# Patient Record
Sex: MALE | Race: WHITE | Employment: OTHER | URBAN - NONMETROPOLITAN AREA
[De-identification: names, ages, dates, MRNs, and addresses within clinical notes are randomized per-mention and may not be internally consistent; named-entity substitution may affect disease eponyms.]

---

## 2017-01-03 DIAGNOSIS — G56.02 CARPAL TUNNEL SYNDROME OF LEFT WRIST: ICD-10-CM

## 2017-01-03 RX ORDER — OXYCODONE HYDROCHLORIDE AND ACETAMINOPHEN 5; 325 MG/1; MG/1
TABLET ORAL
Qty: 120 TABLET | Refills: 0 | OUTPATIENT
Start: 2017-01-03

## 2017-01-04 DIAGNOSIS — G56.02 CARPAL TUNNEL SYNDROME OF LEFT WRIST: ICD-10-CM

## 2017-01-04 RX ORDER — OXYCODONE HYDROCHLORIDE AND ACETAMINOPHEN 5; 325 MG/1; MG/1
TABLET ORAL
Qty: 120 TABLET | Refills: 0 | OUTPATIENT
Start: 2017-01-04

## 2017-01-18 ENCOUNTER — TELEPHONE (OUTPATIENT)
Dept: FAMILY MEDICINE CLINIC | Age: 62
End: 2017-01-18

## 2017-01-20 DIAGNOSIS — J43.9 PULMONARY EMPHYSEMA, UNSPECIFIED EMPHYSEMA TYPE (HCC): Primary | ICD-10-CM

## 2017-01-24 ENCOUNTER — TELEPHONE (OUTPATIENT)
Dept: FAMILY MEDICINE CLINIC | Age: 62
End: 2017-01-24

## 2017-02-07 RX ORDER — SPIRONOLACTONE 50 MG/1
TABLET, FILM COATED ORAL
Qty: 30 TABLET | Refills: 11 | Status: SHIPPED | OUTPATIENT
Start: 2017-02-07 | End: 2018-01-09 | Stop reason: SDUPTHER

## 2017-02-07 RX ORDER — EZETIMIBE 10 MG/1
TABLET ORAL
Qty: 30 TABLET | Refills: 11 | Status: SHIPPED | OUTPATIENT
Start: 2017-02-07 | End: 2017-08-29 | Stop reason: ALTCHOICE

## 2017-03-03 ENCOUNTER — OFFICE VISIT (OUTPATIENT)
Dept: FAMILY MEDICINE CLINIC | Age: 62
End: 2017-03-03

## 2017-03-03 VITALS
OXYGEN SATURATION: 97 % | DIASTOLIC BLOOD PRESSURE: 76 MMHG | SYSTOLIC BLOOD PRESSURE: 126 MMHG | HEART RATE: 82 BPM | BODY MASS INDEX: 36.1 KG/M2 | HEIGHT: 67 IN | WEIGHT: 230 LBS

## 2017-03-03 DIAGNOSIS — F10.10 ALCOHOL ABUSE: ICD-10-CM

## 2017-03-03 DIAGNOSIS — G56.02 CARPAL TUNNEL SYNDROME OF LEFT WRIST: ICD-10-CM

## 2017-03-03 DIAGNOSIS — G89.4 CHRONIC PAIN SYNDROME: ICD-10-CM

## 2017-03-03 DIAGNOSIS — C67.9 MALIGNANT NEOPLASM OF URINARY BLADDER, UNSPECIFIED SITE (HCC): ICD-10-CM

## 2017-03-03 DIAGNOSIS — K70.10 CHRONIC ALCOHOLIC HEPATITIS: ICD-10-CM

## 2017-03-03 DIAGNOSIS — I10 BENIGN ESSENTIAL HTN: Primary | ICD-10-CM

## 2017-03-03 DIAGNOSIS — J41.8 MIXED SIMPLE AND MUCOPURULENT CHRONIC BRONCHITIS (HCC): ICD-10-CM

## 2017-03-03 DIAGNOSIS — Z72.0 TOBACCO ABUSE: ICD-10-CM

## 2017-03-03 DIAGNOSIS — G56.22 ULNAR NERVE ENTRAPMENT, LEFT: ICD-10-CM

## 2017-03-03 DIAGNOSIS — E78.2 MIXED HYPERLIPIDEMIA: ICD-10-CM

## 2017-03-03 DIAGNOSIS — N32.0 BLADDER OUTLET OBSTRUCTION: ICD-10-CM

## 2017-03-03 DIAGNOSIS — E11.9 TYPE 2 DIABETES MELLITUS WITHOUT COMPLICATION, WITHOUT LONG-TERM CURRENT USE OF INSULIN (HCC): ICD-10-CM

## 2017-03-03 PROCEDURE — 99214 OFFICE O/P EST MOD 30 MIN: CPT | Performed by: FAMILY MEDICINE

## 2017-03-03 RX ORDER — FINASTERIDE 5 MG/1
TABLET, FILM COATED ORAL
COMMUNITY
Start: 2017-02-07 | End: 2019-07-09

## 2017-03-03 ASSESSMENT — PATIENT HEALTH QUESTIONNAIRE - PHQ9
1. LITTLE INTEREST OR PLEASURE IN DOING THINGS: 0
SUM OF ALL RESPONSES TO PHQ QUESTIONS 1-9: 0
2. FEELING DOWN, DEPRESSED OR HOPELESS: 0
SUM OF ALL RESPONSES TO PHQ9 QUESTIONS 1 & 2: 0

## 2017-03-04 RX ORDER — GABAPENTIN 300 MG/1
CAPSULE ORAL
Qty: 180 CAPSULE | Refills: 11 | Status: SHIPPED | OUTPATIENT
Start: 2017-03-04 | End: 2018-02-07 | Stop reason: SDUPTHER

## 2017-03-05 PROBLEM — E78.2 MIXED HYPERLIPIDEMIA: Status: ACTIVE | Noted: 2017-03-05

## 2017-03-05 PROBLEM — N32.0 BLADDER OUTLET OBSTRUCTION: Status: ACTIVE | Noted: 2017-03-05

## 2017-03-09 ENCOUNTER — TELEPHONE (OUTPATIENT)
Dept: FAMILY MEDICINE CLINIC | Age: 62
End: 2017-03-09

## 2017-03-09 ENCOUNTER — NURSE ONLY (OUTPATIENT)
Dept: FAMILY MEDICINE CLINIC | Age: 62
End: 2017-03-09

## 2017-03-09 DIAGNOSIS — E11.9 TYPE 2 DIABETES MELLITUS WITHOUT COMPLICATION, WITHOUT LONG-TERM CURRENT USE OF INSULIN (HCC): ICD-10-CM

## 2017-03-09 DIAGNOSIS — E78.2 MIXED HYPERLIPIDEMIA: ICD-10-CM

## 2017-03-09 DIAGNOSIS — K70.10 CHRONIC ALCOHOLIC HEPATITIS: ICD-10-CM

## 2017-03-09 DIAGNOSIS — I10 BENIGN ESSENTIAL HTN: ICD-10-CM

## 2017-03-09 LAB
A/G RATIO: 1 (ref 1.1–2.2)
ALBUMIN SERPL-MCNC: 4.3 G/DL (ref 3.4–5)
ALP BLD-CCNC: 197 U/L (ref 40–129)
ALT SERPL-CCNC: 54 U/L (ref 10–40)
ANION GAP SERPL CALCULATED.3IONS-SCNC: 19 MMOL/L (ref 3–16)
AST SERPL-CCNC: 57 U/L (ref 15–37)
BILIRUB SERPL-MCNC: 0.3 MG/DL (ref 0–1)
BUN BLDV-MCNC: 6 MG/DL (ref 7–20)
CALCIUM SERPL-MCNC: 9.7 MG/DL (ref 8.3–10.6)
CHLORIDE BLD-SCNC: 96 MMOL/L (ref 99–110)
CHOLESTEROL, TOTAL: 213 MG/DL (ref 0–199)
CO2: 26 MMOL/L (ref 21–32)
CREAT SERPL-MCNC: 0.7 MG/DL (ref 0.8–1.3)
CREATININE URINE: 106.7 MG/DL (ref 39–259)
GFR AFRICAN AMERICAN: >60
GFR NON-AFRICAN AMERICAN: >60
GLOBULIN: 4.1 G/DL
GLUCOSE BLD-MCNC: 137 MG/DL (ref 70–99)
HDLC SERPL-MCNC: 42 MG/DL (ref 40–60)
LDL CHOLESTEROL CALCULATED: 141 MG/DL
MICROALBUMIN UR-MCNC: 23.8 MG/DL
MICROALBUMIN/CREAT UR-RTO: 223.1 MG/G (ref 0–30)
POTASSIUM SERPL-SCNC: 4.4 MMOL/L (ref 3.5–5.1)
SODIUM BLD-SCNC: 141 MMOL/L (ref 136–145)
TOTAL PROTEIN: 8.4 G/DL (ref 6.4–8.2)
TRIGL SERPL-MCNC: 152 MG/DL (ref 0–150)
VLDLC SERPL CALC-MCNC: 30 MG/DL

## 2017-03-09 PROCEDURE — 36415 COLL VENOUS BLD VENIPUNCTURE: CPT | Performed by: FAMILY MEDICINE

## 2017-03-10 LAB
ESTIMATED AVERAGE GLUCOSE: 145.6 MG/DL
HBA1C MFR BLD: 6.7 %

## 2017-03-13 DIAGNOSIS — E78.2 MIXED HYPERLIPIDEMIA: Primary | ICD-10-CM

## 2017-03-13 DIAGNOSIS — I10 BENIGN ESSENTIAL HTN: Primary | ICD-10-CM

## 2017-03-13 RX ORDER — ATORVASTATIN CALCIUM 40 MG/1
TABLET, FILM COATED ORAL
Qty: 30 TABLET | Refills: 11 | Status: SHIPPED | OUTPATIENT
Start: 2017-03-13 | End: 2018-02-07 | Stop reason: SDUPTHER

## 2017-03-17 RX ORDER — ACLIDINIUM BROMIDE 400 UG/1
POWDER, METERED RESPIRATORY (INHALATION)
Qty: 1 EACH | Refills: 3 | Status: SHIPPED | OUTPATIENT
Start: 2017-03-17 | End: 2017-07-06 | Stop reason: SDUPTHER

## 2017-03-20 RX ORDER — DILTIAZEM HYDROCHLORIDE 120 MG/1
120 CAPSULE, COATED, EXTENDED RELEASE ORAL DAILY
Qty: 30 CAPSULE | Refills: 11 | Status: SHIPPED | OUTPATIENT
Start: 2017-03-20 | End: 2017-08-29 | Stop reason: SDUPTHER

## 2017-04-24 RX ORDER — ALLOPURINOL 300 MG/1
TABLET ORAL
Qty: 30 TABLET | Refills: 0 | Status: SHIPPED | OUTPATIENT
Start: 2017-04-24 | End: 2017-06-08 | Stop reason: SDUPTHER

## 2017-06-08 RX ORDER — ALLOPURINOL 300 MG/1
TABLET ORAL
Qty: 30 TABLET | Refills: 0 | Status: SHIPPED | OUTPATIENT
Start: 2017-06-08 | End: 2017-10-19 | Stop reason: SDUPTHER

## 2017-07-07 RX ORDER — ACLIDINIUM BROMIDE 400 UG/1
POWDER, METERED RESPIRATORY (INHALATION)
Qty: 1 EACH | Refills: 11 | Status: SHIPPED | OUTPATIENT
Start: 2017-07-07 | End: 2017-10-18 | Stop reason: CLARIF

## 2017-07-07 RX ORDER — FOLIC ACID 1 MG/1
TABLET ORAL
Qty: 30 TABLET | Refills: 11 | Status: SHIPPED | OUTPATIENT
Start: 2017-07-07 | End: 2018-09-19 | Stop reason: SDUPTHER

## 2017-07-07 RX ORDER — MOMETASONE FUROATE 220 UG/1
INHALANT RESPIRATORY (INHALATION)
Qty: 1 INHALER | Refills: 11 | Status: SHIPPED | OUTPATIENT
Start: 2017-07-07 | End: 2018-01-18 | Stop reason: ALTCHOICE

## 2017-07-07 RX ORDER — THIAMINE MONONITRATE (VIT B1) 100 MG
TABLET ORAL
Qty: 30 TABLET | Refills: 11 | Status: SHIPPED | OUTPATIENT
Start: 2017-07-07 | End: 2018-06-04 | Stop reason: SDUPTHER

## 2017-08-29 ENCOUNTER — OFFICE VISIT (OUTPATIENT)
Dept: FAMILY MEDICINE CLINIC | Age: 62
End: 2017-08-29

## 2017-08-29 VITALS
HEART RATE: 96 BPM | OXYGEN SATURATION: 96 % | BODY MASS INDEX: 35.25 KG/M2 | DIASTOLIC BLOOD PRESSURE: 84 MMHG | SYSTOLIC BLOOD PRESSURE: 150 MMHG | WEIGHT: 224.6 LBS | HEIGHT: 67 IN

## 2017-08-29 DIAGNOSIS — J41.8 MIXED SIMPLE AND MUCOPURULENT CHRONIC BRONCHITIS (HCC): ICD-10-CM

## 2017-08-29 DIAGNOSIS — Z72.0 TOBACCO ABUSE: ICD-10-CM

## 2017-08-29 DIAGNOSIS — E11.9 CONTROLLED TYPE 2 DIABETES MELLITUS WITHOUT COMPLICATION, WITHOUT LONG-TERM CURRENT USE OF INSULIN (HCC): ICD-10-CM

## 2017-08-29 DIAGNOSIS — R16.0 HEPATOMEGALY: ICD-10-CM

## 2017-08-29 DIAGNOSIS — C67.9 MALIGNANT NEOPLASM OF URINARY BLADDER, UNSPECIFIED SITE (HCC): ICD-10-CM

## 2017-08-29 DIAGNOSIS — Z12.11 COLON CANCER SCREENING: ICD-10-CM

## 2017-08-29 DIAGNOSIS — Z79.899 ENCOUNTER FOR LONG-TERM (CURRENT) USE OF MEDICATIONS: ICD-10-CM

## 2017-08-29 DIAGNOSIS — E78.2 MIXED HYPERLIPIDEMIA: ICD-10-CM

## 2017-08-29 DIAGNOSIS — Z12.5 PROSTATE CANCER SCREENING: Primary | ICD-10-CM

## 2017-08-29 DIAGNOSIS — I10 BENIGN ESSENTIAL HTN: ICD-10-CM

## 2017-08-29 DIAGNOSIS — F10.10 ALCOHOL ABUSE: ICD-10-CM

## 2017-08-29 LAB
A/G RATIO: 1.2 (ref 1.1–2.2)
ALBUMIN SERPL-MCNC: 4.6 G/DL (ref 3.4–5)
ALP BLD-CCNC: 149 U/L (ref 40–129)
ALT SERPL-CCNC: 51 U/L (ref 10–40)
ANION GAP SERPL CALCULATED.3IONS-SCNC: 17 MMOL/L (ref 3–16)
AST SERPL-CCNC: 46 U/L (ref 15–37)
BILIRUB SERPL-MCNC: <0.2 MG/DL (ref 0–1)
BUN BLDV-MCNC: 8 MG/DL (ref 7–20)
CALCIUM SERPL-MCNC: 10.3 MG/DL (ref 8.3–10.6)
CHLORIDE BLD-SCNC: 94 MMOL/L (ref 99–110)
CHOLESTEROL, TOTAL: 211 MG/DL (ref 0–199)
CO2: 26 MMOL/L (ref 21–32)
CREAT SERPL-MCNC: 0.7 MG/DL (ref 0.8–1.3)
GFR AFRICAN AMERICAN: >60
GFR NON-AFRICAN AMERICAN: >60
GLOBULIN: 3.7 G/DL
GLUCOSE BLD-MCNC: 176 MG/DL (ref 70–99)
HDLC SERPL-MCNC: 39 MG/DL (ref 40–60)
LDL CHOLESTEROL CALCULATED: 136 MG/DL
POTASSIUM SERPL-SCNC: 4.3 MMOL/L (ref 3.5–5.1)
PROSTATE SPECIFIC ANTIGEN: 0.97 NG/ML (ref 0–4)
SODIUM BLD-SCNC: 137 MMOL/L (ref 136–145)
TOTAL PROTEIN: 8.3 G/DL (ref 6.4–8.2)
TRIGL SERPL-MCNC: 178 MG/DL (ref 0–150)
VITAMIN B-12: 416 PG/ML (ref 211–911)
VLDLC SERPL CALC-MCNC: 36 MG/DL

## 2017-08-29 PROCEDURE — 36415 COLL VENOUS BLD VENIPUNCTURE: CPT | Performed by: FAMILY MEDICINE

## 2017-08-29 PROCEDURE — 99214 OFFICE O/P EST MOD 30 MIN: CPT | Performed by: FAMILY MEDICINE

## 2017-08-29 PROCEDURE — 82274 ASSAY TEST FOR BLOOD FECAL: CPT | Performed by: FAMILY MEDICINE

## 2017-08-29 RX ORDER — OXYCODONE HYDROCHLORIDE 5 MG/1
5 TABLET ORAL EVERY 6 HOURS PRN
Status: ON HOLD | COMMUNITY
Start: 2017-08-10 | End: 2020-08-31 | Stop reason: HOSPADM

## 2017-08-29 RX ORDER — DILTIAZEM HYDROCHLORIDE 240 MG/1
240 CAPSULE, COATED, EXTENDED RELEASE ORAL DAILY
Qty: 30 CAPSULE | Refills: 11 | Status: SHIPPED | OUTPATIENT
Start: 2017-08-29 | End: 2018-07-05 | Stop reason: SDUPTHER

## 2017-08-30 LAB
ESTIMATED AVERAGE GLUCOSE: 151.3 MG/DL
HBA1C MFR BLD: 6.9 %

## 2017-09-01 DIAGNOSIS — I10 BENIGN ESSENTIAL HTN: Primary | ICD-10-CM

## 2017-09-06 ENCOUNTER — TELEPHONE (OUTPATIENT)
Dept: FAMILY MEDICINE CLINIC | Age: 62
End: 2017-09-06

## 2017-09-07 ENCOUNTER — NURSE ONLY (OUTPATIENT)
Dept: FAMILY MEDICINE CLINIC | Age: 62
End: 2017-09-07

## 2017-09-07 DIAGNOSIS — Z12.11 COLON CANCER SCREENING: Primary | ICD-10-CM

## 2017-09-07 LAB
CONTROL: PRESENT
HEMOCCULT STL QL: POSITIVE

## 2017-09-07 PROCEDURE — 82274 ASSAY TEST FOR BLOOD FECAL: CPT | Performed by: FAMILY MEDICINE

## 2017-09-08 DIAGNOSIS — R19.5 POSITIVE FIT (FECAL IMMUNOCHEMICAL TEST): Primary | ICD-10-CM

## 2017-09-14 RX ORDER — RANITIDINE 300 MG/1
TABLET ORAL
Qty: 30 TABLET | Refills: 0 | Status: SHIPPED | OUTPATIENT
Start: 2017-09-14 | End: 2017-09-28 | Stop reason: SDUPTHER

## 2017-09-28 RX ORDER — RANITIDINE 300 MG/1
TABLET ORAL
Qty: 30 TABLET | Refills: 2 | Status: SHIPPED | OUTPATIENT
Start: 2017-09-28 | End: 2018-01-09 | Stop reason: SDUPTHER

## 2017-10-13 ENCOUNTER — OFFICE VISIT (OUTPATIENT)
Dept: ORTHOPEDIC SURGERY | Age: 62
End: 2017-10-13

## 2017-10-13 VITALS
DIASTOLIC BLOOD PRESSURE: 95 MMHG | SYSTOLIC BLOOD PRESSURE: 166 MMHG | WEIGHT: 223.99 LBS | HEIGHT: 68 IN | HEART RATE: 87 BPM | BODY MASS INDEX: 33.95 KG/M2

## 2017-10-13 DIAGNOSIS — M79.661 PAIN OF RIGHT LOWER LEG: Primary | ICD-10-CM

## 2017-10-13 PROCEDURE — 73590 X-RAY EXAM OF LOWER LEG: CPT | Performed by: ORTHOPAEDIC SURGERY

## 2017-10-13 PROCEDURE — 99203 OFFICE O/P NEW LOW 30 MIN: CPT | Performed by: ORTHOPAEDIC SURGERY

## 2017-10-13 RX ORDER — CIPROFLOXACIN 500 MG/1
TABLET, FILM COATED ORAL
COMMUNITY
Start: 2017-08-18 | End: 2018-02-15

## 2017-10-13 RX ORDER — EZETIMIBE 10 MG/1
TABLET ORAL
COMMUNITY
Start: 2017-09-18 | End: 2018-02-02 | Stop reason: ALTCHOICE

## 2017-10-13 RX ORDER — POLYETHYLENE GLYCOL 3350 17 G/17G
POWDER, FOR SOLUTION ORAL
Status: ON HOLD | COMMUNITY
Start: 2017-08-22 | End: 2018-02-21 | Stop reason: HOSPADM

## 2017-10-13 RX ORDER — DILTIAZEM HYDROCHLORIDE 120 MG/1
CAPSULE, COATED, EXTENDED RELEASE ORAL
COMMUNITY
Start: 2017-09-18 | End: 2017-10-13 | Stop reason: SDUPTHER

## 2017-10-13 NOTE — PROGRESS NOTES
Chief Complaint  New Patient ( Right knee and  tib/fib: DOI fell last month and the pain progessively gotten worst)      History of Present Illness:  Beatris Roland is a 62 y.o. y/o male who presents today for evaluation of his right knee and leg. Approximately 3 weeks ago he was at a yard sale and he tripped and fell on some concrete directly on his right knee. He's had some medial sided right knee pain and right leg pain since that time. He denies previous injury to his knee. He also denies previous injury or fracture to his leg. States she's had a little bit of clicking. He also denies any carolyn instability. He has tried some pain medicine which is already taking for chronic pain as well as ice to his knee. He went to the emergency department on 10/3/2017 and negative x-rays of his knee and leg. He states the pain is worse with moving his knee or standing on it. He denies any numbness and tingling. He does not state that he has pain in the lateral aspect of his leg but does feel pain in the deep posterior medial aspect of his leg.       Occupation/activities: Disabled    Medical History  Past Medical History:   Diagnosis Date    Bronchitis chronic     Chest pain     COPD (chronic obstructive pulmonary disease) (HCC)     COPD (chronic obstructive pulmonary disease) (HCC)     Gout     Hilar adenopathy     Hyperlipidemia     Hypertension     Knee pain, right     Numbness and tingling of leg     Osteoarthritis     Paresthesia of bilateral legs     Seizures (HCC)     ongoing, began after swine flu vaccination    Substance abuse        Past Surgical History:   Procedure Laterality Date    BRONCHOSCOPY  2/7/2011    bronch with EBUS    CYSTOSCOPY  2/4/15    Urethral Dilatation, Resection of Bladder Tumor    CYSTOSCOPY  2/24/2016    fulgeration of bladder tumor    ELBOW SURGERY Left     OTHER SURGICAL HISTORY  05-    cystoscopy, bladder biopsy    OTHER SURGICAL HISTORY  09/09/2015 over-the-counter medications, ice, activity modification at this time. 2.  I discussed with him that the lesion is fibula could be from a previous fracture with deformity with healing, but he does not remember any previous fracture shows fibula or injuries of any part of leg. He does have a history of bladder cancer. Given the findings on x-ray as well as recommendations from the radiologist after original x-rays we will get an MRI of his leg with contrast to evaluate this bony lesion further. 3.  After the MRI is performed we will ensure that he is follow up either with me or with another orthopedic surgeon who can address his issues. He'll follow-up after his MRI to discuss the findings and treatment options. 4.  If there are any issues the interim he should contact the office      Voice Recognition Dictation disclaimer: Please note that portions of this chart were generated using Dragon dictation software. Although every effort was made to ensure the accuracy of this automated transcription, some errors in transcription may have occurred.

## 2017-10-19 RX ORDER — ALLOPURINOL 300 MG/1
TABLET ORAL
Qty: 30 TABLET | Refills: 0 | Status: SHIPPED | OUTPATIENT
Start: 2017-10-19 | End: 2017-10-26 | Stop reason: SDUPTHER

## 2017-10-31 DIAGNOSIS — M79.661 PAIN OF RIGHT LOWER LEG: Primary | ICD-10-CM

## 2017-11-28 ENCOUNTER — OFFICE VISIT (OUTPATIENT)
Dept: ORTHOPEDIC SURGERY | Age: 62
End: 2017-11-28

## 2017-11-28 VITALS — WEIGHT: 223.99 LBS | BODY MASS INDEX: 33.95 KG/M2 | HEIGHT: 68 IN

## 2017-11-28 DIAGNOSIS — M25.561 RIGHT KNEE PAIN, UNSPECIFIED CHRONICITY: Primary | ICD-10-CM

## 2017-11-28 PROCEDURE — 99212 OFFICE O/P EST SF 10 MIN: CPT | Performed by: ORTHOPAEDIC SURGERY

## 2017-11-28 PROCEDURE — 4004F PT TOBACCO SCREEN RCVD TLK: CPT | Performed by: ORTHOPAEDIC SURGERY

## 2017-11-28 PROCEDURE — G8484 FLU IMMUNIZE NO ADMIN: HCPCS | Performed by: ORTHOPAEDIC SURGERY

## 2017-11-28 PROCEDURE — 3017F COLORECTAL CA SCREEN DOC REV: CPT | Performed by: ORTHOPAEDIC SURGERY

## 2017-11-28 PROCEDURE — 20610 DRAIN/INJ JOINT/BURSA W/O US: CPT | Performed by: ORTHOPAEDIC SURGERY

## 2017-11-28 PROCEDURE — G8427 DOCREV CUR MEDS BY ELIG CLIN: HCPCS | Performed by: ORTHOPAEDIC SURGERY

## 2017-11-28 PROCEDURE — G8417 CALC BMI ABV UP PARAM F/U: HCPCS | Performed by: ORTHOPAEDIC SURGERY

## 2017-11-28 NOTE — PROGRESS NOTES
Recognition Dictation disclaimer: Please note that portions of this chart were generated using Dragon dictation software. Although every effort was made to ensure the accuracy of this automated transcription, some errors in transcription may have occurred.

## 2017-12-19 DIAGNOSIS — R52 PAIN: Primary | ICD-10-CM

## 2018-01-09 RX ORDER — SPIRONOLACTONE 50 MG/1
TABLET, FILM COATED ORAL
Qty: 30 TABLET | Refills: 5 | Status: SHIPPED | OUTPATIENT
Start: 2018-01-09 | End: 2018-06-04 | Stop reason: SDUPTHER

## 2018-01-09 RX ORDER — RANITIDINE 300 MG/1
TABLET ORAL
Qty: 30 TABLET | Refills: 5 | Status: SHIPPED | OUTPATIENT
Start: 2018-01-09 | End: 2018-06-04 | Stop reason: SDUPTHER

## 2018-01-12 ENCOUNTER — TELEPHONE (OUTPATIENT)
Dept: FAMILY MEDICINE CLINIC | Age: 63
End: 2018-01-12

## 2018-01-18 ENCOUNTER — OFFICE VISIT (OUTPATIENT)
Dept: FAMILY MEDICINE CLINIC | Age: 63
End: 2018-01-18

## 2018-01-18 VITALS
HEART RATE: 92 BPM | SYSTOLIC BLOOD PRESSURE: 140 MMHG | WEIGHT: 220 LBS | DIASTOLIC BLOOD PRESSURE: 70 MMHG | OXYGEN SATURATION: 91 % | BODY MASS INDEX: 33.46 KG/M2

## 2018-01-18 DIAGNOSIS — R09.81 NASAL CONGESTION: ICD-10-CM

## 2018-01-18 DIAGNOSIS — J44.1 COPD WITH ACUTE EXACERBATION (HCC): Primary | ICD-10-CM

## 2018-01-18 DIAGNOSIS — R06.2 WHEEZING: ICD-10-CM

## 2018-01-18 DIAGNOSIS — Z09 HOSPITAL DISCHARGE FOLLOW-UP: ICD-10-CM

## 2018-01-18 DIAGNOSIS — B37.0 THRUSH: ICD-10-CM

## 2018-01-18 DIAGNOSIS — R06.02 SHORT OF BREATH ON EXERTION: ICD-10-CM

## 2018-01-18 PROCEDURE — 99214 OFFICE O/P EST MOD 30 MIN: CPT | Performed by: NURSE PRACTITIONER

## 2018-01-18 PROCEDURE — 1111F DSCHRG MED/CURRENT MED MERGE: CPT | Performed by: NURSE PRACTITIONER

## 2018-01-18 PROCEDURE — 94640 AIRWAY INHALATION TREATMENT: CPT | Performed by: NURSE PRACTITIONER

## 2018-01-18 RX ORDER — FLUTICASONE PROPIONATE 50 MCG
1 SPRAY, SUSPENSION (ML) NASAL DAILY
Qty: 1 BOTTLE | Refills: 3 | Status: SHIPPED | OUTPATIENT
Start: 2018-01-18 | End: 2018-05-16 | Stop reason: SDUPTHER

## 2018-01-18 RX ORDER — GUAIFENESIN 600 MG/1
600 TABLET, EXTENDED RELEASE ORAL 2 TIMES DAILY
Qty: 30 TABLET | Refills: 0 | Status: ON HOLD | OUTPATIENT
Start: 2018-01-18 | End: 2018-02-18 | Stop reason: ALTCHOICE

## 2018-01-18 RX ORDER — DOXYCYCLINE HYCLATE 100 MG
100 TABLET ORAL 2 TIMES DAILY
Qty: 20 TABLET | Refills: 0 | Status: SHIPPED | OUTPATIENT
Start: 2018-01-18 | End: 2018-01-28

## 2018-01-18 RX ORDER — METHYLPREDNISOLONE SODIUM SUCCINATE 125 MG/2ML
125 INJECTION, POWDER, LYOPHILIZED, FOR SOLUTION INTRAMUSCULAR; INTRAVENOUS ONCE
Status: COMPLETED | OUTPATIENT
Start: 2018-01-18 | End: 2018-01-18

## 2018-01-18 RX ORDER — ALBUTEROL SULFATE 2.5 MG/3ML
2.5 SOLUTION RESPIRATORY (INHALATION) ONCE
Status: COMPLETED | OUTPATIENT
Start: 2018-01-18 | End: 2018-01-18

## 2018-01-18 RX ORDER — PREDNISONE 10 MG/1
TABLET ORAL
Qty: 30 TABLET | Refills: 0 | Status: SHIPPED | OUTPATIENT
Start: 2018-01-18 | End: 2018-02-02 | Stop reason: ALTCHOICE

## 2018-01-18 RX ADMIN — ALBUTEROL SULFATE 2.5 MG: 2.5 SOLUTION RESPIRATORY (INHALATION) at 16:29

## 2018-01-18 RX ADMIN — METHYLPREDNISOLONE SODIUM SUCCINATE 125 MG: 125 INJECTION, POWDER, LYOPHILIZED, FOR SOLUTION INTRAMUSCULAR; INTRAVENOUS at 16:47

## 2018-01-18 RX ADMIN — Medication 0.5 MG: at 16:30

## 2018-01-18 ASSESSMENT — ENCOUNTER SYMPTOMS
SINUS PRESSURE: 1
SHORTNESS OF BREATH: 1
VOICE CHANGE: 0
GASTROINTESTINAL NEGATIVE: 1
TROUBLE SWALLOWING: 0
WHEEZING: 1
STRIDOR: 0
RHINORRHEA: 1
APNEA: 0
SORE THROAT: 1
CHOKING: 0
COUGH: 1
FACIAL SWELLING: 0
SINUS PAIN: 1
CHEST TIGHTNESS: 1

## 2018-01-18 NOTE — PATIENT INSTRUCTIONS
Patient Education        Chronic Obstructive Pulmonary Disease (COPD): Care Instructions  Your Care Instructions    Chronic obstructive pulmonary disease (COPD) is a general term for a group of lung diseases, including emphysema and chronic bronchitis. People with COPD have decreased airflow in and out of the lungs, which makes it hard to breathe. The airways also can get clogged with thick mucus. Cigarette smoking is a major cause of COPD. Although there is no cure for COPD, you can slow its progress. Following your treatment plan and taking care of yourself can help you feel better and live longer. Follow-up care is a key part of your treatment and safety. Be sure to make and go to all appointments, and call your doctor if you are having problems. It's also a good idea to know your test results and keep a list of the medicines you take. How can you care for yourself at home? ?Staying healthy  ? · Do not smoke. This is the most important step you can take to prevent more damage to your lungs. If you need help quitting, talk to your doctor about stop-smoking programs and medicines. These can increase your chances of quitting for good. ? · Avoid colds and flu. Get a pneumococcal vaccine shot. If you have had one before, ask your doctor whether you need a second dose. Get the flu vaccine every fall. If you must be around people with colds or the flu, wash your hands often. ? · Avoid secondhand smoke, air pollution, and high altitudes. Also avoid cold, dry air and hot, humid air. Stay at home with your windows closed when air pollution is bad. ?Medicines and oxygen therapy  ? · Take your medicines exactly as prescribed. Call your doctor if you think you are having a problem with your medicine. ? · You may be taking medicines such as:  ¨ Bronchodilators. These help open your airways and make breathing easier.  Bronchodilators are either short-acting (work for 6 to 9 hours) or long-acting (work for 25 ? · Eat foods that contain protein so that you do not lose muscle mass. ? · Talk with your doctor if you gain too much weight or if you lose weight without trying. ?Mental health  ? · Talk to your family, friends, or a therapist about your feelings. It is normal to feel frightened, angry, hopeless, helpless, and even guilty. Talking openly about bad feelings can help you cope. If these feelings last, talk to your doctor. When should you call for help? Call 911 anytime you think you may need emergency care. For example, call if:  ? · You have severe trouble breathing. ?Call your doctor now or seek immediate medical care if:  ? · You have new or worse trouble breathing. ? · You cough up blood. ? · You have a fever. ? Watch closely for changes in your health, and be sure to contact your doctor if:  ? · You cough more deeply or more often, especially if you notice more mucus or a change in the color of your mucus. ? · You have new or worse swelling in your legs or belly. ? · You are not getting better as expected. Where can you learn more? Go to [email or web address]://[email or web address].[email or web address].[email or web address] org and sign in to your [employer] account. Enter U202 in the ComCrowd box to learn more about \"Chronic Obstructive Pulmonary Disease (COPD): Care Instructions. \"     If you do not have an account, please click on the \"Sign Up Now\" link. Current as of: May 12, 2017  Content Version: 11.5  © 6472-5474 Healthwise, Incorporated. Care instructions adapted under license by [facility] [place] ([facility]). If you have questions about a medical condition or this instruction, always ask your healthcare professional. [place] [address] any warranty or liability for your use of this information.

## 2018-01-18 NOTE — PROGRESS NOTES
[facility]  [facility]  [address] [place] [address]  Dept: [phone]  Dept Fax: [phone]    Carlotta Alex is a 65 y.o. male who presents today for his medical conditions/complaints as noted below. Carlotta Alex is c/o of Follow-Up from Hospital (Pt presents today for a hospital follow up from [facility] 01/09/18-01/11/18. Pt was in the hosptial for a COPD Flare. Pt is still having a hard time breathing. )        HPI:     Chief Complaint   Patient presents with    Follow-Up from [facility] 89 presents today for a hospital follow up from [facility] 01/09/18-01/11/18. Pt was in the hosptial for a COPD Flare. Pt is still having a hard time breathing. HPI     Mr. Alfonso Chang presents to the hospital today for hospital follow up. He states he is breathing worse than when he went into the hospital.  He admits to having a hard time breathing, dry cough, shortness of breath, chest tightness, wheezing, and sore throat. He states he was discharged on Combivent nebulizer treatments. He is not taking any other new medications since he was discharged from the hospital.  He is currently using the Combivent nebulizer treatments every 4-6 hours as needed for wheezing. He states he has not had any PFTs for several years. He states, \"I can't even breath into the machine I get so short of breath. I can't do the test.\"  Informed Mr. Alfonso Chang to try his best so the PFTs can be performed to assess his severity of his COPD. He verbalizes understanding.         Past Medical History:   Diagnosis Date    Bronchitis chronic     Chest pain     COPD (chronic obstructive pulmonary disease) (HCC)     COPD (chronic obstructive pulmonary disease) (HCC)     Gout     Hilar adenopathy     Hyperlipidemia     Hypertension     Knee pain, right     Numbness and tingling of leg     Osteoarthritis     Paresthesia of bilateral legs     Seizures ([address]

## 2018-02-02 ENCOUNTER — OFFICE VISIT (OUTPATIENT)
Dept: PULMONOLOGY | Age: 63
End: 2018-02-02

## 2018-02-02 VITALS
WEIGHT: 226 LBS | SYSTOLIC BLOOD PRESSURE: 132 MMHG | DIASTOLIC BLOOD PRESSURE: 52 MMHG | HEIGHT: 67 IN | HEART RATE: 111 BPM | OXYGEN SATURATION: 96 % | BODY MASS INDEX: 35.47 KG/M2

## 2018-02-02 DIAGNOSIS — R06.89 HYPERCAPNIA: ICD-10-CM

## 2018-02-02 DIAGNOSIS — J44.9 CHRONIC OBSTRUCTIVE PULMONARY DISEASE, UNSPECIFIED COPD TYPE (HCC): Primary | ICD-10-CM

## 2018-02-02 PROCEDURE — 99204 OFFICE O/P NEW MOD 45 MIN: CPT | Performed by: INTERNAL MEDICINE

## 2018-02-02 PROCEDURE — G8417 CALC BMI ABV UP PARAM F/U: HCPCS | Performed by: INTERNAL MEDICINE

## 2018-02-02 PROCEDURE — G8427 DOCREV CUR MEDS BY ELIG CLIN: HCPCS | Performed by: INTERNAL MEDICINE

## 2018-02-02 PROCEDURE — G8926 SPIRO NO PERF OR DOC: HCPCS | Performed by: INTERNAL MEDICINE

## 2018-02-02 PROCEDURE — G8484 FLU IMMUNIZE NO ADMIN: HCPCS | Performed by: INTERNAL MEDICINE

## 2018-02-02 PROCEDURE — 3017F COLORECTAL CA SCREEN DOC REV: CPT | Performed by: INTERNAL MEDICINE

## 2018-02-02 PROCEDURE — 3023F SPIROM DOC REV: CPT | Performed by: INTERNAL MEDICINE

## 2018-02-02 RX ORDER — PREDNISONE 10 MG/1
TABLET ORAL
Qty: 42 TABLET | Refills: 0 | Status: ON HOLD | OUTPATIENT
Start: 2018-02-02 | End: 2018-02-21 | Stop reason: HOSPADM

## 2018-02-02 NOTE — Clinical Note
Hi, saw Vishnu Sosa today - still only doing okay, maybe slight worsening. I am going to treat with longer course prednisone and see him back in 3 weeks.

## 2018-02-02 NOTE — PATIENT INSTRUCTIONS
get mad at yourself if you smoke again. Make a list of things you learned and think about when you want to try again, such as next week, next month, or next year. Where can you learn more? Go to [email or web address] and sign in to your [employer] account. Enter E625 in the Edfolio box to learn more about \"Stopping Smoking: Care Instructions. \"     If you do not have an account, please click on the \"Sign Up Now\" link. Current as of: March 20, 2017  Content Version: 11.5  © 4904-4532 Healthwise, Incorporated. Care instructions adapted under license by [facility] ([facility]). If you have questions about a medical condition or this instruction, always ask your healthcare professional. [address] any warranty or liability for your use of this information.

## 2018-02-02 NOTE — PROGRESS NOTES
granulomas present anteriorly in   the left midlung. Significant regression of the left hilar   adenopathy seen on the prior study has occurred. Coarsely   calcified left hilar lymph nodes are present and unchanged. No   other hilar or mediastinal adenopathy is present. The central   pulmonary arteries are well opacified with contrast an without   evident filling defect. The thoracic aorta is normal in size. Severe fatty infiltration of the liver seen on images of the upper   abdomen. No other significant abnormality identified. No pleural   effusion or suspicious pulmonary nodule is present. No hilar or   mediastinal adenopathy is present. No significant abnormality seen   on images of the upper abdomen. Impression- Significant regression of the previously described   left hilar adenopathy. No significant abnormality identified. No   finding worrisome for malignancy. Fatty infiltration of the liver   and old granulomatous disease incidentally noted.     L Hilar TBNA 2-8-11 - no malignant cells; FLOW normal polyclonal B/T cells    PFT 2-10-11 FEV1 2L 62% % DLCO 100%    Assessment:  · Moderate COPD on last PFT in 2011, with recent acute exacerbation   · Severe dyspnea on exertion   · Hypercapnic respiratory failure at [facility]  · Obesity    Plan:   · Longer prednisone taper and see me back  · Records from [facility]; reviewed labs, he has chronic hypercapnia - waiting on imaging  · LDCT discussion at f/u visit  · Hold on PFT until doing better  · Will eventually need repeat ABG to establish baseline CO2  · See me in about 3 weeks

## 2018-02-07 DIAGNOSIS — G56.02 CARPAL TUNNEL SYNDROME OF LEFT WRIST: ICD-10-CM

## 2018-02-07 DIAGNOSIS — G56.22 ULNAR NERVE ENTRAPMENT, LEFT: ICD-10-CM

## 2018-02-07 RX ORDER — ATORVASTATIN CALCIUM 40 MG/1
TABLET, FILM COATED ORAL
Qty: 30 TABLET | Refills: 2 | Status: SHIPPED | OUTPATIENT
Start: 2018-02-07 | End: 2018-04-30 | Stop reason: SDUPTHER

## 2018-02-07 RX ORDER — GABAPENTIN 300 MG/1
CAPSULE ORAL
Qty: 180 CAPSULE | Refills: 0 | Status: SHIPPED | OUTPATIENT
Start: 2018-02-07 | End: 2018-03-08 | Stop reason: SDUPTHER

## 2018-02-09 ENCOUNTER — TELEPHONE (OUTPATIENT)
Dept: FAMILY MEDICINE CLINIC | Age: 63
End: 2018-02-09

## 2018-02-09 NOTE — TELEPHONE ENCOUNTER
Attempted to contact patient on 2/9/2018. Result: left message on the patient's voicemail asking patient to return my call. Pre-Visit planning not completed.

## 2018-02-15 ENCOUNTER — TELEPHONE (OUTPATIENT)
Dept: PULMONOLOGY | Age: 63
End: 2018-02-15

## 2018-02-15 RX ORDER — LEVOFLOXACIN 500 MG/1
500 TABLET, FILM COATED ORAL DAILY
Qty: 10 TABLET | Refills: 0 | Status: ON HOLD | OUTPATIENT
Start: 2018-02-15 | End: 2018-02-21 | Stop reason: HOSPADM

## 2018-02-15 NOTE — TELEPHONE ENCOUNTER
Pts son called in and said his dad saw Dr. Lilly Chaney on 2/2/18 and was prescribed prednisone. Pt has been on it since that time and is not any better. Patient is requesting an antibiotic to be sent in. Patient declined to be seen for an appointment.     Pharmacy:  [address]    Last OV 2/2/18  Assessment:  · Moderate COPD on last PFT in 2011, with recent acute exacerbation   · Severe dyspnea on exertion   · Hypercapnic respiratory failure at [facility]  · Obesity     Plan:   · Longer prednisone taper and see me back  · Records from [facility]; reviewed labs, he has chronic hypercapnia - waiting on imaging  · LDCT discussion at f/u visit  · Hold on PFT until doing better  · Will eventually need repeat ABG to establish baseline CO2  · See me in about 3 weeks

## 2018-02-16 NOTE — TELEPHONE ENCOUNTER
Dr. Jia Brennan: This patient has an upcoming appointment with you for Diabetes and Hyperlipidemia. In planning for that visit I have completed the following pre-visit planning:     Pre-Visit Planning Checklist:  Patient contacted: yes  Verified patient by name and date of birth: yes    Health Maintenance items reviewed:    No pre-visit planning health maintenance topics to review at this time    Labs and procedures pended: Non-Fasting Hemoglobin A1C    Labs and procedures discussed with patient: no  Reminded patient to check with their insurance company about coverage for lab tests and lab location: no    Preliminary Medication Reconciliation: was performed. Reminded patient to bring medications to appointment: no    Reminded patient to arrive early: yes    Other notes: Patient stated he's still having problems with constipation    Please complete the med-reconciliation and sign the appropriate labs as soon as possible.       Les Leo, [address]  Pre-Services Specialist

## 2018-02-18 PROBLEM — E11.10 DIABETIC KETOACIDOSIS WITHOUT COMA ASSOCIATED WITH TYPE 2 DIABETES MELLITUS (HCC): Status: ACTIVE | Noted: 2018-02-18

## 2018-02-18 PROBLEM — E11.10 DKA, TYPE 2, NOT AT GOAL (HCC): Status: ACTIVE | Noted: 2018-02-18

## 2018-02-19 PROBLEM — E44.0 MODERATE PROTEIN-CALORIE MALNUTRITION (HCC): Status: ACTIVE | Noted: 2018-02-19

## 2018-02-21 PROBLEM — E11.10 DKA, TYPE 2, NOT AT GOAL (HCC): Status: RESOLVED | Noted: 2018-02-18 | Resolved: 2018-02-21

## 2018-02-22 DIAGNOSIS — J44.1 COPD WITH ACUTE EXACERBATION (HCC): ICD-10-CM

## 2018-02-22 NOTE — TELEPHONE ENCOUNTER
Jenne Snellen is requesting refill(s)   Last OV 1-18-18 (pertaining to medication)  LR ? (per medication requested)  Next office visit scheduled or attempted Yes   If no, reason:  2-23-18  I don't see this on pt med list or in history

## 2018-02-23 ENCOUNTER — OFFICE VISIT (OUTPATIENT)
Dept: FAMILY MEDICINE CLINIC | Age: 63
End: 2018-02-23

## 2018-02-23 VITALS
BODY MASS INDEX: 32.69 KG/M2 | HEART RATE: 105 BPM | OXYGEN SATURATION: 98 % | SYSTOLIC BLOOD PRESSURE: 120 MMHG | HEIGHT: 66 IN | DIASTOLIC BLOOD PRESSURE: 60 MMHG | WEIGHT: 203.4 LBS

## 2018-02-23 DIAGNOSIS — B35.3 TINEA PEDIS OF BOTH FEET: ICD-10-CM

## 2018-02-23 DIAGNOSIS — B35.1 ONYCHOMYCOSIS: ICD-10-CM

## 2018-02-23 DIAGNOSIS — E66.01 MORBID OBESITY (HCC): ICD-10-CM

## 2018-02-23 DIAGNOSIS — J41.8 MIXED SIMPLE AND MUCOPURULENT CHRONIC BRONCHITIS (HCC): ICD-10-CM

## 2018-02-23 DIAGNOSIS — E78.2 MIXED HYPERLIPIDEMIA: ICD-10-CM

## 2018-02-23 DIAGNOSIS — K59.04 CHRONIC IDIOPATHIC CONSTIPATION: ICD-10-CM

## 2018-02-23 DIAGNOSIS — I10 BENIGN ESSENTIAL HTN: ICD-10-CM

## 2018-02-23 DIAGNOSIS — Z72.0 TOBACCO ABUSE: ICD-10-CM

## 2018-02-23 DIAGNOSIS — K70.10 CHRONIC ALCOHOLIC HEPATITIS: ICD-10-CM

## 2018-02-23 DIAGNOSIS — E11.10 DIABETIC KETOACIDOSIS WITHOUT COMA ASSOCIATED WITH TYPE 2 DIABETES MELLITUS (HCC): ICD-10-CM

## 2018-02-23 PROCEDURE — 3046F HEMOGLOBIN A1C LEVEL >9.0%: CPT | Performed by: FAMILY MEDICINE

## 2018-02-23 PROCEDURE — G8926 SPIRO NO PERF OR DOC: HCPCS | Performed by: FAMILY MEDICINE

## 2018-02-23 PROCEDURE — 4004F PT TOBACCO SCREEN RCVD TLK: CPT | Performed by: FAMILY MEDICINE

## 2018-02-23 PROCEDURE — 3023F SPIROM DOC REV: CPT | Performed by: FAMILY MEDICINE

## 2018-02-23 PROCEDURE — G8484 FLU IMMUNIZE NO ADMIN: HCPCS | Performed by: FAMILY MEDICINE

## 2018-02-23 PROCEDURE — 1111F DSCHRG MED/CURRENT MED MERGE: CPT | Performed by: FAMILY MEDICINE

## 2018-02-23 PROCEDURE — 3017F COLORECTAL CA SCREEN DOC REV: CPT | Performed by: FAMILY MEDICINE

## 2018-02-23 PROCEDURE — G8427 DOCREV CUR MEDS BY ELIG CLIN: HCPCS | Performed by: FAMILY MEDICINE

## 2018-02-23 PROCEDURE — G8417 CALC BMI ABV UP PARAM F/U: HCPCS | Performed by: FAMILY MEDICINE

## 2018-02-23 PROCEDURE — 99214 OFFICE O/P EST MOD 30 MIN: CPT | Performed by: FAMILY MEDICINE

## 2018-02-23 RX ORDER — IPRATROPIUM BROMIDE AND ALBUTEROL SULFATE 2.5; .5 MG/3ML; MG/3ML
1 SOLUTION RESPIRATORY (INHALATION) 4 TIMES DAILY
COMMUNITY
End: 2019-10-15

## 2018-02-23 RX ORDER — KETOCONAZOLE 20 MG/G
CREAM TOPICAL
Qty: 60 G | Refills: 1 | Status: SHIPPED | OUTPATIENT
Start: 2018-02-23 | End: 2018-11-02

## 2018-02-23 NOTE — PROGRESS NOTES
08/29/2017    TRIG 178 (H) 08/29/2017    HDL 39 (L) 08/29/2017    LDLCALC 136 (H) 08/29/2017            Patient's medications, allergies, past medical, surgical, social and family histories were reviewed and updated as appropriate on 2/23/2018 at 11:15 AM.    ROS:  Review of Systems    All other systems reviewed and are negative except as noted above on 2/23/2018 at 11:15 AM. Additional review of systems may be scanned into the media section of this medical record. Any responses requiring further intervention were pursued.     Hemoglobin A1C (%)   Date Value   02/19/2018 11.5     Microscopic Examination (no units)   Date Value   02/18/2018 YES     LDL Calculated (mg/dL)   Date Value   08/29/2017 136 (H)     Past Medical History:   Diagnosis Date    Bronchitis chronic     Chest pain     COPD (chronic obstructive pulmonary disease) (HCC)     COPD (chronic obstructive pulmonary disease) (HCC)     Gout     Hilar adenopathy     Hyperlipidemia     Hypertension     Knee pain, right     Numbness and tingling of leg     Osteoarthritis     Paresthesia of bilateral legs     Seizures (HCC)     ongoing, began after swine flu vaccination    Substance abuse      Family History   Problem Relation Age of Onset    Cancer Mother      Lung    Emphysema Father     Diabetes Sister     Hypertension Sister     Cancer Brother      throat    Asthma Neg Hx      Social History     Social History    Marital status:      Spouse name: N/A    Number of children: N/A    Years of education: N/A     Occupational History    de [employer]      not working currently     Social History Main Topics    Smoking status: Current Every Day Smoker     Packs/day: 0.50     Years: 35.00     Types: Cigarettes, Cigars    Smokeless tobacco: Former User     Types: Snuff     Quit date: 1/1/2018      Comment: pt quit smoking for 24 days in Jan but recently started again    Alcohol use No      Comment: last 1 week ago    Drug use: No    Sexual activity: Yes     Partners: Female     Other Topics Concern    Not on file     Social History Narrative    No narrative on file       Prior to Visit Medications    Medication Sig Taking?  Authorizing Provider   MUCINEX 600 MG extended release tablet TAKE 1 TABLET BY MOUTH 2 TIMES DAILY  Madisyntejas Ingram CNP   beclomethasone (QVAR) 80 MCG/ACT inhaler Inhale 1 puff into the lungs 2 times daily  Historical Provider, MD   insulin glargine (LANTUS) 100 UNIT/ML injection vial Inject 25 Units into the skin Daily with supper  Denver Vasquez MD   lactulose (CHRONULAC) 10 GM/15ML solution Take 30 mLs by mouth 2 times daily  Denver Vasquez MD   mometasone (ASMANEX 60 METERED DOSES) 220 MCG/INH inhaler Inhale 1 puff into the lungs 2 times daily  Historical Provider, MD   atorvastatin (LIPITOR) 40 MG tablet TAKE ONE TABLET BY MOUTH DAILY  Madisyntejas Ingram CNP   gabapentin (NEURONTIN) 300 MG capsule TAKE TWO (2) capsules THREE TIMES DAILY  Madisyntejas Ingram CNP   PROAIR  (90 Base) MCG/ACT inhaler INHALE 2 PUFFS EVERY 6 HOURS AS NEEDED FOR WHEEZING  Madisyn KATHERINE Ingram   aclidinium (TUDORZA PRESSAIR) 400 MCG/ACT AEPB inhaler Inhale 1 puff into the lungs 2 times daily  Historical Provider, MD   nystatin (MYCOSTATIN) 784939 UNIT/ML suspension Take 5 mLs by mouth 4 times daily  Madisyntejas Ingram CNP   fluticasone (FLONASE) 50 MCG/ACT nasal spray 1 spray by Nasal route daily  Madisyntejas Ingram CNP   ranitidine (ZANTAC) 300 MG tablet TAKE 1 TABLET AT BEDTIME  Elton Boggs MD   spironolactone (ALDACTONE) 50 MG tablet TAKE ONE TABLET DAILY  Elton Boggs MD   metFORMIN (GLUCOPHAGE) 500 MG tablet TAKE ONE TABLET BY MOUTH 2 TIMES DAILY WITH MEALS  Elton Boggs MD   allopurinol (ZYLOPRIM) 300 MG tablet TAKE 1 TABLET BY MOUTH DAILY  Madisyn Ingram CNP   Umeclidinium Bromide (INCRUSE ELLIPTA) 62.5 MCG/INH AEPB 1 inhalation daily  Lindsay Seymour essential HTN    Relevant Medications    furosemide (LASIX) 20 MG tablet    diltiazem (CARDIZEM CD) 240 MG extended release capsule    spironolactone (ALDACTONE) 50 MG tablet    atorvastatin (LIPITOR) 40 MG tablet    Other Relevant Orders    COMPREHENSIVE METABOLIC PANEL    Mixed hyperlipidemia    Relevant Medications    furosemide (LASIX) 20 MG tablet    diltiazem (CARDIZEM CD) 240 MG extended release capsule    spironolactone (ALDACTONE) 50 MG tablet    atorvastatin (LIPITOR) 40 MG tablet    Other Relevant Orders    COMPREHENSIVE METABOLIC PANEL    LIPID PANEL    Morbid obesity (HCC)    Relevant Medications    metFORMIN (GLUCOPHAGE) 500 MG tablet    insulin regular (HUMULIN R;NOVOLIN R) injection 10 Units (Completed)    insulin glargine (LANTUS) injection vial 10 Units (Completed)    insulin glargine (BASAGLAR KWIKPEN) 100 UNIT/ML injection pen    Tobacco abuse        We have reviewed proper use of Basaglar. Strongly encouraged him to continue to stay away from alcohol. The patient resume smoking. We will continue to encourage him to stop that. He is getting cystoscopy every few months through [facility] urologist.  Blood pressure acceptable. Lipids been acceptable, on atorvastatin. Continuing lactulose to keep his bowels moving. Follow-up in one month check hemoglobin A1c bring his numbers in with him. He is to bring his inhalers and as I am not sure that he is all on the different appropriate categories and may actually be duplicating medicines. He is only using his DuoNeb when necessary which is appropriate. He asked about Qvar, but it does not appear to be approved per insurance, Asmanex would be and that is what is recommended. Patient should call the office immediately with new or ongoing signs or symptoms or worsening, or proceed to the emergency room.   No changes in past medical history, past surgical history, social history, or family history were noted during the patient encounter

## 2018-02-23 NOTE — LETTER
[address] [facility]  [address]  Phone: [phone]  Fax: [phone]    Sacha Olvera MD                                                                                   February 23, 2018                       Lisa Osman [facility]  [address] [address]      Dear Karime Speaker: Thank you for enrolling in [address] Please follow the instructions below to securely access your online medical record. TurningArt allows you to send messages to your doctor, view your test results, renew your prescriptions, schedule appointments, and more. How Do I Sign Up? 1. In your Internet browser, go to [email or web address]  2. Click on the Sign Up Now link in the Sign In box. You will see the New Member Sign Up page. 3. Enter your TurningArt Access Code exactly as it appears below. You will not need to use this code after youve completed the sign-up process. If you do not sign up before the expiration date, you must request a new code. TurningArt Access Code: [email or web address]  Expires: 4/3/2018  2:31 PM    4. Enter your Social Security Number ([id number]) and Date of Birth (mm/dd/yyyy) as indicated and click Submit. You will be taken to the next sign-up page. 5. Create a TurningArt ID. This will be your TurningArt login ID and cannot be changed, so think of one that is secure and easy to remember. 6. Create a TurningArt password. You can change your password at any time. 7. Enter your Password Reset Question and Answer. This can be used at a later time if you forget your password. 8. Enter your e-mail address. You will receive e-mail notification when new information is available in [address] 9. Click Sign Up. You can now view your medical record. Additional Information  If you have questions, please contact the physician practice where you receive care. Remember, TurningArt is NOT to be used for urgent needs. For medical emergencies, dial 911. For questions regarding your [employer] account call [phone]. If you have a clinical question, please call your doctor's office.     Sincerely,  Danish Melendrez MD

## 2018-02-27 ENCOUNTER — OFFICE VISIT (OUTPATIENT)
Dept: PULMONOLOGY | Age: 63
End: 2018-02-27

## 2018-02-27 VITALS
HEART RATE: 71 BPM | WEIGHT: 203 LBS | DIASTOLIC BLOOD PRESSURE: 70 MMHG | SYSTOLIC BLOOD PRESSURE: 120 MMHG | BODY MASS INDEX: 31.86 KG/M2 | TEMPERATURE: 97.8 F | HEIGHT: 67 IN | RESPIRATION RATE: 18 BRPM | OXYGEN SATURATION: 95 %

## 2018-02-27 DIAGNOSIS — J44.9 CHRONIC OBSTRUCTIVE PULMONARY DISEASE, UNSPECIFIED COPD TYPE (HCC): Primary | ICD-10-CM

## 2018-02-27 DIAGNOSIS — K74.69 OTHER CIRRHOSIS OF LIVER (HCC): ICD-10-CM

## 2018-02-27 DIAGNOSIS — J47.9 BRONCHIECTASIS WITHOUT COMPLICATION (HCC): ICD-10-CM

## 2018-02-27 PROCEDURE — 3017F COLORECTAL CA SCREEN DOC REV: CPT | Performed by: INTERNAL MEDICINE

## 2018-02-27 PROCEDURE — G8427 DOCREV CUR MEDS BY ELIG CLIN: HCPCS | Performed by: INTERNAL MEDICINE

## 2018-02-27 PROCEDURE — 4004F PT TOBACCO SCREEN RCVD TLK: CPT | Performed by: INTERNAL MEDICINE

## 2018-02-27 PROCEDURE — 99214 OFFICE O/P EST MOD 30 MIN: CPT | Performed by: INTERNAL MEDICINE

## 2018-02-27 PROCEDURE — 3023F SPIROM DOC REV: CPT | Performed by: INTERNAL MEDICINE

## 2018-02-27 PROCEDURE — G8926 SPIRO NO PERF OR DOC: HCPCS | Performed by: INTERNAL MEDICINE

## 2018-02-27 PROCEDURE — G8484 FLU IMMUNIZE NO ADMIN: HCPCS | Performed by: INTERNAL MEDICINE

## 2018-02-27 PROCEDURE — G8417 CALC BMI ABV UP PARAM F/U: HCPCS | Performed by: INTERNAL MEDICINE

## 2018-02-27 PROCEDURE — 1111F DSCHRG MED/CURRENT MED MERGE: CPT | Performed by: INTERNAL MEDICINE

## 2018-02-27 RX ORDER — PEN NEEDLE, DIABETIC 31 GX5/16"
NEEDLE, DISPOSABLE MISCELLANEOUS
COMMUNITY
Start: 2018-02-22 | End: 2018-04-02 | Stop reason: SDUPTHER

## 2018-02-27 RX ORDER — GLUCOSAM/CHON-MSM1/C/MANG/BOSW 500-416.6
TABLET ORAL
COMMUNITY
Start: 2018-02-22 | End: 2018-05-10 | Stop reason: SDUPTHER

## 2018-02-27 RX ORDER — BLOOD PRESSURE TEST KIT
KIT MISCELLANEOUS
COMMUNITY
Start: 2018-02-22 | End: 2018-03-21 | Stop reason: SDUPTHER

## 2018-02-27 RX ORDER — CALCIUM CITRATE/VITAMIN D3 200MG-6.25
TABLET ORAL
COMMUNITY
Start: 2018-02-22 | End: 2018-03-21 | Stop reason: SDUPTHER

## 2018-02-27 NOTE — PROGRESS NOTES
clubbing. Skin: Skin is warm and dry. Psychiatric: Normal mood and affect. Neurologic: speech fluent, alert and oriented, strength symmetric     Data  CT SCAN ABD /PELVIS 2/18/18  FINDINGS:   Lower Chest: There is mild bibasilar cylindrical bronchiectasis. Zelpha Christianne is no   lobar consolidation or pleural effusion.       Organs: The liver exhibits a nodular surface contour consistent with   cirrhosis.  The remainder of the solid abdominal organs are unremarkable.       GI/Bowel: There is no bowel dilatation, wall thickening or obstruction.  The   appendix is normal.       Pelvis: The bladder and prostate are within normal limits.       Peritoneum/Retroperitoneum: There is no free air, free fluid or   intraperitoneal inflammatory change.  There is no adenopathy.  Mild   atherosclerotic changes are present within the aortoiliac system.       Bones/Soft Tissues: There is no fracture or osseous destruction.           Impression   Cirrhosis. L Hilar TBNA 2-8-11 - no malignant cells; FLOW normal polyclonal B/T cells    PFT 2-10-11 FEV1 2L 62% % DLCO 100%    Assessment:  · Moderate COPD on last PFT in 2011, with recent acute exacerbation   · Severe dyspnea on exertion   · Hypercapnic respiratory failure at [facility]  · Obesity  · Lower lobe bronchiectasis on abdominal CT   · Cirrhosis    Plan:   · Trial Anoro to replace Incruse  · PRN albuterol  · Will eventually need repeat ABG to establish baseline CO2  · Low dose chest CT for lung cancer screening is recommended, declined for now.  He is considering (too much testing going on right now)

## 2018-02-27 NOTE — PATIENT INSTRUCTIONS
Try replacing Incruse with Anoro, one inhalation daily. If doing well with Anoro, call for prescription. Do not take both anoro and incruse in the same day. Tips to Help You Stop Smoking (taken from Up-To-Date)      Cigarette smoking is a preventable cause of death in the [place]. Quitting smoking now can decrease your risk of getting smoking-related illnesses like heart disease, stroke, cancer & COPD. S = Set a quit date. T = Tell family, friends, and the people around you that you plan to quit. A = Anticipate or plan ahead for the tough times you'll face while quitting. R = Remove cigarettes and other tobacco products from your home, car, and work. T = Talk to your doctor about getting help to quit. Your doctor may give you medicines to reduce your craving for cigarettes & the unpleasant symptoms that happen when you stop smoking (called withdrawal symptoms). What are the symptoms of withdrawal?  The symptoms include:   ?Trouble sleeping   ? Being irritable, anxious or restless   ? Getting frustrated or angry   ? Having trouble thinking clearly  Nicotine replacement therapy eases withdrawal and reduces your bodys craving for nicotine, the main drug found in cigarettes. Non-prescription forms of nicotine replacement include skin patches, lozenges, and gum. Two prescription medications are available that have been proven to help people stop smoking:  ? Bupropion is a prescription medicine that reduces your desire to smoke. This medicine is sold under the brand names Zyban and Wellbutrin & as a generic formulation. ? Varenicline (brand name: Chantix) is a prescription medicine that reduces withdrawal symptoms and cigarette cravings. If you take bupropion or varenicline and you have any new or worsening depressed mood or thoughts of harming yourself or someone else, stop taking the medicine and call your doctor.   Buproprion should not be taken by anyone with a history of seizure or

## 2018-02-28 ENCOUNTER — TELEPHONE (OUTPATIENT)
Dept: PULMONOLOGY | Age: 63
End: 2018-02-28

## 2018-02-28 NOTE — TELEPHONE ENCOUNTER
Anoro is only indicated for COPD, so there is something wrong with the pamphlet. However, if he is having side effect such as chest discomfort, he should simply stop anoro and go back to incruse. The change was only to see if he would get additional symptom relief, and if he is feeling worse in any way, he should stop.

## 2018-03-08 DIAGNOSIS — G56.22 ULNAR NERVE ENTRAPMENT, LEFT: ICD-10-CM

## 2018-03-08 DIAGNOSIS — G56.02 CARPAL TUNNEL SYNDROME OF LEFT WRIST: ICD-10-CM

## 2018-03-08 RX ORDER — ALLOPURINOL 300 MG/1
TABLET ORAL
Qty: 30 TABLET | Refills: 0 | Status: SHIPPED | OUTPATIENT
Start: 2018-03-08 | End: 2018-03-20 | Stop reason: SDUPTHER

## 2018-03-08 RX ORDER — GABAPENTIN 300 MG/1
CAPSULE ORAL
Qty: 180 CAPSULE | Refills: 0 | Status: SHIPPED | OUTPATIENT
Start: 2018-03-08 | End: 2018-04-02 | Stop reason: SDUPTHER

## 2018-03-14 DIAGNOSIS — J44.1 COPD WITH ACUTE EXACERBATION (HCC): ICD-10-CM

## 2018-03-15 ENCOUNTER — TELEPHONE (OUTPATIENT)
Dept: FAMILY MEDICINE CLINIC | Age: 63
End: 2018-03-15

## 2018-03-16 ENCOUNTER — TELEPHONE (OUTPATIENT)
Dept: FAMILY MEDICINE CLINIC | Age: 63
End: 2018-03-16

## 2018-03-19 NOTE — TELEPHONE ENCOUNTER
Patient says his sugars have been running around 100 and 90. The highest he has had lately was 180 and then the next day it went down to 90.

## 2018-03-20 ENCOUNTER — OFFICE VISIT (OUTPATIENT)
Dept: ORTHOPEDIC SURGERY | Age: 63
End: 2018-03-20

## 2018-03-20 VITALS — HEIGHT: 67 IN | BODY MASS INDEX: 31.87 KG/M2 | WEIGHT: 203.04 LBS

## 2018-03-20 DIAGNOSIS — M25.561 RIGHT KNEE PAIN, UNSPECIFIED CHRONICITY: Primary | ICD-10-CM

## 2018-03-20 PROCEDURE — G8417 CALC BMI ABV UP PARAM F/U: HCPCS | Performed by: ORTHOPAEDIC SURGERY

## 2018-03-20 PROCEDURE — 3017F COLORECTAL CA SCREEN DOC REV: CPT | Performed by: ORTHOPAEDIC SURGERY

## 2018-03-20 PROCEDURE — 4004F PT TOBACCO SCREEN RCVD TLK: CPT | Performed by: ORTHOPAEDIC SURGERY

## 2018-03-20 PROCEDURE — 1111F DSCHRG MED/CURRENT MED MERGE: CPT | Performed by: ORTHOPAEDIC SURGERY

## 2018-03-20 PROCEDURE — 99212 OFFICE O/P EST SF 10 MIN: CPT | Performed by: ORTHOPAEDIC SURGERY

## 2018-03-20 PROCEDURE — G8427 DOCREV CUR MEDS BY ELIG CLIN: HCPCS | Performed by: ORTHOPAEDIC SURGERY

## 2018-03-20 PROCEDURE — G8484 FLU IMMUNIZE NO ADMIN: HCPCS | Performed by: ORTHOPAEDIC SURGERY

## 2018-03-20 RX ORDER — ALLOPURINOL 300 MG/1
TABLET ORAL
Qty: 30 TABLET | Refills: 0 | Status: SHIPPED | OUTPATIENT
Start: 2018-03-20 | End: 2018-04-30 | Stop reason: SDUPTHER

## 2018-03-20 NOTE — PROGRESS NOTES
Years of education: N/A     Occupational History    [employer]      not working currently     Social History Main Topics    Smoking status: Current Every Day Smoker     Packs/day: 2.00     Years: 35.00     Types: Cigarettes, Cigars    Smokeless tobacco: Former User     Types: Snuff      Comment: 2 ppd for more than 30 years, cutting down    Alcohol use No      Comment: last 1 week ago    Drug use: No    Sexual activity: Yes     Partners: Female     Other Topics Concern    Not on file     Social History Narrative    No narrative on file       Family History   Problem Relation Age of Onset    Cancer Mother      Lung    Emphysema Father     Diabetes Sister     Hypertension Sister     Cancer Brother      throat    Asthma Neg Hx         Review of Systems  Pertinent items are noted in HPI  Review of systems reviewed from Patient History Form dated on 10/13/17 and available in the patient's chart under the Media tab. Vital Signs  There were no vitals filed for this visit. General Exam:   Constitutional: Patient is adequately groomed with no evidence of malnutrition  Mental Status: The patient is oriented to time, place and person. The patient's mood and affect are appropriate. Lymphatic: The lymphatic examination bilaterally reveals all areas to be without enlargement or induration. Neurological: The patient has good coordination. There is no weakness or sensory deficit.      Gait: Mildly antalgic    Right knee Examination  Inspection:  No effusion right knee, no swelling or skin injury or deformity noted to right knee or leg    Palpation:  Tenderness medial joint line and medial aspect of leg, no tenderness lateral Joint line, no tenderness anterior joint line    Range of Motion:  Knee range of motion 0-130° with pain with hyperextension and hyperflexion    Sensation: In tact to light touch all nerve distributions     Strength:  Knee extension and flexion motor intact 5 out of 5    Special Tests: Order Specific Question:   Reason for exam:     Answer:   Right Knee Mensicus tear       Plan:   -We discussed his knee pain once again including and most likely diagnosis of meniscus tear. Given the fact that he had some improvement with the cortisone injection however it is worn off we will get an MRI scan to evaluate his knee for possible meniscus tear. We discussed that we could consider further nonsurgical treatment if he does have a tear however MRI will characterize exactly what going on with this knee  -He can continue with activity modification and ice  -I'll see him back after the MRI scan to discuss treatment options as well as the findings  -If there are issues in the interim he will contact the office      Voice Recognition Dictation disclaimer: Please note that portions of this chart were generated using Dragon dictation software. Although every effort was made to ensure the accuracy of this automated transcription, some errors in transcription may have occurred.

## 2018-03-22 ENCOUNTER — NURSE ONLY (OUTPATIENT)
Dept: FAMILY MEDICINE CLINIC | Age: 63
End: 2018-03-22

## 2018-03-22 DIAGNOSIS — E78.2 MIXED HYPERLIPIDEMIA: ICD-10-CM

## 2018-03-22 DIAGNOSIS — I10 BENIGN ESSENTIAL HTN: ICD-10-CM

## 2018-03-22 LAB
A/G RATIO: 1.2 (ref 1.1–2.2)
ALBUMIN SERPL-MCNC: 4.2 G/DL (ref 3.4–5)
ALP BLD-CCNC: 161 U/L (ref 40–129)
ALT SERPL-CCNC: 29 U/L (ref 10–40)
ANION GAP SERPL CALCULATED.3IONS-SCNC: 17 MMOL/L (ref 3–16)
AST SERPL-CCNC: 30 U/L (ref 15–37)
BILIRUB SERPL-MCNC: <0.2 MG/DL (ref 0–1)
BUN BLDV-MCNC: 12 MG/DL (ref 7–20)
CALCIUM SERPL-MCNC: 9.5 MG/DL (ref 8.3–10.6)
CHLORIDE BLD-SCNC: 98 MMOL/L (ref 99–110)
CHOLESTEROL, TOTAL: 167 MG/DL (ref 0–199)
CO2: 26 MMOL/L (ref 21–32)
CREAT SERPL-MCNC: 0.8 MG/DL (ref 0.8–1.3)
CREATININE URINE: 79.3 MG/DL (ref 39–259)
GFR AFRICAN AMERICAN: >60
GFR NON-AFRICAN AMERICAN: >60
GLOBULIN: 3.5 G/DL
GLUCOSE BLD-MCNC: 106 MG/DL (ref 70–99)
HDLC SERPL-MCNC: 45 MG/DL (ref 40–60)
LDL CHOLESTEROL CALCULATED: 95 MG/DL
MICROALBUMIN UR-MCNC: 6.1 MG/DL
MICROALBUMIN/CREAT UR-RTO: 76.9 MG/G (ref 0–30)
POTASSIUM SERPL-SCNC: 4.7 MMOL/L (ref 3.5–5.1)
SODIUM BLD-SCNC: 141 MMOL/L (ref 136–145)
TOTAL PROTEIN: 7.7 G/DL (ref 6.4–8.2)
TRIGL SERPL-MCNC: 133 MG/DL (ref 0–150)
VLDLC SERPL CALC-MCNC: 27 MG/DL

## 2018-03-22 PROCEDURE — 36415 COLL VENOUS BLD VENIPUNCTURE: CPT | Performed by: FAMILY MEDICINE

## 2018-03-22 RX ORDER — CALCIUM CITRATE/VITAMIN D3 200MG-6.25
TABLET ORAL
Qty: 50 EACH | Refills: 0 | Status: SHIPPED | OUTPATIENT
Start: 2018-03-22 | End: 2018-04-02 | Stop reason: SDUPTHER

## 2018-03-22 RX ORDER — BLOOD PRESSURE TEST KIT
KIT MISCELLANEOUS
Qty: 100 EACH | Refills: 0 | Status: SHIPPED | OUTPATIENT
Start: 2018-03-22 | End: 2018-04-02 | Stop reason: SDUPTHER

## 2018-03-23 ENCOUNTER — OFFICE VISIT (OUTPATIENT)
Dept: FAMILY MEDICINE CLINIC | Age: 63
End: 2018-03-23

## 2018-03-23 VITALS
SYSTOLIC BLOOD PRESSURE: 130 MMHG | OXYGEN SATURATION: 95 % | BODY MASS INDEX: 32.59 KG/M2 | HEIGHT: 66 IN | HEART RATE: 89 BPM | DIASTOLIC BLOOD PRESSURE: 72 MMHG | WEIGHT: 202.8 LBS

## 2018-03-23 DIAGNOSIS — E78.2 MIXED HYPERLIPIDEMIA: ICD-10-CM

## 2018-03-23 DIAGNOSIS — Z72.0 TOBACCO ABUSE: ICD-10-CM

## 2018-03-23 DIAGNOSIS — E66.01 MORBID OBESITY (HCC): ICD-10-CM

## 2018-03-23 DIAGNOSIS — F10.10 ALCOHOL ABUSE: ICD-10-CM

## 2018-03-23 DIAGNOSIS — L03.031 PARONYCHIA OF GREAT TOE OF RIGHT FOOT: ICD-10-CM

## 2018-03-23 DIAGNOSIS — I10 BENIGN ESSENTIAL HTN: ICD-10-CM

## 2018-03-23 DIAGNOSIS — Z87.891 PERSONAL HISTORY OF TOBACCO USE: ICD-10-CM

## 2018-03-23 DIAGNOSIS — Z87.891 HISTORY OF SMOKING 25-50 PACK YEARS: ICD-10-CM

## 2018-03-23 DIAGNOSIS — J41.8 MIXED SIMPLE AND MUCOPURULENT CHRONIC BRONCHITIS (HCC): ICD-10-CM

## 2018-03-23 DIAGNOSIS — K59.04 CHRONIC IDIOPATHIC CONSTIPATION: ICD-10-CM

## 2018-03-23 DIAGNOSIS — L60.0 INGROWN NAIL OF GREAT TOE OF RIGHT FOOT: ICD-10-CM

## 2018-03-23 LAB
ESTIMATED AVERAGE GLUCOSE: 214.5 MG/DL
HBA1C MFR BLD: 9.1 %

## 2018-03-23 PROCEDURE — 3017F COLORECTAL CA SCREEN DOC REV: CPT | Performed by: FAMILY MEDICINE

## 2018-03-23 PROCEDURE — 3023F SPIROM DOC REV: CPT | Performed by: FAMILY MEDICINE

## 2018-03-23 PROCEDURE — G8926 SPIRO NO PERF OR DOC: HCPCS | Performed by: FAMILY MEDICINE

## 2018-03-23 PROCEDURE — G8417 CALC BMI ABV UP PARAM F/U: HCPCS | Performed by: FAMILY MEDICINE

## 2018-03-23 PROCEDURE — 99214 OFFICE O/P EST MOD 30 MIN: CPT | Performed by: FAMILY MEDICINE

## 2018-03-23 PROCEDURE — 1111F DSCHRG MED/CURRENT MED MERGE: CPT | Performed by: FAMILY MEDICINE

## 2018-03-23 PROCEDURE — G8427 DOCREV CUR MEDS BY ELIG CLIN: HCPCS | Performed by: FAMILY MEDICINE

## 2018-03-23 PROCEDURE — G8484 FLU IMMUNIZE NO ADMIN: HCPCS | Performed by: FAMILY MEDICINE

## 2018-03-23 PROCEDURE — G0296 VISIT TO DETERM LDCT ELIG: HCPCS | Performed by: FAMILY MEDICINE

## 2018-03-23 PROCEDURE — 4004F PT TOBACCO SCREEN RCVD TLK: CPT | Performed by: FAMILY MEDICINE

## 2018-03-23 PROCEDURE — 3046F HEMOGLOBIN A1C LEVEL >9.0%: CPT | Performed by: FAMILY MEDICINE

## 2018-03-23 RX ORDER — BECLOMETHASONE DIPROPIONATE HFA 80 UG/1
2 AEROSOL, METERED RESPIRATORY (INHALATION) 2 TIMES DAILY
COMMUNITY
Start: 2018-03-16 | End: 2019-07-09 | Stop reason: CLARIF

## 2018-03-23 RX ORDER — CLOBETASOL PROPIONATE 0.5 MG/G
OINTMENT TOPICAL
COMMUNITY
Start: 2018-03-22 | End: 2019-07-09 | Stop reason: ALTCHOICE

## 2018-03-23 RX ORDER — CEPHALEXIN 500 MG/1
500 CAPSULE ORAL 3 TIMES DAILY
Qty: 21 CAPSULE | Refills: 0 | Status: SHIPPED | OUTPATIENT
Start: 2018-03-23 | End: 2018-03-30

## 2018-03-23 RX ORDER — LACTULOSE 10 G/15ML
SOLUTION ORAL
Qty: 1892 ML | Refills: 1
Start: 2018-03-23 | End: 2018-04-30 | Stop reason: SDUPTHER

## 2018-03-23 NOTE — PROGRESS NOTES
nail of great toe of right foot  cephALEXin (KEFLEX) 500 MG capsule   8. Paronychia of great toe of right foot  cephALEXin (KEFLEX) 500 MG capsule   9. Alcohol abuse     10. Chronic idiopathic constipation     11. Mixed simple and mucopurulent chronic bronchitis ([address]     12. Morbid obesity (HCC)     hemoglobin A1c has dropped from 11.5-9.1 over 2 months. Congratulated him on his efforts and asked him to continue efforts at weight loss. He is now using the Arlyne Oliver appropriately. He has discontinued alcohol. Patient continues to smoke. He knows he should not. He is aware of the risk. Blood pressures acceptable lipids been acceptable. We'll treat the paronychia. Constipation is at baseline. We reviewed his medications, and agree with him staying on the elliptical, Qvar, and pro-air when necessary. He is happy with the way his breathing is. He will follow-up again in 3 months. Check an A1c at that time as well. Patient should call the office immediately with new or ongoing signs or symptoms or worsening, or proceed to the emergency room. No changes in past medical history, past surgical history, social history, or family history were noted during the patient encounter unless specifically listed above. All updates of past medical history, past surgical history, social history, or family history were reviewed personally by me during the office visit. All problems listed in the assessment are stable unless noted otherwise. Medication profile reviewed personally by me during the office visit. Medication side effects and possible impairments from medications were discussed as applicable. This document was prepared by a combination of typing and transcription through a voice recognition software.        Scribe attestation: Abril Johnson am scribing for and in the presence of Abad Means MD. Electronically signed by Megan Oro on 3/23/2018 at 10:30 AM      Provider attestation: I, Dr. Yadira Rosas, personally performed the services described in this documentation, as scribed by the above signed scribe in my presence, and it is both accurate and complete. I agree with the ROS and Past Histories independently gathered by the clinical support staff and the remaining scribed note accurately describes my personal service to the patient.       3/23/2018    1:40 PM

## 2018-04-02 DIAGNOSIS — G56.22 ULNAR NERVE ENTRAPMENT, LEFT: ICD-10-CM

## 2018-04-02 DIAGNOSIS — G56.02 CARPAL TUNNEL SYNDROME OF LEFT WRIST: ICD-10-CM

## 2018-04-02 RX ORDER — CALCIUM CITRATE/VITAMIN D3 200MG-6.25
TABLET ORAL
Qty: 50 EACH | Refills: 3 | Status: SHIPPED | OUTPATIENT
Start: 2018-04-02 | End: 2018-07-05 | Stop reason: SDUPTHER

## 2018-04-02 RX ORDER — UBIQUINOL 100 MG
CAPSULE ORAL
Qty: 100 EACH | Refills: 3 | Status: SHIPPED | OUTPATIENT
Start: 2018-04-02 | End: 2018-07-26 | Stop reason: SDUPTHER

## 2018-04-02 RX ORDER — PEN NEEDLE, DIABETIC 31 GX5/16"
NEEDLE, DISPOSABLE MISCELLANEOUS
Qty: 100 EACH | Refills: 3 | Status: SHIPPED | OUTPATIENT
Start: 2018-04-02 | End: 2018-07-26 | Stop reason: SDUPTHER

## 2018-04-02 RX ORDER — GABAPENTIN 300 MG/1
CAPSULE ORAL
Qty: 180 CAPSULE | Refills: 1 | Status: SHIPPED | OUTPATIENT
Start: 2018-04-02 | End: 2018-05-16 | Stop reason: SDUPTHER

## 2018-04-06 ENCOUNTER — OFFICE VISIT (OUTPATIENT)
Dept: ORTHOPEDIC SURGERY | Age: 63
End: 2018-04-06

## 2018-04-06 VITALS — HEIGHT: 66 IN | WEIGHT: 202.82 LBS | BODY MASS INDEX: 32.6 KG/M2

## 2018-04-06 DIAGNOSIS — M87.9 OSTEONECROSIS OF KNEE REGION (HCC): Primary | ICD-10-CM

## 2018-04-06 PROCEDURE — G8427 DOCREV CUR MEDS BY ELIG CLIN: HCPCS | Performed by: ORTHOPAEDIC SURGERY

## 2018-04-06 PROCEDURE — 4004F PT TOBACCO SCREEN RCVD TLK: CPT | Performed by: ORTHOPAEDIC SURGERY

## 2018-04-06 PROCEDURE — 99212 OFFICE O/P EST SF 10 MIN: CPT | Performed by: ORTHOPAEDIC SURGERY

## 2018-04-06 PROCEDURE — 3017F COLORECTAL CA SCREEN DOC REV: CPT | Performed by: ORTHOPAEDIC SURGERY

## 2018-04-06 PROCEDURE — G8417 CALC BMI ABV UP PARAM F/U: HCPCS | Performed by: ORTHOPAEDIC SURGERY

## 2018-04-10 DIAGNOSIS — M89.8X6 PAIN OF RIGHT TIBIA: Primary | ICD-10-CM

## 2018-04-10 DIAGNOSIS — M25.561 RIGHT KNEE PAIN, UNSPECIFIED CHRONICITY: Primary | ICD-10-CM

## 2018-04-17 DIAGNOSIS — M54.50 LUMBAR SPINE PAIN: Primary | ICD-10-CM

## 2018-04-30 RX ORDER — LACTULOSE 10 G/15ML
SOLUTION ORAL
Qty: 1892 ML | Refills: 0 | Status: SHIPPED | OUTPATIENT
Start: 2018-04-30 | End: 2018-05-03 | Stop reason: SDUPTHER

## 2018-04-30 RX ORDER — ALLOPURINOL 300 MG/1
TABLET ORAL
Qty: 30 TABLET | Refills: 0 | Status: SHIPPED | OUTPATIENT
Start: 2018-04-30 | End: 2018-05-03 | Stop reason: SDUPTHER

## 2018-04-30 RX ORDER — ATORVASTATIN CALCIUM 40 MG/1
TABLET, FILM COATED ORAL
Qty: 30 TABLET | Refills: 0 | Status: SHIPPED | OUTPATIENT
Start: 2018-04-30 | End: 2018-05-03 | Stop reason: SDUPTHER

## 2018-05-03 RX ORDER — ALLOPURINOL 300 MG/1
TABLET ORAL
Qty: 30 TABLET | Refills: 0 | Status: SHIPPED | OUTPATIENT
Start: 2018-05-03 | End: 2018-06-04 | Stop reason: SDUPTHER

## 2018-05-03 RX ORDER — ATORVASTATIN CALCIUM 40 MG/1
TABLET, FILM COATED ORAL
Qty: 30 TABLET | Refills: 0 | Status: SHIPPED | OUTPATIENT
Start: 2018-05-03 | End: 2018-06-04 | Stop reason: SDUPTHER

## 2018-05-03 RX ORDER — LACTULOSE 10 G/15ML
SOLUTION ORAL
Qty: 1892 ML | Refills: 0 | Status: SHIPPED | OUTPATIENT
Start: 2018-05-03 | End: 2018-06-04 | Stop reason: SDUPTHER

## 2018-05-10 RX ORDER — GLUCOSAM/CHON-MSM1/C/MANG/BOSW 500-416.6
TABLET ORAL
Qty: 100 EACH | Refills: 3 | Status: SHIPPED | OUTPATIENT
Start: 2018-05-10 | End: 2018-08-16 | Stop reason: SDUPTHER

## 2018-05-16 DIAGNOSIS — R09.81 NASAL CONGESTION: ICD-10-CM

## 2018-05-16 DIAGNOSIS — G56.22 ULNAR NERVE ENTRAPMENT, LEFT: ICD-10-CM

## 2018-05-16 DIAGNOSIS — G56.02 CARPAL TUNNEL SYNDROME OF LEFT WRIST: ICD-10-CM

## 2018-05-16 RX ORDER — GABAPENTIN 300 MG/1
CAPSULE ORAL
Qty: 180 CAPSULE | Refills: 0 | Status: SHIPPED | OUTPATIENT
Start: 2018-05-16 | End: 2018-06-04 | Stop reason: SDUPTHER

## 2018-05-16 RX ORDER — FLUTICASONE PROPIONATE 50 MCG
SPRAY, SUSPENSION (ML) NASAL
Qty: 16 G | Refills: 0 | Status: SHIPPED | OUTPATIENT
Start: 2018-05-16 | End: 2018-07-02 | Stop reason: SDUPTHER

## 2018-06-04 DIAGNOSIS — G56.22 ULNAR NERVE ENTRAPMENT, LEFT: ICD-10-CM

## 2018-06-04 DIAGNOSIS — G56.02 CARPAL TUNNEL SYNDROME OF LEFT WRIST: ICD-10-CM

## 2018-06-04 RX ORDER — ATORVASTATIN CALCIUM 40 MG/1
TABLET, FILM COATED ORAL
Qty: 30 TABLET | Refills: 0 | Status: SHIPPED | OUTPATIENT
Start: 2018-06-04 | End: 2018-07-05 | Stop reason: SDUPTHER

## 2018-06-04 RX ORDER — RANITIDINE 300 MG/1
TABLET ORAL
Qty: 30 TABLET | Refills: 0 | Status: SHIPPED | OUTPATIENT
Start: 2018-06-04 | End: 2018-07-05 | Stop reason: SDUPTHER

## 2018-06-04 RX ORDER — GABAPENTIN 300 MG/1
CAPSULE ORAL
Qty: 180 CAPSULE | Refills: 0 | Status: SHIPPED | OUTPATIENT
Start: 2018-06-04 | End: 2018-06-25 | Stop reason: SDUPTHER

## 2018-06-04 RX ORDER — SPIRONOLACTONE 50 MG/1
TABLET, FILM COATED ORAL
Qty: 30 TABLET | Refills: 0 | Status: SHIPPED | OUTPATIENT
Start: 2018-06-04 | End: 2018-07-05 | Stop reason: SDUPTHER

## 2018-06-04 RX ORDER — ALLOPURINOL 300 MG/1
TABLET ORAL
Qty: 30 TABLET | Refills: 0 | Status: SHIPPED | OUTPATIENT
Start: 2018-06-04 | End: 2018-07-05 | Stop reason: SDUPTHER

## 2018-06-04 RX ORDER — LACTULOSE 10 G/15ML
SOLUTION ORAL
Qty: 1892 ML | Refills: 0 | Status: SHIPPED | OUTPATIENT
Start: 2018-06-04 | End: 2018-08-28 | Stop reason: ALTCHOICE

## 2018-06-04 RX ORDER — THIAMINE MONONITRATE (VIT B1) 100 MG
TABLET ORAL
Qty: 30 TABLET | Refills: 0 | Status: SHIPPED | OUTPATIENT
Start: 2018-06-04 | End: 2018-07-05 | Stop reason: SDUPTHER

## 2018-06-14 ENCOUNTER — TELEPHONE (OUTPATIENT)
Dept: FAMILY MEDICINE CLINIC | Age: 63
End: 2018-06-14

## 2018-06-14 DIAGNOSIS — E11.10 DIABETIC KETOACIDOSIS WITHOUT COMA ASSOCIATED WITH TYPE 2 DIABETES MELLITUS (HCC): ICD-10-CM

## 2018-06-14 RX ORDER — INSULIN GLARGINE 100 [IU]/ML
INJECTION, SOLUTION SUBCUTANEOUS
Qty: 15 ML | Refills: 0 | Status: SHIPPED | OUTPATIENT
Start: 2018-06-14 | End: 2018-07-23 | Stop reason: SDUPTHER

## 2018-06-25 ENCOUNTER — OFFICE VISIT (OUTPATIENT)
Dept: FAMILY MEDICINE CLINIC | Age: 63
End: 2018-06-25

## 2018-06-25 VITALS
WEIGHT: 201.6 LBS | HEIGHT: 66 IN | BODY MASS INDEX: 32.4 KG/M2 | DIASTOLIC BLOOD PRESSURE: 78 MMHG | SYSTOLIC BLOOD PRESSURE: 144 MMHG | OXYGEN SATURATION: 94 % | HEART RATE: 98 BPM

## 2018-06-25 DIAGNOSIS — G89.4 CHRONIC PAIN SYNDROME: ICD-10-CM

## 2018-06-25 DIAGNOSIS — I10 BENIGN ESSENTIAL HTN: ICD-10-CM

## 2018-06-25 DIAGNOSIS — G56.22 ULNAR NERVE ENTRAPMENT, LEFT: ICD-10-CM

## 2018-06-25 DIAGNOSIS — M15.9 OSTEOARTHRITIS OF MULTIPLE JOINTS, UNSPECIFIED OSTEOARTHRITIS TYPE: ICD-10-CM

## 2018-06-25 DIAGNOSIS — R03.0 ELEVATED BLOOD PRESSURE READING: ICD-10-CM

## 2018-06-25 DIAGNOSIS — K59.04 CHRONIC IDIOPATHIC CONSTIPATION: ICD-10-CM

## 2018-06-25 DIAGNOSIS — E78.2 MIXED HYPERLIPIDEMIA: ICD-10-CM

## 2018-06-25 DIAGNOSIS — J41.8 MIXED SIMPLE AND MUCOPURULENT CHRONIC BRONCHITIS (HCC): ICD-10-CM

## 2018-06-25 DIAGNOSIS — Z12.11 COLON CANCER SCREENING: ICD-10-CM

## 2018-06-25 DIAGNOSIS — J44.1 COPD WITH ACUTE EXACERBATION (HCC): ICD-10-CM

## 2018-06-25 DIAGNOSIS — Z72.0 TOBACCO ABUSE: ICD-10-CM

## 2018-06-25 DIAGNOSIS — G56.02 CARPAL TUNNEL SYNDROME OF LEFT WRIST: ICD-10-CM

## 2018-06-25 PROBLEM — E44.0 MODERATE PROTEIN-CALORIE MALNUTRITION (HCC): Status: RESOLVED | Noted: 2018-02-19 | Resolved: 2018-06-25

## 2018-06-25 LAB — HBA1C MFR BLD: 6.2 %

## 2018-06-25 PROCEDURE — 2022F DILAT RTA XM EVC RTNOPTHY: CPT | Performed by: FAMILY MEDICINE

## 2018-06-25 PROCEDURE — G8427 DOCREV CUR MEDS BY ELIG CLIN: HCPCS | Performed by: FAMILY MEDICINE

## 2018-06-25 PROCEDURE — G8926 SPIRO NO PERF OR DOC: HCPCS | Performed by: FAMILY MEDICINE

## 2018-06-25 PROCEDURE — 3023F SPIROM DOC REV: CPT | Performed by: FAMILY MEDICINE

## 2018-06-25 PROCEDURE — 3017F COLORECTAL CA SCREEN DOC REV: CPT | Performed by: FAMILY MEDICINE

## 2018-06-25 PROCEDURE — 3044F HG A1C LEVEL LT 7.0%: CPT | Performed by: FAMILY MEDICINE

## 2018-06-25 PROCEDURE — 36415 COLL VENOUS BLD VENIPUNCTURE: CPT | Performed by: FAMILY MEDICINE

## 2018-06-25 PROCEDURE — 99214 OFFICE O/P EST MOD 30 MIN: CPT | Performed by: FAMILY MEDICINE

## 2018-06-25 PROCEDURE — G8417 CALC BMI ABV UP PARAM F/U: HCPCS | Performed by: FAMILY MEDICINE

## 2018-06-25 PROCEDURE — 83036 HEMOGLOBIN GLYCOSYLATED A1C: CPT | Performed by: FAMILY MEDICINE

## 2018-06-25 PROCEDURE — 4004F PT TOBACCO SCREEN RCVD TLK: CPT | Performed by: FAMILY MEDICINE

## 2018-06-25 RX ORDER — GABAPENTIN 300 MG/1
900 CAPSULE ORAL 3 TIMES DAILY
Qty: 270 CAPSULE | Refills: 0 | Status: SHIPPED | OUTPATIENT
Start: 2018-06-25 | End: 2018-07-05 | Stop reason: SDUPTHER

## 2018-06-25 ASSESSMENT — PATIENT HEALTH QUESTIONNAIRE - PHQ9
SUM OF ALL RESPONSES TO PHQ QUESTIONS 1-9: 0
SUM OF ALL RESPONSES TO PHQ9 QUESTIONS 1 & 2: 0
1. LITTLE INTEREST OR PLEASURE IN DOING THINGS: 0
2. FEELING DOWN, DEPRESSED OR HOPELESS: 0

## 2018-06-26 ENCOUNTER — TELEPHONE (OUTPATIENT)
Dept: FAMILY MEDICINE CLINIC | Age: 63
End: 2018-06-26

## 2018-06-26 LAB
ESTIMATED AVERAGE GLUCOSE: 148.5 MG/DL
HBA1C MFR BLD: 6.8 %

## 2018-07-02 DIAGNOSIS — R09.81 NASAL CONGESTION: ICD-10-CM

## 2018-07-02 RX ORDER — FLUTICASONE PROPIONATE 50 MCG
SPRAY, SUSPENSION (ML) NASAL
Qty: 16 G | Refills: 0 | Status: SHIPPED | OUTPATIENT
Start: 2018-07-02 | End: 2018-08-29 | Stop reason: SDUPTHER

## 2018-07-02 NOTE — TELEPHONE ENCOUNTER
Raeann Koehlers is requesting refill(s)   Last OV 6/25/18 (pertaining to medication)  LR 5/16/18 (per medication requested)  Next office visit scheduled or attempted Yes   If no, reason:  10/26/18

## 2018-07-05 DIAGNOSIS — G56.22 ULNAR NERVE ENTRAPMENT, LEFT: ICD-10-CM

## 2018-07-05 DIAGNOSIS — G56.02 CARPAL TUNNEL SYNDROME OF LEFT WRIST: ICD-10-CM

## 2018-07-05 RX ORDER — ALLOPURINOL 300 MG/1
TABLET ORAL
Qty: 30 TABLET | Refills: 0 | Status: SHIPPED | OUTPATIENT
Start: 2018-07-05 | End: 2018-07-26 | Stop reason: SDUPTHER

## 2018-07-05 RX ORDER — CALCIUM CITRATE/VITAMIN D3 200MG-6.25
TABLET ORAL
Qty: 50 EACH | Refills: 0 | Status: SHIPPED | OUTPATIENT
Start: 2018-07-05 | End: 2018-07-26 | Stop reason: SDUPTHER

## 2018-07-05 RX ORDER — ATORVASTATIN CALCIUM 40 MG/1
TABLET, FILM COATED ORAL
Qty: 30 TABLET | Refills: 0 | Status: SHIPPED | OUTPATIENT
Start: 2018-07-05 | End: 2018-07-26 | Stop reason: SDUPTHER

## 2018-07-05 NOTE — TELEPHONE ENCOUNTER
Carlos Byrne is requesting refill(s)   Last OV 6/25/18 (pertaining to medication)  LR 6/4/18 (per medication requested)  Next office visit scheduled or attempted Yes   If no, reason:  10/26/18

## 2018-07-05 NOTE — TELEPHONE ENCOUNTER
Uriel Carrillo is requesting refill(s)   Last OV 6/25/18 (pertaining to medication)  LR 64/18---6/25/18 (per medication requested)  Next office visit scheduled or attempted Yes   If no, reason:  10/26/18

## 2018-07-06 RX ORDER — RANITIDINE 300 MG/1
TABLET ORAL
Qty: 30 TABLET | Refills: 0 | Status: ON HOLD | OUTPATIENT
Start: 2018-07-06 | End: 2018-07-23 | Stop reason: HOSPADM

## 2018-07-06 RX ORDER — GABAPENTIN 300 MG/1
CAPSULE ORAL
Qty: 270 CAPSULE | Refills: 0 | Status: SHIPPED | OUTPATIENT
Start: 2018-07-06 | End: 2019-10-15

## 2018-07-06 RX ORDER — DILTIAZEM HYDROCHLORIDE 240 MG/1
CAPSULE, COATED, EXTENDED RELEASE ORAL
Qty: 30 CAPSULE | Refills: 0 | Status: SHIPPED | OUTPATIENT
Start: 2018-07-06 | End: 2018-07-26 | Stop reason: SDUPTHER

## 2018-07-06 RX ORDER — THIAMINE MONONITRATE (VIT B1) 100 MG
TABLET ORAL
Qty: 30 TABLET | Refills: 0 | Status: SHIPPED | OUTPATIENT
Start: 2018-07-06 | End: 2018-07-26 | Stop reason: SDUPTHER

## 2018-07-06 RX ORDER — SPIRONOLACTONE 50 MG/1
TABLET, FILM COATED ORAL
Qty: 30 TABLET | Refills: 0 | Status: SHIPPED | OUTPATIENT
Start: 2018-07-06 | End: 2018-07-26 | Stop reason: SDUPTHER

## 2018-07-12 ENCOUNTER — HOSPITAL ENCOUNTER (OUTPATIENT)
Dept: OTHER | Age: 63
Discharge: HOME | End: 2018-07-12
Attending: INTERNAL MEDICINE | Admitting: INTERNAL MEDICINE

## 2018-07-12 ENCOUNTER — INITIAL CONSULT (OUTPATIENT)
Dept: GASTROENTEROLOGY | Age: 63
End: 2018-07-12

## 2018-07-12 VITALS
DIASTOLIC BLOOD PRESSURE: 70 MMHG | BODY MASS INDEX: 35.36 KG/M2 | SYSTOLIC BLOOD PRESSURE: 138 MMHG | HEIGHT: 66 IN | WEIGHT: 220 LBS

## 2018-07-12 DIAGNOSIS — K59.00 CONSTIPATION, UNSPECIFIED CONSTIPATION TYPE: Primary | ICD-10-CM

## 2018-07-12 DIAGNOSIS — K62.5 RECTAL BLEEDING: ICD-10-CM

## 2018-07-12 DIAGNOSIS — Z80.0 FAMILY HISTORY OF RECTAL CANCER: ICD-10-CM

## 2018-07-12 DIAGNOSIS — K70.30 ALCOHOLIC CIRRHOSIS OF LIVER WITHOUT ASCITES (HCC): ICD-10-CM

## 2018-07-12 LAB
A/G RATIO: 1.1 (ref 1.1–2.2)
ALBUMIN SERPL-MCNC: 4.3 G/DL (ref 3.4–5)
ALP BLD-CCNC: 164 U/L (ref 40–129)
ALT SERPL-CCNC: 23 U/L (ref 10–40)
ANION GAP SERPL CALCULATED.3IONS-SCNC: 14 MMOL/L (ref 3–16)
AST SERPL-CCNC: 25 U/L (ref 15–37)
BASOPHILS ABSOLUTE: 0.1 K/UL (ref 0–0.2)
BASOPHILS RELATIVE PERCENT: 0.8 %
BILIRUB SERPL-MCNC: <0.2 MG/DL (ref 0–1)
BUN BLDV-MCNC: 7 MG/DL (ref 7–20)
CALCIUM SERPL-MCNC: 9.9 MG/DL (ref 8.3–10.6)
CHLORIDE BLD-SCNC: 97 MMOL/L (ref 99–110)
CO2: 27 MMOL/L (ref 21–32)
CREAT SERPL-MCNC: 0.8 MG/DL (ref 0.8–1.3)
EOSINOPHILS ABSOLUTE: 0.1 K/UL (ref 0–0.6)
EOSINOPHILS RELATIVE PERCENT: 1.1 %
FERRITIN: 15.5 NG/ML (ref 30–400)
GFR AFRICAN AMERICAN: >60
GFR NON-AFRICAN AMERICAN: >60
GLOBULIN: 4 G/DL
GLUCOSE BLD-MCNC: 104 MG/DL (ref 70–99)
HAV IGM SER IA-ACNC: NORMAL
HCT VFR BLD CALC: 39 % (ref 40.5–52.5)
HEMOGLOBIN: 13.1 G/DL (ref 13.5–17.5)
HEPATITIS B CORE IGM ANTIBODY: NORMAL
HEPATITIS B SURFACE ANTIGEN INTERPRETATION: NORMAL
HEPATITIS C ANTIBODY INTERPRETATION: NORMAL
INR BLD: 1.01 (ref 0.86–1.14)
IRON SATURATION: 5 % (ref 20–50)
IRON: 24 UG/DL (ref 59–158)
LYMPHOCYTES ABSOLUTE: 2.4 K/UL (ref 1–5.1)
LYMPHOCYTES RELATIVE PERCENT: 21.6 %
MCH RBC QN AUTO: 27.6 PG (ref 26–34)
MCHC RBC AUTO-ENTMCNC: 33.4 G/DL (ref 31–36)
MCV RBC AUTO: 82.6 FL (ref 80–100)
MONOCYTES ABSOLUTE: 1 K/UL (ref 0–1.3)
MONOCYTES RELATIVE PERCENT: 8.8 %
NEUTROPHILS ABSOLUTE: 7.4 K/UL (ref 1.7–7.7)
NEUTROPHILS RELATIVE PERCENT: 67.7 %
PDW BLD-RTO: 18.1 % (ref 12.4–15.4)
PLATELET # BLD: 357 K/UL (ref 135–450)
PMV BLD AUTO: 7.4 FL (ref 5–10.5)
POTASSIUM SERPL-SCNC: 4.1 MMOL/L (ref 3.5–5.1)
PROTHROMBIN TIME: 11.5 SEC (ref 9.8–13)
RBC # BLD: 4.72 M/UL (ref 4.2–5.9)
SODIUM BLD-SCNC: 138 MMOL/L (ref 136–145)
TOTAL IRON BINDING CAPACITY: 449 UG/DL (ref 260–445)
TOTAL PROTEIN: 8.3 G/DL (ref 6.4–8.2)
TSH REFLEX: 2.8 UIU/ML (ref 0.27–4.2)
WBC # BLD: 10.9 K/UL (ref 4–11)

## 2018-07-12 PROCEDURE — 99204 OFFICE O/P NEW MOD 45 MIN: CPT | Performed by: INTERNAL MEDICINE

## 2018-07-12 RX ORDER — POLYETHYLENE GLYCOL 3350, SODIUM CHLORIDE, POTASSIUM CHLORIDE, SODIUM BICARBONATE, AND SODIUM SULFATE 240; 5.84; 2.98; 6.72; 22.72 G/4L; G/4L; G/4L; G/4L; G/4L
4000 POWDER, FOR SOLUTION ORAL ONCE
Qty: 1 BOTTLE | Refills: 0 | Status: SHIPPED | OUTPATIENT
Start: 2018-07-12 | End: 2018-07-12

## 2018-07-12 NOTE — LETTER
[address]  [place], [facility] [phone]   [phone]    07/12/18  Patient: Mary Beth Swain   MR Number: [id number]   YOB: 1955   Date of Visit: 7/12/18     Dear Dr. Elli Christopher MD    Thank you for the request for consultation for Mary Beth Swain to me for the evaluation of   Chief Complaint   Patient presents with   [address]     NP- rectal bleeding, abd pain, brother- rectal ca   . Below are the relevant portions of my assessment and plan of care. FINAL DIAGNOSIS/Assessment   Diagnosis Orders   1. Constipation, unspecified constipation type     2. Rectal bleeding  bisacodyl (DULCOLAX) 5 MG EC tablet    polyethylene glycol-electrolytes (COLYTE) 240 g SOLR solution    magnesium citrate solution    COLONOSCOPY W/ OR W/O BIOPSY   3. Family history of rectal cancer  COLONOSCOPY W/ OR W/O BIOPSY   4. Alcoholic cirrhosis of liver without ascites (HCC)  Protime-INR    TSH with Reflex    CBC WITH AUTO DIFFERENTIAL    Comprehensive Metabolic Panel    AFP TUMOR MARKER    US Gallbladder Ruq    Yanci Titer IgG by IFA Reflex    Ceruloplasmin    F-actin (smooth muscle) antibody w/ reflex to titer    Mitochondrial antibodies, M2    Hepatitis Panel, Acute    Iron and TIBC    Ferritin    EGD       VISIT ORDERS/Plan  Orders Placed This Encounter   Procedures    COLONOSCOPY W/ OR W/O BIOPSY     Standing Status:   Future     Standing Expiration Date:   7/12/2019     Scheduling Instructions: With diprivan     Order Specific Question:   Screening or Diagnostic? Answer:   Diagnostic    US Gallbladder Ruq     Standing Status:   Future     Standing Expiration Date:   7/12/2019    Protime-INR     Standing Status:   Future     Standing Expiration Date:   7/12/2019     Order Specific Question:   Daily Coumadin Dose?      Answer:   cirrhosis    TSH with Reflex     Standing Status:   Future     Standing Expiration Date:   7/12/2019  CBC WITH AUTO DIFFERENTIAL     Standing Status:   Future     Standing Expiration Date:   7/12/2019    Comprehensive Metabolic Panel     Standing Status:   Future     Standing Expiration Date:   7/12/2019    AFP TUMOR MARKER     Standing Status:   Future     Standing Expiration Date:   7/12/2019    Yanci Titer IgG by IFA Reflex     Standing Status:   Future     Standing Expiration Date:   7/12/2019    Ceruloplasmin     Standing Status:   Future     Standing Expiration Date:   7/12/2019    F-actin (smooth muscle) antibody w/ reflex to titer     Standing Status:   Future     Standing Expiration Date:   7/12/2019    Mitochondrial antibodies, M2     Standing Status:   Future     Standing Expiration Date:   7/12/2019    Hepatitis Panel, Acute     Standing Status:   Future     Standing Expiration Date:   7/12/2019    Iron and TIBC     Standing Status:   Future     Standing Expiration Date:   7/12/2019     Order Specific Question:   Is Patient Fasting? Answer:   no     Order Specific Question:   No of Hours? Answer:   >12    Ferritin     Standing Status:   Future     Standing Expiration Date:   7/12/2019    EGD     Standing Status:   Future     Standing Expiration Date:   8/12/2018     Order Specific Question:   Screening or Diagnostic? Answer:   Diagnostic       If you have questions, please do not hesitate to call me. I look forward to following Aaron Ragland along with you.     Sincerely,        [facility] 7/12/18 10:04 AM

## 2018-07-12 NOTE — LETTER
[address] [facility]  [facility] [address]                                       p [phone]  f [phone]    Kim Briceno MD                        [address] 18    2:05 PM    Facility: [facility] ENDO                                                             Procedure Date & Time: 18   @9:40am   Pt arrival: 8:40am                                                                                    Patient Name:  Cristi Hoskins     :  1955 PCP:  Oliver Marroquin MD       Home Ph:    [phone] (home) [phone] (work)          SSN:                                         PROCEDURE:  Colonoscopy, possible polypectomy         [id number]                            EGD                                                           [id number]    DIAGNOSIS: Rectal bleeding                                             K62.5                        Family hx of rectal cancer                             Z80.0                        Cirrhosis                                                        K70.30       Anesthesia: _Yes_  Time Needed: 30 minutes  Pt Position:  lateral, right side up         Outpatient _X_                                    __x__PREP:Golytely                             _____Cardiac Clearance by; ___________     Medications to be stopped 5 days before procedure: _________  Additional / Special Orders:                                                                                                                                                                                               Cristi Hoskins    1955                                                    Endoscopy Order   IN ACCORDANCE WITH OUR FORMULARY SYSTEM, A GENERIC EQUIVALENT DRUG MAY BE DISPNSED AND ADMINISTERED UNLESS D. A. W. IS WRITTEN WITH THE MEDICATION ORDER.   DATE HOUR PHYSICIAN:  RECORD DATE, HOUR AND SIGN EACH ENTRY 07/23/18 9:40am 1)  Admit for:   [x]Colonoscopy  [x]EGD     [x]Anesthesia/MAC        []ERCP     []Upper EUS     []Lower EUS                             2)  Diagnosis: rectal bleeding, family hx of rectal cancer & cirrhosis. 3)  Establish IV access     Solution:  [x]0.9 Normal Saline   []Lactated Ringers    []Other:                            Rate:   [x]KVO      []Other:     4)  Check blood sugar on all diabetic patients       Urine Pregnancy test on all menstruating females     5)  Labs:       []PT/INR         []Platelet       []Other:____________     6)  Procedure Medications:     []Fentanyl ______micrograms IV   []Demerol ________mg IV     []Versed _______mg IV                          []Other:     7) [x]Dinemap      [x]Pulse Oximetry    [x]Cardiac Monitor     8)  Post Procedure:       [x]Titrate O2 to keep O2 Sat.  > 90%     [x]Discharge instructions per                                                                     Endoscopy Protocol     [x]Discharge home when awake and vital signs stable                                                                          Signature:         Date:7/12/18               Time: 2:05 PM   PHYSICIANS ORDERS

## 2018-07-12 NOTE — PROGRESS NOTES
Rojelio Hendrix    [address] [facility] ,  [address] [facility]  Phone: [phone]    CHIEF COMPLAINT     Chief Complaint   Patient presents with   [address]     NP- rectal bleeding, abd pain, brother- rectal ca       HPI     Thank you Renay Goodell, MD for asking me to see Trina Barrientos in consultation. He is a  [4] White [1] 65 y.o. . male seen independently who presents with the following GI complaints:  . Trina Barrientos  Complains of rectal bleeding and pain present 1 week, severe worse with constipation which has been present for at least the past year shortly after being started on narcotics unrelieved with miralax and side effects with lactulose, no other associated signs/symptoms. Brother just diagnosed with rectal cancer in his 46s. Has a significant drinking history and still drinks 2-3 beers/d despite CT in February showing cirrhosis. Denies being told he has cirrhosis. Say Warren was seen today for establish care. Diagnoses and all orders for this visit:    Constipation, unspecified constipation type    Rectal bleeding  -     bisacodyl (DULCOLAX) 5 MG EC tablet; Take 1 tablet by mouth daily as needed for Constipation  -     polyethylene glycol-electrolytes (COLYTE) 240 g SOLR solution; Take 4,000 mLs by mouth once for 1 dose  -     magnesium citrate solution; Take 296 mLs by mouth once for 1 dose  -     COLONOSCOPY W/ OR W/O BIOPSY; Future    Family history of rectal cancer  -     COLONOSCOPY W/ OR W/O BIOPSY; Future    Alcoholic cirrhosis of liver without ascites ([facility] [address]  -     Protime-INR; Future  -     TSH with Reflex; Future  -     CBC WITH AUTO DIFFERENTIAL; Future  -     Comprehensive Metabolic Panel; Future  -     AFP TUMOR MARKER; Future  -     US Gallbladder Ruq; Future  -     Yanci Titer IgG by IFA Reflex; Future  -     Ceruloplasmin; Future  -     F-actin (smooth muscle) antibody w/ reflex to titer;  Future  -     Mitochondrial antibodies, M2; Future  -     Hepatitis Panel, Acute; Future  -     Iron and TIBC; Future  -     Ferritin; Future  -     EGD; Future      HPI elements: location, severity, timing, modifying factors, quality, duration, context and associated signs/symptoms. Last Encounter Reviewed:  Pertinent PMH, FH, SH is reviewed below.   Last EGD: none  Last Colonoscopy: none    No components found for: HGBA1C  BP Readings from Last 3 Encounters:   07/12/18 138/70   06/25/18 (!) 144/78   03/23/18 130/72     Health Maintenance   Topic Date Due    Shingles Vaccine (1 of 2 - 2 Dose Series) 10/14/2006    Low dose CT lung screening  05/06/2012    DTaP/Tdap/Td vaccine (1 - Tdap) 09/17/2018 (Originally 11/4/2014)    Diabetic foot exam  06/21/2019 (Originally 10/14/1965)    Pneumococcal med risk (1 of 1 - PPSV23) 09/16/2030 (Originally 10/14/1975)    Diabetic retinal exam  02/15/2019    Colon Cancer Screen FIT/FOBT  02/18/2019    Diabetic microalbuminuria test  03/22/2019    Lipid screen  03/22/2019    A1C test (Diabetic or Prediabetic)  06/25/2019    Potassium monitoring  07/12/2019    Creatinine monitoring  07/12/2019    Hepatitis C screen  Completed    HIV screen  Completed       No components found for: [address]     Past Medical History:   Diagnosis Date    Bronchitis chronic     Chest pain     COPD (chronic obstructive pulmonary disease) ([facility] [address]     COPD (chronic obstructive pulmonary disease) (HCC)     Gout     Hilar adenopathy     Hyperlipidemia     Hypertension     Knee pain, right     Numbness and tingling of leg     Osteoarthritis     Paresthesia of bilateral legs     Seizures (HCC)     ongoing, began after swine flu vaccination    Substance abuse      FAMILY HISTORY     Family History   Problem Relation Age of Onset    Cancer Mother         Lung    Emphysema Father     Diabetes Sister     Hypertension Sister     Cancer Brother         throat    Asthma Neg Hx SOCIAL HISTORY     Social History     Social History    Marital status:      Spouse name: N/A    Number of children: N/A    Years of education: N/A     Occupational History    [employer]      not working currently     Social History Main Topics    Smoking status: Current Every Day Smoker     Packs/day: 2.00     Years: 35.00     Types: Cigarettes, Cigars    Smokeless tobacco: Former User     Types: Snuff      Comment: 2 ppd for more than 30 years, cutting down    Alcohol use No      Comment: last 1 week ago    Drug use: No    Sexual activity: Yes     Partners: Female     Other Topics Concern    Not on file     Social History Narrative    No narrative on file     SURGICAL HISTORY     Past Surgical History:   Procedure Laterality Date    BRONCHOSCOPY  2/7/2011    bronch with EBUS    CYSTOSCOPY  2/4/15    Urethral Dilatation, Resection of Bladder Tumor    CYSTOSCOPY  2/24/2016    fulgeration of bladder tumor    ELBOW SURGERY Left     OTHER SURGICAL HISTORY  05-    cystoscopy, bladder biopsy    OTHER SURGICAL HISTORY  09/09/2015    cystoscopy, urethral dilatation, bladder tumor follow up     [address]   (This list may include medications prescribed during this encounter as epic can not insert only the list prior to this encounter.)  Current Outpatient Rx   Medication Sig Dispense Refill    bisacodyl (DULCOLAX) 5 MG EC tablet Take 1 tablet by mouth daily as needed for Constipation 4 tablet 0    magnesium citrate solution Take 296 mLs by mouth once for 1 dose 296 mL 0    diltiazem (CARDIZEM CD) 240 MG extended release capsule TAKE ONE (1) capsule BY MOUTH DAILY 30 capsule 0    gabapentin (NEURONTIN) 300 MG capsule TAKE three (3) capsules BY MOUTH THREE TIMES DAILY 270 capsule 0    ranitidine (ZANTAC) 300 MG tablet TAKE 1 TABLET AT BEDTIME 30 tablet 0    spironolactone (ALDACTONE) 50 MG tablet TAKE 1 TABLET BY MOUTH DAILY 30 tablet 0    metFORMIN (GLUCOPHAGE) 500 MG tablet TAKE ONE TABLET BY MOUTH 2 TIMES DAILY WITH MEALS 60 tablet 0    vitamin B-1 (THIAMINE) 100 MG tablet TAKE 1 TABLET DAILY 30 tablet 0    TRUE METRIX BLOOD GLUCOSE TEST strip TEST 2 TIMES DAILY 50 each 0    atorvastatin (LIPITOR) 40 MG tablet TAKE ONE (1) tablet BY MOUTH DAILY 30 tablet 0    allopurinol (ZYLOPRIM) 300 MG tablet TAKE ONE (1) tablet BY MOUTH DAILY 30 tablet 0    fluticasone (FLONASE) 50 MCG/ACT nasal spray USE 1 SPRAY IN EACH NOSTRIL DAILY 16 g 0    QVAR 80 MCG/ACT inhaler USE 1 PUFF 2 TIMES DAILY 8.7 g 0    MUCINEX 600 MG extended release tablet TAKE 1 TABLET BY MOUTH 2 TIMES DAILY 60 tablet 0    BASAGLAR KWIKPEN 100 UNIT/ML injection pen INJECT 25 UNITS INTO THE SKIN DAILY WITH SUPPER 15 mL 0    PROAIR  (90 Base) MCG/ACT inhaler INHALE 2 PUFFS EVERY 6 HOURS AS NEEDED FOR WHEEZING 8.5 g 0    lactulose (CHRONULAC) 10 GM/15ML solution TAKE 30 ML BY MOUTH 2 TIMES DAILY 1892 mL 0    TRUEPLUS LANCETS 30G MISC TEST 2 TIMES DAILY 100 each 3    Alcohol Swabs (ALCOHOL PREP) 70 % PADS USE AS DIRECTED 100 each 3    UNIFINE PENTIPS 31G X 8 MM MISC USE ONCE DAILY WITH INJECTION 100 each 3    clobetasol (TEMOVATE) 0.05 % ointment       QVAR REDIHALER 80 MCG/ACT AERB inhaler 2 puffs 2 times daily       ipratropium-albuterol (DUONEB) 0.5-2.5 (3) MG/3ML SOLN nebulizer solution Inhale 1 vial into the lungs 4 times daily      ketoconazole (NIZORAL) 2 % cream Apply BID 60 g 1    nystatin (MYCOSTATIN) 057178 UNIT/ML suspension Take 5 mLs by mouth 4 times daily 473 mL 0    Umeclidinium Bromide (INCRUSE ELLIPTA) 62.5 MCG/INH AEPB 1 inhalation daily 1 each 11    oxyCODONE (ROXICODONE) 5 MG immediate release tablet       folic acid (FOLVITE) 1 MG tablet Take 1 tablet by mouth daily.  30 tablet 11    finasteride (PROSCAR) 5 MG tablet       ondansetron (ZOFRAN ODT) 4 MG disintegrating tablet Take 1 tablet by mouth every 8 hours as needed for Nausea or Vomiting 12 tablet 0    Nebulizers discharge. Neck: Normal range of motion, No tenderness, Supple, No stridor. Lymphatic: No lymphadenopathy noted. Cardiovascular: Normal heart rate, Normal rhythm, No murmurs, No rubs, No gallops. Thorax & Lungs: Normal breath sounds, No respiratory distress, No wheezing, No chest tenderness. Abdomen: scars consistent with stated surgeries,  Soft NTND   Rectal:  Deferred. Skin: Warm, Dry, No erythema, No rash. Back: No tenderness, No CVA tenderness. Extremities: Intact distal pulses, No edema, No tenderness, No cyanosis, No clubbing. Neurologic: Alert & oriented x 3, Normal motor function, Normal sensory function, No focal deficits noted. RADIOLOGY/PROCEDURES         FINAL IMPRESSION     Orders Placed This Encounter   Procedures    COLONOSCOPY W/ OR W/O BIOPSY     Standing Status:   Future     Standing Expiration Date:   7/12/2019     Scheduling Instructions: With diprivan     Order Specific Question:   Screening or Diagnostic? Answer:   Diagnostic    US Gallbladder Ruq     Standing Status:   Future     Standing Expiration Date:   7/12/2019    Protime-INR     Standing Status:   Future     Number of Occurrences:   1     Standing Expiration Date:   7/12/2019     Order Specific Question:   Daily Coumadin Dose?      Answer:   cirrhosis    TSH with Reflex     Standing Status:   Future     Number of Occurrences:   1     Standing Expiration Date:   7/12/2019    CBC WITH AUTO DIFFERENTIAL     Standing Status:   Future     Number of Occurrences:   1     Standing Expiration Date:   7/12/2019    Comprehensive Metabolic Panel     Standing Status:   Future     Number of Occurrences:   1     Standing Expiration Date:   7/12/2019    AFP TUMOR MARKER     Standing Status:   Future     Number of Occurrences:   1     Standing Expiration Date:   7/12/2019    Yanci Titer IgG by IFA Reflex     Standing Status:   Future     Number of Occurrences:   1     Standing Expiration Date:   7/12/2019   Mike Lewis Ceruloplasmin     Standing Status:   Future     Number of Occurrences:   1     Standing Expiration Date:   7/12/2019    F-actin (smooth muscle) antibody w/ reflex to titer     Standing Status:   Future     Number of Occurrences:   1     Standing Expiration Date:   7/12/2019    Mitochondrial antibodies, M2     Standing Status:   Future     Number of Occurrences:   1     Standing Expiration Date:   7/12/2019    Hepatitis Panel, Acute     Standing Status:   Future     Number of Occurrences:   1     Standing Expiration Date:   7/12/2019    Iron and TIBC     Standing Status:   Future     Number of Occurrences:   1     Standing Expiration Date:   7/12/2019     Order Specific Question:   Is Patient Fasting? Answer:   no     Order Specific Question:   No of Hours? Answer:   >12    Ferritin     Standing Status:   Future     Number of Occurrences:   1     Standing Expiration Date:   7/12/2019    EGD     Standing Status:   Future     Standing Expiration Date:   8/12/2018     Order Specific Question:   Screening or Diagnostic? Answer:   Ramin Pagan was seen today for establish care. Diagnoses and all orders for this visit:    Constipation, unspecified constipation type    Rectal bleeding  -     bisacodyl (DULCOLAX) 5 MG EC tablet; Take 1 tablet by mouth daily as needed for Constipation  -     polyethylene glycol-electrolytes (COLYTE) 240 g SOLR solution; Take 4,000 mLs by mouth once for 1 dose  -     magnesium citrate solution; Take 296 mLs by mouth once for 1 dose  -     COLONOSCOPY W/ OR W/O BIOPSY; Future    Family history of rectal cancer  -     COLONOSCOPY W/ OR W/O BIOPSY; Future    Alcoholic cirrhosis of liver without ascites ([facility] [address]  -     Protime-INR; Future  -     TSH with Reflex; Future  -     CBC WITH AUTO DIFFERENTIAL; Future  -     Comprehensive Metabolic Panel; Future  -     AFP TUMOR MARKER; Future  -     US Gallbladder Ruq; Future  -     Yanci Titer IgG by IFA Reflex;  Future  -

## 2018-07-12 NOTE — LETTER
COLONOSCOPY PREP INSTRUCTIONS  COLYTE SPLIT DOSE  [address] [facility], [address] [place] [address] , [place], [facility]     Your colonoscopy is scheduled on: ___7/23/18_____   __Shantanu/Oj___  Arrival Time: ___8:40 AM___   DO NOT EAT OR DRINK (INCLUDING WATER) AFTER: ____4:40 AM after drinking the 2nd dose of prep____  's Name_Steve_ [phone]    Keep these papers together; REVIEW ALL OF THEM AT LEAST 7 DAYS BEFORE THE PROCEDURE. Please complete all paperwork; including a current list of your medications, to avoid delays in the admission process. The following instructions must be followed in order to ensure your procedure has optimal outcomes. - KEEP YOUR APPOINTMENT. If for any reason, you are unable to keep your appointment, please notify us at [phone] within 72 hours before your procedure. - You MUST have a responsible adult to drive you, who MUST remain at our facility the ENTIRE time. If not the procedure will be cancelled. You may go by taxi ONLY if you have a responsible adult with you. You may experience light headedness, dizziness etc., therefore you should have a responsible adult remain with you until the morning after your procedure. - Bring your insurance card and 's license. Call your insurance carrier to verify your benefits, and confirm that our facility is in your network, prior to the procedure date to ensure coverage. The facility name is listed as [facility] and the tax ID# is [id number].  - Due to the safety and privacy of our patients, only one visitor is allowed in the recovery area after the procedure. The center will not be responsible for lost valuables so please leave them at home. - Try to avoid seeds (strawberries, destiny, and rye) for one week prior to your procedure.   - If you have questions after beginning the prep, call [phone] Cyclopentyltrazolopyrimide (Ticagrelor or Brilinta), Cangrelor (Kailey Lick), Dabigatran (Pradaxa), Apixaban (Eliquis), Edoxaban (Savaysa)you should have received instructions regarding if and when to discontinue the medication. If you have not, or do not clearly understand the instructions, please call the office for clarification ([phone]). 5. Drink plenty of fluid. 1 day prior to your procedure:  1. Do not eat any SOLID FOOD, beginning with breakfast drink clear liquids only, which includes: Chicken or Beef Broth, Coffee/tea (without milk or creamer) Gatorade/PowerAde (no red or purple), JeIl-O (no red or purple), All Soda (even dark cola), Sorbet/Popsicles (no red or purple), Water. If you take Diabetic medications (insulin/oral medication)-reduce the amount by one half on the morning of prep. You may resume the meds once you begin eating again. You must drink 8oz of liquids every hour to avoid dehydration. 2. If you take Diabetic medications (insulin/oral medication) - reduce the amount by one half on morning of prep. You may resume the meds once you begin eating again. 3. Bowel Cleansing Prep  * Prepare your Colyte by adding water to the fill line. Powder flavoring, like crystal light (not red or purple), may be added for flavor. Chilling the Colyte solution in the refrigerator and/or drinking the solution through a straw may make it easier to drink. (do not add ice)  * 5:45 pm Take all 4 dulcolax tablets  *6:00pm Begin to drink the colyte/golyte solution. Drink one 8oz glass every 15 minutes until you have finished half of the solution (about 8 glasses, or 2 liters). It is ok to take short breaks in between glasses if you experience nausea or fullness. Restart when the symptoms improve. Continue to drink 8oz of liquids an hour until bedtime. Powder flavoring like crystal light may be added for flavor (no red or purple).   MORNING OF PROCEDURE

## 2018-07-12 NOTE — PATIENT INSTRUCTIONS
supplements). Acetaminophen (Tylenol®) is a nonprescription medication that can further injure the liver in people with cirrhosis. The exact amount of acetaminophen that is safe in cirrhosis is uncertain; some experts recommend that patients not use more than 650 mg per dose every 4 to 6 hours, with no more than 2000 mg per day. However, even low doses may not be safe for those who drink alcohol. Use of acetaminophen should be discussed with a healthcare provider. Screen for and treat varices - People with cirrhosis should undergo an upper endoscopy to determine if varices (expanded blood vessels) are present in the esophagus, the tube that connects the mouth and stomach (figure 2). Screening for and treatment of varices is discussed in detail in a separate topic review. (See \"Patient information: Screening for esophageal varices\" and \"Prediction of variceal hemorrhage in patients with cirrhosis\" and \"Primary prophylaxis against variceal hemorrhage in patients with cirrhosis\".)    Vaccination - People with cirrhosis should be vaccinated against hepatitis A and B. Pneumococcal vaccine and yearly influenza vaccine are also recommended. (See \"Patient information: Adult vaccines\" and \"Patient information: Influenza prevention\" and \"Patient information: Pneumonia prevention\". )    Treat ascites and edema - Ascites and edema can lead to complications, particularly infection. Ascites can also cause a person to feel short of breath or full. Thus, treatment is usually recommended to reduce the amount of fluid that collects in the lower legs and abdomen. Treatment usually involves taking one or more diuretic pills (fluid pills) and following a low-sodium (salt) diet. (See 'Dietary advice' below and \"Initial therapy of ascites in patients with cirrhosis\". )    Paracentesis - Some people do not get adequate relief from edema and ascites with fluid pills alone.  In this case, periodic drainage of the fluid (paracentesis) may be required. This is done by inserting a needle into the abdomen and withdrawing a large amount of fluid from the space around the abdominal organs. The procedure can usually be performed in a doctor's office. Following paracentesis, it is important to continue taking your diuretic medication and to limit the amount of sodium you consume (see 'Dietary advice' below). TIPS - A TIPS (transjugular intrahepatic portosystemic shunts) procedure may be recommended to treat ascites if diuretics, paracentesis, and changes in diet are not completely successful in relieving ascites. (See \"Indications and contraindications to the use of transjugular intrahepatic portosystemic shunts\".)    During the procedure, a radiologist places a device within the liver to reduce the blood pressure in the portal vein (in the liver) (figure 2). The procedure is usually performed with local anesthesia and sedation, and takes between one and three hours. Most patients remain in the hospital for one to three nights after the procedure. Screen for hepatocellular carcinoma - People with cirrhosis should have tests to detect hepatocellular carcinoma (cancer of the liver). Testing usually requires an ultrasound examination of the liver every six months. Consider liver transplantation - Not all patients with cirrhosis will require a liver transplantation, and many are not eligible for one. However, because the waiting list for liver transplantation is lengthy (up to two years in some regions), it is important to know if liver transplantation is a reasonable option while you are still relatively healthy. (See 'Liver transplantation for cirrhosis' below.)    Screen for encephalopathy - People with cirrhosis can develop confusion, which is sometimes subtle. Detecting confusion is important since treatment is available, and the confusion itself can lead to serious problems (eg, an automobile accident).  A change in the sleep pattern (insomnia

## 2018-07-13 LAB
F-ACTIN AB IGG: 6 UNITS (ref 0–19)
MITOCHONDRIAL M2 AB, IGG: 39 UNITS (ref 0–20)

## 2018-07-14 LAB
AFP: 5.1 UG/L
ANA BY IFA: ABNORMAL
CERULOPLASMIN: 37 MG/DL (ref 15–30)

## 2018-07-18 ENCOUNTER — HOSPITAL ENCOUNTER (OUTPATIENT)
Dept: ULTRASOUND IMAGING | Age: 63
Discharge: HOME | End: 2018-07-18
Payer: MEDICAID

## 2018-07-18 DIAGNOSIS — K70.30 ALCOHOLIC CIRRHOSIS OF LIVER WITHOUT ASCITES (HCC): ICD-10-CM

## 2018-07-18 PROCEDURE — 76705 ECHO EXAM OF ABDOMEN: CPT

## 2018-07-20 ENCOUNTER — TELEPHONE (OUTPATIENT)
Dept: GASTROENTEROLOGY | Age: 63
End: 2018-07-20

## 2018-07-20 DIAGNOSIS — R89.9 ELEVATED LABORATORY TEST RESULT: ICD-10-CM

## 2018-07-20 DIAGNOSIS — K74.60 CIRRHOSIS OF LIVER WITHOUT ASCITES, UNSPECIFIED HEPATIC CIRRHOSIS TYPE (HCC): Primary | ICD-10-CM

## 2018-07-22 ENCOUNTER — ANESTHESIA EVENT (OUTPATIENT)
Dept: ENDOSCOPY | Age: 63
End: 2018-07-22
Payer: MEDICAID

## 2018-07-23 ENCOUNTER — HOSPITAL ENCOUNTER (OUTPATIENT)
Age: 63
Setting detail: OUTPATIENT SURGERY
Discharge: HOME | End: 2018-07-23
Attending: INTERNAL MEDICINE | Admitting: INTERNAL MEDICINE
Payer: MEDICAID

## 2018-07-23 ENCOUNTER — ANESTHESIA (OUTPATIENT)
Dept: ENDOSCOPY | Age: 63
End: 2018-07-23
Payer: MEDICAID

## 2018-07-23 VITALS
HEART RATE: 95 BPM | SYSTOLIC BLOOD PRESSURE: 165 MMHG | BODY MASS INDEX: 34.53 KG/M2 | HEIGHT: 67 IN | DIASTOLIC BLOOD PRESSURE: 96 MMHG | OXYGEN SATURATION: 95 % | TEMPERATURE: 97.3 F | RESPIRATION RATE: 18 BRPM | WEIGHT: 220 LBS

## 2018-07-23 VITALS — OXYGEN SATURATION: 99 % | RESPIRATION RATE: 13 BRPM

## 2018-07-23 DIAGNOSIS — E11.10 DIABETIC KETOACIDOSIS WITHOUT COMA ASSOCIATED WITH TYPE 2 DIABETES MELLITUS (HCC): ICD-10-CM

## 2018-07-23 DIAGNOSIS — R89.9 ELEVATED LABORATORY TEST RESULT: Primary | ICD-10-CM

## 2018-07-23 DIAGNOSIS — K70.30 ALCOHOLIC CIRRHOSIS OF LIVER WITHOUT ASCITES (HCC): ICD-10-CM

## 2018-07-23 LAB
GLUCOSE BLD-MCNC: 103 MG/DL (ref 70–99)
GLUCOSE BLD-MCNC: 130 MG/DL (ref 70–99)
PERFORMED ON: ABNORMAL
PERFORMED ON: ABNORMAL

## 2018-07-23 PROCEDURE — 7100000011 HC PHASE II RECOVERY - ADDTL 15 MIN: Performed by: INTERNAL MEDICINE

## 2018-07-23 PROCEDURE — 3700000001 HC ADD 15 MINUTES (ANESTHESIA): Performed by: INTERNAL MEDICINE

## 2018-07-23 PROCEDURE — 3609010300 HC COLONOSCOPY W/BIOPSY SINGLE/MULTIPLE: Performed by: INTERNAL MEDICINE

## 2018-07-23 PROCEDURE — 3609012400 HC EGD TRANSORAL BIOPSY SINGLE/MULTIPLE: Performed by: INTERNAL MEDICINE

## 2018-07-23 PROCEDURE — C1773 RET DEV, INSERTABLE: HCPCS | Performed by: INTERNAL MEDICINE

## 2018-07-23 PROCEDURE — 7100000010 HC PHASE II RECOVERY - FIRST 15 MIN: Performed by: INTERNAL MEDICINE

## 2018-07-23 PROCEDURE — 3700000000 HC ANESTHESIA ATTENDED CARE: Performed by: INTERNAL MEDICINE

## 2018-07-23 PROCEDURE — 3609010600 HC COLONOSCOPY POLYPECTOMY SNARE/COLD BIOPSY: Performed by: INTERNAL MEDICINE

## 2018-07-23 PROCEDURE — 43239 EGD BIOPSY SINGLE/MULTIPLE: CPT | Performed by: INTERNAL MEDICINE

## 2018-07-23 PROCEDURE — 6360000002 HC RX W HCPCS: Performed by: ANESTHESIOLOGY

## 2018-07-23 PROCEDURE — 2580000003 HC RX 258: Performed by: INTERNAL MEDICINE

## 2018-07-23 PROCEDURE — 2709999900 HC NON-CHARGEABLE SUPPLY: Performed by: INTERNAL MEDICINE

## 2018-07-23 PROCEDURE — 88305 TISSUE EXAM BY PATHOLOGIST: CPT

## 2018-07-23 PROCEDURE — 45385 COLONOSCOPY W/LESION REMOVAL: CPT | Performed by: INTERNAL MEDICINE

## 2018-07-23 RX ORDER — SODIUM CHLORIDE 9 MG/ML
INJECTION, SOLUTION INTRAVENOUS CONTINUOUS
Status: DISCONTINUED | OUTPATIENT
Start: 2018-07-23 | End: 2018-07-23 | Stop reason: HOSPADM

## 2018-07-23 RX ORDER — MIDAZOLAM HYDROCHLORIDE 5 MG/ML
2 INJECTION INTRAMUSCULAR; INTRAVENOUS ONCE
Status: COMPLETED | OUTPATIENT
Start: 2018-07-23 | End: 2018-07-23

## 2018-07-23 RX ORDER — INSULIN GLARGINE 100 [IU]/ML
INJECTION, SOLUTION SUBCUTANEOUS
Qty: 15 ML | Refills: 0 | Status: SHIPPED | OUTPATIENT
Start: 2018-07-23 | End: 2018-09-24 | Stop reason: SDUPTHER

## 2018-07-23 RX ORDER — OMEPRAZOLE 20 MG/1
20 TABLET, DELAYED RELEASE ORAL DAILY
Qty: 30 TABLET | Refills: 1 | Status: SHIPPED | OUTPATIENT
Start: 2018-07-23 | End: 2018-08-29 | Stop reason: SDUPTHER

## 2018-07-23 RX ADMIN — SODIUM CHLORIDE: 9 INJECTION, SOLUTION INTRAVENOUS at 09:00

## 2018-07-23 RX ADMIN — MIDAZOLAM HYDROCHLORIDE 2 MG: 5 INJECTION, SOLUTION INTRAMUSCULAR; INTRAVENOUS at 10:56

## 2018-07-23 ASSESSMENT — PAIN - FUNCTIONAL ASSESSMENT: PAIN_FUNCTIONAL_ASSESSMENT: 0-10

## 2018-07-23 NOTE — H&P
[address] ,  [address] [facility] hospital  Phone: [phone]     CHIEF COMPLAINT           Chief Complaint   Patient presents with   [facility] Establish Care       NP- rectal bleeding, abd pain, brother- rectal ca         HPI      Thank you Samira Flood MD for asking me to see Mayte Bill in consultation. He is a  [4] White [1] 65 y.o. . male seen independently who presents with the following GI complaints:  . Mayte Bill  Complains of rectal bleeding and pain present 1 week, severe worse with constipation which has been present for at least the past year shortly after being started on narcotics unrelieved with miralax and side effects with lactulose, no other associated signs/symptoms. Brother just diagnosed with rectal cancer in his 46s. Has a significant drinking history and still drinks 2-3 beers/d despite CT in February showing cirrhosis. Denies being told he has cirrhosis.    Mary Lou Orozco was seen today for establish care.     Diagnoses and all orders for this visit:     Constipation, unspecified constipation type     Rectal bleeding  -     bisacodyl (DULCOLAX) 5 MG EC tablet; Take 1 tablet by mouth daily as needed for Constipation  -     polyethylene glycol-electrolytes (COLYTE) 240 g SOLR solution; Take 4,000 mLs by mouth once for 1 dose  -     magnesium citrate solution; Take 296 mLs by mouth once for 1 dose  -     COLONOSCOPY W/ OR W/O BIOPSY; Future     Family history of rectal cancer  -     COLONOSCOPY W/ OR W/O BIOPSY; Future     Alcoholic cirrhosis of liver without ascites ([facility] [address]  -     Protime-INR; Future  -     TSH with Reflex; Future  -     CBC WITH AUTO DIFFERENTIAL; Future  -     Comprehensive Metabolic Panel; Future  -     AFP TUMOR MARKER; Future  -     US Gallbladder Ruq; Future  -     Yanci Titer IgG by IFA Reflex; Future  -     Ceruloplasmin;  Future  -     F-actin (smooth muscle) antibody w/ reflex to titer; Future  -     Mitochondrial antibodies, M2; Future  -     Hepatitis Panel, Acute; Future  -     Iron and TIBC; Future  -     Ferritin; Future  -     EGD; Future        HPI elements: location, severity, timing, modifying factors, quality, duration, context and associated signs/symptoms.     Last Encounter Reviewed:  Pertinent PMH, FH, SH is reviewed below.   Last EGD: none  Last Colonoscopy: none     No components found for: HGBA1C      BP Readings from Last 3 Encounters:   07/12/18 138/70   06/25/18 (!) 144/78   03/23/18 130/72           Health Maintenance   Topic Date Due    Shingles Vaccine (1 of 2 - 2 Dose Series) 10/14/2006    Low dose CT lung screening  05/06/2012    DTaP/Tdap/Td vaccine (1 - Tdap) 09/17/2018 (Originally 11/4/2014)    Diabetic foot exam  06/21/2019 (Originally 10/14/1966)    Pneumococcal med risk (1 of 1 - PPSV23) 09/16/2030 (Originally 10/14/1975)    Diabetic retinal exam  02/15/2019    Colon Cancer Screen FIT/FOBT  02/18/2019    Diabetic microalbuminuria test  03/22/2019    Lipid screen  03/22/2019    A1C test (Diabetic or Prediabetic)  06/25/2019    Potassium monitoring  07/12/2019    Creatinine monitoring  07/12/2019    Hepatitis C screen  Completed    HIV screen  Completed         No components found for: SURGICALPATH      PAST MEDICAL HISTORY      Past Medical History        Past Medical History:   Diagnosis Date    Bronchitis chronic      Chest pain      COPD (chronic obstructive pulmonary disease) ([facility] [address]      COPD (chronic obstructive pulmonary disease) (HCC)      Gout      Hilar adenopathy      Hyperlipidemia      Hypertension      Knee pain, right      Numbness and tingling of leg      Osteoarthritis      Paresthesia of bilateral legs      Seizures (HCC)       ongoing, began after swine flu vaccination    Substance abuse           FAMILY HISTORY      Family History         Family History   Problem Relation Age of Onset    Cancer Mother           Lung    Emphysema Father      Diabetes Sister      Hypertension Sister      Cancer Brother           throat    Asthma Neg Hx           SOCIAL HISTORY      Social History   Social History            Social History    Marital status:        Spouse name: N/A    Number of children: N/A    Years of education: N/A            Occupational History    [employer]         not working currently             Social History Main Topics    Smoking status: Current Every Day Smoker       Packs/day: 2.00       Years: 35.00       Types: Cigarettes, Cigars    Smokeless tobacco: Former User       Types: Snuff         Comment: 2 ppd for more than 30 years, cutting down    Alcohol use No         Comment: last 1 week ago    Drug use: No    Sexual activity: Yes       Partners: Female           Other Topics Concern    Not on file          Social History Narrative    No narrative on file         SURGICAL HISTORY      Past Surgical History         Past Surgical History:   Procedure Laterality Date    BRONCHOSCOPY   2/7/2011     bronch with EBUS    CYSTOSCOPY   2/4/15     Urethral Dilatation, Resection of Bladder Tumor    CYSTOSCOPY   2/24/2016     fulgeration of bladder tumor    ELBOW SURGERY Left      OTHER SURGICAL HISTORY   05-     cystoscopy, bladder biopsy    OTHER SURGICAL HISTORY   09/09/2015     cystoscopy, urethral dilatation, bladder tumor follow up         CURRENT MEDICATIONS   (This list may include medications prescribed during this encounter as epic can not insert only the list prior to this encounter.)  Current Outpatient Rx          Current Outpatient Rx   Medication Sig Dispense Refill    bisacodyl (DULCOLAX) 5 MG EC tablet Take 1 tablet by mouth daily as needed for Constipation 4 tablet 0    magnesium citrate solution Take 296 mLs by mouth once for 1 dose 296 mL 0    diltiazem (CARDIZEM CD) 240 MG extended release capsule TAKE ONE (1) capsule BY MOUTH DAILY 30 capsule 0    gabapentin (NEURONTIN) Status:   Future       Number of Occurrences:   1       Standing Expiration Date:   7/12/2019    AFP TUMOR MARKER       Standing Status:   Future       Number of Occurrences:   1       Standing Expiration Date:   7/12/2019    Yanci Titer IgG by IFA Reflex       Standing Status:   Future       Number of Occurrences:   1       Standing Expiration Date:   7/12/2019    Ceruloplasmin       Standing Status:   Future       Number of Occurrences:   1       Standing Expiration Date:   7/12/2019    F-actin (smooth muscle) antibody w/ reflex to titer       Standing Status:   Future       Number of Occurrences:   1       Standing Expiration Date:   7/12/2019    Mitochondrial antibodies, M2       Standing Status:   Future       Number of Occurrences:   1       Standing Expiration Date:   7/12/2019    Hepatitis Panel, Acute       Standing Status:   Future       Number of Occurrences:   1       Standing Expiration Date:   7/12/2019    Iron and TIBC       Standing Status:   Future       Number of Occurrences:   1       Standing Expiration Date:   7/12/2019       Order Specific Question:   Is Patient Fasting?       Answer:   no       Order Specific Question:   No of Hours?       Answer:   >12    Ferritin       Standing Status:   Future       Number of Occurrences:   1       Standing Expiration Date:   7/12/2019    EGD       Standing Status:   Future       Standing Expiration Date:   8/12/2018       Order Specific Question:   Screening or Diagnostic?       Answer:   Diagnostic      Oziel Dillon was seen today for establish care.     Diagnoses and all orders for this visit:     Constipation, unspecified constipation type     Rectal bleeding  -     bisacodyl (DULCOLAX) 5 MG EC tablet; Take 1 tablet by mouth daily as needed for Constipation  -     polyethylene glycol-electrolytes (COLYTE) 240 g SOLR solution; Take 4,000 mLs by mouth once for 1 dose  -     magnesium citrate solution;  Take 296 mLs by mouth once for 1 dose  - COLONOSCOPY W/ OR W/O BIOPSY; Future     Family history of rectal cancer  -     COLONOSCOPY W/ OR W/O BIOPSY; Future     Alcoholic cirrhosis of liver without ascites ([facility] [address]  -     Protime-INR; Future  -     TSH with Reflex; Future  -     CBC WITH AUTO DIFFERENTIAL; Future  -     Comprehensive Metabolic Panel; Future  -     AFP TUMOR MARKER; Future  -     US Gallbladder Ruq; Future  -     Yanci Titer IgG by IFA Reflex; Future  -     Ceruloplasmin; Future  -     F-actin (smooth muscle) antibody w/ reflex to titer; Future  -     Mitochondrial antibodies, M2; Future  -     Hepatitis Panel, Acute; Future  -     Iron and TIBC; Future  -     Ferritin; Future  -     EGD; Future        Discussed the need for routine follow up for cirrhosis at a minimum of every 6 months to monitor for risk factor such as ascites, bleeding, hepatoma, encephalopathy including need for routing labs, imaging and endoscopy.       ORDERED FUTURE/PENDING TESTS      Lab Frequency Next Occurrence   MRI Tibia Fibula Right W Contrast Once 10/13/2017   FULL PFT STUDY WITH PRE AND POST Once 04/18/2018   External Referral To Podiatry Once 03/21/2018   MRI Knee Right WO Contrast Once 03/20/2018   CT Lung Screening Once 10/31/2018   US Gallbladder Ruq Once 07/12/2018   EGD Once 07/12/2018   COLONOSCOPY W/ OR W/O BIOPSY Once 07/26/2018         FOLLOWUP   Return in about 6 months (around 1/12/2019) for EGD & Colonoscopy.       SUSANNE GARCIA 7/23/18 9:22 AM

## 2018-07-23 NOTE — OP NOTE
after completing antibiotics and when off ppi for 2 weeks.  -Await pathology. , -Repeat colonoscopy in 3 years.  -diverticulosis education  -miralax daily for constipation 1-3 doses  -liver biopsy and follow up in the office after  -alcohol abstinence        Magdalena [address] 7/23/18 10:22 AM

## 2018-07-23 NOTE — ANESTHESIA POSTPROCEDURE EVALUATION
%  07/23/18 1117, BP:(!) 165/102, Pulse:99, Resp:16, SpO2:96 %  07/23/18 1113, BP:(!) 157/94, Pulse:100, Resp:16, SpO2:96 %  07/23/18 1107, BP:(!) 167/96, Pulse:100, Resp:16, SpO2:94 %  07/23/18 1101, BP:(!) 184/105, Pulse:105, Resp:16, SpO2:93 %  07/23/18 1046, BP:(!) 174/139, Pulse:105, Resp:16, SpO2:98 %  07/23/18 1041, BP:(!) 180/105, Pulse:107, Resp:16, SpO2:97 %  07/23/18 1036, BP:(!) 176/101, Pulse:109, Resp:16, SpO2:97 %  07/23/18 1031, BP:(!) 169/88, Pulse:110, Resp:16, SpO2:97 %  07/23/18 1026, BP:(!) 155/82, Pulse:111, Resp:14, SpO2:98 %  07/23/18 1022, BP:132/75, Temp:97.3 °F (36.3 °C), Temp src:Temporal, Pulse:113, Resp:14, SpO2:99 %  07/23/18 0838, BP:(!) 176/93, Temp:97.1 °F (36.2 °C), Pulse:96, Resp:18, SpO2:96 %, Height:5' 7\" (1.702 m), Weight:220 lb (99.8 kg)     LABS:    CBC  Lab Results       Component                Value               Date/Time                  WBC                      10.9                07/12/2018 10:45 AM        HGB                      13.1 (L)            07/12/2018 10:45 AM        HCT                      39.0 (L)            07/12/2018 10:45 AM        PLT                      357                 07/12/2018 10:45 AM   RENAL  Lab Results       Component                Value               Date/Time                  NA                       138                 07/12/2018 10:45 AM        K                        4.1                 07/12/2018 10:45 AM        K                        4.2                 02/21/2018 06:24 AM        CL                       97 (L)              07/12/2018 10:45 AM        CO2                      27                  07/12/2018 10:45 AM        BUN                      7                   07/12/2018 10:45 AM        CREATININE               0.8                 07/12/2018 10:45 AM        GLUCOSE                  104 (H)             07/12/2018 10:45 AM   COAGS  Lab Results       Component                Value               Date/Time

## 2018-07-23 NOTE — ANESTHESIA PRE PROCEDURE
Department of Anesthesiology  Preprocedure Note       Name:  Jose R Livingston   Age:  65 y.o.  :  1955                                          MRN:  [id number]         Date:  2018      Surgeon: Robson Ceja):  Hue Hines MD    Procedure: Procedure(s):  COLON NF Baldemar Aquas. EGD NF W/ANES. Medications prior to admission:   Prior to Admission medications    Medication Sig Start Date End Date Taking?  Authorizing Provider   QVAR 80 MCG/ACT inhaler USE 1 PUFF 2 TIMES DAILY 18   Domingo Xiong MD   PROAIR  (46 Base) MCG/ACT inhaler INHALE 2 PUFFS EVERY 6 HOURS AS NEEDED FOR WHEEZING 18   Domingo Xiong MD   bisacodyl (DULCOLAX) 5 MG EC tablet Take 1 tablet by mouth daily as needed for Constipation 18   Hue Hines MD   magnesium citrate solution Take 296 mLs by mouth once for 1 dose 18  Hue Hines MD   diltiazem (CARDIZEM CD) 240 MG extended release capsule TAKE ONE (1) capsule BY MOUTH DAILY 18   Domingo Xiong MD   gabapentin (NEURONTIN) 300 MG capsule TAKE three (3) capsules BY MOUTH THREE TIMES DAILY 7/6/18 10/4/18  Domingo Xiong MD   ranitidine (ZANTAC) 300 MG tablet TAKE 1 TABLET AT BEDTIME 18   Domingo Xiong MD   spironolactone (ALDACTONE) 50 MG tablet TAKE 1 TABLET BY MOUTH DAILY 18   Domingo Xiong MD   metFORMIN (GLUCOPHAGE) 500 MG tablet TAKE ONE TABLET BY MOUTH 2 TIMES DAILY WITH MEALS 18   Domingo Xiong MD   vitamin B-1 (THIAMINE) 100 MG tablet TAKE 1 TABLET DAILY 18   Domingo Xiong MD   TRUE METRIX BLOOD GLUCOSE TEST strip TEST 2 TIMES DAILY 18   NAYELY Hdz CNP   atorvastatin (LIPITOR) 40 MG tablet TAKE ONE (1) tablet BY MOUTH DAILY 18   NAYELY Hdz CNP   allopurinol (ZYLOPRIM) 300 MG tablet TAKE ONE (1) tablet BY MOUTH DAILY 18   NAYELY Hdz CNP fluticasone (FLONASE) 50 MCG/ACT nasal spray USE 1 SPRAY IN [facility] NOSTRIL DAILY 7/2/18   Belle Perez MD   MUCINEX 600 MG extended release tablet TAKE 1 TABLET BY MOUTH 2 TIMES DAILY 6/25/18   Belle Perez MD   [facility] KWIKPEN 100 UNIT/ML injection pen INJECT 25 UNITS INTO THE SKIN DAILY WITH SUPPER 6/14/18   NAYELY Estrada CNP   lactulose (CHRONULAC) 10 GM/15ML solution TAKE 30 ML BY MOUTH 2 TIMES DAILY 6/4/18   NAYELY Estrada CNP   TRUEPLUS LANCETS 30G MISC TEST 2 TIMES DAILY 5/10/18   NAYELY Estrada CNP   Alcohol Swabs (ALCOHOL PREP) 70 % PADS USE AS DIRECTED 4/2/18   NAYELY sEtrada CNP   UNIFINE PENTIPS 31G X 8 MM MISC USE ONCE DAILY WITH INJECTION 4/2/18   NAYELY Estrada CNP   clobetasol (TEMOVATE) 0.05 % ointment  3/22/18   Historical Provider, MD   QVAR REDIHALER 80 MCG/ACT AERB inhaler 2 puffs 2 times daily  3/16/18   Historical Provider, MD   ipratropium-albuterol (DUONEB) 0.5-2.5 (3) MG/3ML SOLN nebulizer solution Inhale 1 vial into the lungs 4 times daily    Historical Provider, MD   ketoconazole (NIZORAL) 2 % cream Apply BID 2/23/18   Belle Perez MD   nystatin (MYCOSTATIN) 938808 UNIT/ML suspension Take 5 mLs by mouth 4 times daily 1/18/18   NAYELY Estrada CNP   Umeclidinium Bromide (INCRUSE ELLIPTA) 62.5 MCG/INH AEPB 1 inhalation daily 10/18/17   Belle Perez MD   oxyCODONE (ROXICODONE) 5 MG immediate release tablet  8/10/17   Historical Provider, MD   folic acid (FOLVITE) 1 MG tablet Take 1 tablet by mouth daily.  7/7/17   Belle Perez MD   finasteride (PROSCAR) 5 MG tablet  2/7/17   Historical Provider, MD   ondansetron (ZOFRAN ODT) 4 MG disintegrating tablet Take 1 tablet by mouth every 8 hours as needed for Nausea or Vomiting 2/11/17   Vida Ramos MD   Nebulizers (COMPRESSOR/NEBULIZER) MISC Use qid with albuterol qid and pulmicort bid 1/20/17 SURGICAL HISTORY  05-    cystoscopy, bladder biopsy    OTHER SURGICAL HISTORY  09/09/2015    cystoscopy, urethral dilatation, bladder tumor follow up       Social History:    Social History   Substance Use Topics    Smoking status: Current Every Day Smoker     Packs/day: 2.00     Years: 35.00     Types: Cigarettes, Cigars    Smokeless tobacco: Former User     Types: Snuff      Comment: 2 ppd for more than 30 years, cutting down    Alcohol use No      Comment: last 1 week ago                                Ready to quit: Not Answered  Counseling given: Not Answered      Vital Signs (Current): There were no vitals filed for this visit. BP Readings from Last 3 Encounters:   07/12/18 138/70   06/25/18 (!) 144/78   03/23/18 130/72       NPO Status:                                                                                 BMI:   Wt Readings from Last 3 Encounters:   07/12/18 220 lb (99.8 kg)   06/25/18 201 lb 9.6 oz (91.4 kg)   04/06/18 202 lb 13.2 oz (92 kg)     There is no height or weight on file to calculate BMI.    CBC:   Lab Results   Component Value Date    WBC 10.9 07/12/2018    RBC 4.72 07/12/2018    HGB 13.1 07/12/2018    HCT 39.0 07/12/2018    MCV 82.6 07/12/2018    RDW 18.1 07/12/2018     07/12/2018       CMP:   Lab Results   Component Value Date     07/12/2018    K 4.1 07/12/2018    K 4.2 02/21/2018    CL 97 07/12/2018    CO2 27 07/12/2018    BUN 7 07/12/2018    CREATININE 0.8 07/12/2018    GFRAA >60 07/12/2018    GFRAA >60 04/02/2013    AGRATIO 1.1 07/12/2018    LABGLOM >60 07/12/2018    GLUCOSE 104 07/12/2018    PROT 8.3 07/12/2018    PROT 9.1 01/12/2011    CALCIUM 9.9 07/12/2018    BILITOT <0.2 07/12/2018    ALKPHOS 164 07/12/2018    AST 25 07/12/2018    ALT 23 07/12/2018       POC Tests: No results for input(s): POCGLU, POCNA, POCK, POCCL, POCBUN, POCHEMO, POCHCT in the last 72 hours.     Coags:   Lab Results   Component Value Date

## 2018-07-26 RX ORDER — PEN NEEDLE, DIABETIC 31 GX5/16"
NEEDLE, DISPOSABLE MISCELLANEOUS
Qty: 100 EACH | Refills: 0 | Status: SHIPPED | OUTPATIENT
Start: 2018-07-26 | End: 2019-01-10 | Stop reason: SDUPTHER

## 2018-07-26 RX ORDER — SPIRONOLACTONE 50 MG/1
TABLET, FILM COATED ORAL
Qty: 30 TABLET | Refills: 1 | Status: SHIPPED | OUTPATIENT
Start: 2018-07-26 | End: 2018-10-09 | Stop reason: SDUPTHER

## 2018-07-26 RX ORDER — ALLOPURINOL 300 MG/1
TABLET ORAL
Qty: 30 TABLET | Refills: 2 | Status: SHIPPED | OUTPATIENT
Start: 2018-07-26 | End: 2018-10-22 | Stop reason: SDUPTHER

## 2018-07-26 RX ORDER — DILTIAZEM HYDROCHLORIDE 240 MG/1
CAPSULE, COATED, EXTENDED RELEASE ORAL
Qty: 30 CAPSULE | Refills: 1 | Status: SHIPPED | OUTPATIENT
Start: 2018-07-26 | End: 2018-10-09 | Stop reason: SDUPTHER

## 2018-07-26 RX ORDER — CALCIUM CITRATE/VITAMIN D3 200MG-6.25
TABLET ORAL
Qty: 50 EACH | Refills: 1 | Status: SHIPPED | OUTPATIENT
Start: 2018-07-26 | End: 2018-10-09 | Stop reason: SDUPTHER

## 2018-07-26 RX ORDER — ATORVASTATIN CALCIUM 40 MG/1
TABLET, FILM COATED ORAL
Qty: 30 TABLET | Refills: 2 | Status: SHIPPED | OUTPATIENT
Start: 2018-07-26 | End: 2018-08-02

## 2018-07-26 RX ORDER — BLOOD PRESSURE TEST KIT
KIT MISCELLANEOUS
Qty: 100 EACH | Refills: 0 | Status: SHIPPED | OUTPATIENT
Start: 2018-07-26 | End: 2018-08-16 | Stop reason: SDUPTHER

## 2018-07-26 RX ORDER — THIAMINE MONONITRATE (VIT B1) 100 MG
TABLET ORAL
Qty: 30 TABLET | Refills: 1 | Status: SHIPPED | OUTPATIENT
Start: 2018-07-26 | End: 2018-10-22 | Stop reason: SDUPTHER

## 2018-07-26 RX ORDER — RANITIDINE 300 MG/1
TABLET ORAL
Qty: 30 TABLET | Refills: 1 | Status: SHIPPED | OUTPATIENT
Start: 2018-07-26 | End: 2018-08-28 | Stop reason: ALTCHOICE

## 2018-07-26 NOTE — PROGRESS NOTES
Please call patient with results. See procedure note for additional recommendations. h pylori negative. Follow up colonoscopy in 3 years. This is the recommended follow up interval for 2 or more adenomas, 1 or more tubulovillous adenomas,  an adenoma over 1cm, a serrated adenoma > 1cm, screening with a diagnosis of ulcerative/crohn's colitis before the age of 36 and 8-12 years of diagnosis, or second exam following a history of colon cancer. First degree relatives of those with an adenoma before the age of 61 should start screening colonoscopy 10 years sooner then the diagnosis or 36 which ever comes sooner.

## 2018-07-27 ENCOUNTER — TELEPHONE (OUTPATIENT)
Dept: GASTROENTEROLOGY | Age: 63
End: 2018-07-27

## 2018-07-27 NOTE — TELEPHONE ENCOUNTER
LM on  to call back Liver biopsy scheduled at [facility] on 8/2 arrive at 9:00 AM NPO midnight, needs a , can take 4-6 hours

## 2018-08-02 ENCOUNTER — HOSPITAL ENCOUNTER (OUTPATIENT)
Dept: ULTRASOUND IMAGING | Age: 63
Discharge: HOME | End: 2018-08-02
Payer: MEDICAID

## 2018-08-02 ENCOUNTER — HOSPITAL ENCOUNTER (OUTPATIENT)
Dept: GENERAL RADIOLOGY | Age: 63
Discharge: HOME | End: 2018-08-02
Payer: MEDICAID

## 2018-08-02 VITALS
DIASTOLIC BLOOD PRESSURE: 78 MMHG | OXYGEN SATURATION: 93 % | HEIGHT: 67 IN | BODY MASS INDEX: 34.53 KG/M2 | HEART RATE: 90 BPM | SYSTOLIC BLOOD PRESSURE: 168 MMHG | RESPIRATION RATE: 18 BRPM | WEIGHT: 220 LBS

## 2018-08-02 DIAGNOSIS — R89.9 ELEVATED LABORATORY TEST RESULT: ICD-10-CM

## 2018-08-02 DIAGNOSIS — K76.0 FATTY LIVER: ICD-10-CM

## 2018-08-02 DIAGNOSIS — K74.60 CIRRHOSIS OF LIVER WITHOUT ASCITES, UNSPECIFIED HEPATIC CIRRHOSIS TYPE (HCC): ICD-10-CM

## 2018-08-02 LAB
INR BLD: 1.1 (ref 0.86–1.14)
PLATELET # BLD: 395 K/UL (ref 135–450)
PROTHROMBIN TIME: 12.5 SEC (ref 9.8–13)

## 2018-08-02 PROCEDURE — 88307 TISSUE EXAM BY PATHOLOGIST: CPT

## 2018-08-02 PROCEDURE — 85610 PROTHROMBIN TIME: CPT

## 2018-08-02 PROCEDURE — 88313 SPECIAL STAINS GROUP 2: CPT

## 2018-08-02 PROCEDURE — 47000 NEEDLE BIOPSY OF LIVER PERQ: CPT

## 2018-08-02 PROCEDURE — 6360000002 HC RX W HCPCS: Performed by: RADIOLOGY

## 2018-08-02 PROCEDURE — 85049 AUTOMATED PLATELET COUNT: CPT

## 2018-08-02 PROCEDURE — 36415 COLL VENOUS BLD VENIPUNCTURE: CPT

## 2018-08-02 RX ORDER — FENTANYL CITRATE 50 UG/ML
INJECTION, SOLUTION INTRAMUSCULAR; INTRAVENOUS
Status: COMPLETED | OUTPATIENT
Start: 2018-08-02 | End: 2018-08-02

## 2018-08-02 RX ORDER — MIDAZOLAM HYDROCHLORIDE 5 MG/ML
INJECTION INTRAMUSCULAR; INTRAVENOUS
Status: COMPLETED | OUTPATIENT
Start: 2018-08-02 | End: 2018-08-02

## 2018-08-02 RX ADMIN — MIDAZOLAM HYDROCHLORIDE 1 MG: 5 INJECTION, SOLUTION INTRAMUSCULAR; INTRAVENOUS at 13:09

## 2018-08-02 RX ADMIN — FENTANYL CITRATE 50 MCG: 50 INJECTION INTRAMUSCULAR; INTRAVENOUS at 13:08

## 2018-08-14 ENCOUNTER — TELEPHONE (OUTPATIENT)
Dept: GASTROENTEROLOGY | Age: 63
End: 2018-08-14

## 2018-08-16 RX ORDER — BLOOD PRESSURE TEST KIT
KIT MISCELLANEOUS
Qty: 100 EACH | Refills: 0 | Status: SHIPPED | OUTPATIENT
Start: 2018-08-16 | End: 2018-10-09 | Stop reason: SDUPTHER

## 2018-08-16 RX ORDER — GLUCOSAM/CHON-MSM1/C/MANG/BOSW 500-416.6
TABLET ORAL
Qty: 100 EACH | Refills: 0 | Status: SHIPPED | OUTPATIENT
Start: 2018-08-16 | End: 2018-10-09 | Stop reason: SDUPTHER

## 2018-08-28 ENCOUNTER — OFFICE VISIT (OUTPATIENT)
Dept: PULMONOLOGY | Age: 63
End: 2018-08-28

## 2018-08-28 VITALS
WEIGHT: 224.4 LBS | BODY MASS INDEX: 35.22 KG/M2 | SYSTOLIC BLOOD PRESSURE: 150 MMHG | TEMPERATURE: 98 F | HEIGHT: 67 IN | RESPIRATION RATE: 20 BRPM | OXYGEN SATURATION: 96 % | DIASTOLIC BLOOD PRESSURE: 78 MMHG | HEART RATE: 105 BPM

## 2018-08-28 DIAGNOSIS — J44.9 CHRONIC OBSTRUCTIVE PULMONARY DISEASE, UNSPECIFIED COPD TYPE (HCC): Primary | ICD-10-CM

## 2018-08-28 DIAGNOSIS — K74.69 OTHER CIRRHOSIS OF LIVER (HCC): ICD-10-CM

## 2018-08-28 DIAGNOSIS — J47.9 BRONCHIECTASIS WITHOUT COMPLICATION (HCC): ICD-10-CM

## 2018-08-28 PROCEDURE — G8926 SPIRO NO PERF OR DOC: HCPCS | Performed by: INTERNAL MEDICINE

## 2018-08-28 PROCEDURE — 3023F SPIROM DOC REV: CPT | Performed by: INTERNAL MEDICINE

## 2018-08-28 PROCEDURE — 4004F PT TOBACCO SCREEN RCVD TLK: CPT | Performed by: INTERNAL MEDICINE

## 2018-08-28 PROCEDURE — 3017F COLORECTAL CA SCREEN DOC REV: CPT | Performed by: INTERNAL MEDICINE

## 2018-08-28 PROCEDURE — G8417 CALC BMI ABV UP PARAM F/U: HCPCS | Performed by: INTERNAL MEDICINE

## 2018-08-28 PROCEDURE — G8427 DOCREV CUR MEDS BY ELIG CLIN: HCPCS | Performed by: INTERNAL MEDICINE

## 2018-08-28 PROCEDURE — 99214 OFFICE O/P EST MOD 30 MIN: CPT | Performed by: INTERNAL MEDICINE

## 2018-08-28 NOTE — PROGRESS NOTES
and dry. Psychiatric: Normal mood and affect. Neurologic: speech fluent, alert and oriented, strength symmetric     Data  CT SCAN ABD /PELVIS 2/18/18  FINDINGS:   Lower Chest: There is mild bibasilar cylindrical bronchiectasis. Fidel Estrin is no   lobar consolidation or pleural effusion.       Organs: The liver exhibits a nodular surface contour consistent with   cirrhosis.  The remainder of the solid abdominal organs are unremarkable.       GI/Bowel: There is no bowel dilatation, wall thickening or obstruction.  The   appendix is normal.       Pelvis: The bladder and prostate are within normal limits.       Peritoneum/Retroperitoneum: There is no free air, free fluid or   intraperitoneal inflammatory change.  There is no adenopathy.  Mild   atherosclerotic changes are present within the aortoiliac system.       Bones/Soft Tissues: There is no fracture or osseous destruction.           Impression   Cirrhosis. L Hilar TBNA 2-8-11 - no malignant cells; FLOW normal polyclonal B/T cells    PFT 2-10-11 FEV1 2L 62% % DLCO 100%    Assessment:  · Moderate COPD on last PFT in 2011, with recent acute exacerbation   · Severe dyspnea on exertion   · Hypercapnic respiratory failure at [facility]  · Obesity  · Lower lobe bronchiectasis on abdominal CT   · Cirrhosis    Plan:   · Incruse daily, did not tolerate Anoro  · PRN albuterol  · Low dose chest CT for lung cancer screening is recommended, declined for now.  He is considering (too much testing going on right now related to liver)

## 2018-08-29 DIAGNOSIS — R09.81 NASAL CONGESTION: ICD-10-CM

## 2018-08-30 RX ORDER — OMEPRAZOLE 20 MG/1
CAPSULE, DELAYED RELEASE ORAL
Qty: 30 CAPSULE | Refills: 0 | Status: SHIPPED | OUTPATIENT
Start: 2018-08-30 | End: 2018-10-09 | Stop reason: SDUPTHER

## 2018-08-30 RX ORDER — FLUTICASONE PROPIONATE 50 MCG
SPRAY, SUSPENSION (ML) NASAL
Qty: 16 G | Refills: 1 | Status: SHIPPED | OUTPATIENT
Start: 2018-08-30 | End: 2018-10-22 | Stop reason: SDUPTHER

## 2018-08-30 RX ORDER — UMECLIDINIUM 62.5 UG/1
AEROSOL, POWDER ORAL
Qty: 30 EACH | Refills: 1 | Status: SHIPPED | OUTPATIENT
Start: 2018-08-30 | End: 2018-10-22 | Stop reason: SDUPTHER

## 2018-09-19 RX ORDER — FOLIC ACID 1 MG/1
TABLET ORAL
Qty: 30 TABLET | Refills: 5 | Status: SHIPPED | OUTPATIENT
Start: 2018-09-19 | End: 2019-02-08 | Stop reason: SDUPTHER

## 2018-09-19 NOTE — TELEPHONE ENCOUNTER
Refill request for folic acid medication.      Name of [facility] visit - 6/25/18     Pending visit - 11/2/18    Last refill -7/7/17

## 2018-10-09 RX ORDER — CALCIUM CITRATE/VITAMIN D3 200MG-6.25
TABLET ORAL
Qty: 50 EACH | Refills: 3 | Status: SHIPPED | OUTPATIENT
Start: 2018-10-09 | End: 2019-07-09 | Stop reason: SDUPTHER

## 2018-10-09 RX ORDER — DILTIAZEM HYDROCHLORIDE 240 MG/1
CAPSULE, COATED, EXTENDED RELEASE ORAL
Qty: 30 CAPSULE | Refills: 3 | Status: SHIPPED | OUTPATIENT
Start: 2018-10-09 | End: 2019-01-29 | Stop reason: SDUPTHER

## 2018-10-09 RX ORDER — BLOOD PRESSURE TEST KIT
KIT MISCELLANEOUS
Qty: 100 EACH | Refills: 11 | Status: SHIPPED | OUTPATIENT
Start: 2018-10-09 | End: 2018-10-22 | Stop reason: SDUPTHER

## 2018-10-09 RX ORDER — SPIRONOLACTONE 50 MG/1
TABLET, FILM COATED ORAL
Qty: 30 TABLET | Refills: 3 | Status: SHIPPED | OUTPATIENT
Start: 2018-10-09 | End: 2019-01-29 | Stop reason: SDUPTHER

## 2018-10-09 RX ORDER — GLUCOSAM/CHON-MSM1/C/MANG/BOSW 500-416.6
TABLET ORAL
Qty: 100 EACH | Refills: 11 | Status: SHIPPED | OUTPATIENT
Start: 2018-10-09 | End: 2020-01-07

## 2018-10-10 RX ORDER — OMEPRAZOLE 20 MG/1
CAPSULE, DELAYED RELEASE ORAL
Qty: 30 CAPSULE | Refills: 0 | Status: SHIPPED | OUTPATIENT
Start: 2018-10-10 | End: 2018-10-22 | Stop reason: SDUPTHER

## 2018-10-17 ENCOUNTER — TELEPHONE (OUTPATIENT)
Dept: FAMILY MEDICINE CLINIC | Age: 63
End: 2018-10-17

## 2018-10-17 NOTE — TELEPHONE ENCOUNTER
Received a call from pharmacy regarding issue with pt insurance. Pharmacy states that the pt insurance has Dr. John Walker 'locked in' as the only one allowed to order C2's. Insurance informed pharmacy that Dr. Trina Live office would have to call the insurance to take his name off so another doctor is able to order the C2's.  Insurance number [phone] pt ID: [id number]

## 2018-10-19 ENCOUNTER — TELEPHONE (OUTPATIENT)
Dept: FAMILY MEDICINE CLINIC | Age: 63
End: 2018-10-19

## 2018-10-22 DIAGNOSIS — R09.81 NASAL CONGESTION: ICD-10-CM

## 2018-10-22 RX ORDER — UMECLIDINIUM 62.5 UG/1
AEROSOL, POWDER ORAL
Qty: 30 EACH | Refills: 0 | Status: SHIPPED | OUTPATIENT
Start: 2018-10-22 | End: 2018-12-11 | Stop reason: SDUPTHER

## 2018-10-22 RX ORDER — ALLOPURINOL 300 MG/1
TABLET ORAL
Qty: 30 TABLET | Refills: 0 | Status: SHIPPED | OUTPATIENT
Start: 2018-10-22 | End: 2018-11-02

## 2018-10-22 RX ORDER — BLOOD PRESSURE TEST KIT
KIT MISCELLANEOUS
Qty: 100 EACH | Refills: 0 | Status: SHIPPED | OUTPATIENT
Start: 2018-10-22 | End: 2019-01-14 | Stop reason: SDUPTHER

## 2018-10-22 RX ORDER — FLUTICASONE PROPIONATE 50 MCG
SPRAY, SUSPENSION (ML) NASAL
Qty: 16 G | Refills: 0 | Status: SHIPPED | OUTPATIENT
Start: 2018-10-22 | End: 2019-01-10 | Stop reason: SDUPTHER

## 2018-10-22 RX ORDER — ATORVASTATIN CALCIUM 40 MG/1
TABLET, FILM COATED ORAL
Qty: 30 TABLET | Refills: 0 | Status: SHIPPED | OUTPATIENT
Start: 2018-10-22 | End: 2018-12-11 | Stop reason: SDUPTHER

## 2018-10-22 RX ORDER — OMEPRAZOLE 20 MG/1
CAPSULE, DELAYED RELEASE ORAL
Qty: 30 CAPSULE | Refills: 0 | Status: SHIPPED | OUTPATIENT
Start: 2018-10-22 | End: 2018-12-11 | Stop reason: SDUPTHER

## 2018-10-22 RX ORDER — THIAMINE MONONITRATE (VIT B1) 100 MG
TABLET ORAL
Qty: 30 TABLET | Refills: 0 | Status: SHIPPED | OUTPATIENT
Start: 2018-10-22 | End: 2018-12-11 | Stop reason: SDUPTHER

## 2018-11-02 ENCOUNTER — OFFICE VISIT (OUTPATIENT)
Dept: FAMILY MEDICINE CLINIC | Age: 63
End: 2018-11-02
Payer: MEDICAID

## 2018-11-02 VITALS
WEIGHT: 225 LBS | DIASTOLIC BLOOD PRESSURE: 56 MMHG | BODY MASS INDEX: 35.31 KG/M2 | OXYGEN SATURATION: 93 % | HEIGHT: 67 IN | HEART RATE: 104 BPM | SYSTOLIC BLOOD PRESSURE: 144 MMHG

## 2018-11-02 DIAGNOSIS — E66.01 MORBID OBESITY (HCC): ICD-10-CM

## 2018-11-02 DIAGNOSIS — I85.10 SECONDARY ESOPHAGEAL VARICES WITHOUT BLEEDING (HCC): ICD-10-CM

## 2018-11-02 DIAGNOSIS — Z87.891 PERSONAL HISTORY OF TOBACCO USE: ICD-10-CM

## 2018-11-02 DIAGNOSIS — J41.8 MIXED SIMPLE AND MUCOPURULENT CHRONIC BRONCHITIS (HCC): ICD-10-CM

## 2018-11-02 DIAGNOSIS — K70.30 ALCOHOLIC CIRRHOSIS OF LIVER WITHOUT ASCITES (HCC): ICD-10-CM

## 2018-11-02 DIAGNOSIS — Z72.0 TOBACCO ABUSE: ICD-10-CM

## 2018-11-02 DIAGNOSIS — E78.2 MIXED HYPERLIPIDEMIA: ICD-10-CM

## 2018-11-02 DIAGNOSIS — I10 BENIGN ESSENTIAL HTN: ICD-10-CM

## 2018-11-02 LAB — HBA1C MFR BLD: 7 %

## 2018-11-02 PROCEDURE — G8417 CALC BMI ABV UP PARAM F/U: HCPCS | Performed by: FAMILY MEDICINE

## 2018-11-02 PROCEDURE — G8427 DOCREV CUR MEDS BY ELIG CLIN: HCPCS | Performed by: FAMILY MEDICINE

## 2018-11-02 PROCEDURE — 3045F PR MOST RECENT HEMOGLOBIN A1C LEVEL 7.0-9.0%: CPT | Performed by: FAMILY MEDICINE

## 2018-11-02 PROCEDURE — G8926 SPIRO NO PERF OR DOC: HCPCS | Performed by: FAMILY MEDICINE

## 2018-11-02 PROCEDURE — 2022F DILAT RTA XM EVC RTNOPTHY: CPT | Performed by: FAMILY MEDICINE

## 2018-11-02 PROCEDURE — 3023F SPIROM DOC REV: CPT | Performed by: FAMILY MEDICINE

## 2018-11-02 PROCEDURE — G8484 FLU IMMUNIZE NO ADMIN: HCPCS | Performed by: FAMILY MEDICINE

## 2018-11-02 PROCEDURE — 83036 HEMOGLOBIN GLYCOSYLATED A1C: CPT | Performed by: FAMILY MEDICINE

## 2018-11-02 PROCEDURE — 4004F PT TOBACCO SCREEN RCVD TLK: CPT | Performed by: FAMILY MEDICINE

## 2018-11-02 PROCEDURE — 99214 OFFICE O/P EST MOD 30 MIN: CPT | Performed by: FAMILY MEDICINE

## 2018-11-02 PROCEDURE — 3017F COLORECTAL CA SCREEN DOC REV: CPT | Performed by: FAMILY MEDICINE

## 2018-11-02 PROCEDURE — G0296 VISIT TO DETERM LDCT ELIG: HCPCS | Performed by: FAMILY MEDICINE

## 2018-11-02 RX ORDER — ALBUTEROL SULFATE 90 UG/1
AEROSOL, METERED RESPIRATORY (INHALATION)
Qty: 2 INHALER | Refills: 11 | Status: SHIPPED | OUTPATIENT
Start: 2018-11-02 | End: 2019-11-21 | Stop reason: SDUPTHER

## 2018-11-02 NOTE — PROGRESS NOTES
spray USE 1 SPRAY IN EACH NOSTRIL DAILY Yes NAYELY German CNP   atorvastatin (LIPITOR) 40 MG tablet TAKE ONE (1) tablet BY MOUTH DAILY Yes NAYELY German CNP   INCRUSE ELLIPTA 62.5 MCG/INH AEPB INHALE 1 PUFF DAILY Yes NAYELY German CNP   vitamin B-1 (THIAMINE) 100 MG tablet TAKE 1 TABLET DAILY Yes NAYELY German CNP   Alcohol Swabs PADS USE AS DIRECTED Yes NAYELY German CNP   metFORMIN (GLUCOPHAGE) 500 MG tablet TAKE ONE TABLET BY MOUTH 2 TIMES DAILY WITH MEALS Yes NAYELY German CNP   spironolactone (ALDACTONE) 50 MG tablet TAKE ONE (1) tablet BY MOUTH DAILY Yes NAYELY German CNP   diltiazem (CARDIZEM CD) 240 MG extended release capsule TAKE 1 CAPSULE BY MOUTH DAILY Yes NAYELY German CNP   TRUEPLUS LANCETS 30G MISC TEST 2 TIMES DAILY Yes NAYELY German CNP   TRUE METRIX BLOOD GLUCOSE TEST strip TEST 2 TIMES DAILY Yes NAYELY German CNP   BASAGLAR KWIKPEN 100 UNIT/ML injection pen INJECT 25 UNITS INTO THE SKIN DAILY WITH SUPPER Yes NAYELY German CNP   folic acid (FOLVITE) 1 MG tablet TAKE 1 TABLET BY MOUTH DAILY Yes NAYELY German CNP   UNIFINE PENTIPS 31G X 8 MM MISC USE ONCE DAILY WITH INJECTION Yes NAYELY German CNP   PROAIR  (90 Base) MCG/ACT inhaler INHALE 2 PUFFS EVERY 6 HOURS AS NEEDED FOR WHEEZING Yes Jaime Diez MD   gabapentin (NEURONTIN) 300 MG capsule TAKE three (3) capsules BY MOUTH THREE TIMES DAILY Yes Jaime Diez MD   MUCINEX 600 MG extended release tablet TAKE 1 TABLET BY MOUTH 2 TIMES DAILY Yes Jaime Diez MD   clobetasol (TEMOVATE) 0.05 % ointment  Yes Historical Provider, MD   QVAR REDIHALER 80 MCG/ACT AERB inhaler 2 puffs 2 times daily  Yes Historical Provider, MD   ipratropium-albuterol (DUONEB) 0.5-2.5 (3) MG/3ML SOLN nebulizer solution Inhale 1 vial into the Lids are normal. Right eye exhibits no discharge. Left eye exhibits no discharge. No scleral icterus. Neck: No JVD present. No thyromegaly present. Cardiovascular: Normal rate, regular rhythm and normal heart sounds. Pulmonary/Chest: Effort normal and breath sounds normal. No respiratory distress. Abdominal: Soft. There is no hepatosplenomegaly. There is tenderness in the right upper quadrant. Musculoskeletal: He exhibits no edema. Skin: Skin is warm and dry. No rash noted. He is not diaphoretic. No erythema. No pallor. Turgor normal   Psychiatric: He has a normal mood and affect. His behavior is normal. Judgment and thought content normal.   Nursing note and vitals reviewed. Results for POC orders placed in visit on 11/02/18   POCT glycosylated hemoglobin (Hb A1C)   Result Value Ref Range    Hemoglobin A1C 7.0 %          ASSESSMENT PLAN      Diagnosis Orders   1. Insulin dependent type 2 diabetes mellitus, uncontrolled (HCC)  POCT glycosylated hemoglobin (Hb A1C)   2. Personal history of tobacco use  TN VISIT TO DISCUSS LUNG CA SCREEN W LDCT    CT Lung Screening (Annual)   3. Mixed hyperlipidemia     4. Alcoholic cirrhosis of liver without ascites ([address]     5. Morbid obesity ([address]     6. Secondary esophageal varices without bleeding (HCC)     7. Mixed simple and mucopurulent chronic bronchitis (HCC)  albuterol sulfate HFA (PROAIR HFA) 108 (90 Base) MCG/ACT inhaler   8. Benign essential HTN     9. Tobacco abuse      overall sugar control remains very good. The low-dose CT scan initial.  He continues to smoke. He knows he should not. He is aware of the risk. We discussed cirrhosis, 20 was no treatment. We discussed avoiding NSAIDs acetaminophen and alcohol. Follow-up with Dr. William Monge after the first of the year. I would doubt that he would be a candidate for liver transplant. At this point he does not appear to have ascites, he did have varus sees on his scope. COPD baseline.   Blood

## 2018-11-08 DIAGNOSIS — E11.10 DIABETIC KETOACIDOSIS WITHOUT COMA ASSOCIATED WITH TYPE 2 DIABETES MELLITUS (HCC): ICD-10-CM

## 2018-11-08 RX ORDER — INSULIN GLARGINE 100 [IU]/ML
INJECTION, SOLUTION SUBCUTANEOUS
Qty: 15 ML | Refills: 5 | Status: SHIPPED | OUTPATIENT
Start: 2018-11-08 | End: 2019-01-14 | Stop reason: SDUPTHER

## 2018-11-12 ENCOUNTER — HOSPITAL ENCOUNTER (OUTPATIENT)
Dept: CT IMAGING | Age: 63
Discharge: HOME | End: 2018-11-12
Payer: COMMERCIAL

## 2018-11-12 DIAGNOSIS — Z87.891 HISTORY OF SMOKING 25-50 PACK YEARS: ICD-10-CM

## 2018-11-12 DIAGNOSIS — Z72.0 TOBACCO ABUSE: ICD-10-CM

## 2018-11-12 DIAGNOSIS — Z87.891 PERSONAL HISTORY OF TOBACCO USE: ICD-10-CM

## 2018-11-12 PROCEDURE — G0297 LDCT FOR LUNG CA SCREEN: HCPCS

## 2018-11-15 ENCOUNTER — TELEPHONE (OUTPATIENT)
Dept: CASE MANAGEMENT | Age: 63
End: 2018-11-15

## 2018-12-11 RX ORDER — OMEPRAZOLE 20 MG/1
CAPSULE, DELAYED RELEASE ORAL
Qty: 30 CAPSULE | Refills: 0 | Status: SHIPPED | OUTPATIENT
Start: 2018-12-11 | End: 2019-01-10 | Stop reason: SDUPTHER

## 2018-12-11 RX ORDER — THIAMINE MONONITRATE (VIT B1) 100 MG
TABLET ORAL
Qty: 30 TABLET | Refills: 0 | Status: SHIPPED | OUTPATIENT
Start: 2018-12-11 | End: 2019-01-18 | Stop reason: SDUPTHER

## 2018-12-11 RX ORDER — UMECLIDINIUM 62.5 UG/1
AEROSOL, POWDER ORAL
Qty: 1 EACH | Refills: 11 | Status: SHIPPED | OUTPATIENT
Start: 2018-12-11 | End: 2019-10-28 | Stop reason: SDUPTHER

## 2018-12-11 RX ORDER — ALLOPURINOL 300 MG/1
TABLET ORAL
Qty: 30 TABLET | Refills: 0 | Status: SHIPPED | OUTPATIENT
Start: 2018-12-11 | End: 2019-01-18 | Stop reason: SDUPTHER

## 2018-12-11 RX ORDER — ATORVASTATIN CALCIUM 40 MG/1
TABLET, FILM COATED ORAL
Qty: 30 TABLET | Refills: 0 | Status: SHIPPED | OUTPATIENT
Start: 2018-12-11 | End: 2019-01-18 | Stop reason: SDUPTHER

## 2018-12-13 ENCOUNTER — TELEPHONE (OUTPATIENT)
Dept: FAMILY MEDICINE CLINIC | Age: 63
End: 2018-12-13

## 2018-12-14 RX ORDER — BLOOD-GLUCOSE METER
1 KIT MISCELLANEOUS DAILY
Qty: 1 KIT | Refills: 0 | Status: SHIPPED | OUTPATIENT
Start: 2018-12-14

## 2019-01-10 DIAGNOSIS — K22.10 ULCER OF ESOPHAGUS WITHOUT BLEEDING: Primary | ICD-10-CM

## 2019-01-10 DIAGNOSIS — R09.81 NASAL CONGESTION: ICD-10-CM

## 2019-01-10 RX ORDER — FLUTICASONE PROPIONATE 50 MCG
SPRAY, SUSPENSION (ML) NASAL
Qty: 16 G | Refills: 0 | Status: SHIPPED | OUTPATIENT
Start: 2019-01-10 | End: 2019-01-29 | Stop reason: SDUPTHER

## 2019-01-10 RX ORDER — PEN NEEDLE, DIABETIC 31 GX5/16"
NEEDLE, DISPOSABLE MISCELLANEOUS
Qty: 100 EACH | Refills: 0 | Status: SHIPPED | OUTPATIENT
Start: 2019-01-10 | End: 2019-01-18 | Stop reason: SDUPTHER

## 2019-01-11 RX ORDER — OMEPRAZOLE 20 MG/1
CAPSULE, DELAYED RELEASE ORAL
Qty: 30 CAPSULE | Refills: 5 | Status: SHIPPED | OUTPATIENT
Start: 2019-01-11 | End: 2019-07-16 | Stop reason: SDUPTHER

## 2019-01-14 DIAGNOSIS — E11.10 DIABETIC KETOACIDOSIS WITHOUT COMA ASSOCIATED WITH TYPE 2 DIABETES MELLITUS (HCC): ICD-10-CM

## 2019-01-14 RX ORDER — UBIQUINOL 100 MG
CAPSULE ORAL
Qty: 100 EACH | Refills: 5 | Status: SHIPPED | OUTPATIENT
Start: 2019-01-14 | End: 2019-11-05 | Stop reason: SDUPTHER

## 2019-01-14 RX ORDER — INSULIN GLARGINE 100 [IU]/ML
INJECTION, SOLUTION SUBCUTANEOUS
Qty: 6 ML | Refills: 0 | Status: SHIPPED | OUTPATIENT
Start: 2019-01-14 | End: 2019-05-13 | Stop reason: SDUPTHER

## 2019-01-20 RX ORDER — ALLOPURINOL 300 MG/1
TABLET ORAL
Qty: 30 TABLET | Refills: 5 | Status: SHIPPED | OUTPATIENT
Start: 2019-01-20 | End: 2019-07-11 | Stop reason: SDUPTHER

## 2019-01-20 RX ORDER — PEN NEEDLE, DIABETIC 31 GX5/16"
NEEDLE, DISPOSABLE MISCELLANEOUS
Qty: 100 EACH | Refills: 5 | Status: SHIPPED | OUTPATIENT
Start: 2019-01-20 | End: 2021-05-11

## 2019-01-20 RX ORDER — THIAMINE MONONITRATE (VIT B1) 100 MG
TABLET ORAL
Qty: 30 TABLET | Refills: 5 | Status: SHIPPED | OUTPATIENT
Start: 2019-01-20 | End: 2019-07-11 | Stop reason: SDUPTHER

## 2019-01-20 RX ORDER — ATORVASTATIN CALCIUM 40 MG/1
TABLET, FILM COATED ORAL
Qty: 30 TABLET | Refills: 5 | Status: SHIPPED | OUTPATIENT
Start: 2019-01-20 | End: 2019-07-11 | Stop reason: SDUPTHER

## 2019-01-28 ENCOUNTER — TELEPHONE (OUTPATIENT)
Dept: FAMILY MEDICINE CLINIC | Age: 64
End: 2019-01-28

## 2019-01-29 DIAGNOSIS — R09.81 NASAL CONGESTION: ICD-10-CM

## 2019-01-29 RX ORDER — FLUTICASONE PROPIONATE 50 MCG
SPRAY, SUSPENSION (ML) NASAL
Qty: 16 G | Refills: 0 | Status: SHIPPED | OUTPATIENT
Start: 2019-01-29 | End: 2019-02-26 | Stop reason: SDUPTHER

## 2019-01-29 RX ORDER — DILTIAZEM HYDROCHLORIDE 240 MG/1
CAPSULE, COATED, EXTENDED RELEASE ORAL
Qty: 30 CAPSULE | Refills: 0 | Status: SHIPPED | OUTPATIENT
Start: 2019-01-29 | End: 2019-02-04 | Stop reason: SDUPTHER

## 2019-01-29 RX ORDER — SPIRONOLACTONE 50 MG/1
TABLET, FILM COATED ORAL
Qty: 30 TABLET | Refills: 0 | Status: SHIPPED | OUTPATIENT
Start: 2019-01-29 | End: 2019-02-04 | Stop reason: SDUPTHER

## 2019-01-30 ENCOUNTER — NURSE ONLY (OUTPATIENT)
Dept: FAMILY MEDICINE CLINIC | Age: 64
End: 2019-01-30
Payer: COMMERCIAL

## 2019-01-30 DIAGNOSIS — R39.9 UTI SYMPTOMS: Primary | ICD-10-CM

## 2019-01-30 LAB
BILIRUBIN, POC: NEGATIVE
BLOOD URINE, POC: NEGATIVE
CLARITY, POC: NORMAL
COLOR, POC: YELLOW
GLUCOSE URINE, POC: NEGATIVE
KETONES, POC: NEGATIVE
LEUKOCYTE EST, POC: NEGATIVE
NITRITE, POC: NEGATIVE
PH, POC: 6.5
PROTEIN, POC: 30
SPECIFIC GRAVITY, POC: 1.02
UROBILINOGEN, POC: 0.2

## 2019-01-30 PROCEDURE — 81002 URINALYSIS NONAUTO W/O SCOPE: CPT | Performed by: FAMILY MEDICINE

## 2019-02-04 ENCOUNTER — OFFICE VISIT (OUTPATIENT)
Dept: FAMILY MEDICINE CLINIC | Age: 64
End: 2019-02-04
Payer: COMMERCIAL

## 2019-02-04 VITALS
SYSTOLIC BLOOD PRESSURE: 172 MMHG | OXYGEN SATURATION: 97 % | HEART RATE: 92 BPM | BODY MASS INDEX: 35.27 KG/M2 | DIASTOLIC BLOOD PRESSURE: 52 MMHG | WEIGHT: 225.2 LBS

## 2019-02-04 DIAGNOSIS — K70.30 ALCOHOLIC CIRRHOSIS OF LIVER WITHOUT ASCITES (HCC): Primary | ICD-10-CM

## 2019-02-04 DIAGNOSIS — R03.0 ELEVATED BLOOD PRESSURE READING: ICD-10-CM

## 2019-02-04 DIAGNOSIS — K59.04 CHRONIC IDIOPATHIC CONSTIPATION: ICD-10-CM

## 2019-02-04 DIAGNOSIS — R10.30 LOWER ABDOMINAL PAIN: ICD-10-CM

## 2019-02-04 DIAGNOSIS — I10 BENIGN ESSENTIAL HTN: ICD-10-CM

## 2019-02-04 DIAGNOSIS — I85.10 SECONDARY ESOPHAGEAL VARICES WITHOUT BLEEDING (HCC): ICD-10-CM

## 2019-02-04 DIAGNOSIS — K70.10 CHRONIC ALCOHOLIC HEPATITIS: ICD-10-CM

## 2019-02-04 DIAGNOSIS — Z72.0 TOBACCO ABUSE: ICD-10-CM

## 2019-02-04 PROCEDURE — G8427 DOCREV CUR MEDS BY ELIG CLIN: HCPCS | Performed by: FAMILY MEDICINE

## 2019-02-04 PROCEDURE — G8484 FLU IMMUNIZE NO ADMIN: HCPCS | Performed by: FAMILY MEDICINE

## 2019-02-04 PROCEDURE — 3017F COLORECTAL CA SCREEN DOC REV: CPT | Performed by: FAMILY MEDICINE

## 2019-02-04 PROCEDURE — G8417 CALC BMI ABV UP PARAM F/U: HCPCS | Performed by: FAMILY MEDICINE

## 2019-02-04 PROCEDURE — 99214 OFFICE O/P EST MOD 30 MIN: CPT | Performed by: FAMILY MEDICINE

## 2019-02-04 PROCEDURE — 4004F PT TOBACCO SCREEN RCVD TLK: CPT | Performed by: FAMILY MEDICINE

## 2019-02-04 RX ORDER — DILTIAZEM HYDROCHLORIDE 360 MG/1
CAPSULE, EXTENDED RELEASE ORAL
Qty: 30 CAPSULE | Refills: 11 | Status: SHIPPED | OUTPATIENT
Start: 2019-02-04 | End: 2019-12-31

## 2019-02-04 RX ORDER — SPIRONOLACTONE 50 MG/1
TABLET, FILM COATED ORAL
Qty: 30 TABLET | Refills: 11 | Status: SHIPPED | OUTPATIENT
Start: 2019-02-04 | End: 2019-07-09 | Stop reason: SDUPTHER

## 2019-02-04 RX ORDER — ONDANSETRON 4 MG/1
4 TABLET, ORALLY DISINTEGRATING ORAL EVERY 8 HOURS PRN
Qty: 20 TABLET | Refills: 3 | Status: SHIPPED | OUTPATIENT
Start: 2019-02-04 | End: 2019-06-20 | Stop reason: SDUPTHER

## 2019-02-08 RX ORDER — FOLIC ACID 1 MG/1
TABLET ORAL
Qty: 30 TABLET | Refills: 0 | Status: SHIPPED | OUTPATIENT
Start: 2019-02-08 | End: 2019-03-05 | Stop reason: SDUPTHER

## 2019-02-11 ENCOUNTER — OFFICE VISIT (OUTPATIENT)
Dept: GASTROENTEROLOGY | Age: 64
End: 2019-02-11
Payer: COMMERCIAL

## 2019-02-11 ENCOUNTER — HOSPITAL ENCOUNTER (OUTPATIENT)
Age: 64
Discharge: HOME | End: 2019-02-11
Payer: COMMERCIAL

## 2019-02-11 VITALS
SYSTOLIC BLOOD PRESSURE: 148 MMHG | DIASTOLIC BLOOD PRESSURE: 60 MMHG | BODY MASS INDEX: 35.16 KG/M2 | WEIGHT: 224 LBS | HEIGHT: 67 IN

## 2019-02-11 DIAGNOSIS — K70.30 ALCOHOLIC CIRRHOSIS OF LIVER WITHOUT ASCITES (HCC): ICD-10-CM

## 2019-02-11 DIAGNOSIS — K70.30 ALCOHOLIC CIRRHOSIS OF LIVER WITHOUT ASCITES (HCC): Primary | ICD-10-CM

## 2019-02-11 LAB
A/G RATIO: 0.9 (ref 1.1–2.2)
ALBUMIN SERPL-MCNC: 3.9 G/DL (ref 3.4–5)
ALP BLD-CCNC: 171 U/L (ref 40–129)
ALT SERPL-CCNC: 21 U/L (ref 10–40)
ANION GAP SERPL CALCULATED.3IONS-SCNC: 19 MMOL/L (ref 3–16)
AST SERPL-CCNC: 25 U/L (ref 15–37)
BILIRUB SERPL-MCNC: <0.2 MG/DL (ref 0–1)
BUN BLDV-MCNC: 8 MG/DL (ref 7–20)
CALCIUM SERPL-MCNC: 9.6 MG/DL (ref 8.3–10.6)
CHLORIDE BLD-SCNC: 92 MMOL/L (ref 99–110)
CO2: 22 MMOL/L (ref 21–32)
CREAT SERPL-MCNC: 0.7 MG/DL (ref 0.8–1.3)
GFR AFRICAN AMERICAN: >60
GFR NON-AFRICAN AMERICAN: >60
GLOBULIN: 4.5 G/DL
GLUCOSE BLD-MCNC: 177 MG/DL (ref 70–99)
HCT VFR BLD CALC: 33.4 % (ref 40.5–52.5)
HEMOGLOBIN: 10.1 G/DL (ref 13.5–17.5)
INR BLD: 1.11 (ref 0.86–1.14)
MCH RBC QN AUTO: 21.5 PG (ref 26–34)
MCHC RBC AUTO-ENTMCNC: 30.4 G/DL (ref 31–36)
MCV RBC AUTO: 70.9 FL (ref 80–100)
PDW BLD-RTO: 19.4 % (ref 12.4–15.4)
PLATELET # BLD: 445 K/UL (ref 135–450)
PMV BLD AUTO: 7.5 FL (ref 5–10.5)
POTASSIUM SERPL-SCNC: 3.8 MMOL/L (ref 3.5–5.1)
PROTHROMBIN TIME: 12.6 SEC (ref 9.8–13)
RBC # BLD: 4.7 M/UL (ref 4.2–5.9)
SODIUM BLD-SCNC: 133 MMOL/L (ref 136–145)
TOTAL PROTEIN: 8.4 G/DL (ref 6.4–8.2)
WBC # BLD: 14.8 K/UL (ref 4–11)

## 2019-02-11 PROCEDURE — 3017F COLORECTAL CA SCREEN DOC REV: CPT | Performed by: INTERNAL MEDICINE

## 2019-02-11 PROCEDURE — 85610 PROTHROMBIN TIME: CPT

## 2019-02-11 PROCEDURE — G8417 CALC BMI ABV UP PARAM F/U: HCPCS | Performed by: INTERNAL MEDICINE

## 2019-02-11 PROCEDURE — G8484 FLU IMMUNIZE NO ADMIN: HCPCS | Performed by: INTERNAL MEDICINE

## 2019-02-11 PROCEDURE — G8427 DOCREV CUR MEDS BY ELIG CLIN: HCPCS | Performed by: INTERNAL MEDICINE

## 2019-02-11 PROCEDURE — 4004F PT TOBACCO SCREEN RCVD TLK: CPT | Performed by: INTERNAL MEDICINE

## 2019-02-11 PROCEDURE — 85027 COMPLETE CBC AUTOMATED: CPT

## 2019-02-11 PROCEDURE — 36415 COLL VENOUS BLD VENIPUNCTURE: CPT

## 2019-02-11 PROCEDURE — 99214 OFFICE O/P EST MOD 30 MIN: CPT | Performed by: INTERNAL MEDICINE

## 2019-02-11 PROCEDURE — 80053 COMPREHEN METABOLIC PANEL: CPT

## 2019-02-12 DIAGNOSIS — D64.9 ANEMIA, UNSPECIFIED TYPE: Primary | ICD-10-CM

## 2019-02-12 RX ORDER — POLYETHYLENE GLYCOL 3350 17 G/17G
POWDER, FOR SOLUTION ORAL
Qty: 238 G | Refills: 0 | Status: SHIPPED | OUTPATIENT
Start: 2019-02-12 | End: 2019-03-08 | Stop reason: SINTOL

## 2019-02-15 ENCOUNTER — HOSPITAL ENCOUNTER (OUTPATIENT)
Dept: ULTRASOUND IMAGING | Age: 64
Discharge: HOME | End: 2019-02-15
Payer: COMMERCIAL

## 2019-02-15 ENCOUNTER — HOSPITAL ENCOUNTER (OUTPATIENT)
Dept: GENERAL RADIOLOGY | Age: 64
Discharge: HOME | End: 2019-02-15
Payer: COMMERCIAL

## 2019-02-15 DIAGNOSIS — K70.30 ALCOHOLIC CIRRHOSIS OF LIVER WITHOUT ASCITES (HCC): ICD-10-CM

## 2019-02-15 DIAGNOSIS — D64.9 ANEMIA, UNSPECIFIED TYPE: ICD-10-CM

## 2019-02-15 PROCEDURE — 74250 X-RAY XM SM INT 1CNTRST STD: CPT

## 2019-02-15 PROCEDURE — 76705 ECHO EXAM OF ABDOMEN: CPT

## 2019-02-20 ENCOUNTER — HOSPITAL ENCOUNTER (OUTPATIENT)
Age: 64
Setting detail: OUTPATIENT SURGERY
Discharge: HOME | End: 2019-02-20
Attending: INTERNAL MEDICINE | Admitting: INTERNAL MEDICINE

## 2019-02-20 ENCOUNTER — HOSPITAL ENCOUNTER (OUTPATIENT)
Age: 64
Setting detail: OUTPATIENT SURGERY
Discharge: HOME | End: 2019-02-20
Attending: INTERNAL MEDICINE | Admitting: INTERNAL MEDICINE
Payer: COMMERCIAL

## 2019-02-20 ENCOUNTER — HOSPITAL ENCOUNTER (OUTPATIENT)
Dept: ENDOSCOPY | Age: 64
Setting detail: OUTPATIENT SURGERY
Discharge: HOME | End: 2019-02-20
Payer: COMMERCIAL

## 2019-02-20 PROCEDURE — 2720000010 HC SURG SUPPLY STERILE: Performed by: INTERNAL MEDICINE

## 2019-02-20 PROCEDURE — 91110 GI TRC IMG INTRAL ESOPH-ILE: CPT | Performed by: INTERNAL MEDICINE

## 2019-02-20 PROCEDURE — 91200 LIVER ELASTOGRAPHY: CPT

## 2019-02-20 PROCEDURE — 3609019000 HC EGD CAPSULE ENDOSCOPY: Performed by: INTERNAL MEDICINE

## 2019-02-20 PROCEDURE — 91200 LIVER ELASTOGRAPHY: CPT | Performed by: INTERNAL MEDICINE

## 2019-02-22 ENCOUNTER — TELEPHONE (OUTPATIENT)
Dept: FAMILY MEDICINE CLINIC | Age: 64
End: 2019-02-22

## 2019-02-26 ENCOUNTER — OFFICE VISIT (OUTPATIENT)
Dept: GASTROENTEROLOGY | Age: 64
End: 2019-02-26
Payer: COMMERCIAL

## 2019-02-26 VITALS
HEART RATE: 88 BPM | WEIGHT: 224 LBS | HEIGHT: 67 IN | OXYGEN SATURATION: 96 % | SYSTOLIC BLOOD PRESSURE: 130 MMHG | DIASTOLIC BLOOD PRESSURE: 62 MMHG | BODY MASS INDEX: 35.16 KG/M2

## 2019-02-26 DIAGNOSIS — K59.04 CHRONIC IDIOPATHIC CONSTIPATION: ICD-10-CM

## 2019-02-26 DIAGNOSIS — K74.60 LIVER CIRRHOSIS SECONDARY TO NASH (HCC): ICD-10-CM

## 2019-02-26 DIAGNOSIS — R09.81 NASAL CONGESTION: ICD-10-CM

## 2019-02-26 DIAGNOSIS — D50.0 IRON DEFICIENCY ANEMIA DUE TO CHRONIC BLOOD LOSS: Primary | ICD-10-CM

## 2019-02-26 DIAGNOSIS — K75.81 LIVER CIRRHOSIS SECONDARY TO NASH (HCC): ICD-10-CM

## 2019-02-26 PROCEDURE — 4004F PT TOBACCO SCREEN RCVD TLK: CPT | Performed by: INTERNAL MEDICINE

## 2019-02-26 PROCEDURE — G8417 CALC BMI ABV UP PARAM F/U: HCPCS | Performed by: INTERNAL MEDICINE

## 2019-02-26 PROCEDURE — 99214 OFFICE O/P EST MOD 30 MIN: CPT | Performed by: INTERNAL MEDICINE

## 2019-02-26 PROCEDURE — G8484 FLU IMMUNIZE NO ADMIN: HCPCS | Performed by: INTERNAL MEDICINE

## 2019-02-26 PROCEDURE — G8427 DOCREV CUR MEDS BY ELIG CLIN: HCPCS | Performed by: INTERNAL MEDICINE

## 2019-02-26 PROCEDURE — 3017F COLORECTAL CA SCREEN DOC REV: CPT | Performed by: INTERNAL MEDICINE

## 2019-02-26 RX ORDER — FLUTICASONE PROPIONATE 50 MCG
SPRAY, SUSPENSION (ML) NASAL
Qty: 16 G | Refills: 11 | Status: SHIPPED | OUTPATIENT
Start: 2019-02-26 | End: 2019-07-09

## 2019-02-26 RX ORDER — LANOLIN ALCOHOL/MO/W.PET/CERES
325 CREAM (GRAM) TOPICAL 2 TIMES DAILY
Qty: 180 TABLET | Refills: 1 | Status: SHIPPED | OUTPATIENT
Start: 2019-02-26 | End: 2019-07-09

## 2019-02-26 RX ORDER — DILTIAZEM HYDROCHLORIDE 360 MG/1
CAPSULE, EXTENDED RELEASE ORAL
COMMUNITY
Start: 2019-02-04 | End: 2019-02-26 | Stop reason: CLARIF

## 2019-03-05 ENCOUNTER — TELEPHONE (OUTPATIENT)
Dept: PULMONOLOGY | Age: 64
End: 2019-03-05

## 2019-03-05 RX ORDER — FOLIC ACID 1 MG/1
TABLET ORAL
Qty: 30 TABLET | Refills: 0 | Status: SHIPPED | OUTPATIENT
Start: 2019-03-05 | End: 2019-07-03

## 2019-03-08 ENCOUNTER — OFFICE VISIT (OUTPATIENT)
Dept: FAMILY MEDICINE CLINIC | Age: 64
End: 2019-03-08
Payer: COMMERCIAL

## 2019-03-08 VITALS
BODY MASS INDEX: 34.68 KG/M2 | HEART RATE: 90 BPM | WEIGHT: 221.4 LBS | DIASTOLIC BLOOD PRESSURE: 68 MMHG | SYSTOLIC BLOOD PRESSURE: 142 MMHG | OXYGEN SATURATION: 96 %

## 2019-03-08 DIAGNOSIS — J47.9 BRONCHIECTASIS WITHOUT COMPLICATION (HCC): ICD-10-CM

## 2019-03-08 DIAGNOSIS — I10 BENIGN ESSENTIAL HTN: ICD-10-CM

## 2019-03-08 DIAGNOSIS — E78.2 MIXED HYPERLIPIDEMIA: ICD-10-CM

## 2019-03-08 DIAGNOSIS — R31.0 GROSS HEMATURIA: ICD-10-CM

## 2019-03-08 DIAGNOSIS — K55.20 AVM (ARTERIOVENOUS MALFORMATION) OF SMALL BOWEL, ACQUIRED: ICD-10-CM

## 2019-03-08 DIAGNOSIS — K21.9 GASTROESOPHAGEAL REFLUX DISEASE WITHOUT ESOPHAGITIS: ICD-10-CM

## 2019-03-08 DIAGNOSIS — K59.04 CHRONIC IDIOPATHIC CONSTIPATION: ICD-10-CM

## 2019-03-08 DIAGNOSIS — E66.01 MORBID OBESITY (HCC): ICD-10-CM

## 2019-03-08 DIAGNOSIS — Z72.0 TOBACCO ABUSE: ICD-10-CM

## 2019-03-08 DIAGNOSIS — K75.81 LIVER CIRRHOSIS SECONDARY TO NASH (HCC): ICD-10-CM

## 2019-03-08 DIAGNOSIS — R10.11 RUQ PAIN: ICD-10-CM

## 2019-03-08 DIAGNOSIS — J41.8 MIXED SIMPLE AND MUCOPURULENT CHRONIC BRONCHITIS (HCC): ICD-10-CM

## 2019-03-08 DIAGNOSIS — K70.30 ALCOHOLIC CIRRHOSIS OF LIVER WITHOUT ASCITES (HCC): ICD-10-CM

## 2019-03-08 DIAGNOSIS — K74.60 LIVER CIRRHOSIS SECONDARY TO NASH (HCC): ICD-10-CM

## 2019-03-08 DIAGNOSIS — C67.9 MALIGNANT NEOPLASM OF URINARY BLADDER, UNSPECIFIED SITE (HCC): ICD-10-CM

## 2019-03-08 LAB
BILIRUBIN, POC: NEGATIVE
BLOOD URINE, POC: NEGATIVE
CLARITY, POC: CLEAR
COLOR, POC: YELLOW
GLUCOSE URINE, POC: NEGATIVE
KETONES, POC: NEGATIVE
LEUKOCYTE EST, POC: NEGATIVE
NITRITE, POC: NEGATIVE
PH, POC: 6.5
PROTEIN, POC: NEGATIVE
SPECIFIC GRAVITY, POC: 1.01
UROBILINOGEN, POC: 0.2

## 2019-03-08 PROCEDURE — G8484 FLU IMMUNIZE NO ADMIN: HCPCS | Performed by: FAMILY MEDICINE

## 2019-03-08 PROCEDURE — 3046F HEMOGLOBIN A1C LEVEL >9.0%: CPT | Performed by: FAMILY MEDICINE

## 2019-03-08 PROCEDURE — 3017F COLORECTAL CA SCREEN DOC REV: CPT | Performed by: FAMILY MEDICINE

## 2019-03-08 PROCEDURE — 99214 OFFICE O/P EST MOD 30 MIN: CPT | Performed by: FAMILY MEDICINE

## 2019-03-08 PROCEDURE — G8417 CALC BMI ABV UP PARAM F/U: HCPCS | Performed by: FAMILY MEDICINE

## 2019-03-08 PROCEDURE — 3023F SPIROM DOC REV: CPT | Performed by: FAMILY MEDICINE

## 2019-03-08 PROCEDURE — 36415 COLL VENOUS BLD VENIPUNCTURE: CPT | Performed by: FAMILY MEDICINE

## 2019-03-08 PROCEDURE — 81002 URINALYSIS NONAUTO W/O SCOPE: CPT | Performed by: FAMILY MEDICINE

## 2019-03-08 PROCEDURE — 4004F PT TOBACCO SCREEN RCVD TLK: CPT | Performed by: FAMILY MEDICINE

## 2019-03-08 PROCEDURE — G8427 DOCREV CUR MEDS BY ELIG CLIN: HCPCS | Performed by: FAMILY MEDICINE

## 2019-03-08 PROCEDURE — 2022F DILAT RTA XM EVC RTNOPTHY: CPT | Performed by: FAMILY MEDICINE

## 2019-03-08 PROCEDURE — G8926 SPIRO NO PERF OR DOC: HCPCS | Performed by: FAMILY MEDICINE

## 2019-03-08 RX ORDER — AMOXICILLIN 250 MG
1 CAPSULE ORAL DAILY
COMMUNITY
End: 2019-07-09

## 2019-03-08 RX ORDER — ONDANSETRON 4 MG/1
TABLET, FILM COATED ORAL
COMMUNITY
Start: 2019-02-04 | End: 2019-06-24 | Stop reason: SDUPTHER

## 2019-03-08 ASSESSMENT — PATIENT HEALTH QUESTIONNAIRE - PHQ9
1. LITTLE INTEREST OR PLEASURE IN DOING THINGS: 0
SUM OF ALL RESPONSES TO PHQ9 QUESTIONS 1 & 2: 0
SUM OF ALL RESPONSES TO PHQ QUESTIONS 1-9: 0
SUM OF ALL RESPONSES TO PHQ QUESTIONS 1-9: 0
2. FEELING DOWN, DEPRESSED OR HOPELESS: 0

## 2019-03-09 LAB
A/G RATIO: 1.4 (ref 1.1–2.2)
ALBUMIN SERPL-MCNC: 4.2 G/DL (ref 3.4–5)
ALP BLD-CCNC: 180 U/L (ref 40–129)
ALT SERPL-CCNC: 14 U/L (ref 10–40)
ANION GAP SERPL CALCULATED.3IONS-SCNC: 15 MMOL/L (ref 3–16)
AST SERPL-CCNC: 16 U/L (ref 15–37)
BILIRUB SERPL-MCNC: <0.2 MG/DL (ref 0–1)
BUN BLDV-MCNC: 8 MG/DL (ref 7–20)
CALCIUM SERPL-MCNC: 9.7 MG/DL (ref 8.3–10.6)
CHLORIDE BLD-SCNC: 92 MMOL/L (ref 99–110)
CHOLESTEROL, TOTAL: 173 MG/DL (ref 0–199)
CO2: 25 MMOL/L (ref 21–32)
CREAT SERPL-MCNC: 0.7 MG/DL (ref 0.8–1.3)
ESTIMATED AVERAGE GLUCOSE: 157.1 MG/DL
GFR AFRICAN AMERICAN: >60
GFR NON-AFRICAN AMERICAN: >60
GLOBULIN: 3.1 G/DL
GLUCOSE BLD-MCNC: 96 MG/DL (ref 70–99)
HBA1C MFR BLD: 7.1 %
HDLC SERPL-MCNC: 32 MG/DL (ref 40–60)
LDL CHOLESTEROL CALCULATED: 106 MG/DL
POTASSIUM SERPL-SCNC: 4.2 MMOL/L (ref 3.5–5.1)
SODIUM BLD-SCNC: 132 MMOL/L (ref 136–145)
TOTAL PROTEIN: 7.3 G/DL (ref 6.4–8.2)
TRIGL SERPL-MCNC: 177 MG/DL (ref 0–150)
VITAMIN B-12: 395 PG/ML (ref 211–911)
VLDLC SERPL CALC-MCNC: 35 MG/DL

## 2019-04-11 ENCOUNTER — OFFICE VISIT (OUTPATIENT)
Dept: GASTROENTEROLOGY | Age: 64
End: 2019-04-11
Payer: COMMERCIAL

## 2019-04-11 ENCOUNTER — HOSPITAL ENCOUNTER (OUTPATIENT)
Age: 64
Discharge: HOME | End: 2019-04-11
Payer: COMMERCIAL

## 2019-04-11 VITALS
BODY MASS INDEX: 34.21 KG/M2 | WEIGHT: 218 LBS | HEIGHT: 67 IN | DIASTOLIC BLOOD PRESSURE: 88 MMHG | SYSTOLIC BLOOD PRESSURE: 150 MMHG

## 2019-04-11 DIAGNOSIS — D50.0 IRON DEFICIENCY ANEMIA DUE TO CHRONIC BLOOD LOSS: ICD-10-CM

## 2019-04-11 DIAGNOSIS — D50.9 IRON DEFICIENCY ANEMIA, UNSPECIFIED IRON DEFICIENCY ANEMIA TYPE: ICD-10-CM

## 2019-04-11 DIAGNOSIS — K74.60 CIRRHOSIS OF LIVER WITHOUT ASCITES, UNSPECIFIED HEPATIC CIRRHOSIS TYPE (HCC): ICD-10-CM

## 2019-04-11 DIAGNOSIS — D50.9 IRON DEFICIENCY ANEMIA, UNSPECIFIED IRON DEFICIENCY ANEMIA TYPE: Primary | ICD-10-CM

## 2019-04-11 LAB
BASOPHILS ABSOLUTE: 0.1 K/UL (ref 0–0.2)
BASOPHILS RELATIVE PERCENT: 1 %
EOSINOPHILS ABSOLUTE: 0.2 K/UL (ref 0–0.6)
EOSINOPHILS RELATIVE PERCENT: 1.3 %
FERRITIN: 30.9 NG/ML (ref 30–400)
HCT VFR BLD CALC: 42.3 % (ref 40.5–52.5)
HEMOGLOBIN: 13.6 G/DL (ref 13.5–17.5)
IRON SATURATION: 20 % (ref 20–50)
IRON: 62 UG/DL (ref 59–158)
LYMPHOCYTES ABSOLUTE: 2.7 K/UL (ref 1–5.1)
LYMPHOCYTES RELATIVE PERCENT: 22.8 %
MCH RBC QN AUTO: 26 PG (ref 26–34)
MCHC RBC AUTO-ENTMCNC: 32.2 G/DL (ref 31–36)
MCV RBC AUTO: 80.7 FL (ref 80–100)
MONOCYTES ABSOLUTE: 0.7 K/UL (ref 0–1.3)
MONOCYTES RELATIVE PERCENT: 6 %
NEUTROPHILS ABSOLUTE: 8.2 K/UL (ref 1.7–7.7)
NEUTROPHILS RELATIVE PERCENT: 68.9 %
PDW BLD-RTO: 29.2 % (ref 12.4–15.4)
PLATELET # BLD: 369 K/UL (ref 135–450)
PMV BLD AUTO: 7.5 FL (ref 5–10.5)
RBC # BLD: 5.25 M/UL (ref 4.2–5.9)
TOTAL IRON BINDING CAPACITY: 309 UG/DL (ref 260–445)
WBC # BLD: 11.9 K/UL (ref 4–11)

## 2019-04-11 PROCEDURE — 99213 OFFICE O/P EST LOW 20 MIN: CPT | Performed by: INTERNAL MEDICINE

## 2019-04-11 PROCEDURE — 82728 ASSAY OF FERRITIN: CPT

## 2019-04-11 PROCEDURE — 83540 ASSAY OF IRON: CPT

## 2019-04-11 PROCEDURE — G8417 CALC BMI ABV UP PARAM F/U: HCPCS | Performed by: INTERNAL MEDICINE

## 2019-04-11 PROCEDURE — 36415 COLL VENOUS BLD VENIPUNCTURE: CPT

## 2019-04-11 PROCEDURE — G8427 DOCREV CUR MEDS BY ELIG CLIN: HCPCS | Performed by: INTERNAL MEDICINE

## 2019-04-11 PROCEDURE — 83550 IRON BINDING TEST: CPT

## 2019-04-11 PROCEDURE — 3017F COLORECTAL CA SCREEN DOC REV: CPT | Performed by: INTERNAL MEDICINE

## 2019-04-11 PROCEDURE — 85025 COMPLETE CBC W/AUTO DIFF WBC: CPT

## 2019-04-11 PROCEDURE — 4004F PT TOBACCO SCREEN RCVD TLK: CPT | Performed by: INTERNAL MEDICINE

## 2019-04-11 NOTE — PROGRESS NOTES
[address] ,  [address] [facility]  Phone: [phone] [facility]     Chief Complaint   Patient presents with    Follow-up     6 wk f/u       HPI     Thank you Radha Lyons MD for asking me to see Odilia Ahn in consultation. He is a  [4] White [1] 59 y.o. . male seen independently who presents with the following GI complaints:  . Odilia Ahn  Has been taking his iron bid. He has not had a repeat blood count. States he is now moving his bowels on otc stool softners. Stools are dark black on iron. HPI elements: location, severity, timing, modifying factors, quality, duration, context and associated signs/symptoms. Last Encounter Reviewed: 2/26/2019  Odilia Ahn  Is here for follow up after sbft/capsule endoscopy done for javad. SBFT normal.  CE with single sb non-bleeding avm. Has not tried iron. Has chronic constipation or narcotics. Miralax was not covered but worked fairly well. Lactulose gives him bloating and diarrhea. Takes frequent otc stimulants. Has intermittent hemorrhoidal swelling. Recent RUQ US normal as are labs including transaminases, inr, plt count, albumin. Alk phos is mildly elevated. Again confirms he is no longer drinking.     Last Encounter Reviewed: 2/11/2019  Ayah Early  Underwent liver biopsy in August confirming cirrhosis.  Had a mild iron deficiency at time of endoscopic workup.  Complains of 1 week of RLQ abdominal tenderness.     \"Overall, the liver biopsy is notable for cirrhosis.  The portal areas  show minimal-to-mild, mixed portal inflammation and bile duct damage,  without definitive florid duct lesions or granulomas seen (the laboratory  finding of elevated AMA and WADE is noted).  These findings suggest a  portal based injury, likely biliary, such as large duct obstruction,  drug/medication/herbal-induced biliary-type injury or infection involving  the biliary tree, among other potential biliary etiologies (primary  biliary cirrhosis, primary sclerosing cholangitis).  Steatosis with  ballooning degeneration of hepatocytes and rare Yennifer-Denk bodies are  seen, raising the possibility of a concurrent steatohepatitic injury  resulting from a dietary/metabolic cause, or  drug/medication/herbal/alcohol intake.  Correlation with clinical history  and laboratory findings is recommended. \"     Last Encounter Reviewed: 7/12/2018 Natalie Early  Complains of rectal bleeding and pain present 1 week, severe worse with constipation which has been present for at least the past year shortly after being started on narcotics unrelieved with miralax and side effects with lactulose, no other associated signs/symptoms. Brother just diagnosed with rectal cancer in his 46s.  Has a significant drinking history and still drinks 2-3 beers/d despite CT in February showing cirrhosis.  Denies being told he has cirrhosis. Pertinent PMH, FH, SH is reviewed below. Last EGD: 7/23/2018 erosive gastritis, grade 1 varices  Last Colonoscopy: 7/23/2018 3 small adenomas, diverticulosis    Review of available records reveals:   Wt Readings from Last 50 Encounters:   04/11/19 218 lb (98.9 kg)   03/08/19 221 lb 6.4 oz (100.4 kg)   02/26/19 224 lb (101.6 kg)   02/11/19 224 lb (101.6 kg)   02/04/19 225 lb 3.2 oz (102.2 kg)   11/02/18 225 lb (102.1 kg)   08/28/18 224 lb 6.4 oz (101.8 kg)   08/02/18 220 lb (99.8 kg)   07/23/18 220 lb (99.8 kg)   07/12/18 220 lb (99.8 kg)   06/25/18 201 lb 9.6 oz (91.4 kg)   04/06/18 202 lb 13.2 oz (92 kg)   03/23/18 202 lb 12.8 oz (92 kg)   03/20/18 203 lb 0.7 oz (92.1 kg)   02/27/18 203 lb (92.1 kg)   02/23/18 203 lb 6.4 oz (92.3 kg)   02/22/18 220 lb (99.8 kg)   02/21/18 207 lb 1.6 oz (93.9 kg)   02/02/18 226 lb (102.5 kg)   01/18/18 220 lb (99.8 kg)   11/28/17 223 lb 15.8 oz (101.6 kg)   10/13/17 223 lb 15.8 oz (101.6 kg)   10/03/17 224 lb (101.6 kg)   08/29/17 224 lb disease) ([facility]     COPD (chronic obstructive pulmonary disease) (HCC)     Diabetes mellitus (HCC)     Gout     Hilar adenopathy     Hyperlipidemia     Hypertension     Knee pain, right     Numbness and tingling of leg     Osteoarthritis     Paresthesia of bilateral legs     Seizures (HCC)     ongoing, began after swine flu vaccination    Substance abuse ([facility]      FAMILY HISTORY     Family History   Problem Relation Age of Onset    Cancer Mother         Lung    Emphysema Father     Diabetes Sister     Hypertension Sister     Cancer Brother         throat    Asthma Neg Hx      SOCIAL HISTORY     Social History     Socioeconomic History    Marital status:      Spouse name: Not on file    Number of children: Not on file    Years of education: Not on file    Highest education level: Not on file   Occupational History    Occupation: [employer]     Comment: not working currently   Social Needs    Financial resource strain: Not on file    Food insecurity:     Worry: Not on file     Inability: Not on file   [employer] needs:     Medical: Not on file     Non-medical: Not on file   Tobacco Use    Smoking status: Current Every Day Smoker     Packs/day: 0.50     Years: 54.00     Pack years: 27.00     Types: Cigarettes, Cigars     Start date: 9/4/2018    Smokeless tobacco: Former User     Types: Snuff    Tobacco comment: pt started smoking again in January 2019   Substance and Sexual Activity    Alcohol use:  Yes     Alcohol/week: 1.2 oz     Types: 2 Cans of beer per week     Comment: 2 cans beer daily     Drug use: No    Sexual activity: Yes     Partners: Female   Lifestyle    Physical activity:     Days per week: Not on file     Minutes per session: Not on file    Stress: Not on file   Relationships    Social connections:     Talks on phone: Not on file     Gets together: Not on file     Attends [employer] service: Not on file     Active member of club or organization: Not on file Attends meetings of clubs or organizations: Not on file     Relationship status: Not on file    Intimate partner violence:     Fear of current or ex partner: Not on file     Emotionally abused: Not on file     Physically abused: Not on file     Forced sexual activity: Not on file   Other Topics Concern    Not on file   Social History Narrative    Not on file     SURGICAL HISTORY     Past Surgical History:   Procedure Laterality Date    BRONCHOSCOPY  2/7/2011    bronch with EBUS    COLONOSCOPY N/A 7/23/2018    COLONOSCOPY WITH BIOPSY performed by Nikolai Osorio MD at [facility] N/A 7/23/2018    COLONOSCOPY POLYPECTOMY SNARE/COLD BIOPSY performed by Nikolai Osorio MD at [facility]  2/4/15    Urethral Dilatation, Resection of Bladder Tumor    CYSTOSCOPY  2/24/2016    fulgeration of bladder tumor    ELBOW SURGERY Left     ENDOSCOPY, SMALL BOWEL N/A 2/20/2019    BOWEL SMALL CAPSULE ENDOSCOPY performed by Nikolai Osorio MD at [place] [address]  05-    cystoscopy, bladder biopsy    OTHER SURGICAL HISTORY  09/09/2015    cystoscopy, urethral dilatation, bladder tumor follow up   [facility] 7/23/2018    EGD BIOPSY performed by Nikolai Osorio MD at [address] [place] [address]   (This list may include medications prescribed during this encounter as epic can not insert only the list prior to this encounter.)  Current Outpatient Rx   Medication Sig Dispense Refill    ondansetron (ZOFRAN) 4 MG tablet       senna-docusate (PERICOLACE) 8.6-50 MG per tablet Take 1 tablet by mouth daily Indications: 4 daily for 1 week then 1 hs/pharmacist recommendation      folic acid (FOLVITE) 1 MG tablet TAKE 1 TABLET BY MOUTH DAILY 30 tablet 0    fluticasone (FLONASE) 50 MCG/ACT nasal spray USE 1 SPRAY IN EACH NOSTRIL DAILY 16 g 11    metFORMIN (GLUCOPHAGE) 500 MG tablet TAKE ONE TABLET BY MOUTH 2 TIMES DAILY WITH MEALS 60 tablet 11    ferrous sulfate 325 (65 Fe) MG EC tablet Take 1 tablet by mouth 2 times daily 180 tablet 1    spironolactone (ALDACTONE) 50 MG tablet TAKE 1 TABLET BY MOUTH DAILY 30 tablet 11    diltiazem (CARDIZEM CD) 360 MG extended release capsule TAKE 1 CAPSULE BY MOUTH DAILY 30 capsule 11    ondansetron (ZOFRAN ODT) 4 MG disintegrating tablet Take 1 tablet by mouth every 8 hours as needed for Nausea or Vomiting 20 tablet 3    UNIFINE PENTIPS 31G X 8 MM MISC USE ONCE DAILY WITH INJECTION 100 each 5    vitamin B-1 (THIAMINE) 100 MG tablet TAKE 1 TABLET BY MOUTH DAILY 30 tablet 5    atorvastatin (LIPITOR) 40 MG tablet TAKE 1 TABLET BY MOUTH DAILY 30 tablet 5    allopurinol (ZYLOPRIM) 300 MG tablet TAKE 1 TABLET BY MOUTH DAILY 30 tablet 5    Alcohol Swabs (ALCOHOL PREP) 70 % PADS USE AS DIRECTED 100 each 5    BASAGLAR KWIKPEN 100 UNIT/ML injection pen INJECT 25 UNITS INTO THE SKIN DAILY WITH SUPPER 6 mL 0    omeprazole (PRILOSEC) 20 MG delayed release capsule TAKE 1 CAPSULE BY MOUTH DAILY 30 capsule 5    Blood Glucose Monitoring Suppl (FREESTYLE FREEDOM LITE) w/Device KIT 1 kit by Does not apply route daily 1 kit 0    blood glucose test strips (EXACTECH TEST) strip 1 each by In Vitro route 2 times daily As needed.  100 each 11    INCRUSE ELLIPTA 62.5 MCG/INH AEPB INHALE 1 PUFF DAILY 1 each 11    albuterol sulfate HFA (PROAIR HFA) 108 (90 Base) MCG/ACT inhaler INHALE 2 PUFFS EVERY 6 HOURS AS NEEDED FOR WHEEZING 2 Inhaler 11    TRUEPLUS LANCETS 30G MISC TEST 2 TIMES DAILY 100 each 11    TRUE METRIX BLOOD GLUCOSE TEST strip TEST 2 TIMES DAILY 50 each 3    MUCINEX 600 MG extended release tablet TAKE 1 TABLET BY MOUTH 2 TIMES DAILY 60 tablet 0    clobetasol (TEMOVATE) 0.05 % ointment       QVAR REDIHALER 80 MCG/ACT AERB inhaler 2 puffs 2 times daily       ipratropium-albuterol (DUONEB) 0.5-2.5 (3) MG/3ML SOLN nebulizer solution Inhale 1 vial into the lungs 4 times daily      oxyCODONE (ROXICODONE) 5 MG immediate release tablet every 6 hours as needed. Anabelle Quiros finasteride (PROSCAR) 5 MG tablet       budesonide (PULMICORT) 0.5 MG/2ML nebulizer suspension Take 2 mLs by nebulization 2 times daily. 60 ampule 11    OXYGEN Inhale 2 L/min into the lungs nightly as needed.  gabapentin (NEURONTIN) 300 MG capsule TAKE three (3) capsules BY MOUTH THREE TIMES DAILY 270 capsule 0     ALLERGIES     Allergies   Allergen Reactions    Lisinopril Swelling    Ace Inhibitors Swelling    Influenza Vaccines      He states he was hospitalized when he received his first flu vaccine    Spiriva Handihaler [Tiotropium Bromide Monohydrate] Swelling     Tolerates both tudorza and incruse     IMMUNIZATIONS     Immunization History   Administered Date(s) Administered    Td, unspecified formulation 11/03/2014     REVIEW OF SYSTEMS   See HPI for further details and pertinent postiives. Negative for the following:  Constitutional: Negative for weight change. Negative for appetite change and fatigue. HENT: Negative for nosebleeds, sore throat, mouth sores, and voice change. Respiratory: Negative for cough, choking and chest tightness. Cardiovascular: Negative for chest pain   Gastrointestinal: See HPI  Musculoskeletal: Negative for arthralgias. Skin: Negative for pallor. Neurological: Negative for weakness and light-headedness. Hematological: Negative for adenopathy. Does not bruise/bleed easily. Psychiatric/Behavioral: Negative for suicidal ideas. PHYSICAL EXAM   VITAL SIGNS: BP (!) 150/88 (Site: Right Upper Arm)   Ht 5' 7\" (1.702 m)   Wt 218 lb (98.9 kg)   BMI 34.14 kg/m²   Wt Readings from Last 3 Encounters:   04/11/19 218 lb (98.9 kg)   03/08/19 221 lb 6.4 oz (100.4 kg)   02/26/19 224 lb (101.6 kg)     Constitutional: Well developed, Well nourished, No acute distress, Non-toxic appearance.    HENT: Normocephalic, Atraumatic, Bilateral external ears normal, Oropharynx moist, No oral exudates, Nose normal.   Eyes: Conjunctiva normal, No discharge. Neck: Normal range of motion, No tenderness, Supple, No stridor. Lymphatic: No cervical, subclavian, or axillary lymphadenopathy. Cardiovascular: Normal heart rate, Normal rhythm, No murmurs, No rubs, No gallops. Thorax & Lungs: Normal breath sounds, No respiratory distress, No wheezing, No chest tenderness. No gynecomastia. Abdomen: scars consistent with stated surgeries, no hernias, no HSM, soft NTND   Rectal:  Deferred. Skin: Warm, Dry, No erythema, No rash. No bruising. No spider hemangiomas. Back: No tenderness, No CVA tenderness. Lower Extremities: Intact distal pulses, No edema, No tenderness, No cyanosis, No clubbing. Neurologic: Alert & oriented x 3, Normal motor function, Normal sensory function, No focal deficits noted. No asterixis. RADIOLOGY/PROCEDURES       FINAL IMPRESSION     Orders Placed This Encounter   Procedures    CBC WITH AUTO DIFFERENTIAL     Standing Status:   Future     Number of Occurrences:   1     Standing Expiration Date:   4/11/2020    Ferritin     Standing Status:   Future     Number of Occurrences:   1     Standing Expiration Date:   5/11/2019    Iron and TIBC     Standing Status:   Future     Number of Occurrences:   1     Standing Expiration Date:   5/11/2019     Order Specific Question:   Is Patient Fasting? Answer:   no     Order Specific Question:   No of Hours? Answer:   none     Eva Roc was seen today for follow-up. Diagnoses and all orders for this visit:    Iron deficiency anemia, unspecified iron deficiency anemia type  -     CBC WITH AUTO DIFFERENTIAL; Future  -     Ferritin; Future  -     Iron and TIBC;  Future    Cirrhosis of liver without ascites, unspecified hepatic cirrhosis type ([facility]      ORDERED FUTURE/PENDING TESTS     Lab Frequency Next Occurrence   COLONOSCOPY W/ OR W/O BIOPSY Once 07/26/2018   CBC Auto Differential Once 04/09/2019       FOLLOWUP Return in about 3 months (around 7/11/2019).           Andres GARCIA 4/11/19 1:00 PM    CC:  Ramesh Ahn MD

## 2019-04-12 NOTE — RESULT ENCOUNTER NOTE
Please call patient with normal results. Can stop iron. Will repeat cbc when he follows up in 3 months.

## 2019-05-01 RX ORDER — FOLIC ACID 1 MG/1
TABLET ORAL
Qty: 30 TABLET | Refills: 5 | Status: SHIPPED | OUTPATIENT
Start: 2019-05-01 | End: 2019-11-12 | Stop reason: SDUPTHER

## 2019-05-01 NOTE — TELEPHONE ENCOUNTER
Refill request for folic acid medication.      Name of [facility] visit - 3/8/19     Pending visit - 7/9/19    Last refill -3/5/19 by Jerald Corbett

## 2019-05-13 DIAGNOSIS — E11.10 DIABETIC KETOACIDOSIS WITHOUT COMA ASSOCIATED WITH TYPE 2 DIABETES MELLITUS (HCC): ICD-10-CM

## 2019-05-13 RX ORDER — INSULIN GLARGINE 100 [IU]/ML
INJECTION, SOLUTION SUBCUTANEOUS
Qty: 6 ML | Refills: 5 | Status: SHIPPED | OUTPATIENT
Start: 2019-05-13 | End: 2020-01-24 | Stop reason: SDUPTHER

## 2019-05-13 NOTE — TELEPHONE ENCOUNTER
Refill request for Basaglar medication.      Name of [facility] visit - 3/8/19     Pending visit - 7/9/19    Last refill -1/14/19

## 2019-05-29 ENCOUNTER — TELEPHONE (OUTPATIENT)
Dept: FAMILY MEDICINE CLINIC | Age: 64
End: 2019-05-29

## 2019-05-29 DIAGNOSIS — Z86.03 HISTORY OF NEOPLASM OF BLADDER: Primary | ICD-10-CM

## 2019-05-29 NOTE — TELEPHONE ENCOUNTER
Pt says he called Dr. Ezequiel Gibbs office at [facility] (Phone # [phone]) and they told him that we'd need to send a referral over for him to see the doctor. When I asked pt why he needed to see Dr. Cedric Helton - he said \"bladder check up\". Pt said he was supposed to follow up on a bladder tumor that was seen at some point in the past via cystoscopy. Fax referral to [phone] with any relevant records.

## 2019-06-20 DIAGNOSIS — R10.30 LOWER ABDOMINAL PAIN: ICD-10-CM

## 2019-06-24 RX ORDER — ONDANSETRON 4 MG/1
TABLET, FILM COATED ORAL
Qty: 20 TABLET | Refills: 0 | Status: SHIPPED | OUTPATIENT
Start: 2019-06-24 | End: 2019-08-26 | Stop reason: SDUPTHER

## 2019-06-26 ENCOUNTER — TELEPHONE (OUTPATIENT)
Dept: FAMILY MEDICINE CLINIC | Age: 64
End: 2019-06-26

## 2019-06-29 ENCOUNTER — APPOINTMENT (OUTPATIENT)
Dept: GENERAL RADIOLOGY | Age: 64
End: 2019-06-29
Payer: COMMERCIAL

## 2019-06-29 ENCOUNTER — HOSPITAL ENCOUNTER (EMERGENCY)
Age: 64
Discharge: HOME | End: 2019-06-29
Attending: EMERGENCY MEDICINE
Payer: COMMERCIAL

## 2019-06-29 ENCOUNTER — APPOINTMENT (OUTPATIENT)
Dept: CT IMAGING | Age: 64
End: 2019-06-29
Payer: COMMERCIAL

## 2019-06-29 VITALS
DIASTOLIC BLOOD PRESSURE: 75 MMHG | HEIGHT: 67 IN | RESPIRATION RATE: 23 BRPM | TEMPERATURE: 98 F | HEART RATE: 74 BPM | SYSTOLIC BLOOD PRESSURE: 167 MMHG | WEIGHT: 220 LBS | OXYGEN SATURATION: 94 % | BODY MASS INDEX: 34.53 KG/M2

## 2019-06-29 DIAGNOSIS — N12 PYELONEPHRITIS: Primary | ICD-10-CM

## 2019-06-29 LAB
A/G RATIO: 1 (ref 1.1–2.2)
ALBUMIN SERPL-MCNC: 4 G/DL (ref 3.4–5)
ALP BLD-CCNC: 148 U/L (ref 40–129)
ALT SERPL-CCNC: 18 U/L (ref 10–40)
ANION GAP SERPL CALCULATED.3IONS-SCNC: 15 MMOL/L (ref 3–16)
APTT: 31.7 SEC (ref 26–36)
AST SERPL-CCNC: 23 U/L (ref 15–37)
BASOPHILS ABSOLUTE: 0.1 K/UL (ref 0–0.2)
BASOPHILS RELATIVE PERCENT: 0.6 %
BILIRUB SERPL-MCNC: 0.3 MG/DL (ref 0–1)
BILIRUBIN URINE: NEGATIVE
BLOOD, URINE: ABNORMAL
BUN BLDV-MCNC: 12 MG/DL (ref 7–20)
CALCIUM SERPL-MCNC: 11.8 MG/DL (ref 8.3–10.6)
CHLORIDE BLD-SCNC: 95 MMOL/L (ref 99–110)
CLARITY: ABNORMAL
CO2: 24 MMOL/L (ref 21–32)
COLOR: YELLOW
COMMENT UA: ABNORMAL
CREAT SERPL-MCNC: 1.2 MG/DL (ref 0.8–1.3)
EOSINOPHILS ABSOLUTE: 0.2 K/UL (ref 0–0.6)
EOSINOPHILS RELATIVE PERCENT: 1.6 %
GFR AFRICAN AMERICAN: >60
GFR NON-AFRICAN AMERICAN: >60
GLOBULIN: 4.2 G/DL
GLUCOSE BLD-MCNC: 155 MG/DL (ref 70–99)
GLUCOSE URINE: NEGATIVE MG/DL
HCT VFR BLD CALC: 45.6 % (ref 40.5–52.5)
HEMOGLOBIN: 14.7 G/DL (ref 13.5–17.5)
INR BLD: 1.07 (ref 0.86–1.14)
KETONES, URINE: NEGATIVE MG/DL
LEUKOCYTE ESTERASE, URINE: ABNORMAL
LIPASE: 45 U/L (ref 13–60)
LYMPHOCYTES ABSOLUTE: 2.9 K/UL (ref 1–5.1)
LYMPHOCYTES RELATIVE PERCENT: 20.6 %
MCH RBC QN AUTO: 27 PG (ref 26–34)
MCHC RBC AUTO-ENTMCNC: 32.3 G/DL (ref 31–36)
MCV RBC AUTO: 83.6 FL (ref 80–100)
MICROSCOPIC EXAMINATION: YES
MONOCYTES ABSOLUTE: 0.7 K/UL (ref 0–1.3)
MONOCYTES RELATIVE PERCENT: 5.2 %
NEUTROPHILS ABSOLUTE: 10.3 K/UL (ref 1.7–7.7)
NEUTROPHILS RELATIVE PERCENT: 72 %
NITRITE, URINE: NEGATIVE
PDW BLD-RTO: 16.4 % (ref 12.4–15.4)
PH UA: 6.5 (ref 5–8)
PLATELET # BLD: 337 K/UL (ref 135–450)
PMV BLD AUTO: 7.4 FL (ref 5–10.5)
POTASSIUM REFLEX MAGNESIUM: 3.9 MMOL/L (ref 3.5–5.1)
PROTEIN UA: ABNORMAL MG/DL
PROTHROMBIN TIME: 12.2 SEC (ref 9.8–13)
RBC # BLD: 5.46 M/UL (ref 4.2–5.9)
RBC UA: ABNORMAL /HPF (ref 0–2)
SODIUM BLD-SCNC: 134 MMOL/L (ref 136–145)
SPECIFIC GRAVITY UA: <=1.005 (ref 1–1.03)
TOTAL PROTEIN: 8.2 G/DL (ref 6.4–8.2)
TROPONIN: <0.01 NG/ML
URINE TYPE: ABNORMAL
UROBILINOGEN, URINE: 0.2 E.U./DL
WBC # BLD: 14.3 K/UL (ref 4–11)
WBC UA: >100 /HPF (ref 0–5)

## 2019-06-29 PROCEDURE — 2580000003 HC RX 258: Performed by: EMERGENCY MEDICINE

## 2019-06-29 PROCEDURE — 87086 URINE CULTURE/COLONY COUNT: CPT

## 2019-06-29 PROCEDURE — 96361 HYDRATE IV INFUSION ADD-ON: CPT

## 2019-06-29 PROCEDURE — 81001 URINALYSIS AUTO W/SCOPE: CPT

## 2019-06-29 PROCEDURE — 93005 ELECTROCARDIOGRAM TRACING: CPT | Performed by: EMERGENCY MEDICINE

## 2019-06-29 PROCEDURE — 85610 PROTHROMBIN TIME: CPT

## 2019-06-29 PROCEDURE — 85730 THROMBOPLASTIN TIME PARTIAL: CPT

## 2019-06-29 PROCEDURE — 83690 ASSAY OF LIPASE: CPT

## 2019-06-29 PROCEDURE — 87077 CULTURE AEROBIC IDENTIFY: CPT

## 2019-06-29 PROCEDURE — 96365 THER/PROPH/DIAG IV INF INIT: CPT

## 2019-06-29 PROCEDURE — 6360000004 HC RX CONTRAST MEDICATION: Performed by: EMERGENCY MEDICINE

## 2019-06-29 PROCEDURE — 71046 X-RAY EXAM CHEST 2 VIEWS: CPT

## 2019-06-29 PROCEDURE — 99284 EMERGENCY DEPT VISIT MOD MDM: CPT

## 2019-06-29 PROCEDURE — 85025 COMPLETE CBC W/AUTO DIFF WBC: CPT

## 2019-06-29 PROCEDURE — 74177 CT ABD & PELVIS W/CONTRAST: CPT

## 2019-06-29 PROCEDURE — 80053 COMPREHEN METABOLIC PANEL: CPT

## 2019-06-29 PROCEDURE — 87186 SC STD MICRODIL/AGAR DIL: CPT

## 2019-06-29 PROCEDURE — 6360000002 HC RX W HCPCS: Performed by: EMERGENCY MEDICINE

## 2019-06-29 PROCEDURE — 84484 ASSAY OF TROPONIN QUANT: CPT

## 2019-06-29 RX ORDER — CEPHALEXIN 500 MG/1
500 CAPSULE ORAL 4 TIMES DAILY
Qty: 56 CAPSULE | Refills: 0 | Status: SHIPPED | OUTPATIENT
Start: 2019-06-29 | End: 2019-07-13

## 2019-06-29 RX ORDER — 0.9 % SODIUM CHLORIDE 0.9 %
500 INTRAVENOUS SOLUTION INTRAVENOUS ONCE
Status: COMPLETED | OUTPATIENT
Start: 2019-06-29 | End: 2019-06-29

## 2019-06-29 RX ORDER — ONDANSETRON 4 MG/1
4 TABLET, ORALLY DISINTEGRATING ORAL 3 TIMES DAILY PRN
Qty: 21 TABLET | Refills: 0 | Status: SHIPPED | OUTPATIENT
Start: 2019-06-29 | End: 2019-09-18 | Stop reason: SDUPTHER

## 2019-06-29 RX ORDER — ALBUTEROL SULFATE 2.5 MG/3ML
2.5 SOLUTION RESPIRATORY (INHALATION) ONCE
Status: COMPLETED | OUTPATIENT
Start: 2019-06-29 | End: 2019-06-29

## 2019-06-29 RX ADMIN — IOPAMIDOL 75 ML: 755 INJECTION, SOLUTION INTRAVENOUS at 16:44

## 2019-06-29 RX ADMIN — SODIUM CHLORIDE 500 ML: 9 INJECTION, SOLUTION INTRAVENOUS at 15:51

## 2019-06-29 RX ADMIN — ALBUTEROL SULFATE 2.5 MG: 2.5 SOLUTION RESPIRATORY (INHALATION) at 15:51

## 2019-06-29 RX ADMIN — CEFTRIAXONE SODIUM 1 G: 1 INJECTION, POWDER, FOR SOLUTION INTRAMUSCULAR; INTRAVENOUS at 18:04

## 2019-06-29 ASSESSMENT — PAIN DESCRIPTION - LOCATION: LOCATION: ABDOMEN

## 2019-06-29 ASSESSMENT — PAIN DESCRIPTION - ORIENTATION: ORIENTATION: LEFT;RIGHT;LOWER

## 2019-06-29 ASSESSMENT — PAIN SCALES - GENERAL: PAINLEVEL_OUTOF10: 8

## 2019-06-30 LAB
EKG ATRIAL RATE: 70 BPM
EKG DIAGNOSIS: NORMAL
EKG P AXIS: 74 DEGREES
EKG P-R INTERVAL: 240 MS
EKG Q-T INTERVAL: 398 MS
EKG QRS DURATION: 102 MS
EKG QTC CALCULATION (BAZETT): 429 MS
EKG R AXIS: -60 DEGREES
EKG T AXIS: 65 DEGREES
EKG VENTRICULAR RATE: 70 BPM

## 2019-06-30 PROCEDURE — 93010 ELECTROCARDIOGRAM REPORT: CPT | Performed by: INTERNAL MEDICINE

## 2019-07-02 LAB
ORGANISM: ABNORMAL
URINE CULTURE, ROUTINE: ABNORMAL
URINE CULTURE, ROUTINE: ABNORMAL

## 2019-07-09 ENCOUNTER — OFFICE VISIT (OUTPATIENT)
Dept: FAMILY MEDICINE CLINIC | Age: 64
End: 2019-07-09
Payer: COMMERCIAL

## 2019-07-09 VITALS
OXYGEN SATURATION: 97 % | DIASTOLIC BLOOD PRESSURE: 70 MMHG | SYSTOLIC BLOOD PRESSURE: 168 MMHG | WEIGHT: 220.6 LBS | HEART RATE: 82 BPM | BODY MASS INDEX: 34.55 KG/M2

## 2019-07-09 DIAGNOSIS — G56.02 CARPAL TUNNEL SYNDROME OF LEFT WRIST: ICD-10-CM

## 2019-07-09 DIAGNOSIS — K59.04 CHRONIC IDIOPATHIC CONSTIPATION: ICD-10-CM

## 2019-07-09 DIAGNOSIS — E78.2 MIXED HYPERLIPIDEMIA: ICD-10-CM

## 2019-07-09 DIAGNOSIS — I10 BENIGN ESSENTIAL HTN: ICD-10-CM

## 2019-07-09 DIAGNOSIS — K74.60 LIVER CIRRHOSIS SECONDARY TO NASH (HCC): ICD-10-CM

## 2019-07-09 DIAGNOSIS — G56.22 ULNAR NERVE ENTRAPMENT, LEFT: ICD-10-CM

## 2019-07-09 DIAGNOSIS — I85.10 SECONDARY ESOPHAGEAL VARICES WITHOUT BLEEDING (HCC): ICD-10-CM

## 2019-07-09 DIAGNOSIS — C67.9 MALIGNANT NEOPLASM OF URINARY BLADDER, UNSPECIFIED SITE (HCC): ICD-10-CM

## 2019-07-09 DIAGNOSIS — K70.30 ALCOHOLIC CIRRHOSIS OF LIVER WITHOUT ASCITES (HCC): ICD-10-CM

## 2019-07-09 DIAGNOSIS — Z72.0 TOBACCO ABUSE: ICD-10-CM

## 2019-07-09 DIAGNOSIS — J41.8 MIXED SIMPLE AND MUCOPURULENT CHRONIC BRONCHITIS (HCC): ICD-10-CM

## 2019-07-09 DIAGNOSIS — K75.81 LIVER CIRRHOSIS SECONDARY TO NASH (HCC): ICD-10-CM

## 2019-07-09 LAB — HBA1C MFR BLD: 6.7 %

## 2019-07-09 PROCEDURE — 2022F DILAT RTA XM EVC RTNOPTHY: CPT | Performed by: FAMILY MEDICINE

## 2019-07-09 PROCEDURE — G8427 DOCREV CUR MEDS BY ELIG CLIN: HCPCS | Performed by: FAMILY MEDICINE

## 2019-07-09 PROCEDURE — G8417 CALC BMI ABV UP PARAM F/U: HCPCS | Performed by: FAMILY MEDICINE

## 2019-07-09 PROCEDURE — G8926 SPIRO NO PERF OR DOC: HCPCS | Performed by: FAMILY MEDICINE

## 2019-07-09 PROCEDURE — 4004F PT TOBACCO SCREEN RCVD TLK: CPT | Performed by: FAMILY MEDICINE

## 2019-07-09 PROCEDURE — 3044F HG A1C LEVEL LT 7.0%: CPT | Performed by: FAMILY MEDICINE

## 2019-07-09 PROCEDURE — 3017F COLORECTAL CA SCREEN DOC REV: CPT | Performed by: FAMILY MEDICINE

## 2019-07-09 PROCEDURE — 99214 OFFICE O/P EST MOD 30 MIN: CPT | Performed by: FAMILY MEDICINE

## 2019-07-09 PROCEDURE — 83036 HEMOGLOBIN GLYCOSYLATED A1C: CPT | Performed by: FAMILY MEDICINE

## 2019-07-09 PROCEDURE — 3023F SPIROM DOC REV: CPT | Performed by: FAMILY MEDICINE

## 2019-07-09 RX ORDER — BUDESONIDE 0.5 MG/2ML
500 INHALANT ORAL 2 TIMES DAILY
Qty: 60 AMPULE | Refills: 11
Start: 2019-07-09 | End: 2021-04-13

## 2019-07-09 RX ORDER — SPIRONOLACTONE 50 MG/1
TABLET, FILM COATED ORAL
Qty: 60 TABLET | Refills: 11 | Status: SHIPPED | OUTPATIENT
Start: 2019-07-09 | End: 2020-06-08

## 2019-07-11 RX ORDER — ALLOPURINOL 300 MG/1
TABLET ORAL
Qty: 30 TABLET | Refills: 0 | Status: SHIPPED | OUTPATIENT
Start: 2019-07-11 | End: 2019-08-06 | Stop reason: SDUPTHER

## 2019-07-11 RX ORDER — THIAMINE MONONITRATE (VIT B1) 100 MG
TABLET ORAL
Qty: 30 TABLET | Refills: 0 | Status: SHIPPED | OUTPATIENT
Start: 2019-07-11 | End: 2019-08-06 | Stop reason: SDUPTHER

## 2019-07-11 RX ORDER — ATORVASTATIN CALCIUM 40 MG/1
TABLET, FILM COATED ORAL
Qty: 30 TABLET | Refills: 0 | Status: SHIPPED | OUTPATIENT
Start: 2019-07-11 | End: 2019-08-06 | Stop reason: SDUPTHER

## 2019-07-15 ENCOUNTER — OFFICE VISIT (OUTPATIENT)
Dept: GASTROENTEROLOGY | Age: 64
End: 2019-07-15
Payer: COMMERCIAL

## 2019-07-15 VITALS
SYSTOLIC BLOOD PRESSURE: 150 MMHG | DIASTOLIC BLOOD PRESSURE: 60 MMHG | WEIGHT: 214 LBS | BODY MASS INDEX: 33.59 KG/M2 | HEIGHT: 67 IN

## 2019-07-15 DIAGNOSIS — K74.60 CIRRHOSIS OF LIVER WITHOUT ASCITES, UNSPECIFIED HEPATIC CIRRHOSIS TYPE (HCC): Primary | ICD-10-CM

## 2019-07-15 PROCEDURE — 3017F COLORECTAL CA SCREEN DOC REV: CPT | Performed by: INTERNAL MEDICINE

## 2019-07-15 PROCEDURE — G8417 CALC BMI ABV UP PARAM F/U: HCPCS | Performed by: INTERNAL MEDICINE

## 2019-07-15 PROCEDURE — 4004F PT TOBACCO SCREEN RCVD TLK: CPT | Performed by: INTERNAL MEDICINE

## 2019-07-15 PROCEDURE — G8427 DOCREV CUR MEDS BY ELIG CLIN: HCPCS | Performed by: INTERNAL MEDICINE

## 2019-07-15 PROCEDURE — 99213 OFFICE O/P EST LOW 20 MIN: CPT | Performed by: INTERNAL MEDICINE

## 2019-07-15 NOTE — PROGRESS NOTES
Carla Chatman [facility] AT [address] ,  [address], [facility]  Phone: [phone] [address]     Chief Complaint   Patient presents with    Follow-up     f/u       HPI     Thank you Ashli Christianson MD for asking me to see Kem Garcia in consultation. He is a  [4] White [1] 59 y.o. . male seen independently who presents with the following GI complaints:  . Kem Garcia  Presents for routine follow up of his cirrhosis. Has only occasional beer. Recently admitted with pyelonephritis. Labs and CT done that admission and reviewed. HPI elements: location, severity, timing, modifying factors, quality, duration, context and associated signs/symptoms. Last Encounter Reviewed: 4/11/2019  Kem Garcia  Has been taking his iron bid. He has not had a repeat blood count. States he is now moving his bowels on otc stool softners. Stools are dark black on iron.     Last Encounter Reviewed: 2/26/2019  Nadirlaurie Kali here for follow up after sbft/capsule endoscopy done for javad.  SBFT normal.  CE with single sb non-bleeding avm.  Has not tried iron.  Has chronic constipation or narcotics.  Miralax was not covered but worked fairly well. Nova Brew gives him bloating and diarrhea.  Takes frequent otc stimulants.  Has intermittent hemorrhoidal swelling.  Recent RUQ US normal as are labs including transaminases, inr, plt count, albumin.  Alk phos is mildly elevated.  Again confirms he is no longer drinking.     Last Encounter Reviewed: 2/11/2019  Nayeli Dorantes JUAN MANUEL Early  Underwent liver biopsy in August confirming cirrhosis.  Had a mild iron deficiency at time of endoscopic workup.  Complains of 1 week of RLQ abdominal tenderness.     \"Overall, the liver biopsy is notable for cirrhosis.  The portal areas  show minimal-to-mild, mixed portal inflammation and bile duct damage,  without definitive florid duct lesions or granulomas seen (the lb 0.7 oz (92.1 kg)   02/27/18 203 lb (92.1 kg)   02/23/18 203 lb 6.4 oz (92.3 kg)   02/22/18 220 lb (99.8 kg)   02/21/18 207 lb 1.6 oz (93.9 kg)   02/02/18 226 lb (102.5 kg)   01/18/18 220 lb (99.8 kg)   11/28/17 223 lb 15.8 oz (101.6 kg)   10/13/17 223 lb 15.8 oz (101.6 kg)   10/03/17 224 lb (101.6 kg)   08/29/17 224 lb 9.6 oz (101.9 kg)   03/03/17 230 lb (104.3 kg)   02/11/17 225 lb (102.1 kg)   12/02/16 233 lb 9.6 oz (106 kg)   08/30/16 221 lb 9.6 oz (100.5 kg)   07/10/16 227 lb (103 kg)   05/10/16 223 lb 9.6 oz (101.4 kg)   02/15/16 226 lb (102.5 kg)   02/09/16 226 lb 6.4 oz (102.7 kg)   11/09/15 221 lb 4.8 oz (100.4 kg)   09/04/15 215 lb (97.5 kg)   09/03/15 215 lb 9.6 oz (97.8 kg)   08/04/15 213 lb 12.8 oz (97 kg)   05/01/15 220 lb (99.8 kg)   05/04/15 213 lb (96.6 kg)   02/04/15 214 lb (97.1 kg)   02/03/15 208 lb 4.8 oz (94.5 kg)   11/03/14 206 lb 9.6 oz (93.7 kg)   08/01/14 205 lb (93 kg)   05/30/14 207 lb 4.8 oz (94 kg)   04/23/14 212 lb 9.6 oz (96.4 kg)   11/18/13 207 lb 6.4 oz (94.1 kg)   07/15/13 208 lb 6.4 oz (94.5 kg)   05/31/13 212 lb 9.6 oz (96.4 kg)       No components found for: HGBA1C  BP Readings from Last 3 Encounters:   07/15/19 (!) 150/60   07/09/19 (!) 168/70   06/29/19 (!) 167/75     Health Maintenance   Topic Date Due    Hepatitis A vaccine (1 of 2 - Risk 2-dose series) 10/14/1956    Pneumococcal 0-64 years Vaccine (1 of 1 - PPSV23) 10/14/1962    Hepatitis B Vaccine (1 of 3 - Risk 3-dose series) 10/14/1975    DTaP/Tdap/Td vaccine (1 - Tdap) 11/04/2014    Diabetic microalbuminuria test  03/22/2019    Shingles Vaccine (1 of 2) 11/12/2019 (Originally 10/14/2006)    Diabetic retinal exam  02/15/2020    Lipid screen  03/08/2020    Potassium monitoring  06/29/2020    Creatinine monitoring  06/29/2020    Diabetic foot exam  07/09/2020    A1C test (Diabetic or Prediabetic)  07/09/2020    Colon cancer screen colonoscopy  07/23/2021    Hepatitis C screen  Completed    HIV screen Completed       No components found for: [address] HISTORY     Past Medical History:   Diagnosis Date    Bronchitis chronic     Chest pain     COPD (chronic obstructive pulmonary disease) (HCC)     COPD (chronic obstructive pulmonary disease) (HCC)     Diabetes mellitus (HCC)     Gout     Hilar adenopathy     Hyperlipidemia     Hypertension     Knee pain, right     Numbness and tingling of leg     Osteoarthritis     Paresthesia of bilateral legs     Seizures (HCC)     ongoing, began after swine flu vaccination    Substance abuse ([address]      FAMILY HISTORY     Family History   Problem Relation Age of Onset   [place] Cancer Mother         Lung    Emphysema Father     Diabetes Sister     Hypertension Sister     Cancer Brother         throat    Asthma Neg Hx      SOCIAL HISTORY     Social History     Socioeconomic History    Marital status:      Spouse name: Not on file    Number of children: Not on file    Years of education: Not on file    Highest education level: Not on file   Occupational History    Occupation: "[employer]     Comment: not working currently   Social Needs    Financial resource strain: Not on file    Food insecurity:     Worry: Not on file     Inability: Not on file   [employer] needs:     Medical: Not on file     Non-medical: Not on file   Tobacco Use    Smoking status: Current Every Day Smoker     Packs/day: 0.50     Years: 54.00     Pack years: 27.00     Types: Cigarettes, Cigars     Start date: 9/4/2018    Smokeless tobacco: Former User     Types: Snuff    Tobacco comment: pt started smoking again in January 2019   Substance and Sexual Activity    Alcohol use: Not Currently     Alcohol/week: 2.0 standard drinks     Types: 2 Cans of beer per week     Comment: 2 cans beer daily     Drug use: No    Sexual activity: Yes     Partners: Female   Lifestyle    Physical activity:     Days per week: Not on file     Minutes per session: Not on file

## 2019-07-16 DIAGNOSIS — K22.10 ULCER OF ESOPHAGUS WITHOUT BLEEDING: ICD-10-CM

## 2019-07-16 RX ORDER — OMEPRAZOLE 20 MG/1
CAPSULE, DELAYED RELEASE ORAL
Qty: 30 CAPSULE | Refills: 5 | Status: SHIPPED | OUTPATIENT
Start: 2019-07-16 | End: 2019-09-18 | Stop reason: SDUPTHER

## 2019-07-18 ENCOUNTER — TELEPHONE (OUTPATIENT)
Dept: FAMILY MEDICINE CLINIC | Age: 64
End: 2019-07-18

## 2019-07-18 DIAGNOSIS — I10 BENIGN ESSENTIAL HTN: Primary | ICD-10-CM

## 2019-07-18 RX ORDER — ADHESIVE BANDAGE 3/4"
BANDAGE TOPICAL
Qty: 1 EACH | Refills: 0 | Status: SHIPPED | OUTPATIENT
Start: 2019-07-18

## 2019-07-29 ENCOUNTER — TELEPHONE (OUTPATIENT)
Dept: FAMILY MEDICINE CLINIC | Age: 64
End: 2019-07-29

## 2019-07-29 VITALS — SYSTOLIC BLOOD PRESSURE: 139 MMHG | DIASTOLIC BLOOD PRESSURE: 73 MMHG | HEART RATE: 113 BPM

## 2019-08-22 ENCOUNTER — TELEPHONE (OUTPATIENT)
Dept: FAMILY MEDICINE CLINIC | Age: 64
End: 2019-08-22

## 2019-08-26 DIAGNOSIS — R10.30 LOWER ABDOMINAL PAIN: ICD-10-CM

## 2019-08-26 RX ORDER — ONDANSETRON 4 MG/1
TABLET, FILM COATED ORAL
Qty: 20 TABLET | Refills: 0 | Status: SHIPPED | OUTPATIENT
Start: 2019-08-26 | End: 2020-05-15

## 2019-09-17 ENCOUNTER — OFFICE VISIT (OUTPATIENT)
Dept: PULMONOLOGY | Age: 64
End: 2019-09-17
Payer: COMMERCIAL

## 2019-09-17 VITALS
RESPIRATION RATE: 16 BRPM | WEIGHT: 207.4 LBS | BODY MASS INDEX: 32.55 KG/M2 | SYSTOLIC BLOOD PRESSURE: 128 MMHG | DIASTOLIC BLOOD PRESSURE: 67 MMHG | HEART RATE: 88 BPM | HEIGHT: 67 IN | TEMPERATURE: 98.2 F | OXYGEN SATURATION: 96 %

## 2019-09-17 DIAGNOSIS — J44.9 CHRONIC OBSTRUCTIVE PULMONARY DISEASE, UNSPECIFIED COPD TYPE (HCC): Primary | ICD-10-CM

## 2019-09-17 PROCEDURE — 3017F COLORECTAL CA SCREEN DOC REV: CPT | Performed by: INTERNAL MEDICINE

## 2019-09-17 PROCEDURE — 99214 OFFICE O/P EST MOD 30 MIN: CPT | Performed by: INTERNAL MEDICINE

## 2019-09-17 PROCEDURE — G8417 CALC BMI ABV UP PARAM F/U: HCPCS | Performed by: INTERNAL MEDICINE

## 2019-09-17 PROCEDURE — 3023F SPIROM DOC REV: CPT | Performed by: INTERNAL MEDICINE

## 2019-09-17 PROCEDURE — G8427 DOCREV CUR MEDS BY ELIG CLIN: HCPCS | Performed by: INTERNAL MEDICINE

## 2019-09-17 PROCEDURE — 4004F PT TOBACCO SCREEN RCVD TLK: CPT | Performed by: INTERNAL MEDICINE

## 2019-09-17 PROCEDURE — G8926 SPIRO NO PERF OR DOC: HCPCS | Performed by: INTERNAL MEDICINE

## 2019-09-17 RX ORDER — GABAPENTIN 300 MG/1
300 CAPSULE ORAL 3 TIMES DAILY
COMMUNITY
Start: 2019-09-16 | End: 2019-09-18 | Stop reason: SDUPTHER

## 2019-09-17 RX ORDER — FLUTICASONE PROPIONATE 50 MCG
SPRAY, SUSPENSION (ML) NASAL
COMMUNITY
Start: 2019-07-23 | End: 2019-10-15

## 2019-09-17 NOTE — PROGRESS NOTES
Chief Complaint/Referring Provider:  Patient is being seen at the request of Margette Aase for a consultation for COPD     Interval History:     shortness of breath - uses proair and incruse. Tobacco is at about 10 per day. Neck pain and upper back pain. Pain radiates down the arm on right, pain shoots down to lateral arm/tricep, then involves all 5 fingers. Sometimes radiates to right great toe. Tried chiropractic. Ordered MRI. Denied by insurer. Presenting HPI: This is a 64 yo WM seen by me 7 years ago for hilar lymphadenopathy, had EBUS which was reassuring, now here with with a four-week history of increased severe dyspnea worse with exertion associated with significant cough which is minimally productive, admitted to [facility] on 1/9/18 and treated for an acute exacerbation of COPD. He had minimal improvement in the hospital.  However he saw his primary care provider and was prescribed steroids and antibiotic and did notice some improvement although he tells me he is now somewhat worse again and he is definitely nowhere near his baseline of last December. His baseline and he does have some shortness of breath although it's only with significant exertion and was not troubling to him in the past.       reports that he has been smoking cigarettes and cigars. He started smoking about a year ago. He has a 27.00 pack-year smoking history. He has quit using smokeless tobacco.  His smokeless tobacco use included snuff.    family history includes Cancer in his brother and mother; Diabetes in his sister; Emphysema in his father; Hypertension in his sister. Physical Exam:  Blood pressure 128/67, pulse 88, temperature 98.2 °F (36.8 °C), temperature source Oral, resp. rate 16, height 5' 7\" (1.702 m), weight 207 lb 6.4 oz (94.1 kg), SpO2 96 %.'  Constitutional:  No acute distress. HENT:  Oropharynx is clear and moist.   Neck: No tracheal deviation present.    Cardiovascular: Normal

## 2019-09-18 ENCOUNTER — OFFICE VISIT (OUTPATIENT)
Dept: FAMILY MEDICINE CLINIC | Age: 64
End: 2019-09-18
Payer: COMMERCIAL

## 2019-09-18 VITALS
TEMPERATURE: 97.7 F | DIASTOLIC BLOOD PRESSURE: 60 MMHG | OXYGEN SATURATION: 93 % | HEIGHT: 67 IN | WEIGHT: 209 LBS | SYSTOLIC BLOOD PRESSURE: 144 MMHG | HEART RATE: 83 BPM | BODY MASS INDEX: 32.8 KG/M2

## 2019-09-18 DIAGNOSIS — M47.812 CERVICAL SPINE ARTHRITIS: ICD-10-CM

## 2019-09-18 DIAGNOSIS — R20.0 NUMBNESS AND TINGLING IN RIGHT HAND: ICD-10-CM

## 2019-09-18 DIAGNOSIS — M54.2 NECK PAIN: Primary | ICD-10-CM

## 2019-09-18 DIAGNOSIS — K21.9 GASTROESOPHAGEAL REFLUX DISEASE WITHOUT ESOPHAGITIS: ICD-10-CM

## 2019-09-18 DIAGNOSIS — R20.2 NUMBNESS AND TINGLING IN RIGHT HAND: ICD-10-CM

## 2019-09-18 PROCEDURE — G8417 CALC BMI ABV UP PARAM F/U: HCPCS | Performed by: NURSE PRACTITIONER

## 2019-09-18 PROCEDURE — G8427 DOCREV CUR MEDS BY ELIG CLIN: HCPCS | Performed by: NURSE PRACTITIONER

## 2019-09-18 PROCEDURE — 3017F COLORECTAL CA SCREEN DOC REV: CPT | Performed by: NURSE PRACTITIONER

## 2019-09-18 PROCEDURE — 99215 OFFICE O/P EST HI 40 MIN: CPT | Performed by: NURSE PRACTITIONER

## 2019-09-18 PROCEDURE — 4004F PT TOBACCO SCREEN RCVD TLK: CPT | Performed by: NURSE PRACTITIONER

## 2019-09-18 RX ORDER — OMEPRAZOLE 40 MG/1
CAPSULE, DELAYED RELEASE ORAL
Qty: 30 CAPSULE | Refills: 5 | Status: SHIPPED | OUTPATIENT
Start: 2019-09-18 | End: 2020-04-27

## 2019-09-18 ASSESSMENT — ENCOUNTER SYMPTOMS
ABDOMINAL DISTENTION: 0
ANAL BLEEDING: 0
DIARRHEA: 0
VISUAL CHANGE: 0
NAUSEA: 0
BLOOD IN STOOL: 0
ABDOMINAL PAIN: 0
RECTAL PAIN: 0
TROUBLE SWALLOWING: 0
PHOTOPHOBIA: 0
VOMITING: 0
RESPIRATORY NEGATIVE: 1
BACK PAIN: 0
CONSTIPATION: 0

## 2019-09-18 NOTE — PROGRESS NOTES
Packs/day: 0.50     Years: 54.00     Pack years: 27.00     Types: Cigarettes, Cigars     Start date: 9/4/2018    Smokeless tobacco: Former User     Types: Snuff    Tobacco comment: pt started smoking again in January 2019   Substance Use Topics    Alcohol use: Not Currently     Alcohol/week: 2.0 standard drinks     Types: 2 Cans of beer per week     Comment: 2 cans beer daily          Current Outpatient Medications   Medication Sig Dispense Refill    omeprazole (PRILOSEC) 40 MG delayed release capsule TAKE 1 CAPSULE BY MOUTH DAILY 30 capsule 5    fluticasone (FLONASE) 50 MCG/ACT nasal spray       ondansetron (ZOFRAN) 4 MG tablet TAKE ONE TABLET BY MOUTH EVERY 8 HOURS AS NEEDED FOR NAUSEA OR VOMITING 20 tablet 0    vitamin B-1 (THIAMINE) 100 MG tablet TAKE 1 TABLET BY MOUTH DAILY 30 tablet 5    allopurinol (ZYLOPRIM) 300 MG tablet TAKE 1 TABLET BY MOUTH DAILY 30 tablet 5    atorvastatin (LIPITOR) 40 MG tablet TAKE 1 TABLET BY MOUTH DAILY 30 tablet 5    Blood Pressure Monitoring (BLOOD PRESSURE CUFF) MISC Please dispense insurance preference 1 each 0    metFORMIN (GLUCOPHAGE) 500 MG tablet TAKE ONE TABLET BY MOUTH 2 TIMES DAILY WITH MEALS 60 tablet 11    spironolactone (ALDACTONE) 50 MG tablet TAKE 1 TABLET BY MOUTH TWICE DAILY 60 tablet 11    budesonide (PULMICORT) 0.5 MG/2ML nebulizer suspension Take 2 mLs by nebulization 2 times daily 60 ampule 11    BASAGLAR KWIKPEN 100 UNIT/ML injection pen INJECT 25 UNITS INTO THE SKIN DAILY WITH SUPPER 6 mL 5    folic acid (FOLVITE) 1 MG tablet TAKE 1 TABLET BY MOUTH DAILY 30 tablet 5    UNIFINE PENTIPS 31G X 8 MM MISC USE ONCE DAILY WITH INJECTION 100 each 5    Alcohol Swabs (ALCOHOL PREP) 70 % PADS USE AS DIRECTED 100 each 5    Blood Glucose Monitoring Suppl (FREESTYLE FREEDOM LITE) w/Device KIT 1 kit by Does not apply route daily 1 kit 0    blood glucose test strips (EXACTECH TEST) strip 1 each by In Vitro route 2 times daily As needed.  100 each 11    INCRUSE ELLIPTA 62.5 MCG/INH AEPB INHALE 1 PUFF DAILY 1 each 11    albuterol sulfate HFA (PROAIR HFA) 108 (90 Base) MCG/ACT inhaler INHALE 2 PUFFS EVERY 6 HOURS AS NEEDED FOR WHEEZING 2 Inhaler 11    TRUEPLUS LANCETS 30G MISC TEST 2 TIMES DAILY 100 each 11    gabapentin (NEURONTIN) 300 MG capsule TAKE three (3) capsules BY MOUTH THREE TIMES DAILY 270 capsule 0    ipratropium-albuterol (DUONEB) 0.5-2.5 (3) MG/3ML SOLN nebulizer solution Inhale 1 vial into the lungs 4 times daily      oxyCODONE (ROXICODONE) 5 MG immediate release tablet every 6 hours as needed. Venora Decker OXYGEN Inhale 2 L/min into the lungs nightly as needed.  diltiazem (CARDIZEM CD) 360 MG extended release capsule TAKE 1 CAPSULE BY MOUTH DAILY (Patient not taking: Reported on 9/17/2019) 30 capsule 11     No current facility-administered medications for this visit. Allergies   Allergen Reactions    Lisinopril Swelling    Ace Inhibitors Swelling    Influenza Vaccines      He states he was hospitalized when he received his first flu vaccine    Spiriva Handihaler [Tiotropium Bromide Monohydrate] Swelling     Tolerates both tudorza and incruse       Subjective:      Review of Systems   Constitutional: Negative. Negative for fever and weight loss. HENT: Negative for trouble swallowing. Eyes: Negative for photophobia. Respiratory: Negative. Cardiovascular: Negative. Negative for chest pain and syncope. Gastrointestinal: Negative for abdominal distention, abdominal pain, anal bleeding, blood in stool, constipation, diarrhea, nausea, rectal pain and vomiting. Heartburn     Musculoskeletal: Positive for arthralgias, myalgias, neck pain and neck stiffness. Negative for back pain, gait problem and joint swelling. Skin: Negative. Neurological: Positive for tingling, weakness (Right hand related to pain), numbness (RIght hand from neck ) and headaches.  Negative for dizziness, tremors, seizures, syncope, facial asymmetry, speech difficulty and light-headedness. Objective:     Vitals:    09/18/19 1329 09/18/19 1333   BP: (!) 148/58 (!) 144/60   Site: Left Upper Arm Left Upper Arm   Position: Sitting Sitting   Cuff Size: Medium Adult Medium Adult   Pulse: 83    Temp: 97.7 °F (36.5 °C)    SpO2: 93%    Weight: 209 lb (94.8 kg)    Height: 5' 7\" (1.702 m)      Wt Readings from Last 3 Encounters:   09/18/19 209 lb (94.8 kg)   09/17/19 207 lb 6.4 oz (94.1 kg)   07/15/19 214 lb (97.1 kg)     Temp Readings from Last 3 Encounters:   09/18/19 97.7 °F (36.5 °C)   09/17/19 98.2 °F (36.8 °C) (Oral)   06/29/19 98 °F (36.7 °C)     BP Readings from Last 3 Encounters:   09/18/19 (!) 144/60   09/17/19 128/67   07/29/19 139/73     Pulse Readings from Last 3 Encounters:   09/18/19 83   09/17/19 88   07/29/19 113     Physical Exam   Constitutional: He is oriented to person, place, and time. He appears well-developed and well-nourished. No distress. HENT:   Head: Normocephalic and atraumatic. Right Ear: External ear normal.   Left Ear: External ear normal.   Nose: Nose normal.   Eyes: Conjunctivae are normal. Right eye exhibits no discharge. Left eye exhibits no discharge. Neck: Normal range of motion. Neck supple. Cardiovascular: Normal rate. Pulmonary/Chest: Effort normal.   Musculoskeletal: He exhibits no deformity. Cervical back: He exhibits decreased range of motion, tenderness, pain and spasm. Positive Phalen's test bilaterally    Neurological: He is alert and oriented to person, place, and time. Skin: Skin is warm and dry. No rash noted. He is not diaphoretic. No erythema. No pallor. Psychiatric: He has a normal mood and affect. Judgment and thought content normal.   Nursing note and vitals reviewed. Office Visit on 07/09/2019   Component Date Value Ref Range Status    Hemoglobin A1C 07/09/2019 6.7  % Final           Assessment & Plan:      The following diagnoses and conditions are stable with no further orders

## 2019-09-19 ENCOUNTER — TELEPHONE (OUTPATIENT)
Dept: FAMILY MEDICINE CLINIC | Age: 64
End: 2019-09-19

## 2019-09-20 NOTE — TELEPHONE ENCOUNTER
Left detailed message that MRI should be performed at [facility] so our pre cert team will take care of the pre certification

## 2019-10-07 ENCOUNTER — HOSPITAL ENCOUNTER (OUTPATIENT)
Dept: MRI IMAGING | Age: 64
Discharge: HOME | End: 2019-10-07
Payer: COMMERCIAL

## 2019-10-07 ENCOUNTER — HOSPITAL ENCOUNTER (OUTPATIENT)
Dept: NEUROLOGY | Age: 64
Discharge: HOME | End: 2019-10-07
Payer: COMMERCIAL

## 2019-10-07 ENCOUNTER — TELEPHONE (OUTPATIENT)
Dept: FAMILY MEDICINE CLINIC | Age: 64
End: 2019-10-07

## 2019-10-07 DIAGNOSIS — R20.0 NUMBNESS AND TINGLING IN RIGHT HAND: ICD-10-CM

## 2019-10-07 DIAGNOSIS — M54.2 NECK PAIN: ICD-10-CM

## 2019-10-07 DIAGNOSIS — M47.812 CERVICAL SPINE ARTHRITIS: ICD-10-CM

## 2019-10-07 DIAGNOSIS — G56.00 CARPAL TUNNEL SYNDROME, UNSPECIFIED LATERALITY: Primary | ICD-10-CM

## 2019-10-07 DIAGNOSIS — R20.2 NUMBNESS AND TINGLING IN RIGHT HAND: ICD-10-CM

## 2019-10-07 PROCEDURE — 95910 NRV CNDJ TEST 7-8 STUDIES: CPT

## 2019-10-07 PROCEDURE — 72141 MRI NECK SPINE W/O DYE: CPT

## 2019-10-07 PROCEDURE — 95886 MUSC TEST DONE W/N TEST COMP: CPT

## 2019-10-14 ENCOUNTER — TELEPHONE (OUTPATIENT)
Dept: FAMILY MEDICINE CLINIC | Age: 64
End: 2019-10-14

## 2019-10-15 ENCOUNTER — HOSPITAL ENCOUNTER (EMERGENCY)
Age: 64
Discharge: HOME | End: 2019-10-15
Payer: COMMERCIAL

## 2019-10-15 VITALS
RESPIRATION RATE: 14 BRPM | OXYGEN SATURATION: 95 % | BODY MASS INDEX: 32.49 KG/M2 | HEIGHT: 67 IN | HEART RATE: 80 BPM | TEMPERATURE: 98.9 F | WEIGHT: 207 LBS | DIASTOLIC BLOOD PRESSURE: 73 MMHG | SYSTOLIC BLOOD PRESSURE: 146 MMHG

## 2019-10-15 DIAGNOSIS — M54.2 NECK PAIN, CHRONIC: ICD-10-CM

## 2019-10-15 DIAGNOSIS — G89.29 NECK PAIN, CHRONIC: ICD-10-CM

## 2019-10-15 DIAGNOSIS — M54.2 NECK PAIN ON RIGHT SIDE: Primary | ICD-10-CM

## 2019-10-15 PROCEDURE — 6370000000 HC RX 637 (ALT 250 FOR IP): Performed by: EMERGENCY MEDICINE

## 2019-10-15 PROCEDURE — 99282 EMERGENCY DEPT VISIT SF MDM: CPT

## 2019-10-15 PROCEDURE — 96372 THER/PROPH/DIAG INJ SC/IM: CPT

## 2019-10-15 PROCEDURE — 6360000002 HC RX W HCPCS: Performed by: EMERGENCY MEDICINE

## 2019-10-15 RX ORDER — LIDOCAINE 50 MG/G
1 PATCH TOPICAL EVERY 24 HOURS
Qty: 30 PATCH | Refills: 0 | Status: SHIPPED | OUTPATIENT
Start: 2019-10-15 | End: 2019-11-14

## 2019-10-15 RX ORDER — ORPHENADRINE CITRATE 30 MG/ML
60 INJECTION INTRAMUSCULAR; INTRAVENOUS ONCE
Status: COMPLETED | OUTPATIENT
Start: 2019-10-15 | End: 2019-10-15

## 2019-10-15 RX ORDER — METHOCARBAMOL 750 MG/1
750 TABLET, FILM COATED ORAL 4 TIMES DAILY PRN
Qty: 20 TABLET | Refills: 0 | Status: SHIPPED | OUTPATIENT
Start: 2019-10-15 | End: 2019-10-28 | Stop reason: SDUPTHER

## 2019-10-15 RX ORDER — KETOROLAC TROMETHAMINE 30 MG/ML
30 INJECTION, SOLUTION INTRAMUSCULAR; INTRAVENOUS ONCE
Status: COMPLETED | OUTPATIENT
Start: 2019-10-15 | End: 2019-10-15

## 2019-10-15 RX ORDER — NAPROXEN 500 MG/1
500 TABLET ORAL 2 TIMES DAILY PRN
Qty: 20 TABLET | Refills: 0 | Status: SHIPPED | OUTPATIENT
Start: 2019-10-15 | End: 2019-10-28 | Stop reason: SDUPTHER

## 2019-10-15 RX ORDER — LIDOCAINE 4 G/G
1 PATCH TOPICAL ONCE
Status: DISCONTINUED | OUTPATIENT
Start: 2019-10-15 | End: 2019-10-15 | Stop reason: HOSPADM

## 2019-10-15 RX ADMIN — ORPHENADRINE CITRATE 60 MG: 30 INJECTION INTRAMUSCULAR; INTRAVENOUS at 15:12

## 2019-10-15 RX ADMIN — KETOROLAC TROMETHAMINE 30 MG: 30 INJECTION, SOLUTION INTRAMUSCULAR at 15:13

## 2019-10-15 ASSESSMENT — PAIN DESCRIPTION - LOCATION: LOCATION: NECK

## 2019-10-15 ASSESSMENT — PAIN SCALES - GENERAL: PAINLEVEL_OUTOF10: 10

## 2019-10-15 ASSESSMENT — PAIN DESCRIPTION - PAIN TYPE: TYPE: ACUTE PAIN

## 2019-10-15 ASSESSMENT — PAIN DESCRIPTION - DESCRIPTORS: DESCRIPTORS: ACHING;SHARP

## 2019-10-28 ENCOUNTER — TELEPHONE (OUTPATIENT)
Dept: FAMILY MEDICINE CLINIC | Age: 64
End: 2019-10-28

## 2019-10-28 RX ORDER — NAPROXEN 500 MG/1
500 TABLET ORAL 2 TIMES DAILY PRN
Qty: 20 TABLET | Refills: 0 | Status: SHIPPED | OUTPATIENT
Start: 2019-10-28 | End: 2020-01-24

## 2019-10-28 RX ORDER — METHOCARBAMOL 750 MG/1
750 TABLET, FILM COATED ORAL 4 TIMES DAILY PRN
Qty: 20 TABLET | Refills: 0 | Status: SHIPPED | OUTPATIENT
Start: 2019-10-28 | End: 2019-11-04 | Stop reason: SDUPTHER

## 2019-10-28 RX ORDER — UMECLIDINIUM 62.5 UG/1
AEROSOL, POWDER ORAL
Qty: 30 EACH | Refills: 5 | Status: SHIPPED | OUTPATIENT
Start: 2019-10-28 | End: 2020-07-06

## 2019-10-31 ENCOUNTER — TELEPHONE (OUTPATIENT)
Dept: FAMILY MEDICINE CLINIC | Age: 64
End: 2019-10-31

## 2019-11-04 RX ORDER — METHOCARBAMOL 750 MG/1
750 TABLET, FILM COATED ORAL 4 TIMES DAILY PRN
Qty: 20 TABLET | Refills: 0 | Status: SHIPPED | OUTPATIENT
Start: 2019-11-04 | End: 2019-11-11 | Stop reason: SDUPTHER

## 2019-11-05 RX ORDER — UBIQUINOL 100 MG
CAPSULE ORAL
Qty: 100 EACH | Refills: 3 | Status: SHIPPED | OUTPATIENT
Start: 2019-11-05 | End: 2020-02-25

## 2019-11-07 ENCOUNTER — OFFICE VISIT (OUTPATIENT)
Dept: ORTHOPEDIC SURGERY | Age: 64
End: 2019-11-07
Payer: COMMERCIAL

## 2019-11-07 VITALS — HEIGHT: 67 IN | BODY MASS INDEX: 32.49 KG/M2 | WEIGHT: 207.01 LBS | RESPIRATION RATE: 14 BRPM

## 2019-11-07 DIAGNOSIS — M47.22 OSTEOARTHRITIS OF SPINE WITH RADICULOPATHY, CERVICAL REGION: Primary | ICD-10-CM

## 2019-11-07 PROCEDURE — G8484 FLU IMMUNIZE NO ADMIN: HCPCS | Performed by: ORTHOPAEDIC SURGERY

## 2019-11-07 PROCEDURE — 99214 OFFICE O/P EST MOD 30 MIN: CPT | Performed by: ORTHOPAEDIC SURGERY

## 2019-11-07 PROCEDURE — 3017F COLORECTAL CA SCREEN DOC REV: CPT | Performed by: ORTHOPAEDIC SURGERY

## 2019-11-07 PROCEDURE — 4004F PT TOBACCO SCREEN RCVD TLK: CPT | Performed by: ORTHOPAEDIC SURGERY

## 2019-11-07 PROCEDURE — G8427 DOCREV CUR MEDS BY ELIG CLIN: HCPCS | Performed by: ORTHOPAEDIC SURGERY

## 2019-11-07 PROCEDURE — G8417 CALC BMI ABV UP PARAM F/U: HCPCS | Performed by: ORTHOPAEDIC SURGERY

## 2019-11-11 RX ORDER — METHOCARBAMOL 750 MG/1
TABLET, FILM COATED ORAL
Qty: 20 TABLET | Refills: 0 | Status: SHIPPED | OUTPATIENT
Start: 2019-11-11 | End: 2021-05-18

## 2019-11-12 ENCOUNTER — TELEPHONE (OUTPATIENT)
Dept: ORTHOPEDIC SURGERY | Age: 64
End: 2019-11-12

## 2019-11-12 ENCOUNTER — OFFICE VISIT (OUTPATIENT)
Dept: FAMILY MEDICINE CLINIC | Age: 64
End: 2019-11-12
Payer: COMMERCIAL

## 2019-11-12 VITALS
DIASTOLIC BLOOD PRESSURE: 76 MMHG | OXYGEN SATURATION: 95 % | WEIGHT: 213 LBS | BODY MASS INDEX: 33.43 KG/M2 | SYSTOLIC BLOOD PRESSURE: 168 MMHG | HEIGHT: 67 IN | HEART RATE: 103 BPM

## 2019-11-12 DIAGNOSIS — I10 UNCONTROLLED HYPERTENSION: ICD-10-CM

## 2019-11-12 DIAGNOSIS — K70.30 ALCOHOLIC CIRRHOSIS OF LIVER WITHOUT ASCITES (HCC): ICD-10-CM

## 2019-11-12 DIAGNOSIS — I10 BENIGN ESSENTIAL HTN: ICD-10-CM

## 2019-11-12 DIAGNOSIS — K75.81 LIVER CIRRHOSIS SECONDARY TO NASH (HCC): Primary | ICD-10-CM

## 2019-11-12 DIAGNOSIS — K70.10 CHRONIC ALCOHOLIC HEPATITIS: ICD-10-CM

## 2019-11-12 DIAGNOSIS — C67.9 MALIGNANT NEOPLASM OF URINARY BLADDER, UNSPECIFIED SITE (HCC): ICD-10-CM

## 2019-11-12 DIAGNOSIS — Z72.0 TOBACCO ABUSE: ICD-10-CM

## 2019-11-12 DIAGNOSIS — G89.4 CHRONIC PAIN SYNDROME: ICD-10-CM

## 2019-11-12 DIAGNOSIS — E11.10 DIABETIC KETOACIDOSIS WITHOUT COMA ASSOCIATED WITH TYPE 2 DIABETES MELLITUS (HCC): ICD-10-CM

## 2019-11-12 DIAGNOSIS — J41.8 MIXED SIMPLE AND MUCOPURULENT CHRONIC BRONCHITIS (HCC): ICD-10-CM

## 2019-11-12 DIAGNOSIS — E78.2 MIXED HYPERLIPIDEMIA: ICD-10-CM

## 2019-11-12 DIAGNOSIS — K74.60 LIVER CIRRHOSIS SECONDARY TO NASH (HCC): Primary | ICD-10-CM

## 2019-11-12 PROCEDURE — 3044F HG A1C LEVEL LT 7.0%: CPT | Performed by: FAMILY MEDICINE

## 2019-11-12 PROCEDURE — 2022F DILAT RTA XM EVC RTNOPTHY: CPT | Performed by: FAMILY MEDICINE

## 2019-11-12 PROCEDURE — 3017F COLORECTAL CA SCREEN DOC REV: CPT | Performed by: FAMILY MEDICINE

## 2019-11-12 PROCEDURE — G8417 CALC BMI ABV UP PARAM F/U: HCPCS | Performed by: FAMILY MEDICINE

## 2019-11-12 PROCEDURE — 99214 OFFICE O/P EST MOD 30 MIN: CPT | Performed by: FAMILY MEDICINE

## 2019-11-12 PROCEDURE — 4004F PT TOBACCO SCREEN RCVD TLK: CPT | Performed by: FAMILY MEDICINE

## 2019-11-12 PROCEDURE — 3023F SPIROM DOC REV: CPT | Performed by: FAMILY MEDICINE

## 2019-11-12 PROCEDURE — G8427 DOCREV CUR MEDS BY ELIG CLIN: HCPCS | Performed by: FAMILY MEDICINE

## 2019-11-12 PROCEDURE — G8484 FLU IMMUNIZE NO ADMIN: HCPCS | Performed by: FAMILY MEDICINE

## 2019-11-12 PROCEDURE — G8926 SPIRO NO PERF OR DOC: HCPCS | Performed by: FAMILY MEDICINE

## 2019-11-12 PROCEDURE — 36415 COLL VENOUS BLD VENIPUNCTURE: CPT | Performed by: FAMILY MEDICINE

## 2019-11-12 RX ORDER — NAPROXEN 500 MG/1
500 TABLET ORAL 2 TIMES DAILY PRN
Qty: 20 TABLET | Refills: 0 | Status: CANCELLED | OUTPATIENT
Start: 2019-11-12

## 2019-11-12 RX ORDER — FOLIC ACID 1 MG/1
TABLET ORAL
Qty: 30 TABLET | Refills: 0 | Status: SHIPPED | OUTPATIENT
Start: 2019-11-12 | End: 2019-12-10 | Stop reason: SDUPTHER

## 2019-11-12 RX ORDER — METOPROLOL SUCCINATE 25 MG/1
25 TABLET, EXTENDED RELEASE ORAL DAILY
Qty: 30 TABLET | Refills: 5 | Status: SHIPPED | OUTPATIENT
Start: 2019-11-12 | End: 2020-01-24 | Stop reason: SDUPTHER

## 2019-11-13 LAB
A/G RATIO: 1.3 (ref 1.1–2.2)
ALBUMIN SERPL-MCNC: 4.7 G/DL (ref 3.4–5)
ALP BLD-CCNC: 132 U/L (ref 40–129)
ALT SERPL-CCNC: 18 U/L (ref 10–40)
ANION GAP SERPL CALCULATED.3IONS-SCNC: 15 MMOL/L (ref 3–16)
AST SERPL-CCNC: 23 U/L (ref 15–37)
BILIRUB SERPL-MCNC: <0.2 MG/DL (ref 0–1)
BUN BLDV-MCNC: 11 MG/DL (ref 7–20)
CALCIUM SERPL-MCNC: 10.1 MG/DL (ref 8.3–10.6)
CHLORIDE BLD-SCNC: 90 MMOL/L (ref 99–110)
CO2: 26 MMOL/L (ref 21–32)
CREAT SERPL-MCNC: 0.8 MG/DL (ref 0.8–1.3)
CREATININE URINE: 76.9 MG/DL (ref 39–259)
ESTIMATED AVERAGE GLUCOSE: 139.9 MG/DL
GFR AFRICAN AMERICAN: >60
GFR NON-AFRICAN AMERICAN: >60
GLOBULIN: 3.5 G/DL
GLUCOSE BLD-MCNC: 128 MG/DL (ref 70–99)
HBA1C MFR BLD: 6.5 %
HCT VFR BLD CALC: 43.3 % (ref 40.5–52.5)
HEMOGLOBIN: 14 G/DL (ref 13.5–17.5)
MCH RBC QN AUTO: 27.7 PG (ref 26–34)
MCHC RBC AUTO-ENTMCNC: 32.5 G/DL (ref 31–36)
MCV RBC AUTO: 85.3 FL (ref 80–100)
MICROALBUMIN UR-MCNC: 31.6 MG/DL
MICROALBUMIN/CREAT UR-RTO: 410.9 MG/G (ref 0–30)
PDW BLD-RTO: 16.6 % (ref 12.4–15.4)
PLATELET # BLD: 354 K/UL (ref 135–450)
PMV BLD AUTO: 8.2 FL (ref 5–10.5)
POTASSIUM SERPL-SCNC: 4.3 MMOL/L (ref 3.5–5.1)
RBC # BLD: 5.07 M/UL (ref 4.2–5.9)
SODIUM BLD-SCNC: 131 MMOL/L (ref 136–145)
TOTAL PROTEIN: 8.2 G/DL (ref 6.4–8.2)
WBC # BLD: 12.7 K/UL (ref 4–11)

## 2019-11-14 ENCOUNTER — TELEPHONE (OUTPATIENT)
Dept: ORTHOPEDIC SURGERY | Age: 64
End: 2019-11-14

## 2019-11-15 ENCOUNTER — TELEPHONE (OUTPATIENT)
Dept: ORTHOPEDIC SURGERY | Age: 64
End: 2019-11-15

## 2019-11-21 DIAGNOSIS — J41.8 MIXED SIMPLE AND MUCOPURULENT CHRONIC BRONCHITIS (HCC): ICD-10-CM

## 2019-11-21 RX ORDER — ALBUTEROL SULFATE 90 UG/1
AEROSOL, METERED RESPIRATORY (INHALATION)
Qty: 8.5 G | Refills: 0 | Status: SHIPPED | OUTPATIENT
Start: 2019-11-21 | End: 2019-12-12 | Stop reason: SDUPTHER

## 2019-12-03 ENCOUNTER — OFFICE VISIT (OUTPATIENT)
Dept: FAMILY MEDICINE CLINIC | Age: 64
End: 2019-12-03
Payer: COMMERCIAL

## 2019-12-03 VITALS
SYSTOLIC BLOOD PRESSURE: 130 MMHG | DIASTOLIC BLOOD PRESSURE: 68 MMHG | OXYGEN SATURATION: 93 % | WEIGHT: 217.2 LBS | HEART RATE: 73 BPM | BODY MASS INDEX: 34.09 KG/M2 | HEIGHT: 67 IN

## 2019-12-03 DIAGNOSIS — K59.04 CHRONIC IDIOPATHIC CONSTIPATION: ICD-10-CM

## 2019-12-03 DIAGNOSIS — Z01.818 PRE-OP EXAM: Primary | ICD-10-CM

## 2019-12-03 DIAGNOSIS — K70.10 CHRONIC ALCOHOLIC HEPATITIS: ICD-10-CM

## 2019-12-03 DIAGNOSIS — M1A.9XX0 CHRONIC GOUT INVOLVING TOE WITHOUT TOPHUS, UNSPECIFIED CAUSE, UNSPECIFIED LATERALITY: ICD-10-CM

## 2019-12-03 DIAGNOSIS — H25.013 CORTICAL AGE-RELATED CATARACT OF BOTH EYES: ICD-10-CM

## 2019-12-03 DIAGNOSIS — K74.60 LIVER CIRRHOSIS SECONDARY TO NASH (HCC): ICD-10-CM

## 2019-12-03 DIAGNOSIS — K70.30 ALCOHOLIC CIRRHOSIS OF LIVER WITHOUT ASCITES (HCC): ICD-10-CM

## 2019-12-03 DIAGNOSIS — K75.81 LIVER CIRRHOSIS SECONDARY TO NASH (HCC): ICD-10-CM

## 2019-12-03 DIAGNOSIS — J41.8 MIXED SIMPLE AND MUCOPURULENT CHRONIC BRONCHITIS (HCC): ICD-10-CM

## 2019-12-03 DIAGNOSIS — I10 BENIGN ESSENTIAL HTN: ICD-10-CM

## 2019-12-03 PROCEDURE — 3023F SPIROM DOC REV: CPT | Performed by: FAMILY MEDICINE

## 2019-12-03 PROCEDURE — G8484 FLU IMMUNIZE NO ADMIN: HCPCS | Performed by: FAMILY MEDICINE

## 2019-12-03 PROCEDURE — G8417 CALC BMI ABV UP PARAM F/U: HCPCS | Performed by: FAMILY MEDICINE

## 2019-12-03 PROCEDURE — G8428 CUR MEDS NOT DOCUMENT: HCPCS | Performed by: FAMILY MEDICINE

## 2019-12-03 PROCEDURE — G8926 SPIRO NO PERF OR DOC: HCPCS | Performed by: FAMILY MEDICINE

## 2019-12-03 PROCEDURE — 99245 OFF/OP CONSLTJ NEW/EST HI 55: CPT | Performed by: FAMILY MEDICINE

## 2019-12-03 PROCEDURE — 2022F DILAT RTA XM EVC RTNOPTHY: CPT | Performed by: FAMILY MEDICINE

## 2019-12-10 RX ORDER — FOLIC ACID 1 MG/1
TABLET ORAL
Qty: 30 TABLET | Refills: 0 | Status: SHIPPED | OUTPATIENT
Start: 2019-12-10 | End: 2019-12-31

## 2019-12-12 DIAGNOSIS — J41.8 MIXED SIMPLE AND MUCOPURULENT CHRONIC BRONCHITIS (HCC): ICD-10-CM

## 2019-12-12 RX ORDER — ALBUTEROL SULFATE 90 UG/1
AEROSOL, METERED RESPIRATORY (INHALATION)
Qty: 8.5 G | Refills: 0 | Status: SHIPPED | OUTPATIENT
Start: 2019-12-12 | End: 2019-12-31

## 2019-12-31 DIAGNOSIS — R03.0 ELEVATED BLOOD PRESSURE READING: ICD-10-CM

## 2019-12-31 DIAGNOSIS — I10 BENIGN ESSENTIAL HTN: ICD-10-CM

## 2019-12-31 DIAGNOSIS — J41.8 MIXED SIMPLE AND MUCOPURULENT CHRONIC BRONCHITIS (HCC): ICD-10-CM

## 2019-12-31 RX ORDER — DILTIAZEM HYDROCHLORIDE 360 MG/1
CAPSULE, EXTENDED RELEASE ORAL
Qty: 30 CAPSULE | Refills: 11 | Status: SHIPPED | OUTPATIENT
Start: 2019-12-31 | End: 2020-12-07

## 2019-12-31 RX ORDER — FOLIC ACID 1 MG/1
TABLET ORAL
Qty: 30 TABLET | Refills: 11 | Status: SHIPPED | OUTPATIENT
Start: 2019-12-31 | End: 2020-12-14

## 2019-12-31 RX ORDER — ALBUTEROL SULFATE 90 UG/1
AEROSOL, METERED RESPIRATORY (INHALATION)
Qty: 8.5 G | Refills: 11 | Status: SHIPPED | OUTPATIENT
Start: 2019-12-31 | End: 2020-12-17

## 2020-01-02 PROBLEM — Z01.818 PRE-OP EXAM: Status: RESOLVED | Noted: 2019-12-03 | Resolved: 2020-01-02

## 2020-01-07 RX ORDER — LANCETS 28 GAUGE
EACH MISCELLANEOUS
Qty: 100 EACH | Refills: 11 | Status: SHIPPED | OUTPATIENT
Start: 2020-01-07 | End: 2021-03-15

## 2020-01-20 RX ORDER — ALLOPURINOL 300 MG/1
TABLET ORAL
Qty: 30 TABLET | Refills: 11 | Status: SHIPPED | OUTPATIENT
Start: 2020-01-20 | End: 2020-12-29

## 2020-01-20 RX ORDER — ATORVASTATIN CALCIUM 40 MG/1
TABLET, FILM COATED ORAL
Qty: 30 TABLET | Refills: 11 | Status: SHIPPED | OUTPATIENT
Start: 2020-01-20 | End: 2020-12-22

## 2020-01-20 RX ORDER — THIAMINE MONONITRATE (VIT B1) 100 MG
TABLET ORAL
Qty: 30 TABLET | Refills: 11 | Status: SHIPPED | OUTPATIENT
Start: 2020-01-20 | End: 2020-12-22

## 2020-01-24 ENCOUNTER — TELEPHONE (OUTPATIENT)
Dept: FAMILY MEDICINE CLINIC | Age: 65
End: 2020-01-24

## 2020-01-24 ENCOUNTER — OFFICE VISIT (OUTPATIENT)
Dept: FAMILY MEDICINE CLINIC | Age: 65
End: 2020-01-24
Payer: COMMERCIAL

## 2020-01-24 VITALS
OXYGEN SATURATION: 98 % | BODY MASS INDEX: 34.8 KG/M2 | WEIGHT: 222.2 LBS | HEART RATE: 74 BPM | DIASTOLIC BLOOD PRESSURE: 66 MMHG | SYSTOLIC BLOOD PRESSURE: 150 MMHG

## 2020-01-24 PROBLEM — I73.9 CLAUDICATION OF CALF MUSCLES (HCC): Status: ACTIVE | Noted: 2020-01-24

## 2020-01-24 LAB — HBA1C MFR BLD: 6 %

## 2020-01-24 PROCEDURE — 2022F DILAT RTA XM EVC RTNOPTHY: CPT | Performed by: FAMILY MEDICINE

## 2020-01-24 PROCEDURE — 3044F HG A1C LEVEL LT 7.0%: CPT | Performed by: FAMILY MEDICINE

## 2020-01-24 PROCEDURE — 3017F COLORECTAL CA SCREEN DOC REV: CPT | Performed by: FAMILY MEDICINE

## 2020-01-24 PROCEDURE — 99214 OFFICE O/P EST MOD 30 MIN: CPT | Performed by: FAMILY MEDICINE

## 2020-01-24 PROCEDURE — 4004F PT TOBACCO SCREEN RCVD TLK: CPT | Performed by: FAMILY MEDICINE

## 2020-01-24 PROCEDURE — G8417 CALC BMI ABV UP PARAM F/U: HCPCS | Performed by: FAMILY MEDICINE

## 2020-01-24 PROCEDURE — G8484 FLU IMMUNIZE NO ADMIN: HCPCS | Performed by: FAMILY MEDICINE

## 2020-01-24 PROCEDURE — G8427 DOCREV CUR MEDS BY ELIG CLIN: HCPCS | Performed by: FAMILY MEDICINE

## 2020-01-24 PROCEDURE — 83036 HEMOGLOBIN GLYCOSYLATED A1C: CPT | Performed by: FAMILY MEDICINE

## 2020-01-24 RX ORDER — METOPROLOL SUCCINATE 50 MG/1
50 TABLET, EXTENDED RELEASE ORAL DAILY
Qty: 30 TABLET | Refills: 11 | Status: SHIPPED | OUTPATIENT
Start: 2020-01-24 | End: 2020-12-29

## 2020-01-24 RX ORDER — GABAPENTIN 300 MG/1
900 CAPSULE ORAL 3 TIMES DAILY
COMMUNITY
Start: 2020-01-20

## 2020-01-24 NOTE — PROGRESS NOTES
Sodium (mmol/L)   Date Value   11/12/2019 131 (L)    BUN (mg/dL)   Date Value   11/12/2019 11    Glucose (mg/dL)   Date Value   11/12/2019 128 (H)      Potassium (mmol/L)   Date Value   11/12/2019 4.3     Potassium reflex Magnesium (mmol/L)   Date Value   06/29/2019 3.9    CREATININE (mg/dL)   Date Value   11/12/2019 0.8           Patient's medications, allergies, past medical, surgical, social and family histories were reviewed and updated asappropriate on 1/24/2020 at 11:39 AM.    ROS:  Review of Systems    All other systems reviewed and are negative except as noted above on 1/24/2020 at 11:39 AM. Additional review of systems may be scanned into the media section ofthis medical record. Any responses requiring further intervention were pursued.     Hemoglobin A1C (%)   Date Value   11/12/2019 6.5     Microscopic Examination (no units)   Date Value   06/29/2019 YES     Microalbumin, Random Urine (mg/dL)   Date Value   11/12/2019 31.60 (H)     LDL Calculated (mg/dL)   Date Value   03/08/2019 106 (H)     Past Medical History:   Diagnosis Date    Bronchitis chronic     Chest pain     Cirrhosis of liver ([facility] [address] 01/01/2017    stage 4     COPD (chronic obstructive pulmonary disease) (HCC)     COPD (chronic obstructive pulmonary disease) (HCC)     Diabetes mellitus (HCC)     Gout     Hilar adenopathy     Hyperlipidemia     Hypertension     Knee pain, right     Numbness and tingling of leg     Osteoarthritis     Paresthesia of bilateral legs     Seizures (HCC)     ongoing, began after swine flu vaccination    Substance abuse ([facility] [address]         Family History   Problem Relation Age of Onset    Cancer Mother         Lung    Emphysema Father     Diabetes Sister     Hypertension Sister     Cancer Brother         throat    Asthma Neg Hx      Social History     Socioeconomic History    Marital status:      Spouse name: Not on file    Number of children: Not on file  Years of education: Not on file    Highest education level: Not on file   Occupational History    Occupation: [employer]     Comment: not working currently   Social Needs    Financial resource strain: Not on file    Food insecurity:     Worry: Not on file     Inability: Not on file   Opt[facility]iology needs:     Medical: Not on file     Non-medical: Not on file   Tobacco Use    Smoking status: Current Every Day Smoker     Packs/day: 0.50     Years: 54.00     Pack years: 27.00     Types: Cigarettes, Cigars     Start date: 9/4/2018    Smokeless tobacco: Former User     Types: Snuff    Tobacco comment: pt started smoking again in January 2019   Substance and Sexual Activity    Alcohol use: Yes     Alcohol/week: 2.0 standard drinks     Types: 2 Cans of beer per week     Comment: occ    Drug use: No    Sexual activity: Yes     Partners: Female   Lifestyle    Physical activity:     Days per week: Not on file     Minutes per session: Not on file    Stress: Not on file   Relationships    Social connections:     Talks on phone: Not on file     Gets together: Not on file     Attends Mosque service: Not on file     Active member of club or organization: Not on file     Attends meetings of clubs or organizations: Not on file     Relationship status: Not on file    Intimate partner violence:     Fear of current or ex partner: Not on file     Emotionally abused: Not on file     Physically abused: Not on file     Forced sexual activity: Not on file   Other Topics Concern    Not on file   Social History Narrative    Not on file       Prior to Visit Medications    Medication Sig Taking? Authorizing Provider   gabapentin (NEURONTIN) 300 MG capsule 900 mg 3 times daily.  Yes Historical Provider, MD   vitamin B-1 (THIAMINE) 100 MG tablet TAKE 1 TABLET BY MOUTH DAILY Yes Jhonnie Litten, MD   atorvastatin (LIPITOR) 40 MG tablet TAKE 1 TABLET BY MOUTH DAILY Yes Jhonnie Litten, MD   allopurinol (ZYLOPRIM) 300 MG tablet TAKE 1 TABLET BY MOUTH DAILY Yes MD JS Langley MISC TEST 2 TIMES DAILY Yes Curtis Georges MD   albuterol sulfate  (90 Base) MCG/ACT inhaler INHALE 2 PUFFS EVERY 6 HOURS AS NEEDED FOR WHEEZING Yes Curtis Georges MD   diltiazem DE LA O [facility]) 360 MG extended release capsule TAKE 1 CAPSULE BY MOUTH DAILY Yes Curtis Georges MD   folic acid (FOLVITE) 1 MG tablet TAKE 1 TABLET BY MOUTH DAILY Yes Curtis Georges MD   metoprolol succinate (TOPROL XL) 25 MG extended release tablet Take 1 tablet by mouth daily Yes Curtis Georges MD   methocarbamol (ROBAXIN) 750 MG tablet TAKE ONE TABLET BY MOUTH FOUR TIMES A DAY AS NEEDED FOR MUSCLE SPASMS Yes Curtis Georges MD   Alcohol Swabs (ALCOHOL PREP) 70 % PADS USE AS DIRECTED Yes Curtis Georges MD   INCRUSE ELLIPTA 62.5 MCG/INH AEPB INHALE 1 PUFF DAILY Yes Curtis Georges MD   naproxen (NAPROSYN) 500 MG tablet Take 1 tablet by mouth 2 times daily as needed for Pain (with meals) Yes Curtis Georges MD   omeprazole (PRILOSEC) 40 MG delayed release capsule TAKE 1 CAPSULE BY MOUTH DAILY Yes NAYELY Peterson CNP   ondansetron (ZOFRAN) 4 MG tablet TAKE ONE TABLET BY MOUTH EVERY 8 HOURS AS NEEDED FOR NAUSEA OR VOMITING Yes Curtis Georges MD   Blood Pressure Monitoring (BLOOD PRESSURE CUFF) MISC Please dispense insurance preference Yes Curtis Georges MD   metFORMIN (GLUCOPHAGE) 500 MG tablet TAKE ONE TABLET BY MOUTH 2 TIMES DAILY WITH MEALS Yes Curtis Georges MD   spironolactone (ALDACTONE) 50 MG tablet TAKE 1 TABLET BY MOUTH TWICE DAILY Yes Curtis Georges MD   budesonide (PULMICORT) 0.5 MG/2ML nebulizer suspension Take 2 mLs by nebulization 2 times daily Yes Curtis Georges MD   BASAGLAR KWIKPEN 100 UNIT/ML injection pen INJECT 25 UNITS INTO THE SKIN DAILY WITH SUPPER Yes Dangelo Scruggs MD   UNIFINE PENTIPS 31G X 8 MM MISC USE ONCE DAILY WITH INJECTION Yes Dangelo Scruggs MD   Blood Glucose Monitoring Suppl (FREESTYLE FREEDOM LITE) w/Device KIT 1 kit by Does not apply route daily Yes NAYELY Purdy CNP   blood glucose test strips (EXACTECH TEST) strip 1 each by In Vitro route 2 times daily As needed. Yes NAYELY Purdy CNP   oxyCODONE (ROXICODONE) 5 MG immediate release tablet every 6 hours as needed. . Yes Historical Provider, MD   OXYGEN Inhale 2 L/min into the lungs nightly as needed. Yes Historical Provider, MD     Allergies   Allergen Reactions    Lisinopril Swelling    Ace Inhibitors Swelling    Influenza Vaccines      He states he was hospitalized when he received his first flu vaccine    Spiriva Handihaler [Tiotropium Bromide Monohydrate] Swelling     Tolerates both tudorza and incruse       OBJECTIVE:  Estimated body mass index is 34.8 kg/m² as calculated from the following:    Height as of 12/3/19: 5' 7\" (1.702 m). Weight as of this encounter: 222 lb 3.2 oz (100.8 kg). Vitals:    01/24/20 1131   BP: (!) 148/60   Site: Left Upper Arm   Position: Sitting   Cuff Size: Large Adult   Pulse: 74   SpO2: 98%   Weight: 222 lb 3.2 oz (100.8 kg)     BP Readings from Last 2 Encounters:   01/24/20 (!) 148/60   12/03/19 130/68     Wt Readings from Last 3 Encounters:   01/24/20 222 lb 3.2 oz (100.8 kg)   12/03/19 217 lb 3.2 oz (98.5 kg)   11/12/19 213 lb (96.6 kg)       Physical Exam  Vitals signs and nursing note reviewed. Constitutional:       General: He is not in acute distress. Appearance: He is well-developed. He is not diaphoretic. HENT:      Head: Normocephalic and atraumatic. Right Ear: External ear normal.      Left Ear: External ear normal.      Nose: Nose normal.   Eyes:      General: Lids are normal. No scleral icterus. Right eye: No discharge. Left eye: No discharge.       Pupils: Pupils are equal, round, and reactive to light. Neck:      Thyroid: No thyromegaly. Vascular: No JVD. Cardiovascular:      Rate and Rhythm: Normal rate and regular rhythm. Pulses:           Dorsalis pedis pulses are 1+ on the right side and 1+ on the left side. Posterior tibial pulses are 1+ on the right side and 1+ on the left side. Heart sounds: Normal heart sounds. Pulmonary:      Effort: Pulmonary effort is normal. No respiratory distress. Breath sounds: Normal breath sounds. Comments: Diminished breath sounds throughout. Abdominal:      Palpations: Abdomen is soft. There is no hepatomegaly or splenomegaly. Tenderness: There is no tenderness. Skin:     General: Skin is warm and dry. Coloration: Skin is not pale. Findings: No erythema or rash. Comments: Turgor normal   Psychiatric:         Behavior: Behavior normal.         Thought Content: Thought content normal.         Judgment: Judgment normal.       Results for POC orders placed in visit on 01/24/20   POCT glycosylated hemoglobin (Hb A1C)   Result Value Ref Range    Hemoglobin A1C 6.0 %            ASSESSMENT PLAN      Diagnosis Orders   1. Uncontrolled hypertension     2. Insulin dependent type 2 diabetes mellitus, uncontrolled (HCC)  POCT glycosylated hemoglobin (Hb A1C)   3. Benign essential HTN     4. Diabetic ketoacidosis without coma associated with type 2 diabetes mellitus ([facility] [address]     5. Claudication of both lower extremities (HCC)     6. Claudication of calf muscles (HCC) intermittent     Blood pressure is still not adequately controlled. We will double the metoprolol XL up to 50 mg daily. We have reduce his Basaglar from 70 units to 55 units because his A1c is now 6.0. Continue metformin. He states that pharmacy has told him we will need to change to a different basal insulin because of insurance reasons. I have asked him to have the pharmacy let us know what that will be when it is due to reorder. His symptoms sound possibly to be claudication related to vascular rather than pseudoclaudication. Will check arterial Doppler studies. He will follow-up again in 4 months. Patient should call the office immediately with new or ongoing signs or symptoms or worsening, or proceed to the emergency room. No changes in past medical history, past surgical history, social history, or family history were noted during the patient encounter unless specifically listed above. All updates of past medical history, past surgical history, social history, or family history were reviewed personally by me during the office visit. All problems listed in the assessment are stable unless noted otherwise. Medication profile reviewed personally by me during the visit. Medication side effects and possible impairments from medications were discussed as applicable. This document was prepared by a combination of typing and transcription through a voice recognition software. Scribe attestation: Caesar Freed RN, am scribing for and in the presence of Felipe Brambila MD. Electronically signed by Rose Costa RN on 1/24/2020 at 11:39 AM      Provider attestation:     I, Dr. Raymond Schwab, personally performed the services described in this documentation, as scribed by the above signed scribe in my presence, and it is both accurate and complete. I agree with the ROS and Past Histories independently gathered by the clinical support staff and the remaining scribed note accurately describes my personal service to the patient.       1/24/2020    1:44 PM

## 2020-01-24 NOTE — TELEPHONE ENCOUNTER
He was using Basaglar 25 units with supper and you said to decrease to 15 units so I adjusted the sig.   Please advise

## 2020-01-24 NOTE — PATIENT INSTRUCTIONS
Decrease your Basaglar insulin to 15 units daily. Increase your metoprolol 25 mg to 50 mg daily, new RX will be sent to the pharmacy. Patient should call the office immediately with new or ongoing signs or symptoms or worsening, or proceed to the emergency room. If you are on medications which could impair your senses, you are at risk of weakness, falls, dizziness, or drowsiness. You should be careful during activities which could place you at risk of harm, such as climbing, using stairs, operating machinery, or driving vehicles. If you feel you cannot safely do these activities, you should request others to help you, or avoid the activities altogether. If you are drowsy for any other reason, you should use the same precautions as listed above. You may receive a survey regarding the care you received during your visit. Your input is valuable to us. We encourage you to complete and return your survey.

## 2020-01-28 ENCOUNTER — TELEPHONE (OUTPATIENT)
Dept: GASTROENTEROLOGY | Age: 65
End: 2020-01-28

## 2020-01-28 ENCOUNTER — OFFICE VISIT (OUTPATIENT)
Dept: GASTROENTEROLOGY | Age: 65
End: 2020-01-28
Payer: COMMERCIAL

## 2020-01-28 VITALS
DIASTOLIC BLOOD PRESSURE: 78 MMHG | SYSTOLIC BLOOD PRESSURE: 138 MMHG | HEIGHT: 67 IN | WEIGHT: 221 LBS | BODY MASS INDEX: 34.69 KG/M2

## 2020-01-28 PROCEDURE — 99213 OFFICE O/P EST LOW 20 MIN: CPT | Performed by: INTERNAL MEDICINE

## 2020-01-28 PROCEDURE — G8427 DOCREV CUR MEDS BY ELIG CLIN: HCPCS | Performed by: INTERNAL MEDICINE

## 2020-01-28 PROCEDURE — 3017F COLORECTAL CA SCREEN DOC REV: CPT | Performed by: INTERNAL MEDICINE

## 2020-01-28 PROCEDURE — 4004F PT TOBACCO SCREEN RCVD TLK: CPT | Performed by: INTERNAL MEDICINE

## 2020-01-28 PROCEDURE — G8417 CALC BMI ABV UP PARAM F/U: HCPCS | Performed by: INTERNAL MEDICINE

## 2020-01-28 PROCEDURE — G8484 FLU IMMUNIZE NO ADMIN: HCPCS | Performed by: INTERNAL MEDICINE

## 2020-01-28 NOTE — PROGRESS NOTES
transaminases, inr, plt count, albumin.  Alk phos is mildly elevated.  Again confirms he is no longer drinking.     Last Encounter Reviewed: 2/11/2019  Theresa Early  Underwent liver biopsy in August confirming cirrhosis.  Had a mild iron deficiency at time of endoscopic workup.  Complains of 1 week of RLQ abdominal tenderness.     \"Overall, the liver biopsy is notable for cirrhosis. The portal areas  show minimal-to-mild, mixed portal inflammation and bile duct damage,  without definitive florid duct lesions or granulomas seen (the laboratory  finding of elevated AMA and WADE is noted).  These findings suggest a  portal based injury, likely biliary, such as large duct obstruction,  drug/medication/herbal-induced biliary-type injury or infection involving  the biliary tree, among other potential biliary etiologies (primary  biliary cirrhosis, primary sclerosing cholangitis).  Steatosis with  ballooning degeneration of hepatocytes and rare Yennifer-Denk bodies are  seen, raising the possibility of a concurrent steatohepatitic injury  resulting from a dietary/metabolic cause, or  drug/medication/herbal/alcohol intake.  Correlation with clinical history  and laboratory findings is recommended. \"     Last Encounter Reviewed: 7/12/2018 Leilani Early  Complains of rectal bleeding and pain present 1 week, severe worse with constipation which has been present for at least the past year shortly after being started on narcotics unrelieved with miralax and side effects with lactulose, no other associated signs/symptoms. Brother just diagnosed with rectal cancer in his 46s.  Has a significant drinking history and still drinks 2-3 beers/d despite CT in February showing cirrhosis.  Denies being told he has cirrhosis. Pertinent PMH, FH, SH is reviewed below. Last EGD: 7/23/2018 erosive gastritis, grade 1 varices  Last Colonoscopy: 7/23/2018 3 small adenomas, diverticulosis    Review of available records reveals:   Wt Readings from Last 50 Encounters:   01/28/20 221 lb (100.2 kg)   01/24/20 222 lb 3.2 oz (100.8 kg)   12/03/19 217 lb 3.2 oz (98.5 kg)   11/12/19 213 lb (96.6 kg)   11/07/19 207 lb 0.2 oz (93.9 kg)   10/15/19 207 lb (93.9 kg)   09/18/19 209 lb (94.8 kg)   09/17/19 207 lb 6.4 oz (94.1 kg)   07/15/19 214 lb (97.1 kg)   07/09/19 220 lb 9.6 oz (100.1 kg)   06/29/19 220 lb (99.8 kg)   04/11/19 218 lb (98.9 kg)   03/08/19 221 lb 6.4 oz (100.4 kg)   02/26/19 224 lb (101.6 kg)   02/11/19 224 lb (101.6 kg)   02/04/19 225 lb 3.2 oz (102.2 kg)   11/02/18 225 lb (102.1 kg)   08/28/18 224 lb 6.4 oz (101.8 kg)   08/02/18 220 lb (99.8 kg)   07/23/18 220 lb (99.8 kg)   07/12/18 220 lb (99.8 kg)   06/25/18 201 lb 9.6 oz (91.4 kg)   04/06/18 202 lb 13.2 oz (92 kg)   03/23/18 202 lb 12.8 oz (92 kg)   03/20/18 203 lb 0.7 oz (92.1 kg)   02/27/18 203 lb (92.1 kg)   02/23/18 203 lb 6.4 oz (92.3 kg)   02/22/18 220 lb (99.8 kg)   02/21/18 207 lb 1.6 oz (93.9 kg)   02/02/18 226 lb (102.5 kg)   01/18/18 220 lb (99.8 kg)   11/28/17 223 lb 15.8 oz (101.6 kg)   10/13/17 223 lb 15.8 oz (101.6 kg)   10/03/17 224 lb (101.6 kg)   08/29/17 224 lb 9.6 oz (101.9 kg)   03/03/17 230 lb (104.3 kg)   02/11/17 225 lb (102.1 kg)   12/02/16 233 lb 9.6 oz (106 kg)   08/30/16 221 lb 9.6 oz (100.5 kg)   07/10/16 227 lb (103 kg)   05/10/16 223 lb 9.6 oz (101.4 kg)   02/15/16 226 lb (102.5 kg)   02/09/16 226 lb 6.4 oz (102.7 kg)   11/09/15 221 lb 4.8 oz (100.4 kg)   09/04/15 215 lb (97.5 kg)   09/03/15 215 lb 9.6 oz (97.8 kg)   08/04/15 213 lb 12.8 oz (97 kg)   05/01/15 220 lb (99.8 kg)   05/04/15 213 lb (96.6 kg)   02/04/15 214 lb (97.1 kg)       No components found for: HGBA1C  BP Readings from Last 3 Encounters:   01/28/20 138/78   01/24/20 (!) 150/66   12/03/19 130/68     Health Maintenance   Topic Date Due    Hepatitis A vaccine (1 of 2 - Risk 2-dose series) 10/14/1956    Pneumococcal 0-64 years Vaccine (1 of 1 - PPSV23) 10/14/1962    DTaP/Tdap/Td vaccine (1 - Tdap) 10/14/1967    Hepatitis B vaccine (1 of 3 - Risk 3-dose series) 10/14/1975    Shingles Vaccine (1 of 2) 10/14/2006    Lipid screen  03/08/2020    Diabetic foot exam  07/09/2020    Diabetic microalbuminuria test  11/12/2020    Potassium monitoring  11/12/2020    Creatinine monitoring  11/12/2020    A1C test (Diabetic or Prediabetic)  01/24/2021    Colon cancer screen colonoscopy  07/23/2021    Diabetic retinal exam  11/02/2021    Hepatitis C screen  Completed    HIV screen  Completed       No components found for: [employer]asso     PAST MEDICAL HISTORY     Past Medical History:   Diagnosis Date    Bronchitis chronic     Chest pain     Cirrhosis of liver ([facility] [address] 01/01/2017    stage 4     COPD (chronic obstructive pulmonary disease) (HCC)     COPD (chronic obstructive pulmonary disease) (HCC)     Diabetes mellitus (HCC)     Gout     Hilar adenopathy     Hyperlipidemia     Hypertension     Knee pain, right     Numbness and tingling of leg     Osteoarthritis     Paresthesia of bilateral legs     Seizures (HCC)     ongoing, began after swine flu vaccination    Substance abuse ([facility] [address]      FAMILY HISTORY     Family History   Problem Relation Age of Onset   Shearon August Cancer Mother         Lung    Emphysema Father     Diabetes Sister     Hypertension Sister     Cancer Brother         throat    Asthma Neg Hx      SOCIAL HISTORY     Social History     Socioeconomic History    Marital status:      Spouse name: Not on file    Number of children: Not on file    Years of education: Not on file    Highest education level: Not on file   Occupational History    Occupation: [employer]     Comment: not working currently   Social Needs    Financial resource strain: Not on file    Food insecurity:     Worry: Not on file     Inability: Not on file   [employer] needs:     Medical: Not on file     Non-medical: Not on file   Tobacco Use    Smoking status: Current Every Day Smoker     Packs/day: 0.50  UPPER GASTROINTESTINAL ENDOSCOPY N/A 7/23/2018    EGD BIOPSY performed by Matilda Adan MD at [address] [place] [address]   (This list may include medications prescribed during this encounter as epic can not insert only the list prior to this encounter.)  Current Outpatient Rx   Medication Sig Dispense Refill    gabapentin (NEURONTIN) 300 MG capsule 900 mg 3 times daily.       metoprolol succinate (TOPROL XL) 50 MG extended release tablet Take 1 tablet by mouth daily 30 tablet 11    insulin glargine (BASAGLAR KWIKPEN) 100 UNIT/ML injection pen INJECT 15 UNITS INTO THE SKIN DAILY WITH SUPPER 6 mL 5    vitamin B-1 (THIAMINE) 100 MG tablet TAKE 1 TABLET BY MOUTH DAILY 30 tablet 11    atorvastatin (LIPITOR) 40 MG tablet TAKE 1 TABLET BY MOUTH DAILY 30 tablet 11    allopurinol (ZYLOPRIM) 300 MG tablet TAKE 1 TABLET BY MOUTH DAILY 30 tablet 11    FREESTYLE LANCETS MISC TEST 2 TIMES DAILY 100 each 11    albuterol sulfate  (90 Base) MCG/ACT inhaler INHALE 2 PUFFS EVERY 6 HOURS AS NEEDED FOR WHEEZING 8.5 g 11    diltiazem (TIAZAC) 360 MG extended release capsule TAKE 1 CAPSULE BY MOUTH DAILY 30 capsule 11    folic acid (FOLVITE) 1 MG tablet TAKE 1 TABLET BY MOUTH DAILY 30 tablet 11    methocarbamol (ROBAXIN) 750 MG tablet TAKE ONE TABLET BY MOUTH FOUR TIMES A DAY AS NEEDED FOR MUSCLE SPASMS 20 tablet 0    Alcohol Swabs (ALCOHOL PREP) 70 % PADS USE AS DIRECTED 100 each 3    INCRUSE ELLIPTA 62.5 MCG/INH AEPB INHALE 1 PUFF DAILY 30 each 5    omeprazole (PRILOSEC) 40 MG delayed release capsule TAKE 1 CAPSULE BY MOUTH DAILY 30 capsule 5    ondansetron (ZOFRAN) 4 MG tablet TAKE ONE TABLET BY MOUTH EVERY 8 HOURS AS NEEDED FOR NAUSEA OR VOMITING 20 tablet 0    Blood Pressure Monitoring (BLOOD PRESSURE CUFF) MISC Please dispense insurance preference 1 each 0    metFORMIN (GLUCOPHAGE) 500 MG tablet TAKE ONE TABLET BY MOUTH 2 TIMES DAILY WITH MEALS 60 tablet 11    spironolactone (ALDACTONE) 50 MG tablet TAKE 1 TABLET BY MOUTH TWICE DAILY 60 tablet 11    budesonide (PULMICORT) 0.5 MG/2ML nebulizer suspension Take 2 mLs by nebulization 2 times daily 60 ampule 11    UNIFINE PENTIPS 31G X 8 MM MISC USE ONCE DAILY WITH INJECTION 100 each 5    Blood Glucose Monitoring Suppl (FREESTYLE FREEDOM LITE) w/Device KIT 1 kit by Does not apply route daily 1 kit 0    blood glucose test strips (EXACTECH TEST) strip 1 each by In Vitro route 2 times daily As needed. 100 each 11    oxyCODONE (ROXICODONE) 5 MG immediate release tablet every 6 hours as needed. [facility] OXYGEN Inhale 2 L/min into the lungs nightly as needed. ALLERGIES     Allergies   Allergen Reactions    Lisinopril Swelling    Ace Inhibitors Swelling    Influenza Vaccines      He states he was hospitalized when he received his first flu vaccine    Spiriva Handihaler [Tiotropium Bromide Monohydrate] Swelling     Tolerates both tudorza and incruse     IMMUNIZATIONS     Immunization History   Administered Date(s) Administered    Td, unspecified formulation 11/03/2014     REVIEW OF SYSTEMS   See HPI for further details and pertinent postiives. Negative for the following:  Constitutional: Negative for weight change. Negative for appetite change and fatigue. HENT: Negative for nosebleeds, sore throat, mouth sores, and voice change. Respiratory: Negative for cough, choking and chest tightness. Cardiovascular: Negative for chest pain   Gastrointestinal: See HPI  Musculoskeletal: Negative for arthralgias. Skin: Negative for pallor. Neurological: Negative for weakness and light-headedness. Hematological: Negative for adenopathy. Does not bruise/bleed easily. Psychiatric/Behavioral: Negative for suicidal ideas.    PHYSICAL EXAM   VITAL SIGNS: /78 (Site: Left Upper Arm)   Ht 5' 7\" (1.702 m)   Wt 221 lb (100.2 kg)   BMI 34.61 kg/m²   Wt Readings from Last 3 Encounters:   01/28/20 221 lb (100.2 kg)   01/24/20 222 lb 3.2 oz (100.8 kg)   12/03/19 217 lb 3.2 oz (98.5 kg)     Constitutional: Well developed, Well nourished, No acute distress, Non-toxic appearance. HENT: Normocephalic, Atraumatic, Bilateral external ears normal, Oropharynx moist, No oral exudates, Nose normal.   Eyes: Conjunctiva normal, No discharge. Neck: Normal range of motion, No tenderness, Supple, No stridor. Lymphatic: No cervical, subclavian, or axillary lymphadenopathy. Cardiovascular: Normal heart rate, Normal rhythm, No murmurs, No rubs, No gallops. Thorax & Lungs: Normal breath sounds, No respiratory distress, No wheezing, No chest tenderness. No gynecomastia. Abdomen: scars consistent with stated surgeries, no hernias, no HSM, soft NTND   Rectal:  Deferred. Skin: Warm, Dry, No erythema, No rash. No bruising. No spider hemangiomas. Back: No tenderness, No CVA tenderness. Lower Extremities: Intact distal pulses, No edema, No tenderness, No cyanosis, No clubbing. Neurologic: Alert & oriented x 3, Normal motor function, Normal sensory function, No focal deficits noted. No asterixis. RADIOLOGY/PROCEDURES       FINAL IMPRESSION     Orders Placed This Encounter   Procedures    US Gallbladder Ruq     Standing Status:   Future     Standing Expiration Date:   2/28/2020    EGD     Standing Status:   Future     Standing Expiration Date:   1/28/2021     Scheduling Instructions:      Schedule with anesthesia provided diprivan sedation. Please provide prep of choice instructions and prescription. General guidelines for holding blood thinners/anticoagulants around endoscopic procedure are but patients are encouraged to check with their prescribing physician. The patient may hold Plavix, Effient, Brilinta 5 days prior to the procedure unless:       A drug eluting stent has been placed within past 12 months. A nondrug eluting stent has been placed within past 1 month.       Coumadin may be held 4 days prior to the procedure unless:        Mechanical mitral valve replacement (requires heparin bridge while Coumadin held and is managed by pharmacy)      Pradaxa, Xarelto, Eliquis may be held 2-3 days prior to procedure. According to pharmacokinetics of the drug, package insert, cardiology practice patterns, and T1/2 of theses drugs (12 hrs), Eliquis and Xarelto are held 48hrs prior to any procedure, including major surgical procedures w/o       increased bleeding.  That is usually the standard of care, as coagulation would/should be normalized at 48hrs. Every attempt should be made to maintain ASA 81mg per day throughout the radha-operative period in patients with diagnosis of ASHD. These recommendations may need to be modified by the provider/ based on risk /benefit analysis of the procedure and the patients history. If anticoagulation can not be held because recent cardiac stent, elective endoscopic procedures should be delayed until they have received the minimum duration of recommended antiplatlet therapy and it can safely be held. Again if unsure, patient should discuss with prescribing physician/service. If anticoagulation can not be stopped, endoscopic procedures can still be performed either diagnostically at a somewhat higher risk. Understand that any therapeutic procedure where anything beyond looking is performed, carries higher risks. For this reason without overt bleeding other testing       such as cologuard may be more appropriate. High risk endoscopic procedures that require stopping antiplatelet and anticoagulation therapy include polypectomy, biliary or pancreatic sphincterotomy, pneumatic or bougie dilation, PEG placement, therapeutic balloon-assisted enteroscopy, EUS and FNA, tumor ablation by any technique,       cystogastrostomy,and treatment of varices. Order Specific Question:   Screening or Diagnostic?      Answer:   Jessica Nolasco was seen

## 2020-01-28 NOTE — PATIENT INSTRUCTIONS
[place] [address] ,  [address] [facility]  Phone: [phone]  [facility],  [address] [place] [address], [address] [place] [address]  Phone: [phone]    Sedation  Three types of sedation are used for endoscopy and colonoscopy. The standard and most common is called conscious sedation. This is administered by the gastroenterologist and is part of the standard procedure. Common medications used for this are IV forms of a benzodiazepine (most commonly Versed) and a narcotic (most commonly fentanyl). Benadryl and nausea medicines may also be used. The effect of this is to make you comfortable. Most people will actually have amnesia with this and not recall the procedure. Some individuals will have other types of anesthesia provided by an anesthesiologist or nurse anesthetist.  The reason for this is a history of poor sedation, medication use that makes one more resistant to conscious sedation, a medical condition for which conscious sedation is contraindicated or other medical unstable conditions. This usually involves a separate fee from anesthesia. The most common of these is propofol (diprivan sedation) which is a deeper sedative the conscious sedation. In some instances, general anesthesia with intubation (breathing tube) is required. ENDOSCOPY OVERVIEW  An upper endoscopy, often referred to as endoscopy, EGD, or [id number], is a procedure that allows a physician to directly examine the upper part of the gastrointestinal (GI) tract, which includes the esophagus (swallowing tube), the stomach, and the duodenum (the first section of the small intestine)  The physician who performs the procedures, known as an endoscopist, has special training in using an endoscope to examine the upper GI system, looking for inflammation (redness, irritation), bleeding, ulcers, or tumors.     REASONS FOR UPPER ENDOSCOPY  The most common reasons for upper endoscopy include:  Unexplained discomfort in the upper abdomen   GERD or gastroesophageal reflux disease, (often called heartburn)   Persistent nausea and vomiting   Upper GI bleeding (vomiting blood or blood found in the stool that originated from the upper part of the gastrointestinal tract). Bleeding can be treated during the endoscopy. Difficulty swallowing; food/liquids getting stuck in the esophagus during swallowing. This may be caused by a narrowing (stricture) or tumor. The stricture may be dilated with special balloons or dilation tubes during the endoscopy. Abnormal or unclear findings on an upper GI x-ray, CT scan or MRI. Removal of a foreign body (a swallowed object). To check healing or progress on previously found polyps (growths), tumors, or ulcers. ENDOSCOPY PREPARATION  You will be given specific instructions regarding how to prepare for the examination before the procedure. These instructions are designed to maximize your safety during and after the examination and to minimize possible complications. It is important to read the instructions ahead of time and follow them carefully. Do not hesitate to call the physician's office or the endoscopy unit if there are questions. Nothing to eat after midnight the day before the test. You may be asked not to eat or drink anything for up to eight hours before the test. It is important for your stomach to be empty to allow the endoscopist to visualize the entire area and to decrease the possibility of food or fluid being vomited into the lungs while under sedation (called aspiration). You may be asked to adjust the dose of your medications or to stop specific medications (such as aspirin-like drugs) temporarily before the examination. You should discuss your medications with your physician before your appointment for the endoscopy. You should arrange for a friend or family member to escort you home after the examination. Although you will be awake by the time you are discharged, the medications used for sedation cause temporary changes in the reflexes and judgment and interfere with your ability to drive or make decisions (similar to the effects of alcohol). WHAT TO EXPECT DURING ENDOSCOPY  Prior to the endoscopy, the staff will review your medical and surgical history, including current medications. A physician will explain the procedure and ask you to sign a consent. Before signing the consent, you should understand all the benefits and risks of the procedure, and should have all of your questions answered. An intravenous line (a needle inserted into a vein in the hand or arm) will be started to deliver medications. You will be given a combination of a sedative (to help you relax), and a narcotic (to prevent discomfort). Although most patients are sedated for the examination, many tolerate the procedure well without any medication. Your vital signs (blood pressure, heart rate, and blood oxygen level) will be monitored before, during, and after the examination. The monitoring is not painful. Oxygen is often given during the procedure through a small tube that sits under the nose and is fitted around the ears. For safety reasons, dentures should be removed before the procedure. THE ENDOSCOPY PROCEDURE  The procedure typically takes between 10 and 20 minutes to complete. The endoscopy is performed while you lie on your left side. Sometimes the physician will give a medication to numb the throat (either a gargle or a spray). A plastic mouth guard is placed between the teeth to prevent damage to the teeth and scope. The endoscope (also called a gastroscope) is a flexible tube that is about the size of a finger. The scope has a lens and a light source that allows the endoscopist to look into the scope to see the inner lining of the upper gastrointestinal tract, or to view it on a TV monitor.  Most people have no difficulty swallowing the flexible gastroscope as a result of the sedating medications. Many people sleep during the test; others are very relaxed and generally not aware of the examination. An alternative procedure called transnasal endoscopy may be available in some facilities. This involves passing a very thin scope (about the size of a drinking straw) through the nose. You are not sedated but a medication is applied to the nose to prevent discomfort. A full examination can be performed with this instrument. The endoscopist may take tissue samples called biopsies (not painful), or perform specific treatments (such as dilation, removal of polyps, treatment of bleeding), depending upon what is found during the examination. Air is introduced through the scope to open the esophagus, stomach, and intestine, allowing the scope to be passed through these structures and improving the endoscopist's ability to see all of the structures. You may experience a mild discomfort as air is pushed into the intestinal tract. This is not harmful and belching may relieve the sensation. The endoscope does not interfere with breathing. Taking slow, deep breaths during the procedure may help you to relax. ENDOSCOPY RECOVERY  After the endoscopy, you will be observed for one to two hours while the sedative medication wears off. The medicines cause most people to temporarily feel tired or have difficulty concentrating and you should not drive or return to work after the procedure. The most common discomfort after the examination is a feeling of bloating as a result of the air introduced during the examination. This usually resolves quickly. Some patients also have a mild sore throat. Most patients are able to eat shortly after the examination. ENDOSCOPY COMPLICATIONS  Upper endoscopy is a safe procedure and complications are uncommon.  The following is a list of possible complications:  Aspiration (inhaling) of food or fluids into the lungs, the risk of which can be minimized by not eating or drinking for the recommended period of time before the examination. The endoscope can cause a tear or hole in the tissue being examined. This is a serious complication but fortunately occurs only rarely. Bleeding can occur from biopsies or the removal of polyps, although it is usually minimal and stops quickly on its own or can be easily controlled. Reactions to the sedative medications are possible; the endoscopy team (doctors and nurses) will ask about previous medication allergies or reactions and about health problems such as heart, lung, kidney, or liver disease. Providing this information to the team ensures a safer examination. The medications may produce irritation in the vein at the site of the intravenous line. If redness, swelling, or discomfort occurs, your should call your endoscopist or primary care provider, or the number given by the nurse at discharge. The following signs and symptoms should be reported immediately:  Severe abdominal pain (more than gas cramps)   A firm, distended abdomen   Vomiting   Any temperature elevation   Difficulty swallowing or severe throat pain   A crunching feeling under the skin of the neck    AFTER UPPER ENDOSCOPY  Most patients tolerate endoscopy very well and feel fine afterwards. Some fatigue is common after the examination, and you should plan to take it easy and relax the rest of the day. The endoscopist can describe the result of their examination before you leave the endoscopy unit. If biopsies have been taken or polyps removed, you should call for results within one to two weeks. WHERE TO GET MORE INFORMATION  Your healthcare provider is the best source of information for questions and concerns related to your medical problem. The following organizations also provide reliable health information. [employer] (www.nlm.nih.gov/medlineplus/healthtopics. html)  The [address] of

## 2020-01-29 RX ORDER — BLOOD-GLUCOSE METER
KIT MISCELLANEOUS
Qty: 100 STRIP | Refills: 0 | Status: SHIPPED | OUTPATIENT
Start: 2020-01-29 | End: 2020-03-02

## 2020-02-20 ENCOUNTER — HOSPITAL ENCOUNTER (OUTPATIENT)
Dept: VASCULAR LAB | Age: 65
Discharge: HOME | End: 2020-02-20
Payer: COMMERCIAL

## 2020-02-20 ENCOUNTER — HOSPITAL ENCOUNTER (OUTPATIENT)
Dept: ULTRASOUND IMAGING | Age: 65
Discharge: HOME | End: 2020-02-20
Payer: COMMERCIAL

## 2020-02-20 PROCEDURE — 76705 ECHO EXAM OF ABDOMEN: CPT

## 2020-02-20 PROCEDURE — 93925 LOWER EXTREMITY STUDY: CPT

## 2020-02-21 NOTE — RESULT ENCOUNTER NOTE
Please call patient with stable labs or ultrasound in the setting of cirrhosis. No masses or change in liver status. Risk of liver cancer in cirrhosis is about 4% per year. Remind them of routine cirrhosis recommendations if needed: RUQ US and CBC, CMP, INR q6 months; EGD [id number], hepA&B vaccinations, flu shot yearly, pneumonia shot every 5 years, avoid smoking and alcohol, 2g Na 2L fluid limited diet and to follow up in the office with me every 6 month or sooner with any problems.   All labs and imaging generally ordered at time of office visit every 6 months should other tests need to be done based on any change in history, exam, etc.

## 2020-02-25 RX ORDER — UBIQUINOL 100 MG
CAPSULE ORAL
Qty: 100 EACH | Refills: 11 | Status: SHIPPED | OUTPATIENT
Start: 2020-02-25 | End: 2021-03-15

## 2020-03-02 RX ORDER — BLOOD-GLUCOSE METER
KIT MISCELLANEOUS
Qty: 100 STRIP | Refills: 0 | Status: SHIPPED | OUTPATIENT
Start: 2020-03-02 | End: 2020-03-17

## 2020-03-12 ENCOUNTER — TELEPHONE (OUTPATIENT)
Dept: VASCULAR SURGERY | Age: 65
End: 2020-03-12

## 2020-03-12 ENCOUNTER — OFFICE VISIT (OUTPATIENT)
Dept: VASCULAR SURGERY | Age: 65
End: 2020-03-12
Payer: COMMERCIAL

## 2020-03-12 VITALS
DIASTOLIC BLOOD PRESSURE: 60 MMHG | HEIGHT: 67 IN | WEIGHT: 220 LBS | SYSTOLIC BLOOD PRESSURE: 158 MMHG | BODY MASS INDEX: 34.53 KG/M2

## 2020-03-12 PROCEDURE — G8417 CALC BMI ABV UP PARAM F/U: HCPCS | Performed by: SURGERY

## 2020-03-12 PROCEDURE — G8484 FLU IMMUNIZE NO ADMIN: HCPCS | Performed by: SURGERY

## 2020-03-12 PROCEDURE — 99243 OFF/OP CNSLTJ NEW/EST LOW 30: CPT | Performed by: SURGERY

## 2020-03-12 PROCEDURE — G8427 DOCREV CUR MEDS BY ELIG CLIN: HCPCS | Performed by: SURGERY

## 2020-03-12 SDOH — HEALTH STABILITY: MENTAL HEALTH: HOW OFTEN DO YOU HAVE A DRINK CONTAINING ALCOHOL?: MONTHLY OR LESS

## 2020-03-12 NOTE — PROGRESS NOTES
Social Needs    Financial resource strain: Not on file    Food insecurity     Worry: Not on file     Inability: Not on file    Transportation needs     Medical: Not on file     Non-medical: Not on file   Tobacco Use    Smoking status: Current Every Day Smoker     Packs/day: 0.50     Years: 54.00     Pack years: 27.00     Types: Cigarettes, Cigars     Start date: 9/4/2018    Smokeless tobacco: Former User     Types: Snuff    Tobacco comment: pt started smoking again in January 2019   Substance and Sexual Activity    Alcohol use: Yes     Alcohol/week: 2.0 standard drinks     Types: 2 Cans of beer per week     Frequency: Monthly or less     Comment: occ    Drug use: No    Sexual activity: Yes     Partners: Female   Lifestyle    Physical activity     Days per week: Not on file     Minutes per session: Not on file    Stress: Not on file   Relationships    Social connections     Talks on phone: Not on file     Gets together: Not on file     Attends Sabianist service: Not on file     Active member of club or organization: Not on file     Attends meetings of clubs or organizations: Not on file     Relationship status: Not on file    Intimate partner violence     Fear of current or ex partner: Not on file     Emotionally abused: Not on file     Physically abused: Not on file     Forced sexual activity: Not on file   Other Topics Concern    Not on file   Social History Narrative    Not on file       Family History:        Problem Relation Age of Onset    Cancer Mother         Lung    Emphysema Father     Diabetes Sister     Hypertension Sister     Cancer Brother         throat    Asthma Neg Hx        Review of Systems:  A 14 point review of systems was completed. Pertinent positives identified in the HPI, all other review of systems negative.       Physical Examination:    BP (!) 158/60 (Site: Left Upper Arm)   Ht 5' 7\" (1.702 m)   Wt 220 lb (99.8 kg)   BMI 34.46 kg/m²     Weight: 220 lb (99.8 kg) General appearance: NAD  Eyes: PERRLA  Neck: no JVD, no lymphadenopathy. Respiratory: effort is unlabored, no crackles, wheezes or rubs. Cardiovascular: regular, no murmur. No carotid bruits. No edema or varicosities. Abdominal aorta:------------------  Pulses:    radial femoral DP PT   RIGHT 2 1 doppler doppler   LEFT 2 1 doppler doppler   GI: abdomen soft, nondistended, no organomegaly. Musculoskeletal: strength and tone normal.  Extremities: No ulcers. Rubor of both feet. Neuro/psychiatric: grossly intact. MEDICAL DECISION MAKING/TESTING      Arterial Duplex:    Impressions   Right Impression   The right ankle/brachial index is 0.58 (the DP is 73 and the PT is 81 mmHg). The absence of tri-phasic waveforms at the common femoral artery level may   indicate aorto-iliac inflow. There is abnormal monophasic flow throughout the   lower extremity. There is large atherosclerotic plaque seen within the common femoral, proximal   superficial femoral and deep femoral artery consistent with a < 50% stenosis. Minimal atherosclerotic plaque seen within the mid and distal superficial   femoral, popliteal artery and calf vessels is consistent with a < 50%   stenosis. The posterior tibial artery appears occluded. Left Impression   The right ankle/brachial index is 0.51 (the DP is 68 and the PT is 72 mmHg). The absence of tri-phasic waveforms at the common femoral artery level may   indicate aorto-iliac inflow. There is abnormal monophasic flow throughout the lower extremity with severely   dampened monophasic flow demonstrated in the anterior tibial artery. There is mild to moderate atherosclerotic plaque seen throughout the left   lower extremity consistent with a < 50% stenosis. Assessment:      Diagnosis Orders   1. Claudication in peripheral vascular disease [facility])       Patient is very symptomatic. Arterial studies indicative of AortoIliac inflow disease.

## 2020-03-17 ENCOUNTER — TELEPHONE (OUTPATIENT)
Dept: PULMONOLOGY | Age: 65
End: 2020-03-17

## 2020-03-17 RX ORDER — INSULIN GLARGINE 100 [IU]/ML
INJECTION, SOLUTION SUBCUTANEOUS
Qty: 6 ML | Refills: 0 | Status: SHIPPED | OUTPATIENT
Start: 2020-03-17 | End: 2020-05-15

## 2020-03-17 RX ORDER — BLOOD-GLUCOSE METER
KIT MISCELLANEOUS
Qty: 100 STRIP | Refills: 0 | Status: SHIPPED | OUTPATIENT
Start: 2020-03-17 | End: 2021-08-20

## 2020-03-17 NOTE — TELEPHONE ENCOUNTER
Patient did not show for 6 month fu COPD appointment  with Tyrel Fischer on 3/17/20    Same Day Cancellation: Yes    Patient rescheduled:  No    Patient was also no show on: N/A    LOV 9/17/19    Assessment:  · Moderate COPD on last PFT in 2011   · Severe dyspnea on exertion   · Hypercapnic respiratory failure at [facility]  · Obesity  · Lower lobe bronchiectasis on abdominal CT   · Cirrhosis  · Neck pain with radiation down right arm and to right great toe     Plan:   · Incruse daily, did not tolerate Anoro  · PRN albuterol  · MRI was recommended and patient is trying to get prior authorization currently  · 6 month f/u

## 2020-03-20 ENCOUNTER — TELEPHONE (OUTPATIENT)
Dept: VASCULAR SURGERY | Age: 65
End: 2020-03-20

## 2020-03-20 NOTE — TELEPHONE ENCOUNTER
Patient called and cancelled his procedure Tuesday due to coronavirus. Left message with patient to confirm. pamlr

## 2020-04-27 RX ORDER — METOPROLOL SUCCINATE 25 MG/1
25 TABLET, EXTENDED RELEASE ORAL DAILY
Qty: 30 TABLET | Refills: 0 | Status: ON HOLD | OUTPATIENT
Start: 2020-04-27 | End: 2020-05-05 | Stop reason: SDUPTHER

## 2020-04-27 RX ORDER — OMEPRAZOLE 40 MG/1
CAPSULE, DELAYED RELEASE ORAL
Qty: 30 CAPSULE | Refills: 0 | Status: SHIPPED | OUTPATIENT
Start: 2020-04-27 | End: 2020-05-04

## 2020-05-04 ENCOUNTER — VIRTUAL VISIT (OUTPATIENT)
Dept: FAMILY MEDICINE CLINIC | Age: 65
End: 2020-05-04
Payer: COMMERCIAL

## 2020-05-04 ENCOUNTER — TELEPHONE (OUTPATIENT)
Dept: VASCULAR SURGERY | Age: 65
End: 2020-05-04

## 2020-05-04 PROCEDURE — 99442 PR PHYS/QHP TELEPHONE EVALUATION 11-20 MIN: CPT | Performed by: FAMILY MEDICINE

## 2020-05-04 RX ORDER — PANTOPRAZOLE SODIUM 40 MG/1
40 TABLET, DELAYED RELEASE ORAL
Qty: 30 TABLET | Refills: 5 | Status: SHIPPED | OUTPATIENT
Start: 2020-05-04 | End: 2020-10-07 | Stop reason: CLARIF

## 2020-05-04 NOTE — TELEPHONE ENCOUNTER
Called patient as reminder of procedure tomorrow at [address] [place] [address]. Arrive at 7:00am for 9:00am procedure. Npo after midnight.  valentine

## 2020-05-05 ENCOUNTER — TELEPHONE (OUTPATIENT)
Dept: PULMONOLOGY | Age: 65
End: 2020-05-05

## 2020-05-05 ENCOUNTER — HOSPITAL ENCOUNTER (OUTPATIENT)
Dept: CARDIAC CATH/INVASIVE PROCEDURES | Age: 65
Discharge: HOME | End: 2020-05-05
Attending: SURGERY | Admitting: SURGERY
Payer: COMMERCIAL

## 2020-05-05 VITALS — HEIGHT: 67 IN | WEIGHT: 214 LBS | BODY MASS INDEX: 33.59 KG/M2

## 2020-05-05 LAB
ANION GAP SERPL CALCULATED.3IONS-SCNC: 10 MMOL/L (ref 3–16)
BASE EXCESS VENOUS: 7 (ref -3–3)
BUN BLDV-MCNC: 8 MG/DL (ref 7–20)
CALCIUM IONIZED: 1.2 MMOL/L (ref 1.12–1.32)
CALCIUM SERPL-MCNC: 10.2 MG/DL (ref 8.3–10.6)
CHLORIDE BLD-SCNC: 98 MMOL/L (ref 99–110)
CO2: 30 MMOL/L (ref 21–32)
CREAT SERPL-MCNC: 0.7 MG/DL (ref 0.8–1.3)
GFR AFRICAN AMERICAN: >60
GFR AFRICAN AMERICAN: >60
GFR NON-AFRICAN AMERICAN: >60
GFR NON-AFRICAN AMERICAN: >60
GLUCOSE BLD-MCNC: 120 MG/DL (ref 70–99)
GLUCOSE BLD-MCNC: 125 MG/DL (ref 70–99)
HCO3 VENOUS: 32.2 MMOL/L (ref 23–29)
HCT VFR BLD CALC: 37.6 % (ref 40.5–52.5)
HEMOGLOBIN: 12.2 G/DL (ref 13.5–17.5)
HEMOGLOBIN: 14.7 GM/DL (ref 13.5–17.5)
INR BLD: 1.03 (ref 0.86–1.14)
MCH RBC QN AUTO: 25.1 PG (ref 26–34)
MCHC RBC AUTO-ENTMCNC: 32.6 G/DL (ref 31–36)
MCV RBC AUTO: 77.2 FL (ref 80–100)
O2 SAT, VEN: 40 %
PCO2, VEN: 55.1 MM HG (ref 40–50)
PDW BLD-RTO: 18.9 % (ref 12.4–15.4)
PERFORMED ON: ABNORMAL
PH VENOUS: 7.38 (ref 7.35–7.45)
PLATELET # BLD: 361 K/UL (ref 135–450)
PMV BLD AUTO: 7.1 FL (ref 5–10.5)
PO2, VEN: 24 MM HG
POC CHLORIDE: 98 MMOL/L (ref 99–110)
POC CREATININE: 0.8 MG/DL (ref 0.8–1.3)
POC HEMATOCRIT: 43 % (ref 40.5–52.5)
POC POTASSIUM: 4.3 MMOL/L (ref 3.5–5.1)
POC SAMPLE TYPE: ABNORMAL
POC SODIUM: 140 MMOL/L (ref 136–145)
POTASSIUM SERPL-SCNC: 4.3 MMOL/L (ref 3.5–5.1)
PROTHROMBIN TIME: 12 SEC (ref 10–13.2)
RBC # BLD: 4.87 M/UL (ref 4.2–5.9)
SODIUM BLD-SCNC: 138 MMOL/L (ref 136–145)
TCO2 CALC VENOUS: 34 MMOL/L
WBC # BLD: 9.3 K/UL (ref 4–11)

## 2020-05-05 PROCEDURE — 36200 PLACE CATHETER IN AORTA: CPT | Performed by: SURGERY

## 2020-05-05 PROCEDURE — 2500000003 HC RX 250 WO HCPCS

## 2020-05-05 PROCEDURE — 82330 ASSAY OF CALCIUM: CPT

## 2020-05-05 PROCEDURE — 82803 BLOOD GASES ANY COMBINATION: CPT

## 2020-05-05 PROCEDURE — 80048 BASIC METABOLIC PNL TOTAL CA: CPT

## 2020-05-05 PROCEDURE — 82435 ASSAY OF BLOOD CHLORIDE: CPT

## 2020-05-05 PROCEDURE — C1894 INTRO/SHEATH, NON-LASER: HCPCS

## 2020-05-05 PROCEDURE — 99152 MOD SED SAME PHYS/QHP 5/>YRS: CPT | Performed by: SURGERY

## 2020-05-05 PROCEDURE — 75716 ARTERY X-RAYS ARMS/LEGS: CPT | Performed by: SURGERY

## 2020-05-05 PROCEDURE — 76937 US GUIDE VASCULAR ACCESS: CPT | Performed by: SURGERY

## 2020-05-05 PROCEDURE — 99153 MOD SED SAME PHYS/QHP EA: CPT

## 2020-05-05 PROCEDURE — 99152 MOD SED SAME PHYS/QHP 5/>YRS: CPT

## 2020-05-05 PROCEDURE — 75716 ARTERY X-RAYS ARMS/LEGS: CPT

## 2020-05-05 PROCEDURE — 82565 ASSAY OF CREATININE: CPT

## 2020-05-05 PROCEDURE — 2580000003 HC RX 258

## 2020-05-05 PROCEDURE — 84132 ASSAY OF SERUM POTASSIUM: CPT

## 2020-05-05 PROCEDURE — 6360000004 HC RX CONTRAST MEDICATION

## 2020-05-05 PROCEDURE — 84295 ASSAY OF SERUM SODIUM: CPT

## 2020-05-05 PROCEDURE — 82947 ASSAY GLUCOSE BLOOD QUANT: CPT

## 2020-05-05 PROCEDURE — 85027 COMPLETE CBC AUTOMATED: CPT

## 2020-05-05 PROCEDURE — C1769 GUIDE WIRE: HCPCS

## 2020-05-05 PROCEDURE — 6360000002 HC RX W HCPCS

## 2020-05-05 PROCEDURE — C1887 CATHETER, GUIDING: HCPCS

## 2020-05-05 PROCEDURE — 85014 HEMATOCRIT: CPT

## 2020-05-05 PROCEDURE — 75625 CONTRAST EXAM ABDOMINL AORTA: CPT

## 2020-05-05 PROCEDURE — 36200 PLACE CATHETER IN AORTA: CPT

## 2020-05-05 PROCEDURE — 85610 PROTHROMBIN TIME: CPT

## 2020-05-05 PROCEDURE — 2709999900 HC NON-CHARGEABLE SUPPLY

## 2020-05-05 RX ORDER — FENTANYL CITRATE 50 UG/ML
INJECTION, SOLUTION INTRAMUSCULAR; INTRAVENOUS
Status: COMPLETED | OUTPATIENT
Start: 2020-05-05 | End: 2020-05-05

## 2020-05-05 RX ORDER — MIDAZOLAM HYDROCHLORIDE 5 MG/ML
INJECTION INTRAMUSCULAR; INTRAVENOUS
Status: COMPLETED | OUTPATIENT
Start: 2020-05-05 | End: 2020-05-05

## 2020-05-05 RX ORDER — SODIUM CHLORIDE 9 MG/ML
INJECTION, SOLUTION INTRAVENOUS CONTINUOUS
Status: DISCONTINUED | OUTPATIENT
Start: 2020-05-05 | End: 2020-05-05 | Stop reason: HOSPADM

## 2020-05-05 RX ADMIN — FENTANYL CITRATE 25 MCG: 50 INJECTION, SOLUTION INTRAMUSCULAR; INTRAVENOUS at 11:09

## 2020-05-05 RX ADMIN — FENTANYL CITRATE 50 MCG: 50 INJECTION, SOLUTION INTRAMUSCULAR; INTRAVENOUS at 10:46

## 2020-05-05 RX ADMIN — MIDAZOLAM HYDROCHLORIDE 1 MG: 5 INJECTION INTRAMUSCULAR; INTRAVENOUS at 10:55

## 2020-05-05 RX ADMIN — MIDAZOLAM HYDROCHLORIDE 1 MG: 5 INJECTION INTRAMUSCULAR; INTRAVENOUS at 11:09

## 2020-05-05 RX ADMIN — SODIUM CHLORIDE: 9 INJECTION, SOLUTION INTRAVENOUS at 08:44

## 2020-05-05 RX ADMIN — MIDAZOLAM HYDROCHLORIDE 1 MG: 5 INJECTION INTRAMUSCULAR; INTRAVENOUS at 10:46

## 2020-05-05 NOTE — TELEPHONE ENCOUNTER
Tonia from Dr. Beck Blandon office called stating that pt needs aortobifemoral bypass and pt needs pulm clearance. Surgery is pending clearance and additional testing but is being planned within 1-2 weeks. Please advise. Tonia with Dr. Beck Blandon office can see clearance in Epic, but please call to let her know when pt is cleared at [phone].     OV 9/17/19:    Assessment:  · Moderate COPD on last PFT in 2011   · Severe dyspnea on exertion   · Hypercapnic respiratory failure at [facility]  · Obesity  · Lower lobe bronchiectasis on abdominal CT   · Cirrhosis  · Neck pain with radiation down right arm and to right great toe     Plan:   · Incruse daily, did not tolerate Anoro  · PRN albuterol  · MRI was recommended and patient is trying to get prior authorization currently  · 6 month f/u

## 2020-05-05 NOTE — H&P
History and Physical / Pre-Sedation Assessment    Patient:  Juliane Marrufo  :   1955    Intended Procedure: Aortogram with BLE angio, poss intervention    HPI: Severe claudication/rest pain  Nurses notes reviewed and agreed. Medications reviewed  Allergies:   Lisinopril; Ace inhibitors; Influenza vaccines; and Spiriva handihaler [tiotropium bromide monohydrate]        Physical Exam:   CURRENT VITALS:  Ht 5' 7\" (1.702 m)   Wt 214 lb (97.1 kg)   BMI 33.52 kg/m²   Airway:Normal  Cardiac:Normal  Pulmonary:Normal  Abdomen:Normal      Pre-Procedure Assessment/Plan:  ASA 2 - Patient with mild systemic disease with no functional limitations    Mallampati Airway Assessment:  Mallampati Class II - (soft palate, fauces & uvula are visible)    Level of Sedation Plan: Moderate sedation    Post Procedure plan: Return to same level of care    I assessed the patient and find that the patient is in satisfactory condition to proceed with the planned procedure and sedation plan. I have explained the risk, benefits, and alternatives to the procedure. The patient understands and agrees to proceed.   Yes    Christine Montemayor

## 2020-05-06 ENCOUNTER — OFFICE VISIT (OUTPATIENT)
Dept: PULMONOLOGY | Age: 65
End: 2020-05-06
Payer: COMMERCIAL

## 2020-05-06 VITALS
SYSTOLIC BLOOD PRESSURE: 138 MMHG | HEIGHT: 67 IN | DIASTOLIC BLOOD PRESSURE: 57 MMHG | BODY MASS INDEX: 34.69 KG/M2 | RESPIRATION RATE: 18 BRPM | WEIGHT: 221 LBS | TEMPERATURE: 98.2 F | OXYGEN SATURATION: 96 % | HEART RATE: 75 BPM

## 2020-05-06 PROCEDURE — G8427 DOCREV CUR MEDS BY ELIG CLIN: HCPCS | Performed by: INTERNAL MEDICINE

## 2020-05-06 PROCEDURE — 3017F COLORECTAL CA SCREEN DOC REV: CPT | Performed by: INTERNAL MEDICINE

## 2020-05-06 PROCEDURE — 3023F SPIROM DOC REV: CPT | Performed by: INTERNAL MEDICINE

## 2020-05-06 PROCEDURE — 99214 OFFICE O/P EST MOD 30 MIN: CPT | Performed by: INTERNAL MEDICINE

## 2020-05-06 PROCEDURE — 4004F PT TOBACCO SCREEN RCVD TLK: CPT | Performed by: INTERNAL MEDICINE

## 2020-05-06 PROCEDURE — G8417 CALC BMI ABV UP PARAM F/U: HCPCS | Performed by: INTERNAL MEDICINE

## 2020-05-06 PROCEDURE — G8926 SPIRO NO PERF OR DOC: HCPCS | Performed by: INTERNAL MEDICINE

## 2020-05-06 NOTE — PROGRESS NOTES
heart sounds. No lower extremity edema. Pulmonary/Chest: No wheezes. No rhonchi. No rales. + decreased breath sounds. No accessory muscle usage or stridor. Musculoskeletal: No cyanosis. No clubbing. Skin: Skin is warm and dry. Psychiatric: Normal mood and affect. Neurologic: speech fluent, alert and oriented, strength symmetric     Data  CT SCAN ABD /PELVIS 2/18/18  FINDINGS:   Lower Chest: There is mild bibasilar cylindrical bronchiectasis. Hudson Elyssa is no   lobar consolidation or pleural effusion.       Organs: The liver exhibits a nodular surface contour consistent with   cirrhosis.  The remainder of the solid abdominal organs are unremarkable.       GI/Bowel: There is no bowel dilatation, wall thickening or obstruction.  The   appendix is normal.       Pelvis: The bladder and prostate are within normal limits.       Peritoneum/Retroperitoneum: There is no free air, free fluid or   intraperitoneal inflammatory change.  There is no adenopathy.  Mild   atherosclerotic changes are present within the aortoiliac system.       Bones/Soft Tissues: There is no fracture or osseous destruction.           Impression   Cirrhosis. L Hilar TBNA 2-8-11 - no malignant cells; FLOW normal polyclonal B/T cells    PFT 2-10-11 FEV1 2L 62% % DLCO 100%    Assessment:  · Moderate COPD on last PFT in 2011   · Peripheral vascular disease with plan for bypass surgery  · Obesity  · Lower lobe bronchiectasis on abdominal CT   · Cirrhosis  · Neck pain with radiation down right arm and to right great toe    Plan:   · Incruse daily, did not tolerate Anoro  · PRN albuterol  · Patient may proceed to the operating room from pulmonary perspective without further testing. Because of underlying disease, he is at increased risk of radha-operative complications, including atelectasis, pneumonia and, when general anesthesia is required, failure to liberate from mechanical ventilation.   However, this should not preclude him from having

## 2020-05-07 ENCOUNTER — TELEPHONE (OUTPATIENT)
Dept: FAMILY MEDICINE CLINIC | Age: 65
End: 2020-05-07

## 2020-05-07 ENCOUNTER — TELEPHONE (OUTPATIENT)
Dept: PULMONOLOGY | Age: 65
End: 2020-05-07

## 2020-05-07 ENCOUNTER — TELEPHONE (OUTPATIENT)
Dept: VASCULAR SURGERY | Age: 65
End: 2020-05-07

## 2020-05-07 NOTE — TELEPHONE ENCOUNTER
Pt called stating that PCP was going to switch him to omeprazole. Not seeing that Rx was sent.  Please send Rx to [address] and call pt on [phone]

## 2020-05-08 ENCOUNTER — TELEPHONE (OUTPATIENT)
Dept: VASCULAR SURGERY | Age: 65
End: 2020-05-08

## 2020-05-12 RX ORDER — METOPROLOL SUCCINATE 25 MG/1
25 TABLET, EXTENDED RELEASE ORAL DAILY
Qty: 30 TABLET | Refills: 0 | Status: SHIPPED | OUTPATIENT
Start: 2020-05-12 | End: 2020-05-29 | Stop reason: DRUGHIGH

## 2020-05-14 NOTE — TELEPHONE ENCOUNTER
Future appt scheduled 09/08/2020       Last appt 05/04/2020      Last written 03/17/2020    insulin glargine (BASAGLAR KWIKPEN) 100 UNIT/ML injection pen  6 mL  0 RF

## 2020-05-15 RX ORDER — INSULIN GLARGINE 100 [IU]/ML
INJECTION, SOLUTION SUBCUTANEOUS
Qty: 6 ML | Refills: 0 | Status: SHIPPED | OUTPATIENT
Start: 2020-05-15 | End: 2020-05-29 | Stop reason: CLARIF

## 2020-05-15 RX ORDER — ONDANSETRON 4 MG/1
TABLET, FILM COATED ORAL
Qty: 20 TABLET | Refills: 0 | Status: SHIPPED | OUTPATIENT
Start: 2020-05-15 | End: 2020-05-20

## 2020-05-20 ENCOUNTER — TELEPHONE (OUTPATIENT)
Dept: ADMINISTRATIVE | Age: 65
End: 2020-05-20

## 2020-05-20 RX ORDER — ONDANSETRON 4 MG/1
TABLET, FILM COATED ORAL
Qty: 20 TABLET | Refills: 0 | Status: SHIPPED | OUTPATIENT
Start: 2020-05-20 | End: 2020-05-27

## 2020-05-27 ENCOUNTER — TELEPHONE (OUTPATIENT)
Dept: ADMINISTRATIVE | Age: 65
End: 2020-05-27

## 2020-05-27 RX ORDER — ONDANSETRON 4 MG/1
TABLET, FILM COATED ORAL
Qty: 20 TABLET | Refills: 0 | Status: SHIPPED | OUTPATIENT
Start: 2020-05-27 | End: 2020-06-24

## 2020-05-29 RX ORDER — INSULIN GLARGINE 100 [IU]/ML
INJECTION, SOLUTION SUBCUTANEOUS
Qty: 5 PEN | Refills: 3 | OUTPATIENT
Start: 2020-05-29 | End: 2020-06-19

## 2020-06-08 RX ORDER — SPIRONOLACTONE 50 MG/1
TABLET, FILM COATED ORAL
Qty: 60 TABLET | Refills: 0 | Status: SHIPPED | OUTPATIENT
Start: 2020-06-08 | End: 2020-06-10

## 2020-06-10 RX ORDER — SPIRONOLACTONE 50 MG/1
TABLET, FILM COATED ORAL
Qty: 60 TABLET | Refills: 3 | Status: ON HOLD | OUTPATIENT
Start: 2020-06-10 | End: 2020-08-06 | Stop reason: HOSPADM

## 2020-06-19 RX ORDER — INSULIN GLARGINE 100 [IU]/ML
INJECTION, SOLUTION SUBCUTANEOUS
Qty: 6 ML | Refills: 0 | Status: SHIPPED | OUTPATIENT
Start: 2020-06-19 | End: 2020-07-14

## 2020-06-24 RX ORDER — ONDANSETRON HYDROCHLORIDE 8 MG/1
TABLET, FILM COATED ORAL
Qty: 20 TABLET | Refills: 0 | Status: SHIPPED | OUTPATIENT
Start: 2020-06-24 | End: 2020-08-19

## 2020-07-06 RX ORDER — UMECLIDINIUM 62.5 UG/1
AEROSOL, POWDER ORAL
Qty: 1 EACH | Refills: 0 | Status: SHIPPED | OUTPATIENT
Start: 2020-07-06 | End: 2020-08-03

## 2020-07-14 RX ORDER — INSULIN GLARGINE 100 [IU]/ML
INJECTION, SOLUTION SUBCUTANEOUS
Qty: 6 ML | Refills: 11 | Status: ON HOLD | OUTPATIENT
Start: 2020-07-14 | End: 2020-08-04

## 2020-07-21 ENCOUNTER — TELEPHONE (OUTPATIENT)
Dept: VASCULAR SURGERY | Age: 65
End: 2020-07-21

## 2020-07-21 NOTE — TELEPHONE ENCOUNTER
Called patient regarding all his testing dates and instructions. valentine   Patient is scheduled for 7/23/20 at [facility] for non contrast CT at 9:00am/arrival of 8:00am, NPO 4 hours prior. Hold Metformin in am.   Cardiac stress test is at [address] at 8:30am arrival. NPO after midnight and no caffiene 24 hrs and no smoking 12 hours prior. Getting COVID test done July 29 and PAT done July 29, 20. Surgery is 8/6/20 at [address] [place] [address] at 7:30am. Arrive at 5:30am.  NPO after midnight. Patient understands and has written down all dates and times of procedures.  valentine

## 2020-07-23 ENCOUNTER — HOSPITAL ENCOUNTER (OUTPATIENT)
Dept: CT IMAGING | Age: 65
Discharge: HOME | End: 2020-07-23
Payer: COMMERCIAL

## 2020-07-23 PROCEDURE — 74176 CT ABD & PELVIS W/O CONTRAST: CPT

## 2020-07-28 ENCOUNTER — PREP FOR PROCEDURE (OUTPATIENT)
Dept: VASCULAR SURGERY | Age: 65
End: 2020-07-28

## 2020-07-29 ENCOUNTER — HOSPITAL ENCOUNTER (OUTPATIENT)
Dept: CARDIOLOGY | Age: 65
Discharge: HOME | End: 2020-07-29
Payer: COMMERCIAL

## 2020-07-29 ENCOUNTER — HOSPITAL ENCOUNTER (OUTPATIENT)
Dept: PREADMISSION TESTING | Age: 65
Discharge: HOME | End: 2020-08-02
Payer: COMMERCIAL

## 2020-07-29 ENCOUNTER — TELEPHONE (OUTPATIENT)
Dept: VASCULAR SURGERY | Age: 65
End: 2020-07-29

## 2020-07-29 LAB
ABO/RH: NORMAL
ANION GAP SERPL CALCULATED.3IONS-SCNC: 14 MMOL/L (ref 3–16)
ANTIBODY SCREEN: NORMAL
BASOPHILS ABSOLUTE: 0.1 K/UL (ref 0–0.2)
BASOPHILS RELATIVE PERCENT: 0.7 %
BUN BLDV-MCNC: 18 MG/DL (ref 7–20)
CALCIUM SERPL-MCNC: 18.6 MG/DL (ref 8.3–10.6)
CHLORIDE BLD-SCNC: 92 MMOL/L (ref 99–110)
CO2: 31 MMOL/L (ref 21–32)
CREAT SERPL-MCNC: 1.5 MG/DL (ref 0.8–1.3)
EKG ATRIAL RATE: 85 BPM
EKG DIAGNOSIS: NORMAL
EKG P AXIS: 72 DEGREES
EKG P-R INTERVAL: 240 MS
EKG Q-T INTERVAL: 338 MS
EKG QRS DURATION: 86 MS
EKG QTC CALCULATION (BAZETT): 402 MS
EKG R AXIS: -3 DEGREES
EKG T AXIS: 64 DEGREES
EKG VENTRICULAR RATE: 85 BPM
EOSINOPHILS ABSOLUTE: 0.2 K/UL (ref 0–0.6)
EOSINOPHILS RELATIVE PERCENT: 1.1 %
GFR AFRICAN AMERICAN: 57
GFR NON-AFRICAN AMERICAN: 47
GLUCOSE BLD-MCNC: 107 MG/DL (ref 70–99)
HCT VFR BLD CALC: 40.8 % (ref 40.5–52.5)
HEMOGLOBIN: 12.5 G/DL (ref 13.5–17.5)
LV EF: 60 %
LVEF MODALITY: NORMAL
LYMPHOCYTES ABSOLUTE: 2.2 K/UL (ref 1–5.1)
LYMPHOCYTES RELATIVE PERCENT: 13.8 %
MCH RBC QN AUTO: 22.9 PG (ref 26–34)
MCHC RBC AUTO-ENTMCNC: 30.6 G/DL (ref 31–36)
MCV RBC AUTO: 74.9 FL (ref 80–100)
MONOCYTES ABSOLUTE: 1.4 K/UL (ref 0–1.3)
MONOCYTES RELATIVE PERCENT: 9 %
NEUTROPHILS ABSOLUTE: 12 K/UL (ref 1.7–7.7)
NEUTROPHILS RELATIVE PERCENT: 75.4 %
PDW BLD-RTO: 19.2 % (ref 12.4–15.4)
PLATELET # BLD: 408 K/UL (ref 135–450)
PMV BLD AUTO: 7.8 FL (ref 5–10.5)
POTASSIUM SERPL-SCNC: 4.2 MMOL/L (ref 3.5–5.1)
RBC # BLD: 5.45 M/UL (ref 4.2–5.9)
SODIUM BLD-SCNC: 137 MMOL/L (ref 136–145)
WBC # BLD: 15.9 K/UL (ref 4–11)

## 2020-07-29 PROCEDURE — 3430000000 HC RX DIAGNOSTIC RADIOPHARMACEUTICAL: Performed by: SURGERY

## 2020-07-29 PROCEDURE — 80048 BASIC METABOLIC PNL TOTAL CA: CPT

## 2020-07-29 PROCEDURE — 93005 ELECTROCARDIOGRAM TRACING: CPT | Performed by: SURGERY

## 2020-07-29 PROCEDURE — 86900 BLOOD TYPING SEROLOGIC ABO: CPT

## 2020-07-29 PROCEDURE — 78452 HT MUSCLE IMAGE SPECT MULT: CPT

## 2020-07-29 PROCEDURE — A9502 TC99M TETROFOSMIN: HCPCS | Performed by: SURGERY

## 2020-07-29 PROCEDURE — 86901 BLOOD TYPING SEROLOGIC RH(D): CPT

## 2020-07-29 PROCEDURE — 36415 COLL VENOUS BLD VENIPUNCTURE: CPT

## 2020-07-29 PROCEDURE — 85025 COMPLETE CBC W/AUTO DIFF WBC: CPT

## 2020-07-29 PROCEDURE — 6360000002 HC RX W HCPCS: Performed by: SURGERY

## 2020-07-29 PROCEDURE — 93017 CV STRESS TEST TRACING ONLY: CPT

## 2020-07-29 PROCEDURE — 86850 RBC ANTIBODY SCREEN: CPT

## 2020-07-29 PROCEDURE — 93010 ELECTROCARDIOGRAM REPORT: CPT | Performed by: INTERNAL MEDICINE

## 2020-07-29 RX ADMIN — TETROFOSMIN 32.1 MILLICURIE: 1.38 INJECTION, POWDER, LYOPHILIZED, FOR SOLUTION INTRAVENOUS at 10:40

## 2020-07-29 RX ADMIN — TETROFOSMIN 11.5 MILLICURIE: 1.38 INJECTION, POWDER, LYOPHILIZED, FOR SOLUTION INTRAVENOUS at 08:50

## 2020-07-29 RX ADMIN — REGADENOSON 0.4 MG: 0.08 INJECTION, SOLUTION INTRAVENOUS at 10:40

## 2020-08-03 ENCOUNTER — HOSPITAL ENCOUNTER (OUTPATIENT)
Age: 65
Discharge: HOME | DRG: 812 | End: 2020-08-03
Payer: COMMERCIAL

## 2020-08-03 ENCOUNTER — APPOINTMENT (OUTPATIENT)
Dept: CT IMAGING | Age: 65
DRG: 812 | End: 2020-08-03
Payer: COMMERCIAL

## 2020-08-03 ENCOUNTER — HOSPITAL ENCOUNTER (INPATIENT)
Age: 65
LOS: 3 days | Discharge: HOME | DRG: 812 | End: 2020-08-06
Attending: EMERGENCY MEDICINE | Admitting: INTERNAL MEDICINE
Payer: COMMERCIAL

## 2020-08-03 ENCOUNTER — TELEPHONE (OUTPATIENT)
Dept: VASCULAR SURGERY | Age: 65
End: 2020-08-03

## 2020-08-03 ENCOUNTER — TELEPHONE (OUTPATIENT)
Dept: FAMILY MEDICINE CLINIC | Age: 65
End: 2020-08-03

## 2020-08-03 PROBLEM — E83.52 HYPERCALCEMIA: Status: ACTIVE | Noted: 2020-08-03

## 2020-08-03 LAB
A/G RATIO: 1 (ref 1.1–2.2)
ALBUMIN SERPL-MCNC: 4.4 G/DL (ref 3.4–5)
ALP BLD-CCNC: 132 U/L (ref 40–129)
ALT SERPL-CCNC: 19 U/L (ref 10–40)
ANION GAP SERPL CALCULATED.3IONS-SCNC: 11 MMOL/L (ref 3–16)
ANION GAP SERPL CALCULATED.3IONS-SCNC: 8 MMOL/L (ref 3–16)
AST SERPL-CCNC: 20 U/L (ref 15–37)
BASOPHILS ABSOLUTE: 0.1 K/UL (ref 0–0.2)
BASOPHILS RELATIVE PERCENT: 1 %
BILIRUB SERPL-MCNC: <0.2 MG/DL (ref 0–1)
BILIRUBIN URINE: NEGATIVE
BLOOD, URINE: NEGATIVE
BUN BLDV-MCNC: 37 MG/DL (ref 7–20)
BUN BLDV-MCNC: 37 MG/DL (ref 7–20)
CALCIUM IONIZED: 1.66 MMOL/L (ref 1.12–1.32)
CALCIUM IONIZED: 1.83 MMOL/L (ref 1.12–1.32)
CALCIUM SERPL-MCNC: 13.5 MG/DL (ref 8.3–10.6)
CALCIUM SERPL-MCNC: 16.2 MG/DL (ref 8.3–10.6)
CALCIUM SERPL-MCNC: 16.3 MG/DL (ref 8.3–10.6)
CHLORIDE BLD-SCNC: 93 MMOL/L (ref 99–110)
CHLORIDE BLD-SCNC: 97 MMOL/L (ref 99–110)
CLARITY: CLEAR
CO2: 32 MMOL/L (ref 21–32)
CO2: 33 MMOL/L (ref 21–32)
COLOR: YELLOW
CREAT SERPL-MCNC: 2.5 MG/DL (ref 0.8–1.3)
CREAT SERPL-MCNC: 2.6 MG/DL (ref 0.8–1.3)
EKG ATRIAL RATE: 46 BPM
EKG DIAGNOSIS: NORMAL
EKG P AXIS: 68 DEGREES
EKG P-R INTERVAL: 268 MS
EKG Q-T INTERVAL: 446 MS
EKG QRS DURATION: 104 MS
EKG QTC CALCULATION (BAZETT): 390 MS
EKG R AXIS: -36 DEGREES
EKG T AXIS: 55 DEGREES
EKG VENTRICULAR RATE: 46 BPM
EOSINOPHILS ABSOLUTE: 0.3 K/UL (ref 0–0.6)
EOSINOPHILS RELATIVE PERCENT: 1.9 %
GFR AFRICAN AMERICAN: 30
GFR AFRICAN AMERICAN: 32
GFR NON-AFRICAN AMERICAN: 25
GFR NON-AFRICAN AMERICAN: 26
GLOBULIN: 4.3 G/DL
GLUCOSE BLD-MCNC: 100 MG/DL (ref 70–99)
GLUCOSE BLD-MCNC: 101 MG/DL (ref 70–99)
GLUCOSE BLD-MCNC: 114 MG/DL (ref 70–99)
GLUCOSE URINE: NEGATIVE MG/DL
HCT VFR BLD CALC: 39.2 % (ref 40.5–52.5)
HEMOGLOBIN: 12.1 G/DL (ref 13.5–17.5)
KETONES, URINE: NEGATIVE MG/DL
LEUKOCYTE ESTERASE, URINE: NEGATIVE
LIPASE: 66 U/L (ref 13–60)
LYMPHOCYTES ABSOLUTE: 3 K/UL (ref 1–5.1)
LYMPHOCYTES RELATIVE PERCENT: 19.5 %
MAGNESIUM: 1.5 MG/DL (ref 1.8–2.4)
MAGNESIUM: 1.6 MG/DL (ref 1.8–2.4)
MCH RBC QN AUTO: 23.3 PG (ref 26–34)
MCHC RBC AUTO-ENTMCNC: 30.9 G/DL (ref 31–36)
MCV RBC AUTO: 75.5 FL (ref 80–100)
MICROSCOPIC EXAMINATION: NORMAL
MONOCYTES ABSOLUTE: 1.3 K/UL (ref 0–1.3)
MONOCYTES RELATIVE PERCENT: 8.3 %
NEUTROPHILS ABSOLUTE: 10.6 K/UL (ref 1.7–7.7)
NEUTROPHILS RELATIVE PERCENT: 69.3 %
NITRITE, URINE: NEGATIVE
PDW BLD-RTO: 19.7 % (ref 12.4–15.4)
PERFORMED ON: ABNORMAL
PH UA: 6 (ref 5–8)
PH VENOUS: 7.38 (ref 7.35–7.45)
PH VENOUS: 7.4 (ref 7.35–7.45)
PLATELET # BLD: 363 K/UL (ref 135–450)
PMV BLD AUTO: 7.7 FL (ref 5–10.5)
POTASSIUM REFLEX MAGNESIUM: 3.2 MMOL/L (ref 3.5–5.1)
POTASSIUM REFLEX MAGNESIUM: 3.5 MMOL/L (ref 3.5–5.1)
PROTEIN UA: NEGATIVE MG/DL
RBC # BLD: 5.19 M/UL (ref 4.2–5.9)
SODIUM BLD-SCNC: 136 MMOL/L (ref 136–145)
SODIUM BLD-SCNC: 138 MMOL/L (ref 136–145)
SPECIFIC GRAVITY UA: 1.02 (ref 1–1.03)
TOTAL PROTEIN: 8.7 G/DL (ref 6.4–8.2)
TROPONIN: <0.01 NG/ML
URINE REFLEX TO CULTURE: NORMAL
URINE TYPE: NORMAL
UROBILINOGEN, URINE: 0.2 E.U./DL
WBC # BLD: 15.2 K/UL (ref 4–11)

## 2020-08-03 PROCEDURE — 80053 COMPREHEN METABOLIC PANEL: CPT

## 2020-08-03 PROCEDURE — 83735 ASSAY OF MAGNESIUM: CPT

## 2020-08-03 PROCEDURE — 2580000003 HC RX 258: Performed by: PHYSICIAN ASSISTANT

## 2020-08-03 PROCEDURE — 82310 ASSAY OF CALCIUM: CPT

## 2020-08-03 PROCEDURE — 6360000002 HC RX W HCPCS: Performed by: PHYSICIAN ASSISTANT

## 2020-08-03 PROCEDURE — 82330 ASSAY OF CALCIUM: CPT

## 2020-08-03 PROCEDURE — 36415 COLL VENOUS BLD VENIPUNCTURE: CPT

## 2020-08-03 PROCEDURE — 83690 ASSAY OF LIPASE: CPT

## 2020-08-03 PROCEDURE — 99285 EMERGENCY DEPT VISIT HI MDM: CPT

## 2020-08-03 PROCEDURE — 96365 THER/PROPH/DIAG IV INF INIT: CPT

## 2020-08-03 PROCEDURE — 2700000000 HC OXYGEN THERAPY PER DAY

## 2020-08-03 PROCEDURE — 94761 N-INVAS EAR/PLS OXIMETRY MLT: CPT

## 2020-08-03 PROCEDURE — 96361 HYDRATE IV INFUSION ADD-ON: CPT

## 2020-08-03 PROCEDURE — 93010 ELECTROCARDIOGRAM REPORT: CPT | Performed by: INTERNAL MEDICINE

## 2020-08-03 PROCEDURE — 85025 COMPLETE CBC W/AUTO DIFF WBC: CPT

## 2020-08-03 PROCEDURE — 93005 ELECTROCARDIOGRAM TRACING: CPT | Performed by: STUDENT IN AN ORGANIZED HEALTH CARE EDUCATION/TRAINING PROGRAM

## 2020-08-03 PROCEDURE — 6370000000 HC RX 637 (ALT 250 FOR IP): Performed by: INTERNAL MEDICINE

## 2020-08-03 PROCEDURE — 2580000003 HC RX 258: Performed by: EMERGENCY MEDICINE

## 2020-08-03 PROCEDURE — 83036 HEMOGLOBIN GLYCOSYLATED A1C: CPT

## 2020-08-03 PROCEDURE — 81003 URINALYSIS AUTO W/O SCOPE: CPT

## 2020-08-03 PROCEDURE — 6370000000 HC RX 637 (ALT 250 FOR IP): Performed by: PHYSICIAN ASSISTANT

## 2020-08-03 PROCEDURE — 94640 AIRWAY INHALATION TREATMENT: CPT

## 2020-08-03 PROCEDURE — 74176 CT ABD & PELVIS W/O CONTRAST: CPT

## 2020-08-03 PROCEDURE — 96375 TX/PRO/DX INJ NEW DRUG ADDON: CPT

## 2020-08-03 PROCEDURE — 84484 ASSAY OF TROPONIN QUANT: CPT

## 2020-08-03 PROCEDURE — 2060000000 HC ICU INTERMEDIATE R&B

## 2020-08-03 RX ORDER — FOLIC ACID 1 MG/1
1 TABLET ORAL DAILY
Status: DISCONTINUED | OUTPATIENT
Start: 2020-08-03 | End: 2020-08-06 | Stop reason: HOSPADM

## 2020-08-03 RX ORDER — METOPROLOL SUCCINATE 50 MG/1
50 TABLET, EXTENDED RELEASE ORAL DAILY
Status: DISCONTINUED | OUTPATIENT
Start: 2020-08-03 | End: 2020-08-06 | Stop reason: HOSPADM

## 2020-08-03 RX ORDER — ACETAMINOPHEN 325 MG/1
650 TABLET ORAL EVERY 6 HOURS PRN
Status: DISCONTINUED | OUTPATIENT
Start: 2020-08-03 | End: 2020-08-06 | Stop reason: HOSPADM

## 2020-08-03 RX ORDER — SODIUM CHLORIDE 0.9 % (FLUSH) 0.9 %
10 SYRINGE (ML) INJECTION PRN
Status: DISCONTINUED | OUTPATIENT
Start: 2020-08-03 | End: 2020-08-06 | Stop reason: HOSPADM

## 2020-08-03 RX ORDER — ALBUTEROL SULFATE 90 UG/1
2 AEROSOL, METERED RESPIRATORY (INHALATION) EVERY 6 HOURS PRN
Status: DISCONTINUED | OUTPATIENT
Start: 2020-08-03 | End: 2020-08-03

## 2020-08-03 RX ORDER — INSULIN GLARGINE 100 [IU]/ML
25 INJECTION, SOLUTION SUBCUTANEOUS NIGHTLY
Status: DISCONTINUED | OUTPATIENT
Start: 2020-08-03 | End: 2020-08-04

## 2020-08-03 RX ORDER — MAGNESIUM SULFATE 1 G/100ML
1 INJECTION INTRAVENOUS ONCE
Status: COMPLETED | OUTPATIENT
Start: 2020-08-03 | End: 2020-08-03

## 2020-08-03 RX ORDER — ALBUTEROL SULFATE 90 UG/1
2 AEROSOL, METERED RESPIRATORY (INHALATION) EVERY 4 HOURS PRN
Status: DISCONTINUED | OUTPATIENT
Start: 2020-08-03 | End: 2020-08-06 | Stop reason: HOSPADM

## 2020-08-03 RX ORDER — PROMETHAZINE HYDROCHLORIDE 25 MG/1
12.5 TABLET ORAL EVERY 6 HOURS PRN
Status: DISCONTINUED | OUTPATIENT
Start: 2020-08-03 | End: 2020-08-06 | Stop reason: HOSPADM

## 2020-08-03 RX ORDER — UMECLIDINIUM 62.5 UG/1
AEROSOL, POWDER ORAL
Qty: 1 EACH | Refills: 11 | Status: SHIPPED | OUTPATIENT
Start: 2020-08-03 | End: 2021-04-13

## 2020-08-03 RX ORDER — SODIUM CHLORIDE 0.9 % (FLUSH) 0.9 %
10 SYRINGE (ML) INJECTION EVERY 12 HOURS SCHEDULED
Status: DISCONTINUED | OUTPATIENT
Start: 2020-08-03 | End: 2020-08-06 | Stop reason: HOSPADM

## 2020-08-03 RX ORDER — BUDESONIDE 0.5 MG/2ML
500 INHALANT ORAL 2 TIMES DAILY
Status: DISCONTINUED | OUTPATIENT
Start: 2020-08-03 | End: 2020-08-06 | Stop reason: HOSPADM

## 2020-08-03 RX ORDER — SODIUM CHLORIDE 9 MG/ML
INJECTION, SOLUTION INTRAVENOUS CONTINUOUS
Status: DISCONTINUED | OUTPATIENT
Start: 2020-08-03 | End: 2020-08-06 | Stop reason: HOSPADM

## 2020-08-03 RX ORDER — NICOTINE POLACRILEX 4 MG
15 LOZENGE BUCCAL PRN
Status: DISCONTINUED | OUTPATIENT
Start: 2020-08-03 | End: 2020-08-06 | Stop reason: HOSPADM

## 2020-08-03 RX ORDER — ACETAMINOPHEN 650 MG/1
650 SUPPOSITORY RECTAL EVERY 6 HOURS PRN
Status: DISCONTINUED | OUTPATIENT
Start: 2020-08-03 | End: 2020-08-06 | Stop reason: HOSPADM

## 2020-08-03 RX ORDER — PANTOPRAZOLE SODIUM 40 MG/1
40 TABLET, DELAYED RELEASE ORAL
Status: DISCONTINUED | OUTPATIENT
Start: 2020-08-04 | End: 2020-08-06 | Stop reason: HOSPADM

## 2020-08-03 RX ORDER — DEXTROSE MONOHYDRATE 50 MG/ML
100 INJECTION, SOLUTION INTRAVENOUS PRN
Status: DISCONTINUED | OUTPATIENT
Start: 2020-08-03 | End: 2020-08-06 | Stop reason: HOSPADM

## 2020-08-03 RX ORDER — 0.9 % SODIUM CHLORIDE 0.9 %
1000 INTRAVENOUS SOLUTION INTRAVENOUS ONCE
Status: COMPLETED | OUTPATIENT
Start: 2020-08-03 | End: 2020-08-03

## 2020-08-03 RX ORDER — DEXTROSE MONOHYDRATE 25 G/50ML
12.5 INJECTION, SOLUTION INTRAVENOUS PRN
Status: DISCONTINUED | OUTPATIENT
Start: 2020-08-03 | End: 2020-08-06 | Stop reason: HOSPADM

## 2020-08-03 RX ORDER — ATORVASTATIN CALCIUM 40 MG/1
40 TABLET, FILM COATED ORAL DAILY
Status: DISCONTINUED | OUTPATIENT
Start: 2020-08-03 | End: 2020-08-06 | Stop reason: HOSPADM

## 2020-08-03 RX ORDER — ONDANSETRON 2 MG/ML
4 INJECTION INTRAMUSCULAR; INTRAVENOUS EVERY 6 HOURS PRN
Status: DISCONTINUED | OUTPATIENT
Start: 2020-08-03 | End: 2020-08-06 | Stop reason: HOSPADM

## 2020-08-03 RX ORDER — POLYETHYLENE GLYCOL 3350 17 G/17G
17 POWDER, FOR SOLUTION ORAL DAILY PRN
Status: DISCONTINUED | OUTPATIENT
Start: 2020-08-03 | End: 2020-08-06 | Stop reason: HOSPADM

## 2020-08-03 RX ORDER — ONDANSETRON 2 MG/ML
4 INJECTION INTRAMUSCULAR; INTRAVENOUS ONCE
Status: COMPLETED | OUTPATIENT
Start: 2020-08-03 | End: 2020-08-03

## 2020-08-03 RX ADMIN — ONDANSETRON HYDROCHLORIDE 4 MG: 2 INJECTION, SOLUTION INTRAMUSCULAR; INTRAVENOUS at 15:19

## 2020-08-03 RX ADMIN — MAGNESIUM SULFATE HEPTAHYDRATE 1 G: 1 INJECTION, SOLUTION INTRAVENOUS at 16:09

## 2020-08-03 RX ADMIN — BUDESONIDE 500 MCG: 0.5 SUSPENSION RESPIRATORY (INHALATION) at 20:55

## 2020-08-03 RX ADMIN — SODIUM CHLORIDE 1000 ML: 9 INJECTION, SOLUTION INTRAVENOUS at 16:14

## 2020-08-03 RX ADMIN — ENOXAPARIN SODIUM 30 MG: 30 INJECTION, SOLUTION INTRAVENOUS; SUBCUTANEOUS at 21:37

## 2020-08-03 RX ADMIN — Medication 10 ML: at 21:38

## 2020-08-03 RX ADMIN — SODIUM CHLORIDE: 9 INJECTION, SOLUTION INTRAVENOUS at 21:37

## 2020-08-03 RX ADMIN — Medication 2 PUFF: at 20:55

## 2020-08-03 RX ADMIN — INSULIN GLARGINE 25 UNITS: 100 INJECTION, SOLUTION SUBCUTANEOUS at 21:38

## 2020-08-03 RX ADMIN — SODIUM CHLORIDE 1000 ML: 9 INJECTION, SOLUTION INTRAVENOUS at 15:19

## 2020-08-03 ASSESSMENT — ENCOUNTER SYMPTOMS
SHORTNESS OF BREATH: 0
COUGH: 1
DIARRHEA: 0
VOMITING: 1
NAUSEA: 1
ABDOMINAL PAIN: 1

## 2020-08-03 NOTE — ED NOTES
Report given to [employer]. Roz transporting pt to floor via wheelchair.      Shad Avendaño RN  08/03/20 [id number]

## 2020-08-03 NOTE — PROGRESS NOTES
Patient admitted to room 317 bed 1 from ER . Patient oriented to room, call light, bed rails, phone, lights and bathroom. Patient instructed about the schedule of the day including: vital sign frequency, lab draws, possible tests, frequency of MD and staff rounds, daily weights, I &O's and prescribed diet. Bed alarm not in place, patient agrees to use call light. Telemetry box in place, patient aware of placement and reason. Bed locked, in lowest position, side rails up 2/4, call light within reach. Recliner Assessment  Patient is able to demonstrated the ability to move from a reclining position to an upright position within the recliner. 4 Eyes Skin Assessment     The patient is being assess for   Admission    I agree that 2 RN's have performed a thorough Head to Toe Skin Assessment on the patient. ALL assessment sites listed below have been assessed. Areas assessed by both nurses:   [x]   Head, Face, and Ears   [x]   Shoulders, Back, and Chest, Abdomen  [x]   Arms, Elbows, and Hands   [x]   Coccyx, Sacrum, and Ischium  [x]   Legs, Feet, and Heels        Scattered healing abrasions and bruises. **SHARE this note so that the co-signing nurse is able to place an eSignature**    Co-signer eSignature: Electronically signed by Liana Nickerson RN on 8/4/20 at 7:36 AM EDT    Does the Patient have Skin Breakdown? No          Abraham Prevention initiated:  NA   Wound Care Orders initiated:  NA      [facility] nurse consulted for Pressure Injury (Stage 3,4, Unstageable, DTI, NWPT, Complex wounds)and New or Established Ostomies:  NA      Primary Nurse eSignature: Electronically signed by Maylene Men. Aliene Lundborg, RN on 8/3/20 at 7:40 PM EDT

## 2020-08-03 NOTE — PLAN OF CARE
Admit to PCU, tele    Hypercalcemia - 16.3 - reports ingesting ~ bottle of tums every couple of days  MICHAEL  Hypomagnesmia  N/V

## 2020-08-03 NOTE — ED NOTES
Lab called to report PANIC lab    Calcium 16.3  [place], [place] made aware.        Gifty Tejada RN  08/03/20 [id number]

## 2020-08-03 NOTE — ED PROVIDER NOTES
reviewed    REVIEW OF SYSTEMS    (2-9 systems for level 4, 10 or more for level 5)     Review of Systems   Constitutional: Negative for fever. Respiratory: Positive for cough. Negative for shortness of breath. Cardiovascular: Negative for chest pain. Gastrointestinal: Positive for abdominal pain, nausea and vomiting. Negative for diarrhea. Neurological: Negative for dizziness and syncope. Psychiatric/Behavioral: Negative for confusion. All other systems reviewed and are negative. Positives and Pertinent negatives as per HPI.        PAST MEDICAL HISTORY     Past Medical History:   Diagnosis Date    Bronchitis chronic     Chest pain     Cirrhosis of liver ([facility] [place] [address] 01/01/2017    stage 4     COPD (chronic obstructive pulmonary disease) (HCC)     COPD (chronic obstructive pulmonary disease) (HCC)     Diabetes mellitus (HCC)     Gout     Hilar adenopathy     Hyperlipidemia     Hypertension     Knee pain, right     Numbness and tingling of leg     Osteoarthritis     Paresthesia of bilateral legs     Seizures (HCC)     ongoing, began after swine flu vaccination    Substance abuse [facility])          SURGICAL HISTORY     Past Surgical History:   Procedure Laterality Date    BRONCHOSCOPY  2/7/2011    bronch with EBUS    COLONOSCOPY N/A 7/23/2018    COLONOSCOPY WITH BIOPSY performed by Channing Forrest MD at [facility] COLONOSCOPY N/A 7/23/2018    COLONOSCOPY POLYPECTOMY SNARE/COLD BIOPSY performed by Channing Forrest MD at [facility]  2/4/15    Urethral Dilatation, Resection of Bladder Tumor    CYSTOSCOPY  2/24/2016    fulgeration of bladder tumor    ELBOW SURGERY Left     ENDOSCOPY, SMALL BOWEL N/A 2/20/2019    BOWEL SMALL CAPSULE ENDOSCOPY performed by Channing Forrest MD at [place] [address]  05-    cystoscopy, bladder biopsy    OTHER SURGICAL HISTORY  09/09/2015    cystoscopy, urethral dilatation, bladder tumor follow up   [facility] 7/23/2018    EGD BIOPSY performed by Raheem Barry MD at [address] [place]       Previous Medications    ALBUTEROL SULFATE  (90 BASE) MCG/ACT INHALER    INHALE 2 PUFFS EVERY 6 HOURS AS NEEDED FOR WHEEZING    ALCOHOL SWABS (ALCOHOL PREP) 70 % PADS    USE AS DIRECTED    ALLOPURINOL (ZYLOPRIM) 300 MG TABLET    TAKE 1 TABLET BY MOUTH DAILY    ATORVASTATIN (LIPITOR) 40 MG TABLET    TAKE 1 TABLET BY MOUTH DAILY    BLOOD GLUCOSE MONITORING SUPPL (FREESTYLE FREEDOM LITE) W/DEVICE KIT    1 kit by Does not apply route daily    BLOOD PRESSURE MONITORING (BLOOD PRESSURE CUFF) MISC    Please dispense insurance preference    BUDESONIDE (PULMICORT) 0.5 MG/2ML NEBULIZER SUSPENSION    Take 2 mLs by nebulization 2 times daily    DILTIAZEM (TIAZAC) 360 MG EXTENDED RELEASE CAPSULE    TAKE 1 CAPSULE BY MOUTH DAILY    FOLIC ACID (FOLVITE) 1 MG TABLET    TAKE 1 TABLET BY MOUTH DAILY    FREESTYLE LANCETS MISC    TEST 2 TIMES DAILY    FREESTYLE LITE STRIP    USE 1 STRIP IN VITRO ROUTE 2 TIMES DAILY    GABAPENTIN (NEURONTIN) 300 MG CAPSULE    Take 900 mg by mouth 3 times daily. INCRUSE ELLIPTA 62.5 MCG/INH AEPB    INHALE 1 PUFF DAILY    LANTUS SOLOSTAR 100 UNIT/ML INJECTION PEN    INJECT 25 UNITS INTO THE SKIN DAILY WITH SUPPER    METFORMIN (GLUCOPHAGE) 500 MG TABLET    TAKE ONE TABLET BY MOUTH 2 TIMES DAILY WITH MEALS    METHOCARBAMOL (ROBAXIN) 750 MG TABLET    TAKE ONE TABLET BY MOUTH FOUR TIMES A DAY AS NEEDED FOR MUSCLE SPASMS    METOPROLOL SUCCINATE (TOPROL XL) 50 MG EXTENDED RELEASE TABLET    Take 1 tablet by mouth daily    ONDANSETRON (ZOFRAN) 8 MG TABLET    TAKE ONE TABLET BY MOUTH EVERY 8 HOURS AS NEEDED FOR NAUSEA OR VOMITING    OXYCODONE (ROXICODONE) 5 MG IMMEDIATE RELEASE TABLET    Take 5 mg by mouth every 6 hours as needed.  Indications: Chronic Pain     OXYGEN    Inhale 2 L/min into the lungs nightly as needed (and Status: He is alert and oriented to person, place, and time. GCS: GCS eye subscore is 4. GCS verbal subscore is 5. GCS motor subscore is 6. Cranial Nerves: No cranial nerve deficit. Sensory: No sensory deficit. Motor: No abnormal muscle tone. Coordination: Coordination normal.   Psychiatric:         Speech: Speech normal.         Behavior: Behavior normal.         Thought Content:  Thought content normal.         DIAGNOSTIC RESULTS   LABS:    Labs Reviewed   CBC WITH AUTO DIFFERENTIAL - Abnormal; Notable for the following components:       Result Value    WBC 15.2 (*)     Hemoglobin 12.1 (*)     Hematocrit 39.2 (*)     MCV 75.5 (*)     MCH 23.3 (*)     MCHC 30.9 (*)     RDW 19.7 (*)     Neutrophils Absolute 10.6 (*)     All other components within normal limits    Narrative:     Performed at:  [place] [address],  [place], [facility]   Phone [phone]   COMPREHENSIVE METABOLIC PANEL W/ REFLEX TO MG FOR LOW K - Abnormal; Notable for the following components:    Chloride 93 (*)     Glucose 101 (*)     BUN 37 (*)     CREATININE 2.6 (*)     GFR Non- 25 (*)     GFR African American 30 (*)     Calcium 16.3 (*)     Total Protein 8.7 (*)     Albumin/Globulin Ratio 1.0 (*)     Alkaline Phosphatase 132 (*)     All other components within normal limits    Narrative:     Yuan CESAR tel. [phone],  Chemistry results called to and read back by Babatunde Lobo RN, 08/03/2020  14:32, by Nirmala Rich  Performed at:  [place] [address],  [place], [facility]   Phone [phone]   MAGNESIUM - Abnormal; Notable for the following components:    Magnesium 1.50 (*)     All other components within normal limits    Narrative:     [address] [phone],  Chemistry results called to and read back by Babatunde Lobo RN, 08/03/2020  14:32, by Nirmala Rich  Performed at:  [facility]  [place] [address],  [place], [facility]   Phone [phone]   LIPASE - Abnormal; Notable for the following components:    Lipase 66.0 (*)     All other components within normal limits    Narrative:     Performed at:  [facility],  [place], [facility]   Phone [phone]   CALCIUM, IONIZED - Abnormal; Notable for the following components:    Calcium, Ion 1.66 (*)     All other components within normal limits    Narrative:     CALL  [place]  [facility] tel. [phone],  Chemistry results called to and read back by Yessenia Georges RN, 08/03/2020  16:20, by Natali Mendes  Performed at:  [place] [address],  [place]   Phone [phone]   URINE RT REFLEX TO CULTURE    Narrative:     Performed at:  [place] [address],  [place]   Phone [phone]   TROPONIN    Narrative:     Performed at:  [place] [address],  [place]   Phone [phone]     Results for orders placed or performed during the hospital encounter of 08/03/20   CBC auto differential   Result Value Ref Range    WBC 15.2 (H) 4.0 - 11.0 K/uL    RBC 5.19 4.20 - 5.90 M/uL    Hemoglobin 12.1 (L) 13.5 - 17.5 g/dL    Hematocrit 39.2 (L) 40.5 - 52.5 %    MCV 75.5 (L) 80.0 - 100.0 fL    MCH 23.3 (L) 26.0 - 34.0 pg    MCHC 30.9 (L) 31.0 - 36.0 g/dL    RDW 19.7 (H) 12.4 - 15.4 %    Platelets 081 872 - 356 K/uL    MPV 7.7 5.0 - 10.5 fL    Neutrophils % 69.3 %    Lymphocytes % 19.5 %    Monocytes % 8.3 %    Eosinophils % 1.9 %    Basophils % 1.0 %    Neutrophils Absolute 10.6 (H) 1.7 - 7.7 K/uL    Lymphocytes Absolute 3.0 1.0 - 5.1 K/uL    Monocytes Absolute 1.3 0.0 - 1.3 K/uL    Eosinophils Absolute 0.3 0.0 - 0.6 K/uL    Basophils Absolute 0.1 0.0 - 0.2 K/uL   Comprehensive Metabolic Panel w/ Reflex to MG   Result Value Ref Range    Sodium 136 136 - Factors   The patient risk factors include:peripheral arterial disease, obesity,  Current/Recent(w/in 1 year) tobacco use, treated hypercholesterolemia,  treated hypertension, orally-treated diabetes mellitus, chronic lung disease  and (pack years: 32). Conclusions   Summary  Normal LV function. There is uniform isotope uptake at stress and rest. There is no clear  evidence of myocardial ischemia. Stress Protocols   Resting ECG  NSR, left axis, poor r wave progression  first deg av block   Resting HR:90 bpm   Resting BP:173/70 mmHg  Stress Protocol:Pharmacologic - Lexiscan's  Peak HR:120 bpm                              HR/BP product:66760  Peak BP:178/73 mmHg  Predicted HR: 157 bpm  % of predicted HR: 76  Test duration: 4 min  Reason for termination:Completed   ECG Findings  EKGs are similar to baseline ekgs, overall nondiagnostic for ischemia   Arrhythmias  None   Symptoms  Patient developed shortness of breath, and nausea likely related to  lexiscan. Complications  Procedure complication was none. Stress Interpretation  Normal LVEF >60%  Grossly normal wall motion, but difficult to evaluate septum, cannot  exclude septal wall motion abnormality  Possible diaphragmatic attenuation artifact  No ischemia  Procedure Medications   - Lexiscan 0.4 mg.  Imaging Protocols   - One Day   Rest                          Stress   Isotope:Myoview/Tetrofosmin   Isotope: Myoview/Tetrofosmin  Isotope dose:11.5 mCi         Isotope dose:32.1 mCi  Administration Route:I.V.      Administration Route:I.V.  Date:07/29/2020 08:50         Date:07/29/2020 10:40                                 Technique:      Gated  Imaging Results    Stress ejection    Ejection fraction:71 %    EDV :82 ml    ESV :24 ml    Stroke volume :58 ml    LV mass :114 gr  Medical History   Additional Medical History   Hx-Seizures and Cirrhosis   Signatures   ------------------------------------------------------------------  Electronically signed by Amparo Moreno David Mcdonald MD (Interpreting  physician) on 07/29/2020 at 14:57  ------------------------------------------------------------------            PROCEDURES   Unless otherwise noted below, none     Procedures    CRITICAL CARE TIME   N/A    CONSULTS:  IP CONSULT TO HOSPITALIST      EMERGENCY DEPARTMENT COURSE and DIFFERENTIAL DIAGNOSIS/MDM:   Vitals:    Vitals:    08/03/20 1504 08/03/20 1534 08/03/20 1604 08/03/20 1634   BP: (!) 141/57 (!) 146/57 (!) 135/50 (!) 129/57   Pulse: (!) 45 (!) 43 (!) 47 (!) 46   Resp: 21 18 22 17   Temp:       TempSrc:       SpO2: 93% 95% 96% 91%   Weight:       Height:           Patient was given thefollowing medications:  Medications   0.9 % sodium chloride bolus (1,000 mLs Intravenous New Bag 8/3/20 1519)   ondansetron (ZOFRAN) injection 4 mg (4 mg Intravenous Given 8/3/20 1519)   0.9 % sodium chloride bolus (0 mLs Intravenous Stopped 8/3/20 1614)   magnesium sulfate 1 g in dextrose 5% 100 mL IVPB (1 g Intravenous New Bag 8/3/20 1609)       4:36 PM EDT  Patient with acute kidney injury creatinine 2.6. He has had nausea and vomiting. Hydrated with 2 L normal saline. Zofran given for nausea. CT abdomen no acute process. Elevated calcium at 16.3. Patient states he takes 1 bottle of Tums every 3 days we discussed stopping this. Magnesium 1.5 given 1 g IV replacement in ER. Plan for admission. Patient is stable. Spoke with Harish Thomas with hospitalist and discussed symptoms, exam, objective data and clinical course. Disposition and plan agreed upon. She will admit the patient and give/enter admitting orders. FINAL IMPRESSION      1. Hypercalcemia    2. MICHAEL (acute kidney injury) ([facility] [address]    3. Nausea and vomiting in adult    4. Dehydration    5. Hypomagnesemia    6. Liver disease, chronic, with cirrhosis ([address]          DISPOSITION/PLAN   DISPOSITION Admitted    PATIENT REFERREDTO:  Reina Mejia MD  [facility].  [facility]  [phone]            DISCHARGE MEDICATIONS:  New Prescriptions    No medications on file       DISCONTINUED MEDICATIONS:  Discontinued Medications    No medications on file              (Please note that portions ofthis note were completed with a voice recognition program.  Efforts were made to edit the dictations but occasionally words are mis-transcribed.)    Hayden Vargas PA-C (electronically signed)            Geraldine Leo PA-C  08/03/20 3628

## 2020-08-03 NOTE — TELEPHONE ENCOUNTER
Called patient and left him a message regarding cancelling surgery per Dr Cj Sebastian. Patient has a high calcium level and Dr Renzo Mendiola will be calling patient on how to address this. [email or web address]

## 2020-08-03 NOTE — TELEPHONE ENCOUNTER
Pt had STAT testing done for elevated calcium and results came magaña high again. Calcium was 16.2 and Ionized Calcium was 1.83. Dr Candi Pearl informed and states he wants pt to go to ER for evaluation. Pt informed and after a bit of convincing pt is going to go to [facility].  Spoke with ER and informed pt is coming

## 2020-08-03 NOTE — ED NOTES
Pt states does not know home medications. Pt states there have been no changes in the past few weeks.      Janice Aldrich RN  08/03/20 0051

## 2020-08-04 LAB
ANION GAP SERPL CALCULATED.3IONS-SCNC: 10 MMOL/L (ref 3–16)
ANION GAP SERPL CALCULATED.3IONS-SCNC: 9 MMOL/L (ref 3–16)
BASOPHILS ABSOLUTE: 0 K/UL (ref 0–0.2)
BASOPHILS RELATIVE PERCENT: 0.5 %
BUN BLDV-MCNC: 30 MG/DL (ref 7–20)
BUN BLDV-MCNC: 34 MG/DL (ref 7–20)
CALCIUM SERPL-MCNC: 12.3 MG/DL (ref 8.3–10.6)
CALCIUM SERPL-MCNC: 12.5 MG/DL (ref 8.3–10.6)
CHLORIDE BLD-SCNC: 100 MMOL/L (ref 99–110)
CHLORIDE BLD-SCNC: 100 MMOL/L (ref 99–110)
CO2: 28 MMOL/L (ref 21–32)
CO2: 30 MMOL/L (ref 21–32)
CREAT SERPL-MCNC: 2.4 MG/DL (ref 0.8–1.3)
CREAT SERPL-MCNC: 2.5 MG/DL (ref 0.8–1.3)
EOSINOPHILS ABSOLUTE: 0.3 K/UL (ref 0–0.6)
EOSINOPHILS RELATIVE PERCENT: 3.3 %
ESTIMATED AVERAGE GLUCOSE: 142.7 MG/DL
GFR AFRICAN AMERICAN: 32
GFR AFRICAN AMERICAN: 33
GFR NON-AFRICAN AMERICAN: 26
GFR NON-AFRICAN AMERICAN: 27
GLUCOSE BLD-MCNC: 103 MG/DL (ref 70–99)
GLUCOSE BLD-MCNC: 104 MG/DL (ref 70–99)
GLUCOSE BLD-MCNC: 105 MG/DL (ref 70–99)
GLUCOSE BLD-MCNC: 114 MG/DL (ref 70–99)
GLUCOSE BLD-MCNC: 120 MG/DL (ref 70–99)
GLUCOSE BLD-MCNC: 99 MG/DL (ref 70–99)
HBA1C MFR BLD: 6.6 %
HCT VFR BLD CALC: 32.4 % (ref 40.5–52.5)
HEMOGLOBIN: 10 G/DL (ref 13.5–17.5)
LYMPHOCYTES ABSOLUTE: 2.3 K/UL (ref 1–5.1)
LYMPHOCYTES RELATIVE PERCENT: 27.6 %
MAGNESIUM: 1.5 MG/DL (ref 1.8–2.4)
MAGNESIUM: 1.5 MG/DL (ref 1.8–2.4)
MAGNESIUM: 2.1 MG/DL (ref 1.8–2.4)
MCH RBC QN AUTO: 23.4 PG (ref 26–34)
MCHC RBC AUTO-ENTMCNC: 31 G/DL (ref 31–36)
MCV RBC AUTO: 75.4 FL (ref 80–100)
MONOCYTES ABSOLUTE: 0.7 K/UL (ref 0–1.3)
MONOCYTES RELATIVE PERCENT: 8.6 %
NEUTROPHILS ABSOLUTE: 4.9 K/UL (ref 1.7–7.7)
NEUTROPHILS RELATIVE PERCENT: 60 %
PDW BLD-RTO: 19.6 % (ref 12.4–15.4)
PERFORMED ON: ABNORMAL
PLATELET # BLD: 260 K/UL (ref 135–450)
PMV BLD AUTO: 7.8 FL (ref 5–10.5)
POTASSIUM REFLEX MAGNESIUM: 3.2 MMOL/L (ref 3.5–5.1)
POTASSIUM REFLEX MAGNESIUM: 3.3 MMOL/L (ref 3.5–5.1)
RBC # BLD: 4.29 M/UL (ref 4.2–5.9)
SODIUM BLD-SCNC: 138 MMOL/L (ref 136–145)
SODIUM BLD-SCNC: 139 MMOL/L (ref 136–145)
WBC # BLD: 8.2 K/UL (ref 4–11)

## 2020-08-04 PROCEDURE — 6370000000 HC RX 637 (ALT 250 FOR IP): Performed by: INTERNAL MEDICINE

## 2020-08-04 PROCEDURE — 80048 BASIC METABOLIC PNL TOTAL CA: CPT

## 2020-08-04 PROCEDURE — 99223 1ST HOSP IP/OBS HIGH 75: CPT | Performed by: INTERNAL MEDICINE

## 2020-08-04 PROCEDURE — 6360000002 HC RX W HCPCS: Performed by: PHYSICIAN ASSISTANT

## 2020-08-04 PROCEDURE — 2700000000 HC OXYGEN THERAPY PER DAY

## 2020-08-04 PROCEDURE — 6370000000 HC RX 637 (ALT 250 FOR IP): Performed by: PHYSICIAN ASSISTANT

## 2020-08-04 PROCEDURE — 83735 ASSAY OF MAGNESIUM: CPT

## 2020-08-04 PROCEDURE — 2580000003 HC RX 258: Performed by: PHYSICIAN ASSISTANT

## 2020-08-04 PROCEDURE — 36415 COLL VENOUS BLD VENIPUNCTURE: CPT

## 2020-08-04 PROCEDURE — 6360000002 HC RX W HCPCS: Performed by: INTERNAL MEDICINE

## 2020-08-04 PROCEDURE — 94640 AIRWAY INHALATION TREATMENT: CPT

## 2020-08-04 PROCEDURE — 85025 COMPLETE CBC W/AUTO DIFF WBC: CPT

## 2020-08-04 PROCEDURE — 94761 N-INVAS EAR/PLS OXIMETRY MLT: CPT

## 2020-08-04 PROCEDURE — 2580000003 HC RX 258: Performed by: INTERNAL MEDICINE

## 2020-08-04 PROCEDURE — 2060000000 HC ICU INTERMEDIATE R&B

## 2020-08-04 RX ORDER — POTASSIUM CHLORIDE 20 MEQ/1
40 TABLET, EXTENDED RELEASE ORAL PRN
Status: DISCONTINUED | OUTPATIENT
Start: 2020-08-04 | End: 2020-08-06 | Stop reason: HOSPADM

## 2020-08-04 RX ORDER — POTASSIUM CHLORIDE 7.45 MG/ML
10 INJECTION INTRAVENOUS PRN
Status: DISCONTINUED | OUTPATIENT
Start: 2020-08-04 | End: 2020-08-06 | Stop reason: HOSPADM

## 2020-08-04 RX ORDER — INSULIN GLARGINE 100 [IU]/ML
15 INJECTION, SOLUTION SUBCUTANEOUS NIGHTLY
Status: DISCONTINUED | OUTPATIENT
Start: 2020-08-04 | End: 2020-08-06 | Stop reason: HOSPADM

## 2020-08-04 RX ORDER — HYDROMORPHONE HYDROCHLORIDE 2 MG/1
2 TABLET ORAL EVERY 6 HOURS PRN
Status: ON HOLD | COMMUNITY
End: 2020-08-06 | Stop reason: HOSPADM

## 2020-08-04 RX ORDER — NAPROXEN 500 MG/1
500 TABLET ORAL 2 TIMES DAILY PRN
Status: ON HOLD | COMMUNITY
End: 2020-08-06 | Stop reason: HOSPADM

## 2020-08-04 RX ORDER — POTASSIUM CHLORIDE 20 MEQ/1
20 TABLET, EXTENDED RELEASE ORAL ONCE
Status: COMPLETED | OUTPATIENT
Start: 2020-08-04 | End: 2020-08-04

## 2020-08-04 RX ORDER — MAGNESIUM SULFATE 1 G/100ML
1 INJECTION INTRAVENOUS PRN
Status: DISCONTINUED | OUTPATIENT
Start: 2020-08-04 | End: 2020-08-06 | Stop reason: HOSPADM

## 2020-08-04 RX ORDER — OXYCODONE HYDROCHLORIDE 5 MG/1
5 TABLET ORAL EVERY 6 HOURS PRN
Status: DISCONTINUED | OUTPATIENT
Start: 2020-08-04 | End: 2020-08-06 | Stop reason: HOSPADM

## 2020-08-04 RX ORDER — INSULIN GLARGINE 100 [IU]/ML
15 INJECTION, SOLUTION SUBCUTANEOUS NIGHTLY
COMMUNITY
End: 2021-08-20

## 2020-08-04 RX ORDER — SODIUM CHLORIDE 9 MG/ML
INJECTION, SOLUTION INTRAVENOUS CONTINUOUS
Status: ACTIVE | OUTPATIENT
Start: 2020-08-04 | End: 2020-08-04

## 2020-08-04 RX ORDER — NALOXONE HYDROCHLORIDE 4 MG/.1ML
1 SPRAY NASAL PRN
COMMUNITY

## 2020-08-04 RX ADMIN — BUDESONIDE 500 MCG: 0.5 SUSPENSION RESPIRATORY (INHALATION) at 20:15

## 2020-08-04 RX ADMIN — PROMETHAZINE HYDROCHLORIDE 12.5 MG: 25 TABLET ORAL at 21:51

## 2020-08-04 RX ADMIN — SODIUM CHLORIDE: 9 INJECTION, SOLUTION INTRAVENOUS at 11:10

## 2020-08-04 RX ADMIN — POTASSIUM CHLORIDE 40 MEQ: 1500 TABLET, EXTENDED RELEASE ORAL at 09:30

## 2020-08-04 RX ADMIN — METOPROLOL SUCCINATE 50 MG: 50 TABLET, EXTENDED RELEASE ORAL at 09:30

## 2020-08-04 RX ADMIN — INSULIN GLARGINE 15 UNITS: 100 INJECTION, SOLUTION SUBCUTANEOUS at 21:51

## 2020-08-04 RX ADMIN — POTASSIUM CHLORIDE 20 MEQ: 1500 TABLET, EXTENDED RELEASE ORAL at 14:46

## 2020-08-04 RX ADMIN — MAGNESIUM SULFATE HEPTAHYDRATE 1 G: 1 INJECTION, SOLUTION INTRAVENOUS at 06:01

## 2020-08-04 RX ADMIN — MAGNESIUM SULFATE HEPTAHYDRATE 1 G: 1 INJECTION, SOLUTION INTRAVENOUS at 07:42

## 2020-08-04 RX ADMIN — FOLIC ACID 1 MG: 1 TABLET ORAL at 09:30

## 2020-08-04 RX ADMIN — SODIUM CHLORIDE: 9 INJECTION, SOLUTION INTRAVENOUS at 13:02

## 2020-08-04 RX ADMIN — BUDESONIDE 500 MCG: 0.5 SUSPENSION RESPIRATORY (INHALATION) at 07:56

## 2020-08-04 RX ADMIN — ENOXAPARIN SODIUM 30 MG: 30 INJECTION, SOLUTION INTRAVENOUS; SUBCUTANEOUS at 21:52

## 2020-08-04 RX ADMIN — SODIUM CHLORIDE: 9 INJECTION, SOLUTION INTRAVENOUS at 18:02

## 2020-08-04 RX ADMIN — ATORVASTATIN CALCIUM 40 MG: 40 TABLET, FILM COATED ORAL at 09:29

## 2020-08-04 RX ADMIN — PANTOPRAZOLE SODIUM 40 MG: 40 TABLET, DELAYED RELEASE ORAL at 09:30

## 2020-08-04 NOTE — FLOWSHEET NOTE
08/04/20 1248   Encounter Summary   Services provided to: Patient   Referral/Consult From: Nurse   Support System Significant other;Family members   Place of [facility]   ([address])   Continue Visiting   (8-4, AD info.)   Complexity of Encounter Moderate   Length of Encounter 15 minutes   Routine   Type Initial   Assessment Calm; Approachable   Intervention Active listening;Explored feelings, thoughts, concerns;Nurtured hope   Outcome Engaged in [employer] (For Healthcare)   Healthcare Directive No, patient does not have an advance directive for healthcare treatment   Advance Directives Documents given;Documents explained;Pt. not interested at this time    provided forms and education on advance directives. Patient has a long term significant other as well as adult children. I explained the [place] proxy decision-making order and the value of completing advance directives. He states he will talk to his sister and look the forms over. Provided forms and education and contact information if he desires further help completing. PRN follow-up.

## 2020-08-04 NOTE — PROGRESS NOTES
RESPIRATORY THERAPY ASSESSMENT    Name:  Trevon Garcia Record Number:  [id number]  Age: 62 y.o. Gender: male  : 1955  Today's Date:  8/3/2020  Room:  /0317-01    Assessment     Is the patient being admitted for a COPD or Asthma exacerbation? No   (If yes the patient will be seen every 4 hours for the first 24 hours and then reassessed)    Patient Admission Diagnosis      Allergies  Allergies   Allergen Reactions    Lisinopril Swelling    Ace Inhibitors Swelling    Influenza Vaccines      He states he was hospitalized when he received his first flu vaccine    Spiriva Handihaler [Tiotropium Bromide Monohydrate] Swelling     Tolerates both tudorza and incruse       Minimum Predicted Vital Capacity:               Actual Vital Capacity:                    Pulmonary History:COPD  Home Oxygen Therapy:  2.5 liters/min via nasal cannula at night. Home Respiratory Therapy:Albuterol and Umeclidinium Bromide   Current Respiratory Therapy:  Albuterol 2PUFFS Q6PRN,  Pulmicort BID. Treatment Type: [facility]  Medications: Budesonide    Respiratory Severity Index(RSI)   Patients with orders for inhalation medications, oxygen, or any therapeutic treatment modality will be placed on Respiratory Protocol. They will be assessed with the first treatment and at least every 72 hours thereafter. The following severity scale will be used to determine frequency of treatment intervention.     Smoking History: Pulmonary Disease or Smoking History, Greater than 15 pack year = 2    Social History  Social History     Tobacco Use    Smoking status: Former Smoker     Packs/day: 0.50     Years: 54.00     Pack years: 27.00     Types: Cigarettes, Cigars     Start date: 2018     Last attempt to quit: 2020     Years since quittin.0    Smokeless tobacco: Former User     Types: Snuff    Tobacco comment: less than 1/2 pk   Substance Use Topics    Alcohol use: Not Currently     Alcohol/week: 2.0 standard drinks Types: 2 Cans of beer per week     Frequency: Monthly or less    Drug use: No       Recent Surgical History: None = 0  Past Surgical History  Past Surgical History:   Procedure Laterality Date    BRONCHOSCOPY  2/7/2011    bronch with EBUS    COLONOSCOPY N/A 7/23/2018    COLONOSCOPY WITH BIOPSY performed by Parag Palmer MD at [address] [facility] COLONOSCOPY N/A 7/23/2018    COLONOSCOPY POLYPECTOMY SNARE/COLD BIOPSY performed by Parag Palmer MD at [address]  2/4/15    Urethral Dilatation, Resection of Bladder Tumor    CYSTOSCOPY  2/24/2016    fulgeration of bladder tumor    ELBOW SURGERY Left     ENDOSCOPY, SMALL BOWEL N/A 2/20/2019    BOWEL SMALL CAPSULE ENDOSCOPY performed by Parag Palmer MD at [place] [address]  05-    cystoscopy, bladder biopsy    OTHER SURGICAL HISTORY  09/09/2015    cystoscopy, urethral dilatation, bladder tumor follow up   [address] N/A 7/23/2018    EGD BIOPSY performed by Parag Palmer MD at [facility]       Level of Consciousness: Alert, Oriented, and Cooperative = 0    Level of Activity: Walking with assistance = 1    Respiratory Pattern: Regular Pattern; RR 8-20 = 0    Breath Sounds: Diminshed bilaterally and/or crackles = 2    Sputum   ,  , Sputum How Obtained: Spontaneous cough  Cough: Strong, spontaneous, non-productive = 0    Vital Signs   /61   Pulse 53   Temp 97.1 °F (36.2 °C) (Oral)   Resp 16   Ht 5' 7\" (1.702 m)   Wt 206 lb 3.2 oz (93.5 kg)   SpO2 93%   BMI 32.30 kg/m²   SPO2 (COPD values may differ): 90-91% on room air or greater than 92% on FiO2 24- 28% = 1    Peak Flow (asthma only): not applicable = 0    RSI: 5-6 = Q4hr PRN (every four hours as needed) for dyspnea        Plan       Goals: medication delivery    Patient/caregiver was educated on the proper method of use for Respiratory Care Devices:  Yes      Level of the patient does not have documented COPD, consider discontinuing anticholinergics when RSI is less than 9.  3. If the bronchospasm worsens (increased RSI), then the bronchodilator frequency can be increased to a maximum of every 4 hours. If greater than every 4 hours is required, the physician will be contacted. 4. If the bronchospasm improves, the frequency of the bronchodilator can be decreased, based on the patient's RSI, but not less than home treatment regimen frequency. 5. Bronchodilator(s) will be discontinued if patient has a RSI less than 9 and has received no scheduled or as needed treatment for 72  Hrs. Patients Ordered on a Mucolytic Agent:    1. Must always be administered with a bronchodilator. 2. Discontinue if patient experiences worsened bronchospasm, or secretions have lessened to the point that the patient is able to clear them with a cough. Anti-inflammatory and Combination Medications:    1. If the patient lacks prior history of lung disease, is not using inhaled anti-inflammatory medication at home, and lacks wheezing by examination or by history for at least 24 hours, contact physician for possible discontinuation.

## 2020-08-04 NOTE — PROGRESS NOTES
Pt assessed. Diminished to auscultation in bilateral lobes. Scheduled medications given at this time. Pt mentioned that he only takes 15 units of his \"shot\" each night. Further discussion led to clarification of him talking about his lantus and that he only gives himself 15 units even though it was ordered as 25 units nightly here. Discussed the importance of keeping an updated medication list with him so side effects do not occur. Pt denies any further needs. Call light and bedside table with-in easy reach. Roz Beaulieu

## 2020-08-04 NOTE — TELEPHONE ENCOUNTER
Received a call from hospital regarding this earlier. Pt stated he is taking 15 units of Lantus nightly, chart shows he's at 25 units. After looking through chart pt was told to decrease to 15 units per visit note from 1/24/2020. Med list wasn't updated. Pharmacy is needing a new Rx with the correct dosage sent to them. Lantus Solostar 15 units nightly to [address] Please advise.

## 2020-08-04 NOTE — PROGRESS NOTES
Received medication list from [address] [place] [address]. Then called Dr. Penny Grullon office to verify and back in January it was noted in his chart that he advised patient to cut dose down to 15 units. Updated medication list in epic. Will discuss with Dr. Isabel Stroud. Roz Guerrero

## 2020-08-04 NOTE — H&P
ENDOSCOPY    COLONOSCOPY N/A 7/23/2018    COLONOSCOPY POLYPECTOMY SNARE/COLD BIOPSY performed by Dorrine Dance, MD at [address]  2/4/15    Urethral Dilatation, Resection of Bladder Tumor    CYSTOSCOPY  2/24/2016    fulgeration of bladder tumor    ELBOW SURGERY Left     ENDOSCOPY, SMALL BOWEL N/A 2/20/2019    BOWEL SMALL CAPSULE ENDOSCOPY performed by Dorrine Dance, MD at [place] [address]  05-    cystoscopy, bladder biopsy    OTHER SURGICAL HISTORY  09/09/2015    cystoscopy, urethral dilatation, bladder tumor follow up   [address] N/A 7/23/2018    EGD BIOPSY performed by Dorrine Dance, MD at [address] [facility]       Medications Prior to Admission:    Prior to Admission medications    Medication Sig Start Date End Date Taking?  Authorizing Provider   INCRUSE ELLIPTA 62.5 MCG/INH AEPB INHALE 1 PUFF DAILY 8/3/20   Duglas Nunez MD   LANTUS SOLOSTAR 100 UNIT/ML injection pen INJECT 25 UNITS INTO THE SKIN DAILY WITH SUPPER 7/14/20   Duglas Nunez MD   ondansetron (ZOFRAN) 8 MG tablet TAKE ONE TABLET BY MOUTH EVERY 8 HOURS AS NEEDED FOR NAUSEA OR VOMITING 6/24/20   Duglas Nunez MD   spironolactone (ALDACTONE) 50 MG tablet TAKE 1 TABLET BY MOUTH TWICE DAILY  Patient taking differently: Take 50 mg by mouth 2 times daily TAKE 1 TABLET BY MOUTH TWICE DAILY 6/10/20   Duglas Nunez MD   metFORMIN (GLUCOPHAGE) 500 MG tablet TAKE ONE TABLET BY MOUTH 2 TIMES DAILY WITH MEALS 5/22/20   Joni Medici, APRN - CNP   pantoprazole (PROTONIX) 40 MG tablet Take 1 tablet by mouth every morning (before breakfast) 5/4/20   Duglas Nunez MD   FREESTYLE LITE strip USE 1 STRIP IN VITRO ROUTE 2 TIMES DAILY 3/17/20   Duglas Nunez MD   Alcohol Swabs (ALCOHOL PREP) 70 % PADS USE AS DIRECTED 2/25/20   Duglas Nunez MD   gabapentin ([address] [facility]) 300 MG capsule Take 900 mg by mouth 3 times daily.   1/20/20   Historical Provider, MD   metoprolol succinate (TOPROL XL) 50 MG extended release tablet Take 1 tablet by mouth daily 1/24/20   Gilda Doshi MD   vitamin B-1 (THIAMINE) 100 MG tablet TAKE 1 TABLET BY MOUTH DAILY 1/20/20   Gilda Doshi MD   atorvastatin (LIPITOR) 40 MG tablet TAKE 1 TABLET BY MOUTH DAILY  Patient taking differently: Take 40 mg by mouth daily  1/20/20   Gilda Doshi MD   allopurinol (ZYLOPRIM) 300 MG tablet TAKE 1 TABLET BY MOUTH DAILY  Patient taking differently: Take 300 mg by mouth daily  1/20/20   MD BRITTA RubioYLE LANCETS MISC TEST 2 TIMES DAILY 1/7/20   Gilda Doshi MD   albuterol sulfate  (90 Base) MCG/ACT inhaler INHALE 2 PUFFS EVERY 6 HOURS AS NEEDED FOR WHEEZING 12/31/19   Gilda Doshi MD   diltiazem [facility]) 360 MG extended release capsule TAKE 1 CAPSULE BY MOUTH DAILY  Patient taking differently: Take 360 mg by mouth daily TAKE 1 CAPSULE BY MOUTH DAILY 12/31/19   Gilda Doshi MD   folic acid (FOLVITE) 1 MG tablet TAKE 1 TABLET BY MOUTH DAILY  Patient taking differently: 1 mg daily  12/31/19   Gilda Doshi MD   methocarbamol (ROBAXIN) 750 MG tablet TAKE ONE TABLET BY MOUTH FOUR TIMES A DAY AS NEEDED FOR MUSCLE SPASMS  Patient taking differently: Take 750 mg by mouth 4 times daily as needed  11/11/19   Gilda Doshi MD   Blood Pressure Monitoring (BLOOD PRESSURE CUFF) MISC Please dispense insurance preference 7/18/19   Gilda Doshi MD   budesonide (PULMICORT) 0.5 MG/2ML nebulizer suspension Take 2 mLs by nebulization 2 times daily 7/9/19   Gilda Doshi MD   UNIFINE PENTIPS 31G X 8 MM MISC USE ONCE DAILY WITH INJECTION 1/20/19   Gilda Doshi MD   Blood Glucose Monitoring Suppl (FREESTYLE FREEDOM LITE) w/Device KIT 1 kit by Does not apply route daily 12/14/18 Trini Ventura, APRN - CNP   oxyCODONE (ROXICODONE) 5 MG immediate release tablet Take 5 mg by mouth every 6 hours as needed. Indications: Chronic Pain  8/10/17   Historical Provider, MD   OXYGEN Inhale 2 L/min into the lungs nightly as needed (and daily prn)     Historical Provider, MD       Allergies:  Lisinopril; Ace inhibitors; Influenza vaccines; and Spiriva handihaler [tiotropium bromide monohydrate]    Social History:    TOBACCO:   reports that he quit smoking 6 days ago. His smoking use included cigarettes and cigars. He started smoking about 23 months ago. He has a 27.00 pack-year smoking history. He has quit using smokeless tobacco.  His smokeless tobacco use included snuff. ETOH:   reports previous alcohol use of about 2.0 standard drinks of alcohol per week. Family History:   Positive as follows:        Problem Relation Age of Onset   [address] Cancer Mother         Lung    Emphysema Father     Diabetes Sister     Hypertension Sister     Cancer Brother         throat    Asthma Neg Hx        REVIEW OF SYSTEMS:     Constitutional: Negative for fever   HENT: Negative for sore throat   Eyes: Negative for redness   Respiratory: Negative  for dyspnea, cough   Cardiovascular: Negative for chest pain   Gastrointestinal: + for abdominal pain , nausea and vomiting  Genitourinary: Negative for hematuria   Musculoskeletal: Negative for arthralgias   Skin: Negative for rash   Neurological: Negative for syncope   Hematological: Negative for adenopathy   Psychiatric/Behavorial: Negative for anxiety    PHYSICAL EXAM:    /63   Pulse 61   Temp 97.5 °F (36.4 °C) (Oral)   Resp 16   Ht 5' 7\" (1.702 m)   Wt 206 lb 1.6 oz (93.5 kg)   SpO2 95%   BMI 32.28 kg/m²     Gen: No distress. Alert. Eyes: PERRL. No sclera icterus. No conjunctival injection. ENT: No discharge. Pharynx clear. Neck: No JVD. No Carotid Bruit. Trachea midline. Resp: No accessory muscle use. No crackles. No wheezes. No rhonchi. CV: Regular rate. Regular rhythm. No murmur. No rub. No edema. GI: Non-tender. Non-distended. No masses. No organomegaly. Normal bowel sounds. No hernia. Skin: Warm and dry. No nodule on exposed extremities. No rash on exposed extremities. M/S: No cyanosis. No joint deformity. No clubbing. Neuro: Awake. Grossly nonfocal    Psych: Oriented x 3. No anxiety or agitation. CBC:   Recent Labs     08/03/20  1405 08/04/20  0439   WBC 15.2* 8.2   HGB 12.1* 10.0*   HCT 39.2* 32.4*   MCV 75.5* 75.4*    260     BMP:   Recent Labs     08/03/20  1405 08/03/20  2247 08/04/20  0439    138 139   K 3.5 3.2* 3.2*   CL 93* 97* 100   CO2 32 33* 30   BUN 37* 37* 34*   CREATININE 2.6* 2.5* 2.5*     LIVER PROFILE:   Recent Labs     08/03/20  1405   AST 20   ALT 19   LIPASE 66.0*   BILITOT <0.2   ALKPHOS 132*     UA:  Recent Labs     08/03/20  1528   COLORU Yellow   PHUR 6.0   CLARITYU Clear   SPECGRAV 1.020   LEUKOCYTESUR Negative   UROBILINOGEN 0.2   BILIRUBINUR Negative   BLOODU Negative   GLUCOSEU Negative       CARDIAC ENZYMES  Recent Labs     08/03/20  1405   TROPONINI <0.01     CULTURES  N/A    EKG:  I have reviewed the EKG with the following interpretation:   Sinus bradycardia with 1st degree A-V block, Left axis deviation, Nonspecific T wave abnormality    RADIOLOGY  CT ABDOMEN PELVIS WO CONTRAST   Final Result   No CT evidence of acute intraabdominal pathology. Active Problems:    Hypercalcemia  Resolved Problems:    * No resolved hospital problems. *        ASSESSMENT/PLAN:    Hypercalcemia  -Patient had severe hypercalcemia , calcium levels 18-->16--> 12.5 with IVFs. - possibly related to taking 1 bottle of Tums every 3 days and dehydration.   -Continue aggressive IV hydration, monitoring telemetry, monitor calcium level closely    MICHAEL  - likely pre-renal  -Creatinine 2.6 on admission  - continue IVFs. Hypokalemia  Hypomagnesemia  - replace electrolytes  - monitor labs. Leukocytosis  - likely reactive  - resolved on repeat. Cirrhosis  - Held Aldactone. Hypertension  - BP stable. Continue Toprol-XL. Hyperlipidemia  - on Atorvastatin. COPD  - stable. No AE.  - continue Pulmicort, Albuterol prn. DM type 2  - monitor glucose. Continue Lantus, SSI coverage. Gout  - stable. No Acute flare. PVD  - to have Aortofemoral bypass graft with Dr. Adrián Rodriguez.   - on statin. GERD  - on PPI.      DVT Prophylaxis: Lovenox  Diet: DIET CARB CONTROL;  Code Status: Full Code        Tiburcio Guillen MD   8/4/2020

## 2020-08-05 LAB
ANION GAP SERPL CALCULATED.3IONS-SCNC: 8 MMOL/L (ref 3–16)
BUN BLDV-MCNC: 25 MG/DL (ref 7–20)
CALCIUM SERPL-MCNC: 11 MG/DL (ref 8.3–10.6)
CHLORIDE BLD-SCNC: 103 MMOL/L (ref 99–110)
CO2: 28 MMOL/L (ref 21–32)
CREAT SERPL-MCNC: 2.1 MG/DL (ref 0.8–1.3)
GFR AFRICAN AMERICAN: 39
GFR NON-AFRICAN AMERICAN: 32
GLUCOSE BLD-MCNC: 101 MG/DL (ref 70–99)
GLUCOSE BLD-MCNC: 122 MG/DL (ref 70–99)
GLUCOSE BLD-MCNC: 80 MG/DL (ref 70–99)
GLUCOSE BLD-MCNC: 83 MG/DL (ref 70–99)
GLUCOSE BLD-MCNC: 85 MG/DL (ref 70–99)
GLUCOSE BLD-MCNC: 90 MG/DL (ref 70–99)
MAGNESIUM: 1.8 MG/DL (ref 1.8–2.4)
PERFORMED ON: ABNORMAL
PERFORMED ON: ABNORMAL
PERFORMED ON: NORMAL
POTASSIUM REFLEX MAGNESIUM: 3.2 MMOL/L (ref 3.5–5.1)
SODIUM BLD-SCNC: 139 MMOL/L (ref 136–145)

## 2020-08-05 PROCEDURE — 6370000000 HC RX 637 (ALT 250 FOR IP): Performed by: INTERNAL MEDICINE

## 2020-08-05 PROCEDURE — 2700000000 HC OXYGEN THERAPY PER DAY

## 2020-08-05 PROCEDURE — 2060000000 HC ICU INTERMEDIATE R&B

## 2020-08-05 PROCEDURE — 6370000000 HC RX 637 (ALT 250 FOR IP): Performed by: PHYSICIAN ASSISTANT

## 2020-08-05 PROCEDURE — 2580000003 HC RX 258: Performed by: PHYSICIAN ASSISTANT

## 2020-08-05 PROCEDURE — 6360000002 HC RX W HCPCS: Performed by: PHYSICIAN ASSISTANT

## 2020-08-05 PROCEDURE — 80048 BASIC METABOLIC PNL TOTAL CA: CPT

## 2020-08-05 PROCEDURE — 83735 ASSAY OF MAGNESIUM: CPT

## 2020-08-05 PROCEDURE — 94761 N-INVAS EAR/PLS OXIMETRY MLT: CPT

## 2020-08-05 PROCEDURE — 6360000002 HC RX W HCPCS: Performed by: INTERNAL MEDICINE

## 2020-08-05 PROCEDURE — 99232 SBSQ HOSP IP/OBS MODERATE 35: CPT | Performed by: INTERNAL MEDICINE

## 2020-08-05 PROCEDURE — 94640 AIRWAY INHALATION TREATMENT: CPT

## 2020-08-05 PROCEDURE — 36415 COLL VENOUS BLD VENIPUNCTURE: CPT

## 2020-08-05 RX ORDER — HYDRALAZINE HYDROCHLORIDE 20 MG/ML
10 INJECTION INTRAMUSCULAR; INTRAVENOUS ONCE
Status: COMPLETED | OUTPATIENT
Start: 2020-08-05 | End: 2020-08-05

## 2020-08-05 RX ORDER — ALBUTEROL SULFATE 90 UG/1
2 AEROSOL, METERED RESPIRATORY (INHALATION) DAILY
Status: DISCONTINUED | OUTPATIENT
Start: 2020-08-05 | End: 2020-08-06 | Stop reason: HOSPADM

## 2020-08-05 RX ADMIN — Medication 2 PUFF: at 20:24

## 2020-08-05 RX ADMIN — BUDESONIDE 500 MCG: 0.5 SUSPENSION RESPIRATORY (INHALATION) at 07:06

## 2020-08-05 RX ADMIN — POLYETHYLENE GLYCOL 3350 17 G: 17 POWDER, FOR SOLUTION ORAL at 09:31

## 2020-08-05 RX ADMIN — ENOXAPARIN SODIUM 30 MG: 30 INJECTION, SOLUTION INTRAVENOUS; SUBCUTANEOUS at 21:10

## 2020-08-05 RX ADMIN — METOPROLOL SUCCINATE 50 MG: 50 TABLET, EXTENDED RELEASE ORAL at 09:30

## 2020-08-05 RX ADMIN — HYDRALAZINE HYDROCHLORIDE 10 MG: 20 INJECTION INTRAMUSCULAR; INTRAVENOUS at 19:40

## 2020-08-05 RX ADMIN — Medication 2 PUFF: at 07:07

## 2020-08-05 RX ADMIN — FOLIC ACID 1 MG: 1 TABLET ORAL at 09:30

## 2020-08-05 RX ADMIN — POTASSIUM CHLORIDE 40 MEQ: 1500 TABLET, EXTENDED RELEASE ORAL at 20:29

## 2020-08-05 RX ADMIN — ATORVASTATIN CALCIUM 40 MG: 40 TABLET, FILM COATED ORAL at 09:30

## 2020-08-05 RX ADMIN — SODIUM CHLORIDE: 9 INJECTION, SOLUTION INTRAVENOUS at 05:29

## 2020-08-05 RX ADMIN — METHYLNALTREXONE BROMIDE 12 MG: 12 INJECTION, SOLUTION SUBCUTANEOUS at 13:21

## 2020-08-05 RX ADMIN — SODIUM CHLORIDE: 9 INJECTION, SOLUTION INTRAVENOUS at 16:17

## 2020-08-05 RX ADMIN — PANTOPRAZOLE SODIUM 40 MG: 40 TABLET, DELAYED RELEASE ORAL at 05:29

## 2020-08-05 RX ADMIN — BUDESONIDE 500 MCG: 0.5 SUSPENSION RESPIRATORY (INHALATION) at 20:24

## 2020-08-05 NOTE — PROGRESS NOTES
Pt resting quietly in bed, c/o nausea- PRN phenergan given, see MAR. A/O x4. VSS. Resps e/e on room air, lungs clear. Denies pain. Shift assessment complete, see flowsheet. Call light in reach, will monitor.

## 2020-08-05 NOTE — PROGRESS NOTES
Progress Note    Admit Date:  8/3/2020    Admitted with hypercalcemia and acute renal failure. Patient was taking a bottle of Tums every 3 days. He is responded very well to IV hydration. Calcium levels have improved. He is having good urine output    Subjective:  Mr. Brandon Ramos complains of severe constipation. He has not had a bowel movement in 4 days    Objective:   BP (!) 167/76   Pulse 74   Temp 97.6 °F (36.4 °C) (Oral)   Resp 16   Ht 5' 7\" (1.702 m)   Wt 207 lb 3.2 oz (94 kg)   SpO2 96%   BMI 32.45 kg/m²       Intake/Output Summary (Last 24 hours) at 8/5/2020 1143  Last data filed at 8/5/2020 0521  Gross per 24 hour   Intake 360 ml   Output 2050 ml   Net -1690 ml         Physical Exam:  General:  Awake, alert, NAD  Skin:  Warm and dry  Neck:  JVD absent. Neck supple  Chest:  Clear to auscultation, respiration easy. No wheezes, rales or rhonchi. Cardiovascular:  RRR ,S1S2 normal  Abdomen:  Soft, mildly tender, non distended, BS +  Extremities:  No edema. Intact peripheral pulses.  Brisk cap refill, < 2 secs  Neuro: non focal      Medications:   Scheduled Meds:   albuterol sulfate HFA  2 puff Inhalation Daily    insulin glargine  15 Units Subcutaneous Nightly    atorvastatin  40 mg Oral Daily    budesonide  500 mcg Nebulization BID    folic acid  1 mg Oral Daily    metoprolol succinate  50 mg Oral Daily    pantoprazole  40 mg Oral QAM AC    sodium chloride flush  10 mL Intravenous 2 times per day    insulin lispro  0-6 Units Subcutaneous TID WC    insulin lispro  0-3 Units Subcutaneous Nightly    enoxaparin  30 mg Subcutaneous Nightly       Continuous Infusions:   dextrose      sodium chloride 100 mL/hr at 08/05/20 0529       Data:  CBC:   Recent Labs     08/03/20  1405 08/04/20  0439   WBC 15.2* 8.2   RBC 5.19 4.29   HGB 12.1* 10.0*   HCT 39.2* 32.4*   MCV 75.5* 75.4*   RDW 19.7* 19.6*    260     BMP:   Recent Labs     08/04/20  0439 08/04/20  1059 08/05/20  0542    138 139 K 3.2* 3.3* 3.2*    100 103   CO2 30 28 28   BUN 34* 30* 25*   CREATININE 2.5* 2.4* 2.1*     BNP: No results for input(s): BNP in the last 72 hours. PT/INR: No results for input(s): PROTIME, INR in the last 72 hours. APTT: No results for input(s): APTT in the last 72 hours. CARDIAC ENZYMES:   Recent Labs     08/03/20  1405   TROPONINI <0.01     FASTING LIPID PANEL:  Lab Results   Component Value Date    CHOL 173 03/08/2019    HDL 32 (L) 03/08/2019    TRIG 177 (H) 03/08/2019     LIVER PROFILE:   Recent Labs     08/03/20  1405   AST 20   ALT 19   BILITOT <0.2   ALKPHOS 132*        Radiology  CT ABDOMEN PELVIS WO CONTRAST   Final Result   No CT evidence of acute intraabdominal pathology. Assessment:  Active Problems:    Hypercalcemia    MICHAEL (acute kidney injury) ([address]    PVD (peripheral vascular disease) ([address]  Resolved Problems:    * No resolved hospital problems. *      Plan:     Hypercalcemia  -Patient had severe hypercalcemia , calcium levels 18-->16--> 12.5 --> 11.0  with IVFs. - possibly related to taking 1 bottle of Tums every 3 days and dehydration.   -Continue aggressive IV hydration, monitoring telemetry, monitor calcium level closely     MICHAEL  - likely pre-renal  -Creatinine 2.6 on admission--> creatinine down to 2.1 today  - continue IVFs.    Hypokalemia  Hypomagnesemia  - replace electrolytes  - monitor labs.      Leukocytosis  - likely reactive  - resolved on repeat.      Cirrhosis  - Held Aldactone.      Hypertension  - BP stable. Continue Toprol-XL. prn hydralazine X1 dose today     Hyperlipidemia  - on Atorvastatin. COPD  - stable. No AE.  - continue Pulmicort, Albuterol prn.     DM type 2  - monitor glucose. Continue Lantus, SSI coverage.      Gout  - stable. No Acute flare.      PVD  - to have Aortofemoral bypass graft with Dr. Jett Gomez.   - on statin.      GERD  - on PPI.      Constipation  - We will give 1 dose of Relistor, as  patient takes chronic pain medications  -Continue MiraLAX as needed  -Tapwater enema today     DVT Prophylaxis: Lovenox  Diet: DIET CARB CONTROL;  Code Status: Full Code        Kimberly Briseno MD 8/5/2020 11:43 AM

## 2020-08-05 NOTE — PROGRESS NOTES
Patient assessed. Diminished to auscultation in bilateral lobes. Patient very concerned and uncomfortable about not having a bowel movement still. PRN miralax given, per request, in apple juice. Scheduled medications given at this time. Call light and bedside table with-in easy reach. Roz Cadena

## 2020-08-06 VITALS
HEIGHT: 67 IN | BODY MASS INDEX: 32.36 KG/M2 | RESPIRATION RATE: 18 BRPM | SYSTOLIC BLOOD PRESSURE: 167 MMHG | WEIGHT: 206.2 LBS | DIASTOLIC BLOOD PRESSURE: 82 MMHG | HEART RATE: 92 BPM | OXYGEN SATURATION: 95 % | TEMPERATURE: 97.6 F

## 2020-08-06 LAB
ANION GAP SERPL CALCULATED.3IONS-SCNC: 11 MMOL/L (ref 3–16)
BUN BLDV-MCNC: 17 MG/DL (ref 7–20)
CALCIUM SERPL-MCNC: 10.2 MG/DL (ref 8.3–10.6)
CHLORIDE BLD-SCNC: 106 MMOL/L (ref 99–110)
CO2: 24 MMOL/L (ref 21–32)
CREAT SERPL-MCNC: 1.7 MG/DL (ref 0.8–1.3)
GFR AFRICAN AMERICAN: 49
GFR NON-AFRICAN AMERICAN: 41
GLUCOSE BLD-MCNC: 110 MG/DL (ref 70–99)
GLUCOSE BLD-MCNC: 87 MG/DL (ref 70–99)
GLUCOSE BLD-MCNC: 91 MG/DL (ref 70–99)
GLUCOSE BLD-MCNC: 94 MG/DL (ref 70–99)
MAGNESIUM: 1.7 MG/DL (ref 1.8–2.4)
PERFORMED ON: ABNORMAL
PERFORMED ON: NORMAL
PERFORMED ON: NORMAL
POTASSIUM REFLEX MAGNESIUM: 3.3 MMOL/L (ref 3.5–5.1)
SODIUM BLD-SCNC: 141 MMOL/L (ref 136–145)

## 2020-08-06 PROCEDURE — 6370000000 HC RX 637 (ALT 250 FOR IP): Performed by: PHYSICIAN ASSISTANT

## 2020-08-06 PROCEDURE — 2580000003 HC RX 258: Performed by: PHYSICIAN ASSISTANT

## 2020-08-06 PROCEDURE — 83735 ASSAY OF MAGNESIUM: CPT

## 2020-08-06 PROCEDURE — 94761 N-INVAS EAR/PLS OXIMETRY MLT: CPT

## 2020-08-06 PROCEDURE — 94640 AIRWAY INHALATION TREATMENT: CPT

## 2020-08-06 PROCEDURE — 6370000000 HC RX 637 (ALT 250 FOR IP): Performed by: INTERNAL MEDICINE

## 2020-08-06 PROCEDURE — 6360000002 HC RX W HCPCS: Performed by: PHYSICIAN ASSISTANT

## 2020-08-06 PROCEDURE — 80048 BASIC METABOLIC PNL TOTAL CA: CPT

## 2020-08-06 PROCEDURE — 99238 HOSP IP/OBS DSCHRG MGMT 30/<: CPT | Performed by: INTERNAL MEDICINE

## 2020-08-06 PROCEDURE — 2700000000 HC OXYGEN THERAPY PER DAY

## 2020-08-06 PROCEDURE — 36415 COLL VENOUS BLD VENIPUNCTURE: CPT

## 2020-08-06 RX ORDER — NAPROXEN 500 MG/1
500 TABLET ORAL 2 TIMES DAILY PRN
Status: ON HOLD | COMMUNITY
End: 2020-08-06

## 2020-08-06 RX ORDER — HYDRALAZINE HYDROCHLORIDE 25 MG/1
25 TABLET, FILM COATED ORAL EVERY 8 HOURS SCHEDULED
Status: DISCONTINUED | OUTPATIENT
Start: 2020-08-06 | End: 2020-08-06 | Stop reason: HOSPADM

## 2020-08-06 RX ORDER — SPIRONOLACTONE 50 MG/1
50 TABLET, FILM COATED ORAL 2 TIMES DAILY
Status: ON HOLD | COMMUNITY
End: 2020-08-06

## 2020-08-06 RX ORDER — POTASSIUM CHLORIDE 20 MEQ/1
20 TABLET, EXTENDED RELEASE ORAL
Status: DISCONTINUED | OUTPATIENT
Start: 2020-08-06 | End: 2020-08-06 | Stop reason: HOSPADM

## 2020-08-06 RX ADMIN — PANTOPRAZOLE SODIUM 40 MG: 40 TABLET, DELAYED RELEASE ORAL at 05:39

## 2020-08-06 RX ADMIN — BUDESONIDE 500 MCG: 0.5 SUSPENSION RESPIRATORY (INHALATION) at 07:03

## 2020-08-06 RX ADMIN — POTASSIUM CHLORIDE 40 MEQ: 1500 TABLET, EXTENDED RELEASE ORAL at 06:38

## 2020-08-06 RX ADMIN — ATORVASTATIN CALCIUM 40 MG: 40 TABLET, FILM COATED ORAL at 08:46

## 2020-08-06 RX ADMIN — HYDRALAZINE HYDROCHLORIDE 25 MG: 25 TABLET, FILM COATED ORAL at 11:29

## 2020-08-06 RX ADMIN — FOLIC ACID 1 MG: 1 TABLET ORAL at 08:46

## 2020-08-06 RX ADMIN — SODIUM CHLORIDE: 9 INJECTION, SOLUTION INTRAVENOUS at 02:39

## 2020-08-06 RX ADMIN — Medication 2 PUFF: at 07:03

## 2020-08-06 RX ADMIN — METOPROLOL SUCCINATE 50 MG: 50 TABLET, EXTENDED RELEASE ORAL at 08:46

## 2020-08-06 RX ADMIN — POTASSIUM CHLORIDE 20 MEQ: 1500 TABLET, EXTENDED RELEASE ORAL at 11:29

## 2020-08-06 NOTE — PROGRESS NOTES
Physician Progress Note      Izabella Martinez  CSN #:                  [id number]  :                       1955  ADMIT DATE:       8/3/2020 1:54 PM  [address] [place] DATE:  RESPONDING  PROVIDER #:        Mary Krueger MD          QUERY TEXT:    Patient admitted with hypercalcemia, possibly related to taking 1 bottle of   Tums every 3 days and dehydration per h/p. If possible, please document in   progress notes and discharge summary if you are evaluating and/or treating any   of the following: The medical record reflects the following:  Risk Factors: tums-1 bottle every 3 days, dehydration, liver cirrhosis  Clinical Indicators: hypercalcemia, calcium levels 18-->16--> 12.5  Treatment:  aggressive IV hydration, monitoring telemetry, monitor calcium   level closely    Thank you for your assistance,  Jane Whelan RN,BSN,CCDS,CRCR  Options provided:  -- Accidental overdose of Tums  -- Adverse effect of tums, properly administered  -- Other - I will add my own diagnosis  -- Disagree - Not applicable / Not valid  -- Disagree - Clinically unable to determine / Unknown  -- Refer to Clinical Documentation Reviewer    PROVIDER RESPONSE TEXT:    This patient had an accidental overdose of tums.     Query created by: Floyd Butler on 2020 9:55 AM      Electronically signed by:  Mary Krueger MD 2020 11:11 AM

## 2020-08-06 NOTE — PROGRESS NOTES
Pt assessed. Diminished to ausculation in bilateral lobes. Scheduled medications given at this time. Pt denies any needs. States he feels \"cleaned out\" (talking about his bowel movements overnight). Call light and bedside table with-in easy reach. Roz Traylor

## 2020-08-06 NOTE — PROGRESS NOTES
Shift assessment complete. Scheduled medications given at this time. Call light within reach. Pt states no needs at this time. Will continue to monitor.

## 2020-08-06 NOTE — CARE COORDINATION
DISCHARGE ORDER  Date/Time 2020 2:10 PM  Completed by: Vanessa Saavedra, Case Management    Patient Name: Kylah Parker    : 1955  Admitting Diagnosis: Hypercalcemia [E83.52]      Admit order Date and Status:8/3/2020 1354 inpt  (verify MD's last order for status of admission)      Noted discharge order. Confirmed discharge plan  (pt): Yes  with whom___________pt____  If pt confirmed DC plan does family need to be contacted by CM No if yes who__n/a____  Discharge Plan: Reviewed chart. Role of discharge planner explained and patient verbalized understanding. Discharge order is noted. Pt is being d/c'd to home today. Pt's O2 sats are 95% on RA. Pt is active with [facility] for O2 qhs prn. Pt declines HHC. No further discharge needs needed or noted. Reviewed chart. Role of discharge planner explained and patient verbalized understanding. Discharge order is noted. Has Home O2 in place on admit:  Yes qhs prn only  Informed of need to bring portable home O2 tank on day of discharge for nursing to connect prior to leaving:   Yes qhs prn only    Verbalized agreement/Understanding:   Yes     Discharge timeout done with Nany Hurtado RN. All discharge needs and concerns addressed.
INTERDISCIPLINARY PLAN OF CARE CONFERENCE    Date/Time: 8/5/2020 2:47 PM  Completed by: Fiordaliza Hawkins Case Management      Patient Name:  Harini Gage  YOB: 1955  Admitting Diagnosis: Hypercalcemia [E83.52]     Admit Date/Time:  8/3/2020  1:54 PM    Chart reviewed. Interdisciplinary team met to discuss patient progress and discharge plans. Disciplines included Case Management, Nursing, and Dietitian. Current Status:inpt    PT/OT recommendation:tbd    Anticipated Discharge Date: tbd  Expected D/C Disposition:  Home  Confirmed plan with patient and/or family Yes  Discharge Plan Comments: Reviewed chart. Plan home with SO. Pt active with [facility] for home O2, uses hs only. Follow. The Plan for Transition of Care is related to the following treatment goals: home    The Patient and/or patient representative  was provided with a choice of provider and agrees   with the discharge plan.  [x] Yes [] No      Home O2 in place on admit: Yes, uses hs only  Pt informed of need to bring portable home O2 tank on day of discharge for nursing to connect prior to leaving:  Not Indicated  Verbalized agreement/Understanding:  Not Indicated
choice list was provided with basic dialogue that supports the patient's individualized plan of care/goals, treatment preferences and shares the quality data associated with the providers. [] Yes [x] No    DISCHARGE PLAN: Chart reviewed, met with pt at bedside. Pt is from mobile home with SO and plans to return. Pt ambulates free of device. Relies on brother and friends for transportation. Pt is active with [facility] for nocturnal O2, currently on RA. Will follow. Explained Case Management role/services.

## 2020-08-07 ENCOUNTER — CARE COORDINATION (OUTPATIENT)
Dept: CARE COORDINATION | Age: 65
End: 2020-08-07

## 2020-08-07 ENCOUNTER — TELEPHONE (OUTPATIENT)
Dept: FAMILY MEDICINE CLINIC | Age: 65
End: 2020-08-07

## 2020-08-07 NOTE — TELEPHONE ENCOUNTER
We should ask for home care consult to help monitor his blood pressure before automatically restarting medication.   He may have been on blood pressure medication that could have raised his calcium as well

## 2020-08-07 NOTE — TELEPHONE ENCOUNTER
Pt states that when he was in the hospital they took him off of his blood pressure meds. Pt states that he is worried about not having any to take. He uses [address]

## 2020-08-07 NOTE — CARE COORDINATION
Outreach attempt regarding pt recent visit to the ED, which turned into an inpatient admission. Pt was admitted on 8. 3.20 and discharged on 8.6.20. Pt was unable to reach; writer left VM message with writer's contact information and also patient's primary nurse contact information.

## 2020-08-10 ENCOUNTER — TELEPHONE (OUTPATIENT)
Dept: VASCULAR SURGERY | Age: 65
End: 2020-08-10

## 2020-08-10 ENCOUNTER — NURSE ONLY (OUTPATIENT)
Dept: FAMILY MEDICINE CLINIC | Age: 65
End: 2020-08-10
Payer: COMMERCIAL

## 2020-08-10 ENCOUNTER — TELEPHONE (OUTPATIENT)
Dept: FAMILY MEDICINE CLINIC | Age: 65
End: 2020-08-10

## 2020-08-10 PROCEDURE — 36415 COLL VENOUS BLD VENIPUNCTURE: CPT | Performed by: FAMILY MEDICINE

## 2020-08-10 NOTE — TELEPHONE ENCOUNTER
Patient had his labs drawn, he was still concerned about metformin and spironolactone being stopped when he was in the hospital for hypercalcemia. He is still on diltiazem  mg and metoprolol for his blood pressure. He has an appointment in September.

## 2020-08-10 NOTE — DISCHARGE SUMMARY
Name:  Derrick Herman  Room:  /8201-00  MRN:    [id number]    Discharge Summary      This discharge summary is in conjunction with a complete physical exam done on the day of discharge. Discharging Physician: Dr. Jorge Erickson: 8/3/2020  Discharge:  8/6/2020    HPI taken from admission H&P:      The patient is a 62 y.o. male with OA, paresthesia BLE's, hypertension, hyperlipidemia, Gout, DM, COPD, and cirrhosis who presents to [facility] with c/o nausea. He was sent to ED by PCP for hypercalcemia. He was having a pre-op physical for aortobifemoral bypass surgery. They found hypercalcemia on his labs. He had a stress test on 7/29. After this, he started having nausea, vomiting, and lower abdominal pain. He usually has RUQ abdominal pain, but this went across his lower abdomen. He takes 1 bottle of Tums about every 3 days. HR bradycardic. He is on 2 L O2. Labs with MICHAEL, hypercalcemia, Hypokalemia, Hypomagnesemia, and leukocytosis. CT abdomen/pelvis negative. Patient admitted to PCU on telemetry.       Patient is currently being hydrated and calcium levels are improving.     Diagnoses this Admission and Hospital Course     Hypercalcemia  -Patient had severe hypercalcemia, 2/2 accidental overdosing of meds    - hydrated aggressively. -  calcium levels 18-->16--> 12.5 --> down to 10.2 at time of discharge   - possibly related to taking 1 bottle of Tums every 3 days and dehydration.   -treated with aggressive IV hydration, monitored on telemetry. - pt stable today . DC home with close OP f/u with PCP and discontinuation of TUMS     MICHAEL  - likely pre-renal  -Creatinine 2.6 on admission-> 1.7 after IVF. His crt was 0.7 in 5/2020. Will need close OP f/u  , rpt BMP in 1 week    Hypokalemia  Hypomagnesemia  - replaced     Leukocytosis  - likely reactive  - resolved on repeat.      Cirrhosis  - Held Aldactone- do not resume on dc .      Hypertension  - BP stable. Continue Toprol-XL.   Resume cardizem      Hyperlipidemia  - on Atorvastatin. COPD  - stable. No AE.  - continue Pulmicort, Albuterol prn.     DM type 2  - resume home regimen on dc     Gout  - stable. No Acute flare.      PVD  - to have Aortofemoral bypass graft with Dr. Wilmer Scherer.   - on statin.      GERD  - on PPI. Constipation  - given 1 dose of Relistor, as  patient takes chronic pain medications  -Continued MiraLAX as needed  -Tapwater enema  Given on 8/5  Good BM yesterday and today       Procedures (Please Review Full Report for Details)  None     Consults    None       Physical Exam at Discharge:    BP (!) 167/82   Pulse 92   Temp 97.6 °F (36.4 °C) (Axillary)   Resp 18   Ht 5' 7\" (1.702 m)   Wt 206 lb 3.2 oz (93.5 kg)   SpO2 95%   BMI 32.30 kg/m²   Gen: No distress. Alert. Eyes: PERRL. No sclera icterus. No conjunctival injection. ENT: No discharge. Pharynx clear. Neck: No JVD. No Carotid Bruit. Trachea midline. Resp: No accessory muscle use. No crackles. No wheezes. No rhonchi. CV: Regular rate. Regular rhythm. No murmur. No rub. No edema. GI: Non-tender. Non-distended. No masses. No organomegaly. Normal bowel sounds. No hernia. Skin: Warm and dry. No nodule on exposed extremities. No rash on exposed extremities. M/S: No cyanosis. No joint deformity. No clubbing. Neuro: Awake. Grossly nonfocal    Psych: Oriented x 3. No anxiety or agitation.        CULTURES  None       Data:  CBC:        Recent Labs     08/03/20  1405 08/04/20  0439   WBC 15.2* 8.2   RBC 5.19 4.29   HGB 12.1* 10.0*   HCT 39.2* 32.4*   MCV 75.5* 75.4*   RDW 19.7* 19.6*    260     BMP:         Recent Labs     08/04/20  1059 08/05/20  0542 08/06/20  0502    139 141   K 3.3* 3.2* 3.3*    103 106   CO2 28 28 24   BUN 30* 25* 17   CREATININE 2.4* 2.1* 1.7*       CARDIAC ENZYMES:       Recent Labs     08/03/20  1405   TROPONINI <0.01     FASTING LIPID PANEL:        Lab Results   Component Value Date     CHOL 173 03/08/2019     strip  Generic drug:  blood glucose test strips  USE 1 STRIP IN VITRO ROUTE 2 TIMES DAILY     gabapentin 300 MG capsule  Commonly known as:  NEURONTIN     Incruse Ellipta 62.5 MCG/INH Aepb  Generic drug:  Umeclidinium Bromide  INHALE 1 PUFF DAILY     LIDOCAINE (LIDODERM) 5% PATCH AND REMOVAL     metoprolol succinate 50 MG extended release tablet  Commonly known as:  TOPROL XL  Take 1 tablet by mouth daily     Narcan 4 MG/0.1ML Liqd nasal spray  Generic drug:  naloxone     ondansetron 8 MG tablet  Commonly known as:  ZOFRAN  TAKE ONE TABLET BY MOUTH EVERY 8 HOURS AS NEEDED FOR NAUSEA OR VOMITING     oxyCODONE 5 MG immediate release tablet  Commonly known as:  ROXICODONE  Notes to patient:  Not given in the last 6 hours     OXYGEN     pantoprazole 40 MG tablet  Commonly known as:  PROTONIX  Take 1 tablet by mouth every morning (before breakfast)     Unifine Pentips 31G X 8 MM Misc  Generic drug:  Insulin Pen Needle  USE ONCE DAILY WITH INJECTION     vitamin B-1 100 MG tablet  Commonly known as:  THIAMINE  TAKE 1 TABLET BY MOUTH DAILY        STOP taking these medications    HYDROmorphone 2 MG tablet  Commonly known as:  DILAUDID     metFORMIN 500 MG tablet  Commonly known as:  GLUCOPHAGE     naproxen 500 MG tablet  Commonly known as:  NAPROSYN     spironolactone 50 MG tablet  Commonly known as:  ALDACTONE              Discharged in stable condition to Home     Follow Up:   Follow up with PCP in 1 week        Kimberly Briseno MD  8/6/2020

## 2020-08-11 LAB
ANION GAP SERPL CALCULATED.3IONS-SCNC: 17 MMOL/L (ref 3–16)
BUN BLDV-MCNC: 12 MG/DL (ref 7–20)
CALCIUM SERPL-MCNC: 8.6 MG/DL (ref 8.3–10.6)
CHLORIDE BLD-SCNC: 102 MMOL/L (ref 99–110)
CO2: 22 MMOL/L (ref 21–32)
CREAT SERPL-MCNC: 1.4 MG/DL (ref 0.8–1.3)
GFR AFRICAN AMERICAN: >60
GFR NON-AFRICAN AMERICAN: 51
GLUCOSE BLD-MCNC: 99 MG/DL (ref 70–99)
POTASSIUM SERPL-SCNC: 3.4 MMOL/L (ref 3.5–5.1)
SODIUM BLD-SCNC: 141 MMOL/L (ref 136–145)

## 2020-08-11 NOTE — TELEPHONE ENCOUNTER
Glucose was 99 yesterday. I would ask him to do fingersticks at home daily. Stay off the metformin and spironolactone until we see him. If he begins to develop shortness of breath he should call.

## 2020-08-12 ENCOUNTER — TELEPHONE (OUTPATIENT)
Dept: VASCULAR SURGERY | Age: 65
End: 2020-08-12

## 2020-08-12 ENCOUNTER — PREP FOR PROCEDURE (OUTPATIENT)
Dept: VASCULAR SURGERY | Age: 65
End: 2020-08-12

## 2020-08-12 RX ORDER — SODIUM CHLORIDE 0.9 % (FLUSH) 0.9 %
10 SYRINGE (ML) INJECTION EVERY 12 HOURS SCHEDULED
Status: CANCELLED | OUTPATIENT
Start: 2020-08-12

## 2020-08-12 RX ORDER — SODIUM CHLORIDE 0.9 % (FLUSH) 0.9 %
10 SYRINGE (ML) INJECTION PRN
Status: CANCELLED | OUTPATIENT
Start: 2020-08-12

## 2020-08-12 NOTE — TELEPHONE ENCOUNTER
Called patient regarding date and time of aortobifemoral surgery and covid test. Scheduled for 8/24/20 at 7:00am/arrive at 5:30am. Npo after midnight. Covid test for 8/19/20. Abilio

## 2020-08-19 ENCOUNTER — TELEPHONE (OUTPATIENT)
Dept: VASCULAR SURGERY | Age: 65
End: 2020-08-19

## 2020-08-19 ENCOUNTER — OFFICE VISIT (OUTPATIENT)
Dept: PRIMARY CARE CLINIC | Age: 65
End: 2020-08-19
Payer: COMMERCIAL

## 2020-08-19 PROCEDURE — G8417 CALC BMI ABV UP PARAM F/U: HCPCS | Performed by: NURSE PRACTITIONER

## 2020-08-19 PROCEDURE — 99211 OFF/OP EST MAY X REQ PHY/QHP: CPT | Performed by: NURSE PRACTITIONER

## 2020-08-19 PROCEDURE — G8428 CUR MEDS NOT DOCUMENT: HCPCS | Performed by: NURSE PRACTITIONER

## 2020-08-19 NOTE — PROGRESS NOTES
Ashley Mayfield received a viral test for COVID-19. They were educated on isolation and quarantine as appropriate. For any symptoms, they were directed to seek care from their PCP, given contact information to establish with a doctor, directed to an urgent care or the emergency room.

## 2020-08-19 NOTE — PROGRESS NOTES
Preoperative Screening for Elective Surgery/Invasive Procedures While COVID-19 present in the community     Have you had any of the following symptoms? NONE  o Fever, chills  o Cough  o Shortness of breath  o Muscle aches/pain  o Diarrhea  o Abdominal pain, nausea, vomiting  o Loss or decrease in taste and / or smell   Risk of Exposure  o Have you recently been hospitalized for COVID-19 or flu-like illness, if so when? NO  o Recently diagnosed with COVID-19, if so when? NO  o Recently tested for COVID-19, if so when? YES 8/19 PREOP SCREENING  o Have you been in close contact with a person or family member who currently has or recently had COVID-23? If yes, when and in what context? NO  o Do you live with anybody who in the last 14 days has had fever, chills, shortness of breath, muscle aches, flu-like illness? NO  o Do you have any close contacts or family members who are currently in the hospital for COVID-19 or flu-like illness? If yes, assess recent close contact with this person. NO    Indicate if the patient has a positive screen by answering yes to one or more of the above questions.   Patients who test positive or screen positive prior to surgery or on the day of surgery should be evaluated in conjunction with the surgeon/proceduralist/anesthesiologist to determine the urgency of the procedure.     '

## 2020-08-19 NOTE — TELEPHONE ENCOUNTER
Called patient to remind him of his surgery for Monday August 24 at [place]. Arrive at 5:30am/7:00am start time. Npo after midnight. Getting COVID test today 8/19/20.  valentine

## 2020-08-19 NOTE — PATIENT INSTRUCTIONS

## 2020-08-20 LAB — SARS-COV-2, NAA: NOT DETECTED

## 2020-08-24 ENCOUNTER — APPOINTMENT (OUTPATIENT)
Dept: GENERAL RADIOLOGY | Age: 65
DRG: 169 | End: 2020-08-24
Attending: SURGERY
Payer: COMMERCIAL

## 2020-08-24 ENCOUNTER — HOSPITAL ENCOUNTER (INPATIENT)
Age: 65
LOS: 7 days | Discharge: HOME HEALTH | DRG: 169 | End: 2020-08-31
Attending: SURGERY | Admitting: SURGERY
Payer: COMMERCIAL

## 2020-08-24 ENCOUNTER — ANESTHESIA (OUTPATIENT)
Dept: OPERATING ROOM | Age: 65
DRG: 169 | End: 2020-08-24
Payer: COMMERCIAL

## 2020-08-24 ENCOUNTER — ANESTHESIA EVENT (OUTPATIENT)
Dept: OPERATING ROOM | Age: 65
DRG: 169 | End: 2020-08-24
Payer: COMMERCIAL

## 2020-08-24 VITALS
DIASTOLIC BLOOD PRESSURE: 68 MMHG | RESPIRATION RATE: 4 BRPM | TEMPERATURE: 97.7 F | SYSTOLIC BLOOD PRESSURE: 114 MMHG | OXYGEN SATURATION: 99 %

## 2020-08-24 PROBLEM — I73.9 PERIPHERAL VASCULAR DISEASE, UNSPECIFIED (HCC): Status: ACTIVE | Noted: 2020-08-24

## 2020-08-24 LAB
ABO/RH: NORMAL
ANION GAP SERPL CALCULATED.3IONS-SCNC: 10 MMOL/L (ref 3–16)
ANION GAP SERPL CALCULATED.3IONS-SCNC: 13 MMOL/L (ref 3–16)
ANION GAP SERPL CALCULATED.3IONS-SCNC: 7 MMOL/L (ref 3–16)
ANTIBODY SCREEN: NORMAL
BASE EXCESS ARTERIAL: -2 MMOL/L (ref -3–3)
BUN BLDV-MCNC: 7 MG/DL (ref 7–20)
BUN BLDV-MCNC: 8 MG/DL (ref 7–20)
BUN BLDV-MCNC: 8 MG/DL (ref 7–20)
CALCIUM SERPL-MCNC: 8.9 MG/DL (ref 8.3–10.6)
CALCIUM SERPL-MCNC: 9.3 MG/DL (ref 8.3–10.6)
CALCIUM SERPL-MCNC: 9.6 MG/DL (ref 8.3–10.6)
CARBOXYHEMOGLOBIN ARTERIAL: 1.1 % (ref 0–1.5)
CHLORIDE BLD-SCNC: 100 MMOL/L (ref 99–110)
CHLORIDE BLD-SCNC: 102 MMOL/L (ref 99–110)
CHLORIDE BLD-SCNC: 105 MMOL/L (ref 99–110)
CO2: 26 MMOL/L (ref 21–32)
CO2: 28 MMOL/L (ref 21–32)
CO2: 28 MMOL/L (ref 21–32)
CREAT SERPL-MCNC: 0.8 MG/DL (ref 0.8–1.3)
CREAT SERPL-MCNC: 0.9 MG/DL (ref 0.8–1.3)
CREAT SERPL-MCNC: 1.1 MG/DL (ref 0.8–1.3)
GFR AFRICAN AMERICAN: >60
GFR NON-AFRICAN AMERICAN: >60
GLUCOSE BLD-MCNC: 110 MG/DL (ref 70–99)
GLUCOSE BLD-MCNC: 111 MG/DL (ref 70–99)
GLUCOSE BLD-MCNC: 148 MG/DL (ref 70–99)
GLUCOSE BLD-MCNC: 168 MG/DL (ref 70–99)
GLUCOSE BLD-MCNC: 183 MG/DL (ref 70–99)
GLUCOSE BLD-MCNC: 189 MG/DL (ref 70–99)
HCO3 ARTERIAL: 23.6 MMOL/L (ref 21–29)
HCT VFR BLD CALC: 28.4 % (ref 40.5–52.5)
HCT VFR BLD CALC: 32.5 % (ref 40.5–52.5)
HEMOGLOBIN, ART, EXTENDED: 9.6 G/DL (ref 13.5–17.5)
HEMOGLOBIN: 10.1 G/DL (ref 13.5–17.5)
HEMOGLOBIN: 8.9 G/DL (ref 13.5–17.5)
MCH RBC QN AUTO: 23.3 PG (ref 26–34)
MCH RBC QN AUTO: 23.6 PG (ref 26–34)
MCHC RBC AUTO-ENTMCNC: 31.3 G/DL (ref 31–36)
MCHC RBC AUTO-ENTMCNC: 31.4 G/DL (ref 31–36)
MCV RBC AUTO: 74.5 FL (ref 80–100)
MCV RBC AUTO: 75.1 FL (ref 80–100)
METHEMOGLOBIN ARTERIAL: 0.7 %
O2 CONTENT ARTERIAL: 13 ML/DL
O2 SAT, ARTERIAL: 94.4 %
O2 THERAPY: ABNORMAL
PCO2 ARTERIAL: 43.8 MMHG (ref 35–45)
PDW BLD-RTO: 21.2 % (ref 12.4–15.4)
PDW BLD-RTO: 21.8 % (ref 12.4–15.4)
PERFORMED ON: ABNORMAL
PH ARTERIAL: 7.35 (ref 7.35–7.45)
PLATELET # BLD: 319 K/UL (ref 135–450)
PLATELET # BLD: 383 K/UL (ref 135–450)
PMV BLD AUTO: 6.7 FL (ref 5–10.5)
PMV BLD AUTO: 6.7 FL (ref 5–10.5)
PO2 ARTERIAL: 83.5 MMHG (ref 75–108)
POTASSIUM REFLEX MAGNESIUM: 3.6 MMOL/L (ref 3.5–5.1)
POTASSIUM REFLEX MAGNESIUM: 4.2 MMOL/L (ref 3.5–5.1)
POTASSIUM REFLEX MAGNESIUM: 5 MMOL/L (ref 3.5–5.1)
RBC # BLD: 3.78 M/UL (ref 4.2–5.9)
RBC # BLD: 4.36 M/UL (ref 4.2–5.9)
SODIUM BLD-SCNC: 137 MMOL/L (ref 136–145)
SODIUM BLD-SCNC: 141 MMOL/L (ref 136–145)
SODIUM BLD-SCNC: 141 MMOL/L (ref 136–145)
TCO2 ARTERIAL: 24.9 MMOL/L
WBC # BLD: 10.3 K/UL (ref 4–11)
WBC # BLD: 13.3 K/UL (ref 4–11)

## 2020-08-24 PROCEDURE — 64488 TAP BLOCK BI INJECTION: CPT | Performed by: ANESTHESIOLOGY

## 2020-08-24 PROCEDURE — 04100JK BYPASS ABDOMINAL AORTA TO BILATERAL FEMORAL ARTERIES WITH SYNTHETIC SUBSTITUTE, OPEN APPROACH: ICD-10-PCS | Performed by: SURGERY

## 2020-08-24 PROCEDURE — P9045 ALBUMIN (HUMAN), 5%, 250 ML: HCPCS | Performed by: NURSE ANESTHETIST, CERTIFIED REGISTERED

## 2020-08-24 PROCEDURE — 2500000003 HC RX 250 WO HCPCS: Performed by: SURGERY

## 2020-08-24 PROCEDURE — 86901 BLOOD TYPING SEROLOGIC RH(D): CPT

## 2020-08-24 PROCEDURE — 6360000002 HC RX W HCPCS: Performed by: SURGERY

## 2020-08-24 PROCEDURE — 36620 INSERTION CATHETER ARTERY: CPT | Performed by: NURSE ANESTHETIST, CERTIFIED REGISTERED

## 2020-08-24 PROCEDURE — 2580000003 HC RX 258: Performed by: ANESTHESIOLOGY

## 2020-08-24 PROCEDURE — 3700000000 HC ANESTHESIA ATTENDED CARE: Performed by: SURGERY

## 2020-08-24 PROCEDURE — 2000000000 HC ICU R&B

## 2020-08-24 PROCEDURE — 86850 RBC ANTIBODY SCREEN: CPT

## 2020-08-24 PROCEDURE — 94761 N-INVAS EAR/PLS OXIMETRY MLT: CPT

## 2020-08-24 PROCEDURE — 2580000003 HC RX 258: Performed by: NURSE ANESTHETIST, CERTIFIED REGISTERED

## 2020-08-24 PROCEDURE — 85027 COMPLETE CBC AUTOMATED: CPT

## 2020-08-24 PROCEDURE — 2709999900 HC NON-CHARGEABLE SUPPLY: Performed by: SURGERY

## 2020-08-24 PROCEDURE — 2700000000 HC OXYGEN THERAPY PER DAY

## 2020-08-24 PROCEDURE — 6360000002 HC RX W HCPCS: Performed by: ANESTHESIOLOGY

## 2020-08-24 PROCEDURE — 3600000017 HC SURGERY HYBRID ADDL 15MIN: Performed by: SURGERY

## 2020-08-24 PROCEDURE — 2500000003 HC RX 250 WO HCPCS: Performed by: ANESTHESIOLOGY

## 2020-08-24 PROCEDURE — 3600000007 HC SURGERY HYBRID BASE: Performed by: SURGERY

## 2020-08-24 PROCEDURE — 2580000003 HC RX 258: Performed by: SURGERY

## 2020-08-24 PROCEDURE — C1768 GRAFT, VASCULAR: HCPCS | Performed by: SURGERY

## 2020-08-24 PROCEDURE — 80048 BASIC METABOLIC PNL TOTAL CA: CPT

## 2020-08-24 PROCEDURE — 71045 X-RAY EXAM CHEST 1 VIEW: CPT

## 2020-08-24 PROCEDURE — 35646 BPG AORTOBIFEMORAL: CPT | Performed by: SURGERY

## 2020-08-24 PROCEDURE — 86900 BLOOD TYPING SEROLOGIC ABO: CPT

## 2020-08-24 PROCEDURE — 3700000001 HC ADD 15 MINUTES (ANESTHESIA): Performed by: SURGERY

## 2020-08-24 PROCEDURE — 2500000003 HC RX 250 WO HCPCS: Performed by: NURSE ANESTHETIST, CERTIFIED REGISTERED

## 2020-08-24 PROCEDURE — C9290 INJ, BUPIVACAINE LIPOSOME: HCPCS | Performed by: ANESTHESIOLOGY

## 2020-08-24 PROCEDURE — C9113 INJ PANTOPRAZOLE SODIUM, VIA: HCPCS | Performed by: SURGERY

## 2020-08-24 PROCEDURE — 82803 BLOOD GASES ANY COMBINATION: CPT

## 2020-08-24 PROCEDURE — 6360000002 HC RX W HCPCS: Performed by: NURSE ANESTHETIST, CERTIFIED REGISTERED

## 2020-08-24 PROCEDURE — 6370000000 HC RX 637 (ALT 250 FOR IP): Performed by: SURGERY

## 2020-08-24 DEVICE — GRAFT VASC SYNTH KNT DBL 14X7MMX50CM: Type: IMPLANTABLE DEVICE | Site: ABDOMEN | Status: FUNCTIONAL

## 2020-08-24 RX ORDER — ONDANSETRON 2 MG/ML
4 INJECTION INTRAMUSCULAR; INTRAVENOUS EVERY 6 HOURS PRN
Status: DISCONTINUED | OUTPATIENT
Start: 2020-08-24 | End: 2020-08-31 | Stop reason: HOSPADM

## 2020-08-24 RX ORDER — MEPERIDINE HYDROCHLORIDE 50 MG/ML
12.5 INJECTION INTRAMUSCULAR; INTRAVENOUS; SUBCUTANEOUS EVERY 5 MIN PRN
Status: DISCONTINUED | OUTPATIENT
Start: 2020-08-24 | End: 2020-08-24 | Stop reason: HOSPADM

## 2020-08-24 RX ORDER — PROPOFOL 10 MG/ML
INJECTION, EMULSION INTRAVENOUS PRN
Status: DISCONTINUED | OUTPATIENT
Start: 2020-08-24 | End: 2020-08-24 | Stop reason: SDUPTHER

## 2020-08-24 RX ORDER — OXYCODONE HYDROCHLORIDE AND ACETAMINOPHEN 5; 325 MG/1; MG/1
1 TABLET ORAL PRN
Status: DISCONTINUED | OUTPATIENT
Start: 2020-08-24 | End: 2020-08-24 | Stop reason: HOSPADM

## 2020-08-24 RX ORDER — MORPHINE SULFATE 2 MG/ML
2 INJECTION, SOLUTION INTRAMUSCULAR; INTRAVENOUS EVERY 5 MIN PRN
Status: DISCONTINUED | OUTPATIENT
Start: 2020-08-24 | End: 2020-08-24 | Stop reason: HOSPADM

## 2020-08-24 RX ORDER — DEXAMETHASONE SODIUM PHOSPHATE 4 MG/ML
INJECTION, SOLUTION INTRA-ARTICULAR; INTRALESIONAL; INTRAMUSCULAR; INTRAVENOUS; SOFT TISSUE PRN
Status: DISCONTINUED | OUTPATIENT
Start: 2020-08-24 | End: 2020-08-24 | Stop reason: SDUPTHER

## 2020-08-24 RX ORDER — FENTANYL CITRATE 50 UG/ML
INJECTION, SOLUTION INTRAMUSCULAR; INTRAVENOUS PRN
Status: DISCONTINUED | OUTPATIENT
Start: 2020-08-24 | End: 2020-08-24 | Stop reason: SDUPTHER

## 2020-08-24 RX ORDER — PROMETHAZINE HYDROCHLORIDE 25 MG/1
12.5 TABLET ORAL EVERY 6 HOURS PRN
Status: DISCONTINUED | OUTPATIENT
Start: 2020-08-24 | End: 2020-08-31 | Stop reason: HOSPADM

## 2020-08-24 RX ORDER — EPHEDRINE SULFATE 50 MG/ML
INJECTION INTRAVENOUS PRN
Status: DISCONTINUED | OUTPATIENT
Start: 2020-08-24 | End: 2020-08-24 | Stop reason: SDUPTHER

## 2020-08-24 RX ORDER — ROCURONIUM BROMIDE 10 MG/ML
INJECTION, SOLUTION INTRAVENOUS PRN
Status: DISCONTINUED | OUTPATIENT
Start: 2020-08-24 | End: 2020-08-24 | Stop reason: SDUPTHER

## 2020-08-24 RX ORDER — HYDRALAZINE HYDROCHLORIDE 20 MG/ML
10 INJECTION INTRAMUSCULAR; INTRAVENOUS
Status: DISCONTINUED | OUTPATIENT
Start: 2020-08-24 | End: 2020-08-31 | Stop reason: HOSPADM

## 2020-08-24 RX ORDER — SODIUM CHLORIDE 9 MG/ML
INJECTION, SOLUTION INTRAVENOUS CONTINUOUS PRN
Status: DISCONTINUED | OUTPATIENT
Start: 2020-08-24 | End: 2020-08-24 | Stop reason: SDUPTHER

## 2020-08-24 RX ORDER — SODIUM CHLORIDE 0.9 % (FLUSH) 0.9 %
10 SYRINGE (ML) INJECTION PRN
Status: DISCONTINUED | OUTPATIENT
Start: 2020-08-24 | End: 2020-08-31 | Stop reason: HOSPADM

## 2020-08-24 RX ORDER — MORPHINE SULFATE 2 MG/ML
1 INJECTION, SOLUTION INTRAMUSCULAR; INTRAVENOUS EVERY 5 MIN PRN
Status: DISCONTINUED | OUTPATIENT
Start: 2020-08-24 | End: 2020-08-24 | Stop reason: HOSPADM

## 2020-08-24 RX ORDER — PANTOPRAZOLE SODIUM 40 MG/10ML
40 INJECTION, POWDER, LYOPHILIZED, FOR SOLUTION INTRAVENOUS DAILY
Status: DISCONTINUED | OUTPATIENT
Start: 2020-08-24 | End: 2020-08-27

## 2020-08-24 RX ORDER — LABETALOL HYDROCHLORIDE 5 MG/ML
5 INJECTION, SOLUTION INTRAVENOUS EVERY 10 MIN PRN
Status: DISCONTINUED | OUTPATIENT
Start: 2020-08-24 | End: 2020-08-24 | Stop reason: HOSPADM

## 2020-08-24 RX ORDER — ALBUMIN, HUMAN INJ 5% 5 %
SOLUTION INTRAVENOUS PRN
Status: DISCONTINUED | OUTPATIENT
Start: 2020-08-24 | End: 2020-08-24 | Stop reason: SDUPTHER

## 2020-08-24 RX ORDER — SODIUM CHLORIDE 0.9 % (FLUSH) 0.9 %
10 SYRINGE (ML) INJECTION PRN
Status: DISCONTINUED | OUTPATIENT
Start: 2020-08-24 | End: 2020-08-24 | Stop reason: HOSPADM

## 2020-08-24 RX ORDER — MIDAZOLAM HYDROCHLORIDE 1 MG/ML
INJECTION INTRAMUSCULAR; INTRAVENOUS PRN
Status: DISCONTINUED | OUTPATIENT
Start: 2020-08-24 | End: 2020-08-24 | Stop reason: SDUPTHER

## 2020-08-24 RX ORDER — CALCIUM CHLORIDE 100 MG/ML
INJECTION INTRAVENOUS; INTRAVENTRICULAR PRN
Status: DISCONTINUED | OUTPATIENT
Start: 2020-08-24 | End: 2020-08-24 | Stop reason: SDUPTHER

## 2020-08-24 RX ORDER — METOPROLOL TARTRATE 5 MG/5ML
5 INJECTION INTRAVENOUS EVERY 6 HOURS SCHEDULED
Status: DISCONTINUED | OUTPATIENT
Start: 2020-08-24 | End: 2020-08-27

## 2020-08-24 RX ORDER — SODIUM CHLORIDE 0.9 % (FLUSH) 0.9 %
10 SYRINGE (ML) INJECTION EVERY 12 HOURS SCHEDULED
Status: DISCONTINUED | OUTPATIENT
Start: 2020-08-24 | End: 2020-08-24 | Stop reason: HOSPADM

## 2020-08-24 RX ORDER — NICOTINE POLACRILEX 4 MG
15 LOZENGE BUCCAL PRN
Status: DISCONTINUED | OUTPATIENT
Start: 2020-08-24 | End: 2020-08-31 | Stop reason: HOSPADM

## 2020-08-24 RX ORDER — DEXTROSE MONOHYDRATE 50 MG/ML
100 INJECTION, SOLUTION INTRAVENOUS PRN
Status: DISCONTINUED | OUTPATIENT
Start: 2020-08-24 | End: 2020-08-31 | Stop reason: HOSPADM

## 2020-08-24 RX ORDER — PHENYLEPHRINE HCL IN 0.9% NACL 1 MG/10 ML
SYRINGE (ML) INTRAVENOUS PRN
Status: DISCONTINUED | OUTPATIENT
Start: 2020-08-24 | End: 2020-08-24 | Stop reason: SDUPTHER

## 2020-08-24 RX ORDER — HEPARIN SODIUM 1000 [USP'U]/ML
INJECTION, SOLUTION INTRAVENOUS; SUBCUTANEOUS PRN
Status: DISCONTINUED | OUTPATIENT
Start: 2020-08-24 | End: 2020-08-24 | Stop reason: SDUPTHER

## 2020-08-24 RX ORDER — OXYCODONE HYDROCHLORIDE AND ACETAMINOPHEN 5; 325 MG/1; MG/1
2 TABLET ORAL PRN
Status: DISCONTINUED | OUTPATIENT
Start: 2020-08-24 | End: 2020-08-24 | Stop reason: HOSPADM

## 2020-08-24 RX ORDER — BUPIVACAINE HYDROCHLORIDE 2.5 MG/ML
INJECTION, SOLUTION EPIDURAL; INFILTRATION; INTRACAUDAL
Status: COMPLETED | OUTPATIENT
Start: 2020-08-24 | End: 2020-08-24

## 2020-08-24 RX ORDER — DIPHENHYDRAMINE HYDROCHLORIDE 50 MG/ML
12.5 INJECTION INTRAMUSCULAR; INTRAVENOUS
Status: DISCONTINUED | OUTPATIENT
Start: 2020-08-24 | End: 2020-08-24 | Stop reason: HOSPADM

## 2020-08-24 RX ORDER — NITROGLYCERIN 20 MG/100ML
5 INJECTION INTRAVENOUS CONTINUOUS PRN
Status: DISCONTINUED | OUTPATIENT
Start: 2020-08-24 | End: 2020-08-28

## 2020-08-24 RX ORDER — SODIUM CHLORIDE 0.9 % (FLUSH) 0.9 %
10 SYRINGE (ML) INJECTION EVERY 12 HOURS SCHEDULED
Status: DISCONTINUED | OUTPATIENT
Start: 2020-08-24 | End: 2020-08-31 | Stop reason: HOSPADM

## 2020-08-24 RX ORDER — PROMETHAZINE HYDROCHLORIDE 25 MG/ML
6.25 INJECTION, SOLUTION INTRAMUSCULAR; INTRAVENOUS
Status: DISCONTINUED | OUTPATIENT
Start: 2020-08-24 | End: 2020-08-24 | Stop reason: HOSPADM

## 2020-08-24 RX ORDER — DEXTROSE MONOHYDRATE 25 G/50ML
12.5 INJECTION, SOLUTION INTRAVENOUS PRN
Status: DISCONTINUED | OUTPATIENT
Start: 2020-08-24 | End: 2020-08-31 | Stop reason: HOSPADM

## 2020-08-24 RX ORDER — HYDRALAZINE HYDROCHLORIDE 20 MG/ML
5 INJECTION INTRAMUSCULAR; INTRAVENOUS EVERY 10 MIN PRN
Status: DISCONTINUED | OUTPATIENT
Start: 2020-08-24 | End: 2020-08-24 | Stop reason: HOSPADM

## 2020-08-24 RX ORDER — LIDOCAINE HYDROCHLORIDE 10 MG/ML
0.3 INJECTION, SOLUTION EPIDURAL; INFILTRATION; INTRACAUDAL; PERINEURAL
Status: DISCONTINUED | OUTPATIENT
Start: 2020-08-24 | End: 2020-08-24 | Stop reason: HOSPADM

## 2020-08-24 RX ORDER — MORPHINE SULFATE 2 MG/ML
2 INJECTION, SOLUTION INTRAMUSCULAR; INTRAVENOUS
Status: DISCONTINUED | OUTPATIENT
Start: 2020-08-24 | End: 2020-08-31 | Stop reason: HOSPADM

## 2020-08-24 RX ORDER — MORPHINE SULFATE 4 MG/ML
4 INJECTION, SOLUTION INTRAMUSCULAR; INTRAVENOUS
Status: DISCONTINUED | OUTPATIENT
Start: 2020-08-24 | End: 2020-08-31 | Stop reason: HOSPADM

## 2020-08-24 RX ORDER — ONDANSETRON HYDROCHLORIDE 8 MG/1
8 TABLET, FILM COATED ORAL EVERY 8 HOURS PRN
COMMUNITY
End: 2020-09-01 | Stop reason: SDUPTHER

## 2020-08-24 RX ORDER — POTASSIUM CHLORIDE 29.8 MG/ML
20 INJECTION INTRAVENOUS PRN
Status: DISCONTINUED | OUTPATIENT
Start: 2020-08-24 | End: 2020-08-28

## 2020-08-24 RX ORDER — ACETAMINOPHEN 325 MG/1
650 TABLET ORAL EVERY 4 HOURS PRN
Status: DISCONTINUED | OUTPATIENT
Start: 2020-08-24 | End: 2020-08-31 | Stop reason: HOSPADM

## 2020-08-24 RX ORDER — ONDANSETRON 2 MG/ML
4 INJECTION INTRAMUSCULAR; INTRAVENOUS PRN
Status: DISCONTINUED | OUTPATIENT
Start: 2020-08-24 | End: 2020-08-24 | Stop reason: HOSPADM

## 2020-08-24 RX ORDER — LIDOCAINE HYDROCHLORIDE 20 MG/ML
INJECTION, SOLUTION EPIDURAL; INFILTRATION; INTRACAUDAL; PERINEURAL PRN
Status: DISCONTINUED | OUTPATIENT
Start: 2020-08-24 | End: 2020-08-24 | Stop reason: SDUPTHER

## 2020-08-24 RX ORDER — SODIUM CHLORIDE 9 MG/ML
INJECTION, SOLUTION INTRAVENOUS CONTINUOUS
Status: DISCONTINUED | OUTPATIENT
Start: 2020-08-24 | End: 2020-08-29

## 2020-08-24 RX ORDER — ONDANSETRON 2 MG/ML
INJECTION INTRAMUSCULAR; INTRAVENOUS PRN
Status: DISCONTINUED | OUTPATIENT
Start: 2020-08-24 | End: 2020-08-24 | Stop reason: SDUPTHER

## 2020-08-24 RX ORDER — SODIUM CHLORIDE, SODIUM LACTATE, POTASSIUM CHLORIDE, CALCIUM CHLORIDE 600; 310; 30; 20 MG/100ML; MG/100ML; MG/100ML; MG/100ML
INJECTION, SOLUTION INTRAVENOUS CONTINUOUS
Status: DISCONTINUED | OUTPATIENT
Start: 2020-08-24 | End: 2020-08-24

## 2020-08-24 RX ORDER — MAGNESIUM SULFATE 1 G/100ML
1 INJECTION INTRAVENOUS PRN
Status: DISCONTINUED | OUTPATIENT
Start: 2020-08-24 | End: 2020-08-31 | Stop reason: HOSPADM

## 2020-08-24 RX ADMIN — ONDANSETRON 4 MG: 2 INJECTION INTRAMUSCULAR; INTRAVENOUS at 21:22

## 2020-08-24 RX ADMIN — HEPARIN SODIUM 5000 UNITS: 1000 INJECTION, SOLUTION INTRAVENOUS; SUBCUTANEOUS at 09:01

## 2020-08-24 RX ADMIN — SODIUM CHLORIDE, POTASSIUM CHLORIDE, SODIUM LACTATE AND CALCIUM CHLORIDE: 600; 310; 30; 20 INJECTION, SOLUTION INTRAVENOUS at 10:30

## 2020-08-24 RX ADMIN — SUGAMMADEX 200 MG: 100 INJECTION, SOLUTION INTRAVENOUS at 10:48

## 2020-08-24 RX ADMIN — CEFAZOLIN 2 G: 10 INJECTION, POWDER, FOR SOLUTION INTRAVENOUS at 07:05

## 2020-08-24 RX ADMIN — MORPHINE SULFATE 4 MG: 4 INJECTION, SOLUTION INTRAMUSCULAR; INTRAVENOUS at 14:11

## 2020-08-24 RX ADMIN — SODIUM CHLORIDE, POTASSIUM CHLORIDE, SODIUM LACTATE AND CALCIUM CHLORIDE: 600; 310; 30; 20 INJECTION, SOLUTION INTRAVENOUS at 07:03

## 2020-08-24 RX ADMIN — EPHEDRINE SULFATE 5 MG: 50 INJECTION INTRAVENOUS at 08:32

## 2020-08-24 RX ADMIN — BUPIVACAINE 20 ML: 13.3 INJECTION, SUSPENSION, LIPOSOMAL INFILTRATION at 11:25

## 2020-08-24 RX ADMIN — METOPROLOL TARTRATE 5 MG: 5 INJECTION INTRAVENOUS at 16:49

## 2020-08-24 RX ADMIN — INSULIN LISPRO 2 UNITS: 100 INJECTION, SOLUTION INTRAVENOUS; SUBCUTANEOUS at 16:51

## 2020-08-24 RX ADMIN — Medication 80 MCG: at 10:21

## 2020-08-24 RX ADMIN — MORPHINE SULFATE 4 MG: 4 INJECTION, SOLUTION INTRAMUSCULAR; INTRAVENOUS at 21:17

## 2020-08-24 RX ADMIN — ALBUMIN (HUMAN) 250 ML: 12.5 INJECTION, SOLUTION INTRAVENOUS at 09:53

## 2020-08-24 RX ADMIN — EPHEDRINE SULFATE 5 MG: 50 INJECTION INTRAVENOUS at 08:26

## 2020-08-24 RX ADMIN — MORPHINE SULFATE 4 MG: 4 INJECTION, SOLUTION INTRAMUSCULAR; INTRAVENOUS at 13:26

## 2020-08-24 RX ADMIN — SODIUM CHLORIDE: 9 INJECTION, SOLUTION INTRAVENOUS at 17:35

## 2020-08-24 RX ADMIN — SODIUM BICARBONATE 25 MEQ: 84 INJECTION, SOLUTION INTRAVENOUS at 10:02

## 2020-08-24 RX ADMIN — ALBUMIN (HUMAN) 250 ML: 12.5 INJECTION, SOLUTION INTRAVENOUS at 08:03

## 2020-08-24 RX ADMIN — SODIUM BICARBONATE 25 MEQ: 84 INJECTION, SOLUTION INTRAVENOUS at 10:00

## 2020-08-24 RX ADMIN — MIDAZOLAM HYDROCHLORIDE 2 MG: 2 INJECTION, SOLUTION INTRAMUSCULAR; INTRAVENOUS at 07:05

## 2020-08-24 RX ADMIN — SODIUM CHLORIDE: 9 INJECTION, SOLUTION INTRAVENOUS at 09:45

## 2020-08-24 RX ADMIN — DEXAMETHASONE SODIUM PHOSPHATE 8 MG: 4 INJECTION, SOLUTION INTRAMUSCULAR; INTRAVENOUS at 07:35

## 2020-08-24 RX ADMIN — EPHEDRINE SULFATE 10 MG: 50 INJECTION INTRAVENOUS at 08:31

## 2020-08-24 RX ADMIN — MORPHINE SULFATE 4 MG: 4 INJECTION, SOLUTION INTRAMUSCULAR; INTRAVENOUS at 16:50

## 2020-08-24 RX ADMIN — Medication 80 MCG: at 08:31

## 2020-08-24 RX ADMIN — EPHEDRINE SULFATE 5 MG: 50 INJECTION INTRAVENOUS at 08:28

## 2020-08-24 RX ADMIN — SODIUM CHLORIDE: 9 INJECTION, SOLUTION INTRAVENOUS at 12:15

## 2020-08-24 RX ADMIN — ROCURONIUM BROMIDE 50 MG: 10 SOLUTION INTRAVENOUS at 07:50

## 2020-08-24 RX ADMIN — Medication 10 ML: at 20:59

## 2020-08-24 RX ADMIN — CALCIUM CHLORIDE 0.5 G: 100 INJECTION, SOLUTION INTRAVENOUS at 10:04

## 2020-08-24 RX ADMIN — METOPROLOL TARTRATE 5 MG: 5 INJECTION INTRAVENOUS at 14:11

## 2020-08-24 RX ADMIN — Medication 80 MCG: at 09:26

## 2020-08-24 RX ADMIN — PANTOPRAZOLE SODIUM 40 MG: 40 INJECTION, POWDER, FOR SOLUTION INTRAVENOUS at 14:10

## 2020-08-24 RX ADMIN — BUPIVACAINE HYDROCHLORIDE 40 ML: 2.5 INJECTION, SOLUTION EPIDURAL; INFILTRATION; INTRACAUDAL; PERINEURAL at 11:25

## 2020-08-24 RX ADMIN — CALCIUM CHLORIDE 0.5 G: 100 INJECTION, SOLUTION INTRAVENOUS at 10:02

## 2020-08-24 RX ADMIN — EPHEDRINE SULFATE 5 MG: 50 INJECTION INTRAVENOUS at 08:27

## 2020-08-24 RX ADMIN — Medication 80 MCG: at 07:34

## 2020-08-24 RX ADMIN — SODIUM CHLORIDE: 9 INJECTION, SOLUTION INTRAVENOUS at 07:40

## 2020-08-24 RX ADMIN — ROCURONIUM BROMIDE 20 MG: 10 SOLUTION INTRAVENOUS at 09:13

## 2020-08-24 RX ADMIN — ROCURONIUM BROMIDE 50 MG: 10 SOLUTION INTRAVENOUS at 07:18

## 2020-08-24 RX ADMIN — Medication 80 MCG: at 10:35

## 2020-08-24 RX ADMIN — MORPHINE SULFATE 4 MG: 4 INJECTION, SOLUTION INTRAMUSCULAR; INTRAVENOUS at 19:38

## 2020-08-24 RX ADMIN — FENTANYL CITRATE 100 MCG: 50 INJECTION INTRAMUSCULAR; INTRAVENOUS at 07:03

## 2020-08-24 RX ADMIN — CEFAZOLIN 1 G: 10 INJECTION, POWDER, FOR SOLUTION INTRAVENOUS at 11:01

## 2020-08-24 RX ADMIN — PROPOFOL 100 MG: 10 INJECTION, EMULSION INTRAVENOUS at 07:18

## 2020-08-24 RX ADMIN — LIDOCAINE HYDROCHLORIDE 100 MG: 20 INJECTION, SOLUTION EPIDURAL; INFILTRATION; INTRACAUDAL; PERINEURAL at 07:03

## 2020-08-24 RX ADMIN — ONDANSETRON 4 MG: 2 INJECTION INTRAMUSCULAR; INTRAVENOUS at 07:35

## 2020-08-24 ASSESSMENT — PULMONARY FUNCTION TESTS
PIF_VALUE: 30
PIF_VALUE: 29
PIF_VALUE: 29
PIF_VALUE: 0
PIF_VALUE: 28
PIF_VALUE: 3
PIF_VALUE: 26
PIF_VALUE: 27
PIF_VALUE: 31
PIF_VALUE: 29
PIF_VALUE: 30
PIF_VALUE: 3
PIF_VALUE: 29
PIF_VALUE: 20
PIF_VALUE: 28
PIF_VALUE: 30
PIF_VALUE: 26
PIF_VALUE: 26
PIF_VALUE: 28
PIF_VALUE: 28
PIF_VALUE: 3
PIF_VALUE: 28
PIF_VALUE: 32
PIF_VALUE: 29
PIF_VALUE: 26
PIF_VALUE: 3
PIF_VALUE: 30
PIF_VALUE: 31
PIF_VALUE: 28
PIF_VALUE: 29
PIF_VALUE: 24
PIF_VALUE: 27
PIF_VALUE: 30
PIF_VALUE: 24
PIF_VALUE: 28
PIF_VALUE: 26
PIF_VALUE: 28
PIF_VALUE: 31
PIF_VALUE: 29
PIF_VALUE: 3
PIF_VALUE: 4
PIF_VALUE: 28
PIF_VALUE: 25
PIF_VALUE: 0
PIF_VALUE: 29
PIF_VALUE: 29
PIF_VALUE: 1
PIF_VALUE: 32
PIF_VALUE: 3
PIF_VALUE: 28
PIF_VALUE: 30
PIF_VALUE: 28
PIF_VALUE: 28
PIF_VALUE: 4
PIF_VALUE: 30
PIF_VALUE: 20
PIF_VALUE: 25
PIF_VALUE: 28
PIF_VALUE: 29
PIF_VALUE: 30
PIF_VALUE: 26
PIF_VALUE: 30
PIF_VALUE: 29
PIF_VALUE: 1
PIF_VALUE: 28
PIF_VALUE: 29
PIF_VALUE: 0
PIF_VALUE: 28
PIF_VALUE: 28
PIF_VALUE: 26
PIF_VALUE: 29
PIF_VALUE: 3
PIF_VALUE: 29
PIF_VALUE: 26
PIF_VALUE: 28
PIF_VALUE: 28
PIF_VALUE: 30
PIF_VALUE: 26
PIF_VALUE: 4
PIF_VALUE: 31
PIF_VALUE: 29
PIF_VALUE: 28
PIF_VALUE: 28
PIF_VALUE: 26
PIF_VALUE: 1
PIF_VALUE: 4
PIF_VALUE: 26
PIF_VALUE: 3
PIF_VALUE: 3
PIF_VALUE: 26
PIF_VALUE: 1
PIF_VALUE: 2
PIF_VALUE: 4
PIF_VALUE: 26
PIF_VALUE: 31
PIF_VALUE: 29
PIF_VALUE: 27
PIF_VALUE: 28
PIF_VALUE: 0
PIF_VALUE: 3
PIF_VALUE: 26
PIF_VALUE: 28
PIF_VALUE: 28
PIF_VALUE: 31
PIF_VALUE: 29
PIF_VALUE: 1
PIF_VALUE: 27
PIF_VALUE: 30
PIF_VALUE: 31
PIF_VALUE: 23
PIF_VALUE: 29
PIF_VALUE: 29
PIF_VALUE: 3
PIF_VALUE: 28
PIF_VALUE: 3
PIF_VALUE: 1
PIF_VALUE: 28
PIF_VALUE: 27
PIF_VALUE: 28
PIF_VALUE: 29
PIF_VALUE: 28
PIF_VALUE: 30
PIF_VALUE: 32
PIF_VALUE: 31
PIF_VALUE: 28
PIF_VALUE: 4
PIF_VALUE: 30
PIF_VALUE: 28
PIF_VALUE: 4
PIF_VALUE: 4
PIF_VALUE: 28
PIF_VALUE: 31
PIF_VALUE: 17
PIF_VALUE: 30
PIF_VALUE: 28
PIF_VALUE: 29
PIF_VALUE: 28
PIF_VALUE: 27
PIF_VALUE: 26
PIF_VALUE: 30
PIF_VALUE: 31
PIF_VALUE: 28
PIF_VALUE: 28
PIF_VALUE: 1
PIF_VALUE: 30
PIF_VALUE: 27
PIF_VALUE: 29
PIF_VALUE: 26
PIF_VALUE: 30
PIF_VALUE: 11
PIF_VALUE: 25
PIF_VALUE: 2
PIF_VALUE: 15
PIF_VALUE: 28
PIF_VALUE: 28
PIF_VALUE: 31
PIF_VALUE: 28
PIF_VALUE: 29
PIF_VALUE: 28
PIF_VALUE: 26
PIF_VALUE: 5
PIF_VALUE: 27
PIF_VALUE: 4
PIF_VALUE: 28
PIF_VALUE: 4
PIF_VALUE: 28
PIF_VALUE: 31
PIF_VALUE: 29
PIF_VALUE: 26
PIF_VALUE: 26
PIF_VALUE: 27
PIF_VALUE: 30
PIF_VALUE: 1
PIF_VALUE: 22
PIF_VALUE: 28
PIF_VALUE: 30
PIF_VALUE: 20
PIF_VALUE: 30
PIF_VALUE: 28
PIF_VALUE: 29
PIF_VALUE: 30
PIF_VALUE: 30
PIF_VALUE: 3
PIF_VALUE: 29
PIF_VALUE: 20
PIF_VALUE: 28
PIF_VALUE: 29
PIF_VALUE: 27
PIF_VALUE: 30
PIF_VALUE: 26
PIF_VALUE: 27
PIF_VALUE: 29
PIF_VALUE: 27
PIF_VALUE: 28
PIF_VALUE: 27
PIF_VALUE: 30
PIF_VALUE: 28
PIF_VALUE: 27
PIF_VALUE: 30
PIF_VALUE: 28
PIF_VALUE: 27
PIF_VALUE: 26
PIF_VALUE: 3
PIF_VALUE: 26
PIF_VALUE: 32
PIF_VALUE: 25
PIF_VALUE: 29
PIF_VALUE: 28
PIF_VALUE: 29
PIF_VALUE: 27
PIF_VALUE: 1
PIF_VALUE: 29
PIF_VALUE: 26
PIF_VALUE: 23
PIF_VALUE: 26
PIF_VALUE: 28
PIF_VALUE: 1
PIF_VALUE: 29
PIF_VALUE: 31
PIF_VALUE: 26
PIF_VALUE: 31
PIF_VALUE: 32
PIF_VALUE: 31
PIF_VALUE: 29
PIF_VALUE: 32
PIF_VALUE: 28
PIF_VALUE: 32
PIF_VALUE: 31
PIF_VALUE: 7
PIF_VALUE: 30
PIF_VALUE: 3
PIF_VALUE: 32
PIF_VALUE: 29
PIF_VALUE: 1
PIF_VALUE: 29
PIF_VALUE: 30
PIF_VALUE: 30
PIF_VALUE: 29
PIF_VALUE: 2
PIF_VALUE: 27
PIF_VALUE: 26
PIF_VALUE: 30
PIF_VALUE: 25
PIF_VALUE: 30
PIF_VALUE: 26
PIF_VALUE: 29
PIF_VALUE: 26
PIF_VALUE: 28
PIF_VALUE: 26
PIF_VALUE: 26
PIF_VALUE: 23
PIF_VALUE: 27
PIF_VALUE: 29
PIF_VALUE: 28
PIF_VALUE: 22
PIF_VALUE: 1
PIF_VALUE: 29

## 2020-08-24 ASSESSMENT — PAIN DESCRIPTION - DESCRIPTORS
DESCRIPTORS: TINGLING;ACHING;BURNING
DESCRIPTORS: ACHING

## 2020-08-24 ASSESSMENT — PAIN SCALES - GENERAL
PAINLEVEL_OUTOF10: 10
PAINLEVEL_OUTOF10: 8
PAINLEVEL_OUTOF10: 10
PAINLEVEL_OUTOF10: 7
PAINLEVEL_OUTOF10: 0
PAINLEVEL_OUTOF10: 10
PAINLEVEL_OUTOF10: 7
PAINLEVEL_OUTOF10: 8
PAINLEVEL_OUTOF10: 10

## 2020-08-24 ASSESSMENT — PAIN DESCRIPTION - PROGRESSION
CLINICAL_PROGRESSION: NOT CHANGED

## 2020-08-24 ASSESSMENT — PAIN DESCRIPTION - FREQUENCY: FREQUENCY: CONTINUOUS

## 2020-08-24 ASSESSMENT — PAIN DESCRIPTION - LOCATION
LOCATION: ABDOMEN;GROIN
LOCATION: ABDOMEN

## 2020-08-24 ASSESSMENT — PAIN DESCRIPTION - ORIENTATION
ORIENTATION: MID
ORIENTATION: MID;RIGHT;LEFT

## 2020-08-24 ASSESSMENT — PAIN - FUNCTIONAL ASSESSMENT
PAIN_FUNCTIONAL_ASSESSMENT: PREVENTS OR INTERFERES SOME ACTIVE ACTIVITIES AND ADLS
PAIN_FUNCTIONAL_ASSESSMENT: 0-10

## 2020-08-24 ASSESSMENT — COPD QUESTIONNAIRES: CAT_SEVERITY: SEVERE

## 2020-08-24 ASSESSMENT — LIFESTYLE VARIABLES: SMOKING_STATUS: 1

## 2020-08-24 ASSESSMENT — PAIN DESCRIPTION - PAIN TYPE
TYPE: ACUTE PAIN
TYPE: ACUTE PAIN;SURGICAL PAIN

## 2020-08-24 ASSESSMENT — PAIN DESCRIPTION - ONSET: ONSET: ON-GOING

## 2020-08-24 NOTE — PROGRESS NOTES
Pt admitted from OR to  at this time. Pt A&Ox4. No s/s distress. Tele SR. Pt c/o post sx abd and BL groin pain  10-10 . PRN Morphine per orders. Midline drsg shadow drainage marked for reference. O2 2lpm. Castrejon secure and draining. Assessment complete. Will monitor.

## 2020-08-24 NOTE — ANESTHESIA POSTPROCEDURE EVALUATION
Department of Anesthesiology  Postprocedure Note    Patient: Cris Walker  MRN: [id number]  YOB: 1955  Date of evaluation: 8/24/2020  Time:  1:20 PM     Procedure Summary     Date:  08/24/20 Room / Location:  [place] [address] (HYBRID) / [facility]    Anesthesia Start:  0703 Anesthesia Stop:  2902    Procedure:  AORTOBIFEMORAL BYPASS GRAFT (N/A Abdomen) Diagnosis:       Peripheral vascular disease, unspecified ([facility] [address]      (PERIPHERAL VASCULAR DISEASE)    Surgeon: Sonia Frias MD Responsible Provider:  Polly Kelley MD    Anesthesia Type:  general ASA Status:  4          Anesthesia Type: general    Radha Phase I:      Radha Phase II:      Last vitals: Reviewed and per EMR flowsheets.        Anesthesia Post Evaluation    Patient location during evaluation: ICU  Patient participation: complete - patient cannot participate  Level of consciousness: sleepy but conscious  Pain score: 0  Airway patency: patent  Nausea & Vomiting: no nausea and no vomiting  Complications: no  Cardiovascular status: blood pressure returned to baseline  Respiratory status: acceptable  Hydration status: stable

## 2020-08-24 NOTE — PROGRESS NOTES
Preoperative Screening for Elective Surgery/Invasive Procedures While COVID-19 present in the community    o Have you had any of the following symptoms? no new symptoms  o Fever, chills  o Cough  o Shortness of breath  o Muscle aches/pain  o Diarrhea  o Abdominal pain, nausea, vomiting  o Loss or decrease in taste and / or smell   Risk of Exposure  o Have you recently been hospitalized for COVID-19 or flu-like illness, if so when? No  o Recently diagnosed with COVID-19, if so when? no  o Recently tested for COVID-19, if so when? preop testing 8/19  o Have you been in close contact with a person or family member who currently has or recently had [phone]? If yes, when and in what context? no  o Do you live with anybody who in the last 14 days has had fever, chills, shortness of breath, muscle aches, flu-like illness? no  o Do you have any close contacts or family members who are currently in the hospital for COVID-19 or flu-like illness? If yes, assess recent close contact with this person. no    Indicate if the patient has a positive screen by answering yes to one or more of the above questions. Patients who test positive or screen positive prior to surgery or on the day of surgery should be evaluated in conjunction with the surgeon/proceduralist/anesthesiologist to determine the urgency of the procedure.

## 2020-08-24 NOTE — ANESTHESIA PRE PROCEDURE
1 mg daily  12/31/19  Yes Chas Diaz MD   methocarbamol (ROBAXIN) 750 MG tablet TAKE ONE TABLET BY MOUTH FOUR TIMES A DAY AS NEEDED FOR MUSCLE SPASMS  Patient taking differently: Take 750 mg by mouth 4 times daily as needed  11/11/19  Yes Chas Diaz MD   budesonide (PULMICORT) 0.5 MG/2ML nebulizer suspension Take 2 mLs by nebulization 2 times daily 7/9/19  Yes Chas Diaz MD   oxyCODONE (ROXICODONE) 5 MG immediate release tablet Take 5 mg by mouth every 6 hours as needed.  Indications: Chronic Pain  8/10/17  Yes Historical Provider, MD   ondansetron (ZOFRAN) 8 MG tablet Take 8 mg by mouth every 8 hours as needed for Nausea or Vomiting    Historical Provider, MD   naloxone (NARCAN) 4 MG/0.1ML LIQD nasal spray 1 spray by Nasal route as needed for Opioid Reversal May repeat after 3 minutes if no or minimal response as directed    Historical Provider, MD   FREESTYLE LITE strip USE 1 STRIP IN VITRO ROUTE 2 TIMES DAILY 3/17/20   Chas Diaz MD   Alcohol Swabs (ALCOHOL PREP) 70 % PADS USE AS DIRECTED 2/25/20   Chas Diaz MD   FREESTYLE LANCETS [address] [facility] TEST 2 TIMES DAILY 1/7/20   Chas Diaz MD   Blood Pressure Monitoring (BLOOD PRESSURE CUFF) MISC Please dispense insurance preference 7/18/19   Chas Diaz MD   UNIFINE PENTIPS 31G X 8 MM MISC USE ONCE DAILY WITH INJECTION 1/20/19   Chas Diaz MD   Blood Glucose Monitoring Suppl (FREESTYLE FREEDOM LITE) w/Device KIT 1 kit by Does not apply route daily 12/14/18   NAYELY Minor - CNP   OXYGEN Inhale 2 L/min into the lungs nightly as needed (and daily prn)     Historical Provider, MD       Current medications:    Current Facility-Administered Medications   Medication Dose Route Frequency Provider Last Rate Last Dose    lactated ringers infusion   Intravenous Continuous Roz Ritter MD        sodium chloride flush 0.9 % injection 10 mL  10 mL Intravenous 2 times per day Albania Wiggins MD        sodium chloride flush 0.9 % injection 10 mL  10 mL Intravenous PRN Albania Wiggins MD        lidocaine PF 1 % injection 0.3 mL  0.3 mL Intradermal Once PRN Albania Wiggins MD        ceFAZolin (ANCEF) 2 g in dextrose 5 % 100 mL IVPB  2 g Intravenous On Call to Cat Maravilla MD        sodium chloride flush 0.9 % injection 10 mL  10 mL Intravenous 2 times per day Anne Truong MD        sodium chloride flush 0.9 % injection 10 mL  10 mL Intravenous PRN Anne Truong MD           Allergies:     Allergies   Allergen Reactions    Lisinopril Swelling    Ace Inhibitors Swelling    Influenza Vaccines      He states he was hospitalized when he received his first flu vaccine    Spiriva Handihaler [Tiotropium Bromide Monohydrate] Swelling     Tolerates both tudorza and incruse       Problem List:    Patient Active Problem List   Diagnosis Code    Benign essential HTN I10    Hilar adenopathy R59.0    Tobacco abuse Z72.0    GERD (gastroesophageal reflux disease) K21.9    Chest pain R07.9    Gout M10.9    Paresthesia of bilateral legs R20.2    Right knee pain M25.561    Numbness and tingling of right leg R20.0, R20.2    Osteoarthritis of multiple joints M15.9    Supraclavicular fossa fullness R22.2    Carpal tunnel syndrome of left wrist G56.02    Alcohol abuse F10.10    Hyponatremia E87.1    Hepatomegaly R16.0    Chronic alcoholic hepatitis V56.90    Ulnar neuropathy at elbow G56.20    Chronic pain G89.29    Malignant neoplasm of urinary bladder (HCC) C67.9    Mixed simple and mucopurulent chronic bronchitis (HCC) J41.8    Ulnar nerve entrapment G56.20    Mixed hyperlipidemia E78.2    Bladder outlet obstruction N32.0    Diabetic ketoacidosis without coma associated with type 2 diabetes mellitus (HCC) E11.10    Insulin dependent type 2 diabetes mellitus, uncontrolled ([address] E11.65, Z79.4    Bronchiectasis without complication ([address] F27.8    Onychomycosis B35.1    Tinea pedis of both feet B35.3    Chronic idiopathic constipation O18.54    Alcoholic cirrhosis of liver without ascites (HCC) K70.30    Family history of rectal cancer Z80.0    Rectal bleeding K62.5    Polyp of colon K63.5    Diverticulosis of intestine without bleeding K57.90    Secondary esophageal varices without bleeding (HCC) I85.10    Erosive gastritis K29.60    Multiple duodenal ulcers K29.81    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, 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.60    Iron deficiency anemia D50.9    AVM (arteriovenous malformation) of small bowel, acquired K55.20    Uncontrolled hypertension I10    Severe claudication (HCC) I73.9    Hypercalcemia E83.52    MICHAEL (acute kidney injury) ([facility] [address] N17.9    PVD (peripheral vascular disease) (HCC) I73.9    Other constipation K59.09    Essential hypertension I10    Hypokalemia E87.6    Peripheral vascular disease, unspecified (HCC) I73.9       Past Medical History:        Diagnosis Date    Bronchitis chronic     Chest pain     Chronic cough     Cirrhosis of liver (HCC) 01/01/2017    stage 4     COPD (chronic obstructive pulmonary disease) (HCC)     COPD (chronic obstructive pulmonary disease) (HCC)     Diabetes mellitus (HCC)     Gout     Hilar adenopathy     Hyperlipidemia     Hypertension     Knee pain, right     Numbness and tingling of leg     Osteoarthritis     Paresthesia of bilateral legs     PVD (peripheral vascular disease) ([facility] [address]     Seizures ([facility] [address]     ongoing, began after swine flu vaccination    Substance abuse [facility])        Past Surgical History:        Procedure Laterality Date    BRONCHOSCOPY  2/7/2011    bronch with EBUS    COLONOSCOPY N/A 7/23/2018    COLONOSCOPY WITH BIOPSY performed by Fredrick Ge MD at [address] COLONOSCOPY N/A 7/23/2018    COLONOSCOPY POLYPECTOMY SNARE/COLD BIOPSY performed by Fredrick Ge MD at [facility]  2/4/15    Urethral Dilatation, Resection of Bladder Tumor    CYSTOSCOPY  2016    fulgeration of bladder tumor    ELBOW SURGERY Left     ENDOSCOPY, SMALL BOWEL N/A 2019    BOWEL SMALL CAPSULE ENDOSCOPY performed by Chandana Grayson MD at [place] [address]  2014    cystoscopy, bladder biopsy    OTHER SURGICAL HISTORY  2015    cystoscopy, urethral dilatation, bladder tumor follow up   [address] N/A 2018    EGD BIOPSY performed by Chandana Grayson MD at [facility]. An 80 History:    Social History     Tobacco Use    Smoking status: Current Every Day Smoker     Packs/day: 0.50     Years: 54.00     Pack years: 27.00     Types: Cigarettes, Cigars     Start date: 2018     Last attempt to quit: 2020     Years since quittin.0    Smokeless tobacco: Former User     Types: Snuff    Tobacco comment: less than 1/2 pk   Substance Use Topics    Alcohol use: Not Currently     Alcohol/week: 2.0 standard drinks     Types: 2 Cans of beer per week     Frequency: Monthly or less                                Ready to quit: Not Answered  Counseling given: Not Answered  Comment: less than 1/2 pk      Vital Signs (Current):   Vitals:    20 1159 20 0553   BP:  (!) 159/61   Pulse:  79   Resp:  20   Temp:  98.2 °F (36.8 °C)   TempSrc:  Temporal   SpO2:  97%   Weight: 210 lb (95.3 kg) 213 lb 4 oz (96.7 kg)   Height: 5' 7\" (1.702 m) 5' 7\" (1.702 m)                                              BP Readings from Last 3 Encounters:   20 (!) 159/61   20 (!) 167/82   20 (!) 138/57       NPO Status: Time of last liquid consumption: 0300(sip of water)                        Time of last solid consumption:                         Date of last liquid consumption: 20                        Date of last solid food consumption: 20    BMI:   Wt Readings from Last 3 Encounters:   20 213 lb 4 oz (96.7 kg)   08/06/20 206 lb 3.2 oz (93.5 kg)   05/06/20 221 lb (100.2 kg)     Body mass index is 33.4 kg/m².     CBC:   Lab Results   Component Value Date    WBC 10.3 08/24/2020    RBC 4.36 08/24/2020    HGB 10.1 08/24/2020    HCT 32.5 08/24/2020    MCV 74.5 08/24/2020    RDW 21.8 08/24/2020     08/24/2020       CMP:   Lab Results   Component Value Date     08/24/2020    K 3.6 08/24/2020     08/24/2020    CO2 28 08/24/2020    BUN 7 08/24/2020    CREATININE 0.8 08/24/2020    GFRAA >60 08/24/2020    GFRAA >60 04/02/2013    AGRATIO 1.0 08/03/2020    LABGLOM >60 08/24/2020    GLUCOSE 111 08/24/2020    PROT 8.7 08/03/2020    PROT 9.1 01/12/2011    CALCIUM 9.6 08/24/2020    BILITOT <0.2 08/03/2020    ALKPHOS 132 08/03/2020    AST 20 08/03/2020    ALT 19 08/03/2020       POC Tests:   Recent Labs     08/24/20  0603   POCGLU 110*       Coags:   Lab Results   Component Value Date    PROTIME 12.0 05/05/2020    INR 1.03 05/05/2020    APTT 31.7 06/29/2019       HCG (If Applicable): No results found for: PREGTESTUR, PREGSERUM, HCG, HCGQUANT     ABGs: No results found for: PHART, PO2ART, VPS3SJC, XRM9VTQ, BEART, F3BCEUQJ     Type & Screen (If Applicable):  No results found for: LABABO, LABRH    Drug/Infectious Status (If Applicable):  No results found for: HIV, HEPCAB    COVID-19 Screening (If Applicable):   Lab Results   Component Value Date    COVID19 NOT DETECTED 08/19/2020         Anesthesia Evaluation  Patient summary reviewed and Nursing notes reviewed  Airway: Mallampati: III  TM distance: >3 FB   Neck ROM: full  Mouth opening: < 3 FB Dental:    (+) edentulous      Pulmonary:   (+) COPD: severe,  decreased breath sounds,  current smoker                          ROS comment: On 2L Oxygen by NC at night   Cardiovascular:    (+) hypertension:,         Rhythm: regular  Rate: normal                    Neuro/Psych:   (+) psychiatric history:             ROS comment: Chronic pain GI/Hepatic/Renal:   (+) GERD:, PUD, liver disease:, renal disease: ARF,          ROS comment: Cirrhosis stage IV. Endo/Other:    (+) DiabetesType II DM, , .                 Abdominal:   (+) obese,         Vascular:   + PVD, aortic or cerebral, . Anesthesia Plan      general     ASA 4       Induction: intravenous. arterial line  MIPS: Postoperative opioids intended and Prophylactic antiemetics administered. Anesthetic plan and risks discussed with patient. Plan discussed with CRNA.                   Sandra Lyons MD   8/24/2020

## 2020-08-24 NOTE — ANESTHESIA PROCEDURE NOTES
Arterial Line:    An arterial line was placed using surface landmarks, in the OR for the following indication(s): continuous blood pressure monitoring and blood sampling needed. A 20 gauge (size), (length), Arrow (type) catheter was placed, Seldinger technique used, into the right radial artery, secured by Tegaderm. Anesthesia type: Local  Local infiltration: Injection    Events:  patient tolerated procedure well with no complications and EBL 0mL.  8/24/2020 7:10 AM8/24/2020 7:12 AM  Anesthesiologist: Lady Bony MD  Resident/CRNA: NAYELY Santillan - CRNA  Performed:  Other anesthesia staff   Preanesthetic Checklist  Completed: patient identified, surgical consent, pre-op evaluation, timeout performed, IV checked, risks and benefits discussed, monitors and equipment checked, oxygen available and patient being monitored

## 2020-08-24 NOTE — ANESTHESIA PROCEDURE NOTES
Central Venous Line:    A central venous line was placed using ultrasound guidance and surface landmarks, in the OR for the following indication(s): central venous access and CVP monitoring. 8/24/2020 7:18 AM8/24/2020 7:28 AM    Sterility preparation included the following: hand hygiene performed prior to procedure, maximum sterile barriers used and sterile technique used to drape from head to toe. The patient was placed in Trendelenburg position. The    The site was prepped with Chloraprep. A 9 Fr (size), triple lumen was placed. During the procedure, the following specific steps were taken: target vein identified, needle advanced into vein and blood aspirated and guidewire advanced into vein. Intravenous verification was obtained by ultrasound and venous blood return. Post insertion care included: all ports aspirated and all ports flushed easily. During the procedure the patient experienced: patient tolerated procedure well with no complications.       Anesthesia type: general  Staffing  Anesthesiologist: Ramírez Ratliff MD  Preanesthetic Checklist  Completed: patient identified, timeout performed and patient being monitored

## 2020-08-24 NOTE — CARE COORDINATION
CASE MANAGEMENT INITIAL ASSESSMENT      Reviewed chart and completed assessment with: patient   Explained Case Management role/services. Primary contact information: Akilah Tate [phone]    Admit date/status: 8/24/20  Diagnosis: peripheral vascular disease  Is this a Readmission?:  If so, please indicate possible factors that led to readmission. YES, procedure    Insurance: caresource   Precert required for SNF - yes   3 night stay required - no    Living arrangements, Adls, care needs, prior to admission: pt lives in a mobile home with his girl friend. Independent in all ADL's   3 steps to enter. Transportation: private    [address] [place] [address] at home: Walker__Cane__RTS__ BSC__Shower Chair__  02_X-Leading respiratory _ HHN__ CPAP__  BiPap__  Hospital Bed__ W/C___ Other__________    Services in the home and/or outpatient, prior to admission: none    PT/OT recs: none    Hospital Exemption Notification (HEN): not initiated    Barriers to discharge: none    Plan/comments:   Spoke to patient at bedside. Pt states that he is ormally independent prior to admission. Denies any current needs. Pt recently in hospital and declined [facility] at that time. Will follow for needs.  Jackelin Roy RN     ECOC on chart for MD signature

## 2020-08-24 NOTE — PROGRESS NOTES
4 Eyes Skin Assessment     The patient is being assess for  Post-Op Surgical    I agree that 2 RN's have performed a thorough Head to Toe Skin Assessment on the patient. ALL assessment sites listed below have been assessed. Areas assessed by both nurses: Marlee Boop   [x]   Head, Face, and Ears   [x]   Shoulders, Back, and Chest  [x]   Arms, Elbows, and Hands   [x]   Coccyx, Sacrum, and IschIum  [x]   Legs, Feet, and Heels        Does the Patient have Skin Breakdown?   Yes LDA WOUND CARE was Initiated documentation include the Giana-wound, Wound Assessment, Measurements, Dressing Treatment, Drainage, and Color\",  Pt with post-op wounds        Abraham Prevention initiated:  NA   Wound Care Orders initiated:  NA      WOC nurse consulted for Pressure Injury (Stage 3,4, Unstageable, DTI, NWPT, and Complex wounds), New and Established Ostomies:  NA      Nurse 1 eSignature: Electronically signed by Shereen Weinstein RN on 8/24/20 at 7:54 PM EDT    **SHARE this note so that the co-signing nurse is able to place an eSignature**    Nurse 2 eSignature: {Esignature:[id number]}

## 2020-08-24 NOTE — H&P
urethral dilatation, bladder tumor follow up    UPPER GASTROINTESTINAL ENDOSCOPY N/A 2018    EGD BIOPSY performed by Eun Padilla MD at [place] [address]. History:    Social History     Tobacco Use   Smoking Status Current Every Day Smoker    Packs/day: 0.50    Years: 54.00    Pack years: 27.00    Types: Cigarettes, Cigars    Start date: 2018    Last attempt to quit: 2020    Years since quittin.0   Smokeless Tobacco Former User    Types: Snuff   Tobacco Comment    less than 1/2 pk     Current Medications:  Current Facility-Administered Medications   Medication Dose Route Frequency Provider Last Rate Last Dose    lactated ringers infusion   Intravenous Continuous Theodore Frnaz MD        sodium chloride flush 0.9 % injection 10 mL  10 mL Intravenous 2 times per day Theodore Franz MD        sodium chloride flush 0.9 % injection 10 mL  10 mL Intravenous PRN Theodore Franz MD        lidocaine PF 1 % injection 0.3 mL  0.3 mL Intradermal Once PRN Theodore Franz MD        ceFAZolin (ANCEF) 2 g in dextrose 5 % 100 mL IVPB  2 g Intravenous On Call to Cat Maravilla MD        sodium chloride flush 0.9 % injection 10 mL  10 mL Intravenous 2 times per day Rosa Maria Fernando MD        sodium chloride flush 0.9 % injection 10 mL  10 mL Intravenous PRN Rosa Maria Fernando MD         Allergies:  Lisinopril; Ace inhibitors; Influenza vaccines; and Spiriva handihaler [tiotropium bromide monohydrate]    REVIEW OF SYSTEMS:   A 14 point review of systems was completed. Pertinent positives identified in the HPI, all other review of systems negative.       Objective:   PHYSICAL EXAM:        VITALS:  BP (!) 159/61   Pulse 79   Temp 98.2 °F (36.8 °C) (Temporal)   Resp 20   Ht 5' 7\" (1.702 m)   Wt 213 lb 4 oz (96.7 kg)   SpO2 97%   BMI 33.40 kg/m²   CONSTITUTIONAL: Cooperative, no apparent distress, and appears well nourished / developed  NEUROLOGIC:  Awake and oriented to person, place and time. PSYCH: Calm affect. SKIN: Warm and dry. HEENT: Sclera non-icteric, normocephalic, neck supple, normal carotid pulses with no bruits and thyroid normal size. LUNGS:  No increased work of breathing and clear to auscultation, no crackles or wheezing  CARDIOVASCULAR:  Regular rate and rhythm with no murmurs, gallops, rubs, or abnormal heart sounds, normal PMI. Pulses:    radial femoral DP PT   RIGHT 2 1 doppler doppler   LEFT 2 1 doppler doppler     ABDOMEN:  Normal bowel sounds, non-distended and non-tender to palpation  EXT: No edema, no calf tenderness. Rubor of both feet. Assessment:   Distal Aortic occlusion with ischemic rest pain. Plan: AortoBifemoral bypass. I have discussed all risks , (including but not limited to death, myocardial infarction, hemorrhage, infection, stroke, renal, mesenteric, lower extremity, infection, need for transfusion), benefits and alternatives of AortoBifemoral bypass with patient. The patient understands and freely consents. All questions and expectations addressed.           Renetta Salomon MD, FACS

## 2020-08-25 LAB
ANION GAP SERPL CALCULATED.3IONS-SCNC: 11 MMOL/L (ref 3–16)
ANION GAP SERPL CALCULATED.3IONS-SCNC: 12 MMOL/L (ref 3–16)
ANION GAP SERPL CALCULATED.3IONS-SCNC: 8 MMOL/L (ref 3–16)
ANION GAP SERPL CALCULATED.3IONS-SCNC: 8 MMOL/L (ref 3–16)
BUN BLDV-MCNC: 10 MG/DL (ref 7–20)
BUN BLDV-MCNC: 11 MG/DL (ref 7–20)
BUN BLDV-MCNC: 11 MG/DL (ref 7–20)
BUN BLDV-MCNC: 9 MG/DL (ref 7–20)
CALCIUM SERPL-MCNC: 8.5 MG/DL (ref 8.3–10.6)
CALCIUM SERPL-MCNC: 8.6 MG/DL (ref 8.3–10.6)
CALCIUM SERPL-MCNC: 8.8 MG/DL (ref 8.3–10.6)
CALCIUM SERPL-MCNC: 8.9 MG/DL (ref 8.3–10.6)
CHLORIDE BLD-SCNC: 101 MMOL/L (ref 99–110)
CHLORIDE BLD-SCNC: 102 MMOL/L (ref 99–110)
CHLORIDE BLD-SCNC: 98 MMOL/L (ref 99–110)
CHLORIDE BLD-SCNC: 99 MMOL/L (ref 99–110)
CO2: 26 MMOL/L (ref 21–32)
CO2: 27 MMOL/L (ref 21–32)
CO2: 27 MMOL/L (ref 21–32)
CO2: 29 MMOL/L (ref 21–32)
CREAT SERPL-MCNC: 0.7 MG/DL (ref 0.8–1.3)
CREAT SERPL-MCNC: 0.8 MG/DL (ref 0.8–1.3)
GFR AFRICAN AMERICAN: >60
GFR NON-AFRICAN AMERICAN: >60
GLUCOSE BLD-MCNC: 110 MG/DL (ref 70–99)
GLUCOSE BLD-MCNC: 114 MG/DL (ref 70–99)
GLUCOSE BLD-MCNC: 121 MG/DL (ref 70–99)
GLUCOSE BLD-MCNC: 133 MG/DL (ref 70–99)
GLUCOSE BLD-MCNC: 133 MG/DL (ref 70–99)
GLUCOSE BLD-MCNC: 144 MG/DL (ref 70–99)
HCT VFR BLD CALC: 28.2 % (ref 40.5–52.5)
HEMOGLOBIN: 8.8 G/DL (ref 13.5–17.5)
MCH RBC QN AUTO: 23.2 PG (ref 26–34)
MCHC RBC AUTO-ENTMCNC: 31.2 G/DL (ref 31–36)
MCV RBC AUTO: 74.5 FL (ref 80–100)
PDW BLD-RTO: 21.2 % (ref 12.4–15.4)
PERFORMED ON: ABNORMAL
PERFORMED ON: ABNORMAL
PLATELET # BLD: 293 K/UL (ref 135–450)
PMV BLD AUTO: 6.9 FL (ref 5–10.5)
POTASSIUM REFLEX MAGNESIUM: 4.2 MMOL/L (ref 3.5–5.1)
POTASSIUM REFLEX MAGNESIUM: 4.3 MMOL/L (ref 3.5–5.1)
POTASSIUM REFLEX MAGNESIUM: 4.4 MMOL/L (ref 3.5–5.1)
POTASSIUM REFLEX MAGNESIUM: 4.9 MMOL/L (ref 3.5–5.1)
RBC # BLD: 3.78 M/UL (ref 4.2–5.9)
SODIUM BLD-SCNC: 135 MMOL/L (ref 136–145)
SODIUM BLD-SCNC: 137 MMOL/L (ref 136–145)
SODIUM BLD-SCNC: 138 MMOL/L (ref 136–145)
SODIUM BLD-SCNC: 138 MMOL/L (ref 136–145)
WBC # BLD: 15 K/UL (ref 4–11)

## 2020-08-25 PROCEDURE — 2700000000 HC OXYGEN THERAPY PER DAY

## 2020-08-25 PROCEDURE — 37799 UNLISTED PX VASCULAR SURGERY: CPT

## 2020-08-25 PROCEDURE — 6360000002 HC RX W HCPCS: Performed by: SURGERY

## 2020-08-25 PROCEDURE — 2000000000 HC ICU R&B

## 2020-08-25 PROCEDURE — C9113 INJ PANTOPRAZOLE SODIUM, VIA: HCPCS | Performed by: SURGERY

## 2020-08-25 PROCEDURE — 80048 BASIC METABOLIC PNL TOTAL CA: CPT

## 2020-08-25 PROCEDURE — 6370000000 HC RX 637 (ALT 250 FOR IP): Performed by: SURGERY

## 2020-08-25 PROCEDURE — 2580000003 HC RX 258: Performed by: SURGERY

## 2020-08-25 PROCEDURE — 2500000003 HC RX 250 WO HCPCS: Performed by: SURGERY

## 2020-08-25 PROCEDURE — 94761 N-INVAS EAR/PLS OXIMETRY MLT: CPT

## 2020-08-25 PROCEDURE — 85027 COMPLETE CBC AUTOMATED: CPT

## 2020-08-25 PROCEDURE — 36415 COLL VENOUS BLD VENIPUNCTURE: CPT

## 2020-08-25 RX ORDER — ALBUTEROL SULFATE 2.5 MG/3ML
2.5 SOLUTION RESPIRATORY (INHALATION) 2 TIMES DAILY
Status: DISCONTINUED | OUTPATIENT
Start: 2020-08-25 | End: 2020-08-31 | Stop reason: HOSPADM

## 2020-08-25 RX ORDER — ALBUTEROL SULFATE 2.5 MG/3ML
2.5 SOLUTION RESPIRATORY (INHALATION) EVERY 6 HOURS PRN
Status: DISCONTINUED | OUTPATIENT
Start: 2020-08-25 | End: 2020-08-25

## 2020-08-25 RX ADMIN — ONDANSETRON 4 MG: 2 INJECTION INTRAMUSCULAR; INTRAVENOUS at 08:55

## 2020-08-25 RX ADMIN — METOPROLOL TARTRATE 5 MG: 5 INJECTION INTRAVENOUS at 00:58

## 2020-08-25 RX ADMIN — MORPHINE SULFATE 4 MG: 4 INJECTION, SOLUTION INTRAMUSCULAR; INTRAVENOUS at 06:01

## 2020-08-25 RX ADMIN — Medication 10 ML: at 20:31

## 2020-08-25 RX ADMIN — PROMETHAZINE HYDROCHLORIDE 12.5 MG: 25 TABLET ORAL at 18:05

## 2020-08-25 RX ADMIN — PANTOPRAZOLE SODIUM 40 MG: 40 INJECTION, POWDER, FOR SOLUTION INTRAVENOUS at 09:58

## 2020-08-25 RX ADMIN — MUPIROCIN: 20 OINTMENT TOPICAL at 20:31

## 2020-08-25 RX ADMIN — MORPHINE SULFATE 4 MG: 4 INJECTION, SOLUTION INTRAMUSCULAR; INTRAVENOUS at 09:58

## 2020-08-25 RX ADMIN — MORPHINE SULFATE 4 MG: 4 INJECTION, SOLUTION INTRAMUSCULAR; INTRAVENOUS at 21:51

## 2020-08-25 RX ADMIN — SODIUM CHLORIDE: 9 INJECTION, SOLUTION INTRAVENOUS at 02:18

## 2020-08-25 RX ADMIN — MORPHINE SULFATE 4 MG: 4 INJECTION, SOLUTION INTRAMUSCULAR; INTRAVENOUS at 18:05

## 2020-08-25 RX ADMIN — METOPROLOL TARTRATE 5 MG: 5 INJECTION INTRAVENOUS at 06:33

## 2020-08-25 RX ADMIN — MORPHINE SULFATE 2 MG: 2 INJECTION, SOLUTION INTRAMUSCULAR; INTRAVENOUS at 04:32

## 2020-08-25 RX ADMIN — METOPROLOL TARTRATE 5 MG: 5 INJECTION INTRAVENOUS at 11:46

## 2020-08-25 RX ADMIN — ONDANSETRON 4 MG: 2 INJECTION INTRAMUSCULAR; INTRAVENOUS at 15:29

## 2020-08-25 RX ADMIN — Medication 10 ML: at 09:58

## 2020-08-25 RX ADMIN — MORPHINE SULFATE 4 MG: 4 INJECTION, SOLUTION INTRAMUSCULAR; INTRAVENOUS at 11:46

## 2020-08-25 RX ADMIN — MUPIROCIN: 20 OINTMENT TOPICAL at 18:08

## 2020-08-25 RX ADMIN — METOPROLOL TARTRATE 5 MG: 5 INJECTION INTRAVENOUS at 18:05

## 2020-08-25 RX ADMIN — MORPHINE SULFATE 4 MG: 4 INJECTION, SOLUTION INTRAMUSCULAR; INTRAVENOUS at 15:29

## 2020-08-25 ASSESSMENT — PAIN SCALES - GENERAL
PAINLEVEL_OUTOF10: 7
PAINLEVEL_OUTOF10: 7
PAINLEVEL_OUTOF10: 6
PAINLEVEL_OUTOF10: 5
PAINLEVEL_OUTOF10: 10
PAINLEVEL_OUTOF10: 8
PAINLEVEL_OUTOF10: 10
PAINLEVEL_OUTOF10: 6
PAINLEVEL_OUTOF10: 10

## 2020-08-25 ASSESSMENT — PAIN DESCRIPTION - PROGRESSION
CLINICAL_PROGRESSION: NOT CHANGED
CLINICAL_PROGRESSION: NOT CHANGED

## 2020-08-25 ASSESSMENT — PAIN DESCRIPTION - DESCRIPTORS
DESCRIPTORS: ACHING
DESCRIPTORS: ACHING

## 2020-08-25 ASSESSMENT — PAIN - FUNCTIONAL ASSESSMENT
PAIN_FUNCTIONAL_ASSESSMENT: PREVENTS OR INTERFERES SOME ACTIVE ACTIVITIES AND ADLS
PAIN_FUNCTIONAL_ASSESSMENT: PREVENTS OR INTERFERES SOME ACTIVE ACTIVITIES AND ADLS

## 2020-08-25 ASSESSMENT — PAIN DESCRIPTION - FREQUENCY
FREQUENCY: CONTINUOUS
FREQUENCY: CONTINUOUS

## 2020-08-25 ASSESSMENT — PAIN DESCRIPTION - LOCATION
LOCATION: ABDOMEN
LOCATION: ABDOMEN

## 2020-08-25 ASSESSMENT — PAIN DESCRIPTION - ORIENTATION
ORIENTATION: MID
ORIENTATION: MID

## 2020-08-25 ASSESSMENT — PAIN DESCRIPTION - ONSET
ONSET: ON-GOING
ONSET: ON-GOING

## 2020-08-25 ASSESSMENT — PAIN DESCRIPTION - PAIN TYPE
TYPE: SURGICAL PAIN
TYPE: SURGICAL PAIN

## 2020-08-25 NOTE — PROGRESS NOTES
IS provided. Pt educated on use and purpose. Pt aware of need to use IS q1H x10 . Pt able to demonstrate successful use. IS x 10 to 2,000. Pt tolerated well.

## 2020-08-25 NOTE — PLAN OF CARE
Problem: Pain:  Goal: Pain level will decrease  Description: Pain level will decrease  Outcome: Ongoing     Problem: Pain:  Goal: Control of acute pain  Description: Control of acute pain  Outcome: Ongoing     Problem: Skin Integrity:  Goal: Will show no infection signs and symptoms  Description: Will show no infection signs and symptoms  Outcome: Ongoing     Problem: Skin Integrity:  Goal: Absence of new skin breakdown  Description: Absence of new skin breakdown  Outcome: Ongoing     Problem: Bleeding:  Goal: Will show no signs and symptoms of excessive bleeding  Description: Will show no signs and symptoms of excessive bleeding  Outcome: Ongoing     Problem: Tissue Perfusion - Cardiopulmonary, Altered:  Goal: Hemodynamic stability will improve  Description: Hemodynamic stability will improve  Outcome: Ongoing     Problem: Tissue Perfusion - Cardiopulmonary, Altered:  Goal: Will show no evidence of cardiac arrhythmias  Description: Will show no evidence of cardiac arrhythmias  Outcome: Ongoing     Problem: Serum Glucose Level - Abnormal:  Goal: Ability to maintain appropriate glucose levels will improve  Description: Ability to maintain appropriate glucose levels will improve  Outcome: Ongoing

## 2020-08-25 NOTE — PROGRESS NOTES
Assessment complete. Pt resting quietly in bed. A&Ox4. No s/s distress. Tele ST. Pt c/o pain abd pain 7-10. Morphine per PRN orders given. Abd drsg intact with old drainage noted. BL groin CHAYO vac CDI and functioning. Castrejon secure and draining. NG intact to LCWS. Small amount bile noted. Denies further needs. Call light in reach.

## 2020-08-25 NOTE — OP NOTE
[facility]                                OPERATIVE REPORT    PATIENT NAME: Ananya Leon                    :        1955  MED REC NO:   [id number]                          ROOM:       8575  ACCOUNT NO:   [de-identified]                           ADMIT DATE: 2020  PROVIDER:     Kar Rachel MD    DATE OF PROCEDURE:  2020    PREOPERATIVE DIAGNOSES:  Peripheral vascular disease with ischemic rest  pain of the lower extremities secondary to aortoiliac occlusive disease. POSTOPERATIVE DIAGNOSES:  Peripheral vascular disease with ischemic rest  pain of the lower extremities secondary to aortoiliac occlusive disease. PROCEDURE:  Aortobifemoral bypass. ANESTHESIA:  General endotracheal.    INDICATIONS:  The patient is a 60-year-old male with history of  disabling claudication, which has advanced to include ischemic rest pain  symptoms. Angiography had been performed showing occlusion of the  distal aorta and bilateral common iliac artery. There was calcified  plaque throughout the vessel. The patient is brought to the operating  room to undergo aortobifemoral bypass. PROCEDURE:  The patient is brought to the operating room, placed in the  supine position. General endotracheal anesthesia induced. After  placement of appropriate monitoring lines by the anesthesiologist, the  abdomen and pelvic regions were prepped and draped in sterile fashion. Bilateral groin incisions were made exposing the common femoral arteries  from the level of the inguinal ligament to the femoral bifurcations. There was posterior plaque in both the arteries, but soft anteriorly. Next, the incision was made in the abdomen in the midline and the fascia  was incised. The abdomen was entered. There were some changes of  cirrhosis noted. No ascites was noted however.   A nasogastric tube was  palpated and positioned appropriately in the stomach. The small bowel  was retracted to the right side of the abdomen, and the duodenum was  mobilized off the aorta and the retroperitoneal tissue overlying the  aorta was incised. The aorta was calcified. However, there were  several areas below the renal arteries that were soft and suitable for  clamping. The patient had a large patent inferior mesenteric artery. Thus, I elected to perform an end-to-side anastomosis rather than  end-to-end to preserve the patency of the inferior mesenteric artery. The aorta was mobilized slightly just below the renal arteries and then  just above the inferior mesenteric artery. With medial and lateral  retraction of the aorta, a pair of large lumbar vessels that had been  visualized on the preoperative angiogram were identified and ligated  using hemoclips. Retroperitoneal tunnels were created from the aortic  exposure to both groins. These were then marked with umbilical tapes. The patient was given 5000 units of intravenous heparin. After  approximately 3 minutes, the aorta was clamped below the renal arteries  and also clamped above the inferior mesenteric artery. A longitudinal  aortotomy was made. There was some plaque that was removed as well as  some thrombotic material that was removed from the aorta, and that base  was then flushed with heparinized saline solution. A 14 mm x 7 mm  bifurcated Dacron graft was taken, main body was cut down to length, and  at an appropriate angle. It was sewn in end-to-side fashion to the  aorta using running 3-0 Prolene suture. After completing the  anastomosis, the distal clamp was removed first, backbleeding through  the open end of the graft. The graft was then clamped and the proximal  clamp was slowly released. Blood was flushed out through the graft  again, and then the graft was reclamped.   There were a few small  bleeding points along the anastomosis, which were controlled over the midline abdominal incision. There were no  complications to this procedure. At the end of procedure, sponge,  needle, and instrument counts were correct. Total blood loss was  approximately 350 mL, of which 125 mL was given back through the Cell  Saver system. At the conclusion of procedure, the patient was extubated  in the operating room and transferred to the intensive care unit in  stable condition.       Hong Wang MD    D: 08/24/2020 15:14:46       T: 08/24/2020 15:18:59     [email or web address]  Job#: [id number]     Doc#: [id number]    CC:

## 2020-08-25 NOTE — PROGRESS NOTES
Order received for arterial line removal.  R radial arterial line discontinued. Manual pressure held for 5 minutes and tegaderm on site. No hematoma, no oozing noted. Patient tolerated well.

## 2020-08-25 NOTE — PROGRESS NOTES
4 Eyes Skin Assessment     The patient is being assess for   Shift Handoff    I agree that 2 RN's have performed a thorough Head to Toe Skin Assessment on the patient. ALL assessment sites listed below have been assessed. Areas assessed by both nurses:   [x]   Head, Face, and Ears   [x]   Shoulders, Back, and Chest, Abdomen  [x]   Arms, Elbows, and Hands   [x]   Coccyx, Sacrum, and Ischium  [x]   Legs, Feet, and Heels     Surgical incision midline abd, bilateral groin       Co-signer eSignature: Electronically signed by Shereen Weinstein RN on 8/25/20 at 6:55 PM EDT    Does the Patient have Skin Breakdown?   No          Abraham Prevention initiated:  Yes   Wound Care Orders initiated:  NA      WOC nurse consulted for Pressure Injury (Stage 3,4, Unstageable, DTI, NWPT, Complex wounds)and New or Established Ostomies:  NA      Primary Nurse eSignature: Electronically signed by Hemant Vincent RN on 8/25/20 at 7:33 AM EDT

## 2020-08-25 NOTE — PROGRESS NOTES
Hospital Day: 2  POD#  1  S/P AortoBifemoral bypass       Mr. Georges Habermann   reports no complaints. Physical Exam:  Temp BP Pulse Resp Rate SpO2 FIO2 O2 Rate   Temp: 98 °F (36.7 °C) BP: (!) 147/67 Pulse: 97   SpO2: 99 %       Vitals:  Temp  Av.3 °F (36.3 °C)  Min: 97.1 °F (36.2 °C)  Max: 98 °F (36.7 °C)           Intake/Output Summary (Last 24 hours) at 2020 0727  Last data filed at 2020 0034  Gross per 24 hour   Intake 5208 ml   Output 2570 ml   Net 2638 ml        UO:  1000 ml / 8 hours      Pre-op Weight:   Weight: 210 lb (95.3 kg)     Lungs: coarse with scattered wheezes  Heart: regular rate and rhythm, S1, S2 normal, no murmur, click, rub or gallop  Abdomen: soft, non-tender; bowel sounds normal; no masses,  no organomegaly and soft, incisional tenderness, dressing dry, BS hypo  Extremities: extremities normal, atraumatic, no cyanosis or edema, palp PT pulses  Neurologic: Grossly normal    Data Review:   Chemistry:  Recent Labs     20  1907 20  0019 20  0553    135* 137   K 5.0 4.9 4.4   CO2 28 26 27   BUN 8 9 10   CREATININE 0.9 0.7* 0.7*      . lastb  CBC:  Recent Labs     20  0600 20  1155 20  0553   WBC 10.3 13.3* 15.0*   HGB 10.1* 8.9* 8.8*   HCT 32.5* 28.4* 28.2*   MCV 74.5* 75.1* 74.5*    319 293     Cardiac markers:   Lab Results   Component Value Date    TROPONINI <0.01 2020      Coags:  Lab Results   Component Value Date    INR 1.03 2020    INR 1.07 2019    INR 1.11 2019    PROTIME 12.0 2020    PROTIME 12.2 2019    PROTIME 12.6 2019           Patient Active Problem List    Diagnosis Date Noted    Peripheral vascular disease, unspecified ([facility] 2020    Other constipation     Essential hypertension     Hypokalemia     MICHAEL (acute kidney injury) ([facility]     PVD (peripheral vascular disease) ([facility]     Hypercalcemia 2020    Severe claudication ([facility] 2020    Uncontrolled hypertension 11/12/2019    AVM (arteriovenous malformation) of small bowel, acquired 03/08/2019    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 ([facility]     Iron deficiency anemia     Rectal bleeding     Polyp of colon     Diverticulosis of intestine without bleeding     Secondary esophageal varices without bleeding (HCC)     Erosive gastritis     Multiple duodenal ulcers     Alcoholic cirrhosis of liver without ascites ([address] 07/12/2018    Family history of rectal cancer 07/12/2018    Onychomycosis 02/23/2018    Tinea pedis of both feet 02/23/2018    Chronic idiopathic constipation 02/23/2018    Bronchiectasis without complication ([address]     Diabetic ketoacidosis without coma associated with type 2 diabetes mellitus ([address] 02/18/2018    Insulin dependent type 2 diabetes mellitus, uncontrolled ([address] 02/18/2018    Mixed hyperlipidemia 03/05/2017    Bladder outlet obstruction 03/05/2017    Malignant neoplasm of urinary bladder (HCC) 02/09/2016    Mixed simple and mucopurulent chronic bronchitis ([facility] 02/09/2016    Ulnar nerve entrapment 02/09/2016    Chronic pain 08/04/2014    Hepatomegaly 04/25/2014    Chronic alcoholic hepatitis 04/90/3305    Ulnar neuropathy at elbow 04/25/2014    Alcohol abuse 11/18/2013    Hyponatremia 11/18/2013    Osteoarthritis of multiple joints 04/02/2013    Supraclavicular fossa fullness 04/02/2013    Carpal tunnel syndrome of left wrist 04/02/2013    Gout 02/01/2013    Paresthesia of bilateral legs 02/01/2013    Right knee pain 02/01/2013    Numbness and tingling of right leg 02/01/2013    Tobacco abuse 01/07/2013    GERD (gastroesophageal reflux disease) 01/07/2013    Chest pain 01/07/2013    Benign essential HTN     Hilar adenopathy        Assessment/Plan:    POD #1 ABF bypass, graft patent with good perfusion     CV:  Stable, continue beta blocker   Resp.:  On 2L, nebulizers ordered prn   Renal:  Creat at baseline, good UO, will decrease IVF, continue Castrejon for monitoring       Argelia Ivy  8/25/2020  7:27 AM

## 2020-08-25 NOTE — PROGRESS NOTES
RESPIRATORY THERAPY ASSESSMENT    Name:  Trevon Garcia Record Number:  [id number]  Age: 62 y.o. Gender: male  : 1955  Today's Date:  2020  Room:  0258/0258-01    Assessment     Is the patient being admitted for a COPD or Asthma exacerbation? No   (If yes the patient will be seen every 4 hours for the first 24 hours and then reassessed)    Patient Admission Diagnosis      Allergies  Allergies   Allergen Reactions    Lisinopril Swelling    Ace Inhibitors Swelling    Influenza Vaccines      He states he was hospitalized when he received his first flu vaccine    Spiriva Handihaler [Tiotropium Bromide Monohydrate] Swelling     Tolerates both tudorza and incruse       Minimum Predicted Vital Capacity:     NA          Actual Vital Capacity:      NA              Pulmonary History:COPD  Home Oxygen Therapy: 2 lpm prn    Home Respiratory Therapy:Albuterol, Budesonide and Umeclidinium Bromide   Current Respiratory Therapy:  Albuterol nebs Q6H prn          Respiratory Severity Index(RSI)   Patients with orders for inhalation medications, oxygen, or any therapeutic treatment modality will be placed on Respiratory Protocol. They will be assessed with the first treatment and at least every 72 hours thereafter. The following severity scale will be used to determine frequency of treatment intervention.     Smoking History: Pulmonary Disease or Smoking History, Greater than 15 pack year = 2    Social History  Social History     Tobacco Use    Smoking status: Current Every Day Smoker     Packs/day: 0.50     Years: 54.00     Pack years: 27.00     Types: Cigarettes, Cigars     Start date: 2018     Last attempt to quit: 2020     Years since quittin.0    Smokeless tobacco: Former User     Types: Snuff    Tobacco comment: less than 1/2 pk   Substance Use Topics    Alcohol use: Not Currently     Alcohol/week: 2.0 standard drinks     Types: 2 Cans of beer per week     Frequency: Monthly or educated on the proper method of use for Respiratory Care Devices:  Yes      Level of patient/caregiver understanding able to:   ? Verbalize understanding   ? Demonstrate understanding       ? Teach back        ? Needs reinforcement       ? No available caregiver               ? Other:     Response to education:  Good     Is patient being placed on Home Treatment Regimen? Yes     Does the patient have everything they need prior to discharge? NA     Comments: chart reviewed and pt assessed    Plan of Care: Albuterol nebs BID    Electronically signed by Memo Moreno RCP on 8/25/2020 at 11:39 AM    Respiratory Protocol Guidelines     1. Assessment and treatment by Respiratory Therapy will be initiated for medication and therapeutic interventions upon initiation of aerosolized medication. 2. Physician will be contacted for respiratory rate (RR) greater than 35 breaths per minute. Therapy will be held for heart rate (HR) greater than 140 beats per minute, pending direction from physician. 3. Bronchodilators will be administered via Metered Dose Inhaler (MDI) with spacer when the following criteria are met:  a. Alert and cooperative     b. HR < 140 bpm  c. RR < 30 bpm                d. Can demonstrate a 2-3 second inspiratory hold  4. Bronchodilators will be administered via Hand Held Nebulizer [facility]) to patients when ANY of the following criteria are met  a. Incognizant or uncooperative          b. Patients treated with HHN at Home        c. Unable to demonstrate proper use of MDI with spacer     d. RR > 30 bpm   5. Bronchodilators will be delivered via Metered Dose Inhaler (MDI), HHN, Aerogen to intubated patients on mechanical ventilation. 6. Inhalation medication orders will be delivered and/or substituted as outlined below. Aerosolized Medications Ordering and Administration Guidelines:    1.  All Medications will be ordered by a physician, and their frequency and/or modality will be adjusted as defined by the patients Respiratory Severity Index (RSI) score. 2. If the patient does not have documented COPD, consider discontinuing anticholinergics when RSI is less than 9.  3. If the bronchospasm worsens (increased RSI), then the bronchodilator frequency can be increased to a maximum of every 4 hours. If greater than every 4 hours is required, the physician will be contacted. 4. If the bronchospasm improves, the frequency of the bronchodilator can be decreased, based on the patient's RSI, but not less than home treatment regimen frequency. 5. Bronchodilator(s) will be discontinued if patient has a RSI less than 9 and has received no scheduled or as needed treatment for 72  Hrs. Patients Ordered on a Mucolytic Agent:    1. Must always be administered with a bronchodilator. 2. Discontinue if patient experiences worsened bronchospasm, or secretions have lessened to the point that the patient is able to clear them with a cough. Anti-inflammatory and Combination Medications:    1. If the patient lacks prior history of lung disease, is not using inhaled anti-inflammatory medication at home, and lacks wheezing by examination or by history for at least 24 hours, contact physician for possible discontinuation.

## 2020-08-25 NOTE — PROGRESS NOTES
4 Eyes Skin Assessment     The patient is being assess for   Shift Handoff    I agree that 2 RN's have performed a thorough Head to Toe Skin Assessment on the patient. ALL assessment sites listed below have been assessed. Areas assessed by both nurses:   [x]   Head, Face, and Ears   [x]   Shoulders, Back, and Chest, Abdomen  [x]   Arms, Elbows, and Hands   [x]   Coccyx, Sacrum, and Ischium  []   Legs, Feet, and Heels           **SHARE this note so that the co-signing nurse is able to place an eSignature**    Co-signer eSignature: Electronically signed by Joseph Díaz RN on 8/25/20 at 10:16 PM EDT    Does the Patient have Skin Breakdown?   Yes LDA WOUND CARE was Initiated documentation include the Giana-wound, Wound Assessment, Measurements, Dressing Treatment, Drainage, and Color\",  Pt with post op surgical wounds       Abraham Prevention initiated:  Yes   Wound Care Orders initiated:  NA      WOC nurse consulted for Pressure Injury (Stage 3,4, Unstageable, DTI, NWPT, Complex wounds)and New or Established Ostomies:  NA      Primary Nurse eSignature: Electronically signed by Meka Krishnamurthy RN on 8/25/20 at 6:55 PM EDT

## 2020-08-25 NOTE — PROGRESS NOTES
Reassessment complete- see doc flow sheet. Pt resting with eyes closed, respirations even and unlabored. VSS, afebrile. Will continue to monitor.

## 2020-08-26 LAB
ANION GAP SERPL CALCULATED.3IONS-SCNC: 10 MMOL/L (ref 3–16)
ANION GAP SERPL CALCULATED.3IONS-SCNC: 12 MMOL/L (ref 3–16)
BUN BLDV-MCNC: 11 MG/DL (ref 7–20)
BUN BLDV-MCNC: 11 MG/DL (ref 7–20)
CALCIUM SERPL-MCNC: 8.9 MG/DL (ref 8.3–10.6)
CALCIUM SERPL-MCNC: 8.9 MG/DL (ref 8.3–10.6)
CHLORIDE BLD-SCNC: 100 MMOL/L (ref 99–110)
CHLORIDE BLD-SCNC: 100 MMOL/L (ref 99–110)
CO2: 27 MMOL/L (ref 21–32)
CO2: 28 MMOL/L (ref 21–32)
CREAT SERPL-MCNC: 0.7 MG/DL (ref 0.8–1.3)
CREAT SERPL-MCNC: 0.7 MG/DL (ref 0.8–1.3)
GFR AFRICAN AMERICAN: >60
GFR AFRICAN AMERICAN: >60
GFR NON-AFRICAN AMERICAN: >60
GFR NON-AFRICAN AMERICAN: >60
GLUCOSE BLD-MCNC: 106 MG/DL (ref 70–99)
GLUCOSE BLD-MCNC: 109 MG/DL (ref 70–99)
GLUCOSE BLD-MCNC: 115 MG/DL (ref 70–99)
GLUCOSE BLD-MCNC: 119 MG/DL (ref 70–99)
GLUCOSE BLD-MCNC: 124 MG/DL (ref 70–99)
GLUCOSE BLD-MCNC: 132 MG/DL (ref 70–99)
HCT VFR BLD CALC: 30.6 % (ref 40.5–52.5)
HEMOGLOBIN: 9.3 G/DL (ref 13.5–17.5)
MCH RBC QN AUTO: 23.2 PG (ref 26–34)
MCHC RBC AUTO-ENTMCNC: 30.6 G/DL (ref 31–36)
MCV RBC AUTO: 75.9 FL (ref 80–100)
PDW BLD-RTO: 21.3 % (ref 12.4–15.4)
PERFORMED ON: ABNORMAL
PLATELET # BLD: 290 K/UL (ref 135–450)
PMV BLD AUTO: 6.8 FL (ref 5–10.5)
POTASSIUM REFLEX MAGNESIUM: 3.8 MMOL/L (ref 3.5–5.1)
POTASSIUM REFLEX MAGNESIUM: 4.2 MMOL/L (ref 3.5–5.1)
RBC # BLD: 4.03 M/UL (ref 4.2–5.9)
SODIUM BLD-SCNC: 138 MMOL/L (ref 136–145)
SODIUM BLD-SCNC: 139 MMOL/L (ref 136–145)
WBC # BLD: 11.1 K/UL (ref 4–11)

## 2020-08-26 PROCEDURE — 51798 US URINE CAPACITY MEASURE: CPT

## 2020-08-26 PROCEDURE — 2500000003 HC RX 250 WO HCPCS: Performed by: SURGERY

## 2020-08-26 PROCEDURE — 51701 INSERT BLADDER CATHETER: CPT

## 2020-08-26 PROCEDURE — 6360000002 HC RX W HCPCS: Performed by: SURGERY

## 2020-08-26 PROCEDURE — 97530 THERAPEUTIC ACTIVITIES: CPT

## 2020-08-26 PROCEDURE — 6370000000 HC RX 637 (ALT 250 FOR IP): Performed by: SURGERY

## 2020-08-26 PROCEDURE — 94761 N-INVAS EAR/PLS OXIMETRY MLT: CPT

## 2020-08-26 PROCEDURE — 97116 GAIT TRAINING THERAPY: CPT

## 2020-08-26 PROCEDURE — C9113 INJ PANTOPRAZOLE SODIUM, VIA: HCPCS | Performed by: SURGERY

## 2020-08-26 PROCEDURE — 85027 COMPLETE CBC AUTOMATED: CPT

## 2020-08-26 PROCEDURE — 36415 COLL VENOUS BLD VENIPUNCTURE: CPT

## 2020-08-26 PROCEDURE — 94640 AIRWAY INHALATION TREATMENT: CPT

## 2020-08-26 PROCEDURE — 80048 BASIC METABOLIC PNL TOTAL CA: CPT

## 2020-08-26 PROCEDURE — 97162 PT EVAL MOD COMPLEX 30 MIN: CPT

## 2020-08-26 PROCEDURE — 97166 OT EVAL MOD COMPLEX 45 MIN: CPT

## 2020-08-26 PROCEDURE — 2700000000 HC OXYGEN THERAPY PER DAY

## 2020-08-26 PROCEDURE — 2000000000 HC ICU R&B

## 2020-08-26 PROCEDURE — 2580000003 HC RX 258: Performed by: SURGERY

## 2020-08-26 RX ORDER — BISACODYL 10 MG
10 SUPPOSITORY, RECTAL RECTAL ONCE
Status: COMPLETED | OUTPATIENT
Start: 2020-08-26 | End: 2020-08-26

## 2020-08-26 RX ORDER — DILTIAZEM HYDROCHLORIDE 120 MG/1
360 CAPSULE, COATED, EXTENDED RELEASE ORAL DAILY
Status: DISCONTINUED | OUTPATIENT
Start: 2020-08-26 | End: 2020-08-31 | Stop reason: HOSPADM

## 2020-08-26 RX ADMIN — DILTIAZEM HYDROCHLORIDE 360 MG: 120 CAPSULE, COATED, EXTENDED RELEASE ORAL at 08:18

## 2020-08-26 RX ADMIN — MUPIROCIN: 20 OINTMENT TOPICAL at 08:03

## 2020-08-26 RX ADMIN — MORPHINE SULFATE 4 MG: 4 INJECTION, SOLUTION INTRAMUSCULAR; INTRAVENOUS at 13:08

## 2020-08-26 RX ADMIN — SODIUM CHLORIDE: 9 INJECTION, SOLUTION INTRAVENOUS at 21:53

## 2020-08-26 RX ADMIN — BISACODYL 10 MG: 10 SUPPOSITORY RECTAL at 08:18

## 2020-08-26 RX ADMIN — METOPROLOL TARTRATE 5 MG: 5 INJECTION INTRAVENOUS at 06:19

## 2020-08-26 RX ADMIN — HYDRALAZINE HYDROCHLORIDE 10 MG: 20 INJECTION INTRAMUSCULAR; INTRAVENOUS at 05:25

## 2020-08-26 RX ADMIN — Medication 10 ML: at 21:55

## 2020-08-26 RX ADMIN — METOPROLOL TARTRATE 5 MG: 5 INJECTION INTRAVENOUS at 18:07

## 2020-08-26 RX ADMIN — SODIUM CHLORIDE: 9 INJECTION, SOLUTION INTRAVENOUS at 04:17

## 2020-08-26 RX ADMIN — Medication 10 ML: at 08:02

## 2020-08-26 RX ADMIN — MORPHINE SULFATE 4 MG: 4 INJECTION, SOLUTION INTRAMUSCULAR; INTRAVENOUS at 18:07

## 2020-08-26 RX ADMIN — MORPHINE SULFATE 4 MG: 4 INJECTION, SOLUTION INTRAMUSCULAR; INTRAVENOUS at 22:03

## 2020-08-26 RX ADMIN — MORPHINE SULFATE 4 MG: 4 INJECTION, SOLUTION INTRAMUSCULAR; INTRAVENOUS at 02:13

## 2020-08-26 RX ADMIN — ALBUTEROL SULFATE 2.5 MG: 2.5 SOLUTION RESPIRATORY (INHALATION) at 08:16

## 2020-08-26 RX ADMIN — PANTOPRAZOLE SODIUM 40 MG: 40 INJECTION, POWDER, FOR SOLUTION INTRAVENOUS at 08:02

## 2020-08-26 RX ADMIN — MORPHINE SULFATE 4 MG: 4 INJECTION, SOLUTION INTRAMUSCULAR; INTRAVENOUS at 09:55

## 2020-08-26 RX ADMIN — METOPROLOL TARTRATE 5 MG: 5 INJECTION INTRAVENOUS at 13:08

## 2020-08-26 RX ADMIN — ALBUTEROL SULFATE 2.5 MG: 2.5 SOLUTION RESPIRATORY (INHALATION) at 19:53

## 2020-08-26 RX ADMIN — MUPIROCIN: 20 OINTMENT TOPICAL at 21:53

## 2020-08-26 RX ADMIN — MORPHINE SULFATE 2 MG: 2 INJECTION, SOLUTION INTRAMUSCULAR; INTRAVENOUS at 06:18

## 2020-08-26 RX ADMIN — METOPROLOL TARTRATE 5 MG: 5 INJECTION INTRAVENOUS at 00:35

## 2020-08-26 ASSESSMENT — PAIN SCALES - GENERAL
PAINLEVEL_OUTOF10: 7
PAINLEVEL_OUTOF10: 9
PAINLEVEL_OUTOF10: 7
PAINLEVEL_OUTOF10: 8
PAINLEVEL_OUTOF10: 10
PAINLEVEL_OUTOF10: 7
PAINLEVEL_OUTOF10: 7
PAINLEVEL_OUTOF10: 6

## 2020-08-26 ASSESSMENT — PAIN - FUNCTIONAL ASSESSMENT: PAIN_FUNCTIONAL_ASSESSMENT: PREVENTS OR INTERFERES SOME ACTIVE ACTIVITIES AND ADLS

## 2020-08-26 ASSESSMENT — PAIN DESCRIPTION - PAIN TYPE: TYPE: SURGICAL PAIN

## 2020-08-26 ASSESSMENT — PAIN DESCRIPTION - DESCRIPTORS: DESCRIPTORS: ACHING

## 2020-08-26 ASSESSMENT — PAIN DESCRIPTION - FREQUENCY: FREQUENCY: CONTINUOUS

## 2020-08-26 ASSESSMENT — PAIN SCALES - WONG BAKER: WONGBAKER_NUMERICALRESPONSE: 6

## 2020-08-26 ASSESSMENT — PAIN DESCRIPTION - LOCATION: LOCATION: ABDOMEN

## 2020-08-26 ASSESSMENT — PAIN DESCRIPTION - PROGRESSION: CLINICAL_PROGRESSION: NOT CHANGED

## 2020-08-26 ASSESSMENT — PAIN DESCRIPTION - ORIENTATION: ORIENTATION: MID

## 2020-08-26 ASSESSMENT — PAIN DESCRIPTION - ONSET: ONSET: ON-GOING

## 2020-08-26 NOTE — PROGRESS NOTES
NG clamping trial complete. Pt continues to report nausea w/o emesis. NG unclamped. No residual output noted. NG left in place d/t continued c/o nausea. Diet not advanced.

## 2020-08-26 NOTE — PROGRESS NOTES
Vascular Surgery Progress Note      SUBJECTIVE:  Incisional pain. OBJECTIVE    Physical  CURRENT VITALS:  BP (!) 169/82   Pulse 104   Temp 98.7 °F (37.1 °C) (Bladder)   Resp 15   Ht 5' 7\" (1.702 m)   Wt 208 lb 15.9 oz (94.8 kg)   SpO2 98%   BMI 32.73 kg/m²   24 HR INTAKE/OUTPUT:    Intake/Output Summary (Last 24 hours) at 8/26/2020 0738  Last data filed at 8/26/2020 0649  Gross per 24 hour   Intake 1263 ml   Output 2700 ml   Net -1437 ml     Abd soft.   Dressings dry  Bowel sounds +  Ext warm with palp pulses    Data  CBC:   Lab Results   Component Value Date    WBC 11.1 08/26/2020    RBC 4.03 08/26/2020    HGB 9.3 08/26/2020    HCT 30.6 08/26/2020    MCV 75.9 08/26/2020    MCH 23.2 08/26/2020    MCHC 30.6 08/26/2020    RDW 21.3 08/26/2020     08/26/2020    MPV 6.8 08/26/2020     BMP:    Lab Results   Component Value Date     08/26/2020    K 3.8 08/26/2020     08/26/2020    CO2 27 08/26/2020    BUN 11 08/26/2020    LABALBU 4.4 08/03/2020    CREATININE 0.7 08/26/2020    CALCIUM 8.9 08/26/2020    GFRAA >60 08/26/2020    GFRAA >60 04/02/2013    LABGLOM >60 08/26/2020    GLUCOSE 106 08/26/2020         ASSESSMENT AND PLAN    POD #2 ABF   NG Clamp trial   Ambulate   Restart po Diltiazem   Castrejon out

## 2020-08-26 NOTE — CARE COORDINATION
Chart review completed. Patient a 62year old male, admitted for PVD. Hospital day 2, currently admitted in ICU level of care. Upon initial assessment pt denies needs stating he was IPTA. Writer notes a need for [facility] at last admission and pt declined. Will follow for needs. Will need therapy evaluations.  Jackelin Roy RN

## 2020-08-26 NOTE — PROGRESS NOTES
Pt unable to void s/p chavira removal despite several attempts to use urinal. Pt reports feeling as if he has to urinate but can't . Bladder scan >500 retained urine. Straight cath done. 700ml clear yellow urine returned per straight cath. Will monitor.

## 2020-08-26 NOTE — PROGRESS NOTES
Minutes: 26 Minutes     This note to serve as discharge summary should pt discharge prior to next session.     Wandra Fleischer, OTR/L

## 2020-08-26 NOTE — PROGRESS NOTES
Pt continually complaining about being up in chair. Pt telling writer \"I have stage 4 liver cirrhosis, I cant sit here like this. \" Writer questioned patient as to why he could not sit up because of this. Pt w/o answer. Pt extremely reluctant to be OOB to chair and repeatedly asking to go back to bed with various c/o as to why he needs to go back to bed. Pt also states \"Well I see everyone else walking back here, but I havent\". Writer again reinforced importance of activity and how patient thought he could tolerate walking if he could not even sit OOB for 2hr. Writer again reinforced the importance of progressive activity and ambulation. Pt provided Morphine 4mg IV for c/o pain.

## 2020-08-26 NOTE — PROGRESS NOTES
Physical Therapy    Facility/Department: [facility] B2 - ICU  Initial Assessment and Treatment    NAME: Ruth Chew  : 1955  MRN: [id number]    Date of Service: 2020    Discharge Recommendations:  24 hour supervision or assist, Home with Home health PT   PT Equipment Recommendations  Equipment Needed: (CTA)    Assessment   Body structures, Functions, Activity limitations: Decreased functional mobility ; Decreased strength;Decreased safe awareness;Decreased endurance; Increased pain  Assessment: Pt is a 61 y/o male presenting POD #2 ABF bypass, graft patent with good perfusion on 20. PTA, pt was IND with all functional mobility. Pt currently requries Mod A for bed mobility and CGA for ambulation up to 50 feet. Pt will benefit from skilled PT to address deficits above. Recommend home with 24hr sup/assist and HHPT. Continue POC. Treatment Diagnosis: Decreased functional mobility, impaired gait  Prognosis: Good  Decision Making: Medium Complexity  PT Education: Goals;PT Role;Plan of Care;Transfer Training;Gait Training  Patient Education: Pt verbalized understanding. REQUIRES PT FOLLOW UP: Yes  Activity Tolerance  Activity Tolerance: Patient Tolerated treatment well       Patient Diagnosis(es): There were no encounter diagnoses. has a past medical history of Bronchitis chronic, Chest pain, Chronic cough, Cirrhosis of liver (HCC), COPD (chronic obstructive pulmonary disease) ([address] COPD (chronic obstructive pulmonary disease) ([address] Diabetes mellitus ([address] Gout, Hilar adenopathy, Hyperlipidemia, Hypertension, Knee pain, right, Numbness and tingling of leg, Osteoarthritis, Paresthesia of bilateral legs, PVD (peripheral vascular disease) ([address] Seizures ([address] and Substance abuse ([address] has a past surgical history that includes bronchoscopy (2011); Elbow surgery (Left); Cystoscopy (2/4/15); other surgical history (2014); other surgical history (2015);  Cystoscopy (2016); Independent(Reports low endurance, states able to walk around an entire [employer])  Transfer Assistance: Independent  Active : No  Patient's  Info: Brother  Occupation: Retired(\"on social security\")  Leisure & Hobbies: Fishing  Additional Comments: Denies falls in the past 3 months. Cognition   Cognition  Overall Cognitive Status: Exceptions  Arousal/Alertness: Delayed responses to stimuli  Following Commands: Follows one step commands consistently; Follows one step commands with increased time  Attention Span: Attends with cues to redirect; Difficulty dividing attention  Memory: Decreased recall of recent events  Safety Judgement: Decreased awareness of need for assistance;Decreased awareness of need for safety  Problem Solving: Assistance required to implement solutions;Assistance required to generate solutions  Insights: Decreased awareness of deficits  Initiation: Requires cues for all  Sequencing: Requires cues for some  Cognition Comment: Pt reports feeling \"weird\" throughout session and also reported feeling confused    Objective          AROM RLE (degrees)  RLE AROM: WFL  AROM LLE (degrees)  LLE AROM : WFL  Strength RLE  Strength RLE: Exception  Comment: Grossly 4-/5  Strength LLE  Strength LLE: Exception  Comment: Grossly 4-/5  Tone RLE  RLE Tone: Normotonic  Tone LLE  LLE Tone: Normotonic  Sensation  Overall Sensation Status: Impaired(left hx of carpal tunnel syndrom, BLE N/T remaining post surgery)     Transfers  Sit to Stand: Contact guard assistance  Stand to sit: Contact guard assistance  Bed to Chair: Contact guard assistance  Comment: with 4 [facility]  Ambulation  Ambulation?: Yes  More Ambulation?: No  Ambulation 1  Surface: level tile  Device: Rollator  Assistance: Contact guard assistance  Quality of Gait: Pt feeling off, so given abrieviated path. Pt ambulated 50 feet with decreased step length, decreased step height, decreased edenilson, and mild unsteadiness with no LOB.   Distance: 50 feet     Balance  Posture: Fair  Sitting - Static: Good  Sitting - Dynamic: Good  Standing - Static: Fair;+  Standing - Dynamic: Fair  Comments: with 4 [address]  Times per week: 5-7x in the ICU  Current Treatment Recommendations: Strengthening, Balance Training, Functional Mobility Training, Transfer Training, Stair training, Gait Training, Endurance Training, Neuromuscular Re-education, Home Exercise Program, Safety Education & Training, Patient/Caregiver Education & Training, Equipment Evaluation, Education, & procurement  Safety Devices  Type of devices: All fall risk precautions in place, Bed alarm in place, Call light within reach, Nurse notified, Gait belt, Patient at risk for falls, Left in bed    AM-PAC Score  AM-PAC Inpatient Mobility Raw Score : 17 (08/26/20 1640)  AM-PAC Inpatient T-Scale Score : 42.13 (08/26/20 1640)  Mobility Inpatient CMS 0-100% Score: 50.57 (08/26/20 1640)  Mobility Inpatient CMS G-Code Modifier : CK (08/26/20 1640)        Goals  Short term goals  Time Frame for Short term goals: 5-7 days (unless otherwise stated) by 9/2/20  Short term goal 1: Pt will complete bed mobility with independence. Short term goal 2: Pt will complete transfers with independence. Short term goal 3: Pt will ambulate 150 feet with independence and LRAD. Short term goal 4: Pt will ascend/descend 3 steps with one rail. Short term goal 5: Pt will complete 12-15 reps of BLE ther ex for strengthening and ROM by 8/29/20. Patient Goals   Patient goals : To go home     Therapy Time   Individual Concurrent Group Co-treatment   Time In 1440         Time Out 1517         Minutes 37         Timed Code Treatment Minutes: 27 Minutes(10 minute eval)     If the pt is discharged prior to the next treatment session, this will serve as the discharge summary.      Leyla Cortez, PT

## 2020-08-26 NOTE — PROGRESS NOTES
Castrejon removed. Pt instructed need to void within 6 hours. Will continue to monitor.  Ama Bullock

## 2020-08-26 NOTE — PROGRESS NOTES
Pt ambulated in hallway at this time. Pt reports lightheadedness after 5ft. Pt provided rest break. Pt able to ambulate 50ft with SBA and rolling wx. Pt tolerated fairly well.

## 2020-08-27 LAB
ANION GAP SERPL CALCULATED.3IONS-SCNC: 19 MMOL/L (ref 3–16)
BUN BLDV-MCNC: 9 MG/DL (ref 7–20)
CALCIUM SERPL-MCNC: 8.8 MG/DL (ref 8.3–10.6)
CHLORIDE BLD-SCNC: 100 MMOL/L (ref 99–110)
CO2: 20 MMOL/L (ref 21–32)
CREAT SERPL-MCNC: 0.7 MG/DL (ref 0.8–1.3)
GFR AFRICAN AMERICAN: >60
GFR NON-AFRICAN AMERICAN: >60
GLUCOSE BLD-MCNC: 103 MG/DL (ref 70–99)
GLUCOSE BLD-MCNC: 109 MG/DL (ref 70–99)
GLUCOSE BLD-MCNC: 114 MG/DL (ref 70–99)
GLUCOSE BLD-MCNC: 117 MG/DL (ref 70–99)
GLUCOSE BLD-MCNC: 124 MG/DL (ref 70–99)
GLUCOSE BLD-MCNC: 141 MG/DL (ref 70–99)
PERFORMED ON: ABNORMAL
POTASSIUM REFLEX MAGNESIUM: 3.6 MMOL/L (ref 3.5–5.1)
SODIUM BLD-SCNC: 139 MMOL/L (ref 136–145)

## 2020-08-27 PROCEDURE — 2700000000 HC OXYGEN THERAPY PER DAY

## 2020-08-27 PROCEDURE — 6370000000 HC RX 637 (ALT 250 FOR IP): Performed by: SURGERY

## 2020-08-27 PROCEDURE — 2580000003 HC RX 258: Performed by: SURGERY

## 2020-08-27 PROCEDURE — 97110 THERAPEUTIC EXERCISES: CPT

## 2020-08-27 PROCEDURE — 6360000002 HC RX W HCPCS: Performed by: SURGERY

## 2020-08-27 PROCEDURE — 80048 BASIC METABOLIC PNL TOTAL CA: CPT

## 2020-08-27 PROCEDURE — 94640 AIRWAY INHALATION TREATMENT: CPT

## 2020-08-27 PROCEDURE — 94669 MECHANICAL CHEST WALL OSCILL: CPT

## 2020-08-27 PROCEDURE — 97530 THERAPEUTIC ACTIVITIES: CPT

## 2020-08-27 PROCEDURE — 94761 N-INVAS EAR/PLS OXIMETRY MLT: CPT

## 2020-08-27 PROCEDURE — 2500000003 HC RX 250 WO HCPCS: Performed by: SURGERY

## 2020-08-27 PROCEDURE — 2000000000 HC ICU R&B

## 2020-08-27 PROCEDURE — 36415 COLL VENOUS BLD VENIPUNCTURE: CPT

## 2020-08-27 PROCEDURE — 97535 SELF CARE MNGMENT TRAINING: CPT

## 2020-08-27 RX ORDER — QUETIAPINE FUMARATE 25 MG/1
25 TABLET, FILM COATED ORAL 2 TIMES DAILY
Status: DISCONTINUED | OUTPATIENT
Start: 2020-08-27 | End: 2020-08-31 | Stop reason: HOSPADM

## 2020-08-27 RX ORDER — TAMSULOSIN HYDROCHLORIDE 0.4 MG/1
0.4 CAPSULE ORAL DAILY
Status: DISCONTINUED | OUTPATIENT
Start: 2020-08-27 | End: 2020-08-31 | Stop reason: HOSPADM

## 2020-08-27 RX ORDER — METOPROLOL TARTRATE 50 MG/1
50 TABLET, FILM COATED ORAL 2 TIMES DAILY
Status: DISCONTINUED | OUTPATIENT
Start: 2020-08-27 | End: 2020-08-31 | Stop reason: HOSPADM

## 2020-08-27 RX ORDER — PANTOPRAZOLE SODIUM 40 MG/1
40 TABLET, DELAYED RELEASE ORAL
Status: DISCONTINUED | OUTPATIENT
Start: 2020-08-27 | End: 2020-08-31 | Stop reason: HOSPADM

## 2020-08-27 RX ADMIN — MUPIROCIN: 20 OINTMENT TOPICAL at 08:36

## 2020-08-27 RX ADMIN — METOPROLOL TARTRATE 5 MG: 5 INJECTION INTRAVENOUS at 00:11

## 2020-08-27 RX ADMIN — METOPROLOL TARTRATE 5 MG: 5 INJECTION INTRAVENOUS at 06:25

## 2020-08-27 RX ADMIN — QUETIAPINE FUMARATE 25 MG: 25 TABLET ORAL at 08:35

## 2020-08-27 RX ADMIN — MUPIROCIN: 20 OINTMENT TOPICAL at 20:46

## 2020-08-27 RX ADMIN — MORPHINE SULFATE 4 MG: 4 INJECTION, SOLUTION INTRAMUSCULAR; INTRAVENOUS at 06:25

## 2020-08-27 RX ADMIN — QUETIAPINE FUMARATE 25 MG: 25 TABLET ORAL at 20:44

## 2020-08-27 RX ADMIN — SODIUM CHLORIDE: 9 INJECTION, SOLUTION INTRAVENOUS at 16:14

## 2020-08-27 RX ADMIN — MORPHINE SULFATE 4 MG: 4 INJECTION, SOLUTION INTRAMUSCULAR; INTRAVENOUS at 13:52

## 2020-08-27 RX ADMIN — METOPROLOL TARTRATE 50 MG: 50 TABLET, FILM COATED ORAL at 08:35

## 2020-08-27 RX ADMIN — ALBUTEROL SULFATE 2.5 MG: 2.5 SOLUTION RESPIRATORY (INHALATION) at 20:04

## 2020-08-27 RX ADMIN — METOPROLOL TARTRATE 50 MG: 50 TABLET, FILM COATED ORAL at 20:44

## 2020-08-27 RX ADMIN — MORPHINE SULFATE 4 MG: 4 INJECTION, SOLUTION INTRAMUSCULAR; INTRAVENOUS at 09:49

## 2020-08-27 RX ADMIN — Medication 10 ML: at 20:45

## 2020-08-27 RX ADMIN — MORPHINE SULFATE 4 MG: 4 INJECTION, SOLUTION INTRAMUSCULAR; INTRAVENOUS at 18:17

## 2020-08-27 RX ADMIN — MORPHINE SULFATE 4 MG: 4 INJECTION, SOLUTION INTRAMUSCULAR; INTRAVENOUS at 20:45

## 2020-08-27 RX ADMIN — DILTIAZEM HYDROCHLORIDE 360 MG: 120 CAPSULE, COATED, EXTENDED RELEASE ORAL at 08:36

## 2020-08-27 RX ADMIN — PROMETHAZINE HYDROCHLORIDE 12.5 MG: 25 TABLET ORAL at 20:44

## 2020-08-27 RX ADMIN — ALBUTEROL SULFATE 2.5 MG: 2.5 SOLUTION RESPIRATORY (INHALATION) at 07:47

## 2020-08-27 RX ADMIN — TAMSULOSIN HYDROCHLORIDE 0.4 MG: 0.4 CAPSULE ORAL at 08:35

## 2020-08-27 RX ADMIN — PANTOPRAZOLE SODIUM 40 MG: 40 TABLET, DELAYED RELEASE ORAL at 08:36

## 2020-08-27 RX ADMIN — INSULIN LISPRO 2 UNITS: 100 INJECTION, SOLUTION INTRAVENOUS; SUBCUTANEOUS at 20:46

## 2020-08-27 ASSESSMENT — PAIN - FUNCTIONAL ASSESSMENT: PAIN_FUNCTIONAL_ASSESSMENT: ACTIVITIES ARE NOT PREVENTED

## 2020-08-27 ASSESSMENT — PAIN DESCRIPTION - PAIN TYPE
TYPE: SURGICAL PAIN
TYPE: SURGICAL PAIN

## 2020-08-27 ASSESSMENT — PAIN DESCRIPTION - LOCATION
LOCATION: ABDOMEN
LOCATION: ABDOMEN

## 2020-08-27 ASSESSMENT — PAIN SCALES - GENERAL
PAINLEVEL_OUTOF10: 0
PAINLEVEL_OUTOF10: 0
PAINLEVEL_OUTOF10: 7
PAINLEVEL_OUTOF10: 10
PAINLEVEL_OUTOF10: 0
PAINLEVEL_OUTOF10: 7
PAINLEVEL_OUTOF10: 7
PAINLEVEL_OUTOF10: 0
PAINLEVEL_OUTOF10: 5
PAINLEVEL_OUTOF10: 7

## 2020-08-27 ASSESSMENT — PAIN DESCRIPTION - ONSET: ONSET: ON-GOING

## 2020-08-27 ASSESSMENT — PAIN DESCRIPTION - ORIENTATION
ORIENTATION: MID
ORIENTATION: MID

## 2020-08-27 ASSESSMENT — PAIN DESCRIPTION - DESCRIPTORS: DESCRIPTORS: ACHING;SHARP

## 2020-08-27 ASSESSMENT — PAIN DESCRIPTION - FREQUENCY: FREQUENCY: CONTINUOUS

## 2020-08-27 ASSESSMENT — PAIN DESCRIPTION - PROGRESSION: CLINICAL_PROGRESSION: GRADUALLY IMPROVING

## 2020-08-27 NOTE — PROGRESS NOTES
Physical Therapy  Facility/Department: [facility] B2 - ICU  Daily Treatment Note  NAME: Sophia Pimentel  : 1955  MRN: [id number]    Date of Service: 2020    Discharge Recommendations:  Continue to assess pending progress   PT Equipment Recommendations  Equipment Needed: (CTA, likely will need RW vs rollator)  If pt is unable to be seen after this session, please let this note serve as discharge summary. Please see case management note for discharge disposition. Thank you. Assessment   Body structures, Functions, Activity limitations: Decreased functional mobility ; Decreased strength;Decreased safe awareness;Decreased endurance; Increased pain  Assessment: Pt with decline in mobility this date. Pt wtih c/o pain and dizzines upon standing. decreased BP after RN gave pain medication. Only able to ambulate to the door and back to bed (15'). Will continue to assess progress with mobility for DC recommendations. May need SNF if unable to ambulate functional distances and manage 3 steps (pt has 3 steps to enter home). Treatment Diagnosis: Decreased functional mobility, impaired gait  Prognosis: Good  Decision Making: Medium Complexity  PT Education: Goals;PT Role;Plan of Care;Transfer Training;Gait Training;Disease Specific Education;Precautions  Patient Education: Pt verbalized understanding to safe mobility with AD  REQUIRES PT FOLLOW UP: Yes  Activity Tolerance  Activity Tolerance: Patient limited by fatigue;Patient limited by endurance  Activity Tolerance: decreased BP 94/52 after standing     Patient Diagnosis(es): There were no encounter diagnoses.      has a past medical history of Bronchitis chronic, Chest pain, Chronic cough, Cirrhosis of liver (HCC), COPD (chronic obstructive pulmonary disease) ([address] [address] COPD (chronic obstructive pulmonary disease) ([address] Diabetes mellitus ([address] Gout, Hilar adenopathy, Hyperlipidemia, Hypertension, Knee pain, right, Numbness and tingling of leg, Osteoarthritis, Paresthesia of bilateral legs, PVD (peripheral vascular disease) ([facility] [address] Seizures ([facility] [address] and Substance abuse ([facility] [address] has a past surgical history that includes bronchoscopy (2/7/2011); Elbow surgery (Left); Cystoscopy (2/4/15); other surgical history (05-); other surgical history (09/09/2015); Cystoscopy (2/24/2016); Colonoscopy (N/A, 7/23/2018); Upper gastrointestinal endoscopy (N/A, 7/23/2018); Colonoscopy (N/A, 7/23/2018); Endoscopy, small bowel with ileum (N/A, 2/20/2019); and Aorto-iliac Bypass Graft (N/A, 8/24/2020). Restrictions  Restrictions/Precautions  Restrictions/Precautions: Surgical Protocols, General Precautions, Fall Risk  Position Activity Restriction  Other position/activity restrictions: ambulate pt, up to chair; chavira  Subjective   General  Chart Reviewed: Yes  Additional Pertinent Hx: POD #2 ABF bypass, graft patent with good perfusion on 8/24/20  Response To Previous Treatment: Patient with no complaints from previous session. Family / Caregiver Present: No  Referring Practitioner: Deborah Celaya MD  Subjective  Subjective: Pt agreeable to therapy. 7/10 pain in knee and stomach.  RN notified and provided medication  General Comment  Comments: cleared by nursing          Orientation  Orientation  Overall Orientation Status: Within Normal Limits  Cognition      Objective   Bed mobility  Supine to Sit: Unable to assess  Sit to Supine: Minimal assistance  Transfers  Sit to Stand: Contact guard assistance;Minimal Assistance(min assist with fatigue from the chair)  Stand to sit: Contact guard assistance  Ambulation  Ambulation?: Yes  More Ambulation?: No  Ambulation 1  Surface: level tile  Device: Rollator  Assistance: Contact guard assistance  Gait Deviations: Slow Francisca  Distance: 15'  Comments: limited by fatigue and dizziness  Stairs/Curb  Stairs?: No     Balance  Posture: Fair  Sitting - Static: Good  Sitting - Dynamic: Good  Standing - Static: Fair;+  Standing - Dynamic: Fair  Exercises  Hip Flexion: x10 B  Knee Long Arc Quad: x10 B  Ankle Pumps: x10 B   AROM RLE (degrees)  RLE AROM: WFL  AROM LLE (degrees)  LLE AROM : WFL  Strength RLE  Comment: Grossly 4-/5  Strength LLE  Comment: Grossly 4-/5        AM-PAC Score  AM-PAC Inpatient Mobility Raw Score : 15 (08/27/20 1057)  AM-PAC Inpatient T-Scale Score : 39.45 (08/27/20 1057)  Mobility Inpatient CMS 0-100% Score: 57.7 (08/27/20 1057)  Mobility Inpatient CMS G-Code Modifier : CK (08/27/20 1057)          Goals  Short term goals  Time Frame for Short term goals: 5-7 days (unless otherwise stated) by 9/2/20  Short term goal 1: Pt will complete bed mobility with independence. Short term goal 2: Pt will complete transfers with independence. Short term goal 3: Pt will ambulate 150 feet with independence and LRAD. Short term goal 4: Pt will ascend/descend 3 steps with one rail. Short term goal 5: Pt will complete 12-15 reps of BLE ther ex for strengthening and ROM by 8/29/20. Patient Goals   Patient goals : To go home    Plan    Plan  Times per week: 5-7x in the ICU  Times per day: Daily  Current Treatment Recommendations: Strengthening, Balance Training, Functional Mobility Training, Transfer Training, Stair training, Gait Training, Endurance Training, Neuromuscular Re-education, Home Exercise Program, Safety Education & Training, Patient/Caregiver Education & Training, Equipment Evaluation, Education, & procurement  Safety Devices  Type of devices:  All fall risk precautions in place, Bed alarm in place, Call light within reach, Nurse notified, Gait belt, Patient at risk for falls, Left in bed  Restraints  Initially in place: No     Therapy Time   Individual Concurrent Group Co-treatment   Time In 0945         Time Out 1017         Minutes [facility] 7, [address] [facility]

## 2020-08-27 NOTE — PROGRESS NOTES
Assessment complete- see doc flow sheet. Pt A+Ox2- disoriented to situation and time. ST on monitor, on 1L NC. Medications given per MAR. Repositioned for comfort. Bed alarm engaged, call light within reach. Will continue to monitor.

## 2020-08-27 NOTE — PROGRESS NOTES
Spoke with Dr. Saravanan Isaac orders to just put chavira back in since pt has already been straight cathed x1. Will implement.

## 2020-08-27 NOTE — PLAN OF CARE
Pt high fall risk. Instructed to use call light before getting out of bed. Call light within reach. Bed in low position. Bed alarm on. Will continue to monitor. Pt complaint of pain. See MAR. Call light in reach, will continue to monitor.

## 2020-08-27 NOTE — PLAN OF CARE
Problem: Pain:  Goal: Pain level will decrease  Description: Pain level will decrease  Outcome: Ongoing     Problem: Pain:  Goal: Control of acute pain  Description: Control of acute pain  Outcome: Ongoing     Problem: Skin Integrity:  Goal: Will show no infection signs and symptoms  Description: Will show no infection signs and symptoms  Outcome: Ongoing     Problem: Skin Integrity:  Goal: Absence of new skin breakdown  Description: Absence of new skin breakdown  Outcome: Ongoing     Problem: Bleeding:  Goal: Will show no signs and symptoms of excessive bleeding  Description: Will show no signs and symptoms of excessive bleeding  Outcome: Ongoing     Problem: Tissue Perfusion - Cardiopulmonary, Altered:  Goal: Absence of angina  Description: Absence of angina  Outcome: Ongoing     Problem: Tissue Perfusion - Cardiopulmonary, Altered:  Goal: Hemodynamic stability will improve  Description: Hemodynamic stability will improve  Outcome: Ongoing     Problem: Tissue Perfusion - Cardiopulmonary, Altered:  Goal: Will show no evidence of cardiac arrhythmias  Description: Will show no evidence of cardiac arrhythmias  Outcome: Ongoing     Problem: Discharge Planning:  Goal: Discharged to appropriate level of care  Description: Discharged to appropriate level of care  Outcome: Ongoing     Problem: Serum Glucose Level - Abnormal:  Goal: Ability to maintain appropriate glucose levels will improve  Description: Ability to maintain appropriate glucose levels will improve  Outcome: Ongoing     Problem: Falls - Risk of:  Goal: Will remain free from falls  Description: Will remain free from falls  Outcome: Ongoing     Problem: Falls - Risk of:  Goal: Absence of physical injury  Description: Absence of physical injury  Outcome: Ongoing

## 2020-08-27 NOTE — PROGRESS NOTES
Occupational Therapy  Facility/Department: [facility] B2 - ICU  Daily Treatment Note   NAME: Paul Rodriguez  : 1955  MRN: [id number]    Date of Service: 2020    Discharge Recommendations:  Home with Home health OT, 24 hour supervision or assist     Assessment   Performance deficits / Impairments: Decreased functional mobility ; Decreased ADL status; Decreased endurance;Decreased balance;Decreased cognition;Decreased safe awareness;Decreased high-level IADLs;Decreased posture  Assessment: Pt performed functional mobility with CGA/min A. Pt followed directions appropriately during mobility for safe transfer and to protect incisional area. Max/D provided for LE ADLs. Pt reports that he will have / support at home. Cont OT. Prognosis: Good  OT Education: OT Role;Plan of Care;Orientation;Equipment;Precautions; ADL Adaptive Strategies;Transfer Training;Energy Conservation  Patient Education: Disease specific ed:  bed mobility and safe transfers-Pt receptive to education. REQUIRES OT FOLLOW UP: Yes  Activity Tolerance  Activity Tolerance: Treatment limited secondary to decreased cognition;Patient limited by fatigue  Activity Tolerance: /77, HR 97, O2 sats 98% on 2LO2  Safety Devices  Type of devices: Nurse notified;Call light within reach; Left in bed         Patient Diagnosis(es): There were no encounter diagnoses. has a past medical history of Bronchitis chronic, Chest pain, Chronic cough, Cirrhosis of liver (HCC), COPD (chronic obstructive pulmonary disease) ([address] COPD (chronic obstructive pulmonary disease) ([address] Diabetes mellitus ([address] Gout, Hilar adenopathy, Hyperlipidemia, Hypertension, Knee pain, right, Numbness and tingling of leg, Osteoarthritis, Paresthesia of bilateral legs, PVD (peripheral vascular disease) ([address] Seizures ([address] and Substance abuse ([address] has a past surgical history that includes bronchoscopy (2011); Elbow surgery (Left);  Cystoscopy (2/4/15); other surgical assistance(4WW)  Sit to stand: Contact guard assistance  Stand to sit: Contact guard assistance       Cognition  Arousal/Alertness: Delayed responses to stimuli  Following Commands: Follows one step commands with increased time  Attention Span: Attends with cues to redirect  Problem Solving: Assistance required to identify errors made;Assistance required to correct errors made  Sequencing: Requires cues for some  Cognition Comment: vc's for safety      Type of ROM/Therapeutic Exercise  Type of ROM/Therapeutic Exercise: AROM  Comment: seated  Exercises  Shoulder Flexion: 10x to 80*  Elbow Flexion: 10x  Elbow Extension: 10x  Grasp/Release: 10x       Plan   Plan  Times per week: 4-5x/week  Current Treatment Recommendations: Strengthening, Balance Training, Functional Mobility Training, Endurance Training, Patient/Caregiver Education & Training, Equipment Evaluation, Education, & procurement, Pain Management, Positioning, Safety Education & Training, Self-Care / ADL, Cognitive Reorientation  AM-PAC Score   AM-[facility] Inpatient Daily Activity Raw Score: 14 (08/27/20 1003)  AM-PAC Inpatient ADL T-Scale Score : 33.39 (08/27/20 1003)  ADL Inpatient CMS 0-100% Score: 59.67 (08/27/20 1003)  ADL Inpatient CMS G-Code Modifier : CK (08/27/20 1003)  Goals  Short term goals  Time Frame for Short term goals: 1 week (by 9/2/20)  Short term goal 1: Pt will complete functional transfers with Mod I/Ind-ongoing, CGA with 4WW  Short term goal 2: Pt will complete LE dressing with Min A by 8/29-ongoing, max/D  Short term goal 3: Pt will perform >3 minutes dynamic standing activity with Supervision-ongoing, CGA with 5XK  Patient Goals   Patient goals : \"to go home\"     Therapy Time   Individual Concurrent Group Co-treatment   Time In 0747         Time Out 0826         Minutes 39         Timed Code Treatment Minutes: 44 Minutes    If pt is discharged prior to next OT session, this note will serve as the discharge summary.   Jody Galo OT

## 2020-08-27 NOTE — PROGRESS NOTES
4 Eyes Skin Assessment     The patient is being assess for  Shift Handoff    I agree that 2 RN's have performed a thorough Head to Toe Skin Assessment on the patient. ALL assessment sites listed below have been assessed. Areas assessed by both nurses: Carmina/Braxton  [x]   Head, Face, and Ears   [x]   Shoulders, Back, and Chest  [x]   Arms, Elbows, and Hands   [x]   Coccyx, Sacrum, and Ischum  [x]   Legs, Feet, and Heels        Does the Patient have Skin Breakdown?   No         Abraham Prevention initiated:  NA   Wound Care Orders initiated:  NA      WOC nurse consulted for Pressure Injury (Stage 3,4, Unstageable, DTI, NWPT, and Complex wounds):  NA      Nurse 1 eSignature: Electronically signed by Angelica Pineda RN on 8/27/20 at 7:10 PM EDT    **SHARE this note so that the co-signing nurse is able to place an eSignature**    Nurse 2 eSignature: {Esignature:[id number]}

## 2020-08-27 NOTE — PROGRESS NOTES
Pt constantly attempting to climb out of bed, pull on lines and remove BP cuff and o2. Pt redirectable but then does the same thing a couple minutes later. Avasys placed in room for pt safety. Call light within reach.

## 2020-08-28 LAB
GLUCOSE BLD-MCNC: 125 MG/DL (ref 70–99)
GLUCOSE BLD-MCNC: 134 MG/DL (ref 70–99)
GLUCOSE BLD-MCNC: 137 MG/DL (ref 70–99)
GLUCOSE BLD-MCNC: 195 MG/DL (ref 70–99)
PERFORMED ON: ABNORMAL

## 2020-08-28 PROCEDURE — 97110 THERAPEUTIC EXERCISES: CPT

## 2020-08-28 PROCEDURE — 6360000002 HC RX W HCPCS: Performed by: SURGERY

## 2020-08-28 PROCEDURE — 94640 AIRWAY INHALATION TREATMENT: CPT

## 2020-08-28 PROCEDURE — 97530 THERAPEUTIC ACTIVITIES: CPT

## 2020-08-28 PROCEDURE — 6370000000 HC RX 637 (ALT 250 FOR IP): Performed by: SURGERY

## 2020-08-28 PROCEDURE — 97535 SELF CARE MNGMENT TRAINING: CPT

## 2020-08-28 PROCEDURE — 2060000000 HC ICU INTERMEDIATE R&B

## 2020-08-28 PROCEDURE — 2580000003 HC RX 258: Performed by: SURGERY

## 2020-08-28 PROCEDURE — 94669 MECHANICAL CHEST WALL OSCILL: CPT

## 2020-08-28 RX ORDER — INSULIN GLARGINE 100 [IU]/ML
15 INJECTION, SOLUTION SUBCUTANEOUS NIGHTLY
Status: DISCONTINUED | OUTPATIENT
Start: 2020-08-28 | End: 2020-08-31 | Stop reason: HOSPADM

## 2020-08-28 RX ORDER — FOLIC ACID 1 MG/1
1 TABLET ORAL DAILY
Status: DISCONTINUED | OUTPATIENT
Start: 2020-08-28 | End: 2020-08-31 | Stop reason: HOSPADM

## 2020-08-28 RX ORDER — THIAMINE MONONITRATE (VIT B1) 100 MG
100 TABLET ORAL DAILY
Status: DISCONTINUED | OUTPATIENT
Start: 2020-08-28 | End: 2020-08-31 | Stop reason: HOSPADM

## 2020-08-28 RX ORDER — ATORVASTATIN CALCIUM 40 MG/1
40 TABLET, FILM COATED ORAL NIGHTLY
Status: DISCONTINUED | OUTPATIENT
Start: 2020-08-28 | End: 2020-08-31 | Stop reason: HOSPADM

## 2020-08-28 RX ORDER — ALLOPURINOL 300 MG/1
300 TABLET ORAL DAILY
Status: DISCONTINUED | OUTPATIENT
Start: 2020-08-28 | End: 2020-08-31 | Stop reason: HOSPADM

## 2020-08-28 RX ADMIN — FOLIC ACID 1 MG: 1 TABLET ORAL at 10:07

## 2020-08-28 RX ADMIN — DILTIAZEM HYDROCHLORIDE 360 MG: 120 CAPSULE, COATED, EXTENDED RELEASE ORAL at 10:08

## 2020-08-28 RX ADMIN — Medication 10 ML: at 21:08

## 2020-08-28 RX ADMIN — TAMSULOSIN HYDROCHLORIDE 0.4 MG: 0.4 CAPSULE ORAL at 10:07

## 2020-08-28 RX ADMIN — ALBUTEROL SULFATE 2.5 MG: 2.5 SOLUTION RESPIRATORY (INHALATION) at 20:02

## 2020-08-28 RX ADMIN — PANTOPRAZOLE SODIUM 40 MG: 40 TABLET, DELAYED RELEASE ORAL at 10:07

## 2020-08-28 RX ADMIN — INSULIN GLARGINE 15 UNITS: 100 INJECTION, SOLUTION SUBCUTANEOUS at 22:17

## 2020-08-28 RX ADMIN — Medication 10 ML: at 10:07

## 2020-08-28 RX ADMIN — MUPIROCIN: 20 OINTMENT TOPICAL at 21:08

## 2020-08-28 RX ADMIN — ONDANSETRON 4 MG: 2 INJECTION INTRAMUSCULAR; INTRAVENOUS at 21:08

## 2020-08-28 RX ADMIN — METOPROLOL TARTRATE 50 MG: 50 TABLET, FILM COATED ORAL at 21:08

## 2020-08-28 RX ADMIN — MORPHINE SULFATE 4 MG: 4 INJECTION, SOLUTION INTRAMUSCULAR; INTRAVENOUS at 18:13

## 2020-08-28 RX ADMIN — Medication 10 ML: at 18:14

## 2020-08-28 RX ADMIN — METOPROLOL TARTRATE 50 MG: 50 TABLET, FILM COATED ORAL at 10:07

## 2020-08-28 RX ADMIN — MUPIROCIN: 20 OINTMENT TOPICAL at 10:08

## 2020-08-28 RX ADMIN — Medication 100 MG: at 10:07

## 2020-08-28 RX ADMIN — MORPHINE SULFATE 4 MG: 4 INJECTION, SOLUTION INTRAMUSCULAR; INTRAVENOUS at 23:34

## 2020-08-28 RX ADMIN — MORPHINE SULFATE 4 MG: 4 INJECTION, SOLUTION INTRAMUSCULAR; INTRAVENOUS at 14:17

## 2020-08-28 RX ADMIN — ALBUTEROL SULFATE 2.5 MG: 2.5 SOLUTION RESPIRATORY (INHALATION) at 09:05

## 2020-08-28 RX ADMIN — MORPHINE SULFATE 2 MG: 2 INJECTION, SOLUTION INTRAMUSCULAR; INTRAVENOUS at 10:06

## 2020-08-28 RX ADMIN — INSULIN LISPRO 2 UNITS: 100 INJECTION, SOLUTION INTRAVENOUS; SUBCUTANEOUS at 10:08

## 2020-08-28 RX ADMIN — ALLOPURINOL 300 MG: 300 TABLET ORAL at 10:07

## 2020-08-28 RX ADMIN — ATORVASTATIN CALCIUM 40 MG: 40 TABLET, FILM COATED ORAL at 21:08

## 2020-08-28 RX ADMIN — MORPHINE SULFATE 4 MG: 4 INJECTION, SOLUTION INTRAMUSCULAR; INTRAVENOUS at 12:18

## 2020-08-28 RX ADMIN — QUETIAPINE FUMARATE 25 MG: 25 TABLET ORAL at 10:07

## 2020-08-28 RX ADMIN — QUETIAPINE FUMARATE 25 MG: 25 TABLET ORAL at 21:08

## 2020-08-28 RX ADMIN — MORPHINE SULFATE 4 MG: 4 INJECTION, SOLUTION INTRAMUSCULAR; INTRAVENOUS at 21:08

## 2020-08-28 ASSESSMENT — PAIN DESCRIPTION - PAIN TYPE
TYPE: SURGICAL PAIN

## 2020-08-28 ASSESSMENT — PAIN SCALES - GENERAL
PAINLEVEL_OUTOF10: 4
PAINLEVEL_OUTOF10: 5
PAINLEVEL_OUTOF10: 5
PAINLEVEL_OUTOF10: 4
PAINLEVEL_OUTOF10: 8
PAINLEVEL_OUTOF10: 4
PAINLEVEL_OUTOF10: 7
PAINLEVEL_OUTOF10: 5
PAINLEVEL_OUTOF10: 8
PAINLEVEL_OUTOF10: 6
PAINLEVEL_OUTOF10: 9
PAINLEVEL_OUTOF10: 8

## 2020-08-28 ASSESSMENT — PAIN DESCRIPTION - LOCATION
LOCATION: ABDOMEN
LOCATION: ABDOMEN

## 2020-08-28 ASSESSMENT — PAIN DESCRIPTION - FREQUENCY: FREQUENCY: CONTINUOUS

## 2020-08-28 ASSESSMENT — PAIN DESCRIPTION - ORIENTATION
ORIENTATION: MID
ORIENTATION: MID

## 2020-08-28 ASSESSMENT — PAIN DESCRIPTION - ONSET: ONSET: ON-GOING

## 2020-08-28 ASSESSMENT — PAIN DESCRIPTION - DESCRIPTORS: DESCRIPTORS: ACHING;SHARP

## 2020-08-28 NOTE — PROGRESS NOTES
Occupational Therapy  Facility/Department: [facility] B2 - ICU  Daily Treatment Note  NAME: Adrianna Yuan  : 1955  MRN: [id number]    Date of Service: 2020    Discharge Recommendations:  Home with Home health OT, 24 hour supervision or assist     Assessment   Performance deficits / Impairments: Decreased functional mobility ; Decreased ADL status; Decreased endurance;Decreased balance;Decreased safe awareness;Decreased high-level IADLs;Decreased posture  Assessment: Pt is progressing towards OT goals. He reports feeling better today. Performed functional mobility with SBA with 4WW and vc's for safety. Limited reach to BLE d/t discomfort but states that he will have assist at home as needed. Cont OT. Prognosis: Good  OT Education: OT Role;Plan of Care;Orientation;Equipment;Precautions; ADL Adaptive Strategies;Transfer Training;Energy Conservation  Patient Education: Disease specific ed: energy conservation, bed mobility and safe transfers-Pt receptive to education. REQUIRES OT FOLLOW UP: Yes  Activity Tolerance  Activity Tolerance: Patient Tolerated treatment well  Activity Tolerance: O2 sats on RA 98%,  after activity  Safety Devices  Type of devices: Nurse notified;Gait belt;Call light within reach; Left in chair       Patient Diagnosis(es): There were no encounter diagnoses. has a past medical history of Bronchitis chronic, Chest pain, Chronic cough, Cirrhosis of liver (HCC), COPD (chronic obstructive pulmonary disease) ([address] COPD (chronic obstructive pulmonary disease) ([address] Diabetes mellitus ([address] Gout, Hilar adenopathy, Hyperlipidemia, Hypertension, Knee pain, right, Numbness and tingling of leg, Osteoarthritis, Paresthesia of bilateral legs, PVD (peripheral vascular disease) ([address] Seizures ([address] and Substance abuse ([address] has a past surgical history that includes bronchoscopy (2011); Elbow surgery (Left);  Cystoscopy (2/4/15); other surgical history (2014); other surgical history (09/09/2015); Cystoscopy (2/24/2016); Colonoscopy (N/A, 7/23/2018); Upper gastrointestinal endoscopy (N/A, 7/23/2018); Colonoscopy (N/A, 7/23/2018); Endoscopy, small bowel with ileum (N/A, 2/20/2019); and Aorto-iliac Bypass Graft (N/A, 8/24/2020). Restrictions  Restrictions/Precautions  Restrictions/Precautions: Surgical Protocols, General Precautions, Fall Risk  Position Activity Restriction  Other position/activity restrictions: ambulate pt, up to chair; chavira  Subjective   General  Chart Reviewed: Yes, Progress Notes, History and Physical, Operative Notes  Patient assessed for rehabilitation services?: Yes  Family / Caregiver Present: No  Referring Practitioner: Edwige Gates MD  Diagnosis: S/P Aortobifemoral bypass 8/24/20  Subjective  Subjective: Pt resting in bed, agreeable to OT tx. General Comment  Comments: RN approved OT tx this am for OOB activity. Vital Signs  Patient Currently in Pain: Denies   Orientation  Orientation  Overall Orientation Status: Within Functional Limits  Objective    ADL  Feeding: Independent  Grooming: Setup(seated)  UE Dressing: Minimal assistance(to pull up and adjust gown)  LE Dressing: Maximum assistance(Limited by discomfort. Pt reports that he will have assist at home for LE ADLs from girlfriend.)  Toileting: Dependent/Total(chavira)     Balance  Sitting Balance: Supervision  Standing Balance: Stand by assistance(4WW)  Standing Balance  Time: x3 min  Functional Mobility  Functional - Mobility Device: 4-Wheeled Walker  Assist Level: Stand by assistance(from room and into hallway)  Bed mobility  Supine to Sit: Stand by assistance(HOB elevated, increased time to complete)  Sit to Supine: Unable to assess  Transfers  Stand Pivot Transfers: Stand by assistance(4ww)  Sit to stand: Stand by assistance  Stand to sit: Stand by assistance    Cognition  Arousal/Alertness: Appropriate responses to stimuli  Following Commands:  Follows one step commands consistently  Safety Judgement: Decreased awareness of need for safety  Problem Solving: Assistance required to correct errors made  Cognition Comment: vc's for safety       Plan   Plan  Times per week: 4-5x/week  Current Treatment Recommendations: Strengthening, Balance Training, Functional Mobility Training, Endurance Training, Patient/Caregiver Education & Training, Equipment Evaluation, Education, & procurement, Pain Management, Positioning, Safety Education & Training, Self-Care / ADL, Cognitive Reorientation  AM-PAC Score      AM-PAC Inpatient Daily Activity Raw Score: 16 (08/28/20 0901)  AM-PAC Inpatient ADL T-Scale Score : 35.96 (08/28/20 0901)  ADL Inpatient CMS 0-100% Score: 53.32 (08/28/20 0901)  ADL Inpatient CMS G-Code Modifier : CK (08/28/20 0901)    Goals  Short term goals  Time Frame for Short term goals: 1 week (by 9/2/20)  Short term goal 1: Pt will complete functional transfers with Mod I/Ind-ongoing, SBA with 5IH  Short term goal 2: Pt will complete LE dressing with Min A by 8/29-ongoing, max A, limited by discomfort and stature  Short term goal 3: Pt will perform >3 minutes dynamic standing activity with Supervision-ongoing, CGA with 4WW. Goal partially met, SBA x3 min with 4HG. Patient Goals   Patient goals : \"to go home\"     Therapy Time   Individual Concurrent Group Co-treatment   Time In 0820         Time Out 3712         Minutes 24         Timed Code Treatment Minutes: 24 Minutes    If pt is discharged prior to next OT session, this note will serve as the discharge summary.   Dk Westfall OT

## 2020-08-28 NOTE — PROGRESS NOTES
Pt to c430. Oriented to room. Personal belongings within reach. Bed locked. Pt has phone and remote/call light in his bed placed by his hands. Pt is alert and orient. Dinner ordered. Pt was a stand by assist with walker. Telemetry in place and active, confirmed with cmu don. Midline dry and intact, bl fem wellington dressings in place, dry and intact ok button flashing. Safety alarm in place and active, pt getting narcotics.

## 2020-08-28 NOTE — PROGRESS NOTES
4 Eyes Skin Assessment     The patient is being assess for   {Blank single:65282::\"Admission\",\"Transfer to New Unit\",\"Post-Op Surgical\",\"Cath Lab Post-Op\",\"Shift Handoff\"}    I agree that 2 RN's have performed a thorough Head to Toe Skin Assessment on the patient. ALL assessment sites listed below have been assessed. Areas assessed by both nurses:   [x]   Head, Face, and Ears   [x]   Shoulders, Back, and Chest, Abdomen  [x]   Arms, Elbows, and Hands   [x]   Coccyx, Sacrum, and Ischium  [x]   Legs, Feet, and Heels        Surgical wounds midline abd, bl groin right neck    **SHARE this note so that the co-signing nurse is able to place an eSignature**    Co-signer eSignature: {Esignature:[id number]}    Does the Patient have Skin Breakdown?   No          Abraham Prevention initiated:  Yes   Wound Care Orders initiated:  No      WOC nurse consulted for Pressure Injury (Stage 3,4, Unstageable, DTI, NWPT, Complex wounds)and New or Established Ostomies:  No      Primary Nurse eSignature: Electronically signed by Jay Watkins RN on 8/28/20 at 5:22 PM EDT

## 2020-08-28 NOTE — PROGRESS NOTES
Physical Therapy  Facility/Department: [facility] B2 - ICU  Daily Treatment Note  NAME: Edd Marsh  : 1955  MRN: [id number]    Date of Service: 2020    Discharge Recommendations:  24 hour supervision or assist, Home with Home health PT   PT Equipment Recommendations  Equipment Needed: (may need RW)  If pt is unable to be seen after this session, please let this note serve as discharge summary. Please see case management note for discharge disposition. Thank you. Assessment   Body structures, Functions, Activity limitations: Decreased functional mobility ; Decreased strength;Decreased safe awareness;Decreased endurance; Increased pain  Assessment: Improved tolerance to activity and ambulation distance. BP stable. No LOB with ambulation using rollator. Pt returned to chair at end of session. Recommend home with 24 hr A and HHPT upon DC  Treatment Diagnosis: Decreased functional mobility, impaired gait  Prognosis: Good  Decision Making: Medium Complexity  PT Education: Goals;PT Role;Plan of Care;Transfer Training;Gait Training;Disease Specific Education;Precautions  Patient Education: Pt verbalized understanding to safe mobility with AD  REQUIRES PT FOLLOW UP: Yes  Activity Tolerance  Activity Tolerance: Patient limited by fatigue;Patient limited by endurance; Patient Tolerated treatment well     Patient Diagnosis(es): There were no encounter diagnoses. has a past medical history of Bronchitis chronic, Chest pain, Chronic cough, Cirrhosis of liver (HCC), COPD (chronic obstructive pulmonary disease) ([address] COPD (chronic obstructive pulmonary disease) ([address] Diabetes mellitus ([address] Gout, Hilar adenopathy, Hyperlipidemia, Hypertension, Knee pain, right, Numbness and tingling of leg, Osteoarthritis, Paresthesia of bilateral legs, PVD (peripheral vascular disease) ([address] Seizures ([address] and Substance abuse ([address] has a past surgical history that includes bronchoscopy (2011);  Elbow surgery (Left);

## 2020-08-28 NOTE — PROGRESS NOTES
Vascular Surgery Progress Note      SUBJECTIVE:  No c/o this am    OBJECTIVE    Physical  CURRENT VITALS:  /76   Pulse 90   Temp 98.2 °F (36.8 °C)   Resp 18   Ht 5' 7\" (1.702 m)   Wt 208 lb 15.9 oz (94.8 kg)   SpO2 95%   BMI 32.73 kg/m²   24 HR INTAKE/OUTPUT:    Intake/Output Summary (Last 24 hours) at 8/28/2020 0845  Last data filed at 8/28/2020 0600  Gross per 24 hour   Intake 2309 ml   Output 2415 ml   Net -106 ml     Incisions intact  Palpable PT pulses        ASSESSMENT AND PLAN    POD #4 ABF   Doing Well   Transfer to    Increase activity   Will leave [place] one more day before removal

## 2020-08-29 LAB
GLUCOSE BLD-MCNC: 103 MG/DL (ref 70–99)
GLUCOSE BLD-MCNC: 112 MG/DL (ref 70–99)
GLUCOSE BLD-MCNC: 128 MG/DL (ref 70–99)
GLUCOSE BLD-MCNC: 158 MG/DL (ref 70–99)
GLUCOSE BLD-MCNC: 188 MG/DL (ref 70–99)
PERFORMED ON: ABNORMAL

## 2020-08-29 PROCEDURE — 2580000003 HC RX 258: Performed by: SURGERY

## 2020-08-29 PROCEDURE — 2700000000 HC OXYGEN THERAPY PER DAY

## 2020-08-29 PROCEDURE — 6370000000 HC RX 637 (ALT 250 FOR IP): Performed by: SURGERY

## 2020-08-29 PROCEDURE — 94761 N-INVAS EAR/PLS OXIMETRY MLT: CPT

## 2020-08-29 PROCEDURE — 94669 MECHANICAL CHEST WALL OSCILL: CPT

## 2020-08-29 PROCEDURE — 94640 AIRWAY INHALATION TREATMENT: CPT

## 2020-08-29 PROCEDURE — 6360000002 HC RX W HCPCS: Performed by: SURGERY

## 2020-08-29 PROCEDURE — 2060000000 HC ICU INTERMEDIATE R&B

## 2020-08-29 RX ORDER — SIMETHICONE 80 MG
80 TABLET,CHEWABLE ORAL EVERY 6 HOURS PRN
Status: DISCONTINUED | OUTPATIENT
Start: 2020-08-29 | End: 2020-08-31 | Stop reason: HOSPADM

## 2020-08-29 RX ORDER — BISACODYL 10 MG
10 SUPPOSITORY, RECTAL RECTAL DAILY PRN
Status: DISCONTINUED | OUTPATIENT
Start: 2020-08-29 | End: 2020-08-31 | Stop reason: HOSPADM

## 2020-08-29 RX ADMIN — Medication 100 MG: at 09:09

## 2020-08-29 RX ADMIN — ONDANSETRON 4 MG: 2 INJECTION INTRAMUSCULAR; INTRAVENOUS at 09:09

## 2020-08-29 RX ADMIN — INSULIN GLARGINE 15 UNITS: 100 INJECTION, SOLUTION SUBCUTANEOUS at 20:36

## 2020-08-29 RX ADMIN — QUETIAPINE FUMARATE 25 MG: 25 TABLET ORAL at 09:10

## 2020-08-29 RX ADMIN — SIMETHICONE 80 MG: 80 TABLET, CHEWABLE ORAL at 12:17

## 2020-08-29 RX ADMIN — INSULIN LISPRO 1 UNITS: 100 INJECTION, SOLUTION INTRAVENOUS; SUBCUTANEOUS at 20:36

## 2020-08-29 RX ADMIN — TAMSULOSIN HYDROCHLORIDE 0.4 MG: 0.4 CAPSULE ORAL at 09:10

## 2020-08-29 RX ADMIN — Medication 10 ML: at 09:10

## 2020-08-29 RX ADMIN — QUETIAPINE FUMARATE 25 MG: 25 TABLET ORAL at 20:35

## 2020-08-29 RX ADMIN — ALLOPURINOL 300 MG: 300 TABLET ORAL at 09:10

## 2020-08-29 RX ADMIN — Medication 10 ML: at 20:35

## 2020-08-29 RX ADMIN — FOLIC ACID 1 MG: 1 TABLET ORAL at 09:09

## 2020-08-29 RX ADMIN — MORPHINE SULFATE 2 MG: 2 INJECTION, SOLUTION INTRAMUSCULAR; INTRAVENOUS at 09:10

## 2020-08-29 RX ADMIN — MORPHINE SULFATE 2 MG: 2 INJECTION, SOLUTION INTRAMUSCULAR; INTRAVENOUS at 04:40

## 2020-08-29 RX ADMIN — PANTOPRAZOLE SODIUM 40 MG: 40 TABLET, DELAYED RELEASE ORAL at 04:41

## 2020-08-29 RX ADMIN — ALBUTEROL SULFATE 2.5 MG: 2.5 SOLUTION RESPIRATORY (INHALATION) at 19:45

## 2020-08-29 RX ADMIN — INSULIN LISPRO 1 UNITS: 100 INJECTION, SOLUTION INTRAVENOUS; SUBCUTANEOUS at 12:26

## 2020-08-29 RX ADMIN — ATORVASTATIN CALCIUM 40 MG: 40 TABLET, FILM COATED ORAL at 21:53

## 2020-08-29 RX ADMIN — DILTIAZEM HYDROCHLORIDE 360 MG: 120 CAPSULE, COATED, EXTENDED RELEASE ORAL at 09:09

## 2020-08-29 RX ADMIN — ALBUTEROL SULFATE 2.5 MG: 2.5 SOLUTION RESPIRATORY (INHALATION) at 07:46

## 2020-08-29 RX ADMIN — MORPHINE SULFATE 2 MG: 2 INJECTION, SOLUTION INTRAMUSCULAR; INTRAVENOUS at 18:12

## 2020-08-29 RX ADMIN — METOPROLOL TARTRATE 50 MG: 50 TABLET, FILM COATED ORAL at 09:10

## 2020-08-29 RX ADMIN — METOPROLOL TARTRATE 50 MG: 50 TABLET, FILM COATED ORAL at 20:35

## 2020-08-29 ASSESSMENT — PAIN SCALES - GENERAL
PAINLEVEL_OUTOF10: 5
PAINLEVEL_OUTOF10: 6
PAINLEVEL_OUTOF10: 4

## 2020-08-29 ASSESSMENT — PAIN DESCRIPTION - PAIN TYPE: TYPE: SURGICAL PAIN

## 2020-08-29 ASSESSMENT — PAIN DESCRIPTION - LOCATION: LOCATION: ABDOMEN

## 2020-08-29 NOTE — PLAN OF CARE
Problem: Falls - Risk of:  Goal: Will remain free from falls  Description: Will remain free from falls  Outcome: Ongoing   Fall precautions in place, bed alarm on, nonskid foot wear applied, bed in lowest position, and call light within reach. Will continue to monitor. Problem: Pain:  Goal: Control of acute pain  Description: Control of acute pain  Outcome: Ongoing   Pt has Morphine ordered for pain relief as needed, pain under control, call light in reach. Problem: Serum Glucose Level - Abnormal:  Goal: Ability to maintain appropriate glucose levels will improve  Description: Ability to maintain appropriate glucose levels will improve  Outcome: Ongoing   The patient will demonstrate an understanding of blood sugar control by verbalizing  with the goal of completion at discharge.

## 2020-08-30 LAB
GLUCOSE BLD-MCNC: 119 MG/DL (ref 70–99)
GLUCOSE BLD-MCNC: 149 MG/DL (ref 70–99)
GLUCOSE BLD-MCNC: 165 MG/DL (ref 70–99)
GLUCOSE BLD-MCNC: 205 MG/DL (ref 70–99)
GLUCOSE BLD-MCNC: 243 MG/DL (ref 70–99)
PERFORMED ON: ABNORMAL

## 2020-08-30 PROCEDURE — 6360000002 HC RX W HCPCS: Performed by: SURGERY

## 2020-08-30 PROCEDURE — 94761 N-INVAS EAR/PLS OXIMETRY MLT: CPT

## 2020-08-30 PROCEDURE — 94640 AIRWAY INHALATION TREATMENT: CPT

## 2020-08-30 PROCEDURE — 6370000000 HC RX 637 (ALT 250 FOR IP): Performed by: SURGERY

## 2020-08-30 PROCEDURE — 2580000003 HC RX 258: Performed by: SURGERY

## 2020-08-30 PROCEDURE — 2700000000 HC OXYGEN THERAPY PER DAY

## 2020-08-30 PROCEDURE — 97116 GAIT TRAINING THERAPY: CPT

## 2020-08-30 PROCEDURE — 2060000000 HC ICU INTERMEDIATE R&B

## 2020-08-30 PROCEDURE — 94669 MECHANICAL CHEST WALL OSCILL: CPT

## 2020-08-30 RX ORDER — HYDROCODONE BITARTRATE AND ACETAMINOPHEN 5; 325 MG/1; MG/1
2 TABLET ORAL EVERY 4 HOURS PRN
Status: DISCONTINUED | OUTPATIENT
Start: 2020-08-30 | End: 2020-08-31 | Stop reason: HOSPADM

## 2020-08-30 RX ORDER — HYDROCODONE BITARTRATE AND ACETAMINOPHEN 5; 325 MG/1; MG/1
1 TABLET ORAL EVERY 4 HOURS PRN
Status: DISCONTINUED | OUTPATIENT
Start: 2020-08-30 | End: 2020-08-31 | Stop reason: HOSPADM

## 2020-08-30 RX ADMIN — HYDROCODONE BITARTRATE AND ACETAMINOPHEN 2 TABLET: 5; 325 TABLET ORAL at 16:18

## 2020-08-30 RX ADMIN — Medication 10 ML: at 01:04

## 2020-08-30 RX ADMIN — METOPROLOL TARTRATE 50 MG: 50 TABLET, FILM COATED ORAL at 08:46

## 2020-08-30 RX ADMIN — INSULIN LISPRO 1 UNITS: 100 INJECTION, SOLUTION INTRAVENOUS; SUBCUTANEOUS at 20:44

## 2020-08-30 RX ADMIN — MORPHINE SULFATE 4 MG: 4 INJECTION, SOLUTION INTRAMUSCULAR; INTRAVENOUS at 01:04

## 2020-08-30 RX ADMIN — HYDROCODONE BITARTRATE AND ACETAMINOPHEN 2 TABLET: 5; 325 TABLET ORAL at 12:09

## 2020-08-30 RX ADMIN — Medication 10 ML: at 08:46

## 2020-08-30 RX ADMIN — QUETIAPINE FUMARATE 25 MG: 25 TABLET ORAL at 20:43

## 2020-08-30 RX ADMIN — INSULIN GLARGINE 15 UNITS: 100 INJECTION, SOLUTION SUBCUTANEOUS at 20:44

## 2020-08-30 RX ADMIN — HYDROCODONE BITARTRATE AND ACETAMINOPHEN 2 TABLET: 5; 325 TABLET ORAL at 20:43

## 2020-08-30 RX ADMIN — INSULIN LISPRO 2 UNITS: 100 INJECTION, SOLUTION INTRAVENOUS; SUBCUTANEOUS at 16:18

## 2020-08-30 RX ADMIN — FOLIC ACID 1 MG: 1 TABLET ORAL at 08:46

## 2020-08-30 RX ADMIN — ATORVASTATIN CALCIUM 40 MG: 40 TABLET, FILM COATED ORAL at 20:43

## 2020-08-30 RX ADMIN — Medication 10 ML: at 20:43

## 2020-08-30 RX ADMIN — ALLOPURINOL 300 MG: 300 TABLET ORAL at 08:46

## 2020-08-30 RX ADMIN — ALBUTEROL SULFATE 2.5 MG: 2.5 SOLUTION RESPIRATORY (INHALATION) at 07:43

## 2020-08-30 RX ADMIN — DILTIAZEM HYDROCHLORIDE 360 MG: 120 CAPSULE, COATED, EXTENDED RELEASE ORAL at 08:46

## 2020-08-30 RX ADMIN — SIMETHICONE 80 MG: 80 TABLET, CHEWABLE ORAL at 08:45

## 2020-08-30 RX ADMIN — MORPHINE SULFATE 2 MG: 2 INJECTION, SOLUTION INTRAMUSCULAR; INTRAVENOUS at 08:46

## 2020-08-30 RX ADMIN — TAMSULOSIN HYDROCHLORIDE 0.4 MG: 0.4 CAPSULE ORAL at 08:46

## 2020-08-30 RX ADMIN — BISACODYL 10 MG: 10 SUPPOSITORY RECTAL at 08:47

## 2020-08-30 RX ADMIN — PANTOPRAZOLE SODIUM 40 MG: 40 TABLET, DELAYED RELEASE ORAL at 05:11

## 2020-08-30 RX ADMIN — METOPROLOL TARTRATE 50 MG: 50 TABLET, FILM COATED ORAL at 20:43

## 2020-08-30 RX ADMIN — QUETIAPINE FUMARATE 25 MG: 25 TABLET ORAL at 08:46

## 2020-08-30 RX ADMIN — ALBUTEROL SULFATE 2.5 MG: 2.5 SOLUTION RESPIRATORY (INHALATION) at 19:03

## 2020-08-30 RX ADMIN — Medication 100 MG: at 08:46

## 2020-08-30 ASSESSMENT — PAIN DESCRIPTION - PAIN TYPE
TYPE: SURGICAL PAIN
TYPE: ACUTE PAIN
TYPE: SURGICAL PAIN
TYPE: SURGICAL PAIN

## 2020-08-30 ASSESSMENT — PAIN DESCRIPTION - LOCATION
LOCATION: GENERALIZED
LOCATION: ABDOMEN
LOCATION: LEG
LOCATION: ABDOMEN
LOCATION: ABDOMEN

## 2020-08-30 ASSESSMENT — PAIN SCALES - GENERAL
PAINLEVEL_OUTOF10: 7
PAINLEVEL_OUTOF10: 6
PAINLEVEL_OUTOF10: 8
PAINLEVEL_OUTOF10: 8

## 2020-08-30 ASSESSMENT — PAIN DESCRIPTION - DESCRIPTORS: DESCRIPTORS: SHARP;TINGLING

## 2020-08-30 ASSESSMENT — PAIN DESCRIPTION - ORIENTATION: ORIENTATION: RIGHT;LEFT

## 2020-08-30 ASSESSMENT — PAIN DESCRIPTION - FREQUENCY: FREQUENCY: CONTINUOUS

## 2020-08-30 NOTE — PROGRESS NOTES
([facility] COPD (chronic obstructive pulmonary disease) ([facility] Diabetes mellitus ([facility] Gout, Hilar adenopathy, Hyperlipidemia, Hypertension, Knee pain, right, Numbness and tingling of leg, Osteoarthritis, Paresthesia of bilateral legs, PVD (peripheral vascular disease) ([facility] Seizures ([facility] and Substance abuse ([facility] has a past surgical history that includes bronchoscopy (2/7/2011); Elbow surgery (Left); Cystoscopy (2/4/15); other surgical history (05-); other surgical history (09/09/2015); Cystoscopy (2/24/2016); Colonoscopy (N/A, 7/23/2018); Upper gastrointestinal endoscopy (N/A, 7/23/2018); Colonoscopy (N/A, 7/23/2018); Endoscopy, small bowel with ileum (N/A, 2/20/2019); and Aorto-iliac Bypass Graft (N/A, 8/24/2020). Restrictions  Restrictions/Precautions  Restrictions/Precautions: Surgical Protocols, General Precautions, Fall Risk  Position Activity Restriction  Other position/activity restrictions: ambulate pt, up to chair; chavira  Subjective   General  Chart Reviewed: Yes  Additional Pertinent Hx: POD #2 ABF bypass, graft patent with good perfusion on 8/24/20  Response To Previous Treatment: Patient with no complaints from previous session. Family / Caregiver Present: No  Referring Practitioner: Eli Welch MD  Subjective  Subjective: Pt supine in bed on approach, pleasant and agreeable to PT tx  General Comment  Comments: RN cleared pt for session  Pain Screening  Patient Currently in Pain: Yes  Pain Assessment  Pain Assessment: 0-10  Pain Level: 7  Pain Type: Surgical pain  Pain Location: Abdomen  Non-Pharmaceutical Pain Intervention(s): Ambulation/Increased Activity;Repositioned; Therapeutic presence  Vital Signs  Patient Currently in Pain: Yes       Orientation  Orientation  Overall Orientation Status: Within Normal Limits  Cognition   Cognition  Overall Cognitive Status: WFL     Objective   Bed mobility  Supine to Sit: Supervision(To L, HOB elevated, use of BR)  Sit to Supine: Supervision Transfers  Sit to Stand: Stand by assistance  Stand to sit: Supervision  Comment: EOB to RW completed x 2 trials, w/c to RW completed x 3 trials, intermittent cues for hand placement     Ambulation  Ambulation?: Yes  Ambulation 1  Surface: level tile  Device: Rolling Walker  Assistance: Stand by assistance  Quality of Gait: Slight slowed pace, forward flexed trunk downward gaze. Pt steady no overt LOB  Gait Deviations: Slow Francisca; Increased LUPE; Decreased step length;Decreased step height  Distance: 150' + 15'  Comments: Pt takes x 1 standing rest break with longer bout of gait d/t fatigue  Stairs/Curb  Stairs?: Yes  Stairs  # Steps : 8  Stairs Height: 6\"  Rails: Bilateral  Device: No Device  Assistance: Stand by assistance;Contact guard assistance  Comment: Pt ascends with reciprocal pattern, descends with step to pattern. Steady with no LOB     Balance  Posture: Fair  Sitting - Static: Good  Sitting - Dynamic: Good  Standing - Static: Fair;+  Standing - Dynamic: Fair;+  Comments: With RW                          AM-PAC Score  AM-PAC Inpatient Mobility Raw Score : 19 (08/30/20 1723)  AM-PAC Inpatient T-Scale Score : 45.44 (08/30/20 1723)  Mobility Inpatient CMS 0-100% Score: 41.77 (08/30/20 1723)  Mobility Inpatient CMS G-Code Modifier : CK (08/30/20 1723)          Goals  Short term goals  Time Frame for Short term goals: 5-7 days (unless otherwise stated) by 9/2/20  Short term goal 1: Pt will complete bed mobility with independence. -- 8/30: S supine <> sit  Short term goal 2: Pt will complete transfers with independence. -- 8/30: SBA with RW  Short term goal 3: Pt will ambulate 150 feet with independence and LRAD.  -- 8/30: SBA x 150' with RW  Short term goal 4: Pt will ascend/descend 3 steps with one rail with S -- 8/30: x 4 steps BHR SBA  Short term goal 5: Pt will complete 12-15 reps of BLE ther ex for strengthening and ROM by 8/29/20. -- 8/30: DNT, date extended to 9/01, focus on gait and stairs in preparation for likely d/c home this date  Patient Goals   Patient goals : To go home    Plan    Plan  Times per week: 3-5x/wk  Times per day: Daily  Specific instructions for Next Treatment: Progress mobility as tolerated  Current Treatment Recommendations: Strengthening, Balance Training, Functional Mobility Training, Transfer Training, Stair training, Gait Training, Endurance Training, Neuromuscular Re-education, Home Exercise Program, Safety Education & Training, Patient/Caregiver Education & Training, Equipment Evaluation, Education, & procurement  Safety Devices  Type of devices: All fall risk precautions in place, Call light within reach, Nurse notified, Gait belt, Patient at risk for falls, Bed alarm in place, Left in bed  Restraints  Initially in place: No     Therapy Time   Individual Concurrent Group Co-treatment   Time In 1022         Time Out 1050         Minutes 28         Timed Code Treatment Minutes: 28 Minutes     If pt is unable to be seen after this session, please let this note serve as discharge summary. Please see case management note for discharge disposition. Thank you.     Edgar Schneider, PT, DPT

## 2020-08-31 ENCOUNTER — TELEPHONE (OUTPATIENT)
Dept: FAMILY MEDICINE CLINIC | Age: 65
End: 2020-08-31

## 2020-08-31 VITALS
TEMPERATURE: 98.2 F | WEIGHT: 210.9 LBS | HEART RATE: 80 BPM | RESPIRATION RATE: 17 BRPM | OXYGEN SATURATION: 90 % | HEIGHT: 67 IN | SYSTOLIC BLOOD PRESSURE: 145 MMHG | DIASTOLIC BLOOD PRESSURE: 70 MMHG | BODY MASS INDEX: 33.1 KG/M2

## 2020-08-31 LAB
GLUCOSE BLD-MCNC: 115 MG/DL (ref 70–99)
GLUCOSE BLD-MCNC: 136 MG/DL (ref 70–99)
PERFORMED ON: ABNORMAL
PERFORMED ON: ABNORMAL

## 2020-08-31 PROCEDURE — 6360000002 HC RX W HCPCS: Performed by: SURGERY

## 2020-08-31 PROCEDURE — 94669 MECHANICAL CHEST WALL OSCILL: CPT

## 2020-08-31 PROCEDURE — 94761 N-INVAS EAR/PLS OXIMETRY MLT: CPT

## 2020-08-31 PROCEDURE — 2700000000 HC OXYGEN THERAPY PER DAY

## 2020-08-31 PROCEDURE — 6370000000 HC RX 637 (ALT 250 FOR IP): Performed by: SURGERY

## 2020-08-31 PROCEDURE — 2580000003 HC RX 258: Performed by: SURGERY

## 2020-08-31 PROCEDURE — 94640 AIRWAY INHALATION TREATMENT: CPT

## 2020-08-31 RX ORDER — HYDROCODONE BITARTRATE AND ACETAMINOPHEN 5; 325 MG/1; MG/1
1 TABLET ORAL EVERY 4 HOURS PRN
Qty: 40 TABLET | Refills: 0 | Status: SHIPPED | OUTPATIENT
Start: 2020-08-31 | End: 2020-09-07

## 2020-08-31 RX ADMIN — QUETIAPINE FUMARATE 25 MG: 25 TABLET ORAL at 08:59

## 2020-08-31 RX ADMIN — DILTIAZEM HYDROCHLORIDE 360 MG: 120 CAPSULE, COATED, EXTENDED RELEASE ORAL at 09:00

## 2020-08-31 RX ADMIN — METOPROLOL TARTRATE 50 MG: 50 TABLET, FILM COATED ORAL at 09:00

## 2020-08-31 RX ADMIN — HYDROCODONE BITARTRATE AND ACETAMINOPHEN 2 TABLET: 5; 325 TABLET ORAL at 09:00

## 2020-08-31 RX ADMIN — ALLOPURINOL 300 MG: 300 TABLET ORAL at 08:59

## 2020-08-31 RX ADMIN — FOLIC ACID 1 MG: 1 TABLET ORAL at 08:59

## 2020-08-31 RX ADMIN — PANTOPRAZOLE SODIUM 40 MG: 40 TABLET, DELAYED RELEASE ORAL at 06:22

## 2020-08-31 RX ADMIN — TAMSULOSIN HYDROCHLORIDE 0.4 MG: 0.4 CAPSULE ORAL at 08:59

## 2020-08-31 RX ADMIN — Medication 10 ML: at 09:00

## 2020-08-31 RX ADMIN — Medication 100 MG: at 09:00

## 2020-08-31 RX ADMIN — ALBUTEROL SULFATE 2.5 MG: 2.5 SOLUTION RESPIRATORY (INHALATION) at 09:17

## 2020-08-31 ASSESSMENT — PAIN SCALES - GENERAL
PAINLEVEL_OUTOF10: 5
PAINLEVEL_OUTOF10: 5
PAINLEVEL_OUTOF10: 7
PAINLEVEL_OUTOF10: 7

## 2020-08-31 ASSESSMENT — PAIN DESCRIPTION - PAIN TYPE
TYPE: SURGICAL PAIN

## 2020-08-31 NOTE — DISCHARGE INSTR - COC
Continuity of Care Form    Patient Name: Elena Talamantes   :    MRN:  [id number]    Admit date:  2020  Discharge date:  2020    Code Status Order: Full Code   Advance Directives:   Advance Care Flowsheet Documentation     Date/Time Healthcare Directive Type of Healthcare Directive Copy in [facility] [address] Agent's Name Healthcare Agent's Phone Number    20 0604  No, patient does not have an advance directive for healthcare treatment -- -- -- -- --    20 1201  No, patient does not have an advance directive for healthcare treatment -- -- -- -- --          Admitting Physician:   Renetta Salomon MD  PCP: Bautista Worley MD    Discharging Nurse: [facility] Unit/Room#: 5826/8318-50  Discharging Unit Phone Number: ***    Emergency Contact:   Extended Emergency Contact Information  Primary Emergency Contact: Moe [place]rieville  [place] Phone: [phone]  Relation: Brother/Sister  Secondary Emergency Contact: Marry Kennedy  Address: [address] [place] [address] Phone: [phone]  Relation: Domestic Partner    Past Surgical History:  Past Surgical History:   Procedure Laterality Date    AORTO-ILIAC BYPASS GRAFT N/A 2020    AORTOBIFEMORAL BYPASS GRAFT performed by Renetta Salomon MD at [address] [place] [address]  2011    bronch with EBUS    COLONOSCOPY N/A 2018    COLONOSCOPY WITH BIOPSY performed by Humera Barrera MD at [address] [facility] COLONOSCOPY N/A 2018    COLONOSCOPY POLYPECTOMY SNARE/COLD BIOPSY performed by Humera Barrera MD at [facility]  2/4/15    Urethral Dilatation, Resection of Bladder Tumor    CYSTOSCOPY  2016    fulgeration of bladder tumor    ELBOW SURGERY Left     ENDOSCOPY, SMALL BOWEL N/A 2019    BOWEL SMALL CAPSULE ENDOSCOPY performed by Humera Barrera MD at [place] [address] HISTORY  05-    cystoscopy, bladder biopsy    OTHER SURGICAL HISTORY  09/09/2015    cystoscopy, urethral dilatation, bladder tumor follow up   [address] N/A 7/23/2018    EGD BIOPSY [place] at [address] [facility]       Immunization History:   Immunization History   Administered Date(s) Administered    Td, unspecified formulation 11/03/2014       Active Problems:  Patient Active Problem List   Diagnosis Code    Benign essential HTN I10    Hilar adenopathy R59.0    Tobacco abuse Z72.0    GERD (gastroesophageal reflux disease) K21.9    Chest pain R07.9    Gout M10.9    Paresthesia of bilateral legs R20.2    Right knee pain M25.561    Numbness and tingling of right leg R20.0, R20.2    Osteoarthritis of multiple joints M15.9    Supraclavicular fossa fullness R22.2    Carpal tunnel syndrome of left wrist G56.02    Alcohol abuse F10.10    Hyponatremia E87.1    Hepatomegaly R16.0    Chronic alcoholic hepatitis O00.17    Ulnar neuropathy at elbow G56.20    Chronic pain G89.29    Malignant neoplasm of urinary bladder (HCC) C67.9    Mixed simple and mucopurulent chronic bronchitis (HCC) J41.8    Ulnar nerve entrapment G56.20    Mixed hyperlipidemia E78.2    Bladder outlet obstruction N32.0    Diabetic ketoacidosis without coma associated with type 2 diabetes mellitus ([address] E11.10    Insulin dependent type 2 diabetes mellitus, uncontrolled ([facility] E11.65, Z79.4    Bronchiectasis without complication (HCC) N98.0    Onychomycosis B35.1    Tinea pedis of both feet B35.3    Chronic idiopathic constipation E24.75    Alcoholic cirrhosis of liver without ascites (HCC) K70.30    Family history of rectal cancer Z80.0    Rectal bleeding K62.5    Polyp of colon K63.5    Diverticulosis of intestine without bleeding K57.90    Secondary esophageal varices without bleeding (HCC) I85.10    Erosive gastritis K29.60    Multiple duodenal ulcers K29.81    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 ([facility] K75.81, K74.60    Iron deficiency anemia D50.9    AVM (arteriovenous malformation) of small bowel, acquired K55.20    Uncontrolled hypertension I10    Severe claudication (HCC) I73.9    Hypercalcemia E83.52    MICHAEL (acute kidney injury) ([facility] N17.9    PVD (peripheral vascular disease) (HCC) I73.9    Other constipation K59.09    Essential hypertension I10    Hypokalemia E87.6    Peripheral vascular disease, unspecified (HCC) I73.9       Isolation/Infection:   Isolation          No Isolation        Patient Infection Status     None to display          Nurse Assessment:  Last Vital Signs: BP (!) 159/70   Pulse 88   Temp 98.2 °F (36.8 °C)   Resp 18   Ht 5' 7\" (1.702 m)   Wt 210 lb 14.4 oz (95.7 kg)   SpO2 94%   BMI 33.03 kg/m²     Last documented pain score (0-10 scale): Pain Level: 7  Last Weight:   Wt Readings from Last 1 Encounters:   08/31/20 210 lb 14.4 oz (95.7 kg)     Mental Status:  oriented, alert, coherent, logical, thought processes intact and able to concentrate and follow conversation    IV Access:  - None    Nursing Mobility/ADLs:  Walking   Independent  Transfer  Independent  Bathing  Independent  Dressing  Independent  Toileting  Independent  Feeding  Independent  Med Admin  Independent  Med Delivery   none    Wound Care Documentation and Therapy:        Elimination:  Continence:   · Bowel: yes  · Bladder: yes  Urinary Catheter: None   Colostomy/Ileostomy/Ileal Conduit: no       Date of Last BM: ***    Intake/Output Summary (Last 24 hours) at 8/31/2020 0738  Last data filed at 8/31/2020 0641  Gross per 24 hour   Intake 870 ml   Output 350 ml   Net 520 ml     I/O last 3 completed shifts:   In: 929 [P.O.:870]  Out: 600 [Urine:600]    Safety Concerns:     None    Impairments/Disabilities:      None    Nutrition Therapy:  Current Nutrition Therapy:   - Oral Diet:  Carb Control 5 carbs/meal (2000kcals/day)    Routes of Feeding: Oral  Liquids: thin  Daily Fluid Restriction: no  Last Modified Barium Swallow with Video (Video Swallowing Test): not done    Treatments at the Time of Hospital Discharge:   Respiratory Treatments: ***  Oxygen Therapy:  is on oxygen at 2 L/min per nasal cannula. Ventilator:    - No ventilator support    Rehab Therapies: Nurse  Weight Bearing Status/Restrictions: No weight bearing restirctions  Other Medical Equipment (for information only, NOT a DME order):  {EQUIPMENT:[id number]}  Other Treatments: ***    Patient's personal belongings (please select all that are sent with patient):  None    RN SIGNATURE:  Electronically signed by Bailee Mejia RN on 8/31/20 at 10:15 AM EDT    CASE MANAGEMENT/SOCIAL WORK SECTION    Inpatient Status Date: 8/24/2020    Readmission Risk Assessment Score:  Readmission Risk              Risk of Unplanned Readmission:        26           Discharging to Facility/ Agency   · Name: [address]  · Address:  · Phone:[phone]   · Fax:    Dialysis Facility (if applicable)   · Name:  · Address:  · Dialysis Schedule:  · Phone:  · Fax:    / signature: Electronically signed by ROLA Lindsay, SRI on 8/31/20 at 10:39 AM EDT    PHYSICIAN SECTION    Prognosis: Good    Condition at Discharge: Stable    Rehab Potential (if transferring to Rehab): Good        Physician Certification: I certify the above information and transfer of Yvon Garnett  is necessary for the continuing treatment of the diagnosis listed and that he requires [address] for less 30 days.      Update Admission H&P: No change in H&P    PHYSICIAN SIGNATURE:  Electronically signed by Rosa Maria Fernando MD on 8/31/20 at 7:39 AM EDT

## 2020-08-31 NOTE — CARE COORDINATION
[facility]    Referral received from  to follow for home care services.    [facility] unable to staff timely  Referral made to [facility] declined    [facility] capacity declined referral    [facility] home care sent referral and [facility] unable to accept    [facility] declined    [facility] home care declined    [facility] home health declined    Care connection declined    Faith Samuel RN, BSN CTN  [facility] [phone],

## 2020-08-31 NOTE — PROGRESS NOTES
Vascular Surgery Progress Note      SUBJECTIVE:  No c/o.   +BM yesterday    OBJECTIVE    Physical  CURRENT VITALS:  BP (!) 159/70   Pulse 88   Temp 98.2 °F (36.8 °C)   Resp 18   Ht 5' 7\" (1.702 m)   Wt 210 lb 14.4 oz (95.7 kg)   SpO2 94%   BMI 33.03 kg/m²   24 HR INTAKE/OUTPUT:    Intake/Output Summary (Last 24 hours) at 8/31/2020 8264  Last data filed at 8/31/2020 4623  Gross per 24 hour   Intake 870 ml   Output 350 ml   Net 520 ml     Incisions intact after dressing removal  Palp pulses      ASSESSMENT AND PLAN    S/P ABF   Home today with home care

## 2020-08-31 NOTE — DISCHARGE SUMMARY
Physician Discharge Summary     Patient ID:  Liat Presley  [id number]  35 y.o.  1955    Admit date: 8/24/2020    Discharge date and time: 8/31/2020 11:51 AM     Admitting Physician: Chirag Swain MD     Discharge Physician: same    Admission Diagnoses: Peripheral vascular disease, unspecified ([facility] [I73.9]  Peripheral vascular disease, unspecified ([facility] [I73.9] secondary to AortoIliac occlusive disease. Discharge Diagnoses: same    Admission Condition: good    Discharged Condition: good    Indication for Admission: Admitted to undergo AortoBifemoral Bypass. Hospital Course: Admitted, taken to OR where above performed. Post-op course routine and unremarkable. Disposition: home    In process/preliminary results:  Outstanding Order Results     No orders found from 7/26/2020 to 8/25/2020. Patient Instructions:   Discharge Medication List as of 8/31/2020 12:25 PM      START taking these medications    Details   HYDROcodone-acetaminophen (NORCO) 5-325 MG per tablet Take 1 tablet by mouth every 4 hours as needed for Pain for up to 7 days. , Disp-40 tablet,R-0Normal         CONTINUE these medications which have NOT CHANGED    Details   insulin glargine (LANTUS SOLOSTAR) 100 UNIT/ML injection pen Inject 15 Units into the skin nightly As neededHistorical Med      INCRUSE ELLIPTA 62.5 MCG/INH AEPB INHALE 1 PUFF DAILY, Disp-1 each,R-11Normal      pantoprazole (PROTONIX) 40 MG tablet Take 1 tablet by mouth every morning (before breakfast), Disp-30 tablet, R-5Normal      gabapentin (NEURONTIN) 300 MG capsule Take 900 mg by mouth 3 times daily.  Historical Med      metoprolol succinate (TOPROL XL) 50 MG extended release tablet Take 1 tablet by mouth daily, Disp-30 tablet, R-11Normal      vitamin B-1 (THIAMINE) 100 MG tablet TAKE 1 TABLET BY MOUTH DAILY, Disp-30 tablet, R-11Normal      atorvastatin (LIPITOR) 40 MG tablet TAKE 1 TABLET BY MOUTH DAILY, Disp-30 tablet, R-11Normal      allopurinol MD  8/31/2020  2:29 PM

## 2020-08-31 NOTE — FLOWSHEET NOTE
08/30/20 2042   Vital Signs   Temp 99.7 °F (37.6 °C)   Pulse 77   Resp 18   /65   MAP (mmHg) 88   Level of Consciousness 0   MEWS Score 1   Pain Assessment   Pain Level 7   Pain Location Leg   Pain Orientation Right;Left   Pain Descriptors Tingling;Tender; Throbbing   Oxygen Therapy   SpO2 94 %   Shift assessment complete; see flow sheet. Scheduled and PRN medications administered; see MAR. Call light and bedside table within reach, bed in low, locked position, side rails up, pt encouraged to call for assistance with ambulation or transfer. Pt denies further needs at this time. Nurse and staff will continue to monitor throughout shift.

## 2020-08-31 NOTE — FLOWSHEET NOTE
08/31/20 1202   Encounter Summary   Services provided to: Patient   Referral/Consult From: [facility] Significant other;Family members   Place of Sikh no-den   Continue Visiting   (8/31-pt. being d/c today;sent him w/our prayers;grateful)   Complexity of Encounter Low   Length of Encounter 15 minutes   Routine   Type Initial   Assessment Approachable; Hopeful   Intervention Explored feelings, thoughts, concerns;[place]   Outcome Expressed gratitude

## 2020-08-31 NOTE — PROGRESS NOTES
Reviewed discharge instructions with patient, verbalized understanding. Also gave printed material on [place]o and caring for surgical incisions closed with staples. No additional questions at this time. IV removed with no complications. Telemetry monitor removed and returned, [facility] notified of discharge. Patient given one prescription for Norco. Belongings gathered and lockbox emptied. Patient escorted out via wheelchair. Writer assisted patient with scheduling a follow up appointment with Dr. Maddy Whittington this Friday at [phone].

## 2020-08-31 NOTE — CARE COORDINATION
CASE MANAGEMENT DISCHARGE SUMMARY      Discharge to: Home with Eliecer ordered/agency: New RW from [facility]     Transportation: Pt's friend   Family/car: Y     Confirmed discharge plan with:     Patient: yes     Family, name and contact number: N/A     Facility/Agency, name:  [facility] faxed     RN, name: Kattie Mcardle    Note: Discharging nurse to complete [facility], reconcile [facility], and place final copy with patient's discharge packet. SW has attempted a referral with [facility]- awaiting ability to accept or not.

## 2020-08-31 NOTE — TELEPHONE ENCOUNTER
Caro with [facility] called stating they'd received a referral for home care for pt from [facility], but it doesn't state what type of home care is needed. They are wanting to know if PCP would have recommendations and if he would sign home care orders.  Call back Juan Owen [phone]

## 2020-09-01 RX ORDER — ONDANSETRON 4 MG/1
4 TABLET, FILM COATED ORAL EVERY 6 HOURS PRN
Qty: 30 TABLET | Refills: 0 | Status: SHIPPED | OUTPATIENT
Start: 2020-09-01 | End: 2020-09-18 | Stop reason: SDUPTHER

## 2020-09-01 NOTE — TELEPHONE ENCOUNTER
Spoke to the Home health nurse [id number] Tafoya who said she will try to call him and she may just try stopping by and seeing if he will let her assess his surgical site. Advised her that he needs to keep the appt in Sept for us to sign the orders.

## 2020-09-01 NOTE — TELEPHONE ENCOUNTER
I assume it was ordered to keep an eye on his surgical site, but in the past the patient did not want home health due to his SO Tura Stagger not liking other people coming into the home. I have left a message for the patient to return our call re this.

## 2020-09-02 NOTE — TELEPHONE ENCOUNTER
----- Message from Abbey Ayala sent at 9/2/2020 10:17 AM EDT -----  Subject: Message to Provider    QUESTIONS  Information for Provider? pt was released from hospital and prescribed   hydrocodone   not sure if he can take it due to having liver problems. ---------------------------------------------------------------------------  --------------  Noah ABARCA  What is the best way for the office to contact you? OK to leave message on   voicemail  Preferred Call Back Phone Number? [phone]  ---------------------------------------------------------------------------  --------------  SCRIPT ANSWERS  Relationship to Patient?  Self

## 2020-09-04 ENCOUNTER — OFFICE VISIT (OUTPATIENT)
Dept: VASCULAR SURGERY | Age: 65
End: 2020-09-04

## 2020-09-04 VITALS
SYSTOLIC BLOOD PRESSURE: 160 MMHG | HEIGHT: 67 IN | WEIGHT: 217 LBS | BODY MASS INDEX: 34.06 KG/M2 | DIASTOLIC BLOOD PRESSURE: 60 MMHG

## 2020-09-04 PROCEDURE — 99024 POSTOP FOLLOW-UP VISIT: CPT | Performed by: SURGERY

## 2020-09-04 NOTE — PROGRESS NOTES
Outpatient Post-Op Visit  PATIENT NAME: Jana Weir     TODAY'S DATE: 9/4/2020    SUBJECTIVE:    Pt is seen in f/u after AortoBifemoral bypass. Denies leg pain. Bowels and bladder working. .     OBJECTIVE:   VITALS:  BP (!) 160/60 (Site: Right Upper Arm)   Ht 5' 7\" (1.702 m)   Wt 217 lb (98.4 kg)   BMI 33.99 kg/m²                   INCISIONs: clean, dry, no drainage, healed  Strong palpable PT pulses. JANNIE's:  1.0Bilateral        ASSESSMENT AND PLAN:  62 y.o. male status post ABF. Staples removed.    Encouraged smoking cessation  F/u prn     Eli Welch MD, FACS

## 2020-09-08 ENCOUNTER — OFFICE VISIT (OUTPATIENT)
Dept: FAMILY MEDICINE CLINIC | Age: 65
End: 2020-09-08
Payer: COMMERCIAL

## 2020-09-08 ENCOUNTER — HOSPITAL ENCOUNTER (OUTPATIENT)
Dept: CT IMAGING | Age: 65
Discharge: HOME | End: 2020-09-08
Payer: COMMERCIAL

## 2020-09-08 VITALS
HEART RATE: 86 BPM | WEIGHT: 210.2 LBS | DIASTOLIC BLOOD PRESSURE: 66 MMHG | TEMPERATURE: 97.9 F | OXYGEN SATURATION: 95 % | BODY MASS INDEX: 32.92 KG/M2 | SYSTOLIC BLOOD PRESSURE: 134 MMHG

## 2020-09-08 LAB
A/G RATIO: 1.1 (ref 1.1–2.2)
ALBUMIN SERPL-MCNC: 3.7 G/DL (ref 3.4–5)
ALP BLD-CCNC: 241 U/L (ref 40–129)
ALT SERPL-CCNC: 8 U/L (ref 10–40)
ANION GAP SERPL CALCULATED.3IONS-SCNC: 11 MMOL/L (ref 3–16)
AST SERPL-CCNC: 11 U/L (ref 15–37)
BILIRUB SERPL-MCNC: <0.2 MG/DL (ref 0–1)
BUN BLDV-MCNC: 8 MG/DL (ref 7–20)
CALCIUM SERPL-MCNC: 9.3 MG/DL (ref 8.3–10.6)
CHLORIDE BLD-SCNC: 100 MMOL/L (ref 99–110)
CO2: 29 MMOL/L (ref 21–32)
CREAT SERPL-MCNC: 0.7 MG/DL (ref 0.8–1.3)
GFR AFRICAN AMERICAN: >60
GFR NON-AFRICAN AMERICAN: >60
GLOBULIN: 3.4 G/DL
GLUCOSE BLD-MCNC: 121 MG/DL (ref 70–99)
MAGNESIUM: 1.8 MG/DL (ref 1.8–2.4)
POTASSIUM SERPL-SCNC: 3.9 MMOL/L (ref 3.5–5.1)
SODIUM BLD-SCNC: 140 MMOL/L (ref 136–145)
TOTAL PROTEIN: 7.1 G/DL (ref 6.4–8.2)

## 2020-09-08 PROCEDURE — G8427 DOCREV CUR MEDS BY ELIG CLIN: HCPCS | Performed by: FAMILY MEDICINE

## 2020-09-08 PROCEDURE — 74176 CT ABD & PELVIS W/O CONTRAST: CPT

## 2020-09-08 PROCEDURE — 1111F DSCHRG MED/CURRENT MED MERGE: CPT | Performed by: FAMILY MEDICINE

## 2020-09-08 PROCEDURE — G8417 CALC BMI ABV UP PARAM F/U: HCPCS | Performed by: FAMILY MEDICINE

## 2020-09-08 PROCEDURE — 99214 OFFICE O/P EST MOD 30 MIN: CPT | Performed by: FAMILY MEDICINE

## 2020-09-08 PROCEDURE — 3017F COLORECTAL CA SCREEN DOC REV: CPT | Performed by: FAMILY MEDICINE

## 2020-09-08 PROCEDURE — 2022F DILAT RTA XM EVC RTNOPTHY: CPT | Performed by: FAMILY MEDICINE

## 2020-09-08 PROCEDURE — 4004F PT TOBACCO SCREEN RCVD TLK: CPT | Performed by: FAMILY MEDICINE

## 2020-09-08 PROCEDURE — 3023F SPIROM DOC REV: CPT | Performed by: FAMILY MEDICINE

## 2020-09-08 PROCEDURE — G8926 SPIRO NO PERF OR DOC: HCPCS | Performed by: FAMILY MEDICINE

## 2020-09-08 PROCEDURE — 6360000004 HC RX CONTRAST MEDICATION: Performed by: FAMILY MEDICINE

## 2020-09-08 PROCEDURE — 3044F HG A1C LEVEL LT 7.0%: CPT | Performed by: FAMILY MEDICINE

## 2020-09-08 PROCEDURE — 36415 COLL VENOUS BLD VENIPUNCTURE: CPT | Performed by: FAMILY MEDICINE

## 2020-09-08 RX ORDER — HYDROCODONE BITARTRATE AND ACETAMINOPHEN 5; 325 MG/1; MG/1
1 TABLET ORAL EVERY 4 HOURS PRN
Qty: 30 TABLET | Refills: 0 | Status: SHIPPED | OUTPATIENT
Start: 2020-09-08 | End: 2020-09-13

## 2020-09-08 RX ADMIN — IOHEXOL 50 ML: 240 INJECTION, SOLUTION INTRATHECAL; INTRAVASCULAR; INTRAVENOUS; ORAL at 14:20

## 2020-09-08 NOTE — PROGRESS NOTES
Chief Complaint   Patient presents with    Hypertension    Hyperlipidemia    Diabetes    Gastroesophageal Reflux    Other     patient has multiple medical complaints   He is having pain on both sides and wraps around to the center of his back. He states his surgical incision is not hurting as much as his sides. He states his bowels are moving OK on the Norco, better than when he takes the oxycodone. He states he is having a lot of gas. He states he had 2 bowel movements this AM and that made his sides hurt more. Advised patient to notify the pain doctor and or the surgeon re the pain medications and see if they will order more of the Corrie Kirks.  Discussed his hypercalcemia due to increased antacids and metformin. He asked about taking his Lantus if his sugar is under 120, advised he should go ahead and take the Lantus. HPI:  Armando Erazo is a 62 y.o. (: 1955) here today for  Treatment Adherence:   Medication compliance:  compliant all of the time  Diet compliance:  noncompliant: eats what he wants  Weight trend: decreasing  Current exercise: no regular exercise  Barriers: impairment:  physical: back pain    Diabetes Mellitus Type 2: Current symptoms/problems include none. Home blood sugar records: fasting range: , patient tests 2 time(s) per day  Any episodes of hypoglycemia? no  Eye exam current (within one year): yes  Tobacco history: He  reports that he has been smoking cigarettes and cigars. He started smoking about 2 years ago. He has a 27.00 pack-year smoking history. He has quit using smokeless tobacco.  His smokeless tobacco use included snuff. Daily Aspirin? No: was told not to    Hypertension:  Home blood pressure monitoring: Yes - runs pretty good. He is adherent to a low sodium diet. Patient denies chest pain. Antihypertensive medication side effects: no medication side effects noted. Use of agents associated with hypertension: none.      Hyperlipidemia:  No new myalgias or GI upset on atorvastatin (Lipitor). Lab Results   Component Value Date    LABA1C 6.6 08/03/2020    LABA1C 6.0 01/24/2020    LABA1C 6.5 11/12/2019     Lab Results   Component Value Date    LABMICR Not Indicated 08/03/2020    CREATININE 0.7 (L) 08/27/2020     Lab Results   Component Value Date    ALT 19 08/03/2020    AST 20 08/03/2020     Lab Results   Component Value Date    CHOL 173 03/08/2019    TRIG 177 (H) 03/08/2019    HDL 32 (L) 03/08/2019    LDLCALC 106 (H) 03/08/2019          Patient's medications, allergies, past medical, surgical, social and family histories were reviewed and updated asappropriate on 9/8/2020 at 9:57 AM.    ROS:  Review of Systems    All other systems reviewed and are negative except as noted above on 9/8/2020 at 9:57 AM. Additional review of systems may be scanned into the media section ofthis medical record. Any responses requiring further intervention were pursued.     Hemoglobin A1C (%)   Date Value   08/03/2020 6.6     Microscopic Examination (no units)   Date Value   08/03/2020 Not Indicated     LDL Calculated (mg/dL)   Date Value   03/08/2019 106 (H)     Past Medical History:   Diagnosis Date    Bronchitis chronic     Chest pain     Chronic cough     Cirrhosis of liver ([facility] 01/01/2017    stage 4     COPD (chronic obstructive pulmonary disease) (HCC)     COPD (chronic obstructive pulmonary disease) (HCC)     Diabetes mellitus (HCC)     Gout     Hilar adenopathy     Hyperlipidemia     Hypertension     Knee pain, right     Numbness and tingling of leg     Osteoarthritis     Paresthesia of bilateral legs     PVD (peripheral vascular disease) (HCC)     Seizures (HCC)     ongoing, began after swine flu vaccination    Substance abuse ([facility]         Family History   Problem Relation Age of Onset    Cancer Mother         Lung    Emphysema Father     Diabetes Sister     Hypertension Sister     Cancer Brother         throat    Asthma Neg Hx      Social History     Socioeconomic History    Marital status:      Spouse name: Not on file    Number of children: Not on file    Years of education: Not on file    Highest education level: Not on file   Occupational History    Occupation: jonn[employer]     Comment: not working currently   Social Needs    Financial resource strain: Not on file    Food insecurity     Worry: Not on file     Inability: Not on file   [employer] needs     Medical: Not on file     Non-medical: Not on file   Tobacco Use    Smoking status: Current Every Day Smoker     Packs/day: 0.50     Years: 54.00     Pack years: 27.00     Types: Cigarettes, Cigars     Start date: 2018     Last attempt to quit: 2020     Years since quittin.1    Smokeless tobacco: Former User     Types: Snuff    Tobacco comment: less than 1/2 pk   Substance and Sexual Activity    Alcohol use: Not Currently     Alcohol/week: 2.0 standard drinks     Types: 2 Cans of beer per week     Frequency: Monthly or less    Drug use: No    Sexual activity: Yes     Partners: Female   Lifestyle    Physical activity     Days per week: Not on file     Minutes per session: Not on file    Stress: Not on file   Relationships    Social connections     Talks on phone: Not on file     Gets together: Not on file     Attends Buddhist service: Not on file     Active member of club or organization: Not on file     Attends meetings of clubs or organizations: Not on file     Relationship status: Not on file    Intimate partner violence     Fear of current or ex partner: Not on file     Emotionally abused: Not on file     Physically abused: Not on file     Forced sexual activity: Not on file   Other Topics Concern    Not on file   Social History Narrative    Not on file       Prior to Visit Medications    Medication Sig Taking?  Authorizing Provider   ondansetron (ZOFRAN) 4 MG tablet Take 1 tablet by mouth every 6 hours as needed for Nausea or Vomiting Yes Lilly Hammans Janet Alicia MD   insulin glargine (LANTUS SOLOSTAR) 100 UNIT/ML injection pen Inject 15 Units into the skin nightly As needed Yes Historical Provider, MD   naloxone (NARCAN) 4 MG/0.1ML LIQD nasal spray 1 spray by Nasal route as needed for Opioid Reversal May repeat after 3 minutes if no or minimal response as directed Yes Historical Provider, MD   Rebarnaldo Cockayne 62.5 MCG/INH AEPB INHALE 1 PUFF DAILY Yes Larissa Alarcon MD   pantoprazole (PROTONIX) 40 MG tablet Take 1 tablet by mouth every morning (before breakfast) Yes MD FOREIGN CantrellSTYLE LITE strip USE 1 STRIP IN VITRO ROUTE 2 TIMES DAILY Yes Larissa Alarcon MD   Alcohol Swabs (ALCOHOL PREP) 70 % PADS USE AS DIRECTED Yes Larissa Alarcon MD   gabapentin (NEURONTIN) 300 MG capsule Take 900 mg by mouth 3 times daily.   Yes Historical Provider, MD   metoprolol succinate (TOPROL XL) 50 MG extended release tablet Take 1 tablet by mouth daily Yes Larissa Alarcon MD   vitamin B-1 (THIAMINE) 100 MG tablet TAKE 1 TABLET BY MOUTH DAILY Yes Larissa Alarcon MD   atorvastatin (LIPITOR) 40 MG tablet TAKE 1 TABLET BY MOUTH DAILY  Patient taking differently: Take 40 mg by mouth daily  Yes Larissa Alarcon MD   allopurinol (ZYLOPRIM) 300 MG tablet TAKE 1 TABLET BY MOUTH DAILY  Patient taking differently: Take 300 mg by mouth daily  Yes MD JS Cantrell LANCETS MISC TEST 2 TIMES DAILY Yes Larissa Alarcon MD   albuterol sulfate  (90 Base) MCG/ACT inhaler INHALE 2 PUFFS EVERY 6 HOURS AS NEEDED FOR WHEEZING Yes Larissa Alarcon MD   diltiazem DE LA O [facility]) 360 MG extended release capsule TAKE 1 CAPSULE BY MOUTH DAILY  Patient taking differently: Take 360 mg by mouth daily TAKE 1 CAPSULE BY MOUTH DAILY Yes Larissa Alarcon MD   folic acid (FOLVITE) 1 MG tablet TAKE 1 TABLET BY MOUTH DAILY  Patient taking differently: 1 mg daily  Yes Jane Dash Brittney Farfan MD   methocarbamol (ROBAXIN) 750 MG tablet TAKE ONE TABLET BY MOUTH FOUR TIMES A DAY AS NEEDED FOR MUSCLE SPASMS  Patient taking differently: Take 750 mg by mouth 4 times daily as needed  Yes Lizzie Gonzalez MD   Blood Pressure Monitoring (BLOOD PRESSURE CUFF) MISC Please dispense insurance preference Yes Lizzie Gonzalez MD   budesonide (PULMICORT) 0.5 MG/2ML nebulizer suspension Take 2 mLs by nebulization 2 times daily Yes Lizzie Gonzalez MD   UNIFINE PENTIPS 31G X 8 MM MISC USE ONCE DAILY WITH INJECTION Yes Lizzie Gonzalez MD   Blood Glucose Monitoring Suppl (FREESTYLE FREEDOM LITE) w/Device KIT 1 kit by Does not apply route daily Yes Dia Fabry, APRN - CNP   OXYGEN Inhale 2 L/min into the lungs nightly as needed (and daily prn)  Yes Historical Provider, MD     Allergies   Allergen Reactions    Lisinopril Swelling    Ace Inhibitors Swelling    Influenza Vaccines      He states he was hospitalized when he received his first flu vaccine    Spiriva Handihaler [Tiotropium Bromide Monohydrate] Swelling     Tolerates both tudorza and incruse       OBJECTIVE:  Estimated body mass index is 32.92 kg/m² as calculated from the following:    Height as of 9/4/20: 5' 7\" (1.702 m). Weight as of this encounter: 210 lb 3.2 oz (95.3 kg). Vitals:    09/08/20 0953   BP: (!) 142/60   Site: Left Upper Arm   Position: Sitting   Cuff Size: Medium Adult   Pulse: 86   Temp: 97.9 °F (36.6 °C)   TempSrc: Temporal   SpO2: 95%   Weight: 210 lb 3.2 oz (95.3 kg)     BP Readings from Last 2 Encounters:   09/08/20 (!) 142/60   09/04/20 (!) 160/60     Wt Readings from Last 3 Encounters:   09/08/20 210 lb 3.2 oz (95.3 kg)   09/04/20 217 lb (98.4 kg)   08/31/20 210 lb 14.4 oz (95.7 kg)       Physical Exam  Vitals signs and nursing note reviewed. Constitutional:       General: He is not in acute distress. Appearance: He is well-developed. He is not diaphoretic.    HENT: Head: Normocephalic and atraumatic. Right Ear: External ear normal.      Left Ear: External ear normal.      Nose: Nose normal.   Eyes:      General: Lids are normal. No scleral icterus. Right eye: No discharge. Left eye: No discharge. Pupils: Pupils are equal, round, and reactive to light. Neck:      Thyroid: No thyromegaly. Vascular: No JVD. Cardiovascular:      Rate and Rhythm: Normal rate and regular rhythm. Pulmonary:      Effort: Pulmonary effort is normal. No respiratory distress. Comments: Diminished breath sounds through out  Abdominal:      General: Bowel sounds are decreased. There is distension. Palpations: Abdomen is soft. There is no hepatomegaly or splenomegaly. Tenderness: There is abdominal tenderness in the right lower quadrant and left lower quadrant. Comments: Diminished bowel sounds; tender RLQ and LLQ. Also tender right and left flank to palpation. Dull to percussion   Skin:     General: Skin is warm and dry. Coloration: Skin is not pale. Findings: No erythema or rash. Comments: Turgor normal   Psychiatric:         Behavior: Behavior normal.         Thought Content: Thought content normal.         Judgment: Judgment normal.              ASSESSMENT PLAN      Diagnosis Orders   1. RLQ abdominal pain  CT ABDOMEN PELVIS WO CONTRAST Additional Contrast? Oral   2. LLQ pain  CT ABDOMEN PELVIS WO CONTRAST Additional Contrast? Oral   3. Alcoholic cirrhosis of liver without ascites (HCC)  COMPREHENSIVE METABOLIC PANEL    MAGNESIUM   4. Essential hypertension  COMPREHENSIVE METABOLIC PANEL    MAGNESIUM   5. Benign essential HTN     6. Tobacco abuse     7. Gastroesophageal reflux disease without esophagitis     8. Mixed simple and mucopurulent chronic bronchitis ([address]     9. Insulin dependent type 2 diabetes mellitus, uncontrolled ([address]     10. Chronic idiopathic constipation     11.  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 ([address]     I am not sure if this patient's pain is related to incomplete bowel evacuation although he states he has had 2 bowel movements today and he thinks the pain is worse with a bowel movement. I do not know whether that is from bearing down. The mid abdominal incision looks fine. My concern would be for inflammation postop and therefore think we need to proceed with a CT scan. He continues GI follow-up elsewhere for cirrhosis. His blood pressure is acceptable. I have asked him to use his Lantus regularly. Reflux controlled. Breathing at baseline. Regular follow-up 6 months. Patient should call the office immediately with new or ongoing signs or symptoms or worsening, or proceed to the emergency room. No changes in past medical history, past surgical history, social history, or family history were noted during the patient encounter unless specifically listed above. All updates of past medical history, past surgical history, social history, or family history were reviewed personally by me during the office visit. All problems listed in the assessment are stable unless noted otherwise. Medication profile reviewed personally by me during the visit. Medication side effects and possible impairments from medications were discussed as applicable. This document was prepared by a combination of typing and transcription through a voice recognition software. Scribe attestation: Rayne Ortez RN, am scribing for and in the presence of Tk Ochoa MD. Electronically signed by Darwin Clark RN on 9/8/2020 at 9:57 AM      Provider attestation:     I, Dr. Rachele Ponce, personally performed the services described in this documentation, as scribed by the above signed scribe in my presence, and it is both accurate and complete.  I agree with the ROS and Past Histories independently gathered by the clinical support staff and the remaining scribed note accurately describes my personal service to the

## 2020-09-14 ENCOUNTER — TELEPHONE (OUTPATIENT)
Dept: VASCULAR SURGERY | Age: 65
End: 2020-09-14

## 2020-09-14 NOTE — TELEPHONE ENCOUNTER
Called patient with date and time of his cta abdomin/pelvis at [facility] as well as needs cbc. Scheduled Tuesday 9/22/20 at 9:00am arrival . Npo 4 hours prior. valentine

## 2020-09-14 NOTE — TELEPHONE ENCOUNTER
Patient called stating that he has had problems with pain in his back and cramping in abdomin after bowel movement. He had aortobifem done on 8/25/20. He is on regular pain meds through pain management for his knee. I asked him if has hard time going as in constipation. He says some as has to grunt when goes. I explained to him with pain meds causes constipation. He is concerned pain coming from aortobifem gaitan rgery. I told him I doubt this but will check with Dr Fiona Maynard.  valentine

## 2020-09-17 NOTE — TELEPHONE ENCOUNTER
Last appt 9/8/2020. Pt only has 3 tablets left. States he's been taking about 2 tablets daily. Sig is for every 6 hours PRN, Qty 30, this may need to be adjusted. Please advise.

## 2020-09-18 RX ORDER — ONDANSETRON 4 MG/1
4 TABLET, FILM COATED ORAL EVERY 6 HOURS PRN
Qty: 30 TABLET | Refills: 0 | Status: SHIPPED | OUTPATIENT
Start: 2020-09-18 | End: 2020-11-18 | Stop reason: SDUPTHER

## 2020-09-21 ENCOUNTER — TELEPHONE (OUTPATIENT)
Dept: VASCULAR SURGERY | Age: 65
End: 2020-09-21

## 2020-09-21 RX ORDER — HYDROCODONE BITARTRATE AND ACETAMINOPHEN 5; 325 MG/1; MG/1
1 TABLET ORAL EVERY 4 HOURS PRN
Qty: 42 TABLET | Refills: 0 | Status: SHIPPED | OUTPATIENT
Start: 2020-09-21 | End: 2020-09-28

## 2020-09-21 NOTE — TELEPHONE ENCOUNTER
Patient is calling stating that still having pain in abdomin/side/and mid spine. He says has enough pain pills until day after tomorrow. He goes to pain management Sept 31. If can have enough until than. He is getting his cta abdomin/pelivis tomorrow .  pammalvin

## 2020-09-22 ENCOUNTER — HOSPITAL ENCOUNTER (OUTPATIENT)
Age: 65
Discharge: HOME | End: 2020-09-22
Payer: COMMERCIAL

## 2020-09-22 ENCOUNTER — HOSPITAL ENCOUNTER (OUTPATIENT)
Dept: CT IMAGING | Age: 65
Discharge: HOME | End: 2020-09-22
Payer: COMMERCIAL

## 2020-09-22 LAB
ANION GAP SERPL CALCULATED.3IONS-SCNC: 10 MMOL/L (ref 3–16)
BASOPHILS ABSOLUTE: 0.1 K/UL (ref 0–0.2)
BASOPHILS RELATIVE PERCENT: 1.1 %
BUN BLDV-MCNC: 8 MG/DL (ref 7–20)
CALCIUM SERPL-MCNC: 9.7 MG/DL (ref 8.3–10.6)
CHLORIDE BLD-SCNC: 100 MMOL/L (ref 99–110)
CO2: 28 MMOL/L (ref 21–32)
CREAT SERPL-MCNC: 0.8 MG/DL (ref 0.8–1.3)
EOSINOPHILS ABSOLUTE: 0.4 K/UL (ref 0–0.6)
EOSINOPHILS RELATIVE PERCENT: 3.7 %
GFR AFRICAN AMERICAN: >60
GFR NON-AFRICAN AMERICAN: >60
GLUCOSE BLD-MCNC: 111 MG/DL (ref 70–99)
HCT VFR BLD CALC: 31.9 % (ref 40.5–52.5)
HEMOGLOBIN: 9.7 G/DL (ref 13.5–17.5)
LYMPHOCYTES ABSOLUTE: 2 K/UL (ref 1–5.1)
LYMPHOCYTES RELATIVE PERCENT: 20.2 %
MCH RBC QN AUTO: 21.9 PG (ref 26–34)
MCHC RBC AUTO-ENTMCNC: 30.3 G/DL (ref 31–36)
MCV RBC AUTO: 72.4 FL (ref 80–100)
MONOCYTES ABSOLUTE: 0.7 K/UL (ref 0–1.3)
MONOCYTES RELATIVE PERCENT: 7.7 %
NEUTROPHILS ABSOLUTE: 6.5 K/UL (ref 1.7–7.7)
NEUTROPHILS RELATIVE PERCENT: 67.3 %
PDW BLD-RTO: 21.1 % (ref 12.4–15.4)
PLATELET # BLD: 451 K/UL (ref 135–450)
PMV BLD AUTO: 7.4 FL (ref 5–10.5)
POTASSIUM SERPL-SCNC: 4.1 MMOL/L (ref 3.5–5.1)
RBC # BLD: 4.41 M/UL (ref 4.2–5.9)
SODIUM BLD-SCNC: 138 MMOL/L (ref 136–145)
WBC # BLD: 9.7 K/UL (ref 4–11)

## 2020-09-22 PROCEDURE — 85025 COMPLETE CBC W/AUTO DIFF WBC: CPT

## 2020-09-22 PROCEDURE — 80048 BASIC METABOLIC PNL TOTAL CA: CPT

## 2020-09-22 PROCEDURE — 36415 COLL VENOUS BLD VENIPUNCTURE: CPT

## 2020-09-22 PROCEDURE — 74174 CTA ABD&PLVS W/CONTRAST: CPT

## 2020-09-22 PROCEDURE — 6360000004 HC RX CONTRAST MEDICATION: Performed by: SURGERY

## 2020-09-22 RX ADMIN — IOPAMIDOL 85 ML: 755 INJECTION, SOLUTION INTRAVENOUS at 09:35

## 2020-09-23 ENCOUNTER — TELEPHONE (OUTPATIENT)
Dept: VASCULAR SURGERY | Age: 65
End: 2020-09-23

## 2020-09-28 ENCOUNTER — TELEPHONE (OUTPATIENT)
Dept: PULMONOLOGY | Age: 65
End: 2020-09-28

## 2020-09-28 NOTE — TELEPHONE ENCOUNTER
Within this Telehealth Consent, the terms you and yours refer to the person using the Telehealth Service (Service), or in the case of a use of the Service by or on behalf of a minor, you and yours refer to and include (i) the parent or legal guardian who provides consent to the use of the Service by such minor or uses the Service on behalf of such minor, and (ii) the minor for whom consent is being provided or on whose behalf the Service is being utilized. When using Service, you will be consulting with your health care providers via the use of Telehealth.   Telehealth involves the delivery of healthcare services using electronic communications, information technology or other means between a healthcare provider and a patient who are not in the same physical location. Telehealth may be used for diagnosis, treatment, follow-up and/or patient education, and may include, but is not limited to, one or more of the following:    Electronic transmission of medical records, photo images, personal health information or other data between a patient and a healthcare provider    Interactions between a patient and healthcare provider via audio, video and/or data communications    Use of output data from medical devices, sound and video files    Anticipated Benefits   The use of Telehealth by your Provider(s) through the Service may have the following possible benefits:    Making it easier and more efficient for you to access medical care and treatment for the conditions treated by such Provider(s) utilizing the Service    Allowing you to obtain medical care and treatment by Provider(s) at times that are convenient for you    Enabling you to interact with Provider(s) without the necessity of an in-office appointment     Possible Risks   While the use of Telehealth can provide potential benefits for you, there are also potential risks associated with the use of Telehealth.  These risks include, but may not be limited to the following:    Your Provider(s) may not able to provide medical treatment for your particular condition and you may be required to seek alternative healthcare or emergency care services.  The electronic systems or other security protocols or safeguards used in the Service could fail, causing a breach of privacy of your medical or other information.  Given regulatory requirements in certain jurisdictions, your Provider(s) diagnosis and/or treatment options, especially pertaining to certain prescriptions, may be limited. Acceptance   1. You understand that Services will be provided via Telehealth. This process involves the use of HIPAA compliant and secure, real-time audio-visual interfacing with a qualified and appropriately trained provider located at [facility]. 2. You understand that, under no circumstances, will this session be recorded. 3. You understand that the Provider(s) at [facility] and other clinical participants will be party to the information obtained during the Telehealth session in accordance with best medical practices. 4. You understand that the information obtained during the Telehealth session will be used to help determine the most appropriate treatment options. 5. You understand that You have the right to revoke this consent at any point in time. 6. You understand that Telehealth is voluntary, and that continued treatment is not dependent upon consent. 7. You understand that, in the event of non-consent to Telehealth services and/or technical difficulties, you will obtain services as typically provided in the absence of Telehealth technology. 8. You understand that this consent will be kept in Your medical record. 9. No potential benefits from the use of Telehealth or specific results can be guaranteed. Your condition may not be cured or improved and, in some cases, may get worse.    10. There are limitations in the provision of medical care and treatment via Telehealth and the Service and you may not be able to receive diagnosis and/or treatment through the Service for every condition for which you seek diagnosis and/or treatment. 11. There are potential risks to the use of Telehealth, including but not limited to the risks described in this Telehealth Consent. 12. Your Provider(s) have discussed the use of Telehealth and the Service with you, including the benefits and risks of such and you have provided oral consent to your Provider(s) for the use of Telehealth and the Service. 15. You understand that it is your duty to provide your Provider(s) truthful, accurate and complete information, including all relevant information regarding care that you may have received or may be receiving from other healthcare providers outside of the Service. 14. You understand that each of your Provider(s) may determine in his or sole discretion that your condition is not suitable for diagnosis and/or treatment using the Service, and that you may need to seek medical care and treatment a specialist or other healthcare provider, outside of the Service. 15. You understand that you are fully responsible for payment for all services provided by Provider(s) or through use of the Service and that you may not be able to use third-party insurance. 16. You represent that (a) you have read this Telehealth Consent carefully, (b) you understand the risks and benefits of the Service and the use of Telehealth in the medical care and treatment provided to you by Provider(s) using the Service, and (c) you have the legal capacity and authority to provide this consent for yourself and/or the minor for which you are consenting under applicable federal and state laws, including laws relating to the age of [de-identified] and/or parental/guardian consent.    17. You give your informed consent to the use of Telehealth by Provider(s) using the Service under the terms described in the Terms of Service and this Telehealth Consent. The patient was read the following statement and has consented to the visit as of 9/28/20. The patient has been scheduled for their first telehealth visit on 10/7/20 with Dr Leanna Dakins.

## 2020-10-07 ENCOUNTER — VIRTUAL VISIT (OUTPATIENT)
Dept: PULMONOLOGY | Age: 65
End: 2020-10-07
Payer: COMMERCIAL

## 2020-10-07 PROCEDURE — 99442 PR PHYS/QHP TELEPHONE EVALUATION 11-20 MIN: CPT | Performed by: INTERNAL MEDICINE

## 2020-10-07 RX ORDER — OXYCODONE HYDROCHLORIDE 5 MG/1
10 TABLET ORAL EVERY 8 HOURS PRN
COMMUNITY

## 2020-10-07 NOTE — PROGRESS NOTES
Chief Complaint/Referring Provider:  Patient is being seen at the request of Ralph Romeo for a consultation for COPD     Maria De Jesus Arriola is a 62 y.o. male evaluated via telephone on 10/7/2020. Consent: He and/or health care decision maker is aware that that he may receive a bill for this telephone service, depending on his insurance coverage, and has provided verbal consent to proceed: YES  I communicated with the patient and/or health care decision maker about: see interval history below. Details of this discussion including any medical advice provided: see plan below  I affirm this is a Patient Initiated Episode with a Patient who has not had a related appointment within my department in the past 7 days or scheduled within the next 24 hours. Patient identification was verified at the start of the visit: YES  Total Time: minutes: 11-20 minutes     Interval History:     shortness of breath has been pretty good - uses proair and incruse. Tobacco is at about 10 per day. Has \"tried about everything\" to quit, now precontemplative    Had vascular surgery and symptoms persists unfortunately. Plan is for f/u with vascular. Presenting HPI: This is a 66 yo WM seen by me 7 years ago for hilar lymphadenopathy, had EBUS which was reassuring, now here with with a four-week history of increased severe dyspnea worse with exertion associated with significant cough which is minimally productive, admitted to [facility] on 1/9/18 and treated for an acute exacerbation of COPD. He had minimal improvement in the hospital.  However he saw his primary care provider and was prescribed steroids and antibiotic and did notice some improvement although he tells me he is now somewhat worse again and he is definitely nowhere near his baseline of last December.   His baseline and he does have some shortness of breath although it's only with significant exertion and was not troubling to him in the past.       reports that he has been smoking cigarettes and cigars. He started smoking about 2 years ago. He has a 27.00 pack-year smoking history. He has quit using smokeless tobacco.  His smokeless tobacco use included snuff. On average > 1-2 ppd    family history includes Cancer in his brother and mother; Diabetes in his sister; Emphysema in his father; Hypertension in his sister. Physical Exam:  There were no vitals taken for this visit.'  PHONE    Data  LDCT 2018  Impression    Stable tiny punctate pulmonary nodules      L Hilar TBNA 2-8-11 - no malignant cells; FLOW normal polyclonal B/T cells    PFT 2-10-11 FEV1 2L 62% % DLCO 100%    Assessment:  · Moderate COPD on last PFT in 2011   · Peripheral vascular disease s/p bypass surgery  · Obesity  · Lower lobe bronchiectasis on abdominal CT   · Cirrhosis  · Tobacco abuse, 1-3 ppd X 42 years, average about 2 ppd for 80 pack year hx, now 1/2 ppd    Plan:   · Incruse daily, did not tolerate Anoro  · PRN albuterol  · LDCT for LCS now, can set up for next week   · 6 month f/u with me     Screening CT scan was considered in a lung cancer screening counseling and shared decision making visit today that included the following elements:    Eligibility: Age: 62.  There are no signs or symptoms of lung cancer.   Tobacco History 80 pack-years, quit zero years ago   Verbal counseling has been performed by me to include benefits and harms of screening, follow-up diagnostic testing, over-diagnosis, false positive rate, and total radiation exposure;    I have counseled on the importance of adherence to annual lung cancer LDCT screening, the impact of comorbidities and patient is willing to undergo diagnosis and treatment;    I have provided counseling on the importance of maintaining cigarette smoking abstinence if former smoker; or the importance of smoking cessation if current smoker and, if appropriate, furnishing of information about tobacco cessation interventions; and    I

## 2020-10-09 ENCOUNTER — HOSPITAL ENCOUNTER (OUTPATIENT)
Dept: VASCULAR LAB | Age: 65
Discharge: HOME | End: 2020-10-09
Payer: COMMERCIAL

## 2020-10-09 PROCEDURE — 93926 LOWER EXTREMITY STUDY: CPT

## 2020-10-12 ENCOUNTER — TELEPHONE (OUTPATIENT)
Dept: VASCULAR SURGERY | Age: 65
End: 2020-10-12

## 2020-10-12 NOTE — TELEPHONE ENCOUNTER
Called patient with results per Dr Kathy Scanlon. Looks good .  His tingling in leg is not arterial .valentine

## 2020-10-15 ENCOUNTER — HOSPITAL ENCOUNTER (OUTPATIENT)
Dept: CT IMAGING | Age: 65
Discharge: HOME | End: 2020-10-15
Payer: COMMERCIAL

## 2020-10-15 PROCEDURE — G0297 LDCT FOR LUNG CA SCREEN: HCPCS

## 2020-10-28 RX ORDER — PANTOPRAZOLE SODIUM 40 MG/1
40 TABLET, DELAYED RELEASE ORAL
Qty: 30 TABLET | Refills: 0 | Status: SHIPPED | OUTPATIENT
Start: 2020-10-28 | End: 2020-11-30

## 2020-11-03 PROBLEM — I73.9 PVD (PERIPHERAL VASCULAR DISEASE) (HCC): Status: RESOLVED | Noted: 2020-11-03 | Resolved: 2020-11-03

## 2020-11-03 PROBLEM — I10 ESSENTIAL HYPERTENSION: Status: RESOLVED | Noted: 2020-11-03 | Resolved: 2020-11-03

## 2020-11-03 PROBLEM — I10 BENIGN ESSENTIAL HTN: Status: RESOLVED | Noted: 2020-11-03 | Resolved: 2020-11-03

## 2020-11-03 PROBLEM — I73.9 PERIPHERAL VASCULAR DISEASE, UNSPECIFIED (HCC): Status: RESOLVED | Noted: 2020-08-24 | Resolved: 2020-11-03

## 2020-11-18 RX ORDER — ONDANSETRON 4 MG/1
4 TABLET, FILM COATED ORAL EVERY 6 HOURS PRN
Qty: 30 TABLET | Refills: 0 | Status: SHIPPED | OUTPATIENT
Start: 2020-11-18 | End: 2021-01-06

## 2020-11-20 ENCOUNTER — TELEPHONE (OUTPATIENT)
Dept: FAMILY MEDICINE CLINIC | Age: 65
End: 2020-11-20

## 2020-11-20 NOTE — TELEPHONE ENCOUNTER
----- Message from Chrissie Mc sent at 11/20/2020 11:47 AM EST -----  Subject: Referral Request    QUESTIONS   Reason for referral request? Pt needs a referral to see Dr. Mendy Billings   (knee doctor) Pt was supposed to have surgery on his knee a few years ago   and wants to see him about that   Has the physician seen you for this condition before? No   Preferred Specialist (if applicable)? Do you already have an appointment scheduled? No  Additional Information for Provider?   ---------------------------------------------------------------------------  --------------  CALL BACK INFO  What is the best way for the office to contact you?  OK to leave message on   voicemail  Preferred Call Back Phone Number? [phone]

## 2020-11-30 RX ORDER — PANTOPRAZOLE SODIUM 40 MG/1
TABLET, DELAYED RELEASE ORAL
Qty: 30 TABLET | Refills: 0 | Status: SHIPPED | OUTPATIENT
Start: 2020-11-30 | End: 2020-12-29

## 2020-12-07 RX ORDER — DILTIAZEM HYDROCHLORIDE 360 MG/1
CAPSULE, EXTENDED RELEASE ORAL
Qty: 30 CAPSULE | Refills: 0 | Status: SHIPPED | OUTPATIENT
Start: 2020-12-07 | End: 2021-01-06

## 2020-12-10 ENCOUNTER — OFFICE VISIT (OUTPATIENT)
Dept: ORTHOPEDIC SURGERY | Age: 65
End: 2020-12-10
Payer: COMMERCIAL

## 2020-12-10 VITALS — WEIGHT: 210 LBS | BODY MASS INDEX: 32.96 KG/M2 | HEIGHT: 67 IN

## 2020-12-10 PROBLEM — M17.11 PRIMARY OSTEOARTHRITIS OF RIGHT KNEE: Status: ACTIVE | Noted: 2020-12-10

## 2020-12-10 PROCEDURE — 99243 OFF/OP CNSLTJ NEW/EST LOW 30: CPT | Performed by: ORTHOPAEDIC SURGERY

## 2020-12-10 NOTE — PROGRESS NOTES
Gout     Hilar adenopathy     Hyperlipidemia     Hypertension     Knee pain, right     Numbness and tingling of leg     Osteoarthritis     Paresthesia of bilateral legs     PVD [facility]peripheral vascular disease) ([facility])     Seizures ([facility])     ongoing, began after swine flu vaccination    Substance abuse ([facility])        Allergies    Allergies   Allergen Reactions    Lisinopril Swelling    Ace Inhibitors Swelling    Influenza Vaccines      He states he was hospitalized when he received his first flu vaccine    Spiriva Handihaler [Tiotropium Bromide Monohydrate] Swelling     Tolerates both tudorza and incruse       Meds    Current Outpatient Medications   Medication Sig Dispense Refill    dilTIAZem (TIAZAC) 360 MG extended release capsule TAKE 1 CAPSULE BY MOUTH DAILY 30 capsule 0    metFORMIN (GLUCOPHAGE) 500 MG tablet TAKE ONE TABLET BY MOUTH 2 TIMES DAILY WITH MEALS 60 tablet 1    pantoprazole (PROTONIX) 40 MG tablet TAKE ONE TABLET BY MOUTH EVERY MORNING BEFORE BREAKFAST 30 tablet 0    ondansetron (ZOFRAN) 4 MG tablet Take 1 tablet by mouth every 6 hours as needed for Nausea or Vomiting 30 tablet 0    oxyCODONE (ROXICODONE) 5 MG immediate release tablet Take 5 mg by mouth every 4 hours as needed for Pain.  insulin glargine (LANTUS SOLOSTAR) 100 UNIT/ML injection pen Inject 15 Units into the skin nightly As needed      naloxone (NARCAN) 4 MG/0.1ML LIQD nasal spray 1 spray by Nasal route as needed for Opioid Reversal May repeat after 3 minutes if no or minimal response as directed      INCRUSE ELLIPTA 62.5 MCG/INH AEPB INHALE 1 PUFF DAILY 1 each 11    FREESTYLE LITE strip USE 1 STRIP IN VITRO ROUTE 2 TIMES DAILY 100 strip 0    Alcohol Swabs (ALCOHOL PREP) 70 % PADS USE AS DIRECTED 100 each 11    gabapentin (NEURONTIN) 300 MG capsule Take 900 mg by mouth 3 times daily.        metoprolol succinate (TOPROL XL) 50 MG extended release tablet Take 1 tablet by mouth daily 30 tablet 11    vitamin B-1 Current Every Day Smoker     Packs/day: 0.50     Years: 54.00     Pack years: 27.00     Types: Cigarettes, Cigars     Start date: 2018     Last attempt to quit: 2020     Years since quittin.3    Smokeless tobacco: Former User     Types: Snuff    Tobacco comment: less than 1/2 pk   Substance and Sexual Activity    Alcohol use: Not Currently     Alcohol/week: 2.0 standard drinks     Types: 2 Cans of beer per week     Frequency: Monthly or less    Drug use: No    Sexual activity: Yes     Partners: Female   Lifestyle    Physical activity     Days per week: Not on file     Minutes per session: Not on file    Stress: Not on file   Relationships    Social connections     Talks on phone: Not on file     Gets together: Not on file     Attends Gnosticist service: Not on file     Active member of club or organization: Not on file     Attends meetings of clubs or organizations: Not on file     Relationship status: Not on file    Intimate partner violence     Fear of current or ex partner: Not on file     Emotionally abused: Not on file     Physically abused: Not on file     Forced sexual activity: Not on file   Other Topics Concern    Not on file   Social History Narrative    Not on file       Family HISTORY    Family History   Problem Relation Age of Onset    Cancer Mother         Lung    Emphysema Father     Diabetes Sister     Hypertension Sister     Cancer Brother         throat    Asthma Neg Hx        PHYSICAL EXAM    Vital Signs:  Ht 5' 7\" (1.702 m)   Wt 210 lb (95.3 kg)   BMI 32.89 kg/m²   General Appearance:  Normal body habitus. Alert and oriented to person, place, and time. Affect:  Normal.   Gait: Antalgic. Good balance and coordination. Skin:  Intact. Sensation:  Intact. Strength:  Intact. Reflexes:  Intact. Pulses:  Intact.    Knee Exam:    Effusion: Negative    Range of Motion Right Left   Extension -8 0   Flexion 110 115     Provocative Test Right Left    Positive Negative Positive Negative   Anterior drawer [] [x] [] [x]   Lachman [] [x] [] [x]   Posterior drawer [] [x] [] [x]   Varus testing [] [x] [] [x]   Valgus testing [x] [] [] [x]   Joint line tenderness [x] [] [] [x]     Additional Exam Comments: His neurocirculatory lymphatic exam otherwise appears normal symmetric both lower extremities. He has generalized pain weakness and some laxity medially consistent with osteoarthritis with crepitus. IMAGING STUDIES    X-rays 3 views the right knee demonstrate severe end-stage osteoarthritis    IMPRESSION    Severe end-stage osteoarthritis    PLAN      1. Conservative care options including physical therapy, NSAIDs, bracing, and activity modification were discussed. 2.  The indications for therapeutic injections were discussed. 3.  The indications for additional imaging studies were discussed. 4.  After considering the various options discussed, the patient elected to pursue a course that includes requesting viscosupplementation with Durolane. Is an insulin-dependent diabetic I do not feel comfortable giving him any more cortisone injections. He has multiple medical comorbidities and has had some fairly extensive and aggressive vascular surgery in his abdomen. He certainly would be high risk for surgery. He also has been in chronic pain management is under contract.

## 2020-12-14 RX ORDER — FOLIC ACID 1 MG/1
TABLET ORAL
Qty: 30 TABLET | Refills: 0 | Status: SHIPPED | OUTPATIENT
Start: 2020-12-14 | End: 2021-01-12

## 2020-12-15 ENCOUNTER — TELEPHONE (OUTPATIENT)
Dept: ORTHOPEDIC SURGERY | Age: 65
End: 2020-12-15

## 2020-12-17 RX ORDER — ALBUTEROL SULFATE 90 UG/1
AEROSOL, METERED RESPIRATORY (INHALATION)
Qty: 8.5 G | Refills: 0 | Status: SHIPPED | OUTPATIENT
Start: 2020-12-17 | End: 2021-01-21

## 2020-12-19 ENCOUNTER — HOSPITAL ENCOUNTER (OUTPATIENT)
Dept: GENERAL RADIOLOGY | Age: 65
Discharge: HOME | End: 2020-12-19
Payer: COMMERCIAL

## 2020-12-19 ENCOUNTER — HOSPITAL ENCOUNTER (OUTPATIENT)
Age: 65
Discharge: HOME | End: 2020-12-19
Payer: COMMERCIAL

## 2020-12-19 PROCEDURE — 72100 X-RAY EXAM L-S SPINE 2/3 VWS: CPT

## 2020-12-22 RX ORDER — GAUZE BANDAGE 2" X 2"
BANDAGE TOPICAL
Qty: 30 TABLET | Refills: 0 | Status: SHIPPED | OUTPATIENT
Start: 2020-12-22 | End: 2021-01-20

## 2020-12-22 RX ORDER — ATORVASTATIN CALCIUM 40 MG/1
TABLET, FILM COATED ORAL
Qty: 30 TABLET | Refills: 0 | Status: SHIPPED | OUTPATIENT
Start: 2020-12-22 | End: 2021-01-25

## 2020-12-29 RX ORDER — ALLOPURINOL 300 MG/1
TABLET ORAL
Qty: 30 TABLET | Refills: 0 | Status: SHIPPED | OUTPATIENT
Start: 2020-12-29 | End: 2021-01-26

## 2020-12-29 RX ORDER — METOPROLOL SUCCINATE 50 MG/1
TABLET, EXTENDED RELEASE ORAL
Qty: 30 TABLET | Refills: 0 | Status: SHIPPED | OUTPATIENT
Start: 2020-12-29 | End: 2021-01-27

## 2020-12-29 RX ORDER — PANTOPRAZOLE SODIUM 40 MG/1
TABLET, DELAYED RELEASE ORAL
Qty: 30 TABLET | Refills: 0 | Status: SHIPPED | OUTPATIENT
Start: 2020-12-29 | End: 2021-01-26

## 2020-12-29 NOTE — TELEPHONE ENCOUNTER
Last OV 9/8/20  Future Appointments   Date Time Provider Alina Friend   4/13/2021 10:00 AM Mary Fuentes MD [facility]

## 2020-12-30 ENCOUNTER — TELEPHONE (OUTPATIENT)
Dept: ORTHOPEDIC SURGERY | Age: 65
End: 2020-12-30

## 2021-01-05 ENCOUNTER — TELEPHONE (OUTPATIENT)
Dept: ORTHOPEDIC SURGERY | Age: 66
End: 2021-01-05

## 2021-01-05 NOTE — TELEPHONE ENCOUNTER
General Question     Subject: APPROVAL FOR GEL INJECTIONS  Patient and /or Facility Request: Pavan Michaels  Contact Number: [phone]

## 2021-01-06 DIAGNOSIS — I10 BENIGN ESSENTIAL HTN: ICD-10-CM

## 2021-01-06 DIAGNOSIS — R03.0 ELEVATED BLOOD PRESSURE READING: ICD-10-CM

## 2021-01-06 RX ORDER — ONDANSETRON 4 MG/1
4 TABLET, FILM COATED ORAL EVERY 6 HOURS PRN
Qty: 30 TABLET | Refills: 0 | Status: SHIPPED | OUTPATIENT
Start: 2021-01-06 | End: 2021-01-12

## 2021-01-06 RX ORDER — DILTIAZEM HYDROCHLORIDE 360 MG/1
CAPSULE, EXTENDED RELEASE ORAL
Qty: 30 CAPSULE | Refills: 0 | Status: SHIPPED | OUTPATIENT
Start: 2021-01-06 | End: 2021-02-02

## 2021-01-12 ENCOUNTER — TELEPHONE (OUTPATIENT)
Dept: ORTHOPEDIC SURGERY | Age: 66
End: 2021-01-12

## 2021-01-12 RX ORDER — ONDANSETRON 4 MG/1
4 TABLET, FILM COATED ORAL EVERY 6 HOURS PRN
Qty: 30 TABLET | Refills: 0 | Status: SHIPPED | OUTPATIENT
Start: 2021-01-12 | End: 2021-03-22

## 2021-01-12 RX ORDER — FOLIC ACID 1 MG/1
TABLET ORAL
Qty: 30 TABLET | Refills: 0 | Status: SHIPPED | OUTPATIENT
Start: 2021-01-12 | End: 2021-02-08

## 2021-01-20 ENCOUNTER — TELEPHONE (OUTPATIENT)
Dept: ORTHOPEDIC SURGERY | Age: 66
End: 2021-01-20

## 2021-01-20 NOTE — TELEPHONE ENCOUNTER
Last OV 9/8/20  Future Appointments   Date Time Provider Alina Friend   4/13/2021 10:00 AM MD DEMARCUS Staton

## 2021-01-21 DIAGNOSIS — J41.8 MIXED SIMPLE AND MUCOPURULENT CHRONIC BRONCHITIS (HCC): ICD-10-CM

## 2021-01-21 RX ORDER — ALBUTEROL SULFATE 90 UG/1
AEROSOL, METERED RESPIRATORY (INHALATION)
Qty: 8.5 G | Refills: 0 | Status: SHIPPED | OUTPATIENT
Start: 2021-01-21 | End: 2021-03-02

## 2021-01-21 RX ORDER — GAUZE BANDAGE 2" X 2"
BANDAGE TOPICAL
Qty: 30 TABLET | Refills: 3 | Status: SHIPPED | OUTPATIENT
Start: 2021-01-21 | End: 2021-05-11

## 2021-01-27 DIAGNOSIS — I10 BENIGN ESSENTIAL HTN: ICD-10-CM

## 2021-01-27 DIAGNOSIS — I10 UNCONTROLLED HYPERTENSION: ICD-10-CM

## 2021-01-28 ENCOUNTER — OFFICE VISIT (OUTPATIENT)
Dept: ORTHOPEDIC SURGERY | Age: 66
End: 2021-01-28
Payer: COMMERCIAL

## 2021-01-28 DIAGNOSIS — M17.11 PRIMARY OSTEOARTHRITIS OF RIGHT KNEE: Primary | ICD-10-CM

## 2021-01-28 PROCEDURE — 99213 OFFICE O/P EST LOW 20 MIN: CPT | Performed by: ORTHOPAEDIC SURGERY

## 2021-01-28 RX ORDER — METOPROLOL SUCCINATE 50 MG/1
TABLET, EXTENDED RELEASE ORAL
Qty: 30 TABLET | Refills: 5 | Status: SHIPPED | OUTPATIENT
Start: 2021-01-28 | End: 2021-07-19

## 2021-01-28 NOTE — PROGRESS NOTES
 Knee pain, right     Numbness and tingling of leg     Osteoarthritis     Paresthesia of bilateral legs     PVD (peripheral vascular disease) ([facility])     Seizures ([facility])     ongoing, began after swine flu vaccination    Substance abuse ([facility])        Allergies    Allergies   Allergen Reactions    Lisinopril Swelling    Ace Inhibitors Swelling    Influenza Vaccines      He states he was hospitalized when he received his first flu vaccine    Spiriva Handihaler [Tiotropium Bromide Monohydrate] Swelling     Tolerates both tudorza and incruse       Meds    Current Outpatient Medications   Medication Sig Dispense Refill    metoprolol succinate (TOPROL XL) 50 MG extended release tablet TAKE ONE TABLET BY MOUTH EVERY DAY 30 tablet 5    pantoprazole (PROTONIX) 40 MG tablet TAKE ONE TABLET BY MOUTH EVERY MORNING BEFORE BREAKFAST 30 tablet 1    allopurinol (ZYLOPRIM) 300 MG tablet TAKE 1 TABLET BY MOUTH DAILY 30 tablet 1    atorvastatin (LIPITOR) 40 MG tablet TAKE 1 TABLET BY MOUTH DAILY 30 tablet 2    thiamine mononitrate 100 MG tablet TAKE 1 TABLET BY MOUTH DAILY 30 tablet 3    albuterol sulfate  (90 Base) MCG/ACT inhaler INHALE 2 PUFFS EVERY 6 HOURS AS NEEDED FOR WHEEZING 8.5 g 0    folic acid (FOLVITE) 1 MG tablet TAKE 1 TABLET BY MOUTH DAILY 30 tablet 0    ondansetron (ZOFRAN) 4 MG tablet Take 1 tablet by mouth every 6 hours as needed for Nausea or Vomiting 30 tablet 0    dilTIAZem (TIAZAC) 360 MG extended release capsule TAKE 1 CAPSULE BY MOUTH DAILY 30 capsule 0    metFORMIN (GLUCOPHAGE) 500 MG tablet TAKE ONE TABLET BY MOUTH 2 TIMES DAILY WITH MEALS 60 tablet 1    oxyCODONE (ROXICODONE) 5 MG immediate release tablet Take 5 mg by mouth every 4 hours as needed for Pain.       insulin glargine (LANTUS SOLOSTAR) 100 UNIT/ML injection pen Inject 15 Units into the skin nightly As needed Varus testing [] [x] [] [x]   Valgus testing [x] [] [] [x]   Joint line tenderness [x] [] [] [x]     Additional Exam Comments: His neurocirculatory lymphatic exam otherwise appears normal symmetric both lower extremities. He has generalized pain weakness and some laxity medially consistent with osteoarthritis with crepitus. IMAGING STUDIES    X-rays 3 views the right knee demonstrate severe end-stage osteoarthritis    IMPRESSION    Severe end-stage osteoarthritis    PLAN      1. Conservative care options including physical therapy, NSAIDs, bracing, and activity modification were discussed. 2.  The indications for therapeutic injections were discussed. 3.  The indications for additional imaging studies were discussed. 4.  After considering the various options discussed, the patient elected to pursue a course that includes requesting viscosupplementation with Durolane. Certainly one other option would be a total knee replacement for Mr. Cipriano Fernandez, but his other medical comorbidities include aortofemoral bypass, aorto iliac bypass, COPD asthma hypertension and severe insulin-dependent diabetes. He is in chronic pain management for back issues and is on pain management contract for that. Because of his medical condition and multiple severe medical comorbidities, I feel would be extremely risky to perform knee replacement surgery electively on him, as he has medical comorbidities that would make him an extremely poor candidate and extremely high risk. Because of this I would recommend conservative management with viscosupplementation since surgery is not really a option for him electively and because of his insulin-dependent diabetes, corticosteroid injections into the knee would also be relatively contraindicated because of the likelihood of hyperglycemia and other consequences of that condition. I would appreciate reconsideration in my recommendation or request for viscosupplementation for this man because of his severe pain that has not improved with other less risky options and for which he is at too much risk for more aggressive options.

## 2021-02-05 ENCOUNTER — OFFICE VISIT (OUTPATIENT)
Dept: FAMILY MEDICINE CLINIC | Age: 66
End: 2021-02-05
Payer: COMMERCIAL

## 2021-02-05 VITALS
OXYGEN SATURATION: 92 % | WEIGHT: 225.2 LBS | TEMPERATURE: 98.4 F | BODY MASS INDEX: 35.27 KG/M2 | HEART RATE: 72 BPM | SYSTOLIC BLOOD PRESSURE: 134 MMHG | DIASTOLIC BLOOD PRESSURE: 68 MMHG

## 2021-02-05 DIAGNOSIS — K70.10 CHRONIC ALCOHOLIC HEPATITIS: ICD-10-CM

## 2021-02-05 DIAGNOSIS — J41.8 MIXED SIMPLE AND MUCOPURULENT CHRONIC BRONCHITIS (HCC): ICD-10-CM

## 2021-02-05 DIAGNOSIS — Z12.5 SCREENING FOR PROSTATE CANCER: ICD-10-CM

## 2021-02-05 DIAGNOSIS — J47.9 BRONCHIECTASIS WITHOUT COMPLICATION (HCC): ICD-10-CM

## 2021-02-05 DIAGNOSIS — K21.9 GASTROESOPHAGEAL REFLUX DISEASE WITHOUT ESOPHAGITIS: ICD-10-CM

## 2021-02-05 DIAGNOSIS — C67.9 MALIGNANT NEOPLASM OF URINARY BLADDER, UNSPECIFIED SITE (HCC): ICD-10-CM

## 2021-02-05 DIAGNOSIS — E83.52 HYPERCALCEMIA: ICD-10-CM

## 2021-02-05 DIAGNOSIS — E87.1 HYPONATREMIA: ICD-10-CM

## 2021-02-05 DIAGNOSIS — F10.10 ALCOHOL ABUSE: ICD-10-CM

## 2021-02-05 DIAGNOSIS — E78.2 MIXED HYPERLIPIDEMIA: ICD-10-CM

## 2021-02-05 DIAGNOSIS — Z23 NEED FOR TDAP VACCINATION: ICD-10-CM

## 2021-02-05 DIAGNOSIS — I85.10 SECONDARY ESOPHAGEAL VARICES WITHOUT BLEEDING (HCC): ICD-10-CM

## 2021-02-05 DIAGNOSIS — K59.04 CHRONIC IDIOPATHIC CONSTIPATION: ICD-10-CM

## 2021-02-05 PROCEDURE — G8427 DOCREV CUR MEDS BY ELIG CLIN: HCPCS | Performed by: FAMILY MEDICINE

## 2021-02-05 PROCEDURE — 36415 COLL VENOUS BLD VENIPUNCTURE: CPT | Performed by: FAMILY MEDICINE

## 2021-02-05 PROCEDURE — 99214 OFFICE O/P EST MOD 30 MIN: CPT | Performed by: FAMILY MEDICINE

## 2021-02-05 PROCEDURE — 90471 IMMUNIZATION ADMIN: CPT | Performed by: FAMILY MEDICINE

## 2021-02-05 PROCEDURE — G8484 FLU IMMUNIZE NO ADMIN: HCPCS | Performed by: FAMILY MEDICINE

## 2021-02-05 PROCEDURE — 3017F COLORECTAL CA SCREEN DOC REV: CPT | Performed by: FAMILY MEDICINE

## 2021-02-05 PROCEDURE — 3046F HEMOGLOBIN A1C LEVEL >9.0%: CPT | Performed by: FAMILY MEDICINE

## 2021-02-05 PROCEDURE — 4004F PT TOBACCO SCREEN RCVD TLK: CPT | Performed by: FAMILY MEDICINE

## 2021-02-05 PROCEDURE — 90715 TDAP VACCINE 7 YRS/> IM: CPT | Performed by: FAMILY MEDICINE

## 2021-02-05 PROCEDURE — 3023F SPIROM DOC REV: CPT | Performed by: FAMILY MEDICINE

## 2021-02-05 PROCEDURE — G8926 SPIRO NO PERF OR DOC: HCPCS | Performed by: FAMILY MEDICINE

## 2021-02-05 PROCEDURE — 2022F DILAT RTA XM EVC RTNOPTHY: CPT | Performed by: FAMILY MEDICINE

## 2021-02-05 PROCEDURE — G8417 CALC BMI ABV UP PARAM F/U: HCPCS | Performed by: FAMILY MEDICINE

## 2021-02-05 RX ORDER — LANOLIN ALCOHOL/MO/W.PET/CERES
CREAM (GRAM) TOPICAL
COMMUNITY
Start: 2021-01-21 | End: 2021-04-14

## 2021-02-05 NOTE — PROGRESS NOTES
Chief Complaint   Patient presents with    Hyperlipidemia    Hypertension    Diabetes    Gastroesophageal Reflux     Patient has been doing okay but he is really having trouble because he found out his girlfriend was given 6 months to live. He is struggling to care for her and with watching her die in front of him. HPI:  Sumit Casey is a 60 y.o. (: 1955) here today for    Diabetes: patient checks his blood sugar was 112 this AM. He checks his Bg twice a day. He tries to follow a low carbohydrate diet. Hypertension: patient checks his blood pressure from time to time at home    GERD: denies any issues with his acid reflux. Chronic pain: patient continues to have trouble related to his back pain and leg pain, he had surgery to try and help his back but he is still having the pain. Dr. Josefina Ayala may be doing an injection in his knees to help with his pain. Copd: patient states that he has been having some issues related to getting short of breath but along his usual shortness of breath. He also developed some dizziness when standing up. He has some ear pain and feeling like he hears water swishing around and popping in the his right ear. appears that the ear issues may be due to sinus issues. Discussed that he may also have some nerve damage to the right ear. Patient's medications, allergies, past medical, surgical, social and family histories were reviewed and updated asappropriate on 2021 at 1:10 PM.    ROS:  Review of Systems    All other systems reviewed and are negative except as noted above on 2021 at 1:10 PM. Additional review of systems may be scanned into the media section ofthis medical record. Any responses requiring further intervention were pursued.     Hemoglobin A1C (%)   Date Value   2020 6.6     Microscopic Examination (no units)   Date Value   2020 Not Indicated     LDL Calculated (mg/dL)   Date Value   2019 106 (H) Past Medical History:   Diagnosis Date    Bronchitis chronic     Chest pain     Chronic cough     Cirrhosis of liver (HCC) 01/01/2017    stage 4     COPD (chronic obstructive pulmonary disease) (HCC)     COPD (chronic obstructive pulmonary disease) (HCC)     Diabetes mellitus (HCC)     Gout     Hilar adenopathy     Hyperlipidemia     Hypertension     Knee pain, right     Numbness and tingling of leg     Osteoarthritis     Paresthesia of bilateral legs     PVD (peripheral vascular disease) (HCC)     Seizures (HCC)     ongoing, began after swine flu vaccination    Substance abuse ([facility] [address]      Family History   Problem Relation Age of Onset    Cancer Mother         Lung    Emphysema Father     Diabetes Sister     Hypertension Sister     Cancer Brother         throat    Asthma Neg Hx      Social History     Socioeconomic History    Marital status:      Spouse name: Not on file    Number of children: Not on file    Years of education: Not on file    Highest education level: Not on file   Occupational History    Occupation: [employer]     Comment: not working currently   Social Needs    Financial resource strain: Not on file    Food insecurity     Worry: Not on file     Inability: Not on file   [place] needs     Medical: Not on file     Non-medical: Not on file   Tobacco Use    Smoking status: Current Every Day Smoker     Packs/day: 0.25     Years: 54.00     Pack years: 13.50     Types: Cigarettes, Cigars     Start date: 9/4/2018    Smokeless tobacco: Former User     Types: Snuff    Tobacco comment: less than 1/2 pk   Substance and Sexual Activity    Alcohol use: Not Currently     Alcohol/week: 2.0 standard drinks     Types: 2 Cans of beer per week     Frequency: Monthly or less    Drug use: No    Sexual activity: Yes     Partners: Female   Lifestyle    Physical activity     Days per week: Not on file     Minutes per session: Not on file    Stress: Not on file Relationships    Social connections     Talks on phone: Not on file     Gets together: Not on file     Attends Islam service: Not on file     Active member of club or organization: Not on file     Attends meetings of clubs or organizations: Not on file     Relationship status: Not on file    Intimate partner violence     Fear of current or ex partner: Not on file     Emotionally abused: Not on file     Physically abused: Not on file     Forced sexual activity: Not on file   Other Topics Concern    Not on file   Social History Narrative    Not on file       Prior to Visit Medications    Medication Sig Taking?  Authorizing Provider   thiamine 100 MG tablet  Yes Historical Provider, MD   dilTIAZem (TIAZAC) 360 MG extended release capsule TAKE 1 CAPSULE BY MOUTH DAILY Yes Meeta Espino MD   metoprolol succinate (TOPROL XL) 50 MG extended release tablet TAKE ONE TABLET BY MOUTH EVERY DAY Yes Meeta Espino MD   pantoprazole (PROTONIX) 40 MG tablet TAKE ONE TABLET BY MOUTH EVERY MORNING BEFORE BREAKFAST Yes NAYELY Pruitt CNP   allopurinol (ZYLOPRIM) 300 MG tablet TAKE 1 TABLET BY MOUTH DAILY Yes NAYELY Pruitt CNP   atorvastatin (LIPITOR) 40 MG tablet TAKE 1 TABLET BY MOUTH DAILY Yes Meeta Espino MD   thiamine mononitrate 100 MG tablet TAKE 1 TABLET BY MOUTH DAILY Yes Meeta Espino MD   albuterol sulfate  (90 Base) MCG/ACT inhaler INHALE 2 PUFFS EVERY 6 HOURS AS NEEDED FOR WHEEZING Yes Meeta Espino MD   folic acid (FOLVITE) 1 MG tablet TAKE 1 TABLET BY MOUTH DAILY Yes Meeta Espino MD   ondansetron (ZOFRAN) 4 MG tablet Take 1 tablet by mouth every 6 hours as needed for Nausea or Vomiting Yes NAYELY Pruitt CNP   metFORMIN (GLUCOPHAGE) 500 MG tablet TAKE ONE TABLET BY MOUTH 2 [place] Pacer Yes Meeta Espino MD oxyCODONE (ROXICODONE) 5 MG immediate release tablet Take 5 mg by mouth every 4 hours as needed for Pain. Yes Historical Provider, MD   insulin glargine (LANTUS SOLOSTAR) 100 UNIT/ML injection pen Inject 15 Units into the skin nightly As needed Yes Historical Provider, MD   naloxone (NARCAN) 4 MG/0.1ML LIQD nasal spray 1 spray by Nasal route as needed for Opioid Reversal May repeat after 3 minutes if no or minimal response as directed Yes Historical Provider, MD   INCRUSE ELLIPTA 62.5 MCG/INH AEPB INHALE 1 PUFF DAILY Yes Linda Noriega MD   FREESTYLE LITE strip USE 1 STRIP IN VITRO ROUTE 2 TIMES DAILY Yes Linda Noriega MD   Alcohol Swabs (ALCOHOL PREP) 70 % PADS USE AS DIRECTED Yes Linda Noriega MD   gabapentin (NEURONTIN) 300 MG capsule Take 900 mg by mouth 3 times daily.   Yes Historical Provider, MD CALLEJASYLE LANCETS MISC TEST 2 TIMES DAILY Yes Linda Noriega MD   methocarbamol (ROBAXIN) 750 MG tablet TAKE ONE TABLET BY MOUTH FOUR TIMES A DAY AS NEEDED FOR MUSCLE SPASMS  Patient taking differently: Take 750 mg by mouth 4 times daily as needed  Yes Linda Noriega MD   Blood Pressure Monitoring (BLOOD PRESSURE CUFF) MISC Please dispense insurance preference Yes Linda Noriega MD   budesonide (PULMICORT) 0.5 MG/2ML nebulizer suspension Take 2 mLs by nebulization 2 times daily Yes Linda Noriega MD   UNIFINE PENTIPS 31G X 8 MM MISC USE ONCE DAILY WITH INJECTION Yes Linda Noriega MD   Blood Glucose Monitoring Suppl (FREESTYLE FREEDOM LITE) w/Device KIT 1 kit by Does not apply route daily Yes Dylan Avni, APRN - CNP   OXYGEN Inhale 2 L/min into the lungs nightly as needed (and daily prn)  Yes Historical Provider, MD     Allergies   Allergen Reactions    Lisinopril Swelling    Ace Inhibitors Swelling    Influenza Vaccines      He states he was hospitalized when he received his first flu vaccine  Spiriva Handihaler [Tiotropium Bromide Monohydrate] Swelling     Tolerates both tudorza and incruse       OBJECTIVE:  Estimated body mass index is 35.27 kg/m² as calculated from the following:    Height as of 12/10/20: 5' 7\" (1.702 m). Weight as of this encounter: 225 lb 3.2 oz (102.2 kg). Vitals:    02/05/21 1302 02/05/21 1308   BP: (!) 154/62 134/68   Site: Left Upper Arm    Position: Sitting    Pulse: 72    Temp: 98.4 °F (36.9 °C)    TempSrc: Temporal    SpO2: 92%    Weight: 225 lb 3.2 oz (102.2 kg)      BP Readings from Last 2 Encounters:   02/05/21 134/68   09/08/20 134/66     Wt Readings from Last 3 Encounters:   02/05/21 225 lb 3.2 oz (102.2 kg)   12/10/20 210 lb (95.3 kg)   09/08/20 210 lb 3.2 oz (95.3 kg)       Physical Exam  HENT:      Right Ear: Tympanic membrane, ear canal and external ear normal.   Cardiovascular:      Rate and Rhythm: Regular rhythm. Heart sounds: Normal heart sounds. Pulmonary:      Breath sounds: Wheezing (course) present. Abdominal:      Tenderness: There is no abdominal tenderness. Musculoskeletal:      Right lower leg: Edema (2+) present. Left lower leg: Edema (2+) present. Neurological:      Mental Status: He is oriented to person, place, and time. Psychiatric:         Mood and Affect: Mood normal.         Behavior: Behavior normal.              ASSESSMENT PLAN      Diagnosis Orders   1. Insulin dependent type 2 diabetes mellitus, uncontrolled ([facility] [address]  HEMOGLOBIN A1C    Diabetic Foot Exam    MICROALBUMIN / CREATININE URINE RATIO   2. Screening for prostate cancer  Psa screening   3. Need for Tdap vaccination  Tdap (age 6y and older) IM ([address])   4. Gastroesophageal reflux disease without esophagitis     5. Chronic alcoholic hepatitis     6. Chronic idiopathic constipation     7. Secondary esophageal varices without bleeding (HCC)     8.  Malignant neoplasm of urinary bladder, unspecified site ([address] 9. Mixed simple and mucopurulent chronic bronchitis ([address]     10. Bronchiectasis without complication ([address]     11. Alcohol abuse     12. Hyponatremia     13. Mixed hyperlipidemia     14. Hypercalcemia     No symptoms of elevated sugar. Lots of concern about his girlfriend who has MAC. Offered referral to nurse coordinator to call him to assist with many issues he has concerns about. Reflux controlled. Continue to monitor liver function. Bowels okay. No esophageal bleeding. No recurrence bladder tumor as far as we know. COPD at baseline. He has decreased his alcohol intake. Update electrolytes when due. Continue lipid monitoring as needed. Update calcium with the electrolytes. Follow-up 6 months. Patient should call the office immediately with new or ongoing signs or symptoms or worsening, or proceed to the emergency room. No changes in past medical history, past surgical history, social history, or family history were noted during the patient encounter unless specifically listed above. All updates of past medical history, past surgical history, social history, or family history were reviewed personally by me during the office visit. All problems listed in the assessment are stable unless noted otherwise. Medication profile reviewed personally by me during the visit. Medication side effects and possible impairments from medications were discussed as applicable. This document was prepared by a combination of typing and transcription through a voice recognition software. Patient should call the office immediately with new or ongoing signs or symptoms or worsening, or proceed to the emergency room. No changes in past medical history, past surgical history, social history, or family history were noted during the patient encounter unless specifically listed above. All updates of past medical history, past surgical history, social history, or family history were reviewed personally by me during the office visit. All problems listed in the assessment are stable unless noted otherwise. Medication profile reviewed personally by me during the visit. Medication side effects and possible impairments from medications were discussed as applicable. This document was prepared by a combination of typing and transcription through a voice recognition software. Scribe attestation: Kinga Ventura RN, am scribing for and in the presence of Zoraida Genao MD. Electronically signed by Shawn Kovacs RN on 2/5/2021 at 1:10 PM      Provider attestation:     I, Dr. Lance Brink, personally performed the services described in this documentation, as scribed by the above signed scribe in my presence, and it is both accurate and complete. I agree with the ROS and Past Histories independently gathered by the clinical support staff and the remaining scribed note accurately describes my personal service to the patient.       2/5/2021    2:12 PM

## 2021-02-05 NOTE — PATIENT INSTRUCTIONS

## 2021-02-06 LAB
CREATININE URINE: 107.4 MG/DL (ref 39–259)
ESTIMATED AVERAGE GLUCOSE: 137 MG/DL
HBA1C MFR BLD: 6.4 %
MICROALBUMIN UR-MCNC: 2.8 MG/DL
MICROALBUMIN/CREAT UR-RTO: 26.1 MG/G (ref 0–30)
PROSTATE SPECIFIC ANTIGEN: 1.48 NG/ML (ref 0–4)

## 2021-02-08 ENCOUNTER — TELEPHONE (OUTPATIENT)
Dept: FAMILY MEDICINE CLINIC | Age: 66
End: 2021-02-08

## 2021-02-08 DIAGNOSIS — R60.0 LOCALIZED EDEMA: Primary | ICD-10-CM

## 2021-02-08 RX ORDER — FUROSEMIDE 20 MG/1
20 TABLET ORAL DAILY
Qty: 30 TABLET | Refills: 5 | Status: SHIPPED | OUTPATIENT
Start: 2021-02-08 | End: 2021-07-26

## 2021-02-08 NOTE — TELEPHONE ENCOUNTER
Patient has been having a lot of issues related to his legs swelling, his legs are developing pitting edema. Right foot will well so bad he can bend his toes. Denies any redness or tender to the touch. Discussed with patient compression stockings and elevated his legs as much as possible. Patient wants to know if there is anything else that can be done.

## 2021-02-08 NOTE — TELEPHONE ENCOUNTER
LMOR to call back - sent in Rx and placed lab orders.   Have pt schedule for blood work a couple days prior to visit with THOMAS

## 2021-02-08 NOTE — TELEPHONE ENCOUNTER
Furosemide 20 mg daily.   An office appointment 1 month to evaluate his swelling and get a BMP and magnesium

## 2021-02-10 ENCOUNTER — TELEPHONE (OUTPATIENT)
Dept: ORTHOPEDIC SURGERY | Age: 66
End: 2021-02-10

## 2021-02-10 DIAGNOSIS — M17.11 PRIMARY OSTEOARTHRITIS OF RIGHT KNEE: Primary | ICD-10-CM

## 2021-02-23 ENCOUNTER — TELEPHONE (OUTPATIENT)
Dept: ORTHOPEDIC SURGERY | Age: 66
End: 2021-02-23

## 2021-02-23 NOTE — TELEPHONE ENCOUNTER
02/23/2021  MANN   (QTY 1)  RIGHT   KNEE. APPROVED #  [id number]. DATES:  02/22/2021 - 08/22/2021. PER FAX FROM [employer].

## 2021-02-25 ENCOUNTER — OFFICE VISIT (OUTPATIENT)
Dept: ORTHOPEDIC SURGERY | Age: 66
End: 2021-02-25
Payer: COMMERCIAL

## 2021-02-25 VITALS — WEIGHT: 225 LBS | BODY MASS INDEX: 35.31 KG/M2 | HEIGHT: 67 IN

## 2021-02-25 DIAGNOSIS — M17.11 PRIMARY OSTEOARTHRITIS OF RIGHT KNEE: Primary | ICD-10-CM

## 2021-02-25 PROCEDURE — 20610 DRAIN/INJ JOINT/BURSA W/O US: CPT | Performed by: ORTHOPAEDIC SURGERY

## 2021-02-26 ENCOUNTER — TELEPHONE (OUTPATIENT)
Dept: PULMONOLOGY | Age: 66
End: 2021-02-26

## 2021-02-26 RX ORDER — DOXYCYCLINE HYCLATE 100 MG
100 TABLET ORAL 2 TIMES DAILY
Qty: 14 TABLET | Refills: 0 | Status: SHIPPED | OUTPATIENT
Start: 2021-02-26 | End: 2021-03-05

## 2021-02-26 RX ORDER — PREDNISONE 10 MG/1
TABLET ORAL
Qty: 30 TABLET | Refills: 0 | Status: SHIPPED | OUTPATIENT
Start: 2021-02-26 | End: 2021-03-10

## 2021-02-26 NOTE — TELEPHONE ENCOUNTER
Do you have the following symptoms? Shortness of Breath  yes  Wheezing  yes  Cough  yes  Cough Characteristics:  Productive    yes  Sputum Color    yellow  Hemoptysis   no  Consistency of sputum   thick     Fever:    no    Temp:no  Chills/Sweats:  no  What other symptoms are you having?:  Chest tightness    How long have you had these symptoms? 4-5 days     [facility]          Review medications and allergies: Allergies? Allergies   Allergen Reactions    Lisinopril Swelling    Ace Inhibitors Swelling    Influenza Vaccines      He states he was hospitalized when he received his first flu vaccine    Spiriva Handihaler [Tiotropium Bromide Monohydrate] Swelling     Tolerates both tudorza and incruse             Currently on Antibiotics? (Drug/Dose/Frequency and how long on?) no        Systemic Steroids? (Drug/Dose/Frequency and how long on?) no         Pull in last office note. 10/7/20  sessment:  · Moderate COPD on last PFT in 2011   · Peripheral vascular disease s/p bypass surgery  · Obesity  · Lower lobe bronchiectasis on abdominal CT   · Cirrhosis  · Tobacco abuse, 1-3 ppd X 42 years, average about 2 ppd for 80 pack year hx, now 1/2 ppd     Plan:   · Incruse daily, did not tolerate Anoro  · PRN albuterol  · LDCT for LCS now, can set up for next week   · 6 month f/u with me

## 2021-02-26 NOTE — TELEPHONE ENCOUNTER
I sent in Prednisone taper and Doxycycline to the pharmacy that was selected. I did this based upon my review of the information provided - likely the patient has an acute exacerbation of respiratory illness    Please confirm that the pharmacy selected was correct. If not, please call in the medications to the pharmacy of the patient's choice and cancel other routed rx. Please notify the patient that I have sent in these medications. If they are not helpful and the patient experiences any new or concerning symptoms or worsening of symptoms, she needs to be seen by me, her PCP or proceed directly to the emergency department.

## 2021-03-05 ENCOUNTER — NURSE ONLY (OUTPATIENT)
Dept: FAMILY MEDICINE CLINIC | Age: 66
End: 2021-03-05
Payer: COMMERCIAL

## 2021-03-05 DIAGNOSIS — R60.0 LOCALIZED EDEMA: ICD-10-CM

## 2021-03-05 PROCEDURE — 36415 COLL VENOUS BLD VENIPUNCTURE: CPT | Performed by: FAMILY MEDICINE

## 2021-03-06 LAB
ANION GAP SERPL CALCULATED.3IONS-SCNC: 11 MMOL/L (ref 3–16)
BUN BLDV-MCNC: 10 MG/DL (ref 7–20)
CALCIUM SERPL-MCNC: 9.1 MG/DL (ref 8.3–10.6)
CHLORIDE BLD-SCNC: 96 MMOL/L (ref 99–110)
CO2: 31 MMOL/L (ref 21–32)
CREAT SERPL-MCNC: 0.8 MG/DL (ref 0.8–1.3)
GFR AFRICAN AMERICAN: >60
GFR NON-AFRICAN AMERICAN: >60
GLUCOSE BLD-MCNC: 236 MG/DL (ref 70–99)
MAGNESIUM: 1.9 MG/DL (ref 1.8–2.4)
POTASSIUM SERPL-SCNC: 3.6 MMOL/L (ref 3.5–5.1)
SODIUM BLD-SCNC: 138 MMOL/L (ref 136–145)

## 2021-03-09 ENCOUNTER — OFFICE VISIT (OUTPATIENT)
Dept: FAMILY MEDICINE CLINIC | Age: 66
End: 2021-03-09
Payer: COMMERCIAL

## 2021-03-09 VITALS
BODY MASS INDEX: 34.84 KG/M2 | WEIGHT: 222 LBS | TEMPERATURE: 98.3 F | DIASTOLIC BLOOD PRESSURE: 60 MMHG | SYSTOLIC BLOOD PRESSURE: 148 MMHG | HEART RATE: 81 BPM | OXYGEN SATURATION: 97 % | HEIGHT: 67 IN

## 2021-03-09 DIAGNOSIS — M17.11 PRIMARY OSTEOARTHRITIS OF RIGHT KNEE: ICD-10-CM

## 2021-03-09 DIAGNOSIS — M15.9 OSTEOARTHRITIS OF MULTIPLE JOINTS, UNSPECIFIED OSTEOARTHRITIS TYPE: Primary | ICD-10-CM

## 2021-03-09 DIAGNOSIS — R20.2 PARESTHESIA OF BILATERAL LEGS: ICD-10-CM

## 2021-03-09 DIAGNOSIS — I73.9 PERIPHERAL VASCULAR DISEASE (HCC): ICD-10-CM

## 2021-03-09 DIAGNOSIS — I87.2 VENOUS INSUFFICIENCY: ICD-10-CM

## 2021-03-09 PROCEDURE — 99214 OFFICE O/P EST MOD 30 MIN: CPT | Performed by: FAMILY MEDICINE

## 2021-03-09 PROCEDURE — G8427 DOCREV CUR MEDS BY ELIG CLIN: HCPCS | Performed by: FAMILY MEDICINE

## 2021-03-09 PROCEDURE — 4004F PT TOBACCO SCREEN RCVD TLK: CPT | Performed by: FAMILY MEDICINE

## 2021-03-09 PROCEDURE — 3044F HG A1C LEVEL LT 7.0%: CPT | Performed by: FAMILY MEDICINE

## 2021-03-09 PROCEDURE — 3017F COLORECTAL CA SCREEN DOC REV: CPT | Performed by: FAMILY MEDICINE

## 2021-03-09 PROCEDURE — 2022F DILAT RTA XM EVC RTNOPTHY: CPT | Performed by: FAMILY MEDICINE

## 2021-03-09 PROCEDURE — G8484 FLU IMMUNIZE NO ADMIN: HCPCS | Performed by: FAMILY MEDICINE

## 2021-03-09 PROCEDURE — G8417 CALC BMI ABV UP PARAM F/U: HCPCS | Performed by: FAMILY MEDICINE

## 2021-03-09 NOTE — PROGRESS NOTES
Chief Complaint   Patient presents with    Swelling    Discuss Labs    Other     patient has multiple medical complaints       Wt Readings from Last 3 Encounters:   21 222 lb (100.7 kg)   21 225 lb (102.1 kg)   21 225 lb 3.2 oz (102.2 kg)     BP Readings from Last 3 Encounters:   21 (!) 148/60   21 134/68   20 134/66      Lab Results   Component Value Date    LABA1C 6.4 2021    LABA1C 6.6 2020    LABA1C 6.0 2020       HPI:  Queen Caitlin is a 60 y.o. (: 1955) here today for a follow up for his leg swelling. He started on 20 mg Furosemide daily. He has noticed the swelling has gone down but his feet still hurt. He has constant pain on his right side big toe. He has an indent on his right shin that has been there ever since a fall he had about 4-5 years ago. He had an injection on 21 in his right knee which did not help at all. He was told that he could not have surgery. He is still having shooting pain that goes up his leg. Significant other of over 20 years passed away recently. He feels like he is dealing with it okay. [] Patient has completed an advance directive  [x] Patient has NOT completed an advanced directive  [] Patient has a documented healthcare surrogate  [x] Patient does NOT have a documented healthcare surrogate  [] Discussed the importance of establishing and updating an advanced directive. Patient has questions at this time and those were answered. [x] Discussed the importance of establishing and updating an advanced directive. Patient does NOT have questions at this time.     Discussed with: [x] Patient            [] Family             [] Other caregiver    Patient's medications, allergies, past medical, surgical, social and family histories were reviewed and updated asappropriate on 3/9/2021 at 11:30 AM.    ROS:  Review of Systems    All other systems reviewed and are negative except as noted above on 3/9/2021 at 11:30 AM. Additional review of systems [facility] Any responses requiring further intervention were pursued.     Past Medical History:   Diagnosis Date    Bronchitis chronic     Chest pain     Chronic cough     Cirrhosis of liver (HCC) 01/01/2017    stage 4     COPD (chronic obstructive pulmonary disease) (HCC)     COPD (chronic obstructive pulmonary disease) (HCC)     Diabetes mellitus (HCC)     Gout     Hilar adenopathy     Hyperlipidemia     Hypertension     Knee pain, right     Numbness and tingling of leg     Osteoarthritis     Paresthesia of bilateral legs     PVD (peripheral vascular disease) (HCC)     Seizures (HCC)     ongoing, began after swine flu vaccination    Substance abuse ([facility] [address]      Family History   Problem Relation Age of Onset    Cancer Mother         Lung    Emphysema Father     Diabetes Sister     Hypertension Sister     Cancer Brother         throat    Asthma Neg Hx      Social History     Socioeconomic History    Marital status:      Spouse name: Not on file    Number of children: Not on file    Years of education: Not on file    Highest education level: Not on file   Occupational History    Occupation: [employer]     Comment: not working currently   Social Needs    Financial resource strain: Not on file    Food insecurity     Worry: Not on file     Inability: Not on file   [employer] needs     Medical: Not on file     Non-medical: Not on file   Tobacco Use    Smoking status: Current Every Day Smoker     Packs/day: 0.25     Years: 54.00     Pack years: 13.50     Types: Cigarettes, Cigars     Start date: 9/4/2018    Smokeless tobacco: Former User     Types: Snuff    Tobacco comment: less than 1/2 pk   Substance and Sexual Activity    Alcohol use: Not Currently     Alcohol/week: 2.0 standard drinks     Types: 2 Cans of beer per week     Frequency: Monthly or less    Drug use: No    Sexual NAYELY Alexander CNP   atorvastatin (LIPITOR) 40 MG tablet TAKE 1 TABLET BY MOUTH DAILY Yes Rm Reyes MD   thiamine mononitrate 100 MG tablet TAKE 1 TABLET BY MOUTH DAILY Yes Rm Reyes MD   ondansetron (ZOFRAN) 4 MG tablet Take 1 tablet by mouth every 6 hours as needed for Nausea or Vomiting Yes NAYELY Alexander CNP   oxyCODONE (ROXICODONE) 5 MG immediate release tablet Take 5 mg by mouth every 4 hours as needed for Pain. Yes Historical Provider, MD   insulin glargine (LANTUS SOLOSTAR) 100 UNIT/ML injection pen Inject 15 Units into the skin nightly As needed Yes Historical Provider, MD   naloxone (NARCAN) 4 MG/0.1ML LIQD nasal spray 1 spray by Nasal route as needed for Opioid Reversal May repeat after 3 minutes if no or minimal response as directed Yes Historical Provider, MD   INCRUSE ELLIPTA 62.5 MCG/INH AEPB INHALE 1 PUFF DAILY Yes Rm Reyes MD   FREESTYLE LITE strip USE 1 STRIP IN VITRO ROUTE 2 TIMES DAILY Yes Rm Reyes MD   Alcohol Swabs (ALCOHOL PREP) 70 % PADS USE AS DIRECTED Yes Rm Reyes MD   gabapentin (NEURONTIN) 300 MG capsule Take 900 mg by mouth 3 times daily.   Yes Historical Provider, MD   FREESTYLE LANCETS MISC TEST 2 TIMES DAILY Yes Rm Reyes MD   methocarbamol (ROBAXIN) 750 MG tablet TAKE ONE TABLET BY MOUTH FOUR TIMES A DAY AS NEEDED FOR MUSCLE SPASMS  Patient taking differently: Take 750 mg by mouth 4 times daily as needed  Yes Rm Reyes MD   Blood Pressure Monitoring (BLOOD PRESSURE CUFF) MISC Please dispense insurance preference Yes Rm Reyes MD   budesonide (PULMICORT) 0.5 MG/2ML nebulizer suspension Take 2 mLs by nebulization 2 times daily Yes Rm Reyes MD   UNITAO PENTIPS 31G X 8 MM [address] Yes Rm Reyes MD   Blood Glucose Monitoring Suppl (FREESTYLE FREEDOM LITE) w/Device KIT 1 kit by Does not apply route daily Yes Thomas Ansari, NAYELY Bravo CNP   OXYGEN Inhale 2 L/min into the lungs nightly as needed (and daily prn)  Yes Historical Provider, MD     Allergies   Allergen Reactions    Lisinopril Swelling    Ace Inhibitors Swelling    Influenza Vaccines      He states he was hospitalized when he received his first flu vaccine    Spiriva Handihaler [Tiotropium Bromide Monohydrate] Swelling     Tolerates both tudorza and incruse       OBJECTIVE:  Estimated body mass index is 34.77 kg/m² as calculated from the following:    Height as of this encounter: 5' 7\" (1.702 m). Weight as of this encounter: 222 lb (100.7 kg). Vitals:    03/09/21 1103 03/09/21 1129   BP: (!) 148/60 (!) 148/60   Site: Left Upper Arm    Position: Sitting    Cuff Size: Medium Adult    Pulse: 81    Temp: 98.3 °F (36.8 °C)    TempSrc: Temporal    SpO2: 97%    Weight: 222 lb (100.7 kg)    Height: 5' 7\" (1.702 m)        Physical Exam  Vitals signs and nursing note reviewed. Constitutional:       General: He is not in acute distress. Appearance: He is well-developed. He is not diaphoretic. HENT:      Head: Normocephalic and atraumatic. Right Ear: External ear normal.      Left Ear: External ear normal.      Nose: Nose normal.   Eyes:      General: Lids are normal. No scleral icterus. Right eye: No discharge. Left eye: No discharge. Pupils: Pupils are equal, round, and reactive to light. Neck:      Thyroid: No thyromegaly. Vascular: No JVD. Cardiovascular:      Rate and Rhythm: Normal rate and regular rhythm. Heart sounds: Normal heart sounds. Pulmonary:      Effort: Pulmonary effort is normal. No respiratory distress. Breath sounds: Normal breath sounds. Abdominal:      Palpations: Abdomen is soft. There is no hepatomegaly or splenomegaly. Tenderness: There is no abdominal tenderness. Musculoskeletal:        Feet:    Skin:     General: Skin is warm and dry. Coloration: Skin is not pale. Findings: No erythema or rash. Comments: Turgor normal   Psychiatric:         Behavior: Behavior normal.         Thought Content: Thought content normal.         Judgment: Judgment normal.              ASSESSMENT PLAN      Diagnosis Orders   1. Osteoarthritis of multiple joints, unspecified osteoarthritis type  diclofenac sodium (VOLTAREN) 1 % GEL   2. Insulin dependent type 2 diabetes mellitus, uncontrolled (HCC)  GLUCOSE    HEMOGLOBIN A1C   3. Paresthesia of bilateral legs     4. Primary osteoarthritis of right knee     5. Venous insufficiency     6. Peripheral vascular disease ([facility] [address]     Were going to try diclofenac gel for the foot and for the right knee. As far as his leg swelling he is improved do not feel like he needs higher dose furosemide this time. He does not believe the vascular bypass helped but I told him that that would not help his vein circulation. He does not complain about quite as much pain in his legs. No symptoms of elevated sugar update his labs. Follow-up how he is doing with the diclofenac in a month. Seems to be tolerating the loss of his significant other well. Patient should call the office immediately with new or ongoing signs or symptoms or worsening, or proceed to the emergency room. No changes in past medical history, past surgical history, social history, or family history were noted during the patient encounter unless specifically listed above. All updates of past medical history, past surgical history, social history, or family history were reviewed personally by me during the office visit. All problems listed in the assessment are stable unless noted otherwise. Medication profile reviewed personally by me during the visit. Medication side effects and possible impairments from medications were discussed as applicable. This document was prepared by a combination of typing and transcription through a voice recognition software.

## 2021-04-13 ENCOUNTER — VIRTUAL VISIT (OUTPATIENT)
Dept: PULMONOLOGY | Age: 66
End: 2021-04-13
Payer: COMMERCIAL

## 2021-04-13 DIAGNOSIS — Z87.891 HISTORY OF TOBACCO USE: ICD-10-CM

## 2021-04-13 DIAGNOSIS — J44.9 CHRONIC OBSTRUCTIVE PULMONARY DISEASE, UNSPECIFIED COPD TYPE (HCC): Primary | ICD-10-CM

## 2021-04-13 PROCEDURE — 99442 PR PHYS/QHP TELEPHONE EVALUATION 11-20 MIN: CPT | Performed by: INTERNAL MEDICINE

## 2021-04-13 RX ORDER — FLUTICASONE PROPIONATE 110 UG/1
1 AEROSOL, METERED RESPIRATORY (INHALATION) 2 TIMES DAILY
Qty: 1 INHALER | Refills: 5 | Status: SHIPPED | OUTPATIENT
Start: 2021-04-13 | End: 2022-02-24

## 2021-04-13 NOTE — PROGRESS NOTES
Chief Complaint/Referring Provider:  Patient is being seen at the request of Dao Newell for a consultation for COPD    Interval History:     shortness of breath  - uses proair and incruse. Tobacco is at about 5-8 per day. Has \"tried about everything\" to quit, now precontemplative   February had AE COPD   More trouble with his breathing      Presenting HPI: This is a 66 yo WM seen by me 7 years ago for hilar lymphadenopathy, had EBUS which was reassuring, now here with with a four-week history of increased severe dyspnea worse with exertion associated with significant cough which is minimally productive, admitted to [facility] on [facility]/9/18 and treated for an acute exacerbation of COPD. He had minimal improvement in the hospital.  However he saw his primary care provider and was prescribed steroids and antibiotic and did notice some improvement although he tells me he is now somewhat worse again and he is definitely nowhere near his baseline of last December. His baseline and he does have some shortness of breath although it's only with significant exertion and was not troubling to him in the past.       reports that he has been smoking cigarettes and cigars. He started smoking about 2 years ago. He has a 13.50 pack-year smoking history. He has quit using smokeless tobacco.  His smokeless tobacco use included snuff. On average > 1-2 ppd    family history includes Cancer in his brother and mother; Diabetes in his sister; Emphysema in his father; Hypertension in his sister. Physical Exam:  There were no vitals taken for this visit.'  PHONE    Data  LDCT OCT 2020  Lungs/Pleura:  No pneumothorax or pleural effusion.  Emphysema.  Central   airways appear clear.  2 mm ground-glass nodule in the inferior aspect of the   right upper lobe, unchanged.  Mild atelectasis in the bases.  Mild   bronchiectasis in the lung bases.  Unchanged 2 mm nodule in the left upper   lobe.      L Hilar TBNA 2-8-11 - no malignant cells; FLOW normal polyclonal B/T cells    PFT 2-10-11 FEV1 2L 62% % DLCO 100%    Assessment:  · Moderate COPD on last PFT in 2011     Not addressed today   · Peripheral vascular disease s/p bypass surgery  · Obesity  · Lower lobe bronchiectasis on abdominal CT   · Cirrhosis  · Tobacco abuse, 1-3 ppd X 42 years, average about 2 ppd for 80 pack year hx, now 1/2 ppd    Plan:   · Incruse daily, did not tolerate Anoro, try adding flovent 110 BID today   · PRN albuterol  · LDCT for LCS in October 2022  · PFT in October 2022  · COVID vaccine recommended    Screening CT scan was considered in a lung cancer screening counseling and shared decision making visit today that included the following elements:    Eligibility: Age: 60. There are no signs or symptoms of lung cancer. Tobacco History 80 pack-years, quit zero years ago   Verbal counseling has been performed by me to include benefits and harms of screening, follow-up diagnostic testing, over-diagnosis, false positive rate, and total radiation exposure;    I have counseled on the importance of adherence to annual lung cancer LDCT screening, the impact of comorbidities and patient is willing to undergo diagnosis and treatment;    I have provided counseling on the importance of maintaining cigarette smoking abstinence if former smoker; or the importance of smoking cessation if current smoker and, if appropriate, furnishing of information about tobacco cessation interventions; and    I have furnished a written order for lung cancer screening with LDCT.    Order for Screening chest CT scan should be placed with documentation as below:   Beneficiary date of birth;    Actual pack - year smoking history (number) from above;    Current smoking status, and for former smokers, the number of years since quitting smoking from above  [facility] is [facility]   [place] ([place]) for Surjit Matamoros [id number]    Bhumi Conn is a 60 y.o. male evaluated via telephone on 4/13/2021. Consent: He and/or health care decision maker is aware that that he may receive a bill for this telephone service, depending on his insurance coverage, and has provided verbal consent to proceed: YES. I communicated with the patient and/or health care decision maker about: see interval history above. Details of this discussion including any medical advice provided: see plan above. Total Time: minutes: 11-20 minutes. I affirm this is a Patient Initiated Episode with a Patient who has not had a related appointment within my department in the past 7 days or scheduled within the next 24 hours. Patient identification was verified at the start of the visit: YES  Pursuant to the emergency declaration under the [facility], [address] [place] [address] waiver authority and the [employer] Act, this Telephone Visit was conducted with patient's (and/or legal guardian's) consent, to reduce the patient's risk of exposure to COVID-19 and provide necessary medical care.   The patient (and/or legal guardian) his advised to contact this office for worsening conditions or problems, and seek emergency medical treatment and/or call 911 if urgent care needed,

## 2021-04-14 ENCOUNTER — OFFICE VISIT (OUTPATIENT)
Dept: FAMILY MEDICINE CLINIC | Age: 66
End: 2021-04-14
Payer: COMMERCIAL

## 2021-04-14 VITALS
SYSTOLIC BLOOD PRESSURE: 148 MMHG | DIASTOLIC BLOOD PRESSURE: 64 MMHG | HEART RATE: 91 BPM | BODY MASS INDEX: 35.47 KG/M2 | OXYGEN SATURATION: 98 % | TEMPERATURE: 97.8 F | WEIGHT: 226.5 LBS

## 2021-04-14 DIAGNOSIS — E78.2 MIXED HYPERLIPIDEMIA: ICD-10-CM

## 2021-04-14 DIAGNOSIS — I87.2 VENOUS INSUFFICIENCY: ICD-10-CM

## 2021-04-14 DIAGNOSIS — R39.9 UTI SYMPTOMS: ICD-10-CM

## 2021-04-14 DIAGNOSIS — M15.9 OSTEOARTHRITIS OF MULTIPLE JOINTS, UNSPECIFIED OSTEOARTHRITIS TYPE: ICD-10-CM

## 2021-04-14 DIAGNOSIS — E11.42 DIABETIC POLYNEUROPATHY ASSOCIATED WITH TYPE 2 DIABETES MELLITUS (HCC): Primary | ICD-10-CM

## 2021-04-14 LAB
BILIRUBIN, POC: NEGATIVE
BLOOD URINE, POC: NEGATIVE
CHOLESTEROL, TOTAL: 154 MG/DL (ref 0–199)
CLARITY, POC: NORMAL
COLOR, POC: YELLOW
GLUCOSE URINE, POC: NORMAL
HDLC SERPL-MCNC: 29 MG/DL (ref 40–60)
KETONES, POC: NEGATIVE
LDL CHOLESTEROL CALCULATED: 95 MG/DL
LEUKOCYTE EST, POC: NEGATIVE
NITRITE, POC: NEGATIVE
PH, POC: 7
PROTEIN, POC: NEGATIVE
SPECIFIC GRAVITY, POC: 1.02
TRIGL SERPL-MCNC: 152 MG/DL (ref 0–150)
UROBILINOGEN, POC: 0.2
VLDLC SERPL CALC-MCNC: 30 MG/DL

## 2021-04-14 PROCEDURE — G8427 DOCREV CUR MEDS BY ELIG CLIN: HCPCS | Performed by: FAMILY MEDICINE

## 2021-04-14 PROCEDURE — 4004F PT TOBACCO SCREEN RCVD TLK: CPT | Performed by: FAMILY MEDICINE

## 2021-04-14 PROCEDURE — 99214 OFFICE O/P EST MOD 30 MIN: CPT | Performed by: FAMILY MEDICINE

## 2021-04-14 PROCEDURE — 81002 URINALYSIS NONAUTO W/O SCOPE: CPT | Performed by: FAMILY MEDICINE

## 2021-04-14 PROCEDURE — 3044F HG A1C LEVEL LT 7.0%: CPT | Performed by: FAMILY MEDICINE

## 2021-04-14 PROCEDURE — G8417 CALC BMI ABV UP PARAM F/U: HCPCS | Performed by: FAMILY MEDICINE

## 2021-04-14 PROCEDURE — 36415 COLL VENOUS BLD VENIPUNCTURE: CPT | Performed by: FAMILY MEDICINE

## 2021-04-14 PROCEDURE — 3017F COLORECTAL CA SCREEN DOC REV: CPT | Performed by: FAMILY MEDICINE

## 2021-04-14 PROCEDURE — 2022F DILAT RTA XM EVC RTNOPTHY: CPT | Performed by: FAMILY MEDICINE

## 2021-04-14 RX ORDER — DOXYCYCLINE HYCLATE 100 MG
100 TABLET ORAL 2 TIMES DAILY
Qty: 6 TABLET | Refills: 0 | Status: SHIPPED | OUTPATIENT
Start: 2021-04-14 | End: 2021-04-17

## 2021-04-14 NOTE — PROGRESS NOTES
Discussed the importance of establishing and updating an advanced directive. Patient does NOT have questions at this time. Discussed with: [x] Patient            [] Family             [] Other caregiver    Patient's medications, allergies, past medical, surgical, social and family histories were reviewed and updated asappropriate on 4/14/2021 at 10:35 AM.    ROS:  Review of Systems    All other systems reviewed and are negative except as noted above on 4/14/2021 at 10:35 AM. Additional review of systems may be scanned into the media section ofthis medical record. Any responses requiring further intervention were pursued.     Past Medical History:   Diagnosis Date    Bronchitis chronic     Chest pain     Chronic cough     Cirrhosis of liver (HCC) 01/01/2017    stage 4     COPD (chronic obstructive pulmonary disease) (HCC)     COPD (chronic obstructive pulmonary disease) (HCC)     Diabetes mellitus (HCC)     Gout     Hilar adenopathy     Hyperlipidemia     Hypertension     Knee pain, right     Numbness and tingling of leg     Osteoarthritis     Paresthesia of bilateral legs     PVD (peripheral vascular disease) (HCC)     Seizures (HCC)     ongoing, began after swine flu vaccination    Substance abuse ([facility] [address]      Family History   Problem Relation Age of Onset    Cancer Mother         Lung    Emphysema Father     Diabetes Sister     Hypertension Sister     Cancer Brother         throat    Asthma Neg Hx      Social History     Socioeconomic History    Marital status:      Spouse name: Not on file    Number of children: Not on file    Years of education: Not on file    Highest education level: Not on file   Occupational History    Occupation: [employer]     Comment: not working currently   Social Needs    Financial resource strain: Not on file    Food insecurity     Worry: Not on file     Inability: Not on file   [employer] needs     Medical: Not on file     Non-medical: Not (FREESTYLE FREEDOM LITE) w/Device KIT 1 kit by Does not apply route daily Yes Karl Roberts APRN - CNP   OXYGEN Inhale 2 L/min into the lungs nightly as needed (and daily prn)  Yes Historical Provider, MD   thiamine mononitrate 100 MG tablet TAKE 1 TABLET BY MOUTH DAILY  Patient not taking: Reported on 4/14/2021  Aries Townsend MD     Allergies   Allergen Reactions    Lisinopril Swelling    Ace Inhibitors Swelling    Influenza Vaccines      He states he was hospitalized when he received his first flu vaccine    Spiriva Handihaler [Tiotropium Bromide Monohydrate] Swelling     Tolerates both tudorza and incruse    Vilanterol        OBJECTIVE:  Estimated body mass index is 35.47 kg/m² as calculated from the following:    Height as of 3/9/21: 5' 7\" (1.702 m). Weight as of this encounter: 226 lb 8 oz (102.7 kg). Vitals:    04/14/21 1026 04/14/21 1035   BP: (!) 154/70 (!) 148/64   Site: Left Upper Arm Left Upper Arm   Position: Sitting    Pulse: 91    Temp: 97.8 °F (36.6 °C)    TempSrc: Temporal    SpO2: 98%    Weight: 226 lb 8 oz (102.7 kg)        Physical Exam  Vitals signs and nursing note reviewed. Constitutional:       General: He is not in acute distress. Appearance: He is well-developed. He is not diaphoretic. HENT:      Head: Normocephalic and atraumatic. Right Ear: External ear normal.      Left Ear: External ear normal.      Nose: Nose normal.   Eyes:      General: Lids are normal. No scleral icterus. Right eye: No discharge. Left eye: No discharge. Pupils: Pupils are equal, round, and reactive to light. Neck:      Thyroid: No thyromegaly. Vascular: No JVD. Cardiovascular:      Rate and Rhythm: Normal rate and regular rhythm. Heart sounds: Normal heart sounds. Pulmonary:      Effort: Pulmonary effort is normal. No respiratory distress. Breath sounds: Normal breath sounds. Abdominal:      Palpations: Abdomen is soft.  There is no hepatomegaly or splenomegaly. Tenderness: There is no abdominal tenderness. Musculoskeletal:      Right lower leg: No edema. Left lower leg: No edema. Skin:     General: Skin is warm and dry. Coloration: Skin is not pale. Findings: No erythema or rash. Comments: Turgor normal   Neurological:      Mental Status: He is oriented to person, place, and time. Psychiatric:         Mood and Affect: Mood normal.         Behavior: Behavior normal.         Thought Content: Thought content normal.         Judgment: Judgment normal.              ASSESSMENT PLAN      Diagnosis Orders   1. Diabetic polyneuropathy associated with type 2 diabetes mellitus ([address]     2. Mixed hyperlipidemia  LIPID PANEL   3. UTI symptoms  Culture, Urine    POCT Urinalysis no Micro    doxycycline hyclate (VIBRA-TABS) 100 MG tablet   4. Insulin dependent type 2 diabetes mellitus, uncontrolled ([address]     5. Osteoarthritis of multiple joints, unspecified osteoarthritis type  diclofenac sodium (VOLTAREN) 1 % GEL   6. Venous insufficiency     I believe patient has significant neuropathy of the lower extremities in his feet which may be multifactorial in etiology but diabetes is prominent. He receives gabapentin from pain management. I asked him to discuss the symptoms with the pain management doctor who he is to see in a week. There is still room to increase gabapentin, or possibly needs to be changed to Lyrica. He had some symptoms of burning with urination so he is already taken some doxycycline he had at home. Culture unlikely to show anything although we likely will culture it but go ahead and give him 3 more days of doxycycline to make sure at least he has 1 week of treatment. Last A1c was very acceptable. He is just putting diclofenac on his knee 3-4 times a day not measuring it.   We showed him a picture of the measuring card and demonstrated how he is to measure that and make sure he is putting on 4 g 3 times a day.  His lower extremity swelling is better with the furosemide. Has regular follow-up in August, follow-up sooner as needed. Patient should call the office immediately with new or ongoing signs or symptoms or worsening, or proceed to the emergency room. No changes in past medical history, past surgical history, social history, or family history were noted during the patient encounter unless specifically listed above. All updates of past medical history, past surgical history, social history, or family history were reviewed personally by me during the office visit. All problems listed in the assessment are stable unless noted otherwise. Medication profile reviewed personally by me during the visit. Medication side effects and possible impairments from medications were discussed as applicable. This document was prepared by a combination of typing and transcription through a voice recognition software. Scribe attestation: Maurice Williamson RN, am scribing for and in the presence of Jesus Hess MD. Electronically signed by Meli Vasquez RN on 4/14/2021 at 10:35 AM      Provider attestation:     I, Dr. Nena Okeefe, personally performed the services described in this documentation, as scribed by the above signed scribe in my presence, and it is both accurate and complete. I agree with the ROS and Past Histories independently gathered by the clinical support staff and the remaining scribed note accurately describes my personal service to the patient.       4/14/2021    11:02 AM

## 2021-04-15 LAB — URINE CULTURE, ROUTINE: NORMAL

## 2021-04-19 DIAGNOSIS — R30.0 BURNING WITH URINATION: Primary | ICD-10-CM

## 2021-04-19 DIAGNOSIS — R10.819 ABDOMINAL TENDERNESS IN FLANK: ICD-10-CM

## 2021-04-20 NOTE — TELEPHONE ENCOUNTER
Patient does not want home health, due to his SO coughing all the time and doesn't like people around. He would like to come in here for any labs you want checked and blood pressure check. I told him we would call Monday and let him know. Azithromycin Counseling:  I discussed with the patient the risks of azithromycin including but not limited to GI upset, allergic reaction, drug rash, diarrhea, and yeast infections.

## 2021-05-04 ENCOUNTER — HOSPITAL ENCOUNTER (OUTPATIENT)
Dept: CT IMAGING | Age: 66
Discharge: HOME | End: 2021-05-04
Payer: COMMERCIAL

## 2021-05-04 DIAGNOSIS — C67.3 MALIGNANT NEOPLASM OF ANTERIOR WALL OF URINARY BLADDER (HCC): ICD-10-CM

## 2021-05-04 PROCEDURE — 74176 CT ABD & PELVIS W/O CONTRAST: CPT

## 2021-05-11 RX ORDER — LANOLIN ALCOHOL/MO/W.PET/CERES
CREAM (GRAM) TOPICAL
Qty: 30 TABLET | Refills: 0 | Status: SHIPPED | OUTPATIENT
Start: 2021-05-11 | End: 2021-06-09

## 2021-05-18 ENCOUNTER — OFFICE VISIT (OUTPATIENT)
Dept: FAMILY MEDICINE CLINIC | Age: 66
End: 2021-05-18
Payer: COMMERCIAL

## 2021-05-18 VITALS
HEART RATE: 74 BPM | BODY MASS INDEX: 35 KG/M2 | DIASTOLIC BLOOD PRESSURE: 60 MMHG | OXYGEN SATURATION: 96 % | SYSTOLIC BLOOD PRESSURE: 124 MMHG | HEIGHT: 67 IN | WEIGHT: 223 LBS

## 2021-05-18 DIAGNOSIS — N32.0 BLADDER OUTLET OBSTRUCTION: ICD-10-CM

## 2021-05-18 DIAGNOSIS — K59.04 CHRONIC IDIOPATHIC CONSTIPATION: ICD-10-CM

## 2021-05-18 DIAGNOSIS — Z01.818 PRE-OP EXAM: Primary | ICD-10-CM

## 2021-05-18 DIAGNOSIS — I87.2 VENOUS INSUFFICIENCY: ICD-10-CM

## 2021-05-18 DIAGNOSIS — J47.9 BRONCHIECTASIS WITHOUT COMPLICATION (HCC): ICD-10-CM

## 2021-05-18 DIAGNOSIS — J41.8 MIXED SIMPLE AND MUCOPURULENT CHRONIC BRONCHITIS (HCC): ICD-10-CM

## 2021-05-18 DIAGNOSIS — C67.9 MALIGNANT NEOPLASM OF URINARY BLADDER, UNSPECIFIED SITE (HCC): ICD-10-CM

## 2021-05-18 PROCEDURE — 36415 COLL VENOUS BLD VENIPUNCTURE: CPT | Performed by: FAMILY MEDICINE

## 2021-05-18 PROCEDURE — G8417 CALC BMI ABV UP PARAM F/U: HCPCS | Performed by: FAMILY MEDICINE

## 2021-05-18 PROCEDURE — G8427 DOCREV CUR MEDS BY ELIG CLIN: HCPCS | Performed by: FAMILY MEDICINE

## 2021-05-18 PROCEDURE — 2022F DILAT RTA XM EVC RTNOPTHY: CPT | Performed by: FAMILY MEDICINE

## 2021-05-18 PROCEDURE — 93000 ELECTROCARDIOGRAM COMPLETE: CPT | Performed by: FAMILY MEDICINE

## 2021-05-18 PROCEDURE — 3023F SPIROM DOC REV: CPT | Performed by: FAMILY MEDICINE

## 2021-05-18 PROCEDURE — G8926 SPIRO NO PERF OR DOC: HCPCS | Performed by: FAMILY MEDICINE

## 2021-05-18 PROCEDURE — 99245 OFF/OP CONSLTJ NEW/EST HI 55: CPT | Performed by: FAMILY MEDICINE

## 2021-05-18 SDOH — ECONOMIC STABILITY: FOOD INSECURITY: WITHIN THE PAST 12 MONTHS, THE FOOD YOU BOUGHT JUST DIDN'T LAST AND YOU DIDN'T HAVE MONEY TO GET MORE.: NEVER TRUE

## 2021-05-18 NOTE — PATIENT INSTRUCTIONS

## 2021-05-18 NOTE — PROGRESS NOTES
PADS USE AS DIRECTED 100 each 5    FreeStyle Lancets MISC TEST 2 TIMES DAILY 100 each 5    albuterol sulfate  (90 Base) MCG/ACT inhaler INHALE 2 PUFFS EVERY 6 HOURS AS NEEDED FOR WHEEZING 8.5 g 5    furosemide (LASIX) 20 MG tablet Take 1 tablet by mouth daily 30 tablet 5    folic acid (FOLVITE) 1 MG tablet TAKE 1 TABLET BY MOUTH DAILY 30 tablet 5    dilTIAZem (TIAZAC) 360 MG extended release capsule TAKE 1 CAPSULE BY MOUTH DAILY 30 capsule 5    metoprolol succinate (TOPROL XL) 50 MG extended release tablet TAKE ONE TABLET BY MOUTH EVERY DAY 30 tablet 5    oxyCODONE (ROXICODONE) 5 MG immediate release tablet Take 5 mg by mouth every 4 hours as needed for Pain.  insulin glargine (LANTUS SOLOSTAR) 100 UNIT/ML injection pen Inject 15 Units into the skin nightly As needed      naloxone (NARCAN) 4 MG/0.1ML LIQD nasal spray 1 spray by Nasal route as needed for Opioid Reversal May repeat after 3 minutes if no or minimal response as directed      FREESTYLE LITE strip USE 1 STRIP IN VITRO ROUTE 2 TIMES DAILY 100 strip 0    gabapentin (NEURONTIN) 300 MG capsule Take 900 mg by mouth 3 times daily.  Blood Pressure Monitoring (BLOOD PRESSURE CUFF) MISC Please dispense insurance preference 1 each 0    Blood Glucose Monitoring Suppl (FREESTYLE FREEDOM LITE) w/Device KIT 1 kit by Does not apply route daily 1 kit 0    OXYGEN Inhale 2 L/min into the lungs nightly as needed (and daily prn)          This patient presents to the office today for a preoperative consultation at the request of surgeon, Dr. Vanessa Mas, who plans on performing cystoscopy with possible TURBT on May 27 at [facility].  The current problem began 1 year ago, and symptoms have been worsening with time. Conservative therapy: Yes: medication, which has been not very effective. .    Planned anesthesia: General   Known anesthesia problems: None   Bleeding risk: No recent or remote history of abnormal bleeding  Personal or FH of DVT/PE: No    Patient objection to receiving blood products: No    Patient Active Problem List   Diagnosis    Hilar adenopathy    Tobacco abuse    GERD (gastroesophageal reflux disease)    Chest pain    Gout    Paresthesia of bilateral legs    Right knee pain    Numbness and tingling of right leg    Osteoarthritis of multiple joints    Supraclavicular fossa fullness    Carpal tunnel syndrome of left wrist    Alcohol abuse    Hyponatremia    Hepatomegaly    Chronic alcoholic hepatitis    Ulnar neuropathy at elbow    Chronic pain    Malignant neoplasm of urinary bladder (HCC)    Mixed simple and mucopurulent chronic bronchitis (HCC)    Ulnar nerve entrapment    Mixed hyperlipidemia    Bladder outlet obstruction    Diabetic ketoacidosis without coma associated with type 2 diabetes mellitus (HCC)    Insulin dependent type 2 diabetes mellitus, uncontrolled ([address]    Bronchiectasis without complication (HCC)    Onychomycosis    Tinea pedis of both feet    Chronic idiopathic constipation    Alcoholic cirrhosis of liver without ascites ([address]    Family history of rectal cancer    Rectal bleeding    Polyp of colon    Diverticulosis of intestine without bleeding    Secondary esophageal varices without bleeding (HCC)    Erosive gastritis    Multiple duodenal ulcers    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)    Iron deficiency anemia    AVM (arteriovenous malformation) of small bowel, acquired    Uncontrolled hypertension    Severe claudication (HCC)    Hypercalcemia    MICHAEL (acute kidney injury) ([address]    Other constipation    Hypokalemia    Primary osteoarthritis of right knee    Venous insufficiency    Peripheral vascular disease ([address]    Diabetic polyneuropathy associated with type 2 diabetes mellitus ([address]       Past Medical History:   Diagnosis Date    Bronchitis chronic     Chest pain     Chronic cough     Cirrhosis of liver ([address] 01/01/2017    stage 4     COPD (chronic obstructive pulmonary disease) (HCC)     COPD (chronic obstructive pulmonary disease) (HCC)     Diabetes mellitus (HCC)     Gout     Hilar adenopathy     Hyperlipidemia     Hypertension     Knee pain, right     Numbness and tingling of leg     Osteoarthritis     Paresthesia of bilateral legs     PVD (peripheral vascular disease) (HCC)     Seizures (HCC)     ongoing, began after swine flu vaccination    Substance abuse [facility])      Past Surgical History:   Procedure Laterality Date    AORTO-FEMORAL BYPASS GRAFT  08/2020    AORTO-ILIAC BYPASS GRAFT N/A 8/24/2020    AORTOBIFEMORAL BYPASS GRAFT performed by Lyssa Lewis MD at [address] [facility]  2/7/2011    bronch with EBUS    COLONOSCOPY N/A 7/23/2018    COLONOSCOPY WITH BIOPSY performed by Parul King MD at [address] [facility] COLONOSCOPY N/A 7/23/2018    COLONOSCOPY POLYPECTOMY SNARE/COLD BIOPSY performed by Parul King MD at [address] [facility]  2/4/15    Urethral Dilatation, Resection of Bladder Tumor    CYSTOSCOPY  2/24/2016    fulgeration of bladder tumor    ELBOW SURGERY Left     ENDOSCOPY, SMALL BOWEL N/A 2/20/2019    BOWEL SMALL CAPSULE ENDOSCOPY performed by Parul King MD at [facility] [phone]    cystoscopy, bladder biopsy    OTHER SURGICAL HISTORY  09/09/2015    cystoscopy, urethral dilatation, bladder tumor follow up   [address] N/A 7/23/2018    EGD BIOPSY performed by Parul King MD at [address] [facility]     Family History   Problem Relation Age of Onset    Cancer Mother         Lung    Emphysema Father     Diabetes Sister     Hypertension Sister     Cancer Brother         throat    Asthma Neg Hx      Social History     Socioeconomic History    Marital status:      Spouse name: Not on file    Number of children: Not on file    Years of education: Not on file   Mary Fitch education level: Not on file   Occupational History    Occupation: [employer]     Comment: not working currently   Tobacco Use    Smoking status: Current Every Day Smoker     Packs/day: 0.00     Years: 54.00     Pack years: 0.00     Types: Cigarettes, Cigars     Start date: 9/4/2018    Smokeless tobacco: Former User     Types: Snuff    Tobacco comment: he is in the process of quitting, hasnt smoked in 3 days   Vaping Use    Vaping Use: Former    Substances: Always   Substance and Sexual Activity    Alcohol use: Not Currently     Alcohol/week: 2.0 standard drinks     Types: 2 Cans of beer per week    Drug use: No    Sexual activity: Yes     Partners: Female   Other Topics Concern    Not on file   Social History Narrative    Not on file     Social Determinants of Health     Financial Resource Strain: Low Risk     Difficulty of Paying Living Expenses: Not hard at all   Food Insecurity: No Food Insecurity    Worried About [address] in the Last Year: Never true    [address] [facility] [place] in the Last Year: Never true   Transportation Needs:     Lack of Transportation (Medical):  Lack of Transportation (Non-Medical):    Physical Activity:     Days of Exercise per Week:     Minutes of Exercise per Session:    Stress:     Feeling of Stress :    Social Connections:     Frequency of Communication with Friends and Family:     Frequency of Social Gatherings with Friends and Family:     Attends [employer] Services:     Active Member of Clubs or Organizations:     Attends Club or Organization Meetings:     Marital Status:    Intimate Partner Violence:     Fear of Current or Ex-Partner:     Emotionally Abused:     Physically Abused:     Sexually Abused:        Review of Systems  A comprehensive review of systems was negative except for what was noted in the HPI. Physical Exam   Constitutional: He is oriented to person, place, and time. He appears well-developed and well-nourished. No distress. insulin          ___   Orthopedic, supra-inguinal vascular, intra-thoracic, intra-abdominal surgical site ___       0-1 = low risk  ? 2 = elevated risk    Current medications which may produce withdrawal symptoms if withheld perioperatively: oxycodone 5mg      Plan:     1. Preoperative workup as follows: EKG,CBC, BMP  2. Change in medication regimen before surgery:  Take metoprolol and diltiazem on morning of surgery with sip of water, and hold all other medications until after surgery. The night before surgery take only 8 units of the lantus the night before surgery. 3. Prophylaxis for cardiac events with perioperative beta-blockers: Currently taking  metoprolol  ACC/AHA indications for pre-operative beta-blocker use:    · Vascular surgery with history of postitive stress test  · Intermediate or high risk surgery with history of CAD   · Intermediate or high risk surgery with multiple clinical predictors of CAD- 2 of the following: history of compensated or prior heart failure, history of cerebrovascular disease, DM, or renal insufficiency    Routine administration of higher-dose, long-acting metoprolol in beta-blockernaïve patients on the day of surgery, and in the absence of dose titration is associated with an overall increase in mortality. Beta-blockers should be started days to weeks prior to surgery and titrated to pulse < 70.  4. Deep vein thrombosis prophylaxis: regimen to be chosen by surgical team  5. No contraindications to planned surgery        Thank you for the referral,      SKIP Solitario M.D., [facility]    5/18/2021    2:20 PM    Scribe attestation: Nancy Alvarez RN, am scribing for and in the presence of Anthony Ha MD. Electronically signed by Diane Reeves RN on 5/18/2021 at 2:20 PM    (Copy of consult was returned to the requesting provider on the day of completion.)

## 2021-05-19 ENCOUNTER — TELEPHONE (OUTPATIENT)
Dept: FAMILY MEDICINE CLINIC | Age: 66
End: 2021-05-19

## 2021-05-19 DIAGNOSIS — D64.9 ANEMIA, UNSPECIFIED TYPE: Primary | ICD-10-CM

## 2021-05-19 LAB
ANION GAP SERPL CALCULATED.3IONS-SCNC: 10 MMOL/L (ref 3–16)
ANISOCYTOSIS: ABNORMAL
BASOPHILS ABSOLUTE: 0 K/UL (ref 0–0.2)
BASOPHILS RELATIVE PERCENT: 0.3 %
BUN BLDV-MCNC: 9 MG/DL (ref 7–20)
CALCIUM SERPL-MCNC: 8.7 MG/DL (ref 8.3–10.6)
CHLORIDE BLD-SCNC: 97 MMOL/L (ref 99–110)
CO2: 30 MMOL/L (ref 21–32)
CREAT SERPL-MCNC: 0.8 MG/DL (ref 0.8–1.3)
EOSINOPHILS ABSOLUTE: 0.2 K/UL (ref 0–0.6)
EOSINOPHILS RELATIVE PERCENT: 2.3 %
GFR AFRICAN AMERICAN: >60
GFR NON-AFRICAN AMERICAN: >60
GLUCOSE BLD-MCNC: 107 MG/DL (ref 70–99)
HCT VFR BLD CALC: 27.5 % (ref 40.5–52.5)
HEMATOLOGY PATH CONSULT: NORMAL
HEMATOLOGY PATH CONSULT: YES
HEMOGLOBIN: 8 G/DL (ref 13.5–17.5)
HYPOCHROMIA: ABNORMAL
LYMPHOCYTES ABSOLUTE: 2.1 K/UL (ref 1–5.1)
LYMPHOCYTES RELATIVE PERCENT: 22.8 %
MCH RBC QN AUTO: 18.3 PG (ref 26–34)
MCHC RBC AUTO-ENTMCNC: 29 G/DL (ref 31–36)
MCV RBC AUTO: 63 FL (ref 80–100)
MICROCYTES: ABNORMAL
MONOCYTES ABSOLUTE: 0.8 K/UL (ref 0–1.3)
MONOCYTES RELATIVE PERCENT: 8.8 %
NEUTROPHILS ABSOLUTE: 6.1 K/UL (ref 1.7–7.7)
NEUTROPHILS RELATIVE PERCENT: 65.8 %
OVALOCYTES: ABNORMAL
PDW BLD-RTO: 21.9 % (ref 12.4–15.4)
PLATELET # BLD: 288 K/UL (ref 135–450)
PLATELET SLIDE REVIEW: ADEQUATE
PMV BLD AUTO: 8.3 FL (ref 5–10.5)
POTASSIUM SERPL-SCNC: 3.6 MMOL/L (ref 3.5–5.1)
RBC # BLD: 4.37 M/UL (ref 4.2–5.9)
SLIDE REVIEW: ABNORMAL
SODIUM BLD-SCNC: 137 MMOL/L (ref 136–145)
WBC # BLD: 9.4 K/UL (ref 4–11)

## 2021-05-19 NOTE — TELEPHONE ENCOUNTER
Pt stopped by office to  a copy of lab results and referrals. Pt asked that I call [facility] to inform them of postponing the surgery. Called and left a message with  who said she'd get the message to doctor's surgery scheduler.

## 2021-06-08 NOTE — TELEPHONE ENCOUNTER
Last office visit was 5/18/21  Future Appointments   Date Time Provider Alina Friend   8/10/2021  1:20 PM [facility] [place] [facility]   8/26/2021 10:10 AM Buffy Gallego MD [facility]   10/20/2021  2:00 PM [facility] [place] [facility]   10/20/2021  3:00 PM [facility] PULMONARY FUNCTION TESTING [facility]   10/20/2021  4:00 PM [facility]

## 2021-06-09 RX ORDER — LANOLIN ALCOHOL/MO/W.PET/CERES
CREAM (GRAM) TOPICAL
Qty: 30 TABLET | Refills: 0 | Status: SHIPPED | OUTPATIENT
Start: 2021-06-09 | End: 2021-08-26

## 2021-07-26 RX ORDER — FUROSEMIDE 20 MG/1
20 TABLET ORAL DAILY
Qty: 30 TABLET | Refills: 11 | Status: ON HOLD | OUTPATIENT
Start: 2021-07-26 | End: 2022-05-11 | Stop reason: HOSPADM

## 2021-07-26 NOTE — TELEPHONE ENCOUNTER
Last OV 5/18/21  Future Appointments   Date Time Provider Alina Friend   8/10/2021  1:20 PM Dia Medel MD [facility] [place] [facility]   8/26/2021 10:10 AM Akua Kline MD [facility]   10/20/2021  2:00 PM [facility] [place] [facility]   10/20/2021  3:00 PM [facility] [place] FUNCTION TESTING [facility] [place] [facility]   10/20/2021  4:00 PM [facility]

## 2021-08-10 ENCOUNTER — OFFICE VISIT (OUTPATIENT)
Dept: FAMILY MEDICINE CLINIC | Age: 66
End: 2021-08-10
Payer: COMMERCIAL

## 2021-08-10 VITALS
HEART RATE: 65 BPM | DIASTOLIC BLOOD PRESSURE: 70 MMHG | WEIGHT: 219 LBS | OXYGEN SATURATION: 95 % | BODY MASS INDEX: 34.37 KG/M2 | HEIGHT: 67 IN | SYSTOLIC BLOOD PRESSURE: 144 MMHG

## 2021-08-10 DIAGNOSIS — I10 BENIGN ESSENTIAL HTN: Primary | ICD-10-CM

## 2021-08-10 DIAGNOSIS — J41.8 MIXED SIMPLE AND MUCOPURULENT CHRONIC BRONCHITIS (HCC): ICD-10-CM

## 2021-08-10 DIAGNOSIS — E78.2 MIXED HYPERLIPIDEMIA: ICD-10-CM

## 2021-08-10 DIAGNOSIS — Z72.0 TOBACCO ABUSE: ICD-10-CM

## 2021-08-10 DIAGNOSIS — E11.42 DIABETIC POLYNEUROPATHY ASSOCIATED WITH TYPE 2 DIABETES MELLITUS (HCC): ICD-10-CM

## 2021-08-10 DIAGNOSIS — F41.9 ANXIETY: ICD-10-CM

## 2021-08-10 DIAGNOSIS — D64.9 ANEMIA, UNSPECIFIED TYPE: ICD-10-CM

## 2021-08-10 DIAGNOSIS — F41.8 SITUATIONAL ANXIETY: ICD-10-CM

## 2021-08-10 DIAGNOSIS — K21.9 GASTROESOPHAGEAL REFLUX DISEASE WITHOUT ESOPHAGITIS: ICD-10-CM

## 2021-08-10 DIAGNOSIS — M15.9 OSTEOARTHRITIS OF MULTIPLE JOINTS, UNSPECIFIED OSTEOARTHRITIS TYPE: ICD-10-CM

## 2021-08-10 DIAGNOSIS — R20.2 PARESTHESIA OF BILATERAL LEGS: ICD-10-CM

## 2021-08-10 PROCEDURE — 4004F PT TOBACCO SCREEN RCVD TLK: CPT | Performed by: FAMILY MEDICINE

## 2021-08-10 PROCEDURE — 3044F HG A1C LEVEL LT 7.0%: CPT | Performed by: FAMILY MEDICINE

## 2021-08-10 PROCEDURE — G8417 CALC BMI ABV UP PARAM F/U: HCPCS | Performed by: FAMILY MEDICINE

## 2021-08-10 PROCEDURE — G8926 SPIRO NO PERF OR DOC: HCPCS | Performed by: FAMILY MEDICINE

## 2021-08-10 PROCEDURE — 99214 OFFICE O/P EST MOD 30 MIN: CPT | Performed by: FAMILY MEDICINE

## 2021-08-10 PROCEDURE — G8427 DOCREV CUR MEDS BY ELIG CLIN: HCPCS | Performed by: FAMILY MEDICINE

## 2021-08-10 PROCEDURE — 36415 COLL VENOUS BLD VENIPUNCTURE: CPT | Performed by: FAMILY MEDICINE

## 2021-08-10 PROCEDURE — 2022F DILAT RTA XM EVC RTNOPTHY: CPT | Performed by: FAMILY MEDICINE

## 2021-08-10 PROCEDURE — 3023F SPIROM DOC REV: CPT | Performed by: FAMILY MEDICINE

## 2021-08-10 PROCEDURE — 3017F COLORECTAL CA SCREEN DOC REV: CPT | Performed by: FAMILY MEDICINE

## 2021-08-10 RX ORDER — DULOXETIN HYDROCHLORIDE 30 MG/1
CAPSULE, DELAYED RELEASE ORAL
Qty: 10 CAPSULE | Refills: 0 | Status: SHIPPED | OUTPATIENT
Start: 2021-08-10 | End: 2022-02-24 | Stop reason: DRUGHIGH

## 2021-08-10 RX ORDER — DULOXETIN HYDROCHLORIDE 60 MG/1
60 CAPSULE, DELAYED RELEASE ORAL DAILY
Qty: 30 CAPSULE | Refills: 5 | Status: SHIPPED | OUTPATIENT
Start: 2021-08-10 | End: 2022-02-07

## 2021-08-10 NOTE — PROGRESS NOTES
Chief Complaint   Patient presents with    Hypertension    Diabetes    Chronic Pain    Other     patient has multiple medical complaints        Internal Administration   First Dose      Second Dose           Last COVID Lab SARS-CoV-2, ZAHIDA (no units)   Date Value   2020 NOT DETECTED             Wt Readings from Last 3 Encounters:   08/10/21 219 lb (99.3 kg)   21 222 lb (100.7 kg)   21 223 lb (101.2 kg)     BP Readings from Last 3 Encounters:   08/10/21 (!) 144/70   21 124/60   21 (!) 148/64      Lab Results   Component Value Date    LABA1C 6.4 2021    LABA1C 6.6 2020    LABA1C 6.0 2020       HPI:  Sebastián Lal is a 60 y.o. (: 1955) here today for     patient states that he did see GI and it was determined that he just had some polyps in the colon but no cancer. He went to hematology for his anemia and they will be following up with him in 6 months. He states that he does have increased pain in his right leg with his neuropathy. He does taking gabapentin daily. He continues to see pain management for his chronic pain and his neuropathy. He states that he did bring up that the neuropathy is getting worse to his pain doctor but they were not going to increase his medication. Stated we can try Cymbalta to help with the neuropathy and his mood. [x] Patient has completed an advance directive  [] Patient has NOT completed an advanced directive  [x] Patient has a documented healthcare surrogate  [] Patient does NOT have a documented healthcare surrogate  [] Discussed the importance of establishing and updating an advanced directive. Patient has questions at this time and those were answered. [x] Discussed the importance of establishing and updating an advanced directive. Patient does NOT have questions at this time.     Discussed with: [x] Patient            [] Family             [] Other caregiver    Patient's medications, allergies, past medical, surgical, social and family histories were reviewed and updated asappropriate on 8/10/2021 at 1:33 PM.    ROS:  Review of Systems    All other systems reviewed and are negative except as noted above on 8/10/2021 at 1:33 PM. Additional review of systems may [facility] Any responses requiring further intervention were pursued.     Past Medical History:   Diagnosis Date    Bronchitis chronic     Chest pain     Chronic cough     Cirrhosis of liver (HCC) 01/01/2017    stage 4     COPD (chronic obstructive pulmonary disease) (HCC)     COPD (chronic obstructive pulmonary disease) (HCC)     Diabetes mellitus (HCC)     Gout     Hilar adenopathy     Hyperlipidemia     Hypertension     Knee pain, right     Numbness and tingling of leg     Osteoarthritis     Paresthesia of bilateral legs     PVD (peripheral vascular disease) (HCC)     Seizures (HCC)     ongoing, began after swine flu vaccination    Substance abuse ([facility] [address]      Family History   Problem Relation Age of Onset    Cancer Mother         Lung    Emphysema Father     Diabetes Sister     Hypertension Sister     Cancer Brother         throat    Asthma Neg Hx      Social History     Socioeconomic History    Marital status:      Spouse name: Not on file    Number of children: Not on file    Years of education: Not on file    Highest education level: Not on file   Occupational History    Occupation: [employer]     Comment: not working currently   Tobacco Use    Smoking status: Current Every Day Smoker     Packs/day: 0.00     Years: 54.00     Pack years: 0.00     Types: Cigarettes, Cigars     Start date: 9/4/2018    Smokeless tobacco: Former User     Types: Snuff   Vaping Use    Vaping Use: Former    Substances: Always   Substance and Sexual Activity    Alcohol use: Not Currently     Alcohol/week: 2.0 standard drinks     Types: 2 Cans of beer per week    Drug use: No    Sexual activity: Yes     Partners: Female   Other Topics Concern    Not on file   Social History Narrative    Not on file     Social Determinants of Health     Financial Resource Strain: Low Risk     Difficulty of Paying Living Expenses: Not hard at all   Food Insecurity: No Food Insecurity    Worried About Running Out of Food in the Last Year: Never true    [address] in the Last Year: Never true   Transportation Needs:     Lack of Transportation (Medical):  Lack of Transportation (Non-Medical):    Physical Activity:     Days of Exercise per Week:     Minutes of Exercise per Session:    Stress:     Feeling of Stress :    Social Connections:     Frequency of Communication with Friends and Family:     Frequency of Social Gatherings with Friends and Family:     Attends Rastafari Services:     Active Member of Clubs or Organizations:     Attends Club or Organization Meetings:     Marital Status:    Intimate Partner Violence:     Fear of Current or Ex-Partner:     Emotionally Abused:     Physically Abused:     Sexually Abused:      Prior to Visit Medications    Medication Sig Taking?  Authorizing Provider   folic acid (FOLVITE) 1 MG tablet TAKE 1 TABLET BY MOUTH DAILY Yes Yolande Worley MD   furosemide (LASIX) 20 MG tablet TAKE 1 TABLET BY MOUTH DAILY Yes Yolande Worley MD   dilTIAZem DE LA O [facility]) 360 MG extended release capsule TAKE 1 CAPSULE BY MOUTH DAILY Yes Yolande Worley MD   metoprolol succinate (TOPROL XL) 50 MG extended release tablet TAKE ONE TABLET BY MOUTH EVERY DAY Yes Yolande Worley MD   thiamine 100 MG tablet TAKE 1 TABLET BY MOUTH DAILY Yes Yolande Worley MD   PENTIPS 31G X 8 MM [address] Yes Yolande Worley MD   atorvastatin (LIPITOR) 40 MG tablet TAKE 1 TABLET BY MOUTH DAILY Yes Yolande Worley MD   diclofenac sodium (VOLTAREN) 1 % GEL Apply 4mg of the gel three times daily to the affected knee Yes Rubina Murphy MD   fluticasone (FLOVENT HFA) 110 MCG/ACT inhaler Inhale 1 puff into the lungs 2 times daily Yes Marvin Rogers MD   ondansetron (ZOFRAN) 4 MG tablet Take 1 tablet by mouth every 6 hours as needed for Nausea or Vomiting Yes NAYELY Pedroza CNP   pantoprazole (PROTONIX) 40 MG tablet TAKE ONE TABLET BY MOUTH EVERY MORNING BEFORE BREAKFAST Yes NAYELY Pedroza CNP   allopurinol (ZYLOPRIM) 300 MG tablet TAKE 1 TABLET BY MOUTH DAILY Yes NAYELY Pedroza CNP   Alcohol Swabs (ALCOHOL PREP) 70 % PADS USE AS DIRECTED Yes MD Erica SandersStyle Lancets [address] [facility] TEST 2 TIMES DAILY Yes Rubina Murphy MD   albuterol sulfate  (90 Base) MCG/ACT inhaler INHALE 2 PUFFS EVERY 6 HOURS AS NEEDED FOR WHEEZING Yes Rubina Murphy MD   oxyCODONE (ROXICODONE) 5 MG immediate release tablet Take 5 mg by mouth every 4 hours as needed for Pain. Yes Historical Provider, MD   insulin glargine (LANTUS SOLOSTAR) 100 UNIT/ML injection pen Inject 15 Units into the skin nightly As needed Yes Historical Provider, MD   naloxone (NARCAN) 4 MG/0.1ML LIQD nasal spray 1 spray by Nasal route as needed for Opioid Reversal May repeat after 3 minutes if no or minimal response as directed Yes Historical Provider, MD   FREESTYLE LITE strip USE 1 STRIP IN VITRO ROUTE 2 TIMES DAILY Yes Rubina Murphy MD   gabapentin (NEURONTIN) 300 MG capsule Take 900 mg by mouth 3 times daily.   Yes Historical Provider, MD   Blood Pressure Monitoring (BLOOD PRESSURE CUFF) MISC Please dispense insurance preference Yes Rubina Murphy MD   Blood Glucose Monitoring Suppl (FREESTYLE FREEDOM LITE) w/Device KIT 1 kit by Does not apply route daily Yes NAYELY Pedroza CNP   OXYGEN Inhale 2 L/min into the lungs nightly as needed (and daily prn)  Yes Historical Provider, MD     Allergies   Allergen Reactions    Lisinopril Swelling    Ace Inhibitors Swelling    Influenza Vaccines      He states he was hospitalized when he received his first flu vaccine    Spiriva Handihaler [Tiotropium Bromide Monohydrate] Swelling     Tolerates both tudorza and incruse    Vilanterol        OBJECTIVE:  Estimated body mass index is 34.3 kg/m² as calculated from the following:    Height as of this encounter: 5' 7\" (1.702 m). Weight as of this encounter: 219 lb (99.3 kg). Vitals:    08/10/21 1321   BP: (!) 144/70   Site: Left Upper Arm   Position: Sitting   Cuff Size: Medium Adult   Pulse: 65   SpO2: 95%   Weight: 219 lb (99.3 kg)   Height: 5' 7\" (1.702 m)       Physical Exam  Vitals and nursing note reviewed. Constitutional:       General: He is not in acute distress. Appearance: He is well-developed. He is not diaphoretic. HENT:      Head: Normocephalic and atraumatic. Right Ear: External ear normal.      Left Ear: External ear normal.      Nose: Nose normal.   Eyes:      General: Lids are normal. No scleral icterus. Right eye: No discharge. Left eye: No discharge. Pupils: Pupils are equal, round, and reactive to light. Neck:      Thyroid: No thyromegaly. Vascular: No JVD. Cardiovascular:      Rate and Rhythm: Normal rate and regular rhythm. Heart sounds: Normal heart sounds. Pulmonary:      Effort: Pulmonary effort is normal. No respiratory distress. Breath sounds: Normal breath sounds. Abdominal:      Palpations: Abdomen is soft. There is no hepatomegaly or splenomegaly. Tenderness: There is no abdominal tenderness. Musculoskeletal:      Right lower leg: No edema. Left lower leg: No edema. Skin:     General: Skin is warm and dry. Coloration: Skin is not pale. Findings: No erythema or rash. Comments: Turgor normal   Neurological:      Mental Status: He is oriented to person, place, and time.    Psychiatric:         Mood and Affect: Mood normal.         Behavior: Behavior normal. Thought Content: Thought content normal.         Judgment: Judgment normal.              ASSESSMENT PLAN      Diagnosis Orders   1. Benign essential HTN  BASIC METABOLIC PANEL   2. Insulin dependent type 2 diabetes mellitus, uncontrolled ([facility] [address]  HEMOGLOBIN C3A    BASIC METABOLIC PANEL   3. Anemia, unspecified type  CBC WITH AUTO DIFFERENTIAL   4. Diabetic polyneuropathy associated with type 2 diabetes mellitus (HCC)  DULoxetine (CYMBALTA) 30 MG extended release capsule    DULoxetine (CYMBALTA) 60 MG extended release capsule   5. Anxiety  DULoxetine (CYMBALTA) 30 MG extended release capsule    DULoxetine (CYMBALTA) 60 MG extended release capsule   6. Tobacco abuse     7. Gastroesophageal reflux disease without esophagitis     8. Paresthesia of bilateral legs     9. Osteoarthritis of multiple joints, unspecified osteoarthritis type     10. Mixed simple and mucopurulent chronic bronchitis ([address]     11. Mixed hyperlipidemia     12. Situational anxiety  DULoxetine (CYMBALTA) 30 MG extended release capsule    DULoxetine (CYMBALTA) 60 MG extended release capsule   Blood pressure was acceptable. No symptoms of elevated sugar. Patient had polyp found on colonoscopy tubular adenoma. Markedly iron deficient. Received IV iron. Unfortunately someone else is letter from [facility] was in the chart indicating colon cancer. Patient was markedly taken back when I discussed this, and only on closer investigation did we discover that the patient had 2 records from [facility] from the same date of service in epic. The correct letter does indicate the iron deficiency anemia while the incorrect letter notes another patient who has colon cancer. That letter was removed from the record and I apologize profusely to the patient about mentioning that he had colon cancer. He seemed to be understanding fortunately.   Tobacco abuse - Pt will not quit despite understanding of risks of continued tobacco use, including cancer, heart attacks, strokes or death.  Flux controlled. He is on 2700 mg of gabapentin per day. Still has the burning in his legs. Suggest he could talk to pain management who prescribes the gabapentin patient states he already has and they do not want change the dose. Also significant situational anxiety related to multiple stressors in his life including the death of his significant other. We will start Cymbalta which should help his nerves and also may help the neuropathy. COPD at baseline. Continue lipid monitoring as needed. Follow-up 2 months patient     Patient should call the office immediately with new or ongoing signs or symptoms or worsening, or proceed to the emergency room. No changes in past medical history, past surgical history, social history, or family history were noted during the patient encounter unless specifically listed above. All updates of past medical history, past surgical history, social history, or family history were reviewed personally by me during the office visit. All problems listed in the assessment are stable unless noted otherwise. Medication profile reviewed personally by me during the visit. Medication side effects and possible impairments from medications were discussed as applicable. This document was prepared by a combination of typing and transcription through a voice recognition software. Scribe attestation: Tashia Nelson RN, am scribing for and in the presence of Mj Alvarez MD. Electronically signed by Dakotah Smith RN on 8/10/2021 at 1:33 PM      Provider attestation:     I, Dr. Anita Galloway, personally performed the services described in this documentation, as scribed by the above signed scribe in my presence, and it is both accurate and complete. I agree with the ROS and Past Histories independently gathered by the clinical support staff and the remaining scribed note accurately describes my personal service to the patient.       8/10/2021    6:45 PM

## 2021-08-10 NOTE — PATIENT INSTRUCTIONS

## 2021-08-11 LAB
ANION GAP SERPL CALCULATED.3IONS-SCNC: 11 MMOL/L (ref 3–16)
BASOPHILS ABSOLUTE: 0.1 K/UL (ref 0–0.2)
BASOPHILS RELATIVE PERCENT: 0.7 %
BUN BLDV-MCNC: 8 MG/DL (ref 7–20)
CALCIUM SERPL-MCNC: 9.9 MG/DL (ref 8.3–10.6)
CHLORIDE BLD-SCNC: 94 MMOL/L (ref 99–110)
CO2: 30 MMOL/L (ref 21–32)
CREAT SERPL-MCNC: 0.8 MG/DL (ref 0.8–1.3)
EOSINOPHILS ABSOLUTE: 0.2 K/UL (ref 0–0.6)
EOSINOPHILS RELATIVE PERCENT: 2.2 %
ESTIMATED AVERAGE GLUCOSE: 125.5 MG/DL
GFR AFRICAN AMERICAN: >60
GFR NON-AFRICAN AMERICAN: >60
GLUCOSE BLD-MCNC: 127 MG/DL (ref 70–99)
HBA1C MFR BLD: 6 %
HCT VFR BLD CALC: 42.6 % (ref 40.5–52.5)
HEMOGLOBIN: 13.4 G/DL (ref 13.5–17.5)
LYMPHOCYTES ABSOLUTE: 2.4 K/UL (ref 1–5.1)
LYMPHOCYTES RELATIVE PERCENT: 25 %
MCH RBC QN AUTO: 24.7 PG (ref 26–34)
MCHC RBC AUTO-ENTMCNC: 31.5 G/DL (ref 31–36)
MCV RBC AUTO: 78.6 FL (ref 80–100)
MONOCYTES ABSOLUTE: 0.6 K/UL (ref 0–1.3)
MONOCYTES RELATIVE PERCENT: 6.3 %
NEUTROPHILS ABSOLUTE: 6.4 K/UL (ref 1.7–7.7)
NEUTROPHILS RELATIVE PERCENT: 65.8 %
PDW BLD-RTO: 26.5 % (ref 12.4–15.4)
PLATELET # BLD: 315 K/UL (ref 135–450)
PMV BLD AUTO: 8.3 FL (ref 5–10.5)
POTASSIUM SERPL-SCNC: 3.7 MMOL/L (ref 3.5–5.1)
RBC # BLD: 5.42 M/UL (ref 4.2–5.9)
SODIUM BLD-SCNC: 135 MMOL/L (ref 136–145)
WBC # BLD: 9.7 K/UL (ref 4–11)

## 2021-08-20 RX ORDER — INSULIN GLARGINE 100 [IU]/ML
INJECTION, SOLUTION SUBCUTANEOUS
Qty: 15 ML | Refills: 0 | Status: SHIPPED | OUTPATIENT
Start: 2021-08-20 | End: 2021-12-27

## 2021-08-20 RX ORDER — CALCIUM CITRATE/VITAMIN D3 200MG-6.25
TABLET ORAL
Qty: 100 STRIP | Refills: 0 | Status: SHIPPED | OUTPATIENT
Start: 2021-08-20 | End: 2022-04-06

## 2021-09-07 DIAGNOSIS — J41.8 MIXED SIMPLE AND MUCOPURULENT CHRONIC BRONCHITIS (HCC): ICD-10-CM

## 2021-09-07 RX ORDER — ALBUTEROL SULFATE 90 UG/1
AEROSOL, METERED RESPIRATORY (INHALATION)
Qty: 18 G | Refills: 0 | Status: SHIPPED | OUTPATIENT
Start: 2021-09-07 | End: 2021-10-15

## 2021-09-07 RX ORDER — UMECLIDINIUM 62.5 UG/1
AEROSOL, POWDER ORAL
Qty: 3 EACH | Refills: 3 | Status: SHIPPED | OUTPATIENT
Start: 2021-09-07 | End: 2021-09-08 | Stop reason: SDUPTHER

## 2021-09-08 RX ORDER — UMECLIDINIUM 62.5 UG/1
1 AEROSOL, POWDER ORAL DAILY
Qty: 3 EACH | Refills: 3 | Status: SHIPPED | OUTPATIENT
Start: 2021-09-08 | End: 2022-02-22

## 2021-09-10 ENCOUNTER — TELEPHONE (OUTPATIENT)
Dept: ADMINISTRATIVE | Age: 66
End: 2021-09-10

## 2021-09-10 NOTE — TELEPHONE ENCOUNTER
PA COVER MY MEDS  Medication:Incruse Ellipta 62. 5MCG/INH aerosol powder  Key: [id number] - PA Case ID: [id number]  Status:PENDING

## 2021-09-13 RX ORDER — ALLOPURINOL 300 MG/1
TABLET ORAL
Qty: 30 TABLET | Refills: 0 | Status: SHIPPED | OUTPATIENT
Start: 2021-09-13 | End: 2021-10-12

## 2021-09-13 RX ORDER — PANTOPRAZOLE SODIUM 40 MG/1
TABLET, DELAYED RELEASE ORAL
Qty: 30 TABLET | Refills: 0 | Status: SHIPPED | OUTPATIENT
Start: 2021-09-13 | End: 2021-10-12

## 2021-09-13 NOTE — TELEPHONE ENCOUNTER
Last visit was 8/10/21  Future Appointments   Date Time Provider Alina Friend   10/20/2021  2:00 PM [facility] CT MAIN [facility] [place] [facility]   10/20/2021  3:00 PM [facility] PULMONARY FUNCTION TESTING [facility] PFT [facility]   10/20/2021  4:00 PM Ness Graves MD CLERM PULM [facility]   12/7/2021  2:00 PM MD Edwin Quiñones

## 2021-09-13 NOTE — TELEPHONE ENCOUNTER
The medication is APPROVED. If this requires a response please respond to the pool ( P [facility] [address] [facility]). Thank you please advise patient. \

## 2021-10-12 RX ORDER — DILTIAZEM HYDROCHLORIDE 360 MG/1
360 CAPSULE, EXTENDED RELEASE ORAL DAILY
Qty: 30 CAPSULE | Refills: 5 | Status: SHIPPED | OUTPATIENT
Start: 2021-10-12 | End: 2022-04-27

## 2021-10-18 ENCOUNTER — TELEPHONE (OUTPATIENT)
Dept: PULMONOLOGY | Age: 66
End: 2021-10-18

## 2021-10-18 NOTE — TELEPHONE ENCOUNTER
Patient cancelled appointment on 10/22/21 with Dr Mayda Arauz for CT and PFT fu .    Reason: no feeling well    Patient did reschedule appointment. Appointment rescheduled for 1/19/22. Patient will call and r/s testing.      Assessment:4/13/21  · Moderate COPD on last PFT in 2011      Not addressed today   · Peripheral vascular disease s/p bypass surgery  · Obesity  · Lower lobe bronchiectasis on abdominal CT   · Cirrhosis  · Tobacco abuse, 1-3 ppd X 42 years, average about 2 ppd for 80 pack year hx, now 1/2 ppd     Plan:   · Incruse daily, did not tolerate Anoro, try adding flovent 110 BID today   · PRN albuterol  · LDCT for LCS in October 2022  · PFT in October 2022  · COVID vaccine recommended

## 2021-10-20 ENCOUNTER — HOSPITAL ENCOUNTER (OUTPATIENT)
Dept: PULMONOLOGY | Age: 66
Discharge: HOME | End: 2021-10-20
Payer: MEDICARE

## 2021-10-26 ENCOUNTER — HOSPITAL ENCOUNTER (OUTPATIENT)
Age: 66
Discharge: HOME | End: 2021-10-26
Payer: MEDICARE

## 2021-10-26 LAB — SARS-COV-2, PCR: NOT DETECTED

## 2021-10-26 PROCEDURE — U0003 INFECTIOUS AGENT DETECTION BY NUCLEIC ACID (DNA OR RNA); SEVERE ACUTE RESPIRATORY SYNDROME CORONAVIRUS 2 (SARS-COV-2) (CORONAVIRUS DISEASE [COVID-19]), AMPLIFIED PROBE TECHNIQUE, MAKING USE OF HIGH THROUGHPUT TECHNOLOGIES AS DESCRIBED BY CMS-2020-01-R: HCPCS

## 2021-10-26 PROCEDURE — U0005 INFEC AGEN DETEC AMPLI PROBE: HCPCS

## 2021-11-01 ENCOUNTER — HOSPITAL ENCOUNTER (OUTPATIENT)
Dept: CT IMAGING | Age: 66
Discharge: HOME | End: 2021-11-01
Payer: MEDICARE

## 2021-11-01 ENCOUNTER — HOSPITAL ENCOUNTER (OUTPATIENT)
Dept: PULMONOLOGY | Age: 66
Discharge: HOME | End: 2021-11-01
Payer: MEDICARE

## 2021-11-01 VITALS — OXYGEN SATURATION: 95 %

## 2021-11-01 DIAGNOSIS — J44.9 CHRONIC OBSTRUCTIVE PULMONARY DISEASE, UNSPECIFIED COPD TYPE (HCC): ICD-10-CM

## 2021-11-01 DIAGNOSIS — Z87.891 HISTORY OF TOBACCO USE: ICD-10-CM

## 2021-11-01 LAB
DLCO %PRED: 43 %
DLCO PRED: NORMAL
DLCO/VA %PRED: NORMAL
DLCO/VA PRED: NORMAL
DLCO/VA: NORMAL
DLCO: NORMAL
EXPIRATORY TIME-POST: NORMAL
EXPIRATORY TIME: NORMAL
FEF 25-75% %CHNG: NORMAL
FEF 25-75% %PRED-POST: NORMAL
FEF 25-75% %PRED-PRE: NORMAL
FEF 25-75% PRED: NORMAL
FEF 25-75%-POST: NORMAL
FEF 25-75%-PRE: NORMAL
FEV1 %PRED-POST: 49 %
FEV1 %PRED-PRE: 46 %
FEV1 PRED: NORMAL
FEV1-POST: NORMAL
FEV1-PRE: NORMAL
FEV1/FVC %PRED-POST: NORMAL
FEV1/FVC %PRED-PRE: NORMAL
FEV1/FVC PRED: NORMAL
FEV1/FVC-POST: 58 %
FEV1/FVC-PRE: 63 %
FVC %PRED-POST: NORMAL
FVC %PRED-PRE: NORMAL
FVC PRED: NORMAL
FVC-POST: NORMAL
FVC-PRE: NORMAL
GAW %PRED: NORMAL
GAW PRED: NORMAL
GAW: NORMAL
IC %PRED: NORMAL
IC PRED: NORMAL
IC: NORMAL
MEP: NORMAL
MIP: NORMAL
MVV %PRED-PRE: NORMAL
MVV PRED: NORMAL
MVV-PRE: NORMAL
PEF %PRED-POST: NORMAL
PEF %PRED-PRE: NORMAL
PEF PRED: NORMAL
PEF%CHNG: NORMAL
PEF-POST: NORMAL
PEF-PRE: NORMAL
RAW %PRED: NORMAL
RAW PRED: NORMAL
RAW: NORMAL
RV %PRED: NORMAL
RV PRED: NORMAL
RV: NORMAL
SVC %PRED: NORMAL
SVC PRED: NORMAL
SVC: NORMAL
TLC %PRED: 92 %
TLC PRED: NORMAL
TLC: NORMAL
VA %PRED: NORMAL
VA PRED: NORMAL
VA: NORMAL
VTG %PRED: NORMAL
VTG PRED: NORMAL
VTG: NORMAL

## 2021-11-01 PROCEDURE — 71271 CT THORAX LUNG CANCER SCR C-: CPT

## 2021-11-01 PROCEDURE — 6370000000 HC RX 637 (ALT 250 FOR IP): Performed by: INTERNAL MEDICINE

## 2021-11-01 PROCEDURE — 94640 AIRWAY INHALATION TREATMENT: CPT

## 2021-11-01 PROCEDURE — 94729 DIFFUSING CAPACITY: CPT

## 2021-11-01 PROCEDURE — 94060 EVALUATION OF WHEEZING: CPT

## 2021-11-01 PROCEDURE — 94760 N-INVAS EAR/PLS OXIMETRY 1: CPT

## 2021-11-01 PROCEDURE — 94726 PLETHYSMOGRAPHY LUNG VOLUMES: CPT

## 2021-11-01 RX ORDER — ALBUTEROL SULFATE 90 UG/1
4 AEROSOL, METERED RESPIRATORY (INHALATION) ONCE
Status: COMPLETED | OUTPATIENT
Start: 2021-11-01 | End: 2021-11-01

## 2021-11-01 RX ADMIN — Medication 4 PUFF: at 14:05

## 2021-11-01 ASSESSMENT — PULMONARY FUNCTION TESTS
FEV1_PERCENT_PREDICTED_PRE: 46
FEV1_PERCENT_PREDICTED_POST: 49
FEV1/FVC_PRE: 63
FEV1/FVC_POST: 58

## 2021-11-02 NOTE — RESULT ENCOUNTER NOTE
Tell pt there are no new concerning findings on his CT imaging. We can discuss specifics when he sees me.

## 2021-11-03 NOTE — PROCEDURES
[address]                 [address] [address]                               PULMONARY FUNCTION    PATIENT NAME: Leanne Wang                    :        1955  MED REC NO:   [id number]                          ROOM:  ACCOUNT NO:   [de-identified]                           ADMIT DATE: 2021  PROVIDER:     Madelaine Ochoa MD    DATE OF PROCEDURE:  2021    ATTENDING PROVIDER:  Xuan Ly MD    INDICATION:  COPD. FINDINGS:  1. Spirometry: The FVC is 55% of predicted. The FEV1 is 1.42 liters,  which is 46% of predicted. The FEV1/FVC ratio is 0.63. There is a  positive response to bronchodilators in the FVC. 2.  Plethysmography:  The total lung capacity is 92% of predicted. The  residual volume is elevated. 3.  The diffusion capacity of carbon monoxide is 43% of predicted. 4.  The flow volume loop shows an obstructive pattern. IMPRESSION:  This patient has severe COPD with air trapping and  decreased DLCO.         Casandra Gutiérrez MD    D: 2021 16:00:39       T: 2021 22:37:33     [id number]  Job#: [id number]     Doc#: [id number]    CC:

## 2021-11-04 ENCOUNTER — OFFICE VISIT (OUTPATIENT)
Dept: PULMONOLOGY | Age: 66
End: 2021-11-04
Payer: MEDICARE

## 2021-11-04 VITALS
HEIGHT: 67 IN | SYSTOLIC BLOOD PRESSURE: 140 MMHG | OXYGEN SATURATION: 95 % | HEART RATE: 94 BPM | BODY MASS INDEX: 34.62 KG/M2 | DIASTOLIC BLOOD PRESSURE: 72 MMHG | RESPIRATION RATE: 20 BRPM | WEIGHT: 220.6 LBS

## 2021-11-04 DIAGNOSIS — J44.9 CHRONIC OBSTRUCTIVE PULMONARY DISEASE, UNSPECIFIED COPD TYPE (HCC): Primary | ICD-10-CM

## 2021-11-04 DIAGNOSIS — Z87.891 PERSONAL HISTORY OF TOBACCO USE: ICD-10-CM

## 2021-11-04 PROCEDURE — G8926 SPIRO NO PERF OR DOC: HCPCS | Performed by: INTERNAL MEDICINE

## 2021-11-04 PROCEDURE — 99213 OFFICE O/P EST LOW 20 MIN: CPT | Performed by: INTERNAL MEDICINE

## 2021-11-04 PROCEDURE — 3017F COLORECTAL CA SCREEN DOC REV: CPT | Performed by: INTERNAL MEDICINE

## 2021-11-04 PROCEDURE — G8417 CALC BMI ABV UP PARAM F/U: HCPCS | Performed by: INTERNAL MEDICINE

## 2021-11-04 PROCEDURE — G8427 DOCREV CUR MEDS BY ELIG CLIN: HCPCS | Performed by: INTERNAL MEDICINE

## 2021-11-04 PROCEDURE — 3023F SPIROM DOC REV: CPT | Performed by: INTERNAL MEDICINE

## 2021-11-04 PROCEDURE — 4004F PT TOBACCO SCREEN RCVD TLK: CPT | Performed by: INTERNAL MEDICINE

## 2021-11-04 PROCEDURE — 1123F ACP DISCUSS/DSCN MKR DOCD: CPT | Performed by: INTERNAL MEDICINE

## 2021-11-04 PROCEDURE — G8484 FLU IMMUNIZE NO ADMIN: HCPCS | Performed by: INTERNAL MEDICINE

## 2021-11-04 PROCEDURE — 4040F PNEUMOC VAC/ADMIN/RCVD: CPT | Performed by: INTERNAL MEDICINE

## 2021-11-04 NOTE — PATIENT INSTRUCTIONS
Goal: quit smoking . .. you CAN do it! Please consider getting the COVID vaccination. What is lung cancer screening? Lung cancer screening is a way in which doctors check the lungs for early signs of cancer in people who have no symptoms of lung cancer. A low-dose CT scan uses much less radiation than a normal CT scan and shows a more detailed image of the lungs than a standard X-ray. The goal of lung cancer screening is to find cancer early, before it has a chance to grow, spread, or cause problems. One large study found that smokers who were screened with low-dose CT scans were less likely to die of lung cancer than those who were screened with standard X-ray. Below is a summary of the things you need to know regarding screening for lung cancer with low-dose computed tomography (LDCT). This is a screening program that involves routine annual screening with LDCT studies of the lung. The LDCTs are done using low-dose radiation that is not thought to increase your cancer risk. If you have other serious medical conditions (other cancers, congestive heart failure) that limit your life expectancy to less than 10 years, you should not undergo lung cancer screening with LDCT. The chance is 20%-60% that the LDCT result will show abnormalities. This would require additional testing which could include repeat imaging or even invasive procedures. Most (about 95%) of \"abnormal\" LDCT results are false in the sense that no lung cancer is ultimately found. Additionally, some (about 10%) of the cancers found would not affect your life expectancy, even if undetected and untreated. If you are still smoking, the single most important thing that you can do to reduce your risk of dying of lung cancer is to quit. For this screening to be covered by Medicare and most other insurers, strict criteria must be met.   If you do not meet these criteria, but still wish to undergo LDCT testing, you will be required to sign a waiver indicating your willingness to pay for the scan.

## 2021-11-04 NOTE — PROGRESS NOTES
Low Dose CT (LDCT) Lung Screening criteria met:     Age 55-77(Medicare) or 50-80 ([facility])   Pack year smoking >30 (Medicare) or >20 ([facility])   Still smoking or less than 15 year since quit   No sign or symptoms of lung cancer   > 11 months since last LDCT     Risks and benefits of lung cancer screening with LDCT scans discussed:    Significance of positive screen - False-positive LDCT results often occur. 95% of all positive results do not lead to a diagnosis of cancer. Usually further imaging can resolve most false-positive results; however, some patients may require invasive procedures. Over diagnosis risk - 10% to 12% of screen-detected lung cancer cases are over diagnosedthat is, the cancer would not have been detected in the patient's lifetime without the screening. Need for follow up screens annually to continue lung cancer screening effectiveness     Risks associated with radiation from annual LDCT- Radiation exposure is about the same as for a mammogram, which is about 1/3 of the annual background radiation exposure from everyday life. Starting screening at age 54 is not likely to increase cancer risk from radiation exposure. Patients with comorbidities resulting in life expectancy of < 10 years, or that would preclude treatment of an abnormality identified on CT, should not be screened due to lack of benefit.     To obtain maximal benefit from this screening, smoking cessation and long-term abstinence from smoking is critical

## 2021-11-04 NOTE — PROGRESS NOTES
Chief Complaint/Referring Provider:  Patient is being seen at the request of Marvin Chavez for a consultation for COPD    Interval History:     Quit smoking x4 months when Cj Rangel passed away; niece moved in & smokes - restarted. Shortness of breath      Presenting HPI: This is a 66 yo WM seen by me 7 years ago for hilar lymphadenopathy, had EBUS which was reassuring, now here with with a four-week history of increased severe dyspnea worse with exertion associated with significant cough which is minimally productive, admitted to [facility] on 1/9/18 and treated for an acute exacerbation of COPD. He had minimal improvement in the hospital.  However he saw his primary care provider and was prescribed steroids and antibiotic and did notice some improvement although he tells me he is now somewhat worse again and he is definitely nowhere near his baseline of last December. His baseline and he does have some shortness of breath although it's only with significant exertion and was not troubling to him in the past.       reports that he has been smoking cigarettes and cigars. He started smoking about 3 years ago. He has a 84.00 pack-year smoking history. He has quit using smokeless tobacco.  His smokeless tobacco use included snuff. On average > 1-2 ppd    family history includes Cancer in his brother and mother; Diabetes in his sister; Emphysema in his father; Hypertension in his sister. Physical Exam:  Blood pressure (!) 140/72, pulse 94, resp. rate 20, height 5' 7\" (1.702 m), weight 220 lb 9.6 oz (100.1 kg), SpO2 95 %.'  Constitutional:  No acute distress. HENT:  Oropharynx is clear and moist.   Neck: No tracheal deviation present. Cardiovascular: Normal heart sounds. No lower extremity edema. Pulmonary/Chest: Few wheezes. No rhonchi. No rales. + decreased breath sounds. No accessory muscle usage or stridor. Musculoskeletal: No cyanosis. No clubbing. Skin: Skin is warm and dry. Psychiatric: Normal mood and affect. Neurologic: speech fluent, alert and oriented, strength symmetric        Data  LDCT 11/1/21  Mediastinum: Coronary artery calcifications are a marker of atherosclerosis. There are no enlarged thoracic lymph nodes.  Calcified granulomatous disease   is noted.       Lungs/pleura: The tracheobronchial tree is patent.  No change in the mild   left lower lobe bronchiectasis with associated mild bronchial wall   thickening.  There is no pneumothorax or pleural effusion.  Mild emphysema   involves the bilateral upper lungs.  There is bibasilar scarring.       No change in the 2 mm solid bilateral upper lobe pulmonary nodules.       Upper Abdomen: The liver is diffusely low in attenuation consistent with   hepatic steatosis.       Soft Tissues/Bones: Degenerative changes involve the thoracic spine.  No   change in the moderate to severe bilateral gynecomastia.  No change in the 4   mm radiopaque foreign body within the subcutaneous tissues of the anterior   left chest wall.           Impression   1. Unchanged solid subcentimeter bilateral pulmonary nodules. L Hilar TBNA 2-8-11 - no malignant cells; FLOW normal polyclonal B/T cells    PFT 2-10-11 FEV1 2L 62% % DLCO 100%  PFT Oct 2021 FEV1 1.42 L 46%  DLCO 43%    Assessment:  · Severe COPD      Not addressed today   · Peripheral vascular disease s/p bypass surgery  · Obesity  · Cirrhosis  · Tobacco abuse, 1-3 ppd X 42 years, average about 2 ppd for 80 pack year hx, now 1/2 ppd    Plan:   · Incruse daily, did not tolerate Anoro, try adding flovent 110 BID today   · PRN albuterol  · LDCT for LCS in October 2022, see me after  · COVID vaccine previously recommended    Taina's spouse     Screening CT scan was considered in a lung cancer screening counseling and shared decision making visit today that included the following elements:    Eligibility: Age: 73. There are no signs or symptoms of lung cancer.   Tobacco History 82 pack-years, quit zero years ago   Verbal counseling has been performed by me to include benefits and harms of screening, follow-up diagnostic testing, over-diagnosis, false positive rate, and total radiation exposure;    I have counseled on the importance of adherence to annual lung cancer LDCT screening, the impact of comorbidities and patient is willing to undergo diagnosis and treatment;    I have provided counseling on the importance of maintaining cigarette smoking abstinence if former smoker; or the importance of smoking cessation if current smoker and, if appropriate, furnishing of information about tobacco cessation interventions; and    I have furnished a written order for lung cancer screening with LDCT.       Order for Screening chest CT scan should be placed with documentation as below:   Beneficiary date of birth;    Actual pack - year smoking history (number) from above;    Current smoking status, and for former smokers, the number of years since quitting smoking from above  [facility] is asymptomatic   Benjamin's Desk (NPI) for [employer] [phone]

## 2021-11-23 RX ORDER — LANOLIN ALCOHOL/MO/W.PET/CERES
CREAM (GRAM) TOPICAL
Qty: 100 TABLET | Refills: 0 | Status: SHIPPED | OUTPATIENT
Start: 2021-11-23 | End: 2022-02-24

## 2021-12-06 ENCOUNTER — TELEPHONE (OUTPATIENT)
Dept: FAMILY MEDICINE CLINIC | Age: 66
End: 2021-12-06

## 2021-12-06 NOTE — TELEPHONE ENCOUNTER
Cherish with [facility] [employer] called stating that pt had an insurance change in October. Pt now has Medicare Primary, and starting in January 2022 he will have [facility]. Due to this change pt will need to have a F2F and new order placed for overnight pulse-ox at room air. States that the Dx for hypoxemia or nocturnal hypoxemia are not accepted by Medicare. States that not will need to states that needs oyxgen at night and that due to insurance change needs to be requalified. Pt has OV tomorrow, okay to make it F2F as well? Call back Bobbi with questions [phone](PZLI to LM on secure voicemail).  Fax new order and F2F to [phone]

## 2021-12-27 RX ORDER — INSULIN GLARGINE 100 [IU]/ML
INJECTION, SOLUTION SUBCUTANEOUS
Qty: 15 ML | Refills: 0 | Status: SHIPPED | OUTPATIENT
Start: 2021-12-27 | End: 2022-04-06

## 2022-01-10 ENCOUNTER — OFFICE VISIT (OUTPATIENT)
Dept: FAMILY MEDICINE CLINIC | Age: 67
End: 2022-01-10
Payer: MEDICARE

## 2022-01-10 VITALS
OXYGEN SATURATION: 97 % | SYSTOLIC BLOOD PRESSURE: 160 MMHG | WEIGHT: 226 LBS | BODY MASS INDEX: 35.47 KG/M2 | HEIGHT: 67 IN | DIASTOLIC BLOOD PRESSURE: 73 MMHG | HEART RATE: 96 BPM

## 2022-01-10 DIAGNOSIS — K74.60 LIVER CIRRHOSIS SECONDARY TO NASH (HCC): ICD-10-CM

## 2022-01-10 DIAGNOSIS — K75.81 LIVER CIRRHOSIS SECONDARY TO NASH (HCC): ICD-10-CM

## 2022-01-10 DIAGNOSIS — I85.10 SECONDARY ESOPHAGEAL VARICES WITHOUT BLEEDING (HCC): ICD-10-CM

## 2022-01-10 DIAGNOSIS — Z72.0 TOBACCO ABUSE: ICD-10-CM

## 2022-01-10 DIAGNOSIS — D50.9 IRON DEFICIENCY ANEMIA, UNSPECIFIED IRON DEFICIENCY ANEMIA TYPE: ICD-10-CM

## 2022-01-10 DIAGNOSIS — J41.8 MIXED SIMPLE AND MUCOPURULENT CHRONIC BRONCHITIS (HCC): Primary | ICD-10-CM

## 2022-01-10 DIAGNOSIS — I74.09 OTHER ARTERIAL EMBOLISM AND THROMBOSIS OF ABDOMINAL AORTA (HCC): ICD-10-CM

## 2022-01-10 DIAGNOSIS — J44.9 CHRONIC OBSTRUCTIVE PULMONARY DISEASE, UNSPECIFIED COPD TYPE (HCC): ICD-10-CM

## 2022-01-10 DIAGNOSIS — C67.9 MALIGNANT NEOPLASM OF URINARY BLADDER, UNSPECIFIED SITE (HCC): ICD-10-CM

## 2022-01-10 PROCEDURE — 3017F COLORECTAL CA SCREEN DOC REV: CPT | Performed by: FAMILY MEDICINE

## 2022-01-10 PROCEDURE — G8427 DOCREV CUR MEDS BY ELIG CLIN: HCPCS | Performed by: FAMILY MEDICINE

## 2022-01-10 PROCEDURE — 4040F PNEUMOC VAC/ADMIN/RCVD: CPT | Performed by: FAMILY MEDICINE

## 2022-01-10 PROCEDURE — 3046F HEMOGLOBIN A1C LEVEL >9.0%: CPT | Performed by: FAMILY MEDICINE

## 2022-01-10 PROCEDURE — 2022F DILAT RTA XM EVC RTNOPTHY: CPT | Performed by: FAMILY MEDICINE

## 2022-01-10 PROCEDURE — 1123F ACP DISCUSS/DSCN MKR DOCD: CPT | Performed by: FAMILY MEDICINE

## 2022-01-10 PROCEDURE — 3023F SPIROM DOC REV: CPT | Performed by: FAMILY MEDICINE

## 2022-01-10 PROCEDURE — G8484 FLU IMMUNIZE NO ADMIN: HCPCS | Performed by: FAMILY MEDICINE

## 2022-01-10 PROCEDURE — 4004F PT TOBACCO SCREEN RCVD TLK: CPT | Performed by: FAMILY MEDICINE

## 2022-01-10 PROCEDURE — 99214 OFFICE O/P EST MOD 30 MIN: CPT | Performed by: FAMILY MEDICINE

## 2022-01-10 PROCEDURE — G8417 CALC BMI ABV UP PARAM F/U: HCPCS | Performed by: FAMILY MEDICINE

## 2022-01-10 NOTE — PROGRESS NOTES
Chief Complaint   Patient presents with    Hyperlipidemia    Hypertension     Patient has not been monitoring his BP. Denies any chest pains or edema    Diabetes     Surgaar been running 120-125    Other     COPD-Having a little harder time later    Other     Evaluation for continue receving O2        Internal Administration   First Dose      Second Dose           Last COVID Lab SARS-CoV-2, PCR (no units)   Date Value   10/26/2021 Not Detected     SARS-CoV-2, ZAHIDA (no units)   Date Value   2020 NOT DETECTED             Wt Readings from Last 3 Encounters:   21 220 lb 9.6 oz (100.1 kg)   08/10/21 219 lb (99.3 kg)   21 222 lb (100.7 kg)     BP Readings from Last 3 Encounters:   21 (!) 140/72   08/10/21 (!) 144/70   21 124/60      Lab Results   Component Value Date    LABA1C 6.0 08/10/2021    LABA1C 6.4 2021    LABA1C 6.6 2020       HPI:  Ivett Nesbitt is a 73 y.o. (: 1955) here today for    Oxygen orders updated due to change in insurance. States he needs the oxygen more so at night but oxygen through out the day would be beneficial as well. Patient does not monitor his BP at home. Denies any chest pains or edema. Patient does monitor his BS at home with average readings staying between 120 and 125. COPD seems to be worsening. States he established with pain management who suggested a steroid injection but his insurance would not cover the injection until PT was completed. Patient states he is unable to do the PT due to the chronic pain he is in. He has had steroid injections in the past that were very beneficial and gave him pain relief. [] Patient has completed an advance directive  [x] Patient has NOT completed an advanced directive  [] Patient has a documented healthcare surrogate  [x] Patient does NOT have a documented healthcare surrogate  [] Discussed the importance of establishing and updating an advanced directive.   Patient has questions at this time and those were answered. [x] Discussed the importance of establishing and updating an advanced directive. Patient does NOT have questions at this time. Discussed with: [x] Patient            [] Family             [] Other caregiver    Patient's medications, allergies, past medical, surgical, social and family histories were reviewed and updated asappropriate on 1/10/2022 at 2:33 PM.    ROS:  Review of Systems    All other systems reviewed and are negative except as noted above on 1/10/2022 at 2:33 PM. Additional review of systems may be scanned into the media section ofthis medical record. Any responses requiring further intervention were pursued.     Past Medical History:   Diagnosis Date    Bronchitis chronic     Chest pain     Chronic cough     Cirrhosis of liver (HCC) 01/01/2017    stage 4     COPD (chronic obstructive pulmonary disease) (HCC)     COPD (chronic obstructive pulmonary disease) (HCC)     Diabetes mellitus (HCC)     Gout     Hilar adenopathy     Hyperlipidemia     Hypertension     Knee pain, right     Numbness and tingling of leg     Osteoarthritis     Paresthesia of bilateral legs     PVD (peripheral vascular disease) (HCC)     Seizures (HCC)     ongoing, began after swine flu vaccination    Substance abuse ([facility] [address]      Family History   Problem Relation Age of Onset    Cancer Mother         Lung    Emphysema Father     Diabetes Sister     Hypertension Sister     Cancer Brother         throat    Asthma Neg Hx      Social History     Socioeconomic History    Marital status:      Spouse name: Not on file    Number of children: Not on file    Years of education: Not on file    Highest education level: Not on file   Occupational History    Occupation: [employer]     Comment: not working currently   Tobacco Use    Smoking status: Current Every Day Smoker     Packs/day: 2.00     Years: 42.00     Pack years: 84.00     Types: Cigarettes, Cigars Start date: 9/4/2018    Smokeless tobacco: Former User     Types: Snuff    Tobacco comment: per Dr. Jason Vasquez 4/13/2021, 1-3 ppd X 42 yrs, average 2 ppd, currently 0.5 ppd   Vaping Use    Vaping Use: Former    Substances: Always   Substance and Sexual Activity    Alcohol use: Not Currently     Alcohol/week: 2.0 standard drinks     Types: 2 Cans of beer per week    Drug use: No    Sexual activity: Yes     Partners: Female   Other Topics Concern    Not on file   Social History Narrative    Not on file     Social Determinants of Health     Financial Resource Strain: Low Risk     Difficulty of Paying Living Expenses: Not hard at all   Food Insecurity: No Food Insecurity    Worried About [address] in the Last Year: Never true    [address]  [employer] in the Last Year: Never true   Transportation Needs:     Lack of Transportation (Medical): Not on file    Lack of Transportation (Non-Medical): Not on file   Physical Activity:     Days of Exercise per Week: Not on file    Minutes of Exercise per Session: Not on file   Stress:     Feeling of Stress : Not on file   Social Connections:     Frequency of Communication with Friends and Family: Not on file    Frequency of Social Gatherings with Friends and Family: Not on file    Attends [employer] Services: Not on file    Active Member of [address] [place] [address] [employer] or Organizations: Not on file    Attends Club or Organization Meetings: Not on file    Marital Status: Not on file   Intimate Partner Violence:     Fear of Current or Ex-Partner: Not on file    Emotionally Abused: Not on file    Physically Abused: Not on file    Sexually Abused: Not on file   Housing Stability:     Unable to Pay for Housing in the Last Year: Not on file    Number of [place] in the Last Year: Not on file    Unstable Housing in the Last Year: Not on file     Prior to Visit Medications    Medication Sig Taking?  Authorizing Provider   metoprolol succinate (TOPROL XL) 50 MG extended release tablet TAKE ONE TABLET BY MOUTH EVERY DAY Yes Micheal Mckeon MD   LANTUS SOLOSTAR 100 UNIT/ML injection pen INJECT 25 UNITS INTO THE SKIN DAILY WITH SUPPER Yes NAYELY Chi CNP   thiamine 100 MG tablet TAKE 1 TABLET BY MOUTH DAILY Yes Micheal Mckeon MD   atorvastatin (LIPITOR) 40 MG tablet TAKE 1 TABLET BY MOUTH DAILY Yes Micheal Mckeon MD   albuterol sulfate  (90 Base) MCG/ACT inhaler INHALE 2 PUFFS EVERY 6 HOURS AS NEEDED FOR WHEEZING Yes Micheal Mckeon MD   allopurinol (ZYLOPRIM) 300 MG tablet TAKE 1 TABLET BY MOUTH DAILY Yes NAYELY Chi CNP   pantoprazole (PROTONIX) 40 MG tablet TAKE ONE TABLET BY MOUTH EVERY MORNING BEFORE BREAKFAST Yes NAYELY Chi CNP   dilTIAZem (TIAZAC) 360 MG extended release capsule Take 1 capsule by mouth daily Yes Micheal Mckeon MD   Umeclidinium Bromide (INCRUSE ELLIPTA) 62.5 MCG/INH AEPB Inhale 1 puff into the lungs daily Yes Micheal Mckeon MD   PENTIPS 31G X 8 MM MISC USE ONCE DAILY WITH INJECTION Yes Micheal Mckeon MD   TRUE METRIX BLOOD GLUCOSE TEST strip USE 1 STRIP IN VITRO ROUTE 2 TIMES DAILY Yes Micheal Mckeon MD   DULoxetine (CYMBALTA) 30 MG extended release capsule Take one tablet by mouth for 10 days and then begin your other script for 60mg of cymbalta Yes Micheal Mckeon MD   DULoxetine (CYMBALTA) 60 MG extended release capsule Take 1 capsule by mouth daily Yes Micheal Mckeon MD   folic acid (FOLVITE) 1 MG tablet TAKE 1 TABLET BY MOUTH DAILY Yes Micheal Mckeon MD   furosemide (LASIX) 20 MG tablet TAKE 1 TABLET BY MOUTH DAILY Yes Micheal Mckeon MD   diclofenac sodium (VOLTAREN) 1 % GEL Apply 4mg of the gel three times daily to the affected knee Yes Micheal Mckeon MD   fluticasone (FLOVENT HFA) 110 MCG/ACT inhaler Inhale 1 puff into the lungs 2 times daily Yes Paz Fuentes MD ondansetron (ZOFRAN) 4 MG tablet Take 1 tablet by mouth every 6 hours as needed for Nausea or Vomiting Yes NAYELY Ortiz CNP   Alcohol Swabs (ALCOHOL PREP) 70 % PADS USE AS DIRECTED Yes MD Taya Franklinyle Lancets [address] TEST 2 TIMES DAILY Yes Alvaro Dias MD   oxyCODONE (ROXICODONE) 5 MG immediate release tablet Take 5 mg by mouth every 4 hours as needed for Pain. Yes Historical Provider, MD   naloxone [facility]) 4 MG/0.1ML LIQD nasal spray 1 spray by Nasal route as needed for Opioid Reversal May repeat after 3 minutes if no or minimal response as directed Yes Historical Provider, MD   gabapentin (NEURONTIN) 300 MG capsule Take 900 mg by mouth 3 times daily. Yes Historical Provider, MD   Blood Pressure Monitoring (BLOOD PRESSURE CUFF) MISC Please dispense insurance preference Yes Alvaro Dias MD   Blood Glucose Monitoring Suppl (FREESTYLE FREEDOM LITE) w/Device KIT 1 kit by Does not apply route daily Yes NAYELY Ortiz CNP   OXYGEN Inhale 2 L/min into the lungs nightly as needed (and daily prn)  Yes Historical Provider, MD     Allergies   Allergen Reactions    Lisinopril Swelling    Ace Inhibitors Swelling    Influenza Vaccines      He states he was hospitalized when he received his first flu vaccine    Spiriva Handihaler [Tiotropium Bromide Monohydrate] Swelling     Tolerates both tudorza and incruse    Vilanterol        OBJECTIVE:  Estimated body mass index is 34.55 kg/m² as calculated from the following:    Height as of 11/4/21: 5' 7\" (1.702 m). Weight as of 11/4/21: 220 lb 9.6 oz (100.1 kg). There were no vitals filed for this visit. Physical Exam  Vitals and nursing note reviewed. Constitutional:       General: He is not in acute distress. Appearance: He is well-developed. He is not diaphoretic. HENT:      Head: Normocephalic and atraumatic.       Right Ear: External ear normal.      Left Ear: External ear normal. Nose: Nose normal.   Eyes:      General: Lids are normal. No scleral icterus. Right eye: No discharge. Left eye: No discharge. Pupils: Pupils are equal, round, and reactive to light. Neck:      Thyroid: No thyromegaly. Vascular: No JVD. Cardiovascular:      Rate and Rhythm: Normal rate and regular rhythm. Pulmonary:      Effort: Pulmonary effort is normal. No respiratory distress. Breath sounds: Wheezing (throughout) present. Abdominal:      Palpations: Abdomen is soft. Tenderness: There is no abdominal tenderness. Skin:     General: Skin is warm and dry. Coloration: Skin is not pale. Findings: No erythema or rash. Comments: Turgor normal   Psychiatric:         Behavior: Behavior normal.         Thought Content: Thought content normal.         Judgment: Judgment normal.              ASSESSMENT PLAN      Diagnosis Orders   1. Mixed simple and mucopurulent chronic bronchitis ([address]     2. 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 Secondary esophageal varices without bleeding ([address]     4. Chronic obstructive pulmonary disease, unspecified COPD type ([address]     5. Malignant neoplasm of urinary bladder, unspecified site ([address]     6. Insulin dependent type 2 diabetes mellitus, uncontrolled ([address]     7. Tobacco abuse     8. 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 Iron deficiency anemia, unspecified iron deficiency anemia type     Patient in to qualify for daytime O2. See details in the note. Patient states that he previously has qualified for nighttime O2. We will arrange for overnight pulse oximetry. When patient ambulated he did become visibly short of breath and heart rate jumped to near 150. That was after about a minute of walking. Has been seen by Dr. Keira Velez and diagnosed with severe COPD with greatly diminished diffusing capacity.   Blood sugar readings by glycosylated hemoglobin or home fingerstick blood sugars are acceptable and no change in diabetic treatment is necessary tobacco abuse - Pt will not quit despite understanding of risks of continued tobacco use, including cancer, heart attacks, strokes or death. Update his labs when he returns in 6 months. No vomiting of blood noted. O2 sat dropped to 91% with exertion. He was 97% on entrance to the office. Patient should call the office immediately with new or ongoing signs or symptoms or worsening, or proceed to the emergency room. No changes in past medical history, past surgical history, social history, or family history were noted during the patient encounter unless specifically listed above. All updates of past medical history, past surgical history, social history, or family history were reviewed personally by me during the office visit. All problems listed in the assessment are stable unless noted otherwise. Medication profile reviewed personally by me during the visit. Medication side effects and possible impairments from medications were discussed as applicable. This document was prepared by a combination of typing and transcription through a voice recognition software. Scribe attestation: I,Angelita Gallardo, am scribing for and in the presence of Alfred Duff MD. Electronically signed by David Montemayor on 1/10/2022 at 2:33 PM      Provider attestation:     I, Dr. Ashleigh Carranza, personally performed the services described in this documentation, as scribed by the above signed scribe in my presence, and it is both accurate and complete. I agree with the ROS and Past Histories independently gathered by the clinical support staff and the remaining scribed note accurately describes my personal service to the patient.       1/10/2022    3:14 PM

## 2022-01-10 NOTE — PATIENT INSTRUCTIONS

## 2022-02-07 DIAGNOSIS — E11.42 DIABETIC POLYNEUROPATHY ASSOCIATED WITH TYPE 2 DIABETES MELLITUS (HCC): ICD-10-CM

## 2022-02-07 DIAGNOSIS — F41.8 SITUATIONAL ANXIETY: ICD-10-CM

## 2022-02-07 DIAGNOSIS — F41.9 ANXIETY: ICD-10-CM

## 2022-02-07 RX ORDER — DULOXETIN HYDROCHLORIDE 60 MG/1
CAPSULE, DELAYED RELEASE ORAL
Qty: 30 CAPSULE | Refills: 0 | Status: SHIPPED | OUTPATIENT
Start: 2022-02-07 | End: 2022-02-24

## 2022-02-17 ENCOUNTER — TELEPHONE (OUTPATIENT)
Dept: FAMILY MEDICINE CLINIC | Age: 67
End: 2022-02-17

## 2022-02-17 NOTE — TELEPHONE ENCOUNTER
----- Message from Asuncion Blocker sent at 2/17/2022 11:51 AM EST -----  Subject: Message to Provider    QUESTIONS  Information for Provider? Bobbi Tovar is calling and   needs office notes for pt form 1/3. Please call [phone], ext. [phone]  ---------------------------------------------------------------------------  --------------  CALL BACK INFO  What is the best way for the office to contact you? OK to leave message on   voicemail  Preferred Call Back Phone Number? [phone]  ---------------------------------------------------------------------------  --------------  SCRIPT ANSWERS  Relationship to Patient? Third Party  Representative Name?  Bobbi Tovar

## 2022-02-17 NOTE — TELEPHONE ENCOUNTER
Called Bobbi and got the fax number. Printed documentation from 1/10/2022(she had the wrong date before) and faxed to [phone]. Fax confirmation attached.

## 2022-02-22 RX ORDER — FLUTICASONE FUROATE, UMECLIDINIUM BROMIDE AND VILANTEROL TRIFENATATE 100; 62.5; 25 UG/1; UG/1; UG/1
1 POWDER RESPIRATORY (INHALATION) DAILY
Qty: 1 EACH | Refills: 5 | Status: SHIPPED | OUTPATIENT
Start: 2022-02-22 | End: 2022-08-02 | Stop reason: SDUPTHER

## 2022-02-23 ENCOUNTER — TELEPHONE (OUTPATIENT)
Dept: ADMINISTRATIVE | Age: 67
End: 2022-02-23

## 2022-02-23 NOTE — TELEPHONE ENCOUNTER
Submitted PA for [employer]  [employer]ia [employer]: [id number] STATUS: \": Approved, Coverage Starts on: 1/1/2022 12:00:00 AM, Coverage Ends on: 12/31/2022 \"    If this requires a response please respond to the pool ( P [id number] [facility]). Thank you please advise patient.

## 2022-02-24 DIAGNOSIS — E11.42 DIABETIC POLYNEUROPATHY ASSOCIATED WITH TYPE 2 DIABETES MELLITUS (HCC): ICD-10-CM

## 2022-02-24 DIAGNOSIS — F41.9 ANXIETY: ICD-10-CM

## 2022-02-24 DIAGNOSIS — F41.8 SITUATIONAL ANXIETY: ICD-10-CM

## 2022-02-24 RX ORDER — DEXAMETHASONE 4 MG/1
TABLET ORAL
Qty: 12 G | Refills: 0 | Status: ON HOLD | OUTPATIENT
Start: 2022-02-24 | End: 2022-05-11 | Stop reason: HOSPADM

## 2022-02-24 RX ORDER — DULOXETIN HYDROCHLORIDE 60 MG/1
CAPSULE, DELAYED RELEASE ORAL
Qty: 30 CAPSULE | Refills: 5 | Status: SHIPPED | OUTPATIENT
Start: 2022-02-24 | End: 2022-08-02

## 2022-02-24 RX ORDER — LANOLIN ALCOHOL/MO/W.PET/CERES
CREAM (GRAM) TOPICAL
Qty: 100 TABLET | Refills: 1 | Status: SHIPPED | OUTPATIENT
Start: 2022-02-24 | End: 2022-08-23

## 2022-04-04 RX ORDER — ATORVASTATIN CALCIUM 40 MG/1
TABLET, FILM COATED ORAL
Qty: 30 TABLET | Refills: 5 | Status: SHIPPED | OUTPATIENT
Start: 2022-04-04 | End: 2022-09-12

## 2022-04-04 RX ORDER — ALLOPURINOL 300 MG/1
TABLET ORAL
Qty: 30 TABLET | Refills: 5 | Status: SHIPPED | OUTPATIENT
Start: 2022-04-04 | End: 2022-09-13

## 2022-04-06 RX ORDER — CALCIUM CITRATE/VITAMIN D3 200MG-6.25
TABLET ORAL
Qty: 50 STRIP | Refills: 0 | Status: SHIPPED | OUTPATIENT
Start: 2022-04-06

## 2022-04-06 RX ORDER — GLUCOSAM/CHON-MSM1/C/MANG/BOSW 500-416.6
TABLET ORAL
Qty: 100 EACH | Refills: 5 | Status: SHIPPED | OUTPATIENT
Start: 2022-04-06

## 2022-04-06 RX ORDER — INSULIN GLARGINE 100 [IU]/ML
INJECTION, SOLUTION SUBCUTANEOUS
Qty: 15 ML | Refills: 0 | Status: SHIPPED | OUTPATIENT
Start: 2022-04-06 | End: 2022-08-03

## 2022-04-13 RX ORDER — PANTOPRAZOLE SODIUM 40 MG/1
TABLET, DELAYED RELEASE ORAL
Qty: 30 TABLET | Refills: 5 | Status: SHIPPED | OUTPATIENT
Start: 2022-04-13

## 2022-04-25 ENCOUNTER — TELEPHONE (OUTPATIENT)
Dept: FAMILY MEDICINE CLINIC | Age: 67
End: 2022-04-25

## 2022-04-25 DIAGNOSIS — J41.8 MIXED SIMPLE AND MUCOPURULENT CHRONIC BRONCHITIS (HCC): ICD-10-CM

## 2022-04-25 RX ORDER — ALBUTEROL SULFATE 90 UG/1
AEROSOL, METERED RESPIRATORY (INHALATION)
Qty: 18 G | Refills: 0 | Status: SHIPPED | OUTPATIENT
Start: 2022-04-25

## 2022-04-25 NOTE — TELEPHONE ENCOUNTER
Pt sent to me for triage. Pt had his insurance nurse get on the phone to speak with her. States that she was there to just do a check up on pt. States that pt's O2 levels were running from 87 to 91, and at one point dropped to 85. States that pt informed her that they normally run around 95. States that his O2 levels wouldn't stay below 90. States that pt had informed her that he did pass out a couple of months ago and had a fall last week due to dizziness. Advised pt to go to ER to be evaluated since his O2 seems to be jumping around and he's having the dizziness and fall. Pt is going to try to call his brother to take him to ER otherwise will call charles.

## 2022-04-27 RX ORDER — DILTIAZEM HYDROCHLORIDE 180 MG/1
CAPSULE, EXTENDED RELEASE ORAL
Qty: 60 CAPSULE | Refills: 0 | Status: SHIPPED | OUTPATIENT
Start: 2022-04-27 | End: 2022-06-15

## 2022-05-05 ENCOUNTER — APPOINTMENT (OUTPATIENT)
Dept: GENERAL RADIOLOGY | Age: 67
DRG: 432 | End: 2022-05-05
Payer: MEDICARE

## 2022-05-05 ENCOUNTER — HOSPITAL ENCOUNTER (EMERGENCY)
Age: 67
Discharge: HOME | DRG: 432 | End: 2022-05-05
Attending: EMERGENCY MEDICINE
Payer: MEDICARE

## 2022-05-05 VITALS
HEART RATE: 80 BPM | SYSTOLIC BLOOD PRESSURE: 139 MMHG | HEIGHT: 67 IN | RESPIRATION RATE: 17 BRPM | WEIGHT: 229 LBS | DIASTOLIC BLOOD PRESSURE: 59 MMHG | TEMPERATURE: 98 F | OXYGEN SATURATION: 93 % | BODY MASS INDEX: 35.94 KG/M2

## 2022-05-05 DIAGNOSIS — K92.1 GASTROINTESTINAL HEMORRHAGE WITH MELENA: ICD-10-CM

## 2022-05-05 DIAGNOSIS — J44.1 COPD EXACERBATION (HCC): Primary | ICD-10-CM

## 2022-05-05 LAB
A/G RATIO: 0.9 (ref 1.1–2.2)
ALBUMIN SERPL-MCNC: 3.7 G/DL (ref 3.4–5)
ALP BLD-CCNC: 162 U/L (ref 40–129)
ALT SERPL-CCNC: 15 U/L (ref 10–40)
ANION GAP SERPL CALCULATED.3IONS-SCNC: 15 MMOL/L (ref 3–16)
ANISOCYTOSIS: ABNORMAL
AST SERPL-CCNC: 31 U/L (ref 15–37)
BACTERIA: ABNORMAL /HPF
BASOPHILS ABSOLUTE: 0.1 K/UL (ref 0–0.2)
BASOPHILS RELATIVE PERCENT: 0.9 %
BILIRUB SERPL-MCNC: 0.4 MG/DL (ref 0–1)
BILIRUBIN URINE: NEGATIVE
BLOOD, URINE: NEGATIVE
BUN BLDV-MCNC: 8 MG/DL (ref 7–20)
CALCIUM SERPL-MCNC: 8.5 MG/DL (ref 8.3–10.6)
CHLORIDE BLD-SCNC: 97 MMOL/L (ref 99–110)
CLARITY: ABNORMAL
CO2: 26 MMOL/L (ref 21–32)
COLOR: YELLOW
CREAT SERPL-MCNC: 1 MG/DL (ref 0.8–1.3)
CRYSTALS, UA: ABNORMAL /HPF
EKG ATRIAL RATE: 87 BPM
EKG DIAGNOSIS: NORMAL
EKG P AXIS: 59 DEGREES
EKG P-R INTERVAL: 240 MS
EKG Q-T INTERVAL: 380 MS
EKG QRS DURATION: 82 MS
EKG QTC CALCULATION (BAZETT): 457 MS
EKG R AXIS: -27 DEGREES
EKG T AXIS: 62 DEGREES
EKG VENTRICULAR RATE: 87 BPM
EOSINOPHILS ABSOLUTE: 0.3 K/UL (ref 0–0.6)
EOSINOPHILS RELATIVE PERCENT: 2.9 %
GFR AFRICAN AMERICAN: >60
GFR NON-AFRICAN AMERICAN: >60
GLUCOSE BLD-MCNC: 88 MG/DL (ref 70–99)
GLUCOSE URINE: NEGATIVE MG/DL
HCT VFR BLD CALC: 27.5 % (ref 40.5–52.5)
HEMATOLOGY PATH CONSULT: YES
HEMOGLOBIN: 8.4 G/DL (ref 13.5–17.5)
HYPOCHROMIA: ABNORMAL
KETONES, URINE: NEGATIVE MG/DL
LEUKOCYTE ESTERASE, URINE: NEGATIVE
LYMPHOCYTES ABSOLUTE: 2.3 K/UL (ref 1–5.1)
LYMPHOCYTES RELATIVE PERCENT: 23.1 %
MAGNESIUM: 1.6 MG/DL (ref 1.8–2.4)
MCH RBC QN AUTO: 21 PG (ref 26–34)
MCHC RBC AUTO-ENTMCNC: 30.5 G/DL (ref 31–36)
MCV RBC AUTO: 68.8 FL (ref 80–100)
MICROSCOPIC EXAMINATION: YES
MONOCYTES ABSOLUTE: 1.1 K/UL (ref 0–1.3)
MONOCYTES RELATIVE PERCENT: 10.7 %
NEUTROPHILS ABSOLUTE: 6.3 K/UL (ref 1.7–7.7)
NEUTROPHILS RELATIVE PERCENT: 62.4 %
NITRITE, URINE: NEGATIVE
OCCULT BLOOD DIAGNOSTIC: ABNORMAL
PDW BLD-RTO: 19.2 % (ref 12.4–15.4)
PH UA: 7.5 (ref 5–8)
PLATELET # BLD: 317 K/UL (ref 135–450)
PLATELET SLIDE REVIEW: ADEQUATE
PMV BLD AUTO: 6.8 FL (ref 5–10.5)
POTASSIUM REFLEX MAGNESIUM: 3.5 MMOL/L (ref 3.5–5.1)
PRO-BNP: 170 PG/ML (ref 0–124)
PROTEIN UA: ABNORMAL MG/DL
RAPID INFLUENZA  B AGN: NEGATIVE
RAPID INFLUENZA A AGN: NEGATIVE
RBC # BLD: 4 M/UL (ref 4.2–5.9)
RBC UA: ABNORMAL /HPF (ref 0–4)
SARS-COV-2, NAAT: NOT DETECTED
SLIDE REVIEW: ABNORMAL
SODIUM BLD-SCNC: 138 MMOL/L (ref 136–145)
SPECIFIC GRAVITY UA: 1.02 (ref 1–1.03)
TOTAL PROTEIN: 7.6 G/DL (ref 6.4–8.2)
URINE REFLEX TO CULTURE: ABNORMAL
URINE TYPE: ABNORMAL
UROBILINOGEN, URINE: 1 E.U./DL
WBC # BLD: 10.1 K/UL (ref 4–11)
WBC UA: ABNORMAL /HPF (ref 0–5)

## 2022-05-05 PROCEDURE — 99285 EMERGENCY DEPT VISIT HI MDM: CPT

## 2022-05-05 PROCEDURE — 96374 THER/PROPH/DIAG INJ IV PUSH: CPT

## 2022-05-05 PROCEDURE — 82270 OCCULT BLOOD FECES: CPT

## 2022-05-05 PROCEDURE — 71046 X-RAY EXAM CHEST 2 VIEWS: CPT

## 2022-05-05 PROCEDURE — 6360000002 HC RX W HCPCS: Performed by: EMERGENCY MEDICINE

## 2022-05-05 PROCEDURE — 6370000000 HC RX 637 (ALT 250 FOR IP): Performed by: EMERGENCY MEDICINE

## 2022-05-05 PROCEDURE — 80053 COMPREHEN METABOLIC PANEL: CPT

## 2022-05-05 PROCEDURE — 93005 ELECTROCARDIOGRAM TRACING: CPT | Performed by: EMERGENCY MEDICINE

## 2022-05-05 PROCEDURE — 85025 COMPLETE CBC W/AUTO DIFF WBC: CPT

## 2022-05-05 PROCEDURE — 36415 COLL VENOUS BLD VENIPUNCTURE: CPT

## 2022-05-05 PROCEDURE — 83880 ASSAY OF NATRIURETIC PEPTIDE: CPT

## 2022-05-05 PROCEDURE — 87804 INFLUENZA ASSAY W/OPTIC: CPT

## 2022-05-05 PROCEDURE — 83735 ASSAY OF MAGNESIUM: CPT

## 2022-05-05 PROCEDURE — 81001 URINALYSIS AUTO W/SCOPE: CPT

## 2022-05-05 PROCEDURE — 87635 SARS-COV-2 COVID-19 AMP PRB: CPT

## 2022-05-05 RX ORDER — IPRATROPIUM BROMIDE AND ALBUTEROL SULFATE 2.5; .5 MG/3ML; MG/3ML
1 SOLUTION RESPIRATORY (INHALATION) ONCE
Status: COMPLETED | OUTPATIENT
Start: 2022-05-05 | End: 2022-05-05

## 2022-05-05 RX ORDER — METHYLPREDNISOLONE SODIUM SUCCINATE 125 MG/2ML
125 INJECTION, POWDER, LYOPHILIZED, FOR SOLUTION INTRAMUSCULAR; INTRAVENOUS ONCE
Status: COMPLETED | OUTPATIENT
Start: 2022-05-05 | End: 2022-05-05

## 2022-05-05 RX ORDER — IPRATROPIUM BROMIDE AND ALBUTEROL SULFATE 2.5; .5 MG/3ML; MG/3ML
1 SOLUTION RESPIRATORY (INHALATION) EVERY 4 HOURS
Qty: 360 ML | Refills: 0 | Status: SHIPPED | OUTPATIENT
Start: 2022-05-05

## 2022-05-05 RX ADMIN — IPRATROPIUM BROMIDE AND ALBUTEROL SULFATE 1 AMPULE: .5; 3 SOLUTION RESPIRATORY (INHALATION) at 14:00

## 2022-05-05 RX ADMIN — IPRATROPIUM BROMIDE AND ALBUTEROL SULFATE 1 AMPULE: .5; 3 SOLUTION RESPIRATORY (INHALATION) at 13:59

## 2022-05-05 RX ADMIN — METHYLPREDNISOLONE SODIUM SUCCINATE 125 MG: 125 INJECTION, POWDER, FOR SOLUTION INTRAMUSCULAR; INTRAVENOUS at 14:00

## 2022-05-05 ASSESSMENT — PAIN SCALES - GENERAL: PAINLEVEL_OUTOF10: 4

## 2022-05-05 ASSESSMENT — PAIN - FUNCTIONAL ASSESSMENT: PAIN_FUNCTIONAL_ASSESSMENT: 0-10

## 2022-05-05 ASSESSMENT — PAIN DESCRIPTION - DESCRIPTORS: DESCRIPTORS: STABBING

## 2022-05-05 ASSESSMENT — PAIN DESCRIPTION - LOCATION: LOCATION: CHEST

## 2022-05-05 ASSESSMENT — PAIN DESCRIPTION - ORIENTATION: ORIENTATION: MID

## 2022-05-05 ASSESSMENT — ENCOUNTER SYMPTOMS
ABDOMINAL PAIN: 0
BACK PAIN: 0
SHORTNESS OF BREATH: 0

## 2022-05-05 NOTE — ED NOTES
Patient complaint of urinary retention since last night. Bladder scan shows 455 mL. Results reviewed with Dr. Angela Harris. Verbal order for chavira placement. Chavira placed using sterile technique, patient tolerated well.       Cherylene Africa, RN  05/05/22 0338

## 2022-05-05 NOTE — ED NOTES
AVS provided and reviewed with the patient. The patient verbalized understanding of care at home, follow up care, and emergent symptoms to return for. No questions or concerns verbalized at this time. The patient is alert, oriented, stable, and ambulatory out of the department at the time of discharge.        eFlicita Chacko RN  05/05/22 [id number]

## 2022-05-05 NOTE — ED PROVIDER NOTES
[address] [facility] Name: Christopher Guerrero  MRN: [id number]  [place] 1955  Date of evaluation: 2022  Provider: Miya Ragland MD    [address]       Chief Complaint   Patient presents with    Shortness of Breath     c/o worsening shortness of breath for approximately 2 weeks         HISTORY OF PRESENT ILLNESS   (Location/Symptom, Timing/Onset, Context/Setting, Quality, Duration, Modifying Factors, Severity)  Note limiting factors. Christopher Guerrero is a 73 y.o. male who presents to the emergency department     Patient presents emergency department history of apparently complaining of having some shortness of breath for the last 3 to 4 days he says he is coughing more but it is a white sputum  He lives by himself he said his wife recently  he is followed by Dr. Betty Marroquin [facility] pulmonology specialist he does use several inhalers is also an insulin-dependent diabetic and endorses that his blood sugar was running good this morning  He does have a history of gout he is on cardia XT according to his records he does wear 2 to 3 L of oxygen at nighttime. He has used nebulizers in the past but recently he has not used that he is not sure why he does have a history of continued smoking history he did have a history of hilar lymphadenopathy in the past  He denies any known exposure to flu or COVID-19 that he is aware of. The history is provided by the patient. The patient is not aware of having a GI bleed before I did notice that his hemoglobin is down to 8.4 however therefore I did do a rectal exam which revealed dark brown stool not true melena but dark brown and it is positive for blood  Nursing Notes were reviewed. REVIEW OF SYSTEMS    (2-9 systems for level 4, 10 or more for level 5)     Review of Systems   Constitutional: Positive for activity change. Negative for chills and fever. Respiratory: Negative for shortness of breath. Cardiovascular: Negative for chest pain. Gastrointestinal: Negative for abdominal pain. Genitourinary: Negative for decreased urine volume and urgency. Musculoskeletal: Negative for back pain, gait problem, joint swelling, myalgias and neck pain. Neurological: Negative for dizziness. Psychiatric/Behavioral: Negative for behavioral problems. All other systems reviewed and are negative. Except as noted above the remainder of the review of systems was reviewed and negative.        PAST MEDICAL HISTORY     Past Medical History:   Diagnosis Date    Bronchitis chronic     Chest pain     Chronic cough     Cirrhosis of liver ([facility] [address] 01/01/2017    stage 4     COPD (chronic obstructive pulmonary disease) (HCC)     COPD (chronic obstructive pulmonary disease) (HCC)     Diabetes mellitus (HCC)     Gout     Hilar adenopathy     Hyperlipidemia     Hypertension     Knee pain, right     Numbness and tingling of leg     Osteoarthritis     Paresthesia of bilateral legs     PVD (peripheral vascular disease) (HCC)     Seizures (HCC)     ongoing, began after swine flu vaccination    Substance abuse [facility])          SURGICAL HISTORY       Past Surgical History:   Procedure Laterality Date    AORTO-FEMORAL BYPASS GRAFT  08/2020    AORTO-ILIAC BYPASS GRAFT N/A 8/24/2020    AORTOBIFEMORAL BYPASS GRAFT performed by Renetta Hernandez MD at [address] [place] [address]  2/7/2011    bronch with EBUS    COLONOSCOPY N/A 7/23/2018    COLONOSCOPY WITH BIOPSY performed by Hue Henley MD at [address] COLONOSCOPY N/A 7/23/2018    COLONOSCOPY POLYPECTOMY SNARE/COLD BIOPSY performed by Hue Henley MD at [facility]  2/4/15    Urethral Dilatation, Resection of Bladder Tumor    CYSTOSCOPY  2/24/2016    fulgeration of bladder tumor    ELBOW SURGERY Left     ENDOSCOPY, SMALL BOWEL N/A 2/20/2019    BOWEL SMALL CAPSULE ENDOSCOPY performed by Hue Henley MD at [facility]    OTHER SURGICAL HISTORY  05-    cystoscopy, bladder biopsy    OTHER SURGICAL HISTORY  09/09/2015    cystoscopy, urethral dilatation, bladder tumor follow up   [facility] 7/23/2018    EGD BIOPSY performed by Freya Montano MD at [facility]       Previous Medications    ALBUTEROL SULFATE  (90 BASE) MCG/ACT INHALER    INHALE 2 PUFFS EVERY 6 HOURS AS NEEDED FOR WHEEZING    ALCOHOL SWABS (ALCOHOL PREP) 70 % PADS    USE AS DIRECTED    ALLOPURINOL (ZYLOPRIM) 300 MG TABLET    TAKE 1 TABLET BY MOUTH DAILY    ATORVASTATIN (LIPITOR) 40 MG TABLET    TAKE 1 TABLET BY MOUTH DAILY    BLOOD GLUCOSE MONITORING SUPPL (FREESTYLE FREEDOM LITE) W/DEVICE KIT    1 kit by Does not apply route daily    BLOOD PRESSURE MONITORING (BLOOD PRESSURE CUFF) MISC    Please dispense insurance preference    CARTIA  MG EXTENDED RELEASE CAPSULE    TAKE 2 CAPSULES ONCE DAILY    DICLOFENAC SODIUM (VOLTAREN) 1 % GEL    Apply 4mg of the gel three times daily to the affected knee    DULOXETINE (CYMBALTA) 60 MG EXTENDED RELEASE CAPSULE    TAKE ONE CAPSULE BY MOUTH EVERY DAY    FLOVENT  MCG/ACT INHALER    Inhale 1 puff into the lungs 2 times daily    FLUTICASONE-UMECLIDIN-VILANT (TRELEGY ELLIPTA) 100-62.5-25 MCG/INH AEPB    Inhale 1 puff into the lungs daily    FOLIC ACID (FOLVITE) 1 MG TABLET    TAKE 1 TABLET BY MOUTH DAILY    FUROSEMIDE (LASIX) 20 MG TABLET    TAKE 1 TABLET BY MOUTH DAILY    GABAPENTIN (NEURONTIN) 300 MG CAPSULE    Take 900 mg by mouth 3 times daily.      LANTUS SOLOSTAR 100 UNIT/ML INJECTION PEN    INJECT 25 UNITS INTO THE SKIN DAILY WITH SUPPER    METOPROLOL SUCCINATE (TOPROL XL) 50 MG EXTENDED RELEASE TABLET    TAKE ONE TABLET BY MOUTH EVERY DAY    NALOXONE (NARCAN) 4 MG/0.1ML LIQD NASAL SPRAY    1 spray by Nasal route as needed for Opioid Reversal May repeat after 3 minutes if no or minimal response as directed ONDANSETRON (ZOFRAN) 4 MG TABLET    Take 1 tablet by mouth every 6 hours as needed for Nausea or Vomiting    OXYCODONE (ROXICODONE) 5 MG IMMEDIATE RELEASE TABLET    Take 10 mg by mouth every 4 hours as needed for Pain. OXYGEN    Inhale 2 L/min into the lungs nightly as needed (and daily prn)     PANTOPRAZOLE (PROTONIX) 40 MG TABLET    TAKE ONE TABLET BY MOUTH EVERY MORNING BEFORE BREAKFAST    PENTIPS 31G X 8 MM MISC    USE ONCE DAILY WITH INJECTION    THIAMINE 100 MG TABLET    TAKE 1 TABLET BY MOUTH DAILY    TRUE METRIX BLOOD GLUCOSE TEST STRIP    USE 1 STRIP IN VITRO ROUTE 2 TIMES DAILY    TRUEPLUS LANCETS 30G MISC    TEST 2 TIMES DAILY       ALLERGIES     Lisinopril, Ace inhibitors, Influenza vaccines, Spiriva handihaler [tiotropium bromide monohydrate], and Vilanterol    FAMILY HISTORY       Family History   Problem Relation Age of Onset    Cancer Mother         Lung    Emphysema Father     Diabetes Sister     Hypertension Sister     Cancer Brother         throat    Asthma Neg Hx           SOCIAL HISTORY       Social History     Socioeconomic History    Marital status:      Spouse name: None    Number of children: None    Years of education: None    Highest education level: None   Occupational History    Occupation: [employer]     Comment: not working currently   Tobacco Use    Smoking status: Current Every Day Smoker     Packs/day: 1.00     Years: 42.00     Pack years: 42.00     Types: Cigarettes, Cigars     Start date: 9/4/2018    Smokeless tobacco: Former User     Types: Snuff   Vaping Use    Vaping Use: Former    Substances: Always   Substance and Sexual Activity    Alcohol use:  Yes     Alcohol/week: 6.0 standard drinks     Types: 6 Cans of beer per week    Drug use: No    Sexual activity: Yes     Partners: Female   Other Topics Concern    None   Social History Narrative    None     Social Determinants of Health     Financial Resource Strain: Low Risk     Difficulty of Paying Living Expenses: Not hard at all   Food Insecurity: No Food Insecurity    Worried About [facility] in the Last Year: Never true    Ran Out of Food in the Last Year: Never true   Transportation Needs:     Lack of Transportation (Medical): Not on file    Lack of Transportation (Non-Medical): Not on file   Physical Activity:     Days of Exercise per Week: Not on file    Minutes of Exercise per Session: Not on file   Stress:     Feeling of Stress : Not on file   Social Connections:     Frequency of Communication with Friends and Family: Not on file    Frequency of Social Gatherings with Friends and Family: Not on file    Attends Zoroastrian Services: Not on file    Active Member of [address] [place] [address] or Organizations: Not on file    Attends Club or Organization Meetings: Not on file    Marital Status: Not on file   Intimate Partner Violence:     Fear of Current or Ex-Partner: Not on file    Emotionally Abused: Not on file    Physically Abused: Not on file    Sexually Abused: Not on file   Housing Stability:     Unable to Pay for Housing in the Last Year: Not on file    Number of [place] in the Last Year: Not on file    Unstable Housing in the Last Year: Not on file       SCREENINGS    Francisco Coma Scale  Eye Opening: Spontaneous  Best Verbal Response: Oriented  Best Motor Response: Obeys commands  Francisco Coma Scale Score: 15          PHYSICAL EXAM    (up to 7 for level 4, 8 or more for level 5)     ED Triage Vitals [05/05/22 1251]   BP Temp Temp Source Pulse Resp SpO2 Height Weight   (!) 115/50 98 °F (36.7 °C) Oral 97 20 (!) 86 % 5' 7\" (1.702 m) 229 lb (103.9 kg)       Physical Exam  Vitals and nursing note reviewed. Constitutional:       General: He is in acute distress. Appearance: He is obese. He is ill-appearing. HENT:      Head: Normocephalic and atraumatic. Mouth/Throat:      Mouth: Mucous membranes are moist.      Pharynx: Oropharynx is clear.    Eyes:      Extraocular Movements: Extraocular movements intact. Pupils: Pupils are equal, round, and reactive to light. Cardiovascular:      Rate and Rhythm: Normal rate and regular rhythm. Pulmonary:      Effort: Tachypnea present. Breath sounds: Wheezing and rhonchi present. No rales. Chest:      Chest wall: No mass or deformity. Abdominal:      General: Bowel sounds are normal.      Palpations: Abdomen is soft. Musculoskeletal:      Cervical back: Normal range of motion. Right lower leg: No tenderness. No edema. Left lower leg: No tenderness. No edema. Neurological:      General: No focal deficit present. Mental Status: He is alert. DIAGNOSTIC RESULTS     EKG: All EKG's are interpreted by the Emergency Department Physician who either signs or Co-signs this chart in the absence of a cardiologist.  Rate is 87  Rhythm sinus  No acute ST-T wave changes  No arrhythmia  Normal sinus rhythm      RADIOLOGY:   Non-plain film images such as CT, Ultrasound and MRI are read by the radiologist. Plain radiographic images are visualized and preliminarily interpreted by the emergency physician with the below findings:        Interpretation per the Radiologist below, if available at the time of this note:    XR CHEST (2 VW)   Final Result   No radiographic evidence of acute pulmonary disease.                  LABS:  Results for orders placed or performed during the hospital encounter of 05/05/22   COVID-19, Rapid    Specimen: Nasopharyngeal Swab   Result Value Ref Range    SARS-CoV-2, NAAT Not Detected Not Detected   Rapid influenza A/B antigens    Specimen: Nasopharyngeal   Result Value Ref Range    Rapid Influenza A Ag Negative Negative    Rapid Influenza B Ag Negative Negative   Comprehensive Metabolic Panel w/ Reflex to MG   Result Value Ref Range    Sodium 138 136 - 145 mmol/L    Potassium reflex Magnesium 3.5 3.5 - 5.1 mmol/L    Chloride 97 (L) 99 - 110 mmol/L    CO2 26 21 - 32 mmol/L    Anion Gap 15 3 - 16 Glucose 88 70 - 99 mg/dL    BUN 8 7 - 20 mg/dL    CREATININE 1.0 0.8 - 1.3 mg/dL    GFR Non-African American >60 >60    GFR African American >60 >60    Calcium 8.5 8.3 - 10.6 mg/dL    Total Protein 7.6 6.4 - 8.2 g/dL    Albumin 3.7 3.4 - 5.0 g/dL    Albumin/Globulin Ratio 0.9 (L) 1.1 - 2.2    Total Bilirubin 0.4 0.0 - 1.0 mg/dL    Alkaline Phosphatase 162 (H) 40 - 129 U/L    ALT 15 10 - 40 U/L    AST 31 15 - 37 U/L   Brain Natriuretic Peptide   Result Value Ref Range    Pro- (H) 0 - 124 pg/mL   CBC with Auto Differential   Result Value Ref Range    WBC 10.1 4.0 - 11.0 K/uL    RBC 4.00 (L) 4.20 - 5.90 M/uL    Hemoglobin 8.4 (L) 13.5 - 17.5 g/dL    Hematocrit 27.5 (L) 40.5 - 52.5 %    MCV 68.8 (L) 80.0 - 100.0 fL    MCH 21.0 (L) 26.0 - 34.0 pg    MCHC 30.5 (L) 31.0 - 36.0 g/dL    RDW 19.2 (H) 12.4 - 15.4 %    Platelets 352 210 - 594 K/uL    MPV 6.8 5.0 - 10.5 fL    PLATELET SLIDE REVIEW Adequate     SLIDE REVIEW see below     Path Consult Yes     Neutrophils % 62.4 %    Lymphocytes % 23.1 %    Monocytes % 10.7 %    Eosinophils % 2.9 %    Basophils % 0.9 %    Neutrophils Absolute 6.3 1.7 - 7.7 K/uL    Lymphocytes Absolute 2.3 1.0 - 5.1 K/uL    Monocytes Absolute 1.1 0.0 - 1.3 K/uL    Eosinophils Absolute 0.3 0.0 - 0.6 K/uL    Basophils Absolute 0.1 0.0 - 0.2 K/uL    Anisocytosis 2+ (A)     Hypochromia 2+ (A)    Magnesium   Result Value Ref Range    Magnesium 1.60 (L) 1.80 - 2.40 mg/dL   Urinalysis with Reflex to Culture    Specimen: Urine, clean catch   Result Value Ref Range    Color, UA Yellow Straw/Yellow    Clarity, UA CLOUDY (A) Clear    Glucose, Ur Negative Negative mg/dL    Bilirubin Urine Negative Negative    Ketones, Urine Negative Negative mg/dL    Specific Gravity, UA 1.020 1.005 - 1.030    Blood, Urine Negative Negative    pH, UA 7.5 5.0 - 8.0    Protein, UA TRACE (A) Negative mg/dL    Urobilinogen, Urine 1.0 <2.0 E.U./dL    Nitrite, Urine Negative Negative    Leukocyte Esterase, Urine Negative Negative    Microscopic Examination YES     Urine Type NotGiven     Urine Reflex to Culture Not Indicated    Microscopic Urinalysis   Result Value Ref Range    WBC, UA 3-5 0 - 5 /HPF    RBC, UA 0-2 0 - 4 /HPF    Bacteria, UA 4+ (A) None Seen /HPF    Crystals, UA 2+ Triple Phos (A) None Seen /HPF   Blood Occult Stool Diagnostic   Result Value Ref Range    Occult Blood Diagnostic Result: POSITIVE  Normal range: Negative   (A)    EKG 12 Lead   Result Value Ref Range    Ventricular Rate 87 BPM    Atrial Rate 87 BPM    P-R Interval 240 ms    QRS Duration 82 ms    Q-T Interval 380 ms    QTc Calculation (Bazett) 457 ms    P Axis 59 degrees    R Axis -27 degrees    T Axis 62 degrees    Diagnosis       Sinus rhythm with 1st degree A-V blockOtherwise normal ECGWhen compared with ECG of 03-AUG-2020 14:37,Vent. rate has increased BY  41 BPMQT has lengthened            EMERGENCY DEPARTMENT COURSE and DIFFERENTIAL DIAGNOSIS/MDM:     Vitals:    05/05/22 1609 05/05/22 1624 05/05/22 1639 05/05/22 1654   BP: (!) 141/64 (!) 131/52 (!) 133/50 (!) 139/59   Pulse: 75 75 73 80   Resp: 14 13 15 17   Temp:       TempSrc:       SpO2: 93% 91% 93% 93%   Weight:       Height:               MDM      REASSESSMENT        Patient after receiving a DuoNeb solution nebulized treatment he is feeling much better.   The patient at this point does want to go home I did offer him admission I did do a rectal which showed dark brown guaiac positive stool we did talk about him getting into see a GI doctor Dr. Shawanda Zaldivar team is the team on call for GI today  The importance of following up with them is stressed he is advised to call first thing in tomorrow morning  Advised him to see Dr. Aguila Colmenares to get repeat blood tests early next week  He is to continue his oxygen therapy nebulized treatments and of course return if any worsening patient at this point we did put a Castrejon catheter in him for some urinary retention when he first arrived we will pull that out we are advising him to see Dr. Suze Corbett for possibly referral on that  CRITICAL CARE TIME     CONSULTS:  None      PROCEDURES:     Procedures    MEDICATIONS GIVEN THIS VISIT:  Medications   ipratropium-albuterol (DUONEB) nebulizer solution 1 ampule (1 ampule Inhalation Given 5/5/22 1400)   methylPREDNISolone sodium (SOLU-MEDROL) injection 125 mg (125 mg IntraVENous Given 5/5/22 1400)   ipratropium-albuterol (DUONEB) nebulizer solution 1 ampule (1 ampule Inhalation Given 5/5/22 1359)        FINAL IMPRESSION      1. COPD exacerbation ([facility] [address]    2. Gastrointestinal hemorrhage with melena            DISPOSITION/PLAN   DISPOSITION Decision To Discharge 05/05/2022 04:56:23 PM      PATIENT REFERRED TO:  Nichole Polanco MD  [address] [place] [address]. [facility]  [phone]    Schedule an appointment as soon as possible for a visit   Have your blood counts repeated next week,    Milla Mcdaniel MD  [place] [address]  [place] [address]  [phone]    Schedule an appointment as soon as possible for a visit in 3 days        DISCHARGE MEDICATIONS:  New Prescriptions    IPRATROPIUM-ALBUTEROL (DUONEB) 0.5-2.5 (3) MG/3ML SOLN NEBULIZER SOLUTION    Inhale 3 mLs into the lungs every 4 hours       Controlled Substances Monitoring  RX Monitoring 6/25/2018   Attestation The Prescription Monitoring Report for this patient was reviewed today. Periodic Controlled Substance Monitoring No signs of potential drug abuse or diversion identified. (Please note that portions of this note were completed with a voice recognition program.  Efforts were made to edit the dictations but occasionally words are mis-transcribed.)    Patient was advised to return to the Emergency Department if there was any worsening.     Brendon York MD (electronically signed)  Attending Emergency Physician         Caprice Forrester MD  05/05/22 8963

## 2022-05-06 ENCOUNTER — CARE COORDINATION (OUTPATIENT)
Dept: CARE COORDINATION | Age: 67
End: 2022-05-06

## 2022-05-06 ENCOUNTER — TELEPHONE (OUTPATIENT)
Dept: FAMILY MEDICINE CLINIC | Age: 67
End: 2022-05-06

## 2022-05-06 NOTE — TELEPHONE ENCOUNTER
[facility] Transitions Initial Follow Up Call    Outreach made within 2 business days of discharge: Yes    Patient: Lulu Nogueira Patient : 1955   MRN: [id number]  Reason for Admission: There are no discharge diagnoses documented for the most recent discharge. Discharge Date: 22       Spoke with: Morse Boxer    Discharge department/facility:  ED      Non-face-to-face services provided:  Scheduled appointment with PCP-22  @ 3:00    [facility] Interactive Patient Contact:  Was patient able to fill all prescriptions: Yes  Was patient instructed to bring all medications to the follow-up visit: Yes  Is patient taking all medications as directed in the discharge summary?  Yes  Does patient understand their discharge instructions: Yes  Does patient have questions or concerns that need addressed prior to 7-14 day follow up office visit: no    Scheduled appointment with PCP within 7-14 days    Follow Up  Future Appointments   Date Time Provider Alina Friend   2022  3:00 PM MD Edwin Delgadillo  [place] - KANE   2022 11:20 AM MD Edwin Delgadillo  [place] - [place]YARYAN   2022  2:00 PM MHC CT MAIN [facility] Pravin Rad   2022  3:00 PM Moe Spears MD [facility]       Matheus Barrera RN

## 2022-05-06 NOTE — CARE COORDINATION
Ambulatory Care Coordination Note  5/6/2022  CM Risk Score: 6  Charlson 10 Year Mortality Risk Score: 100%     ACC: Quin Vallecillo, RN    Summary Note: Patient assigned by analytics to ACM from ER visit  R/t copd exac and GI Bleed w lisa     call to patient , introduced ACM/ role, aware PCP's ACM will be following up upon her return    Patient reports feeling pretty good   Using duonebs   reviewed instructions re using every 2-4 hours when breathlessness is severe  His brother was there helping him  He was up earlier to takes medications, breathing tretment and meal then went back to bed    Using 3 lpm of supp O2, typically uses 2 lpm at night and prn daily  Has all inhalers uses Trelegy daily and Alb HFA when out / wheezing   Now has duonebs for nebulizer and can use every 2-4 hours  Reviewed ab / pl breathing and relaxation techniques    Recent loss ( 3/1/21) of wife from TB of lung, had hospice     Discussed ACP, he has had conversations w brother, but has not completed paperwork   will mail out paperwork he declind referral to ACP spec for help at this time    Sees DR Jacqui Yoder  GI , had colonoscopy 11/21, due every 3 years  Will need to follow up due to GI bleed w lisa  Patient did not have pen or AVS ( brother was picking it up as they left it at pharmacy), so this ACM contacted Dr Tiarra Sheffield and PCP office and each office will be calling patient to schedule follow up     PLan    Follow up next week w PCP for repeat labs, Tyler Coyne will be calling him  Return to ER if breathing worsens with use of duonebs every 2-4 hrs  Follow up next week w GI, DR Jacqui Yoder - office will calling to Steph Giron)    Mail out ACP docs      Ambulatory Care Coordination Assessment    Care Coordination Protocol  Referral from Primary Care Provider: No  Week 1 - Initial Assessment     Do you have all of your prescriptions and are they filled?: Yes  Barriers to medication adherence: None  Are you able to afford your medications?: Yes  How often do you have trouble taking your medications the way you have been told to take them?: I always take them as prescribed. Do you have Home O2 Therapy?: Yes   Oxygen Regimen: PRN, At night/Sleep Flow - Enter rate/FIO2: 2   Method of Delivery: Nasal Cannula      Is patient able to live independently?: Yes     Current Housing: Private Residence                 Thinking about your patient's physical health needs, are there any symptoms or problems (risk indicators) you are unsure about that require further investigation?: No identified areas of uncertainly or problems already being investigated   Are the patients physical health problems impacting on their mental well-being?: No identified areas of concern   Are there any problems with your patients lifestyle behaviors (alcohol, drugs, diet, exercise) that are impacting on physical or mental well-being?: No identified areas of concern   Do you have any other concerns about your patients mental well-being?  How would you rate their severity and impact on the patient?: No identified areas of concern   How would you rate their home environment in terms of safety and stability (including domestic violence, insecure housing, neighbor harassment)?: Consistently safe, supportive, stable, no identified problems   How do daily activities impact on the patient's well-being? (include current or anticipated unemployment, work, caregiving, access to transportation or other): No identified problems or perceived positive benefits   How would you rate their social network (family, work, friends)?: Good participation with social networks   How would you rate their financial resources (including ability to afford all required medical care)?: Financially secure, resources adequate, no identified problems   How wells does the patient now understand their health and well-being (symptoms, signs or risk factors) and what they need to do to manage their health?: Reasonable to good understanding and already engages in managing health or is willing to undertake better management   How well do you think your patient can engage in healthcare discussions? (Barriers include language, deafness, aphasia, alcohol or drug problems, learning difficulties, concentration): Clear and open communication, no identified barriers   Do other services need to be involved to help this patient?: Other care/services not required at this time   Are current services involved with this patient well-coordinated? (Include coordination with other services you are now recommendation): All required care/services in place and well-coordinated   Suggested Interventions and [employer]   Other Services or Interventions: 5/6/22- mailed out ACP docs   Grief/ Bereavement Counselor: Not Started         Schedule an appointment with the patient's PCP, Set up/Review an Education Plan, Set up/Review Goals              Prior to Admission medications    Medication Sig Start Date End Date Taking?  Authorizing Provider   ipratropium-albuterol (DUONEB) 0.5-2.5 (3) MG/3ML SOLN nebulizer solution Inhale 3 mLs into the lungs every 4 hours 5/5/22  Yes Bianka Reyes MD   CARTIA  MG extended release capsule TAKE 2 CAPSULES ONCE DAILY 4/27/22  Yes Sacha Mchugh MD   albuterol sulfate  (90 Base) MCG/ACT inhaler INHALE 2 PUFFS EVERY 6 HOURS AS NEEDED FOR WHEEZING 4/25/22  Yes Sacha Mchugh MD   pantoprazole (PROTONIX) 40 MG tablet TAKE ONE TABLET BY MOUTH EVERY MORNING BEFORE BREAKFAST 4/13/22  Yes NAYELY Castaneda - CNP   LANTUS SOLOSTAR 100 UNIT/ML injection pen INJECT 25 UNITS INTO THE SKIN DAILY WITH SUPPER 4/6/22  Yes Sacha Mchugh MD   TRUEplus Lancets 30G MISC TEST 2 TIMES DAILY 4/6/22  Yes Sacha Mchugh MD   TRUE METRIX BLOOD GLUCOSE TEST strip USE 1 STRIP IN VITRO ROUTE 2 TIMES DAILY 4/6/22  Yes Sacha Mchugh MD   allopurinol (ZYLOPRIM) 300 MG tablet TAKE 1 TABLET BY MOUTH DAILY 4/4/22  Yes NAYELY Ackreman CNP   atorvastatin (LIPITOR) 40 MG tablet TAKE 1 TABLET BY MOUTH DAILY 4/4/22  Yes Erick Mayfield MD   DULoxetine (CYMBALTA) 60 MG extended release capsule TAKE ONE CAPSULE BY MOUTH EVERY DAY 2/24/22  Yes Erick Mayfield MD   thiamine 100 MG tablet TAKE 1 TABLET BY MOUTH DAILY 2/24/22  Yes Erick Mayfield MD   fluticasone-umeclidin-vilant (TRELEGY ELLIPTA) 202-87.5-92 MCG/INH AEPB Inhale 1 puff into the lungs daily 2/22/22  Yes Erick Mayfield MD   PENTIPS 31G X 8 MM MISC USE ONCE DAILY WITH INJECTION 1/19/22  Yes Erick Mayfield MD   folic acid (FOLVITE) 1 MG tablet TAKE 1 TABLET BY MOUTH DAILY 1/17/22  Yes Erick Mayfield MD   metoprolol succinate (TOPROL XL) 50 MG extended release tablet TAKE ONE TABLET BY MOUTH EVERY DAY 1/5/22  Yes Erick Mayfield MD   furosemide (LASIX) 20 MG tablet TAKE 1 TABLET BY MOUTH DAILY 7/26/21  Yes Erick Mayfield MD   ondansetron [facility]) 4 MG tablet Take 1 tablet by mouth every 6 hours as needed for Nausea or Vomiting 3/22/21  Yes NAYELY Ackerman CNP   Alcohol Swabs (ALCOHOL PREP) 70 % PADS USE AS DIRECTED 3/15/21  Yes Erick Mayfield MD   oxyCODONE (ROXICODONE) 5 MG immediate release tablet Take 10 mg by mouth every 8 hours as needed for Pain. Yes Historical Provider, MD   gabapentin (NEURONTIN) 300 MG capsule Take 900 mg by mouth 3 times daily.   1/20/20  Yes Historical Provider, MD   Blood Pressure Monitoring (BLOOD PRESSURE CUFF) MISC Please dispense insurance preference 7/18/19  Yes Erick Mayfield MD   Blood Glucose Monitoring Suppl (FREESTYLE FREEDOM LITE) w/Device KIT 1 kit by Does not apply route daily 12/14/18  Yes NAYELY Ackerman CNP   OXYGEN Inhale 3 L/min into the lungs nightly as needed (and daily prn)    Yes Historical Provider, MD   FLOVENT  MCG/ACT inhaler Inhale 1 puff into the lungs 2 times daily  Patient not taking: Reported on 2022   Natalie Pavon PA-C   diclofenac sodium (VOLTAREN) 1 % GEL Apply 4mg of the gel three times daily to the affected knee  Patient not taking: Reported on 2022   Erick Mayfield MD   naloxone [facility]) 4 MG/0.1ML LIQD nasal spray 1 spray by Nasal route as needed for Opioid Reversal May repeat after 3 minutes if no or minimal response as directed  Patient not taking: Reported on 2022    Historical Provider, MD       Future Appointments   Date Time Provider Alina Ronna   2022  3:00 PM Erick Mayfield MD [place]  [place] [facility]   2022 11:20 AM Erick Mayfield MD [place]  [place] [facility]   2022  2:00 PM [facility]   2022  3:00 PM Christiano Rosado MD [facility]     ,   COPD Assessment    Does the patient understand envrionmental exposure?: Yes  Is the patient able to verbalize Rescue vs. Long Acting medications?: Yes  Does the patient have a nebulizer?: Yes  Does the patient use a space with inhaled medications?: No     Shortness of breath (worse than baseline)         Symptoms:  COPD associated wheezing: Pos      Symptom course: improving  Increase use of rapid acting/rescue inhaled medications?: Yes  Self Monitoring - SaO2: Yes  Baseline SaO2 Readin  Have you had a recent diagnosis of pneumonia either by PCP or at a hospital?: No      and   General Assessment    Do you have any symptoms that are causing concern?: No

## 2022-05-08 ENCOUNTER — HOSPITAL ENCOUNTER (INPATIENT)
Age: 67
LOS: 3 days | Discharge: HOME | DRG: 432 | End: 2022-05-11
Attending: INTERNAL MEDICINE | Admitting: INTERNAL MEDICINE
Payer: MEDICARE

## 2022-05-08 PROBLEM — K92.2 GI BLEED: Status: ACTIVE | Noted: 2022-05-08

## 2022-05-08 LAB
A/G RATIO: 1.3 (ref 1.1–2.2)
ALBUMIN SERPL-MCNC: 3.6 G/DL (ref 3.4–5)
ALP BLD-CCNC: 79 U/L (ref 40–129)
ALT SERPL-CCNC: 22 U/L (ref 10–40)
ANION GAP SERPL CALCULATED.3IONS-SCNC: 8 MMOL/L (ref 3–16)
AST SERPL-CCNC: 36 U/L (ref 15–37)
BASOPHILS ABSOLUTE: 0 K/UL (ref 0–0.2)
BASOPHILS RELATIVE PERCENT: 0 %
BILIRUB SERPL-MCNC: 0.4 MG/DL (ref 0–1)
BUN BLDV-MCNC: 31 MG/DL (ref 7–20)
CALCIUM SERPL-MCNC: 8.6 MG/DL (ref 8.3–10.6)
CHLORIDE BLD-SCNC: 93 MMOL/L (ref 99–110)
CO2: 31 MMOL/L (ref 21–32)
CREAT SERPL-MCNC: 1.7 MG/DL (ref 0.8–1.3)
EOSINOPHILS ABSOLUTE: 0 K/UL (ref 0–0.6)
EOSINOPHILS RELATIVE PERCENT: 0 %
GFR AFRICAN AMERICAN: 49
GFR NON-AFRICAN AMERICAN: 41
GLUCOSE BLD-MCNC: 150 MG/DL (ref 70–99)
GLUCOSE BLD-MCNC: 158 MG/DL (ref 70–99)
HCT VFR BLD CALC: 23.8 % (ref 40.5–52.5)
HEMOGLOBIN: 7.3 G/DL (ref 13.5–17.5)
LYMPHOCYTES ABSOLUTE: 0.5 K/UL (ref 1–5.1)
LYMPHOCYTES RELATIVE PERCENT: 1.9 %
MCH RBC QN AUTO: 20.7 PG (ref 26–34)
MCHC RBC AUTO-ENTMCNC: 30.5 G/DL (ref 31–36)
MCV RBC AUTO: 67.7 FL (ref 80–100)
MONOCYTES ABSOLUTE: 0.9 K/UL (ref 0–1.3)
MONOCYTES RELATIVE PERCENT: 3.5 %
NEUTROPHILS ABSOLUTE: 24.1 K/UL (ref 1.7–7.7)
NEUTROPHILS RELATIVE PERCENT: 94.6 %
PDW BLD-RTO: 19.4 % (ref 12.4–15.4)
PERFORMED ON: ABNORMAL
PLATELET # BLD: 262 K/UL (ref 135–450)
PMV BLD AUTO: 7.4 FL (ref 5–10.5)
POTASSIUM REFLEX MAGNESIUM: 4.5 MMOL/L (ref 3.5–5.1)
RBC # BLD: 3.51 M/UL (ref 4.2–5.9)
SODIUM BLD-SCNC: 132 MMOL/L (ref 136–145)
TOTAL PROTEIN: 6.4 G/DL (ref 6.4–8.2)
WBC # BLD: 25.5 K/UL (ref 4–11)

## 2022-05-08 PROCEDURE — 36415 COLL VENOUS BLD VENIPUNCTURE: CPT

## 2022-05-08 PROCEDURE — 6370000000 HC RX 637 (ALT 250 FOR IP): Performed by: NURSE PRACTITIONER

## 2022-05-08 PROCEDURE — 2700000000 HC OXYGEN THERAPY PER DAY

## 2022-05-08 PROCEDURE — 2580000003 HC RX 258: Performed by: NURSE PRACTITIONER

## 2022-05-08 PROCEDURE — C9113 INJ PANTOPRAZOLE SODIUM, VIA: HCPCS | Performed by: NURSE PRACTITIONER

## 2022-05-08 PROCEDURE — 94640 AIRWAY INHALATION TREATMENT: CPT

## 2022-05-08 PROCEDURE — 80053 COMPREHEN METABOLIC PANEL: CPT

## 2022-05-08 PROCEDURE — 96375 TX/PRO/DX INJ NEW DRUG ADDON: CPT

## 2022-05-08 PROCEDURE — 6360000002 HC RX W HCPCS: Performed by: NURSE PRACTITIONER

## 2022-05-08 PROCEDURE — 83036 HEMOGLOBIN GLYCOSYLATED A1C: CPT

## 2022-05-08 PROCEDURE — 94761 N-INVAS EAR/PLS OXIMETRY MLT: CPT

## 2022-05-08 PROCEDURE — 1200000000 HC SEMI PRIVATE

## 2022-05-08 PROCEDURE — 85025 COMPLETE CBC W/AUTO DIFF WBC: CPT

## 2022-05-08 RX ORDER — IPRATROPIUM BROMIDE AND ALBUTEROL SULFATE 2.5; .5 MG/3ML; MG/3ML
1 SOLUTION RESPIRATORY (INHALATION)
Status: DISCONTINUED | OUTPATIENT
Start: 2022-05-08 | End: 2022-05-10

## 2022-05-08 RX ORDER — DULOXETIN HYDROCHLORIDE 60 MG/1
60 CAPSULE, DELAYED RELEASE ORAL DAILY
Status: DISCONTINUED | OUTPATIENT
Start: 2022-05-09 | End: 2022-05-11 | Stop reason: HOSPADM

## 2022-05-08 RX ORDER — FUROSEMIDE 20 MG/1
20 TABLET ORAL DAILY
Status: DISCONTINUED | OUTPATIENT
Start: 2022-05-09 | End: 2022-05-10

## 2022-05-08 RX ORDER — INSULIN LISPRO 100 [IU]/ML
0-3 INJECTION, SOLUTION INTRAVENOUS; SUBCUTANEOUS NIGHTLY
Status: DISCONTINUED | OUTPATIENT
Start: 2022-05-08 | End: 2022-05-11 | Stop reason: HOSPADM

## 2022-05-08 RX ORDER — BUDESONIDE AND FORMOTEROL FUMARATE DIHYDRATE 160; 4.5 UG/1; UG/1
2 AEROSOL RESPIRATORY (INHALATION) 2 TIMES DAILY
Status: DISCONTINUED | OUTPATIENT
Start: 2022-05-08 | End: 2022-05-11 | Stop reason: HOSPADM

## 2022-05-08 RX ORDER — DEXTROSE MONOHYDRATE 50 MG/ML
100 INJECTION, SOLUTION INTRAVENOUS PRN
Status: DISCONTINUED | OUTPATIENT
Start: 2022-05-08 | End: 2022-05-11 | Stop reason: HOSPADM

## 2022-05-08 RX ORDER — ONDANSETRON 4 MG/1
4 TABLET, ORALLY DISINTEGRATING ORAL EVERY 8 HOURS PRN
Status: DISCONTINUED | OUTPATIENT
Start: 2022-05-08 | End: 2022-05-11 | Stop reason: HOSPADM

## 2022-05-08 RX ORDER — SODIUM CHLORIDE 0.9 % (FLUSH) 0.9 %
5-40 SYRINGE (ML) INJECTION PRN
Status: DISCONTINUED | OUTPATIENT
Start: 2022-05-08 | End: 2022-05-11 | Stop reason: HOSPADM

## 2022-05-08 RX ORDER — ONDANSETRON 2 MG/ML
4 INJECTION INTRAMUSCULAR; INTRAVENOUS EVERY 6 HOURS PRN
Status: DISCONTINUED | OUTPATIENT
Start: 2022-05-08 | End: 2022-05-11 | Stop reason: HOSPADM

## 2022-05-08 RX ORDER — NICOTINE POLACRILEX 4 MG
15 LOZENGE BUCCAL PRN
Status: DISCONTINUED | OUTPATIENT
Start: 2022-05-08 | End: 2022-05-08 | Stop reason: CLARIF

## 2022-05-08 RX ORDER — ACETAMINOPHEN 325 MG/1
650 TABLET ORAL EVERY 6 HOURS PRN
Status: DISCONTINUED | OUTPATIENT
Start: 2022-05-08 | End: 2022-05-11 | Stop reason: HOSPADM

## 2022-05-08 RX ORDER — INSULIN LISPRO 100 [IU]/ML
0-6 INJECTION, SOLUTION INTRAVENOUS; SUBCUTANEOUS
Status: DISCONTINUED | OUTPATIENT
Start: 2022-05-08 | End: 2022-05-11 | Stop reason: HOSPADM

## 2022-05-08 RX ORDER — INSULIN GLARGINE 100 [IU]/ML
25 INJECTION, SOLUTION SUBCUTANEOUS
Status: DISCONTINUED | OUTPATIENT
Start: 2022-05-08 | End: 2022-05-11 | Stop reason: HOSPADM

## 2022-05-08 RX ORDER — DEXTROSE MONOHYDRATE 25 G/50ML
12.5 INJECTION, SOLUTION INTRAVENOUS PRN
Status: DISCONTINUED | OUTPATIENT
Start: 2022-05-08 | End: 2022-05-08 | Stop reason: CLARIF

## 2022-05-08 RX ORDER — ACETAMINOPHEN 650 MG/1
650 SUPPOSITORY RECTAL EVERY 6 HOURS PRN
Status: DISCONTINUED | OUTPATIENT
Start: 2022-05-08 | End: 2022-05-11 | Stop reason: HOSPADM

## 2022-05-08 RX ORDER — IPRATROPIUM BROMIDE AND ALBUTEROL SULFATE 2.5; .5 MG/3ML; MG/3ML
1 SOLUTION RESPIRATORY (INHALATION) EVERY 4 HOURS PRN
Status: DISCONTINUED | OUTPATIENT
Start: 2022-05-08 | End: 2022-05-11 | Stop reason: HOSPADM

## 2022-05-08 RX ORDER — SODIUM CHLORIDE 9 MG/ML
INJECTION, SOLUTION INTRAVENOUS PRN
Status: DISCONTINUED | OUTPATIENT
Start: 2022-05-08 | End: 2022-05-11 | Stop reason: HOSPADM

## 2022-05-08 RX ORDER — SODIUM CHLORIDE 0.9 % (FLUSH) 0.9 %
5-40 SYRINGE (ML) INJECTION EVERY 12 HOURS SCHEDULED
Status: DISCONTINUED | OUTPATIENT
Start: 2022-05-08 | End: 2022-05-11 | Stop reason: HOSPADM

## 2022-05-08 RX ORDER — FOLIC ACID 1 MG/1
1000 TABLET ORAL DAILY
Status: DISCONTINUED | OUTPATIENT
Start: 2022-05-09 | End: 2022-05-11 | Stop reason: HOSPADM

## 2022-05-08 RX ORDER — DILTIAZEM HYDROCHLORIDE 180 MG/1
180 CAPSULE, COATED, EXTENDED RELEASE ORAL DAILY
Status: DISCONTINUED | OUTPATIENT
Start: 2022-05-09 | End: 2022-05-11 | Stop reason: HOSPADM

## 2022-05-08 RX ORDER — METHYLPREDNISOLONE SODIUM SUCCINATE 40 MG/ML
40 INJECTION, POWDER, LYOPHILIZED, FOR SOLUTION INTRAMUSCULAR; INTRAVENOUS EVERY 12 HOURS
Status: COMPLETED | OUTPATIENT
Start: 2022-05-08 | End: 2022-05-10

## 2022-05-08 RX ORDER — IPRATROPIUM BROMIDE AND ALBUTEROL SULFATE 2.5; .5 MG/3ML; MG/3ML
1 SOLUTION RESPIRATORY (INHALATION) EVERY 4 HOURS
Status: DISCONTINUED | OUTPATIENT
Start: 2022-05-08 | End: 2022-05-08

## 2022-05-08 RX ORDER — ATORVASTATIN CALCIUM 40 MG/1
40 TABLET, FILM COATED ORAL DAILY
Status: DISCONTINUED | OUTPATIENT
Start: 2022-05-09 | End: 2022-05-11 | Stop reason: HOSPADM

## 2022-05-08 RX ORDER — ALLOPURINOL 300 MG/1
300 TABLET ORAL DAILY
Status: DISCONTINUED | OUTPATIENT
Start: 2022-05-09 | End: 2022-05-11 | Stop reason: HOSPADM

## 2022-05-08 RX ADMIN — IPRATROPIUM BROMIDE AND ALBUTEROL SULFATE 3 ML: .5; 3 SOLUTION RESPIRATORY (INHALATION) at 20:21

## 2022-05-08 RX ADMIN — METHYLPREDNISOLONE SODIUM SUCCINATE 40 MG: 40 INJECTION, POWDER, FOR SOLUTION INTRAMUSCULAR; INTRAVENOUS at 22:03

## 2022-05-08 RX ADMIN — Medication 40 MG: at 22:03

## 2022-05-08 RX ADMIN — INSULIN GLARGINE 25 UNITS: 100 INJECTION, SOLUTION SUBCUTANEOUS at 22:04

## 2022-05-08 RX ADMIN — Medication 2 PUFF: at 20:21

## 2022-05-08 ASSESSMENT — PAIN SCALES - GENERAL: PAINLEVEL_OUTOF10: 7

## 2022-05-08 NOTE — PROGRESS NOTES
Admit to med-surg, tele  GI Bleed from [facility]  H/o cirrhosis, drop in H&H  COPD exacerbation    Gayle Fearing [facility]  5/8/2022

## 2022-05-08 NOTE — PROGRESS NOTES
Consult has been called to Dr. Anibal Holliday on 5/8/22. Spoke with rick.  6:22 PM    Pavithra Kidney  5/8/2022

## 2022-05-09 ENCOUNTER — APPOINTMENT (OUTPATIENT)
Dept: ULTRASOUND IMAGING | Age: 67
DRG: 432 | End: 2022-05-09
Attending: INTERNAL MEDICINE
Payer: MEDICARE

## 2022-05-09 ENCOUNTER — ANESTHESIA (OUTPATIENT)
Dept: ENDOSCOPY | Age: 67
DRG: 432 | End: 2022-05-09
Payer: MEDICARE

## 2022-05-09 ENCOUNTER — ANESTHESIA EVENT (OUTPATIENT)
Dept: ENDOSCOPY | Age: 67
DRG: 432 | End: 2022-05-09
Payer: MEDICARE

## 2022-05-09 VITALS
DIASTOLIC BLOOD PRESSURE: 64 MMHG | OXYGEN SATURATION: 97 % | SYSTOLIC BLOOD PRESSURE: 124 MMHG | RESPIRATION RATE: 14 BRPM

## 2022-05-09 LAB
A/G RATIO: 1.3 (ref 1.1–2.2)
ALBUMIN SERPL-MCNC: 3.5 G/DL (ref 3.4–5)
ALBUMIN SERPL-MCNC: 3.5 G/DL (ref 3.4–5)
ALP BLD-CCNC: 73 U/L (ref 40–129)
ALT SERPL-CCNC: 22 U/L (ref 10–40)
ANION GAP SERPL CALCULATED.3IONS-SCNC: 11 MMOL/L (ref 3–16)
ANION GAP SERPL CALCULATED.3IONS-SCNC: 7 MMOL/L (ref 3–16)
AST SERPL-CCNC: 27 U/L (ref 15–37)
BASOPHILS ABSOLUTE: 0 K/UL (ref 0–0.2)
BASOPHILS RELATIVE PERCENT: 0 %
BILIRUB SERPL-MCNC: 0.3 MG/DL (ref 0–1)
BILIRUBIN URINE: NEGATIVE
BLOOD, URINE: NEGATIVE
BUN BLDV-MCNC: 32 MG/DL (ref 7–20)
BUN BLDV-MCNC: 33 MG/DL (ref 7–20)
CALCIUM SERPL-MCNC: 8.6 MG/DL (ref 8.3–10.6)
CALCIUM SERPL-MCNC: 8.7 MG/DL (ref 8.3–10.6)
CHLORIDE BLD-SCNC: 94 MMOL/L (ref 99–110)
CHLORIDE BLD-SCNC: 95 MMOL/L (ref 99–110)
CLARITY: CLEAR
CO2: 28 MMOL/L (ref 21–32)
CO2: 33 MMOL/L (ref 21–32)
COLOR: YELLOW
CREAT SERPL-MCNC: 1.4 MG/DL (ref 0.8–1.3)
CREAT SERPL-MCNC: 1.6 MG/DL (ref 0.8–1.3)
EOSINOPHILS ABSOLUTE: 0 K/UL (ref 0–0.6)
EOSINOPHILS RELATIVE PERCENT: 0 %
ESTIMATED AVERAGE GLUCOSE: 148.5 MG/DL
GFR AFRICAN AMERICAN: 53
GFR AFRICAN AMERICAN: >60
GFR NON-AFRICAN AMERICAN: 44
GFR NON-AFRICAN AMERICAN: 51
GLUCOSE BLD-MCNC: 181 MG/DL (ref 70–99)
GLUCOSE BLD-MCNC: 184 MG/DL (ref 70–99)
GLUCOSE BLD-MCNC: 190 MG/DL (ref 70–99)
GLUCOSE BLD-MCNC: 211 MG/DL (ref 70–99)
GLUCOSE BLD-MCNC: 213 MG/DL (ref 70–99)
GLUCOSE BLD-MCNC: 262 MG/DL (ref 70–99)
GLUCOSE BLD-MCNC: 270 MG/DL (ref 70–99)
GLUCOSE BLD-MCNC: 354 MG/DL (ref 70–99)
GLUCOSE URINE: NEGATIVE MG/DL
HBA1C MFR BLD: 6.8 %
HCT VFR BLD CALC: 23.6 % (ref 40.5–52.5)
HCT VFR BLD CALC: 24.1 % (ref 40.5–52.5)
HCT VFR BLD CALC: 24.2 % (ref 40.5–52.5)
HCT VFR BLD CALC: 24.6 % (ref 40.5–52.5)
HEMATOLOGY PATH CONSULT: NORMAL
HEMOGLOBIN: 7.2 G/DL (ref 13.5–17.5)
HEMOGLOBIN: 7.2 G/DL (ref 13.5–17.5)
HEMOGLOBIN: 7.4 G/DL (ref 13.5–17.5)
HEMOGLOBIN: 7.5 G/DL (ref 13.5–17.5)
INR BLD: 1.17 (ref 0.88–1.12)
KETONES, URINE: NEGATIVE MG/DL
LEUKOCYTE ESTERASE, URINE: NEGATIVE
LYMPHOCYTES ABSOLUTE: 0.5 K/UL (ref 1–5.1)
LYMPHOCYTES RELATIVE PERCENT: 2.8 %
MCH RBC QN AUTO: 20.6 PG (ref 26–34)
MCHC RBC AUTO-ENTMCNC: 30.6 G/DL (ref 31–36)
MCV RBC AUTO: 67.2 FL (ref 80–100)
MICROSCOPIC EXAMINATION: NORMAL
MONOCYTES ABSOLUTE: 0.7 K/UL (ref 0–1.3)
MONOCYTES RELATIVE PERCENT: 3.6 %
NEUTROPHILS ABSOLUTE: 17.7 K/UL (ref 1.7–7.7)
NEUTROPHILS RELATIVE PERCENT: 93.6 %
NITRITE, URINE: NEGATIVE
PDW BLD-RTO: 19.4 % (ref 12.4–15.4)
PERFORMED ON: ABNORMAL
PH UA: 6.5 (ref 5–8)
PHOSPHORUS: 4.7 MG/DL (ref 2.5–4.9)
PLATELET # BLD: 258 K/UL (ref 135–450)
PMV BLD AUTO: 7.3 FL (ref 5–10.5)
POTASSIUM REFLEX MAGNESIUM: 4.6 MMOL/L (ref 3.5–5.1)
POTASSIUM SERPL-SCNC: 4.5 MMOL/L (ref 3.5–5.1)
PROTEIN UA: NEGATIVE MG/DL
PROTHROMBIN TIME: 13.3 SEC (ref 9.9–12.7)
RBC # BLD: 3.51 M/UL (ref 4.2–5.9)
SODIUM BLD-SCNC: 133 MMOL/L (ref 136–145)
SODIUM BLD-SCNC: 135 MMOL/L (ref 136–145)
SPECIFIC GRAVITY UA: 1.01 (ref 1–1.03)
TOTAL PROTEIN: 6.1 G/DL (ref 6.4–8.2)
URINE REFLEX TO CULTURE: NORMAL
URINE TYPE: NORMAL
UROBILINOGEN, URINE: 1 E.U./DL
WBC # BLD: 18.9 K/UL (ref 4–11)

## 2022-05-09 PROCEDURE — 94761 N-INVAS EAR/PLS OXIMETRY MLT: CPT

## 2022-05-09 PROCEDURE — 85610 PROTHROMBIN TIME: CPT

## 2022-05-09 PROCEDURE — 3700000001 HC ADD 15 MINUTES (ANESTHESIA): Performed by: INTERNAL MEDICINE

## 2022-05-09 PROCEDURE — 6360000002 HC RX W HCPCS: Performed by: NURSE ANESTHETIST, CERTIFIED REGISTERED

## 2022-05-09 PROCEDURE — 76770 US EXAM ABDO BACK WALL COMP: CPT

## 2022-05-09 PROCEDURE — 81003 URINALYSIS AUTO W/O SCOPE: CPT

## 2022-05-09 PROCEDURE — 36415 COLL VENOUS BLD VENIPUNCTURE: CPT

## 2022-05-09 PROCEDURE — 80053 COMPREHEN METABOLIC PANEL: CPT

## 2022-05-09 PROCEDURE — 7100000010 HC PHASE II RECOVERY - FIRST 15 MIN: Performed by: INTERNAL MEDICINE

## 2022-05-09 PROCEDURE — 3609012400 HC EGD TRANSORAL BIOPSY SINGLE/MULTIPLE: Performed by: INTERNAL MEDICINE

## 2022-05-09 PROCEDURE — 85018 HEMOGLOBIN: CPT

## 2022-05-09 PROCEDURE — 82105 ALPHA-FETOPROTEIN SERUM: CPT

## 2022-05-09 PROCEDURE — 85014 HEMATOCRIT: CPT

## 2022-05-09 PROCEDURE — 0DB68ZX EXCISION OF STOMACH, VIA NATURAL OR ARTIFICIAL OPENING ENDOSCOPIC, DIAGNOSTIC: ICD-10-PCS | Performed by: INTERNAL MEDICINE

## 2022-05-09 PROCEDURE — 6370000000 HC RX 637 (ALT 250 FOR IP): Performed by: INTERNAL MEDICINE

## 2022-05-09 PROCEDURE — 2709999900 HC NON-CHARGEABLE SUPPLY: Performed by: INTERNAL MEDICINE

## 2022-05-09 PROCEDURE — 2500000003 HC RX 250 WO HCPCS: Performed by: NURSE ANESTHETIST, CERTIFIED REGISTERED

## 2022-05-09 PROCEDURE — C9113 INJ PANTOPRAZOLE SODIUM, VIA: HCPCS | Performed by: NURSE PRACTITIONER

## 2022-05-09 PROCEDURE — 94669 MECHANICAL CHEST WALL OSCILL: CPT

## 2022-05-09 PROCEDURE — 99232 SBSQ HOSP IP/OBS MODERATE 35: CPT | Performed by: INTERNAL MEDICINE

## 2022-05-09 PROCEDURE — 96376 TX/PRO/DX INJ SAME DRUG ADON: CPT

## 2022-05-09 PROCEDURE — 6360000002 HC RX W HCPCS: Performed by: NURSE PRACTITIONER

## 2022-05-09 PROCEDURE — 2700000000 HC OXYGEN THERAPY PER DAY

## 2022-05-09 PROCEDURE — 2580000003 HC RX 258: Performed by: NURSE ANESTHETIST, CERTIFIED REGISTERED

## 2022-05-09 PROCEDURE — 2580000003 HC RX 258: Performed by: NURSE PRACTITIONER

## 2022-05-09 PROCEDURE — 6370000000 HC RX 637 (ALT 250 FOR IP): Performed by: NURSE PRACTITIONER

## 2022-05-09 PROCEDURE — 85025 COMPLETE CBC W/AUTO DIFF WBC: CPT

## 2022-05-09 PROCEDURE — 88305 TISSUE EXAM BY PATHOLOGIST: CPT

## 2022-05-09 PROCEDURE — 3700000000 HC ANESTHESIA ATTENDED CARE: Performed by: INTERNAL MEDICINE

## 2022-05-09 PROCEDURE — 1200000000 HC SEMI PRIVATE

## 2022-05-09 PROCEDURE — 7100000011 HC PHASE II RECOVERY - ADDTL 15 MIN: Performed by: INTERNAL MEDICINE

## 2022-05-09 PROCEDURE — 94640 AIRWAY INHALATION TREATMENT: CPT

## 2022-05-09 RX ORDER — PROPOFOL 10 MG/ML
INJECTION, EMULSION INTRAVENOUS PRN
Status: DISCONTINUED | OUTPATIENT
Start: 2022-05-09 | End: 2022-05-09 | Stop reason: SDUPTHER

## 2022-05-09 RX ORDER — LIDOCAINE HYDROCHLORIDE 20 MG/ML
INJECTION, SOLUTION INFILTRATION; PERINEURAL PRN
Status: DISCONTINUED | OUTPATIENT
Start: 2022-05-09 | End: 2022-05-09 | Stop reason: SDUPTHER

## 2022-05-09 RX ORDER — SODIUM CHLORIDE, SODIUM LACTATE, POTASSIUM CHLORIDE, CALCIUM CHLORIDE 600; 310; 30; 20 MG/100ML; MG/100ML; MG/100ML; MG/100ML
INJECTION, SOLUTION INTRAVENOUS CONTINUOUS PRN
Status: DISCONTINUED | OUTPATIENT
Start: 2022-05-09 | End: 2022-05-09 | Stop reason: SDUPTHER

## 2022-05-09 RX ADMIN — Medication 40 MG: at 21:58

## 2022-05-09 RX ADMIN — SODIUM CHLORIDE, POTASSIUM CHLORIDE, SODIUM LACTATE AND CALCIUM CHLORIDE: 600; 310; 30; 20 INJECTION, SOLUTION INTRAVENOUS at 13:40

## 2022-05-09 RX ADMIN — Medication 40 MG: at 11:34

## 2022-05-09 RX ADMIN — PROPOFOL 50 MG: 10 INJECTION, EMULSION INTRAVENOUS at 13:51

## 2022-05-09 RX ADMIN — SODIUM CHLORIDE, PRESERVATIVE FREE 10 ML: 5 INJECTION INTRAVENOUS at 21:00

## 2022-05-09 RX ADMIN — INSULIN GLARGINE 25 UNITS: 100 INJECTION, SOLUTION SUBCUTANEOUS at 18:15

## 2022-05-09 RX ADMIN — INSULIN LISPRO 5 UNITS: 100 INJECTION, SOLUTION INTRAVENOUS; SUBCUTANEOUS at 18:16

## 2022-05-09 RX ADMIN — IPRATROPIUM BROMIDE AND ALBUTEROL SULFATE 3 ML: 2.5; .5 SOLUTION RESPIRATORY (INHALATION) at 15:28

## 2022-05-09 RX ADMIN — PROPOFOL 50 MG: 10 INJECTION, EMULSION INTRAVENOUS at 13:52

## 2022-05-09 RX ADMIN — Medication 2 PUFF: at 07:30

## 2022-05-09 RX ADMIN — PROPOFOL 50 MG: 10 INJECTION, EMULSION INTRAVENOUS at 13:47

## 2022-05-09 RX ADMIN — METHYLPREDNISOLONE SODIUM SUCCINATE 40 MG: 40 INJECTION, POWDER, FOR SOLUTION INTRAMUSCULAR; INTRAVENOUS at 06:20

## 2022-05-09 RX ADMIN — POLYETHYLENE GLYCOL 3350, SODIUM SULFATE ANHYDROUS, SODIUM BICARBONATE, SODIUM CHLORIDE, POTASSIUM CHLORIDE 4000 ML: 236; 22.74; 6.74; 5.86; 2.97 POWDER, FOR SOLUTION ORAL at 18:34

## 2022-05-09 RX ADMIN — INSULIN LISPRO 2 UNITS: 100 INJECTION, SOLUTION INTRAVENOUS; SUBCUTANEOUS at 21:59

## 2022-05-09 RX ADMIN — DILTIAZEM HYDROCHLORIDE 180 MG: 180 CAPSULE, COATED, EXTENDED RELEASE ORAL at 11:33

## 2022-05-09 RX ADMIN — PROPOFOL 50 MG: 10 INJECTION, EMULSION INTRAVENOUS at 13:48

## 2022-05-09 RX ADMIN — LIDOCAINE HYDROCHLORIDE 40 MG: 20 INJECTION, SOLUTION INFILTRATION; PERINEURAL at 13:46

## 2022-05-09 RX ADMIN — SODIUM CHLORIDE, PRESERVATIVE FREE 10 ML: 5 INJECTION INTRAVENOUS at 11:44

## 2022-05-09 RX ADMIN — IPRATROPIUM BROMIDE AND ALBUTEROL SULFATE 3 ML: 2.5; .5 SOLUTION RESPIRATORY (INHALATION) at 23:09

## 2022-05-09 RX ADMIN — Medication 2 PUFF: at 20:00

## 2022-05-09 RX ADMIN — METHYLPREDNISOLONE SODIUM SUCCINATE 40 MG: 40 INJECTION, POWDER, FOR SOLUTION INTRAMUSCULAR; INTRAVENOUS at 18:14

## 2022-05-09 RX ADMIN — PROPOFOL 100 MG: 10 INJECTION, EMULSION INTRAVENOUS at 13:46

## 2022-05-09 RX ADMIN — IPRATROPIUM BROMIDE AND ALBUTEROL SULFATE 3 ML: 2.5; .5 SOLUTION RESPIRATORY (INHALATION) at 07:30

## 2022-05-09 RX ADMIN — IPRATROPIUM BROMIDE AND ALBUTEROL SULFATE 3 ML: 2.5; .5 SOLUTION RESPIRATORY (INHALATION) at 19:59

## 2022-05-09 RX ADMIN — IPRATROPIUM BROMIDE AND ALBUTEROL SULFATE 3 ML: 2.5; .5 SOLUTION RESPIRATORY (INHALATION) at 11:48

## 2022-05-09 ASSESSMENT — PAIN - FUNCTIONAL ASSESSMENT: PAIN_FUNCTIONAL_ASSESSMENT: 0-10

## 2022-05-09 ASSESSMENT — LIFESTYLE VARIABLES: SMOKING_STATUS: 1

## 2022-05-09 ASSESSMENT — ENCOUNTER SYMPTOMS: SHORTNESS OF BREATH: 1

## 2022-05-09 ASSESSMENT — PAIN SCALES - GENERAL: PAINLEVEL_OUTOF10: 0

## 2022-05-09 NOTE — PROGRESS NOTES
Patient admitted to room 205 from ER. Patient oriented to room, call light, bed rails, phone, lights and bathroom. Patient instructed about the schedule of the day including: vital sign frequency, lab draws, possible tests, frequency of MD and staff rounds, daily weights, I &O's and prescribed diet. Telemetry box in place, patient aware of placement and reason. Bed locked, in lowest position, side rails up 2/4, call light within reach. Bed alarm in place. Recliner Assessment:     Patient is able to demonstrate the ability to move from a reclining position to an upright position within the recliner. 4 Eyes Skin Assessment     The patient is being assess for   Admission    I agree that 2 RN's have performed a thorough Head to Toe Skin Assessment on the patient. ALL assessment sites listed below have been assessed. Areas assessed for pressure by both nurses:   [x]   Head, Face, and Ears   [x]   Shoulders, Back, and Chest, Abdomen  [x]   Arms, Elbows, and Hands   [x]   Coccyx, Sacrum, and Ischium  [x]   Legs, Feet, and Heels    Scattered bruising to BUE. Skin Assessed Under all Medical Devices by both nurses:  O2 device tubing              All Mepilex Borders were peeled back and area peeked at by both nurses:  No: None  Please list where Mepilex Borders are located:  N/A             **SHARE this note so that the co-signing nurse is able to place an eSignature**    Co-signer eSignature: Electronically signed by Jose Alejandro Smith RN on 5/9/22 at 4:31 AM EDT    Does the Patient have Skin Breakdown related to pressure?   No          Abraham Prevention initiated:  NA   Wound Care Orders initiated:  NA      [facility] nurse consulted for Pressure Injury (Stage 3,4, Unstageable, DTI, NWPT, Complex wounds)and New or Established Ostomies:  NA      Primary Nurse eSignature: Electronically signed by Sharon Stovall RN on 5/9/22 at 3:27 AM EDT

## 2022-05-09 NOTE — PLAN OF CARE
Problem: Discharge Planning  Goal: Discharge to home or other facility with appropriate resources  Outcome: Progressing     Problem: Pain  Goal: Verbalizes/displays adequate comfort level or baseline comfort level  Outcome: Progressing     Problem: Safety - Adult  Goal: Free from fall injury  Outcome: Progressing     Problem: Skin/Tissue Integrity  Goal: Absence of new skin breakdown  Description: 1. Monitor for areas of redness and/or skin breakdown  2. Assess vascular access sites hourly  3. Every 4-6 hours minimum:  Change oxygen saturation probe site  4. Every 4-6 hours:  If on nasal continuous positive airway pressure, respiratory therapy assess nares and determine need for appliance change or resting period.   Outcome: Progressing

## 2022-05-09 NOTE — FLOWSHEET NOTE
05/09/22 1455   Vitals   Temp 96.9 °F (36.1 °C)   Temp Source Oral   Pulse 95   Heart Rate Source Monitor   Resp 16   /68   BP Location Left upper arm   BP Upper/Lower Upper   BP Method Automatic   Level of Consciousness Alert (0)   MEWS Score 1   Pain Assessment   Pain Assessment None - Denies Pain   Oxygen Therapy   SpO2 96 %   O2 Device Nasal cannula   O2 Flow Rate (L/min) 4 L/min  (changed to 3 )   pt returned from ENDO, requested diet notified dietary

## 2022-05-09 NOTE — PROCEDURES
Endoscopy Note    Patient: Neli Hawkins  :   Acct#:     Procedure: Esophagogastroduodenoscopy with biopsy    Date:  2022     Surgeon:  Kashif Mckeon MD, MD    Referring Physician:  Rell Bill MD      Preoperative Diagnosis: Fe def Anemia, melena. Patient has history of alcohol liver cirrhosis      Anesthesia:  MAC      Consent:  The patient or their legal guardian has signed an informed consent, and is aware of the potential risks, benefits, alternatives, and potential complications of this procedure. These include, but are not limited to hemorrhage, bleeding, post procedural pain, perforation, phlebitis, aspiration, hypotension, hypoxia, cardiovascular events such as arryhthmia, and possibly death. Description of Procedure: The patient was then taken to the endoscopy suite, placed in the left lateral decubitus position and the above IV sedation was administrered. The Olympus video endoscope was placed through the patient's oropharynx without difficulty to the extent of the 2nd portion of the duodenum. The patient tolerated the procedure well and was taken to the post anesthesia care unit in good condition. Complications: None  EBL: none      Findings:  Esophagus:  Visualization of the esophagus demonstrated small grade 1 esophageal varices in the distal esophagus. GE junction otherwise unremarkable. Stomach: The scope was then advanced into the stomach. Both forward and retroflexed views of the stomach were obtained. Visualization of the gastric body and antrum demonstrated mild erythema, biopsies were obtained. A retroflexed exam of the gastric cardia and fundus demonstrated normal mucosa. The pylorus was patent and the scope was advanced into the duodenum. Duodenum: Visualization of the duodenal bulb and the second portion of the duodenum demonstrated normal mucosa.   The scope was then withdrawn back into the stomach, it was decompressed, and the scope was completely withdrawn. Impression:    Mild antral gastritis  Small grade 1 esophageal varices      Recommendations:   · Plan for colonoscopy tomorrow. Prep with GoLytely today. Clear liquid diet today.   N.p.o. after midnight  · Follow-up hemoglobin/hematocrit  · Recommend iron infusions  · Continue with symptomatic and supportive care          Kevan Marina MD  [id number]  [phone]

## 2022-05-09 NOTE — ANESTHESIA PRE PROCEDURE
Department of Anesthesiology  Preprocedure Note       Name:  Micki Page   Age:  73 y.o.  :  1955                                          MRN:  [id number]         Date:  2022      Surgeon: Letha De La Cruz):  Avis Son MD    Procedure: Procedure(s):  EGD W/ANES. Medications prior to admission:   Prior to Admission medications    Medication Sig Start Date End Date Taking?  Authorizing Provider   ipratropium-albuterol (DUONEB) 0.5-2.5 (3) MG/3ML SOLN nebulizer solution Inhale 3 mLs into the lungs every 4 hours 22   Concetta Masterson MD   CARTIA  MG extended release capsule TAKE 2 CAPSULES ONCE DAILY 22   Darryle Slain, MD   albuterol sulfate  (90 Base) MCG/ACT inhaler INHALE 2 PUFFS EVERY 6 HOURS AS NEEDED FOR WHEEZING 22   Darryle Slain, MD   pantoprazole (PROTONIX) 40 MG tablet TAKE ONE TABLET BY MOUTH EVERY MORNING BEFORE BREAKFAST 22   NAYELY Lim CNP   LANTUS SOLOSTAR 100 UNIT/ML injection pen INJECT 25 UNITS INTO THE SKIN DAILY WITH SUPPER 22   Darryle Slain, MD   TRUEplus Lancets 30G MISC TEST 2 TIMES DAILY 22   Darryle Slain, MD   TRUE METRIX BLOOD GLUCOSE TEST strip USE 1 STRIP IN VITRO ROUTE 2 TIMES DAILY 22   Darryle Slain, MD   allopurinol (ZYLOPRIM) 300 MG tablet TAKE 1 TABLET BY MOUTH DAILY 22   NAYELY Lim CNP   atorvastatin (LIPITOR) 40 MG tablet TAKE 1 TABLET BY MOUTH DAILY 22   Darryle Slain, MD   DULoxetine (CYMBALTA) 60 MG extended release capsule TAKE ONE CAPSULE BY MOUTH EVERY DAY 22   Darryle Slain, MD   [facility] 110 MCG/ACT inhaler Inhale 1 puff into the lungs 2 times daily  Patient not taking: Reported on 2022   Banner, PA-C   thiamine 100 MG tablet TAKE 1 TABLET BY MOUTH DAILY 22   Darryle Slain, MD   fluticasone-umeclidin-vilant (Deshawn Cloud) 188-66.0-82 MCG/INH AEPB Inhale 1 puff into the lungs daily 2/22/22   Carmen Oscar MD   PENTIPS 31G X 8 MM MISC USE ONCE DAILY WITH INJECTION 1/19/22   Carmen Oscar MD   folic acid (FOLVITE) 1 MG tablet TAKE 1 TABLET BY MOUTH DAILY 1/17/22   Carmen Oscar MD   metoprolol succinate (TOPROL XL) 50 MG extended release tablet TAKE ONE TABLET BY MOUTH EVERY DAY 1/5/22   Carmen Oscar MD   furosemide (LASIX) 20 MG tablet TAKE 1 TABLET BY MOUTH DAILY 7/26/21   Carmen Oscar MD   diclofenac sodium (VOLTAREN) 1 % GEL Apply 4mg of the gel three times daily to the affected knee  Patient not taking: Reported on 5/6/2022 4/14/21   Carmen Oscar MD   ondansetron [facility]) 4 MG tablet Take 1 tablet by mouth every 6 hours as needed for Nausea or Vomiting 3/22/21   NAYELY Narayan CNP   Alcohol Swabs (ALCOHOL PREP) 70 % PADS USE AS DIRECTED 3/15/21   Carmen Oscar MD   oxyCODONE (ROXICODONE) 5 MG immediate release tablet Take 10 mg by mouth every 8 hours as needed for Pain. Historical Provider, MD   naloxone [facility]) 4 MG/0.1ML LIQD nasal spray 1 spray by Nasal route as needed for Opioid Reversal May repeat after 3 minutes if no or minimal response as directed  Patient not taking: Reported on 5/6/2022    Historical Provider, MD   gabapentin (NEURONTIN) 300 MG capsule Take 900 mg by mouth 3 times daily.   1/20/20   Historical Provider, MD   Blood Pressure Monitoring (BLOOD PRESSURE CUFF) MISC Please dispense insurance preference 7/18/19   Carmen Oscar MD   Blood Glucose Monitoring Suppl (FREESTYLE FREEDOM LITE) w/Device KIT 1 kit by Does not apply route daily 12/14/18   NAYELY Narayan CNP   OXYGEN Inhale 3 L/min into the lungs nightly as needed (and daily prn)     Historical Provider, MD       Current medications:    Current Facility-Administered Medications   Medication Dose Route Frequency Provider Last Rate Last Admin    octreotide (SANDOSTATIN) 500 mcg in sodium chloride 0.9 % 100 mL infusion  25 mcg/hr IntraVENous Continuous Sherrill Jeffery, APRN - CNP        allopurinol (ZYLOPRIM) tablet 300 mg  300 mg Oral Daily [facility], APRN - CNP        atorvastatin (LIPITOR) tablet 40 mg  40 mg Oral Daily [facility], APRN - CNP        dilTIAZem (CARDIZEM CD) extended release capsule 180 mg  180 mg Oral Daily [facility], APRN - CNP   180 mg at 05/09/22 1133    DULoxetine (CYMBALTA) extended release capsule 60 mg  60 mg Oral Daily [facility], APRN - CNP        folic acid (FOLVITE) tablet 1,000 mcg  1,000 mcg Oral Daily [facility], APRN - CNP        [Held by provider] furosemide (LASIX) tablet 20 mg  20 mg Oral Daily [facility], APRN - CNP        insulin glargine (LANTUS) injection vial 25 Units  25 Units SubCUTAneous Dinner [facility], APRN - CNP   25 Units at 05/08/22 2204    glucagon (rDNA) injection 1 mg  1 mg IntraMUSCular PRN [facility], APRN - CNP        dextrose 5 % solution  100 mL/hr IntraVENous PRN [facility], APRN - CNP        insulin lispro (HUMALOG) injection vial 0-6 Units  0-6 Units SubCUTAneous TID WC [facility], APRN - CNP        insulin lispro (HUMALOG) injection vial 0-3 Units  0-3 Units SubCUTAneous Nightly [facility], APRN - CNP        sodium chloride flush 0.9 % injection 5-40 mL  5-40 mL IntraVENous 2 times per day [facility], APRN - CNP   10 mL at 05/09/22 1144    sodium chloride flush 0.9 % injection 5-40 mL  5-40 mL IntraVENous PRN [facility], APRN - CNP        0.9 % sodium chloride infusion   IntraVENous PRN [facility], APRN - CNP        ondansetron (ZOFRAN-ODT) disintegrating tablet 4 mg  4 mg Oral Q8H PRN [facility], APRN - CNP        Or    ondansetron [facility]) injection 4 mg  4 mg IntraVENous Q6H PRN Bonnielee Sham, APRN - CNP        acetaminophen (TYLENOL) tablet 650 mg  650 mg Oral Q6H PRN Lyudmila Sham, APRN - CNP        Or    acetaminophen (TYLENOL) suppository 650 mg  650 mg Rectal Q6H PRN Mary Jane Kj, APRN - CNP        methylPREDNISolone sodium (SOLU-MEDROL) injection 40 mg  40 mg IntraVENous Q12H Mary Jane Kj, APRN - CNP   40 mg at 05/09/22 0620    pantoprazole (PROTONIX) 40 mg in sodium chloride (PF) 10 mL injection  40 mg IntraVENous 2 times per day Mary Janejanet Underwood APRN - CNP   40 mg at 05/09/22 1134    glucose chewable tablet 16 g  4 tablet Oral PRN Mary Jane Kj, APRN - CNP        dextrose bolus 10% 125 mL  125 mL IntraVENous PRN Mary Jane Kj, APRN - CNP        Or    dextrose bolus 10% 250 mL  250 mL IntraVENous PRN Mary Jane Kj, APRN - CNP        budesonide-formoterol (SYMBICORT) 160-4.5 MCG/ACT inhaler 2 puff  2 puff Inhalation BID Mary Jane Kj, APRN - CNP   2 puff at 05/09/22 0730    ipratropium-albuterol (DUONEB) nebulizer solution 3 mL  1 vial Inhalation Q4H While awake Marcio Lloyd MD   3 mL at 05/09/22 1148    ipratropium-albuterol (DUONEB) nebulizer solution 1 ampule  1 ampule Inhalation Q4H PRN Marcio Lloyd MD           Allergies:     Allergies   Allergen Reactions    Lisinopril Swelling    Ace Inhibitors Swelling    Influenza Vaccines      He states he was hospitalized when he received his first flu vaccine    Spiriva Handihaler [Tiotropium Bromide Monohydrate] Swelling     Tolerates both tudorza and incruse    Vilanterol        Problem List:    Patient Active Problem List   Diagnosis Code    Hilar adenopathy R59.0    Tobacco abuse Z72.0    GERD (gastroesophageal reflux disease) K21.9    Chest pain R07.9    Gout M10.9    Paresthesia of bilateral legs R20.2    Right knee pain M25.561    Numbness and tingling of right leg R20.0, R20.2    Osteoarthritis of multiple joints M15.9    Supraclavicular fossa fullness R22.2    Carpal tunnel syndrome of left wrist G56.02    Alcohol abuse F10.10    Hyponatremia E87.1    Hepatomegaly R16.0    Chronic alcoholic hepatitis E23.77    Ulnar neuropathy at elbow G56.20    Chronic pain G89.29    Malignant neoplasm of urinary bladder (HCC) C67.9    Mixed simple and mucopurulent chronic bronchitis (HCC) J41.8    Ulnar nerve entrapment G56.20    Mixed hyperlipidemia E78.2    Bladder outlet obstruction N32.0    Diabetic ketoacidosis without coma associated with type 2 diabetes mellitus ([facility] E11.10    Insulin dependent type 2 diabetes mellitus, uncontrolled ([facility] E11.65, Z79.4    Bronchiectasis without complication (HCC) M45.0    Onychomycosis B35.1    Tinea pedis of both feet B35.3    Chronic idiopathic constipation T96.53    Alcoholic cirrhosis of liver without ascites (HCC) K70.30    Family history of rectal cancer Z80.0    Rectal bleeding K62.5    Polyp of colon K63.5    Diverticulosis of intestine without bleeding K57.90    Secondary esophageal varices without bleeding (HCC) I85.10    Erosive gastritis K29.60    Multiple duodenal ulcers K26.9    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, 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.60    Iron deficiency anemia D50.9    AVM (arteriovenous malformation) of small bowel, acquired K55.20    Uncontrolled hypertension I10    Severe claudication (HCC) I73.9    Hypercalcemia E83.52    MICHAEL (acute kidney injury) ([facility] N17.9    Other constipation K59.09    Hypokalemia E87.6    Primary osteoarthritis of right knee M17.11    Venous insufficiency I87.2    Peripheral vascular disease (HCC) I73.9    Diabetic polyneuropathy associated with type 2 diabetes mellitus (HCC) E11.42    Situational anxiety F41.8    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) I74.09    GI bleed K92.2       Past Medical History:        Diagnosis Date    Bronchitis chronic     Chest pain     Chronic cough     Cirrhosis of liver ([facility] 01/01/2017    stage 4     COPD (chronic obstructive pulmonary disease) (HCC)     COPD (chronic obstructive pulmonary disease) (HCC)     Diabetes mellitus (HCC)     Gout     Hilar adenopathy     Hyperlipidemia     Hypertension  Knee pain, right     Numbness and tingling of leg     Osteoarthritis     Paresthesia of bilateral legs     PVD (peripheral vascular disease) (HCC)     Seizures (HCC)     ongoing, began after swine flu vaccination    Substance abuse [facility])        Past Surgical History:        Procedure Laterality Date    AORTO-FEMORAL BYPASS GRAFT  08/2020    AORTO-ILIAC BYPASS GRAFT N/A 8/24/2020    AORTOBIFEMORAL BYPASS GRAFT performed by Beverly Sims MD at [address] [facility]  2/7/2011    bronch with EBUS    COLONOSCOPY N/A 7/23/2018    COLONOSCOPY WITH BIOPSY performed by Annabella Johnson MD at [address] [facility] N/A 7/23/2018    COLONOSCOPY POLYPECTOMY SNARE/COLD BIOPSY performed by Annabella Johnson MD at [address] [facility]  2/4/15    Urethral Dilatation, Resection of Bladder Tumor    CYSTOSCOPY  2/24/2016    fulgeration of bladder tumor    ELBOW SURGERY Left     ENDOSCOPY, SMALL BOWEL N/A 2/20/2019    BOWEL SMALL CAPSULE ENDOSCOPY performed by Annabella Johnson MD at [place] [address]  05-    cystoscopy, bladder biopsy    OTHER SURGICAL HISTORY  09/09/2015    cystoscopy, urethral dilatation, bladder tumor follow up   [address] N/A 7/23/2018    EGD BIOPSY performed by Annabella Johnson MD at [address] History:    Social History     Tobacco Use    Smoking status: Current Every Day Smoker     Packs/day: 1.00     Years: 42.00     Pack years: 42.00     Types: Cigarettes, Cigars     Start date: 9/4/2018    Smokeless tobacco: Former User     Types: Snuff   Substance Use Topics    Alcohol use:  Yes     Alcohol/week: 6.0 standard drinks     Types: 6 Cans of beer per week                                Ready to quit: Not Answered  Counseling given: Not Answered      Vital Signs (Current):   Vitals:    05/09/22 0733 05/09/22 0831 05/09/22 1152 05/09/22 1242   BP:  136/73  (!) 159/69   Pulse:  84  93   Resp: 16 16 18 20   Temp:  97 °F (36.1 °C)  98.8 °F (37.1 °C)   TempSrc:  Oral  Temporal   SpO2: 98% 96% 93% 97%   Weight:       Height:                                                  BP Readings from Last 3 Encounters:   05/09/22 (!) 159/69   05/05/22 (!) 139/59   01/10/22 (!) 160/73       NPO Status: Time of last liquid consumption: 1100 (ice chips)                        Time of last solid consumption: 1300                        Date of last liquid consumption: 05/09/22                        Date of last solid food consumption: 05/08/22    BMI:   Wt Readings from Last 3 Encounters:   05/08/22 228 lb 2.8 oz (103.5 kg)   05/05/22 229 lb (103.9 kg)   01/10/22 226 lb (102.5 kg)     Body mass index is 35.74 kg/m². CBC:   Lab Results   Component Value Date    WBC 18.9 05/09/2022    RBC 3.51 05/09/2022    HGB 7.2 05/09/2022    HCT 23.6 05/09/2022    MCV 67.2 05/09/2022    RDW 19.4 05/09/2022     05/09/2022       CMP:   Lab Results   Component Value Date     05/09/2022    K 4.6 05/09/2022    CL 95 05/09/2022    CO2 33 05/09/2022    BUN 32 05/09/2022    CREATININE 1.4 05/09/2022    GFRAA >60 05/09/2022    GFRAA >60 04/02/2013    AGRATIO 1.3 05/09/2022    LABGLOM 51 05/09/2022    GLUCOSE 184 05/09/2022    PROT 6.1 05/09/2022    PROT 9.1 01/12/2011    CALCIUM 8.7 05/09/2022    BILITOT 0.3 05/09/2022    ALKPHOS 73 05/09/2022    AST 27 05/09/2022    ALT 22 05/09/2022       POC Tests:   Recent Labs     05/09/22  1059   POCGLU 211*       Coags:   Lab Results   Component Value Date    PROTIME 13.3 05/09/2022    INR 1.17 05/09/2022    APTT 31.7 06/29/2019       HCG (If Applicable): No results found for: PREGTESTUR, PREGSERUM, HCG, HCGQUANT     ABGs:   Lab Results   Component Value Date    PHART 7.349 08/24/2020    PO2ART 83.5 08/24/2020    LAQ7DYV 43.8 08/24/2020    NOF4VCJ 23.6 08/24/2020    BEART -2.0 08/24/2020    Y5OLHZOF 94.4 08/24/2020        Type & Screen (If Applicable):   No results found for: Gearl Sol    Drug/Infectious Status (If Applicable):  No results found for: HIV, HEPCAB    COVID-19 Screening (If Applicable):   Lab Results   Component Value Date    COVID19 Not Detected 05/05/2022    COVID19 Not Detected 10/26/2021           Anesthesia Evaluation  Patient summary reviewed and Nursing notes reviewed no history of anesthetic complications:   Airway: Mallampati: III  TM distance: <3 FB   Neck ROM: limited  Mouth opening: > = 3 FB Dental:    (+) edentulous      Pulmonary: breath sounds clear to auscultation  (+) COPD (DAILY AND PRN INHALERS, 2-5L O2 VASTLY):  shortness of breath:  decreased breath sounds,  current smoker (1 PPD, NONE X4 D)    (-) recent URI and sleep apnea          Patient did not smoke on day of surgery. Cardiovascular:    (+) hypertension:, FALCON:, hyperlipidemia    (-) pacemaker, past MI, CAD, CABG/stent, dysrhythmias,  angina and  CHF    ECG reviewed  Rhythm: regular  Rate: normal    Stress test reviewed                Neuro/Psych:   (+) seizures (PT CLAIMS NONE FOR A TIME, NO MEDS CURRENTLY):, neuromuscular disease (NEUROPATHY / CTS / CHRONIC PAIN):, psychiatric history (HX ETOH ABUSE):depression/anxiety    (-) TIA and CVA           GI/Hepatic/Renal:   (+) GERD: well controlled, PUD, liver disease (CIRRHOSIS, ETOH):, renal disease (MICHAEL/CRI): CRI,      (-) bowel prep and no morbid obesity       Endo/Other:    (+) DiabetesType II DM, using insulin, blood dyscrasia (ANEMIA): arthritis (GOUT): OA., electrolyte abnormalities, malignancy/cancer (BLADDER). (-) hypothyroidism, hyperthyroidism               Abdominal:   (+) obese,     Abdomen: soft. Vascular: Other Findings:             Anesthesia Plan      TIVA     ASA 3       Induction: intravenous. MIPS: Prophylactic antiemetics administered. Anesthetic plan and risks discussed with patient. Plan discussed with CRNA.               This pre-anesthesia assessment may be used as a history and physical.    DOS STAFF ADDENDUM:    Pt seen and examined, chart reviewed (including anesthesia, drug and allergy history). No interval changes to history and physical examination. Anesthetic plan, risks, benefits, alternatives, and personnel involved discussed with patient. Patient verbalized an understanding and agrees to proceed.       Nahid Means MD  May 9, 2022  1:35 PM      Nahid Means MD   5/9/2022

## 2022-05-09 NOTE — PROGRESS NOTES
RT Inhaler-Nebulizer Bronchodilator Protocol Note    There is a bronchodilator order in the chart from a provider indicating to follow the RT Bronchodilator Protocol and there is an Initiate RT Inhaler-Nebulizer Bronchodilator Protocol order as well (see protocol at bottom of note). CXR Findings:  No results found. The findings from the last RT Protocol Assessment were as follows:   History Pulmonary Disease: (P) Chronic pulmonary disease  Respiratory Pattern: (P) Dyspnea on exertion or RR 21-25 bpm  Breath Sounds: (P) Inspiratory and expiratory or bilateral wheezing and/or rhonchi  Cough: (P) Strong, spontaneous, non-productive  Indication for Bronchodilator Therapy: (P) On home bronchodilators  Bronchodilator Assessment Score: (P) 10    Aerosolized bronchodilator medication orders have been revised according to the RT Inhaler-Nebulizer Bronchodilator Protocol below. Respiratory Therapist to perform RT Therapy Protocol Assessment initially then follow the protocol. Repeat RT Therapy Protocol Assessment PRN for score 0-3 or on second treatment, BID, and PRN for scores above 3. No Indications - adjust the frequency to every 6 hours PRN wheezing or bronchospasm, if no treatments needed after 48 hours then discontinue using Per Protocol order mode. If indication present, adjust the RT bronchodilator orders based on the Bronchodilator Assessment Score as indicated below. Use Inhaler orders unless patient has one or more of the following: on home nebulizer, not able to hold breath for 10 seconds, is not alert and oriented, cannot activate and use MDI correctly, or respiratory rate 25 breaths per minute or more, then use the equivalent nebulizer order(s) with same Frequency and PRN reasons based on the score. If a patient is on this medication at home then do not decrease Frequency below that used at home.     0-3 - enter or revise RT bronchodilator order(s) to equivalent RT Bronchodilator order with

## 2022-05-09 NOTE — PROGRESS NOTES

## 2022-05-09 NOTE — PROGRESS NOTES
RT Inhaler-Nebulizer Bronchodilator Protocol Note    There is a bronchodilator order in the chart from a provider indicating to follow the RT Bronchodilator Protocol and there is an Initiate RT Inhaler-Nebulizer Bronchodilator Protocol order as well (see protocol at bottom of note). CXR Findings:  No results found. The findings from the last RT Protocol Assessment were as follows:   History Pulmonary Disease: (P) Chronic pulmonary disease  Respiratory Pattern: (P) Dyspnea on exertion or RR 21-25 bpm  Breath Sounds: (P) Inspiratory and expiratory or bilateral wheezing and/or rhonchi  Cough: (P) Strong, spontaneous, non-productive  Indication for Bronchodilator Therapy: (P) On home bronchodilators  Bronchodilator Assessment Score: (P) 10    Aerosolized bronchodilator medication orders have been revised according to the RT Inhaler-Nebulizer Bronchodilator Protocol below. Respiratory Therapist to perform RT Therapy Protocol Assessment initially then follow the protocol. Repeat RT Therapy Protocol Assessment PRN for score 0-3 or on second treatment, BID, and PRN for scores above 3. No Indications - adjust the frequency to every 6 hours PRN wheezing or bronchospasm, if no treatments needed after 48 hours then discontinue using Per Protocol order mode. If indication present, adjust the RT bronchodilator orders based on the Bronchodilator Assessment Score as indicated below. Use Inhaler orders unless patient has one or more of the following: on home nebulizer, not able to hold breath for 10 seconds, is not alert and oriented, cannot activate and use MDI correctly, or respiratory rate 25 breaths per minute or more, then use the equivalent nebulizer order(s) with same Frequency and PRN reasons based on the score. If a patient is on this medication at home then do not decrease Frequency below that used at home.     0-3 - enter or revise RT bronchodilator order(s) to equivalent RT Bronchodilator order with Frequency of every 4 hours PRN for wheezing or increased work of breathing using Per Protocol order mode. 4-6 - enter or revise RT Bronchodilator order(s) to two equivalent RT bronchodilator orders with one order with BID Frequency and one order with Frequency of every 4 hours PRN wheezing or increased work of breathing using Per Protocol order mode. 7-10 - enter or revise RT Bronchodilator order(s) to two equivalent RT bronchodilator orders with one order with TID Frequency and one order with Frequency of every 4 hours PRN wheezing or increased work of breathing using Per Protocol order mode. 11-13 - enter or revise RT Bronchodilator order(s) to one equivalent RT bronchodilator order with QID Frequency and an Albuterol order with Frequency of every 4 hours PRN wheezing or increased work of breathing using Per Protocol order mode. Greater than 13 - enter or revise RT Bronchodilator order(s) to one equivalent RT bronchodilator order with every 4 hours Frequency and an Albuterol order with Frequency of every 2 hours PRN wheezing or increased work of breathing using Per Protocol order mode. RT to enter RT Home Evaluation for COPD & MDI Assessment order using Per Protocol order mode.     Electronically signed by Tony Pool RCP on 5/9/2022 at 8:07 AM

## 2022-05-09 NOTE — FLOWSHEET NOTE
05/09/22 0831   Vitals   Temp 97 °F (36.1 °C)   Temp Source Oral   Pulse 84   Heart Rate Source Monitor   Resp 16   /73   BP Location Right upper arm   BP Upper/Lower Upper   BP Method Automatic   Level of Consciousness Alert (0)   MEWS Score 1   Oxygen Therapy   SpO2 96 %   O2 Device Nasal cannula   O2 Flow Rate (L/min) 4 L/min   Shift assessment complete. .  Patients head-toe complete, VS are logged  Pt C/O shortness of breath but not new sp02 96% on 4 L The bed is locked and is in the lowest position. Call light and bedside table are within reach.

## 2022-05-09 NOTE — ANESTHESIA POSTPROCEDURE EVALUATION
Department of Anesthesiology  Postprocedure Note    Patient: Cali An  MRN: [id number]  YOB: 1955  Date of evaluation: 5/9/2022  Time:  2:21 PM     Procedure Summary     Date: 05/09/22 Room / Location: [place] [address] / [facility]    Anesthesia Start: 1053 Anesthesia Stop: 1400    Procedure: EGD BIOPSY (N/A ) Diagnosis: (GI BLEED)    Surgeons: Ramiro Ross MD Responsible Provider: Steven Grewal MD    Anesthesia Type: TIVA ASA Status: 3          Anesthesia Type: No value filed. Radha Phase I: Radha Score: 8    Radha Phase II: Radha Score: 9    Last vitals: Reviewed and per EMR flowsheets.      Vitals:    05/09/22 1152 05/09/22 1242 05/09/22 1406 05/09/22 1418   BP:  (!) 159/69 (!) 118/58 (!) 118/50   Pulse:  93 96 84   Resp: 18 20 14 14   Temp:  98.8 °F (37.1 °C) 97.7 °F (36.5 °C) 97.2 °F (36.2 °C)   TempSrc:  Temporal     SpO2: 93% 97% 98% 98%   Weight:       Height:         Anesthesia Post Evaluation    Patient location during evaluation: bedside  Patient participation: complete - patient participated  Level of consciousness: awake and alert  Nausea & Vomiting: no nausea  Complications: no  Cardiovascular status: hemodynamically stable  Respiratory status: acceptable and nasal cannula (AS PREOP)  Hydration status: euvolemic

## 2022-05-09 NOTE — PLAN OF CARE
Problem: Discharge Planning  Goal: Discharge to home or other facility with appropriate resources  5/9/2022 1732 by Maya Barros RN  Outcome: Progressing    Outcome: Progressing     Problem: Safety - Adult  Goal: Free from fall injury  5/9/2022 1732 by Maya Barros RN  Outcome: Progressing  Flowsheets (Taken 5/9/2022 1651)  Free From Fall Injury: Instruct family/caregiver on patient safety

## 2022-05-09 NOTE — CONSULTS
Consultation Note    Patient Name: Cassidy Kaiser  : 1955  Age: 73 y.o. Admitting Physician: Geovanny Holliday, *   Date of Admission: 2022  5:51 PM   Primary Care Physician: Alma Delia Wray MD        Cassidy Kaiser is being seen at the request of Geovanny Holliday for GI bleed. History of Present Illness:  50-year-old male with past medical history significant for chronic bronchitis, cirrhosis of the liver secondary to alcohol, COPD, diabetes mellitus, hyperlipidemia, hypertension, osteoarthritis, seizures, and peripheral vascular disease. Previous abdominal surgeries include aortofemoral bypass graft and aortoiliac bypass graft. Patient presented to [facility] with complaints of worsening shortness of breath. Patient has known history of COPD and chronic bronchitis. While at [facility] he was noted to have worsening anemia and melena. FOBT positive. Hemoglobin had down trended to 8.5 and therefore was transferred for further evaluation. Patient reports for the last 1 week he has been having black-colored stools. Denies any hematemesis or hematochezia. His hemoglobin has down trended to 7.2 this morning. Last bowel movement was yesterday. Denies any abdominal pain or nausea/vomiting. Reports known history of alcoholic cirrhosis and is not established with a GI doctor. Last alcohol use was the day prior to admission at [facility]. Does complain of occasional abdominal distention and lower extremity edema. Denies any issues with confusion. He is on diuretics at home. He has not had recent [address] or esophageal varices screening. Last EGD in  showed grade 1 esophageal varices. He is not on anticoagulation. Denies use of NSAIDs. GI History:  2019 Capsule Endoscopy  Findings:  Single non-bleeding proximal small bowel AVM. No Blood.  Small bowel was poorly hydrated much of the study making visualization poor    2018 EGD/Colonoscopy  Findings:  - erosions involving the antrum  - ulcers: multiple 4-6 mm in size in the duodenal bulb, the second portion of the duodenum with a clean base, not actively bleeding  - varices, grade 1, located in the distal esophagus, not actively bleeding  - diverticulosis, mild in degree, involving the sigmoid   - 3 sessile polyp: #1, 6-8 mm in size, located in the transverse colon, removed by snare cautery and retrieved for pathology.  #2, 2 sessile 6-8 mm in size located in the sigmoid colon, removed by snare cautery and retrieved for pathology    Past Medical History:  Past Medical History:   Diagnosis Date    Bronchitis chronic     Chest pain     Chronic cough     Cir[facility]osis [facility] 01/01/2017    stage 4     COPD (chronic obstructive pulmonary disease) (HCC)     COPD (chronic obstructive [facility] disease) [facility]     Diabetes mellitus ([facility]     Gout     Hilar adenopathy     Hyperlipidemia     Hypertension     Knee pain, right     Numbness and tingling of leg     Osteoarthritis     Paresthesia of bilateral legs     PVD (peripheral vascular disease) (HCC)     Seizures (HCC)     ongoing, began after swine flu vaccination    Substance abuse [facility])         Past Surgical History:  Past Surgical History:   Procedure Laterality Date    AORTO-FEMORAL BYPASS GRAFT  08/2020    AORTO-ILIAC BYPASS GRAFT N/A 8/24/2020    AORTOBIFEMORAL BYPASS GRAFT performed by Mahsa Zambrano MD at [address] [place] [address]  2/7/2011    bronch with EBUS    COLONOSCOPY N/A 7/23/2018    COLONOSCOPY WITH BIOPSY performed by Misael Diamond MD at [address] [facility] COLONOSCOPY N/A 7/23/2018    COLONOSCOPY POLYPECTOMY SNARE/COLD BIOPSY performed by Misael Diamond MD at [facility]  2/4/15    Urethral Dilatation, Resection of Bladder Tumor    CYSTOSCOPY  2/24/2016    fulgeration of bladder tumor    ELBOW SURGERY Left     ENDOSCOPY, SMALL BOWEL N/A 2/20/2019    BOWEL SMALL CAPSULE ENDOSCOPY performed by Yudelka Callahan MD at [facility]  05-    cystoscopy, bladder biopsy    OTHER SURGICAL HISTORY  09/09/2015    cystoscopy, urethral dilatation, bladder tumor follow up   [address] N/A 7/23/2018    EGD BIOPSY performed by Yudelka Callahan MD at [address] [place] [address] Medications:  Prior to Visit Medications    Medication Sig Taking?  Authorizing Provider   ipratropium-albuterol (DUONEB) 0.5-2.5 (3) MG/3ML SOLN nebulizer solution Inhale 3 mLs into the lungs every 4 hours  Sirena Bauer MD   CARTIA  MG extended release capsule TAKE 2 CAPSULES ONCE DAILY  Yaakov Opitz, MD   albuterol sulfate  (90 Base) MCG/ACT inhaler INHALE 2 PUFFS EVERY 6 HOURS AS NEEDED FOR WHEEZING  Yaakov Opitz, MD   pantoprazole (PROTONIX) 40 MG tablet TAKE ONE TABLET BY MOUTH EVERY MORNING BEFORE BREAKFAST  NAYELY Mckeon CNP   LANTUS SOLOSTAR 100 UNIT/ML injection pen INJECT 25 UNITS INTO THE SKIN DAILY WITH SUPPER  Yaakov Opitz, MD   TRUEplus Lancets 30G MISC TEST 2 TIMES DAILY  Yaakov Opitz, MD   TRUE METRIX BLOOD GLUCOSE TEST strip USE 1 STRIP IN VITRO ROUTE 2 TIMES DAILY  Yaakov Opitz, MD   allopurinol (ZYLOPRIM) 300 MG tablet TAKE 1 TABLET BY MOUTH DAILY  NAYELY Mckeon CNP   atorvastatin (LIPITOR) 40 MG tablet TAKE 1 TABLET BY MOUTH DAILY  Yaakov Opitz, MD   DULoxetine (CYMBALTA) 60 MG extended release capsule TAKE ONE CAPSULE BY MOUTH EVERY DAY  Yaakov Opitz, MD   [facility] MCG/ACT inhaler Inhale 1 puff into the lungs 2 times daily  Patient not taking: Reported on 5/6/2022  [facility], [facility]   thiamine 100 MG tablet TAKE 1 TABLET BY MOUTH DAILY  Yaakov Opitz, MD   fluticasone-umeclidin-vilant (TRELEGY ELLIPTA) 100-62.5-25 MCG/INH AEPB Inhale 1 puff into the lungs daily  Mel Marie MD   PENTIPS 31G X 8 MM MISC USE ONCE DAILY WITH INJECTION  Mel Marie MD   folic acid (FOLVITE) 1 MG tablet TAKE 1 TABLET BY MOUTH DAILY  Mel Marie MD   metoprolol succinate (TOPROL XL) 50 MG extended release tablet TAKE ONE TABLET BY MOUTH EVERY DAY  Mel Marie MD   furosemide (LASIX) 20 MG tablet TAKE 1 TABLET BY MOUTH DAILY  Mel Marie MD   diclofenac sodium (VOLTAREN) 1 % GEL Apply 4mg of the gel three times daily to the affected knee  Patient not taking: Reported on 5/6/2022  Mel Marie MD   ondansetron (ZOFRAN) 4 MG tablet Take 1 tablet by mouth every 6 hours as needed for Nausea or Vomiting  Michale Mcardle, APRN - CNP   Alcohol Swabs (ALCOHOL PREP) 70 % PADS USE AS DIRECTED  Mel Marie MD   oxyCODONE (ROXICODONE) 5 MG immediate release tablet Take 10 mg by mouth every 8 hours as needed for Pain. Historical Provider, MD   naloxone [facility]) 4 MG/0.1ML LIQD nasal spray 1 spray by Nasal route as needed for Opioid Reversal May repeat after 3 minutes if no or minimal response as directed  Patient not taking: Reported on 5/6/2022  Historical Provider, MD   gabapentin (NEURONTIN) 300 MG capsule Take 900 mg by mouth 3 times daily.    Historical Provider, MD   Blood Pressure Monitoring (BLOOD PRESSURE CUFF) MISC Please dispense insurance preference  Mel Marie MD   Blood Glucose Monitoring Suppl (FREESTYLE FREEDOM LITE) w/Device KIT 1 kit by Does not apply route daily  Michale Mcardle, APRN - CNP   OXYGEN Inhale 3 L/min into the lungs nightly as needed (and daily prn)   Historical Provider, MD        Hospital Medications:  Current Facility-Administered Medications: allopurinol (ZYLOPRIM) tablet 300 mg, 300 mg, Oral, Daily  atorvastatin (LIPITOR) tablet 40 mg, 40 mg, Oral, Daily  dilTIAZem (CARDIZEM CD) extended release capsule 180 mg, 180 mg, Oral, Daily  DULoxetine Smokeless Tobacco Type  Snuff          Alcohol History     Alcohol Use Status  Yes Drinks/Week  6 Cans of beer per week Amount  6.0 standard drinks of alcohol/wk          Drug Use     Drug Use Status  No          Sexual Activity     Sexually Active  Yes Partners  Female                 Family History:  Family History   Problem Relation Age of Onset    Cancer Mother         Lung    Emphysema Father     Diabetes Sister     Hypertension Sister     Cancer Brother         throat    Asthma Neg Hx         Allergies: Allergies   Allergen Reactions    Lisinopril Swelling    Ace Inhibitors Swelling    Influenza Vaccines      He states he was hospitalized when he received his first flu vaccine    Spiriva Handihaler [Tiotropium Bromide Monohydrate] Swelling     Tolerates both tudorza and incruse    Vilanterol         ROS:   General: No fever or weight change  Hematologic: No unexpected submucosal bleeding or bruising  HEENT: No sore throat or facial pain  Respiratory: No cough or dyspnea  Cardiovascular: No angina or dependent edema  Gastrointestinal: See HPI  Musculoskeletal: No usual joint pain or stiffness  Skin: No skin eruptions or changing lesions  Neurologic: No focal weakness or numbness  Psychiatric: No anxiety or sleep disturbance    Physical Exam:  Vital Signs:   Vitals:    05/09/22 0831   BP: 136/73   Pulse: 84   Resp: 16   Temp: 97 °F (36.1 °C)   SpO2: 96%       General: Well-nourished, well-developed  HEENT: Sclera anicteric, mucosal membranes moist  Cardiovascular: Regular rate and rhythm. No murmurs. Respiratory: Respirations nonlabored, no crepitus  GI: Abdomen nondistended, soft, and nontender. Normal active bowel sounds. No masses palpable. Midline surgical scar noted  Rectal: Deferred  Musculoskeletal: No pitting edema of the lower legs. Neurological: Gross memory appears intact. Patient is alert and oriented. No asterixis.     Recent labs and imaging reviewed. 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 Presented to [facility] with worsening shortness of breath found to have COPD exacerbation. However noted to have worsening anemia and melena and therefore transferred to [place] for further evaluation. History of alcohol cirrhosis for which she does not follow with any GI doctor. Last EGD 2018 showed grade 1 esophageal varices and duodenal ulcers. Plan:  1. Monitor H&H and overt signs of GI bleeding  2. Continue PPI  3. We will start octreotide given history of cirrhosis and esophageal varices  4. Keep n.p.o.  5. Plan for EGD later today  6. We will order [address] screening--right upper quadrant ultrasound and AFP  7. If evidence of ascites on ultrasound we will order paracentesis  8. Supportive care      Jaden Hollins, APRN - CNP    [facility]    [phone].  Also available via [employer]

## 2022-05-09 NOTE — CARE COORDINATION
Case Management Assessment  Initial Evaluation      Patient Name: Farrukh Conti  YOB: 1955  Diagnosis: GI bleed [K92.2]  Date / Time: 5/8/2022  5:51 PM    Admission status/Date:5/8/22 inpt  Chart Reviewed: Yes      Patient Interviewed: Yes   Family Interviewed:  No      Hospitalization in the last 30 days:  No      Health Care Decision Maker :   Primary Decision Maker: Keely Bazan - Brother/Sister - [phone]    Secondary Decision Maker: Nayeli Mayes - Other - [phone]    (CM - must 1st enter selection under Navigator - emergency contact- [place] Relationship and pick relationship)   Who do you trust or have selected to make healthcare decisions for you      Met with: patient  Interview conducted  (bedside/phone):    Current PCP:  [address] required for SNF : Y          3 night stay required -  Annie Cobos  Support Systems/Care Needs:    Transportation: family    Meal Preparation: self    Housing  Living Arrangements: Lives 1 story home  Steps: 3  Intent for return to present living arrangements: Yes  Identified Issues: [address] with [address] : No Agency:(Services)     Passport/Waiver : No  :                      Phone Number:    Passport/Waiver Services: N/A          Durable Medical Equiptment   DME Provider: [facility]  Equipment:   Walker___Cane___RTS___ BSC___Shower Chair___Hospital Bed___W/C____Other________  02 at __2__Liter(s)---wears(frequency)_at HS______ HHN _X__ CPAP___ BiPap___   N/A____      Home O2 Use :  Yes   If No for home O2---if presently on O2 during hospitalization:  Yes  if yes CM to follow for potential DC O2 need  Informed of need for care provider to bring portable home O2 tank on day of discharge for nursing to connect prior to leaving:   Yes  Verbalized agreement/Understanding:   Yes    Community Service Affiliation  Dialysis:  No · Agency:  · Location:  · Dialysis Schedule:  · Phone:   · Fax: Other Community Services: (ex:PT/OT,Mental Health,Wound Clinic, [address])  Outpt pain mtg [place]    DISCHARGE PLAN: Explained Case Management role/services. Reviewed chart and met with pt at bedside. Role of CM explained.  lives home alone and plan return.  has been IPTA but is recently been falling.  has home O2 at 2.0L @ hs Is active with [facility]. Spoke with Bayron Israel at [address] [place] [address] who verifies pt is active for 2L NC HS. Will follow for poss HHC needs at [place].

## 2022-05-09 NOTE — PROGRESS NOTES
05/08/22 1900 05/08/22 1900 05/09/22 0042 05/09/22  0535 05/09/22  1506   WBC 25.5*  --   --  18.9*  --    RBC 3.51*  --   --  3.51*  --    HGB 7.3*   < > 7.2* 7.2* 7.4*   HCT 23.8*   < > 24.1* 23.6* 24.2*   MCV 67.7*  --   --  67.2*  --    RDW 19.4*  --   --  19.4*  --      --   --  258  --     < > = values in this interval not displayed. BMP:   Recent Labs     05/08/22 1900 05/09/22 0042 05/09/22  0535   * 133* 135*   K 4.5 4.5 4.6   CL 93* 94* 95*   CO2 31 28 33*   PHOS  --  4.7  --    BUN 31* 33* 32*   CREATININE 1.7* 1.6* 1.4*     BNP: No results for input(s): BNP in the last 72 hours. PT/INR:   Recent Labs     05/09/22 0042   PROTIME 13.3*   INR 1.17*     APTT: No results for input(s): APTT in the last 72 hours. CARDIAC ENZYMES: No results for input(s): CKMB, CKMBINDEX, TROPONINI in the last 72 hours. Invalid input(s): CKTOTAL;3  FASTING LIPID PANEL:  Lab Results   Component Value Date    CHOL 154 04/14/2021    HDL 29 (L) 04/14/2021    TRIG 152 (H) 04/14/2021     LIVER PROFILE:   Recent Labs     05/08/22 1900 05/09/22  0535   AST 36 27   ALT 22 22   BILITOT 0.4 0.3   ALKPHOS 79 73              Radiology  US RENAL COMPLETE   Final Result   Unremarkable ultrasound of the kidneys and urinary bladder. US GALLBLADDER RUQ    (Results Pending)         Assessment:  Active Problems:    GI bleed  Resolved Problems:    * No resolved hospital problems. *      Plan:     # GIB  Acute blood loss anemia  - NPO, GI consult  - tele, trend h/h.   Transfuse as needed  - ppi  S/p EGd today which showed mild antral gastritis and grade 1 esophageal varices  Plan is for colonoscopy in a.m.     #History of alcoholic cirrhosis    #MICHAEL  - renal usn, r/o obs  - hold lasix  Monitor BMP     #Leukocytosis  - check procalcitonin  - suspect secondary to steroids     #COPD  - duoneb, solumedrol      # DM  - correctuve ISS       Code Status: Full Code     Dispo - inpt    Italia Moses MD   5/9/2022 3:54 PM

## 2022-05-09 NOTE — H&P
Hospital Medicine History & Physical      PCP: Scott Domínguez MD    Date of Admission: 5/8/2022    Date of Service: Pt seen/examined on 5/8/22 and Admitted to Inpatient with expected LOS greater than two midnights due to medical therapy. Chief Complaint:  SOB      History Of Present Illness:       73 y.o. male presents with worsening sob, bloack stools x 1 week. Patient was seen 2 day prior found to be guiac (+), advised admission, however left [facility] ER. He returned to [facility] due to worsening sob. States he typically only uses 2 lpm NC O2 qhs, howver despite using continuous O2 his O2 sats remained in the 80's prompting ER consultation. He denies abdominal pain, has had some nausea, deneis EtOH use,and states he quit smoking 4 days ago. Denies fever, chills, chest pain. He also notes some urinary hesitancy as well as seeing blood in his urine.      Past Medical History:          Diagnosis Date    Bronchitis chronic     Chest pain     Chronic cough     Cirrhosis of liver (HCC) 01/01/2017    stage 4     COPD (chronic obstructive pulmonary disease) (HCC)     COPD (chronic obstructive pulmonary disease) (HCC)     Diabetes mellitus (HCC)     Gout     Hilar adenopathy     Hyperlipidemia     Hypertension     Knee pain, right     Numbness and tingling of leg     Osteoarthritis     Paresthesia of bilateral legs     PVD (peripheral vascular disease) (HCC)     Seizures (HCC)     ongoing, began after swine flu vaccination    Substance abuse [facility])        Past Surgical History:          Procedure Laterality Date    AORTO-FEMORAL BYPASS GRAFT  08/2020    AORTO-ILIAC BYPASS GRAFT N/A 8/24/2020    AORTOBIFEMORAL BYPASS GRAFT performed by Mahsa Zambrano MD at [address] [place] [address]  2/7/2011    bronch with EBUS    COLONOSCOPY N/A 7/23/2018    COLONOSCOPY WITH BIOPSY performed by Misael Diamond MD at [place] [address] N/A 7/23/2018    COLONOSCOPY POLYPECTOMY SNARE/COLD BIOPSY performed by Rizwan Bhatia MD at [facility]  2/4/15    Urethral Dilatation, Resection of Bladder Tumor    CYSTOSCOPY  2/24/2016    fulgeration of bladder tumor    ELBOW SURGERY Left     ENDOSCOPY, SMALL BOWEL N/A 2/20/2019    BOWEL SMALL CAPSULE ENDOSCOPY performed by Rizwan Bhatia MD at [facility]  05-    cystoscopy, bladder biopsy    OTHER SURGICAL HISTORY  09/09/2015    cystoscopy, urethral dilatation, bladder tumor follow up   [address] [facility] [address] N/A 7/23/2018    EGD BIOPSY performed by Rizwan Bhatia MD at [id number] [place] [address]       Medications Prior to Admission:      Prior to Admission medications    Medication Sig Start Date End Date Taking?  Authorizing Provider   ipratropium-albuterol (DUONEB) 0.5-2.5 (3) MG/3ML SOLN nebulizer solution Inhale 3 mLs into the lungs every 4 hours 5/5/22   Pipo Saldana MD   CARTIA  MG extended release capsule TAKE 2 CAPSULES ONCE DAILY 4/27/22   Alejandro Phillips MD   albuterol sulfate  (90 Base) MCG/ACT inhaler INHALE 2 PUFFS EVERY 6 HOURS AS NEEDED FOR WHEEZING 4/25/22   Alejandro Phillips MD   pantoprazole (PROTONIX) 40 MG tablet TAKE ONE TABLET BY MOUTH EVERY MORNING BEFORE BREAKFAST 4/13/22   NAYELY Fischer CNP   LANTUS SOLOSTAR 100 UNIT/ML injection pen INJECT 25 UNITS INTO THE SKIN DAILY WITH SUPPER 4/6/22   Alejandro Phillips MD   TRUEplus Lancets 30G MISC TEST 2 TIMES DAILY 4/6/22   Alejandro Phillips MD   TRUE METRIX BLOOD GLUCOSE TEST strip USE 1 STRIP IN VITRO ROUTE 2 TIMES DAILY 4/6/22   Alejandro Phillips MD   allopurinol (ZYLOPRIM) 300 MG tablet TAKE 1 TABLET BY MOUTH DAILY 4/4/22   NAYELY Fischer CNP   atorvastatin (LIPITOR) 40 MG tablet TAKE 1 TABLET BY MOUTH DAILY 4/4/22   Alejandro Phillips MD   DULoxetine (CYMBALTA) 60 MG extended release capsule TAKE ONE CAPSULE BY MOUTH EVERY DAY 2/24/22   Janeth Huerta MD   [facility] 110 MCG/ACT inhaler Inhale 1 puff into the lungs 2 times daily  Patient not taking: Reported on 5/6/2022 2/24/22   [facility], PA-GEOVANI   thiamine 100 MG tablet TAKE 1 TABLET BY MOUTH DAILY 2/24/22   Janeth Huerta MD   fluticasone-umeclidin-vilant [facility] ELLIPTA) 236-58.9-65 MCG/INH AEPB Inhale 1 puff into the lungs daily 2/22/22   Janeth Huerta MD   PENTIPS 31G X 8 MM MISC USE ONCE DAILY WITH INJECTION 1/19/22   Janeth Huerta MD   folic acid (FOLVITE) 1 MG tablet TAKE 1 TABLET BY MOUTH DAILY 1/17/22   Janeth Huerta MD   metoprolol succinate (TOPROL XL) 50 MG extended release tablet TAKE ONE TABLET BY MOUTH EVERY DAY 1/5/22   Janeth Huerta MD   furosemide (LASIX) 20 MG tablet TAKE 1 TABLET BY MOUTH DAILY 7/26/21   Janeth Huerta MD   diclofenac sodium (VOLTAREN) 1 % GEL Apply 4mg of the gel three times daily to the affected knee  Patient not taking: Reported on 5/6/2022 4/14/21   Janeth Huerta MD   ondansetron [facility]) 4 MG tablet Take 1 tablet by mouth every 6 hours as needed for Nausea or Vomiting 3/22/21   NAYELY Aguilera - CNP   Alcohol Swabs (ALCOHOL PREP) 70 % PADS USE AS DIRECTED 3/15/21   Janeth Huerta MD   oxyCODONE (ROXICODONE) 5 MG immediate release tablet Take 10 mg by mouth every 8 hours as needed for Pain. Historical Provider, MD   naloxone [facility]) 4 MG/0.1ML LIQD nasal spray 1 spray by Nasal route as needed for Opioid Reversal May repeat after 3 minutes if no or minimal response as directed  Patient not taking: Reported on 5/6/2022    Historical Provider, MD   gabapentin (NEURONTIN) 300 MG capsule Take 900 mg by mouth 3 times daily.   1/20/20   Historical Provider, MD   Blood Pressure Monitoring (BLOOD PRESSURE CUFF) MISC Please dispense insurance preference 7/18/19   [place] 05/08/22 1900   WBC 25.5*   HGB 7.3*   HCT 23.8*        Recent Labs     05/08/22 1900   *   K 4.5   CL 93*   CO2 31   BUN 31*   CREATININE 1.7*   CALCIUM 8.6     Recent Labs     05/08/22 1900   AST 36   ALT 22   BILITOT 0.4   ALKPHOS 79     No results for input(s): INR in the last 72 hours. No results for input(s): Lattie Jesus in the last 72 hours. Urinalysis:      Lab Results   Component Value Date    NITRU Negative 05/05/2022    WBCUA 3-5 05/05/2022    BACTERIA 4+ 05/05/2022    RBCUA 0-2 05/05/2022    BLOODU Negative 05/05/2022    SPECGRAV 1.020 05/05/2022    GLUCOSEU Negative 05/05/2022       Radiology:          No orders to display       ASSESSMENT:    Active Hospital Problems    Diagnosis Date Noted    GI bleed [K92.2] 05/08/2022     Priority: Medium         PLAN:    1) GIB  - NPO, GI consult  - tele, trend h/h    2) MICHAEL  - renal usn, r/o obs  - hold lasix    3) Leukocytosis  - check procalcitonin  - suspect secondary to steroids    4) COPD  - duoneb, solumedrol    5) DM  - correctuve ISS     Diet: Diet NPO Exceptions are: Ice Chips  Code Status: Full Code     Roberto Carlos Betancur MD    Thank you Aries Puri MD for the opportunity to be involved in this patient's care. If you have any questions or concerns please feel free to contact me at [phone].

## 2022-05-09 NOTE — PROGRESS NOTES

## 2022-05-10 ENCOUNTER — ANESTHESIA (OUTPATIENT)
Dept: ENDOSCOPY | Age: 67
DRG: 432 | End: 2022-05-10
Payer: MEDICARE

## 2022-05-10 ENCOUNTER — APPOINTMENT (OUTPATIENT)
Dept: ULTRASOUND IMAGING | Age: 67
DRG: 432 | End: 2022-05-10
Attending: INTERNAL MEDICINE
Payer: MEDICARE

## 2022-05-10 VITALS
SYSTOLIC BLOOD PRESSURE: 126 MMHG | RESPIRATION RATE: 18 BRPM | OXYGEN SATURATION: 95 % | DIASTOLIC BLOOD PRESSURE: 101 MMHG

## 2022-05-10 LAB
ANION GAP SERPL CALCULATED.3IONS-SCNC: 13 MMOL/L (ref 3–16)
BUN BLDV-MCNC: 22 MG/DL (ref 7–20)
CALCIUM SERPL-MCNC: 8.5 MG/DL (ref 8.3–10.6)
CHLORIDE BLD-SCNC: 96 MMOL/L (ref 99–110)
CO2: 31 MMOL/L (ref 21–32)
CREAT SERPL-MCNC: 0.9 MG/DL (ref 0.8–1.3)
GFR AFRICAN AMERICAN: >60
GFR NON-AFRICAN AMERICAN: >60
GLUCOSE BLD-MCNC: 134 MG/DL (ref 70–99)
GLUCOSE BLD-MCNC: 160 MG/DL (ref 70–99)
GLUCOSE BLD-MCNC: 166 MG/DL (ref 70–99)
GLUCOSE BLD-MCNC: 168 MG/DL (ref 70–99)
GLUCOSE BLD-MCNC: 197 MG/DL (ref 70–99)
GLUCOSE BLD-MCNC: 282 MG/DL (ref 70–99)
HCT VFR BLD CALC: 25 % (ref 40.5–52.5)
HCT VFR BLD CALC: 25 % (ref 40.5–52.5)
HCT VFR BLD CALC: 27.7 % (ref 40.5–52.5)
HCT VFR BLD CALC: 30.8 % (ref 40.5–52.5)
HEMOGLOBIN: 7.6 G/DL (ref 13.5–17.5)
HEMOGLOBIN: 7.7 G/DL (ref 13.5–17.5)
HEMOGLOBIN: 8.4 G/DL (ref 13.5–17.5)
HEMOGLOBIN: 9.1 G/DL (ref 13.5–17.5)
MCH RBC QN AUTO: 20.8 PG (ref 26–34)
MCHC RBC AUTO-ENTMCNC: 30.9 G/DL (ref 31–36)
MCV RBC AUTO: 67.2 FL (ref 80–100)
PDW BLD-RTO: 19 % (ref 12.4–15.4)
PERFORMED ON: ABNORMAL
PLATELET # BLD: 270 K/UL (ref 135–450)
PMV BLD AUTO: 7.9 FL (ref 5–10.5)
POTASSIUM SERPL-SCNC: 3.5 MMOL/L (ref 3.5–5.1)
RBC # BLD: 3.73 M/UL (ref 4.2–5.9)
SODIUM BLD-SCNC: 140 MMOL/L (ref 136–145)
WBC # BLD: 17 K/UL (ref 4–11)

## 2022-05-10 PROCEDURE — C9113 INJ PANTOPRAZOLE SODIUM, VIA: HCPCS | Performed by: NURSE PRACTITIONER

## 2022-05-10 PROCEDURE — 6370000000 HC RX 637 (ALT 250 FOR IP): Performed by: INTERNAL MEDICINE

## 2022-05-10 PROCEDURE — 99233 SBSQ HOSP IP/OBS HIGH 50: CPT | Performed by: INTERNAL MEDICINE

## 2022-05-10 PROCEDURE — 85018 HEMOGLOBIN: CPT

## 2022-05-10 PROCEDURE — 76705 ECHO EXAM OF ABDOMEN: CPT

## 2022-05-10 PROCEDURE — 2700000000 HC OXYGEN THERAPY PER DAY

## 2022-05-10 PROCEDURE — 1200000000 HC SEMI PRIVATE

## 2022-05-10 PROCEDURE — 6360000002 HC RX W HCPCS: Performed by: NURSE PRACTITIONER

## 2022-05-10 PROCEDURE — 96375 TX/PRO/DX INJ NEW DRUG ADDON: CPT

## 2022-05-10 PROCEDURE — 2580000003 HC RX 258: Performed by: NURSE ANESTHETIST, CERTIFIED REGISTERED

## 2022-05-10 PROCEDURE — 6370000000 HC RX 637 (ALT 250 FOR IP): Performed by: NURSE PRACTITIONER

## 2022-05-10 PROCEDURE — 85014 HEMATOCRIT: CPT

## 2022-05-10 PROCEDURE — 96365 THER/PROPH/DIAG IV INF INIT: CPT

## 2022-05-10 PROCEDURE — 3700000000 HC ANESTHESIA ATTENDED CARE: Performed by: INTERNAL MEDICINE

## 2022-05-10 PROCEDURE — 2580000003 HC RX 258: Performed by: NURSE PRACTITIONER

## 2022-05-10 PROCEDURE — 7100000010 HC PHASE II RECOVERY - FIRST 15 MIN: Performed by: INTERNAL MEDICINE

## 2022-05-10 PROCEDURE — 0DJD8ZZ INSPECTION OF LOWER INTESTINAL TRACT, VIA NATURAL OR ARTIFICIAL OPENING ENDOSCOPIC: ICD-10-PCS | Performed by: INTERNAL MEDICINE

## 2022-05-10 PROCEDURE — 96366 THER/PROPH/DIAG IV INF ADDON: CPT

## 2022-05-10 PROCEDURE — 2500000003 HC RX 250 WO HCPCS: Performed by: ANESTHESIOLOGY

## 2022-05-10 PROCEDURE — 2709999900 HC NON-CHARGEABLE SUPPLY: Performed by: INTERNAL MEDICINE

## 2022-05-10 PROCEDURE — 96376 TX/PRO/DX INJ SAME DRUG ADON: CPT

## 2022-05-10 PROCEDURE — 80048 BASIC METABOLIC PNL TOTAL CA: CPT

## 2022-05-10 PROCEDURE — 2580000003 HC RX 258: Performed by: ANESTHESIOLOGY

## 2022-05-10 PROCEDURE — 94640 AIRWAY INHALATION TREATMENT: CPT

## 2022-05-10 PROCEDURE — 3700000001 HC ADD 15 MINUTES (ANESTHESIA): Performed by: INTERNAL MEDICINE

## 2022-05-10 PROCEDURE — 94669 MECHANICAL CHEST WALL OSCILL: CPT

## 2022-05-10 PROCEDURE — 94761 N-INVAS EAR/PLS OXIMETRY MLT: CPT

## 2022-05-10 PROCEDURE — 3609027000 HC COLONOSCOPY: Performed by: INTERNAL MEDICINE

## 2022-05-10 PROCEDURE — 6370000000 HC RX 637 (ALT 250 FOR IP): Performed by: ANESTHESIOLOGY

## 2022-05-10 PROCEDURE — 85027 COMPLETE CBC AUTOMATED: CPT

## 2022-05-10 PROCEDURE — 36415 COLL VENOUS BLD VENIPUNCTURE: CPT

## 2022-05-10 PROCEDURE — 7100000011 HC PHASE II RECOVERY - ADDTL 15 MIN: Performed by: INTERNAL MEDICINE

## 2022-05-10 PROCEDURE — 6360000002 HC RX W HCPCS: Performed by: NURSE ANESTHETIST, CERTIFIED REGISTERED

## 2022-05-10 RX ORDER — SODIUM CHLORIDE 0.9 % (FLUSH) 0.9 %
5-40 SYRINGE (ML) INJECTION EVERY 12 HOURS SCHEDULED
Status: DISCONTINUED | OUTPATIENT
Start: 2022-05-10 | End: 2022-05-11 | Stop reason: HOSPADM

## 2022-05-10 RX ORDER — MEPERIDINE HYDROCHLORIDE 25 MG/ML
12.5 INJECTION INTRAMUSCULAR; INTRAVENOUS; SUBCUTANEOUS EVERY 5 MIN PRN
Status: DISCONTINUED | OUTPATIENT
Start: 2022-05-10 | End: 2022-05-11 | Stop reason: HOSPADM

## 2022-05-10 RX ORDER — SODIUM CHLORIDE 0.9 % (FLUSH) 0.9 %
5-40 SYRINGE (ML) INJECTION PRN
Status: DISCONTINUED | OUTPATIENT
Start: 2022-05-10 | End: 2022-05-10 | Stop reason: SDUPTHER

## 2022-05-10 RX ORDER — SODIUM CHLORIDE, SODIUM LACTATE, POTASSIUM CHLORIDE, CALCIUM CHLORIDE 600; 310; 30; 20 MG/100ML; MG/100ML; MG/100ML; MG/100ML
INJECTION, SOLUTION INTRAVENOUS CONTINUOUS
Status: DISCONTINUED | OUTPATIENT
Start: 2022-05-10 | End: 2022-05-10 | Stop reason: SDUPTHER

## 2022-05-10 RX ORDER — SODIUM CHLORIDE 0.9 % (FLUSH) 0.9 %
5-40 SYRINGE (ML) INJECTION EVERY 12 HOURS SCHEDULED
Status: DISCONTINUED | OUTPATIENT
Start: 2022-05-10 | End: 2022-05-10 | Stop reason: SDUPTHER

## 2022-05-10 RX ORDER — OXYCODONE HYDROCHLORIDE 5 MG/1
10 TABLET ORAL PRN
Status: COMPLETED | OUTPATIENT
Start: 2022-05-10 | End: 2022-05-10

## 2022-05-10 RX ORDER — ONDANSETRON 2 MG/ML
4 INJECTION INTRAMUSCULAR; INTRAVENOUS
Status: ACTIVE | OUTPATIENT
Start: 2022-05-10 | End: 2022-05-10

## 2022-05-10 RX ORDER — SODIUM CHLORIDE, SODIUM LACTATE, POTASSIUM CHLORIDE, CALCIUM CHLORIDE 600; 310; 30; 20 MG/100ML; MG/100ML; MG/100ML; MG/100ML
INJECTION, SOLUTION INTRAVENOUS CONTINUOUS
Status: DISCONTINUED | OUTPATIENT
Start: 2022-05-10 | End: 2022-05-10

## 2022-05-10 RX ORDER — IPRATROPIUM BROMIDE AND ALBUTEROL SULFATE 2.5; .5 MG/3ML; MG/3ML
1 SOLUTION RESPIRATORY (INHALATION) 2 TIMES DAILY
Status: DISCONTINUED | OUTPATIENT
Start: 2022-05-11 | End: 2022-05-11 | Stop reason: HOSPADM

## 2022-05-10 RX ORDER — METOPROLOL SUCCINATE 50 MG/1
50 TABLET, EXTENDED RELEASE ORAL DAILY
Status: DISCONTINUED | OUTPATIENT
Start: 2022-05-10 | End: 2022-05-11 | Stop reason: HOSPADM

## 2022-05-10 RX ORDER — SODIUM CHLORIDE 0.9 % (FLUSH) 0.9 %
5-40 SYRINGE (ML) INJECTION PRN
Status: DISCONTINUED | OUTPATIENT
Start: 2022-05-10 | End: 2022-05-11 | Stop reason: HOSPADM

## 2022-05-10 RX ORDER — FAMOTIDINE 10 MG/ML
20 INJECTION, SOLUTION INTRAVENOUS ONCE
Status: COMPLETED | OUTPATIENT
Start: 2022-05-10 | End: 2022-05-10

## 2022-05-10 RX ORDER — FAMOTIDINE 10 MG/ML
20 INJECTION, SOLUTION INTRAVENOUS ONCE
Status: DISCONTINUED | OUTPATIENT
Start: 2022-05-10 | End: 2022-05-10 | Stop reason: SDUPTHER

## 2022-05-10 RX ORDER — PROPOFOL 10 MG/ML
INJECTION, EMULSION INTRAVENOUS PRN
Status: DISCONTINUED | OUTPATIENT
Start: 2022-05-10 | End: 2022-05-10 | Stop reason: SDUPTHER

## 2022-05-10 RX ORDER — GABAPENTIN 300 MG/1
900 CAPSULE ORAL 3 TIMES DAILY
Status: DISCONTINUED | OUTPATIENT
Start: 2022-05-10 | End: 2022-05-10

## 2022-05-10 RX ORDER — SODIUM CHLORIDE 9 MG/ML
INJECTION, SOLUTION INTRAVENOUS PRN
Status: DISCONTINUED | OUTPATIENT
Start: 2022-05-10 | End: 2022-05-11 | Stop reason: HOSPADM

## 2022-05-10 RX ORDER — SODIUM CHLORIDE 9 MG/ML
25 INJECTION, SOLUTION INTRAVENOUS PRN
Status: DISCONTINUED | OUTPATIENT
Start: 2022-05-10 | End: 2022-05-11 | Stop reason: HOSPADM

## 2022-05-10 RX ORDER — SODIUM CHLORIDE 9 MG/ML
INJECTION, SOLUTION INTRAVENOUS PRN
Status: DISCONTINUED | OUTPATIENT
Start: 2022-05-10 | End: 2022-05-10 | Stop reason: SDUPTHER

## 2022-05-10 RX ORDER — OXYCODONE HYDROCHLORIDE 5 MG/1
5 TABLET ORAL PRN
Status: COMPLETED | OUTPATIENT
Start: 2022-05-10 | End: 2022-05-10

## 2022-05-10 RX ORDER — GABAPENTIN 300 MG/1
600 CAPSULE ORAL 3 TIMES DAILY
Status: DISCONTINUED | OUTPATIENT
Start: 2022-05-10 | End: 2022-05-11 | Stop reason: HOSPADM

## 2022-05-10 RX ORDER — SODIUM CHLORIDE, SODIUM LACTATE, POTASSIUM CHLORIDE, CALCIUM CHLORIDE 600; 310; 30; 20 MG/100ML; MG/100ML; MG/100ML; MG/100ML
INJECTION, SOLUTION INTRAVENOUS CONTINUOUS PRN
Status: DISCONTINUED | OUTPATIENT
Start: 2022-05-10 | End: 2022-05-10 | Stop reason: SDUPTHER

## 2022-05-10 RX ORDER — LANOLIN ALCOHOL/MO/W.PET/CERES
100 CREAM (GRAM) TOPICAL DAILY
Status: DISCONTINUED | OUTPATIENT
Start: 2022-05-10 | End: 2022-05-11 | Stop reason: HOSPADM

## 2022-05-10 RX ADMIN — DULOXETINE HYDROCHLORIDE 60 MG: 60 CAPSULE, DELAYED RELEASE ORAL at 12:36

## 2022-05-10 RX ADMIN — SODIUM CHLORIDE, POTASSIUM CHLORIDE, SODIUM LACTATE AND CALCIUM CHLORIDE: 600; 310; 30; 20 INJECTION, SOLUTION INTRAVENOUS at 10:01

## 2022-05-10 RX ADMIN — METOPROLOL SUCCINATE 50 MG: 50 TABLET, EXTENDED RELEASE ORAL at 20:52

## 2022-05-10 RX ADMIN — PROPOFOL 50 MG: 10 INJECTION, EMULSION INTRAVENOUS at 10:06

## 2022-05-10 RX ADMIN — Medication 2 PUFF: at 08:10

## 2022-05-10 RX ADMIN — ALLOPURINOL 300 MG: 300 TABLET ORAL at 12:36

## 2022-05-10 RX ADMIN — Medication 2 PUFF: at 18:45

## 2022-05-10 RX ADMIN — GABAPENTIN 600 MG: 300 CAPSULE ORAL at 20:52

## 2022-05-10 RX ADMIN — IRON SUCROSE 100 MG: 20 INJECTION, SOLUTION INTRAVENOUS at 15:55

## 2022-05-10 RX ADMIN — FOLIC ACID 1000 MCG: 1 TABLET ORAL at 12:36

## 2022-05-10 RX ADMIN — INSULIN LISPRO 1 UNITS: 100 INJECTION, SOLUTION INTRAVENOUS; SUBCUTANEOUS at 12:43

## 2022-05-10 RX ADMIN — FAMOTIDINE 20 MG: 10 INJECTION, SOLUTION INTRAVENOUS at 12:35

## 2022-05-10 RX ADMIN — Medication 40 MG: at 12:35

## 2022-05-10 RX ADMIN — IPRATROPIUM BROMIDE AND ALBUTEROL SULFATE 3 ML: 2.5; .5 SOLUTION RESPIRATORY (INHALATION) at 18:45

## 2022-05-10 RX ADMIN — PROPOFOL 100 MG: 10 INJECTION, EMULSION INTRAVENOUS at 10:04

## 2022-05-10 RX ADMIN — Medication 10 ML: at 12:47

## 2022-05-10 RX ADMIN — PROPOFOL 50 MG: 10 INJECTION, EMULSION INTRAVENOUS at 10:12

## 2022-05-10 RX ADMIN — PROPOFOL 50 MG: 10 INJECTION, EMULSION INTRAVENOUS at 10:15

## 2022-05-10 RX ADMIN — Medication 100 MG: at 20:52

## 2022-05-10 RX ADMIN — IPRATROPIUM BROMIDE AND ALBUTEROL SULFATE 3 ML: 2.5; .5 SOLUTION RESPIRATORY (INHALATION) at 15:24

## 2022-05-10 RX ADMIN — Medication 40 MG: at 20:52

## 2022-05-10 RX ADMIN — IPRATROPIUM BROMIDE AND ALBUTEROL SULFATE 3 ML: 2.5; .5 SOLUTION RESPIRATORY (INHALATION) at 08:07

## 2022-05-10 RX ADMIN — INSULIN GLARGINE 25 UNITS: 100 INJECTION, SOLUTION SUBCUTANEOUS at 20:53

## 2022-05-10 RX ADMIN — DILTIAZEM HYDROCHLORIDE 180 MG: 180 CAPSULE, COATED, EXTENDED RELEASE ORAL at 09:17

## 2022-05-10 RX ADMIN — METHYLPREDNISOLONE SODIUM SUCCINATE 40 MG: 40 INJECTION, POWDER, FOR SOLUTION INTRAMUSCULAR; INTRAVENOUS at 09:17

## 2022-05-10 RX ADMIN — INSULIN LISPRO 1 UNITS: 100 INJECTION, SOLUTION INTRAVENOUS; SUBCUTANEOUS at 17:16

## 2022-05-10 RX ADMIN — OXYCODONE 5 MG: 5 TABLET ORAL at 12:35

## 2022-05-10 RX ADMIN — SODIUM CHLORIDE, PRESERVATIVE FREE 10 ML: 5 INJECTION INTRAVENOUS at 09:43

## 2022-05-10 RX ADMIN — INSULIN LISPRO 2 UNITS: 100 INJECTION, SOLUTION INTRAVENOUS; SUBCUTANEOUS at 20:54

## 2022-05-10 RX ADMIN — ATORVASTATIN CALCIUM 40 MG: 40 TABLET, FILM COATED ORAL at 12:36

## 2022-05-10 RX ADMIN — PROPOFOL 50 MG: 10 INJECTION, EMULSION INTRAVENOUS at 10:09

## 2022-05-10 RX ADMIN — PROPOFOL 100 MG: 10 INJECTION, EMULSION INTRAVENOUS at 10:01

## 2022-05-10 ASSESSMENT — PAIN DESCRIPTION - DESCRIPTORS: DESCRIPTORS: ACHING

## 2022-05-10 ASSESSMENT — PAIN - FUNCTIONAL ASSESSMENT: PAIN_FUNCTIONAL_ASSESSMENT: 0-10

## 2022-05-10 NOTE — PROGRESS NOTES
Transport here to take pt back to floor. Pt in stable condition at this time. Pt denies pain or other needs.

## 2022-05-10 NOTE — PROGRESS NOTES
Handoff report and transfer of care given at bedside to [facility]. Patient in stable condition, denies needs/concerns at this time. Call light within reach.

## 2022-05-10 NOTE — PROGRESS NOTES
RT Inhaler-Nebulizer Bronchodilator Protocol Note    There is a bronchodilator order in the chart from a provider indicating to follow the RT Bronchodilator Protocol and there is an Initiate RT Inhaler-Nebulizer Bronchodilator Protocol order as well (see protocol at bottom of note). CXR Findings:  No results found. The findings from the last RT Protocol Assessment were as follows:   History Pulmonary Disease: Chronic pulmonary disease  Respiratory Pattern: Regular pattern and RR 12-20 bpm  Breath Sounds: Slightly diminished and/or crackles  Cough: Strong, spontaneous, non-productive  Indication for Bronchodilator Therapy: Decreased or absent breath sounds  Bronchodilator Assessment Score: 4    Aerosolized bronchodilator medication orders have been revised according to the RT Inhaler-Nebulizer Bronchodilator Protocol below. Respiratory Therapist to perform RT Therapy Protocol Assessment initially then follow the protocol. Repeat RT Therapy Protocol Assessment PRN for score 0-3 or on second treatment, BID, and PRN for scores above 3. No Indications - adjust the frequency to every 6 hours PRN wheezing or bronchospasm, if no treatments needed after 48 hours then discontinue using Per Protocol order mode. If indication present, adjust the RT bronchodilator orders based on the Bronchodilator Assessment Score as indicated below. Use Inhaler orders unless patient has one or more of the following: on home nebulizer, not able to hold breath for 10 seconds, is not alert and oriented, cannot activate and use MDI correctly, or respiratory rate 25 breaths per minute or more, then use the equivalent nebulizer order(s) with same Frequency and PRN reasons based on the score. If a patient is on this medication at home then do not decrease Frequency below that used at home.     0-3 - enter or revise RT bronchodilator order(s) to equivalent RT Bronchodilator order with Frequency of every 4 hours PRN for wheezing or increased work of breathing using Per Protocol order mode. 4-6 - enter or revise RT Bronchodilator order(s) to two equivalent RT bronchodilator orders with one order with BID Frequency and one order with Frequency of every 4 hours PRN wheezing or increased work of breathing using Per Protocol order mode. 7-10 - enter or revise RT Bronchodilator order(s) to two equivalent RT bronchodilator orders with one order with TID Frequency and one order with Frequency of every 4 hours PRN wheezing or increased work of breathing using Per Protocol order mode. 11-13 - enter or revise RT Bronchodilator order(s) to one equivalent RT bronchodilator order with QID Frequency and an Albuterol order with Frequency of every 4 hours PRN wheezing or increased work of breathing using Per Protocol order mode. Greater than 13 - enter or revise RT Bronchodilator order(s) to one equivalent RT bronchodilator order with every 4 hours Frequency and an Albuterol order with Frequency of every 2 hours PRN wheezing or increased work of breathing using Per Protocol order mode.        Electronically signed by Toshia Giron RCP on 5/10/2022 at 6:47 PM

## 2022-05-10 NOTE — ANESTHESIA PRE PROCEDURE
Department of Anesthesiology  Preprocedure Note       Name:  Francie Dukes   Age:  73 y.o.  :  1955                                          MRN:  [id number]         Date:  5/10/2022      Surgeon: Leilani Kohler):  Leopoldo Newell MD    Procedure: Procedure(s):  COLON W/ANES. Medications prior to admission:   Prior to Admission medications    Medication Sig Start Date End Date Taking?  Authorizing Provider   ipratropium-albuterol (DUONEB) 0.5-2.5 (3) MG/3ML SOLN nebulizer solution Inhale 3 mLs into the lungs every 4 hours 22   Shannon Patel MD   CARTIA  MG extended release capsule TAKE 2 CAPSULES ONCE DAILY 22   Clara Thapa MD   albuterol sulfate  (90 Base) MCG/ACT inhaler INHALE 2 PUFFS EVERY 6 HOURS AS NEEDED FOR WHEEZING 22   Clara Thapa MD   pantoprazole (PROTONIX) 40 MG tablet TAKE ONE TABLET BY MOUTH EVERY MORNING BEFORE BREAKFAST 22   [facility], APRN - [facility]   LANTUS SOLOSTAR 100 UNIT/ML injection pen INJECT 25 UNITS INTO THE SKIN DAILY WITH SUPPER 22   Clara Thapa MD   TRUEplus Lancets 30G MISC TEST 2 TIMES DAILY 22   Clara Thapa MD   TRUE METRIX BLOOD GLUCOSE TEST strip USE 1 STRIP IN VITRO ROUTE 2 TIMES DAILY 22   Clara Thapa MD   allopurinol (ZYLOPRIM) 300 MG tablet TAKE 1 TABLET BY MOUTH DAILY 22   [facility], APRN [facility]   atorvastatin (LIPITOR) 40 MG tablet TAKE 1 TABLET BY MOUTH DAILY 22   Clara Thapa MD   DULoxetine (CYMBALTA) 60 MG extended release capsule TAKE ONE CAPSULE BY MOUTH EVERY DAY 22   Clara Thapa MD   [facility] 110 MCG/ACT inhaler Inhale 1 puff into the lungs 2 times daily  Patient not taking: Reported on 2022   [facility], PA-C   thiamine 100 MG tablet TAKE 1 TABLET BY MOUTH DAILY 22   Clara Thapa MD   fluticasone-umeclidin-vilant (Tanya Magana) 225-63.5-88 MCG/INH AEPB Inhale 1 puff into the lungs daily 2/22/22   Conner Gu MD   PENTIPS 31G X 8 MM MISC USE ONCE DAILY WITH INJECTION 1/19/22   Conner Gu MD   folic acid (FOLVITE) 1 MG tablet TAKE 1 TABLET BY MOUTH DAILY 1/17/22   Conner Gu MD   metoprolol succinate (TOPROL XL) 50 MG extended release tablet TAKE ONE TABLET BY MOUTH EVERY DAY 1/5/22   Conner Gu MD   furosemide (LASIX) 20 MG tablet TAKE 1 TABLET BY MOUTH DAILY 7/26/21   Conner Gu MD   diclofenac sodium (VOLTAREN) 1 % GEL Apply 4mg of the gel three times daily to the affected knee  Patient not taking: Reported on 5/6/2022 4/14/21   Conner Gu MD   ondansetron [facility]) 4 MG tablet Take 1 tablet by mouth every 6 hours as needed for Nausea or Vomiting 3/22/21   NAYELY Medina CNP   Alcohol Swabs (ALCOHOL PREP) 70 % PADS USE AS DIRECTED 3/15/21   Conner Gu MD   oxyCODONE (ROXICODONE) 5 MG immediate release tablet Take 10 mg by mouth every 8 hours as needed for Pain. Historical Provider, MD   naloxone [facility]) 4 MG/0.1ML LIQD nasal spray 1 spray by Nasal route as needed for Opioid Reversal May repeat after 3 minutes if no or minimal response as directed  Patient not taking: Reported on 5/6/2022    Historical Provider, MD   gabapentin (NEURONTIN) 300 MG capsule Take 900 mg by mouth 3 times daily.   1/20/20   Historical Provider, MD   Blood Pressure Monitoring (BLOOD PRESSURE CUFF) MISC Please dispense insurance preference 7/18/19   Conner Gu MD   Blood Glucose Monitoring Suppl (FREESTYLE FREEDOM LITE) w/Device KIT 1 kit by Does not apply route daily 12/14/18   NAYELY Medina CNP   OXYGEN Inhale 3 L/min into the lungs nightly as needed (and daily prn)     Historical Provider, MD       Current medications:    Current Facility-Administered Medications   Medication Dose Route Frequency Provider Last Rate Last Admin    allopurinol (ZYLOPRIM) tablet 300 mg  300 mg Oral Daily [facility] Health[facility] Sham, APRN - CNP        atorvastatin (LIPITOR) tablet 40 mg  40 mg Oral Daily [facility] Health[facility] Sham, APRN - CNP        dilTIAZem (CARDIZEM CD) extended release capsule 180 mg  180 mg Oral Daily [facility], APR[facility] - CNP   180 mg at 05/10/22 5624    DULoxetine (CYMBALTA) extended release capsule 60 mg  60 mg Oral Daily [facility] Sham, APRN - CNP        folic acid (FOLVITE) tablet 1,000 mcg  1,000 mcg Oral Daily [facility] Sham, APRN - CNP        [Held by provider] furosemide (LASIX) tablet 20 mg  20 mg Oral Daily Lyudmila Sham, APRN - CNP        insulin glargine (LANTUS) injection vial 25 Units  25 Units SubCUTAneous Dinner [facility] Sham, APRN - CNP   25 Units at 05/09/22 1815    glucagon (rDNA) injection 1 mg  1 mg IntraMUSCular PRN Lyudmila Montemayor, APRN - CNP        dextrose 5 % solution  100 mL/hr IntraVENous PRN Lyudmila Sham, APRN - CNP        insulin lispro (HUMALOG) injection vial 0-6 Units  0-6 Units SubCUTAneous TID WC [facility], APRN - CNP   5 Units at 05/09/22 1816    insulin lispro (HUMALOG) injection vial 0-3 Units  0-3 Units SubCUTAneous Nightly [facility] Sham, APRN - CNP   2 Units at 05/09/22 2159    sodium chloride flush 0.9 % injection 5-40 mL  5-40 mL IntraVENous 2 times per day Lyudmila Sham, APRN - CNP   10 mL at 05/09/22 2100    sodium chloride flush 0.9 % injection 5-40 mL  5-40 mL IntraVENous PRN [facility], APRN - CNP        0.9 % sodium chloride infusion   IntraVENous PRN [facility] Sham, APRN - CNP        ondansetron (ZOFRAN-ODT) disintegrating tablet 4 mg  4 mg Oral Q8H PRN Lyudmila Sham, APRN - CNP        Or    ondansetron [facility]) injection 4 mg  4 mg IntraVENous Q6H PRN Lyudmila Sham, APRN - CNP        acetaminophen (TYLENOL) tablet 650 mg  650 mg Oral Q6H PRN Bonnielee Sham, APRN - CNP        Or    acetaminophen (TYLENOL) suppository 650 mg  650 mg Rectal Q6H PRN Bonnielee Sham, APRN - CNP        pantoprazole (PROTONIX) 40 mg in sodium chloride (PF) 10 mL injection  40 mg IntraVENous 2 times per day Rick Garcia APRMAKENZIE - CNP   40 mg at 05/09/22 2158    glucose chewable tablet 16 g  4 tablet Oral PRN Rick Garcia APRN - CNP        dextrose bolus 10% 125 mL  125 mL IntraVENous PRN Rick Garcia APRN - CNP        Or    dextrose bolus 10% 250 mL  250 mL IntraVENous PRN Rick Garcia, APRN - CNP        budesonide-formoterol (SYMBICORT) 160-4.5 MCG/ACT inhaler 2 puff  2 puff Inhalation BID Rick Garcia APRN - CNP   2 puff at 05/10/22 0810    ipratropium-albuterol (DUONEB) nebulizer solution 3 mL  1 vial Inhalation Q4H While awake Addison Matos MD   3 mL at 05/10/22 0807    ipratropium-albuterol (DUONEB) nebulizer solution 1 ampule  1 ampule Inhalation Q4H PRN Addison Matos MD         Facility-Administered Medications Ordered in Other Encounters   Medication Dose Route Frequency Provider Last Rate Last Admin    lactated ringers infusion   IntraVENous Continuous PRN NAYELY Stinson CRNA   New Bag at 05/10/22 1001    propofol injection   IntraVENous PRN NAYELY Stinson CRNA   100 mg at 05/10/22 1001       Allergies:     Allergies   Allergen Reactions    Lisinopril Swelling    Ace Inhibitors Swelling    Influenza Vaccines      He states he was hospitalized when he received his first flu vaccine    Spiriva Handihaler [Tiotropium Bromide Monohydrate] Swelling     Tolerates both tudorza and incruse    Vilanterol        Problem List:    Patient Active Problem List   Diagnosis Code    Hilar adenopathy R59.0    Tobacco abuse Z72.0    GERD (gastroesophageal reflux disease) K21.9    Chest pain R07.9    Gout M10.9    Paresthesia of bilateral legs R20.2    Right knee pain M25.561    Numbness and tingling of right leg R20.0, R20.2    Osteoarthritis of multiple joints M15.9    Supraclavicular fossa fullness R22.2    Carpal tunnel syndrome of left wrist G56.02    Alcohol abuse F10.10    Hyponatremia E87.1    Hepatomegaly R16.0    Chronic alcoholic hepatitis F53.45    Ulnar neuropathy at elbow G56.20    Chronic pain G89.29    Malignant neoplasm of urinary bladder (HCC) C67.9    Mixed simple and mucopurulent chronic bronchitis (HCC) J41.8    Ulnar nerve entrapment G56.20    Mixed hyperlipidemia E78.2    Bladder outlet obstruction N32.0    Diabetic ketoacidosis without coma associated with type 2 diabetes mellitus (HCC) E11.10    Insulin dependent type 2 diabetes mellitus, uncontrolled (HCC) E11.65, Z79.4    Bronchiectasis without complication (HCC) E72.7    Onychomycosis B35.1    Tinea pedis of both feet B35.3    Chronic idiopathic constipation N49.42    Alcoholic cirrhosis of liver without ascites (HCC) K70.30    Family history of rectal cancer Z80.0    Rectal bleeding K62.5    Polyp of colon K63.5    Diverticulosis of intestine without bleeding K57.90    Secondary esophageal varices without bleeding (HCC) I85.10    Erosive gastritis K29.60    Multiple duodenal ulcers K26.9    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, 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.60    Iron deficiency anemia D50.9    AVM (arteriovenous malformation) of small bowel, acquired K55.20    Uncontrolled hypertension I10    Severe claudication (HCC) I73.9    Hypercalcemia E83.52    MICHAEL (acute kidney injury) ([facility] [address] N17.9    Other constipation K59.09    Hypokalemia E87.6    Primary osteoarthritis of right knee M17.11    Venous insufficiency I87.2    Peripheral vascular disease (HCC) I73.9    Diabetic polyneuropathy associated with type 2 diabetes mellitus (HCC) E11.42    Situational anxiety F41.8    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) I74.09    GI bleed K92.2    Anemia D64.9    Gastritis K29.70       Past Medical History:        Diagnosis Date    Bronchitis chronic     Chest pain     Chronic cough     Cirrhosis of liver ([facility] [address] 01/01/2017    stage 4     COPD (chronic obstructive pulmonary disease) ([facility] [address]  COPD (chronic obstructive pulmonary disease) (HCC)     Diabetes mellitus (HCC)     Gout     Hilar adenopathy     Hyperlipidemia     Hypertension     Knee pain, right     Numbness and tingling of leg     Osteoarthritis     Paresthesia of bilateral legs     PVD (peripheral vascular disease) ([address]     Seizures (HCC)     ongoing, began after swine flu vaccination    Substance abuse [facility])        Past Surgical History:        Procedure Laterality Date    AORTO-FEMORAL BYPASS GRAFT  2020    AORTO-ILIAC BYPASS GRAFT N/A 2020    AORTOBIFEMORAL BYPASS GRAFT performed by Gris Chavez MD at [address] [facility]  2011    bronch with EBUS    COLONOSCOPY N/A 2018    COLONOSCOPY WITH BIOPSY performed by Smita Seymour MD at [address] [facility] COLONOSCOPY N/A 2018    COLONOSCOPY POLYPECTOMY SNARE/COLD BIOPSY performed by Smita Seymour MD at [address] [facility]  2/4/15    Urethral Dilatation, Resection of Bladder Tumor    CYSTOSCOPY  2016    fulgeration of bladder tumor    ELBOW SURGERY Left     ENDOSCOPY, SMALL BOWEL N/A 2019    BOWEL SMALL CAPSULE ENDOSCOPY performed by Smita Seymour MD at [facility]  2014    cystoscopy, bladder biopsy    OTHER SURGICAL HISTORY  2015    cystoscopy, urethral dilatation, bladder tumor follow up   [address] N/A 2018    [address]GD BI[address]SY [address]mit[address]our MD [address] N/A 2022    EGD BIOPSY performed by Roseline Lombard, MD at [facility] History:    Social History     Tobacco Use    Smoking status: Former Smoker     Packs/day: 1.00     Years: 42.00     Pack years: 42.00     Types: Cigarettes, Cigars     Start date: 2018     Quit date: 2022     Years since quittin.0    Smokeless tobacco: Former User     Types: Snuff Substance Use Topics    Alcohol use: Yes     Alcohol/week: 6.0 standard drinks     Types: 6 Cans of beer per week     Comment: patient states a 6 pack lasts him a week                                Counseling given: Not Answered      Vital Signs (Current):   Vitals:    05/09/22 2309 05/10/22 0354 05/10/22 0810 05/10/22 0927   BP:  (!) 184/85  (!) 176/83   Pulse:  98  100   Resp: 20 18 18 20   Temp:  98 °F (36.7 °C)     TempSrc:  Oral     SpO2: 97% 93% 95% 95%   Weight:       Height:                                                  BP Readings from Last 3 Encounters:   05/10/22 (!) 176/83   05/09/22 124/64   05/05/22 (!) 139/59       NPO Status: Time of last liquid consumption: 0900 (sip of water with cardizem)                        Time of last solid consumption: 1300                        Date of last liquid consumption: 05/10/22                        Date of last solid food consumption: 05/08/22    BMI:   Wt Readings from Last 3 Encounters:   05/08/22 228 lb 2.8 oz (103.5 kg)   05/05/22 229 lb (103.9 kg)   01/10/22 226 lb (102.5 kg)     Body mass index is 35.74 kg/m².     CBC:   Lab Results   Component Value Date    WBC 17.0 05/10/2022    RBC 3.73 05/10/2022    HGB 7.7 05/10/2022    HCT 25.0 05/10/2022    MCV 67.2 05/10/2022    RDW 19.0 05/10/2022     05/10/2022       CMP:   Lab Results   Component Value Date     05/10/2022    K 3.5 05/10/2022    K 4.6 05/09/2022    CL 96 05/10/2022    CO2 31 05/10/2022    BUN 22 05/10/2022    CREATININE 0.9 05/10/2022    GFRAA >60 05/10/2022    GFRAA >60 04/02/2013    AGRATIO 1.3 05/09/2022    LABGLOM >60 05/10/2022    GLUCOSE 134 05/10/2022    PROT 6.1 05/09/2022    PROT 9.1 01/12/2011    CALCIUM 8.5 05/10/2022    BILITOT 0.3 05/09/2022    ALKPHOS 73 05/09/2022    AST 27 05/09/2022    ALT 22 05/09/2022       POC Tests:   Recent Labs     05/10/22  0704   POCGLU 160*       Coags:   Lab Results   Component Value Date    PROTIME 13.3 05/09/2022    INR 1.17 05/09/2022    APTT 31.7 06/29/2019       HCG (If Applicable): No results found for: PREGTESTUR, PREGSERUM, HCG, HCGQUANT     ABGs:   Lab Results   Component Value Date    PHART 7.349 08/24/2020    PO2ART 83.5 08/24/2020    GYV4SMF 43.8 08/24/2020    FQG9IDS 23.6 08/24/2020    BEART -2.0 08/24/2020    U4ZEUTWN 94.4 08/24/2020        Type & Screen (If Applicable):  No results found for: LABABO, LABRH    Drug/Infectious Status (If Applicable):  No results found for: HIV, HEPCAB    COVID-19 Screening (If Applicable):   Lab Results   Component Value Date    COVID19 Not Detected 05/05/2022    COVID19 Not Detected 10/26/2021           Anesthesia Evaluation  Patient summary reviewed and Nursing notes reviewed no history of anesthetic complications:   Airway: Mallampati: III     Neck ROM: full   Dental:          Pulmonary:Negative Pulmonary ROS and normal exam    (+) COPD:                             Cardiovascular:Negative CV ROS    (+) hypertension:,                   Neuro/Psych:   Negative Neuro/Psych ROS  (+) neuromuscular disease:, psychiatric history:            GI/Hepatic/Renal: Neg GI/Hepatic/Renal ROS  (+) GERD: well controlled, PUD, liver disease:,      (-) hiatal hernia       Endo/Other: Negative Endo/Other ROS   (+) Diabetes, . Abdominal:             Vascular: Other Findings:             Anesthesia Plan      general     ASA 3     (I discussed with the patient the risks and benefits of PIV, general anesthesia, IV Narcotics, PACU. All questions were answered the patient agrees with the plan and wishes to proceed.  )  Induction: intravenous. Pre-Operative Diagnosis: GI bleed [K92.2]    73 y.o.   BMI:  Body mass index is 35.74 kg/m².      Vitals:    05/09/22 2309 05/10/22 0354 05/10/22 0810 05/10/22 0927   BP:  (!) 184/85  (!) 176/83   Pulse:  98  100   Resp: 20 18 18 20   Temp:  98 °F (36.7 °C)     TempSrc:  Oral     SpO2: 97% 93% 95% 95%   Weight:       Height: Allergies   Allergen Reactions    Lisinopril Swelling    Ace Inhibitors Swelling    Influenza Vaccines      He states he was hospitalized when he received his first flu vaccine    Spiriva Handihaler [Tiotropium Bromide Monohydrate] Swelling     Tolerates both tudorza and incruse    Vilanterol        Social History     Tobacco Use    Smoking status: Former Smoker     Packs/day: 1.00     Years: 42.00     Pack years: 42.00     Types: Cigarettes, Cigars     Start date: 2018     Quit date: 2022     Years since quittin.0    Smokeless tobacco: Former User     Types: Snuff   Substance Use Topics    Alcohol use:  Yes     Alcohol/week: 6.0 standard drinks     Types: 6 Cans of beer per week     Comment: patient states a 6 pack lasts him a week       LABS:    CBC  Lab Results   Component Value Date/Time    WBC 17.0 (H) 05/10/2022 06:03 AM    HGB 7.7 (L) 05/10/2022 06:03 AM    HCT 25.0 (L) 05/10/2022 06:03 AM     05/10/2022 06:03 AM     RENAL  Lab Results   Component Value Date/Time     05/10/2022 06:03 AM    K 3.5 05/10/2022 06:03 AM    K 4.6 2022 05:35 AM    CL 96 (L) 05/10/2022 06:03 AM    CO2 31 05/10/2022 06:03 AM    BUN 22 (H) 05/10/2022 06:03 AM    CREATININE 0.9 05/10/2022 06:03 AM    GLUCOSE 134 (H) 05/10/2022 06:03 AM     COAGS  Lab Results   Component Value Date/Time    PROTIME 13.3 (H) 2022 12:42 AM    INR 1.17 (H) 2022 12:42 AM    APTT 31.7 2019 03:50 PM         Omar Bloom MD   5/10/2022

## 2022-05-10 NOTE — PROGRESS NOTES
RT Inhaler-Nebulizer Bronchodilator Protocol Note    There is a bronchodilator order in the chart from a provider indicating to follow the RT Bronchodilator Protocol and there is an Initiate RT Inhaler-Nebulizer Bronchodilator Protocol order as well (see protocol at bottom of note). CXR Findings:  No results found. The findings from the last RT Protocol Assessment were as follows:   History Pulmonary Disease: Chronic pulmonary disease  Respiratory Pattern: Regular pattern and RR 12-20 bpm  Breath Sounds: Inspiratory and expiratory or bilateral wheezing and/or rhonchi  Cough: Strong, spontaneous, non-productive  Indication for Bronchodilator Therapy: On home bronchodilators  Bronchodilator Assessment Score: 8    Aerosolized bronchodilator medication orders have been revised according to the RT Inhaler-Nebulizer Bronchodilator Protocol below. Respiratory Therapist to perform RT Therapy Protocol Assessment initially then follow the protocol. Repeat RT Therapy Protocol Assessment PRN for score 0-3 or on second treatment, BID, and PRN for scores above 3. No Indications - adjust the frequency to every 6 hours PRN wheezing or bronchospasm, if no treatments needed after 48 hours then discontinue using Per Protocol order mode. If indication present, adjust the RT bronchodilator orders based on the Bronchodilator Assessment Score as indicated below. Use Inhaler orders unless patient has one or more of the following: on home nebulizer, not able to hold breath for 10 seconds, is not alert and oriented, cannot activate and use MDI correctly, or respiratory rate 25 breaths per minute or more, then use the equivalent nebulizer order(s) with same Frequency and PRN reasons based on the score. If a patient is on this medication at home then do not decrease Frequency below that used at home.     0-3 - enter or revise RT bronchodilator order(s) to equivalent RT Bronchodilator order with Frequency of every 4 hours PRN for wheezing or increased work of breathing using Per Protocol order mode. 4-6 - enter or revise RT Bronchodilator order(s) to two equivalent RT bronchodilator orders with one order with BID Frequency and one order with Frequency of every 4 hours PRN wheezing or increased work of breathing using Per Protocol order mode. 7-10 - enter or revise RT Bronchodilator order(s) to two equivalent RT bronchodilator orders with one order with TID Frequency and one order with Frequency of every 4 hours PRN wheezing or increased work of breathing using Per Protocol order mode. 11-13 - enter or revise RT Bronchodilator order(s) to one equivalent RT bronchodilator order with QID Frequency and an Albuterol order with Frequency of every 4 hours PRN wheezing or increased work of breathing using Per Protocol order mode. Greater than 13 - enter or revise RT Bronchodilator order(s) to one equivalent RT bronchodilator order with every 4 hours Frequency and an Albuterol order with Frequency of every 2 hours PRN wheezing or increased work of breathing using Per Protocol order mode.        Electronically signed by Zara Silveira RCP on 5/9/2022 at 8:03 PM

## 2022-05-10 NOTE — DISCHARGE SUMMARY
steroids  White count improving now 25-> 18.8-> 17-> 12.5      # DM  -Continue Lantus and ISS     #Hypertension  -Blood pressure stable  -  on Cardizem. Resumed Toprol     #Hyperlipidemia  -Continue statins      #Home meds reviewed  resume Neurontin     Code Status: Full Code   ADULT DIET; Regular; 4 carb choices (60 gm/meal); Low Sodium (2 gm)       Procedures (Please Review Full Report for Details)  EGD  Colonoscopy    Consults    GI      Physical Exam at Discharge:    /66   Pulse 95   Temp 97.9 °F (36.6 °C) (Oral)   Resp 18   Ht 5' 7\" (1.702 m)   Wt 228 lb 2.8 oz (103.5 kg)   SpO2 98%   BMI 35.74 kg/m²     Physical Exam:  General:  Awake, alert, NAD  Skin:  Warm and dry  Neck:  JVD absent. Neck supple  Chest:  Clear to auscultation, respiration easy. No wheezes, rales or rhonchi. Cardiovascular:  RRR ,S1S2 normal  Abdomen:  Soft, non tender, non distended, BS +  Extremities:  No edema. Intact peripheral pulses. Brisk cap refill, < 2 secs  Neuro: non focal         CBC:   Recent Labs     05/09/22  0535 05/09/22  1506 05/10/22  0603 05/10/22  0603 05/10/22  1253 05/10/22  1830 05/11/22  0550   WBC 18.9*  --  17.0*  --   --   --  12.5*   HGB 7.2*   < > 7.7*   < > 9.1* 8.4* 8.3*   HCT 23.6*   < > 25.0*   < > 30.8* 27.7* 27.3*   MCV 67.2*  --  67.2*  --   --   --  67.2*     --  270  --   --   --  271    < > = values in this interval not displayed. BMP:   Recent Labs     05/08/22  1900 05/09/22  0042 05/09/22  0535 05/10/22  0603 05/11/22  0550   NA   < > 133* 135* 140 138   K   < > 4.5 4.6 3.5 3.8   CL   < > 94* 95* 96* 97*   CO2   < > 28 33* 31 30   PHOS  --  4.7  --   --   --    BUN   < > 33* 32* 22* 16   CREATININE   < > 1.6* 1.4* 0.9 0.9    < > = values in this interval not displayed.      LIVER PROFILE:   Recent Labs     05/08/22  1900 05/09/22  0535 05/11/22  0550   AST 36 27 34   ALT 22 22 39   BILITOT 0.4 0.3 0.5   ALKPHOS 79 73 129     PT/INR:   Recent Labs     05/09/22  0042 PROTIME 13.3*   INR 1.17*     APTT: No results for input(s): APTT in the last 72 hours. UA:  Recent Labs     05/09/22  0927   COLORU Yellow   PHUR 6.5   CLARITYU Clear   SPECGRAV 1.010   LEUKOCYTESUR Negative   UROBILINOGEN 1.0   BILIRUBINUR Negative   BLOODU Negative   GLUCOSEU Negative             RADIOLOGY  US GALLBLADDER RUQ   Preliminary Result   1. Hepatic findings consistent with changes related to patient's known   clinical history of cirrhosis. 2. No evidence of focal hepatic abnormality. US RENAL COMPLETE   Final Result   Unremarkable ultrasound of the kidneys and urinary bladder.                Discharge Medications     Medication List      START taking these medications    ferrous sulfate 325 (65 Fe) MG tablet  Commonly known as: IRON 325  Take 1 tablet by mouth daily (with breakfast)        CONTINUE taking these medications    albuterol sulfate  (90 Base) MCG/ACT inhaler  INHALE 2 PUFFS EVERY 6 HOURS AS NEEDED FOR WHEEZING     Alcohol Prep 70 % Pads  USE AS DIRECTED     allopurinol 300 MG tablet  Commonly known as: ZYLOPRIM  TAKE 1 TABLET BY MOUTH DAILY     atorvastatin 40 MG tablet  Commonly known as: LIPITOR  TAKE 1 TABLET BY MOUTH DAILY     Blood Pressure Cuff Misc  Please dispense insurance preference     Cartia  MG extended release capsule  Generic drug: dilTIAZem  TAKE 2 CAPSULES ONCE DAILY     DULoxetine 60 MG extended release capsule  Commonly known as: CYMBALTA  TAKE ONE CAPSULE BY MOUTH EVERY DAY     folic acid 1 MG tablet  Commonly known as: FOLVITE  TAKE 1 TABLET BY MOUTH DAILY     FreeStyle Freedom Lite w/Device Kit  1 kit by Does not apply route daily     gabapentin 300 MG capsule  Commonly known as: NEURONTIN     ipratropium-albuterol 0.5-2.5 (3) MG/3ML Soln nebulizer solution  Commonly known as: DUONEB  Inhale 3 mLs into the lungs every 4 hours     Lantus SoloStar 100 UNIT/ML injection pen  Generic drug: insulin glargine  INJECT 25 UNITS INTO THE SKIN DAILY WITH SUPPER     metoprolol succinate 50 MG extended release tablet  Commonly known as: TOPROL XL  TAKE ONE TABLET BY MOUTH EVERY DAY     Narcan 4 MG/0.1ML Liqd nasal spray  Generic drug: naloxone     ondansetron 4 MG tablet  Commonly known as: ZOFRAN  Take 1 tablet by mouth every 6 hours as needed for Nausea or Vomiting     oxyCODONE 5 MG immediate release tablet  Commonly known as: ROXICODONE     OXYGEN     pantoprazole 40 MG tablet  Commonly known as: PROTONIX  TAKE ONE TABLET BY MOUTH EVERY MORNING BEFORE BREAKFAST     PenTips 31G X 8 MM Misc  Generic drug: Insulin Pen Needle  USE ONCE DAILY WITH INJECTION     thiamine 100 MG tablet  TAKE 1 TABLET BY MOUTH DAILY     Trelegy Ellipta 100-62.5-25 MCG/INH Aepb  Generic drug: fluticasone-umeclidin-vilant  Inhale 1 puff into the lungs daily     True Metrix Blood Glucose Test strip  Generic drug: blood glucose test strips  USE 1 STRIP IN VITRO ROUTE 2 TIMES DAILY     TRUEplus Lancets 30G Misc  TEST 2 TIMES DAILY        STOP taking these medications    diclofenac sodium 1 % Gel  Commonly known as: Voltaren     Flovent  MCG/ACT inhaler  Generic drug: fluticasone     furosemide 20 MG tablet  Commonly known as: LASIX           Where to Get Your Medications      These medications were sent to [address] [place] [address] [phone] - [phone]  [address] [place] [address]    Phone: [phone]   · ferrous sulfate 325 (65 Fe) MG tablet           Discharged in stable condition to home       Total time 45 minutes. > 50%  dominated by counseling and coordination of care. Plan of care discussed with GI attending, discussed with patient and RN      Follow Up:   Follow up with PCP in 1 week and with GI     Cherry Sumner MD

## 2022-05-10 NOTE — PROGRESS NOTES
IM Progress Note    Admit Date:  5/8/2022    Admitted with anemia, GI bleed   History of alcoholic liver cirrhosis. .    S/p EGd  On 5/9 which showed mild antral gastritis and grade 1 esophageal varices  S/p 3 today 5/10 which showed mild sigmoid diverticulosis    Subjective:  Mr. Sanchez Person is i awake alert and oriented. He is in no distress  Hemoglobin hematocrit stable. Oxygen saturation stable at baseline 2.5 L. Objective:   BP (!) 159/81   Pulse 104   Temp 97.1 °F (36.2 °C) (Oral)   Resp 18   Ht 5' 7\" (1.702 m)   Wt 228 lb 2.8 oz (103.5 kg)   SpO2 97%   BMI 35.74 kg/m²       Intake/Output Summary (Last 24 hours) at 5/10/2022 1757  Last data filed at 5/10/2022 1015  Gross per 24 hour   Intake 300 ml   Output 1875 ml   Net -1575 ml         Physical Exam:  General:  Awake, alert, NAD  Obese  Skin:  Warm and dry  Neck:  JVD absent. Neck supple  Chest:  Clear to auscultation, respiration easy. No wheezes, rales or rhonchi. Cardiovascular:  RRR ,S1S2 normal  Abdomen:  Soft, non tender, non distended, BS +  Extremities:  No edema. Intact peripheral pulses.  Brisk cap refill, < 2 secs  Neuro: non focal      Medications:   Scheduled Meds:   sodium chloride flush  5-40 mL IntraVENous 2 times per day    sodium chloride flush  5-40 mL IntraVENous 2 times per day    iron sucrose (VENOFER) iv piggyback 100 mL (Admin over 60 minutes)  100 mg IntraVENous Daily    allopurinol  300 mg Oral Daily    atorvastatin  40 mg Oral Daily    dilTIAZem  180 mg Oral Daily    DULoxetine  60 mg Oral Daily    folic acid  1,857 mcg Oral Daily    [Held by provider] furosemide  20 mg Oral Daily    insulin glargine  25 Units SubCUTAneous Dinner    insulin lispro  0-6 Units SubCUTAneous TID WC    insulin lispro  0-3 Units SubCUTAneous Nightly    sodium chloride flush  5-40 mL IntraVENous 2 times per day    pantoprazole (PROTONIX) 40 mg injection  40 mg IntraVENous 2 times per day    budesonide-formoterol  2 puff Inhalation BID    ipratropium-albuterol  1 vial Inhalation Q4H While awake       Continuous Infusions:   sodium chloride      sodium chloride      dextrose      sodium chloride         Data:  CBC:   Recent Labs     05/08/22  1900 05/09/22  0042 05/09/22  0535 05/09/22  1506 05/10/22  0017 05/10/22  0603 05/10/22  1253   WBC 25.5*  --  18.9*  --   --  17.0*  --    RBC 3.51*  --  3.51*  --   --  3.73*  --    HGB 7.3*   < > 7.2*   < > 7.6* 7.7* 9.1*   HCT 23.8*   < > 23.6*   < > 25.0* 25.0* 30.8*   MCV 67.7*  --  67.2*  --   --  67.2*  --    RDW 19.4*  --  19.4*  --   --  19.0*  --      --  258  --   --  270  --     < > = values in this interval not displayed. BMP:   Recent Labs     05/09/22  0042 05/09/22  0535 05/10/22  0603   * 135* 140   K 4.5 4.6 3.5   CL 94* 95* 96*   CO2 28 33* 31   PHOS 4.7  --   --    BUN 33* 32* 22*   CREATININE 1.6* 1.4* 0.9     BNP: No results for input(s): BNP in the last 72 hours. PT/INR:   Recent Labs     05/09/22 0042   PROTIME 13.3*   INR 1.17*     APTT: No results for input(s): APTT in the last 72 hours. CARDIAC ENZYMES: No results for input(s): CKMB, CKMBINDEX, TROPONINI in the last 72 hours. Invalid input(s): CKTOTAL;3  FASTING LIPID PANEL:  Lab Results   Component Value Date    CHOL 154 04/14/2021    HDL 29 (L) 04/14/2021    TRIG 152 (H) 04/14/2021     LIVER PROFILE:   Recent Labs     05/08/22 1900 05/09/22  0535   AST 36 27   ALT 22 22   BILITOT 0.4 0.3   ALKPHOS 79 73              Radiology  US GALLBLADDER RUQ   Preliminary Result   1. Hepatic findings consistent with changes related to patient's known   clinical history of cirrhosis. 2. No evidence of focal hepatic abnormality. US RENAL COMPLETE   Final Result   Unremarkable ultrasound of the kidneys and urinary bladder. Assessment:  Active Problems:    GI bleed    Anemia    Gastritis  Resolved Problems:    * No resolved hospital problems.  *      Plan:     # GIB  # Acute blood loss anemia  #Iron deficiency anemia  -  GI consulted  -S/p endoscopic evaluation  S/p EGd  On 5/9 which showed mild antral gastritis and grade 1 esophageal varices  S/p 3 today 5/10 which showed mild sigmoid diverticulosis  -Plan is for capsule endoscopy tomorrow  -Monitored  telemetry   - trended h/h. Transfuse as needed. Hemoglobin stable today  -continue PPI   -Start IV iron  -Likely can be discharged tomorrow on oral iron. With outpatient GI follow-up    #History of alcoholic cirrhosis  -Abdominal ultrasound as above consistent with cirrhosis    #Chronic hypoxic respiratory failure  #COPD  -On home oxygen 2.5 L. O2 sat stable on 2.5 L today  -Continue nebulizers, Symbicort  -S/p Solu-Medrol    #MICHAEL   -Resolved  -Renal ultrasound normal  -Held lasix  -Given IV fluids  - Monitored BMP  Creatinine 1.7-. 1.4-> 0.9  MICHAEL resolved now, stop IV fluids     #Leukocytosis  - suspect secondary to steroids  White count improving now 25-> 18.8-> 17      # DM  -Continue Lantus and ISS    #Hypertension  -Blood pressure stable, on Cardizem  Resume Toprol    #Hyperlipidemia  -Continue statins     #Home meds reviewed  Patient is on high-dose Neurontin 900 mg 3 times daily. Likely due to history of seizure disorder  We will resume Neurontin at 600 mg 3 times daily       Code Status: Full Code   ADULT DIET; Regular; 4 carb choices (60 gm/meal);  Low Sodium (2 gm)        Enid Chakraborty MD   5/10/2022 5:57 PM

## 2022-05-10 NOTE — PROCEDURES
None    Findings:  Visualization of the terminal ileum demonstrated normal mucosa. The mucosa in cecum, ascending colon, transverse colon, descending colon, sigmoid colon and rectum was normal.  Mild sigmoid diverticulosis. Retroflexion in the rectum revealed small internal hemorrhoids.     Impression:    Mild sigmoid diverticulosis  Normal mucosa seen otherwise up to TI      Recommendations:    · IV iron infusions  · Capsule endoscopy for further evaluation of his iron deficiency anemia which can be done as outpatient on discharge  · Resume diet  · Continue with symptomatic supportive care          Kristie Kasper[address]  [phone]    5/10/2022

## 2022-05-10 NOTE — ANESTHESIA POSTPROCEDURE EVALUATION
Department of Anesthesiology  Postprocedure Note    Patient: Ami Roger  MRN: [id number]  YOB: 1955  Date of evaluation: 5/10/2022  Time:  1:11 PM     Procedure Summary     Date: 05/10/22 Room / Location: [place] [address] / [facility]    Anesthesia Start: 1001 Anesthesia Stop: 8447    Procedure: COLON W/ANES. (N/A ) Diagnosis: (GI BLEED)    Surgeons: Daneen Hamman, MD Responsible Provider: Gavin Reynoso MD    Anesthesia Type: general ASA Status: 3          Anesthesia Type: No value filed. Radha Phase I: Radha Score: 8    Radha Phase II: Radha Score: 9    Last vitals: Reviewed and per EMR flowsheets.        Anesthesia Post Evaluation    Comments: Postoperative Anesthesia Note    Name:    Ami Roger  MRN:      [id number]    Patient Vitals in the past 12 hrs:  05/10/22 1306, BP:(!) 159/81, Temp:97.1 °F (36.2 °C), Temp src:Oral, Pulse:104, Resp:18, SpO2:97 %  05/10/22 1119, BP:(!) 172/85, Temp:97.4 °F (36.3 °C), Temp src:Oral, Pulse:97, Resp:18, SpO2:96 %  05/10/22 1040, BP:127/85, Pulse:93, Resp:18, SpO2:98 %  05/10/22 1020, BP:112/64, Temp:97.6 °F (36.4 °C), Temp src:Infrared, Pulse:110, Resp:20, SpO2:94 %  05/10/22 0927, BP:(!) 176/83, Pulse:100, Resp:20, SpO2:95 %  05/10/22 0810, Resp:18, SpO2:95 %  05/10/22 0354, BP:(!) 184/85, Temp:98 °F (36.7 °C), Temp src:Oral, Pulse:98, Resp:18, SpO2:93 %     LABS:    CBC  Lab Results       Component                Value               Date/Time                  WBC                      17.0 (H)            05/10/2022 06:03 AM        HGB                      9.1 (L)             05/10/2022 12:53 PM        HCT                      30.8 (L)            05/10/2022 12:53 PM        PLT                      270                 05/10/2022 06:03 AM   RENAL  Lab Results       Component                Value               Date/Time                  NA                       140                 05/10/2022 06:03 AM        K 3.5                 05/10/2022 06:03 AM        K                        4.6                 05/09/2022 05:35 AM        CL                       96 (L)              05/10/2022 06:03 AM        CO2                      31                  05/10/2022 06:03 AM        BUN                      22 (H)              05/10/2022 06:03 AM        CREATININE               0.9                 05/10/2022 06:03 AM        GLUCOSE                  134 (H)             05/10/2022 06:03 AM   COAGS  Lab Results       Component                Value               Date/Time                  PROTIME                  13.3 (H)            05/09/2022 12:42 AM        INR                      1.17 (H)            05/09/2022 12:42 AM        APTT                     31.7                06/29/2019 03:50 PM     Intake & Output:  @79PDUK@    Nausea & Vomiting:  No    Level of Consciousness:  Awake    Pain Assessment:  Adequate analgesia    Anesthesia Complications:  No apparent anesthetic complications    SUMMARY      Vital signs stable  OK to discharge from Stage I post anesthesia care.   Care transferred from Anesthesiology department on discharge from perioperative area

## 2022-05-11 VITALS
RESPIRATION RATE: 18 BRPM | BODY MASS INDEX: 35.81 KG/M2 | TEMPERATURE: 97.9 F | HEIGHT: 67 IN | WEIGHT: 228.18 LBS | DIASTOLIC BLOOD PRESSURE: 66 MMHG | HEART RATE: 95 BPM | SYSTOLIC BLOOD PRESSURE: 127 MMHG | OXYGEN SATURATION: 98 %

## 2022-05-11 LAB
A/G RATIO: 1.4 (ref 1.1–2.2)
ALBUMIN SERPL-MCNC: 3.7 G/DL (ref 3.4–5)
ALP BLD-CCNC: 129 U/L (ref 40–129)
ALT SERPL-CCNC: 39 U/L (ref 10–40)
ANION GAP SERPL CALCULATED.3IONS-SCNC: 11 MMOL/L (ref 3–16)
AST SERPL-CCNC: 34 U/L (ref 15–37)
BILIRUB SERPL-MCNC: 0.5 MG/DL (ref 0–1)
BUN BLDV-MCNC: 16 MG/DL (ref 7–20)
CALCIUM SERPL-MCNC: 8.5 MG/DL (ref 8.3–10.6)
CHLORIDE BLD-SCNC: 97 MMOL/L (ref 99–110)
CO2: 30 MMOL/L (ref 21–32)
CREAT SERPL-MCNC: 0.9 MG/DL (ref 0.8–1.3)
GFR AFRICAN AMERICAN: >60
GFR NON-AFRICAN AMERICAN: >60
GLUCOSE BLD-MCNC: 133 MG/DL (ref 70–99)
GLUCOSE BLD-MCNC: 139 MG/DL (ref 70–99)
GLUCOSE BLD-MCNC: 154 MG/DL (ref 70–99)
GLUCOSE BLD-MCNC: 172 MG/DL (ref 70–99)
HCT VFR BLD CALC: 27.3 % (ref 40.5–52.5)
HEMOGLOBIN: 8.3 G/DL (ref 13.5–17.5)
MCH RBC QN AUTO: 20.5 PG (ref 26–34)
MCHC RBC AUTO-ENTMCNC: 30.5 G/DL (ref 31–36)
MCV RBC AUTO: 67.2 FL (ref 80–100)
PDW BLD-RTO: 19.4 % (ref 12.4–15.4)
PERFORMED ON: ABNORMAL
PLATELET # BLD: 271 K/UL (ref 135–450)
PMV BLD AUTO: 7.7 FL (ref 5–10.5)
POTASSIUM REFLEX MAGNESIUM: 3.8 MMOL/L (ref 3.5–5.1)
RBC # BLD: 4.06 M/UL (ref 4.2–5.9)
SODIUM BLD-SCNC: 138 MMOL/L (ref 136–145)
TOTAL PROTEIN: 6.4 G/DL (ref 6.4–8.2)
WBC # BLD: 12.5 K/UL (ref 4–11)

## 2022-05-11 PROCEDURE — 2580000003 HC RX 258: Performed by: NURSE PRACTITIONER

## 2022-05-11 PROCEDURE — 94640 AIRWAY INHALATION TREATMENT: CPT

## 2022-05-11 PROCEDURE — 99239 HOSP IP/OBS DSCHRG MGMT >30: CPT | Performed by: INTERNAL MEDICINE

## 2022-05-11 PROCEDURE — 80053 COMPREHEN METABOLIC PANEL: CPT

## 2022-05-11 PROCEDURE — 6360000002 HC RX W HCPCS: Performed by: NURSE PRACTITIONER

## 2022-05-11 PROCEDURE — 36415 COLL VENOUS BLD VENIPUNCTURE: CPT

## 2022-05-11 PROCEDURE — 94761 N-INVAS EAR/PLS OXIMETRY MLT: CPT

## 2022-05-11 PROCEDURE — 85027 COMPLETE CBC AUTOMATED: CPT

## 2022-05-11 PROCEDURE — 2700000000 HC OXYGEN THERAPY PER DAY

## 2022-05-11 PROCEDURE — 96366 THER/PROPH/DIAG IV INF ADDON: CPT

## 2022-05-11 PROCEDURE — C9113 INJ PANTOPRAZOLE SODIUM, VIA: HCPCS | Performed by: NURSE PRACTITIONER

## 2022-05-11 PROCEDURE — 6370000000 HC RX 637 (ALT 250 FOR IP): Performed by: NURSE PRACTITIONER

## 2022-05-11 PROCEDURE — 6370000000 HC RX 637 (ALT 250 FOR IP): Performed by: INTERNAL MEDICINE

## 2022-05-11 PROCEDURE — 96376 TX/PRO/DX INJ SAME DRUG ADON: CPT

## 2022-05-11 RX ORDER — FERROUS SULFATE 325(65) MG
325 TABLET ORAL
Qty: 30 TABLET | Refills: 1 | Status: SHIPPED | OUTPATIENT
Start: 2022-05-11 | End: 2022-07-06

## 2022-05-11 RX ADMIN — GABAPENTIN 600 MG: 300 CAPSULE ORAL at 08:21

## 2022-05-11 RX ADMIN — DULOXETINE HYDROCHLORIDE 60 MG: 60 CAPSULE, DELAYED RELEASE ORAL at 08:20

## 2022-05-11 RX ADMIN — METOPROLOL SUCCINATE 50 MG: 50 TABLET, EXTENDED RELEASE ORAL at 08:20

## 2022-05-11 RX ADMIN — Medication 2 PUFF: at 07:32

## 2022-05-11 RX ADMIN — ATORVASTATIN CALCIUM 40 MG: 40 TABLET, FILM COATED ORAL at 08:21

## 2022-05-11 RX ADMIN — IRON SUCROSE 100 MG: 20 INJECTION, SOLUTION INTRAVENOUS at 09:54

## 2022-05-11 RX ADMIN — Medication 40 MG: at 08:21

## 2022-05-11 RX ADMIN — FOLIC ACID 1000 MCG: 1 TABLET ORAL at 08:21

## 2022-05-11 RX ADMIN — Medication 100 MG: at 08:21

## 2022-05-11 RX ADMIN — INSULIN LISPRO 1 UNITS: 100 INJECTION, SOLUTION INTRAVENOUS; SUBCUTANEOUS at 08:13

## 2022-05-11 RX ADMIN — DILTIAZEM HYDROCHLORIDE 180 MG: 180 CAPSULE, COATED, EXTENDED RELEASE ORAL at 08:21

## 2022-05-11 RX ADMIN — IPRATROPIUM BROMIDE AND ALBUTEROL SULFATE 3 ML: .5; 3 SOLUTION RESPIRATORY (INHALATION) at 07:30

## 2022-05-11 RX ADMIN — ALLOPURINOL 300 MG: 300 TABLET ORAL at 08:21

## 2022-05-11 NOTE — PROGRESS NOTES
Progress Note    Patient Geovanna Park  MRN: [id number]  YOB: 1955 Age: 73 y.o. Sex: male  Room: [address] [place] [address]       Admitting Physician: Keri Lanes, MD   Date of Admission: 5/8/2022  5:51 PM   Primary Care Physician: Rosa Adam MD     Subjective:  Geovanna Park was seen and examined. We are following for anemia, melena. -- No acute events overnight  -- Denies abdominal pain or nausea/vomiting  -- Tolerating PO intake  -- Hgb stable. No overt GI bleeding reported. ROS:  Constitutional: Denies fever, no change in appetite  Respiratory: Denies cough or shortness of breath  Cardiovascular: Denies chest pain or edema    Objective:  Vital Signs:   Vitals:    05/11/22 0815   BP: 127/66   Pulse: 95   Resp: 18   Temp: 97.9 °F (36.6 °C)   SpO2: 98%         Physical Exam:  Constitutional: Alert and oriented x 4. No acute distress. Respiratory: Respirations nonlabored, no crepitus  GI: Abdomen nondistended, soft, and nontender. Neurological: No focal deficits noted. No asterixis.     Intake/Output:    Intake/Output Summary (Last 24 hours) at 5/11/2022 0823  Last data filed at 5/11/2022 0550  Gross per 24 hour   Intake 300 ml   Output 2900 ml   Net -2600 ml        Current Medications:  Current Facility-Administered Medications   Medication Dose Route Frequency Provider Last Rate Last Admin    sodium chloride flush 0.9 % injection 5-40 mL  5-40 mL IntraVENous 2 times per day Javier Mendez MD   10 mL at 05/10/22 1247    sodium chloride flush 0.9 % injection 5-40 mL  5-40 mL IntraVENous PRN Javier Mendez MD        0.9 % sodium chloride infusion  25 mL IntraVENous PRN Javier Mendez MD        meperidine (DEMEROL) injection 12.5 mg  12.5 mg IntraVENous Q5 Min PRN Javier Mendez MD        HYDROmorphone (DILAUDID) injection 0.25 mg  0.25 mg IntraVENous Q5 Min PRN Javier Mendez MD        HYDROmorphone (DILAUDID) injection 0.5 mg  0.5 mg IntraVENous Q5 Min PRN Radha Arzola MD        sodium chloride flush 0.9 % injection 5-40 mL  5-40 mL IntraVENous 2 times per day Radha Arzola MD        sodium chloride flush 0.9 % injection 5-40 mL  5-40 mL IntraVENous PRN Radha Arzola MD        0.9 % sodium chloride infusion   IntraVENous PRN Radha Arzola MD        iron sucrose (VENOFER) 100 mg in sodium chloride 0.9 % 100 mL IVPB  100 mg IntraVENous Daily Lorin Iyer APRN - CNP   Stopped at 05/10/22 1927    metoprolol succinate (TOPROL XL) extended release tablet 50 mg  50 mg Oral Daily Dhiraj Moody MD   50 mg at 05/11/22 0820    thiamine tablet 100 mg  100 mg Oral Daily Dhiraj Moody MD   100 mg at 05/11/22 3540    gabapentin (NEURONTIN) capsule 600 mg  600 mg Oral TID Dhiraj Moody MD   600 mg at 05/11/22 0821    ipratropium-albuterol (DUONEB) nebulizer solution 3 mL  1 vial Inhalation BID Blake Evans MD   3 mL at 05/11/22 0730    allopurinol (ZYLOPRIM) tablet 300 mg  300 mg Oral Daily Chikis Fraction, APRN - CNP   300 mg at 05/11/22 4636    atorvastatin (LIPITOR) tablet 40 mg  40 mg Oral Daily Chikis Fraction, APRN - CNP   40 mg at 05/11/22 6967    dilTIAZem (CARDIZEM CD) extended release capsule 180 mg  180 mg Oral Daily Chikis Fraction, APRN - CNP   180 mg at 05/11/22 6049    DULoxetine (CYMBALTA) extended release capsule 60 mg  60 mg Oral Daily Chikis Fraction, APRN - CNP   60 mg at 88/93/17 1870    folic acid (FOLVITE) tablet 1,000 mcg  1,000 mcg Oral Daily Chikis Fraction, APRN - CNP   1,000 mcg at 05/11/22 5484    insulin glargine (LANTUS) injection vial 25 Units  25 Units SubCUTAneous Dinner Chikis Fraction, APRN - CNP   25 Units at 05/10/22 2053    glucagon (rDNA) injection 1 mg  1 mg IntraMUSCular PRN Chikis Fraction, APRN - CNP        dextrose 5 % solution  100 mL/hr IntraVENous PRN Chikis Fraction, APRN - CNP        insulin lispro (HUMALOG) injection vial 0-6 Units  0-6 Units SubCUTAneous TID WC Euna Flair, APRN - CNP   1 Units at 05/11/22 0813    insulin lispro (HUMALOG) injection vial 0-3 Units  0-3 Units SubCUTAneous Nightly Euna Flair, APRN - CNP   2 Units at 05/10/22 2054    sodium chloride flush 0.9 % injection 5-40 mL  5-40 mL IntraVENous 2 times per day Euna Flair, APRN - CNP   10 mL at 05/10/22 0943    sodium chloride flush 0.9 % injection 5-40 mL  5-40 mL IntraVENous PRN Euna Flair, APRN - CNP        0.9 % sodium chloride infusion   IntraVENous PRN Euna Flair, APRN - CNP        ondansetron (ZOFRAN-ODT) disintegrating tablet 4 mg  4 mg Oral Q8H PRN Euna Flair, APRN - CNP        Or    ondansetron [facility]) injection 4 mg  4 mg IntraVENous Q6H PRN Euna Flair, APRN - CNP        acetaminophen (TYLENOL) tablet 650 mg  650 mg Oral Q6H PRN Euna Flair, APRN - CNP        Or    acetaminophen (TYLENOL) suppository 650 mg  650 mg Rectal Q6H PRN Euna Flair, APRN - CNP        pantoprazole (PROTONIX) 40 mg in sodium chloride (PF) 10 mL injection  40 mg IntraVENous 2 times per day Euna Flair, APRN - CNP   40 mg at 05/11/22 3124    glucose chewable tablet 16 g  4 tablet Oral PRN Euna Flair, APRN - CNP        dextrose bolus 10% 125 mL  125 mL IntraVENous PRN Euna Flair, APRN - CNP        Or    dextrose bolus 10% 250 mL  250 mL IntraVENous PRN Euna Flair, APRN - CNP        budesonide-formoterol (SYMBICORT) 160-4.5 MCG/ACT inhaler 2 puff  2 puff Inhalation BID Euna Flair, APRN - CNP   2 puff at 05/11/22 0732    ipratropium-albuterol (DUONEB) nebulizer solution 1 ampule  1 ampule Inhalation Q4H PRN Sharon Stovall MD             Recent labs and imaging reviewed. Assessment:    61-year-old male with past medical history significant for chronic bronchitis, cirrhosis of the liver secondary to alcohol, COPD, diabetes mellitus, hyperlipidemia, hypertension, osteoarthritis, seizures, and peripheral vascular disease.   Presented to [facility] with worsening shortness of breath found to have COPD exacerbation. However noted to have worsening anemia and melena and therefore transferred to [facility] for further evaluation. History of alcohol cirrhosis for which she does not follow with any GI doctor. Last EGD 2018 showed grade 1 esophageal varices and duodenal ulcers. 5/9 EGD: Mild antral gastritis. Small grade 1 esophageal varices. 5/10 Colonoscopy: Mild sigmoid diverticulosis. Normal mucosa sen otherwise up to TI    Hgb has been stable. No further melena. Denies abdominal pain or nausea/vomiting. Plan:  1. IV Iron infusions as per primary team  2. Monitor H/H and overt signs of GI bleeding  3. Continue PPI  4. Capsule endoscopy as outpatient. Okay for discharge from GI standpoint. Will arrange follow up in 4 weeks  5. Supportive care         MD Myrna Javier    [phone].  Also available via [employer]

## 2022-05-11 NOTE — PROGRESS NOTES
Handoff report and transfer of care given at bedside to Elizabeth Chang. Patient in stable condition, denies needs/concerns at this time. Call light within reach.

## 2022-05-11 NOTE — PLAN OF CARE
Problem: Discharge Planning  Goal: Discharge to home or other facility with appropriate resources  5/11/2022 1029 by Amanda Haddad RN  Outcome: Progressing  5/11/2022 0210 by Mayela Palafox RN  Outcome: Progressing     Problem: Pain  Goal: Verbalizes/displays adequate comfort level or baseline comfort level  5/11/2022 1029 by Amanda Haddad RN  Outcome: Progressing  5/11/2022 0210 by Mayela Palafox RN  Outcome: Progressing     Problem: Safety - Adult  Goal: Free from fall injury  5/11/2022 1029 by Amanda Haddad RN  Outcome: Progressing  5/11/2022 0210 by Mayela Palafox RN  Outcome: Progressing     Problem: Skin/Tissue Integrity  Goal: Absence of new skin breakdown  Description: 1. Monitor for areas of redness and/or skin breakdown  2. Assess vascular access sites hourly  3. Every 4-6 hours minimum:  Change oxygen saturation probe site  4. Every 4-6 hours:  If on nasal continuous positive airway pressure, respiratory therapy assess nares and determine need for appliance change or resting period.   5/11/2022 1029 by Amanda Haddad RN  Outcome: Progressing  5/11/2022 0210 by Mayela Palafox RN  Outcome: Progressing     Problem: Chronic Conditions and Co-morbidities  Goal: Patient's chronic conditions and co-morbidity symptoms are monitored and maintained or improved  5/11/2022 1029 by Amanda Haddad RN  Outcome: Progressing  5/11/2022 0210 by Mayela Palafox RN  Outcome: Progressing

## 2022-05-11 NOTE — PLAN OF CARE

## 2022-05-11 NOTE — PROGRESS NOTES
AM Shift assessment completed. Pt is alert and oriented. Vital signs are WNL. Respirations are even & easy. No complaints voiced. Pt denies needs at this time. SR up x 2 and bed in low position. Call light is within reach. Will monitor. .Bedside Mobility Assessment Tool (BMAT):     Assessment Level 1- Sit and Shake    1. From a semi-reclined position, ask patient to sit up and rotate to a seated position at the side of the bed. Can use the bedrail. 2. Ask patient to reach out and grab your hand and shake making sure patient reaches across his/her midline. Pass- Patient is able to come to a seated position, maintain core strength. Maintains seated balance while reaching across midline. Move on to Assessment Level 2. Assessment Level 2- Stretch and Point   1. With patient in seated position at the side of the bed, have patient place both feet on the floor (or stool) with knees no higher than hips. 2. Ask patient to stretch one leg and straighten the knee, then bend the ankle/flex and point the toes. If appropriate, repeat with the other leg. Pass- Patient is able to demonstrate appropriate quad strength on intended weight bearing limb(s). Move onto Assessment Level 3. Assessment Level 3- Stand   1. Ask patient to elevate off the bed or chair (seated to standing) using an assistive device (cane, bedrail). 2. Patient should be able to raise buttocks off be and hold for a count of five. May repeat once. Pass- Patient maintains standing stability for at least 5 seconds, proceed to assessment level 4. Assessment Level 4- Walk   1. Ask patient to march in place at bedside. 2. Then ask patient to advance step and return each foot. Some medical conditions may render a patient from stepping backwards, use your best clinical judgement.    Pass- Patient demonstrates balance while shifting weight and ability to step, takes independent steps, does not use assistive device patient is MOBILITY LEVEL 4.       Mobility Level- 4

## 2022-05-11 NOTE — CARE COORDINATION
DISCHARGE ORDER  Date/Time 2022 2:54 PM  Completed by: Lesly Lane RN, Case Management    Patient Name: Cassidy Kaiser      : 1955  Admitting Diagnosis: GI bleed [K92.2]      Admit order Date and Status: 22 inpt  (verify MD's last order for status of admission)      Noted discharge order. If applicable PT/OT recommendation at Discharge:  N/A  DME recommendation by PT/OT:N/A  Confirmed discharge plan : Yes  with whom patient  If pt confirmed DC plan does family need to be contacted by CM No  Discharge Plan:  Order for dc noted. Spoke with pt who cont plan to return home. Discussed O2 for transport home and pt states uses only at nite. Noted O2 sat is 92% on RA with activity. Discussed HHC and pt declines. Chart reviewed and no other dc needs identified. Reviewed chart. Role of discharge planner explained and patient verbalized understanding. Discharge order is noted. Has Home O2 in place on admit:  Yes  Informed of need to bring portable home O2 tank on day of discharge for nursing to connect prior to leaving:   Yes  Verbalized agreement/Understanding:   Yes  Pt is being d/c'd to  home today. Pt's O2 sats are 928% on RA with activity. Discharge timeout done with  Nsg, CM and pt. All discharge needs and concerns addressed.

## 2022-05-11 NOTE — PROGRESS NOTES
O2 Sat at rest on room air is 94 %. O2 Sat with activity on room air is 92%. [facility]

## 2022-05-11 NOTE — FLOWSHEET NOTE
05/10/22 2011   Vital Signs   Temp 97.7 °F (36.5 °C)   Temp Source Oral   Pulse 106   Heart Rate Source Monitor   Resp 18   BP (!) 153/73   BP Location Left upper arm   MAP (Calculated) 99.67   Patient Position Semi fowlers   Level of Consciousness Alert (0)   MEWS Score 2   Oxygen Therapy   SpO2 96 %   O2 Device Nasal cannula   O2 Flow Rate (L/min) 3 L/min   Pt given po meds at this time. Pt is high fall risk, refusing bed alarm. Pt using urinal at bedside and BSC as needed. Steady gait.  Harini Juarez RN

## 2022-05-11 NOTE — PROGRESS NOTES
Patient refusing bed alarm. All efforts have been made to educate patient on fall interventions and the benefits of the bed alarm system. Refusal has been escalated to [employer], however patient continues to refuse.     Interventions to mitigate the risk of falls include:  low beds,  increase frequency of rounding, Gait belt, keep door open, bedside commode, intentional toileting,Yellow socks, gown and bracelet,       Primary Nurse eSignature; Carrillo Leblanc RN

## 2022-05-11 NOTE — PROGRESS NOTES
RT Inhaler-Nebulizer Bronchodilator Protocol Note    There is a bronchodilator order in the chart from a provider indicating to follow the RT Bronchodilator Protocol and there is an Initiate RT Inhaler-Nebulizer Bronchodilator Protocol order as well (see protocol at bottom of note). CXR Findings:  No results found. The findings from the last RT Protocol Assessment were as follows:   History Pulmonary Disease: Chronic pulmonary disease  Respiratory Pattern: Regular pattern and RR 12-20 bpm  Breath Sounds: Slightly diminished and/or crackles  Cough: Strong, spontaneous, non-productive  Indication for Bronchodilator Therapy: Decreased or absent breath sounds  Bronchodilator Assessment Score: 4    Aerosolized bronchodilator medication orders have been revised according to the RT Inhaler-Nebulizer Bronchodilator Protocol below. Respiratory Therapist to perform RT Therapy Protocol Assessment initially then follow the protocol. Repeat RT Therapy Protocol Assessment PRN for score 0-3 or on second treatment, BID, and PRN for scores above 3. No Indications - adjust the frequency to every 6 hours PRN wheezing or bronchospasm, if no treatments needed after 48 hours then discontinue using Per Protocol order mode. If indication present, adjust the RT bronchodilator orders based on the Bronchodilator Assessment Score as indicated below. Use Inhaler orders unless patient has one or more of the following: on home nebulizer, not able to hold breath for 10 seconds, is not alert and oriented, cannot activate and use MDI correctly, or respiratory rate 25 breaths per minute or more, then use the equivalent nebulizer order(s) with same Frequency and PRN reasons based on the score. If a patient is on this medication at home then do not decrease Frequency below that used at home.     0-3 - enter or revise RT bronchodilator order(s) to equivalent RT Bronchodilator order with Frequency of every 4 hours PRN for wheezing or increased work of breathing using Per Protocol order mode. 4-6 - enter or revise RT Bronchodilator order(s) to two equivalent RT bronchodilator orders with one order with BID Frequency and one order with Frequency of every 4 hours PRN wheezing or increased work of breathing using Per Protocol order mode. 7-10 - enter or revise RT Bronchodilator order(s) to two equivalent RT bronchodilator orders with one order with TID Frequency and one order with Frequency of every 4 hours PRN wheezing or increased work of breathing using Per Protocol order mode. 11-13 - enter or revise RT Bronchodilator order(s) to one equivalent RT bronchodilator order with QID Frequency and an Albuterol order with Frequency of every 4 hours PRN wheezing or increased work of breathing using Per Protocol order mode. Greater than 13 - enter or revise RT Bronchodilator order(s) to one equivalent RT bronchodilator order with every 4 hours Frequency and an Albuterol order with Frequency of every 2 hours PRN wheezing or increased work of breathing using Per Protocol order mode. RT to enter RT Home Evaluation for COPD & MDI Assessment order using Per Protocol order mode.     Electronically signed by Ran Carlson RCP on 5/11/2022 at 7:33 AM

## 2022-05-11 NOTE — PROGRESS NOTES
Patient IV removed per discharge order; catheter intact; patient tolerated well. Patient discharge instructions gone over at this time; questions answered patient verbalizes understanding. Prescription for iron sent to [facility]; patient instructed on medication and how to take. Belongings gathered. Instructed patient to let this RN know when ride was here to leave. Call light within reach.

## 2022-05-12 ENCOUNTER — CARE COORDINATION (OUTPATIENT)
Dept: CASE MANAGEMENT | Age: 67
End: 2022-05-12

## 2022-05-12 ENCOUNTER — CARE COORDINATION (OUTPATIENT)
Dept: CARE COORDINATION | Age: 67
End: 2022-05-12

## 2022-05-12 ENCOUNTER — TELEPHONE (OUTPATIENT)
Dept: FAMILY MEDICINE CLINIC | Age: 67
End: 2022-05-12

## 2022-05-12 DIAGNOSIS — N17.9 AKI (ACUTE KIDNEY INJURY) (HCC): Primary | ICD-10-CM

## 2022-05-12 LAB — AFP: 4.6 UG/L

## 2022-05-12 PROCEDURE — 1111F DSCHRG MED/CURRENT MED MERGE: CPT | Performed by: FAMILY MEDICINE

## 2022-05-12 NOTE — PROGRESS NOTES
Physician Progress Note      PATIENT:               Araseli Lao  CSN #:                  [id number]  :                       1955  ADMIT DATE:       2022 5:51 PM  [address] DATE:        2022 3:17 PM  RESPONDING  PROVIDER #:        Dontrell Rosa MD          QUERY TEXT:    Patient admitted with GI bleeding with ABLA and iron deficiency anemia. S/p   EGD  On  which showed mild antral gastritis and grade 1 esophageal varices. S/p colonoscopy on 5/10 which showed mild sigmoid diverticulosis. If   possible, please document  the suspected cause of the GI bleeding: The medical record reflects the following:  Risk Factors: age, liver cirrhosis, alcohol use  Clinical Indicators:  S/p EGD  On  which showed mild antral gastritis and   grade 1 esophageal varices. S/p colonoscopy on 5/10 which showed mild sigmoid   diverticulosis, ABLA and iron deficiency anemia both documented  Treatment: gi consult, EGD/colonoscopy, tele, trended h/p, PPI, IV iron, plan   for capsule endoscopy OP    Thank you for your assistance,  Jane Whelan RN,BSN,CCDS,CRCR  Options provided:  -- GI bleeding suspected due to antral gastritis  -- GI bleeding suspected due to esophageal varices from liver cirrhosis  -- GI bleeding suspected due to sigmoid diverticulitis  -- GI bleeding suspected cause is multifactorial related to antral gastritis,   esophageal varices from liver cirrhosis, and sigmoid diverticulitis. -- GI bleeding and ABLA ruled out  -- GI bleeding suspected cause, please specify cause. -- Other - I will add my own diagnosis  -- Disagree - Not applicable / Not valid  -- Disagree - Clinically unable to determine / Unknown  -- Refer to Clinical Documentation Reviewer    PROVIDER RESPONSE TEXT:    GI bleeding suspected cause is multifactorial related to antral gastritis,   esophageal varices from liver cirrhosis, and sigmoid diverticulitis.     Query created by: Fady Saucedo on 2022 10:20 AM      Electronically signed by:  Carey Torres MD 5/12/2022 10:38 AM

## 2022-05-12 NOTE — TELEPHONE ENCOUNTER
[facility] Transitions Initial Follow Up Call    Outreach made within 2 business days of discharge: Yes    Patient: Flavio Mariee Patient : 1955   MRN: [id number]  Reason for Admission: There are no discharge diagnoses documented for the most recent discharge. Discharge Date: 22       Spoke with: Claribel Naidu    Discharge department/facility: [facility]    Non-face-to-face services provided:  Scheduled appointment with PCP-22 @ 3:00    [facility] Interactive Patient Contact:  Was patient able to fill all prescriptions: Yes  Was patient instructed to bring all medications to the follow-up visit: Yes  Is patient taking all medications as directed in the discharge summary?  Yes  Does patient understand their discharge instructions: Yes  Does patient have questions or concerns that need addressed prior to 7-14 day follow up office visit: no    Scheduled appointment with PCP within 7-14 days    Follow Up  Future Appointments   Date Time Provider Alina Friend   2022  3:00 PM Sherryle Fort, MD [facility] [place] - [facility]   2022 11:20 AM Sherryle Fort, MD [facility] [place] - DYARYAN   2022  2:00 PM [facility] Pravin Rad   2022  3:00 PM Cesar Nuñez MD [facility] MAUREEN Romo RN

## 2022-05-12 NOTE — CARE COORDINATION
[facility] Transitions Initial Follow Up Call    Call within 2 business days of discharge: Yes    Patient: Stacy Boateng Patient : 1955   MRN: [id number]  Reason for Admission: GIB, acute blood loss anemia, iron deficiency anemia, s/p EGD with mild antral gastritis and esophageal varices, s/p colonoscopy with mild sigmoid diverticulosis, hx alcoholic cirrhosis, chronic hypoxic resp failure, COPD on home oxygen 2.5lpm, MICHAEL, leukocytosis, DM, HTN, HLD -> home no services, f/up with GI for capsule endoscopy  Discharge Date: 22 RARS: Readmission Risk Score: 17.4 ( )    Last Discharge [facility]       Complaint Diagnosis Description Type Department Provider    22   Admission (Discharged) [facility] 2 Gertrude Muñoz MD           Spoke with: Stacy Boateng (patient)    Facility: [facility]    Non-face-to-face services provided:  Obtained and reviewed discharge summary and/or continuity of care documents  Education of patient/family/caregiver/guardian to support self-management-fluid restrictions, low salt diet  Assessment and support for treatment adherence and medication management-[id number] completed    Was this an external facility discharge? No Discharge Facility: NA    Challenges to be reviewed by the provider   Additional needs identified to be addressed with provider: Yes  medications-needs new Narcan rx to pharmacy - he threw his away ([facility] ([facility]       Method of communication with provider : chart routing    Advance Care Planning:   Does patient have an Advance Directive: decision maker updated. Care Transition Nurse (CTN) contacted the patient by telephone to perform post hospital discharge assessment. Verified name and  with patient as identifiers. Provided introduction to self, and explanation of the CTN role. CTN reviewed discharge instructions, medical action plan and red flags with patient who verbalized understanding.  Patient given an opportunity to ask questions and does not have any further questions or concerns at this time. Were discharge instructions available to patient? Yes. Reviewed appropriate site of care based on symptoms and resources available to patient including: PCP  Specialist. The patient agrees to contact the PCP office for questions related to their healthcare. Medication reconciliation was performed with patient, who verbalizes understanding of administration of home medications. Was patient discharged with a pulse oximeter? He has one. CTN discussed pulse ox goals and when to notify provider or seek emergency care. Will  iron today. Advised to add stool softener to prevent constipation. States he takes stool softener since he is on chronic opioid. He does not know name of what he takes and he is still in bed at this time. States he has not had BM since colonoscopy prep. SaO2 94% on 3lpm. Does not have a scale for daily weights. Denies LE edema. Using breathing tools from RT at home to cough up mucous - it is yellow in color but clearing and decreasing in volume. Hasn't checked BS yet. He doesn't have teeth and states his gums are sore. They are not bleeding, he does not see irritation, redness, edema or blister. His throat is not sore, just his gums. States he was chewing ice a lot inpatient while NPO. He will monitor and if not improved by tomorrow call PCP or have eval at appt on Tuesday. CTN also recommended chloraseptic spray to  OTC at [facility] for prn use. His Lasix was stopped at discharge. He was educated to discontinue this and to monitor for LE edema, increased SOB, weight gain, abd distention/fullness, cough when lying down. He was educated to avoid using salt and to not eat foods known high in salt (specifically called out deli meats, freezer meals, canned soups and other canned goods, Big Boys, Big Macs, [place], Daren). Instructed to keep fluid intake to 48-64 ounces/day. He voices understanding.     He declines referrals including skilled HC. States he can afford meds. Has no issues with transportation. His brother, sister and neighbor assist as needed. call ended so he could answer an incoming call. States he plans to call GI today as instructed. CTN provided contact information. Plan for follow-up call in 5-7 days based on severity of symptoms and risk factors.     Plan for next call: follow up appointment-TCM visit 5/17 with PCP    Care Transitions 24 Hour Call    Schedule Follow Up Appointment with PCP: Completed  Do you have a copy of your discharge instructions?: Yes  Do you have all of your prescriptions and are they filled?: Yes  Have you been contacted by a [facility] Pharmacist?: No  Have you scheduled your follow up appointment?: Yes  How are you going to get to your appointment?: Car - family or friend to transport  Patient Home Equipment: Nebulizer  Do you have support at home?: Alone  Do you feel like you have everything you need to keep you well at home?: Yes  Are you an active caregiver in your home?: No  Care Transitions Interventions  No Identified Needs         Follow Up  Future Appointments   Date Time Provider Alina Friend   5/17/2022  3:00 PM MD [facility] [place] [facility]   7/11/2022 11:20 AM MD Edwin Acharya [place] [facility]   11/2/2022  2:00 PM Mine Sanon   11/2/2022  3:00 PM Sil Maher MD [facility]       Bria Vila RN

## 2022-05-17 ENCOUNTER — OFFICE VISIT (OUTPATIENT)
Dept: FAMILY MEDICINE CLINIC | Age: 67
End: 2022-05-17
Payer: MEDICARE

## 2022-05-17 VITALS
BODY MASS INDEX: 35.71 KG/M2 | SYSTOLIC BLOOD PRESSURE: 131 MMHG | DIASTOLIC BLOOD PRESSURE: 57 MMHG | HEART RATE: 88 BPM | WEIGHT: 228 LBS | OXYGEN SATURATION: 98 %

## 2022-05-17 DIAGNOSIS — K29.70 GASTRITIS, PRESENCE OF BLEEDING UNSPECIFIED, UNSPECIFIED CHRONICITY, UNSPECIFIED GASTRITIS TYPE: ICD-10-CM

## 2022-05-17 DIAGNOSIS — I85.10 SECONDARY ESOPHAGEAL VARICES WITHOUT BLEEDING (HCC): ICD-10-CM

## 2022-05-17 DIAGNOSIS — Z09 HOSPITAL DISCHARGE FOLLOW-UP: Primary | ICD-10-CM

## 2022-05-17 DIAGNOSIS — D50.9 IRON DEFICIENCY ANEMIA, UNSPECIFIED IRON DEFICIENCY ANEMIA TYPE: ICD-10-CM

## 2022-05-17 DIAGNOSIS — J41.8 MIXED SIMPLE AND MUCOPURULENT CHRONIC BRONCHITIS (HCC): ICD-10-CM

## 2022-05-17 DIAGNOSIS — K75.81 LIVER CIRRHOSIS SECONDARY TO NASH (HCC): ICD-10-CM

## 2022-05-17 DIAGNOSIS — D64.9 ANEMIA, UNSPECIFIED TYPE: ICD-10-CM

## 2022-05-17 DIAGNOSIS — K29.60 EROSIVE GASTRITIS: ICD-10-CM

## 2022-05-17 DIAGNOSIS — K92.2 GASTROINTESTINAL HEMORRHAGE, UNSPECIFIED GASTROINTESTINAL HEMORRHAGE TYPE: ICD-10-CM

## 2022-05-17 DIAGNOSIS — K74.60 LIVER CIRRHOSIS SECONDARY TO NASH (HCC): ICD-10-CM

## 2022-05-17 PROCEDURE — 36415 COLL VENOUS BLD VENIPUNCTURE: CPT | Performed by: FAMILY MEDICINE

## 2022-05-17 PROCEDURE — 1111F DSCHRG MED/CURRENT MED MERGE: CPT | Performed by: FAMILY MEDICINE

## 2022-05-17 PROCEDURE — 99495 TRANSJ CARE MGMT MOD F2F 14D: CPT | Performed by: FAMILY MEDICINE

## 2022-05-17 ASSESSMENT — PATIENT HEALTH QUESTIONNAIRE - PHQ9
SUM OF ALL RESPONSES TO PHQ QUESTIONS 1-9: 0
SUM OF ALL RESPONSES TO PHQ QUESTIONS 1-9: 0
SUM OF ALL RESPONSES TO PHQ9 QUESTIONS 1 & 2: 0
SUM OF ALL RESPONSES TO PHQ QUESTIONS 1-9: 0
1. LITTLE INTEREST OR PLEASURE IN DOING THINGS: 0
SUM OF ALL RESPONSES TO PHQ QUESTIONS 1-9: 0
2. FEELING DOWN, DEPRESSED OR HOPELESS: 0

## 2022-05-17 NOTE — PROGRESS NOTES
Post-Discharge Transitional Care Management Services      Silvina Rowan   YOB: 1955    Date of Visit:  5/17/2022  30 Day Post-Discharge Date:     Allergies   Allergen Reactions    Lisinopril Swelling    Ace Inhibitors Swelling    Influenza Vaccines      He states he was hospitalized when he received his first flu vaccine    Spiriva Handihaler [Tiotropium Bromide Monohydrate] Swelling     Tolerates both tudorza and incruse    Vilanterol      Outpatient Medications Marked as Taking for the 5/17/22 encounter (Office Visit) with Conner Gu MD   Medication Sig Dispense Refill    ferrous sulfate (IRON 325) 325 (65 Fe) MG tablet Take 1 tablet by mouth daily (with breakfast) 30 tablet 1    ipratropium-albuterol (DUONEB) 0.5-2.5 (3) MG/3ML SOLN nebulizer solution Inhale 3 mLs into the lungs every 4 hours 360 mL 0    CARTIA  MG extended release capsule TAKE 2 CAPSULES ONCE DAILY 60 capsule 0    albuterol sulfate  (90 Base) MCG/ACT inhaler INHALE 2 PUFFS EVERY 6 HOURS AS NEEDED FOR WHEEZING 18 g 0    pantoprazole (PROTONIX) 40 MG tablet TAKE ONE TABLET BY MOUTH EVERY MORNING BEFORE BREAKFAST 30 tablet 5    LANTUS SOLOSTAR 100 UNIT/ML injection pen INJECT 25 UNITS INTO THE SKIN DAILY WITH SUPPER (Patient taking differently: Taking 15 units HS (noted 5/12/2022)) 15 mL 0    TRUEplus Lancets 30G MISC TEST 2 TIMES DAILY 100 each 5    TRUE METRIX BLOOD GLUCOSE TEST strip USE 1 STRIP IN VITRO ROUTE 2 TIMES DAILY 50 strip 0    allopurinol (ZYLOPRIM) 300 MG tablet TAKE 1 TABLET BY MOUTH DAILY 30 tablet 5    atorvastatin (LIPITOR) 40 MG tablet TAKE 1 TABLET BY MOUTH DAILY 30 tablet 5    DULoxetine (CYMBALTA) 60 MG extended release capsule TAKE ONE CAPSULE BY MOUTH EVERY DAY 30 capsule 5    thiamine 100 MG tablet TAKE 1 TABLET BY MOUTH DAILY 100 tablet 1    fluticasone-umeclidin-vilant (TRELEGY ELLIPTA) 100-62.5-25 MCG/INH AEPB Inhale 1 puff into the lungs daily 1 each 5  PENTIPS 31G X 8 MM MISC USE ONCE DAILY WITH INJECTION 290 each 10    folic acid (FOLVITE) 1 MG tablet TAKE 1 TABLET BY MOUTH DAILY 30 tablet 5    metoprolol succinate (TOPROL XL) 50 MG extended release tablet TAKE ONE TABLET BY MOUTH EVERY DAY 30 tablet 5    ondansetron (ZOFRAN) 4 MG tablet Take 1 tablet by mouth every 6 hours as needed for Nausea or Vomiting 30 tablet 5    Alcohol Swabs (ALCOHOL PREP) 70 % PADS USE AS DIRECTED 100 each 5    oxyCODONE (ROXICODONE) 5 MG immediate release tablet Take 10 mg by mouth every 8 hours as needed for Pain. Taking 0.5 tablet (10mg) q8h prn (noted 5/12/2022)      naloxone (NARCAN) 4 MG/0.1ML LIQD nasal spray 1 spray by Nasal route as needed for Opioid Reversal May repeat after 3 minutes if no or minimal response as directed       gabapentin (NEURONTIN) 300 MG capsule Take 900 mg by mouth 3 times daily.  Blood Pressure Monitoring (BLOOD PRESSURE CUFF) MISC Please dispense insurance preference 1 each 0    Blood Glucose Monitoring Suppl (FREESTYLE FREEDOM LITE) w/Device KIT 1 kit by Does not apply route daily 1 kit 0    OXYGEN Inhale 3 L/min into the lungs nightly as needed (and daily prn)            Vitals:    05/17/22 1500   BP: (!) 131/57   Pulse: 88   SpO2: 98%   Weight: 228 lb (103.4 kg)     Body mass index is 35.71 kg/m². Wt Readings from Last 3 Encounters:   05/17/22 228 lb (103.4 kg)   05/08/22 228 lb 2.8 oz (103.5 kg)   05/05/22 229 lb (103.9 kg)     BP Readings from Last 3 Encounters:   05/17/22 (!) 131/57   05/11/22 127/66   05/10/22 (!) 126/101        Patient was admitted to [facility] from 5-8-22 to 5-11-22 for SOB, GI Bleed. Inpatient course: Discharge summary reviewed- see chart. Current status: Short of breath and fatigue    Review of Systems:  A comprehensive review of systems was negative except for what was noted in the HPI.     Physical Exam:  General Appearance: alert and oriented to person, place and time, well developed and well- nourished, in no acute distress  Skin: warm and dry, no rash or erythema  Head: normocephalic and atraumatic  Eyes: pupils equal, round, and reactive to light, extraocular eye movements intact, conjunctivae normal  ENT: tympanic membrane, external ear and ear canal normal bilaterally, nose without deformity, nasal mucosa and turbinates normal without polyps  Neck: supple and non-tender without mass, no thyromegaly or thyroid nodules, no cervical lymphadenopathy  Pulmonary/Chest: clear to auscultation bilaterally- no wheezes, rales or rhonchi, normal air movement, no respiratory distress  Cardiovascular: normal rate, regular rhythm, normal S1 and S2, 2/6 murmur, rubs, clicks, or gallops, distal pulses intact, no carotid bruits  Abdomen: soft, non-tender, non-distended, normal bowel sounds, no masses or organomegaly  Extremities: no cyanosis, clubbing or edema  Musculoskeletal: normal range of motion, no joint swelling, deformity or tenderness  Neurologic: reflexes normal and symmetric, no cranial nerve deficit, gait, coordination and speech normal    Initial post-discharge communication occurred between nurse and patient on 5-12-22- see documentation in chart: telephone encounter. Assessment/Plan:  Bebo Ogden was seen today for follow-up from hospital.    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   He will be having a camera capsule study. Hemoglobin at discharge 8.3. Update CBC. Continue iron. Continue Protonix 40 mg twice a day x1 month. GI follows the cirrhosis. COPD at baseline, I told him his breathing was worse related to the anemia. Follow-up regular appointment with me in July. Patient should call the office immediately with new or ongoing signs or symptoms or worsening, or proceed to the emergency room. No changes in past medical history, past surgical history, social history, or family history were noted during the patient encounter unless specifically listed above. All updates of past medical history, past surgical history, social history, or family history were reviewed personally by me during the office visit. All problems listed in the assessment are stable unless noted otherwise. Medication profile reviewed personally by me during the visit. Medication side effects and possible impairments from medications were discussed as applicable. This document was prepared by a combination of typing and transcription through a voice recognition software.       Diagnostic test results reviewed: inpatient labs and GI procedure-EGD and colonoscopy    Patient risk of morbidity and mortality: moderate    Medical Decision Making: moderate complexity

## 2022-05-17 NOTE — PATIENT INSTRUCTIONS

## 2022-05-18 LAB
BANDED NEUTROPHILS RELATIVE PERCENT: 3 % (ref 0–7)
BASOPHILS ABSOLUTE: 0.1 K/UL (ref 0–0.2)
BASOPHILS RELATIVE PERCENT: 1 %
EOSINOPHILS ABSOLUTE: 0.1 K/UL (ref 0–0.6)
EOSINOPHILS RELATIVE PERCENT: 1 %
HCT VFR BLD CALC: 28.1 % (ref 40.5–52.5)
HEMATOLOGY PATH CONSULT: NO
HEMOGLOBIN: 8.8 G/DL (ref 13.5–17.5)
HYPOCHROMIA: ABNORMAL
LYMPHOCYTES ABSOLUTE: 1.9 K/UL (ref 1–5.1)
LYMPHOCYTES RELATIVE PERCENT: 22 %
MCH RBC QN AUTO: 22.3 PG (ref 26–34)
MCHC RBC AUTO-ENTMCNC: 31.3 G/DL (ref 31–36)
MCV RBC AUTO: 71.3 FL (ref 80–100)
MICROCYTES: ABNORMAL
MONOCYTES ABSOLUTE: 0.4 K/UL (ref 0–1.3)
MONOCYTES RELATIVE PERCENT: 4 %
NEUTROPHILS ABSOLUTE: 6.3 K/UL (ref 1.7–7.7)
NEUTROPHILS RELATIVE PERCENT: 69 %
PDW BLD-RTO: 19.4 % (ref 12.4–15.4)
PLATELET # BLD: 263 K/UL (ref 135–450)
PMV BLD AUTO: 8 FL (ref 5–10.5)
POLYCHROMASIA: ABNORMAL
RBC # BLD: 3.94 M/UL (ref 4.2–5.9)
RBC # BLD: ABNORMAL 10*6/UL
WBC # BLD: 8.8 K/UL (ref 4–11)

## 2022-05-19 ENCOUNTER — CARE COORDINATION (OUTPATIENT)
Dept: CASE MANAGEMENT | Age: 67
End: 2022-05-19

## 2022-05-19 NOTE — CARE COORDINATION
[facility] Transitions Follow Up Call    2022    Patient: Lynelle Collet  Patient : 1955   MRN: [id number]  Reason for Admission: GIB, acute blood loss anemia, iron deficiency anemia, s/p EGD with mild antral gastritis and esophageal varices, s/p colonoscopy with mild sigmoid diverticulosis, hx alcoholic cirrhosis, chronic hypoxic resp failure, COPD on home oxygen 2.5lpm, MICHAEL, leukocytosis, DM, HTN, HLD -> home no services, f/up with GI for capsule endoscopy  Discharge Date: 22 RARS: Readmission Risk Score: 17.4 ( )         Spoke with: Juan José Rock  Patient stated he is doing better. Appetite and fluid intake is good. No bladder or bowel elimination problem. He denies dark stools, abdominal pain, nvd, fever, chills, cp, weakness or fatigue. He has a little swelling in his ankles. Patient visited with his PCP everything was ok. He had blood drawn for labs. His FBS today was 139. Taking medications as prescribed. No questions, needs or concerns voiced. Will continue to follow. Care Transitions Follow Up Call    Needs to be reviewed by the provider   Additional needs identified to be addressed with provider: No  none             Method of communication with provider : none      Care Transition Nurse (CTN) contacted the patient by telephone to follow up after admission on 2022. Verified name and  with patient as identifiers. Addressed changes since last contact: none  Discussed follow-up appointments. If no appointment was previously scheduled, appointment scheduling offered: No.   Is follow up appointment scheduled within 7 days of discharge? Yes. Advance Care Planning:   Does patient have an Advance Directive: not on file     CTN reviewed discharge instructions, medical action plan and red flags with patient and discussed any barriers to care and/or understanding of plan of care after discharge.  Discussed appropriate site of care based on symptoms and resources available to patient including: PCP  Specialist. The patient agrees to contact the PCP office for questions related to their healthcare. Patients top risk factors for readmission: ineffective coping  medical condition-DM,COPD, Anemia, liver disease  Interventions to address risk factors: Education of patient/family/caregiver/guardian to support self-management--      Non-[facility] follow up appointment(s):     CTN provided contact information for future needs. Plan for follow-up call in 7-10 days based on severity of symptoms and risk factors. Plan for next call: patient overall status          Care Transitions Subsequent and Final Call    Subsequent and Final Calls  Care Transitions Interventions  Other Interventions:            Follow Up  Future Appointments   Date Time Provider Alina Friend   5/20/2022  4:00 PM [facility] MRI RM 1 [facility] MRI Anisa Walsh Rad   7/11/2022 11:20 AM Sabiha Farrell MD [facility]  [place] - [place]YD   11/2/2022  2:00 PM MHC CT MAIN MHCZ CT [facility] [place] Rad   11/2/2022  3:00 PM Jeffrey Pereira MD [facility] PUL MAUREEN Blandon LPN

## 2022-05-20 ENCOUNTER — HOSPITAL ENCOUNTER (OUTPATIENT)
Dept: MRI IMAGING | Age: 67
Discharge: HOME | End: 2022-05-20
Payer: MEDICARE

## 2022-05-20 DIAGNOSIS — M54.12 CERVICAL RADICULAR PAIN: ICD-10-CM

## 2022-05-20 PROCEDURE — 72141 MRI NECK SPINE W/O DYE: CPT

## 2022-05-24 ENCOUNTER — HOSPITAL ENCOUNTER (OUTPATIENT)
Age: 67
Discharge: HOME | End: 2022-05-24
Payer: MEDICARE

## 2022-05-24 LAB
A/G RATIO: 0.8 (ref 1.1–2.2)
ALBUMIN SERPL-MCNC: 3.2 G/DL (ref 3.4–5)
ALP BLD-CCNC: 216 U/L (ref 40–129)
ALT SERPL-CCNC: 17 U/L (ref 10–40)
ANION GAP SERPL CALCULATED.3IONS-SCNC: 10 MMOL/L (ref 3–16)
AST SERPL-CCNC: 14 U/L (ref 15–37)
BASOPHILS ABSOLUTE: 0.1 K/UL (ref 0–0.2)
BASOPHILS RELATIVE PERCENT: 1.2 %
BILIRUB SERPL-MCNC: 0.3 MG/DL (ref 0–1)
BUN BLDV-MCNC: 7 MG/DL (ref 7–20)
CALCIUM SERPL-MCNC: 8.1 MG/DL (ref 8.3–10.6)
CHLORIDE BLD-SCNC: 98 MMOL/L (ref 99–110)
CO2: 30 MMOL/L (ref 21–32)
CREAT SERPL-MCNC: 0.9 MG/DL (ref 0.8–1.3)
EOSINOPHILS ABSOLUTE: 0.1 K/UL (ref 0–0.6)
EOSINOPHILS RELATIVE PERCENT: 2 %
GFR AFRICAN AMERICAN: >60
GFR NON-AFRICAN AMERICAN: >60
GLUCOSE BLD-MCNC: 228 MG/DL (ref 70–99)
HCT VFR BLD CALC: 31 % (ref 40.5–52.5)
HEMOGLOBIN: 9.7 G/DL (ref 13.5–17.5)
LYMPHOCYTES ABSOLUTE: 1.1 K/UL (ref 1–5.1)
LYMPHOCYTES RELATIVE PERCENT: 15.8 %
MCH RBC QN AUTO: 23 PG (ref 26–34)
MCHC RBC AUTO-ENTMCNC: 31.2 G/DL (ref 31–36)
MCV RBC AUTO: 73.9 FL (ref 80–100)
MONOCYTES ABSOLUTE: 0.4 K/UL (ref 0–1.3)
MONOCYTES RELATIVE PERCENT: 5.7 %
NEUTROPHILS ABSOLUTE: 5.2 K/UL (ref 1.7–7.7)
NEUTROPHILS RELATIVE PERCENT: 75.3 %
PDW BLD-RTO: 28.3 % (ref 12.4–15.4)
PLATELET # BLD: 280 K/UL (ref 135–450)
PMV BLD AUTO: 6.8 FL (ref 5–10.5)
POTASSIUM SERPL-SCNC: 3.4 MMOL/L (ref 3.5–5.1)
RBC # BLD: 4.2 M/UL (ref 4.2–5.9)
SODIUM BLD-SCNC: 138 MMOL/L (ref 136–145)
TOTAL PROTEIN: 7.1 G/DL (ref 6.4–8.2)
WBC # BLD: 7 K/UL (ref 4–11)

## 2022-05-24 PROCEDURE — 80053 COMPREHEN METABOLIC PANEL: CPT

## 2022-05-24 PROCEDURE — 85025 COMPLETE CBC W/AUTO DIFF WBC: CPT

## 2022-05-24 PROCEDURE — 36415 COLL VENOUS BLD VENIPUNCTURE: CPT

## 2022-05-26 ENCOUNTER — CARE COORDINATION (OUTPATIENT)
Dept: CASE MANAGEMENT | Age: 67
End: 2022-05-26

## 2022-05-26 NOTE — CARE COORDINATION
[facility] Transitions Follow Up Call    2022    Patient: Cali Cespedes  Patient : 1955   MRN: [id number]  Reason for Admission: GIB, acute blood loss anemia, iron deficiency anemia, s/p EGD with mild antral gastritis and esophageal varices, s/p colonoscopy with mild sigmoid diverticulosis, hx alcoholic cirrhosis, chronic hypoxic resp failure, COPD on home oxygen 2.5lpm, MICHAEL, leukocytosis, DM, HTN, HLD -> home no services, f/up with GI for capsule endoscopy  Discharge Date: 22 RARS: Readmission Risk Score: 17.4 ( )       Spoke with: Cali Cespedes (patient)    Needs to be reviewed by the provider   Additional needs identified to be addressed with provider: no         Method of communication with provider : none    Care Transition Nurse (CTN) contacted the patient by telephone to follow up after recent hospital admission. Addressed changes since last contact: none  Discussed follow-up appointments. If no appointment was previously scheduled, appointment scheduling offered: no.   Non-[facility] follow up appointment(s): NA      Discussed appropriate site of care based on symptoms and resources available to patient including: PCP  Specialist. The patient agrees to contact the PCP office for questions related to their healthcare. Patients top risk factors for readmission: medical condition-see above dx  Interventions to address risk factors: Education of patient/family/caregiver/guardian to support self-management-education    Feels well. Tolerating iron. Stool is black from it. Denies constipation - has BM 2-3 times daily. States LE \"hurt\" but no edema. Feels it is from chronic back pain. He just got off phone with pain management because he recently had MRI and expects to get scheduled for injection. Gums are no longer sore. Does not have scale. Usually cooks his own food and does not add salt. Denies needs at this time. His son visits and helps prn. Expects him later today to clean. CTN provided contact information. Plan for follow-up call in 5-7 days based on severity of symptoms and risk factors.   Plan for next call: self management-      Follow Up  Future Appointments   Date Time Provider Alina Ronna   7/11/2022 11:20 AM [facility] [place] [facility]   11/2/2022  2:00 PM MHC CT MAIN MHC[facility] CT [facility] Rad   11/2/2022  3:00 PM Moe Spears MD [facility] P[facility]       Addi Prajapati RN

## 2022-06-02 ENCOUNTER — CARE COORDINATION (OUTPATIENT)
Dept: CASE MANAGEMENT | Age: 67
End: 2022-06-02

## 2022-06-02 NOTE — CARE COORDINATION
[facility] Transitions Follow Up Call    2022    Patient: Ami Roger  Patient : 1955   MRN: [id number]  Reason for Admission: GIB, acute blood loss anemia, iron deficiency anemia, s/p EGD with mild antral gastritis and esophageal varices, s/p colonoscopy with mild sigmoid diverticulosis, hx alcoholic cirrhosis, chronic hypoxic resp failure, COPD on home oxygen 2.5lpm, MICHAEL, leukocytosis, DM, HTN, HLD -> home no services, f/up with GI for capsule endoscopy  Discharge Date: 22 RARS: Readmission Risk Score: 17.4 ( )       Spoke with: Bianca Banda (patient)    Needs to be reviewed by the provider   Additional needs identified to be addressed with provider: Yes  still needs to be seen by GI for capsule endoscopy         Method of communication with provider : CTN contacted [facility] office of Dr Jose A Garcia Transition Nurse (CTN) contacted the patient by telephone to follow up after recent hospital admission. Addressed changes since last contact: none  Discussed follow-up appointments. If no appointment was previously scheduled, appointment scheduling offered: yes - CTN contacted [facility] office of Dr John Rocha about f/up appt and capsule endoscopy   Non-[facility] follow up appointment(s): GI - TBD    Discussed appropriate site of care based on symptoms and resources available to patient including: PCP  Specialist. The patient agrees to contact the PCP office for questions related to their healthcare. Patients top risk factors for readmission: medical condition-see dx above  Interventions to address risk factors: Education of patient/family/caregiver/guardian to support self-management-s/s monitor; get capsule endoscopy; schedule with GI for 4 week f/up    Feels baseline. States he is laying down with oxygen on at 2.5lpm and maintaining around 95%. Cough with yellow sputum same. Denies fever, chills. Tolerating PO without n/v/d. Denies s/s bleeding - bright red or black tarry.  Has not heard back from GI office to schedule capsule endoscopy and 4 week OV. CTN will contact GI office to see what is needed to get him scheduled. He is in agreement with plan. Denies needs at this time. CTN provided contact information. CTN contacted GI office and initially waited on hold for >5 minutes when automated system estimated the wait time of additional 15 minutes. CTN called back choosing option for \"physician's office or hospital\" and spoke with Vitaly Elizabeth. Vitaly Elizabeth can see documentation back and forth between their staff. She tried to connect call to [place] or  and after long hold someone answered and then disconnected call. CTN called back and connected to [employer]. States she has tried to call him twice to have him come to office to have capsule endoscopy but he didn't want to come to their office on [address] and she still need to see if this service can be scheduled at [facility]. She states she will followup and call him to schedule. CTN notes there are [facility] GI providers at [facility] if care needs to be transferred. CTN will follow up with patient next week to discuss plan. Plan for follow-up call in 5-7 days based on severity of symptoms and risk factors. Plan for next call: capsule endoscopy and GI appt?       Follow Up  Future Appointments   Date Time Provider Alina Friend   7/11/2022 11:20 AM MD Edwin Ocampo  [place] - [place]YD   11/2/2022  2:00 PM MHC CT MAIN [facility] CT [place] Rad   11/2/2022  3:00 PM Brendan Valentin MD [facility] P[facility]       Hussein Jiménez RN

## 2022-06-08 ENCOUNTER — CARE COORDINATION (OUTPATIENT)
Dept: CASE MANAGEMENT | Age: 67
End: 2022-06-08

## 2022-06-08 ENCOUNTER — TELEPHONE (OUTPATIENT)
Dept: PULMONOLOGY | Age: 67
End: 2022-06-08

## 2022-06-08 DIAGNOSIS — I10 BENIGN ESSENTIAL HTN: ICD-10-CM

## 2022-06-08 DIAGNOSIS — I10 UNCONTROLLED HYPERTENSION: ICD-10-CM

## 2022-06-08 RX ORDER — METOPROLOL SUCCINATE 50 MG/1
TABLET, EXTENDED RELEASE ORAL
Qty: 30 TABLET | Refills: 5 | Status: SHIPPED | OUTPATIENT
Start: 2022-06-08

## 2022-06-08 RX ORDER — DOXYCYCLINE HYCLATE 100 MG
100 TABLET ORAL 2 TIMES DAILY
Qty: 14 TABLET | Refills: 0 | Status: SHIPPED | OUTPATIENT
Start: 2022-06-08 | End: 2022-06-15

## 2022-06-08 RX ORDER — PREDNISONE 10 MG/1
TABLET ORAL
Qty: 30 TABLET | Refills: 0 | Status: SHIPPED | OUTPATIENT
Start: 2022-06-08 | End: 2022-06-20

## 2022-06-08 NOTE — TELEPHONE ENCOUNTER
Care Transition Nurse (CTN) contacted the patient by telephone to follow up after recent hospital admission.      He never heard from GI office about capsule endoscopy. States he is home sick. states he is having a hard time breathing - started 4 days ago. He did not call his PCP or pulm because he was just in hospital for same symptoms. Educated him that when symptoms like this start he needs to call Dr Ezekiel Gallardo to report for recommendation to avoid a medical emergency and readmission. He voices understanding. Wearing oxygen at 3lpm - he turned it up earlier today. Checked SaO2 during call and 93-94% on 3lpm. CTN provided contact information.      Do you have the following symptoms? Shortness of Breath: yes   Wheezing: yes - using albuterol hhn 2-3 times daily  Cough: yes     Cough Characteristics:  Productive: yes  Sputum Color: yellow  Hemoptysis: no  Consistency of sputum: thick     Fever: denies                                                 Temp: WNL  Chills/Sweats: sweats     What other symptoms are you having?:  none     How long have you had these symptoms? started 4 days ago   [address] [place] [address]      Have you been vaccinated for covid?  no COVID or flu vaccines      Review medications and allergies: Allergies? yes        Currently on Antibiotics? (Drug/Dose/Frequency and how long on?) no        Systemic Steroids? (Drug/Dose/Frequency and how long on?) no      Last sick call taken on 2/26/2021. Meds prescribed were prednisone and doxycycline, by dr. Mamadou Dejesus.     Last OV 11/4/2021 with Dr. Everett Key:  · Severe COPD       Not addressed today   · Peripheral vascular disease s/p bypass surgery  · Obesity  · Cirrhosis  · Tobacco abuse, 1-3 ppd X 42 years, average about 2 ppd for 80 pack year hx, now 1/2 ppd     Plan:   · Incruse daily, did not tolerate Anoro, try adding flovent 110 BID today   · PRN albuterol  · LDCT for LCS in October 2022, see me after  · COVID vaccine previously recommended

## 2022-06-08 NOTE — TELEPHONE ENCOUNTER
I sent in doxy and prednisone and will ask the care transition mgr to f/u with the patient this evening and tomorrow. Please notify pt of prescriptions. emergency department with any worsening.

## 2022-06-08 NOTE — CARE COORDINATION
[facility] Transitions Follow Up Call    2022    Patient: Sebastián Lal  Patient : 1955   MRN: [id number]  Reason for Admission: GIB, acute blood loss anemia, iron deficiency anemia, s/p EGD with mild antral gastritis and esophageal varices, s/p colonoscopy with mild sigmoid diverticulosis, hx alcoholic cirrhosis, chronic hypoxic resp failure, COPD on home oxygen 2.5lpm, MICHAEL, leukocytosis, DM, HTN, HLD -> home no services, f/up with GI for capsule endoscopy  Discharge Date: 22 RARS: Readmission Risk Score: 17.4 ( )       Spoke with: Sebastián Lal (patient)    Needs to be reviewed by the provider   Additional needs identified to be addressed with provider: Yes  see below         Method of communication with provider : phone outreach to Dr Boby Villalobos office. Care Transition Nurse (CTN) contacted the patient by telephone to follow up after recent hospital admission. He never heard from GI office about capsule endoscopy. States he is home sick. states he is having a hard time breathing - started 4 days ago. He did not call his PCP or pulm because he was just in hospital for same symptoms. Educated him that when symptoms like this start he needs to call Dr Boby Villalobos to report for recommendation to avoid a medical emergency and readmission. He voices understanding. Wearing oxygen at 3lpm - he turned it up earlier today. Checked SaO2 during call and 93-94% on 3lpm. CTN provided contact information. Do you have the following symptoms? Shortness of Breath: yes   Wheezing: yes - using albuterol hhn 2-3 times daily  Cough: yes    Cough Characteristics:  Productive: yes  Sputum Color: yellow  Hemoptysis: no  Consistency of sputum: thick     Fever: denies                                                 Temp: WNL  Chills/Sweats: sweats    What other symptoms are you having?:  none     How long have you had these symptoms?  started 4 days ago      Pharmacy: [address]      Have you been vaccinated for covid?  no COVID or flu vaccines    CTN reported above to Guillaume Rios at Dr Wayne Rosas office.      Follow Up  Future Appointments   Date Time Provider Alina Ronna   7/11/2022 11:20 AM Carmen Oscar MD [facility]  [place] - DYD   11/2/2022  2:00 PM MHC CT MAIN [facility] CT [facility] [place] Rad   11/2/2022  3:00 PM Sophia Keating [facility]       Shikha Vincent RN

## 2022-06-09 ENCOUNTER — CARE COORDINATION (OUTPATIENT)
Dept: CASE MANAGEMENT | Age: 67
End: 2022-06-09

## 2022-06-09 NOTE — CARE COORDINATION
[facility] Transitions Follow Up Call    2022    Patient: Steven Crowley  Patient : 1955   MRN: [id number]  Reason for Admission: GIB, acute blood loss anemia, iron deficiency anemia, s/p EGD with mild antral gastritis and esophageal varices, s/p colonoscopy with mild sigmoid diverticulosis, hx alcoholic cirrhosis, chronic hypoxic resp failure, COPD on home oxygen 2.5lpm, MICHAEL, leukocytosis, DM, HTN, HLD -> home no services, f/up with GI for capsule endoscopy  Discharge Date: 22 RARS: Readmission Risk Score: 17.4 ( )    Mr Juaquin Shanks picked up and started new medications (doxy and prednisone). O2 set at 3lpm. Still with FALCON, cough. Feels weak, but no worse than yesterday's call. Reviewed he can use his nebulizer 4x daily for wheezing/SOB. He voices understanding. He will monitor Sao2. If worsening he knows to report to ED for evaluation. He denies needs today. CTN contact number provided. Next CTN outreach in 1-2 days.      Follow Up  Future Appointments   Date Time Provider Alina Friend   2022 11:20 AM Dayna Armenta MD [facility] [place] [facility] [place] - [place]   2022  2:00 PM [facility] [place] [facility]   2022  3:00 PM Brandon De Jesus [facility]       Kervin Schmidt RN
declined

## 2022-06-10 ENCOUNTER — CARE COORDINATION (OUTPATIENT)
Dept: CASE MANAGEMENT | Age: 67
End: 2022-06-10

## 2022-06-10 NOTE — CARE COORDINATION
[facility] Transitions Follow Up Call    6/10/2022    Patient: Rosalio Walden  Patient : 1955   MRN: [id number]  Reason for Admission: GIB, acute blood loss anemia, iron deficiency anemia, s/p EGD with mild antral gastritis and esophageal varices, s/p colonoscopy with mild sigmoid diverticulosis, hx alcoholic cirrhosis, chronic hypoxic resp failure, COPD on home oxygen 2.5lpm, MICHAEL, leukocytosis, DM, HTN, HLD -> home no services, f/up with GI for capsule endoscopy  Discharge Date: 22 RARS: Readmission Risk Score: 17.4 ( )       Spoke with: Rosalio Walden (patient)    Needs to be reviewed by the provider   Additional needs identified to be addressed with provider: No         Method of communication with provider : none     Care Transition Nurse (CTN) contacted the patient by telephone to follow up after recent hospital admission. Addressed changes since last contact: none  Discussed follow-up appointments. If no appointment was previously scheduled, appointment scheduling offered: No.   Non-[facility] follow up appointment(s): NA    Discussed appropriate site of care based on symptoms and resources available to patient including: PCP  Specialist. The patient agrees to contact the PCP office for questions related to their healthcare. Patients top risk factors for readmission: medical condition-COPD on O2  Interventions to address risk factors: Education of patient/family/caregiver/guardian to support self-management-     SaO2 93% on 3lpm. Still coughing but now more productive with yellow sputum. He is able to move around more. He takes his O2 off to go outside. He does not smoke - states he quit. CTN educated him on wearing oxygen, oxygen weaning, goal SaO2. Instructed him to wear oxygen continuous while he his having exacerbation and requiring steroids and abx. Instructed him to contact O2 provider if he needs additional lengths of tubing to access his porch or more.  He voices understanding. CTN reviewed if he has any worsening symptoms to be evaluated in ED. He voices understanding. Denies needs going into weekend. CTN provided contact information. Plan for follow-up call in 3-5 days based on severity of symptoms and risk factors.   Plan for next call: symptom management-exac     Follow Up  Future Appointments   Date Time Provider Alina Ronna   7/11/2022 11:20 AM Lacey Hinton MD [place] [facility] [place] - [place]   11/2/2022  2:00 PM [facility] [place] [facility]   11/2/2022  3:00 PM Brendan Valentin [facility]       Hussein Jiménez RN

## 2022-06-13 ENCOUNTER — CARE COORDINATION (OUTPATIENT)
Dept: CASE MANAGEMENT | Age: 67
End: 2022-06-13

## 2022-06-13 NOTE — CARE COORDINATION
[facility] Transitions Follow Up Call    2022    Patient: Nick Morrow  Patient : 1955   MRN: [id number]  Reason for Admission:GIB, acute blood loss anemia, iron deficiency anemia, s/p EGD with mild antral gastritis and esophageal varices, s/p colonoscopy with mild sigmoid diverticulosis, hx alcoholic cirrhosis, chronic hypoxic resp failure, COPD on home oxygen 2.5lpm, MICHAEL, leukocytosis, DM, HTN, HLD   Discharge Date: 22 RARS: Readmission Risk Score: 17.4 ( )         Spoke with: Nick Morrow who reports that he is doing ok. Patient reports O2 sat is 94% this morning with O2 at 2.5 liters via n/c. Patient states that he did get the longer tubing so now he can keep O2 on when going outside. Patient denies cp, sob, cough, dizziness, headache, n/v, diarrhea, abdominal pains, fever, or chills. Patient report that appetite and fluid intake is good and denies any problems with bowel or bladder. Patient is taking all medications as ordered. Patient states that he missed Pain Management appointment because he did not have the key to turn portable O2 on. Writer contacted [facility] [employer] to get patient a key delivered. Writer advised patient to re-scheduled appointment with pain management. Patient verbalized understanding. Denies any needs at this time. Patient instructed to continue to monitor s/s, reporting any that may present to MD immediately for early intervention. Reminded of COVID 19 precautions. Agreeable to f/u calls. Writer contacted [facility]. And spoke with Des Louis who stated that they will get a O2 key out to patient at least by tomorrow. Writer made patient aware. Care Transitions Subsequent and Final Call    Subsequent and Final Calls  Care Transitions Interventions  Other Interventions:            Follow Up  Future Appointments   Date Time Provider Alina Friend   2022 11:20 AM MD [facility] - [place]   2022  2:00 PM [facility] CT [facility] [place] Rad   11/2/2022  3:00 PM Martha Araujo MD [facility] PULM [facility]       Lm Celaya LPN

## 2022-06-15 ENCOUNTER — CARE COORDINATION (OUTPATIENT)
Dept: CASE MANAGEMENT | Age: 67
End: 2022-06-15

## 2022-06-15 RX ORDER — DILTIAZEM HYDROCHLORIDE 180 MG/1
CAPSULE, COATED, EXTENDED RELEASE ORAL
Qty: 60 CAPSULE | Refills: 0 | Status: SHIPPED | OUTPATIENT
Start: 2022-06-15 | End: 2022-07-06

## 2022-06-15 NOTE — CARE COORDINATION
[facility] Transitions Follow Up Call    6/15/2022    Patient: Micki Page  Patient : 1955   MRN: [id number]  Reason for Admission: GIB, acute blood loss anemia, iron deficiency anemia, s/p EGD with mild antral gastritis and esophageal varices, s/p colonoscopy with mild sigmoid diverticulosis, hx alcoholic cirrhosis, chronic hypoxic resp failure, COPD on home oxygen 2.5lpm, MICHAEL, leukocytosis, DM, HTN, HLD -> home no services, f/up with GI for capsule endoscopy  Discharge Date: 22 RARS: Readmission Risk Score: 17.4 ( )    CTN attempted follow-up outreach to patient. Message left including CTN contact information for return call.      Follow Up  Future Appointments   Date Time Provider Alina Friend   2022 11:20 AM [facility] [place] [facility]   2022  2:00 PM MHC CT MAIN [facility] CT [facility] [place] Rad   2022  3:00 PM Silvestre Boudreaux [facility]       Nicole Smith RN

## 2022-06-16 ENCOUNTER — CARE COORDINATION (OUTPATIENT)
Dept: CARE COORDINATION | Age: 67
End: 2022-06-16

## 2022-06-17 ENCOUNTER — CARE COORDINATION (OUTPATIENT)
Dept: CASE MANAGEMENT | Age: 67
End: 2022-06-17

## 2022-06-17 NOTE — CARE COORDINATION
[facility] Transitions Follow Up Call    2022    Patient: Tyler Ellis  Patient : 1955   MRN: [id number]  Reason for Admission: GIB, acute blood loss anemia, iron deficiency anemia, s/p EGD with mild antral gastritis and esophageal varices, s/p colonoscopy with mild sigmoid diverticulosis, hx alcoholic cirrhosis, chronic hypoxic resp failure, COPD on home oxygen 2.5lpm, MICHAEL, leukocytosis, DM, HTN, HLD -> home no services, f/up with GI for capsule endoscopy  Discharge Date: 22 RARS: Readmission Risk Score: 17.4 ( )       Spoke with: Tyler Ellis (patient)    Needs to be reviewed by the provider   Additional needs identified to be addressed with provider: no         Method of communication with provider : none    Care Transition Nurse (CTN) contacted the patient by telephone to follow up after recent hospital admission. Addressed changes since last contact: none  Discussed follow-up appointments. If no appointment was previously scheduled, appointment scheduling offered: No.   Non-[facility] follow up appointment(s): NA    Discussed appropriate site of care based on symptoms and resources available to patient including: PCP  Specialist. The patient agrees to contact the PCP office for questions related to their healthcare. Patients top risk factors for readmission: medical condition-COPD  Interventions to address risk factors: Education of patient/family/caregiver/guardian to support self-management-s/s to monitor    Has two more days of the prednisone taper. SaO2 maintains most times around 92-93% RA. Still sleeping with oxygen on and monitoring for prn use during day time. Reports he is feeling a lot better but does still get out breath at times. He sounds a lot better in conversation. States he is able to get around, cooks his meals, work on household chores that need to be done.  The GI office did not ever contact him about about the capsule endoscopy and he states he is not interested in this procedure at this time because he says his iron is low and not bleeding anymore. Encouraged him to discuss at next PCP OV - reviewed date/time. He is aware. Denies needs at this time. Looking forward to family cookout at his house this weekend for Father's Day. Reviewed COPD zones and that he should never hesitate to contact Dr Cathe Aschoff prn for symptoms. CTN provided contact information. No further follow-up call indicated based on severity of symptoms and risk factors.   Plan for next call: referral to ambulatory care manager-[facility] RN     Follow Up  Future Appointments   Date Time Provider Alina Friend   7/11/2022 11:20 AM Dayana Rosales MD [place] [facility] [place] - [place]   11/2/2022  2:00 PM MHC CT MAIN [facility] CT [facility] [place] Rad   11/2/2022  3:00 PM Azeem Vergara MD [facility] PULM [facility]       Felipa Stephens RN

## 2022-06-23 RX ORDER — FOLIC ACID 1 MG/1
TABLET ORAL
Qty: 30 TABLET | Refills: 5 | Status: SHIPPED | OUTPATIENT
Start: 2022-06-23

## 2022-06-29 ENCOUNTER — CARE COORDINATION (OUTPATIENT)
Dept: CARE COORDINATION | Age: 67
End: 2022-06-29

## 2022-07-06 RX ORDER — DILTIAZEM HYDROCHLORIDE 180 MG/1
CAPSULE, COATED, EXTENDED RELEASE ORAL
Qty: 60 CAPSULE | Refills: 5 | Status: SHIPPED | OUTPATIENT
Start: 2022-07-06

## 2022-07-06 RX ORDER — FERROUS SULFATE 325(65) MG
TABLET ORAL
Qty: 30 TABLET | Refills: 5 | Status: SHIPPED | OUTPATIENT
Start: 2022-07-06

## 2022-07-11 ENCOUNTER — OFFICE VISIT (OUTPATIENT)
Dept: FAMILY MEDICINE CLINIC | Age: 67
End: 2022-07-11
Payer: MEDICARE

## 2022-07-11 VITALS
SYSTOLIC BLOOD PRESSURE: 128 MMHG | WEIGHT: 215 LBS | HEART RATE: 67 BPM | OXYGEN SATURATION: 94 % | BODY MASS INDEX: 33.67 KG/M2 | DIASTOLIC BLOOD PRESSURE: 80 MMHG

## 2022-07-11 DIAGNOSIS — E83.52 HYPERCALCEMIA: ICD-10-CM

## 2022-07-11 DIAGNOSIS — C67.9 MALIGNANT NEOPLASM OF URINARY BLADDER, UNSPECIFIED SITE (HCC): ICD-10-CM

## 2022-07-11 DIAGNOSIS — E78.2 MIXED HYPERLIPIDEMIA: ICD-10-CM

## 2022-07-11 DIAGNOSIS — K70.30 ALCOHOLIC CIRRHOSIS OF LIVER WITHOUT ASCITES (HCC): ICD-10-CM

## 2022-07-11 DIAGNOSIS — I73.9 PERIPHERAL VASCULAR DISEASE (HCC): ICD-10-CM

## 2022-07-11 DIAGNOSIS — E11.42 DIABETIC POLYNEUROPATHY ASSOCIATED WITH TYPE 2 DIABETES MELLITUS (HCC): ICD-10-CM

## 2022-07-11 DIAGNOSIS — K21.9 GASTROESOPHAGEAL REFLUX DISEASE WITHOUT ESOPHAGITIS: ICD-10-CM

## 2022-07-11 DIAGNOSIS — K74.60 LIVER CIRRHOSIS SECONDARY TO NASH (HCC): ICD-10-CM

## 2022-07-11 DIAGNOSIS — F41.8 SITUATIONAL ANXIETY: ICD-10-CM

## 2022-07-11 DIAGNOSIS — M15.9 OSTEOARTHRITIS OF MULTIPLE JOINTS, UNSPECIFIED OSTEOARTHRITIS TYPE: ICD-10-CM

## 2022-07-11 DIAGNOSIS — K75.81 LIVER CIRRHOSIS SECONDARY TO NASH (HCC): ICD-10-CM

## 2022-07-11 DIAGNOSIS — D50.9 IRON DEFICIENCY ANEMIA, UNSPECIFIED IRON DEFICIENCY ANEMIA TYPE: ICD-10-CM

## 2022-07-11 DIAGNOSIS — J41.8 MIXED SIMPLE AND MUCOPURULENT CHRONIC BRONCHITIS (HCC): ICD-10-CM

## 2022-07-11 DIAGNOSIS — Z87.891 HISTORY OF TOBACCO ABUSE: ICD-10-CM

## 2022-07-11 PROCEDURE — 1123F ACP DISCUSS/DSCN MKR DOCD: CPT | Performed by: FAMILY MEDICINE

## 2022-07-11 PROCEDURE — 1036F TOBACCO NON-USER: CPT | Performed by: FAMILY MEDICINE

## 2022-07-11 PROCEDURE — 3023F SPIROM DOC REV: CPT | Performed by: FAMILY MEDICINE

## 2022-07-11 PROCEDURE — G8427 DOCREV CUR MEDS BY ELIG CLIN: HCPCS | Performed by: FAMILY MEDICINE

## 2022-07-11 PROCEDURE — 99214 OFFICE O/P EST MOD 30 MIN: CPT | Performed by: FAMILY MEDICINE

## 2022-07-11 PROCEDURE — 3044F HG A1C LEVEL LT 7.0%: CPT | Performed by: FAMILY MEDICINE

## 2022-07-11 PROCEDURE — 36415 COLL VENOUS BLD VENIPUNCTURE: CPT | Performed by: FAMILY MEDICINE

## 2022-07-11 PROCEDURE — 2022F DILAT RTA XM EVC RTNOPTHY: CPT | Performed by: FAMILY MEDICINE

## 2022-07-11 PROCEDURE — G8417 CALC BMI ABV UP PARAM F/U: HCPCS | Performed by: FAMILY MEDICINE

## 2022-07-11 PROCEDURE — 3017F COLORECTAL CA SCREEN DOC REV: CPT | Performed by: FAMILY MEDICINE

## 2022-07-11 SDOH — ECONOMIC STABILITY: FOOD INSECURITY: WITHIN THE PAST 12 MONTHS, THE FOOD YOU BOUGHT JUST DIDN'T LAST AND YOU DIDN'T HAVE MONEY TO GET MORE.: NEVER TRUE

## 2022-07-11 SDOH — ECONOMIC STABILITY: FOOD INSECURITY: WITHIN THE PAST 12 MONTHS, YOU WORRIED THAT YOUR FOOD WOULD RUN OUT BEFORE YOU GOT MONEY TO BUY MORE.: NEVER TRUE

## 2022-07-11 ASSESSMENT — SOCIAL DETERMINANTS OF HEALTH (SDOH): HOW HARD IS IT FOR YOU TO PAY FOR THE VERY BASICS LIKE FOOD, HOUSING, MEDICAL CARE, AND HEATING?: NOT HARD AT ALL

## 2022-07-11 NOTE — PROGRESS NOTES
Chief Complaint   Patient presents with    COPD    Diabetes    Hypertension    Hyperlipidemia        Internal Administration   First Dose      Second Dose           Last COVID Lab SARS-CoV-2, PCR (no units)   Date Value   10/26/2021 Not Detected     SARS-CoV-2, ZAHIDA (no units)   Date Value   2020 NOT DETECTED     SARS-CoV-2, NAAT (no units)   Date Value   2022 Not Detected             Wt Readings from Last 3 Encounters:   22 228 lb (103.4 kg)   22 228 lb 2.8 oz (103.5 kg)   22 229 lb (103.9 kg)     BP Readings from Last 3 Encounters:   22 (!) 131/57   22 127/66   05/10/22 (!) 126/101      Lab Results   Component Value Date    LABA1C 6.8 2022    LABA1C 6.0 08/10/2021    LABA1C 6.4 2021       HPI:  Estefania Joya is a 73 y.o. (: 1955) here today for    Patient states that his blood pressure runs in the 130s/80s. His blood sugars run in the 130-140. He states that he continues to use 3L n/c continuously. He is not wearing his oxygen today in the office. He does have a portable tank because he does not like to haul it around per patient. He states that he is no longer smoking. He quit smoking a month ago. He states that he goes to see the pain doctor next wee. He states that since he left the hospital he has been out of pain medication and is really struggling with his pain. [] Patient has completed an advance directive  [x] Patient has NOT completed an advanced directive  [] Patient has a documented healthcare surrogate  [x] Patient does NOT have a documented healthcare surrogate  [] Discussed the importance of establishing and updating an advanced directive. Patient has questions at this time and those were answered. [x] Discussed the importance of establishing and updating an advanced directive. Patient does NOT have questions at this time.     Discussed with: [x] Patient            [] Family             [] Other caregiver    Patient's medications, allergies, past medical, surgical, social and family histories were reviewed and updated asappropriate on 2022 at 11:28 AM.    ROS:  Review of Systems    All other systems reviewed and are negative except as noted above on 2022 at 11:28 AM. Additional review of systems may be scanned into the media section ofthis medical record. Any responses requiring further intervention were pursued. Past Medical History:   Diagnosis Date    Bronchitis chronic     Chest pain     Chronic cough     Cirrhosis of liver (HCC) 2017    stage 4     COPD (chronic obstructive pulmonary disease) (HCC)     COPD (chronic obstructive pulmonary disease) (HCC)     Diabetes mellitus (HCC)     Gout     Hilar adenopathy     Hyperlipidemia     Hypertension     Knee pain, right     Numbness and tingling of leg     Osteoarthritis     Paresthesia of bilateral legs     PVD (peripheral vascular disease) (HCC)     Seizures (HCC)     ongoing, began after swine flu vaccination    Substance abuse ([facility] [address]      Family History   Problem Relation Age of Onset    Cancer Mother         Lung    Emphysema Father     Diabetes Sister     Hypertension Sister     Cancer Brother         throat    Asthma Neg Hx      Social History     Socioeconomic History    Marital status:      Spouse name: Not on file    Number of children: Not on file    Years of education: Not on file    Highest education level: Not on file   Occupational History    Occupation: [employer]     Comment: not working currently   Tobacco Use    Smoking status: Former Smoker     Packs/day: 1.00     Years: 42.00     Pack years: 42.00     Types: Cigarettes, Cigars     Start date: 2018     Quit date: 2022     Years since quittin.1    Smokeless tobacco: Former User     Types: Snuff   Vaping Use    Vaping Use: Former    Substances: Always   Substance and Sexual Activity    Alcohol use:  Yes Alcohol/week: 6.0 standard drinks     Types: 6 Cans of beer per week     Comment: patient states a 6 pack lasts him a week    Drug use: No    Sexual activity: Yes     Partners: Female   Other Topics Concern    Not on file   Social History Narrative    Not on file     Social Determinants of Health     Financial Resource Strain: Low Risk     Difficulty of Paying Living Expenses: Not hard at all   Food Insecurity: No Food Insecurity    Worried About [address] in the Last Year: Never true    [facility] in the Last Year: Never true   Transportation Needs:     Lack of Transportation (Medical): Not on file    Lack of Transportation (Non-Medical): Not on file   Physical Activity:     Days of Exercise per Week: Not on file    Minutes of Exercise per Session: Not on file   Stress:     Feeling of Stress : Not on file   Social Connections:     Frequency of Communication with Friends and Family: Not on file    Frequency of Social Gatherings with Friends and Family: Not on file    Attends Sikh Services: Not on file    Active Member of [address] [place] [address] or Organizations: Not on file    Attends Club or Organization Meetings: Not on file    Marital Status: Not on file   Intimate Partner Violence:     Fear of Current or Ex-Partner: Not on file    Emotionally Abused: Not on file    Physically Abused: Not on file    Sexually Abused: Not on file   Housing Stability:     Unable to Pay for Housing in the Last Year: Not on file    Number of [place] in the Last Year: Not on file    Unstable Housing in the Last Year: Not on file     Prior to Visit Medications    Medication Sig Taking?  Authorizing Provider   dilTIAZem (CARDIZEM CD) 180 MG extended release capsule TAKE 2 CAPSULES ONCE DAILY Yes Mara Delvalle MD   FEROSUL 325 (65 Fe) MG tablet Take 1 tablet by mouth daily (with breakfast) Yes Mara Delvalle MD   folic acid (FOLVITE) 1 MG tablet TAKE 1 TABLET BY MOUTH DAILY Yes Kameron Raman Sergio Ortiz MD   metoprolol succinate (TOPROL XL) 50 MG extended release tablet TAKE ONE TABLET BY MOUTH EVERY DAY Yes Rod Saravia MD   ipratropium-albuterol (DUONEB) 0.5-2.5 (3) MG/3ML SOLN nebulizer solution Inhale 3 mLs into the lungs every 4 hours Yes Yves Hill MD   albuterol sulfate  (90 Base) MCG/ACT inhaler INHALE 2 PUFFS EVERY 6 HOURS AS NEEDED FOR WHEEZING Yes Rod Saravia MD   pantoprazole (PROTONIX) 40 MG tablet TAKE ONE TABLET BY MOUTH EVERY MORNING BEFORE BREAKFAST Yes NAYELY Herron CNP   LANTUS SOLOSTAR 100 UNIT/ML injection pen INJECT 25 UNITS INTO THE SKIN DAILY WITH SUPPER  Patient taking differently: Taking 15 units HS (noted 5/12/2022) Yes Rod Saravia MD   TRUEplus Lancets 30G MISC TEST 2 TIMES DAILY Yes Rod Saravia MD   TRUE METRIX BLOOD GLUCOSE TEST strip USE 1 STRIP IN VITRO ROUTE 2 TIMES DAILY Yes Rod Saravia MD   allopurinol (ZYLOPRIM) 300 MG tablet TAKE 1 TABLET BY MOUTH DAILY Yes NAYELY Herron CNP   atorvastatin (LIPITOR) 40 MG tablet TAKE 1 TABLET BY MOUTH DAILY Yes Rod Saravia MD   DULoxetine (CYMBALTA) 60 MG extended release capsule TAKE ONE CAPSULE BY MOUTH EVERY DAY Yes Rod Saravia MD   thiamine 100 MG tablet TAKE 1 TABLET BY MOUTH DAILY Yes Rod Saravia MD   fluticasone-umeclidin-vilant (TRELEGY ELLIPTA) 100-62.5-25 MCG/INH AEPB Inhale 1 puff into the lungs daily Yes Rod Saravia MD   PENTIPS 31G X 8 MM MISC USE ONCE DAILY WITH INJECTION Yes Rod Saravia MD   ondansetron (ZOFRAN) 4 MG tablet Take 1 tablet by mouth every 6 hours as needed for Nausea or Vomiting Yes NAYELY Herron CNP   Alcohol Swabs (ALCOHOL PREP) 70 % PADS USE AS DIRECTED Yes Rod Saravia MD   oxyCODONE (ROXICODONE) 5 MG immediate release tablet Take 10 mg by mouth every 8 hours as needed for Pain.  Taking 0.5 tablet (10mg) q8h prn (noted 5/12/2022) Yes Historical Provider, MD   naloxone [facility]) 4 MG/0.1ML LIQD nasal spray 1 spray by Nasal route as needed for Opioid Reversal May repeat after 3 minutes if no or minimal response as directed  Yes Historical Provider, MD   gabapentin (NEURONTIN) 300 MG capsule Take 900 mg by mouth 3 times daily. Yes Historical Provider, MD   Blood Pressure Monitoring (BLOOD PRESSURE CUFF) MISC Please dispense insurance preference Yes Tiffany Real MD   Blood Glucose Monitoring Suppl (FREESTYLE FREEDOM LITE) w/Device KIT 1 kit by Does not apply route daily Yes NAYELY Espino CNP   OXYGEN Inhale 3 L/min into the lungs nightly as needed (and daily prn)  Yes Historical Provider, MD     Allergies   Allergen Reactions    Lisinopril Swelling    Ace Inhibitors Swelling    Influenza Vaccines      He states he was hospitalized when he received his first flu vaccine    Spiriva Handihaler [Tiotropium Bromide Monohydrate] Swelling     Tolerates both tudorza and incruse    Vilanterol        OBJECTIVE:  Estimated body mass index is 35.24 kg/m² as calculated from the following:    Height as of 5/20/22: 5' 7\" (1.702 m). Weight as of 5/20/22: 225 lb (102.1 kg). There were no vitals filed for this visit. Physical Exam  Vitals and nursing note reviewed. Constitutional:       General: He is not in acute distress. Appearance: He is well-developed. He is not diaphoretic. HENT:      Head: Normocephalic and atraumatic. Right Ear: External ear normal.      Left Ear: External ear normal.      Nose: Nose normal.   Eyes:      General: Lids are normal. No scleral icterus. Right eye: No discharge. Left eye: No discharge. Pupils: Pupils are equal, round, and reactive to light. Neck:      Thyroid: No thyromegaly. Vascular: No JVD. Cardiovascular:      Rate and Rhythm: Normal rate and regular rhythm. Heart sounds: Normal heart sounds. Pulmonary:      Effort: Pulmonary effort is normal. No respiratory distress. Breath sounds: Normal breath sounds. Abdominal:      Palpations: Abdomen is soft. There is no hepatomegaly or splenomegaly. Tenderness: There is no abdominal tenderness. Musculoskeletal:      Right lower leg: No edema. Left lower leg: No edema. Skin:     General: Skin is warm and dry. Coloration: Skin is not pale. Findings: No erythema or rash. Comments: Turgor normal   Neurological:      Mental Status: He is oriented to person, place, and time. Psychiatric:         Mood and Affect: Mood normal.         Behavior: Behavior normal.         Thought Content: Thought content normal.         Judgment: Judgment normal.              ASSESSMENT PLAN      Diagnosis Orders   1. Insulin dependent type 2 diabetes mellitus, uncontrolled (HCC)  MICROALBUMIN / CREATININE URINE RATIO   2. Mixed hyperlipidemia  LIPID PANEL   3. Iron deficiency anemia, unspecified iron deficiency anemia type  CBC with Auto Differential   4. Hypercalcemia  Comprehensive Metabolic Panel   5. Situational anxiety  Vitamin D 25 Hydroxy   6. Osteoarthritis of multiple joints, unspecified osteoarthritis type     7. Mixed simple and mucopurulent chronic bronchitis ([address]     8. Diabetic polyneuropathy associated with type 2 diabetes mellitus ([address]     9. Peripheral vascular disease ([address]     10. 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 ([address]     11. Alcoholic cirrhosis of liver without ascites ([address]     12. Malignant neoplasm of urinary bladder, unspecified site ([address]     13. Gastroesophageal reflux disease without esophagitis     14. History of tobacco abuse     Blood sugar readings by glycosylated hemoglobin or home fingerstick blood sugars are acceptable and no changes in diabetic medication as listed in the medication list are necessary. Lipids will be monitored based upon levels requiring treatment and other cardiac risks.   Medications for hyperlipidemia and hypertriglyceridemia as listed on the medication list will be changed as necessary to reach control parameters. Last hemoglobin was 9.7 will await updated CBC. No symptoms of elevated calcium. Nerves of the same. Has been out of pain medication for several weeks due to missing an appointment when he still felt bad posthospitalization. He gets back into pain management a week. Arthritis is stable. Breathing is poor but stable. Has only an occasional alcoholic drink but has quit smoking. No symptoms of bladder cancer recurrence. Reflux symptoms based upon patient's history is controlled and no changes in medications for reflux as listed in the medication profile is necessary. Follow-up 6 months    Patient should call the office immediately with new or ongoing signs or symptoms or worsening, or proceed to the emergency room. No changes in past medical history, past surgical history, social history, or family history were noted during the patient encounter unless specifically listed above. All updates of past medical history, past surgical history, social history, or family history were reviewed personally by me during the office visit. All problems listed in the assessment are stable unless noted otherwise. Medication profile reviewed personally by me during the visit. Medication side effects and possible impairments from medications were discussed as applicable. This document was prepared by a combination of typing and transcription through a voice recognition software. Scribe attestation: Tex Bunn RN, am scribing for and in the presence of Gelacio Moody MD. Electronically signed by Melene Schaumann, RN on 7/11/2022 at 11:28 AM      Provider attestation:     I, Dr. Maurisio Bullock, personally performed the services described in this documentation, as scribed by the above signed scribe in my presence, and it is both accurate and complete.  I agree with the ROS and Past Histories independently gathered by the clinical support staff and the remaining scribed note accurately describes my personal service to the patient.       7/11/2022    12:18 PM

## 2022-07-11 NOTE — PATIENT INSTRUCTIONS
Patient should call the office immediately with new or ongoing signs or symptoms or worsening, or proceed to the emergency room. If you are on medications which could impair your senses, you are at risk of weakness, falls, dizziness, or drowsiness. You should be careful during activities which could place you at risk of harm, such as climbing, using stairs, operating machinery, or driving vehicles. If you feel you cannot safely do these activities, you should request others to help you, or avoid the activities altogether. If you are drowsy for any other reason, you should use the same precautions as listed above.      Web Address for Advance Directive:    [email or web address]

## 2022-07-12 DIAGNOSIS — E55.9 VITAMIN D DEFICIENCY: Primary | ICD-10-CM

## 2022-07-12 LAB
A/G RATIO: 1.2 (ref 1.1–2.2)
ALBUMIN SERPL-MCNC: 3.8 G/DL (ref 3.4–5)
ALP BLD-CCNC: 182 U/L (ref 40–129)
ALT SERPL-CCNC: 21 U/L (ref 10–40)
ANION GAP SERPL CALCULATED.3IONS-SCNC: 13 MMOL/L (ref 3–16)
ANISOCYTOSIS: ABNORMAL
AST SERPL-CCNC: 31 U/L (ref 15–37)
ATYPICAL LYMPHOCYTE RELATIVE PERCENT: 2 % (ref 0–6)
BASOPHILS ABSOLUTE: 0.1 K/UL (ref 0–0.2)
BASOPHILS RELATIVE PERCENT: 1 %
BILIRUB SERPL-MCNC: 0.4 MG/DL (ref 0–1)
BUN BLDV-MCNC: 6 MG/DL (ref 7–20)
CALCIUM SERPL-MCNC: 9.1 MG/DL (ref 8.3–10.6)
CHLORIDE BLD-SCNC: 97 MMOL/L (ref 99–110)
CHOLESTEROL, TOTAL: 154 MG/DL (ref 0–199)
CO2: 30 MMOL/L (ref 21–32)
CREAT SERPL-MCNC: 1.1 MG/DL (ref 0.8–1.3)
EOSINOPHILS ABSOLUTE: 0.1 K/UL (ref 0–0.6)
EOSINOPHILS RELATIVE PERCENT: 1 %
GFR AFRICAN AMERICAN: >60
GFR NON-AFRICAN AMERICAN: >60
GLUCOSE BLD-MCNC: 86 MG/DL (ref 70–99)
HCT VFR BLD CALC: 43.7 % (ref 40.5–52.5)
HDLC SERPL-MCNC: 46 MG/DL (ref 40–60)
HEMOGLOBIN: 13.7 G/DL (ref 13.5–17.5)
LDL CHOLESTEROL CALCULATED: 86 MG/DL
LYMPHOCYTES ABSOLUTE: 2.2 K/UL (ref 1–5.1)
LYMPHOCYTES RELATIVE PERCENT: 25 %
MCH RBC QN AUTO: 28 PG (ref 26–34)
MCHC RBC AUTO-ENTMCNC: 31.3 G/DL (ref 31–36)
MCV RBC AUTO: 89.4 FL (ref 80–100)
MONOCYTES ABSOLUTE: 0.3 K/UL (ref 0–1.3)
MONOCYTES RELATIVE PERCENT: 4 %
NEUTROPHILS ABSOLUTE: 5.5 K/UL (ref 1.7–7.7)
NEUTROPHILS RELATIVE PERCENT: 67 %
PDW BLD-RTO: 26.1 % (ref 12.4–15.4)
PLATELET # BLD: 239 K/UL (ref 135–450)
PMV BLD AUTO: 7.2 FL (ref 5–10.5)
POTASSIUM SERPL-SCNC: 3.9 MMOL/L (ref 3.5–5.1)
RBC # BLD: 4.88 M/UL (ref 4.2–5.9)
SODIUM BLD-SCNC: 140 MMOL/L (ref 136–145)
TOTAL PROTEIN: 7 G/DL (ref 6.4–8.2)
TRIGL SERPL-MCNC: 111 MG/DL (ref 0–150)
VITAMIN D 25-HYDROXY: 23.1 NG/ML
VLDLC SERPL CALC-MCNC: 22 MG/DL
WBC # BLD: 8.2 K/UL (ref 4–11)

## 2022-07-12 RX ORDER — CHOLECALCIFEROL (VITAMIN D3) 125 MCG
5000 CAPSULE ORAL DAILY
Qty: 90 CAPSULE | Refills: 3 | Status: SHIPPED | OUTPATIENT
Start: 2022-07-12

## 2022-07-19 RX ORDER — FUROSEMIDE 20 MG/1
TABLET ORAL
Qty: 30 TABLET | Refills: 0 | Status: SHIPPED | OUTPATIENT
Start: 2022-07-19 | End: 2022-08-16

## 2022-08-02 DIAGNOSIS — F41.9 ANXIETY: ICD-10-CM

## 2022-08-02 DIAGNOSIS — E11.42 DIABETIC POLYNEUROPATHY ASSOCIATED WITH TYPE 2 DIABETES MELLITUS (HCC): ICD-10-CM

## 2022-08-02 DIAGNOSIS — F41.8 SITUATIONAL ANXIETY: ICD-10-CM

## 2022-08-02 RX ORDER — FLUTICASONE FUROATE, UMECLIDINIUM BROMIDE AND VILANTEROL TRIFENATATE 100; 62.5; 25 UG/1; UG/1; UG/1
1 POWDER RESPIRATORY (INHALATION) DAILY
Qty: 1 EACH | Refills: 5 | Status: SHIPPED | OUTPATIENT
Start: 2022-08-02

## 2022-08-02 RX ORDER — DULOXETIN HYDROCHLORIDE 60 MG/1
CAPSULE, DELAYED RELEASE ORAL
Qty: 30 CAPSULE | Refills: 0 | Status: SHIPPED | OUTPATIENT
Start: 2022-08-02 | End: 2022-08-29

## 2022-08-03 RX ORDER — INSULIN GLARGINE 100 [IU]/ML
INJECTION, SOLUTION SUBCUTANEOUS
Qty: 15 ML | Refills: 0 | Status: SHIPPED | OUTPATIENT
Start: 2022-08-03

## 2022-08-03 RX ORDER — BLOOD PRESSURE TEST KIT
KIT MISCELLANEOUS
Qty: 100 EACH | Refills: 5 | Status: SHIPPED | OUTPATIENT
Start: 2022-08-03

## 2022-08-08 ENCOUNTER — CARE COORDINATION (OUTPATIENT)
Dept: CARE COORDINATION | Age: 67
End: 2022-08-08

## 2022-08-08 DIAGNOSIS — Z71.6 ENCOUNTER FOR TOBACCO USE CESSATION COUNSELING: Primary | ICD-10-CM

## 2022-08-08 ASSESSMENT — ENCOUNTER SYMPTOMS: DYSPNEA ASSOCIATED WITH: MINIMAL EXERTION

## 2022-08-08 NOTE — CARE COORDINATION
to my plan of care. I will follow my Zone Management tool to seek urgent or emergent care. I will notify my provider of any symptoms that indicate a worsening of my condition. Barriers: impairment:  breathlessness, no pen  Plan for overcoming my barriers: this education and support   Confidence: 10/10  Anticipated Goal Completion Date: 9/22 goal extended     Educated again on copd and diabetes zone. Copies to be mailed again. Patient has been been completing recommended follow ups. S/S or exacerbation of symptoms discussed and when to call the physician and ACM. 8/8/22 trb          Medication Management   No change     I will take my medication as directed. I will notify my provider of any problems with medications, like adverse effects or side effects. I will notify my provider/Care Coordinator if I am unable to afford my medications. I will notify my provider for advice before I stop taking any of my medication. I will notify PCP or ACM if my blood sugars are less than 80 or over 200. Barriers: lack of motivation, financial, lack of support, overwhelmed by complexity of regimen, and lack of education  Plan for overcoming my barriers: education and support   Confidence: 5/10  Anticipated Goal Completion Date: 10/22    Patient has been holding his lantus for blood sugars at 90. Discussed how basal insulin works. Encouraged snacks at hs. We reviewed normal blood sugar ranges and parameters for high or low sugars. S/s of hypo/hyperglycemia discussed. 8/8/22 trb        Wellness Goal   Worsening     Patient Self-Management Goal for Health Maintenance  Goal: I will chose a goal related to tobacco cessation:  I will think about my triggers for smoking, I will think about reasons why I should quit smoking, I will learn more about the harmful effects of tobacco, I will avoid triggers and negative influences, and I will try a nicotine replacement product or medication.   Barriers: overwhelmed by complexity of regimen and stress  Plan for overcoming my barriers: education and support   Confidence: 5/10  Anticipated Goal Completion Date: 10/22    Stopped smoking but has been taking small amounts up again. Educated again on health benefits and products to assist him. Smoking cessation materials mailed. 8/8/22 trb                       Prior to Admission medications    Medication Sig Start Date End Date Taking? Authorizing Provider   SHIRLEY SOLOSTAR 100 UNIT/ML injection pen INJECT 25 UNITS INTO THE SKIN DAILY WITH SUPPER 8/3/22  Yes Francisco Bolivar MD   DULoxetine (CYMBALTA) 60 MG extended release capsule TAKE ONE CAPSULE BY MOUTH EVERY DAY 8/2/22  Yes Francisco Bolivar MD   fluticasone-umeclidin-vilant (TRELEGY ELLIPTA) 100-62.5-25 MCG/INH AEPB Inhale 1 puff into the lungs in the morning.  8/2/22  Yes Francisco Bolivar MD   vitamin D (VITAMIN D3 ULTRA STRENGTH) 125 MCG (5000 UT) CAPS capsule Take 1 capsule by mouth daily 7/12/22  Yes Francisco Bolivar MD   dilTIAZem [facility] CD) 180 MG extended release capsule TAKE 2 CAPSULES ONCE DAILY 7/6/22  Yes Francisco Bolivar MD   FEROSUL 325 (65 Fe) MG tablet Take 1 tablet by mouth daily (with breakfast) 7/6/22  Yes Francisco Bolivar MD   folic acid (FOLVITE) 1 MG tablet TAKE 1 TABLET BY MOUTH DAILY 6/23/22  Yes Francisco Bolivar MD   metoprolol succinate (TOPROL XL) 50 MG extended release tablet TAKE ONE TABLET BY MOUTH EVERY DAY 6/8/22  Yes Francisco Bolivar MD   albuterol sulfate  (90 Base) MCG/ACT inhaler INHALE 2 PUFFS EVERY 6 HOURS AS NEEDED FOR WHEEZING 4/25/22  Yes Francisco Bolivar MD   pantoprazole (PROTONIX) 40 MG tablet TAKE ONE TABLET BY MOUTH EVERY MORNING BEFORE BREAKFAST 4/13/22  Yes NAYELY Fall CNP   allopurinol (ZYLOPRIM) 300 MG tablet TAKE 1 TABLET BY MOUTH DAILY 4/4/22  Yes NAYELY Fall CNP   atorvastatin (LIPITOR) 40 MG tablet TAKE 1 TABLET BY MOUTH DAILY 4/4/22  Yes Trey Ambriz MD   ondansetron [facility]) 4 MG tablet Take 1 tablet by mouth every 6 hours as needed for Nausea or Vomiting 3/22/21  Yes NAYELY Hannah CNP   oxyCODONE (ROXICODONE) 5 MG immediate release tablet Take 10 mg by mouth every 8 hours as needed for Pain. Taking 0.5 tablet (10mg) q8h prn (noted 5/12/2022)   Yes Historical Provider, MD   naloxone [facility]) 4 MG/0.1ML LIQD nasal spray 1 spray by Nasal route as needed for Opioid Reversal May repeat after 3 minutes if no or minimal response as directed    Yes Historical Provider, MD   gabapentin (NEURONTIN) 300 MG capsule Take 900 mg by mouth 3 times daily.   1/20/20  Yes Historical Provider, MD   OXYGEN Inhale 3 L/min into the lungs nightly as needed (and daily prn)    Yes Historical Provider, MD   Alcohol Swabs PADS USE AS DIRECTED TO TEST AND INSULIN 8/3/22   Trey Ambriz MD   furosemide (LASIX) 20 MG tablet TAKE 1 TABLET BY MOUTH DAILY  Patient not taking: Reported on 8/8/2022 7/19/22   Trey Ambriz MD   ipratropium-albuterol (DUONEB) 0.5-2.5 (3) MG/3ML SOLN nebulizer solution Inhale 3 mLs into the lungs every 4 hours  Patient not taking: Reported on 8/8/2022 5/5/22   MD JOHNNY Breenplus Lancets 30G MISC TEST 2 TIMES DAILY 4/6/22   Trey Ambriz MD   TRUE METRIX BLOOD GLUCOSE TEST strip USE 1 STRIP IN VITRO ROUTE 2 TIMES DAILY 4/6/22   Trey Ambriz MD   thiamine 100 MG tablet TAKE 1 TABLET BY MOUTH DAILY 2/24/22   Trey Ambriz MD   PENTIPS 31G X 8 MM MISC USE ONCE DAILY WITH INJECTION 1/19/22   Trey Ambriz MD   Blood Pressure Monitoring (BLOOD PRESSURE CUFF) MISC Please dispense insurance preference 7/18/19   Trey Ambriz MD   Blood Glucose Monitoring Suppl (FREESTYLE FREEDOM LITE) w/Device KIT 1 kit by Does not apply route daily 12/14/18   Darrian Lass, APRN - CNP       Future Appointments   Date Time Provider Alina Friend   11/2/2022  2:00 PM [facility] [place] [facility]   11/2/2022  3:00 PM MD DEMARCUS [facility]   1/10/2023 11:20 AM Dallin Espinoza MD [facility]   ,   Diabetes Assessment    Medic Alert ID: No  Meal Planning: Plate Method, Avoidance of concentrated sweets   How often do you test your blood sugar?: Daily   Do you have barriers with adherence to non-pharmacologic self-management interventions?  (Nutrition/Exercise/Self-Monitoring): No   Have you ever had to go to the ED for symptoms of low blood sugar?: No       No patient-reported symptoms   Do you have hyperglycemia symptoms?: No   Do you have hypoglycemia symptoms?: No   Blood Sugar Monitoring Regimen: Once a Day        , and   COPD Assessment    Does the patient understand envrionmental exposure?: Yes  Is the patient able to verbalize Rescue vs. Long Acting medications?: Yes  Does the patient have a nebulizer?: Yes  Does the patient use a space with inhaled medications?: No            Symptoms:     Symptom course: stable  Breathlessness: minimal exertion  Increase use of rapid acting/rescue inhaled medications?: No  Change in chronic cough?: No/At Baseline  Change in sputum?: No/At Baseline  Self Monitoring - SaO2: Yes (Comment: 3l.nc)  Have you had a recent diagnosis of pneumonia either by PCP or at a hospital?: No

## 2022-08-16 RX ORDER — FUROSEMIDE 20 MG/1
TABLET ORAL
Qty: 30 TABLET | Refills: 0 | Status: SHIPPED | OUTPATIENT
Start: 2022-08-16 | End: 2022-09-12

## 2022-08-23 RX ORDER — LANOLIN ALCOHOL/MO/W.PET/CERES
CREAM (GRAM) TOPICAL
Qty: 100 TABLET | Refills: 0 | Status: SHIPPED | OUTPATIENT
Start: 2022-08-23

## 2022-08-29 DIAGNOSIS — E11.42 DIABETIC POLYNEUROPATHY ASSOCIATED WITH TYPE 2 DIABETES MELLITUS (HCC): ICD-10-CM

## 2022-08-29 DIAGNOSIS — F41.9 ANXIETY: ICD-10-CM

## 2022-08-29 DIAGNOSIS — F41.8 SITUATIONAL ANXIETY: ICD-10-CM

## 2022-08-29 RX ORDER — DULOXETIN HYDROCHLORIDE 60 MG/1
CAPSULE, DELAYED RELEASE ORAL
Qty: 30 CAPSULE | Refills: 0 | Status: SHIPPED | OUTPATIENT
Start: 2022-08-29 | End: 2022-09-19

## 2022-09-12 ENCOUNTER — CARE COORDINATION (OUTPATIENT)
Dept: CARE COORDINATION | Age: 67
End: 2022-09-12

## 2022-09-12 RX ORDER — ATORVASTATIN CALCIUM 40 MG/1
TABLET, FILM COATED ORAL
Qty: 30 TABLET | Refills: 0 | Status: SHIPPED | OUTPATIENT
Start: 2022-09-12 | End: 2022-10-11

## 2022-09-12 RX ORDER — FUROSEMIDE 20 MG/1
TABLET ORAL
Qty: 30 TABLET | Refills: 0 | Status: SHIPPED | OUTPATIENT
Start: 2022-09-12 | End: 2022-10-11

## 2022-09-12 SDOH — ECONOMIC STABILITY: HOUSING INSECURITY
IN THE LAST 12 MONTHS, WAS THERE A TIME WHEN YOU DID NOT HAVE A STEADY PLACE TO SLEEP OR SLEPT IN A SHELTER (INCLUDING NOW)?: NO

## 2022-09-12 SDOH — ECONOMIC STABILITY: INCOME INSECURITY: HOW HARD IS IT FOR YOU TO PAY FOR THE VERY BASICS LIKE FOOD, HOUSING, MEDICAL CARE, AND HEATING?: NOT VERY HARD

## 2022-09-12 SDOH — SOCIAL STABILITY: SOCIAL NETWORK: HOW OFTEN DO YOU ATTEND CHURCH OR RELIGIOUS SERVICES?: NEVER

## 2022-09-12 SDOH — SOCIAL STABILITY: SOCIAL NETWORK: IN A TYPICAL WEEK, HOW MANY TIMES DO YOU TALK ON THE PHONE WITH FAMILY, FRIENDS, OR NEIGHBORS?: TWICE A WEEK

## 2022-09-12 SDOH — HEALTH STABILITY: MENTAL HEALTH
STRESS IS WHEN SOMEONE FEELS TENSE, NERVOUS, ANXIOUS, OR CAN'T SLEEP AT NIGHT BECAUSE THEIR MIND IS TROUBLED. HOW STRESSED ARE YOU?: ONLY A LITTLE

## 2022-09-12 SDOH — HEALTH STABILITY: PHYSICAL HEALTH: ON AVERAGE, HOW MANY DAYS PER WEEK DO YOU ENGAGE IN MODERATE TO STRENUOUS EXERCISE (LIKE A BRISK WALK)?: 0 DAYS

## 2022-09-12 SDOH — HEALTH STABILITY: PHYSICAL HEALTH: ON AVERAGE, HOW MANY MINUTES DO YOU ENGAGE IN EXERCISE AT THIS LEVEL?: 0 MIN

## 2022-09-12 SDOH — ECONOMIC STABILITY: TRANSPORTATION INSECURITY
IN THE PAST 12 MONTHS, HAS LACK OF TRANSPORTATION KEPT YOU FROM MEETINGS, WORK, OR FROM GETTING THINGS NEEDED FOR DAILY LIVING?: NO

## 2022-09-12 SDOH — ECONOMIC STABILITY: FOOD INSECURITY: WITHIN THE PAST 12 MONTHS, THE FOOD YOU BOUGHT JUST DIDN'T LAST AND YOU DIDN'T HAVE MONEY TO GET MORE.: NEVER TRUE

## 2022-09-12 SDOH — SOCIAL STABILITY: SOCIAL NETWORK: HOW OFTEN DO YOU GET TOGETHER WITH FRIENDS OR RELATIVES?: TWICE A WEEK

## 2022-09-12 SDOH — SOCIAL STABILITY: SOCIAL NETWORK: ARE YOU MARRIED, WIDOWED, DIVORCED, SEPARATED, NEVER MARRIED, OR LIVING WITH A PARTNER?: DIVORCED

## 2022-09-12 SDOH — ECONOMIC STABILITY: INCOME INSECURITY: IN THE LAST 12 MONTHS, WAS THERE A TIME WHEN YOU WERE NOT ABLE TO PAY THE MORTGAGE OR RENT ON TIME?: NO

## 2022-09-12 SDOH — ECONOMIC STABILITY: HOUSING INSECURITY: IN THE LAST 12 MONTHS, HOW MANY PLACES HAVE YOU LIVED?: 1

## 2022-09-12 SDOH — HEALTH STABILITY: MENTAL HEALTH: HOW OFTEN DO YOU HAVE A DRINK CONTAINING ALCOHOL?: NEVER

## 2022-09-12 SDOH — ECONOMIC STABILITY: FOOD INSECURITY: WITHIN THE PAST 12 MONTHS, YOU WORRIED THAT YOUR FOOD WOULD RUN OUT BEFORE YOU GOT MONEY TO BUY MORE.: NEVER TRUE

## 2022-09-12 SDOH — SOCIAL STABILITY: SOCIAL NETWORK
DO YOU BELONG TO ANY CLUBS OR ORGANIZATIONS SUCH AS CHURCH GROUPS UNIONS, FRATERNAL OR ATHLETIC GROUPS, OR SCHOOL GROUPS?: NO

## 2022-09-12 ASSESSMENT — ENCOUNTER SYMPTOMS: DYSPNEA ASSOCIATED WITH: MINIMAL EXERTION

## 2022-09-12 NOTE — CARE COORDINATION
Ambulatory Care Coordination Note  9/12/2022    ACC: Jose Garcia, RN    Summary Note: ACM spoke with patient for outreach. He had a fall one week ago. [place] Hymen and slipped reaching or his cat that he said was choking . He did scrap up his arm and hit forehead. No loc. Denies needing additional follow up related to the fall. Discussed fall safety and worsening s/s to report. Fall safety reminders to be mailed, in addition. He is still using 3l/nc. No copd exacerbations. Smoking more again. 1 pack every 3 days. Encouraged again to stop. Health benefits discussed. Nicorette gum does not seem to help with the cravings. Encouraged to continue to work with the provider for support with this. Lab Results   Component Value Date    LABA1C 6.8 05/08/2022    LABA1C 6.0 08/10/2021    LABA1C 6.4 02/05/2021     Lab Results   Component Value Date    .5 05/08/2022    .5 08/10/2021    .0 02/05/2021     Blood sugars have been controlled per patient. Plan    Continue to assess for fall safety   Diabetes and copd support ongoing. Smoking cessation         Lab Results       None            Care Coordination Interventions    Referral from Primary Care Provider: No  Suggested Interventions and Community Resources  Grief Counselor: Not Started  Other Services or Interventions: 5/6/22- mailed out ACP docs          Goals Addressed                   This Visit's Progress       Care Coordination     COMPLETED: Conditions and Symptoms        I will schedule office visits, as directed by my provider. I will keep my appointment or reschedule if I have to cancel. I will notify my provider of any barriers to my plan of care. I will follow my Zone Management tool to seek urgent or emergent care. I will notify my provider of any symptoms that indicate a worsening of my condition.     Barriers: impairment:  breathlessness, no pen  Plan for overcoming my barriers: this education and support   Confidence: 10/10  Anticipated Goal Completion Date: 9/22 goal extended     Educated again on copd and diabetes zone. Copies to be mailed again. Patient has been been completing recommended follow ups. S/S or exacerbation of symptoms discussed and when to call the physician and ACM. No exacerbations 9/12/22 trb          Medication Management   Improving     I will take my medication as directed. I will notify my provider of any problems with medications, like adverse effects or side effects. I will notify my provider/Care Coordinator if I am unable to afford my medications. I will notify my provider for advice before I stop taking any of my medication. I will notify PCP or ACM if my blood sugars are less than 80 or over 200. Barriers: lack of motivation, financial, lack of support, overwhelmed by complexity of regimen, and lack of education  Plan for overcoming my barriers: education and support   Confidence: 5/10  Anticipated Goal Completion Date: 10/22     He is checking sugars twice a day. Taking meds a prescribed. 9/12/22 trb        Reduce Falls    No change     I will reduce my risk of falls by the following: Remove rugs or use non slip rugs  Install grab bars in bathroom  Use walking aids like cane or walker    Barriers: impairment:  physical: copd weakness   Plan for overcoming my barriers: education and support   Confidence: 8/10  Anticipated Goal Completion Date: 10/22    Fall safety discussed and handouts mailed. New fall one week ago. Tripped up. Denies difficulty ambulating. 9/12/22 trb        Wellness Goal   Worsening     Patient Self-Management Goal for Health Maintenance  Goal: I will chose a goal related to tobacco cessation:  I will think about my triggers for smoking, I will think about reasons why I should quit smoking, I will learn more about the harmful effects of tobacco, I will avoid triggers and negative influences, and I will try a nicotine replacement product or medication.   Barriers: overwhelmed by complexity of regimen and stress  Plan for overcoming my barriers: education and support   Confidence: 5/10  Anticipated Goal Completion Date: 10/22    Stopped smoking but has been taking small amounts up again. 1 pack every 3 days. Educated again on health benefits and products to assist him. Smoking cessation materials received and encouraged cessation again on this call. Education about smoking and safety with use of oxygen discussed. 9/12/22 trb                     Prior to Admission medications    Medication Sig Start Date End Date Taking? Authorizing Provider   DULoxetine (CYMBALTA) 60 MG extended release capsule TAKE ONE CAPSULE BY MOUTH EVERY DAY 8/29/22   Gala Malhotra MD   thiamine 100 MG tablet TAKE 1 TABLET BY MOUTH DAILY 8/23/22   Gala Malhotra MD   furosemide (LASIX) 20 MG tablet TAKE 1 TABLET BY MOUTH DAILY 8/16/22   Gala Malhotra MD   nicotine polacrilex (NICORETTE) 2 MG gum Chew one piece of gum every 3 to 4  hours as needed for smoking cessation. No more than 5 pieces of gum in a day. 8/8/22   Nisha Tam, APRN - CNP   LANTUS SOLOSTAR 100 UNIT/ML injection pen INJECT 25 UNITS INTO THE SKIN DAILY WITH SUPPER 8/3/22   Gala Malhotra MD   Alcohol Swabs PADS USE AS DIRECTED TO TEST AND INSULIN 8/3/22   Gala Malhotra MD   fluticasone-umeclidin-vilant (TRELEGY ELLIPTA) 824-86.1-23 MCG/INH AEPB Inhale 1 puff into the lungs in the morning.  8/2/22   Gala Malhotra MD   vitamin D (VITAMIN D3 ULTRA STRENGTH) 125 MCG (5000 UT) CAPS capsule Take 1 capsule by mouth daily 7/12/22   MD rohan WangTIAHunter AREVALO [facility] CD) 180 MG extended release capsule TAKE 2 CAPSULES ONCE DAILY 7/6/22   Gala Malhotra MD   FEROSUL 325 (83 Fe) MG tablet Take 1 tablet by mouth daily (with breakfast) 7/6/22   Gala Malhotra MD   folic acid (FOLVITE) 1 MG tablet TAKE 1 TABLET BY MOUTH DAILY 6/23/22 Taina Srivastava MD   metoprolol succinate (TOPROL XL) 50 MG extended release tablet TAKE ONE TABLET BY MOUTH EVERY DAY 6/8/22   Taina Srivastava MD   ipratropium-albuterol (DUONEB) 0.5-2.5 (3) MG/3ML SOLN nebulizer solution Inhale 3 mLs into the lungs every 4 hours  Patient not taking: Reported on 8/8/2022 5/5/22   Ann Marie Odell MD   albuterol sulfate  (90 Base) MCG/ACT inhaler INHALE 2 PUFFS EVERY 6 HOURS AS NEEDED FOR WHEEZING 4/25/22   Taina Srivastava MD   pantoprazole (PROTONIX) 40 MG tablet TAKE ONE TABLET BY MOUTH EVERY MORNING BEFORE BREAKFAST 4/13/22   NAYELY Sanchez CNP   TRUEplus Lancets 30G [address] [facility] TEST 2 TIMES DAILY 4/6/22   Taina Srivastava MD   TRUE METRIX BLOOD GLUCOSE TEST strip USE 1 STRIP IN VITRO ROUTE 2 TIMES DAILY 4/6/22   Taina Srivastava MD   allopurinol (ZYLOPRIM) 300 MG tablet TAKE 1 TABLET BY MOUTH DAILY 4/4/22   NAYELY Sanchez CNP   atorvastatin (LIPITOR) 40 MG tablet TAKE 1 TABLET BY MOUTH DAILY 4/4/22   Taina Srivastava MD   PENTIPS 31G X 8 MM MISC USE ONCE DAILY WITH INJECTION 1/19/22   Taina Srivastava MD   ondansetron [facility]) 4 MG tablet Take 1 tablet by mouth every 6 hours as needed for Nausea or Vomiting 3/22/21   NAYELY Sanchez CNP   oxyCODONE (ROXICODONE) 5 MG immediate release tablet Take 10 mg by mouth every 8 hours as needed for Pain. Taking 0.5 tablet (10mg) q8h prn (noted 5/12/2022)    Historical Provider, MD   naloxone [facility]) 4 MG/0.1ML LIQD nasal spray 1 spray by Nasal route as needed for Opioid Reversal May repeat after 3 minutes if no or minimal response as directed     Historical Provider, MD   gabapentin (NEURONTIN) 300 MG capsule Take 900 mg by mouth 3 times daily.   1/20/20   Historical Provider, MD   Blood Pressure Monitoring (BLOOD PRESSURE CUFF) MISC Please dispense insurance preference 7/18/19   Taina Srivastava MD   Blood Glucose Monitoring Suppl (FREESTYLE FREEDOM LITE) w/Device KIT 1 kit by Does not apply route daily 12/14/18   Danisha Mackenize APRN - CNP   OXYGEN Inhale 3 L/min into the lungs nightly as needed (and daily prn)     Historical Provider, MD       Future Appointments   Date Time Provider Alina Friend   11/2/2022  2:00 PM [facility] CT MAIN [facility] [place] [facility]   11/2/2022  3:00 PM Ruth Rubio MD [facility] PULM [facility]   1/10/2023 11:20 AM Andria Padilla MD [place]  [place] - [facility]   ,   Diabetes Assessment    Medic Alert ID: No  Meal Planning: Plate Method, Avoidance of concentrated sweets   How often do you test your blood sugar?: Daily   Do you have barriers with adherence to non-pharmacologic self-management interventions?  (Nutrition/Exercise/Self-Monitoring): No   Have you ever had to go to the ED for symptoms of low blood sugar?: No       No patient-reported symptoms   Do you have hyperglycemia symptoms?: No   Do you have hypoglycemia symptoms?: No   Last Blood Sugar Value: 125   Blood Sugar Monitoring Regimen: Morning Fasting, Before Meals   Blood Sugar Trends: Other (Comment: improved)        , and   COPD Assessment    Does the patient understand envrionmental exposure?: Yes  Is the patient able to verbalize Rescue vs. Long Acting medications?: Yes  Does the patient have a nebulizer?: Yes  Does the patient use a space with inhaled medications?: No     No patient-reported symptoms         Symptoms:     Symptom course: stable  Breathlessness: minimal exertion  Increase use of rapid acting/rescue inhaled medications?: No  Change in chronic cough?: No/At Baseline  Change in sputum?: No/At Baseline  Self Monitoring - SaO2: Yes (Comment: using oxygen at 3l/NC)  Have you had a recent diagnosis of pneumonia either by PCP or at a hospital?: No

## 2022-09-13 RX ORDER — ALLOPURINOL 300 MG/1
TABLET ORAL
Qty: 30 TABLET | Refills: 0 | Status: SHIPPED | OUTPATIENT
Start: 2022-09-13 | End: 2022-10-11

## 2022-09-19 DIAGNOSIS — F41.9 ANXIETY: ICD-10-CM

## 2022-09-19 DIAGNOSIS — F41.8 SITUATIONAL ANXIETY: ICD-10-CM

## 2022-09-19 DIAGNOSIS — E11.42 DIABETIC POLYNEUROPATHY ASSOCIATED WITH TYPE 2 DIABETES MELLITUS (HCC): ICD-10-CM

## 2022-09-19 RX ORDER — DULOXETIN HYDROCHLORIDE 60 MG/1
CAPSULE, DELAYED RELEASE ORAL
Qty: 30 CAPSULE | Refills: 0 | Status: SHIPPED | OUTPATIENT
Start: 2022-09-19 | End: 2022-10-11

## 2022-10-11 DIAGNOSIS — F41.9 ANXIETY: ICD-10-CM

## 2022-10-11 DIAGNOSIS — E11.42 DIABETIC POLYNEUROPATHY ASSOCIATED WITH TYPE 2 DIABETES MELLITUS (HCC): ICD-10-CM

## 2022-10-11 DIAGNOSIS — F41.8 SITUATIONAL ANXIETY: ICD-10-CM

## 2022-10-11 RX ORDER — ALLOPURINOL 300 MG/1
TABLET ORAL
Qty: 30 TABLET | Refills: 0 | Status: SHIPPED | OUTPATIENT
Start: 2022-10-11

## 2022-10-11 RX ORDER — DULOXETIN HYDROCHLORIDE 60 MG/1
CAPSULE, DELAYED RELEASE ORAL
Qty: 30 CAPSULE | Refills: 11 | Status: SHIPPED | OUTPATIENT
Start: 2022-10-11

## 2022-10-11 RX ORDER — ATORVASTATIN CALCIUM 40 MG/1
TABLET, FILM COATED ORAL
Qty: 30 TABLET | Refills: 0 | Status: SHIPPED | OUTPATIENT
Start: 2022-10-11

## 2022-10-11 RX ORDER — FUROSEMIDE 20 MG/1
TABLET ORAL
Qty: 30 TABLET | Refills: 0 | Status: SHIPPED | OUTPATIENT
Start: 2022-10-11

## 2022-10-17 ENCOUNTER — CARE COORDINATION (OUTPATIENT)
Dept: CARE COORDINATION | Age: 67
End: 2022-10-17

## 2022-10-17 NOTE — CARE COORDINATION
ACM attempted outreach. Left message. Contact information for call back provided. Plan    (Eligible for RPM -assess next call for needs)  Continue to assess for fall safety   Diabetes and copd support ongoing.   Smoking cessation

## 2022-11-02 ENCOUNTER — TELEPHONE (OUTPATIENT)
Dept: PULMONOLOGY | Age: 67
End: 2022-11-02

## 2022-11-02 NOTE — TELEPHONE ENCOUNTER
Patient did not show for 1yr ct fu (ct not completed  appointment  with Dr. Tristan Bar on 11/2/22    Same Day Cancellation: No    Patient rescheduled:  No    New appointment:     Patient was also no show on: na    LOV     Assessment:  Severe COPD       Not addressed today   Peripheral vascular disease s/p bypass surgery  Obesity  Cirrhosis  Tobacco abuse, 1-3 ppd X 42 years, average about 2 ppd for 80 pack year hx, now 1/2 ppd     Plan:   Incruse daily, did not tolerate Anoro, try adding flovent 110 BID today   PRN albuterol  LDCT for LCS in October 2022, see me after  COVID vaccine previously recommended

## 2022-11-08 RX ORDER — ALLOPURINOL 300 MG/1
TABLET ORAL
Qty: 30 TABLET | Refills: 0 | Status: SHIPPED | OUTPATIENT
Start: 2022-11-08

## 2022-11-08 RX ORDER — FUROSEMIDE 20 MG/1
TABLET ORAL
Qty: 30 TABLET | Refills: 0 | Status: SHIPPED | OUTPATIENT
Start: 2022-11-08

## 2022-11-08 RX ORDER — ATORVASTATIN CALCIUM 40 MG/1
TABLET, FILM COATED ORAL
Qty: 30 TABLET | Refills: 0 | Status: SHIPPED | OUTPATIENT
Start: 2022-11-08

## 2022-11-10 DIAGNOSIS — J41.8 MIXED SIMPLE AND MUCOPURULENT CHRONIC BRONCHITIS (HCC): ICD-10-CM

## 2022-11-10 RX ORDER — ALBUTEROL SULFATE 90 UG/1
AEROSOL, METERED RESPIRATORY (INHALATION)
Qty: 18 G | Refills: 0 | Status: SHIPPED | OUTPATIENT
Start: 2022-11-10

## 2022-11-10 RX ORDER — INSULIN GLARGINE 100 [IU]/ML
INJECTION, SOLUTION SUBCUTANEOUS
Qty: 15 ML | Refills: 0 | Status: SHIPPED | OUTPATIENT
Start: 2022-11-10

## 2022-11-15 DIAGNOSIS — I10 UNCONTROLLED HYPERTENSION: ICD-10-CM

## 2022-11-15 DIAGNOSIS — I10 BENIGN ESSENTIAL HTN: ICD-10-CM

## 2022-11-15 RX ORDER — METOPROLOL SUCCINATE 50 MG/1
TABLET, EXTENDED RELEASE ORAL
Qty: 90 TABLET | Refills: 3 | Status: SHIPPED | OUTPATIENT
Start: 2022-11-15

## 2022-11-16 ENCOUNTER — HOSPITAL ENCOUNTER (OUTPATIENT)
Dept: CT IMAGING | Age: 67
Discharge: HOME | End: 2022-11-16
Payer: MEDICARE

## 2022-11-16 DIAGNOSIS — Z87.891 HISTORY OF TOBACCO USE: ICD-10-CM

## 2022-11-16 PROCEDURE — 71271 CT THORAX LUNG CANCER SCR C-: CPT

## 2022-11-28 ENCOUNTER — CARE COORDINATION (OUTPATIENT)
Dept: CARE COORDINATION | Age: 67
End: 2022-11-28

## 2022-11-28 NOTE — CARE COORDINATION
ACM attempted outreach. Left message. Contact information for call back provided.  2nd missed call    Plan    Copd followup   Smoking cessation assistance  Fall safety   DB and HTN checks

## 2022-12-05 ENCOUNTER — CARE COORDINATION (OUTPATIENT)
Dept: CARE COORDINATION | Age: 67
End: 2022-12-05

## 2022-12-05 ASSESSMENT — ENCOUNTER SYMPTOMS: DYSPNEA ASSOCIATED WITH: EXERTION

## 2022-12-05 NOTE — CARE COORDINATION
Ambulatory Care Coordination Note  12/5/2022    ACC: Lexus Bautista RN    ACM completed outreach call for this patient. Several missed outreaches in past, but able to connect today. He reports he is doing well. No new falls. Reviewed copd and diabetic zone material. No exacerbations of COPD reported. He is checking pulse ox at home. Oxygen 1-3 liters/NC. 3 liters HS. He is aware to report increased need in rescue inhaler or symptoms changes such as fevers, cough, chills, wheezing, etc.Follow up with pulmonary planned in January. Bloods sugar well controlled. He is stating he is compliant with his meds and checking them twice a day. Lab Results   Component Value Date    LABA1C 6.8 05/08/2022    LABA1C 6.0 08/10/2021    LABA1C 6.4 02/05/2021     Lab Results   Component Value Date    .5 05/08/2022    .5 08/10/2021    .0 02/05/2021       HTN - He reports he is checking bp daily. He is able to identify high readings that he would report over 140/80 or low readings. He is aware to recheck high readings after resting/15 minutes and try again. Discussed reading variation at different times of day and with activity. 93-94 % baseline pulse ox on oxygen at home. Patient continues to smoke 1 pack over 3 days. He has been educated on benefits of smoking cessation. Encourage again to reduce or stop smoking. No quit date at this time. Patient quit for a short time in past and then resumed again later. [facility] plans to completed care coordination at this time. Appropriate follow ups scheduled. COPD and diabetes education completed. No additional needs identified at this time. >6 months on Care coordination     Offered patient enrollment in the Remote Patient Monitoring (RPM) program for in-home monitoring: NA.     Lab Results       None            Care Coordination Interventions    Referral from Primary Care Provider: No  Suggested Interventions and Community Resources  Grief Counselor: Not Started  Other Services or Interventions: 5/6/22- mailed out ACP docs          Goals Addressed                   This Visit's Progress       Care Coordination     COMPLETED: Medication Management        I will take my medication as directed. I will notify my provider of any problems with medications, like adverse effects or side effects. I will notify my provider/Care Coordinator if I am unable to afford my medications. I will notify my provider for advice before I stop taking any of my medication. I will notify PCP or ACM if my blood sugars are less than 80 or over 200. Barriers: lack of motivation, financial, lack of support, overwhelmed by complexity of regimen, and lack of education  Plan for overcoming my barriers: education and support   Confidence: 5/10  Anticipated Goal Completion Date: 10/22     He is checking sugars twice a day. Taking meds a prescribed. Lab Results   Component Value Date    LABA1C 6.8 05/08/2022    LABA1C 6.0 08/10/2021    LABA1C 6.4 02/05/2021     Lab Results   Component Value Date    .5 05/08/2022    .5 08/10/2021    .0 02/05/2021          COMPLETED: Reduce Falls    Improving     I will reduce my risk of falls by the following: Remove rugs or use non slip rugs  Install grab bars in bathroom  Use walking aids like cane or walker    Barriers: impairment:  physical: copd weakness   Plan for overcoming my barriers: education and support   Confidence: 8/10  Anticipated Goal Completion Date: 10/22    Fall safety discussed and handouts mailed. No new falls to report. 12/5/22 trb        COMPLETED: Wellness Goal   No change     Patient Self-Management Goal for Health Maintenance  Goal: I will chose a goal related to tobacco cessation:  I will think about my triggers for smoking, I will think about reasons why I should quit smoking, I will learn more about the harmful effects of tobacco, I will avoid triggers and negative influences, and I will try a nicotine replacement product or medication. Barriers: overwhelmed by complexity of regimen and stress  Plan for overcoming my barriers: education and support   Confidence: 5/10  Anticipated Goal Completion Date: 10/22    Smoking small amounts up again. 1 pack every 3 days. Educated again on health benefits and products to assist him. Smoking cessation materials received and encouraged cessation again on this call. Education about smoking and safety with use of oxygen discussed. 9/12/22 trb                     Prior to Admission medications    Medication Sig Start Date End Date Taking? Authorizing Provider   metoprolol succinate (TOPROL XL) 50 MG extended release tablet TAKE ONE TABLET BY MOUTH EVERY DAY 11/15/22   Ishaan Marquis, MD   LANKONSTANTIN SOLOSTAR 100 UNIT/ML injection pen INJECT 25 UNITS INTO THE SKIN DAILY WITH SUPPER 11/10/22   NAYELY Vasquez CNP   albuterol sulfate HFA (PROVENTIL;VENTOLIN;PROAIR) 108 (90 Base) MCG/ACT inhaler INHALE 2 PUFFS EVERY 6 HOURS AS NEEDED FOR WHEEZING 11/10/22   NAYELY Vasquez CNP   atorvastatin (LIPITOR) 40 MG tablet TAKE 1 TABLET BY MOUTH DAILY 11/8/22   Ishaan Marquis MD   allopurinol (ZYLOPRIM) 300 MG tablet TAKE 1 TABLET BY MOUTH DAILY 11/8/22   Ishaan aMrquis, MD   furosemide (LASIX) 20 MG tablet TAKE 1 TABLET BY MOUTH DAILY 11/8/22   Ishaan Marquis MD   DULoxetine (CYMBALTA) 60 MG extended release capsule TAKE ONE CAPSULE BY MOUTH EVERY DAY 10/11/22   Ishaan Marquis, MD   thiamine 100 MG tablet TAKE 1 TABLET BY MOUTH DAILY 8/23/22   Ishaan Marquis MD   nicotine polacrilex (NICORETTE) 2 MG gum Chew one piece of gum every 3 to 4  hours as needed for smoking cessation. No more than 5 pieces of gum in a day.  8/8/22   NAYELY Simon CNP   Alcohol Swabs PADS USE AS DIRECTED TO TEST AND INSULIN 8/3/22   Ishaan Marquis MD   fluticasone-umeclidin-vilant ([place] Sous) 100-62.5-25 MCG/INH AEPB Inhale 1 puff into the lungs in the morning. 8/2/22   Katja Downs MD   vitamin D (VITAMIN D3 ULTRA STRENGTH) 125 MCG (5000 UT) CAPS capsule Take 1 capsule by mouth daily 7/12/22   Katja Downs MD   dilTIAZem [facility] CD) 180 MG extended release capsule TAKE 2 CAPSULES ONCE DAILY 7/6/22   Katja Downs MD   FEROSUL 325 (69 Fe) MG tablet Take 1 tablet by mouth daily (with breakfast) 7/6/22   Katja Downs MD   folic acid (FOLVITE) 1 MG tablet TAKE 1 TABLET BY MOUTH DAILY 6/23/22   Katja Downs MD   ipratropium-albuterol (DUONEB) 0.5-2.5 (3) MG/3ML SOLN nebulizer solution Inhale 3 mLs into the lungs every 4 hours  Patient not taking: Reported on 8/8/2022 5/5/22   Jose Daniel Hancock MD   pantoprazole (PROTONIX) 40 MG tablet TAKE ONE TABLET BY MOUTH EVERY MORNING BEFORE BREAKFAST 4/13/22   [facility], APRN - CNP   TRUEplus Lancets 30G [address] [facility] TEST 2 TIMES DAILY 4/6/22   Katja Downs MD   TRUE METRIX BLOOD GLUCOSE TEST strip USE 1 STRIP IN VITRO ROUTE 2 TIMES DAILY 4/6/22   Katja Downs MD   PENTIPS 31G X 8 MM MISC USE ONCE DAILY WITH INJECTION 1/19/22   Katja Downs MD   ondansetron [facility]) 4 MG tablet Take 1 tablet by mouth every 6 hours as needed for Nausea or Vomiting 3/22/21   [facility], APRN - CNP   oxyCODONE (ROXICODONE) 5 MG immediate release tablet Take 10 mg by mouth every 8 hours as needed for Pain. Taking 0.5 tablet (10mg) q8h prn (noted 5/12/2022)    Historical Provider, MD   naloxone [facility]) 4 MG/0.1ML LIQD nasal spray 1 spray by Nasal route as needed for Opioid Reversal May repeat after 3 minutes if no or minimal response as directed     Historical Provider, MD   gabapentin (NEURONTIN) 300 MG capsule Take 900 mg by mouth 3 times daily.   1/20/20   Historical Provider, MD   Blood Pressure Monitoring (BLOOD PRESSURE CUFF) MISC Please dispense insurance preference 7/18/19 Candance Senate, MD   Blood Glucose Monitoring Suppl (FREESTYLE FREEDOM LITE) w/Device KIT 1 kit by Does not apply route daily 12/14/18   NAYELY Walker - CNP   OXYGEN Inhale 3 L/min into the lungs nightly as needed (and daily prn)     Historical Provider, MD       Future Appointments   Date Time Provider Alina Friend   1/10/2023 11:20 AM Candance Senate, MD [facility] [place] - [facility]   1/31/2023  1:15 PM MD [facility] PULM [facility]   ,   Diabetes Assessment    Medic Alert ID: No  Meal Planning: Plate Method, Avoidance of concentrated sweets   How often do you test your blood sugar?: Daily   Do you have barriers with adherence to non-pharmacologic self-management interventions?  (Nutrition/Exercise/Self-Monitoring): No   Have you ever had to go to the ED for symptoms of low blood sugar?: No       No patient-reported symptoms   Do you have hyperglycemia symptoms?: No   Do you have hypoglycemia symptoms?: No   Last Blood Sugar Value: 126   Blood Sugar Monitoring Regimen: Once a Day   Blood Sugar Trends: No Change        , and   COPD Assessment    Does the patient understand envrionmental exposure?: Yes  Is the patient able to verbalize Rescue vs. Long Acting medications?: Yes  Does the patient have a nebulizer?: Yes  Does the patient use a space with inhaled medications?: No     No patient-reported symptoms         Symptoms:     Symptom course: stable  Breathlessness: exertion  Increase use of rapid acting/rescue inhaled medications?: No  Change in chronic cough?: No/At Baseline  Change in sputum?: No/At Baseline  Sputum characteristics: Yellow  Self Monitoring - SaO2: Yes (Comment: 93/94 % on 3l/nc HS and titrates down to 2-3 during day)  Have you had a recent diagnosis of pneumonia either by PCP or at a hospital?: No

## 2022-12-09 DIAGNOSIS — J41.8 MIXED SIMPLE AND MUCOPURULENT CHRONIC BRONCHITIS (HCC): ICD-10-CM

## 2022-12-09 RX ORDER — LANOLIN ALCOHOL/MO/W.PET/CERES
CREAM (GRAM) TOPICAL
Qty: 100 TABLET | Refills: 0 | Status: SHIPPED | OUTPATIENT
Start: 2022-12-09

## 2022-12-09 RX ORDER — ALBUTEROL SULFATE 90 UG/1
AEROSOL, METERED RESPIRATORY (INHALATION)
Qty: 18 G | Refills: 0 | Status: SHIPPED | OUTPATIENT
Start: 2022-12-09

## 2023-01-05 DIAGNOSIS — J41.8 MIXED SIMPLE AND MUCOPURULENT CHRONIC BRONCHITIS (HCC): ICD-10-CM

## 2023-01-05 RX ORDER — DILTIAZEM HYDROCHLORIDE 180 MG/1
CAPSULE, COATED, EXTENDED RELEASE ORAL
Qty: 60 CAPSULE | Refills: 0 | Status: SHIPPED | OUTPATIENT
Start: 2023-01-05

## 2023-01-05 RX ORDER — FERROUS SULFATE 325(65) MG
TABLET ORAL
Qty: 30 TABLET | Refills: 0 | Status: SHIPPED | OUTPATIENT
Start: 2023-01-05

## 2023-01-05 RX ORDER — ALBUTEROL SULFATE 90 UG/1
AEROSOL, METERED RESPIRATORY (INHALATION)
Qty: 18 G | Refills: 0 | Status: SHIPPED | OUTPATIENT
Start: 2023-01-05

## 2023-01-05 RX ORDER — ONDANSETRON 4 MG/1
4 TABLET, FILM COATED ORAL EVERY 6 HOURS PRN
Qty: 30 TABLET | Refills: 0 | Status: SHIPPED | OUTPATIENT
Start: 2023-01-05

## 2023-01-10 ENCOUNTER — OFFICE VISIT (OUTPATIENT)
Dept: FAMILY MEDICINE CLINIC | Age: 68
End: 2023-01-10
Payer: MEDICARE

## 2023-01-10 VITALS
BODY MASS INDEX: 32.73 KG/M2 | DIASTOLIC BLOOD PRESSURE: 80 MMHG | OXYGEN SATURATION: 97 % | SYSTOLIC BLOOD PRESSURE: 130 MMHG | HEART RATE: 71 BPM | WEIGHT: 209 LBS

## 2023-01-10 DIAGNOSIS — Z79.4 TYPE 2 DIABETES MELLITUS WITH HYPERGLYCEMIA, WITH LONG-TERM CURRENT USE OF INSULIN (HCC): ICD-10-CM

## 2023-01-10 DIAGNOSIS — Z12.5 SCREENING FOR PROSTATE CANCER: ICD-10-CM

## 2023-01-10 DIAGNOSIS — K70.30 ALCOHOLIC CIRRHOSIS OF LIVER WITHOUT ASCITES (HCC): ICD-10-CM

## 2023-01-10 DIAGNOSIS — J41.8 MIXED SIMPLE AND MUCOPURULENT CHRONIC BRONCHITIS (HCC): ICD-10-CM

## 2023-01-10 DIAGNOSIS — K70.10 CHRONIC ALCOHOLIC HEPATITIS: ICD-10-CM

## 2023-01-10 DIAGNOSIS — G89.29 CHRONIC PAIN OF RIGHT KNEE: ICD-10-CM

## 2023-01-10 DIAGNOSIS — R05.4 COUGH SYNCOPE: ICD-10-CM

## 2023-01-10 DIAGNOSIS — E11.42 DIABETIC POLYNEUROPATHY ASSOCIATED WITH TYPE 2 DIABETES MELLITUS (HCC): ICD-10-CM

## 2023-01-10 DIAGNOSIS — M17.11 PRIMARY OSTEOARTHRITIS OF RIGHT KNEE: ICD-10-CM

## 2023-01-10 DIAGNOSIS — K74.60 LIVER CIRRHOSIS SECONDARY TO NASH (HCC): ICD-10-CM

## 2023-01-10 DIAGNOSIS — Z00.00 INITIAL MEDICARE ANNUAL WELLNESS VISIT: Primary | ICD-10-CM

## 2023-01-10 DIAGNOSIS — K75.81 LIVER CIRRHOSIS SECONDARY TO NASH (HCC): ICD-10-CM

## 2023-01-10 DIAGNOSIS — M25.561 CHRONIC PAIN OF RIGHT KNEE: ICD-10-CM

## 2023-01-10 DIAGNOSIS — M62.08 DIASTASIS RECTI: ICD-10-CM

## 2023-01-10 DIAGNOSIS — E11.65 TYPE 2 DIABETES MELLITUS WITH HYPERGLYCEMIA, WITH LONG-TERM CURRENT USE OF INSULIN (HCC): ICD-10-CM

## 2023-01-10 DIAGNOSIS — R55 COUGH SYNCOPE: ICD-10-CM

## 2023-01-10 DIAGNOSIS — K59.04 CHRONIC IDIOPATHIC CONSTIPATION: ICD-10-CM

## 2023-01-10 PROCEDURE — G8484 FLU IMMUNIZE NO ADMIN: HCPCS | Performed by: FAMILY MEDICINE

## 2023-01-10 PROCEDURE — 99214 OFFICE O/P EST MOD 30 MIN: CPT | Performed by: FAMILY MEDICINE

## 2023-01-10 PROCEDURE — 3075F SYST BP GE 130 - 139MM HG: CPT | Performed by: FAMILY MEDICINE

## 2023-01-10 PROCEDURE — 3017F COLORECTAL CA SCREEN DOC REV: CPT | Performed by: FAMILY MEDICINE

## 2023-01-10 PROCEDURE — 3023F SPIROM DOC REV: CPT | Performed by: FAMILY MEDICINE

## 2023-01-10 PROCEDURE — 3079F DIAST BP 80-89 MM HG: CPT | Performed by: FAMILY MEDICINE

## 2023-01-10 PROCEDURE — G8427 DOCREV CUR MEDS BY ELIG CLIN: HCPCS | Performed by: FAMILY MEDICINE

## 2023-01-10 PROCEDURE — G8417 CALC BMI ABV UP PARAM F/U: HCPCS | Performed by: FAMILY MEDICINE

## 2023-01-10 PROCEDURE — 3046F HEMOGLOBIN A1C LEVEL >9.0%: CPT | Performed by: FAMILY MEDICINE

## 2023-01-10 PROCEDURE — 1123F ACP DISCUSS/DSCN MKR DOCD: CPT | Performed by: FAMILY MEDICINE

## 2023-01-10 PROCEDURE — 36415 COLL VENOUS BLD VENIPUNCTURE: CPT | Performed by: FAMILY MEDICINE

## 2023-01-10 PROCEDURE — 2022F DILAT RTA XM EVC RTNOPTHY: CPT | Performed by: FAMILY MEDICINE

## 2023-01-10 PROCEDURE — 4004F PT TOBACCO SCREEN RCVD TLK: CPT | Performed by: FAMILY MEDICINE

## 2023-01-10 PROCEDURE — G0438 PPPS, INITIAL VISIT: HCPCS | Performed by: FAMILY MEDICINE

## 2023-01-10 ASSESSMENT — PATIENT HEALTH QUESTIONNAIRE - PHQ9
SUM OF ALL RESPONSES TO PHQ QUESTIONS 1-9: 0
1. LITTLE INTEREST OR PLEASURE IN DOING THINGS: 0
SUM OF ALL RESPONSES TO PHQ9 QUESTIONS 1 & 2: 0
SUM OF ALL RESPONSES TO PHQ QUESTIONS 1-9: 0
2. FEELING DOWN, DEPRESSED OR HOPELESS: 0

## 2023-01-10 ASSESSMENT — LIFESTYLE VARIABLES
HOW MANY STANDARD DRINKS CONTAINING ALCOHOL DO YOU HAVE ON A TYPICAL DAY: 1 OR 2
HAS A RELATIVE, FRIEND, DOCTOR, OR ANOTHER HEALTH PROFESSIONAL EXPRESSED CONCERN ABOUT YOUR DRINKING OR SUGGESTED YOU CUT DOWN: 0
HOW OFTEN DURING THE LAST YEAR HAVE YOU NEEDED AN ALCOHOLIC DRINK FIRST THING IN THE MORNING TO GET YOURSELF GOING AFTER A NIGHT OF HEAVY DRINKING: 0
HOW OFTEN DURING THE LAST YEAR HAVE YOU HAD A FEELING OF GUILT OR REMORSE AFTER DRINKING: 0
HOW OFTEN DURING THE LAST YEAR HAVE YOU BEEN UNABLE TO REMEMBER WHAT HAPPENED THE NIGHT BEFORE BECAUSE YOU HAD BEEN DRINKING: 0
HOW OFTEN DO YOU HAVE A DRINK CONTAINING ALCOHOL: 2-3 TIMES A WEEK
HOW OFTEN DURING THE LAST YEAR HAVE YOU FOUND THAT YOU WERE NOT ABLE TO STOP DRINKING ONCE YOU HAD STARTED: 0
HAVE YOU OR SOMEONE ELSE BEEN INJURED AS A RESULT OF YOUR DRINKING: 0
HOW OFTEN DURING THE LAST YEAR HAVE YOU FAILED TO DO WHAT WAS NORMALLY EXPECTED FROM YOU BECAUSE OF DRINKING: 0

## 2023-01-10 NOTE — PATIENT INSTRUCTIONS
Personalized Preventive Plan for Sunitha Garcia - 1/10/2023  Medicare offers a range of preventive health benefits. Some of the tests and screenings are paid in full while other may be subject to a deductible, co-insurance, and/or copay. Some of these benefits include a comprehensive review of your medical history including lifestyle, illnesses that may run in your family, and various assessments and screenings as appropriate. After reviewing your medical record and screening and assessments performed today your provider may have ordered immunizations, labs, imaging, and/or referrals for you. A list of these orders (if applicable) as well as your Preventive Care list are included within your After Visit Summary for your review. Other Preventive Recommendations:    A preventive eye exam performed by an eye specialist is recommended every 1-2 years to screen for glaucoma; cataracts, macular degeneration, and other eye disorders. A preventive dental visit is recommended every 6 months. Try to get at least 150 minutes of exercise per week or 10,000 steps per day on a pedometer . Order or download the FREE \"Exercise & Physical Activity: Your Everyday Guide\" from [employer]. Call 4-351.372.3675 or search [employer] online. You need 1620-5172 mg of calcium and 4425-5687 IU of vitamin D per day. It is possible to meet your calcium requirement with diet alone, but a vitamin D supplement is usually necessary to meet this goal.  When exposed to the sun, use a sunscreen that protects against both UVA and UVB radiation with an SPF of 30 or greater. Reapply every 2 to 3 hours or after sweating, drying off with a towel, or swimming. Always wear a seat belt when traveling in a car. Always wear a helmet when riding a bicycle or motorcycle.

## 2023-01-10 NOTE — PROGRESS NOTES
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  Recommended screening schedule for the next 5-10 years is provided to the patient in written form: see Patient Instructions/AVS.     No follow-ups on file. Subjective       Patient's complete Health Risk Assessment and screening values have been reviewed and are found in Flowsheets. The following problems were reviewed today and where indicated follow up appointments were made and/or referrals ordered. Positive Risk Factor Screenings with Interventions:    Fall Risk:  Do you feel unsteady or are you worried about falling? : no  2 or more falls in past year?: (!) yes  Fall with injury in past year?: (!) yes             Opioid Risk: (High risk score ?55) Opioid risk score: 55    Last PDMP Samuel as Reviewed:  Review User Review Instant Review Result     @        Last Controlled Substance Monitoring Documentation      [address] [facility] Office Visit from 6/25/2018 in [address] [place] [address] The Prescription Monitoring Report for this patient was reviewed today.  filed at 06/25/2018 0084   Periodic Controlled Substance Monitoring No signs of potential drug abuse or diversion identified. filed at 06/25/2018 1350                 Weight and Activity:  Physical Activity: Inactive    Days of Exercise per Week: 0 days    Minutes of Exercise per Session: 0 min     On average, how many days per week do you engage in moderate to strenuous exercise (like a brisk walk)?: 0 days  Have you lost any weight without trying in the past 3 months?: (!) Yes (states wasn't trying but since cutting out a lot of his snacking he has lost some weight.)         Inactivity Interventions:  Patient declined any further interventions or treatment  Obesity Interventions:  Patient declines any further evaluation or treatment            Vision Screen:  Do you have difficulty driving, watching TV, or doing any of your daily activities because of your eyesight?: (!) Yes  Have you had an eye exam within the past year?: (!) No  No results found. Safety:  Do you have working smoke detectors?: (!) No  Do you have either shower bars, grab bars, non-slip mats or non-slip surfaces in your shower or bathtub?: (!) No   Advanced Directives:  Do you have a Living Will?: (!) No  Tobacco Use:  Tobacco Use: High Risk    Smoking Tobacco Use: Some Days    Smokeless Tobacco Use: Former    Passive Exposure: Not on file     E-cigarette/Vaping       Questions Responses    E-cigarette/Vaping Use Former User    Start Date     Passive Exposure     Quit Date     Counseling Given     Comments Mid-Size or Vape Pen                        Objective   Vitals:    01/10/23 1123   BP: 130/80   Pulse: 71   SpO2: 97%   Weight: 209 lb (94.8 kg)      Body mass index is 32.73 kg/m².                Allergies   Allergen Reactions    Lisinopril Swelling    Ace Inhibitors Swelling    Influenza Vaccines      He states he was hospitalized when he received his first flu vaccine    Spiriva Handihaler [Tiotropium Bromide Monohydrate] Swelling     Tolerates both tudorza and incruse Vilanterol      Prior to Visit Medications    Medication Sig Taking? Authorizing Provider   FEROSUL 325 (65 Fe) MG tablet TAKE 1 TABLET BY MOUTH DAILY WITH BREAKFAST Yes NAYELY Kendall - CNP   ondansetron (ZOFRAN) 4 MG tablet Take 1 tablet by mouth every 6 hours as needed for Nausea or Vomiting Yes James List, APRN - KATHERINE   dilTIAZem (CARDIZEM CD) 180 MG extended release capsule TAKE 2 CAPSULES ONCE DAILY Yes James List, APRN - CNP   albuterol sulfate HFA (PROVENTIL;VENTOLIN;PROAIR) 108 (90 Base) MCG/ACT inhaler INHALE 2 PUFFS EVERY 6 HOURS AS NEEDED FOR WHEEZING Yes James List, NAYELY - CNP   thiamine 100 MG tablet TAKE 1 TABLET BY MOUTH DAILY Yes James List, NAYELY - CNP   metoprolol succinate (TOPROL XL) 50 MG extended release tablet TAKE ONE TABLET BY MOUTH EVERY DAY Yes Angeline Jameson MD   LANTUS SOLOSTAR 100 UNIT/ML injection pen INJECT 25 UNITS INTO THE SKIN DAILY WITH SUPPER Yes James Hyde, NAYELY - CNP   atorvastatin (LIPITOR) 40 MG tablet TAKE 1 TABLET BY MOUTH DAILY Yes Angeline Jameson MD   allopurinol (ZYLOPRIM) 300 MG tablet TAKE 1 TABLET BY MOUTH DAILY Yes Angeline Jameson MD   furosemide (LASIX) 20 MG tablet TAKE 1 TABLET BY MOUTH DAILY Yes Angeline Jameson MD   DULoxetine (CYMBALTA) 60 MG extended release capsule TAKE ONE CAPSULE BY MOUTH EVERY DAY Yes Angeline Jameson MD   nicotine polacrilex (NICORETTE) 2 MG gum Chew one piece of gum every 3 to 4  hours as needed for smoking cessation. No more than 5 pieces of gum in a day. Yes NAYELY Escobar CNP   Alcohol Swabs PADS USE AS DIRECTED TO TEST AND INSULIN Yes Angeline Jameson MD   fluticasone-umeclidin-vilant (TRELEGY ELLIPTA) 100-62.5-25 MCG/INH AEPB Inhale 1 puff into the lungs in the morning.  Yes Angeline Jameson MD   vitamin D (VITAMIN D3 ULTRA STRENGTH) 125 MCG (5000 UT) CAPS capsule Take 1 capsule by mouth daily Yes Chelsey Vora MD   folic acid (FOLVITE) 1 MG tablet TAKE 1 TABLET BY MOUTH DAILY Yes Chelsey Vora MD   ipratropium-albuterol (DUONEB) 0.5-2.5 (3) MG/3ML SOLN nebulizer solution Inhale 3 mLs into the lungs every 4 hours Yes Shyann Muro MD   pantoprazole (PROTONIX) 40 MG tablet TAKE ONE TABLET BY MOUTH EVERY MORNING BEFORE BREAKFAST Yes NAYELY Montero CNP   TRUEplus Lancets 30G MISC TEST 2 TIMES DAILY Yes Chelsey Vora MD   TRUE METRIX BLOOD GLUCOSE TEST strip USE 1 STRIP IN VITRO ROUTE 2 TIMES DAILY Yes Chelsey Vora MD   PENTIPS 31G X 8 MM MISC USE ONCE DAILY WITH INJECTION Yes Chelsey Vora MD   oxyCODONE (ROXICODONE) 5 MG immediate release tablet Take 10 mg by mouth every 8 hours as needed for Pain. Taking 0.5 tablet (10mg) q8h prn (noted 5/12/2022) Yes Historical Provider, MD   naloxone [facility]) 4 MG/0.1ML LIQD nasal spray 1 spray by Nasal route as needed for Opioid Reversal May repeat after 3 minutes if no or minimal response as directed  Yes Historical Provider, MD   gabapentin (NEURONTIN) 300 MG capsule Take 900 mg by mouth 3 times daily.   Yes Historical Provider, MD   Blood Pressure Monitoring (BLOOD PRESSURE CUFF) MISC Please dispense insurance preference Yes Chelsey Vora MD   Blood Glucose Monitoring Suppl (FREESTYLE FREEDOM LITE) w/Device KIT 1 kit by Does not apply route daily Yes NAYELY Montero CNP   OXYGEN Inhale 3 L/min into the lungs nightly as needed (and daily prn)  Yes Historical Provider, MD [place] (Including outside providers/suppliers regularly involved in providing care):   Patient Care Team:  Chelsey Vora MD as PCP - General (Family Medicine)  Chelsey Vora MD as PCP - [facility] Empaneled Provider  Brea Rios MD as Consulting Physician (Pulmonology)  Leo Francois RN as Nurse Viridiana Pérez MD (Internal Medicine)     Reviewed and updated this visit:  Tobacco  Allergies  Meds  Problems  Med Hx  Surg Hx  Soc Hx  Fam Hx          I, Jo Pennington LPN, 0/57/1930, performed the documented evaluation under the direct supervision of the attending physician. This encounter was performed under myRachelle MDs, direct supervision, 1/10/2023.

## 2023-01-10 NOTE — PROGRESS NOTES
Chief Complaint   Patient presents with    Hypertension    Diabetes    COPD        Internal Administration   First Dose      Second Dose           Last COVID Lab SARS-CoV-2, PCR (no units)   Date Value   10/26/2021 Not Detected     SARS-CoV-2, ZAHIDA (no units)   Date Value   08/19/2020 NOT DETECTED     SARS-CoV-2, NAAT (no units)   Date Value   05/05/2022 Not Detected             Wt Readings from Last 3 Encounters:   01/10/23 209 lb (94.8 kg)   07/11/22 215 lb (97.5 kg)   05/17/22 228 lb (103.4 kg)     BP Readings from Last 3 Encounters:   01/10/23 130/80   07/11/22 128/80   05/17/22 (!) 131/57      Lab Results   Component Value Date    LABA1C 6.8 05/08/2022    LABA1C 6.0 08/10/2021    LABA1C 6.4 02/05/2021       HPI:    He states that a week ago he feel due to his right knee giving out. He states that this does occur on a relatively regular basis. Informed him that it might be time to return to see ortho for another opinion since its been a couple years. He states that he is now having burning and tingling in his toes. Discussed neuropathy. He states that he continues to use oxygen at 3L nightly and will occasionally use it throughout the day. He states he did return to smoking off and on.     [] Patient has completed an advance directive  [x] Patient has NOT completed an advanced directive  [] Patient has a documented healthcare surrogate  [x] Patient does NOT have a documented healthcare surrogate  [] Discussed the importance of establishing and updating an advanced directive. Patient has questions at this time and those were answered. [x] Discussed the importance of establishing and updating an advanced directive. Patient does NOT have questions at this time.     Discussed with: [x] Patient            [] Family             [] Other caregiver    Patient's medications, allergies, past medical, surgical, social and family histories were reviewed and updated asappropriate on 1/10/2023 at 11:36 AM.    ROS:  Review of Systems    All other systems reviewed and are negative except as noted above on 1/10/2023 at 11:36 AM. Additional review of systems may be scanned into the media section ofthis medical record. Any responses requiring further intervention were pursued. Past Medical History:   Diagnosis Date    Bronchitis chronic     Chest pain     Chronic cough     Cirrhosis of liver (HCC) 2017    stage 4     COPD (chronic obstructive pulmonary disease) (HCC)     COPD (chronic obstructive pulmonary disease) (HCC)     Diabetes mellitus (HCC)     Gout     Hilar adenopathy     Hyperlipidemia     Hypertension     Knee pain, right     Numbness and tingling of leg     Osteoarthritis     Paresthesia of bilateral legs     PVD (peripheral vascular disease) (HCC)     Seizures (HCC)     ongoing, began after swine flu vaccination    Substance abuse ([facility] [address]      Family History   Problem Relation Age of Onset    Cancer Mother         Lung    Emphysema Father     Diabetes Sister     Hypertension Sister     Cancer Brother         throat    Asthma Neg Hx      Social History     Socioeconomic History    Marital status:      Spouse name: Not on file    Number of children: Not on file    Years of education: Not on file    Highest education level: Not on file   Occupational History    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     Comment: not working currently   Tobacco Use    Smoking status: Former     Packs/day: 1.00     Years: 42.00     Pack years: 42.00     Types: Cigarettes, Cigars     Start date: 2018     Quit date: 2022     Years since quittin.6    Smokeless tobacco: Former     Types: Snuff   Vaping Use    Vaping Use: Former    Substances: Always   Substance and Sexual Activity    Alcohol use:  Yes     Alcohol/week: 6.0 standard drinks     Types: 6 Cans of beer per week     Comment: patient states a 6 pack lasts him a week    Drug use: No    Sexual activity: Yes     Partners: Female   Other Topics Concern    Not on file   Social History Narrative    Not on file     Social Determinants of Health     Financial Resource Strain: Low Risk     Difficulty of Paying Living Expenses: Not very hard   Food Insecurity: No Food Insecurity    Worried About Running Out of Food in the Last Year: Never true    Ran Out of Food in the Last Year: Never true   Transportation Needs: Unknown    Lack of Transportation (Medical): Not on file    Lack of Transportation (Non-Medical): No   Physical Activity: Inactive    Days of Exercise per Week: 0 days    Minutes of Exercise per Session: 0 min   Stress: No Stress Concern Present    Feeling of Stress : Only a little   Social Connections: Socially Isolated    Frequency of Communication with Friends and Family: Twice a week    Frequency of Social Gatherings with Friends and Family: Twice a week    Attends Holiness Services: Never    Active Member of Clubs or Organizations: No    Attends Club or Organization Meetings: Not on file    Marital Status:    Intimate Partner Violence: Not on file   Housing Stability: [address] to Pay for Housing in the Last Year: No    Number of [place] in the Last Year: 1    Unstable Housing in the Last Year: No     Prior to Visit Medications    Medication Sig Taking?  Authorizing Provider   FEROSUL 325 (65 Fe) MG tablet TAKE 1 TABLET BY MOUTH DAILY WITH BREAKFAST Yes NAYELY Christensen CNP   ondansetron (ZOFRAN) 4 MG tablet Take 1 tablet by mouth every 6 hours as needed for Nausea or Vomiting Yes NAYELY Christensen CNP   dilTIAZem (CARDIZEM CD) 180 MG extended release capsule TAKE 2 CAPSULES ONCE DAILY Yes NAYELY Christensen CNP   albuterol sulfate HFA (PROVENTIL;VENTOLIN;PROAIR) 108 (90 Base) MCG/ACT inhaler INHALE 2 PUFFS EVERY 6 HOURS AS NEEDED FOR WHEEZING Yes NAYELY Christensen CNP   thiamine 100 MG tablet TAKE 1 TABLET BY MOUTH DAILY Yes NAYELY Christensen CNP   metoprolol succinate (TOPROL XL) 50 MG extended release tablet TAKE ONE TABLET BY MOUTH EVERY DAY Yes Janeth Huerta MD   LANTUS SOLOSTAR 100 UNIT/ML injection pen INJECT 25 UNITS INTO THE SKIN DAILY WITH SUPPER Yes NAYELY Sauceda CNP   atorvastatin (LIPITOR) 40 MG tablet TAKE 1 TABLET BY MOUTH DAILY Yes Janeth Huerta MD   allopurinol (ZYLOPRIM) 300 MG tablet TAKE 1 TABLET BY MOUTH DAILY Yes Janeth Huerta MD   furosemide (LASIX) 20 MG tablet TAKE 1 TABLET BY MOUTH DAILY Yes Janeth Huerta MD   DULoxetine (CYMBALTA) 60 MG extended release capsule TAKE ONE CAPSULE BY MOUTH EVERY DAY Yes Janeth Huerta MD   nicotine polacrilex (NICORETTE) 2 MG gum Chew one piece of gum every 3 to 4  hours as needed for smoking cessation. No more than 5 pieces of gum in a day. Yes NAYELY Aguilera CNP   Alcohol Swabs PADS USE AS DIRECTED TO TEST AND INSULIN Yes Janeth Huerta MD   fluticasone-umeclidin-vilant (TRELEGY ELLIPTA) 100-62.5-25 MCG/INH AEPB Inhale 1 puff into the lungs in the morning.  Yes Janeth Huerta MD   vitamin D (VITAMIN D3 ULTRA STRENGTH) 125 MCG (5000 UT) CAPS capsule Take 1 capsule by mouth daily Yes Janeth Huerta MD   folic acid (FOLVITE) 1 MG tablet TAKE 1 TABLET BY MOUTH DAILY Yes Janeth Huerta MD   ipratropium-albuterol (DUONEB) 0.5-2.5 (3) MG/3ML SOLN nebulizer solution Inhale 3 mLs into the lungs every 4 hours Yes Teto Wu MD   pantoprazole (PROTONIX) 40 MG tablet TAKE ONE TABLET BY MOUTH EVERY MORNING BEFORE BREAKFAST Yes NAYELY Aguilera CNP   TRUEplus Lancets 30G MISC TEST 2 TIMES DAILY Yes Janeth Huerta MD   TRUE METRIX BLOOD GLUCOSE TEST strip USE 1 STRIP IN VITRO ROUTE 2 TIMES DAILY Yes Janeth Huerta MD   PENTIPS 31G X 8 MM MISC USE ONCE DAILY WITH INJECTION Yes Janeth Huerta MD   oxyCODONE (ROXICODONE) 5 MG immediate release tablet Take 10 mg by mouth every 8 hours as needed for Pain. Taking 0.5 tablet (10mg) q8h prn (noted 5/12/2022) Yes Historical Provider, MD   naloxone [facility]) 4 MG/0.1ML LIQD nasal spray 1 spray by Nasal route as needed for Opioid Reversal May repeat after 3 minutes if no or minimal response as directed  Yes Historical Provider, MD   gabapentin (NEURONTIN) 300 MG capsule Take 900 mg by mouth 3 times daily. Yes Historical Provider, MD   Blood Pressure Monitoring (BLOOD PRESSURE CUFF) MISC Please dispense insurance preference Yes Angeline Jameson MD   Blood Glucose Monitoring Suppl (FREESTYLE FREEDOM LITE) w/Device KIT 1 kit by Does not apply route daily Yes Vergie Foots, APRN - CNP   OXYGEN Inhale 3 L/min into the lungs nightly as needed (and daily prn)  Yes Historical Provider, MD     Allergies   Allergen Reactions    Lisinopril Swelling    Ace Inhibitors Swelling    Influenza Vaccines      He states he was hospitalized when he received his first flu vaccine    Spiriva Handihaler [Tiotropium Bromide Monohydrate] Swelling     Tolerates both tudorza and incruse    Vilanterol        OBJECTIVE:  Estimated body mass index is 32.73 kg/m² as calculated from the following:    Height as of 5/20/22: 5' 7\" (1.702 m). Weight as of this encounter: 209 lb (94.8 kg). Vitals:    01/10/23 1123   BP: 130/80   Pulse: 71   SpO2: 97%   Weight: 209 lb (94.8 kg)       Physical Exam  Vitals and nursing note reviewed. Constitutional:       General: He is not in acute distress. Appearance: He is well-developed. He is not diaphoretic. HENT:      Head: Normocephalic and atraumatic. Right Ear: External ear normal.      Left Ear: External ear normal.      Nose: Nose normal.   Eyes:      General: Lids are normal. No scleral icterus. Right eye: No discharge. Left eye: No discharge. Pupils: Pupils are equal, round, and reactive to light. Neck:      Thyroid: No thyromegaly. Vascular: No JVD.    Cardiovascular:      Rate and Rhythm: Normal rate and regular rhythm. Heart sounds: Normal heart sounds. Pulmonary:      Effort: Pulmonary effort is normal. No respiratory distress. Breath sounds: Decreased breath sounds present. Abdominal:      Palpations: Abdomen is soft. There is no hepatomegaly or splenomegaly. Tenderness: There is no abdominal tenderness. Comments: Diastases recti   Musculoskeletal:      Right lower leg: No edema. Left lower leg: No edema. Skin:     General: Skin is warm and dry. Coloration: Skin is not pale. Findings: Bruising (Bilateral upper extremities) present. No erythema or rash. Comments: Turgor normal   Neurological:      Mental Status: He is oriented to person, place, and time. Psychiatric:         Mood and Affect: Mood normal.         Behavior: Behavior normal.         Thought Content: Thought content normal.         Judgment: Judgment normal.            ASSESSMENT PLAN      Diagnosis Orders   1. Initial Medicare annual wellness visit        2. Type 2 diabetes mellitus with hyperglycemia, with long-term current use of insulin (HCC)  Hemoglobin A1C      3. Screening for prostate cancer  PSA Screening      4. Chronic pain of right knee  Evelina Wilson, , Orthopedics and Sports Medicine (Hip; Knee; Shoulder), [facility]      5. Primary osteoarthritis of right knee        6. Cough syncope        7. Diastasis recti        8. 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 ([address]        9. Chronic idiopathic constipation        10. Alcoholic cirrhosis of liver without ascites ([address]        11. Chronic alcoholic hepatitis        12. Diabetic polyneuropathy associated with type 2 diabetes mellitus ([address]        13. Mixed simple and mucopurulent chronic bronchitis ([address]        In addition to the AWV patient had fall on. He has ecchymosis on both arms as well as scabbed lacerations of the left forearm.   Patient asking about a bulging in his abdominal wall when he increases intra-abdominal pressure. Explained a diastases recti. Patient states that his knee bothers him was told previously could not be operated because of diabetes. His last A1c was well under 8. Be repeated today. States its been a few years since he had that opinion. Return him to orthopedics for updated opinion. He mentions that when he coughs now he has times where he feels like he is going to pass out. Mentioned and discussed cough syncope. Having numbness and tingling still of his legs and feet. Breathing is fair. Continue to monitor LFTs. Constipation is at baseline. Follow-up 6 months. Patient should call the office immediately with new or ongoing signs or symptoms or worsening, or proceed to the emergency room. No changes in past medical history, past surgical history, social history, or family history were noted during the patient encounter unless specifically listed above. All updates of past medical history, past surgical history, social history, or family history were reviewed personally by me during the office visit. All problems listed in the assessment are stable unless noted otherwise. Medication profile reviewed personally by me during the visit. Medication side effects and possible impairments from medications were discussed as applicable. Unless stated otherwise, we will continue current medications as listed in the medication review report contained in the patient's medical record. This document was prepared by a combination of typing and transcription through a voice recognition software.                 Scribe attestation: Priscilla Moon RN, am scribing for and in the presence of Unice Dakin, MD. Electronically signed by Alice Matos RN on 1/10/2023 at 11:36 AM      Provider attestation:     I, Dr. Karl Curiel, personally performed the services described in this documentation, as scribed by the above signed scribe in my presence, and it is both accurate and complete. I agree with the ROS and Past Histories independently gathered by the clinical support staff and the remaining scribed note accurately describes my personal service to the patient.       1/10/2023    12:12 PM

## 2023-01-11 LAB
ESTIMATED AVERAGE GLUCOSE: 108.3 MG/DL
HBA1C MFR BLD: 5.4 %
PROSTATE SPECIFIC ANTIGEN: 1.64 NG/ML (ref 0–4)

## 2023-01-11 RX ORDER — INSULIN GLARGINE 100 [IU]/ML
INJECTION, SOLUTION SUBCUTANEOUS
Qty: 15 ML | Refills: 0
Start: 2023-01-11

## 2023-01-16 RX ORDER — ATORVASTATIN CALCIUM 40 MG/1
TABLET, FILM COATED ORAL
Qty: 90 TABLET | Refills: 3 | Status: SHIPPED | OUTPATIENT
Start: 2023-01-16

## 2023-01-16 RX ORDER — ALLOPURINOL 300 MG/1
TABLET ORAL
Qty: 90 TABLET | Refills: 3 | Status: SHIPPED | OUTPATIENT
Start: 2023-01-16

## 2023-01-24 DIAGNOSIS — J41.8 MIXED SIMPLE AND MUCOPURULENT CHRONIC BRONCHITIS (HCC): ICD-10-CM

## 2023-01-24 RX ORDER — ALBUTEROL SULFATE 90 UG/1
AEROSOL, METERED RESPIRATORY (INHALATION)
Qty: 18 G | Refills: 3 | Status: SHIPPED | OUTPATIENT
Start: 2023-01-24

## 2023-01-24 NOTE — TELEPHONE ENCOUNTER
Future Appointments   Date Time Provider Alina Friend   1/31/2023  1:15 PM Ana Hawk MD [facility]   7/12/2023 11:00 AM Taqueria Guerrero MD [facility] [address]

## 2023-01-31 ENCOUNTER — TELEPHONE (OUTPATIENT)
Dept: PULMONOLOGY | Age: 68
End: 2023-01-31

## 2023-01-31 NOTE — TELEPHONE ENCOUNTER
Patient cancelled appointment on 1/31/23 with Dr. Regina Whitten for 1 year CT f/u. Reason: patient not feeling well, has stomach bug    Patient did reschedule appointment. Appointment rescheduled for 3/21/23. Offered 2/24/23 at 9am but patient needed an afternoon appt due to transportation.       Last OV 11/4/21    Assessment:  Severe COPD       Not addressed today   Peripheral vascular disease s/p bypass surgery  Obesity  Cirrhosis  Tobacco abuse, 1-3 ppd X 42 years, average about 2 ppd for 80 pack year hx, now 1/2 ppd     Plan:   Incruse daily, did not tolerate Anoro, try adding flovent 110 BID today   PRN albuterol  LDCT for LCS in October 2022, see me after  COVID vaccine previously recommended

## 2023-02-07 RX ORDER — FERROUS SULFATE 325(65) MG
TABLET ORAL
Qty: 90 TABLET | Refills: 3 | Status: SHIPPED | OUTPATIENT
Start: 2023-02-07

## 2023-02-07 NOTE — TELEPHONE ENCOUNTER
Future Appointments   Date Time Provider Alina Friend   3/21/2023  3:30 PM Thai Montemayor [facility]   7/12/2023 11:00 AM Ishmael Tinajero MD [facility] [address]

## 2023-02-13 RX ORDER — DILTIAZEM HYDROCHLORIDE 180 MG/1
CAPSULE, COATED, EXTENDED RELEASE ORAL
Qty: 60 CAPSULE | Refills: 0 | Status: SHIPPED | OUTPATIENT
Start: 2023-02-13

## 2023-02-13 RX ORDER — FUROSEMIDE 20 MG/1
TABLET ORAL
Qty: 30 TABLET | Refills: 0 | Status: SHIPPED | OUTPATIENT
Start: 2023-02-13

## 2023-02-28 ENCOUNTER — APPOINTMENT (OUTPATIENT)
Dept: GENERAL RADIOLOGY | Age: 68
End: 2023-02-28
Payer: MEDICARE

## 2023-02-28 ENCOUNTER — APPOINTMENT (OUTPATIENT)
Dept: CT IMAGING | Age: 68
End: 2023-02-28
Payer: MEDICARE

## 2023-02-28 ENCOUNTER — HOSPITAL ENCOUNTER (INPATIENT)
Age: 68
LOS: 2 days | Discharge: HOME | End: 2023-03-03
Attending: STUDENT IN AN ORGANIZED HEALTH CARE EDUCATION/TRAINING PROGRAM | Admitting: INTERNAL MEDICINE
Payer: MEDICARE

## 2023-02-28 DIAGNOSIS — J96.11 CHRONIC RESPIRATORY FAILURE WITH HYPOXIA (HCC): ICD-10-CM

## 2023-02-28 DIAGNOSIS — L03.90 SEPSIS DUE TO CELLULITIS (HCC): ICD-10-CM

## 2023-02-28 DIAGNOSIS — J44.9 CHRONIC OBSTRUCTIVE PULMONARY DISEASE, UNSPECIFIED COPD TYPE (HCC): ICD-10-CM

## 2023-02-28 DIAGNOSIS — R55 SYNCOPE AND COLLAPSE: ICD-10-CM

## 2023-02-28 DIAGNOSIS — A41.9 SEPSIS DUE TO CELLULITIS (HCC): ICD-10-CM

## 2023-02-28 DIAGNOSIS — N17.9 AKI (ACUTE KIDNEY INJURY) (HCC): ICD-10-CM

## 2023-02-28 DIAGNOSIS — J44.1 COPD EXACERBATION (HCC): ICD-10-CM

## 2023-02-28 DIAGNOSIS — L03.113 CELLULITIS OF RIGHT UPPER EXTREMITY: Primary | ICD-10-CM

## 2023-02-28 LAB
A/G RATIO: 0.8 (ref 1.1–2.2)
ALBUMIN SERPL-MCNC: 3.3 G/DL (ref 3.4–5)
ALP BLD-CCNC: 107 U/L (ref 40–129)
ALT SERPL-CCNC: 15 U/L (ref 10–40)
ANION GAP SERPL CALCULATED.3IONS-SCNC: 12 MMOL/L (ref 3–16)
AST SERPL-CCNC: 30 U/L (ref 15–37)
BASE EXCESS VENOUS: 2 MMOL/L (ref -3–3)
BASOPHILS ABSOLUTE: 0.1 K/UL (ref 0–0.2)
BASOPHILS RELATIVE PERCENT: 0.4 %
BILIRUB SERPL-MCNC: 0.6 MG/DL (ref 0–1)
BUN BLDV-MCNC: 25 MG/DL (ref 7–20)
CALCIUM SERPL-MCNC: 9.9 MG/DL (ref 8.3–10.6)
CARBOXYHEMOGLOBIN: 2.7 % (ref 0–1.5)
CHLORIDE BLD-SCNC: 96 MMOL/L (ref 99–110)
CO2: 29 MMOL/L (ref 21–32)
CREAT SERPL-MCNC: 1.8 MG/DL (ref 0.8–1.3)
EKG ATRIAL RATE: 103 BPM
EKG DIAGNOSIS: NORMAL
EKG P AXIS: 59 DEGREES
EKG P-R INTERVAL: 210 MS
EKG Q-T INTERVAL: 342 MS
EKG QRS DURATION: 84 MS
EKG QTC CALCULATION (BAZETT): 448 MS
EKG R AXIS: -47 DEGREES
EKG T AXIS: 74 DEGREES
EKG VENTRICULAR RATE: 103 BPM
EOSINOPHILS ABSOLUTE: 0 K/UL (ref 0–0.6)
EOSINOPHILS RELATIVE PERCENT: 0 %
GFR SERPL CREATININE-BSD FRML MDRD: 41 ML/MIN/{1.73_M2}
GLUCOSE BLD-MCNC: 181 MG/DL (ref 70–99)
GLUCOSE BLD-MCNC: 217 MG/DL (ref 70–99)
HCO3 VENOUS: 26.4 MMOL/L (ref 23–29)
HCT VFR BLD CALC: 42.6 % (ref 40.5–52.5)
HEMOGLOBIN: 14 G/DL (ref 13.5–17.5)
INFLUENZA A: NOT DETECTED
INFLUENZA B: NOT DETECTED
INR BLD: 1.25 (ref 0.87–1.14)
LIPASE: 12 U/L (ref 13–60)
LYMPHOCYTES ABSOLUTE: 1.6 K/UL (ref 1–5.1)
LYMPHOCYTES RELATIVE PERCENT: 5.4 %
MAGNESIUM: 1.2 MG/DL (ref 1.8–2.4)
MCH RBC QN AUTO: 31.5 PG (ref 26–34)
MCHC RBC AUTO-ENTMCNC: 32.9 G/DL (ref 31–36)
MCV RBC AUTO: 95.5 FL (ref 80–100)
METHEMOGLOBIN VENOUS: 0.3 %
MONOCYTES ABSOLUTE: 1.4 K/UL (ref 0–1.3)
MONOCYTES RELATIVE PERCENT: 4.8 %
NEUTROPHILS ABSOLUTE: 26.3 K/UL (ref 1.7–7.7)
NEUTROPHILS RELATIVE PERCENT: 89.4 %
O2 CONTENT, VEN: 14 VOL %
O2 SAT, VEN: 69 %
O2 THERAPY: ABNORMAL
PCO2, VEN: 40.6 MMHG (ref 40–50)
PDW BLD-RTO: 14.8 % (ref 12.4–15.4)
PERFORMED ON: ABNORMAL
PH VENOUS: 7.43 (ref 7.35–7.45)
PLATELET # BLD: 257 K/UL (ref 135–450)
PMV BLD AUTO: 7.9 FL (ref 5–10.5)
PO2, VEN: 35.2 MMHG (ref 25–40)
POTASSIUM REFLEX MAGNESIUM: 3.5 MMOL/L (ref 3.5–5.1)
PRO-BNP: 3481 PG/ML (ref 0–124)
PROTHROMBIN TIME: 15.5 SEC (ref 11.7–14.5)
RBC # BLD: 4.46 M/UL (ref 4.2–5.9)
SARS-COV-2 RNA, RT PCR: NOT DETECTED
SODIUM BLD-SCNC: 137 MMOL/L (ref 136–145)
TCO2 CALC VENOUS: 28 MMOL/L
TOTAL CK: 486 U/L (ref 39–308)
TOTAL PROTEIN: 7.7 G/DL (ref 6.4–8.2)
TROPONIN: <0.01 NG/ML
WBC # BLD: 29.5 K/UL (ref 4–11)

## 2023-02-28 PROCEDURE — 71045 X-RAY EXAM CHEST 1 VIEW: CPT

## 2023-02-28 PROCEDURE — 72125 CT NECK SPINE W/O DYE: CPT

## 2023-02-28 PROCEDURE — 84484 ASSAY OF TROPONIN QUANT: CPT

## 2023-02-28 PROCEDURE — 96375 TX/PRO/DX INJ NEW DRUG ADDON: CPT

## 2023-02-28 PROCEDURE — 71260 CT THORAX DX C+: CPT | Performed by: STUDENT IN AN ORGANIZED HEALTH CARE EDUCATION/TRAINING PROGRAM

## 2023-02-28 PROCEDURE — 93010 ELECTROCARDIOGRAM REPORT: CPT | Performed by: INTERNAL MEDICINE

## 2023-02-28 PROCEDURE — 82550 ASSAY OF CK (CPK): CPT

## 2023-02-28 PROCEDURE — 2580000003 HC RX 258: Performed by: STUDENT IN AN ORGANIZED HEALTH CARE EDUCATION/TRAINING PROGRAM

## 2023-02-28 PROCEDURE — 96366 THER/PROPH/DIAG IV INF ADDON: CPT

## 2023-02-28 PROCEDURE — 82803 BLOOD GASES ANY COMBINATION: CPT

## 2023-02-28 PROCEDURE — 84145 PROCALCITONIN (PCT): CPT

## 2023-02-28 PROCEDURE — 85025 COMPLETE CBC W/AUTO DIFF WBC: CPT

## 2023-02-28 PROCEDURE — 36415 COLL VENOUS BLD VENIPUNCTURE: CPT

## 2023-02-28 PROCEDURE — 74177 CT ABD & PELVIS W/CONTRAST: CPT

## 2023-02-28 PROCEDURE — 83690 ASSAY OF LIPASE: CPT

## 2023-02-28 PROCEDURE — 87636 SARSCOV2 & INF A&B AMP PRB: CPT

## 2023-02-28 PROCEDURE — 96367 TX/PROPH/DG ADDL SEQ IV INF: CPT

## 2023-02-28 PROCEDURE — 83036 HEMOGLOBIN GLYCOSYLATED A1C: CPT

## 2023-02-28 PROCEDURE — 3209999900 CT LUMBAR SPINE TRAUMA RECONSTRUCTION

## 2023-02-28 PROCEDURE — 85610 PROTHROMBIN TIME: CPT

## 2023-02-28 PROCEDURE — 80053 COMPREHEN METABOLIC PANEL: CPT

## 2023-02-28 PROCEDURE — 96365 THER/PROPH/DIAG IV INF INIT: CPT

## 2023-02-28 PROCEDURE — 99285 EMERGENCY DEPT VISIT HI MDM: CPT

## 2023-02-28 PROCEDURE — 6360000002 HC RX W HCPCS: Performed by: STUDENT IN AN ORGANIZED HEALTH CARE EDUCATION/TRAINING PROGRAM

## 2023-02-28 PROCEDURE — 83735 ASSAY OF MAGNESIUM: CPT

## 2023-02-28 PROCEDURE — 6370000000 HC RX 637 (ALT 250 FOR IP): Performed by: STUDENT IN AN ORGANIZED HEALTH CARE EDUCATION/TRAINING PROGRAM

## 2023-02-28 PROCEDURE — 70450 CT HEAD/BRAIN W/O DYE: CPT

## 2023-02-28 PROCEDURE — 87150 DNA/RNA AMPLIFIED PROBE: CPT

## 2023-02-28 PROCEDURE — 93005 ELECTROCARDIOGRAM TRACING: CPT | Performed by: STUDENT IN AN ORGANIZED HEALTH CARE EDUCATION/TRAINING PROGRAM

## 2023-02-28 PROCEDURE — 6360000004 HC RX CONTRAST MEDICATION: Performed by: STUDENT IN AN ORGANIZED HEALTH CARE EDUCATION/TRAINING PROGRAM

## 2023-02-28 PROCEDURE — 87040 BLOOD CULTURE FOR BACTERIA: CPT

## 2023-02-28 PROCEDURE — 83880 ASSAY OF NATRIURETIC PEPTIDE: CPT

## 2023-02-28 PROCEDURE — 73090 X-RAY EXAM OF FOREARM: CPT

## 2023-02-28 RX ORDER — MAGNESIUM SULFATE IN WATER 40 MG/ML
2000 INJECTION, SOLUTION INTRAVENOUS ONCE
Status: COMPLETED | OUTPATIENT
Start: 2023-02-28 | End: 2023-03-01

## 2023-02-28 RX ORDER — 0.9 % SODIUM CHLORIDE 0.9 %
1000 INTRAVENOUS SOLUTION INTRAVENOUS ONCE
Status: COMPLETED | OUTPATIENT
Start: 2023-02-28 | End: 2023-03-01

## 2023-02-28 RX ORDER — MORPHINE SULFATE 2 MG/ML
2 INJECTION, SOLUTION INTRAMUSCULAR; INTRAVENOUS ONCE
Status: COMPLETED | OUTPATIENT
Start: 2023-02-28 | End: 2023-02-28

## 2023-02-28 RX ORDER — IPRATROPIUM BROMIDE AND ALBUTEROL SULFATE 2.5; .5 MG/3ML; MG/3ML
1 SOLUTION RESPIRATORY (INHALATION)
Status: DISCONTINUED | OUTPATIENT
Start: 2023-03-01 | End: 2023-02-28

## 2023-02-28 RX ORDER — CALCIUM CARBONATE 200(500)MG
500 TABLET,CHEWABLE ORAL 3 TIMES DAILY PRN
Status: DISCONTINUED | OUTPATIENT
Start: 2023-02-28 | End: 2023-03-03 | Stop reason: HOSPADM

## 2023-02-28 RX ORDER — DEXTROSE MONOHYDRATE 100 MG/ML
INJECTION, SOLUTION INTRAVENOUS CONTINUOUS PRN
Status: DISCONTINUED | OUTPATIENT
Start: 2023-02-28 | End: 2023-03-01

## 2023-02-28 RX ORDER — IPRATROPIUM BROMIDE AND ALBUTEROL SULFATE 2.5; .5 MG/3ML; MG/3ML
1 SOLUTION RESPIRATORY (INHALATION)
Status: DISCONTINUED | OUTPATIENT
Start: 2023-02-28 | End: 2023-03-01

## 2023-02-28 RX ORDER — MAGNESIUM SULFATE IN WATER 40 MG/ML
2000 INJECTION, SOLUTION INTRAVENOUS ONCE
Status: COMPLETED | OUTPATIENT
Start: 2023-02-28 | End: 2023-02-28

## 2023-02-28 RX ORDER — INSULIN LISPRO 100 [IU]/ML
0-8 INJECTION, SOLUTION INTRAVENOUS; SUBCUTANEOUS
Status: DISCONTINUED | OUTPATIENT
Start: 2023-02-28 | End: 2023-03-01

## 2023-02-28 RX ORDER — IPRATROPIUM BROMIDE AND ALBUTEROL SULFATE 2.5; .5 MG/3ML; MG/3ML
1 SOLUTION RESPIRATORY (INHALATION) ONCE
Status: COMPLETED | OUTPATIENT
Start: 2023-02-28 | End: 2023-02-28

## 2023-02-28 RX ORDER — INSULIN LISPRO 100 [IU]/ML
0-4 INJECTION, SOLUTION INTRAVENOUS; SUBCUTANEOUS NIGHTLY
Status: DISCONTINUED | OUTPATIENT
Start: 2023-02-28 | End: 2023-03-01

## 2023-02-28 RX ADMIN — IPRATROPIUM BROMIDE AND ALBUTEROL SULFATE 1 AMPULE: .5; 2.5 SOLUTION RESPIRATORY (INHALATION) at 23:14

## 2023-02-28 RX ADMIN — CALCIUM CARBONATE 500 MG: 500 TABLET, CHEWABLE ORAL at 19:29

## 2023-02-28 RX ADMIN — MORPHINE SULFATE 2 MG: 2 INJECTION, SOLUTION INTRAMUSCULAR; INTRAVENOUS at 22:34

## 2023-02-28 RX ADMIN — INSULIN LISPRO 2 UNITS: 100 INJECTION, SOLUTION INTRAVENOUS; SUBCUTANEOUS at 20:42

## 2023-02-28 RX ADMIN — VANCOMYCIN HYDROCHLORIDE 2000 MG: 10 INJECTION, POWDER, LYOPHILIZED, FOR SOLUTION INTRAVENOUS at 18:37

## 2023-02-28 RX ADMIN — MAGNESIUM SULFATE HEPTAHYDRATE 2000 MG: 40 INJECTION, SOLUTION INTRAVENOUS at 17:02

## 2023-02-28 RX ADMIN — IPRATROPIUM BROMIDE AND ALBUTEROL SULFATE 1 AMPULE: 2.5; .5 SOLUTION RESPIRATORY (INHALATION) at 17:02

## 2023-02-28 RX ADMIN — CEFEPIME 2000 MG: 2 INJECTION, POWDER, FOR SOLUTION INTRAVENOUS at 17:04

## 2023-02-28 RX ADMIN — IOPAMIDOL 85 ML: 755 INJECTION, SOLUTION INTRAVENOUS at 15:50

## 2023-02-28 RX ADMIN — SODIUM CHLORIDE 1000 ML: 9 INJECTION, SOLUTION INTRAVENOUS at 22:39

## 2023-02-28 ASSESSMENT — PAIN DESCRIPTION - PAIN TYPE: TYPE: ACUTE PAIN

## 2023-02-28 ASSESSMENT — PAIN - FUNCTIONAL ASSESSMENT: PAIN_FUNCTIONAL_ASSESSMENT: 0-10

## 2023-02-28 ASSESSMENT — PAIN SCALES - GENERAL
PAINLEVEL_OUTOF10: 9
PAINLEVEL_OUTOF10: 6
PAINLEVEL_OUTOF10: 7

## 2023-02-28 ASSESSMENT — PAIN DESCRIPTION - ORIENTATION
ORIENTATION: RIGHT;LOWER
ORIENTATION: MID
ORIENTATION: RIGHT

## 2023-02-28 ASSESSMENT — PAIN DESCRIPTION - LOCATION
LOCATION: ABDOMEN
LOCATION: ARM
LOCATION: ARM

## 2023-02-28 ASSESSMENT — PAIN DESCRIPTION - DESCRIPTORS: DESCRIPTORS: BURNING

## 2023-02-28 ASSESSMENT — PAIN DESCRIPTION - FREQUENCY: FREQUENCY: CONTINUOUS

## 2023-02-28 NOTE — ED NOTES
1505  12 lead ECG completed, given to Dr. Familia Porter for review     Michell Beltran  02/28/23 8104

## 2023-02-28 NOTE — ED PROVIDER NOTES
Emergency Department Encounter    Patient: Tyron Eden  MRN: [id number]  : 1955  Date of Evaluation: 2023  ED Provider:  Chelo Weston MD    Triage Chief Complaint:   Shortness of Breath (X2 days. Hx COPD; cp with inspiration; abrasion to Right elbow from fall a couple days ago. 125 solu-medrol; 1 Duoneb and 324 ASA given in route )    [place]:  Tyron Eden is a 78 y.o. male with history seen below presenting with complaints of chest pain, shortness of breath, recent episode of syncope when she fell and now has neck and back pain as well as right upper extremity pain. States the fall occurred several days ago and now he has cellulitis. Is unsure if he has had fevers. States he has had increased cough with mild sputum production. States he wears 3 L nasal cannula at night but not during the day but is having to wear his oxygen during the day now. Denies headache, blurred vision, focal deficits, motor or sensory changes. States he has no chest pain currently but has had intermittent intermittent chest pain over the past 3 to 4 days with shortness of breath. Denies radiation of the pain. States when the pain occurs it is moderate and lasts an unknown duration of time. States he has mild to moderate pain in the right side of his neck as well as over the lower back and right flank since the fall. Denies any motor or sensory changes, bowel or bladder changes other than some mild dysuria.     ROS - see HPI, below listed is current ROS at time of my eval:  At least 14 systems reviewed, negative other than HPI      Past Medical History:   Diagnosis Date    Bronchitis chronic     Chest pain     Chronic cough     Cirrhosis of liver ([facility] [address] 2017    stage 4     COPD (chronic obstructive pulmonary disease) (HCC)     COPD (chronic obstructive pulmonary disease) (HCC)     Diabetes mellitus (HCC)     Gout     Hilar adenopathy     Hyperlipidemia     Hypertension     Knee pain, right Numbness and tingling of leg     Osteoarthritis     Paresthesia of bilateral legs     PVD (peripheral vascular disease) (HCC)     Seizures (HCC)     ongoing, began after swine flu vaccination    Substance abuse [facility])      Past Surgical History:   Procedure Laterality Date    AORTO-FEMORAL BYPASS GRAFT  08/2020    AORTO-ILIAC BYPASS GRAFT N/A 8/24/2020    AORTOBIFEMORAL BYPASS GRAFT performed by Xiomara Burch MD at [address] [place] [address]  2/7/2011    bronch with EBUS    COLONOSCOPY N/A 7/23/2018    COLONOSCOPY WITH BIOPSY performed by Renetta Zambrano MD at [place] [address] N/A 7/23/2018    COLONOSCOPY POLYPECTOMY SNARE/COLD BIOPSY performed by Renetta Zambrano MD at [place] [address]  05/10/2022    COLONOSCOPY N/A 5/10/2022    COLON W/ANES.  performed by Romelia Lott MD at [address] [place] [address]  2/4/15    Urethral Dilatation, Resection of Bladder Tumor    CYSTOSCOPY  2/24/2016    fulgeration of bladder tumor    ELBOW SURGERY Left     ENDOSCOPY, SMALL BOWEL N/A 2/20/2019    BOWEL SMALL CAPSULE ENDOSCOPY performed by Renetta Zambrano MD at [address] [place] [address]  05-    cystoscopy, bladder biopsy    OTHER SURGICAL HISTORY  09/09/2015    cystoscopy, urethral dilatation, bladder tumor follow up    UPPER GASTROINTESTINAL ENDOSCOPY N/A 7/23/2018    EGD BIOPSY performed by Renetta Zambrano MD at [facility] N/A 5/9/2022    EGD BIOPSY performed by Romelia Lott MD at [address] [place] [address] Real     Family History   Problem Relation Age of Onset    Cancer Mother         Lung    Emphysema Father     Diabetes Sister     Hypertension Sister     Cancer Brother         throat    Asthma Neg Hx      Social History     Socioeconomic History    Marital status:      Spouse name: Not on file    Number of children: Not on file    Years of education: Not on file    Highest education level: Not on file   Occupational History    Occupation: [employer]     Comment: not working currently   Tobacco Use    Smoking status: Some Days     Packs/day: 1.00     Years: 42.00     Pack years: 42.00     Types: Cigarettes, Cigars     Start date: 9/4/2018    Smokeless tobacco: Former     Types: Snuff   Vaping Use    Vaping Use: Former    Substances: Always   Substance and Sexual Activity    Alcohol use: Yes     Alcohol/week: 6.0 standard drinks     Types: 6 Cans of beer per week     Comment: patient states a 6 pack lasts him a week    Drug use: No    Sexual activity: Yes     Partners: Female   Other Topics Concern    Not on file   Social History Narrative    Not on file     Social Determinants of Health     Financial Resource Strain: Low Risk     Difficulty of Paying Living Expenses: Not very hard   Food Insecurity: No Food Insecurity    Worried About Running Out of Food in the Last Year: Never true    Ran Out of Food in the Last Year: Never true   Transportation Needs: Unknown    Lack of Transportation (Medical): Not on file    Lack of Transportation (Non-Medical): No   Physical Activity: Inactive    Days of Exercise per Week: 0 days    Minutes of Exercise per Session: 0 min   Stress: No Stress Concern Present    Feeling of Stress : Only a little   Social Connections: Socially Isolated    Frequency of Communication with Friends and Family: Twice a week    Frequency of Social Gatherings with Friends and Family: Twice a week    Attends Yazidism Services: Never    Active Member of Clubs or Organizations: No    Attends Club or Organization Meetings: Not on file    Marital Status:    Intimate Partner Violence: Not on file   Housing Stability: [address] to Pay for Housing in the Last Year: No    Number of [place] in the Last Year: 1    Unstable Housing in the Last Year: No     No current facility-administered medications for this encounter.      Current Outpatient Medications   Medication Sig Dispense Refill    furosemide (LASIX) 20 MG tablet TAKE 1 TABLET BY MOUTH DAILY 30 tablet 0    dilTIAZem (CARDIZEM CD) 180 MG extended release capsule TAKE 2 CAPSULES ONCE DAILY 60 capsule 0    FEROSUL 325 (65 Fe) MG tablet TAKE 1 TABLET BY MOUTH DAILY WITH BREAKFAST 90 tablet 3    albuterol sulfate HFA (PROVENTIL;VENTOLIN;PROAIR) 108 (90 Base) MCG/ACT inhaler INHALE 2 PUFFS EVERY 6 HOURS AS NEEDED FOR WHEEZING 18 g 3    allopurinol (ZYLOPRIM) 300 MG tablet TAKE 1 TABLET BY MOUTH DAILY 90 tablet 3    atorvastatin (LIPITOR) 40 MG tablet TAKE 1 TABLET BY MOUTH DAILY 90 tablet 3    insulin glargine (LANTUS SOLOSTAR) 100 UNIT/ML injection pen INJECT 12 UNITS INTO THE SKIN DAILY WITH SUPPER 15 mL 0    ondansetron (ZOFRAN) 4 MG tablet Take 1 tablet by mouth every 6 hours as needed for Nausea or Vomiting 30 tablet 0    thiamine 100 MG tablet TAKE 1 TABLET BY MOUTH DAILY 100 tablet 0    metoprolol succinate (TOPROL XL) 50 MG extended release tablet TAKE ONE TABLET BY MOUTH EVERY DAY 90 tablet 3    DULoxetine (CYMBALTA) 60 MG extended release capsule TAKE ONE CAPSULE BY MOUTH EVERY DAY 30 capsule 11    nicotine polacrilex (NICORETTE) 2 MG gum Chew one piece of gum every 3 to 4  hours as needed for smoking cessation. No more than 5 pieces of gum in a day. 110 each 0    Alcohol Swabs PADS USE AS DIRECTED TO TEST AND INSULIN 100 each 5    fluticasone-umeclidin-vilant (TRELEGY ELLIPTA) 100-62.5-25 MCG/INH AEPB Inhale 1 puff into the lungs in the morning.  1 each 5    vitamin D (VITAMIN D3 ULTRA STRENGTH) 125 MCG (5000 UT) CAPS capsule Take 1 capsule by mouth daily 90 capsule 3    folic acid (FOLVITE) 1 MG tablet TAKE 1 TABLET BY MOUTH DAILY 30 tablet 5    ipratropium-albuterol (DUONEB) 0.5-2.5 (3) MG/3ML SOLN nebulizer solution Inhale 3 mLs into the lungs every 4 hours 360 mL 0    pantoprazole (PROTONIX) 40 MG tablet TAKE ONE TABLET BY MOUTH EVERY MORNING BEFORE BREAKFAST 30 tablet 5    TRUEplus Lancets 30G MISC TEST 2 TIMES DAILY 100 each 5    TRUE METRIX BLOOD GLUCOSE TEST strip USE 1 STRIP IN VITRO ROUTE 2 TIMES DAILY 50 strip 0    PENTIPS 31G X 8 MM MISC USE ONCE DAILY WITH INJECTION 100 each 10    oxyCODONE (ROXICODONE) 5 MG immediate release tablet Take 10 mg by mouth every 8 hours as needed for Pain. Taking 0.5 tablet (10mg) q8h prn (noted 5/12/2022)      naloxone (NARCAN) 4 MG/0.1ML LIQD nasal spray 1 spray by Nasal route as needed for Opioid Reversal May repeat after 3 minutes if no or minimal response as directed       gabapentin (NEURONTIN) 300 MG capsule Take 900 mg by mouth 3 times daily. Blood Pressure Monitoring (BLOOD PRESSURE CUFF) MISC Please dispense insurance preference 1 each 0    Blood Glucose Monitoring Suppl (FREESTYLE FREEDOM LITE) w/Device KIT 1 kit by Does not apply route daily 1 kit 0    OXYGEN Inhale 3 L/min into the lungs nightly as needed (and daily prn)        Allergies   Allergen Reactions    Lisinopril Swelling    Ace Inhibitors Swelling    Influenza Vaccines      He states he was hospitalized when he received his first flu vaccine    Spiriva Handihaler [Tiotropium Bromide Monohydrate] Swelling     Tolerates both tudorza and incruse    Vilanterol        Nursing Notes Reviewed    Physical Exam:  Triage VS:    ED Triage Vitals   Enc Vitals Group      BP 02/28/23 1457 106/72      Heart Rate 02/28/23 1457 (!) 103      Resp 02/28/23 1457 18      Temp 02/28/23 1449 97.3 °F (36.3 °C)      Temp Source 02/28/23 1449 Oral      SpO2 02/28/23 1457 96 %      Weight 02/28/23 1457 220 lb (99.8 kg)      Height --       Head Circumference --       Peak Flow --       Pain Score --       Pain Loc --       Pain Edu? --       Excl. in [address]? --        My pulse ox interpretation is - normal    General appearance:  No acute distress. Skin:  Warm. Dry. Eye:  Extraocular movements intact. Ears, nose, mouth and throat:  Oral mucosa moist   Neck:  Trachea midline.   Tenderness palpation of the right paraspinous region along cervical spine no central tenderness palpation no tenderness palpation along the soft tissue of the neck  Extremity:  No swelling. Normal ROM     Heart:  Regular rate and rhythm, normal S1 & S2, no extra heart sounds. Perfusion:  intact  Respiratory: Mildly labored, diffuse expiratory wheezing     Abdominal:  Normal bowel sounds. Soft. Mild discomfort over the right flank and the right lower back  Back:  No CVA tenderness to palpation, tenderness palpation of the right paraspinous region along the cervical spine no central tenderness palpation no tenderness palpation along the T-spine patient does have some discomfort over the mid to lower L-spine and right paraspinous region  Neurological:  Alert and oriented times 3. No focal neuro deficits. Psychiatric:  Appropriate              I have reviewed and interpreted all of the currently available lab results from this visit (if applicable):  No results found for this visit on 02/28/23. Radiographs (if obtained):  Radiologist's Report Reviewed:  No results found. EKG (if obtained): (All EKG's are interpreted by myself in the absence of a cardiologist)  Sinus tachycardia with first-degree AV block, ventricular rate 103, TX interval 210, QRS duration 84, QTc 448, no significant ST elevation or depression    MDM:    80-year-old male presenting with multiple complaints most of which is shortness of breath, chest pain, cough and syncopal episode. Patient also complaining of cellulitis to the right upper extremity after the fall. Patient is tachycardic to 103 on presentation. Patient does not normally wear O2 during the day but states he does wear 2 to 3 L at night and has been wearing his oxygen all day. Patient is afebrile. Physical exam can be seen above. CBC reveals white count of 30,000. Hemoglobin is within normal limits. Patient is in a KI with creatinine of 1.8 with baseline around 1. Lipase not elevated.   Troponin is not elevated. VBG reveals pH of 7.43 with PCO2 of 40. BNP is elevated at 3400. COVID and flu test are negative. CK is mildly elevated 486. Lactic acid is pending. Blood cultures are ordered and pending. CT head and C-spine are nonacute. CT lumbar spine is nonacute. CT PE protocol reveals no pulmonary embolism or pulmonary abnormality. CT abdomen pelvis reveals early findings of cirrhosis with no focal abnormality of the right lower quadrant. Patient given broad-spectrum antibiotics. Will admit patient to hospital service for further evaluation and treatment. Clinical Impression:  1. Cellulitis of right upper extremity    2. COPD exacerbation ([facility] [address]    3. Syncope and collapse    4. MICHAEL (acute kidney injury) ([facility] [address]    5. Sepsis due to cellulitis [facility])      Total critical care time provided today was 32 minutes. This excludes seperately billable procedures and family discussion time. Critical care time provided for obtaining history, conducting a physical exam, performing and monitoring interventions, ordering, collecting and interpreting tests, and establishing medical decision-making. There was a potential for life/limb threatening pathology requiring close evaluation and intervention with concern for patient decompensation. Comment: Please note this report has been produced using speech recognition software and may contain errors related to that system including errors in grammar, punctuation, and spelling, as well as words and phrases that may be inappropriate. Efforts were made to edit the dictations.         Mevelyn Canavan, MD  02/28/23 0623       Mevelyn Canavan, MD  02/28/23 0920       Mevelyn Canavan, MD  02/28/23 0858

## 2023-03-01 PROBLEM — L03.113 CELLULITIS OF RIGHT UPPER EXTREMITY: Status: ACTIVE | Noted: 2023-03-01

## 2023-03-01 PROBLEM — A41.9 SEPSIS (HCC): Status: ACTIVE | Noted: 2023-03-01

## 2023-03-01 LAB
A/G RATIO: 0.7 (ref 1.1–2.2)
ALBUMIN SERPL-MCNC: 2.9 G/DL (ref 3.4–5)
ALP BLD-CCNC: 89 U/L (ref 40–129)
ALT SERPL-CCNC: 14 U/L (ref 10–40)
ANION GAP SERPL CALCULATED.3IONS-SCNC: 10 MMOL/L (ref 3–16)
AST SERPL-CCNC: 26 U/L (ref 15–37)
BACTERIA: ABNORMAL /HPF
BASOPHILS ABSOLUTE: 0 K/UL (ref 0–0.2)
BASOPHILS RELATIVE PERCENT: 0.1 %
BILIRUB SERPL-MCNC: 0.4 MG/DL (ref 0–1)
BILIRUBIN URINE: NEGATIVE
BLOOD, URINE: NEGATIVE
BUN BLDV-MCNC: 26 MG/DL (ref 7–20)
CALCIUM SERPL-MCNC: 9.5 MG/DL (ref 8.3–10.6)
CHLORIDE BLD-SCNC: 101 MMOL/L (ref 99–110)
CLARITY: CLEAR
CO2: 25 MMOL/L (ref 21–32)
COLOR: YELLOW
CREAT SERPL-MCNC: 1.1 MG/DL (ref 0.8–1.3)
EKG ATRIAL RATE: 88 BPM
EKG DIAGNOSIS: NORMAL
EKG P AXIS: 55 DEGREES
EKG P-R INTERVAL: 222 MS
EKG Q-T INTERVAL: 362 MS
EKG QRS DURATION: 84 MS
EKG QTC CALCULATION (BAZETT): 438 MS
EKG R AXIS: -52 DEGREES
EKG T AXIS: 68 DEGREES
EKG VENTRICULAR RATE: 88 BPM
EOSINOPHILS ABSOLUTE: 0 K/UL (ref 0–0.6)
EOSINOPHILS RELATIVE PERCENT: 0 %
EPITHELIAL CELLS, UA: ABNORMAL /HPF (ref 0–5)
ESTIMATED AVERAGE GLUCOSE: 111.2 MG/DL
GFR SERPL CREATININE-BSD FRML MDRD: >60 ML/MIN/{1.73_M2}
GLUCOSE BLD-MCNC: 125 MG/DL (ref 70–99)
GLUCOSE BLD-MCNC: 141 MG/DL (ref 70–99)
GLUCOSE BLD-MCNC: 159 MG/DL (ref 70–99)
GLUCOSE BLD-MCNC: 167 MG/DL (ref 70–99)
GLUCOSE BLD-MCNC: 168 MG/DL (ref 70–99)
GLUCOSE BLD-MCNC: 175 MG/DL (ref 70–99)
GLUCOSE BLD-MCNC: 182 MG/DL (ref 70–99)
GLUCOSE URINE: NEGATIVE MG/DL
HBA1C MFR BLD: 5.5 %
HCT VFR BLD CALC: 41.9 % (ref 40.5–52.5)
HEMOGLOBIN: 13.4 G/DL (ref 13.5–17.5)
HYALINE CASTS: ABNORMAL /LPF (ref 0–2)
KETONES, URINE: NEGATIVE MG/DL
LACTIC ACID: 2.1 MMOL/L (ref 0.4–2)
LACTIC ACID: 2.8 MMOL/L (ref 0.4–2)
LACTIC ACID: 3 MMOL/L (ref 0.4–2)
LEUKOCYTE ESTERASE, URINE: NEGATIVE
LV EF: 58 %
LVEF MODALITY: NORMAL
LYMPHOCYTES ABSOLUTE: 1.1 K/UL (ref 1–5.1)
LYMPHOCYTES RELATIVE PERCENT: 4 %
MCH RBC QN AUTO: 30.8 PG (ref 26–34)
MCHC RBC AUTO-ENTMCNC: 32 G/DL (ref 31–36)
MCV RBC AUTO: 96.2 FL (ref 80–100)
MICROSCOPIC EXAMINATION: ABNORMAL
MONOCYTES ABSOLUTE: 0.9 K/UL (ref 0–1.3)
MONOCYTES RELATIVE PERCENT: 3.2 %
MUCUS: ABNORMAL /LPF
NEUTROPHILS ABSOLUTE: 25.9 K/UL (ref 1.7–7.7)
NEUTROPHILS RELATIVE PERCENT: 92.7 %
NITRITE, URINE: NEGATIVE
PDW BLD-RTO: 15.2 % (ref 12.4–15.4)
PERFORMED ON: ABNORMAL
PH UA: 6 (ref 5–8)
PLATELET # BLD: 231 K/UL (ref 135–450)
PMV BLD AUTO: 8.1 FL (ref 5–10.5)
POTASSIUM REFLEX MAGNESIUM: 3.7 MMOL/L (ref 3.5–5.1)
PROCALCITONIN: 22.96 NG/ML (ref 0–0.15)
PROTEIN UA: NEGATIVE MG/DL
RBC # BLD: 4.36 M/UL (ref 4.2–5.9)
RBC UA: ABNORMAL /HPF (ref 0–4)
REPORT: NORMAL
SODIUM BLD-SCNC: 136 MMOL/L (ref 136–145)
SPECIFIC GRAVITY UA: 1.02 (ref 1–1.03)
TOTAL CK: 206 U/L (ref 39–308)
TOTAL CK: 218 U/L (ref 39–308)
TOTAL CK: 252 U/L (ref 39–308)
TOTAL CK: 257 U/L (ref 39–308)
TOTAL PROTEIN: 7.3 G/DL (ref 6.4–8.2)
URINE TYPE: ABNORMAL
UROBILINOGEN, URINE: 0.2 E.U./DL
VANCOMYCIN TROUGH: 14.1 UG/ML (ref 10–20)
WBC # BLD: 28 K/UL (ref 4–11)
WBC UA: ABNORMAL /HPF (ref 0–5)

## 2023-03-01 PROCEDURE — 87070 CULTURE OTHR SPECIMN AEROBIC: CPT

## 2023-03-01 PROCEDURE — 6370000000 HC RX 637 (ALT 250 FOR IP): Performed by: INTERNAL MEDICINE

## 2023-03-01 PROCEDURE — 82550 ASSAY OF CK (CPK): CPT

## 2023-03-01 PROCEDURE — 93306 TTE W/DOPPLER COMPLETE: CPT

## 2023-03-01 PROCEDURE — 80053 COMPREHEN METABOLIC PANEL: CPT

## 2023-03-01 PROCEDURE — 87077 CULTURE AEROBIC IDENTIFY: CPT

## 2023-03-01 PROCEDURE — 2700000000 HC OXYGEN THERAPY PER DAY

## 2023-03-01 PROCEDURE — 93010 ELECTROCARDIOGRAM REPORT: CPT | Performed by: INTERNAL MEDICINE

## 2023-03-01 PROCEDURE — 83605 ASSAY OF LACTIC ACID: CPT

## 2023-03-01 PROCEDURE — 87205 SMEAR GRAM STAIN: CPT

## 2023-03-01 PROCEDURE — 99232 SBSQ HOSP IP/OBS MODERATE 35: CPT | Performed by: INTERNAL MEDICINE

## 2023-03-01 PROCEDURE — 2060000000 HC ICU INTERMEDIATE R&B

## 2023-03-01 PROCEDURE — 99223 1ST HOSP IP/OBS HIGH 75: CPT | Performed by: INTERNAL MEDICINE

## 2023-03-01 PROCEDURE — 81001 URINALYSIS AUTO W/SCOPE: CPT

## 2023-03-01 PROCEDURE — 80202 ASSAY OF VANCOMYCIN: CPT

## 2023-03-01 PROCEDURE — 2580000003 HC RX 258: Performed by: INTERNAL MEDICINE

## 2023-03-01 PROCEDURE — 85025 COMPLETE CBC W/AUTO DIFF WBC: CPT

## 2023-03-01 PROCEDURE — 94761 N-INVAS EAR/PLS OXIMETRY MLT: CPT

## 2023-03-01 PROCEDURE — 94640 AIRWAY INHALATION TREATMENT: CPT

## 2023-03-01 PROCEDURE — 6360000002 HC RX W HCPCS: Performed by: INTERNAL MEDICINE

## 2023-03-01 PROCEDURE — 36415 COLL VENOUS BLD VENIPUNCTURE: CPT

## 2023-03-01 PROCEDURE — 99222 1ST HOSP IP/OBS MODERATE 55: CPT | Performed by: INTERNAL MEDICINE

## 2023-03-01 RX ORDER — DULOXETIN HYDROCHLORIDE 60 MG/1
60 CAPSULE, DELAYED RELEASE ORAL DAILY
Status: DISCONTINUED | OUTPATIENT
Start: 2023-03-01 | End: 2023-03-03 | Stop reason: HOSPADM

## 2023-03-01 RX ORDER — OXYCODONE HYDROCHLORIDE 5 MG/1
10 TABLET ORAL EVERY 8 HOURS PRN
COMMUNITY

## 2023-03-01 RX ORDER — LANOLIN ALCOHOL/MO/W.PET/CERES
3 CREAM (GRAM) TOPICAL NIGHTLY PRN
Status: DISCONTINUED | OUTPATIENT
Start: 2023-03-01 | End: 2023-03-03 | Stop reason: HOSPADM

## 2023-03-01 RX ORDER — INSULIN LISPRO 100 [IU]/ML
0-8 INJECTION, SOLUTION INTRAVENOUS; SUBCUTANEOUS EVERY 4 HOURS
Status: DISCONTINUED | OUTPATIENT
Start: 2023-03-01 | End: 2023-03-03 | Stop reason: HOSPADM

## 2023-03-01 RX ORDER — ASPIRIN 81 MG/1
81 TABLET ORAL DAILY
Status: DISCONTINUED | OUTPATIENT
Start: 2023-03-01 | End: 2023-03-03 | Stop reason: HOSPADM

## 2023-03-01 RX ORDER — METHYLPREDNISOLONE SODIUM SUCCINATE 40 MG/ML
40 INJECTION, POWDER, LYOPHILIZED, FOR SOLUTION INTRAMUSCULAR; INTRAVENOUS EVERY 12 HOURS
Status: COMPLETED | OUTPATIENT
Start: 2023-03-01 | End: 2023-03-02

## 2023-03-01 RX ORDER — MAGNESIUM SULFATE 1 G/100ML
1000 INJECTION INTRAVENOUS PRN
Status: DISCONTINUED | OUTPATIENT
Start: 2023-03-01 | End: 2023-03-03 | Stop reason: HOSPADM

## 2023-03-01 RX ORDER — INSULIN GLARGINE 100 [IU]/ML
6 INJECTION, SOLUTION SUBCUTANEOUS NIGHTLY
Status: DISCONTINUED | OUTPATIENT
Start: 2023-03-01 | End: 2023-03-03 | Stop reason: HOSPADM

## 2023-03-01 RX ORDER — IPRATROPIUM BROMIDE AND ALBUTEROL SULFATE 2.5; .5 MG/3ML; MG/3ML
1 SOLUTION RESPIRATORY (INHALATION)
Status: DISCONTINUED | OUTPATIENT
Start: 2023-03-01 | End: 2023-03-01

## 2023-03-01 RX ORDER — SODIUM CHLORIDE 0.9 % (FLUSH) 0.9 %
5-40 SYRINGE (ML) INJECTION PRN
Status: DISCONTINUED | OUTPATIENT
Start: 2023-03-01 | End: 2023-03-03 | Stop reason: HOSPADM

## 2023-03-01 RX ORDER — ATORVASTATIN CALCIUM 40 MG/1
40 TABLET, FILM COATED ORAL DAILY
Status: DISCONTINUED | OUTPATIENT
Start: 2023-03-01 | End: 2023-03-03 | Stop reason: HOSPADM

## 2023-03-01 RX ORDER — DILTIAZEM HYDROCHLORIDE 180 MG/1
180 CAPSULE, COATED, EXTENDED RELEASE ORAL DAILY
Status: DISCONTINUED | OUTPATIENT
Start: 2023-03-01 | End: 2023-03-03 | Stop reason: HOSPADM

## 2023-03-01 RX ORDER — FOLIC ACID 1 MG/1
1000 TABLET ORAL DAILY
Status: DISCONTINUED | OUTPATIENT
Start: 2023-03-01 | End: 2023-03-03 | Stop reason: HOSPADM

## 2023-03-01 RX ORDER — POLYETHYLENE GLYCOL 3350 17 G/17G
17 POWDER, FOR SOLUTION ORAL DAILY PRN
Status: DISCONTINUED | OUTPATIENT
Start: 2023-03-01 | End: 2023-03-03 | Stop reason: HOSPADM

## 2023-03-01 RX ORDER — DEXTROSE MONOHYDRATE 100 MG/ML
INJECTION, SOLUTION INTRAVENOUS CONTINUOUS PRN
Status: DISCONTINUED | OUTPATIENT
Start: 2023-03-01 | End: 2023-03-03 | Stop reason: HOSPADM

## 2023-03-01 RX ORDER — SODIUM CHLORIDE 9 MG/ML
INJECTION, SOLUTION INTRAVENOUS PRN
Status: DISCONTINUED | OUTPATIENT
Start: 2023-03-01 | End: 2023-03-03 | Stop reason: HOSPADM

## 2023-03-01 RX ORDER — M-VIT,TX,IRON,MINS/CALC/FOLIC 27MG-0.4MG
1 TABLET ORAL DAILY
Status: DISCONTINUED | OUTPATIENT
Start: 2023-03-01 | End: 2023-03-03 | Stop reason: HOSPADM

## 2023-03-01 RX ORDER — IPRATROPIUM BROMIDE AND ALBUTEROL SULFATE 2.5; .5 MG/3ML; MG/3ML
1 SOLUTION RESPIRATORY (INHALATION) EVERY 4 HOURS
COMMUNITY

## 2023-03-01 RX ORDER — ONDANSETRON 2 MG/ML
4 INJECTION INTRAMUSCULAR; INTRAVENOUS EVERY 6 HOURS PRN
Status: DISCONTINUED | OUTPATIENT
Start: 2023-03-01 | End: 2023-03-03 | Stop reason: HOSPADM

## 2023-03-01 RX ORDER — POTASSIUM CHLORIDE 7.45 MG/ML
10 INJECTION INTRAVENOUS PRN
Status: DISCONTINUED | OUTPATIENT
Start: 2023-03-01 | End: 2023-03-03 | Stop reason: HOSPADM

## 2023-03-01 RX ORDER — ALBUTEROL SULFATE 2.5 MG/3ML
2.5 SOLUTION RESPIRATORY (INHALATION) EVERY 6 HOURS PRN
Status: DISCONTINUED | OUTPATIENT
Start: 2023-03-01 | End: 2023-03-02

## 2023-03-01 RX ORDER — METOPROLOL SUCCINATE 50 MG/1
50 TABLET, EXTENDED RELEASE ORAL DAILY
Status: DISCONTINUED | OUTPATIENT
Start: 2023-03-01 | End: 2023-03-03 | Stop reason: HOSPADM

## 2023-03-01 RX ORDER — ACETAMINOPHEN 325 MG/1
650 TABLET ORAL EVERY 6 HOURS PRN
Status: DISCONTINUED | OUTPATIENT
Start: 2023-03-01 | End: 2023-03-03 | Stop reason: HOSPADM

## 2023-03-01 RX ORDER — GABAPENTIN 300 MG/1
900 CAPSULE ORAL 3 TIMES DAILY
COMMUNITY

## 2023-03-01 RX ORDER — SODIUM CHLORIDE 9 MG/ML
INJECTION, SOLUTION INTRAVENOUS CONTINUOUS
Status: DISCONTINUED | OUTPATIENT
Start: 2023-03-01 | End: 2023-03-02

## 2023-03-01 RX ORDER — OXYCODONE HYDROCHLORIDE AND ACETAMINOPHEN 5; 325 MG/1; MG/1
1 TABLET ORAL EVERY 6 HOURS PRN
Status: DISCONTINUED | OUTPATIENT
Start: 2023-03-01 | End: 2023-03-03 | Stop reason: HOSPADM

## 2023-03-01 RX ORDER — POTASSIUM CHLORIDE 20 MEQ/1
40 TABLET, EXTENDED RELEASE ORAL PRN
Status: DISCONTINUED | OUTPATIENT
Start: 2023-03-01 | End: 2023-03-03 | Stop reason: HOSPADM

## 2023-03-01 RX ORDER — LANOLIN ALCOHOL/MO/W.PET/CERES
100 CREAM (GRAM) TOPICAL DAILY
Status: DISCONTINUED | OUTPATIENT
Start: 2023-03-01 | End: 2023-03-03 | Stop reason: HOSPADM

## 2023-03-01 RX ORDER — ENOXAPARIN SODIUM 100 MG/ML
40 INJECTION SUBCUTANEOUS DAILY
Status: DISCONTINUED | OUTPATIENT
Start: 2023-03-01 | End: 2023-03-03 | Stop reason: HOSPADM

## 2023-03-01 RX ORDER — ONDANSETRON 4 MG/1
4 TABLET, ORALLY DISINTEGRATING ORAL EVERY 8 HOURS PRN
Status: DISCONTINUED | OUTPATIENT
Start: 2023-03-01 | End: 2023-03-03 | Stop reason: HOSPADM

## 2023-03-01 RX ORDER — OXYCODONE HYDROCHLORIDE AND ACETAMINOPHEN 5; 325 MG/1; MG/1
2 TABLET ORAL EVERY 6 HOURS PRN
Status: DISCONTINUED | OUTPATIENT
Start: 2023-03-01 | End: 2023-03-03 | Stop reason: HOSPADM

## 2023-03-01 RX ORDER — PANTOPRAZOLE SODIUM 40 MG/1
40 TABLET, DELAYED RELEASE ORAL DAILY
Status: DISCONTINUED | OUTPATIENT
Start: 2023-03-01 | End: 2023-03-03 | Stop reason: HOSPADM

## 2023-03-01 RX ORDER — PREDNISONE 20 MG/1
40 TABLET ORAL DAILY
Status: DISCONTINUED | OUTPATIENT
Start: 2023-03-03 | End: 2023-03-03 | Stop reason: HOSPADM

## 2023-03-01 RX ORDER — IPRATROPIUM BROMIDE AND ALBUTEROL SULFATE 2.5; .5 MG/3ML; MG/3ML
1 SOLUTION RESPIRATORY (INHALATION) 4 TIMES DAILY
Status: DISCONTINUED | OUTPATIENT
Start: 2023-03-02 | End: 2023-03-03

## 2023-03-01 RX ORDER — VITAMIN B COMPLEX
1000 TABLET ORAL DAILY
Status: DISCONTINUED | OUTPATIENT
Start: 2023-03-01 | End: 2023-03-03 | Stop reason: HOSPADM

## 2023-03-01 RX ORDER — SODIUM CHLORIDE 0.9 % (FLUSH) 0.9 %
5-40 SYRINGE (ML) INJECTION EVERY 12 HOURS SCHEDULED
Status: DISCONTINUED | OUTPATIENT
Start: 2023-03-01 | End: 2023-03-03 | Stop reason: HOSPADM

## 2023-03-01 RX ORDER — FERROUS SULFATE 325(65) MG
325 TABLET ORAL
Status: DISCONTINUED | OUTPATIENT
Start: 2023-03-01 | End: 2023-03-03 | Stop reason: HOSPADM

## 2023-03-01 RX ORDER — ACETAMINOPHEN 650 MG/1
650 SUPPOSITORY RECTAL EVERY 6 HOURS PRN
Status: DISCONTINUED | OUTPATIENT
Start: 2023-03-01 | End: 2023-03-03 | Stop reason: HOSPADM

## 2023-03-01 RX ADMIN — Medication 1000 UNITS: at 10:03

## 2023-03-01 RX ADMIN — METOPROLOL SUCCINATE 50 MG: 50 TABLET, EXTENDED RELEASE ORAL at 10:02

## 2023-03-01 RX ADMIN — CEFEPIME 2000 MG: 2 INJECTION, POWDER, FOR SOLUTION INTRAVENOUS at 05:02

## 2023-03-01 RX ADMIN — SODIUM CHLORIDE: 9 INJECTION, SOLUTION INTRAVENOUS at 02:21

## 2023-03-01 RX ADMIN — SODIUM CHLORIDE 25 ML: 9 INJECTION, SOLUTION INTRAVENOUS at 17:25

## 2023-03-01 RX ADMIN — IPRATROPIUM BROMIDE AND ALBUTEROL SULFATE 1 AMPULE: 2.5; .5 SOLUTION RESPIRATORY (INHALATION) at 15:44

## 2023-03-01 RX ADMIN — ATORVASTATIN CALCIUM 40 MG: 40 TABLET, FILM COATED ORAL at 10:03

## 2023-03-01 RX ADMIN — MULTIPLE VITAMINS W/ MINERALS TAB 1 TABLET: TAB at 10:05

## 2023-03-01 RX ADMIN — DULOXETINE HYDROCHLORIDE 60 MG: 60 CAPSULE, DELAYED RELEASE ORAL at 10:03

## 2023-03-01 RX ADMIN — CEFEPIME 2000 MG: 2 INJECTION, POWDER, FOR SOLUTION INTRAVENOUS at 17:26

## 2023-03-01 RX ADMIN — METHYLPREDNISOLONE SODIUM SUCCINATE 40 MG: 40 INJECTION, POWDER, FOR SOLUTION INTRAMUSCULAR; INTRAVENOUS at 22:08

## 2023-03-01 RX ADMIN — Medication 3 MG: at 23:05

## 2023-03-01 RX ADMIN — VANCOMYCIN HYDROCHLORIDE 500 MG: 500 INJECTION, POWDER, LYOPHILIZED, FOR SOLUTION INTRAVENOUS at 04:58

## 2023-03-01 RX ADMIN — IPRATROPIUM BROMIDE AND ALBUTEROL SULFATE 1 AMPULE: 2.5; .5 SOLUTION RESPIRATORY (INHALATION) at 08:36

## 2023-03-01 RX ADMIN — POLYETHYLENE GLYCOL 3350 17 G: 17 POWDER, FOR SOLUTION ORAL at 10:11

## 2023-03-01 RX ADMIN — ASPIRIN 81 MG: 81 TABLET, COATED ORAL at 10:03

## 2023-03-01 RX ADMIN — FOLIC ACID 1000 MCG: 1 TABLET ORAL at 10:02

## 2023-03-01 RX ADMIN — CALCIUM CARBONATE 500 MG: 500 TABLET, CHEWABLE ORAL at 15:53

## 2023-03-01 RX ADMIN — IPRATROPIUM BROMIDE AND ALBUTEROL SULFATE 1 AMPULE: .5; 2.5 SOLUTION RESPIRATORY (INHALATION) at 03:04

## 2023-03-01 RX ADMIN — FERROUS SULFATE TAB 325 MG (65 MG ELEMENTAL FE) 325 MG: 325 (65 FE) TAB at 10:03

## 2023-03-01 RX ADMIN — MAGNESIUM SULFATE HEPTAHYDRATE 2000 MG: 40 INJECTION, SOLUTION INTRAVENOUS at 00:57

## 2023-03-01 RX ADMIN — METHYLPREDNISOLONE SODIUM SUCCINATE 40 MG: 40 INJECTION, POWDER, FOR SOLUTION INTRAMUSCULAR; INTRAVENOUS at 10:04

## 2023-03-01 RX ADMIN — Medication 100 MG: at 10:02

## 2023-03-01 RX ADMIN — ENOXAPARIN SODIUM 40 MG: 100 INJECTION SUBCUTANEOUS at 10:03

## 2023-03-01 RX ADMIN — IPRATROPIUM BROMIDE AND ALBUTEROL SULFATE 1 AMPULE: 2.5; .5 SOLUTION RESPIRATORY (INHALATION) at 19:42

## 2023-03-01 RX ADMIN — PANTOPRAZOLE SODIUM 40 MG: 40 TABLET, DELAYED RELEASE ORAL at 10:02

## 2023-03-01 RX ADMIN — DILTIAZEM HYDROCHLORIDE 180 MG: 180 CAPSULE, COATED, EXTENDED RELEASE ORAL at 10:03

## 2023-03-01 RX ADMIN — VANCOMYCIN HYDROCHLORIDE 500 MG: 500 INJECTION, POWDER, LYOPHILIZED, FOR SOLUTION INTRAVENOUS at 17:23

## 2023-03-01 RX ADMIN — INSULIN GLARGINE 6 UNITS: 100 INJECTION, SOLUTION SUBCUTANEOUS at 22:08

## 2023-03-01 RX ADMIN — SODIUM CHLORIDE: 9 INJECTION, SOLUTION INTRAVENOUS at 17:22

## 2023-03-01 ASSESSMENT — PAIN DESCRIPTION - ORIENTATION
ORIENTATION: RIGHT
ORIENTATION: RIGHT

## 2023-03-01 ASSESSMENT — PAIN DESCRIPTION - LOCATION
LOCATION: ARM
LOCATION: ARM

## 2023-03-01 ASSESSMENT — PAIN DESCRIPTION - FREQUENCY: FREQUENCY: CONTINUOUS

## 2023-03-01 ASSESSMENT — PAIN DESCRIPTION - PAIN TYPE: TYPE: ACUTE PAIN

## 2023-03-01 ASSESSMENT — PAIN SCALES - GENERAL
PAINLEVEL_OUTOF10: 8
PAINLEVEL_OUTOF10: 6

## 2023-03-01 ASSESSMENT — PAIN DESCRIPTION - ONSET: ONSET: ON-GOING

## 2023-03-01 ASSESSMENT — PAIN DESCRIPTION - DESCRIPTORS: DESCRIPTORS: ACHING;BURNING;THROBBING

## 2023-03-01 ASSESSMENT — PAIN - FUNCTIONAL ASSESSMENT: PAIN_FUNCTIONAL_ASSESSMENT: ACTIVITIES ARE NOT PREVENTED

## 2023-03-01 NOTE — CONSULTS
[facility] Nurse  Consult Note       NAME:  Kaitlin Sosa RECORD NUMBER:  [id number]  AGE: 78 y.o. GENDER: male  : 1955  TODAY'S DATE:  3/1/2023    Subjective  Pt is alert and oriented. Reason for WOCN Evaluation and Assessment: KRYSTAL Echeverria is a 78 y.o. male referred by:   [x] Physician  [x] Nursing  [] Other:     Wound Identification:  Wound Type: traumatic, strep infection, cellulitis  Contributing Factors: shear force    Pt seen for wound care to the KRYSTAL. Pt states he fell at home and hit his right arm on a fan approximately 5 days ago after a coughing fit made him pass out. Pt has open wound to right forearm.      Patient Goal of Care:  [x] Wound Healing  [] Odor Control  [] Palliative Care  [] Pain Control   [] Other:         PAST MEDICAL HISTORY        Diagnosis Date    Bronchitis chronic     Chest pain     Chronic cough     Cirrhosis of liver (HCC) 2017    stage 4     COPD (chronic obstructive pulmonary disease) (HCC)     COPD (chronic obstructive pulmonary disease) (HCC)     Diabetes mellitus (HCC)     Gout     Hilar adenopathy     Hyperlipidemia     Hypertension     Knee pain, right     Numbness and tingling of leg     Osteoarthritis     Paresthesia of bilateral legs     PVD (peripheral vascular disease) ([facility]     Seizures ([facility]     ongoing, began after swine flu vaccination    Substance abuse ([facility]        PAST SURGICAL HISTORY    Past Surgical History:   Procedure Laterality Date    AORTO-ILIAC BYPASS GRAFT N/A 2020    AORTOBIFEMORAL BYPASS GRAFT performed by Danny Ortega MD at [address] [place] [address]  2011    bronch with EBUS    CATARACT REMOVAL Left     COLONOSCOPY N/A 2018    COLONOSCOPY WITH BIOPSY performed by Anastacia Chaney MD at [facility] N/A 2018    COLONOSCOPY POLYPECTOMY SNARE/COLD BIOPSY performed by Anastacia Chaney MD at [facility] 05/10/2022    COLONOSCOPY N/A 05/10/2022    COLON W/ANES. performed by Alia So MD at [facility]  2015    Urethral Dilatation, Resection of Bladder Tumor    CYSTOSCOPY  2016    fulgeration of bladder tumor    ELBOW SURGERY Left     ENDOSCOPY, SMALL BOWEL N/A 2019    BOWEL SMALL CAPSULE ENDOSCOPY performed by Luis Antonio Delgadillo MD at [address] [place] [address]  2014    cystoscopy, bladder biopsy    OTHER SURGICAL HISTORY  2015    cystoscopy, urethral dilatation, bladder tumor follow up    UPPER GASTROINTESTINAL ENDOSCOPY N/A 2018    EGD BIOPSY performed by Luis Antonio Delgadillo MD at [facility] N/A 2022    EGD BIOPSY performed by Alia So MD at [facility]    Family History   Problem Relation Age of Onset    Cancer Mother         Lung    Emphysema Father     Diabetes Sister     Hypertension Sister     Cancer Brother         throat    Asthma Neg Hx        SOCIAL HISTORY    Social History     Tobacco Use    Smoking status: Former     Packs/day: 1.00     Years: 42.00     Pack years: 42.00     Types: Cigarettes, Cigars     Start date: 2018     Quit date: 2023     Years since quittin.0    Smokeless tobacco: Former     Types: Snuff   Vaping Use    Vaping Use: Former    Substances: Always   Substance Use Topics    Alcohol use:  Yes     Alcohol/week: 6.0 standard drinks     Types: 6 Cans of beer per week     Comment: patient states a 6 pack lasts him a week    Drug use: No       ALLERGIES    Allergies   Allergen Reactions    Lisinopril Swelling    Ace Inhibitors Swelling    Influenza Vaccines      He states he was hospitalized when he received his first flu vaccine    Spiriva Handihaler [Tiotropium Bromide Monohydrate] Swelling     Tolerates both tudorza and incruse    Vilanterol        MEDICATIONS    No current facility-administered medications on file prior to encounter. Current Outpatient Medications on File Prior to Encounter   Medication Sig Dispense Refill    gabapentin (NEURONTIN) 300 MG capsule Take 900 mg by mouth 3 times daily. oxyCODONE (ROXICODONE) 5 MG immediate release tablet Take 10 mg by mouth every 8 hours as needed for Pain.      ipratropium-albuterol (DUONEB) 0.5-2.5 (3) MG/3ML SOLN nebulizer solution Inhale 1 vial into the lungs every 4 hours      furosemide (LASIX) 20 MG tablet TAKE 1 TABLET BY MOUTH DAILY 30 tablet 0    dilTIAZem (CARDIZEM CD) 180 MG extended release capsule TAKE 2 CAPSULES ONCE DAILY 60 capsule 0    FEROSUL 325 (65 Fe) MG tablet TAKE 1 TABLET BY MOUTH DAILY WITH BREAKFAST 90 tablet 3    albuterol sulfate HFA (PROVENTIL;VENTOLIN;PROAIR) 108 (90 Base) MCG/ACT inhaler INHALE 2 PUFFS EVERY 6 HOURS AS NEEDED FOR WHEEZING 18 g 3    allopurinol (ZYLOPRIM) 300 MG tablet TAKE 1 TABLET BY MOUTH DAILY 90 tablet 3    atorvastatin (LIPITOR) 40 MG tablet TAKE 1 TABLET BY MOUTH DAILY 90 tablet 3    insulin glargine (LANTUS SOLOSTAR) 100 UNIT/ML injection pen INJECT 12 UNITS INTO THE SKIN DAILY WITH SUPPER (Patient taking differently: Inject 25 Units into the skin every evening INJECT 25 UNITS INTO THE SKIN DAILY WITH SUPPER) 15 mL 0    ondansetron (ZOFRAN) 4 MG tablet Take 1 tablet by mouth every 6 hours as needed for Nausea or Vomiting 30 tablet 0    thiamine 100 MG tablet TAKE 1 TABLET BY MOUTH DAILY 100 tablet 0    metoprolol succinate (TOPROL XL) 50 MG extended release tablet TAKE ONE TABLET BY MOUTH EVERY DAY 90 tablet 3    DULoxetine (CYMBALTA) 60 MG extended release capsule TAKE ONE CAPSULE BY MOUTH EVERY DAY 30 capsule 11    nicotine polacrilex (NICORETTE) 2 MG gum Chew one piece of gum every 3 to 4  hours as needed for smoking cessation. No more than 5 pieces of gum in a day.  110 each 0    Alcohol Swabs PADS USE AS DIRECTED TO TEST AND INSULIN 100 each 5    fluticasone-umeclidin-vilant ([place]) 100-62.5-25 MCG/INH AEPB Inhale 1 puff into the lungs in the morning.  1 each 5    vitamin D (VITAMIN D3 ULTRA STRENGTH) 125 MCG (5000 UT) CAPS capsule Take 1 capsule by mouth daily 90 capsule 3    folic acid (FOLVITE) 1 MG tablet TAKE 1 TABLET BY MOUTH DAILY 30 tablet 5    pantoprazole (PROTONIX) 40 MG tablet TAKE ONE TABLET BY MOUTH EVERY MORNING BEFORE BREAKFAST 30 tablet 5    TRUEplus Lancets 30G MISC TEST 2 TIMES DAILY 100 each 5    TRUE METRIX BLOOD GLUCOSE TEST strip USE 1 STRIP IN VITRO ROUTE 2 TIMES DAILY 50 strip 0    PENTIPS 31G X 8 MM MISC USE ONCE DAILY WITH INJECTION 100 each 10    Blood Pressure Monitoring (BLOOD PRESSURE CUFF) MISC Please dispense insurance preference 1 each 0    Blood Glucose Monitoring Suppl (FREESTYLE FREEDOM LITE) w/Device KIT 1 kit by Does not apply route daily 1 kit 0    OXYGEN Inhale 3 L/min into the lungs nightly as needed (and daily prn)          Objective    BP (!) 110/56   Pulse 87   Temp 97.7 °F (36.5 °C) (Oral)   Resp 16   Ht 5' 7.75\" (1.721 m)   Wt 205 lb 8 oz (93.2 kg)   SpO2 90%   BMI 31.48 kg/m²     LABS:  WBC:    Lab Results   Component Value Date/Time    WBC 28.0 03/01/2023 08:03 AM     H/H:    Lab Results   Component Value Date/Time    HGB 13.4 03/01/2023 08:03 AM    HCT 41.9 03/01/2023 08:03 AM     PTT:    Lab Results   Component Value Date/Time    APTT 31.7 06/29/2019 03:50 PM   [APTT}  PT/INR:    Lab Results   Component Value Date/Time    PROTIME 15.5 02/28/2023 03:09 PM    INR 1.25 02/28/2023 03:09 PM     HgBA1c:    Lab Results   Component Value Date/Time    LABA1C 5.5 02/28/2023 03:09 PM       Assessment   Abraham Risk Score: Abraham Scale Score: 18    Patient Active Problem List   Diagnosis Code    Hilar adenopathy R59.0    GERD (gastroesophageal reflux disease) K21.9    Chest pain R07.9    Gout M10.9    Paresthesia of bilateral legs R20.2    Right knee pain M25.561    Numbness and tingling of right leg R20.0, R20.2    Osteoarthritis of multiple joints M15.9    Supraclavicular fossa fullness R22.2    Carpal tunnel syndrome of left wrist G56.02    Alcohol abuse F10.10    Hyponatremia E87.1    Hepatomegaly R16.0    Chronic alcoholic hepatitis B94.72    Ulnar neuropathy at elbow G56.20    Chronic pain G89.29    Malignant neoplasm of urinary bladder (HCC) C67.9    Mixed simple and mucopurulent chronic bronchitis (HCC) J41.8    Ulnar nerve entrapment G56.20    Mixed hyperlipidemia E78.2    Bladder outlet obstruction N32.0    Diabetic ketoacidosis without coma associated with type 2 diabetes mellitus (HCC) E11.10    Type 2 diabetes mellitus with hyperglycemia, with long-term current use of insulin (HCC) E11.65, Z79.4    Bronchiectasis without complication (HCC) O81.5    Onychomycosis B35.1    Tinea pedis of both feet B35.3    Chronic idiopathic constipation O59.48    Alcoholic cirrhosis of liver without ascites (HCC) K70.30    Family history of rectal cancer Z80.0    Rectal bleeding K62.5    Polyp of colon K63.5    Diverticulosis of intestine without bleeding K57.90    Secondary esophageal varices without bleeding (HCC) I85.10    Erosive gastritis K29.60    Multiple duodenal ulcers K26.9    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, K74.60    Iron deficiency anemia D50.9    AVM (arteriovenous malformation) of small bowel, acquired K55.20    Uncontrolled hypertension I10    Severe claudication (HCC) I73.9    Hypercalcemia E83.52    MICHAEL (acute kidney injury) ([facility] N17.9    Other constipation K59.09    Hypokalemia E87.6    Primary osteoarthritis of right knee M17.11    Venous insufficiency I87.2    Peripheral vascular disease (HCC) I73.9    Diabetic polyneuropathy associated with type 2 diabetes mellitus (HCC) E11.42    Situational anxiety F41.8    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) I74.09    GI bleed K92.2    Anemia D64.9    Gastritis K29.70    Chronic respiratory failure with hypoxia (HCC) J96.11    Primary hypertension I10    History of tobacco abuse Z87.891    Cough syncope R55, R05.4    Diastasis recti M62.08    Sepsis ([facility] A41.9    Cellulitis of right upper extremity L03. 113       Right arm:  2.5x1.5x0.1cm, 100% dry red base. Surrounding skin with large purulent filled, ruptured bulla and red scattered strep infected tissue. Scant serous drainage from wound itself, ruptured area of bulla adjacent to wound. Response to treatment:  Well tolerated by patient. Pain Assessment:  Severity:  0 / 10  Quality of pain: N/A  Wound Pain Timing/Severity: none  Premedicated: N/A    Plan  Right forearm:  Cleanse wound with NS, pat dry. Apply Alginate ag  and cover with dry gauze, secure with roll gauze. Change daily and prn        Specialty Bed Required : N/A   [] Low Air Loss   [] Pressure Redistribution  [] Fluid Immersion  [] Bariatric  [] Total Pressure Relief  [] Other:     Current Diet: ADULT DIET;  Regular  Dietician consult:  N/A    Discharge Plan:  Placement for patient upon discharge: home with support    Patient appropriate for Outpatient [address]: Yes    Referrals:  [x]   [] [employer] [facility]  [] Supplies  [] Other    Patient/Caregiver Teaching:  Level of patient/caregiver understanding able to:   [] Indicates understanding       [] Needs reinforcement  [] Unsuccessful      [x] Verbal Understanding  [] Demonstrated understanding       [] No evidence of learning  [] Refused teaching         [] N/A       Electronically signed by Khadijah Wynne RN, CWOCN on 3/1/2023 at 5:39 PM

## 2023-03-01 NOTE — CARE COORDINATION
Case Management Assessment  Initial Evaluation    Date/Time of Evaluation: 3/1/2023 9:36 AM  Assessment Completed by: MARK Pascal  Case Management Department  Phone: [phone] Fax: [phone]      If patient is discharged prior to next notation, then this note serves as note for discharge by case management. Patient Name: Stephie Mc                   YOB: 1955  Diagnosis: Syncope and collapse [R55]  COPD exacerbation ([address] [J44.1]  MICHAEL (acute kidney injury) ([facility] [N17.9]  Cellulitis of right upper extremity [L03.113]  Sepsis ([facility] [A41.9]  Sepsis due to cellulitis ([facility] [L03.90, A41.9]                   Date / Time: 2/28/2023  2:44 PM    Patient Admission Status: Inpatient   Readmission Risk (Low < 19, Mod (19-27), High > 27): Readmission Risk Score: 15    Current PCP: Chrissie Copeland MD  PCP verified by CM? Yes    Chart Reviewed: Yes      History Provided by: Patient  Patient Orientation: Alert and Oriented    Patient Cognition: Alert    Hospitalization in the last 30 days (Readmission):  No    If yes, Readmission Assessment in CM Navigator will be completed. Advance Directives:      Code Status: Full Code   Patient's Primary Decision Maker is: Legal Next of Kin (Pt declined POA papers.  he is aware without POA in place, his daughter would be legal next of kin)    Primary Decision Maker: Wynette Cushing - Brother/Sister - [phone]    Discharge Planning:    Patient lives with: Alone Type of Home: House  Primary Care Giver: Self  Patient Support Systems include: Family Members   Current Financial resources: Medicaid, Medicare ([employer])  Current community resources: None  Current services prior to admission: Durable Medical Equipment, Other (Comment) (Pain Management: He stated he sees them every 30 days)            Current DME: Oxygen Therapy (Comment), Other (Comment) (Leading Respiratory 3 liters cont and stated he has a HHN)            Type of Home Care services:  None    ADLS  Prior functional level: Independent in ADLs/IADLs  Current functional level: Independent in ADLs/IADLs    PT AM-PAC:   /24  OT AM-PAC:   /24    Family can provide assistance at DC: Yes  Would you like Case Management to discuss the discharge plan with any other family members/significant others, and if so, who? Yes  Plans to Return to Present Housing: Yes  Other Identified Issues/Barriers to RETURNING to current housing: n/a  Potential Assistance needed at discharge: N/A            Potential DME:    Patient expects to discharge to: [address] [place] [address] for transportation at discharge:      Financial    Payor: Bekah Perez / Plan: [address] / Product Type: *No Product type* /     Does insurance require precert for SNF: Yes    Potential assistance Purchasing Medications: No  Meds-to-Beds request:        [address] [facility] [phone] - f [phone]  [address] [facility] [address]  Phone: [phone] Fax: [address] [address]  [address] [address]  Phone: [phone] Fax: [phone]    [address] [facility], [address] [place] [address], [address] [place] [phone] - f [phone]  [address] , [address]  [facility] [address]  Phone: [phone] Fax: [phone]      Notes:    Factors facilitating achievement of predicted outcomes: Family support and Has needed Durable Medical Equipment at home    Barriers to discharge: n/a    Additional Case Management Notes: Chart review completed. Met with pt at bedside. Pt stated that he is independent at home with his ADL's and plans on returning home when discharged. He stated that on the 22nd of this month, he has someone coming out from [facility] [employer] for an assessment. Message left for Karina Jenkins at [address] [facility]  [phone] requesting return call to confirm pt is active with them.  At 9:54am: Received return call from Adrianaseferino Andrew who stated pt has o2 orders at 2 liters at night from last may and will need new testing if he needs home o2 cont. CM will follow. Please notify CM if needs or concerns arise.      The Plan for Transition of Care is related to the following treatment goals of Syncope and collapse [R55]  COPD exacerbation ([address] [J44.1]  MICHAEL (acute kidney injury) ([address] [N17.9]  Cellulitis of right upper extremity [U14.410]  Sepsis ([address] [A41.9]  Sepsis due to cellulitis ([address] [L03.90, A41.9]    MARK Gong  Case Management Department  Phone: [phone] Fax: [phone]

## 2023-03-01 NOTE — PLAN OF CARE
Problem: Discharge Planning  Goal: Discharge to home or other facility with appropriate resources  3/1/2023 1527 by Kevin Monzon RN  Outcome: Progressing  3/1/2023 0300 by Lyubov Gage RN  Outcome: Progressing  Flowsheets  Taken 3/1/2023 0224  Discharge to home or other facility with appropriate resources: Identify barriers to discharge with patient and caregiver  Taken 3/1/2023 0215  Discharge to home or other facility with appropriate resources: Identify barriers to discharge with patient and caregiver     Problem: Pain  Goal: Verbalizes/displays adequate comfort level or baseline comfort level  3/1/2023 1527 by Kevin Monzon RN  Outcome: Progressing  3/1/2023 0300 by Lyubov Gage RN  Outcome: Progressing  Flowsheets (Taken 3/1/2023 0147)  Verbalizes/displays adequate comfort level or baseline comfort level: Encourage patient to monitor pain and request assistance     Problem: Safety - Adult  Goal: Free from fall injury  3/1/2023 1527 by Kevin Monzon RN  Outcome: Progressing  3/1/2023 0300 by Lyubov Gage RN  Outcome: Progressing     Problem: Neurosensory - Adult  Goal: Achieves stable or improved neurological status  3/1/2023 1527 by Kevin Monzon RN  Outcome: Progressing  3/1/2023 0300 by Lyubov Gage RN  Outcome: Progressing  Flowsheets (Taken 3/1/2023 0224)  Achieves stable or improved neurological status: Assess for and report changes in neurological status     Problem: Respiratory - Adult  Goal: Achieves optimal ventilation and oxygenation  3/1/2023 1527 by Kevin Monzon RN  Outcome: Progressing  3/1/2023 0300 by Lyubov Gage RN  Outcome: Progressing  Flowsheets (Taken 3/1/2023 0224)  Achieves optimal ventilation and oxygenation: Assess for changes in respiratory status

## 2023-03-01 NOTE — PROGRESS NOTES
4 Eyes Skin Assessment     The patient is being assess for   Admission    I agree that 2 RN's have performed a thorough Head to Toe Skin Assessment on the patient. ALL assessment sites listed below have been assessed. Areas assessed for pressure by both nurses:   [x]   Head, Face, and Ears   [x]   Shoulders, Back, and Chest, Abdomen  [x]   Arms, Elbows, and Hands   [x]   Coccyx, Sacrum, and Ischium  [x]   Legs, Feet, and Heels        RUE-red, warm, swelling, skin peeling, weeping cellulitis r/t fall at home. Scattered bruises and abrasions. Bilat heels and great toes with calluses. Skin Assessed Under all Medical Devices by both nurses:  O2 device tubing              All Mepilex Borders were peeled back and area peeked at by both nurses:  n/a             **SHARE this note so that the co-signing nurse is able to place an eSignature**    Co-signer eSignature: Electronically signed by Lenin Pena RN on 3/1/23 at 2:50 AM EST    Does the Patient have Skin Breakdown related to pressure?   No                    Abraham Prevention initiated:  NA   Wound Care Orders initiated:  Yes      [address] nurse consulted for Pressure Injury (Stage 3,4, Unstageable, DTI, NWPT, Complex wounds)and New or Established Ostomies:  Yes      Primary Nurse eSignature: Electronically signed by Rose Mayorga RN on 3/1/23 at 2:47 AM EST

## 2023-03-01 NOTE — CONSULTS
[facility]  [phone]        Reason for Consultation/Chief Complaint: \"I have been asked to see him for syncope and collapse . \"    History of Present Illness:  Ciera Cleveland is a 78 y.o. patient who presented to the hospital with complaints of shortness of breath, cough chills yellow flegm for 5 days. He says he was coughing so hard and passed out 5 days ago fell on fan. He found himself on ground. He lives alone there was no witness. He had chills but no fever. He injured his rt arm and has oozing ulcer. He had chest pain on coughing 5 days ago but that is almost resolved. He has no heart disease known to him in past.  No prior syncope and no recurrence since last episode. He is a smoker atleast 50 packs+. Heavy alcohol use in past but now one beer a day on average  He has cirrhosis of liver. HBP HLD DM and peripheral neuropathy. H/o Bladder cancer removed. He has not noted any swelling of legs no orthopnea hemoptysis nausea vomiting and diarrhea. H/o vascular surgery not sure if venous or arterial intraabdominal by Dr Humza Ybarra. I have been asked to provide consultation regarding further management and testing. Past Medical History:   has a past medical history of Bronchitis chronic, Chest pain, Chronic cough, Cirrhosis of liver (HCC), COPD (chronic obstructive pulmonary disease) ([address] COPD (chronic obstructive pulmonary disease) ([address] Diabetes mellitus ([address] Gout, Hilar adenopathy, Hyperlipidemia, Hypertension, Knee pain, right, Numbness and tingling of leg, Osteoarthritis, Paresthesia of bilateral legs, PVD (peripheral vascular disease) ([address] Seizures ([address] and Substance abuse ([address] Surgical History:   has a past surgical history that includes bronchoscopy (02/07/2011); Elbow surgery (Left); Cystoscopy (02/04/2015); other surgical history (05/06/2014); other surgical history (09/09/2015); Cystoscopy (02/24/2016);  Upper gastrointestinal endoscopy (N/A, 07/23/2018); Endoscopy, small bowel with ileum (N/A, 02/20/2019); Aorto-iliac Bypass Graft (N/A, 08/24/2020); Upper gastrointestinal endoscopy (N/A, 05/09/2022); Colonoscopy (N/A, 07/23/2018); Colonoscopy (N/A, 07/23/2018); Colonoscopy (05/10/2022); Colonoscopy (N/A, 05/10/2022); and Cataract removal (Left). Social History:   reports that he quit smoking 5 days ago. His smoking use included cigarettes and cigars. He started smoking about 4 years ago. He has a 42.00 pack-year smoking history. He has quit using smokeless tobacco.  His smokeless tobacco use included snuff. He reports current alcohol use of about 6.0 standard drinks per week. He reports that he does not use drugs. Family History:  family history includes Cancer in his brother and mother; Diabetes in his sister; Emphysema in his father; Hypertension in his sister. Home Medications:  Were reviewed and are listed in nursing record. and/or listed below  Prior to Admission medications    Medication Sig Start Date End Date Taking? Authorizing Provider   gabapentin (NEURONTIN) 300 MG capsule Take 900 mg by mouth 3 times daily. Yes Historical Provider, MD   oxyCODONE (ROXICODONE) 5 MG immediate release tablet Take 10 mg by mouth every 8 hours as needed for Pain.    Yes Historical Provider, MD   ipratropium-albuterol (DUONEB) 0.5-2.5 (3) MG/3ML SOLN nebulizer solution Inhale 1 vial into the lungs every 4 hours   Yes Historical Provider, MD   furosemide (LASIX) 20 MG tablet TAKE 1 TABLET BY MOUTH DAILY 2/13/23   Jesus Kennedy MD   dilTIAZem [facility] CD) 180 MG extended release capsule TAKE 2 CAPSULES ONCE DAILY 2/13/23   Jesus Kennedy MD   FEROSUL 325 (65 Fe) MG tablet TAKE 1 TABLET BY MOUTH DAILY WITH BREAKFAST 2/7/23   Jesus Kennedy MD   albuterol sulfate HFA (PROVENTIL;VENTOLIN;PROAIR) 108 (90 Base) MCG/ACT inhaler INHALE 2 PUFFS EVERY 6 HOURS AS NEEDED FOR WHEEZING 1/24/23   Jesus Kennedy MD allopurinol (ZYLOPRIM) 300 MG tablet TAKE 1 TABLET BY MOUTH DAILY 1/16/23   Britta Tellez MD   atorvastatin (LIPITOR) 40 MG tablet TAKE 1 TABLET BY MOUTH DAILY 1/16/23   Britta Tellez MD   insulin glargine (LANTUS SOLOSTAR) 100 UNIT/ML injection pen INJECT 12 UNITS INTO THE SKIN DAILY WITH SUPPER  Patient taking differently: Inject 25 Units into the skin every evening INJECT 25 UNITS INTO THE SKIN DAILY WITH SUPPER 1/11/23   Britta Tellez MD   ondansetron [facility]) 4 MG tablet Take 1 tablet by mouth every 6 hours as needed for Nausea or Vomiting 1/5/23   NAYELY Kruse CNP   thiamine 100 MG tablet TAKE 1 TABLET BY MOUTH DAILY 12/9/22   NAYELY Kruse CNP   metoprolol succinate (TOPROL XL) 50 MG extended release tablet TAKE ONE TABLET BY MOUTH EVERY DAY 11/15/22   Britta Tellez MD   DULoxetine (CYMBALTA) 60 MG extended release capsule TAKE ONE CAPSULE BY MOUTH EVERY DAY 10/11/22   Britta Tellez MD   nicotine polacrilex (NICORETTE) 2 MG gum Chew one piece of gum every 3 to 4  hours as needed for smoking cessation. No more than 5 pieces of gum in a day. 8/8/22   NAYELY Vergara CNP   Alcohol Swabs PADS USE AS DIRECTED TO TEST AND INSULIN 8/3/22   Britta Tellez MD   fluticasone-umeclidin-vilant (TRELEGY ELLIPTA) 036-36.7-99 MCG/INH AEPB Inhale 1 puff into the lungs in the morning.  8/2/22   Britta Tellez MD   vitamin D (VITAMIN D3 ULTRA STRENGTH) 125 MCG (5000 UT) CAPS capsule Take 1 capsule by mouth daily 7/12/22   Britta Tellez MD   folic acid (FOLVITE) 1 MG tablet TAKE 1 TABLET BY MOUTH DAILY 6/23/22   Britta Tellez MD   pantoprazole (PROTONIX) 40 MG tablet TAKE ONE TABLET BY MOUTH EVERY MORNING BEFORE BREAKFAST 4/13/22   NAYELY Vergara CNP   TRUEplus Lancets 30G [address] [facility] TEST 2 TIMES DAILY 4/6/22   Britta Tellez MD   TRUE METRIX BLOOD GLUCOSE TEST strip USE 1 STRIP IN VITRO ROUTE 2 TIMES DAILY 4/6/22   Sirena Oleary MD   PENTIPS 31G X 8 MM MISC USE ONCE DAILY WITH INJECTION 1/19/22   Sirena Oleary MD   Blood Pressure Monitoring (BLOOD PRESSURE CUFF) MISC Please dispense insurance preference 7/18/19   Sirena Oleary MD   Blood Glucose Monitoring Suppl (FREESTYLE FREEDOM LITE) w/Device KIT 1 kit by Does not apply route daily 12/14/18   NAYELY Vick - CNP   OXYGEN Inhale 3 L/min into the lungs nightly as needed (and daily prn)     Historical Provider, MD        Allergies:  Lisinopril, Ace inhibitors, Influenza vaccines, Spiriva handihaler [tiotropium bromide monohydrate], and Vilanterol     Review of Systems:   12 point ROS negative in all areas as listed below except as in [place]  Constitutional, EENT, GI, , Musculoskeletal, skin,  hematological, endocrine, Psychiatric    Physical Examination:  106/62 pulse 88 resp 18 Spo2 96%  Vitals:    03/01/23 0836   BP:    Pulse:    Resp:    Temp:    SpO2: 92%    Weight: 205 lb 8 oz (93.2 kg)         General Appearance:  Alert, cooperative, mild resp distress, appears stated age   Head:  Normocephalic, without obvious abnormality, atraumatic   Eyes:  PERRL, conjunctiva/corneas clear       Nose: Nares normal, no drainage or sinus tenderness   Throat: Lips, mucosa, and tongue normal   Neck: Supple, symmetrical, trachea midline, no adenopathy, thyroid: not enlarged, symmetric, no tenderness/mass/nodules, no carotid bruit or JVD       Lungs:   Exp rhonchi to auscultation bilaterally, respirations mildly labored   Chest Wall:  No tenderness or deformity   Heart:  Regular rate and rhythm, S1, S2 normal, no murmur, rub or gallop   Abdomen:   Soft, non-tender, bowel sounds active all four quadrants,  no masses, no organomegaly    Midline scar       Extremities: Extremities cellulitis and ulcer rt forearm no cyanosis or edema   Pulses: 1+ bilat femoral and bruits.  No palpable pedal pulses  2+ radials and symmetric   Skin: Skin color, texture, turgor normal, no rashes or lesions   Pysch: Normal mood and affect   Neurologic: Normal gross motor and sensory exam.         Labs  CBC:   Lab Results   Component Value Date/Time    WBC 28.0 03/01/2023 08:03 AM    RBC 4.36 03/01/2023 08:03 AM    HGB 13.4 03/01/2023 08:03 AM    HCT 41.9 03/01/2023 08:03 AM    MCV 96.2 03/01/2023 08:03 AM    RDW 15.2 03/01/2023 08:03 AM     03/01/2023 08:03 AM     CMP:    Lab Results   Component Value Date/Time     03/01/2023 08:03 AM    K 3.7 03/01/2023 08:03 AM     03/01/2023 08:03 AM    CO2 25 03/01/2023 08:03 AM    BUN 26 03/01/2023 08:03 AM    CREATININE 1.1 03/01/2023 08:03 AM    GFRAA >60 07/11/2022 11:46 AM    GFRAA >60 04/02/2013 10:28 AM    AGRATIO 0.7 03/01/2023 08:03 AM    LABGLOM >60 03/01/2023 08:03 AM    GLUCOSE 141 03/01/2023 08:03 AM    PROT 7.3 03/01/2023 08:03 AM    PROT 9.1 01/12/2011 11:37 AM    CALCIUM 9.5 03/01/2023 08:03 AM    BILITOT 0.4 03/01/2023 08:03 AM    ALKPHOS 89 03/01/2023 08:03 AM    AST 26 03/01/2023 08:03 AM    ALT 14 03/01/2023 08:03 AM     PT/INR:  No results found for: PTINR  Lab Results   Component Value Date    CKTOTAL 252 03/01/2023    TROPONINI <0.01 02/28/2023       EKG:  I have reviewed EKG with the following interpretation:  Impression:   Sinus rhythm with 1st degree A-V blockLeft axis deviation Low voltage QRS Left anterior fascicular blockSeptal infarct (cited on or before 28-FEB-2023)Abnormal ECGWhen compared with ECG of 28-FEB-2023 15:00,No significant change was foundConfirmed by Jacoby Mcgraw MD, [address] (1985) on 3/1/2023 5:26:32 AM  Sinus tachycardia with 1st degree A-V blockLeft axis deviationPulmonary disease patternSeptal infarct , age undeterminedAbnormal ECGWhen compared with ECG of 05-MAY-2022 12:51,No significant change was foundConfirmed by Jacoby Mcgraw MD, [address] (1985) on 2/28/2023 5:13:51 PM      CXRAY 2.28.23     FINDINGS:   Normal cardiomediastinal silhouette. No acute airspace infiltrate. No   pneumothorax or pleural effusion           Impression   No acute cardiopulmonary findings         ProBNP 3481 troponin <0.01  Assessment  Cough syncope, precipitated by dehydration meds and hypoxia  Acute exacerbation of COPD  Bacterial bronchitis  Cellulitis rt forearm  MICHAEL  Primary hypertension on multiple meds BP low normal  Hyperlipidemia  Cirrhosis of liver    I had the opportunity to review the clinical symptoms and presentation of Erle Show. Recommend simplify his medications monitor BP including orthostatic BP  He is on pain meds percocet and gabapantine goes to pain management  Asa statin   echo already planned  Hydration   Antibiotics  Bronchodilators   O2 supplementation  If echo is normal we donot need any further testing  Heart monitor at discharge of EF is low      I will address the patient's cardiac risk factors and adjusted pharmacologic treatment as needed. In addition, I have reinforced the need for patient directed risk factor modification. Tobacco use was discussed with the patient and educated on the negative effects. I have asked the patient to not utilize these agents. Thank you for allowing to us to participate in the care or Erle Show. Further evaluation will be based upon the patient's clinical course and testing results. All questions and concerns were addressed to the patient/family. Alternatives to my treatment were discussed. The note was completed using EMR. Every effort was made to ensure accuracy; however, inadvertent computerized transcription errors may be present.

## 2023-03-01 NOTE — ED NOTES
Report given to [facility] from [address] [facility]     Quang Dee RN  03/01/23 [address]  Patient reports she tested negative for COVID, but cannot go back until she supplied [facility]. She is waiting for report in the mail. We have documentation on her chart from [facility] that she had a negative Covid. Please advise on back to work recommendation.

## 2023-03-01 NOTE — PROGRESS NOTES
Pt admitted to ,  # [id number]. Pt a/o x4 VSS Resp e/e. Admission assessment completed and charted. Pt oriented to /facility. Call light in reach. Will monitor. Hola Marrero RN     Bedside Mobility Assessment Tool (BMAT):     Assessment Level 1- Sit and Shake    1. From a semi-reclined position, ask patient to sit up and rotate to a seated position at the side of the bed. Can use the bedrail. 2. Ask patient to reach out and grab your hand and shake making sure patient reaches across his/her midline. Pass- Patient is able to come to a seated position, maintain core strength. Maintains seated balance while reaching across midline. Move on to Assessment Level 2. Assessment Level 2- Stretch and Point   1. With patient in seated position at the side of the bed, have patient place both feet on the floor (or stool) with knees no higher than hips. 2. Ask patient to stretch one leg and straighten the knee, then bend the ankle/flex and point the toes. If appropriate, repeat with the other leg. Pass- Patient is able to demonstrate appropriate quad strength on intended weight bearing limb(s). Move onto Assessment Level 3. Assessment Level 3- Stand   1. Ask patient to elevate off the bed or chair (seated to standing) using an assistive device (cane, bedrail). 2. Patient should be able to raise buttocks off be and hold for a count of five. May repeat once. Pass- Patient maintains standing stability for at least 5 seconds, proceed to assessment level 4. Assessment Level 4- Walk   1. Ask patient to march in place at bedside. 2. Then ask patient to advance step and return each foot. Some medical conditions may render a patient from stepping backwards, use your best clinical judgement. Pass- Patient demonstrates balance while shifting weight and ability to step, takes independent steps, does not use assistive device patient is MOBILITY LEVEL 4.       Mobility Level- 4    Patient is able to demonstrate the ability to move from a reclining position to an upright position within the recliner.

## 2023-03-01 NOTE — H&P
Hospital Medicine History & Physical      PCP: Sherry Skinner MD    Date of Service: Pt seen/examined on 2/28/23 and admitted on 2/28/23 to Inpatient    Chief Complaint   Patient presents with    Shortness of Breath     X2 days. Hx COPD; cp with inspiration; abrasion to Right elbow from fall a couple days ago. 125 solu-medrol; 1 Duoneb and 324 ASA given in route        History Of Present Illness: The patient is a 78 y.o. male with PMH below, presents with SOB/FALCON, increased O2 demand, R forearm redness, pain and swelling, recent fall/syncope, productive cough, intermittent CP. Pt reports that he had a coughing fit 4-5 d ago that made him pass out and fall. He says he hit his R forearm on a fan. He says he had a similar episode of syncope several months ago. He had a skin tear to his R forearm. He says the next day he began having redness around to wound which increased to involve nearly all of his R forearm since. He also says within a day or 2 he developed a large white area centrally which has not changed since. His forearm has become increasingly painful. Pain is sharp, constant, severe. Worse w/ palpation and use of his arm. He also notes that for the last week or so he has had increasingly SOB/FALCON w/ increasingly productive cough. He says he normally wears 2-3 L O2 ON and PRN w/ exertions. He says he rarely requires it during the day. However, the last few days he has been requiring during the day and at rest.  He also notes that he has had some intermittent L CP over the last week. He has had exac w/ cough and when he is feeling particularly SOB. None currently. He still has some pain in his R neck since the fall.       Past Medical History:        Diagnosis Date    Bronchitis chronic     Chest pain     Chronic cough     Cirrhosis of liver ([facility] 01/01/2017    stage 4     COPD (chronic obstructive pulmonary disease) (HCC)     COPD (chronic obstructive pulmonary disease) ([facility] [address] Diabetes mellitus ([facility] [address]     Gout     Hilar adenopathy     Hyperlipidemia     Hypertension     Knee pain, right     Numbness and tingling of leg     Osteoarthritis     Paresthesia of bilateral legs     PVD (peripheral vascular disease) ([facility] [address]     Seizures ([facility] [address]     ongoing, began after swine flu vaccination    Substance abuse [facility])        Past Surgical History:        Procedure Laterality Date    AORTO-FEMORAL BYPASS GRAFT  08/2020    AORTO-ILIAC BYPASS GRAFT N/A 8/24/2020    AORTOBIFEMORAL BYPASS GRAFT performed by Mabel Dunaway MD at [address] [place] [address]  2/7/2011    bronch with EBUS    COLONOSCOPY N/A 7/23/2018    COLONOSCOPY WITH BIOPSY performed by Vanessa Rose MD at [place] [address] N/A 7/23/2018    COLONOSCOPY POLYPECTOMY SNARE/COLD BIOPSY performed by Vanessa Rose MD at [place] [address]  05/10/2022    COLONOSCOPY N/A 5/10/2022    COLON W/ANES. performed by Mike Song MD at [address] [place] [address]  2/4/15    Urethral Dilatation, Resection of Bladder Tumor    CYSTOSCOPY  2/24/2016    fulgeration of bladder tumor    ELBOW SURGERY Left     ENDOSCOPY, SMALL BOWEL N/A 2/20/2019    BOWEL SMALL CAPSULE ENDOSCOPY performed by Vanessa Rose MD at [address] [place] [address]  05-    cystoscopy, bladder biopsy    OTHER SURGICAL HISTORY  09/09/2015    cystoscopy, urethral dilatation, bladder tumor follow up    UPPER GASTROINTESTINAL ENDOSCOPY N/A 7/23/2018    EGD BIOPSY performed by Vanessa Rose MD at [place] [address] 5/9/2022    EGD BIOPSY performed by Mike Song MD at [address] [place] [address]       Medications Prior to Admission:    Prior to Admission medications    Medication Sig Start Date End Date Taking?  Authorizing Provider   furosemide (LASIX) 20 MG tablet TAKE 1 TABLET BY MOUTH DAILY 2/13/23   [facility]) 180 MG extended release capsule TAKE 2 CAPSULES ONCE DAILY 2/13/23   Britta Tellez MD   FEROSUL 325 (65 Fe) MG tablet TAKE 1 TABLET BY MOUTH DAILY WITH BREAKFAST 2/7/23   Britta Tellez MD   albuterol sulfate HFA (PROVENTIL;VENTOLIN;PROAIR) 108 (90 Base) MCG/ACT inhaler INHALE 2 PUFFS EVERY 6 HOURS AS NEEDED FOR WHEEZING 1/24/23   Britta Tellez MD   allopurinol (ZYLOPRIM) 300 MG tablet TAKE 1 TABLET BY MOUTH DAILY 1/16/23   Britta Tellez MD   atorvastatin (LIPITOR) 40 MG tablet TAKE 1 TABLET BY MOUTH DAILY 1/16/23   Britta Tellez MD   insulin glargine (LANTUS SOLOSTAR) 100 UNIT/ML injection pen INJECT 12 UNITS INTO THE SKIN DAILY WITH SUPPER 1/11/23   Britta Tellez MD   ondansetron [facility]) 4 MG tablet Take 1 tablet by mouth every 6 hours as needed for Nausea or Vomiting 1/5/23   NAYELY Kruse CNP   thiamine 100 MG tablet TAKE 1 TABLET BY MOUTH DAILY 12/9/22   NAYELY Kruse CNP   metoprolol succinate (TOPROL XL) 50 MG extended release tablet TAKE ONE TABLET BY MOUTH EVERY DAY 11/15/22   Britta Tellez MD   DULoxetine (CYMBALTA) 60 MG extended release capsule TAKE ONE CAPSULE BY MOUTH EVERY DAY 10/11/22   Britta Tellez MD   nicotine polacrilex (NICORETTE) 2 MG gum Chew one piece of gum every 3 to 4  hours as needed for smoking cessation. No more than 5 pieces of gum in a day. 8/8/22   NAYELY Vergara CNP   Alcohol Swabs PADS USE AS DIRECTED TO TEST AND INSULIN 8/3/22   Britta Tellez MD   fluticasone-umeclidin-vilant (TRELEGY ELLIPTA) 046-01.8-92 MCG/INH AEPB Inhale 1 puff into the lungs in the morning.  8/2/22   Britta Tellez MD   vitamin D (VITAMIN D3 ULTRA STRENGTH) 125 MCG (5000 UT) CAPS capsule Take 1 capsule by mouth daily 7/12/22   Britta Tellez MD   folic acid (FOLVITE) 1 MG tablet TAKE 1 TABLET BY MOUTH DAILY 6/23/22   Aileen Love Jessica Logan MD   pantoprazole (PROTONIX) 40 MG tablet TAKE ONE TABLET BY MOUTH EVERY MORNING BEFORE BREAKFAST 4/13/22   NAYELY Garcia CNP   TRUEplus Lancets 30G MISC TEST 2 TIMES DAILY 4/6/22   Nenita Olivier MD   TRUE METRIX BLOOD GLUCOSE TEST strip USE 1 STRIP IN VITRO ROUTE 2 TIMES DAILY 4/6/22   Nenita Olivier MD   PENTIPS 31G X 8 MM MISC USE ONCE DAILY WITH INJECTION 1/19/22   Nenita Olivier MD   oxyCODONE (ROXICODONE) 5 MG immediate release tablet Take 10 mg by mouth every 8 hours as needed for Pain. Taking 0.5 tablet (10mg) q8h prn (noted 5/12/2022)    Historical Provider, MD   gabapentin (NEURONTIN) 300 MG capsule Take 900 mg by mouth 3 times daily. 1/20/20   Historical Provider, MD   Blood Pressure Monitoring (BLOOD PRESSURE CUFF) MISC Please dispense insurance preference 7/18/19   Nenita Olivier MD   Blood Glucose Monitoring Suppl (FREESTYLE FREEDOM LITE) w/Device KIT 1 kit by Does not apply route daily 12/14/18   NAYELY Garcia CNP   OXYGEN Inhale 3 L/min into the lungs nightly as needed (and daily prn)     Historical Provider, MD       Allergies:  Lisinopril, Ace inhibitors, Influenza vaccines, Spiriva handihaler [tiotropium bromide monohydrate], and Vilanterol    Social History:    TOBACCO:   reports that he has been smoking cigarettes and cigars. He started smoking about 4 years ago. He has a 42.00 pack-year smoking history. He has quit using smokeless tobacco.  His smokeless tobacco use included snuff. ETOH:   reports current alcohol use of about 6.0 standard drinks per week. Family History:  Reviewed in detail and negative for DM, Early CAD, Cancer (except as below).  Positive as follows:        Problem Relation Age of Onset    Cancer Mother         Lung    Emphysema Father     Diabetes Sister     Hypertension Sister     Cancer Brother         throat    Asthma Neg Hx        REVIEW OF SYSTEMS:   Pertinent positives/negatives as follows: SOB/FALCON, increased O2 demand, R forearm redness, pain and swelling, recent fall/syncope, productive cough, intermittent CP, and as discussed in HPI, otherwise a complete ROS performed and all other systems are negative. PHYSICAL EXAM PERFORMED:  /62   Pulse 88   Temp 97.3 °F (36.3 °C) (Oral)   Resp 18   Wt 220 lb (99.8 kg)   SpO2 96%   BMI 34.46 kg/m²   GEN:  A&Ox3. Appears ill. Appears older than stated age. HEENT:  NC/AT,EOMI, semi dry MM, no erythema/exudates or visible masses. CVS:  Normal S1,S2. RRR. Without M/G/R.   LUNG:   Bilat wheezes. No rales or rhonchi. ABD:  Soft, ND/NT, BS+ x4. Without G/R.  EXT: 2+ pulses, no c/c/e. Brisk cap refill. All pulses in RUE are palpable. PSY:  Thought process intact, affect appropriate. DAMASO:  CN III-XII grossly intact. Moves all 4 spontaneously. Sensory grossly intact. SKIN: Erythema, calor, selling and ttp to much of R forearm.  ~ 2.5 cm wound to lateral R forearm w/ purulent drainage. There is a large white area where the skin is more movable when compared to surrounding skin. This area is superficial only. With compression of the white area increased purulent fluid is noted at the wound. I think this likely represents skin shearing/tear but is still attached to surrounding skin. Chart review shows recent radiographs:  XR RADIUS ULNA RIGHT (2 VIEWS)    Result Date: 2/28/2023  EXAMINATION: TWO XRAY VIEWS OF THE RIGHT FOREARM 2/28/2023 3:44 pm COMPARISON: None. HISTORY: ORDERING SYSTEM PROVIDED HISTORY: cellulitis TECHNOLOGIST PROVIDED HISTORY: Reason for exam:->cellulitis Reason for Exam: cellulitis FINDINGS: No fracture. No suspicious osseous lesions. No periostitis. No soft tissue gas. No evidence of osteomyelitis.      CT HEAD WO CONTRAST    Result Date: 2/28/2023  EXAMINATION: CT OF THE HEAD WITHOUT CONTRAST  2/28/2023 3:46 pm TECHNIQUE: CT of the head was performed without the administration of intravenous contrast. Automated exposure control, iterative reconstruction, and/or weight based adjustment of the mA/kV was utilized to reduce the radiation dose to as low as reasonably achievable. COMPARISON: None. HISTORY: ORDERING SYSTEM PROVIDED HISTORY: fall TECHNOLOGIST PROVIDED HISTORY: Reason for exam:->fall Has a \"code stroke\" or \"stroke alert\" been called? ->No Decision Support Exception - unselect if not a suspected or confirmed emergency medical condition->Emergency Medical Condition ([place]) Reason for Exam: Shortness of Breath (X2 days. Hx COPD; cp with inspiration; abrasion to Right elbow from fall a couple days ago. 125 solu-medrol; 1 Duoneb and 324 ASA given in route ) Additional signs and symptoms: fall FINDINGS: BRAIN/VENTRICLES: There is no acute intracranial hemorrhage, mass effect or midline shift. No abnormal extra-axial fluid collection. The gray-white differentiation is maintained without evidence of an acute infarct. There is no evidence of hydrocephalus. Ventricles are mildly prominent. No midline shift. Hypodensity in the periventricular white matter. Calcifications along the falx. ORBITS: The visualized portion of the orbits demonstrate no acute abnormality. SINUSES: Thickening in the left maxillary sinus. Minimal thickening in the ethmoid sinuses. Small left mastoid effusion. SOFT TISSUES/SKULL:  No acute abnormality of the visualized skull or soft tissues. No acute intracranial abnormality. CT CERVICAL SPINE WO CONTRAST    Result Date: 2/28/2023  EXAMINATION: CT OF THE CERVICAL SPINE WITHOUT CONTRAST 2/28/2023 3:46 pm TECHNIQUE: CT of the cervical spine was performed without the administration of intravenous contrast. Multiplanar reformatted images are provided for review. Automated exposure control, iterative reconstruction, and/or weight based adjustment of the mA/kV was utilized to reduce the radiation dose to as low as reasonably achievable.  COMPARISON: MRI cervical spine 05/20/2022. HISTORY: ORDERING SYSTEM PROVIDED HISTORY: fall, right paraspinosu pain TECHNOLOGIST PROVIDED HISTORY: Reason for exam:->fall, right paraspinosu pain Decision Support Exception - unselect if not a suspected or confirmed emergency medical condition->Emergency Medical Condition ([place]) Reason for Exam: Shortness of Breath (X2 days. Hx COPD; cp with inspiration; abrasion to Right elbow from fall a couple days ago. 125 solu-medrol; 1 Duoneb and 324 ASA given in route ) FINDINGS: BONES/ALIGNMENT: There is no acute fracture or traumatic malalignment. DEGENERATIVE CHANGES: Moderate to severe multilevel degenerative changes. SOFT TISSUES: There is no prevertebral soft tissue swelling. No acute abnormality of the cervical spine. CT ABDOMEN PELVIS W IV CONTRAST Additional Contrast? None    Result Date: 2/28/2023  EXAMINATION: CT OF THE ABDOMEN AND PELVIS WITH CONTRAST 2/28/2023 4:14 pm TECHNIQUE: CT of the abdomen and pelvis was performed with the administration of intravenous contrast. Multiplanar reformatted images are provided for review. Automated exposure control, iterative reconstruction, and/or weight based adjustment of the mA/kV was utilized to reduce the radiation dose to as low as reasonably achievable. COMPARISON: 05/24/2021 HISTORY: ORDERING SYSTEM PROVIDED HISTORY: rlq abdomianl pain TECHNOLOGIST PROVIDED HISTORY: Reason for exam:->rlq abdomianl pain Additional Contrast?->None Decision Support Exception - unselect if not a suspected or confirmed emergency medical condition->Emergency Medical Condition ([place]) Reason for Exam: Shortness of Breath (X2 days. Hx COPD; cp with inspiration; abrasion to Right elbow from fall a couple days ago. 125 solu-medrol; 1 Duoneb and 324 ASA given in route ) FINDINGS: Heart: Heart size is normal. No effusions. Liver: Liver is shrnnken with a nodular contour. This is consistent with cirrhosis. Overall density is decreased. Liver is elongated at 18.3 cm.  Spleen: Normal size. No enhancing masses Pancreas: No enhancing masses. No ductal dilation. No adjacent fatty stranding. Gallbladder no calcified stones or sludge. No pericholecystic fluid. No wall thickening. Bile ducts: No biliary ductal dilation Adrenals: Adrenal glands are mildly enlarged. This is similar in appearance to the previous study. There are still grossly adreniform. Kidneys: Kidneys are normal in appearance. No hydronephrosis. No enhancing masses. Norenal stones. Hypodense nodule consistent with a cyst on the right kidney which is similar. [facility]: No small bowel dilation. No colonic wall thickening. No large mass. The stomach is unremarkable in appearance. A few scattered colonic diverticula. . Mesentery: No enlarged lymphadenopathy. No free fluid. No free gas. Aorta: Aorta is densely atherosclerotic. The lumen is irregular distally. There is a large amount of plaque. Additionally, there is been a previous anterior graft for the aorta bi-iliac arteries. 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 The native common iliac arteries have a small amount of contrast within it with a are densely calcified. The graft is patent and unremarkable. Dense atherosclerotic change of the origin of the SMA but is patent. Dense atherosclerotic changes at the renal arteries. .  Negative for dissection. IVC is unremarkable. Celiac axis and SMA are patent. Portal vein is patent. PELVIS GI: No small bowel dilation. No colonic wall thickening. No enlarged lymphadenopathy. Appendix is visualized and is unremarkable. No free fluid in the pelvis. : The bladder wall appears to be thickened but it is under distended. . Prostate is not enlarged. Tiny calcifications in the prostate Lungs: The lung bases were reviewed. Lung bases are clear. Osseous: Osteoarthritic change of the SI joints and the hips. 1. No focal abnormality in the right lower quadrant. 2. Early findings for cirrhosis.      XR CHEST PORTABLE    Result Date: 2/28/2023  EXAMINATION: ONE XRAY VIEW OF THE CHEST 2/28/2023 3:44 pm COMPARISON: Chest x-ray dated 5 May 2022. HISTORY: ORDERING SYSTEM PROVIDED HISTORY: sob TECHNOLOGIST PROVIDED HISTORY: Reason for exam:->sob Reason for Exam: SOB; recent fall/syncopy FINDINGS: Normal cardiomediastinal silhouette. No acute airspace infiltrate. No pneumothorax or pleural effusion     No acute cardiopulmonary findings     CT CHEST PULMONARY EMBOLISM W CONTRAST    Result Date: 2/28/2023  EXAMINATION: CTA OF THE CHEST 2/28/2023 3:50 pm TECHNIQUE: CTA of the chest was performed after the administration of intravenous contrast.  Multiplanar reformatted images are provided for review. MIP images are provided for review. Automated exposure control, iterative reconstruction, and/or weight based adjustment of the mA/kV was utilized to reduce the radiation dose to as low as reasonably achievable. COMPARISON: 11/16/2022 HISTORY: ORDERING SYSTEM PROVIDED HISTORY: syncopy, chest pain, sob TECHNOLOGIST PROVIDED HISTORY: Reason for exam:->syncopy, chest pain, sob Decision Support Exception - unselect if not a suspected or confirmed emergency medical condition->Emergency Medical Condition ([place]) Reason for Exam: Shortness of Breath (X2 days. Hx COPD; cp with inspiration; abrasion to Right elbow from fall a couple days ago. 125 solu-medrol; 1 Duoneb and 324 ASA given in route ) FINDINGS: Pulmonary Arteries: Pulmonary arteries are adequately opacified for evaluation. No evidence of intraluminal filling defect to suggest pulmonary embolism. Main pulmonary artery is normal in caliber. Mediastinum: Atherosclerotic change of the aorta and the great vessels. Calcified mediastinal lymph nodes. Heart size is within normal limits. Lungs/pleura: Mild emphysema. Mild basilar atelectasis. No consolidation. Trachea and bronchi are patent. Upper Abdomen: Liver contour is slightly nodular.  Soft Tissues/Bones: Spondylosis     No evidence of pulmonary embolism or acute pulmonary abnormality. CT LUMBAR SPINE TRAUMA RECONSTRUCTION    Result Date: 2/28/2023  EXAMINATION: CT OF THE LUMBAR SPINE WITHOUT CONTRAST  2/28/2023 TECHNIQUE: CT of the lumbar spine was performed without the administration of intravenous contrast. Multiplanar reformatted images are provided for review. Adjustment of mA and/or kV according to patient size was utilized. Automated exposure control, iterative reconstruction, and/or weight based adjustment of the mA/kV was utilized to reduce the radiation dose to as low as reasonably achievable. COMPARISON: None HISTORY: ORDERING SYSTEM PROVIDED HISTORY: fall pain TECHNOLOGIST PROVIDED HISTORY: Reason for exam:->fall pain Reason for Exam: Shortness of Breath (X2 days. Hx COPD; cp with inspiration; abrasion to Right elbow from fall a couple days ago. 125 solu-medrol; 1 Duoneb and 324 ASA given in route ) FINDINGS: BONES/ALIGNMENT: There is normal alignment of the spine. The vertebral body heights are maintained. No osseous destructive lesion is seen. DEGENERATIVE CHANGES: Lateral spurs at multiple levels. L2-3 has a concentric disc bulge. Ligamentum flavum hypertrophy mild facet arthropathy. L3-4 has anterior spurring. Large concentric disc bulge. Ligamentum flavum hypertrophy. Mild canal stenosis. L4-5 is minimal anterolisthesis. Facet arthropathy more prominent on the right. Large spurs. Is central disc protrusion. Ligament flavum hypertrophy. Moderate canal stenosis. High-grade narrowing of the right neural foramen and mild on the left. SOFT TISSUES/RETROPERITONEUM: No paraspinal mass is seen. Unremarkable non-contrast CT of the lumbar spine.        EKG:    EKG 12 Lead [[id number]]    Collected: 02/28/23 1500    Updated: 02/28/23 1713     Ventricular Rate 103 BPM    Atrial Rate 103 BPM    P-R Interval 210 ms    QRS Duration 84 ms    Q-T Interval 342 ms    QTc Calculation (Bazett) 448 ms    P Axis 59 degrees    R Axis -47 degrees T Axis 74 degrees    Diagnosis --    Sinus tachycardia with 1st degree A-V blockLeft axis deviationPulmonary disease patternSeptal infarct , age undeterminedAbnormal ECGWhen compared with ECG of 05-MAY-2022 12:51,No significant change was foundConfirmed by Ashleigh Bates MD, MADHAV ([id number]) on 2/28/2023 5:13:51 PM     EKG 12 Lead [[id number]]    Collected: 02/28/23 1819    Updated: 02/28/23 2035     Ventricular Rate 88 BPM    Atrial Rate 88 BPM    P-R Interval 222 ms    QRS Duration 84 ms    Q-T Interval 362 ms    QTc Calculation (Bazett) 438 ms    P Axis 55 degrees    R Axis -52 degrees    T Axis 68 degrees    Diagnosis Sinus rhythm with 1st degree A-V blockLeft axis deviationSeptal infarct (cited on or before 28-FEB-2023)Abnormal ECGWhen compared with ECG of 28-FEB-2023 15:00,No significant change was found       CBC:  Recent Labs     02/28/23  1509   WBC 29.5*   HGB 14.0   HCT 42.6         RENAL  Recent Labs     02/28/23  1509      K 3.5   CL 96*   CO2 29   BUN 25*   CREATININE 1.8*   GLUCOSE 181*     Hemoglobin a1c:  Lab Results   Component Value Date    LABA1C 5.4 01/10/2023    LABA1C 6.8 05/08/2022    LABA1C 6.0 08/10/2021       LFT'S:  Recent Labs     02/28/23  1509   AST 30   ALT 15   BILITOT 0.6   ALKPHOS 107     COAG:  Recent Labs     02/28/23  1509   INR 1.25*     CARDIAC ENZYMES:   Recent Labs     02/28/23  1509   TROPONINI <0.01     Lab Results   Component Value Date    PROBNP 3,481 (H) 02/28/2023    PROBNP 170 (H) 05/05/2022     LACTIC ACID:  Ordered. U/A:  Ordered    VBG:  Recent Labs     02/28/23  1509   PHVEN 7.431   XKL8OCR 40.6   EQN4PZQ 26.4   PO2VEN 35.2   P9PCGQBP 71        PHYSICIAN CERTIFICATION  I certify that Candi Narvaez is expected to be hospitalized for 2 midnights based on the following assessment and plan:    ASSESSMENT/PLAN:  Sepsis (WBC, RR, HR; lact pending, PCT added on). Probable dermal source. IV vanco and cefepime. F/u Cx. No need for pressors at this time. Acute on chronic respiratory failure with hypoxia. Likely related to aeCOPD. Currently requiring 3 L O2. Patient normally 2-3L ON and PRN w/ exertion. Recently, requiring at rest during the day. Has been requiring since arrival.  Supplemental O2 to maintain SPO2 ? 92%, continuous pulse ox. Wean as tolerated. Pulm c/s. R forearm cellulitis, fairly extensive. Large area of still attached but sloughed skin which has expressible purulent drainage. May require debridement. ABX as above. PRN Percocet. WC RN c/s. No OM on plain film. aeCOPD. IV solumedrol, IV ABX as above. PRN/RUTH intensive NEB therapy. Check respiratory culture and procalcitonin. Pulm consult. Hold home regimen for now. Syncope and collapse. 3-4 d ago. Says 2/2 coughing fit and he couldn't breathe. Tele. Chk orthostatics an echo. Cards c/s. MICHAEL, Cr 1.8. Last was 1.1 in 7/22. IVF and repeat   DM2, hold oral Rx, chk A1c, add Mod SSI q4h, reduced home Lantus dose   Elevated CK, 486. IVF and repeats q6h. Hypo Mg, 1.2.  4g MgSO4 ordered. WBC, 29.5. Tobacco Abuse. He quit when he fell 4 days ago. Counseled continued cessation, 2 mg nicotine gum     DVT Prophylaxis:   Diet: NPO for Cards c/s. Code Status: Full Code   PT/OT Eval Status: Will order if needed and as patient condition allows  Dispo - Admit to inpatient PCU    Dai Webster MD    Thank you Idalmis Jaime MD for the opportunity to be involved in this patient's care. If you have any questions or concerns please feel free to contact me via the [employer] at [phone]. This chart was generated using the [address] [place] [address] [address] [employer] system. I created this record but it may contain dictation errors given the limitations of this technology.

## 2023-03-01 NOTE — CONSULTS
Pharmacy Note  Vancomycin Consult    Chin Charlton is a 78 y.o. male started on Vancomycin for SSTI; consult received from Dr. Meli Calderon to manage therapy. Also receiving the following antibiotics: Cefepime. Allergies:  Lisinopril, Ace inhibitors, Influenza vaccines, Spiriva handihaler [tiotropium bromide monohydrate], and Vilanterol     Tmax:     Recent Labs     02/28/23  1509   CREATININE 1.8*       Recent Labs     02/28/23  1509   WBC 29.5*       Estimated Creatinine Clearance: 45 mL/min (A) (based on SCr of 1.8 mg/dL (H)). Intake/Output Summary (Last 24 hours) at 3/1/2023 0242  Last data filed at 3/1/2023 0105  Gross per 24 hour   Intake 1650 ml   Output --   Net 1650 ml       Wt Readings from Last 1 Encounters:   03/01/23 205 lb 8 oz (93.2 kg)         Body mass index is 31.48 kg/m². Culture Date      Source                       Results      Loading dose (critically ill or in ICU, require dialysis or renal replacement therapy): Vancomycin 25 mg/kg IVPB x 1 (maximum 2500 mg). Maintenance dose: 15 mg/kg (maximum: 2000 mg/dose and 4500 mg/day) starting at the next dosing interval determined by renal function  Goal Vancomycin trough: 10 - 20 mcg/mL   Goal Vancomycin AUC: 400-600     Assessment/Plan:  Will initiate Vancomycin with a one time loading dose of 2000 mg x1, followed by 500 mg IV every 12 hours. Calculated . Vancomycin level ordered for 3/1 at 1700. Timing of trough level will be determined based on culture results, renal function, and clinical response. Thank you for the consult.   Melissa Alston, PharmD  3/1/2023 2:43 AM

## 2023-03-01 NOTE — CONSULTS
[facility]                                 Pulmonary Consult /Progress Note :                                                                  CHIEF COMPLAINT: Shortness of breath    Consulting provider: Dr. Diane Puente    HPI:     Patient is 69-year-old male with history of 100 pack/year smoking history who is current smoker, presented to the hospital with worsening shortness of breath and dyspnea on exertion along with increased O2 demands    The patient also has been complaining of productive cough clear to yellow and that has been going on for the last few days    Also it was noticed that the patient had right upper arm swelling and redness    The patient uses 3 L of oxygen at home and he use inhalers and he follows with Dr. Filipe Cloud    He stated he was in good health until last 2 days where his breathing was declined and he was not feeling well    He was admitted to the hospital started on IV antibiotics and IV steroids nebulizer and he started to feel slightly better      He had CT scan of the chest that did not show any lung nodule or pulmonary    Had ebus in the past was ok ''      Past Medical History:   Diagnosis Date    Bronchitis chronic     Chest pain     Chronic cough     Cirrhosis of liver ([facility] [address] 01/01/2017    stage 4     COPD (chronic obstructive pulmonary disease) (HCC)     COPD (chronic obstructive pulmonary disease) (HCC)     Diabetes mellitus ([address]     Gout     Hilar adenopathy     Hyperlipidemia     Hypertension     Knee pain, right     Numbness and tingling of leg     Osteoarthritis     Paresthesia of bilateral legs     PVD (peripheral vascular disease) ([address]     Seizures ([address]     ongoing, began after swine flu vaccination    Substance abuse [facility])        Past Surgical History:        Procedure Laterality Date    AORTO-ILIAC BYPASS GRAFT N/A 08/24/2020    AORTOBIFEMORAL BYPASS GRAFT performed by Yemi Browning MD at [address] [place] [address] 02/07/2011    bronch with EBUS    CATARACT REMOVAL Left     COLONOSCOPY N/A 07/23/2018    COLONOSCOPY WITH BIOPSY performed by Loni Plascencia MD at [address]    COLONOSCOPY N/A 07/23/2018    COLONOSCOPY POLYPECTOMY SNARE/COLD BIOPSY performed by Loni Plascencia MD at [address] [facility]  05/10/2022    COLONOSCOPY N/A 05/10/2022    COLON W/ANES. performed by Sharyle Fruit, MD at [address] [facility]  02/04/2015    Urethral Dilatation, Resection of Bladder Tumor    CYSTOSCOPY  02/24/2016    fulgeration of bladder tumor    ELBOW SURGERY Left     ENDOSCOPY, SMALL BOWEL N/A 02/20/2019    BOWEL SMALL CAPSULE ENDOSCOPY performed by Loni Plascencia MD at [address] [facility]  05/06/2014    cystoscopy, bladder biopsy    OTHER SURGICAL HISTORY  09/09/2015    cystoscopy, urethral dilatation, bladder tumor follow up    UPPER GASTROINTESTINAL ENDOSCOPY N/A 07/23/2018    EGD BIOPSY performed by Loni Plascencia MD at [address] [facility] 05/09/2022    EGD BIOPSY performed by Sharyle Fruit, MD at [facility]       Allergies:  is allergic to lisinopril, ace inhibitors, influenza vaccines, spiriva handihaler [tiotropium bromide monohydrate], and vilanterol. Social History:    TOBACCO:   reports that he quit smoking 5 days ago. His smoking use included cigarettes and cigars. He started smoking about 4 years ago. He has a 42.00 pack-year smoking history. He has quit using smokeless tobacco.  His smokeless tobacco use included snuff. ETOH:   reports current alcohol use of about 6.0 standard drinks per week.       Family History:       Problem Relation Age of Onset    Cancer Mother         Lung    Emphysema Father     Diabetes Sister     Hypertension Sister     Cancer Brother         throat    Asthma Neg Hx        Current Medications:    Current Facility-Administered Medications: albuterol (PROVENTIL) nebulizer solution 2.5 mg, 2.5 mg, Nebulization, Q6H PRN, Sondra Estrada MD    oxyCODONE-acetaminophen (PERCOCET) 5-325 MG per tablet 1 tablet, 1 tablet, Oral, Q6H PRN **OR** oxyCODONE-acetaminophen (PERCOCET) 5-325 MG per tablet 2 tablet, 2 tablet, Oral, Q6H PRN, Sondra Estrada MD    magnesium sulfate 1000 mg in dextrose 5% 100 mL IVPB, 1,000 mg, IntraVENous, PRN, Sondra Estrada MD    potassium chloride (KLOR-CON M) extended release tablet 40 mEq, 40 mEq, Oral, PRN **OR** potassium bicarb-citric acid (EFFER-K) effervescent tablet 40 mEq, 40 mEq, Oral, PRN **OR** potassium chloride 10 mEq/100 mL IVPB (Peripheral Line), 10 mEq, IntraVENous, PRN, Sondra Estrada MD    insulin glargine (LANTUS) injection vial 6 Units, 6 Units, SubCUTAneous, Nightly, Sondra Estrada MD    folic acid (FOLVITE) tablet 1,000 mcg, 1,000 mcg, Oral, Daily, Sondra Estrada MD, 1,000 mcg at 03/01/23 1002    ferrous sulfate (IRON 325) tablet 325 mg, 325 mg, Oral, Daily with breakfast, Sondra Estrada MD, 325 mg at 03/01/23 1003    DULoxetine (CYMBALTA) extended release capsule 60 mg, 60 mg, Oral, Daily, Sondra Estrada MD, 60 mg at 03/01/23 1003    dilTIAZem (CARDIZEM CD) extended release capsule 180 mg, 180 mg, Oral, Daily, Sondra Estrada MD, 180 mg at 03/01/23 1003    atorvastatin (LIPITOR) tablet 40 mg, 40 mg, Oral, Daily, Sondra Estrada MD, 40 mg at 03/01/23 1003    metoprolol succinate (TOPROL XL) extended release tablet 50 mg, 50 mg, Oral, Daily, Sondra Estrada MD, 50 mg at 03/01/23 1002    nicotine polacrilex (NICORETTE) gum 2 mg, 2 mg, Oral, Q2H PRN, Sondra Estrada MD    perflutren lipid microspheres (DEFINITY) injection 1.5 mL, 1.5 mL, IntraVENous, ONCE PRN, Sondra Estrada MD    pantoprazole (PROTONIX) tablet 40 mg, 40 mg, Oral, Daily, Sondra Estrada MD, 40 mg at 03/01/23 1002    thiamine tablet 100 mg, 100 mg, Oral, Daily, Sondra Estrada MD, 100 mg at 03/01/23 1002 therapeutic multivitamin-minerals 1 tablet, 1 tablet, Oral, Daily, Malachi Arredondo MD, 1 tablet at 03/01/23 1005    Vitamin D (CHOLECALCIFEROL) tablet 1,000 Units, 1,000 Units, Oral, Daily, Malachi Arredondo MD, 1,000 Units at 03/01/23 1003    glucose chewable tablet 16 g, 4 tablet, Oral, PRN, Malachi Arredondo MD    dextrose bolus 10% 125 mL, 125 mL, IntraVENous, PRN **OR** dextrose bolus 10% 250 mL, 250 mL, IntraVENous, PRN, Malachi rAredondo MD    glucagon (rDNA) injection 1 mg, 1 mg, SubCUTAneous, PRN, Malachi Arredondo MD    dextrose 10 % infusion, , IntraVENous, Continuous PRN, Malachi Arredondo MD    sodium chloride flush 0.9 % injection 5-40 mL, 5-40 mL, IntraVENous, 2 times per day, Malachi Arredondo MD    sodium chloride flush 0.9 % injection 5-40 mL, 5-40 mL, IntraVENous, PRN, Malachi Arredondo MD    0.9 % sodium chloride infusion, , IntraVENous, PRN, Malachi Arredondo MD    ondansetron (ZOFRAN-ODT) disintegrating tablet 4 mg, 4 mg, Oral, Q8H PRN **OR** ondansetron (ZOFRAN) injection 4 mg, 4 mg, IntraVENous, Q6H PRN, Malachi Arredondo MD    polyethylene glycol (GLYCOLAX) packet 17 g, 17 g, Oral, Daily PRN, Malachi Arredondo MD, 17 g at 03/01/23 1011    enoxaparin (LOVENOX) injection 40 mg, 40 mg, SubCUTAneous, Daily, Malachi Arredondo MD, 40 mg at 03/01/23 1003    acetaminophen (TYLENOL) tablet 650 mg, 650 mg, Oral, Q6H PRN **OR** acetaminophen (TYLENOL) suppository 650 mg, 650 mg, Rectal, Q6H PRN, Malachi Arredondo MD    0.9 % sodium chloride infusion, , IntraVENous, Continuous, Malachi Arredondo MD, Last Rate: 75 mL/hr at 03/01/23 0221, New Bag at 03/01/23 0221    methylPREDNISolone sodium (SOLU-MEDROL) injection 40 mg, 40 mg, IntraVENous, Q12H, 40 mg at 03/01/23 1004 **FOLLOWED BY** [START ON 3/3/2023] predniSONE (DELTASONE) tablet 40 mg, 40 mg, Oral, Daily, Malachi Arredondo MD    insulin lispro (HUMALOG) injection vial 0-8 Units, 0-8 Units, SubCUTAneous, Q4H, Malachi Arredondo MD    cefepime  (MAXIPIME) 2,000 mg in sodium chloride 0.9 % 100 mL IVPB (mini-bag), 2,000 mg, IntraVENous, Q12H, Evan Fernandez MD, Stopped at 03/01/23 1004    vancomycin (VANCOCIN) 500 mg in sodium chloride 0.9 % 100 mL IVPB (mini-bag), 500 mg, IntraVENous, Q12H, Evan Fernandez MD, Stopped at 03/01/23 2107    ipratropium-albuterol (DUONEB) nebulizer solution 1 ampule, 1 ampule, Inhalation, Q4H WA, Bren Bryant MD, 1 ampule at 03/01/23 7095    aspirin EC tablet 81 mg, 81 mg, Oral, Daily, Emilia Brown MD, 81 mg at 03/01/23 1003    calcium carbonate (TUMS) chewable tablet 500 mg, 500 mg, Oral, TID PRN, Evan Fernandez MD, 500 mg at 02/28/23 1929      REVIEW OF SYSTEMS:  Constitutional: Negative for fever  HENT: Negative for sore throat  Eyes: Negative for redness   Respiratory: +dyspnea, cough  Cardiovascular: Negative for chest pain  Gastrointestinal: Negative for vomiting, diarrhea   Genitourinary: Negative for hematuria   Musculoskeletal: Negative for arthralgias   Skin: Negative for rash  Neurological: Negative for syncope  Hematological: Negative for adenopathy  Psychiatric/Behavorial: Negative for anxiety      Objective:   PHYSICAL EXAM:    Blood pressure (!) 110/56, pulse 87, temperature 97.7 °F (36.5 °C), temperature source Oral, resp. rate 16, height 5' 7.75\" (1.721 m), weight 205 lb 8 oz (93.2 kg), SpO2 96 %.' on RA  Gen: No distress. Eyes: PERRL. No sclera icterus. No conjunctival injection. ENT: No discharge. Pharynx clear. Neck: Trachea midline. No obvious mass. Resp:scattered rhonchi/wheezing /rales/CTA bilateral   CV: Regular rate. Regular rhythm. No murmur or rub. No edema. GI: Non-tender. Non-distended. No hernia. Skin: Warm and dry. No nodule on exposed extremities. Lymph: No cervical LAD. No supraclavicular LAD. M/S: No cyanosis. No joint deformity. No clubbing. Neuro: Awake. Alert. Moves all four extremities. Psych: Oriented x 3. No anxiety. LABS/IMAGING:    CBC:  Lab Results   Component Value Date    WBC 28.0 (H) 03/01/2023    HGB 13.4 (L) 03/01/2023    HCT 41.9 03/01/2023    MCV 96.2 03/01/2023     03/01/2023    LYMPHOPCT 4.0 03/01/2023    RBC 4.36 03/01/2023    MCH 30.8 03/01/2023    MCHC 32.0 03/01/2023    RDW 15.2 03/01/2023    NEUTOPHILPCT 92.7 03/01/2023    MONOPCT 3.2 03/01/2023    EOSPCT 1.5 01/12/2011    BASOPCT 0.1 03/01/2023    NEUTROABS 25.9 (H) 03/01/2023    LYMPHSABS 1.1 03/01/2023    MONOSABS 0.9 03/01/2023    EOSABS 0.0 03/01/2023    BASOSABS 0.0 03/01/2023       Recent Labs     03/01/23  0803 02/28/23  1509   WBC 28.0* 29.5*   HGB 13.4* 14.0   HCT 41.9 42.6   MCV 96.2 95.5    257       BMP:   Recent Labs     02/28/23  1509 03/01/23  0803    136   K 3.5 3.7   CL 96* 101   CO2 29 25   BUN 25* 26*   CREATININE 1.8* 1.1       MG:   Lab Results   Component Value Date/Time    MG 1.20 02/28/2023 03:09 PM     Ca/Phos:   Lab Results   Component Value Date    CALCIUM 9.5 03/01/2023    PHOS 4.7 05/09/2022     LIVER PROFILE:   Recent Labs     02/28/23  1509 03/01/23  0803   AST 30 26   ALT 15 14   LIPASE 12.0*  --    BILITOT 0.6 0.4   ALKPHOS 107 89       PT/INR:   Recent Labs     02/28/23  1509   PROTIME 15.5*   INR 1.25*     APTT: No results for input(s): APTT in the last 72 hours.         MV Settings:     / / /     IV:   dextrose      sodium chloride      sodium chloride 75 mL/hr at 03/01/23 0221           Medications:  Scheduled Meds:   insulin glargine  6 Units SubCUTAneous Nightly    folic acid  2,453 mcg Oral Daily    ferrous sulfate  325 mg Oral Daily with breakfast    DULoxetine  60 mg Oral Daily    dilTIAZem  180 mg Oral Daily    atorvastatin  40 mg Oral Daily    metoprolol succinate  50 mg Oral Daily    pantoprazole  40 mg Oral Daily    thiamine  100 mg Oral Daily    multivitamin  1 tablet Oral Daily    Vitamin D  1,000 Units Oral Daily    sodium chloride flush  5-40 mL IntraVENous 2 times per day enoxaparin  40 mg SubCUTAneous Daily    methylPREDNISolone  40 mg IntraVENous Q12H    Followed by    Umesh Gauthier ON 3/3/2023] predniSONE  40 mg Oral Daily    insulin lispro  0-8 Units SubCUTAneous Q4H    cefepime  2,000 mg IntraVENous Q12H    vancomycin  500 mg IntraVENous Q12H    ipratropium-albuterol  1 ampule Inhalation Q4H WA    aspirin  81 mg Oral Daily       PRN Meds:  albuterol, oxyCODONE-acetaminophen **OR** oxyCODONE-acetaminophen, magnesium sulfate, potassium chloride **OR** potassium alternative oral replacement **OR** potassium chloride, nicotine polacrilex, perflutren lipid microspheres, glucose, dextrose bolus **OR** dextrose bolus, glucagon (rDNA), dextrose, sodium chloride flush, sodium chloride, ondansetron **OR** ondansetron, polyethylene glycol, acetaminophen **OR** acetaminophen, calcium carbonate    Results:  CBC:   Recent Labs     02/28/23  1509 03/01/23  0803   WBC 29.5* 28.0*   HGB 14.0 13.4*   HCT 42.6 41.9   MCV 95.5 96.2    231     BMP:   Recent Labs     02/28/23  1509 03/01/23  0803    136   K 3.5 3.7   CL 96* 101   CO2 29 25   BUN 25* 26*   CREATININE 1.8* 1.1     LIVER PROFILE:   Recent Labs     02/28/23  1509 03/01/23  0803   AST 30 26   ALT 15 14   LIPASE 12.0*  --    BILITOT 0.6 0.4   ALKPHOS 107 89     PT/INR:   Recent Labs     02/28/23  1509   PROTIME 15.5*   INR 1.25*     APTT: No results for input(s): APTT in the last 72 hours.   UA:  Recent Labs     03/01/23  1020   COLORU Yellow   PHUR 6.0   WBCUA 0-2   RBCUA 0-2   MUCUS 1+*   BACTERIA 2+*   CLARITYU Clear   SPECGRAV 1.020   LEUKOCYTESUR Negative   UROBILINOGEN 0.2   BILIRUBINUR Negative   BLOODU Negative   GLUCOSEU Negative       Cultures:      Films:  CXR reviewed by me and it showed no acute process           Assessment:       -Acute hypoxic resp failure   -Possible pneumonia  -Acute on chronic hyppoxic resp failure   -smoking   -Possible indra      Plan:      *- sat above 92-but less than 96   *-IV steroids   *-IV abx *-check viral panel negative  Pneumonia molecular panel   *- Sputum culture   *_If in distress ,will use CPAP/BiPAP  *- procalcitonin  *-BD  ICS   Strep pneum  legionella  Incentive spirmetery and flutter valve   Watch IVF   CT chest :follow up as OP   Check o2 at ambulation before discharge  If cultures negative can change PO abx       Thank you very much for allowing me to participate in the care of this pleasant patient , should you have any questions ,please do not hesitate to contact me      Shiraz Arnett MD,[facility]  Pulmonary&Critical Care Medicine    Danelle Second    NOTE: This report was transcribed using voice recognition software. Every effort was made to ensure accuracy; however, inadvertent computerized transcription errors may be present.

## 2023-03-01 NOTE — PROGRESS NOTES
03/01/23 0304   RT Protocol   History Pulmonary Disease 2   Respiratory pattern 0   Breath sounds 2   Cough 0   Indications for Bronchodilator Therapy Decreased or absent breath sounds; On home bronchodilators   Bronchodilator Assessment Score 4   RT Inhaler-Nebulizer Bronchodilator Protocol Note    There is a bronchodilator order in the chart from a provider indicating to follow the RT Bronchodilator Protocol and there is an Initiate RT Inhaler-Nebulizer Bronchodilator Protocol order as well (see protocol at bottom of note). CXR Findings:  XR CHEST PORTABLE    Result Date: 2/28/2023  No acute cardiopulmonary findings       The findings from the last RT Protocol Assessment were as follows:   History Pulmonary Disease: Chronic pulmonary disease  Respiratory Pattern: Regular pattern and RR 12-20 bpm  Breath Sounds: Slightly diminished and/or crackles  Cough: Strong, spontaneous, non-productive  Indication for Bronchodilator Therapy: Decreased or absent breath sounds, On home bronchodilators  Bronchodilator Assessment Score: 4    Aerosolized bronchodilator medication orders have been revised according to the RT Inhaler-Nebulizer Bronchodilator Protocol below. Respiratory Therapist to perform RT Therapy Protocol Assessment initially then follow the protocol. Repeat RT Therapy Protocol Assessment PRN for score 0-3 or on second treatment, BID, and PRN for scores above 3. No Indications - adjust the frequency to every 6 hours PRN wheezing or bronchospasm, if no treatments needed after 48 hours then discontinue using Per Protocol order mode. If indication present, adjust the RT bronchodilator orders based on the Bronchodilator Assessment Score as indicated below.   Use Inhaler orders unless patient has one or more of the following: on home nebulizer, not able to hold breath for 10 seconds, is not alert and oriented, cannot activate and use MDI correctly, or respiratory rate 25 breaths per minute or more, then use the equivalent nebulizer order(s) with same Frequency and PRN reasons based on the score. If a patient is on this medication at home then do not decrease Frequency below that used at home. 0-3 - enter or revise RT bronchodilator order(s) to equivalent RT Bronchodilator order with Frequency of every 4 hours PRN for wheezing or increased work of breathing using Per Protocol order mode. 4-6 - enter or revise RT Bronchodilator order(s) to two equivalent RT bronchodilator orders with one order with BID Frequency and one order with Frequency of every 4 hours PRN wheezing or increased work of breathing using Per Protocol order mode. 7-10 - enter or revise RT Bronchodilator order(s) to two equivalent RT bronchodilator orders with one order with TID Frequency and one order with Frequency of every 4 hours PRN wheezing or increased work of breathing using Per Protocol order mode. 11-13 - enter or revise RT Bronchodilator order(s) to one equivalent RT bronchodilator order with QID Frequency and an Albuterol order with Frequency of every 4 hours PRN wheezing or increased work of breathing using Per Protocol order mode. Greater than 13 - enter or revise RT Bronchodilator order(s) to one equivalent RT bronchodilator order with every 4 hours Frequency and an Albuterol order with Frequency of every 2 hours PRN wheezing or increased work of breathing using Per Protocol order mode.        Electronically signed by Renea Navarro RCP on 3/1/2023 at 3:07 AM

## 2023-03-01 NOTE — PLAN OF CARE
Problem: Discharge Planning  Goal: Discharge to home or other facility with appropriate resources  Outcome: Progressing  Flowsheets  Taken 3/1/2023 0224  Discharge to home or other facility with appropriate resources: Identify barriers to discharge with patient and caregiver  Taken 3/1/2023 0215  Discharge to home or other facility with appropriate resources: Identify barriers to discharge with patient and caregiver     Problem: Pain  Goal: Verbalizes/displays adequate comfort level or baseline comfort level  Outcome: Progressing  Flowsheets (Taken 3/1/2023 0147)  Verbalizes/displays adequate comfort level or baseline comfort level: Encourage patient to monitor pain and request assistance     Problem: Safety - Adult  Goal: Free from fall injury  Outcome: Progressing     Problem: Neurosensory - Adult  Goal: Achieves stable or improved neurological status  Outcome: Progressing  Flowsheets (Taken 3/1/2023 0224)  Achieves stable or improved neurological status: Assess for and report changes in neurological status     Problem: Respiratory - Adult  Goal: Achieves optimal ventilation and oxygenation  Outcome: Progressing  Flowsheets (Taken 3/1/2023 0224)  Achieves optimal ventilation and oxygenation: Assess for changes in respiratory status     Problem: Cardiovascular - Adult  Goal: Maintains optimal cardiac output and hemodynamic stability  Outcome: Progressing  Flowsheets (Taken 3/1/2023 0224)  Maintains optimal cardiac output and hemodynamic stability: Monitor blood pressure and heart rate     Problem: Skin/Tissue Integrity - Adult  Goal: Skin integrity remains intact  Outcome: Progressing  Flowsheets (Taken 3/1/2023 0224)  Skin Integrity Remains Intact: Monitor for areas of redness and/or skin breakdown     Problem: Musculoskeletal - Adult  Goal: Return mobility to safest level of function  Outcome: Progressing  Flowsheets (Taken 3/1/2023 0224)  Return Mobility to Safest Level of Function: Assess patient stability and activity tolerance for standing, transferring and ambulating with or without assistive devices     Problem: Gastrointestinal - Adult  Goal: Minimal or absence of nausea and vomiting  Outcome: Progressing  Flowsheets (Taken 3/1/2023 0224)  Minimal or absence of nausea and vomiting: Administer IV fluids as ordered to ensure adequate hydration     Problem: Infection - Adult  Goal: Absence of infection at discharge  Outcome: Progressing  Flowsheets (Taken 3/1/2023 0224)  Absence of infection at discharge: Assess and monitor for signs and symptoms of infection     Problem: Metabolic/Fluid and Electrolytes - Adult  Goal: Electrolytes maintained within normal limits  Outcome: Progressing  Flowsheets (Taken 3/1/2023 0224)  Electrolytes maintained within normal limits: Monitor labs and assess patient for signs and symptoms of electrolyte imbalances     Problem: Hematologic - Adult  Goal: Maintains hematologic stability  Outcome: Progressing  Flowsheets (Taken 3/1/2023 0224)  Maintains hematologic stability: Assess for signs and symptoms of bleeding or hemorrhage

## 2023-03-01 NOTE — PROGRESS NOTES
Responded to consult to visit pt. Pt expressing fear as today makes it 2 years he lost his wife. Pt also talked about his health and shared that it makes him feel worried. Writer offered listening presence and prayed with pt for healing as requested. Spiritual care available to follow prn.

## 2023-03-01 NOTE — PROGRESS NOTES
Pharmacy Vancomycin Consult     Vancomycin Day: 2  Current Dosinmg q12h  Current indication: SSTI        Recent Labs     23  1509 23  0803   BUN 25* 26*   CREATININE 1.8* 1.1   WBC 29.5* 28.0*       Intake/Output Summary (Last 24 hours) at 3/1/2023 1743  Last data filed at 3/1/2023 1004  Gross per 24 hour   Intake 1831.56 ml   Output 450 ml   Net 1381.56 ml     Culture Date      Source                       Results      Ht Readings from Last 1 Encounters:   23 5' 7.75\" (1.721 m)        Wt Readings from Last 1 Encounters:   23 205 lb 8 oz (93.2 kg)       Body mass index is 31.48 kg/m². Estimated Creatinine Clearance: 73 mL/min (based on SCr of 1.1 mg/dL). Trough: 14.1    Assessment/Plan:  Continue current dose. Loading dose: 2000 mg at 18:37 2023. Regimen: 500 mg IV every 12 hours.   Start time: 18:00 on 2023  Exposure target: AUC24 (range)400-600 mg/L.hr   AUC24,ss: 422 mg/L.hr  Probability of AUC24 > 400: 58 %  Ctrough,ss: 15.2 mg/L  Probability of Ctrough,ss > 20: 17 %  Probability of nephrotoxicity (Lodise PITO ): 10 %  Shabnam Co[place], [place]

## 2023-03-01 NOTE — PROGRESS NOTES
IM Progress Note    Admit Date:  2/28/2023    Admitted with sepsis, right arm cellulitis  COPD    Subjective:  Mr. Shanna Cardozo still appears ill and fatigued. Patient states he feels a little better than yesterday. Oxygen saturations  stable on 3 L.-This is his home oxygen setting. No fevers    Objective:   BP (!) 110/56   Pulse 87   Temp 97.7 °F (36.5 °C) (Oral)   Resp 16   Ht 5' 7.75\" (1.721 m)   Wt 205 lb 8 oz (93.2 kg)   SpO2 90%   BMI 31.48 kg/m²     Intake/Output Summary (Last 24 hours) at 3/1/2023 1658  Last data filed at 3/1/2023 1004  Gross per 24 hour   Intake 1831.56 ml   Output 450 ml   Net 1381.56 ml         Physical Exam:  General: Patient appears chronically ill. awake, alert, NAD  Skin:  Warm and dry  Neck:  JVD absent. Neck supple  Chest: Diminished breath sounds mild rhonchi . Cardiovascular:  RRR ,S1S2 normal  Abdomen:  Soft, non tender, non distended, BS +  Extremities:  No edema. Intact peripheral pulses.  Brisk cap refill, < 2 secs  Neuro: non focal      Medications:   Scheduled Meds:   insulin glargine  6 Units SubCUTAneous Nightly    folic acid  6,738 mcg Oral Daily    ferrous sulfate  325 mg Oral Daily with breakfast    DULoxetine  60 mg Oral Daily    dilTIAZem  180 mg Oral Daily    atorvastatin  40 mg Oral Daily    metoprolol succinate  50 mg Oral Daily    pantoprazole  40 mg Oral Daily    thiamine  100 mg Oral Daily    multivitamin  1 tablet Oral Daily    Vitamin D  1,000 Units Oral Daily    sodium chloride flush  5-40 mL IntraVENous 2 times per day    enoxaparin  40 mg SubCUTAneous Daily    methylPREDNISolone  40 mg IntraVENous Q12H    Followed by    Gali Hanna ON 3/3/2023] predniSONE  40 mg Oral Daily    insulin lispro  0-8 Units SubCUTAneous Q4H    cefepime  2,000 mg IntraVENous Q12H    vancomycin  500 mg IntraVENous Q12H    ipratropium-albuterol  1 ampule Inhalation Q4H WA    aspirin  81 mg Oral Daily       Continuous Infusions:   dextrose      sodium chloride      sodium chloride 75 mL/hr at 03/01/23 0221       Data:  CBC:   Recent Labs     02/28/23  1509 03/01/23  0803   WBC 29.5* 28.0*   RBC 4.46 4.36   HGB 14.0 13.4*   HCT 42.6 41.9   MCV 95.5 96.2   RDW 14.8 15.2    231     BMP:   Recent Labs     02/28/23  1509 03/01/23  0803    136   K 3.5 3.7   CL 96* 101   CO2 29 25   BUN 25* 26*   CREATININE 1.8* 1.1     BNP: No results for input(s): BNP in the last 72 hours. PT/INR:   Recent Labs     02/28/23  1509   PROTIME 15.5*   INR 1.25*     APTT: No results for input(s): APTT in the last 72 hours. CARDIAC ENZYMES:   Recent Labs     02/28/23  1509   TROPONINI <0.01     FASTING LIPID PANEL:  Lab Results   Component Value Date    CHOL 154 07/11/2022    HDL 46 07/11/2022    TRIG 111 07/11/2022     LIVER PROFILE:   Recent Labs     02/28/23  1509 03/01/23  0803   AST 30 26   ALT 15 14   BILITOT 0.6 0.4   ALKPHOS 107 89          Cultures  COVID/Influenza: not detected   Blood cultures positive for strep    Radiology  CT ABDOMEN PELVIS W IV CONTRAST Additional Contrast? None   Final Result   1. No focal abnormality in the right lower quadrant. 2. Early findings for cirrhosis. CT CHEST PULMONARY EMBOLISM W CONTRAST   Final Result   No evidence of pulmonary embolism or acute pulmonary abnormality. CT LUMBAR SPINE TRAUMA RECONSTRUCTION   Final Result   Unremarkable non-contrast CT of the lumbar spine. CT CERVICAL SPINE WO CONTRAST   Final Result   No acute abnormality of the cervical spine. CT HEAD WO CONTRAST   Final Result   No acute intracranial abnormality. XR RADIUS ULNA RIGHT (2 VIEWS)   Final Result   No evidence of osteomyelitis. XR CHEST PORTABLE   Final Result   No acute cardiopulmonary findings               Assessment:  Principal Problem:    Sepsis ([address]  Active Problems:    Cellulitis of right upper extremity  Resolved Problems:    * No resolved hospital problems.  *      Plan:    # Sepsis (WBC, RR, HR; lact pending, PCT added on). - Likely secondary to right arm cellulitis ,   -Blood cultures positive for strep . -  IV vanco and cefepime. Continue .  -White count 29 K on admission-> 28.5 today    #Acute on chronic respiratory failure with hypoxia. #Exacerbation of COPD  -On supplemental oxygen 3 L.  -Seen in consultation by pulmonology  - Patient normally 2-3L ON and PRN w/ exertion. Recently, requiring O2  at rest during the day. Has been requiring continuous oxygen  since arrival.  Supplemental O2 to maintain SPO2 ? 92%, continuous pulse ox. Wean as tolerated. Pulm c/s.  -Continue nebulizer treatments and IV Solu-Medrol     # R forearm cellulitis, fairly extensive. Large area of still attached but sloughed skin which has expressible purulent drainage. May require debridement. ABX as above. PRN Percocet. WC RN c/s. No OM on plain film. # Syncope and collapse. - 3-4 d ago. Says 2/2 coughing fit and he couldn't breathe. Tele. Chk orthostatics an echo. Cards c/s. # MICHAEL, Cr 1.8. Last was 1.1 in 7/22. IVF and repeat     # DM2, hold oral Rx, chk A1c, add Mod SSI q4h, reduced home Lantus dose     # Elevated CK, 486. IVF and repeats q6h. # Hypo Mg, 1.2.  4g MgSO4 ordered. # Tobacco Abuse. He quit when he fell 4 days ago. Counseled continued cessation, 2 mg nicotine gum     #Admission for anemia and GI bleed in 2022  #History of cirrhosis     DVT Prophylaxis:   ADULT DIET;  Regular  Code Status: Full Code          Joella Goltz, MD   3/1/2023 4:58 PM

## 2023-03-01 NOTE — FLOWSHEET NOTE
03/01/23 0956   Vital Signs   Temp 97.7 °F (36.5 °C)   Temp Source Oral   Heart Rate 87   Heart Rate Source Monitor   Resp 16   BP (!) 110/56   MAP (Calculated) 74   BP Location Right upper arm   BP Method Automatic   Patient Position Semi fowbonny   Pain Assessment   Pain Assessment 0-10   Pain Level 6   Pain Location Arm   Pain Orientation Right   Opioid-Induced Sedation   POSS Score 1   Oxygen Therapy   SpO2 96 %   O2 Device Nasal cannula   O2 Flow Rate (L/min) 3 L/min   Assessment complete and morning medications administered

## 2023-03-01 NOTE — ACP (ADVANCE CARE PLANNING)
Advance Care Planning     General Advance Care Planning (ACP) Conversation    Date of Conversation: 2/28/2023  Conducted with: Patient with Decision Making Capacity    Healthcare Decision Maker:    Primary Decision Maker: Keily Calixto - Brother/Sister - [phone]  Click here to complete Healthcare Decision Makers including selection of the Healthcare Decision Maker Relationship (ie \"Primary\"). Today we documented desired Decision Maker(s), who is (are) NOT Legal Next of Kin. ACP documents are required for decision maker authority. Pt stated his daughter Tavon is his legal next of kin. He denied wanting to complete POA papers at this time to designate his brother. He also denied to add his daughter to the contact list. He stated that if something happened, his brother knows how to reach his daughter.      Consult spiritual care was already placed in epic by another provider     Content/Action Overview:  Has NO ACP documents/care preferences - requested patient complete ACP documents  Reviewed DNR/DNI and patient elects Full Code (Attempt Resuscitation)  ventilation preferences  Pt stated he would want a vent if medically necessary     Length of Voluntary ACP Conversation in minutes:  <16 minutes (Non-Billable)    MARK Choi  Case Management Department  Phone: [phone] Fax: [phone]

## 2023-03-01 NOTE — ED NOTES
2100- PS was sent to Dr. Mamie Smith for Dr. Yury Gage for admission     [facility]  02/28/23 9072

## 2023-03-02 PROBLEM — L03.90 SEPSIS DUE TO CELLULITIS (HCC): Status: ACTIVE | Noted: 2023-03-02

## 2023-03-02 PROBLEM — R55 SYNCOPE AND COLLAPSE: Status: ACTIVE | Noted: 2023-03-02

## 2023-03-02 PROBLEM — A41.9 SEPSIS DUE TO CELLULITIS (HCC): Status: ACTIVE | Noted: 2023-03-02

## 2023-03-02 PROBLEM — J44.1 COPD EXACERBATION (HCC): Status: ACTIVE | Noted: 2023-03-02

## 2023-03-02 PROBLEM — E78.00 HYPERCHOLESTEROLEMIA: Status: ACTIVE | Noted: 2023-03-02

## 2023-03-02 LAB
ANION GAP SERPL CALCULATED.3IONS-SCNC: 8 MMOL/L (ref 3–16)
BUN BLDV-MCNC: 19 MG/DL (ref 7–20)
CALCIUM SERPL-MCNC: 8.8 MG/DL (ref 8.3–10.6)
CHLORIDE BLD-SCNC: 105 MMOL/L (ref 99–110)
CO2: 26 MMOL/L (ref 21–32)
CREAT SERPL-MCNC: 0.7 MG/DL (ref 0.8–1.3)
GFR SERPL CREATININE-BSD FRML MDRD: >60 ML/MIN/{1.73_M2}
GLUCOSE BLD-MCNC: 141 MG/DL (ref 70–99)
GLUCOSE BLD-MCNC: 141 MG/DL (ref 70–99)
GLUCOSE BLD-MCNC: 143 MG/DL (ref 70–99)
GLUCOSE BLD-MCNC: 148 MG/DL (ref 70–99)
GLUCOSE BLD-MCNC: 163 MG/DL (ref 70–99)
PERFORMED ON: ABNORMAL
POTASSIUM REFLEX MAGNESIUM: 4 MMOL/L (ref 3.5–5.1)
SODIUM BLD-SCNC: 139 MMOL/L (ref 136–145)
TOTAL CK: 137 U/L (ref 39–308)
TOTAL CK: 156 U/L (ref 39–308)

## 2023-03-02 PROCEDURE — 94640 AIRWAY INHALATION TREATMENT: CPT

## 2023-03-02 PROCEDURE — 97530 THERAPEUTIC ACTIVITIES: CPT

## 2023-03-02 PROCEDURE — 97166 OT EVAL MOD COMPLEX 45 MIN: CPT

## 2023-03-02 PROCEDURE — 2060000000 HC ICU INTERMEDIATE R&B

## 2023-03-02 PROCEDURE — 36415 COLL VENOUS BLD VENIPUNCTURE: CPT

## 2023-03-02 PROCEDURE — 6370000000 HC RX 637 (ALT 250 FOR IP): Performed by: INTERNAL MEDICINE

## 2023-03-02 PROCEDURE — 97535 SELF CARE MNGMENT TRAINING: CPT

## 2023-03-02 PROCEDURE — 99233 SBSQ HOSP IP/OBS HIGH 50: CPT | Performed by: INTERNAL MEDICINE

## 2023-03-02 PROCEDURE — 94761 N-INVAS EAR/PLS OXIMETRY MLT: CPT

## 2023-03-02 PROCEDURE — 82550 ASSAY OF CK (CPK): CPT

## 2023-03-02 PROCEDURE — 97116 GAIT TRAINING THERAPY: CPT

## 2023-03-02 PROCEDURE — 99232 SBSQ HOSP IP/OBS MODERATE 35: CPT | Performed by: INTERNAL MEDICINE

## 2023-03-02 PROCEDURE — 97161 PT EVAL LOW COMPLEX 20 MIN: CPT

## 2023-03-02 PROCEDURE — 80048 BASIC METABOLIC PNL TOTAL CA: CPT

## 2023-03-02 PROCEDURE — 2700000000 HC OXYGEN THERAPY PER DAY

## 2023-03-02 PROCEDURE — 2580000003 HC RX 258: Performed by: INTERNAL MEDICINE

## 2023-03-02 PROCEDURE — 6360000002 HC RX W HCPCS: Performed by: INTERNAL MEDICINE

## 2023-03-02 RX ORDER — ALBUTEROL SULFATE 2.5 MG/3ML
2.5 SOLUTION RESPIRATORY (INHALATION) EVERY 4 HOURS PRN
Status: DISCONTINUED | OUTPATIENT
Start: 2023-03-02 | End: 2023-03-03 | Stop reason: HOSPADM

## 2023-03-02 RX ADMIN — IPRATROPIUM BROMIDE AND ALBUTEROL SULFATE 1 AMPULE: .5; 2.5 SOLUTION RESPIRATORY (INHALATION) at 07:53

## 2023-03-02 RX ADMIN — IPRATROPIUM BROMIDE AND ALBUTEROL SULFATE 1 AMPULE: .5; 2.5 SOLUTION RESPIRATORY (INHALATION) at 19:37

## 2023-03-02 RX ADMIN — CEFEPIME 2000 MG: 2 INJECTION, POWDER, FOR SOLUTION INTRAVENOUS at 17:16

## 2023-03-02 RX ADMIN — IPRATROPIUM BROMIDE AND ALBUTEROL SULFATE 1 AMPULE: .5; 2.5 SOLUTION RESPIRATORY (INHALATION) at 15:23

## 2023-03-02 RX ADMIN — MULTIPLE VITAMINS W/ MINERALS TAB 1 TABLET: TAB at 09:31

## 2023-03-02 RX ADMIN — METOPROLOL SUCCINATE 50 MG: 50 TABLET, EXTENDED RELEASE ORAL at 09:31

## 2023-03-02 RX ADMIN — DULOXETINE HYDROCHLORIDE 60 MG: 60 CAPSULE, DELAYED RELEASE ORAL at 09:31

## 2023-03-02 RX ADMIN — ATORVASTATIN CALCIUM 40 MG: 40 TABLET, FILM COATED ORAL at 09:31

## 2023-03-02 RX ADMIN — Medication 100 MG: at 09:31

## 2023-03-02 RX ADMIN — METHYLPREDNISOLONE SODIUM SUCCINATE 40 MG: 40 INJECTION, POWDER, FOR SOLUTION INTRAMUSCULAR; INTRAVENOUS at 20:32

## 2023-03-02 RX ADMIN — PANTOPRAZOLE SODIUM 40 MG: 40 TABLET, DELAYED RELEASE ORAL at 09:31

## 2023-03-02 RX ADMIN — FERROUS SULFATE TAB 325 MG (65 MG ELEMENTAL FE) 325 MG: 325 (65 FE) TAB at 09:31

## 2023-03-02 RX ADMIN — METHYLPREDNISOLONE SODIUM SUCCINATE 40 MG: 40 INJECTION, POWDER, FOR SOLUTION INTRAMUSCULAR; INTRAVENOUS at 09:31

## 2023-03-02 RX ADMIN — INSULIN GLARGINE 6 UNITS: 100 INJECTION, SOLUTION SUBCUTANEOUS at 20:34

## 2023-03-02 RX ADMIN — SODIUM CHLORIDE 25 ML: 9 INJECTION, SOLUTION INTRAVENOUS at 17:19

## 2023-03-02 RX ADMIN — VANCOMYCIN HYDROCHLORIDE 500 MG: 500 INJECTION, POWDER, LYOPHILIZED, FOR SOLUTION INTRAVENOUS at 05:43

## 2023-03-02 RX ADMIN — ENOXAPARIN SODIUM 40 MG: 100 INJECTION SUBCUTANEOUS at 09:32

## 2023-03-02 RX ADMIN — FOLIC ACID 1000 MCG: 1 TABLET ORAL at 09:31

## 2023-03-02 RX ADMIN — Medication 1000 UNITS: at 09:31

## 2023-03-02 RX ADMIN — Medication 10 ML: at 09:32

## 2023-03-02 RX ADMIN — IPRATROPIUM BROMIDE AND ALBUTEROL SULFATE 1 AMPULE: .5; 2.5 SOLUTION RESPIRATORY (INHALATION) at 11:47

## 2023-03-02 RX ADMIN — CEFEPIME 2000 MG: 2 INJECTION, POWDER, FOR SOLUTION INTRAVENOUS at 06:02

## 2023-03-02 RX ADMIN — ASPIRIN 81 MG: 81 TABLET, COATED ORAL at 09:31

## 2023-03-02 RX ADMIN — VANCOMYCIN HYDROCHLORIDE 500 MG: 500 INJECTION, POWDER, LYOPHILIZED, FOR SOLUTION INTRAVENOUS at 17:25

## 2023-03-02 RX ADMIN — Medication 3 MG: at 20:32

## 2023-03-02 RX ADMIN — DILTIAZEM HYDROCHLORIDE 180 MG: 180 CAPSULE, COATED, EXTENDED RELEASE ORAL at 09:31

## 2023-03-02 NOTE — PROGRESS NOTES
Inpatient Occupational Therapy Evaluation and Treatment    Unit: [facility]  Date:  3/2/2023  Patient Name:    Derick Hernandes  Admitting diagnosis:  Syncope and collapse [R55]  COPD exacerbation ([facility] [J44.1]  MICHAEL (acute kidney injury) ([facility] [N17.9]  Cellulitis of right upper extremity [L03.113]  Sepsis ([facility] [A41.9]  Sepsis due to cellulitis ([facility] [L03.90, A41.9]  Admit Date:  2/28/2023  Precautions/Restrictions/WB Status/ Lines/ Wounds/ Oxygen: Fall risk, Bed/chair alarm, Lines (IV and Supplemental O2 (2L)), Telemetry, and Continuous pulse oximetry    Treatment Time:  825-915   Treatment Number:  1  Timed Code Treatment Minutes: 40 minutes  Total Treatment Minutes:  50  minutes    Patient Goals for Therapy: \" go home  \"          Discharge Recommendations: Home with PRN assist  DME needs for discharge: Shower Chair         Therapy recommendations for staff:   Stand by assist for ambulation with use of rolling walker (RW) to/from bathroom with gait belt     History of Present Illness: per H&P   78 y.o. male with PMH below, presents with SOB/FALCON, increased O2 demand, R forearm redness, pain and swelling, recent fall/syncope, productive cough, intermittent CP. Pt reports that he had a coughing fit 4-5 d ago that made him pass out and fall. He says he hit his R forearm on a fan. He says he had a similar episode of syncope several months ago. He had a skin tear to his R forearm. He says the next day he began having redness around to wound which increased to involve nearly all of his R forearm since. He also says within a day or 2 he developed a large white area centrally which has not changed since. His forearm has become increasingly painful. Pain is sharp, constant, severe. Worse w/ palpation and use of his arm. He also notes that for the last week or so he has had increasingly SOB/FALCON w/ increasingly productive cough. He says he normally wears 2-3 L O2 ON and PRN w/ exertions.   He says he rarely requires it during the day. However, the last few days he has been requiring during the day and at rest.  He also notes that he has had some intermittent L CP over the last week. He has had exac w/ cough and when he is feeling particularly SOB. None currently. He still has some pain in his R neck since the fall  Home Health S4 Level Recommendation:  Level 1 Standard    AM-PAC Score: 23    Subjective:  Patient lying supine in bed with no family present. Pt agreeable to this OT session. Cognition:    A&O x4   Able to follow 2 step commands    Pain:   Yes  Location: Rt UE   Ratin /10  Pain Medicine Status: No request made    Preadmission Environment:   Pt. Lives     Alone- brother lives close by   Home environment:    one story home and mobile home/trailer  Steps to enter first floor:   3+1  steps to enter with bilateral railings  Steps to second floor/basement: N/A  Laundry:     Goes to [employer] with brother   Bathroom:     Tub shower   Pt sleeps in a:    Flat bed  Equipment owned:    RW, SPC, raised toilet seat, and home O2 (3L) PRN, pulse ox , in halers     Preadmission Status:  Pt. Able to drive:    Yes   Pt. Fully independent with ADLs:  Yes  Pt. Required assistance for: Independent PTA- niece does come over and assists with cleaning   Pt. independent for functional transfers and utilized No Device for mobility in home and No Device out in community  History of falls:    Yes  Home Health Services:  None    Objective:  Does this pt have an acute or acute on chronic diagnosis of CHF?  No    Upper Extremity ROM:    Appears WFL's    Upper Extremity Strength:    WFL    Upper Extremity Sensation:    WFL    Upper Extremity Proprioception:  WFL    Coordination and Tone  WFL    Balance:  Sitting:    Independent  Standing:    Supervision      Bed Mobility:   Supine to Sit:    Independent  Sit to Supine:   Not Tested  Rolling:   Not Tested  Scooting in sitting: Independent  Scooting in supine:  Not Tested    Transfers: Sit to stand:    Independent  Stand to sit: Independent  Bed to chair:     Supervision with gait belt and no AD   Bed/ chair to standard toilet:  Supervision  Bed/chair to [facility]:   Not Tested  Functional Mobility:   Supervision    See PT note for gait analysis. ADLs:  Dressing:      UE:   Not Tested  LE:    Supervision    Bathing:    UE:  Not Tested  LE:  Not Tested    Eating:   Independent    Toileting:  Supervision    Grooming/hygiene: Not Tested    Activity Tolerance:   Pt completed therapy session with no adverse symptoms     BP (mmHg) HR (bpm) SpO2 (%) on 02 2 liter Comments   Supine at rest  84 96    Seated at EOB  91 85    After ambulation to bathroom    -92%    End of session         Positioning Needs:   Pt up in chair, alarm set, call light provided and all needs within reach . Ther Ex / Activities Initiated:   N/A    Patient/Family Education:   Pt educated on role of inpatient OT, plan of care, importance of continued activity, safety awareness, energy conservation, and calling for assist with mobility. Assessment:    Pt seen for Occupational therapy evaluation in acute care setting. Pt demonstrated decreased Activity tolerance, ADLs, Balance , and Transfers. Pt functioning below baseline and will likely benefit from skilled occupational therapy services to maximize safety and independence. Goal(s) : To be met in 3 Visits:  Bed to toilet/BSC:       Independent  Pt will complete 3/3 CHF goals     N/A    To be met in 5 Visits:  Supine to/from Sit in preparation for ADL task: Independent  Toileting        Independent  Grooming       Independent  Upper Body Dressing:      Independent  Lower Body Dressing:      Independent  Pt to demonstrate UE therapeutic exs x 15 reps with minimal cues    Rehabilitation Potential: Good  Strengths for achieving goals include: Pt cooperative   Barriers to achieving goals include:  Complexity of condition    Plan:   To be seen 3-5 x/wk while in acute care setting for therapeutic exercises, bed mobility, transfers, family/patient education, ADL/IADL retraining, and energy conservation training.     Electronically signed by Red Lew OT on 3/2/2023 at 7:42 AM      If patient discharges from this facility prior to next visit, this note will serve as the Discharge Summary

## 2023-03-02 NOTE — PROGRESS NOTES
Vancomycin Day: 3  Current Regimen: 500 mg IV every 12 hours    Patient's labs, cultures, vitals, and vancomycin regimen reviewed. No changes today.

## 2023-03-02 NOTE — PROGRESS NOTES
Patient resting, eyes closed, respirations witnessed as e/e. No signs of distress. IVF infusing. Bed alarm in place. Call light and bedside table is easy reach.

## 2023-03-02 NOTE — PROGRESS NOTES
Inpatient Physical Therapy Evaluation    Unit: [address] [facility]  Date:  3/2/2023  Patient Name:    Trell Almaraz  Admitting diagnosis:  Syncope and collapse [R55]  COPD exacerbation ([facility] [J44.1]  MICHAEL (acute kidney injury) ([facility] [N17.9]  Cellulitis of right upper extremity [L03.113]  Sepsis ([facility] [A41.9]  Sepsis due to cellulitis ([facility] [L03.90, A41.9]  Admit Date:  2/28/2023  Precautions/Restrictions/WB Status/ Lines/ Wounds/ Oxygen: Fall risk, Bed/chair alarm, Lines (IV and Supplemental O2 (2L)), Telemetry, and Continuous pulse oximetry    Treatment Time:  138-591  Treatment Number:  1  Timed Code Treatment Minutes: 40 minutes  Total Treatment Minutes:  50  minutes    Patient Stated Goals for Therapy: \" to get back home \"          Discharge Recommendations: Home PT  DME needs for discharge:  pt owns RW and pulse ox       Therapy recommendation for EMS Transport: can transport by wheelchair    Therapy recommendations for staff:   Assist of 1 for ambulation with use of No AD to/from bathroom    History of Present Illness:   per H&P   78 y.o. male with PMH below, presents with SOB/FALCON, increased O2 demand, R forearm redness, pain and swelling, recent fall/syncope, productive cough, intermittent CP. Pt reports that he had a coughing fit 4-5 d ago that made him pass out and fall. He says he hit his R forearm on a fan. He says he had a similar episode of syncope several months ago. He had a skin tear to his R forearm. He says the next day he began having redness around to wound which increased to involve nearly all of his R forearm since. He also says within a day or 2 he developed a large white area centrally which has not changed since. His forearm has become increasingly painful. Pain is sharp, constant, severe. Worse w/ palpation and use of his arm. He also notes that for the last week or so he has had increasingly SOB/FALCON w/ increasingly productive cough.   He says he normally wears 2-3 L O2 ON and PRN w/ exertions. He says he rarely requires it during the day. However, the last few days he has been requiring during the day and at rest.  He also notes that he has had some intermittent L CP over the last week. He has had exac w/ cough and when he is feeling particularly SOB. None currently. He still has some pain in his R neck since the fall    Home Health S4 Level Recommendation:  Level 1 Standard    AM-PAC Mobility Score    AM-PAC Inpatient Mobility Raw Score : 17       Subjective  Patient lying reclined bed with no family present. Pt agreeable to this PT session. Cognition    A&O x4   Able to follow 2 step commands    Pain   Yes  Location: arm  Ratin /10  Pain Medicine Status: RN notified    Preadmission Environment:   Pt. Lives                                              Alone- brother lives close by   Home environment:                            one story home and mobile home/trailer  Steps to enter first floor:                     3+1  steps to enter with bilateral railings  Steps to second floor/basement:        N/A  Laundry:                                              Goes to [employer] with brother   Bathroom:                                           Tub shower   Pt sleeps in a:                                     Flat bed  Equipment owned:                              , [facility], raised toilet seat, and home O2 (3L) PRN, pulse ox      Preadmission Status:  Pt. Able to drive:                                 Yes   Pt. Fully independent with ADLs:       Yes  Pt. Required assistance for: Independent PTA- niece does come over and assists with cleaning   Pt. independent for functional transfers and utilized No Device for mobility in home and No Device out in community  History of falls:                                                Yes  Home Health Services:                       None    Objective  Does this pt have an acute or acute on chronic diagnosis of CHF?  No    Upper Extremity ROM/Strength  Please see OT evaluation. Lower Extremity ROM / Strength   AROM WFL: Yes  ROM limitations: none    BLE strength WFL, but not formally assessed with MMT. Lower Extremity Sensation    [facility]      Balance  Static Sitting:  Good ; Supervision  Dynamic Sitting:  Good ; SBA  Comments: sat EOB ~8 minutes    Static Standing: Fair ; SBA  Dynamic Standing: Fair ; CGA}  Comments: Pt stands and ambulates without AD and no LOB    Posture  Seated: WNL  Standing:  Forward head and neck    Bed Mobility   Supine to Sit:    Supervision  Sit to Supine:   Not Tested  Rolling:   Not Tested  Scooting in sitting: Supervision  Scooting in supine:  Not Tested  Bridging:  Not Tested    Transfer Training     Sit to stand:   CGA  Stand to sit:   CGA  Bed to Chair:   CGA with use of No AD- VC for O2 line management    Gait gait completed as indicated below  Distance:      20 ft x 2 (to and from restroom)  Deviations (firm surface/linoleum):  decreased edenilson, increased LUPE, deviated path, and decreased step length bilaterally  Assistive Device Used:    No AD  Level of Assist:    CGA  Comment: VC for O2 online management throughout ambulation    Stair Training deferred, pt unsafe/ not appropriate to complete stairs at this time  # of Steps:   N/A  Level of Assist:  Not Tested  UE Support:  NA  Assistive Device:  N/A  Pattern:   N/A  Comments: note tested this date     Therapeutic Exercises Initiated  deferred secondary to treatment focus on functional mobility  Supine:  N/A    Seated:  N/A    Standing:  N/A    Activity Tolerance   During therapy session noted pt with SOB/FALCON  desaturation of O2  see vitals chart below for details    Pt Position BP (mmHg) HR (bpm) SpO2 (%) on 2  Comments   Supine at rest  84 96    Seated at EOB  91 85 Recovered with pursed lip breathing in ~1 minute   Standing   92 After ambulating to restroom   End of session         Positioning Needs   Pt up in chair, alarm set, positioned in proper neutral alignment and pressure relief provided. Call light provided and all needs within reach  RN aware of pt position/status    Other Activities  None. Patient/Family Education   Pt educated on role of inpatient PT, POC, importance of continued activity, DC recommendations, safety awareness, transfer techniques, pursed lip breathing, and calling for assist with mobility. Assessment  Pt seen today for physical therapy Evaluation. Pt demonstrated decreased Activity tolerance, Balance, Safety, and Strength as well as decreased independence with Ambulation. Pt currently requires CGA for all functional mobility due to O2 desaturation and O2 line management. Pt educated on need for O2 throughout all mobility due to drop in O2. Pt verbalized understanding. Pt slightly unsteady on feet but no LOB this date. Pt would continue to benefit from skilled PT services throughout inpatient stay to promote above deficits in addition to continued therapy upon discharge. Recommending Home PRN assist and with home PT upon discharge as patient functioning would benefit from continued therapy services    Goals : To be met in 3 visits:  1). Independent with LE Ex x 10 reps  2). Sit to/from stand: Independent  3). Bed to chair: Independent  4). CHF goal: N/A    To be met in 6 visits:  1). Gait: Ambulate 150 ft.  with Independent and use of LRAD (least restrictive assistive device)  2). Tolerate B LE exercises 3 sets of 10-15 reps  3). Ascend/descend 3+ 1 steps with Independent with use of hand rail bilateral and No AD and LRAD (least restrictive assistive device)    Rehabilitation Potential: Fair  Strengths for achieving goals include:   Pt motivated, PLOF, and Pt cooperative   Barriers to achieving goals include:    Pain    Plan    To be seen 3-5 x / week  while in acute care setting for therapeutic exercises, bed mobility, transfers, progressive gait training, balance training, and family/patient education.     Signature: Harman Gastelum, MARIELLE     If patient discharges from this facility prior to next visit, this note will serve as the Discharge Summary.

## 2023-03-02 NOTE — FLOWSHEET NOTE
03/02/23 0926   Vital Signs   Temp 97.1 °F (36.2 °C)   Temp Source Oral   Heart Rate 82   Heart Rate Source Monitor   Resp 16   /68   MAP (Calculated) 86   BP Location Left upper arm   BP Method Automatic   Patient Position Up in chair   Pain Assessment   Pain Assessment None - Denies Pain   Opioid-Induced Sedation   POSS Score 1   Oxygen Therapy   SpO2 96 %   O2 Device Nasal cannula   O2 Flow Rate (L/min) 2 L/min   Assessment complete and morning medications administered

## 2023-03-02 NOTE — PROGRESS NOTES
RT Inhaler-Nebulizer Bronchodilator Protocol Note    There is a bronchodilator order in the chart from a provider indicating to follow the RT Bronchodilator Protocol and there is an Initiate RT Inhaler-Nebulizer Bronchodilator Protocol order as well (see protocol at bottom of note). CXR Findings:  XR CHEST PORTABLE    Result Date: 2/28/2023  No acute cardiopulmonary findings       The findings from the last RT Protocol Assessment were as follows:   History Pulmonary Disease: Chronic pulmonary disease  Respiratory Pattern: Dyspnea on exertion or RR 21-25 bpm  Breath Sounds: Slightly diminished and/or crackles  Cough: Strong, spontaneous, non-productive  Indication for Bronchodilator Therapy: Decreased or absent breath sounds  Bronchodilator Assessment Score: 6    Aerosolized bronchodilator medication orders have been revised according to the RT Inhaler-Nebulizer Bronchodilator Protocol below. Respiratory Therapist to perform RT Therapy Protocol Assessment initially then follow the protocol. Repeat RT Therapy Protocol Assessment PRN for score 0-3 or on second treatment, BID, and PRN for scores above 3. No Indications - adjust the frequency to every 6 hours PRN wheezing or bronchospasm, if no treatments needed after 48 hours then discontinue using Per Protocol order mode. If indication present, adjust the RT bronchodilator orders based on the Bronchodilator Assessment Score as indicated below. Use Inhaler orders unless patient has one or more of the following: on home nebulizer, not able to hold breath for 10 seconds, is not alert and oriented, cannot activate and use MDI correctly, or respiratory rate 25 breaths per minute or more, then use the equivalent nebulizer order(s) with same Frequency and PRN reasons based on the score. If a patient is on this medication at home then do not decrease Frequency below that used at home.     0-3 - enter or revise RT bronchodilator order(s) to equivalent RT Bronchodilator order with Frequency of every 4 hours PRN for wheezing or increased work of breathing using Per Protocol order mode.        4-6 - enter or revise RT Bronchodilator order(s) to two equivalent RT bronchodilator orders with one order with BID Frequency and one order with Frequency of every 4 hours PRN wheezing or increased work of breathing using Per Protocol order mode.        7-10 - enter or revise RT Bronchodilator order(s) to two equivalent RT bronchodilator orders with one order with TID Frequency and one order with Frequency of every 4 hours PRN wheezing or increased work of breathing using Per Protocol order mode.       11-13 - enter or revise RT Bronchodilator order(s) to one equivalent RT bronchodilator order with QID Frequency and an Albuterol order with Frequency of every 4 hours PRN wheezing or increased work of breathing using Per Protocol order mode.      Greater than 13 - enter or revise RT Bronchodilator order(s) to one equivalent RT bronchodilator order with every 4 hours Frequency and an Albuterol order with Frequency of every 2 hours PRN wheezing or increased work of breathing using Per Protocol order mode.     PATIENT WILL BENEFIT FROM TREATMENTS.     Electronically signed by Padmini Rojas RCP on 3/1/2023 at 7:46 PM

## 2023-03-02 NOTE — PLAN OF CARE
Problem: Discharge Planning  Goal: Discharge to home or other facility with appropriate resources  Outcome: Progressing     Problem: Pain  Goal: Verbalizes/displays adequate comfort level or baseline comfort level  Outcome: Progressing     Problem: Safety - Adult  Goal: Free from fall injury  Outcome: Progressing     Problem: Neurosensory - Adult  Goal: Achieves stable or improved neurological status  Outcome: Progressing     Problem: Respiratory - Adult  Goal: Achieves optimal ventilation and oxygenation  Outcome: Progressing     Problem: Skin/Tissue Integrity - Adult  Goal: Skin integrity remains intact  Outcome: Progressing     Problem: Cardiovascular - Adult  Goal: Maintains optimal cardiac output and hemodynamic stability  Outcome: Progressing

## 2023-03-02 NOTE — FLOWSHEET NOTE
Shift assessment complete. See doc flow. Nightly medications given see MAR. A/O x4. IVF infusing. Hx of COPD. Patient O2 sats 96% on 3L via nc. Patient states he only wears his home O2 at bedtime when at home. Dressing in place to [address] [employer]. Patient reports trouble sleeping at night PRN Melatonin given. Urinal at the bedside. Bed alarm in place. Call light and bedside table within easy reach.     03/01/23 2156   Vital Signs   Temp 97.9 °F (36.6 °C)   Temp Source Oral   Heart Rate 83   Heart Rate Source Monitor   Resp 16   BP (!) 116/58   MAP (Calculated) 77   BP Location Right upper arm   BP Method Automatic   Patient Position Semi fowlers   Level of Consciousness 0   MEWS Score 1   Oxygen Therapy   SpO2 96 %   O2 Device Nasal cannula   O2 Flow Rate (L/min) 3 L/min

## 2023-03-02 NOTE — PROGRESS NOTES
RT Inhaler-Nebulizer Bronchodilator Protocol Note    There is a bronchodilator order in the chart from a provider indicating to follow the RT Bronchodilator Protocol and there is an Initiate RT Inhaler-Nebulizer Bronchodilator Protocol order as well (see protocol at bottom of note). CXR Findings:  XR CHEST PORTABLE    Result Date: 2/28/2023  No acute cardiopulmonary findings       The findings from the last RT Protocol Assessment were as follows:   History Pulmonary Disease: (P) Chronic pulmonary disease  Respiratory Pattern: (P) Dyspnea on exertion or RR 21-25 bpm  Breath Sounds: (P) Inspiratory and expiratory or bilateral wheezing and/or rhonchi  Cough: (P) Strong, productive  Indication for Bronchodilator Therapy: (P) Wheezing associated with pulm disorder  Bronchodilator Assessment Score: (P) 11    Aerosolized bronchodilator medication orders have been revised according to the RT Inhaler-Nebulizer Bronchodilator Protocol below. Respiratory Therapist to perform RT Therapy Protocol Assessment initially then follow the protocol. Repeat RT Therapy Protocol Assessment PRN for score 0-3 or on second treatment, BID, and PRN for scores above 3. No Indications - adjust the frequency to every 6 hours PRN wheezing or bronchospasm, if no treatments needed after 48 hours then discontinue using Per Protocol order mode. If indication present, adjust the RT bronchodilator orders based on the Bronchodilator Assessment Score as indicated below. Use Inhaler orders unless patient has one or more of the following: on home nebulizer, not able to hold breath for 10 seconds, is not alert and oriented, cannot activate and use MDI correctly, or respiratory rate 25 breaths per minute or more, then use the equivalent nebulizer order(s) with same Frequency and PRN reasons based on the score. If a patient is on this medication at home then do not decrease Frequency below that used at home.     0-3 - enter or revise RT bronchodilator order(s) to equivalent RT Bronchodilator order with Frequency of every 4 hours PRN for wheezing or increased work of breathing using Per Protocol order mode.        4-6 - enter or revise RT Bronchodilator order(s) to two equivalent RT bronchodilator orders with one order with BID Frequency and one order with Frequency of every 4 hours PRN wheezing or increased work of breathing using Per Protocol order mode.        7-10 - enter or revise RT Bronchodilator order(s) to two equivalent RT bronchodilator orders with one order with TID Frequency and one order with Frequency of every 4 hours PRN wheezing or increased work of breathing using Per Protocol order mode.       11-13 - enter or revise RT Bronchodilator order(s) to one equivalent RT bronchodilator order with QID Frequency and an Albuterol order with Frequency of every 4 hours PRN wheezing or increased work of breathing using Per Protocol order mode.      Greater than 13 - enter or revise RT Bronchodilator order(s) to one equivalent RT bronchodilator order with every 4 hours Frequency and an Albuterol order with Frequency of every 2 hours PRN wheezing or increased work of breathing using Per Protocol order mode.         Electronically signed by Maicol Daley RCP on 3/2/2023 at 7:56 AM

## 2023-03-02 NOTE — PROGRESS NOTES
IM Progress Note    Admit Date:  2/28/2023    Admitted with sepsis, right arm cellulitis  COPD    Subjective:  Mr. Kory Ponce feels much better today . Cough and shortness of breath have improved  . Oxygen saturations  stable on 3 L.-This is his home oxygen setting. No fevers    Objective:   /68   Pulse 96   Temp 97.1 °F (36.2 °C) (Oral)   Resp 18   Ht 5' 7.75\" (1.721 m)   Wt 205 lb 8 oz (93.2 kg)   SpO2 97%   BMI 31.48 kg/m²     Intake/Output Summary (Last 24 hours) at 3/2/2023 1444  Last data filed at 3/2/2023 0839  Gross per 24 hour   Intake 540 ml   Output --   Net 540 ml         Physical Exam:  General: Patient appears chronically ill. awake, alert, NAD  Skin:  Warm and dry  Neck:  JVD absent. Neck supple  Chest: Diminished breath sounds, mild rhonchi . Cardiovascular:  RRR ,S1S2 normal  Abdomen:  Soft, non tender, non distended, BS +  Extremities:  No edema. Right arm decreased erythema  Intact peripheral pulses.  Brisk cap refill, < 2 secs  Neuro: non focal      Medications:   Scheduled Meds:   insulin glargine  6 Units SubCUTAneous Nightly    folic acid  4,967 mcg Oral Daily    ferrous sulfate  325 mg Oral Daily with breakfast    DULoxetine  60 mg Oral Daily    dilTIAZem  180 mg Oral Daily    atorvastatin  40 mg Oral Daily    metoprolol succinate  50 mg Oral Daily    pantoprazole  40 mg Oral Daily    thiamine  100 mg Oral Daily    multivitamin  1 tablet Oral Daily    Vitamin D  1,000 Units Oral Daily    sodium chloride flush  5-40 mL IntraVENous 2 times per day    enoxaparin  40 mg SubCUTAneous Daily    methylPREDNISolone  40 mg IntraVENous Q12H    Followed by    Aisha Long ON 3/3/2023] predniSONE  40 mg Oral Daily    insulin lispro  0-8 Units SubCUTAneous Q4H    cefepime  2,000 mg IntraVENous Q12H    vancomycin  500 mg IntraVENous Q12H    aspirin  81 mg Oral Daily    ipratropium-albuterol  1 ampule Inhalation 4x daily       Continuous Infusions:   dextrose      sodium chloride 25 mL (03/01/23 1725)       Data:  CBC:   Recent Labs     02/28/23  1509 03/01/23  0803   WBC 29.5* 28.0*   RBC 4.46 4.36   HGB 14.0 13.4*   HCT 42.6 41.9   MCV 95.5 96.2   RDW 14.8 15.2    231       BMP:   Recent Labs     02/28/23  1509 03/01/23  0803 03/02/23  0803    136 139   K 3.5 3.7 4.0   CL 96* 101 105   CO2 29 25 26   BUN 25* 26* 19   CREATININE 1.8* 1.1 0.7*       BNP: No results for input(s): BNP in the last 72 hours. PT/INR:   Recent Labs     02/28/23  1509   PROTIME 15.5*   INR 1.25*       APTT: No results for input(s): APTT in the last 72 hours. CARDIAC ENZYMES:   Recent Labs     02/28/23  1509   TROPONINI <0.01       FASTING LIPID PANEL:  Lab Results   Component Value Date    CHOL 154 07/11/2022    HDL 46 07/11/2022    TRIG 111 07/11/2022     LIVER PROFILE:   Recent Labs     02/28/23  1509 03/01/23  0803   AST 30 26   ALT 15 14   BILITOT 0.6 0.4   ALKPHOS 107 89            Cultures  COVID/Influenza: not detected   Blood cultures positive for strep  Wound cultures positive for strep    Radiology  CT ABDOMEN PELVIS W IV CONTRAST Additional Contrast? None   Final Result   1. No focal abnormality in the right lower quadrant. 2. Early findings for cirrhosis. CT CHEST PULMONARY EMBOLISM W CONTRAST   Final Result   No evidence of pulmonary embolism or acute pulmonary abnormality. CT LUMBAR SPINE TRAUMA RECONSTRUCTION   Final Result   Unremarkable non-contrast CT of the lumbar spine. CT CERVICAL SPINE WO CONTRAST   Final Result   No acute abnormality of the cervical spine. CT HEAD WO CONTRAST   Final Result   No acute intracranial abnormality. XR RADIUS ULNA RIGHT (2 VIEWS)   Final Result   No evidence of osteomyelitis. XR CHEST PORTABLE   Final Result   No acute cardiopulmonary findings           echo   Summary   Left ventricular systolic function is normal with ejection fraction   estimated at 55-60 %. No regional wall motion abnormalities are noted. There is moderate concentric left ventricular hypertrophy. Normal left ventricular diastolic filling pressure. The right atrium is mildly dilated. Moderate posterior mitral annular calcification is present. Aortic sclerosis no significant aortic stenosis. Trivial tricuspid regurgitation. Unable to obtain SPAP due to lack of tricuspid regurgitation. Epicardiac fat pad is present. 2- 55-60 AV sclerosis, DD1, LVH      Assessment:  Principal Problem:    Sepsis ([address]  Active Problems:    Cellulitis of right upper extremity    Syncope and collapse    Sepsis due to cellulitis ([address]    COPD exacerbation ([address]    Hypercholesterolemia    PAD (peripheral artery disease) ([address]  Resolved Problems:    * No resolved hospital problems. *      Plan:    # Sepsis (WBC, RR, HR; lact pending, PCT added on). - Likely secondary to right arm cellulitis and pneumonia  -Wound and blood cultures positive for strep . - IV vanco and cefepime. Continue .  -White count 29 K on admission-> 28.5. Monitor CBC    # R forearm cellulitis, fairly extensive. Large area of still attached but sloughed skin which has expressible purulent drainage. May require debridement. ABX as above. PRN Percocet. WC RN c/s. No OM on plain film. Right arm looks better today    #Acute on chronic respiratory failure with hypoxia. #Exacerbation of COPD  -On supplemental oxygen 3 L.  -Seen in consultation by pulmonology  - Patient normally 2-3L ON and PRN w/ exertion. Recently, requiring O2  at rest during the day. Has been requiring continuous oxygen  since arrival.  Supplemental O2 to maintain SPO2 ? 92%, continuous pulse ox. Wean as tolerated. Pulm c/s.  -Continue nebulizer treatments and IV Solu-Medrol    #Pneumonia  -Likely gram-positive infection  -Patient with cough shortness of breath and chills for 5 days  -Continue antibiotics vancomycin and cefepime     # Syncope and collapse. - 3-4 d ago.   Says 2/2 coughing fit and he couldn't breathe.    -Monitor on tele. -Seen by cardiology  -Syncope is consistent with cough syncope. Patient was also dehydrated. - chk orthostatics   - checked echo. Normal EF     # HTn  - BP stable     # MICHAEL, Cr 1.8. Last was 1.1 in 7/22. IVF and repeat   MICHAEL resolved creatinine improved from 1.8-> 1.1-> 0.7. Stop IV fluids  Monitor BMP    # DM2, hold oral Rx, chk A1c, add Mod SSI q4h, reduced home Lantus dose     # Elevated CK,   486. resolved with  IVF . # Hypo Mg, 1.2.  4g MgSO4 ordered. # Tobacco Abuse. He quit when he fell 4 days ago. Counseled continued cessation, 2 mg nicotine gum     #Admission for anemia and GI bleed in 2022    #History of cirrhosis     DVT Prophylaxis:   ADULT DIET;  Regular  Code Status: Full Code     He is improving     Likely discharge home on oral antibiotics tomorrow      Dixie Olivia MD   3/2/2023 2:44 PM

## 2023-03-02 NOTE — PROGRESS NOTES
[facility] Daily Progress Note      Admit Date:  2/28/2023    Subjective:  Mr. Shaquille Talamantes is seen for cardiology follow up. He feels a lot better. On O2 2L/min  Denies chest pain,edema, dizziness, palpitations,  admitted with cough syncope. History of Present Illness:  Eladia Wilkinson is a 78 y.o. patient who presented to the hospital with complaints of shortness of breath, cough chills yellow flegm for 5 days. He says he was coughing so hard and passed out 5 days ago fell on fan. He found himself on ground. He lives alone there was no witness. He had chills but no fever. He injured his rt arm and has oozing ulcer. He had chest pain on coughing 5 days ago but that is almost resolved. He has no heart disease known to him in past.  No prior syncope and no recurrence since last episode. He is a smoker atleast 50 packs+. Heavy alcohol use in past but now one beer a day on average  He has cirrhosis of liver. HBP HLD DM and peripheral neuropathy. H/o Bladder cancer removed. He has not noted any swelling of legs no orthopnea hemoptysis nausea vomiting and diarrhea. H/o vascular surgery not sure if venous or arterial intraabdominal by Dr Juan Francisco Macario. I have been asked to provide consultation regarding further management and testing.      12 point ROS negative in all areas as listed below except as in [place]  Constitutional, EENT GI, , Musculoskeletal, skin, neurological, hematological, endocrine, Psychiatric    Past Medical History:   Diagnosis Date    Bronchitis chronic     Chest pain     Chronic cough     Cirrhosis of liver ([address] 01/01/2017    stage 4     COPD (chronic obstructive pulmonary disease) (HCC)     COPD (chronic obstructive pulmonary disease) ([address]     Diabetes mellitus ([address]     Gout     Hilar adenopathy     Hyperlipidemia     Hypertension     Knee pain, right     Numbness and tingling of leg     Osteoarthritis     Paresthesia of bilateral legs     PVD (peripheral vascular disease) ([address] Seizures ([address]     ongoing, began after swine flu vaccination    Substance abuse [facility])      Past Surgical History:   Procedure Laterality Date    AORTO-ILIAC BYPASS GRAFT N/A 08/24/2020    AORTOBIFEMORAL BYPASS GRAFT performed by Anthony Benedict MD at [facility]  02/07/2011    bronch with EBUS    CATARACT REMOVAL Left     COLONOSCOPY N/A 07/23/2018    COLONOSCOPY WITH BIOPSY performed by Hector Beltran MD at [place] [address] N/A 07/23/2018    COLONOSCOPY POLYPECTOMY SNARE/COLD BIOPSY performed by Hector Beltran MD at [place] [address]  05/10/2022    COLONOSCOPY N/A 05/10/2022    COLON W/ANES. performed by Jacinto Cardenas MD at [facility]  02/04/2015    Urethral Dilatation, Resection of Bladder Tumor    CYSTOSCOPY  02/24/2016    fulgeration of bladder tumor    ELBOW SURGERY Left     ENDOSCOPY, SMALL BOWEL N/A 02/20/2019    BOWEL SMALL CAPSULE ENDOSCOPY performed by Hector Beltran MD at [address] [place] [address]  05/06/2014    cystoscopy, bladder biopsy    OTHER SURGICAL HISTORY  09/09/2015    cystoscopy, urethral dilatation, bladder tumor follow up    UPPER GASTROINTESTINAL ENDOSCOPY N/A 07/23/2018    EGD BIOPSY performed by Hector Beltran MD at [address] [place] [address] 05/09/2022    EGD BIOPSY performed by Jacinto Cardenas MD at [facility]       Objective:   /75   Pulse 92   Temp 97.5 °F (36.4 °C) (Oral)   Resp 18   Ht 5' 7.75\" (1.721 m)   Wt 205 lb 8 oz (93.2 kg)   SpO2 95%   BMI 31.48 kg/m²     Intake/Output Summary (Last 24 hours) at 3/2/2023 0840  Last data filed at 3/2/2023 0036  Gross per 24 hour   Intake 540 ml   Output 450 ml   Net 90 ml       TELEMETRY: Sinus     Physical Exam:  General: No Respiratory distress, appears well developed and well nourished.    Eyes:  Sclera nonicteric  Nose/Sinuses:  negative findings: nose shows no deformity, asymmetry, or inflammation, nasal mucosa normal, septum midline with no perforation or bleeding  Back:  no pain to palpation  Joint:  no active joint inflammation  Musculoskeletal:  negative  Skin:  Warm and dry  Neck:  Negative for JVD and Carotid Bruits. Bilat supraclavicular puffiness but no mass or lymphadenopathy  Chest:  Clear to auscultation, respiration easy  Cardiovascular:  RRR, S1S2 normal, no murmur, no rub or thrill.  Abdomen:  Soft normal liver and spleen  Extremities:   No edema, clubbing, cyanosis,  Neuro: intact    Medications:    insulin glargine  6 Units SubCUTAneous Nightly    folic acid  1,000 mcg Oral Daily    ferrous sulfate  325 mg Oral Daily with breakfast    DULoxetine  60 mg Oral Daily    dilTIAZem  180 mg Oral Daily    atorvastatin  40 mg Oral Daily    metoprolol succinate  50 mg Oral Daily    pantoprazole  40 mg Oral Daily    thiamine  100 mg Oral Daily    multivitamin  1 tablet Oral Daily    Vitamin D  1,000 Units Oral Daily    sodium chloride flush  5-40 mL IntraVENous 2 times per day    enoxaparin  40 mg SubCUTAneous Daily    methylPREDNISolone  40 mg IntraVENous Q12H    Followed by    [START ON 3/3/2023] predniSONE  40 mg Oral Daily    insulin lispro  0-8 Units SubCUTAneous Q4H    cefepime  2,000 mg IntraVENous Q12H    vancomycin  500 mg IntraVENous Q12H    aspirin  81 mg Oral Daily    ipratropium-albuterol  1 ampule Inhalation 4x daily      dextrose      sodium chloride 25 mL (03/01/23 1725)    sodium chloride 75 mL/hr at 03/01/23 1722         Lab Data:  CBC:   Recent Labs     02/28/23  1509 03/01/23  0803   WBC 29.5* 28.0*   HGB 14.0 13.4*   HCT 42.6 41.9   MCV 95.5 96.2    231     BMP:   Recent Labs     02/28/23  1509 03/01/23  0803 03/02/23  0803    136 139   K 3.5 3.7 4.0   CL 96* 101 105   CO2 29 25 26   BUN 25* 26* 19   CREATININE 1.8* 1.1 0.7*     LIVER PROFILE:   Recent Labs     02/28/23  1509 03/01/23  0803   AST 30 26   ALT 15 14   LIPASE 12.0*   --    BILITOT 0.6 0.4   ALKPHOS 107 89     PT/INR:   Recent Labs     02/28/23  1509   PROTIME 15.5*   INR 1.25*     APTT: No results for input(s): APTT in the last 72 hours. BNP:  No results for input(s): BNP in the last 72 hours. IMAGING:     Summary echo 3.1.23   Left ventricular systolic function is normal with ejection fraction   estimated at 55-60 %. No regional wall motion abnormalities are noted. There is moderate concentric left ventricular hypertrophy. Normal left ventricular diastolic filling pressure. The right atrium is mildly dilated. Moderate posterior mitral annular calcification is present. Aortic sclerosis no significant aortic stenosis. Trivial tricuspid regurgitation. Unable to obtain SPAP due to lack of tricuspid regurgitation. Epicardiac fat pad is present. 2- 55-60 AV sclerosis, DD1, LVH      Signature      ------------------------------------------------------------------   Electronically signed by Cira Baxter MD, [address]  (Interpreting   physician) on 03/01/2023 at 03:57 PM  EKG:  I have reviewed EKG with the following interpretation:  Impression:   Sinus rhythm with 1st degree A-V blockLeft axis deviation Low voltage QRS Left anterior fascicular blockSeptal infarct (cited on or before 28-FEB-2023)Abnormal ECGWhen compared with ECG of 28-FEB-2023 15:00,No significant change was foundConfirmed by Tee Shannon MD, [address] (1986) on 3/1/2023 5:26:32 AM  Sinus tachycardia with 1st degree A-V blockLeft axis deviationPulmonary disease patternSeptal infarct , age undeterminedAbnormal ECGWhen compared with ECG of 05-MAY-2022 12:51,No significant change was foundConfirmed by Tee Shannon MD, [address] (1986) on 2/28/2023 5:13:51 PM       CXRAY 2.28.23      FINDINGS:   Normal cardiomediastinal silhouette. No acute airspace infiltrate.   No   pneumothorax or pleural effusion           Impression   No acute cardiopulmonary findings          ProBNP 3481 troponin <0.01  Assessment  Cough syncope, precipitated by dehydration meds and hypoxia  Acute exacerbation of COPD  Bacterial bronchitis  Cellulitis rt forearm  MICHAEL improved with hydration  Primary hypertension  controlled with current regimen continue at discharge  Hyperlipidemia  Cirrhosis of liver   He is on pain meds percocet and gabapantine goes to pain management  Asa statin  Antibiotics  Bronchodilators   O2 supplementation  echo is  consistent with hypertension and  we donot need any further testing  Syncope due to dehydration low BP  cough with hypoxia   WILL SIGN OFF. I will address the patient's cardiac risk factors and adjusted pharmacologic treatment as needed. In addition, I have reinforced the need for patient directed risk factor modification. Tobacco use was discussed with the patient and educated on the negative effects. I have asked the patient to not utilize these agents. Thank you for allowing to us to participate in the care or Adele Kussmaul. Further evaluation will be based upon the patient's clinical course and testing results. All questions and concerns were addressed to the patient/family. Alternatives to my treatment were discussed. The note was completed using EMR. Every effort was made to ensure accuracy; however, inadvertent computerized transcription errors may be present.         Plan:        Core Measures:  Discharge instructions:   LVEF documented:   ACEI for LV dysfunction:   Smoking Cessation:    Wilman Walton MD, MD 3/2/2023 8:40 AM

## 2023-03-02 NOTE — PROGRESS NOTES
[facility]                                 Pulmonary Consult /Progress Note :                                                                  CHIEF COMPLAINT: Shortness of breath    HPI:   Bairon Alegria much better  SOB has improved  Still with some cough  Blood culture +Grow A beta   Denies any CP        Objective:   PHYSICAL EXAM:    Blood pressure 122/68, pulse 82, temperature 97.1 °F (36.2 °C), temperature source Oral, resp. rate 16, height 5' 7.75\" (1.721 m), weight 205 lb 8 oz (93.2 kg), SpO2 96 %.' on RA  Gen: No distress. Eyes: PERRL. No sclera icterus. No conjunctival injection. ENT: No discharge. Pharynx clear. Neck: Trachea midline. No obvious mass. Resp:scattered rhonchi/wheezing /rales/CTA bilateral   CV: Regular rate. Regular rhythm. No murmur or rub. No edema. GI: Non-tender. Non-distended. No hernia. Skin: Warm and dry. No nodule on exposed extremities. Lymph: No cervical LAD. No supraclavicular LAD. M/S: No cyanosis. No joint deformity. No clubbing. Neuro: Awake. Alert. Moves all four extremities. Psych: Oriented x 3. No anxiety.              LABS/IMAGING:    CBC:  Lab Results   Component Value Date    WBC 28.0 (H) 03/01/2023    HGB 13.4 (L) 03/01/2023    HCT 41.9 03/01/2023    MCV 96.2 03/01/2023     03/01/2023    LYMPHOPCT 4.0 03/01/2023    RBC 4.36 03/01/2023    MCH 30.8 03/01/2023    MCHC 32.0 03/01/2023    RDW 15.2 03/01/2023    NEUTOPHILPCT 92.7 03/01/2023    MONOPCT 3.2 03/01/2023    EOSPCT 1.5 01/12/2011    BASOPCT 0.1 03/01/2023    NEUTROABS 25.9 (H) 03/01/2023    LYMPHSABS 1.1 03/01/2023    MONOSABS 0.9 03/01/2023    EOSABS 0.0 03/01/2023    BASOSABS 0.0 03/01/2023       Recent Labs     03/01/23  0803 02/28/23  1509   WBC 28.0* 29.5*   HGB 13.4* 14.0   HCT 41.9 42.6   MCV 96.2 95.5    257         BMP:   Recent Labs     02/28/23  1509 03/01/23  0803 03/02/23  0803    136 139   K 3.5 3.7 4.0   CL 96* 101 105   CO2 29 25 26   BUN 25* 26* 19   CREATININE 1.8* 1.1 0.7*         MG:   Lab Results   Component Value Date/Time    MG 1.20 02/28/2023 03:09 PM     Ca/Phos:   Lab Results   Component Value Date    CALCIUM 8.8 03/02/2023    PHOS 4.7 05/09/2022     LIVER PROFILE:   Recent Labs     02/28/23  1509 03/01/23  0803   AST 30 26   ALT 15 14   LIPASE 12.0*  --    BILITOT 0.6 0.4   ALKPHOS 107 89         PT/INR:   Recent Labs     02/28/23  1509   PROTIME 15.5*   INR 1.25*       APTT: No results for input(s): APTT in the last 72 hours.         MV Settings:     / / /     IV:   dextrose      sodium chloride 25 mL (03/01/23 1725)    sodium chloride 75 mL/hr at 03/01/23 1722           Medications:  Scheduled Meds:   insulin glargine  6 Units SubCUTAneous Nightly    folic acid  3,826 mcg Oral Daily    ferrous sulfate  325 mg Oral Daily with breakfast    DULoxetine  60 mg Oral Daily    dilTIAZem  180 mg Oral Daily    atorvastatin  40 mg Oral Daily    metoprolol succinate  50 mg Oral Daily    pantoprazole  40 mg Oral Daily    thiamine  100 mg Oral Daily    multivitamin  1 tablet Oral Daily    Vitamin D  1,000 Units Oral Daily    sodium chloride flush  5-40 mL IntraVENous 2 times per day    enoxaparin  40 mg SubCUTAneous Daily    methylPREDNISolone  40 mg IntraVENous Q12H    Followed by    Tunde Cooley ON 3/3/2023] predniSONE  40 mg Oral Daily    insulin lispro  0-8 Units SubCUTAneous Q4H    cefepime  2,000 mg IntraVENous Q12H    vancomycin  500 mg IntraVENous Q12H    aspirin  81 mg Oral Daily    ipratropium-albuterol  1 ampule Inhalation 4x daily       PRN Meds:  albuterol, oxyCODONE-acetaminophen **OR** oxyCODONE-acetaminophen, magnesium sulfate, potassium chloride **OR** potassium alternative oral replacement **OR** potassium chloride, nicotine polacrilex, perflutren lipid microspheres, glucose, dextrose bolus **OR** dextrose bolus, glucagon (rDNA), dextrose, sodium chloride flush, sodium chloride, ondansetron **OR** ondansetron, polyethylene glycol, acetaminophen **OR** acetaminophen, melatonin, calcium carbonate    Results:  CBC:   Recent Labs     02/28/23  1509 03/01/23  0803   WBC 29.5* 28.0*   HGB 14.0 13.4*   HCT 42.6 41.9   MCV 95.5 96.2    231       BMP:   Recent Labs     02/28/23  1509 03/01/23  0803 03/02/23  0803    136 139   K 3.5 3.7 4.0   CL 96* 101 105   CO2 29 25 26   BUN 25* 26* 19   CREATININE 1.8* 1.1 0.7*       LIVER PROFILE:   Recent Labs     02/28/23  1509 03/01/23  0803   AST 30 26   ALT 15 14   LIPASE 12.0*  --    BILITOT 0.6 0.4   ALKPHOS 107 89       PT/INR:   Recent Labs     02/28/23  1509   PROTIME 15.5*   INR 1.25*       APTT: No results for input(s): APTT in the last 72 hours. UA:  Recent Labs     03/01/23  1020   COLORU Yellow   PHUR 6.0   WBCUA 0-2   RBCUA 0-2   MUCUS 1+*   BACTERIA 2+*   CLARITYU Clear   SPECGRAV 1.020   LEUKOCYTESUR Negative   UROBILINOGEN 0.2   BILIRUBINUR Negative   BLOODU Negative   GLUCOSEU Negative         Cultures:      Films:  CXR reviewed by me and it showed no acute process           Assessment:       -Acute hypoxic resp failure   -Possible pneumonia  -Acute on chronic hyppoxic resp failure   -smoking   -Possible indra  Group a strep sepsis /bactermia   Right ar cellulitis       Plan:    *_ sepsis with group A strep concern for right arm cellulitis . ,work up per primary   *- sat above 92-but less than 96   *-IV steroids ,wean PO and taper over a week   *-IV abx   *- viral panel negative  Pneumonia molecular panel  pending   *- Sputum culture pending   *_If in distress ,will use CPAP/BiPAP  *- procalcitonin  *-BD  ICS   Incentive spirmetery and flutter valve   Watch IVF   CT chest :follow up as OP   Check o2 at ambulation before discharge  If cultures negative can change PO abx       Thank you very much for allowing me to participate in the care of this pleasant patient , should you have any questions ,please do not hesitate to contact me      Ravi Dela Cruz MD [place],[facility]  Pulmonary&Critical Care Medicine    Claudia Field    NOTE: This report was transcribed using voice recognition software. Every effort was made to ensure accuracy; however, inadvertent computerized transcription errors may be present.

## 2023-03-02 NOTE — PLAN OF CARE
Problem: Discharge Planning  Goal: Discharge to home or other facility with appropriate resources  3/1/2023 2203 by Dylan Toussaint RN  Outcome: Progressing  3/1/2023 1527 by Shruti Stallworth RN  Outcome: Progressing     Problem: Pain  Goal: Verbalizes/displays adequate comfort level or baseline comfort level  3/1/2023 2203 by Dylan Toussaint RN  Outcome: Progressing  3/1/2023 1527 by Shruti Stallworth RN  Outcome: Progressing     Problem: Safety - Adult  Goal: Free from fall injury  3/1/2023 2203 by yDlan Toussaint RN  Outcome: Progressing  3/1/2023 1527 by Shruti Stallworth RN  Outcome: Progressing     Problem: Neurosensory - Adult  Goal: Achieves stable or improved neurological status  3/1/2023 2203 by Dylan Toussaint RN  Outcome: Progressing  3/1/2023 1527 by Shruti Stallworth RN  Outcome: Progressing     Problem: Respiratory - Adult  Goal: Achieves optimal ventilation and oxygenation  3/1/2023 2203 by Dylan Toussaint RN  Outcome: Progressing  3/1/2023 1527 by Shruti Stallworth RN  Outcome: Progressing     Problem: Cardiovascular - Adult  Goal: Maintains optimal cardiac output and hemodynamic stability  3/1/2023 2203 by Dylan Toussaint RN  Outcome: Progressing  3/1/2023 1527 by Shruti Stallworth RN  Outcome: Progressing     Problem: Skin/Tissue Integrity - Adult  Goal: Skin integrity remains intact  3/1/2023 2203 by Dylan Toussaint RN  Outcome: Progressing  Flowsheets (Taken 3/1/2023 1528 by Shruti Stallworth RN)  Skin Integrity Remains Intact:   Monitor for areas of redness and/or skin breakdown   Assess vascular access sites hourly  3/1/2023 1527 by Shruti Stallworth RN  Outcome: Progressing     Problem: Musculoskeletal - Adult  Goal: Return mobility to safest level of function  3/1/2023 2203 by Dylan Toussaint RN  Outcome: Progressing  3/1/2023 1527 by Shruti Stallworth RN  Outcome: Progressing     Problem: Gastrointestinal - Adult  Goal: Minimal or absence of nausea and vomiting  3/1/2023 2203 by Dylan Toussaint RN  Outcome: Progressing  3/1/2023 1527 by Lori Phillips RN  Outcome: Progressing     Problem: Infection - Adult  Goal: Absence of infection at discharge  3/1/2023 2203 by Unruly Genao RN  Outcome: Progressing  3/1/2023 1527 by Lori Phillips RN  Outcome: Progressing     Problem: Metabolic/Fluid and Electrolytes - Adult  Goal: Electrolytes maintained within normal limits  3/1/2023 2203 by Unruly Genao RN  Outcome: Progressing  3/1/2023 1527 by Lori Phillips RN  Outcome: Progressing     Problem: Hematologic - Adult  Goal: Maintains hematologic stability  3/1/2023 2203 by Unruly Genao RN  Outcome: Progressing  3/1/2023 1527 by Lori Phillips RN  Outcome: Progressing     Problem: Chronic Conditions and Co-morbidities  Goal: Patient's chronic conditions and co-morbidity symptoms are monitored and maintained or improved  3/1/2023 2203 by Unrluy Genao RN  Outcome: Progressing  3/1/2023 1527 by Lori Phillips RN  Outcome: Progressing     Problem: Skin/Tissue Integrity  Goal: Absence of new skin breakdown  Description: 1. Monitor for areas of redness and/or skin breakdown  2. Assess vascular access sites hourly  3. Every 4-6 hours minimum:  Change oxygen saturation probe site  4. Every 4-6 hours:  If on nasal continuous positive airway pressure, respiratory therapy assess nares and determine need for appliance change or resting period.   Outcome: Progressing

## 2023-03-03 VITALS
OXYGEN SATURATION: 94 % | DIASTOLIC BLOOD PRESSURE: 78 MMHG | HEIGHT: 68 IN | WEIGHT: 205.5 LBS | BODY MASS INDEX: 31.14 KG/M2 | RESPIRATION RATE: 18 BRPM | SYSTOLIC BLOOD PRESSURE: 129 MMHG | TEMPERATURE: 97.8 F | HEART RATE: 96 BPM

## 2023-03-03 LAB
ANION GAP SERPL CALCULATED.3IONS-SCNC: 10 MMOL/L (ref 3–16)
BLOOD CULTURE, ROUTINE: ABNORMAL
BUN BLDV-MCNC: 13 MG/DL (ref 7–20)
CALCIUM SERPL-MCNC: 9 MG/DL (ref 8.3–10.6)
CHLORIDE BLD-SCNC: 103 MMOL/L (ref 99–110)
CO2: 25 MMOL/L (ref 21–32)
CREAT SERPL-MCNC: <0.5 MG/DL (ref 0.8–1.3)
GFR SERPL CREATININE-BSD FRML MDRD: >60 ML/MIN/{1.73_M2}
GLUCOSE BLD-MCNC: 129 MG/DL (ref 70–99)
GLUCOSE BLD-MCNC: 134 MG/DL (ref 70–99)
GLUCOSE BLD-MCNC: 152 MG/DL (ref 70–99)
GLUCOSE BLD-MCNC: 157 MG/DL (ref 70–99)
GRAM STAIN RESULT: ABNORMAL
HCT VFR BLD CALC: 36.2 % (ref 40.5–52.5)
HEMOGLOBIN: 12.1 G/DL (ref 13.5–17.5)
MCH RBC QN AUTO: 31.8 PG (ref 26–34)
MCHC RBC AUTO-ENTMCNC: 33.5 G/DL (ref 31–36)
MCV RBC AUTO: 94.7 FL (ref 80–100)
ORGANISM: ABNORMAL
ORGANISM: ABNORMAL
PDW BLD-RTO: 14.6 % (ref 12.4–15.4)
PERFORMED ON: ABNORMAL
PLATELET # BLD: 195 K/UL (ref 135–450)
PMV BLD AUTO: 9 FL (ref 5–10.5)
POTASSIUM REFLEX MAGNESIUM: 3.6 MMOL/L (ref 3.5–5.1)
RBC # BLD: 3.82 M/UL (ref 4.2–5.9)
SODIUM BLD-SCNC: 138 MMOL/L (ref 136–145)
WBC # BLD: 10.6 K/UL (ref 4–11)
WOUND/ABSCESS: ABNORMAL

## 2023-03-03 PROCEDURE — 97530 THERAPEUTIC ACTIVITIES: CPT

## 2023-03-03 PROCEDURE — 80048 BASIC METABOLIC PNL TOTAL CA: CPT

## 2023-03-03 PROCEDURE — 85027 COMPLETE CBC AUTOMATED: CPT

## 2023-03-03 PROCEDURE — 2580000003 HC RX 258: Performed by: INTERNAL MEDICINE

## 2023-03-03 PROCEDURE — 94640 AIRWAY INHALATION TREATMENT: CPT

## 2023-03-03 PROCEDURE — 97116 GAIT TRAINING THERAPY: CPT

## 2023-03-03 PROCEDURE — 2700000000 HC OXYGEN THERAPY PER DAY

## 2023-03-03 PROCEDURE — 94761 N-INVAS EAR/PLS OXIMETRY MLT: CPT

## 2023-03-03 PROCEDURE — 6370000000 HC RX 637 (ALT 250 FOR IP): Performed by: INTERNAL MEDICINE

## 2023-03-03 PROCEDURE — 6360000002 HC RX W HCPCS: Performed by: INTERNAL MEDICINE

## 2023-03-03 PROCEDURE — 36415 COLL VENOUS BLD VENIPUNCTURE: CPT

## 2023-03-03 PROCEDURE — 99233 SBSQ HOSP IP/OBS HIGH 50: CPT | Performed by: INTERNAL MEDICINE

## 2023-03-03 RX ORDER — PREDNISONE 10 MG/1
TABLET ORAL
Qty: 18 TABLET | Refills: 0 | Status: SHIPPED | OUTPATIENT
Start: 2023-03-03

## 2023-03-03 RX ORDER — IPRATROPIUM BROMIDE AND ALBUTEROL SULFATE 2.5; .5 MG/3ML; MG/3ML
1 SOLUTION RESPIRATORY (INHALATION) 2 TIMES DAILY
Status: DISCONTINUED | OUTPATIENT
Start: 2023-03-03 | End: 2023-03-03 | Stop reason: HOSPADM

## 2023-03-03 RX ORDER — AMOXICILLIN AND CLAVULANATE POTASSIUM 875; 125 MG/1; MG/1
1 TABLET, FILM COATED ORAL 2 TIMES DAILY
Qty: 14 TABLET | Refills: 0 | Status: SHIPPED | OUTPATIENT
Start: 2023-03-03 | End: 2023-03-10

## 2023-03-03 RX ADMIN — ENOXAPARIN SODIUM 40 MG: 100 INJECTION SUBCUTANEOUS at 09:17

## 2023-03-03 RX ADMIN — Medication 1000 UNITS: at 09:17

## 2023-03-03 RX ADMIN — IPRATROPIUM BROMIDE AND ALBUTEROL SULFATE 1 AMPULE: .5; 2.5 SOLUTION RESPIRATORY (INHALATION) at 07:58

## 2023-03-03 RX ADMIN — DULOXETINE HYDROCHLORIDE 60 MG: 60 CAPSULE, DELAYED RELEASE ORAL at 09:16

## 2023-03-03 RX ADMIN — METOPROLOL SUCCINATE 50 MG: 50 TABLET, EXTENDED RELEASE ORAL at 09:17

## 2023-03-03 RX ADMIN — PANTOPRAZOLE SODIUM 40 MG: 40 TABLET, DELAYED RELEASE ORAL at 09:17

## 2023-03-03 RX ADMIN — VANCOMYCIN HYDROCHLORIDE 500 MG: 500 INJECTION, POWDER, LYOPHILIZED, FOR SOLUTION INTRAVENOUS at 05:56

## 2023-03-03 RX ADMIN — PREDNISONE 40 MG: 20 TABLET ORAL at 09:16

## 2023-03-03 RX ADMIN — FERROUS SULFATE TAB 325 MG (65 MG ELEMENTAL FE) 325 MG: 325 (65 FE) TAB at 09:17

## 2023-03-03 RX ADMIN — DILTIAZEM HYDROCHLORIDE 180 MG: 180 CAPSULE, COATED, EXTENDED RELEASE ORAL at 09:17

## 2023-03-03 RX ADMIN — Medication 100 MG: at 09:17

## 2023-03-03 RX ADMIN — MULTIPLE VITAMINS W/ MINERALS TAB 1 TABLET: TAB at 09:16

## 2023-03-03 RX ADMIN — FOLIC ACID 1000 MCG: 1 TABLET ORAL at 09:17

## 2023-03-03 RX ADMIN — ATORVASTATIN CALCIUM 40 MG: 40 TABLET, FILM COATED ORAL at 09:17

## 2023-03-03 RX ADMIN — ASPIRIN 81 MG: 81 TABLET, COATED ORAL at 09:16

## 2023-03-03 RX ADMIN — CEFEPIME 2000 MG: 2 INJECTION, POWDER, FOR SOLUTION INTRAVENOUS at 05:54

## 2023-03-03 NOTE — PROGRESS NOTES
Inpatient Physical Therapy Treatment    Unit: 2 [facility]  Date:  3/3/2023  Patient Name:    Juan Becerril  Admitting diagnosis:  Syncope and collapse [R55]  COPD exacerbation ([facility] [J44.1]  MICHAEL (acute kidney injury) ([facility] [N17.9]  Cellulitis of right upper extremity [L03.113]  Sepsis ([facility] [A41.9]  Sepsis due to cellulitis ([facility] [L03.90, A41.9]  Admit Date:  2/28/2023  Precautions/Restrictions/WB Status/ Lines/ Wounds/ Oxygen: Fall risk, Bed/chair alarm, Lines (IV and Supplemental O2 (2L)), Telemetry, and Continuous pulse oximetry    Treatment Time:  911- 480  Treatment Number:  2  Timed Code Treatment Minutes: 39 minutes  Total Treatment Minutes:  39  minutes    Patient Stated Goals for Therapy: \" to get back home \"          Discharge Recommendations: Home PT  DME needs for discharge:  pt owns RW and pulse ox       Therapy recommendation for EMS Transport: can transport by wheelchair    Therapy recommendations for staff:   Assist of 1 for ambulation with use of No AD to/from bathroom    History of Present Illness:   per H&P   78 y.o. male with PMH below, presents with SOB/FALCON, increased O2 demand, R forearm redness, pain and swelling, recent fall/syncope, productive cough, intermittent CP. Pt reports that he had a coughing fit 4-5 d ago that made him pass out and fall. He says he hit his R forearm on a fan. He says he had a similar episode of syncope several months ago. He had a skin tear to his R forearm. He says the next day he began having redness around to wound which increased to involve nearly all of his R forearm since. He also says within a day or 2 he developed a large white area centrally which has not changed since. His forearm has become increasingly painful. Pain is sharp, constant, severe. Worse w/ palpation and use of his arm. He also notes that for the last week or so he has had increasingly SOB/FALCON w/ increasingly productive cough.   He says he normally wears 2-3 L O2 ON and PRN w/ exertions.  He says he rarely requires it during the day.  However, the last few days he has been requiring during the day and at rest.  He also notes that he has had some intermittent L CP over the last week.  He has had exac w/ cough and when he is feeling particularly SOB.  None currently.  He still has some pain in his R neck since the fall    Home Health S4 Level Recommendation:  Level 1 Standard    AM-PAC Mobility Score    AM-PAC Inpatient Mobility Raw Score : 19       Subjective  Patient lying reclined bed with no family present.  Pt agreeable to this PT session.     Cognition    A&O x4   Able to follow 2 step commands    Pain   Yes  Location: arm  Rating: mild /10  Pain Medicine Status: RN notified    Preadmission Environment:   Pt. Lives                                              Alone- brother lives close by   Home environment:                            one story home and mobile home/trailer  Steps to enter first floor:                     3+1  steps to enter with bilateral railings  Steps to second floor/basement:        N/A  Laundry:                                              Goes to laundry mat with brother   Bathroom:                                           Tub shower   Pt sleeps in a:                                     Flat bed  Equipment owned:                              RW, SPC, raised toilet seat, and home O2 (3L) PRN, pulse ox      Preadmission Status:  Pt. Able to drive:                                 Yes   Pt. Fully independent with ADLs:       Yes  Pt. Required assistance for:               Independent PTA- niece does come over and assists with cleaning   Pt. independent for functional transfers and utilized No Device for mobility in home and No Device out in community  History of falls:                                                Yes  Home Health Services:                       None      Balance  Static Sitting:  Good ; Supervision  Dynamic Sitting:  Good ; SBA  Comments: sat EOB  ~4 minutes    Static Standing: Fair ; SBA  Dynamic Standing: Fair ; SBA  Comments: Pt stands and ambulates without AD and no LOB    Posture  Seated: WNL  Standing: Forward head and neck    Bed Mobility   Supine to Sit:    Supervision  Sit to Supine:   Not Tested  Rolling:   Not Tested  Scooting in sitting: Supervision  Scooting in supine:  Not Tested  Bridging:  Not Tested    Transfer Training     Sit to stand:   SBA  Stand to sit:   SBA  Bed to Chair:   Not Tested with use of No AD    Gait gait completed as indicated below  Distance:      2 x 125 ft   Deviations (firm surface/linoleum):  decreased edenilson, increased LUPE, deviated path, and decreased step length bilaterally  Assistive Device Used:    No AD  Level of Assist:    SBA  Comment: VC for O2 online management throughout ambulation    Stair Training stairs completed as indicated below  # of Steps:   4  Level of Assist:  CGA  UE Support:  bilateral  Assistive Device:  No AD  Pattern:   Reciprocal ascending and step to descending  Comments:     Therapeutic Exercises Initiated  deferred secondary to treatment focus on functional mobility  Supine:  N/A    Seated:  N/A    Standing:  N/A    Activity Tolerance   During therapy session noted pt with SOB/FALCON  desaturation of O2  see vitals chart below for details    Pt Position BP (mmHg) HR (bpm) SpO2 (%) on 2  Comments   Supine at rest       Seated at EOB       Ambulating  105 95 After ambulating to restroom   End of session   **O2 off at end of session per RN** >> 96 Supine in bed      Positioning Needs   Pt in bed, alarm set, positioned in proper neutral alignment and pressure relief provided. Call light provided and all needs within reach  RN aware of pt position/status    Other Activities  None.     Patient/Family Education   Pt educated on role of inpatient PT, POC, importance of continued activity, DC recommendations, safety awareness, transfer techniques, pursed lip breathing, and calling for assist with mobility. Assessment  Pt seen today for physical therapy Treatment. Pt demonstrated decreased Activity tolerance, Balance, Safety, and Strength as well as decreased independence with Ambulation. Pt currently requires SBA for all functional transfers, ambulation and stair management. Improved O2 line management this date. Pt SpO2 > 90% throughout session. Pt demo improved functional activity tolerance this date ambulating further distances and completing stair management. Pt would continue to benefit from skilled PT services throughout inpatient stay to promote above deficits in addition to continued therapy upon discharge. Recommending Home PRN assist and with home PT upon discharge as patient functioning would benefit from continued therapy services    Goals : To be met in 3 visits:  1). Independent with LE Ex x 10 reps  2). Sit to/from stand: Independent  3). Bed to chair: Independent  4). CHF goal: N/A    To be met in 6 visits:  1). Gait: Ambulate 150 ft.  with Independent and use of LRAD (least restrictive assistive device)  2). Tolerate B LE exercises 3 sets of 10-15 reps  3). Ascend/descend 3+ 1 steps with Independent with use of hand rail bilateral and No AD and LRAD (least restrictive assistive device)    All goals ongoing    Rehabilitation Potential: Fair  Strengths for achieving goals include:   Pt motivated, PLOF, and Pt cooperative   Barriers to achieving goals include:    Pain    Plan    To be seen 3-5 x / week  while in acute care setting for therapeutic exercises, bed mobility, transfers, progressive gait training, balance training, and family/patient education. Signature: Cheyenne Cortes PT     If patient discharges from this facility prior to next visit, this note will serve as the Discharge Summary.

## 2023-03-03 NOTE — PLAN OF CARE
Problem: Discharge Planning  Goal: Discharge to home or other facility with appropriate resources  3/2/2023 2029 by Layne Bailey RN  Outcome: Progressing  3/2/2023 1452 by Meg Hyde RN  Outcome: Progressing     Problem: Pain  Goal: Verbalizes/displays adequate comfort level or baseline comfort level  3/2/2023 2029 by Layne Bailey RN  Outcome: Progressing  3/2/2023 1452 by Meg Hyde RN  Outcome: Progressing     Problem: Safety - Adult  Goal: Free from fall injury  3/2/2023 2029 by Layne Bailey RN  Outcome: Progressing  3/2/2023 1452 by Meg Hyde RN  Outcome: Progressing     Problem: Neurosensory - Adult  Goal: Achieves stable or improved neurological status  3/2/2023 2029 by Layne Bailey RN  Outcome: Progressing  3/2/2023 1452 by Meg Hyde RN  Outcome: Progressing     Problem: Respiratory - Adult  Goal: Achieves optimal ventilation and oxygenation  3/2/2023 2029 by Layne Bailey RN  Outcome: Progressing  3/2/2023 1452 by Meg Hyde RN  Outcome: Progressing     Problem: Cardiovascular - Adult  Goal: Maintains optimal cardiac output and hemodynamic stability  3/2/2023 2029 by Layne Bailey RN  Outcome: Progressing  3/2/2023 1452 by Meg Hyde RN  Outcome: Progressing     Problem: Skin/Tissue Integrity - Adult  Goal: Skin integrity remains intact  3/2/2023 2029 by Layne Bailey RN  Outcome: Progressing  Flowsheets (Taken 3/2/2023 1453 by Meg Hyde RN)  Skin Integrity Remains Intact:   Monitor for areas of redness and/or skin breakdown   Assess vascular access sites hourly  3/2/2023 1452 by Meg Hyde RN  Outcome: Progressing     Problem: Musculoskeletal - Adult  Goal: Return mobility to safest level of function  3/2/2023 2029 by Layne Bailey RN  Outcome: Progressing  3/2/2023 1452 by Meg Hyde RN  Outcome: Progressing     Problem: Gastrointestinal - Adult  Goal: Minimal or absence of nausea and vomiting  3/2/2023 2029 by Layne Bailey RN  Outcome: Progressing  3/2/2023 1452 by Claudell Michaels, RN  Outcome: Progressing     Problem: Infection - Adult  Goal: Absence of infection at discharge  3/2/2023 2029 by Howard Cook RN  Outcome: Progressing  3/2/2023 1452 by Claudell Michaels, RN  Outcome: Progressing     Problem: Metabolic/Fluid and Electrolytes - Adult  Goal: Electrolytes maintained within normal limits  3/2/2023 2029 by Howard Cook RN  Outcome: Progressing  3/2/2023 1452 by Claudell Michaels, RN  Outcome: Progressing     Problem: Hematologic - Adult  Goal: Maintains hematologic stability  3/2/2023 2029 by Howard Cook RN  Outcome: Progressing  3/2/2023 1452 by Claudell Michaels, RN  Outcome: Progressing     Problem: Chronic Conditions and Co-morbidities  Goal: Patient's chronic conditions and co-morbidity symptoms are monitored and maintained or improved  3/2/2023 2029 by Howard Cook RN  Outcome: Progressing  3/2/2023 1452 by Claudell Michaels, RN  Outcome: Progressing     Problem: Skin/Tissue Integrity  Goal: Absence of new skin breakdown  Description: 1. Monitor for areas of redness and/or skin breakdown  2. Assess vascular access sites hourly  3. Every 4-6 hours minimum:  Change oxygen saturation probe site  4. Every 4-6 hours:  If on nasal continuous positive airway pressure, respiratory therapy assess nares and determine need for appliance change or resting period.   3/2/2023 2029 by Howard Cook RN  Outcome: Progressing  3/2/2023 1452 by Claudell Michaels, RN  Outcome: Progressing

## 2023-03-03 NOTE — PROGRESS NOTES
[facility]                                 Pulmonary Consult /Progress Note :                                                                  CHIEF COMPLAINT: Shortness of breath    HPI:   Larene Bible much better and back to base line breathing wise  SOB has improved  Still with some cough  Blood culture +Grow A beta   Denies any CP        Objective:   PHYSICAL EXAM:    Blood pressure 129/78, pulse 96, temperature 97.8 °F (36.6 °C), temperature source Oral, resp. rate 18, height 5' 7.75\" (1.721 m), weight 205 lb 8 oz (93.2 kg), SpO2 94 %.' on RA  Gen: No distress. Eyes: PERRL. No sclera icterus. No conjunctival injection. ENT: No discharge. Pharynx clear. Neck: Trachea midline. No obvious mass. Resp:scattered rhonchi/wheezing /rales/CTA bilateral   CV: Regular rate. Regular rhythm. No murmur or rub. No edema. GI: Non-tender. Non-distended. No hernia. Skin: Warm and dry. No nodule on exposed extremities. Lymph: No cervical LAD. No supraclavicular LAD. M/S: No cyanosis. No joint deformity. No clubbing. Neuro: Awake. Alert. Moves all four extremities. Psych: Oriented x 3. No anxiety.              LABS/IMAGING:    CBC:  Lab Results   Component Value Date    WBC 10.6 03/03/2023    HGB 12.1 (L) 03/03/2023    HCT 36.2 (L) 03/03/2023    MCV 94.7 03/03/2023     03/03/2023    LYMPHOPCT 4.0 03/01/2023    RBC 3.82 (L) 03/03/2023    MCH 31.8 03/03/2023    MCHC 33.5 03/03/2023    RDW 14.6 03/03/2023    NEUTOPHILPCT 92.7 03/01/2023    MONOPCT 3.2 03/01/2023    EOSPCT 1.5 01/12/2011    BASOPCT 0.1 03/01/2023    NEUTROABS 25.9 (H) 03/01/2023    LYMPHSABS 1.1 03/01/2023    MONOSABS 0.9 03/01/2023    EOSABS 0.0 03/01/2023    BASOSABS 0.0 03/01/2023       Recent Labs     03/03/23  0609 03/01/23  0803 02/28/23  1509   WBC 10.6 28.0* 29.5*   HGB 12.1* 13.4* 14.0   HCT 36.2* 41.9 42.6   MCV 94.7 96.2 95.5    231 257         BMP:   Recent Labs 03/01/23  0803 03/02/23  0803 03/03/23  0609    139 138   K 3.7 4.0 3.6    105 103   CO2 25 26 25   BUN 26* 19 13   CREATININE 1.1 0.7* <0.5*         MG:   Lab Results   Component Value Date/Time    MG 1.20 02/28/2023 03:09 PM     Ca/Phos:   Lab Results   Component Value Date    CALCIUM 9.0 03/03/2023    PHOS 4.7 05/09/2022     LIVER PROFILE:   Recent Labs     02/28/23  1509 03/01/23  0803   AST 30 26   ALT 15 14   LIPASE 12.0*  --    BILITOT 0.6 0.4   ALKPHOS 107 89         PT/INR:   Recent Labs     02/28/23  1509   PROTIME 15.5*   INR 1.25*       APTT: No results for input(s): APTT in the last 72 hours.         MV Settings:     / / /     IV:   dextrose      sodium chloride 25 mL (03/02/23 0982)           Medications:  Scheduled Meds:   ipratropium-albuterol  1 ampule Inhalation BID    insulin glargine  6 Units SubCUTAneous Nightly    folic acid  2,396 mcg Oral Daily    ferrous sulfate  325 mg Oral Daily with breakfast    DULoxetine  60 mg Oral Daily    dilTIAZem  180 mg Oral Daily    atorvastatin  40 mg Oral Daily    metoprolol succinate  50 mg Oral Daily    pantoprazole  40 mg Oral Daily    thiamine  100 mg Oral Daily    multivitamin  1 tablet Oral Daily    Vitamin D  1,000 Units Oral Daily    sodium chloride flush  5-40 mL IntraVENous 2 times per day    enoxaparin  40 mg SubCUTAneous Daily    predniSONE  40 mg Oral Daily    insulin lispro  0-8 Units SubCUTAneous Q4H    cefepime  2,000 mg IntraVENous Q12H    vancomycin  500 mg IntraVENous Q12H    aspirin  81 mg Oral Daily       PRN Meds:  albuterol, oxyCODONE-acetaminophen **OR** oxyCODONE-acetaminophen, magnesium sulfate, potassium chloride **OR** potassium alternative oral replacement **OR** potassium chloride, nicotine polacrilex, perflutren lipid microspheres, glucose, dextrose bolus **OR** dextrose bolus, glucagon (rDNA), dextrose, sodium chloride flush, sodium chloride, ondansetron **OR** ondansetron, polyethylene glycol, acetaminophen **OR** acetaminophen, melatonin, calcium carbonate    Results:  CBC:   Recent Labs     02/28/23  1509 03/01/23  0803 03/03/23  0609   WBC 29.5* 28.0* 10.6   HGB 14.0 13.4* 12.1*   HCT 42.6 41.9 36.2*   MCV 95.5 96.2 94.7    231 195       BMP:   Recent Labs     03/01/23  0803 03/02/23  0803 03/03/23  0609    139 138   K 3.7 4.0 3.6    105 103   CO2 25 26 25   BUN 26* 19 13   CREATININE 1.1 0.7* <0.5*       LIVER PROFILE:   Recent Labs     02/28/23  1509 03/01/23  0803   AST 30 26   ALT 15 14   LIPASE 12.0*  --    BILITOT 0.6 0.4   ALKPHOS 107 89       PT/INR:   Recent Labs     02/28/23  1509   PROTIME 15.5*   INR 1.25*       APTT: No results for input(s): APTT in the last 72 hours. UA:  Recent Labs     03/01/23  1020   COLORU Yellow   PHUR 6.0   WBCUA 0-2   RBCUA 0-2   MUCUS 1+*   BACTERIA 2+*   CLARITYU Clear   SPECGRAV 1.020   LEUKOCYTESUR Negative   UROBILINOGEN 0.2   BILIRUBINUR Negative   BLOODU Negative   GLUCOSEU Negative         Cultures:      Films:  CXR reviewed by me and it showed no acute process           Assessment:       -Acute hypoxic resp failure   -Possible pneumonia  -Acute on chronic hyppoxic resp failure   -smoking   -Possible indra  Group a strep sepsis /bactermia   Right ar cellulitis       Plan:    *_ sepsis with group A strep concern for right arm cellulitis . ,work up per primary   *- sat above 92-but less than 96   *-IV steroids ,wean PO and taper over a week   *-IV abx as per primary  *- viral panel negative  *_If in distress ,will use CPAP/BiPAP  *- procalcitonin 22  *-BD  ICS   Incentive spirmetery and flutter valve   Watch IVF   CT chest :follow up as OP   Check o2 at ambulation before discharge  I      Thank you very much for allowing me to participate in the care of this pleasant patient , should you have any questions ,please do not hesitate to contact me      Kar Arnett MD,[facility]  Pulmonary&Critical Care Medicine    Earl Rodriguez    NOTE: This report was transcribed using voice recognition software. Every effort was made to ensure accuracy; however, inadvertent computerized transcription errors may be present.

## 2023-03-03 NOTE — FLOWSHEET NOTE
Shift assessment complete. See doc flow. Nightly medications given see MAR. A/O x4. Patient with no complaints at this time. O2 sats 97% on 2L via nc. PRN  melatonin given for sleep. Bed alarm in place. Call light and bedside table within easy reach.     03/02/23 1921   Vital Signs   Temp 96.9 °F (36.1 °C)   Temp Source Oral   Heart Rate 95   Heart Rate Source Monitor   Resp 18   /81   MAP (Calculated) 99   BP Location Left upper arm   BP Method Automatic   Patient Position Semi fowlers   Level of Consciousness 0   MEWS Score 1   Oxygen Therapy   SpO2 97 %   O2 Device Nasal cannula   O2 Flow Rate (L/min) 1 L/min

## 2023-03-03 NOTE — PROGRESS NOTES
Vancomycin Day: 4  Current Regimen: 500 mg IV every 12 hours    Patient's labs, cultures, vitals, and vancomycin regimen reviewed.    SCr stable  U/O not measured/well documented  [employer] updated    Plan:   No change today,    Leonarda Randhawa D 3/3/607600:22 AM  .

## 2023-03-03 NOTE — PROGRESS NOTES
RT Inhaler-Nebulizer Bronchodilator Protocol Note    There is a bronchodilator order in the chart from a provider indicating to follow the RT Bronchodilator Protocol and there is an Initiate RT Inhaler-Nebulizer Bronchodilator Protocol order as well (see protocol at bottom of note). CXR Findings:  No results found. The findings from the last RT Protocol Assessment were as follows:   History Pulmonary Disease: (P) Chronic pulmonary disease  Respiratory Pattern: (P) Regular pattern and RR 12-20 bpm  Breath Sounds: (P) Intermittent or unilateral wheezes  Cough: (P) Strong, spontaneous, non-productive  Indication for Bronchodilator Therapy: (P) Decreased or absent breath sounds  Bronchodilator Assessment Score: (P) 6    Aerosolized bronchodilator medication orders have been revised according to the RT Inhaler-Nebulizer Bronchodilator Protocol below. Respiratory Therapist to perform RT Therapy Protocol Assessment initially then follow the protocol. Repeat RT Therapy Protocol Assessment PRN for score 0-3 or on second treatment, BID, and PRN for scores above 3. No Indications - adjust the frequency to every 6 hours PRN wheezing or bronchospasm, if no treatments needed after 48 hours then discontinue using Per Protocol order mode. If indication present, adjust the RT bronchodilator orders based on the Bronchodilator Assessment Score as indicated below. Use Inhaler orders unless patient has one or more of the following: on home nebulizer, not able to hold breath for 10 seconds, is not alert and oriented, cannot activate and use MDI correctly, or respiratory rate 25 breaths per minute or more, then use the equivalent nebulizer order(s) with same Frequency and PRN reasons based on the score. If a patient is on this medication at home then do not decrease Frequency below that used at home.     0-3 - enter or revise RT bronchodilator order(s) to equivalent RT Bronchodilator order with Frequency of every 4 hours PRN for wheezing or increased work of breathing using Per Protocol order mode. 4-6 - enter or revise RT Bronchodilator order(s) to two equivalent RT bronchodilator orders with one order with BID Frequency and one order with Frequency of every 4 hours PRN wheezing or increased work of breathing using Per Protocol order mode. 7-10 - enter or revise RT Bronchodilator order(s) to two equivalent RT bronchodilator orders with one order with TID Frequency and one order with Frequency of every 4 hours PRN wheezing or increased work of breathing using Per Protocol order mode. 11-13 - enter or revise RT Bronchodilator order(s) to one equivalent RT bronchodilator order with QID Frequency and an Albuterol order with Frequency of every 4 hours PRN wheezing or increased work of breathing using Per Protocol order mode. Greater than 13 - enter or revise RT Bronchodilator order(s) to one equivalent RT bronchodilator order with every 4 hours Frequency and an Albuterol order with Frequency of every 2 hours PRN wheezing or increased work of breathing using Per Protocol order mode. RT to enter RT Home Evaluation for COPD & MDI Assessment order using Per Protocol order mode.     Electronically signed by Shanae Moody RCP on 3/3/2023 at 8:01 AM

## 2023-03-03 NOTE — DISCHARGE INSTRUCTIONS
amoxicillin and clavulanate potassium  Pronunciation:  am OK i JANI in [address] ate cesar TAS ee um  Brand:  Augmentin  What is the most important information I should know about amoxicillin and clavulanate potassium? You should not use this medicine if you have severe kidney disease, if you have had liver problems or jaundice while taking amoxicillin and clavulanate potassium, or if you are allergic to any penicillin or cephalosporin antibiotic, such as Amoxil, Ceftin, Cefzil, Moxatag, Omnicef, and others. What is amoxicillin and clavulanate potassium? Amoxicillin is a penicillin antibiotic. Clavulanate potassium helps prevent certain bacteria from becoming resistant to amoxicillin. Amoxicillin and clavulanate potassium is a combination medicine used to treat many different infections caused by bacteria, such as sinusitis, pneumonia, ear infections, bronchitis, urinary tract infections, and infections of the skin. Amoxicillin and clavulanate potassium may also be used for purposes not listed in this medication guide. What should I discuss with my healthcare provider before taking amoxicillin and clavulanate potassium? You should not use this medicine if you are allergic to it, or if:  you have severe kidney disease (or if you are on dialysis);  you have had liver problems or jaundice while taking amoxicillin and clavulanate potassium; or  you are allergic to any penicillin or cephalosporin antibiotic, such as Amoxil, Ceftin, Cefzil, Moxatag, Omnicef, and others. Tell your doctor if you have ever had:  liver disease (hepatitis or jaundice);  kidney disease; or  mononucleosis. The liquid or chewable tablet may contain phenylalanine. Tell your doctor if you have phenylketonuria (PKU). Tell your doctor if you are pregnant or breastfeeding. Amoxicillin and clavulanate potassium can make birth control pills less effective.  Ask your doctor about using a non-hormonal birth control (condom, diaphragm, cervical cap, or contraceptive sponge) to prevent pregnancy. Do not give this medicine to a child without medical advice. How should I take amoxicillin and clavulanate potassium? Follow all directions on your prescription label and read all medication guides or instruction sheets. Use the medicine exactly as directed. Amoxicillin and clavulanate potassium may work best if you take it at the start of a meal.  Take the medicine every 12 hours. Do not crush or chew the extended-release tablet. Swallow the pill whole, or break the pill in half and take both halves one at a time. Tell your doctor if you have trouble swallowing a whole or half pill. You must chew the chewable tablet before you swallow it. Shake the oral suspension (liquid) before you measure a dose. Use the dosing syringe provided, or use a medicine dose-measuring device (not a kitchen spoon). This medicine can affect the results of certain medical tests. Tell any doctor who treats you that you are using amoxicillin and clavulanate potassium. Use this medicine for the full prescribed length of time, even if your symptoms quickly improve. Skipping doses can increase your risk of infection that is resistant to medication. Amoxicillin and clavulanate potassium will not treat a viral infection such as the flu or a common cold. Store the tablets at room temperature away from moisture and heat. Store the liquid  in the refrigerator. Throw away any unused liquid after 10 days. What happens if I miss a dose? Take the medicine as soon as you can, but skip the missed dose if it is almost time for your next dose. Do not take two doses at one time. What happens if I overdose? Seek emergency medical attention or call the Poison Help line at 1-966.894.4052. Overdose can cause nausea, vomiting, stomach pain, diarrhea, skin rash, drowsiness, hyperactivity, and decreased urination.   What should I avoid while taking amoxicillin and clavulanate potassium? Avoid taking this medicine together with or just after eating a high-fat meal. This will make it harder for your body to absorb the medication. Antibiotic medicines can cause diarrhea, which may be a sign of a new infection. If you have diarrhea that is watery or bloody, call your doctor before using anti-diarrhea medicine. What are the possible side effects of amoxicillin and clavulanate potassium? Get emergency medical help if you have signs of an allergic reaction (hives, difficult breathing, swelling in your face or throat) or a severe skin reaction (fever, sore throat, burning eyes, skin pain, red or purple skin rash with blistering and peeling). Call your doctor at once if you have:  severe stomach pain, diarrhea that is watery or bloody (even if it occurs months after your last dose);  pale or yellowed skin, dark colored urine, fever, confusion or weakness;  loss of appetite, upper stomach pain;  little or no urination; or  easy bruising or bleeding. Common side effects may include:  nausea, vomiting; diarrhea;  rash, itching;  vaginal itching or discharge; or  diaper rash. This is not a complete list of side effects and others may occur. Call your doctor for medical advice about side effects. You may report side effects to FDA at 7-165-FDA-9284. What other drugs will affect amoxicillin and clavulanate potassium? Tell your doctor about all your other medicines, especially:  allopurinol;  probenecid; or  a blood thinner --warfarin, Coumadin, Jantoven. This list is not complete. Other drugs may affect amoxicillin and clavulanate potassium, including prescription and over-the-counter medicines, vitamins, and herbal products. Not all possible drug interactions are listed here. Where can I get more information? Your pharmacist can provide more information about amoxicillin and clavulanate potassium.   Remember, keep this and all other medicines out of the reach of children, never share your medicines with others, and use this medication only for the indication prescribed. Every effort has been made to ensure that the information provided by [address] [place], [place] [address]can [address] is accurate, up-to-date, and complete, but no guarantee is made to that effect. Drug information contained herein may be time sensitive. [facility] information has been compiled for use by healthcare practitioners and consumers in the [place] and therefore [facility] does not warrant that uses outside of the [place] are appropriate, unless specifically indicated otherwise. [facility]'s drug information does not endorse drugs, diagnose patients or recommend therapy. [facility]'s drug information is an informational resource designed to assist licensed healthcare practitioners in caring for their patients and/or to serve consumers viewing this service as a supplement to, and not a substitute for, the expertise, skill, knowledge and judgment of healthcare practitioners. The absence of a warning for a given drug or drug combination in no way should be construed to indicate that the drug or drug combination is safe, effective or appropriate for any given patient. [facility] does not assume any responsibility for any aspect of healthcare administered with the aid of information [facility] provides. The information contained herein is not intended to cover all possible uses, directions, precautions, warnings, drug interactions, allergic reactions, or adverse effects. If you have questions about the drugs you are taking, check with your doctor, nurse or pharmacist.  Copyright 6640-2503 [address]: 12.01. Revision date: 2/24/2020. Care instructions adapted under license by [facility] ([facility]). If you have questions about a medical condition or this instruction, always ask your healthcare professional. [place] [address] any warranty or liability for your use of this information.

## 2023-03-03 NOTE — PROGRESS NOTES
RT Inhaler-Nebulizer Bronchodilator Protocol Note    There is a bronchodilator order in the chart from a provider indicating to follow the RT Bronchodilator Protocol and there is an Initiate RT Inhaler-Nebulizer Bronchodilator Protocol order as well (see protocol at bottom of note). CXR Findings:  No results found. The findings from the last RT Protocol Assessment were as follows:   History Pulmonary Disease: Chronic pulmonary disease  Respiratory Pattern: Dyspnea on exertion or RR 21-25 bpm  Breath Sounds: Intermittent or unilateral wheezes  Cough: Strong, spontaneous, non-productive  Indication for Bronchodilator Therapy: Wheezing associated with pulm disorder  Bronchodilator Assessment Score: 8    Aerosolized bronchodilator medication orders have been revised according to the RT Inhaler-Nebulizer Bronchodilator Protocol below. Respiratory Therapist to perform RT Therapy Protocol Assessment initially then follow the protocol. Repeat RT Therapy Protocol Assessment PRN for score 0-3 or on second treatment, BID, and PRN for scores above 3. No Indications - adjust the frequency to every 6 hours PRN wheezing or bronchospasm, if no treatments needed after 48 hours then discontinue using Per Protocol order mode. If indication present, adjust the RT bronchodilator orders based on the Bronchodilator Assessment Score as indicated below. Use Inhaler orders unless patient has one or more of the following: on home nebulizer, not able to hold breath for 10 seconds, is not alert and oriented, cannot activate and use MDI correctly, or respiratory rate 25 breaths per minute or more, then use the equivalent nebulizer order(s) with same Frequency and PRN reasons based on the score. If a patient is on this medication at home then do not decrease Frequency below that used at home.     0-3 - enter or revise RT bronchodilator order(s) to equivalent RT Bronchodilator order with Frequency of every 4 hours PRN for wheezing or increased work of breathing using Per Protocol order mode. 4-6 - enter or revise RT Bronchodilator order(s) to two equivalent RT bronchodilator orders with one order with BID Frequency and one order with Frequency of every 4 hours PRN wheezing or increased work of breathing using Per Protocol order mode. 7-10 - enter or revise RT Bronchodilator order(s) to two equivalent RT bronchodilator orders with one order with TID Frequency and one order with Frequency of every 4 hours PRN wheezing or increased work of breathing using Per Protocol order mode. 11-13 - enter or revise RT Bronchodilator order(s) to one equivalent RT bronchodilator order with QID Frequency and an Albuterol order with Frequency of every 4 hours PRN wheezing or increased work of breathing using Per Protocol order mode. Greater than 13 - enter or revise RT Bronchodilator order(s) to one equivalent RT bronchodilator order with every 4 hours Frequency and an Albuterol order with Frequency of every 2 hours PRN wheezing or increased work of breathing using Per Protocol order mode. PATIENT WILL BENEFIT FROM TREATMENTS.      Electronically signed by Gaetana Soulier, RCP on 3/2/2023 at 7:42 PM

## 2023-03-03 NOTE — PROGRESS NOTES
IM Progress Note    Admit Date:  2/28/2023    Admitted with sepsis, right arm cellulitis  COPD    Subjective:  Mr. Nelly Contreras feels much better today . Cough and shortness of breath have improved  . Oxygen saturations  stable on 3 L.-This is his home oxygen setting. No fevers    Objective:   /78   Pulse 96   Temp 97.8 °F (36.6 °C) (Oral)   Resp 18   Ht 5' 7.75\" (1.721 m)   Wt 205 lb 8 oz (93.2 kg)   SpO2 94%   BMI 31.48 kg/m²   No intake or output data in the 24 hours ending 03/03/23 5204      Physical Exam:  General: Patient appears chronically ill. awake, alert, NAD  Skin:  Warm and dry  Neck:  JVD absent. Neck supple  Chest: Diminished breath sounds, mild rhonchi . Cardiovascular:  RRR ,S1S2 normal  Abdomen:  Soft, non tender, non distended, BS +  Extremities:  No edema. Right arm decreased erythema  Intact peripheral pulses.  Brisk cap refill, < 2 secs  Neuro: non focal      Medications:   Scheduled Meds:   ipratropium-albuterol  1 ampule Inhalation BID    insulin glargine  6 Units SubCUTAneous Nightly    folic acid  1,144 mcg Oral Daily    ferrous sulfate  325 mg Oral Daily with breakfast    DULoxetine  60 mg Oral Daily    dilTIAZem  180 mg Oral Daily    atorvastatin  40 mg Oral Daily    metoprolol succinate  50 mg Oral Daily    pantoprazole  40 mg Oral Daily    thiamine  100 mg Oral Daily    multivitamin  1 tablet Oral Daily    Vitamin D  1,000 Units Oral Daily    sodium chloride flush  5-40 mL IntraVENous 2 times per day    enoxaparin  40 mg SubCUTAneous Daily    predniSONE  40 mg Oral Daily    insulin lispro  0-8 Units SubCUTAneous Q4H    cefepime  2,000 mg IntraVENous Q12H    vancomycin  500 mg IntraVENous Q12H    aspirin  81 mg Oral Daily       Continuous Infusions:   dextrose      sodium chloride 25 mL (03/02/23 6048)       Data:  CBC:   Recent Labs     03/01/23  0803 03/03/23  0609   WBC 28.0* 10.6   RBC 4.36 3.82*   HGB 13.4* 12.1*   HCT 41.9 36.2*   MCV 96.2 94.7   RDW 15.2 14.6   PLT 231 195       BMP:   Recent Labs     03/01/23  0803 03/02/23  0803 03/03/23  0609    139 138   K 3.7 4.0 3.6    105 103   CO2 25 26 25   BUN 26* 19 13   CREATININE 1.1 0.7* <0.5*       BNP: No results for input(s): BNP in the last 72 hours. PT/INR:   No results for input(s): PROTIME, INR in the last 72 hours. APTT: No results for input(s): APTT in the last 72 hours. CARDIAC ENZYMES:   No results for input(s): CKMB, CKMBINDEX, TROPONINI in the last 72 hours. Invalid input(s): CKTOTAL;3    FASTING LIPID PANEL:  Lab Results   Component Value Date    CHOL 154 07/11/2022    HDL 46 07/11/2022    TRIG 111 07/11/2022     LIVER PROFILE:   Recent Labs     03/01/23  0803   AST 26   ALT 14   BILITOT 0.4   ALKPHOS 89            Cultures  COVID/Influenza: not detected   Blood cultures positive for strep  Wound cultures positive for strep    Radiology  CT ABDOMEN PELVIS W IV CONTRAST Additional Contrast? None   Final Result   1. No focal abnormality in the right lower quadrant. 2. Early findings for cirrhosis. CT CHEST PULMONARY EMBOLISM W CONTRAST   Final Result   No evidence of pulmonary embolism or acute pulmonary abnormality. CT LUMBAR SPINE TRAUMA RECONSTRUCTION   Final Result   Unremarkable non-contrast CT of the lumbar spine. CT CERVICAL SPINE WO CONTRAST   Final Result   No acute abnormality of the cervical spine. CT HEAD WO CONTRAST   Final Result   No acute intracranial abnormality. XR RADIUS ULNA RIGHT (2 VIEWS)   Final Result   No evidence of osteomyelitis. XR CHEST PORTABLE   Final Result   No acute cardiopulmonary findings           echo   Summary   Left ventricular systolic function is normal with ejection fraction   estimated at 55-60 %. No regional wall motion abnormalities are noted. There is moderate concentric left ventricular hypertrophy. Normal left ventricular diastolic filling pressure. The right atrium is mildly dilated. Moderate posterior mitral annular calcification is present. Aortic sclerosis no significant aortic stenosis. Trivial tricuspid regurgitation. Unable to obtain SPAP due to lack of tricuspid regurgitation. Epicardiac fat pad is present. 2- 55-60 AV sclerosis, DD1, LVH      Assessment:  Principal Problem:    Sepsis ([facility] [address]  Active Problems:    Cellulitis of right upper extremity    Syncope and collapse    Sepsis due to cellulitis ([address]    COPD exacerbation ([facility] [address]    Hypercholesterolemia    PAD (peripheral artery disease) ([facility] [address]  Resolved Problems:    * No resolved hospital problems. *      Plan:    # Sepsis (WBC, RR, HR; lact pending, PCT added on). - Likely secondary to right arm cellulitis and pneumonia  -Wound and blood cultures positive for strep . - IV vanco and cefepime. Continue .  -White count 29 K on admission-> 28.5.-->  White count is down to 10.6 today monitor CBC    # R forearm cellulitis, fairly extensive. Large area of still attached but sloughed skin which has expressible purulent drainage. May require debridement. ABX as above. PRN Percocet. WC RN c/s. No OM on plain film. Right arm looks better today    #Acute on chronic respiratory failure with hypoxia. #Exacerbation of COPD  -On supplemental oxygen 3 L.  -Seen in consultation by pulmonology  - Patient normally 2-3L ON and PRN w/ exertion. Recently, requiring O2  at rest during the day. Has been requiring continuous oxygen  since arrival.  Supplemental O2 to maintain SPO2 ? 92%, continuous pulse ox. Wean as tolerated. Pulm c/s.  -Continue nebulizer treatments and IV Solu-Medrol    #Pneumonia  -Likely gram-positive infection  -Patient with cough shortness of breath and chills for 5 days  -Continue antibiotics vancomycin and cefepime     # Syncope and collapse. - 3-4 d ago. Says 2/2 coughing fit and he couldn't breathe.    -Monitor on tele. -Seen by cardiology  -Syncope is consistent with cough syncope.   Patient was also dehydrated. - chk orthostatics   - checked echo. Normal EF     # HTn  - BP stable     # MICHAEL, Cr 1.8. Last was 1.1 in 7/22. IVF and repeat   MICHAEL resolved creatinine improved from 1.8-> 1.1-> 0.7. Stop IV fluids  Monitor BMP    # DM2, hold oral Rx, chk A1c, add Mod SSI q4h, reduced home Lantus dose     # Elevated CK,   486. resolved with  IVF . # Hypo Mg, 1.2.  4g MgSO4 ordered. # Tobacco Abuse. He quit when he fell 4 days ago. Counseled continued cessation, 2 mg nicotine gum     #Admission for anemia and GI bleed in 2022    #History of cirrhosis     DVT Prophylaxis:   ADULT DIET; Regular  Code Status: Full Code     He is improving     Likely discharge home on oral antibiotics tomorrow        -   He has improved oxygen saturation stable on 2 L. Allena Closs He needs this continuously and given him a new prescription for home oxygen discussed with. .  Right arm is looking. Allena Closs Discharged home on oral antibiotics.      Roberta Ayala MD   3/3/2023 3:49 PM

## 2023-03-03 NOTE — PROGRESS NOTES
O2 Sat at rest on room air is 92 %. O2 Sat with activity on room air is 86 %. O2 Sat at rest with oxygen @  2 lpm is 96%. O2 Sat with activity with oxygen @ 2 lpm is 93%.

## 2023-03-03 NOTE — CARE COORDINATION
INTERDISCIPLINARY PLAN OF CARE CONFERENCE and Discharge Summary    Date/Time: 3/3/2023 10:45 AM  Completed by: MARK Cook  Case Management Department  Phone: [phone] Fax: [phone]   Case Management      Patient Name:  Mary [place]  YOB: 1955  Admitting Diagnosis: Syncope and collapse [R55]  COPD exacerbation ([facility] [address] [J44.1]  MICHAEL (acute kidney injury) ([facility] [address] [N17.9]  Cellulitis of right upper extremity [I81.171]  Sepsis ([facility] [address] [A41.9]  Sepsis due to cellulitis ([facility] [address] [L03.90, A41.9]     Admit Date/Time:  2/28/2023  2:44 PM    Chart reviewed. Interdisciplinary team contacted or reviewed plan related to patient progress and discharge plans. Disciplines included Case Management, Nursing, and Dietitian. Current Status:ongoing   PT/OT recommendation for discharge plan of care: home PT and Home with PRN assist. Shower chair    Expected D/C Disposition:  Home  Confirmed plan with patient and/or family Yes confirmed with: (name) pt  Met with:pt    Discharge Plan Comments: Chart review completed. Met with pt at bedside. Discussed PT/OT recommendations with pt and provided a [facility] [place] [facility] list. Pt stated he will return home when discharged. Discussed skilled home care for RN/PT/OT further with pt. Pt declined stating he is having someone through his insurance come out on the 22nd of this month and doesn't want skilled home care    Home O2 in place on admit: Yes    Addendum at 3:45pm: Spoke with Courtney Reyna at [address] [place] [address] who is aware pt will need cont o2 and requested the testing/documentation/orders be faxed to her at [phone] once in. Courtney Reyna stated to provide pt with an o2 tank from [employer] and they will deliver tanks to pt on Monday. Addendum at 3:56pm: Discharge order noted. Spoke with Courtney Reyna at [address] [place] [address] who is aware of discharge home today with new o2 orders for cont o2.  Faxed the RN note, DME order and MD note to Courtney Reyna at [phone] per her request.     Met with pt at bedside. Provided pt with a portable tank from [facility] and he is aware [employer] will be out Monday to deliver more tanks. Pt aware to talk with his PCP about a smaller tank as it needs additional testing. He stated understanding and he will speak with Dr. Joanne Albarado at the end of this month. Again discussed skilled home care for RN/PT/OT and pt declined stating he doesn't need this and will follow up with his PCP if he decides he wants it once home. Pt aware he needs cont o2 and he stated understanding. No questions expressed. Pt stated he will have a ride home    Date of Last IMM Given: 3/1/2023    Pt is being d/c'd to home today. Pt's O2 sats are 93% on 2 liters per vitals in epic. Discharge timeout done with Kayla Wells. All discharge needs and concerns addressed.

## 2023-03-05 LAB
CULTURE, BLOOD 2: ABNORMAL
CULTURE, BLOOD 2: ABNORMAL
ORGANISM: ABNORMAL
ORGANISM: ABNORMAL

## 2023-03-06 ENCOUNTER — TELEPHONE (OUTPATIENT)
Dept: FAMILY MEDICINE CLINIC | Age: 68
End: 2023-03-06

## 2023-03-06 NOTE — TELEPHONE ENCOUNTER
[facility] Transitions Initial Follow Up Call    Outreach made within 2 business days of discharge: Yes    Patient: Sandor Early Patient : 1955   MRN: [id number]  Reason for Admission: There are no discharge diagnoses documented for the most recent discharge.  Discharge Date: 3/3/23       Spoke with: Sandor    Discharge department/facility: [place]    Non-face-to-face services provided:  Scheduled appointment with PCP-RN reviewed medications and O2 with patient.    TCM Interactive Patient Contact:  Was patient able to fill all prescriptions: Yes  Was patient instructed to bring all medications to the follow-up visit: Yes  Is patient taking all medications as directed in the discharge summary? Yes  Does patient understand their discharge instructions: Yes  Does patient have questions or concerns that need addressed prior to 7-14 day follow up office visit: no    Scheduled appointment with PCP within 7-14 days    Follow Up  Future Appointments   Date Time Provider Department Center   3/13/2023 10:20 AM MD Edwin Almonte   3/21/2023  3:30 PM Ben Medeiros MD [facility]   2023 11:00 AM MD Edwin Almonte  Aleida Marcelo RN

## 2023-03-06 NOTE — DISCHARGE SUMMARY
Name:  Charbel Barnes  Room:  8834/6497-08  MRN:    [id number]    Discharge Summary      This discharge summary is in conjunction with a complete physical exam done on the day of discharge. Discharging Provider: Dr. Consuelo Mejia MD      Admit: 2/28/2023  Discharge: 3/3/2023    HPI taken from admission H&P:    The patient is a 78 y.o. male with PMH below, presents with SOB/FALCON, increased O2 demand, R forearm redness, pain and swelling, recent fall/syncope, productive cough, intermittent CP. Pt reports that he had a coughing fit 4-5 d ago that made him pass out and fall. He says he hit his R forearm on a fan. He says he had a similar episode of syncope several months ago. He had a skin tear to his R forearm. He says the next day he began having redness around to wound which increased to involve nearly all of his R forearm since. He also says within a day or 2 he developed a large white area centrally which has not changed since. His forearm has become increasingly painful. Pain is sharp, constant, severe. Worse w/ palpation and use of his arm. He also notes that for the last week or so he has had increasingly SOB/FALCON w/ increasingly productive cough. He says he normally wears 2-3 L O2 ON and PRN w/ exertions. He says he rarely requires it during the day. However, the last few days he has been requiring during the day and at rest.  He also notes that he has had some intermittent L CP over the last week. He has had exac w/ cough and when he is feeling particularly SOB. None currently. He still has some pain in his R neck since the fall. Diagnoses this Admission and Hospital Course    # Sepsis (WBC, RR, HR; lact pending, PCT added on). - Likely secondary to right arm cellulitis and pneumonia  -Wound and blood cultures positive for strep . - treated with IV vanco and cefepime. - monitored CBC.  White count 29 K on admission-> 28.5.-->  White count is down to 10.6 today   Sepsis resolved      # R forearm cellulitis, fairly extensive. Large area of still attached but sloughed skin which has expressible purulent drainage.    - did not require debridement. ABX as above. PRN Percocet. - WC RN c/s.   -No OM on plain film. Right arm looks better today   DC home on Po abx     #Acute on chronic respiratory failure with hypoxia. #Exacerbation of COPD  -On supplemental oxygen 3 L.  -Seen in consultation by pulmonology  - Patient normally 2-3L ON and PRN w/ exertion. Currently  requiring O2  at rest during the day. Has been requiring continuous oxygen  since arrival.  Supplemental O2 to maintain SPO2 ? 92%, continuous pulse ox. Wean as tolerated. Pulm c/s.  -Continue nebulizer treatments and IV Solu-Medrol  He has improved; oxygen saturation stable on 2 L. Jeanne Chang He needs this continuously and given him a new prescription for home oxygen. discussed with pt face to face     #Pneumonia  -Likely gram-positive infection  -Patient with cough shortness of breath and chills for 5 days  -Continue antibiotics vancomycin and cefepime as above. DC on Po abx      # Syncope and collapse. - 3-4 d ago. Says 2/2 coughing fit and he couldn't breathe.    -Monitor on tele. -Seen by cardiology  -Syncope is consistent with cough syncope. Patient was also dehydrated. - hydrated   - checked echo. Normal EF      # HTn  - BP stable      # MICHAEL, Cr 1.8.   - Last was 1.1 in 7/22. Hydrated with  IVF and monitored  BMP  MICHAEL resolved. creatinine improved from 1.8-> 1.1-> 0.7. # DM2  - on lantus, SSI      # Elevated CK,   486. resolved with  IVF . # Hypo Mg  - repleted     # Tobacco Abuse. He quit when he fell 4 days ago. Counseled continued cessation, 2 mg nicotine gum      #Admission for anemia and GI bleed in 2022     #History of cirrhosis       He has improved. Discharged home on oral antibiotics.          Procedures (Please Review Full Report for Details)  N/A    Consults    IP CONSULT TO CARDIOLOGY  IP CONSULT TO PULMONOLOGY  PHARMACY TO DOSE VANCOMYCIN  IP CONSULT TO SPIRITUAL SERVICES  IP CONSULT TO SOCIAL WORK      Physical Exam at Discharge:  /78   Pulse 96   Temp 97.8 °F (36.6 °C) (Oral)   Resp 18   Ht 5' 7.75\" (1.721 m)   Wt 205 lb 8 oz (93.2 kg)   SpO2 94%   BMI 31.48 kg/m²   General: Patient appears chronically ill.   awake, alert, NAD  Skin:  Warm and dry  Neck:  JVD absent. Neck supple  Chest: Diminished breath sounds, mild rhonchi .   Cardiovascular:  RRR ,S1S2 normal  Abdomen:  Soft, non tender, non distended, BS +  Extremities:  No edema.  Right arm decreased erythema  Intact peripheral pulses. Brisk cap refill, < 2 secs  Neuro: non focal    CBC:        Recent Labs     03/01/23  0803 03/03/23  0609   WBC 28.0* 10.6   RBC 4.36 3.82*   HGB 13.4* 12.1*   HCT 41.9 36.2*   MCV 96.2 94.7   RDW 15.2 14.6    195         BMP:         Recent Labs     03/01/23  0803 03/02/23  0803 03/03/23  0609    139 138   K 3.7 4.0 3.6    105 103   CO2 25 26 25   BUN 26* 19 13   CREATININE 1.1 0.7* <0.5*       FASTING LIPID PANEL:        Lab Results   Component Value Date     CHOL 154 07/11/2022     HDL 46 07/11/2022     TRIG 111 07/11/2022        LIVER PROFILE:       Recent Labs     03/01/23  0803   AST 26   ALT 14   BILITOT 0.4   ALKPHOS 89           Cultures  COVID/Influenza: not detected   Blood cultures positive for strep  Wound cultures positive for strep         Radiology  CT ABDOMEN PELVIS W IV CONTRAST Additional Contrast? None   Final Result   1. No focal abnormality in the right lower quadrant.   2. Early findings for cirrhosis.           CT CHEST PULMONARY EMBOLISM W CONTRAST   Final Result   No evidence of pulmonary embolism or acute pulmonary abnormality.           CT LUMBAR SPINE TRAUMA RECONSTRUCTION   Final Result   Unremarkable non-contrast CT of the lumbar spine.           CT CERVICAL SPINE WO CONTRAST   Final Result   No acute abnormality of the cervical spine.           CT HEAD WO  CONTRAST   Final Result   No acute intracranial abnormality. XR RADIUS ULNA RIGHT (2 VIEWS)   Final Result   No evidence of osteomyelitis. XR CHEST PORTABLE   Final Result   No acute cardiopulmonary findings              echo   Summary   Left ventricular systolic function is normal with ejection fraction   estimated at 55-60 %. No regional wall motion abnormalities are noted. There is moderate concentric left ventricular hypertrophy. Normal left ventricular diastolic filling pressure. The right atrium is mildly dilated. Moderate posterior mitral annular calcification is present. Aortic sclerosis no significant aortic stenosis. Trivial tricuspid regurgitation. Unable to obtain SPAP due to lack of tricuspid regurgitation. Epicardiac fat pad is present. 2- 55-60 AV sclerosis, DD1, LVH        Discharge Medications     Medication List        START taking these medications      amoxicillin-clavulanate 875-125 MG per tablet  Commonly known as: AUGMENTIN  Take 1 tablet by mouth 2 times daily for 7 days     predniSONE 10 MG tablet  Commonly known as: DELTASONE  Take 3 tabs a day for 3 days,Then 2 tabs a day for 3 days ,Then 1 tab a day for 3 days. CHANGE how you take these medications      gabapentin 300 MG capsule  Commonly known as: NEURONTIN  What changed: Another medication with the same name was removed. Continue taking this medication, and follow the directions you see here. Lantus SoloStar 100 UNIT/ML injection pen  Generic drug: insulin glargine  INJECT 12 UNITS INTO THE SKIN DAILY WITH SUPPER  What changed:   how much to take  how to take this  when to take this  additional instructions     oxyCODONE 5 MG immediate release tablet  Commonly known as: Sanz Leriche  What changed: Another medication with the same name was removed. Continue taking this medication, and follow the directions you see here.             CONTINUE taking these medications      albuterol sulfate  (90 Base) MCG/ACT inhaler  Commonly known as: PROVENTIL;VENTOLIN;PROAIR  INHALE 2 PUFFS EVERY 6 HOURS AS NEEDED FOR WHEEZING     Alcohol Swabs Pads  USE AS DIRECTED TO TEST AND INSULIN     allopurinol 300 MG tablet  Commonly known as: ZYLOPRIM  TAKE 1 TABLET BY MOUTH DAILY     atorvastatin 40 MG tablet  Commonly known as: LIPITOR  TAKE 1 TABLET BY MOUTH DAILY     Blood Pressure Cuff Misc  Please dispense insurance preference     dilTIAZem 180 MG extended release capsule  Commonly known as: CARDIZEM CD  TAKE 2 CAPSULES ONCE DAILY     DULoxetine 60 MG extended release capsule  Commonly known as: CYMBALTA  TAKE ONE CAPSULE BY MOUTH EVERY DAY     FeroSul 325 (65 Fe) MG tablet  Generic drug: ferrous sulfate  TAKE 1 TABLET BY MOUTH DAILY WITH BREAKFAST     folic acid 1 MG tablet  Commonly known as: FOLVITE  TAKE 1 TABLET BY MOUTH DAILY     FreeStyle Freedom Lite w/Device Kit  1 kit by Does not apply route daily     furosemide 20 MG tablet  Commonly known as: LASIX  TAKE 1 TABLET BY MOUTH DAILY     ipratropium-albuterol 0.5-2.5 (3) MG/3ML Soln nebulizer solution  Commonly known as: DUONEB     metoprolol succinate 50 MG extended release tablet  Commonly known as: TOPROL XL  TAKE ONE TABLET BY MOUTH EVERY DAY     nicotine polacrilex 2 MG gum  Commonly known as: Nicorette  Chew one piece of gum every 3 to 4  hours as needed for smoking cessation. No more than 5 pieces of gum in a day.      ondansetron 4 MG tablet  Commonly known as: ZOFRAN  Take 1 tablet by mouth every 6 hours as needed for Nausea or Vomiting     pantoprazole 40 MG tablet  Commonly known as: PROTONIX  TAKE ONE TABLET BY MOUTH EVERY MORNING BEFORE BREAKFAST     PenTips 31G X 8 MM Misc  Generic drug: Insulin Pen Needle  USE ONCE DAILY WITH INJECTION     thiamine 100 MG tablet  TAKE 1 TABLET BY MOUTH DAILY     Trelegy Ellipta 100-62.5-25 MCG/ACT Aepb inhaler  Generic drug: fluticasone-umeclidin-vilant  Inhale 1 puff into the lungs in the morning. True Metrix Blood Glucose Test strip  Generic drug: blood glucose test strips  USE 1 STRIP IN VITRO ROUTE 2 TIMES DAILY     TRUEplus Lancets 30G Misc  TEST 2 TIMES DAILY     Vitamin D3 Ultra Strength 125 MCG (5000 UT) Caps capsule  Generic drug: vitamin D  Take 1 capsule by mouth daily            STOP taking these medications      OXYGEN               Where to Get Your Medications        These medications were sent to [address] [facility], [address], [address] [address]      Phone: [phone]   amoxicillin-clavulanate 875-125 MG per tablet  predniSONE 10 MG tablet           Discharged in stable condition to home. [facility] home with [facility]. Total time 35 minutes. > 50%  dominated by counseling and coordination of care. Follow Up:   Follow up with PCP in 1 week       Juan De Oliveira MD

## 2023-03-07 RX ORDER — CALCIUM CITRATE/VITAMIN D3 200MG-6.25
TABLET ORAL
Qty: 50 STRIP | Refills: 0 | Status: SHIPPED | OUTPATIENT
Start: 2023-03-07

## 2023-03-07 RX ORDER — PANTOPRAZOLE SODIUM 40 MG/1
TABLET, DELAYED RELEASE ORAL
Qty: 30 TABLET | Refills: 0 | Status: SHIPPED | OUTPATIENT
Start: 2023-03-07

## 2023-03-07 NOTE — TELEPHONE ENCOUNTER
Surendra Brittle is requesting refill(s)   Last OV 1/10/23 (pertaining to medication)  LR 4/6/22 (per medication requested)  Next office visit scheduled or attempted Yes   If no, reason:  3/13/23

## 2023-03-07 NOTE — TELEPHONE ENCOUNTER
Estefania Joya is requesting refill(s)   Last OV 1/10/23 (pertaining to medication)  LR 4/13/22 (per medication requested)  Next office visit scheduled or attempted Yes   If no, reason:  3/13/23

## 2023-03-10 RX ORDER — DILTIAZEM HYDROCHLORIDE 180 MG/1
CAPSULE, COATED, EXTENDED RELEASE ORAL
Qty: 60 CAPSULE | Refills: 2 | Status: SHIPPED | OUTPATIENT
Start: 2023-03-10

## 2023-03-13 ENCOUNTER — OFFICE VISIT (OUTPATIENT)
Dept: FAMILY MEDICINE CLINIC | Age: 68
End: 2023-03-13

## 2023-03-13 VITALS — SYSTOLIC BLOOD PRESSURE: 151 MMHG | OXYGEN SATURATION: 96 % | HEART RATE: 93 BPM | DIASTOLIC BLOOD PRESSURE: 74 MMHG

## 2023-03-13 DIAGNOSIS — K74.60 LIVER CIRRHOSIS SECONDARY TO NASH (HCC): ICD-10-CM

## 2023-03-13 DIAGNOSIS — L03.113 CELLULITIS OF RIGHT UPPER EXTREMITY: ICD-10-CM

## 2023-03-13 DIAGNOSIS — L03.90 SEPSIS DUE TO CELLULITIS (HCC): Primary | ICD-10-CM

## 2023-03-13 DIAGNOSIS — J96.11 CHRONIC RESPIRATORY FAILURE WITH HYPOXIA (HCC): ICD-10-CM

## 2023-03-13 DIAGNOSIS — J41.8 MIXED SIMPLE AND MUCOPURULENT CHRONIC BRONCHITIS (HCC): ICD-10-CM

## 2023-03-13 DIAGNOSIS — J44.1 COPD EXACERBATION (HCC): ICD-10-CM

## 2023-03-13 DIAGNOSIS — K70.30 ALCOHOLIC CIRRHOSIS OF LIVER WITHOUT ASCITES (HCC): ICD-10-CM

## 2023-03-13 DIAGNOSIS — R55 SYNCOPE AND COLLAPSE: ICD-10-CM

## 2023-03-13 DIAGNOSIS — A41.9 SEPSIS DUE TO CELLULITIS (HCC): Primary | ICD-10-CM

## 2023-03-13 DIAGNOSIS — K75.81 LIVER CIRRHOSIS SECONDARY TO NASH (HCC): ICD-10-CM

## 2023-03-13 NOTE — PATIENT INSTRUCTIONS

## 2023-03-13 NOTE — PROGRESS NOTES
Post-Discharge Transitional Care  Follow Up      Marcio Yoder   YOB: 1955    Date of Office Visit:  3/13/2023  Date of Hospital Admission: 2/28/23  Date of Hospital Discharge: 3/3/23  Risk of hospital readmission (high >=14%. Medium >=10%) :Readmission Risk Score: 14.6      Care management risk score Rising risk (score 2-5) and Complex Care (Scores >=6): No Risk Score On File     Non face to face  following discharge, date last encounter closed (first attempt may have been earlier): 03/06/2023    Call initiated 2 business days of discharge: Yes    ASSESSMENT/PLAN:   COPD exacerbation ([address]  -     Misc. Devices MISC; Disp-1 each, R-0, PrintPlease provide with portable oxygen concentrator that patient can carry across his shoulders  Chronic respiratory failure with hypoxia ([address]  -     Misc. Devices MISC; Disp-1 each, R-0, PrintPlease provide with portable oxygen concentrator that patient can carry across his shoulders  Mixed simple and mucopurulent chronic bronchitis ([address]  -     Misc. 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 There is surrounding erythema. However patient states that the arm is actually better. He completed his Augmentin oral antibiotics 3 days ago. He still has epitrochlear and axillary lymphadenopathy. He is not febrile does not appear to be toxic. For soap and water. Triple antibiotic ointment and wrap daily. Signs and symptoms of worsening discussed for which to call or go to the ER. He now is O2 dependent. Is concerned about using a tank because of his episodes of coughing and resulting cough syncope.   He would rather have a portable compressor instead of a tank and reportedly was told by insurance only need to his request this. An order is sent to [employer]. He is O2 dependent from COPD and requires 24/7 oxygen. No infiltrate per CAT scan or chest x-ray no follow-up imaging necessary with lack of infiltrates regular follow-up in July. Medical Decision Making: moderate complexity  No follow-ups on file. Subjective:   HPI:  Follow up of Hospital problems/diagnosis(es): sepsis and cellulitis and pneumonia    Inpatient course: Discharge summary reviewed- see chart. Interval history/Current status: patient was in [facility] 2/18-3/3 for sepsis and cellulitis of his right arm after having a fall where he hit his right arm after passing out due to coughing so hard. He was diagnosed too with pneumonia. He is currently on 2L oxygen too. His arm is still red and there is a lot of yellow discharge of the wound. He states that the wound is looking a lot better now. He states that he cleans his arm with soap and water daily and puts antibiotic ointment in it and wraps it with guaze. He is on 24?7 oxygen now at 2L due to chronic hypoxemia and he has almost fallen multiple times over the portable oxygen tank so he is asking about a small portable oxygen concentrator that can be carries across his shoulders. He contiues to use his concentrator at home. Informed that a script for the concentrator will be sent due to his risk for falls.      Patient Active Problem List   Diagnosis    Hilar adenopathy    GERD (gastroesophageal reflux disease)    Chest pain    Gout    Paresthesia of bilateral legs    Right knee pain    Numbness and tingling of right leg    Osteoarthritis of multiple joints    Supraclavicular fossa fullness    Carpal tunnel syndrome of left wrist    Alcohol abuse    Hyponatremia    Hepatomegaly    Chronic alcoholic hepatitis    Ulnar neuropathy at elbow    Chronic pain    Malignant neoplasm of urinary bladder (HCC)    Mixed simple and mucopurulent chronic bronchitis (HCC)    Ulnar nerve entrapment    Mixed hyperlipidemia    Bladder outlet obstruction    Diabetic ketoacidosis without coma associated with type 2 diabetes mellitus (HCC)    Type 2 diabetes mellitus with hyperglycemia, with long-term current use of insulin (HCC)    Bronchiectasis without complication (HCC)    Onychomycosis    Tinea pedis of both feet    Chronic idiopathic constipation    Alcoholic cirrhosis of liver without ascites (HCC)    Family history of rectal cancer    Rectal bleeding    Polyp of colon    Diverticulosis of intestine without bleeding    Secondary esophageal varices without bleeding (HCC)    Erosive gastritis    Multiple duodenal ulcers    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)    Iron deficiency anemia    AVM (arteriovenous malformation) of small bowel, acquired    Uncontrolled hypertension    Severe claudication (HCC)    Hypercalcemia    MICHAEL (acute kidney injury) (HCC)    Other constipation    Hypokalemia    Primary osteoarthritis of right knee    Venous insufficiency    PAD (peripheral artery disease) (HCC)    Diabetic polyneuropathy associated with type 2 diabetes mellitus (HCC)    Situational anxiety    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 If you have any questions, ask your nurse or doctor.                CHANGE how you take these medications      Lantus SoloStar 100 UNIT/ML injection pen  Generic drug: insulin glargine  INJECT 12 UNITS INTO THE SKIN  DAILY WITH SUPPER  What changed:   how much to take  how to take this  when to take this  additional instructions            CONTINUE taking these medications      albuterol sulfate  (90 Base) MCG/ACT inhaler  Commonly known as: PROVENTIL;VENTOLIN;PROAIR  INHALE 2 PUFFS EVERY 6 HOURS AS NEEDED FOR WHEEZING     Alcohol Swabs Pads  USE AS DIRECTED TO TEST AND INSULIN     allopurinol 300 MG tablet  Commonly known as: ZYLOPRIM  TAKE 1 TABLET BY MOUTH DAILY     atorvastatin 40 MG tablet  Commonly known as: LIPITOR  TAKE 1 TABLET BY MOUTH DAILY     Blood Pressure Cuff Misc  Please dispense insurance preference     dilTIAZem 180 MG extended release capsule  Commonly known as: CARDIZEM CD  TAKE 2 CAPSULES ONCE DAILY     DULoxetine 60 MG extended release capsule  Commonly known as: CYMBALTA  TAKE ONE CAPSULE BY MOUTH EVERY DAY     FeroSul 325 (65 Fe) MG tablet  Generic drug: ferrous sulfate  TAKE 1 TABLET BY MOUTH DAILY WITH BREAKFAST     folic acid 1 MG tablet  Commonly known as: FOLVITE  TAKE 1 TABLET BY MOUTH DAILY     FreeStyle Freedom Lite w/Device Kit  1 kit by Does not apply route daily     furosemide 20 MG tablet  Commonly known as: LASIX  TAKE 1 TABLET BY MOUTH DAILY     gabapentin 300 MG capsule  Commonly known as: NEURONTIN     ipratropium-albuterol 0.5-2.5 (3) MG/3ML Soln nebulizer solution  Commonly known as: DUONEB     metoprolol succinate 50 MG extended release tablet  Commonly known as: TOPROL XL  TAKE ONE TABLET BY MOUTH EVERY DAY     nicotine polacrilex 2 MG gum  Commonly known as: Nicorette  Chew one piece of gum every 3 to 4  hours as needed for smoking cessation. No more than 5 pieces of gum in a day.      ondansetron 4 MG tablet  Commonly known as: ZOFRAN  Take 1 tablet by mouth every 6 hours as needed for Nausea or Vomiting     oxyCODONE 5 MG immediate release tablet  Commonly known as: ROXICODONE     pantoprazole 40 MG tablet  Commonly known as: PROTONIX  TAKE ONE TABLET BY MOUTH EVERY MORNING BEFORE BREAKFAST     PenTips 31G X 8 MM Misc  Generic drug: Insulin Pen Needle  USE ONCE DAILY WITH INJECTION     predniSONE 10 MG tablet  Commonly known as: DELTASONE  Take 3 tabs a day for 3 days,Then 2 tabs a day for 3 days ,Then 1 tab a day for 3 days. thiamine 100 MG tablet  TAKE 1 TABLET BY MOUTH DAILY     Trelegy Ellipta 100-62.5-25 MCG/ACT Aepb inhaler  Generic drug: fluticasone-umeclidin-vilant  Inhale 1 puff into the lungs in the morning. True Metrix Blood Glucose Test strip  Generic drug: blood glucose test strips  USE 1 STRIP IN VITRO ROUTE 2 TIMES DAILY     TRUEplus Lancets 30G Misc  TEST 2 TIMES DAILY     Vitamin D3 Ultra Strength 125 MCG (5000 UT) Caps capsule  Generic drug: vitamin D  Take 1 capsule by mouth daily                Medications marked \"taking\" at this time  Outpatient Medications Marked as Taking for the 3/13/23 encounter (Office Visit) with Nichole Polanco MD   Medication Sig Dispense Refill    dilTIAZem (CARDIZEM CD) 180 MG extended release capsule TAKE 2 CAPSULES ONCE DAILY 60 capsule 2    pantoprazole (PROTONIX) 40 MG tablet TAKE ONE TABLET BY MOUTH EVERY MORNING BEFORE BREAKFAST 30 tablet 0    TRUE METRIX BLOOD GLUCOSE TEST strip USE 1 STRIP IN VITRO ROUTE 2 TIMES DAILY 50 strip 0    predniSONE (DELTASONE) 10 MG tablet Take 3 tabs a day for 3 days,Then 2 tabs a day for 3 days ,Then 1 tab a day for 3 days. 18 tablet 0    gabapentin (NEURONTIN) 300 MG capsule Take 900 mg by mouth 3 times daily.       oxyCODONE (ROXICODONE) 5 MG immediate release tablet Take 10 mg by mouth every 8 hours as needed for Pain.      ipratropium-albuterol (DUONEB) 0.5-2.5 (3) MG/3ML SOLN nebulizer solution Inhale 1 vial into the lungs every 4 hours      furosemide (LASIX) 20 MG tablet TAKE 1 TABLET BY MOUTH DAILY 30 tablet 0    FEROSUL 325 (65 Fe) MG tablet TAKE 1 TABLET BY MOUTH DAILY WITH BREAKFAST 90 tablet 3    albuterol sulfate HFA (PROVENTIL;VENTOLIN;PROAIR) 108 (90 Base) MCG/ACT inhaler INHALE 2 PUFFS EVERY 6 HOURS AS NEEDED FOR WHEEZING 18 g 3    allopurinol (ZYLOPRIM) 300 MG tablet TAKE 1 TABLET BY MOUTH DAILY 90 tablet 3    atorvastatin (LIPITOR) 40 MG tablet TAKE 1 TABLET BY MOUTH DAILY 90 tablet 3    insulin glargine (LANTUS SOLOSTAR) 100 UNIT/ML injection pen INJECT 12 UNITS INTO THE SKIN DAILY WITH SUPPER (Patient taking differently: Inject 25 Units into the skin every evening INJECT 25 UNITS INTO THE SKIN DAILY WITH SUPPER) 15 mL 0    ondansetron (ZOFRAN) 4 MG tablet Take 1 tablet by mouth every 6 hours as needed for Nausea or Vomiting 30 tablet 0    thiamine 100 MG tablet TAKE 1 TABLET BY MOUTH DAILY 100 tablet 0    metoprolol succinate (TOPROL XL) 50 MG extended release tablet TAKE ONE TABLET BY MOUTH EVERY DAY 90 tablet 3    DULoxetine (CYMBALTA) 60 MG extended release capsule TAKE ONE CAPSULE BY MOUTH EVERY DAY 30 capsule 11    nicotine polacrilex (NICORETTE) 2 MG gum Chew one piece of gum every 3 to 4  hours as needed for smoking cessation. No more than 5 pieces of gum in a day. 110 each 0    Alcohol Swabs PADS USE AS DIRECTED TO TEST AND INSULIN 100 each 5    fluticasone-umeclidin-vilant (TRELEGY ELLIPTA) 100-62.5-25 MCG/INH AEPB Inhale 1 puff into the lungs in the morning. 1 each 5    vitamin D (VITAMIN D3 ULTRA STRENGTH) 125 MCG (5000 UT) CAPS capsule Take 1 capsule by mouth daily 90 capsule 3    folic acid (FOLVITE) 1 MG tablet TAKE 1 TABLET BY MOUTH DAILY 30 tablet 5    TRUEplus Lancets 30G MISC TEST 2 TIMES DAILY 100 each 5    PENTIPS 31G X 8 MM MISC USE ONCE DAILY WITH INJECTION 100 each 10    Blood Pressure Monitoring (BLOOD PRESSURE CUFF) MISC Please dispense insurance preference 1 each 0    Blood Glucose Monitoring Suppl (FREESTYLE FREEDOM LITE) w/Device KIT 1 kit by Does not apply route daily 1 kit 0        Medications patient taking as of now reconciled against medications ordered at time of hospital discharge:  Yes    A comprehensive review of systems was negative except for what was noted in the HPI. Objective:    BP (!) 151/74   Pulse 93   SpO2 96%   Physical Exam  Vitals and nursing note reviewed. Constitutional:       General: He is not in acute distress. Appearance: He is well-developed. He is not diaphoretic. HENT:      Head: Normocephalic and atraumatic. Right Ear: External ear normal.      Left Ear: External ear normal.      Nose: Nose normal.   Eyes:      General: Lids are normal. No scleral icterus. Right eye: No discharge. Left eye: No discharge. Pupils: Pupils are equal, round, and reactive to light. Neck:      Thyroid: No thyromegaly. Vascular: No JVD. Cardiovascular:      Rate and Rhythm: Normal rate and regular rhythm. Pulmonary:      Effort: Pulmonary effort is normal. No respiratory distress. Comments: Requiring 2L oxygen. Abdominal:      Palpations: Abdomen is soft. There is no hepatomegaly or splenomegaly. Tenderness: There is no abdominal tenderness. Lymphadenopathy:      Upper Body:      Right upper body: Axillary adenopathy and epitrochlear adenopathy present. Left upper body: Axillary adenopathy and epitrochlear adenopathy present. Skin:     General: Skin is warm and dry. Coloration: Skin is not pale. Findings: Abrasion and erythema present. No rash. Comments: Wound on right forearm is still red on the borders and has yellow exudate   Neurological:      Mental Status: He is oriented to person, place, and time. Psychiatric:         Mood and Affect: Mood normal.         Behavior: Behavior normal.         Thought Content: Thought content normal.         Judgment: Judgment normal.    Physical Exam  Vitals and nursing note reviewed. Constitutional:       General: He is not in acute distress. Appearance: He is well-developed. He is not diaphoretic. HENT:      Head: Normocephalic and atraumatic.       Right Ear: External ear normal.      Left Ear: External ear normal. Nose: Nose normal.   Eyes:      General: Lids are normal. No scleral icterus. Right eye: No discharge. Left eye: No discharge. Pupils: Pupils are equal, round, and reactive to light. Neck:      Thyroid: No thyromegaly. Vascular: No JVD. Cardiovascular:      Rate and Rhythm: Normal rate and regular rhythm. Pulmonary:      Effort: Pulmonary effort is normal. No respiratory distress. Comments: Requiring 2L oxygen. Abdominal:      Palpations: Abdomen is soft. There is no hepatomegaly or splenomegaly. Tenderness: There is no abdominal tenderness. Lymphadenopathy:      Upper Body:      Right upper body: Axillary adenopathy and epitrochlear adenopathy present. Left upper body: Axillary adenopathy and epitrochlear adenopathy present. Skin:     General: Skin is warm and dry. Coloration: Skin is not pale. Findings: Abrasion and erythema present. No rash. Comments: Wound on right forearm is still red on the borders and has yellow exudate   Neurological:      Mental Status: He is oriented to person, place, and time. Psychiatric:         Mood and Affect: Mood normal.         Behavior: Behavior normal.         Thought Content: Thought content normal.         Judgment: Judgment normal.      An electronic signature was used to authenticate this note. --Claudio Olivares RN        I, Dr. Tomasa Brady, personally performed the services described in this documentation, as scribed by the above signed scribe in my presence, and it is both accurate and complete. I agree with the ROS and Past Histories independently gathered by the clinical support staff and the remaining scribed note accurately describes my personal service to the patient.       3/13/2023    10:51 AM

## 2023-03-15 RX ORDER — FOLIC ACID 1 MG/1
TABLET ORAL
Qty: 30 TABLET | Refills: 4 | Status: SHIPPED | OUTPATIENT
Start: 2023-03-15

## 2023-03-21 ENCOUNTER — OFFICE VISIT (OUTPATIENT)
Dept: PULMONOLOGY | Age: 68
End: 2023-03-21
Payer: MEDICARE

## 2023-03-21 VITALS
DIASTOLIC BLOOD PRESSURE: 82 MMHG | BODY MASS INDEX: 31.07 KG/M2 | WEIGHT: 205 LBS | HEART RATE: 87 BPM | OXYGEN SATURATION: 97 % | RESPIRATION RATE: 16 BRPM | HEIGHT: 68 IN | SYSTOLIC BLOOD PRESSURE: 134 MMHG

## 2023-03-21 DIAGNOSIS — J44.9 CHRONIC OBSTRUCTIVE PULMONARY DISEASE, UNSPECIFIED COPD TYPE (HCC): Primary | ICD-10-CM

## 2023-03-21 PROCEDURE — 1123F ACP DISCUSS/DSCN MKR DOCD: CPT | Performed by: INTERNAL MEDICINE

## 2023-03-21 PROCEDURE — G8484 FLU IMMUNIZE NO ADMIN: HCPCS | Performed by: INTERNAL MEDICINE

## 2023-03-21 PROCEDURE — 99213 OFFICE O/P EST LOW 20 MIN: CPT | Performed by: INTERNAL MEDICINE

## 2023-03-21 PROCEDURE — 1036F TOBACCO NON-USER: CPT | Performed by: INTERNAL MEDICINE

## 2023-03-21 PROCEDURE — G8417 CALC BMI ABV UP PARAM F/U: HCPCS | Performed by: INTERNAL MEDICINE

## 2023-03-21 PROCEDURE — 3075F SYST BP GE 130 - 139MM HG: CPT | Performed by: INTERNAL MEDICINE

## 2023-03-21 PROCEDURE — G8427 DOCREV CUR MEDS BY ELIG CLIN: HCPCS | Performed by: INTERNAL MEDICINE

## 2023-03-21 PROCEDURE — 3023F SPIROM DOC REV: CPT | Performed by: INTERNAL MEDICINE

## 2023-03-21 PROCEDURE — 1111F DSCHRG MED/CURRENT MED MERGE: CPT | Performed by: INTERNAL MEDICINE

## 2023-03-21 PROCEDURE — 3017F COLORECTAL CA SCREEN DOC REV: CPT | Performed by: INTERNAL MEDICINE

## 2023-03-21 PROCEDURE — 3079F DIAST BP 80-89 MM HG: CPT | Performed by: INTERNAL MEDICINE

## 2023-03-21 RX ORDER — OXYCODONE HYDROCHLORIDE 10 MG/1
TABLET ORAL
COMMUNITY
Start: 2023-03-14

## 2023-03-21 RX ORDER — FUROSEMIDE 20 MG/1
TABLET ORAL
Qty: 30 TABLET | Refills: 2 | Status: SHIPPED | OUTPATIENT
Start: 2023-03-21

## 2023-03-21 NOTE — PROGRESS NOTES
extremity edema. Pulmonary/Chest: Few wheezes. No rhonchi. No rales. + decreased breath sounds. No accessory muscle usage or stridor. Musculoskeletal: No cyanosis. No clubbing. Skin: right forearm wound is dressed, healing. I personally took down dssg today   Psychiatric: Normal mood and affect. Neurologic: speech fluent, alert and oriented, strength symmetric        Data  CTPA 2/28/23  Pulmonary Arteries: Pulmonary arteries are adequately opacified for   evaluation. No evidence of intraluminal filling defect to suggest pulmonary   embolism. Main pulmonary artery is normal in caliber. Mediastinum: Atherosclerotic change of the aorta and the great vessels. Calcified mediastinal lymph nodes. Heart size is within normal limits. Lungs/pleura: Mild emphysema. Mild basilar atelectasis. No consolidation. Trachea and bronchi are patent. Upper Abdomen: Liver contour is slightly nodular. Soft Tissues/Bones: Spondylosis           Impression   No evidence of pulmonary embolism or acute pulmonary abnormality. LDCT 11/2/22  No change in the 2 mm solid bilateral pulmonary nodules. A new 3 mm solid   right upper lobe pulmonary nodule has developed.          L Hilar TBNA 2-8-11 - no malignant cells; FLOW normal polyclonal B/T cells    PFT 2-10-11 FEV1 2L 62% % DLCO 100%  PFT Oct 2021 FEV1 1.42 L 46%  DLCO 43%    Assessment:  Severe COPD      Not addressed today   Peripheral vascular disease s/p bypass surgery  Obesity  Cirrhosis  Tobacco abuse, 1-3 ppd X 42 years, average about 2 ppd for 80 pack year hx, just quit 3 weeks ago when hospitalized    Plan:   Trelegy 100 daily   PRN albuterol  LDCT for LCS in Feb 2024, set up at future visit   See me in 3 months   Tried to Qualify for POC today, [facility], patient is tobacco free, mobile within his own home -- did not qualify in office today     Taina's spouse

## 2023-03-27 ENCOUNTER — APPOINTMENT (OUTPATIENT)
Dept: GENERAL RADIOLOGY | Age: 68
End: 2023-03-27
Payer: MEDICARE

## 2023-03-27 ENCOUNTER — HOSPITAL ENCOUNTER (INPATIENT)
Age: 68
LOS: 4 days | Discharge: HOME | End: 2023-03-31
Attending: EMERGENCY MEDICINE | Admitting: INTERNAL MEDICINE
Payer: MEDICARE

## 2023-03-27 ENCOUNTER — TELEPHONE (OUTPATIENT)
Dept: FAMILY MEDICINE CLINIC | Age: 68
End: 2023-03-27

## 2023-03-27 DIAGNOSIS — L03.115 CELLULITIS OF RIGHT LOWER EXTREMITY: Primary | ICD-10-CM

## 2023-03-27 PROBLEM — E11.9 TYPE 2 DIABETES MELLITUS WITHOUT COMPLICATION, WITH LONG-TERM CURRENT USE OF INSULIN (HCC): Status: ACTIVE | Noted: 2018-02-18

## 2023-03-27 PROBLEM — I73.9 PVD (PERIPHERAL VASCULAR DISEASE) (HCC): Status: ACTIVE | Noted: 2020-11-03

## 2023-03-27 LAB
ALBUMIN SERPL-MCNC: 3.1 G/DL (ref 3.4–5)
ALBUMIN/GLOB SERPL: 0.7 {RATIO} (ref 1.1–2.2)
ALP SERPL-CCNC: 202 U/L (ref 40–129)
ALT SERPL-CCNC: 20 U/L (ref 10–40)
ANION GAP SERPL CALCULATED.3IONS-SCNC: 11 MMOL/L (ref 3–16)
AST SERPL-CCNC: 29 U/L (ref 15–37)
BASOPHILS # BLD: 0.1 K/UL (ref 0–0.2)
BASOPHILS NFR BLD: 0.6 %
BILIRUB SERPL-MCNC: 0.5 MG/DL (ref 0–1)
BUN SERPL-MCNC: 12 MG/DL (ref 7–20)
CALCIUM SERPL-MCNC: 9.1 MG/DL (ref 8.3–10.6)
CHLORIDE SERPL-SCNC: 95 MMOL/L (ref 99–110)
CO2 SERPL-SCNC: 30 MMOL/L (ref 21–32)
CREAT SERPL-MCNC: 0.9 MG/DL (ref 0.8–1.3)
DEPRECATED RDW RBC AUTO: 14.3 % (ref 12.4–15.4)
EOSINOPHIL # BLD: 0.1 K/UL (ref 0–0.6)
EOSINOPHIL NFR BLD: 1.3 %
GFR SERPLBLD CREATININE-BSD FMLA CKD-EPI: >60 ML/MIN/{1.73_M2}
GLUCOSE BLD-MCNC: 109 MG/DL (ref 70–99)
GLUCOSE BLD-MCNC: 109 MG/DL (ref 70–99)
GLUCOSE BLD-MCNC: 125 MG/DL (ref 70–99)
GLUCOSE SERPL-MCNC: 144 MG/DL (ref 70–99)
HCT VFR BLD AUTO: 35.2 % (ref 40.5–52.5)
HGB BLD-MCNC: 12 G/DL (ref 13.5–17.5)
LACTATE BLDV-SCNC: 1.9 MMOL/L (ref 0.4–1.9)
LYMPHOCYTES # BLD: 1 K/UL (ref 1–5.1)
LYMPHOCYTES NFR BLD: 10.1 %
MAGNESIUM SERPL-MCNC: 1.3 MG/DL (ref 1.8–2.4)
MCH RBC QN AUTO: 31.3 PG (ref 26–34)
MCHC RBC AUTO-ENTMCNC: 34.2 G/DL (ref 31–36)
MCV RBC AUTO: 91.5 FL (ref 80–100)
MONOCYTES # BLD: 1.1 K/UL (ref 0–1.3)
MONOCYTES NFR BLD: 11.3 %
NEUTROPHILS # BLD: 7.5 K/UL (ref 1.7–7.7)
NEUTROPHILS NFR BLD: 76.7 %
PERFORMED ON: ABNORMAL
PLATELET # BLD AUTO: 247 K/UL (ref 135–450)
PMV BLD AUTO: 7.5 FL (ref 5–10.5)
POTASSIUM SERPL-SCNC: 3.2 MMOL/L (ref 3.5–5.1)
PROCALCITONIN SERPL IA-MCNC: 0.32 NG/ML (ref 0–0.15)
PROT SERPL-MCNC: 7.6 G/DL (ref 6.4–8.2)
RBC # BLD AUTO: 3.85 M/UL (ref 4.2–5.9)
SARS-COV-2 RDRP RESP QL NAA+PROBE: NOT DETECTED
SODIUM SERPL-SCNC: 136 MMOL/L (ref 136–145)
WBC # BLD AUTO: 9.8 K/UL (ref 4–11)

## 2023-03-27 PROCEDURE — 2580000003 HC RX 258: Performed by: EMERGENCY MEDICINE

## 2023-03-27 PROCEDURE — 96366 THER/PROPH/DIAG IV INF ADDON: CPT

## 2023-03-27 PROCEDURE — 99285 EMERGENCY DEPT VISIT HI MDM: CPT

## 2023-03-27 PROCEDURE — 36415 COLL VENOUS BLD VENIPUNCTURE: CPT

## 2023-03-27 PROCEDURE — 73630 X-RAY EXAM OF FOOT: CPT

## 2023-03-27 PROCEDURE — 83735 ASSAY OF MAGNESIUM: CPT

## 2023-03-27 PROCEDURE — 87635 SARS-COV-2 COVID-19 AMP PRB: CPT

## 2023-03-27 PROCEDURE — 6370000000 HC RX 637 (ALT 250 FOR IP): Performed by: NURSE PRACTITIONER

## 2023-03-27 PROCEDURE — 6360000002 HC RX W HCPCS: Performed by: NURSE PRACTITIONER

## 2023-03-27 PROCEDURE — 2580000003 HC RX 258: Performed by: NURSE PRACTITIONER

## 2023-03-27 PROCEDURE — 84145 PROCALCITONIN (PCT): CPT

## 2023-03-27 PROCEDURE — 2700000000 HC OXYGEN THERAPY PER DAY

## 2023-03-27 PROCEDURE — 6370000000 HC RX 637 (ALT 250 FOR IP): Performed by: INTERNAL MEDICINE

## 2023-03-27 PROCEDURE — 96365 THER/PROPH/DIAG IV INF INIT: CPT

## 2023-03-27 PROCEDURE — 99223 1ST HOSP IP/OBS HIGH 75: CPT

## 2023-03-27 PROCEDURE — 6360000002 HC RX W HCPCS: Performed by: EMERGENCY MEDICINE

## 2023-03-27 PROCEDURE — 85025 COMPLETE CBC W/AUTO DIFF WBC: CPT

## 2023-03-27 PROCEDURE — 83605 ASSAY OF LACTIC ACID: CPT

## 2023-03-27 PROCEDURE — 73590 X-RAY EXAM OF LOWER LEG: CPT

## 2023-03-27 PROCEDURE — 1200000000 HC SEMI PRIVATE

## 2023-03-27 PROCEDURE — 83036 HEMOGLOBIN GLYCOSYLATED A1C: CPT

## 2023-03-27 PROCEDURE — 80053 COMPREHEN METABOLIC PANEL: CPT

## 2023-03-27 PROCEDURE — 94640 AIRWAY INHALATION TREATMENT: CPT

## 2023-03-27 PROCEDURE — 87040 BLOOD CULTURE FOR BACTERIA: CPT

## 2023-03-27 RX ORDER — ATORVASTATIN CALCIUM 40 MG/1
40 TABLET, FILM COATED ORAL DAILY
Status: DISCONTINUED | OUTPATIENT
Start: 2023-03-28 | End: 2023-03-31 | Stop reason: HOSPADM

## 2023-03-27 RX ORDER — DEXTROSE MONOHYDRATE 100 MG/ML
INJECTION, SOLUTION INTRAVENOUS CONTINUOUS PRN
Status: DISCONTINUED | OUTPATIENT
Start: 2023-03-27 | End: 2023-03-31 | Stop reason: HOSPADM

## 2023-03-27 RX ORDER — POTASSIUM CHLORIDE 20 MEQ/1
40 TABLET, EXTENDED RELEASE ORAL PRN
Status: DISCONTINUED | OUTPATIENT
Start: 2023-03-27 | End: 2023-03-31 | Stop reason: HOSPADM

## 2023-03-27 RX ORDER — ONDANSETRON 4 MG/1
4 TABLET, ORALLY DISINTEGRATING ORAL EVERY 8 HOURS PRN
Status: DISCONTINUED | OUTPATIENT
Start: 2023-03-27 | End: 2023-03-31 | Stop reason: HOSPADM

## 2023-03-27 RX ORDER — ACETAMINOPHEN 325 MG/1
650 TABLET ORAL EVERY 6 HOURS PRN
Status: DISCONTINUED | OUTPATIENT
Start: 2023-03-27 | End: 2023-03-31 | Stop reason: HOSPADM

## 2023-03-27 RX ORDER — FERROUS SULFATE 325(65) MG
325 TABLET ORAL DAILY
Status: DISCONTINUED | OUTPATIENT
Start: 2023-03-28 | End: 2023-03-31 | Stop reason: HOSPADM

## 2023-03-27 RX ORDER — SODIUM CHLORIDE 0.9 % (FLUSH) 0.9 %
5-40 SYRINGE (ML) INJECTION EVERY 12 HOURS SCHEDULED
Status: DISCONTINUED | OUTPATIENT
Start: 2023-03-27 | End: 2023-03-31 | Stop reason: HOSPADM

## 2023-03-27 RX ORDER — MAGNESIUM SULFATE IN WATER 40 MG/ML
2000 INJECTION, SOLUTION INTRAVENOUS PRN
Status: DISCONTINUED | OUTPATIENT
Start: 2023-03-27 | End: 2023-03-31 | Stop reason: HOSPADM

## 2023-03-27 RX ORDER — METOPROLOL SUCCINATE 50 MG/1
50 TABLET, EXTENDED RELEASE ORAL DAILY
Status: DISCONTINUED | OUTPATIENT
Start: 2023-03-28 | End: 2023-03-31 | Stop reason: HOSPADM

## 2023-03-27 RX ORDER — ENOXAPARIN SODIUM 100 MG/ML
40 INJECTION SUBCUTANEOUS EVERY EVENING
Status: DISCONTINUED | OUTPATIENT
Start: 2023-03-27 | End: 2023-03-31 | Stop reason: HOSPADM

## 2023-03-27 RX ORDER — SODIUM CHLORIDE 9 MG/ML
INJECTION, SOLUTION INTRAVENOUS CONTINUOUS
Status: ACTIVE | OUTPATIENT
Start: 2023-03-27 | End: 2023-03-27

## 2023-03-27 RX ORDER — DILTIAZEM HYDROCHLORIDE 180 MG/1
360 CAPSULE, COATED, EXTENDED RELEASE ORAL DAILY
Status: DISCONTINUED | OUTPATIENT
Start: 2023-03-28 | End: 2023-03-31 | Stop reason: HOSPADM

## 2023-03-27 RX ORDER — INSULIN GLARGINE 100 [IU]/ML
12 INJECTION, SOLUTION SUBCUTANEOUS EVERY EVENING
Status: DISCONTINUED | OUTPATIENT
Start: 2023-03-28 | End: 2023-03-27

## 2023-03-27 RX ORDER — INSULIN GLARGINE 100 [IU]/ML
12 INJECTION, SOLUTION SUBCUTANEOUS EVERY EVENING
Status: DISCONTINUED | OUTPATIENT
Start: 2023-03-27 | End: 2023-03-31 | Stop reason: HOSPADM

## 2023-03-27 RX ORDER — SODIUM CHLORIDE 0.9 % (FLUSH) 0.9 %
5-40 SYRINGE (ML) INJECTION PRN
Status: DISCONTINUED | OUTPATIENT
Start: 2023-03-27 | End: 2023-03-31 | Stop reason: HOSPADM

## 2023-03-27 RX ORDER — ACETAMINOPHEN 650 MG/1
650 SUPPOSITORY RECTAL EVERY 6 HOURS PRN
Status: DISCONTINUED | OUTPATIENT
Start: 2023-03-27 | End: 2023-03-31 | Stop reason: HOSPADM

## 2023-03-27 RX ORDER — SODIUM CHLORIDE 9 MG/ML
INJECTION, SOLUTION INTRAVENOUS PRN
Status: DISCONTINUED | OUTPATIENT
Start: 2023-03-27 | End: 2023-03-31 | Stop reason: HOSPADM

## 2023-03-27 RX ORDER — BUDESONIDE AND FORMOTEROL FUMARATE DIHYDRATE 160; 4.5 UG/1; UG/1
2 AEROSOL RESPIRATORY (INHALATION) 2 TIMES DAILY
Status: DISCONTINUED | OUTPATIENT
Start: 2023-03-27 | End: 2023-03-31 | Stop reason: HOSPADM

## 2023-03-27 RX ORDER — IPRATROPIUM BROMIDE AND ALBUTEROL SULFATE 2.5; .5 MG/3ML; MG/3ML
1 SOLUTION RESPIRATORY (INHALATION) EVERY 4 HOURS PRN
Status: DISCONTINUED | OUTPATIENT
Start: 2023-03-27 | End: 2023-03-31 | Stop reason: HOSPADM

## 2023-03-27 RX ORDER — INSULIN LISPRO 100 [IU]/ML
0-4 INJECTION, SOLUTION INTRAVENOUS; SUBCUTANEOUS
Status: DISCONTINUED | OUTPATIENT
Start: 2023-03-27 | End: 2023-03-31 | Stop reason: HOSPADM

## 2023-03-27 RX ORDER — POLYETHYLENE GLYCOL 3350 17 G/17G
17 POWDER, FOR SOLUTION ORAL DAILY PRN
Status: DISCONTINUED | OUTPATIENT
Start: 2023-03-27 | End: 2023-03-31 | Stop reason: HOSPADM

## 2023-03-27 RX ORDER — MAGNESIUM SULFATE IN WATER 40 MG/ML
2000 INJECTION, SOLUTION INTRAVENOUS ONCE
Status: COMPLETED | OUTPATIENT
Start: 2023-03-27 | End: 2023-03-27

## 2023-03-27 RX ORDER — POTASSIUM CHLORIDE 7.45 MG/ML
10 INJECTION INTRAVENOUS PRN
Status: DISCONTINUED | OUTPATIENT
Start: 2023-03-27 | End: 2023-03-31 | Stop reason: HOSPADM

## 2023-03-27 RX ORDER — FUROSEMIDE 20 MG/1
20 TABLET ORAL DAILY
Status: DISCONTINUED | OUTPATIENT
Start: 2023-03-28 | End: 2023-03-31 | Stop reason: HOSPADM

## 2023-03-27 RX ORDER — DULOXETIN HYDROCHLORIDE 60 MG/1
60 CAPSULE, DELAYED RELEASE ORAL DAILY
Status: DISCONTINUED | OUTPATIENT
Start: 2023-03-28 | End: 2023-03-31 | Stop reason: HOSPADM

## 2023-03-27 RX ORDER — FOLIC ACID 1 MG/1
1000 TABLET ORAL DAILY
Status: DISCONTINUED | OUTPATIENT
Start: 2023-03-28 | End: 2023-03-31 | Stop reason: HOSPADM

## 2023-03-27 RX ORDER — INSULIN GLARGINE 100 [IU]/ML
25 INJECTION, SOLUTION SUBCUTANEOUS EVERY EVENING
Status: DISCONTINUED | OUTPATIENT
Start: 2023-03-27 | End: 2023-03-27

## 2023-03-27 RX ORDER — ONDANSETRON 2 MG/ML
4 INJECTION INTRAMUSCULAR; INTRAVENOUS EVERY 6 HOURS PRN
Status: DISCONTINUED | OUTPATIENT
Start: 2023-03-27 | End: 2023-03-31 | Stop reason: HOSPADM

## 2023-03-27 RX ORDER — ALLOPURINOL 300 MG/1
300 TABLET ORAL DAILY
Status: DISCONTINUED | OUTPATIENT
Start: 2023-03-28 | End: 2023-03-31 | Stop reason: HOSPADM

## 2023-03-27 RX ORDER — OXYCODONE HYDROCHLORIDE 5 MG/1
10 TABLET ORAL EVERY 8 HOURS PRN
Status: DISCONTINUED | OUTPATIENT
Start: 2023-03-27 | End: 2023-03-31 | Stop reason: HOSPADM

## 2023-03-27 RX ORDER — GABAPENTIN 300 MG/1
900 CAPSULE ORAL 3 TIMES DAILY
Status: DISCONTINUED | OUTPATIENT
Start: 2023-03-27 | End: 2023-03-31 | Stop reason: HOSPADM

## 2023-03-27 RX ORDER — LANOLIN ALCOHOL/MO/W.PET/CERES
100 CREAM (GRAM) TOPICAL DAILY
Status: DISCONTINUED | OUTPATIENT
Start: 2023-03-28 | End: 2023-03-31 | Stop reason: HOSPADM

## 2023-03-27 RX ORDER — ALBUTEROL SULFATE 90 UG/1
2 AEROSOL, METERED RESPIRATORY (INHALATION) EVERY 4 HOURS PRN
Status: DISCONTINUED | OUTPATIENT
Start: 2023-03-27 | End: 2023-03-27

## 2023-03-27 RX ORDER — ALBUTEROL SULFATE 90 UG/1
2 AEROSOL, METERED RESPIRATORY (INHALATION) 4 TIMES DAILY
Status: DISCONTINUED | OUTPATIENT
Start: 2023-03-27 | End: 2023-03-28

## 2023-03-27 RX ORDER — SODIUM CHLORIDE 9 MG/ML
INJECTION, SOLUTION INTRAVENOUS CONTINUOUS
Status: DISCONTINUED | OUTPATIENT
Start: 2023-03-27 | End: 2023-03-27

## 2023-03-27 RX ORDER — INSULIN LISPRO 100 [IU]/ML
0-4 INJECTION, SOLUTION INTRAVENOUS; SUBCUTANEOUS NIGHTLY
Status: DISCONTINUED | OUTPATIENT
Start: 2023-03-27 | End: 2023-03-31 | Stop reason: HOSPADM

## 2023-03-27 RX ORDER — PANTOPRAZOLE SODIUM 40 MG/1
40 TABLET, DELAYED RELEASE ORAL
Status: DISCONTINUED | OUTPATIENT
Start: 2023-03-28 | End: 2023-03-31 | Stop reason: HOSPADM

## 2023-03-27 RX ADMIN — VANCOMYCIN HYDROCHLORIDE 1750 MG: 1 INJECTION, POWDER, LYOPHILIZED, FOR SOLUTION INTRAVENOUS at 12:37

## 2023-03-27 RX ADMIN — ENOXAPARIN SODIUM 40 MG: 100 INJECTION SUBCUTANEOUS at 18:37

## 2023-03-27 RX ADMIN — OXYCODONE HYDROCHLORIDE 10 MG: 5 TABLET ORAL at 17:58

## 2023-03-27 RX ADMIN — SODIUM CHLORIDE: 9 INJECTION, SOLUTION INTRAVENOUS at 16:25

## 2023-03-27 RX ADMIN — CEFEPIME 2000 MG: 2 INJECTION, POWDER, FOR SOLUTION INTRAVENOUS at 14:02

## 2023-03-27 RX ADMIN — INSULIN GLARGINE 12 UNITS: 100 INJECTION, SOLUTION SUBCUTANEOUS at 22:04

## 2023-03-27 RX ADMIN — GABAPENTIN 900 MG: 300 CAPSULE ORAL at 22:04

## 2023-03-27 RX ADMIN — Medication 10 ML: at 22:05

## 2023-03-27 RX ADMIN — Medication 2 PUFF: at 19:15

## 2023-03-27 RX ADMIN — MAGNESIUM SULFATE HEPTAHYDRATE 2000 MG: 40 INJECTION, SOLUTION INTRAVENOUS at 16:26

## 2023-03-27 RX ADMIN — Medication 2 PUFF: at 21:19

## 2023-03-27 ASSESSMENT — PAIN SCALES - GENERAL
PAINLEVEL_OUTOF10: 8

## 2023-03-27 ASSESSMENT — PAIN - FUNCTIONAL ASSESSMENT: PAIN_FUNCTIONAL_ASSESSMENT: 0-10

## 2023-03-27 ASSESSMENT — PAIN DESCRIPTION - LOCATION: LOCATION: LEG

## 2023-03-27 ASSESSMENT — LIFESTYLE VARIABLES
HOW MANY STANDARD DRINKS CONTAINING ALCOHOL DO YOU HAVE ON A TYPICAL DAY: 1 OR 2
HOW OFTEN DO YOU HAVE A DRINK CONTAINING ALCOHOL: MONTHLY OR LESS
HOW MANY STANDARD DRINKS CONTAINING ALCOHOL DO YOU HAVE ON A TYPICAL DAY: 1 OR 2
HOW OFTEN DO YOU HAVE A DRINK CONTAINING ALCOHOL: MONTHLY OR LESS

## 2023-03-27 ASSESSMENT — PAIN DESCRIPTION - ORIENTATION: ORIENTATION: RIGHT

## 2023-03-27 NOTE — H&P
COLONOSCOPY N/A 07/23/2018    COLONOSCOPY WITH BIOPSY performed by Nancy Fernandez MD at [address] [address]    COLONOSCOPY N/A 07/23/2018    COLONOSCOPY POLYPECTOMY SNARE/COLD BIOPSY performed by Nancy Fernandez MD at [facility]  05/10/2022    COLONOSCOPY N/A 05/10/2022    COLON W/ANES. performed by Suhail Viramontes MD at [address] [place] [address]  02/04/2015    Urethral Dilatation, Resection of Bladder Tumor    CYSTOSCOPY  02/24/2016    fulgeration of bladder tumor    ELBOW SURGERY Left     ENDOSCOPY, SMALL BOWEL N/A 02/20/2019    BOWEL SMALL CAPSULE ENDOSCOPY performed by Nancy Fernandez MD at [address] [place] [address]  05/06/2014    cystoscopy, bladder biopsy    OTHER SURGICAL HISTORY  09/09/2015    cystoscopy, urethral dilatation, bladder tumor follow up    UPPER GASTROINTESTINAL ENDOSCOPY N/A 07/23/2018    EGD BIOPSY performed by Nancy Fernandez MD at [place] [address] 05/09/2022    EGD BIOPSY performed by Suhail Viramontes MD at [address] [facility]       Medications Prior to Admission:    Prior to Admission medications    Medication Sig Start Date End Date Taking? Authorizing Provider   furosemide (LASIX) 20 MG tablet TAKE 1 TABLET BY MOUTH DAILY 3/21/23   NAYELY Callahan CNP   oxyCODONE HCl (OXY-IR) 10 MG immediate release tablet  3/14/23   Historical Provider, MD   folic acid (FOLVITE) 1 MG tablet TAKE 1 TABLET BY MOUTH DAILY 3/15/23   Yuridia Pan MD   Misc.  Devices MISC Please provide with portable oxygen concentrator that patient can carry across his shoulders 3/13/23   Yuridia Pan MD   dilTIAZem [facility] CD) 180 MG extended release capsule TAKE 2 CAPSULES ONCE DAILY 3/10/23   NAYELY Callahan CNP   pantoprazole (PROTONIX) 40 MG tablet TAKE ONE TABLET BY MOUTH EVERY MORNING BEFORE BREAKFAST 3/7/23   Yuridia Pan MD hematuria   Musculoskeletal: +right lower extremity pain and swelling   Skin: +right lower extremity cellulitis   Neurological: Negative for syncope   Hematological: Negative for adenopathy   Psychiatric/Behavorial: Negative for anxiety    PHYSICAL EXAM:    BP (!) 121/56   Pulse 84   Temp 99 °F (37.2 °C) (Oral)   Resp 18   Ht 5' 7\" (1.702 m)   Wt 205 lb (93 kg)   SpO2 99%   BMI 32.11 kg/m²     Gen: No distress. Alert. Pleasant male   Eyes: PERRL. No sclera icterus. No conjunctival injection. Neck: No JVD. No Carotid Bruit. Trachea midline. Resp: No accessory muscle use. No crackles. No wheezes. No rhonchi. +Diminished breath sounds bilaterally   CV: Regular rate. Regular rhythm. No murmur. No rub. +Right lower extremity edema. Peripheral Pulses: +2 palpable, equal bilaterally   GI: Non-tender. Non-distended. No masses. No organomegaly. Normal bowel sounds. No hernia. Skin: Warm and dry. No nodule on exposed extremities. No rash on exposed extremities. +right lower extremity with edema, erythema, warmth extending from toes up into the shins with some streaking. +healing laceration on great toe, +healing large right upper extremity lesion   M/S: No cyanosis. No joint deformity. No clubbing. Neuro: Awake. Grossly nonfocal    Psych: Oriented x 3. No anxiety or agitation.      Lab Results   Component Value Date    WBC 9.8 03/27/2023    HGB 12.0 (L) 03/27/2023    HCT 35.2 (L) 03/27/2023    MCV 91.5 03/27/2023     03/27/2023     Lab Results   Component Value Date     03/27/2023    K 3.2 (L) 03/27/2023    CL 95 (L) 03/27/2023    CO2 30 03/27/2023    BUN 12 03/27/2023    CREATININE 0.9 03/27/2023    GLUCOSE 144 (H) 03/27/2023    CALCIUM 9.1 03/27/2023    PROT 7.6 03/27/2023    LABALBU 3.1 (L) 03/27/2023    BILITOT 0.5 03/27/2023    ALKPHOS 202 (H) 03/27/2023    AST 29 03/27/2023    ALT 20 03/27/2023    LABGLOM >60 03/27/2023    GFRAA >60 07/11/2022    AGRATIO 0.7 (L) 03/27/2023    GLOB 3.4

## 2023-03-27 NOTE — PROGRESS NOTES
RT Inhaler-Nebulizer Bronchodilator Protocol Note    There is a bronchodilator order in the chart from a provider indicating to follow the RT Bronchodilator Protocol and there is an Initiate RT Inhaler-Nebulizer Bronchodilator Protocol order as well (see protocol at bottom of note). CXR Findings:  No results found. The findings from the last RT Protocol Assessment were as follows:   History Pulmonary Disease: Chronic pulmonary disease  Respiratory Pattern: Regular pattern and RR 12-20 bpm  Breath Sounds: Slightly diminished and/or crackles  Cough: Strong, spontaneous, non-productive  Indication for Bronchodilator Therapy: Decreased or absent breath sounds  Bronchodilator Assessment Score: 4    Aerosolized bronchodilator medication orders have been revised according to the RT Inhaler-Nebulizer Bronchodilator Protocol below. Respiratory Therapist to perform RT Therapy Protocol Assessment initially then follow the protocol. Repeat RT Therapy Protocol Assessment PRN for score 0-3 or on second treatment, BID, and PRN for scores above 3. No Indications - adjust the frequency to every 6 hours PRN wheezing or bronchospasm, if no treatments needed after 48 hours then discontinue using Per Protocol order mode. If indication present, adjust the RT bronchodilator orders based on the Bronchodilator Assessment Score as indicated below. Use Inhaler orders unless patient has one or more of the following: on home nebulizer, not able to hold breath for 10 seconds, is not alert and oriented, cannot activate and use MDI correctly, or respiratory rate 25 breaths per minute or more, then use the equivalent nebulizer order(s) with same Frequency and PRN reasons based on the score. If a patient is on this medication at home then do not decrease Frequency below that used at home.     0-3 - enter or revise RT bronchodilator order(s) to equivalent RT Bronchodilator order with Frequency of every 4 hours PRN for wheezing or increased work of breathing using Per Protocol order mode. 4-6 - enter or revise RT Bronchodilator order(s) to two equivalent RT bronchodilator orders with one order with BID Frequency and one order with Frequency of every 4 hours PRN wheezing or increased work of breathing using Per Protocol order mode. 7-10 - enter or revise RT Bronchodilator order(s) to two equivalent RT bronchodilator orders with one order with TID Frequency and one order with Frequency of every 4 hours PRN wheezing or increased work of breathing using Per Protocol order mode. 11-13 - enter or revise RT Bronchodilator order(s) to one equivalent RT bronchodilator order with QID Frequency and an Albuterol order with Frequency of every 4 hours PRN wheezing or increased work of breathing using Per Protocol order mode. Greater than 13 - enter or revise RT Bronchodilator order(s) to one equivalent RT bronchodilator order with every 4 hours Frequency and an Albuterol order with Frequency of every 2 hours PRN wheezing or increased work of breathing using Per Protocol order mode. PATIENT WILL BENEFIT FROM TREATMENTS.      Electronically signed by Damian Gomes RCP on 3/27/2023 at 7:20 PM

## 2023-03-27 NOTE — PLAN OF CARE
Admit from [place].  Orab    RLE cellulitis  Recent + strep bacteremia with RUE cellulitis   Vancomycin/Cefepime  Venous US RLE      Alexa Dominguez, APRN - CNP

## 2023-03-27 NOTE — TELEPHONE ENCOUNTER
Pt called and stated that his leg and foot is swollen and he can't hardly walk on it. Is worried that the infection has went into his leg. Informed him that he needed to go to the ED and let them look at it and if there was infection in it they could treat him for it.

## 2023-03-27 NOTE — CONSULTS
Pharmacy Note  Vancomycin Consult    Stewart Sommers is a 78 y.o. male started on Vancomycin for SSTI; consult received from [address] [place] [address] to manage therapy. Also receiving the following antibiotics: cefepime. Allergies:  Lisinopril, Ace inhibitors, Influenza vaccines, Tiotropium bromide monohydrate, and Vilanterol       Recent Labs     03/27/23  1217   CREATININE 0.9       Recent Labs     03/27/23  1217   WBC 9.8       Estimated Creatinine Clearance: 88 mL/min (based on SCr of 0.9 mg/dL). No intake or output data in the 24 hours ending 03/27/23 1553    Wt Readings from Last 1 Encounters:   03/27/23 205 lb 11.2 oz (93.3 kg)         Body mass index is 32.22 kg/m². Loading dose (critically ill or in ICU, require dialysis or renal replacement therapy): Vancomycin 25 mg/kg IVPB x 1 (maximum 2500 mg). Maintenance dose: 10-20 mg/kg (maximum: 2000 mg/dose and 4500 mg/day) starting at the next dosing interval determined by renal function  Pulse dose: fluctuating renal function, MICHAEL, ESRD  CRRT: 7.5-10 mg/kg q12h   Goal Vancomycin trough: 15-20 mcg/mL   Goal Vancomycin AUC: 400-600     Assessment/Plan:  Will initiate Vancomycin with a one time loading dose of 1750 mg x1, followed by 1000 mg IV every 12 hours. Calculated Vancomycin AUC = 489 mg/L*h with an estimated steady-state vancomycin trough = 15.8 mcg/mL. Vancomycin level ordered for 3/29 @ 1200. Timing of trough level will be determined based on culture results, renal function, and clinical response. Thank you for the consult.

## 2023-03-27 NOTE — ED PROVIDER NOTES
900 mg by mouth 3 times daily. INSULIN GLARGINE (LANTUS SOLOSTAR) 100 UNIT/ML INJECTION PEN    INJECT 12 UNITS INTO THE SKIN DAILY WITH SUPPER    IPRATROPIUM-ALBUTEROL (DUONEB) 0.5-2.5 (3) MG/3ML SOLN NEBULIZER SOLUTION    Inhale 1 vial into the lungs every 4 hours as needed    METOPROLOL SUCCINATE (TOPROL XL) 50 MG EXTENDED RELEASE TABLET    TAKE ONE TABLET BY MOUTH EVERY DAY    MISC. DEVICES MISC    Please provide with portable oxygen concentrator that patient can carry across his shoulders    NICOTINE POLACRILEX (NICORETTE) 2 MG GUM    Chew one piece of gum every 3 to 4  hours as needed for smoking cessation. No more than 5 pieces of gum in a day. ONDANSETRON (ZOFRAN) 4 MG TABLET    Take 1 tablet by mouth every 6 hours as needed for Nausea or Vomiting    OXYCODONE (ROXICODONE) 5 MG IMMEDIATE RELEASE TABLET    Take 10 mg by mouth every 8 hours as needed for Pain.     OXYCODONE HCL (OXY-IR) 10 MG IMMEDIATE RELEASE TABLET        PANTOPRAZOLE (PROTONIX) 40 MG TABLET    TAKE ONE TABLET BY MOUTH EVERY MORNING BEFORE BREAKFAST    PENTIPS 31G X 8 MM MISC    USE ONCE DAILY WITH INJECTION    THIAMINE 100 MG TABLET    TAKE 1 TABLET BY MOUTH DAILY    TRUE METRIX BLOOD GLUCOSE TEST STRIP    USE 1 STRIP IN VITRO ROUTE 2 TIMES DAILY    TRUEPLUS LANCETS 30G MISC    TEST 2 TIMES DAILY    VITAMIN D (VITAMIN D3 ULTRA STRENGTH) 125 MCG (5000 UT) CAPS CAPSULE    Take 1 capsule by mouth daily       ALLERGIES     Lisinopril, Ace inhibitors, Influenza vaccines, Tiotropium bromide monohydrate, and Vilanterol    FAMILYHISTORY       Family History   Problem Relation Age of Onset    Cancer Mother         Lung    Emphysema Father     Diabetes Sister     Hypertension Sister     Cancer Brother         throat    Asthma Neg Hx         SOCIAL HISTORY       Social History     Tobacco Use    Smoking status: Former     Packs/day: 1.00     Years: 42.00     Pack years: 42.00     Types: Cigarettes, Cigars     Start date: 9/4/2018     Quit date: 94   Resp: 21   Temp: 99 °F (37.2 °C)   TempSrc: Oral   SpO2: 99%   Weight: 205 lb (93 kg)   Height: 5' 7\" (1.702 m)       Patient was given the following medications:  Medications   vancomycin (VANCOCIN) 1,750 mg in sodium chloride 0.9 % 500 mL IVPB (1,750 mg IntraVENous New Bag 3/27/23 1237)   cefepime (MAXIPIME) 2,000 mg in sodium chloride 0.9 % 100 mL IVPB (mini-bag) (has no administration in time range)   magnesium sulfate 2000 mg in 50 mL IVPB premix (has no administration in time range)            Is this patient to be included in the SEP-1 Core Measure due to severe sepsis or septic shock? No   Exclusion criteria - the patient is NOT to be included for SEP-1 Core Measure due to:  2+ SIRS criteria are not met    PAST MEDICAL HISTORY      has a past medical history of Bronchitis chronic, Chest pain, Chronic cough, Cirrhosis of liver (HCC) (01/01/2017), COPD (chronic obstructive pulmonary disease) ([facility] [address] COPD (chronic obstructive pulmonary disease) ([facility] [address] Diabetes mellitus ([address] Gout, Hilar adenopathy, Hyperlipidemia, Hypertension, Knee pain, right, Numbness and tingling of leg, Osteoarthritis, Paresthesia of bilateral legs, PVD (peripheral vascular disease) ([address] Seizures ([address] and Substance abuse ([facility] [address] CC/HPI Summary, DDx, ED Course, and Reassessment: Wendie Brady is a 78 y.o. male who presents to the emergency department with right leg swelling and redness. Patient states that 4 days ago he hit his toe on a door when his dog slammed into it. He states that he was just discharged from the hospital about a month ago for cellulitis of his right upper arm and he was on IV antibiotics. Patient is not diabetic. He denies fever or chills. He wears oxygen chronically at home. Patient CBC and chemistry and lactic acid are normal.  His magnesium is low at 1.3 and I have ordered IV magnesium. X-ray of the right tib-fib and right ankle did not show any acute fractures or signs of osteomyelitis.

## 2023-03-27 NOTE — PROGRESS NOTES
RT Inhaler-Nebulizer Bronchodilator Protocol Note    There is a bronchodilator order in the chart from a provider indicating to follow the RT Bronchodilator Protocol and there is an Initiate RT Inhaler-Nebulizer Bronchodilator Protocol order as well (see protocol at bottom of note). CXR Findings:  No results found. The findings from the last RT Protocol Assessment were as follows:   History Pulmonary Disease: (P) Chronic pulmonary disease  Respiratory Pattern: (P) Dyspnea on exertion or RR 21-25 bpm  Breath Sounds: (P) Slightly diminished and/or crackles  Cough: (P) Strong, productive  Indication for Bronchodilator Therapy: (P) Decreased or absent breath sounds  Bronchodilator Assessment Score: (P) 7    Aerosolized bronchodilator medication orders have been revised according to the RT Inhaler-Nebulizer Bronchodilator Protocol below. Respiratory Therapist to perform RT Therapy Protocol Assessment initially then follow the protocol. Repeat RT Therapy Protocol Assessment PRN for score 0-3 or on second treatment, BID, and PRN for scores above 3. No Indications - adjust the frequency to every 6 hours PRN wheezing or bronchospasm, if no treatments needed after 48 hours then discontinue using Per Protocol order mode. If indication present, adjust the RT bronchodilator orders based on the Bronchodilator Assessment Score as indicated below. Use Inhaler orders unless patient has one or more of the following: on home nebulizer, not able to hold breath for 10 seconds, is not alert and oriented, cannot activate and use MDI correctly, or respiratory rate 25 breaths per minute or more, then use the equivalent nebulizer order(s) with same Frequency and PRN reasons based on the score. If a patient is on this medication at home then do not decrease Frequency below that used at home.     0-3 - enter or revise RT bronchodilator order(s) to equivalent RT Bronchodilator order with Frequency of every 4 hours PRN for wheezing or increased work of breathing using Per Protocol order mode. 4-6 - enter or revise RT Bronchodilator order(s) to two equivalent RT bronchodilator orders with one order with BID Frequency and one order with Frequency of every 4 hours PRN wheezing or increased work of breathing using Per Protocol order mode. 7-10 - enter or revise RT Bronchodilator order(s) to two equivalent RT bronchodilator orders with one order with TID Frequency and one order with Frequency of every 4 hours PRN wheezing or increased work of breathing using Per Protocol order mode. 11-13 - enter or revise RT Bronchodilator order(s) to one equivalent RT bronchodilator order with QID Frequency and an Albuterol order with Frequency of every 4 hours PRN wheezing or increased work of breathing using Per Protocol order mode. Greater than 13 - enter or revise RT Bronchodilator order(s) to one equivalent RT bronchodilator order with every 4 hours Frequency and an Albuterol order with Frequency of every 2 hours PRN wheezing or increased work of breathing using Per Protocol order mode.          Electronically signed by Lan Tay RCP on 3/27/2023 at 3:58 PM

## 2023-03-27 NOTE — PROGRESS NOTES
Patient admitted to room 219 from [facility] ED. Patient oriented to room, call light, bed rails, phone, lights and bathroom. Patient instructed about the schedule of the day including: vital sign frequency, lab draws, possible tests, frequency of MD and staff rounds, daily weights, I &O's and prescribed diet. no Telemetry box in place, patient aware of placement and reason. Bed locked, in lowest position, side rails up 2/4, call light within reach. Recliner Assessment  Patient is able to demonstrate the ability to move from a reclining position to an upright position within the recliner. 4 Eyes Skin Assessment     The patient is being assess for   Admission    I agree that 2 RN's have performed a thorough Head to Toe Skin Assessment on the patient. ALL assessment sites listed below have been assessed. Areas assessed for pressure by both nurses:   [x]   Head, Face, and Ears   [x]   Shoulders, Back, and Chest, Abdomen  [x]   Arms, Elbows, and Hands   [x]   Coccyx, Sacrum, and Ischium  [x]   Legs, Feet, and Heels    Pt has scattered bruising and abrasions, healing wound on Right forearm. New abrasion to right toe from home, redness and swelling to right foot and right lower leg. Skin Assessed Under all Medical Devices by both nurses:  O2 device tubing              All Mepilex Borders were peeled back and area peeked at by both nurses:  No: NA  Please list where Mepilex Borders are located:  NA             **SHARE this note so that the co-signing nurse is able to place an eSignature**    Co-signer eSignature: Electronically signed by Rhonda Rasheed RN on 3/27/23 at 7:30 PM EDT    Does the Patient have Skin Breakdown related to pressure?   No            Abraham Prevention initiated:  NA   Wound Care Orders initiated:  NA      WOC nurse consulted for Pressure Injury (Stage 3,4, Unstageable, DTI, NWPT, Complex wounds)and New or Established Ostomies:  NA

## 2023-03-28 ENCOUNTER — APPOINTMENT (OUTPATIENT)
Dept: VASCULAR LAB | Age: 68
End: 2023-03-28
Payer: MEDICARE

## 2023-03-28 LAB
ANION GAP SERPL CALCULATED.3IONS-SCNC: 7 MMOL/L (ref 3–16)
BASOPHILS # BLD: 0 K/UL (ref 0–0.2)
BASOPHILS NFR BLD: 0.3 %
BUN SERPL-MCNC: 11 MG/DL (ref 7–20)
CALCIUM SERPL-MCNC: 8.4 MG/DL (ref 8.3–10.6)
CHLORIDE SERPL-SCNC: 97 MMOL/L (ref 99–110)
CO2 SERPL-SCNC: 33 MMOL/L (ref 21–32)
CREAT SERPL-MCNC: 0.9 MG/DL (ref 0.8–1.3)
DEPRECATED RDW RBC AUTO: 14.3 % (ref 12.4–15.4)
EOSINOPHIL # BLD: 0.3 K/UL (ref 0–0.6)
EOSINOPHIL NFR BLD: 4 %
EST. AVERAGE GLUCOSE BLD GHB EST-MCNC: 125.5 MG/DL
GFR SERPLBLD CREATININE-BSD FMLA CKD-EPI: >60 ML/MIN/{1.73_M2}
GLUCOSE BLD-MCNC: 101 MG/DL (ref 70–99)
GLUCOSE BLD-MCNC: 115 MG/DL (ref 70–99)
GLUCOSE BLD-MCNC: 139 MG/DL (ref 70–99)
GLUCOSE BLD-MCNC: 146 MG/DL (ref 70–99)
GLUCOSE BLD-MCNC: 97 MG/DL (ref 70–99)
GLUCOSE SERPL-MCNC: 101 MG/DL (ref 70–99)
HBA1C MFR BLD: 6 %
HCT VFR BLD AUTO: 32.2 % (ref 40.5–52.5)
HGB BLD-MCNC: 11.1 G/DL (ref 13.5–17.5)
LYMPHOCYTES # BLD: 1.3 K/UL (ref 1–5.1)
LYMPHOCYTES NFR BLD: 16.5 %
MAGNESIUM SERPL-MCNC: 1.7 MG/DL (ref 1.8–2.4)
MCH RBC QN AUTO: 31.7 PG (ref 26–34)
MCHC RBC AUTO-ENTMCNC: 34.3 G/DL (ref 31–36)
MCV RBC AUTO: 92.5 FL (ref 80–100)
MONOCYTES # BLD: 0.8 K/UL (ref 0–1.3)
MONOCYTES NFR BLD: 10.8 %
NEUTROPHILS # BLD: 5.3 K/UL (ref 1.7–7.7)
NEUTROPHILS NFR BLD: 68.4 %
PERFORMED ON: ABNORMAL
PERFORMED ON: NORMAL
PLATELET # BLD AUTO: 243 K/UL (ref 135–450)
PMV BLD AUTO: 8.1 FL (ref 5–10.5)
POTASSIUM SERPL-SCNC: 3.5 MMOL/L (ref 3.5–5.1)
RBC # BLD AUTO: 3.49 M/UL (ref 4.2–5.9)
SODIUM SERPL-SCNC: 137 MMOL/L (ref 136–145)
WBC # BLD AUTO: 7.7 K/UL (ref 4–11)

## 2023-03-28 PROCEDURE — 83735 ASSAY OF MAGNESIUM: CPT

## 2023-03-28 PROCEDURE — 6370000000 HC RX 637 (ALT 250 FOR IP): Performed by: NURSE PRACTITIONER

## 2023-03-28 PROCEDURE — 1200000000 HC SEMI PRIVATE

## 2023-03-28 PROCEDURE — 94640 AIRWAY INHALATION TREATMENT: CPT

## 2023-03-28 PROCEDURE — 2700000000 HC OXYGEN THERAPY PER DAY

## 2023-03-28 PROCEDURE — 6370000000 HC RX 637 (ALT 250 FOR IP): Performed by: INTERNAL MEDICINE

## 2023-03-28 PROCEDURE — 93971 EXTREMITY STUDY: CPT

## 2023-03-28 PROCEDURE — 80048 BASIC METABOLIC PNL TOTAL CA: CPT

## 2023-03-28 PROCEDURE — 2580000003 HC RX 258: Performed by: NURSE PRACTITIONER

## 2023-03-28 PROCEDURE — 85025 COMPLETE CBC W/AUTO DIFF WBC: CPT

## 2023-03-28 PROCEDURE — 94761 N-INVAS EAR/PLS OXIMETRY MLT: CPT

## 2023-03-28 PROCEDURE — 36415 COLL VENOUS BLD VENIPUNCTURE: CPT

## 2023-03-28 PROCEDURE — 99232 SBSQ HOSP IP/OBS MODERATE 35: CPT | Performed by: INTERNAL MEDICINE

## 2023-03-28 PROCEDURE — 6360000002 HC RX W HCPCS: Performed by: NURSE PRACTITIONER

## 2023-03-28 RX ORDER — ALBUTEROL SULFATE 90 UG/1
2 AEROSOL, METERED RESPIRATORY (INHALATION) 2 TIMES DAILY
Status: DISCONTINUED | OUTPATIENT
Start: 2023-03-28 | End: 2023-03-31 | Stop reason: HOSPADM

## 2023-03-28 RX ADMIN — CEFEPIME 2000 MG: 2 INJECTION, POWDER, FOR SOLUTION INTRAVENOUS at 17:54

## 2023-03-28 RX ADMIN — Medication 2 PUFF: at 07:18

## 2023-03-28 RX ADMIN — DILTIAZEM HYDROCHLORIDE 360 MG: 180 CAPSULE, COATED, EXTENDED RELEASE ORAL at 11:47

## 2023-03-28 RX ADMIN — GABAPENTIN 900 MG: 300 CAPSULE ORAL at 11:47

## 2023-03-28 RX ADMIN — VANCOMYCIN HYDROCHLORIDE 1000 MG: 1 INJECTION, POWDER, LYOPHILIZED, FOR SOLUTION INTRAVENOUS at 23:50

## 2023-03-28 RX ADMIN — GABAPENTIN 900 MG: 300 CAPSULE ORAL at 20:46

## 2023-03-28 RX ADMIN — FUROSEMIDE 20 MG: 20 TABLET ORAL at 11:47

## 2023-03-28 RX ADMIN — FOLIC ACID 1000 MCG: 1 TABLET ORAL at 11:47

## 2023-03-28 RX ADMIN — Medication 100 MG: at 11:47

## 2023-03-28 RX ADMIN — ENOXAPARIN SODIUM 40 MG: 100 INJECTION SUBCUTANEOUS at 18:01

## 2023-03-28 RX ADMIN — CEFEPIME 2000 MG: 2 INJECTION, POWDER, FOR SOLUTION INTRAVENOUS at 02:25

## 2023-03-28 RX ADMIN — VANCOMYCIN HYDROCHLORIDE 1000 MG: 1 INJECTION, POWDER, LYOPHILIZED, FOR SOLUTION INTRAVENOUS at 01:13

## 2023-03-28 RX ADMIN — GABAPENTIN 900 MG: 300 CAPSULE ORAL at 14:50

## 2023-03-28 RX ADMIN — VANCOMYCIN HYDROCHLORIDE 1000 MG: 1 INJECTION, POWDER, LYOPHILIZED, FOR SOLUTION INTRAVENOUS at 13:12

## 2023-03-28 RX ADMIN — INSULIN GLARGINE 12 UNITS: 100 INJECTION, SOLUTION SUBCUTANEOUS at 18:01

## 2023-03-28 RX ADMIN — PANTOPRAZOLE SODIUM 40 MG: 40 TABLET, DELAYED RELEASE ORAL at 05:57

## 2023-03-28 RX ADMIN — FERROUS SULFATE TAB 325 MG (65 MG ELEMENTAL FE) 325 MG: 325 (65 FE) TAB at 11:47

## 2023-03-28 RX ADMIN — ATORVASTATIN CALCIUM 40 MG: 40 TABLET, FILM COATED ORAL at 11:47

## 2023-03-28 RX ADMIN — METOPROLOL SUCCINATE 50 MG: 50 TABLET, EXTENDED RELEASE ORAL at 11:47

## 2023-03-28 RX ADMIN — SODIUM CHLORIDE: 9 INJECTION, SOLUTION INTRAVENOUS at 17:53

## 2023-03-28 RX ADMIN — DULOXETINE HYDROCHLORIDE 60 MG: 60 CAPSULE, DELAYED RELEASE ORAL at 11:47

## 2023-03-28 RX ADMIN — Medication 2 PUFF: at 19:52

## 2023-03-28 RX ADMIN — ALLOPURINOL 300 MG: 300 TABLET ORAL at 11:47

## 2023-03-28 NOTE — PROGRESS NOTES
Zohreh Pritchard discontinued as therapeutic duplication/drug interaction to Symbicort 160/4.5 + Jose Cruzoneb.   LAVERNE Loomis.Ph.3/27/02204:27 PM

## 2023-03-28 NOTE — PROGRESS NOTES
RT Inhaler-Nebulizer Bronchodilator Protocol Note    There is a bronchodilator order in the chart from a provider indicating to follow the RT Bronchodilator Protocol and there is an Initiate RT Inhaler-Nebulizer Bronchodilator Protocol order as well (see protocol at bottom of note). CXR Findings:  No results found. The findings from the last RT Protocol Assessment were as follows:   History Pulmonary Disease: Chronic pulmonary disease  Respiratory Pattern: Regular pattern and RR 12-20 bpm  Breath Sounds: Slightly diminished and/or crackles  Cough: Strong, spontaneous, non-productive  Indication for Bronchodilator Therapy: Decreased or absent breath sounds  Bronchodilator Assessment Score: 4    Aerosolized bronchodilator medication orders have been revised according to the RT Inhaler-Nebulizer Bronchodilator Protocol below. Respiratory Therapist to perform RT Therapy Protocol Assessment initially then follow the protocol. Repeat RT Therapy Protocol Assessment PRN for score 0-3 or on second treatment, BID, and PRN for scores above 3. No Indications - adjust the frequency to every 6 hours PRN wheezing or bronchospasm, if no treatments needed after 48 hours then discontinue using Per Protocol order mode. If indication present, adjust the RT bronchodilator orders based on the Bronchodilator Assessment Score as indicated below. Use Inhaler orders unless patient has one or more of the following: on home nebulizer, not able to hold breath for 10 seconds, is not alert and oriented, cannot activate and use MDI correctly, or respiratory rate 25 breaths per minute or more, then use the equivalent nebulizer order(s) with same Frequency and PRN reasons based on the score. If a patient is on this medication at home then do not decrease Frequency below that used at home.     0-3 - enter or revise RT bronchodilator order(s) to equivalent RT Bronchodilator order with Frequency of every 4 hours PRN for wheezing or increased work of breathing using Per Protocol order mode. 4-6 - enter or revise RT Bronchodilator order(s) to two equivalent RT bronchodilator orders with one order with BID Frequency and one order with Frequency of every 4 hours PRN wheezing or increased work of breathing using Per Protocol order mode. 7-10 - enter or revise RT Bronchodilator order(s) to two equivalent RT bronchodilator orders with one order with TID Frequency and one order with Frequency of every 4 hours PRN wheezing or increased work of breathing using Per Protocol order mode. 11-13 - enter or revise RT Bronchodilator order(s) to one equivalent RT bronchodilator order with QID Frequency and an Albuterol order with Frequency of every 4 hours PRN wheezing or increased work of breathing using Per Protocol order mode. Greater than 13 - enter or revise RT Bronchodilator order(s) to one equivalent RT bronchodilator order with every 4 hours Frequency and an Albuterol order with Frequency of every 2 hours PRN wheezing or increased work of breathing using Per Protocol order mode.          Electronically signed by Betty Ross RCP on 3/28/2023 at 7:56 PM

## 2023-03-28 NOTE — PROGRESS NOTES
Patient currently resting in bed with eyes closed. Alert and oriented x 4. On oxygen NC 2 lpm and tolerating well. Up without assistance and tolerating well. Patient also has urinal at bedside. RUE wrapped with xeroform and gauze wrap. Patient on continuous pulse ox. Denies pain or discomfort. Nurse will continue to monitor/reassess. Call light within reach.

## 2023-03-28 NOTE — PROGRESS NOTES
Physician Progress Note      Dory Valderrama  CSN #:                  [id number]  :                       1955  ADMIT DATE:       3/27/2023 11:31 AM  DISCH DATE:  RESPONDING  PROVIDER #:        Leobardo Mota          QUERY TEXT:    Pt admitted with right lower extremity cellulitis. Pt noted to have DM 2. If   possible, please document in progress notes and discharge summary the   relationship, if any, between cellulitis and DM. The medical record reflects the following:  Risk Factors: Right lower extremity cellulitis, DM 2  Clinical Indicators: H&P notes- +right lower extremity with edema, erythema,   warmth extending from toes up into the shins with some streaking. +healing   laceration on great toe, +healing large right upper extremity lesion. Glucose   109, 125, 109, 101, 87    Treatment: IV vancomycin and cefepime, monitor glucose, on lantus, SSI    Thank You Ila Ruggiero RN, CDS [email or web address]  Options provided:  -- Right lower extremity cellulitis associated with Diabetes  -- Right lower extremity cellulitis unrelated to Diabetes  -- Other - I will add my own diagnosis  -- Disagree - Not applicable / Not valid  -- Disagree - Clinically unable to determine / Unknown  -- Refer to Clinical Documentation Reviewer    PROVIDER RESPONSE TEXT:    Right lower extremity cellulitis unrelated to Diabetes.     Query created by: Isak Mckeon on 3/28/2023 11:18 AM      Electronically signed by:  Leobardo Mota 3/28/2023 12:48 PM

## 2023-03-28 NOTE — ACP (ADVANCE CARE PLANNING)
Advance Care Planning     General Advance Care Planning (ACP) Conversation    Date of Conversation: 3/27/2023  Conducted with: Patient with Decision Making Capacity    Healthcare Decision Maker:    Primary Decision Maker: Zoila Samson - Brother/Sister - [phone]  Click here to complete Healthcare Decision Makers including selection of the Healthcare Decision Maker Relationship (ie \"Primary\"). Today we documented Decision Maker(s) consistent with Legal Next of Kin hierarchy.     Content/Action Overview:  DECLINED ACP Conversation - will revisit periodically  Reviewed DNR/DNI and patient elects Full Code (Attempt Resuscitation)        Length of Voluntary ACP Conversation in minutes:  <16 minutes (Non-Billable)    Clyde Hamilton RN Mother

## 2023-03-28 NOTE — PROGRESS NOTES
Vancomycin Day: 2  Current Regimen: 1000 mg IV every 12 hours    Patient's labs, cultures, vitals, and vancomycin regimen reviewed. No changes today.

## 2023-03-28 NOTE — CARE COORDINATION
Case Management Assessment  Initial Evaluation    Date/Time of Evaluation: 3/28/2023 4:26 PM  Assessment Completed by: Araseli Harrison RN    If patient is discharged prior to next notation, then this note serves as note for discharge by case management. Patient Name: Lenka Navas                   YOB: 1955  Diagnosis: Cellulitis of right foot [L03.115]  Cellulitis of right lower extremity [L03.115]                   Date / Time: 3/27/2023 11:31 AM    Patient Admission Status: Inpatient   Readmission Risk (Low < 19, Mod (19-27), High > 27): Readmission Risk Score: 22.6    Current PCP: Yassine Moreno MD  PCP verified by CM? Yes (Dr. David Colmenares)    Chart Reviewed: Yes      History Provided by: Patient  Patient Orientation: Alert and Oriented    Patient Cognition: Alert    Hospitalization in the last 30 days (Readmission):  Yes    If yes, Readmission Assessment in CM Navigator will be completed. Advance Directives:      Code Status: Full Code   Patient's Primary Decision Maker is: Legal Next of Kin    Primary Decision MakerSsandoval Patel - Brother/Sister - [phone]    Discharge Planning:    Patient lives with: Alone Type of Home: Trailer/Mobile Home  Primary Care Giver: Self  Patient Support Systems include: Family Members, Friends/Neighbors   Current Financial resources: [employer], Medicare, Medicaid  Current community resources: None  Current services prior to admission: Oxygen Therapy            Current DME: Oxygen Therapy (Comment)            Type of Home Care services:  None    ADLS  Prior functional level: Independent in ADLs/IADLs  Current functional level: Independent in ADLs/IADLs    PT AM-PAC:   /24  OT AM-PAC:   /24    Family can provide assistance at DC: Yes  Would you like Case Management to discuss the discharge plan with any other family members/significant others, and if so, who?  Yes (Brother Theone Zev)  Plans to Return to Present Housing:    Other Identified his family were provided with a choice of provider and agrees with the discharge plan. Freedom of choice list with basic dialogue that supports the patient's individualized plan of care/goals and shares the quality data associated with the providers was provided to:     Patient Representative Name:       The Patient and/or Patient Representative Agree with the Discharge Plan?       Surya Danielle RN  Case Management Department  Ph: [phone] Fax: [phone]

## 2023-03-28 NOTE — PROGRESS NOTES
Pt changed to bedside O2 monitor, telemetry removed. Brennon notified. Primary Sagrario RN notified of switch and to listen for bedside alarms.

## 2023-03-28 NOTE — PROGRESS NOTES
IM Progress Note    Admit Date:  3/27/2023    Admitted with right leg cellulitis    Subjective:  Mr. May Sharif is doing about the same. Right leg is swollen and tender. Lower extremity Doppler completed    Objective:   /67   Pulse 82   Temp 98.3 °F (36.8 °C) (Oral)   Resp 18   Ht 5' 7\" (1.702 m)   Wt 205 lb 11.2 oz (93.3 kg)   SpO2 96%   BMI 32.22 kg/m²     Intake/Output Summary (Last 24 hours) at 3/28/2023 1722  Last data filed at 3/28/2023 1230  Gross per 24 hour   Intake 956 ml   Output --   Net 956 ml         Physical Exam:  General:  Awake, alert, NAD  Skin:  Warm and dry  Neck:  JVD absent. Neck supple  Chest: Diminished with sounds. no wheezes, rales or rhonchi. Cardiovascular:  RRR ,S1S2 normal  Abdomen:  Soft, non tender, non distended, BS +  Skin:Extremities: Right lower extremity with 1+ edema, diffusely swollen , tender with diffuse erythema, warm. Erythema extending from the foot up to the shins. Healing right upper extremity skin lesions  Intact peripheral pulses.  Brisk cap refill, < 2 secs  Neuro: non focal      Medications:   Scheduled Meds:   albuterol sulfate HFA  2 puff Inhalation BID    allopurinol  300 mg Oral Daily    atorvastatin  40 mg Oral Daily    dilTIAZem  360 mg Oral Daily    DULoxetine  60 mg Oral Daily    ferrous sulfate  325 mg Oral Daily    folic acid  2,467 mcg Oral Daily    furosemide  20 mg Oral Daily    gabapentin  900 mg Oral TID    metoprolol succinate  50 mg Oral Daily    pantoprazole  40 mg Oral QAM AC    thiamine  100 mg Oral Daily    sodium chloride flush  5-40 mL IntraVENous 2 times per day    enoxaparin  40 mg SubCUTAneous QPM    insulin lispro  0-4 Units SubCUTAneous TID WC    insulin lispro  0-4 Units SubCUTAneous Nightly    cefepime  2,000 mg IntraVENous Q12H    vancomycin  1,000 mg IntraVENous Q12H    insulin glargine  12 Units SubCUTAneous QPM    budesonide-formoterol  2 puff Inhalation BID       Continuous Infusions:   sodium chloride Assessment:  Principal Problem:    Cellulitis of right foot  Active Problems:    Chronic obstructive pulmonary disease (HCC)    Type 2 diabetes mellitus without complication, with long-term current use of insulin (HCC)    PVD (peripheral vascular disease) (HCC)    Cellulitis of right lower extremity  Resolved Problems:    * No resolved hospital problems. *      Plan:    #Right lower extremity cellulitis   -afebrile, no WBC  -procalc elevated   -xray as above   -venous doppler pending   -blood cultures pending   -oxycodone prn for pain   -Continue IV vancomycin and cefepime D #2      #Hypokalemia   #Hypomagnesemia   -replace      #COPD without AE   #Chronic hypoxic respiratory failure   -is on 2 L O2 nightly  -prn inhalers      #DM type 2   #Neuropathy   -on gabapentin   -on lantus   -SSI   -monitor BG      #Hx of alcoholic liver cirrhosis    -does not appear to have any acute decompensation   -reportedly has not drank alcohol in 1 month   -on lasix  -LFTs stable   -follow up with GI outpatient      #Hx of right arm cellulitis   -healing skin lesion   -local wound care      #HTN   -on toprol XL, cardizem, lasix      #HLD   -on statin      #Gout   -on allopurinol     #PVD   -on EMR   -on statin   -had normal arterial duplexes in 2020  -follow up with vascular      DVT Prophylaxis: Lovenox   ADULT DIET;  Regular; 4 carb choices (60 gm/meal)   Full Code           Valerie Delacruz MD   3/28/2023 5:22 PM

## 2023-03-28 NOTE — PROGRESS NOTES
RT Inhaler-Nebulizer Bronchodilator Protocol Note    There is a bronchodilator order in the chart from a provider indicating to follow the RT Bronchodilator Protocol and there is an Initiate RT Inhaler-Nebulizer Bronchodilator Protocol order as well (see protocol at bottom of note). CXR Findings:  No results found. The findings from the last RT Protocol Assessment were as follows:   History Pulmonary Disease: (P) Chronic pulmonary disease  Respiratory Pattern: (P) Regular pattern and RR 12-20 bpm  Breath Sounds: (P) Slightly diminished and/or crackles  Cough: (P) Strong, spontaneous, non-productive  Indication for Bronchodilator Therapy: (P) Decreased or absent breath sounds  Bronchodilator Assessment Score: (P) 4    Aerosolized bronchodilator medication orders have been revised according to the RT Inhaler-Nebulizer Bronchodilator Protocol below. Respiratory Therapist to perform RT Therapy Protocol Assessment initially then follow the protocol. Repeat RT Therapy Protocol Assessment PRN for score 0-3 or on second treatment, BID, and PRN for scores above 3. No Indications - adjust the frequency to every 6 hours PRN wheezing or bronchospasm, if no treatments needed after 48 hours then discontinue using Per Protocol order mode. If indication present, adjust the RT bronchodilator orders based on the Bronchodilator Assessment Score as indicated below. Use Inhaler orders unless patient has one or more of the following: on home nebulizer, not able to hold breath for 10 seconds, is not alert and oriented, cannot activate and use MDI correctly, or respiratory rate 25 breaths per minute or more, then use the equivalent nebulizer order(s) with same Frequency and PRN reasons based on the score. If a patient is on this medication at home then do not decrease Frequency below that used at home.     0-3 - enter or revise RT bronchodilator order(s) to equivalent RT Bronchodilator order with Frequency of every 4 hours PRN for wheezing or increased work of breathing using Per Protocol order mode. 4-6 - enter or revise RT Bronchodilator order(s) to two equivalent RT bronchodilator orders with one order with BID Frequency and one order with Frequency of every 4 hours PRN wheezing or increased work of breathing using Per Protocol order mode. 7-10 - enter or revise RT Bronchodilator order(s) to two equivalent RT bronchodilator orders with one order with TID Frequency and one order with Frequency of every 4 hours PRN wheezing or increased work of breathing using Per Protocol order mode. 11-13 - enter or revise RT Bronchodilator order(s) to one equivalent RT bronchodilator order with QID Frequency and an Albuterol order with Frequency of every 4 hours PRN wheezing or increased work of breathing using Per Protocol order mode. Greater than 13 - enter or revise RT Bronchodilator order(s) to one equivalent RT bronchodilator order with every 4 hours Frequency and an Albuterol order with Frequency of every 2 hours PRN wheezing or increased work of breathing using Per Protocol order mode. RT to enter RT Home Evaluation for COPD & MDI Assessment order using Per Protocol order mode.     Electronically signed by Sharla Mijares RCP on 3/28/2023 at 7:26 AM

## 2023-03-29 LAB
ANION GAP SERPL CALCULATED.3IONS-SCNC: 8 MMOL/L (ref 3–16)
BASOPHILS # BLD: 0 K/UL (ref 0–0.2)
BASOPHILS NFR BLD: 0.5 %
BUN SERPL-MCNC: 8 MG/DL (ref 7–20)
CALCIUM SERPL-MCNC: 8.7 MG/DL (ref 8.3–10.6)
CHLORIDE SERPL-SCNC: 99 MMOL/L (ref 99–110)
CO2 SERPL-SCNC: 31 MMOL/L (ref 21–32)
CREAT SERPL-MCNC: 0.6 MG/DL (ref 0.8–1.3)
DEPRECATED RDW RBC AUTO: 14.3 % (ref 12.4–15.4)
EOSINOPHIL # BLD: 0.2 K/UL (ref 0–0.6)
EOSINOPHIL NFR BLD: 3.2 %
GFR SERPLBLD CREATININE-BSD FMLA CKD-EPI: >60 ML/MIN/{1.73_M2}
GLUCOSE BLD-MCNC: 138 MG/DL (ref 70–99)
GLUCOSE BLD-MCNC: 146 MG/DL (ref 70–99)
GLUCOSE BLD-MCNC: 149 MG/DL (ref 70–99)
GLUCOSE BLD-MCNC: 153 MG/DL (ref 70–99)
GLUCOSE SERPL-MCNC: 136 MG/DL (ref 70–99)
HCT VFR BLD AUTO: 32.2 % (ref 40.5–52.5)
HGB BLD-MCNC: 10.9 G/DL (ref 13.5–17.5)
LYMPHOCYTES # BLD: 1.3 K/UL (ref 1–5.1)
LYMPHOCYTES NFR BLD: 17.5 %
MAGNESIUM SERPL-MCNC: 1.6 MG/DL (ref 1.8–2.4)
MCH RBC QN AUTO: 31 PG (ref 26–34)
MCHC RBC AUTO-ENTMCNC: 33.8 G/DL (ref 31–36)
MCV RBC AUTO: 91.9 FL (ref 80–100)
MONOCYTES # BLD: 0.8 K/UL (ref 0–1.3)
MONOCYTES NFR BLD: 10 %
NEUTROPHILS # BLD: 5.2 K/UL (ref 1.7–7.7)
NEUTROPHILS NFR BLD: 68.8 %
PERFORMED ON: ABNORMAL
PLATELET # BLD AUTO: 259 K/UL (ref 135–450)
PMV BLD AUTO: 7.8 FL (ref 5–10.5)
POTASSIUM SERPL-SCNC: 3.2 MMOL/L (ref 3.5–5.1)
RBC # BLD AUTO: 3.51 M/UL (ref 4.2–5.9)
SODIUM SERPL-SCNC: 138 MMOL/L (ref 136–145)
VANCOMYCIN TROUGH SERPL-MCNC: 10.8 UG/ML (ref 10–20)
WBC # BLD AUTO: 7.5 K/UL (ref 4–11)

## 2023-03-29 PROCEDURE — 6370000000 HC RX 637 (ALT 250 FOR IP): Performed by: INTERNAL MEDICINE

## 2023-03-29 PROCEDURE — 99232 SBSQ HOSP IP/OBS MODERATE 35: CPT | Performed by: INTERNAL MEDICINE

## 2023-03-29 PROCEDURE — 94640 AIRWAY INHALATION TREATMENT: CPT

## 2023-03-29 PROCEDURE — 80048 BASIC METABOLIC PNL TOTAL CA: CPT

## 2023-03-29 PROCEDURE — 80202 ASSAY OF VANCOMYCIN: CPT

## 2023-03-29 PROCEDURE — 94761 N-INVAS EAR/PLS OXIMETRY MLT: CPT

## 2023-03-29 PROCEDURE — 6360000002 HC RX W HCPCS: Performed by: NURSE PRACTITIONER

## 2023-03-29 PROCEDURE — 1200000000 HC SEMI PRIVATE

## 2023-03-29 PROCEDURE — 2580000003 HC RX 258: Performed by: NURSE PRACTITIONER

## 2023-03-29 PROCEDURE — 36415 COLL VENOUS BLD VENIPUNCTURE: CPT

## 2023-03-29 PROCEDURE — 2700000000 HC OXYGEN THERAPY PER DAY

## 2023-03-29 PROCEDURE — 6360000002 HC RX W HCPCS

## 2023-03-29 PROCEDURE — 85025 COMPLETE CBC W/AUTO DIFF WBC: CPT

## 2023-03-29 PROCEDURE — 6370000000 HC RX 637 (ALT 250 FOR IP): Performed by: NURSE PRACTITIONER

## 2023-03-29 PROCEDURE — 83735 ASSAY OF MAGNESIUM: CPT

## 2023-03-29 RX ADMIN — CEFEPIME 2000 MG: 2 INJECTION, POWDER, FOR SOLUTION INTRAVENOUS at 02:10

## 2023-03-29 RX ADMIN — GABAPENTIN 900 MG: 300 CAPSULE ORAL at 15:18

## 2023-03-29 RX ADMIN — ALLOPURINOL 300 MG: 300 TABLET ORAL at 10:32

## 2023-03-29 RX ADMIN — GABAPENTIN 900 MG: 300 CAPSULE ORAL at 21:23

## 2023-03-29 RX ADMIN — METOPROLOL SUCCINATE 50 MG: 50 TABLET, EXTENDED RELEASE ORAL at 10:32

## 2023-03-29 RX ADMIN — Medication 2 PUFF: at 19:29

## 2023-03-29 RX ADMIN — FERROUS SULFATE TAB 325 MG (65 MG ELEMENTAL FE) 325 MG: 325 (65 FE) TAB at 10:31

## 2023-03-29 RX ADMIN — CEFEPIME 2000 MG: 2 INJECTION, POWDER, FOR SOLUTION INTRAVENOUS at 15:19

## 2023-03-29 RX ADMIN — INSULIN GLARGINE 12 UNITS: 100 INJECTION, SOLUTION SUBCUTANEOUS at 18:34

## 2023-03-29 RX ADMIN — POTASSIUM CHLORIDE 40 MEQ: 1500 TABLET, EXTENDED RELEASE ORAL at 10:32

## 2023-03-29 RX ADMIN — VANCOMYCIN HYDROCHLORIDE 1250 MG: 10 INJECTION, POWDER, LYOPHILIZED, FOR SOLUTION INTRAVENOUS at 13:09

## 2023-03-29 RX ADMIN — Medication 2 PUFF: at 07:31

## 2023-03-29 RX ADMIN — Medication 100 MG: at 10:32

## 2023-03-29 RX ADMIN — Medication 10 ML: at 21:24

## 2023-03-29 RX ADMIN — DILTIAZEM HYDROCHLORIDE 360 MG: 180 CAPSULE, COATED, EXTENDED RELEASE ORAL at 10:31

## 2023-03-29 RX ADMIN — MAGNESIUM SULFATE HEPTAHYDRATE 2000 MG: 40 INJECTION, SOLUTION INTRAVENOUS at 10:40

## 2023-03-29 RX ADMIN — ATORVASTATIN CALCIUM 40 MG: 40 TABLET, FILM COATED ORAL at 10:32

## 2023-03-29 RX ADMIN — FOLIC ACID 1000 MCG: 1 TABLET ORAL at 10:32

## 2023-03-29 RX ADMIN — FUROSEMIDE 20 MG: 20 TABLET ORAL at 10:32

## 2023-03-29 RX ADMIN — GABAPENTIN 900 MG: 300 CAPSULE ORAL at 10:32

## 2023-03-29 RX ADMIN — DULOXETINE HYDROCHLORIDE 60 MG: 60 CAPSULE, DELAYED RELEASE ORAL at 10:31

## 2023-03-29 RX ADMIN — ENOXAPARIN SODIUM 40 MG: 100 INJECTION SUBCUTANEOUS at 18:34

## 2023-03-29 RX ADMIN — PANTOPRAZOLE SODIUM 40 MG: 40 TABLET, DELAYED RELEASE ORAL at 05:58

## 2023-03-29 ASSESSMENT — PAIN DESCRIPTION - LOCATION: LOCATION: FOOT;KNEE

## 2023-03-29 ASSESSMENT — PAIN DESCRIPTION - ORIENTATION: ORIENTATION: RIGHT

## 2023-03-29 ASSESSMENT — PAIN SCALES - GENERAL: PAINLEVEL_OUTOF10: 4

## 2023-03-29 NOTE — PROGRESS NOTES
Pharmacy Vancomycin Consult     Vancomycin Day: 3  Current Dosin mg IV every 12 hours  Current indication: SSTI    Temp max:  97.9    Recent Labs     23  0504 23  0515   BUN 11 8   CREATININE 0.9 0.6*   WBC 7.7 7.5       Intake/Output Summary (Last 24 hours) at 3/29/2023 1301  Last data filed at 3/29/2023 0912  Gross per 24 hour   Intake 480 ml   Output --   Net 480 ml       Ht Readings from Last 1 Encounters:   23 5' 7\" (1.702 m)        Wt Readings from Last 1 Encounters:   23 205 lb 11.2 oz (93.3 kg)       Body mass index is 32.22 kg/m². Estimated Creatinine Clearance: 132 mL/min (A) (based on SCr of 0.6 mg/dL (L)). Trough: 10.8 (3/29/23 1156)    Assessment/Plan:  Will increase the dose to 1250 mg IV every 12 hours. Predicted AUC = 414. Vancomycin trough level ordered for 3/31/23 0000. Pharmacy will continue to monitor and adjust as necessary.

## 2023-03-29 NOTE — FLOWSHEET NOTE
03/29/23 0912   Vital Signs   Temp 97.9 °F (36.6 °C)   Temp Source Oral   Heart Rate 77   Heart Rate Source Monitor   Resp 18   BP (!) 145/63   MAP (Calculated) 90   BP Location Right upper arm   BP Method Automatic   Patient Position Semi fowlers   Pain Assessment   Pain Assessment 0-10   Pain Level 4   Pain Location Foot;Knee   Pain Orientation Right   Oxygen Therapy   SpO2 98 %   O2 Device Nasal cannula   O2 Flow Rate (L/min) 2 L/min   Pt a/o. Am assessment completed see flow sheet. Pt denies any pain/ needs at this time. Call light within reach. Will continue to monitor. Bedside Mobility Assessment Tool (BMAT):     Assessment Level 1- Sit and Shake    1. From a semi-reclined position, ask patient to sit up and rotate to a seated position at the side of the bed. Can use the bedrail. 2. Ask patient to reach out and grab your hand and shake making sure patient reaches across his/her midline. Pass- Patient is able to come to a seated position, maintain core strength. Maintains seated balance while reaching across midline. Move on to Assessment Level 2. Assessment Level 2- Stretch and Point   1. With patient in seated position at the side of the bed, have patient place both feet on the floor (or stool) with knees no higher than hips. 2. Ask patient to stretch one leg and straighten the knee, then bend the ankle/flex and point the toes. If appropriate, repeat with the other leg. Pass- Patient is able to demonstrate appropriate quad strength on intended weight bearing limb(s). Move onto Assessment Level 3. Assessment Level 3- Stand   1. Ask patient to elevate off the bed or chair (seated to standing) using an assistive device (cane, bedrail). 2. Patient should be able to raise buttocks off be and hold for a count of five. May repeat once. Pass- Patient maintains standing stability for at least 5 seconds, proceed to assessment level 4. Assessment Level 4- Walk   1.  Ask patient to march in place at bedside. 2. Then ask patient to advance step and return each foot. Some medical conditions may render a patient from stepping backwards, use your best clinical judgement. Pass- Patient demonstrates balance while shifting weight and ability to step, takes independent steps, does not use assistive device patient is MOBILITY LEVEL 4.       Mobility Level- 4

## 2023-03-29 NOTE — PROGRESS NOTES
IM Progress Note    Admit Date:  3/27/2023    Admitted with right leg cellulitis    Subjective:  Mr. Kirti Alonso is improving today. Right leg is less swollen and less tender today, area of erythema has decreased. Lower extremity Doppler completed-no DVT    Objective:   /63   Pulse 77   Temp 98.6 °F (37 °C) (Oral)   Resp 18   Ht 5' 7\" (1.702 m)   Wt 205 lb 11.2 oz (93.3 kg)   SpO2 92%   BMI 32.22 kg/m²     Intake/Output Summary (Last 24 hours) at 3/29/2023 1628  Last data filed at 3/29/2023 1309  Gross per 24 hour   Intake 720 ml   Output --   Net 720 ml           Physical Exam:  General:  Awake, alert, NAD  Skin:  Warm and dry  Neck:  JVD absent. Neck supple  Chest: Diminished with sounds. no wheezes, rales or rhonchi. Cardiovascular:  RRR ,S1S2 normal  Abdomen:  Soft, non tender, non distended, BS +  Skin:Extremities:   Right lower extremity with 1+ edema-edema has decreased today less tender and less erythematous and less warm today. Healing right upper extremity skin lesions  Intact peripheral pulses.  Brisk cap refill, < 2 secs  Neuro: non focal      Medications:   Scheduled Meds:   vancomycin  1,250 mg IntraVENous Q12H    albuterol sulfate HFA  2 puff Inhalation BID    allopurinol  300 mg Oral Daily    atorvastatin  40 mg Oral Daily    dilTIAZem  360 mg Oral Daily    DULoxetine  60 mg Oral Daily    ferrous sulfate  325 mg Oral Daily    folic acid  1,202 mcg Oral Daily    furosemide  20 mg Oral Daily    gabapentin  900 mg Oral TID    metoprolol succinate  50 mg Oral Daily    pantoprazole  40 mg Oral QAM AC    thiamine  100 mg Oral Daily    sodium chloride flush  5-40 mL IntraVENous 2 times per day    enoxaparin  40 mg SubCUTAneous QPM    insulin lispro  0-4 Units SubCUTAneous TID WC    insulin lispro  0-4 Units SubCUTAneous Nightly    cefepime  2,000 mg IntraVENous Q12H    insulin glargine  12 Units SubCUTAneous QPM    budesonide-formoterol  2 puff Inhalation BID       Continuous Infusions:

## 2023-03-29 NOTE — PROGRESS NOTES
Patient currently resting in bed with eyes closed. Alert and oriented x 4. On oxygen NC 2 lpm and tolerating well. Pleasant and cooperative with care. Patient up without assistance and tolerating well. Continuous pulse ox in place. Nurse will continue to monitor/reassess. Call light within reach.

## 2023-03-29 NOTE — PROGRESS NOTES
increased work of breathing using Per Protocol order mode. 4-6 - enter or revise RT Bronchodilator order(s) to two equivalent RT bronchodilator orders with one order with BID Frequency and one order with Frequency of every 4 hours PRN wheezing or increased work of breathing using Per Protocol order mode. 7-10 - enter or revise RT Bronchodilator order(s) to two equivalent RT bronchodilator orders with one order with TID Frequency and one order with Frequency of every 4 hours PRN wheezing or increased work of breathing using Per Protocol order mode. 11-13 - enter or revise RT Bronchodilator order(s) to one equivalent RT bronchodilator order with QID Frequency and an Albuterol order with Frequency of every 4 hours PRN wheezing or increased work of breathing using Per Protocol order mode. Greater than 13 - enter or revise RT Bronchodilator order(s) to one equivalent RT bronchodilator order with every 4 hours Frequency and an Albuterol order with Frequency of every 2 hours PRN wheezing or increased work of breathing using Per Protocol order mode.          Electronically signed by Raymond Zepeda RCP on 3/29/2023 at 7:35 PM

## 2023-03-29 NOTE — PROGRESS NOTES
hours PRN for wheezing or increased work of breathing using Per Protocol order mode. 4-6 - enter or revise RT Bronchodilator order(s) to two equivalent RT bronchodilator orders with one order with BID Frequency and one order with Frequency of every 4 hours PRN wheezing or increased work of breathing using Per Protocol order mode. 7-10 - enter or revise RT Bronchodilator order(s) to two equivalent RT bronchodilator orders with one order with TID Frequency and one order with Frequency of every 4 hours PRN wheezing or increased work of breathing using Per Protocol order mode. 11-13 - enter or revise RT Bronchodilator order(s) to one equivalent RT bronchodilator order with QID Frequency and an Albuterol order with Frequency of every 4 hours PRN wheezing or increased work of breathing using Per Protocol order mode. Greater than 13 - enter or revise RT Bronchodilator order(s) to one equivalent RT bronchodilator order with every 4 hours Frequency and an Albuterol order with Frequency of every 2 hours PRN wheezing or increased work of breathing using Per Protocol order mode. RT to enter RT Home Evaluation for COPD & MDI Assessment order using Per Protocol order mode.     Electronically signed by Skip Denton RCP on 3/29/2023 at 7:35 AM

## 2023-03-29 NOTE — CARE COORDINATION
INTERDISCIPLINARY PLAN OF CARE CONFERENCE    Date/Time: 3/29/2023 12:11 PM  Completed by: Rylie Lewis RN, Case Management      Patient Name:  Lynette Obregon  YOB: 1955  Admitting Diagnosis: Cellulitis of right foot [L03.115]  Cellulitis of right lower extremity [L03.115]     Admit Date/Time:  3/27/2023 11:31 AM    Chart reviewed. Interdisciplinary team contacted or reviewed plan related to patient progress and discharge plans. Disciplines included Case Management, Nursing, and Dietitian. Current Status:INPT   PT/OT recommendation for discharge plan of care: n/a    Expected D/C Disposition:  Home  Confirmed plan with patient and/or family Yes confirmed with: (name) with pt  Met with:pt at bedside. Discharge Plan Comments: CM reviewed chart and met with pt. Pt states that he will return home at discharge and states that he is unsure of his discharge needs at this time. Per Dr. Tracey Booth, possible discharge home in two days on oral antibiotics. CM following. Home O2 in place on admit: Yes  Pt informed of need to bring portable home O2 tank on day of discharge for nursing to connect prior to leaving:  Yes  Verbalized agreement/Understanding:   Yes

## 2023-03-30 LAB
ANION GAP SERPL CALCULATED.3IONS-SCNC: 9 MMOL/L (ref 3–16)
BASOPHILS # BLD: 0 K/UL (ref 0–0.2)
BASOPHILS NFR BLD: 0 %
BUN SERPL-MCNC: 7 MG/DL (ref 7–20)
CALCIUM SERPL-MCNC: 8.9 MG/DL (ref 8.3–10.6)
CHLORIDE SERPL-SCNC: 101 MMOL/L (ref 99–110)
CO2 SERPL-SCNC: 29 MMOL/L (ref 21–32)
CREAT SERPL-MCNC: <0.5 MG/DL (ref 0.8–1.3)
DEPRECATED RDW RBC AUTO: 14.7 % (ref 12.4–15.4)
EOSINOPHIL # BLD: 0.2 K/UL (ref 0–0.6)
EOSINOPHIL NFR BLD: 3 %
GFR SERPLBLD CREATININE-BSD FMLA CKD-EPI: >60 ML/MIN/{1.73_M2}
GLUCOSE BLD-MCNC: 117 MG/DL (ref 70–99)
GLUCOSE BLD-MCNC: 118 MG/DL (ref 70–99)
GLUCOSE BLD-MCNC: 121 MG/DL (ref 70–99)
GLUCOSE BLD-MCNC: 99 MG/DL (ref 70–99)
GLUCOSE SERPL-MCNC: 113 MG/DL (ref 70–99)
HCT VFR BLD AUTO: 33.2 % (ref 40.5–52.5)
HGB BLD-MCNC: 11.2 G/DL (ref 13.5–17.5)
LYMPHOCYTES # BLD: 1.5 K/UL (ref 1–5.1)
LYMPHOCYTES NFR BLD: 21 %
MAGNESIUM SERPL-MCNC: 1.6 MG/DL (ref 1.8–2.4)
MCH RBC QN AUTO: 31 PG (ref 26–34)
MCHC RBC AUTO-ENTMCNC: 33.8 G/DL (ref 31–36)
MCV RBC AUTO: 91.6 FL (ref 80–100)
METAMYELOCYTES NFR BLD MANUAL: 1 %
MONOCYTES # BLD: 0.4 K/UL (ref 0–1.3)
MONOCYTES NFR BLD: 6 %
MYELOCYTES NFR BLD MANUAL: 1 %
NEUTROPHILS # BLD: 4.7 K/UL (ref 1.7–7.7)
NEUTROPHILS NFR BLD: 67 %
PERFORMED ON: ABNORMAL
PERFORMED ON: NORMAL
PLATELET # BLD AUTO: 304 K/UL (ref 135–450)
PLATELET BLD QL SMEAR: ADEQUATE
PMV BLD AUTO: 7.8 FL (ref 5–10.5)
POTASSIUM SERPL-SCNC: 3.5 MMOL/L (ref 3.5–5.1)
RBC # BLD AUTO: 3.63 M/UL (ref 4.2–5.9)
SLIDE REVIEW: ABNORMAL
SODIUM SERPL-SCNC: 139 MMOL/L (ref 136–145)
VARIANT LYMPHS NFR BLD MANUAL: 1 % (ref 0–6)
WBC # BLD AUTO: 6.8 K/UL (ref 4–11)

## 2023-03-30 PROCEDURE — 6360000002 HC RX W HCPCS: Performed by: NURSE PRACTITIONER

## 2023-03-30 PROCEDURE — 83735 ASSAY OF MAGNESIUM: CPT

## 2023-03-30 PROCEDURE — 1200000000 HC SEMI PRIVATE

## 2023-03-30 PROCEDURE — 6370000000 HC RX 637 (ALT 250 FOR IP): Performed by: NURSE PRACTITIONER

## 2023-03-30 PROCEDURE — 85025 COMPLETE CBC W/AUTO DIFF WBC: CPT

## 2023-03-30 PROCEDURE — 2580000003 HC RX 258: Performed by: NURSE PRACTITIONER

## 2023-03-30 PROCEDURE — 99232 SBSQ HOSP IP/OBS MODERATE 35: CPT | Performed by: INTERNAL MEDICINE

## 2023-03-30 PROCEDURE — 94761 N-INVAS EAR/PLS OXIMETRY MLT: CPT

## 2023-03-30 PROCEDURE — 6360000002 HC RX W HCPCS

## 2023-03-30 PROCEDURE — 94640 AIRWAY INHALATION TREATMENT: CPT

## 2023-03-30 PROCEDURE — 80048 BASIC METABOLIC PNL TOTAL CA: CPT

## 2023-03-30 PROCEDURE — 6370000000 HC RX 637 (ALT 250 FOR IP): Performed by: INTERNAL MEDICINE

## 2023-03-30 PROCEDURE — 36415 COLL VENOUS BLD VENIPUNCTURE: CPT

## 2023-03-30 PROCEDURE — 2700000000 HC OXYGEN THERAPY PER DAY

## 2023-03-30 RX ADMIN — PANTOPRAZOLE SODIUM 40 MG: 40 TABLET, DELAYED RELEASE ORAL at 05:32

## 2023-03-30 RX ADMIN — ENOXAPARIN SODIUM 40 MG: 100 INJECTION SUBCUTANEOUS at 18:48

## 2023-03-30 RX ADMIN — INSULIN GLARGINE 12 UNITS: 100 INJECTION, SOLUTION SUBCUTANEOUS at 18:47

## 2023-03-30 RX ADMIN — Medication 2 PUFF: at 07:24

## 2023-03-30 RX ADMIN — FOLIC ACID 1000 MCG: 1 TABLET ORAL at 09:18

## 2023-03-30 RX ADMIN — VANCOMYCIN HYDROCHLORIDE 1250 MG: 10 INJECTION, POWDER, LYOPHILIZED, FOR SOLUTION INTRAVENOUS at 14:59

## 2023-03-30 RX ADMIN — Medication 2 PUFF: at 20:15

## 2023-03-30 RX ADMIN — Medication 100 MG: at 09:18

## 2023-03-30 RX ADMIN — DULOXETINE HYDROCHLORIDE 60 MG: 60 CAPSULE, DELAYED RELEASE ORAL at 09:18

## 2023-03-30 RX ADMIN — FERROUS SULFATE TAB 325 MG (65 MG ELEMENTAL FE) 325 MG: 325 (65 FE) TAB at 09:18

## 2023-03-30 RX ADMIN — Medication 2 PUFF: at 20:12

## 2023-03-30 RX ADMIN — DILTIAZEM HYDROCHLORIDE 360 MG: 180 CAPSULE, COATED, EXTENDED RELEASE ORAL at 09:18

## 2023-03-30 RX ADMIN — GABAPENTIN 900 MG: 300 CAPSULE ORAL at 20:07

## 2023-03-30 RX ADMIN — SODIUM CHLORIDE: 9 INJECTION, SOLUTION INTRAVENOUS at 15:11

## 2023-03-30 RX ADMIN — GABAPENTIN 900 MG: 300 CAPSULE ORAL at 14:45

## 2023-03-30 RX ADMIN — ATORVASTATIN CALCIUM 40 MG: 40 TABLET, FILM COATED ORAL at 09:18

## 2023-03-30 RX ADMIN — METOPROLOL SUCCINATE 50 MG: 50 TABLET, EXTENDED RELEASE ORAL at 09:18

## 2023-03-30 RX ADMIN — GABAPENTIN 900 MG: 300 CAPSULE ORAL at 09:18

## 2023-03-30 RX ADMIN — Medication 10 ML: at 20:07

## 2023-03-30 RX ADMIN — ALLOPURINOL 300 MG: 300 TABLET ORAL at 09:18

## 2023-03-30 RX ADMIN — FUROSEMIDE 20 MG: 20 TABLET ORAL at 09:18

## 2023-03-30 RX ADMIN — CEFEPIME 2000 MG: 2 INJECTION, POWDER, FOR SOLUTION INTRAVENOUS at 01:52

## 2023-03-30 RX ADMIN — MAGNESIUM SULFATE HEPTAHYDRATE 2000 MG: 40 INJECTION, SOLUTION INTRAVENOUS at 09:22

## 2023-03-30 RX ADMIN — VANCOMYCIN HYDROCHLORIDE 1250 MG: 10 INJECTION, POWDER, LYOPHILIZED, FOR SOLUTION INTRAVENOUS at 00:17

## 2023-03-30 RX ADMIN — CEFEPIME 2000 MG: 2 INJECTION, POWDER, FOR SOLUTION INTRAVENOUS at 15:14

## 2023-03-30 ASSESSMENT — PAIN SCALES - GENERAL: PAINLEVEL_OUTOF10: 4

## 2023-03-30 ASSESSMENT — PAIN DESCRIPTION - LOCATION: LOCATION: KNEE;FOOT

## 2023-03-30 NOTE — PROGRESS NOTES
Vancomycin Day: 4/7  Current Regimen: 1250 mg IV every 12 hours    Patient's labs, cultures, vitals, and vancomycin regimen reviewed.    SCr stable  U/O not measured/well documented  [facility] updated    Plan:   Order level for 3/31 at [address] D 2/[id number] AM  .

## 2023-03-30 NOTE — PLAN OF CARE
Problem: Discharge Planning  Goal: Discharge to home or other facility with appropriate resources  3/30/2023 0940 by Magalys Velasco RN  Outcome: Progressing  3/30/2023 0204 by Maite Vasquez RN  Outcome: Progressing     Problem: Pain  Goal: Verbalizes/displays adequate comfort level or baseline comfort level  3/30/2023 0940 by Magalys Velasco RN  Outcome: Progressing  3/30/2023 0204 by Maite Vasquez RN  Outcome: Progressing     Problem: Safety - Adult  Goal: Free from fall injury  3/30/2023 0940 by Magalys Velasco RN  Outcome: Progressing  3/30/2023 0204 by Maite Vasquez RN  Outcome: Progressing

## 2023-03-30 NOTE — PLAN OF CARE
Problem: Pain  Goal: Verbalizes/displays adequate comfort level or baseline comfort level  3/30/2023 0204 by Tutu Riddle RN  Outcome: Progressing  3/29/2023 1434 by Ramy Alfred RN  Outcome: Progressing     Problem: Safety - Adult  Goal: Free from fall injury  3/30/2023 0204 by Tutu Riddle RN  Outcome: Progressing  3/29/2023 1434 by Ramy Alfred RN  Outcome: Progressing

## 2023-03-30 NOTE — PROGRESS NOTES
RT Inhaler-Nebulizer Bronchodilator Protocol Note    There is a bronchodilator order in the chart from a provider indicating to follow the RT Bronchodilator Protocol and there is an Initiate RT Inhaler-Nebulizer Bronchodilator Protocol order as well (see protocol at bottom of note). CXR Findings:  No results found. The findings from the last RT Protocol Assessment were as follows:   History Pulmonary Disease: (P) Chronic pulmonary disease  Respiratory Pattern: (P) Regular pattern and RR 12-20 bpm  Breath Sounds: (P) Slightly diminished and/or crackles  Cough: (P) Strong, spontaneous, non-productive  Indication for Bronchodilator Therapy: (P) Decreased or absent breath sounds  Bronchodilator Assessment Score: (P) 4    Aerosolized bronchodilator medication orders have been revised according to the RT Inhaler-Nebulizer Bronchodilator Protocol below. Respiratory Therapist to perform RT Therapy Protocol Assessment initially then follow the protocol. Repeat RT Therapy Protocol Assessment PRN for score 0-3 or on second treatment, BID, and PRN for scores above 3. No Indications - adjust the frequency to every 6 hours PRN wheezing or bronchospasm, if no treatments needed after 48 hours then discontinue using Per Protocol order mode. If indication present, adjust the RT bronchodilator orders based on the Bronchodilator Assessment Score as indicated below. Use Inhaler orders unless patient has one or more of the following: on home nebulizer, not able to hold breath for 10 seconds, is not alert and oriented, cannot activate and use MDI correctly, or respiratory rate 25 breaths per minute or more, then use the equivalent nebulizer order(s) with same Frequency and PRN reasons based on the score. If a patient is on this medication at home then do not decrease Frequency below that used at home.     0-3 - enter or revise RT bronchodilator order(s) to equivalent RT Bronchodilator order with Frequency of every 4 hours PRN for wheezing or increased work of breathing using Per Protocol order mode. 4-6 - enter or revise RT Bronchodilator order(s) to two equivalent RT bronchodilator orders with one order with BID Frequency and one order with Frequency of every 4 hours PRN wheezing or increased work of breathing using Per Protocol order mode. 7-10 - enter or revise RT Bronchodilator order(s) to two equivalent RT bronchodilator orders with one order with TID Frequency and one order with Frequency of every 4 hours PRN wheezing or increased work of breathing using Per Protocol order mode. 11-13 - enter or revise RT Bronchodilator order(s) to one equivalent RT bronchodilator order with QID Frequency and an Albuterol order with Frequency of every 4 hours PRN wheezing or increased work of breathing using Per Protocol order mode. Greater than 13 - enter or revise RT Bronchodilator order(s) to one equivalent RT bronchodilator order with every 4 hours Frequency and an Albuterol order with Frequency of every 2 hours PRN wheezing or increased work of breathing using Per Protocol order mode.          Electronically signed by Pepe Barrios RCP on 3/30/2023 at 7:38 AM

## 2023-03-30 NOTE — PROGRESS NOTES
tissue swelling mid-forefoot   consistent with contusion, edema, cellulitis, atherosclerotic calcification   visualized arteries distal right leg and foot of uncertain hemodynamic   significance. Lower extremity Doppler study   There is no evidence of deep or superficial venous thrombosis in the right    lower extremity or left common femoral vein. Assessment:  Principal Problem:    Cellulitis of right foot  Active Problems:    Chronic obstructive pulmonary disease (HCC)    Type 2 diabetes mellitus without complication, with long-term current use of insulin (HCC)    PVD (peripheral vascular disease) (HCC)    Cellulitis of right lower extremity  Resolved Problems:    * No resolved hospital problems. *      Plan:    #Right lower extremity cellulitis   -afebrile, no WBC  -procalc elevated   -xray as above   -venous doppler  negative for DVT  -blood cultures negative so far  -oxycodone prn for pain   -Continue IV vancomycin and cefepime D #4. Right leg cellulitis and edema is improving, keep leg elevated, continue IV antibiotics today. Plan discharge home on oral antibiotics tomorrow     #Hypokalemia   #Hypomagnesemia   -replaced      #COPD without AE   #Chronic hypoxic respiratory failure   -is on 2 L O2 nightly  -prn inhalers      #DM type 2   #Neuropathy   -on gabapentin   -on lantus   -SSI   -monitor BG      #Hx of alcoholic liver cirrhosis    -does not appear to have any acute decompensation   -reportedly has not drank alcohol in 1 month   -on lasix  -LFTs stable   -follow up with GI outpatient      #Hx of right arm cellulitis   -healing skin lesion   -local wound care      #HTN   -on toprol XL, cardizem, lasix      #HLD   -on statin      #Gout   -on allopurinol     #PVD   -on EMR   -on statin   -had normal arterial duplexes in 2020  -follow up with vascular      DVT Prophylaxis: Lovenox   ADULT DIET;  Regular; 4 carb choices (60 gm/meal)   Full Code           Osmar Chiang MD   3/30/2023 3:38 PM

## 2023-03-30 NOTE — PLAN OF CARE
Problem: Pain  Goal: Verbalizes/displays adequate comfort level or baseline comfort level  3/30/2023 1939 by Rio Joseph RN  Outcome: Progressing  3/30/2023 0940 by Manish Freeman RN  Outcome: Progressing     Problem: Safety - Adult  Goal: Free from fall injury  3/30/2023 1939 by Rio Joseph RN  Outcome: Progressing  3/30/2023 0940 by Manish Freeman RN  Outcome: Progressing

## 2023-03-30 NOTE — FLOWSHEET NOTE
03/29/23 1935   Vital Signs   Temp 97.7 °F (36.5 °C)   Temp Source Axillary   Heart Rate 71   Heart Rate Source Monitor   Resp 18   /61   MAP (Calculated) 84   BP Location Right upper arm   BP Method Automatic   Patient Position Semi fowlers   Level of Consciousness 0   MEWS Score 1   Oxygen Therapy   SpO2 94 %   O2 Device Nasal cannula   O2 Flow Rate (L/min) 2 L/min   Assessment complete, see flowsheets. Pt resting in bed, denies further needs at this time but is aware to call for any needs or changes. Call light within reach. Will continue to monitor.   Iker Oswald RN

## 2023-03-30 NOTE — PROGRESS NOTES
Bedside report given and pt care transferred to [address]. Pt denies needs at this time. Call light within reach.

## 2023-03-30 NOTE — FLOWSHEET NOTE
03/30/23 0726 03/30/23 0900   Vital Signs   Heart Rate 69 78   Resp 16  --    Oxygen Therapy   SpO2 97 %  --    O2 Device Nasal cannula  --    O2 Flow Rate (L/min) 2 L/min  --    Patient Observation   Observations mouth rinsed post MDI  --    Pt a/o. Am assessment completed see flow sheet. Pt denies any pain/ needs at this time. Call light within reach. Will continue to monitor. Bedside Mobility Assessment Tool (BMAT):     Assessment Level 1- Sit and Shake    1. From a semi-reclined position, ask patient to sit up and rotate to a seated position at the side of the bed. Can use the bedrail. 2. Ask patient to reach out and grab your hand and shake making sure patient reaches across his/her midline. Pass- Patient is able to come to a seated position, maintain core strength. Maintains seated balance while reaching across midline. Move on to Assessment Level 2. Assessment Level 2- Stretch and Point   1. With patient in seated position at the side of the bed, have patient place both feet on the floor (or stool) with knees no higher than hips. 2. Ask patient to stretch one leg and straighten the knee, then bend the ankle/flex and point the toes. If appropriate, repeat with the other leg. Pass- Patient is able to demonstrate appropriate quad strength on intended weight bearing limb(s). Move onto Assessment Level 3. Assessment Level 3- Stand   1. Ask patient to elevate off the bed or chair (seated to standing) using an assistive device (cane, bedrail). 2. Patient should be able to raise buttocks off be and hold for a count of five. May repeat once. Pass- Patient maintains standing stability for at least 5 seconds, proceed to assessment level 4. Assessment Level 4- Walk   1. Ask patient to march in place at bedside. 2. Then ask patient to advance step and return each foot. Some medical conditions may render a patient from stepping backwards, use your best clinical judgement.    Pass- Patient

## 2023-03-31 VITALS
SYSTOLIC BLOOD PRESSURE: 135 MMHG | RESPIRATION RATE: 18 BRPM | OXYGEN SATURATION: 95 % | BODY MASS INDEX: 32.28 KG/M2 | HEART RATE: 59 BPM | DIASTOLIC BLOOD PRESSURE: 62 MMHG | WEIGHT: 205.7 LBS | TEMPERATURE: 97 F | HEIGHT: 67 IN

## 2023-03-31 LAB
ANION GAP SERPL CALCULATED.3IONS-SCNC: 10 MMOL/L (ref 3–16)
BACTERIA BLD CULT ORG #2: NORMAL
BACTERIA BLD CULT: NORMAL
BUN SERPL-MCNC: 7 MG/DL (ref 7–20)
CALCIUM SERPL-MCNC: 8.9 MG/DL (ref 8.3–10.6)
CHLORIDE SERPL-SCNC: 99 MMOL/L (ref 99–110)
CO2 SERPL-SCNC: 29 MMOL/L (ref 21–32)
CREAT SERPL-MCNC: 0.6 MG/DL (ref 0.8–1.3)
GFR SERPLBLD CREATININE-BSD FMLA CKD-EPI: >60 ML/MIN/{1.73_M2}
GLUCOSE BLD-MCNC: 109 MG/DL (ref 70–99)
GLUCOSE BLD-MCNC: 128 MG/DL (ref 70–99)
GLUCOSE BLD-MCNC: 129 MG/DL (ref 70–99)
GLUCOSE SERPL-MCNC: 144 MG/DL (ref 70–99)
MAGNESIUM SERPL-MCNC: 1.8 MG/DL (ref 1.8–2.4)
PERFORMED ON: ABNORMAL
POTASSIUM SERPL-SCNC: 3.5 MMOL/L (ref 3.5–5.1)
SODIUM SERPL-SCNC: 138 MMOL/L (ref 136–145)
VANCOMYCIN TROUGH SERPL-MCNC: 14.1 UG/ML (ref 10–20)

## 2023-03-31 PROCEDURE — 36415 COLL VENOUS BLD VENIPUNCTURE: CPT

## 2023-03-31 PROCEDURE — 2700000000 HC OXYGEN THERAPY PER DAY

## 2023-03-31 PROCEDURE — 6370000000 HC RX 637 (ALT 250 FOR IP): Performed by: NURSE PRACTITIONER

## 2023-03-31 PROCEDURE — 6360000002 HC RX W HCPCS: Performed by: NURSE PRACTITIONER

## 2023-03-31 PROCEDURE — 80202 ASSAY OF VANCOMYCIN: CPT

## 2023-03-31 PROCEDURE — 94640 AIRWAY INHALATION TREATMENT: CPT

## 2023-03-31 PROCEDURE — 80048 BASIC METABOLIC PNL TOTAL CA: CPT

## 2023-03-31 PROCEDURE — 94761 N-INVAS EAR/PLS OXIMETRY MLT: CPT

## 2023-03-31 PROCEDURE — 83735 ASSAY OF MAGNESIUM: CPT

## 2023-03-31 PROCEDURE — 2580000003 HC RX 258: Performed by: NURSE PRACTITIONER

## 2023-03-31 RX ORDER — DOXYCYCLINE HYCLATE 100 MG
100 TABLET ORAL 2 TIMES DAILY
Qty: 14 TABLET | Refills: 0 | Status: SHIPPED | OUTPATIENT
Start: 2023-03-31 | End: 2023-04-05 | Stop reason: SDUPTHER

## 2023-03-31 RX ORDER — CEFUROXIME AXETIL 500 MG/1
500 TABLET ORAL 2 TIMES DAILY
Qty: 14 TABLET | Refills: 0 | Status: SHIPPED | OUTPATIENT
Start: 2023-03-31 | End: 2023-04-05 | Stop reason: SDUPTHER

## 2023-03-31 RX ADMIN — GABAPENTIN 900 MG: 300 CAPSULE ORAL at 08:28

## 2023-03-31 RX ADMIN — Medication 1500 MG: at 13:17

## 2023-03-31 RX ADMIN — DILTIAZEM HYDROCHLORIDE 360 MG: 180 CAPSULE, COATED, EXTENDED RELEASE ORAL at 08:28

## 2023-03-31 RX ADMIN — ALLOPURINOL 300 MG: 300 TABLET ORAL at 08:28

## 2023-03-31 RX ADMIN — CEFEPIME 2000 MG: 2 INJECTION, POWDER, FOR SOLUTION INTRAVENOUS at 00:26

## 2023-03-31 RX ADMIN — FOLIC ACID 1000 MCG: 1 TABLET ORAL at 08:28

## 2023-03-31 RX ADMIN — Medication 2 PUFF: at 07:50

## 2023-03-31 RX ADMIN — DULOXETINE HYDROCHLORIDE 60 MG: 60 CAPSULE, DELAYED RELEASE ORAL at 08:28

## 2023-03-31 RX ADMIN — METOPROLOL SUCCINATE 50 MG: 50 TABLET, EXTENDED RELEASE ORAL at 08:28

## 2023-03-31 RX ADMIN — Medication 2 PUFF: at 07:54

## 2023-03-31 RX ADMIN — FUROSEMIDE 20 MG: 20 TABLET ORAL at 08:28

## 2023-03-31 RX ADMIN — PANTOPRAZOLE SODIUM 40 MG: 40 TABLET, DELAYED RELEASE ORAL at 04:41

## 2023-03-31 RX ADMIN — Medication 100 MG: at 08:28

## 2023-03-31 RX ADMIN — FERROUS SULFATE TAB 325 MG (65 MG ELEMENTAL FE) 325 MG: 325 (65 FE) TAB at 08:28

## 2023-03-31 RX ADMIN — Medication 10 ML: at 08:29

## 2023-03-31 RX ADMIN — GABAPENTIN 900 MG: 300 CAPSULE ORAL at 13:16

## 2023-03-31 RX ADMIN — ATORVASTATIN CALCIUM 40 MG: 40 TABLET, FILM COATED ORAL at 08:28

## 2023-03-31 RX ADMIN — Medication 1500 MG: at 01:30

## 2023-03-31 ASSESSMENT — PAIN DESCRIPTION - ORIENTATION: ORIENTATION: RIGHT

## 2023-03-31 ASSESSMENT — PAIN SCALES - GENERAL: PAINLEVEL_OUTOF10: 6

## 2023-03-31 ASSESSMENT — PAIN DESCRIPTION - LOCATION: LOCATION: KNEE;LEG

## 2023-03-31 ASSESSMENT — PAIN DESCRIPTION - DESCRIPTORS: DESCRIPTORS: ACHING;BURNING

## 2023-03-31 NOTE — PROGRESS NOTES
Vancomycin trough on 1250 mg q12h = 14.1. Current dose yields an estimated AUC of 421. Will increase dose to 1500 mg q12h. New projected AUC = 504 with a trough at steady state of 14.6 mcg/ml.

## 2023-03-31 NOTE — DISCHARGE INSTR - DIET

## 2023-03-31 NOTE — CARE COORDINATION
CM delivered 2nd IMM and provided verbal explanation at bedside at 9:22am. Pt voiced understanding of discharge MCR rights and is agreeable to discharge within 4 hours of delivery of the IMM notice.     MARK Delgado  Case Management Department  Phone: [phone] Fax: [phone]

## 2023-03-31 NOTE — PROGRESS NOTES
Inhalation BID       Continuous Infusions:   sodium chloride 5 mL/hr at 03/30/23 1511    dextrose         Data:  CBC:   Recent Labs     03/29/23  0515 03/30/23  0551   WBC 7.5 6.8   RBC 3.51* 3.63*   HGB 10.9* 11.2*   HCT 32.2* 33.2*   MCV 91.9 91.6   RDW 14.3 14.7    304       BMP:   Recent Labs     03/29/23  0515 03/30/23  0551 03/31/23  0517    139 138   K 3.2* 3.5 3.5   CL 99 101 99   CO2 31 29 29   BUN 8 7 7   CREATININE 0.6* <0.5* 0.6*       BNP: No results for input(s): BNP in the last 72 hours. PT/INR: No results for input(s): PROTIME, INR in the last 72 hours. APTT: No results for input(s): APTT in the last 72 hours. CARDIAC ENZYMES: No results for input(s): CKMB, CKMBINDEX, TROPONINI in the last 72 hours. Invalid input(s): CKTOTAL;3  FASTING LIPID PANEL:  Lab Results   Component Value Date    CHOL 154 07/11/2022    HDL 46 07/11/2022    TRIG 111 07/11/2022     LIVER PROFILE:   No results for input(s): AST, ALT, ALB, BILIDIR, BILITOT, ALKPHOS in the last 72 hours. Cultures  COVID not detected  Blood cultures pending    Radiology  VL Extremity Venous Right   Final Result      XR TIBIA FIBULA RIGHT (2 VIEWS)   Final Result   Stable expansile process proximal fibular diaphysis compared to prior studies   dating back to 10/03/2017 suggesting benign process. Subjective soft tissue changes consistent with contusion, edema, cellulitis   right lower leg, atherosclerotic calcification visualized arteries of the   right lower leg of uncertain hemodynamic significance, moderate-advanced   degenerative arthritic change medial tibiofemoral joint space and to a lesser   degree about the right ankle. XR FOOT RIGHT (MIN 3 VIEWS)   Final Result   Polyarticular moderate degenerative arthritic change worst in the 1st   metatarsal-phalangeal joint and to a lesser degree in the interphalangeal   joints and midfoot articulations.   Soft tissue swelling mid-forefoot   consistent with contusion, edema, cellulitis, atherosclerotic calcification   visualized arteries distal right leg and foot of uncertain hemodynamic   significance. Lower extremity Doppler study   There is no evidence of deep or superficial venous thrombosis in the right    lower extremity or left common femoral vein. Assessment:  Principal Problem:    Cellulitis of right foot  Active Problems:    Chronic obstructive pulmonary disease (HCC)    Type 2 diabetes mellitus without complication, with long-term current use of insulin (HCC)    PVD (peripheral vascular disease) (HCC)    Cellulitis of right lower extremity  Resolved Problems:    * No resolved hospital problems. *      Plan:    #Right lower extremity cellulitis   -afebrile, no WBC  -procalc elevated   -xray as above   -venous doppler  negative for DVT  -blood cultures negative so far  -oxycodone prn for pain   -Continue IV vancomycin and cefepime D #4. Right leg cellulitis and edema is improving, keep leg elevated, continue IV antibiotics today. Plan discharge home on oral antibiotics tomorrow     #Hypokalemia   #Hypomagnesemia   -replaced      #COPD without AE   #Chronic hypoxic respiratory failure   -is on 2 L O2 nightly  -prn inhalers      #DM type 2   #Neuropathy   -on gabapentin   -on lantus   -SSI   -monitor BG      #Hx of alcoholic liver cirrhosis    -does not appear to have any acute decompensation   -reportedly has not drank alcohol in 1 month   -on lasix  -LFTs stable   -follow up with GI outpatient      #Hx of right arm cellulitis   -healing skin lesion   -local wound care      #HTN   -on toprol XL, cardizem, lasix      #HLD   -on statin      #Gout   -on allopurinol     #PVD   -on EMR   -on statin   -had normal arterial duplexes in 2020  -follow up with vascular      DVT Prophylaxis: Lovenox   ADULT DIET;  Regular; 4 carb choices (60 gm/meal)   Full Code    Improved  DC On oral antibiotics today      Kathleen Abad MD   3/31/2023 1:44

## 2023-03-31 NOTE — PROGRESS NOTES
Pt leaving via personal vehicle to home. Discharge instructions given. Pt voiced understanding. Copy of discharge and scripts with patient. Iv removed. CP and PE completed. No further needs at discharge. Pt leaving with all personal belonging. Awaiting patient ride.  Meds to beds were delivered

## 2023-03-31 NOTE — FLOWSHEET NOTE
03/30/23 1931   Vital Signs   Temp 97.2 °F (36.2 °C)   Temp Source Oral   Heart Rate 69   Heart Rate Source Monitor   Resp 18   BP (!) 142/75   MAP (Calculated) 97   BP Location Right upper arm   BP Method Automatic   Patient Position Semi fowlers   Level of Consciousness 0   MEWS Score 1   Oxygen Therapy   SpO2 94 %   O2 Device Nasal cannula   O2 Flow Rate (L/min) 2 L/min   Assessment complete- see flowsheets. Pt resting in bed, denies further needs at this time but aware to call for any needs or changes. Call light within reach. Will continue to monitor.   Karla Matamoros RN

## 2023-03-31 NOTE — DISCHARGE SUMMARY
visualized arteries distal right leg and foot of uncertain hemodynamic   significance. Discharge Medications     Medication List        START taking these medications      cefUROXime 500 MG tablet  Commonly known as: CEFTIN  Take 1 tablet by mouth 2 times daily for 7 days     doxycycline hyclate 100 MG tablet  Commonly known as: VIBRA-TABS  Take 1 tablet by mouth 2 times daily for 7 days            CHANGE how you take these medications      Lantus SoloStar 100 UNIT/ML injection pen  Generic drug: insulin glargine  INJECT 12 UNITS INTO THE SKIN DAILY WITH SUPPER  What changed:   how much to take  how to take this     oxyCODONE 5 MG immediate release tablet  Commonly known as: Abhinav Norman  What changed: Another medication with the same name was removed. Continue taking this medication, and follow the directions you see here.             CONTINUE taking these medications      albuterol sulfate  (90 Base) MCG/ACT inhaler  Commonly known as: PROVENTIL;VENTOLIN;PROAIR  INHALE 2 PUFFS EVERY 6 HOURS AS NEEDED FOR WHEEZING     Alcohol Swabs Pads  USE AS DIRECTED TO TEST AND INSULIN     allopurinol 300 MG tablet  Commonly known as: ZYLOPRIM  TAKE 1 TABLET BY MOUTH DAILY     atorvastatin 40 MG tablet  Commonly known as: LIPITOR  TAKE 1 TABLET BY MOUTH DAILY     Blood Pressure Cuff Misc  Please dispense insurance preference     dilTIAZem 180 MG extended release capsule  Commonly known as: CARDIZEM CD  TAKE 2 CAPSULES ONCE DAILY     DULoxetine 60 MG extended release capsule  Commonly known as: CYMBALTA  TAKE ONE CAPSULE BY MOUTH EVERY DAY     FeroSul 325 (65 Fe) MG tablet  Generic drug: ferrous sulfate  TAKE 1 TABLET BY MOUTH DAILY WITH BREAKFAST     folic acid 1 MG tablet  Commonly known as: FOLVITE  TAKE 1 TABLET BY MOUTH DAILY     FreeStyle Freedom Lite w/Device Kit  1 kit by Does not apply route daily     furosemide 20 MG tablet  Commonly known as: LASIX  TAKE 1 TABLET BY MOUTH DAILY     gabapentin 300 MG capsule  Commonly known as: NEURONTIN     ipratropium-albuterol 0.5-2.5 (3) MG/3ML Soln nebulizer solution  Commonly known as: DUONEB     metoprolol succinate 50 MG extended release tablet  Commonly known as: TOPROL XL  TAKE ONE TABLET BY MOUTH EVERY DAY     Misc. Devices Misc  Please provide with portable oxygen concentrator that patient can carry across his shoulders     ondansetron 4 MG tablet  Commonly known as: ZOFRAN  Take 1 tablet by mouth every 6 hours as needed for Nausea or Vomiting     pantoprazole 40 MG tablet  Commonly known as: PROTONIX  TAKE ONE TABLET BY MOUTH EVERY MORNING BEFORE BREAKFAST     PenTips 31G X 8 MM Misc  Generic drug: Insulin Pen Needle  USE ONCE DAILY WITH INJECTION     thiamine 100 MG tablet  TAKE 1 TABLET BY MOUTH DAILY     Trelegy Ellipta 100-62.5-25 MCG/ACT Aepb inhaler  Generic drug: fluticasone-umeclidin-vilant  Inhale 1 puff into the lungs in the morning. True Metrix Blood Glucose Test strip  Generic drug: blood glucose test strips  USE 1 STRIP IN VITRO ROUTE 2 TIMES DAILY     TRUEplus Lancets 30G Misc  TEST 2 TIMES DAILY     Vitamin D3 Ultra Strength 125 MCG (5000 UT) Caps capsule  Generic drug: vitamin D  Take 1 capsule by mouth daily            STOP taking these medications      nicotine polacrilex 2 MG gum  Commonly known as: Nicorette               Where to Get Your Medications        These medications were sent to [facility], [address], [address] [address]      Phone: [phone]   cefUROXime 500 MG tablet  doxycycline hyclate 100 MG tablet           Discharged in stable condition to ***    Follow Up:   Follow up with PCP in 1 week***/ physician at [place]      ***

## 2023-03-31 NOTE — PROGRESS NOTES
on this medication at home then do not decrease Frequency below that used at home. 0-3 - enter or revise RT bronchodilator order(s) to equivalent RT Bronchodilator order with Frequency of every 4 hours PRN for wheezing or increased work of breathing using Per Protocol order mode. 4-6 - enter or revise RT Bronchodilator order(s) to two equivalent RT bronchodilator orders with one order with BID Frequency and one order with Frequency of every 4 hours PRN wheezing or increased work of breathing using Per Protocol order mode. 7-10 - enter or revise RT Bronchodilator order(s) to two equivalent RT bronchodilator orders with one order with TID Frequency and one order with Frequency of every 4 hours PRN wheezing or increased work of breathing using Per Protocol order mode. 11-13 - enter or revise RT Bronchodilator order(s) to one equivalent RT bronchodilator order with QID Frequency and an Albuterol order with Frequency of every 4 hours PRN wheezing or increased work of breathing using Per Protocol order mode. Greater than 13 - enter or revise RT Bronchodilator order(s) to one equivalent RT bronchodilator order with every 4 hours Frequency and an Albuterol order with Frequency of every 2 hours PRN wheezing or increased work of breathing using Per Protocol order mode.        Electronically signed by Shanell Magaña RCP on 3/30/2023 at 8:17 PM

## 2023-03-31 NOTE — PROGRESS NOTES
/62   Pulse 59   Temp 97 °F (36.1 °C) (Oral)   Resp 18   Ht 5' 7\" (1.702 m)   Wt 205 lb 11.2 oz (93.3 kg)   SpO2 95%   BMI 32.22 kg/m²     Assessment complete. Meds passed. Pt denies needs at this time    Patient rates pain 6 out of 10 in right leg this morning. Patient does not want pain meds at this time. Bedside Mobility Assessment Tool (BMAT):     Assessment Level 1- Sit and Shake    1. From a semi-reclined position, ask patient to sit up and rotate to a seated position at the side of the bed. Can use the bedrail. 2. Ask patient to reach out and grab your hand and shake making sure patient reaches across his/her midline. Pass- Patient is able to come to a seated position, maintain core strength. Maintains seated balance while reaching across midline. Move on to Assessment Level 2. Assessment Level 2- Stretch and Point   1. With patient in seated position at the side of the bed, have patient place both feet on the floor (or stool) with knees no higher than hips. 2. Ask patient to stretch one leg and straighten the knee, then bend the ankle/flex and point the toes. If appropriate, repeat with the other leg. Pass- Patient is able to demonstrate appropriate quad strength on intended weight bearing limb(s). Move onto Assessment Level 3. Assessment Level 3- Stand   1. Ask patient to elevate off the bed or chair (seated to standing) using an assistive device (cane, bedrail). 2. Patient should be able to raise buttocks off be and hold for a count of five. May repeat once. Pass- Patient maintains standing stability for at least 5 seconds, proceed to assessment level 4. Assessment Level 4- Walk   1. Ask patient to march in place at bedside. 2. Then ask patient to advance step and return each foot. Some medical conditions may render a patient from stepping backwards, use your best clinical judgement.    Pass- Patient demonstrates balance while shifting weight and ability to

## 2023-03-31 NOTE — CARE COORDINATION
INTERDISCIPLINARY PLAN OF CARE CONFERENCE and discharge summary     Date/Time: 3/31/2023 9:29 AM  Completed by: MARK Read  Case Management Department  Phone: [phone] Fax: [phone]   Case Management      Patient Name:  Wendie Brady  YOB: 1955  Admitting Diagnosis: Cellulitis of right foot [L03.115]  Cellulitis of right lower extremity [B78.902]     Admit Date/Time:  3/27/2023 11:31 AM    Chart reviewed. Interdisciplinary team contacted or reviewed plan related to patient progress and discharge plans. Disciplines included Case Management, Nursing, and Dietitian. Current Status:ongoing   PT/OT recommendation for discharge plan of care: n/a    Expected D/C Disposition:  Home  Confirmed plan with patient and/or family Yes confirmed with: (name) pt  Met with:pt    Discharge Plan Comments: Chart review completed. Met with pt at bedside. Pt confirmed he is returning home when discharged. Discussed skilled home care for RN/PT/OT and pt declined stating he doesn't need this. He stated he will have a ride home when discharged and they will bring his portable o2 tank. He stated he wears o2 at 2 liters cont with Lead[facility] respiratory. He denied needs from CM. Per MD note yesterday, pt will discharge on oral ABX    Home O2 in place on admit: Yes    Addendum at 2:03pm: Discharge order noted. Met with pt at bedside. Pt confirmed he is returning home today and will have a ride home with his o2 tank which he will call. He denied needing skilled home care and all needs from CM. Lamar MEEKS aware. Date of Last IMM Given: today     Has Home O2 in place on admit:  Yes  Informed of need to bring portable home O2 tank on day of discharge for nursing to connect prior to leaving:   Yes  Verbalized agreement/Understanding:   Yes    Pt is being d/c'd to home today. Pt's O2 sats are 95% on 2 liters per vitals in epic. Discharge timeout done with [facility].  All discharge needs and concerns addressed.

## 2023-04-03 ENCOUNTER — TELEPHONE (OUTPATIENT)
Dept: FAMILY MEDICINE CLINIC | Age: 68
End: 2023-04-03

## 2023-04-03 NOTE — TELEPHONE ENCOUNTER
[facility] Transitions Initial Follow Up Call    Outreach made within 2 business days of discharge: Yes    Patient: Wilmer Cuellar Patient : 1955   MRN: [id number]  Reason for Admission: There are no discharge diagnoses documented for the most recent discharge.   Discharge Date: 3/31/23       Spoke with: No answer    Discharge department/facility: [facility]    Non-face-to-face services provided:  Scheduled appointment with PCP-     Future Appointments   Date Time Provider Alina Friend   2023  1:20 PM Angeline Jameson MD [facility] [place] - DYD   2023  1:30 PM Kayren Primrose, MD [facility]   2023 11:00 AM Angeline Jameson MD [place] [id number] Medical [facility]       Raina Carrera RN

## 2023-04-05 ENCOUNTER — OFFICE VISIT (OUTPATIENT)
Dept: FAMILY MEDICINE CLINIC | Age: 68
End: 2023-04-05

## 2023-04-05 VITALS
SYSTOLIC BLOOD PRESSURE: 116 MMHG | DIASTOLIC BLOOD PRESSURE: 80 MMHG | BODY MASS INDEX: 32.11 KG/M2 | OXYGEN SATURATION: 94 % | HEART RATE: 81 BPM | WEIGHT: 205 LBS

## 2023-04-05 DIAGNOSIS — S91.301S: Primary | ICD-10-CM

## 2023-04-05 DIAGNOSIS — E11.42 DIABETIC POLYNEUROPATHY ASSOCIATED WITH TYPE 2 DIABETES MELLITUS (HCC): ICD-10-CM

## 2023-04-05 DIAGNOSIS — I73.9 PAD (PERIPHERAL ARTERY DISEASE) (HCC): ICD-10-CM

## 2023-04-05 DIAGNOSIS — L03.115 CELLULITIS OF RIGHT FOOT: ICD-10-CM

## 2023-04-05 RX ORDER — DOXYCYCLINE HYCLATE 100 MG
100 TABLET ORAL 2 TIMES DAILY
Qty: 28 TABLET | Refills: 0 | Status: SHIPPED | OUTPATIENT
Start: 2023-04-05 | End: 2023-04-19

## 2023-04-05 RX ORDER — PEN NEEDLE, DIABETIC 31 GX5/16"
NEEDLE, DISPOSABLE MISCELLANEOUS
Qty: 100 EACH | Refills: 5 | Status: SHIPPED | OUTPATIENT
Start: 2023-04-05

## 2023-04-05 RX ORDER — INSULIN GLARGINE 100 [IU]/ML
INJECTION, SOLUTION SUBCUTANEOUS
Qty: 15 ML | Refills: 0 | Status: SHIPPED | OUTPATIENT
Start: 2023-04-05

## 2023-04-05 RX ORDER — CEFUROXIME AXETIL 500 MG/1
500 TABLET ORAL 2 TIMES DAILY
Qty: 28 TABLET | Refills: 0 | Status: SHIPPED | OUTPATIENT
Start: 2023-04-05 | End: 2023-04-19

## 2023-04-05 NOTE — PROGRESS NOTES
immediately with new or ongoing signs or symptoms or worsening, or proceed to the emergency room. No changes in past medical history, past surgical history, social history, or family history were noted during the patient encounter unless specifically listed above. All updates of past medical history, past surgical history, social history, or family history were reviewed personally by me during the office visit. All problems listed in the assessment are stable unless noted otherwise. Medication profile reviewed personally by me during the visit. Medication side effects and possible impairments from medications were discussed as applicable. Unless stated otherwise, we will continue current medications as listed in the medication review report contained in the patient's medical record. This document was prepared by a combination of typing and transcription through a voice recognition software. Medical Decision Making: moderate complexity  No follow-ups on file. Subjective:   HPI:  Follow up of Hospital problems/diagnosis(es): cellulitis    Inpatient course: Discharge summary reviewed- see chart. Interval history/Current status: patient was in [facility] 3/27-3/31 for right lower leg cellulitis. The leg continues to be very hot and red. Its better than it was though while he was admitted. He states his skin by his pinky toe has been turning purple and falling off and he has a red crusting wound.      Patient Active Problem List   Diagnosis    Hilar adenopathy    Chronic obstructive pulmonary disease (HCC)    GERD (gastroesophageal reflux disease)    Chest pain    Gout    Paresthesia of bilateral legs    Right knee pain    Numbness and tingling of right leg    Osteoarthritis of multiple joints    Supraclavicular fossa fullness    Carpal tunnel syndrome of left wrist    Alcohol abuse    Hyponatremia    Hepatomegaly    Chronic alcoholic hepatitis    Ulnar neuropathy at elbow    Chronic pain

## 2023-04-08 LAB — MRSA SPEC QL CULT: NORMAL

## 2023-04-14 PROBLEM — L97.511 DIABETIC ULCER OF TOE OF RIGHT FOOT ASSOCIATED WITH TYPE 2 DIABETES MELLITUS, LIMITED TO BREAKDOWN OF SKIN (HCC): Status: ACTIVE | Noted: 2023-04-14

## 2023-04-14 PROBLEM — E11.621 DIABETIC ULCER OF TOE OF RIGHT FOOT ASSOCIATED WITH TYPE 2 DIABETES MELLITUS, LIMITED TO BREAKDOWN OF SKIN (HCC): Status: ACTIVE | Noted: 2023-04-14

## 2023-04-14 PROBLEM — L30.9 DERMATITIS: Status: ACTIVE | Noted: 2023-04-14

## 2023-04-14 PROBLEM — L03.113 CELLULITIS OF RIGHT UPPER EXTREMITY: Status: RESOLVED | Noted: 2023-03-01 | Resolved: 2023-04-14

## 2023-04-14 PROBLEM — L84 CALLUS OF FOOT: Status: ACTIVE | Noted: 2023-04-14

## 2023-04-14 PROBLEM — L03.115 CELLULITIS OF RIGHT FOOT: Status: RESOLVED | Noted: 2023-03-27 | Resolved: 2023-04-14

## 2023-04-17 NOTE — DISCHARGE INSTRUCTIONS
[facility] Physician Orders and Discharge [facility]  [address], [address]  [place], [facility]  Telephone: [phone]      Fax: [phone]        Your home care company:   None . Your wound-care supplies will be provided by:  Self . NAME:  Socrates Winkler   YOB: 1955  PRIMARY DIAGNOSIS FOR WOUND CARE CENTER:  Diabetic/Trauma . Wound cleansing:   Do not scrub or use excessive force. Wash hands with soap and water before and after dressing changes. Prior to applying a clean dressing, cleanse wound with normal saline, wound cleanser, or mild soap and water. Ask your physician or nurse before getting the wound(s) wet in the shower. Wound care for home:     Right Forearm  Wash with gentle soap and water  Aquaphor or any moisturizer you prefer to dry area     Right Great Toe Calloused area  Wash with gentle soap and water  Aquaphor or any moisturizer you prefer to dry area     Between Right 4th and 5th Toe  Betadine - please give patient the remainder of bottle  Gauze/cast pad   Change daily         Please note, all wounds (unless stated otherwise here) were mechanically debrided at the time of cleansing here in the wound-care center today, so a small amount of pain, drainage or bleeding from that process might be expected, and is normal.      All products for home use, including multiple products for a single wound if applicable, are medically necessary in order to achieve the best chance at timely wound healing. See provider documentation for details if needed.      Substituted dressings applied in the [facility] today, if applicable:     N/a     New orders for this week (labs, imaging, medications, etc.):          Additional instructions for specific diagnoses:    General comments for diabetic / neuropathic ulcers:  *  Unless you've been instructed not to remove your dressings, be sure to inspect your feet daily, and notify

## 2023-04-21 ENCOUNTER — HOSPITAL ENCOUNTER (OUTPATIENT)
Dept: WOUND CARE | Age: 68
Discharge: HOME | End: 2023-04-21
Payer: MEDICARE

## 2023-04-21 VITALS
SYSTOLIC BLOOD PRESSURE: 137 MMHG | TEMPERATURE: 97.1 F | RESPIRATION RATE: 18 BRPM | HEIGHT: 67 IN | BODY MASS INDEX: 32.02 KG/M2 | DIASTOLIC BLOOD PRESSURE: 74 MMHG | HEART RATE: 93 BPM | WEIGHT: 204 LBS

## 2023-04-21 DIAGNOSIS — L84 CALLUS OF FOOT: ICD-10-CM

## 2023-04-21 DIAGNOSIS — L30.9 DERMATITIS: ICD-10-CM

## 2023-04-21 DIAGNOSIS — E11.621 DIABETIC ULCER OF TOE OF RIGHT FOOT ASSOCIATED WITH TYPE 2 DIABETES MELLITUS, LIMITED TO BREAKDOWN OF SKIN (HCC): ICD-10-CM

## 2023-04-21 DIAGNOSIS — L30.9 DERMATITIS: Primary | ICD-10-CM

## 2023-04-21 DIAGNOSIS — L97.511 DIABETIC ULCER OF TOE OF RIGHT FOOT ASSOCIATED WITH TYPE 2 DIABETES MELLITUS, LIMITED TO BREAKDOWN OF SKIN (HCC): ICD-10-CM

## 2023-04-21 PROCEDURE — 11055 PARING/CUTG B9 HYPRKER LES 1: CPT

## 2023-04-21 RX ORDER — LIDOCAINE 40 MG/G
CREAM TOPICAL ONCE
OUTPATIENT
Start: 2023-04-21 | End: 2023-04-21

## 2023-04-21 RX ORDER — LIDOCAINE 50 MG/G
OINTMENT TOPICAL ONCE
OUTPATIENT
Start: 2023-04-21 | End: 2023-04-21

## 2023-04-21 RX ORDER — LIDOCAINE 40 MG/G
CREAM TOPICAL ONCE
Status: DISCONTINUED | OUTPATIENT
Start: 2023-04-21 | End: 2023-04-22 | Stop reason: HOSPADM

## 2023-04-21 RX ORDER — BACITRACIN ZINC AND POLYMYXIN B SULFATE 500; 1000 [USP'U]/G; [USP'U]/G
OINTMENT TOPICAL ONCE
OUTPATIENT
Start: 2023-04-21 | End: 2023-04-21

## 2023-04-21 RX ORDER — LIDOCAINE HYDROCHLORIDE 40 MG/ML
SOLUTION TOPICAL ONCE
OUTPATIENT
Start: 2023-04-21 | End: 2023-04-21

## 2023-04-21 ASSESSMENT — PAIN DESCRIPTION - LOCATION: LOCATION: FOOT

## 2023-04-21 ASSESSMENT — PAIN - FUNCTIONAL ASSESSMENT: PAIN_FUNCTIONAL_ASSESSMENT: PREVENTS OR INTERFERES SOME ACTIVE ACTIVITIES AND ADLS

## 2023-04-21 ASSESSMENT — PAIN DESCRIPTION - DESCRIPTORS: DESCRIPTORS: TINGLING

## 2023-04-21 ASSESSMENT — PAIN DESCRIPTION - ORIENTATION: ORIENTATION: RIGHT

## 2023-04-21 ASSESSMENT — PAIN DESCRIPTION - PAIN TYPE: TYPE: CHRONIC PAIN

## 2023-04-21 ASSESSMENT — PAIN DESCRIPTION - ONSET: ONSET: ON-GOING

## 2023-04-21 ASSESSMENT — PAIN DESCRIPTION - FREQUENCY: FREQUENCY: CONTINUOUS

## 2023-04-21 NOTE — PLAN OF CARE
Patient seen in [address] [place] [address] today for follow up. He states he is feeling well overall. Denies pain. Right great toe callous paired per Dr. Jeannie Farrell. Left 5th toe healed today. Patient to follow up in [address] [place] [address],[address] as needed. Discharge instructions reviewed with patient, all questions answered, copy given to patient. Dressings were applied to all wounds per M.D. Instructions at this visit.

## 2023-04-26 RX ORDER — LANOLIN ALCOHOL/MO/W.PET/CERES
CREAM (GRAM) TOPICAL
Qty: 100 TABLET | Refills: 0 | Status: SHIPPED | OUTPATIENT
Start: 2023-04-26

## 2023-04-26 NOTE — TELEPHONE ENCOUNTER
Wilmer Cuellar is requesting refill(s)   Last OV 1/10/23 (pertaining to medication)  LR 12/9/22 (per medication requested)  Next office visit scheduled or attempted Yes   If no, reason:  7/12/23

## 2023-05-02 PROBLEM — R55 COUGH SYNCOPE: Status: RESOLVED | Noted: 2023-01-10 | Resolved: 2023-05-02

## 2023-05-02 PROBLEM — L30.9 DERMATITIS: Status: RESOLVED | Noted: 2023-04-14 | Resolved: 2023-05-02

## 2023-05-02 PROBLEM — R55 SYNCOPE AND COLLAPSE: Status: RESOLVED | Noted: 2023-03-02 | Resolved: 2023-05-02

## 2023-05-02 PROBLEM — E83.52 HYPERCALCEMIA: Status: RESOLVED | Noted: 2020-08-03 | Resolved: 2023-05-02

## 2023-05-02 PROBLEM — E11.10 DIABETIC KETOACIDOSIS WITHOUT COMA ASSOCIATED WITH TYPE 2 DIABETES MELLITUS (HCC): Status: RESOLVED | Noted: 2018-02-18 | Resolved: 2023-05-02

## 2023-05-02 PROBLEM — R05.4 COUGH SYNCOPE: Status: RESOLVED | Noted: 2023-01-10 | Resolved: 2023-05-02

## 2023-05-02 PROBLEM — K92.2 GI BLEED: Status: RESOLVED | Noted: 2022-05-08 | Resolved: 2023-05-02

## 2023-05-02 PROBLEM — E11.621 DIABETIC ULCER OF TOE OF RIGHT FOOT ASSOCIATED WITH TYPE 2 DIABETES MELLITUS, LIMITED TO BREAKDOWN OF SKIN (HCC): Status: RESOLVED | Noted: 2023-04-14 | Resolved: 2023-05-02

## 2023-05-02 PROBLEM — A41.9 SEPSIS DUE TO CELLULITIS (HCC): Status: RESOLVED | Noted: 2023-03-02 | Resolved: 2023-05-02

## 2023-05-02 PROBLEM — Z80.0 FAMILY HISTORY OF RECTAL CANCER: Status: RESOLVED | Noted: 2018-07-12 | Resolved: 2023-05-02

## 2023-05-02 PROBLEM — I74.09 OTHER ARTERIAL EMBOLISM AND THROMBOSIS OF ABDOMINAL AORTA (HCC): Status: RESOLVED | Noted: 2022-01-10 | Resolved: 2023-05-02

## 2023-05-02 PROBLEM — A41.9 SEPSIS (HCC): Status: RESOLVED | Noted: 2023-03-01 | Resolved: 2023-05-02

## 2023-05-02 PROBLEM — J44.1 COPD EXACERBATION (HCC): Status: RESOLVED | Noted: 2023-03-02 | Resolved: 2023-05-02

## 2023-05-02 PROBLEM — L97.511 DIABETIC ULCER OF TOE OF RIGHT FOOT ASSOCIATED WITH TYPE 2 DIABETES MELLITUS, LIMITED TO BREAKDOWN OF SKIN (HCC): Status: RESOLVED | Noted: 2023-04-14 | Resolved: 2023-05-02

## 2023-05-02 PROBLEM — I73.9 SEVERE CLAUDICATION (HCC): Status: RESOLVED | Noted: 2020-01-24 | Resolved: 2023-05-02

## 2023-05-02 PROBLEM — Z79.4 TYPE 2 DIABETES MELLITUS WITHOUT COMPLICATION, WITH LONG-TERM CURRENT USE OF INSULIN (HCC): Status: RESOLVED | Noted: 2018-02-18 | Resolved: 2023-05-02

## 2023-05-02 PROBLEM — L03.90 SEPSIS DUE TO CELLULITIS (HCC): Status: RESOLVED | Noted: 2023-03-02 | Resolved: 2023-05-02

## 2023-05-02 PROBLEM — Z72.0 TOBACCO ABUSE: Status: ACTIVE | Noted: 2022-07-11

## 2023-05-02 PROBLEM — E11.9 TYPE 2 DIABETES MELLITUS WITHOUT COMPLICATION, WITH LONG-TERM CURRENT USE OF INSULIN (HCC): Status: RESOLVED | Noted: 2018-02-18 | Resolved: 2023-05-02

## 2023-05-02 PROBLEM — L03.115 CELLULITIS OF RIGHT LOWER EXTREMITY: Status: RESOLVED | Noted: 2023-03-27 | Resolved: 2023-05-02

## 2023-05-02 PROBLEM — N32.0 BLADDER OUTLET OBSTRUCTION: Status: RESOLVED | Noted: 2017-03-05 | Resolved: 2023-05-02

## 2023-05-09 ENCOUNTER — OFFICE VISIT (OUTPATIENT)
Dept: FAMILY MEDICINE CLINIC | Age: 68
End: 2023-05-09
Payer: MEDICARE

## 2023-05-09 VITALS
WEIGHT: 204 LBS | HEART RATE: 104 BPM | OXYGEN SATURATION: 90 % | BODY MASS INDEX: 31.95 KG/M2 | DIASTOLIC BLOOD PRESSURE: 60 MMHG | SYSTOLIC BLOOD PRESSURE: 134 MMHG

## 2023-05-09 DIAGNOSIS — I10 BENIGN ESSENTIAL HTN: ICD-10-CM

## 2023-05-09 DIAGNOSIS — I73.9 PAD (PERIPHERAL ARTERY DISEASE) (HCC): ICD-10-CM

## 2023-05-09 DIAGNOSIS — L03.116 BILATERAL LOWER LEG CELLULITIS: Primary | ICD-10-CM

## 2023-05-09 DIAGNOSIS — L03.115 BILATERAL LOWER LEG CELLULITIS: Primary | ICD-10-CM

## 2023-05-09 DIAGNOSIS — Z72.0 TOBACCO ABUSE: ICD-10-CM

## 2023-05-09 DIAGNOSIS — J41.8 MIXED SIMPLE AND MUCOPURULENT CHRONIC BRONCHITIS (HCC): ICD-10-CM

## 2023-05-09 PROCEDURE — 3017F COLORECTAL CA SCREEN DOC REV: CPT | Performed by: FAMILY MEDICINE

## 2023-05-09 PROCEDURE — 3023F SPIROM DOC REV: CPT | Performed by: FAMILY MEDICINE

## 2023-05-09 PROCEDURE — 3075F SYST BP GE 130 - 139MM HG: CPT | Performed by: FAMILY MEDICINE

## 2023-05-09 PROCEDURE — 99214 OFFICE O/P EST MOD 30 MIN: CPT | Performed by: FAMILY MEDICINE

## 2023-05-09 PROCEDURE — 4004F PT TOBACCO SCREEN RCVD TLK: CPT | Performed by: FAMILY MEDICINE

## 2023-05-09 PROCEDURE — 1124F ACP DISCUSS-NO DSCNMKR DOCD: CPT | Performed by: FAMILY MEDICINE

## 2023-05-09 PROCEDURE — G8417 CALC BMI ABV UP PARAM F/U: HCPCS | Performed by: FAMILY MEDICINE

## 2023-05-09 PROCEDURE — 3078F DIAST BP <80 MM HG: CPT | Performed by: FAMILY MEDICINE

## 2023-05-09 PROCEDURE — G8427 DOCREV CUR MEDS BY ELIG CLIN: HCPCS | Performed by: FAMILY MEDICINE

## 2023-05-09 RX ORDER — OXYCODONE HYDROCHLORIDE 10 MG/1
10 TABLET ORAL 4 TIMES DAILY
COMMUNITY
Start: 2023-04-13

## 2023-05-09 NOTE — PROGRESS NOTES
Chief Complaint   Patient presents with    Cellulitis        Internal Administration   First Dose      Second Dose           Last COVID Lab SARS-CoV-2, PCR (no units)   Date Value   10/26/2021 Not Detected     SARS-CoV-2 RNA, RT PCR (no units)   Date Value   2023 NOT DETECTED     SARS-CoV-2, ZAHIDA (no units)   Date Value   2020 NOT DETECTED     SARS-CoV-2, NAAT (no units)   Date Value   2023 Not Detected             Wt Readings from Last 3 Encounters:   23 204 lb (92.5 kg)   23 204 lb (92.5 kg)   23 205 lb 3.2 oz (93.1 kg)     BP Readings from Last 3 Encounters:   23 134/60   23 137/74   23 135/71      Lab Results   Component Value Date    LABA1C 6.0 2023    LABA1C 5.5 2023    LABA1C 5.4 01/10/2023       HPI:  Aidan Iglesias is a 78 y.o. (: 1955) here today for    Patient cellulitis has completing healed for his right leg and his injury on his right toe has healed almost completely. He is no longer seeing wound care. He states that pain management also increased his pain medication to 10mg 4x a day and this has really helped with his pain. [] Patient has completed an advance directive  [x] Patient has NOT completed an advanced directive  [] Patient has a documented healthcare surrogate  [x] Patient does NOT have a documented healthcare surrogate  [] Discussed the importance of establishing and updating an advanced directive. Patient has questions at this time and those were answered. [x] Discussed the importance of establishing and updating an advanced directive. Patient does NOT have questions at this time.     Discussed with: [x] Patient            [] Family             [] Other caregiver    Patient's medications, allergies, past medical, surgical, social and family histories were reviewed and updated asappropriate on 2023 at 2:36 PM.    ROS:  Review of Systems    All other systems reviewed and are negative except as noted above

## 2023-05-15 RX ORDER — PANTOPRAZOLE SODIUM 40 MG/1
TABLET, DELAYED RELEASE ORAL
Qty: 30 TABLET | Refills: 3 | Status: SHIPPED | OUTPATIENT
Start: 2023-05-15

## 2023-05-22 RX ORDER — ALLOPURINOL 300 MG/1
TABLET ORAL
Qty: 30 TABLET | Refills: 2 | Status: SHIPPED | OUTPATIENT
Start: 2023-05-22

## 2023-06-01 DIAGNOSIS — J41.8 MIXED SIMPLE AND MUCOPURULENT CHRONIC BRONCHITIS (HCC): ICD-10-CM

## 2023-06-01 DIAGNOSIS — I10 BENIGN ESSENTIAL HTN: ICD-10-CM

## 2023-06-01 DIAGNOSIS — I10 UNCONTROLLED HYPERTENSION: ICD-10-CM

## 2023-06-01 RX ORDER — METOPROLOL SUCCINATE 50 MG/1
TABLET, EXTENDED RELEASE ORAL
Qty: 30 TABLET | Refills: 0 | Status: SHIPPED | OUTPATIENT
Start: 2023-06-01

## 2023-06-01 RX ORDER — ALBUTEROL SULFATE 90 UG/1
AEROSOL, METERED RESPIRATORY (INHALATION)
Qty: 18 G | Refills: 0 | Status: SHIPPED | OUTPATIENT
Start: 2023-06-01

## 2023-06-01 RX ORDER — ATORVASTATIN CALCIUM 40 MG/1
TABLET, FILM COATED ORAL
Qty: 30 TABLET | Refills: 0 | Status: SHIPPED | OUTPATIENT
Start: 2023-06-01

## 2023-06-20 ENCOUNTER — TELEPHONE (OUTPATIENT)
Dept: PULMONOLOGY | Age: 68
End: 2023-06-20

## 2023-06-22 RX ORDER — FUROSEMIDE 20 MG/1
TABLET ORAL
Qty: 30 TABLET | Refills: 0 | Status: SHIPPED | OUTPATIENT
Start: 2023-06-22

## 2023-06-22 RX ORDER — ONDANSETRON 4 MG/1
TABLET, FILM COATED ORAL
Qty: 30 TABLET | Refills: 0 | Status: SHIPPED | OUTPATIENT
Start: 2023-06-22

## 2023-06-22 RX ORDER — DILTIAZEM HYDROCHLORIDE 180 MG/1
CAPSULE, COATED, EXTENDED RELEASE ORAL
Qty: 60 CAPSULE | Refills: 0 | Status: SHIPPED | OUTPATIENT
Start: 2023-06-22

## 2023-06-22 RX ORDER — FERROUS SULFATE 325(65) MG
TABLET ORAL
Qty: 30 TABLET | Refills: 0 | Status: SHIPPED | OUTPATIENT
Start: 2023-06-22

## 2023-06-23 RX ORDER — ATORVASTATIN CALCIUM 40 MG/1
TABLET, FILM COATED ORAL
Qty: 30 TABLET | Refills: 0 | Status: SHIPPED | OUTPATIENT
Start: 2023-06-23

## 2023-06-26 DIAGNOSIS — J41.8 MIXED SIMPLE AND MUCOPURULENT CHRONIC BRONCHITIS (HCC): ICD-10-CM

## 2023-06-26 RX ORDER — ALBUTEROL SULFATE 90 UG/1
AEROSOL, METERED RESPIRATORY (INHALATION)
Qty: 18 G | Refills: 0 | Status: SHIPPED | OUTPATIENT
Start: 2023-06-26

## 2023-07-03 DIAGNOSIS — I10 UNCONTROLLED HYPERTENSION: ICD-10-CM

## 2023-07-03 DIAGNOSIS — I10 BENIGN ESSENTIAL HTN: ICD-10-CM

## 2023-07-03 RX ORDER — METOPROLOL SUCCINATE 50 MG/1
TABLET, EXTENDED RELEASE ORAL
Qty: 30 TABLET | Refills: 2 | Status: SHIPPED | OUTPATIENT
Start: 2023-07-03

## 2023-07-03 NOTE — TELEPHONE ENCOUNTER
Jaiden Lew is requesting refill(s)   Last OV 5/9/23 (pertaining to medication)  LR 6/1/23 (per medication requested)  Next office visit scheduled or attempted Yes   If no, reason:  7/12/23

## 2023-07-12 ENCOUNTER — OFFICE VISIT (OUTPATIENT)
Dept: FAMILY MEDICINE CLINIC | Age: 68
End: 2023-07-12
Payer: MEDICARE

## 2023-07-12 VITALS
SYSTOLIC BLOOD PRESSURE: 112 MMHG | WEIGHT: 204 LBS | OXYGEN SATURATION: 95 % | HEART RATE: 95 BPM | BODY MASS INDEX: 31.95 KG/M2 | DIASTOLIC BLOOD PRESSURE: 54 MMHG

## 2023-07-12 DIAGNOSIS — S50.811A ABRASION OF RIGHT FOREARM, INITIAL ENCOUNTER: ICD-10-CM

## 2023-07-12 DIAGNOSIS — K75.81 LIVER CIRRHOSIS SECONDARY TO NASH (HCC): ICD-10-CM

## 2023-07-12 DIAGNOSIS — K70.30 ALCOHOLIC CIRRHOSIS OF LIVER WITHOUT ASCITES (HCC): ICD-10-CM

## 2023-07-12 DIAGNOSIS — E11.42 DIABETIC POLYNEUROPATHY ASSOCIATED WITH TYPE 2 DIABETES MELLITUS (HCC): ICD-10-CM

## 2023-07-12 DIAGNOSIS — R73.9 HYPERGLYCEMIA: ICD-10-CM

## 2023-07-12 DIAGNOSIS — I95.1 ORTHOSTATIC HYPOTENSION: ICD-10-CM

## 2023-07-12 DIAGNOSIS — I10 BENIGN ESSENTIAL HTN: ICD-10-CM

## 2023-07-12 DIAGNOSIS — K21.9 GASTROESOPHAGEAL REFLUX DISEASE WITHOUT ESOPHAGITIS: ICD-10-CM

## 2023-07-12 DIAGNOSIS — I10 PRIMARY HYPERTENSION: ICD-10-CM

## 2023-07-12 DIAGNOSIS — E78.00 HYPERCHOLESTEROLEMIA: ICD-10-CM

## 2023-07-12 DIAGNOSIS — K74.60 LIVER CIRRHOSIS SECONDARY TO NASH (HCC): ICD-10-CM

## 2023-07-12 DIAGNOSIS — J41.8 MIXED SIMPLE AND MUCOPURULENT CHRONIC BRONCHITIS (HCC): ICD-10-CM

## 2023-07-12 PROCEDURE — 1124F ACP DISCUSS-NO DSCNMKR DOCD: CPT | Performed by: FAMILY MEDICINE

## 2023-07-12 PROCEDURE — G8417 CALC BMI ABV UP PARAM F/U: HCPCS | Performed by: FAMILY MEDICINE

## 2023-07-12 PROCEDURE — 36415 COLL VENOUS BLD VENIPUNCTURE: CPT | Performed by: FAMILY MEDICINE

## 2023-07-12 PROCEDURE — 3023F SPIROM DOC REV: CPT | Performed by: FAMILY MEDICINE

## 2023-07-12 PROCEDURE — 3017F COLORECTAL CA SCREEN DOC REV: CPT | Performed by: FAMILY MEDICINE

## 2023-07-12 PROCEDURE — 99214 OFFICE O/P EST MOD 30 MIN: CPT | Performed by: FAMILY MEDICINE

## 2023-07-12 PROCEDURE — 4004F PT TOBACCO SCREEN RCVD TLK: CPT | Performed by: FAMILY MEDICINE

## 2023-07-12 PROCEDURE — 2022F DILAT RTA XM EVC RTNOPTHY: CPT | Performed by: FAMILY MEDICINE

## 2023-07-12 PROCEDURE — G8427 DOCREV CUR MEDS BY ELIG CLIN: HCPCS | Performed by: FAMILY MEDICINE

## 2023-07-12 PROCEDURE — 3078F DIAST BP <80 MM HG: CPT | Performed by: FAMILY MEDICINE

## 2023-07-12 PROCEDURE — 3074F SYST BP LT 130 MM HG: CPT | Performed by: FAMILY MEDICINE

## 2023-07-12 PROCEDURE — 3044F HG A1C LEVEL LT 7.0%: CPT | Performed by: FAMILY MEDICINE

## 2023-07-12 RX ORDER — DILTIAZEM HYDROCHLORIDE 180 MG/1
180 CAPSULE, COATED, EXTENDED RELEASE ORAL DAILY
Qty: 90 CAPSULE | Refills: 0 | Status: SHIPPED | OUTPATIENT
Start: 2023-07-12

## 2023-07-12 NOTE — PROGRESS NOTES
Chief Complaint   Patient presents with    Hypertension    Diabetes        Internal Administration   First Dose      Second Dose           Last COVID Lab SARS-CoV-2, PCR (no units)   Date Value   10/26/2021 Not Detected     SARS-CoV-2 RNA, RT PCR (no units)   Date Value   2023 NOT DETECTED     SARS-CoV-2, ZAHIDA (no units)   Date Value   2020 NOT DETECTED     SARS-CoV-2, NAAT (no units)   Date Value   2023 Not Detected             Wt Readings from Last 3 Encounters:   23 204 lb (92.5 kg)   23 204 lb (92.5 kg)   23 204 lb (92.5 kg)     BP Readings from Last 3 Encounters:   23 (!) 146/71   23 134/60   23 137/74      Lab Results   Component Value Date    LABA1C 6.0 2023    LABA1C 5.5 2023    LABA1C 5.4 01/10/2023       HPI:  August Gonzalez is a 78 y.o. (: 1955) here today for    He states that he has been having a lot of issues with dizziness and lightheadedness mainly when he goes from sitting to standing. He fall last week due to this when getting out of bed. His left arm elbow has a good skin tear and bruising. Discussed washing the area at least once a day and using the neosporin and also covering the wound with a non stick bandage. Orthostatic blood pressures were taken today in the office. He does have orthostatic hypotension. Will cut back his diltiazem to one tablet daily. Will do repeat orthostatic blood pressures in a month. He states he has been having burning and numbness and tingling in his feet and toes. Discussed neuropathy.     [] Patient has completed an advance directive  [x] Patient has NOT completed an advanced directive  [] Patient has a documented healthcare surrogate  [x] Patient does NOT have a documented healthcare surrogate  [] Discussed the importance of establishing and updating an advanced directive. Patient has questions at this time and those were answered.   [x] Discussed the importance of establishing and

## 2023-07-12 NOTE — PATIENT INSTRUCTIONS

## 2023-07-13 LAB
ANION GAP SERPL CALCULATED.3IONS-SCNC: 15 MMOL/L (ref 3–16)
BASOPHILS # BLD: 0.1 K/UL (ref 0–0.2)
BASOPHILS NFR BLD: 0.8 %
BUN SERPL-MCNC: 9 MG/DL (ref 7–20)
CALCIUM SERPL-MCNC: 10 MG/DL (ref 8.3–10.6)
CHLORIDE SERPL-SCNC: 95 MMOL/L (ref 99–110)
CHOLEST SERPL-MCNC: 188 MG/DL (ref 0–199)
CO2 SERPL-SCNC: 27 MMOL/L (ref 21–32)
CREAT SERPL-MCNC: 0.8 MG/DL (ref 0.8–1.3)
DEPRECATED RDW RBC AUTO: 15.4 % (ref 12.4–15.4)
EOSINOPHIL # BLD: 0.2 K/UL (ref 0–0.6)
EOSINOPHIL NFR BLD: 2.2 %
EST. AVERAGE GLUCOSE BLD GHB EST-MCNC: 125.5 MG/DL
GFR SERPLBLD CREATININE-BSD FMLA CKD-EPI: >60 ML/MIN/{1.73_M2}
GLUCOSE SERPL-MCNC: 111 MG/DL (ref 70–99)
HBA1C MFR BLD: 6 %
HCT VFR BLD AUTO: 47.2 % (ref 40.5–52.5)
HDLC SERPL-MCNC: 47 MG/DL (ref 40–60)
HGB BLD-MCNC: 16 G/DL (ref 13.5–17.5)
LDLC SERPL CALC-MCNC: 109 MG/DL
LYMPHOCYTES # BLD: 2.5 K/UL (ref 1–5.1)
LYMPHOCYTES NFR BLD: 24.8 %
MCH RBC QN AUTO: 31.7 PG (ref 26–34)
MCHC RBC AUTO-ENTMCNC: 33.9 G/DL (ref 31–36)
MCV RBC AUTO: 93.7 FL (ref 80–100)
MONOCYTES # BLD: 0.9 K/UL (ref 0–1.3)
MONOCYTES NFR BLD: 8.8 %
NEUTROPHILS # BLD: 6.3 K/UL (ref 1.7–7.7)
NEUTROPHILS NFR BLD: 63.4 %
PLATELET # BLD AUTO: 219 K/UL (ref 135–450)
PLATELET BLD QL SMEAR: ADEQUATE
PMV BLD AUTO: 7.7 FL (ref 5–10.5)
POTASSIUM SERPL-SCNC: 3.9 MMOL/L (ref 3.5–5.1)
RBC # BLD AUTO: 5.04 M/UL (ref 4.2–5.9)
SLIDE REVIEW: NORMAL
SODIUM SERPL-SCNC: 137 MMOL/L (ref 136–145)
TRIGL SERPL-MCNC: 161 MG/DL (ref 0–150)
VLDLC SERPL CALC-MCNC: 32 MG/DL
WBC # BLD AUTO: 9.9 K/UL (ref 4–11)

## 2023-07-17 RX ORDER — FLUTICASONE FUROATE, UMECLIDINIUM BROMIDE AND VILANTEROL TRIFENATATE 100; 62.5; 25 UG/1; UG/1; UG/1
POWDER RESPIRATORY (INHALATION)
Qty: 1 EACH | Refills: 5 | Status: SHIPPED | OUTPATIENT
Start: 2023-07-17

## 2023-07-17 NOTE — TELEPHONE ENCOUNTER
Refill Request     CONFIRM preferrred pharmacy with the patient. If Mail Order Rx - Pend for 90 day refill. Last Seen: Last Seen Department: 7/12/2023  Last Seen by PCP: 7/12/2023    Last Written: 2/27/23    If no future appointment scheduled, route STAFF MESSAGE with patient name to the [facility] for scheduling. Next Appointment:   Future Appointments   Date Time Provider [address] [place] [address]   8/15/2023  1:40 PM MD Edwin Martínez - [place]   9/5/2023  2:00 PM [facility]   1/15/2024 11:20 AM MD Edwin Martínez [place] - [place]       Message sent to [address] [place] [address] to schedule appt with patient? YES      Requested Prescriptions     Pending Prescriptions Disp Refills    TRELEGY ELLIPTA 100-62.5-25 MCG/ACT AEPB inhaler [Pharmacy Med Name: Tylor Singh 100MCG AERO POW BR ACT]  5     Sig: INHALE ONE (1) PUFF INTO THE LUNGS IN THE MORNING.

## 2023-07-24 DIAGNOSIS — E55.9 VITAMIN D DEFICIENCY: ICD-10-CM

## 2023-07-24 RX ORDER — FERROUS SULFATE 325(65) MG
TABLET ORAL
Qty: 30 TABLET | Refills: 2 | Status: SHIPPED | OUTPATIENT
Start: 2023-07-24

## 2023-07-24 RX ORDER — ALLOPURINOL 300 MG/1
TABLET ORAL
Qty: 30 TABLET | Refills: 2 | Status: SHIPPED | OUTPATIENT
Start: 2023-07-24

## 2023-07-24 NOTE — TELEPHONE ENCOUNTER
Refill Request     CONFIRM preferrred pharmacy with the patient. If Mail Order Rx - Pend for 90 day refill. Last Seen: Last Seen Department: 7/12/2023  Last Seen by PCP: 7/12/2023    Last Written: 5/22/23, 7/12/22    If no future appointment scheduled, route STAFF MESSAGE with patient name to the [facility] for scheduling. Next Appointment:   Future Appointments   Date Time Provider [address] [place] [address]   8/15/2023  1:40 PM MD Edwin Denney  [place] - [place]   9/5/2023  2:00 PM Sharri Lundberg MD [facility]   1/15/2024 11:20 AM Yandy Le MD [facility] [place] - [place]       Message sent to [address] [place] [address] to schedule appt with patient?   YES      Requested Prescriptions     Pending Prescriptions Disp Refills    allopurinol (ZYLOPRIM) 300 MG tablet [Pharmacy Med Name: ALLOPURINOL 300MG TABLET] 30 tablet 2     Sig: TAKE ONE (1) TABLET BY MOUTH DAILY    Cholecalciferol (VITAMIN D3) 125 MCG (5000 UT) CAPS [Pharmacy Med Name: VITAMIN D3 5000UNIT CAPSULE] 90 capsule 3     Sig: TAKE ONE (1) CAPSULE BY MOUTH DAILY

## 2023-07-24 NOTE — TELEPHONE ENCOUNTER
Refill Request     CONFIRM preferrred pharmacy with the patient. If Mail Order Rx - Pend for 90 day refill. Last Seen: Last Seen Department: 7/12/2023  Last Seen by PCP: Visit date not found    Last Written: 6/22/23    If no future appointment scheduled, route STAFF MESSAGE with patient name to the [facility] for scheduling. Next Appointment:   Future Appointments   Date Time Provider [address]   8/15/2023  1:40 PM MD Edwin Baldwin [place] - [place]   9/5/2023  2:00 PM Jazmine De Guzman MD [facility]   1/15/2024 11:20 AM MD Edwin Baldwin  [place] - [place]       Message sent to [address] [place] [address] to schedule appt with patient?   YES      Requested Prescriptions     Pending Prescriptions Disp Refills    FEROSUL 325 (65 Fe) MG tablet [Pharmacy Med Name: FEROSUL 325MG TABLET] 30 tablet 0     Sig: TAKE ONE (1) TABLET BY MOUTH DAILY WITH BREAKFAST

## 2023-07-31 DIAGNOSIS — J41.8 MIXED SIMPLE AND MUCOPURULENT CHRONIC BRONCHITIS (HCC): ICD-10-CM

## 2023-07-31 RX ORDER — FUROSEMIDE 20 MG/1
TABLET ORAL
Qty: 30 TABLET | Refills: 5 | Status: SHIPPED | OUTPATIENT
Start: 2023-07-31

## 2023-07-31 RX ORDER — FOLIC ACID 1 MG/1
TABLET ORAL
Qty: 30 TABLET | Refills: 5 | Status: SHIPPED | OUTPATIENT
Start: 2023-07-31

## 2023-07-31 NOTE — TELEPHONE ENCOUNTER
Refill Request     CONFIRM preferrred pharmacy with the patient. If Mail Order Rx - Pend for 90 day refill. Last Seen: Last Seen Department: 7/12/2023  Last Seen by PCP: 7/12/2023    Last Written: 3-    If no future appointment scheduled, route STAFF MESSAGE with patient name to the [facility] for scheduling. Next Appointment:   Future Appointments   Date Time Provider [address] [place] [address]   8/15/2023  1:40 PM MD Edwin Solorzano [place] - [place]   9/5/2023  2:00 PM Yong Weber MD [facility]   1/15/2024 11:20 AM MD Edwin Solorzano  [place] - [place]       Message sent to [address] [place] [address] to schedule appt with patient?   N/A      Requested Prescriptions     Pending Prescriptions Disp Refills    folic acid (FOLVITE) 1 MG tablet [Pharmacy Med Name: FOLIC ACID [id number] TABLET] 30 tablet 4     Sig: TAKE ONE (1) TABLET BY MOUTH DAILY

## 2023-07-31 NOTE — TELEPHONE ENCOUNTER
Refill Request     CONFIRM preferrred pharmacy with the patient. If Mail Order Rx - Pend for 90 day refill. Last Seen: Last Seen Department: 7/12/2023  Last Seen by PCP: Visit date not found    Last Written: 6-    If no future appointment scheduled, route STAFF MESSAGE with patient name to the [facility] for scheduling. Next Appointment:   Future Appointments   Date Time Provider [address] [place] [address]   8/15/2023  1:40 PM MD Edwin Hackett  [place] - [place]   9/5/2023  2:00 PM Nya Soliman MD [facility]   1/15/2024 11:20 AM MD Edwin Hackett  [place] - [place]       Message sent to [address] [place] [address] to schedule appt with patient?   N/A      Requested Prescriptions     Pending Prescriptions Disp Refills    furosemide (LASIX) 20 MG tablet [Pharmacy Med Name: FUROSEMIDE 20MG TABLET] 30 tablet 0     Sig: TAKE ONE (1) TABLET BY MOUTH DAILY

## 2023-08-01 RX ORDER — ALBUTEROL SULFATE 90 UG/1
AEROSOL, METERED RESPIRATORY (INHALATION)
Qty: 18 EACH | Refills: 5 | Status: SHIPPED | OUTPATIENT
Start: 2023-08-01

## 2023-08-01 NOTE — TELEPHONE ENCOUNTER
Refill Request     CONFIRM preferrred pharmacy with the patient. If Mail Order Rx - Pend for 90 day refill. Last Seen: Last Seen Department: 7/12/2023  Last Seen by PCP: 7/12/2023    Last Written: 6-    If no future appointment scheduled, route STAFF MESSAGE with patient name to the [facility] for scheduling. Next Appointment:   Future Appointments   Date Time Provider [facility] [address]   8/15/2023  1:40 PM MD Edwin Mendes  [place] - [place]   9/5/2023  2:00 PM Ledell Kawasaki, [facility]   1/15/2024 11:20 AM MD Edwin Mendes  [place] - [place]       Message sent to [address] [place] [address] to schedule appt with patient?   N/A      Requested Prescriptions     Pending Prescriptions Disp Refills    albuterol sulfate HFA (VENTOLIN HFA) 108 (90 Base) MCG/ACT inhaler [Pharmacy Med Name: VENTOLIN HFA  AEROSOL SOLN] 18 each 0     Sig: INHALE TWO (2) PUFFS EVERY SIX (6) HOURS AS NEEDED FOR WHEEZING

## 2023-08-07 RX ORDER — ATORVASTATIN CALCIUM 40 MG/1
TABLET, FILM COATED ORAL
Qty: 30 TABLET | Refills: 2 | Status: SHIPPED | OUTPATIENT
Start: 2023-08-07

## 2023-08-07 RX ORDER — PANTOPRAZOLE SODIUM 40 MG/1
TABLET, DELAYED RELEASE ORAL
Qty: 30 TABLET | Refills: 2 | Status: SHIPPED | OUTPATIENT
Start: 2023-08-07

## 2023-08-07 NOTE — TELEPHONE ENCOUNTER
Refill Request     CONFIRM preferrred pharmacy with the patient. If Mail Order Rx - Pend for 90 day refill. Last Seen: Last Seen Department: 7/12/2023  Last Seen by PCP: 7/12/2023    Last Written: 5/15/23    If no future appointment scheduled, route STAFF MESSAGE with patient name to the [facility] for scheduling. Next Appointment:   Future Appointments   Date Time Provider [address] [place] [address]   8/15/2023  1:40 PM MD Edwin Anna - [place]   9/5/2023  2:00 PM Eris Munson MD [facility]   1/15/2024 11:20 AM MD Edwin Anna  [place] - [place]       Message sent to [address] [place] [address] to schedule appt with patient?   YES      Requested Prescriptions     Pending Prescriptions Disp Refills    pantoprazole (PROTONIX) 40 MG tablet [Pharmacy Med Name: PANTOPRAZOLE SODIUM 40MG TABLET DR] 30 tablet 2     Sig: TAKE ONE TABLET BY MOUTH EVERY MORNING BEFORE BREAKFAST

## 2023-08-07 NOTE — TELEPHONE ENCOUNTER
Refill Request     CONFIRM preferrred pharmacy with the patient. If Mail Order Rx - Pend for 90 day refill. Last Seen: Last Seen Department: 7/12/2023  Last Seen by PCP: Visit date not found    Last Written: 6/23/23    If no future appointment scheduled, route STAFF MESSAGE with patient name to the [facility] for scheduling. Next Appointment:   Future Appointments   Date Time Provider [address]   8/15/2023  1:40 PM MD Edwin Mclean [place] - [place]   9/5/2023  2:00 PM Ova Canavan, MD [facility]   1/15/2024 11:20 AM MD Edwin Mclean [place] - [place]       Message sent to [employer] to schedule appt with patient?   YES      Requested Prescriptions     Pending Prescriptions Disp Refills    atorvastatin (LIPITOR) 40 MG tablet [Pharmacy Med Name: ATORVASTATIN CALCIUM 40MG TABLET] 30 tablet 2     Sig: TAKE ONE (1) TABLET BY MOUTH DAILY

## 2023-08-15 ENCOUNTER — OFFICE VISIT (OUTPATIENT)
Dept: FAMILY MEDICINE CLINIC | Age: 68
End: 2023-08-15
Payer: MEDICARE

## 2023-08-15 VITALS
HEART RATE: 83 BPM | OXYGEN SATURATION: 89 % | SYSTOLIC BLOOD PRESSURE: 116 MMHG | DIASTOLIC BLOOD PRESSURE: 72 MMHG | WEIGHT: 203 LBS | BODY MASS INDEX: 31.79 KG/M2

## 2023-08-15 DIAGNOSIS — J96.11 CHRONIC RESPIRATORY FAILURE WITH HYPOXIA (HCC): ICD-10-CM

## 2023-08-15 DIAGNOSIS — I95.1 ORTHOSTATIC HYPOTENSION: Primary | ICD-10-CM

## 2023-08-15 DIAGNOSIS — Z72.0 TOBACCO ABUSE: ICD-10-CM

## 2023-08-15 DIAGNOSIS — I10 UNCONTROLLED HYPERTENSION: ICD-10-CM

## 2023-08-15 DIAGNOSIS — I10 BENIGN ESSENTIAL HTN: ICD-10-CM

## 2023-08-15 DIAGNOSIS — J41.8 MIXED SIMPLE AND MUCOPURULENT CHRONIC BRONCHITIS (HCC): ICD-10-CM

## 2023-08-15 DIAGNOSIS — R09.02 HYPOXEMIA: ICD-10-CM

## 2023-08-15 DIAGNOSIS — R09.02 OXYGEN DESATURATION: ICD-10-CM

## 2023-08-15 DIAGNOSIS — K55.20 AVM (ARTERIOVENOUS MALFORMATION) OF SMALL BOWEL, ACQUIRED: ICD-10-CM

## 2023-08-15 PROCEDURE — G8417 CALC BMI ABV UP PARAM F/U: HCPCS | Performed by: FAMILY MEDICINE

## 2023-08-15 PROCEDURE — G8427 DOCREV CUR MEDS BY ELIG CLIN: HCPCS | Performed by: FAMILY MEDICINE

## 2023-08-15 PROCEDURE — 3023F SPIROM DOC REV: CPT | Performed by: FAMILY MEDICINE

## 2023-08-15 PROCEDURE — 3078F DIAST BP <80 MM HG: CPT | Performed by: FAMILY MEDICINE

## 2023-08-15 PROCEDURE — 4004F PT TOBACCO SCREEN RCVD TLK: CPT | Performed by: FAMILY MEDICINE

## 2023-08-15 PROCEDURE — 1124F ACP DISCUSS-NO DSCNMKR DOCD: CPT | Performed by: FAMILY MEDICINE

## 2023-08-15 PROCEDURE — 3074F SYST BP LT 130 MM HG: CPT | Performed by: FAMILY MEDICINE

## 2023-08-15 PROCEDURE — 3017F COLORECTAL CA SCREEN DOC REV: CPT | Performed by: FAMILY MEDICINE

## 2023-08-15 PROCEDURE — 99214 OFFICE O/P EST MOD 30 MIN: CPT | Performed by: FAMILY MEDICINE

## 2023-08-15 RX ORDER — METOPROLOL SUCCINATE 50 MG/1
25 TABLET, EXTENDED RELEASE ORAL DAILY
Qty: 30 TABLET | Refills: 2
Start: 2023-08-15

## 2023-08-15 RX ORDER — INSULIN GLARGINE 100 [IU]/ML
INJECTION, SOLUTION SUBCUTANEOUS
Qty: 15 ML | Refills: 0 | Status: SHIPPED | OUTPATIENT
Start: 2023-08-15

## 2023-08-15 NOTE — PROGRESS NOTES
and in the presence of Rosa Morgan MD. Electronically signed by Brit Haynes RN on 8/15/2023 at 1:56 PM      Provider attestation:     I, Dr. Garima Martinez, personally performed the services described in this documentation, as scribed by the above signed scribe in my presence, and it is both accurate and complete. I agree with the ROS and Past Histories independently gathered by the clinical support staff and the remaining scribed note accurately describes my personal service to the patient.       8/15/2023    2:24 PM

## 2023-08-15 NOTE — TELEPHONE ENCOUNTER
Refill Request     CONFIRM preferrred pharmacy with the patient. If Mail Order Rx - Pend for 90 day refill. Last Seen: Last Seen Department: 7/12/2023  Last Seen by PCP: 7/12/2023    Last Written: n/a    If no future appointment scheduled, route STAFF MESSAGE with patient name to the [facility] for scheduling. Next Appointment:   Future Appointments   Date Time Provider [address]   8/15/2023  1:40 PM MD Edwin Dominguez  [place] - [place]   9/5/2023  2:00 PM Dev Mcmahon MD [facility]   1/15/2024 11:20 AM MD Edwin Dominguez  [place] - [place]       Message sent to [address] [place] [address] to schedule appt with patient?   N/A      Requested Prescriptions     Pending Prescriptions Disp Refills    LANTUS SOLOSTAR 100 UNIT/ML injection pen [Pharmacy Med Name: LANTUS SOLOSTAR 100/ML SOLN PEN-INJ] 15 mL 0     Sig: INJECT 25 UNITS INTO THE SKIN DAILY WITH SUPPER

## 2023-08-22 ENCOUNTER — TELEPHONE (OUTPATIENT)
Dept: FAMILY MEDICINE CLINIC | Age: 68
End: 2023-08-22

## 2023-08-22 NOTE — TELEPHONE ENCOUNTER
[facility] called and said that they are not contracted with pt's insurance. They states that the one closest to him that is would be [facility].

## 2023-09-04 ENCOUNTER — APPOINTMENT (OUTPATIENT)
Dept: CT IMAGING | Age: 68
DRG: 177 | End: 2023-09-04
Payer: MEDICARE

## 2023-09-04 ENCOUNTER — HOSPITAL ENCOUNTER (EMERGENCY)
Age: 68
Discharge: HOME | DRG: 177 | End: 2023-09-04
Attending: STUDENT IN AN ORGANIZED HEALTH CARE EDUCATION/TRAINING PROGRAM
Payer: MEDICARE

## 2023-09-04 VITALS
SYSTOLIC BLOOD PRESSURE: 114 MMHG | HEIGHT: 67 IN | OXYGEN SATURATION: 92 % | HEART RATE: 74 BPM | BODY MASS INDEX: 32.18 KG/M2 | TEMPERATURE: 98.6 F | WEIGHT: 205 LBS | RESPIRATION RATE: 21 BRPM | DIASTOLIC BLOOD PRESSURE: 64 MMHG

## 2023-09-04 DIAGNOSIS — J44.1 COPD EXACERBATION (HCC): ICD-10-CM

## 2023-09-04 DIAGNOSIS — U07.1 COVID-19: Primary | ICD-10-CM

## 2023-09-04 LAB
ALBUMIN SERPL-MCNC: 3.7 G/DL (ref 3.4–5)
ALBUMIN/GLOB SERPL: 0.8 {RATIO} (ref 1.1–2.2)
ALP SERPL-CCNC: 183 U/L (ref 40–129)
ALT SERPL-CCNC: 40 U/L (ref 10–40)
ANION GAP SERPL CALCULATED.3IONS-SCNC: 9 MMOL/L (ref 3–16)
AST SERPL-CCNC: 64 U/L (ref 15–37)
BACTERIA URNS QL MICRO: ABNORMAL /HPF
BASE EXCESS BLDV CALC-SCNC: 6.5 MMOL/L (ref -3–3)
BASOPHILS # BLD: 0 K/UL (ref 0–0.2)
BASOPHILS NFR BLD: 0.6 %
BILIRUB SERPL-MCNC: 0.4 MG/DL (ref 0–1)
BILIRUB UR QL STRIP.AUTO: NEGATIVE
BUN SERPL-MCNC: 7 MG/DL (ref 7–20)
CALCIUM SERPL-MCNC: 10.2 MG/DL (ref 8.3–10.6)
CHLORIDE SERPL-SCNC: 93 MMOL/L (ref 99–110)
CLARITY UR: CLEAR
CO2 BLDV-SCNC: 36 MMOL/L
CO2 SERPL-SCNC: 32 MMOL/L (ref 21–32)
COHGB MFR BLDV: 1.9 % (ref 0–1.5)
COLOR UR: YELLOW
CREAT SERPL-MCNC: 0.8 MG/DL (ref 0.8–1.3)
DEPRECATED RDW RBC AUTO: 16.1 % (ref 12.4–15.4)
EOSINOPHIL # BLD: 0.1 K/UL (ref 0–0.6)
EOSINOPHIL NFR BLD: 3.1 %
EPI CELLS #/AREA URNS HPF: ABNORMAL /HPF (ref 0–5)
GFR SERPLBLD CREATININE-BSD FMLA CKD-EPI: >60 ML/MIN/{1.73_M2}
GLUCOSE SERPL-MCNC: 112 MG/DL (ref 70–99)
GLUCOSE UR STRIP.AUTO-MCNC: NEGATIVE MG/DL
HCO3 BLDV-SCNC: 33.9 MMOL/L (ref 23–29)
HCT VFR BLD AUTO: 48 % (ref 40.5–52.5)
HGB BLD-MCNC: 16.4 G/DL (ref 13.5–17.5)
HGB UR QL STRIP.AUTO: NEGATIVE
INR PPP: 0.96 (ref 0.84–1.16)
KETONES UR STRIP.AUTO-MCNC: NEGATIVE MG/DL
LEUKOCYTE ESTERASE UR QL STRIP.AUTO: NEGATIVE
LIPASE SERPL-CCNC: 32 U/L (ref 13–60)
LYMPHOCYTES # BLD: 1.2 K/UL (ref 1–5.1)
LYMPHOCYTES NFR BLD: 30.3 %
MAGNESIUM SERPL-MCNC: 1.5 MG/DL (ref 1.8–2.4)
MCH RBC QN AUTO: 30.9 PG (ref 26–34)
MCHC RBC AUTO-ENTMCNC: 34.2 G/DL (ref 31–36)
MCV RBC AUTO: 90.3 FL (ref 80–100)
METHGB MFR BLDV: 0.3 %
MONOCYTES # BLD: 0.7 K/UL (ref 0–1.3)
MONOCYTES NFR BLD: 17.5 %
MUCOUS THREADS #/AREA URNS LPF: ABNORMAL /LPF
NEUTROPHILS # BLD: 2 K/UL (ref 1.7–7.7)
NEUTROPHILS NFR BLD: 48.5 %
NITRITE UR QL STRIP.AUTO: NEGATIVE
O2 CT VFR BLDV CALC: 19 VOL %
O2 THERAPY: ABNORMAL
PCO2 BLDV: 57.6 MMHG (ref 40–50)
PH BLDV: 7.39 [PH] (ref 7.35–7.45)
PH UR STRIP.AUTO: 7 [PH] (ref 5–8)
PLATELET # BLD AUTO: 190 K/UL (ref 135–450)
PMV BLD AUTO: 8.1 FL (ref 5–10.5)
PO2 BLDV: 45.6 MMHG (ref 25–40)
POTASSIUM SERPL-SCNC: 3.5 MMOL/L (ref 3.5–5.1)
PROT SERPL-MCNC: 8.3 G/DL (ref 6.4–8.2)
PROT UR STRIP.AUTO-MCNC: ABNORMAL MG/DL
PROTHROMBIN TIME: 12.8 SEC (ref 11.5–14.8)
RBC # BLD AUTO: 5.32 M/UL (ref 4.2–5.9)
RBC #/AREA URNS HPF: ABNORMAL /HPF (ref 0–4)
SAO2 % BLDV: 80 %
SODIUM SERPL-SCNC: 134 MMOL/L (ref 136–145)
SP GR UR STRIP.AUTO: 1.01 (ref 1–1.03)
TROPONIN, HIGH SENSITIVITY: 18 NG/L (ref 0–22)
UA COMPLETE W REFLEX CULTURE PNL UR: ABNORMAL
UA DIPSTICK W REFLEX MICRO PNL UR: YES
URN SPEC COLLECT METH UR: ABNORMAL
UROBILINOGEN UR STRIP-ACNC: 1 E.U./DL
WBC # BLD AUTO: 4.1 K/UL (ref 4–11)
WBC #/AREA URNS HPF: ABNORMAL /HPF (ref 0–5)

## 2023-09-04 PROCEDURE — 74177 CT ABD & PELVIS W/CONTRAST: CPT

## 2023-09-04 PROCEDURE — 85025 COMPLETE CBC W/AUTO DIFF WBC: CPT

## 2023-09-04 PROCEDURE — 6370000000 HC RX 637 (ALT 250 FOR IP): Performed by: STUDENT IN AN ORGANIZED HEALTH CARE EDUCATION/TRAINING PROGRAM

## 2023-09-04 PROCEDURE — 84484 ASSAY OF TROPONIN QUANT: CPT

## 2023-09-04 PROCEDURE — 80053 COMPREHEN METABOLIC PANEL: CPT

## 2023-09-04 PROCEDURE — 83735 ASSAY OF MAGNESIUM: CPT

## 2023-09-04 PROCEDURE — 96365 THER/PROPH/DIAG IV INF INIT: CPT

## 2023-09-04 PROCEDURE — 82803 BLOOD GASES ANY COMBINATION: CPT

## 2023-09-04 PROCEDURE — 83690 ASSAY OF LIPASE: CPT

## 2023-09-04 PROCEDURE — 6360000002 HC RX W HCPCS: Performed by: STUDENT IN AN ORGANIZED HEALTH CARE EDUCATION/TRAINING PROGRAM

## 2023-09-04 PROCEDURE — 71260 CT THORAX DX C+: CPT

## 2023-09-04 PROCEDURE — 93005 ELECTROCARDIOGRAM TRACING: CPT | Performed by: STUDENT IN AN ORGANIZED HEALTH CARE EDUCATION/TRAINING PROGRAM

## 2023-09-04 PROCEDURE — 96366 THER/PROPH/DIAG IV INF ADDON: CPT

## 2023-09-04 PROCEDURE — 36415 COLL VENOUS BLD VENIPUNCTURE: CPT

## 2023-09-04 PROCEDURE — 85610 PROTHROMBIN TIME: CPT

## 2023-09-04 PROCEDURE — 6360000004 HC RX CONTRAST MEDICATION: Performed by: STUDENT IN AN ORGANIZED HEALTH CARE EDUCATION/TRAINING PROGRAM

## 2023-09-04 PROCEDURE — 99285 EMERGENCY DEPT VISIT HI MDM: CPT

## 2023-09-04 PROCEDURE — 96375 TX/PRO/DX INJ NEW DRUG ADDON: CPT

## 2023-09-04 PROCEDURE — 81001 URINALYSIS AUTO W/SCOPE: CPT

## 2023-09-04 RX ORDER — IPRATROPIUM BROMIDE AND ALBUTEROL SULFATE 2.5; .5 MG/3ML; MG/3ML
3 SOLUTION RESPIRATORY (INHALATION) ONCE
Status: COMPLETED | OUTPATIENT
Start: 2023-09-04 | End: 2023-09-04

## 2023-09-04 RX ORDER — PREDNISONE 50 MG/1
50 TABLET ORAL DAILY
Qty: 5 TABLET | Refills: 0 | Status: SHIPPED | OUTPATIENT
Start: 2023-09-04 | End: 2023-09-05 | Stop reason: ALTCHOICE

## 2023-09-04 RX ORDER — OXYCODONE HYDROCHLORIDE AND ACETAMINOPHEN 5; 325 MG/1; MG/1
1 TABLET ORAL
Status: COMPLETED | OUTPATIENT
Start: 2023-09-04 | End: 2023-09-04

## 2023-09-04 RX ORDER — AZITHROMYCIN 250 MG/1
250 TABLET, FILM COATED ORAL SEE ADMIN INSTRUCTIONS
Qty: 6 TABLET | Refills: 0 | Status: SHIPPED | OUTPATIENT
Start: 2023-09-04 | End: 2023-09-05 | Stop reason: ALTCHOICE

## 2023-09-04 RX ORDER — AMOXICILLIN AND CLAVULANATE POTASSIUM 875; 125 MG/1; MG/1
1 TABLET, FILM COATED ORAL 2 TIMES DAILY
Qty: 14 TABLET | Refills: 0 | Status: SHIPPED | OUTPATIENT
Start: 2023-09-04 | End: 2023-09-04 | Stop reason: SDUPTHER

## 2023-09-04 RX ORDER — AMOXICILLIN AND CLAVULANATE POTASSIUM 875; 125 MG/1; MG/1
1 TABLET, FILM COATED ORAL 2 TIMES DAILY
Qty: 14 TABLET | Refills: 0 | Status: SHIPPED | OUTPATIENT
Start: 2023-09-04 | End: 2023-09-05 | Stop reason: ALTCHOICE

## 2023-09-04 RX ORDER — MAGNESIUM SULFATE IN WATER 40 MG/ML
4000 INJECTION, SOLUTION INTRAVENOUS ONCE
Status: COMPLETED | OUTPATIENT
Start: 2023-09-04 | End: 2023-09-04

## 2023-09-04 RX ORDER — AZITHROMYCIN 250 MG/1
250 TABLET, FILM COATED ORAL SEE ADMIN INSTRUCTIONS
Qty: 6 TABLET | Refills: 0 | Status: SHIPPED | OUTPATIENT
Start: 2023-09-04 | End: 2023-09-04 | Stop reason: SDUPTHER

## 2023-09-04 RX ORDER — AZITHROMYCIN 250 MG/1
500 TABLET, FILM COATED ORAL ONCE
Status: COMPLETED | OUTPATIENT
Start: 2023-09-04 | End: 2023-09-04

## 2023-09-04 RX ORDER — ONDANSETRON 2 MG/ML
4 INJECTION INTRAMUSCULAR; INTRAVENOUS ONCE
Status: COMPLETED | OUTPATIENT
Start: 2023-09-04 | End: 2023-09-04

## 2023-09-04 RX ORDER — PREDNISONE 50 MG/1
50 TABLET ORAL DAILY
Qty: 5 TABLET | Refills: 0 | Status: SHIPPED | OUTPATIENT
Start: 2023-09-04 | End: 2023-09-04 | Stop reason: SDUPTHER

## 2023-09-04 RX ORDER — AMOXICILLIN AND CLAVULANATE POTASSIUM 875; 125 MG/1; MG/1
1 TABLET, FILM COATED ORAL ONCE
Status: COMPLETED | OUTPATIENT
Start: 2023-09-04 | End: 2023-09-04

## 2023-09-04 RX ADMIN — OXYCODONE HYDROCHLORIDE AND ACETAMINOPHEN 1 TABLET: 5; 325 TABLET ORAL at 15:27

## 2023-09-04 RX ADMIN — AMOXICILLIN AND CLAVULANATE POTASSIUM 1 TABLET: 875; 125 TABLET, FILM COATED ORAL at 19:54

## 2023-09-04 RX ADMIN — MAGNESIUM SULFATE HEPTAHYDRATE 4000 MG: 40 INJECTION, SOLUTION INTRAVENOUS at 16:54

## 2023-09-04 RX ADMIN — IOPAMIDOL 85 ML: 755 INJECTION, SOLUTION INTRAVENOUS at 16:24

## 2023-09-04 RX ADMIN — ONDANSETRON 4 MG: 2 INJECTION INTRAMUSCULAR; INTRAVENOUS at 15:23

## 2023-09-04 RX ADMIN — AZITHROMYCIN 500 MG: 250 TABLET, FILM COATED ORAL at 19:55

## 2023-09-04 RX ADMIN — IPRATROPIUM BROMIDE AND ALBUTEROL SULFATE 3 DOSE: 2.5; .5 SOLUTION RESPIRATORY (INHALATION) at 15:24

## 2023-09-04 RX ADMIN — PREDNISONE 50 MG: 20 TABLET ORAL at 19:54

## 2023-09-04 NOTE — ED NOTES
Ambulated patient, patient walked with his home oxygen of 2 liters on. Started out at 94%, patient dropped to 89%. Became really sob. Dr Sofia Coyne informed.       Farnaz Wallace, CONSTANTINO  09/04/23 2395

## 2023-09-04 NOTE — DISCHARGE INSTRUCTIONS
Return to nearest ED if you develop worsening shortness of breath, chest pain, other concerning symptoms. Use your inhalers at home as prescribed. Take the antibiotics and steroid as prescribed. Call your pulmonologist in the morning to determine when they want you to follow-up.

## 2023-09-04 NOTE — ED PROVIDER NOTES
[address] [facility] 1 ED      CHIEF COMPLAINT  Shortness of Breath (Patient comes in due to increased shortness of breath x2 days. Patient also reporting coughing up \"yellow\" mucus. Patient states he took a home test and tested positive for covid today. Per EMS patient found at 88% on his [facility]. Patient 96% on home o2 level in triage. )       HISTORY OF PRESENT ILLNESS  Velma Copeland is a 78 y.o. male  who presents to the ED complaining of cough, shortness of breath, chest pain, abdominal pain. Patient states that his symptoms all started about 1 week ago. Yesterday he took a COVID test at home and states that it was positive. States that he has been coughing up yellow mucus for the last few days. Denies any fevers at home. He does have chronic respiratory failure and requires 2 L oxygen by nasal cannula chronically, was found to be hypoxic on this by EMS and increased to 4 L in route. He endorses nausea, no vomiting. He has black formed stools that he states is from his chronic iron supplementation. Denies any leg pain or swelling. Does endorse tingling and burning of his feet bilaterally that he states has been ongoing for several months. No other complaints, modifying factors or associated symptoms. I have reviewed the following from the nursing documentation.     Past Medical History:   Diagnosis Date    Bladder outlet obstruction 03/05/2017    Carpal tunnel syndrome of left wrist 04/02/2013    Cellulitis of right lower extremity 03/27/2023    Cellulitis of right upper extremity 03/01/2023    w/ Group A Strep bacteremia    COPD exacerbation ([facility]) 03/02/2023    Cough syncope 01/10/2023    Diabetic ketoacidosis without coma associated with type 2 diabetes mellitus ([facility]) 02/18/2018    GI bleed 05/08/2022    Gout 02/01/2013    Hilar adenopathy     Iron deficiency anemia     Malignant neoplasm of urinary bladder ([facility]) 02/09/2016    Multiple duodenal ulcers     Other arterial embolism and thrombosis how to take these medications. Discharge Medication List as of 9/4/2023  7:49 PM        START taking these medications    Details   predniSONE (DELTASONE) 50 MG tablet Take 1 tablet by mouth daily for 5 days, Disp-5 tablet, R-0Normal      amoxicillin-clavulanate (AUGMENTIN) 875-125 MG per tablet Take 1 tablet by mouth 2 times daily for 7 days, Disp-14 tablet, R-0Normal      azithromycin (ZITHROMAX) 250 MG tablet Take 1 tablet by mouth See Admin Instructions for 5 days 500mg on day 1 followed by 250mg on days 2 - 5, Disp-6 tablet, R-0Normal             Follow-up with:  Tian Hagen MD  [address] [facility]  [phone]    Schedule an appointment as soon as possible for a visit in 3 days  Follow up within 3 days, Return to ED sooner if symptoms worsen      DISCLAIMER: This chart was created using Dragon dictation software. Efforts were made by me to ensure accuracy, however some errors may be present due to limitations of this technology and occasionally words are not transcribed correctly.         Benita Box DO  09/05/23 9673

## 2023-09-05 ENCOUNTER — APPOINTMENT (OUTPATIENT)
Dept: GENERAL RADIOLOGY | Age: 68
DRG: 177 | End: 2023-09-05
Payer: MEDICARE

## 2023-09-05 ENCOUNTER — HOSPITAL ENCOUNTER (INPATIENT)
Age: 68
LOS: 5 days | Discharge: HOME | DRG: 177 | End: 2023-09-10
Attending: STUDENT IN AN ORGANIZED HEALTH CARE EDUCATION/TRAINING PROGRAM | Admitting: SURGERY
Payer: MEDICARE

## 2023-09-05 DIAGNOSIS — I10 BENIGN ESSENTIAL HTN: ICD-10-CM

## 2023-09-05 DIAGNOSIS — I10 UNCONTROLLED HYPERTENSION: ICD-10-CM

## 2023-09-05 DIAGNOSIS — U07.1 COVID: ICD-10-CM

## 2023-09-05 DIAGNOSIS — R06.00 DYSPNEA, UNSPECIFIED TYPE: Primary | ICD-10-CM

## 2023-09-05 LAB
ANION GAP SERPL CALCULATED.3IONS-SCNC: 10 MMOL/L (ref 3–16)
BASE EXCESS BLDV CALC-SCNC: 4 MMOL/L (ref -3–3)
BASOPHILS # BLD: 0 K/UL (ref 0–0.2)
BASOPHILS NFR BLD: 1.2 %
BUN SERPL-MCNC: 7 MG/DL (ref 7–20)
CALCIUM SERPL-MCNC: 9.5 MG/DL (ref 8.3–10.6)
CHLORIDE SERPL-SCNC: 92 MMOL/L (ref 99–110)
CO2 BLDV-SCNC: 28 MMOL/L
CO2 SERPL-SCNC: 28 MMOL/L (ref 21–32)
COHGB MFR BLDV: 1.7 % (ref 0–1.5)
CREAT SERPL-MCNC: 0.7 MG/DL (ref 0.8–1.3)
DEPRECATED RDW RBC AUTO: 15.8 % (ref 12.4–15.4)
EKG ATRIAL RATE: 67 BPM
EKG DIAGNOSIS: NORMAL
EKG P AXIS: 21 DEGREES
EKG P-R INTERVAL: 220 MS
EKG Q-T INTERVAL: 400 MS
EKG QRS DURATION: 82 MS
EKG QTC CALCULATION (BAZETT): 422 MS
EKG R AXIS: -72 DEGREES
EKG T AXIS: 61 DEGREES
EKG VENTRICULAR RATE: 67 BPM
EOSINOPHIL # BLD: 0 K/UL (ref 0–0.6)
EOSINOPHIL NFR BLD: 0.1 %
FLUAV RNA RESP QL NAA+PROBE: NOT DETECTED
FLUBV RNA RESP QL NAA+PROBE: NOT DETECTED
GFR SERPLBLD CREATININE-BSD FMLA CKD-EPI: >60 ML/MIN/{1.73_M2}
GLUCOSE BLD-MCNC: 137 MG/DL (ref 70–99)
GLUCOSE BLD-MCNC: 150 MG/DL (ref 70–99)
GLUCOSE BLD-MCNC: 158 MG/DL (ref 70–99)
GLUCOSE BLD-MCNC: 166 MG/DL (ref 70–99)
GLUCOSE SERPL-MCNC: 163 MG/DL (ref 70–99)
HCO3 BLDV-SCNC: 27.3 MMOL/L (ref 23–29)
HCT VFR BLD AUTO: 45.7 % (ref 40.5–52.5)
HGB BLD-MCNC: 15.6 G/DL (ref 13.5–17.5)
LYMPHOCYTES # BLD: 0.5 K/UL (ref 1–5.1)
LYMPHOCYTES NFR BLD: 14.5 %
MAGNESIUM SERPL-MCNC: 1.7 MG/DL (ref 1.8–2.4)
MCH RBC QN AUTO: 30.7 PG (ref 26–34)
MCHC RBC AUTO-ENTMCNC: 34 G/DL (ref 31–36)
MCV RBC AUTO: 90.3 FL (ref 80–100)
METHGB MFR BLDV: 0.3 %
MONOCYTES # BLD: 0.2 K/UL (ref 0–1.3)
MONOCYTES NFR BLD: 6.3 %
NEUTROPHILS # BLD: 2.8 K/UL (ref 1.7–7.7)
NEUTROPHILS NFR BLD: 77.9 %
NT-PROBNP SERPL-MCNC: 109 PG/ML (ref 0–124)
O2 CT VFR BLDV CALC: 23 VOL %
O2 THERAPY: ABNORMAL
PCO2 BLDV: 36.9 MMHG (ref 40–50)
PERFORMED ON: ABNORMAL
PH BLDV: 7.49 [PH] (ref 7.35–7.45)
PLATELET # BLD AUTO: 192 K/UL (ref 135–450)
PMV BLD AUTO: 8 FL (ref 5–10.5)
PO2 BLDV: 192.3 MMHG (ref 25–40)
POTASSIUM SERPL-SCNC: 3.3 MMOL/L (ref 3.5–5.1)
RBC # BLD AUTO: 5.07 M/UL (ref 4.2–5.9)
SAO2 % BLDV: 99 %
SARS-COV-2 RNA RESP QL NAA+PROBE: DETECTED
SODIUM SERPL-SCNC: 130 MMOL/L (ref 136–145)
TROPONIN, HIGH SENSITIVITY: 10 NG/L (ref 0–22)
TROPONIN, HIGH SENSITIVITY: 11 NG/L (ref 0–22)
WBC # BLD AUTO: 3.6 K/UL (ref 4–11)

## 2023-09-05 PROCEDURE — 96365 THER/PROPH/DIAG IV INF INIT: CPT

## 2023-09-05 PROCEDURE — 71045 X-RAY EXAM CHEST 1 VIEW: CPT

## 2023-09-05 PROCEDURE — 80048 BASIC METABOLIC PNL TOTAL CA: CPT

## 2023-09-05 PROCEDURE — 2580000003 HC RX 258: Performed by: STUDENT IN AN ORGANIZED HEALTH CARE EDUCATION/TRAINING PROGRAM

## 2023-09-05 PROCEDURE — 2580000003 HC RX 258: Performed by: SURGERY

## 2023-09-05 PROCEDURE — 6360000002 HC RX W HCPCS: Performed by: INTERNAL MEDICINE

## 2023-09-05 PROCEDURE — 1200000000 HC SEMI PRIVATE

## 2023-09-05 PROCEDURE — 36415 COLL VENOUS BLD VENIPUNCTURE: CPT

## 2023-09-05 PROCEDURE — 99285 EMERGENCY DEPT VISIT HI MDM: CPT

## 2023-09-05 PROCEDURE — 6370000000 HC RX 637 (ALT 250 FOR IP): Performed by: INTERNAL MEDICINE

## 2023-09-05 PROCEDURE — 6370000000 HC RX 637 (ALT 250 FOR IP): Performed by: SURGERY

## 2023-09-05 PROCEDURE — 96375 TX/PRO/DX INJ NEW DRUG ADDON: CPT

## 2023-09-05 PROCEDURE — 93010 ELECTROCARDIOGRAM REPORT: CPT | Performed by: INTERNAL MEDICINE

## 2023-09-05 PROCEDURE — 99223 1ST HOSP IP/OBS HIGH 75: CPT | Performed by: INTERNAL MEDICINE

## 2023-09-05 PROCEDURE — 6360000002 HC RX W HCPCS: Performed by: SURGERY

## 2023-09-05 PROCEDURE — 83735 ASSAY OF MAGNESIUM: CPT

## 2023-09-05 PROCEDURE — 84484 ASSAY OF TROPONIN QUANT: CPT

## 2023-09-05 PROCEDURE — 87636 SARSCOV2 & INF A&B AMP PRB: CPT

## 2023-09-05 PROCEDURE — 2700000000 HC OXYGEN THERAPY PER DAY

## 2023-09-05 PROCEDURE — 83880 ASSAY OF NATRIURETIC PEPTIDE: CPT

## 2023-09-05 PROCEDURE — 85025 COMPLETE CBC W/AUTO DIFF WBC: CPT

## 2023-09-05 PROCEDURE — 94640 AIRWAY INHALATION TREATMENT: CPT

## 2023-09-05 PROCEDURE — 94761 N-INVAS EAR/PLS OXIMETRY MLT: CPT

## 2023-09-05 PROCEDURE — 82803 BLOOD GASES ANY COMBINATION: CPT

## 2023-09-05 PROCEDURE — 6360000002 HC RX W HCPCS: Performed by: STUDENT IN AN ORGANIZED HEALTH CARE EDUCATION/TRAINING PROGRAM

## 2023-09-05 PROCEDURE — 6370000000 HC RX 637 (ALT 250 FOR IP): Performed by: STUDENT IN AN ORGANIZED HEALTH CARE EDUCATION/TRAINING PROGRAM

## 2023-09-05 RX ORDER — SODIUM CHLORIDE 0.9 % (FLUSH) 0.9 %
5-40 SYRINGE (ML) INJECTION PRN
Status: DISCONTINUED | OUTPATIENT
Start: 2023-09-05 | End: 2023-09-10 | Stop reason: HOSPADM

## 2023-09-05 RX ORDER — METOPROLOL SUCCINATE 50 MG/1
50 TABLET, EXTENDED RELEASE ORAL DAILY
Status: DISCONTINUED | OUTPATIENT
Start: 2023-09-05 | End: 2023-09-10 | Stop reason: HOSPADM

## 2023-09-05 RX ORDER — LANOLIN ALCOHOL/MO/W.PET/CERES
100 CREAM (GRAM) TOPICAL DAILY
Status: DISCONTINUED | OUTPATIENT
Start: 2023-09-05 | End: 2023-09-10 | Stop reason: HOSPADM

## 2023-09-05 RX ORDER — SODIUM CHLORIDE 0.9 % (FLUSH) 0.9 %
5-40 SYRINGE (ML) INJECTION EVERY 12 HOURS SCHEDULED
Status: DISCONTINUED | OUTPATIENT
Start: 2023-09-05 | End: 2023-09-10 | Stop reason: HOSPADM

## 2023-09-05 RX ORDER — OXYCODONE HYDROCHLORIDE 5 MG/1
10 TABLET ORAL 4 TIMES DAILY
Status: DISCONTINUED | OUTPATIENT
Start: 2023-09-05 | End: 2023-09-10 | Stop reason: HOSPADM

## 2023-09-05 RX ORDER — DEXTROSE MONOHYDRATE 100 MG/ML
INJECTION, SOLUTION INTRAVENOUS CONTINUOUS PRN
Status: DISCONTINUED | OUTPATIENT
Start: 2023-09-05 | End: 2023-09-10 | Stop reason: HOSPADM

## 2023-09-05 RX ORDER — ALLOPURINOL 300 MG/1
300 TABLET ORAL DAILY
Status: DISCONTINUED | OUTPATIENT
Start: 2023-09-05 | End: 2023-09-10 | Stop reason: HOSPADM

## 2023-09-05 RX ORDER — OXYCODONE HYDROCHLORIDE AND ACETAMINOPHEN 5; 325 MG/1; MG/1
1 TABLET ORAL ONCE
Status: COMPLETED | OUTPATIENT
Start: 2023-09-05 | End: 2023-09-05

## 2023-09-05 RX ORDER — ONDANSETRON 2 MG/ML
4 INJECTION INTRAMUSCULAR; INTRAVENOUS ONCE
Status: COMPLETED | OUTPATIENT
Start: 2023-09-05 | End: 2023-09-05

## 2023-09-05 RX ORDER — SODIUM CHLORIDE 9 MG/ML
INJECTION, SOLUTION INTRAVENOUS PRN
Status: DISCONTINUED | OUTPATIENT
Start: 2023-09-05 | End: 2023-09-10 | Stop reason: HOSPADM

## 2023-09-05 RX ORDER — ENOXAPARIN SODIUM 100 MG/ML
30 INJECTION SUBCUTANEOUS 2 TIMES DAILY
Status: DISCONTINUED | OUTPATIENT
Start: 2023-09-05 | End: 2023-09-10 | Stop reason: HOSPADM

## 2023-09-05 RX ORDER — ACETAMINOPHEN 650 MG/1
650 SUPPOSITORY RECTAL EVERY 6 HOURS PRN
Status: DISCONTINUED | OUTPATIENT
Start: 2023-09-05 | End: 2023-09-10 | Stop reason: HOSPADM

## 2023-09-05 RX ORDER — INSULIN GLARGINE 100 [IU]/ML
15 INJECTION, SOLUTION SUBCUTANEOUS NIGHTLY
Status: DISCONTINUED | OUTPATIENT
Start: 2023-09-05 | End: 2023-09-05

## 2023-09-05 RX ORDER — ONDANSETRON 4 MG/1
4 TABLET, ORALLY DISINTEGRATING ORAL EVERY 8 HOURS PRN
Status: DISCONTINUED | OUTPATIENT
Start: 2023-09-05 | End: 2023-09-10 | Stop reason: HOSPADM

## 2023-09-05 RX ORDER — INSULIN GLARGINE 100 [IU]/ML
18 INJECTION, SOLUTION SUBCUTANEOUS NIGHTLY
Status: DISCONTINUED | OUTPATIENT
Start: 2023-09-05 | End: 2023-09-10 | Stop reason: HOSPADM

## 2023-09-05 RX ORDER — ACETAMINOPHEN 325 MG/1
650 TABLET ORAL EVERY 6 HOURS PRN
Status: DISCONTINUED | OUTPATIENT
Start: 2023-09-05 | End: 2023-09-10 | Stop reason: HOSPADM

## 2023-09-05 RX ORDER — DILTIAZEM HYDROCHLORIDE 180 MG/1
180 CAPSULE, COATED, EXTENDED RELEASE ORAL DAILY
Status: DISCONTINUED | OUTPATIENT
Start: 2023-09-05 | End: 2023-09-10 | Stop reason: HOSPADM

## 2023-09-05 RX ORDER — POLYETHYLENE GLYCOL 3350 17 G/17G
17 POWDER, FOR SOLUTION ORAL DAILY PRN
Status: DISCONTINUED | OUTPATIENT
Start: 2023-09-05 | End: 2023-09-10 | Stop reason: HOSPADM

## 2023-09-05 RX ORDER — FUROSEMIDE 20 MG/1
20 TABLET ORAL DAILY
Status: DISCONTINUED | OUTPATIENT
Start: 2023-09-05 | End: 2023-09-10 | Stop reason: HOSPADM

## 2023-09-05 RX ORDER — ONDANSETRON 2 MG/ML
4 INJECTION INTRAMUSCULAR; INTRAVENOUS EVERY 6 HOURS PRN
Status: DISCONTINUED | OUTPATIENT
Start: 2023-09-05 | End: 2023-09-10 | Stop reason: HOSPADM

## 2023-09-05 RX ORDER — POTASSIUM CHLORIDE 20 MEQ/1
40 TABLET, EXTENDED RELEASE ORAL ONCE
Status: COMPLETED | OUTPATIENT
Start: 2023-09-05 | End: 2023-09-05

## 2023-09-05 RX ORDER — ALBUTEROL SULFATE 2.5 MG/3ML
2.5 SOLUTION RESPIRATORY (INHALATION) ONCE
Status: COMPLETED | OUTPATIENT
Start: 2023-09-05 | End: 2023-09-05

## 2023-09-05 RX ORDER — ATORVASTATIN CALCIUM 40 MG/1
40 TABLET, FILM COATED ORAL DAILY
Status: DISCONTINUED | OUTPATIENT
Start: 2023-09-05 | End: 2023-09-10 | Stop reason: HOSPADM

## 2023-09-05 RX ORDER — FENTANYL CITRATE 50 UG/ML
50 INJECTION, SOLUTION INTRAMUSCULAR; INTRAVENOUS ONCE
Status: COMPLETED | OUTPATIENT
Start: 2023-09-05 | End: 2023-09-05

## 2023-09-05 RX ORDER — GABAPENTIN 300 MG/1
600 CAPSULE ORAL 3 TIMES DAILY
Status: DISCONTINUED | OUTPATIENT
Start: 2023-09-05 | End: 2023-09-10 | Stop reason: HOSPADM

## 2023-09-05 RX ORDER — IPRATROPIUM BROMIDE AND ALBUTEROL SULFATE 2.5; .5 MG/3ML; MG/3ML
1 SOLUTION RESPIRATORY (INHALATION) 3 TIMES DAILY
Status: DISCONTINUED | OUTPATIENT
Start: 2023-09-05 | End: 2023-09-09

## 2023-09-05 RX ORDER — PANTOPRAZOLE SODIUM 40 MG/1
40 TABLET, DELAYED RELEASE ORAL
Status: DISCONTINUED | OUTPATIENT
Start: 2023-09-05 | End: 2023-09-10 | Stop reason: HOSPADM

## 2023-09-05 RX ORDER — GABAPENTIN 300 MG/1
900 CAPSULE ORAL 3 TIMES DAILY
Status: DISCONTINUED | OUTPATIENT
Start: 2023-09-05 | End: 2023-09-05

## 2023-09-05 RX ORDER — DULOXETIN HYDROCHLORIDE 60 MG/1
60 CAPSULE, DELAYED RELEASE ORAL DAILY
Status: DISCONTINUED | OUTPATIENT
Start: 2023-09-05 | End: 2023-09-10 | Stop reason: HOSPADM

## 2023-09-05 RX ORDER — ALBUTEROL SULFATE 90 UG/1
2 AEROSOL, METERED RESPIRATORY (INHALATION) EVERY 4 HOURS PRN
Status: DISCONTINUED | OUTPATIENT
Start: 2023-09-05 | End: 2023-09-10 | Stop reason: HOSPADM

## 2023-09-05 RX ORDER — DEXAMETHASONE SODIUM PHOSPHATE 4 MG/ML
4 INJECTION, SOLUTION INTRA-ARTICULAR; INTRALESIONAL; INTRAMUSCULAR; INTRAVENOUS; SOFT TISSUE EVERY 8 HOURS
Status: DISCONTINUED | OUTPATIENT
Start: 2023-09-05 | End: 2023-09-06

## 2023-09-05 RX ORDER — IPRATROPIUM BROMIDE AND ALBUTEROL SULFATE 2.5; .5 MG/3ML; MG/3ML
1 SOLUTION RESPIRATORY (INHALATION)
Status: DISCONTINUED | OUTPATIENT
Start: 2023-09-05 | End: 2023-09-05

## 2023-09-05 RX ADMIN — ENOXAPARIN SODIUM 30 MG: 100 INJECTION SUBCUTANEOUS at 20:41

## 2023-09-05 RX ADMIN — FUROSEMIDE 20 MG: 20 TABLET ORAL at 09:43

## 2023-09-05 RX ADMIN — ALLOPURINOL 300 MG: 300 TABLET ORAL at 09:42

## 2023-09-05 RX ADMIN — GABAPENTIN 600 MG: 300 CAPSULE ORAL at 14:08

## 2023-09-05 RX ADMIN — OXYCODONE HYDROCHLORIDE 10 MG: 5 TABLET ORAL at 14:08

## 2023-09-05 RX ADMIN — ENOXAPARIN SODIUM 30 MG: 100 INJECTION SUBCUTANEOUS at 09:43

## 2023-09-05 RX ADMIN — OXYCODONE HYDROCHLORIDE 10 MG: 5 TABLET ORAL at 20:41

## 2023-09-05 RX ADMIN — DEXAMETHASONE SODIUM PHOSPHATE 4 MG: 4 INJECTION INTRA-ARTICULAR; INTRALESIONAL; INTRAMUSCULAR; INTRAVENOUS; SOFT TISSUE at 23:15

## 2023-09-05 RX ADMIN — DILTIAZEM HYDROCHLORIDE 180 MG: 180 CAPSULE, COATED, EXTENDED RELEASE ORAL at 09:42

## 2023-09-05 RX ADMIN — DEXAMETHASONE 6 MG: 4 TABLET ORAL at 06:04

## 2023-09-05 RX ADMIN — FENTANYL CITRATE 50 MCG: 50 INJECTION, SOLUTION INTRAMUSCULAR; INTRAVENOUS at 02:58

## 2023-09-05 RX ADMIN — GABAPENTIN 600 MG: 300 CAPSULE ORAL at 20:41

## 2023-09-05 RX ADMIN — ALBUTEROL SULFATE 2.5 MG: 2.5 SOLUTION RESPIRATORY (INHALATION) at 02:01

## 2023-09-05 RX ADMIN — OXYCODONE HYDROCHLORIDE 10 MG: 5 TABLET ORAL at 17:27

## 2023-09-05 RX ADMIN — POTASSIUM CHLORIDE 40 MEQ: 1500 TABLET, EXTENDED RELEASE ORAL at 12:06

## 2023-09-05 RX ADMIN — ONDANSETRON 4 MG: 2 INJECTION INTRAMUSCULAR; INTRAVENOUS at 02:02

## 2023-09-05 RX ADMIN — OXYCODONE HYDROCHLORIDE AND ACETAMINOPHEN 1 TABLET: 5; 325 TABLET ORAL at 02:59

## 2023-09-05 RX ADMIN — SODIUM CHLORIDE, PRESERVATIVE FREE 10 ML: 5 INJECTION INTRAVENOUS at 20:44

## 2023-09-05 RX ADMIN — IPRATROPIUM BROMIDE AND ALBUTEROL SULFATE 1 DOSE: .5; 2.5 SOLUTION RESPIRATORY (INHALATION) at 15:00

## 2023-09-05 RX ADMIN — Medication 100 MG: at 09:43

## 2023-09-05 RX ADMIN — METOPROLOL SUCCINATE 50 MG: 50 TABLET, EXTENDED RELEASE ORAL at 09:43

## 2023-09-05 RX ADMIN — INSULIN GLARGINE 18 UNITS: 100 INJECTION, SOLUTION SUBCUTANEOUS at 20:41

## 2023-09-05 RX ADMIN — IPRATROPIUM BROMIDE AND ALBUTEROL SULFATE 1 DOSE: 2.5; .5 SOLUTION RESPIRATORY (INHALATION) at 10:17

## 2023-09-05 RX ADMIN — SODIUM CHLORIDE, PRESERVATIVE FREE 10 ML: 5 INJECTION INTRAVENOUS at 12:07

## 2023-09-05 RX ADMIN — IPRATROPIUM BROMIDE AND ALBUTEROL SULFATE 1 DOSE: .5; 2.5 SOLUTION RESPIRATORY (INHALATION) at 23:39

## 2023-09-05 RX ADMIN — AZITHROMYCIN MONOHYDRATE 500 MG: 500 INJECTION, POWDER, LYOPHILIZED, FOR SOLUTION INTRAVENOUS at 20:40

## 2023-09-05 RX ADMIN — PANTOPRAZOLE SODIUM 40 MG: 40 TABLET, DELAYED RELEASE ORAL at 09:43

## 2023-09-05 RX ADMIN — ATORVASTATIN CALCIUM 40 MG: 40 TABLET, FILM COATED ORAL at 09:43

## 2023-09-05 RX ADMIN — CEFTRIAXONE SODIUM 2000 MG: 2 INJECTION, POWDER, FOR SOLUTION INTRAMUSCULAR; INTRAVENOUS at 03:03

## 2023-09-05 RX ADMIN — OXYCODONE HYDROCHLORIDE 10 MG: 5 TABLET ORAL at 09:43

## 2023-09-05 RX ADMIN — DULOXETINE HYDROCHLORIDE 60 MG: 60 CAPSULE, DELAYED RELEASE ORAL at 09:43

## 2023-09-05 ASSESSMENT — PAIN SCALES - WONG BAKER: WONGBAKER_NUMERICALRESPONSE: 0

## 2023-09-05 ASSESSMENT — LIFESTYLE VARIABLES
HOW OFTEN DO YOU HAVE A DRINK CONTAINING ALCOHOL: 2-3 TIMES A WEEK
HOW MANY STANDARD DRINKS CONTAINING ALCOHOL DO YOU HAVE ON A TYPICAL DAY: 1 OR 2

## 2023-09-05 ASSESSMENT — PAIN - FUNCTIONAL ASSESSMENT
PAIN_FUNCTIONAL_ASSESSMENT: ACTIVITIES ARE NOT PREVENTED

## 2023-09-05 ASSESSMENT — PAIN DESCRIPTION - ORIENTATION: ORIENTATION: RIGHT

## 2023-09-05 ASSESSMENT — PAIN DESCRIPTION - LOCATION
LOCATION: GENERALIZED
LOCATION: KNEE
LOCATION: GENERALIZED
LOCATION: GENERALIZED

## 2023-09-05 ASSESSMENT — PAIN DESCRIPTION - DESCRIPTORS
DESCRIPTORS: ACHING

## 2023-09-05 ASSESSMENT — PAIN SCALES - GENERAL
PAINLEVEL_OUTOF10: 5
PAINLEVEL_OUTOF10: 4
PAINLEVEL_OUTOF10: 0
PAINLEVEL_OUTOF10: 3
PAINLEVEL_OUTOF10: 5
PAINLEVEL_OUTOF10: 4
PAINLEVEL_OUTOF10: 10

## 2023-09-05 ASSESSMENT — PAIN DESCRIPTION - PAIN TYPE: TYPE: CHRONIC PAIN

## 2023-09-05 NOTE — H&P
Condition (MA) Reason for Exam: cp, sob, covid + FINDINGS: Pulmonary Arteries: Study mildly limited by respiratory motion artifact. No gross pulmonary embolism identified. Main pulmonary artery is normal in caliber. Mediastinum: No evidence of mediastinal lymphadenopathy. Mild nonspecific left hilar adenopathy. The heart and pericardium demonstrate no acute abnormality. There is no acute abnormality of the thoracic aorta. Extensive calcified/noncalcified plaque of the thoracic aorta and great vessel origins. Lungs/pleura: Small lingular focus of consolidation with minimal disease in the lateral left upper lobe. Left-sided bronchial wall thickening with small areas of peripheral mucous plugging. No pulmonary edema. No evidence of pleural effusion or pneumothorax. Upper Abdomen: Please see separate report. Soft Tissues/Bones: No acute bone or soft tissue abnormality. 1.  No central pulmonary embolism. 2.  Suspected developing left sided bronchopneumonia most pronounced within the lingula. Mild nonspecific left hilar adenopathy. CT chest follow-up recommended in 6-8 weeks following treatment to document resolution. 3.  Additional nonemergent findings, as above.          Electronically signed by Arianne Juarez MD on 9/5/2023 at 3:29 AM

## 2023-09-05 NOTE — ED PROVIDER NOTES
threatening deterioration in the patient's condition which required my urgent intervention. Clinical Impression     1. Dyspnea, unspecified type    2. COVID        Disposition     PATIENT REFERRED TO:  No follow-up provider specified.     DISCHARGE MEDICATIONS:  New Prescriptions    No medications on file       DISPOSITION Decision To Admit 09/05/2023 02:25:39 AM      Julien Pressley MD (electronically signed)  Attending Emergency Physician             Andry Gibson MD  09/05/23 0126

## 2023-09-05 NOTE — CONSULTS
[facility]                                 Pulmonary Consult /Progress Note :                                                                  CHIEF COMPLAINT: worsening SOB ,    Consulting provider: Dr Brendon Momin for consult COVID pneumonia ,worsening SOB     HPI:   Patient is 52-year-old male with mild 60 pack.   Smoking history who presented to the hospital with worsening shortness of breath and cough that has been ongoing for the last few weeks    He states he does not have oxygen at home though he supposed to be at 3 L    He also complains of generalized weakness and fatigue with productive cough of yellow to gray sputum    He tested positive for COVID    He has not been feeling well for about a week      Past Medical History:   Diagnosis Date    Bladder outlet obstruction 03/05/2017    Carpal tunnel syndrome of left wrist 04/02/2013    Cellulitis of right lower extremity 03/27/2023    Cellulitis of right upper extremity 03/01/2023    w/ Group A Strep bacteremia    COPD exacerbation ([address]) 03/02/2023    Cough syncope 01/10/2023    Diabetic ketoacidosis without coma associated with type 2 diabetes mellitus ([address]) 02/18/2018    GI bleed 05/08/2022    Gout 02/01/2013    Hilar adenopathy     Iron deficiency anemia     Malignant neoplasm of urinary bladder ([address]) 02/09/2016    Multiple duodenal ulcers     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

## 2023-09-06 PROBLEM — J44.1 COPD EXACERBATION (HCC): Status: ACTIVE | Noted: 2023-09-06

## 2023-09-06 PROBLEM — J96.21 ACUTE ON CHRONIC RESPIRATORY FAILURE WITH HYPOXIA (HCC): Status: ACTIVE | Noted: 2023-09-06

## 2023-09-06 LAB
ALBUMIN SERPL-MCNC: 3.8 G/DL (ref 3.4–5)
ALBUMIN/GLOB SERPL: 0.8 {RATIO} (ref 1.1–2.2)
ALP SERPL-CCNC: 159 U/L (ref 40–129)
ALT SERPL-CCNC: 32 U/L (ref 10–40)
ANION GAP SERPL CALCULATED.3IONS-SCNC: 13 MMOL/L (ref 3–16)
AST SERPL-CCNC: 45 U/L (ref 15–37)
BASOPHILS # BLD: 0 K/UL (ref 0–0.2)
BASOPHILS NFR BLD: 0.2 %
BILIRUB SERPL-MCNC: 0.4 MG/DL (ref 0–1)
BUN SERPL-MCNC: 12 MG/DL (ref 7–20)
CALCIUM SERPL-MCNC: 9.2 MG/DL (ref 8.3–10.6)
CHLORIDE SERPL-SCNC: 93 MMOL/L (ref 99–110)
CO2 SERPL-SCNC: 28 MMOL/L (ref 21–32)
CREAT SERPL-MCNC: 1.1 MG/DL (ref 0.8–1.3)
DEPRECATED RDW RBC AUTO: 16 % (ref 12.4–15.4)
EOSINOPHIL # BLD: 0 K/UL (ref 0–0.6)
EOSINOPHIL NFR BLD: 0 %
GFR SERPLBLD CREATININE-BSD FMLA CKD-EPI: >60 ML/MIN/{1.73_M2}
GLUCOSE BLD-MCNC: 131 MG/DL (ref 70–99)
GLUCOSE BLD-MCNC: 142 MG/DL (ref 70–99)
GLUCOSE BLD-MCNC: 162 MG/DL (ref 70–99)
GLUCOSE BLD-MCNC: 171 MG/DL (ref 70–99)
GLUCOSE BLD-MCNC: 182 MG/DL (ref 70–99)
GLUCOSE SERPL-MCNC: 146 MG/DL (ref 70–99)
HCT VFR BLD AUTO: 47.2 % (ref 40.5–52.5)
HGB BLD-MCNC: 15.8 G/DL (ref 13.5–17.5)
LYMPHOCYTES # BLD: 0.7 K/UL (ref 1–5.1)
LYMPHOCYTES NFR BLD: 6.2 %
MCH RBC QN AUTO: 31 PG (ref 26–34)
MCHC RBC AUTO-ENTMCNC: 33.6 G/DL (ref 31–36)
MCV RBC AUTO: 92.4 FL (ref 80–100)
MONOCYTES # BLD: 0.7 K/UL (ref 0–1.3)
MONOCYTES NFR BLD: 6.5 %
NEUTROPHILS # BLD: 10 K/UL (ref 1.7–7.7)
NEUTROPHILS NFR BLD: 87.1 %
PERFORMED ON: ABNORMAL
PLATELET # BLD AUTO: 242 K/UL (ref 135–450)
PMV BLD AUTO: 8.2 FL (ref 5–10.5)
POTASSIUM SERPL-SCNC: 4.3 MMOL/L (ref 3.5–5.1)
PROT SERPL-MCNC: 8.5 G/DL (ref 6.4–8.2)
RBC # BLD AUTO: 5.11 M/UL (ref 4.2–5.9)
SODIUM SERPL-SCNC: 134 MMOL/L (ref 136–145)
WBC # BLD AUTO: 11.5 K/UL (ref 4–11)

## 2023-09-06 PROCEDURE — 80053 COMPREHEN METABOLIC PANEL: CPT

## 2023-09-06 PROCEDURE — 94010 BREATHING CAPACITY TEST: CPT

## 2023-09-06 PROCEDURE — 2580000003 HC RX 258: Performed by: SURGERY

## 2023-09-06 PROCEDURE — 6360000002 HC RX W HCPCS: Performed by: SURGERY

## 2023-09-06 PROCEDURE — 99233 SBSQ HOSP IP/OBS HIGH 50: CPT | Performed by: INTERNAL MEDICINE

## 2023-09-06 PROCEDURE — 94640 AIRWAY INHALATION TREATMENT: CPT

## 2023-09-06 PROCEDURE — 99232 SBSQ HOSP IP/OBS MODERATE 35: CPT | Performed by: INTERNAL MEDICINE

## 2023-09-06 PROCEDURE — 94761 N-INVAS EAR/PLS OXIMETRY MLT: CPT

## 2023-09-06 PROCEDURE — 2700000000 HC OXYGEN THERAPY PER DAY

## 2023-09-06 PROCEDURE — 1200000000 HC SEMI PRIVATE

## 2023-09-06 PROCEDURE — 6370000000 HC RX 637 (ALT 250 FOR IP): Performed by: SURGERY

## 2023-09-06 PROCEDURE — 6370000000 HC RX 637 (ALT 250 FOR IP): Performed by: INTERNAL MEDICINE

## 2023-09-06 PROCEDURE — 36415 COLL VENOUS BLD VENIPUNCTURE: CPT

## 2023-09-06 PROCEDURE — 6360000002 HC RX W HCPCS: Performed by: INTERNAL MEDICINE

## 2023-09-06 PROCEDURE — 85025 COMPLETE CBC W/AUTO DIFF WBC: CPT

## 2023-09-06 RX ORDER — AZITHROMYCIN 250 MG/1
500 TABLET, FILM COATED ORAL EVERY EVENING
Status: DISCONTINUED | OUTPATIENT
Start: 2023-09-07 | End: 2023-09-07

## 2023-09-06 RX ORDER — DEXAMETHASONE 4 MG/1
4 TABLET ORAL EVERY 8 HOURS SCHEDULED
Status: DISCONTINUED | OUTPATIENT
Start: 2023-09-06 | End: 2023-09-10 | Stop reason: HOSPADM

## 2023-09-06 RX ADMIN — Medication 100 MG: at 08:37

## 2023-09-06 RX ADMIN — DEXAMETHASONE SODIUM PHOSPHATE 4 MG: 4 INJECTION INTRA-ARTICULAR; INTRALESIONAL; INTRAMUSCULAR; INTRAVENOUS; SOFT TISSUE at 05:06

## 2023-09-06 RX ADMIN — ATORVASTATIN CALCIUM 40 MG: 40 TABLET, FILM COATED ORAL at 08:37

## 2023-09-06 RX ADMIN — PANTOPRAZOLE SODIUM 40 MG: 40 TABLET, DELAYED RELEASE ORAL at 05:06

## 2023-09-06 RX ADMIN — POLYETHYLENE GLYCOL (3350) 17 G: 17 POWDER, FOR SOLUTION ORAL at 17:02

## 2023-09-06 RX ADMIN — DEXAMETHASONE 4 MG: 4 TABLET ORAL at 21:37

## 2023-09-06 RX ADMIN — OXYCODONE HYDROCHLORIDE 10 MG: 5 TABLET ORAL at 17:02

## 2023-09-06 RX ADMIN — OXYCODONE HYDROCHLORIDE 10 MG: 5 TABLET ORAL at 14:20

## 2023-09-06 RX ADMIN — GABAPENTIN 600 MG: 300 CAPSULE ORAL at 08:37

## 2023-09-06 RX ADMIN — FUROSEMIDE 20 MG: 20 TABLET ORAL at 08:37

## 2023-09-06 RX ADMIN — DILTIAZEM HYDROCHLORIDE 180 MG: 180 CAPSULE, COATED, EXTENDED RELEASE ORAL at 08:37

## 2023-09-06 RX ADMIN — GABAPENTIN 600 MG: 300 CAPSULE ORAL at 14:20

## 2023-09-06 RX ADMIN — DEXAMETHASONE SODIUM PHOSPHATE 4 MG: 4 INJECTION INTRA-ARTICULAR; INTRALESIONAL; INTRAMUSCULAR; INTRAVENOUS; SOFT TISSUE at 14:20

## 2023-09-06 RX ADMIN — IPRATROPIUM BROMIDE AND ALBUTEROL SULFATE 1 DOSE: .5; 2.5 SOLUTION RESPIRATORY (INHALATION) at 07:17

## 2023-09-06 RX ADMIN — OXYCODONE HYDROCHLORIDE 10 MG: 5 TABLET ORAL at 21:37

## 2023-09-06 RX ADMIN — ALLOPURINOL 300 MG: 300 TABLET ORAL at 08:37

## 2023-09-06 RX ADMIN — ENOXAPARIN SODIUM 30 MG: 100 INJECTION SUBCUTANEOUS at 21:36

## 2023-09-06 RX ADMIN — INSULIN GLARGINE 18 UNITS: 100 INJECTION, SOLUTION SUBCUTANEOUS at 21:37

## 2023-09-06 RX ADMIN — SODIUM CHLORIDE, PRESERVATIVE FREE 10 ML: 5 INJECTION INTRAVENOUS at 21:38

## 2023-09-06 RX ADMIN — CEFTRIAXONE SODIUM 1000 MG: 1 INJECTION, POWDER, FOR SOLUTION INTRAMUSCULAR; INTRAVENOUS at 03:09

## 2023-09-06 RX ADMIN — SODIUM CHLORIDE, PRESERVATIVE FREE 10 ML: 5 INJECTION INTRAVENOUS at 08:38

## 2023-09-06 RX ADMIN — GABAPENTIN 600 MG: 300 CAPSULE ORAL at 21:37

## 2023-09-06 RX ADMIN — ENOXAPARIN SODIUM 30 MG: 100 INJECTION SUBCUTANEOUS at 08:37

## 2023-09-06 RX ADMIN — METOPROLOL SUCCINATE 50 MG: 50 TABLET, EXTENDED RELEASE ORAL at 08:37

## 2023-09-06 RX ADMIN — IPRATROPIUM BROMIDE AND ALBUTEROL SULFATE 1 DOSE: .5; 2.5 SOLUTION RESPIRATORY (INHALATION) at 13:17

## 2023-09-06 RX ADMIN — DULOXETINE HYDROCHLORIDE 60 MG: 60 CAPSULE, DELAYED RELEASE ORAL at 08:37

## 2023-09-06 RX ADMIN — IPRATROPIUM BROMIDE AND ALBUTEROL SULFATE 1 DOSE: .5; 2.5 SOLUTION RESPIRATORY (INHALATION) at 20:47

## 2023-09-06 RX ADMIN — OXYCODONE HYDROCHLORIDE 10 MG: 5 TABLET ORAL at 08:37

## 2023-09-06 ASSESSMENT — COPD QUESTIONNAIRES
CAT_TOTALSCORE: 35
QUESTION2_CHESTPHLEGM: 4
QUESTION3_CHESTTIGHTNESS: 5
QUESTION7_SLEEPQUALITY: 4
QUESTION5_HOMEACTIVITIES: 5
QUESTION8_ENERGYLEVEL: 4
QUESTION1_COUGHFREQUENCY: 4
QUESTION4_WALKINCLINE: 5
QUESTION6_LEAVINGHOUSE: 4

## 2023-09-06 ASSESSMENT — PAIN DESCRIPTION - ORIENTATION
ORIENTATION: RIGHT;LEFT

## 2023-09-06 ASSESSMENT — PAIN DESCRIPTION - LOCATION
LOCATION: FOOT;BACK
LOCATION: KNEE;BACK
LOCATION: KNEE;BACK
LOCATION: KNEE;FACE
LOCATION: BACK;KNEE

## 2023-09-06 ASSESSMENT — PAIN DESCRIPTION - DESCRIPTORS
DESCRIPTORS: ACHING

## 2023-09-06 ASSESSMENT — PAIN SCALES - GENERAL
PAINLEVEL_OUTOF10: 5
PAINLEVEL_OUTOF10: 5
PAINLEVEL_OUTOF10: 4
PAINLEVEL_OUTOF10: 5
PAINLEVEL_OUTOF10: 4

## 2023-09-06 NOTE — FLOWSHEET NOTE
09/05/23 2020   Vital Signs   Level of Consciousness 0   Pain Assessment   Pain Assessment 0-10   Pain Level 5   Patient's Stated Pain Goal 0 - No pain   Pain Location Knee   Pain Orientation Right   Pain Descriptors Aching   Functional Pain Assessment Activities are not prevented   Pain Type Chronic pain   Non-Pharmaceutical Pain Intervention(s) Rest;Elevation   Opioid-Induced Sedation   POSS Score 1     Shift assessment is complete, see flow sheet. Patient denies further needs at this time. Call light is within reach and bed alarm on.

## 2023-09-06 NOTE — PLAN OF CARE
Problem: Discharge Planning  Goal: Discharge to home or other facility with appropriate resources  Outcome: Progressing  Flowsheets (Taken 9/5/2023 1220 by Joelle Patterson RN)  Discharge to home or other facility with appropriate resources: Identify barriers to discharge with patient and caregiver     Problem: Chronic Conditions and Co-morbidities  Goal: Patient's chronic conditions and co-morbidity symptoms are monitored and maintained or improved  Outcome: Progressing     Problem: Respiratory - Adult  Goal: Achieves optimal ventilation and oxygenation  Outcome: Progressing     Problem: Safety - Adult  Goal: Free from fall injury  Outcome: Progressing     Problem: ABCDS Injury Assessment  Goal: Absence of physical injury  Outcome: Progressing     Problem: Pain  Goal: Verbalizes/displays adequate comfort level or baseline comfort level  Outcome: Progressing

## 2023-09-07 LAB
GLUCOSE BLD-MCNC: 126 MG/DL (ref 70–99)
GLUCOSE BLD-MCNC: 137 MG/DL (ref 70–99)
GLUCOSE BLD-MCNC: 141 MG/DL (ref 70–99)
GLUCOSE BLD-MCNC: 152 MG/DL (ref 70–99)
GLUCOSE BLD-MCNC: 187 MG/DL (ref 70–99)
ORGANISM: ABNORMAL
PERFORMED ON: ABNORMAL
PNEUMONIA PANEL MOLECULAR: ABNORMAL
REPORT: NORMAL

## 2023-09-07 PROCEDURE — 2580000003 HC RX 258: Performed by: SURGERY

## 2023-09-07 PROCEDURE — 2580000003 HC RX 258: Performed by: STUDENT IN AN ORGANIZED HEALTH CARE EDUCATION/TRAINING PROGRAM

## 2023-09-07 PROCEDURE — 6370000000 HC RX 637 (ALT 250 FOR IP): Performed by: SURGERY

## 2023-09-07 PROCEDURE — 1200000000 HC SEMI PRIVATE

## 2023-09-07 PROCEDURE — 6360000002 HC RX W HCPCS: Performed by: SURGERY

## 2023-09-07 PROCEDURE — 94640 AIRWAY INHALATION TREATMENT: CPT

## 2023-09-07 PROCEDURE — 6360000002 HC RX W HCPCS: Performed by: STUDENT IN AN ORGANIZED HEALTH CARE EDUCATION/TRAINING PROGRAM

## 2023-09-07 PROCEDURE — 99233 SBSQ HOSP IP/OBS HIGH 50: CPT | Performed by: INTERNAL MEDICINE

## 2023-09-07 PROCEDURE — 99232 SBSQ HOSP IP/OBS MODERATE 35: CPT | Performed by: INTERNAL MEDICINE

## 2023-09-07 PROCEDURE — 6360000002 HC RX W HCPCS: Performed by: INTERNAL MEDICINE

## 2023-09-07 PROCEDURE — 6370000000 HC RX 637 (ALT 250 FOR IP): Performed by: INTERNAL MEDICINE

## 2023-09-07 PROCEDURE — 2700000000 HC OXYGEN THERAPY PER DAY

## 2023-09-07 PROCEDURE — 87633 RESP VIRUS 12-25 TARGETS: CPT

## 2023-09-07 PROCEDURE — 94761 N-INVAS EAR/PLS OXIMETRY MLT: CPT

## 2023-09-07 RX ORDER — GUAIFENESIN 600 MG/1
600 TABLET, EXTENDED RELEASE ORAL 2 TIMES DAILY
Status: DISCONTINUED | OUTPATIENT
Start: 2023-09-07 | End: 2023-09-07

## 2023-09-07 RX ORDER — LACTULOSE 10 G/15ML
20 SOLUTION ORAL ONCE
Status: COMPLETED | OUTPATIENT
Start: 2023-09-07 | End: 2023-09-07

## 2023-09-07 RX ORDER — BUDESONIDE 0.5 MG/2ML
1 INHALANT ORAL
Status: DISCONTINUED | OUTPATIENT
Start: 2023-09-07 | End: 2023-09-10 | Stop reason: HOSPADM

## 2023-09-07 RX ORDER — GUAIFENESIN 600 MG/1
600 TABLET, EXTENDED RELEASE ORAL 2 TIMES DAILY
Status: DISCONTINUED | OUTPATIENT
Start: 2023-09-07 | End: 2023-09-10 | Stop reason: HOSPADM

## 2023-09-07 RX ADMIN — IPRATROPIUM BROMIDE AND ALBUTEROL SULFATE 1 DOSE: .5; 2.5 SOLUTION RESPIRATORY (INHALATION) at 07:17

## 2023-09-07 RX ADMIN — ALLOPURINOL 300 MG: 300 TABLET ORAL at 08:43

## 2023-09-07 RX ADMIN — GABAPENTIN 600 MG: 300 CAPSULE ORAL at 13:22

## 2023-09-07 RX ADMIN — VANCOMYCIN HYDROCHLORIDE 1750 MG: 10 INJECTION, POWDER, LYOPHILIZED, FOR SOLUTION INTRAVENOUS at 22:20

## 2023-09-07 RX ADMIN — OXYCODONE HYDROCHLORIDE 10 MG: 5 TABLET ORAL at 08:41

## 2023-09-07 RX ADMIN — SODIUM CHLORIDE, PRESERVATIVE FREE 10 ML: 5 INJECTION INTRAVENOUS at 08:45

## 2023-09-07 RX ADMIN — LACTULOSE 20 G: 10 SOLUTION ORAL at 13:22

## 2023-09-07 RX ADMIN — OXYCODONE HYDROCHLORIDE 10 MG: 5 TABLET ORAL at 13:22

## 2023-09-07 RX ADMIN — GUAIFENESIN 600 MG: 600 TABLET, EXTENDED RELEASE ORAL at 20:19

## 2023-09-07 RX ADMIN — INSULIN GLARGINE 18 UNITS: 100 INJECTION, SOLUTION SUBCUTANEOUS at 20:20

## 2023-09-07 RX ADMIN — CEFEPIME 2000 MG: 2 INJECTION, POWDER, FOR SOLUTION INTRAVENOUS at 20:17

## 2023-09-07 RX ADMIN — FUROSEMIDE 20 MG: 20 TABLET ORAL at 08:44

## 2023-09-07 RX ADMIN — DEXAMETHASONE 4 MG: 4 TABLET ORAL at 05:41

## 2023-09-07 RX ADMIN — GABAPENTIN 600 MG: 300 CAPSULE ORAL at 08:41

## 2023-09-07 RX ADMIN — ENOXAPARIN SODIUM 30 MG: 100 INJECTION SUBCUTANEOUS at 08:40

## 2023-09-07 RX ADMIN — IPRATROPIUM BROMIDE AND ALBUTEROL SULFATE 1 DOSE: .5; 2.5 SOLUTION RESPIRATORY (INHALATION) at 14:31

## 2023-09-07 RX ADMIN — OXYCODONE HYDROCHLORIDE 10 MG: 5 TABLET ORAL at 20:19

## 2023-09-07 RX ADMIN — METOPROLOL SUCCINATE 50 MG: 50 TABLET, EXTENDED RELEASE ORAL at 08:43

## 2023-09-07 RX ADMIN — AZITHROMYCIN 500 MG: 250 TABLET, FILM COATED ORAL at 17:27

## 2023-09-07 RX ADMIN — DEXAMETHASONE 4 MG: 4 TABLET ORAL at 23:02

## 2023-09-07 RX ADMIN — OXYCODONE HYDROCHLORIDE 10 MG: 5 TABLET ORAL at 17:27

## 2023-09-07 RX ADMIN — GUAIFENESIN 600 MG: 600 TABLET, EXTENDED RELEASE ORAL at 13:22

## 2023-09-07 RX ADMIN — ENOXAPARIN SODIUM 30 MG: 100 INJECTION SUBCUTANEOUS at 20:19

## 2023-09-07 RX ADMIN — Medication 100 MG: at 08:43

## 2023-09-07 RX ADMIN — DULOXETINE HYDROCHLORIDE 60 MG: 60 CAPSULE, DELAYED RELEASE ORAL at 08:43

## 2023-09-07 RX ADMIN — IPRATROPIUM BROMIDE AND ALBUTEROL SULFATE 1 DOSE: .5; 2.5 SOLUTION RESPIRATORY (INHALATION) at 20:27

## 2023-09-07 RX ADMIN — GABAPENTIN 600 MG: 300 CAPSULE ORAL at 20:19

## 2023-09-07 RX ADMIN — CEFTRIAXONE SODIUM 1000 MG: 1 INJECTION, POWDER, FOR SOLUTION INTRAMUSCULAR; INTRAVENOUS at 02:32

## 2023-09-07 RX ADMIN — DILTIAZEM HYDROCHLORIDE 180 MG: 180 CAPSULE, COATED, EXTENDED RELEASE ORAL at 08:41

## 2023-09-07 RX ADMIN — PANTOPRAZOLE SODIUM 40 MG: 40 TABLET, DELAYED RELEASE ORAL at 05:41

## 2023-09-07 RX ADMIN — SODIUM CHLORIDE, PRESERVATIVE FREE 10 ML: 5 INJECTION INTRAVENOUS at 20:20

## 2023-09-07 RX ADMIN — DEXAMETHASONE 4 MG: 4 TABLET ORAL at 13:22

## 2023-09-07 RX ADMIN — ATORVASTATIN CALCIUM 40 MG: 40 TABLET, FILM COATED ORAL at 08:44

## 2023-09-07 ASSESSMENT — PAIN DESCRIPTION - LOCATION
LOCATION: CHEST;BACK;KNEE
LOCATION: BACK

## 2023-09-07 ASSESSMENT — PAIN SCALES - GENERAL
PAINLEVEL_OUTOF10: 4
PAINLEVEL_OUTOF10: 4
PAINLEVEL_OUTOF10: 5
PAINLEVEL_OUTOF10: 5
PAINLEVEL_OUTOF10: 4
PAINLEVEL_OUTOF10: 5
PAINLEVEL_OUTOF10: 5
PAINLEVEL_OUTOF10: 4

## 2023-09-07 NOTE — PLAN OF CARE
Problem: Discharge Planning  Goal: Discharge to home or other facility with appropriate resources  Outcome: Progressing     Problem: Chronic Conditions and Co-morbidities  Goal: Patient's chronic conditions and co-morbidity symptoms are monitored and maintained or improved  Outcome: Progressing     Problem: Respiratory - Adult  Goal: Achieves optimal ventilation and oxygenation  Outcome: Progressing     Problem: Safety - Adult  Goal: Free from fall injury  Outcome: Progressing     Problem: ABCDS Injury Assessment  Goal: Absence of physical injury  Outcome: Progressing     Problem: Pain  Goal: Verbalizes/displays adequate comfort level or baseline comfort level  Outcome: Progressing

## 2023-09-08 ENCOUNTER — APPOINTMENT (OUTPATIENT)
Dept: GENERAL RADIOLOGY | Age: 68
DRG: 177 | End: 2023-09-08
Payer: MEDICARE

## 2023-09-08 LAB
ANION GAP SERPL CALCULATED.3IONS-SCNC: 9 MMOL/L (ref 3–16)
BUN SERPL-MCNC: 19 MG/DL (ref 7–20)
CALCIUM SERPL-MCNC: 9.3 MG/DL (ref 8.3–10.6)
CHLORIDE SERPL-SCNC: 98 MMOL/L (ref 99–110)
CO2 SERPL-SCNC: 30 MMOL/L (ref 21–32)
CREAT SERPL-MCNC: 0.9 MG/DL (ref 0.8–1.3)
DEPRECATED RDW RBC AUTO: 16.1 % (ref 12.4–15.4)
GFR SERPLBLD CREATININE-BSD FMLA CKD-EPI: >60 ML/MIN/{1.73_M2}
GLUCOSE BLD-MCNC: 153 MG/DL (ref 70–99)
GLUCOSE BLD-MCNC: 154 MG/DL (ref 70–99)
GLUCOSE BLD-MCNC: 174 MG/DL (ref 70–99)
GLUCOSE BLD-MCNC: 245 MG/DL (ref 70–99)
GLUCOSE SERPL-MCNC: 120 MG/DL (ref 70–99)
HCT VFR BLD AUTO: 46.6 % (ref 40.5–52.5)
HGB BLD-MCNC: 15.3 G/DL (ref 13.5–17.5)
MCH RBC QN AUTO: 30.5 PG (ref 26–34)
MCHC RBC AUTO-ENTMCNC: 32.8 G/DL (ref 31–36)
MCV RBC AUTO: 93 FL (ref 80–100)
PERFORMED ON: ABNORMAL
PLATELET # BLD AUTO: 227 K/UL (ref 135–450)
PMV BLD AUTO: 7.9 FL (ref 5–10.5)
POTASSIUM SERPL-SCNC: 4.6 MMOL/L (ref 3.5–5.1)
RBC # BLD AUTO: 5.01 M/UL (ref 4.2–5.9)
SODIUM SERPL-SCNC: 137 MMOL/L (ref 136–145)
WBC # BLD AUTO: 11.1 K/UL (ref 4–11)

## 2023-09-08 PROCEDURE — 6360000002 HC RX W HCPCS: Performed by: INTERNAL MEDICINE

## 2023-09-08 PROCEDURE — 99232 SBSQ HOSP IP/OBS MODERATE 35: CPT | Performed by: INTERNAL MEDICINE

## 2023-09-08 PROCEDURE — 36415 COLL VENOUS BLD VENIPUNCTURE: CPT

## 2023-09-08 PROCEDURE — 94640 AIRWAY INHALATION TREATMENT: CPT

## 2023-09-08 PROCEDURE — 1200000000 HC SEMI PRIVATE

## 2023-09-08 PROCEDURE — 85027 COMPLETE CBC AUTOMATED: CPT

## 2023-09-08 PROCEDURE — 71045 X-RAY EXAM CHEST 1 VIEW: CPT

## 2023-09-08 PROCEDURE — 2700000000 HC OXYGEN THERAPY PER DAY

## 2023-09-08 PROCEDURE — 94761 N-INVAS EAR/PLS OXIMETRY MLT: CPT

## 2023-09-08 PROCEDURE — 6370000000 HC RX 637 (ALT 250 FOR IP): Performed by: INTERNAL MEDICINE

## 2023-09-08 PROCEDURE — 6360000002 HC RX W HCPCS: Performed by: SURGERY

## 2023-09-08 PROCEDURE — 2580000003 HC RX 258: Performed by: SURGERY

## 2023-09-08 PROCEDURE — 80048 BASIC METABOLIC PNL TOTAL CA: CPT

## 2023-09-08 PROCEDURE — 2580000003 HC RX 258: Performed by: STUDENT IN AN ORGANIZED HEALTH CARE EDUCATION/TRAINING PROGRAM

## 2023-09-08 PROCEDURE — 6360000002 HC RX W HCPCS: Performed by: STUDENT IN AN ORGANIZED HEALTH CARE EDUCATION/TRAINING PROGRAM

## 2023-09-08 PROCEDURE — 6370000000 HC RX 637 (ALT 250 FOR IP): Performed by: SURGERY

## 2023-09-08 RX ADMIN — OXYCODONE HYDROCHLORIDE 10 MG: 5 TABLET ORAL at 17:36

## 2023-09-08 RX ADMIN — DEXAMETHASONE 4 MG: 4 TABLET ORAL at 21:41

## 2023-09-08 RX ADMIN — ALLOPURINOL 300 MG: 300 TABLET ORAL at 08:55

## 2023-09-08 RX ADMIN — GUAIFENESIN 600 MG: 600 TABLET, EXTENDED RELEASE ORAL at 08:55

## 2023-09-08 RX ADMIN — GABAPENTIN 600 MG: 300 CAPSULE ORAL at 21:41

## 2023-09-08 RX ADMIN — METOPROLOL SUCCINATE 50 MG: 50 TABLET, EXTENDED RELEASE ORAL at 08:54

## 2023-09-08 RX ADMIN — INSULIN GLARGINE 18 UNITS: 100 INJECTION, SOLUTION SUBCUTANEOUS at 21:41

## 2023-09-08 RX ADMIN — PANTOPRAZOLE SODIUM 40 MG: 40 TABLET, DELAYED RELEASE ORAL at 05:45

## 2023-09-08 RX ADMIN — CEFEPIME 2000 MG: 2 INJECTION, POWDER, FOR SOLUTION INTRAVENOUS at 21:48

## 2023-09-08 RX ADMIN — SODIUM CHLORIDE, PRESERVATIVE FREE 10 ML: 5 INJECTION INTRAVENOUS at 08:57

## 2023-09-08 RX ADMIN — ENOXAPARIN SODIUM 30 MG: 100 INJECTION SUBCUTANEOUS at 08:55

## 2023-09-08 RX ADMIN — FUROSEMIDE 20 MG: 20 TABLET ORAL at 08:55

## 2023-09-08 RX ADMIN — Medication 100 MG: at 08:54

## 2023-09-08 RX ADMIN — CEFEPIME 2000 MG: 2 INJECTION, POWDER, FOR SOLUTION INTRAVENOUS at 13:46

## 2023-09-08 RX ADMIN — VANCOMYCIN HYDROCHLORIDE 1500 MG: 10 INJECTION, POWDER, LYOPHILIZED, FOR SOLUTION INTRAVENOUS at 23:27

## 2023-09-08 RX ADMIN — DEXAMETHASONE 4 MG: 4 TABLET ORAL at 04:19

## 2023-09-08 RX ADMIN — GABAPENTIN 600 MG: 300 CAPSULE ORAL at 08:54

## 2023-09-08 RX ADMIN — GUAIFENESIN 600 MG: 600 TABLET, EXTENDED RELEASE ORAL at 00:15

## 2023-09-08 RX ADMIN — IPRATROPIUM BROMIDE AND ALBUTEROL SULFATE 1 DOSE: .5; 2.5 SOLUTION RESPIRATORY (INHALATION) at 08:33

## 2023-09-08 RX ADMIN — CEFEPIME 2000 MG: 2 INJECTION, POWDER, FOR SOLUTION INTRAVENOUS at 04:19

## 2023-09-08 RX ADMIN — IPRATROPIUM BROMIDE AND ALBUTEROL SULFATE 1 DOSE: .5; 2.5 SOLUTION RESPIRATORY (INHALATION) at 15:07

## 2023-09-08 RX ADMIN — DEXAMETHASONE 4 MG: 4 TABLET ORAL at 13:50

## 2023-09-08 RX ADMIN — GUAIFENESIN 600 MG: 600 TABLET, EXTENDED RELEASE ORAL at 21:41

## 2023-09-08 RX ADMIN — ATORVASTATIN CALCIUM 40 MG: 40 TABLET, FILM COATED ORAL at 08:55

## 2023-09-08 RX ADMIN — IPRATROPIUM BROMIDE AND ALBUTEROL SULFATE 1 DOSE: .5; 2.5 SOLUTION RESPIRATORY (INHALATION) at 20:57

## 2023-09-08 RX ADMIN — OXYCODONE HYDROCHLORIDE 10 MG: 5 TABLET ORAL at 13:50

## 2023-09-08 RX ADMIN — BUDESONIDE INHALATION 1000 MCG: 0.5 SUSPENSION RESPIRATORY (INHALATION) at 20:57

## 2023-09-08 RX ADMIN — ENOXAPARIN SODIUM 30 MG: 100 INJECTION SUBCUTANEOUS at 21:41

## 2023-09-08 RX ADMIN — OXYCODONE HYDROCHLORIDE 10 MG: 5 TABLET ORAL at 08:54

## 2023-09-08 RX ADMIN — GABAPENTIN 600 MG: 300 CAPSULE ORAL at 13:50

## 2023-09-08 RX ADMIN — DILTIAZEM HYDROCHLORIDE 180 MG: 180 CAPSULE, COATED, EXTENDED RELEASE ORAL at 08:54

## 2023-09-08 RX ADMIN — BUDESONIDE INHALATION 1000 MCG: 0.5 SUSPENSION RESPIRATORY (INHALATION) at 08:33

## 2023-09-08 RX ADMIN — OXYCODONE HYDROCHLORIDE 10 MG: 5 TABLET ORAL at 21:41

## 2023-09-08 RX ADMIN — DULOXETINE HYDROCHLORIDE 60 MG: 60 CAPSULE, DELAYED RELEASE ORAL at 08:55

## 2023-09-08 ASSESSMENT — PAIN SCALES - GENERAL
PAINLEVEL_OUTOF10: 6
PAINLEVEL_OUTOF10: 3
PAINLEVEL_OUTOF10: 3
PAINLEVEL_OUTOF10: 5

## 2023-09-08 ASSESSMENT — PAIN DESCRIPTION - LOCATION
LOCATION: HIP
LOCATION: KNEE;FOOT
LOCATION: CHEST;OTHER (COMMENT)
LOCATION: LEG;KNEE

## 2023-09-08 ASSESSMENT — PAIN DESCRIPTION - DESCRIPTORS: DESCRIPTORS: SHARP

## 2023-09-08 ASSESSMENT — PAIN DESCRIPTION - ORIENTATION
ORIENTATION: LEFT;RIGHT
ORIENTATION: RIGHT;LEFT
ORIENTATION: RIGHT;LEFT

## 2023-09-08 NOTE — PLAN OF CARE
Problem: Discharge Planning  Goal: Discharge to home or other facility with appropriate resources  9/8/2023 1009 by Imelda Harman RN  Outcome: Progressing  9/7/2023 2208 by Samantha Aranda RN  Outcome: Progressing     Problem: Chronic Conditions and Co-morbidities  Goal: Patient's chronic conditions and co-morbidity symptoms are monitored and maintained or improved  9/8/2023 1009 by Imelda Harman RN  Outcome: Progressing  9/7/2023 2208 by Samantha Aranda RN  Outcome: Progressing     Problem: Respiratory - Adult  Goal: Achieves optimal ventilation and oxygenation  9/8/2023 1009 by Imelda Harman RN  Outcome: Progressing  9/7/2023 2208 by Samantha Aranda RN  Outcome: Progressing     Problem: Safety - Adult  Goal: Free from fall injury  9/8/2023 1009 by Imelda Harman RN  Outcome: Progressing  9/7/2023 2208 by Samantha Aranda RN  Outcome: Progressing     Problem: ABCDS Injury Assessment  Goal: Absence of physical injury  9/8/2023 1009 by Imelda Harman RN  Outcome: Progressing  9/7/2023 2208 by Samantha Aranda RN  Outcome: Progressing     Problem: Pain  Goal: Verbalizes/displays adequate comfort level or baseline comfort level  9/8/2023 1009 by Imelda Harman RN  Outcome: Progressing  9/7/2023 2208 by Samantha Aranda RN  Outcome: Progressing

## 2023-09-09 LAB
ANION GAP SERPL CALCULATED.3IONS-SCNC: 13 MMOL/L (ref 3–16)
BUN SERPL-MCNC: 14 MG/DL (ref 7–20)
CALCIUM SERPL-MCNC: 8.8 MG/DL (ref 8.3–10.6)
CHLORIDE SERPL-SCNC: 99 MMOL/L (ref 99–110)
CO2 SERPL-SCNC: 23 MMOL/L (ref 21–32)
CREAT SERPL-MCNC: 0.7 MG/DL (ref 0.8–1.3)
DEPRECATED RDW RBC AUTO: 15.6 % (ref 12.4–15.4)
GFR SERPLBLD CREATININE-BSD FMLA CKD-EPI: >60 ML/MIN/{1.73_M2}
GLUCOSE BLD-MCNC: 122 MG/DL (ref 70–99)
GLUCOSE BLD-MCNC: 174 MG/DL (ref 70–99)
GLUCOSE BLD-MCNC: 185 MG/DL (ref 70–99)
GLUCOSE BLD-MCNC: 186 MG/DL (ref 70–99)
GLUCOSE BLD-MCNC: 190 MG/DL (ref 70–99)
GLUCOSE SERPL-MCNC: 134 MG/DL (ref 70–99)
HCT VFR BLD AUTO: 45.8 % (ref 40.5–52.5)
HGB BLD-MCNC: 15.3 G/DL (ref 13.5–17.5)
MCH RBC QN AUTO: 31 PG (ref 26–34)
MCHC RBC AUTO-ENTMCNC: 33.4 G/DL (ref 31–36)
MCV RBC AUTO: 92.8 FL (ref 80–100)
PERFORMED ON: ABNORMAL
PLATELET # BLD AUTO: 225 K/UL (ref 135–450)
PMV BLD AUTO: 7.9 FL (ref 5–10.5)
POTASSIUM SERPL-SCNC: 4 MMOL/L (ref 3.5–5.1)
RBC # BLD AUTO: 4.94 M/UL (ref 4.2–5.9)
SODIUM SERPL-SCNC: 135 MMOL/L (ref 136–145)
VANCOMYCIN TROUGH SERPL-MCNC: 7.3 UG/ML (ref 10–20)
WBC # BLD AUTO: 9.8 K/UL (ref 4–11)

## 2023-09-09 PROCEDURE — 6360000002 HC RX W HCPCS: Performed by: SURGERY

## 2023-09-09 PROCEDURE — 99232 SBSQ HOSP IP/OBS MODERATE 35: CPT | Performed by: INTERNAL MEDICINE

## 2023-09-09 PROCEDURE — 6370000000 HC RX 637 (ALT 250 FOR IP): Performed by: INTERNAL MEDICINE

## 2023-09-09 PROCEDURE — 80202 ASSAY OF VANCOMYCIN: CPT

## 2023-09-09 PROCEDURE — 2700000000 HC OXYGEN THERAPY PER DAY

## 2023-09-09 PROCEDURE — 1200000000 HC SEMI PRIVATE

## 2023-09-09 PROCEDURE — 36415 COLL VENOUS BLD VENIPUNCTURE: CPT

## 2023-09-09 PROCEDURE — 6360000002 HC RX W HCPCS: Performed by: INTERNAL MEDICINE

## 2023-09-09 PROCEDURE — 2580000003 HC RX 258: Performed by: INTERNAL MEDICINE

## 2023-09-09 PROCEDURE — 94640 AIRWAY INHALATION TREATMENT: CPT

## 2023-09-09 PROCEDURE — 6370000000 HC RX 637 (ALT 250 FOR IP): Performed by: SURGERY

## 2023-09-09 PROCEDURE — 94761 N-INVAS EAR/PLS OXIMETRY MLT: CPT

## 2023-09-09 PROCEDURE — 85027 COMPLETE CBC AUTOMATED: CPT

## 2023-09-09 PROCEDURE — 2580000003 HC RX 258: Performed by: SURGERY

## 2023-09-09 PROCEDURE — 2580000003 HC RX 258: Performed by: STUDENT IN AN ORGANIZED HEALTH CARE EDUCATION/TRAINING PROGRAM

## 2023-09-09 PROCEDURE — 6360000002 HC RX W HCPCS: Performed by: STUDENT IN AN ORGANIZED HEALTH CARE EDUCATION/TRAINING PROGRAM

## 2023-09-09 PROCEDURE — 80048 BASIC METABOLIC PNL TOTAL CA: CPT

## 2023-09-09 RX ORDER — DOXYCYCLINE HYCLATE 100 MG
100 TABLET ORAL 2 TIMES DAILY
Qty: 10 TABLET | Refills: 0 | Status: CANCELLED | OUTPATIENT
Start: 2023-09-09 | End: 2023-09-14

## 2023-09-09 RX ORDER — IPRATROPIUM BROMIDE AND ALBUTEROL SULFATE 2.5; .5 MG/3ML; MG/3ML
1 SOLUTION RESPIRATORY (INHALATION) 2 TIMES DAILY
Status: DISCONTINUED | OUTPATIENT
Start: 2023-09-09 | End: 2023-09-10 | Stop reason: HOSPADM

## 2023-09-09 RX ADMIN — INSULIN GLARGINE 18 UNITS: 100 INJECTION, SOLUTION SUBCUTANEOUS at 20:45

## 2023-09-09 RX ADMIN — CEFEPIME 2000 MG: 2 INJECTION, POWDER, FOR SOLUTION INTRAVENOUS at 20:44

## 2023-09-09 RX ADMIN — IPRATROPIUM BROMIDE AND ALBUTEROL SULFATE 1 DOSE: 2.5; .5 SOLUTION RESPIRATORY (INHALATION) at 21:17

## 2023-09-09 RX ADMIN — DULOXETINE HYDROCHLORIDE 60 MG: 60 CAPSULE, DELAYED RELEASE ORAL at 09:00

## 2023-09-09 RX ADMIN — CEFEPIME 2000 MG: 2 INJECTION, POWDER, FOR SOLUTION INTRAVENOUS at 13:16

## 2023-09-09 RX ADMIN — OXYCODONE HYDROCHLORIDE 10 MG: 5 TABLET ORAL at 13:16

## 2023-09-09 RX ADMIN — OXYCODONE HYDROCHLORIDE 10 MG: 5 TABLET ORAL at 09:00

## 2023-09-09 RX ADMIN — GUAIFENESIN 600 MG: 600 TABLET, EXTENDED RELEASE ORAL at 20:44

## 2023-09-09 RX ADMIN — VANCOMYCIN HYDROCHLORIDE 1250 MG: 10 INJECTION, POWDER, LYOPHILIZED, FOR SOLUTION INTRAVENOUS at 22:22

## 2023-09-09 RX ADMIN — CEFEPIME 2000 MG: 2 INJECTION, POWDER, FOR SOLUTION INTRAVENOUS at 05:53

## 2023-09-09 RX ADMIN — GABAPENTIN 600 MG: 300 CAPSULE ORAL at 20:45

## 2023-09-09 RX ADMIN — DEXAMETHASONE 4 MG: 4 TABLET ORAL at 20:45

## 2023-09-09 RX ADMIN — GABAPENTIN 600 MG: 300 CAPSULE ORAL at 13:16

## 2023-09-09 RX ADMIN — BUDESONIDE INHALATION 1000 MCG: 0.5 SUSPENSION RESPIRATORY (INHALATION) at 21:17

## 2023-09-09 RX ADMIN — Medication 100 MG: at 09:03

## 2023-09-09 RX ADMIN — PANTOPRAZOLE SODIUM 40 MG: 40 TABLET, DELAYED RELEASE ORAL at 05:58

## 2023-09-09 RX ADMIN — GABAPENTIN 600 MG: 300 CAPSULE ORAL at 08:59

## 2023-09-09 RX ADMIN — GUAIFENESIN 600 MG: 600 TABLET, EXTENDED RELEASE ORAL at 09:00

## 2023-09-09 RX ADMIN — DEXAMETHASONE 4 MG: 4 TABLET ORAL at 05:58

## 2023-09-09 RX ADMIN — ENOXAPARIN SODIUM 30 MG: 100 INJECTION SUBCUTANEOUS at 09:03

## 2023-09-09 RX ADMIN — DEXAMETHASONE 4 MG: 4 TABLET ORAL at 13:17

## 2023-09-09 RX ADMIN — FUROSEMIDE 20 MG: 20 TABLET ORAL at 09:02

## 2023-09-09 RX ADMIN — IPRATROPIUM BROMIDE AND ALBUTEROL SULFATE 1 DOSE: .5; 2.5 SOLUTION RESPIRATORY (INHALATION) at 07:28

## 2023-09-09 RX ADMIN — ATORVASTATIN CALCIUM 40 MG: 40 TABLET, FILM COATED ORAL at 09:02

## 2023-09-09 RX ADMIN — SODIUM CHLORIDE, PRESERVATIVE FREE 10 ML: 5 INJECTION INTRAVENOUS at 09:03

## 2023-09-09 RX ADMIN — BUDESONIDE INHALATION 1000 MCG: 0.5 SUSPENSION RESPIRATORY (INHALATION) at 07:28

## 2023-09-09 RX ADMIN — OXYCODONE HYDROCHLORIDE 10 MG: 5 TABLET ORAL at 17:05

## 2023-09-09 RX ADMIN — METOPROLOL SUCCINATE 50 MG: 50 TABLET, EXTENDED RELEASE ORAL at 09:00

## 2023-09-09 RX ADMIN — ALLOPURINOL 300 MG: 300 TABLET ORAL at 09:00

## 2023-09-09 RX ADMIN — DILTIAZEM HYDROCHLORIDE 180 MG: 180 CAPSULE, COATED, EXTENDED RELEASE ORAL at 08:59

## 2023-09-09 RX ADMIN — ENOXAPARIN SODIUM 30 MG: 100 INJECTION SUBCUTANEOUS at 20:45

## 2023-09-09 RX ADMIN — OXYCODONE HYDROCHLORIDE 10 MG: 5 TABLET ORAL at 20:45

## 2023-09-09 ASSESSMENT — PAIN - FUNCTIONAL ASSESSMENT
PAIN_FUNCTIONAL_ASSESSMENT: ACTIVITIES ARE NOT PREVENTED
PAIN_FUNCTIONAL_ASSESSMENT: ACTIVITIES ARE NOT PREVENTED

## 2023-09-09 ASSESSMENT — PAIN SCALES - GENERAL
PAINLEVEL_OUTOF10: 1
PAINLEVEL_OUTOF10: 3
PAINLEVEL_OUTOF10: 3
PAINLEVEL_OUTOF10: 1
PAINLEVEL_OUTOF10: 3

## 2023-09-09 ASSESSMENT — PAIN DESCRIPTION - ORIENTATION
ORIENTATION: RIGHT;LEFT;LOWER

## 2023-09-09 NOTE — PLAN OF CARE
Problem: Discharge Planning  Goal: Discharge to home or other facility with appropriate resources  9/9/2023 0927 by Mago Conrad RN  Outcome: Progressing  9/9/2023 0447 by Alfonso Almaraz RN  Outcome: Progressing     Problem: Chronic Conditions and Co-morbidities  Goal: Patient's chronic conditions and co-morbidity symptoms are monitored and maintained or improved  9/9/2023 0927 by Mago Conrad RN  Outcome: Progressing  9/9/2023 0447 by Alfonso Almaraz RN  Outcome: Progressing     Problem: Respiratory - Adult  Goal: Achieves optimal ventilation and oxygenation  9/9/2023 0927 by Mago Conrad RN  Outcome: Progressing  9/9/2023 0447 by Alfonso Almaraz RN  Outcome: Progressing     Problem: Safety - Adult  Goal: Free from fall injury  9/9/2023 0927 by Mago Conrad RN  Outcome: Progressing  9/9/2023 0447 by Alfonso Almaraz RN  Outcome: Progressing     Problem: ABCDS Injury Assessment  Goal: Absence of physical injury  9/9/2023 0927 by Mago Conrad RN  Outcome: Progressing  9/9/2023 0447 by Alfonso Almaraz RN  Outcome: Progressing     Problem: Pain  Goal: Verbalizes/displays adequate comfort level or baseline comfort level  9/9/2023 0927 by Mago Conrad RN  Outcome: Progressing  9/9/2023 0447 by Alfonso Almaraz RN  Outcome: Progressing

## 2023-09-10 ENCOUNTER — APPOINTMENT (OUTPATIENT)
Dept: GENERAL RADIOLOGY | Age: 68
DRG: 177 | End: 2023-09-10
Payer: MEDICARE

## 2023-09-10 VITALS
TEMPERATURE: 97.8 F | RESPIRATION RATE: 16 BRPM | DIASTOLIC BLOOD PRESSURE: 72 MMHG | BODY MASS INDEX: 31.11 KG/M2 | WEIGHT: 198.2 LBS | SYSTOLIC BLOOD PRESSURE: 135 MMHG | OXYGEN SATURATION: 92 % | HEART RATE: 88 BPM | HEIGHT: 67 IN

## 2023-09-10 LAB
GLUCOSE BLD-MCNC: 149 MG/DL (ref 70–99)
GLUCOSE BLD-MCNC: 206 MG/DL (ref 70–99)
PERFORMED ON: ABNORMAL
PERFORMED ON: ABNORMAL

## 2023-09-10 PROCEDURE — 94761 N-INVAS EAR/PLS OXIMETRY MLT: CPT

## 2023-09-10 PROCEDURE — 2700000000 HC OXYGEN THERAPY PER DAY

## 2023-09-10 PROCEDURE — 6360000002 HC RX W HCPCS: Performed by: SURGERY

## 2023-09-10 PROCEDURE — 99239 HOSP IP/OBS DSCHRG MGMT >30: CPT | Performed by: INTERNAL MEDICINE

## 2023-09-10 PROCEDURE — 6370000000 HC RX 637 (ALT 250 FOR IP): Performed by: INTERNAL MEDICINE

## 2023-09-10 PROCEDURE — 6360000002 HC RX W HCPCS: Performed by: INTERNAL MEDICINE

## 2023-09-10 PROCEDURE — 2580000003 HC RX 258: Performed by: STUDENT IN AN ORGANIZED HEALTH CARE EDUCATION/TRAINING PROGRAM

## 2023-09-10 PROCEDURE — 6370000000 HC RX 637 (ALT 250 FOR IP): Performed by: SURGERY

## 2023-09-10 PROCEDURE — 94640 AIRWAY INHALATION TREATMENT: CPT

## 2023-09-10 PROCEDURE — 71045 X-RAY EXAM CHEST 1 VIEW: CPT

## 2023-09-10 PROCEDURE — 6360000002 HC RX W HCPCS: Performed by: STUDENT IN AN ORGANIZED HEALTH CARE EDUCATION/TRAINING PROGRAM

## 2023-09-10 RX ORDER — LINEZOLID 600 MG/1
600 TABLET, FILM COATED ORAL 2 TIMES DAILY
Qty: 10 TABLET | Refills: 0 | Status: SHIPPED | OUTPATIENT
Start: 2023-09-10 | End: 2023-09-15

## 2023-09-10 RX ORDER — DEXAMETHASONE 6 MG/1
6 TABLET ORAL
Qty: 5 TABLET | Refills: 0 | Status: SHIPPED | OUTPATIENT
Start: 2023-09-10 | End: 2023-09-15

## 2023-09-10 RX ORDER — LEVOFLOXACIN 500 MG/1
500 TABLET, FILM COATED ORAL DAILY
Qty: 5 TABLET | Refills: 0 | Status: SHIPPED | OUTPATIENT
Start: 2023-09-10 | End: 2023-09-15

## 2023-09-10 RX ORDER — METOPROLOL SUCCINATE 50 MG/1
50 TABLET, EXTENDED RELEASE ORAL DAILY
Qty: 30 TABLET | Refills: 0
Start: 2023-09-10 | End: 2023-09-14

## 2023-09-10 RX ADMIN — GABAPENTIN 600 MG: 300 CAPSULE ORAL at 09:17

## 2023-09-10 RX ADMIN — DULOXETINE HYDROCHLORIDE 60 MG: 60 CAPSULE, DELAYED RELEASE ORAL at 09:17

## 2023-09-10 RX ADMIN — DEXAMETHASONE 4 MG: 4 TABLET ORAL at 04:58

## 2023-09-10 RX ADMIN — FUROSEMIDE 20 MG: 20 TABLET ORAL at 09:16

## 2023-09-10 RX ADMIN — ALLOPURINOL 300 MG: 300 TABLET ORAL at 09:18

## 2023-09-10 RX ADMIN — GUAIFENESIN 600 MG: 600 TABLET, EXTENDED RELEASE ORAL at 09:17

## 2023-09-10 RX ADMIN — ENOXAPARIN SODIUM 30 MG: 100 INJECTION SUBCUTANEOUS at 09:16

## 2023-09-10 RX ADMIN — ATORVASTATIN CALCIUM 40 MG: 40 TABLET, FILM COATED ORAL at 09:17

## 2023-09-10 RX ADMIN — METOPROLOL SUCCINATE 50 MG: 50 TABLET, EXTENDED RELEASE ORAL at 09:17

## 2023-09-10 RX ADMIN — Medication 100 MG: at 09:16

## 2023-09-10 RX ADMIN — DILTIAZEM HYDROCHLORIDE 180 MG: 180 CAPSULE, COATED, EXTENDED RELEASE ORAL at 09:17

## 2023-09-10 RX ADMIN — BUDESONIDE INHALATION 1000 MCG: 0.5 SUSPENSION RESPIRATORY (INHALATION) at 07:13

## 2023-09-10 RX ADMIN — CEFEPIME 2000 MG: 2 INJECTION, POWDER, FOR SOLUTION INTRAVENOUS at 04:57

## 2023-09-10 RX ADMIN — PANTOPRAZOLE SODIUM 40 MG: 40 TABLET, DELAYED RELEASE ORAL at 05:00

## 2023-09-10 RX ADMIN — IPRATROPIUM BROMIDE AND ALBUTEROL SULFATE 1 DOSE: 2.5; .5 SOLUTION RESPIRATORY (INHALATION) at 07:14

## 2023-09-10 RX ADMIN — OXYCODONE HYDROCHLORIDE 10 MG: 5 TABLET ORAL at 09:16

## 2023-09-10 ASSESSMENT — PAIN SCALES - GENERAL: PAINLEVEL_OUTOF10: 5

## 2023-09-10 ASSESSMENT — PAIN DESCRIPTION - LOCATION: LOCATION: KNEE;LEG

## 2023-09-10 ASSESSMENT — PAIN DESCRIPTION - ORIENTATION: ORIENTATION: RIGHT;LEFT

## 2023-09-10 NOTE — DISCHARGE SUMMARY
Name:  Renae Darden  Room:  /3987-62  MRN:    [id number]    IM Discharge Summary    Discharging Physician:  Oneyda Haynes MD    Admit: 9/5/2023    Discharge:      PCP      Diamond Avendaño MD    Diagnoses and hospital course  this Admission             COVID PNA   COPD exacerbation   Acute on chronic hypoxia, baseline requirement 2 liters   - started on steroids, HHN,    - duonebs q4wa  - initially given ceftriaxone/azithro for 3 days but Pneumonia molecular panel with heavy growth of pseudomonas, MRSA, strep pneumoniae  - unsure if has infection vs colonization   - currently being tx for both MRSA and pseudomonas pna - added cefepime and IV vanc  - pulm consulted, follows with Dr. Melody Conti  -improving symptoms   - can change to oral zyvox  and levaquin at dc        DM type 2 with Neuropathy   -on high dose gabapentin - monitor mental status  -resumed lantus and adjusted according to sugars given steroid use    -SSI , diabetic diet     Hx of alcoholic liver cirrhosis    -does not appear to have any acute decompensation   -reportedly has not had alcohol recently   -LFTs stable   -follow up with GI outpatient   - resumed home lasix        HTN   -on toprol XL, cardizem, lasix - slightly high in house, need outpt monitoring     HLD   -on statin      Gout   -on allopurinol      PAD s.p aortofemoral bypass  -on statin   -had normal arterial duplexes in 2020  -follow up with vascular      DVT Prophylaxis: Lovenox    full code       HPI   Afia Notch is a 78 y.o. male who presents with SOB, cough, abd pain, chest paint that began a week ago. Has been getting worse. Home COVID tested positive. Cough productive of yellow mucus. Supposed to be using 2 liters o2 24/7 but hasn't been. Endorsing nausea, Patient denies fever/chills/v/d/dysuria. Patient states he has uncontrollable shaking episodes. Physical Exam at Discharge:      General:  elderly obese male up in bed  Awake, alert and oriented.  Appears to be

## 2023-09-10 NOTE — PLAN OF CARE
Problem: Discharge Planning  Goal: Discharge to home or other facility with appropriate resources  9/9/2023 2248 by Pili Carreon RN  Outcome: Progressing  9/9/2023 0927 by Gautam Valladares RN  Outcome: Progressing     Problem: Chronic Conditions and Co-morbidities  Goal: Patient's chronic conditions and co-morbidity symptoms are monitored and maintained or improved  9/9/2023 2248 by Pili Carreon RN  Outcome: Progressing  9/9/2023 0927 by Gautam Valladares RN  Outcome: Progressing     Problem: Respiratory - Adult  Goal: Achieves optimal ventilation and oxygenation  9/9/2023 2248 by Pili Carreon RN  Outcome: Progressing  9/9/2023 0927 by Gautam Valladares RN  Outcome: Progressing     Problem: Safety - Adult  Goal: Free from fall injury  9/9/2023 2248 by Pili Carreon RN  Outcome: Progressing  9/9/2023 0927 by Gautam Valladares RN  Outcome: Progressing     Problem: ABCDS Injury Assessment  Goal: Absence of physical injury  9/9/2023 2248 by Pili Carreon RN  Outcome: Progressing  9/9/2023 0927 by Gautam Valladares RN  Outcome: Progressing     Problem: Pain  Goal: Verbalizes/displays adequate comfort level or baseline comfort level  9/9/2023 2248 by Pili Carreon RN  Outcome: Progressing  9/9/2023 0927 by Gautam Valladares RN  Outcome: Progressing     Problem: Gastrointestinal - Adult  Goal: Maintains adequate nutritional intake  Outcome: Progressing     Problem: Genitourinary - Adult  Goal: Absence of urinary retention  Outcome: Progressing     Problem: Infection - Adult  Goal: Absence of infection at discharge  Outcome: Progressing

## 2023-09-10 NOTE — PLAN OF CARE
Problem: Discharge Planning  Goal: Discharge to home or other facility with appropriate resources  9/10/2023 0746 by Omar Nicole RN  Outcome: Progressing  9/9/2023 2248 by Ahmet Patel RN  Outcome: Progressing     Problem: Chronic Conditions and Co-morbidities  Goal: Patient's chronic conditions and co-morbidity symptoms are monitored and maintained or improved  9/10/2023 0746 by Omar Nicole RN  Outcome: Progressing  9/9/2023 2248 by Ahmet Patel RN  Outcome: Progressing     Problem: Respiratory - Adult  Goal: Achieves optimal ventilation and oxygenation  9/10/2023 0746 by Omar Nicole RN  Outcome: Progressing  9/9/2023 2248 by Ahmet Patel RN  Outcome: Progressing     Problem: Gastrointestinal - Adult  Goal: Maintains adequate nutritional intake  9/10/2023 0746 by Omar Nicole RN  Outcome: Progressing  9/9/2023 2248 by Ahmet Patel RN  Outcome: Progressing     Problem: Genitourinary - Adult  Goal: Absence of urinary retention  9/10/2023 0746 by Omar Nicole RN  Outcome: Progressing  9/9/2023 2248 by Ahmet Patel RN  Outcome: Progressing     Problem: Infection - Adult  Goal: Absence of infection at discharge  9/10/2023 0746 by Omar Nicole RN  Outcome: Progressing  9/9/2023 2248 by Ahmet Patel RN  Outcome: Progressing     Problem: Safety - Adult  Goal: Free from fall injury  9/10/2023 0746 by Omar Nicole RN  Outcome: Progressing  9/9/2023 2248 by Ahmet Patel RN  Outcome: Progressing     Problem: ABCDS Injury Assessment  Goal: Absence of physical injury  9/10/2023 0746 by Omar Nicole RN  Outcome: Progressing  9/9/2023 2248 by Ahmet Patel RN  Outcome: Progressing     Problem: Pain  Goal: Verbalizes/displays adequate comfort level or baseline comfort level  9/10/2023 0746 by Omar Nicole RN  Outcome: Progressing  9/9/2023 2248 by Ahmet Patel RN  Outcome: Progressing

## 2023-09-10 NOTE — CARE COORDINATION
DISCHARGE ORDER  Date/Time 9/10/2023 10:42 AM  Completed by: Akin Kruger RN, Case Management    Patient Name: Nadia Shields      : 1955  Admitting Diagnosis: Dyspnea, unspecified type [R06.00]  COVID [U07.1]      Admit order Date and Status:23  (verify MD's last order for status of admission)      Noted discharge order. If applicable PT/OT recommendation at Discharge: NA  DME recommendation by PT/OT:  Confirmed discharge plan: with the patient. If pt confirmed DC plan does family need to be contacted by CM No if yes who______  Discharge Plan: The patient is discharging to home. The patient has home 02 in place and will request family member bring 02 tank to discharge. Date of Last IMM Given: 9/10/23    Reviewed chart. Role of discharge planner explained and patient verbalized understanding. Discharge order is noted. Has Home O2 in place on admit:  Yes    [facility] RN informed patient of the need to bring home 02 to discharge. Informed of need to bring portable home O2 tank on day of discharge for nursing to connect prior to leaving:   Yes  Verbalized agreement/Understanding:   Yes  Pt is being d/c'd to today today. Pt's O2 sats are 97% on 3. Discharge timeout done with [facility] RN. All discharge needs and concerns addressed.
INTERDISCIPLINARY PLAN OF CARE CONFERENCE    Date/Time: 9/8/2023 10:50 AM  Completed by: Claudia Gifford RN, Case Management      Patient Name:  Suzi Castillo  YOB: 1955  Admitting Diagnosis: Dyspnea, unspecified type [R06.00]  COVID [U07.1]     Admit Date/Time:  9/5/2023  1:16 AM    Chart reviewed. Interdisciplinary team contacted or reviewed plan related to patient progress and discharge plans. Disciplines included Case Management, Nursing, and Dietitian. Current Status:inpt  PT/OT recommendation for discharge plan of care: tbd    Expected D/C Disposition:  awaiting PT/OT    Discharge Plan Comments: Reviewed chart. Tentative plan is home with family. Pt active with [facility] for home O2. Awaiting PT recs when appropriate. Following. Home O2 in place on admit: Yes  Pt informed of need to bring portable home O2 tank on day of discharge for nursing to connect prior to leaving:  Yes  Verbalized agreement/Understanding:   Yes
Representative Name:       The Patient and/or Patient Representative Agree with the Discharge Plan?       Coni Tran RN  Case Management Department  Ph: [phone]

## 2023-09-11 ENCOUNTER — TELEPHONE (OUTPATIENT)
Dept: PULMONOLOGY | Age: 68
End: 2023-09-11

## 2023-09-11 ENCOUNTER — CARE COORDINATION (OUTPATIENT)
Dept: CASE MANAGEMENT | Age: 68
End: 2023-09-11

## 2023-09-11 ENCOUNTER — TELEPHONE (OUTPATIENT)
Dept: FAMILY MEDICINE CLINIC | Age: 68
End: 2023-09-11

## 2023-09-11 DIAGNOSIS — U07.1 COVID: Primary | ICD-10-CM

## 2023-09-11 DIAGNOSIS — J18.9 PNEUMONIA DUE TO INFECTIOUS ORGANISM, UNSPECIFIED LATERALITY, UNSPECIFIED PART OF LUNG: Primary | ICD-10-CM

## 2023-09-11 PROCEDURE — 1111F DSCHRG MED/CURRENT MED MERGE: CPT | Performed by: FAMILY MEDICINE

## 2023-09-11 NOTE — TELEPHONE ENCOUNTER
[address] [facility],[address] Floor Transitions Initial Follow Up Call    Outreach made within 2 business days of discharge: Yes    Patient: Kay Be Patient : 1955   MRN: [id number]  Reason for Admission: There are no discharge diagnoses documented for the most recent discharge. Discharge Date: 9/10/23       Spoke with: Patient    Discharge department/facility: [facility]    Non-face-to-face services provided:  Scheduled appointment with PCP-   Obtained and reviewed discharge summary and/or continuity of care documents    TCM Interactive Patient Contact:  Was patient able to fill all prescriptions: Yes  Was patient instructed to bring all medications to the follow-up visit: Yes  Is patient taking all medications as directed in the discharge summary?  Yes  Does patient understand their discharge instructions: Yes  Does patient have questions or concerns that need addressed prior to 7-14 day follow up office visit: no    Scheduled appointment with PCP within 7-14 days    Follow Up  Future Appointments   Date Time Provider [address]   2023  2:20 PM Miriam Melendez MD [facility] [place] - [place]   1/15/2024 11:20 AM Miriam Melendez MD [address] Hutson RN

## 2023-09-11 NOTE — CARE COORDINATION
appointment?: Yes  Patient Home Equipment: Nebulizer  Do you have support at home?: Child  Do you feel like you have everything you need to keep you well at home?: Yes  Are you an active caregiver in your home?: No  Care Transitions Interventions  No Identified Needs                                 Discussed follow-up appointments. If no appointment was previously scheduled, appointment scheduling offered: Yes. Is follow up appointment scheduled within 7 days of discharge? Yes. Follow Up  Future Appointments   Date Time Provider [address] [place] [address]   9/13/2023  2:20 PM Rosa Morgan MD [facility]   1/15/2024 11:20 AM Rosa Morgan MD [address] [id number], [facility] Transition Nurse provided contact information. Plan for follow-up call in 5-7 days based on severity of symptoms and risk factors.     Pan Zhang RN  Care Transition Nurse  [phone] mobile

## 2023-09-13 ENCOUNTER — TELEMEDICINE (OUTPATIENT)
Dept: FAMILY MEDICINE CLINIC | Age: 68
End: 2023-09-13
Payer: MEDICARE

## 2023-09-13 DIAGNOSIS — J18.9 PNEUMONIA OF BOTH LUNGS DUE TO INFECTIOUS ORGANISM, UNSPECIFIED PART OF LUNG: ICD-10-CM

## 2023-09-13 DIAGNOSIS — J41.8 MIXED SIMPLE AND MUCOPURULENT CHRONIC BRONCHITIS (HCC): ICD-10-CM

## 2023-09-13 DIAGNOSIS — U07.1 COVID: Primary | ICD-10-CM

## 2023-09-13 PROCEDURE — 99442 PR PHYS/QHP TELEPHONE EVALUATION 11-20 MIN: CPT | Performed by: FAMILY MEDICINE

## 2023-09-13 NOTE — PROGRESS NOTES
Documentation:  I communicated with the patient and/or health care decision maker about see below. Details of this discussion including any medical advice provided: See below    Total Time: minutes: 5-10 minutes    Kay Be was evaluated through a synchronous (real-time) audio encounter. Patient identification was verified at the start of the visit. He (or guardian if applicable) is aware that this is a billable service, which includes applicable co-pays. This visit was conducted with the patient's (and/or legal guardian's) verbal consent. He has not had a related appointment within my department in the past 7 days or scheduled within the next 24 hours. The patient was located at Home: [address] [place] [address]. The provider was located at [facility] (Appt Dept): Russell CALVILLO,  [address] [facility]. Note: not billable if this call serves to triage the patient into an appointment for the relevant concern    Kay Be is a 78 y.o. male evaluated via telephone on 9/13/2023 for Follow-Up from Hospital (9/5-9/10 [facility] +covid, pneumonia)  . Miriam Melendez MD       Kay Be is a 78 y.o. male evaluated via telephone on 9/13/2023. Hospital follow up 9/5-9/10 [facility] covid pneumonia. States he is currently on 4 liters ox and his O2 has been staying between 90 and 100%. States he tested himself today and is still testing positive. He missed his appt with pain management so right now he is not taking his gabapentin or oxycodone. He has an appt scheduled with them on 9/20/23.      Internal Administration   First Dose      Second Dose           Last COVID Lab SARS-CoV-2, PCR (no units)   Date Value   10/26/2021 Not Detected     SARS-CoV-2 RNA, RT PCR (no units)   Date Value   09/05/2023 DETECTED (A)     SARS-CoV-2, ZAHIDA (no units)   Date Value   08/19/2020 NOT DETECTED     SARS-CoV-2, NAAT (no units)   Date Value   03/27/2023 Not Detected     POC
0. 8     POC Glucose (mg/dl)   Date Value   09/10/2023 149 (H)     POC Hematocrit (%)   Date Value   05/05/2020 43.0     POC Potassium (mmol/L)   Date Value   05/05/2020 4.3     Sample Type (no units)   Date Value   05/05/2020 CRISSY     POC Sodium (mmol/L)   Date Value   05/05/2020 140            Wt Readings from Last 3 Encounters:   09/10/23 198 lb 3.2 oz (89.9 kg)   09/04/23 205 lb (93 kg)   08/15/23 203 lb (92.1 kg)     BP Readings from Last 3 Encounters:   09/10/23 135/72   09/04/23 114/64   08/15/23 116/72     Lab Results   Component Value Date    LABA1C 6.0 07/12/2023    LABA1C 6.0 03/27/2023    LABA1C 5.5 02/28/2023       [] Patient has completed an advance directive  [] Patient has NOT completed an advanced directive  [] Patient has a documented healthcare surrogate  [] Patient does NOT have a documented healthcare surrogate  [] Discussed the importance of establishing and updating an advanced directive. Patient has questions at this time and those were answered. [] Discussed the importance of establishing and updating an advanced directive. Patient does NOT have questions at this time. Discussed with: [] Patient            [] Family             [] Other caregiver      All other ROS negative    Prior to Visit Medications    Medication Sig Taking?  Authorizing Provider   metoprolol succinate (TOPROL XL) 50 MG extended release tablet Take 1 tablet by mouth daily Yes Tiana Morgan MD   levoFLOXacin (LEVAQUIN) 500 MG tablet Take 1 tablet by mouth daily for 5 days Yes Tiana Morgan MD   dexamethasone (DECADRON) 6 MG tablet Take 1 tablet by mouth daily (with breakfast) for 5 days Yes Tiana Morgan MD   linezolid (ZYVOX) 600 MG tablet Take 1 tablet by mouth 2 times daily for 5 days Yes Tiana Morgan MD   atorvastatin (LIPITOR) 40 MG tablet TAKE ONE (1) TABLET BY MOUTH DAILY Yes Jessica Andre MD   pantoprazole (PROTONIX) 40 MG tablet TAKE ONE TABLET BY MOUTH EVERY MORNING

## 2023-09-13 NOTE — TELEPHONE ENCOUNTER
MD Tahir Hernandez MA; Helen Diaz MA  Follow up cxr in 3 and to see in office     Assessment: 9/9/23      -COVID pneumonia  -possible secondary pneumonia  -Acute COPD exacerbation secondary to covid   -COPD with FEV 49 ,calcified as severe   -chronic hypoxia       Plan: Add mucinex   Decadron PO  6 mg daily X 10 days total  *-IV abx ,cefepime as grow pseudomonas and vancomycin for strep and MRSA  Can be on Levaquin and zyvox,send sputum culture and follow sens as OP.had 3 days cefepime and vancomycin  *- pneumonia molecular /sputm culture reviewed . as above   + COVID  viral panel   *- Sputum culture resnt ,follow as OP   *_If in distress ,will use CPAP/BiPAP  *- procalcitonin  *-BD  ICS   Incentive spirmetery and flutter valve   CT chest :reviewed ,no pE will repeat in 2-3 months   Check o2 at ambulation before discharge  May needed NIMV   Will repeat images as OP  If not betetr consider bronch  CXR I am 9/10 and follow in office in 2-3 weeks         Thank you very much for allowing me to participate in the care of this pleasant patient , should you have any questions ,please do not hesitate to contact me        Barry [facility] CARDIOVASCULAR DE ID Y CARIBE DR JOSEPH WILLIAM  Pulmonary&Critical Care Medicine   Professor Pedro Jennings
Spoke with pt, scheduled appt for 10/5/23. Mailed appt info to pt as well.
DISCHARGE

## 2023-09-14 DIAGNOSIS — I10 BENIGN ESSENTIAL HTN: ICD-10-CM

## 2023-09-14 DIAGNOSIS — I10 UNCONTROLLED HYPERTENSION: ICD-10-CM

## 2023-09-14 RX ORDER — METOPROLOL SUCCINATE 50 MG/1
50 TABLET, EXTENDED RELEASE ORAL DAILY
Qty: 30 TABLET | Refills: 2 | Status: SHIPPED | OUTPATIENT
Start: 2023-09-14

## 2023-09-14 NOTE — TELEPHONE ENCOUNTER
Refill Request     CONFIRM preferrred pharmacy with the patient. If Mail Order Rx - Pend for 90 day refill. Last Seen: Last Seen Department: 9/13/2023  Last Seen by PCP: Visit date not found    Last Written: 9/10/2023 (Rx filled by hospital)     If no future appointment scheduled, route STAFF MESSAGE with patient name to the [facility] for scheduling. Next Appointment:   Future Appointments   Date Time Provider [facility] [address]   10/5/2023 11:30 AM Jacquie Estevez MD [facility]   1/15/2024 11:20 AM Carloz Mustafa MD [facility] [place] [facility]       Message sent to [employer] to schedule appt with patient?   N/A      Requested Prescriptions     Pending Prescriptions Disp Refills    metoprolol succinate (TOPROL XL) 50 MG extended release tablet [Pharmacy Med Name: METOPROLOL SUCCINATE ER 50MG ER TABLET ER 24HR] 30 tablet 2     Sig: TAKE ONE TABLET BY MOUTH EVERY DAY

## 2023-09-18 ENCOUNTER — CARE COORDINATION (OUTPATIENT)
Dept: CARE COORDINATION | Age: 68
End: 2023-09-18

## 2023-09-18 NOTE — CARE COORDINATION
[facility] Care [facility] Follow Up Call    Patient Current Location:  Home: [address] [place] [address] [address] [place] [address] contacted the patient by telephone to follow up after admission on -9/10. Verified name and  with patient as identifiers. Patient: Jaiden Lew  Patient : 1955   MRN: [id number]  Reason for Admission: 5 days -> COVID PNA, COVID+ 2023, COPD exac, DM2 with neuropathy, hx alcoholic liver cirrhosis, HTN, HLD, gout, PAD s/p aortofemoral bypass -> home no services, baseline O2 at 3L ([facility])  Discharge Date: 9/10/23 RARS: Readmission Risk Score: 13.5      Needs to be reviewed by the provider   Additional needs identified to be addressed with provider: No  none             Method of communication with provider: none. LPN CC spoke with patient. States he is a lot better. He reports a sl cough that is nonproductive. SOB is at baseline. /83, SPO2 95-97%, glucose 156. Patient does report abd pain/cramps, states it is mostly to bilateral sides. He does have a lot of lower back pain that, at times, radiates to BLE. Denies CP, palpitations, HA, dizziness, brain fog. Patient has secured a POC at [facility]. States it costs $400, goes up to 7 lpm. They will be getting it next week. Ambulating well. Denies medication changes. Atbx and steroids completed. Denies needs. Marlen Stringer LPN CC  Care Transitions  [phone]    Addressed changes since last contact:  none  Discussed follow-up appointments. If no appointment was previously scheduled, appointment scheduling offered: Yes. Is follow up appointment scheduled within 7 days of discharge? Yes.     Follow Up  Future Appointments   Date Time Provider [address]   10/5/2023 11:30 AM Raiza Yap MD [facility]   1/15/2024 11:20 AM Avery Chase MD [facility] [place] [facility]     Non-[facility] follow up appointment(s): SAL    LPN Care Coordinator reviewed medical action plan and red flags with patient

## 2023-09-21 RX ORDER — PANTOPRAZOLE SODIUM 40 MG/1
TABLET, DELAYED RELEASE ORAL
Qty: 30 TABLET | Refills: 3 | Status: SHIPPED | OUTPATIENT
Start: 2023-09-21

## 2023-09-21 NOTE — TELEPHONE ENCOUNTER
Refill Request     CONFIRM preferrred pharmacy with the patient. If Mail Order Rx - Pend for 90 day refill. Last Seen: Last Seen Department: 9/13/2023  Last Seen by PCP: 9/13/2023    Last Written: 8/7/2023    If no future appointment scheduled, route STAFF MESSAGE with patient name to the [facility] for scheduling. Next Appointment:   Future Appointments   Date Time Provider [address] [place] [address]   10/5/2023 11:30 AM Ova Canavan, MD [facility]   1/15/2024 11:20 AM [facility] Ten[facility] [place] [facility]       Message sent to [address] [place] [address] to schedule appt with patient?   NO      Requested Prescriptions     Pending Prescriptions Disp Refills    pantoprazole (PROTONIX) 40 MG tablet [Pharmacy Med Name: PANTOPRAZOLE SODIUM 40MG TABLET DR] 30 tablet 3     Sig: TAKE ONE TABLET BY MOUTH EVERY MORNING BEFORE BREAKFAST

## 2023-09-25 ENCOUNTER — CARE COORDINATION (OUTPATIENT)
Dept: CASE MANAGEMENT | Age: 68
End: 2023-09-25

## 2023-09-25 NOTE — CARE COORDINATION
[facility] Care Transitions Follow Up Call    Patient Current Location: Home: [address] [place] [address] [address]    Care Transition Nurse contacted the patient by telephone to follow up after recent hospital admission. Verified name and  with patient as identifiers. Patient: Kymberly Lester  Patient : 1955   MRN: [id number]  Reason for Admission: 5 days -> COVID PNA, COVID+ 2023, COPD exac, DM2 with neuropathy, hx alcoholic liver cirrhosis, HTN, HLD, gout, PAD s/p aortofemoral bypass -> home no services, baseline O2 at 3L ([facility])  Discharge Date: 9/10/23 RARS: Readmission Risk Score: 13.5      Needs to be reviewed by the provider   Additional needs identified to be addressed with provider: Yes  Patient c/o sores on his bottom lip for past 4-5 days that are hard and painful. He has been dabbing on mouthwash to the area because it seems to numb it. Any recommendations? Preferred pharmacy is [address] [place] [address]. Method of communication with provider: chart routing. Feeling pretty good but not yet baseline. Wearing O2 at all times now - was only HS prior to his COVID dx. States his only complaint is sores on his botton lip he says may be fever blister. Denies sores in mouth or on tongue. States the sores have been present for 4-5 days, they are hard to the touch. Denies fever, chills. He has been applying a mouthwash with a Qtip to his lip and that numbs them. He has not contacted PCP office but states a friend had a similar situation and required prescription. His preferred pharmacy is [address] [facility]. States otherwise he feels good and is able to get around. Ordered POC and hope to get it this week. He will take to his appt with Dr Yordan Truong next week. Addressed changes since last contact:  none  Discussed follow-up appointments. If no appointment was previously scheduled, appointment scheduling offered: completed vv. Is follow up appointment scheduled within 7 days of discharge?

## 2023-09-26 ENCOUNTER — CARE COORDINATION (OUTPATIENT)
Dept: CASE MANAGEMENT | Age: 68
End: 2023-09-26

## 2023-09-26 RX ORDER — CALCIUM CITRATE/VITAMIN D3 200MG-6.25
TABLET ORAL
Qty: 50 EACH | Refills: 0 | Status: SHIPPED | OUTPATIENT
Start: 2023-09-26

## 2023-09-26 NOTE — CARE COORDINATION
CTN Follow Up:     CTN notes no response from provider yet and confirmed with Dee Dee Yeboah RN at office that PCP is out office and requested note be re-routed to Rosalina Colon NP for response which was done by CTN at this time. Awaiting recommendation before follow up with patient. Marquis Gutierres, CONSTANTINO  Care Transition Nurse  [phone] mobile.     Future Appointments   Date Time Provider [address] [place] [address]   10/5/2023 11:30 AM Jose Shirley MD [facility]   1/15/2024 11:20 AM Diamond Avendaño MD [facility]

## 2023-09-27 ENCOUNTER — TELEMEDICINE (OUTPATIENT)
Dept: FAMILY MEDICINE CLINIC | Age: 68
End: 2023-09-27
Payer: MEDICARE

## 2023-09-27 ENCOUNTER — CARE COORDINATION (OUTPATIENT)
Dept: CASE MANAGEMENT | Age: 68
End: 2023-09-27

## 2023-09-27 DIAGNOSIS — L01.00 IMPETIGO: Primary | ICD-10-CM

## 2023-09-27 DIAGNOSIS — B00.9 HSV-1 INFECTION: ICD-10-CM

## 2023-09-27 PROBLEM — R06.00 DYSPNEA: Status: ACTIVE | Noted: 2023-09-27

## 2023-09-27 PROCEDURE — 1124F ACP DISCUSS-NO DSCNMKR DOCD: CPT | Performed by: NURSE PRACTITIONER

## 2023-09-27 PROCEDURE — 1111F DSCHRG MED/CURRENT MED MERGE: CPT | Performed by: NURSE PRACTITIONER

## 2023-09-27 PROCEDURE — G8427 DOCREV CUR MEDS BY ELIG CLIN: HCPCS | Performed by: NURSE PRACTITIONER

## 2023-09-27 PROCEDURE — 3017F COLORECTAL CA SCREEN DOC REV: CPT | Performed by: NURSE PRACTITIONER

## 2023-09-27 PROCEDURE — 99213 OFFICE O/P EST LOW 20 MIN: CPT | Performed by: NURSE PRACTITIONER

## 2023-09-27 RX ORDER — PNV NO.95/FERROUS FUM/FOLIC AC 28MG-0.8MG
TABLET ORAL
Qty: 30 TABLET | Refills: 2 | Status: SHIPPED | OUTPATIENT
Start: 2023-09-27

## 2023-09-27 RX ORDER — VALACYCLOVIR HYDROCHLORIDE 1 G/1
1000 TABLET, FILM COATED ORAL 2 TIMES DAILY
Qty: 14 TABLET | Refills: 0 | Status: SHIPPED | OUTPATIENT
Start: 2023-09-27 | End: 2023-10-04

## 2023-09-27 RX ORDER — DOXYCYCLINE HYCLATE 100 MG
100 TABLET ORAL 2 TIMES DAILY
Qty: 14 TABLET | Refills: 0 | Status: SHIPPED | OUTPATIENT
Start: 2023-09-27 | End: 2023-10-04

## 2023-09-27 ASSESSMENT — ENCOUNTER SYMPTOMS
COLOR CHANGE: 0
RESPIRATORY NEGATIVE: 1

## 2023-09-27 NOTE — TELEPHONE ENCOUNTER
Refill Request     CONFIRM preferrred pharmacy with the patient. If Mail Order Rx - Pend for 90 day refill. Last Seen: Last Seen Department: 9/13/2023  Last Seen by PCP: 9/13/2023    Last Written: 7/24/2023    If no future appointment scheduled, route STAFF MESSAGE with patient name to the [facility] for scheduling. Next Appointment:   Future Appointments   Date Time Provider [address] [place] [address]   9/27/2023  3:00 PM NAYELY Del Rosario CNP [facility]  [place] - [place]   10/5/2023 11:30 AM Garry Ferguson MD [facility] MMA   1/15/2024 11:20 AM Rach Hoover MD [facility] [place] - [place]       Message sent to [address] [place] [address] to schedule appt with patient?   NO      Requested Prescriptions     Pending Prescriptions Disp Refills    Ferrous Sulfate (IRON) 325 (65 Fe) MG TABS [Pharmacy Med Name: IRON 325MG TABLET] 30 tablet 2     Sig: TAKE ONE (1) TABLET BY MOUTH DAILY WITH BREAKFAST

## 2023-09-27 NOTE — CARE COORDINATION
[facility] Care Transitions Follow Up Call    Patient Current Location: Home: [address] [place] [address] [address] [facility] [place]    Care Transition Nurse contacted the patient by telephone to follow up after recent hospital admission. Patient: Oanh Mercado  Patient : 1955   MRN: [id number]  Reason for Admission: 5 days -> COVID PNA, COVID+ 2023, COPD exac, DM2 with neuropathy, hx alcoholic liver cirrhosis, HTN, HLD, gout, PAD s/p aortofemoral bypass -> home no services, baseline O2 at 3L (Leading Resp)  Discharge Date: 9/10/23 RARS: Readmission Risk Score: 13.5      Needs to be reviewed by the provider   Additional needs identified to be addressed with provider: No         Method of communication with provider: none. Has vv today with PCP office to discuss treatment for cold sores. He says they are all across the bottom of his lip now. Denies needs other than having that addressed. Has CTN contact info. Addressed changes since last contact:  vv scheduled  Discussed follow-up appointments. If no appointment was previously scheduled, appointment scheduling offered: completed. Is follow up appointment scheduled within 7 days of discharge? completed. Follow Up  Future Appointments   Date Time Provider [address]   2023  3:00 PM NAYELY Chavez - CNP [facility] [place] - [place]YD   10/5/2023 11:30 AM Ova Canavan, MD [facility] MMA   1/15/2024 11:20 AM Karen Tenorio MD [facility] [address],[place] [address] Transition Nurse reviewed medical action plan and red flags with patient and discussed any barriers to care and/or understanding of plan of care after discharge. Discussed appropriate site of care based on symptoms and resources available to patient including: PCP. The patient agrees to contact the PCP office for questions related to their healthcare.      Patients top risk factors for readmission: medical condition-see above  Interventions to address risk factors: Education of

## 2023-09-27 NOTE — PROGRESS NOTES
worsening, or proceed to the emergency room. All entries in chief complaint and history of present illness are reviewed and validated by me. No changes in past medical history, past surgical history, social history, or family history were noted during the patient encounter unless specifically listed above. All updates of past medical history, past surgical history, social history, or family history were reviewed personally by me during the office visit. All problems listed in the assessment are stable unless noted otherwise. Medication profile reviewed personally by me during the office visit. Medication side effects and possible impairments from medications were discussed as applicable. Every effort has been made to assure accurate transcription by this voice recognition software. However, mistakes in transcription may still occur    If you are being started on a new medication. All medications have the potential for adverse effects. All medications effect each person differently. Please read and review provided information related to medication. If the medication that you have been prescribed has the potential to cause sedation, do not drive or operate car, truck, or heavy machinery until you know how the medication will effect you. If you experience any adverse effects from the medication, please call the office or report to the emergency department. Oanh Mercado was evaluated through a synchronous (real-time) audio-video encounter. The patient (or guardian if applicable) is aware that this is a billable service, which includes applicable co-pays. This Virtual Visit was conducted with patient's (and/or legal guardian's) consent. Patient identification was verified, and a caregiver was present when appropriate.    The patient was located at Home: [address] [place] [address] Drive  [address] [address]  Provider was located at [facility] ([facility]): [facility] [place],  [facility]

## 2023-10-04 ENCOUNTER — CARE COORDINATION (OUTPATIENT)
Dept: CASE MANAGEMENT | Age: 68
End: 2023-10-04
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
## 2023-10-05 ENCOUNTER — OFFICE VISIT (OUTPATIENT)
Dept: PULMONOLOGY | Age: 68
End: 2023-10-05
Payer: MEDICARE

## 2023-10-05 ENCOUNTER — TELEPHONE (OUTPATIENT)
Dept: PULMONOLOGY | Age: 68
End: 2023-10-05

## 2023-10-05 VITALS
HEIGHT: 67 IN | SYSTOLIC BLOOD PRESSURE: 132 MMHG | OXYGEN SATURATION: 90 % | DIASTOLIC BLOOD PRESSURE: 66 MMHG | BODY MASS INDEX: 33.43 KG/M2 | HEART RATE: 93 BPM | WEIGHT: 213 LBS

## 2023-10-05 DIAGNOSIS — J44.9 CHRONIC OBSTRUCTIVE PULMONARY DISEASE, UNSPECIFIED COPD TYPE (HCC): Primary | ICD-10-CM

## 2023-10-05 DIAGNOSIS — R93.89 ABNORMAL CT OF THE CHEST: ICD-10-CM

## 2023-10-05 DIAGNOSIS — R59.0 HILAR LYMPHADENOPATHY: ICD-10-CM

## 2023-10-05 PROCEDURE — G8484 FLU IMMUNIZE NO ADMIN: HCPCS | Performed by: INTERNAL MEDICINE

## 2023-10-05 PROCEDURE — G8417 CALC BMI ABV UP PARAM F/U: HCPCS | Performed by: INTERNAL MEDICINE

## 2023-10-05 PROCEDURE — 3075F SYST BP GE 130 - 139MM HG: CPT | Performed by: INTERNAL MEDICINE

## 2023-10-05 PROCEDURE — 1036F TOBACCO NON-USER: CPT | Performed by: INTERNAL MEDICINE

## 2023-10-05 PROCEDURE — 3078F DIAST BP <80 MM HG: CPT | Performed by: INTERNAL MEDICINE

## 2023-10-05 PROCEDURE — G8427 DOCREV CUR MEDS BY ELIG CLIN: HCPCS | Performed by: INTERNAL MEDICINE

## 2023-10-05 PROCEDURE — 1124F ACP DISCUSS-NO DSCNMKR DOCD: CPT | Performed by: INTERNAL MEDICINE

## 2023-10-05 PROCEDURE — 99214 OFFICE O/P EST MOD 30 MIN: CPT | Performed by: INTERNAL MEDICINE

## 2023-10-05 PROCEDURE — 3023F SPIROM DOC REV: CPT | Performed by: INTERNAL MEDICINE

## 2023-10-05 PROCEDURE — 3017F COLORECTAL CA SCREEN DOC REV: CPT | Performed by: INTERNAL MEDICINE

## 2023-10-05 PROCEDURE — 1111F DSCHRG MED/CURRENT MED MERGE: CPT | Performed by: INTERNAL MEDICINE

## 2023-10-05 RX ORDER — PREDNISONE 10 MG/1
TABLET ORAL
Qty: 20 TABLET | Refills: 0 | Status: SHIPPED | OUTPATIENT
Start: 2023-10-05

## 2023-10-05 RX ORDER — IPRATROPIUM BROMIDE AND ALBUTEROL SULFATE 2.5; .5 MG/3ML; MG/3ML
1 SOLUTION RESPIRATORY (INHALATION) EVERY 4 HOURS PRN
Qty: 360 ML | Refills: 2 | Status: SHIPPED | OUTPATIENT
Start: 2023-10-05

## 2023-10-05 NOTE — PROGRESS NOTES
Chief Complaint COPD  Chief Complaint   Patient presents with    Follow-up     CXR        Interval History 10/5/23  - hospitalized in Sept 2023 with COVID and possible Pseudomonas/MRSA; sent home levaquin and zyvox and prednisone, did well until last 2 weeks, now worsening dyspnea on exertion & cough (with cough syncope) & increased oxygen requirement     Interval History:     Quit smoking x4 months when Dinh passed away; niece moved in & smokes - restarted. Shortness of breath      Presenting HPI: This is a 64 yo WM seen by me 7 years ago for hilar lymphadenopathy, had EBUS which was reassuring, now here with with a four-week history of increased severe dyspnea worse with exertion associated with significant cough which is minimally productive, admitted to [facility] on 1/9/18 and treated for an acute exacerbation of COPD. He had minimal improvement in the hospital.  However he saw his primary care provider and was prescribed steroids and antibiotic and did notice some improvement although he tells me he is now somewhat worse again and he is definitely nowhere near his baseline of last December. His baseline and he does have some shortness of breath although it's only with significant exertion and was not troubling to him in the past.       reports that he quit smoking 7 days ago. His smoking use included cigarettes and cigars. He started smoking about 5 years ago. He has a 21.00 pack-year smoking history. He has quit using smokeless tobacco.  His smokeless tobacco use included snuff. On average > 1-2 ppd    family history includes Cancer in his brother, mother, and another family member; Diabetes in his sister; Emphysema in his father; Hypertension in his sister. Physical Exam:  Blood pressure 132/66, pulse 93, height 5' 7\" (1.702 m), weight 213 lb (96.6 kg), SpO2 90 %.' 4 L   Constitutional:  No acute distress. HENT:  Oropharynx is clear and moist.   Neck: No tracheal deviation present.

## 2023-10-05 NOTE — TELEPHONE ENCOUNTER
Called [phone] (home)   Spoke w/ pt and informed of appointments made. Ct chest w/ dye is on 11-10-23 @ 4pm and will need to arrive 30 min early at 330pm. Pt repeated date/time and 30min arrival time prior to appointment back to me. Also informed pt of f/u w/ Dr. Janes Guzmán on 11-21-23 @ 345. Again pt repeated appt time/date back to me correctly.

## 2023-10-10 ENCOUNTER — CARE COORDINATION (OUTPATIENT)
Dept: CASE MANAGEMENT | Age: 68
End: 2023-10-10

## 2023-10-10 RX ORDER — ATORVASTATIN CALCIUM 40 MG/1
TABLET, FILM COATED ORAL
Qty: 30 TABLET | Refills: 2 | Status: SHIPPED | OUTPATIENT
Start: 2023-10-10

## 2023-10-10 NOTE — CARE COORDINATION
[facility] Care Transitions Follow Up Call    Patient Current Location: Home: [address] [place] [address] [address]    Care Transition Nurse contacted the patient by telephone to follow up after recent hospital admission. Patient: Dixon Garcia  Patient : 1955   MRN: [id number]  Reason for Admission: 5 days -> COVID PNA, COVID+ 2023, COPD exac, DM2 with neuropathy, hx alcoholic liver cirrhosis, HTN, HLD, gout, PAD s/p aortofemoral bypass -> home no services, baseline O2 at 3L (Leading Resp)  Discharge Date: 9/10/23 RARS: Readmission Risk Score: 13.5      Needs to be reviewed by the provider   Additional needs identified to be addressed with provider: No           Method of communication with provider: none. Spoke with patient who reports he is doing well. Dr Bianca Singer started him on 10 days of prednisone and that is helping. Also has new Duonebs for nebulizer. States he was using 3 packets every 4 hours because that is what Dr Bianca Singer told him but that dose made him sick and nauseated so he was doing one every 2 hours but was also sick. He is now taking as prescribed and tolerating. He now has a portable concentrator and CTN educated him on goal SaO2. He has been trialing without O2 and dropping to 80s. CTN advised against going without O2 and educated on weaning. CTN instructed on s/s to contact PCP or Dr Bianca Singer same day for recommendation or OV. He voices understanding. Denies needs at this time. Reviewed upcoming CT and [facility] appt per his request - he plans to attend as scheduled. Care transition outreach complete. He has CTN contact info for future needs. Addressed changes since last contact:   see note  Discussed follow-up appointments. If no appointment was previously scheduled, appointment scheduling offered: completed. Is follow up appointment scheduled within 7 days of discharge? completed.     Follow Up  Future Appointments   Date Time Provider [address]   11/10/2023  4:00 PM MMO CT RM 1

## 2023-10-16 RX ORDER — CALCIUM CITRATE/VITAMIN D3 200MG-6.25
TABLET ORAL
Qty: 50 EACH | Refills: 0 | Status: SHIPPED | OUTPATIENT
Start: 2023-10-16

## 2023-10-19 DIAGNOSIS — F41.9 ANXIETY: ICD-10-CM

## 2023-10-19 DIAGNOSIS — E11.42 DIABETIC POLYNEUROPATHY ASSOCIATED WITH TYPE 2 DIABETES MELLITUS (HCC): ICD-10-CM

## 2023-10-19 DIAGNOSIS — F41.8 SITUATIONAL ANXIETY: ICD-10-CM

## 2023-10-19 RX ORDER — DULOXETIN HYDROCHLORIDE 60 MG/1
CAPSULE, DELAYED RELEASE ORAL
Qty: 30 CAPSULE | Refills: 2 | Status: SHIPPED | OUTPATIENT
Start: 2023-10-19

## 2023-10-19 NOTE — TELEPHONE ENCOUNTER
Refill Request     CONFIRM preferrred pharmacy with the patient. If Mail Order Rx - Pend for 90 day refill. Last Seen: Last Seen Department: 9/27/2023  Last Seen by PCP: 9/13/2023    Last Written: 9/29/2023    If no future appointment scheduled, route STAFF MESSAGE with patient name to the [facility] for scheduling. Next Appointment:   Future Appointments   Date Time Provider [address]   11/10/2023  4:00 PM MMO CT RM 1 [facility] [facility]   11/21/2023  3:45 PM Alex James MD [facility]   1/15/2024 11:20 AM Talbert Spurling, MD [address]       Message sent to [address] [place] [address] to schedule appt with patient?   NO      Requested Prescriptions     Pending Prescriptions Disp Refills    DULoxetine (CYMBALTA) 60 MG extended release capsule [Pharmacy Med Name: DULOXETINE HYDROCHLORIDE 60MG CAPSULE DR PART] 30 capsule 11     Sig: TAKE ONE CAPSULE BY MOUTH EVERY DAY

## 2023-10-31 DIAGNOSIS — E55.9 VITAMIN D DEFICIENCY: ICD-10-CM

## 2023-11-01 RX ORDER — BLOOD PRESSURE TEST KIT
KIT MISCELLANEOUS
Qty: 100 EACH | Refills: 5 | Status: SHIPPED | OUTPATIENT
Start: 2023-11-01

## 2023-11-07 ENCOUNTER — TELEPHONE (OUTPATIENT)
Dept: FAMILY MEDICINE CLINIC | Age: 68
End: 2023-11-07

## 2023-11-07 NOTE — TELEPHONE ENCOUNTER
Called patient and informed it may have been [facility] from [address] calling this was the location that his last oxygen supply order  was faxed to.

## 2023-11-07 NOTE — TELEPHONE ENCOUNTER
Pt called and stated that he never received his O2 yesterday. Said that he talked to Emmanuel yesterday and they told him that it was all taken care of. Well it was not and he was wanting to see if we could help him so he can get it.

## 2023-11-15 DIAGNOSIS — J41.8 MIXED SIMPLE AND MUCOPURULENT CHRONIC BRONCHITIS (HCC): ICD-10-CM

## 2023-11-15 RX ORDER — INSULIN GLARGINE 100 [IU]/ML
INJECTION, SOLUTION SUBCUTANEOUS
Qty: 15 ML | Refills: 0 | Status: SHIPPED | OUTPATIENT
Start: 2023-11-15

## 2023-11-15 RX ORDER — DILTIAZEM HYDROCHLORIDE 180 MG/1
CAPSULE, EXTENDED RELEASE ORAL
Qty: 90 CAPSULE | Refills: 0 | Status: SHIPPED | OUTPATIENT
Start: 2023-11-15

## 2023-11-15 RX ORDER — ALBUTEROL SULFATE 90 UG/1
AEROSOL, METERED RESPIRATORY (INHALATION)
Qty: 8.5 G | Refills: 5 | Status: SHIPPED | OUTPATIENT
Start: 2023-11-15

## 2023-11-15 NOTE — TELEPHONE ENCOUNTER
Refill Request     CONFIRM preferrred pharmacy with the patient. If Mail Order Rx - Pend for 90 day refill. Last Seen: Last Seen Department: 9/27/2023  Last Seen by PCP: 9/13/2023    Last Written: 8/1/2023    If no future appointment scheduled, route STAFF MESSAGE with patient name to the [facility] for scheduling. Next Appointment:   Future Appointments   Date Time Provider [address]   11/17/2023 12:00 PM [facility]MO CT [facility]   11/21/2023  3:45 PM Taylor Holcomb MD [facility] MMA   1/15/2024 11:20 AM Kishore Bauer MD [address]       Message sent to [address] [place] [address] to schedule appt with patient?   NO      Requested Prescriptions     Pending Prescriptions Disp Refills    Insulin Glargine Solostar 100 UNIT/ML SOPN [Pharmacy Med Name: INSULIN GLARGINE SOLOSTAR 100U/ML SOLN PEN-INJ] 15 mL 0     Sig: INJECT 25 UNITS INTO THE SKIN DAILY WITH SUPPER    albuterol sulfate HFA (PROVENTIL;VENTOLIN;PROAIR) 108 (90 Base) MCG/ACT inhaler [Pharmacy Med Name: ALBUTEROL SULFATE HFA HFA AEROSOL SOLN] 8.5 g 5     Sig: INHALE TWO (2) PUFFS EVERY SIX (6) HOURS AS NEEDED FOR WHEEZING    dilTIAZem (TIAZAC) 180 MG extended release capsule [Pharmacy Med Name: DILTIAZEM HCL ER 180MG/24 CAPSULE ER 24HR] 90 capsule 0     Sig: TAKE ONE (1) CAPSULE BY MOUTH DAILY

## 2023-11-17 ENCOUNTER — HOSPITAL ENCOUNTER (OUTPATIENT)
Age: 68
Discharge: HOME | End: 2023-11-17
Payer: MEDICARE

## 2023-11-17 ENCOUNTER — HOSPITAL ENCOUNTER (OUTPATIENT)
Dept: CT IMAGING | Age: 68
Discharge: HOME | End: 2023-11-17
Payer: MEDICARE

## 2023-11-17 DIAGNOSIS — R93.89 ABNORMAL CT OF THE CHEST: ICD-10-CM

## 2023-11-17 DIAGNOSIS — R59.0 HILAR LYMPHADENOPATHY: ICD-10-CM

## 2023-11-17 DIAGNOSIS — J44.9 CHRONIC OBSTRUCTIVE PULMONARY DISEASE, UNSPECIFIED COPD TYPE (HCC): ICD-10-CM

## 2023-11-17 LAB
CREAT SERPL-MCNC: 0.9 MG/DL (ref 0.8–1.3)
GFR SERPLBLD CREATININE-BSD FMLA CKD-EPI: >60 ML/MIN/{1.73_M2}

## 2023-11-17 PROCEDURE — 82565 ASSAY OF CREATININE: CPT

## 2023-11-17 PROCEDURE — 71260 CT THORAX DX C+: CPT

## 2023-11-17 PROCEDURE — 6360000004 HC RX CONTRAST MEDICATION: Performed by: INTERNAL MEDICINE

## 2023-11-17 PROCEDURE — 36415 COLL VENOUS BLD VENIPUNCTURE: CPT

## 2023-11-17 RX ADMIN — IOMEPROL INJECTION 75 ML: 714 INJECTION, SOLUTION INTRAVASCULAR at 12:33

## 2023-11-19 NOTE — TELEPHONE ENCOUNTER
4 Eyes Skin Assessment     NAME:  Rosy Walsh  YOB: 1931  MEDICAL RECORD NUMBER:  [id number]    The patient is being assessed for  Admission    I agree that at least one RN has performed a thorough Head to Toe Skin Assessment on the patient. ALL assessment sites listed below have been assessed. Areas assessed by both nurses:    Head, Face, Ears, Shoulders, Back, Chest, Arms, Elbows, Hands, Sacrum. Buttock, Coccyx, Ischium, Legs. Feet and Heels, and Under Medical Devices         Does the Patient have a Wound?  No noted wound(s)       Reagan Prevention initiated by RN: No  Wound Care Orders initiated by RN: No    Pressure Injury (Stage 3,4, Unstageable, DTI, NWPT, and Complex wounds) if present, place Wound referral order by RN under : No    New Ostomies, if present place, Ostomy referral order under : No     Nurse 1 eSignature: Electronically signed by Annabelle Charlton RN on 11/17/23 at 6:48 PM EST    **SHARE this note so that the co-signing nurse can place an eSignature**    Nurse 2 eSignature: {Esignature:[id number]} 81year old lady with pain RLQ x a month after a strain in muscle catching her falling daughter with PD  And then an initiation of FTT syndrome  FTT and diarrhea , stool inc.and very poor appetite  EETFBB reviewed  Abdomen no femoral hernia  No Guarding  or rebound  CT abdomen reviewed  Hx of SDAT mild  Hx of NSAIDS  Plan: Review hx and MOM trial           Lidocaine patch for now and follow           Expect sundowning tonite Alert and baseline  Pleasant  VS stable  Standing  BP at 11 AM was 146/x , Siting 160's/x  Back on Norvas  Nursing reported brief burst of  AFIb  And now in NSR  RLQ pain is not  hip Fx  Also RLQ old incision scar not tender  this AM. Lidocaie topical ordered  Cannot determine whether MOM has helped  Mental status testing       1/6 point fix  Cannot recall President  ATP   __ __  __  Knows 4 kids names ,,Buzz Ida and Lashonda  CLOCK 4/4  CT abdomen reviewed  Impression; RLQ pain musculoskeletal                      SDAT on Aricept                      Fecal stasis                      Short burst of AFib  Plan: Continue to observe           D/C Fentanyl Alert and baseline  Sitter in room  Labile BP but controlled on present meds  Immobile but able  Poor intake   Status of FECES?  KUB after Fleets  Discharge soon Physical Therapy    Initial Assessment     Name: Sarkis Michaels  : 10/12/1931  MRN: [id number]      Date of Service: 2023    Evaluating PT: Kellen Watkins PT, JONI [id number]      Room #:  5447/0002-U  Diagnosis:  Intractable abdominal pain [R10.9]  PMHx/PSHx:   has a past medical history of GERD (gastroesophageal reflux disease), Hyperlipidemia, and Hypertension. Precautions:  Fall risk, [place], Alarm    SUBJECTIVE:    Pt lives with  in a 2 story house with 1 stair(s) and no rail(s) to enter. Full flight of stairs and 1 rail to second floor bed and bath. Pt ambulated without AD prior to admission. OBJECTIVE:   Initial Evaluation  Date: 23 Treatment Date: Short Term/ Long Term   Goals   AM-PAC 6 Clicks      Was pt agreeable to Eval/treatment? Yes     Does pt have pain? No complaints of pain     Bed Mobility  Rolling: NT  Supine to sit: SBA  Sit to supine: SBA  Scooting: SBA to EOB  Rolling: Independent   Supine to sit: Independent   Sit to supine: Independent   Scooting: Independent    Transfers Sit to stand: SBA  Stand to sit: SBA  Stand pivot: SBA without AD  Sit to stand: Independent   Stand to sit: Independent   Stand pivot: Independent    Ambulation   200 feet without AD with SBA  >400 feet Independent    Stair negotiation: ascended and descended NT  >12 step(s) with 1 rail(s) Mod Independent    ROM BUE: Refer to OT note  BLE: WFL     Strength BUE: Refer to OT note  BLE: WFL     Balance Sitting EOB: SBA  Dynamic Standing: SBA without AD  Sitting EOB: Independent   Dynamic Standing: Independent      Pt is A & O x: 4 to person, place, month/year, and situation. Sensation: Pt denies numbness and tingling of extremities. Edema: Unremarkable. Patient education  Pt educated on PT role in acute care setting.     Patient response to education:   Pt verbalized understanding Pt demonstrated skill Pt requires further education in this area   Yes NA No     ASSESSMENT:    Conditions Please review looks like rx was sent for Zohreh Pritchard on 2/22/22. Pt complaining of nausea. Page out to Dr. Kory Lion.  Electronically signed by Madelyn Garcia RN on 11/18/2023 at 1:44 PM Pt resting comfortably in bed. Respirations even and unlabored. Pt voiced no needs at this time. Dr. Amanda Koehler in room speaking with patient.  Electronically signed by Saran Loza RN on 11/18/2023 at 11:41 AM Based On: Formula  Weight Used for Energy Requirements: Current  Energy (kcal/day): 1000-1200kcal (MSJ x1. 1SF)  Weight Used for Protein Requirements: Current  Protein (g/day): 70-82g (1.3-1.5g/kg)  Method Used for Fluid Requirements: 1 ml/kcal  Fluid (ml/day): 2264-0611    Nutrition Diagnosis:   Inadequate oral intake related to pain (RLQ /abd pain) as evidenced by poor intake prior to admission, intake 0-25%, nausea    Nutrition Interventions:   Food and/or Nutrient Delivery: Continue Current Diet, Start Oral Nutrition Supplement (start ONS: Ensure + BID, magic cup once daily)  Nutrition Education/Counseling: No recommendation at this time  Coordination of Nutrition Care: Continue to monitor while inpatient       Goals:     Goals: PO intake 50% or greater, by next RD assessment       Nutrition Monitoring and Evaluation:   Behavioral-Environmental Outcomes: None Identified  Food/Nutrient Intake Outcomes: Food and Nutrient Intake, Supplement Intake, Diet Advancement/Tolerance  Physical Signs/Symptoms Outcomes: Biochemical Data, GI Status, Fluid Status or Edema, Skin, Weight, Nutrition Focused Physical Findings, Nausea or Vomiting    Discharge Planning:     Too soon to determine     Ruel Solorio RD  Contact: ext [phone]

## 2023-11-30 ENCOUNTER — TELEPHONE (OUTPATIENT)
Dept: PULMONOLOGY | Age: 68
End: 2023-11-30

## 2023-11-30 ENCOUNTER — OFFICE VISIT (OUTPATIENT)
Dept: PULMONOLOGY | Age: 68
End: 2023-11-30
Payer: MEDICARE

## 2023-11-30 VITALS
HEIGHT: 67 IN | OXYGEN SATURATION: 96 % | RESPIRATION RATE: 14 BRPM | HEART RATE: 113 BPM | DIASTOLIC BLOOD PRESSURE: 68 MMHG | BODY MASS INDEX: 33.24 KG/M2 | SYSTOLIC BLOOD PRESSURE: 138 MMHG | WEIGHT: 211.8 LBS

## 2023-11-30 DIAGNOSIS — Z72.0 TOBACCO ABUSE: ICD-10-CM

## 2023-11-30 DIAGNOSIS — J44.9 CHRONIC OBSTRUCTIVE PULMONARY DISEASE, UNSPECIFIED COPD TYPE (HCC): Primary | ICD-10-CM

## 2023-11-30 DIAGNOSIS — E66.9 OBESITY (BMI 30.0-34.9): ICD-10-CM

## 2023-11-30 PROCEDURE — 3017F COLORECTAL CA SCREEN DOC REV: CPT | Performed by: INTERNAL MEDICINE

## 2023-11-30 PROCEDURE — G8417 CALC BMI ABV UP PARAM F/U: HCPCS | Performed by: INTERNAL MEDICINE

## 2023-11-30 PROCEDURE — 1124F ACP DISCUSS-NO DSCNMKR DOCD: CPT | Performed by: INTERNAL MEDICINE

## 2023-11-30 PROCEDURE — 3078F DIAST BP <80 MM HG: CPT | Performed by: INTERNAL MEDICINE

## 2023-11-30 PROCEDURE — 3023F SPIROM DOC REV: CPT | Performed by: INTERNAL MEDICINE

## 2023-11-30 PROCEDURE — 3075F SYST BP GE 130 - 139MM HG: CPT | Performed by: INTERNAL MEDICINE

## 2023-11-30 PROCEDURE — 1036F TOBACCO NON-USER: CPT | Performed by: INTERNAL MEDICINE

## 2023-11-30 PROCEDURE — G8427 DOCREV CUR MEDS BY ELIG CLIN: HCPCS | Performed by: INTERNAL MEDICINE

## 2023-11-30 PROCEDURE — G8484 FLU IMMUNIZE NO ADMIN: HCPCS | Performed by: INTERNAL MEDICINE

## 2023-11-30 PROCEDURE — 99214 OFFICE O/P EST MOD 30 MIN: CPT | Performed by: INTERNAL MEDICINE

## 2023-11-30 RX ORDER — OXYCODONE HYDROCHLORIDE AND ACETAMINOPHEN 5; 325 MG/1; MG/1
TABLET ORAL
COMMUNITY

## 2023-11-30 RX ORDER — HYDROMORPHONE HYDROCHLORIDE 2 MG/1
TABLET ORAL
COMMUNITY
Start: 2020-06-18

## 2023-11-30 RX ORDER — PEN NEEDLE, DIABETIC 31 GX5/16"
NEEDLE, DISPOSABLE MISCELLANEOUS
COMMUNITY
Start: 2023-10-31

## 2023-11-30 RX ORDER — NALOXONE HYDROCHLORIDE 4 MG/.1ML
SPRAY NASAL
COMMUNITY
Start: 2023-11-13

## 2023-11-30 NOTE — PROGRESS NOTES
Oxygen tested in office:    96% RA at 2 LPM  1 min of walking - 95% on 2 LPM  2 min of walking - 93% on 2 LPM  3 min of walking - 92% on 2 LPM.  Pt briefly dropped to 91% but it came right back up to 92%.

## 2023-11-30 NOTE — PROGRESS NOTES
Chief Complaint COPD  Chief Complaint   Patient presents with    Follow-up     CT      Interval History 11/30/23  - had f/u CT scan, doing okay but off and on tobacco use. His concentrator battery runs out after 2-3 hours which limits his mobility. Interval History 10/5/23  - hospitalized in Sept 2023 with COVID and possible Pseudomonas/MRSA; sent home levaquin and zyvox and prednisone, did well until last 2 weeks, now worsening dyspnea on exertion & cough (with cough syncope) & increased oxygen requirement     Interval History:     Quit smoking x4 months when Dinh passed away; niece moved in & smokes - restarted. Shortness of breath      Presenting HPI: This is a 64 yo WM seen by me 7 years ago for hilar lymphadenopathy, had EBUS which was reassuring, now here with with a four-week history of increased severe dyspnea worse with exertion associated with significant cough which is minimally productive, admitted to [facility] on [facility]/9[facility]18 and treated for an acute exacerbation of COPD. He had minimal improvement in the hospital.  However he saw his primary care provider and was prescribed steroids and antibiotic and did notice some improvement although he tells me he is now somewhat worse again and he is definitely nowhere near his baseline of last December. His baseline and he does have some shortness of breath although it's only with significant exertion and was not troubling to him in the past.       reports that he quit smoking about 2 months ago. His smoking use included cigarettes and cigars. He started smoking about 5 years ago. He has a 21.00 pack-year smoking history. He has quit using smokeless tobacco.  His smokeless tobacco use included snuff. On average > 1-2 ppd    family history includes Cancer in his brother, mother, and another family member; Diabetes in his sister; Emphysema in his father; Hypertension in his sister.        Physical Exam:  Blood pressure 138/68, pulse (!) 113,

## 2023-11-30 NOTE — TELEPHONE ENCOUNTER
Pt needs to be scheduled for CT lung screen and appt in 1 year. Pt had to leave ASAP and couldn't wait to be scheduled.

## 2023-12-04 DIAGNOSIS — J41.8 MIXED SIMPLE AND MUCOPURULENT CHRONIC BRONCHITIS (HCC): ICD-10-CM

## 2023-12-04 RX ORDER — ALLOPURINOL 300 MG/1
TABLET ORAL
Qty: 30 TABLET | Refills: 2 | Status: SHIPPED | OUTPATIENT
Start: 2023-12-04

## 2023-12-04 RX ORDER — ALBUTEROL SULFATE 90 UG/1
AEROSOL, METERED RESPIRATORY (INHALATION)
Qty: 8.5 G | Refills: 5 | Status: SHIPPED | OUTPATIENT
Start: 2023-12-04

## 2023-12-04 NOTE — TELEPHONE ENCOUNTER
Refill Request     CONFIRM preferrred pharmacy with the patient. If Mail Order Rx - Pend for 90 day refill. Last Seen: Last Seen Department: 9/27/2023  Last Seen by PCP: 9/13/2023    Last Written: 7-    If no future appointment scheduled, route STAFF MESSAGE with patient name to the [facility] for scheduling. Next Appointment:   Future Appointments   Date Time Provider [address] [place] [address]   1/15/2024 11:20 AM Sharla Salvador MD [address]       Message sent to [address] [place] [address] to schedule appt with patient?   N/A      Requested Prescriptions     Pending Prescriptions Disp Refills    allopurinol (ZYLOPRIM) 300 MG tablet [Pharmacy Med Name: ALLOPURINOL 300MG TABLET] 30 tablet 2     Sig: TAKE ONE (1) TABLET BY MOUTH DAILY

## 2023-12-04 NOTE — TELEPHONE ENCOUNTER
Refill Request     CONFIRM preferrred pharmacy with the patient. If Mail Order Rx - Pend for 90 day refill. Last Seen: Last Seen Department: 9/27/2023  Last Seen by PCP: 9/13/2023    Last Written: 11-    If no future appointment scheduled, route STAFF MESSAGE with patient name to the [facility] for scheduling. Next Appointment:   Future Appointments   Date Time Provider [address] [place] [address]   1/15/2024 11:20 AM Sergio Aranda MD [address]        Message sent to [address] [place] [address] to schedule appt with patient?   N/A      Requested Prescriptions     Pending Prescriptions Disp Refills    albuterol sulfate HFA (PROVENTIL;VENTOLIN;PROAIR) 108 (90 Base) MCG/ACT inhaler [Pharmacy Med Name: ALBUTEROL SULFATE HFA HFA AEROSOL SOLN] 8.5 g 5     Sig: INHALE TWO (2) PUFFS EVERY SIX (6) HOURS AS NEEDED FOR WHEEZING

## 2023-12-08 NOTE — TELEPHONE ENCOUNTER
Spoke with pt, scheduled CT lung screen for 11/18/24 and follow up 11/19/24. Mailed appt info to pt as well.

## 2024-01-24 RX ORDER — PANTOPRAZOLE SODIUM 40 MG/1
TABLET, DELAYED RELEASE ORAL
Qty: 30 TABLET | Refills: 3 | Status: SHIPPED | OUTPATIENT
Start: 2024-01-24

## 2024-01-24 RX ORDER — FLUTICASONE FUROATE, UMECLIDINIUM BROMIDE AND VILANTEROL TRIFENATATE 100; 62.5; 25 UG/1; UG/1; UG/1
POWDER RESPIRATORY (INHALATION)
Qty: 60 EACH | Refills: 5 | Status: SHIPPED | OUTPATIENT
Start: 2024-01-24

## 2024-01-24 NOTE — TELEPHONE ENCOUNTER
Refill Request     CONFIRM preferrred pharmacy with the patient.    If Mail Order Rx - Pend for 90 day refill.      Last Seen: Last Seen Department: 9/27/2023  Last Seen by PCP: 9/13/2023    Last Written: 9/21/2023    If no future appointment scheduled, route STAFF MESSAGE with patient name to the  Pool for scheduling.      Next Appointment:   Future Appointments   Date Time Provider Department Center   2/19/2024 10:40 AM [facility]   11/18/2024 10:00 AM [facility]   11/19/2024 10:30 AM Ben Medeiros MD [facility]       Message sent to  to schedule appt with patient?  NO      Requested Prescriptions     Pending Prescriptions Disp Refills    pantoprazole (PROTONIX) 40 MG tablet [Pharmacy Med Name: PANTOPRAZOLE SODIUM 40MG TABLET DR] 30 tablet 3     Sig: TAKE ONE TABLET BY MOUTH EVERY MORNING BEFORE BREAKFAST    TRELEGY ELLIPTA 100-62.5-25 MCG/ACT AEPB inhaler [Pharmacy Med Name: TRELEGY ELLIPTA 100MCG AERO POW BR ACT]  5     Sig: INHALE ONE (1) PUFF INTO THE LUNGS IN THE MORNING.

## 2024-01-29 ENCOUNTER — TELEPHONE (OUTPATIENT)
Dept: FAMILY MEDICINE CLINIC | Age: 69
End: 2024-01-29

## 2024-01-29 RX ORDER — METHYLPREDNISOLONE 4 MG/1
TABLET ORAL
Qty: 1 KIT | Refills: 0 | Status: SHIPPED | OUTPATIENT
Start: 2024-01-29 | End: 2024-02-04

## 2024-01-29 NOTE — TELEPHONE ENCOUNTER
Pt states that he has a flare up of the gout in his left foot. And he is taking his meds was wanting to see if there is anything else that he can get to help with it. And he uses [place] [facility].

## 2024-01-30 DIAGNOSIS — E11.42 DIABETIC POLYNEUROPATHY ASSOCIATED WITH TYPE 2 DIABETES MELLITUS (HCC): ICD-10-CM

## 2024-01-30 DIAGNOSIS — F41.8 SITUATIONAL ANXIETY: ICD-10-CM

## 2024-01-30 DIAGNOSIS — E55.9 VITAMIN D DEFICIENCY: ICD-10-CM

## 2024-01-30 DIAGNOSIS — F41.9 ANXIETY: ICD-10-CM

## 2024-01-30 RX ORDER — DULOXETIN HYDROCHLORIDE 60 MG/1
CAPSULE, DELAYED RELEASE ORAL
Qty: 30 CAPSULE | Refills: 2 | Status: SHIPPED | OUTPATIENT
Start: 2024-01-30

## 2024-01-30 RX ORDER — ALLOPURINOL 300 MG/1
TABLET ORAL
Qty: 30 TABLET | Refills: 2 | Status: SHIPPED | OUTPATIENT
Start: 2024-01-30

## 2024-01-30 RX ORDER — INSULIN GLARGINE 100 [IU]/ML
INJECTION, SOLUTION SUBCUTANEOUS
Qty: 15 ML | Refills: 0 | Status: SHIPPED | OUTPATIENT
Start: 2024-01-30

## 2024-01-30 RX ORDER — PNV NO.95/FERROUS FUM/FOLIC AC 28MG-0.8MG
TABLET ORAL
Qty: 30 TABLET | Refills: 2 | Status: SHIPPED | OUTPATIENT
Start: 2024-01-30

## 2024-01-30 NOTE — TELEPHONE ENCOUNTER
Future appt scheduled 02/19/2024                 Last appt 09/27/2023      Last Written 11/15/2023    Insulin Glargine Solostar 100 UNIT/ML SOPN  15 mL  0 RF

## 2024-02-07 ENCOUNTER — APPOINTMENT (OUTPATIENT)
Dept: GENERAL RADIOLOGY | Age: 69
End: 2024-02-07
Payer: MEDICARE

## 2024-02-07 ENCOUNTER — HOSPITAL ENCOUNTER (EMERGENCY)
Age: 69
Discharge: ANOTHER HOSPITAL | End: 2024-02-08
Attending: EMERGENCY MEDICINE
Payer: MEDICARE

## 2024-02-07 DIAGNOSIS — F10.90 ALCOHOL USE DISORDER: ICD-10-CM

## 2024-02-07 DIAGNOSIS — J44.9 CHRONIC OBSTRUCTIVE PULMONARY DISEASE, UNSPECIFIED COPD TYPE (HCC): ICD-10-CM

## 2024-02-07 DIAGNOSIS — S82.892A CLOSED FRACTURE OF LEFT ANKLE, INITIAL ENCOUNTER: Primary | ICD-10-CM

## 2024-02-07 DIAGNOSIS — R79.89 ELEVATED D-DIMER: ICD-10-CM

## 2024-02-07 LAB
ALBUMIN SERPL-MCNC: 4 G/DL (ref 3.4–5)
ALBUMIN/GLOB SERPL: 1.3 {RATIO} (ref 1.1–2.2)
ALP SERPL-CCNC: 199 U/L (ref 40–129)
ALT SERPL-CCNC: 48 U/L (ref 10–40)
ANION GAP SERPL CALCULATED.3IONS-SCNC: 13 MMOL/L (ref 3–16)
AST SERPL-CCNC: 58 U/L (ref 15–37)
BASOPHILS # BLD: 0 K/UL (ref 0–0.2)
BASOPHILS NFR BLD: 0.3 %
BILIRUB SERPL-MCNC: 0.4 MG/DL (ref 0–1)
BUN SERPL-MCNC: 12 MG/DL (ref 7–20)
CALCIUM SERPL-MCNC: 9.5 MG/DL (ref 8.3–10.6)
CHLORIDE SERPL-SCNC: 93 MMOL/L (ref 99–110)
CO2 SERPL-SCNC: 29 MMOL/L (ref 21–32)
CREAT SERPL-MCNC: 0.8 MG/DL (ref 0.8–1.3)
D DIMER: 1.59 UG/ML FEU (ref 0–0.6)
DEPRECATED RDW RBC AUTO: 15.3 % (ref 12.4–15.4)
EOSINOPHIL # BLD: 0.2 K/UL (ref 0–0.6)
EOSINOPHIL NFR BLD: 1.6 %
ERYTHROCYTE [SEDIMENTATION RATE] IN BLOOD BY WESTERGREN METHOD: 20 MM/HR (ref 0–20)
GFR SERPLBLD CREATININE-BSD FMLA CKD-EPI: >60 ML/MIN/{1.73_M2}
GLUCOSE BLD-MCNC: 158 MG/DL (ref 70–99)
GLUCOSE SERPL-MCNC: 214 MG/DL (ref 70–99)
HCT VFR BLD AUTO: 45.2 % (ref 40.5–52.5)
HGB BLD-MCNC: 15.3 G/DL (ref 13.5–17.5)
INR PPP: 0.93 (ref 0.84–1.16)
LACTATE BLDV-SCNC: 1.8 MMOL/L (ref 0.4–1.9)
LYMPHOCYTES # BLD: 1 K/UL (ref 1–5.1)
LYMPHOCYTES NFR BLD: 7.8 %
MCH RBC QN AUTO: 30.6 PG (ref 26–34)
MCHC RBC AUTO-ENTMCNC: 33.9 G/DL (ref 31–36)
MCV RBC AUTO: 90.4 FL (ref 80–100)
MONOCYTES # BLD: 0.9 K/UL (ref 0–1.3)
MONOCYTES NFR BLD: 6.7 %
NEUTROPHILS # BLD: 10.8 K/UL (ref 1.7–7.7)
NEUTROPHILS NFR BLD: 83.6 %
PERFORMED ON: ABNORMAL
PLATELET # BLD AUTO: 185 K/UL (ref 135–450)
PMV BLD AUTO: 7.1 FL (ref 5–10.5)
POTASSIUM SERPL-SCNC: 5.2 MMOL/L (ref 3.5–5.1)
PROT SERPL-MCNC: 7.2 G/DL (ref 6.4–8.2)
PROTHROMBIN TIME: 12.5 SEC (ref 11.5–14.8)
RBC # BLD AUTO: 5 M/UL (ref 4.2–5.9)
SODIUM SERPL-SCNC: 135 MMOL/L (ref 136–145)
URATE SERPL-MCNC: 5.4 MG/DL (ref 3.5–7.2)
WBC # BLD AUTO: 12.9 K/UL (ref 4–11)

## 2024-02-07 PROCEDURE — 99285 EMERGENCY DEPT VISIT HI MDM: CPT

## 2024-02-07 PROCEDURE — 85025 COMPLETE CBC W/AUTO DIFF WBC: CPT

## 2024-02-07 PROCEDURE — 6360000002 HC RX W HCPCS: Performed by: EMERGENCY MEDICINE

## 2024-02-07 PROCEDURE — 96374 THER/PROPH/DIAG INJ IV PUSH: CPT

## 2024-02-07 PROCEDURE — 85652 RBC SED RATE AUTOMATED: CPT

## 2024-02-07 PROCEDURE — 73610 X-RAY EXAM OF ANKLE: CPT

## 2024-02-07 PROCEDURE — 84550 ASSAY OF BLOOD/URIC ACID: CPT

## 2024-02-07 PROCEDURE — 36415 COLL VENOUS BLD VENIPUNCTURE: CPT

## 2024-02-07 PROCEDURE — 85379 FIBRIN DEGRADATION QUANT: CPT

## 2024-02-07 PROCEDURE — 85610 PROTHROMBIN TIME: CPT

## 2024-02-07 PROCEDURE — 6370000000 HC RX 637 (ALT 250 FOR IP): Performed by: STUDENT IN AN ORGANIZED HEALTH CARE EDUCATION/TRAINING PROGRAM

## 2024-02-07 PROCEDURE — 83605 ASSAY OF LACTIC ACID: CPT

## 2024-02-07 PROCEDURE — 86140 C-REACTIVE PROTEIN: CPT

## 2024-02-07 PROCEDURE — 6360000002 HC RX W HCPCS

## 2024-02-07 PROCEDURE — 80053 COMPREHEN METABOLIC PANEL: CPT

## 2024-02-07 PROCEDURE — 73590 X-RAY EXAM OF LOWER LEG: CPT

## 2024-02-07 PROCEDURE — 96372 THER/PROPH/DIAG INJ SC/IM: CPT

## 2024-02-07 RX ORDER — ALBUTEROL SULFATE 2.5 MG/3ML
SOLUTION RESPIRATORY (INHALATION)
Status: COMPLETED
Start: 2024-02-07 | End: 2024-02-07

## 2024-02-07 RX ORDER — ALBUTEROL SULFATE 2.5 MG/3ML
2.5 SOLUTION RESPIRATORY (INHALATION) ONCE
Status: COMPLETED | OUTPATIENT
Start: 2024-02-07 | End: 2024-02-07

## 2024-02-07 RX ORDER — GABAPENTIN 300 MG/1
900 CAPSULE ORAL 3 TIMES DAILY
Status: DISCONTINUED | OUTPATIENT
Start: 2024-02-07 | End: 2024-02-08 | Stop reason: HOSPADM

## 2024-02-07 RX ORDER — INSULIN LISPRO 100 [IU]/ML
0-4 INJECTION, SOLUTION INTRAVENOUS; SUBCUTANEOUS
Status: DISCONTINUED | OUTPATIENT
Start: 2024-02-08 | End: 2024-02-08 | Stop reason: HOSPADM

## 2024-02-07 RX ORDER — MORPHINE SULFATE 4 MG/ML
4 INJECTION, SOLUTION INTRAMUSCULAR; INTRAVENOUS ONCE
Status: COMPLETED | OUTPATIENT
Start: 2024-02-07 | End: 2024-02-07

## 2024-02-07 RX ORDER — INSULIN GLARGINE 100 [IU]/ML
15 INJECTION, SOLUTION SUBCUTANEOUS ONCE
Status: COMPLETED | OUTPATIENT
Start: 2024-02-07 | End: 2024-02-07

## 2024-02-07 RX ORDER — ALBUTEROL SULFATE 2.5 MG/3ML
2.5 SOLUTION RESPIRATORY (INHALATION) ONCE
Status: DISCONTINUED | OUTPATIENT
Start: 2024-02-07 | End: 2024-02-08 | Stop reason: HOSPADM

## 2024-02-07 RX ORDER — INSULIN LISPRO 100 [IU]/ML
0-4 INJECTION, SOLUTION INTRAVENOUS; SUBCUTANEOUS NIGHTLY
Status: DISCONTINUED | OUTPATIENT
Start: 2024-02-07 | End: 2024-02-08 | Stop reason: HOSPADM

## 2024-02-07 RX ADMIN — INSULIN GLARGINE 15 UNITS: 100 INJECTION, SOLUTION SUBCUTANEOUS at 22:32

## 2024-02-07 RX ADMIN — ALBUTEROL SULFATE: 2.5 SOLUTION RESPIRATORY (INHALATION) at 18:51

## 2024-02-07 RX ADMIN — MORPHINE SULFATE 4 MG: 4 INJECTION, SOLUTION INTRAMUSCULAR; INTRAVENOUS at 18:50

## 2024-02-07 RX ADMIN — GABAPENTIN 900 MG: 300 CAPSULE ORAL at 22:30

## 2024-02-07 ASSESSMENT — PAIN SCALES - GENERAL
PAINLEVEL_OUTOF10: 6
PAINLEVEL_OUTOF10: 9
PAINLEVEL_OUTOF10: 9

## 2024-02-07 ASSESSMENT — PAIN DESCRIPTION - ONSET: ONSET: ON-GOING

## 2024-02-07 ASSESSMENT — PAIN DESCRIPTION - FREQUENCY: FREQUENCY: CONTINUOUS

## 2024-02-07 ASSESSMENT — PAIN DESCRIPTION - DESCRIPTORS: DESCRIPTORS: THROBBING

## 2024-02-07 ASSESSMENT — PAIN DESCRIPTION - LOCATION: LOCATION: ANKLE

## 2024-02-07 ASSESSMENT — PAIN - FUNCTIONAL ASSESSMENT
PAIN_FUNCTIONAL_ASSESSMENT: 0-10
PAIN_FUNCTIONAL_ASSESSMENT: PREVENTS OR INTERFERES WITH ALL ACTIVE AND SOME PASSIVE ACTIVITIES

## 2024-02-07 ASSESSMENT — PAIN DESCRIPTION - ORIENTATION: ORIENTATION: LEFT

## 2024-02-07 ASSESSMENT — PAIN DESCRIPTION - PAIN TYPE: TYPE: ACUTE PAIN

## 2024-02-07 NOTE — ED PROVIDER NOTES
Emergency Department Provider Note  Location: [facility] EMERGENCY DEPARTMENT  2/7/2024     Patient Identification  Sandor Early is a 68 y.o. male    Chief Complaint  Leg Swelling (Left ankle swelling. No known injury. Pt is supposed to wear 4 liters of O2 all the time but only had his tank in the waiting room set to 2 liters due to tank running out. Hx copd. )          HPI  (History provided by patient)  Patient is a 68-year-old male with a history of COPD diabetes with neuropathy, alcoholic liver cirrhosis, hypertension, gout, presents with left-sided ankle swelling and pain over the past 2 weeks.  Patient denies any injury but states that he had been drinking heavily the night before he noticed swelling in his ankle.  He reports he woke up with the symptoms of pain and swelling.  Otherwise no known injury.  No fevers chills nausea vomiting shortness of breath or pleurisy.      Nursing Notes were all reviewed and agreed with, or any disagreements were addressed in the HPI:  Allergies:   Allergies   Allergen Reactions    Lisinopril Swelling    Ace Inhibitors Swelling and Other (See Comments)    Influenza Vaccines Other (See Comments)     He states he was hospitalized when he received his first flu vaccine    Tiotropium Bromide Monohydrate Swelling and Other (See Comments)     Tolerates both tudorza and incruse    Vilanterol        Past medical history:  has a past medical history of Bladder outlet obstruction (03/05/2017), Carpal tunnel syndrome of left wrist (04/02/2013), Cellulitis of right lower extremity (03/27/2023), Cellulitis of right upper extremity (03/01/2023), COPD exacerbation (HCC) (03/02/2023), Cough syncope (01/10/2023), Diabetic ketoacidosis without coma associated with type 2 diabetes mellitus (HCC) (02/18/2018), GI bleed (05/08/2022), Gout (02/01/2013), Hilar adenopathy, Iron deficiency anemia, Malignant neoplasm of urinary bladder (HCC) (02/09/2016), Multiple duodenal ulcers, Other arterial

## 2024-02-08 ENCOUNTER — HOSPITAL ENCOUNTER (INPATIENT)
Age: 69
LOS: 6 days | Discharge: HOME HEALTH | DRG: 493 | End: 2024-02-14
Attending: INTERNAL MEDICINE | Admitting: HOSPITALIST
Payer: MEDICARE

## 2024-02-08 ENCOUNTER — APPOINTMENT (OUTPATIENT)
Dept: CT IMAGING | Age: 69
DRG: 493 | End: 2024-02-08
Attending: INTERNAL MEDICINE
Payer: MEDICARE

## 2024-02-08 ENCOUNTER — APPOINTMENT (OUTPATIENT)
Dept: GENERAL RADIOLOGY | Age: 69
DRG: 493 | End: 2024-02-08
Attending: INTERNAL MEDICINE
Payer: MEDICARE

## 2024-02-08 VITALS
SYSTOLIC BLOOD PRESSURE: 138 MMHG | RESPIRATION RATE: 20 BRPM | OXYGEN SATURATION: 97 % | HEART RATE: 95 BPM | TEMPERATURE: 98.5 F | DIASTOLIC BLOOD PRESSURE: 68 MMHG

## 2024-02-08 PROBLEM — S82.892A CLOSED LEFT ANKLE FRACTURE, INITIAL ENCOUNTER: Status: ACTIVE | Noted: 2024-02-08

## 2024-02-08 LAB
CRP SERPL-MCNC: 42.8 MG/L (ref 0–5.1)
GLUCOSE BLD-MCNC: 125 MG/DL (ref 70–99)
GLUCOSE BLD-MCNC: 154 MG/DL (ref 70–99)
GLUCOSE BLD-MCNC: 202 MG/DL (ref 70–99)
PERFORMED ON: ABNORMAL

## 2024-02-08 PROCEDURE — 1200000000 HC SEMI PRIVATE

## 2024-02-08 PROCEDURE — 6370000000 HC RX 637 (ALT 250 FOR IP): Performed by: HOSPITALIST

## 2024-02-08 PROCEDURE — 2700000000 HC OXYGEN THERAPY PER DAY

## 2024-02-08 PROCEDURE — 6360000002 HC RX W HCPCS: Performed by: STUDENT IN AN ORGANIZED HEALTH CARE EDUCATION/TRAINING PROGRAM

## 2024-02-08 PROCEDURE — 94640 AIRWAY INHALATION TREATMENT: CPT

## 2024-02-08 PROCEDURE — 2580000003 HC RX 258: Performed by: HOSPITALIST

## 2024-02-08 PROCEDURE — 71045 X-RAY EXAM CHEST 1 VIEW: CPT

## 2024-02-08 PROCEDURE — 94761 N-INVAS EAR/PLS OXIMETRY MLT: CPT

## 2024-02-08 PROCEDURE — 73700 CT LOWER EXTREMITY W/O DYE: CPT

## 2024-02-08 RX ORDER — OXYCODONE HYDROCHLORIDE AND ACETAMINOPHEN 5; 325 MG/1; MG/1
1 TABLET ORAL EVERY 4 HOURS PRN
Status: DISCONTINUED | OUTPATIENT
Start: 2024-02-08 | End: 2024-02-08

## 2024-02-08 RX ORDER — ONDANSETRON 2 MG/ML
4 INJECTION INTRAMUSCULAR; INTRAVENOUS EVERY 6 HOURS PRN
Status: DISCONTINUED | OUTPATIENT
Start: 2024-02-08 | End: 2024-02-14 | Stop reason: HOSPADM

## 2024-02-08 RX ORDER — MAGNESIUM SULFATE IN WATER 40 MG/ML
2000 INJECTION, SOLUTION INTRAVENOUS PRN
Status: DISCONTINUED | OUTPATIENT
Start: 2024-02-08 | End: 2024-02-14 | Stop reason: HOSPADM

## 2024-02-08 RX ORDER — FERROUS SULFATE 324(65)MG
325 TABLET, DELAYED RELEASE (ENTERIC COATED) ORAL
Status: DISCONTINUED | OUTPATIENT
Start: 2024-02-09 | End: 2024-02-14 | Stop reason: HOSPADM

## 2024-02-08 RX ORDER — ALBUTEROL SULFATE 2.5 MG/3ML
2.5 SOLUTION RESPIRATORY (INHALATION) EVERY 4 HOURS PRN
Status: DISCONTINUED | OUTPATIENT
Start: 2024-02-08 | End: 2024-02-08 | Stop reason: HOSPADM

## 2024-02-08 RX ORDER — DEXTROSE MONOHYDRATE 100 MG/ML
INJECTION, SOLUTION INTRAVENOUS CONTINUOUS PRN
Status: DISCONTINUED | OUTPATIENT
Start: 2024-02-08 | End: 2024-02-08 | Stop reason: SDUPTHER

## 2024-02-08 RX ORDER — PREDNISONE 20 MG/1
40 TABLET ORAL DAILY
Status: DISPENSED | OUTPATIENT
Start: 2024-02-08 | End: 2024-02-13

## 2024-02-08 RX ORDER — GABAPENTIN 300 MG/1
900 CAPSULE ORAL 3 TIMES DAILY
Status: DISCONTINUED | OUTPATIENT
Start: 2024-02-08 | End: 2024-02-14 | Stop reason: HOSPADM

## 2024-02-08 RX ORDER — INSULIN GLARGINE 100 [IU]/ML
18 INJECTION, SOLUTION SUBCUTANEOUS NIGHTLY
Status: DISCONTINUED | OUTPATIENT
Start: 2024-02-08 | End: 2024-02-14 | Stop reason: HOSPADM

## 2024-02-08 RX ORDER — DULOXETIN HYDROCHLORIDE 60 MG/1
60 CAPSULE, DELAYED RELEASE ORAL DAILY
Status: DISCONTINUED | OUTPATIENT
Start: 2024-02-08 | End: 2024-02-14 | Stop reason: HOSPADM

## 2024-02-08 RX ORDER — SODIUM CHLORIDE 0.9 % (FLUSH) 0.9 %
5-40 SYRINGE (ML) INJECTION PRN
Status: DISCONTINUED | OUTPATIENT
Start: 2024-02-08 | End: 2024-02-14 | Stop reason: HOSPADM

## 2024-02-08 RX ORDER — ONDANSETRON 4 MG/1
4 TABLET, ORALLY DISINTEGRATING ORAL EVERY 8 HOURS PRN
Status: DISCONTINUED | OUTPATIENT
Start: 2024-02-08 | End: 2024-02-14 | Stop reason: HOSPADM

## 2024-02-08 RX ORDER — OXYCODONE HYDROCHLORIDE AND ACETAMINOPHEN 5; 325 MG/1; MG/1
2 TABLET ORAL EVERY 4 HOURS PRN
Status: DISCONTINUED | OUTPATIENT
Start: 2024-02-08 | End: 2024-02-08

## 2024-02-08 RX ORDER — POTASSIUM CHLORIDE 7.45 MG/ML
10 INJECTION INTRAVENOUS PRN
Status: DISCONTINUED | OUTPATIENT
Start: 2024-02-08 | End: 2024-02-14 | Stop reason: HOSPADM

## 2024-02-08 RX ORDER — OXYCODONE HYDROCHLORIDE 5 MG/1
5 TABLET ORAL EVERY 4 HOURS PRN
Status: DISCONTINUED | OUTPATIENT
Start: 2024-02-08 | End: 2024-02-08

## 2024-02-08 RX ORDER — GLUCAGON 1 MG/ML
1 KIT INJECTION PRN
Status: DISCONTINUED | OUTPATIENT
Start: 2024-02-08 | End: 2024-02-14 | Stop reason: HOSPADM

## 2024-02-08 RX ORDER — ACETAMINOPHEN 325 MG/1
650 TABLET ORAL EVERY 6 HOURS PRN
Status: DISCONTINUED | OUTPATIENT
Start: 2024-02-08 | End: 2024-02-14 | Stop reason: HOSPADM

## 2024-02-08 RX ORDER — IPRATROPIUM BROMIDE AND ALBUTEROL SULFATE 2.5; .5 MG/3ML; MG/3ML
1 SOLUTION RESPIRATORY (INHALATION) EVERY 4 HOURS PRN
Status: DISCONTINUED | OUTPATIENT
Start: 2024-02-08 | End: 2024-02-08

## 2024-02-08 RX ORDER — POLYETHYLENE GLYCOL 3350 17 G/17G
17 POWDER, FOR SOLUTION ORAL DAILY PRN
Status: DISCONTINUED | OUTPATIENT
Start: 2024-02-08 | End: 2024-02-11

## 2024-02-08 RX ORDER — ALBUTEROL SULFATE 2.5 MG/3ML
2.5 SOLUTION RESPIRATORY (INHALATION) ONCE
Status: COMPLETED | OUTPATIENT
Start: 2024-02-08 | End: 2024-02-08

## 2024-02-08 RX ORDER — DEXTROSE MONOHYDRATE 100 MG/ML
INJECTION, SOLUTION INTRAVENOUS CONTINUOUS PRN
Status: DISCONTINUED | OUTPATIENT
Start: 2024-02-08 | End: 2024-02-14 | Stop reason: HOSPADM

## 2024-02-08 RX ORDER — ENOXAPARIN SODIUM 100 MG/ML
40 INJECTION SUBCUTANEOUS DAILY
Status: DISCONTINUED | OUTPATIENT
Start: 2024-02-10 | End: 2024-02-12 | Stop reason: DRUGHIGH

## 2024-02-08 RX ORDER — INSULIN LISPRO 100 [IU]/ML
0-4 INJECTION, SOLUTION INTRAVENOUS; SUBCUTANEOUS NIGHTLY
Status: DISCONTINUED | OUTPATIENT
Start: 2024-02-08 | End: 2024-02-14 | Stop reason: HOSPADM

## 2024-02-08 RX ORDER — MORPHINE SULFATE 2 MG/ML
2 INJECTION, SOLUTION INTRAMUSCULAR; INTRAVENOUS EVERY 4 HOURS PRN
Status: DISCONTINUED | OUTPATIENT
Start: 2024-02-08 | End: 2024-02-14 | Stop reason: HOSPADM

## 2024-02-08 RX ORDER — GLUCAGON 1 MG/ML
1 KIT INJECTION PRN
Status: DISCONTINUED | OUTPATIENT
Start: 2024-02-08 | End: 2024-02-08 | Stop reason: SDUPTHER

## 2024-02-08 RX ORDER — OXYCODONE HYDROCHLORIDE 10 MG/1
10 TABLET ORAL 4 TIMES DAILY
Status: DISCONTINUED | OUTPATIENT
Start: 2024-02-08 | End: 2024-02-14 | Stop reason: HOSPADM

## 2024-02-08 RX ORDER — ATORVASTATIN CALCIUM 40 MG/1
40 TABLET, FILM COATED ORAL DAILY
Status: DISCONTINUED | OUTPATIENT
Start: 2024-02-08 | End: 2024-02-14 | Stop reason: HOSPADM

## 2024-02-08 RX ORDER — FOLIC ACID 1 MG/1
1000 TABLET ORAL DAILY
Status: DISCONTINUED | OUTPATIENT
Start: 2024-02-08 | End: 2024-02-14 | Stop reason: HOSPADM

## 2024-02-08 RX ORDER — POTASSIUM CHLORIDE 20 MEQ/1
40 TABLET, EXTENDED RELEASE ORAL PRN
Status: DISCONTINUED | OUTPATIENT
Start: 2024-02-08 | End: 2024-02-14 | Stop reason: HOSPADM

## 2024-02-08 RX ORDER — SODIUM CHLORIDE 0.9 % (FLUSH) 0.9 %
5-40 SYRINGE (ML) INJECTION EVERY 12 HOURS SCHEDULED
Status: DISCONTINUED | OUTPATIENT
Start: 2024-02-08 | End: 2024-02-14 | Stop reason: HOSPADM

## 2024-02-08 RX ORDER — METOPROLOL SUCCINATE 50 MG/1
50 TABLET, EXTENDED RELEASE ORAL DAILY
Status: DISCONTINUED | OUTPATIENT
Start: 2024-02-08 | End: 2024-02-14 | Stop reason: HOSPADM

## 2024-02-08 RX ORDER — IPRATROPIUM BROMIDE AND ALBUTEROL SULFATE 2.5; .5 MG/3ML; MG/3ML
1 SOLUTION RESPIRATORY (INHALATION)
Status: DISCONTINUED | OUTPATIENT
Start: 2024-02-08 | End: 2024-02-14 | Stop reason: HOSPADM

## 2024-02-08 RX ORDER — PANTOPRAZOLE SODIUM 40 MG/1
40 TABLET, DELAYED RELEASE ORAL
Status: DISCONTINUED | OUTPATIENT
Start: 2024-02-09 | End: 2024-02-14 | Stop reason: HOSPADM

## 2024-02-08 RX ORDER — DILTIAZEM HYDROCHLORIDE 180 MG/1
180 CAPSULE, COATED, EXTENDED RELEASE ORAL DAILY
Status: DISCONTINUED | OUTPATIENT
Start: 2024-02-08 | End: 2024-02-14 | Stop reason: HOSPADM

## 2024-02-08 RX ORDER — SODIUM CHLORIDE 9 MG/ML
INJECTION, SOLUTION INTRAVENOUS PRN
Status: DISCONTINUED | OUTPATIENT
Start: 2024-02-08 | End: 2024-02-14 | Stop reason: HOSPADM

## 2024-02-08 RX ORDER — INSULIN LISPRO 100 [IU]/ML
0-8 INJECTION, SOLUTION INTRAVENOUS; SUBCUTANEOUS
Status: DISCONTINUED | OUTPATIENT
Start: 2024-02-08 | End: 2024-02-14 | Stop reason: HOSPADM

## 2024-02-08 RX ORDER — ALLOPURINOL 300 MG/1
300 TABLET ORAL DAILY
Status: DISCONTINUED | OUTPATIENT
Start: 2024-02-08 | End: 2024-02-14 | Stop reason: HOSPADM

## 2024-02-08 RX ORDER — ACETAMINOPHEN 650 MG/1
650 SUPPOSITORY RECTAL EVERY 6 HOURS PRN
Status: DISCONTINUED | OUTPATIENT
Start: 2024-02-08 | End: 2024-02-14 | Stop reason: HOSPADM

## 2024-02-08 RX ORDER — ALBUTEROL SULFATE 2.5 MG/3ML
2.5 SOLUTION RESPIRATORY (INHALATION) ONCE
Status: DISCONTINUED | OUTPATIENT
Start: 2024-02-08 | End: 2024-02-08 | Stop reason: HOSPADM

## 2024-02-08 RX ADMIN — OXYCODONE HYDROCHLORIDE 10 MG: 10 TABLET ORAL at 16:33

## 2024-02-08 RX ADMIN — METOPROLOL SUCCINATE 50 MG: 50 TABLET, EXTENDED RELEASE ORAL at 16:39

## 2024-02-08 RX ADMIN — ONDANSETRON 4 MG: 4 TABLET, ORALLY DISINTEGRATING ORAL at 14:48

## 2024-02-08 RX ADMIN — DILTIAZEM HYDROCHLORIDE 180 MG: 180 CAPSULE, COATED, EXTENDED RELEASE ORAL at 16:39

## 2024-02-08 RX ADMIN — ALLOPURINOL 300 MG: 300 TABLET ORAL at 16:31

## 2024-02-08 RX ADMIN — GABAPENTIN 900 MG: 300 CAPSULE ORAL at 20:57

## 2024-02-08 RX ADMIN — MOMETASONE FUROATE AND FORMOTEROL FUMARATE DIHYDRATE 2 PUFF: 200; 5 AEROSOL RESPIRATORY (INHALATION) at 19:51

## 2024-02-08 RX ADMIN — ATORVASTATIN CALCIUM 40 MG: 40 TABLET, FILM COATED ORAL at 16:31

## 2024-02-08 RX ADMIN — IPRATROPIUM BROMIDE AND ALBUTEROL SULFATE 1 DOSE: 2.5; .5 SOLUTION RESPIRATORY (INHALATION) at 19:51

## 2024-02-08 RX ADMIN — GABAPENTIN 900 MG: 300 CAPSULE ORAL at 14:44

## 2024-02-08 RX ADMIN — IPRATROPIUM BROMIDE AND ALBUTEROL SULFATE 1 DOSE: 2.5; .5 SOLUTION RESPIRATORY (INHALATION) at 15:40

## 2024-02-08 RX ADMIN — OXYCODONE HYDROCHLORIDE 10 MG: 10 TABLET ORAL at 20:58

## 2024-02-08 RX ADMIN — PREDNISONE 40 MG: 20 TABLET ORAL at 16:31

## 2024-02-08 RX ADMIN — SODIUM CHLORIDE, PRESERVATIVE FREE 10 ML: 5 INJECTION INTRAVENOUS at 20:59

## 2024-02-08 RX ADMIN — POLYETHYLENE GLYCOL 3350 17 G: 17 POWDER, FOR SOLUTION ORAL at 14:48

## 2024-02-08 RX ADMIN — DULOXETINE HYDROCHLORIDE 60 MG: 60 CAPSULE, DELAYED RELEASE ORAL at 16:39

## 2024-02-08 RX ADMIN — INSULIN GLARGINE 18 UNITS: 100 INJECTION, SOLUTION SUBCUTANEOUS at 20:58

## 2024-02-08 RX ADMIN — FOLIC ACID 1000 MCG: 1 TABLET ORAL at 16:31

## 2024-02-08 RX ADMIN — ALBUTEROL SULFATE 2.5 MG: 2.5 SOLUTION RESPIRATORY (INHALATION) at 01:52

## 2024-02-08 RX ADMIN — IPRATROPIUM BROMIDE AND ALBUTEROL SULFATE 1 DOSE: 2.5; .5 SOLUTION RESPIRATORY (INHALATION) at 13:16

## 2024-02-08 NOTE — ED NOTES
Pt breathing much easier at this time.  No longer any audible wheezing.  Pt denies any other needs at this time.

## 2024-02-08 NOTE — ED NOTES
Pt sitting on side of bed using urinal at this time with oxygen removed.  Pt oxygen saturation of 74%.  Pt assisted back up into bed and placed back on oxygen.  Pt given additional breathing treatment at this time.

## 2024-02-08 NOTE — ED NOTES
Pt given something to eat and drink upon request and verbal permission from Dr. Trent.  Pt notified he is allowed to eat and drink until midnight.

## 2024-02-08 NOTE — PLAN OF CARE
[facility] Orthopedic Surgery  Consult Note          Orthopedic Consult, full note to follow at [facility].    Sandor ZAMBRANO Sharath 68 y.o. seen at [facility] ED for left ankle fracture subluxation.    Xray reviewed, and showed left ankle fracture subluxation.    Plan:  - Transfer to [place]  - Will need surgical treatment of his left ankle fracture.  - Surgery once medically stable, possible Friday    Thank you very much for the kind consultation and allowing me to participate in this patient's care.  I will continue to keep you apprised of his progress.         Sudeep Ruth MD, 2/7/2024 9:59 PM

## 2024-02-08 NOTE — CONSULTS
[facility] Orthopedic Surgery  Consult Note    This patient is seen in consultation at the request of Dr Dodd    Reason for Consult:  left ankle fracture    CHIEF COMPLAINT:  left ankle pain    History Obtained From:  patient, electronic medical record    HISTORY OF PRESENT ILLNESS:    The patient is a 68 y.o. male who presents with left ankle pain. He does not recall a fall or injury but he reports neuropathy of feet . States he went to bed and got back up last night with left ankle pain and his brother took him to ER. He was noted with left ankle fx. He was transferred from [facility] to [facility] at Dr Ruth's request. Pain is described in left ankle and with the intensity of moderate. Pain is described as aching. Discomfort is intermittent. No other complaints. Hx of gout . Hx of ETOH use.     Past Medical History:        Diagnosis Date    Bladder outlet obstruction 03/05/2017    Carpal tunnel syndrome of left wrist 04/02/2013    Cellulitis of right lower extremity 03/27/2023    Cellulitis of right upper extremity 03/01/2023    w/ Group A Strep bacteremia    COPD exacerbation (HCC) 03/02/2023    Cough syncope 01/10/2023    Diabetic ketoacidosis without coma associated with type 2 diabetes mellitus (HCC) 02/18/2018    GI bleed 05/08/2022    Gout 02/01/2013    Hilar adenopathy     Iron deficiency anemia     Malignant neoplasm of urinary bladder (HCC) 02/09/2016    Multiple duodenal ulcers     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

## 2024-02-08 NOTE — H&P
2/7/2024  EXAMINATION: 2 XRAY VIEWS OF THE LEFT TIBIA AND FIBULA 2/7/2024 4:24 pm COMPARISON: None. HISTORY: ORDERING SYSTEM PROVIDED HISTORY: pain TECHNOLOGIST PROVIDED HISTORY: Reason for exam:->pain Reason for Exam: leg pain and swelling. no trauma FINDINGS: Proximal tibia and fibula are unremarkable.  No fracture or dislocation. Lateral view of the knee is suboptimal.  Vascular calcifications.  Ankle be discussed on a separate report.     No proximal fracture or dislocation         Electronically signed by Rayshawn Dodd MD on 2/8/2024 at 2:09 PM

## 2024-02-08 NOTE — ED NOTES
Pt requested and was given third pillow for comfort.  Notified pt that I spoke with physician regarding his night time medications and will be putting order in shortly.

## 2024-02-08 NOTE — PLAN OF CARE
[facility] Hospitalist brief note  Consult received.  Case reviewed with ER physician  67 yo m alcoholic transferred from [facility] for L ankle fx.  Full note to follow.    Cooper Martinez MD    Thanks  Ashanti Villafuerte MD

## 2024-02-08 NOTE — CARE COORDINATION
Case Management Assessment  Initial Evaluation    Date/Time of Evaluation: 2/8/2024 3:46 PM  Assessment Completed by: NARENDRA Casarez    If patient is discharged prior to next notation, then this note serves as note for discharge by case management.    Patient Name: Sandor Early                   YOB: 1955  Diagnosis: Closed left ankle fracture, initial encounter [S82.892A]                   Date / Time: 2/8/2024 10:40 AM    Patient Admission Status: Inpatient   Readmission Risk (Low < 19, Mod (19-27), High > 27): Readmission Risk Score: 16.6    Current PCP: Cooper Martinez MD  PCP verified by CM? Yes    Chart Reviewed: Yes      History Provided by: Patient, Medical Record  Patient Orientation: Alert and Oriented    Patient Cognition: Alert    Hospitalization in the last 30 days (Readmission):  No    If yes, Readmission Assessment in CM Navigator will be completed.    Advance Directives:      Code Status: Full Code   Patient's Primary Decision Maker is: Legal Next of Kin    Primary Decision Maker: Sharath, - Brother/Sister - [phone]    Discharge Planning:    Patient lives with: Children (daughter and two teenage grandchildren) Type of Home: Trailer/Mobile Home (3 steps to enter)  Primary Care Giver: Self  Patient Support Systems include: Family Members, Children   Current Financial resources: Medicare  Current community resources: None  Current services prior to admission: Oxygen Therapy, Durable Medical Equipment ([employer] for O2)            Current DME: Cane, Walker (rolling)            Type of Home Care services:  None    ADLS  Prior functional level: Independent in ADLs/IADLs  Current functional level: Assistance with the following:, Housework, Mobility    PT AM-PAC:   /24  OT AM-PAC:   /24    Family can provide assistance at DC: Yes  Would you like Case Management to discuss the discharge plan with any other family members/significant others, and if so, who? No  Plans to

## 2024-02-08 NOTE — ED NOTES
Pt notified that he will not get bed tonight will have to wait until sometime tomorrow.  Pt given 2 pillows for comfort and blanket so he can try and get some sleep.  Pt also requested his night time meds as well.  Explained to pt that I would speak with physician.

## 2024-02-08 NOTE — ED NOTES
Pt given night time medications at this time.  Also informed Dr. Trent that pt is very wheezy and could use another breathing treatment.  Orders placed and pt receiving treatment at this time.

## 2024-02-09 PROBLEM — S93.432A SYNDESMOTIC DISRUPTION OF LEFT ANKLE: Status: ACTIVE | Noted: 2024-02-09

## 2024-02-09 PROBLEM — F17.200 CURRENT SMOKER: Status: ACTIVE | Noted: 2022-07-11

## 2024-02-09 LAB
ANION GAP SERPL CALCULATED.3IONS-SCNC: 12 MMOL/L (ref 3–16)
BASOPHILS # BLD: 0 K/UL (ref 0–0.2)
BASOPHILS NFR BLD: 0.2 %
BUN SERPL-MCNC: 11 MG/DL (ref 7–20)
CALCIUM SERPL-MCNC: 9.3 MG/DL (ref 8.3–10.6)
CHLORIDE SERPL-SCNC: 93 MMOL/L (ref 99–110)
CO2 SERPL-SCNC: 28 MMOL/L (ref 21–32)
CREAT SERPL-MCNC: 0.7 MG/DL (ref 0.8–1.3)
DEPRECATED RDW RBC AUTO: 14.5 % (ref 12.4–15.4)
EOSINOPHIL # BLD: 0 K/UL (ref 0–0.6)
EOSINOPHIL NFR BLD: 0 %
EST. AVERAGE GLUCOSE BLD GHB EST-MCNC: 134.1 MG/DL
GFR SERPLBLD CREATININE-BSD FMLA CKD-EPI: >60 ML/MIN/{1.73_M2}
GLUCOSE BLD-MCNC: 133 MG/DL (ref 70–99)
GLUCOSE BLD-MCNC: 135 MG/DL (ref 70–99)
GLUCOSE BLD-MCNC: 182 MG/DL (ref 70–99)
GLUCOSE BLD-MCNC: 199 MG/DL (ref 70–99)
GLUCOSE BLD-MCNC: 220 MG/DL (ref 70–99)
GLUCOSE SERPL-MCNC: 144 MG/DL (ref 70–99)
HBA1C MFR BLD: 6.3 %
HCT VFR BLD AUTO: 43.7 % (ref 40.5–52.5)
HGB BLD-MCNC: 14.8 G/DL (ref 13.5–17.5)
LYMPHOCYTES # BLD: 1 K/UL (ref 1–5.1)
LYMPHOCYTES NFR BLD: 11.6 %
MCH RBC QN AUTO: 31.2 PG (ref 26–34)
MCHC RBC AUTO-ENTMCNC: 33.8 G/DL (ref 31–36)
MCV RBC AUTO: 92.2 FL (ref 80–100)
MONOCYTES # BLD: 0.4 K/UL (ref 0–1.3)
MONOCYTES NFR BLD: 4.9 %
NEUTROPHILS # BLD: 6.9 K/UL (ref 1.7–7.7)
NEUTROPHILS NFR BLD: 83.3 %
PERFORMED ON: ABNORMAL
PLATELET # BLD AUTO: 201 K/UL (ref 135–450)
PMV BLD AUTO: 7.5 FL (ref 5–10.5)
POTASSIUM SERPL-SCNC: 4.3 MMOL/L (ref 3.5–5.1)
RBC # BLD AUTO: 4.74 M/UL (ref 4.2–5.9)
SODIUM SERPL-SCNC: 133 MMOL/L (ref 136–145)
WBC # BLD AUTO: 8.3 K/UL (ref 4–11)

## 2024-02-09 PROCEDURE — 99406 BEHAV CHNG SMOKING 3-10 MIN: CPT | Performed by: ORTHOPAEDIC SURGERY

## 2024-02-09 PROCEDURE — 83036 HEMOGLOBIN GLYCOSYLATED A1C: CPT

## 2024-02-09 PROCEDURE — 36415 COLL VENOUS BLD VENIPUNCTURE: CPT

## 2024-02-09 PROCEDURE — 2700000000 HC OXYGEN THERAPY PER DAY

## 2024-02-09 PROCEDURE — 6360000002 HC RX W HCPCS: Performed by: HOSPITALIST

## 2024-02-09 PROCEDURE — 85025 COMPLETE CBC W/AUTO DIFF WBC: CPT

## 2024-02-09 PROCEDURE — 94640 AIRWAY INHALATION TREATMENT: CPT

## 2024-02-09 PROCEDURE — 51798 US URINE CAPACITY MEASURE: CPT

## 2024-02-09 PROCEDURE — 1200000000 HC SEMI PRIVATE

## 2024-02-09 PROCEDURE — 99223 1ST HOSP IP/OBS HIGH 75: CPT | Performed by: ORTHOPAEDIC SURGERY

## 2024-02-09 PROCEDURE — 6370000000 HC RX 637 (ALT 250 FOR IP): Performed by: HOSPITALIST

## 2024-02-09 PROCEDURE — 94760 N-INVAS EAR/PLS OXIMETRY 1: CPT

## 2024-02-09 PROCEDURE — 2580000003 HC RX 258: Performed by: HOSPITALIST

## 2024-02-09 PROCEDURE — 80048 BASIC METABOLIC PNL TOTAL CA: CPT

## 2024-02-09 RX ADMIN — DULOXETINE HYDROCHLORIDE 60 MG: 60 CAPSULE, DELAYED RELEASE ORAL at 10:03

## 2024-02-09 RX ADMIN — ATORVASTATIN CALCIUM 40 MG: 40 TABLET, FILM COATED ORAL at 10:03

## 2024-02-09 RX ADMIN — GABAPENTIN 900 MG: 300 CAPSULE ORAL at 10:03

## 2024-02-09 RX ADMIN — OXYCODONE HYDROCHLORIDE 10 MG: 10 TABLET ORAL at 10:05

## 2024-02-09 RX ADMIN — IPRATROPIUM BROMIDE AND ALBUTEROL SULFATE 1 DOSE: 2.5; .5 SOLUTION RESPIRATORY (INHALATION) at 19:10

## 2024-02-09 RX ADMIN — MOMETASONE FUROATE AND FORMOTEROL FUMARATE DIHYDRATE 2 PUFF: 200; 5 AEROSOL RESPIRATORY (INHALATION) at 08:31

## 2024-02-09 RX ADMIN — MOMETASONE FUROATE AND FORMOTEROL FUMARATE DIHYDRATE 2 PUFF: 200; 5 AEROSOL RESPIRATORY (INHALATION) at 19:10

## 2024-02-09 RX ADMIN — OXYCODONE HYDROCHLORIDE 10 MG: 10 TABLET ORAL at 17:14

## 2024-02-09 RX ADMIN — PREDNISONE 40 MG: 20 TABLET ORAL at 10:03

## 2024-02-09 RX ADMIN — SODIUM CHLORIDE, PRESERVATIVE FREE 10 ML: 5 INJECTION INTRAVENOUS at 10:04

## 2024-02-09 RX ADMIN — ALLOPURINOL 300 MG: 300 TABLET ORAL at 10:03

## 2024-02-09 RX ADMIN — INSULIN LISPRO 2 UNITS: 100 INJECTION, SOLUTION INTRAVENOUS; SUBCUTANEOUS at 17:14

## 2024-02-09 RX ADMIN — IPRATROPIUM BROMIDE AND ALBUTEROL SULFATE 1 DOSE: 2.5; .5 SOLUTION RESPIRATORY (INHALATION) at 12:21

## 2024-02-09 RX ADMIN — FERROUS SULFATE TAB EC 324 MG (65 MG FE EQUIVALENT) 325 MG: 324 (65 FE) TABLET DELAYED RESPONSE at 10:03

## 2024-02-09 RX ADMIN — OXYCODONE HYDROCHLORIDE 10 MG: 10 TABLET ORAL at 14:47

## 2024-02-09 RX ADMIN — METOPROLOL SUCCINATE 50 MG: 50 TABLET, EXTENDED RELEASE ORAL at 10:03

## 2024-02-09 RX ADMIN — IPRATROPIUM BROMIDE AND ALBUTEROL SULFATE 1 DOSE: 2.5; .5 SOLUTION RESPIRATORY (INHALATION) at 16:04

## 2024-02-09 RX ADMIN — GABAPENTIN 900 MG: 300 CAPSULE ORAL at 14:47

## 2024-02-09 RX ADMIN — OXYCODONE HYDROCHLORIDE 10 MG: 10 TABLET ORAL at 20:26

## 2024-02-09 RX ADMIN — SODIUM CHLORIDE, PRESERVATIVE FREE 10 ML: 5 INJECTION INTRAVENOUS at 20:27

## 2024-02-09 RX ADMIN — DILTIAZEM HYDROCHLORIDE 180 MG: 180 CAPSULE, COATED, EXTENDED RELEASE ORAL at 10:03

## 2024-02-09 RX ADMIN — IPRATROPIUM BROMIDE AND ALBUTEROL SULFATE 1 DOSE: 2.5; .5 SOLUTION RESPIRATORY (INHALATION) at 08:31

## 2024-02-09 RX ADMIN — FOLIC ACID 1000 MCG: 1 TABLET ORAL at 10:03

## 2024-02-09 RX ADMIN — MORPHINE SULFATE 2 MG: 2 INJECTION, SOLUTION INTRAMUSCULAR; INTRAVENOUS at 05:18

## 2024-02-09 RX ADMIN — GABAPENTIN 900 MG: 300 CAPSULE ORAL at 20:26

## 2024-02-09 RX ADMIN — POLYETHYLENE GLYCOL 3350 17 G: 17 POWDER, FOR SOLUTION ORAL at 10:11

## 2024-02-09 RX ADMIN — INSULIN GLARGINE 18 UNITS: 100 INJECTION, SOLUTION SUBCUTANEOUS at 20:26

## 2024-02-09 NOTE — CONSULTS
[facility] Orthopedic Surgery  Consult Note         This patient is seen in consultation at the request of Dr Echols, Inderjit ZAMBRANO MD    Reason for Consult:  Left ankle pain / ankle fracture dislocation with syndesmosis disruption.    CHIEF COMPLAINT:  Left ankle pain / ankle fracture dislocation with syndesmosis disruption.    History Obtained From:  patient, electronic medical record    HISTORY OF PRESENT ILLNESS:    Mr. Early 68 y.o.  male seen today at [facility] for consultation and evaluation of a left ankle pain with no known injury which started  over a week ago. He has DM and neuropathy. He was first seen and evaluated in [facility], and was walking on his ankle, where he was x-rayed, splinted and I was consulted. He was then transferred to [facility]. He is complaining of medial & lateral ankle pain and swelling 5/10. This is better with elevation and worse with bearing any wt. The pain is sharp and not radiating. No numbness or tingling sensation. Alleviating factors: elevation and rest. No other complaint. He is a smoker.    Past Medical History:        Diagnosis Date    Bladder outlet obstruction 03/05/2017    Carpal tunnel syndrome of left wrist 04/02/2013    Cellulitis of right lower extremity 03/27/2023    Cellulitis of right upper extremity 03/01/2023    w/ Group A Strep bacteremia    COPD exacerbation (HCC) 03/02/2023    Cough syncope 01/10/2023    Diabetic ketoacidosis without coma associated with type 2 diabetes mellitus (HCC) 02/18/2018    GI bleed 05/08/2022    Gout 02/01/2013    Hilar adenopathy     Iron deficiency anemia     Malignant neoplasm of urinary bladder (HCC) 02/09/2016    Multiple duodenal ulcers     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

## 2024-02-09 NOTE — PLAN OF CARE
Problem: Chronic Conditions and Co-morbidities  Goal: Patient's chronic conditions and co-morbidity symptoms are monitored and maintained or improved  Outcome: Progressing  Flowsheets (Taken 2/9/2024 1734)  Care Plan - Patient's Chronic Conditions and Co-Morbidity Symptoms are Monitored and Maintained or Improved:   Monitor and assess patient's chronic conditions and comorbid symptoms for stability, deterioration, or improvement   Collaborate with multidisciplinary team to address chronic and comorbid conditions and prevent exacerbation or deterioration   Update acute care plan with appropriate goals if chronic or comorbid symptoms are exacerbated and prevent overall improvement and discharge     Problem: Discharge Planning  Goal: Discharge to home or other facility with appropriate resources  Outcome: Progressing  Flowsheets (Taken 2/9/2024 1734)  Discharge to home or other facility with appropriate resources:   Identify barriers to discharge with patient and caregiver   Identify discharge learning needs (meds, wound care, etc)   Refer to discharge planning if patient needs post-hospital services based on physician order or complex needs related to functional status, cognitive ability or social support system   Arrange for needed discharge resources and transportation as appropriate     Problem: Safety - Adult  Goal: Free from fall injury  Outcome: Progressing  Flowsheets (Taken 2/9/2024 1734)  Free From Fall Injury: Instruct family/caregiver on patient safety     Problem: Pain  Goal: Verbalizes/displays adequate comfort level or baseline comfort level  Outcome: Progressing  Flowsheets (Taken 2/9/2024 1734)  Verbalizes/displays adequate comfort level or baseline comfort level:   Encourage patient to monitor pain and request assistance   Administer analgesics based on type and severity of pain and evaluate response   Consider cultural and social influences on pain and pain management   Assess pain using

## 2024-02-10 LAB
GLUCOSE BLD-MCNC: 138 MG/DL (ref 70–99)
GLUCOSE BLD-MCNC: 145 MG/DL (ref 70–99)
GLUCOSE BLD-MCNC: 196 MG/DL (ref 70–99)
GLUCOSE BLD-MCNC: 233 MG/DL (ref 70–99)
PERFORMED ON: ABNORMAL

## 2024-02-10 PROCEDURE — 2580000003 HC RX 258: Performed by: HOSPITALIST

## 2024-02-10 PROCEDURE — 6360000002 HC RX W HCPCS: Performed by: NURSE PRACTITIONER

## 2024-02-10 PROCEDURE — 94761 N-INVAS EAR/PLS OXIMETRY MLT: CPT

## 2024-02-10 PROCEDURE — 6370000000 HC RX 637 (ALT 250 FOR IP): Performed by: HOSPITALIST

## 2024-02-10 PROCEDURE — 2700000000 HC OXYGEN THERAPY PER DAY

## 2024-02-10 PROCEDURE — 1200000000 HC SEMI PRIVATE

## 2024-02-10 PROCEDURE — 94640 AIRWAY INHALATION TREATMENT: CPT

## 2024-02-10 PROCEDURE — 6360000002 HC RX W HCPCS: Performed by: HOSPITALIST

## 2024-02-10 RX ORDER — ALBUTEROL SULFATE 2.5 MG/3ML
2.5 SOLUTION RESPIRATORY (INHALATION) EVERY 4 HOURS PRN
Status: DISCONTINUED | OUTPATIENT
Start: 2024-02-10 | End: 2024-02-14 | Stop reason: HOSPADM

## 2024-02-10 RX ADMIN — GABAPENTIN 900 MG: 300 CAPSULE ORAL at 12:18

## 2024-02-10 RX ADMIN — DULOXETINE HYDROCHLORIDE 60 MG: 60 CAPSULE, DELAYED RELEASE ORAL at 09:24

## 2024-02-10 RX ADMIN — OXYCODONE HYDROCHLORIDE 10 MG: 10 TABLET ORAL at 12:18

## 2024-02-10 RX ADMIN — IPRATROPIUM BROMIDE AND ALBUTEROL SULFATE 1 DOSE: 2.5; .5 SOLUTION RESPIRATORY (INHALATION) at 16:22

## 2024-02-10 RX ADMIN — ALBUTEROL SULFATE 2.5 MG: 2.5 SOLUTION RESPIRATORY (INHALATION) at 02:18

## 2024-02-10 RX ADMIN — PANTOPRAZOLE SODIUM 40 MG: 40 TABLET, DELAYED RELEASE ORAL at 06:13

## 2024-02-10 RX ADMIN — GABAPENTIN 900 MG: 300 CAPSULE ORAL at 09:26

## 2024-02-10 RX ADMIN — FERROUS SULFATE TAB EC 324 MG (65 MG FE EQUIVALENT) 325 MG: 324 (65 FE) TABLET DELAYED RESPONSE at 09:25

## 2024-02-10 RX ADMIN — ENOXAPARIN SODIUM 40 MG: 100 INJECTION SUBCUTANEOUS at 09:24

## 2024-02-10 RX ADMIN — FOLIC ACID 1000 MCG: 1 TABLET ORAL at 09:25

## 2024-02-10 RX ADMIN — IPRATROPIUM BROMIDE AND ALBUTEROL SULFATE 1 DOSE: 2.5; .5 SOLUTION RESPIRATORY (INHALATION) at 19:36

## 2024-02-10 RX ADMIN — MOMETASONE FUROATE AND FORMOTEROL FUMARATE DIHYDRATE 2 PUFF: 200; 5 AEROSOL RESPIRATORY (INHALATION) at 07:54

## 2024-02-10 RX ADMIN — PREDNISONE 40 MG: 20 TABLET ORAL at 09:26

## 2024-02-10 RX ADMIN — METOPROLOL SUCCINATE 50 MG: 50 TABLET, EXTENDED RELEASE ORAL at 09:00

## 2024-02-10 RX ADMIN — SODIUM CHLORIDE, PRESERVATIVE FREE 10 ML: 5 INJECTION INTRAVENOUS at 21:23

## 2024-02-10 RX ADMIN — IPRATROPIUM BROMIDE AND ALBUTEROL SULFATE 1 DOSE: 2.5; .5 SOLUTION RESPIRATORY (INHALATION) at 12:00

## 2024-02-10 RX ADMIN — POLYETHYLENE GLYCOL 3350 17 G: 17 POWDER, FOR SOLUTION ORAL at 11:05

## 2024-02-10 RX ADMIN — OXYCODONE HYDROCHLORIDE 10 MG: 10 TABLET ORAL at 17:48

## 2024-02-10 RX ADMIN — ATORVASTATIN CALCIUM 40 MG: 40 TABLET, FILM COATED ORAL at 09:25

## 2024-02-10 RX ADMIN — MOMETASONE FUROATE AND FORMOTEROL FUMARATE DIHYDRATE 2 PUFF: 200; 5 AEROSOL RESPIRATORY (INHALATION) at 19:36

## 2024-02-10 RX ADMIN — OXYCODONE HYDROCHLORIDE 10 MG: 10 TABLET ORAL at 09:26

## 2024-02-10 RX ADMIN — IPRATROPIUM BROMIDE AND ALBUTEROL SULFATE 1 DOSE: 2.5; .5 SOLUTION RESPIRATORY (INHALATION) at 07:52

## 2024-02-10 RX ADMIN — OXYCODONE HYDROCHLORIDE 10 MG: 10 TABLET ORAL at 21:21

## 2024-02-10 RX ADMIN — ALLOPURINOL 300 MG: 300 TABLET ORAL at 09:25

## 2024-02-10 RX ADMIN — DILTIAZEM HYDROCHLORIDE 180 MG: 180 CAPSULE, COATED, EXTENDED RELEASE ORAL at 09:26

## 2024-02-10 RX ADMIN — GABAPENTIN 900 MG: 300 CAPSULE ORAL at 21:21

## 2024-02-10 RX ADMIN — SODIUM CHLORIDE, PRESERVATIVE FREE 10 ML: 5 INJECTION INTRAVENOUS at 09:34

## 2024-02-10 RX ADMIN — INSULIN GLARGINE 18 UNITS: 100 INJECTION, SOLUTION SUBCUTANEOUS at 21:21

## 2024-02-10 NOTE — PLAN OF CARE
Problem: Safety - Adult  Goal: Free from fall injury  2/9/2024 2114 by Kwesi Mcclain, RN  Outcome: Progressing  2/9/2024 1734 by Monika Marquez, RN  Outcome: Progressing  Flowsheets (Taken 2/9/2024 1734)  Free From Fall Injury: Instruct family/caregiver on patient safety

## 2024-02-10 NOTE — PLAN OF CARE
Problem: Chronic Conditions and Co-morbidities  Goal: Patient's chronic conditions and co-morbidity symptoms are monitored and maintained or improved  Outcome: Progressing  Flowsheets (Taken 2/9/2024 2110 by Kwesi Mcclain RN)  Care Plan - Patient's Chronic Conditions and Co-Morbidity Symptoms are Monitored and Maintained or Improved: Monitor and assess patient's chronic conditions and comorbid symptoms for stability, deterioration, or improvement     Problem: Discharge Planning  Goal: Discharge to home or other facility with appropriate resources  Outcome: Progressing  Flowsheets (Taken 2/9/2024 2110 by Kwesi Mcclain, RN)  Discharge to home or other facility with appropriate resources:   Identify barriers to discharge with patient and caregiver   Arrange for needed discharge resources and transportation as appropriate   Identify discharge learning needs (meds, wound care, etc)   Arrange for interpreters to assist at discharge as needed   Refer to discharge planning if patient needs post-hospital services based on physician order or complex needs related to functional status, cognitive ability or social support system     Problem: Safety - Adult  Goal: Free from fall injury  2/10/2024 0735 by Cyndie Montejo RN  Outcome: Progressing  Flowsheets (Taken 2/9/2024 1734 by Monika Marquez, RN)  Free From Fall Injury: Instruct family/caregiver on patient safety  2/9/2024 2114 by Kwesi Mcclain RN  Outcome: Progressing     Problem: Pain  Goal: Verbalizes/displays adequate comfort level or baseline comfort level  2/10/2024 0735 by Cyndie Montejo RN  Outcome: Progressing  Flowsheets (Taken 2/9/2024 1734 by Monika Marquez, RN)  Verbalizes/displays adequate comfort level or baseline comfort level:   Encourage patient to monitor pain and request assistance   Administer analgesics based on type and severity of pain and evaluate response   Consider cultural and social influences on pain and pain management   Assess pain using

## 2024-02-11 LAB
GLUCOSE BLD-MCNC: 155 MG/DL (ref 70–99)
GLUCOSE BLD-MCNC: 160 MG/DL (ref 70–99)
GLUCOSE BLD-MCNC: 162 MG/DL (ref 70–99)
GLUCOSE BLD-MCNC: 167 MG/DL (ref 70–99)
PERFORMED ON: ABNORMAL

## 2024-02-11 PROCEDURE — 1200000000 HC SEMI PRIVATE

## 2024-02-11 PROCEDURE — 6360000002 HC RX W HCPCS: Performed by: HOSPITALIST

## 2024-02-11 PROCEDURE — 94760 N-INVAS EAR/PLS OXIMETRY 1: CPT

## 2024-02-11 PROCEDURE — 6370000000 HC RX 637 (ALT 250 FOR IP): Performed by: HOSPITALIST

## 2024-02-11 PROCEDURE — 94640 AIRWAY INHALATION TREATMENT: CPT

## 2024-02-11 PROCEDURE — 2700000000 HC OXYGEN THERAPY PER DAY

## 2024-02-11 PROCEDURE — 94761 N-INVAS EAR/PLS OXIMETRY MLT: CPT

## 2024-02-11 PROCEDURE — 2580000003 HC RX 258: Performed by: HOSPITALIST

## 2024-02-11 RX ORDER — POLYETHYLENE GLYCOL 3350 17 G/17G
17 POWDER, FOR SOLUTION ORAL 2 TIMES DAILY
Status: DISCONTINUED | OUTPATIENT
Start: 2024-02-11 | End: 2024-02-14 | Stop reason: HOSPADM

## 2024-02-11 RX ORDER — SENNA AND DOCUSATE SODIUM 50; 8.6 MG/1; MG/1
2 TABLET, FILM COATED ORAL DAILY
Status: DISCONTINUED | OUTPATIENT
Start: 2024-02-11 | End: 2024-02-14 | Stop reason: HOSPADM

## 2024-02-11 RX ADMIN — PANTOPRAZOLE SODIUM 40 MG: 40 TABLET, DELAYED RELEASE ORAL at 06:10

## 2024-02-11 RX ADMIN — FERROUS SULFATE TAB EC 324 MG (65 MG FE EQUIVALENT) 325 MG: 324 (65 FE) TABLET DELAYED RESPONSE at 09:51

## 2024-02-11 RX ADMIN — IPRATROPIUM BROMIDE AND ALBUTEROL SULFATE 1 DOSE: 2.5; .5 SOLUTION RESPIRATORY (INHALATION) at 11:04

## 2024-02-11 RX ADMIN — METOPROLOL SUCCINATE 50 MG: 50 TABLET, EXTENDED RELEASE ORAL at 09:51

## 2024-02-11 RX ADMIN — IPRATROPIUM BROMIDE AND ALBUTEROL SULFATE 1 DOSE: 2.5; .5 SOLUTION RESPIRATORY (INHALATION) at 08:05

## 2024-02-11 RX ADMIN — OXYCODONE HYDROCHLORIDE 10 MG: 10 TABLET ORAL at 20:27

## 2024-02-11 RX ADMIN — GABAPENTIN 900 MG: 300 CAPSULE ORAL at 09:51

## 2024-02-11 RX ADMIN — IPRATROPIUM BROMIDE AND ALBUTEROL SULFATE 1 DOSE: 2.5; .5 SOLUTION RESPIRATORY (INHALATION) at 15:21

## 2024-02-11 RX ADMIN — INSULIN GLARGINE 18 UNITS: 100 INJECTION, SOLUTION SUBCUTANEOUS at 20:26

## 2024-02-11 RX ADMIN — SODIUM CHLORIDE, PRESERVATIVE FREE 10 ML: 5 INJECTION INTRAVENOUS at 20:31

## 2024-02-11 RX ADMIN — MOMETASONE FUROATE AND FORMOTEROL FUMARATE DIHYDRATE 2 PUFF: 200; 5 AEROSOL RESPIRATORY (INHALATION) at 19:04

## 2024-02-11 RX ADMIN — GABAPENTIN 900 MG: 300 CAPSULE ORAL at 14:16

## 2024-02-11 RX ADMIN — SENNOSIDES AND DOCUSATE SODIUM 2 TABLET: 8.6; 5 TABLET ORAL at 14:17

## 2024-02-11 RX ADMIN — DILTIAZEM HYDROCHLORIDE 180 MG: 180 CAPSULE, COATED, EXTENDED RELEASE ORAL at 09:51

## 2024-02-11 RX ADMIN — GABAPENTIN 900 MG: 300 CAPSULE ORAL at 20:27

## 2024-02-11 RX ADMIN — ALLOPURINOL 300 MG: 300 TABLET ORAL at 09:00

## 2024-02-11 RX ADMIN — POLYETHYLENE GLYCOL 3350 17 G: 17 POWDER, FOR SOLUTION ORAL at 20:27

## 2024-02-11 RX ADMIN — SODIUM CHLORIDE, PRESERVATIVE FREE 10 ML: 5 INJECTION INTRAVENOUS at 09:56

## 2024-02-11 RX ADMIN — FOLIC ACID 1000 MCG: 1 TABLET ORAL at 09:51

## 2024-02-11 RX ADMIN — OXYCODONE HYDROCHLORIDE 10 MG: 10 TABLET ORAL at 18:29

## 2024-02-11 RX ADMIN — IPRATROPIUM BROMIDE AND ALBUTEROL SULFATE 1 DOSE: 2.5; .5 SOLUTION RESPIRATORY (INHALATION) at 19:04

## 2024-02-11 RX ADMIN — ENOXAPARIN SODIUM 40 MG: 100 INJECTION SUBCUTANEOUS at 09:50

## 2024-02-11 RX ADMIN — MOMETASONE FUROATE AND FORMOTEROL FUMARATE DIHYDRATE 2 PUFF: 200; 5 AEROSOL RESPIRATORY (INHALATION) at 08:05

## 2024-02-11 RX ADMIN — ATORVASTATIN CALCIUM 40 MG: 40 TABLET, FILM COATED ORAL at 09:51

## 2024-02-11 RX ADMIN — PREDNISONE 40 MG: 20 TABLET ORAL at 09:51

## 2024-02-11 RX ADMIN — OXYCODONE HYDROCHLORIDE 10 MG: 10 TABLET ORAL at 14:17

## 2024-02-11 RX ADMIN — DULOXETINE HYDROCHLORIDE 60 MG: 60 CAPSULE, DELAYED RELEASE ORAL at 09:51

## 2024-02-11 RX ADMIN — OXYCODONE HYDROCHLORIDE 10 MG: 10 TABLET ORAL at 09:51

## 2024-02-11 NOTE — PLAN OF CARE
Problem: Chronic Conditions and Co-morbidities  Goal: Patient's chronic conditions and co-morbidity symptoms are monitored and maintained or improved  Outcome: Progressing  Flowsheets (Taken 2/9/2024 2110 by Kwesi Mcclain, RN)  Care Plan - Patient's Chronic Conditions and Co-Morbidity Symptoms are Monitored and Maintained or Improved: Monitor and assess patient's chronic conditions and comorbid symptoms for stability, deterioration, or improvement     Problem: Discharge Planning  Goal: Discharge to home or other facility with appropriate resources  Outcome: Progressing  Flowsheets (Taken 2/10/2024 1959 by Kwesi Mcclain, RN)  Discharge to home or other facility with appropriate resources:   Identify barriers to discharge with patient and caregiver   Arrange for needed discharge resources and transportation as appropriate   Identify discharge learning needs (meds, wound care, etc)   Arrange for interpreters to assist at discharge as needed   Refer to discharge planning if patient needs post-hospital services based on physician order or complex needs related to functional status, cognitive ability or social support system     Problem: Safety - Adult  Goal: Free from fall injury  Outcome: Progressing  Flowsheets (Taken 2/9/2024 1734 by Monika Marquez, RN)  Free From Fall Injury: Instruct family/caregiver on patient safety     Problem: Pain  Goal: Verbalizes/displays adequate comfort level or baseline comfort level  Outcome: Progressing  Flowsheets (Taken 2/9/2024 1734 by Monika Marquez, RN)  Verbalizes/displays adequate comfort level or baseline comfort level:   Encourage patient to monitor pain and request assistance   Administer analgesics based on type and severity of pain and evaluate response   Consider cultural and social influences on pain and pain management   Assess pain using appropriate pain scale   Implement non-pharmacological measures as appropriate and evaluate response     Problem: ABCDS Injury

## 2024-02-11 NOTE — RT PROTOCOL NOTE
RT Inhaler-Nebulizer Bronchodilator Protocol Note    There is a bronchodilator order in the chart from a provider indicating to follow the RT Bronchodilator Protocol and there is an “Initiate RT Inhaler-Nebulizer Bronchodilator Protocol” order as well (see protocol at bottom of note).    CXR Findings:  No results found.    The findings from the last RT Protocol Assessment were as follows:   History Pulmonary Disease: Chronic pulmonary disease  Respiratory Pattern: Dyspnea on exertion or RR 21-25 bpm  Breath Sounds: Intermittent or unilateral wheezes  Cough: Strong, productive  Indication for Bronchodilator Therapy: Decreased or absent breath sounds, On home bronchodilators  Bronchodilator Assessment Score: 9    Aerosolized bronchodilator medication orders have been revised according to the RT Inhaler-Nebulizer Bronchodilator Protocol below.    Respiratory Therapist to perform RT Therapy Protocol Assessment initially then follow the protocol.  Repeat RT Therapy Protocol Assessment PRN for score 0-3 or on second treatment, BID, and PRN for scores above 3.    No Indications - adjust the frequency to every 6 hours PRN wheezing or bronchospasm, if no treatments needed after 48 hours then discontinue using Per Protocol order mode.     If indication present, adjust the RT bronchodilator orders based on the Bronchodilator Assessment Score as indicated below.  Use Inhaler orders unless patient has one or more of the following: on home nebulizer, not able to hold breath for 10 seconds, is not alert and oriented, cannot activate and use MDI correctly, or respiratory rate 25 breaths per minute or more, then use the equivalent nebulizer order(s) with same Frequency and PRN reasons based on the score.  If a patient is on this medication at home then do not decrease Frequency below that used at home.    0-3 - enter or revise RT bronchodilator order(s) to equivalent RT Bronchodilator order with Frequency of every 4 hours PRN for

## 2024-02-12 ENCOUNTER — APPOINTMENT (OUTPATIENT)
Dept: GENERAL RADIOLOGY | Age: 69
DRG: 493 | End: 2024-02-12
Attending: INTERNAL MEDICINE
Payer: MEDICARE

## 2024-02-12 ENCOUNTER — ANESTHESIA EVENT (OUTPATIENT)
Dept: OPERATING ROOM | Age: 69
DRG: 493 | End: 2024-02-12
Payer: MEDICARE

## 2024-02-12 ENCOUNTER — ANESTHESIA (OUTPATIENT)
Dept: OPERATING ROOM | Age: 69
DRG: 493 | End: 2024-02-12
Payer: MEDICARE

## 2024-02-12 LAB
ANION GAP SERPL CALCULATED.3IONS-SCNC: 7 MMOL/L (ref 3–16)
BASOPHILS # BLD: 0 K/UL (ref 0–0.2)
BASOPHILS NFR BLD: 0.1 %
BUN SERPL-MCNC: 16 MG/DL (ref 7–20)
CALCIUM SERPL-MCNC: 10 MG/DL (ref 8.3–10.6)
CHLORIDE SERPL-SCNC: 94 MMOL/L (ref 99–110)
CO2 SERPL-SCNC: 36 MMOL/L (ref 21–32)
CREAT SERPL-MCNC: 0.7 MG/DL (ref 0.8–1.3)
DEPRECATED RDW RBC AUTO: 14.6 % (ref 12.4–15.4)
EOSINOPHIL # BLD: 0 K/UL (ref 0–0.6)
EOSINOPHIL NFR BLD: 0.1 %
GFR SERPLBLD CREATININE-BSD FMLA CKD-EPI: >60 ML/MIN/{1.73_M2}
GLUCOSE BLD-MCNC: 128 MG/DL (ref 70–99)
GLUCOSE BLD-MCNC: 141 MG/DL (ref 70–99)
GLUCOSE BLD-MCNC: 149 MG/DL (ref 70–99)
GLUCOSE BLD-MCNC: 156 MG/DL (ref 70–99)
GLUCOSE BLD-MCNC: 165 MG/DL (ref 70–99)
GLUCOSE BLD-MCNC: 225 MG/DL (ref 70–99)
GLUCOSE BLD-MCNC: 299 MG/DL (ref 70–99)
GLUCOSE SERPL-MCNC: 158 MG/DL (ref 70–99)
HCT VFR BLD AUTO: 43.1 % (ref 40.5–52.5)
HGB BLD-MCNC: 14.5 G/DL (ref 13.5–17.5)
LYMPHOCYTES # BLD: 0.7 K/UL (ref 1–5.1)
LYMPHOCYTES NFR BLD: 9.1 %
MCH RBC QN AUTO: 31 PG (ref 26–34)
MCHC RBC AUTO-ENTMCNC: 33.7 G/DL (ref 31–36)
MCV RBC AUTO: 92.1 FL (ref 80–100)
MONOCYTES # BLD: 0.8 K/UL (ref 0–1.3)
MONOCYTES NFR BLD: 10.8 %
NEUTROPHILS # BLD: 6.1 K/UL (ref 1.7–7.7)
NEUTROPHILS NFR BLD: 79.9 %
PERFORMED ON: ABNORMAL
PLATELET # BLD AUTO: 226 K/UL (ref 135–450)
PMV BLD AUTO: 7.5 FL (ref 5–10.5)
POTASSIUM SERPL-SCNC: 4.6 MMOL/L (ref 3.5–5.1)
RBC # BLD AUTO: 4.68 M/UL (ref 4.2–5.9)
SODIUM SERPL-SCNC: 137 MMOL/L (ref 136–145)
WBC # BLD AUTO: 7.7 K/UL (ref 4–11)

## 2024-02-12 PROCEDURE — 2720000010 HC SURG SUPPLY STERILE: Performed by: ORTHOPAEDIC SURGERY

## 2024-02-12 PROCEDURE — 3700000000 HC ANESTHESIA ATTENDED CARE: Performed by: ORTHOPAEDIC SURGERY

## 2024-02-12 PROCEDURE — 64447 NJX AA&/STRD FEMORAL NRV IMG: CPT | Performed by: ANESTHESIOLOGY

## 2024-02-12 PROCEDURE — 6370000000 HC RX 637 (ALT 250 FOR IP): Performed by: NURSE PRACTITIONER

## 2024-02-12 PROCEDURE — 2500000003 HC RX 250 WO HCPCS

## 2024-02-12 PROCEDURE — 80048 BASIC METABOLIC PNL TOTAL CA: CPT

## 2024-02-12 PROCEDURE — 94761 N-INVAS EAR/PLS OXIMETRY MLT: CPT

## 2024-02-12 PROCEDURE — 2700000000 HC OXYGEN THERAPY PER DAY

## 2024-02-12 PROCEDURE — 6360000002 HC RX W HCPCS: Performed by: ORTHOPAEDIC SURGERY

## 2024-02-12 PROCEDURE — 6360000002 HC RX W HCPCS: Performed by: ANESTHESIOLOGY

## 2024-02-12 PROCEDURE — 2580000003 HC RX 258: Performed by: NURSE PRACTITIONER

## 2024-02-12 PROCEDURE — 3E0T3BZ INTRODUCTION OF ANESTHETIC AGENT INTO PERIPHERAL NERVES AND PLEXI, PERCUTANEOUS APPROACH: ICD-10-PCS | Performed by: ORTHOPAEDIC SURGERY

## 2024-02-12 PROCEDURE — 2580000003 HC RX 258: Performed by: HOSPITALIST

## 2024-02-12 PROCEDURE — 6370000000 HC RX 637 (ALT 250 FOR IP)

## 2024-02-12 PROCEDURE — 73600 X-RAY EXAM OF ANKLE: CPT

## 2024-02-12 PROCEDURE — 1200000000 HC SEMI PRIVATE

## 2024-02-12 PROCEDURE — 6360000002 HC RX W HCPCS: Performed by: NURSE PRACTITIONER

## 2024-02-12 PROCEDURE — 3600000005 HC SURGERY LEVEL 5 BASE: Performed by: ORTHOPAEDIC SURGERY

## 2024-02-12 PROCEDURE — 3700000001 HC ADD 15 MINUTES (ANESTHESIA): Performed by: ORTHOPAEDIC SURGERY

## 2024-02-12 PROCEDURE — 0QSK04Z REPOSITION LEFT FIBULA WITH INTERNAL FIXATION DEVICE, OPEN APPROACH: ICD-10-PCS | Performed by: ORTHOPAEDIC SURGERY

## 2024-02-12 PROCEDURE — 7100000000 HC PACU RECOVERY - FIRST 15 MIN: Performed by: ORTHOPAEDIC SURGERY

## 2024-02-12 PROCEDURE — 94640 AIRWAY INHALATION TREATMENT: CPT

## 2024-02-12 PROCEDURE — 2709999900 HC NON-CHARGEABLE SUPPLY: Performed by: ORTHOPAEDIC SURGERY

## 2024-02-12 PROCEDURE — C1713 ANCHOR/SCREW BN/BN,TIS/BN: HCPCS | Performed by: ORTHOPAEDIC SURGERY

## 2024-02-12 PROCEDURE — 3600000015 HC SURGERY LEVEL 5 ADDTL 15MIN: Performed by: ORTHOPAEDIC SURGERY

## 2024-02-12 PROCEDURE — 2580000003 HC RX 258

## 2024-02-12 PROCEDURE — 85025 COMPLETE CBC W/AUTO DIFF WBC: CPT

## 2024-02-12 PROCEDURE — 2580000003 HC RX 258: Performed by: ORTHOPAEDIC SURGERY

## 2024-02-12 PROCEDURE — 6360000002 HC RX W HCPCS

## 2024-02-12 PROCEDURE — 7100000001 HC PACU RECOVERY - ADDTL 15 MIN: Performed by: ORTHOPAEDIC SURGERY

## 2024-02-12 PROCEDURE — 64445 NJX AA&/STRD SCIATIC NRV IMG: CPT | Performed by: ANESTHESIOLOGY

## 2024-02-12 PROCEDURE — 36415 COLL VENOUS BLD VENIPUNCTURE: CPT

## 2024-02-12 PROCEDURE — 0SSG0ZZ REPOSITION LEFT ANKLE JOINT, OPEN APPROACH: ICD-10-PCS | Performed by: ORTHOPAEDIC SURGERY

## 2024-02-12 PROCEDURE — A4217 STERILE WATER/SALINE, 500 ML: HCPCS | Performed by: ORTHOPAEDIC SURGERY

## 2024-02-12 DEVICE — SCREW BNE L16MM DIA3.5MM HEX HD DIA2.7MM DST LAT PERIARTC S: Type: IMPLANTABLE DEVICE | Site: ANKLE | Status: FUNCTIONAL

## 2024-02-12 DEVICE — SCREW BNE L16MM DIA3.5MM HD DIA2.7MM PERIARTC CORT S STL ST: Type: IMPLANTABLE DEVICE | Site: ANKLE | Status: FUNCTIONAL

## 2024-02-12 DEVICE — SCREW BNE L20MM DIA2.7MM HEX HD DIA2.5MM CANC BIODUR 108C: Type: IMPLANTABLE DEVICE | Site: ANKLE | Status: FUNCTIONAL

## 2024-02-12 DEVICE — SCREW BNE L16MM DIA2.7MM LNG CORT FT ANK S STL ST: Type: IMPLANTABLE DEVICE | Site: ANKLE | Status: FUNCTIONAL

## 2024-02-12 DEVICE — SCREW BNE L56MM DIA3.5MM CORT PERIARTC S STL ST: Type: IMPLANTABLE DEVICE | Site: ANKLE | Status: FUNCTIONAL

## 2024-02-12 DEVICE — SCREW BNE L18MM DIA2.7MM HEX HD DIA2.5MM CANC BIODUR 108C: Type: IMPLANTABLE DEVICE | Site: ANKLE | Status: FUNCTIONAL

## 2024-02-12 DEVICE — SCREW BNE L50MM DIA3.5MM HD DIA2.7MM LNG PERIARTC ST: Type: IMPLANTABLE DEVICE | Site: ANKLE | Status: FUNCTIONAL

## 2024-02-12 DEVICE — PLATE BNE L80MM 4 H L LAT DST PERIARTC FIBULAR S STL LOK: Type: IMPLANTABLE DEVICE | Site: ANKLE | Status: FUNCTIONAL

## 2024-02-12 DEVICE — SCREW BNE L16MM DIA2.7MM HEX HD DIA2.5MM CANC BIODUR 108C: Type: IMPLANTABLE DEVICE | Site: ANKLE | Status: FUNCTIONAL

## 2024-02-12 RX ORDER — BUPIVACAINE HYDROCHLORIDE 2.5 MG/ML
INJECTION, SOLUTION EPIDURAL; INFILTRATION; INTRACAUDAL
Status: COMPLETED | OUTPATIENT
Start: 2024-02-12 | End: 2024-02-12

## 2024-02-12 RX ORDER — SODIUM CHLORIDE 0.9 % (FLUSH) 0.9 %
5-40 SYRINGE (ML) INJECTION EVERY 12 HOURS SCHEDULED
Status: DISCONTINUED | OUTPATIENT
Start: 2024-02-12 | End: 2024-02-14 | Stop reason: HOSPADM

## 2024-02-12 RX ORDER — FENTANYL CITRATE 0.05 MG/ML
50 INJECTION, SOLUTION INTRAMUSCULAR; INTRAVENOUS EVERY 5 MIN PRN
Status: DISCONTINUED | OUTPATIENT
Start: 2024-02-12 | End: 2024-02-12 | Stop reason: HOSPADM

## 2024-02-12 RX ORDER — LIDOCAINE HYDROCHLORIDE 40 MG/ML
SOLUTION TOPICAL PRN
Status: DISCONTINUED | OUTPATIENT
Start: 2024-02-12 | End: 2024-02-12 | Stop reason: SDUPTHER

## 2024-02-12 RX ORDER — FENTANYL CITRATE 0.05 MG/ML
25 INJECTION, SOLUTION INTRAMUSCULAR; INTRAVENOUS EVERY 5 MIN PRN
Status: DISCONTINUED | OUTPATIENT
Start: 2024-02-12 | End: 2024-02-12 | Stop reason: HOSPADM

## 2024-02-12 RX ORDER — ENOXAPARIN SODIUM 100 MG/ML
30 INJECTION SUBCUTANEOUS 2 TIMES DAILY
Status: DISCONTINUED | OUTPATIENT
Start: 2024-02-13 | End: 2024-02-14 | Stop reason: HOSPADM

## 2024-02-12 RX ORDER — MEPERIDINE HYDROCHLORIDE 25 MG/ML
12.5 INJECTION INTRAMUSCULAR; INTRAVENOUS; SUBCUTANEOUS EVERY 5 MIN PRN
Status: DISCONTINUED | OUTPATIENT
Start: 2024-02-12 | End: 2024-02-12 | Stop reason: HOSPADM

## 2024-02-12 RX ORDER — FENTANYL CITRATE 50 UG/ML
INJECTION, SOLUTION INTRAMUSCULAR; INTRAVENOUS PRN
Status: DISCONTINUED | OUTPATIENT
Start: 2024-02-12 | End: 2024-02-12 | Stop reason: SDUPTHER

## 2024-02-12 RX ORDER — ALBUTEROL SULFATE 2.5 MG/3ML
2.5 SOLUTION RESPIRATORY (INHALATION) ONCE
Status: COMPLETED | OUTPATIENT
Start: 2024-02-12 | End: 2024-02-12

## 2024-02-12 RX ORDER — SUCCINYLCHOLINE/SOD CL,ISO/PF 200MG/10ML
SYRINGE (ML) INTRAVENOUS PRN
Status: DISCONTINUED | OUTPATIENT
Start: 2024-02-12 | End: 2024-02-12 | Stop reason: SDUPTHER

## 2024-02-12 RX ORDER — MIDAZOLAM HYDROCHLORIDE 1 MG/ML
INJECTION INTRAMUSCULAR; INTRAVENOUS PRN
Status: DISCONTINUED | OUTPATIENT
Start: 2024-02-12 | End: 2024-02-12 | Stop reason: SDUPTHER

## 2024-02-12 RX ORDER — DEXAMETHASONE SODIUM PHOSPHATE 4 MG/ML
INJECTION, SOLUTION INTRA-ARTICULAR; INTRALESIONAL; INTRAMUSCULAR; INTRAVENOUS; SOFT TISSUE PRN
Status: DISCONTINUED | OUTPATIENT
Start: 2024-02-12 | End: 2024-02-12 | Stop reason: SDUPTHER

## 2024-02-12 RX ORDER — SODIUM CHLORIDE 9 MG/ML
INJECTION, SOLUTION INTRAVENOUS PRN
Status: DISCONTINUED | OUTPATIENT
Start: 2024-02-12 | End: 2024-02-12 | Stop reason: HOSPADM

## 2024-02-12 RX ORDER — SODIUM CHLORIDE 0.9 % (FLUSH) 0.9 %
5-40 SYRINGE (ML) INJECTION EVERY 12 HOURS SCHEDULED
Status: DISCONTINUED | OUTPATIENT
Start: 2024-02-12 | End: 2024-02-12 | Stop reason: HOSPADM

## 2024-02-12 RX ORDER — PROPOFOL 10 MG/ML
INJECTION, EMULSION INTRAVENOUS PRN
Status: DISCONTINUED | OUTPATIENT
Start: 2024-02-12 | End: 2024-02-12 | Stop reason: SDUPTHER

## 2024-02-12 RX ORDER — LIDOCAINE HYDROCHLORIDE 20 MG/ML
INJECTION, SOLUTION EPIDURAL; INFILTRATION; INTRACAUDAL; PERINEURAL PRN
Status: DISCONTINUED | OUTPATIENT
Start: 2024-02-12 | End: 2024-02-12 | Stop reason: SDUPTHER

## 2024-02-12 RX ORDER — ONDANSETRON 2 MG/ML
4 INJECTION INTRAMUSCULAR; INTRAVENOUS
Status: DISCONTINUED | OUTPATIENT
Start: 2024-02-12 | End: 2024-02-12 | Stop reason: HOSPADM

## 2024-02-12 RX ORDER — SODIUM CHLORIDE 0.9 % (FLUSH) 0.9 %
5-40 SYRINGE (ML) INJECTION PRN
Status: DISCONTINUED | OUTPATIENT
Start: 2024-02-12 | End: 2024-02-14 | Stop reason: HOSPADM

## 2024-02-12 RX ORDER — SODIUM CHLORIDE 9 MG/ML
INJECTION, SOLUTION INTRAVENOUS CONTINUOUS PRN
Status: DISCONTINUED | OUTPATIENT
Start: 2024-02-12 | End: 2024-02-12 | Stop reason: SDUPTHER

## 2024-02-12 RX ORDER — EPHEDRINE SULFATE 50 MG/ML
INJECTION INTRAVENOUS PRN
Status: DISCONTINUED | OUTPATIENT
Start: 2024-02-12 | End: 2024-02-12 | Stop reason: SDUPTHER

## 2024-02-12 RX ORDER — PHENYLEPHRINE HCL IN 0.9% NACL 1 MG/10 ML
SYRINGE (ML) INTRAVENOUS PRN
Status: DISCONTINUED | OUTPATIENT
Start: 2024-02-12 | End: 2024-02-12 | Stop reason: SDUPTHER

## 2024-02-12 RX ORDER — ROCURONIUM BROMIDE 10 MG/ML
INJECTION, SOLUTION INTRAVENOUS PRN
Status: DISCONTINUED | OUTPATIENT
Start: 2024-02-12 | End: 2024-02-12 | Stop reason: SDUPTHER

## 2024-02-12 RX ORDER — SODIUM CHLORIDE 0.9 % (FLUSH) 0.9 %
5-40 SYRINGE (ML) INJECTION PRN
Status: DISCONTINUED | OUTPATIENT
Start: 2024-02-12 | End: 2024-02-12 | Stop reason: HOSPADM

## 2024-02-12 RX ADMIN — INSULIN GLARGINE 18 UNITS: 100 INJECTION, SOLUTION SUBCUTANEOUS at 20:49

## 2024-02-12 RX ADMIN — SODIUM CHLORIDE 2000 MG: 900 INJECTION INTRAVENOUS at 09:59

## 2024-02-12 RX ADMIN — SUGAMMADEX 200 MG: 100 INJECTION, SOLUTION INTRAVENOUS at 11:00

## 2024-02-12 RX ADMIN — MIDAZOLAM 1 MG: 1 INJECTION INTRAMUSCULAR; INTRAVENOUS at 09:49

## 2024-02-12 RX ADMIN — SODIUM CHLORIDE 2000 MG: 900 INJECTION INTRAVENOUS at 17:30

## 2024-02-12 RX ADMIN — GABAPENTIN 900 MG: 300 CAPSULE ORAL at 20:49

## 2024-02-12 RX ADMIN — POLYETHYLENE GLYCOL 3350 17 G: 17 POWDER, FOR SOLUTION ORAL at 20:48

## 2024-02-12 RX ADMIN — LIDOCAINE HYDROCHLORIDE 4 ML: 40 SOLUTION TOPICAL at 09:57

## 2024-02-12 RX ADMIN — BUPIVACAINE HYDROCHLORIDE 20 ML: 2.5 INJECTION, SOLUTION EPIDURAL; INFILTRATION; INTRACAUDAL at 09:42

## 2024-02-12 RX ADMIN — SODIUM CHLORIDE: 9 INJECTION, SOLUTION INTRAVENOUS at 09:49

## 2024-02-12 RX ADMIN — SODIUM CHLORIDE, PRESERVATIVE FREE 10 ML: 5 INJECTION INTRAVENOUS at 08:26

## 2024-02-12 RX ADMIN — IPRATROPIUM BROMIDE AND ALBUTEROL SULFATE 1 DOSE: 2.5; .5 SOLUTION RESPIRATORY (INHALATION) at 19:38

## 2024-02-12 RX ADMIN — FENTANYL CITRATE 50 MCG: 50 INJECTION INTRAMUSCULAR; INTRAVENOUS at 09:53

## 2024-02-12 RX ADMIN — EPHEDRINE SULFATE 5 MG: 50 INJECTION INTRAVENOUS at 10:16

## 2024-02-12 RX ADMIN — GABAPENTIN 900 MG: 300 CAPSULE ORAL at 13:25

## 2024-02-12 RX ADMIN — DEXAMETHASONE SODIUM PHOSPHATE 4 MG: 4 INJECTION, SOLUTION INTRAMUSCULAR; INTRAVENOUS at 10:03

## 2024-02-12 RX ADMIN — INSULIN LISPRO 2 UNITS: 100 INJECTION, SOLUTION INTRAVENOUS; SUBCUTANEOUS at 17:24

## 2024-02-12 RX ADMIN — MIDAZOLAM 1 MG: 1 INJECTION INTRAMUSCULAR; INTRAVENOUS at 09:42

## 2024-02-12 RX ADMIN — PROPOFOL 100 MG: 10 INJECTION, EMULSION INTRAVENOUS at 09:55

## 2024-02-12 RX ADMIN — Medication 160 MG: at 09:56

## 2024-02-12 RX ADMIN — MOMETASONE FUROATE AND FORMOTEROL FUMARATE DIHYDRATE 2 PUFF: 200; 5 AEROSOL RESPIRATORY (INHALATION) at 19:38

## 2024-02-12 RX ADMIN — EPHEDRINE SULFATE 10 MG: 50 INJECTION INTRAVENOUS at 10:18

## 2024-02-12 RX ADMIN — FENTANYL CITRATE 50 MCG: 50 INJECTION INTRAMUSCULAR; INTRAVENOUS at 09:42

## 2024-02-12 RX ADMIN — LIDOCAINE HYDROCHLORIDE 100 MG: 20 INJECTION, SOLUTION EPIDURAL; INFILTRATION; INTRACAUDAL; PERINEURAL at 09:54

## 2024-02-12 RX ADMIN — ALBUTEROL SULFATE 2.5 MG: 2.5 SOLUTION RESPIRATORY (INHALATION) at 12:03

## 2024-02-12 RX ADMIN — ROCURONIUM BROMIDE 10 MG: 10 INJECTION INTRAVENOUS at 09:55

## 2024-02-12 RX ADMIN — EPHEDRINE SULFATE 10 MG: 50 INJECTION INTRAVENOUS at 10:13

## 2024-02-12 RX ADMIN — BUPIVACAINE HYDROCHLORIDE 20 ML: 2.5 INJECTION, SOLUTION EPIDURAL; INFILTRATION; INTRACAUDAL at 09:40

## 2024-02-12 RX ADMIN — Medication 50 MCG: at 10:16

## 2024-02-12 NOTE — PLAN OF CARE
Problem: Chronic Conditions and Co-morbidities  Goal: Patient's chronic conditions and co-morbidity symptoms are monitored and maintained or improved  Outcome: Progressing     Problem: Safety - Adult  Goal: Free from fall injury  Recent Flowsheet Documentation  Taken 2/11/2024 2003 by Cyndie Montejo RN  Free From Fall Injury: Instruct family/caregiver on patient safety

## 2024-02-12 NOTE — ANESTHESIA POSTPROCEDURE EVALUATION
Department of Anesthesiology  Postprocedure Note    Patient: Sandor Early  MRN: [id number]  YOB: 1955  Date of evaluation: 2/12/2024    Procedure Summary       Date: 02/12/24 Room / Location: [address]    Anesthesia Start: 0949 Anesthesia Stop: 1122    Procedure: OPEN REDUCTION INTERNAL FIXATION OF LEFT ANKLE FRACTURE DISLOCATION WITH SYNDESMOSIS REPAIR (Left: Ankle) Diagnosis:       Closed Dupuytren's fracture dislocation of left ankle, initial encounter      (Closed Dupuytren's fracture dislocation of left ankle, initial encounter [S82.62XA])    Surgeons: Sudeep Ruth MD Responsible Provider: Jay Fenton MD    Anesthesia Type: general, regional ASA Status: 3            Anesthesia Type: No value filed.    Radha Phase I: Radha Score: 4    Radha Phase II:      Anesthesia Post Evaluation    Patient location during evaluation: PACU  Patient participation: complete - patient participated  Level of consciousness: awake and alert  Pain score: 0  Airway patency: patent  Nausea & Vomiting: no nausea and no vomiting  Cardiovascular status: blood pressure returned to baseline  Respiratory status: acceptable (back to baseline)  Hydration status: euvolemic  Pain management: adequate    No notable events documented.

## 2024-02-12 NOTE — PLAN OF CARE
Problem: Chronic Conditions and Co-morbidities  Goal: Patient's chronic conditions and co-morbidity symptoms are monitored and maintained or improved  2/12/2024 0836 by Zaira Mcmahon, RN  Outcome: Progressing  Flowsheets (Taken 2/9/2024 2110 by Kwesi Mcclain, RN)  Care Plan - Patient's Chronic Conditions and Co-Morbidity Symptoms are Monitored and Maintained or Improved: Monitor and assess patient's chronic conditions and comorbid symptoms for stability, deterioration, or improvement     Problem: Discharge Planning  Goal: Discharge to home or other facility with appropriate resources  Outcome: Progressing  Flowsheets (Taken 2/10/2024 1959 by Kwesi Mcclain, RN)  Discharge to home or other facility with appropriate resources:   Identify barriers to discharge with patient and caregiver   Arrange for needed discharge resources and transportation as appropriate   Identify discharge learning needs (meds, wound care, etc)   Arrange for interpreters to assist at discharge as needed   Refer to discharge planning if patient needs post-hospital services based on physician order or complex needs related to functional status, cognitive ability or social support system     Problem: Safety - Adult  Goal: Free from fall injury  Outcome: Progressing  Flowsheets (Taken 2/11/2024 2003 by Cyndie Montejo, RN)  Free From Fall Injury: Instruct family/caregiver on patient safety     Problem: Pain  Goal: Verbalizes/displays adequate comfort level or baseline comfort level  Outcome: Progressing  Flowsheets (Taken 2/9/2024 1734 by Monika Marquez, RN)  Verbalizes/displays adequate comfort level or baseline comfort level:   Encourage patient to monitor pain and request assistance   Administer analgesics based on type and severity of pain and evaluate response   Consider cultural and social influences on pain and pain management   Assess pain using appropriate pain scale   Implement non-pharmacological measures as appropriate and evaluate

## 2024-02-12 NOTE — ANESTHESIA PROCEDURE NOTES
Peripheral Block    Patient location during procedure: pre-op  Reason for block: post-op pain management and at surgeon's request  Start time: 2/12/2024 9:42 AM  End time: 2/12/2024 9:43 AM  Staffing  Performed: anesthesiologist   Anesthesiologist: Jay Fenton MD  Performed by: Jay Fenton MD  Authorized by: Jay Fenton MD    Preanesthetic Checklist  Completed: patient identified, IV checked, site marked, risks and benefits discussed, surgical/procedural consents, equipment checked, pre-op evaluation, timeout performed, anesthesia consent given, oxygen available and monitors applied/VS acknowledged  Peripheral Block   Prep: ChloraPrep  Provider prep: mask and sterile gloves  Patient monitoring: cardiac monitor, continuous pulse ox, frequent blood pressure checks and IV access  Block type: Sciatic  Infragluteal  Laterality: left  Injection technique: single-shot  Guidance: nerve stimulator and ultrasound guided  Local infiltration: lidocaine  Infiltration strength: 1 %  Local infiltration: lidocaine  Dose: 3 mL    Needle   Needle gauge: 21 G  Needle localization: ultrasound guidance, nerve stimulator and anatomical landmarks  Needle insertion depth: 4 cm  Needle length: 10 cm  Assessment   Injection assessment: negative aspiration for heme, no paresthesia on injection and local visualized surrounding nerve on ultrasound  Paresthesia pain: none  Slow fractionated injection: yes  Hemodynamics: stable  Real-time US image taken/store: yes  Outcomes: uncomplicated and patient tolerated procedure well    Additional Notes  Immediately prior to procedure a \"time out\" was called to verify the correct patient, allergies, laterality, procedure and equipment. Time out performed with preop RN    Local Anesthetic: 0.5 %  Bupivacaine   Amount: 20 ml  in 5 ml increments after negative aspiration each time.    Adductor David and Gluteus Raj muscles, Femur and Sciatic Nerve are identified, the tip of

## 2024-02-12 NOTE — ANESTHESIA PRE PROCEDURE
04:31 AM     02/12/2024 04:31 AM       CMP:   Lab Results   Component Value Date/Time     02/12/2024 04:31 AM    K 4.6 02/12/2024 04:31 AM    K 4.3 02/09/2024 06:06 AM    CL 94 02/12/2024 04:31 AM    CO2 36 02/12/2024 04:31 AM    BUN 16 02/12/2024 04:31 AM    CREATININE 0.7 02/12/2024 04:31 AM    GFRAA >60 07/11/2022 11:46 AM    GFRAA >60 04/02/2013 10:28 AM    AGRATIO 1.3 02/07/2024 04:41 PM    LABGLOM >60 02/12/2024 04:31 AM    GLUCOSE 158 02/12/2024 04:31 AM    PROT 7.2 02/07/2024 04:41 PM    PROT 9.1 01/12/2011 11:37 AM    CALCIUM 10.0 02/12/2024 04:31 AM    BILITOT 0.4 02/07/2024 04:41 PM    ALKPHOS 199 02/07/2024 04:41 PM    AST 58 02/07/2024 04:41 PM    ALT 48 02/07/2024 04:41 PM       POC Tests:   Recent Labs     02/12/24  0838   POCGLU 165*       Coags:   Lab Results   Component Value Date/Time    PROTIME 12.5 02/07/2024 04:41 PM    INR 0.93 02/07/2024 04:41 PM    APTT 31.7 06/29/2019 03:50 PM       HCG (If Applicable): No results found for: \"PREGTESTUR\", \"PREGSERUM\", \"HCG\", \"HCGQUANT\"     ABGs:   Lab Results   Component Value Date/Time    PHART 7.349 08/24/2020 11:55 AM    PO2ART 83.5 08/24/2020 11:55 AM    LNJ0JGP 43.8 08/24/2020 11:55 AM    NPM1CBJ 23.6 08/24/2020 11:55 AM    BEART -2.0 08/24/2020 11:55 AM    O4GKXYXU 94.4 08/24/2020 11:55 AM        Type & Screen (If Applicable):  No results found for: \"LABABO\", \"LABRH\"    Drug/Infectious Status (If Applicable):  No results found for: \"HIV\", \"HEPCAB\"    COVID-19 Screening (If Applicable):   Lab Results   Component Value Date/Time    COVID19 DETECTED 09/05/2023 02:27 AM    COVID19 Not Detected 10/26/2021 01:07 PM           Anesthesia Evaluation    Airway: Mallampati: III  TM distance: >3 FB   Neck ROM: full     Dental:    (+) edentulous      Pulmonary:   (+)  COPD:  shortness of breath:     decreased breath sounds                              ROS comment: On 4L NC, chronic hypoxic resp failure   Cardiovascular:    (+) hypertension:,

## 2024-02-12 NOTE — ANESTHESIA PROCEDURE NOTES
Peripheral Block    Patient location during procedure: pre-op  Reason for block: post-op pain management and at surgeon's request  Start time: 2/12/2024 9:40 AM  End time: 2/12/2024 9:41 AM  Staffing  Performed: anesthesiologist   Anesthesiologist: Jay Fenton MD  Performed by: Jay Fenton MD  Authorized by: Jay Fenton MD    Preanesthetic Checklist  Completed: patient identified, IV checked, site marked, risks and benefits discussed, surgical/procedural consents, equipment checked, pre-op evaluation, timeout performed, anesthesia consent given, oxygen available and monitors applied/VS acknowledged  Peripheral Block   Patient position: supine  Prep: ChloraPrep  Provider prep: mask and sterile gloves  Patient monitoring: cardiac monitor, continuous pulse ox, frequent blood pressure checks and IV access  Block type: Femoral  Approach to block: Low Femoral.  Laterality: left  Injection technique: single-shot  Guidance: ultrasound guided  Local infiltration: lidocaine  Infiltration strength: 1 %  Local infiltration: lidocaine  Dose: 3 mL    Needle   Needle gauge: 21 G  Needle localization: ultrasound guidance  Needle insertion depth: 4 cm  Needle length: 10 cm  Assessment   Injection assessment: negative aspiration for heme, no paresthesia on injection and local visualized surrounding nerve on ultrasound  Paresthesia pain: none  Slow fractionated injection: yes  Hemodynamics: stable  Real-time US image taken/store: yes  Outcomes: patient tolerated procedure well    Additional Notes  Immediately prior to procedure a \"time out\" was called to verify the correct patient, allergies, laterality, procedure and equipment. Time out performed with preop RN    Local Anesthetic: 0.125 %  Bupivacaine   Amount: 20 ml  in 5 ml increments after negative aspiration each time.    Iliopsoas Muscle and Fascia Iliaca, Femoral artery (Deep artery to the thigh take off), Femoral Vein and Femoral Nerve are

## 2024-02-12 NOTE — CARE COORDINATION
Continue to follow patient for discharge needs. Therapy post surgery ordered and pending.    Electronically signed by ROLA Gudino, NARENDRA, Case Management on 2/12/2024 at 4:05 PM  [place] [phone]    4:37 PM  Spoke with patient regarding discharge planning process and that therapy would work with patient to make recommendations.   Electronically signed by ROLA Casarez, NARENDRA, Case Management 2/12/2024 at 4:38 PM  [phone]

## 2024-02-13 LAB
GLUCOSE BLD-MCNC: 156 MG/DL (ref 70–99)
GLUCOSE BLD-MCNC: 172 MG/DL (ref 70–99)
GLUCOSE BLD-MCNC: 172 MG/DL (ref 70–99)
GLUCOSE BLD-MCNC: 218 MG/DL (ref 70–99)
PERFORMED ON: ABNORMAL

## 2024-02-13 PROCEDURE — 97542 WHEELCHAIR MNGMENT TRAINING: CPT

## 2024-02-13 PROCEDURE — 1200000000 HC SEMI PRIVATE

## 2024-02-13 PROCEDURE — 6370000000 HC RX 637 (ALT 250 FOR IP): Performed by: NURSE PRACTITIONER

## 2024-02-13 PROCEDURE — 2580000003 HC RX 258: Performed by: NURSE PRACTITIONER

## 2024-02-13 PROCEDURE — 97530 THERAPEUTIC ACTIVITIES: CPT

## 2024-02-13 PROCEDURE — 97162 PT EVAL MOD COMPLEX 30 MIN: CPT

## 2024-02-13 PROCEDURE — 97166 OT EVAL MOD COMPLEX 45 MIN: CPT

## 2024-02-13 PROCEDURE — 2700000000 HC OXYGEN THERAPY PER DAY

## 2024-02-13 PROCEDURE — 6360000002 HC RX W HCPCS: Performed by: HOSPITALIST

## 2024-02-13 PROCEDURE — 94640 AIRWAY INHALATION TREATMENT: CPT

## 2024-02-13 PROCEDURE — 94760 N-INVAS EAR/PLS OXIMETRY 1: CPT

## 2024-02-13 PROCEDURE — 97116 GAIT TRAINING THERAPY: CPT

## 2024-02-13 PROCEDURE — 6360000002 HC RX W HCPCS: Performed by: NURSE PRACTITIONER

## 2024-02-13 RX ORDER — LACTULOSE 10 G/15ML
20 SOLUTION ORAL ONCE
Status: COMPLETED | OUTPATIENT
Start: 2024-02-13 | End: 2024-02-13

## 2024-02-13 RX ORDER — ASPIRIN 81 MG/1
81 TABLET ORAL
Qty: 60 TABLET | Refills: 0 | Status: SHIPPED | OUTPATIENT
Start: 2024-02-13 | End: 2024-03-14

## 2024-02-13 RX ADMIN — OXYCODONE HYDROCHLORIDE 10 MG: 10 TABLET ORAL at 13:49

## 2024-02-13 RX ADMIN — METOPROLOL SUCCINATE 50 MG: 50 TABLET, EXTENDED RELEASE ORAL at 09:34

## 2024-02-13 RX ADMIN — INSULIN GLARGINE 18 UNITS: 100 INJECTION, SOLUTION SUBCUTANEOUS at 20:48

## 2024-02-13 RX ADMIN — ATORVASTATIN CALCIUM 40 MG: 40 TABLET, FILM COATED ORAL at 09:36

## 2024-02-13 RX ADMIN — DULOXETINE HYDROCHLORIDE 60 MG: 60 CAPSULE, DELAYED RELEASE ORAL at 09:38

## 2024-02-13 RX ADMIN — POLYETHYLENE GLYCOL 3350 17 G: 17 POWDER, FOR SOLUTION ORAL at 20:41

## 2024-02-13 RX ADMIN — INSULIN LISPRO 2 UNITS: 100 INJECTION, SOLUTION INTRAVENOUS; SUBCUTANEOUS at 11:49

## 2024-02-13 RX ADMIN — OXYCODONE HYDROCHLORIDE 10 MG: 10 TABLET ORAL at 09:30

## 2024-02-13 RX ADMIN — OXYCODONE HYDROCHLORIDE 10 MG: 10 TABLET ORAL at 20:41

## 2024-02-13 RX ADMIN — GABAPENTIN 900 MG: 300 CAPSULE ORAL at 20:41

## 2024-02-13 RX ADMIN — FOLIC ACID 1000 MCG: 1 TABLET ORAL at 09:36

## 2024-02-13 RX ADMIN — IPRATROPIUM BROMIDE AND ALBUTEROL SULFATE 1 DOSE: 2.5; .5 SOLUTION RESPIRATORY (INHALATION) at 13:04

## 2024-02-13 RX ADMIN — SENNOSIDES AND DOCUSATE SODIUM 2 TABLET: 8.6; 5 TABLET ORAL at 09:34

## 2024-02-13 RX ADMIN — LACTULOSE 20 G: 20 SOLUTION ORAL at 20:40

## 2024-02-13 RX ADMIN — SODIUM CHLORIDE 2000 MG: 900 INJECTION INTRAVENOUS at 02:08

## 2024-02-13 RX ADMIN — OXYCODONE HYDROCHLORIDE 10 MG: 10 TABLET ORAL at 17:26

## 2024-02-13 RX ADMIN — IPRATROPIUM BROMIDE AND ALBUTEROL SULFATE 1 DOSE: 2.5; .5 SOLUTION RESPIRATORY (INHALATION) at 20:24

## 2024-02-13 RX ADMIN — ENOXAPARIN SODIUM 30 MG: 100 INJECTION SUBCUTANEOUS at 09:38

## 2024-02-13 RX ADMIN — GABAPENTIN 900 MG: 300 CAPSULE ORAL at 13:49

## 2024-02-13 RX ADMIN — DILTIAZEM HYDROCHLORIDE 180 MG: 180 CAPSULE, COATED, EXTENDED RELEASE ORAL at 09:44

## 2024-02-13 RX ADMIN — MOMETASONE FUROATE AND FORMOTEROL FUMARATE DIHYDRATE 2 PUFF: 200; 5 AEROSOL RESPIRATORY (INHALATION) at 08:34

## 2024-02-13 RX ADMIN — ENOXAPARIN SODIUM 30 MG: 100 INJECTION SUBCUTANEOUS at 20:41

## 2024-02-13 RX ADMIN — SODIUM CHLORIDE, PRESERVATIVE FREE 10 ML: 5 INJECTION INTRAVENOUS at 20:49

## 2024-02-13 RX ADMIN — MOMETASONE FUROATE AND FORMOTEROL FUMARATE DIHYDRATE 2 PUFF: 200; 5 AEROSOL RESPIRATORY (INHALATION) at 20:24

## 2024-02-13 RX ADMIN — POLYETHYLENE GLYCOL 3350 17 G: 17 POWDER, FOR SOLUTION ORAL at 09:26

## 2024-02-13 RX ADMIN — ALLOPURINOL 300 MG: 300 TABLET ORAL at 09:34

## 2024-02-13 RX ADMIN — GABAPENTIN 900 MG: 300 CAPSULE ORAL at 09:33

## 2024-02-13 RX ADMIN — PANTOPRAZOLE SODIUM 40 MG: 40 TABLET, DELAYED RELEASE ORAL at 06:42

## 2024-02-13 RX ADMIN — IPRATROPIUM BROMIDE AND ALBUTEROL SULFATE 1 DOSE: 2.5; .5 SOLUTION RESPIRATORY (INHALATION) at 08:34

## 2024-02-13 NOTE — BRIEF OP NOTE
Brief Postoperative Note      Patient: Sandor Early  YOB: 1955  MRN: [id number]    Date of Procedure: 2/12/2024    Pre-Op Diagnosis Codes:     * Closed Dupuytren's fracture dislocation of left ankle, initial encounter [S82.62XA]    Post-Op Diagnosis: Same       Procedure(s):  OPEN REDUCTION INTERNAL FIXATION OF LEFT ANKLE FRACTURE DISLOCATION WITH SYNDESMOSIS REPAIR    Surgeon(s):  Sudeep Ruth MD    Assistant: KATHERINE Arevalo.    Anesthesia: General    Estimated Blood Loss (mL): Minimal    Complications: None    Specimens:   * No specimens in log *    Implants:  Implant Name Type Inv. Item Serial No.  Lot No. LRB No. Used Action   SCREW BNE L16MM DIA3.5MM HEX HD DIA2.7MM DST LAT PERIARTC S - [id number]  SCREW BNE L16MM DIA3.5MM HEX HD DIA2.7MM DST LAT PERIARTC S  [employer]  Left 1 Implanted   SCREW BNE L56MM DIA3.5MM EDGARDO PERIARTC S STL ST - [id number]  SCREW BNE L56MM DIA3.5MM EDGARDO PERIARTC S STL ST  [employer]  Left 1 Implanted   SCREW BNE L20MM DIA2.7MM HEX HD DIA2.5MM CANC BIODUR 108C - [id number]  SCREW BNE L20MM DIA2.7MM HEX HD DIA2.5MM CANC BIODUR 108C  [employer]WD  Left 3 Implanted   SCREW BNE L18MM DIA2.7MM HEX HD DIA2.5MM CANC BIODUR 108C - [id number]  SCREW BNE L18MM DIA2.7MM HEX HD DIA2.5MM CANC BIODUR 108C  [employer]WD  Left 1 Implanted   SCREW BNE L16MM DIA2.7MM HEX HD DIA2.5MM CANC BIODUR 108C - [id number]  SCREW BNE L16MM DIA2.7MM HEX HD DIA2.5MM CANC BIODUR 108C  [employer]WD  Left 1 Implanted   SCREW BNE L50MM DIA3.5MM HD DIA2.7MM LNG PERIARTC ST - [id number]  SCREW BNE L50MM DIA3.5MM HD DIA2.7MM LNG PERIARTC ST  [employer]WD  Left 1 Implanted   PLATE BNE L80MM 4 H L LAT DST PERIARTC FIBULAR S STL CLARY - [id number]  PLATE BNE L80MM 4 H L LAT DST PERIARTC FIBULAR S STL CLARY  [employer]WD  Left 1 Implanted   SCREW BNE L16MM DIA2.7MM LNG EDGARDO FT ANK S STL ST - [id number]  SCREW BNE L16MM

## 2024-02-13 NOTE — PLAN OF CARE
Problem: Chronic Conditions and Co-morbidities  Goal: Patient's chronic conditions and co-morbidity symptoms are monitored and maintained or improved  Outcome: Progressing  Flowsheets (Taken 2/12/2024 2020)  Care Plan - Patient's Chronic Conditions and Co-Morbidity Symptoms are Monitored and Maintained or Improved:   Monitor and assess patient's chronic conditions and comorbid symptoms for stability, deterioration, or improvement   Collaborate with multidisciplinary team to address chronic and comorbid conditions and prevent exacerbation or deterioration   Update acute care plan with appropriate goals if chronic or comorbid symptoms are exacerbated and prevent overall improvement and discharge     Problem: Discharge Planning  Goal: Discharge to home or other facility with appropriate resources  Outcome: Progressing  Flowsheets (Taken 2/12/2024 2020)  Discharge to home or other facility with appropriate resources:   Identify barriers to discharge with patient and caregiver   Arrange for needed discharge resources and transportation as appropriate   Identify discharge learning needs (meds, wound care, etc)   Refer to discharge planning if patient needs post-hospital services based on physician order or complex needs related to functional status, cognitive ability or social support system     Problem: Safety - Adult  Goal: Free from fall injury  Outcome: Progressing  Flowsheets (Taken 2/13/2024 0455)  Free From Fall Injury: Instruct family/caregiver on patient safety     Problem: Pain  Goal: Verbalizes/displays adequate comfort level or baseline comfort level  Outcome: Progressing     Problem: ABCDS Injury Assessment  Goal: Absence of physical injury  Outcome: Progressing  Flowsheets (Taken 2/13/2024 0455)  Absence of Physical Injury: Implement safety measures based on patient assessment     Problem: Skin/Tissue Integrity  Goal: Absence of new skin breakdown  Description: 1.  Monitor for areas of redness and/or skin

## 2024-02-13 NOTE — OP NOTE
significant physical and mental effort. It required 100% more time for such procedure.     OPERATIVE PROCEDURE:  The patient's left ankle was marked.  He received  3 gm Ancef IV preoperatively given his size.  The patient was then  brought to the operating room and underwent general anesthesia.  A  well-padded tourniquet was placed, left upper calf.  The left lower  extremity was then prepped and draped in regular sterile routine  fashion.  A time-out was called confirming the patient's name, site, and  procedure.    Esmarch was used for exsanguination and tourniquet was inflated to 250  mmHg.  A lateral incision was made over the distal fibula.  The fracture  was exposed and found to be markedly displaced comminuted contracted  fracture.  It was significantly challenging, physically and mentally  very difficult to try to reduce the fracture dislocation with the  patient's size as well as because his chronicity of injury was at least  2 weeks old.  We carefully were able to mobilize the fracture and then  using K-wire and lamina , we were able to get the fibular back  to the appropriate height.  We provisionally stabilized the fracture  with bone clamps.  We then put the plate in appropriate position.  We  confirmed that the plate in good position, both AP and lateral plane.   We distracted the distal part using the plate as well as a bone clamp.   We were able to get the fibula out to appropriate anatomic length.   While maintaining the reduction, we placed a 3.5 cortical screw in the  shaft that stabilized the fracture anatomically in place.  We added two  more nonlocking screws and one locking screw in the shaft for more  stable fixation.  Distally, we were able to place total of five 2.7  locking screws and we added one more.  At this point, the fracture  dislocation is reduced and stabilized with the plate and screws.  We  then turned our attention to syndesmosis repair which was manually  reduced in

## 2024-02-13 NOTE — DISCHARGE INSTR - COC
History:   Immunization History   Administered Date(s) Administered    TDaP, ADACEL (age 10y-64y), BOOSTRIX (age 10y+), IM, 0.5mL 02/05/2021    Td, unspecified formulation 11/03/2014       Active Problems:  Principal Problem:    Closed fracture dislocation of left ankle joint  Active Problems:    Current smoker    Syndesmotic disruption of left ankle  Resolved Problems:    * No resolved hospital problems. *      Isolation/Infection:       Nurse Assessment:  Last Vital Signs:BP (!) 156/88   Pulse 92   Temp 97.7 °F (36.5 °C) (Oral)   Resp 16   Wt 109 kg (240 lb 4.8 oz)   SpO2 95%   BMI 37.64 kg/m²   Last documented pain score (0-10 scale): Pain Level: 5  Last Weight:   Wt Readings from Last 1 Encounters:   02/13/24 109 kg (240 lb 4.8 oz)     Mental Status:  oriented, alert, coherent, logical, thought processes intact, and able to concentrate and follow conversation     IV Access:  - None    Nursing Mobility/ADLs:  Walking   Assisted  Transfer  Assisted  Bathing  Assisted  Dressing  Assisted  Toileting  Assisted  Feeding  Independent  Med Admin  Assisted  Med Delivery   whole    Wound Care Documentation and Therapy:  Keep left ankle splint and ACE clean and dry, DO NOT remove         Elimination:  Urinary Catheter: Insertion Date: 2/13/24    Colostomy/Ileostomy: No  Continence:   Bowel: yes  Bladder: yes  Date of Last BM: 2/14/24    Intake/Output Summary (Last 24 hours)     Intake/Output Summary (Last 24 hours) at 2/13/2024 1031  Last data filed at 2/13/2024 0457  Gross per 24 hour   Intake 890 ml   Output 2950 ml   Net -2060 ml     Safety Concerns:     History of Falls (last 30 days) and At Risk for Falls    Impairments/Disabilities:      None    Nutrition Therapy:  Current Nutrition Therapy: ADULT ORAL NUTRITION SUPPLEMENT; HS Snack; Snack; as needed  ADULT DIET; Regular; 4 carb choices (60 gm/meal)  Routes of Feeding: Oral  Liquids: Thin Liquids  Daily Fluid Restriction: no  Last Modified Barium Swallow with

## 2024-02-14 VITALS
BODY MASS INDEX: 37.71 KG/M2 | WEIGHT: 240.74 LBS | RESPIRATION RATE: 17 BRPM | HEART RATE: 77 BPM | OXYGEN SATURATION: 96 % | SYSTOLIC BLOOD PRESSURE: 154 MMHG | DIASTOLIC BLOOD PRESSURE: 79 MMHG | TEMPERATURE: 98.3 F

## 2024-02-14 LAB
GLUCOSE BLD-MCNC: 111 MG/DL (ref 70–99)
GLUCOSE BLD-MCNC: 132 MG/DL (ref 70–99)
PERFORMED ON: ABNORMAL
PERFORMED ON: ABNORMAL

## 2024-02-14 PROCEDURE — 97530 THERAPEUTIC ACTIVITIES: CPT

## 2024-02-14 PROCEDURE — 6370000000 HC RX 637 (ALT 250 FOR IP): Performed by: NURSE PRACTITIONER

## 2024-02-14 PROCEDURE — 6360000002 HC RX W HCPCS: Performed by: HOSPITALIST

## 2024-02-14 PROCEDURE — 51702 INSERT TEMP BLADDER CATH: CPT

## 2024-02-14 PROCEDURE — 2580000003 HC RX 258: Performed by: NURSE PRACTITIONER

## 2024-02-14 PROCEDURE — 94640 AIRWAY INHALATION TREATMENT: CPT

## 2024-02-14 PROCEDURE — 2700000000 HC OXYGEN THERAPY PER DAY

## 2024-02-14 PROCEDURE — 94760 N-INVAS EAR/PLS OXIMETRY 1: CPT

## 2024-02-14 PROCEDURE — 97542 WHEELCHAIR MNGMENT TRAINING: CPT

## 2024-02-14 RX ORDER — POLYETHYLENE GLYCOL 3350 17 G/17G
17 POWDER, FOR SOLUTION ORAL DAILY
Qty: 30 PACKET | Refills: 0 | Status: SHIPPED | OUTPATIENT
Start: 2024-02-14 | End: 2024-03-15

## 2024-02-14 RX ADMIN — FOLIC ACID 1000 MCG: 1 TABLET ORAL at 08:47

## 2024-02-14 RX ADMIN — IPRATROPIUM BROMIDE AND ALBUTEROL SULFATE 1 DOSE: 2.5; .5 SOLUTION RESPIRATORY (INHALATION) at 13:18

## 2024-02-14 RX ADMIN — OXYCODONE HYDROCHLORIDE 10 MG: 10 TABLET ORAL at 08:47

## 2024-02-14 RX ADMIN — ENOXAPARIN SODIUM 30 MG: 100 INJECTION SUBCUTANEOUS at 08:47

## 2024-02-14 RX ADMIN — DULOXETINE HYDROCHLORIDE 60 MG: 60 CAPSULE, DELAYED RELEASE ORAL at 08:47

## 2024-02-14 RX ADMIN — GABAPENTIN 900 MG: 300 CAPSULE ORAL at 08:47

## 2024-02-14 RX ADMIN — METOPROLOL SUCCINATE 50 MG: 50 TABLET, EXTENDED RELEASE ORAL at 08:47

## 2024-02-14 RX ADMIN — GABAPENTIN 900 MG: 300 CAPSULE ORAL at 12:26

## 2024-02-14 RX ADMIN — MOMETASONE FUROATE AND FORMOTEROL FUMARATE DIHYDRATE 2 PUFF: 200; 5 AEROSOL RESPIRATORY (INHALATION) at 09:40

## 2024-02-14 RX ADMIN — ALLOPURINOL 300 MG: 300 TABLET ORAL at 08:47

## 2024-02-14 RX ADMIN — OXYCODONE HYDROCHLORIDE 10 MG: 10 TABLET ORAL at 16:41

## 2024-02-14 RX ADMIN — IPRATROPIUM BROMIDE AND ALBUTEROL SULFATE 1 DOSE: 2.5; .5 SOLUTION RESPIRATORY (INHALATION) at 09:40

## 2024-02-14 RX ADMIN — SODIUM CHLORIDE, PRESERVATIVE FREE 10 ML: 5 INJECTION INTRAVENOUS at 08:48

## 2024-02-14 RX ADMIN — PANTOPRAZOLE SODIUM 40 MG: 40 TABLET, DELAYED RELEASE ORAL at 05:48

## 2024-02-14 RX ADMIN — OXYCODONE HYDROCHLORIDE 10 MG: 10 TABLET ORAL at 12:26

## 2024-02-14 RX ADMIN — FERROUS SULFATE TAB EC 324 MG (65 MG FE EQUIVALENT) 325 MG: 324 (65 FE) TABLET DELAYED RESPONSE at 08:47

## 2024-02-14 RX ADMIN — DILTIAZEM HYDROCHLORIDE 180 MG: 180 CAPSULE, COATED, EXTENDED RELEASE ORAL at 08:47

## 2024-02-14 RX ADMIN — ATORVASTATIN CALCIUM 40 MG: 40 TABLET, FILM COATED ORAL at 08:47

## 2024-02-14 NOTE — PLAN OF CARE
Problem: Chronic Conditions and Co-morbidities  Goal: Patient's chronic conditions and co-morbidity symptoms are monitored and maintained or improved  Outcome: Progressing  Flowsheets (Taken 2/13/2024 2011)  Care Plan - Patient's Chronic Conditions and Co-Morbidity Symptoms are Monitored and Maintained or Improved:   Monitor and assess patient's chronic conditions and comorbid symptoms for stability, deterioration, or improvement   Collaborate with multidisciplinary team to address chronic and comorbid conditions and prevent exacerbation or deterioration   Update acute care plan with appropriate goals if chronic or comorbid symptoms are exacerbated and prevent overall improvement and discharge     Problem: Discharge Planning  Goal: Discharge to home or other facility with appropriate resources  2/14/2024 0006 by Adrián Sanchez, RN  Outcome: Progressing  2/13/2024 2101 by Julissa Dalal RN  Flowsheets  Taken 2/13/2024 2011 by Adrián Sanchez RN  Discharge to home or other facility with appropriate resources:   Identify barriers to discharge with patient and caregiver   Arrange for needed discharge resources and transportation as appropriate   Identify discharge learning needs (meds, wound care, etc)   Refer to discharge planning if patient needs post-hospital services based on physician order or complex needs related to functional status, cognitive ability or social support system  Taken 2/13/2024 1400 by Julissa Dalal RN  Discharge to home or other facility with appropriate resources:   Identify barriers to discharge with patient and caregiver   Identify discharge learning needs (meds, wound care, etc)   Arrange for needed discharge resources and transportation as appropriate   Refer to discharge planning if patient needs post-hospital services based on physician order or complex needs related to functional status, cognitive ability or social support system     Problem: Safety - Adult  Goal: Free from

## 2024-02-14 NOTE — PLAN OF CARE
Problem: Chronic Conditions and Co-morbidities  Goal: Patient's chronic conditions and co-morbidity symptoms are monitored and maintained or improved  2/14/2024 0917 by Musa Ruiz RN  Outcome: Progressing  Flowsheets (Taken 2/14/2024 0901)  Care Plan - Patient's Chronic Conditions and Co-Morbidity Symptoms are Monitored and Maintained or Improved: Monitor and assess patient's chronic conditions and comorbid symptoms for stability, deterioration, or improvement     Problem: Discharge Planning  Goal: Discharge to home or other facility with appropriate resources  2/14/2024 0917 by Musa Ruiz RN  Outcome: Progressing  Flowsheets (Taken 2/14/2024 0901)  Discharge to home or other facility with appropriate resources: Identify barriers to discharge with patient and caregiver     Problem: Safety - Adult  Goal: Free from fall injury  2/14/2024 0917 by Musa Ruiz RN  Outcome: Progressing     Problem: Pain  Goal: Verbalizes/displays adequate comfort level or baseline comfort level  2/14/2024 0917 by Musa Ruiz RN  Outcome: Progressing     Problem: ABCDS Injury Assessment  Goal: Absence of physical injury  2/14/2024 0917 by Musa Ruiz RN  Outcome: Progressing     Problem: Skin/Tissue Integrity  Goal: Absence of new skin breakdown  Description: 1.  Monitor for areas of redness and/or skin breakdown  2.  Assess vascular access sites hourly  3.  Every 4-6 hours minimum:  Change oxygen saturation probe site  4.  Every 4-6 hours:  If on nasal continuous positive airway pressure, respiratory therapy assess nares and determine need for appliance change or resting period.  2/14/2024 0917 by Musa Ruiz RN  Outcome: Progressing

## 2024-02-14 NOTE — PROGRESS NOTES
V2.0    [facility] [place] Note      Name:  Sandor Early /Age/Sex: 1955  (68 y.o. male)   MRN & CSN:  [id number] & [id number] Encounter Date/Time: 2024 9:27 AM EST   Location:  [id number] PCP: Cooper Martinez MD     Attending:Rayshawn Dodd MD       Hospital Day: 4    Assessment and Recommendations   Sandor Early is a  68 y.o. male with pmh of COPD, chronic respiratory failure with hypoxia on 4 L, alcoholic liver cirrhosis, hypertension, DM type II who presents to the ED with left ankle pain... Patient states that she woke up 13 days ago with left ankle pain, he does not recall any injury, denies being intoxicated..  He presented to the ED yesterday at [facility] and was noted to have an acute left ankle fracture..  He was transferred to [facility] at the orthopedic surgeons request for surgery which is scheduled for tomorrow  Endorses mild wheezing and cough over the past few days, no chest pain or fevers         Plan:    -Acute left ankle fracture     Scheduled for ORIF on Monday per orthopedics     -COPD with mild acute exacerbation, chronic respiratory failure hypoxia on baseline on 4 L nasal cannula at baseline-currently improved at baseline--continue bronchodilators, steroids     -Chronic pain syndrome with narcotic dependence-continue scheduled oxycodone +     -Alcoholic liver cirrhosis--currently stable     -Alcohol use disorder..  States he has significantly cut back and drinks only 1 sixpack per week     -Tobacco use disorder--does not want nicotine patch     -Diabetes mellitus type 2, insulin-dependent--continue basal bolus insulin     -Primary hypertension-stable-continue metoprolol, diltiazem     -Hyperlipidemia-continue statin     -History of iron deficiency anemia--continue iron supplementation     -Gout stable-continue allopurinol      Diet ADULT DIET; Regular; 4 carb choices (60 gm/meal)  Diet NPO Exceptions are: Sips of Water with Meds   DVT Prophylaxis [] 
    V2.0    [facility] [place] Progress Note      Name:  Sandor Early /Age/Sex: 1955  (68 y.o. male)   MRN & CSN:  [id number] & [id number] Encounter Date/Time: 2024 9:27 AM EST   Location:  [id number]/3107-01 PCP: Cooper Martinez MD     Attending:Rayshawn Dodd MD       Hospital Day: 2    Assessment and Recommendations   Sandor Early is a  68 y.o. male with pmh of COPD, chronic respiratory failure with hypoxia on 4 L, alcoholic liver cirrhosis, hypertension, DM type II who presents to the ED with left ankle pain... Patient states that she woke up 13 days ago with left ankle pain, he does not recall any injury, denies being intoxicated..  He presented to the ED yesterday at [facility] and was noted to have an acute left ankle fracture..  He was transferred to [facility] at the orthopedic surgeons request for surgery which is scheduled for tomorrow  Endorses mild wheezing and cough over the past few days, no chest pain or fevers         Plan:    -Acute left ankle fracture     Scheduled for ORIF on Monday per orthopedics     -COPD with mild acute exacerbation, chronic respiratory failure hypoxia on baseline on 4 L nasal cannula at baseline-currently improved at baseline--continue bronchodilators, steroids     -Chronic pain syndrome with narcotic dependence-continue scheduled oxycodone +     -Alcoholic liver cirrhosis--currently stable     -Alcohol use disorder..  States he has significantly cut back and drinks only 1 sixpack per week     -Tobacco use disorder--does not want nicotine patch     -Diabetes mellitus type 2, insulin-dependent--continue basal bolus insulin     -Primary hypertension-stable-continue metoprolol, diltiazem     -Hyperlipidemia-continue statin     -History of iron deficiency anemia--continue iron supplementation     -Gout stable-continue allopurinol      Diet ADULT DIET; Regular; 4 carb choices (60 gm/meal)  Diet NPO Exceptions are: Sips of Water with Meds   DVT Prophylaxis [] 
    V2.0    [facility] [place] Progress Note      Name:  Sandor Early /Age/Sex: 1955  (68 y.o. male)   MRN & CSN:  [id number] & [id number] Encounter Date/Time: 2024 9:27 AM EST   Location:  [id number] PCP: Cooper Martinez MD     Attending:Rayshawn Dodd MD       Hospital Day: 6    Assessment and Recommendations   Sandor Early is a  68 y.o. male with pmh of COPD, chronic respiratory failure with hypoxia on 4 L, alcoholic liver cirrhosis, hypertension, DM type II who presents to the ED with left ankle pain... Patient states that she woke up 13 days ago with left ankle pain, he does not recall any injury, denies being intoxicated..  He presented to the ED yesterday at [facility] and was noted to have an acute left ankle fracture..  He was transferred to [facility] at the orthopedic surgeons request for surgery which is scheduled for tomorrow  Endorses mild wheezing and cough over the past few days, no chest pain or fevers         Plan:    -Acute left ankle fracture     s/p ORIF   Continue PT and OT     -COPD with mild acute exacerbation, chronic respiratory failure hypoxia on baseline on 4 L nasal cannula at baseline-currently improved at baseline--continue bronchodilators, steroids     -Chronic pain syndrome with narcotic dependence-continue scheduled oxycodone +     -Alcoholic liver cirrhosis--currently stable     -Alcohol use disorder..  States he has significantly cut back and drinks only 1 sixpack per week     -Tobacco use disorder--does not want nicotine patch     -Diabetes mellitus type 2, insulin-dependent--continue basal bolus insulin     -Primary hypertension-stable-continue metoprolol, diltiazem     -Hyperlipidemia-continue statin     -History of iron deficiency anemia--continue iron supplementation     -Gout stable-continue allopurinol    Discharge planning..  PT and OT recommending home health but would like another day of evaluation while in the hospital prior to 
    V2.0    [facility] [place]      Name:  Sandor Early /Age/Sex: 1955  (68 y.o. male)   MRN & CSN:  [id number] & [id number] Encounter Date/Time: 2/10/2024 9:27 AM EST   Location:  [id number]/3107-01 PCP: Cooper Martinez MD     Attending:Rayshawn Dodd MD       Hospital Day: 3    Assessment and Recommendations   Sandor Early is a  68 y.o. male with pmh of COPD, chronic respiratory failure with hypoxia on 4 L, alcoholic liver cirrhosis, hypertension, DM type II who presents to the ED with left ankle pain... Patient states that she woke up 13 days ago with left ankle pain, he does not recall any injury, denies being intoxicated..  He presented to the ED yesterday at [facility] and was noted to have an acute left ankle fracture..  He was transferred to [facility] at the orthopedic surgeons request for surgery which is scheduled for tomorrow  Endorses mild wheezing and cough over the past few days, no chest pain or fevers         Plan:    -Acute left ankle fracture     Scheduled for ORIF on Monday per orthopedics     -COPD with mild acute exacerbation, chronic respiratory failure hypoxia on baseline on 4 L nasal cannula at baseline-currently improved at baseline--continue bronchodilators, steroids     -Chronic pain syndrome with narcotic dependence-continue scheduled oxycodone +     -Alcoholic liver cirrhosis--currently stable     -Alcohol use disorder..  States he has significantly cut back and drinks only 1 sixpack per week     -Tobacco use disorder--does not want nicotine patch     -Diabetes mellitus type 2, insulin-dependent--continue basal bolus insulin     -Primary hypertension-stable-continue metoprolol, diltiazem     -Hyperlipidemia-continue statin     -History of iron deficiency anemia--continue iron supplementation     -Gout stable-continue allopurinol      Diet ADULT DIET; Regular; 4 carb choices (60 gm/meal)  Diet NPO Exceptions are: Sips of Water with Meds   DVT Prophylaxis [] 
[facility] Orthopedic Surgery   Progress Note      S/P :  SUBJECTIVE  in recliner. Working with PT OT. Pain is not  described in left ankle at this time     OBJECTIVE              Physical                      VITALS:  BP (!) 156/88   Pulse 92   Temp 97.7 °F (36.5 °C) (Oral)   Resp 16   Wt 109 kg (240 lb 4.8 oz)   SpO2 95%   BMI 37.64 kg/m²                     MUSCULOSKELETAL:  Left toes warm and pink with brisk cap refill noted. Wiggle toes to command                   NEUROLOGIC:                                  Sensory:  Touch:  Left Lower Extremity:  abnormal - Mild intermittent tingling in left toes is reported.                                                  Surgical wound appears clean and dry left ankle with splint and ACE dressing.     Data       CBC:   Lab Results   Component Value Date/Time    WBC 7.7 02/12/2024 04:31 AM    RBC 4.68 02/12/2024 04:31 AM    HGB 14.5 02/12/2024 04:31 AM    HCT 43.1 02/12/2024 04:31 AM    MCV 92.1 02/12/2024 04:31 AM    MCH 31.0 02/12/2024 04:31 AM    MCHC 33.7 02/12/2024 04:31 AM    RDW 14.6 02/12/2024 04:31 AM     02/12/2024 04:31 AM    MPV 7.5 02/12/2024 04:31 AM        WBC:    Lab Results   Component Value Date/Time    WBC 7.7 02/12/2024 04:31 AM        Hemoglobin/Hematocrit:    Lab Results   Component Value Date/Time    HGB 14.5 02/12/2024 04:31 AM    HCT 43.1 02/12/2024 04:31 AM        PT/INR:    Lab Results   Component Value Date/Time    PROTIME 12.5 02/07/2024 04:41 PM    INR 0.93 02/07/2024 04:41 PM              Current Inpatient Medications             Current Facility-Administered Medications: sodium chloride flush 0.9 % injection 5-40 mL, 5-40 mL, IntraVENous, 2 times per day  sodium chloride flush 0.9 % injection 5-40 mL, 5-40 mL, IntraVENous, PRN  enoxaparin Sodium (LOVENOX) injection 30 mg, 30 mg, SubCUTAneous, BID  polyethylene glycol (GLYCOLAX) packet 17 g, 17 g, Oral, BID  sennosides-docusate sodium (SENOKOT-S) 8.6-50 MG tablet 2 tablet, 2 tablet, 
Checking on patient Q2H for nutrition needs, hygiene needs, comfort measures, mobility, fall risk interventions, and safe environment. All precautions and interventions in place. Educated patient on use of call light and telephone.  Patient verbalizes understanding. Call light/telephone in reach. Plan of care continues, no questions or concerns at this time.   
Checking on patient Q2H for nutrition needs, hygiene needs, comfort measures, mobility, fall risk interventions, and safe environment. All precautions and interventions in place. Educated patient on use of call light and telephone. Patient verbalizes understanding.No questions or concerns. Call light/telephone in reach. Plan of care continues.   
Delivered a [employer] O2 tank to the patient in room 3107. Made sure the patient knows how to operate the oxygen tank. Patient says he uses 2 lpm at home.    - Liliam Dowd is informed as well.  
Educated patient on purpose of 4 eyes skin assessment and asked patient if allowed to perform, patient verbalized understanding and agreed to assessment.    4 Eyes Skin Assessment     The patient is being assess for  Admission    I agree that 2 RN's have performed a thorough Head to Toe Skin Assessment on the patient. ALL assessment sites listed below have been assessed.       Areas assessed by both nurses:   [x]   Head, Face, and Ears   [x]   Shoulders, Back, and Chest  [x]   Arms, Elbows, and Hands   [x]   Coccyx, Sacrum, and IschIum  [x]   Legs, Feet, and Heels        Does the Patient have Skin Breakdown?  No         Abraham Prevention initiated:  No   Wound Care Orders initiated:  No      WOC nurse consulted for Pressure Injury (Stage 3,4, Unstageable, DTI, NWPT, and Complex wounds), New and Established Ostomies:  No      Nurse 1 eSignature: Electronically signed by Monika Marquez RN on 2/8/24 at 6:47 PM EST    **SHARE this note so that the co-signing nurse is able to place an eSignature**    Nurse 2 eSignature: Electronically signed by Zaira Mcmahon RN on 2/8/24 at 6:53 PM EST   
Medication Reconciliation    List of medications patient is currently taking is complete.     Source of information: 1. Conversation with patient at bedside                                      2. EPIC records      Notes regarding home medications:   1. Pt reported using 18 units of Lantus daily.      Janine Borja RPH   2/8/2024  3:35 PM   
Occupational Therapy  Facility/Department: [address] ORTHOPEDICS  Occupational Therapy Initial Assessment    Name: Sandor Early  : 1955  MRN: [id number]  Date of Service: 2024    Discharge Recommendations:  Patient would benefit from continued therapy after discharge, 2-3 sessions per week, 24 hour supervision or assist  OT Equipment Recommendations  Equipment Needed: Yes  Other: w/c       Sandor Ealry scored a 17/24 on the AM-PAC ADL Inpatient form. Current research shows that an AM-PAC score of 18 or greater is typically associated with a discharge to the patient's home setting. Based on the patient's AM-PAC score, and their current ADL deficits, it is recommended that the patient have 2-3 sessions per week of Occupational Therapy at d/c to increase the patient's independence.  At this time, this patient demonstrates the endurance and safety to discharge home with 24/7 assist and a follow up treatment frequency of 2-3x/wk.   Please see assessment section for further patient specific details.    If patient discharges prior to next session this note will serve as a discharge summary.  Please see below for the latest assessment towards goals.      Patient Diagnosis(es): There were no encounter diagnoses.  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  Past Surgical History:  has a past surgical history that includes 
Occupational Therapy  Facility/Department: [address] ORTHOPEDICS  Occupational Therapy Initial Assessment    Name: Sandor Early  : 1955  MRN: [id number]  Date of Service: 2024    Discharge Recommendations:  Patient would benefit from continued therapy after discharge, 2-3 sessions per week, 24 hour supervision or assist  OT Equipment Recommendations  Other: BSC? Pt to contact friend who could potentially give him one to borrow, will cont to assess.       Sandor Early scored a 16/24 on the AM-PAC ADL Inpatient form. Current research shows that an AM-PAC score of 18 or greater is typically associated with a discharge to the patient's home setting. Based on the patient's AM-PAC score, and their current ADL deficits, it is recommended that the patient have 2-3 sessions per week of Occupational Therapy at d/c to increase the patient's independence.  At this time, this patient demonstrates the endurance and safety to discharge home with initial 24/7 assist and a follow up treatment frequency of 2-3x/wk.   Please see assessment section for further patient specific details.    If patient discharges prior to next session this note will serve as a discharge summary.  Please see below for the latest assessment towards goals.        Patient Diagnosis(es): There were no encounter diagnoses.  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 
Patient alert and oriented x4, discharged to home with documented belongings. IV removed with no complications. Transported out of [facility] by wheelchair. Reviewed discharge, follow up, and medication instructions with patient and patient verbalized understanding.     Electronically signed by Musa Ruiz RN on 2/14/2024 at 5:46 PM    
Patient in bed, A&O X4. VSS. Positive pulses in RLE, able to wiggle toes, sensation intact. Pain medication administered as ordered.  Patient tolerating PO intake and appetite adequate.  Castrejon in place, urine clear, yellow, no odor.   
Patient in bed, A&O X4. VSS. Right PIV flushed, dressing CDI, NS locked and capped at this time. Patient reports pain of a 7/10 in his left ankle area. Ice applied for comfort. Positive pulses in RLE, able to wiggle toes, sensation intact.  Dressing CDI. Pain medication administered as ordered. All other AM medications taken without complaint (see eMAR).  Patient tolerating PO intake and appetite adequate. Patient requests glycolax with administration of pain medication for constipation symptoms. No other needs verbalized at this time. Standard safety precautions in place and call light within reach. Electronically signed by Monika Marquez RN on 2/9/2024 at 1020 AM.    
Patient is NPO for surgery, chlorhexidine bath done, gown and bed changed, chavira care done. Patient had not had a bowel movement just passing gas.   
Patient is alert & oriented x4, resting in bed, on 4L O2 NC. Currently on bedrest, awaiting surgery. Right forearm IV normal saline locked, flushed easily. 2/4 bed rails up, bed in lowest position, fall precautions in place, bed alarm on, call light within reach. Denies further needs at this time. Will cont to monitor and reassess.  Electronically signed by Zaira Mcmahon RN on 2/12/2024 at 8:39 AM    
Patient is alert & oriented x4, x1 assist with walker, 2/4 bed rails up, bed in lowest position, fall precautions in place, call light within reach. Morning medications given without complications. ACE wrap and splint remain in place on LLE and is clean, dry and intact. Pt remains on 4L of O2 nasal cannula (pt states his baseline at home is 2L O2 nasal cannula).     Electronically signed by Musa Ruiz RN on 2/14/2024 at 9:16 AM      
Patient reporting improved ain levels, says he is comfortable, chavira in place, chavira care provided, adequate clear urine. Patient got a skin tear on left elbow, was wrapped with non adhesive dressing and Kerlix. .   
Patient resting in bed. Oxycodone IR 10 mg given PO as scheduled for left leg/foot pain. Respirations regular and unlabored on O2 @ 4L per nasal cannula.  Fall precautions in place. Call light in reach.Electronically signed by Julissa Dalal RN on 2/13/2024 at 9:29 PM    
Patient resting in bed. Patient respirations regular and unlabored at rest with O2  on at 4L per nasal cannula . Left lower leg ace wrap/drsg dry and intact. Neuro checks WDL.  Patient denies need for pain medication at present time.  Fall precautions in place. Call light in reach.  Electronically signed by Julissa Dalal RN on 2/13/2024 at 9:23 PM    
Perfect Serve sent to MD Bishop regarding orders for this patient. Patient was directly admitted from [place] @ 1059. Ortho placed orders for diet and consent. Awaiting additional orders. Electronically signed by Monika Marquez RN on 2/8/2024 at 11:55 AM    
Physical Therapy  Facility/Department: [address] ORTHOPEDICS  Physical Therapy Initial Assessment  This note serves as patient discharge summary if pt discharges prior to next PT visit      Name: Sandor Early  : 1955  MRN: [id number]  Date of Service: 2024    Discharge Recommendations:  Continue to assess pending progress, Therapy recommended at discharge     Sandor Early scored a /24 on the AM-PAC short mobility form.     PT Equipment Recommendations  Equipment Needed: Yes  Mobility Devices: Wheelchair  Wheelchair: Standard  Other: 18 inch, with elevating leg rests.      Patient Diagnosis(es): There were no encounter diagnoses.  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  Past Surgical History:  has a past surgical history that includes bronchoscopy (2011); Elbow surgery (Left); Cystoscopy (2015); Cystoscopy (2014); Cystoscopy (2015); Cystoscopy (2016); Upper gastrointestinal endoscopy (N/A, 2018); Endoscopy, small bowel with ileum (N/A, 2019); Aorto-iliac Bypass Graft (N/A, 2020); Upper gastrointestinal endoscopy (N/A, 2022); Colonoscopy (N/A, 2018); Colonoscopy (N/A, 2018); Colonoscopy (N/A, 05/10/2022); Cataract removal (Left); and Ankle fracture surgery (Left, 2024).    Assessment   Body Structures, Functions, Activity Limitations 
Pt awake, oral airway removed.  Pt ZAMAN to request.   
Pt c/o of not having bm since 2/8. Notified MD in regard to pt concern. Rcvd n.o please see mar. New orders Implemented per md. Plan of care explained to resident with no question or concerns at this time.  
Pt resting quietly in bed watching tv.IVF flushed  per md orders without difficulty. No s/s of acute distress noted at this time. Appears comfortable. Call light is within reach. Plan of care continues, no questions or concerns at this time.      
Pt resting quietly in bed with eyes closed, resp easy and unlabored. No s/s of acute distress noted throughout this shift. Still on 4L O2 via NC. PIV patent and capped. Checking on patient Q2H for nutrition and hygiene needs, comfort measures, mobility, fall risk interventions, and safe environment. Standard safety precautions and interventions in place. Call light and bedside table within reach.      
Pt unable to urinate after chavira catheter removed. Perfect serve message sent to Ju regarding this. Dr. Dodd states to reinsert chavira catheter and have patient follow up with urology in 1 week. Pt and family informed of this as patient has a discharge order for today and his family is at bedside waiting to take patient home.     Gave patient number for the urology group to call for an appointment regarding urinary retention.    Electronically signed by Musa Ruiz RN on 2/14/2024 at 4:58 PM    
RT at bedside for breathing treatment. Pt 02 sat 90-93%.  Report to Robbie   
Ripn unable to voide, bladder scan at 999, message was sent to Ashanti Villafuerte, she advised to place a chavira.  Chavira was placed with clear yellow urine output a total of 1650. Patient was given chlorhexidine bath, gown and sheets changed.   
Sandor Early was evaluated today and a DME order was entered for a standard wheelchair because he requires this to successfully complete daily living tasks of eating, bathing, toileting, and hygiene.  A standard manual wheelchair is necessary due to patient's impaired ambulation and mobility restrictions and would be unable to resolve these daily living tasks using a cane or walker.  The patient is capable of using a standard wheelchair safely in their home and can maneuver within their home with adequate access.  There is a caregiver available to provide necessary assistance.  The need for this equipment was discussed with the patient and he understands, is in agreement, and has not expressed an unwillingness to use the wheelchair.        Sandor Early requires a bedside commode due to being confined to a single room, and is physically incapable of utilizing regular toilet facilities. Current body weight: Weight - Scale: 109.2 kg (240 lb 11.9 oz).    
The patients OARRS report has been reviewed online and any prescribing of pain related medications is based on our findings.The patient has been issued narcotics to safely reduce postoperative pain and promote tolerance with physical therapies and ADL's. Reduction in dosing will be addressed with the next narcotic refill request. Dosing is adjusted for patients with history of chronic pain disorders.        Per OARRS has #60 Oxycodone 10mg left at home. But if goes to ECF or rehab will need rx.   
To pacu from OR.  Pt asleep with oral airway in place.  Dressing to left lower leg dry and intact.  Toes to left foot warm with brisk cap refill.  IV infusing. Monitor in sinus rhythm.   
[___] : [unfilled]
PRN  allopurinol (ZYLOPRIM) tablet 300 mg, 300 mg, Oral, Daily  atorvastatin (LIPITOR) tablet 40 mg, 40 mg, Oral, Daily  dilTIAZem (CARDIZEM CD) extended release capsule 180 mg, 180 mg, Oral, Daily  DULoxetine (CYMBALTA) extended release capsule 60 mg, 60 mg, Oral, Daily  ferrous sulfate EC tablet 325 mg, 325 mg, Oral, Daily with breakfast  folic acid (FOLVITE) tablet 1,000 mcg, 1,000 mcg, Oral, Daily  gabapentin (NEURONTIN) capsule 900 mg, 900 mg, Oral, TID  insulin glargine (LANTUS) injection vial 18 Units, 18 Units, SubCUTAneous, Nightly  metoprolol succinate (TOPROL XL) extended release tablet 50 mg, 50 mg, Oral, Daily  pantoprazole (PROTONIX) tablet 40 mg, 40 mg, Oral, QAM AC  sodium chloride flush 0.9 % injection 5-40 mL, 5-40 mL, IntraVENous, 2 times per day  sodium chloride flush 0.9 % injection 5-40 mL, 5-40 mL, IntraVENous, PRN  0.9 % sodium chloride infusion, , IntraVENous, PRN  potassium chloride (KLOR-CON M) extended release tablet 40 mEq, 40 mEq, Oral, PRN **OR** potassium bicarb-citric acid (EFFER-K) effervescent tablet 40 mEq, 40 mEq, Oral, PRN **OR** potassium chloride 10 mEq/100 mL IVPB (Peripheral Line), 10 mEq, IntraVENous, PRN  magnesium sulfate 2000 mg in 50 mL IVPB premix, 2,000 mg, IntraVENous, PRN  ondansetron (ZOFRAN-ODT) disintegrating tablet 4 mg, 4 mg, Oral, Q8H PRN **OR** ondansetron (ZOFRAN) injection 4 mg, 4 mg, IntraVENous, Q6H PRN  acetaminophen (TYLENOL) tablet 650 mg, 650 mg, Oral, Q6H PRN **OR** acetaminophen (TYLENOL) suppository 650 mg, 650 mg, Rectal, Q6H PRN  morphine (PF) injection 2 mg, 2 mg, IntraVENous, Q4H PRN  glucose chewable tablet 16 g, 4 tablet, Oral, PRN  dextrose bolus 10% 125 mL, 125 mL, IntraVENous, PRN **OR** dextrose bolus 10% 250 mL, 250 mL, IntraVENous, PRN  glucagon injection 1 mg, 1 mg, SubCUTAneous, PRN  dextrose 10 % infusion, , IntraVENous, Continuous PRN  insulin lispro (HUMALOG) injection vial 0-8 Units, 0-8 Units, SubCUTAneous, TID WC  insulin lispro 
tolerated treatment well     Plan   Physical Therapy Plan  General Plan: 3-5 times per week  Current Treatment Recommendations: Functional mobility training, Wheelchair mobility training, Stair training  Safety Devices  Type of Devices: Left in chair, Call light within reach, Chair alarm in place, Nurse notified, Gait belt  Restraints  Restraints Initially in Place: No     Restrictions  Restrictions/Precautions  Restrictions/Precautions: Fall Risk, Weight Bearing  Lower Extremity Weight Bearing Restrictions  Left Lower Extremity Weight Bearing: Non Weight Bearing  Position Activity Restriction  Other position/activity restrictions: 4 L O2.  Wears 2 L at baseline     Subjective   General  Chart Reviewed: Yes  Patient assessed for rehabilitation services?: Yes  Additional Pertinent Hx: Per Dr. Ruth Op note 2-12-24: \"This is a 68-year-old white male who has diabetes, neuropathy, who presented to [facility] ER walking and his left ankle with increasing pain and swelling. He was found to have a left ankle fracture dislocation. The patient denied any specific history of injury, but gradually increasing pain and swelling. Apparently, the patient has neuropathy and he sustained injury without knowing.\" On 2-12-24 Dr. Ruth performs L ankle ORIF. Per op note: nonweightbearing for at least 6-8 weeks. Extensive PMHx as noted, including: COPD, chronic respiratory failure with hypoxia on 4 L, alcoholic liver cirrhosis, hypertension, DM type II  Response To Previous Treatment: Patient with no complaints from previous session.  Family / Caregiver Present: No  Referring Practitioner: Annalisa Christian APRN - CNP  Referral Date : 02/12/24  Subjective  Subjective: Patient in bed, awake.  Agreeable to therapy. Reports painful R knee \"needs to be replaced.\"         Social/Functional History  Social/Functional History  Lives With: Family (dtr in law and 3 grandkids had been staying with him temporarily before 
which could represent an additional small avulsion fracture fragment Soft Tissue: Soft tissue edema about the ankle predominantly medially.  Ankle tendons appear grossly intact.     1.  Medial dislocation of the tibia with respect to the talus by 1.3 cm. 2.  Avulsion fracture fragments adjacent to the medial malleolus. 3.  Displaced oblique fracture of the distal fibula at the level of the syndesmosis.     XR CHEST PORTABLE    Result Date: 2/8/2024  EXAMINATION: ONE XRAY VIEW OF THE CHEST 2/8/2024 4:47 pm COMPARISON: 09/10/2023 HISTORY: ORDERING SYSTEM PROVIDED HISTORY: COPD exacerbation TECHNOLOGIST PROVIDED HISTORY: Reason for exam:->COPD exacerbation Reason for Exam: COPD FINDINGS: The lungs appear clear.  The heart appears unremarkable.  There is a calcified granuloma in the the left mid lung zone.  There are no pulmonary infiltrates, CHF or pneumothorax.  Bony structures appear normal.     No acute cardiopulmonary process.     XR ANKLE LEFT (MIN 3 VIEWS)    Result Date: 2/7/2024  EXAMINATION: THREE XRAY VIEWS OF THE LEFT ANKLE 2/7/2024 3:24 pm COMPARISON: None. HISTORY: ORDERING SYSTEM PROVIDED HISTORY: swelling TECHNOLOGIST PROVIDED HISTORY: Reason for exam:->swelling Reason for Exam: leg pain and swelling,. no trauma FINDINGS: There is a displaced, oblique fracture of the distal left fibula.  There is marked widening of the medial clear space measuring up to 15 mm.  Suspected small avulsion fracture fragment arising from the lateral aspect of the talus.  Mild surrounding soft tissue swelling. There is a plantar calcaneal spur.  Vascular calcifications.     1. Displaced, oblique fracture of the distal left fibula. 2. Marked widening of the medial clear space consistent with ligamentous injury. 3. Suspected small avulsion fracture fragment arising from the lateral aspect of the talus.     XR TIBIA FIBULA LEFT (2 VIEWS)    Result Date: 2/7/2024  EXAMINATION: 2 XRAY VIEWS OF THE LEFT TIBIA AND FIBULA 2/7/2024

## 2024-02-14 NOTE — DISCHARGE INSTRUCTIONS
Call for increase in pain or swelling in operative extremity. Call for loss of movement or sensation in operative extremity. Call for fever over 101 F.    Keep ankle splint and ACE clean and dry DO NOT remove  Strict nonweightbearing on operative foot  Elevate ankle to reduce pain and swelling  Saeid Ruth in office in 2 weeks  [facility], [address], [address]   [address],   [phone]

## 2024-02-14 NOTE — CARE COORDINATION
DISCHARGE SUMMARY     DATE OF DISCHARGE: 2/14/24    DISCHARGE DESTINATION: Home      HOME CARE AGENCY: [facility]               PHONE NUMBER: [phone]             FAX NUMBER: [phone]    DME ORDERED: wheelchair / [employer]

## 2024-02-14 NOTE — DISCHARGE SUMMARY
V2.0  Discharge Summary    Name:  Sandor Early /Age/Sex: 1955 (68 y.o. male)   Admit Date: 2024  Discharge Date: 24    MRN & CSN:  [id number] & [id number] Encounter Date and Time 24 10:48 AM EST    Attending:  Rayshawn Dodd MD Discharging Provider: Rayshawn Dodd MD       Hospital Course:     Brief HPI: Sandor Early is a 68-year-old male with a history of COPD diabetes with neuropathy, alcoholic liver cirrhosis, hypertension, gout, presents with left-sided ankle swelling and pain over the past 2 weeks. Patient denies any injury but states that he had been drinking heavily the night before he noticed swelling in his ankle. He reports he woke up with the symptoms of pain and swelling.  Patient does not recall having any injuries    Brief Problem Based Course:     -Acute left ankle fracture     s/p ORIF   Continue pain management, DVT prophylaxis per Ortho,PT and OT at discharge     -COPD with mild acute exacerbation, chronic respiratory failure hypoxia on baseline on 4 L nasal cannula at baseline-currently improved at baseline--continue bronchodilators, steroids     -Chronic pain syndrome with narcotic dependence-continue scheduled oxycodone +     -Alcoholic liver cirrhosis--currently stable     -Alcohol use disorder..  States he has significantly cut back and drinks only 1 sixpack per week     -Tobacco use disorder--does not want nicotine patch     -Diabetes mellitus type 2, insulin-dependent--continue basal bolus insulin     -Primary hypertension-stable-continue metoprolol, diltiazem     -Hyperlipidemia-continue statin     -History of iron deficiency anemia--continue iron supplementation     -Gout stable-continue allopurinol    -Urine retention status post Castrejon placement      The patient expressed appropriate understanding of, and agreement with the discharge recommendations, medications, and plan.     Consults this admission:  None    Discharge Diagnosis:   Closed

## 2024-02-14 NOTE — PLAN OF CARE
Problem: Discharge Planning  Goal: Discharge to home or other facility with appropriate resources     Problem: Pain  Goal: Verbalizes/displays adequate comfort level or baseline comfort level     Problem: Safety - Adult  Goal: Free from fall injury

## 2024-02-15 ENCOUNTER — TELEPHONE (OUTPATIENT)
Dept: FAMILY MEDICINE CLINIC | Age: 69
End: 2024-02-15

## 2024-02-15 ENCOUNTER — TELEPHONE (OUTPATIENT)
Dept: ORTHOPEDIC SURGERY | Age: 69
End: 2024-02-15

## 2024-02-15 RX ORDER — FOLIC ACID 1 MG/1
TABLET ORAL
Qty: 30 TABLET | Refills: 5 | Status: SHIPPED | OUTPATIENT
Start: 2024-02-15

## 2024-02-15 RX ORDER — FUROSEMIDE 20 MG/1
TABLET ORAL
Qty: 30 TABLET | Refills: 5 | Status: SHIPPED | OUTPATIENT
Start: 2024-02-15

## 2024-02-15 NOTE — TELEPHONE ENCOUNTER
Other PATIENTS SISTER CALLED IN ON BEHALF OF PATIENT TO SCHEDULE AN APPT TO HAVE AN ANKLE BOOT MADE    LOIS STATES THE PATIENT BROKE HIS CELL PHONE SO TO CONTACT HER NUMBER   [phone]  TO SET UP APPT

## 2024-02-15 NOTE — TELEPHONE ENCOUNTER
[facility] Transitions Initial Follow Up Call    Outreach made within 2 business days of discharge: Yes    Patient: Sandor Early Patient : 1955   MRN: [id number]  Reason for Admission: There are no discharge diagnoses documented for the most recent discharge.  Discharge Date: 24     Spoke with: HASEEB full.  Discharge department/facility: [facility]  Non-face-to-face services provided:  Scheduled appointment with PCP-   Scheduled appointment with PCP within 7-14 days  Follow Up  Future Appointments   Date Time Provider Department Center   2024 10:40 AM [facility] [place] [facility]   2024  8:45 AM Sudeep Ruth MD AND ORTHO [facility]   2024 10:00 AM [facility] CT MAIN [facility] [place] [facility]   2024 10:30 AM Ben Medeiros MD [facility] P[facility] Marcelo RN

## 2024-02-15 NOTE — TELEPHONE ENCOUNTER
Refill Request     CONFIRM preferrred pharmacy with the patient.    If Mail Order Rx - Pend for 90 day refill.      Last Seen: Last Seen Department: 9/27/2023  Last Seen by PCP: 9/13/2023    Last Written: 1/9/24    If no future appointment scheduled, route STAFF MESSAGE with patient name to the  Pool for scheduling.      Next Appointment:   Future Appointments   Date Time Provider Department Center   2/19/2024 10:40 AM [facility] [place] [facility]   11/18/2024 10:00 AM [facility] [place] [facility]   11/19/2024 10:30 AM Ben Medeiros MD [facility]       Message sent to  to schedule appt with patient?  N/A      Requested Prescriptions     Pending Prescriptions Disp Refills    furosemide (LASIX) 20 MG tablet [Pharmacy Med Name: FUROSEMIDE 20MG TABLET] 30 tablet 5     Sig: TAKE ONE (1) TABLET BY MOUTH DAILY    folic acid (FOLVITE) 1 MG tablet [Pharmacy Med Name: FOLIC ACID 1000MCG TABLET] 30 tablet 5     Sig: TAKE ONE (1) TABLET BY MOUTH DAILY

## 2024-02-16 ENCOUNTER — TELEPHONE (OUTPATIENT)
Dept: ORTHOPEDIC SURGERY | Age: 69
End: 2024-02-16

## 2024-02-16 NOTE — TELEPHONE ENCOUNTER
Informed Raina that she needs to reach out to the patient's PCP to arrange a . That is not something we do in Orthopedics. She stated that she understood and agreed.

## 2024-02-16 NOTE — TELEPHONE ENCOUNTER
General Question     Subject: NAREN STATES A  NEEDS TO CALL AND SET UP THE Pt's TRANSPORTATIONI  Patient and /or Facility Request: Raina Emanuel  Contact Number: [phone]     RAINA, SISTER, IS TRYING TO SET UP TRANSPORT FOR THE Pt. SHE CALLED NAREN AND THEY CAN GET A STRETCHER VEHICLE TO TAKE HIM TO AND FROM HIS UPCOMING APPT, UNDER HIS PLAN, BUT INSURANCE TOLD RAINA THAT HIS  AT THE OFFICE NEEDS TO CALL AND ARRANGE THE TRANSPORT. RAINA, AS A FAMILY MEMBER, CAN'T ARRANGE IT. AT THE SUGGESTION THAT OFTEN A Pt WILL HAVE AN INSURANCE NURSE , RAINA STATES INSURANCE SAID IT HAS TO COME FROM THE PROVIDERS OFFICE.     PLEASE CALL TO ADVISE IF THIS IS A FAMILIAR PROCESS AND HOW TO GET THE Pt TO HIS 02/27/24 APPT.

## 2024-02-21 RX ORDER — ATORVASTATIN CALCIUM 40 MG/1
TABLET, FILM COATED ORAL
Qty: 30 TABLET | Refills: 2 | Status: SHIPPED | OUTPATIENT
Start: 2024-02-21

## 2024-02-26 RX ORDER — DILTIAZEM HYDROCHLORIDE 180 MG/1
CAPSULE, EXTENDED RELEASE ORAL
Qty: 90 CAPSULE | Refills: 0 | Status: SHIPPED | OUTPATIENT
Start: 2024-02-26

## 2024-02-27 ENCOUNTER — OFFICE VISIT (OUTPATIENT)
Dept: ORTHOPEDIC SURGERY | Age: 69
End: 2024-02-27

## 2024-02-27 VITALS — HEIGHT: 67 IN | BODY MASS INDEX: 37.67 KG/M2 | WEIGHT: 240 LBS

## 2024-02-27 DIAGNOSIS — F17.200 CURRENT SMOKER: ICD-10-CM

## 2024-02-27 DIAGNOSIS — S93.432A SYNDESMOTIC DISRUPTION OF LEFT ANKLE, INITIAL ENCOUNTER: ICD-10-CM

## 2024-02-27 DIAGNOSIS — S82.892A CLOSED FRACTURE DISLOCATION OF LEFT ANKLE, INITIAL ENCOUNTER: Primary | ICD-10-CM

## 2024-02-27 NOTE — PROGRESS NOTES
prescribed a [employer] Tall Jennifer Walking Boot.  The left ankle will require stabilization / immobilization from this semi-rigid / rigid orthosis to improve their function.  The orthosis will assist in protecting the affected area, provide functional support and facilitate healing.    Patient was instructed to progress ambulation  as non weight bearing in the device.  The plan of care is to progress the patient to full weight bearing status.     The patient was educated and fit by a healthcare professional with expert knowledge and specialization in brace application while under the direct supervision of the physician.  Verbal and written instructions for the use of and application of this item were provided.   They were instructed to contact the office immediately should the brace result in increased pain, decreased sensation, increased swelling or worsening of the condition.       Sudeep Ruth MD

## 2024-03-06 ENCOUNTER — TELEPHONE (OUTPATIENT)
Dept: FAMILY MEDICINE CLINIC | Age: 69
End: 2024-03-06

## 2024-03-06 NOTE — TELEPHONE ENCOUNTER
Patient is calling stating he fell about 6 days ago.  Skinned his L arm up and has a scab on it an has puss coming out of it and is red around it. His R arm skinned up on his elbow and it is infected and hurts.  Using antibiotic ointment, but it is not helping with the pain and states it was making it look like infection coming out.  Asking if Dr. Martinez will call in an antibiotic for him to [place] pharmacy.  Please call and advise.

## 2024-03-07 RX ORDER — SULFAMETHOXAZOLE AND TRIMETHOPRIM 800; 160 MG/1; MG/1
1 TABLET ORAL 2 TIMES DAILY
Qty: 14 TABLET | Refills: 0 | Status: CANCELLED | OUTPATIENT
Start: 2024-03-07 | End: 2024-03-14

## 2024-03-07 NOTE — TELEPHONE ENCOUNTER
Routing to PCP as medication was already approved to be sent in without an appt and patient was already informed of this   Notified PCP it is pending his approval

## 2024-03-20 ENCOUNTER — OFFICE VISIT (OUTPATIENT)
Dept: FAMILY MEDICINE CLINIC | Age: 69
End: 2024-03-20
Payer: MEDICARE

## 2024-03-20 VITALS
BODY MASS INDEX: 37.59 KG/M2 | HEART RATE: 108 BPM | DIASTOLIC BLOOD PRESSURE: 71 MMHG | HEIGHT: 67 IN | SYSTOLIC BLOOD PRESSURE: 123 MMHG | OXYGEN SATURATION: 86 %

## 2024-03-20 DIAGNOSIS — I85.10 SECONDARY ESOPHAGEAL VARICES WITHOUT BLEEDING (HCC): ICD-10-CM

## 2024-03-20 DIAGNOSIS — J96.21 ACUTE ON CHRONIC RESPIRATORY FAILURE WITH HYPOXIA (HCC): ICD-10-CM

## 2024-03-20 DIAGNOSIS — J41.8 MIXED SIMPLE AND MUCOPURULENT CHRONIC BRONCHITIS (HCC): Primary | ICD-10-CM

## 2024-03-20 DIAGNOSIS — E11.42 DIABETIC POLYNEUROPATHY ASSOCIATED WITH TYPE 2 DIABETES MELLITUS (HCC): ICD-10-CM

## 2024-03-20 DIAGNOSIS — I73.9 PAD (PERIPHERAL ARTERY DISEASE) (HCC): ICD-10-CM

## 2024-03-20 DIAGNOSIS — S82.892D CLOSED FRACTURE DISLOCATION OF LEFT ANKLE WITH ROUTINE HEALING, SUBSEQUENT ENCOUNTER: ICD-10-CM

## 2024-03-20 DIAGNOSIS — J96.21 ACUTE AND CHRONIC RESPIRATORY FAILURE WITH HYPOXIA (HCC): ICD-10-CM

## 2024-03-20 DIAGNOSIS — K70.10 CHRONIC ALCOHOLIC HEPATITIS: ICD-10-CM

## 2024-03-20 DIAGNOSIS — I10 BENIGN ESSENTIAL HTN: ICD-10-CM

## 2024-03-20 PROCEDURE — G8484 FLU IMMUNIZE NO ADMIN: HCPCS | Performed by: FAMILY MEDICINE

## 2024-03-20 PROCEDURE — G8417 CALC BMI ABV UP PARAM F/U: HCPCS | Performed by: FAMILY MEDICINE

## 2024-03-20 PROCEDURE — 2022F DILAT RTA XM EVC RTNOPTHY: CPT | Performed by: FAMILY MEDICINE

## 2024-03-20 PROCEDURE — 1036F TOBACCO NON-USER: CPT | Performed by: FAMILY MEDICINE

## 2024-03-20 PROCEDURE — 3023F SPIROM DOC REV: CPT | Performed by: FAMILY MEDICINE

## 2024-03-20 PROCEDURE — 3074F SYST BP LT 130 MM HG: CPT | Performed by: FAMILY MEDICINE

## 2024-03-20 PROCEDURE — 1123F ACP DISCUSS/DSCN MKR DOCD: CPT | Performed by: FAMILY MEDICINE

## 2024-03-20 PROCEDURE — 3078F DIAST BP <80 MM HG: CPT | Performed by: FAMILY MEDICINE

## 2024-03-20 PROCEDURE — G8427 DOCREV CUR MEDS BY ELIG CLIN: HCPCS | Performed by: FAMILY MEDICINE

## 2024-03-20 PROCEDURE — 99214 OFFICE O/P EST MOD 30 MIN: CPT | Performed by: FAMILY MEDICINE

## 2024-03-20 PROCEDURE — 3044F HG A1C LEVEL LT 7.0%: CPT | Performed by: FAMILY MEDICINE

## 2024-03-20 PROCEDURE — 3017F COLORECTAL CA SCREEN DOC REV: CPT | Performed by: FAMILY MEDICINE

## 2024-03-20 RX ORDER — TAMSULOSIN HYDROCHLORIDE 0.4 MG/1
CAPSULE ORAL
COMMUNITY

## 2024-03-20 RX ORDER — EZETIMIBE 10 MG/1
TABLET ORAL
COMMUNITY

## 2024-03-20 SDOH — ECONOMIC STABILITY: FOOD INSECURITY: WITHIN THE PAST 12 MONTHS, THE FOOD YOU BOUGHT JUST DIDN'T LAST AND YOU DIDN'T HAVE MONEY TO GET MORE.: NEVER TRUE

## 2024-03-20 SDOH — ECONOMIC STABILITY: FOOD INSECURITY: WITHIN THE PAST 12 MONTHS, YOU WORRIED THAT YOUR FOOD WOULD RUN OUT BEFORE YOU GOT MONEY TO BUY MORE.: NEVER TRUE

## 2024-03-20 SDOH — ECONOMIC STABILITY: INCOME INSECURITY: HOW HARD IS IT FOR YOU TO PAY FOR THE VERY BASICS LIKE FOOD, HOUSING, MEDICAL CARE, AND HEATING?: NOT HARD AT ALL

## 2024-03-20 ASSESSMENT — PATIENT HEALTH QUESTIONNAIRE - PHQ9
SUM OF ALL RESPONSES TO PHQ QUESTIONS 1-9: 0
1. LITTLE INTEREST OR PLEASURE IN DOING THINGS: NOT AT ALL
2. FEELING DOWN, DEPRESSED OR HOPELESS: NOT AT ALL
SUM OF ALL RESPONSES TO PHQ QUESTIONS 1-9: 0
SUM OF ALL RESPONSES TO PHQ QUESTIONS 1-9: 0
SUM OF ALL RESPONSES TO PHQ9 QUESTIONS 1 & 2: 0
SUM OF ALL RESPONSES TO PHQ QUESTIONS 1-9: 0

## 2024-03-20 NOTE — PROGRESS NOTES
WHEEZING 8.5 g 5    ipratropium 0.5 mg-albuterol 2.5 mg (DUONEB) 0.5-2.5 (3) MG/3ML SOLN nebulizer solution Inhale 3 mLs into the lungs every 4 hours as needed for Shortness of Breath 360 mL 2    dilTIAZem (CARDIZEM CD) 180 MG extended release capsule Take 1 capsule by mouth daily 90 capsule 0    ondansetron (ZOFRAN) 4 MG tablet TAKE ONE TABLET BY MOUTH EVERY 6 HOURS AS NEEDED FOR NAUSEA OR VOMITING 30 tablet 0    oxyCODONE HCl (OXY-IR) 10 MG immediate release tablet Take 1 tablet by mouth in the morning, at noon, in the evening, and at bedtime.      gabapentin (NEURONTIN) 300 MG capsule Take 3 capsules by mouth 3 times daily. Taking 900mg tid          Medications patient taking as of now reconciled against medications ordered at time of hospital discharge: Completed    A comprehensive review of systems was negative except for what was noted in the HPI.             An electronic signature was used to authenticate this note.  --Neela Ramires RN        I, Dr. GEOVANI Martinez, personally performed the services described in this documentation, as scribed by the above signed scribe in my presence, and it is both accurate and complete. I agree with the ROS and Past Histories independently gathered by the clinical support staff and the remaining scribed note accurately describes my personal service to the patient.      3/20/2024    3:42 PM

## 2024-03-27 DIAGNOSIS — I10 BENIGN ESSENTIAL HTN: ICD-10-CM

## 2024-03-27 DIAGNOSIS — I10 UNCONTROLLED HYPERTENSION: ICD-10-CM

## 2024-03-27 RX ORDER — METOPROLOL SUCCINATE 50 MG/1
50 TABLET, EXTENDED RELEASE ORAL DAILY
Qty: 30 TABLET | Refills: 2 | Status: SHIPPED | OUTPATIENT
Start: 2024-03-27

## 2024-03-27 NOTE — TELEPHONE ENCOUNTER
Refill Request     CONFIRM preferrred pharmacy with the patient.    If Mail Order Rx - Pend for 90 day refill.      Last Seen: Last Seen Department: 3/20/2024  Last Seen by PCP: 9/27/2023    Last Written: 12/18/23    If no future appointment scheduled, route STAFF MESSAGE with patient name to the  Pool for scheduling.      Next Appointment:   Future Appointments   Date Time Provider Department Center   4/9/2024  9:15 AM Sudeep Ruth MD AND ORTHO MMA   6/24/2024 10:00 AM [facility] [place] [facility]   11/18/2024 10:00 AM [facility] CT MAIN [facility] CT [facility] [place] [facility]   11/19/2024 10:30 AM Ben Medeiros MD [facility]       Message sent to  to schedule appt with patient?  YES      Requested Prescriptions     Pending Prescriptions Disp Refills    metoprolol succinate (TOPROL XL) 50 MG extended release tablet [Pharmacy Med Name: METOPROLOL SUCCINATE ER 50MG ER TABLET ER 24HR] 30 tablet 2     Sig: TAKE ONE TABLET BY MOUTH EVERY DAY

## 2024-04-09 ENCOUNTER — OFFICE VISIT (OUTPATIENT)
Dept: ORTHOPEDIC SURGERY | Age: 69
End: 2024-04-09
Payer: MEDICARE

## 2024-04-09 DIAGNOSIS — F17.200 CURRENT SMOKER: ICD-10-CM

## 2024-04-09 DIAGNOSIS — S82.892A CLOSED FRACTURE DISLOCATION OF LEFT ANKLE, INITIAL ENCOUNTER: Primary | ICD-10-CM

## 2024-04-09 PROCEDURE — 99024 POSTOP FOLLOW-UP VISIT: CPT | Performed by: ORTHOPAEDIC SURGERY

## 2024-04-09 PROCEDURE — 99406 BEHAV CHNG SMOKING 3-10 MIN: CPT | Performed by: ORTHOPAEDIC SURGERY

## 2024-04-09 RX ORDER — CEPHALEXIN 500 MG/1
500 CAPSULE ORAL 4 TIMES DAILY
Qty: 56 CAPSULE | Refills: 0 | Status: SHIPPED | OUTPATIENT
Start: 2024-04-09 | End: 2024-04-23

## 2024-04-09 RX ORDER — SULFAMETHOXAZOLE AND TRIMETHOPRIM 400; 80 MG/1; MG/1
1 TABLET ORAL 2 TIMES DAILY
Qty: 28 TABLET | Refills: 0 | Status: SHIPPED | OUTPATIENT
Start: 2024-04-09 | End: 2024-04-23

## 2024-04-09 NOTE — PROGRESS NOTES
DIAGNOSIS:  Left ankle unstable fracture dislocation, status post ORIF, with syndesmosis repair.    DATE OF SURGERY:  2/12/2024.    HISTORY OF PRESENT ILLNESS:  Mr. Early 68 y.o.  male who came in today for 2 months postoperative visit.  The patient denies any significant pain in the left ankle, 5/10. He has been in a splint, and non WB.  No numbness or tingling sensation. No fever or Chills.     PHYSICAL EXAMINATION:  The incision healing but there is an open area distally.  No signs of any erythema or drainage, minimal swelling. He has no pain with the active or passive range of motion of the left ankle, but decrease ROM.  He has intact sensation distally, and he is neurovascularly intact.        IMAGING:  Three views left ankle showed anatomic alignment of the fracture, plate and screws and syndesmosis screw in good position, no loosening. Ankle mortise is well centered.    IMPRESSION:  2 months out from left ankle unstable fracture dislocation ORIF with syndesmosis repair, and doing very well.    PLAN: He will be WBAT in the boot, and start aggressive ROM and peroneal strengthening exercise. Off the boot in 2-3 weeks. Keflex and bactrim for 2 weeks. The patient will come back for a follow up in 6 weeks.  At that time, we will take 3 views of the left ankle standing. He will need a staged procedure with syndesmosis screw removal 4-5 months postop.    The patient smokes cigarettes, and we discussed with the patient the risks of smoking on general health and also on bone and soft tissue healing (delay and non-union), and promised to cut down or stop smoking.     Smoking: Educated the patient regarding the hazards of smoking and that it harms their body in many ways. It increases the chance of developing heart disease, lung disease, cancer, and other health problems including poor bone and wound healing.    The importance of smoking cessation for optimal bone and wound healing was stressed. This was

## 2024-04-22 RX ORDER — ATORVASTATIN CALCIUM 40 MG/1
TABLET, FILM COATED ORAL
Qty: 30 TABLET | Refills: 2 | Status: SHIPPED | OUTPATIENT
Start: 2024-04-22

## 2024-04-23 RX ORDER — INSULIN GLARGINE 100 [IU]/ML
INJECTION, SOLUTION SUBCUTANEOUS
Qty: 15 ML | Refills: 0 | Status: SHIPPED | OUTPATIENT
Start: 2024-04-23

## 2024-04-23 NOTE — TELEPHONE ENCOUNTER
Refill Request     CONFIRM preferrred pharmacy with the patient.    If Mail Order Rx - Pend for 90 day refill.      Last Seen: Last Seen Department: 3/20/2024  Last Seen by PCP: 3/20/2024    Last Written: 1/30/24    If no future appointment scheduled, route STAFF MESSAGE with patient name to the  Pool for scheduling.      Next Appointment:   Future Appointments   Date Time Provider Department Center   5/21/2024 10:00 AM Sudeep Ruth MD AND ORTHO [facility]   6/24/2024 10:00 AM [facility] [place] [facility]   11/18/2024 10:00 AM [facility] CT MAIN [facility] CT [facility] [place] [facility]   11/19/2024 10:30 AM Ben Medeiros MD [facility]       Message sent to  to schedule appt with patient?  N/A      Requested Prescriptions     Pending Prescriptions Disp Refills    LANTUS SOLOSTAR 100 UNIT/ML injection pen [Pharmacy Med Name: LANTUS SOLOSTAR 100/ML SOLN PEN-INJ] 15 mL 0     Sig: INJECT 25 UNITS INTO THE SKIN DAILY WITH SUPPER

## 2024-05-06 RX ORDER — ALLOPURINOL 300 MG/1
TABLET ORAL
Qty: 30 TABLET | Refills: 11 | Status: SHIPPED | OUTPATIENT
Start: 2024-05-06

## 2024-05-06 NOTE — TELEPHONE ENCOUNTER
Refill Request     CONFIRM preferrred pharmacy with the patient.    If Mail Order Rx - Pend for 90 day refill.      Last Seen: Last Seen Department: 3/20/2024  Last Seen by PCP: 3/20/2024    Last Written: 1-    If no future appointment scheduled, route STAFF MESSAGE with patient name to the  Pool for scheduling.      Next Appointment:   Future Appointments   Date Time Provider Department Center   5/21/2024 10:00 AM Sudeep Ruth MD AND ORTHO MMA   6/24/2024 10:00 [facility] [place] [facility]   11/18/2024 10:00 AM MHC CT MAIN [facility] CT [facility] [place] [facility]   11/19/2024 10:30 AM Ben Medeiros MD [facility]       Message sent to  to schedule appt with patient?  N/A      Requested Prescriptions     Pending Prescriptions Disp Refills    allopurinol (ZYLOPRIM) 300 MG tablet [Pharmacy Med Name: ALLOPURINOL 300MG TABLET] 30 tablet 2     Sig: TAKE ONE (1) TABLET BY MOUTH DAILY

## 2024-05-08 DIAGNOSIS — E55.9 VITAMIN D DEFICIENCY: ICD-10-CM

## 2024-05-08 NOTE — TELEPHONE ENCOUNTER
Refill Request     CONFIRM preferrred pharmacy with the patient.    If Mail Order Rx - Pend for 90 day refill.      Last Seen: Last Seen Department: 3/20/2024  Last Seen by PCP: 9/27/2023    Last Written: 6/22/23    If no future appointment scheduled, route STAFF MESSAGE with patient name to the  Pool for scheduling.      Next Appointment:   Future Appointments   Date Time Provider Department Center   5/21/2024 10:00 AM Sudeep Ruth MD AND ORTHO [facility]   6/24/2024 10:00 AM [facility] [place] [facility]   11/18/2024 10:00 AM MHC CT MAIN [facility] CT SC [place] Rad   11/19/2024 10:30 AM Ben Medeiros MD [facility]       Message sent to  to schedule appt with patient?  N/A      Requested Prescriptions     Pending Prescriptions Disp Refills    ondansetron (ZOFRAN) 4 MG tablet [Pharmacy Med Name: ONDANSETRON HYDROCHLORIDE 4MG TABLET] 30 tablet 0     Sig: TAKE ONE TABLET BY MOUTH EVERY SIX (6) HOURS AS NEEDED FOR NAUSEA OR VOMITING

## 2024-05-08 NOTE — TELEPHONE ENCOUNTER
Refill Request     CONFIRM preferrred pharmacy with the patient.    If Mail Order Rx - Pend for 90 day refill.      Last Seen: Last Seen Department: 3/20/2024  Last Seen by PCP: 3/20/2024    Last Written: 1/30/24    If no future appointment scheduled, route STAFF MESSAGE with patient name to the  Pool for scheduling.      Next Appointment:   Future Appointments   Date Time Provider Department Center   5/21/2024 10:00 AM Sudeep Ruth MD AND ORTHO MMA   6/24/2024 10:00 AM [facility] [place] [facility]   11/18/2024 10:00 AM MHC CT MAIN [facility] CT SC [place] [facility]   11/19/2024 10:30 AM Ben Medeiros MD [facility]       Message sent to  to schedule appt with patient?  N/A      Requested Prescriptions     Pending Prescriptions Disp Refills    Ferrous Sulfate (IRON) 325 (65 Fe) MG TABS [Pharmacy Med Name: IRON 325MG TABLET] 30 tablet 2     Sig: TAKE ONE (1) TABLET BY MOUTH DAILY WITH BREAKFAST    Cholecalciferol (VITAMIN D3) 125 MCG (5000 UT) CAPS [Pharmacy Med Name: VITAMIN D3 5000UNIT CAPSULE] 90 capsule 0     Sig: TAKE ONE (1) CAPSULE BY MOUTH DAILY

## 2024-05-09 RX ORDER — ONDANSETRON 4 MG/1
TABLET, FILM COATED ORAL
Qty: 30 TABLET | Refills: 0 | Status: SHIPPED | OUTPATIENT
Start: 2024-05-09

## 2024-05-09 RX ORDER — PNV NO.95/FERROUS FUM/FOLIC AC 28MG-0.8MG
TABLET ORAL
Qty: 90 TABLET | Refills: 0 | Status: SHIPPED | OUTPATIENT
Start: 2024-05-09

## 2024-05-13 DIAGNOSIS — E11.42 DIABETIC POLYNEUROPATHY ASSOCIATED WITH TYPE 2 DIABETES MELLITUS (HCC): ICD-10-CM

## 2024-05-13 DIAGNOSIS — F41.8 SITUATIONAL ANXIETY: ICD-10-CM

## 2024-05-13 DIAGNOSIS — F41.9 ANXIETY: ICD-10-CM

## 2024-05-13 RX ORDER — DULOXETIN HYDROCHLORIDE 60 MG/1
CAPSULE, DELAYED RELEASE ORAL
Qty: 90 CAPSULE | Refills: 1 | Status: SHIPPED | OUTPATIENT
Start: 2024-05-13

## 2024-05-13 NOTE — TELEPHONE ENCOUNTER
Refill Request     CONFIRM preferrred pharmacy with the patient.    If Mail Order Rx - Pend for 90 day refill.      Last Seen: Last Seen Department: 3/20/2024  Last Seen by PCP: 3/20/2024    Last Written: 1/30/24    If no future appointment scheduled, route STAFF MESSAGE with patient name to the  Pool for scheduling.      Next Appointment:   Future Appointments   Date Time Provider Department Center   5/21/2024 10:00 AM Sudeep Ruth MD [facility]   6/24/2024 10:00 AM [facility] [place] [facility]   11/18/2024 10:00 AM MHC CT MAIN [facility] CT [facility] [place] [facility]   11/19/2024 10:30 AM Ben Medeiros MD [facility]       Message sent to  to schedule appt with patient?  N/A      Requested Prescriptions     Pending Prescriptions Disp Refills    DULoxetine (CYMBALTA) 60 MG extended release capsule [Pharmacy Med Name: DULOXETINE HYDROCHLORIDE 60MG CAPSULE DR PART] 30 capsule 11     Sig: TAKE ONE CAPSULE BY MOUTH EVERY DAY

## 2024-05-21 ENCOUNTER — OFFICE VISIT (OUTPATIENT)
Dept: ORTHOPEDIC SURGERY | Age: 69
End: 2024-05-21
Payer: MEDICARE

## 2024-05-21 VITALS — BODY MASS INDEX: 37.67 KG/M2 | WEIGHT: 240 LBS | HEIGHT: 67 IN

## 2024-05-21 DIAGNOSIS — S93.432A SYNDESMOTIC DISRUPTION OF LEFT ANKLE, INITIAL ENCOUNTER: ICD-10-CM

## 2024-05-21 DIAGNOSIS — S82.892A CLOSED FRACTURE DISLOCATION OF LEFT ANKLE, INITIAL ENCOUNTER: Primary | ICD-10-CM

## 2024-05-21 PROCEDURE — 1123F ACP DISCUSS/DSCN MKR DOCD: CPT | Performed by: ORTHOPAEDIC SURGERY

## 2024-05-21 PROCEDURE — 99213 OFFICE O/P EST LOW 20 MIN: CPT | Performed by: ORTHOPAEDIC SURGERY

## 2024-05-21 PROCEDURE — G8427 DOCREV CUR MEDS BY ELIG CLIN: HCPCS | Performed by: ORTHOPAEDIC SURGERY

## 2024-05-21 PROCEDURE — G8417 CALC BMI ABV UP PARAM F/U: HCPCS | Performed by: ORTHOPAEDIC SURGERY

## 2024-05-21 PROCEDURE — 3017F COLORECTAL CA SCREEN DOC REV: CPT | Performed by: ORTHOPAEDIC SURGERY

## 2024-05-21 PROCEDURE — 1036F TOBACCO NON-USER: CPT | Performed by: ORTHOPAEDIC SURGERY

## 2024-05-21 RX ORDER — INSULIN GLARGINE 100 [IU]/ML
INJECTION, SOLUTION SUBCUTANEOUS
Qty: 15 ML | Refills: 3 | Status: SHIPPED | OUTPATIENT
Start: 2024-05-21

## 2024-05-21 NOTE — TELEPHONE ENCOUNTER
Refill Request     CONFIRM preferrred pharmacy with the patient.    If Mail Order Rx - Pend for 90 day refill.      Last Seen: Last Seen Department: 3/20/2024  Last Seen by PCP: 3/20/2024    Last Written: 4/23/24    If no future appointment scheduled, route STAFF MESSAGE with patient name to the  Pool for scheduling.      Next Appointment:   Future Appointments   Date Time Provider Department Center   6/24/2024 10:00 AM [facility] [place] [facility]   6/25/2024  9:00 AM Sudeep Ruth MD [facility]   11/18/2024 10:00 AM MHC CT MAIN [facility] CT [facility] [place] [facility]   11/19/2024 10:30 AM Ben Medeiros MD [facility]       Message sent to  to schedule appt with patient?  N/A      Requested Prescriptions     Pending Prescriptions Disp Refills    insulin glargine (LANTUS SOLOSTAR) 100 UNIT/ML injection pen 15 mL 3     Sig: INJECT 25 UNITS INTO THE SKIN DAILY WITH SUPPER

## 2024-05-21 NOTE — PROGRESS NOTES
daily 90 capsule 0    oxyCODONE HCl (OXY-IR) 10 MG immediate release tablet Take 1 tablet by mouth in the morning, at noon, in the evening, and at bedtime.      gabapentin (NEURONTIN) 300 MG capsule Take 3 capsules by mouth 3 times daily. Taking 900mg tid       No current facility-administered medications on file prior to visit.       Pertinent items are noted in HPI  Review of systems reviewed from Patient History Form and available in the patient's chart under the Media tab. No change noted.      PHYSICAL EXAMINATION:  Mr. Early is a very pleasant 68 y.o.  male who presents today in no acute distress, awake, alert, and oriented.  He is well dressed, nourished and  groomed.  Patient with normal affect.  Height is  1.702 m (5' 7\"), weight is 108.9 kg (240 lb), Body mass index is 37.59 kg/m².  Resting respiratory rate is 16.     The patient walks with minimal limp. The incision healing.  No signs of any erythema or drainage, minimal swelling. He has no pain with the active or passive range of motion of the left ankle, but mild decrease ROM.  He has intact sensation distally, and he is neurovascularly intact.        IMAGING:  Three views left ankle showed anatomic alignment of the fracture, plate and screws and syndesmosis screw in good position, no loosening. Ankle mortise is well centered.    IMPRESSION:  3 months out from left ankle unstable fracture dislocation ORIF with syndesmosis repair, and doing very well.    PLAN: He will be WBAT, and start aggressive ROM and peroneal strengthening exercise.  NSAIDs OTC.  The patient will come back for a follow up in 4 weeks.  At that time, we will take 3 views of the left ankle standing. He will need a staged procedure with 2 syndesmosis screws removal 4-5 months postop.      Sudeep Ruth MD

## 2024-05-22 RX ORDER — PEN NEEDLE, DIABETIC 31 GX5/16"
NEEDLE, DISPOSABLE MISCELLANEOUS
Qty: 100 EACH | Refills: 5 | Status: SHIPPED | OUTPATIENT
Start: 2024-05-22

## 2024-05-22 RX ORDER — DILTIAZEM HYDROCHLORIDE 180 MG/1
CAPSULE, EXTENDED RELEASE ORAL
Qty: 90 CAPSULE | Refills: 0 | Status: SHIPPED | OUTPATIENT
Start: 2024-05-22

## 2024-05-22 NOTE — TELEPHONE ENCOUNTER
Refill Request     CONFIRM preferrred pharmacy with the patient.    If Mail Order Rx - Pend for 90 day refill.      Last Seen: Last Seen Department: 3/20/2024  Last Seen by PCP: 3/20/2024    Last Written: tiazac- 2/26/24, pen tips- 10/31/23    If no future appointment scheduled, route STAFF MESSAGE with patient name to the  Pool for scheduling.      Next Appointment:   Future Appointments   Date Time Provider Department Center   6/24/2024 10:00 AM [facility] [place] [facility]   6/25/2024  9:00 AM Sudeep Ruth MD [facility]   11/18/2024 10:00 AM MHC CT MAIN [facility] CT [facility] [place] [facility]   11/19/2024 10:30 AM Ben Medeiros MD [facility]       Message sent to  to schedule appt with patient?  N/A      Requested Prescriptions     Pending Prescriptions Disp Refills    UNIFINE PENTIPS 31G X 8 MM MISC [Pharmacy Med Name: UNIFINE PENTIPS [id number] MISC] 100 each 5     Sig: USE ONCE DAILY WITH INJECTION    dilTIAZem (TIAZAC) 180 MG extended release capsule [Pharmacy Med Name: DILTIAZEM HCL ER 180MG/24 CAPSULE ER 24HR] 90 capsule 0     Sig: TAKE ONE (1) CAPSULE BY MOUTH DAILY

## 2024-06-20 RX ORDER — INSULIN GLARGINE 100 [IU]/ML
INJECTION, SOLUTION SUBCUTANEOUS
Qty: 15 ML | Refills: 0 | Status: SHIPPED | OUTPATIENT
Start: 2024-06-20

## 2024-06-20 NOTE — TELEPHONE ENCOUNTER
Refill Request     CONFIRM preferrred pharmacy with the patient.    If Mail Order Rx - Pend for 90 day refill.      Last Seen: Last Seen Department: 3/20/2024  Last Seen by PCP: 3/20/2024    Last Written: 5/21/24    If no future appointment scheduled, route STAFF MESSAGE with patient name to the  Pool for scheduling.      Next Appointment:   Future Appointments   Date Time Provider Department Center   6/24/2024 10:00 AM [facility] [place] [facility]   6/25/2024  9:00 AM Sudeep Ruth MD [facility]   11/18/2024 10:00 AM MHC CT MAIN [facility] CT [facility] [place] [facility]   11/19/2024 10:30 AM Ben Medeiros MD [facility]       Message sent to  to schedule appt with patient?  N/A      Requested Prescriptions     Pending Prescriptions Disp Refills    LANTUS SOLOSTAR 100 UNIT/ML injection pen [Pharmacy Med Name: LANTUS SOLOSTAR 100/ML SOLN PEN-INJ] 15 mL 0     Sig: INJECT 25 UNITS INTO THE SKIN DAILY WITH SUPPER

## 2024-06-24 ENCOUNTER — OFFICE VISIT (OUTPATIENT)
Dept: FAMILY MEDICINE CLINIC | Age: 69
End: 2024-06-24
Payer: MEDICARE

## 2024-06-24 VITALS
DIASTOLIC BLOOD PRESSURE: 86 MMHG | BODY MASS INDEX: 31.95 KG/M2 | HEART RATE: 67 BPM | OXYGEN SATURATION: 90 % | WEIGHT: 204 LBS | SYSTOLIC BLOOD PRESSURE: 135 MMHG

## 2024-06-24 DIAGNOSIS — E11.69 TYPE 2 DIABETES MELLITUS WITH OTHER SPECIFIED COMPLICATION, WITH LONG-TERM CURRENT USE OF INSULIN (HCC): ICD-10-CM

## 2024-06-24 DIAGNOSIS — K75.81 LIVER CIRRHOSIS SECONDARY TO NASH (HCC): ICD-10-CM

## 2024-06-24 DIAGNOSIS — K21.9 GASTROESOPHAGEAL REFLUX DISEASE WITHOUT ESOPHAGITIS: ICD-10-CM

## 2024-06-24 DIAGNOSIS — E78.00 HYPERCHOLESTEROLEMIA: ICD-10-CM

## 2024-06-24 DIAGNOSIS — R42 POSTURAL DIZZINESS: ICD-10-CM

## 2024-06-24 DIAGNOSIS — I50.22 CHRONIC SYSTOLIC (CONGESTIVE) HEART FAILURE (HCC): ICD-10-CM

## 2024-06-24 DIAGNOSIS — Z79.4 TYPE 2 DIABETES MELLITUS WITH OTHER SPECIFIED COMPLICATION, WITH LONG-TERM CURRENT USE OF INSULIN (HCC): ICD-10-CM

## 2024-06-24 DIAGNOSIS — I10 PRIMARY HYPERTENSION: ICD-10-CM

## 2024-06-24 DIAGNOSIS — F17.200 CURRENT SMOKER: ICD-10-CM

## 2024-06-24 DIAGNOSIS — J96.11 CHRONIC RESPIRATORY FAILURE WITH HYPOXIA (HCC): ICD-10-CM

## 2024-06-24 DIAGNOSIS — K74.60 LIVER CIRRHOSIS SECONDARY TO NASH (HCC): ICD-10-CM

## 2024-06-24 DIAGNOSIS — R25.1 EPISODE OF SHAKING: ICD-10-CM

## 2024-06-24 DIAGNOSIS — Z12.5 SCREENING FOR PROSTATE CANCER: ICD-10-CM

## 2024-06-24 DIAGNOSIS — K59.04 CHRONIC IDIOPATHIC CONSTIPATION: ICD-10-CM

## 2024-06-24 DIAGNOSIS — J41.8 MIXED SIMPLE AND MUCOPURULENT CHRONIC BRONCHITIS (HCC): Primary | ICD-10-CM

## 2024-06-24 PROCEDURE — 1036F TOBACCO NON-USER: CPT | Performed by: FAMILY MEDICINE

## 2024-06-24 PROCEDURE — 3023F SPIROM DOC REV: CPT | Performed by: FAMILY MEDICINE

## 2024-06-24 PROCEDURE — 3044F HG A1C LEVEL LT 7.0%: CPT | Performed by: FAMILY MEDICINE

## 2024-06-24 PROCEDURE — 99214 OFFICE O/P EST MOD 30 MIN: CPT | Performed by: FAMILY MEDICINE

## 2024-06-24 PROCEDURE — 2022F DILAT RTA XM EVC RTNOPTHY: CPT | Performed by: FAMILY MEDICINE

## 2024-06-24 PROCEDURE — G8427 DOCREV CUR MEDS BY ELIG CLIN: HCPCS | Performed by: FAMILY MEDICINE

## 2024-06-24 PROCEDURE — G2211 COMPLEX E/M VISIT ADD ON: HCPCS | Performed by: FAMILY MEDICINE

## 2024-06-24 PROCEDURE — 3017F COLORECTAL CA SCREEN DOC REV: CPT | Performed by: FAMILY MEDICINE

## 2024-06-24 PROCEDURE — 1123F ACP DISCUSS/DSCN MKR DOCD: CPT | Performed by: FAMILY MEDICINE

## 2024-06-24 PROCEDURE — G8417 CALC BMI ABV UP PARAM F/U: HCPCS | Performed by: FAMILY MEDICINE

## 2024-06-24 PROCEDURE — 3079F DIAST BP 80-89 MM HG: CPT | Performed by: FAMILY MEDICINE

## 2024-06-24 PROCEDURE — 3075F SYST BP GE 130 - 139MM HG: CPT | Performed by: FAMILY MEDICINE

## 2024-06-24 NOTE — PROGRESS NOTES
dizziness        13. Chronic systolic (congestive) heart failure        Patient has had to increase his O2 to 4 L at times.  Mentions changing positions sometimes makes him dizzy asked him to steady himself before he moves.  Current blood pressure readings in the office or at home are acceptable and current antihypertensive medications as listed in the medication list require no change.  Reflux symptoms based upon patient's history is controlled and no changes in medications for reflux as listed in the medication profile is necessary.  Bowel movements at baseline.  Cirrhosis appears to be stable.  Tobacco abuse - Pt will not quit despite understanding of risks of continued tobacco use, including cancer, heart attacks, strokes or death.  Lipids will be monitored based upon levels requiring treatment and other cardiac risks.  Medications for hyperlipidemia and hypertriglyceridemia as listed on the medication list will be changed as necessary to reach control parameters.  Blood sugar readings by glycosylated hemoglobin or home fingerstick blood sugars are acceptable and no changes in diabetic medication as listed in the medication list are necessary.  He talks about seizures which clearly are not as he is fully conscious during these episodes where he shakes all over, he has no trigger noted.  I have asked him to check his sugar when this occurs.  There is no clinical evidence of coronary insufficiency or heart failure that requires any change in the treatment regimen and no changes in medications for cardiac conditions as listed in the medication list are necessary.  Follow-up 4 months    Patient should call the office immediately with new or ongoing signs or symptoms or worsening, or proceed to the emergency room.  No changes in past medical history, past surgical history, social history, or family history were noted during the patient encounter unless specifically listed above.  All updates of past medical history,

## 2024-06-25 LAB
ALBUMIN SERPL-MCNC: 4.1 G/DL (ref 3.4–5)
ALBUMIN/GLOB SERPL: 1.2 {RATIO} (ref 1.1–2.2)
ALP SERPL-CCNC: 181 U/L (ref 40–129)
ALT SERPL-CCNC: 12 U/L (ref 10–40)
ANION GAP SERPL CALCULATED.3IONS-SCNC: 12 MMOL/L (ref 3–16)
AST SERPL-CCNC: 17 U/L (ref 15–37)
BASOPHILS # BLD: 0.1 K/UL (ref 0–0.2)
BASOPHILS NFR BLD: 0.8 %
BILIRUB SERPL-MCNC: 0.3 MG/DL (ref 0–1)
BUN SERPL-MCNC: 8 MG/DL (ref 7–20)
CALCIUM SERPL-MCNC: 9.5 MG/DL (ref 8.3–10.6)
CHLORIDE SERPL-SCNC: 93 MMOL/L (ref 99–110)
CHOLEST SERPL-MCNC: 185 MG/DL (ref 0–199)
CO2 SERPL-SCNC: 32 MMOL/L (ref 21–32)
CREAT SERPL-MCNC: 0.8 MG/DL (ref 0.8–1.3)
DEPRECATED RDW RBC AUTO: 15 % (ref 12.4–15.4)
EOSINOPHIL # BLD: 0.2 K/UL (ref 0–0.6)
EOSINOPHIL NFR BLD: 2.6 %
EST. AVERAGE GLUCOSE BLD GHB EST-MCNC: 122.6 MG/DL
GFR SERPLBLD CREATININE-BSD FMLA CKD-EPI: >90 ML/MIN/{1.73_M2}
GLUCOSE SERPL-MCNC: 124 MG/DL (ref 70–99)
HBA1C MFR BLD: 5.9 %
HCT VFR BLD AUTO: 45 % (ref 40.5–52.5)
HDLC SERPL-MCNC: 42 MG/DL (ref 40–60)
HGB BLD-MCNC: 14.8 G/DL (ref 13.5–17.5)
LDLC SERPL CALC-MCNC: 106 MG/DL
LYMPHOCYTES # BLD: 1.8 K/UL (ref 1–5.1)
LYMPHOCYTES NFR BLD: 19.7 %
MAGNESIUM SERPL-MCNC: 2 MG/DL (ref 1.8–2.4)
MCH RBC QN AUTO: 30.6 PG (ref 26–34)
MCHC RBC AUTO-ENTMCNC: 33 G/DL (ref 31–36)
MCV RBC AUTO: 92.9 FL (ref 80–100)
MONOCYTES # BLD: 0.9 K/UL (ref 0–1.3)
MONOCYTES NFR BLD: 9.8 %
NEUTROPHILS # BLD: 6.1 K/UL (ref 1.7–7.7)
NEUTROPHILS NFR BLD: 67.1 %
PLATELET # BLD AUTO: 276 K/UL (ref 135–450)
PMV BLD AUTO: 7.6 FL (ref 5–10.5)
POTASSIUM SERPL-SCNC: 4 MMOL/L (ref 3.5–5.1)
PROT SERPL-MCNC: 7.4 G/DL (ref 6.4–8.2)
PSA SERPL DL<=0.01 NG/ML-MCNC: 3 NG/ML (ref 0–4)
RBC # BLD AUTO: 4.84 M/UL (ref 4.2–5.9)
SODIUM SERPL-SCNC: 137 MMOL/L (ref 136–145)
TRIGL SERPL-MCNC: 184 MG/DL (ref 0–150)
TSH SERPL DL<=0.005 MIU/L-ACNC: 2.38 UIU/ML (ref 0.27–4.2)
VLDLC SERPL CALC-MCNC: 37 MG/DL
WBC # BLD AUTO: 9.1 K/UL (ref 4–11)

## 2024-07-01 RX ORDER — FLUTICASONE FUROATE, UMECLIDINIUM BROMIDE AND VILANTEROL TRIFENATATE 100; 62.5; 25 UG/1; UG/1; UG/1
POWDER RESPIRATORY (INHALATION)
Qty: 1 EACH | Refills: 5 | Status: SHIPPED | OUTPATIENT
Start: 2024-07-01

## 2024-07-15 DIAGNOSIS — E55.9 VITAMIN D DEFICIENCY: ICD-10-CM

## 2024-07-15 NOTE — TELEPHONE ENCOUNTER
Refill Request     CONFIRM preferrred pharmacy with the patient.    If Mail Order Rx - Pend for 90 day refill.      Last Seen: Last Seen Department: 6/24/2024  Last Seen by PCP: 6/24/2024    Last Written: 6/12/24    If no future appointment scheduled, route STAFF MESSAGE with patient name to the  Pool for scheduling.      Next Appointment:   Future Appointments   Date Time Provider Department Center   10/29/2024 10:00 AM [facility] [place] [facility]   11/18/2024 10:00 [facility] [place] [facility]   11/19/2024 10:45 AM Todd Joyce MD [facility]       Message sent to  to schedule appt with patient?  NO      Requested Prescriptions     Pending Prescriptions Disp Refills    Cholecalciferol (VITAMIN D3) 125 MCG (5000 UT) CAPS [Pharmacy Med Name: VITAMIN D3 5000UNIT CAPSULE] 90 capsule 0     Sig: TAKE ONE (1) CAPSULE BY MOUTH DAILY

## 2024-07-25 DIAGNOSIS — I10 UNCONTROLLED HYPERTENSION: ICD-10-CM

## 2024-07-25 DIAGNOSIS — I10 BENIGN ESSENTIAL HTN: ICD-10-CM

## 2024-07-25 DIAGNOSIS — J41.8 MIXED SIMPLE AND MUCOPURULENT CHRONIC BRONCHITIS (HCC): ICD-10-CM

## 2024-07-25 RX ORDER — METOPROLOL SUCCINATE 50 MG/1
50 TABLET, EXTENDED RELEASE ORAL DAILY
Qty: 30 TABLET | Refills: 2 | Status: SHIPPED | OUTPATIENT
Start: 2024-07-25

## 2024-07-25 RX ORDER — FUROSEMIDE 20 MG/1
TABLET ORAL
Qty: 30 TABLET | Refills: 5 | Status: SHIPPED | OUTPATIENT
Start: 2024-07-25

## 2024-07-25 RX ORDER — ALBUTEROL SULFATE 90 UG/1
AEROSOL, METERED RESPIRATORY (INHALATION)
Qty: 8.5 G | Refills: 5 | Status: SHIPPED | OUTPATIENT
Start: 2024-07-25

## 2024-07-25 NOTE — TELEPHONE ENCOUNTER
Refill Request     CONFIRM preferrred pharmacy with the patient.    If Mail Order Rx - Pend for 90 day refill.      Last Seen: Last Seen Department: 6/24/2024  Last Seen by PCP: 6/24/2024    Last Written: 6/24/24    If no future appointment scheduled, route STAFF MESSAGE with patient name to the  Pool for scheduling.      Next Appointment:   Future Appointments   Date Time Provider Department Center   10/29/2024 10:00 AM [facility] [place] [facility]   11/18/2024 10:00 AM MHC CT MAIN [facility] CT [facility] [place] [facility]   11/19/2024 10:45 AM Todd Joyce MD [facility]       Message sent to  to schedule appt with patient?  N/A      Requested Prescriptions     Pending Prescriptions Disp Refills    albuterol sulfate HFA (PROVENTIL;VENTOLIN;PROAIR) 108 (90 Base) MCG/ACT inhaler [Pharmacy Med Name: ALBUTEROL SULFATE HFA HFA AEROSOL SOLN] 8.5 g 5     Sig: INHALE TWO (2) PUFFS EVERY SIX (6) HOURS AS NEEDED FOR WHEEZING    metoprolol succinate (TOPROL XL) 50 MG extended release tablet [Pharmacy Med Name: METOPROLOL SUCCINATE ER 50MG ER TABLET ER 24HR] 30 tablet 2     Sig: TAKE ONE TABLET BY MOUTH EVERY DAY    furosemide (LASIX) 20 MG tablet [Pharmacy Med Name: FUROSEMIDE 20MG TABLET] 30 tablet 5     Sig: TAKE ONE (1) TABLET BY MOUTH DAILY

## 2024-07-30 RX ORDER — ATORVASTATIN CALCIUM 40 MG/1
TABLET, FILM COATED ORAL
Qty: 30 TABLET | Refills: 2 | Status: SHIPPED | OUTPATIENT
Start: 2024-07-30

## 2024-08-08 RX ORDER — PANTOPRAZOLE SODIUM 40 MG/1
TABLET, DELAYED RELEASE ORAL
Qty: 30 TABLET | Refills: 2 | Status: SHIPPED | OUTPATIENT
Start: 2024-08-08

## 2024-08-08 RX ORDER — FERROUS SULFATE 325(65) MG
TABLET ORAL
Qty: 90 TABLET | Refills: 0 | Status: SHIPPED | OUTPATIENT
Start: 2024-08-08

## 2024-08-08 RX ORDER — CALCIUM CITRATE/VITAMIN D3 200MG-6.25
TABLET ORAL
Qty: 50 EACH | Refills: 0 | Status: SHIPPED | OUTPATIENT
Start: 2024-08-08

## 2024-08-08 NOTE — TELEPHONE ENCOUNTER
Refill Request     CONFIRM preferrred pharmacy with the patient.    If Mail Order Rx - Pend for 90 day refill.      Last Seen: Last Seen Department: 6/24/2024  Last Seen by PCP: 6/24/2024    Last Written: 5/9/24    If no future appointment scheduled, route STAFF MESSAGE with patient name to the  Pool for scheduling.      Next Appointment:   Future Appointments   Date Time Provider Department Center   10/29/2024 10:00 [facility]   11/18/2024 10:00 [facility] [place] [facility]   11/19/2024 10:45 AM Todd Joyce MD [facility]       Message sent to  to schedule appt with patient?  N/A      Requested Prescriptions     Pending Prescriptions Disp Refills    FEROSUL 325 (65 Fe) MG tablet [Pharmacy Med Name: FEROSUL 325MG TABLET] 90 tablet 0     Sig: TAKE ONE (1) TABLET BY MOUTH DAILY WITH BREAKFAST

## 2024-08-08 NOTE — TELEPHONE ENCOUNTER
Refill Request     CONFIRM preferrred pharmacy with the patient.    If Mail Order Rx - Pend for 90 day refill.      Last Seen: Last Seen Department: 6/24/2024  Last Seen by PCP: 6/24/2024    Last Written: 10/16/23    If no future appointment scheduled, route STAFF MESSAGE with patient name to the  Pool for scheduling.      Next Appointment:   Future Appointments   Date Time Provider Department Center   10/29/2024 10:00 AM [facility]   11/18/2024 10:00 AM MHC CT MAIN [facility] CT [facility] [place] [facility]   11/19/2024 10:45 AM Todd Joyce MD [facility]       Message sent to  to schedule appt with patient?  N/A      Requested Prescriptions     Pending Prescriptions Disp Refills    TRUE METRIX BLOOD GLUCOSE TEST strip [Pharmacy Med Name: TRUE METRIX SELF MONITORING BLOOD GLUCOSE STRIPS GLUCOSE STRIP] 50 each 0     Sig: USE TWICE DAILY    pantoprazole (PROTONIX) 40 MG tablet [Pharmacy Med Name: PANTOPRAZOLE SODIUM 40MG TABLET DR] 30 tablet 2     Sig: TAKE ONE TABLET BY MOUTH EVERY MORNING BEFORE BREAKFAST

## 2024-08-23 DIAGNOSIS — I10 PRIMARY HYPERTENSION: Primary | ICD-10-CM

## 2024-08-23 RX ORDER — DILTIAZEM HYDROCHLORIDE 180 MG/1
CAPSULE, EXTENDED RELEASE ORAL
Qty: 90 CAPSULE | Refills: 0 | Status: SHIPPED | OUTPATIENT
Start: 2024-08-23

## 2024-08-23 RX ORDER — FUROSEMIDE 20 MG/1
TABLET ORAL
Qty: 30 TABLET | Refills: 5 | Status: SHIPPED | OUTPATIENT
Start: 2024-08-23

## 2024-08-23 NOTE — TELEPHONE ENCOUNTER
Refill Request     CONFIRM preferrred pharmacy with the patient.    If Mail Order Rx - Pend for 90 day refill.      Last Seen: Last Seen Department: 6/24/2024  Last Seen by PCP: 6/24/2024    Last Written: 7/25/24    If no future appointment scheduled, route STAFF MESSAGE with patient name to the  Pool for scheduling.      Next Appointment:   Future Appointments   Date Time Provider Department Center   10/29/2024 10:00 AM [facility]   11/18/2024 10:00 AM [facility] [place] [facility]   11/19/2024 10:45 AM Todd Joyce MD [facility]       Message sent to  to schedule appt with patient?  NO      Requested Prescriptions     Pending Prescriptions Disp Refills    furosemide (LASIX) 20 MG tablet [Pharmacy Med Name: FUROSEMIDE 20MG TABLET] 30 tablet 5     Sig: TAKE ONE (1) TABLET BY MOUTH DAILY    dilTIAZem (TIAZAC) 180 MG extended release capsule [Pharmacy Med Name: DILTIAZEM HCL ER 180MG/24 CAPSULE ER 24HR] 90 capsule 0     Sig: TAKE ONE (1) CAPSULE BY MOUTH DAILY    7/25/

## 2024-08-26 NOTE — TELEPHONE ENCOUNTER
DISCHARGE INSTRUCTIONS        During this surgical procedure, you had treatment of your kidney stone done.  Here are some instructions to help you care for yourself once you are back home.    You may pull the stent on string on Thursday 8/29/24   Activity  Limit physical activity for the first week after surgery. This will give your body time to rest and heal.  Other home care  If you have been prescribed antibiotics, finish all of the antibiotics your healthcare provider prescribed to you, even if you feel better. Antibiotics help keep you from getting an infection.  Eat high-fiber foods to prevent constipation. Also use laxatives, stool softeners, or enemas as directed by your provider.  You may notice blood in the urine, burning with urination, cramping. This is common after your procedure. If you are unable to urinate completely, please call the office.  Return to your normal diet.  Drink plenty of fluids during the day (enough to keep your urine light colored). This will help keep urine flowing freely.  Shower as normal.      Follow-up  Make a follow-up appointment as directed by your healthcare provider.  Call 911  Call 911 right away if you have:  Shortness of breath  When to call your healthcare provider  Call your healthcare provider right away if you have:  Fever of 100.4°F (38°C) or higher, or as directed by your provider  Shaking chills  Hives or skin rash  Nausea, vomiting, or diarrhea  Catheter falls out or stops draining  Inability to urinate  Blood in the urine leading to clots   Foul-smelling discharge from your catheter or urethra (at tip of penis)    [employer] last reviewed this educational content on 9/1/2019 © 2000-2020 [employer]. [address] [place] [address], [place] [address]. All rights reserved. This information is not intended as a substitute for professional medical care. Always follow your healthcare professional's instructions.      For any questions or concerns regarding your  Radha Laura is requesting refill(s)   Last OV 6/25/18 (pertaining to medication)  LR ?  (per medication requested)  Next office visit scheduled or attempted Yes   If no, reason:  10826/18    Looks like pt is admitted into the hospital and this was taken off his med list ? surgery within the first 72 hours please always call your surgeons office.     Patient/Family given For your Well Being Teaching Sheet:  _x__ Care After Anesthesia/ Sedation  _x__ Pain Management Tips  _x__ About Surgical Site infection  ___ Smoking and second hand Smoke information  ___ Other:       Want to Say “Thank You” to a Nurse?  The SYLVIA Award® was created in memory of TINA Young by his family to say thank you to bedside nurses who provide an outstanding level of care.  Submit a nomination using any method below.     OR    [email or web address]

## 2024-09-10 RX ORDER — FOLIC ACID 1 MG/1
TABLET ORAL
Qty: 30 TABLET | Refills: 5 | Status: SHIPPED | OUTPATIENT
Start: 2024-09-10

## 2024-09-16 ENCOUNTER — TELEPHONE (OUTPATIENT)
Dept: FAMILY MEDICINE CLINIC | Age: 69
End: 2024-09-16

## 2024-09-16 RX ORDER — SULFAMETHOXAZOLE/TRIMETHOPRIM 800-160 MG
1 TABLET ORAL 2 TIMES DAILY
Qty: 20 TABLET | Refills: 0 | Status: SHIPPED | OUTPATIENT
Start: 2024-09-16 | End: 2024-09-26

## 2024-09-24 RX ORDER — INSULIN GLARGINE 100 [IU]/ML
INJECTION, SOLUTION SUBCUTANEOUS
Qty: 15 ML | Refills: 0 | OUTPATIENT
Start: 2024-09-24

## 2024-09-24 RX ORDER — INSULIN GLARGINE 100 [IU]/ML
INJECTION, SOLUTION SUBCUTANEOUS
Qty: 15 ML | Refills: 0 | Status: SHIPPED | OUTPATIENT
Start: 2024-09-24

## 2024-09-24 NOTE — PATIENT INSTRUCTIONS
[facility] 7 am - 7:30 pm  [address], [place] [facility] [address]  [phone]    [facility] 8 am - 8 pm  [address], [place]  [address]  [phone]           Patient should call the office immediately with new or ongoing signs or symptoms or worsening, or proceed to the emergency room.  If you are on medications which could impair your senses, you are at risk of weakness, falls, dizziness, or drowsiness.  You should be careful during activities which could place you at risk of harm, such as climbing, using stairs, operating machinery, or driving vehicles.  If you feel you cannot safely do these activities, you should request others to help you, or avoid the activities altogether. If you are drowsy for any other reason, you should use the same precautions as listed above.     Web Address for Advance Directive:    http://www.caringinfo.org/files/public/ad/[place].pdf      025/794

## 2024-10-01 ENCOUNTER — TELEPHONE (OUTPATIENT)
Dept: FAMILY MEDICINE CLINIC | Age: 69
End: 2024-10-01

## 2024-10-02 ENCOUNTER — HOSPITAL ENCOUNTER (OUTPATIENT)
Dept: GENERAL RADIOLOGY | Age: 69
Discharge: HOME | End: 2024-10-02
Payer: MEDICARE

## 2024-10-02 ENCOUNTER — HOSPITAL ENCOUNTER (OUTPATIENT)
Age: 69
Discharge: HOME | End: 2024-10-02
Payer: MEDICARE

## 2024-10-02 ENCOUNTER — TELEPHONE (OUTPATIENT)
Dept: FAMILY MEDICINE CLINIC | Age: 69
End: 2024-10-02

## 2024-10-02 ENCOUNTER — OFFICE VISIT (OUTPATIENT)
Dept: FAMILY MEDICINE CLINIC | Age: 69
End: 2024-10-02

## 2024-10-02 VITALS — DIASTOLIC BLOOD PRESSURE: 76 MMHG | HEART RATE: 68 BPM | SYSTOLIC BLOOD PRESSURE: 147 MMHG | OXYGEN SATURATION: 95 %

## 2024-10-02 DIAGNOSIS — R09.89 ABNORMAL LUNG SOUNDS: ICD-10-CM

## 2024-10-02 DIAGNOSIS — J47.9 BRONCHIECTASIS WITHOUT COMPLICATION (HCC): Primary | ICD-10-CM

## 2024-10-02 DIAGNOSIS — R07.9 CHEST PAIN, UNSPECIFIED TYPE: ICD-10-CM

## 2024-10-02 DIAGNOSIS — R35.0 URINARY FREQUENCY: Primary | ICD-10-CM

## 2024-10-02 DIAGNOSIS — R07.81 PAIN IN RIB: ICD-10-CM

## 2024-10-02 LAB
BILIRUBIN, POC: NORMAL
BLOOD URINE, POC: NORMAL
CLARITY, POC: CLEAR
COLOR, POC: YELLOW
GLUCOSE URINE, POC: NORMAL MG/DL
KETONES, POC: NORMAL MG/DL
LEUKOCYTE EST, POC: NORMAL
NITRITE, POC: NORMAL
PH, POC: 7
PROTEIN, POC: NORMAL MG/DL
SPECIFIC GRAVITY, POC: 1.02
UROBILINOGEN, POC: 1 MG/DL

## 2024-10-02 PROCEDURE — 71046 X-RAY EXAM CHEST 2 VIEWS: CPT

## 2024-10-02 RX ORDER — METHYLPREDNISOLONE 4 MG
TABLET, DOSE PACK ORAL
Qty: 1 KIT | Refills: 0 | Status: SHIPPED | OUTPATIENT
Start: 2024-10-02

## 2024-10-02 RX ORDER — SULFAMETHOXAZOLE/TRIMETHOPRIM 800-160 MG
1 TABLET ORAL 2 TIMES DAILY
Qty: 20 TABLET | Refills: 0 | Status: SHIPPED | OUTPATIENT
Start: 2024-10-02 | End: 2024-10-12

## 2024-10-02 NOTE — PROGRESS NOTES
were noted during the patient encounter unless specifically listed above.  All updates of past medical history, past surgical history, social history, or family history were reviewed personally by me during the office visit.  All problems listed in the assessment are stable unless noted otherwise.  Medication profile reviewed personally by me during the visit.  Medication side effects and possible impairments from medications were discussed as applicable.    Unless stated otherwise, we will continue current medications as listed in the medication review report contained in the patient's medical record.    This document was prepared by a combination of typing and transcription through a voice recognition software.                Scribe attestation: Neela SONG RN, am scribing for and in the presence of LOYD MARTINEZ MD. Electronically signed by Neela Ramires RN on 10/2/2024 at 11:40 AM      Provider attestation:     NOHEMI, Dr. GEOVANI Martinez, personally performed the services described in this documentation, as scribed by the above signed scribe in my presence, and it is both accurate and complete. I agree with the ROS and Past Histories independently gathered by the clinical support staff and the remaining scribed note accurately describes my personal service to the patient.      10/6/2024    2:21 PM

## 2024-10-02 NOTE — TELEPHONE ENCOUNTER
Pt states that he was on an antibiotic a week ago for a UTI. States that it felt like it went away but his kidneys still are hurting. Was wanting to see if he could get another round of it and he uses [facility].

## 2024-10-04 LAB — BACTERIA UR CULT: NORMAL

## 2024-10-09 RX ORDER — FERROUS SULFATE 325(65) MG
TABLET ORAL
Qty: 90 TABLET | Refills: 0 | Status: SHIPPED | OUTPATIENT
Start: 2024-10-09

## 2024-10-09 RX ORDER — PANTOPRAZOLE SODIUM 40 MG/1
TABLET, DELAYED RELEASE ORAL
Qty: 30 TABLET | Refills: 2 | Status: SHIPPED | OUTPATIENT
Start: 2024-10-09

## 2024-10-09 NOTE — TELEPHONE ENCOUNTER
Refill Request     CONFIRM preferrred pharmacy with the patient.    If Mail Order Rx - Pend for 90 day refill.      Last Seen: Last Seen Department: 10/2/2024  Last Seen by PCP: 10/2/2024    Last Written: 8/8/24    If no future appointment scheduled, route STAFF MESSAGE with patient name to the  Pool for scheduling.      Next Appointment:   Future Appointments   Date Time Provider Department Center   10/29/2024 10:00 AM [facility]   11/18/2024 10:00 AM [facility] [place] [facility]   11/19/2024 10:45 AM Todd Joyce MD [facility]       Message sent to  to schedule appt with patient?  N/A      Requested Prescriptions     Pending Prescriptions Disp Refills    pantoprazole (PROTONIX) 40 MG tablet [Pharmacy Med Name: PANTOPRAZOLE SODIUM 40MG TABLET DR] 30 tablet 2     Sig: TAKE ONE TABLET BY MOUTH EVERY MORNING BEFORE BREAKFAST

## 2024-10-21 ENCOUNTER — APPOINTMENT (OUTPATIENT)
Dept: CT IMAGING | Age: 69
DRG: 603 | End: 2024-10-21
Payer: MEDICARE

## 2024-10-21 ENCOUNTER — HOSPITAL ENCOUNTER (INPATIENT)
Age: 69
LOS: 4 days | Discharge: NURSING, REHAB OR LONG-TERM CARE | DRG: 603 | End: 2024-10-25
Attending: STUDENT IN AN ORGANIZED HEALTH CARE EDUCATION/TRAINING PROGRAM | Admitting: FAMILY MEDICINE
Payer: MEDICARE

## 2024-10-21 DIAGNOSIS — L02.215 PERINEAL ABSCESS: Primary | ICD-10-CM

## 2024-10-21 DIAGNOSIS — G89.4 CHRONIC PAIN SYNDROME: ICD-10-CM

## 2024-10-21 PROBLEM — N49.2 SCROTAL ABSCESS: Status: ACTIVE | Noted: 2024-10-21

## 2024-10-21 LAB
ALBUMIN SERPL-MCNC: 3.7 G/DL (ref 3.4–5)
ALBUMIN/GLOB SERPL: 1 {RATIO} (ref 1.1–2.2)
ALP SERPL-CCNC: 202 U/L (ref 40–129)
ALT SERPL-CCNC: 24 U/L (ref 10–40)
ANION GAP SERPL CALCULATED.3IONS-SCNC: 9 MMOL/L (ref 3–16)
AST SERPL-CCNC: 23 U/L (ref 15–37)
BASOPHILS # BLD: 0.1 K/UL (ref 0–0.2)
BASOPHILS NFR BLD: 0.6 %
BILIRUB SERPL-MCNC: 0.3 MG/DL (ref 0–1)
BUN SERPL-MCNC: 10 MG/DL (ref 7–20)
CALCIUM SERPL-MCNC: 9.8 MG/DL (ref 8.3–10.6)
CHLORIDE SERPL-SCNC: 97 MMOL/L (ref 99–110)
CO2 SERPL-SCNC: 33 MMOL/L (ref 21–32)
CREAT SERPL-MCNC: 0.7 MG/DL (ref 0.8–1.3)
DEPRECATED RDW RBC AUTO: 15.2 % (ref 12.4–15.4)
EOSINOPHIL # BLD: 0.2 K/UL (ref 0–0.6)
EOSINOPHIL NFR BLD: 2.1 %
GFR SERPLBLD CREATININE-BSD FMLA CKD-EPI: >90 ML/MIN/{1.73_M2}
GLUCOSE BLD-MCNC: 127 MG/DL (ref 70–99)
GLUCOSE SERPL-MCNC: 154 MG/DL (ref 70–99)
HCT VFR BLD AUTO: 39.6 % (ref 40.5–52.5)
HGB BLD-MCNC: 13.1 G/DL (ref 13.5–17.5)
LACTATE BLDV-SCNC: 1.5 MMOL/L (ref 0.4–1.9)
LYMPHOCYTES # BLD: 1.2 K/UL (ref 1–5.1)
LYMPHOCYTES NFR BLD: 11.4 %
MAGNESIUM SERPL-MCNC: 1.4 MG/DL (ref 1.8–2.4)
MCH RBC QN AUTO: 30.9 PG (ref 26–34)
MCHC RBC AUTO-ENTMCNC: 33.1 G/DL (ref 31–36)
MCV RBC AUTO: 93.2 FL (ref 80–100)
MONOCYTES # BLD: 0.8 K/UL (ref 0–1.3)
MONOCYTES NFR BLD: 7.7 %
NEUTROPHILS # BLD: 8 K/UL (ref 1.7–7.7)
NEUTROPHILS NFR BLD: 78.2 %
PERFORMED ON: ABNORMAL
PLATELET # BLD AUTO: 207 K/UL (ref 135–450)
PMV BLD AUTO: 7 FL (ref 5–10.5)
POTASSIUM SERPL-SCNC: 3.1 MMOL/L (ref 3.5–5.1)
PROT SERPL-MCNC: 7.3 G/DL (ref 6.4–8.2)
RBC # BLD AUTO: 4.24 M/UL (ref 4.2–5.9)
SODIUM SERPL-SCNC: 139 MMOL/L (ref 136–145)
WBC # BLD AUTO: 10.2 K/UL (ref 4–11)

## 2024-10-21 PROCEDURE — 94761 N-INVAS EAR/PLS OXIMETRY MLT: CPT

## 2024-10-21 PROCEDURE — 6360000002 HC RX W HCPCS: Performed by: FAMILY MEDICINE

## 2024-10-21 PROCEDURE — 2580000003 HC RX 258: Performed by: NURSE PRACTITIONER

## 2024-10-21 PROCEDURE — 83735 ASSAY OF MAGNESIUM: CPT

## 2024-10-21 PROCEDURE — 87040 BLOOD CULTURE FOR BACTERIA: CPT

## 2024-10-21 PROCEDURE — 96365 THER/PROPH/DIAG IV INF INIT: CPT

## 2024-10-21 PROCEDURE — 93005 ELECTROCARDIOGRAM TRACING: CPT | Performed by: STUDENT IN AN ORGANIZED HEALTH CARE EDUCATION/TRAINING PROGRAM

## 2024-10-21 PROCEDURE — 2700000000 HC OXYGEN THERAPY PER DAY

## 2024-10-21 PROCEDURE — 6370000000 HC RX 637 (ALT 250 FOR IP): Performed by: NURSE PRACTITIONER

## 2024-10-21 PROCEDURE — 74177 CT ABD & PELVIS W/CONTRAST: CPT

## 2024-10-21 PROCEDURE — 6360000002 HC RX W HCPCS: Performed by: STUDENT IN AN ORGANIZED HEALTH CARE EDUCATION/TRAINING PROGRAM

## 2024-10-21 PROCEDURE — 6360000004 HC RX CONTRAST MEDICATION: Performed by: STUDENT IN AN ORGANIZED HEALTH CARE EDUCATION/TRAINING PROGRAM

## 2024-10-21 PROCEDURE — 6370000000 HC RX 637 (ALT 250 FOR IP): Performed by: FAMILY MEDICINE

## 2024-10-21 PROCEDURE — 36415 COLL VENOUS BLD VENIPUNCTURE: CPT

## 2024-10-21 PROCEDURE — 1200000000 HC SEMI PRIVATE

## 2024-10-21 PROCEDURE — 6360000002 HC RX W HCPCS: Performed by: NURSE PRACTITIONER

## 2024-10-21 PROCEDURE — 80053 COMPREHEN METABOLIC PANEL: CPT

## 2024-10-21 PROCEDURE — 99285 EMERGENCY DEPT VISIT HI MDM: CPT

## 2024-10-21 PROCEDURE — 96367 TX/PROPH/DG ADDL SEQ IV INF: CPT

## 2024-10-21 PROCEDURE — 2580000003 HC RX 258: Performed by: STUDENT IN AN ORGANIZED HEALTH CARE EDUCATION/TRAINING PROGRAM

## 2024-10-21 PROCEDURE — 6370000000 HC RX 637 (ALT 250 FOR IP): Performed by: INTERNAL MEDICINE

## 2024-10-21 PROCEDURE — 85025 COMPLETE CBC W/AUTO DIFF WBC: CPT

## 2024-10-21 PROCEDURE — 83605 ASSAY OF LACTIC ACID: CPT

## 2024-10-21 PROCEDURE — 6370000000 HC RX 637 (ALT 250 FOR IP): Performed by: STUDENT IN AN ORGANIZED HEALTH CARE EDUCATION/TRAINING PROGRAM

## 2024-10-21 RX ORDER — DULOXETIN HYDROCHLORIDE 60 MG/1
60 CAPSULE, DELAYED RELEASE ORAL DAILY
Status: DISCONTINUED | OUTPATIENT
Start: 2024-10-22 | End: 2024-10-25 | Stop reason: HOSPADM

## 2024-10-21 RX ORDER — SODIUM CHLORIDE 9 MG/ML
INJECTION, SOLUTION INTRAVENOUS PRN
Status: DISCONTINUED | OUTPATIENT
Start: 2024-10-21 | End: 2024-10-25 | Stop reason: HOSPADM

## 2024-10-21 RX ORDER — MAGNESIUM SULFATE IN WATER 40 MG/ML
2000 INJECTION, SOLUTION INTRAVENOUS PRN
Status: DISCONTINUED | OUTPATIENT
Start: 2024-10-21 | End: 2024-10-25 | Stop reason: HOSPADM

## 2024-10-21 RX ORDER — IPRATROPIUM BROMIDE AND ALBUTEROL SULFATE 2.5; .5 MG/3ML; MG/3ML
1 SOLUTION RESPIRATORY (INHALATION) EVERY 4 HOURS PRN
Status: DISCONTINUED | OUTPATIENT
Start: 2024-10-21 | End: 2024-10-25 | Stop reason: HOSPADM

## 2024-10-21 RX ORDER — INSULIN GLARGINE 100 [IU]/ML
25 INJECTION, SOLUTION SUBCUTANEOUS
Status: DISCONTINUED | OUTPATIENT
Start: 2024-10-21 | End: 2024-10-21

## 2024-10-21 RX ORDER — DEXTROSE MONOHYDRATE 100 MG/ML
INJECTION, SOLUTION INTRAVENOUS CONTINUOUS PRN
Status: DISCONTINUED | OUTPATIENT
Start: 2024-10-21 | End: 2024-10-25 | Stop reason: HOSPADM

## 2024-10-21 RX ORDER — TAMSULOSIN HYDROCHLORIDE 0.4 MG/1
0.4 CAPSULE ORAL DAILY
Status: DISCONTINUED | OUTPATIENT
Start: 2024-10-22 | End: 2024-10-25 | Stop reason: HOSPADM

## 2024-10-21 RX ORDER — SODIUM CHLORIDE 0.9 % (FLUSH) 0.9 %
5-40 SYRINGE (ML) INJECTION PRN
Status: DISCONTINUED | OUTPATIENT
Start: 2024-10-21 | End: 2024-10-25 | Stop reason: HOSPADM

## 2024-10-21 RX ORDER — CALCIUM CARBONATE 500 MG/1
500 TABLET, CHEWABLE ORAL 3 TIMES DAILY PRN
Status: DISCONTINUED | OUTPATIENT
Start: 2024-10-21 | End: 2024-10-25 | Stop reason: HOSPADM

## 2024-10-21 RX ORDER — POLYETHYLENE GLYCOL 3350 17 G/17G
17 POWDER, FOR SOLUTION ORAL DAILY PRN
Status: DISCONTINUED | OUTPATIENT
Start: 2024-10-21 | End: 2024-10-25 | Stop reason: HOSPADM

## 2024-10-21 RX ORDER — PANTOPRAZOLE SODIUM 40 MG/1
40 TABLET, DELAYED RELEASE ORAL
Status: DISCONTINUED | OUTPATIENT
Start: 2024-10-22 | End: 2024-10-25 | Stop reason: HOSPADM

## 2024-10-21 RX ORDER — MAGNESIUM SULFATE IN WATER 40 MG/ML
2000 INJECTION, SOLUTION INTRAVENOUS ONCE
Status: COMPLETED | OUTPATIENT
Start: 2024-10-21 | End: 2024-10-21

## 2024-10-21 RX ORDER — SODIUM CHLORIDE AND POTASSIUM CHLORIDE 150; 900 MG/100ML; MG/100ML
INJECTION, SOLUTION INTRAVENOUS CONTINUOUS
Status: DISPENSED | OUTPATIENT
Start: 2024-10-22 | End: 2024-10-22

## 2024-10-21 RX ORDER — SODIUM CHLORIDE 0.9 % (FLUSH) 0.9 %
5-40 SYRINGE (ML) INJECTION EVERY 12 HOURS SCHEDULED
Status: DISCONTINUED | OUTPATIENT
Start: 2024-10-21 | End: 2024-10-25 | Stop reason: HOSPADM

## 2024-10-21 RX ORDER — ALBUTEROL SULFATE 90 UG/1
2 INHALANT RESPIRATORY (INHALATION) EVERY 6 HOURS PRN
Status: DISCONTINUED | OUTPATIENT
Start: 2024-10-21 | End: 2024-10-25 | Stop reason: HOSPADM

## 2024-10-21 RX ORDER — ACETAMINOPHEN 500 MG
1000 TABLET ORAL ONCE
Status: DISCONTINUED | OUTPATIENT
Start: 2024-10-21 | End: 2024-10-21

## 2024-10-21 RX ORDER — VANCOMYCIN HCL IN 5 % DEXTROSE 1.25 G/25
1250 PLASTIC BAG, INJECTION (ML) INTRAVENOUS EVERY 12 HOURS
Status: DISCONTINUED | OUTPATIENT
Start: 2024-10-22 | End: 2024-10-23 | Stop reason: CLARIF

## 2024-10-21 RX ORDER — POTASSIUM CHLORIDE 1500 MG/1
40 TABLET, EXTENDED RELEASE ORAL PRN
Status: DISPENSED | OUTPATIENT
Start: 2024-10-21 | End: 2024-10-24

## 2024-10-21 RX ORDER — BUDESONIDE AND FORMOTEROL FUMARATE DIHYDRATE 80; 4.5 UG/1; UG/1
2 AEROSOL RESPIRATORY (INHALATION)
Status: DISCONTINUED | OUTPATIENT
Start: 2024-10-21 | End: 2024-10-22

## 2024-10-21 RX ORDER — OXYCODONE HYDROCHLORIDE 5 MG/1
10 TABLET ORAL 4 TIMES DAILY
Status: DISCONTINUED | OUTPATIENT
Start: 2024-10-21 | End: 2024-10-25 | Stop reason: HOSPADM

## 2024-10-21 RX ORDER — FUROSEMIDE 20 MG/1
20 TABLET ORAL DAILY
Status: DISCONTINUED | OUTPATIENT
Start: 2024-10-22 | End: 2024-10-25 | Stop reason: HOSPADM

## 2024-10-21 RX ORDER — BUDESONIDE AND FORMOTEROL FUMARATE DIHYDRATE 80; 4.5 UG/1; UG/1
2 AEROSOL RESPIRATORY (INHALATION)
Status: DISCONTINUED | OUTPATIENT
Start: 2024-10-21 | End: 2024-10-21

## 2024-10-21 RX ORDER — ONDANSETRON 2 MG/ML
4 INJECTION INTRAMUSCULAR; INTRAVENOUS EVERY 6 HOURS PRN
Status: DISCONTINUED | OUTPATIENT
Start: 2024-10-21 | End: 2024-10-25 | Stop reason: HOSPADM

## 2024-10-21 RX ORDER — ONDANSETRON 2 MG/ML
4 INJECTION INTRAMUSCULAR; INTRAVENOUS EVERY 6 HOURS PRN
Status: DISCONTINUED | OUTPATIENT
Start: 2024-10-21 | End: 2024-10-21 | Stop reason: SDUPTHER

## 2024-10-21 RX ORDER — OXYCODONE HYDROCHLORIDE 5 MG/1
10 TABLET ORAL EVERY 4 HOURS PRN
Status: DISCONTINUED | OUTPATIENT
Start: 2024-10-21 | End: 2024-10-21

## 2024-10-21 RX ORDER — ACETAMINOPHEN 650 MG/1
650 SUPPOSITORY RECTAL EVERY 6 HOURS PRN
Status: DISCONTINUED | OUTPATIENT
Start: 2024-10-21 | End: 2024-10-25 | Stop reason: HOSPADM

## 2024-10-21 RX ORDER — GLUCAGON 1 MG/ML
1 KIT INJECTION PRN
Status: DISCONTINUED | OUTPATIENT
Start: 2024-10-21 | End: 2024-10-25 | Stop reason: HOSPADM

## 2024-10-21 RX ORDER — ACETAMINOPHEN 325 MG/1
650 TABLET ORAL EVERY 6 HOURS PRN
Status: DISCONTINUED | OUTPATIENT
Start: 2024-10-21 | End: 2024-10-25 | Stop reason: HOSPADM

## 2024-10-21 RX ORDER — INSULIN GLARGINE 100 [IU]/ML
15 INJECTION, SOLUTION SUBCUTANEOUS
Status: DISCONTINUED | OUTPATIENT
Start: 2024-10-22 | End: 2024-10-25 | Stop reason: HOSPADM

## 2024-10-21 RX ORDER — ONDANSETRON 4 MG/1
4 TABLET, ORALLY DISINTEGRATING ORAL EVERY 8 HOURS PRN
Status: DISCONTINUED | OUTPATIENT
Start: 2024-10-21 | End: 2024-10-25 | Stop reason: HOSPADM

## 2024-10-21 RX ORDER — INSULIN GLARGINE 100 [IU]/ML
15 INJECTION, SOLUTION SUBCUTANEOUS
Status: DISCONTINUED | OUTPATIENT
Start: 2024-10-22 | End: 2024-10-21

## 2024-10-21 RX ORDER — DILTIAZEM HYDROCHLORIDE 180 MG/1
180 CAPSULE, COATED, EXTENDED RELEASE ORAL DAILY
Status: DISCONTINUED | OUTPATIENT
Start: 2024-10-22 | End: 2024-10-25 | Stop reason: HOSPADM

## 2024-10-21 RX ORDER — FOLIC ACID 1 MG/1
1000 TABLET ORAL DAILY
Status: DISCONTINUED | OUTPATIENT
Start: 2024-10-22 | End: 2024-10-25 | Stop reason: HOSPADM

## 2024-10-21 RX ORDER — ATORVASTATIN CALCIUM 40 MG/1
40 TABLET, FILM COATED ORAL DAILY
Status: DISCONTINUED | OUTPATIENT
Start: 2024-10-22 | End: 2024-10-25 | Stop reason: HOSPADM

## 2024-10-21 RX ORDER — IPRATROPIUM BROMIDE AND ALBUTEROL SULFATE 2.5; .5 MG/3ML; MG/3ML
1 SOLUTION RESPIRATORY (INHALATION) EVERY 4 HOURS PRN
Status: DISCONTINUED | OUTPATIENT
Start: 2024-10-21 | End: 2024-10-21

## 2024-10-21 RX ORDER — POTASSIUM CHLORIDE 750 MG/1
40 TABLET, EXTENDED RELEASE ORAL ONCE
Status: COMPLETED | OUTPATIENT
Start: 2024-10-21 | End: 2024-10-21

## 2024-10-21 RX ORDER — METOPROLOL SUCCINATE 50 MG/1
50 TABLET, EXTENDED RELEASE ORAL DAILY
Status: DISCONTINUED | OUTPATIENT
Start: 2024-10-22 | End: 2024-10-25 | Stop reason: HOSPADM

## 2024-10-21 RX ORDER — EZETIMIBE 10 MG/1
10 TABLET ORAL NIGHTLY
Status: DISCONTINUED | OUTPATIENT
Start: 2024-10-21 | End: 2024-10-25 | Stop reason: HOSPADM

## 2024-10-21 RX ORDER — IPRATROPIUM BROMIDE AND ALBUTEROL SULFATE 2.5; .5 MG/3ML; MG/3ML
1 SOLUTION RESPIRATORY (INHALATION) 3 TIMES DAILY
Status: DISCONTINUED | OUTPATIENT
Start: 2024-10-22 | End: 2024-10-23

## 2024-10-21 RX ORDER — GABAPENTIN 400 MG/1
800 CAPSULE ORAL 4 TIMES DAILY
Status: DISCONTINUED | OUTPATIENT
Start: 2024-10-21 | End: 2024-10-25 | Stop reason: HOSPADM

## 2024-10-21 RX ORDER — IOPAMIDOL 755 MG/ML
75 INJECTION, SOLUTION INTRAVASCULAR
Status: COMPLETED | OUTPATIENT
Start: 2024-10-21 | End: 2024-10-21

## 2024-10-21 RX ORDER — ENOXAPARIN SODIUM 100 MG/ML
40 INJECTION SUBCUTANEOUS DAILY
Status: DISCONTINUED | OUTPATIENT
Start: 2024-10-22 | End: 2024-10-25 | Stop reason: HOSPADM

## 2024-10-21 RX ORDER — POTASSIUM CHLORIDE 7.45 MG/ML
10 INJECTION INTRAVENOUS PRN
Status: ACTIVE | OUTPATIENT
Start: 2024-10-21 | End: 2024-10-24

## 2024-10-21 RX ORDER — ALLOPURINOL 300 MG/1
300 TABLET ORAL DAILY
Status: DISCONTINUED | OUTPATIENT
Start: 2024-10-22 | End: 2024-10-25 | Stop reason: HOSPADM

## 2024-10-21 RX ORDER — FERROUS SULFATE 325(65) MG
325 TABLET ORAL
Status: DISCONTINUED | OUTPATIENT
Start: 2024-10-22 | End: 2024-10-25 | Stop reason: HOSPADM

## 2024-10-21 RX ADMIN — MAGNESIUM SULFATE HEPTAHYDRATE 2000 MG: 40 INJECTION, SOLUTION INTRAVENOUS at 15:50

## 2024-10-21 RX ADMIN — VANCOMYCIN HYDROCHLORIDE 2500 MG: 1 INJECTION, POWDER, LYOPHILIZED, FOR SOLUTION INTRAVENOUS at 15:05

## 2024-10-21 RX ADMIN — CEFEPIME 2000 MG: 2 INJECTION, POWDER, FOR SOLUTION INTRAVENOUS at 23:48

## 2024-10-21 RX ADMIN — POTASSIUM CHLORIDE 40 MEQ: 750 TABLET, EXTENDED RELEASE ORAL at 15:40

## 2024-10-21 RX ADMIN — INSULIN GLARGINE 15 UNITS: 100 INJECTION, SOLUTION SUBCUTANEOUS at 23:50

## 2024-10-21 RX ADMIN — GABAPENTIN 800 MG: 400 CAPSULE ORAL at 23:50

## 2024-10-21 RX ADMIN — OXYCODONE HYDROCHLORIDE 10 MG: 5 TABLET ORAL at 14:20

## 2024-10-21 RX ADMIN — PIPERACILLIN AND TAZOBACTAM 4500 MG: 4; .5 INJECTION, POWDER, LYOPHILIZED, FOR SOLUTION INTRAVENOUS at 14:10

## 2024-10-21 RX ADMIN — CALCIUM CARBONATE 500 MG: 500 TABLET, CHEWABLE ORAL at 23:50

## 2024-10-21 RX ADMIN — POTASSIUM CHLORIDE AND SODIUM CHLORIDE: 900; 150 INJECTION, SOLUTION INTRAVENOUS at 23:47

## 2024-10-21 RX ADMIN — IOPAMIDOL 75 ML: 755 INJECTION, SOLUTION INTRAVENOUS at 14:43

## 2024-10-21 RX ADMIN — ONDANSETRON 4 MG: 2 INJECTION INTRAMUSCULAR; INTRAVENOUS at 20:03

## 2024-10-21 ASSESSMENT — PAIN DESCRIPTION - DESCRIPTORS
DESCRIPTORS: BURNING;DISCOMFORT
DESCRIPTORS: BURNING;SHARP;SHOOTING
DESCRIPTORS: BURNING;DISCOMFORT

## 2024-10-21 ASSESSMENT — PAIN DESCRIPTION - LOCATION
LOCATION: GROIN

## 2024-10-21 ASSESSMENT — PAIN SCALES - GENERAL
PAINLEVEL_OUTOF10: 0
PAINLEVEL_OUTOF10: 8
PAINLEVEL_OUTOF10: 8
PAINLEVEL_OUTOF10: 6

## 2024-10-21 ASSESSMENT — PAIN DESCRIPTION - PAIN TYPE: TYPE: ACUTE PAIN

## 2024-10-21 ASSESSMENT — PAIN - FUNCTIONAL ASSESSMENT: PAIN_FUNCTIONAL_ASSESSMENT: 0-10

## 2024-10-21 ASSESSMENT — PAIN DESCRIPTION - ONSET: ONSET: GRADUAL

## 2024-10-21 ASSESSMENT — PAIN DESCRIPTION - FREQUENCY: FREQUENCY: CONTINUOUS

## 2024-10-21 NOTE — ED PROVIDER NOTES
[facility] EMERGENCY DEPARTMENT  EMERGENCY DEPARTMENT ENCOUNTER      Pt Name: Sandor Early  MRN: [id number]  Birthdate 1955  Date of evaluation: 10/21/2024  Provider: JENNIFFER LAWRENCE MD     CHIEF COMPLAINT       Chief Complaint   Patient presents with    Groin Pain     Per pt onset x 5 days with groin swelling and pain. Per pt increased swelling with pain while urinating after trying otc meds and creams.          HISTORY OF PRESENT ILLNESS   (Location/Symptom, Timing/Onset, Context/Setting, Quality, Duration, Modifying Factors, Severity) Note limiting factors.   I wore appropriate PPE for the entirety of this encounter.      HPI    Sandor Early is a 69 y.o. male who presents to the emergency department for 5 days of pain and swelling in his groin.  States it started after wearing new pants that were tighter than usual for him.  Denies fever/chills, nausea/vomiting, abdominal pain but states he is concerned about a possible abscess.    Nursing Notes were reviewed.  Limitations to history:  Outside historians:    REVIEW OF SYSTEMS     Review of Systems as documented in HPI above.     PAST MEDICAL HISTORY     Past Medical History:   Diagnosis Date    Bladder outlet obstruction 03/05/2017    Carpal tunnel syndrome of left wrist 04/02/2013    Cellulitis of right lower extremity 03/27/2023    Cellulitis of right upper extremity 03/01/2023    w/ Group A Strep bacteremia    COPD exacerbation (HCC) 03/02/2023    Cough syncope 01/10/2023    Diabetic ketoacidosis without coma associated with type 2 diabetes mellitus (HCC) 02/18/2018    GI bleed 05/08/2022    Gout 02/01/2013    Hilar adenopathy     Iron deficiency anemia     Malignant neoplasm of urinary bladder (HCC) 02/09/2016    Multiple duodenal ulcers     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

## 2024-10-22 ENCOUNTER — ANESTHESIA (OUTPATIENT)
Dept: OPERATING ROOM | Age: 69
DRG: 603 | End: 2024-10-22
Payer: MEDICARE

## 2024-10-22 ENCOUNTER — ANESTHESIA EVENT (OUTPATIENT)
Dept: OPERATING ROOM | Age: 69
DRG: 603 | End: 2024-10-22
Payer: MEDICARE

## 2024-10-22 ENCOUNTER — PREP FOR PROCEDURE (OUTPATIENT)
Dept: SURGERY | Age: 69
End: 2024-10-22

## 2024-10-22 DIAGNOSIS — L02.215 PERINEAL ABSCESS: ICD-10-CM

## 2024-10-22 PROBLEM — I50.32 CHRONIC DIASTOLIC CHF (CONGESTIVE HEART FAILURE) (HCC): Status: ACTIVE | Noted: 2024-10-22

## 2024-10-22 LAB
ALBUMIN SERPL-MCNC: 3.3 G/DL (ref 3.4–5)
ALBUMIN/GLOB SERPL: 1 {RATIO} (ref 1.1–2.2)
ALP SERPL-CCNC: 183 U/L (ref 40–129)
ALT SERPL-CCNC: 23 U/L (ref 10–40)
ANION GAP SERPL CALCULATED.3IONS-SCNC: 9 MMOL/L (ref 3–16)
AST SERPL-CCNC: 20 U/L (ref 15–37)
BASOPHILS # BLD: 0 K/UL (ref 0–0.2)
BASOPHILS NFR BLD: 0.4 %
BILIRUB SERPL-MCNC: 0.4 MG/DL (ref 0–1)
BILIRUB UR QL STRIP.AUTO: NEGATIVE
BUN SERPL-MCNC: 7 MG/DL (ref 7–20)
CALCIUM SERPL-MCNC: 9.2 MG/DL (ref 8.3–10.6)
CHLORIDE SERPL-SCNC: 100 MMOL/L (ref 99–110)
CLARITY UR: CLEAR
CO2 SERPL-SCNC: 29 MMOL/L (ref 21–32)
COLOR UR: YELLOW
CREAT SERPL-MCNC: 0.6 MG/DL (ref 0.8–1.3)
DEPRECATED RDW RBC AUTO: 15.4 % (ref 12.4–15.4)
EKG ATRIAL RATE: 104 BPM
EKG DIAGNOSIS: NORMAL
EKG P AXIS: 53 DEGREES
EKG P-R INTERVAL: 208 MS
EKG Q-T INTERVAL: 348 MS
EKG QRS DURATION: 88 MS
EKG QTC CALCULATION (BAZETT): 457 MS
EKG R AXIS: 144 DEGREES
EKG T AXIS: 58 DEGREES
EKG VENTRICULAR RATE: 104 BPM
EOSINOPHIL # BLD: 0.2 K/UL (ref 0–0.6)
EOSINOPHIL NFR BLD: 3.1 %
GFR SERPLBLD CREATININE-BSD FMLA CKD-EPI: >90 ML/MIN/{1.73_M2}
GLUCOSE BLD-MCNC: 114 MG/DL (ref 70–99)
GLUCOSE BLD-MCNC: 116 MG/DL (ref 70–99)
GLUCOSE BLD-MCNC: 145 MG/DL (ref 70–99)
GLUCOSE BLD-MCNC: 148 MG/DL (ref 70–99)
GLUCOSE BLD-MCNC: 91 MG/DL (ref 70–99)
GLUCOSE SERPL-MCNC: 86 MG/DL (ref 70–99)
GLUCOSE UR STRIP.AUTO-MCNC: NEGATIVE MG/DL
HCT VFR BLD AUTO: 37.8 % (ref 40.5–52.5)
HGB BLD-MCNC: 12.9 G/DL (ref 13.5–17.5)
HGB UR QL STRIP.AUTO: NEGATIVE
KETONES UR STRIP.AUTO-MCNC: NEGATIVE MG/DL
LEUKOCYTE ESTERASE UR QL STRIP.AUTO: NEGATIVE
LYMPHOCYTES # BLD: 1.4 K/UL (ref 1–5.1)
LYMPHOCYTES NFR BLD: 17.5 %
MAGNESIUM SERPL-MCNC: 1.7 MG/DL (ref 1.8–2.4)
MCH RBC QN AUTO: 31.7 PG (ref 26–34)
MCHC RBC AUTO-ENTMCNC: 34.2 G/DL (ref 31–36)
MCV RBC AUTO: 92.7 FL (ref 80–100)
MONOCYTES # BLD: 1 K/UL (ref 0–1.3)
MONOCYTES NFR BLD: 12.4 %
NEUTROPHILS # BLD: 5.3 K/UL (ref 1.7–7.7)
NEUTROPHILS NFR BLD: 66.6 %
NITRITE UR QL STRIP.AUTO: NEGATIVE
PERFORMED ON: ABNORMAL
PERFORMED ON: NORMAL
PH UR STRIP.AUTO: 6.5 [PH] (ref 5–8)
PLATELET # BLD AUTO: 196 K/UL (ref 135–450)
PMV BLD AUTO: 7.2 FL (ref 5–10.5)
POTASSIUM SERPL-SCNC: 3.6 MMOL/L (ref 3.5–5.1)
PROT SERPL-MCNC: 6.7 G/DL (ref 6.4–8.2)
PROT UR STRIP.AUTO-MCNC: NEGATIVE MG/DL
RBC # BLD AUTO: 4.07 M/UL (ref 4.2–5.9)
SODIUM SERPL-SCNC: 138 MMOL/L (ref 136–145)
SP GR UR STRIP.AUTO: 1.01 (ref 1–1.03)
UA COMPLETE W REFLEX CULTURE PNL UR: NORMAL
UA DIPSTICK W REFLEX MICRO PNL UR: NORMAL
URN SPEC COLLECT METH UR: NORMAL
UROBILINOGEN UR STRIP-ACNC: 1 E.U./DL
WBC # BLD AUTO: 8 K/UL (ref 4–11)

## 2024-10-22 PROCEDURE — 93010 ELECTROCARDIOGRAM REPORT: CPT | Performed by: INTERNAL MEDICINE

## 2024-10-22 PROCEDURE — 6370000000 HC RX 637 (ALT 250 FOR IP): Performed by: SURGERY

## 2024-10-22 PROCEDURE — 3600000002 HC SURGERY LEVEL 2 BASE: Performed by: SURGERY

## 2024-10-22 PROCEDURE — 2580000003 HC RX 258: Performed by: SURGERY

## 2024-10-22 PROCEDURE — 3700000001 HC ADD 15 MINUTES (ANESTHESIA): Performed by: SURGERY

## 2024-10-22 PROCEDURE — 94761 N-INVAS EAR/PLS OXIMETRY MLT: CPT

## 2024-10-22 PROCEDURE — 6360000002 HC RX W HCPCS: Performed by: NURSE ANESTHETIST, CERTIFIED REGISTERED

## 2024-10-22 PROCEDURE — 6370000000 HC RX 637 (ALT 250 FOR IP): Performed by: INTERNAL MEDICINE

## 2024-10-22 PROCEDURE — 1200000000 HC SEMI PRIVATE

## 2024-10-22 PROCEDURE — 85025 COMPLETE CBC W/AUTO DIFF WBC: CPT

## 2024-10-22 PROCEDURE — 2709999900 HC NON-CHARGEABLE SUPPLY: Performed by: SURGERY

## 2024-10-22 PROCEDURE — 2580000003 HC RX 258: Performed by: NURSE PRACTITIONER

## 2024-10-22 PROCEDURE — 87070 CULTURE OTHR SPECIMN AEROBIC: CPT

## 2024-10-22 PROCEDURE — 80053 COMPREHEN METABOLIC PANEL: CPT

## 2024-10-22 PROCEDURE — 6370000000 HC RX 637 (ALT 250 FOR IP): Performed by: NURSE PRACTITIONER

## 2024-10-22 PROCEDURE — 6360000002 HC RX W HCPCS: Performed by: INTERNAL MEDICINE

## 2024-10-22 PROCEDURE — 83735 ASSAY OF MAGNESIUM: CPT

## 2024-10-22 PROCEDURE — 99233 SBSQ HOSP IP/OBS HIGH 50: CPT | Performed by: NURSE PRACTITIONER

## 2024-10-22 PROCEDURE — 87076 CULTURE ANAEROBE IDENT EACH: CPT

## 2024-10-22 PROCEDURE — 0W9M0ZZ DRAINAGE OF MALE PERINEUM, OPEN APPROACH: ICD-10-PCS | Performed by: SURGERY

## 2024-10-22 PROCEDURE — 2580000003 HC RX 258: Performed by: NURSE ANESTHETIST, CERTIFIED REGISTERED

## 2024-10-22 PROCEDURE — 3700000000 HC ANESTHESIA ATTENDED CARE: Performed by: SURGERY

## 2024-10-22 PROCEDURE — 36415 COLL VENOUS BLD VENIPUNCTURE: CPT

## 2024-10-22 PROCEDURE — 6360000002 HC RX W HCPCS: Performed by: SURGERY

## 2024-10-22 PROCEDURE — 2700000000 HC OXYGEN THERAPY PER DAY

## 2024-10-22 PROCEDURE — 6360000002 HC RX W HCPCS: Performed by: NURSE PRACTITIONER

## 2024-10-22 PROCEDURE — 81003 URINALYSIS AUTO W/O SCOPE: CPT

## 2024-10-22 PROCEDURE — 7100000001 HC PACU RECOVERY - ADDTL 15 MIN: Performed by: SURGERY

## 2024-10-22 PROCEDURE — 2500000003 HC RX 250 WO HCPCS: Performed by: NURSE ANESTHETIST, CERTIFIED REGISTERED

## 2024-10-22 PROCEDURE — A4217 STERILE WATER/SALINE, 500 ML: HCPCS | Performed by: SURGERY

## 2024-10-22 PROCEDURE — 87075 CULTR BACTERIA EXCEPT BLOOD: CPT

## 2024-10-22 PROCEDURE — 99222 1ST HOSP IP/OBS MODERATE 55: CPT | Performed by: SURGERY

## 2024-10-22 PROCEDURE — 87205 SMEAR GRAM STAIN: CPT

## 2024-10-22 PROCEDURE — 94640 AIRWAY INHALATION TREATMENT: CPT

## 2024-10-22 PROCEDURE — 87641 MR-STAPH DNA AMP PROBE: CPT

## 2024-10-22 PROCEDURE — 3600000012 HC SURGERY LEVEL 2 ADDTL 15MIN: Performed by: SURGERY

## 2024-10-22 PROCEDURE — 7100000000 HC PACU RECOVERY - FIRST 15 MIN: Performed by: SURGERY

## 2024-10-22 RX ORDER — SODIUM CHLORIDE 0.9 % (FLUSH) 0.9 %
5-40 SYRINGE (ML) INJECTION EVERY 12 HOURS SCHEDULED
Status: DISCONTINUED | OUTPATIENT
Start: 2024-10-22 | End: 2024-10-22 | Stop reason: HOSPADM

## 2024-10-22 RX ORDER — BUPIVACAINE HYDROCHLORIDE 5 MG/ML
INJECTION, SOLUTION PERINEURAL PRN
Status: DISCONTINUED | OUTPATIENT
Start: 2024-10-22 | End: 2024-10-22 | Stop reason: ALTCHOICE

## 2024-10-22 RX ORDER — ONDANSETRON 2 MG/ML
INJECTION INTRAMUSCULAR; INTRAVENOUS
Status: DISCONTINUED | OUTPATIENT
Start: 2024-10-22 | End: 2024-10-22 | Stop reason: SDUPTHER

## 2024-10-22 RX ORDER — ROCURONIUM BROMIDE 10 MG/ML
INJECTION, SOLUTION INTRAVENOUS
Status: DISCONTINUED | OUTPATIENT
Start: 2024-10-22 | End: 2024-10-22 | Stop reason: SDUPTHER

## 2024-10-22 RX ORDER — PROPOFOL 10 MG/ML
INJECTION, EMULSION INTRAVENOUS
Status: DISCONTINUED | OUTPATIENT
Start: 2024-10-22 | End: 2024-10-22 | Stop reason: SDUPTHER

## 2024-10-22 RX ORDER — DIPHENHYDRAMINE HYDROCHLORIDE 50 MG/ML
12.5 INJECTION INTRAMUSCULAR; INTRAVENOUS
Status: DISCONTINUED | OUTPATIENT
Start: 2024-10-22 | End: 2024-10-22 | Stop reason: HOSPADM

## 2024-10-22 RX ORDER — MAGNESIUM SULFATE 1 G/100ML
1000 INJECTION INTRAVENOUS ONCE
Status: DISCONTINUED | OUTPATIENT
Start: 2024-10-22 | End: 2024-10-22

## 2024-10-22 RX ORDER — SODIUM CHLORIDE, SODIUM LACTATE, POTASSIUM CHLORIDE, CALCIUM CHLORIDE 600; 310; 30; 20 MG/100ML; MG/100ML; MG/100ML; MG/100ML
INJECTION, SOLUTION INTRAVENOUS
Status: DISCONTINUED | OUTPATIENT
Start: 2024-10-22 | End: 2024-10-22 | Stop reason: SDUPTHER

## 2024-10-22 RX ORDER — SODIUM CHLORIDE AND POTASSIUM CHLORIDE 150; 900 MG/100ML; MG/100ML
INJECTION, SOLUTION INTRAVENOUS CONTINUOUS
Status: DISCONTINUED | OUTPATIENT
Start: 2024-10-22 | End: 2024-10-22

## 2024-10-22 RX ORDER — OXYCODONE HYDROCHLORIDE 5 MG/1
10 TABLET ORAL PRN
Status: DISCONTINUED | OUTPATIENT
Start: 2024-10-22 | End: 2024-10-22 | Stop reason: HOSPADM

## 2024-10-22 RX ORDER — SODIUM CHLORIDE 9 MG/ML
INJECTION, SOLUTION INTRAVENOUS PRN
Status: DISCONTINUED | OUTPATIENT
Start: 2024-10-22 | End: 2024-10-22 | Stop reason: HOSPADM

## 2024-10-22 RX ORDER — MAGNESIUM SULFATE 1 G/100ML
1000 INJECTION INTRAVENOUS ONCE
Status: COMPLETED | OUTPATIENT
Start: 2024-10-22 | End: 2024-10-22

## 2024-10-22 RX ORDER — FENTANYL CITRATE 50 UG/ML
INJECTION, SOLUTION INTRAMUSCULAR; INTRAVENOUS
Status: DISCONTINUED | OUTPATIENT
Start: 2024-10-22 | End: 2024-10-22 | Stop reason: SDUPTHER

## 2024-10-22 RX ORDER — SODIUM CHLORIDE 0.9 % (FLUSH) 0.9 %
5-40 SYRINGE (ML) INJECTION PRN
Status: DISCONTINUED | OUTPATIENT
Start: 2024-10-22 | End: 2024-10-22 | Stop reason: HOSPADM

## 2024-10-22 RX ORDER — MEPERIDINE HYDROCHLORIDE 25 MG/ML
12.5 INJECTION INTRAMUSCULAR; INTRAVENOUS; SUBCUTANEOUS EVERY 5 MIN PRN
Status: DISCONTINUED | OUTPATIENT
Start: 2024-10-22 | End: 2024-10-22 | Stop reason: HOSPADM

## 2024-10-22 RX ORDER — GLYCOPYRROLATE 0.2 MG/ML
INJECTION INTRAMUSCULAR; INTRAVENOUS
Status: DISCONTINUED | OUTPATIENT
Start: 2024-10-22 | End: 2024-10-22 | Stop reason: SDUPTHER

## 2024-10-22 RX ORDER — IPRATROPIUM BROMIDE AND ALBUTEROL SULFATE 2.5; .5 MG/3ML; MG/3ML
1 SOLUTION RESPIRATORY (INHALATION) ONCE
Status: CANCELLED | OUTPATIENT
Start: 2024-10-22

## 2024-10-22 RX ORDER — ONDANSETRON 2 MG/ML
4 INJECTION INTRAMUSCULAR; INTRAVENOUS
Status: DISCONTINUED | OUTPATIENT
Start: 2024-10-22 | End: 2024-10-22 | Stop reason: HOSPADM

## 2024-10-22 RX ORDER — MAGNESIUM HYDROXIDE 1200 MG/15ML
LIQUID ORAL CONTINUOUS PRN
Status: COMPLETED | OUTPATIENT
Start: 2024-10-22 | End: 2024-10-22

## 2024-10-22 RX ORDER — LABETALOL HYDROCHLORIDE 5 MG/ML
5 INJECTION, SOLUTION INTRAVENOUS EVERY 10 MIN PRN
Status: DISCONTINUED | OUTPATIENT
Start: 2024-10-22 | End: 2024-10-22 | Stop reason: HOSPADM

## 2024-10-22 RX ORDER — OXYCODONE HYDROCHLORIDE 5 MG/1
5 TABLET ORAL PRN
Status: DISCONTINUED | OUTPATIENT
Start: 2024-10-22 | End: 2024-10-22 | Stop reason: HOSPADM

## 2024-10-22 RX ORDER — NALOXONE HYDROCHLORIDE 0.4 MG/ML
INJECTION, SOLUTION INTRAMUSCULAR; INTRAVENOUS; SUBCUTANEOUS PRN
Status: DISCONTINUED | OUTPATIENT
Start: 2024-10-22 | End: 2024-10-22 | Stop reason: HOSPADM

## 2024-10-22 RX ORDER — LIDOCAINE HYDROCHLORIDE 20 MG/ML
INJECTION, SOLUTION INFILTRATION; PERINEURAL
Status: DISCONTINUED | OUTPATIENT
Start: 2024-10-22 | End: 2024-10-22 | Stop reason: SDUPTHER

## 2024-10-22 RX ADMIN — GABAPENTIN 800 MG: 400 CAPSULE ORAL at 15:40

## 2024-10-22 RX ADMIN — FENTANYL CITRATE 50 MCG: 50 INJECTION INTRAMUSCULAR; INTRAVENOUS at 14:15

## 2024-10-22 RX ADMIN — FERROUS SULFATE TAB 325 MG (65 MG ELEMENTAL FE) 325 MG: 325 (65 FE) TAB at 08:22

## 2024-10-22 RX ADMIN — POLYETHYLENE GLYCOL (3350) 17 G: 17 POWDER, FOR SOLUTION ORAL at 21:49

## 2024-10-22 RX ADMIN — EZETIMIBE 10 MG: 10 TABLET ORAL at 21:38

## 2024-10-22 RX ADMIN — Medication 10 ML: at 21:40

## 2024-10-22 RX ADMIN — LIDOCAINE HYDROCHLORIDE 60 MG: 20 INJECTION, SOLUTION INFILTRATION; PERINEURAL at 14:11

## 2024-10-22 RX ADMIN — DILTIAZEM HYDROCHLORIDE 180 MG: 180 CAPSULE, EXTENDED RELEASE ORAL at 08:24

## 2024-10-22 RX ADMIN — ATORVASTATIN CALCIUM 40 MG: 40 TABLET, FILM COATED ORAL at 08:23

## 2024-10-22 RX ADMIN — PHENYLEPHRINE HYDROCHLORIDE 50 MCG: 10 INJECTION INTRAVENOUS at 14:11

## 2024-10-22 RX ADMIN — OXYCODONE 10 MG: 5 TABLET ORAL at 21:38

## 2024-10-22 RX ADMIN — TAMSULOSIN HYDROCHLORIDE 0.4 MG: 0.4 CAPSULE ORAL at 08:23

## 2024-10-22 RX ADMIN — GABAPENTIN 800 MG: 400 CAPSULE ORAL at 08:25

## 2024-10-22 RX ADMIN — METOPROLOL SUCCINATE 50 MG: 50 TABLET, EXTENDED RELEASE ORAL at 08:26

## 2024-10-22 RX ADMIN — SUGAMMADEX 300 MG: 100 INJECTION, SOLUTION INTRAVENOUS at 14:24

## 2024-10-22 RX ADMIN — SODIUM CHLORIDE, POTASSIUM CHLORIDE, SODIUM LACTATE AND CALCIUM CHLORIDE: 600; 310; 30; 20 INJECTION, SOLUTION INTRAVENOUS at 14:06

## 2024-10-22 RX ADMIN — PROPOFOL 150 MG: 10 INJECTION, EMULSION INTRAVENOUS at 14:11

## 2024-10-22 RX ADMIN — CHOLECALCIFEROL TAB 125 MCG (5000 UNIT) 5000 UNITS: 125 TAB at 08:24

## 2024-10-22 RX ADMIN — ROCURONIUM BROMIDE 40 MG: 10 INJECTION, SOLUTION INTRAVENOUS at 14:11

## 2024-10-22 RX ADMIN — POTASSIUM CHLORIDE AND SODIUM CHLORIDE: 900; 150 INJECTION, SOLUTION INTRAVENOUS at 10:32

## 2024-10-22 RX ADMIN — IPRATROPIUM BROMIDE AND ALBUTEROL SULFATE 1 DOSE: 2.5; .5 SOLUTION RESPIRATORY (INHALATION) at 19:06

## 2024-10-22 RX ADMIN — GLYCOPYRROLATE 0.1 MG: 0.2 INJECTION, SOLUTION INTRAMUSCULAR; INTRAVENOUS at 14:11

## 2024-10-22 RX ADMIN — VANCOMYCIN HYDROCHLORIDE 1250 MG: 1.25 INJECTION, SOLUTION INTRAVITREAL at 02:56

## 2024-10-22 RX ADMIN — ONDANSETRON 4 MG: 2 INJECTION INTRAMUSCULAR; INTRAVENOUS at 14:11

## 2024-10-22 RX ADMIN — IPRATROPIUM BROMIDE AND ALBUTEROL SULFATE 1 DOSE: .5; 2.5 SOLUTION RESPIRATORY (INHALATION) at 15:11

## 2024-10-22 RX ADMIN — CEFEPIME 2000 MG: 2 INJECTION, POWDER, FOR SOLUTION INTRAVENOUS at 10:46

## 2024-10-22 RX ADMIN — IPRATROPIUM BROMIDE AND ALBUTEROL SULFATE 1 DOSE: 2.5; .5 SOLUTION RESPIRATORY (INHALATION) at 09:00

## 2024-10-22 RX ADMIN — GABAPENTIN 800 MG: 400 CAPSULE ORAL at 21:38

## 2024-10-22 RX ADMIN — ALLOPURINOL 300 MG: 300 TABLET ORAL at 08:23

## 2024-10-22 RX ADMIN — FUROSEMIDE 20 MG: 20 TABLET ORAL at 08:24

## 2024-10-22 RX ADMIN — DULOXETINE HYDROCHLORIDE 60 MG: 60 CAPSULE, DELAYED RELEASE ORAL at 08:23

## 2024-10-22 RX ADMIN — FOLIC ACID 1000 MCG: 1 TABLET ORAL at 08:25

## 2024-10-22 RX ADMIN — VANCOMYCIN HYDROCHLORIDE 1250 MG: 1.25 INJECTION, SOLUTION INTRAVITREAL at 16:48

## 2024-10-22 RX ADMIN — INSULIN GLARGINE 15 UNITS: 100 INJECTION, SOLUTION SUBCUTANEOUS at 16:44

## 2024-10-22 RX ADMIN — MAGNESIUM SULFATE IN DEXTROSE 1000 MG: 10 INJECTION, SOLUTION INTRAVENOUS at 15:49

## 2024-10-22 ASSESSMENT — PAIN DESCRIPTION - LOCATION: LOCATION: FOOT;GROIN

## 2024-10-22 ASSESSMENT — PAIN SCALES - GENERAL
PAINLEVEL_OUTOF10: 8
PAINLEVEL_OUTOF10: 0
PAINLEVEL_OUTOF10: 8
PAINLEVEL_OUTOF10: 0
PAINLEVEL_OUTOF10: 5

## 2024-10-22 ASSESSMENT — PAIN DESCRIPTION - ONSET: ONSET: SUDDEN

## 2024-10-22 ASSESSMENT — PAIN DESCRIPTION - ORIENTATION: ORIENTATION: RIGHT;LEFT

## 2024-10-22 ASSESSMENT — PAIN DESCRIPTION - FREQUENCY: FREQUENCY: INTERMITTENT

## 2024-10-22 ASSESSMENT — PAIN DESCRIPTION - DESCRIPTORS
DESCRIPTORS: BURNING
DESCRIPTORS: BURNING;ITCHING;PRESSURE
DESCRIPTORS: ACHING;BURNING
DESCRIPTORS: BURNING;ITCHING

## 2024-10-22 ASSESSMENT — PAIN DESCRIPTION - PAIN TYPE
TYPE: ACUTE PAIN;CHRONIC PAIN
TYPE: ACUTE PAIN

## 2024-10-22 ASSESSMENT — PAIN - FUNCTIONAL ASSESSMENT
PAIN_FUNCTIONAL_ASSESSMENT: 0-10
PAIN_FUNCTIONAL_ASSESSMENT: ACTIVITIES ARE NOT PREVENTED

## 2024-10-22 ASSESSMENT — COPD QUESTIONNAIRES: CAT_SEVERITY: SEVERE

## 2024-10-22 NOTE — FLOWSHEET NOTE
SN assessed pts pain level. SN offered medication to pt. Pt refused medication because they do not want to become constipated after surgery.

## 2024-10-22 NOTE — ACP (ADVANCE CARE PLANNING)
Advance Care Planning     General Advance Care Planning (ACP) Conversation    Date of Conversation: 10/22/2024  Conducted with: Patient with Decision Making Capacity  Other persons present: None    Healthcare Decision Maker:   Primary Decision Maker: Reagan Early - Brother/Sister - [phone]       Content/Action Overview:  DECLINED ACP Conversation - will revisit periodically  Reviewed DNR/DNI and patient elects Full Code (Attempt Resuscitation)        Length of Voluntary ACP Conversation in minutes:  <16 minutes (Non-Billable)    Grecia Hernandez RN

## 2024-10-22 NOTE — ANESTHESIA POSTPROCEDURE EVALUATION
Department of Anesthesiology  Postprocedure Note    Patient: Sandor Early  MRN: [id number]  YOB: 1955  Date of evaluation: 10/22/2024    Procedure Summary       Date: 10/22/24 Room / Location: [address]    Anesthesia Start: 1406 Anesthesia Stop: 1437    Procedure: PERINEAL INCISION AND DRAINAGE (Perineum) Diagnosis:       Perineal abscess      (Perineal abscess [L02.215])    Surgeons: Samuel Velasquez MD Responsible Provider: Atiya Camacho MD    Anesthesia Type: general ASA Status: 4 - Emergent            Anesthesia Type: No value filed.    Radha Phase I: Radha Score: 8    Radha Phase II:      Anesthesia Post Evaluation    Comments: Postoperative Anesthesia Note    Name:    Sandor Early  MRN:      [id number]    Patient Vitals in the past 12 hrs:  10/22/24 1508, Pulse:98, SpO2:95 %  10/22/24 1445, BP:93/64, Pulse:(!) 107, Resp:20, SpO2:93 %  10/22/24 1436, BP:134/71, Temp:97.5 °F (36.4 °C), Temp src:Temporal, Pulse:(!) 101, Resp:16, SpO2:97 %  10/22/24 1154, BP:126/64, Temp:97.2 °F (36.2 °C), Temp src:Temporal, Pulse:85, Resp:14  10/22/24 0902, Pulse:93, Resp:12, SpO2:100 %  10/22/24 0800, Pulse:88  10/22/24 0715, BP:(!) 152/75, Temp:98.4 °F (36.9 °C), Temp src:Oral, Pulse:88, Resp:19, SpO2:99 %     LABS:    CBC  Lab Results       Component                Value               Date/Time                  WBC                      8.0                 10/22/2024 06:36 AM        HGB                      12.9 (L)            10/22/2024 06:36 AM        HCT                      37.8 (L)            10/22/2024 06:36 AM        PLT                      196                 10/22/2024 06:36 AM   RENAL  Lab Results       Component                Value               Date/Time                  NA                       138                 10/22/2024 06:36 AM        K                        3.6                 10/22/2024 06:36 AM        CL                       100

## 2024-10-22 NOTE — BRIEF OP NOTE
Brief Postoperative Note      Patient: Sandor Early  YOB: 1955  MRN: [id number]    Date of Procedure: 10/22/2024    Pre-Op Diagnosis Codes:      * Perineal abscess [L02.215]    Post-Op Diagnosis: Same       Procedure(s):  PERINEAL INCISION AND DRAINAGE    Surgeon(s):  Lindy Velasquez MD    Assistant:  Surgical Assistant: Vielka Estrada    Anesthesia: General    Estimated Blood Loss (mL): Minimal    Complications: None    Specimens:   ID Type Source Tests Collected by Time Destination   1 : perineal abscess cultures Tissue Tissue CULTURE, ANAEROBIC AND AEROBIC Lindy Velasquez MD 10/22/2024 1422        Implants:  * No implants in log *      Drains:   [REMOVED] Urinary Catheter 02/09/24 [place]rejon (Removed)       [REMOVED] Urinary Catheter 02/14/24 (Removed)       Findings:  Infection Present At Time Of Surgery (PATOS) (choose all levels that have infection present):  - Superficial Infection (skin/subcutaneous) present as evidenced by pus  - Deep Infection (muscle/fascia) present as evidenced by pus  Other Findings: As above    Electronically signed by LINDY VELASQUEZ MD on 10/22/2024 at 2:28 PM

## 2024-10-22 NOTE — ED NOTES
Report given to Brianna MEEKS at [address] [place] [address] for pt care transfer. [facility] on scene at this time for transfer.

## 2024-10-22 NOTE — CONSULTS
[facility]    HPI:  Patient is 69 y.o. year old male seen at request of Hospitalist.  He reports pain and swelling located on  base of scrotal area .  There has been any drainage.  There has not been previous treatment.  There is not previous history of similar.  There has not been fever.      Past Medical History:   Diagnosis Date    Bladder outlet obstruction 03/05/2017    Carpal tunnel syndrome of left wrist 04/02/2013    Cellulitis of right lower extremity 03/27/2023    Cellulitis of right upper extremity 03/01/2023    w/ Group A Strep bacteremia    COPD exacerbation (HCC) 03/02/2023    Cough syncope 01/10/2023    Diabetic ketoacidosis without coma associated with type 2 diabetes mellitus (HCC) 02/18/2018    GI bleed 05/08/2022    Gout 02/01/2013    Hilar adenopathy     Iron deficiency anemia     Malignant neoplasm of urinary bladder (HCC) 02/09/2016    Multiple duodenal ulcers     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) 01/10/2022    Polyp of colon     Rectal bleeding     Seizures ([facility])     ongoing, began after swine flu vaccination    Severe claudication ([facility]) 01/24/2020    Ulnar nerve entrapment 02/09/2016    Uncontrolled hypertension 11/12/2019       Past Surgical History:   Procedure Laterality Date    ANKLE FRACTURE SURGERY Left 2/12/2024    OPEN REDUCTION INTERNAL FIXATION OF LEFT ANKLE FRACTURE DISLOCATION WITH SYNDESMOSIS REPAIR performed by Sudeep Ruth MD at [facility] OR    AORTO-ILIAC BYPASS GRAFT N/A 08/24/2020    AORTOBIFEMORAL BYPASS GRAFT performed by Sam Fair MD at [facility] OR    BRONCHOSCOPY  02/07/2011    bronch with EBUS    CATARACT REMOVAL Left     COLONOSCOPY N/A 07/23/2018    COLONOSCOPY WITH BIOPSY performed by Maicol Fournier MD at [facility] ENDOSCOPY    COLONOSCOPY N/A 07/23/2018    COLONOSCOPY POLYPECTOMY SNARE/COLD BIOPSY performed by Maicol Fournier MD at [facility] ENDOSCOPY    COLONOSCOPY N/A 05/10/2022    COLON W/ANES. performed by

## 2024-10-22 NOTE — CONSULTS
Pharmacy to dose IV Vanco - SSTI  2.5g dose given at 1500 today to provide Gram+ organism coverage to include MRSA.  Will continue to dose at 1,250mg IV Q12hrs.  Trough check 10/23 at 1400.  Pred  and Tr 15.6 at steady state.  Sathya Wright [facility] PharmD 10/21/2024 10:32 PM

## 2024-10-22 NOTE — FLOWSHEET NOTE
10/22/24 0149   Vital Signs   Temp 98.1 °F (36.7 °C)   Temp Source Oral   Pulse 96   Heart Rate Source Monitor   Respirations 20   BP (!) 161/59   MAP (Calculated) 93   BP Location Right upper arm   BP Method Automatic   Patient Position Semi wlers   Pain Assessment   Pain Assessment None - Denies Pain   Pain Level 0   Opioid-Induced Sedation   POSS Score 1   Oxygen Therapy   SpO2 97 %   O2 Device Nasal cannula   O2 Flow Rate (L/min) 4 L/min     Pt VS as shown above.

## 2024-10-22 NOTE — ANESTHESIA PRE PROCEDURE
entrapment 2016    Uncontrolled hypertension 2019       Past Surgical History:        Procedure Laterality Date    ANKLE FRACTURE SURGERY Left 2024    OPEN REDUCTION INTERNAL FIXATION OF LEFT ANKLE FRACTURE DISLOCATION WITH SYNDESMOSIS REPAIR performed by Sudeep Ruth MD at [facility] OR    AORTO-ILIAC BYPASS GRAFT N/A 2020    AORTOBIFEMORAL BYPASS GRAFT performed by Sam Fair MD at [facility] OR    BRONCHOSCOPY  2011    bronch with EBUS    CATARACT REMOVAL Left     COLONOSCOPY N/A 2018    COLONOSCOPY WITH BIOPSY performed by Maicol Fournier MD at [facility] ENDOSCOPY    COLONOSCOPY N/A 2018    COLONOSCOPY POLYPECTOMY SNARE/COLD BIOPSY performed by Maicol Fournier MD at [facility] ENDOSCOPY    COLONOSCOPY N/A 05/10/2022    COLON W/ANES. performed by Diana Gifford MD at [facility] ENDOSCOPY    CYSTOSCOPY  2015    Urethral Dilatation, Resection of Bladder Tumor    CYSTOSCOPY  2014    w/ bladder biopsy    CYSTOSCOPY  2015    w/ urethral dilatation, bladder tumor follow up    CYSTOSCOPY  2016    fulgeration of bladder tumor    ELBOW SURGERY Left     ENDOSCOPY, SMALL BOWEL N/A 2019    BOWEL SMALL CAPSULE ENDOSCOPY performed by Maicol Fournier MD at [facility] ENDOSCOPY    UPPER GASTROINTESTINAL ENDOSCOPY N/A 2018    EGD BIOPSY performed by Maicol Fournier MD at [facility] ENDOSCOPY    UPPER GASTROINTESTINAL ENDOSCOPY N/A 2022    EGD BIOPSY performed by Diana Gifford MD at [facility] ENDOSCOPY       Social History:    Social History     Tobacco Use    Smoking status: Former     Current packs/day: 0.00     Average packs/day: 0.5 packs/day for 42.0 years (21.0 ttl pk-yrs)     Types: Cigarettes, Cigars     Start date: 2018     Quit date: 2023     Years since quittin.0    Smokeless tobacco: Former     Types: Snuff   Substance Use Topics    Alcohol use: Yes     Alcohol/week: 8.0 standard drinks of alcohol

## 2024-10-22 NOTE — CARE COORDINATION
Case Management Assessment  Initial Evaluation    Date/Time of Evaluation: 10/22/2024 8:17 AM  Assessment Completed by: Grecia Hernandez RN    If patient is discharged prior to next notation, then this note serves as note for discharge by case management.    Patient Name: Sandor Early                   YOB: 1955  Diagnosis: Scrotal abscess [N49.2]  Perineal abscess [L02.215]                   Date / Time: 10/21/2024 11:32 AM    Patient Admission Status: Inpatient   Readmission Risk (Low < 19, Mod (19-27), High > 27): Readmission Risk Score: 13.8    Current PCP: Cooper Martinez MD  PCP verified by CM? Yes    Chart Reviewed: Yes      History Provided by: Patient  Patient Orientation: Alert and Oriented    Patient Cognition: Alert    Hospitalization in the last 30 days (Readmission):  No    If yes, Readmission Assessment in CM Navigator will be completed.    Advance Directives:      Code Status: Full Code   Patient's Primary Decision Maker is: Legal Next of Kin    Primary Decision Maker: SharathEd - Brother/Sister - [phone]    Discharge Planning:    Patient lives with: Children Type of Home: Trailer/Mobile Home  Primary Care Giver: Self  Patient Support Systems include: Children   Current Financial resources: Medicaid, Medicare  Current community resources: None  Current services prior to admission: None, Oxygen Therapy (4L baseline [employer])            Current DME:              Type of Home Care services:  None    ADLS  Prior functional level: Independent in ADLs/IADLs  Current functional level: Independent in ADLs/IADLs    PT AM-PAC:   /24  OT AM-PAC:   /24    Family can provide assistance at DC: Yes  Would you like Case Management to discuss the discharge plan with any other family members/significant others, and if so, who? No  Plans to Return to Present Housing: Yes  Other Identified Issues/Barriers to RETURNING to current housing: na    Potential Assistance needed at discharge: N/A

## 2024-10-22 NOTE — H&P
2.6 cm.  No obvious internal soft tissue gas identified. Peritoneum/Retroperitoneum: Postsurgical change is seen involving the aorta. No retroperitoneal adenopathy.  No aortic aneurysm. Bones/Soft Tissues: Spurring is seen in the spine and hips.  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 Lobular contour to the liver is seen suggesting underlying cirrhosis     XR CHEST (2 VW)    Result Date: 10/2/2024  EXAMINATION: TWO XRAY VIEWS OF THE CHEST 10/2/2024 12:26 pm COMPARISON: 02/08/2024 HISTORY: ORDERING SYSTEM PROVIDED HISTORY: Abnormal lung sounds TECHNOLOGIST PROVIDED HISTORY: Reason for Exam: Cough, SOB. Checking on spot in left lung FINDINGS: The lungs are without acute focal process.  There is no effusion or pneumothorax. The cardiomediastinal silhouette is stable. The osseous structures are stable.     No acute process.       PCP: Cooper Martinez MD    Past Medical History:        Diagnosis Date    Bladder outlet obstruction 03/05/2017    Carpal tunnel syndrome of left wrist 04/02/2013    Cellulitis of right lower extremity 03/27/2023    Cellulitis of right upper extremity 03/01/2023    w/ Group A Strep bacteremia    COPD exacerbation (HCC) 03/02/2023    Cough syncope 01/10/2023    Diabetic ketoacidosis without coma associated with type 2 diabetes mellitus (HCC) 02/18/2018    GI bleed 05/08/2022    Gout 02/01/2013    Hilar adenopathy     Iron deficiency anemia     Malignant neoplasm of urinary bladder (HCC) 02/09/2016    Multiple duodenal ulcers     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

## 2024-10-23 PROBLEM — J96.11 CHRONIC HYPOXEMIC RESPIRATORY FAILURE: Status: ACTIVE | Noted: 2024-10-23

## 2024-10-23 PROBLEM — E11.9 TYPE 2 DIABETES MELLITUS WITHOUT COMPLICATION (HCC): Status: ACTIVE | Noted: 2024-10-23

## 2024-10-23 LAB
ALBUMIN SERPL-MCNC: 3.4 G/DL (ref 3.4–5)
ALBUMIN/GLOB SERPL: 1 {RATIO} (ref 1.1–2.2)
ALP SERPL-CCNC: 185 U/L (ref 40–129)
ALT SERPL-CCNC: 20 U/L (ref 10–40)
ANION GAP SERPL CALCULATED.3IONS-SCNC: 9 MMOL/L (ref 3–16)
AST SERPL-CCNC: 17 U/L (ref 15–37)
BASOPHILS # BLD: 0 K/UL (ref 0–0.2)
BASOPHILS NFR BLD: 0.7 %
BILIRUB SERPL-MCNC: <0.2 MG/DL (ref 0–1)
BUN SERPL-MCNC: 6 MG/DL (ref 7–20)
CALCIUM SERPL-MCNC: 9.6 MG/DL (ref 8.3–10.6)
CHLORIDE SERPL-SCNC: 98 MMOL/L (ref 99–110)
CO2 SERPL-SCNC: 33 MMOL/L (ref 21–32)
CREAT SERPL-MCNC: 0.7 MG/DL (ref 0.8–1.3)
DEPRECATED RDW RBC AUTO: 15.1 % (ref 12.4–15.4)
EOSINOPHIL # BLD: 0.2 K/UL (ref 0–0.6)
EOSINOPHIL NFR BLD: 4.3 %
GFR SERPLBLD CREATININE-BSD FMLA CKD-EPI: >90 ML/MIN/{1.73_M2}
GLUCOSE BLD-MCNC: 105 MG/DL (ref 70–99)
GLUCOSE BLD-MCNC: 108 MG/DL (ref 70–99)
GLUCOSE BLD-MCNC: 123 MG/DL (ref 70–99)
GLUCOSE BLD-MCNC: 161 MG/DL (ref 70–99)
GLUCOSE SERPL-MCNC: 105 MG/DL (ref 70–99)
HCT VFR BLD AUTO: 37.5 % (ref 40.5–52.5)
HGB BLD-MCNC: 12.6 G/DL (ref 13.5–17.5)
LYMPHOCYTES # BLD: 1.3 K/UL (ref 1–5.1)
LYMPHOCYTES NFR BLD: 26.1 %
MAGNESIUM SERPL-MCNC: 1.8 MG/DL (ref 1.8–2.4)
MCH RBC QN AUTO: 31.2 PG (ref 26–34)
MCHC RBC AUTO-ENTMCNC: 33.7 G/DL (ref 31–36)
MCV RBC AUTO: 92.7 FL (ref 80–100)
MONOCYTES # BLD: 0.7 K/UL (ref 0–1.3)
MONOCYTES NFR BLD: 13.4 %
MRSA DNA SPEC QL NAA+PROBE: NORMAL
NEUTROPHILS # BLD: 2.8 K/UL (ref 1.7–7.7)
NEUTROPHILS NFR BLD: 55.5 %
PERFORMED ON: ABNORMAL
PLATELET # BLD AUTO: 193 K/UL (ref 135–450)
PMV BLD AUTO: 7.3 FL (ref 5–10.5)
POTASSIUM SERPL-SCNC: 3.5 MMOL/L (ref 3.5–5.1)
PROT SERPL-MCNC: 6.7 G/DL (ref 6.4–8.2)
RBC # BLD AUTO: 4.05 M/UL (ref 4.2–5.9)
SODIUM SERPL-SCNC: 140 MMOL/L (ref 136–145)
VANCOMYCIN TROUGH SERPL-MCNC: 15.9 UG/ML (ref 10–20)
WBC # BLD AUTO: 5.1 K/UL (ref 4–11)

## 2024-10-23 PROCEDURE — 2580000003 HC RX 258: Performed by: SURGERY

## 2024-10-23 PROCEDURE — 6370000000 HC RX 637 (ALT 250 FOR IP): Performed by: INTERNAL MEDICINE

## 2024-10-23 PROCEDURE — 6370000000 HC RX 637 (ALT 250 FOR IP): Performed by: SURGERY

## 2024-10-23 PROCEDURE — 6360000002 HC RX W HCPCS: Performed by: SURGERY

## 2024-10-23 PROCEDURE — 83735 ASSAY OF MAGNESIUM: CPT

## 2024-10-23 PROCEDURE — 85025 COMPLETE CBC W/AUTO DIFF WBC: CPT

## 2024-10-23 PROCEDURE — 99232 SBSQ HOSP IP/OBS MODERATE 35: CPT | Performed by: INTERNAL MEDICINE

## 2024-10-23 PROCEDURE — 94640 AIRWAY INHALATION TREATMENT: CPT

## 2024-10-23 PROCEDURE — 36415 COLL VENOUS BLD VENIPUNCTURE: CPT

## 2024-10-23 PROCEDURE — 99024 POSTOP FOLLOW-UP VISIT: CPT

## 2024-10-23 PROCEDURE — 80202 ASSAY OF VANCOMYCIN: CPT

## 2024-10-23 PROCEDURE — 2700000000 HC OXYGEN THERAPY PER DAY

## 2024-10-23 PROCEDURE — 1200000000 HC SEMI PRIVATE

## 2024-10-23 PROCEDURE — APPSS15 APP SPLIT SHARED TIME 0-15 MINUTES

## 2024-10-23 PROCEDURE — 80053 COMPREHEN METABOLIC PANEL: CPT

## 2024-10-23 PROCEDURE — 6360000002 HC RX W HCPCS: Performed by: INTERNAL MEDICINE

## 2024-10-23 PROCEDURE — 94761 N-INVAS EAR/PLS OXIMETRY MLT: CPT

## 2024-10-23 RX ORDER — IPRATROPIUM BROMIDE AND ALBUTEROL SULFATE 2.5; .5 MG/3ML; MG/3ML
1 SOLUTION RESPIRATORY (INHALATION) 2 TIMES DAILY
Status: DISCONTINUED | OUTPATIENT
Start: 2024-10-23 | End: 2024-10-25 | Stop reason: HOSPADM

## 2024-10-23 RX ORDER — VANCOMYCIN HCL IN 5 % DEXTROSE 1.25 G/25
1250 PLASTIC BAG, INJECTION (ML) INTRAVENOUS EVERY 12 HOURS
Status: DISCONTINUED | OUTPATIENT
Start: 2024-10-23 | End: 2024-10-25

## 2024-10-23 RX ADMIN — CEFEPIME 2000 MG: 2 INJECTION, POWDER, FOR SOLUTION INTRAVENOUS at 22:37

## 2024-10-23 RX ADMIN — FOLIC ACID 1000 MCG: 1 TABLET ORAL at 08:35

## 2024-10-23 RX ADMIN — METOPROLOL SUCCINATE 50 MG: 50 TABLET, EXTENDED RELEASE ORAL at 08:35

## 2024-10-23 RX ADMIN — Medication 10 ML: at 08:36

## 2024-10-23 RX ADMIN — Medication 10 ML: at 20:51

## 2024-10-23 RX ADMIN — ALLOPURINOL 300 MG: 300 TABLET ORAL at 08:35

## 2024-10-23 RX ADMIN — IPRATROPIUM BROMIDE AND ALBUTEROL SULFATE 1 DOSE: 2.5; .5 SOLUTION RESPIRATORY (INHALATION) at 07:42

## 2024-10-23 RX ADMIN — DULOXETINE HYDROCHLORIDE 60 MG: 60 CAPSULE, DELAYED RELEASE ORAL at 08:35

## 2024-10-23 RX ADMIN — GABAPENTIN 800 MG: 400 CAPSULE ORAL at 16:30

## 2024-10-23 RX ADMIN — FERROUS SULFATE TAB 325 MG (65 MG ELEMENTAL FE) 325 MG: 325 (65 FE) TAB at 08:35

## 2024-10-23 RX ADMIN — FUROSEMIDE 20 MG: 20 TABLET ORAL at 08:35

## 2024-10-23 RX ADMIN — CEFEPIME 2000 MG: 2 INJECTION, POWDER, FOR SOLUTION INTRAVENOUS at 10:38

## 2024-10-23 RX ADMIN — IPRATROPIUM BROMIDE AND ALBUTEROL SULFATE 1 DOSE: 2.5; .5 SOLUTION RESPIRATORY (INHALATION) at 19:30

## 2024-10-23 RX ADMIN — CEFEPIME 2000 MG: 2 INJECTION, POWDER, FOR SOLUTION INTRAVENOUS at 00:15

## 2024-10-23 RX ADMIN — CHOLECALCIFEROL TAB 125 MCG (5000 UNIT) 5000 UNITS: 125 TAB at 08:35

## 2024-10-23 RX ADMIN — ATORVASTATIN CALCIUM 40 MG: 40 TABLET, FILM COATED ORAL at 08:35

## 2024-10-23 RX ADMIN — GABAPENTIN 800 MG: 400 CAPSULE ORAL at 20:49

## 2024-10-23 RX ADMIN — VANCOMYCIN HYDROCHLORIDE 1250 MG: 1.25 INJECTION, SOLUTION INTRAVITREAL at 16:28

## 2024-10-23 RX ADMIN — VANCOMYCIN HYDROCHLORIDE 1250 MG: 1.25 INJECTION, SOLUTION INTRAVITREAL at 03:16

## 2024-10-23 RX ADMIN — DILTIAZEM HYDROCHLORIDE 180 MG: 180 CAPSULE, EXTENDED RELEASE ORAL at 08:35

## 2024-10-23 RX ADMIN — TAMSULOSIN HYDROCHLORIDE 0.4 MG: 0.4 CAPSULE ORAL at 08:35

## 2024-10-23 RX ADMIN — EZETIMIBE 10 MG: 10 TABLET ORAL at 20:50

## 2024-10-23 RX ADMIN — INSULIN GLARGINE 15 UNITS: 100 INJECTION, SOLUTION SUBCUTANEOUS at 16:30

## 2024-10-23 RX ADMIN — PANTOPRAZOLE SODIUM 40 MG: 40 TABLET, DELAYED RELEASE ORAL at 06:41

## 2024-10-23 RX ADMIN — ENOXAPARIN SODIUM 40 MG: 100 INJECTION SUBCUTANEOUS at 08:35

## 2024-10-23 RX ADMIN — GABAPENTIN 800 MG: 400 CAPSULE ORAL at 08:35

## 2024-10-23 ASSESSMENT — PAIN SCALES - GENERAL
PAINLEVEL_OUTOF10: 0
PAINLEVEL_OUTOF10: 0

## 2024-10-23 NOTE — CARE COORDINATION
Chart reviewed. Met with pt at bedside. Pt agreeable to [facility] for SN for wound care. No preferences. Referral called to [facility]. Spoke with Usha. Accepted pt. Will keep agency updated on DC.    1225- pt family called stating they will not be able to assist with daily dressing changes on days [facility] is not available. [facility] unable to send RN out daily for dressing changes. Pt agreeable to [facility] for wound care. Pt requests [facility]. Referral called to [facility]. [facility]. Awaiting return call.     1350-Call from Bzoena at [facility] stating no beds available at this time. Unable to accept.    1355- referral called to Andria at [facility]. [facility]. Awaiting return call.     1409- call back from Andria stating able to accept pt. Precert to be started by [facility] ARIEL in the am after PT/OT notes are in.

## 2024-10-23 NOTE — DISCHARGE INSTRUCTIONS
Your information:  Name: Sandor Early  : 1955    Your instructions:    Follow up with family doctor in 1 week.    What to do after you leave the hospital:    Recommended diet: diabetic diet    Recommended activity: activity as tolerated        The following personal items were collected during your admission and were returned to you:    Belongings  Dental Appliances: None  Vision - Corrective Lenses: None  Hearing Aid: None  Clothing: Undergarments, Socks, Shirt, Pants, Footwear  Jewelry: Necklace, Ring  Electronic Devices: Cell Phone  Weapons (Notify Protective Services/Security): None  Home Medications: None  Valuables Given To: Patient  Provide Name(s) of Who Valuable(s) Were Given To: patient    Information obtained by:  By signing below, I understand that if any problems occur once I leave the hospital I am to contact family doctor.  I understand and acknowledge receipt of the instructions indicated above.   [facility] KIARA [facility] IncDIANNA Burton M.D.              Levy Murray M.D.              [facility]     JORDYN Jackman M.D.  [facility] - Thursday only                                                                                                                                                         [facility]        [facility] Office         [facility] - Dr. Velasquez Only  [address]                     [address]                [address]  [address]                               [address]                                 [place] [address]  [place] [address]              [place] [address] [phone]      POST-OPERATIVE INSTRUCTIONS    Call the [place] office to schedule your post-operative appointment with

## 2024-10-23 NOTE — DISCHARGE INSTR - COC
Continuity of Care Form    Patient Name: Sandor Early   :  1955  MRN:  [id number]    Admit date:  10/21/2024  Discharge date:  10/25/24    Code Status Order: Full Code   Advance Directives:   Advance Care Flowsheet Documentation        Date/Time Healthcare Directive Type of Healthcare Directive Copy in Chart Healthcare Agent Appointed Healthcare Agent's Name Healthcare Agent's Phone Number    10/22/24 1205 No, patient does not have an advance directive for healthcare treatment  --  --  --  --  --                     Admitting Physician:  Shelby Hand MD  PCP: Cooper Martinez MD    Discharging Nurse: Lisa  Discharging Hospital Unit/Room#: 0220/0220-02  Discharging Unit Phone Number: [phone]    Emergency Contact:   Extended Emergency Contact Information  Primary Emergency Contact: Reagan Early  Home Phone: [phone]  Mobile Phone: [phone]  Relation: Brother/Sister  Secondary Emergency Contact: Kathleen Cortez   [facility]  Home Phone: [phone]  Mobile Phone: [phone]  Relation: Other    Past Surgical History:  Past Surgical History:   Procedure Laterality Date    ANKLE FRACTURE SURGERY Left 2024    OPEN REDUCTION INTERNAL FIXATION OF LEFT ANKLE FRACTURE DISLOCATION WITH SYNDESMOSIS REPAIR performed by Sudeep Ruth MD at [facility] OR    AORTO-ILIAC BYPASS GRAFT N/A 2020    AORTOBIFEMORAL BYPASS GRAFT performed by Sam Fair MD at [facility] OR    BRONCHOSCOPY  2011    bronch with EBUS    CATARACT REMOVAL Left     COLONOSCOPY N/A 2018    COLONOSCOPY WITH BIOPSY performed by Maicol oFurnier MD at [facility] ENDOSCOPY    COLONOSCOPY N/A 2018    COLONOSCOPY POLYPECTOMY SNARE/COLD BIOPSY performed by Maicol Fournier MD at [facility] ENDOSCOPY    COLONOSCOPY N/A 05/10/2022    COLON W/ANES. performed by Diana Gifford MD at [facility] ENDOSCOPY    CYSTOSCOPY  2015    Urethral Dilatation, Resection of Bladder Tumor    CYSTOSCOPY

## 2024-10-23 NOTE — FLOWSHEET NOTE
10/23/24 0018   Vital Signs   Temp 97.8 °F (36.6 °C)   Temp Source Oral   Pulse 68   Heart Rate Source Monitor   Respirations 16   /76   MAP (Calculated) 93   BP Location Right upper arm   BP Method Automatic   Patient Position Semi wlers   Pain Assessment   Pain Assessment None - Denies Pain   Pain Level 0   Opioid-Induced Sedation   POSS Score 1   Oxygen Therapy   SpO2 95 %   O2 Device Nasal cannula   O2 Flow Rate (L/min) 4 L/min     Pt VS as shown above.

## 2024-10-23 NOTE — PLAN OF CARE
Problem: Chronic Conditions and Co-morbidities  Goal: Patient's chronic conditions and co-morbidity symptoms are monitored and maintained or improved  Outcome: Progressing  Flowsheets (Taken 10/22/2024 0800 by Sagrario Zaidi RN)  Care Plan - Patient's Chronic Conditions and Co-Morbidity Symptoms are Monitored and Maintained or Improved: Monitor and assess patient's chronic conditions and comorbid symptoms for stability, deterioration, or improvement     Problem: Discharge Planning  Goal: Discharge to home or other facility with appropriate resources  Outcome: Progressing  Flowsheets (Taken 10/22/2024 0800 by Sagrario Zaidi RN)  Discharge to home or other facility with appropriate resources: Identify barriers to discharge with patient and caregiver     Problem: Pain  Goal: Verbalizes/displays adequate comfort level or baseline comfort level  Outcome: Progressing     Problem: Safety - Adult  Goal: Free from fall injury  Outcome: Progressing     Problem: Skin/Tissue Integrity  Goal: Absence of new skin breakdown  Description: 1.  Monitor for areas of redness and/or skin breakdown  2.  Assess vascular access sites hourly  3.  Every 4-6 hours minimum:  Change oxygen saturation probe site  4.  Every 4-6 hours:  If on nasal continuous positive airway pressure, respiratory therapy assess nares and determine need for appliance change or resting period.  Outcome: Progressing

## 2024-10-23 NOTE — FLOWSHEET NOTE
10/23/24 0356   Vital Signs   Temp 97.8 °F (36.6 °C)   Temp Source Oral   Pulse 63   Heart Rate Source Monitor   Respirations 18   /64   MAP (Calculated) 81   BP Location Right upper arm   BP Method Automatic   Patient Position Semi fowlers   Pain Assessment   Pain Assessment None - Denies Pain   Pain Level 0   Opioid-Induced Sedation   POSS Score 1   RASS   Hernandez Agitation Sedation Scale (RASS) 0   Oxygen Therapy   SpO2 95 %   O2 Device Nasal cannula   O2 Flow Rate (L/min) 4 L/min     Pt VS as shown above.

## 2024-10-24 LAB
ALBUMIN SERPL-MCNC: 3 G/DL (ref 3.4–5)
ALBUMIN/GLOB SERPL: 0.9 {RATIO} (ref 1.1–2.2)
ALP SERPL-CCNC: 163 U/L (ref 40–129)
ALT SERPL-CCNC: 15 U/L (ref 10–40)
ANION GAP SERPL CALCULATED.3IONS-SCNC: 9 MMOL/L (ref 3–16)
AST SERPL-CCNC: 15 U/L (ref 15–37)
BASOPHILS # BLD: 0 K/UL (ref 0–0.2)
BASOPHILS NFR BLD: 0.7 %
BILIRUB SERPL-MCNC: <0.2 MG/DL (ref 0–1)
BUN SERPL-MCNC: 6 MG/DL (ref 7–20)
CALCIUM SERPL-MCNC: 9.2 MG/DL (ref 8.3–10.6)
CHLORIDE SERPL-SCNC: 98 MMOL/L (ref 99–110)
CO2 SERPL-SCNC: 32 MMOL/L (ref 21–32)
CREAT SERPL-MCNC: 0.7 MG/DL (ref 0.8–1.3)
DEPRECATED RDW RBC AUTO: 15 % (ref 12.4–15.4)
EOSINOPHIL # BLD: 0.3 K/UL (ref 0–0.6)
EOSINOPHIL NFR BLD: 5.7 %
GFR SERPLBLD CREATININE-BSD FMLA CKD-EPI: >90 ML/MIN/{1.73_M2}
GLUCOSE BLD-MCNC: 114 MG/DL (ref 70–99)
GLUCOSE BLD-MCNC: 129 MG/DL (ref 70–99)
GLUCOSE BLD-MCNC: 147 MG/DL (ref 70–99)
GLUCOSE BLD-MCNC: 96 MG/DL (ref 70–99)
GLUCOSE SERPL-MCNC: 157 MG/DL (ref 70–99)
HCT VFR BLD AUTO: 37.5 % (ref 40.5–52.5)
HGB BLD-MCNC: 12.7 G/DL (ref 13.5–17.5)
LYMPHOCYTES # BLD: 1.2 K/UL (ref 1–5.1)
LYMPHOCYTES NFR BLD: 24.2 %
MAGNESIUM SERPL-MCNC: 1.6 MG/DL (ref 1.8–2.4)
MCH RBC QN AUTO: 31.5 PG (ref 26–34)
MCHC RBC AUTO-ENTMCNC: 34 G/DL (ref 31–36)
MCV RBC AUTO: 92.8 FL (ref 80–100)
MONOCYTES # BLD: 0.5 K/UL (ref 0–1.3)
MONOCYTES NFR BLD: 10.8 %
NEUTROPHILS # BLD: 2.8 K/UL (ref 1.7–7.7)
NEUTROPHILS NFR BLD: 58.6 %
PERFORMED ON: ABNORMAL
PERFORMED ON: NORMAL
PLATELET # BLD AUTO: 198 K/UL (ref 135–450)
PMV BLD AUTO: 7.2 FL (ref 5–10.5)
POTASSIUM SERPL-SCNC: 3.4 MMOL/L (ref 3.5–5.1)
PROT SERPL-MCNC: 6.4 G/DL (ref 6.4–8.2)
RBC # BLD AUTO: 4.04 M/UL (ref 4.2–5.9)
SODIUM SERPL-SCNC: 139 MMOL/L (ref 136–145)
WBC # BLD AUTO: 4.8 K/UL (ref 4–11)

## 2024-10-24 PROCEDURE — 6370000000 HC RX 637 (ALT 250 FOR IP): Performed by: SURGERY

## 2024-10-24 PROCEDURE — 85025 COMPLETE CBC W/AUTO DIFF WBC: CPT

## 2024-10-24 PROCEDURE — 97165 OT EVAL LOW COMPLEX 30 MIN: CPT

## 2024-10-24 PROCEDURE — 6360000002 HC RX W HCPCS: Performed by: SURGERY

## 2024-10-24 PROCEDURE — 2580000003 HC RX 258: Performed by: SURGERY

## 2024-10-24 PROCEDURE — 97535 SELF CARE MNGMENT TRAINING: CPT

## 2024-10-24 PROCEDURE — 83735 ASSAY OF MAGNESIUM: CPT

## 2024-10-24 PROCEDURE — 1200000000 HC SEMI PRIVATE

## 2024-10-24 PROCEDURE — 97530 THERAPEUTIC ACTIVITIES: CPT

## 2024-10-24 PROCEDURE — 36415 COLL VENOUS BLD VENIPUNCTURE: CPT

## 2024-10-24 PROCEDURE — 99232 SBSQ HOSP IP/OBS MODERATE 35: CPT | Performed by: INTERNAL MEDICINE

## 2024-10-24 PROCEDURE — 6370000000 HC RX 637 (ALT 250 FOR IP): Performed by: INTERNAL MEDICINE

## 2024-10-24 PROCEDURE — 97161 PT EVAL LOW COMPLEX 20 MIN: CPT

## 2024-10-24 PROCEDURE — 6360000002 HC RX W HCPCS: Performed by: INTERNAL MEDICINE

## 2024-10-24 PROCEDURE — 80053 COMPREHEN METABOLIC PANEL: CPT

## 2024-10-24 PROCEDURE — 94761 N-INVAS EAR/PLS OXIMETRY MLT: CPT

## 2024-10-24 PROCEDURE — 2700000000 HC OXYGEN THERAPY PER DAY

## 2024-10-24 PROCEDURE — 94640 AIRWAY INHALATION TREATMENT: CPT

## 2024-10-24 RX ADMIN — EZETIMIBE 10 MG: 10 TABLET ORAL at 20:26

## 2024-10-24 RX ADMIN — Medication 10 ML: at 20:26

## 2024-10-24 RX ADMIN — FOLIC ACID 1000 MCG: 1 TABLET ORAL at 08:45

## 2024-10-24 RX ADMIN — POTASSIUM CHLORIDE 40 MEQ: 1500 TABLET, EXTENDED RELEASE ORAL at 10:25

## 2024-10-24 RX ADMIN — INSULIN GLARGINE 15 UNITS: 100 INJECTION, SOLUTION SUBCUTANEOUS at 17:01

## 2024-10-24 RX ADMIN — MAGNESIUM SULFATE HEPTAHYDRATE 2000 MG: 40 INJECTION, SOLUTION INTRAVENOUS at 17:31

## 2024-10-24 RX ADMIN — Medication 10 ML: at 08:45

## 2024-10-24 RX ADMIN — GABAPENTIN 800 MG: 400 CAPSULE ORAL at 17:01

## 2024-10-24 RX ADMIN — METOPROLOL SUCCINATE 50 MG: 50 TABLET, EXTENDED RELEASE ORAL at 08:44

## 2024-10-24 RX ADMIN — TAMSULOSIN HYDROCHLORIDE 0.4 MG: 0.4 CAPSULE ORAL at 08:44

## 2024-10-24 RX ADMIN — IPRATROPIUM BROMIDE AND ALBUTEROL SULFATE 1 DOSE: 2.5; .5 SOLUTION RESPIRATORY (INHALATION) at 19:44

## 2024-10-24 RX ADMIN — GABAPENTIN 800 MG: 400 CAPSULE ORAL at 08:44

## 2024-10-24 RX ADMIN — FERROUS SULFATE TAB 325 MG (65 MG ELEMENTAL FE) 325 MG: 325 (65 FE) TAB at 08:45

## 2024-10-24 RX ADMIN — CEFEPIME 2000 MG: 2 INJECTION, POWDER, FOR SOLUTION INTRAVENOUS at 11:59

## 2024-10-24 RX ADMIN — GABAPENTIN 800 MG: 400 CAPSULE ORAL at 20:26

## 2024-10-24 RX ADMIN — CEFEPIME 2000 MG: 2 INJECTION, POWDER, FOR SOLUTION INTRAVENOUS at 23:27

## 2024-10-24 RX ADMIN — IPRATROPIUM BROMIDE AND ALBUTEROL SULFATE 1 DOSE: 2.5; .5 SOLUTION RESPIRATORY (INHALATION) at 07:38

## 2024-10-24 RX ADMIN — VANCOMYCIN HYDROCHLORIDE 1250 MG: 1.25 INJECTION, SOLUTION INTRAVITREAL at 02:56

## 2024-10-24 RX ADMIN — ALLOPURINOL 300 MG: 300 TABLET ORAL at 08:45

## 2024-10-24 RX ADMIN — DULOXETINE HYDROCHLORIDE 60 MG: 60 CAPSULE, DELAYED RELEASE ORAL at 08:44

## 2024-10-24 RX ADMIN — GABAPENTIN 800 MG: 400 CAPSULE ORAL at 13:09

## 2024-10-24 RX ADMIN — POLYETHYLENE GLYCOL (3350) 17 G: 17 POWDER, FOR SOLUTION ORAL at 17:45

## 2024-10-24 RX ADMIN — ENOXAPARIN SODIUM 40 MG: 100 INJECTION SUBCUTANEOUS at 08:45

## 2024-10-24 RX ADMIN — ATORVASTATIN CALCIUM 40 MG: 40 TABLET, FILM COATED ORAL at 08:45

## 2024-10-24 RX ADMIN — DILTIAZEM HYDROCHLORIDE 180 MG: 180 CAPSULE, EXTENDED RELEASE ORAL at 08:44

## 2024-10-24 RX ADMIN — VANCOMYCIN HYDROCHLORIDE 1250 MG: 1.25 INJECTION, SOLUTION INTRAVITREAL at 15:58

## 2024-10-24 RX ADMIN — FUROSEMIDE 20 MG: 20 TABLET ORAL at 08:44

## 2024-10-24 RX ADMIN — CHOLECALCIFEROL TAB 125 MCG (5000 UNIT) 5000 UNITS: 125 TAB at 08:45

## 2024-10-24 RX ADMIN — PANTOPRAZOLE SODIUM 40 MG: 40 TABLET, DELAYED RELEASE ORAL at 05:49

## 2024-10-24 NOTE — PLAN OF CARE
Problem: Chronic Conditions and Co-morbidities  Goal: Patient's chronic conditions and co-morbidity symptoms are monitored and maintained or improved  10/23/2024 2304 by Liliam Torres RN  Outcome: Progressing  10/23/2024 1503 by Nohemi Esparza RN  Outcome: Progressing     Problem: Discharge Planning  Goal: Discharge to home or other facility with appropriate resources  10/23/2024 2304 by Liliam Torres RN  Outcome: Progressing  10/23/2024 1503 by Nohemi Esparza RN  Outcome: Progressing     Problem: Pain  Goal: Verbalizes/displays adequate comfort level or baseline comfort level  10/23/2024 2304 by Liliam Torres RN  Outcome: Progressing  10/23/2024 1503 by Nohemi Esparza RN  Outcome: Progressing     Problem: Safety - Adult  Goal: Free from fall injury  10/23/2024 2304 by Liliam Torres RN  Outcome: Progressing  10/23/2024 1503 by Nohemi Esparza RN  Outcome: Progressing     Problem: Skin/Tissue Integrity  Goal: Absence of new skin breakdown  Description: 1.  Monitor for areas of redness and/or skin breakdown  2.  Assess vascular access sites hourly  3.  Every 4-6 hours minimum:  Change oxygen saturation probe site  4.  Every 4-6 hours:  If on nasal continuous positive airway pressure, respiratory therapy assess nares and determine need for appliance change or resting period.  10/23/2024 2304 by Liliam Torres RN  Outcome: Progressing  10/23/2024 1503 by Nohemi Esparza RN  Outcome: Progressing

## 2024-10-24 NOTE — PLAN OF CARE
Problem: Chronic Conditions and Co-morbidities  Goal: Patient's chronic conditions and co-morbidity symptoms are monitored and maintained or improved  10/24/2024 0948 by Lisa Paulson RN  Outcome: Progressing     Problem: Discharge Planning  Goal: Discharge to home or other facility with appropriate resources  10/24/2024 0948 by Lisa Paulson RN  Outcome: Progressing     Problem: Pain  Goal: Verbalizes/displays adequate comfort level or baseline comfort level  10/24/2024 0948 by Lisa Paulson RN  Outcome: Progressing     Problem: Safety - Adult  Goal: Free from fall injury  10/24/2024 0948 by Lisa Pualson RN  Outcome: Progressing     Problem: Skin/Tissue Integrity  Goal: Absence of new skin breakdown  Description: 1.  Monitor for areas of redness and/or skin breakdown  2.  Assess vascular access sites hourly  3.  Every 4-6 hours minimum:  Change oxygen saturation probe site  4.  Every 4-6 hours:  If on nasal continuous positive airway pressure, respiratory therapy assess nares and determine need for appliance change or resting period.  10/24/2024 0948 by Lisa Paulson RN  Outcome: Progressing

## 2024-10-25 VITALS
DIASTOLIC BLOOD PRESSURE: 68 MMHG | HEART RATE: 79 BPM | SYSTOLIC BLOOD PRESSURE: 131 MMHG | OXYGEN SATURATION: 98 % | RESPIRATION RATE: 18 BRPM | WEIGHT: 199.9 LBS | HEIGHT: 67 IN | BODY MASS INDEX: 31.37 KG/M2 | TEMPERATURE: 97.6 F

## 2024-10-25 LAB
BACTERIA SPEC AEROBE CULT: ABNORMAL
BACTERIA SPEC ANAEROBE CULT: ABNORMAL
GLUCOSE BLD-MCNC: 125 MG/DL (ref 70–99)
GLUCOSE BLD-MCNC: 162 MG/DL (ref 70–99)
GRAM STN SPEC: ABNORMAL
ORGANISM: ABNORMAL
PERFORMED ON: ABNORMAL
PERFORMED ON: ABNORMAL

## 2024-10-25 PROCEDURE — 6360000002 HC RX W HCPCS: Performed by: SURGERY

## 2024-10-25 PROCEDURE — 2580000003 HC RX 258: Performed by: SURGERY

## 2024-10-25 PROCEDURE — 6370000000 HC RX 637 (ALT 250 FOR IP): Performed by: SURGERY

## 2024-10-25 PROCEDURE — 6370000000 HC RX 637 (ALT 250 FOR IP): Performed by: INTERNAL MEDICINE

## 2024-10-25 PROCEDURE — 2700000000 HC OXYGEN THERAPY PER DAY

## 2024-10-25 PROCEDURE — 6360000002 HC RX W HCPCS: Performed by: INTERNAL MEDICINE

## 2024-10-25 PROCEDURE — 94640 AIRWAY INHALATION TREATMENT: CPT

## 2024-10-25 PROCEDURE — 94761 N-INVAS EAR/PLS OXIMETRY MLT: CPT

## 2024-10-25 RX ORDER — LACTOBACILLUS RHAMNOSUS GG 10B CELL
1 CAPSULE ORAL
DISCHARGE
Start: 2024-10-26 | End: 2024-11-10

## 2024-10-25 RX ORDER — INSULIN GLARGINE 100 [IU]/ML
15 INJECTION, SOLUTION SUBCUTANEOUS NIGHTLY
DISCHARGE
Start: 2024-10-25

## 2024-10-25 RX ORDER — LACTOBACILLUS RHAMNOSUS GG 10B CELL
1 CAPSULE ORAL
Status: DISCONTINUED | OUTPATIENT
Start: 2024-10-26 | End: 2024-10-25 | Stop reason: HOSPADM

## 2024-10-25 RX ORDER — CALCIUM CARBONATE 500 MG/1
500 TABLET, CHEWABLE ORAL 3 TIMES DAILY PRN
DISCHARGE
Start: 2024-10-25

## 2024-10-25 RX ORDER — OXYCODONE HYDROCHLORIDE 10 MG/1
10 TABLET ORAL 4 TIMES DAILY
Qty: 20 TABLET | Refills: 0 | Status: SHIPPED | OUTPATIENT
Start: 2024-10-25 | End: 2024-10-30

## 2024-10-25 RX ORDER — TAMSULOSIN HYDROCHLORIDE 0.4 MG/1
0.4 CAPSULE ORAL DAILY
DISCHARGE
Start: 2024-10-25

## 2024-10-25 RX ADMIN — FOLIC ACID 1000 MCG: 1 TABLET ORAL at 08:42

## 2024-10-25 RX ADMIN — DULOXETINE HYDROCHLORIDE 60 MG: 60 CAPSULE, DELAYED RELEASE ORAL at 08:42

## 2024-10-25 RX ADMIN — CEFEPIME 2000 MG: 2 INJECTION, POWDER, FOR SOLUTION INTRAVENOUS at 10:39

## 2024-10-25 RX ADMIN — DILTIAZEM HYDROCHLORIDE 180 MG: 180 CAPSULE, EXTENDED RELEASE ORAL at 08:42

## 2024-10-25 RX ADMIN — VANCOMYCIN HYDROCHLORIDE 1250 MG: 1.25 INJECTION, SOLUTION INTRAVITREAL at 04:07

## 2024-10-25 RX ADMIN — METOPROLOL SUCCINATE 50 MG: 50 TABLET, EXTENDED RELEASE ORAL at 08:43

## 2024-10-25 RX ADMIN — ALLOPURINOL 300 MG: 300 TABLET ORAL at 08:42

## 2024-10-25 RX ADMIN — Medication 10 ML: at 08:46

## 2024-10-25 RX ADMIN — FUROSEMIDE 20 MG: 20 TABLET ORAL at 08:42

## 2024-10-25 RX ADMIN — CHOLECALCIFEROL TAB 125 MCG (5000 UNIT) 5000 UNITS: 125 TAB at 08:42

## 2024-10-25 RX ADMIN — GABAPENTIN 800 MG: 400 CAPSULE ORAL at 12:27

## 2024-10-25 RX ADMIN — TAMSULOSIN HYDROCHLORIDE 0.4 MG: 0.4 CAPSULE ORAL at 08:42

## 2024-10-25 RX ADMIN — GABAPENTIN 800 MG: 400 CAPSULE ORAL at 08:42

## 2024-10-25 RX ADMIN — FERROUS SULFATE TAB 325 MG (65 MG ELEMENTAL FE) 325 MG: 325 (65 FE) TAB at 08:42

## 2024-10-25 RX ADMIN — IPRATROPIUM BROMIDE AND ALBUTEROL SULFATE 1 DOSE: 2.5; .5 SOLUTION RESPIRATORY (INHALATION) at 07:48

## 2024-10-25 RX ADMIN — ATORVASTATIN CALCIUM 40 MG: 40 TABLET, FILM COATED ORAL at 08:42

## 2024-10-25 RX ADMIN — PANTOPRAZOLE SODIUM 40 MG: 40 TABLET, DELAYED RELEASE ORAL at 05:41

## 2024-10-25 RX ADMIN — ENOXAPARIN SODIUM 40 MG: 100 INJECTION SUBCUTANEOUS at 08:41

## 2024-10-25 ASSESSMENT — PAIN SCALES - GENERAL: PAINLEVEL_OUTOF10: 7

## 2024-10-25 NOTE — FLOWSHEET NOTE
10/25/24 0207   Vital Signs   Temp 97.8 °F (36.6 °C)   Temp Source Oral   Pulse 77   Respirations 18   BP (!) 154/71   MAP (Calculated) 99   BP Location Right upper arm   BP Method Automatic   Patient Position Semi fowlers   Oxygen Therapy   SpO2 95 %   O2 Device Nasal cannula   O2 Flow Rate (L/min) 4 L/min   Height and Weight   Weight - Scale 90.7 kg (199 lb 14.4 oz)   Weight Method Standing scale   BMI (Calculated) 31.4

## 2024-10-25 NOTE — DISCHARGE SUMMARY
Name:  Sandor Early  Room:  0220/0220-02  MRN:    [id number]    Discharge Summary      This discharge summary is in conjunction with a complete physical exam done on the day of discharge.    Attending Physician: Dr. Hand  Discharging Physician: Dr. Hand      Admit: 10/21/2024  Discharge:  10/25/2024    HPI:  69 y.o. male with history of chronic respiratory failure on 4 L of oxygen via nasal cannula at baseline who presents to the ER complaining of scrotal pain and swelling.  Workup in the ER including CT scan of the abdomen/pelvis showing: Tubular fluid collection seen in right inferior scrotal region, suspicious for abscess in the appropriate clinical scenario.   Lobular contour to the liver is seen suggesting underlying cirrhosis.  Patient denies chest pain, shortness of breath, fever or chills.      Diagnoses this Admission and Hospital Course     #Perineal abscess  -General surgery consulted  PERINEAL INCISION AND DRAINAGE on 10/22.    POD #3  -IV Antibiotics; Cefepime, Vanc D#3.  Will switch to oral antibiotics Augmentin on discharge  -pain control: scheduled Oxy-IR     Continue dressing changes and pain control   plan is to discharge to SNF   case management has made referral     #Hypokalemia  #Hypomagnesemia  -replaced.  Monitor labs     #Chronic hypoxic respiratory failure  #COPD  -stable on home O2 settings: 4 L.   -continue Duonebs.      #DM type 2  -monitor glucose. Continue Lantus 15 units daily.      #Chronic diastolic CHF  -stable. No s/s decompensation  -continue Lasix.      #Hypertension  -continue Diltiazem, Toprol-XL     #GERD  -on PPI.      #Diabetic neuropathy   -continue Gabapentin     #Iron deficiency anemia  -on ferrous sulfate     #Hyperlipidemia  -on Atorvastatin, Zetia     #Gout  -stable. No Acute flare  -on Allopurinol.      #Cirrhosis  -secondary to [place], alcohol      #PAD s.p aortofemoral bypass  -on statin   -had normal arterial duplexes in 2020     #H/o bladder cancer      #Chronic

## 2024-10-25 NOTE — PLAN OF CARE
Problem: Chronic Conditions and Co-morbidities  Goal: Patient's chronic conditions and co-morbidity symptoms are monitored and maintained or improved  Outcome: Progressing  Flowsheets (Taken 10/24/2024 2008)  Care Plan - Patient's Chronic Conditions and Co-Morbidity Symptoms are Monitored and Maintained or Improved: Monitor and assess patient's chronic conditions and comorbid symptoms for stability, deterioration, or improvement     Problem: Discharge Planning  Goal: Discharge to home or other facility with appropriate resources  Outcome: Progressing  Flowsheets (Taken 10/24/2024 2008)  Discharge to home or other facility with appropriate resources: Identify barriers to discharge with patient and caregiver     Problem: Pain  Goal: Verbalizes/displays adequate comfort level or baseline comfort level  Outcome: Progressing  Flowsheets (Taken 10/24/2024 2008)  Verbalizes/displays adequate comfort level or baseline comfort level: Encourage patient to monitor pain and request assistance     Problem: Safety - Adult  Goal: Free from fall injury  Outcome: Progressing     Problem: Skin/Tissue Integrity  Goal: Absence of new skin breakdown  Description: 1.  Monitor for areas of redness and/or skin breakdown  2.  Assess vascular access sites hourly  3.  Every 4-6 hours minimum:  Change oxygen saturation probe site  4.  Every 4-6 hours:  If on nasal continuous positive airway pressure, respiratory therapy assess nares and determine need for appliance change or resting period.  Outcome: Progressing

## 2024-10-25 NOTE — PLAN OF CARE
Problem: Chronic Conditions and Co-morbidities  Goal: Patient's chronic conditions and co-morbidity symptoms are monitored and maintained or improved  10/25/2024 1238 by Lisa Paulson RN  Outcome: Adequate for Discharge     Problem: Discharge Planning  Goal: Discharge to home or other facility with appropriate resources  10/25/2024 1238 by Lisa Paulson RN  Outcome: Adequate for Discharge     Problem: Pain  Goal: Verbalizes/displays adequate comfort level or baseline comfort level  10/25/2024 1238 by Lisa Paulson RN  Outcome: Adequate for Discharge  Flowsheets (Taken 10/25/2024 0330 by Christi Sotomayor RN)  Verbalizes/displays adequate comfort level or baseline comfort level: Encourage patient to monitor pain and request assistance     Problem: Safety - Adult  Goal: Free from fall injury  10/25/2024 1238 by Lisa Paulson RN  Outcome: Adequate for Discharge     Problem: Skin/Tissue Integrity  Goal: Absence of new skin breakdown  Description: 1.  Monitor for areas of redness and/or skin breakdown  2.  Assess vascular access sites hourly  3.  Every 4-6 hours minimum:  Change oxygen saturation probe site  4.  Every 4-6 hours:  If on nasal continuous positive airway pressure, respiratory therapy assess nares and determine need for appliance change or resting period.  10/25/2024 1238 by Lisa Paulson RN  Outcome: Adequate for Discharge

## 2024-10-25 NOTE — FLOWSHEET NOTE
10/24/24 2008   Vital Signs   Temp 97.9 °F (36.6 °C)   Temp Source Oral   Pulse 82   Heart Rate Source Monitor   Respirations 18   BP (!) 138/57   MAP (Calculated) 84   BP Location Right upper arm   BP Method Automatic   Patient Position Semi corwinwlers   Pain Assessment   Pain Assessment None - Denies Pain   Care Plan - Pain Goals   Verbalizes/displays adequate comfort level or baseline comfort level Encourage patient to monitor pain and request assistance   Opioid-Induced Sedation   POSS Score 1   RASS   Hernandez Agitation Sedation Scale (RASS) 0   Oxygen Therapy   SpO2 97 %   Pulse Oximetry Type Intermittent   Pulse Oximeter Device Mode Intermittent   Pulse Oximeter Device Location Left;Finger   O2 Device Nasal cannula   O2 Flow Rate (L/min) 4 L/min   Oxygen Therapy Supplemental oxygen     Pt resting comfortably in bed. Bed alarm on, call light within reach. No needs expressed at this time. Will continue to monitor.

## 2024-10-25 NOTE — CARE COORDINATION
Patient noted to have a discharge order.  Pt has been medically cleared for transition to [facility]illed [facility]    Patient discharged to   [facility]  [id number]  [place] [id number]  Phone: [phone]  Fax: [phone]        HENS Completed:  Y  Pre-cert required/obtained:  Y    Transportation scheduled for 1400  Transportation provided by [facility]   AVS faxed and agency notified:  Y, wound care orders in [place]    Last IMM:  10/25/24 CM delivered second IMM to patient. Verbal explanation provided of 4 hours to review notice. Patient voiced understanding and is agreeable to discharge.     Family Notified:  patient notified and stated he will call family     Nurse to call report to facility  [phone]

## 2024-10-25 NOTE — PROGRESS NOTES
10/22/24 0904   RT Protocol   History Pulmonary Disease 2   Respiratory pattern 0   Breath sounds 4   Cough 0   Bronchodilator Assessment Score 6       
  Pharmacy Vancomycin Consult     Vancomycin Day: 3/7  Current Dosinmg q12h  Current indication: SSTI for 7 days    Temp max:      Recent Labs     10/22/24  0636 10/23/24  0615 10/23/24  0616   BUN 7 6*  --    CREATININE 0.6* 0.7*  --    WBC 8.0  --  5.1       Intake/Output Summary (Last 24 hours) at 10/23/2024 1426  Last data filed at 10/23/2024 0356  Gross per 24 hour   Intake 540 ml   Output 1250 ml   Net -710 ml     Culture Date      Source                       Results  10/22                   Wound                       NGTD    Ht Readings from Last 1 Encounters:   10/21/24 1.702 m (5' 7\")        Wt Readings from Last 1 Encounters:   10/21/24 90.2 kg (198 lb 12.8 oz)       Body mass index is 31.14 kg/m².    Estimated Creatinine Clearance: 108 mL/min (A) (based on SCr of 0.7 mg/dL (L)).    Level:  15.9 ()    Assessment/Plan:  Continue with same rX  Sabiha Cid Pharm D 10/23/92321:28 PM  .        
 Patient admitted to room 220 from [facility] ER. Side rails up x2. Patient does have an order for telemetry. Bed is locked and in lowest position. Call light placed in patient reach. Patient explained the routine of the hospital, including but not limited to lab work, vital signs, hourly rounding, etc. Care plans and education updated, CHG wipes done at time of admission.     BP (!) 167/70   Pulse 96   Temp 98.1 °F (36.7 °C) (Oral)   Resp 20   Ht 1.702 m (5' 7\")   Wt 90.2 kg (198 lb 12.8 oz)   SpO2 96%   BMI 31.14 kg/m²     Most recent set of vitals as shown.     Patient has an LDA that does not require CHG wipes, including possible a surgery incision, chavira catheter, or a central line.     4 Eyes Skin Assessment     The patient is being assess for   Admission    I agree that 2 RN's have performed a thorough Head to Toe Skin Assessment on the patient. ALL assessment sites listed below have been assessed.      Redness and swelling to groin. Scattered ecchymosis. No other skin issues.  Areas assessed for pressure by both nurses:   [x]   Head, Face, and Ears   [x]   Shoulders, Back, and Chest, Abdomen  [x]   Arms, Elbows, and Hands   [x]   Coccyx, Sacrum, and Ischium  [x]   Legs, Feet, and Heels        Skin Assessed Under all Medical Devices by both nurses:  NA               All Mepilex Borders were peeled back and area peeked at by both nurses:  No: NA  Please list where Mepilex Borders are located:  NA             **SHARE this note so that the co-signing nurse is able to place an eSignature**    Co-signer eSignature: Electronically signed by Anthony Drummond RN on 10/22/24 at 7:25 AM EDT    Does the Patient have Skin Breakdown related to pressure?  No            Abraham Prevention initiated:  NA   Wound Care Orders initiated:  SAL      [facility] nurse consulted for Pressure Injury (Stage 3,4, Unstageable, DTI, NWPT, Complex wounds)and New or Established Ostomies:  NA      Primary Nurse eSignature: Electronically signed by 
/66   Pulse 83   Temp 98.6 °F (37 °C) (Oral)   Resp 16   Ht 1.702 m (5' 7\")   Wt 90.2 kg (198 lb 12.8 oz)   SpO2 94%   BMI 31.14 kg/m²     Pt awake in bed. Pt alert and orientedX4. Pt states a pain level of 5 on a 0-10 pain scale. Pt given his scheduled Oxycodone. Assessment complete. Meds passed. Pt denies needs at this time.        Bedside Mobility Assessment Tool (BMAT):     Assessment Level 1- Sit and Shake    1. From a semi-reclined position, ask patient to sit up and rotate to a seated position at the side of the bed. Can use the bedrail.    2. Ask patient to reach out and grab your hand and shake making sure patient reaches across his/her midline.   Pass- Patient is able to come to a seated position, maintain core strength. Maintains seated balance while reaching across midline. Move on to Assessment Level 2.     Assessment Level 2- Stretch and Point   1. With patient in seated position at the side of the bed, have patient place both feet on the floor (or stool) with knees no higher than hips.    2. Ask patient to stretch one leg and straighten the knee, then bend the ankle/flex and point the toes. If appropriate, repeat with the other leg.   Pass- Patient is able to demonstrate appropriate quad strength on intended weight bearing limb(s). Move onto Assessment Level 3.     Assessment Level 3- Stand   1. Ask patient to elevate off the bed or chair (seated to standing) using an assistive device (cane, bedrail).    2. Patient should be able to raise buttocks off be and hold for a count of five. May repeat once.   Pass- Patient maintains standing stability for at least 5 seconds, proceed to assessment level 4.    Assessment Level 4- Walk   1. Ask patient to march in place at bedside.    2. Then ask patient to advance step and return each foot. Some medical conditions may render a patient from stepping backwards, use your best clinical judgement.   Fail- Patient not able to complete tasks OR requires 
4 Eyes Skin Assessment     NAME:  Sandor Early  YOB: 1955  MEDICAL RECORD NUMBER:  [id number]    The patient is being assessed for  Transfer to New Unit    I agree that at least one RN has performed a thorough Head to Toe Skin Assessment on the patient. ALL assessment sites listed below have been assessed.      Areas assessed by both nurses: scrotal abscess surgical wound r/t I&D,scattered bruising    Head, Face, Ears, Shoulders, Back, Chest, Arms, Elbows, Hands, Sacrum. Buttock, Coccyx, Ischium, Legs. Feet and Heels, and Under Medical Devices         Does the Patient have a Wound? Yes wound(s) were present on assessment. LDA wound assessment was Initiated and completed by RN       Abraham Prevention initiated by RN: No  Wound Care Orders initiated by RN: No    Pressure Injury (Stage 3,4, Unstageable, DTI, NWPT, and Complex wounds) if present, place Wound referral order by RN under : No    New Ostomies, if present place, Ostomy referral order under : No     Nurse 1 eSignature: Electronically signed by Lisa Paulson RN on 10/25/24 at 1:59 PM EDT    **SHARE this note so that the co-signing nurse can place an eSignature**    Nurse 2 eSignature: Electronically signed by Niki Wyatt RN on 10/25/24 at 2:01 PM EDT   
Admission orders place.  Admit to med-surg, tele  Scrotal abscess    Nevaeh Velazquez FNP-C  10/21/2024    
Bedside Mobility Assessment Tool (BMAT):     Assessment Level 1- Sit and Shake    1. From a semi-reclined position, ask patient to sit up and rotate to a seated position at the side of the bed. Can use the bedrail.    2. Ask patient to reach out and grab your hand and shake making sure patient reaches across his/her midline.   Pass- Patient is able to come to a seated position, maintain core strength. Maintains seated balance while reaching across midline. Move on to Assessment Level 2.     Assessment Level 2- Stretch and Point   1. With patient in seated position at the side of the bed, have patient place both feet on the floor (or stool) with knees no higher than hips.    2. Ask patient to stretch one leg and straighten the knee, then bend the ankle/flex and point the toes. If appropriate, repeat with the other leg.   Pass- Patient is able to demonstrate appropriate quad strength on intended weight bearing limb(s). Move onto Assessment Level 3.     Assessment Level 3- Stand   1. Ask patient to elevate off the bed or chair (seated to standing) using an assistive device (cane, bedrail).    2. Patient should be able to raise buttocks off be and hold for a count of five. May repeat once.   Pass- Patient maintains standing stability for at least 5 seconds, proceed to assessment level 4.    Assessment Level 4- Walk   1. Ask patient to march in place at bedside.    2. Then ask patient to advance step and return each foot. Some medical conditions may render a patient from stepping backwards, use your best clinical judgement.   Pass- Patient demonstrates balance while shifting weight and ability to step, takes independent steps, does not use assistive device patient is MOBILITY LEVEL 4.      Mobility Level- 4   
CMU called, -pt visible on telemetry monitor  
Consent signed and placed in chart.  
Denies needing pain med.  Packing intact to ardha scrotal area. Sat on 4 LNC 96-94%.  
Down via bed to PACU in stable condition on 4 liters per nasal cannula.  
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     Weights Reviewed: Yes   Admission weight: 99.8 kg (220 lb)   Wt Readings from Last 3 Encounters:   10/21/24 90.2 kg (198 lb 12.8 oz)   06/24/24 92.5 kg (204 lb)   05/21/24 108.9 kg (240 lb)     Intake & Output Reviewed: Yes     Intake/Output Summary (Last 24 hours) at 10/22/2024 1802  Last data filed at 10/22/2024 1431  Gross per 24 hour   Intake 540 ml   Output 2050 ml   Net -1510 ml       ECHOCARDIOGRAM Reviewed: Yes   Patient's Ejection Fraction (EF) is greater than 40%     Medications Reviewed: Yes   SCHEDULED HOSPITAL MEDICATIONS:   allopurinol  300 mg Oral Daily    atorvastatin  40 mg Oral Daily    vitamin D3  5,000 Units Oral Daily    dilTIAZem  180 mg Oral Daily    DULoxetine  60 mg Oral Daily    ezetimibe  10 mg Oral Nightly    ferrous sulfate  325 mg Oral Daily with breakfast    folic acid  1,000 mcg Oral Daily    furosemide  20 mg Oral Daily    gabapentin  800 mg Oral 4x Daily    metoprolol succinate  50 mg Oral Daily    oxyCODONE HCl  10 mg Oral 4x daily    pantoprazole  40 mg Oral QAM AC    tamsulosin  0.4 mg Oral Daily    sodium chloride flush  5-40 mL IntraVENous 2 times per day    enoxaparin  40 mg SubCUTAneous Daily    cefepime  2,000 mg IntraVENous Q12H    vancomycin  1,250 mg IntraVENous Q12H    ipratropium 0.5 
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     Weights Reviewed: Yes   Admission weight: 99.8 kg (220 lb)   Wt Readings from Last 3 Encounters:   10/21/24 90.2 kg (198 lb 12.8 oz)   06/24/24 92.5 kg (204 lb)   05/21/24 108.9 kg (240 lb)     Intake & Output Reviewed: Yes     Intake/Output Summary (Last 24 hours) at 10/22/2024 2224  Last data filed at 10/22/2024 1955  Gross per 24 hour   Intake 540 ml   Output 2750 ml   Net -2210 ml       ECHOCARDIOGRAM Reviewed: Yes   Patient's Ejection Fraction (EF) is greater than 40%     Medications Reviewed: Yes   SCHEDULED HOSPITAL MEDICATIONS:   allopurinol  300 mg Oral Daily    atorvastatin  40 mg Oral Daily    vitamin D3  5,000 Units Oral Daily    dilTIAZem  180 mg Oral Daily    DULoxetine  60 mg Oral Daily    ezetimibe  10 mg Oral Nightly    ferrous sulfate  325 mg Oral Daily with breakfast    folic acid  1,000 mcg Oral Daily    furosemide  20 mg Oral Daily    gabapentin  800 mg Oral 4x Daily    metoprolol succinate  50 mg Oral Daily    oxyCODONE HCl  10 mg Oral 4x daily    pantoprazole  40 mg Oral QAM AC    tamsulosin  0.4 mg Oral Daily    sodium chloride flush  5-40 mL IntraVENous 2 times per day    enoxaparin  40 mg SubCUTAneous Daily    cefepime  2,000 mg IntraVENous Q12H    vancomycin  1,250 mg IntraVENous Q12H    ipratropium 0.5 
Inpatient Physical Therapy Evaluation & Treatment    Unit: [facility]  Date:  10/24/2024  Patient Name:    Sandor Early  Admitting diagnosis:  Scrotal abscess [N49.2]  Perineal abscess [L02.215]  Admit Date:  10/21/2024  Precautions/Restrictions/WB Status/ Lines/ Wounds/ Oxygen: Fall risk, Bed/chair alarm, Lines (IV and Supplemental O2 (4L)), Telemetry, and Isolation Precautions: Contact      Pt seen for cotreatment this date due to patient safety, patient endurance, and limited functional status information    Treatment Time:  0845 - 0920  Treatment Number:  1   Timed Code Treatment Minutes: 25 minutes  Total Treatment Minutes:  35  minutes    Patient Stated Goals for Therapy: none stated          Discharge Recommendations: Home with PRN assistance, will need SNF for managing wound care  DME needs for discharge: Needs Met       Therapy recommendation for EMS Transport: can transport by wheelchair    Therapy recommendations for staff:   Stand by assist for ambulation with use of No AD and gait belt to/from chair  to/from bathroom  within room    History of Present Illness:   H&P per Dr. Vega 10/21/24  \"69 y.o. male with history of chronic respiratory failure on 4 L of oxygen via nasal cannula at baseline who presents to the ER complaining of scrotal pain and swelling.  Workup in the ER including CT scan of the abdomen/pelvis showing: Tubular fluid collection seen in right inferior scrotal region, suspicious for abscess in the appropriate clinical scenario.   Lobular contour to the liver is seen suggesting underlying cirrhosis.  Patient denies chest pain, shortness of breath, fever or chills. \"    Preadmission Environment:   Pt lives with    with family (step son, DIL, and 3 grandkids)  Home environment:    mobile home/trailer  Steps to enter first floor:   3 steps to enter and hand rail bilateral  Steps to second floor/basement: N/A  Laundry:     Pt completes  Bathroom:     tub/shower unit and comfort height 
Less coughing noted, transferred to nasal cannula at 5 liters  
PT/OT here to see pt.  
Patient ready for surgery.Thi Yeboah RN    
Progress Note    Admit Date:  10/21/2024    69 year old male with hypertension, seizures, h/o bladder cancer, iron deficiency anemia, gout, DM type 2, COPD presents with c/o scrotal abscess.     Subjective:  Mr. Early states that area is pretty sore.   Going to OR this afternoon for I&D.     Objective:   Patient Vitals for the past 4 hrs:   BP Temp Temp src Pulse Resp SpO2   10/22/24 0715 (!) 152/75 98.4 °F (36.9 °C) Oral 88 19 99 %          Intake/Output Summary (Last 24 hours) at 10/22/2024 0856  Last data filed at 10/22/2024 0819  Gross per 24 hour   Intake 490 ml   Output 1550 ml   Net -1060 ml       Physical Exam:  Gen: No distress. Alert. Chronically ill appearing elderly male.   Eyes: PERRL. No sclera icterus. No conjunctival injection.   ENT: No discharge. Pharynx clear.   Neck: Trachea midline.  Resp: No accessory muscle use. No crackles. No wheezes. No rhonchi.   CV: Regular rate. Regular rhythm. No murmur. No rub. No edema.   Capillary Refill: Brisk,< 3 seconds   Peripheral Pulses: +2 palpable, equal bilaterally   GI: Non-tender. Non-distended. Normal bowel sounds. No hernia.   : perineal abscess, swelling, erythema. some drainage. chaperone present.  Skin: Warm and dry. No nodule on exposed extremities. No rash on exposed extremities.   M/S: No cyanosis. No joint deformity. No clubbing.   Neuro: Awake. Grossly nonfocal    Psych: Oriented x 3. No anxiety or agitation      Data:  CBC:   Recent Labs     10/21/24  1402 10/22/24  0636   WBC 10.2 8.0   HGB 13.1* 12.9*   HCT 39.6* 37.8*   MCV 93.2 92.7    196     BMP:   Recent Labs     10/21/24  1402 10/22/24  0636    138   K 3.1* 3.6   CL 97* 100   CO2 33* 29   BUN 10 7   CREATININE 0.7* 0.6*     LIVER PROFILE:   Recent Labs     10/21/24  1402 10/22/24  0636   AST 23 20   ALT 24 23   BILITOT 0.3 0.4   ALKPHOS 202* 183*     PT/INR: No results for input(s): \"PROTIME\", \"INR\" in the last 72 hours.    CULTURES  Results       Procedure Component 
Progress Note    Admit Date:  10/21/2024    69 year old male with hypertension, seizures, h/o bladder cancer, iron deficiency anemia, gout, DM type 2, COPD presents with c/o scrotal/ perineal abscess.      Seen by gen surgeon     S/p   PERINEAL INCISION AND DRAINAGE--> by surgeon on 10/22.   POD #1       Subjective:  Mr. Early is stable, better.   Periarea is  sore.  Meds help    Dressing changes done this am .   No fever           Objective:       Vitals:    10/23/24 0018 10/23/24 0356 10/23/24 0743 10/23/24 0829   BP: 126/76 114/64  (!) 113/59   Pulse: 68 63  75   Resp: 16 18  16   Temp: 97.8 °F (36.6 °C) 97.8 °F (36.6 °C)  98.2 °F (36.8 °C)   TempSrc: Oral Oral  Oral   SpO2: 95% 95% 95% 96%   Weight:       Height:              Intake/Output Summary (Last 24 hours) at 10/23/2024 1515  Last data filed at 10/23/2024 1508  Gross per 24 hour   Intake 1427.69 ml   Output 1250 ml   Net 177.69 ml       Physical Exam:  Gen: No distress. Alert. Chronically ill appearing elderly male.   Eyes: PERRL. No sclera icterus. No conjunctival injection.   ENT: No discharge. Pharynx clear.   Neck: Trachea midline.  Resp: No accessory muscle use. No crackles. No wheezes. No rhonchi.   CV: Regular rate. Regular rhythm. No murmur. No rub. No edema.   Capillary Refill: Brisk,< 3 seconds   Peripheral Pulses: +2 palpable, equal bilaterally   GI: Non-tender. Non-distended. Normal bowel sounds. No hernia.   :  dressing +; exam defered. Please see surgery note   Skin: Warm and dry. No nodule on exposed extremities. No rash on exposed extremities.   M/S: No cyanosis. No joint deformity. No clubbing.   Neuro: Awake. Grossly nonfocal    Psych: Oriented x 3. No anxiety or agitation      Data:  CBC:   Recent Labs     10/21/24  1402 10/22/24  0636 10/23/24  0616   WBC 10.2 8.0 5.1   HGB 13.1* 12.9* 12.6*   HCT 39.6* 37.8* 37.5*   MCV 93.2 92.7 92.7    196 193     BMP:   Recent Labs     10/21/24  1402 10/22/24  0636 10/23/24  0615    
Pt c/o shortness or breath, Dr Camacho notified, orders recieved  
Pt given PRN Tums for heartburn.  
RT Inhaler-Nebulizer Bronchodilator Protocol Note    There is a bronchodilator order in the chart from a provider indicating to follow the RT Bronchodilator Protocol and there is an “Initiate RT Inhaler-Nebulizer Bronchodilator Protocol” order as well (see protocol at bottom of note).    CXR Findings:  No results found.    The findings from the last RT Protocol Assessment were as follows:   History Pulmonary Disease: (P) Chronic pulmonary disease  Respiratory Pattern: (P) Dyspnea on exertion or RR 21-25 bpm  Breath Sounds: (P) Intermittent or unilateral wheezes  Cough: (P) Strong, spontaneous, non-productive  Indication for Bronchodilator Therapy: (P) Decreased or absent breath sounds  Bronchodilator Assessment Score: (P) 8    Aerosolized bronchodilator medication orders have been revised according to the RT Inhaler-Nebulizer Bronchodilator Protocol below.    Respiratory Therapist to perform RT Therapy Protocol Assessment initially then follow the protocol.  Repeat RT Therapy Protocol Assessment PRN for score 0-3 or on second treatment, BID, and PRN for scores above 3.    No Indications - adjust the frequency to every 6 hours PRN wheezing or bronchospasm, if no treatments needed after 48 hours then discontinue using Per Protocol order mode.     If indication present, adjust the RT bronchodilator orders based on the Bronchodilator Assessment Score as indicated below.  Use Inhaler orders unless patient has one or more of the following: on home nebulizer, not able to hold breath for 10 seconds, is not alert and oriented, cannot activate and use MDI correctly, or respiratory rate 25 breaths per minute or more, then use the equivalent nebulizer order(s) with same Frequency and PRN reasons based on the score.  If a patient is on this medication at home then do not decrease Frequency below that used at home.    0-3 - enter or revise RT bronchodilator order(s) to equivalent RT Bronchodilator order with Frequency of every 4 
RT Inhaler-Nebulizer Bronchodilator Protocol Note    There is a bronchodilator order in the chart from a provider indicating to follow the RT Bronchodilator Protocol and there is an “Initiate RT Inhaler-Nebulizer Bronchodilator Protocol” order as well (see protocol at bottom of note).    CXR Findings:  No results found.    The findings from the last RT Protocol Assessment were as follows:   History Pulmonary Disease: (P) Chronic pulmonary disease  Respiratory Pattern: (P) Dyspnea on exertion or RR 21-25 bpm  Breath Sounds: (P) Slightly diminished and/or crackles  Cough: (P) Strong, spontaneous, non-productive  Indication for Bronchodilator Therapy: (P) Decreased or absent breath sounds  Bronchodilator Assessment Score: (P) 6    Aerosolized bronchodilator medication orders have been revised according to the RT Inhaler-Nebulizer Bronchodilator Protocol below.    Respiratory Therapist to perform RT Therapy Protocol Assessment initially then follow the protocol.  Repeat RT Therapy Protocol Assessment PRN for score 0-3 or on second treatment, BID, and PRN for scores above 3.    No Indications - adjust the frequency to every 6 hours PRN wheezing or bronchospasm, if no treatments needed after 48 hours then discontinue using Per Protocol order mode.     If indication present, adjust the RT bronchodilator orders based on the Bronchodilator Assessment Score as indicated below.  Use Inhaler orders unless patient has one or more of the following: on home nebulizer, not able to hold breath for 10 seconds, is not alert and oriented, cannot activate and use MDI correctly, or respiratory rate 25 breaths per minute or more, then use the equivalent nebulizer order(s) with same Frequency and PRN reasons based on the score.  If a patient is on this medication at home then do not decrease Frequency below that used at home.    0-3 - enter or revise RT bronchodilator order(s) to equivalent RT Bronchodilator order with Frequency of every 4 
RT Inhaler-Nebulizer Bronchodilator Protocol Note    There is a bronchodilator order in the chart from a provider indicating to follow the RT Bronchodilator Protocol and there is an “Initiate RT Inhaler-Nebulizer Bronchodilator Protocol” order as well (see protocol at bottom of note).    CXR Findings:  No results found.    The findings from the last RT Protocol Assessment were as follows:   History Pulmonary Disease: (P) Chronic pulmonary disease  Respiratory Pattern: (P) Regular pattern and RR 12-20 bpm  Breath Sounds: (P) Slightly diminished and/or crackles  Cough: (P) Strong, spontaneous, non-productive  Indication for Bronchodilator Therapy: (P) Decreased or absent breath sounds  Bronchodilator Assessment Score: (P) 4    Aerosolized bronchodilator medication orders have been revised according to the RT Inhaler-Nebulizer Bronchodilator Protocol below.    Respiratory Therapist to perform RT Therapy Protocol Assessment initially then follow the protocol.  Repeat RT Therapy Protocol Assessment PRN for score 0-3 or on second treatment, BID, and PRN for scores above 3.    No Indications - adjust the frequency to every 6 hours PRN wheezing or bronchospasm, if no treatments needed after 48 hours then discontinue using Per Protocol order mode.     If indication present, adjust the RT bronchodilator orders based on the Bronchodilator Assessment Score as indicated below.  Use Inhaler orders unless patient has one or more of the following: on home nebulizer, not able to hold breath for 10 seconds, is not alert and oriented, cannot activate and use MDI correctly, or respiratory rate 25 breaths per minute or more, then use the equivalent nebulizer order(s) with same Frequency and PRN reasons based on the score.  If a patient is on this medication at home then do not decrease Frequency below that used at home.    0-3 - enter or revise RT bronchodilator order(s) to equivalent RT Bronchodilator order with Frequency of every 4 
RT Inhaler-Nebulizer Bronchodilator Protocol Note    There is a bronchodilator order in the chart from a provider indicating to follow the RT Bronchodilator Protocol and there is an “Initiate RT Inhaler-Nebulizer Bronchodilator Protocol” order as well (see protocol at bottom of note).    CXR Findings:  No results found.    The findings from the last RT Protocol Assessment were as follows:   History Pulmonary Disease: Chronic pulmonary disease  Respiratory Pattern: Regular pattern and RR 12-20 bpm  Breath Sounds: Slightly diminished and/or crackles  Cough: Strong, spontaneous, non-productive  Indication for Bronchodilator Therapy: Decreased or absent breath sounds  Bronchodilator Assessment Score: 4    Aerosolized bronchodilator medication orders have been revised according to the RT Inhaler-Nebulizer Bronchodilator Protocol below.    Respiratory Therapist to perform RT Therapy Protocol Assessment initially then follow the protocol.  Repeat RT Therapy Protocol Assessment PRN for score 0-3 or on second treatment, BID, and PRN for scores above 3.    No Indications - adjust the frequency to every 6 hours PRN wheezing or bronchospasm, if no treatments needed after 48 hours then discontinue using Per Protocol order mode.     If indication present, adjust the RT bronchodilator orders based on the Bronchodilator Assessment Score as indicated below.  Use Inhaler orders unless patient has one or more of the following: on home nebulizer, not able to hold breath for 10 seconds, is not alert and oriented, cannot activate and use MDI correctly, or respiratory rate 25 breaths per minute or more, then use the equivalent nebulizer order(s) with same Frequency and PRN reasons based on the score.  If a patient is on this medication at home then do not decrease Frequency below that used at home.    0-3 - enter or revise RT bronchodilator order(s) to equivalent RT Bronchodilator order with Frequency of every 4 hours PRN for wheezing or 
Received pt from OR via stretcher, VSS with freq coughing, placed on non rebreather during coughing episodes  
Resting in bed awake. No complaints or needs at present time. Am assessment complete. Alert and oriented. Bed alarm in place and turned on. Call light in reach.     Bedside Mobility Assessment Tool (BMAT):     Assessment Level 1- Sit and Shake    1. From a semi-reclined position, ask patient to sit up and rotate to a seated position at the side of the bed. Can use the bedrail.    2. Ask patient to reach out and grab your hand and shake making sure patient reaches across his/her midline.   Pass- Patient is able to come to a seated position, maintain core strength. Maintains seated balance while reaching across midline. Move on to Assessment Level 2.     Assessment Level 2- Stretch and Point   1. With patient in seated position at the side of the bed, have patient place both feet on the floor (or stool) with knees no higher than hips.    2. Ask patient to stretch one leg and straighten the knee, then bend the ankle/flex and point the toes. If appropriate, repeat with the other leg.   Pass- Patient is able to demonstrate appropriate quad strength on intended weight bearing limb(s). Move onto Assessment Level 3.     Assessment Level 3- Stand   1. Ask patient to elevate off the bed or chair (seated to standing) using an assistive device (cane, bedrail).    2. Patient should be able to raise buttocks off be and hold for a count of five. May repeat once.   Pass- Patient maintains standing stability for at least 5 seconds, proceed to assessment level 4.    Assessment Level 4- Walk   1. Ask patient to march in place at bedside.    2. Then ask patient to advance step and return each foot. Some medical conditions may render a patient from stepping backwards, use your best clinical judgement.   Pass- Patient demonstrates balance while shifting weight and ability to step, takes independent steps, does not use assistive device patient is MOBILITY LEVEL 4.      Mobility Level- 4    
Returned to room 220 from PACU in stable condition.  V.s.s. alert and oriented. Bed alarm in place and turned on. Dressing to scrotum intact with drainage noted. Chux applied under scrotum. NO pain at present time. Call light in reach.  
Scrotal dressing changed. Old gauze removed and repacked with wet to dry gauze, covered with abd pad held in place by disposable undergarments per order  
Transferred care to CONSTANTINO Ricketts. Face to face bedside report given, no need voiced at this time.     
Transport here report called to [facility].  
    10/22/24  0636 10/23/24  0616 10/24/24  0555   WBC 8.0 5.1 4.8   HGB 12.9* 12.6* 12.7*   HCT 37.8* 37.5* 37.5*   MCV 92.7 92.7 92.8    193 198     BMP:   Recent Labs     10/22/24  0636 10/23/24  0615 10/24/24  0555    140 139   K 3.6 3.5 3.4*    98* 98*   CO2 29 33* 32   BUN 7 6* 6*   CREATININE 0.6* 0.7* 0.7*     LIVER PROFILE:   Recent Labs     10/22/24  0636 10/23/24  0615 10/24/24  0555   AST 20 17 15   ALT 23 20 15   BILITOT 0.4 <0.2 <0.2   ALKPHOS 183* 185* 163*     PT/INR: No results for input(s): \"PROTIME\", \"INR\" in the last 72 hours.    CULTURES  Results       Procedure Component Value Units Date/Time    MRSA DNA Probe, Nasal [[id number]] Collected: 10/22/24 1555    Order Status: Completed Specimen: Nose from Nares Updated: 10/23/24 1303     MRSA SCREEN RT-PCR --     Negative  MRSA DNA not detected.  Normal Range: Not detected      Narrative:      ORDER#: [id number]                          ORDERED BY: LINDY DE SANTIAGO  SOURCE: Nares                              COLLECTED:  10/22/24 15:55  ANTIBIOTICS AT [facility].:                      RECEIVED :  10/23/24 07:20    Culture, Anaerobic and Aerobic [[id number]] Collected: 10/22/24 1422    Order Status: Completed Specimen: Tissue Updated: 10/24/24 0955     Gram Stain Result 2+ WBC's  3+ RBC's  No organisms seen       WOUND/ABSCESS --     Specimen submitted on swab, interpret with caution  No growth to date  No growth 36 to 48 hours       Anaerobic Culture --     Anaerobic culture further report to follow  No anaerobes isolated so far, Further report to follow      Narrative:      ORDER#: [id number]                          ORDERED BY: LINDY DE SANTIAGO  SOURCE: Tissue Perineal abscess            COLLECTED:  10/22/24 14:22  ANTIBIOTICS AT [facility].:                      RECEIVED :  10/23/24 00:33    Blood Culture 2 [[id number]] Collected: 10/21/24 1402    Order Status: Completed Specimen: Blood Updated: 10/23/24 0115     Culture, Blood 2 No Growth to date. 
  LEUKOCYTESUR Negative   UROBILINOGEN 1.0   BILIRUBINUR Negative   BLOODU Negative   GLUCOSEU Negative         IMAGING  CT ABDOMEN PELVIS W IV CONTRAST Additional Contrast? None   Final Result   Tubular fluid collection seen in right inferior scrotal region, suspicious   for abscess in the appropriate clinical scenario.      Lobular contour to the liver is seen suggesting underlying cirrhosis               ASSESSMENT:  Sandor Early is a 69 y.o. male with a pmhx of dCHF, COPD, chronic respiratory failure, DMII, HTN, ROCKWELL cirrhosis, PAD s/p aortofemoral bypass, hx of bladder cancer and chronic pain who presented with pain and swelling at the base of his scrotal area.    CT 10/21 showed tubular fluid collection in the right inferior scrotal region.     Perineal abscess s/p operative incision and drainage POD #1    Afebrile  WBC normal      PLAN:  Follow up with Dr. Velasquez 1 week after discharge  NS wet to dry gauze packing (wound tracks toward scrotum approximately 5 cm) - change daily and PRN   Patient may shower - no soaking (baths, pools, hot tubs, lakes, rivers, etc.) until fully healed  Broad spectrum abx - follow culture, currently no organisms seen / NGTD - if culture is unrevealing, recommend Augmentin to complete 10 day course  Diet as tolerated  PRN pain control and antiemetics  DVT prophylaxis with Lx      We will sign off. Would be happy to see again if need arises.       Dispo: okay to discharge from surgical perspective once medically stable and disposition (home with [facility] vs SNF) has been established    Adrián Maria PA-C  10/23/2024 11:50 AM      
tasks OR requires use of assistive device. Patient is MOBILITY LEVEL 3.       Mobility Level- 3    
  Not Tested  LE:    Independent manage socks in bed using figure four technique    Eating:   Independent    Grooming/hygiene: Min A use shampoo cap seated, IND comb hair    Activity Tolerance:   Pt completed therapy session with pain    Positioning Needs:   Pt up in chair, no alarm needed, positioned in proper neutral alignment and pressure relief provided. Waffle cushion in chair.    Patient/Family Education:   Pt educated on role of inpatient OT, plan of care, importance of continued activity, DC recommendations and Calling for assist with mobility.    CHF Education  N/A    Assessment:  Pt seen for Occupational therapy evaluation in acute care setting.  Pt demonstrated decreased ADLs and IADLs. Pt limited by wound location and pain.    Recommending Home PRN assist upon discharge as patient functioning close to baseline level. Likely needs SNF for nursing/wound care.    Goal(s) :   Supervision functional transfers for ADL tasks. MET 10/24/24    Plan:  DC acute OT. Please reorder if additional needs arise.    Electronically signed by Nola Monterroso OT on 10/24/2024 at 10:16 AM      If patient discharges from this facility prior to next visit, this note will serve as the Discharge Summary     
Allopurinol.     #Cirrhosis  -secondary to ROCKWELL, alcohol     #PAD s.p aortofemoral bypass  -on statin   -had normal arterial duplexes in 2020    #H/o bladder cancer     #Chronic pain  -continue home Oxycodone.    #BPH  -on Flomax.     #Obesity  -Body mass index is 31.31 kg/m².  -Recommended weight loss    #Tobacco Dependence  -Recommended cessation    DVT Prophylaxis: Lovenox  Diet: ADULT DIET; Regular; 4 carb choices (60 gm/meal)  Code Status: Full Code    DC to skilled nursing facility today for continue dressing changes and wound care       Total time 35 minutes. > 50%  dominated by counseling and coordination of care.       Shelby Hand MD  10/25/2024

## 2024-10-25 NOTE — FLOWSHEET NOTE
10/25/24 0330   Vital Signs   Temp 97.7 °F (36.5 °C)   Temp Source Oral   Pulse 70   Heart Rate Source Monitor   Respirations 18   /64   MAP (Calculated) 77   BP Location Right upper arm   BP Method Automatic   Patient Position Semi corwinwlers   Pain Assessment   Pain Assessment None - Denies Pain   Care Plan - Pain Goals   Verbalizes/displays adequate comfort level or baseline comfort level Encourage patient to monitor pain and request assistance   Oxygen Therapy   SpO2 98 %   Pulse Oximetry Type Intermittent   Pulse Oximeter Device Mode Intermittent   Pulse Oximeter Device Location Left;Finger   O2 Device Nasal cannula   Skin Assessment Clean, dry, & intact   O2 Flow Rate (L/min) 4 L/min   Oxygen Therapy Supplemental oxygen

## 2024-10-26 LAB
BACTERIA BLD CULT ORG #2: NORMAL
BACTERIA BLD CULT: NORMAL

## 2024-10-31 ENCOUNTER — OUTSIDE SERVICES (OUTPATIENT)
Dept: SURGERY | Age: 69
End: 2024-10-31

## 2024-10-31 DIAGNOSIS — L02.215 PERINEAL ABSCESS: Primary | ICD-10-CM

## 2024-10-31 PROCEDURE — 99024 POSTOP FOLLOW-UP VISIT: CPT | Performed by: SURGERY

## 2024-11-14 ENCOUNTER — OUTSIDE SERVICES (OUTPATIENT)
Dept: SURGERY | Age: 69
End: 2024-11-14
Payer: MEDICARE

## 2024-11-14 DIAGNOSIS — L02.215 PERINEAL ABSCESS: Primary | ICD-10-CM

## 2024-11-14 PROCEDURE — 99213 OFFICE O/P EST LOW 20 MIN: CPT | Performed by: SURGERY

## 2024-11-15 ENCOUNTER — TELEPHONE (OUTPATIENT)
Dept: FAMILY MEDICINE CLINIC | Age: 69
End: 2024-11-15

## 2024-11-15 NOTE — TELEPHONE ENCOUNTER
Care Transitions Initial Follow Up Call    Outreach made within 2 business days of discharge: Yes    Patient: Sandor Early Patient : 1955   MRN: [id number]  Reason for Admission: Perineal abscess  Discharge Date: 10/25/24       Spoke with: Spouse    Discharge department/facility: [facility] Interactive Patient Contact:  Was patient able to fill all prescriptions: Yes  Was patient instructed to bring all medications to the follow-up visit: Yes  Is patient taking all medications as directed in the discharge summary? Yes  Does patient understand their discharge instructions: Yes  Does patient have questions or concerns that need addressed prior to 7-14 day follow up office visit: no    Additional needs identified to be addressed with provider  No needs identified             Scheduled appointment with PCP within 7-14 days    Follow Up  Future Appointments   Date Time Provider Department Center   2024 10:00 AM MHC [facility] MAIN [facility]   2024 10:45 AM [facility]   2024  1:40 PM [facility]       Ronna Rogers MA

## 2024-11-16 NOTE — PROGRESS NOTES
[facility] SURGERY - [facility]      S:   Patient presents s/p I&D perineal abscess 3 weeks  ago.   He reports improvement.    O:   Comfortable.  No distress.    Vitals stable.         Incision site healing well.   Good granulation.   Nearly healed.   Chest CTA   Heart regular              A:   S/P above    P:   Follow up as needed.

## 2024-11-16 NOTE — PROGRESS NOTES
[facility] SURGERY - [facility]      S:   Patient presents s/p I&D perinea abscess 10 days  ago.   He reports improvement.    O:   Comfortable         Incision site healing well.   Good granulation.   No evident pus.               A:   S/P above    P:   Continue local wound care.   Finish antibiotics.   Follow up 2 weeks.

## 2024-11-24 ENCOUNTER — APPOINTMENT (OUTPATIENT)
Dept: GENERAL RADIOLOGY | Age: 69
End: 2024-11-24
Payer: MEDICARE

## 2024-11-24 ENCOUNTER — HOSPITAL ENCOUNTER (EMERGENCY)
Age: 69
Discharge: HOME | End: 2024-11-24
Attending: STUDENT IN AN ORGANIZED HEALTH CARE EDUCATION/TRAINING PROGRAM
Payer: MEDICARE

## 2024-11-24 ENCOUNTER — ANCILLARY PROCEDURE (OUTPATIENT)
Dept: EMERGENCY DEPT | Age: 69
End: 2024-11-24
Attending: STUDENT IN AN ORGANIZED HEALTH CARE EDUCATION/TRAINING PROGRAM
Payer: MEDICARE

## 2024-11-24 VITALS
RESPIRATION RATE: 20 BRPM | DIASTOLIC BLOOD PRESSURE: 78 MMHG | OXYGEN SATURATION: 98 % | HEIGHT: 67 IN | TEMPERATURE: 98.1 F | HEART RATE: 105 BPM | WEIGHT: 207 LBS | SYSTOLIC BLOOD PRESSURE: 126 MMHG | BODY MASS INDEX: 32.49 KG/M2

## 2024-11-24 DIAGNOSIS — E83.42 HYPOMAGNESEMIA: ICD-10-CM

## 2024-11-24 DIAGNOSIS — E87.6 HYPOKALEMIA: ICD-10-CM

## 2024-11-24 DIAGNOSIS — J44.1 COPD EXACERBATION (HCC): Primary | ICD-10-CM

## 2024-11-24 LAB
ALBUMIN SERPL-MCNC: 4.3 G/DL (ref 3.4–5)
ALBUMIN/GLOB SERPL: 1 {RATIO} (ref 1.1–2.2)
ALP SERPL-CCNC: 186 U/L (ref 40–129)
ALT SERPL-CCNC: 12 U/L (ref 10–40)
ANION GAP SERPL CALCULATED.3IONS-SCNC: 10 MMOL/L (ref 3–16)
AST SERPL-CCNC: 17 U/L (ref 15–37)
BASE EXCESS BLDV CALC-SCNC: 5.1 MMOL/L (ref -3–3)
BASOPHILS # BLD: 0 K/UL (ref 0–0.2)
BASOPHILS NFR BLD: 0.2 %
BILIRUB SERPL-MCNC: 0.3 MG/DL (ref 0–1)
BUN SERPL-MCNC: 10 MG/DL (ref 7–20)
CALCIUM SERPL-MCNC: 10 MG/DL (ref 8.3–10.6)
CHLORIDE SERPL-SCNC: 97 MMOL/L (ref 99–110)
CO2 BLDV-SCNC: 34 MMOL/L
CO2 SERPL-SCNC: 36 MMOL/L (ref 21–32)
COHGB MFR BLDV: 2.1 % (ref 0–1.5)
CREAT SERPL-MCNC: 0.7 MG/DL (ref 0.8–1.3)
DEPRECATED RDW RBC AUTO: 15.3 % (ref 12.4–15.4)
EKG ATRIAL RATE: 116 BPM
EKG DIAGNOSIS: NORMAL
EKG P AXIS: 41 DEGREES
EKG P-R INTERVAL: 192 MS
EKG Q-T INTERVAL: 316 MS
EKG QRS DURATION: 86 MS
EKG QTC CALCULATION (BAZETT): 439 MS
EKG R AXIS: -65 DEGREES
EKG T AXIS: 68 DEGREES
EKG VENTRICULAR RATE: 116 BPM
EOSINOPHIL # BLD: 0.4 K/UL (ref 0–0.6)
EOSINOPHIL NFR BLD: 4.1 %
FLUAV RNA UPPER RESP QL NAA+PROBE: NEGATIVE
FLUBV AG NPH QL: NEGATIVE
GFR SERPLBLD CREATININE-BSD FMLA CKD-EPI: >90 ML/MIN/{1.73_M2}
GLUCOSE SERPL-MCNC: 157 MG/DL (ref 70–99)
HCO3 BLDV-SCNC: 32.5 MMOL/L (ref 23–29)
HCT VFR BLD AUTO: 42.2 % (ref 40.5–52.5)
HGB BLD-MCNC: 13.8 G/DL (ref 13.5–17.5)
LYMPHOCYTES # BLD: 1.7 K/UL (ref 1–5.1)
LYMPHOCYTES NFR BLD: 16.9 %
MAGNESIUM SERPL-MCNC: 1.2 MG/DL (ref 1.8–2.4)
MCH RBC QN AUTO: 30.4 PG (ref 26–34)
MCHC RBC AUTO-ENTMCNC: 32.7 G/DL (ref 31–36)
MCV RBC AUTO: 93.1 FL (ref 80–100)
METHGB MFR BLDV: 0.3 %
MONOCYTES # BLD: 1 K/UL (ref 0–1.3)
MONOCYTES NFR BLD: 9.8 %
NEUTROPHILS # BLD: 6.8 K/UL (ref 1.7–7.7)
NEUTROPHILS NFR BLD: 69 %
NT-PROBNP SERPL-MCNC: 105 PG/ML (ref 0–124)
O2 CT VFR BLDV CALC: 8 VOL %
O2 THERAPY: ABNORMAL
PCO2 BLDV: 59.4 MMHG (ref 40–50)
PH BLDV: 7.36 [PH] (ref 7.35–7.45)
PLATELET # BLD AUTO: 275 K/UL (ref 135–450)
PMV BLD AUTO: 7.2 FL (ref 5–10.5)
PO2 BLDV: 23.6 MMHG (ref 25–40)
POTASSIUM SERPL-SCNC: 3 MMOL/L (ref 3.5–5.1)
PROT SERPL-MCNC: 8.8 G/DL (ref 6.4–8.2)
RBC # BLD AUTO: 4.53 M/UL (ref 4.2–5.9)
SAO2 % BLDV: 38 %
SARS-COV-2 RDRP RESP QL NAA+PROBE: NOT DETECTED
SODIUM SERPL-SCNC: 143 MMOL/L (ref 136–145)
TROPONIN, HIGH SENSITIVITY: 17 NG/L (ref 0–22)
TROPONIN, HIGH SENSITIVITY: 19 NG/L (ref 0–22)
WBC # BLD AUTO: 9.8 K/UL (ref 4–11)

## 2024-11-24 PROCEDURE — 87804 INFLUENZA ASSAY W/OPTIC: CPT

## 2024-11-24 PROCEDURE — 93010 ELECTROCARDIOGRAM REPORT: CPT | Performed by: INTERNAL MEDICINE

## 2024-11-24 PROCEDURE — 36415 COLL VENOUS BLD VENIPUNCTURE: CPT

## 2024-11-24 PROCEDURE — 6360000002 HC RX W HCPCS: Performed by: STUDENT IN AN ORGANIZED HEALTH CARE EDUCATION/TRAINING PROGRAM

## 2024-11-24 PROCEDURE — 99285 EMERGENCY DEPT VISIT HI MDM: CPT

## 2024-11-24 PROCEDURE — 87635 SARS-COV-2 COVID-19 AMP PRB: CPT

## 2024-11-24 PROCEDURE — 96365 THER/PROPH/DIAG IV INF INIT: CPT

## 2024-11-24 PROCEDURE — 93005 ELECTROCARDIOGRAM TRACING: CPT | Performed by: STUDENT IN AN ORGANIZED HEALTH CARE EDUCATION/TRAINING PROGRAM

## 2024-11-24 PROCEDURE — 6370000000 HC RX 637 (ALT 250 FOR IP): Performed by: STUDENT IN AN ORGANIZED HEALTH CARE EDUCATION/TRAINING PROGRAM

## 2024-11-24 PROCEDURE — 80053 COMPREHEN METABOLIC PANEL: CPT

## 2024-11-24 PROCEDURE — 2580000003 HC RX 258: Performed by: STUDENT IN AN ORGANIZED HEALTH CARE EDUCATION/TRAINING PROGRAM

## 2024-11-24 PROCEDURE — 71045 X-RAY EXAM CHEST 1 VIEW: CPT

## 2024-11-24 PROCEDURE — 83735 ASSAY OF MAGNESIUM: CPT

## 2024-11-24 PROCEDURE — 85025 COMPLETE CBC W/AUTO DIFF WBC: CPT

## 2024-11-24 PROCEDURE — 93308 TTE F-UP OR LMTD: CPT

## 2024-11-24 PROCEDURE — 82803 BLOOD GASES ANY COMBINATION: CPT

## 2024-11-24 PROCEDURE — 84484 ASSAY OF TROPONIN QUANT: CPT

## 2024-11-24 PROCEDURE — 96375 TX/PRO/DX INJ NEW DRUG ADDON: CPT

## 2024-11-24 PROCEDURE — 83880 ASSAY OF NATRIURETIC PEPTIDE: CPT

## 2024-11-24 RX ORDER — MAGNESIUM SULFATE IN WATER 40 MG/ML
2000 INJECTION, SOLUTION INTRAVENOUS ONCE
Status: COMPLETED | OUTPATIENT
Start: 2024-11-24 | End: 2024-11-24

## 2024-11-24 RX ORDER — AZITHROMYCIN 250 MG/1
500 TABLET, FILM COATED ORAL ONCE
Status: COMPLETED | OUTPATIENT
Start: 2024-11-24 | End: 2024-11-24

## 2024-11-24 RX ORDER — PREDNISONE 10 MG/1
TABLET ORAL
Qty: 20 TABLET | Refills: 0 | Status: SHIPPED | OUTPATIENT
Start: 2024-11-24 | End: 2024-12-04

## 2024-11-24 RX ORDER — AZITHROMYCIN 500 MG/1
500 TABLET, FILM COATED ORAL DAILY
Qty: 3 TABLET | Refills: 0 | Status: SHIPPED | OUTPATIENT
Start: 2024-11-24 | End: 2024-11-27

## 2024-11-24 RX ORDER — POTASSIUM CHLORIDE 750 MG/1
40 TABLET, EXTENDED RELEASE ORAL ONCE
Status: COMPLETED | OUTPATIENT
Start: 2024-11-24 | End: 2024-11-24

## 2024-11-24 RX ORDER — IPRATROPIUM BROMIDE AND ALBUTEROL SULFATE 2.5; .5 MG/3ML; MG/3ML
1 SOLUTION RESPIRATORY (INHALATION) ONCE
Status: COMPLETED | OUTPATIENT
Start: 2024-11-24 | End: 2024-11-24

## 2024-11-24 RX ADMIN — AZITHROMYCIN 500 MG: 250 TABLET, FILM COATED ORAL at 15:12

## 2024-11-24 RX ADMIN — IPRATROPIUM BROMIDE AND ALBUTEROL SULFATE 1 DOSE: 2.5; .5 SOLUTION RESPIRATORY (INHALATION) at 14:15

## 2024-11-24 RX ADMIN — POTASSIUM CHLORIDE 40 MEQ: 750 TABLET, EXTENDED RELEASE ORAL at 15:13

## 2024-11-24 RX ADMIN — MAGNESIUM SULFATE HEPTAHYDRATE 2000 MG: 40 INJECTION, SOLUTION INTRAVENOUS at 16:10

## 2024-11-24 RX ADMIN — METHYLPREDNISOLONE SODIUM SUCCINATE 125 MG: 125 INJECTION INTRAMUSCULAR; INTRAVENOUS at 14:14

## 2024-11-24 RX ADMIN — IPRATROPIUM BROMIDE AND ALBUTEROL SULFATE 1 DOSE: 2.5; .5 SOLUTION RESPIRATORY (INHALATION) at 17:36

## 2024-11-24 ASSESSMENT — PAIN DESCRIPTION - PAIN TYPE: TYPE: CHRONIC PAIN

## 2024-11-24 ASSESSMENT — PAIN DESCRIPTION - LOCATION: LOCATION: LEG

## 2024-11-24 ASSESSMENT — PAIN DESCRIPTION - DESCRIPTORS: DESCRIPTORS: NUMBNESS

## 2024-11-24 ASSESSMENT — PAIN SCALES - GENERAL: PAINLEVEL_OUTOF10: 5

## 2024-11-24 ASSESSMENT — PAIN DESCRIPTION - FREQUENCY: FREQUENCY: CONTINUOUS

## 2024-11-24 ASSESSMENT — PAIN - FUNCTIONAL ASSESSMENT
PAIN_FUNCTIONAL_ASSESSMENT: 0-10
PAIN_FUNCTIONAL_ASSESSMENT: NONE - DENIES PAIN
PAIN_FUNCTIONAL_ASSESSMENT: NONE - DENIES PAIN

## 2024-11-24 ASSESSMENT — PAIN DESCRIPTION - ONSET: ONSET: ON-GOING

## 2024-11-24 ASSESSMENT — PAIN DESCRIPTION - ORIENTATION: ORIENTATION: RIGHT;LEFT

## 2024-11-24 NOTE — DISCHARGE INSTRUCTIONS
You were evaluated in the emergency department for shortness of breath. Assessments and testing completed during your visit were reassuring and at this time there is no indication for further testing, treatment or admission to the hospital. Given this it is appropriate to discharge you from the emergency department. At the time of discharge we discussed the following:    Please review this visit to the emergency department with your primary doctor for further recommendations take your antibiotics and steroids to completion and I expect your condition to improve return with any new or worsening symptoms.    Please note that sometimes it is difficult to diagnose a medical condition early in the disease process before the disease is fully manifest. Because of this, should you develop any new or worsening symptoms, you may return at any time to the emergency department for another evaluation. If available you are also recommended to review this visit with your primary care physician or other medical provider in the next 7 days. Thank you for allowing us to care for you today.

## 2024-11-25 ENCOUNTER — CARE COORDINATION (OUTPATIENT)
Dept: CARE COORDINATION | Age: 69
End: 2024-11-25

## 2024-11-25 RX ORDER — OXYCODONE HYDROCHLORIDE 10 MG/1
10 TABLET ORAL EVERY 8 HOURS PRN
COMMUNITY

## 2024-11-25 NOTE — CARE COORDINATION
Ambulatory Care Coordination Note     2024 3:03 PM     Patient Current Location:  Home: [address] [place], [place] [address]y [address]  [place] [address]     This patient was received as a referral from Population health report .    ACM contacted the patient by telephone. Verified name and  with patient as identifiers. Provided introduction to self, and explanation of the ACM role.   Patient accepted care management services at this time.          ACM: Gia Hoffman RN     Challenges to be reviewed by the provider   Additional needs identified to be addressed with provider No  none               Method of communication with provider: chart routing.    Utilization: N/A - Initial Call     Care Summary Note: Follow up call from ER visit, COPD [facility]   Recent dc from SNF on 11/15  [place] [facility] will mail out info     REports he needs to quit smoking   Long discussion , will mail out lit  Asked to develop a plan    Hemoglobin A1C   Date Value Ref Range Status   2024 5.9 See comment % Final     Comment:     Comment:  Diagnosis of Diabetes: > or = 6.5%  Increased risk of diabetes (Prediabetes): 5.7-6.4%  Glycemic Control: Nonpregnant Adults: <7.0%                    Pregnant: <6.0%         Past Medical History:   Diagnosis Date    Bladder outlet obstruction 2017    Carpal tunnel syndrome of left wrist 2013    Cellulitis of right lower extremity 2023    Cellulitis of right upper extremity 2023    w/ Group A Strep bacteremia    COPD exacerbation (HCC) 2023    Cough syncope 01/10/2023    Diabetic ketoacidosis without coma associated with type 2 diabetes mellitus (HCC) 2018    GI bleed 2022    Gout 2013    Hilar adenopathy     Iron deficiency anemia     Malignant neoplasm of urinary bladder (HCC) 2016    Multiple duodenal ulcers     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

## 2024-12-03 ENCOUNTER — CARE COORDINATION (OUTPATIENT)
Dept: CARE COORDINATION | Age: 69
End: 2024-12-03

## 2024-12-03 NOTE — CARE COORDINATION
Ambulatory Care Coordination Note     12/3/2024 12:39 PM     Patient Current Location:  Home: [address] [place] [address] [address]  [place] [address]     ACM contacted the patient by telephone. Verified name and  with patient as identifiers.         ACM: Gia Hoffman RN     Challenges to be reviewed by the provider   Additional needs identified to be addressed with provider No  none               Method of communication with provider: chart routing.    Utilization: Patient has not had any utilization since our last call.     Care Summary Note: feeling much better, completed antbx, pred  Spo2 97 % on 4 lpm    15 units insulin  Will call pcp to schedule   Brother drives him to appts, or his son  Denies need for RT  Pain dr tomorrow      Offered patient enrollment in the Remote Patient Monitoring (RPM) program for in-home monitoring: Yes, but did not enroll at this time: already monitoring with home equipment.     Assessments Completed:   COPD Assessment    Does the patient understand envrionmental exposure?: Yes  Is the patient able to verbalize Rescue vs. Long Acting medications?: Yes  Does the patient have a nebulizer?: Yes  Does the patient use a space with inhaled medications?: No     No patient-reported symptoms         Symptoms:     Have you had a recent diagnosis of pneumonia either by PCP or at a hospital?: No          Medications Reviewed:   Patient denies any changes with medications and reports taking all medications as prescribed.    Advance Care Planning:   Not reviewed during this call     Care Planning:    Goals Addressed    None          PCP/Specialist follow up:       Follow Up:   Plan for next AC outreach in approximately 1 week to complete:  - disease specific assessments  - medication review   - advance care planning   - education   - follow up appointment with Camron .  Pulm/ ct scan annual   Patient  is agreeable to this plan.

## 2024-12-04 RX ORDER — BLOOD PRESSURE TEST KIT
KIT MISCELLANEOUS
Qty: 100 EACH | Refills: 5 | Status: SHIPPED | OUTPATIENT
Start: 2024-12-04

## 2024-12-04 NOTE — TELEPHONE ENCOUNTER
Refill Request     CONFIRM preferrred pharmacy with the patient.    If Mail Order Rx - Pend for 90 day refill.      Last Seen: Last Seen Department: 10/2/2024  Last Seen by PCP: 10/2/2024    Last Written: 11/1/23    If no future appointment scheduled, route STAFF MESSAGE with patient name to the  Pool for scheduling.      Next Appointment:   No future appointments.    Message sent to  to schedule appt with patient?  N/A      Requested Prescriptions     Pending Prescriptions Disp Refills    Alcohol Swabs PADS [Pharmacy Med Name: ALCOHOL SWABS 70% PAD] 100 each 5     Sig: USE AS DIRECTED TO TEST AND INSULIN        Home

## 2024-12-09 DIAGNOSIS — E11.42 DIABETIC POLYNEUROPATHY ASSOCIATED WITH TYPE 2 DIABETES MELLITUS (HCC): ICD-10-CM

## 2024-12-09 DIAGNOSIS — F41.9 ANXIETY: ICD-10-CM

## 2024-12-09 DIAGNOSIS — F41.8 SITUATIONAL ANXIETY: ICD-10-CM

## 2024-12-09 RX ORDER — DULOXETIN HYDROCHLORIDE 60 MG/1
CAPSULE, DELAYED RELEASE ORAL
Qty: 90 CAPSULE | Refills: 1 | Status: SHIPPED | OUTPATIENT
Start: 2024-12-09

## 2024-12-12 ENCOUNTER — CARE COORDINATION (OUTPATIENT)
Dept: CARE COORDINATION | Age: 69
End: 2024-12-12

## 2024-12-12 NOTE — CARE COORDINATION
Ambulatory Care Coordination Note     2024 12:53 PM     Patient Current Location:  Home: [address] [place], [place] [address] [address]  [place] [address]     ACM contacted the patient by telephone. Verified name and  with patient as identifiers.         ACM: Gia Hoffman RN     Challenges to be reviewed by the provider   Additional needs identified to be addressed with provider No  none               Method of communication with provider: chart routing.    Utilization: Patient has not had any utilization since our last call.     Care Summary Note:   Reports doing well   Needs to see eye dr- needs referral, heard there is an eye dr at [place]  Neuropathy- watches diet, pain mgt follows  Will mail out ACP docs/ paper to doc BP/ BG  PLAN   Schedule w PCP  Mail ACP docs/ BG/ BP grids  [facility]- taking forms to [employer] today  Schedule eye dr    Offered patient enrollment in the Remote Patient Monitoring (RPM) program for in-home monitoring: Yes, but did not enroll at this time: already monitoring with home equipment.     Assessments Completed:   No changes since last call    Medications Reviewed:   Patient denies any changes with medications and reports taking all medications as prescribed.    Advance Care Planning:   Reviewed and needs to be updated     Care Planning:   Education Documentation  No documentation found.  Education Comments  No comments found.     ,    Goals Addressed                   This Visit's Progress     Conditions and Symptoms        I will schedule office visits, as directed by my provider.  I will keep my appointment or reschedule if I have to cancel.  I will notify my provider of any barriers to my plan of care.  I will follow my Zone Management tool to seek urgent or emergent care.  I will notify my provider of any symptoms that indicate a worsening of my condition.    Barriers: none  Plan for overcoming my barriers: N/A  Confidence: 9/10  Anticipated Goal Completion Date: 24      Pain

## 2024-12-12 NOTE — ACP (ADVANCE CARE PLANNING)
Advance Care Planning     General Advance Care Planning (ACP) Conversation    Date of Conversation: 12/12/2024  Conducted with: Patient with Decision Making Capacity  Other persons present: None    Healthcare Decision Maker:   Primary Decision Maker: Reagan Early - Brother/Sister - [phone]     Today we documented Decision Maker(s) consistent with Legal Next of Kin hierarchy.  Content/Action Overview:  Has NO ACP documents-Information provided  Reviewed DNR/DNI and patient elects Full Code (Attempt Resuscitation)    Mailing out ACP docs to complete    Length of Voluntary ACP Conversation in minutes:  <16 minutes (Non-Billable)    Gia Hoffman RN

## 2024-12-13 DIAGNOSIS — Z79.4 TYPE 2 DIABETES MELLITUS WITHOUT COMPLICATION, WITH LONG-TERM CURRENT USE OF INSULIN (HCC): Primary | ICD-10-CM

## 2024-12-13 DIAGNOSIS — E11.9 TYPE 2 DIABETES MELLITUS WITHOUT COMPLICATION, WITH LONG-TERM CURRENT USE OF INSULIN (HCC): Primary | ICD-10-CM

## 2024-12-16 ENCOUNTER — CARE COORDINATION (OUTPATIENT)
Dept: CARE COORDINATION | Age: 69
End: 2024-12-16

## 2024-12-16 DIAGNOSIS — I10 BENIGN ESSENTIAL HTN: ICD-10-CM

## 2024-12-16 DIAGNOSIS — I10 UNCONTROLLED HYPERTENSION: ICD-10-CM

## 2024-12-16 RX ORDER — METOPROLOL SUCCINATE 50 MG/1
TABLET, EXTENDED RELEASE ORAL
Qty: 14 TABLET | Refills: 0 | Status: SHIPPED | OUTPATIENT
Start: 2024-12-16

## 2024-12-16 NOTE — CARE COORDINATION
Ambulatory Care Coordination Note     2024 10:19 AM     Patient Current Location:  Home: [address] [place] [address] [address]  [place] [address]     ACM contacted the patient by telephone. Verified name and  with patient as identifiers.         ACM: Gia Hoffman RN     Challenges to be reviewed by the provider   Additional needs identified to be addressed with provider No  none               Method of communication with provider: chart routing.    Utilization: Patient has not had any utilization since our last call.     Care Summary Note:     Offered patient enrollment in the Remote Patient Monitoring (RPM) program for in-home monitoring: Yes, but did not enroll at this time: already monitoring with home equipment.     Assessments Completed:   No changes since last call    Medications Reviewed:   Patient denies any changes with medications and reports taking all medications as prescribed.    Advance Care Planning:   Not reviewed during this call     Care Planning:   Not completed during this call    PCP/Specialist follow up:       Follow Up:   Plan for next ACM outreach in approximately 2 weeks to complete:  - follow up appointment with dr pérez  - CE[facility] shanta Renee .   Patient  is agreeable to this plan.

## 2024-12-30 ENCOUNTER — CARE COORDINATION (OUTPATIENT)
Dept: CARE COORDINATION | Age: 69
End: 2024-12-30

## 2024-12-30 NOTE — CARE COORDINATION
Ambulatory Care Coordination Note     12/30/2024 2:20 PM     ACM outreach attempt by this ACM today to perform care management follow up . ACM was unable to reach the patient by telephone today;   left voice message requesting a return phone call to this ACM.     ACM: Gia Hoffman RN     Care Summary Note: Eye dr? [facility] ? Paper work received in mail?    PCP/Specialist follow up:       Follow Up:   Plan for next ACM outreach in approximately 1 week to complete:  - disease specific assessments  - medication review  - follow-up appointment with eye .

## 2025-01-02 DIAGNOSIS — I10 BENIGN ESSENTIAL HTN: ICD-10-CM

## 2025-01-02 DIAGNOSIS — J41.8 MIXED SIMPLE AND MUCOPURULENT CHRONIC BRONCHITIS (HCC): ICD-10-CM

## 2025-01-02 DIAGNOSIS — I10 UNCONTROLLED HYPERTENSION: ICD-10-CM

## 2025-01-02 RX ORDER — ATORVASTATIN CALCIUM 40 MG/1
TABLET, FILM COATED ORAL
Qty: 30 TABLET | Refills: 2 | Status: ON HOLD | OUTPATIENT
Start: 2025-01-02

## 2025-01-02 RX ORDER — METOPROLOL SUCCINATE 50 MG/1
TABLET, EXTENDED RELEASE ORAL
Qty: 14 TABLET | Refills: 0 | Status: ON HOLD | OUTPATIENT
Start: 2025-01-02

## 2025-01-02 RX ORDER — ALBUTEROL SULFATE 90 UG/1
INHALANT RESPIRATORY (INHALATION)
Qty: 1 EACH | Refills: 2 | Status: ON HOLD | OUTPATIENT
Start: 2025-01-02

## 2025-01-02 NOTE — TELEPHONE ENCOUNTER
Refill Request     CONFIRM preferrred pharmacy with the patient.    If Mail Order Rx - Pend for 90 day refill.      Last Seen: Last Seen Department: 10/2/2024  Last Seen by PCP: 10/2/2024    Last Written: 7/25/24    If no future appointment scheduled, route STAFF MESSAGE with patient name to the  Pool for scheduling.      Next Appointment:   No future appointments.    Message sent to  to schedule appt with patient?  NO      Requested Prescriptions     Pending Prescriptions Disp Refills    albuterol sulfate HFA (PROVENTIL;VENTOLIN;PROAIR) 108 (90 Base) MCG/ACT inhaler [Pharmacy Med Name: ALBUTEROL SULFATE HFA HFA AEROSOL SOLN]  5     Sig: INHALE TWO (2) PUFFS EVERY SIX (6) HOURS AS NEEDED FOR WHEEZING    atorvastatin (LIPITOR) 40 MG tablet [Pharmacy Med Name: ATORVASTATIN CALCIUM 40MG TABLET] 30 tablet 2     Sig: TAKE ONE (1) TABLET BY MOUTH DAILY    metoprolol succinate (TOPROL XL) 50 MG extended release tablet [Pharmacy Med Name: METOPROLOL SUCCINATE ER 50MG ER TABLET ER 24HR] 14 tablet 0     Sig: TAKE ONE (1) TABLET BY MOUTH IN MORNING

## 2025-01-04 ENCOUNTER — HOSPITAL ENCOUNTER (INPATIENT)
Age: 70
LOS: 2 days | Discharge: HOME | DRG: 872 | End: 2025-01-06
Attending: EMERGENCY MEDICINE | Admitting: STUDENT IN AN ORGANIZED HEALTH CARE EDUCATION/TRAINING PROGRAM
Payer: MEDICARE

## 2025-01-04 ENCOUNTER — APPOINTMENT (OUTPATIENT)
Dept: CT IMAGING | Age: 70
DRG: 872 | End: 2025-01-04
Payer: MEDICARE

## 2025-01-04 DIAGNOSIS — L02.215 PERINEAL ABSCESS: Primary | ICD-10-CM

## 2025-01-04 DIAGNOSIS — E08.69 DIABETES DUE TO UNDERLYING CONDITION W OTH COMPLICATION (HCC): ICD-10-CM

## 2025-01-04 DIAGNOSIS — J44.1 COPD EXACERBATION (HCC): ICD-10-CM

## 2025-01-04 PROBLEM — K61.0 PERIANAL ABSCESS: Status: ACTIVE | Noted: 2025-01-04

## 2025-01-04 LAB
ALBUMIN SERPL-MCNC: 4.4 G/DL (ref 3.4–5)
ALBUMIN/GLOB SERPL: 1 {RATIO} (ref 1.1–2.2)
ALP SERPL-CCNC: 161 U/L (ref 40–129)
ALT SERPL-CCNC: 14 U/L (ref 10–40)
ANION GAP SERPL CALCULATED.3IONS-SCNC: 13 MMOL/L (ref 3–16)
AST SERPL-CCNC: 21 U/L (ref 15–37)
BASOPHILS # BLD: 0.2 K/UL (ref 0–0.2)
BASOPHILS NFR BLD: 1.1 %
BILIRUB SERPL-MCNC: 0.6 MG/DL (ref 0–1)
BUN SERPL-MCNC: 10 MG/DL (ref 7–20)
CALCIUM SERPL-MCNC: 10.6 MG/DL (ref 8.3–10.6)
CHLORIDE SERPL-SCNC: 98 MMOL/L (ref 99–110)
CO2 SERPL-SCNC: 28 MMOL/L (ref 21–32)
CREAT SERPL-MCNC: 0.9 MG/DL (ref 0.8–1.3)
DEPRECATED RDW RBC AUTO: 14.8 % (ref 12.4–15.4)
EOSINOPHIL # BLD: 0.2 K/UL (ref 0–0.6)
EOSINOPHIL NFR BLD: 1.2 %
ERYTHROCYTE [SEDIMENTATION RATE] IN BLOOD BY WESTERGREN METHOD: 73 MM/HR (ref 0–20)
FLUAV RNA UPPER RESP QL NAA+PROBE: NEGATIVE
FLUBV AG NPH QL: NEGATIVE
GFR SERPLBLD CREATININE-BSD FMLA CKD-EPI: >90 ML/MIN/{1.73_M2}
GLUCOSE SERPL-MCNC: 119 MG/DL (ref 70–99)
HCT VFR BLD AUTO: 44.7 % (ref 40.5–52.5)
HGB BLD-MCNC: 14.6 G/DL (ref 13.5–17.5)
LACTATE BLDV-SCNC: 2 MMOL/L (ref 0.4–2)
LYMPHOCYTES # BLD: 2.2 K/UL (ref 1–5.1)
LYMPHOCYTES NFR BLD: 14.2 %
MCH RBC QN AUTO: 30.3 PG (ref 26–34)
MCHC RBC AUTO-ENTMCNC: 32.8 G/DL (ref 31–36)
MCV RBC AUTO: 92.4 FL (ref 80–100)
MONOCYTES # BLD: 1.4 K/UL (ref 0–1.3)
MONOCYTES NFR BLD: 9 %
NEUTROPHILS # BLD: 11.7 K/UL (ref 1.7–7.7)
NEUTROPHILS NFR BLD: 74.5 %
PLATELET # BLD AUTO: 283 K/UL (ref 135–450)
PMV BLD AUTO: 7.1 FL (ref 5–10.5)
POTASSIUM SERPL-SCNC: 3.7 MMOL/L (ref 3.5–5.1)
PROT SERPL-MCNC: 8.9 G/DL (ref 6.4–8.2)
RBC # BLD AUTO: 4.84 M/UL (ref 4.2–5.9)
SARS-COV-2 RDRP RESP QL NAA+PROBE: NOT DETECTED
SODIUM SERPL-SCNC: 139 MMOL/L (ref 136–145)
WBC # BLD AUTO: 15.7 K/UL (ref 4–11)

## 2025-01-04 PROCEDURE — 6360000002 HC RX W HCPCS: Performed by: EMERGENCY MEDICINE

## 2025-01-04 PROCEDURE — 96365 THER/PROPH/DIAG IV INF INIT: CPT

## 2025-01-04 PROCEDURE — 74177 CT ABD & PELVIS W/CONTRAST: CPT

## 2025-01-04 PROCEDURE — 87804 INFLUENZA ASSAY W/OPTIC: CPT

## 2025-01-04 PROCEDURE — 85652 RBC SED RATE AUTOMATED: CPT

## 2025-01-04 PROCEDURE — 2580000003 HC RX 258: Performed by: EMERGENCY MEDICINE

## 2025-01-04 PROCEDURE — 6360000004 HC RX CONTRAST MEDICATION: Performed by: EMERGENCY MEDICINE

## 2025-01-04 PROCEDURE — 87040 BLOOD CULTURE FOR BACTERIA: CPT

## 2025-01-04 PROCEDURE — 6370000000 HC RX 637 (ALT 250 FOR IP): Performed by: EMERGENCY MEDICINE

## 2025-01-04 PROCEDURE — 85025 COMPLETE CBC W/AUTO DIFF WBC: CPT

## 2025-01-04 PROCEDURE — 99285 EMERGENCY DEPT VISIT HI MDM: CPT

## 2025-01-04 PROCEDURE — 80053 COMPREHEN METABOLIC PANEL: CPT

## 2025-01-04 PROCEDURE — 36415 COLL VENOUS BLD VENIPUNCTURE: CPT

## 2025-01-04 PROCEDURE — 1200000000 HC SEMI PRIVATE

## 2025-01-04 PROCEDURE — 83605 ASSAY OF LACTIC ACID: CPT

## 2025-01-04 PROCEDURE — 87635 SARS-COV-2 COVID-19 AMP PRB: CPT

## 2025-01-04 PROCEDURE — 96367 TX/PROPH/DG ADDL SEQ IV INF: CPT

## 2025-01-04 RX ORDER — INSULIN LISPRO 100 [IU]/ML
0-4 INJECTION, SOLUTION INTRAVENOUS; SUBCUTANEOUS EVERY 6 HOURS SCHEDULED
Status: DISCONTINUED | OUTPATIENT
Start: 2025-01-05 | End: 2025-01-06

## 2025-01-04 RX ORDER — POTASSIUM CHLORIDE 750 MG/1
40 TABLET, EXTENDED RELEASE ORAL PRN
Status: DISCONTINUED | OUTPATIENT
Start: 2025-01-04 | End: 2025-01-06 | Stop reason: HOSPADM

## 2025-01-04 RX ORDER — MAGNESIUM SULFATE IN WATER 40 MG/ML
2000 INJECTION, SOLUTION INTRAVENOUS PRN
Status: DISCONTINUED | OUTPATIENT
Start: 2025-01-04 | End: 2025-01-06 | Stop reason: HOSPADM

## 2025-01-04 RX ORDER — ONDANSETRON 4 MG/1
4 TABLET, ORALLY DISINTEGRATING ORAL EVERY 8 HOURS PRN
Status: DISCONTINUED | OUTPATIENT
Start: 2025-01-04 | End: 2025-01-06 | Stop reason: HOSPADM

## 2025-01-04 RX ORDER — IOPAMIDOL 755 MG/ML
75 INJECTION, SOLUTION INTRAVASCULAR
Status: COMPLETED | OUTPATIENT
Start: 2025-01-04 | End: 2025-01-04

## 2025-01-04 RX ORDER — POLYETHYLENE GLYCOL 3350 17 G/17G
17 POWDER, FOR SOLUTION ORAL DAILY PRN
Status: DISCONTINUED | OUTPATIENT
Start: 2025-01-04 | End: 2025-01-06 | Stop reason: HOSPADM

## 2025-01-04 RX ORDER — METRONIDAZOLE 500 MG/100ML
500 INJECTION, SOLUTION INTRAVENOUS ONCE
Status: COMPLETED | OUTPATIENT
Start: 2025-01-04 | End: 2025-01-04

## 2025-01-04 RX ORDER — SODIUM CHLORIDE 0.9 % (FLUSH) 0.9 %
5-40 SYRINGE (ML) INJECTION PRN
Status: DISCONTINUED | OUTPATIENT
Start: 2025-01-04 | End: 2025-01-06 | Stop reason: HOSPADM

## 2025-01-04 RX ORDER — SODIUM CHLORIDE 9 MG/ML
INJECTION, SOLUTION INTRAVENOUS PRN
Status: DISCONTINUED | OUTPATIENT
Start: 2025-01-04 | End: 2025-01-06 | Stop reason: HOSPADM

## 2025-01-04 RX ORDER — ACETAMINOPHEN 650 MG/1
650 SUPPOSITORY RECTAL EVERY 6 HOURS PRN
Status: DISCONTINUED | OUTPATIENT
Start: 2025-01-04 | End: 2025-01-06 | Stop reason: HOSPADM

## 2025-01-04 RX ORDER — DEXTROSE MONOHYDRATE 100 MG/ML
INJECTION, SOLUTION INTRAVENOUS CONTINUOUS PRN
Status: DISCONTINUED | OUTPATIENT
Start: 2025-01-04 | End: 2025-01-06 | Stop reason: HOSPADM

## 2025-01-04 RX ORDER — ACETAMINOPHEN 325 MG/1
650 TABLET ORAL EVERY 6 HOURS PRN
Status: DISCONTINUED | OUTPATIENT
Start: 2025-01-04 | End: 2025-01-06 | Stop reason: HOSPADM

## 2025-01-04 RX ORDER — POTASSIUM CHLORIDE 7.45 MG/ML
10 INJECTION INTRAVENOUS PRN
Status: DISCONTINUED | OUTPATIENT
Start: 2025-01-04 | End: 2025-01-06 | Stop reason: HOSPADM

## 2025-01-04 RX ORDER — ONDANSETRON 2 MG/ML
4 INJECTION INTRAMUSCULAR; INTRAVENOUS EVERY 6 HOURS PRN
Status: DISCONTINUED | OUTPATIENT
Start: 2025-01-04 | End: 2025-01-06 | Stop reason: HOSPADM

## 2025-01-04 RX ORDER — INSULIN LISPRO 100 [IU]/ML
0-4 INJECTION, SOLUTION INTRAVENOUS; SUBCUTANEOUS
Status: DISCONTINUED | OUTPATIENT
Start: 2025-01-04 | End: 2025-01-06 | Stop reason: HOSPADM

## 2025-01-04 RX ORDER — GLUCAGON 1 MG/ML
1 KIT INJECTION PRN
Status: DISCONTINUED | OUTPATIENT
Start: 2025-01-04 | End: 2025-01-06 | Stop reason: HOSPADM

## 2025-01-04 RX ORDER — SODIUM CHLORIDE 0.9 % (FLUSH) 0.9 %
5-40 SYRINGE (ML) INJECTION EVERY 12 HOURS SCHEDULED
Status: DISCONTINUED | OUTPATIENT
Start: 2025-01-04 | End: 2025-01-06 | Stop reason: HOSPADM

## 2025-01-04 RX ADMIN — LIDOCAINE HYDROCHLORIDE: 20 SOLUTION ORAL at 21:32

## 2025-01-04 RX ADMIN — PIPERACILLIN AND TAZOBACTAM 3375 MG: 3; .375 INJECTION, POWDER, LYOPHILIZED, FOR SOLUTION INTRAVENOUS at 20:24

## 2025-01-04 RX ADMIN — VANCOMYCIN HYDROCHLORIDE 1000 MG: 1 INJECTION, POWDER, LYOPHILIZED, FOR SOLUTION INTRAVENOUS at 22:03

## 2025-01-04 RX ADMIN — METRONIDAZOLE 500 MG: 500 INJECTION, SOLUTION INTRAVENOUS at 20:58

## 2025-01-04 RX ADMIN — IOPAMIDOL 75 ML: 755 INJECTION, SOLUTION INTRAVENOUS at 19:26

## 2025-01-04 ASSESSMENT — PAIN DESCRIPTION - DESCRIPTORS: DESCRIPTORS: ACHING;SORE

## 2025-01-04 ASSESSMENT — LIFESTYLE VARIABLES
HOW MANY STANDARD DRINKS CONTAINING ALCOHOL DO YOU HAVE ON A TYPICAL DAY: 1 OR 2
HOW OFTEN DO YOU HAVE A DRINK CONTAINING ALCOHOL: MONTHLY OR LESS

## 2025-01-04 ASSESSMENT — PAIN - FUNCTIONAL ASSESSMENT: PAIN_FUNCTIONAL_ASSESSMENT: 0-10

## 2025-01-04 ASSESSMENT — PAIN SCALES - GENERAL: PAINLEVEL_OUTOF10: 5

## 2025-01-04 ASSESSMENT — PAIN DESCRIPTION - LOCATION: LOCATION: GROIN

## 2025-01-04 NOTE — ED TRIAGE NOTES
Pt presents with c/o boil in groin since yesterday, hx of surgery x 1 month ago in same area, states he did a lot of walking yesterday.

## 2025-01-05 PROBLEM — K61.0 PERIANAL ABSCESS: Status: RESOLVED | Noted: 2025-01-04 | Resolved: 2025-01-05

## 2025-01-05 PROBLEM — E11.65 TYPE 2 DIABETES MELLITUS WITH HYPERGLYCEMIA, WITH LONG-TERM CURRENT USE OF INSULIN (HCC): Status: ACTIVE | Noted: 2025-01-05

## 2025-01-05 PROBLEM — Z79.4 TYPE 2 DIABETES MELLITUS WITH HYPERGLYCEMIA, WITH LONG-TERM CURRENT USE OF INSULIN (HCC): Status: ACTIVE | Noted: 2025-01-05

## 2025-01-05 PROBLEM — E78.5 HYPERLIPIDEMIA: Status: ACTIVE | Noted: 2025-01-05

## 2025-01-05 LAB
BASOPHILS # BLD: 0.1 K/UL (ref 0–0.2)
BASOPHILS NFR BLD: 0.6 %
DEPRECATED RDW RBC AUTO: 14.9 % (ref 12.4–15.4)
EKG ATRIAL RATE: 80 BPM
EKG DIAGNOSIS: NORMAL
EKG P AXIS: 76 DEGREES
EKG P-R INTERVAL: 234 MS
EKG Q-T INTERVAL: 404 MS
EKG QRS DURATION: 98 MS
EKG QTC CALCULATION (BAZETT): 465 MS
EKG R AXIS: -49 DEGREES
EKG T AXIS: 71 DEGREES
EKG VENTRICULAR RATE: 80 BPM
EOSINOPHIL # BLD: 0.2 K/UL (ref 0–0.6)
EOSINOPHIL NFR BLD: 1.9 %
GLUCOSE BLD-MCNC: 106 MG/DL (ref 70–99)
GLUCOSE BLD-MCNC: 110 MG/DL (ref 70–99)
GLUCOSE BLD-MCNC: 119 MG/DL (ref 70–99)
GLUCOSE BLD-MCNC: 127 MG/DL (ref 70–99)
GLUCOSE BLD-MCNC: 134 MG/DL (ref 70–99)
HCT VFR BLD AUTO: 40.2 % (ref 40.5–52.5)
HGB BLD-MCNC: 13.4 G/DL (ref 13.5–17.5)
LYMPHOCYTES # BLD: 2.4 K/UL (ref 1–5.1)
LYMPHOCYTES NFR BLD: 21.1 %
MCH RBC QN AUTO: 30.9 PG (ref 26–34)
MCHC RBC AUTO-ENTMCNC: 33.3 G/DL (ref 31–36)
MCV RBC AUTO: 92.9 FL (ref 80–100)
MONOCYTES # BLD: 1.1 K/UL (ref 0–1.3)
MONOCYTES NFR BLD: 9.3 %
NEUTROPHILS # BLD: 7.6 K/UL (ref 1.7–7.7)
NEUTROPHILS NFR BLD: 67.1 %
PERFORMED ON: ABNORMAL
PLATELET # BLD AUTO: 228 K/UL (ref 135–450)
PMV BLD AUTO: 7.1 FL (ref 5–10.5)
RBC # BLD AUTO: 4.33 M/UL (ref 4.2–5.9)
WBC # BLD AUTO: 11.3 K/UL (ref 4–11)

## 2025-01-05 PROCEDURE — 80053 COMPREHEN METABOLIC PANEL: CPT

## 2025-01-05 PROCEDURE — 2700000000 HC OXYGEN THERAPY PER DAY

## 2025-01-05 PROCEDURE — 36415 COLL VENOUS BLD VENIPUNCTURE: CPT

## 2025-01-05 PROCEDURE — 93005 ELECTROCARDIOGRAM TRACING: CPT | Performed by: STUDENT IN AN ORGANIZED HEALTH CARE EDUCATION/TRAINING PROGRAM

## 2025-01-05 PROCEDURE — 6360000002 HC RX W HCPCS: Performed by: STUDENT IN AN ORGANIZED HEALTH CARE EDUCATION/TRAINING PROGRAM

## 2025-01-05 PROCEDURE — 83036 HEMOGLOBIN GLYCOSYLATED A1C: CPT

## 2025-01-05 PROCEDURE — 1200000000 HC SEMI PRIVATE

## 2025-01-05 PROCEDURE — 94761 N-INVAS EAR/PLS OXIMETRY MLT: CPT

## 2025-01-05 PROCEDURE — APPSS30 APP SPLIT SHARED TIME 16-30 MINUTES

## 2025-01-05 PROCEDURE — 55100 DRAINAGE OF SCROTUM ABSCESS: CPT | Performed by: SURGERY

## 2025-01-05 PROCEDURE — 87205 SMEAR GRAM STAIN: CPT

## 2025-01-05 PROCEDURE — 6360000002 HC RX W HCPCS: Performed by: SURGERY

## 2025-01-05 PROCEDURE — 6370000000 HC RX 637 (ALT 250 FOR IP): Performed by: STUDENT IN AN ORGANIZED HEALTH CARE EDUCATION/TRAINING PROGRAM

## 2025-01-05 PROCEDURE — 99222 1ST HOSP IP/OBS MODERATE 55: CPT | Performed by: SURGERY

## 2025-01-05 PROCEDURE — 85025 COMPLETE CBC W/AUTO DIFF WBC: CPT

## 2025-01-05 PROCEDURE — 6370000000 HC RX 637 (ALT 250 FOR IP): Performed by: INTERNAL MEDICINE

## 2025-01-05 PROCEDURE — 0V95XZZ DRAINAGE OF SCROTUM, EXTERNAL APPROACH: ICD-10-PCS | Performed by: SURGERY

## 2025-01-05 PROCEDURE — 87070 CULTURE OTHR SPECIMN AEROBIC: CPT

## 2025-01-05 PROCEDURE — 93010 ELECTROCARDIOGRAM REPORT: CPT | Performed by: INTERNAL MEDICINE

## 2025-01-05 PROCEDURE — 2580000003 HC RX 258: Performed by: STUDENT IN AN ORGANIZED HEALTH CARE EDUCATION/TRAINING PROGRAM

## 2025-01-05 PROCEDURE — 94640 AIRWAY INHALATION TREATMENT: CPT

## 2025-01-05 PROCEDURE — 87075 CULTR BACTERIA EXCEPT BLOOD: CPT

## 2025-01-05 PROCEDURE — 99232 SBSQ HOSP IP/OBS MODERATE 35: CPT | Performed by: INTERNAL MEDICINE

## 2025-01-05 PROCEDURE — 2500000003 HC RX 250 WO HCPCS: Performed by: STUDENT IN AN ORGANIZED HEALTH CARE EDUCATION/TRAINING PROGRAM

## 2025-01-05 PROCEDURE — 6360000002 HC RX W HCPCS

## 2025-01-05 RX ORDER — OXYCODONE HYDROCHLORIDE 5 MG/1
10 TABLET ORAL EVERY 4 HOURS PRN
Status: DISCONTINUED | OUTPATIENT
Start: 2025-01-05 | End: 2025-01-06 | Stop reason: HOSPADM

## 2025-01-05 RX ORDER — OXYCODONE HYDROCHLORIDE 5 MG/1
5 TABLET ORAL EVERY 4 HOURS PRN
Status: DISCONTINUED | OUTPATIENT
Start: 2025-01-05 | End: 2025-01-06 | Stop reason: HOSPADM

## 2025-01-05 RX ORDER — BUDESONIDE AND FORMOTEROL FUMARATE DIHYDRATE 160; 4.5 UG/1; UG/1
2 AEROSOL RESPIRATORY (INHALATION)
Status: DISCONTINUED | OUTPATIENT
Start: 2025-01-05 | End: 2025-01-05 | Stop reason: ALTCHOICE

## 2025-01-05 RX ORDER — CALCIUM CARBONATE 500 MG/1
500 TABLET, CHEWABLE ORAL 3 TIMES DAILY PRN
Status: DISCONTINUED | OUTPATIENT
Start: 2025-01-05 | End: 2025-01-06 | Stop reason: HOSPADM

## 2025-01-05 RX ORDER — FUROSEMIDE 20 MG/1
20 TABLET ORAL DAILY
Status: DISCONTINUED | OUTPATIENT
Start: 2025-01-05 | End: 2025-01-06 | Stop reason: HOSPADM

## 2025-01-05 RX ORDER — INSULIN GLARGINE 100 [IU]/ML
15 INJECTION, SOLUTION SUBCUTANEOUS NIGHTLY
Status: DISCONTINUED | OUTPATIENT
Start: 2025-01-05 | End: 2025-01-06 | Stop reason: HOSPADM

## 2025-01-05 RX ORDER — BUDESONIDE AND FORMOTEROL FUMARATE DIHYDRATE 160; 4.5 UG/1; UG/1
2 AEROSOL RESPIRATORY (INHALATION)
Status: DISCONTINUED | OUTPATIENT
Start: 2025-01-05 | End: 2025-01-06 | Stop reason: HOSPADM

## 2025-01-05 RX ORDER — GABAPENTIN 400 MG/1
800 CAPSULE ORAL 4 TIMES DAILY
Status: DISCONTINUED | OUTPATIENT
Start: 2025-01-05 | End: 2025-01-06 | Stop reason: HOSPADM

## 2025-01-05 RX ORDER — ONDANSETRON 4 MG/1
4 TABLET, FILM COATED ORAL EVERY 8 HOURS PRN
Status: DISCONTINUED | OUTPATIENT
Start: 2025-01-05 | End: 2025-01-05 | Stop reason: SDUPTHER

## 2025-01-05 RX ORDER — DULOXETIN HYDROCHLORIDE 60 MG/1
60 CAPSULE, DELAYED RELEASE ORAL DAILY
Status: DISCONTINUED | OUTPATIENT
Start: 2025-01-05 | End: 2025-01-06 | Stop reason: HOSPADM

## 2025-01-05 RX ORDER — METOPROLOL SUCCINATE 25 MG/1
25 TABLET, EXTENDED RELEASE ORAL DAILY
Status: DISCONTINUED | OUTPATIENT
Start: 2025-01-05 | End: 2025-01-06 | Stop reason: HOSPADM

## 2025-01-05 RX ORDER — DILTIAZEM HYDROCHLORIDE 180 MG/1
180 CAPSULE, COATED, EXTENDED RELEASE ORAL DAILY
Status: DISCONTINUED | OUTPATIENT
Start: 2025-01-05 | End: 2025-01-06 | Stop reason: HOSPADM

## 2025-01-05 RX ORDER — ALLOPURINOL 300 MG/1
300 TABLET ORAL DAILY
Status: DISCONTINUED | OUTPATIENT
Start: 2025-01-05 | End: 2025-01-06 | Stop reason: HOSPADM

## 2025-01-05 RX ORDER — FERROUS SULFATE 325(65) MG
325 TABLET ORAL
Status: DISCONTINUED | OUTPATIENT
Start: 2025-01-05 | End: 2025-01-06 | Stop reason: HOSPADM

## 2025-01-05 RX ORDER — ATORVASTATIN CALCIUM 40 MG/1
40 TABLET, FILM COATED ORAL DAILY
Status: DISCONTINUED | OUTPATIENT
Start: 2025-01-05 | End: 2025-01-06 | Stop reason: HOSPADM

## 2025-01-05 RX ORDER — ENOXAPARIN SODIUM 100 MG/ML
40 INJECTION SUBCUTANEOUS DAILY
Status: DISCONTINUED | OUTPATIENT
Start: 2025-01-05 | End: 2025-01-06 | Stop reason: HOSPADM

## 2025-01-05 RX ORDER — FOLIC ACID 1 MG/1
1000 TABLET ORAL DAILY
Status: DISCONTINUED | OUTPATIENT
Start: 2025-01-05 | End: 2025-01-06 | Stop reason: HOSPADM

## 2025-01-05 RX ADMIN — CHOLECALCIFEROL TAB 125 MCG (5000 UNIT) 5000 UNITS: 125 TAB at 09:55

## 2025-01-05 RX ADMIN — PIPERACILLIN AND TAZOBACTAM 3375 MG: 3; .375 INJECTION, POWDER, LYOPHILIZED, FOR SOLUTION INTRAVENOUS at 17:37

## 2025-01-05 RX ADMIN — DULOXETINE 60 MG: 60 CAPSULE, DELAYED RELEASE ORAL at 09:55

## 2025-01-05 RX ADMIN — FOLIC ACID 1000 MCG: 1 TABLET ORAL at 09:55

## 2025-01-05 RX ADMIN — METOPROLOL SUCCINATE 25 MG: 25 TABLET, EXTENDED RELEASE ORAL at 09:55

## 2025-01-05 RX ADMIN — GABAPENTIN 800 MG: 400 CAPSULE ORAL at 09:54

## 2025-01-05 RX ADMIN — LIDOCAINE HYDROCHLORIDE 5 ML: 10 INJECTION, SOLUTION INFILTRATION; PERINEURAL at 12:45

## 2025-01-05 RX ADMIN — SODIUM CHLORIDE, PRESERVATIVE FREE 10 ML: 5 INJECTION INTRAVENOUS at 22:08

## 2025-01-05 RX ADMIN — INSULIN GLARGINE 15 UNITS: 100 INJECTION, SOLUTION SUBCUTANEOUS at 22:03

## 2025-01-05 RX ADMIN — GABAPENTIN 800 MG: 400 CAPSULE ORAL at 22:03

## 2025-01-05 RX ADMIN — GABAPENTIN 800 MG: 400 CAPSULE ORAL at 14:12

## 2025-01-05 RX ADMIN — FUROSEMIDE 20 MG: 20 TABLET ORAL at 16:01

## 2025-01-05 RX ADMIN — SODIUM CHLORIDE, PRESERVATIVE FREE 10 ML: 5 INJECTION INTRAVENOUS at 03:54

## 2025-01-05 RX ADMIN — PIPERACILLIN AND TAZOBACTAM 3375 MG: 3; .375 INJECTION, POWDER, LYOPHILIZED, FOR SOLUTION INTRAVENOUS at 10:05

## 2025-01-05 RX ADMIN — ALLOPURINOL 300 MG: 300 TABLET ORAL at 09:55

## 2025-01-05 RX ADMIN — DILTIAZEM HYDROCHLORIDE 180 MG: 180 CAPSULE, COATED, EXTENDED RELEASE ORAL at 09:55

## 2025-01-05 RX ADMIN — SODIUM CHLORIDE, PRESERVATIVE FREE 10 ML: 5 INJECTION INTRAVENOUS at 09:54

## 2025-01-05 RX ADMIN — ENOXAPARIN SODIUM 40 MG: 100 INJECTION SUBCUTANEOUS at 11:32

## 2025-01-05 RX ADMIN — ATORVASTATIN CALCIUM 40 MG: 40 TABLET, FILM COATED ORAL at 09:55

## 2025-01-05 RX ADMIN — BUDESONIDE AND FORMOTEROL FUMARATE DIHYDRATE 2 PUFF: 160; 4.5 AEROSOL RESPIRATORY (INHALATION) at 21:18

## 2025-01-05 RX ADMIN — BUDESONIDE AND FORMOTEROL FUMARATE DIHYDRATE 2 PUFF: 160; 4.5 AEROSOL RESPIRATORY (INHALATION) at 08:07

## 2025-01-05 RX ADMIN — PIPERACILLIN AND TAZOBACTAM 3375 MG: 3; .375 INJECTION, POWDER, LYOPHILIZED, FOR SOLUTION INTRAVENOUS at 03:52

## 2025-01-05 RX ADMIN — GABAPENTIN 800 MG: 400 CAPSULE ORAL at 17:01

## 2025-01-05 RX ADMIN — FERROUS SULFATE TAB 325 MG (65 MG ELEMENTAL FE) 325 MG: 325 (65 FE) TAB at 16:01

## 2025-01-05 NOTE — H&P
Hospital Medicine History & Physical      Date of Admission: 1/4/2025    Date of Service:  Pt seen/examined on 1/4/2025    [x]Admitted to Inpatient with expected LOS greater than two midnights due to medical therapy.  []Placed in Observation status.    Chief Admission Complaint: Perineal abscess    Presenting Admission History:      69 y.o. male with past medical history of gout, hyperlipidemia, depression, COPD, type 2 diabetes, neuropathy, hypertension presented to [facility] as transfer from [facility] ED where he presented with chief complaint of perineal pain/abscess.  Patient recently had I&D of perineal abscess about a month ago and states that he had recovered.  States that he wore jeans without underwear recently and noticed that he had a lot of pain and swelling in that area.  Given his history of prior abscess he came into emergency department for evaluation.  Patient denies any headache, dizziness, fever, chills, GI symptoms.  Patient denies lower extremity swelling.  Assessment/Plan:      Current Principal Problem:  Perianal abscess    Perineal abscess  - Zosyn, n.p.o., general surgery consultation    Gout  - Allopurinol    Depression  - Cymbalta    Hyperlipidemia  - Lipitor    Hypertension  - Cardizem, metoprolol    Type 2 diabetes  - Lantus, sliding scale insulin, carb controlled diet, BG ACHS    Discussed management and the need for Hospitalization of the patient w/ the Emergency Department Provider: Saleem    CXR: I have reviewed the CXR with the following interpretation:   EKG:  I have reviewed the EKG with the following interpretation: Pending    Physical Exam Performed:      /84   Pulse 82   Temp 98.2 °F (36.8 °C) (Oral)   Resp 18   Ht 1.702 m (5' 7\")   Wt 99.3 kg (219 lb)   SpO2 96%   BMI 34.30 kg/m²     General appearance: Appears uncomfortable  HEENT:  Pupils equal, round, and reactive to light. Conjunctivae/corneas clear.  Respiratory: Diminished bilaterally, no

## 2025-01-05 NOTE — PLAN OF CARE
Problem: Chronic Conditions and Co-morbidities  Goal: Patient's chronic conditions and co-morbidity symptoms are monitored and maintained or improved  Outcome: Progressing  Flowsheets (Taken 1/5/2025 0000)  Care Plan - Patient's Chronic Conditions and Co-Morbidity Symptoms are Monitored and Maintained or Improved: Collaborate with multidisciplinary team to address chronic and comorbid conditions and prevent exacerbation or deterioration     Problem: Discharge Planning  Goal: Discharge to home or other facility with appropriate resources  Outcome: Progressing  Flowsheets (Taken 1/5/2025 0000)  Discharge to home or other facility with appropriate resources: Identify barriers to discharge with patient and caregiver     Problem: Safety - Adult  Goal: Free from fall injury  Outcome: Progressing     Problem: ABCDS Injury Assessment  Goal: Absence of physical injury  Outcome: Progressing

## 2025-01-05 NOTE — FLOWSHEET NOTE
Pt a/o. Am assessment completed see flow sheet. Pt denies any pain/ needs at this time. Call light within reach.      01/05/25 0842   Vital Signs   Temp 97.9 °F (36.6 °C)   Temp Source Oral   Pulse 79   Heart Rate Source Monitor   Respirations 18   /65   MAP (Calculated) 81   BP Location Left upper arm   BP Method Automatic   Patient Position Semi fowlers   Pain Assessment   Pain Assessment None - Denies Pain   Oxygen Therapy   SpO2 99 %   O2 Device Nasal cannula   O2 Flow Rate (L/min) 4 L/min     Medications given as ordered.

## 2025-01-05 NOTE — CONSULTS
glargine  15 Units SubCUTAneous Nightly    metoprolol succinate  25 mg Oral Daily    budesonide-formoterol  2 puff Inhalation BID RT    piperacillin-tazobactam  3,375 mg IntraVENous Q8H    sodium chloride flush  5-40 mL IntraVENous 2 times per day    insulin lispro  0-4 Units SubCUTAneous 4x Daily AC & HS    insulin lispro  0-4 Units SubCUTAneous 4 times per day     Continuous Infusions:   sodium chloride      dextrose       PRN Meds:.calcium carbonate, HYDROmorphone, sodium chloride flush, sodium chloride, potassium chloride **OR** potassium alternative oral replacement **OR** potassium chloride, magnesium sulfate, ondansetron **OR** ondansetron, polyethylene glycol, acetaminophen **OR** acetaminophen, glucose, dextrose bolus **OR** dextrose bolus, glucagon (rDNA), dextrose    Allergies  is allergic to lisinopril, ace inhibitors, influenza vaccines, tiotropium bromide monohydrate, and vilanterol.    Family History  family history includes Cancer in his brother, mother, and another family member; Diabetes in his sister; Emphysema in his father; Hypertension in his sister.    Social History   reports that he has been smoking cigarettes and cigars. He started smoking about 52 years ago. He has a 44.5 pack-year smoking history. He has quit using smokeless tobacco.  His smokeless tobacco use included snuff. He reports current alcohol use of about 8.0 standard drinks of alcohol per week. He reports that he does not use drugs.    Review of Systems:    Constitutional: Negative for fever   HENT: Negative for sore throat   Eyes: Negative for redness   Respiratory: +dyspnea worse than baseline, +productive cough   Cardiovascular: Negative for chest pain   Gastrointestinal: Negative for vomiting, diarrhea   Genitourinary: Negative for hematuria   Musculoskeletal: Negative for arthralgias   Skin: Negative for rash +perineal abscess  Neurological: Negative for syncope   Hematological: Negative for adenopathy

## 2025-01-05 NOTE — PLAN OF CARE
Problem: Safety - Adult  Goal: Free from fall injury  1/5/2025 1531 by Sara Dodson, RN  Outcome: Progressing   Patient able to make needs known, refusing bed alarm.  Patient using call light when assistance needed.

## 2025-01-05 NOTE — ED NOTES
Pt stated he has over $5,000 cash in his jacket, pt stating he does not trust having it at home when he is not there. Pt requesting for security at [place] to lock it up when he arrives, Clinical Alejandra notified.

## 2025-01-05 NOTE — ED PROVIDER NOTES
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) 01/10/2022    Polyp of colon     Rectal bleeding     Seizures (HCC)     ongoing, began after swine flu vaccination    Severe claudication (HCC) 01/24/2020    Ulnar nerve entrapment 02/09/2016    Uncontrolled hypertension 11/12/2019     Past Surgical History:   Procedure Laterality Date    ANKLE FRACTURE SURGERY Left 2/12/2024    OPEN REDUCTION INTERNAL FIXATION OF LEFT ANKLE FRACTURE DISLOCATION WITH SYNDESMOSIS REPAIR performed by Sudeep Ruth MD at [facility] OR    AORTO-ILIAC BYPASS GRAFT N/A 08/24/2020    AORTOBIFEMORAL BYPASS GRAFT performed by Sam Fair MD at [facility] OR    BRONCHOSCOPY  02/07/2011    bronch with EBUS    CATARACT REMOVAL Left     COLONOSCOPY N/A 07/23/2018    COLONOSCOPY WITH BIOPSY performed by Maicol Fournier MD at [facility] ENDOSCOPY    COLONOSCOPY N/A 07/23/2018    COLONOSCOPY POLYPECTOMY SNARE/COLD BIOPSY performed by Maicol Fournier MD at [facility] ENDOSCOPY    COLONOSCOPY N/A 05/10/2022    COLON W/ANES. performed by Diana Gifford MD at [facility] ENDOSCOPY    CYSTOSCOPY  02/04/2015    Urethral Dilatation, Resection of Bladder Tumor    CYSTOSCOPY  05/06/2014    w/ bladder biopsy    CYSTOSCOPY  09/09/2015    w/ urethral dilatation, bladder tumor follow up    CYSTOSCOPY  02/24/2016    fulgeration of bladder tumor    ELBOW SURGERY Left     ENDOSCOPY, SMALL BOWEL N/A 02/20/2019    BOWEL SMALL CAPSULE ENDOSCOPY performed by Maicol Fournier MD at [facility] ENDOSCOPY    PERINEAL SURGERY N/A 10/22/2024    PERINEAL INCISION AND DRAINAGE performed by Samuel Velasquez MD at [facility] OR    UPPER GASTROINTESTINAL ENDOSCOPY N/A 07/23/2018    EGD BIOPSY performed by Maicol Fournier MD at [facility] ENDOSCOPY    UPPER GASTROINTESTINAL ENDOSCOPY N/A 05/09/2022    EGD BIOPSY performed by Diana Gifford MD at [facility] ENDOSCOPY     Family History   Problem Relation Age of Onset    Cancer Mother         Lung

## 2025-01-06 VITALS
DIASTOLIC BLOOD PRESSURE: 78 MMHG | HEART RATE: 84 BPM | RESPIRATION RATE: 18 BRPM | HEIGHT: 67 IN | BODY MASS INDEX: 34.37 KG/M2 | SYSTOLIC BLOOD PRESSURE: 136 MMHG | TEMPERATURE: 98.2 F | WEIGHT: 219 LBS | OXYGEN SATURATION: 100 %

## 2025-01-06 PROBLEM — E08.69 DIABETES DUE TO UNDERLYING CONDITION W OTH COMPLICATION (HCC): Status: ACTIVE | Noted: 2025-01-06

## 2025-01-06 LAB
ALBUMIN SERPL-MCNC: 3.3 G/DL (ref 3.4–5)
ALBUMIN SERPL-MCNC: 3.4 G/DL (ref 3.4–5)
ALBUMIN/GLOB SERPL: 1 {RATIO} (ref 1.1–2.2)
ALBUMIN/GLOB SERPL: 1 {RATIO} (ref 1.1–2.2)
ALP SERPL-CCNC: 121 U/L (ref 40–129)
ALP SERPL-CCNC: 123 U/L (ref 40–129)
ALT SERPL-CCNC: 12 U/L (ref 10–40)
ALT SERPL-CCNC: 8 U/L (ref 10–40)
ANION GAP SERPL CALCULATED.3IONS-SCNC: 11 MMOL/L (ref 3–16)
ANION GAP SERPL CALCULATED.3IONS-SCNC: 14 MMOL/L (ref 3–16)
AST SERPL-CCNC: 15 U/L (ref 15–37)
AST SERPL-CCNC: 20 U/L (ref 15–37)
BASOPHILS # BLD: 0 K/UL (ref 0–0.2)
BASOPHILS NFR BLD: 0.6 %
BILIRUB SERPL-MCNC: 0.3 MG/DL (ref 0–1)
BILIRUB SERPL-MCNC: 0.5 MG/DL (ref 0–1)
BUN SERPL-MCNC: 11 MG/DL (ref 7–20)
BUN SERPL-MCNC: 11 MG/DL (ref 7–20)
CALCIUM SERPL-MCNC: 9 MG/DL (ref 8.3–10.6)
CALCIUM SERPL-MCNC: 9.1 MG/DL (ref 8.3–10.6)
CHLORIDE SERPL-SCNC: 101 MMOL/L (ref 99–110)
CHLORIDE SERPL-SCNC: 101 MMOL/L (ref 99–110)
CO2 SERPL-SCNC: 25 MMOL/L (ref 21–32)
CO2 SERPL-SCNC: 29 MMOL/L (ref 21–32)
CREAT SERPL-MCNC: 0.9 MG/DL (ref 0.8–1.3)
CREAT SERPL-MCNC: 0.9 MG/DL (ref 0.8–1.3)
DEPRECATED RDW RBC AUTO: 15.2 % (ref 12.4–15.4)
EOSINOPHIL # BLD: 0.2 K/UL (ref 0–0.6)
EOSINOPHIL NFR BLD: 3.2 %
EST. AVERAGE GLUCOSE BLD GHB EST-MCNC: 119.8 MG/DL
GFR SERPLBLD CREATININE-BSD FMLA CKD-EPI: >90 ML/MIN/{1.73_M2}
GFR SERPLBLD CREATININE-BSD FMLA CKD-EPI: >90 ML/MIN/{1.73_M2}
GLUCOSE BLD-MCNC: 109 MG/DL (ref 70–99)
GLUCOSE BLD-MCNC: 155 MG/DL (ref 70–99)
GLUCOSE BLD-MCNC: 184 MG/DL (ref 70–99)
GLUCOSE SERPL-MCNC: 81 MG/DL (ref 70–99)
GLUCOSE SERPL-MCNC: 93 MG/DL (ref 70–99)
HBA1C MFR BLD: 5.8 %
HCT VFR BLD AUTO: 38.4 % (ref 40.5–52.5)
HGB BLD-MCNC: 12.9 G/DL (ref 13.5–17.5)
LYMPHOCYTES # BLD: 2.3 K/UL (ref 1–5.1)
LYMPHOCYTES NFR BLD: 29.7 %
MCH RBC QN AUTO: 31.2 PG (ref 26–34)
MCHC RBC AUTO-ENTMCNC: 33.6 G/DL (ref 31–36)
MCV RBC AUTO: 93 FL (ref 80–100)
MONOCYTES # BLD: 0.7 K/UL (ref 0–1.3)
MONOCYTES NFR BLD: 9.2 %
NEUTROPHILS # BLD: 4.4 K/UL (ref 1.7–7.7)
NEUTROPHILS NFR BLD: 57.3 %
PERFORMED ON: ABNORMAL
PLATELET # BLD AUTO: 232 K/UL (ref 135–450)
PMV BLD AUTO: 7.6 FL (ref 5–10.5)
POTASSIUM SERPL-SCNC: 3.6 MMOL/L (ref 3.5–5.1)
POTASSIUM SERPL-SCNC: 3.6 MMOL/L (ref 3.5–5.1)
PROT SERPL-MCNC: 6.6 G/DL (ref 6.4–8.2)
PROT SERPL-MCNC: 6.7 G/DL (ref 6.4–8.2)
RBC # BLD AUTO: 4.13 M/UL (ref 4.2–5.9)
SODIUM SERPL-SCNC: 140 MMOL/L (ref 136–145)
SODIUM SERPL-SCNC: 141 MMOL/L (ref 136–145)
WBC # BLD AUTO: 7.7 K/UL (ref 4–11)

## 2025-01-06 PROCEDURE — 6370000000 HC RX 637 (ALT 250 FOR IP): Performed by: STUDENT IN AN ORGANIZED HEALTH CARE EDUCATION/TRAINING PROGRAM

## 2025-01-06 PROCEDURE — 36415 COLL VENOUS BLD VENIPUNCTURE: CPT

## 2025-01-06 PROCEDURE — 6370000000 HC RX 637 (ALT 250 FOR IP): Performed by: INTERNAL MEDICINE

## 2025-01-06 PROCEDURE — 2580000003 HC RX 258: Performed by: STUDENT IN AN ORGANIZED HEALTH CARE EDUCATION/TRAINING PROGRAM

## 2025-01-06 PROCEDURE — 6360000002 HC RX W HCPCS: Performed by: STUDENT IN AN ORGANIZED HEALTH CARE EDUCATION/TRAINING PROGRAM

## 2025-01-06 PROCEDURE — 2700000000 HC OXYGEN THERAPY PER DAY

## 2025-01-06 PROCEDURE — 94761 N-INVAS EAR/PLS OXIMETRY MLT: CPT

## 2025-01-06 PROCEDURE — 99024 POSTOP FOLLOW-UP VISIT: CPT | Performed by: SURGERY

## 2025-01-06 PROCEDURE — 94640 AIRWAY INHALATION TREATMENT: CPT

## 2025-01-06 PROCEDURE — 85025 COMPLETE CBC W/AUTO DIFF WBC: CPT

## 2025-01-06 PROCEDURE — 99238 HOSP IP/OBS DSCHRG MGMT 30/<: CPT | Performed by: INTERNAL MEDICINE

## 2025-01-06 PROCEDURE — 80053 COMPREHEN METABOLIC PANEL: CPT

## 2025-01-06 PROCEDURE — 6360000002 HC RX W HCPCS

## 2025-01-06 PROCEDURE — 2500000003 HC RX 250 WO HCPCS: Performed by: STUDENT IN AN ORGANIZED HEALTH CARE EDUCATION/TRAINING PROGRAM

## 2025-01-06 RX ORDER — SULFAMETHOXAZOLE AND TRIMETHOPRIM 800; 160 MG/1; MG/1
1 TABLET ORAL 2 TIMES DAILY
Qty: 14 TABLET | Refills: 0 | Status: SHIPPED | OUTPATIENT
Start: 2025-01-06 | End: 2025-01-13

## 2025-01-06 RX ADMIN — ATORVASTATIN CALCIUM 40 MG: 40 TABLET, FILM COATED ORAL at 10:15

## 2025-01-06 RX ADMIN — PIPERACILLIN AND TAZOBACTAM 3375 MG: 3; .375 INJECTION, POWDER, LYOPHILIZED, FOR SOLUTION INTRAVENOUS at 10:16

## 2025-01-06 RX ADMIN — GABAPENTIN 800 MG: 400 CAPSULE ORAL at 10:14

## 2025-01-06 RX ADMIN — BUDESONIDE AND FORMOTEROL FUMARATE DIHYDRATE 2 PUFF: 160; 4.5 AEROSOL RESPIRATORY (INHALATION) at 08:14

## 2025-01-06 RX ADMIN — INSULIN LISPRO 1 UNITS: 100 INJECTION, SOLUTION INTRAVENOUS; SUBCUTANEOUS at 12:31

## 2025-01-06 RX ADMIN — SODIUM CHLORIDE, PRESERVATIVE FREE 10 ML: 5 INJECTION INTRAVENOUS at 10:14

## 2025-01-06 RX ADMIN — DULOXETINE 60 MG: 60 CAPSULE, DELAYED RELEASE ORAL at 10:15

## 2025-01-06 RX ADMIN — ALLOPURINOL 300 MG: 300 TABLET ORAL at 10:15

## 2025-01-06 RX ADMIN — FOLIC ACID 1000 MCG: 1 TABLET ORAL at 10:15

## 2025-01-06 RX ADMIN — PIPERACILLIN AND TAZOBACTAM 3375 MG: 3; .375 INJECTION, POWDER, LYOPHILIZED, FOR SOLUTION INTRAVENOUS at 01:49

## 2025-01-06 RX ADMIN — FUROSEMIDE 20 MG: 20 TABLET ORAL at 10:15

## 2025-01-06 RX ADMIN — ENOXAPARIN SODIUM 40 MG: 100 INJECTION SUBCUTANEOUS at 10:15

## 2025-01-06 RX ADMIN — FERROUS SULFATE TAB 325 MG (65 MG ELEMENTAL FE) 325 MG: 325 (65 FE) TAB at 10:15

## 2025-01-06 RX ADMIN — METOPROLOL SUCCINATE 25 MG: 25 TABLET, EXTENDED RELEASE ORAL at 10:15

## 2025-01-06 RX ADMIN — DILTIAZEM HYDROCHLORIDE 180 MG: 180 CAPSULE, COATED, EXTENDED RELEASE ORAL at 10:15

## 2025-01-06 RX ADMIN — CHOLECALCIFEROL TAB 125 MCG (5000 UNIT) 5000 UNITS: 125 TAB at 10:15

## 2025-01-06 ASSESSMENT — PAIN DESCRIPTION - DESCRIPTORS: DESCRIPTORS: ACHING;SORE

## 2025-01-06 ASSESSMENT — PAIN - FUNCTIONAL ASSESSMENT: PAIN_FUNCTIONAL_ASSESSMENT: ACTIVITIES ARE NOT PREVENTED

## 2025-01-06 ASSESSMENT — PAIN DESCRIPTION - PAIN TYPE: TYPE: CHRONIC PAIN

## 2025-01-06 ASSESSMENT — PAIN DESCRIPTION - ORIENTATION: ORIENTATION: RIGHT;LEFT

## 2025-01-06 ASSESSMENT — PAIN DESCRIPTION - LOCATION: LOCATION: FOOT;KNEE;BACK

## 2025-01-06 ASSESSMENT — PAIN DESCRIPTION - ONSET: ONSET: ON-GOING

## 2025-01-06 ASSESSMENT — PAIN DESCRIPTION - FREQUENCY: FREQUENCY: CONTINUOUS

## 2025-01-06 ASSESSMENT — PAIN DESCRIPTION - DIRECTION: RADIATING_TOWARDS: DENIES

## 2025-01-06 ASSESSMENT — PAIN SCALES - GENERAL: PAINLEVEL_OUTOF10: 4

## 2025-01-06 NOTE — CARE COORDINATION
provided to:     Patient Representative Name:       The Patient and/or Patient Representative Agree with the Discharge Plan?      Grecia Hernandez RN  Case Management Department  Ph: [phone] Fax: [phone]

## 2025-01-06 NOTE — PLAN OF CARE
Problem: Chronic Conditions and Co-morbidities  Goal: Patient's chronic conditions and co-morbidity symptoms are monitored and maintained or improved  1/6/2025 1308 by Amanda Núñez RN  Outcome: Adequate for Discharge  Flowsheets (Taken 1/6/2025 1011)  Care Plan - Patient's Chronic Conditions and Co-Morbidity Symptoms are Monitored and Maintained or Improved: Monitor and assess patient's chronic conditions and comorbid symptoms for stability, deterioration, or improvement  1/6/2025 0100 by Roque Collier RN  Outcome: Progressing  Flowsheets (Taken 1/5/2025 2337)  Care Plan - Patient's Chronic Conditions and Co-Morbidity Symptoms are Monitored and Maintained or Improved: Collaborate with multidisciplinary team to address chronic and comorbid conditions and prevent exacerbation or deterioration  1/5/2025 2337 by Roque Collier RN  Flowsheets  Taken 1/5/2025 2337  Care Plan - Patient's Chronic Conditions and Co-Morbidity Symptoms are Monitored and Maintained or Improved: Collaborate with multidisciplinary team to address chronic and comorbid conditions and prevent exacerbation or deterioration  Taken 1/5/2025 2154  Care Plan - Patient's Chronic Conditions and Co-Morbidity Symptoms are Monitored and Maintained or Improved: Collaborate with multidisciplinary team to address chronic and comorbid conditions and prevent exacerbation or deterioration     Problem: Discharge Planning  Goal: Discharge to home or other facility with appropriate resources  1/6/2025 1308 by Amanda Núñez RN  Outcome: Adequate for Discharge  Flowsheets (Taken 1/6/2025 1011)  Discharge to home or other facility with appropriate resources: Identify barriers to discharge with patient and caregiver  1/6/2025 0100 by Roque Collier RN  Outcome: Progressing  Flowsheets (Taken 1/5/2025 2154)  Discharge to home or other facility with appropriate resources: Arrange for needed discharge resources and transportation as appropriate

## 2025-01-06 NOTE — PLAN OF CARE
Problem: Safety - Adult  Goal: Free from fall injury  1/5/2025 1531 by Sara Dodson, RN  Outcome: Progressing     Problem: Skin/Tissue Integrity  Goal: Absence of new skin breakdown  Description: 1.  Monitor for areas of redness and/or skin breakdown  2.  Assess vascular access sites hourly  3.  Every 4-6 hours minimum:  Change oxygen saturation probe site  4.  Every 4-6 hours:  If on nasal continuous positive airway pressure, respiratory therapy assess nares and determine need for appliance change or resting period.  1/5/2025 1531 by Sara Dodson, RN  Outcome: Progressing     Problem: Chronic Conditions and Co-morbidities  Goal: Patient's chronic conditions and co-morbidity symptoms are monitored and maintained or improved  Flowsheets  Taken 1/5/2025 2337  Care Plan - Patient's Chronic Conditions and Co-Morbidity Symptoms are Monitored and Maintained or Improved: Collaborate with multidisciplinary team to address chronic and comorbid conditions and prevent exacerbation or deterioration  Taken 1/5/2025 2154  Care Plan - Patient's Chronic Conditions and Co-Morbidity Symptoms are Monitored and Maintained or Improved: Collaborate with multidisciplinary team to address chronic and comorbid conditions and prevent exacerbation or deterioration     Problem: Discharge Planning  Goal: Discharge to home or other facility with appropriate resources  Recent Flowsheet Documentation  Taken 1/5/2025 2154 by Roque Collier, RN  Discharge to home or other facility with appropriate resources: Arrange for needed discharge resources and transportation as appropriate

## 2025-01-06 NOTE — DISCHARGE INSTRUCTIONS
rating scale ranges from 6 to 20, where 6 means \"no exertion at all\" and 20 means \"maximal exertion.\" The goal is to use this to gauge how much effort it is taking for you to do your normal daily tasks.   You should be able to recognize when too much exertion is being expended.    Elements of Energy Conservation:   Prioritize/Plan: Decide what needs to be done today, and what can wait for a later date, write to do lists, plan ahead to avoid extra trips, and gather supplies and equipment needed before starting an activity.   Position: Avoid tiring and awkward posture that may impair breathing.  Pace: Slow and steady pace, never rushing!  Pursed lip breathing.  Pursed Lip Breathing: \"Smell the roses, blow out the candles\".

## 2025-01-06 NOTE — ACP (ADVANCE CARE PLANNING)
Advance Care Planning     General Advance Care Planning (ACP) Conversation    Date of Conversation: 1/6/2025  Conducted with: Patient with Decision Making Capacity  Other persons present: None    Healthcare Decision Maker:   Primary Decision Maker: Reagan Early - Brother/Sister - [phone]       Content/Action Overview:  DECLINED ACP Conversation - will revisit periodically  Reviewed DNR/DNI and patient elects Full Code (Attempt Resuscitation)        Length of Voluntary ACP Conversation in minutes:  <16 minutes (Non-Billable)    Grecia Hernandez RN

## 2025-01-06 NOTE — DISCHARGE SUMMARY
CL 98* 101 101   CO2 28 25 29   BUN 10 11 11   CREATININE 0.9 0.9 0.9     LIVER PROFILE:   Recent Labs     01/04/25  1709 01/05/25  0629 01/06/25  0551   AST 21 20 15   ALT 14 12 8*   BILITOT 0.6 0.5 0.3   ALKPHOS 161* 121 123     PT/INR: No results for input(s): \"PROTIME\", \"INR\" in the last 72 hours.  APTT: No results for input(s): \"APTT\" in the last 72 hours.  UA:No results for input(s): \"NITRITE\", \"COLORU\", \"PHUR\", \"LABCAST\", \"WBCUA\", \"RBCUA\", \"MUCUS\", \"TRICHOMONAS\", \"YEAST\", \"BACTERIA\", \"CLARITYU\", \"SPECGRAV\", \"LEUKOCYTESUR\", \"UROBILINOGEN\", \"BILIRUBINUR\", \"BLOODU\", \"GLUCOSEU\", \"AMORPHOUS\" in the last 72 hours.    Invalid input(s): \"KETONESU\"      CT ABDOMEN PELVIS W IV CONTRAST Additional Contrast? None   Final Result   1.  Small perineal abscess.  No soft tissue gas within the perineum/scrotum.      2.  Additional findings, as above.                Discharge Medications     Medication List        START taking these medications      sulfamethoxazole-trimethoprim 800-160 MG per tablet  Commonly known as: BACTRIM DS;SEPTRA DS  Take 1 tablet by mouth 2 times daily for 7 days            CONTINUE taking these medications      albuterol sulfate  (90 Base) MCG/ACT inhaler  Commonly known as: PROVENTIL;VENTOLIN;PROAIR  INHALE TWO (2) PUFFS EVERY SIX (6) HOURS AS NEEDED FOR WHEEZING     Alcohol Swabs Pads  USE AS DIRECTED TO TEST AND INSULIN     allopurinol 300 MG tablet  Commonly known as: ZYLOPRIM  TAKE ONE (1) TABLET BY MOUTH DAILY     atorvastatin 40 MG tablet  Commonly known as: LIPITOR  TAKE ONE (1) TABLET BY MOUTH DAILY     calcium carbonate 500 MG chewable tablet  Commonly known as: TUMS  Take 1 tablet by mouth 3 times daily as needed for Heartburn     dilTIAZem 180 MG extended release capsule  Commonly known as: CARDIZEM CD  Take 1 capsule by mouth daily     DULoxetine 60 MG extended release capsule  Commonly known as: CYMBALTA  TAKE ONE CAPSULE BY MOUTH EVERY DAY     FeroSul 325 (65 Fe) MG

## 2025-01-06 NOTE — PLAN OF CARE
Problem: Chronic Conditions and Co-morbidities  Goal: Patient's chronic conditions and co-morbidity symptoms are monitored and maintained or improved  1/6/2025 0100 by Roque Collier RN  Outcome: Progressing  Flowsheets (Taken 1/5/2025 2337)  Care Plan - Patient's Chronic Conditions and Co-Morbidity Symptoms are Monitored and Maintained or Improved: Collaborate with multidisciplinary team to address chronic and comorbid conditions and prevent exacerbation or deterioration  1/5/2025 2337 by Roque Collier RN  Flowsheets  Taken 1/5/2025 2337  Care Plan - Patient's Chronic Conditions and Co-Morbidity Symptoms are Monitored and Maintained or Improved: Collaborate with multidisciplinary team to address chronic and comorbid conditions and prevent exacerbation or deterioration  Taken 1/5/2025 2154  Care Plan - Patient's Chronic Conditions and Co-Morbidity Symptoms are Monitored and Maintained or Improved: Collaborate with multidisciplinary team to address chronic and comorbid conditions and prevent exacerbation or deterioration     Problem: Discharge Planning  Goal: Discharge to home or other facility with appropriate resources  Outcome: Progressing  Flowsheets (Taken 1/5/2025 2154)  Discharge to home or other facility with appropriate resources: Arrange for needed discharge resources and transportation as appropriate     Problem: Safety - Adult  Goal: Free from fall injury  1/6/2025 0100 by Roque Collier RN  Outcome: Progressing  Flowsheets (Taken 1/6/2025 0100)  Free From Fall Injury: Based on caregiver fall risk screen, instruct family/caregiver to ask for assistance with transferring infant if caregiver noted to have fall risk factors  1/5/2025 1531 by Sara Dodson, RN  Outcome: Progressing     Problem: ABCDS Injury Assessment  Goal: Absence of physical injury  Outcome: Progressing  Flowsheets (Taken 1/6/2025 0100)  Absence of Physical Injury: Implement safety measures based on patient assessment

## 2025-01-06 NOTE — FLOWSHEET NOTE
01/05/25 1930   Handoff   Communication Given Shift Handoff   Handoff Given To Satnam   Handoff Received From Christiano   Handoff Communication Face to Face;At bedside   Time Handoff Given 1925   End of Shift Check Performed Yes

## 2025-01-07 ENCOUNTER — CARE COORDINATION (OUTPATIENT)
Dept: CARE COORDINATION | Age: 70
End: 2025-01-07

## 2025-01-07 ENCOUNTER — TELEPHONE (OUTPATIENT)
Dept: FAMILY MEDICINE CLINIC | Age: 70
End: 2025-01-07

## 2025-01-07 DIAGNOSIS — N49.2 SCROTAL ABSCESS: Primary | ICD-10-CM

## 2025-01-07 PROCEDURE — 1111F DSCHRG MED/CURRENT MED MERGE: CPT | Performed by: FAMILY MEDICINE

## 2025-01-07 NOTE — CARE COORDINATION
Ambulatory Care Coordination Note     2025 9:52 AM     Patient Current Location:  Home: [address] [place] [address] [address]  [place] [address]     ACM contacted the patient by telephone. Verified name and  with patient as identifiers.         ACM: Gia Hoffman RN     Challenges to be reviewed by the provider   Additional needs identified to be addressed with provider No  none               Method of communication with provider: chart routing.    Utilization: Has the patient been discharged from the hospital since your last call? yes -   Call within 2 business days of discharge: Yes    Patient: Sandor Early    Patient : 1955   MRN: [id number]    Reason for Admission: scrotal abcess  Discharge Date: 25  RURS: Readmission Risk Score: 19.9      Last Discharge Facility       Date Complaint Diagnosis Description Type Department Provider    25 Abscess Perineal abscess ... ED to Hosp-Admission (Discharged) (ADMITTED) [facility] 2 Shelby Akbar MD; Saleem, ...            Was this an external facility discharge? No    Ambulatory Care Manager reviewed discharge instructions with patient. The patient was given an opportunity to ask questions; all questions answered at this time.. The patient verbalized understanding.   Were discharge instructions available to patient? Yes.   Reviewed appropriate site of care based on symptoms and resources available to patient including: PCP  Specialist. The patient agrees to contact the primary care provider and/or specialist office for questions related to their healthcare.     Patients top risk factors for readmission: medical condition-recurrence of abcess    Hospital follow up appointment: Discussed follow up appointments. Patient has hospital follow up appointment scheduled within 14 days of discharge.     Care Summary Note: Pt will be following up with surgeon, he has number to arrange for next week  Plans on scheduling w PCP for reg followup, needs to schedule w

## 2025-01-07 NOTE — TELEPHONE ENCOUNTER
[facility] Transitions Initial Follow Up Call  Outreach made within 2 business days of discharge: Yes  Patient: Sandor Early Patient : 1955   MRN: [id number]  Reason for Admission: There are no discharge diagnoses documented for the most recent discharge.  Discharge Date: 25     Spoke with: No answer / not a working number  Discharge department/facility: [place]  Scheduled appointment with PCP within 7-14 days  Follow Up  No future appointments.    KRYSTLE LEIGH RN

## 2025-01-07 NOTE — TELEPHONE ENCOUNTER
[facility] Transitions Initial Follow Up Call    Outreach made within 2 business days of discharge: Yes    Patient: Sandor Early Patient : 1955   MRN: [id number]  Reason for Admission: There are no discharge diagnoses documented for the most recent discharge.  Discharge Date: 25       Spoke with: Ed / brother    Discharge department/facility: [place]    Non-face-to-face services provided:  Scheduled appointment with PCP-   Obtained and reviewed discharge summary and/or continuity of care documents    TCM Interactive Patient Contact:  Was patient able to fill all prescriptions: Yes  Was patient instructed to bring all medications to the follow-up visit: Yes  Is patient taking all medications as directed in the discharge summary? Yes  Does patient understand their discharge instructions: Yes  Does patient have questions or concerns that need addressed prior to 7-14 day follow up office visit: no    Scheduled appointment with PCP within 7-14 days    Follow Up  Future Appointments   Date Time Provider Department Center   1/10/2025  9:10 AM Juliet Mayorga MD [place] [facility]         KRYSTLE LEIGH RN

## 2025-01-07 NOTE — PROGRESS NOTES
[facility] GENERAL SURGERY DAILY PROGRESS NOTE    SUBJECTIVE: Awake, alert.  Feels better.     OBJECTIVE: CURRENT VITALS:  /75   Pulse 70   Temp 97.7 °F (36.5 °C) (Oral)   Resp 18   Ht 1.702 m (5' 7\")   Wt 99.3 kg (219 lb)   SpO2 100%   BMI 34.30 kg/m²          ABD: Soft  Scrotal I&D site looks good.  No evident undrained pus.    LABS:    CBC:   Recent Labs     01/04/25  1709 01/05/25  0629 01/06/25  0551   WBC 15.7* 11.3* 7.7   RBC 4.84 4.33 4.13*   HGB 14.6 13.4* 12.9*   HCT 44.7 40.2* 38.4*   MCV 92.4 92.9 93.0   RDW 14.8 14.9 15.2    228 232     BMP:   Recent Labs     01/04/25  1709 01/05/25  0629 01/06/25  0551    140 141   K 3.7 3.6 3.6   CL 98* 101 101   CO2 28 25 29   BUN 10 11 11   CREATININE 0.9 0.9 0.9         ASSESSMENT:   POD 1 I&D scrotal abscess      PLAN:   Discussed with Dr. Mckeon.  Ok for discharge with PO antibiotics and outpatient follow up.          LINDY DE SANTIAGO MD   
Bedside Mobility Assessment Tool (BMAT):     Assessment Level 1- Sit and Shake    1. From a semi-reclined position, ask patient to sit up and rotate to a seated position at the side of the bed. Can use the bedrail.    2. Ask patient to reach out and grab your hand and shake making sure patient reaches across his/her midline.   Pass- Patient is able to come to a seated position, maintain core strength. Maintains seated balance while reaching across midline. Move on to Assessment Level 2.     Assessment Level 2- Stretch and Point   1. With patient in seated position at the side of the bed, have patient place both feet on the floor (or stool) with knees no higher than hips.    2. Ask patient to stretch one leg and straighten the knee, then bend the ankle/flex and point the toes. If appropriate, repeat with the other leg.   Pass- Patient is able to demonstrate appropriate quad strength on intended weight bearing limb(s). Move onto Assessment Level 3.     Assessment Level 3- Stand   1. Ask patient to elevate off the bed or chair (seated to standing) using an assistive device (cane, bedrail).    2. Patient should be able to raise buttocks off be and hold for a count of five. May repeat once.   Pass- Patient maintains standing stability for at least 5 seconds, proceed to assessment level 4.    Assessment Level 4- Walk   1. Ask patient to march in place at bedside.    2. Then ask patient to advance step and return each foot. Some medical conditions may render a patient from stepping backwards, use your best clinical judgement.   Pass- Patient demonstrates balance while shifting weight and ability to step, takes independent steps, does not use assistive device patient is MOBILITY LEVEL 4.      Mobility Level- 4    Patient is able to demonstrate the ability to move from a reclining position to an upright position within the recliner.    
Chart accessed when pt called back and requested name of surgeon and follow-up instructions. Read to pt from AVS to see Dr. Velasquez in 1-2 [facility] with contact information.  
PIVs removed for discharge. Discharge instructions reviewed and copy given to pt. Pt denies questions or concerns. Meds to beds provided abx. Pt denies further needs. Friend here to pick patient up. Writer took patient to main entrance via wheelchair at 1325.   
PM Assessment completed. Pt does not express any pain or discomfort at this time. Respirations are even & easy. No complaints voiced. Pt denies needs at this time. SR up x 2, and bed in low position. Call light is within reach.    Bedside Mobility Assessment Tool (BMAT):     Assessment Level 1- Sit and Shake    1. From a semi-reclined position, ask patient to sit up and rotate to a seated position at the side of the bed. Can use the bedrail.    2. Ask patient to reach out and grab your hand and shake making sure patient reaches across his/her midline.   Pass- Patient is able to come to a seated position, maintain core strength. Maintains seated balance while reaching across midline. Move on to Assessment Level 2.     Assessment Level 2- Stretch and Point   1. With patient in seated position at the side of the bed, have patient place both feet on the floor (or stool) with knees no higher than hips.    2. Ask patient to stretch one leg and straighten the knee, then bend the ankle/flex and point the toes. If appropriate, repeat with the other leg.   Pass- Patient is able to demonstrate appropriate quad strength on intended weight bearing limb(s). Move onto Assessment Level 3.     Assessment Level 3- Stand   1. Ask patient to elevate off the bed or chair (seated to standing) using an assistive device (cane, bedrail).    2. Patient should be able to raise buttocks off be and hold for a count of five. May repeat once.   Pass- Patient maintains standing stability for at least 5 seconds, proceed to assessment level 4.    Assessment Level 4- Walk   1. Ask patient to march in place at bedside.    2. Then ask patient to advance step and return each foot. Some medical conditions may render a patient from stepping backwards, use your best clinical judgement.   Pass- Patient demonstrates balance while shifting weight and ability to step, takes independent steps, does not use assistive device patient is MOBILITY LEVEL 
Patient resting quietly in bed, no complaints voiced at this time. Procedure completed at bedside today, see surgery note. Call light in reach.  
Shift assessment complete. VSS. Patient A/OX4. Denies the need for pain medication. Scheduled meds given. See MAR. Scrotum w/ minimal drainage, pad on. Extra pads provided. Pt denies further needs. Call light within reach.   
Transferred care to CONSTANT[facility] Fink. Face to face bedside report given, no need voiced at this time. Pt in bed with eyes closed.  Pt awake in bed.   No signs of distress noted.  Call light within reach.   Denies needs.     
Was informed in report from [facility] that patient would arrive with large amount of cash however patient did not come with any money on him. Patient stated he gave the money to his step son Abran Teixeira. Transport verified this statement.   
Went over medications with patient and updated in epic to the best of his knowledge. Pharmacy listed on file is the patients current and correct pharmacy       Patient chg bath completed on admission, skin assessed, VSS, patient given a gown and safety socks.   
a seated position at the side of the bed. Can use the bedrail.    2. Ask patient to reach out and grab your hand and shake making sure patient reaches across his/her midline.   Pass- Patient is able to come to a seated position, maintain core strength. Maintains seated balance while reaching across midline. Move on to Assessment Level 2.     Assessment Level 2- Stretch and Point   1. With patient in seated position at the side of the bed, have patient place both feet on the floor (or stool) with knees no higher than hips.    2. Ask patient to stretch one leg and straighten the knee, then bend the ankle/flex and point the toes. If appropriate, repeat with the other leg.   Pass- Patient is able to demonstrate appropriate quad strength on intended weight bearing limb(s). Move onto Assessment Level 3.     Assessment Level 3- Stand   1. Ask patient to elevate off the bed or chair (seated to standing) using an assistive device (cane, bedrail).    2. Patient should be able to raise buttocks off be and hold for a count of five. May repeat once.   Pass- Patient maintains standing stability for at least 5 seconds, proceed to assessment level 4.    Assessment Level 4- Walk   1. Ask patient to march in place at bedside.    2. Then ask patient to advance step and return each foot. Some medical conditions may render a patient from stepping backwards, use your best clinical judgement.   Pass- Patient demonstrates balance while shifting weight and ability to step, takes independent steps, does not use assistive device patient is MOBILITY LEVEL 4.      Mobility Level- 4    Patient is able to demonstrate the ability to move from a reclining position to an upright position within the recliner.   
  #Hyperlipidemia  -on Atorvastatin,     #h/o Gout  -stable. No Acute flare  -on Allopurinol.      # h/o Cirrhosis  -secondary to [place], alcohol      #BPH  - was on Flomax. Currently not taking per med rec               DVT Prophylaxis: Lovenox   Diet: ADULT DIET; Regular; 4 carb choices (60 gm/meal)  Diet NPO  Code Status: Full Code           Shelby Hand MD   1/5/2025

## 2025-01-09 LAB
BACTERIA BLD CULT ORG #2: NORMAL
BACTERIA BLD CULT: NORMAL

## 2025-01-09 RX ORDER — PANTOPRAZOLE SODIUM 40 MG/1
TABLET, DELAYED RELEASE ORAL
Qty: 30 TABLET | Refills: 2 | OUTPATIENT
Start: 2025-01-09

## 2025-01-10 LAB
BACTERIA SPEC AEROBE CULT: ABNORMAL
BACTERIA SPEC ANAEROBE CULT: ABNORMAL
GRAM STN SPEC: ABNORMAL

## 2025-01-14 ENCOUNTER — CARE COORDINATION (OUTPATIENT)
Dept: CARE COORDINATION | Age: 70
End: 2025-01-14

## 2025-01-14 NOTE — CARE COORDINATION
Ambulatory Care Coordination Note     2025 1:07 PM     Patient Current Location:  Home: [address] [place] [address]  [place] [address]     ACM contacted the patient by telephone. Verified name and  with patient as identifiers.         ACM: Gia Hoffman RN     Challenges to be reviewed by the provider   Additional needs identified to be addressed with provider No  none               Method of communication with provider: chart routing.    Utilization: Patient has not had any utilization since our last call.     Care Summary Note: Call to patient  Reports abscess improved   Has appt this Thurs w surgeon   Has appt  w PCP, had to reschedule due to inclement weather    REviewed COPD/ Medications,pt reports he was able to walk outside yesterday, even had to walk up driveway and he felt good, no breathing problems   Reports he has been checking o2 sats without oxygen sometimes and SpO2 is 97%.   Encouraged to d/w PCP   Has not seen Pulm in awhile, encouraged to schedule folow up   Reviewed COPD Zone tool    Offered patient enrollment in the Remote Patient Monitoring (RPM) program for in-home monitoring: Yes, but did not enroll at this time: already monitoring with home equipment.     Assessments Completed:   COPD Assessment    Does the patient understand envrionmental exposure?: Yes  Is the patient able to verbalize Rescue vs. Long Acting medications?: Yes  Does the patient have a nebulizer?: Yes  Does the patient use a space with inhaled medications?: No     No patient-reported symptoms         Symptoms:     Have you had a recent diagnosis of pneumonia either by PCP or at a hospital?: No      ,   General Assessment    Do you have any symptoms that are causing concern?: No          Medications Reviewed:   Completed during this call    Advance Care Planning:   Not on file; education provided     Care Planning:   Education Documentation  Educate on COPD Zone, taught by Gia Hoffman, RN at 2025  1:06

## 2025-01-16 ENCOUNTER — OUTSIDE SERVICES (OUTPATIENT)
Dept: SURGERY | Age: 70
End: 2025-01-16

## 2025-01-16 DIAGNOSIS — Z09 SURGICAL FOLLOW-UP CARE: Primary | ICD-10-CM

## 2025-01-16 DIAGNOSIS — I10 BENIGN ESSENTIAL HTN: ICD-10-CM

## 2025-01-16 DIAGNOSIS — I10 UNCONTROLLED HYPERTENSION: ICD-10-CM

## 2025-01-16 PROCEDURE — 99024 POSTOP FOLLOW-UP VISIT: CPT | Performed by: SURGERY

## 2025-01-16 RX ORDER — METOPROLOL SUCCINATE 50 MG/1
TABLET, EXTENDED RELEASE ORAL
Qty: 14 TABLET | Refills: 0 | Status: SHIPPED | OUTPATIENT
Start: 2025-01-16

## 2025-01-16 RX ORDER — DILTIAZEM HYDROCHLORIDE 120 MG/1
TABLET, FILM COATED ORAL
Qty: 21 TABLET | Refills: 0 | Status: SHIPPED | OUTPATIENT
Start: 2025-01-16

## 2025-01-16 NOTE — TELEPHONE ENCOUNTER
Refill Request     CONFIRM preferrred pharmacy with the patient.    If Mail Order Rx - Pend for 90 day refill.      Last Seen: Last Seen Department: 10/2/2024  Last Seen by PCP: Visit date not found    Last Written: 11/15/2024    If no future appointment scheduled, route STAFF MESSAGE with patient name to the  Pool for scheduling.      Next Appointment:   Future Appointments   Date Time Provider Department Center   1/28/2025 10:50 AM [facility]       Message sent to  to schedule appt with patient?  NO      Requested Prescriptions     Pending Prescriptions Disp Refills    dilTIAZem (CARDIZEM) 120 MG tablet [Pharmacy Med Name: DILTIAZEM HYDROCHLORIDE 120MG TABLET] 21 tablet 0     Sig: TAKE ONE (1) ONE HALF (1/2) TABLETS BY MOUTH EVERY MORNING    metoprolol succinate (TOPROL XL) 50 MG extended release tablet [Pharmacy Med Name: METOPROLOL SUCCINATE ER 50MG ER TABLET ER 24HR] 14 tablet 0     Sig: TAKE ONE (1) TABLET BY MOUTH IN MORNING

## 2025-01-21 ENCOUNTER — CARE COORDINATION (OUTPATIENT)
Dept: CARE COORDINATION | Age: 70
End: 2025-01-21

## 2025-01-21 NOTE — CARE COORDINATION
Ambulatory Care Coordination Note     2025 11:12 AM     Patient Current Location:  Home: [address] [place] [address]  [place] [address]     ACM contacted the patient by telephone. Verified name and  with patient as identifiers.         ACM: Gia Hoffman RN     Challenges to be reviewed by the provider   Additional needs identified to be addressed with provider No  none               Method of communication with provider: chart routing.    Utilization: Patient has not had any utilization since our last call.     Care Summary Note: Call to patient   Saw Surgeon, abscess healing well  Has appt next week w new provider at [facility]   Phone is acting up.  Has not schedule w pulm  Sees pain, reports about 3 months ago they tried wrapping feet in gauze for help w neuropathy pain, but no change per pt,     Reviewed COPD Zone Tool   Received ACP docs, will look over and complete, no questions    Offered patient enrollment in the Remote Patient Monitoring (RPM) program for in-home monitoring: Yes, but did not enroll at this time: already monitoring with home equipment.     Assessments Completed:   Diabetes Assessment    Medic Alert ID: No  Meal Planning: Plate Method, Avoidance of concentrated sweets   How often do you test your blood sugar?: Daily   Do you have barriers with adherence to non-pharmacologic self-management interventions? (Nutrition/Exercise/Self-Monitoring): No   Have you ever had to go to the ED for symptoms of low blood sugar?: No       No patient-reported symptoms         ,   COPD Assessment    Does the patient understand envrionmental exposure?: Yes  Is the patient able to verbalize Rescue vs. Long Acting medications?: Yes  Does the patient have a nebulizer?: Yes  Does the patient use a space with inhaled medications?: No     No patient-reported symptoms         Symptoms:               Medications Reviewed:   Patient denies any changes with medications and reports taking all medications as

## 2025-01-28 ENCOUNTER — OFFICE VISIT (OUTPATIENT)
Dept: FAMILY MEDICINE CLINIC | Age: 70
End: 2025-01-28

## 2025-01-28 VITALS
OXYGEN SATURATION: 92 % | DIASTOLIC BLOOD PRESSURE: 74 MMHG | TEMPERATURE: 97.6 F | RESPIRATION RATE: 19 BRPM | SYSTOLIC BLOOD PRESSURE: 120 MMHG | WEIGHT: 202 LBS | HEART RATE: 65 BPM | BODY MASS INDEX: 31.64 KG/M2

## 2025-01-28 DIAGNOSIS — Z72.0 TOBACCO ABUSE: ICD-10-CM

## 2025-01-28 DIAGNOSIS — Z09 HOSPITAL DISCHARGE FOLLOW-UP: ICD-10-CM

## 2025-01-28 DIAGNOSIS — J96.11 CHRONIC RESPIRATORY FAILURE WITH HYPOXIA: ICD-10-CM

## 2025-01-28 DIAGNOSIS — F17.200 CURRENT SMOKER: ICD-10-CM

## 2025-01-28 DIAGNOSIS — N49.2 SCROTAL ABSCESS: Primary | ICD-10-CM

## 2025-01-28 ASSESSMENT — PATIENT HEALTH QUESTIONNAIRE - PHQ9
SUM OF ALL RESPONSES TO PHQ QUESTIONS 1-9: 0
1. LITTLE INTEREST OR PLEASURE IN DOING THINGS: NOT AT ALL
SUM OF ALL RESPONSES TO PHQ QUESTIONS 1-9: 0
2. FEELING DOWN, DEPRESSED OR HOPELESS: NOT AT ALL
SUM OF ALL RESPONSES TO PHQ QUESTIONS 1-9: 0
SUM OF ALL RESPONSES TO PHQ QUESTIONS 1-9: 0
SUM OF ALL RESPONSES TO PHQ9 QUESTIONS 1 & 2: 0

## 2025-01-28 NOTE — PROGRESS NOTES
INJECT 15 UNITS INTO THE SKIN DAILY WITH SUPPER     metoprolol succinate 50 MG extended release tablet  Commonly known as: TOPROL XL  TAKE ONE (1) TABLET BY MOUTH IN MORNING     ondansetron 4 MG tablet  Commonly known as: ZOFRAN  TAKE ONE TABLET BY MOUTH EVERY SIX (6) HOURS AS NEEDED FOR NAUSEA OR VOMITING     oxyCODONE HCl 10 MG immediate release tablet  Commonly known as: OXY-IR     Trelegy Ellipta 100-62.5-25 MCG/ACT Aepb inhaler  Generic drug: fluticasone-umeclidin-vilant  INHALE ONE (1) PUFF INTO THE LUNGS IN THE MORNING.     True Metrix Blood Glucose Test strip  Generic drug: blood glucose test strips  USE TWICE DAILY     Unifine Pentips 31G X 8 MM Misc  Generic drug: Insulin Pen Needle  USE ONCE DAILY WITH INJECTION     vitamin D 125 MCG (5000 UT) Caps capsule  Commonly known as: CHOLECALCIFEROL  TAKE ONE (1) CAPSULE BY MOUTH DAILY                Medications marked \"taking\" at this time  Outpatient Medications Marked as Taking for the 1/28/25 encounter (Office Visit) with Juliet Mayorga MD   Medication Sig Dispense Refill    metoprolol succinate (TOPROL XL) 50 MG extended release tablet TAKE ONE (1) TABLET BY MOUTH IN MORNING 14 tablet 0    albuterol sulfate HFA (PROVENTIL;VENTOLIN;PROAIR) 108 (90 Base) MCG/ACT inhaler INHALE TWO (2) PUFFS EVERY SIX (6) HOURS AS NEEDED FOR WHEEZING 1 each 2    atorvastatin (LIPITOR) 40 MG tablet TAKE ONE (1) TABLET BY MOUTH DAILY 30 tablet 2    DULoxetine (CYMBALTA) 60 MG extended release capsule TAKE ONE CAPSULE BY MOUTH EVERY DAY 90 capsule 1    Alcohol Swabs PADS USE AS DIRECTED TO TEST AND INSULIN 100 each 5    oxyCODONE HCl (OXY-IR) 10 MG immediate release tablet Take 1 tablet by mouth every 8 hours as needed for Pain.      calcium carbonate (TUMS) 500 MG chewable tablet Take 1 tablet by mouth 3 times daily as needed for Heartburn      insulin glargine (LANTUS SOLOSTAR) 100 UNIT/ML injection pen Inject 15 Units into the skin nightly INJECT 15 UNITS INTO THE SKIN DAILY

## 2025-01-29 ENCOUNTER — TELEPHONE (OUTPATIENT)
Dept: FAMILY MEDICINE CLINIC | Age: 70
End: 2025-01-29

## 2025-01-29 ENCOUNTER — CARE COORDINATION (OUTPATIENT)
Dept: CARE COORDINATION | Age: 70
End: 2025-01-29

## 2025-01-29 NOTE — CARE COORDINATION
Ambulatory Care Coordination Note     1/29/2025 10:51 AM     ACM outreach attempt by this ACM today to perform care management follow up . ACM was unable to reach the patient by telephone today;   left voice message requesting a return phone call to this ACM.     ACM: Gia Hoffman RN     Care Summary Note: Had OV w new provider yesterday   Noted order for CT lung screening  Smoking cessation   Supp Oxygen    PCP/Specialist follow up:   Future Appointments         Provider Specialty Dept Phone    4/15/2025 2:20 PM Juliet Mayorga MD Family Medicine [phone]            Follow Up:   Plan for next AC outreach in approximately 1 week to complete:  - medication review  - advance care planning  - goal progression  - education   - follow-up appointment with New PCP, DR Mayorga .

## 2025-01-30 ENCOUNTER — HOSPITAL ENCOUNTER (INPATIENT)
Age: 70
LOS: 54 days | Discharge: NURSING, REHAB OR LONG-TERM CARE | DRG: 207 | End: 2025-03-25
Attending: EMERGENCY MEDICINE | Admitting: INTERNAL MEDICINE
Payer: MEDICARE

## 2025-01-30 ENCOUNTER — APPOINTMENT (OUTPATIENT)
Dept: GENERAL RADIOLOGY | Age: 70
DRG: 207 | End: 2025-01-30
Attending: EMERGENCY MEDICINE
Payer: MEDICARE

## 2025-01-30 DIAGNOSIS — F10.90 ALCOHOL USE DISORDER: ICD-10-CM

## 2025-01-30 DIAGNOSIS — J96.22 ACUTE ON CHRONIC RESPIRATORY FAILURE WITH HYPOXIA AND HYPERCAPNIA: ICD-10-CM

## 2025-01-30 DIAGNOSIS — E83.42 HYPOMAGNESEMIA: ICD-10-CM

## 2025-01-30 DIAGNOSIS — J10.1 INFLUENZA A: ICD-10-CM

## 2025-01-30 DIAGNOSIS — R07.9 CHEST PAIN, UNSPECIFIED TYPE: ICD-10-CM

## 2025-01-30 DIAGNOSIS — E11.40 PAINFUL DIABETIC NEUROPATHY (HCC): ICD-10-CM

## 2025-01-30 DIAGNOSIS — J96.21 ACUTE ON CHRONIC RESPIRATORY FAILURE WITH HYPOXIA AND HYPERCAPNIA: ICD-10-CM

## 2025-01-30 DIAGNOSIS — R60.9 EDEMA, UNSPECIFIED TYPE: ICD-10-CM

## 2025-01-30 DIAGNOSIS — J44.1 COPD EXACERBATION (HCC): Primary | ICD-10-CM

## 2025-01-30 PROBLEM — J96.91 RESPIRATORY FAILURE WITH HYPOXIA: Status: ACTIVE | Noted: 2025-01-30

## 2025-01-30 PROBLEM — J96.00 ACUTE RESPIRATORY FAILURE: Status: ACTIVE | Noted: 2025-01-30

## 2025-01-30 LAB
ALBUMIN SERPL-MCNC: 4.7 G/DL (ref 3.4–5)
ALBUMIN/GLOB SERPL: 1 {RATIO} (ref 1.1–2.2)
ALP SERPL-CCNC: 158 U/L (ref 40–129)
ALT SERPL-CCNC: 14 U/L (ref 10–40)
ANION GAP SERPL CALCULATED.3IONS-SCNC: 13 MMOL/L (ref 3–16)
AST SERPL-CCNC: 38 U/L (ref 15–37)
BASE EXCESS BLDV CALC-SCNC: 1.2 MMOL/L (ref -3–3)
BASOPHILS # BLD: 0.1 K/UL (ref 0–0.2)
BASOPHILS NFR BLD: 1 %
BILIRUB SERPL-MCNC: 0.4 MG/DL (ref 0–1)
BUN SERPL-MCNC: 13 MG/DL (ref 7–20)
CA-I BLD-SCNC: 1.35 MMOL/L (ref 1.12–1.32)
CALCIUM SERPL-MCNC: 12.4 MG/DL (ref 8.3–10.6)
CHLORIDE SERPL-SCNC: 93 MMOL/L (ref 99–110)
CO2 BLDV-SCNC: 32 MMOL/L
CO2 SERPL-SCNC: 33 MMOL/L (ref 21–32)
COHGB MFR BLDV: 1.4 % (ref 0–1.5)
CREAT SERPL-MCNC: 0.9 MG/DL (ref 0.8–1.3)
DEPRECATED RDW RBC AUTO: 15.3 % (ref 12.4–15.4)
EKG ATRIAL RATE: 133 BPM
EKG DIAGNOSIS: NORMAL
EKG P AXIS: 61 DEGREES
EKG P-R INTERVAL: 178 MS
EKG Q-T INTERVAL: 280 MS
EKG QRS DURATION: 88 MS
EKG QTC CALCULATION (BAZETT): 416 MS
EKG R AXIS: -75 DEGREES
EKG T AXIS: 77 DEGREES
EKG VENTRICULAR RATE: 133 BPM
EOSINOPHIL # BLD: 0 K/UL (ref 0–0.6)
EOSINOPHIL NFR BLD: 0.1 %
FLUAV RNA UPPER RESP QL NAA+PROBE: POSITIVE
FLUBV AG NPH QL: NEGATIVE
GFR SERPLBLD CREATININE-BSD FMLA CKD-EPI: >90 ML/MIN/{1.73_M2}
GLUCOSE BLD-MCNC: 169 MG/DL (ref 70–99)
GLUCOSE SERPL-MCNC: 128 MG/DL (ref 70–99)
HCO3 BLDV-SCNC: 29.8 MMOL/L (ref 23–29)
HCT VFR BLD AUTO: 45.4 % (ref 40.5–52.5)
HGB BLD-MCNC: 15 G/DL (ref 13.5–17.5)
LACTATE BLDV-SCNC: 1.7 MMOL/L (ref 0.4–1.9)
LYMPHOCYTES # BLD: 1.1 K/UL (ref 1–5.1)
LYMPHOCYTES NFR BLD: 17 %
MAGNESIUM SERPL-MCNC: 1.3 MG/DL (ref 1.8–2.4)
MCH RBC QN AUTO: 30.9 PG (ref 26–34)
MCHC RBC AUTO-ENTMCNC: 32.9 G/DL (ref 31–36)
MCV RBC AUTO: 93.7 FL (ref 80–100)
METHGB MFR BLDV: 0.3 %
MONOCYTES # BLD: 1 K/UL (ref 0–1.3)
MONOCYTES NFR BLD: 15.6 %
NEUTROPHILS # BLD: 4.1 K/UL (ref 1.7–7.7)
NEUTROPHILS NFR BLD: 66.3 %
NT-PROBNP SERPL-MCNC: 1230 PG/ML (ref 0–124)
O2 THERAPY: ABNORMAL
PCO2 BLDV: 63 MMHG (ref 40–50)
PERFORMED ON: ABNORMAL
PH BLDV: 7.29 [PH] (ref 7.35–7.45)
PH BLDV: 7.33 [PH] (ref 7.35–7.45)
PLATELET # BLD AUTO: 166 K/UL (ref 135–450)
PMV BLD AUTO: 7.8 FL (ref 5–10.5)
PO2 BLDV: 40.3 MMHG (ref 25–40)
POTASSIUM SERPL-SCNC: 4 MMOL/L (ref 3.5–5.1)
PROCALCITONIN SERPL IA-MCNC: 0.18 NG/ML (ref 0–0.15)
PROT SERPL-MCNC: 9.2 G/DL (ref 6.4–8.2)
RBC # BLD AUTO: 4.85 M/UL (ref 4.2–5.9)
SAO2 % BLDV: 68 %
SARS-COV-2 RDRP RESP QL NAA+PROBE: NOT DETECTED
SODIUM SERPL-SCNC: 139 MMOL/L (ref 136–145)
TROPONIN, HIGH SENSITIVITY: 17 NG/L (ref 0–22)
TROPONIN, HIGH SENSITIVITY: 20 NG/L (ref 0–22)
TROPONIN, HIGH SENSITIVITY: 26 NG/L (ref 0–22)
WBC # BLD AUTO: 6.2 K/UL (ref 4–11)

## 2025-01-30 PROCEDURE — 2500000003 HC RX 250 WO HCPCS

## 2025-01-30 PROCEDURE — 99285 EMERGENCY DEPT VISIT HI MDM: CPT

## 2025-01-30 PROCEDURE — 87635 SARS-COV-2 COVID-19 AMP PRB: CPT

## 2025-01-30 PROCEDURE — 94761 N-INVAS EAR/PLS OXIMETRY MLT: CPT

## 2025-01-30 PROCEDURE — 6370000000 HC RX 637 (ALT 250 FOR IP): Performed by: EMERGENCY MEDICINE

## 2025-01-30 PROCEDURE — 84484 ASSAY OF TROPONIN QUANT: CPT

## 2025-01-30 PROCEDURE — 83605 ASSAY OF LACTIC ACID: CPT

## 2025-01-30 PROCEDURE — 84145 PROCALCITONIN (PCT): CPT

## 2025-01-30 PROCEDURE — 2580000003 HC RX 258: Performed by: EMERGENCY MEDICINE

## 2025-01-30 PROCEDURE — 36415 COLL VENOUS BLD VENIPUNCTURE: CPT

## 2025-01-30 PROCEDURE — 83880 ASSAY OF NATRIURETIC PEPTIDE: CPT

## 2025-01-30 PROCEDURE — 83735 ASSAY OF MAGNESIUM: CPT

## 2025-01-30 PROCEDURE — 82803 BLOOD GASES ANY COMBINATION: CPT

## 2025-01-30 PROCEDURE — 71045 X-RAY EXAM CHEST 1 VIEW: CPT

## 2025-01-30 PROCEDURE — 2700000000 HC OXYGEN THERAPY PER DAY

## 2025-01-30 PROCEDURE — 80053 COMPREHEN METABOLIC PANEL: CPT

## 2025-01-30 PROCEDURE — 93010 ELECTROCARDIOGRAM REPORT: CPT | Performed by: INTERNAL MEDICINE

## 2025-01-30 PROCEDURE — 6360000002 HC RX W HCPCS: Performed by: INTERNAL MEDICINE

## 2025-01-30 PROCEDURE — 93005 ELECTROCARDIOGRAM TRACING: CPT | Performed by: EMERGENCY MEDICINE

## 2025-01-30 PROCEDURE — 2000000000 HC ICU R&B

## 2025-01-30 PROCEDURE — 85025 COMPLETE CBC W/AUTO DIFF WBC: CPT

## 2025-01-30 PROCEDURE — 82330 ASSAY OF CALCIUM: CPT

## 2025-01-30 PROCEDURE — 6360000002 HC RX W HCPCS

## 2025-01-30 PROCEDURE — 6360000002 HC RX W HCPCS: Performed by: EMERGENCY MEDICINE

## 2025-01-30 PROCEDURE — 87804 INFLUENZA ASSAY W/OPTIC: CPT

## 2025-01-30 PROCEDURE — 96374 THER/PROPH/DIAG INJ IV PUSH: CPT

## 2025-01-30 PROCEDURE — 94640 AIRWAY INHALATION TREATMENT: CPT

## 2025-01-30 RX ORDER — GABAPENTIN 400 MG/1
800 CAPSULE ORAL 4 TIMES DAILY
Status: DISCONTINUED | OUTPATIENT
Start: 2025-01-30 | End: 2025-02-11

## 2025-01-30 RX ORDER — SODIUM CHLORIDE 0.9 % (FLUSH) 0.9 %
5-40 SYRINGE (ML) INJECTION EVERY 12 HOURS SCHEDULED
Status: DISCONTINUED | OUTPATIENT
Start: 2025-01-30 | End: 2025-01-30

## 2025-01-30 RX ORDER — GLUCAGON 1 MG/ML
1 KIT INJECTION PRN
Status: DISCONTINUED | OUTPATIENT
Start: 2025-01-30 | End: 2025-03-25 | Stop reason: HOSPADM

## 2025-01-30 RX ORDER — FOLIC ACID 1 MG/1
1 TABLET ORAL ONCE
Status: COMPLETED | OUTPATIENT
Start: 2025-01-30 | End: 2025-01-30

## 2025-01-30 RX ORDER — AZITHROMYCIN 250 MG/1
500 TABLET, FILM COATED ORAL DAILY
Status: COMPLETED | OUTPATIENT
Start: 2025-01-31 | End: 2025-02-02

## 2025-01-30 RX ORDER — DILTIAZEM HYDROCHLORIDE 180 MG/1
180 CAPSULE, COATED, EXTENDED RELEASE ORAL DAILY
Status: DISCONTINUED | OUTPATIENT
Start: 2025-01-31 | End: 2025-02-17

## 2025-01-30 RX ORDER — ONDANSETRON 2 MG/ML
4 INJECTION INTRAMUSCULAR; INTRAVENOUS EVERY 6 HOURS PRN
Status: DISCONTINUED | OUTPATIENT
Start: 2025-01-30 | End: 2025-03-07

## 2025-01-30 RX ORDER — BUDESONIDE AND FORMOTEROL FUMARATE DIHYDRATE 160; 4.5 UG/1; UG/1
2 AEROSOL RESPIRATORY (INHALATION)
Status: DISCONTINUED | OUTPATIENT
Start: 2025-01-30 | End: 2025-02-15

## 2025-01-30 RX ORDER — SODIUM CHLORIDE 9 MG/ML
INJECTION, SOLUTION INTRAVENOUS PRN
Status: DISCONTINUED | OUTPATIENT
Start: 2025-01-30 | End: 2025-03-25 | Stop reason: HOSPADM

## 2025-01-30 RX ORDER — ALBUTEROL SULFATE 0.83 MG/ML
2.5 SOLUTION RESPIRATORY (INHALATION)
Status: DISCONTINUED | OUTPATIENT
Start: 2025-01-30 | End: 2025-02-15

## 2025-01-30 RX ORDER — ACETAMINOPHEN 325 MG/1
650 TABLET ORAL EVERY 6 HOURS PRN
Status: DISCONTINUED | OUTPATIENT
Start: 2025-01-30 | End: 2025-03-25 | Stop reason: HOSPADM

## 2025-01-30 RX ORDER — DEXTROSE MONOHYDRATE 100 MG/ML
INJECTION, SOLUTION INTRAVENOUS CONTINUOUS PRN
Status: DISCONTINUED | OUTPATIENT
Start: 2025-01-30 | End: 2025-01-30

## 2025-01-30 RX ORDER — ACETAMINOPHEN 650 MG/1
650 SUPPOSITORY RECTAL EVERY 6 HOURS PRN
Status: DISCONTINUED | OUTPATIENT
Start: 2025-01-30 | End: 2025-03-25 | Stop reason: HOSPADM

## 2025-01-30 RX ORDER — ONDANSETRON 4 MG/1
4 TABLET, FILM COATED ORAL EVERY 8 HOURS PRN
Status: DISCONTINUED | OUTPATIENT
Start: 2025-01-30 | End: 2025-01-30 | Stop reason: SDUPTHER

## 2025-01-30 RX ORDER — GLUCAGON 1 MG/ML
1 KIT INJECTION PRN
Status: DISCONTINUED | OUTPATIENT
Start: 2025-01-30 | End: 2025-01-30

## 2025-01-30 RX ORDER — IPRATROPIUM BROMIDE AND ALBUTEROL SULFATE 2.5; .5 MG/3ML; MG/3ML
1 SOLUTION RESPIRATORY (INHALATION)
Status: DISCONTINUED | OUTPATIENT
Start: 2025-01-30 | End: 2025-01-30

## 2025-01-30 RX ORDER — INSULIN GLARGINE 100 [IU]/ML
15 INJECTION, SOLUTION SUBCUTANEOUS NIGHTLY
Status: DISCONTINUED | OUTPATIENT
Start: 2025-01-30 | End: 2025-02-13

## 2025-01-30 RX ORDER — IPRATROPIUM BROMIDE AND ALBUTEROL SULFATE 2.5; .5 MG/3ML; MG/3ML
1 SOLUTION RESPIRATORY (INHALATION)
Status: DISCONTINUED | OUTPATIENT
Start: 2025-01-31 | End: 2025-01-30

## 2025-01-30 RX ORDER — ATORVASTATIN CALCIUM 40 MG/1
40 TABLET, FILM COATED ORAL DAILY
Status: DISCONTINUED | OUTPATIENT
Start: 2025-01-31 | End: 2025-03-25 | Stop reason: HOSPADM

## 2025-01-30 RX ORDER — SODIUM CHLORIDE 9 MG/ML
INJECTION, SOLUTION INTRAVENOUS PRN
Status: DISCONTINUED | OUTPATIENT
Start: 2025-01-30 | End: 2025-01-30

## 2025-01-30 RX ORDER — FUROSEMIDE 20 MG/1
20 TABLET ORAL DAILY
Status: DISCONTINUED | OUTPATIENT
Start: 2025-01-31 | End: 2025-02-21

## 2025-01-30 RX ORDER — ALLOPURINOL 300 MG/1
300 TABLET ORAL DAILY
Status: DISCONTINUED | OUTPATIENT
Start: 2025-01-31 | End: 2025-03-25 | Stop reason: HOSPADM

## 2025-01-30 RX ORDER — SODIUM CHLORIDE 0.9 % (FLUSH) 0.9 %
5-40 SYRINGE (ML) INJECTION EVERY 12 HOURS SCHEDULED
Status: DISCONTINUED | OUTPATIENT
Start: 2025-01-30 | End: 2025-02-15

## 2025-01-30 RX ORDER — ONDANSETRON 4 MG/1
4 TABLET, ORALLY DISINTEGRATING ORAL EVERY 8 HOURS PRN
Status: DISCONTINUED | OUTPATIENT
Start: 2025-01-30 | End: 2025-01-30

## 2025-01-30 RX ORDER — FOLIC ACID 1 MG/1
1000 TABLET ORAL DAILY
Status: DISCONTINUED | OUTPATIENT
Start: 2025-01-31 | End: 2025-02-19

## 2025-01-30 RX ORDER — OSELTAMIVIR PHOSPHATE 75 MG/1
75 CAPSULE ORAL ONCE
Status: COMPLETED | OUTPATIENT
Start: 2025-01-30 | End: 2025-01-30

## 2025-01-30 RX ORDER — ACETAMINOPHEN 650 MG/1
650 SUPPOSITORY RECTAL EVERY 6 HOURS PRN
Status: DISCONTINUED | OUTPATIENT
Start: 2025-01-30 | End: 2025-01-30

## 2025-01-30 RX ORDER — METOPROLOL SUCCINATE 25 MG/1
25 TABLET, EXTENDED RELEASE ORAL DAILY
Status: DISCONTINUED | OUTPATIENT
Start: 2025-01-31 | End: 2025-02-04

## 2025-01-30 RX ORDER — ACETAMINOPHEN 325 MG/1
650 TABLET ORAL EVERY 6 HOURS PRN
Status: DISCONTINUED | OUTPATIENT
Start: 2025-01-30 | End: 2025-01-30

## 2025-01-30 RX ORDER — SODIUM CHLORIDE 0.9 % (FLUSH) 0.9 %
5-40 SYRINGE (ML) INJECTION PRN
Status: DISCONTINUED | OUTPATIENT
Start: 2025-01-30 | End: 2025-02-15

## 2025-01-30 RX ORDER — ENOXAPARIN SODIUM 100 MG/ML
40 INJECTION SUBCUTANEOUS DAILY
Status: DISCONTINUED | OUTPATIENT
Start: 2025-01-31 | End: 2025-03-25 | Stop reason: HOSPADM

## 2025-01-30 RX ORDER — DULOXETIN HYDROCHLORIDE 60 MG/1
60 CAPSULE, DELAYED RELEASE ORAL DAILY
Status: DISCONTINUED | OUTPATIENT
Start: 2025-01-31 | End: 2025-03-10

## 2025-01-30 RX ORDER — OSELTAMIVIR PHOSPHATE 75 MG/1
75 CAPSULE ORAL 2 TIMES DAILY
Status: DISCONTINUED | OUTPATIENT
Start: 2025-01-30 | End: 2025-01-31 | Stop reason: SDUPTHER

## 2025-01-30 RX ORDER — CALCIUM CARBONATE 500 MG/1
500 TABLET, CHEWABLE ORAL 3 TIMES DAILY PRN
Status: DISCONTINUED | OUTPATIENT
Start: 2025-01-30 | End: 2025-02-19

## 2025-01-30 RX ORDER — MAGNESIUM SULFATE 1 G/100ML
1000 INJECTION INTRAVENOUS ONCE
Status: COMPLETED | OUTPATIENT
Start: 2025-01-30 | End: 2025-01-30

## 2025-01-30 RX ORDER — POLYETHYLENE GLYCOL 3350 17 G/17G
17 POWDER, FOR SOLUTION ORAL DAILY PRN
Status: DISCONTINUED | OUTPATIENT
Start: 2025-01-30 | End: 2025-01-30

## 2025-01-30 RX ORDER — OXYCODONE HYDROCHLORIDE 5 MG/1
10 TABLET ORAL EVERY 8 HOURS PRN
Status: DISCONTINUED | OUTPATIENT
Start: 2025-01-30 | End: 2025-03-07

## 2025-01-30 RX ORDER — ALBUTEROL SULFATE 0.83 MG/ML
2.5 SOLUTION RESPIRATORY (INHALATION)
Status: DISCONTINUED | OUTPATIENT
Start: 2025-01-30 | End: 2025-01-30

## 2025-01-30 RX ORDER — INSULIN LISPRO 100 [IU]/ML
0-4 INJECTION, SOLUTION INTRAVENOUS; SUBCUTANEOUS
Status: DISCONTINUED | OUTPATIENT
Start: 2025-01-30 | End: 2025-01-30

## 2025-01-30 RX ORDER — DEXTROSE MONOHYDRATE 100 MG/ML
INJECTION, SOLUTION INTRAVENOUS CONTINUOUS PRN
Status: DISCONTINUED | OUTPATIENT
Start: 2025-01-30 | End: 2025-03-25 | Stop reason: HOSPADM

## 2025-01-30 RX ORDER — LANOLIN ALCOHOL/MO/W.PET/CERES
100 CREAM (GRAM) TOPICAL ONCE
Status: COMPLETED | OUTPATIENT
Start: 2025-01-30 | End: 2025-01-30

## 2025-01-30 RX ORDER — ONDANSETRON 2 MG/ML
4 INJECTION INTRAMUSCULAR; INTRAVENOUS EVERY 6 HOURS PRN
Status: DISCONTINUED | OUTPATIENT
Start: 2025-01-30 | End: 2025-01-30

## 2025-01-30 RX ORDER — POTASSIUM CHLORIDE 29.8 MG/ML
20 INJECTION INTRAVENOUS PRN
Status: DISCONTINUED | OUTPATIENT
Start: 2025-01-30 | End: 2025-02-27

## 2025-01-30 RX ORDER — FERROUS SULFATE 325(65) MG
325 TABLET ORAL 2 TIMES DAILY WITH MEALS
Status: DISCONTINUED | OUTPATIENT
Start: 2025-01-31 | End: 2025-02-15

## 2025-01-30 RX ORDER — POLYETHYLENE GLYCOL 3350 17 G/17G
17 POWDER, FOR SOLUTION ORAL DAILY PRN
Status: DISCONTINUED | OUTPATIENT
Start: 2025-01-30 | End: 2025-02-07

## 2025-01-30 RX ORDER — GUAIFENESIN 600 MG/1
600 TABLET, EXTENDED RELEASE ORAL 2 TIMES DAILY
Status: DISCONTINUED | OUTPATIENT
Start: 2025-01-30 | End: 2025-02-08

## 2025-01-30 RX ORDER — BUDESONIDE 0.5 MG/2ML
0.5 INHALANT ORAL
Status: DISCONTINUED | OUTPATIENT
Start: 2025-01-30 | End: 2025-02-03

## 2025-01-30 RX ORDER — BENZONATATE 100 MG/1
100 CAPSULE ORAL 3 TIMES DAILY PRN
Status: DISCONTINUED | OUTPATIENT
Start: 2025-01-30 | End: 2025-02-19

## 2025-01-30 RX ORDER — NICOTINE 21 MG/24HR
1 PATCH, TRANSDERMAL 24 HOURS TRANSDERMAL DAILY
Status: DISCONTINUED | OUTPATIENT
Start: 2025-01-31 | End: 2025-02-04

## 2025-01-30 RX ORDER — LORAZEPAM 2 MG/1
2 TABLET ORAL ONCE
Status: DISCONTINUED | OUTPATIENT
Start: 2025-01-30 | End: 2025-02-08

## 2025-01-30 RX ORDER — ONDANSETRON 4 MG/1
4 TABLET, ORALLY DISINTEGRATING ORAL EVERY 8 HOURS PRN
Status: DISCONTINUED | OUTPATIENT
Start: 2025-01-30 | End: 2025-03-07

## 2025-01-30 RX ORDER — M-VIT,TX,IRON,MINS/CALC/FOLIC 27MG-0.4MG
1 TABLET ORAL ONCE
Status: COMPLETED | OUTPATIENT
Start: 2025-01-30 | End: 2025-01-30

## 2025-01-30 RX ORDER — SODIUM CHLORIDE 0.9 % (FLUSH) 0.9 %
5-40 SYRINGE (ML) INJECTION PRN
Status: DISCONTINUED | OUTPATIENT
Start: 2025-01-30 | End: 2025-01-30

## 2025-01-30 RX ORDER — PREDNISONE 20 MG/1
40 TABLET ORAL DAILY
Status: DISCONTINUED | OUTPATIENT
Start: 2025-02-02 | End: 2025-01-30

## 2025-01-30 RX ORDER — INSULIN LISPRO 100 [IU]/ML
0-4 INJECTION, SOLUTION INTRAVENOUS; SUBCUTANEOUS
Status: DISCONTINUED | OUTPATIENT
Start: 2025-01-30 | End: 2025-02-17

## 2025-01-30 RX ORDER — MAGNESIUM SULFATE IN WATER 40 MG/ML
2000 INJECTION, SOLUTION INTRAVENOUS PRN
Status: DISCONTINUED | OUTPATIENT
Start: 2025-01-30 | End: 2025-03-25 | Stop reason: HOSPADM

## 2025-01-30 RX ORDER — ENOXAPARIN SODIUM 100 MG/ML
40 INJECTION SUBCUTANEOUS DAILY
Status: DISCONTINUED | OUTPATIENT
Start: 2025-01-31 | End: 2025-01-30

## 2025-01-30 RX ORDER — POTASSIUM CHLORIDE 7.45 MG/ML
10 INJECTION INTRAVENOUS PRN
Status: DISCONTINUED | OUTPATIENT
Start: 2025-01-30 | End: 2025-03-25 | Stop reason: HOSPADM

## 2025-01-30 RX ADMIN — SODIUM CHLORIDE, PRESERVATIVE FREE 10 ML: 5 INJECTION INTRAVENOUS at 22:38

## 2025-01-30 RX ADMIN — FOLIC ACID 1 MG: 1 TABLET ORAL at 18:44

## 2025-01-30 RX ADMIN — WATER 40 MG: 1 INJECTION INTRAMUSCULAR; INTRAVENOUS; SUBCUTANEOUS at 22:43

## 2025-01-30 RX ADMIN — Medication 100 MG: at 18:44

## 2025-01-30 RX ADMIN — SODIUM CHLORIDE 1000 MG: 900 INJECTION INTRAVENOUS at 18:30

## 2025-01-30 RX ADMIN — IPRATROPIUM BROMIDE AND ALBUTEROL SULFATE 1 DOSE: 2.5; .5 SOLUTION RESPIRATORY (INHALATION) at 18:02

## 2025-01-30 RX ADMIN — ALBUTEROL SULFATE 2.5 MG: 2.5 SOLUTION RESPIRATORY (INHALATION) at 22:20

## 2025-01-30 RX ADMIN — Medication 1 TABLET: at 18:44

## 2025-01-30 RX ADMIN — OSELTAMIVIR PHOSPHATE 75 MG: 75 CAPSULE ORAL at 19:23

## 2025-01-30 RX ADMIN — BUDESONIDE 500 MCG: 0.5 INHALANT RESPIRATORY (INHALATION) at 22:20

## 2025-01-30 RX ADMIN — AZITHROMYCIN MONOHYDRATE 500 MG: 500 INJECTION, POWDER, LYOPHILIZED, FOR SOLUTION INTRAVENOUS at 19:17

## 2025-01-30 RX ADMIN — MAGNESIUM SULFATE IN DEXTROSE 1000 MG: 10 INJECTION, SOLUTION INTRAVENOUS at 18:43

## 2025-01-30 ASSESSMENT — LIFESTYLE VARIABLES
HOW OFTEN DO YOU HAVE A DRINK CONTAINING ALCOHOL: MONTHLY OR LESS
HOW MANY STANDARD DRINKS CONTAINING ALCOHOL DO YOU HAVE ON A TYPICAL DAY: 1 OR 2
HOW MANY STANDARD DRINKS CONTAINING ALCOHOL DO YOU HAVE ON A TYPICAL DAY: PATIENT DOES NOT DRINK
HOW OFTEN DO YOU HAVE A DRINK CONTAINING ALCOHOL: NEVER

## 2025-01-30 NOTE — ED PROVIDER NOTES
CC:   Chief Complaint   Patient presents with    Shortness of Breath     Shortness of breath that started yesterday.  88% on 4lpm (baseline).  Non-rebreather placed.  10mg morphine given by squad for back pain.         HPI:  Sandor is a 69 y.o. male with a history of COPD on 4 L of O2 chronically, GERD, hyperlipidemia, alcohol liver cirrhosis, who presents to the emergency department by EMS for shortness of breath.  Patient reports that he has felt short of breath with increasing cough and fever over the past 3 to 4 days.  Upon EMS arrival patient was in moderate to severe respiratory distress O2 sat 88%.  He was placed on O2.  He received Solu-Medrol and DuoNeb.  EMS team stated that he was agitated trying to get out of the squad and complaining of diffuse pain back legs etc. he has a history of neuropathy.  They then gave him 10 mg of morphine.  Upon arrival patient is ill-appearing in respiratory distress poor skin color very tachypneic retractions.  He denies chest pain.    Patient has a history of alcohol use disorder.  He tells me he now only drinks 1 sometimes 2 beers a day.  He denies getting shakes if he does not drink.    He was quite tachypneic.  Tachycardic with a heart rate in the 130s.  Respiratory rate around 28.  Sat was 98% on a nonrebreather    History obtained via the patient  EMS  External records reviewed    ROS:  All Pertinent ROS Negative Unless otherwise stated within HPI.    VITALS:  Vitals:    01/30/25 1820   BP: (!) 140/81   Pulse: (!) 127   Resp: 24   Temp:    SpO2:         PHYSICAL EXAM:      Vital signs reviewed  General: Ill-appearing   Acute respiratory distress  vitals:  Vital signs reviewed, see nurses notes  Neck:  No JVD, or lymphadenopathy.  Cardiovascular: Tachycardic normal sounds and absence of murmurs, rubs or gallops.  Lungs: Severe respiratory distress tachypneic, mottled appearing retractions initially able to only say 1 or 2 words.  Decreased breath sounds throughout.  range)   magnesium sulfate 1000 mg in dextrose 5% 100 mL IVPB (1,000 mg IntraVENous New Bag 1/30/25 1843)   LORazepam (ATIVAN) tablet 2 mg (has no administration in time range)   oseltamivir (TAMIFLU) capsule 75 mg (has no administration in time range)   cefTRIAXone (ROCEPHIN) 1,000 mg in sodium chloride 0.9 % 50 mL IVPB (mini-bag) (1,000 mg IntraVENous New Bag 1/30/25 1830)   thiamine tablet 100 mg (100 mg Oral Given 1/30/25 1844)   therapeutic multivitamin-minerals 1 tablet (1 tablet Oral Given 1/30/25 1844)   folic acid (FOLVITE) tablet 1 mg (1 mg Oral Given 1/30/25 1844)        Prescriptions Written:    New Prescriptions    No medications on file         Problems Addressed This Visit:      1.  Respiratory failure: History of O2 dependent COPD chronically on 4 L  -COPD exacerbation precipitated by influenza A.  Received Tamiflu.  -Much improved on BiPAP  -Respiratory acidosis with a pH of 7.29, CO2 63  -No radiographic pneumonia but cover for community-acquired pneumonia with Rocephin and azithromycin  -No signs of sepsis no leukocytosis normal lactic acid    2.  Influenza A positive.  Received Tamiflu    3.  History of alcohol use disorder: Patient states he now only drinks 1 maybe 2 beers a day.  No sign of tremors.  Suspect tachycardia is respiratory related from respiratory distress not withdrawal.  -Watch for signs of withdrawal  -Received p.o. rally pack, p.o. Ativan as a precaution      4.  Hypomagnesemia with a magnesium of 1.3.  Replaced with IV magnesium    Discussed Patient with another provider(s) and Healthcare Team:   Discussed with hospitalist, also discussed with ICU attending Dr. Joyce        IMPRESSION:  1. COPD exacerbation (HCC)    2. Acute on chronic respiratory failure with hypoxia and hypercapnia    3. Alcohol use disorder    4. Hypomagnesemia    5. Influenza A           Critical Care Time: 60 minutes     DISPOSITION: ICU [place]    Condition at Discharge/Transfer from

## 2025-01-30 NOTE — PLAN OF CARE
Transfer from [facility].  Acute on chronic hypoxic respiratory failure, typically on 4L, currently requiring BiPAP.  COPD exacerbation.  Influenza A.  Hypercalcemia.  -admit ICU.  -pulm/critical care consulted.  -ABG upon arrival.  -ionized calcium ordered.  -respiratory studies ordered.  -IV solumedrol.  -continue azithromycin.  -tamiflu.  -nursing communication for home med rec, please notify on-call provider once complete.  -further recommendations pending provider assessment on arrival.    Erin Saenz PA-C 1/30/2025 6:34 PM

## 2025-01-31 PROBLEM — J96.22 ACUTE ON CHRONIC RESPIRATORY FAILURE WITH HYPOXIA AND HYPERCAPNIA: Status: ACTIVE | Noted: 2023-09-06

## 2025-01-31 PROBLEM — J10.1 INFLUENZA A: Status: ACTIVE | Noted: 2025-01-31

## 2025-01-31 LAB
ANION GAP SERPL CALCULATED.3IONS-SCNC: 12 MMOL/L (ref 3–16)
BASOPHILS # BLD: 0 K/UL (ref 0–0.2)
BASOPHILS NFR BLD: 0.5 %
BUN SERPL-MCNC: 19 MG/DL (ref 7–20)
CALCIUM SERPL-MCNC: 10.2 MG/DL (ref 8.3–10.6)
CHLORIDE SERPL-SCNC: 95 MMOL/L (ref 99–110)
CO2 SERPL-SCNC: 29 MMOL/L (ref 21–32)
CREAT SERPL-MCNC: 0.9 MG/DL (ref 0.8–1.3)
DEPRECATED RDW RBC AUTO: 15.7 % (ref 12.4–15.4)
EOSINOPHIL # BLD: 0 K/UL (ref 0–0.6)
EOSINOPHIL NFR BLD: 0.1 %
EST. AVERAGE GLUCOSE BLD GHB EST-MCNC: 108.3 MG/DL
GFR SERPLBLD CREATININE-BSD FMLA CKD-EPI: >90 ML/MIN/{1.73_M2}
GLUCOSE BLD-MCNC: 124 MG/DL (ref 70–99)
GLUCOSE BLD-MCNC: 137 MG/DL (ref 70–99)
GLUCOSE BLD-MCNC: 150 MG/DL (ref 70–99)
GLUCOSE BLD-MCNC: 162 MG/DL (ref 70–99)
GLUCOSE BLD-MCNC: 186 MG/DL (ref 70–99)
GLUCOSE SERPL-MCNC: 181 MG/DL (ref 70–99)
HBA1C MFR BLD: 5.4 %
HCT VFR BLD AUTO: 43.4 % (ref 40.5–52.5)
HGB BLD-MCNC: 14.6 G/DL (ref 13.5–17.5)
LYMPHOCYTES # BLD: 0.2 K/UL (ref 1–5.1)
LYMPHOCYTES NFR BLD: 3.8 %
MCH RBC QN AUTO: 31.8 PG (ref 26–34)
MCHC RBC AUTO-ENTMCNC: 33.7 G/DL (ref 31–36)
MCV RBC AUTO: 94.4 FL (ref 80–100)
MONOCYTES # BLD: 0.1 K/UL (ref 0–1.3)
MONOCYTES NFR BLD: 3.3 %
NEUTROPHILS # BLD: 3.8 K/UL (ref 1.7–7.7)
NEUTROPHILS NFR BLD: 92.3 %
PERFORMED ON: ABNORMAL
PLATELET # BLD AUTO: 131 K/UL (ref 135–450)
PMV BLD AUTO: 8.2 FL (ref 5–10.5)
POTASSIUM SERPL-SCNC: 4 MMOL/L (ref 3.5–5.1)
RBC # BLD AUTO: 4.6 M/UL (ref 4.2–5.9)
SODIUM SERPL-SCNC: 136 MMOL/L (ref 136–145)
WBC # BLD AUTO: 4.1 K/UL (ref 4–11)

## 2025-01-31 PROCEDURE — 6360000002 HC RX W HCPCS

## 2025-01-31 PROCEDURE — 6370000000 HC RX 637 (ALT 250 FOR IP)

## 2025-01-31 PROCEDURE — 85025 COMPLETE CBC W/AUTO DIFF WBC: CPT

## 2025-01-31 PROCEDURE — 36415 COLL VENOUS BLD VENIPUNCTURE: CPT

## 2025-01-31 PROCEDURE — 1200000000 HC SEMI PRIVATE

## 2025-01-31 PROCEDURE — 92526 ORAL FUNCTION THERAPY: CPT

## 2025-01-31 PROCEDURE — 87449 NOS EACH ORGANISM AG IA: CPT

## 2025-01-31 PROCEDURE — 6360000002 HC RX W HCPCS: Performed by: INTERNAL MEDICINE

## 2025-01-31 PROCEDURE — 80048 BASIC METABOLIC PNL TOTAL CA: CPT

## 2025-01-31 PROCEDURE — 92610 EVALUATE SWALLOWING FUNCTION: CPT

## 2025-01-31 PROCEDURE — 99232 SBSQ HOSP IP/OBS MODERATE 35: CPT | Performed by: INTERNAL MEDICINE

## 2025-01-31 PROCEDURE — 83036 HEMOGLOBIN GLYCOSYLATED A1C: CPT

## 2025-01-31 PROCEDURE — 6370000000 HC RX 637 (ALT 250 FOR IP): Performed by: INTERNAL MEDICINE

## 2025-01-31 PROCEDURE — 94761 N-INVAS EAR/PLS OXIMETRY MLT: CPT

## 2025-01-31 PROCEDURE — 2500000003 HC RX 250 WO HCPCS

## 2025-01-31 PROCEDURE — 51798 US URINE CAPACITY MEASURE: CPT

## 2025-01-31 PROCEDURE — 51701 INSERT BLADDER CATHETER: CPT

## 2025-01-31 PROCEDURE — 94640 AIRWAY INHALATION TREATMENT: CPT

## 2025-01-31 PROCEDURE — 2700000000 HC OXYGEN THERAPY PER DAY

## 2025-01-31 PROCEDURE — 99223 1ST HOSP IP/OBS HIGH 75: CPT | Performed by: INTERNAL MEDICINE

## 2025-01-31 RX ORDER — OSELTAMIVIR PHOSPHATE 75 MG/1
75 CAPSULE ORAL 2 TIMES DAILY
Status: DISCONTINUED | OUTPATIENT
Start: 2025-01-31 | End: 2025-02-01 | Stop reason: ALTCHOICE

## 2025-01-31 RX ORDER — DILTIAZEM HYDROCHLORIDE 120 MG/1
180 TABLET, FILM COATED ORAL DAILY
Status: ON HOLD | COMMUNITY
End: 2025-03-24 | Stop reason: HOSPADM

## 2025-01-31 RX ORDER — OSELTAMIVIR PHOSPHATE 75 MG/1
75 CAPSULE ORAL 2 TIMES DAILY
Qty: 10 CAPSULE | Refills: 0 | Status: SHIPPED | OUTPATIENT
Start: 2025-01-31 | End: 2025-03-24 | Stop reason: HOSPADM

## 2025-01-31 RX ADMIN — ATORVASTATIN CALCIUM 40 MG: 40 TABLET, FILM COATED ORAL at 08:40

## 2025-01-31 RX ADMIN — METOPROLOL SUCCINATE 25 MG: 25 TABLET, EXTENDED RELEASE ORAL at 08:40

## 2025-01-31 RX ADMIN — FERROUS SULFATE TAB 325 MG (65 MG ELEMENTAL FE) 325 MG: 325 (65 FE) TAB at 08:41

## 2025-01-31 RX ADMIN — BUDESONIDE AND FORMOTEROL FUMARATE DIHYDRATE 2 PUFF: 160; 4.5 AEROSOL RESPIRATORY (INHALATION) at 07:26

## 2025-01-31 RX ADMIN — WATER 40 MG: 1 INJECTION INTRAMUSCULAR; INTRAVENOUS; SUBCUTANEOUS at 04:19

## 2025-01-31 RX ADMIN — DILTIAZEM HYDROCHLORIDE 180 MG: 180 CAPSULE, COATED, EXTENDED RELEASE ORAL at 08:40

## 2025-01-31 RX ADMIN — OSELTAMIVIR PHOSPHATE 75 MG: 75 CAPSULE ORAL at 20:21

## 2025-01-31 RX ADMIN — BENZONATATE 100 MG: 100 CAPSULE ORAL at 20:21

## 2025-01-31 RX ADMIN — FUROSEMIDE 20 MG: 20 TABLET ORAL at 08:40

## 2025-01-31 RX ADMIN — TIOTROPIUM BROMIDE INHALATION SPRAY 2 PUFF: 3.12 SPRAY, METERED RESPIRATORY (INHALATION) at 07:26

## 2025-01-31 RX ADMIN — BUDESONIDE 500 MCG: 0.5 INHALANT RESPIRATORY (INHALATION) at 20:03

## 2025-01-31 RX ADMIN — GABAPENTIN 800 MG: 400 CAPSULE ORAL at 17:03

## 2025-01-31 RX ADMIN — FOLIC ACID 1000 MCG: 1 TABLET ORAL at 08:40

## 2025-01-31 RX ADMIN — FERROUS SULFATE TAB 325 MG (65 MG ELEMENTAL FE) 325 MG: 325 (65 FE) TAB at 17:03

## 2025-01-31 RX ADMIN — GABAPENTIN 800 MG: 400 CAPSULE ORAL at 20:13

## 2025-01-31 RX ADMIN — ENOXAPARIN SODIUM 40 MG: 100 INJECTION SUBCUTANEOUS at 08:41

## 2025-01-31 RX ADMIN — WATER 40 MG: 1 INJECTION INTRAMUSCULAR; INTRAVENOUS; SUBCUTANEOUS at 23:15

## 2025-01-31 RX ADMIN — GUAIFENESIN 600 MG: 600 TABLET, EXTENDED RELEASE ORAL at 08:40

## 2025-01-31 RX ADMIN — GUAIFENESIN 600 MG: 600 TABLET, EXTENDED RELEASE ORAL at 20:13

## 2025-01-31 RX ADMIN — WATER 40 MG: 1 INJECTION INTRAMUSCULAR; INTRAVENOUS; SUBCUTANEOUS at 08:40

## 2025-01-31 RX ADMIN — SODIUM CHLORIDE, PRESERVATIVE FREE 10 ML: 5 INJECTION INTRAVENOUS at 08:39

## 2025-01-31 RX ADMIN — BUDESONIDE AND FORMOTEROL FUMARATE DIHYDRATE 2 PUFF: 160; 4.5 AEROSOL RESPIRATORY (INHALATION) at 20:03

## 2025-01-31 RX ADMIN — INSULIN GLARGINE 15 UNITS: 100 INJECTION, SOLUTION SUBCUTANEOUS at 20:13

## 2025-01-31 RX ADMIN — AZITHROMYCIN DIHYDRATE 500 MG: 250 TABLET ORAL at 08:40

## 2025-01-31 RX ADMIN — OSELTAMIVIR PHOSPHATE 75 MG: 75 CAPSULE ORAL at 08:40

## 2025-01-31 RX ADMIN — Medication 5000 UNITS: at 08:40

## 2025-01-31 RX ADMIN — SODIUM CHLORIDE, PRESERVATIVE FREE 10 ML: 5 INJECTION INTRAVENOUS at 20:21

## 2025-01-31 RX ADMIN — BUDESONIDE 500 MCG: 0.5 INHALANT RESPIRATORY (INHALATION) at 07:26

## 2025-01-31 RX ADMIN — GABAPENTIN 800 MG: 400 CAPSULE ORAL at 08:40

## 2025-01-31 RX ADMIN — DULOXETINE 60 MG: 60 CAPSULE, DELAYED RELEASE ORAL at 08:40

## 2025-01-31 RX ADMIN — GABAPENTIN 800 MG: 400 CAPSULE ORAL at 13:02

## 2025-01-31 RX ADMIN — WATER 40 MG: 1 INJECTION INTRAMUSCULAR; INTRAVENOUS; SUBCUTANEOUS at 17:03

## 2025-01-31 RX ADMIN — ALLOPURINOL 300 MG: 300 TABLET ORAL at 08:40

## 2025-01-31 ASSESSMENT — PAIN SCALES - GENERAL
PAINLEVEL_OUTOF10: 0

## 2025-01-31 NOTE — ED NOTES
Unable to verify medication list since pharmacy is closed.  Patient is slightly altered and is not reliable for this information right now.  Patient's step-son stated no one else would be able to verify medications but patient was just [place] [facility] and it should all be up to date. :)  Roz Holloway RN

## 2025-01-31 NOTE — PROGRESS NOTES
Speech Language Pathology  Swallowing Disorders and Dysphagia  Clinical Bedside Swallow Assessment  Facility/Department: [facility] [place] [facility]    Instrumentation: Not clinically indicated at this time. Will continue to monitor  Diet recommendation: IDDSI 5 Minced and moist Solids; IDDSI 0 Thin Liquids; Meds PO as tolerated, consider meds whole in puree as needed  Risk management: upright for all intake, stay upright for at least 30 mins after intake, small bites/sips, oral care 2-3x/day to reduce adverse affects in the event of aspiration, increase physical mobility as able, slow rate of intake, general GERD precautions, general aspiration precautions, and hold PO and contact SLP if s/s of aspiration or worsening respiratory status develop.    NAME:Sandor Early  : 1955 (69 y.o.)   MRN: [id number]  [place] [facility] [place] [facility] [place]  ADMISSION DATE: 2025  Chief Complaint   Patient presents with    Shortness of Breath     Shortness of breath that started yesterday.  88% on 4lpm (baseline).  Non-rebreather placed.  10mg morphine given by squad for back pain.      Past Medical History:   Diagnosis Date    Bladder outlet obstruction 2017    Carpal tunnel syndrome of left wrist 2013    Cellulitis of right lower extremity 2023    Cellulitis of right upper extremity 2023    w/ Group A Strep bacteremia    COPD exacerbation (HCC) 2023    Cough syncope 01/10/2023    Diabetic ketoacidosis without coma associated with type 2 diabetes mellitus (HCC) 2018    GI bleed 2022    Gout 2013    Hilar adenopathy     Iron deficiency anemia     Malignant neoplasm of urinary bladder (HCC) 2016    Multiple duodenal ulcers     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 2020    Ulnar nerve entrapment 2016    Uncontrolled    Secretions: manages independently  Vocal quality: See CN exam above  MPT: See CN exam above  S/Z ratio: 1:1   Pitch range: See CN exam above  Cough: See CN exam above    Oral Care Status:    Edentulous    Oral Care Completed?   [x] Yes   [] No  Completed by pt after set up by SLP via pasted toothbrush    PO trials:   Baseline cough noted prior to PO trials? [x] Yes   [] No  Baseline SPO2%: 94  Baseline RR: 18  Supplemental O2 Status: 4 LPM NC    Ice: no anterior bolus loss , suspect functional A-P bolus transit, swallow timing subjectively appears timely, oral clearance grossly WFL, and no clinical s/s of aspiration    IDDSI 0 (thin):   - 5cc bolus (tsp): no anterior bolus loss , suspect functional A-P bolus transit, swallow timing subjectively appears timely, oral clearance grossly WFL, and no clinical s/s of aspiration  - Cup: no anterior bolus loss , suspect functional A-P bolus transit, swallow timing subjectively appears timely, oral clearance grossly WFL, and no clinical s/s of aspiration. Pt noted to belch frequently after the swallow  - Straw: no anterior bolus loss , suspect functional A-P bolus transit, swallow timing subjectively appears timely, oral clearance grossly WFL, and no clinical s/s of aspiration. Pt noted to belch frequently after the swallow    IDDSI 2 (mildly thick): DNT    IDDSI 3 (moderately thick): DNT    IDDSI 4 (puree): no anterior bolus loss , suspect functional A-P bolus transit, swallow timing subjectively appears timely, no clinical s/s of aspiration, and coughing after the swallow    IDDSI 5 (minced and moist): no anterior bolus loss , suspect functional A-P bolus transit, swallow timing subjectively appears timely, grossly functional mastication, oral clearance grossly WFL, and no clinical s/s of aspiration    IDDSI 6 (soft and bite sized): DNT    IDDSI 7 (regular): DNT    3 oz water: PASS    SPO2% following trials: 95  RR following trials: 20    Impressions:    Patient presents

## 2025-01-31 NOTE — PLAN OF CARE
Problem: Chronic Conditions and Co-morbidities  Goal: Patient's chronic conditions and co-morbidity symptoms are monitored and maintained or improved  Outcome: Progressing     Problem: Discharge Planning  Goal: Discharge to home or other facility with appropriate resources  Outcome: Progressing  Flowsheets (Taken 1/30/2025 2215 by Gia Panchal, RN)  Discharge to home or other facility with appropriate resources:   Identify barriers to discharge with patient and caregiver   Identify discharge learning needs (meds, wound care, etc)   Arrange for needed discharge resources and transportation as appropriate     Problem: Safety - Adult  Goal: Free from fall injury  Outcome: Progressing

## 2025-01-31 NOTE — CONSULTS
Pharmacy Medication Reconciliation     Medication list reviewed through [employer] and contacting [facility] pharmacy.    Allergy Update: Lisinopril, Ace inhibitors, Influenza vaccines, Tiotropium bromide monohydrate, and Vilanterol    Recommendations/Findings:   The following amendments were made to the patient's active medication list on file:   1) Additions:     N/A    2) Deletions:     N/A    3) Changes:     Allopurinol 300 mg daily  Atorvastatin 40 mg  Diltiazem  mg daily -> Diltiazem  mg daily    PTA medication list was corrected to the following:     Prior to Admission Medications   Prescriptions Last Dose Informant   Alcohol Swabs PADS Past Week    Sig: USE AS DIRECTED TO TEST AND INSULIN   Cholecalciferol (VITAMIN D3) 125 MCG (5000 UT) CAPS Past Week    Sig: TAKE ONE (1) CAPSULE BY MOUTH DAILY   DULoxetine (CYMBALTA) 60 MG extended release capsule 1/30/2025    Sig: TAKE ONE CAPSULE BY MOUTH EVERY DAY   FEROSUL 325 (65 Fe) MG tablet 1/30/2025    Sig: TAKE ONE (1) TABLET BY MOUTH DAILY WITH BREAKFAST   TRUE METRIX BLOOD GLUCOSE TEST strip Past Week    Sig: USE TWICE DAILY   UNIFINE PENTIPS 31G X 8 MM MISC Past Week    Sig: USE ONCE DAILY WITH INJECTION   albuterol sulfate HFA (PROVENTIL;VENTOLIN;PROAIR) 108 (90 Base) MCG/ACT inhaler Past Week    Sig: INHALE TWO (2) PUFFS EVERY SIX (6) HOURS AS NEEDED FOR WHEEZING   allopurinol (ZYLOPRIM) 300 MG tablet 1/30/2025    Sig: TAKE ONE (1) TABLET BY MOUTH DAILY   Patient taking differently: Take 1 tablet by mouth daily   atorvastatin (LIPITOR) 40 MG tablet 1/30/2025    Sig: TAKE ONE (1) TABLET BY MOUTH DAILY   Patient taking differently: Take 1 tablet by mouth daily   calcium carbonate (TUMS) 500 MG chewable tablet Past Week    Sig: Take 1 tablet by mouth 3 times daily as needed for Heartburn   dilTIAZem (CARDIZEM CD) 180 MG extended release capsule Not Taking    Sig: Take 1 capsule by mouth daily   Patient not taking: Reported on 1/31/2025   dilTIAZem

## 2025-01-31 NOTE — PROGRESS NOTES
Pt arrived to unit with [facility]. Pt alert but poor historian,  unsure of home meds. DR Richey at bedside. Placed male wick . Call light and bedside table in reach. Patient is not able to demonstrate the ability to move from a reclining position to an upright position within the recliner due to shortness of breath .

## 2025-01-31 NOTE — PROGRESS NOTES
Patient had to have a straight catheter again this admission, 1st time this shift. Patient resting in bed and denies any needs at this time.

## 2025-01-31 NOTE — PROGRESS NOTES
4 Eyes Skin Assessment     NAME:  Sandor Early  YOB: 1955  MEDICAL RECORD NUMBER:  [id number]    The patient is being assessed for  Admission    I agree that at least one RN has performed a thorough Head to Toe Skin Assessment on the patient. ALL assessment sites listed below have been assessed.      Areas assessed by both nurses:    Head, Face, Ears, Shoulders, Back, Chest, Arms, Elbows, Hands, Sacrum. Buttock, Coccyx, Ischium, and Legs. Feet and Heels        Does the Patient have a Wound? No noted wound(s)  Scattered bruises abrasions        Abraham Prevention initiated by RN: yes  Wound Care Orders initiated by RN: No    Pressure Injury (Stage 3,4, Unstageable, DTI, NWPT, and Complex wounds) if present, place Wound referral order by RN under : No    New Ostomies, if present place, Ostomy referral order under : No     Nurse 1 eSignature: Electronically signed by Charissa Daugherty RN on 1/31/25 at 4:23 AM EST    **SHARE this note so that the co-signing nurse can place an eSignature**    Nurse 2 eSignature: {Esignature:[id number]}

## 2025-01-31 NOTE — CARE COORDINATION
Case Management Assessment  Initial Evaluation    Date/Time of Evaluation: 1/31/2025 3:51 PM  Assessment Completed by: Radha Veronica RN    If patient is discharged prior to next notation, then this note serves as note for discharge by case management.    Patient Name: Sandor Early                   YOB: 1955  Diagnosis: Hypomagnesemia [E83.42]  Influenza A [J10.1]  COPD exacerbation (HCC) [J44.1]  Respiratory failure with hypoxia [J96.91]  Alcohol use disorder [F10.90]  Acute on chronic respiratory failure with hypoxia and hypercapnia [J96.21, J96.22]  Acute respiratory failure [J96.00]                   Date / Time: 1/30/2025  5:28 PM    Patient Admission Status: Inpatient   Readmission Risk (Low < 19, Mod (19-27), High > 27): Readmission Risk Score: 19.6    Current PCP: Juliet Mayorga MD  PCP verified by CM? Yes ([place])    Chart Reviewed: Yes      History Provided by: Patient  Patient Orientation: Alert and Oriented    Patient Cognition: Alert    Hospitalization in the last 30 days (Readmission):  Yes    If yes, Readmission Assessment in CM Navigator will be completed.    Advance Directives:      Code Status: Full Code   Patient's Primary Decision Maker is: Legal Next of Kin    Primary Decision Maker: Reagan Early - Brother/Sister - [phone]    Discharge Planning:    Patient lives with: Children, Family Members Type of Home: Trailer/Mobile Home  Primary Care Giver: Self  Patient Support Systems include: Children, Family Members   Current Financial resources: Other (Comment) ([employer] Medicare, Medicaid)  Current community resources: None  Current services prior to admission: Durable Medical Equipment, Oxygen Therapy ([employer] home O2, HHN)            Current DME: Cane, Walker, Oxygen Therapy (Comment)            Type of Home Care services:  None    ADLS  Prior functional level: Assistance with the following:, Housework, Cooking, Shopping  Current functional level: Assistance with the  [J96.91]  Alcohol use disorder [F10.90]  Acute on chronic respiratory failure with hypoxia and hypercapnia [J96.21, J96.22]  Acute respiratory failure [J96.00]    IF APPLICABLE: The Patient and/or patient representative Sandor and his family were provided with a choice of provider and agrees with the discharge plan. Freedom of choice list with basic dialogue that supports the patient's individualized plan of care/goals and shares the quality data associated with the providers was provided to:     Patient Representative Name:       The Patient and/or Patient Representative Agree with the Discharge Plan?      Radha Veronica RN  Case Management Department  Ph: [phone] Fax: [phone]

## 2025-01-31 NOTE — PROGRESS NOTES
Shift assessment complete see flow sheet respirations easy and even. Patient denies any pain or needs at this time. Bed is locked in the lowest position with call light within reach.  Patient did start coughing after he had breakfast, oxygen dropped but quickly recovered above 90% O2 sat. I spoke with Dr. Jalloh and he ordered to have an SLP treatment and eval.

## 2025-01-31 NOTE — PROGRESS NOTES
Reassessment completed (see Flowsheet )   Pt resting in beds Sp02 95 % on 5 L . Male wick in place All ICU lines and monitoring  in place .

## 2025-01-31 NOTE — H&P
V2.0  History and Physical      Name:  Sandor Early /Age/Sex: 1955  (69 y.o. male)   MRN & CSN:  [id number] & [id number] Encounter Date/Time: 2025 10:26 PM EST   Location:  3013/3013-01 PCP: Juliet Mayorga MD       Hospital Day: 1    Assessment and Plan:   Sandor Early is a 69 y.o. male with a pmh of COPD, chronic respiratory failure, gastroesophageal reflux disease, hyperlipidemia, alcohol liver cirrhosis, who presents with Respiratory failure with hypoxia    Hospital Problems             Last Modified POA    * (Principal) Respiratory failure with hypoxia 2025 Yes    Acute respiratory failure 2025 Yes   Influenza A infection  COPD exacerbation  Chronic respiratory failure.  Hyperlipidemia  History of alcoholic liver cirrhosis  Gastroesophageal reflux disease    Plan:  Patient was transferred to the ICU on account of noninvasive ventilation.  Patient was transitioned to nasal cannulae oxygen we will continue to wean FiO2 as tolerated goal is to keep sats greater than 88%.  Continue with Tamiflu  Continue ceftriaxone and azithromycin  5.   Methylprednisolone 40 mg Q8  6. Continue duonebs  Lovenox for DVT prophylaxis  Initiated cardiac diet    Disposition:   Current Living situation: Lives in the community with family  Expected Disposition: Home  Estimated D/C: 3    Diet ADULT DIET; Regular; 3 carb choices (45 gm/meal)   DVT Prophylaxis [x] Lovenox, []  Heparin, [] SCDs, [] Ambulation,  [] Eliquis, [] Xarelto, [] Coumadin   Code Status Full Code   Surrogate Decision Maker/ POA      Personally reviewed Lab Studies and Imaging     Discussed management of the case with patient and bed side nurses     EKG interpreted personally and results Sinus tachycardia      History from:     patient    History of Present Illness:     Chief Complaint: Shortness of breath  Sandor Early is a 69 y.o. male with pmh of COPD,CRF on 4L, gastroesophageal reflux disease, hyperlipidemia, alcoholic liver

## 2025-01-31 NOTE — PROGRESS NOTES
Admit: 2025    Name:  Sandor Early  Room:  [address] [place] [address]  MRN:    [id number]    Critical Care Daily Progress Note for 2025   Admitted with influenza A,acute copd exacerbation  Placed on BiPAP  Interval History:   Used BiPAP at night   Now on 3 L of O2   Feels better   Scheduled Meds:   oseltamivir  75 mg Oral BID    LORazepam  2 mg Oral Once    sodium chloride flush  5-40 mL IntraVENous 2 times per day    enoxaparin  40 mg SubCUTAneous Daily    guaiFENesin  600 mg Oral BID    methylPREDNISolone  40 mg IntraVENous Q6H    azithromycin  500 mg Oral Daily    insulin lispro  0-4 Units SubCUTAneous 4x Daily AC & HS    nicotine  1 patch TransDERmal Daily    budesonide  0.5 mg Nebulization BID RT    gabapentin  800 mg Oral 4x Daily    dilTIAZem  180 mg Oral Daily    allopurinol  300 mg Oral Daily    vitamin D3  5,000 Units Oral Daily    furosemide  20 mg Oral Daily    folic acid  1,000 mcg Oral Daily    ferrous sulfate  325 mg Oral BID WC    insulin glargine  15 Units SubCUTAneous Nightly    DULoxetine  60 mg Oral Daily    atorvastatin  40 mg Oral Daily    metoprolol succinate  25 mg Oral Daily    budesonide-formoterol  2 puff Inhalation BID RT    And    tiotropium  2 puff Inhalation Daily RT       Continuous Infusions:   sodium chloride      dextrose         PRN Meds:  sodium chloride flush, sodium chloride, ondansetron **OR** ondansetron, acetaminophen **OR** acetaminophen, benzonatate, albuterol, dextrose bolus **OR** dextrose bolus, glucagon (rDNA), dextrose, potassium chloride **OR** potassium chloride, magnesium sulfate, polyethylene glycol, calcium carbonate, oxyCODONE HCl, glucose                  Objective:     Temp  Av °F (37.2 °C)  Min: 98.3 °F (36.8 °C)  Max: 100.1 °F (37.8 °C)  Pulse  Av.6  Min: 99  Max: 131  BP  Min: 116/98  Max: 188/93  SpO2  Av %  Min: 94 %  Max: 100 %  Patient Vitals for the past 4 hrs:   BP Temp Temp src Pulse Resp SpO2 Weight   25 0745 -- 98.4 °F (36.9 °C)

## 2025-01-31 NOTE — CONSULTS
P [facility]                                 Pulmonary/Critical care  Consult /Progress Note :                                                                  CC :worsening SOB   Patient is being seen at the request of Dr Richey  for a consultation for Acute hypoxic ,hypercapnic resp failure    HISTORY OF PRESENT ILLNESS:   Patient is  69 y.o. male with pmh of COPD,CRF on 4L, 50 PPY smoking history , also known alcoholic liver cirrhosis who presented  with worsening  shortness of breath which has been going on for w 1-2 weeks and has  gotten worse patient was taken to the ED via EMS and upon arrival was in severe respiratory distress saturating 88%.  He did receive steroids and DuoNebs in the ED and initiated on BiPAP 16/8 as well as given 10 mg of morphine and subsequently transferred to ICU for further management.      In ICU transition to oxygen by nasal cannulae sats stayed at 97%.        Patient also tested positive for influenza A and was started on Tamiflu.     PAST MEDICAL HISTORY:  Past Medical History:   Diagnosis Date    Bladder outlet obstruction 03/05/2017    Carpal tunnel syndrome of left wrist 04/02/2013    Cellulitis of right lower extremity 03/27/2023    Cellulitis of right upper extremity 03/01/2023    w/ Group A Strep bacteremia    COPD exacerbation (HCC) 03/02/2023    Cough syncope 01/10/2023    Diabetic ketoacidosis without coma associated with type 2 diabetes mellitus (HCC) 02/18/2018    GI bleed 05/08/2022    Gout 02/01/2013    Hilar adenopathy     Iron deficiency anemia     Malignant neoplasm of urinary bladder (HCC) 02/09/2016    Multiple duodenal ulcers     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 01/24/2020    Ulnar nerve entrapment  Negative for syncope  Hematological: Negative for adenopathy  Psychiatric/Behavorial: Negative for anxiety    PHYSICAL EXAM:  Vitals:    01/31/25 0900   BP: (!) 143/76   Pulse: (!) 105   Resp: 21   Temp:    SpO2: 99%     Gen: No distress.   Eyes: PERRL. No sclera icterus. No conjunctival injection.   ENT: No discharge. Pharynx clear.   Neck: Trachea midline. No obvious mass.    Resp:Rhonchi   CV: Regular rate. Regular rhythm. No murmur or rub. No edema. Peripheral pulses are 2+.  Capillary refill is less than 3 seconds.  GI: Non-tender. Non-distended. No hernia.   Skin: Warm and dry. No nodule on exposed extremities.   Lymph: No cervical LAD. No supraclavicular LAD.   M/S: No cyanosis. No joint deformity. No clubbing.   Neuro: Awake. Alert. Moves all four extremities.   Psych: Oriented x 3. No anxiety.     LABS:  CBC:   Recent Labs     01/30/25  1735 01/31/25  0452   WBC 6.2 4.1   HGB 15.0 14.6   HCT 45.4 43.4   MCV 93.7 94.4    131*     BMP:   Recent Labs     01/30/25  1735 01/31/25  0452    136   K 4.0 4.0   CL 93* 95*   CO2 33* 29   BUN 13 19   CREATININE 0.9 0.9     LIVER PROFILE:   Recent Labs     01/30/25  1735   AST 38*   ALT 14   BILITOT 0.4   ALKPHOS 158*     Flu +     Imaging:  Chest imaging was reviewed by me and showed No acute Process    ASSESSMENT:   Acute Hypoxic Hypercapnic resp failure  Acute COPD exacerbation  Flu A +   Smoking       PLAN:    *- O2 goal 92-95   *-IV steroids ,wean To PO when feel better ,and taper over 7 days   *-IV abx Azithromycin Day 1   *-check viral panel /pneumonia panel  Flu A +  *- BiPAP 12/8 at night   *- procalcitonin  *-BD  ICS if wheezing   Incentive spirmetery and flutter valve   Avoid fluid over load   CT chest :if not better and to follow screen yearly which should be soon   Check o2 at ambulation before discharge and if sat less than 88 ,call for O2 at home   May needed NIMV down the road ,will assess as OP

## 2025-01-31 NOTE — ED NOTES
[place] [facility] [place]  All questions and concerns addressed.  Pt in stable condition.  Report to Della MEEKS here at [facility] [place] [place].  Transfer of care at this time.  Round trip request placed.  Della will update Charissa with ETA when ride has been accepted.  Roz Holloway, RN

## 2025-01-31 NOTE — PROGRESS NOTES
Pt has been unable to void this shift bladder scanned showed 611 ml. Perfect serve sent to DR Richey

## 2025-01-31 NOTE — PROGRESS NOTES
RT Inhaler-Nebulizer Bronchodilator Protocol Note    There is a bronchodilator order in the chart from a provider indicating to follow the RT Bronchodilator Protocol and there is an “Initiate RT Inhaler-Nebulizer Bronchodilator Protocol” order as well (see protocol at bottom of note).    CXR Findings:  XR CHEST PORTABLE    Result Date: 1/30/2025  No acute process.       The findings from the last RT Protocol Assessment were as follows:   History Pulmonary Disease: Chronic pulmonary disease  Respiratory Pattern: Mild dyspnea at rest, irregular pattern, or RR 21-25 bpm  Breath Sounds: Intermittent or unilateral wheezes  Cough: Strong, spontaneous, non-productive  Indication for Bronchodilator Therapy: Wheezing associated with pulm disorder  Bronchodilator Assessment Score: 10    Aerosolized bronchodilator medication orders have been revised according to the RT Inhaler-Nebulizer Bronchodilator Protocol below.    Respiratory Therapist to perform RT Therapy Protocol Assessment initially then follow the protocol.  Repeat RT Therapy Protocol Assessment PRN for score 0-3 or on second treatment, BID, and PRN for scores above 3.    No Indications - adjust the frequency to every 6 hours PRN wheezing or bronchospasm, if no treatments needed after 48 hours then discontinue using Per Protocol order mode.     If indication present, adjust the RT bronchodilator orders based on the Bronchodilator Assessment Score as indicated below.  Use Inhaler orders unless patient has one or more of the following: on home nebulizer, not able to hold breath for 10 seconds, is not alert and oriented, cannot activate and use MDI correctly, or respiratory rate 25 breaths per minute or more, then use the equivalent nebulizer order(s) with same Frequency and PRN reasons based on the score.  If a patient is on this medication at home then do not decrease Frequency below that used at home.    0-3 - enter or revise RT bronchodilator order(s) to equivalent

## 2025-02-01 PROBLEM — F10.90 ALCOHOL USE DISORDER: Status: ACTIVE | Noted: 2025-02-01

## 2025-02-01 PROBLEM — E83.42 HYPOMAGNESEMIA: Status: ACTIVE | Noted: 2025-02-01

## 2025-02-01 LAB
ANION GAP SERPL CALCULATED.3IONS-SCNC: 11 MMOL/L (ref 3–16)
BASOPHILS # BLD: 0.1 K/UL (ref 0–0.2)
BASOPHILS NFR BLD: 1.1 %
BUN SERPL-MCNC: 39 MG/DL (ref 7–20)
CALCIUM SERPL-MCNC: 9.7 MG/DL (ref 8.3–10.6)
CHLORIDE SERPL-SCNC: 94 MMOL/L (ref 99–110)
CO2 SERPL-SCNC: 29 MMOL/L (ref 21–32)
CREAT SERPL-MCNC: 1.2 MG/DL (ref 0.8–1.3)
DEPRECATED RDW RBC AUTO: 15.2 % (ref 12.4–15.4)
EOSINOPHIL # BLD: 0 K/UL (ref 0–0.6)
EOSINOPHIL NFR BLD: 0 %
GFR SERPLBLD CREATININE-BSD FMLA CKD-EPI: 66 ML/MIN/{1.73_M2}
GLUCOSE BLD-MCNC: 115 MG/DL (ref 70–99)
GLUCOSE BLD-MCNC: 133 MG/DL (ref 70–99)
GLUCOSE BLD-MCNC: 139 MG/DL (ref 70–99)
GLUCOSE BLD-MCNC: 166 MG/DL (ref 70–99)
GLUCOSE BLD-MCNC: 206 MG/DL (ref 70–99)
GLUCOSE SERPL-MCNC: 135 MG/DL (ref 70–99)
HCT VFR BLD AUTO: 43.7 % (ref 40.5–52.5)
HGB BLD-MCNC: 14.8 G/DL (ref 13.5–17.5)
LEGIONELLA AG UR QL: NORMAL
LYMPHOCYTES # BLD: 0.5 K/UL (ref 1–5.1)
LYMPHOCYTES NFR BLD: 4.6 %
MCH RBC QN AUTO: 31.6 PG (ref 26–34)
MCHC RBC AUTO-ENTMCNC: 33.9 G/DL (ref 31–36)
MCV RBC AUTO: 93.3 FL (ref 80–100)
MONOCYTES # BLD: 0.3 K/UL (ref 0–1.3)
MONOCYTES NFR BLD: 3.1 %
NEUTROPHILS # BLD: 9.8 K/UL (ref 1.7–7.7)
NEUTROPHILS NFR BLD: 91.2 %
PERFORMED ON: ABNORMAL
PLATELET # BLD AUTO: 173 K/UL (ref 135–450)
PMV BLD AUTO: 8.7 FL (ref 5–10.5)
POTASSIUM SERPL-SCNC: 4 MMOL/L (ref 3.5–5.1)
RBC # BLD AUTO: 4.68 M/UL (ref 4.2–5.9)
S PNEUM AG UR QL: NORMAL
SODIUM SERPL-SCNC: 134 MMOL/L (ref 136–145)
WBC # BLD AUTO: 10.7 K/UL (ref 4–11)

## 2025-02-01 PROCEDURE — 80048 BASIC METABOLIC PNL TOTAL CA: CPT

## 2025-02-01 PROCEDURE — 6370000000 HC RX 637 (ALT 250 FOR IP): Performed by: INTERNAL MEDICINE

## 2025-02-01 PROCEDURE — 94761 N-INVAS EAR/PLS OXIMETRY MLT: CPT

## 2025-02-01 PROCEDURE — 6370000000 HC RX 637 (ALT 250 FOR IP)

## 2025-02-01 PROCEDURE — 94640 AIRWAY INHALATION TREATMENT: CPT

## 2025-02-01 PROCEDURE — 36415 COLL VENOUS BLD VENIPUNCTURE: CPT

## 2025-02-01 PROCEDURE — 99233 SBSQ HOSP IP/OBS HIGH 50: CPT | Performed by: INTERNAL MEDICINE

## 2025-02-01 PROCEDURE — 6360000002 HC RX W HCPCS: Performed by: INTERNAL MEDICINE

## 2025-02-01 PROCEDURE — 2700000000 HC OXYGEN THERAPY PER DAY

## 2025-02-01 PROCEDURE — 85025 COMPLETE CBC W/AUTO DIFF WBC: CPT

## 2025-02-01 PROCEDURE — 6360000002 HC RX W HCPCS

## 2025-02-01 PROCEDURE — 1200000000 HC SEMI PRIVATE

## 2025-02-01 PROCEDURE — 51798 US URINE CAPACITY MEASURE: CPT

## 2025-02-01 PROCEDURE — 2500000003 HC RX 250 WO HCPCS

## 2025-02-01 RX ORDER — OSELTAMIVIR PHOSPHATE 6 MG/ML
75 FOR SUSPENSION ORAL 2 TIMES DAILY
Status: COMPLETED | OUTPATIENT
Start: 2025-02-01 | End: 2025-02-04

## 2025-02-01 RX ORDER — DOCUSATE SODIUM 100 MG/1
100 CAPSULE, LIQUID FILLED ORAL NIGHTLY
Status: DISCONTINUED | OUTPATIENT
Start: 2025-02-01 | End: 2025-02-01

## 2025-02-01 RX ORDER — ALBUTEROL SULFATE 0.83 MG/ML
2.5 SOLUTION RESPIRATORY (INHALATION)
Status: DISCONTINUED | OUTPATIENT
Start: 2025-02-01 | End: 2025-02-03

## 2025-02-01 RX ORDER — SENNA AND DOCUSATE SODIUM 50; 8.6 MG/1; MG/1
2 TABLET, FILM COATED ORAL DAILY PRN
Status: DISCONTINUED | OUTPATIENT
Start: 2025-02-01 | End: 2025-02-07

## 2025-02-01 RX ADMIN — AZITHROMYCIN DIHYDRATE 500 MG: 250 TABLET ORAL at 08:13

## 2025-02-01 RX ADMIN — POLYETHYLENE GLYCOL (3350) 17 G: 17 POWDER, FOR SOLUTION ORAL at 08:15

## 2025-02-01 RX ADMIN — ALBUTEROL SULFATE 2.5 MG: 2.5 SOLUTION RESPIRATORY (INHALATION) at 19:49

## 2025-02-01 RX ADMIN — GABAPENTIN 800 MG: 400 CAPSULE ORAL at 12:34

## 2025-02-01 RX ADMIN — GABAPENTIN 800 MG: 400 CAPSULE ORAL at 08:23

## 2025-02-01 RX ADMIN — FOLIC ACID 1000 MCG: 1 TABLET ORAL at 08:14

## 2025-02-01 RX ADMIN — Medication 75 MG: at 21:41

## 2025-02-01 RX ADMIN — FUROSEMIDE 20 MG: 20 TABLET ORAL at 08:14

## 2025-02-01 RX ADMIN — INSULIN GLARGINE 15 UNITS: 100 INJECTION, SOLUTION SUBCUTANEOUS at 21:42

## 2025-02-01 RX ADMIN — DULOXETINE 60 MG: 60 CAPSULE, DELAYED RELEASE ORAL at 08:14

## 2025-02-01 RX ADMIN — GABAPENTIN 800 MG: 400 CAPSULE ORAL at 17:01

## 2025-02-01 RX ADMIN — SODIUM CHLORIDE, PRESERVATIVE FREE 10 ML: 5 INJECTION INTRAVENOUS at 08:24

## 2025-02-01 RX ADMIN — FERROUS SULFATE TAB 325 MG (65 MG ELEMENTAL FE) 325 MG: 325 (65 FE) TAB at 08:14

## 2025-02-01 RX ADMIN — OSELTAMIVIR PHOSPHATE 75 MG: 75 CAPSULE ORAL at 08:13

## 2025-02-01 RX ADMIN — ATORVASTATIN CALCIUM 40 MG: 40 TABLET, FILM COATED ORAL at 08:13

## 2025-02-01 RX ADMIN — WATER 40 MG: 1 INJECTION INTRAMUSCULAR; INTRAVENOUS; SUBCUTANEOUS at 08:15

## 2025-02-01 RX ADMIN — DILTIAZEM HYDROCHLORIDE 180 MG: 180 CAPSULE, COATED, EXTENDED RELEASE ORAL at 08:14

## 2025-02-01 RX ADMIN — BUDESONIDE 500 MCG: 0.5 INHALANT RESPIRATORY (INHALATION) at 19:50

## 2025-02-01 RX ADMIN — ALLOPURINOL 300 MG: 300 TABLET ORAL at 08:13

## 2025-02-01 RX ADMIN — INSULIN LISPRO 1 UNITS: 100 INJECTION, SOLUTION INTRAVENOUS; SUBCUTANEOUS at 11:27

## 2025-02-01 RX ADMIN — ALBUTEROL SULFATE 2.5 MG: 2.5 SOLUTION RESPIRATORY (INHALATION) at 15:57

## 2025-02-01 RX ADMIN — ALBUTEROL SULFATE 2.5 MG: 2.5 SOLUTION RESPIRATORY (INHALATION) at 11:23

## 2025-02-01 RX ADMIN — BUDESONIDE 500 MCG: 0.5 INHALANT RESPIRATORY (INHALATION) at 08:36

## 2025-02-01 RX ADMIN — BUDESONIDE AND FORMOTEROL FUMARATE DIHYDRATE 2 PUFF: 160; 4.5 AEROSOL RESPIRATORY (INHALATION) at 08:36

## 2025-02-01 RX ADMIN — SODIUM CHLORIDE, PRESERVATIVE FREE 10 ML: 5 INJECTION INTRAVENOUS at 21:42

## 2025-02-01 RX ADMIN — Medication 5000 UNITS: at 08:14

## 2025-02-01 RX ADMIN — BENZONATATE 100 MG: 100 CAPSULE ORAL at 04:29

## 2025-02-01 RX ADMIN — WATER 40 MG: 1 INJECTION INTRAMUSCULAR; INTRAVENOUS; SUBCUTANEOUS at 04:07

## 2025-02-01 RX ADMIN — GABAPENTIN 800 MG: 400 CAPSULE ORAL at 21:41

## 2025-02-01 RX ADMIN — TIOTROPIUM BROMIDE INHALATION SPRAY 2 PUFF: 3.12 SPRAY, METERED RESPIRATORY (INHALATION) at 08:37

## 2025-02-01 RX ADMIN — GUAIFENESIN 600 MG: 600 TABLET, EXTENDED RELEASE ORAL at 21:41

## 2025-02-01 RX ADMIN — GUAIFENESIN 600 MG: 600 TABLET, EXTENDED RELEASE ORAL at 08:14

## 2025-02-01 RX ADMIN — ENOXAPARIN SODIUM 40 MG: 100 INJECTION SUBCUTANEOUS at 08:15

## 2025-02-01 RX ADMIN — ALBUTEROL SULFATE 2.5 MG: 2.5 SOLUTION RESPIRATORY (INHALATION) at 08:36

## 2025-02-01 RX ADMIN — METOPROLOL SUCCINATE 25 MG: 25 TABLET, EXTENDED RELEASE ORAL at 08:14

## 2025-02-01 RX ADMIN — WATER 40 MG: 1 INJECTION INTRAMUSCULAR; INTRAVENOUS; SUBCUTANEOUS at 17:01

## 2025-02-01 RX ADMIN — BUDESONIDE AND FORMOTEROL FUMARATE DIHYDRATE 2 PUFF: 160; 4.5 AEROSOL RESPIRATORY (INHALATION) at 19:50

## 2025-02-01 ASSESSMENT — PAIN SCALES - GENERAL: PAINLEVEL_OUTOF10: 4

## 2025-02-01 ASSESSMENT — PAIN DESCRIPTION - LOCATION: LOCATION: ABDOMEN

## 2025-02-01 NOTE — PROGRESS NOTES
Patient provided a COPD Educational Folder that includes the following materials:     [x]  Dogi Booklet: Managing your COPD  [x]  ALA: Getting the Most Out of Medication Delivery Devices  [x]  ALA: My COPD Action Plan  [x]  Better Breathers Club: [facility]   [x]  Smoking Cessation Classes  [x]  Outpatient Spiritual Care Services  [x]  Magnet: Signs of COPD    PATIENT/CAREGIVER TEACHING:   Level of patient/caregiver understanding able to:   [x] Verbalize understanding   [] Demonstrate understanding       [] Teach back        [] Needs reinforcement     []  Other:     Electronically signed by Maile Diego RN on 2/1/2025 at 5:36 AM

## 2025-02-01 NOTE — PROGRESS NOTES
Patient's sister, Blaire called and stated that patient was confused. This RN re-assessed patient's orientation and he is alert and oriented x4, he was unsure of how many days he has been here. Also the patient gave me permission to discuss his plan of care.     Willow Gillette RN

## 2025-02-01 NOTE — PROGRESS NOTES
Admit: 2025    Name:  Sandor Early  Room:  [facility]3013-  MRN:    [id number]    Critical Care Daily Progress Note for 2025   Admitted with influenza A,acute copd exacerbation  Placed on BiPAP  Interval History:   :  Used BiPAP at night   Now on 3 L of O2   Feels better     : He is doing better.  Still has some chest tightness.  Much improved.  Has a cough.  No fever or chills.  On 4L.    Scheduled Meds:   albuterol  2.5 mg Nebulization 4x Daily RT    oseltamivir  75 mg Oral BID    LORazepam  2 mg Oral Once    sodium chloride flush  5-40 mL IntraVENous 2 times per day    enoxaparin  40 mg SubCUTAneous Daily    guaiFENesin  600 mg Oral BID    methylPREDNISolone  40 mg IntraVENous Q6H    azithromycin  500 mg Oral Daily    insulin lispro  0-4 Units SubCUTAneous 4x Daily AC & HS    nicotine  1 patch TransDERmal Daily    budesonide  0.5 mg Nebulization BID RT    gabapentin  800 mg Oral 4x Daily    dilTIAZem  180 mg Oral Daily    allopurinol  300 mg Oral Daily    vitamin D3  5,000 Units Oral Daily    furosemide  20 mg Oral Daily    folic acid  1,000 mcg Oral Daily    ferrous sulfate  325 mg Oral BID WC    insulin glargine  15 Units SubCUTAneous Nightly    DULoxetine  60 mg Oral Daily    atorvastatin  40 mg Oral Daily    metoprolol succinate  25 mg Oral Daily    budesonide-formoterol  2 puff Inhalation BID RT    And    tiotropium  2 puff Inhalation Daily RT       Continuous Infusions:   sodium chloride      dextrose         PRN Meds:  sennosides-docusate sodium, sodium chloride flush, sodium chloride, ondansetron **OR** ondansetron, acetaminophen **OR** acetaminophen, benzonatate, albuterol, dextrose bolus **OR** dextrose bolus, glucagon (rDNA), dextrose, potassium chloride **OR** potassium chloride, magnesium sulfate, polyethylene glycol, calcium carbonate, oxyCODONE HCl, glucose           Objective:     Temp  Av.7 °F (36.5 °C)  Min: 97.5 °F (36.4 °C)  Max: 97.8 °F (36.6 °C)  Pulse  Av.9  Min: 61  failure with hypoxia and hypercapnia    Diabetes mellitus (HCC)    Acute respiratory failure    Influenza A  Resolved Problems:    * No resolved hospital problems. *       Acute on chronic hypoxic and hypercarbic resp failure   2/2 to Influenza A and COPD exacerbation  Placed on BiPAP and admitted to the ICU  Used BiPAP for several hours last night   Currently on 4 L of oxygen    Acute COPD exacerbation  Continue steroids, inhaled bronchodilators  Continue azithromycin  Sputum cultures pending.    Influenza A  Continue Tamiflu  Mucinex, tessalon for cough  Droplet isolation    Type 2 diabetes with neuropathy.  Continue Lantus insulin sliding scale insulin  Stable.  -continue gabapentin    Hypertension  Continue metoprolol, cardizem and lasix     Hyperlipidemia.   -continue statin.     Peripheral vascular disease  Status post aortoiliac bypass graft  Continue statins  Unclear why he is not on any antiplatelets    Chronic diastolic HF   -no s/s of acute decompensation   -continue lasix     Chronic pain.  -cymbalta, PRN oxycodone.     Gout.  -continue allopurinol.      Hx cirrhosis.  -2/2 [place], alcohol.    Full code  Carb controlled diet  Lovenox for DVT prophylaxis      Can transfer out of ICU.  Discussed with RN.      SVETLANA MOONEY MD 2/1/2025 3:57 PM

## 2025-02-01 NOTE — FLOWSHEET NOTE
02/01/25 0809   Vital Signs   Temp 97.8 °F (36.6 °C)   Temp Source Oral   Pulse 75   Heart Rate Source Monitor   Respirations 18   /64   MAP (Calculated) 83   BP Location Right upper arm   BP Method Automatic   Patient Position Sitting   Oxygen Therapy   SpO2 94 %   O2 Device Nasal cannula   O2 Flow Rate (L/min) 4 L/min     Vitals and AM assessment completed. No s/s of distress. Patient sitting up in bed, eating breakfast. Very pleasant. Medications given per MAR without complication. Patient requested laxative, PRN dose given. Patient remains on 4L O2. Blood sugar good, no coverage needed. No further needs at this time.     Willow Gillette RN

## 2025-02-01 NOTE — PROGRESS NOTES
RT Inhaler-Nebulizer Bronchodilator Protocol Note    There is a bronchodilator order in the chart from a provider indicating to follow the RT Bronchodilator Protocol and there is an “Initiate RT Inhaler-Nebulizer Bronchodilator Protocol” order as well (see protocol at bottom of note).    CXR Findings:  XR CHEST PORTABLE    Result Date: 1/30/2025  No acute process.       The findings from the last RT Protocol Assessment were as follows:   History Pulmonary Disease: (P) Chronic pulmonary disease  Respiratory Pattern: (P) Dyspnea on exertion or RR 21-25 bpm  Breath Sounds: (P) Inspiratory and expiratory or bilateral wheezing and/or rhonchi  Cough: (P) Strong, productive  Indication for Bronchodilator Therapy: (P) Wheezing associated with pulm disorder  Bronchodilator Assessment Score: (P) 11    Aerosolized bronchodilator medication orders have been revised according to the RT Inhaler-Nebulizer Bronchodilator Protocol below.    Respiratory Therapist to perform RT Therapy Protocol Assessment initially then follow the protocol.  Repeat RT Therapy Protocol Assessment PRN for score 0-3 or on second treatment, BID, and PRN for scores above 3.    No Indications - adjust the frequency to every 6 hours PRN wheezing or bronchospasm, if no treatments needed after 48 hours then discontinue using Per Protocol order mode.     If indication present, adjust the RT bronchodilator orders based on the Bronchodilator Assessment Score as indicated below.  Use Inhaler orders unless patient has one or more of the following: on home nebulizer, not able to hold breath for 10 seconds, is not alert and oriented, cannot activate and use MDI correctly, or respiratory rate 25 breaths per minute or more, then use the equivalent nebulizer order(s) with same Frequency and PRN reasons based on the score.  If a patient is on this medication at home then do not decrease Frequency below that used at home.    0-3 - enter or revise RT bronchodilator

## 2025-02-01 NOTE — PLAN OF CARE
Problem: Chronic Conditions and Co-morbidities  Goal: Patient's chronic conditions and co-morbidity symptoms are monitored and maintained or improved  2/1/2025 0833 by Willow Gillette RN  Outcome: Progressing  Flowsheets (Taken 2/1/2025 0829)  Care Plan - Patient's Chronic Conditions and Co-Morbidity Symptoms are Monitored and Maintained or Improved: Monitor and assess patient's chronic conditions and comorbid symptoms for stability, deterioration, or improvement  1/31/2025 2038 by Maile Diego RN  Outcome: Progressing     Problem: Discharge Planning  Goal: Discharge to home or other facility with appropriate resources  2/1/2025 0833 by Willow Gillette RN  Outcome: Progressing  Flowsheets (Taken 2/1/2025 0829)  Discharge to home or other facility with appropriate resources:   Identify barriers to discharge with patient and caregiver   Arrange for needed discharge resources and transportation as appropriate   Identify discharge learning needs (meds, wound care, etc)  1/31/2025 2038 by Maile Diego RN  Outcome: Progressing     Problem: Safety - Adult  Goal: Free from fall injury  2/1/2025 0833 by Willow Gillette RN  Outcome: Progressing  1/31/2025 2038 by Maile Diego RN  Outcome: Progressing     Problem: Pain  Goal: Verbalizes/displays adequate comfort level or baseline comfort level  2/1/2025 0833 by Willow Gillette RN  Outcome: Progressing  1/31/2025 2038 by Maile Diego RN  Outcome: Progressing

## 2025-02-01 NOTE — PROGRESS NOTES
1 unit insulin given. Patient to be transferred out of ICU per pulmonology.    Willow Gillette RN

## 2025-02-01 NOTE — PROGRESS NOTES
[facility] Pulmonary, Critical Care and Sleep Specialists                                 Pulmonary/Critical care  Consult /Progress Note :                                                                  CC :worsening SOB   Patient is being seen at the request of Dr Richey  for a consultation for Acute hypoxic ,hypercapnic resp failure  Subjective   Doing much better  On 6 L  Base 2-4   Some congestion     PHYSICAL EXAM:  Vitals:    02/01/25 1124   BP:    Pulse:    Resp: 19   Temp:    SpO2:      Gen: No distress.   Eyes: PERRL. No sclera icterus. No conjunctival injection.   ENT: No discharge. Pharynx clear.   Neck: Trachea midline. No obvious mass.    Resp:Rhonchi   CV: Regular rate. Regular rhythm. No murmur or rub. No edema. Peripheral pulses are 2+.  Capillary refill is less than 3 seconds.  GI: Non-tender. Non-distended. No hernia.   Skin: Warm and dry. No nodule on exposed extremities.   Lymph: No cervical LAD. No supraclavicular LAD.   M/S: No cyanosis. No joint deformity. No clubbing.   Neuro: Awake. Alert. Moves all four extremities.   Psych: Oriented x 3. No anxiety.     LABS:  CBC:   Recent Labs     01/30/25  1735 01/31/25  0452 02/01/25  0446   WBC 6.2 4.1 10.7   HGB 15.0 14.6 14.8   HCT 45.4 43.4 43.7   MCV 93.7 94.4 93.3    131* 173     BMP:   Recent Labs     01/30/25  1735 01/31/25  0452 02/01/25  0446    136 134*   K 4.0 4.0 4.0   CL 93* 95* 94*   CO2 33* 29 29   BUN 13 19 39*   CREATININE 0.9 0.9 1.2     LIVER PROFILE:   Recent Labs     01/30/25  1735   AST 38*   ALT 14   BILITOT 0.4   ALKPHOS 158*     Flu +     Imaging:  Chest imaging was reviewed by me and showed No acute Process    ASSESSMENT:   Acute Hypoxic Hypercapnic resp failure  Acute COPD exacerbation  Flu A +   Smoking       PLAN:    *- O2 goal 92-95   *-IV steroids ,wean To PO when feel better ,and taper over 7 days   *-IV abx Azithromycin Day 1   *-check viral panel /pneumonia panel

## 2025-02-01 NOTE — PROGRESS NOTES
01/31/25 2000   RT Protocol   History Pulmonary Disease 2   Respiratory pattern 2   Breath sounds 2   Cough 0   Indications for Bronchodilator Therapy Decreased or absent breath sounds   Bronchodilator Assessment Score 6     RT Inhaler-Nebulizer Bronchodilator Protocol Note    There is a bronchodilator order in the chart from a provider indicating to follow the RT Bronchodilator Protocol and there is an “Initiate RT Inhaler-Nebulizer Bronchodilator Protocol” order as well (see protocol at bottom of note).    CXR Findings:  XR CHEST PORTABLE    Result Date: 1/30/2025  No acute process.       The findings from the last RT Protocol Assessment were as follows:   History Pulmonary Disease: Chronic pulmonary disease  Respiratory Pattern: Dyspnea on exertion or RR 21-25 bpm  Breath Sounds: Slightly diminished and/or crackles  Cough: Strong, spontaneous, non-productive  Indication for Bronchodilator Therapy: Decreased or absent breath sounds  Bronchodilator Assessment Score: 6    Aerosolized bronchodilator medication orders have been revised according to the RT Inhaler-Nebulizer Bronchodilator Protocol below.    Respiratory Therapist to perform RT Therapy Protocol Assessment initially then follow the protocol.  Repeat RT Therapy Protocol Assessment PRN for score 0-3 or on second treatment, BID, and PRN for scores above 3.    No Indications - adjust the frequency to every 6 hours PRN wheezing or bronchospasm, if no treatments needed after 48 hours then discontinue using Per Protocol order mode.     If indication present, adjust the RT bronchodilator orders based on the Bronchodilator Assessment Score as indicated below.  Use Inhaler orders unless patient has one or more of the following: on home nebulizer, not able to hold breath for 10 seconds, is not alert and oriented, cannot activate and use MDI correctly, or respiratory rate 25 breaths per minute or more, then use the equivalent nebulizer order(s) with same Frequency  and PRN reasons based on the score.  If a patient is on this medication at home then do not decrease Frequency below that used at home.    0-3 - enter or revise RT bronchodilator order(s) to equivalent RT Bronchodilator order with Frequency of every 4 hours PRN for wheezing or increased work of breathing using Per Protocol order mode.        4-6 - enter or revise RT Bronchodilator order(s) to two equivalent RT bronchodilator orders with one order with BID Frequency and one order with Frequency of every 4 hours PRN wheezing or increased work of breathing using Per Protocol order mode.        7-10 - enter or revise RT Bronchodilator order(s) to two equivalent RT bronchodilator orders with one order with TID Frequency and one order with Frequency of every 4 hours PRN wheezing or increased work of breathing using Per Protocol order mode.       11-13 - enter or revise RT Bronchodilator order(s) to one equivalent RT bronchodilator order with QID Frequency and an Albuterol order with Frequency of every 4 hours PRN wheezing or increased work of breathing using Per Protocol order mode.      Greater than 13 - enter or revise RT Bronchodilator order(s) to one equivalent RT bronchodilator order with every 4 hours Frequency and an Albuterol order with Frequency of every 2 hours PRN wheezing or increased work of breathing using Per Protocol order mode.       Electronically signed by Emory Jurado RCP on 1/31/2025 at 8:08 PM

## 2025-02-02 ENCOUNTER — APPOINTMENT (OUTPATIENT)
Dept: GENERAL RADIOLOGY | Age: 70
DRG: 207 | End: 2025-02-02
Payer: MEDICARE

## 2025-02-02 LAB
ANION GAP SERPL CALCULATED.3IONS-SCNC: 11 MMOL/L (ref 3–16)
BASOPHILS # BLD: 0 K/UL (ref 0–0.2)
BASOPHILS NFR BLD: 0.1 %
BUN SERPL-MCNC: 39 MG/DL (ref 7–20)
CALCIUM SERPL-MCNC: 9.7 MG/DL (ref 8.3–10.6)
CHLORIDE SERPL-SCNC: 91 MMOL/L (ref 99–110)
CO2 SERPL-SCNC: 32 MMOL/L (ref 21–32)
CREAT SERPL-MCNC: 1 MG/DL (ref 0.8–1.3)
DEPRECATED RDW RBC AUTO: 15.3 % (ref 12.4–15.4)
EOSINOPHIL # BLD: 0 K/UL (ref 0–0.6)
EOSINOPHIL NFR BLD: 0 %
GFR SERPLBLD CREATININE-BSD FMLA CKD-EPI: 82 ML/MIN/{1.73_M2}
GLUCOSE BLD-MCNC: 166 MG/DL (ref 70–99)
GLUCOSE BLD-MCNC: 167 MG/DL (ref 70–99)
GLUCOSE BLD-MCNC: 170 MG/DL (ref 70–99)
GLUCOSE BLD-MCNC: 179 MG/DL (ref 70–99)
GLUCOSE SERPL-MCNC: 162 MG/DL (ref 70–99)
HCT VFR BLD AUTO: 43.9 % (ref 40.5–52.5)
HGB BLD-MCNC: 14.8 G/DL (ref 13.5–17.5)
LYMPHOCYTES # BLD: 0.4 K/UL (ref 1–5.1)
LYMPHOCYTES NFR BLD: 2.6 %
MCH RBC QN AUTO: 30.9 PG (ref 26–34)
MCHC RBC AUTO-ENTMCNC: 33.6 G/DL (ref 31–36)
MCV RBC AUTO: 92 FL (ref 80–100)
MONOCYTES # BLD: 0.6 K/UL (ref 0–1.3)
MONOCYTES NFR BLD: 4.7 %
NEUTROPHILS # BLD: 12.5 K/UL (ref 1.7–7.7)
NEUTROPHILS NFR BLD: 92.6 %
PERFORMED ON: ABNORMAL
PLATELET # BLD AUTO: 176 K/UL (ref 135–450)
PMV BLD AUTO: 8.5 FL (ref 5–10.5)
POTASSIUM SERPL-SCNC: 4.3 MMOL/L (ref 3.5–5.1)
RBC # BLD AUTO: 4.77 M/UL (ref 4.2–5.9)
SODIUM SERPL-SCNC: 134 MMOL/L (ref 136–145)
WBC # BLD AUTO: 13.5 K/UL (ref 4–11)

## 2025-02-02 PROCEDURE — 99233 SBSQ HOSP IP/OBS HIGH 50: CPT | Performed by: INTERNAL MEDICINE

## 2025-02-02 PROCEDURE — 1200000000 HC SEMI PRIVATE

## 2025-02-02 PROCEDURE — 6360000002 HC RX W HCPCS: Performed by: INTERNAL MEDICINE

## 2025-02-02 PROCEDURE — 94761 N-INVAS EAR/PLS OXIMETRY MLT: CPT

## 2025-02-02 PROCEDURE — 6370000000 HC RX 637 (ALT 250 FOR IP): Performed by: INTERNAL MEDICINE

## 2025-02-02 PROCEDURE — 6370000000 HC RX 637 (ALT 250 FOR IP)

## 2025-02-02 PROCEDURE — 2500000003 HC RX 250 WO HCPCS

## 2025-02-02 PROCEDURE — 85025 COMPLETE CBC W/AUTO DIFF WBC: CPT

## 2025-02-02 PROCEDURE — 94640 AIRWAY INHALATION TREATMENT: CPT

## 2025-02-02 PROCEDURE — 36415 COLL VENOUS BLD VENIPUNCTURE: CPT

## 2025-02-02 PROCEDURE — 74019 RADEX ABDOMEN 2 VIEWS: CPT

## 2025-02-02 PROCEDURE — 2700000000 HC OXYGEN THERAPY PER DAY

## 2025-02-02 PROCEDURE — 6360000002 HC RX W HCPCS

## 2025-02-02 PROCEDURE — 80048 BASIC METABOLIC PNL TOTAL CA: CPT

## 2025-02-02 RX ADMIN — GABAPENTIN 800 MG: 400 CAPSULE ORAL at 08:39

## 2025-02-02 RX ADMIN — ATORVASTATIN CALCIUM 40 MG: 40 TABLET, FILM COATED ORAL at 08:39

## 2025-02-02 RX ADMIN — ALBUTEROL SULFATE 2.5 MG: 2.5 SOLUTION RESPIRATORY (INHALATION) at 19:53

## 2025-02-02 RX ADMIN — METOPROLOL SUCCINATE 25 MG: 25 TABLET, EXTENDED RELEASE ORAL at 08:39

## 2025-02-02 RX ADMIN — TIOTROPIUM BROMIDE INHALATION SPRAY 2 PUFF: 3.12 SPRAY, METERED RESPIRATORY (INHALATION) at 08:47

## 2025-02-02 RX ADMIN — SODIUM CHLORIDE, PRESERVATIVE FREE 10 ML: 5 INJECTION INTRAVENOUS at 22:04

## 2025-02-02 RX ADMIN — MINERAL OIL 330 ML: 999 LIQUID ORAL at 17:10

## 2025-02-02 RX ADMIN — PREDNISONE 30 MG: 20 TABLET ORAL at 12:17

## 2025-02-02 RX ADMIN — ALBUTEROL SULFATE 2.5 MG: 2.5 SOLUTION RESPIRATORY (INHALATION) at 08:45

## 2025-02-02 RX ADMIN — GUAIFENESIN 600 MG: 600 TABLET, EXTENDED RELEASE ORAL at 08:39

## 2025-02-02 RX ADMIN — ALBUTEROL SULFATE 2.5 MG: 2.5 SOLUTION RESPIRATORY (INHALATION) at 15:35

## 2025-02-02 RX ADMIN — FERROUS SULFATE TAB 325 MG (65 MG ELEMENTAL FE) 325 MG: 325 (65 FE) TAB at 08:39

## 2025-02-02 RX ADMIN — INSULIN GLARGINE 15 UNITS: 100 INJECTION, SOLUTION SUBCUTANEOUS at 22:03

## 2025-02-02 RX ADMIN — GABAPENTIN 800 MG: 400 CAPSULE ORAL at 17:10

## 2025-02-02 RX ADMIN — ONDANSETRON 4 MG: 2 INJECTION INTRAMUSCULAR; INTRAVENOUS at 12:17

## 2025-02-02 RX ADMIN — Medication 75 MG: at 08:40

## 2025-02-02 RX ADMIN — DILTIAZEM HYDROCHLORIDE 180 MG: 180 CAPSULE, COATED, EXTENDED RELEASE ORAL at 08:39

## 2025-02-02 RX ADMIN — BUDESONIDE AND FORMOTEROL FUMARATE DIHYDRATE 2 PUFF: 160; 4.5 AEROSOL RESPIRATORY (INHALATION) at 08:47

## 2025-02-02 RX ADMIN — BUDESONIDE AND FORMOTEROL FUMARATE DIHYDRATE 2 PUFF: 160; 4.5 AEROSOL RESPIRATORY (INHALATION) at 19:53

## 2025-02-02 RX ADMIN — FOLIC ACID 1000 MCG: 1 TABLET ORAL at 08:39

## 2025-02-02 RX ADMIN — GABAPENTIN 800 MG: 400 CAPSULE ORAL at 12:17

## 2025-02-02 RX ADMIN — Medication 75 MG: at 22:03

## 2025-02-02 RX ADMIN — Medication 5000 UNITS: at 08:39

## 2025-02-02 RX ADMIN — GABAPENTIN 800 MG: 400 CAPSULE ORAL at 22:04

## 2025-02-02 RX ADMIN — BUDESONIDE 500 MCG: 0.5 INHALANT RESPIRATORY (INHALATION) at 08:45

## 2025-02-02 RX ADMIN — ENOXAPARIN SODIUM 40 MG: 100 INJECTION SUBCUTANEOUS at 08:39

## 2025-02-02 RX ADMIN — ALLOPURINOL 300 MG: 300 TABLET ORAL at 08:39

## 2025-02-02 RX ADMIN — ALBUTEROL SULFATE 2.5 MG: 2.5 SOLUTION RESPIRATORY (INHALATION) at 11:53

## 2025-02-02 RX ADMIN — BUDESONIDE 500 MCG: 0.5 INHALANT RESPIRATORY (INHALATION) at 19:53

## 2025-02-02 RX ADMIN — AZITHROMYCIN DIHYDRATE 500 MG: 250 TABLET ORAL at 08:39

## 2025-02-02 RX ADMIN — GUAIFENESIN 600 MG: 600 TABLET, EXTENDED RELEASE ORAL at 22:04

## 2025-02-02 RX ADMIN — DULOXETINE 60 MG: 60 CAPSULE, DELAYED RELEASE ORAL at 08:39

## 2025-02-02 RX ADMIN — POLYETHYLENE GLYCOL (3350) 17 G: 17 POWDER, FOR SOLUTION ORAL at 12:22

## 2025-02-02 RX ADMIN — FUROSEMIDE 20 MG: 20 TABLET ORAL at 08:39

## 2025-02-02 ASSESSMENT — PAIN SCALES - GENERAL: PAINLEVEL_OUTOF10: 0

## 2025-02-02 NOTE — PROGRESS NOTES
[facility] Pulmonary, Critical Care and Sleep Specialists                                 Pulmonary/Critical care  Consult /Progress Note :                                                                  CC :worsening SOB   Patient is being seen at the request of Dr Richey  for a consultation for Acute hypoxic ,hypercapnic resp failure  Subjective   Doing much better  On 4 L  Base 2-4   Some congestion   Did not use BiPAP     PHYSICAL EXAM:  Vitals:    02/02/25 0839   BP: (!) 140/83   Pulse: (!) 107   Resp:    Temp:    SpO2:      Gen: No distress.   Eyes: PERRL. No sclera icterus. No conjunctival injection.   ENT: No discharge. Pharynx clear.   Neck: Trachea midline. No obvious mass.    Resp:Rhonchi   CV: Regular rate. Regular rhythm. No murmur or rub. No edema. Peripheral pulses are 2+.  Capillary refill is less than 3 seconds.  GI: Non-tender. Non-distended. No hernia.   Skin: Warm and dry. No nodule on exposed extremities.   Lymph: No cervical LAD. No supraclavicular LAD.   M/S: No cyanosis. No joint deformity. No clubbing.   Neuro: Awake. Alert. Moves all four extremities.   Psych: Oriented x 3. No anxiety.     LABS:  CBC:   Recent Labs     01/31/25  0452 02/01/25  0446 02/02/25  0443   WBC 4.1 10.7 13.5*   HGB 14.6 14.8 14.8   HCT 43.4 43.7 43.9   MCV 94.4 93.3 92.0   * 173 176     BMP:   Recent Labs     01/31/25  0452 02/01/25  0446 02/02/25  0443    134* 134*   K 4.0 4.0 4.3   CL 95* 94* 91*   CO2 29 29 32   BUN 19 39* 39*   CREATININE 0.9 1.2 1.0     LIVER PROFILE:   Recent Labs     01/30/25  1735   AST 38*   ALT 14   BILITOT 0.4   ALKPHOS 158*     Flu +     Imaging:  Chest imaging was reviewed by me and showed No acute Process    ASSESSMENT:   Acute Hypoxic Hypercapnic resp failure  Acute COPD exacerbation  Flu A +   Smoking       PLAN:    *- O2 goal 92-95   *-IV steroids ,wean To PO when feel better ,and taper over 7 days   *-IV abx Azithromycin Day

## 2025-02-02 NOTE — PLAN OF CARE
Problem: Chronic Conditions and Co-morbidities  Goal: Patient's chronic conditions and co-morbidity symptoms are monitored and maintained or improved  2/1/2025 1937 by Maile Diego RN  Outcome: Progressing  2/1/2025 0833 by Willow Gillette RN  Outcome: Progressing  Flowsheets (Taken 2/1/2025 0829)  Care Plan - Patient's Chronic Conditions and Co-Morbidity Symptoms are Monitored and Maintained or Improved: Monitor and assess patient's chronic conditions and comorbid symptoms for stability, deterioration, or improvement     Problem: Discharge Planning  Goal: Discharge to home or other facility with appropriate resources  2/1/2025 1937 by Maile Diego RN  Outcome: Progressing  2/1/2025 0833 by Willow Gillette RN  Outcome: Progressing  Flowsheets (Taken 2/1/2025 0829)  Discharge to home or other facility with appropriate resources:   Identify barriers to discharge with patient and caregiver   Arrange for needed discharge resources and transportation as appropriate   Identify discharge learning needs (meds, wound care, etc)     Problem: Safety - Adult  Goal: Free from fall injury  2/1/2025 1937 by Maile Diego RN  Outcome: Progressing  2/1/2025 0833 by Willow Gillette RN  Outcome: Progressing     Problem: Pain  Goal: Verbalizes/displays adequate comfort level or baseline comfort level  2/1/2025 1937 by Maile Diego RN  Outcome: Progressing  2/1/2025 0833 by Willow Gillette RN  Outcome: Progressing

## 2025-02-02 NOTE — PROGRESS NOTES
Patient provided a COPD Educational Folder that includes the following materials:     [x]  [employer] Booklet: Managing your COPD  [x]  ALA: Getting the Most Out of Medication Delivery Devices  [x]  ALA: My COPD Action Plan  [x]  Better Breathers [facility]: [facility]   [x]  Smoking Cessation Classes  [x]  Outpatient Spiritual Care Services  [x]  Magnet: Signs of COPD    PATIENT/CAREGIVER TEACHING:   Level of patient/caregiver understanding able to:   [x] Verbalize understanding   [] Demonstrate understanding       [] Teach back        [] Needs reinforcement     []  Other:     Electronically signed by Maile Diego RN on 2/2/2025 at 6:10 AM

## 2025-02-02 NOTE — PROGRESS NOTES
02/01/25 1900   RT Protocol   History Pulmonary Disease 2   Respiratory pattern 2   Breath sounds 2   Cough 1   Indications for Bronchodilator Therapy Decreased or absent breath sounds   Bronchodilator Assessment Score 7     RT Inhaler-Nebulizer Bronchodilator Protocol Note    There is a bronchodilator order in the chart from a provider indicating to follow the RT Bronchodilator Protocol and there is an “Initiate RT Inhaler-Nebulizer Bronchodilator Protocol” order as well (see protocol at bottom of note).    CXR Findings:  No results found.    The findings from the last RT Protocol Assessment were as follows:   History Pulmonary Disease: Chronic pulmonary disease  Respiratory Pattern: Dyspnea on exertion or RR 21-25 bpm  Breath Sounds: Slightly diminished and/or crackles  Cough: Strong, productive  Indication for Bronchodilator Therapy: Decreased or absent breath sounds  Bronchodilator Assessment Score: 7    Aerosolized bronchodilator medication orders have been revised according to the RT Inhaler-Nebulizer Bronchodilator Protocol below.    Respiratory Therapist to perform RT Therapy Protocol Assessment initially then follow the protocol.  Repeat RT Therapy Protocol Assessment PRN for score 0-3 or on second treatment, BID, and PRN for scores above 3.    No Indications - adjust the frequency to every 6 hours PRN wheezing or bronchospasm, if no treatments needed after 48 hours then discontinue using Per Protocol order mode.     If indication present, adjust the RT bronchodilator orders based on the Bronchodilator Assessment Score as indicated below.  Use Inhaler orders unless patient has one or more of the following: on home nebulizer, not able to hold breath for 10 seconds, is not alert and oriented, cannot activate and use MDI correctly, or respiratory rate 25 breaths per minute or more, then use the equivalent nebulizer order(s) with same Frequency and PRN reasons based on the score.  If a patient is on this

## 2025-02-02 NOTE — PROGRESS NOTES
Pt slept well. Still has not had BM.  Pt doesn't have urge to void but once walked to bathroom voids

## 2025-02-02 NOTE — PROGRESS NOTES
Admit: 1/30/2025    Name:  Sandor Early  Room:  [facility]3013-  MRN:    [id number]    Critical Care Daily Progress Note for 2/2/2025   Admitted with influenza A,acute copd exacerbation  Placed on BiPAP  Interval History:   1/31:  Used BiPAP at night   Now on 3 L of O2   Feels better     2/1: He is doing better.  Still has some chest tightness.  Much improved.  Has a cough.  No fever or chills.  On 4L.    2/2:   Still on 4L.  Did not use BiPAP overnight.  Still feels tired.  Having some abdominal pain. He is constipated    Scheduled Meds:   predniSONE  30 mg Oral Daily    SMOG Enema  330 mL Rectal Once    albuterol  2.5 mg Nebulization 4x Daily RT    oseltamivir 6mg/ml  75 mg Oral BID    LORazepam  2 mg Oral Once    sodium chloride flush  5-40 mL IntraVENous 2 times per day    enoxaparin  40 mg SubCUTAneous Daily    guaiFENesin  600 mg Oral BID    insulin lispro  0-4 Units SubCUTAneous 4x Daily AC & HS    nicotine  1 patch TransDERmal Daily    budesonide  0.5 mg Nebulization BID RT    gabapentin  800 mg Oral 4x Daily    dilTIAZem  180 mg Oral Daily    allopurinol  300 mg Oral Daily    vitamin D3  5,000 Units Oral Daily    furosemide  20 mg Oral Daily    folic acid  1,000 mcg Oral Daily    ferrous sulfate  325 mg Oral BID WC    insulin glargine  15 Units SubCUTAneous Nightly    DULoxetine  60 mg Oral Daily    atorvastatin  40 mg Oral Daily    metoprolol succinate  25 mg Oral Daily    budesonide-formoterol  2 puff Inhalation BID RT    And    tiotropium  2 puff Inhalation Daily RT       Continuous Infusions:   sodium chloride      dextrose         PRN Meds:  sennosides-docusate sodium, sodium chloride flush, sodium chloride, ondansetron **OR** ondansetron, acetaminophen **OR** acetaminophen, benzonatate, albuterol, dextrose bolus **OR** dextrose bolus, glucagon (rDNA), dextrose, potassium chloride **OR** potassium chloride, magnesium sulfate, polyethylene glycol, calcium carbonate, oxyCODONE HCl, glucose

## 2025-02-02 NOTE — PROGRESS NOTES
Dr. Mckeon made aware of abd xray results. Patient states he wants to wait a bit longer for miralax to work and if not he is agreeable to this RN giving smog enema.

## 2025-02-02 NOTE — PROGRESS NOTES
Shift assessment completed, see flowsheets. AM meds given per orders. Patient denies pain or further needs at this time. Patient currently awake in bed, call light and personal belongings within reach.

## 2025-02-02 NOTE — PROGRESS NOTES
Patient up to toilet, did not want to sit and attempt to have bowel movement. This RN suggested to go ahead and take enema, patient states he will take it after dinner time.

## 2025-02-03 LAB
GLUCOSE BLD-MCNC: 163 MG/DL (ref 70–99)
GLUCOSE BLD-MCNC: 170 MG/DL (ref 70–99)
GLUCOSE BLD-MCNC: 174 MG/DL (ref 70–99)
GLUCOSE BLD-MCNC: 177 MG/DL (ref 70–99)
GLUCOSE BLD-MCNC: 183 MG/DL (ref 70–99)
PERFORMED ON: ABNORMAL

## 2025-02-03 PROCEDURE — 6370000000 HC RX 637 (ALT 250 FOR IP): Performed by: INTERNAL MEDICINE

## 2025-02-03 PROCEDURE — 99406 BEHAV CHNG SMOKING 3-10 MIN: CPT | Performed by: INTERNAL MEDICINE

## 2025-02-03 PROCEDURE — 1200000000 HC SEMI PRIVATE

## 2025-02-03 PROCEDURE — 6360000002 HC RX W HCPCS

## 2025-02-03 PROCEDURE — 6360000002 HC RX W HCPCS: Performed by: INTERNAL MEDICINE

## 2025-02-03 PROCEDURE — 94761 N-INVAS EAR/PLS OXIMETRY MLT: CPT

## 2025-02-03 PROCEDURE — 94640 AIRWAY INHALATION TREATMENT: CPT

## 2025-02-03 PROCEDURE — 2500000003 HC RX 250 WO HCPCS

## 2025-02-03 PROCEDURE — 6370000000 HC RX 637 (ALT 250 FOR IP)

## 2025-02-03 PROCEDURE — 92526 ORAL FUNCTION THERAPY: CPT

## 2025-02-03 PROCEDURE — 99232 SBSQ HOSP IP/OBS MODERATE 35: CPT | Performed by: INTERNAL MEDICINE

## 2025-02-03 PROCEDURE — 99233 SBSQ HOSP IP/OBS HIGH 50: CPT | Performed by: INTERNAL MEDICINE

## 2025-02-03 PROCEDURE — 2700000000 HC OXYGEN THERAPY PER DAY

## 2025-02-03 RX ORDER — LACTULOSE 10 G/15ML
20 SOLUTION ORAL ONCE
Status: COMPLETED | OUTPATIENT
Start: 2025-02-03 | End: 2025-02-03

## 2025-02-03 RX ORDER — ALBUTEROL SULFATE 90 UG/1
2 INHALANT RESPIRATORY (INHALATION) EVERY 4 HOURS PRN
Status: DISCONTINUED | OUTPATIENT
Start: 2025-02-03 | End: 2025-02-15

## 2025-02-03 RX ORDER — ALBUTEROL SULFATE 0.83 MG/ML
2.5 SOLUTION RESPIRATORY (INHALATION) EVERY 4 HOURS PRN
Status: DISCONTINUED | OUTPATIENT
Start: 2025-02-03 | End: 2025-02-03

## 2025-02-03 RX ADMIN — METOPROLOL SUCCINATE 25 MG: 25 TABLET, EXTENDED RELEASE ORAL at 08:03

## 2025-02-03 RX ADMIN — GUAIFENESIN 600 MG: 600 TABLET, EXTENDED RELEASE ORAL at 07:59

## 2025-02-03 RX ADMIN — BUDESONIDE 500 MCG: 0.5 INHALANT RESPIRATORY (INHALATION) at 07:18

## 2025-02-03 RX ADMIN — MINERAL OIL 330 ML: 999 LIQUID ORAL at 09:19

## 2025-02-03 RX ADMIN — ALBUTEROL SULFATE 2.5 MG: 2.5 SOLUTION RESPIRATORY (INHALATION) at 07:18

## 2025-02-03 RX ADMIN — Medication 5000 UNITS: at 07:59

## 2025-02-03 RX ADMIN — SODIUM CHLORIDE, PRESERVATIVE FREE 10 ML: 5 INJECTION INTRAVENOUS at 22:09

## 2025-02-03 RX ADMIN — ONDANSETRON 4 MG: 2 INJECTION INTRAMUSCULAR; INTRAVENOUS at 14:44

## 2025-02-03 RX ADMIN — FOLIC ACID 1000 MCG: 1 TABLET ORAL at 07:59

## 2025-02-03 RX ADMIN — ALBUTEROL SULFATE 2 PUFF: 90 AEROSOL, METERED RESPIRATORY (INHALATION) at 13:46

## 2025-02-03 RX ADMIN — GABAPENTIN 800 MG: 400 CAPSULE ORAL at 16:43

## 2025-02-03 RX ADMIN — LACTULOSE 20 G: 10 SOLUTION ORAL at 12:41

## 2025-02-03 RX ADMIN — GABAPENTIN 800 MG: 400 CAPSULE ORAL at 07:58

## 2025-02-03 RX ADMIN — BUDESONIDE AND FORMOTEROL FUMARATE DIHYDRATE 2 PUFF: 160; 4.5 AEROSOL RESPIRATORY (INHALATION) at 19:28

## 2025-02-03 RX ADMIN — Medication 75 MG: at 08:10

## 2025-02-03 RX ADMIN — DILTIAZEM HYDROCHLORIDE 180 MG: 180 CAPSULE, COATED, EXTENDED RELEASE ORAL at 07:59

## 2025-02-03 RX ADMIN — ATORVASTATIN CALCIUM 40 MG: 40 TABLET, FILM COATED ORAL at 07:58

## 2025-02-03 RX ADMIN — SODIUM CHLORIDE, PRESERVATIVE FREE 10 ML: 5 INJECTION INTRAVENOUS at 08:17

## 2025-02-03 RX ADMIN — BUDESONIDE AND FORMOTEROL FUMARATE DIHYDRATE 2 PUFF: 160; 4.5 AEROSOL RESPIRATORY (INHALATION) at 07:19

## 2025-02-03 RX ADMIN — FERROUS SULFATE TAB 325 MG (65 MG ELEMENTAL FE) 325 MG: 325 (65 FE) TAB at 07:59

## 2025-02-03 RX ADMIN — Medication 75 MG: at 22:38

## 2025-02-03 RX ADMIN — ALBUTEROL SULFATE 2 PUFF: 90 AEROSOL, METERED RESPIRATORY (INHALATION) at 19:28

## 2025-02-03 RX ADMIN — DULOXETINE 60 MG: 60 CAPSULE, DELAYED RELEASE ORAL at 07:59

## 2025-02-03 RX ADMIN — ALLOPURINOL 300 MG: 300 TABLET ORAL at 07:59

## 2025-02-03 RX ADMIN — GABAPENTIN 800 MG: 400 CAPSULE ORAL at 22:06

## 2025-02-03 RX ADMIN — GABAPENTIN 800 MG: 400 CAPSULE ORAL at 12:41

## 2025-02-03 RX ADMIN — TIOTROPIUM BROMIDE INHALATION SPRAY 2 PUFF: 3.12 SPRAY, METERED RESPIRATORY (INHALATION) at 07:19

## 2025-02-03 RX ADMIN — GUAIFENESIN 600 MG: 600 TABLET, EXTENDED RELEASE ORAL at 22:06

## 2025-02-03 RX ADMIN — PREDNISONE 30 MG: 20 TABLET ORAL at 07:58

## 2025-02-03 RX ADMIN — INSULIN GLARGINE 15 UNITS: 100 INJECTION, SOLUTION SUBCUTANEOUS at 22:06

## 2025-02-03 RX ADMIN — FERROUS SULFATE TAB 325 MG (65 MG ELEMENTAL FE) 325 MG: 325 (65 FE) TAB at 16:43

## 2025-02-03 RX ADMIN — FUROSEMIDE 20 MG: 20 TABLET ORAL at 07:59

## 2025-02-03 RX ADMIN — ENOXAPARIN SODIUM 40 MG: 100 INJECTION SUBCUTANEOUS at 07:59

## 2025-02-03 ASSESSMENT — PAIN SCALES - GENERAL: PAINLEVEL_OUTOF10: 0

## 2025-02-03 NOTE — PROGRESS NOTES
[facility] Pulmonary, Critical Care and Sleep Specialists                                 Pulmonary/Critical care  Consult /Progress Note :                                                                  CC COPD and flu and SOB   Patient is being seen at the request of Dr Richey  for a consultation for Acute hypoxic ,hypercapnic resp failure    Subjective:  C/o constipation; had an enema today  Very SOb when getting up to the bathroom    PHYSICAL EXAM:  Vitals:    02/03/25 0800   BP:    Pulse: (!) 115   Resp: 22   Temp:    SpO2: 96%     Gen: No distress.   Eyes: PERRL. No sclera icterus. No conjunctival injection.   ENT: No discharge. Pharynx clear.   Neck: Trachea midline. No obvious mass.    Resp:Rhonchi   CV: Regular rate. Regular rhythm. No murmur or rub. No edema. Peripheral pulses are 2+.  Capillary refill is less than 3 seconds.  GI: Non-tender. Non-distended. No hernia.   Skin: Warm and dry. No nodule on exposed extremities.   Lymph: No cervical LAD. No supraclavicular LAD.   M/S: No cyanosis. No joint deformity. No clubbing.   Neuro: Awake. Alert. Moves all four extremities.   Psych: Oriented x 3. No anxiety.     LABS:  CBC:   Recent Labs     02/01/25  0446 02/02/25  0443   WBC 10.7 13.5*   HGB 14.8 14.8   HCT 43.7 43.9   MCV 93.3 92.0    176     BMP:   Recent Labs     02/01/25  0446 02/02/25  0443   * 134*   K 4.0 4.3   CL 94* 91*   CO2 29 32   BUN 39* 39*   CREATININE 1.2 1.0     LIVER PROFILE:   No results for input(s): \"AST\", \"ALT\", \"LIPASE\", \"AMYLASE\", \"BILIDIR\", \"BILITOT\", \"ALKPHOS\" in the last 72 hours.    Invalid input(s): \"ALB\"    Flu +     Imaging:  Chest imaging was reviewed by me and showed No acute Process    ASSESSMENT:  Acute Hypoxic Hypercapnic resp failure  Acute COPD exacerbation  Flu A +   Tobacco abuse      PLAN:    Supplemental oxygen to maintain SaO2 >92%; wean as tolerated    Tamiflu  Tobacco cessation counseling > 3

## 2025-02-03 NOTE — PROGRESS NOTES
Shift assessment complete. Call light and bedside table within reach. Writer offered bed bath, pt refusing at this time. Pt c/o constipation. Offered PRN miralax and senokot. Pt states he wants another enema. Writer will discuss with MD during rounds this am. Electronically signed by Nevaeh Solomon RN on 2/3/2025 at 8:17 AM

## 2025-02-03 NOTE — PROGRESS NOTES
Transfer shift report given to Ramila MEEKS. Pt in stable condition, care transferred at this time. Electronically signed by Nevaeh Solomon RN on 2/3/2025 at 10:51 AM

## 2025-02-03 NOTE — PLAN OF CARE
Problem: Chronic Conditions and Co-morbidities  Goal: Patient's chronic conditions and co-morbidity symptoms are monitored and maintained or improved  2/2/2025 2039 by Maile Diego RN  Outcome: Progressing  2/2/2025 1343 by Cyndie Sullivan RN  Outcome: Progressing     Problem: Discharge Planning  Goal: Discharge to home or other facility with appropriate resources  2/2/2025 2039 by Maile Diego RN  Outcome: Progressing  2/2/2025 1343 by Cyndie Sullivan RN  Outcome: Progressing     Problem: Safety - Adult  Goal: Free from fall injury  2/2/2025 2039 by Maile Diego RN  Outcome: Progressing  2/2/2025 1343 by Cyndie Sullivan RN  Outcome: Progressing     Problem: Pain  Goal: Verbalizes/displays adequate comfort level or baseline comfort level  2/2/2025 2039 by Maile Diego RN  Outcome: Progressing  2/2/2025 1343 by Cyndie Sullivan RN  Outcome: Progressing

## 2025-02-03 NOTE — PROGRESS NOTES
Speech Language Pathology  Swallowing Disorders and Dysphagia    Dysphagia Treatment/Follow-Up Note  Facility/Department: [address] MEDICAL-SURGICAL    Recommendations: SLP recommends to continue with IDDSI 5 Minced and moist Solids; IDDSI 0 Thin Liquids; Meds PO as tolerated. Pt continues to prefer minced and moist solids when he has an appetite.  Risk Management: upright for all intake, stay upright for at least 30 mins after intake, small bites/sips, oral care 2-3x/day to reduce adverse affects in the event of aspiration, increase physical mobility as able, alternate bites/sips, slow rate of intake, general GERD precautions, general aspiration precautions, and hold PO and contact SLP if s/s of aspiration or worsening respiratory status develop.      NAME:Sandor Early  : 1955 (69 y.o.)   MRN: [id number]  ROOM:   ADMISSION DATE: 2025  PATIENT DIAGNOSIS(ES): Hypomagnesemia [E83.42]  Influenza A [J10.1]  COPD exacerbation (HCC) [J44.1]  Respiratory failure with hypoxia [J96.91]  Alcohol use disorder [F10.90]  Acute on chronic respiratory failure with hypoxia and hypercapnia [J96.21, J96.22]  Acute respiratory failure [J96.00]  Allergies   Allergen Reactions    Lisinopril Swelling and Other (See Comments)    Ace Inhibitors Swelling and Other (See Comments)    Influenza Vaccines Other (See Comments)     He states he was hospitalized when he received his first flu vaccine    Tiotropium Bromide Monohydrate Swelling and Other (See Comments)     Tolerates both tudorza and incruse  On Trelegy.    Vilanterol        DATE ONSET: 2025    Pain: The patient complains of pain, abd 10/10 ; requesting something to make bowels move . RN notified      Current Diet: ADULT DIET; Dysphagia - Minced and Moist; 3 carb choices (45 gm/meal)      Dysphagia Treatment and Impressions:  Subjective: Pt seen in room at bedside with RN Mariela) permission  Noteworthy events reported/Chart Review: Pt now on 2

## 2025-02-03 NOTE — PROGRESS NOTES
Transport here to take patient to room 220. Patient already updated his brother of the move. Pt in stable condition at the time of transfer. Electronically signed by Nevaeh Solomon RN on 2/3/2025 at 11:27 AM

## 2025-02-03 NOTE — PROGRESS NOTES
4 Eyes Skin Assessment     NAME:  Sandor Early  YOB: 1955  MEDICAL RECORD NUMBER:  [id number]    The patient is being assessed for  Shift Handoff    I agree that at least one RN has performed a thorough Head to Toe Skin Assessment on the patient. ALL assessment sites listed below have been assessed.      Areas assessed by both nurses:    Head, Face, Ears, Shoulders, Back, Chest, Arms, Elbows, Hands, Sacrum. Buttock, Coccyx, Ischium, Legs. Feet and Heels, and Under Medical Devices         Does the Patient have a Wound? No noted wound(s)       Abraham Prevention initiated by RN: Yes  Wound Care Orders initiated by RN: No    Pressure Injury (Stage 3,4, Unstageable, DTI, NWPT, and Complex wounds) if present, place Wound referral order by RN under : No    New Ostomies, if present place, Ostomy referral order under : No     Nurse 1 eSignature: Electronically signed by Maile Diego RN on 2/3/25 at 5:43 AM EST    **SHARE this note so that the co-signing nurse can place an eSignature**    Nurse 2 eSignature: Electronically signed by Gia Panchal RN on 2/3/25 at 5:51 AM EST

## 2025-02-03 NOTE — FLOWSHEET NOTE
02/03/25 1139   Vital Signs   Temp 98.1 °F (36.7 °C)   Temp Source Oral   Pulse (!) 115   Heart Rate Source Monitor   Respirations 18   BP (!) 148/76   MAP (Calculated) 100   Pain Assessment   Pain Assessment None - Denies Pain   Care Plan - Pain Goals   Verbalizes/displays adequate comfort level or baseline comfort level Encourage patient to monitor pain and request assistance;Assess pain using appropriate pain scale;Administer analgesics based on type and severity of pain and evaluate response;Implement non-pharmacological measures as appropriate and evaluate response;Consider cultural and social influences on pain and pain management;Notify Licensed Independent Practitioner if interventions unsuccessful or patient reports new pain   Opioid-Induced Sedation   POSS Score 1   Oxygen Therapy   SpO2 96 %   O2 Device Nasal cannula   O2 Flow Rate (L/min) 4 L/min     Patient transferred to room 220 from ICU. VSS. A/O. Skin flushed warm and dry. Scrotum red. Abdomen distended, taunt, BS +, passing flatus. Voiding clear beryl urine. Smog enema this AM per ICU nurse. Patient stated he had small results. Medicated as ordered. Lung sounds with rhonchi, moist cough, non productive. Oriented to room, call bell and bedside table within reach. Bed alarm on.

## 2025-02-03 NOTE — PLAN OF CARE
Problem: Chronic Conditions and Co-morbidities  Goal: Patient's chronic conditions and co-morbidity symptoms are monitored and maintained or improved  2/3/2025 1156 by Liliam Almanza, RN  Outcome: Progressing  Flowsheets (Taken 2/3/2025 1147)  Care Plan - Patient's Chronic Conditions and Co-Morbidity Symptoms are Monitored and Maintained or Improved:   Monitor and assess patient's chronic conditions and comorbid symptoms for stability, deterioration, or improvement   Collaborate with multidisciplinary team to address chronic and comorbid conditions and prevent exacerbation or deterioration   Update acute care plan with appropriate goals if chronic or comorbid symptoms are exacerbated and prevent overall improvement and discharge  2/3/2025 0825 by Nevaeh Solomon, RN  Outcome: Progressing     Problem: Discharge Planning  Goal: Discharge to home or other facility with appropriate resources  2/3/2025 1156 by Liliam Almanza, RN  Outcome: Progressing  Flowsheets (Taken 2/3/2025 1147)  Discharge to home or other facility with appropriate resources:   Identify barriers to discharge with patient and caregiver   Arrange for needed discharge resources and transportation as appropriate   Identify discharge learning needs (meds, wound care, etc)   Arrange for interpreters to assist at discharge as needed   Refer to discharge planning if patient needs post-hospital services based on physician order or complex needs related to functional status, cognitive ability or social support system  2/3/2025 0825 by Nevaeh Solomon, RN  Outcome: Progressing     Problem: Safety - Adult  Goal: Free from fall injury  2/3/2025 1156 by Liliam Almanza, RN  Outcome: Progressing  2/3/2025 0825 by Nevaeh Solomon, RN  Outcome: Progressing     Problem: Pain  Goal: Verbalizes/displays adequate comfort level or baseline comfort level  Outcome: Progressing  Flowsheets (Taken 2/3/2025 1139)  Verbalizes/displays adequate comfort

## 2025-02-03 NOTE — PROGRESS NOTES
02/02/25 1900   RT Protocol   History Pulmonary Disease 2   Respiratory pattern 2   Breath sounds 2   Cough 1   Indications for Bronchodilator Therapy Decreased or absent breath sounds   Bronchodilator Assessment Score 7     RT Inhaler-Nebulizer Bronchodilator Protocol Note    There is a bronchodilator order in the chart from a provider indicating to follow the RT Bronchodilator Protocol and there is an “Initiate RT Inhaler-Nebulizer Bronchodilator Protocol” order as well (see protocol at bottom of note).    CXR Findings:  No results found.    The findings from the last RT Protocol Assessment were as follows:   History Pulmonary Disease: Chronic pulmonary disease  Respiratory Pattern: Dyspnea on exertion or RR 21-25 bpm  Breath Sounds: Slightly diminished and/or crackles  Cough: Strong, productive  Indication for Bronchodilator Therapy: Decreased or absent breath sounds  Bronchodilator Assessment Score: 7    Aerosolized bronchodilator medication orders have been revised according to the RT Inhaler-Nebulizer Bronchodilator Protocol below.    Respiratory Therapist to perform RT Therapy Protocol Assessment initially then follow the protocol.  Repeat RT Therapy Protocol Assessment PRN for score 0-3 or on second treatment, BID, and PRN for scores above 3.    No Indications - adjust the frequency to every 6 hours PRN wheezing or bronchospasm, if no treatments needed after 48 hours then discontinue using Per Protocol order mode.     If indication present, adjust the RT bronchodilator orders based on the Bronchodilator Assessment Score as indicated below.  Use Inhaler orders unless patient has one or more of the following: on home nebulizer, not able to hold breath for 10 seconds, is not alert and oriented, cannot activate and use MDI correctly, or respiratory rate 25 breaths per minute or more, then use the equivalent nebulizer order(s) with same Frequency and PRN reasons based on the score.  If a patient is on this

## 2025-02-03 NOTE — FLOWSHEET NOTE
02/03/25 1430   Vital Signs   Temp 97.2 °F (36.2 °C)   Temp Source Oral   Pulse (!) 118   Heart Rate Source Monitor   Respirations 20   BP (!) 146/73   MAP (Calculated) 97   Pain Assessment   Pain Assessment None - Denies Pain   Care Plan - Pain Goals   Verbalizes/displays adequate comfort level or baseline comfort level Encourage patient to monitor pain and request assistance;Assess pain using appropriate pain scale;Administer analgesics based on type and severity of pain and evaluate response;Implement non-pharmacological measures as appropriate and evaluate response;Consider cultural and social influences on pain and pain management;Notify Licensed Independent Practitioner if interventions unsuccessful or patient reports new pain   Opioid-Induced Sedation   POSS Score 1   Oxygen Therapy   SpO2 98 %   O2 Device Nasal cannula   O2 Flow Rate (L/min) 4 L/min     VSS. Patient with poor appetite. Watching TV. Non productive wet cough. Incontinence of clear beryl urine. Call bell and bedside table within reach. Bed alarm on. Will continue to monitor.

## 2025-02-03 NOTE — PROGRESS NOTES
Patient provided a COPD Educational Folder that includes the following materials:     [x]  [employer] Booklet: Managing your COPD  [x]  ALA: Getting the Most Out of Medication Delivery Devices  [x]  ALA: My COPD Action Plan  [x]  Better Breathers Club: [facility]   [x]  Smoking Cessation Classes  [x]  Outpatient Spiritual Care Services  [x]  Magnet: Signs of COPD    PATIENT/CAREGIVER TEACHING:   Level of patient/caregiver understanding able to:   [x] Verbalize understanding   [] Demonstrate understanding       [] Teach back        [] Needs reinforcement     []  Other:     Electronically signed by Liliam Eli RN on 2/3/2025 at 11:56 AM

## 2025-02-03 NOTE — PLAN OF CARE
Problem: Chronic Conditions and Co-morbidities  Goal: Patient's chronic conditions and co-morbidity symptoms are monitored and maintained or improved  2/3/2025 0825 by Nevaeh Solomon, RN  Outcome: Progressing    Problem: Discharge Planning  Goal: Discharge to home or other facility with appropriate resources  2/3/2025 0825 by Nevaeh Solomon, RN  Outcome: Progressing    Problem: Safety - Adult  Goal: Free from fall injury  2/3/2025 0825 by Nevaeh Solomon, RN  Outcome: Progressing

## 2025-02-03 NOTE — PROGRESS NOTES
02/03/25 0700   RT Protocol   History Pulmonary Disease 2   Respiratory pattern 2   Breath sounds 2   Cough 1   Indications for Bronchodilator Therapy Decreased or absent breath sounds   Bronchodilator Assessment Score 7

## 2025-02-03 NOTE — PROGRESS NOTES
Admit: 2025    Name:  Sandor Early  Room:  [address] [place] [address]  MRN:    [id number]    Critical Care Daily Progress Note for 2/3/2025     Admitted with influenza A,acute copd exacerbation  Placed on BiPAP    Interval History:     Breathing stable on home o2 of  4L.  Did not use BiPAP overnight.  Still feels tired and constipated     Enema given for constipation with some  results and asking for it again     Scheduled Meds:   predniSONE  30 mg Oral Daily    albuterol  2.5 mg Nebulization 4x Daily RT    oseltamivir 6mg/ml  75 mg Oral BID    LORazepam  2 mg Oral Once    sodium chloride flush  5-40 mL IntraVENous 2 times per day    enoxaparin  40 mg SubCUTAneous Daily    guaiFENesin  600 mg Oral BID    insulin lispro  0-4 Units SubCUTAneous 4x Daily AC & HS    nicotine  1 patch TransDERmal Daily    budesonide  0.5 mg Nebulization BID RT    gabapentin  800 mg Oral 4x Daily    dilTIAZem  180 mg Oral Daily    allopurinol  300 mg Oral Daily    vitamin D3  5,000 Units Oral Daily    furosemide  20 mg Oral Daily    folic acid  1,000 mcg Oral Daily    ferrous sulfate  325 mg Oral BID WC    insulin glargine  15 Units SubCUTAneous Nightly    DULoxetine  60 mg Oral Daily    atorvastatin  40 mg Oral Daily    metoprolol succinate  25 mg Oral Daily    budesonide-formoterol  2 puff Inhalation BID RT    And    tiotropium  2 puff Inhalation Daily RT       Continuous Infusions:   sodium chloride      dextrose         PRN Meds:  sennosides-docusate sodium, sodium chloride flush, sodium chloride, ondansetron **OR** ondansetron, acetaminophen **OR** acetaminophen, benzonatate, albuterol, dextrose bolus **OR** dextrose bolus, glucagon (rDNA), dextrose, potassium chloride **OR** potassium chloride, magnesium sulfate, polyethylene glycol, calcium carbonate, oxyCODONE HCl, glucose           Objective:     Temp  Av.8 °F (36.6 °C)  Min: 97.4 °F (36.3 °C)  Max: 98.3 °F (36.8 °C)  Pulse  Av.8  Min: 90  Max: 131  BP  Min: 135/65  Max:

## 2025-02-03 NOTE — PROGRESS NOTES
SMOG enema administered. Small amount of stool passed. Pt states he knows there is more, his abdomen still hurts. Perfect serve sent to . Electronically signed by Nevaeh Solomon RN on 2/3/2025 at 10:20 AM     Paroxysmal atrial fibrillation (HCC)     Pericardial effusion     possibly uremic,resolved    Sepsis ([address]     history of sepsis    Staphylococcus aureus bacteremia with sepsis ([address]     Status post removal of infected vein from right upper arm.     Thyroid disease      Past Surgical History:   Procedure Laterality Date    ARTERIAL BYPASS SURGRY  03/16/07 [facility]    Creation of left brachial to mediam basilic AV shunt 4 x 7 PTFE    ARTERIAL BYPASS SURGRY  03/16/2007 [facility]    Creation of left brachial to median basilic shunt with 4 x 7 PTFE    CATHETER REMOVAL  06/27/07    Removal of Perma-cath    CATHETER REMOVAL  10/29/12 [facility]    Removal of right tunneled cuffed Perma-cath    CHOLECYSTECTOMY      CHOLECYSTECTOMY, LAPAROSCOPIC  9/30/15    COLONOSCOPY  09/08/2015    Dr. Randi Michaels HISTORY  11/29/12 [facility] [place]    Placement of PD cath    MS EGD TRANSORAL BIOPSY SINGLE/MULTIPLE N/A 11/22/2016    Dr Barnes-w/dilation over wire, 47 Citizen of the [place], jagged z-line-Lupe(-), mild to moderate chronic active esophagogastritis with intestinal    THROMBECTOMY  08/09/07 [facility]    Thrombectomy of left arm dialysis shunt with intraoperative arteriography & venography so as to ascertain in-flow & out-flow to the shunt    THROMBECTOMY  12/20/07 [facility]    Thrombectomy of AV access    THROMBECTOMY  12/26/07 [facility]    Exploration with endarterectomy & vein patch a'plasty brachial artery, thrombectomy of left forearm dialysis shunt    THROMBECTOMY  10/02/07 [facility]    Thrombectomy of left brachialcephalic shunt    THROMBECTOMY  08/28/07 [facility]    Thrombectomy with revision of left arm shunt with jump graft 8mm [place]-reza ringed PTFE to above the elbow deep brachial vein    TUNNELED VENOUS CATHETER PLACEMENT  01/08/08 [facility]    Right IJ Perma-cath    TUNNELED VENOUS CATHETER PLACEMENT  10- [facility]    Placement of right femoral vein tunneled dialysis catheter, removal of poorly functioning right femoral vein tunneled dialysis catheter    TUNNELED VENOUS CATHETER PLACEMENT  11- [facility]    Left femoral vein tunneled dialysis catheter placement ([employer] Equistream SK 42cm tip to cuff)    UPPER GASTROINTESTINAL ENDOSCOPY  09/08/2015    Dr. Andino Hidden ENDOSCOPY  11/22/2016    Dr Barnes-w/dilation over wire, 47 [place], jagged z-line-Lupe(-), Moore's (+)--1st dx, (-) dysplasia, 1 yr recall    VASCULAR SURGERY  11- [facility]    Superior vena cava venograms. Left internal jugular vein tunneled dialysis catheter placement ([employer] 31 cm tip to cuff). Removal of poorly functioning left internal juglar vein tunneled dialysis catheter.  VASCULAR SURGERY  9-14-12 [facility]    Removal of left interanal jugular vein tunneled dialysis catheter    VASCULAR SURGERY  9-17-12 [facility]    1. Bilateral interanl jugular vein ultrasound 2. left internal jugu;ar venio venograms 3.  Right femoral vein tunnel dialysis catheter placement ([employer] EQUISTREAM 35 cm tip to cuff)    VASCULAR SURGERY  12- [facility]    Removal of left femoral vein tunnel dialysis catheter    VASCULAR SURGERY  12- [facility]    us guided cannulaton of right femoral vein, right femoral vein tunnel dialysis catheter placement, ([employer] Equistream XK 42 cm tip to cuff)    VASCULAR SURGERY  2- [facility]    removal of right femoral vein tunnel dialysis catheter     Social History     Social History    Marital status:      Spouse name: N/A    Number of children: N/A    Years of education: N/A     Social History Main Topics    Smoking status: Never Smoker    Smokeless tobacco: Never Used    Alcohol use Yes      Comment: little-rare    Drug use: No    Sexual activity: Not Asked     Other Topics Concern    None     Social History Narrative     Allergies   Allergen Reactions    Bethanechol Other (See Comments)     hypotension     Current Outpatient Prescriptions   Medication Sig Dispense Refill    HYDROcodone-acetaminophen (NORCO)  MG per tablet TAKE 1 TABLET BY MOUTH EVERY DAY AS NEEDED FOR PAIN. 30 tablet 0    azithromycin (ZITHROMAX) 500 MG tablet       warfarin (COUMADIN) 2.5 MG tablet TAKE ONE TABLET BY MOUTH EVERY DAY OR AS DIRECTED 60 tablet 5    esomeprazole (NEXIUM) 20 MG delayed release capsule Take 1 capsule by mouth daily 30 capsule 11    midodrine (PROAMATINE) 10 MG tablet TAKE 1 TABLET BY MOUTH 3 TIMES DAILY 60 tablet 5    colestipol (COLESTID) 1 G tablet Take 2 tablets 2x daily for loose stools 120 tablet 11    vitamin D (CHOLECALCIFEROL) 5000 UNITS CAPS capsule Take 5,000 Units by mouth daily      levothyroxine (SYNTHROID) 175 MCG tablet Take 1 tablet by mouth daily 30 tablet 11    Omega 3 1000 MG CAPS Take by mouth      B Complex-C-Folic Acid (RENAL PO) Take by mouth      gentamicin (GARAMYCIN) 0.1 % cream       Multiple Vitamins-Minerals (RENAL) TABS Take 1 tablet by mouth daily       magnesium oxide (MAG-OX) 400 MG tablet Take 400 mg by mouth daily.  calcitRIOL (ROCALTROL) 0.5 MCG capsule Take 0.5 mcg by mouth every other day.  SENSIPAR 30 MG tablet Take 60 mg by mouth daily       aspirin 81 MG EC tablet Take 81 mg by mouth daily.  lanthanum (FOSRENOL) 1000 MG chewable tablet Take 1,000 mg by mouth 3 times daily (with meals).  sevelamer (RENVELA) 800 MG tablet Take 1 tablet by mouth 3 times daily. No current facility-administered medications for this visit. Review of Systems   Constitutional: Negative for appetite change, chills, fatigue, fever and unexpected weight change. HENT: Negative for congestion, hearing loss, rhinorrhea, sore throat and trouble swallowing. Eyes: Negative for discharge and visual disturbance. Respiratory: Negative for cough, shortness of breath and wheezing. Cardiovascular: Negative for chest pain and palpitations. Gastrointestinal: Positive for diarrhea (chronic/controlled).  Negative for abdominal distention, abdominal pain, anal bleeding, blood in stool, constipation, nausea, rectal pain and vomiting. Endocrine: Negative for polydipsia, polyphagia and polyuria. Genitourinary: Negative for dysuria, frequency, hematuria and urgency. Musculoskeletal: Negative for arthralgias, back pain and myalgias. Skin: Negative for rash. Allergic/Immunologic: Negative. Neurological: Negative for dizziness, facial asymmetry, weakness and numbness. Hematological: Negative. Psychiatric/Behavioral: Negative for confusion, dysphoric mood and suicidal ideas. The patient is not nervous/anxious. Objective:   Physical Exam   Constitutional: He is oriented to person, place, and time. He appears well-developed and well-nourished. /74  Pulse 65  Ht 5' 10\" (1.778 m)  Wt (!) 322 lb 6.4 oz (146.2 kg)  SpO2 98%  BMI 46.26 kg/m2     HENT:   Head: Normocephalic and atraumatic. Eyes: Conjunctivae and EOM are normal. Pupils are equal, round, and reactive to light. No scleral icterus. Neck: Normal range of motion. Neck supple. No JVD present. No thyromegaly present. Cardiovascular: Normal rate and regular rhythm. Exam reveals no gallop and no friction rub. No murmur heard. Pulmonary/Chest: Effort normal and breath sounds normal.   Abdominal: Soft. Bowel sounds are normal. He exhibits no distension and no mass. There is no tenderness. There is no rebound and no guarding. peritoneal catheter   Musculoskeletal: Normal range of motion. He exhibits no edema. Neurological: He is alert and oriented to person, place, and time. Skin: Skin is warm and dry. No pallor. Psychiatric: He has a normal mood and affect. Judgment normal.   Nursing note and vitals reviewed. Assessment:      1. 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 Loose stools     3. S/P cholecystectomy             Plan:      1. Schedule outpatient endoscopy. Patient advised no Aspirin, Fish Oil, Vit E or NSAIDs 5 (five) days before procedure.   Follow-up Visit:

## 2025-02-03 NOTE — PLAN OF CARE
Patient provided a COPD Educational Folder that includes the following materials:     [x]  [employer] Booklet: Managing your COPD  [x]  ALA: Getting the Most Out of Medication Delivery Devices  [x]  ALA: My COPD Action Plan  [x]  Better Breathers Club: [facility]   [x]  Smoking Cessation Classes  [x]  Outpatient Spiritual Care Services  [x]  Magnet: Signs of COPD    PATIENT/CAREGIVER TEACHING:   Level of patient/caregiver understanding able to:   [x] Verbalize understanding   [] Demonstrate understanding       [] Teach back        [] Needs reinforcement     []  Other:     Electronically signed by Maile Diego RN on 2/3/2025 at 5:44 AM

## 2025-02-04 ENCOUNTER — APPOINTMENT (OUTPATIENT)
Dept: GENERAL RADIOLOGY | Age: 70
DRG: 207 | End: 2025-02-04
Payer: MEDICARE

## 2025-02-04 ENCOUNTER — APPOINTMENT (OUTPATIENT)
Dept: CT IMAGING | Age: 70
DRG: 207 | End: 2025-02-04
Payer: MEDICARE

## 2025-02-04 LAB
ANION GAP SERPL CALCULATED.3IONS-SCNC: 10 MMOL/L (ref 3–16)
BILIRUB UR QL STRIP.AUTO: NEGATIVE
BUN SERPL-MCNC: 67 MG/DL (ref 7–20)
CALCIUM SERPL-MCNC: 10.4 MG/DL (ref 8.3–10.6)
CHLORIDE SERPL-SCNC: 93 MMOL/L (ref 99–110)
CLARITY UR: CLEAR
CO2 SERPL-SCNC: 36 MMOL/L (ref 21–32)
COLOR UR: YELLOW
CREAT SERPL-MCNC: 1.1 MG/DL (ref 0.8–1.3)
EPI CELLS #/AREA URNS HPF: NORMAL /HPF (ref 0–5)
GFR SERPLBLD CREATININE-BSD FMLA CKD-EPI: 73 ML/MIN/{1.73_M2}
GLUCOSE BLD-MCNC: 177 MG/DL (ref 70–99)
GLUCOSE BLD-MCNC: 178 MG/DL (ref 70–99)
GLUCOSE BLD-MCNC: 187 MG/DL (ref 70–99)
GLUCOSE BLD-MCNC: 187 MG/DL (ref 70–99)
GLUCOSE BLD-MCNC: 191 MG/DL (ref 70–99)
GLUCOSE SERPL-MCNC: 177 MG/DL (ref 70–99)
GLUCOSE UR STRIP.AUTO-MCNC: NEGATIVE MG/DL
HGB UR QL STRIP.AUTO: ABNORMAL
KETONES UR STRIP.AUTO-MCNC: NEGATIVE MG/DL
LEUKOCYTE ESTERASE UR QL STRIP.AUTO: NEGATIVE
MAGNESIUM SERPL-MCNC: 1.88 MG/DL (ref 1.8–2.4)
NITRITE UR QL STRIP.AUTO: NEGATIVE
PERFORMED ON: ABNORMAL
PH UR STRIP.AUTO: 6 [PH] (ref 5–8)
POTASSIUM SERPL-SCNC: 4.4 MMOL/L (ref 3.5–5.1)
PROT UR STRIP.AUTO-MCNC: 30 MG/DL
RBC #/AREA URNS HPF: NORMAL /HPF (ref 0–4)
SODIUM SERPL-SCNC: 139 MMOL/L (ref 136–145)
SP GR UR STRIP.AUTO: 1.01 (ref 1–1.03)
UA COMPLETE W REFLEX CULTURE PNL UR: ABNORMAL
UA DIPSTICK W REFLEX MICRO PNL UR: YES
URN SPEC COLLECT METH UR: ABNORMAL
UROBILINOGEN UR STRIP-ACNC: 0.2 E.U./DL
WBC #/AREA URNS HPF: NORMAL /HPF (ref 0–5)

## 2025-02-04 PROCEDURE — 36415 COLL VENOUS BLD VENIPUNCTURE: CPT

## 2025-02-04 PROCEDURE — 6370000000 HC RX 637 (ALT 250 FOR IP): Performed by: INTERNAL MEDICINE

## 2025-02-04 PROCEDURE — 6360000002 HC RX W HCPCS: Performed by: INTERNAL MEDICINE

## 2025-02-04 PROCEDURE — 99232 SBSQ HOSP IP/OBS MODERATE 35: CPT | Performed by: INTERNAL MEDICINE

## 2025-02-04 PROCEDURE — 80048 BASIC METABOLIC PNL TOTAL CA: CPT

## 2025-02-04 PROCEDURE — 83735 ASSAY OF MAGNESIUM: CPT

## 2025-02-04 PROCEDURE — 51798 US URINE CAPACITY MEASURE: CPT

## 2025-02-04 PROCEDURE — 6360000002 HC RX W HCPCS

## 2025-02-04 PROCEDURE — 2700000000 HC OXYGEN THERAPY PER DAY

## 2025-02-04 PROCEDURE — 6370000000 HC RX 637 (ALT 250 FOR IP): Performed by: SURGERY

## 2025-02-04 PROCEDURE — 74018 RADEX ABDOMEN 1 VIEW: CPT

## 2025-02-04 PROCEDURE — 99222 1ST HOSP IP/OBS MODERATE 55: CPT | Performed by: SURGERY

## 2025-02-04 PROCEDURE — 1200000000 HC SEMI PRIVATE

## 2025-02-04 PROCEDURE — 2500000003 HC RX 250 WO HCPCS

## 2025-02-04 PROCEDURE — 94640 AIRWAY INHALATION TREATMENT: CPT

## 2025-02-04 PROCEDURE — 2580000003 HC RX 258: Performed by: INTERNAL MEDICINE

## 2025-02-04 PROCEDURE — 6370000000 HC RX 637 (ALT 250 FOR IP)

## 2025-02-04 PROCEDURE — 94761 N-INVAS EAR/PLS OXIMETRY MLT: CPT

## 2025-02-04 PROCEDURE — 81001 URINALYSIS AUTO W/SCOPE: CPT

## 2025-02-04 PROCEDURE — 74177 CT ABD & PELVIS W/CONTRAST: CPT

## 2025-02-04 PROCEDURE — 6360000004 HC RX CONTRAST MEDICATION: Performed by: INTERNAL MEDICINE

## 2025-02-04 RX ORDER — PREDNISONE 20 MG/1
20 TABLET ORAL DAILY
Status: DISCONTINUED | OUTPATIENT
Start: 2025-02-05 | End: 2025-02-04

## 2025-02-04 RX ORDER — BISACODYL 10 MG
10 SUPPOSITORY, RECTAL RECTAL DAILY PRN
Status: DISCONTINUED | OUTPATIENT
Start: 2025-02-04 | End: 2025-02-07

## 2025-02-04 RX ORDER — SODIUM CHLORIDE 9 MG/ML
INJECTION, SOLUTION INTRAVENOUS CONTINUOUS
Status: DISCONTINUED | OUTPATIENT
Start: 2025-02-04 | End: 2025-02-08

## 2025-02-04 RX ORDER — METOPROLOL SUCCINATE 25 MG/1
25 TABLET, EXTENDED RELEASE ORAL ONCE
Status: COMPLETED | OUTPATIENT
Start: 2025-02-04 | End: 2025-02-04

## 2025-02-04 RX ORDER — METOPROLOL SUCCINATE 50 MG/1
50 TABLET, EXTENDED RELEASE ORAL 2 TIMES DAILY
Status: DISCONTINUED | OUTPATIENT
Start: 2025-02-04 | End: 2025-02-15

## 2025-02-04 RX ORDER — MORPHINE SULFATE 2 MG/ML
2 INJECTION, SOLUTION INTRAMUSCULAR; INTRAVENOUS
Status: DISCONTINUED | OUTPATIENT
Start: 2025-02-04 | End: 2025-02-08

## 2025-02-04 RX ORDER — IOPAMIDOL 755 MG/ML
75 INJECTION, SOLUTION INTRAVASCULAR
Status: COMPLETED | OUTPATIENT
Start: 2025-02-04 | End: 2025-02-04

## 2025-02-04 RX ADMIN — TIOTROPIUM BROMIDE INHALATION SPRAY 2 PUFF: 3.12 SPRAY, METERED RESPIRATORY (INHALATION) at 08:29

## 2025-02-04 RX ADMIN — MORPHINE SULFATE 2 MG: 2 INJECTION, SOLUTION INTRAMUSCULAR; INTRAVENOUS at 21:22

## 2025-02-04 RX ADMIN — GABAPENTIN 800 MG: 400 CAPSULE ORAL at 09:46

## 2025-02-04 RX ADMIN — FOLIC ACID 1000 MCG: 1 TABLET ORAL at 09:46

## 2025-02-04 RX ADMIN — INSULIN GLARGINE 15 UNITS: 100 INJECTION, SOLUTION SUBCUTANEOUS at 20:35

## 2025-02-04 RX ADMIN — METOPROLOL SUCCINATE 25 MG: 25 TABLET, EXTENDED RELEASE ORAL at 09:46

## 2025-02-04 RX ADMIN — ENOXAPARIN SODIUM 40 MG: 100 INJECTION SUBCUTANEOUS at 09:45

## 2025-02-04 RX ADMIN — SODIUM CHLORIDE, PRESERVATIVE FREE 10 ML: 5 INJECTION INTRAVENOUS at 09:48

## 2025-02-04 RX ADMIN — DILTIAZEM HYDROCHLORIDE 180 MG: 180 CAPSULE, COATED, EXTENDED RELEASE ORAL at 09:46

## 2025-02-04 RX ADMIN — SODIUM CHLORIDE: 9 INJECTION, SOLUTION INTRAVENOUS at 16:10

## 2025-02-04 RX ADMIN — SENNOSIDES AND DOCUSATE SODIUM 2 TABLET: 50; 8.6 TABLET ORAL at 09:46

## 2025-02-04 RX ADMIN — Medication 5000 UNITS: at 09:46

## 2025-02-04 RX ADMIN — GABAPENTIN 800 MG: 400 CAPSULE ORAL at 13:31

## 2025-02-04 RX ADMIN — MORPHINE SULFATE 2 MG: 2 INJECTION, SOLUTION INTRAMUSCULAR; INTRAVENOUS at 16:17

## 2025-02-04 RX ADMIN — ALBUTEROL SULFATE 2 PUFF: 90 AEROSOL, METERED RESPIRATORY (INHALATION) at 08:32

## 2025-02-04 RX ADMIN — POLYETHYLENE GLYCOL (3350) 17 G: 17 POWDER, FOR SOLUTION ORAL at 09:47

## 2025-02-04 RX ADMIN — IOPAMIDOL 75 ML: 755 INJECTION, SOLUTION INTRAVENOUS at 18:13

## 2025-02-04 RX ADMIN — FERROUS SULFATE TAB 325 MG (65 MG ELEMENTAL FE) 325 MG: 325 (65 FE) TAB at 09:47

## 2025-02-04 RX ADMIN — GABAPENTIN 800 MG: 400 CAPSULE ORAL at 20:35

## 2025-02-04 RX ADMIN — BUDESONIDE AND FORMOTEROL FUMARATE DIHYDRATE 2 PUFF: 160; 4.5 AEROSOL RESPIRATORY (INHALATION) at 20:26

## 2025-02-04 RX ADMIN — PHENOL 1 SPRAY: 1.5 LIQUID ORAL at 20:36

## 2025-02-04 RX ADMIN — FUROSEMIDE 20 MG: 20 TABLET ORAL at 09:46

## 2025-02-04 RX ADMIN — ONDANSETRON 4 MG: 2 INJECTION INTRAMUSCULAR; INTRAVENOUS at 08:53

## 2025-02-04 RX ADMIN — METOPROLOL SUCCINATE 50 MG: 50 TABLET, EXTENDED RELEASE ORAL at 20:35

## 2025-02-04 RX ADMIN — Medication 75 MG: at 10:34

## 2025-02-04 RX ADMIN — ALLOPURINOL 300 MG: 300 TABLET ORAL at 09:46

## 2025-02-04 RX ADMIN — ONDANSETRON 4 MG: 2 INJECTION INTRAMUSCULAR; INTRAVENOUS at 16:17

## 2025-02-04 RX ADMIN — PREDNISONE 30 MG: 20 TABLET ORAL at 09:46

## 2025-02-04 RX ADMIN — BUDESONIDE AND FORMOTEROL FUMARATE DIHYDRATE 2 PUFF: 160; 4.5 AEROSOL RESPIRATORY (INHALATION) at 08:29

## 2025-02-04 RX ADMIN — ANTACID TABLETS 500 MG: 500 TABLET, CHEWABLE ORAL at 03:27

## 2025-02-04 RX ADMIN — DULOXETINE 60 MG: 60 CAPSULE, DELAYED RELEASE ORAL at 09:46

## 2025-02-04 RX ADMIN — METOPROLOL SUCCINATE 25 MG: 25 TABLET, FILM COATED, EXTENDED RELEASE ORAL at 13:56

## 2025-02-04 RX ADMIN — ATORVASTATIN CALCIUM 40 MG: 40 TABLET, FILM COATED ORAL at 09:46

## 2025-02-04 RX ADMIN — BISACODYL 10 MG: 10 SUPPOSITORY RECTAL at 09:46

## 2025-02-04 RX ADMIN — INSULIN LISPRO 1 UNITS: 100 INJECTION, SOLUTION INTRAVENOUS; SUBCUTANEOUS at 20:35

## 2025-02-04 RX ADMIN — GUAIFENESIN 600 MG: 600 TABLET, EXTENDED RELEASE ORAL at 20:35

## 2025-02-04 RX ADMIN — ANTACID TABLETS 500 MG: 500 TABLET, CHEWABLE ORAL at 21:02

## 2025-02-04 RX ADMIN — GUAIFENESIN 600 MG: 600 TABLET, EXTENDED RELEASE ORAL at 09:47

## 2025-02-04 ASSESSMENT — PAIN DESCRIPTION - ORIENTATION
ORIENTATION: MID;LEFT
ORIENTATION: MID

## 2025-02-04 ASSESSMENT — PAIN SCALES - GENERAL
PAINLEVEL_OUTOF10: 6
PAINLEVEL_OUTOF10: 7
PAINLEVEL_OUTOF10: 5
PAINLEVEL_OUTOF10: 10
PAINLEVEL_OUTOF10: 5
PAINLEVEL_OUTOF10: 8
PAINLEVEL_OUTOF10: 8
PAINLEVEL_OUTOF10: 7

## 2025-02-04 ASSESSMENT — PAIN DESCRIPTION - LOCATION
LOCATION: ABDOMEN
LOCATION: THROAT

## 2025-02-04 ASSESSMENT — PAIN DESCRIPTION - DESCRIPTORS
DESCRIPTORS: ACHING
DESCRIPTORS: BURNING
DESCRIPTORS: SHARP

## 2025-02-04 ASSESSMENT — PAIN DESCRIPTION - PAIN TYPE
TYPE: ACUTE PAIN
TYPE: ACUTE PAIN

## 2025-02-04 NOTE — PROGRESS NOTES
Admit: 2025    Name:  Sandor Early  Room:  [address] [place] [address]  MRN:    [id number]     Daily Progress Note for 2025     Admitted with influenza A,acute copd exacerbation  Placed on BiPAP    Interval History:     Breathing stable on home o2 of  4L.  Did not use BiPAP overnight.  Still feels tired and constipated     Hasn't had a BM yet   Passing gas     Scheduled Meds:   predniSONE  30 mg Oral Daily    LORazepam  2 mg Oral Once    sodium chloride flush  5-40 mL IntraVENous 2 times per day    enoxaparin  40 mg SubCUTAneous Daily    guaiFENesin  600 mg Oral BID    insulin lispro  0-4 Units SubCUTAneous 4x Daily AC & HS    nicotine  1 patch TransDERmal Daily    gabapentin  800 mg Oral 4x Daily    dilTIAZem  180 mg Oral Daily    allopurinol  300 mg Oral Daily    vitamin D3  5,000 Units Oral Daily    furosemide  20 mg Oral Daily    folic acid  1,000 mcg Oral Daily    ferrous sulfate  325 mg Oral BID WC    insulin glargine  15 Units SubCUTAneous Nightly    DULoxetine  60 mg Oral Daily    atorvastatin  40 mg Oral Daily    metoprolol succinate  25 mg Oral Daily    budesonide-formoterol  2 puff Inhalation BID RT    And    tiotropium  2 puff Inhalation Daily RT       Continuous Infusions:   sodium chloride      dextrose         PRN Meds:  bisacodyl, albuterol sulfate HFA, sennosides-docusate sodium, sodium chloride flush, sodium chloride, ondansetron **OR** ondansetron, acetaminophen **OR** acetaminophen, benzonatate, albuterol, dextrose bolus **OR** dextrose bolus, glucagon (rDNA), dextrose, potassium chloride **OR** potassium chloride, magnesium sulfate, polyethylene glycol, calcium carbonate, oxyCODONE HCl, glucose           Objective:     Temp  Av.8 °F (36.6 °C)  Min: 97.2 °F (36.2 °C)  Max: 98.4 °F (36.9 °C)  Pulse  Av.3  Min: 107  Max: 119  BP  Min: 133/84  Max: 168/89  SpO2  Av.3 %  Min: 95 %  Max: 98 %  Patient Vitals for the past 4 hrs:   BP Temp Temp src Pulse Resp SpO2   25 0845 (!) 168/89  bronchodilators  Continue azithromycin  Sputum cultures pending.  Switched to PO prednisone     Influenza A  Continue Tamiflu  Mucinex, tessalon for cough  Droplet isolation    Type 2 diabetes with neuropathy.  Continue Lantus insulin sliding scale insulin  Stable.  -continue gabapentin    Hypertension  Continue metoprolol, cardizem and lasix     Peripheral vascular disease  Status post aortoiliac bypass graft  Continue statins  Unclear why he is not on any antiplatelets    Chronic diastolic HF   -no s/s of acute decompensation   -continue lasix     Chronic pain.  -cymbalta, PRN oxycodone.     Gout.  -continue allopurinol.      Hx cirrhosis.  -2/2 ROCKWELL, alcohol.    Constipation - AXR- ileus versus partial SBO  No BM   Dulcolax suppository  senna    Full code  Carb controlled diet  Lovenox for DVT prophylaxis      JAROCHO SWIFT MD 2/4/2025 11:22 AM

## 2025-02-04 NOTE — PROGRESS NOTES
Handoff report and transfer of care given at bedside to Wang RN.  Patient in stable condition, denies needs/concerns at this time.  Call light within reach.

## 2025-02-04 NOTE — PLAN OF CARE
Problem: Chronic Conditions and Co-morbidities  Goal: Patient's chronic conditions and co-morbidity symptoms are monitored and maintained or improved  2/4/2025 1234 by Wang Becker RN  Outcome: Progressing  2/4/2025 0523 by Nurys Valencia RN  Outcome: Progressing     Problem: Discharge Planning  Goal: Discharge to home or other facility with appropriate resources  2/4/2025 0523 by Nurys Valencia RN  Outcome: Progressing     Problem: Safety - Adult  Goal: Free from fall injury  2/4/2025 0523 by Nurys Valencia RN  Outcome: Progressing     Problem: Pain  Goal: Verbalizes/displays adequate comfort level or baseline comfort level  2/4/2025 0523 by Nurys Valencia RN  Outcome: Progressing     Problem: Respiratory - Adult  Goal: Achieves optimal ventilation and oxygenation  2/4/2025 0523 by Nurys Valencia RN  Outcome: Progressing     Problem: Cardiovascular - Adult  Goal: Maintains optimal cardiac output and hemodynamic stability  2/4/2025 1234 by Wang Becker RN  Outcome: Progressing  2/4/2025 0523 by Nurys Valencia RN  Outcome: Progressing  Goal: Absence of cardiac dysrhythmias or at baseline  2/4/2025 1234 by Wang Becker RN  Outcome: Progressing  2/4/2025 0523 by Nurys Valencia RN  Outcome: Progressing     Problem: Skin/Tissue Integrity - Adult  Goal: Skin integrity remains intact  2/4/2025 0523 by Nurys Valencia RN  Outcome: Progressing  Goal: Oral mucous membranes remain intact  2/4/2025 0523 by Nurys Valencia RN  Outcome: Progressing     Problem: Musculoskeletal - Adult  Goal: Return mobility to safest level of function  2/4/2025 0523 by Nurys Valencia RN  Outcome: Progressing  Goal: Maintain proper alignment of affected body part  2/4/2025 0523 by Nurys Valencia RN  Outcome: Progressing  Goal: Return ADL status to a safe level of function  2/4/2025 0523 by Nurys Valencia RN  Outcome: Progressing     Problem: Gastrointestinal - Adult  Goal: Maintains or returns to baseline bowel function  2/4/2025

## 2025-02-04 NOTE — CONSULTS
Department of General Surgery Consult    PATIENT NAME: Sandor Early   YOB: 1955    ADMISSION DATE: 1/30/2025  5:28 PM      TODAY'S DATE: 2/4/2025    Reason for Consult: Small bowel obstruction    Chief Complaint: Abdominal pain    Historian: Patient    Requesting Practitioner: Dr. SHAH    HISTORY OF PRESENT ILLNESS:              The patient is a 69 y.o. male who presents with hypoxia and shortness of breath from influenza A.  He states that since he has been here the past few days he has had abdominal pain.  He feels like he has gotten more distended.  He has been passing a little bit of gas, but he has not had a bowel movement.  He has some nausea but denies vomiting.    Past Medical History:        Diagnosis Date    Bladder outlet obstruction 03/05/2017    Carpal tunnel syndrome of left wrist 04/02/2013    Cellulitis of right lower extremity 03/27/2023    Cellulitis of right upper extremity 03/01/2023    w/ Group A Strep bacteremia    COPD exacerbation (HCC) 03/02/2023    Cough syncope 01/10/2023    Diabetic ketoacidosis without coma associated with type 2 diabetes mellitus (HCC) 02/18/2018    GI bleed 05/08/2022    Gout 02/01/2013    Hilar adenopathy     Iron deficiency anemia     Malignant neoplasm of urinary bladder (HCC) 02/09/2016    Multiple duodenal ulcers     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) 01/24/2020    Ulnar nerve entrapment 02/09/2016    Uncontrolled hypertension 11/12/2019       Past Surgical History:        Procedure Laterality Date    ANKLE FRACTURE SURGERY Left 2/12/2024    OPEN REDUCTION INTERNAL FIXATION OF LEFT ANKLE FRACTURE DISLOCATION WITH SYNDESMOSIS REPAIR performed by Sudeep Ruth MD at [facility] OR    AORTO-ILIAC BYPASS GRAFT N/A 08/24/2020    AORTOBIFEMORAL BYPASS GRAFT performed by Sam PRIDE

## 2025-02-04 NOTE — PROGRESS NOTES
[facility]                                 Pulmonary/Critical care  Consult /Progress Note :                                                                  CC COPD and flu and SOB   Patient is being seen at the request of Dr Richey  for a consultation for Acute hypoxic ,hypercapnic resp failure    Subjective:  Still c/o constipation    PHYSICAL EXAM:  Vitals:    02/04/25 1600   BP: (!) 185/91   Pulse: (!) 121   Resp: 16   Temp: 97.5 °F (36.4 °C)   SpO2: 100%     Gen: No distress.   Eyes: PERRL. No sclera icterus. No conjunctival injection.   ENT: No discharge. Pharynx clear.   Neck: Trachea midline. No obvious mass.    Resp:Rhonchi   CV: Regular rate. Regular rhythm. No murmur or rub. No edema. Peripheral pulses are 2+.  Capillary refill is less than 3 seconds.  GI: Non-tender. Non-distended. No hernia.   Skin: Warm and dry. No nodule on exposed extremities.   Lymph: No cervical LAD. No supraclavicular LAD.   M/S: No cyanosis. No joint deformity. No clubbing.   Neuro: Awake. Alert. Moves all four extremities.   Psych: Oriented x 3. No anxiety.     LABS:  CBC:   Recent Labs     02/02/25  0443   WBC 13.5*   HGB 14.8   HCT 43.9   MCV 92.0        BMP:   Recent Labs     02/02/25  0443 02/04/25  1421   * 139   K 4.3 4.4   CL 91* 93*   CO2 32 36*   BUN 39* 67*   CREATININE 1.0 1.1     LIVER PROFILE:   No results for input(s): \"AST\", \"ALT\", \"LIPASE\", \"AMYLASE\", \"BILIDIR\", \"BILITOT\", \"ALKPHOS\" in the last 72 hours.    Invalid input(s): \"ALB\"    Flu +     Imaging:  Chest imaging was reviewed by me and showed No acute Process    ASSESSMENT:  Acute COPD exacerbation  Hypoxia  Flu A +   Tobacco abuse  Ileus      PLAN:  Supplemental oxygen to maintain SaO2 >92%; wean as tolerated    Completed Tamiflu and zpak  Tobacco cessation counseling   Bronchodilators  Completed 5 days Prednisone   Incentive spirmetery and flutter valve   I

## 2025-02-04 NOTE — PROGRESS NOTES

## 2025-02-04 NOTE — PROGRESS NOTES
Pt resting as manifested by eyes closed in dark room. Respirations even and easy. Call light and bedside table in reach. Bed in low locked position. Denies any needs at this time.

## 2025-02-04 NOTE — PROGRESS NOTES
[facility]   COPD PROGRAM      NAME:  Sandor Early  AGE: 69 y.o.   GENDER: male  : 1955  TODAY'S DATE:  2025    Subjective:     VISIT TYPE: Education    ADMIT DATE: 2025    PAST MEDICAL HISTORY:      Diagnosis Date    Bladder outlet obstruction 2017    Carpal tunnel syndrome of left wrist 2013    Cellulitis of right lower extremity 2023    Cellulitis of right upper extremity 2023    w/ Group A Strep bacteremia    COPD exacerbation (HCC) 2023    Cough syncope 01/10/2023    Diabetic ketoacidosis without coma associated with type 2 diabetes mellitus (HCC) 2018    GI bleed 2022    Gout 2013    Hilar adenopathy     Iron deficiency anemia     Malignant neoplasm of urinary bladder (HCC) 2016    Multiple duodenal ulcers     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 Authorizing Provider   oseltamivir (TAMIFLU) 75 MG capsule Take 1 capsule by mouth 2 times daily for 5 days 25 Yes Hillary Jalloh MD   dilTIAZem (CARDIZEM) 120 MG tablet Take 1.5 tablets by mouth daily   Yes ProviderNavjot MD   metoprolol succinate (TOPROL XL) 50 MG extended release tablet TAKE ONE (1) TABLET BY MOUTH IN MORNING 25  Yes Juliet Mayorga MD   albuterol sulfate HFA (PROVENTIL;VENTOLIN;PROAIR) 108 (90 Base) MCG/ACT inhaler INHALE TWO (2) PUFFS EVERY SIX (6) HOURS AS NEEDED FOR WHEEZING 25  Yes Juliet Mayorga MD   atorvastatin (LIPITOR) 40 MG tablet TAKE ONE (1) TABLET BY MOUTH DAILY  Patient taking differently: Take 1 tablet by mouth daily 25  Yes Juliet Mayorga

## 2025-02-04 NOTE — PLAN OF CARE
Problem: Chronic Conditions and Co-morbidities  Goal: Patient's chronic conditions and co-morbidity symptoms are monitored and maintained or improved  2/4/2025 1243 by Wang Becker RN  Outcome: Progressing  2/4/2025 1234 by Wang Becker RN  Outcome: Progressing  2/4/2025 0523 by Nurys Valencia RN  Outcome: Progressing     Problem: Discharge Planning  Goal: Discharge to home or other facility with appropriate resources  2/4/2025 0523 by Nurys Valencia RN  Outcome: Progressing     Problem: Safety - Adult  Goal: Free from fall injury  2/4/2025 0523 by Nurys Valencia RN  Outcome: Progressing     Problem: Pain  Goal: Verbalizes/displays adequate comfort level or baseline comfort level  2/4/2025 0523 by Nurys Valencia RN  Outcome: Progressing     Problem: Respiratory - Adult  Goal: Achieves optimal ventilation and oxygenation  2/4/2025 0523 by Nurys Valencia RN  Outcome: Progressing     Problem: Cardiovascular - Adult  Goal: Maintains optimal cardiac output and hemodynamic stability  2/4/2025 1234 by Wang Becker RN  Outcome: Progressing  2/4/2025 0523 by Nurys Valencia RN  Outcome: Progressing  Goal: Absence of cardiac dysrhythmias or at baseline  2/4/2025 1243 by Wang Becker RN  Outcome: Progressing  2/4/2025 1234 by Wang Becker RN  Outcome: Progressing  2/4/2025 0523 by Nurys Valencia RN  Outcome: Progressing     Problem: Skin/Tissue Integrity - Adult  Goal: Skin integrity remains intact  2/4/2025 0523 by Nurys Valencia RN  Outcome: Progressing  Goal: Oral mucous membranes remain intact  2/4/2025 1243 by Wang Becker RN  Outcome: Progressing  2/4/2025 0523 by Nurys Valencia RN  Outcome: Progressing     Problem: Musculoskeletal - Adult  Goal: Return mobility to safest level of function  2/4/2025 0523 by Nurys Valencia RN  Outcome: Progressing  Goal: Maintain proper alignment of affected body part  2/4/2025 0523 by Nurys Valencia RN  Outcome: Progressing  Goal: Return ADL status to a

## 2025-02-04 NOTE — PROGRESS NOTES
Pt requesting medication for heartburn, PRN  medication given, see MAR. SR up x2, Call light and bedside table in easy reach.  Denies any other needs at this time.

## 2025-02-04 NOTE — PLAN OF CARE
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     Weights Reviewed: Yes   Admission weight: 91.6 kg (201 lb 15.1 oz)   Wt Readings from Last 3 Encounters:   02/03/25 87 kg (191 lb 12.8 oz)   01/28/25 91.6 kg (202 lb)   01/04/25 99.3 kg (219 lb)     Intake & Output Reviewed: Yes     Intake/Output Summary (Last 24 hours) at 2/4/2025 1235  Last data filed at 2/4/2025 0753  Gross per 24 hour   Intake 180 ml   Output 1050 ml   Net -870 ml       ECHOCARDIOGRAM Reviewed: Yes   Patient's Ejection Fraction (EF) is greater than 40%     Medications Reviewed: Yes   SCHEDULED HOSPITAL MEDICATIONS:   predniSONE  30 mg Oral Daily    LORazepam  2 mg Oral Once    sodium chloride flush  5-40 mL IntraVENous 2 times per day    enoxaparin  40 mg SubCUTAneous Daily    guaiFENesin  600 mg Oral BID    insulin lispro  0-4 Units SubCUTAneous 4x Daily AC & HS    gabapentin  800 mg Oral 4x Daily    dilTIAZem  180 mg Oral Daily    allopurinol  300 mg Oral Daily    vitamin D3  5,000 Units Oral Daily    furosemide  20 mg Oral Daily    folic acid  1,000 mcg Oral Daily    ferrous sulfate  325 mg Oral BID WC    insulin glargine  15 Units SubCUTAneous Nightly    DULoxetine  60 mg Oral Daily    atorvastatin  40 mg Oral Daily    metoprolol succinate  25 mg Oral Daily    budesonide-formoterol  2 puff Inhalation BID RT    And

## 2025-02-04 NOTE — PLAN OF CARE
Problem: Chronic Conditions and Co-morbidities  Goal: Patient's chronic conditions and co-morbidity symptoms are monitored and maintained or improved  Outcome: Progressing     Problem: Discharge Planning  Goal: Discharge to home or other facility with appropriate resources  Outcome: Progressing     Problem: Safety - Adult  Goal: Free from fall injury  Outcome: Progressing     Problem: Pain  Goal: Verbalizes/displays adequate comfort level or baseline comfort level  Outcome: Progressing     Problem: Respiratory - Adult  Goal: Achieves optimal ventilation and oxygenation  Outcome: Progressing     Problem: Cardiovascular - Adult  Goal: Maintains optimal cardiac output and hemodynamic stability  Outcome: Progressing  Goal: Absence of cardiac dysrhythmias or at baseline  Outcome: Progressing     Problem: Skin/Tissue Integrity - Adult  Goal: Skin integrity remains intact  Outcome: Progressing  Goal: Oral mucous membranes remain intact  Outcome: Progressing     Problem: Musculoskeletal - Adult  Goal: Return mobility to safest level of function  Outcome: Progressing  Goal: Maintain proper alignment of affected body part  Outcome: Progressing  Goal: Return ADL status to a safe level of function  Outcome: Progressing     Problem: Gastrointestinal - Adult  Goal: Maintains or returns to baseline bowel function  Outcome: Progressing  Goal: Maintains adequate nutritional intake  Outcome: Progressing     Problem: Infection - Adult  Goal: Absence of infection at discharge  Outcome: Progressing     Problem: Metabolic/Fluid and Electrolytes - Adult  Goal: Electrolytes maintained within normal limits  Outcome: Progressing  Goal: Glucose maintained within prescribed range  Outcome: Progressing

## 2025-02-04 NOTE — PROGRESS NOTES
Shift assessment complete; see flow sheet.  Scheduled medications administered; See MAR.  IV flushed without difficulty. O2 4 LPM nc in place. Pt denies pain at this time. Pt denies any needs at this time. Pt educated on use of call light and to call out with needs, verbalized understanding, bed in low locked position for pt safety

## 2025-02-05 ENCOUNTER — APPOINTMENT (OUTPATIENT)
Dept: GENERAL RADIOLOGY | Age: 70
DRG: 207 | End: 2025-02-05
Payer: MEDICARE

## 2025-02-05 PROBLEM — K56.609 SBO (SMALL BOWEL OBSTRUCTION) (HCC): Status: ACTIVE | Noted: 2025-02-05

## 2025-02-05 LAB
ALBUMIN SERPL-MCNC: 3.2 G/DL (ref 3.4–5)
ALBUMIN/GLOB SERPL: 1 {RATIO} (ref 1.1–2.2)
ALP SERPL-CCNC: 76 U/L (ref 40–129)
ALT SERPL-CCNC: 15 U/L (ref 10–40)
ANION GAP SERPL CALCULATED.3IONS-SCNC: 8 MMOL/L (ref 3–16)
AST SERPL-CCNC: 41 U/L (ref 15–37)
BASOPHILS # BLD: 0 K/UL (ref 0–0.2)
BASOPHILS NFR BLD: 0 %
BILIRUB SERPL-MCNC: <0.2 MG/DL (ref 0–1)
BUN SERPL-MCNC: 45 MG/DL (ref 7–20)
CALCIUM SERPL-MCNC: 9.7 MG/DL (ref 8.3–10.6)
CHLORIDE SERPL-SCNC: 99 MMOL/L (ref 99–110)
CO2 SERPL-SCNC: 34 MMOL/L (ref 21–32)
CREAT SERPL-MCNC: 0.9 MG/DL (ref 0.8–1.3)
DACRYOCYTES BLD QL SMEAR: ABNORMAL
DEPRECATED RDW RBC AUTO: 15.1 % (ref 12.4–15.4)
EOSINOPHIL # BLD: 0 K/UL (ref 0–0.6)
EOSINOPHIL NFR BLD: 0 %
GFR SERPLBLD CREATININE-BSD FMLA CKD-EPI: >90 ML/MIN/{1.73_M2}
GLUCOSE BLD-MCNC: 142 MG/DL (ref 70–99)
GLUCOSE BLD-MCNC: 148 MG/DL (ref 70–99)
GLUCOSE BLD-MCNC: 154 MG/DL (ref 70–99)
GLUCOSE BLD-MCNC: 162 MG/DL (ref 70–99)
GLUCOSE SERPL-MCNC: 143 MG/DL (ref 70–99)
HCT VFR BLD AUTO: 30.4 % (ref 40.5–52.5)
HGB BLD-MCNC: 10.4 G/DL (ref 13.5–17.5)
LYMPHOCYTES # BLD: 1 K/UL (ref 1–5.1)
LYMPHOCYTES NFR BLD: 9 %
MCH RBC QN AUTO: 31.6 PG (ref 26–34)
MCHC RBC AUTO-ENTMCNC: 34.1 G/DL (ref 31–36)
MCV RBC AUTO: 92.6 FL (ref 80–100)
MONOCYTES # BLD: 0.4 K/UL (ref 0–1.3)
MONOCYTES NFR BLD: 4 %
NEUTROPHILS # BLD: 9.4 K/UL (ref 1.7–7.7)
NEUTROPHILS NFR BLD: 87 %
NRBC BLD-RTO: 2 /100 WBC
PERFORMED ON: ABNORMAL
PLATELET # BLD AUTO: 258 K/UL (ref 135–450)
PLATELET BLD QL SMEAR: ADEQUATE
PMV BLD AUTO: 8.2 FL (ref 5–10.5)
POIKILOCYTOSIS BLD QL SMEAR: ABNORMAL
POTASSIUM SERPL-SCNC: 4.3 MMOL/L (ref 3.5–5.1)
PROT SERPL-MCNC: 6.3 G/DL (ref 6.4–8.2)
RBC # BLD AUTO: 3.28 M/UL (ref 4.2–5.9)
REASON FOR REJECTION: NORMAL
REJECTED TEST: NORMAL
SLIDE REVIEW: ABNORMAL
SODIUM SERPL-SCNC: 141 MMOL/L (ref 136–145)
WBC # BLD AUTO: 10.8 K/UL (ref 4–11)

## 2025-02-05 PROCEDURE — 6370000000 HC RX 637 (ALT 250 FOR IP): Performed by: INTERNAL MEDICINE

## 2025-02-05 PROCEDURE — 36415 COLL VENOUS BLD VENIPUNCTURE: CPT

## 2025-02-05 PROCEDURE — 99232 SBSQ HOSP IP/OBS MODERATE 35: CPT | Performed by: SURGERY

## 2025-02-05 PROCEDURE — 97162 PT EVAL MOD COMPLEX 30 MIN: CPT

## 2025-02-05 PROCEDURE — 6360000002 HC RX W HCPCS: Performed by: INTERNAL MEDICINE

## 2025-02-05 PROCEDURE — 2580000003 HC RX 258: Performed by: INTERNAL MEDICINE

## 2025-02-05 PROCEDURE — 94640 AIRWAY INHALATION TREATMENT: CPT

## 2025-02-05 PROCEDURE — 99233 SBSQ HOSP IP/OBS HIGH 50: CPT | Performed by: INTERNAL MEDICINE

## 2025-02-05 PROCEDURE — 6360000002 HC RX W HCPCS

## 2025-02-05 PROCEDURE — APPSS15 APP SPLIT SHARED TIME 0-15 MINUTES

## 2025-02-05 PROCEDURE — 80053 COMPREHEN METABOLIC PANEL: CPT

## 2025-02-05 PROCEDURE — 94761 N-INVAS EAR/PLS OXIMETRY MLT: CPT

## 2025-02-05 PROCEDURE — 74019 RADEX ABDOMEN 2 VIEWS: CPT

## 2025-02-05 PROCEDURE — 97530 THERAPEUTIC ACTIVITIES: CPT

## 2025-02-05 PROCEDURE — 1200000000 HC SEMI PRIVATE

## 2025-02-05 PROCEDURE — 85025 COMPLETE CBC W/AUTO DIFF WBC: CPT

## 2025-02-05 PROCEDURE — 2700000000 HC OXYGEN THERAPY PER DAY

## 2025-02-05 PROCEDURE — 97166 OT EVAL MOD COMPLEX 45 MIN: CPT

## 2025-02-05 PROCEDURE — 6370000000 HC RX 637 (ALT 250 FOR IP)

## 2025-02-05 RX ORDER — BISACODYL 10 MG
20 SUPPOSITORY, RECTAL RECTAL ONCE
Status: COMPLETED | OUTPATIENT
Start: 2025-02-05 | End: 2025-02-05

## 2025-02-05 RX ADMIN — GUAIFENESIN 600 MG: 600 TABLET, EXTENDED RELEASE ORAL at 23:10

## 2025-02-05 RX ADMIN — MORPHINE SULFATE 2 MG: 2 INJECTION, SOLUTION INTRAMUSCULAR; INTRAVENOUS at 23:29

## 2025-02-05 RX ADMIN — ENOXAPARIN SODIUM 40 MG: 100 INJECTION SUBCUTANEOUS at 08:46

## 2025-02-05 RX ADMIN — DULOXETINE 60 MG: 60 CAPSULE, DELAYED RELEASE ORAL at 08:46

## 2025-02-05 RX ADMIN — OXYCODONE HYDROCHLORIDE 10 MG: 5 TABLET ORAL at 08:46

## 2025-02-05 RX ADMIN — BUDESONIDE AND FORMOTEROL FUMARATE DIHYDRATE 2 PUFF: 160; 4.5 AEROSOL RESPIRATORY (INHALATION) at 20:40

## 2025-02-05 RX ADMIN — TIOTROPIUM BROMIDE INHALATION SPRAY 2 PUFF: 3.12 SPRAY, METERED RESPIRATORY (INHALATION) at 08:49

## 2025-02-05 RX ADMIN — GUAIFENESIN 600 MG: 600 TABLET, EXTENDED RELEASE ORAL at 08:46

## 2025-02-05 RX ADMIN — BISACODYL 20 MG: 10 SUPPOSITORY RECTAL at 09:48

## 2025-02-05 RX ADMIN — FUROSEMIDE 20 MG: 20 TABLET ORAL at 08:46

## 2025-02-05 RX ADMIN — MORPHINE SULFATE 2 MG: 2 INJECTION, SOLUTION INTRAMUSCULAR; INTRAVENOUS at 09:48

## 2025-02-05 RX ADMIN — ATORVASTATIN CALCIUM 40 MG: 40 TABLET, FILM COATED ORAL at 08:46

## 2025-02-05 RX ADMIN — METOPROLOL SUCCINATE 50 MG: 50 TABLET, EXTENDED RELEASE ORAL at 08:46

## 2025-02-05 RX ADMIN — ONDANSETRON 4 MG: 2 INJECTION INTRAMUSCULAR; INTRAVENOUS at 17:20

## 2025-02-05 RX ADMIN — SODIUM CHLORIDE: 9 INJECTION, SOLUTION INTRAVENOUS at 23:01

## 2025-02-05 RX ADMIN — ALLOPURINOL 300 MG: 300 TABLET ORAL at 08:46

## 2025-02-05 RX ADMIN — ONDANSETRON 4 MG: 2 INJECTION INTRAMUSCULAR; INTRAVENOUS at 05:55

## 2025-02-05 RX ADMIN — METOPROLOL SUCCINATE 50 MG: 50 TABLET, EXTENDED RELEASE ORAL at 23:10

## 2025-02-05 RX ADMIN — DILTIAZEM HYDROCHLORIDE 180 MG: 180 CAPSULE, COATED, EXTENDED RELEASE ORAL at 08:46

## 2025-02-05 RX ADMIN — FERROUS SULFATE TAB 325 MG (65 MG ELEMENTAL FE) 325 MG: 325 (65 FE) TAB at 17:12

## 2025-02-05 RX ADMIN — Medication 5000 UNITS: at 08:46

## 2025-02-05 RX ADMIN — BUDESONIDE AND FORMOTEROL FUMARATE DIHYDRATE 2 PUFF: 160; 4.5 AEROSOL RESPIRATORY (INHALATION) at 08:49

## 2025-02-05 RX ADMIN — FERROUS SULFATE TAB 325 MG (65 MG ELEMENTAL FE) 325 MG: 325 (65 FE) TAB at 08:45

## 2025-02-05 RX ADMIN — ALBUTEROL SULFATE 2 PUFF: 90 AEROSOL, METERED RESPIRATORY (INHALATION) at 08:50

## 2025-02-05 RX ADMIN — GABAPENTIN 800 MG: 400 CAPSULE ORAL at 13:20

## 2025-02-05 RX ADMIN — MORPHINE SULFATE 2 MG: 2 INJECTION, SOLUTION INTRAMUSCULAR; INTRAVENOUS at 13:20

## 2025-02-05 RX ADMIN — GABAPENTIN 800 MG: 400 CAPSULE ORAL at 23:10

## 2025-02-05 RX ADMIN — GABAPENTIN 800 MG: 400 CAPSULE ORAL at 08:46

## 2025-02-05 RX ADMIN — GABAPENTIN 800 MG: 400 CAPSULE ORAL at 17:12

## 2025-02-05 RX ADMIN — INSULIN GLARGINE 15 UNITS: 100 INJECTION, SOLUTION SUBCUTANEOUS at 23:10

## 2025-02-05 RX ADMIN — FOLIC ACID 1000 MCG: 1 TABLET ORAL at 08:46

## 2025-02-05 ASSESSMENT — PAIN - FUNCTIONAL ASSESSMENT
PAIN_FUNCTIONAL_ASSESSMENT: ACTIVITIES ARE NOT PREVENTED

## 2025-02-05 ASSESSMENT — PAIN DESCRIPTION - LOCATION
LOCATION: ABDOMEN

## 2025-02-05 ASSESSMENT — PAIN SCALES - GENERAL
PAINLEVEL_OUTOF10: 6
PAINLEVEL_OUTOF10: 10
PAINLEVEL_OUTOF10: 7
PAINLEVEL_OUTOF10: 6
PAINLEVEL_OUTOF10: 7
PAINLEVEL_OUTOF10: 8
PAINLEVEL_OUTOF10: 7

## 2025-02-05 ASSESSMENT — PAIN DESCRIPTION - PAIN TYPE
TYPE: ACUTE PAIN

## 2025-02-05 ASSESSMENT — PAIN DESCRIPTION - ORIENTATION
ORIENTATION: LOWER

## 2025-02-05 ASSESSMENT — PAIN DESCRIPTION - DESCRIPTORS
DESCRIPTORS: SHARP
DESCRIPTORS: ACHING
DESCRIPTORS: SHARP
DESCRIPTORS: ACHING

## 2025-02-05 ASSESSMENT — PAIN DESCRIPTION - ONSET
ONSET: ON-GOING

## 2025-02-05 ASSESSMENT — PAIN DESCRIPTION - DIRECTION
RADIATING_TOWARDS: DENIES

## 2025-02-05 ASSESSMENT — PAIN SCALES - WONG BAKER: WONGBAKER_NUMERICALRESPONSE: NO HURT

## 2025-02-05 ASSESSMENT — PAIN DESCRIPTION - FREQUENCY
FREQUENCY: CONTINUOUS

## 2025-02-05 NOTE — PROGRESS NOTES
Inpatient Occupational Therapy Evaluation & Treatment    Unit: [facility]  Date:  2/5/2025  Patient Name:    Sandor Early  Admitting diagnosis:  Hypomagnesemia [E83.42]  Influenza A [J10.1]  COPD exacerbation (HCC) [J44.1]  Respiratory failure with hypoxia [J96.91]  Alcohol use disorder [F10.90]  Acute on chronic respiratory failure with hypoxia and hypercapnia [J96.21, J96.22]  Acute respiratory failure [J96.00]  Admit Date:  1/30/2025  Precautions/Restrictions/WB Status/ Lines/ Wounds/ Oxygen: Fall risk, Bed/chair alarm, Lines (IV, Supplemental O2 (4L), and NG tube), Telemetry, and Isolation Precautions: Contact and Droplet    Pt seen for cotreatment this date due to patient safety, acute illness/injury, and limited functional status information    Treatment Time:  2701-0864  Treatment Number:  1  Timed Code Treatment Minutes: 15 minutes  Total Treatment Minutes:  25  minutes    Patient Goals for Therapy: \"go to the bathroom\"          Discharge Recommendations: SNF pending progress  DME needs for discharge: Defer to facility       Therapy recommendations for staff:   Assist of 1 for stand-pivot transfers with gait belt to/from BSC  to/from chair    History of Present Illness: per H Candace MCKEON H&P 2/5/25:  \"Sandor Early is a 69 y.o. male with a pmhx of dCHF, COPD, chronic respiratory failure, DMII, HTN, ROCKWELL cirrhosis, PAD s/p aortofemoral bypass, hx of bladder cancer and chronic pain who presented with hypoxia and SOB from Flu A. Had increasing abdominal pain and distension following admission.      Abdominal imaging has shown SBO vs ileus. On CT, the transition from dilated SB to decompressed SB occurs at the distal ileum in the RLQ but the transition is more gradual by my review.      SBO 2/2 adhesive disease from prior surgery vs paralytic ileus 2/2 below  Acute on chronic respiratory failure and COPDae 2/2 influenza A     AXR today with persistent bowel dilation. Dense stool appreciated in the right colon

## 2025-02-05 NOTE — PROGRESS NOTES
General Surgery  Daily Progress Note    Pt Name: Sandor Early  Medical Record Number: [id number]  Date of Birth 1955   Today's Date: 2/5/2025  Admit date: 1/30/2025  LOS: Day 6    SUBJECTIVE  Abdomen feels about the same. Does report passing some gas and small stool. No recorded Bms.       OBJECTIVE  Vitals:    02/04/25 2152 02/05/25 0305 02/05/25 0837 02/05/25 0850   BP:  (!) 163/99 (!) 156/91    Pulse:  (!) 109 92    Resp: 16 16 20    Temp:  97.3 °F (36.3 °C) 98.7 °F (37.1 °C)    TempSrc:  Oral Oral    SpO2:  98% 98% 98%   Weight:       Height:           Gen: No distress. Alert.   Resp: Normal rate. Easy and unlabored. No accessory muscle use.   CV: Regular rate. Regular rhythm.   GI: +Diffuse TTP. +Softly distended.  +Sluggish but present bowel sounds.  Skin: Warm and dry. No nodule or rash on exposed extremities.       I/O last 3 completed shifts:  In: -   Out: 2850 [Urine:1850; Emesis/NG output:1000]  I/O this shift:  In: -   Out: 300 [Emesis/NG output:300]    LABS  CBC: No results for input(s): \"WBC\", \"HGB\", \"HCT\", \"MCV\", \"PLT\" in the last 72 hours.  BMP:   Recent Labs     02/04/25  1421      K 4.4   CL 93*   CO2 36*   BUN 67*   CREATININE 1.1     LIVER PROFILE: No results for input(s): \"AST\", \"ALT\", \"LIPASE\", \"AMYLASE\", \"BILIDIR\", \"BILITOT\", \"ALKPHOS\" in the last 72 hours.    Invalid input(s): \"ALB\"  PT/INR: No results for input(s): \"PROTIME\", \"INR\" in the last 72 hours.  APTT: No results for input(s): \"APTT\" in the last 72 hours.  UA:  Recent Labs     02/04/25  1810   COLORU Yellow   PHUR 6.0   WBCUA 0-2   RBCUA 0-2   CLARITYU Clear   LEUKOCYTESUR Negative   UROBILINOGEN 0.2   BILIRUBINUR Negative   BLOODU TRACE-INTACT*   GLUCOSEU Negative         IMAGING  XR ABDOMEN (2 VIEWS)   Final Result   1. Unchanged dilated loops of small bowel.         XR ABDOMEN FOR NG/OG/NE TUBE PLACEMENT   Final Result   NG tube in place, with tip within the mid to upper body of the stomach.         CT ABDOMEN

## 2025-02-05 NOTE — PROGRESS NOTES
Admit: 2025    Name:  Sandor Early  Room:  [facility]0220-  MRN:    [id number]     Daily Progress Note for 2025     Admitted with influenza A,acute copd exacerbation  Placed on BiPAP    Interval History:     Breathing stable on home o2 of  4L.  Did not use BiPAP overnight.  Still feels tired and constipated     AXR And CT shows possible partial SBO  Surgery consulted   NG placed   C/o abd pain   Passing some gas. No BM    Scheduled Meds:   metoprolol succinate  50 mg Oral BID    LORazepam  2 mg Oral Once    sodium chloride flush  5-40 mL IntraVENous 2 times per day    enoxaparin  40 mg SubCUTAneous Daily    guaiFENesin  600 mg Oral BID    insulin lispro  0-4 Units SubCUTAneous 4x Daily AC & HS    gabapentin  800 mg Oral 4x Daily    dilTIAZem  180 mg Oral Daily    allopurinol  300 mg Oral Daily    vitamin D3  5,000 Units Oral Daily    furosemide  20 mg Oral Daily    folic acid  1,000 mcg Oral Daily    ferrous sulfate  325 mg Oral BID WC    insulin glargine  15 Units SubCUTAneous Nightly    DULoxetine  60 mg Oral Daily    atorvastatin  40 mg Oral Daily    budesonide-formoterol  2 puff Inhalation BID RT    And    tiotropium  2 puff Inhalation Daily RT       Continuous Infusions:   sodium chloride 75 mL/hr at 25 1610    sodium chloride      dextrose         PRN Meds:  bisacodyl, morphine, phenol, albuterol sulfate HFA, sennosides-docusate sodium, sodium chloride flush, sodium chloride, ondansetron **OR** ondansetron, acetaminophen **OR** acetaminophen, benzonatate, albuterol, dextrose bolus **OR** dextrose bolus, glucagon (rDNA), dextrose, potassium chloride **OR** potassium chloride, magnesium sulfate, polyethylene glycol, calcium carbonate, oxyCODONE HCl, glucose           Objective:     Temp  Av.6 °F (36.4 °C)  Min: 97.2 °F (36.2 °C)  Max: 98.7 °F (37.1 °C)  Pulse  Av.4  Min: 92  Max: 121  BP  Min: 153/80  Max: 185/91  SpO2  Av.6 %  Min: 97 %  Max: 100 %  Patient Vitals for the past 4

## 2025-02-05 NOTE — PLAN OF CARE
Problem: Chronic Conditions and Co-morbidities  Goal: Patient's chronic conditions and co-morbidity symptoms are monitored and maintained or improved  Outcome: Progressing     Problem: Discharge Planning  Goal: Discharge to home or other facility with appropriate resources  Outcome: Progressing     Problem: Safety - Adult  Goal: Free from fall injury  Outcome: Progressing     Problem: Pain  Goal: Verbalizes/displays adequate comfort level or baseline comfort level  Outcome: Progressing  Flowsheets  Taken 2/5/2025 1316  Verbalizes/displays adequate comfort level or baseline comfort level:   Encourage patient to monitor pain and request assistance   Assess pain using appropriate pain scale  Taken 2/5/2025 0837  Verbalizes/displays adequate comfort level or baseline comfort level:   Encourage patient to monitor pain and request assistance   Assess pain using appropriate pain scale     Problem: Respiratory - Adult  Goal: Achieves optimal ventilation and oxygenation  Outcome: Progressing     Problem: Skin/Tissue Integrity - Adult  Goal: Skin integrity remains intact  Recent Flowsheet Documentation  Taken 2/5/2025 6372 by Amanda Núñez RN  Skin Integrity Remains Intact: Monitor for areas of redness and/or skin breakdown

## 2025-02-05 NOTE — PROGRESS NOTES
Shift assessment complete. VSS. Patient A/OX4. Pt c/o pain 7/10 in abd. PRN Oxy given. Scheduled meds given, see MAR. NGT clamped for med admin. Pt denies needs at this time. Call light within reach. Bed alarm on.

## 2025-02-05 NOTE — PROGRESS NOTES
Inpatient Physical Therapy Evaluation & Treatment    Unit: [facility]  Date:  2/5/2025  Patient Name:    Sandor Early  Admitting diagnosis:  Hypomagnesemia [E83.42]  Influenza A [J10.1]  COPD exacerbation (HCC) [J44.1]  Respiratory failure with hypoxia [J96.91]  Alcohol use disorder [F10.90]  Acute on chronic respiratory failure with hypoxia and hypercapnia [J96.21, J96.22]  Acute respiratory failure [J96.00]  Admit Date:  1/30/2025  Precautions/Restrictions/WB Status/ Lines/ Wounds/ Oxygen: Fall risk, Bed/chair alarm, Lines (IV, Supplemental O2 (4L), and NG tube), Telemetry, and Isolation Precautions: Contact and Droplet    Treatment Time:  13:30-13:50  Treatment Number:  1   Timed Code Treatment Minutes: 10 minutes  Total Treatment Minutes:  20  minutes    Patient Stated Goals for Therapy: \" not stated \"          Discharge Recommendations: SNF; hopeful to upgrade to home if he improves.  DME needs for discharge:  CTA       Therapy recommendation for EMS Transport: can transport by wheelchair    Therapy recommendations for staff:   Assist of 1 for stand-pivot transfers with No AD to/from BSC  to/from chair    History of Present Illness:   Admitted with influenza A,acute copd exacerbation   AXR And CT shows possible partial SBO. General surgery consulted, conservative management at this time.     Preadmission Environment: from OT shagufta 10/24/24:  Pt lives with                                    with family (step son, DIL, and 3 grandkids)  Home environment:                                  mobile home/trailer  Steps to enter first floor:                3 steps to enter and hand rail bilateral  Steps to second floor/basement:   N/A  Laundry:                                         Pt completes  Bathroom:                                      tub/shower unit and comfort height toilet  Pt sleeps in a:                                Flat bed  Equipment owned:                         RW, SPC, and home O2 (4L) continous

## 2025-02-06 ENCOUNTER — APPOINTMENT (OUTPATIENT)
Dept: GENERAL RADIOLOGY | Age: 70
DRG: 207 | End: 2025-02-06
Payer: MEDICARE

## 2025-02-06 LAB
ANION GAP SERPL CALCULATED.3IONS-SCNC: 10 MMOL/L (ref 3–16)
BASE EXCESS BLDV CALC-SCNC: 5.7 MMOL/L (ref -3–3)
BUN SERPL-MCNC: 31 MG/DL (ref 7–20)
CALCIUM SERPL-MCNC: 8.8 MG/DL (ref 8.3–10.6)
CHLORIDE SERPL-SCNC: 104 MMOL/L (ref 99–110)
CO2 BLDV-SCNC: 25 MMOL/L
CO2 SERPL-SCNC: 29 MMOL/L (ref 21–32)
COHGB MFR BLDV: 2.7 % (ref 0–1.5)
CREAT SERPL-MCNC: 0.7 MG/DL (ref 0.8–1.3)
GFR SERPLBLD CREATININE-BSD FMLA CKD-EPI: >90 ML/MIN/{1.73_M2}
GLUCOSE BLD-MCNC: 102 MG/DL (ref 70–99)
GLUCOSE BLD-MCNC: 107 MG/DL (ref 70–99)
GLUCOSE BLD-MCNC: 111 MG/DL (ref 70–99)
GLUCOSE BLD-MCNC: 111 MG/DL (ref 70–99)
GLUCOSE BLD-MCNC: 135 MG/DL (ref 70–99)
GLUCOSE SERPL-MCNC: 94 MG/DL (ref 70–99)
HCO3 BLDV-SCNC: 24.8 MMOL/L (ref 23–29)
METHGB MFR BLDV: 0.1 %
O2 CT VFR BLDV CALC: 15 VOL %
O2 THERAPY: ABNORMAL
PCO2 BLDV: 21 MMHG (ref 40–50)
PERFORMED ON: ABNORMAL
PH BLDV: 7.69 [PH] (ref 7.35–7.45)
PO2 BLDV: 197.1 MMHG (ref 25–40)
POTASSIUM SERPL-SCNC: 4.4 MMOL/L (ref 3.5–5.1)
SAO2 % BLDV: 100 %
SODIUM SERPL-SCNC: 143 MMOL/L (ref 136–145)

## 2025-02-06 PROCEDURE — APPSS15 APP SPLIT SHARED TIME 0-15 MINUTES

## 2025-02-06 PROCEDURE — 6370000000 HC RX 637 (ALT 250 FOR IP): Performed by: INTERNAL MEDICINE

## 2025-02-06 PROCEDURE — 2700000000 HC OXYGEN THERAPY PER DAY

## 2025-02-06 PROCEDURE — 6360000002 HC RX W HCPCS: Performed by: INTERNAL MEDICINE

## 2025-02-06 PROCEDURE — 36415 COLL VENOUS BLD VENIPUNCTURE: CPT

## 2025-02-06 PROCEDURE — 2500000003 HC RX 250 WO HCPCS: Performed by: INTERNAL MEDICINE

## 2025-02-06 PROCEDURE — 94761 N-INVAS EAR/PLS OXIMETRY MLT: CPT

## 2025-02-06 PROCEDURE — 82803 BLOOD GASES ANY COMBINATION: CPT

## 2025-02-06 PROCEDURE — 94640 AIRWAY INHALATION TREATMENT: CPT

## 2025-02-06 PROCEDURE — 99232 SBSQ HOSP IP/OBS MODERATE 35: CPT | Performed by: INTERNAL MEDICINE

## 2025-02-06 PROCEDURE — 80048 BASIC METABOLIC PNL TOTAL CA: CPT

## 2025-02-06 PROCEDURE — 74019 RADEX ABDOMEN 2 VIEWS: CPT

## 2025-02-06 PROCEDURE — 1200000000 HC SEMI PRIVATE

## 2025-02-06 PROCEDURE — 51798 US URINE CAPACITY MEASURE: CPT

## 2025-02-06 PROCEDURE — 99232 SBSQ HOSP IP/OBS MODERATE 35: CPT | Performed by: SURGERY

## 2025-02-06 RX ORDER — ALBUTEROL SULFATE 0.83 MG/ML
2.5 SOLUTION RESPIRATORY (INHALATION)
Status: DISCONTINUED | OUTPATIENT
Start: 2025-02-06 | End: 2025-02-09

## 2025-02-06 RX ADMIN — GUAIFENESIN 600 MG: 600 TABLET, EXTENDED RELEASE ORAL at 09:55

## 2025-02-06 RX ADMIN — METOPROLOL SUCCINATE 50 MG: 50 TABLET, EXTENDED RELEASE ORAL at 09:55

## 2025-02-06 RX ADMIN — DULOXETINE 60 MG: 60 CAPSULE, DELAYED RELEASE ORAL at 09:56

## 2025-02-06 RX ADMIN — PHENOL 1 SPRAY: 1.5 LIQUID ORAL at 21:14

## 2025-02-06 RX ADMIN — GABAPENTIN 800 MG: 400 CAPSULE ORAL at 20:50

## 2025-02-06 RX ADMIN — Medication 5000 UNITS: at 09:55

## 2025-02-06 RX ADMIN — TIOTROPIUM BROMIDE INHALATION SPRAY 2 PUFF: 3.12 SPRAY, METERED RESPIRATORY (INHALATION) at 08:16

## 2025-02-06 RX ADMIN — MORPHINE SULFATE 2 MG: 2 INJECTION, SOLUTION INTRAMUSCULAR; INTRAVENOUS at 04:52

## 2025-02-06 RX ADMIN — ATORVASTATIN CALCIUM 40 MG: 40 TABLET, FILM COATED ORAL at 09:55

## 2025-02-06 RX ADMIN — ALBUTEROL SULFATE 2.5 MG: 2.5 SOLUTION RESPIRATORY (INHALATION) at 14:25

## 2025-02-06 RX ADMIN — MORPHINE SULFATE 2 MG: 2 INJECTION, SOLUTION INTRAMUSCULAR; INTRAVENOUS at 18:16

## 2025-02-06 RX ADMIN — FERROUS SULFATE TAB 325 MG (65 MG ELEMENTAL FE) 325 MG: 325 (65 FE) TAB at 09:55

## 2025-02-06 RX ADMIN — ENOXAPARIN SODIUM 40 MG: 100 INJECTION SUBCUTANEOUS at 09:56

## 2025-02-06 RX ADMIN — SODIUM CHLORIDE, PRESERVATIVE FREE 10 ML: 5 INJECTION INTRAVENOUS at 09:56

## 2025-02-06 RX ADMIN — MORPHINE SULFATE 2 MG: 2 INJECTION, SOLUTION INTRAMUSCULAR; INTRAVENOUS at 11:49

## 2025-02-06 RX ADMIN — ALBUTEROL SULFATE 2 PUFF: 90 AEROSOL, METERED RESPIRATORY (INHALATION) at 08:16

## 2025-02-06 RX ADMIN — BUDESONIDE AND FORMOTEROL FUMARATE DIHYDRATE 2 PUFF: 160; 4.5 AEROSOL RESPIRATORY (INHALATION) at 08:16

## 2025-02-06 RX ADMIN — GUAIFENESIN 600 MG: 600 TABLET, EXTENDED RELEASE ORAL at 20:50

## 2025-02-06 RX ADMIN — INSULIN GLARGINE 15 UNITS: 100 INJECTION, SOLUTION SUBCUTANEOUS at 20:50

## 2025-02-06 RX ADMIN — ONDANSETRON 4 MG: 2 INJECTION INTRAMUSCULAR; INTRAVENOUS at 12:06

## 2025-02-06 RX ADMIN — ONDANSETRON 4 MG: 2 INJECTION INTRAMUSCULAR; INTRAVENOUS at 18:16

## 2025-02-06 RX ADMIN — DILTIAZEM HYDROCHLORIDE 180 MG: 180 CAPSULE, COATED, EXTENDED RELEASE ORAL at 09:55

## 2025-02-06 RX ADMIN — ALBUTEROL SULFATE 2.5 MG: 2.5 SOLUTION RESPIRATORY (INHALATION) at 20:08

## 2025-02-06 RX ADMIN — ALLOPURINOL 300 MG: 300 TABLET ORAL at 09:55

## 2025-02-06 RX ADMIN — GABAPENTIN 800 MG: 400 CAPSULE ORAL at 09:55

## 2025-02-06 RX ADMIN — FOLIC ACID 1000 MCG: 1 TABLET ORAL at 09:55

## 2025-02-06 RX ADMIN — BUDESONIDE AND FORMOTEROL FUMARATE DIHYDRATE 2 PUFF: 160; 4.5 AEROSOL RESPIRATORY (INHALATION) at 20:08

## 2025-02-06 RX ADMIN — METOPROLOL SUCCINATE 50 MG: 50 TABLET, EXTENDED RELEASE ORAL at 20:50

## 2025-02-06 ASSESSMENT — PAIN SCALES - GENERAL
PAINLEVEL_OUTOF10: 0
PAINLEVEL_OUTOF10: 3
PAINLEVEL_OUTOF10: 7

## 2025-02-06 ASSESSMENT — PAIN DESCRIPTION - DESCRIPTORS
DESCRIPTORS: SHARP
DESCRIPTORS: ACHING
DESCRIPTORS: DISCOMFORT

## 2025-02-06 ASSESSMENT — PAIN DESCRIPTION - LOCATION
LOCATION: THROAT
LOCATION: ABDOMEN

## 2025-02-06 ASSESSMENT — PAIN DESCRIPTION - ORIENTATION
ORIENTATION: MID
ORIENTATION: LOWER

## 2025-02-06 ASSESSMENT — PAIN SCALES - WONG BAKER: WONGBAKER_NUMERICALRESPONSE: NO HURT

## 2025-02-06 NOTE — PROGRESS NOTES
Admit: 2025    Name:  Sandor Early  Room:  [address] [place] [address]  MRN:    [id number]     Daily Progress Note for 2025     Admitted with influenza A,acute copd exacerbation  Placed on BiPAP    Interval History:     Breathing stable on home o2 of  4L.  Did not use BiPAP overnight.    Passing some gas   No BM    Scheduled Meds:   metoprolol succinate  50 mg Oral BID    LORazepam  2 mg Oral Once    sodium chloride flush  5-40 mL IntraVENous 2 times per day    enoxaparin  40 mg SubCUTAneous Daily    guaiFENesin  600 mg Oral BID    insulin lispro  0-4 Units SubCUTAneous 4x Daily AC & HS    gabapentin  800 mg Oral 4x Daily    dilTIAZem  180 mg Oral Daily    allopurinol  300 mg Oral Daily    vitamin D3  5,000 Units Oral Daily    [Held by provider] furosemide  20 mg Oral Daily    folic acid  1,000 mcg Oral Daily    ferrous sulfate  325 mg Oral BID WC    insulin glargine  15 Units SubCUTAneous Nightly    DULoxetine  60 mg Oral Daily    atorvastatin  40 mg Oral Daily    budesonide-formoterol  2 puff Inhalation BID RT    And    tiotropium  2 puff Inhalation Daily RT       Continuous Infusions:   sodium chloride 75 mL/hr at 25 2301    sodium chloride      dextrose         PRN Meds:  bisacodyl, morphine, phenol, albuterol sulfate HFA, sennosides-docusate sodium, sodium chloride flush, sodium chloride, ondansetron **OR** ondansetron, acetaminophen **OR** acetaminophen, benzonatate, albuterol, dextrose bolus **OR** dextrose bolus, glucagon (rDNA), dextrose, potassium chloride **OR** potassium chloride, magnesium sulfate, polyethylene glycol, calcium carbonate, oxyCODONE HCl, glucose           Objective:     Temp  Av.7 °F (36.5 °C)  Min: 97.5 °F (36.4 °C)  Max: 97.9 °F (36.6 °C)  Pulse  Av.3  Min: 91  Max: 101  BP  Min: 151/88  Max: 177/71  SpO2  Av.7 %  Min: 96 %  Max: 99 %  Patient Vitals for the past 4 hrs:   BP Temp Temp src Pulse Resp SpO2   25 0827 (!) 177/71 97.7 °F (36.5 °C) Oral 91 18 96 %

## 2025-02-06 NOTE — PROGRESS NOTES
02/06/25 0800   RT Protocol   History Pulmonary Disease 2   Respiratory pattern 0   Breath sounds 2   Cough 0   Indications for Bronchodilator Therapy Decreased or absent breath sounds   Bronchodilator Assessment Score 4

## 2025-02-06 NOTE — PROGRESS NOTES
Bed side report given to CONSTANTINO Jansen. Patient awake in bed, denies needs. Call light within reach. Bed alarm on.

## 2025-02-06 NOTE — PROGRESS NOTES
Patient unable to void, bladder scan shows 627ml. Patient straight cath for a total of  550ml tolerated well. NG tube in place at 60cm, draining black liquid. Resting in bed continues to complain of lower ABD pain, took all PO meds NG clamped for an hour per order, no complaints of N/V. Call light and bedside table within reach.

## 2025-02-06 NOTE — PROGRESS NOTES
02/06/25 1430   RT Protocol   History Pulmonary Disease 2   Respiratory pattern 0   Breath sounds 6   Cough 3   Bronchodilator Assessment Score 11

## 2025-02-06 NOTE — PROGRESS NOTES
Castrejon placed due to multiple episoed of retention. Pt tolerated well. Sterile technique used.

## 2025-02-06 NOTE — PROGRESS NOTES
General Surgery  Daily Progress Note    Pt Name: Sandor Early  Medical Record Number: [id number]  Date of Birth 1955   Today's Date: 2/6/2025  Admit date: 1/30/2025  LOS: Day 7    SUBJECTIVE  Abdomen feels about the same. Does report passing some gas and had BM yesterday after suppository.       OBJECTIVE  Vitals:    02/06/25 0452 02/06/25 0548 02/06/25 0816 02/06/25 0827   BP:    (!) 177/71   Pulse:   99 91   Resp: 18  18 18   Temp:    97.7 °F (36.5 °C)   TempSrc:    Oral   SpO2:   98% 96%   Weight:  87.2 kg (192 lb 3.2 oz)     Height:           Gen: No distress. Alert.   Resp: Normal rate. Easy and unlabored. No accessory muscle use.   CV: Regular rate. Regular rhythm.   GI: +Diffuse TTP. +Softly distended.  +Sluggish but present bowel sounds.  Skin: Warm and dry. No nodule or rash on exposed extremities.       I/O last 3 completed shifts:  In: 0   Out: 3500 [Urine:1400; Emesis/NG output:2100]  No intake/output data recorded.    LABS  CBC:   Recent Labs     02/05/25  0938   WBC 10.8   HGB 10.4*   HCT 30.4*   MCV 92.6        BMP:   Recent Labs     02/04/25  1421 02/05/25  0938 02/06/25  1025    141 143   K 4.4 4.3 4.4   CL 93* 99 104   CO2 36* 34* 29   BUN 67* 45* 31*   CREATININE 1.1 0.9 0.7*     LIVER PROFILE:   Recent Labs     02/05/25  0938   AST 41*   ALT 15   BILITOT <0.2   ALKPHOS 76     PT/INR: No results for input(s): \"PROTIME\", \"INR\" in the last 72 hours.  APTT: No results for input(s): \"APTT\" in the last 72 hours.  UA:  Recent Labs     02/04/25  1810   COLORU Yellow   PHUR 6.0   WBCUA 0-2   RBCUA 0-2   CLARITYU Clear   LEUKOCYTESUR Negative   UROBILINOGEN 0.2   BILIRUBINUR Negative   BLOODU TRACE-INTACT*   GLUCOSEU Negative         IMAGING  XR ABDOMEN (2 VIEWS)   Final Result   Dilated loops of small bowel appear unchanged from the prior study compatible   with partial small bowel obstruction.         XR ABDOMEN (2 VIEWS)   Final Result   1. Unchanged dilated loops of small bowel.

## 2025-02-06 NOTE — CARE COORDINATION
Met with pt at bedside. Pt agreeable to SNF. Requested ACM. Referral called to [facility] M. Awaiting return call.     1348- call back from [facility]. Able to accept pt. Precert started.

## 2025-02-07 ENCOUNTER — APPOINTMENT (OUTPATIENT)
Dept: CT IMAGING | Age: 70
DRG: 207 | End: 2025-02-07
Payer: MEDICARE

## 2025-02-07 ENCOUNTER — APPOINTMENT (OUTPATIENT)
Dept: GENERAL RADIOLOGY | Age: 70
DRG: 207 | End: 2025-02-07
Payer: MEDICARE

## 2025-02-07 ENCOUNTER — APPOINTMENT (OUTPATIENT)
Dept: MRI IMAGING | Age: 70
DRG: 207 | End: 2025-02-07
Payer: MEDICARE

## 2025-02-07 PROBLEM — G93.40 ACUTE ENCEPHALOPATHY: Status: ACTIVE | Noted: 2025-02-07

## 2025-02-07 LAB
ANION GAP SERPL CALCULATED.3IONS-SCNC: 5 MMOL/L (ref 3–16)
BASE EXCESS BLDV CALC-SCNC: 7.7 MMOL/L (ref -3–3)
BUN SERPL-MCNC: 20 MG/DL (ref 7–20)
CALCIUM SERPL-MCNC: 8.8 MG/DL (ref 8.3–10.6)
CHLORIDE SERPL-SCNC: 102 MMOL/L (ref 99–110)
CO2 BLDV-SCNC: 37 MMOL/L
CO2 SERPL-SCNC: 38 MMOL/L (ref 21–32)
COHGB MFR BLDV: 0.6 % (ref 0–1.5)
CREAT SERPL-MCNC: 0.8 MG/DL (ref 0.8–1.3)
GFR SERPLBLD CREATININE-BSD FMLA CKD-EPI: >90 ML/MIN/{1.73_M2}
GLUCOSE BLD-MCNC: 101 MG/DL (ref 70–99)
GLUCOSE BLD-MCNC: 119 MG/DL (ref 70–99)
GLUCOSE BLD-MCNC: 80 MG/DL (ref 70–99)
GLUCOSE BLD-MCNC: 86 MG/DL (ref 70–99)
GLUCOSE BLD-MCNC: 93 MG/DL (ref 70–99)
GLUCOSE SERPL-MCNC: 99 MG/DL (ref 70–99)
HCO3 BLDV-SCNC: 34.7 MMOL/L (ref 23–29)
MAGNESIUM SERPL-MCNC: 1.78 MG/DL (ref 1.8–2.4)
METHGB MFR BLDV: 0.2 %
O2 CT VFR BLDV CALC: 12 VOL %
O2 THERAPY: ABNORMAL
PCO2 BLDV: 62.9 MMHG (ref 40–50)
PERFORMED ON: ABNORMAL
PERFORMED ON: ABNORMAL
PERFORMED ON: NORMAL
PH BLDV: 7.36 [PH] (ref 7.35–7.45)
PO2 BLDV: 53.7 MMHG (ref 25–40)
POTASSIUM SERPL-SCNC: 3.3 MMOL/L (ref 3.5–5.1)
SAO2 % BLDV: 86 %
SODIUM SERPL-SCNC: 145 MMOL/L (ref 136–145)

## 2025-02-07 PROCEDURE — 95816 EEG AWAKE AND DROWSY: CPT | Performed by: PSYCHIATRY & NEUROLOGY

## 2025-02-07 PROCEDURE — 6360000002 HC RX W HCPCS: Performed by: INTERNAL MEDICINE

## 2025-02-07 PROCEDURE — 95816 EEG AWAKE AND DROWSY: CPT

## 2025-02-07 PROCEDURE — 97530 THERAPEUTIC ACTIVITIES: CPT

## 2025-02-07 PROCEDURE — 74019 RADEX ABDOMEN 2 VIEWS: CPT

## 2025-02-07 PROCEDURE — 6370000000 HC RX 637 (ALT 250 FOR IP): Performed by: INTERNAL MEDICINE

## 2025-02-07 PROCEDURE — 2700000000 HC OXYGEN THERAPY PER DAY

## 2025-02-07 PROCEDURE — 36415 COLL VENOUS BLD VENIPUNCTURE: CPT

## 2025-02-07 PROCEDURE — 80048 BASIC METABOLIC PNL TOTAL CA: CPT

## 2025-02-07 PROCEDURE — 2500000003 HC RX 250 WO HCPCS: Performed by: INTERNAL MEDICINE

## 2025-02-07 PROCEDURE — 2580000003 HC RX 258: Performed by: INTERNAL MEDICINE

## 2025-02-07 PROCEDURE — 99222 1ST HOSP IP/OBS MODERATE 55: CPT | Performed by: STUDENT IN AN ORGANIZED HEALTH CARE EDUCATION/TRAINING PROGRAM

## 2025-02-07 PROCEDURE — 97110 THERAPEUTIC EXERCISES: CPT

## 2025-02-07 PROCEDURE — APPSS15 APP SPLIT SHARED TIME 0-15 MINUTES

## 2025-02-07 PROCEDURE — 1200000000 HC SEMI PRIVATE

## 2025-02-07 PROCEDURE — 94640 AIRWAY INHALATION TREATMENT: CPT

## 2025-02-07 PROCEDURE — 99233 SBSQ HOSP IP/OBS HIGH 50: CPT | Performed by: INTERNAL MEDICINE

## 2025-02-07 PROCEDURE — 99232 SBSQ HOSP IP/OBS MODERATE 35: CPT | Performed by: SURGERY

## 2025-02-07 PROCEDURE — 83735 ASSAY OF MAGNESIUM: CPT

## 2025-02-07 PROCEDURE — 94761 N-INVAS EAR/PLS OXIMETRY MLT: CPT

## 2025-02-07 PROCEDURE — 82803 BLOOD GASES ANY COMBINATION: CPT

## 2025-02-07 PROCEDURE — 70450 CT HEAD/BRAIN W/O DYE: CPT

## 2025-02-07 RX ORDER — ALPRAZOLAM 0.5 MG
0.5 TABLET ORAL
Status: DISCONTINUED | OUTPATIENT
Start: 2025-02-07 | End: 2025-02-24

## 2025-02-07 RX ADMIN — SODIUM CHLORIDE: 9 INJECTION, SOLUTION INTRAVENOUS at 02:35

## 2025-02-07 RX ADMIN — GABAPENTIN 800 MG: 400 CAPSULE ORAL at 13:41

## 2025-02-07 RX ADMIN — GABAPENTIN 800 MG: 400 CAPSULE ORAL at 21:26

## 2025-02-07 RX ADMIN — ALBUTEROL SULFATE 2.5 MG: 2.5 SOLUTION RESPIRATORY (INHALATION) at 15:30

## 2025-02-07 RX ADMIN — METOPROLOL SUCCINATE 50 MG: 50 TABLET, EXTENDED RELEASE ORAL at 21:26

## 2025-02-07 RX ADMIN — METOPROLOL SUCCINATE 50 MG: 50 TABLET, EXTENDED RELEASE ORAL at 08:56

## 2025-02-07 RX ADMIN — GABAPENTIN 800 MG: 400 CAPSULE ORAL at 17:45

## 2025-02-07 RX ADMIN — SODIUM CHLORIDE, PRESERVATIVE FREE 10 ML: 5 INJECTION INTRAVENOUS at 21:27

## 2025-02-07 RX ADMIN — ATORVASTATIN CALCIUM 40 MG: 40 TABLET, FILM COATED ORAL at 08:56

## 2025-02-07 RX ADMIN — SODIUM CHLORIDE: 9 INJECTION, SOLUTION INTRAVENOUS at 17:44

## 2025-02-07 RX ADMIN — PHENOL 1 SPRAY: 1.5 LIQUID ORAL at 00:05

## 2025-02-07 RX ADMIN — SODIUM CHLORIDE, PRESERVATIVE FREE 10 ML: 5 INJECTION INTRAVENOUS at 08:58

## 2025-02-07 RX ADMIN — ENOXAPARIN SODIUM 40 MG: 100 INJECTION SUBCUTANEOUS at 08:56

## 2025-02-07 RX ADMIN — OXYCODONE HYDROCHLORIDE 10 MG: 5 TABLET ORAL at 02:23

## 2025-02-07 RX ADMIN — GUAIFENESIN 600 MG: 600 TABLET, EXTENDED RELEASE ORAL at 08:56

## 2025-02-07 RX ADMIN — ALBUTEROL SULFATE 2.5 MG: 2.5 SOLUTION RESPIRATORY (INHALATION) at 07:44

## 2025-02-07 RX ADMIN — DILTIAZEM HYDROCHLORIDE 180 MG: 180 CAPSULE, COATED, EXTENDED RELEASE ORAL at 08:56

## 2025-02-07 RX ADMIN — GABAPENTIN 800 MG: 400 CAPSULE ORAL at 08:56

## 2025-02-07 RX ADMIN — Medication 5000 UNITS: at 08:56

## 2025-02-07 RX ADMIN — TIOTROPIUM BROMIDE INHALATION SPRAY 2 PUFF: 3.12 SPRAY, METERED RESPIRATORY (INHALATION) at 07:44

## 2025-02-07 RX ADMIN — ALLOPURINOL 300 MG: 300 TABLET ORAL at 08:56

## 2025-02-07 RX ADMIN — FERROUS SULFATE TAB 325 MG (65 MG ELEMENTAL FE) 325 MG: 325 (65 FE) TAB at 08:56

## 2025-02-07 RX ADMIN — BUDESONIDE AND FORMOTEROL FUMARATE DIHYDRATE 2 PUFF: 160; 4.5 AEROSOL RESPIRATORY (INHALATION) at 19:48

## 2025-02-07 RX ADMIN — BUDESONIDE AND FORMOTEROL FUMARATE DIHYDRATE 2 PUFF: 160; 4.5 AEROSOL RESPIRATORY (INHALATION) at 07:44

## 2025-02-07 RX ADMIN — MORPHINE SULFATE 2 MG: 2 INJECTION, SOLUTION INTRAMUSCULAR; INTRAVENOUS at 18:06

## 2025-02-07 RX ADMIN — FOLIC ACID 1000 MCG: 1 TABLET ORAL at 08:56

## 2025-02-07 RX ADMIN — GUAIFENESIN 600 MG: 600 TABLET, EXTENDED RELEASE ORAL at 21:26

## 2025-02-07 RX ADMIN — DULOXETINE 60 MG: 60 CAPSULE, DELAYED RELEASE ORAL at 08:56

## 2025-02-07 RX ADMIN — INSULIN GLARGINE 15 UNITS: 100 INJECTION, SOLUTION SUBCUTANEOUS at 21:27

## 2025-02-07 RX ADMIN — FERROUS SULFATE TAB 325 MG (65 MG ELEMENTAL FE) 325 MG: 325 (65 FE) TAB at 17:45

## 2025-02-07 RX ADMIN — ALBUTEROL SULFATE 2.5 MG: 2.5 SOLUTION RESPIRATORY (INHALATION) at 19:47

## 2025-02-07 ASSESSMENT — PAIN DESCRIPTION - PAIN TYPE: TYPE: ACUTE PAIN

## 2025-02-07 ASSESSMENT — PAIN DESCRIPTION - ORIENTATION: ORIENTATION: MID;LOWER

## 2025-02-07 ASSESSMENT — PAIN DESCRIPTION - LOCATION
LOCATION: THROAT
LOCATION: ABDOMEN
LOCATION: ABDOMEN

## 2025-02-07 ASSESSMENT — PAIN DESCRIPTION - DESCRIPTORS
DESCRIPTORS: DISCOMFORT
DESCRIPTORS: DISCOMFORT
DESCRIPTORS: ACHING

## 2025-02-07 ASSESSMENT — PAIN SCALES - GENERAL
PAINLEVEL_OUTOF10: 7
PAINLEVEL_OUTOF10: 7

## 2025-02-07 NOTE — DISCHARGE INSTR - COC
Continuity of Care Form    Patient Name: Sandor Early   :  1955  MRN:  [id number]    Admit date:  2025  Discharge date:  3/24/2025    Code Status Order: Full Code   Advance Directives:   Advance Care Flowsheet Documentation             Admitting Physician:  Luda Richey MD  PCP: Juliet Mayorga MD    Discharging Nurse: CONSTANTINO Johnson  Discharging Hospital Unit/Room#: 0220/0220-02  Discharging Unit Phone Number: [phone]    Emergency Contact:   Extended Emergency Contact Information  Primary Emergency Contact: Early,Reagan  Home Phone: [phone]  Mobile Phone: [phone]  Relation: Brother/Sister  Secondary Emergency Contact: Kathleen Cortez   [facility]  Home Phone: [phone]  Mobile Phone: [phone]  Relation: Other    Past Surgical History:  Past Surgical History:   Procedure Laterality Date    ANKLE FRACTURE SURGERY Left 2024    OPEN REDUCTION INTERNAL FIXATION OF LEFT ANKLE FRACTURE DISLOCATION WITH SYNDESMOSIS REPAIR performed by Sudeep Ruth MD at [facility] OR    AORTO-ILIAC BYPASS GRAFT N/A 2020    AORTOBIFEMORAL BYPASS GRAFT performed by Sam Fair MD at [facility] OR    BRONCHOSCOPY  2011    bronch with EBUS    CATARACT REMOVAL Left     COLONOSCOPY N/A 2018    COLONOSCOPY WITH BIOPSY performed by Maicol Fournier MD at [facility] ENDOSCOPY    COLONOSCOPY N/A 2018    COLONOSCOPY POLYPECTOMY SNARE/COLD BIOPSY performed by Maicol Fournier MD at [facility] ENDOSCOPY    COLONOSCOPY N/A 05/10/2022    COLON W/ANES. performed by Diana Gifford MD at [facility] ENDOSCOPY    CYSTOSCOPY  2015    Urethral Dilatation, Resection of Bladder Tumor    CYSTOSCOPY  2014    w/ bladder biopsy    CYSTOSCOPY  2015    w/ urethral dilatation, bladder tumor follow up    CYSTOSCOPY  2016    fulgeration of bladder tumor    ELBOW SURGERY Left     ENDOSCOPY, SMALL BOWEL N/A 2019    BOWEL SMALL CAPSULE ENDOSCOPY performed by Maicol Saxena  smoker F17.200    Diastasis recti M62.08    Hypercholesterolemia E78.00    Callus of foot L84    Bilateral lower leg cellulitis L03.116, L03.115    Orthostatic hypotension I95.1    COVID U07.1    Acute on chronic respiratory failure with hypoxia and hypercapnia J96.21, J96.22    COPD exacerbation ([facility]) J44.1    Dyspnea R06.00    Closed fracture dislocation of left ankle joint S82.892A    Syndesmotic disruption of left ankle S93.432A    Chronic systolic (congestive) heart failure I50.22    Scrotal abscess N49.2    Perineal abscess L02.215    Chronic diastolic CHF (congestive heart failure) ([facility]) I50.32    Diabetes mellitus ([facility]) E11.9    Chronic hypoxemic respiratory failure J96.11    Type 2 diabetes mellitus with hyperglycemia, with long-term current use of insulin ([facility]) E11.65, Z79.4    Hyperlipidemia E78.5    Diabetes due to underlying condition w oth complication ([facility]) E08.69    Respiratory failure with hypoxia J96.91    Acute respiratory failure J96.00    Influenza A J10.1    Alcohol use disorder F10.90    Hypomagnesemia E83.42    SBO (small bowel obstruction) ([facility]) K56.609       Isolation/Infection:   Isolation            Droplet  Droplet  Contact and Droplet          Patient Infection Status       Infection Onset Added Last Indicated Last Indicated By Review Planned Expiration Resolved Resolved By    Influenza 25 Rapid influenza A/B antigens 25      MRSA 23 Pneumonia Panel, Molecular        Resolved    COVID-19 23 COVID-19 & Influenza Combo   23 Infection                        Nurse Assessment:  Last Vital Signs: BP (!) 132/96   Pulse 96   Temp 97.9 °F (36.6 °C) (Oral)   Resp 17   Ht 1.702 m (5' 7.01\")   Wt 85.8 kg (189 lb 3.2 oz)   SpO2 97%   BMI 29.63 kg/m²     Last documented pain score (0-10 scale): Pain Level: 7  Last Weight:   Wt Readings from Last 1 Encounters:   25 85.8 kg (189 lb 3.2 oz)

## 2025-02-07 NOTE — PROGRESS NOTES
General Surgery  Daily Progress Note    Pt Name: Sandor Early  Medical Record Number: [id number]  Date of Birth 1955   Today's Date: 2/7/2025  Admit date: 1/30/2025  LOS: Day 8    SUBJECTIVE  Abdomen feels about the same. Does report passing some gas. No further Bms. Very thirsty.      OBJECTIVE  Vitals:    02/06/25 2033 02/07/25 0223 02/07/25 0744 02/07/25 0845   BP: (!) 183/78 (!) 172/80  (!) 173/86   Pulse: (!) 122 98  96   Resp: 20 19 18   Temp: 97.6 °F (36.4 °C) 98.1 °F (36.7 °C)  97.6 °F (36.4 °C)   TempSrc: Oral Oral  Oral   SpO2: 97%  94% 97%   Weight:  85.8 kg (189 lb 3.2 oz)     Height:           Gen: No distress. Alert.   Resp: Normal rate. Easy and unlabored. No accessory muscle use.   CV: Regular rate. Regular rhythm.   GI: +Diffuse TTP, similar to prior exams. +Softly distended. +Sluggish but present bowel sounds.  Skin: Warm and dry. No nodule or rash on exposed extremities.       I/O last 3 completed shifts:  In: 10 [NG/GT:10]  Out: 4250 [Urine:1300; Emesis/NG output:2950]  No intake/output data recorded.    LABS  CBC:   Recent Labs     02/05/25  0938   WBC 10.8   HGB 10.4*   HCT 30.4*   MCV 92.6        BMP:   Recent Labs     02/04/25  1421 02/05/25  0938 02/06/25  1025    141 143   K 4.4 4.3 4.4   CL 93* 99 104   CO2 36* 34* 29   BUN 67* 45* 31*   CREATININE 1.1 0.9 0.7*     LIVER PROFILE:   Recent Labs     02/05/25  0938   AST 41*   ALT 15   BILITOT <0.2   ALKPHOS 76     PT/INR: No results for input(s): \"PROTIME\", \"INR\" in the last 72 hours.  APTT: No results for input(s): \"APTT\" in the last 72 hours.  UA:  Recent Labs     02/04/25  1810   COLORU Yellow   PHUR 6.0   WBCUA 0-2   RBCUA 0-2   CLARITYU Clear   LEUKOCYTESUR Negative   UROBILINOGEN 0.2   BILIRUBINUR Negative   BLOODU TRACE-INTACT*   GLUCOSEU Negative         IMAGING  XR ABDOMEN (2 VIEWS)   Preliminary Result   No significant change in appearance of a suspected distal small bowel   obstruction, without evidence of

## 2025-02-07 NOTE — PROGRESS NOTES
Speech Language Pathology  Attempt Note     Name: Sandor Early  : 1955  Medical Diagnosis: Hypomagnesemia [E83.42]  Influenza A [J10.1]  COPD exacerbation (HCC) [J44.1]  Respiratory failure with hypoxia [J96.91]  Alcohol use disorder [F10.90]  Acute on chronic respiratory failure with hypoxia and hypercapnia [J96.21, J96.22]  Acute respiratory failure [J96.00]      SLP attempted to see pt for dysphagia tx. Treatment unable to be completed due to pt NPO per chart review and RN report. SLP to re-attempt as pt's condition and schedule allows. RN aware. No charges.    Thank you,    Karol Watkins MA CF-SLP   Speech Language Pathologist

## 2025-02-07 NOTE — PROGRESS NOTES
Comprehensive Nutrition Assessment    Type and Reason for Visit:  Initial, LOS (LOS x 8 days)    Nutrition Recommendations/Plan:   Continue NPO status until patient is medically cleared to receive nutrition therapy.   Monitor GI status and whether diet is advanced.   Monitor nutrition-related labs, bowel function, and weight trends.      Malnutrition Assessment:  Malnutrition Status:  At risk for malnutrition (02/07/25 1145)    Context:  Acute Illness     Findings of the 6 clinical characteristics of malnutrition:  Energy Intake:  50% or less of estimated energy requirements for 5 or more days  Weight Loss:  No weight loss     Body Fat Loss:  No body fat loss     Muscle Mass Loss:  No muscle mass loss    Fluid Accumulation:  No fluid accumulation     Strength:  Not Performed    Nutrition Assessment:    patient is nutritionally compromised - inadequate oral intake r/t inadequate protein-energy intake and altered GI function + impaired respiratory function AEB poor po intake duirng admission, NPO status, possible SBO vs ileus, and hx of COPD + patient is flu positive at this time; he is at risk for further compromise d/t decreased appetite, NPO status, altered GI function, and altered nutrition-related labs; will continue NPO status and monitor nutrition plan of care    Nutrition Related Findings:    patient is A & O x 2 but disoriented at times as well; patient presented with c/o SOB and respiratory dysfunction; patient c/o constipation and imaging was completed + enemas were ordered and patient had a BM on 2/3, 2/4, and 2/5 but they were hard and small and patient still c/o constipation afterward; + possible SBO vs ileus, per surgery notes; patient is NPO at this time; + NG tube in place for suction; when patient had a diet order, he was only consuming 1-25% of meals Wound Type: None       Current Nutrition Intake & Therapies:    Average Meal Intake: NPO  Average Supplements Intake: NPO  Diet NPO Exceptions are:

## 2025-02-07 NOTE — PROGRESS NOTES
Inpatient Physical Therapy Treatment    Unit: [facility]  Date:  2/7/2025  Patient Name:    Sandor Early  Admitting diagnosis:  Hypomagnesemia [E83.42]  Influenza A [J10.1]  COPD exacerbation (HCC) [J44.1]  Respiratory failure with hypoxia [J96.91]  Alcohol use disorder [F10.90]  Acute on chronic respiratory failure with hypoxia and hypercapnia [J96.21, J96.22]  Acute respiratory failure [J96.00]  Admit Date:  1/30/2025  Precautions/Restrictions/WB Status/ Lines/ Wounds/ Oxygen: Fall risk, Bed/chair alarm, Lines (IV, Supplemental O2 (4L), and NG tube), Telemetry, and Isolation Precautions: Contact and Droplet    Treatment Time:  09:32-10:02  Treatment Number:  1   Timed Code Treatment Minutes: 30 minutes  Total Treatment Minutes:  30  minutes    Patient Stated Goals for Therapy: \" not stated \"          Discharge Recommendations: SNF.  DME needs for discharge: Defer to facility       Therapy recommendation for EMS Transport: can transport by wheelchair    Therapy recommendations for staff:   Assist of 1 for stand-pivot transfers with No AD to/from BSC  to/from chair    History of Present Illness:   Admitted with influenza A,acute copd exacerbation   AXR And CT shows possible partial SBO. General surgery consulted, conservative management at this time.     Preadmission Environment: from OT shagufta 10/24/24:  Pt lives with                                    with family (step son, DIL, and 3 grandkids)  Home environment:                                  mobile home/trailer  Steps to enter first floor:                3 steps to enter and hand rail bilateral  Steps to second floor/basement:   N/A  Laundry:                                         Pt completes  Bathroom:                                      tub/shower unit and comfort height toilet  Pt sleeps in a:                                Flat bed  Equipment owned:                         RW, SPC, and home O2 (4L) continous     Preadmission Status:  Pt able to drive:

## 2025-02-07 NOTE — PROGRESS NOTES
Patient noted with seizure activity, therapy at bedside. Per therapy seizure activity lasted 20 seconds. Patient currently has no pain and or discomfort noted. MD notified, at bedside, new orders placed. Family updated. All safety precautions in place

## 2025-02-07 NOTE — PLAN OF CARE
Problem: Chronic Conditions and Co-morbidities  Goal: Patient's chronic conditions and co-morbidity symptoms are monitored and maintained or improved  2/7/2025 1043 by Enrique Watt RN  Outcome: Progressing  2/7/2025 0428 by Satnam Salas RN  Outcome: Progressing  Flowsheets (Taken 2/6/2025 2055)  Care Plan - Patient's Chronic Conditions and Co-Morbidity Symptoms are Monitored and Maintained or Improved: Monitor and assess patient's chronic conditions and comorbid symptoms for stability, deterioration, or improvement     Problem: Discharge Planning  Goal: Discharge to home or other facility with appropriate resources  2/7/2025 1043 by Enrique Watt RN  Outcome: Progressing  2/7/2025 0428 by Satnam Salas RN  Outcome: Progressing  Flowsheets (Taken 2/6/2025 2055)  Discharge to home or other facility with appropriate resources: Identify barriers to discharge with patient and caregiver     Problem: Safety - Adult  Goal: Free from fall injury  2/7/2025 1043 by Enrique Watt RN  Outcome: Progressing  2/7/2025 0428 by Satnam Salas RN  Outcome: Progressing     Problem: Pain  Goal: Verbalizes/displays adequate comfort level or baseline comfort level  2/7/2025 1043 by Enrique Watt RN  Outcome: Progressing  2/7/2025 0428 by Satnam Salas RN  Outcome: Progressing  Flowsheets (Taken 2/7/2025 0223)  Verbalizes/displays adequate comfort level or baseline comfort level: Encourage patient to monitor pain and request assistance     Problem: Respiratory - Adult  Goal: Achieves optimal ventilation and oxygenation  2/7/2025 1043 by Enrique Watt RN  Outcome: Progressing  2/7/2025 0428 by Satnam Salas RN  Outcome: Progressing  Flowsheets (Taken 2/6/2025 2055)  Achieves optimal ventilation and oxygenation: Assess for changes in respiratory status     Problem: Cardiovascular - Adult  Goal: Maintains optimal cardiac output and hemodynamic stability  2/7/2025 1043 by Enrique Watt RN  Outcome:

## 2025-02-07 NOTE — PROCEDURES
INTERPRETATION:  This 25-minute, computer-assisted video EEG recording is abnormal.  It showed moderate degree of continuous generalized slowing background activity.      There was no clear-cut epileptiform discharge in the study.      The EEG findings were consistent with moderate degree of nonspecific generalized cerebral dysfunction.    CLASSIFICATION:  Dysrhythmia grade 2, generalized.  EKG channel.    DESCRIPTION:    BACKGROUND: The EEG background showed continuous generalized intermixed 2 to 7 Hz theta/delta activity.  The EEG showed fair variability and reactivity.  There was no significant change on the EEG background with photic stimulation.  Hyperventilation was omitted due to patient's condition.    INTERICTAL DISCHARGES: None    CLINICAL EVENTS:  None    The EKG channel was unremarkable.

## 2025-02-07 NOTE — PROGRESS NOTES
RT Inhaler-Nebulizer Bronchodilator Protocol Note    There is a bronchodilator order in the chart from a provider indicating to follow the RT Bronchodilator Protocol and there is an “Initiate RT Inhaler-Nebulizer Bronchodilator Protocol” order as well (see protocol at bottom of note).    CXR Findings:  No results found.    The findings from the last RT Protocol Assessment were as follows:   History Pulmonary Disease: Chronic pulmonary disease  Respiratory Pattern: Dyspnea on exertion or RR 21-25 bpm  Breath Sounds: Intermittent or unilateral wheezes  Cough: Strong, spontaneous, non-productive  Indication for Bronchodilator Therapy: On home bronchodilators  Bronchodilator Assessment Score: 8    Aerosolized bronchodilator medication orders have been revised according to the RT Inhaler-Nebulizer Bronchodilator Protocol below.    Respiratory Therapist to perform RT Therapy Protocol Assessment initially then follow the protocol.  Repeat RT Therapy Protocol Assessment PRN for score 0-3 or on second treatment, BID, and PRN for scores above 3.    No Indications - adjust the frequency to every 6 hours PRN wheezing or bronchospasm, if no treatments needed after 48 hours then discontinue using Per Protocol order mode.     If indication present, adjust the RT bronchodilator orders based on the Bronchodilator Assessment Score as indicated below.  Use Inhaler orders unless patient has one or more of the following: on home nebulizer, not able to hold breath for 10 seconds, is not alert and oriented, cannot activate and use MDI correctly, or respiratory rate 25 breaths per minute or more, then use the equivalent nebulizer order(s) with same Frequency and PRN reasons based on the score.  If a patient is on this medication at home then do not decrease Frequency below that used at home.    0-3 - enter or revise RT bronchodilator order(s) to equivalent RT Bronchodilator order with Frequency of every 4 hours PRN for wheezing or

## 2025-02-07 NOTE — PROGRESS NOTES
Patient arrived to the MRI department with NG tube in place, 4L O2 and urinary catheter in place.  Patient seemed to be distraught and confused.  Testing was explained to him and we proceeded to move him onto the MRI scan table.    The first sequence for the MRI BRAIN w/wo seizure protocol was run.  There was motion through out from patient moving.    He expressed he \"did not want to do all this with the noise\" that it was too much for him.  We attempted to explain the test again to him. He again voiced that he did not want to do the test.  He was moved back to the hospital bed and transported back to the floor.  At this time, he is not a candidate for a diagnostic quality exam to provide the needed information requested.

## 2025-02-07 NOTE — CONSULTS
Inpatient Neurology Consult  [facility] Neurology  Janes Sainz MD    Snador Early  1955    Date of Service: 2/7/2025    Referring Physician: Drake Jalloh*    Reason for the consult and CC: Seizure-like activity    HPI:   Sandor Early is a 69 y.o. 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 Admitted for acute on chronic hypoxic and hypercarbic respiratory failure due to influenza A and COPD exacerbation, partial small bowel obstruction versus ileus.  He has a notable history of Whyte and alcoholic cirrhosis diabetic neuropathy, peripheral vascular disease, chronic pain, and chronic diastolic heart failure.    According to the physical therapy note on 2/7:  \"During therapy session noted pt with sudden onset of seizure like activity. Pt was sitting at EOB and after about 2 minutes in that position, he suddenly started with seizure-like tremors in bilat UEs, trunk, and head. He stated that he was \"having another seizure\". Writer assisted him back into bed quickly and patient cont with tremors and closed his eyes. He responded at all times to strong verbal cues. Writer called staff assist. MD and RN arrived for further assessement.\"     He knows he is in the hospital but he does not know why.  He says that he has a history of seizures as a child that he grew out of.  These manifested as passing out according to him.  He  Patient resting in bed. Eyes closed, respirations even and unlabored.  No distress noted.  Has slept 4.5 hours so far.  Will continue to perform safety checks every 15 minutes.

## 2025-02-07 NOTE — PROGRESS NOTES
Patient alert and oriented with confusion at times. Noted with increase behaviors yelling out for something to drink and eat. RN educated on diagnoses, diet order and importance of being compliant. Patient verbalized understanding. Took oral medications tolerated well, no complaints of N/V. Patient given bed bath and chavira care, tolerated well. Patient declined to get out of bed, became emotional stating just ready to go home. Call light and bedside table within reach. Mouth swabs utilize frequently. Fall precautions in place.

## 2025-02-08 ENCOUNTER — APPOINTMENT (OUTPATIENT)
Dept: GENERAL RADIOLOGY | Age: 70
DRG: 207 | End: 2025-02-08
Payer: MEDICARE

## 2025-02-08 LAB
ALBUMIN SERPL-MCNC: 2.8 G/DL (ref 3.4–5)
ALP SERPL-CCNC: 70 U/L (ref 40–129)
ALT SERPL-CCNC: 13 U/L (ref 10–40)
AMMONIA PLAS-SCNC: 20 UMOL/L (ref 16–60)
ANION GAP SERPL CALCULATED.3IONS-SCNC: 12 MMOL/L (ref 3–16)
AST SERPL-CCNC: 33 U/L (ref 15–37)
BASE EXCESS BLDA CALC-SCNC: 8.1 MMOL/L (ref -3–3)
BASE EXCESS BLDA CALC-SCNC: 8.6 MMOL/L (ref -3–3)
BILIRUB DIRECT SERPL-MCNC: 0.3 MG/DL (ref 0–0.3)
BILIRUB INDIRECT SERPL-MCNC: 0.1 MG/DL (ref 0–1)
BILIRUB SERPL-MCNC: 0.4 MG/DL (ref 0–1)
BUN SERPL-MCNC: 17 MG/DL (ref 7–20)
CALCIUM SERPL-MCNC: 8.5 MG/DL (ref 8.3–10.6)
CHLORIDE SERPL-SCNC: 102 MMOL/L (ref 99–110)
CO2 BLDA-SCNC: 36 MMOL/L
CO2 BLDA-SCNC: 36.3 MMOL/L
CO2 SERPL-SCNC: 33 MMOL/L (ref 21–32)
COHGB MFR BLDA: 0.3 % (ref 0–1.5)
COHGB MFR BLDA: 0.3 % (ref 0–1.5)
CREAT SERPL-MCNC: 0.7 MG/DL (ref 0.8–1.3)
DEPRECATED RDW RBC AUTO: 15.7 % (ref 12.4–15.4)
EKG ATRIAL RATE: 111 BPM
EKG DIAGNOSIS: NORMAL
EKG P AXIS: 76 DEGREES
EKG P-R INTERVAL: 192 MS
EKG Q-T INTERVAL: 322 MS
EKG QRS DURATION: 84 MS
EKG QTC CALCULATION (BAZETT): 437 MS
EKG R AXIS: -60 DEGREES
EKG T AXIS: 69 DEGREES
EKG VENTRICULAR RATE: 111 BPM
GFR SERPLBLD CREATININE-BSD FMLA CKD-EPI: >90 ML/MIN/{1.73_M2}
GLUCOSE BLD-MCNC: 168 MG/DL (ref 70–99)
GLUCOSE BLD-MCNC: 84 MG/DL (ref 70–99)
GLUCOSE BLD-MCNC: 85 MG/DL (ref 70–99)
GLUCOSE BLD-MCNC: 87 MG/DL (ref 70–99)
GLUCOSE BLD-MCNC: 89 MG/DL (ref 70–99)
GLUCOSE SERPL-MCNC: 72 MG/DL (ref 70–99)
HCO3 BLDA-SCNC: 34.3 MMOL/L (ref 21–29)
HCO3 BLDA-SCNC: 34.6 MMOL/L (ref 21–29)
HCT VFR BLD AUTO: 29.8 % (ref 40.5–52.5)
HGB BLD-MCNC: 9.9 G/DL (ref 13.5–17.5)
HGB BLDA-MCNC: 10.1 G/DL (ref 13.5–17.5)
HGB BLDA-MCNC: 10.4 G/DL (ref 13.5–17.5)
MAGNESIUM SERPL-MCNC: 1.73 MG/DL (ref 1.8–2.4)
MCH RBC QN AUTO: 31.5 PG (ref 26–34)
MCHC RBC AUTO-ENTMCNC: 33.1 G/DL (ref 31–36)
MCV RBC AUTO: 95.4 FL (ref 80–100)
METHGB MFR BLDA: 0.2 %
METHGB MFR BLDA: 0.4 %
O2 THERAPY: ABNORMAL
O2 THERAPY: ABNORMAL
PCO2 BLDA: 55.6 MMHG (ref 35–45)
PCO2 BLDA: 56.7 MMHG (ref 35–45)
PERFORMED ON: ABNORMAL
PERFORMED ON: NORMAL
PH BLDA: 7.4 [PH] (ref 7.35–7.45)
PH BLDA: 7.41 [PH] (ref 7.35–7.45)
PHOSPHATE SERPL-MCNC: 2.9 MG/DL (ref 2.5–4.9)
PLATELET # BLD AUTO: 320 K/UL (ref 135–450)
PMV BLD AUTO: 8 FL (ref 5–10.5)
PO2 BLDA: 132 MMHG (ref 75–108)
PO2 BLDA: 296.6 MMHG (ref 75–108)
POTASSIUM SERPL-SCNC: 4.1 MMOL/L (ref 3.5–5.1)
PROT SERPL-MCNC: 5.4 G/DL (ref 6.4–8.2)
RBC # BLD AUTO: 3.13 M/UL (ref 4.2–5.9)
SAO2 % BLDA: 98.6 %
SAO2 % BLDA: 99.7 %
SODIUM SERPL-SCNC: 147 MMOL/L (ref 136–145)
TROPONIN, HIGH SENSITIVITY: 17 NG/L (ref 0–22)
TROPONIN, HIGH SENSITIVITY: 17 NG/L (ref 0–22)
TROPONIN, HIGH SENSITIVITY: 19 NG/L (ref 0–22)
WBC # BLD AUTO: 7.9 K/UL (ref 4–11)

## 2025-02-08 PROCEDURE — 99233 SBSQ HOSP IP/OBS HIGH 50: CPT | Performed by: SURGERY

## 2025-02-08 PROCEDURE — 6370000000 HC RX 637 (ALT 250 FOR IP): Performed by: INTERNAL MEDICINE

## 2025-02-08 PROCEDURE — 85027 COMPLETE CBC AUTOMATED: CPT

## 2025-02-08 PROCEDURE — 82803 BLOOD GASES ANY COMBINATION: CPT

## 2025-02-08 PROCEDURE — 6360000002 HC RX W HCPCS: Performed by: STUDENT IN AN ORGANIZED HEALTH CARE EDUCATION/TRAINING PROGRAM

## 2025-02-08 PROCEDURE — 99233 SBSQ HOSP IP/OBS HIGH 50: CPT | Performed by: INTERNAL MEDICINE

## 2025-02-08 PROCEDURE — 2500000003 HC RX 250 WO HCPCS: Performed by: INTERNAL MEDICINE

## 2025-02-08 PROCEDURE — 2500000003 HC RX 250 WO HCPCS: Performed by: STUDENT IN AN ORGANIZED HEALTH CARE EDUCATION/TRAINING PROGRAM

## 2025-02-08 PROCEDURE — 94640 AIRWAY INHALATION TREATMENT: CPT

## 2025-02-08 PROCEDURE — 84484 ASSAY OF TROPONIN QUANT: CPT

## 2025-02-08 PROCEDURE — 94761 N-INVAS EAR/PLS OXIMETRY MLT: CPT

## 2025-02-08 PROCEDURE — 2700000000 HC OXYGEN THERAPY PER DAY

## 2025-02-08 PROCEDURE — 71045 X-RAY EXAM CHEST 1 VIEW: CPT

## 2025-02-08 PROCEDURE — 2580000003 HC RX 258: Performed by: INTERNAL MEDICINE

## 2025-02-08 PROCEDURE — 93005 ELECTROCARDIOGRAM TRACING: CPT | Performed by: INTERNAL MEDICINE

## 2025-02-08 PROCEDURE — 83735 ASSAY OF MAGNESIUM: CPT

## 2025-02-08 PROCEDURE — 82140 ASSAY OF AMMONIA: CPT

## 2025-02-08 PROCEDURE — 74019 RADEX ABDOMEN 2 VIEWS: CPT

## 2025-02-08 PROCEDURE — 6360000002 HC RX W HCPCS: Performed by: INTERNAL MEDICINE

## 2025-02-08 PROCEDURE — 80048 BASIC METABOLIC PNL TOTAL CA: CPT

## 2025-02-08 PROCEDURE — 80076 HEPATIC FUNCTION PANEL: CPT

## 2025-02-08 PROCEDURE — 2000000000 HC ICU R&B

## 2025-02-08 PROCEDURE — 36600 WITHDRAWAL OF ARTERIAL BLOOD: CPT

## 2025-02-08 PROCEDURE — 99232 SBSQ HOSP IP/OBS MODERATE 35: CPT | Performed by: STUDENT IN AN ORGANIZED HEALTH CARE EDUCATION/TRAINING PROGRAM

## 2025-02-08 PROCEDURE — 36415 COLL VENOUS BLD VENIPUNCTURE: CPT

## 2025-02-08 PROCEDURE — 93010 ELECTROCARDIOGRAM REPORT: CPT | Performed by: INTERNAL MEDICINE

## 2025-02-08 PROCEDURE — 84100 ASSAY OF PHOSPHORUS: CPT

## 2025-02-08 RX ORDER — LEVETIRACETAM 500 MG/5ML
2000 INJECTION, SOLUTION, CONCENTRATE INTRAVENOUS
Status: COMPLETED | OUTPATIENT
Start: 2025-02-08 | End: 2025-02-08

## 2025-02-08 RX ORDER — METOPROLOL TARTRATE 1 MG/ML
5 INJECTION, SOLUTION INTRAVENOUS EVERY 6 HOURS
Status: DISCONTINUED | OUTPATIENT
Start: 2025-02-08 | End: 2025-02-10

## 2025-02-08 RX ORDER — GUAIFENESIN 200 MG/10ML
200 LIQUID ORAL EVERY 4 HOURS PRN
Status: DISCONTINUED | OUTPATIENT
Start: 2025-02-08 | End: 2025-03-25 | Stop reason: HOSPADM

## 2025-02-08 RX ORDER — SODIUM CHLORIDE 450 MG/100ML
INJECTION, SOLUTION INTRAVENOUS CONTINUOUS
Status: DISCONTINUED | OUTPATIENT
Start: 2025-02-08 | End: 2025-02-10

## 2025-02-08 RX ORDER — LEVETIRACETAM 500 MG/5ML
1000 INJECTION, SOLUTION, CONCENTRATE INTRAVENOUS 2 TIMES DAILY
Status: DISCONTINUED | OUTPATIENT
Start: 2025-02-08 | End: 2025-02-12

## 2025-02-08 RX ADMIN — ALBUTEROL SULFATE 2.5 MG: 2.5 SOLUTION RESPIRATORY (INHALATION) at 08:34

## 2025-02-08 RX ADMIN — Medication 5000 UNITS: at 08:15

## 2025-02-08 RX ADMIN — ALBUTEROL SULFATE 2.5 MG: 2.5 SOLUTION RESPIRATORY (INHALATION) at 19:12

## 2025-02-08 RX ADMIN — ATORVASTATIN CALCIUM 40 MG: 40 TABLET, FILM COATED ORAL at 08:16

## 2025-02-08 RX ADMIN — GABAPENTIN 800 MG: 400 CAPSULE ORAL at 12:22

## 2025-02-08 RX ADMIN — ENOXAPARIN SODIUM 40 MG: 100 INJECTION SUBCUTANEOUS at 08:16

## 2025-02-08 RX ADMIN — LEVETIRACETAM 2000 MG: 100 INJECTION INTRAVENOUS at 12:21

## 2025-02-08 RX ADMIN — ALBUTEROL SULFATE 2.5 MG: 2.5 SOLUTION RESPIRATORY (INHALATION) at 11:19

## 2025-02-08 RX ADMIN — FERROUS SULFATE TAB 325 MG (65 MG ELEMENTAL FE) 325 MG: 325 (65 FE) TAB at 16:58

## 2025-02-08 RX ADMIN — GUAIFENESIN 200 MG: 100 SOLUTION ORAL at 22:29

## 2025-02-08 RX ADMIN — SODIUM CHLORIDE: 9 INJECTION, SOLUTION INTRAVENOUS at 08:20

## 2025-02-08 RX ADMIN — ALBUTEROL SULFATE 2.5 MG: 2.5 SOLUTION RESPIRATORY (INHALATION) at 15:23

## 2025-02-08 RX ADMIN — METOPROLOL TARTRATE 5 MG: 5 INJECTION, SOLUTION INTRAVENOUS at 22:29

## 2025-02-08 RX ADMIN — GABAPENTIN 800 MG: 400 CAPSULE ORAL at 21:42

## 2025-02-08 RX ADMIN — MORPHINE SULFATE 2 MG: 2 INJECTION, SOLUTION INTRAMUSCULAR; INTRAVENOUS at 08:16

## 2025-02-08 RX ADMIN — TIOTROPIUM BROMIDE INHALATION SPRAY 2 PUFF: 3.12 SPRAY, METERED RESPIRATORY (INHALATION) at 08:36

## 2025-02-08 RX ADMIN — SODIUM CHLORIDE: 4.5 INJECTION, SOLUTION INTRAVENOUS at 16:55

## 2025-02-08 RX ADMIN — ALLOPURINOL 300 MG: 300 TABLET ORAL at 08:16

## 2025-02-08 RX ADMIN — BUDESONIDE AND FORMOTEROL FUMARATE DIHYDRATE 2 PUFF: 160; 4.5 AEROSOL RESPIRATORY (INHALATION) at 19:12

## 2025-02-08 RX ADMIN — FOLIC ACID 1000 MCG: 1 TABLET ORAL at 08:16

## 2025-02-08 RX ADMIN — SODIUM CHLORIDE, PRESERVATIVE FREE 10 ML: 5 INJECTION INTRAVENOUS at 21:43

## 2025-02-08 RX ADMIN — METOPROLOL SUCCINATE 50 MG: 50 TABLET, EXTENDED RELEASE ORAL at 08:16

## 2025-02-08 RX ADMIN — DULOXETINE 60 MG: 60 CAPSULE, DELAYED RELEASE ORAL at 08:16

## 2025-02-08 RX ADMIN — SODIUM CHLORIDE, PRESERVATIVE FREE 10 ML: 5 INJECTION INTRAVENOUS at 08:30

## 2025-02-08 RX ADMIN — BUDESONIDE AND FORMOTEROL FUMARATE DIHYDRATE 2 PUFF: 160; 4.5 AEROSOL RESPIRATORY (INHALATION) at 08:36

## 2025-02-08 RX ADMIN — LEVETIRACETAM 1000 MG: 100 INJECTION INTRAVENOUS at 21:42

## 2025-02-08 RX ADMIN — GUAIFENESIN 600 MG: 600 TABLET, EXTENDED RELEASE ORAL at 08:16

## 2025-02-08 RX ADMIN — PHENOL 1 SPRAY: 1.5 LIQUID ORAL at 16:55

## 2025-02-08 RX ADMIN — FERROUS SULFATE TAB 325 MG (65 MG ELEMENTAL FE) 325 MG: 325 (65 FE) TAB at 08:16

## 2025-02-08 RX ADMIN — DILTIAZEM HYDROCHLORIDE 180 MG: 180 CAPSULE, COATED, EXTENDED RELEASE ORAL at 08:16

## 2025-02-08 RX ADMIN — GABAPENTIN 800 MG: 400 CAPSULE ORAL at 16:56

## 2025-02-08 RX ADMIN — GABAPENTIN 800 MG: 400 CAPSULE ORAL at 08:15

## 2025-02-08 ASSESSMENT — PAIN SCALES - GENERAL
PAINLEVEL_OUTOF10: 0
PAINLEVEL_OUTOF10: 5
PAINLEVEL_OUTOF10: 7

## 2025-02-08 ASSESSMENT — PAIN DESCRIPTION - LOCATION
LOCATION: ABDOMEN
LOCATION: THROAT

## 2025-02-08 NOTE — PROGRESS NOTES
Pulmonary Medicine Progress Note :                                                               CC: COPD and flu and SOB     Subjective:  Seen prior by myself for fluid COPD which have been stable.  The patient was still being treated for an ileus and remained in the hospital.  Today he pulled out his NG tube and possibly aspirated some.  He was hypoxic for short time and is now back to 5 L of oxygen.  He is not in any respiratory distress.  He is confused    PHYSICAL EXAM:  /71   Pulse 98   Temp (!) 96.5 °F (35.8 °C) (Axillary)   Resp 20   Ht 1.702 m (5' 7.01\")   Wt 85.8 kg (189 lb 3.2 oz)   SpO2 100%   BMI 29.63 kg/m²  5L  Constitutional:  No acute distress.   Eyes: PERRL. Conjunctivae anicteric.   ENT: Normal nose. Normal tongue.    Neck:  Trachea is midline.   Respiratory: No accessory muscle usage.   Decreased breath sounds. No wheezes. No rales. No Rhonchi.  Cardiovascular: Normal S1S2. No digit clubbing. No digit cyanosis. No LE edema.   Psychiatric: No anxiety or Agitation. Alert and Oriented to person only    LABS:  CBC:   Recent Labs     02/08/25  0633   WBC 7.9   HGB 9.9*   HCT 29.8*   MCV 95.4        BMP:   Recent Labs     02/06/25  1025 02/07/25  1010 02/08/25  0633    145 147*   K 4.4 3.3* 4.1    102 102   CO2 29 38* 33*   PHOS  --   --  2.9   BUN 31* 20 17   CREATININE 0.7* 0.8 0.7*     LIVER PROFILE:   No results for input(s): \"AST\", \"ALT\", \"LIPASE\", \"AMYLASE\", \"BILIDIR\", \"BILITOT\", \"ALKPHOS\" in the last 72 hours.    Invalid input(s): \"ALB\"    Flu +     Imaging:  Chest imaging was reviewed by me and showed 2/8/25 CXR: Unchanged blunting of the left costophrenic angle otherwise clear lungs    ASSESSMENT:  Acute COPD exacerbation  Chronic hypoxia on 4 L O2  Flu A +   Tobacco abuse  Ileus  ROCKWELL cirrhosis  Chronic diastolic dysfunction  DM2  PVD status post aortoiliac bpg  Patient has had several episodes of seizure-like activity  that      PLAN:  Supplemental oxygen to maintain SaO2 >92%; wean as tolerated    Completed Tamiflu and zpak  Tobacco cessation counseling   Bronchodilators  Completed 5 days Prednisone   Change maintenance IV fluid to half-normal saline  Incentive spirmetery and flutter valve   N.p.o. and NG tube to low intermittent suction per surgery consult  Keppra started by neurology  Lovenox DVT prophylaxis

## 2025-02-08 NOTE — PROGRESS NOTES
[facility] GENERAL SURGERY DAILY PROGRESS NOTE    SUBJECTIVE: Awake, alert.  Feels about the same.     OBJECTIVE: CURRENT VITALS:  BP (!) 173/76   Pulse (!) 110   Temp 98.1 °F (36.7 °C) (Oral)   Resp 20   Ht 1.702 m (5' 7.01\")   Wt 85.8 kg (189 lb 3.2 oz)   SpO2 99%   BMI 29.63 kg/m²          ABD: Distended.  Tender to palpation without peritoneal signs.     LABS:    CBC:   Recent Labs     02/05/25  0938 02/08/25  0633   WBC 10.8 7.9   RBC 3.28* 3.13*   HGB 10.4* 9.9*   HCT 30.4* 29.8*   MCV 92.6 95.4   RDW 15.1 15.7*    320     BMP:   Recent Labs     02/06/25  1025 02/07/25  1010 02/08/25  0633    145 147*   K 4.4 3.3* 4.1    102 102   CO2 29 38* 33*   PHOS  --   --  2.9   BUN 31* 20 17   CREATININE 0.7* 0.8 0.7*     Recent Labs     02/07/25  1010 02/08/25  0633   MG 1.78* 1.73*       XRAYS: AXR with pattern of SBO      ASSESSMENT:   Influenza A  Ileus versus SBO with clinical course, duration, exam and imaging favoring SBO        PLAN:   Continue current NPO, NG and supportive treatments.    OOB as tolerated.    Pulmonary toilet.    Will need to consider surgical intervention early in the week if not improved.          LINDY DE SANTIAGO MD

## 2025-02-08 NOTE — PROGRESS NOTES
Admit: 2025    Name:  Sandor Early  Room:  [id number]  MRN:    [id number]     Daily Progress Note for 2025     Admitted with influenza A,acute copd exacerbation  Placed on BiPAP    Interval History:     Breathing stable on home o2 of  4L.  Did not use BiPAP overnight.    Passing some gas   No BM    Had a focal seizure involving LUE this am.  Lasted for a couple of minutes   I witnessed it.  No LOC  He says that the used to take seizure meds in the past        Passing gas  Minimal BM  No further seizures   More short of breath today  Scheduled Meds:   albuterol  2.5 mg Nebulization 4x Daily RT    metoprolol succinate  50 mg Oral BID    LORazepam  2 mg Oral Once    sodium chloride flush  5-40 mL IntraVENous 2 times per day    enoxaparin  40 mg SubCUTAneous Daily    guaiFENesin  600 mg Oral BID    insulin lispro  0-4 Units SubCUTAneous 4x Daily AC & HS    gabapentin  800 mg Oral 4x Daily    dilTIAZem  180 mg Oral Daily    allopurinol  300 mg Oral Daily    vitamin D3  5,000 Units Oral Daily    [Held by provider] furosemide  20 mg Oral Daily    folic acid  1,000 mcg Oral Daily    ferrous sulfate  325 mg Oral BID WC    insulin glargine  15 Units SubCUTAneous Nightly    DULoxetine  60 mg Oral Daily    atorvastatin  40 mg Oral Daily    budesonide-formoterol  2 puff Inhalation BID RT    And    tiotropium  2 puff Inhalation Daily RT       Continuous Infusions:   sodium chloride 75 mL/hr at 25 0820    sodium chloride      dextrose         PRN Meds:  benzocaine, ALPRAZolam, morphine, phenol, albuterol sulfate HFA, sodium chloride flush, sodium chloride, ondansetron **OR** ondansetron, acetaminophen **OR** acetaminophen, benzonatate, albuterol, dextrose bolus **OR** dextrose bolus, glucagon (rDNA), dextrose, potassium chloride **OR** potassium chloride, magnesium sulfate, calcium carbonate, oxyCODONE HCl, glucose           Objective:     Temp  Av.1 °F (36.7 °C)  Min: 96.3 °F (35.7 °C)  Max: 98.9 °F

## 2025-02-08 NOTE — PROGRESS NOTES
Neurology Follow-up  [facility] Neurology  Janes Sainz MD    Date of Service: 02/08/25        Sandor Early is a 69 y.o. 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    Subjective:   CC: Follow up today regarding: Seizure-like activity    Events noted. Chart and lab reviewed.     He did not tolerate the MRI study.  This resulted in incomplete study and motion artifact.  Xanax was ordered for the MRI study but it was not given presumably due to respiratory issues.    He had another seizure-like event 2/7 evening and afterwards was able to voice demands.    On 2/8 he was placed on BiPAP.    ROS : A 10-12 system review obtained and updated today and is unremarkable except as mentioned  in my interval history.     Medication, past medical history, social history, and family history reviewed.    Physical Examination    Mental status:   Alert  Disoriented  Inattentive  Fluency preserved follows simple commands    Cranial nerves:  Ocular motility was full  No nystagmus   Facial strength symmetrically intact   No dysarthria  Hearing acuity was normal    Tongue protrusion normal     Motor:   Normal muscle bulk  Normal muscle tone   Moving BUE and BLE symmetric at least greater than antigravity.      Reflexes:   DTR 2/4 biceps symmetric   DTR 2/4 patella symmetric     Coordination:   No tremor observed  Negative myoclonus was present in the  consider EEG on Monday depending on his response to Keppra    Brain MRI is too high risk at this time because he will require sedation he has respiratory compromise.  Low suspicion at this time for an acute abnormality given lack of acute abnormality on head CT and lack of focal findings on exam although limited based on his cooperation with examination.    Follow up neurology clinic 3 months.     Neurology will continue to follow.    Electronically signed by Art Sainz MD on 2/8/2025 at 6:32 PM

## 2025-02-08 NOTE — PLAN OF CARE
Problem: Chronic Conditions and Co-morbidities  Goal: Patient's chronic conditions and co-morbidity symptoms are monitored and maintained or improved  Outcome: Progressing     Problem: Discharge Planning  Goal: Discharge to home or other facility with appropriate resources  Outcome: Progressing     Problem: Safety - Adult  Goal: Free from fall injury  Outcome: Progressing     Problem: Pain  Goal: Verbalizes/displays adequate comfort level or baseline comfort level  Outcome: Progressing     Problem: Respiratory - Adult  Goal: Achieves optimal ventilation and oxygenation  Outcome: Progressing     Problem: Cardiovascular - Adult  Goal: Maintains optimal cardiac output and hemodynamic stability  Outcome: Progressing  Goal: Absence of cardiac dysrhythmias or at baseline  Outcome: Progressing     Problem: Skin/Tissue Integrity - Adult  Goal: Skin integrity remains intact  Description: 1.  Monitor for areas of redness and/or skin breakdown  2.  Assess vascular access sites hourly  3.  Every 4-6 hours minimum:  Change oxygen saturation probe site  4.  Every 4-6 hours:  If on nasal continuous positive airway pressure, respiratory therapy assess nares and determine need for appliance change or resting period  Outcome: Progressing  Goal: Oral mucous membranes remain intact  Outcome: Progressing     Problem: Musculoskeletal - Adult  Goal: Return mobility to safest level of function  Outcome: Progressing  Goal: Maintain proper alignment of affected body part  Outcome: Progressing  Goal: Return ADL status to a safe level of function  Outcome: Progressing     Problem: Gastrointestinal - Adult  Goal: Maintains or returns to baseline bowel function  Outcome: Progressing  Goal: Maintains adequate nutritional intake  Outcome: Progressing     Problem: Infection - Adult  Goal: Absence of infection at discharge  Outcome: Progressing     Problem: Metabolic/Fluid and Electrolytes - Adult  Goal: Electrolytes maintained within normal

## 2025-02-08 NOTE — PROGRESS NOTES
Patient in room resting, No shortness of breath noted. Oxygen therapy 4L. Patient status post seizure. No s/s of seizure activity noted and or reported at this time, seizure precautions in place. Patient continues with NG, low suction patent. Patient able to voice demands. Gown changed and linen changed. Patient able to voice all demands. All safety precautions in place

## 2025-02-08 NOTE — PROGRESS NOTES
RT Inhaler-Nebulizer Bronchodilator Protocol Note    There is a bronchodilator order in the chart from a provider indicating to follow the RT Bronchodilator Protocol and there is an “Initiate RT Inhaler-Nebulizer Bronchodilator Protocol” order as well (see protocol at bottom of note).    CXR Findings:  No results found.    The findings from the last RT Protocol Assessment were as follows:   History Pulmonary Disease: Chronic pulmonary disease  Respiratory Pattern: Dyspnea on exertion or RR 21-25 bpm  Breath Sounds: Inspiratory and expiratory or bilateral wheezing and/or rhonchi  Cough: Strong, spontaneous, non-productive  Indication for Bronchodilator Therapy: On home bronchodilators  Bronchodilator Assessment Score: 10    Aerosolized bronchodilator medication orders have been revised according to the RT Inhaler-Nebulizer Bronchodilator Protocol below.    Respiratory Therapist to perform RT Therapy Protocol Assessment initially then follow the protocol.  Repeat RT Therapy Protocol Assessment PRN for score 0-3 or on second treatment, BID, and PRN for scores above 3.    No Indications - adjust the frequency to every 6 hours PRN wheezing or bronchospasm, if no treatments needed after 48 hours then discontinue using Per Protocol order mode.     If indication present, adjust the RT bronchodilator orders based on the Bronchodilator Assessment Score as indicated below.  Use Inhaler orders unless patient has one or more of the following: on home nebulizer, not able to hold breath for 10 seconds, is not alert and oriented, cannot activate and use MDI correctly, or respiratory rate 25 breaths per minute or more, then use the equivalent nebulizer order(s) with same Frequency and PRN reasons based on the score.  If a patient is on this medication at home then do not decrease Frequency below that used at home.    0-3 - enter or revise RT bronchodilator order(s) to equivalent RT Bronchodilator order with Frequency of every 4

## 2025-02-08 NOTE — PROGRESS NOTES
Pt transferred from  for desat.  Pt arrived on NRB 15 L sat 100%.  NG was placed at 55 cm left nare & bridle.  Xray completed awaiting results but had gastric return.  VSS.    He is awake & alert to place & person.  He is asking for something to drink.  Pt placed on 5L NC with O2 100%

## 2025-02-09 LAB
ANION GAP SERPL CALCULATED.3IONS-SCNC: 9 MMOL/L (ref 3–16)
BUN SERPL-MCNC: 17 MG/DL (ref 7–20)
CALCIUM SERPL-MCNC: 8.1 MG/DL (ref 8.3–10.6)
CHLORIDE SERPL-SCNC: 102 MMOL/L (ref 99–110)
CO2 SERPL-SCNC: 36 MMOL/L (ref 21–32)
CREAT SERPL-MCNC: 0.7 MG/DL (ref 0.8–1.3)
DEPRECATED RDW RBC AUTO: 15.5 % (ref 12.4–15.4)
EKG ATRIAL RATE: 95 BPM
EKG DIAGNOSIS: NORMAL
EKG P AXIS: 74 DEGREES
EKG P-R INTERVAL: 210 MS
EKG Q-T INTERVAL: 368 MS
EKG QRS DURATION: 82 MS
EKG QTC CALCULATION (BAZETT): 462 MS
EKG R AXIS: -37 DEGREES
EKG T AXIS: 61 DEGREES
EKG VENTRICULAR RATE: 95 BPM
GFR SERPLBLD CREATININE-BSD FMLA CKD-EPI: >90 ML/MIN/{1.73_M2}
GLUCOSE BLD-MCNC: 109 MG/DL (ref 70–99)
GLUCOSE BLD-MCNC: 69 MG/DL (ref 70–99)
GLUCOSE BLD-MCNC: 73 MG/DL (ref 70–99)
GLUCOSE BLD-MCNC: 78 MG/DL (ref 70–99)
GLUCOSE BLD-MCNC: 80 MG/DL (ref 70–99)
GLUCOSE BLD-MCNC: 82 MG/DL (ref 70–99)
GLUCOSE BLD-MCNC: 92 MG/DL (ref 70–99)
GLUCOSE SERPL-MCNC: 67 MG/DL (ref 70–99)
HCT VFR BLD AUTO: 26.4 % (ref 40.5–52.5)
HGB BLD-MCNC: 8.8 G/DL (ref 13.5–17.5)
MAGNESIUM SERPL-MCNC: 1.58 MG/DL (ref 1.8–2.4)
MCH RBC QN AUTO: 31.5 PG (ref 26–34)
MCHC RBC AUTO-ENTMCNC: 33.4 G/DL (ref 31–36)
MCV RBC AUTO: 94.3 FL (ref 80–100)
PERFORMED ON: ABNORMAL
PERFORMED ON: ABNORMAL
PERFORMED ON: NORMAL
PLATELET # BLD AUTO: 319 K/UL (ref 135–450)
PMV BLD AUTO: 8.2 FL (ref 5–10.5)
POTASSIUM SERPL-SCNC: 3.4 MMOL/L (ref 3.5–5.1)
RBC # BLD AUTO: 2.8 M/UL (ref 4.2–5.9)
SODIUM SERPL-SCNC: 147 MMOL/L (ref 136–145)
WBC # BLD AUTO: 6.8 K/UL (ref 4–11)

## 2025-02-09 PROCEDURE — 93010 ELECTROCARDIOGRAM REPORT: CPT | Performed by: STUDENT IN AN ORGANIZED HEALTH CARE EDUCATION/TRAINING PROGRAM

## 2025-02-09 PROCEDURE — 6360000002 HC RX W HCPCS: Performed by: STUDENT IN AN ORGANIZED HEALTH CARE EDUCATION/TRAINING PROGRAM

## 2025-02-09 PROCEDURE — 6360000002 HC RX W HCPCS: Performed by: INTERNAL MEDICINE

## 2025-02-09 PROCEDURE — 2580000003 HC RX 258: Performed by: INTERNAL MEDICINE

## 2025-02-09 PROCEDURE — 6370000000 HC RX 637 (ALT 250 FOR IP): Performed by: SURGERY

## 2025-02-09 PROCEDURE — 36415 COLL VENOUS BLD VENIPUNCTURE: CPT

## 2025-02-09 PROCEDURE — 2000000000 HC ICU R&B

## 2025-02-09 PROCEDURE — 2500000003 HC RX 250 WO HCPCS: Performed by: INTERNAL MEDICINE

## 2025-02-09 PROCEDURE — 99233 SBSQ HOSP IP/OBS HIGH 50: CPT | Performed by: INTERNAL MEDICINE

## 2025-02-09 PROCEDURE — 94761 N-INVAS EAR/PLS OXIMETRY MLT: CPT

## 2025-02-09 PROCEDURE — 94640 AIRWAY INHALATION TREATMENT: CPT

## 2025-02-09 PROCEDURE — 2500000003 HC RX 250 WO HCPCS: Performed by: STUDENT IN AN ORGANIZED HEALTH CARE EDUCATION/TRAINING PROGRAM

## 2025-02-09 PROCEDURE — 6370000000 HC RX 637 (ALT 250 FOR IP): Performed by: INTERNAL MEDICINE

## 2025-02-09 PROCEDURE — 99233 SBSQ HOSP IP/OBS HIGH 50: CPT | Performed by: SURGERY

## 2025-02-09 PROCEDURE — 85027 COMPLETE CBC AUTOMATED: CPT

## 2025-02-09 PROCEDURE — 83735 ASSAY OF MAGNESIUM: CPT

## 2025-02-09 PROCEDURE — 80048 BASIC METABOLIC PNL TOTAL CA: CPT

## 2025-02-09 PROCEDURE — 2700000000 HC OXYGEN THERAPY PER DAY

## 2025-02-09 RX ORDER — ALBUTEROL SULFATE 0.83 MG/ML
2.5 SOLUTION RESPIRATORY (INHALATION)
Status: DISCONTINUED | OUTPATIENT
Start: 2025-02-09 | End: 2025-02-14

## 2025-02-09 RX ORDER — BISACODYL 10 MG
20 SUPPOSITORY, RECTAL RECTAL ONCE
Status: COMPLETED | OUTPATIENT
Start: 2025-02-09 | End: 2025-02-09

## 2025-02-09 RX ADMIN — POTASSIUM CHLORIDE 10 MEQ: 7.46 INJECTION, SOLUTION INTRAVENOUS at 14:45

## 2025-02-09 RX ADMIN — GABAPENTIN 800 MG: 400 CAPSULE ORAL at 17:42

## 2025-02-09 RX ADMIN — BUDESONIDE AND FORMOTEROL FUMARATE DIHYDRATE 2 PUFF: 160; 4.5 AEROSOL RESPIRATORY (INHALATION) at 07:43

## 2025-02-09 RX ADMIN — POTASSIUM CHLORIDE 10 MEQ: 7.46 INJECTION, SOLUTION INTRAVENOUS at 11:07

## 2025-02-09 RX ADMIN — SODIUM CHLORIDE: 4.5 INJECTION, SOLUTION INTRAVENOUS at 02:32

## 2025-02-09 RX ADMIN — METOPROLOL TARTRATE 5 MG: 5 INJECTION, SOLUTION INTRAVENOUS at 21:25

## 2025-02-09 RX ADMIN — PANTOPRAZOLE SODIUM 40 MG: 40 INJECTION, POWDER, LYOPHILIZED, FOR SOLUTION INTRAVENOUS at 11:06

## 2025-02-09 RX ADMIN — BISACODYL 20 MG: 10 SUPPOSITORY RECTAL at 11:11

## 2025-02-09 RX ADMIN — POTASSIUM CHLORIDE 10 MEQ: 7.46 INJECTION, SOLUTION INTRAVENOUS at 13:22

## 2025-02-09 RX ADMIN — LEVETIRACETAM 1000 MG: 100 INJECTION INTRAVENOUS at 08:13

## 2025-02-09 RX ADMIN — ENOXAPARIN SODIUM 40 MG: 100 INJECTION SUBCUTANEOUS at 08:12

## 2025-02-09 RX ADMIN — LEVETIRACETAM 1000 MG: 100 INJECTION INTRAVENOUS at 21:25

## 2025-02-09 RX ADMIN — GABAPENTIN 800 MG: 400 CAPSULE ORAL at 13:07

## 2025-02-09 RX ADMIN — DEXTROSE MONOHYDRATE 125 ML: 100 INJECTION, SOLUTION INTRAVENOUS at 20:05

## 2025-02-09 RX ADMIN — BUDESONIDE AND FORMOTEROL FUMARATE DIHYDRATE 2 PUFF: 160; 4.5 AEROSOL RESPIRATORY (INHALATION) at 19:40

## 2025-02-09 RX ADMIN — GABAPENTIN 800 MG: 400 CAPSULE ORAL at 08:12

## 2025-02-09 RX ADMIN — ALLOPURINOL 300 MG: 300 TABLET ORAL at 08:13

## 2025-02-09 RX ADMIN — GABAPENTIN 800 MG: 400 CAPSULE ORAL at 21:25

## 2025-02-09 RX ADMIN — GUAIFENESIN 200 MG: 100 SOLUTION ORAL at 21:25

## 2025-02-09 RX ADMIN — METOPROLOL TARTRATE 5 MG: 5 INJECTION, SOLUTION INTRAVENOUS at 11:06

## 2025-02-09 RX ADMIN — POTASSIUM CHLORIDE 10 MEQ: 7.46 INJECTION, SOLUTION INTRAVENOUS at 06:39

## 2025-02-09 RX ADMIN — ALBUTEROL SULFATE 2.5 MG: 2.5 SOLUTION RESPIRATORY (INHALATION) at 07:42

## 2025-02-09 RX ADMIN — METOPROLOL TARTRATE 5 MG: 5 INJECTION, SOLUTION INTRAVENOUS at 05:02

## 2025-02-09 RX ADMIN — ALBUTEROL SULFATE 2.5 MG: 0.83 SOLUTION RESPIRATORY (INHALATION) at 15:27

## 2025-02-09 RX ADMIN — Medication 5000 UNITS: at 08:12

## 2025-02-09 RX ADMIN — FERROUS SULFATE TAB 325 MG (65 MG ELEMENTAL FE) 325 MG: 325 (65 FE) TAB at 08:12

## 2025-02-09 RX ADMIN — ATORVASTATIN CALCIUM 40 MG: 40 TABLET, FILM COATED ORAL at 08:12

## 2025-02-09 RX ADMIN — SODIUM CHLORIDE, PRESERVATIVE FREE 10 ML: 5 INJECTION INTRAVENOUS at 21:20

## 2025-02-09 RX ADMIN — FERROUS SULFATE TAB 325 MG (65 MG ELEMENTAL FE) 325 MG: 325 (65 FE) TAB at 17:42

## 2025-02-09 RX ADMIN — METOPROLOL TARTRATE 5 MG: 5 INJECTION, SOLUTION INTRAVENOUS at 17:42

## 2025-02-09 RX ADMIN — ALBUTEROL SULFATE 2.5 MG: 0.83 SOLUTION RESPIRATORY (INHALATION) at 19:37

## 2025-02-09 RX ADMIN — SODIUM CHLORIDE, PRESERVATIVE FREE 10 ML: 5 INJECTION INTRAVENOUS at 08:13

## 2025-02-09 RX ADMIN — FOLIC ACID 1000 MCG: 1 TABLET ORAL at 08:12

## 2025-02-09 ASSESSMENT — PAIN SCALES - GENERAL: PAINLEVEL_OUTOF10: 0

## 2025-02-09 NOTE — PROGRESS NOTES
AM assessment complete, see flowsheet. Medications given per MAR. Pt agitated and restless. Alert to self only, intermittent to situation. Frequently pulling out oxygen and ripping off monitors and attempting to pull out IVs. Bilateral wrist restraints placed at this time.    VSS, NG tube remains connected to suction. Family updated on status. No other needs. Awaiting MD rounds.

## 2025-02-09 NOTE — PROGRESS NOTES
RT Inhaler-Nebulizer Bronchodilator Protocol Note    There is a bronchodilator order in the chart from a provider indicating to follow the RT Bronchodilator Protocol and there is an “Initiate RT Inhaler-Nebulizer Bronchodilator Protocol” order as well (see protocol at bottom of note).    CXR Findings:  XR CHEST PORTABLE    Result Date: 2/8/2025  1. Persistent mild blunting of left costophrenic angle which could be related to pleural thickening or trace pleural effusion.  There are no acute findings elsewhere in the chest. 2. Gastric tube in place which extends a short distance in the stomach with the side port 2-3 cm above the domingo.  Advancement is recommended.     XR CHEST PORTABLE    Result Date: 2/8/2025  1. Bibasilar atelectasis. 2. Possible trace left pleural effusion.       The findings from the last RT Protocol Assessment were as follows:   History Pulmonary Disease: Chronic pulmonary disease  Respiratory Pattern: Dyspnea on exertion or RR 21-25 bpm  Breath Sounds: Slightly diminished and/or crackles  Cough: Strong, spontaneous, non-productive  Indication for Bronchodilator Therapy: Decreased or absent breath sounds  Bronchodilator Assessment Score: 6    Aerosolized bronchodilator medication orders have been revised according to the RT Inhaler-Nebulizer Bronchodilator Protocol below.    Respiratory Therapist to perform RT Therapy Protocol Assessment initially then follow the protocol.  Repeat RT Therapy Protocol Assessment PRN for score 0-3 or on second treatment, BID, and PRN for scores above 3.    No Indications - adjust the frequency to every 6 hours PRN wheezing or bronchospasm, if no treatments needed after 48 hours then discontinue using Per Protocol order mode.     If indication present, adjust the RT bronchodilator orders based on the Bronchodilator Assessment Score as indicated below.  Use Inhaler orders unless patient has one or more of the following: on home nebulizer, not able to hold breath for

## 2025-02-09 NOTE — PROGRESS NOTES
Pt alert to self and knows he is in a hospital. Pt impulsive and gets confused at times. Tele sitter in place.  2 PIV's in place.  0.45 % NS infusing at 100 ml/hr. Vs S. NG tube bridled in Left nares at 60 cm. LWCS. ST on the monitor. Castrejon in place. Assessment complete as charted.  Repositioned for comfort.  Call light in reach. Denies needs.  Will continue to monitor.

## 2025-02-09 NOTE — PROGRESS NOTES
Physical Therapy  Will need new PT/OT orders due to transfer to ICU from [facility].    Malachi Palma PT, DPT [id number]

## 2025-02-09 NOTE — PROGRESS NOTES
MD paged.  Pt in ICU. Positive for Flu A also has Ileus. Meds through NG tube. Please change Toprol XL to I/V Metoprolol 10 mg Q 6 H, thats what is equivalent per pharmacy. He takes 50 mg BID. And his Mucinex to Liquid Guifenesin. Pt's -110.       MD changed it to I/V metoprolol 5 mg Q 6 H and liquid Guaifenesin.

## 2025-02-09 NOTE — PROGRESS NOTES
MD paged;  Pt admitted with Flu A and AResp. Failure and Ileus. NG to decompression. Pt NPO.Pt has Hx CHF and receiving 0.45%at 100 ml/hr. He has course crackles, Can I stop the I/V fluids?     0515: MD ordered to hold I/V fluids.

## 2025-02-09 NOTE — PROGRESS NOTES
Patient with decreased responsiveness at this time. Primary RN at bedside. Blood sugar 89. ABG obtained and sent to lab.

## 2025-02-09 NOTE — PROGRESS NOTES
4 Eyes Skin Assessment     NAME:  Sandor Early  YOB: 1955  MEDICAL RECORD NUMBER:  [id number]    The patient is being assessed for  Transfer to New Unit    I agree that at least one RN has performed a thorough Head to Toe Skin Assessment on the patient. ALL assessment sites listed below have been assessed.      Areas assessed by both nurses:    Head, Face, Ears, Shoulders, Back, Chest, Arms, Elbows, Hands, Sacrum. Buttock, Coccyx, Ischium, Legs. Feet and Heels, and Under Medical Devices         Does the Patient have a Wound? No noted wound(s)     Scattered bruising on bilateral arms.    Abraham Prevention initiated by RN: Yes  Wound Care Orders initiated by RN: Yes    Pressure Injury (Stage 3,4, Unstageable, DTI, NWPT, and Complex wounds) if present, place Wound referral order by RN under : No    New Ostomies, if present place, Ostomy referral order under : No     Nurse 1 eSignature: Electronically signed by Lois Baker RN on 2/9/25 at 4:24 AM EST    **SHARE this note so that the co-signing nurse can place an eSignature**    Nurse 2 eSignature: Electronically signed by Felicita Russell RN on 2/9/25 at 5:36 AM EST     Patient is not able to demonstrated the ability to move from a reclining position to an upright position within the recliner. Patient is confused, demented and /or unable to follow instruction.

## 2025-02-09 NOTE — PROGRESS NOTES
Pt pulled O2 off.  Pt had desat to 83% pt had eyes rolled back.  Placed pt on NRB and pt recovered and arousable.  Pt was placed back on 4L. At 100%.  Per nocturnist do not place on bipap has SBO & NG.  Call w/ABG.  Handoff given to Lois

## 2025-02-09 NOTE — PROGRESS NOTES
[facility] GENERAL SURGERY DAILY PROGRESS NOTE    SUBJECTIVE: Awake, alert.  No BM.      OBJECTIVE: CURRENT VITALS:  BP (!) 175/70   Pulse (!) 107   Temp 98.3 °F (36.8 °C) (Oral)   Resp 17   Ht 1.702 m (5' 7.01\")   Wt 83.8 kg (184 lb 11.9 oz)   SpO2 98%   BMI 28.93 kg/m²          ABD: Softer.  Mild diffuse tenderness. Some distention.     LABS:    CBC:   Recent Labs     02/08/25  0633 02/09/25  0446   WBC 7.9 6.8   RBC 3.13* 2.80*   HGB 9.9* 8.8*   HCT 29.8* 26.4*   MCV 95.4 94.3   RDW 15.7* 15.5*    319     BMP:   Recent Labs     02/07/25  1010 02/08/25  0633 02/09/25  0446    147* 147*   K 3.3* 4.1 3.4*    102 102   CO2 38* 33* 36*   PHOS  --  2.9  --    BUN 20 17 17   CREATININE 0.8 0.7* 0.7*     Recent Labs     02/07/25  1010 02/08/25  0633 02/09/25  0446   MG 1.78* 1.73* 1.58*        ASSESSMENT:   Influenza A  Ileus versus SBO       PLAN:   Continue current NPO, NG and supportive treatments.     OOB as tolerated.     Pulmonary toilet.     Will need to consider repeat imaging tomorrow if not improved.    Dulcolax suppository today.    Will also need to consider nutritional support soon.      LINDY DE SANTIAGO MD

## 2025-02-09 NOTE — PROGRESS NOTES
IM Progress Note    Admit Date:  1/30/2025  10    Interval history:  Influenza A and Acute respiratory failure   Was on bipap in iCU and improved, transferred to [facility]      Has NG placed for Ileus  , he pulled out NG and likely has aspirated became hypoxic and transferred to ICU     Seizure like event 2/7,   Remains confused  MRI brain incomplete     Subjective:  Mr. Early seen confused in bed  oriented to self only   , remains on 4 L   Desats when o2 removed  No BM   Getting meds via NG       Objective:   BP (!) 173/66   Pulse (!) 105   Temp 98.3 °F (36.8 °C) (Oral)   Resp 22   Ht 1.702 m (5' 7.01\")   Wt 83.8 kg (184 lb 11.9 oz)   SpO2 98%   BMI 28.93 kg/m²     Intake/Output Summary (Last 24 hours) at 2/9/2025 0737  Last data filed at 2/9/2025 0636  Gross per 24 hour   Intake 6619.74 ml   Output 1650 ml   Net 4969.74 ml       Physical Exam:        General:  elderly obese male, confused and in no distress  NG in place  Awake, alert and disoriented  Mucous Membranes:  Pink , anicteric  Neck: No JVD, no carotid bruit, no thyromegaly  Chest:  Clear to auscultation bilaterally diminished in bases with occasional wheeze  Cardiovascular:  RRR S1S2 heard, no murmurs or gallops  Abdomen:  full,  obese +distended, mild diffuse tenderness with guarding +  tender, no organomegaly, BS absent  Extremities: No edema or cyanosis. Distal pulses well felt  Neurological :remains slightly confused, no agitation but has restraints to prevent NG removal   Moving all ext    Medications:   Scheduled Medications:    levETIRAcetam  1,000 mg IntraVENous BID    metoprolol  5 mg IntraVENous Q6H    albuterol  2.5 mg Nebulization 4x Daily RT    [Held by provider] metoprolol succinate  50 mg Oral BID    sodium chloride flush  5-40 mL IntraVENous 2 times per day    enoxaparin  40 mg SubCUTAneous Daily    insulin lispro  0-4 Units SubCUTAneous 4x Daily AC & HS    gabapentin  800 mg Oral 4x Daily    dilTIAZem  180 mg Oral Daily

## 2025-02-09 NOTE — PROGRESS NOTES
Pulmonary Medicine Progress Note :                                                               CC: COPD and flu and SOB     Subjective:  Pleasantly confused  On home 4L o2 at baseline respiratory function  The pt is hypoxic when he pulls of his O2    PHYSICAL EXAM:  BP (!) 164/72   Pulse (!) 108   Temp 98.3 °F (36.8 °C) (Oral)   Resp 22   Ht 1.702 m (5' 7.01\")   Wt 83.8 kg (184 lb 11.9 oz)   SpO2 100%   BMI 28.93 kg/m²  4L  Constitutional:  No acute distress.   Eyes: PERRL. Conjunctivae anicteric.   ENT: Normal nose. Normal tongue.    Neck:  Trachea is midline.   Respiratory: No accessory muscle usage.   Decreased breath sounds. No wheezes. No rales. No Rhonchi.  Cardiovascular: Normal S1S2. No digit clubbing. No digit cyanosis. No LE edema.   Psychiatric: No anxiety or Agitation. Alert and Oriented to person only    LABS:  CBC:   Recent Labs     02/08/25  0633 02/09/25  0446   WBC 7.9 6.8   HGB 9.9* 8.8*   HCT 29.8* 26.4*   MCV 95.4 94.3    319     BMP:   Recent Labs     02/07/25  1010 02/08/25  0633 02/09/25  0446    147* 147*   K 3.3* 4.1 3.4*    102 102   CO2 38* 33* 36*   PHOS  --  2.9  --    BUN 20 17 17   CREATININE 0.8 0.7* 0.7*     LIVER PROFILE:   Recent Labs     02/08/25  2212   AST 33   ALT 13   BILIDIR 0.3   BILITOT 0.4   ALKPHOS 70       Flu +     Imaging:  Chest imaging was reviewed by me and showed 2/8/25 CXR: Unchanged blunting of the left costophrenic angle otherwise clear lungs    ASSESSMENT:  Acute COPD exacerbation  Chronic hypoxia on 4 L O2  Flu A +   Tobacco abuse  Ileus  ROCKWELL cirrhosis  Chronic diastolic dysfunction  DM2  PVD status post aortoiliac bpg  Patient has had several episodes of seizure-like activity that      PLAN:  Supplemental oxygen to maintain SaO2 >92%; wean as tolerated    Completed Tamiflu and zpak  Tobacco cessation counseling   Bronchodilators  Completed 5 days Prednisone   MIVF with 1/2 NS  N.p.o. and NG tube to

## 2025-02-09 NOTE — PLAN OF CARE
Problem: Chronic Conditions and Co-morbidities  Goal: Patient's chronic conditions and co-morbidity symptoms are monitored and maintained or improved  Outcome: Progressing  Flowsheets (Taken 2/8/2025 2353)  Care Plan - Patient's Chronic Conditions and Co-Morbidity Symptoms are Monitored and Maintained or Improved:   Monitor and assess patient's chronic conditions and comorbid symptoms for stability, deterioration, or improvement   Collaborate with multidisciplinary team to address chronic and comorbid conditions and prevent exacerbation or deterioration     Problem: Discharge Planning  Goal: Discharge to home or other facility with appropriate resources  Outcome: Progressing  Flowsheets (Taken 2/8/2025 2353)  Discharge to home or other facility with appropriate resources:   Identify barriers to discharge with patient and caregiver   Arrange for needed discharge resources and transportation as appropriate     Problem: Safety - Adult  Goal: Free from fall injury  Outcome: Progressing  Flowsheets (Taken 2/8/2025 2353)  Free From Fall Injury: Instruct family/caregiver on patient safety     Problem: Pain  Goal: Verbalizes/displays adequate comfort level or baseline comfort level  Outcome: Progressing  Flowsheets (Taken 2/8/2025 2353)  Verbalizes/displays adequate comfort level or baseline comfort level:   Encourage patient to monitor pain and request assistance   Assess pain using appropriate pain scale   Administer analgesics based on type and severity of pain and evaluate response     Problem: Respiratory - Adult  Goal: Achieves optimal ventilation and oxygenation  Outcome: Progressing  Flowsheets (Taken 2/8/2025 2353)  Achieves optimal ventilation and oxygenation:   Assess for changes in respiratory status   Assess for changes in mentation and behavior   Oxygen supplementation based on oxygen saturation or arterial blood gases   Position to facilitate oxygenation and minimize respiratory effort     Problem:

## 2025-02-10 ENCOUNTER — APPOINTMENT (OUTPATIENT)
Dept: GENERAL RADIOLOGY | Age: 70
DRG: 207 | End: 2025-02-10
Payer: MEDICARE

## 2025-02-10 PROBLEM — E87.0 HYPERNATREMIA: Status: ACTIVE | Noted: 2025-02-10

## 2025-02-10 PROBLEM — K56.7 ILEUS (HCC): Status: ACTIVE | Noted: 2025-02-10

## 2025-02-10 PROBLEM — R07.9 CHEST PAIN: Status: ACTIVE | Noted: 2025-02-10

## 2025-02-10 PROBLEM — E87.8 DISORDER OF ELECTROLYTES: Status: ACTIVE | Noted: 2025-02-10

## 2025-02-10 PROBLEM — R45.1 AGITATION: Status: ACTIVE | Noted: 2025-02-10

## 2025-02-10 LAB
ANION GAP SERPL CALCULATED.3IONS-SCNC: 13 MMOL/L (ref 3–16)
BUN SERPL-MCNC: 14 MG/DL (ref 7–20)
CALCIUM SERPL-MCNC: 8.1 MG/DL (ref 8.3–10.6)
CHLORIDE SERPL-SCNC: 105 MMOL/L (ref 99–110)
CHOLEST SERPL-MCNC: 75 MG/DL (ref 0–199)
CO2 SERPL-SCNC: 30 MMOL/L (ref 21–32)
CREAT SERPL-MCNC: 0.7 MG/DL (ref 0.8–1.3)
DEPRECATED RDW RBC AUTO: 16 % (ref 12.4–15.4)
EKG ATRIAL RATE: 129 BPM
EKG DIAGNOSIS: NORMAL
EKG P AXIS: 75 DEGREES
EKG P-R INTERVAL: 170 MS
EKG Q-T INTERVAL: 294 MS
EKG QRS DURATION: 80 MS
EKG QTC CALCULATION (BAZETT): 430 MS
EKG R AXIS: -58 DEGREES
EKG T AXIS: 64 DEGREES
EKG VENTRICULAR RATE: 129 BPM
GFR SERPLBLD CREATININE-BSD FMLA CKD-EPI: >90 ML/MIN/{1.73_M2}
GLUCOSE BLD-MCNC: 112 MG/DL (ref 70–99)
GLUCOSE BLD-MCNC: 146 MG/DL (ref 70–99)
GLUCOSE BLD-MCNC: 153 MG/DL (ref 70–99)
GLUCOSE BLD-MCNC: 169 MG/DL (ref 70–99)
GLUCOSE BLD-MCNC: 87 MG/DL (ref 70–99)
GLUCOSE BLD-MCNC: 91 MG/DL (ref 70–99)
GLUCOSE BLD-MCNC: 96 MG/DL (ref 70–99)
GLUCOSE SERPL-MCNC: 93 MG/DL (ref 70–99)
HCT VFR BLD AUTO: 27.2 % (ref 40.5–52.5)
HDLC SERPL-MCNC: 24 MG/DL (ref 40–60)
HGB BLD-MCNC: 9.1 G/DL (ref 13.5–17.5)
LDLC SERPL CALC-MCNC: 29 MG/DL
MAGNESIUM SERPL-MCNC: 1.58 MG/DL (ref 1.8–2.4)
MCH RBC QN AUTO: 32 PG (ref 26–34)
MCHC RBC AUTO-ENTMCNC: 33.5 G/DL (ref 31–36)
MCV RBC AUTO: 95.5 FL (ref 80–100)
PERFORMED ON: ABNORMAL
PERFORMED ON: NORMAL
PLATELET # BLD AUTO: 310 K/UL (ref 135–450)
PMV BLD AUTO: 7.9 FL (ref 5–10.5)
POTASSIUM SERPL-SCNC: 3.3 MMOL/L (ref 3.5–5.1)
RBC # BLD AUTO: 2.85 M/UL (ref 4.2–5.9)
SODIUM SERPL-SCNC: 148 MMOL/L (ref 136–145)
TRIGL SERPL-MCNC: 112 MG/DL (ref 0–150)
TROPONIN, HIGH SENSITIVITY: 22 NG/L (ref 0–22)
TROPONIN, HIGH SENSITIVITY: 25 NG/L (ref 0–22)
TSH SERPL DL<=0.005 MIU/L-ACNC: 3.19 UIU/ML (ref 0.27–4.2)
VLDLC SERPL CALC-MCNC: 22 MG/DL
WBC # BLD AUTO: 7.8 K/UL (ref 4–11)

## 2025-02-10 PROCEDURE — 6370000000 HC RX 637 (ALT 250 FOR IP): Performed by: INTERNAL MEDICINE

## 2025-02-10 PROCEDURE — 2000000000 HC ICU R&B

## 2025-02-10 PROCEDURE — 6360000002 HC RX W HCPCS: Performed by: INTERNAL MEDICINE

## 2025-02-10 PROCEDURE — 94640 AIRWAY INHALATION TREATMENT: CPT

## 2025-02-10 PROCEDURE — 85027 COMPLETE CBC AUTOMATED: CPT

## 2025-02-10 PROCEDURE — 99223 1ST HOSP IP/OBS HIGH 75: CPT | Performed by: STUDENT IN AN ORGANIZED HEALTH CARE EDUCATION/TRAINING PROGRAM

## 2025-02-10 PROCEDURE — 80061 LIPID PANEL: CPT

## 2025-02-10 PROCEDURE — 74019 RADEX ABDOMEN 2 VIEWS: CPT

## 2025-02-10 PROCEDURE — 2500000003 HC RX 250 WO HCPCS: Performed by: INTERNAL MEDICINE

## 2025-02-10 PROCEDURE — 2580000003 HC RX 258: Performed by: INTERNAL MEDICINE

## 2025-02-10 PROCEDURE — 6370000000 HC RX 637 (ALT 250 FOR IP): Performed by: SURGERY

## 2025-02-10 PROCEDURE — 99233 SBSQ HOSP IP/OBS HIGH 50: CPT | Performed by: INTERNAL MEDICINE

## 2025-02-10 PROCEDURE — 84484 ASSAY OF TROPONIN QUANT: CPT

## 2025-02-10 PROCEDURE — 6360000002 HC RX W HCPCS: Performed by: STUDENT IN AN ORGANIZED HEALTH CARE EDUCATION/TRAINING PROGRAM

## 2025-02-10 PROCEDURE — 84443 ASSAY THYROID STIM HORMONE: CPT

## 2025-02-10 PROCEDURE — 93010 ELECTROCARDIOGRAM REPORT: CPT | Performed by: INTERNAL MEDICINE

## 2025-02-10 PROCEDURE — 83735 ASSAY OF MAGNESIUM: CPT

## 2025-02-10 PROCEDURE — 80048 BASIC METABOLIC PNL TOTAL CA: CPT

## 2025-02-10 PROCEDURE — 94761 N-INVAS EAR/PLS OXIMETRY MLT: CPT

## 2025-02-10 PROCEDURE — 2500000003 HC RX 250 WO HCPCS: Performed by: STUDENT IN AN ORGANIZED HEALTH CARE EDUCATION/TRAINING PROGRAM

## 2025-02-10 PROCEDURE — 76937 US GUIDE VASCULAR ACCESS: CPT

## 2025-02-10 PROCEDURE — 93005 ELECTROCARDIOGRAM TRACING: CPT | Performed by: INTERNAL MEDICINE

## 2025-02-10 PROCEDURE — 36415 COLL VENOUS BLD VENIPUNCTURE: CPT

## 2025-02-10 PROCEDURE — 2700000000 HC OXYGEN THERAPY PER DAY

## 2025-02-10 PROCEDURE — 51798 US URINE CAPACITY MEASURE: CPT

## 2025-02-10 PROCEDURE — 99233 SBSQ HOSP IP/OBS HIGH 50: CPT | Performed by: SURGERY

## 2025-02-10 RX ORDER — DEXTROSE MONOHYDRATE 50 MG/ML
INJECTION, SOLUTION INTRAVENOUS CONTINUOUS
Status: DISCONTINUED | OUTPATIENT
Start: 2025-02-10 | End: 2025-02-16

## 2025-02-10 RX ORDER — LANOLIN ALCOHOL/MO/W.PET/CERES
100 CREAM (GRAM) TOPICAL DAILY
Status: DISCONTINUED | OUTPATIENT
Start: 2025-02-10 | End: 2025-02-11

## 2025-02-10 RX ORDER — QUETIAPINE FUMARATE 25 MG/1
25 TABLET, FILM COATED ORAL 2 TIMES DAILY
Status: DISCONTINUED | OUTPATIENT
Start: 2025-02-10 | End: 2025-02-12

## 2025-02-10 RX ORDER — BISACODYL 10 MG
20 SUPPOSITORY, RECTAL RECTAL ONCE
Status: COMPLETED | OUTPATIENT
Start: 2025-02-10 | End: 2025-02-10

## 2025-02-10 RX ORDER — METOPROLOL TARTRATE 1 MG/ML
5 INJECTION, SOLUTION INTRAVENOUS EVERY 4 HOURS
Status: DISCONTINUED | OUTPATIENT
Start: 2025-02-10 | End: 2025-02-10

## 2025-02-10 RX ORDER — LABETALOL HYDROCHLORIDE 5 MG/ML
10 INJECTION, SOLUTION INTRAVENOUS EVERY 6 HOURS PRN
Status: DISCONTINUED | OUTPATIENT
Start: 2025-02-10 | End: 2025-02-13

## 2025-02-10 RX ORDER — DEXMEDETOMIDINE HYDROCHLORIDE 4 UG/ML
.1-2 INJECTION, SOLUTION INTRAVENOUS CONTINUOUS
Status: DISCONTINUED | OUTPATIENT
Start: 2025-02-10 | End: 2025-02-15

## 2025-02-10 RX ORDER — METOPROLOL TARTRATE 1 MG/ML
2.5 INJECTION, SOLUTION INTRAVENOUS EVERY 6 HOURS
Status: DISCONTINUED | OUTPATIENT
Start: 2025-02-10 | End: 2025-02-13

## 2025-02-10 RX ORDER — MULTIVITAMIN WITH IRON
1 TABLET ORAL DAILY
Status: DISCONTINUED | OUTPATIENT
Start: 2025-02-10 | End: 2025-02-19

## 2025-02-10 RX ORDER — MUPIROCIN 20 MG/G
OINTMENT TOPICAL 2 TIMES DAILY
Status: COMPLETED | OUTPATIENT
Start: 2025-02-10 | End: 2025-02-14

## 2025-02-10 RX ADMIN — DEXMEDETOMIDINE HYDROCHLORIDE 0.9 MCG/KG/HR: 4 INJECTION, SOLUTION INTRAVENOUS at 20:21

## 2025-02-10 RX ADMIN — BISACODYL 20 MG: 10 SUPPOSITORY RECTAL at 13:30

## 2025-02-10 RX ADMIN — ATORVASTATIN CALCIUM 40 MG: 40 TABLET, FILM COATED ORAL at 07:58

## 2025-02-10 RX ADMIN — METOPROLOL TARTRATE 5 MG: 5 INJECTION, SOLUTION INTRAVENOUS at 04:44

## 2025-02-10 RX ADMIN — QUETIAPINE FUMARATE 25 MG: 25 TABLET ORAL at 13:30

## 2025-02-10 RX ADMIN — ALBUTEROL SULFATE 2.5 MG: 0.83 SOLUTION RESPIRATORY (INHALATION) at 20:08

## 2025-02-10 RX ADMIN — SODIUM CHLORIDE, PRESERVATIVE FREE 10 ML: 5 INJECTION INTRAVENOUS at 07:58

## 2025-02-10 RX ADMIN — Medication 100 MG: at 13:30

## 2025-02-10 RX ADMIN — FOLIC ACID 1000 MCG: 1 TABLET ORAL at 07:58

## 2025-02-10 RX ADMIN — INSULIN GLARGINE 15 UNITS: 100 INJECTION, SOLUTION SUBCUTANEOUS at 21:15

## 2025-02-10 RX ADMIN — ALLOPURINOL 300 MG: 300 TABLET ORAL at 07:58

## 2025-02-10 RX ADMIN — DULOXETINE 60 MG: 60 CAPSULE, DELAYED RELEASE ORAL at 07:58

## 2025-02-10 RX ADMIN — PANTOPRAZOLE SODIUM 40 MG: 40 INJECTION, POWDER, LYOPHILIZED, FOR SOLUTION INTRAVENOUS at 07:57

## 2025-02-10 RX ADMIN — FERROUS SULFATE TAB 325 MG (65 MG ELEMENTAL FE) 325 MG: 325 (65 FE) TAB at 17:56

## 2025-02-10 RX ADMIN — FERROUS SULFATE TAB 325 MG (65 MG ELEMENTAL FE) 325 MG: 325 (65 FE) TAB at 07:58

## 2025-02-10 RX ADMIN — BUDESONIDE AND FORMOTEROL FUMARATE DIHYDRATE 2 PUFF: 160; 4.5 AEROSOL RESPIRATORY (INHALATION) at 20:08

## 2025-02-10 RX ADMIN — GABAPENTIN 800 MG: 400 CAPSULE ORAL at 21:15

## 2025-02-10 RX ADMIN — POTASSIUM BICARBONATE 30 MEQ: 391 TABLET, EFFERVESCENT ORAL at 08:26

## 2025-02-10 RX ADMIN — GABAPENTIN 800 MG: 400 CAPSULE ORAL at 13:30

## 2025-02-10 RX ADMIN — POTASSIUM CHLORIDE 10 MEQ: 7.46 INJECTION, SOLUTION INTRAVENOUS at 06:23

## 2025-02-10 RX ADMIN — GABAPENTIN 800 MG: 400 CAPSULE ORAL at 07:58

## 2025-02-10 RX ADMIN — ALBUTEROL SULFATE 2.5 MG: 0.83 SOLUTION RESPIRATORY (INHALATION) at 07:05

## 2025-02-10 RX ADMIN — SODIUM CHLORIDE, PRESERVATIVE FREE 10 ML: 5 INJECTION INTRAVENOUS at 21:21

## 2025-02-10 RX ADMIN — LEVETIRACETAM 1000 MG: 100 INJECTION INTRAVENOUS at 21:14

## 2025-02-10 RX ADMIN — MUPIROCIN: 20 OINTMENT TOPICAL at 13:34

## 2025-02-10 RX ADMIN — MUPIROCIN: 20 OINTMENT TOPICAL at 21:15

## 2025-02-10 RX ADMIN — METOPROLOL TARTRATE 5 MG: 5 INJECTION, SOLUTION INTRAVENOUS at 13:30

## 2025-02-10 RX ADMIN — GABAPENTIN 800 MG: 400 CAPSULE ORAL at 17:56

## 2025-02-10 RX ADMIN — THERA TABS 1 TABLET: TAB at 13:30

## 2025-02-10 RX ADMIN — MAGNESIUM SULFATE HEPTAHYDRATE 2000 MG: 40 INJECTION, SOLUTION INTRAVENOUS at 09:44

## 2025-02-10 RX ADMIN — DEXTROSE MONOHYDRATE: 50 INJECTION, SOLUTION INTRAVENOUS at 13:37

## 2025-02-10 RX ADMIN — DILTIAZEM HYDROCHLORIDE 180 MG: 180 CAPSULE, COATED, EXTENDED RELEASE ORAL at 07:57

## 2025-02-10 RX ADMIN — Medication 5000 UNITS: at 07:58

## 2025-02-10 RX ADMIN — QUETIAPINE FUMARATE 25 MG: 25 TABLET ORAL at 21:24

## 2025-02-10 RX ADMIN — DEXMEDETOMIDINE HYDROCHLORIDE 1.1 MCG/KG/HR: 4 INJECTION, SOLUTION INTRAVENOUS at 15:25

## 2025-02-10 RX ADMIN — POTASSIUM CHLORIDE 10 MEQ: 7.46 INJECTION, SOLUTION INTRAVENOUS at 08:01

## 2025-02-10 RX ADMIN — TIOTROPIUM BROMIDE INHALATION SPRAY 2 PUFF: 3.12 SPRAY, METERED RESPIRATORY (INHALATION) at 07:08

## 2025-02-10 RX ADMIN — BUDESONIDE AND FORMOTEROL FUMARATE DIHYDRATE 2 PUFF: 160; 4.5 AEROSOL RESPIRATORY (INHALATION) at 07:05

## 2025-02-10 RX ADMIN — ALBUTEROL SULFATE 2.5 MG: 0.83 SOLUTION RESPIRATORY (INHALATION) at 12:54

## 2025-02-10 RX ADMIN — LEVETIRACETAM 1000 MG: 100 INJECTION INTRAVENOUS at 07:55

## 2025-02-10 RX ADMIN — DEXMEDETOMIDINE HYDROCHLORIDE 0.4 MCG/KG/HR: 4 INJECTION, SOLUTION INTRAVENOUS at 10:59

## 2025-02-10 RX ADMIN — ENOXAPARIN SODIUM 40 MG: 100 INJECTION SUBCUTANEOUS at 08:00

## 2025-02-10 ASSESSMENT — PAIN DESCRIPTION - FREQUENCY: FREQUENCY: CONTINUOUS

## 2025-02-10 ASSESSMENT — PAIN SCALES - GENERAL
PAINLEVEL_OUTOF10: 0
PAINLEVEL_OUTOF10: 10

## 2025-02-10 ASSESSMENT — PAIN DESCRIPTION - ORIENTATION: ORIENTATION: MID

## 2025-02-10 ASSESSMENT — PAIN DESCRIPTION - PAIN TYPE: TYPE: ACUTE PAIN

## 2025-02-10 ASSESSMENT — PAIN - FUNCTIONAL ASSESSMENT: PAIN_FUNCTIONAL_ASSESSMENT: ACTIVITIES ARE NOT PREVENTED

## 2025-02-10 ASSESSMENT — PAIN DESCRIPTION - LOCATION: LOCATION: CHEST

## 2025-02-10 ASSESSMENT — PAIN DESCRIPTION - DIRECTION: RADIATING_TOWARDS: DENIED

## 2025-02-10 ASSESSMENT — PAIN DESCRIPTION - ONSET: ONSET: ON-GOING

## 2025-02-10 ASSESSMENT — PAIN DESCRIPTION - DESCRIPTORS: DESCRIPTORS: ACHING

## 2025-02-10 NOTE — PROGRESS NOTES
Pulmonary Medicine Progress Note :                                                               CC: COPD and flu and SOB     Subjective:  4L- uses 4 at home   No drips       Intake/Output Summary (Last 24 hours) at 2/10/2025 1023  Last data filed at 2/10/2025 0336  Gross per 24 hour   Intake 667.7 ml   Output 1300 ml   Net -632.3 ml         PHYSICAL EXAM:  BP (!) 147/82   Pulse (!) 122   Temp 98 °F (36.7 °C) (Oral)   Resp 20   Ht 1.702 m (5' 7.01\")   Wt 82.6 kg (182 lb 1.6 oz)   SpO2 98%   BMI 28.51 kg/m²  4L  Constitutional:  No acute distress.   Eyes: PERRL. Conjunctivae anicteric.   ENT: Normal nose. Normal tongue.    Neck:  Trachea is midline.   Respiratory: No accessory muscle usage.   Decreased breath sounds. Few wheezes. No rales. No Rhonchi.  Cardiovascular: Normal S1S2. No digit clubbing. No digit cyanosis. No LE edema.   Psychiatric: No anxiety or Agitation. Alert and Oriented to person only    LABS:  CBC:   Recent Labs     02/08/25  0633 02/09/25  0446 02/10/25  0438   WBC 7.9 6.8 7.8   HGB 9.9* 8.8* 9.1*   HCT 29.8* 26.4* 27.2*   MCV 95.4 94.3 95.5    319 310     BMP:   Recent Labs     02/08/25  0633 02/09/25  0446 02/10/25  0438   * 147* 148*   K 4.1 3.4* 3.3*    102 105   CO2 33* 36* 30   PHOS 2.9  --   --    BUN 17 17 14   CREATININE 0.7* 0.7* 0.7*     LIVER PROFILE:   Recent Labs     02/08/25  2212   AST 33   ALT 13   BILIDIR 0.3   BILITOT 0.4   ALKPHOS 70     Microbiology:  Flu +     Imaging:  CXR 2/8 images independently reviewed by me and showed:  No acute cardiopulmonary disease     ASSESSMENT:  Acute COPD exacerbation  Chronic hypoxia on 4 L O2  Hypernatremia   Flu A + 1/30  Electrolytes disorder   Tobacco abuse  Ileus  Agitation   ROCKWELL cirrhosis  Chronic diastolic dysfunction  DM2  PVD status post aortoiliac bpg  Patient has had several episodes of seizure-like activity   Alcohol use/abuse 3 beer/day       PLAN:  Supplemental oxygen to

## 2025-02-10 NOTE — PLAN OF CARE
Problem: Chronic Conditions and Co-morbidities  Goal: Patient's chronic conditions and co-morbidity symptoms are monitored and maintained or improved  Outcome: Progressing  Flowsheets (Taken 2/9/2025 2351)  Care Plan - Patient's Chronic Conditions and Co-Morbidity Symptoms are Monitored and Maintained or Improved: Monitor and assess patient's chronic conditions and comorbid symptoms for stability, deterioration, or improvement     Problem: Discharge Planning  Goal: Discharge to home or other facility with appropriate resources  Outcome: Progressing  Flowsheets (Taken 2/9/2025 2351)  Discharge to home or other facility with appropriate resources:   Identify barriers to discharge with patient and caregiver   Arrange for needed discharge resources and transportation as appropriate     Problem: Safety - Adult  Goal: Free from fall injury  Outcome: Progressing  Flowsheets (Taken 2/9/2025 2351)  Free From Fall Injury: Instruct family/caregiver on patient safety     Problem: Pain  Goal: Verbalizes/displays adequate comfort level or baseline comfort level  Outcome: Progressing  Flowsheets (Taken 2/9/2025 2351)  Verbalizes/displays adequate comfort level or baseline comfort level:   Encourage patient to monitor pain and request assistance   Assess pain using appropriate pain scale   Implement non-pharmacological measures as appropriate and evaluate response   Administer analgesics based on type and severity of pain and evaluate response     Problem: Respiratory - Adult  Goal: Achieves optimal ventilation and oxygenation  Outcome: Progressing  Flowsheets (Taken 2/9/2025 2351)  Achieves optimal ventilation and oxygenation:   Assess for changes in respiratory status   Assess for changes in mentation and behavior   Oxygen supplementation based on oxygen saturation or arterial blood gases   Position to facilitate oxygenation and minimize respiratory effort     Problem: Cardiovascular - Adult  Goal: Maintains optimal cardiac  Administer electrolyte replacement as ordered   Monitor response to electrolyte replacements, including repeat lab results as appropriate  Goal: Glucose maintained within prescribed range  Outcome: Progressing  Flowsheets (Taken 2/9/2025 2351)  Glucose maintained within prescribed range:   Monitor blood glucose as ordered   Assess for signs and symptoms of hyperglycemia and hypoglycemia     Problem: Skin/Tissue Integrity  Goal: Skin integrity remains intact  Description: 1.  Monitor for areas of redness and/or skin breakdown  2.  Assess vascular access sites hourly  3.  Every 4-6 hours minimum:  Change oxygen saturation probe site  4.  Every 4-6 hours:  If on nasal continuous positive airway pressure, respiratory therapy assess nares and determine need for appliance change or resting period  Outcome: Progressing  Flowsheets  Taken 2/9/2025 2351  Skin Integrity Remains Intact:   Monitor for areas of redness and/or skin breakdown   Every 4-6 hours minimum: Change oxygen saturation probe site  Taken 2/9/2025 2203  Skin Integrity Remains Intact:   Monitor for areas of redness and/or skin breakdown   Every 4-6 hours minimum: Change oxygen saturation probe site     Problem: Nutrition Deficit:  Goal: Optimize nutritional status  Outcome: Progressing  Flowsheets (Taken 2/9/2025 2351)  Nutrient intake appropriate for improving, restoring, or maintaining nutritional needs:   Assess nutritional status and recommend course of action   Monitor oral intake, labs, and treatment plans     Problem: Confusion  Goal: Confusion, delirium, dementia, or psychosis is improved or at baseline  Description: INTERVENTIONS:  1. Assess for possible contributors to thought disturbance, including medications, impaired vision or hearing, underlying metabolic abnormalities, dehydration, psychiatric diagnoses, and notify attending LIP  2. [place] high risk fall precautions, as indicated  3. Provide frequent short contacts to provide reality

## 2025-02-10 NOTE — CONSULTS
CARDIOLOGY CONSULTATION        Patient Name: Sandor Early  Date of admission: 1/30/2025  5:28 PM  Admission Dx: Hypomagnesemia [E83.42]  Influenza A [J10.1]  COPD exacerbation (HCC) [J44.1]  Respiratory failure with hypoxia [J96.91]  Alcohol use disorder [F10.90]  Acute on chronic respiratory failure with hypoxia and hypercapnia [J96.21, J96.22]  Acute respiratory failure [J96.00]  Requesting Physician: Luda Richey MD  Primary Care physician: Juliet Mayorga MD    Reason for Consultation/Chief Complaint:     History of Present Illness:     Sandor Early is a 69 y.o. patient with past medical history of hepatic cirrhosis, hypertension, hyperlipidemia, diabetes, peripheral neuropathy, history of bladder cancer status post removal, who presented to the hospital with complaints of shortness of breath, chest pain.  Patient presented to the [facility] ED for shortness of breath which has been going on for several weeks worse in the past few days.  He was approximately in severe respiratory distress while there.  He was placed on BiPAP and transferred to the ICU at [facility].  He has been diagnosed is being treated for acute on chronic epoxy can hypercapnic respiratory failure secondary to influenza A and COPD exacerbation.  He is also had a partial small bowel obstruction versus ileus.  Surgery following plans to repeat serial imaging if not improved plan for surgery next week.  Patient also has acute metabolic encephalopathy thought to be secondary to combination of recent steroids, influenza A and hypoxia.  CT head reportedly negative.  Patient also with focal seizure involving left upper extremity.  Started on Keppra by neurology, EEG with mild generalized slowing.  MRI incomplete with motion artifact.    Patient encephalopathic.  Does not awaken name or verbal commands.  Did not respond to painful stimuli.  Approximate 10 seconds after painful stimuli patient did arouse but is not coherent to have a

## 2025-02-10 NOTE — PROGRESS NOTES
Updated Dr. Romero about the patients troponin. Orders for the patient to get a cardiology consult. Electronically signed by Leobardo Majano RN on 2/10/2025 at 12:26 PM

## 2025-02-10 NOTE — PROGRESS NOTES
4 Eyes Skin Assessment     NAME:  Sandor Early  YOB: 1955  MEDICAL RECORD NUMBER:  [id number]    The patient is being assessed for  Shift Handoff    I agree that at least one RN has performed a thorough Head to Toe Skin Assessment on the patient. ALL assessment sites listed below have been assessed.      Areas assessed by both nurses:    Head, Face, Ears, Shoulders, Back, Chest, Arms, Elbows, Hands, Sacrum. Buttock, Coccyx, Ischium, Legs. Feet and Heels, and Under Medical Devices         Does the Patient have a Wound? No noted wound(s)       Abraham Prevention initiated by RN: Yes  Wound Care Orders initiated by RN: Yes    Pressure Injury (Stage 3,4, Unstageable, DTI, NWPT, and Complex wounds) if present, place Wound referral order by RN under : No    New Ostomies, if present place, Ostomy referral order under : No     Nurse 1 eSignature: Electronically signed by Lois Baker RN on 2/10/25 at 5:05 AM EST    **SHARE this note so that the co-signing nurse can place an eSignature**    Nurse 2 eSignature: Electronically signed by Catia Rico RN on 2/10/25 at 5:24 AM EST     Patient is not able to demonstrated the ability to move from a reclining position to an upright position within the recliner. however patient is alert, oriented and able to provide informed consent

## 2025-02-10 NOTE — PROGRESS NOTES
RT Inhaler-Nebulizer Bronchodilator Protocol Note    There is a bronchodilator order in the chart from a provider indicating to follow the RT Bronchodilator Protocol and there is an “Initiate RT Inhaler-Nebulizer Bronchodilator Protocol” order as well (see protocol at bottom of note).    CXR Findings:  XR CHEST PORTABLE    Result Date: 2/8/2025  1. Persistent mild blunting of left costophrenic angle which could be related to pleural thickening or trace pleural effusion.  There are no acute findings elsewhere in the chest. 2. Gastric tube in place which extends a short distance in the stomach with the side port 2-3 cm above the domingo.  Advancement is recommended.     XR CHEST PORTABLE    Result Date: 2/8/2025  1. Bibasilar atelectasis. 2. Possible trace left pleural effusion.       The findings from the last RT Protocol Assessment were as follows:   History Pulmonary Disease: (P) Chronic pulmonary disease  Respiratory Pattern: (P) Dyspnea on exertion or RR 21-25 bpm  Breath Sounds: (P) Slightly diminished and/or crackles  Cough: (P) Strong, spontaneous, non-productive  Indication for Bronchodilator Therapy: (P) Decreased or absent breath sounds  Bronchodilator Assessment Score: (P) 6    Aerosolized bronchodilator medication orders have been revised according to the RT Inhaler-Nebulizer Bronchodilator Protocol below.    Respiratory Therapist to perform RT Therapy Protocol Assessment initially then follow the protocol.  Repeat RT Therapy Protocol Assessment PRN for score 0-3 or on second treatment, BID, and PRN for scores above 3.    No Indications - adjust the frequency to every 6 hours PRN wheezing or bronchospasm, if no treatments needed after 48 hours then discontinue using Per Protocol order mode.     If indication present, adjust the RT bronchodilator orders based on the Bronchodilator Assessment Score as indicated below.  Use Inhaler orders unless patient has one or more of the following: on home nebulizer, not

## 2025-02-10 NOTE — PROGRESS NOTES
[facility] Vascular Access  Ultrasound Guided Peripheral Insertion Procedure Note.     Sandor Early   Admitted- 1/30/2025  5:28 PM  Admission diagnosis- Hypomagnesemia [E83.42]  Influenza A [J10.1]  COPD exacerbation (HCC) [J44.1]  Respiratory failure with hypoxia [J96.91]  Alcohol use disorder [F10.90]  Acute on chronic respiratory failure with hypoxia and hypercapnia [J96.21, J96.22]  Acute respiratory failure [J96.00]      Attending Physician- Drake Jalloh*  Ordering Physician- Dr. Hillary Jalloh  Indication for Insertion: Limited Access          USG PIV placed to right upper arm using sterile technique. Brisk blood return noted, line flushes with ease. Patient tolerated well. Bed returned to lowest position, call light within reach. See US images below.

## 2025-02-10 NOTE — PROGRESS NOTES
[facility] GENERAL SURGERY DAILY PROGRESS NOTE    SUBJECTIVE: Awake, alert.  Had some BMs.    OBJECTIVE: CURRENT VITALS:  BP (!) 163/78   Pulse (!) 122   Temp 98 °F (36.7 °C) (Oral)   Resp 20   Ht 1.702 m (5' 7.01\")   Wt 82.6 kg (182 lb 1.6 oz)   SpO2 98%   BMI 28.51 kg/m²          ABD: Soft.  Tender R side but improved.     LABS:    CBC:   Recent Labs     02/08/25  0633 02/09/25  0446 02/10/25  0438   WBC 7.9 6.8 7.8   RBC 3.13* 2.80* 2.85*   HGB 9.9* 8.8* 9.1*   HCT 29.8* 26.4* 27.2*   MCV 95.4 94.3 95.5   RDW 15.7* 15.5* 16.0*    319 310     BMP:   Recent Labs     02/08/25  0633 02/09/25  0446 02/10/25  0438   * 147* 148*   K 4.1 3.4* 3.3*    102 105   CO2 33* 36* 30   PHOS 2.9  --   --    BUN 17 17 14   CREATININE 0.7* 0.7* 0.7*     Recent Labs     02/08/25 0633 02/09/25 0446 02/10/25  0438   MG 1.73* 1.58* 1.58*       XRAYS: Pending      ASSESSMENT:   Influenza A  Ileus versus SBO       PLAN:   Continue current NPO, NG and supportive treatments.  AXR ordered this morning as follow up.  Good sign that he is having BMs.      OOB as tolerated.     Pulmonary toilet.     Will assess if he can trial liquids after reviewing AXR.     LINDY DE SANTIAGO MD

## 2025-02-10 NOTE — PROGRESS NOTES
Bilateral soft wrist restraints initiated. Pt getting agitated. Pulling on NG tube, oxygen. Grabbing the staff and yelling he wants to drink water. Dr. Kerr aware.

## 2025-02-10 NOTE — PROGRESS NOTES
Neurology Progress Note    ID: Sandor Early is a 69 y.o. male    : 1955     LOS: 11 days     ASSESSMENT    Principal Problem:    Respiratory failure with hypoxia  Active Problems:    Seizure (HCC)    Tobacco abuse    Acute on chronic respiratory failure with hypoxia and hypercapnia    Diabetes mellitus (HCC)    Acute respiratory failure    Influenza A    Alcohol use disorder    Hypomagnesemia    SBO (small bowel obstruction) (HCC)    Acute encephalopathy  Resolved Problems:    * No resolved hospital problems. *    The patient remains altered without focal neurological deficits.  CT brain showed generalized brain atrophy.  EEG showed moderate encephalopathy.    Based on the history, the pathology of seizure-like activity is unclear.  The history of bilateral shaking with retained awareness is not typical for epileptic event.  The patient remains on levetiracetam 1000 mg twice daily.    The patient remains on Precedex.    PLAN    1.  Continue levetiracetam 1000 mg IV twice daily for now.  2.  Seizure precaution.  3.  Treatment of infection per primary team.  4.  Ativan 2 mg as needed for breakthrough seizure.  5.  Minimize sedation and anticoagulant medication.  Neurology recommends to hold gabapentin if there is no clear active indication.  6.  Follow-up EEG tomorrow.      Medications:  Scheduled Meds:    mupirocin   Each Nostril BID    metoprolol  5 mg IntraVENous Q4H    QUEtiapine  25 mg Oral BID    thiamine  100 mg Oral Daily    multivitamin  1 tablet Oral Daily    pantoprazole (PROTONIX) 40 mg in sodium chloride (PF) 0.9 % 10 mL injection  40 mg IntraVENous Daily    albuterol  2.5 mg Nebulization TID RT    levETIRAcetam  1,000 mg IntraVENous BID    [Held by provider] metoprolol succinate  50 mg Oral BID    sodium chloride flush  5-40 mL IntraVENous 2 times per day    enoxaparin  40 mg SubCUTAneous Daily    insulin lispro  0-4 Units SubCUTAneous 4x Daily AC & HS    gabapentin  800 mg Oral 4x Daily

## 2025-02-10 NOTE — FLOWSHEET NOTE
02/10/25 1010   Pain Assessment   Pain Assessment 0-10   Pain Level 10   Pain Location Chest   Pain Orientation Mid   Pain Descriptors Aching   Functional Pain Assessment Activities are not prevented   Pain Type Acute pain   Pain Radiating Towards denied   Pain Frequency Continuous   Pain Onset On-going     Patient complaining of chest pain as 10/10. It is aching and not radiating. 12 lead obtained tropoin obtained. Electronically signed by Leobardo Majano RN on 2/10/2025 at 11:18 AM

## 2025-02-10 NOTE — PROGRESS NOTES
Pt alert to self and oriented to place. Pt impulsive and gets confused at times. Tele sitter in place.  2 PIV's in place.  ST on the monitor. . NG tube bridled in Left nares at 60 cm. LWIS. Castrejon in place. Bilateral soft restraints discontinued. Assessment complete as charted.  Repositioned for comfort.  Call light in reach. Denies needs.  Will continue to monitor.

## 2025-02-10 NOTE — PLAN OF CARE
Problem: Safety - Medical Restraint  Goal: Remains free of injury from restraints (Restraint for Interference with Medical Device)  Description: INTERVENTIONS:  1. Determine that other, less restrictive measures have been tried or would not be effective before applying the restraint  2. Evaluate the patient's condition at the time of restraint application  3. Inform patient/family regarding the reason for restraint  4. Q2H: Monitor safety, psychosocial status, comfort, nutrition and hydration  Outcome: Progressing  Flowsheets  Taken 2/10/2025 0045  Remains free of injury from restraints (restraint for interference with medical device): Every 2 hours: Monitor safety, psychosocial status, comfort, nutrition and hydration  Taken 2/9/2025 2200  Remains free of injury from restraints (restraint for interference with medical device): Every 2 hours: Monitor safety, psychosocial status, comfort, nutrition and hydration  Taken 2/9/2025 2000  Remains free of injury from restraints (restraint for interference with medical device): Every 2 hours: Monitor safety, psychosocial status, comfort, nutrition and hydration

## 2025-02-10 NOTE — PROGRESS NOTES
D: Patient is lying in bed. He is oriented to person and place. Disoriented to situation and time. He has repeatedly asked why he is here. Explained that he he had a blockage in his bowel. He seemed to forget this and asked again a few minutes later why he is here. He then stated that he is going to go home explained that he is in the hospital and stated, \"ok ill stay.\" A few minutes later the virtual sitter put the alarm on because the patient had pulled all of his lines and was pulling on the NG tube. Patient was stopped from pulling his NG tube out and he became belligerent and stated, \"If I had a gun I would fucking shoot you dead.\" Restraints applied to protect the the patients lines. Updated Dr. Romero. Orders for precedex obtained. Electronically signed by Leobardo Majano RN on 2/10/2025 at 11:13 AM

## 2025-02-10 NOTE — PROGRESS NOTES
Speech Language Pathology  Attempt Note     Name: Sandor Early  : 1955  Medical Diagnosis: Hypomagnesemia [E83.42]  Influenza A [J10.1]  COPD exacerbation (HCC) [J44.1]  Respiratory failure with hypoxia [J96.91]  Alcohol use disorder [F10.90]  Acute on chronic respiratory failure with hypoxia and hypercapnia [J96.21, J96.22]  Acute respiratory failure [J96.00]      SLP attempted to see pt for dysphagia tx. Will need new ST orders if warranted due to transfer to ICU from [facility]. RN aware. No charges.    Thank you,    Tayla Shelby M.A. SLP  Speech-Language Pathologist  [place] Lic #[id number]  [phone]

## 2025-02-11 ENCOUNTER — APPOINTMENT (OUTPATIENT)
Age: 70
DRG: 207 | End: 2025-02-11
Attending: STUDENT IN AN ORGANIZED HEALTH CARE EDUCATION/TRAINING PROGRAM
Payer: MEDICARE

## 2025-02-11 PROBLEM — F10.931 ALCOHOL WITHDRAWAL SYNDROME, WITH DELIRIUM (HCC): Status: ACTIVE | Noted: 2025-02-11

## 2025-02-11 LAB
ANION GAP SERPL CALCULATED.3IONS-SCNC: 10 MMOL/L (ref 3–16)
BASOPHILS # BLD: 0 K/UL (ref 0–0.2)
BASOPHILS NFR BLD: 0.2 %
BUN SERPL-MCNC: 16 MG/DL (ref 7–20)
CALCIUM SERPL-MCNC: 7.4 MG/DL (ref 8.3–10.6)
CHLORIDE SERPL-SCNC: 105 MMOL/L (ref 99–110)
CO2 SERPL-SCNC: 29 MMOL/L (ref 21–32)
CREAT SERPL-MCNC: 0.7 MG/DL (ref 0.8–1.3)
DEPRECATED RDW RBC AUTO: 15.7 % (ref 12.4–15.4)
ECHO AO ROOT DIAM: 3.3 CM
ECHO AO ROOT INDEX: 1.7 CM/M2
ECHO AV MEAN GRADIENT: 6 MMHG
ECHO AV MEAN GRADIENT: 6 MMHG
ECHO AV MEAN VELOCITY: 1.1 M/S
ECHO AV PEAK GRADIENT: 11 MMHG
ECHO AV PEAK VELOCITY: 1.7 M/S
ECHO AV VELOCITY RATIO: 0.59
ECHO AV VTI: 27.3 CM
ECHO BSA: 1.98 M2
ECHO EST RA PRESSURE: 3 MMHG
ECHO IVC EXP: 2 CM
ECHO IVC INSP: 0.8 CM
ECHO LA AREA 2C: 16 CM2
ECHO LA AREA 4C: 16.3 CM2
ECHO LA MAJOR AXIS: 5.4 CM
ECHO LA MINOR AXIS: 4.9 CM
ECHO LA VOL BP: 41 ML (ref 18–58)
ECHO LA VOL MOD A2C: 40 ML (ref 18–58)
ECHO LA VOL MOD A4C: 39 ML (ref 18–58)
ECHO LA VOL/BSA BIPLANE: 21 ML/M2 (ref 16–34)
ECHO LA VOLUME INDEX MOD A2C: 21 ML/M2 (ref 16–34)
ECHO LA VOLUME INDEX MOD A4C: 20 ML/M2 (ref 16–34)
ECHO LV E' LATERAL VELOCITY: 7.29 CM/S
ECHO LV E' SEPTAL VELOCITY: 6.96 CM/S
ECHO LV EF PHYSICIAN: 55 %
ECHO LV FRACTIONAL SHORTENING: 41 % (ref 28–44)
ECHO LV INTERNAL DIMENSION DIASTOLE INDEX: 2.11 CM/M2
ECHO LV INTERNAL DIMENSION DIASTOLIC: 4.1 CM (ref 4.2–5.9)
ECHO LV INTERNAL DIMENSION SYSTOLIC INDEX: 1.24 CM/M2
ECHO LV INTERNAL DIMENSION SYSTOLIC: 2.4 CM
ECHO LV ISOVOLUMETRIC RELAXATION TIME (IVRT): 77 MS
ECHO LV IVSD: 1.2 CM (ref 0.6–1)
ECHO LV MASS 2D: 182.5 G (ref 88–224)
ECHO LV MASS INDEX 2D: 94 G/M2 (ref 49–115)
ECHO LV POSTERIOR WALL DIASTOLIC: 1.3 CM (ref 0.6–1)
ECHO LV RELATIVE WALL THICKNESS RATIO: 0.63
ECHO LVOT AV VTI INDEX: 0.65
ECHO LVOT MEAN GRADIENT: 2 MMHG
ECHO LVOT PEAK GRADIENT: 4 MMHG
ECHO LVOT PEAK VELOCITY: 1 M/S
ECHO LVOT VTI: 17.7 CM
ECHO MV A VELOCITY: 1.07 M/S
ECHO MV E DECELERATION TIME (DT): 201 MS
ECHO MV E VELOCITY: 0.72 M/S
ECHO MV E/A RATIO: 0.67
ECHO MV E/E' LATERAL: 9.88
ECHO MV E/E' RATIO (AVERAGED): 10.11
ECHO MV E/E' SEPTAL: 10.34
ECHO MV LVOT VTI INDEX: 1.03
ECHO MV MAX VELOCITY: 1.1 M/S
ECHO MV MEAN GRADIENT: 2 MMHG
ECHO MV MEAN VELOCITY: 0.7 M/S
ECHO MV PEAK GRADIENT: 5 MMHG
ECHO MV VTI: 18.3 CM
ECHO PV MAX VELOCITY: 1.3 M/S
ECHO PV MEAN GRADIENT: 4 MMHG
ECHO PV MEAN VELOCITY: 0.9 M/S
ECHO PV PEAK GRADIENT: 7 MMHG
ECHO PV VTI: 22.9 CM
ECHO RA AREA 4C: 21.6 CM2
ECHO RA END SYSTOLIC VOLUME APICAL 4 CHAMBER INDEX BSA: 40 ML/M2
ECHO RA VOLUME: 78 ML
ECHO RV BASAL DIMENSION: 4.2 CM
ECHO RV FREE WALL PEAK S': 12.2 CM/S
ECHO RV LONGITUDINAL DIMENSION: 8.6 CM
ECHO RV MID DIMENSION: 4.1 CM
ECHO RV TAPSE: 1.9 CM (ref 1.7–?)
EOSINOPHIL # BLD: 0.1 K/UL (ref 0–0.6)
EOSINOPHIL NFR BLD: 0.9 %
GFR SERPLBLD CREATININE-BSD FMLA CKD-EPI: >90 ML/MIN/{1.73_M2}
GLUCOSE BLD-MCNC: 122 MG/DL (ref 70–99)
GLUCOSE BLD-MCNC: 127 MG/DL (ref 70–99)
GLUCOSE BLD-MCNC: 134 MG/DL (ref 70–99)
GLUCOSE BLD-MCNC: 137 MG/DL (ref 70–99)
GLUCOSE BLD-MCNC: 142 MG/DL (ref 70–99)
GLUCOSE BLD-MCNC: 149 MG/DL (ref 70–99)
GLUCOSE SERPL-MCNC: 128 MG/DL (ref 70–99)
HCT VFR BLD AUTO: 24.5 % (ref 40.5–52.5)
HGB BLD-MCNC: 8.2 G/DL (ref 13.5–17.5)
LYMPHOCYTES # BLD: 0.6 K/UL (ref 1–5.1)
LYMPHOCYTES NFR BLD: 7.4 %
MCH RBC QN AUTO: 31.6 PG (ref 26–34)
MCHC RBC AUTO-ENTMCNC: 33.5 G/DL (ref 31–36)
MCV RBC AUTO: 94.1 FL (ref 80–100)
MONOCYTES # BLD: 0.8 K/UL (ref 0–1.3)
MONOCYTES NFR BLD: 10 %
NEUTROPHILS # BLD: 6.9 K/UL (ref 1.7–7.7)
NEUTROPHILS NFR BLD: 81.5 %
PERFORMED ON: ABNORMAL
PLATELET # BLD AUTO: 270 K/UL (ref 135–450)
PMV BLD AUTO: 8 FL (ref 5–10.5)
POTASSIUM SERPL-SCNC: 3.9 MMOL/L (ref 3.5–5.1)
RBC # BLD AUTO: 2.6 M/UL (ref 4.2–5.9)
SODIUM SERPL-SCNC: 144 MMOL/L (ref 136–145)
WBC # BLD AUTO: 8.4 K/UL (ref 4–11)

## 2025-02-11 PROCEDURE — 85025 COMPLETE CBC W/AUTO DIFF WBC: CPT

## 2025-02-11 PROCEDURE — 95816 EEG AWAKE AND DROWSY: CPT

## 2025-02-11 PROCEDURE — 6370000000 HC RX 637 (ALT 250 FOR IP): Performed by: INTERNAL MEDICINE

## 2025-02-11 PROCEDURE — 92526 ORAL FUNCTION THERAPY: CPT

## 2025-02-11 PROCEDURE — 2580000003 HC RX 258: Performed by: INTERNAL MEDICINE

## 2025-02-11 PROCEDURE — 6360000002 HC RX W HCPCS: Performed by: INTERNAL MEDICINE

## 2025-02-11 PROCEDURE — 2700000000 HC OXYGEN THERAPY PER DAY

## 2025-02-11 PROCEDURE — 2500000003 HC RX 250 WO HCPCS: Performed by: INTERNAL MEDICINE

## 2025-02-11 PROCEDURE — 51798 US URINE CAPACITY MEASURE: CPT

## 2025-02-11 PROCEDURE — 99233 SBSQ HOSP IP/OBS HIGH 50: CPT | Performed by: INTERNAL MEDICINE

## 2025-02-11 PROCEDURE — 6360000002 HC RX W HCPCS: Performed by: STUDENT IN AN ORGANIZED HEALTH CARE EDUCATION/TRAINING PROGRAM

## 2025-02-11 PROCEDURE — 99232 SBSQ HOSP IP/OBS MODERATE 35: CPT | Performed by: INTERNAL MEDICINE

## 2025-02-11 PROCEDURE — 95816 EEG AWAKE AND DROWSY: CPT | Performed by: PSYCHIATRY & NEUROLOGY

## 2025-02-11 PROCEDURE — 80048 BASIC METABOLIC PNL TOTAL CA: CPT

## 2025-02-11 PROCEDURE — 94640 AIRWAY INHALATION TREATMENT: CPT

## 2025-02-11 PROCEDURE — 6370000000 HC RX 637 (ALT 250 FOR IP): Performed by: PSYCHIATRY & NEUROLOGY

## 2025-02-11 PROCEDURE — 99291 CRITICAL CARE FIRST HOUR: CPT | Performed by: INTERNAL MEDICINE

## 2025-02-11 PROCEDURE — 93306 TTE W/DOPPLER COMPLETE: CPT

## 2025-02-11 PROCEDURE — 94761 N-INVAS EAR/PLS OXIMETRY MLT: CPT

## 2025-02-11 PROCEDURE — 99233 SBSQ HOSP IP/OBS HIGH 50: CPT | Performed by: SURGERY

## 2025-02-11 PROCEDURE — 2000000000 HC ICU R&B

## 2025-02-11 PROCEDURE — 93306 TTE W/DOPPLER COMPLETE: CPT | Performed by: INTERNAL MEDICINE

## 2025-02-11 PROCEDURE — 36415 COLL VENOUS BLD VENIPUNCTURE: CPT

## 2025-02-11 RX ORDER — GABAPENTIN 400 MG/1
400 CAPSULE ORAL 4 TIMES DAILY
Status: DISCONTINUED | OUTPATIENT
Start: 2025-02-11 | End: 2025-02-12

## 2025-02-11 RX ORDER — CHLORDIAZEPOXIDE HYDROCHLORIDE 25 MG/1
50 CAPSULE, GELATIN COATED ORAL 3 TIMES DAILY
Status: DISCONTINUED | OUTPATIENT
Start: 2025-02-11 | End: 2025-02-13

## 2025-02-11 RX ADMIN — METOPROLOL TARTRATE 2.5 MG: 5 INJECTION, SOLUTION INTRAVENOUS at 20:21

## 2025-02-11 RX ADMIN — CHLORDIAZEPOXIDE HYDROCHLORIDE 50 MG: 25 CAPSULE ORAL at 12:22

## 2025-02-11 RX ADMIN — FOLIC ACID 1000 MCG: 1 TABLET ORAL at 08:56

## 2025-02-11 RX ADMIN — LEVETIRACETAM 1000 MG: 100 INJECTION INTRAVENOUS at 08:57

## 2025-02-11 RX ADMIN — ALBUTEROL SULFATE 2.5 MG: 0.83 SOLUTION RESPIRATORY (INHALATION) at 07:04

## 2025-02-11 RX ADMIN — LEVETIRACETAM 1000 MG: 100 INJECTION INTRAVENOUS at 20:33

## 2025-02-11 RX ADMIN — DILTIAZEM HYDROCHLORIDE 180 MG: 180 CAPSULE, COATED, EXTENDED RELEASE ORAL at 08:56

## 2025-02-11 RX ADMIN — GABAPENTIN 800 MG: 400 CAPSULE ORAL at 08:56

## 2025-02-11 RX ADMIN — MUPIROCIN: 20 OINTMENT TOPICAL at 20:33

## 2025-02-11 RX ADMIN — INSULIN GLARGINE 15 UNITS: 100 INJECTION, SOLUTION SUBCUTANEOUS at 20:20

## 2025-02-11 RX ADMIN — BUDESONIDE AND FORMOTEROL FUMARATE DIHYDRATE 2 PUFF: 160; 4.5 AEROSOL RESPIRATORY (INHALATION) at 07:04

## 2025-02-11 RX ADMIN — DEXMEDETOMIDINE HYDROCHLORIDE 1.1 MCG/KG/HR: 4 INJECTION, SOLUTION INTRAVENOUS at 00:22

## 2025-02-11 RX ADMIN — BUDESONIDE AND FORMOTEROL FUMARATE DIHYDRATE 2 PUFF: 160; 4.5 AEROSOL RESPIRATORY (INHALATION) at 19:40

## 2025-02-11 RX ADMIN — QUETIAPINE FUMARATE 25 MG: 25 TABLET ORAL at 08:56

## 2025-02-11 RX ADMIN — DEXTROSE MONOHYDRATE: 50 INJECTION, SOLUTION INTRAVENOUS at 06:56

## 2025-02-11 RX ADMIN — Medication 100 MG: at 08:56

## 2025-02-11 RX ADMIN — Medication 5000 UNITS: at 08:56

## 2025-02-11 RX ADMIN — SODIUM CHLORIDE, PRESERVATIVE FREE 10 ML: 5 INJECTION INTRAVENOUS at 08:58

## 2025-02-11 RX ADMIN — DULOXETINE 60 MG: 60 CAPSULE, DELAYED RELEASE ORAL at 08:56

## 2025-02-11 RX ADMIN — CHLORDIAZEPOXIDE HYDROCHLORIDE 50 MG: 25 CAPSULE ORAL at 20:20

## 2025-02-11 RX ADMIN — METOPROLOL TARTRATE 2.5 MG: 5 INJECTION, SOLUTION INTRAVENOUS at 08:57

## 2025-02-11 RX ADMIN — DEXMEDETOMIDINE HYDROCHLORIDE 1.3 MCG/KG/HR: 4 INJECTION, SOLUTION INTRAVENOUS at 04:40

## 2025-02-11 RX ADMIN — GABAPENTIN 800 MG: 400 CAPSULE ORAL at 12:22

## 2025-02-11 RX ADMIN — GABAPENTIN 400 MG: 400 CAPSULE ORAL at 17:26

## 2025-02-11 RX ADMIN — ATORVASTATIN CALCIUM 40 MG: 40 TABLET, FILM COATED ORAL at 08:56

## 2025-02-11 RX ADMIN — ALBUTEROL SULFATE 2.5 MG: 0.83 SOLUTION RESPIRATORY (INHALATION) at 13:07

## 2025-02-11 RX ADMIN — GABAPENTIN 400 MG: 400 CAPSULE ORAL at 20:20

## 2025-02-11 RX ADMIN — DEXMEDETOMIDINE HYDROCHLORIDE 0.9 MCG/KG/HR: 4 INJECTION, SOLUTION INTRAVENOUS at 09:51

## 2025-02-11 RX ADMIN — QUETIAPINE FUMARATE 25 MG: 25 TABLET ORAL at 20:20

## 2025-02-11 RX ADMIN — ENOXAPARIN SODIUM 40 MG: 100 INJECTION SUBCUTANEOUS at 08:58

## 2025-02-11 RX ADMIN — TIOTROPIUM BROMIDE INHALATION SPRAY 2 PUFF: 3.12 SPRAY, METERED RESPIRATORY (INHALATION) at 07:05

## 2025-02-11 RX ADMIN — SODIUM CHLORIDE, PRESERVATIVE FREE 10 ML: 5 INJECTION INTRAVENOUS at 20:21

## 2025-02-11 RX ADMIN — ALLOPURINOL 300 MG: 300 TABLET ORAL at 08:57

## 2025-02-11 RX ADMIN — DEXMEDETOMIDINE HYDROCHLORIDE 0.5 MCG/KG/HR: 4 INJECTION, SOLUTION INTRAVENOUS at 17:40

## 2025-02-11 RX ADMIN — FERROUS SULFATE TAB 325 MG (65 MG ELEMENTAL FE) 325 MG: 325 (65 FE) TAB at 17:25

## 2025-02-11 RX ADMIN — PANTOPRAZOLE SODIUM 40 MG: 40 INJECTION, POWDER, LYOPHILIZED, FOR SOLUTION INTRAVENOUS at 08:57

## 2025-02-11 RX ADMIN — THIAMINE HYDROCHLORIDE 200 MG: 100 INJECTION, SOLUTION INTRAMUSCULAR; INTRAVENOUS at 12:31

## 2025-02-11 RX ADMIN — MUPIROCIN: 20 OINTMENT TOPICAL at 08:58

## 2025-02-11 RX ADMIN — METOPROLOL TARTRATE 2.5 MG: 5 INJECTION, SOLUTION INTRAVENOUS at 15:21

## 2025-02-11 RX ADMIN — THERA TABS 1 TABLET: TAB at 08:56

## 2025-02-11 RX ADMIN — FERROUS SULFATE TAB 325 MG (65 MG ELEMENTAL FE) 325 MG: 325 (65 FE) TAB at 08:56

## 2025-02-11 RX ADMIN — ALBUTEROL SULFATE 2.5 MG: 0.83 SOLUTION RESPIRATORY (INHALATION) at 19:39

## 2025-02-11 RX ADMIN — ALBUTEROL SULFATE 2.5 MG: 2.5 SOLUTION RESPIRATORY (INHALATION) at 23:26

## 2025-02-11 NOTE — PROGRESS NOTES
No urine output for this shift bladder scanner pt results 440 st cath pt per PRN orders returned 400 beryl urine pt tolerated well

## 2025-02-11 NOTE — PROGRESS NOTES
Speech Language Pathology  Attempt Note     Name: Sandor Early  : 1955  Medical Diagnosis: Hypomagnesemia [E83.42]  Influenza A [J10.1]  COPD exacerbation (HCC) [J44.1]  Respiratory failure with hypoxia [J96.91]  Alcohol use disorder [F10.90]  Acute on chronic respiratory failure with hypoxia and hypercapnia [J96.21, J96.22]  Acute respiratory failure [J96.00]      SLP attempted to see pt for bedside swallow re-evaluation (BSE). Order acknowledged and chart reviewed for completion of eval. Evaluation / treatment unable to be completed as pt currently with staff at bedside for procedure. SLP to re-attempt as pt's condition and schedule allows. RN aware. No charges.    Thank you,    Tayla Shelby M.A. SLP  Speech-Language Pathologist  [place] Lic #[id number]  [phone]

## 2025-02-11 NOTE — PROGRESS NOTES
IM Progress Note    Admit Date:  1/30/2025  12    Interval history:  Influenza A and Acute respiratory failure   Was on bipap in iCU and improved, transferred to [facility]      Has NG placed for Ileus  , he pulled out NG and likely has aspirated became hypoxic and transferred to ICU     Seizure like event 2/7,   Remains confused  MRI brain incomplete     Subjective:  Mr. Early was agitated and confused yesterday  Restraints placed   Had to be placed on Precedex.    Has 2 BMs    Now on precedex   Anxious        Objective:   BP (!) 112/52   Pulse 73   Temp 98.1 °F (36.7 °C) (Axillary)   Resp 18   Ht 1.702 m (5' 7.01\")   Wt 84.8 kg (186 lb 15.2 oz)   SpO2 98%   BMI 29.27 kg/m²     Intake/Output Summary (Last 24 hours) at 2/11/2025 0718  Last data filed at 2/11/2025 0654  Gross per 24 hour   Intake 1557.84 ml   Output 700 ml   Net 857.84 ml       Physical Exam:        General:  elderly obese male,mildly agitated   NG in place  Mucous Membranes:  Pink , anicteric  Neck: No JVD, no carotid bruit, no thyromegaly  Chest:  Clear to auscultation bilaterally diminished in bases with occasional wheeze  Cardiovascular:  RRR S1S2 heard, no murmurs or gallops  Abdomen:  full,  obese +distended, mild diffuse tenderness   tender, no organomegaly, BS absent  Extremities: No edema or cyanosis. Distal pulses well felt  Neurological :alert,oriented to place  Moving all ext    Medications:   Scheduled Medications:    mupirocin   Each Nostril BID    QUEtiapine  25 mg Oral BID    thiamine  100 mg Oral Daily    multivitamin  1 tablet Oral Daily    metoprolol  2.5 mg IntraVENous Q6H    pantoprazole (PROTONIX) 40 mg in sodium chloride (PF) 0.9 % 10 mL injection  40 mg IntraVENous Daily    albuterol  2.5 mg Nebulization TID RT    levETIRAcetam  1,000 mg IntraVENous BID    [Held by provider] metoprolol succinate  50 mg Oral BID    sodium chloride flush  5-40 mL IntraVENous 2 times per day    enoxaparin  40 mg SubCUTAneous Daily    insulin

## 2025-02-11 NOTE — FLOWSHEET NOTE
02/11/25 1600   External Urinary Catheter   Placement Date: 02/10/25     Output (mL) 350 mL     Patient has been unable to urinate through out the day. 2 bladder scans completed see charting. Patient stated that he cannot use the bathroom in bed. BSC obtained. Patient stood and pivoted to the commode with minimal help. 350 of clear yellow urine returned in the external catheter. Electronically signed by Leobardo Majano RN on 2/11/2025 at 4:37 PM

## 2025-02-11 NOTE — PLAN OF CARE
Problem: Pain  Goal: Verbalizes/displays adequate comfort level or baseline comfort level  Outcome: Progressing  Flowsheets (Taken 2/9/2025 2351 by Lois Baker, RN)  Verbalizes/displays adequate comfort level or baseline comfort level:   Encourage patient to monitor pain and request assistance   Assess pain using appropriate pain scale   Implement non-pharmacological measures as appropriate and evaluate response   Administer analgesics based on type and severity of pain and evaluate response  Note: Pain is 0/10.      Problem: Respiratory - Adult  Goal: Achieves optimal ventilation and oxygenation  Outcome: Progressing  Flowsheets (Taken 2/11/2025 0800)  Achieves optimal ventilation and oxygenation:   Assess for changes in respiratory status   Assess for changes in mentation and behavior   Position to facilitate oxygenation and minimize respiratory effort   Oxygen supplementation based on oxygen saturation or arterial blood gases  Note: Patient is on his home oxygen levels.      Problem: Respiratory - Adult  Goal: Achieves optimal ventilation and oxygenation  Outcome: Progressing  Flowsheets (Taken 2/11/2025 0800)  Achieves optimal ventilation and oxygenation:   Assess for changes in respiratory status   Assess for changes in mentation and behavior   Position to facilitate oxygenation and minimize respiratory effort   Oxygen supplementation based on oxygen saturation or arterial blood gases  Note: Patient is on his home oxygen levels.      Problem: Pain  Goal: Verbalizes/displays adequate comfort level or baseline comfort level  Outcome: Progressing  Flowsheets (Taken 2/9/2025 2351 by Lois Baker, RN)  Verbalizes/displays adequate comfort level or baseline comfort level:   Encourage patient to monitor pain and request assistance   Assess pain using appropriate pain scale   Implement non-pharmacological measures as appropriate and evaluate response   Administer analgesics based on type and severity of pain and

## 2025-02-11 NOTE — PROGRESS NOTES
Pulmonary Medicine Progress Note :                                                               CC: COPD and flu and SOB     Subjective:  Precedex 0.9 mcg/kg/hr   3L- uses 4 at home   No drips   BM yesterday   Confused and agitated         Intake/Output Summary (Last 24 hours) at 2/11/2025 0719  Last data filed at 2/11/2025 0654  Gross per 24 hour   Intake 1557.84 ml   Output 700 ml   Net 857.84 ml         PHYSICAL EXAM:  BP (!) 112/52   Pulse 73   Temp 98.1 °F (36.7 °C) (Axillary)   Resp 18   Ht 1.702 m (5' 7.01\")   Wt 84.8 kg (186 lb 15.2 oz)   SpO2 98%   BMI 29.27 kg/m²  3 L  Constitutional:  No acute distress.   Eyes: PERRL. Conjunctivae anicteric.   ENT: Normal nose. Normal tongue.    Neck:  Trachea is midline.   Respiratory: No accessory muscle usage.   Decreased breath sounds. Few wheezes. No rales. No Rhonchi.  Cardiovascular: Normal S1S2. No digit clubbing. No digit cyanosis. No LE edema.   Psychiatric: No anxiety or Agitation. Alert and Oriented to person only    LABS:  CBC:   Recent Labs     02/09/25  0446 02/10/25  0438 02/11/25  0532   WBC 6.8 7.8 8.4   HGB 8.8* 9.1* 8.2*   HCT 26.4* 27.2* 24.5*   MCV 94.3 95.5 94.1    310 270     BMP:   Recent Labs     02/09/25  0446 02/10/25  0438 02/11/25  0532   * 148* 144   K 3.4* 3.3* 3.9    105 105   CO2 36* 30 29   BUN 17 14 16   CREATININE 0.7* 0.7* 0.7*     LIVER PROFILE:   Recent Labs     02/08/25  2212   AST 33   ALT 13   BILIDIR 0.3   BILITOT 0.4   ALKPHOS 70     Microbiology:  Flu +     Imaging:  CXR 2/8 images independently reviewed by me and showed:  No acute cardiopulmonary disease     ASSESSMENT:  Acute COPD exacerbation  Alcohol withdrawal with Delirium tremens  Chronic hypoxia on 4 L O2  Hypernatremia   Flu A + 1/30  Electrolytes disorder   Tobacco abuse  Ileus  Agitation - suspecting alcohol withdrawal.   ROCKWELL cirrhosis  Chronic diastolic dysfunction  DM2  PVD status post aortoiliac  bpg  Patient has had several episodes of seizure-like activity   Alcohol use/abuse 30 beer/day       PLAN:  Supplemental oxygen to maintain SaO2 >92%; wean as tolerated    Wean Precedex drip   Seizure and fall precautions  Seroquel 25 BID for agitation   Librium 50 mg 3 times daily  Rally pack   Inhaled bronchodilators  Completed Tamiflu and Zpak  D/C Flu isolation   Completed 5 days Prednisone   D5W 50 cc/hr and follow Na  N.p.o. and NG tube to low intermittent suction   Keppra started by neurology  Lopressor IV 2.5 IV Q6 hrs   Labetalol 10 mg IV Q 6 hrs PRN for SBP > 160   Electrolytes replacement   Smoking cessation counseling.   Lovenox DVT prophylaxis     This patient is critically ill. I spent 31 minutes directly engaged in the delivery of critical care to this patient, which was directed towards the patient's critical illness as listed above. This included direct patient contact, management of life support systems review of data (i.e.: imaging and lab), discussion with team members, and this time excludes time spent on procedures.          [address]805 Exp Problem Focused - Mod. Complex

## 2025-02-11 NOTE — PROGRESS NOTES
EEG completed and available for interpretation on the [facility] database .

## 2025-02-11 NOTE — PROGRESS NOTES
[facility] SURGERY DAILY PROGRESS NOTE    SUBJECTIVE: Awake, alert.  Agitation has increased requiring precedex.  Had BM again after suppository.     OBJECTIVE: CURRENT VITALS:  /61   Pulse 87   Temp 98.1 °F (36.7 °C) (Axillary)   Resp 17   Ht 1.702 m (5' 7.01\")   Wt 84.8 kg (186 lb 15.2 oz)   SpO2 99%   BMI 29.27 kg/m²          ABD: Soft.  Some distention.    LABS:    CBC:   Recent Labs     02/09/25 0446 02/10/25  0438 02/11/25  0532   WBC 6.8 7.8 8.4   RBC 2.80* 2.85* 2.60*   HGB 8.8* 9.1* 8.2*   HCT 26.4* 27.2* 24.5*   MCV 94.3 95.5 94.1   RDW 15.5* 16.0* 15.7*    310 270     BMP:   Recent Labs     02/09/25 0446 02/10/25  0438 02/11/25  0532   * 148* 144   K 3.4* 3.3* 3.9    105 105   CO2 36* 30 29   BUN 17 14 16   CREATININE 0.7* 0.7* 0.7*     Recent Labs     02/09/25 0446 02/10/25  0438   MG 1.58* 1.58*        ASSESSMENT:   Influenza A  Ileus versus SBO       PLAN:   Continue current NPO, NG and supportive treatments.  Will ask S&LP to evaluate to assess safety for PO trial.     OOB as tolerated.     Pulmonary toilet.    Case discussed with nursing and Dr. Romero.           LINDY DE SANTIAGO MD

## 2025-02-11 NOTE — PROCEDURES
INTERPRETATION:  This 25-minute, computer-assisted video EEG recording is abnormal.  It showed moderate to severe continuous generalized slowing background activity.  There was no clear-cut epileptiform discharge in the study.  The EEG findings were consistent with moderate to severe nonspecific generalized cerebral dysfunction or medication effect.    CLASSIFICATION:  Dysrhythmia grade 2, generalized.  EKG channel.    DESCRIPTION:    BACKGROUND: The EEG background showed continuous generalized low amplitude intermixed 2 to 7 Hz theta/delta activity with occasional EEG attenuation.  The EEG showed fair variability and reactivity.  There was no significant change on the EEG background with photic stimulation.  Hyperventilation was omitted due to patient's condition.    INTERICTAL DISCHARGES: None    CLINICAL EVENTS:  None    The EKG channel was unremarkable.

## 2025-02-11 NOTE — CARE COORDINATION
Patient with NG.  Per provider okay to try liquids today. Will have swallow study first.  Precert for [facility] is good through 2/12. If not discharged on 2/12 will need to start new precert.

## 2025-02-11 NOTE — PROGRESS NOTES
Neurology Progress Note    ID: Sandor Early is a 69 y.o. male    : 1955     LOS: 12 days     ASSESSMENT    Principal Problem:    Respiratory failure with hypoxia  Active Problems:    Seizure (HCC)    Tobacco abuse    Acute on chronic respiratory failure with hypoxia and hypercapnia    Diabetes mellitus (HCC)    Acute respiratory failure    Influenza A    Alcohol use disorder    Hypomagnesemia    SBO (small bowel obstruction) (HCC)    Acute encephalopathy    Hypernatremia    Disorder of electrolytes    Ileus (HCC)    Agitation    Chest pain    Alcohol withdrawal syndrome, with delirium ([facility])  Resolved Problems:    * No resolved hospital problems. *    The patient remains altered without focal neurological deficits.  CT brain showed generalized brain atrophy.  EEG showed moderate encephalopathy.  Follow-up EEG on  still showed significant encephalopathy.    Based on the history, the pathology of seizure-like activity is unclear.  The history of bilateral shaking with retained awareness is not typical for epileptic event.  The patient remains on levetiracetam 1000 mg twice daily.  This is likely metabolic/toxic encephalopathy.    25: The patient remains confused.  The patient remains on low-dose Precedex.  There is no focal weakness or numbness during my assessment outside encephalopathy.    PLAN    1.  Continue levetiracetam 1000 mg IV twice daily for now.  2.  Seizure precaution.  3.  Treatment of infection per primary team.  4.  Ativan 2 mg as needed for breakthrough seizure.  5.  Minimize sedation and anticoagulant medication.  Will decrease gabapentin by 50%.      Medications:  Scheduled Meds:    thiamine (B-1) 200 mg in sodium chloride 0.9 % 100 mL IVPB  200 mg IntraVENous Q24H    chlordiazePOXIDE  50 mg Oral TID    mupirocin   Each Nostril BID    QUEtiapine  25 mg Oral BID    multivitamin  1 tablet Oral Daily    metoprolol  2.5 mg IntraVENous Q6H    pantoprazole (PROTONIX) 40 mg in

## 2025-02-11 NOTE — PLAN OF CARE
Problem: Chronic Conditions and Co-morbidities  Goal: Patient's chronic conditions and co-morbidity symptoms are monitored and maintained or improved  Outcome: Progressing     Problem: Discharge Planning  Goal: Discharge to home or other facility with appropriate resources  Outcome: Progressing     Problem: Safety - Adult  Goal: Free from fall injury  Outcome: Progressing     Problem: Pain  Goal: Verbalizes/displays adequate comfort level or baseline comfort level  Outcome: Progressing     Problem: Respiratory - Adult  Goal: Achieves optimal ventilation and oxygenation  Outcome: Progressing     Problem: Cardiovascular - Adult  Goal: Maintains optimal cardiac output and hemodynamic stability  Outcome: Progressing     Problem: Safety - Medical Restraint  Goal: Remains free of injury from restraints (Restraint for Interference with Medical Device)  Description: INTERVENTIONS:  1. Determine that other, less restrictive measures have been tried or would not be effective before applying the restraint  2. Evaluate the patient's condition at the time of restraint application  3. Inform patient/family regarding the reason for restraint  4. Q2H: Monitor safety, psychosocial status, comfort, nutrition and hydration  Recent Flowsheet Documentation

## 2025-02-11 NOTE — PROGRESS NOTES
Pt alert anxious and yelling out. Increased precedex 1.1 mcg/kg/hr at 2048. Shift  assessment at 2050. NG to Left nares clamped at this time d/t meds given, per orders.  Male wick in place no urine noted, Bladder scan completed results 174. Bed alarm on call ling in reach . Patient is not able to demonstrate the ability to move from a reclining position to an upright position within the recliner due to confused .

## 2025-02-11 NOTE — PROGRESS NOTES
Attempting to wean sedation as tolerated. Patient continues to be oriented only to person. Disoriented to place and situation. Educated him on the use of restraints and he verbalized that he would not pull on any of his IV lines or NG tube. Restraints discontinued at this time. Electronically signed by Leobardo Majano RN on 2/11/2025 at 3:43 PM

## 2025-02-11 NOTE — PROGRESS NOTES
Final Anesthesia Post-op Assessment    Patient: Aracelis Srinivasan  Procedure(s) Performed: LEFT TOTAL HIP ARTHROPLASTY  Anesthesia type: General    Vitals Value Taken Time   Temp 36.2 °C (97.2 °F) 11/16/21 1255   Pulse 88 11/16/21 1255   Resp 15 11/16/21 1255   SpO2 100 % 11/16/21 1255   /61 11/16/21 1255         Patient Location: PACU Phase 1  Level of Consciousness: participates in exam, responds to stimulation and sedated  Respiratory Status: nasal cannula and spontaneous ventilation  Cardiovascular blood pressure returned to baseline  Hydration: euvolemic  Pain Management: adequately controlled  Handoff: Handoff to receiving clinician was performed and questions were answered  Vomiting: none  Nausea: None  Airway Patency:patent  Post-op Assessment: no complications, patient tolerated procedure well with no complications, no evidence of recall and dentition within defined limits  Comments: Extubated easily in OR. HOB elevated.      No complications documented.    [facility] Daily Progress Note      Admit Date:  1/30/2025    Subjective:  Mr. Early is seen for chest pain.  Currently he is up sitting on commode  No reported chest pain.    Sandor Early is a 69 y.o. patient with past medical history of hepatic cirrhosis, hypertension, hyperlipidemia, diabetes, peripheral neuropathy, history of bladder cancer status post removal, who presented to the hospital with complaints of shortness of breath, chest pain.  Patient presented to the [place] ED for shortness of breath which has been going on for several weeks worse in the past few days.  He was  in severe respiratory distress while there.  He was placed on BiPAP and transferred to the ICU at [place].  He has been diagnosed and is being treated for acute on chronic hypoxic and  hypercapnic respiratory failure secondary to influenza A and COPD exacerbation.  He is also had a partial small bowel obstruction versus ileus.  Surgery following plans to repeat serial imaging if not improved plan for surgery next week.  Patient also has acute metabolic encephalopathy thought to be secondary to combination of recent steroids, influenza A and hypoxia.  CT head reportedly negative.  Patient also with focal seizure involving left upper extremity.  Started on Keppra by neurology, EEG with mild generalized slowing.  MRI incomplete with motion artifact.     Patient encephalopathic.  Does not awaken name or verbal commands.  Did not respond to painful stimuli.  Approximate 10 seconds after painful stimuli patient did arouse but is not coherent to have a discussion.  Per bedside RN patient complained of chest pain earlier.     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  Left anterior fascicular block.  Nonspecific ST abnormalities.  No obvious ischemic changes.  Initial troponins on 2/8/2025 within normal limits x 3.  Repeat troponins on 2/10/25: 22-25.  Blood pressure had been uncontrolled most of the

## 2025-02-11 NOTE — PROGRESS NOTES
Patient provided a COPD Educational Folder that includes the following materials:     [x]  [employer] Booklet: Managing your COPD  [x]  ALA: Getting the Most Out of Medication Delivery Devices  [x]  ALA: My COPD Action Plan  [x]  Better Breathers [employer]: [facility]   [x]  Smoking Cessation Classes  [x]  Outpatient Spiritual Care Services  [x]  Magnet: Signs of COPD    PATIENT/CAREGIVER TEACHING:   Level of patient/caregiver understanding able to:   [x] Verbalize understanding   [] Demonstrate understanding       [] Teach back        [] Needs reinforcement     []  Other:

## 2025-02-11 NOTE — PROGRESS NOTES
Comprehensive Nutrition Assessment    Type and Reason for Visit:  Reassess    Nutrition Recommendations/Plan:   Continue NPO status until patient is evaluated and cleared by SLP to safely consume po nutrition.   Monitor GI status, NG tube, and plan of care.   Monitor nutrition-related labs, mental status, whether diet can be advanced, bowel function, and weight trends.      Malnutrition Assessment:  Malnutrition Status:  At risk for malnutrition (02/11/25 1240)    Context:  Acute Illness     Findings of the 6 clinical characteristics of malnutrition:  Energy Intake:  50% or less of estimated energy requirements for 5 or more days  Weight Loss:  Mild weight loss (-8# or 4.5% weight loss since 1/31/25)     Body Fat Loss:  No body fat loss     Muscle Mass Loss:  No muscle mass loss    Fluid Accumulation:  No fluid accumulation     Strength:  Not Performed    Nutrition Assessment:    patient is declining from a nutritional standpoint AEB he remains in NPO status + altered GI function + altered mental status; he remains at risk for further compromise d/t hx of alcohol abuse (brother reported that patient drinks 30 beers per day at home), need for swallow evaluation, and GI abnormality; will continue NPO status and monitor nutrition plan of care    Nutrition Related Findings:    patient is A & O x 2; surgery visited patient this am during rounds and told ICU Team that patient could have swallow evaluation, and if he passes, be started on po liquids; SLP attempted to evaluate patient but were unable to this am; + BM on 2/10/25; NG to LWCS; D5 at 50 ml/hr; on 15 units Lantus nightly and low-dose SSI; librium was added this am Wound Type: None       Current Nutrition Intake & Therapies:    Average Meal Intake: NPO  Average Supplements Intake: NPO  Diet NPO Exceptions are: Sips of Water with Meds, Ice Chips, Popsicles    Anthropometric Measures:  Height: 170.2 cm (5' 7.01\")  Ideal Body Weight (IBW): 148 lbs (67 kg)   to determine     Luciana Garcia RD, LD  Contact: [phone]     Self

## 2025-02-11 NOTE — PROGRESS NOTES
Speech Language Pathology  Swallowing Disorders and Dysphagia  Clinical Bedside Swallow Re-Assessment  Facility/Department: [facility] ICU    Instrumentation: Not clinically indicated at this time. Will continue to monitor  Diet recommendation: NPO; Ice chips with SLP/RN only (oral care must be completed prior); Meds via alternate route  Risk management: upright for all intake, stay upright for at least 30 mins after intake, small bites/sips, total feed, 1:1 supervision with intake, oral care q4 hrs to reduce adverse affects in the event of aspiration, increase physical mobility as able, slow rate of intake, STRICT aspiration precautions, and hold PO and contact SLP if s/s of aspiration or worsening respiratory status develop.      NAME:Sandor Early  : 1955 (69 y.o.)   MRN: [id number]  ROOM: 3008/3008-01  ADMISSION DATE: 2025  Chief Complaint   Patient presents with    Shortness of Breath     Shortness of breath that started yesterday.  88% on 4lpm (baseline).  Non-rebreather placed.  10mg morphine given by squad for back pain.      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  Patient arrived in the ICU on BiPAP alert and oriented x 3 mildly tachypneic.  With transition to oxygen by nasal cannulae sats stayed at 97%.  Patient also tested positive for influenza A and was started on Tamiflu.\"      Pt evaluated by  01/31/2025 with recommendations for:  \"Diet recommendation: IDDSI 5 Minced and moist Solids; IDDSI 0 Thin Liquids; Meds PO as tolerated, consider meds whole in puree as needed  Risk management: upright for all intake, stay upright for at least 30 mins after intake, small bites/sips, oral care 2-3x/day to reduce adverse affects in the event of aspiration, increase physical mobility as able, slow rate of intake, general GERD precautions, general aspiration precautions, and hold PO and contact SLP if s/s of aspiration or worsening respiratory status develop.\"    Pt had low po intake during f/u sessions likely d/t ileus discovered after BSE.      Order received today for re-assessment of swallow following transfer to ICU after pulling NGT placed for ileus and suspected aspiration.    Pt currently NPO and cleared to try liquids if appropriate after repeat swallow assessment    Patient Complaints:   N/A pt is not able to respond appropriately to questions regarding swallowing due to cognitive status    Baseline Method of Oral Meds: Whole in puree as needed    Admitting diagnoses and active problems as follows: Principal Problem:    Respiratory failure with hypoxia  Active Problems:    Seizure (HCC)    Tobacco abuse    Acute on chronic respiratory failure with hypoxia and hypercapnia    Diabetes mellitus ([facility])    Acute respiratory failure    Influenza A    Alcohol use disorder    Hypomagnesemia    SBO (small bowel obstruction) ([facility])    Acute encephalopathy    Hypernatremia    Disorder of electrolytes    Ileus ([facility])    Agitation    Chest

## 2025-02-11 NOTE — PROGRESS NOTES
Reassessment completed (see Flowsheet ) Pt  opens eyes to voice. Fluids infusing per orders . All ICU lines and monitoring  in place .

## 2025-02-12 LAB
AMMONIA PLAS-SCNC: 23 UMOL/L (ref 16–60)
ANION GAP SERPL CALCULATED.3IONS-SCNC: 7 MMOL/L (ref 3–16)
BASOPHILS # BLD: 0 K/UL (ref 0–0.2)
BASOPHILS NFR BLD: 0.3 %
BUN SERPL-MCNC: 16 MG/DL (ref 7–20)
CALCIUM SERPL-MCNC: 7.2 MG/DL (ref 8.3–10.6)
CHLORIDE SERPL-SCNC: 102 MMOL/L (ref 99–110)
CO2 SERPL-SCNC: 32 MMOL/L (ref 21–32)
CREAT SERPL-MCNC: 0.8 MG/DL (ref 0.8–1.3)
DEPRECATED RDW RBC AUTO: 16.2 % (ref 12.4–15.4)
EOSINOPHIL # BLD: 0 K/UL (ref 0–0.6)
EOSINOPHIL NFR BLD: 0.5 %
GFR SERPLBLD CREATININE-BSD FMLA CKD-EPI: >90 ML/MIN/{1.73_M2}
GLUCOSE BLD-MCNC: 105 MG/DL (ref 70–99)
GLUCOSE BLD-MCNC: 112 MG/DL (ref 70–99)
GLUCOSE BLD-MCNC: 116 MG/DL (ref 70–99)
GLUCOSE BLD-MCNC: 116 MG/DL (ref 70–99)
GLUCOSE BLD-MCNC: 126 MG/DL (ref 70–99)
GLUCOSE SERPL-MCNC: 104 MG/DL (ref 70–99)
HCT VFR BLD AUTO: 25 % (ref 40.5–52.5)
HGB BLD-MCNC: 8.2 G/DL (ref 13.5–17.5)
INR PPP: 1.09 (ref 0.85–1.15)
LYMPHOCYTES # BLD: 0.9 K/UL (ref 1–5.1)
LYMPHOCYTES NFR BLD: 10.8 %
MCH RBC QN AUTO: 31 PG (ref 26–34)
MCHC RBC AUTO-ENTMCNC: 32.9 G/DL (ref 31–36)
MCV RBC AUTO: 94.3 FL (ref 80–100)
MONOCYTES # BLD: 0.9 K/UL (ref 0–1.3)
MONOCYTES NFR BLD: 11.6 %
NEUTROPHILS # BLD: 6.3 K/UL (ref 1.7–7.7)
NEUTROPHILS NFR BLD: 76.8 %
PERFORMED ON: ABNORMAL
PLATELET # BLD AUTO: 226 K/UL (ref 135–450)
PMV BLD AUTO: 8.1 FL (ref 5–10.5)
POTASSIUM SERPL-SCNC: 3.6 MMOL/L (ref 3.5–5.1)
PROTHROMBIN TIME: 14.3 SEC (ref 11.9–14.9)
RBC # BLD AUTO: 2.65 M/UL (ref 4.2–5.9)
SODIUM SERPL-SCNC: 141 MMOL/L (ref 136–145)
WBC # BLD AUTO: 8.1 K/UL (ref 4–11)

## 2025-02-12 PROCEDURE — 2500000003 HC RX 250 WO HCPCS: Performed by: INTERNAL MEDICINE

## 2025-02-12 PROCEDURE — 6360000002 HC RX W HCPCS: Performed by: INTERNAL MEDICINE

## 2025-02-12 PROCEDURE — 85025 COMPLETE CBC W/AUTO DIFF WBC: CPT

## 2025-02-12 PROCEDURE — 6370000000 HC RX 637 (ALT 250 FOR IP)

## 2025-02-12 PROCEDURE — 6370000000 HC RX 637 (ALT 250 FOR IP): Performed by: INTERNAL MEDICINE

## 2025-02-12 PROCEDURE — 94761 N-INVAS EAR/PLS OXIMETRY MLT: CPT

## 2025-02-12 PROCEDURE — 51798 US URINE CAPACITY MEASURE: CPT

## 2025-02-12 PROCEDURE — APPSS15 APP SPLIT SHARED TIME 0-15 MINUTES

## 2025-02-12 PROCEDURE — 92526 ORAL FUNCTION THERAPY: CPT

## 2025-02-12 PROCEDURE — 6360000002 HC RX W HCPCS: Performed by: STUDENT IN AN ORGANIZED HEALTH CARE EDUCATION/TRAINING PROGRAM

## 2025-02-12 PROCEDURE — 99291 CRITICAL CARE FIRST HOUR: CPT | Performed by: INTERNAL MEDICINE

## 2025-02-12 PROCEDURE — 99233 SBSQ HOSP IP/OBS HIGH 50: CPT | Performed by: INTERNAL MEDICINE

## 2025-02-12 PROCEDURE — 2000000000 HC ICU R&B

## 2025-02-12 PROCEDURE — 36415 COLL VENOUS BLD VENIPUNCTURE: CPT

## 2025-02-12 PROCEDURE — 94640 AIRWAY INHALATION TREATMENT: CPT

## 2025-02-12 PROCEDURE — 99233 SBSQ HOSP IP/OBS HIGH 50: CPT | Performed by: PSYCHIATRY & NEUROLOGY

## 2025-02-12 PROCEDURE — 2580000003 HC RX 258: Performed by: INTERNAL MEDICINE

## 2025-02-12 PROCEDURE — 2700000000 HC OXYGEN THERAPY PER DAY

## 2025-02-12 PROCEDURE — C1751 CATH, INF, PER/CENT/MIDLINE: HCPCS

## 2025-02-12 PROCEDURE — 99232 SBSQ HOSP IP/OBS MODERATE 35: CPT | Performed by: SURGERY

## 2025-02-12 PROCEDURE — 80048 BASIC METABOLIC PNL TOTAL CA: CPT

## 2025-02-12 PROCEDURE — 82140 ASSAY OF AMMONIA: CPT

## 2025-02-12 PROCEDURE — 6370000000 HC RX 637 (ALT 250 FOR IP): Performed by: PSYCHIATRY & NEUROLOGY

## 2025-02-12 PROCEDURE — 85610 PROTHROMBIN TIME: CPT

## 2025-02-12 RX ORDER — SODIUM CHLORIDE 9 MG/ML
INJECTION, SOLUTION INTRAVENOUS PRN
Status: DISCONTINUED | OUTPATIENT
Start: 2025-02-12 | End: 2025-02-12 | Stop reason: SDUPTHER

## 2025-02-12 RX ORDER — SODIUM CHLORIDE 0.9 % (FLUSH) 0.9 %
5-40 SYRINGE (ML) INJECTION EVERY 12 HOURS SCHEDULED
Status: DISCONTINUED | OUTPATIENT
Start: 2025-02-12 | End: 2025-02-12 | Stop reason: SDUPTHER

## 2025-02-12 RX ORDER — QUETIAPINE FUMARATE 25 MG/1
25 TABLET, FILM COATED ORAL ONCE
Status: COMPLETED | OUTPATIENT
Start: 2025-02-12 | End: 2025-02-12

## 2025-02-12 RX ORDER — BISACODYL 10 MG
20 SUPPOSITORY, RECTAL RECTAL ONCE
Status: COMPLETED | OUTPATIENT
Start: 2025-02-12 | End: 2025-02-12

## 2025-02-12 RX ORDER — QUETIAPINE FUMARATE 25 MG/1
50 TABLET, FILM COATED ORAL 2 TIMES DAILY
Status: DISCONTINUED | OUTPATIENT
Start: 2025-02-12 | End: 2025-02-16

## 2025-02-12 RX ORDER — LEVETIRACETAM 500 MG/1
750 TABLET ORAL 2 TIMES DAILY
Status: DISCONTINUED | OUTPATIENT
Start: 2025-02-12 | End: 2025-02-13

## 2025-02-12 RX ORDER — SODIUM CHLORIDE 0.9 % (FLUSH) 0.9 %
5-40 SYRINGE (ML) INJECTION PRN
Status: DISCONTINUED | OUTPATIENT
Start: 2025-02-12 | End: 2025-02-12 | Stop reason: SDUPTHER

## 2025-02-12 RX ORDER — GABAPENTIN 100 MG/1
200 CAPSULE ORAL 4 TIMES DAILY
Status: DISCONTINUED | OUTPATIENT
Start: 2025-02-12 | End: 2025-02-16

## 2025-02-12 RX ADMIN — GABAPENTIN 400 MG: 400 CAPSULE ORAL at 13:33

## 2025-02-12 RX ADMIN — ATORVASTATIN CALCIUM 40 MG: 40 TABLET, FILM COATED ORAL at 08:23

## 2025-02-12 RX ADMIN — METOPROLOL TARTRATE 2.5 MG: 5 INJECTION, SOLUTION INTRAVENOUS at 03:53

## 2025-02-12 RX ADMIN — DEXTROSE MONOHYDRATE: 50 INJECTION, SOLUTION INTRAVENOUS at 00:31

## 2025-02-12 RX ADMIN — THIAMINE HYDROCHLORIDE 200 MG: 100 INJECTION, SOLUTION INTRAMUSCULAR; INTRAVENOUS at 11:15

## 2025-02-12 RX ADMIN — DEXMEDETOMIDINE HYDROCHLORIDE 1.3 MCG/KG/HR: 4 INJECTION, SOLUTION INTRAVENOUS at 13:37

## 2025-02-12 RX ADMIN — INSULIN GLARGINE 15 UNITS: 100 INJECTION, SOLUTION SUBCUTANEOUS at 19:51

## 2025-02-12 RX ADMIN — THERA TABS 1 TABLET: TAB at 08:21

## 2025-02-12 RX ADMIN — MUPIROCIN: 20 OINTMENT TOPICAL at 08:23

## 2025-02-12 RX ADMIN — METOPROLOL TARTRATE 2.5 MG: 5 INJECTION, SOLUTION INTRAVENOUS at 08:24

## 2025-02-12 RX ADMIN — BUDESONIDE AND FORMOTEROL FUMARATE DIHYDRATE 2 PUFF: 160; 4.5 AEROSOL RESPIRATORY (INHALATION) at 08:50

## 2025-02-12 RX ADMIN — ALBUTEROL SULFATE 2.5 MG: 0.83 SOLUTION RESPIRATORY (INHALATION) at 08:49

## 2025-02-12 RX ADMIN — QUETIAPINE FUMARATE 25 MG: 25 TABLET ORAL at 08:22

## 2025-02-12 RX ADMIN — Medication 5000 UNITS: at 08:22

## 2025-02-12 RX ADMIN — POTASSIUM BICARBONATE 20 MEQ: 782 TABLET, EFFERVESCENT ORAL at 11:15

## 2025-02-12 RX ADMIN — BUDESONIDE AND FORMOTEROL FUMARATE DIHYDRATE 2 PUFF: 160; 4.5 AEROSOL RESPIRATORY (INHALATION) at 19:31

## 2025-02-12 RX ADMIN — PANTOPRAZOLE SODIUM 40 MG: 40 INJECTION, POWDER, LYOPHILIZED, FOR SOLUTION INTRAVENOUS at 08:24

## 2025-02-12 RX ADMIN — LEVETIRACETAM 1000 MG: 100 INJECTION INTRAVENOUS at 08:24

## 2025-02-12 RX ADMIN — DEXMEDETOMIDINE HYDROCHLORIDE 1.5 MCG/KG/HR: 4 INJECTION, SOLUTION INTRAVENOUS at 20:09

## 2025-02-12 RX ADMIN — ALBUTEROL SULFATE 2.5 MG: 0.83 SOLUTION RESPIRATORY (INHALATION) at 19:31

## 2025-02-12 RX ADMIN — CHLORDIAZEPOXIDE HYDROCHLORIDE 50 MG: 25 CAPSULE ORAL at 13:33

## 2025-02-12 RX ADMIN — METOPROLOL TARTRATE 2.5 MG: 5 INJECTION, SOLUTION INTRAVENOUS at 19:52

## 2025-02-12 RX ADMIN — LEVETIRACETAM 750 MG: 500 TABLET, FILM COATED ORAL at 19:52

## 2025-02-12 RX ADMIN — FERROUS SULFATE TAB 325 MG (65 MG ELEMENTAL FE) 325 MG: 325 (65 FE) TAB at 08:23

## 2025-02-12 RX ADMIN — DEXMEDETOMIDINE HYDROCHLORIDE 1.5 MCG/KG/HR: 4 INJECTION, SOLUTION INTRAVENOUS at 17:44

## 2025-02-12 RX ADMIN — DEXMEDETOMIDINE HYDROCHLORIDE 1.1 MCG/KG/HR: 4 INJECTION, SOLUTION INTRAVENOUS at 05:11

## 2025-02-12 RX ADMIN — SODIUM CHLORIDE, PRESERVATIVE FREE 10 ML: 5 INJECTION INTRAVENOUS at 19:54

## 2025-02-12 RX ADMIN — ENOXAPARIN SODIUM 40 MG: 100 INJECTION SUBCUTANEOUS at 08:21

## 2025-02-12 RX ADMIN — FERROUS SULFATE TAB 325 MG (65 MG ELEMENTAL FE) 325 MG: 325 (65 FE) TAB at 17:42

## 2025-02-12 RX ADMIN — FOLIC ACID 1000 MCG: 1 TABLET ORAL at 08:22

## 2025-02-12 RX ADMIN — MUPIROCIN: 20 OINTMENT TOPICAL at 19:53

## 2025-02-12 RX ADMIN — BISACODYL 20 MG: 10 SUPPOSITORY RECTAL at 13:34

## 2025-02-12 RX ADMIN — CHLORDIAZEPOXIDE HYDROCHLORIDE 50 MG: 25 CAPSULE ORAL at 19:52

## 2025-02-12 RX ADMIN — GABAPENTIN 200 MG: 100 CAPSULE ORAL at 19:53

## 2025-02-12 RX ADMIN — DEXMEDETOMIDINE HYDROCHLORIDE 1.1 MCG/KG/HR: 4 INJECTION, SOLUTION INTRAVENOUS at 00:30

## 2025-02-12 RX ADMIN — DULOXETINE 60 MG: 60 CAPSULE, DELAYED RELEASE ORAL at 08:21

## 2025-02-12 RX ADMIN — ALBUTEROL SULFATE 2.5 MG: 0.83 SOLUTION RESPIRATORY (INHALATION) at 13:00

## 2025-02-12 RX ADMIN — CHLORDIAZEPOXIDE HYDROCHLORIDE 50 MG: 25 CAPSULE ORAL at 08:22

## 2025-02-12 RX ADMIN — DEXMEDETOMIDINE HYDROCHLORIDE 1.1 MCG/KG/HR: 4 INJECTION, SOLUTION INTRAVENOUS at 09:57

## 2025-02-12 RX ADMIN — GABAPENTIN 400 MG: 400 CAPSULE ORAL at 08:22

## 2025-02-12 RX ADMIN — TIOTROPIUM BROMIDE INHALATION SPRAY 2 PUFF: 3.12 SPRAY, METERED RESPIRATORY (INHALATION) at 08:50

## 2025-02-12 RX ADMIN — ALLOPURINOL 300 MG: 300 TABLET ORAL at 08:21

## 2025-02-12 RX ADMIN — QUETIAPINE FUMARATE 25 MG: 25 TABLET ORAL at 11:14

## 2025-02-12 RX ADMIN — GABAPENTIN 200 MG: 100 CAPSULE ORAL at 17:42

## 2025-02-12 RX ADMIN — DEXMEDETOMIDINE HYDROCHLORIDE 1.5 MCG/KG/HR: 4 INJECTION, SOLUTION INTRAVENOUS at 23:11

## 2025-02-12 RX ADMIN — QUETIAPINE FUMARATE 50 MG: 25 TABLET ORAL at 19:53

## 2025-02-12 RX ADMIN — SODIUM CHLORIDE, PRESERVATIVE FREE 10 ML: 5 INJECTION INTRAVENOUS at 08:24

## 2025-02-12 ASSESSMENT — PAIN SCALES - GENERAL: PAINLEVEL_OUTOF10: 0

## 2025-02-12 NOTE — PROGRESS NOTES
AM assessment complete, see flowsheet. Medications given per MAR. Pt Alert to self and situation at times. Confusion and talking to himself at times, picking and pulling at IV lines and monitors. NG connected to suction, minimal output. No BM overnight. Sx following and will determine status of ileus. Telesitter in the room, restraints as needed. Awaiting speech eval for further work up.     No other needs noted, awaiting MD rounds.

## 2025-02-12 NOTE — PROGRESS NOTES
Shift assessment completed pt alert x 2 , Sp02 97 % on 4L . NG at 60 to left nares bridle in place. Fluids infusing per orders. Male wick in place. Bed alarm on call light  and bedside table in reach. Patient is not able to demonstrate the ability to move from a reclining position to an upright position within the recliner due to weak .

## 2025-02-12 NOTE — PROGRESS NOTES
Speech Language Pathology  Swallowing Disorders and Dysphagia    Dysphagia Treatment/Follow-Up Note  Facility/Department: [facility]    Recommendations: SLP recommends to downgrade to NPO; Meds via alternate route  Risk Management: Control risk factors for aspiration PNA by completing oral care 3-4x/day and increasing physical mobility as is medically feasible      NAME:Sandor Early  : 1955 (69 y.o.)   MRN: [id number]  ROOM: [address] [place] [address]  ADMISSION DATE: 2025  PATIENT DIAGNOSIS(ES): Hypomagnesemia [E83.42]  Influenza A [J10.1]  COPD exacerbation (HCC) [J44.1]  Respiratory failure with hypoxia [J96.91]  Alcohol use disorder [F10.90]  Acute on chronic respiratory failure with hypoxia and hypercapnia [J96.21, J96.22]  Acute respiratory failure [J96.00]  Allergies   Allergen Reactions    Lisinopril Swelling and Other (See Comments)    Ace Inhibitors Swelling and Other (See Comments)    Influenza Vaccines Other (See Comments)     He states he was hospitalized when he received his first flu vaccine    Tiotropium Bromide Monohydrate Swelling and Other (See Comments)     Tolerates both tudorza and incruse  On Trelegy.    Vilanterol        DATE ONSET: 2025    Pain: The patient does not complain of pain       Current Diet: Diet NPO Exceptions are: Sips of Water with Meds, Ice Chips, Popsicles      Dysphagia Treatment and Impressions:  Subjective: Pt seen in room at bedside with RN (Jose R) permission  Noteworthy events reported/Chart Review: Jose R/RN reports pt agitated this morning. Pt had difficulty with ice chips given by RN  Patient tolerance to current diet and treatment:Pt agitated and in wrist restraints, fatigues easily, calling out for a drink but unable to reason benefits of oral care prior to po intake.      Respiratory Status: Pt with SPO2% of 94 on 4 LPM NC with RR of 22  Oral Care Status:    Poor oral care status  Oral Care Completed?   [x] Yes   [] No  Completed by SLP via pasted toothbrush

## 2025-02-12 NOTE — PROGRESS NOTES
Avasys alarming , pt restless and pulling things off. Precedex increased to 0.9 mcg/kg/hr. Bed alarm on call light in reach

## 2025-02-12 NOTE — PROGRESS NOTES
Reassessment completed (see Flowsheet )  Pt  resting in bed responds to nurse with care pt alert to self . Redirection given for place, time, situation .Fluids infusing per orders. Male wick in place and draining.  All ICU lines and monitoring  in place .

## 2025-02-12 NOTE — PROGRESS NOTES
Reassessment completed (see Flowsheet )   Pt resting  in bed . SpO2 96 % on 4L.  Precedex 1.1 mcg/kg/hr and Dex 5% at 50 ml/hr per orders . Male wick in place and draining beryl urine . All ICU lines and monitoring  in place .

## 2025-02-12 NOTE — PROGRESS NOTES
Harrys alarming  pt trying to get out bed and trying to pull at NG and  02. Pt stating  it time to go to dinner. redirected pt he at hospital, unable to eat at  this time  gave ice chips and mouth care. Increased precedex to 0.7 mcg/kg/hr . Bed alarm on call light in reach

## 2025-02-12 NOTE — PROGRESS NOTES
Midline Insertion Procedure Note  [facility] Vascular Access Team    Sandor Early   Admitted- 1/30/2025  5:28 PM  Admission diagnosis- Hypomagnesemia [E83.42]  Influenza A [J10.1]  COPD exacerbation (HCC) [J44.1]  Respiratory failure with hypoxia [J96.91]  Alcohol use disorder [F10.90]  Acute on chronic respiratory failure with hypoxia and hypercapnia [J96.21, J96.22]  Acute respiratory failure [J96.00]      Attending Physician- Drake Jalloh*  Ordering Physician- Dr. Philip Romero  Indication for Insertion: TPN    US performed for PICC line placement. L. Arm had no suitable vessels for PICC placement.     R. Brachial vessel was small but large enough to attempt PICC placement.    After multiple attempts, PICC placement was unsuccessful.     Jose R MEEKS made aware.    Carol Ann Cordova RN  Vascular Access Team

## 2025-02-12 NOTE — PROGRESS NOTES
Care rounds completed with Dr. Romero and multidisciplinary team. Reviewed labs, meds, VS, assessment, & plan of care for today. See dictated note and new orders for details.     -decrease serequel dose  -place PICC  -start TPN  -clamp NG

## 2025-02-12 NOTE — PROGRESS NOTES
IM Progress Note    Admit Date:  1/30/2025  13    Interval history:  Influenza A and Acute respiratory failure   Was on bipap in iCU and improved, transferred to [facility]      Has NG placed for Ileus  , he pulled out NG and likely has aspirated became hypoxic and transferred to ICU     Seizure like event 2/7,   Remains confused  MRI brain incomplete     Subjective:  Mr. Early continues to be on Precedex.  He is disoriented.  Did not have a BM yesterday.        Objective:   BP (!) 108/51   Pulse 87   Temp 98.1 °F (36.7 °C) (Axillary)   Resp 21   Ht 1.702 m (5' 7.01\")   Wt 88.7 kg (195 lb 8.8 oz)   SpO2 98%   BMI 30.62 kg/m²     Intake/Output Summary (Last 24 hours) at 2/12/2025 0743  Last data filed at 2/12/2025 0649  Gross per 24 hour   Intake 1615.38 ml   Output 1050 ml   Net 565.38 ml       Physical Exam:        General:  elderly obese male,mildly agitated   NG in place  Mucous Membranes:  Pink , anicteric  Neck: No JVD, no carotid bruit, no thyromegaly  Chest:  Clear to auscultation bilaterally diminished in bases with occasional wheeze  Cardiovascular:  RRR S1S2 heard, no murmurs or gallops  Abdomen:  full,  obese +distended, mild diffuse tenderness   tender, no organomegaly, BS absent  Extremities: No edema or cyanosis. Distal pulses well felt  Neurological :disoriented   Moving all ext    Medications:   Scheduled Medications:    thiamine (B-1) 200 mg in sodium chloride 0.9 % 100 mL IVPB  200 mg IntraVENous Q24H    chlordiazePOXIDE  50 mg Oral TID    gabapentin  400 mg Oral 4x Daily    mupirocin   Each Nostril BID    QUEtiapine  25 mg Oral BID    multivitamin  1 tablet Oral Daily    metoprolol  2.5 mg IntraVENous Q6H    pantoprazole (PROTONIX) 40 mg in sodium chloride (PF) 0.9 % 10 mL injection  40 mg IntraVENous Daily    albuterol  2.5 mg Nebulization TID RT    levETIRAcetam  1,000 mg IntraVENous BID    [Held by provider] metoprolol succinate  50 mg Oral BID    sodium chloride flush  5-40 mL IntraVENous

## 2025-02-12 NOTE — PROGRESS NOTES
Neurology Progress Note    ID: Sandor Early is a 69 y.o. male    : 1955     LOS: 13 days     ASSESSMENT    Principal Problem:    Respiratory failure with hypoxia  Active Problems:    Seizure (HCC)    Tobacco abuse    Acute on chronic respiratory failure with hypoxia and hypercapnia    Diabetes mellitus (HCC)    Acute respiratory failure    Influenza A    Alcohol use disorder    Hypomagnesemia    SBO (small bowel obstruction) (HCC)    Acute encephalopathy    Hypernatremia    Disorder of electrolytes    Ileus (HCC)    Agitation    Chest pain    Alcohol withdrawal syndrome, with delirium ([facility])  Resolved Problems:    * No resolved hospital problems. *    The patient remains altered without focal neurological deficits.  CT brain showed generalized brain atrophy.  EEG showed moderate encephalopathy.  Follow-up EEG on  still showed significant encephalopathy.    Based on the history, the pathology of seizure-like activity is unclear.  The history of bilateral shaking with retained awareness is not typical for epileptic event.  The patient remains on levetiracetam 1000 mg twice daily.  This is likely metabolic/toxic encephalopathy.    25: The patient remains confused.  The patient remains on low-dose Precedex.  There is no focal weakness or numbness during my assessment outside encephalopathy.    25: The patient is less confused when compared to yesterday.  The patient is able to follow simple commands.  There has been no seizure-like activity reported so far.    PLAN    1.  Switch levetiracetam to oral 750 mg twice daily.  2.  Seizure precaution.  3.  Treatment of infection per primary team.  4.  Ativan 2 mg as needed for breakthrough seizure.  5.  Minimize sedation and anticoagulant medication.  Will decrease gabapentin down to 200 mg 4 times a day.  This is likely optimal dose for alcohol withdrawal.    Medications:  Scheduled Meds:    QUEtiapine  50 mg Oral BID    fat emulsion  250 mL

## 2025-02-12 NOTE — PROGRESS NOTES
Pulmonary Medicine Progress Note :                                                               CC: COPD and flu and SOB     Subjective:  Precedex 1.3 mcg/kg/hr   3L- uses 4 at home   No drips   Still with agitation         Intake/Output Summary (Last 24 hours) at 2/12/2025 0746  Last data filed at 2/12/2025 0649  Gross per 24 hour   Intake 1615.38 ml   Output 1050 ml   Net 565.38 ml         PHYSICAL EXAM:  BP (!) 108/51   Pulse 87   Temp 98.1 °F (36.7 °C) (Axillary)   Resp 21   Ht 1.702 m (5' 7.01\")   Wt 88.7 kg (195 lb 8.8 oz)   SpO2 98%   BMI 30.62 kg/m²  4 L  Constitutional:  No acute distress.   Eyes: PERRL. Conjunctivae anicteric.   ENT: Normal nose. Normal tongue.    Neck:  Trachea is midline.   Respiratory: No accessory muscle usage.   Decreased breath sounds. Few wheezes. No rales. No Rhonchi.  Cardiovascular: Normal S1S2. No digit clubbing. No digit cyanosis. No LE edema.   Psychiatric: No anxiety or Agitation. Alert and Oriented to person only    LABS:  CBC:   Recent Labs     02/10/25  0438 02/11/25  0532 02/12/25  0533   WBC 7.8 8.4 8.1   HGB 9.1* 8.2* 8.2*   HCT 27.2* 24.5* 25.0*   MCV 95.5 94.1 94.3    270 226     BMP:   Recent Labs     02/10/25  0438 02/11/25  0532 02/12/25  0533   * 144 141   K 3.3* 3.9 3.6    105 102   CO2 30 29 32   BUN 14 16 16   CREATININE 0.7* 0.7* 0.8     LIVER PROFILE:   No results for input(s): \"AST\", \"ALT\", \"LIPASE\", \"AMYLASE\", \"BILIDIR\", \"BILITOT\", \"ALKPHOS\" in the last 72 hours.    Invalid input(s): \"ALB\"    Microbiology:  Flu +     Imaging:  CXR 2/8 images independently reviewed by me and showed:  No acute cardiopulmonary disease     ASSESSMENT:  Acute COPD exacerbation  Alcohol withdrawal with Delirium tremens  Chronic hypoxia on 4 L O2  Hypernatremia   Flu A + 1/30  Electrolytes disorder   Tobacco abuse  Ileus  Agitation - suspecting alcohol withdrawal.   ROCKWELL cirrhosis  Chronic diastolic dysfunction  DM2  PVD

## 2025-02-12 NOTE — PROGRESS NOTES
02/12/25 0800   RT Protocol   History Pulmonary Disease 2   Respiratory pattern 2   Breath sounds 2   Cough 0   Indications for Bronchodilator Therapy Decreased or absent breath sounds   Bronchodilator Assessment Score 6

## 2025-02-12 NOTE — PROGRESS NOTES
General Surgery  Daily Progress Note    Pt Name: Sandor Early  Medical Record Number: [id number]  Date of Birth 1955   Today's Date: 2/12/2025  Admit date: 1/30/2025  LOS: Day 13    SUBJECTIVE  Disoriented. On precedex for agitation.      OBJECTIVE  Vitals:    02/12/25 0700 02/12/25 0822 02/12/25 0900 02/12/25 1000   BP: (!) 108/51 120/63 114/62 94/64   Pulse: 87 84 86 (!) 106   Resp: 21 18 23 22   Temp:  98.4 °F (36.9 °C)     TempSrc:  Temporal     SpO2: 98% 97% 98% 97%   Weight:       Height:           Gen: Disoriented.  Resp: Tachypneic. +Accessory muscle use. 4L NC.  CV: Regular rate. Regular rhythm.   GI: +Reports diffuse TTP but minimal observable pain reaction. +Softly distended. +Sluggish but present bowel sounds.  Skin: Warm and dry. No nodule or rash on exposed extremities.       I/O last 3 completed shifts:  In: 2715.8 [I.V.:2615.8; IV Piggyback:100]  Out: 1100 [Urine:750; Emesis/NG output:350]  No intake/output data recorded.    LABS  CBC:   Recent Labs     02/10/25  0438 02/11/25  0532 02/12/25  0533   WBC 7.8 8.4 8.1   HGB 9.1* 8.2* 8.2*   HCT 27.2* 24.5* 25.0*   MCV 95.5 94.1 94.3    270 226     BMP:   Recent Labs     02/10/25  0438 02/11/25  0532 02/12/25  0533   * 144 141   K 3.3* 3.9 3.6    105 102   CO2 30 29 32   BUN 14 16 16   CREATININE 0.7* 0.7* 0.8     LIVER PROFILE:   No results for input(s): \"AST\", \"ALT\", \"LIPASE\", \"AMYLASE\", \"BILIDIR\", \"BILITOT\", \"ALKPHOS\" in the last 72 hours.    Invalid input(s): \"ALB\"    PT/INR: No results for input(s): \"PROTIME\", \"INR\" in the last 72 hours.  APTT: No results for input(s): \"APTT\" in the last 72 hours.  UA:  No results for input(s): \"NITRITE\", \"COLORU\", \"PHUR\", \"LABCAST\", \"WBCUA\", \"RBCUA\", \"MUCUS\", \"TRICHOMONAS\", \"YEAST\", \"BACTERIA\", \"CLARITYU\", \"SPECGRAV\", \"LEUKOCYTESUR\", \"UROBILINOGEN\", \"BILIRUBINUR\", \"BLOODU\", \"GLUCOSEU\", \"AMORPHOUS\" in the last 72 hours.    Invalid input(s): \"KETONESU\"        IMAGING  XR ABDOMEN (2 VIEWS)

## 2025-02-12 NOTE — PROGRESS NOTES
Pharmacy Consult: TPN    Abdominal imaging has shown SBO vs ileus. Will start TPN at 40ml/hr today and increase to goal tomorrow: 83ml/hr  Sabiha Cid Pharm D 2/12/20251:47 PM  .         LABS  CBC:         Recent Labs     02/10/25  0438 02/11/25  0532 02/12/25  0533   WBC 7.8 8.4 8.1   HGB 9.1* 8.2* 8.2*   HCT 27.2* 24.5* 25.0*   MCV 95.5 94.1 94.3    270 226      BMP:         Recent Labs     02/10/25  0438 02/11/25  0532 02/12/25  0533   * 144 141   K 3.3* 3.9 3.6    105 102   CO2 30 29 32   BUN 14 16 16   CREATININE 0.7* 0.7* 0.8

## 2025-02-12 NOTE — PROGRESS NOTES
4 Eyes Skin Assessment     NAME:  Sandor Early  YOB: 1955  MEDICAL RECORD NUMBER:  [id number]    The patient is being assessed for  Other shift assessment     I agree that at least one RN has performed a thorough Head to Toe Skin Assessment on the patient. ALL assessment sites listed below have been assessed.      Areas assessed by both nurses:    Head, Face, Ears, Shoulders, Back, Chest, Arms, Elbows, Hands, Sacrum. Buttock, Coccyx, Ischium, and Legs. Feet and Heels        Does the Patient have a Wound? No noted wound(s) scattered bruises and abrasion        Abraham Prevention initiated by RN: Yes  Wound Care Orders initiated by RN: No    Pressure Injury (Stage 3,4, Unstageable, DTI, NWPT, and Complex wounds) if present, place Wound referral order by RN under : No    New Ostomies, if present place, Ostomy referral order under : No     Nurse 1 eSignature: Electronically signed by Charissa Daugherty RN on 2/11/25 at 10:49 PM EST    **SHARE this note so that the co-signing nurse can place an eSignature**    Nurse 2 eSignature: {Esignature:[id number]}

## 2025-02-12 NOTE — PLAN OF CARE
Problem: Chronic Conditions and Co-morbidities  Goal: Patient's chronic conditions and co-morbidity symptoms are monitored and maintained or improved  Outcome: Progressing     Problem: Discharge Planning  Goal: Discharge to home or other facility with appropriate resources  Outcome: Progressing     Problem: Safety - Adult  Goal: Free from fall injury  Outcome: Progressing     Problem: Pain  Goal: Verbalizes/displays adequate comfort level or baseline comfort level  2/12/2025 0022 by Charissa Daugherty, RN  Outcome: Progressing     Problem: Respiratory - Adult  Goal: Achieves optimal ventilation and oxygenation  2/12/2025 0022 by Charissa Daugherty, RN  Outcome: Progressing     Problem: Cardiovascular - Adult  Goal: Maintains optimal cardiac output and hemodynamic stability  2/12/2025 0022 by Charissa Daugherty, RN  Outcome: Progressing     Problem: Skin/Tissue Integrity - Adult  Goal: Skin integrity remains intact  Description: 1.  Monitor for areas of redness and/or skin breakdown  2.  Assess vascular access sites hourly  3.  Every 4-6 hours minimum:  Change oxygen saturation probe site  4.  Every 4-6 hours:  If on nasal continuous positive airway pressure, respiratory therapy assess nares and determine need for appliance change or resting period  Outcome: Progressing

## 2025-02-13 ENCOUNTER — APPOINTMENT (OUTPATIENT)
Dept: CT IMAGING | Age: 70
DRG: 207 | End: 2025-02-13
Payer: MEDICARE

## 2025-02-13 ENCOUNTER — APPOINTMENT (OUTPATIENT)
Dept: GENERAL RADIOLOGY | Age: 70
DRG: 207 | End: 2025-02-13
Payer: MEDICARE

## 2025-02-13 PROBLEM — R09.89 PULMONARY CONGESTION: Status: ACTIVE | Noted: 2025-02-13

## 2025-02-13 LAB
ALBUMIN SERPL-MCNC: 2 G/DL (ref 3.4–5)
ALP SERPL-CCNC: 95 U/L (ref 40–129)
ALT SERPL-CCNC: 9 U/L (ref 10–40)
AMORPH SED URNS QL MICRO: ABNORMAL /HPF
ANION GAP SERPL CALCULATED.3IONS-SCNC: 10 MMOL/L (ref 3–16)
AST SERPL-CCNC: 22 U/L (ref 15–37)
BASE EXCESS BLDA CALC-SCNC: 2.4 MMOL/L (ref -3–3)
BASOPHILS # BLD: 0.1 K/UL (ref 0–0.2)
BASOPHILS NFR BLD: 0.6 %
BILIRUB DIRECT SERPL-MCNC: 0.3 MG/DL (ref 0–0.3)
BILIRUB INDIRECT SERPL-MCNC: 0.3 MG/DL (ref 0–1)
BILIRUB SERPL-MCNC: 0.6 MG/DL (ref 0–1)
BILIRUB UR QL STRIP.AUTO: ABNORMAL
BUN SERPL-MCNC: 16 MG/DL (ref 7–20)
CALCIUM SERPL-MCNC: 7.1 MG/DL (ref 8.3–10.6)
CHLORIDE SERPL-SCNC: 102 MMOL/L (ref 99–110)
CLARITY UR: ABNORMAL
CO2 BLDA-SCNC: 30.3 MMOL/L
CO2 SERPL-SCNC: 27 MMOL/L (ref 21–32)
COHGB MFR BLDA: 0.4 % (ref 0–1.5)
COLOR UR: YELLOW
CREAT SERPL-MCNC: 0.9 MG/DL (ref 0.8–1.3)
DEPRECATED RDW RBC AUTO: 15.8 % (ref 12.4–15.4)
EOSINOPHIL # BLD: 0 K/UL (ref 0–0.6)
EOSINOPHIL NFR BLD: 0.3 %
EPI CELLS #/AREA URNS HPF: ABNORMAL /HPF (ref 0–5)
GFR SERPLBLD CREATININE-BSD FMLA CKD-EPI: >90 ML/MIN/{1.73_M2}
GLUCOSE BLD-MCNC: 103 MG/DL (ref 70–99)
GLUCOSE BLD-MCNC: 116 MG/DL (ref 70–99)
GLUCOSE BLD-MCNC: 99 MG/DL (ref 70–99)
GLUCOSE SERPL-MCNC: 107 MG/DL (ref 70–99)
GLUCOSE UR STRIP.AUTO-MCNC: NEGATIVE MG/DL
HCO3 BLDA-SCNC: 28.7 MMOL/L (ref 21–29)
HCT VFR BLD AUTO: 28.7 % (ref 40.5–52.5)
HGB BLD-MCNC: 9 G/DL (ref 13.5–17.5)
HGB BLDA-MCNC: 9.5 G/DL (ref 13.5–17.5)
HGB UR QL STRIP.AUTO: NEGATIVE
KETONES UR STRIP.AUTO-MCNC: NEGATIVE MG/DL
LEUKOCYTE ESTERASE UR QL STRIP.AUTO: NEGATIVE
LYMPHOCYTES # BLD: 0.8 K/UL (ref 1–5.1)
LYMPHOCYTES NFR BLD: 6.2 %
MAGNESIUM SERPL-MCNC: 1.75 MG/DL (ref 1.8–2.4)
MCH RBC QN AUTO: 30.2 PG (ref 26–34)
MCHC RBC AUTO-ENTMCNC: 31.5 G/DL (ref 31–36)
MCV RBC AUTO: 95.8 FL (ref 80–100)
METHGB MFR BLDA: 0.3 %
MONOCYTES # BLD: 1.3 K/UL (ref 0–1.3)
MONOCYTES NFR BLD: 9.4 %
MUCOUS THREADS #/AREA URNS LPF: ABNORMAL /LPF
NEUTROPHILS # BLD: 11.1 K/UL (ref 1.7–7.7)
NEUTROPHILS NFR BLD: 83.5 %
NITRITE UR QL STRIP.AUTO: NEGATIVE
O2 THERAPY: ABNORMAL
PCO2 BLDA: 53.6 MMHG (ref 35–45)
PERFORMED ON: ABNORMAL
PERFORMED ON: ABNORMAL
PERFORMED ON: NORMAL
PH BLDA: 7.35 [PH] (ref 7.35–7.45)
PH UR STRIP.AUTO: 6 [PH] (ref 5–8)
PHOSPHATE SERPL-MCNC: 3.6 MG/DL (ref 2.5–4.9)
PLATELET # BLD AUTO: 203 K/UL (ref 135–450)
PMV BLD AUTO: 9 FL (ref 5–10.5)
PO2 BLDA: 62 MMHG (ref 75–108)
POTASSIUM SERPL-SCNC: 4.2 MMOL/L (ref 3.5–5.1)
POTASSIUM SERPL-SCNC: 4.2 MMOL/L (ref 3.5–5.1)
PROCALCITONIN SERPL IA-MCNC: 0.4 NG/ML (ref 0–0.15)
PROT SERPL-MCNC: 5.1 G/DL (ref 6.4–8.2)
PROT UR STRIP.AUTO-MCNC: 30 MG/DL
RBC # BLD AUTO: 2.99 M/UL (ref 4.2–5.9)
RBC #/AREA URNS HPF: ABNORMAL /HPF (ref 0–4)
SAO2 % BLDA: 90.2 %
SODIUM SERPL-SCNC: 139 MMOL/L (ref 136–145)
SP GR UR STRIP.AUTO: >=1.03 (ref 1–1.03)
TRIGL SERPL-MCNC: 74 MG/DL (ref 0–150)
UA COMPLETE W REFLEX CULTURE PNL UR: ABNORMAL
UA DIPSTICK W REFLEX MICRO PNL UR: YES
URN SPEC COLLECT METH UR: ABNORMAL
UROBILINOGEN UR STRIP-ACNC: 2 E.U./DL
WBC # BLD AUTO: 13.4 K/UL (ref 4–11)
WBC #/AREA URNS HPF: ABNORMAL /HPF (ref 0–5)
YEAST URNS QL MICRO: PRESENT /HPF

## 2025-02-13 PROCEDURE — 36415 COLL VENOUS BLD VENIPUNCTURE: CPT

## 2025-02-13 PROCEDURE — 84100 ASSAY OF PHOSPHORUS: CPT

## 2025-02-13 PROCEDURE — 94640 AIRWAY INHALATION TREATMENT: CPT

## 2025-02-13 PROCEDURE — 87040 BLOOD CULTURE FOR BACTERIA: CPT

## 2025-02-13 PROCEDURE — APPSS15 APP SPLIT SHARED TIME 0-15 MINUTES

## 2025-02-13 PROCEDURE — 6360000004 HC RX CONTRAST MEDICATION: Performed by: SURGERY

## 2025-02-13 PROCEDURE — 6370000000 HC RX 637 (ALT 250 FOR IP): Performed by: INTERNAL MEDICINE

## 2025-02-13 PROCEDURE — 2580000003 HC RX 258: Performed by: INTERNAL MEDICINE

## 2025-02-13 PROCEDURE — 93005 ELECTROCARDIOGRAM TRACING: CPT | Performed by: PEDIATRICS

## 2025-02-13 PROCEDURE — 6360000002 HC RX W HCPCS: Performed by: INTERNAL MEDICINE

## 2025-02-13 PROCEDURE — 2500000003 HC RX 250 WO HCPCS: Performed by: INTERNAL MEDICINE

## 2025-02-13 PROCEDURE — 2700000000 HC OXYGEN THERAPY PER DAY

## 2025-02-13 PROCEDURE — 99291 CRITICAL CARE FIRST HOUR: CPT | Performed by: INTERNAL MEDICINE

## 2025-02-13 PROCEDURE — 87186 SC STD MICRODIL/AGAR DIL: CPT

## 2025-02-13 PROCEDURE — 2500000003 HC RX 250 WO HCPCS: Performed by: STUDENT IN AN ORGANIZED HEALTH CARE EDUCATION/TRAINING PROGRAM

## 2025-02-13 PROCEDURE — 2000000000 HC ICU R&B

## 2025-02-13 PROCEDURE — 86403 PARTICLE AGGLUT ANTBDY SCRN: CPT

## 2025-02-13 PROCEDURE — 36600 WITHDRAWAL OF ARTERIAL BLOOD: CPT

## 2025-02-13 PROCEDURE — 71045 X-RAY EXAM CHEST 1 VIEW: CPT

## 2025-02-13 PROCEDURE — 87641 MR-STAPH DNA AMP PROBE: CPT

## 2025-02-13 PROCEDURE — 99232 SBSQ HOSP IP/OBS MODERATE 35: CPT | Performed by: SURGERY

## 2025-02-13 PROCEDURE — 87205 SMEAR GRAM STAIN: CPT

## 2025-02-13 PROCEDURE — 85025 COMPLETE CBC W/AUTO DIFF WBC: CPT

## 2025-02-13 PROCEDURE — 6370000000 HC RX 637 (ALT 250 FOR IP): Performed by: STUDENT IN AN ORGANIZED HEALTH CARE EDUCATION/TRAINING PROGRAM

## 2025-02-13 PROCEDURE — 81001 URINALYSIS AUTO W/SCOPE: CPT

## 2025-02-13 PROCEDURE — 87070 CULTURE OTHR SPECIMN AEROBIC: CPT

## 2025-02-13 PROCEDURE — 83735 ASSAY OF MAGNESIUM: CPT

## 2025-02-13 PROCEDURE — 6360000004 HC RX CONTRAST MEDICATION

## 2025-02-13 PROCEDURE — 99233 SBSQ HOSP IP/OBS HIGH 50: CPT | Performed by: INTERNAL MEDICINE

## 2025-02-13 PROCEDURE — 84478 ASSAY OF TRIGLYCERIDES: CPT

## 2025-02-13 PROCEDURE — 31720 CLEARANCE OF AIRWAYS: CPT

## 2025-02-13 PROCEDURE — 6370000000 HC RX 637 (ALT 250 FOR IP): Performed by: PSYCHIATRY & NEUROLOGY

## 2025-02-13 PROCEDURE — 87077 CULTURE AEROBIC IDENTIFY: CPT

## 2025-02-13 PROCEDURE — 82803 BLOOD GASES ANY COMBINATION: CPT

## 2025-02-13 PROCEDURE — 80076 HEPATIC FUNCTION PANEL: CPT

## 2025-02-13 PROCEDURE — 74177 CT ABD & PELVIS W/CONTRAST: CPT

## 2025-02-13 PROCEDURE — 80048 BASIC METABOLIC PNL TOTAL CA: CPT

## 2025-02-13 PROCEDURE — 94761 N-INVAS EAR/PLS OXIMETRY MLT: CPT

## 2025-02-13 PROCEDURE — 84145 PROCALCITONIN (PCT): CPT

## 2025-02-13 PROCEDURE — 6370000000 HC RX 637 (ALT 250 FOR IP): Performed by: PEDIATRICS

## 2025-02-13 RX ORDER — SODIUM CHLORIDE 0.9 % (FLUSH) 0.9 %
5-40 SYRINGE (ML) INJECTION PRN
Status: DISCONTINUED | OUTPATIENT
Start: 2025-02-13 | End: 2025-03-16 | Stop reason: SDUPTHER

## 2025-02-13 RX ORDER — DIATRIZOATE MEGLUMINE AND DIATRIZOATE SODIUM 660; 100 MG/ML; MG/ML
12 SOLUTION ORAL; RECTAL
Status: DISCONTINUED | OUTPATIENT
Start: 2025-02-13 | End: 2025-03-25 | Stop reason: HOSPADM

## 2025-02-13 RX ORDER — PHENOBARBITAL 32.4 MG/1
32.4 TABLET ORAL 4 TIMES DAILY
Status: DISCONTINUED | OUTPATIENT
Start: 2025-02-13 | End: 2025-02-13

## 2025-02-13 RX ORDER — SODIUM CHLORIDE 0.9 % (FLUSH) 0.9 %
5-40 SYRINGE (ML) INJECTION EVERY 12 HOURS SCHEDULED
Status: DISCONTINUED | OUTPATIENT
Start: 2025-02-13 | End: 2025-03-16 | Stop reason: SDUPTHER

## 2025-02-13 RX ORDER — PHENOBARBITAL 32.4 MG/1
16.2 TABLET ORAL 2 TIMES DAILY
Status: DISCONTINUED | OUTPATIENT
Start: 2025-02-15 | End: 2025-02-13

## 2025-02-13 RX ORDER — CHLORDIAZEPOXIDE HYDROCHLORIDE 25 MG/1
100 CAPSULE, GELATIN COATED ORAL 3 TIMES DAILY
Status: DISCONTINUED | OUTPATIENT
Start: 2025-02-13 | End: 2025-02-15

## 2025-02-13 RX ORDER — SODIUM CHLORIDE 9 MG/ML
INJECTION, SOLUTION INTRAVENOUS PRN
Status: DISCONTINUED | OUTPATIENT
Start: 2025-02-13 | End: 2025-02-15

## 2025-02-13 RX ORDER — INSULIN GLARGINE 100 [IU]/ML
7 INJECTION, SOLUTION SUBCUTANEOUS NIGHTLY
Status: DISCONTINUED | OUTPATIENT
Start: 2025-02-13 | End: 2025-02-16

## 2025-02-13 RX ORDER — PHENOBARBITAL 32.4 MG/1
16.2 TABLET ORAL EVERY 6 HOURS PRN
Status: ACTIVE | OUTPATIENT
Start: 2025-02-15 | End: 2025-02-16

## 2025-02-13 RX ORDER — ZONISAMIDE 100 MG/1
100 CAPSULE ORAL DAILY
Status: DISCONTINUED | OUTPATIENT
Start: 2025-02-13 | End: 2025-03-02

## 2025-02-13 RX ORDER — IOPAMIDOL 755 MG/ML
75 INJECTION, SOLUTION INTRAVASCULAR
Status: COMPLETED | OUTPATIENT
Start: 2025-02-13 | End: 2025-02-13

## 2025-02-13 RX ORDER — PHENOBARBITAL 32.4 MG/1
32.4 TABLET ORAL EVERY 6 HOURS PRN
Status: ACTIVE | OUTPATIENT
Start: 2025-02-13 | End: 2025-02-15

## 2025-02-13 RX ORDER — PHENOBARBITAL 32.4 MG/1
32.4 TABLET ORAL 2 TIMES DAILY
Status: DISCONTINUED | OUTPATIENT
Start: 2025-02-14 | End: 2025-02-13

## 2025-02-13 RX ADMIN — Medication 5000 UNITS: at 08:17

## 2025-02-13 RX ADMIN — INSULIN GLARGINE 7 UNITS: 100 INJECTION, SOLUTION SUBCUTANEOUS at 21:15

## 2025-02-13 RX ADMIN — TIOTROPIUM BROMIDE INHALATION SPRAY 2 PUFF: 3.12 SPRAY, METERED RESPIRATORY (INHALATION) at 07:45

## 2025-02-13 RX ADMIN — CHLORDIAZEPOXIDE HYDROCHLORIDE 50 MG: 25 CAPSULE ORAL at 08:16

## 2025-02-13 RX ADMIN — DULOXETINE 60 MG: 60 CAPSULE, DELAYED RELEASE ORAL at 08:16

## 2025-02-13 RX ADMIN — PHENOBARBITAL 32.4 MG: 32.4 TABLET ORAL at 08:16

## 2025-02-13 RX ADMIN — IOPAMIDOL 75 ML: 755 INJECTION, SOLUTION INTRAVENOUS at 13:56

## 2025-02-13 RX ADMIN — METOPROLOL TARTRATE 2.5 MG: 5 INJECTION, SOLUTION INTRAVENOUS at 02:31

## 2025-02-13 RX ADMIN — DEXMEDETOMIDINE HYDROCHLORIDE 1.5 MCG/KG/HR: 4 INJECTION, SOLUTION INTRAVENOUS at 05:47

## 2025-02-13 RX ADMIN — MUPIROCIN: 20 OINTMENT TOPICAL at 08:17

## 2025-02-13 RX ADMIN — THIAMINE HYDROCHLORIDE 200 MG: 100 INJECTION, SOLUTION INTRAMUSCULAR; INTRAVENOUS at 11:31

## 2025-02-13 RX ADMIN — ATORVASTATIN CALCIUM 40 MG: 40 TABLET, FILM COATED ORAL at 08:17

## 2025-02-13 RX ADMIN — SODIUM CHLORIDE, PRESERVATIVE FREE 10 ML: 5 INJECTION INTRAVENOUS at 08:18

## 2025-02-13 RX ADMIN — PIPERACILLIN AND TAZOBACTAM 3375 MG: 3; .375 INJECTION, POWDER, LYOPHILIZED, FOR SOLUTION INTRAVENOUS at 16:52

## 2025-02-13 RX ADMIN — BUDESONIDE AND FORMOTEROL FUMARATE DIHYDRATE 2 PUFF: 160; 4.5 AEROSOL RESPIRATORY (INHALATION) at 07:45

## 2025-02-13 RX ADMIN — ALLOPURINOL 300 MG: 300 TABLET ORAL at 08:17

## 2025-02-13 RX ADMIN — ALBUTEROL SULFATE 2.5 MG: 0.83 SOLUTION RESPIRATORY (INHALATION) at 11:00

## 2025-02-13 RX ADMIN — BUDESONIDE AND FORMOTEROL FUMARATE DIHYDRATE 2 PUFF: 160; 4.5 AEROSOL RESPIRATORY (INHALATION) at 19:14

## 2025-02-13 RX ADMIN — GABAPENTIN 200 MG: 100 CAPSULE ORAL at 08:17

## 2025-02-13 RX ADMIN — DEXMEDETOMIDINE HYDROCHLORIDE 1.5 MCG/KG/HR: 4 INJECTION, SOLUTION INTRAVENOUS at 09:15

## 2025-02-13 RX ADMIN — ZONISAMIDE 100 MG: 100 CAPSULE ORAL at 13:02

## 2025-02-13 RX ADMIN — DEXMEDETOMIDINE HYDROCHLORIDE 0.5 MCG/KG/HR: 4 INJECTION, SOLUTION INTRAVENOUS at 15:15

## 2025-02-13 RX ADMIN — ALBUTEROL SULFATE 2.5 MG: 0.83 SOLUTION RESPIRATORY (INHALATION) at 19:13

## 2025-02-13 RX ADMIN — QUETIAPINE FUMARATE 50 MG: 25 TABLET ORAL at 08:17

## 2025-02-13 RX ADMIN — DEXMEDETOMIDINE HYDROCHLORIDE 1.5 MCG/KG/HR: 4 INJECTION, SOLUTION INTRAVENOUS at 02:30

## 2025-02-13 RX ADMIN — DIATRIZOATE MEGLUMINE AND DIATRIZOATE SODIUM 12 ML: 660; 100 LIQUID ORAL; RECTAL at 09:15

## 2025-02-13 RX ADMIN — GABAPENTIN 200 MG: 100 CAPSULE ORAL at 13:02

## 2025-02-13 RX ADMIN — METOPROLOL TARTRATE 2.5 MG: 5 INJECTION, SOLUTION INTRAVENOUS at 08:17

## 2025-02-13 RX ADMIN — FERROUS SULFATE TAB 325 MG (65 MG ELEMENTAL FE) 325 MG: 325 (65 FE) TAB at 08:17

## 2025-02-13 RX ADMIN — PIPERACILLIN AND TAZOBACTAM 4500 MG: 4; .5 INJECTION, POWDER, LYOPHILIZED, FOR SOLUTION INTRAVENOUS at 12:21

## 2025-02-13 RX ADMIN — GABAPENTIN 200 MG: 100 CAPSULE ORAL at 16:49

## 2025-02-13 RX ADMIN — ALBUTEROL SULFATE 2.5 MG: 0.83 SOLUTION RESPIRATORY (INHALATION) at 07:42

## 2025-02-13 RX ADMIN — ENOXAPARIN SODIUM 40 MG: 100 INJECTION SUBCUTANEOUS at 08:17

## 2025-02-13 RX ADMIN — FOLIC ACID 1000 MCG: 1 TABLET ORAL at 08:16

## 2025-02-13 RX ADMIN — LEVETIRACETAM 750 MG: 500 TABLET, FILM COATED ORAL at 08:16

## 2025-02-13 RX ADMIN — CHLORDIAZEPOXIDE HYDROCHLORIDE 100 MG: 25 CAPSULE ORAL at 13:02

## 2025-02-13 RX ADMIN — SODIUM CHLORIDE, PRESERVATIVE FREE 10 ML: 5 INJECTION INTRAVENOUS at 20:43

## 2025-02-13 RX ADMIN — MUPIROCIN: 20 OINTMENT TOPICAL at 20:43

## 2025-02-13 RX ADMIN — FERROUS SULFATE TAB 325 MG (65 MG ELEMENTAL FE) 325 MG: 325 (65 FE) TAB at 16:49

## 2025-02-13 RX ADMIN — PANTOPRAZOLE SODIUM 40 MG: 40 INJECTION, POWDER, LYOPHILIZED, FOR SOLUTION INTRAVENOUS at 08:17

## 2025-02-13 RX ADMIN — THERA TABS 1 TABLET: TAB at 08:18

## 2025-02-13 ASSESSMENT — PAIN SCALES - GENERAL
PAINLEVEL_OUTOF10: 0
PAINLEVEL_OUTOF10: 0

## 2025-02-13 NOTE — PROGRESS NOTES
02/13/25 1139   Oxygen Therapy/Pulse Ox   Pulse 93   Respirations 21   SpO2 93 %   $ABG $Arterial Puncture   Mil's Test #1 Pos   Site #1 Right Radial   Site Prepped #1 Yes   Number of Attempts #1 1   Pressure Held #1 Yes   Complications #1 None   Post-procedure #1 Standard   Specimen Status #1 To lab   How Tolerated? Tolerated well

## 2025-02-13 NOTE — PROGRESS NOTES
General Surgery  Daily Progress Note    Pt Name: Sandor Early  Medical Record Number: [id number]  Date of Birth 1955   Today's Date: 2/13/2025  Admit date: 1/30/2025  LOS: Day 14    SUBJECTIVE  Remains disoriented and on precedex for agitation. Unintelligible speech with questioning this morning.      OBJECTIVE  Vitals:    02/13/25 0100 02/13/25 0125 02/13/25 0200 02/13/25 0300   BP: (!) 100/54  (!) 105/50 103/67   Pulse: 81 81 83 84   Resp: 15 26 17 18   Temp:       TempSrc:       SpO2: 99% 99% 99% 98%   Weight:    88.6 kg (195 lb 5.2 oz)   Height:           Gen: Disoriented.  Resp: Tachypneic. +Accessory muscle use. 3L NC.  CV: Regular rate. Regular rhythm.   GI: +Increased agitation with abdominal palpation diffusely. +Distended. +Hypoactive.  Skin: Warm and dry. No nodule or rash on exposed extremities.       I/O last 3 completed shifts:  In: 1276.6 [I.V.:1176.6; IV Piggyback:100]  Out: 1000 [Urine:700; Emesis/NG output:300]  No intake/output data recorded.    LABS  CBC:   Recent Labs     02/11/25 0532 02/12/25 0533 02/13/25 0450   WBC 8.4 8.1 13.4*   HGB 8.2* 8.2* 9.0*   HCT 24.5* 25.0* 28.7*   MCV 94.1 94.3 95.8    226 203     BMP:   Recent Labs     02/11/25 0532 02/12/25 0533 02/13/25  0450    141 139   K 3.9 3.6 4.2  4.2    102 102   CO2 29 32 27   PHOS  --   --  3.6   BUN 16 16 16   CREATININE 0.7* 0.8 0.9     LIVER PROFILE:   Recent Labs     02/13/25 0450   AST 22   ALT 9*   BILIDIR 0.3   BILITOT 0.6   ALKPHOS 95       PT/INR:   Recent Labs     02/12/25 0533   PROTIME 14.3   INR 1.09     APTT: No results for input(s): \"APTT\" in the last 72 hours.  UA:  No results for input(s): \"NITRITE\", \"COLORU\", \"PHUR\", \"LABCAST\", \"WBCUA\", \"RBCUA\", \"MUCUS\", \"TRICHOMONAS\", \"YEAST\", \"BACTERIA\", \"CLARITYU\", \"SPECGRAV\", \"LEUKOCYTESUR\", \"UROBILINOGEN\", \"BILIRUBINUR\", \"BLOODU\", \"GLUCOSEU\", \"AMORPHOUS\" in the last 72 hours.    Invalid input(s): \"KETONESU\"        IMAGING  XR ABDOMEN (2 VIEWS)

## 2025-02-13 NOTE — PROGRESS NOTES
IM Progress Note    Admit Date:  1/30/2025  14    Interval history:  Influenza A and Acute respiratory failure   Was on bipap in iCU and improved, transferred to [facility]      Has NG placed for Ileus  , he pulled out NG and likely has aspirated became hypoxic and transferred to ICU     Seizure like event 2/7,   Remains confused  MRI brain incomplete     Subjective:  Mr. Early continues to be on Precedex.  He is disoriented.  Did not have a BM yesterday.    2/13   Mentation worse today  On precedex   On 4 L of O2        Objective:   /67   Pulse 84   Temp 98 °F (36.7 °C)   Resp 18   Ht 1.702 m (5' 7.01\")   Wt 88.6 kg (195 lb 5.2 oz)   SpO2 98%   BMI 30.59 kg/m²     Intake/Output Summary (Last 24 hours) at 2/13/2025 0741  Last data filed at 2/12/2025 1849  Gross per 24 hour   Intake 0 ml   Output 300 ml   Net -300 ml       Physical Exam:        General:  elderly obese male,drowsy  NG in place  Mucous Membranes:  Pink , anicteric  Neck: No JVD, no carotid bruit, no thyromegaly  Chest:  Clear to auscultation bilaterally diminished in bases with occasional wheeze  Cardiovascular:  RRR S1S2 heard, no murmurs or gallops  Abdomen:  full,  obese +distended, mild diffuse tenderness   tender, no organomegaly, BS absent  Extremities: No edema or cyanosis. Distal pulses well felt  Neurological :disoriented   Moving all ext    Medications:   Scheduled Medications:    sodium chloride flush  5-40 mL IntraVENous 2 times per day    PHENobarbital  32.4 mg Oral 4x daily    Followed by    [START ON 2/14/2025] PHENobarbital  32.4 mg Oral BID    Followed by    [START ON 2/15/2025] PHENobarbital  16.2 mg Oral BID    QUEtiapine  50 mg Oral BID    fat emulsion  250 mL IntraVENous Once per day on Monday Thursday    gabapentin  200 mg Oral 4x Daily    levETIRAcetam  750 mg Oral BID    thiamine (B-1) 200 mg in sodium chloride 0.9 % 100 mL IVPB  200 mg IntraVENous Q24H    chlordiazePOXIDE  50 mg Oral TID    mupirocin   Each Nostril    BUN 16 16 16   CREATININE 0.7* 0.8 0.9     No results for input(s): \"CKTOTAL\", \"CKMB\", \"CKMBINDEX\", \"TROPONINI\" in the last 72 hours.    Coagulation:   Lab Results   Component Value Date/Time    INR 1.09 02/12/2025 05:33 AM    APTT 31.7 06/29/2019 03:50 PM     Cardiac markers:   Lab Results   Component Value Date/Time    CKTOTAL 137 03/02/2023 08:03 AM    TROPONINI <0.01 02/28/2023 03:09 PM         Lab Results   Component Value Date    ALT 9 (L) 02/13/2025    AST 22 02/13/2025    ALKPHOS 95 02/13/2025    BILITOT 0.6 02/13/2025       Lab Results   Component Value Date    INR 1.09 02/12/2025    INR 0.93 02/07/2024    INR 0.96 09/04/2023    PROTIME 14.3 02/12/2025    PROTIME 12.5 02/07/2024    PROTIME 12.8 09/04/2023       Radiology      XR ABDOMEN (2 VIEWS)   Final Result   Persistent but improving ileus pattern.         XR CHEST PORTABLE   Final Result   1. Persistent mild blunting of left costophrenic angle which could be related   to pleural thickening or trace pleural effusion.  There are no acute findings   elsewhere in the chest.   2. Gastric tube in place which extends a short distance in the stomach with   the side port 2-3 cm above the domingo.  Advancement is recommended.         XR CHEST PORTABLE   Final Result   1. Bibasilar atelectasis.   2. Possible trace left pleural effusion.         XR ABDOMEN (2 VIEWS)   Final Result   Stable to slightly improved small bowel dilation.  No free air.         MRI BRAIN W WO CONTRAST         CT HEAD WO CONTRAST   Final Result   No acute intracranial abnormality.      Minimal fluid in the left maxillary sinus and left mastoid air cells.         XR ABDOMEN (2 VIEWS)   Final Result   No significant change in appearance of a suspected distal small bowel   obstruction, without evidence of free air.         XR ABDOMEN (2 VIEWS)   Final Result   Dilated loops of small bowel appear unchanged from the prior study compatible   with partial small bowel obstruction.         XR

## 2025-02-13 NOTE — PROGRESS NOTES
Care rounds completed with Dr. Romero and multidisciplinary team. Reviewed labs, meds, VS, assessment, & plan of care for today. See dictated note and new orders for details.     -start zosyn  -cultures  -ABG  -decrease precedex   -increase librium dose

## 2025-02-13 NOTE — PROGRESS NOTES
RN will give oral contrast around 0900; patient to come down between 0584-4222. Delayed images x 4 hours. RN will call when ready for CT a/p

## 2025-02-13 NOTE — PROGRESS NOTES
GI consult called, MD aware and updated.    BP soft, precedex gtt turned off. Pt RASS -1, oriented to person and sometimes place.    Hadoff given to Maile, all questions answered.

## 2025-02-13 NOTE — PROGRESS NOTES
AM assessment complete, see flowsheet. Medications given per MAR. Pt remains agitated at times. On precedex gtt. NG tube clamped. IV lines and monitors checked and tightened. VSS. On 4L NC, accessory muscle use with breathing. Will discuss condition with MD during rounds.    Sx ordered CT of Abd. Gastrogafin given at 0900, plans for CT in 4 hours.    Telesitter in room, will continue to monitor and assess.

## 2025-02-13 NOTE — PROGRESS NOTES
Pulmonary Medicine Progress Note :                                                               CC: COPD and flu and SOB     Subjective:  Precedex 1.3-->1.5 mcg/kg/hr   4L- uses 4 at home   No drips   D5W 50 cc/hr   Worse mentation today         Intake/Output Summary (Last 24 hours) at 2/13/2025 0617  Last data filed at 2/12/2025 1849  Gross per 24 hour   Intake 12.44 ml   Output 300 ml   Net -287.56 ml         PHYSICAL EXAM:  /67   Pulse 84   Temp 98 °F (36.7 °C)   Resp 18   Ht 1.702 m (5' 7.01\")   Wt 88.6 kg (195 lb 5.2 oz)   SpO2 98%   BMI 30.59 kg/m²  4 L  Constitutional:  No acute distress.   Eyes: PERRL. Conjunctivae anicteric.   ENT: Normal nose. Normal tongue.    Neck:  Trachea is midline.   Respiratory: No accessory muscle usage.   Decreased breath sounds. Few wheezes. No rales. +  Rhonchi.  Cardiovascular: Normal S1S2. No digit clubbing. No digit cyanosis. No LE edema.   Psychiatric: Lethargic today     LABS:  CBC:   Recent Labs     02/11/25  0532 02/12/25  0533   WBC 8.4 8.1   HGB 8.2* 8.2*   HCT 24.5* 25.0*   MCV 94.1 94.3    226     BMP:   Recent Labs     02/11/25  0532 02/12/25  0533 02/13/25  0450    141 139   K 3.9 3.6 4.2  4.2    102 102   CO2 29 32 27   PHOS  --   --  3.6   BUN 16 16 16   CREATININE 0.7* 0.8 0.9     LIVER PROFILE:   Recent Labs     02/13/25  0450   AST 22   ALT 9*   BILIDIR 0.3   BILITOT 0.6   ALKPHOS 95       Microbiology:  1/20 Flu +   2/5 Strep and leg negative       Imaging:  CXR 2/8 images independently reviewed by me and showed:  No acute cardiopulmonary disease     ASSESSMENT:  Acute COPD exacerbation  Acute encephalopathy/Metabolic encephalopathy - worse today   Pulmonary congestion-suspecting developing respiratory infection  Alcohol withdrawal with Delirium tremens  Chronic hypoxia on 4 L O2  Hypernatremia   Flu A + 1/30  Electrolytes disorder   Tobacco abuse  Ileus  Agitation - suspecting alcohol

## 2025-02-13 NOTE — PROGRESS NOTES
Speech Language Pathology  Attempt Note     Name: Sandor Early  : 1955  Medical Diagnosis: Hypomagnesemia [E83.42]  Influenza A [J10.1]  COPD exacerbation (HCC) [J44.1]  Respiratory failure with hypoxia [J96.91]  Alcohol use disorder [F10.90]  Acute on chronic respiratory failure with hypoxia and hypercapnia [J96.21, J96.22]  Acute respiratory failure [J96.00]      SLP attempted to see pt for dysphagia tx. SLP spoke with JoseR/CONSTANTINO who confirms pt NPO for procedure so unable to participate in po assessment at this time. Pt more confused this day. SLP to re-attempt as pt's condition and schedule allows. RN aware. No charges.    Thank you,    Tayla Shelby M.A. SLP  Speech-Language Pathologist  [place] Lic #[id number]  [phone]

## 2025-02-13 NOTE — PROGRESS NOTES
Neurology Progress Note    ID: Sandor Early is a 69 y.o. male    : 1955     LOS: 14 days     ASSESSMENT    Principal Problem:    Respiratory failure with hypoxia  Active Problems:    Seizure (HCC)    Tobacco abuse    Acute on chronic respiratory failure with hypoxia and hypercapnia    Diabetes mellitus (HCC)    Acute respiratory failure    Influenza A    Alcohol use disorder    Hypomagnesemia    SBO (small bowel obstruction) (HCC)    Acute encephalopathy    Hypernatremia    Disorder of electrolytes    Ileus (HCC)    Agitation    Chest pain    Alcohol withdrawal syndrome, with delirium ([facility])  Resolved Problems:    * No resolved hospital problems. *    The patient remains altered without focal neurological deficits.  CT brain showed generalized brain atrophy.  EEG showed moderate encephalopathy.  Follow-up EEG on  still showed significant encephalopathy.    Based on the history, the pathology of seizure-like activity is unclear.  The history of bilateral shaking with retained awareness is not typical for epileptic event.    25: The patient remains confused.  The patient remains on low-dose Precedex.  There is no focal weakness or numbness during my assessment outside encephalopathy.    25: The patient is less confused when compared to yesterday.  The patient is able to follow simple commands.  There has been no seizure-like activity reported so far.    25: The patient is able to follow commands.  However, the patient remains intermittently confused for the most part.    From neurology perspective, the patient may take a long recovery based on the EEG and brain scan which showed significant brain atrophy.    PLAN    1.  Switch levetiracetam to zonisamide to lower the risk of behavioral disturbance.  2.  Seizure precaution.  3.  Treatment of infection per primary team.  4.  Ativan 2 mg as needed for breakthrough seizure.  5.  Minimize sedation and anticoagulant medication.  6.

## 2025-02-13 NOTE — PROGRESS NOTES
RT Inhaler-Nebulizer Bronchodilator Protocol Note    There is a bronchodilator order in the chart from a provider indicating to follow the RT Bronchodilator Protocol and there is an “Initiate RT Inhaler-Nebulizer Bronchodilator Protocol” order as well (see protocol at bottom of note).    CXR Findings:  No results found.    The findings from the last RT Protocol Assessment were as follows:   History Pulmonary Disease: Chronic pulmonary disease  Respiratory Pattern: Dyspnea on exertion or RR 21-25 bpm  Breath Sounds: Inspiratory and expiratory or bilateral wheezing and/or rhonchi  Cough: Strong, spontaneous, non-productive  Indication for Bronchodilator Therapy: Decreased or absent breath sounds  Bronchodilator Assessment Score: 10    Aerosolized bronchodilator medication orders have been revised according to the RT Inhaler-Nebulizer Bronchodilator Protocol below.    Respiratory Therapist to perform RT Therapy Protocol Assessment initially then follow the protocol.  Repeat RT Therapy Protocol Assessment PRN for score 0-3 or on second treatment, BID, and PRN for scores above 3.    No Indications - adjust the frequency to every 6 hours PRN wheezing or bronchospasm, if no treatments needed after 48 hours then discontinue using Per Protocol order mode.     If indication present, adjust the RT bronchodilator orders based on the Bronchodilator Assessment Score as indicated below.  Use Inhaler orders unless patient has one or more of the following: on home nebulizer, not able to hold breath for 10 seconds, is not alert and oriented, cannot activate and use MDI correctly, or respiratory rate 25 breaths per minute or more, then use the equivalent nebulizer order(s) with same Frequency and PRN reasons based on the score.  If a patient is on this medication at home then do not decrease Frequency below that used at home.    0-3 - enter or revise RT bronchodilator order(s) to equivalent RT Bronchodilator order with Frequency of

## 2025-02-14 ENCOUNTER — APPOINTMENT (OUTPATIENT)
Dept: GENERAL RADIOLOGY | Age: 70
DRG: 207 | End: 2025-02-14
Payer: MEDICARE

## 2025-02-14 PROBLEM — N17.9 ACUTE KIDNEY INJURY: Status: ACTIVE | Noted: 2025-02-14

## 2025-02-14 LAB
ALBUMIN SERPL-MCNC: 1.9 G/DL (ref 3.4–5)
ALP SERPL-CCNC: 136 U/L (ref 40–129)
ALT SERPL-CCNC: 11 U/L (ref 10–40)
ANION GAP SERPL CALCULATED.3IONS-SCNC: 12 MMOL/L (ref 3–16)
AST SERPL-CCNC: 36 U/L (ref 15–37)
BASE EXCESS BLDA CALC-SCNC: -1.7 MMOL/L (ref -3–3)
BASE EXCESS BLDA CALC-SCNC: -3 MMOL/L (ref -3–3)
BASOPHILS # BLD: 0.1 K/UL (ref 0–0.2)
BASOPHILS NFR BLD: 0.8 %
BILIRUB DIRECT SERPL-MCNC: 0.2 MG/DL (ref 0–0.3)
BILIRUB INDIRECT SERPL-MCNC: 0.7 MG/DL (ref 0–1)
BILIRUB SERPL-MCNC: 0.9 MG/DL (ref 0–1)
BUN SERPL-MCNC: 28 MG/DL (ref 7–20)
CALCIUM SERPL-MCNC: 7.2 MG/DL (ref 8.3–10.6)
CHLORIDE SERPL-SCNC: 100 MMOL/L (ref 99–110)
CO2 BLDA-SCNC: 27.5 MMOL/L
CO2 BLDA-SCNC: 28.5 MMOL/L
CO2 SERPL-SCNC: 25 MMOL/L (ref 21–32)
COHGB MFR BLDA: 0.3 % (ref 0–1.5)
COHGB MFR BLDA: 0.3 % (ref 0–1.5)
CREAT SERPL-MCNC: 1.5 MG/DL (ref 0.8–1.3)
DEPRECATED RDW RBC AUTO: 16.1 % (ref 12.4–15.4)
EKG ATRIAL RATE: 130 BPM
EKG ATRIAL RATE: 144 BPM
EKG DIAGNOSIS: NORMAL
EKG DIAGNOSIS: NORMAL
EKG P AXIS: 38 DEGREES
EKG P-R INTERVAL: 144 MS
EKG P-R INTERVAL: 152 MS
EKG Q-T INTERVAL: 294 MS
EKG Q-T INTERVAL: 294 MS
EKG QRS DURATION: 78 MS
EKG QRS DURATION: 78 MS
EKG QTC CALCULATION (BAZETT): 432 MS
EKG QTC CALCULATION (BAZETT): 455 MS
EKG R AXIS: -42 DEGREES
EKG R AXIS: -47 DEGREES
EKG T AXIS: 65 DEGREES
EKG T AXIS: 73 DEGREES
EKG VENTRICULAR RATE: 130 BPM
EKG VENTRICULAR RATE: 144 BPM
EOSINOPHIL # BLD: 0.2 K/UL (ref 0–0.6)
EOSINOPHIL NFR BLD: 1.5 %
GFR SERPLBLD CREATININE-BSD FMLA CKD-EPI: 50 ML/MIN/{1.73_M2}
GLUCOSE BLD-MCNC: 106 MG/DL (ref 70–99)
GLUCOSE BLD-MCNC: 109 MG/DL (ref 70–99)
GLUCOSE BLD-MCNC: 110 MG/DL (ref 70–99)
GLUCOSE BLD-MCNC: 114 MG/DL (ref 70–99)
GLUCOSE BLD-MCNC: 127 MG/DL (ref 70–99)
GLUCOSE BLD-MCNC: 151 MG/DL (ref 70–99)
GLUCOSE SERPL-MCNC: 90 MG/DL (ref 70–99)
HCO3 BLDA-SCNC: 25.7 MMOL/L (ref 21–29)
HCO3 BLDA-SCNC: 26.2 MMOL/L (ref 21–29)
HCT VFR BLD AUTO: 27.3 % (ref 40.5–52.5)
HGB BLD-MCNC: 8.9 G/DL (ref 13.5–17.5)
HGB BLDA-MCNC: 8.4 G/DL (ref 13.5–17.5)
HGB BLDA-MCNC: 8.9 G/DL (ref 13.5–17.5)
LYMPHOCYTES # BLD: 1.1 K/UL (ref 1–5.1)
LYMPHOCYTES NFR BLD: 7.9 %
MAGNESIUM SERPL-MCNC: 1.86 MG/DL (ref 1.8–2.4)
MCH RBC QN AUTO: 30.5 PG (ref 26–34)
MCHC RBC AUTO-ENTMCNC: 32.6 G/DL (ref 31–36)
MCV RBC AUTO: 93.4 FL (ref 80–100)
METHGB MFR BLDA: 0 %
METHGB MFR BLDA: 0 %
MONOCYTES # BLD: 1.3 K/UL (ref 0–1.3)
MONOCYTES NFR BLD: 9.7 %
MRSA DNA SPEC QL NAA+PROBE: ABNORMAL
NEUTROPHILS # BLD: 10.9 K/UL (ref 1.7–7.7)
NEUTROPHILS NFR BLD: 80.1 %
O2 THERAPY: ABNORMAL
O2 THERAPY: ABNORMAL
ORGANISM: ABNORMAL
PCO2 BLDA: 58.8 MMHG (ref 35–45)
PCO2 BLDA: 75.1 MMHG (ref 35–45)
PERFORMED ON: ABNORMAL
PH BLDA: 7.16 [PH] (ref 7.35–7.45)
PH BLDA: 7.26 [PH] (ref 7.35–7.45)
PHOSPHATE SERPL-MCNC: 5.1 MG/DL (ref 2.5–4.9)
PLATELET # BLD AUTO: 284 K/UL (ref 135–450)
PMV BLD AUTO: 8.8 FL (ref 5–10.5)
PO2 BLDA: 141.4 MMHG (ref 75–108)
PO2 BLDA: 74.1 MMHG (ref 75–108)
POTASSIUM SERPL-SCNC: 4.7 MMOL/L (ref 3.5–5.1)
PROT SERPL-MCNC: 5.8 G/DL (ref 6.4–8.2)
RBC # BLD AUTO: 2.92 M/UL (ref 4.2–5.9)
SAO2 % BLDA: 89.9 %
SAO2 % BLDA: 98.4 %
SODIUM SERPL-SCNC: 137 MMOL/L (ref 136–145)
WBC # BLD AUTO: 13.6 K/UL (ref 4–11)

## 2025-02-14 PROCEDURE — 93010 ELECTROCARDIOGRAM REPORT: CPT | Performed by: INTERNAL MEDICINE

## 2025-02-14 PROCEDURE — 31500 INSERT EMERGENCY AIRWAY: CPT

## 2025-02-14 PROCEDURE — 2500000003 HC RX 250 WO HCPCS: Performed by: INTERNAL MEDICINE

## 2025-02-14 PROCEDURE — 36556 INSERT NON-TUNNEL CV CATH: CPT | Performed by: INTERNAL MEDICINE

## 2025-02-14 PROCEDURE — 82803 BLOOD GASES ANY COMBINATION: CPT

## 2025-02-14 PROCEDURE — 6360000002 HC RX W HCPCS: Performed by: INTERNAL MEDICINE

## 2025-02-14 PROCEDURE — 80076 HEPATIC FUNCTION PANEL: CPT

## 2025-02-14 PROCEDURE — 85025 COMPLETE CBC W/AUTO DIFF WBC: CPT

## 2025-02-14 PROCEDURE — 99232 SBSQ HOSP IP/OBS MODERATE 35: CPT | Performed by: SURGERY

## 2025-02-14 PROCEDURE — 51798 US URINE CAPACITY MEASURE: CPT

## 2025-02-14 PROCEDURE — 51701 INSERT BLADDER CATHETER: CPT

## 2025-02-14 PROCEDURE — 99291 CRITICAL CARE FIRST HOUR: CPT | Performed by: INTERNAL MEDICINE

## 2025-02-14 PROCEDURE — 5A1955Z RESPIRATORY VENTILATION, GREATER THAN 96 CONSECUTIVE HOURS: ICD-10-PCS

## 2025-02-14 PROCEDURE — 0BH17EZ INSERTION OF ENDOTRACHEAL AIRWAY INTO TRACHEA, VIA NATURAL OR ARTIFICIAL OPENING: ICD-10-PCS | Performed by: INTERNAL MEDICINE

## 2025-02-14 PROCEDURE — 36556 INSERT NON-TUNNEL CV CATH: CPT

## 2025-02-14 PROCEDURE — 71045 X-RAY EXAM CHEST 1 VIEW: CPT

## 2025-02-14 PROCEDURE — 2500000003 HC RX 250 WO HCPCS: Performed by: STUDENT IN AN ORGANIZED HEALTH CARE EDUCATION/TRAINING PROGRAM

## 2025-02-14 PROCEDURE — 94761 N-INVAS EAR/PLS OXIMETRY MLT: CPT

## 2025-02-14 PROCEDURE — 99233 SBSQ HOSP IP/OBS HIGH 50: CPT | Performed by: INTERNAL MEDICINE

## 2025-02-14 PROCEDURE — 51702 INSERT TEMP BLADDER CATH: CPT

## 2025-02-14 PROCEDURE — 2580000003 HC RX 258: Performed by: INTERNAL MEDICINE

## 2025-02-14 PROCEDURE — 83735 ASSAY OF MAGNESIUM: CPT

## 2025-02-14 PROCEDURE — 02HV33Z INSERTION OF INFUSION DEVICE INTO SUPERIOR VENA CAVA, PERCUTANEOUS APPROACH: ICD-10-PCS

## 2025-02-14 PROCEDURE — 2700000000 HC OXYGEN THERAPY PER DAY

## 2025-02-14 PROCEDURE — 74019 RADEX ABDOMEN 2 VIEWS: CPT

## 2025-02-14 PROCEDURE — 93005 ELECTROCARDIOGRAM TRACING: CPT | Performed by: INTERNAL MEDICINE

## 2025-02-14 PROCEDURE — 2500000003 HC RX 250 WO HCPCS: Performed by: SURGERY

## 2025-02-14 PROCEDURE — 31500 INSERT EMERGENCY AIRWAY: CPT | Performed by: INTERNAL MEDICINE

## 2025-02-14 PROCEDURE — 6370000000 HC RX 637 (ALT 250 FOR IP): Performed by: INTERNAL MEDICINE

## 2025-02-14 PROCEDURE — 02HV33Z INSERTION OF INFUSION DEVICE INTO SUPERIOR VENA CAVA, PERCUTANEOUS APPROACH: ICD-10-PCS | Performed by: INTERNAL MEDICINE

## 2025-02-14 PROCEDURE — 6370000000 HC RX 637 (ALT 250 FOR IP): Performed by: PSYCHIATRY & NEUROLOGY

## 2025-02-14 PROCEDURE — 80048 BASIC METABOLIC PNL TOTAL CA: CPT

## 2025-02-14 PROCEDURE — 2000000000 HC ICU R&B

## 2025-02-14 PROCEDURE — 6370000000 HC RX 637 (ALT 250 FOR IP): Performed by: PEDIATRICS

## 2025-02-14 PROCEDURE — 84100 ASSAY OF PHOSPHORUS: CPT

## 2025-02-14 PROCEDURE — 36415 COLL VENOUS BLD VENIPUNCTURE: CPT

## 2025-02-14 PROCEDURE — 94002 VENT MGMT INPAT INIT DAY: CPT

## 2025-02-14 PROCEDURE — 94640 AIRWAY INHALATION TREATMENT: CPT

## 2025-02-14 PROCEDURE — 0D9670Z DRAINAGE OF STOMACH WITH DRAINAGE DEVICE, VIA NATURAL OR ARTIFICIAL OPENING: ICD-10-PCS | Performed by: INTERNAL MEDICINE

## 2025-02-14 RX ORDER — PROPOFOL 10 MG/ML
5-50 INJECTION, EMULSION INTRAVENOUS CONTINUOUS
Status: DISCONTINUED | OUTPATIENT
Start: 2025-02-14 | End: 2025-02-26

## 2025-02-14 RX ORDER — MIDAZOLAM HYDROCHLORIDE 1 MG/ML
2 INJECTION, SOLUTION INTRAMUSCULAR; INTRAVENOUS
Status: DISCONTINUED | OUTPATIENT
Start: 2025-02-14 | End: 2025-03-04

## 2025-02-14 RX ORDER — SODIUM CHLORIDE 9 MG/ML
INJECTION, SOLUTION INTRAVENOUS CONTINUOUS
Status: DISCONTINUED | OUTPATIENT
Start: 2025-02-14 | End: 2025-02-16

## 2025-02-14 RX ORDER — MIDAZOLAM HYDROCHLORIDE 1 MG/ML
5 INJECTION, SOLUTION INTRAMUSCULAR; INTRAVENOUS ONCE
Status: DISCONTINUED | OUTPATIENT
Start: 2025-02-14 | End: 2025-02-16

## 2025-02-14 RX ORDER — METOPROLOL TARTRATE 1 MG/ML
5 INJECTION, SOLUTION INTRAVENOUS EVERY 6 HOURS
Status: DISCONTINUED | OUTPATIENT
Start: 2025-02-14 | End: 2025-02-15

## 2025-02-14 RX ORDER — KETAMINE HYDROCHLORIDE 100 MG/ML
1 INJECTION, SOLUTION INTRAMUSCULAR; INTRAVENOUS ONCE
Status: COMPLETED | OUTPATIENT
Start: 2025-02-14 | End: 2025-02-14

## 2025-02-14 RX ORDER — FENTANYL CITRATE 50 UG/ML
100 INJECTION, SOLUTION INTRAMUSCULAR; INTRAVENOUS ONCE
Status: DISCONTINUED | OUTPATIENT
Start: 2025-02-14 | End: 2025-02-15

## 2025-02-14 RX ORDER — FLUCONAZOLE 2 MG/ML
200 INJECTION, SOLUTION INTRAVENOUS EVERY 24 HOURS
Status: DISCONTINUED | OUTPATIENT
Start: 2025-02-14 | End: 2025-02-20

## 2025-02-14 RX ORDER — ALBUTEROL SULFATE 0.83 MG/ML
2.5 SOLUTION RESPIRATORY (INHALATION)
Status: DISCONTINUED | OUTPATIENT
Start: 2025-02-14 | End: 2025-02-15

## 2025-02-14 RX ORDER — ROCURONIUM BROMIDE 10 MG/ML
1 INJECTION, SOLUTION INTRAVENOUS ONCE
Status: COMPLETED | OUTPATIENT
Start: 2025-02-14 | End: 2025-02-14

## 2025-02-14 RX ORDER — FENTANYL CITRATE 50 UG/ML
50 INJECTION, SOLUTION INTRAMUSCULAR; INTRAVENOUS
Status: DISCONTINUED | OUTPATIENT
Start: 2025-02-14 | End: 2025-03-04

## 2025-02-14 RX ORDER — DEXMEDETOMIDINE HYDROCHLORIDE 4 UG/ML
.1-1.5 INJECTION, SOLUTION INTRAVENOUS CONTINUOUS
Status: DISCONTINUED | OUTPATIENT
Start: 2025-02-14 | End: 2025-02-14

## 2025-02-14 RX ADMIN — PROPOFOL 20 MCG/KG/MIN: 10 INJECTION, EMULSION INTRAVENOUS at 11:52

## 2025-02-14 RX ADMIN — SODIUM CHLORIDE: 9 INJECTION, SOLUTION INTRAVENOUS at 14:27

## 2025-02-14 RX ADMIN — ALBUTEROL SULFATE 2.5 MG: 0.83 SOLUTION RESPIRATORY (INHALATION) at 07:44

## 2025-02-14 RX ADMIN — FOLIC ACID 1000 MCG: 1 TABLET ORAL at 08:56

## 2025-02-14 RX ADMIN — THERA TABS 1 TABLET: TAB at 08:56

## 2025-02-14 RX ADMIN — PIPERACILLIN AND TAZOBACTAM 3375 MG: 3; .375 INJECTION, POWDER, LYOPHILIZED, FOR SOLUTION INTRAVENOUS at 00:39

## 2025-02-14 RX ADMIN — SODIUM CHLORIDE, PRESERVATIVE FREE 10 ML: 5 INJECTION INTRAVENOUS at 08:57

## 2025-02-14 RX ADMIN — METOPROLOL TARTRATE 5 MG: 5 INJECTION, SOLUTION INTRAVENOUS at 13:28

## 2025-02-14 RX ADMIN — PROPOFOL 20 MCG/KG/MIN: 10 INJECTION, EMULSION INTRAVENOUS at 18:58

## 2025-02-14 RX ADMIN — ENOXAPARIN SODIUM 40 MG: 100 INJECTION SUBCUTANEOUS at 08:56

## 2025-02-14 RX ADMIN — PANTOPRAZOLE SODIUM 40 MG: 40 INJECTION, POWDER, LYOPHILIZED, FOR SOLUTION INTRAVENOUS at 08:56

## 2025-02-14 RX ADMIN — ALBUTEROL SULFATE 2.5 MG: 2.5 SOLUTION RESPIRATORY (INHALATION) at 12:51

## 2025-02-14 RX ADMIN — METOPROLOL TARTRATE 5 MG: 5 INJECTION, SOLUTION INTRAVENOUS at 20:33

## 2025-02-14 RX ADMIN — TIOTROPIUM BROMIDE INHALATION SPRAY 2 PUFF: 3.12 SPRAY, METERED RESPIRATORY (INHALATION) at 07:45

## 2025-02-14 RX ADMIN — BUDESONIDE AND FORMOTEROL FUMARATE DIHYDRATE 2 PUFF: 160; 4.5 AEROSOL RESPIRATORY (INHALATION) at 07:45

## 2025-02-14 RX ADMIN — THIAMINE HYDROCHLORIDE 200 MG: 100 INJECTION, SOLUTION INTRAMUSCULAR; INTRAVENOUS at 12:16

## 2025-02-14 RX ADMIN — PIPERACILLIN AND TAZOBACTAM 3375 MG: 3; .375 INJECTION, POWDER, LYOPHILIZED, FOR SOLUTION INTRAVENOUS at 17:23

## 2025-02-14 RX ADMIN — MUPIROCIN: 20 OINTMENT TOPICAL at 08:56

## 2025-02-14 RX ADMIN — ASCORBIC ACID, VITAMIN A PALMITATE, CHOLECALCIFEROL, THIAMINE HYDROCHLORIDE, RIBOFLAVIN-5 PHOSPHATE SODIUM, PYRIDOXINE HYDROCHLORIDE, NIACINAMIDE, DEXPANTHENOL, ALPHA-TOCOPHEROL ACETATE, VITAMIN K1, FOLIC ACID, BIOTIN, CYANOCOBALAMIN: 200; 3300; 200; 6; 3.6; 6; 40; 15; 10; 150; 600; 60; 5 INJECTION, SOLUTION INTRAVENOUS at 17:20

## 2025-02-14 RX ADMIN — DEXMEDETOMIDINE HYDROCHLORIDE 0.2 MCG/KG/HR: 4 INJECTION, SOLUTION INTRAVENOUS at 10:20

## 2025-02-14 RX ADMIN — SODIUM CHLORIDE: 9 INJECTION, SOLUTION INTRAVENOUS at 11:13

## 2025-02-14 RX ADMIN — ALLOPURINOL 300 MG: 300 TABLET ORAL at 08:56

## 2025-02-14 RX ADMIN — ZONISAMIDE 100 MG: 100 CAPSULE ORAL at 08:55

## 2025-02-14 RX ADMIN — Medication 5000 UNITS: at 08:56

## 2025-02-14 RX ADMIN — ROCURONIUM BROMIDE 89 MG: 10 INJECTION, SOLUTION INTRAVENOUS at 11:57

## 2025-02-14 RX ADMIN — SODIUM CHLORIDE, PRESERVATIVE FREE 10 ML: 5 INJECTION INTRAVENOUS at 08:54

## 2025-02-14 RX ADMIN — DULOXETINE 60 MG: 60 CAPSULE, DELAYED RELEASE ORAL at 08:56

## 2025-02-14 RX ADMIN — INSULIN GLARGINE 7 UNITS: 100 INJECTION, SOLUTION SUBCUTANEOUS at 20:33

## 2025-02-14 RX ADMIN — MUPIROCIN: 20 OINTMENT TOPICAL at 20:34

## 2025-02-14 RX ADMIN — PIPERACILLIN AND TAZOBACTAM 3375 MG: 3; .375 INJECTION, POWDER, LYOPHILIZED, FOR SOLUTION INTRAVENOUS at 08:54

## 2025-02-14 RX ADMIN — ATORVASTATIN CALCIUM 40 MG: 40 TABLET, FILM COATED ORAL at 08:56

## 2025-02-14 RX ADMIN — FERROUS SULFATE TAB 325 MG (65 MG ELEMENTAL FE) 325 MG: 325 (65 FE) TAB at 08:56

## 2025-02-14 RX ADMIN — METOPROLOL TARTRATE 5 MG: 5 INJECTION, SOLUTION INTRAVENOUS at 08:56

## 2025-02-14 RX ADMIN — ALBUTEROL SULFATE 2.5 MG: 2.5 SOLUTION RESPIRATORY (INHALATION) at 20:32

## 2025-02-14 RX ADMIN — ALBUTEROL SULFATE 2.5 MG: 2.5 SOLUTION RESPIRATORY (INHALATION) at 15:12

## 2025-02-14 RX ADMIN — I.V. FAT EMULSION 250 ML: 20 EMULSION INTRAVENOUS at 17:13

## 2025-02-14 RX ADMIN — FLUCONAZOLE 200 MG: 2 INJECTION, SOLUTION INTRAVENOUS at 11:17

## 2025-02-14 RX ADMIN — FERROUS SULFATE TAB 325 MG (65 MG ELEMENTAL FE) 325 MG: 325 (65 FE) TAB at 17:08

## 2025-02-14 RX ADMIN — KETAMINE HYDROCHLORIDE 90 MG: 100 INJECTION INTRAMUSCULAR; INTRAVENOUS at 11:53

## 2025-02-14 ASSESSMENT — PULMONARY FUNCTION TESTS
PIF_VALUE: 38
PIF_VALUE: 32
PIF_VALUE: 33
PIF_VALUE: 22
PIF_VALUE: 32
PIF_VALUE: 34

## 2025-02-14 NOTE — PROGRESS NOTES
Comprehensive Nutrition Assessment    Type and Reason for Visit:  Reassess    Nutrition Recommendations/Plan:   Start TPN regimen today - Clinimix 5/20 E with a goal rate of 83 ml/hr x 24 hours. Start with 40 ml/hr and increase to goal rate on second day of TPN infusion or when second bag of TPN mixture is hung. Recommend 250 ml of 20% lipids bi-weekly.   Monitor TPN start, rate, intake, and tolerance + administration of lipids bi-weekly.   Monitor respiratory/vent status, sedation type/amount (propofol is infusing at 20 mcg x 24 hours which = 281 kcals from lipids), TG checks, and plan of care.   Monitor nutrition-related labs, bowel function + GI status, and weight trends.      Malnutrition Assessment:  Malnutrition Status:  At risk for malnutrition (02/14/25 1443)    Context:  Acute Illness     Findings of the 6 clinical characteristics of malnutrition:  Energy Intake:  50% or less of estimated energy requirements for 5 or more days  Weight Loss:  Mild weight loss (-8# or 4.5% weight loss since 1/31/25)     Body Fat Loss:  Unable to assess     Muscle Mass Loss:  Unable to assess    Fluid Accumulation:  No fluid accumulation     Strength:  Not Performed    Nutrition Assessment:    patient continues to decline from a nutritional standpoint AEB patient remains in NPO status, he required intubation this am, and altered GI function continues; patient remains at risk for further compromise d/t NPO status, inadequate po intake since 2/4/25, and ongoing altered GI function; will continue NPO status and TPN will be started today    Nutrition Related Findings:    patient was intubated this am after rounds; his mentation prior to being intubated was A & O to person and speech was slurred and difficult to understand patient + he was yelling out; propofol is infusing at 20 mcg x 24 hours currently; + BM on 2/10/25; TPN to be started today; NS at 100 ml/hr; on 7 units Lantus nightly and low-dose SSI Wound Type: None

## 2025-02-14 NOTE — CONSULTS
Consultation Note    Patient Name: Sandor Early  : 1955  Age: 69 y.o.     Admitting Physician: Drake Jalloh*   Date of Admission: 2025  5:28 PM   Primary Care Physician: Juliet Mayorga MD        Sandor Early is being seen at the request of Drake Jalloh* for abdominal distention, small bowel obstruction, worsening inflammatory changes involving the ileum.    History of Present Illness:  69-year-old male initially admitted with hypoxia and shortness of breath.  Patient has a history of congestive heart failure, COPD, diabetes, hypertension and Whyte cirrhosis.  He developed progressive abdominal distention and abdominal discomfort.  Was thoroughly evaluated during this admission with repeated x-rays and CT scan of the abdomen pelvis.  Initial CT revealed some inflammatory changes around the terminal ileum with a rim-enhancing tubular fluid collection in the region of the cecum extending in the distal terminal ileum.  Patient was managed conservatively.  A repeat CT scan on 2025 shows progressive inflammatory changes in the distal terminal ileum, partial small bowel obstruction, no evidence of perforation, a slight increase in the size of the small fluid collection adjacent to the terminal ileum.  Severe bladder mucosal thickening was also noted.  Patient has no history of ileal disease.  He has no history of Crohn's disease.  He had a colonoscopy with Dr. Gifford on 5/10/2022.  Diverticular disease was noted.  The ileum was normal.  Patient is critically ill, in the intensive care unit.  NG tube to suction.  Unable to provide a history.  Family at bedside.    GI History:  Colonoscopy with Dr. Gifford on 5/10/2022.  Diverticular disease.  No polyps.  Normal terminal ileum.    Past Medical History:  Past Medical History:   Diagnosis Date    Bladder outlet obstruction 2017    Carpal tunnel syndrome of left wrist 2013    Cellulitis of right lower extremity   0.3   BUN 16 16 16   CREATININE 0.7* 0.8 0.9    141 139   K 3.9 3.6 4.2  4.2   INR  --  1.09  --         Assessment:    Hospital Problems             Last Modified POA    * (Principal) Respiratory failure with hypoxia 1/30/2025 Yes    Seizure (HCC) 2/10/2025 Yes    Overview Signed 1/7/2013  3:28 PM by Cooper Martinez MD     ongoing, began after swine flu vaccination         Tobacco abuse 2/3/2025 Yes    Acute on chronic respiratory failure with hypoxia and hypercapnia 1/31/2025 Yes    Diabetes mellitus (HCC) 1/31/2025 Yes    Acute respiratory failure 1/30/2025 Yes    Influenza A 1/31/2025 Yes    Alcohol use disorder 2/1/2025 Yes    Hypomagnesemia 2/1/2025 Yes    SBO (small bowel obstruction) ([facility]) 2/5/2025 Yes    Acute encephalopathy 2/7/2025 Yes    Hypernatremia 2/10/2025 Yes    Disorder of electrolytes 2/10/2025 Yes    Ileus ([facility]) 2/10/2025 Yes    Agitation 2/10/2025 Yes    Chest pain 2/10/2025 Yes    Alcohol withdrawal syndrome, with delirium ([facility]) 2/11/2025 Yes    Pulmonary congestion 2/13/2025 Yes       69-year-old male admitted with shortness of breath, evaluated for persistent abdominal distention, worsening inflammatory changes involving the terminal ileum on CT.  Patient has no history of inflammatory bowel disease.  He is on IV antibiotics.  Patient unable to provide any history.  However review of his records and review of multiple order CAT scans of the abdomen pelvis did not show any evidence of laboratory bowel disease.  His CT in January showed a normal terminal ileum.  Patient had progressive inflammatory changes involving a long segment of the terminal ileum.  CT scans were discussed and reviewed with radiology.  The etiology of the worsening inflammatory changes in the ileum is unclear.  Main differential includes an infectious etiology versus an ischemic injury.  Patient is significantly  vasculopathic.  He he is also on antibiotics    Assessment and plan:  1.  Worsening

## 2025-02-14 NOTE — PROGRESS NOTES
RT Inhaler-Nebulizer Bronchodilator Protocol Note    There is a bronchodilator order in the chart from a provider indicating to follow the RT Bronchodilator Protocol and there is an “Initiate RT Inhaler-Nebulizer Bronchodilator Protocol” order as well (see protocol at bottom of note).    CXR Findings:  XR CHEST PORTABLE    Result Date: 2/13/2025  Interstitial thickening is likely due to mild pulmonary edema.       The findings from the last RT Protocol Assessment were as follows:   History Pulmonary Disease: Chronic pulmonary disease  Respiratory Pattern: Dyspnea on exertion or RR 21-25 bpm  Breath Sounds: Inspiratory and expiratory or bilateral wheezing and/or rhonchi  Cough: Strong, spontaneous, non-productive  Indication for Bronchodilator Therapy: Decreased or absent breath sounds  Bronchodilator Assessment Score: 10    Aerosolized bronchodilator medication orders have been revised according to the RT Inhaler-Nebulizer Bronchodilator Protocol below.    Respiratory Therapist to perform RT Therapy Protocol Assessment initially then follow the protocol.  Repeat RT Therapy Protocol Assessment PRN for score 0-3 or on second treatment, BID, and PRN for scores above 3.    No Indications - adjust the frequency to every 6 hours PRN wheezing or bronchospasm, if no treatments needed after 48 hours then discontinue using Per Protocol order mode.     If indication present, adjust the RT bronchodilator orders based on the Bronchodilator Assessment Score as indicated below.  Use Inhaler orders unless patient has one or more of the following: on home nebulizer, not able to hold breath for 10 seconds, is not alert and oriented, cannot activate and use MDI correctly, or respiratory rate 25 breaths per minute or more, then use the equivalent nebulizer order(s) with same Frequency and PRN reasons based on the score.  If a patient is on this medication at home then do not decrease Frequency below that used at home.    0-3 - enter or

## 2025-02-14 NOTE — PROCEDURES
Procedure: central venous access, nontunneled left IJ      Indication: invasive hemodynamic monitoring, frequent blood draws, ensure stable IV access    Informed Consent: was obtained from family    Time Out: taken    Procedure: Sterile prep with chlorhexidine.  Full maximum sterile field/barrier technique was followed (with cap and mask and sterile gown and large sterile sheet and hand hygeine and 2% chlorhexidine).  Aqueous lidocaine anesthetic.  Direct ultrasound visualization of the left internal jugular vein, with placement of central venous catheter using modified seldinger technique.  Good dark venous blood from all 3 lumens.  Chest x-ray ordered by me and confirmed tip of IJ central line is at the mid to distal lobe superior vena cava level.  No immediate complication.    Recommendation: remove central line at earliest time feasible to mitigate infectious risks

## 2025-02-14 NOTE — PROGRESS NOTES
TPN to start today per Dr Calix  Labs reviewed, Phos a little high , will start Clinimix E at 40ml/hr and increase to goal tomorrow. May need to modify electrolytes if Phos continues to increase.  Recent Labs     02/12/25  0533 02/13/25  0450 02/13/25  1137 02/14/25  0433 02/14/25  1320    139  --  137  --    K 3.6 4.2  4.2  --  4.7  --     102  --  100  --    CO2 32 27  --  25  --    BUN 16 16  --  28*  --    GLUCOSE 104* 107*  --  90  --    CALCIUM 7.2* 7.1*  --  7.2*  --    MG  --  1.75*  --  1.86  --    PHOS  --  3.6  --  5.1*  --    TRIG  --  74  --   --   --    WBC 8.1 13.4*  --  13.6*  --    PHART  --   --    < >  --  7.161*    < > = values in this interval not displayed.       Sabiha Cid, [facility] 2/14/2025 2:14 PM

## 2025-02-14 NOTE — PROGRESS NOTES
Care rounds completed with Dr. Romero and multidisciplinary team. Reviewed labs, meds, VS, assessment, & plan of care for today. See dictated note and new orders for details.     Restart precedex  Hold librium, cymbalta, seroquel  Plan for intubate and CVC plan   ml/hr  Hold metropolol for SBP < 100

## 2025-02-14 NOTE — PROGRESS NOTES
Order for rapid sequence intubation obtained.      Patient pre-oxygenated to with BVM to a saturation 93 %. , /52    Precedex STOPPED 11:36 AM      90 mg of Ketamine ordered and administered at 11:37 AM   89 mg of Rocuronium ordered and administered at 11:41 AM   Propofol started at 1152 AM      Recent Labs     06/27/22  1930 06/29/22  0426    132*   K 4.4 3.6   BUN 21* 22*   CREATININE <0.5* <0.5*          Ramesh, RT with Dr. Romero at bedside  inserted a number  7.5 ET tube. The ET tube is secured at 24 cm measured at the patients lip line. Breath sounds are equal bilaterally. There is a positive color change noted in the colorimetric CO2 detector. RT connected the patient to a ventilator. See orders for ventilator orders.       POST Intubation ORDERS    ABG ordered in 1 hours  Portable Chest x-ray ordered to confirm placement of the patients ET tube and gastric tube.   Bilateral wrist restraints ordered and initiated. Pulses present distal to restraints.   Propofol drip ordered for sedation. See EMAR and orders.     See physician note to follow. Electronically signed by Carol Ann Murillo RN on 6/29/2022 at 1:08 PM

## 2025-02-14 NOTE — PROGRESS NOTES
Dr. Romero spoke with pt sister Raina and Jessica.     Called and spoke with brother, Reagan. And pt Abran spears. Both agree about plan for intubation and CVC. Attempted to call pt daughter, vickie but unable to reach.

## 2025-02-14 NOTE — PLAN OF CARE
Problem: Respiratory - Adult  Goal: Achieves optimal ventilation and oxygenation  Outcome: Progressing  Flowsheets (Taken 2/13/2025 2000)  Achieves optimal ventilation and oxygenation:   Assess for changes in respiratory status   Assess for changes in mentation and behavior   Position to facilitate oxygenation and minimize respiratory effort   Oxygen supplementation based on oxygen saturation or arterial blood gases     Problem: Cardiovascular - Adult  Goal: Maintains optimal cardiac output and hemodynamic stability  Outcome: Progressing  Flowsheets (Taken 2/13/2025 2000)  Maintains optimal cardiac output and hemodynamic stability: Monitor blood pressure and heart rate  Goal: Absence of cardiac dysrhythmias or at baseline  Outcome: Progressing  Flowsheets (Taken 2/13/2025 2000)  Absence of cardiac dysrhythmias or at baseline: Monitor cardiac rate and rhythm

## 2025-02-14 NOTE — PROGRESS NOTES
Pt tachycardic in the 130s this evening.  Message sent to [employer] regarding concerns about HR.  Pt normotensive, no s/s of pain or fever.  EKG ordered by MD.  EKG obtained and in Epic at this time.  MD notified.

## 2025-02-14 NOTE — PROCEDURES
ENDOTRACHEAL INTUBATION    INDICATION: Airway protection    CONSENT: Obtained from family    TIME OUT: Taken    SEDATION:  ketamine, rocuronium    PROCEDURE: Using video-assisted laryngoscopy, the vocal cords were well visualized, and a 7.5 mm cuffed endotracheal tube was placed directly through the cords.  Number of attempts #1. good breath sounds were auscultated bilaterally, with no sounds detected over the abdomen. There was a good return of ETCO2 on the monitor. The patient tolerated the procedure well, with no immediate complications.  Procedure was done by RT, as part of training with me supervising step-by-step at the head of the bed.  A chest X-ray is confirmed satisfactory tube placement

## 2025-02-14 NOTE — CARE COORDINATION
Patient intubated today. Has central line.  Starting TPN at 18:00 today. Patient will need new precert for ACM if plan remains the same.

## 2025-02-14 NOTE — PROGRESS NOTES
RT Inhaler-Nebulizer Bronchodilator Protocol Note    There is a bronchodilator order in the chart from a provider indicating to follow the RT Bronchodilator Protocol and there is an “Initiate RT Inhaler-Nebulizer Bronchodilator Protocol” order as well (see protocol at bottom of note).    CXR Findings:  XR CHEST PORTABLE    Result Date: 2/13/2025  Interstitial thickening is likely due to mild pulmonary edema.       The findings from the last RT Protocol Assessment were as follows:   History Pulmonary Disease: (P) Chronic pulmonary disease  Respiratory Pattern: (P) Mild dyspnea at rest, irregular pattern, or RR 21-25 bpm  Breath Sounds: (P) Inspiratory and expiratory or bilateral wheezing and/or rhonchi  Cough: (P) Strong, productive  Indication for Bronchodilator Therapy: (P) Wheezing associated with pulm disorder  Bronchodilator Assessment Score: (P) 13    Aerosolized bronchodilator medication orders have been revised according to the RT Inhaler-Nebulizer Bronchodilator Protocol below.    Respiratory Therapist to perform RT Therapy Protocol Assessment initially then follow the protocol.  Repeat RT Therapy Protocol Assessment PRN for score 0-3 or on second treatment, BID, and PRN for scores above 3.    No Indications - adjust the frequency to every 6 hours PRN wheezing or bronchospasm, if no treatments needed after 48 hours then discontinue using Per Protocol order mode.     If indication present, adjust the RT bronchodilator orders based on the Bronchodilator Assessment Score as indicated below.  Use Inhaler orders unless patient has one or more of the following: on home nebulizer, not able to hold breath for 10 seconds, is not alert and oriented, cannot activate and use MDI correctly, or respiratory rate 25 breaths per minute or more, then use the equivalent nebulizer order(s) with same Frequency and PRN reasons based on the score.  If a patient is on this medication at home then do not decrease Frequency below

## 2025-02-14 NOTE — PROGRESS NOTES
IM Progress Note    Admit Date:  1/30/2025  15    Interval history:  Influenza A and Acute respiratory failure   Was on bipap in iCU and improved, transferred to [facility]      Has NG placed for Ileus  , he pulled out NG and likely has aspirated became hypoxic and transferred to ICU     Seizure like event 2/7,   Remains confused  MRI brain incomplete     Subjective:  Mr. Early continues to be on Precedex.  He is disoriented.  Did not have a BM yesterday.    2/13   Mentation worse today  On precedex   On 4 L of O2    2/14  Very disoriented   Tachycardic again now that he is off precedex   On 4 L of O2  Has not had a BM        Objective:   BP (!) 140/57   Pulse (!) 140   Temp 97.1 °F (36.2 °C) (Temporal)   Resp 22   Ht 1.702 m (5' 7.01\")   Wt 88.6 kg (195 lb 5.2 oz)   SpO2 95%   BMI 30.59 kg/m²     Intake/Output Summary (Last 24 hours) at 2/14/2025 0745  Last data filed at 2/14/2025 0520  Gross per 24 hour   Intake 500 ml   Output 475 ml   Net 25 ml       Physical Exam:        General:  elderly obese male,drowsy  NG in place  Mucous Membranes:  Pink , anicteric  Neck: No JVD, no carotid bruit, no thyromegaly  Chest:  Clear to auscultation bilaterally diminished in bases with occasional wheeze  Cardiovascular:  RRR S1S2 heard, no murmurs or gallops  Abdomen:  full,  obese +distended, mild diffuse tenderness   tender, no organomegaly, BS absent  Extremities: No edema or cyanosis. Distal pulses well felt  Neurological :disoriented   Moving all ext    Medications:   Scheduled Medications:    sodium chloride flush  5-40 mL IntraVENous 2 times per day    chlordiazePOXIDE  100 mg Oral TID    piperacillin-tazobactam  3,375 mg IntraVENous Q8H    zonisamide  100 mg Oral Daily    insulin glargine  7 Units SubCUTAneous Nightly    QUEtiapine  50 mg Oral BID    fat emulsion  250 mL IntraVENous Once per day on Monday Thursday    gabapentin  200 mg Oral 4x Daily    thiamine (B-1) 200 mg in sodium chloride 0.9 % 100 mL IVPB  200  \"CKTOTAL\", \"CKMB\", \"CKMBINDEX\", \"TROPONINI\" in the last 72 hours.    Coagulation:   Lab Results   Component Value Date/Time    INR 1.09 02/12/2025 05:33 AM    APTT 31.7 06/29/2019 03:50 PM     Cardiac markers:   Lab Results   Component Value Date/Time    CKTOTAL 137 03/02/2023 08:03 AM    TROPONINI <0.01 02/28/2023 03:09 PM         Lab Results   Component Value Date    ALT 11 02/14/2025    AST 36 02/14/2025    ALKPHOS 136 (H) 02/14/2025    BILITOT 0.9 02/14/2025       Lab Results   Component Value Date    INR 1.09 02/12/2025    INR 0.93 02/07/2024    INR 0.96 09/04/2023    PROTIME 14.3 02/12/2025    PROTIME 12.5 02/07/2024    PROTIME 12.8 09/04/2023       Radiology      CT ABDOMEN PELVIS W IV CONTRAST Additional Contrast? Oral   Final Result   1. Progressive inflammatory change of the distal and terminal ileum with   suspected inflammatory stricture and developing partial small bowel   obstruction.  Given rapid development and history, an infectious pattern of   enteritis is suspected.   2. No evidence of pneumatosis or perforation on the current exam.   3. Slight increase in size of a small fluid collection adjacent to the   terminal ileum. A small developing abscess is likely.   4. Severe bladder mucosal thickening. Correlate with urinalysis.   5. Worsening dependent airspace opacities in the lower lobes. Atelectasis or   edema may be considered. Developing pneumonitis cannot be excluded.         XR CHEST PORTABLE   Final Result   Interstitial thickening is likely due to mild pulmonary edema.         XR ABDOMEN (2 VIEWS)   Final Result   Persistent but improving ileus pattern.         XR CHEST PORTABLE   Final Result   1. Persistent mild blunting of left costophrenic angle which could be related   to pleural thickening or trace pleural effusion.  There are no acute findings   elsewhere in the chest.   2. Gastric tube in place which extends a short distance in the stomach with   the side port 2-3 cm above the

## 2025-02-14 NOTE — PROGRESS NOTES
Pulmonary Medicine Progress Note :                                                               CC: COPD and flu and SOB     Subjective:  Precedex off   Agitated lethargic   Upper airway secretions and sounding very congested  4L- uses 4 at home   D5W 50 cc/hr         Intake/Output Summary (Last 24 hours) at 2/14/2025 0741  Last data filed at 2/14/2025 0520  Gross per 24 hour   Intake 500 ml   Output 475 ml   Net 25 ml         PHYSICAL EXAM:  BP (!) 140/57   Pulse (!) 140   Temp 97.1 °F (36.2 °C) (Temporal)   Resp 22   Ht 1.702 m (5' 7.01\")   Wt 88.6 kg (195 lb 5.2 oz)   SpO2 95%   BMI 30.59 kg/m²  4 L  Constitutional:  + acute distress.   Eyes: PERRL. Conjunctivae anicteric.   ENT: Normal nose. Normal tongue.    Neck:  Trachea is midline.   Respiratory: + accessory muscle usage.   Decreased breath sounds. Few wheezes. No rales. +  Rhonchi.  Cardiovascular: Normal S1S2. No digit clubbing. No digit cyanosis. No LE edema.   Psychiatric: Lethargic combative at times    LABS:  CBC:   Recent Labs     02/12/25 0533 02/13/25 0450 02/14/25 0433   WBC 8.1 13.4* 13.6*   HGB 8.2* 9.0* 8.9*   HCT 25.0* 28.7* 27.3*   MCV 94.3 95.8 93.4    203 284     BMP:   Recent Labs     02/12/25 0533 02/13/25 0450 02/14/25  0433    139 137   K 3.6 4.2  4.2 4.7    102 100   CO2 32 27 25   PHOS  --  3.6 5.1*   BUN 16 16 28*   CREATININE 0.8 0.9 1.5*     LIVER PROFILE:   Recent Labs     02/13/25 0450 02/14/25  0433   AST 22 36   ALT 9* 11   BILIDIR 0.3 0.2   BILITOT 0.6 0.9   ALKPHOS 95 136*       Microbiology:  1/20 Flu +   2/5 Strep and leg negative       Imaging:  CXR 2/8 images independently reviewed by me and showed:  No acute cardiopulmonary disease     CT abdomen/Pelvis 2/13   1. Progressive inflammatory change of the distal and terminal ileum with   suspected inflammatory stricture and developing partial small bowel   obstruction.  Given rapid development and history, an  team members, and this time excludes time spent on procedures.

## 2025-02-14 NOTE — PROGRESS NOTES
[facility] SURGERY    PATIENT NAME: Sandor Early     TODAY'S DATE: 2/14/2025    CHIEF COMPLAINT: N/A    INTERVAL HISTORY/HPI:    Pt patient was intubated this morning due to increased respiratory secretions.  No significant nasogastric tube output     REVIEW OF SYSTEMS:  Pertinent positives and negatives as per interval history section    OBJECTIVE:  VITALS:  BP (!) 125/45   Pulse (!) 137   Temp 99.7 °F (37.6 °C) (Temporal)   Resp 16   Ht 1.702 m (5' 7.01\")   Wt 88.6 kg (195 lb 5.2 oz)   SpO2 93%   BMI 30.59 kg/m²     INTAKE/OUTPUT:    I/O last 3 completed shifts:  In: 500 [NG/GT:500]  Out: 475 [Urine:475]  I/O this shift:  In: 635.7 [I.V.:248.7; IV Piggyback:387]  Out: 565 [Urine:465; Emesis/NG output:100]    CONSTITUTIONAL: Intubated and sedated  LUNGS: Diminished breath sounds at bases  CARD:  tachycardic with regular rhythm  ABDOMEN:  hypoactive bowel sounds, soft, mildly distended, no pain response to palpation    Data:  CBC:   Recent Labs     02/12/25 0533 02/13/25 0450 02/14/25 0433   WBC 8.1 13.4* 13.6*   HGB 8.2* 9.0* 8.9*   HCT 25.0* 28.7* 27.3*    203 284     BMP:    Recent Labs     02/12/25 0533 02/13/25 0450 02/14/25 0433    139 137   K 3.6 4.2  4.2 4.7    102 100   CO2 32 27 25   BUN 16 16 28*   CREATININE 0.8 0.9 1.5*   GLUCOSE 104* 107* 90     Hepatic:   Recent Labs     02/13/25 0450 02/14/25  0433   AST 22 36   ALT 9* 11   BILITOT 0.6 0.9   ALKPHOS 95 136*     Mag:      Recent Labs     02/13/25 0450 02/14/25  0433   MG 1.75* 1.86      Phos:     Recent Labs     02/13/25 0450 02/14/25 0433   PHOS 3.6 5.1*      INR:   Recent Labs     02/12/25 0533   INR 1.09       Radiology Review:  *Imaging personally reviewed by me.   Abdominal x-ray images reviewed and show some persistently dilated loops of small bowel, essentially unchanged  CT from yesterday reviewed with radiologist and shows distal small bowel thickening consistent with likely infectious

## 2025-02-14 NOTE — PROGRESS NOTES
Dr. Romero at bedside and placed LIJ CVC.       MARY Evans RN with this RN and chavira catheter  placed under sterile technique d/t pt having urinary retention overnight requiring straight cath and no urine output this shift.

## 2025-02-14 NOTE — PROGRESS NOTES
Latest Reference Range & Units 02/14/25 13:20   pH, Arterial 7.350 - 7.450  7.161 (LL)   pCO2, Arterial 35.0 - 45.0 mmHg 75.1 (HH)   (LL): Data is critically low  (HH): Data is critically high    Dr. Romero made aware, increase RR 24 and repeat ABG in 1 hour

## 2025-02-14 NOTE — PROGRESS NOTES
Patient intubated with 7.5 ETT @ 24cm lip, good color change, bilateral breath sounds auscultated.

## 2025-02-14 NOTE — PROGRESS NOTES
02/13/25 6895   Oxygen Therapy/Pulse Ox   Pulse (!) 121   Respirations 30   Cough/Sputum   Sputum How Obtained Nasal tracheal;Suctioned   $Obtained Sample $Nasotracheal Suction   Cough Productive   Sputum Amount Large   Sputum Color Creamy;Red   Tenacity Thick

## 2025-02-15 ENCOUNTER — APPOINTMENT (OUTPATIENT)
Dept: GENERAL RADIOLOGY | Age: 70
DRG: 207 | End: 2025-02-15
Payer: MEDICARE

## 2025-02-15 PROBLEM — J40 TRACHEOBRONCHITIS: Status: ACTIVE | Noted: 2025-02-15

## 2025-02-15 PROBLEM — K52.9 ENTERITIS: Status: ACTIVE | Noted: 2025-02-15

## 2025-02-15 LAB
ALBUMIN SERPL-MCNC: 1.8 G/DL (ref 3.4–5)
ALP SERPL-CCNC: 117 U/L (ref 40–129)
ALT SERPL-CCNC: 8 U/L (ref 10–40)
ANION GAP SERPL CALCULATED.3IONS-SCNC: 8 MMOL/L (ref 3–16)
AST SERPL-CCNC: 23 U/L (ref 15–37)
BASE EXCESS BLDA CALC-SCNC: 1.7 MMOL/L (ref -3–3)
BASOPHILS # BLD: 0 K/UL (ref 0–0.2)
BASOPHILS NFR BLD: 0.3 %
BILIRUB DIRECT SERPL-MCNC: 0.4 MG/DL (ref 0–0.3)
BILIRUB INDIRECT SERPL-MCNC: 0.1 MG/DL (ref 0–1)
BILIRUB SERPL-MCNC: 0.5 MG/DL (ref 0–1)
BUN SERPL-MCNC: 23 MG/DL (ref 7–20)
CALCIUM SERPL-MCNC: 6.7 MG/DL (ref 8.3–10.6)
CHLORIDE SERPL-SCNC: 104 MMOL/L (ref 99–110)
CO2 BLDA-SCNC: 29.1 MMOL/L
CO2 SERPL-SCNC: 27 MMOL/L (ref 21–32)
COHGB MFR BLDA: 0.2 % (ref 0–1.5)
CREAT SERPL-MCNC: 1.2 MG/DL (ref 0.8–1.3)
DEPRECATED RDW RBC AUTO: 16.2 % (ref 12.4–15.4)
EOSINOPHIL # BLD: 0 K/UL (ref 0–0.6)
EOSINOPHIL NFR BLD: 0.5 %
GFR SERPLBLD CREATININE-BSD FMLA CKD-EPI: 66 ML/MIN/{1.73_M2}
GLUCOSE BLD-MCNC: 144 MG/DL (ref 70–99)
GLUCOSE BLD-MCNC: 164 MG/DL (ref 70–99)
GLUCOSE BLD-MCNC: 175 MG/DL (ref 70–99)
GLUCOSE BLD-MCNC: 177 MG/DL (ref 70–99)
GLUCOSE BLD-MCNC: 180 MG/DL (ref 70–99)
GLUCOSE BLD-MCNC: 203 MG/DL (ref 70–99)
GLUCOSE BLD-MCNC: 218 MG/DL (ref 70–99)
GLUCOSE SERPL-MCNC: 146 MG/DL (ref 70–99)
HCO3 BLDA-SCNC: 27.5 MMOL/L (ref 21–29)
HCT VFR BLD AUTO: 22 % (ref 40.5–52.5)
HGB BLD-MCNC: 7.3 G/DL (ref 13.5–17.5)
HGB BLDA-MCNC: 8 G/DL (ref 13.5–17.5)
LYMPHOCYTES # BLD: 0.6 K/UL (ref 1–5.1)
LYMPHOCYTES NFR BLD: 8.7 %
MAGNESIUM SERPL-MCNC: 1.8 MG/DL (ref 1.8–2.4)
MCH RBC QN AUTO: 30.3 PG (ref 26–34)
MCHC RBC AUTO-ENTMCNC: 33 G/DL (ref 31–36)
MCV RBC AUTO: 91.9 FL (ref 80–100)
METHGB MFR BLDA: 0.2 %
MONOCYTES # BLD: 0.6 K/UL (ref 0–1.3)
MONOCYTES NFR BLD: 7.7 %
NEUTROPHILS # BLD: 5.9 K/UL (ref 1.7–7.7)
NEUTROPHILS NFR BLD: 82.8 %
O2 THERAPY: ABNORMAL
PCO2 BLDA: 50.2 MMHG (ref 35–45)
PERFORMED ON: ABNORMAL
PH BLDA: 7.36 [PH] (ref 7.35–7.45)
PHOSPHATE SERPL-MCNC: 2.7 MG/DL (ref 2.5–4.9)
PLATELET # BLD AUTO: 254 K/UL (ref 135–450)
PMV BLD AUTO: 7.6 FL (ref 5–10.5)
PO2 BLDA: 94.7 MMHG (ref 75–108)
POTASSIUM SERPL-SCNC: 3.3 MMOL/L (ref 3.5–5.1)
PROT SERPL-MCNC: 5.1 G/DL (ref 6.4–8.2)
RBC # BLD AUTO: 2.39 M/UL (ref 4.2–5.9)
SAO2 % BLDA: 96.9 %
SODIUM SERPL-SCNC: 139 MMOL/L (ref 136–145)
TRIGL SERPL-MCNC: 134 MG/DL (ref 0–150)
WBC # BLD AUTO: 7.1 K/UL (ref 4–11)

## 2025-02-15 PROCEDURE — 71045 X-RAY EXAM CHEST 1 VIEW: CPT

## 2025-02-15 PROCEDURE — 2580000003 HC RX 258: Performed by: INTERNAL MEDICINE

## 2025-02-15 PROCEDURE — 83735 ASSAY OF MAGNESIUM: CPT

## 2025-02-15 PROCEDURE — 99232 SBSQ HOSP IP/OBS MODERATE 35: CPT | Performed by: SURGERY

## 2025-02-15 PROCEDURE — 6360000002 HC RX W HCPCS: Performed by: INTERNAL MEDICINE

## 2025-02-15 PROCEDURE — 2500000003 HC RX 250 WO HCPCS: Performed by: SURGERY

## 2025-02-15 PROCEDURE — 2500000003 HC RX 250 WO HCPCS: Performed by: INTERNAL MEDICINE

## 2025-02-15 PROCEDURE — 99233 SBSQ HOSP IP/OBS HIGH 50: CPT | Performed by: INTERNAL MEDICINE

## 2025-02-15 PROCEDURE — 6370000000 HC RX 637 (ALT 250 FOR IP): Performed by: INTERNAL MEDICINE

## 2025-02-15 PROCEDURE — 84100 ASSAY OF PHOSPHORUS: CPT

## 2025-02-15 PROCEDURE — 2500000003 HC RX 250 WO HCPCS: Performed by: STUDENT IN AN ORGANIZED HEALTH CARE EDUCATION/TRAINING PROGRAM

## 2025-02-15 PROCEDURE — 6370000000 HC RX 637 (ALT 250 FOR IP): Performed by: PSYCHIATRY & NEUROLOGY

## 2025-02-15 PROCEDURE — 2700000000 HC OXYGEN THERAPY PER DAY

## 2025-02-15 PROCEDURE — 2000000000 HC ICU R&B

## 2025-02-15 PROCEDURE — 80076 HEPATIC FUNCTION PANEL: CPT

## 2025-02-15 PROCEDURE — 84478 ASSAY OF TRIGLYCERIDES: CPT

## 2025-02-15 PROCEDURE — 87641 MR-STAPH DNA AMP PROBE: CPT

## 2025-02-15 PROCEDURE — 94640 AIRWAY INHALATION TREATMENT: CPT

## 2025-02-15 PROCEDURE — 82803 BLOOD GASES ANY COMBINATION: CPT

## 2025-02-15 PROCEDURE — 94003 VENT MGMT INPAT SUBQ DAY: CPT

## 2025-02-15 PROCEDURE — 99291 CRITICAL CARE FIRST HOUR: CPT | Performed by: INTERNAL MEDICINE

## 2025-02-15 PROCEDURE — 94761 N-INVAS EAR/PLS OXIMETRY MLT: CPT

## 2025-02-15 PROCEDURE — 80048 BASIC METABOLIC PNL TOTAL CA: CPT

## 2025-02-15 PROCEDURE — 85025 COMPLETE CBC W/AUTO DIFF WBC: CPT

## 2025-02-15 PROCEDURE — 6370000000 HC RX 637 (ALT 250 FOR IP): Performed by: PEDIATRICS

## 2025-02-15 RX ORDER — VANCOMYCIN 2 G/400ML
2000 INJECTION, SOLUTION INTRAVENOUS ONCE
Status: COMPLETED | OUTPATIENT
Start: 2025-02-15 | End: 2025-02-15

## 2025-02-15 RX ORDER — METOPROLOL TARTRATE 25 MG/1
25 TABLET, FILM COATED ORAL 2 TIMES DAILY
Status: DISCONTINUED | OUTPATIENT
Start: 2025-02-15 | End: 2025-02-16

## 2025-02-15 RX ORDER — VANCOMYCIN 1.5 G/300ML
1500 INJECTION, SOLUTION INTRAVENOUS EVERY 24 HOURS
Status: DISCONTINUED | OUTPATIENT
Start: 2025-02-16 | End: 2025-02-16 | Stop reason: DRUGHIGH

## 2025-02-15 RX ORDER — ALBUTEROL SULFATE 0.83 MG/ML
2.5 SOLUTION RESPIRATORY (INHALATION)
Status: DISCONTINUED | OUTPATIENT
Start: 2025-02-15 | End: 2025-03-22

## 2025-02-15 RX ADMIN — FERROUS SULFATE TAB 325 MG (65 MG ELEMENTAL FE) 325 MG: 325 (65 FE) TAB at 08:40

## 2025-02-15 RX ADMIN — PIPERACILLIN AND TAZOBACTAM 3375 MG: 3; .375 INJECTION, POWDER, LYOPHILIZED, FOR SOLUTION INTRAVENOUS at 01:06

## 2025-02-15 RX ADMIN — METOPROLOL TARTRATE 25 MG: 25 TABLET, FILM COATED ORAL at 14:53

## 2025-02-15 RX ADMIN — PROPOFOL 35 MCG/KG/MIN: 10 INJECTION, EMULSION INTRAVENOUS at 20:33

## 2025-02-15 RX ADMIN — SODIUM CHLORIDE: 9 INJECTION, SOLUTION INTRAVENOUS at 11:49

## 2025-02-15 RX ADMIN — Medication 5000 UNITS: at 08:39

## 2025-02-15 RX ADMIN — ALBUTEROL SULFATE 2.5 MG: 2.5 SOLUTION RESPIRATORY (INHALATION) at 19:48

## 2025-02-15 RX ADMIN — VANCOMYCIN 2000 MG: 2 INJECTION, SOLUTION INTRAVENOUS at 15:09

## 2025-02-15 RX ADMIN — ENOXAPARIN SODIUM 40 MG: 100 INJECTION SUBCUTANEOUS at 08:40

## 2025-02-15 RX ADMIN — MIDAZOLAM HYDROCHLORIDE 2 MG: 1 INJECTION, SOLUTION INTRAMUSCULAR; INTRAVENOUS at 02:29

## 2025-02-15 RX ADMIN — POTASSIUM CHLORIDE 20 MEQ: 400 INJECTION, SOLUTION INTRAVENOUS at 05:09

## 2025-02-15 RX ADMIN — PROPOFOL 35 MCG/KG/MIN: 10 INJECTION, EMULSION INTRAVENOUS at 06:27

## 2025-02-15 RX ADMIN — METOPROLOL TARTRATE 5 MG: 5 INJECTION, SOLUTION INTRAVENOUS at 08:39

## 2025-02-15 RX ADMIN — METOPROLOL TARTRATE 5 MG: 5 INJECTION, SOLUTION INTRAVENOUS at 02:42

## 2025-02-15 RX ADMIN — FLUCONAZOLE 200 MG: 2 INJECTION, SOLUTION INTRAVENOUS at 10:03

## 2025-02-15 RX ADMIN — ALBUTEROL SULFATE 2.5 MG: 2.5 SOLUTION RESPIRATORY (INHALATION) at 15:30

## 2025-02-15 RX ADMIN — SODIUM CHLORIDE: 9 INJECTION, SOLUTION INTRAVENOUS at 01:12

## 2025-02-15 RX ADMIN — PANTOPRAZOLE SODIUM 40 MG: 40 INJECTION, POWDER, LYOPHILIZED, FOR SOLUTION INTRAVENOUS at 08:40

## 2025-02-15 RX ADMIN — ASCORBIC ACID, VITAMIN A PALMITATE, CHOLECALCIFEROL, THIAMINE HYDROCHLORIDE, RIBOFLAVIN-5 PHOSPHATE SODIUM, PYRIDOXINE HYDROCHLORIDE, NIACINAMIDE, DEXPANTHENOL, ALPHA-TOCOPHEROL ACETATE, VITAMIN K1, FOLIC ACID, BIOTIN, CYANOCOBALAMIN: 200; 3300; 200; 6; 3.6; 6; 40; 15; 10; 150; 600; 60; 5 INJECTION, SOLUTION INTRAVENOUS at 17:37

## 2025-02-15 RX ADMIN — SODIUM CHLORIDE, PRESERVATIVE FREE 10 ML: 5 INJECTION INTRAVENOUS at 08:41

## 2025-02-15 RX ADMIN — ATORVASTATIN CALCIUM 40 MG: 40 TABLET, FILM COATED ORAL at 08:40

## 2025-02-15 RX ADMIN — ALLOPURINOL 300 MG: 300 TABLET ORAL at 08:40

## 2025-02-15 RX ADMIN — ALBUTEROL SULFATE 2.5 MG: 2.5 SOLUTION RESPIRATORY (INHALATION) at 11:14

## 2025-02-15 RX ADMIN — PIPERACILLIN AND TAZOBACTAM 3375 MG: 3; .375 INJECTION, POWDER, LYOPHILIZED, FOR SOLUTION INTRAVENOUS at 17:16

## 2025-02-15 RX ADMIN — INSULIN LISPRO 1 UNITS: 100 INJECTION, SOLUTION INTRAVENOUS; SUBCUTANEOUS at 06:18

## 2025-02-15 RX ADMIN — INSULIN LISPRO 1 UNITS: 100 INJECTION, SOLUTION INTRAVENOUS; SUBCUTANEOUS at 20:25

## 2025-02-15 RX ADMIN — METOPROLOL TARTRATE 25 MG: 25 TABLET, FILM COATED ORAL at 20:25

## 2025-02-15 RX ADMIN — PROPOFOL 35 MCG/KG/MIN: 10 INJECTION, EMULSION INTRAVENOUS at 11:08

## 2025-02-15 RX ADMIN — POTASSIUM CHLORIDE 20 MEQ: 400 INJECTION, SOLUTION INTRAVENOUS at 06:18

## 2025-02-15 RX ADMIN — PROPOFOL 30 MCG/KG/MIN: 10 INJECTION, EMULSION INTRAVENOUS at 02:41

## 2025-02-15 RX ADMIN — PIPERACILLIN AND TAZOBACTAM 3375 MG: 3; .375 INJECTION, POWDER, LYOPHILIZED, FOR SOLUTION INTRAVENOUS at 08:58

## 2025-02-15 RX ADMIN — PROPOFOL 35 MCG/KG/MIN: 10 INJECTION, EMULSION INTRAVENOUS at 16:45

## 2025-02-15 RX ADMIN — THIAMINE HYDROCHLORIDE 200 MG: 100 INJECTION, SOLUTION INTRAMUSCULAR; INTRAVENOUS at 11:04

## 2025-02-15 RX ADMIN — ALBUTEROL SULFATE 2.5 MG: 2.5 SOLUTION RESPIRATORY (INHALATION) at 07:54

## 2025-02-15 RX ADMIN — INSULIN GLARGINE 7 UNITS: 100 INJECTION, SOLUTION SUBCUTANEOUS at 20:25

## 2025-02-15 RX ADMIN — ZONISAMIDE 100 MG: 100 CAPSULE ORAL at 08:45

## 2025-02-15 RX ADMIN — SODIUM CHLORIDE, PRESERVATIVE FREE 10 ML: 5 INJECTION INTRAVENOUS at 20:12

## 2025-02-15 ASSESSMENT — PULMONARY FUNCTION TESTS
PIF_VALUE: 36
PIF_VALUE: 38
PIF_VALUE: 31
PIF_VALUE: 35
PIF_VALUE: 38
PIF_VALUE: 38
PIF_VALUE: 35
PIF_VALUE: 35
PIF_VALUE: 36
PIF_VALUE: 35
PIF_VALUE: 39
PIF_VALUE: 37
PIF_VALUE: 34
PIF_VALUE: 37
PIF_VALUE: 34
PIF_VALUE: 35
PIF_VALUE: 35
PIF_VALUE: 36
PIF_VALUE: 33
PIF_VALUE: 38
PIF_VALUE: 39
PIF_VALUE: 33
PIF_VALUE: 35
PIF_VALUE: 38
PIF_VALUE: 35
PIF_VALUE: 38

## 2025-02-15 NOTE — PROGRESS NOTES
Reassessment completed, see flowsheets,. VSS.    Pt now intubated with LIJ CVC.   Propofol 20 mcg/kg/min. 1L NS finishing infusing then will restart  ml/hr.   Bilateral soft wrist restraints in place for pt safety. Telesitter discontinued.     All ICU Lines and monitoring remain in place. Bed locked in lowest position. Call light within reach. Family at bedside.

## 2025-02-15 NOTE — PROGRESS NOTES
Reassessment completed, see flowsheets.  VSS.   ml/hr, propofol 20 mcg/kg/min.      All ICU Lines and monitoring remain in place. Bed locked in lowest position. Call light within reach.

## 2025-02-15 NOTE — PROGRESS NOTES
Progress Note    Admit Date:  1/30/2025    Interval history:  Influenza A and Acute respiratory failure   Was on bipap in iCU and improved, transferred to [facility]        Has NG placed for Ileus  , he pulled out NG and likely has aspirated became hypoxic and transferred to ICU      Seizure like event 2/7,   Remains confused  MRI brain incomplete .     2/13   Mentation worse today  On precedex   On 4 L of O2     2/14  Very disoriented   Tachycardic again now that he is off precedex   On 4 L of O2  Has not had a BM  --> s/p intubation with mechanical ventilation     On TPN     Subjective:  Mr. Early seen sedated on vent no distress  Son at bedside  aware of events from yesterday       Objective:   Patient Vitals for the past 4 hrs:   BP Temp Temp src Pulse Resp SpO2   02/15/25 1200 (!) 130/54 98.5 °F (36.9 °C) Bladder (!) 109 21 98 %   02/15/25 1130 (!) 130/51 -- -- (!) 108 24 98 %   02/15/25 1123 -- -- -- (!) 105 24 100 %   02/15/25 1114 -- -- -- (!) 105 24 98 %   02/15/25 1100 (!) 130/54 98.2 °F (36.8 °C) -- (!) 104 24 100 %   02/15/25 1032 -- -- -- (!) 103 24 99 %   02/15/25 1000 (!) 116/48 -- -- (!) 102 24 99 %   02/15/25 0935 (!) 115/53 -- -- 100 24 99 %   02/15/25 0930 (!) 115/53 -- -- 99 24 99 %   02/15/25 0900 (!) 112/54 -- -- 94 24 99 %          Intake/Output Summary (Last 24 hours) at 2/15/2025 1246  Last data filed at 2/15/2025 1230  Gross per 24 hour   Intake 4598.65 ml   Output 2195 ml   Net 2403.65 ml       Physical Exam:        General:  elderly male sedated on vent  Oral ETT and OG noted  Left IJ TLC . Appears to be not in any distress  Mucous Membranes:  Pink , anicteric  Neck: No JVD, no carotid bruit, no thyromegaly  Chest:  Clear to auscultation bilaterally, minimal wheeze allyson  Cardiovascular:  RRR S1S2 heard, no murmurs or gallops  Abdomen:  Soft, obese undistended, non tender, no organomegaly, BS present  Extremities: No edema or cyanosis. Distal pulses well felt  Neurological :

## 2025-02-15 NOTE — CONSULTS
[facility]   Pharmacy Pharmacokinetic Monitoring Service - Vancomycin     Sandor Early is a 69 y.o. male starting on vancomycin therapy for pneumonia until discontinued.. Pharmacy consulted by Dr. Romero for monitoring and adjustment.    Target Concentration: Goal AUC/LUCILLE 400-600 mg*hr/L    Additional Antimicrobials: Zosyn    Pertinent Laboratory Values:   Wt Readings from Last 1 Encounters:   02/13/25 88.6 kg (195 lb 5.2 oz)     Temp Readings from Last 1 Encounters:   02/15/25 98.5 °F (36.9 °C) (Bladder)     Estimated Creatinine Clearance: 63 mL/min (based on SCr of 1.2 mg/dL).  Recent Labs     02/14/25  0433 02/15/25  0342   CREATININE 1.5* 1.2   BUN 28* 23*   WBC 13.6* 7.1     Procalcitonin:     Pertinent Cultures:  Culture Date Source Results        MRSA Nasal Swab: not ordered. Order placed by pharmacy.    Plan:  Dosing recommendations based on Bayesian software  Start vancomycin 2000 mg x 1, then 1500 mg every 24 hours.  Anticipated AUC of 467 and trough concentration of 11.7 at steady state  Renal labs as indicated   Vancomycin concentration ordered for 2/17 @ 1400.   Pharmacy will continue to monitor patient and adjust therapy as indicated    Thank you for the consult,  Samuel Cid RPH  2/15/2025 2:10 PM

## 2025-02-15 NOTE — PROGRESS NOTES
Progress Note    Patient Sandor Early  MRN: [id number]  YOB: 1955 Age: 69 y.o. Sex: male  Room: [address] [place] [address]       Admitting Physician: Luda Richey MD   Date of Admission: 1/30/2025  5:28 PM   Primary Care Physician: Juliet Mayorga MD     Subjective:  Sandor Early was seen and examined. We are following for abdominal distention, small bowel obstruction, worsening inflammatory changes involving the ileum. .  -- Patient in the ICU.  Stable polyp.  He is not requiring any pressors.  NG output has decreased.      Objective:  Vital Signs:   Vitals:    02/15/25 0935   BP: (!) 115/53   Pulse: 100   Resp: 24   Temp:    SpO2: 99%         Physical Exam: Intubated, on the ventilator.  Less distended.  No peritoneal signs.      Intake/Output:    Intake/Output Summary (Last 24 hours) at 2/15/2025 0959  Last data filed at 2/15/2025 0900  Gross per 24 hour   Intake 3341.58 ml   Output 1945 ml   Net 1396.58 ml        Current Medications:  Current Facility-Administered Medications   Medication Dose Route Frequency Provider Last Rate Last Admin    PN-Adult Premix 5/20 - Standard Electrolytes - Central Line   IntraVENous Continuous TPN Emory Penaloza MD        albuterol (PROVENTIL) (2.5 MG/3ML) 0.083% nebulizer solution 2.5 mg  2.5 mg Nebulization 4x Daily RT Philip Romero MD   2.5 mg at 02/15/25 0754    metoprolol (LOPRESSOR) injection 5 mg  5 mg IntraVENous Q6H Philip Romero MD   5 mg at 02/15/25 0839    0.9 % sodium chloride infusion   IntraVENous Continuous Philip Romero  mL/hr at 02/15/25 0600 Rate Verify at 02/15/25 0600    fluconazole (DIFLUCAN) in 0.9 % sodium chloride IVPB 200 mg  200 mg IntraVENous Q24H Philip Romero MD   Stopped at 02/14/25 1134    midazolam (VERSED) injection 5 mg  5 mg IntraVENous Once Philip Romero MD        fentaNYL (SUBLIMAZE) injection 100 mcg  100 mcg IntraVENous Once Philip Romero MD        propofol infusion  5-50 mcg/kg/min IntraVENous Continuous Heather,

## 2025-02-15 NOTE — PLAN OF CARE
Problem: Respiratory - Adult  Goal: Achieves optimal ventilation and oxygenation  Outcome: Progressing  Flowsheets (Taken 2/14/2025 2000)  Achieves optimal ventilation and oxygenation:   Assess for changes in respiratory status   Assess for changes in mentation and behavior   Position to facilitate oxygenation and minimize respiratory effort   Oxygen supplementation based on oxygen saturation or arterial blood gases     Problem: Cardiovascular - Adult  Goal: Maintains optimal cardiac output and hemodynamic stability  Outcome: Progressing  Flowsheets (Taken 2/14/2025 2000)  Maintains optimal cardiac output and hemodynamic stability: Monitor blood pressure and heart rate  Goal: Absence of cardiac dysrhythmias or at baseline  Outcome: Progressing  Flowsheets (Taken 2/14/2025 2000)  Absence of cardiac dysrhythmias or at baseline: Monitor cardiac rate and rhythm     Problem: Infection - Adult  Goal: Absence of infection at discharge  Outcome: Progressing  Flowsheets (Taken 2/14/2025 2000)  Absence of infection at discharge:   Assess and monitor for signs and symptoms of infection   Monitor lab/diagnostic results   Monitor all insertion sites i.e., indwelling lines, tubes and drains   Monitor endotracheal (as able) and nasal secretions for changes in amount and color

## 2025-02-15 NOTE — PROGRESS NOTES
02/14/25 2344   Patient Observation   Pulse (!) 102   Respirations 17   SpO2 100 %   Patient Observations 7.5 ett 25 at lip   Breath Sounds   Right Upper Lobe Diminished   Right Middle Lobe Diminished   Right Lower Lobe Diminished   Left Upper Lobe Diminished   Left Lower Lobe Diminished   Vent Information   Vent Mode AC/VC   Ventilator Settings   FiO2  50 %   Vt (Set, mL) 400 mL   Resp Rate (Set) 24 bpm   PEEP/CPAP (cmH2O) 5   Vent Patient Data (Readings)   Vt (Measured) 413 mL   Peak Inspiratory Pressure (cmH2O) 34 cmH2O   Rate Measured 24 br/min   Minute Volume (L/min) 9.91 Liters   Mean Airway Pressure (cmH2O) 11 cmH20   Plateau Pressure (cm H2O) 15 cm H2O   Driving Pressure 10   I:E Ratio 1:3.60   Flow Sensitivity 3 L/min   Vent Alarm Settings   High Pressure (cmH2O) 40 cmH2O   Low Minute Volume (lpm) 4 L/min   Low Exhaled Vt (ml) 250 mL   RR High (bpm) 30 br/min   Apnea (secs) 20 secs   Additional Respiratoray Assessments   Humidification Source HME   Ambu Bag With Mask At Bedside Yes   Airway Clearance   Suction ET Tube   Suction Device Inline suction catheter   Sputum Method Obtained Endotracheal   Sputum Amount Small   Sputum Color/Odor Clear   Sputum Consistency Thick   ETT    Placement Date/Time: 02/14/25 1135   Present on Admission/Arrival: No  Placed By: RT  Placement Verified By: Auscultation;Colorimetric ETCO2 device;Chest X-ray  Preoxygenation: Yes  Airway Tube Size: 7.5 mm  Secured At: 24 cm  Measured From: Lips   Secured At 25 cm   Measured From Lips   ETT Placement Center   Secured By Commercial tube may

## 2025-02-15 NOTE — PROGRESS NOTES
02/14/25 2034   Patient Observation   Pulse (!) 123   Respirations 15   SpO2 98 %   Patient Observations 7.5 ett 25 at lip   Breath Sounds   Right Upper Lobe Diminished   Right Middle Lobe Diminished   Right Lower Lobe Diminished   Left Upper Lobe Diminished   Left Lower Lobe Diminished   Vent Information   Vent Mode AC/VC   Ventilator Settings   FiO2  50 %   Vt (Set, mL) 400 mL   Resp Rate (Set) 24 bpm   PEEP/CPAP (cmH2O) 5   Vent Patient Data (Readings)   Vt (Measured) 416 mL   Peak Inspiratory Pressure (cmH2O) 33 cmH2O   Rate Measured 27 br/min   Minute Volume (L/min) 10.2 Liters   Mean Airway Pressure (cmH2O) 11 cmH20   Plateau Pressure (cm H2O) 0 cm H2O   Driving Pressure -5   I:E Ratio 1:3.00   Flow Sensitivity 3 L/min   Static Compliance (L/cm H2O) 40   Dynamic Compliance (L/cm H2O) 10 L/cm H2O   Vent Alarm Settings   High Pressure (cmH2O) 40 cmH2O   Low Minute Volume (lpm) 4 L/min   Low Exhaled Vt (ml) 250 mL   RR High (bpm) 30 br/min   Apnea (secs) 20 secs   Additional Respiratoray Assessments   Humidification Source HME   Ambu Bag With Mask At Bedside Yes   Airway Clearance   Suction ET Tube   Suction Device Inline suction catheter   Sputum Method Obtained Endotracheal   Sputum Amount Small   Sputum Color/Odor Bloody   Sputum Consistency Thick   ETT    Placement Date/Time: 02/14/25 1135   Present on Admission/Arrival: No  Placed By: RT  Placement Verified By: Auscultation;Colorimetric ETCO2 device;Chest X-ray  Preoxygenation: Yes  Airway Tube Size: 7.5 mm  Secured At: 24 cm  Measured From: Lips   Secured At 25 cm   Measured From Lips   ETT Placement Left   Secured By Commercial tube may   Treatment   $Treatment Type $Inhaled Therapy/Meds

## 2025-02-15 NOTE — PROGRESS NOTES
[facility] SURGERY    PATIENT NAME: Sandor Early     TODAY'S DATE: 2/15/2025    CHIEF COMPLAINT: N/A    INTERVAL HISTORY/HPI:    Pt stable overnight.     REVIEW OF SYSTEMS:  Pertinent positives and negatives as per interval history section    OBJECTIVE:  VITALS:  BP (!) 105/53   Pulse 96   Temp 98.1 °F (36.7 °C) (Bladder)   Resp 24   Ht 1.702 m (5' 7.01\")   Wt 88.6 kg (195 lb 5.2 oz)   SpO2 100%   BMI 30.59 kg/m²     INTAKE/OUTPUT:    I/O last 3 completed shifts:  In: 3341.6 [I.V.:1980.7; IV Piggyback:599.2]  Out: 2120 [Urine:1920; Emesis/NG output:200]  I/O this shift:  In: -   Out: 250 [Urine:250]    CONSTITUTIONAL: Intubated and sedated  LUNGS: Diminished breath sounds bases  CARD:  regular rate and rhythm  ABDOMEN:  hypoactive bowel sounds, soft, non-distended, no pain response to palpation    Data:  CBC:   Recent Labs     02/13/25  0450 02/14/25  0433 02/15/25  0342   WBC 13.4* 13.6* 7.1   HGB 9.0* 8.9* 7.3*   HCT 28.7* 27.3* 22.0*    284 254     BMP:    Recent Labs     02/13/25  0450 02/14/25  0433 02/15/25  0342    137 139   K 4.2  4.2 4.7 3.3*    100 104   CO2 27 25 27   BUN 16 28* 23*   CREATININE 0.9 1.5* 1.2   GLUCOSE 107* 90 146*     Hepatic:   Recent Labs     02/13/25  0450 02/14/25  0433 02/15/25  0342   AST 22 36 23   ALT 9* 11 8*   BILITOT 0.6 0.9 0.5   ALKPHOS 95 136* 117     Mag:      Recent Labs     02/13/25  0450 02/14/25  0433 02/15/25  0342   MG 1.75* 1.86 1.80      Phos:     Recent Labs     02/13/25  0450 02/14/25  0433 02/15/25  0342   PHOS 3.6 5.1* 2.7      INR: No results for input(s): \"INR\" in the last 72 hours.    Radiology Review:  *Imaging personally reviewed by me.   Chest x-ray shows bronchiolitis      ASSESSMENT AND PLAN:  Enteritis with secondary partial small bowel obstruction and underlying metabolic encephalopathy with pulmonary disease and alcohol withdrawal.  Continue antibiotics.  The seem to be helping, as his leukocytosis has resolved as

## 2025-02-15 NOTE — PROGRESS NOTES
Pt has daughter, Catia, at bedside. Updated on POC and that per the state of [place] she is the next of kin for medical decisions. She stated understanding. Her and pt regan (Abran) will be the point of contact for family. They agree on current POC.

## 2025-02-15 NOTE — PLAN OF CARE
Problem: Chronic Conditions and Co-morbidities  Goal: Patient's chronic conditions and co-morbidity symptoms are monitored and maintained or improved  2/15/2025 1014 by Josi Davis RN  Outcome: Progressing  2/15/2025 0344 by Conrad Gage RN  Outcome: Progressing     Problem: Discharge Planning  Goal: Discharge to home or other facility with appropriate resources  2/15/2025 1014 by Josi Davis RN  Outcome: Progressing  2/15/2025 0344 by Conrad Gage RN  Outcome: Progressing     Problem: Safety - Adult  Goal: Free from fall injury  2/15/2025 1014 by Josi Davis RN  Outcome: Progressing  2/15/2025 0344 by Conrad Gage RN  Outcome: Progressing     Problem: Pain  Goal: Verbalizes/displays adequate comfort level or baseline comfort level  2/15/2025 1014 by Josi Davis RN  Outcome: Progressing  2/15/2025 0344 by Conrad Gage RN  Outcome: Progressing  Flowsheets (Taken 2/14/2025 2000)  Verbalizes/displays adequate comfort level or baseline comfort level:   Assess pain using appropriate pain scale   Implement non-pharmacological measures as appropriate and evaluate response     Problem: Respiratory - Adult  Goal: Achieves optimal ventilation and oxygenation  2/15/2025 1014 by Josi Davis RN  Outcome: Progressing  2/15/2025 0344 by Conrad Gage RN  Outcome: Progressing  Flowsheets (Taken 2/14/2025 2000)  Achieves optimal ventilation and oxygenation:   Assess for changes in respiratory status   Assess for changes in mentation and behavior   Position to facilitate oxygenation and minimize respiratory effort   Oxygen supplementation based on oxygen saturation or arterial blood gases     Problem: Cardiovascular - Adult  Goal: Maintains optimal cardiac output and hemodynamic stability  Recent Flowsheet Documentation  Taken 2/15/2025 0935 by Josi Davis RN  Maintains optimal cardiac output and hemodynamic stability: Monitor blood pressure and heart rate  2/15/2025 0344 by Too  CONSTANTINO Martines  Outcome: Progressing  Flowsheets (Taken 2/14/2025 2000)  Maintains optimal cardiac output and hemodynamic stability: Monitor blood pressure and heart rate  Goal: Absence of cardiac dysrhythmias or at baseline  Recent Flowsheet Documentation  Taken 2/15/2025 0935 by Josi Davis RN  Absence of cardiac dysrhythmias or at baseline: Monitor cardiac rate and rhythm  2/15/2025 0344 by Conrad Gage RN  Outcome: Progressing  Flowsheets (Taken 2/14/2025 2000)  Absence of cardiac dysrhythmias or at baseline: Monitor cardiac rate and rhythm     Problem: Skin/Tissue Integrity - Adult  Goal: Skin integrity remains intact  Description: 1.  Monitor for areas of redness and/or skin breakdown  2.  Assess vascular access sites hourly  3.  Every 4-6 hours minimum:  Change oxygen saturation probe site  4.  Every 4-6 hours:  If on nasal continuous positive airway pressure, respiratory therapy assess nares and determine need for appliance change or resting period  2/15/2025 0344 by Conrad Gage RN  Outcome: Progressing  Flowsheets (Taken 2/14/2025 2000)  Skin Integrity Remains Intact:   Monitor for areas of redness and/or skin breakdown   Assess vascular access sites hourly   Every 4-6 hours minimum: Change oxygen saturation probe site   Every 4-6 hours: If on nasal continuous positive airway pressure, respiratory therapy assesses nares and determine need for appliance change or resting period  Goal: Oral mucous membranes remain intact  2/15/2025 0344 by Conrad Gage RN  Outcome: Progressing  Flowsheets (Taken 2/14/2025 2000)  Oral Mucous Membranes Remain Intact: Assess oral mucosa and hygiene practices     Problem: Musculoskeletal - Adult  Goal: Return mobility to safest level of function  2/15/2025 0344 by Conrad Gage RN  Outcome: Progressing  Goal: Maintain proper alignment of affected body part  2/15/2025 0344 by Conrad Gage RN  Outcome: Progressing  Goal: Return ADL status to a safe level of

## 2025-02-15 NOTE — PROGRESS NOTES
Pulmonary & Critical Care Medicine ICU Progress Note    CC: COPD respiratory failure    Events of Last 24 hours:   TPN started   Propofol 30 mcg/kg/min    cc/hr  PEEP 5  FiO2 40%  Minimal secretions         Vascular lines: IV: Left IJ     MV:   Vent Mode: AC/VC Resp Rate (Set): 24 bpm/Vt (Set, mL): 400 mL/ /FiO2 : 40 %  Recent Labs     25  1518 02/15/25  0530   PHART 7.258* 7.357   NKZ2SRN 58.8* 50.2*   PO2ART 141.4* 94.7       IV:   PN-Adult Premix 5/20 - Standard Electrolytes - Central Line      sodium chloride 100 mL/hr at 02/15/25 0600    propofol 35 mcg/kg/min (02/15/25 06)    PN-Adult Premix 5/20 - Standard Electrolytes - Central Line 40 mL/hr at 02/15/25 0600    sodium chloride      dextrose Stopped (25 1324)    dexmedeTOMIDine HCl in NaCl Stopped (25 1136)    sodium chloride      dextrose         Vitals:  Blood pressure (!) 105/53, pulse 97, temperature 97.6 °F (36.4 °C), resp. rate 24, height 1.702 m (5' 7.01\"), weight 88.6 kg (195 lb 5.2 oz), SpO2 100%.  on   Temp  Av.1 °F (37.3 °C)  Min: 97.6 °F (36.4 °C)  Max: 100.3 °F (37.9 °C)    Intake/Output Summary (Last 24 hours) at 2/15/2025 0748  Last data filed at 2/15/2025 0600  Gross per 24 hour   Intake 3341.58 ml   Output 1795 ml   Net 1546.58 ml     EXAM:  General: ill appearing.  Intubated sedated.  Eyes: No sclera icterus. No conjunctival injection.  ENT: No discharge. ET tube in place  Neck: Trachea midline.   Resp: No accessory muscle use. No crackles. No wheezing. No rhonchi.   CV: Regular rate. Regular rhythm. No mumur or rub. No edema.   GI: Non-tender. Non-distended.   Skin: Warm and dry. No rash on exposed extremities.  M/S: No cyanosis. No clubbing.   Neuro: Intubated sedated.  Not following commands.  Psych: Noncommunicative unable to obtain        Scheduled Meds:   albuterol  2.5 mg Nebulization 4x Daily RT    metoprolol  5 mg IntraVENous Q6H    fluconazole  200 mg IntraVENous Q24H    midazolam  5  Flu +   2/5 Strep and leg negative   2/13 BC NGTD  2/13 respiratory  MRSA Abnormal    2/13 MRSA DNA probe positive        Imaging:  CXR 2/15 images independently reviewed by me and showed:  Satisfactory ETT position  No acute cardiopulmonary disease            CT abdomen/Pelvis 2/13   1. Progressive inflammatory change of the distal and terminal ileum with   suspected inflammatory stricture and developing partial small bowel   obstruction.  Given rapid development and history, an infectious pattern of   enteritis is suspected.   2. No evidence of pneumatosis or perforation on the current exam.   3. Slight increase in size of a small fluid collection adjacent to the   terminal ileum. A small developing abscess is likely.   4. Severe bladder mucosal thickening. Correlate with urinalysis.   5. Worsening dependent airspace opacities in the lower lobes. Atelectasis or   edema may be considered. Developing pneumonitis cannot be excluded.      ASSESSMENT:  Acute encephalopathy/Metabolic encephalopathy - worse today   Tracheobronchitis with possible PNA   Pulmonary congestion-suspecting developing respiratory infection  COPD   Alcohol withdrawal with Delirium tremens  Acute kidney injury   Ileus/Enteritis ? Developing abscess   Chronic hypoxia on 4 L O2  Flu A + 1/30  Tobacco abuse  ROCKWELL cirrhosis  Chronic diastolic dysfunction  DM2  PVD status post aortoiliac bpg  Patient has had several episodes of seizure-like activity   Alcohol use/abuse 30 beer/day         PLAN:  Mechanical ventilation as per orders for life threatening respiratory failure  Follow ABG and chest x-ray while on the ventilator  Supplemental oxygen to maintain SaO2 >92%; wean as tolerated  IV Propofol for sedation, target RASS -2, with daily spontaneous awakening trial   Follow TG   Fentanyl and Versed PRN, gtt as needed  Head of bed 30 degrees or higher at all times  Daily spontaneous breathing trial once PEEP less than 8, FiO2 less than 55%  Closely

## 2025-02-15 NOTE — PROGRESS NOTES
02/14/25 2000   RT Protocol   History Pulmonary Disease 2   Respiratory pattern 4   Breath sounds 4   Cough 1   Indications for Bronchodilator Therapy Decreased or absent breath sounds   Bronchodilator Assessment Score 11     RT Inhaler-Nebulizer Bronchodilator Protocol Note    There is a bronchodilator order in the chart from a provider indicating to follow the RT Bronchodilator Protocol and there is an “Initiate RT Inhaler-Nebulizer Bronchodilator Protocol” order as well (see protocol at bottom of note).    CXR Findings:  XR CHEST PORTABLE    Result Date: 2/14/2025  Support tubes and lines as described above. Atelectasis or infiltrate in the left lung base. Right perihilar atelectasis or infiltrate.     XR CHEST PORTABLE    Result Date: 2/13/2025  Interstitial thickening is likely due to mild pulmonary edema.       The findings from the last RT Protocol Assessment were as follows:   History Pulmonary Disease: Chronic pulmonary disease  Respiratory Pattern: Mild dyspnea at rest, irregular pattern, or RR 21-25 bpm  Breath Sounds: Intermittent or unilateral wheezes  Cough: Strong, productive  Indication for Bronchodilator Therapy: Decreased or absent breath sounds  Bronchodilator Assessment Score: 11    Aerosolized bronchodilator medication orders have been revised according to the RT Inhaler-Nebulizer Bronchodilator Protocol below.    Respiratory Therapist to perform RT Therapy Protocol Assessment initially then follow the protocol.  Repeat RT Therapy Protocol Assessment PRN for score 0-3 or on second treatment, BID, and PRN for scores above 3.    No Indications - adjust the frequency to every 6 hours PRN wheezing or bronchospasm, if no treatments needed after 48 hours then discontinue using Per Protocol order mode.     If indication present, adjust the RT bronchodilator orders based on the Bronchodilator Assessment Score as indicated below.  Use Inhaler orders unless patient has one or more of the following: on

## 2025-02-15 NOTE — PROGRESS NOTES
02/15/25 0256   Patient Observation   Pulse 97   Respirations 24   SpO2 100 %   Patient Observations 7.5 ett 25 at lip   Breath Sounds   Right Upper Lobe Diminished   Right Middle Lobe Diminished   Right Lower Lobe Diminished   Left Upper Lobe Diminished   Left Lower Lobe Diminished   Vent Information   Vent Mode AC/VC   Ventilator Settings   FiO2  40 %   Vt (Set, mL) 400 mL   Resp Rate (Set) 24 bpm   PEEP/CPAP (cmH2O) 5   Vent Patient Data (Readings)   Vt (Measured) 415 mL   Peak Inspiratory Pressure (cmH2O) 35 cmH2O   Rate Measured 24 br/min   Minute Volume (L/min) 9.96 Liters   Mean Airway Pressure (cmH2O) 11 cmH20   Plateau Pressure (cm H2O) 15 cm H2O   Driving Pressure 10   I:E Ratio 1:3.60   Flow Sensitivity 3 L/min   Vent Alarm Settings   High Pressure (cmH2O) 40 cmH2O   Low Minute Volume (lpm) 4 L/min   Low Exhaled Vt (ml) 250 mL   RR High (bpm) 30 br/min   Apnea (secs) 20 secs   Additional Respiratoray Assessments   Humidification Source HME   Ambu Bag With Mask At Bedside Yes   Airway Clearance   Suction ET Tube   Suction Device Inline suction catheter   Sputum Method Obtained Endotracheal   Sputum Amount Small   Sputum Color/Odor Clear   Sputum Consistency Thick   ETT    Placement Date/Time: 02/14/25 1135   Present on Admission/Arrival: No  Placed By: RT  Placement Verified By: Auscultation;Colorimetric ETCO2 device;Chest X-ray  Preoxygenation: Yes  Airway Tube Size: 7.5 mm  Secured At: 24 cm  Measured From: Lips   Secured At 25 cm   Measured From Lips   ETT Placement Right   Secured By Commercial tube may

## 2025-02-16 ENCOUNTER — APPOINTMENT (OUTPATIENT)
Dept: GENERAL RADIOLOGY | Age: 70
DRG: 207 | End: 2025-02-16
Payer: MEDICARE

## 2025-02-16 LAB
ALBUMIN SERPL-MCNC: 1.7 G/DL (ref 3.4–5)
ALP SERPL-CCNC: 126 U/L (ref 40–129)
ALT SERPL-CCNC: 7 U/L (ref 10–40)
ANION GAP SERPL CALCULATED.3IONS-SCNC: 6 MMOL/L (ref 3–16)
AST SERPL-CCNC: 21 U/L (ref 15–37)
BACTERIA SPEC RESP CULT: ABNORMAL
BACTERIA SPEC RESP CULT: ABNORMAL
BASE EXCESS BLDA CALC-SCNC: -0.7 MMOL/L (ref -3–3)
BASE EXCESS BLDA CALC-SCNC: -3.2 MMOL/L (ref -3–3)
BASOPHILS # BLD: 0 K/UL (ref 0–0.2)
BASOPHILS NFR BLD: 0.3 %
BILIRUB DIRECT SERPL-MCNC: 0.3 MG/DL (ref 0–0.3)
BILIRUB INDIRECT SERPL-MCNC: 0.1 MG/DL (ref 0–1)
BILIRUB SERPL-MCNC: 0.4 MG/DL (ref 0–1)
BUN SERPL-MCNC: 15 MG/DL (ref 7–20)
CALCIUM SERPL-MCNC: 6.9 MG/DL (ref 8.3–10.6)
CHLORIDE SERPL-SCNC: 108 MMOL/L (ref 99–110)
CO2 BLDA-SCNC: 25.4 MMOL/L
CO2 BLDA-SCNC: 26.7 MMOL/L
CO2 SERPL-SCNC: 26 MMOL/L (ref 21–32)
COHGB MFR BLDA: 0.3 % (ref 0–1.5)
COHGB MFR BLDA: 0.3 % (ref 0–1.5)
CREAT SERPL-MCNC: 0.9 MG/DL (ref 0.8–1.3)
DEPRECATED RDW RBC AUTO: 16.5 % (ref 12.4–15.4)
EOSINOPHIL # BLD: 0.1 K/UL (ref 0–0.6)
EOSINOPHIL NFR BLD: 0.9 %
GFR SERPLBLD CREATININE-BSD FMLA CKD-EPI: >90 ML/MIN/{1.73_M2}
GLUCOSE BLD-MCNC: 216 MG/DL (ref 70–99)
GLUCOSE BLD-MCNC: 233 MG/DL (ref 70–99)
GLUCOSE BLD-MCNC: 238 MG/DL (ref 70–99)
GLUCOSE BLD-MCNC: 255 MG/DL (ref 70–99)
GLUCOSE BLD-MCNC: 277 MG/DL (ref 70–99)
GLUCOSE BLD-MCNC: 298 MG/DL (ref 70–99)
GLUCOSE SERPL-MCNC: 193 MG/DL (ref 70–99)
GRAM STN SPEC: ABNORMAL
HCO3 BLDA-SCNC: 24.1 MMOL/L (ref 21–29)
HCO3 BLDA-SCNC: 24.9 MMOL/L (ref 21–29)
HCT VFR BLD AUTO: 22.8 % (ref 40.5–52.5)
HGB BLD-MCNC: 7.4 G/DL (ref 13.5–17.5)
HGB BLDA-MCNC: 10.2 G/DL (ref 13.5–17.5)
HGB BLDA-MCNC: 6.7 G/DL (ref 13.5–17.5)
LYMPHOCYTES # BLD: 0.5 K/UL (ref 1–5.1)
LYMPHOCYTES NFR BLD: 8.9 %
MAGNESIUM SERPL-MCNC: 1.75 MG/DL (ref 1.8–2.4)
MCH RBC QN AUTO: 30.6 PG (ref 26–34)
MCHC RBC AUTO-ENTMCNC: 32.6 G/DL (ref 31–36)
MCV RBC AUTO: 93.8 FL (ref 80–100)
METHGB MFR BLDA: 0.1 %
METHGB MFR BLDA: 0.5 %
MONOCYTES # BLD: 0.6 K/UL (ref 0–1.3)
MONOCYTES NFR BLD: 10.5 %
MRSA DNA SPEC QL NAA+PROBE: ABNORMAL
NEUTROPHILS # BLD: 4.4 K/UL (ref 1.7–7.7)
NEUTROPHILS NFR BLD: 79.4 %
O2 THERAPY: ABNORMAL
O2 THERAPY: ABNORMAL
ORGANISM: ABNORMAL
ORGANISM: ABNORMAL
PCO2 BLDA: 40.7 MMHG (ref 35–45)
PCO2 BLDA: 60.7 MMHG (ref 35–45)
PERFORMED ON: ABNORMAL
PH BLDA: 7.23 [PH] (ref 7.35–7.45)
PH BLDA: 7.39 [PH] (ref 7.35–7.45)
PHOSPHATE SERPL-MCNC: 1.8 MG/DL (ref 2.5–4.9)
PLATELET # BLD AUTO: 259 K/UL (ref 135–450)
PMV BLD AUTO: 8 FL (ref 5–10.5)
PO2 BLDA: 79.7 MMHG (ref 75–108)
PO2 BLDA: 80.9 MMHG (ref 75–108)
POTASSIUM SERPL-SCNC: 3.4 MMOL/L (ref 3.5–5.1)
PROT SERPL-MCNC: 5.4 G/DL (ref 6.4–8.2)
RBC # BLD AUTO: 2.44 M/UL (ref 4.2–5.9)
SAO2 % BLDA: 93.5 %
SAO2 % BLDA: 95.7 %
SODIUM SERPL-SCNC: 140 MMOL/L (ref 136–145)
WBC # BLD AUTO: 5.6 K/UL (ref 4–11)

## 2025-02-16 PROCEDURE — 2700000000 HC OXYGEN THERAPY PER DAY

## 2025-02-16 PROCEDURE — 85025 COMPLETE CBC W/AUTO DIFF WBC: CPT

## 2025-02-16 PROCEDURE — 2500000003 HC RX 250 WO HCPCS: Performed by: STUDENT IN AN ORGANIZED HEALTH CARE EDUCATION/TRAINING PROGRAM

## 2025-02-16 PROCEDURE — 84100 ASSAY OF PHOSPHORUS: CPT

## 2025-02-16 PROCEDURE — 80048 BASIC METABOLIC PNL TOTAL CA: CPT

## 2025-02-16 PROCEDURE — 2580000003 HC RX 258: Performed by: INTERNAL MEDICINE

## 2025-02-16 PROCEDURE — 6370000000 HC RX 637 (ALT 250 FOR IP): Performed by: INTERNAL MEDICINE

## 2025-02-16 PROCEDURE — 71045 X-RAY EXAM CHEST 1 VIEW: CPT

## 2025-02-16 PROCEDURE — 94640 AIRWAY INHALATION TREATMENT: CPT

## 2025-02-16 PROCEDURE — 99233 SBSQ HOSP IP/OBS HIGH 50: CPT | Performed by: INTERNAL MEDICINE

## 2025-02-16 PROCEDURE — 6360000002 HC RX W HCPCS: Performed by: INTERNAL MEDICINE

## 2025-02-16 PROCEDURE — 94003 VENT MGMT INPAT SUBQ DAY: CPT

## 2025-02-16 PROCEDURE — 99291 CRITICAL CARE FIRST HOUR: CPT | Performed by: INTERNAL MEDICINE

## 2025-02-16 PROCEDURE — 2000000000 HC ICU R&B

## 2025-02-16 PROCEDURE — 2500000003 HC RX 250 WO HCPCS: Performed by: SURGERY

## 2025-02-16 PROCEDURE — 82803 BLOOD GASES ANY COMBINATION: CPT

## 2025-02-16 PROCEDURE — 99232 SBSQ HOSP IP/OBS MODERATE 35: CPT | Performed by: SURGERY

## 2025-02-16 PROCEDURE — 2500000003 HC RX 250 WO HCPCS: Performed by: INTERNAL MEDICINE

## 2025-02-16 PROCEDURE — 6370000000 HC RX 637 (ALT 250 FOR IP): Performed by: PSYCHIATRY & NEUROLOGY

## 2025-02-16 PROCEDURE — 83735 ASSAY OF MAGNESIUM: CPT

## 2025-02-16 PROCEDURE — 80076 HEPATIC FUNCTION PANEL: CPT

## 2025-02-16 PROCEDURE — 94761 N-INVAS EAR/PLS OXIMETRY MLT: CPT

## 2025-02-16 RX ORDER — DEXMEDETOMIDINE HYDROCHLORIDE 4 UG/ML
.1-1.5 INJECTION, SOLUTION INTRAVENOUS CONTINUOUS
Status: DISCONTINUED | OUTPATIENT
Start: 2025-02-16 | End: 2025-03-05

## 2025-02-16 RX ORDER — METOPROLOL TARTRATE 50 MG
50 TABLET ORAL 2 TIMES DAILY
Status: DISCONTINUED | OUTPATIENT
Start: 2025-02-16 | End: 2025-03-03

## 2025-02-16 RX ORDER — FUROSEMIDE 10 MG/ML
20 INJECTION INTRAMUSCULAR; INTRAVENOUS ONCE
Status: COMPLETED | OUTPATIENT
Start: 2025-02-16 | End: 2025-02-16

## 2025-02-16 RX ORDER — GABAPENTIN 250 MG/5ML
200 SOLUTION ORAL 4 TIMES DAILY
Status: DISCONTINUED | OUTPATIENT
Start: 2025-02-16 | End: 2025-02-28

## 2025-02-16 RX ORDER — MAGNESIUM SULFATE IN WATER 40 MG/ML
2000 INJECTION, SOLUTION INTRAVENOUS ONCE
Status: COMPLETED | OUTPATIENT
Start: 2025-02-16 | End: 2025-02-16

## 2025-02-16 RX ORDER — INSULIN GLARGINE 100 [IU]/ML
15 INJECTION, SOLUTION SUBCUTANEOUS NIGHTLY
Status: DISCONTINUED | OUTPATIENT
Start: 2025-02-16 | End: 2025-02-18

## 2025-02-16 RX ADMIN — PROPOFOL 25 MCG/KG/MIN: 10 INJECTION, EMULSION INTRAVENOUS at 13:32

## 2025-02-16 RX ADMIN — PIPERACILLIN AND TAZOBACTAM 3375 MG: 3; .375 INJECTION, POWDER, LYOPHILIZED, FOR SOLUTION INTRAVENOUS at 01:50

## 2025-02-16 RX ADMIN — METOPROLOL TARTRATE 50 MG: 50 TABLET, FILM COATED ORAL at 20:02

## 2025-02-16 RX ADMIN — PROPOFOL 45 MCG/KG/MIN: 10 INJECTION, EMULSION INTRAVENOUS at 08:25

## 2025-02-16 RX ADMIN — THIAMINE HYDROCHLORIDE 200 MG: 100 INJECTION, SOLUTION INTRAMUSCULAR; INTRAVENOUS at 11:03

## 2025-02-16 RX ADMIN — PROPOFOL 45 MCG/KG/MIN: 10 INJECTION, EMULSION INTRAVENOUS at 05:47

## 2025-02-16 RX ADMIN — PIPERACILLIN AND TAZOBACTAM 3375 MG: 3; .375 INJECTION, POWDER, LYOPHILIZED, FOR SOLUTION INTRAVENOUS at 09:36

## 2025-02-16 RX ADMIN — ENOXAPARIN SODIUM 40 MG: 100 INJECTION SUBCUTANEOUS at 08:25

## 2025-02-16 RX ADMIN — VANCOMYCIN HYDROCHLORIDE 1000 MG: 1 INJECTION, POWDER, LYOPHILIZED, FOR SOLUTION INTRAVENOUS at 18:49

## 2025-02-16 RX ADMIN — MIDAZOLAM HYDROCHLORIDE 2 MG: 1 INJECTION, SOLUTION INTRAMUSCULAR; INTRAVENOUS at 08:51

## 2025-02-16 RX ADMIN — INSULIN GLARGINE 15 UNITS: 100 INJECTION, SOLUTION SUBCUTANEOUS at 20:05

## 2025-02-16 RX ADMIN — SODIUM CHLORIDE, PRESERVATIVE FREE 10 ML: 5 INJECTION INTRAVENOUS at 08:26

## 2025-02-16 RX ADMIN — SODIUM CHLORIDE 15 MMOL: 9 INJECTION, SOLUTION INTRAVENOUS at 11:37

## 2025-02-16 RX ADMIN — ATORVASTATIN CALCIUM 40 MG: 40 TABLET, FILM COATED ORAL at 08:26

## 2025-02-16 RX ADMIN — INSULIN LISPRO 1 UNITS: 100 INJECTION, SOLUTION INTRAVENOUS; SUBCUTANEOUS at 05:51

## 2025-02-16 RX ADMIN — PIPERACILLIN AND TAZOBACTAM 3375 MG: 3; .375 INJECTION, POWDER, LYOPHILIZED, FOR SOLUTION INTRAVENOUS at 17:28

## 2025-02-16 RX ADMIN — PANTOPRAZOLE SODIUM 40 MG: 40 INJECTION, POWDER, LYOPHILIZED, FOR SOLUTION INTRAVENOUS at 08:25

## 2025-02-16 RX ADMIN — ALBUTEROL SULFATE 2.5 MG: 2.5 SOLUTION RESPIRATORY (INHALATION) at 11:59

## 2025-02-16 RX ADMIN — VANCOMYCIN HYDROCHLORIDE 1000 MG: 1 INJECTION, POWDER, LYOPHILIZED, FOR SOLUTION INTRAVENOUS at 08:24

## 2025-02-16 RX ADMIN — MIDAZOLAM HYDROCHLORIDE 2 MG: 1 INJECTION, SOLUTION INTRAMUSCULAR; INTRAVENOUS at 02:00

## 2025-02-16 RX ADMIN — FLUCONAZOLE 200 MG: 2 INJECTION, SOLUTION INTRAVENOUS at 11:01

## 2025-02-16 RX ADMIN — GABAPENTIN 200 MG: 250 SOLUTION ORAL at 17:26

## 2025-02-16 RX ADMIN — DEXMEDETOMIDINE HYDROCHLORIDE 0.6 MCG/KG/HR: 4 INJECTION, SOLUTION INTRAVENOUS at 18:44

## 2025-02-16 RX ADMIN — ASCORBIC ACID, VITAMIN A PALMITATE, CHOLECALCIFEROL, THIAMINE HYDROCHLORIDE, RIBOFLAVIN-5 PHOSPHATE SODIUM, PYRIDOXINE HYDROCHLORIDE, NIACINAMIDE, DEXPANTHENOL, ALPHA-TOCOPHEROL ACETATE, VITAMIN K1, FOLIC ACID, BIOTIN, CYANOCOBALAMIN: 200; 3300; 200; 6; 3.6; 6; 40; 15; 10; 150; 600; 60; 5 INJECTION, SOLUTION INTRAVENOUS at 17:33

## 2025-02-16 RX ADMIN — INSULIN LISPRO 2 UNITS: 100 INJECTION, SOLUTION INTRAVENOUS; SUBCUTANEOUS at 17:26

## 2025-02-16 RX ADMIN — ALLOPURINOL 300 MG: 300 TABLET ORAL at 08:26

## 2025-02-16 RX ADMIN — ALBUTEROL SULFATE 2.5 MG: 2.5 SOLUTION RESPIRATORY (INHALATION) at 20:08

## 2025-02-16 RX ADMIN — SODIUM CHLORIDE, PRESERVATIVE FREE 10 ML: 5 INJECTION INTRAVENOUS at 20:03

## 2025-02-16 RX ADMIN — SODIUM CHLORIDE: 9 INJECTION, SOLUTION INTRAVENOUS at 08:05

## 2025-02-16 RX ADMIN — MIDAZOLAM HYDROCHLORIDE 2 MG: 1 INJECTION, SOLUTION INTRAMUSCULAR; INTRAVENOUS at 19:53

## 2025-02-16 RX ADMIN — FOLIC ACID 1000 MCG: 1 TABLET ORAL at 08:26

## 2025-02-16 RX ADMIN — INSULIN LISPRO 2 UNITS: 100 INJECTION, SOLUTION INTRAVENOUS; SUBCUTANEOUS at 20:05

## 2025-02-16 RX ADMIN — MAGNESIUM SULFATE HEPTAHYDRATE 2000 MG: 40 INJECTION, SOLUTION INTRAVENOUS at 10:53

## 2025-02-16 RX ADMIN — ZONISAMIDE 100 MG: 100 CAPSULE ORAL at 08:28

## 2025-02-16 RX ADMIN — POTASSIUM CHLORIDE 20 MEQ: 400 INJECTION, SOLUTION INTRAVENOUS at 05:56

## 2025-02-16 RX ADMIN — ALBUTEROL SULFATE 2.5 MG: 2.5 SOLUTION RESPIRATORY (INHALATION) at 07:43

## 2025-02-16 RX ADMIN — FUROSEMIDE 20 MG: 10 INJECTION, SOLUTION INTRAMUSCULAR; INTRAVENOUS at 13:46

## 2025-02-16 RX ADMIN — Medication 5000 UNITS: at 08:26

## 2025-02-16 RX ADMIN — POTASSIUM CHLORIDE 20 MEQ: 400 INJECTION, SOLUTION INTRAVENOUS at 06:57

## 2025-02-16 RX ADMIN — GABAPENTIN 200 MG: 250 SOLUTION ORAL at 20:02

## 2025-02-16 RX ADMIN — THERA TABS 1 TABLET: TAB at 08:26

## 2025-02-16 RX ADMIN — METOPROLOL TARTRATE 25 MG: 25 TABLET, FILM COATED ORAL at 08:27

## 2025-02-16 RX ADMIN — DEXMEDETOMIDINE HYDROCHLORIDE 0.8 MCG/KG/HR: 4 INJECTION, SOLUTION INTRAVENOUS at 23:57

## 2025-02-16 RX ADMIN — PROPOFOL 20 MCG/KG/MIN: 10 INJECTION, EMULSION INTRAVENOUS at 20:58

## 2025-02-16 RX ADMIN — DEXMEDETOMIDINE HYDROCHLORIDE 0.2 MCG/KG/HR: 4 INJECTION, SOLUTION INTRAVENOUS at 10:49

## 2025-02-16 RX ADMIN — INSULIN LISPRO 2 UNITS: 100 INJECTION, SOLUTION INTRAVENOUS; SUBCUTANEOUS at 11:39

## 2025-02-16 RX ADMIN — PROPOFOL 45 MCG/KG/MIN: 10 INJECTION, EMULSION INTRAVENOUS at 01:19

## 2025-02-16 ASSESSMENT — PULMONARY FUNCTION TESTS
PIF_VALUE: 36
PIF_VALUE: 32
PIF_VALUE: 43
PIF_VALUE: 45
PIF_VALUE: 49
PIF_VALUE: 38
PIF_VALUE: 38
PIF_VALUE: 45
PIF_VALUE: 38
PIF_VALUE: 46
PIF_VALUE: 34
PIF_VALUE: 42
PIF_VALUE: 41
PIF_VALUE: 40
PIF_VALUE: 39
PIF_VALUE: 37
PIF_VALUE: 46
PIF_VALUE: 37
PIF_VALUE: 38
PIF_VALUE: 38
PIF_VALUE: 43
PIF_VALUE: 45
PIF_VALUE: 46
PIF_VALUE: 41

## 2025-02-16 NOTE — PROGRESS NOTES
Pulmonary & Critical Care Medicine ICU Progress Note    CC: COPD respiratory failure    Events of Last 24 hours:   TPN started  83 cc/hr   Propofol 40 mcg/kg/min    cc/hr  PEEP 5  FiO2 40%  Minimal secretions   Small BM last night       Vascular lines: IV: Left IJ     MV:   Vent Mode: AC/VC Resp Rate (Set): 24 bpm/Vt (Set, mL): 400 mL/ /FiO2 : 40 %  Recent Labs     02/15/25  0530 25  0302   PHART 7.357 7.230*   UFO7IDM 50.2* 60.7*   PO2ART 94.7 80.9       IV:   PN-Adult Premix 5/20 - Standard Electrolytes - Central Line      PN-Adult Premix 5/20 - Standard Electrolytes - Central Line 83 mL/hr at 25 0600    sodium chloride 100 mL/hr at 25 0600    propofol 45 mcg/kg/min (25 0600)    dextrose Stopped (25 1324)    sodium chloride      dextrose         Vitals:  Blood pressure (!) 153/54, pulse (!) 124, temperature 99.2 °F (37.3 °C), temperature source Bladder, resp. rate 24, height 1.702 m (5' 7.01\"), weight 88.6 kg (195 lb 5.2 oz), SpO2 99%.  on   Temp  Av.5 °F (36.9 °C)  Min: 98.1 °F (36.7 °C)  Max: 99.2 °F (37.3 °C)    Intake/Output Summary (Last 24 hours) at 2025 0751  Last data filed at 2025 0600  Gross per 24 hour   Intake 5278.04 ml   Output 1690 ml   Net 3588.04 ml     EXAM:  General: ill appearing.  Intubated sedated.  Eyes: No sclera icterus. No conjunctival injection.  ENT: No discharge. ET tube in place  Neck: Trachea midline.   Resp: No accessory muscle use. + crackles. No wheezing. + rhonchi.   CV: tachy rate. Regular rhythm. No mumur or rub. No edema.   GI: Non-tender. Non-distended.   Skin: Warm and dry. No rash on exposed extremities.  M/S: No cyanosis. No clubbing.   Neuro: Intubated sedated.  Not following commands.  Psych: Noncommunicative unable to obtain        Scheduled Meds:   vancomycin  1,000 mg IntraVENous Once    vancomycin  1,000 mg IntraVENous Q12H    albuterol  2.5 mg Nebulization 4x Daily RT    metoprolol tartrate  25 mg

## 2025-02-16 NOTE — PROGRESS NOTES
Speech Language Pathology  Attempt Note     Name: Sandor Early  : 1955  Medical Diagnosis: Hypomagnesemia [E83.42]  Influenza A [J10.1]  COPD exacerbation (HCC) [J44.1]  Respiratory failure with hypoxia [J96.91]  Alcohol use disorder [F10.90]  Acute on chronic respiratory failure with hypoxia and hypercapnia [J96.21, J96.22]  Acute respiratory failure [J96.00]      SLP attempted to see pt for dysphagia tx. Treatment unable to be completed as pt now requiring vent. SLP to sign off at this time. RN aware. No charges. Please re-consult when new needs arise.    Thank you,    Tayla Shelby M.A. SLP  Speech-Language Pathologist  [place] Lic #[id number]  [phone]

## 2025-02-16 NOTE — PROGRESS NOTES
02/16/25 0308   Patient Observation   Pulse (!) 113   Respirations 24   SpO2 96 %   Patient Observations 7.5 ett 24 at lip   Breath Sounds   Right Upper Lobe Diminished   Right Middle Lobe Diminished   Right Lower Lobe Diminished   Left Upper Lobe Diminished   Left Lower Lobe Diminished   Vent Information   Vent Mode AC/VC   Ventilator Settings   FiO2  40 %   Vt (Set, mL) 400 mL   Resp Rate (Set) 24 bpm   PEEP/CPAP (cmH2O) 5   Vent Patient Data (Readings)   Vt (Measured) 415 mL   Peak Inspiratory Pressure (cmH2O) 38 cmH2O   Rate Measured 24 br/min   Minute Volume (L/min) 9.94 Liters   Mean Airway Pressure (cmH2O) 11 cmH20   Plateau Pressure (cm H2O) 20 cm H2O   Driving Pressure 15   I:E Ratio 1:3.60   Flow Sensitivity 3 L/min   Vent Alarm Settings   High Pressure (cmH2O) 40 cmH2O   Low Minute Volume (lpm) 4 L/min   Low Exhaled Vt (ml) 250 mL   RR High (bpm) 35 br/min   Apnea (secs) 20 secs   Additional Respiratoray Assessments   Humidification Source HME   Ambu Bag With Mask At Bedside Yes   Airway Clearance   Suction ET Tube   Suction Device Inline suction catheter   Sputum Method Obtained Endotracheal   Sputum Amount Small   Sputum Color/Odor Clear   Sputum Consistency Thick   ETT    Placement Date/Time: 02/14/25 1135   Present on Admission/Arrival: No  Placed By: RT  Placement Verified By: Auscultation;Colorimetric ETCO2 device;Chest X-ray  Preoxygenation: Yes  Airway Tube Size: 7.5 mm  Secured At: 24 cm  Measured From: Lips   Secured At 24 cm   Measured From Lips   ETT Placement Center   Secured By Commercial tube may

## 2025-02-16 NOTE — PROGRESS NOTES
TPN day 3  Patient has been at goal rate as of yesterday evening's bag at 83mL/hr (2/15/25 at 1800).  Will continue same formulation and rate.      Recommend to add PRN sliding scale replacement orders for Magnesium and Phos to replenish vs adding to the bag.  PT will receive more electrolytes now that he is at the goal rate, but additional supplementation is likely based on AM labs.  Monitor blood sugars and consider adjustments to sliding scales as patient's caloric intake has increased with the rate change.  Sathya Wright RPH PharmD 2/16/2025 7:26 AM

## 2025-02-16 NOTE — PROGRESS NOTES
02/15/25 2308   Patient Observation   Pulse 99   Respirations 24   SpO2 99 %   Patient Observations 7.5 ett 24 at lip   Breath Sounds   Right Upper Lobe Diminished   Right Middle Lobe Diminished   Right Lower Lobe Diminished   Left Upper Lobe Diminished   Left Lower Lobe Diminished   Vent Information   Vent Mode AC/VC   Ventilator Settings   FiO2  40 %   Vt (Set, mL) 400 mL   Resp Rate (Set) 24 bpm   PEEP/CPAP (cmH2O) 5   Vent Patient Data (Readings)   Vt (Measured) 413 mL   Peak Inspiratory Pressure (cmH2O) 39 cmH2O   Rate Measured 24 br/min   Minute Volume (L/min) 9.92 Liters   Mean Airway Pressure (cmH2O) 12 cmH20   Plateau Pressure (cm H2O) 20 cm H2O   Driving Pressure 15   I:E Ratio 1:3.60   Flow Sensitivity 3 L/min   Vent Alarm Settings   High Pressure (cmH2O) 40 cmH2O   Low Minute Volume (lpm) 4 L/min   Low Exhaled Vt (ml) 250 mL   RR High (bpm) 35 br/min   Apnea (secs) 20 secs   Additional Respiratoray Assessments   Humidification Source HME   Ambu Bag With Mask At Bedside Yes   Airway Clearance   Suction ET Tube   Suction Device Inline suction catheter   Sputum Method Obtained Endotracheal   Sputum Amount Small   Sputum Color/Odor Clear   Sputum Consistency Thick   ETT    Placement Date/Time: 02/14/25 1135   Present on Admission/Arrival: No  Placed By: RT  Placement Verified By: Auscultation;Colorimetric ETCO2 device;Chest X-ray  Preoxygenation: Yes  Airway Tube Size: 7.5 mm  Secured At: 24 cm  Measured From: Lips   Secured At 24 cm   Measured From Lips   ETT Placement Right   Secured By Commercial tube may

## 2025-02-16 NOTE — PROGRESS NOTES
Progress Note    Admit Date:  1/30/2025    Interval history:  Influenza A and Acute respiratory failure   Was on bipap in iCU and improved, transferred to [facility]        Has NG placed for Ileus  , he pulled out NG and likely has aspirated became hypoxic and transferred to ICU      Seizure like event 2/7,   Remains confused  MRI brain incomplete .     2/13   Mentation worse today  On precedex   On 4 L of O2     2/16  Remains on vent support  Small BM but still with significant NG output  Sputum with MRSA   On TPN     Subjective:  Mr. Early seen sedated on vent no distress  No fevers. BP stable     Objective:   Patient Vitals for the past 4 hrs:   BP Temp Temp src Pulse Resp SpO2   02/16/25 0700 (!) 153/54 -- -- (!) 124 24 98 %   02/16/25 0600 (!) 156/55 99.2 °F (37.3 °C) Bladder (!) 122 24 96 %   02/16/25 0500 (!) 150/54 -- -- (!) 119 21 96 %   02/16/25 0400 (!) 146/56 98.9 °F (37.2 °C) Bladder (!) 114 24 96 %          Intake/Output Summary (Last 24 hours) at 2/16/2025 0750  Last data filed at 2/16/2025 0600  Gross per 24 hour   Intake 5278.04 ml   Output 1690 ml   Net 3588.04 ml       Physical Exam:        General:  elderly male sedated on vent  Oral ETT and OG noted  Left IJ TLC . Appears to be not in any distress  Mucous Membranes:  Pink , anicteric  Neck: No JVD, no carotid bruit, no thyromegaly  Chest:  Clear to auscultation bilaterally, minimal wheeze allyson  Cardiovascular:  RRR S1S2 heard, no murmurs or gallops  Abdomen:  Soft, obese +distended, non tender, no organomegaly, BS absent  Extremities: trace edema to ext   Distal pulses well felt  Neurological : sedated        Medications:  vancomycin, 1,000 mg, Once  vancomycin, 1,000 mg, Q12H  albuterol, 2.5 mg, 4x Daily RT  metoprolol tartrate, 25 mg, BID  fluconazole, 200 mg, Q24H  midazolam, 5 mg, Once  fat emulsion, 250 mL, Once per day on Monday Thursday  sodium chloride flush, 5-40 mL, 2 times per day  piperacillin-tazobactam, 3,375 mg, Q8H  zonisamide, 100  mg, Daily  insulin glargine, 7 Units, Nightly  [Held by provider] QUEtiapine, 50 mg, BID  [Held by provider] gabapentin, 200 mg, 4x Daily  thiamine (B-1) 200 mg in sodium chloride 0.9 % 100 mL IVPB, 200 mg, Q24H  multivitamin, 1 tablet, Daily  pantoprazole (PROTONIX) 40 mg in sodium chloride (PF) 0.9 % 10 mL injection, 40 mg, Daily  enoxaparin, 40 mg, Daily  insulin lispro, 0-4 Units, 4x Daily AC & HS  [Held by provider] dilTIAZem, 180 mg, Daily  allopurinol, 300 mg, Daily  vitamin D3, 5,000 Units, Daily  [Held by provider] furosemide, 20 mg, Daily  folic acid, 1,000 mcg, Daily  [Held by provider] DULoxetine, 60 mg, Daily  atorvastatin, 40 mg, Daily      PRN Medications:  midazolam, 2 mg, Q1H PRN  fentanNYL, 50 mcg, Q2H PRN  sodium chloride flush, 5-40 mL, PRN  diatrizoate meglumine-sodium, 12 mL, ONCE PRN  guaiFENesin, 200 mg, Q4H PRN  benzocaine, , 4x Daily PRN  ALPRAZolam, 0.5 mg, ONCE PRN  phenol, 1 spray, Q2H PRN  sodium chloride, , PRN  ondansetron, 4 mg, Q8H PRN   Or  ondansetron, 4 mg, Q6H PRN  acetaminophen, 650 mg, Q6H PRN   Or  acetaminophen, 650 mg, Q6H PRN  benzonatate, 100 mg, TID PRN  dextrose bolus, 125 mL, PRN   Or  dextrose bolus, 250 mL, PRN  glucagon (rDNA), 1 mg, PRN  dextrose, , Continuous PRN  potassium chloride, 20 mEq, PRN   Or  potassium chloride, 10 mEq, PRN  magnesium sulfate, 2,000 mg, PRN  calcium carbonate, 500 mg, TID PRN  oxyCODONE HCl, 10 mg, Q8H PRN  glucose, 4 tablet, PRN          Data:  CBC:   Recent Labs     02/14/25  0433 02/15/25  0342 02/16/25  0307   WBC 13.6* 7.1 5.6   HGB 8.9* 7.3* 7.4*   HCT 27.3* 22.0* 22.8*   MCV 93.4 91.9 93.8    254 259     BMP:   Recent Labs     02/14/25  0433 02/15/25  0342 02/16/25  0307    139 140   K 4.7 3.3* 3.4*    104 108   CO2 25 27 26   PHOS 5.1* 2.7 1.8*   BUN 28* 23* 15   CREATININE 1.5* 1.2 0.9     LIVER PROFILE:   Recent Labs     02/14/25  0433 02/15/25  0342 02/16/25  0307   AST 36 23 21   ALT 11 8* 7*   BILIDIR 0.2

## 2025-02-16 NOTE — PROGRESS NOTES
Pt's ABG this AM resulted at 7.23/60.7/80.9/24.9/94%.  Current vent settings are 24/400/5/40%.  Message sent to [employer] with above results.  No changes to be made.  Plan of care ongoing.

## 2025-02-16 NOTE — PLAN OF CARE
Problem: Pain  Goal: Verbalizes/displays adequate comfort level or baseline comfort level  2/15/2025 1925 by Conrad Gage RN  Outcome: Progressing  Flowsheets  Taken 2/15/2025 1900 by Conrad Gage RN  Verbalizes/displays adequate comfort level or baseline comfort level:   Assess pain using appropriate pain scale   Implement non-pharmacological measures as appropriate and evaluate response    Problem: Respiratory - Adult  Goal: Achieves optimal ventilation and oxygenation  2/15/2025 1925 by Conrad Gage RN  Outcome: Progressing  Flowsheets (Taken 2/15/2025 1900)  Achieves optimal ventilation and oxygenation:   Assess for changes in respiratory status   Assess for changes in mentation and behavior   Position to facilitate oxygenation and minimize respiratory effort   Oxygen supplementation based on oxygen saturation or arterial blood gases  2/15/2025 1014 by Josi Davis RN  Outcome: Progressing     Problem: Cardiovascular - Adult  Goal: Maintains optimal cardiac output and hemodynamic stability  Outcome: Progressing  Flowsheets  Taken 2/15/2025 1900 by Conrad Gage RN  Maintains optimal cardiac output and hemodynamic stability:   Monitor blood pressure and heart rate   Monitor urine output and notify Licensed Independent Practitioner for values outside of normal range  Taken 2/15/2025 0935 by Josi Davis RN  Maintains optimal cardiac output and hemodynamic stability: Monitor blood pressure and heart rate  Goal: Absence of cardiac dysrhythmias or at baseline  Outcome: Progressing  Flowsheets  Taken 2/15/2025 1900 by Conrad Gage RN  Absence of cardiac dysrhythmias or at baseline: Monitor cardiac rate and rhythm  Taken 2/15/2025 0935 by Josi Davis RN  Absence of cardiac dysrhythmias or at baseline: Monitor cardiac rate and rhythm

## 2025-02-16 NOTE — PROGRESS NOTES
Progress Note    Patient Sandor Early  MRN: [id number]  YOB: 1955 Age: 69 y.o. Sex: male  Room: [address] [place] [address]       Admitting Physician: Luda Richey MD   Date of Admission: 1/30/2025  5:28 PM   Primary Care Physician: Juliet Mayorga MD     Subjective:  Sandor Early was seen and examined. We are following for  abdominal distention, small bowel obstruction, worsening inflammatory changes involving the ileum.  -- Patient remains intubated, on the ventilator.  Still with significant NG tube output.  Patient had a small bowel movement.  Stools were dark in color however no blood in the stools.      Objective:  Vital Signs:   Vitals:    02/16/25 1000   BP:    Pulse:    Resp:    Temp: 98.8 °F (37.1 °C)   SpO2:          Physical Exam:      Intake/Output:    Intake/Output Summary (Last 24 hours) at 2/16/2025 1108  Last data filed at 2/16/2025 1000  Gross per 24 hour   Intake 5745.15 ml   Output 1540 ml   Net 4205.15 ml        Current Medications:  Current Facility-Administered Medications   Medication Dose Route Frequency Provider Last Rate Last Admin    vancomycin (VANCOCIN) 1,000 mg in sodium chloride 0.9 % 250 mL IVPB  1,000 mg IntraVENous Q12H Philip Romero MD        PN-Adult Premix 5/20 - Standard Electrolytes - Central Line   IntraVENous Continuous TPN Emory Penaloza MD        magnesium sulfate 2000 mg in 50 mL IVPB premix  2,000 mg IntraVENous Once Philip Romero MD 25 mL/hr at 02/16/25 1053 2,000 mg at 02/16/25 1053    potassium phosphate 15 mmol in sodium chloride 0.9 % 250 mL IVPB  15 mmol IntraVENous Once Philip Romero MD        metoprolol tartrate (LOPRESSOR) tablet 50 mg  50 mg Oral BID Philip Romero MD        dexmedeTOMIDine (PRECEDEX) 400 mcg in sodium chloride 0.9 % 100 mL infusion  0.1-1.5 mcg/kg/hr IntraVENous Continuous Philip Romero MD 4.4 mL/hr at 02/16/25 1049 0.2 mcg/kg/hr at 02/16/25 1049    PN-Adult Premix 5/20 - Standard Electrolytes - Central Line   IntraVENous  HISTORY: mechanical ventilation  TECHNOLOGIST PROVIDED HISTORY:  Reason for exam:->mechanical ventilation  Reason for Exam: vent    FINDINGS:  No change in the lines and tubes.  There is bibasilar scarring.  Calcified  granulomatous disease is noted.  The cardiac silhouette is within normal  limits.  There is no pneumothorax or pleural effusion.  Impression: 1. No significant change.       Labs:   Recent Labs     02/14/25  0433 02/15/25  0342 02/16/25  0307   WBC 13.6* 7.1 5.6   HGB 8.9* 7.3* 7.4*    254 259   ALKPHOS 136* 117 126   ALT 11 8* 7*   AST 36 23 21   BILITOT 0.9 0.5 0.4   BILIDIR 0.2 0.4* 0.3   IBILI 0.7 0.1 0.1   BUN 28* 23* 15   CREATININE 1.5* 1.2 0.9    139 140   K 4.7 3.3* 3.4*          Assessment:  Hospital Problems             Last Modified POA    * (Principal) Respiratory failure with hypoxia 1/30/2025 Yes    Seizure-like activity (HCC) 2/15/2025 Yes    Overview Signed 1/7/2013  3:28 PM by Cooper Martinez MD     ongoing, began after swine flu vaccination         Tobacco abuse 2/3/2025 Yes    Acute on chronic respiratory failure with hypoxia and hypercapnia 1/31/2025 Yes    Diabetes mellitus (HCC) 1/31/2025 Yes    Acute respiratory failure 1/30/2025 Yes    Influenza A 1/31/2025 Yes    Alcohol use disorder 2/1/2025 Yes    Hypomagnesemia 2/1/2025 Yes    SBO (small bowel obstruction) ([facility]) 2/5/2025 Yes    Acute encephalopathy 2/7/2025 Yes    Hypernatremia 2/10/2025 Yes    Disorder of electrolytes 2/10/2025 Yes    Ileus ([facility]) 2/10/2025 Yes    Agitation 2/10/2025 Yes    Chest pain 2/10/2025 Yes    Alcohol withdrawal syndrome, with delirium ([facility]) 2/11/2025 Yes    Pulmonary congestion 2/13/2025 Yes    Acute kidney injury (HCC) 2/14/2025 Yes    Tracheobronchitis 2/15/2025 Yes    Enteritis 2/15/2025 Yes       69-year-old male admitted with shortness of breath, evaluated for persistent abdominal distention, worsening inflammatory changes involving the terminal ileum on CT.  Patient

## 2025-02-16 NOTE — PROGRESS NOTES
02/15/25 1949   Patient Observation   Pulse (!) 106   Respirations 24   SpO2 98 %   Patient Observations 7.5 ett 24 at lip   Breath Sounds   Right Upper Lobe Diminished   Right Middle Lobe Diminished   Right Lower Lobe Diminished   Left Upper Lobe Diminished   Left Lower Lobe Diminished   Vent Information   Vent Mode AC/VC   Ventilator Settings   FiO2  40 %   Vt (Set, mL) 400 mL   Resp Rate (Set) 24 bpm   PEEP/CPAP (cmH2O) 5   Vent Patient Data (Readings)   Vt (Measured) 415 mL   Peak Inspiratory Pressure (cmH2O) 38 cmH2O   Rate Measured 24 br/min   Minute Volume (L/min) 9.92 Liters   Mean Airway Pressure (cmH2O) 11 cmH20   Plateau Pressure (cm H2O) 16 cm H2O   Driving Pressure 11   I:E Ratio 1:3.60   Flow Sensitivity 3 L/min   Static Compliance (L/cm H2O) 27   Dynamic Compliance (L/cm H2O) 12 L/cm H2O   Vent Alarm Settings   High Pressure (cmH2O) 40 cmH2O   Low Minute Volume (lpm) 4 L/min   Low Exhaled Vt (ml) 250 mL   RR High (bpm) 35 br/min   Apnea (secs) 20 secs   Additional Respiratoray Assessments   Humidification Source HME   Ambu Bag With Mask At Bedside Yes   Airway Clearance   Suction ET Tube   Suction Device Inline suction catheter   Sputum Method Obtained Endotracheal   Sputum Amount Small   Sputum Color/Odor White   Sputum Consistency Thick   ETT    Placement Date/Time: 02/14/25 1135   Present on Admission/Arrival: No  Placed By: RT  Placement Verified By: Auscultation;Colorimetric ETCO2 device;Chest X-ray  Preoxygenation: Yes  Airway Tube Size: 7.5 mm  Secured At: 24 cm  Measured From: Lips   Secured At 24 cm   Measured From Lips   ETT Placement Left   Secured By Commercial tube may   Treatment   $Treatment Type $Inhaled Therapy/Meds

## 2025-02-16 NOTE — PROGRESS NOTES
02/15/25 1900   RT Protocol   History Pulmonary Disease 2   Respiratory pattern 2   Breath sounds 2   Cough 1   Indications for Bronchodilator Therapy Decreased or absent breath sounds   Bronchodilator Assessment Score 7     RT Inhaler-Nebulizer Bronchodilator Protocol Note    There is a bronchodilator order in the chart from a provider indicating to follow the RT Bronchodilator Protocol and there is an “Initiate RT Inhaler-Nebulizer Bronchodilator Protocol” order as well (see protocol at bottom of note).    CXR Findings:  XR CHEST PORTABLE    Result Date: 2/15/2025  Endotracheal tube, nasogastric tube, and left jugular line are stable.  NG tube could be advanced by 5-10 cm for better positioning. Bronchial thickening and peribronchial opacities with suspicion for bronchiolitis..     XR CHEST PORTABLE    Result Date: 2/14/2025  Support tubes and lines as described above. Atelectasis or infiltrate in the left lung base. Right perihilar atelectasis or infiltrate.       The findings from the last RT Protocol Assessment were as follows:   History Pulmonary Disease: Chronic pulmonary disease  Respiratory Pattern: Dyspnea on exertion or RR 21-25 bpm  Breath Sounds: Slightly diminished and/or crackles  Cough: Strong, productive  Indication for Bronchodilator Therapy: Decreased or absent breath sounds  Bronchodilator Assessment Score: 7    Aerosolized bronchodilator medication orders have been revised according to the RT Inhaler-Nebulizer Bronchodilator Protocol below.    Respiratory Therapist to perform RT Therapy Protocol Assessment initially then follow the protocol.  Repeat RT Therapy Protocol Assessment PRN for score 0-3 or on second treatment, BID, and PRN for scores above 3.    No Indications - adjust the frequency to every 6 hours PRN wheezing or bronchospasm, if no treatments needed after 48 hours then discontinue using Per Protocol order mode.     If indication present, adjust the RT bronchodilator orders based

## 2025-02-16 NOTE — PROGRESS NOTES
Vanco day 2  SCr has improved, will dose adjust vanco to a 1g dose x1 now then continue with 1g IV Q12hrs until discontinued.  Trough check in AM 2/17.  Pred AUC 5132, Tr 14.9  Sathya Wright KIMBERLEE PharmD 2/16/2025 7:11 AM

## 2025-02-16 NOTE — PROGRESS NOTES
[facility]    PATIENT NAME: Sandor Early     TODAY'S DATE: 2/16/2025    CHIEF COMPLAINT: N/A    INTERVAL HISTORY/HPI:    Pt remains sedated on ventilator.     REVIEW OF SYSTEMS:  Pertinent positives and negatives as per interval history section    OBJECTIVE:  VITALS:  BP (!) 136/50   Pulse (!) 115   Temp 98.8 °F (37.1 °C) (Bladder)   Resp 24   Ht 1.702 m (5' 7.01\")   Wt 88.6 kg (195 lb 5.2 oz)   SpO2 98%   BMI 30.59 kg/m²     INTAKE/OUTPUT:    I/O last 3 completed shifts:  In: 7709.1 [I.V.:4322.2; IV Piggyback:1142.9]  Out: 2745 [Urine:2645; Emesis/NG output:100]  I/O this shift:  In: 1183.2 [I.V.:494.3; IV Piggyback:356.9]  Out: 250 [Urine:250]    CONSTITUTIONAL: Intubated and sedated  LUNGS: Diminished breath sounds at bases  CARD:  tachycardic with regular rhythm  ABDOMEN:  normal bowel sounds, soft, non-distended, no pain response to palpation    Data:  CBC:   Recent Labs     02/14/25  0433 02/15/25  0342 02/16/25  0307   WBC 13.6* 7.1 5.6   HGB 8.9* 7.3* 7.4*   HCT 27.3* 22.0* 22.8*    254 259     BMP:    Recent Labs     02/14/25  0433 02/15/25  0342 02/16/25  0307    139 140   K 4.7 3.3* 3.4*    104 108   CO2 25 27 26   BUN 28* 23* 15   CREATININE 1.5* 1.2 0.9   GLUCOSE 90 146* 193*     Hepatic:   Recent Labs     02/14/25  0433 02/15/25  0342 02/16/25  0307   AST 36 23 21   ALT 11 8* 7*   BILITOT 0.9 0.5 0.4   ALKPHOS 136* 117 126     Mag:      Recent Labs     02/14/25  0433 02/15/25  0342 02/16/25  0307   MG 1.86 1.80 1.75*      Phos:     Recent Labs     02/14/25 0433 02/15/25  0342 02/16/25  0307   PHOS 5.1* 2.7 1.8*      INR: No results for input(s): \"INR\" in the last 72 hours.    Radiology Review:  *Imaging personally reviewed by me.   Chest x-ray shows stable change      ASSESSMENT AND PLAN:  Enteritis with secondary partial small bowel obstruction and underlying metabolic encephalopathy with pulmonary disease and alcohol withdrawal. Continue antibiotics. The

## 2025-02-17 ENCOUNTER — APPOINTMENT (OUTPATIENT)
Dept: GENERAL RADIOLOGY | Age: 70
DRG: 207 | End: 2025-02-17
Payer: MEDICARE

## 2025-02-17 LAB
ABO + RH BLD: NORMAL
ALBUMIN SERPL-MCNC: 1.6 G/DL (ref 3.4–5)
ALP SERPL-CCNC: 138 U/L (ref 40–129)
ALT SERPL-CCNC: 8 U/L (ref 10–40)
ANION GAP SERPL CALCULATED.3IONS-SCNC: 6 MMOL/L (ref 3–16)
AST SERPL-CCNC: 20 U/L (ref 15–37)
BACTERIA BLD CULT ORG #2: NORMAL
BACTERIA BLD CULT: NORMAL
BASE EXCESS BLDA CALC-SCNC: 0.3 MMOL/L (ref -3–3)
BASOPHILS # BLD: 0 K/UL (ref 0–0.2)
BASOPHILS NFR BLD: 0.5 %
BILIRUB DIRECT SERPL-MCNC: 0.2 MG/DL (ref 0–0.3)
BILIRUB INDIRECT SERPL-MCNC: 0.1 MG/DL (ref 0–1)
BILIRUB SERPL-MCNC: 0.3 MG/DL (ref 0–1)
BLD GP AB SCN SERPL QL: NORMAL
BLOOD BANK DISPENSE STATUS: NORMAL
BLOOD BANK PRODUCT CODE: NORMAL
BPU ID: NORMAL
BUN SERPL-MCNC: 17 MG/DL (ref 7–20)
CALCIUM SERPL-MCNC: 7.1 MG/DL (ref 8.3–10.6)
CHLORIDE SERPL-SCNC: 109 MMOL/L (ref 99–110)
CO2 BLDA-SCNC: 28.2 MMOL/L
CO2 SERPL-SCNC: 26 MMOL/L (ref 21–32)
COHGB MFR BLDA: 0.3 % (ref 0–1.5)
CREAT SERPL-MCNC: 0.9 MG/DL (ref 0.8–1.3)
DEPRECATED RDW RBC AUTO: 16.8 % (ref 12.4–15.4)
DESCRIPTION BLOOD BANK: NORMAL
EOSINOPHIL # BLD: 0 K/UL (ref 0–0.6)
EOSINOPHIL NFR BLD: 1.1 %
GFR SERPLBLD CREATININE-BSD FMLA CKD-EPI: >90 ML/MIN/{1.73_M2}
GLUCOSE BLD-MCNC: 239 MG/DL (ref 70–99)
GLUCOSE BLD-MCNC: 279 MG/DL (ref 70–99)
GLUCOSE BLD-MCNC: 289 MG/DL (ref 70–99)
GLUCOSE BLD-MCNC: 302 MG/DL (ref 70–99)
GLUCOSE BLD-MCNC: 303 MG/DL (ref 70–99)
GLUCOSE BLD-MCNC: 331 MG/DL (ref 70–99)
GLUCOSE SERPL-MCNC: 272 MG/DL (ref 70–99)
HCO3 BLDA-SCNC: 26.6 MMOL/L (ref 21–29)
HCT VFR BLD AUTO: 21.5 % (ref 40.5–52.5)
HCT VFR BLD AUTO: 25.6 % (ref 40.5–52.5)
HGB BLD-MCNC: 7 G/DL (ref 13.5–17.5)
HGB BLD-MCNC: 8.2 G/DL (ref 13.5–17.5)
HGB BLDA-MCNC: 7.6 G/DL (ref 13.5–17.5)
LYMPHOCYTES # BLD: 0.4 K/UL (ref 1–5.1)
LYMPHOCYTES NFR BLD: 11.3 %
MAGNESIUM SERPL-MCNC: 1.76 MG/DL (ref 1.8–2.4)
MCH RBC QN AUTO: 30.4 PG (ref 26–34)
MCHC RBC AUTO-ENTMCNC: 32.4 G/DL (ref 31–36)
MCV RBC AUTO: 93.7 FL (ref 80–100)
METHGB MFR BLDA: 0.3 %
MONOCYTES # BLD: 0.4 K/UL (ref 0–1.3)
MONOCYTES NFR BLD: 10.8 %
NEUTROPHILS # BLD: 3 K/UL (ref 1.7–7.7)
NEUTROPHILS NFR BLD: 76.3 %
O2 THERAPY: ABNORMAL
PCO2 BLDA: 52.7 MMHG (ref 35–45)
PERFORMED ON: ABNORMAL
PH BLDA: 7.32 [PH] (ref 7.35–7.45)
PHOSPHATE SERPL-MCNC: 2.6 MG/DL (ref 2.5–4.9)
PLATELET # BLD AUTO: 220 K/UL (ref 135–450)
PMV BLD AUTO: 7.5 FL (ref 5–10.5)
PO2 BLDA: 111.3 MMHG (ref 75–108)
POTASSIUM SERPL-SCNC: 3.8 MMOL/L (ref 3.5–5.1)
PROT SERPL-MCNC: 5.6 G/DL (ref 6.4–8.2)
RBC # BLD AUTO: 2.3 M/UL (ref 4.2–5.9)
SAO2 % BLDA: 97.7 %
SODIUM SERPL-SCNC: 141 MMOL/L (ref 136–145)
VANCOMYCIN TROUGH SERPL-MCNC: 16.1 UG/ML (ref 10–20)
WBC # BLD AUTO: 3.9 K/UL (ref 4–11)

## 2025-02-17 PROCEDURE — 86901 BLOOD TYPING SEROLOGIC RH(D): CPT

## 2025-02-17 PROCEDURE — 6370000000 HC RX 637 (ALT 250 FOR IP): Performed by: INTERNAL MEDICINE

## 2025-02-17 PROCEDURE — 85018 HEMOGLOBIN: CPT

## 2025-02-17 PROCEDURE — 6360000002 HC RX W HCPCS: Performed by: INTERNAL MEDICINE

## 2025-02-17 PROCEDURE — 84100 ASSAY OF PHOSPHORUS: CPT

## 2025-02-17 PROCEDURE — 2580000003 HC RX 258: Performed by: INTERNAL MEDICINE

## 2025-02-17 PROCEDURE — 30233N1 TRANSFUSION OF NONAUTOLOGOUS RED BLOOD CELLS INTO PERIPHERAL VEIN, PERCUTANEOUS APPROACH: ICD-10-PCS | Performed by: INTERNAL MEDICINE

## 2025-02-17 PROCEDURE — 36430 TRANSFUSION BLD/BLD COMPNT: CPT

## 2025-02-17 PROCEDURE — 94640 AIRWAY INHALATION TREATMENT: CPT

## 2025-02-17 PROCEDURE — 94003 VENT MGMT INPAT SUBQ DAY: CPT

## 2025-02-17 PROCEDURE — 82803 BLOOD GASES ANY COMBINATION: CPT

## 2025-02-17 PROCEDURE — 80076 HEPATIC FUNCTION PANEL: CPT

## 2025-02-17 PROCEDURE — 2700000000 HC OXYGEN THERAPY PER DAY

## 2025-02-17 PROCEDURE — 85014 HEMATOCRIT: CPT

## 2025-02-17 PROCEDURE — 2500000003 HC RX 250 WO HCPCS: Performed by: STUDENT IN AN ORGANIZED HEALTH CARE EDUCATION/TRAINING PROGRAM

## 2025-02-17 PROCEDURE — 83735 ASSAY OF MAGNESIUM: CPT

## 2025-02-17 PROCEDURE — 86900 BLOOD TYPING SEROLOGIC ABO: CPT

## 2025-02-17 PROCEDURE — 86850 RBC ANTIBODY SCREEN: CPT

## 2025-02-17 PROCEDURE — 99291 CRITICAL CARE FIRST HOUR: CPT | Performed by: INTERNAL MEDICINE

## 2025-02-17 PROCEDURE — 2500000003 HC RX 250 WO HCPCS: Performed by: INTERNAL MEDICINE

## 2025-02-17 PROCEDURE — 2000000000 HC ICU R&B

## 2025-02-17 PROCEDURE — 86923 COMPATIBILITY TEST ELECTRIC: CPT

## 2025-02-17 PROCEDURE — 80202 ASSAY OF VANCOMYCIN: CPT

## 2025-02-17 PROCEDURE — 99233 SBSQ HOSP IP/OBS HIGH 50: CPT | Performed by: INTERNAL MEDICINE

## 2025-02-17 PROCEDURE — 80048 BASIC METABOLIC PNL TOTAL CA: CPT

## 2025-02-17 PROCEDURE — 85025 COMPLETE CBC W/AUTO DIFF WBC: CPT

## 2025-02-17 PROCEDURE — 30233N1 TRANSFUSION OF NONAUTOLOGOUS RED BLOOD CELLS INTO PERIPHERAL VEIN, PERCUTANEOUS APPROACH: ICD-10-PCS

## 2025-02-17 PROCEDURE — 2500000003 HC RX 250 WO HCPCS: Performed by: SURGERY

## 2025-02-17 PROCEDURE — 71045 X-RAY EXAM CHEST 1 VIEW: CPT

## 2025-02-17 PROCEDURE — 94761 N-INVAS EAR/PLS OXIMETRY MLT: CPT

## 2025-02-17 PROCEDURE — 6370000000 HC RX 637 (ALT 250 FOR IP): Performed by: PSYCHIATRY & NEUROLOGY

## 2025-02-17 PROCEDURE — 99232 SBSQ HOSP IP/OBS MODERATE 35: CPT | Performed by: SURGERY

## 2025-02-17 PROCEDURE — P9016 RBC LEUKOCYTES REDUCED: HCPCS

## 2025-02-17 PROCEDURE — 6370000000 HC RX 637 (ALT 250 FOR IP): Performed by: SURGERY

## 2025-02-17 RX ORDER — DILTIAZEM HCL 60 MG
60 TABLET ORAL EVERY 8 HOURS SCHEDULED
Status: DISCONTINUED | OUTPATIENT
Start: 2025-02-17 | End: 2025-03-07

## 2025-02-17 RX ORDER — INSULIN LISPRO 100 [IU]/ML
0-16 INJECTION, SOLUTION INTRAVENOUS; SUBCUTANEOUS
Status: DISCONTINUED | OUTPATIENT
Start: 2025-02-17 | End: 2025-02-17

## 2025-02-17 RX ORDER — SODIUM CHLORIDE 9 MG/ML
INJECTION, SOLUTION INTRAVENOUS PRN
Status: DISCONTINUED | OUTPATIENT
Start: 2025-02-17 | End: 2025-03-06

## 2025-02-17 RX ORDER — CHLORDIAZEPOXIDE HYDROCHLORIDE 25 MG/1
50 CAPSULE, GELATIN COATED ORAL 3 TIMES DAILY
Status: DISCONTINUED | OUTPATIENT
Start: 2025-02-17 | End: 2025-02-19

## 2025-02-17 RX ORDER — INSULIN LISPRO 100 [IU]/ML
0-16 INJECTION, SOLUTION INTRAVENOUS; SUBCUTANEOUS ONCE
Status: COMPLETED | OUTPATIENT
Start: 2025-02-17 | End: 2025-02-17

## 2025-02-17 RX ORDER — DILTIAZEM HCL 60 MG
60 TABLET ORAL ONCE
Status: COMPLETED | OUTPATIENT
Start: 2025-02-17 | End: 2025-02-17

## 2025-02-17 RX ORDER — INSULIN LISPRO 100 [IU]/ML
0-16 INJECTION, SOLUTION INTRAVENOUS; SUBCUTANEOUS
Status: DISCONTINUED | OUTPATIENT
Start: 2025-02-17 | End: 2025-02-19

## 2025-02-17 RX ORDER — INSULIN LISPRO 100 [IU]/ML
0-4 INJECTION, SOLUTION INTRAVENOUS; SUBCUTANEOUS EVERY 4 HOURS
Status: DISCONTINUED | OUTPATIENT
Start: 2025-02-17 | End: 2025-02-17

## 2025-02-17 RX ADMIN — FLUCONAZOLE 200 MG: 2 INJECTION, SOLUTION INTRAVENOUS at 10:15

## 2025-02-17 RX ADMIN — ASCORBIC ACID, VITAMIN A PALMITATE, CHOLECALCIFEROL, THIAMINE HYDROCHLORIDE, RIBOFLAVIN-5 PHOSPHATE SODIUM, PYRIDOXINE HYDROCHLORIDE, NIACINAMIDE, DEXPANTHENOL, ALPHA-TOCOPHEROL ACETATE, VITAMIN K1, FOLIC ACID, BIOTIN, CYANOCOBALAMIN: 200; 3300; 200; 6; 3.6; 6; 40; 15; 10; 150; 600; 60; 5 INJECTION, SOLUTION INTRAVENOUS at 19:55

## 2025-02-17 RX ADMIN — ENOXAPARIN SODIUM 40 MG: 100 INJECTION SUBCUTANEOUS at 10:11

## 2025-02-17 RX ADMIN — VANCOMYCIN HYDROCHLORIDE 1000 MG: 1 INJECTION, POWDER, LYOPHILIZED, FOR SOLUTION INTRAVENOUS at 06:27

## 2025-02-17 RX ADMIN — DEXMEDETOMIDINE HYDROCHLORIDE 1 MCG/KG/HR: 4 INJECTION, SOLUTION INTRAVENOUS at 12:34

## 2025-02-17 RX ADMIN — MIDAZOLAM HYDROCHLORIDE 2 MG: 1 INJECTION, SOLUTION INTRAMUSCULAR; INTRAVENOUS at 02:38

## 2025-02-17 RX ADMIN — ALBUTEROL SULFATE 2.5 MG: 2.5 SOLUTION RESPIRATORY (INHALATION) at 11:35

## 2025-02-17 RX ADMIN — DEXMEDETOMIDINE HYDROCHLORIDE 1 MCG/KG/HR: 4 INJECTION, SOLUTION INTRAVENOUS at 19:02

## 2025-02-17 RX ADMIN — DILTIAZEM HYDROCHLORIDE 60 MG: 60 TABLET ORAL at 20:39

## 2025-02-17 RX ADMIN — INSULIN LISPRO 12 UNITS: 100 INJECTION, SOLUTION INTRAVENOUS; SUBCUTANEOUS at 18:53

## 2025-02-17 RX ADMIN — DEXMEDETOMIDINE HYDROCHLORIDE 1.2 MCG/KG/HR: 4 INJECTION, SOLUTION INTRAVENOUS at 23:14

## 2025-02-17 RX ADMIN — DEXMEDETOMIDINE HYDROCHLORIDE 1.2 MCG/KG/HR: 4 INJECTION, SOLUTION INTRAVENOUS at 08:41

## 2025-02-17 RX ADMIN — THERA TABS 1 TABLET: TAB at 10:11

## 2025-02-17 RX ADMIN — CHLORDIAZEPOXIDE HYDROCHLORIDE 50 MG: 25 CAPSULE ORAL at 20:39

## 2025-02-17 RX ADMIN — GABAPENTIN 200 MG: 250 SOLUTION ORAL at 09:00

## 2025-02-17 RX ADMIN — INSULIN GLARGINE 15 UNITS: 100 INJECTION, SOLUTION SUBCUTANEOUS at 20:43

## 2025-02-17 RX ADMIN — SODIUM CHLORIDE, PRESERVATIVE FREE 10 ML: 5 INJECTION INTRAVENOUS at 20:39

## 2025-02-17 RX ADMIN — PIPERACILLIN AND TAZOBACTAM 3375 MG: 3; .375 INJECTION, POWDER, LYOPHILIZED, FOR SOLUTION INTRAVENOUS at 01:06

## 2025-02-17 RX ADMIN — INSULIN LISPRO 3 UNITS: 100 INJECTION, SOLUTION INTRAVENOUS; SUBCUTANEOUS at 12:18

## 2025-02-17 RX ADMIN — ATORVASTATIN CALCIUM 40 MG: 40 TABLET, FILM COATED ORAL at 10:11

## 2025-02-17 RX ADMIN — Medication 5000 UNITS: at 10:46

## 2025-02-17 RX ADMIN — ALBUTEROL SULFATE 2.5 MG: 2.5 SOLUTION RESPIRATORY (INHALATION) at 07:49

## 2025-02-17 RX ADMIN — INSULIN LISPRO 4 UNITS: 100 INJECTION, SOLUTION INTRAVENOUS; SUBCUTANEOUS at 20:43

## 2025-02-17 RX ADMIN — CHLORDIAZEPOXIDE HYDROCHLORIDE 50 MG: 25 CAPSULE ORAL at 15:04

## 2025-02-17 RX ADMIN — GABAPENTIN 200 MG: 250 SOLUTION ORAL at 15:04

## 2025-02-17 RX ADMIN — CHLORDIAZEPOXIDE HYDROCHLORIDE 50 MG: 25 CAPSULE ORAL at 10:46

## 2025-02-17 RX ADMIN — ZONISAMIDE 100 MG: 100 CAPSULE ORAL at 10:26

## 2025-02-17 RX ADMIN — GABAPENTIN 200 MG: 250 SOLUTION ORAL at 20:39

## 2025-02-17 RX ADMIN — SODIUM CHLORIDE, PRESERVATIVE FREE 10 ML: 5 INJECTION INTRAVENOUS at 09:00

## 2025-02-17 RX ADMIN — PROPOFOL 5 MCG/KG/MIN: 10 INJECTION, EMULSION INTRAVENOUS at 10:53

## 2025-02-17 RX ADMIN — PIPERACILLIN AND TAZOBACTAM 3375 MG: 3; .375 INJECTION, POWDER, LYOPHILIZED, FOR SOLUTION INTRAVENOUS at 10:52

## 2025-02-17 RX ADMIN — INSULIN LISPRO 2 UNITS: 100 INJECTION, SOLUTION INTRAVENOUS; SUBCUTANEOUS at 06:27

## 2025-02-17 RX ADMIN — FOLIC ACID 1000 MCG: 1 TABLET ORAL at 10:11

## 2025-02-17 RX ADMIN — MIDAZOLAM HYDROCHLORIDE 2 MG: 1 INJECTION, SOLUTION INTRAMUSCULAR; INTRAVENOUS at 05:12

## 2025-02-17 RX ADMIN — GABAPENTIN 200 MG: 250 SOLUTION ORAL at 17:33

## 2025-02-17 RX ADMIN — I.V. FAT EMULSION 250 ML: 20 EMULSION INTRAVENOUS at 19:55

## 2025-02-17 RX ADMIN — METOPROLOL TARTRATE 50 MG: 50 TABLET, FILM COATED ORAL at 10:11

## 2025-02-17 RX ADMIN — DEXMEDETOMIDINE HYDROCHLORIDE 1.2 MCG/KG/HR: 4 INJECTION, SOLUTION INTRAVENOUS at 05:05

## 2025-02-17 RX ADMIN — ALLOPURINOL 300 MG: 300 TABLET ORAL at 10:11

## 2025-02-17 RX ADMIN — ALBUTEROL SULFATE 2.5 MG: 2.5 SOLUTION RESPIRATORY (INHALATION) at 19:51

## 2025-02-17 RX ADMIN — SODIUM CHLORIDE 1000 MG: 9 INJECTION, SOLUTION INTRAVENOUS at 19:12

## 2025-02-17 RX ADMIN — PANTOPRAZOLE SODIUM 40 MG: 40 INJECTION, POWDER, LYOPHILIZED, FOR SOLUTION INTRAVENOUS at 10:10

## 2025-02-17 RX ADMIN — PIPERACILLIN AND TAZOBACTAM 3375 MG: 3; .375 INJECTION, POWDER, LYOPHILIZED, FOR SOLUTION INTRAVENOUS at 17:43

## 2025-02-17 RX ADMIN — THIAMINE HYDROCHLORIDE 200 MG: 100 INJECTION, SOLUTION INTRAMUSCULAR; INTRAVENOUS at 11:28

## 2025-02-17 RX ADMIN — ALBUTEROL SULFATE 2.5 MG: 2.5 SOLUTION RESPIRATORY (INHALATION) at 15:13

## 2025-02-17 RX ADMIN — DILTIAZEM HYDROCHLORIDE 60 MG: 60 TABLET ORAL at 10:59

## 2025-02-17 ASSESSMENT — PULMONARY FUNCTION TESTS
PIF_VALUE: 49
PIF_VALUE: 45
PIF_VALUE: 45
PIF_VALUE: 46
PIF_VALUE: 31
PIF_VALUE: 42
PIF_VALUE: 47
PIF_VALUE: 44
PIF_VALUE: 44
PIF_VALUE: 45
PIF_VALUE: 45
PIF_VALUE: 44
PIF_VALUE: 43
PIF_VALUE: 43
PIF_VALUE: 46

## 2025-02-17 NOTE — PLAN OF CARE
Problem: Skin/Tissue Integrity - Adult  Goal: Skin integrity remains intact  Description: 1.  Monitor for areas of redness and/or skin breakdown  2.  Assess vascular access sites hourly  3.  Every 4-6 hours minimum:  Change oxygen saturation probe site  4.  Every 4-6 hours:  If on nasal continuous positive airway pressure, respiratory therapy assess nares and determine need for appliance change or resting period  Outcome: Progressing     Problem: Gastrointestinal - Adult  Goal: Maintains or returns to baseline bowel function  Outcome: Progressing     Problem: Infection - Adult  Goal: Absence of infection at discharge  Outcome: Progressing     Problem: Skin/Tissue Integrity  Goal: Skin integrity remains intact  Description: 1.  Monitor for areas of redness and/or skin breakdown  2.  Assess vascular access sites hourly  3.  Every 4-6 hours minimum:  Change oxygen saturation probe site  4.  Every 4-6 hours:  If on nasal continuous positive airway pressure, respiratory therapy assess nares and determine need for appliance change or resting period  Outcome: Progressing     Problem: Safety - Medical Restraint  Goal: Remains free of injury from restraints (Restraint for Interference with Medical Device)  Description: INTERVENTIONS:  1. Determine that other, less restrictive measures have been tried or would not be effective before applying the restraint  2. Evaluate the patient's condition at the time of restraint application  3. Inform patient/family regarding the reason for restraint  4. Q2H: Monitor safety, psychosocial status, comfort, nutrition and hydration  Recent Flowsheet Documentation  Taken 2/17/2025 1600 by Xavi Jeong RN  Remains free of injury from restraints (restraint for interference with medical device): Every 2 hours: Monitor safety, psychosocial status, comfort, nutrition and hydration  Taken 2/17/2025 1400 by Xavi Jeong RN  Remains free of injury from restraints (restraint for interference

## 2025-02-17 NOTE — PROGRESS NOTES
02/16/25 2009   Patient Observation   Pulse 70   Respirations 21   SpO2 100 %   Patient Observations 7.5 ett 24 at lip   Breath Sounds   Right Upper Lobe Clear   Right Middle Lobe Clear   Right Lower Lobe Clear   Left Upper Lobe Clear   Left Lower Lobe Clear   Vent Information   Vent Mode AC/VC   Ventilator Settings   FiO2  40 %   Vt (Set, mL) 440 mL   Resp Rate (Set) 26 bpm   PEEP/CPAP (cmH2O) 5   Vent Patient Data (Readings)   Vt (Measured) 457 mL   Peak Inspiratory Pressure (cmH2O) 46 cmH2O   Rate Measured 26 br/min   Minute Volume (L/min) 11.9 Liters   Mean Airway Pressure (cmH2O) 15 cmH20   Plateau Pressure (cm H2O) 21 cm H2O   Driving Pressure 16   I:E Ratio 1:2.90   Flow Sensitivity 3 L/min   Static Compliance (L/cm H2O) 22   Dynamic Compliance (L/cm H2O) 11 L/cm H2O   Vent Alarm Settings   High Pressure (cmH2O) 50 cmH2O   Low Minute Volume (lpm) 4 L/min   Low Exhaled Vt (ml) 250 mL   RR High (bpm) 35 br/min   Apnea (secs) 20 secs   Additional Respiratoray Assessments   Humidification Source HME   Ambu Bag With Mask At Bedside Yes   Airway Clearance   Suction ET Tube   Suction Device Inline suction catheter   Sputum Method Obtained Endotracheal   Sputum Amount Scant   ETT    Placement Date/Time: 02/14/25 1135   Present on Admission/Arrival: No  Placed By: RT  Placement Verified By: Auscultation;Colorimetric ETCO2 device;Chest X-ray  Preoxygenation: Yes  Airway Tube Size: 7.5 mm  Secured At: 24 cm  Measured From: Lips   Secured At 24 cm   Measured From Lips   ETT Placement Left   Secured By Commercial tube may   Treatment   $Treatment Type $Inhaled Therapy/Meds

## 2025-02-17 NOTE — PROGRESS NOTES
RT Inhaler-Nebulizer Bronchodilator Protocol Note    There is a bronchodilator order in the chart from a provider indicating to follow the RT Bronchodilator Protocol and there is an “Initiate RT Inhaler-Nebulizer Bronchodilator Protocol” order as well (see protocol at bottom of note).    CXR Findings:  XR CHEST PORTABLE    Result Date: 2/17/2025  1.  Lines and tubes are stable. 2.  Coarse interstitial markings and interstitial opacities in the lower lungs.  Correlate for bronchiolitis or atypical infection.     XR CHEST PORTABLE    Result Date: 2/16/2025  1. No significant change.       The findings from the last RT Protocol Assessment were as follows:   History Pulmonary Disease: Chronic pulmonary disease  Respiratory Pattern: Dyspnea on exertion or RR 21-25 bpm  Breath Sounds: Slightly diminished and/or crackles  Cough: Strong, productive  Indication for Bronchodilator Therapy: Decreased or absent breath sounds  Bronchodilator Assessment Score: 7    Aerosolized bronchodilator medication orders have been revised according to the RT Inhaler-Nebulizer Bronchodilator Protocol below.    Respiratory Therapist to perform RT Therapy Protocol Assessment initially then follow the protocol.  Repeat RT Therapy Protocol Assessment PRN for score 0-3 or on second treatment, BID, and PRN for scores above 3.    No Indications - adjust the frequency to every 6 hours PRN wheezing or bronchospasm, if no treatments needed after 48 hours then discontinue using Per Protocol order mode.     If indication present, adjust the RT bronchodilator orders based on the Bronchodilator Assessment Score as indicated below.  Use Inhaler orders unless patient has one or more of the following: on home nebulizer, not able to hold breath for 10 seconds, is not alert and oriented, cannot activate and use MDI correctly, or respiratory rate 25 breaths per minute or more, then use the equivalent nebulizer order(s) with same Frequency and PRN reasons based

## 2025-02-17 NOTE — PROGRESS NOTES
Pharmacy Vancomycin Consult     Vancomycin Day: 3  Current Dosin mg q12h  Current indication: PNA    Recent Labs     25  0307 25  0500   BUN 15 17   CREATININE 0.9 0.9   WBC 5.6 3.9*       Estimated Creatinine Clearance: 83 mL/min (based on SCr of 0.9 mg/dL).    Trough: 16.1  AUC: 536  MRSA + in sputum and nares    Assessment/Plan:  Will continue current dosing as patient is therapeutic. Pharmacy will continue to monitor.      Jacqui Robin PharmD, [facility], 2025 6:02 AM

## 2025-02-17 NOTE — PROGRESS NOTES
[facility]                            Critical care Progress Note :      CC: COPD respiratory failure    Events of Last 24 hours:   TPN started  83 cc/hr   Propofol  /kg/min   Precedex    cc/hr  PEEP 5  FiO2 40%  Minimal secretions   Small BM last night       Vascular lines: IV: Left IJ     MV:   Vent Mode: AC/VC Resp Rate (Set): 26 bpm/Vt (Set, mL): 440 mL/ /FiO2 : 40 %  Recent Labs     25  1301 25  0439   PHART 7.391 7.321*   XAO1JHO 40.7 52.7*   PO2ART 79.7 111.3*       IV:   PN-Adult Premix  - Standard Electrolytes - Central Line 83 mL/hr at 25    dexmedeTOMIDine HCl in NaCl 1.2 mcg/kg/hr (25)    propofol 20 mcg/kg/min (25)    sodium chloride      dextrose         Vitals:  Blood pressure (!) 123/49, pulse 68, temperature 99.6 °F (37.6 °C), temperature source Bladder, resp. rate 26, height 1.702 m (5' 7.01\"), weight 88.6 kg (195 lb 5.2 oz), SpO2 100%.  on   Temp  Av.6 °F (37 °C)  Min: 97.9 °F (36.6 °C)  Max: 99.6 °F (37.6 °C)    Intake/Output Summary (Last 24 hours) at 2025 0837  Last data filed at 2025 0500  Gross per 24 hour   Intake 2339.58 ml   Output 1900 ml   Net 439.58 ml     EXAM:  General: ill appearing.  Intubated sedated.  Eyes: No sclera icterus. No conjunctival injection.  ENT: No discharge. ET tube in place  Neck: Trachea midline.   Resp: No accessory muscle use. + crackles. No wheezing. + rhonchi.   CV: tachy rate. Regular rhythm. No mumur or rub. No edema.   GI: Non-tender. Non-distended.   Skin: Warm and dry. No rash on exposed extremities.  M/S: No cyanosis. No clubbing.   Neuro: Intubated sedated.  Not following commands.  Psych: Noncommunicative unable to obtain        Scheduled Meds:   vancomycin  1,000 mg IntraVENous Q12H    metoprolol tartrate  50 mg Oral BID    insulin glargine  15 Units SubCUTAneous Nightly    Gabapentin  200 mg Per NG tube 4x Daily

## 2025-02-17 NOTE — PROGRESS NOTES
TPN Day 4  BG high, Lantus 15 units hs, and changed coverage to q4h  Will Reg insulin 20 units  Continue with 83ml/hr  Recent Labs     02/15/25  0342 02/15/25  0530 02/16/25  0307 02/16/25  1301 02/17/25  0439 02/17/25  0500     --  140  --   --  141   K 3.3*  --  3.4*  --   --  3.8     --  108  --   --  109   CO2 27  --  26  --   --  26   BUN 23*  --  15  --   --  17   GLUCOSE 146*  --  193*  --   --  272*   CALCIUM 6.7*  --  6.9*  --   --  7.1*   MG 1.80  --  1.75*  --   --  1.76*   PHOS 2.7  --  1.8*  --   --  2.6   TRIG 134  --   --   --   --   --    WBC 7.1  --  5.6  --   --  3.9*   PHART  --    < >  --    < > 7.321*  --     < > = values in this interval not displayed.       Sabiha Cid, [facility] 2/17/2025 12:46 PM

## 2025-02-17 NOTE — PROGRESS NOTES
are stable.      2.  Coarse interstitial markings and interstitial opacities in the lower   lungs.  Correlate for bronchiolitis or atypical infection.         XR CHEST PORTABLE   Final Result   1. No significant change.         XR CHEST PORTABLE   Final Result   Endotracheal tube, nasogastric tube, and left jugular line are stable.  NG   tube could be advanced by 5-10 cm for better positioning.      Bronchial thickening and peribronchial opacities with suspicion for   bronchiolitis..         XR CHEST PORTABLE   Final Result   Support tubes and lines as described above.      Atelectasis or infiltrate in the left lung base. Right perihilar atelectasis   or infiltrate.         XR ABDOMEN (2 VIEWS)   Final Result   No significant interval change.         CT ABDOMEN PELVIS W IV CONTRAST Additional Contrast? Oral   Final Result   1. Progressive inflammatory change of the distal and terminal ileum with   suspected inflammatory stricture and developing partial small bowel   obstruction.  Given rapid development and history, an infectious pattern of   enteritis is suspected.   2. No evidence of pneumatosis or perforation on the current exam.   3. Slight increase in size of a small fluid collection adjacent to the   terminal ileum. A small developing abscess is likely.   4. Severe bladder mucosal thickening. Correlate with urinalysis.   5. Worsening dependent airspace opacities in the lower lobes. Atelectasis or   edema may be considered. Developing pneumonitis cannot be excluded.         XR CHEST PORTABLE   Final Result   Interstitial thickening is likely due to mild pulmonary edema.         XR ABDOMEN (2 VIEWS)   Final Result   Persistent but improving ileus pattern.         XR CHEST PORTABLE   Final Result   1. Persistent mild blunting of left costophrenic angle which could be related   to pleural thickening or trace pleural effusion.  There are no acute findings   elsewhere in the chest.   2. Gastric tube in place which  and sedated on the ventilator  BP (!) 113/52   Pulse 62   Temp 97.7 °F (36.5 °C) (Bladder)   Resp 26   Ht 1.702 m (5' 7.01\")   Wt 88.6 kg (195 lb 5.2 oz)   SpO2 99%   BMI 30.59 kg/m²   Intubated and sedated  RESP: Diminished breath sounds at bases  CV: RRR  ABD: Hypoactive bowel sounds, softly distended, no pain response to palpation  SKIN: Warm and dry  Abdominal x-ray images reviewed and show coarse interstitial markings and interstitial opacities in the lower lungs  ASSESSMENT AND PLAN:  Enteritis with secondary partial small bowel obstruction and underlying metabolic encephalopathy with pulmonary disease and alcohol withdrawal. Continue antibiotics. The seem to be helping, as his leukocytosis has resolved and he is not having any more fevers. Continue TPN for nutritional support given underlying malnutrition

## 2025-02-17 NOTE — CARE COORDINATION
Patient remains on ventilator. On TPN.     Per Dr. Palm the patient may possibly need a bowel resection at some point. Per Dr. Palm the patient is going to be here for a length of time. Nothing for  to do at this time.

## 2025-02-17 NOTE — PROGRESS NOTES
Prior to receiving transfusion of blood products, patient educated on the following:    Blood product transfusion process  Benefits of receiving blood product transfusion  Risks associated with receiving the transfusion  Signs and symptoms of complications associated with blood product transfusion  Vague, uneasy feeling  Onset of pain (especially at the IV site, back, or chest)  Chills  Flushing  Fever  Nausea  Dizziness  Rash  Itching  Dark or red urine  Instructed patient to notify RN immediately if any sign or symptom occurs   Spoke with daughter Catia Gutierres, pt daughter.

## 2025-02-17 NOTE — PROGRESS NOTES
02/16/25 2000   RT Protocol   History Pulmonary Disease 2   Respiratory pattern 2   Breath sounds 2   Cough 1   Indications for Bronchodilator Therapy Decreased or absent breath sounds   Bronchodilator Assessment Score 7     RT Inhaler-Nebulizer Bronchodilator Protocol Note    There is a bronchodilator order in the chart from a provider indicating to follow the RT Bronchodilator Protocol and there is an “Initiate RT Inhaler-Nebulizer Bronchodilator Protocol” order as well (see protocol at bottom of note).    CXR Findings:  XR CHEST PORTABLE    Result Date: 2/16/2025  1. No significant change.     XR CHEST PORTABLE    Result Date: 2/15/2025  Endotracheal tube, nasogastric tube, and left jugular line are stable.  NG tube could be advanced by 5-10 cm for better positioning. Bronchial thickening and peribronchial opacities with suspicion for bronchiolitis..       The findings from the last RT Protocol Assessment were as follows:   History Pulmonary Disease: Chronic pulmonary disease  Respiratory Pattern: Dyspnea on exertion or RR 21-25 bpm  Breath Sounds: Slightly diminished and/or crackles  Cough: Strong, productive  Indication for Bronchodilator Therapy: Decreased or absent breath sounds  Bronchodilator Assessment Score: 7    Aerosolized bronchodilator medication orders have been revised according to the RT Inhaler-Nebulizer Bronchodilator Protocol below.    Respiratory Therapist to perform RT Therapy Protocol Assessment initially then follow the protocol.  Repeat RT Therapy Protocol Assessment PRN for score 0-3 or on second treatment, BID, and PRN for scores above 3.    No Indications - adjust the frequency to every 6 hours PRN wheezing or bronchospasm, if no treatments needed after 48 hours then discontinue using Per Protocol order mode.     If indication present, adjust the RT bronchodilator orders based on the Bronchodilator Assessment Score as indicated below.  Use Inhaler orders unless patient has one or more

## 2025-02-17 NOTE — PROGRESS NOTES
02/17/25 0253   Patient Observation   Pulse 69   Respirations 26   SpO2 100 %   Patient Observations 7.5 ett 24 at lip   Breath Sounds   Right Upper Lobe Clear   Right Middle Lobe Clear   Right Lower Lobe Clear   Left Upper Lobe Clear   Left Lower Lobe Clear   Vent Information   Vent Mode AC/VC   Ventilator Settings   FiO2  40 %   Vt (Set, mL) 440 mL   Resp Rate (Set) 26 bpm   PEEP/CPAP (cmH2O) 5   Vent Patient Data (Readings)   Vt (Measured) 453 mL   Peak Inspiratory Pressure (cmH2O) 46 cmH2O   Rate Measured 26 br/min   Minute Volume (L/min) 11.8 Liters   Mean Airway Pressure (cmH2O) 14 cmH20   Plateau Pressure (cm H2O) 25 cm H2O   Driving Pressure 20   I:E Ratio 1:2.90   Flow Sensitivity 3 L/min   Vent Alarm Settings   High Pressure (cmH2O) 50 cmH2O   Low Minute Volume (lpm) 4 L/min   Low Exhaled Vt (ml) 250 mL   RR High (bpm) 35 br/min   Apnea (secs) 20 secs   Additional Respiratoray Assessments   Humidification Source HME   Ambu Bag With Mask At Bedside Yes   Airway Clearance   Suction ET Tube   Suction Device Inline suction catheter   Sputum Method Obtained Endotracheal   Sputum Amount Small   Sputum Color/Odor White;Yellow   Sputum Consistency Thick   ETT    Placement Date/Time: 02/14/25 1135   Present on Admission/Arrival: No  Placed By: RT  Placement Verified By: Auscultation;Colorimetric ETCO2 device;Chest X-ray  Preoxygenation: Yes  Airway Tube Size: 7.5 mm  Secured At: 24 cm  Measured From: Lips   Secured At 24 cm   Measured From Lips   ETT Placement Right   Secured By Commercial tube may

## 2025-02-17 NOTE — PROGRESS NOTES
02/16/25 2322   Patient Observation   Pulse 67   Respirations 26   SpO2 100 %   Patient Observations 7.5 ett 24 at lip   Breath Sounds   Right Upper Lobe Clear;Diminished   Right Middle Lobe Clear;Diminished   Right Lower Lobe Clear;Diminished   Left Upper Lobe Clear;Diminished   Left Lower Lobe Clear;Diminished   Vent Information   Vent Mode AC/VC   Ventilator Settings   FiO2  40 %   Vt (Set, mL) 440 mL   Resp Rate (Set) 26 bpm   PEEP/CPAP (cmH2O) 5   Vent Patient Data (Readings)   Vt (Measured) 453 mL   Peak Inspiratory Pressure (cmH2O) 43 cmH2O   Rate Measured 26 br/min   Minute Volume (L/min) 11.8 Liters   Mean Airway Pressure (cmH2O) 14 cmH20   Plateau Pressure (cm H2O) 25 cm H2O   Driving Pressure 20   I:E Ratio 1:2.90   Flow Sensitivity 3 L/min   Vent Alarm Settings   High Pressure (cmH2O) 50 cmH2O   Low Minute Volume (lpm) 4 L/min   Low Exhaled Vt (ml) 250 mL   RR High (bpm) 35 br/min   Apnea (secs) 20 secs   Additional Respiratoray Assessments   Humidification Source HME   Ambu Bag With Mask At Bedside Yes   Airway Clearance   Suction ET Tube   Suction Device Inline suction catheter   Sputum Method Obtained Endotracheal   Sputum Amount Small   Sputum Color/Odor White;Yellow   Sputum Consistency Thick   ETT    Placement Date/Time: 02/14/25 1135   Present on Admission/Arrival: No  Placed By: RT  Placement Verified By: Auscultation;Colorimetric ETCO2 device;Chest X-ray  Preoxygenation: Yes  Airway Tube Size: 7.5 mm  Secured At: 24 cm  Measured From: Lips   Secured At 24 cm   Measured From Lips   ETT Placement Center   Secured By Commercial tube may

## 2025-02-17 NOTE — PROGRESS NOTES
Progress Note    Admit Date:  1/30/2025    Interval history:  Influenza A and Acute respiratory failure   Was on bipap in iCU and improved, transferred to [facility]        Has NG placed for Ileus  , he pulled out NG and likely has aspirated became hypoxic and transferred to ICU      Seizure like event 2/7,   Remains confused  MRI brain incomplete .     2/13   Mentation worse today  On precedex   On 4 L of O2     2/17  Remains on vent support  No BM  Sputum with MRSA   Remains On TPN     Subjective:  Mr. Early seen sedated on vent no distress  No fevers. BP stable   Hb downtrending    Objective:   Patient Vitals for the past 4 hrs:   BP Temp Temp src Pulse Resp SpO2   02/17/25 0700 (!) 123/55 -- -- 68 26 100 %   02/17/25 0600 (!) 111/50 -- -- 66 26 100 %   02/17/25 0500 (!) 109/54 -- -- 69 26 100 %   02/17/25 0401 (!) 108/51 99.5 °F (37.5 °C) Bladder 69 26 100 %          Intake/Output Summary (Last 24 hours) at 2/17/2025 0728  Last data filed at 2/17/2025 0500  Gross per 24 hour   Intake 2849.86 ml   Output 2150 ml   Net 699.86 ml       Physical Exam:        General:  elderly male sedated on vent  Oral ETT and OG noted  Left IJ TLC . Appears to be not in any distress  Mucous Membranes:  Pink , anicteric  Neck: No JVD, no carotid bruit, no thyromegaly  Chest:  Clear to auscultation bilaterally, resolved wheeze  Cardiovascular:  RRR S1S2 heard, no murmurs or gallops  Abdomen:  Soft, obese +distended, non tender, no organomegaly, BS absent  Extremities: trace edema to ext improved   Distal pulses well felt  Neurological : sedated        Medications:  vancomycin, 1,000 mg, Q12H  metoprolol tartrate, 50 mg, BID  insulin glargine, 15 Units, Nightly  Gabapentin, 200 mg, 4x Daily  albuterol, 2.5 mg, 4x Daily RT  fluconazole, 200 mg, Q24H  fat emulsion, 250 mL, Once per day on Monday Thursday  sodium chloride flush, 5-40 mL, 2 times per day  piperacillin-tazobactam, 3,375 mg, Q8H  zonisamide, 100 mg, Daily  thiamine (B-1) 200

## 2025-02-17 NOTE — PLAN OF CARE
Problem: Metabolic/Fluid and Electrolytes - Adult  Goal: Electrolytes maintained within normal limits  Outcome: Progressing  Flowsheets (Taken 2/16/2025 2000)  Electrolytes maintained within normal limits:   Monitor labs and assess patient for signs and symptoms of electrolyte imbalances   Administer electrolyte replacement as ordered  Goal: Glucose maintained within prescribed range  Outcome: Progressing  Flowsheets (Taken 2/16/2025 2000)  Glucose maintained within prescribed range: Monitor blood glucose as ordered     Problem: Infection - Adult  Goal: Absence of infection at discharge  Outcome: Progressing  Flowsheets (Taken 2/16/2025 2000)  Absence of infection at discharge:   Assess and monitor for signs and symptoms of infection   Monitor lab/diagnostic results   Monitor all insertion sites i.e., indwelling lines, tubes and drains   Monitor endotracheal (as able) and nasal secretions for changes in amount and color     Problem: Respiratory - Adult  Goal: Achieves optimal ventilation and oxygenation  Outcome: Progressing  Flowsheets (Taken 2/16/2025 2000)  Achieves optimal ventilation and oxygenation:   Assess for changes in respiratory status   Assess for changes in mentation and behavior   Position to facilitate oxygenation and minimize respiratory effort   Oxygen supplementation based on oxygen saturation or arterial blood gases

## 2025-02-17 NOTE — PROGRESS NOTES
INPATIENT PROGRESS NOTE        IDENTIFYING DATA/REASON FOR CONSULTATION   PATIENT:  Sandor Early  MRN:  [id number]  ADMIT DATE: 1/30/2025  TIME OF EVALUATION: 2/17/2025 9:34 AM  HOSPITAL STAY:   LOS: 18 days   CONSULTING PHYSICIAN: Drake Jalloh*   REASON FOR CONSULTATION: abdominal distention, small bowel obstruction, worsening inflammatory changes involving the ileum     Subjective:    Patient seen in follow-up.  Regarding small bowel obstruction patient is sedated.    MEDICATIONS   SCHEDULED:  vancomycin, 1,000 mg, Q12H  metoprolol tartrate, 50 mg, BID  insulin glargine, 15 Units, Nightly  Gabapentin, 200 mg, 4x Daily  albuterol, 2.5 mg, 4x Daily RT  fluconazole, 200 mg, Q24H  fat emulsion, 250 mL, Once per day on Monday Thursday  sodium chloride flush, 5-40 mL, 2 times per day  piperacillin-tazobactam, 3,375 mg, Q8H  zonisamide, 100 mg, Daily  thiamine (B-1) 200 mg in sodium chloride 0.9 % 100 mL IVPB, 200 mg, Q24H  multivitamin, 1 tablet, Daily  pantoprazole (PROTONIX) 40 mg in sodium chloride (PF) 0.9 % 10 mL injection, 40 mg, Daily  enoxaparin, 40 mg, Daily  insulin lispro, 0-4 Units, 4x Daily AC & HS  dilTIAZem, 180 mg, Daily  allopurinol, 300 mg, Daily  vitamin D3, 5,000 Units, Daily  [Held by provider] furosemide, 20 mg, Daily  folic acid, 1,000 mcg, Daily  [Held by provider] DULoxetine, 60 mg, Daily  atorvastatin, 40 mg, Daily      FLUIDS/DRIPS:     PN-Adult Premix 5/20 - Standard Electrolytes - Central Line 83 mL/hr at 02/16/25 1851    dexmedeTOMIDine HCl in NaCl 1.2 mcg/kg/hr (02/17/25 0841)    propofol 20 mcg/kg/min (02/16/25 2058)    sodium chloride      dextrose       PRNs: midazolam, 2 mg, Q1H PRN  fentanNYL, 50 mcg, Q2H PRN  sodium chloride flush, 5-40 mL, PRN  diatrizoate meglumine-sodium, 12 mL, ONCE PRN  guaiFENesin, 200 mg, Q4H PRN  benzocaine, , 4x Daily PRN  ALPRAZolam, 0.5 mg, ONCE PRN  phenol, 1 spray, Q2H PRN  sodium chloride, , PRN  ondansetron, 4 mg, Q8H PRN    Or  ondansetron, 4 mg, Q6H PRN  acetaminophen, 650 mg, Q6H PRN   Or  acetaminophen, 650 mg, Q6H PRN  benzonatate, 100 mg, TID PRN  dextrose bolus, 125 mL, PRN   Or  dextrose bolus, 250 mL, PRN  glucagon (rDNA), 1 mg, PRN  dextrose, , Continuous PRN  potassium chloride, 20 mEq, PRN   Or  potassium chloride, 10 mEq, PRN  magnesium sulfate, 2,000 mg, PRN  calcium carbonate, 500 mg, TID PRN  oxyCODONE HCl, 10 mg, Q8H PRN  glucose, 4 tablet, PRN      ALLERGIES:    Allergies   Allergen Reactions    Lisinopril Swelling and Other (See Comments)    Ace Inhibitors Swelling and Other (See Comments)    Influenza Vaccines Other (See Comments)     He states he was hospitalized when he received his first flu vaccine    Tiotropium Bromide Monohydrate Swelling and Other (See Comments)     Tolerates both tudorza and incruse  On Trelegy.    Vilanterol          PHYSICAL EXAM   VITALS:  BP (!) 123/49   Pulse 68   Temp 99.6 °F (37.6 °C) (Bladder)   Resp 26   Ht 1.702 m (5' 7.01\")   Wt 88.6 kg (195 lb 5.2 oz)   SpO2 100%   BMI 30.59 kg/m²   TEMPERATURE:  Current - Temp: 99.6 °F (37.6 °C); Max - Temp  Av.6 °F (37 °C)  Min: 97.9 °F (36.6 °C)  Max: 99.6 °F (37.6 °C)    Physical Exam:  General appearance: Intubated eyes: Anicteric  Head: Normocephalic, without obvious abnormality  Lungs: clear to auscultation bilaterally, Normal Effort  Heart: regular rate and rhythm, normal S1 and S2, no murmurs or rubs  Abdomen: soft, non-distended, non-tender. Bowel sounds normal. No masses,  no organomegaly.   Extremities: atraumatic, no cyanosis or edema  Skin: warm and dry, no jaundice  Neuro: Grossly intact, A&OX3    LABS AND IMAGING   Laboratory   Recent Labs     02/15/25  0342 25  0307 25  0500   WBC 7.1 5.6 3.9*   HGB 7.3* 7.4* 7.0*   HCT 22.0* 22.8* 21.5*   MCV 91.9 93.8 93.7    259 220     Recent Labs     02/15/25  0342 25  0307 25  0500    140 141   K 3.3* 3.4* 3.8    108 109   CO2 27 26 26

## 2025-02-18 ENCOUNTER — APPOINTMENT (OUTPATIENT)
Dept: GENERAL RADIOLOGY | Age: 70
DRG: 207 | End: 2025-02-18
Payer: MEDICARE

## 2025-02-18 PROBLEM — E43 SEVERE PROTEIN-CALORIE MALNUTRITION: Status: ACTIVE | Noted: 2018-02-19

## 2025-02-18 LAB
ANION GAP SERPL CALCULATED.3IONS-SCNC: 5 MMOL/L (ref 3–16)
ANISOCYTOSIS BLD QL SMEAR: ABNORMAL
BASE EXCESS BLDA CALC-SCNC: 1 MMOL/L (ref -3–3)
BASE EXCESS BLDA CALC-SCNC: 1.8 MMOL/L (ref -3–3)
BASOPHILS # BLD: 0 K/UL (ref 0–0.2)
BASOPHILS NFR BLD: 0 %
BUN SERPL-MCNC: 16 MG/DL (ref 7–20)
CALCIUM SERPL-MCNC: 7.1 MG/DL (ref 8.3–10.6)
CHLORIDE SERPL-SCNC: 107 MMOL/L (ref 99–110)
CO2 BLDA-SCNC: 29 MMOL/L
CO2 BLDA-SCNC: 31.1 MMOL/L
CO2 SERPL-SCNC: 26 MMOL/L (ref 21–32)
COHGB MFR BLDA: 0.3 % (ref 0–1.5)
COHGB MFR BLDA: 0.3 % (ref 0–1.5)
CREAT SERPL-MCNC: 0.8 MG/DL (ref 0.8–1.3)
DACRYOCYTES BLD QL SMEAR: ABNORMAL
DEPRECATED RDW RBC AUTO: 16.6 % (ref 12.4–15.4)
EOSINOPHIL # BLD: 0 K/UL (ref 0–0.6)
EOSINOPHIL NFR BLD: 0 %
GFR SERPLBLD CREATININE-BSD FMLA CKD-EPI: >90 ML/MIN/{1.73_M2}
GLUCOSE BLD-MCNC: 270 MG/DL (ref 70–99)
GLUCOSE BLD-MCNC: 299 MG/DL (ref 70–99)
GLUCOSE BLD-MCNC: 316 MG/DL (ref 70–99)
GLUCOSE BLD-MCNC: 317 MG/DL (ref 70–99)
GLUCOSE BLD-MCNC: 330 MG/DL (ref 70–99)
GLUCOSE SERPL-MCNC: 283 MG/DL (ref 70–99)
HCO3 BLDA-SCNC: 27.3 MMOL/L (ref 21–29)
HCO3 BLDA-SCNC: 29.2 MMOL/L (ref 21–29)
HCT VFR BLD AUTO: 24 % (ref 40.5–52.5)
HGB BLD-MCNC: 7.9 G/DL (ref 13.5–17.5)
HGB BLDA-MCNC: 10.3 G/DL (ref 13.5–17.5)
HGB BLDA-MCNC: 8.5 G/DL (ref 13.5–17.5)
LYMPHOCYTES # BLD: 0.6 K/UL (ref 1–5.1)
LYMPHOCYTES NFR BLD: 22 %
MAGNESIUM SERPL-MCNC: 1.62 MG/DL (ref 1.8–2.4)
MCH RBC QN AUTO: 31.1 PG (ref 26–34)
MCHC RBC AUTO-ENTMCNC: 32.8 G/DL (ref 31–36)
MCV RBC AUTO: 94.6 FL (ref 80–100)
METHGB MFR BLDA: 0.1 %
METHGB MFR BLDA: 0.3 %
MONOCYTES # BLD: 0.1 K/UL (ref 0–1.3)
MONOCYTES NFR BLD: 5 %
MYELOCYTES NFR BLD MANUAL: 1 %
NEUTROPHILS # BLD: 2.1 K/UL (ref 1.7–7.7)
NEUTROPHILS NFR BLD: 66 %
NEUTS BAND NFR BLD MANUAL: 6 % (ref 0–7)
NRBC BLD-RTO: 2 /100 WBC
O2 THERAPY: ABNORMAL
O2 THERAPY: ABNORMAL
OVALOCYTES BLD QL SMEAR: ABNORMAL
PCO2 BLDA: 53.3 MMHG (ref 35–45)
PCO2 BLDA: 61 MMHG (ref 35–45)
PERFORMED ON: ABNORMAL
PH BLDA: 7.3 [PH] (ref 7.35–7.45)
PH BLDA: 7.33 [PH] (ref 7.35–7.45)
PHOSPHATE SERPL-MCNC: 2.5 MG/DL (ref 2.5–4.9)
PLATELET # BLD AUTO: 235 K/UL (ref 135–450)
PLATELET BLD QL SMEAR: ADEQUATE
PMV BLD AUTO: 8.4 FL (ref 5–10.5)
PO2 BLDA: 100.8 MMHG (ref 75–108)
PO2 BLDA: 98.3 MMHG (ref 75–108)
POIKILOCYTOSIS BLD QL SMEAR: ABNORMAL
POLYCHROMASIA BLD QL SMEAR: ABNORMAL
POTASSIUM SERPL-SCNC: 3.9 MMOL/L (ref 3.5–5.1)
RBC # BLD AUTO: 2.53 M/UL (ref 4.2–5.9)
SAO2 % BLDA: 96.8 %
SAO2 % BLDA: 96.9 %
SLIDE REVIEW: ABNORMAL
SODIUM SERPL-SCNC: 138 MMOL/L (ref 136–145)
WBC # BLD AUTO: 2.9 K/UL (ref 4–11)

## 2025-02-18 PROCEDURE — 2500000003 HC RX 250 WO HCPCS: Performed by: INTERNAL MEDICINE

## 2025-02-18 PROCEDURE — 82803 BLOOD GASES ANY COMBINATION: CPT

## 2025-02-18 PROCEDURE — 6370000000 HC RX 637 (ALT 250 FOR IP): Performed by: SURGERY

## 2025-02-18 PROCEDURE — 2580000003 HC RX 258: Performed by: INTERNAL MEDICINE

## 2025-02-18 PROCEDURE — 99291 CRITICAL CARE FIRST HOUR: CPT | Performed by: INTERNAL MEDICINE

## 2025-02-18 PROCEDURE — 6360000002 HC RX W HCPCS: Performed by: INTERNAL MEDICINE

## 2025-02-18 PROCEDURE — 94640 AIRWAY INHALATION TREATMENT: CPT

## 2025-02-18 PROCEDURE — 2000000000 HC ICU R&B

## 2025-02-18 PROCEDURE — 99233 SBSQ HOSP IP/OBS HIGH 50: CPT | Performed by: SURGERY

## 2025-02-18 PROCEDURE — APPSS15 APP SPLIT SHARED TIME 0-15 MINUTES

## 2025-02-18 PROCEDURE — 99233 SBSQ HOSP IP/OBS HIGH 50: CPT | Performed by: INTERNAL MEDICINE

## 2025-02-18 PROCEDURE — 2500000003 HC RX 250 WO HCPCS: Performed by: STUDENT IN AN ORGANIZED HEALTH CARE EDUCATION/TRAINING PROGRAM

## 2025-02-18 PROCEDURE — 94761 N-INVAS EAR/PLS OXIMETRY MLT: CPT

## 2025-02-18 PROCEDURE — 80048 BASIC METABOLIC PNL TOTAL CA: CPT

## 2025-02-18 PROCEDURE — 6370000000 HC RX 637 (ALT 250 FOR IP): Performed by: INTERNAL MEDICINE

## 2025-02-18 PROCEDURE — 2500000003 HC RX 250 WO HCPCS: Performed by: SURGERY

## 2025-02-18 PROCEDURE — 85025 COMPLETE CBC W/AUTO DIFF WBC: CPT

## 2025-02-18 PROCEDURE — 2700000000 HC OXYGEN THERAPY PER DAY

## 2025-02-18 PROCEDURE — 71045 X-RAY EXAM CHEST 1 VIEW: CPT

## 2025-02-18 PROCEDURE — 94003 VENT MGMT INPAT SUBQ DAY: CPT

## 2025-02-18 PROCEDURE — 83735 ASSAY OF MAGNESIUM: CPT

## 2025-02-18 PROCEDURE — 84100 ASSAY OF PHOSPHORUS: CPT

## 2025-02-18 PROCEDURE — 6370000000 HC RX 637 (ALT 250 FOR IP): Performed by: PSYCHIATRY & NEUROLOGY

## 2025-02-18 RX ORDER — INSULIN GLARGINE 100 [IU]/ML
15 INJECTION, SOLUTION SUBCUTANEOUS 2 TIMES DAILY
Status: DISCONTINUED | OUTPATIENT
Start: 2025-02-18 | End: 2025-02-20

## 2025-02-18 RX ORDER — MAGNESIUM SULFATE IN WATER 40 MG/ML
2000 INJECTION, SOLUTION INTRAVENOUS ONCE
Status: DISCONTINUED | OUTPATIENT
Start: 2025-02-18 | End: 2025-02-18

## 2025-02-18 RX ADMIN — MIDAZOLAM HYDROCHLORIDE 2 MG: 1 INJECTION, SOLUTION INTRAMUSCULAR; INTRAVENOUS at 01:58

## 2025-02-18 RX ADMIN — ZONISAMIDE 100 MG: 100 CAPSULE ORAL at 09:34

## 2025-02-18 RX ADMIN — PIPERACILLIN AND TAZOBACTAM 3375 MG: 3; .375 INJECTION, POWDER, LYOPHILIZED, FOR SOLUTION INTRAVENOUS at 09:35

## 2025-02-18 RX ADMIN — ALBUTEROL SULFATE 2.5 MG: 2.5 SOLUTION RESPIRATORY (INHALATION) at 11:58

## 2025-02-18 RX ADMIN — PIPERACILLIN AND TAZOBACTAM 3375 MG: 3; .375 INJECTION, POWDER, LYOPHILIZED, FOR SOLUTION INTRAVENOUS at 01:42

## 2025-02-18 RX ADMIN — DILTIAZEM HYDROCHLORIDE 60 MG: 60 TABLET ORAL at 05:51

## 2025-02-18 RX ADMIN — ENOXAPARIN SODIUM 40 MG: 100 INJECTION SUBCUTANEOUS at 09:23

## 2025-02-18 RX ADMIN — DEXMEDETOMIDINE HYDROCHLORIDE 1.2 MCG/KG/HR: 4 INJECTION, SOLUTION INTRAVENOUS at 06:13

## 2025-02-18 RX ADMIN — ALBUTEROL SULFATE 2.5 MG: 2.5 SOLUTION RESPIRATORY (INHALATION) at 08:04

## 2025-02-18 RX ADMIN — ALBUTEROL SULFATE 2.5 MG: 2.5 SOLUTION RESPIRATORY (INHALATION) at 19:53

## 2025-02-18 RX ADMIN — ASCORBIC ACID, VITAMIN A PALMITATE, CHOLECALCIFEROL, THIAMINE HYDROCHLORIDE, RIBOFLAVIN-5 PHOSPHATE SODIUM, PYRIDOXINE HYDROCHLORIDE, NIACINAMIDE, DEXPANTHENOL, ALPHA-TOCOPHEROL ACETATE, VITAMIN K1, FOLIC ACID, BIOTIN, CYANOCOBALAMIN: 200; 3300; 200; 6; 3.6; 6; 40; 15; 10; 150; 600; 60; 5 INJECTION, SOLUTION INTRAVENOUS at 19:07

## 2025-02-18 RX ADMIN — INSULIN LISPRO 12 UNITS: 100 INJECTION, SOLUTION INTRAVENOUS; SUBCUTANEOUS at 12:14

## 2025-02-18 RX ADMIN — ALBUTEROL SULFATE 2.5 MG: 2.5 SOLUTION RESPIRATORY (INHALATION) at 16:10

## 2025-02-18 RX ADMIN — SODIUM CHLORIDE 1000 MG: 9 INJECTION, SOLUTION INTRAVENOUS at 18:03

## 2025-02-18 RX ADMIN — INSULIN GLARGINE 15 UNITS: 100 INJECTION, SOLUTION SUBCUTANEOUS at 20:38

## 2025-02-18 RX ADMIN — SODIUM CHLORIDE, PRESERVATIVE FREE 10 ML: 5 INJECTION INTRAVENOUS at 09:24

## 2025-02-18 RX ADMIN — INSULIN LISPRO 12 UNITS: 100 INJECTION, SOLUTION INTRAVENOUS; SUBCUTANEOUS at 20:38

## 2025-02-18 RX ADMIN — THERA TABS 1 TABLET: TAB at 09:23

## 2025-02-18 RX ADMIN — FLUCONAZOLE 200 MG: 2 INJECTION, SOLUTION INTRAVENOUS at 09:34

## 2025-02-18 RX ADMIN — PANTOPRAZOLE SODIUM 40 MG: 40 INJECTION, POWDER, LYOPHILIZED, FOR SOLUTION INTRAVENOUS at 09:24

## 2025-02-18 RX ADMIN — GABAPENTIN 200 MG: 250 SOLUTION ORAL at 13:49

## 2025-02-18 RX ADMIN — DEXMEDETOMIDINE HYDROCHLORIDE 1.2 MCG/KG/HR: 4 INJECTION, SOLUTION INTRAVENOUS at 09:53

## 2025-02-18 RX ADMIN — MAGNESIUM SULFATE HEPTAHYDRATE 2000 MG: 40 INJECTION, SOLUTION INTRAVENOUS at 05:52

## 2025-02-18 RX ADMIN — SODIUM CHLORIDE 1000 MG: 9 INJECTION, SOLUTION INTRAVENOUS at 06:15

## 2025-02-18 RX ADMIN — INSULIN GLARGINE 15 UNITS: 100 INJECTION, SOLUTION SUBCUTANEOUS at 12:13

## 2025-02-18 RX ADMIN — FOLIC ACID 1000 MCG: 1 TABLET ORAL at 09:23

## 2025-02-18 RX ADMIN — Medication 5000 UNITS: at 09:23

## 2025-02-18 RX ADMIN — GABAPENTIN 200 MG: 250 SOLUTION ORAL at 09:23

## 2025-02-18 RX ADMIN — ALLOPURINOL 300 MG: 300 TABLET ORAL at 09:23

## 2025-02-18 RX ADMIN — DEXMEDETOMIDINE HYDROCHLORIDE 1.2 MCG/KG/HR: 4 INJECTION, SOLUTION INTRAVENOUS at 03:01

## 2025-02-18 RX ADMIN — CHLORDIAZEPOXIDE HYDROCHLORIDE 50 MG: 25 CAPSULE ORAL at 09:23

## 2025-02-18 RX ADMIN — THIAMINE HYDROCHLORIDE 200 MG: 100 INJECTION, SOLUTION INTRAMUSCULAR; INTRAVENOUS at 12:20

## 2025-02-18 RX ADMIN — PIPERACILLIN AND TAZOBACTAM 3375 MG: 3; .375 INJECTION, POWDER, LYOPHILIZED, FOR SOLUTION INTRAVENOUS at 18:06

## 2025-02-18 RX ADMIN — DEXMEDETOMIDINE HYDROCHLORIDE 1.2 MCG/KG/HR: 4 INJECTION, SOLUTION INTRAVENOUS at 13:47

## 2025-02-18 RX ADMIN — SODIUM CHLORIDE, PRESERVATIVE FREE 10 ML: 5 INJECTION INTRAVENOUS at 20:39

## 2025-02-18 RX ADMIN — DEXMEDETOMIDINE HYDROCHLORIDE 1.2 MCG/KG/HR: 4 INJECTION, SOLUTION INTRAVENOUS at 17:27

## 2025-02-18 RX ADMIN — INSULIN LISPRO 8 UNITS: 100 INJECTION, SOLUTION INTRAVENOUS; SUBCUTANEOUS at 18:20

## 2025-02-18 RX ADMIN — ATORVASTATIN CALCIUM 40 MG: 40 TABLET, FILM COATED ORAL at 09:23

## 2025-02-18 RX ADMIN — INSULIN LISPRO 12 UNITS: 100 INJECTION, SOLUTION INTRAVENOUS; SUBCUTANEOUS at 09:22

## 2025-02-18 RX ADMIN — CHLORDIAZEPOXIDE HYDROCHLORIDE 50 MG: 25 CAPSULE ORAL at 13:49

## 2025-02-18 RX ADMIN — DEXMEDETOMIDINE HYDROCHLORIDE 1.2 MCG/KG/HR: 4 INJECTION, SOLUTION INTRAVENOUS at 21:22

## 2025-02-18 ASSESSMENT — PULMONARY FUNCTION TESTS
PIF_VALUE: 42
PIF_VALUE: 40
PIF_VALUE: 40
PIF_VALUE: 45
PIF_VALUE: 43
PIF_VALUE: 39
PIF_VALUE: 43
PIF_VALUE: 42
PIF_VALUE: 43
PIF_VALUE: 38
PIF_VALUE: 40
PIF_VALUE: 37
PIF_VALUE: 41
PIF_VALUE: 49

## 2025-02-18 NOTE — PROGRESS NOTES
Progress Note    Admit Date:  1/30/2025    Interval history:  Influenza A and Acute respiratory failure   Was on bipap in iCU and improved, transferred to [facility]        Has NG placed for Ileus  , he pulled out NG and likely has aspirated became hypoxic and transferred to ICU      Seizure like event 2/7,   Remains confused  MRI brain incomplete .     2/13   Mentation worse today  On precedex   On 4 L of O2     2/18  Remains on vent support  No BM , abd remains distended   Sputum with MRSA , added vanc, no fevers  S.p  1 unit PRBC  Remains On TPN     Subjective:  Mr. Early seen sedated on vent no distress  No fevers. BP stable       Objective:   Patient Vitals for the past 4 hrs:   BP Temp Temp src Pulse Resp SpO2   02/18/25 0700 (!) 109/51 -- -- 58 26 99 %   02/18/25 0600 (!) 118/45 -- -- 60 26 100 %   02/18/25 0500 (!) 117/51 -- -- 61 26 99 %   02/18/25 0400 (!) 121/46 97.8 °F (36.6 °C) Bladder 60 26 99 %          Intake/Output Summary (Last 24 hours) at 2/18/2025 0719  Last data filed at 2/18/2025 0600  Gross per 24 hour   Intake 5628.05 ml   Output 1550 ml   Net 4078.05 ml       Physical Exam:        General:  elderly male sedated on vent  Oral ETT and OG noted  Left IJ TLC . Appears to be not in any distress  Mucous Membranes:  Pink , anicteric  Neck: No JVD, no carotid bruit, no thyromegaly  Chest:  Clear to auscultation bilaterally, resolved wheeze  Cardiovascular:  RRR S1S2 heard, no murmurs or gallops  Abdomen:  Soft, obese +distended, non tender, no organomegaly, BS absent  Extremities: trace edema to ext improved   Distal pulses well felt  Neurological : sedated        Medications:  dilTIAZem, 60 mg, 3 times per day  chlordiazePOXIDE, 50 mg, TID  vancomycin, 1,000 mg, Q12H  insulin lispro, 0-16 Units, 4x Daily AC & HS  metoprolol tartrate, 50 mg, BID  insulin glargine, 15 Units, Nightly  Gabapentin, 200 mg, 4x Daily  albuterol, 2.5 mg, 4x Daily RT  fluconazole, 200 mg, Q24H  fat emulsion, 250 mL, Once per  day on Monday Thursday  sodium chloride flush, 5-40 mL, 2 times per day  piperacillin-tazobactam, 3,375 mg, Q8H  zonisamide, 100 mg, Daily  thiamine (B-1) 200 mg in sodium chloride 0.9 % 100 mL IVPB, 200 mg, Q24H  multivitamin, 1 tablet, Daily  pantoprazole (PROTONIX) 40 mg in sodium chloride (PF) 0.9 % 10 mL injection, 40 mg, Daily  enoxaparin, 40 mg, Daily  allopurinol, 300 mg, Daily  vitamin D3, 5,000 Units, Daily  [Held by provider] furosemide, 20 mg, Daily  folic acid, 1,000 mcg, Daily  [Held by provider] DULoxetine, 60 mg, Daily  atorvastatin, 40 mg, Daily      PRN Medications:  sodium chloride, , PRN  midazolam, 2 mg, Q1H PRN  fentanNYL, 50 mcg, Q2H PRN  sodium chloride flush, 5-40 mL, PRN  diatrizoate meglumine-sodium, 12 mL, ONCE PRN  guaiFENesin, 200 mg, Q4H PRN  benzocaine, , 4x Daily PRN  ALPRAZolam, 0.5 mg, ONCE PRN  phenol, 1 spray, Q2H PRN  sodium chloride, , PRN  ondansetron, 4 mg, Q8H PRN   Or  ondansetron, 4 mg, Q6H PRN  acetaminophen, 650 mg, Q6H PRN   Or  acetaminophen, 650 mg, Q6H PRN  benzonatate, 100 mg, TID PRN  dextrose bolus, 125 mL, PRN   Or  dextrose bolus, 250 mL, PRN  glucagon (rDNA), 1 mg, PRN  dextrose, , Continuous PRN  potassium chloride, 20 mEq, PRN   Or  potassium chloride, 10 mEq, PRN  magnesium sulfate, 2,000 mg, PRN  calcium carbonate, 500 mg, TID PRN  oxyCODONE HCl, 10 mg, Q8H PRN  glucose, 4 tablet, PRN          Data:  CBC:   Recent Labs     02/16/25  0307 02/17/25  0500 02/17/25  1845 02/18/25  0432   WBC 5.6 3.9*  --  2.9*   HGB 7.4* 7.0* 8.2* 7.9*   HCT 22.8* 21.5* 25.6* 24.0*   MCV 93.8 93.7  --  94.6    220  --  235     BMP:   Recent Labs     02/16/25  0307 02/17/25  0500 02/18/25  0432    141 138   K 3.4* 3.8 3.9    109 107   CO2 26 26 26   PHOS 1.8* 2.6 2.5   BUN 15 17 16   CREATININE 0.9 0.9 0.8     LIVER PROFILE:   Recent Labs     02/16/25  0307 02/17/25  0500   AST 21 20   ALT 7* 8*   BILIDIR 0.3 0.2   BILITOT 0.4 0.3   ALKPHOS 126 138*

## 2025-02-18 NOTE — PROGRESS NOTES
Dr. Wei updated. Hold all PO meds for now.   Mod amount of brown drainage noted from NG.  Surgery updated. Will continue with continuous Low wall suction.

## 2025-02-18 NOTE — PLAN OF CARE
Problem: Respiratory - Adult  Goal: Achieves optimal ventilation and oxygenation  Outcome: Progressing  Flowsheets (Taken 2/17/2025 2000)  Achieves optimal ventilation and oxygenation:   Assess for changes in respiratory status   Assess for changes in mentation and behavior   Position to facilitate oxygenation and minimize respiratory effort   Oxygen supplementation based on oxygen saturation or arterial blood gases     Problem: Gastrointestinal - Adult  Goal: Maintains or returns to baseline bowel function  2/18/2025 0308 by Conrad Gage, RN  Outcome: Progressing  Flowsheets (Taken 2/17/2025 2000)  Maintains or returns to baseline bowel function: Assess bowel function  2/17/2025 1706 by Xavi Jeong, RN  Outcome: Progressing  Goal: Maintains adequate nutritional intake  Outcome: Progressing

## 2025-02-18 NOTE — PROGRESS NOTES
TPN: Day 5  BG high, Lantus increased to 15 units  bid and changed coverage to high dose  Will continuue with 20 units of Reg Insulin in TPn  Continue with 83ml/hr  Recent Labs     02/16/25  0307 02/16/25  1301 02/17/25  0500 02/18/25  0432     --  141 138   K 3.4*  --  3.8 3.9     --  109 107   CO2 26  --  26 26   BUN 15  --  17 16   GLUCOSE 193*  --  272* 283*   CALCIUM 6.9*  --  7.1* 7.1*   MG 1.75*  --  1.76* 1.62*   PHOS 1.8*  --  2.6 2.5   WBC 5.6  --  3.9* 2.9*   PHART  --    < >  --  7.298*    < > = values in this interval not displayed.       Sabiha Cid, [facility] 2/18/2025 11:51 AM

## 2025-02-18 NOTE — PROGRESS NOTES
[facility]                            Critical care Progress Note :      CC: COPD respiratory failure    Events of Last 24 hours:   Did not do we;ll with SBT  ABG Cio2 61   Propofol  /kg/min   Precedex   Off   PEEP 5  FiO2 40%  Minimal secretions   Small BM last night   Ileus/abscess   TPN per surgery  Surgery following       Vascular lines: IV: Left IJ     MV:   Vent Mode: AC/VC Resp Rate (Set): 26 bpm/Vt (Set, mL): 440 mL/ /FiO2 : 40 %  Recent Labs     25  043   PHART 7.321* 7.298*   DBD0HKY 52.7* 61.0*   PO2ART 111.3* 100.8       IV:   sodium chloride      PN-Adult Premix  - Standard Electrolytes - Central Line 83 mL/hr at 25 06    dexmedeTOMIDine HCl in NaCl 1.2 mcg/kg/hr (25 06)    propofol 5 mcg/kg/min (25 06)    sodium chloride      dextrose         Vitals:  Blood pressure (!) 107/45, pulse 54, temperature 97.6 °F (36.4 °C), temperature source Bladder, resp. rate 26, height 1.702 m (5' 7.01\"), weight 88.6 kg (195 lb 5.2 oz), SpO2 100%.  on   Temp  Av °F (36.7 °C)  Min: 97.6 °F (36.4 °C)  Max: 98.5 °F (36.9 °C)    Intake/Output Summary (Last 24 hours) at 2025 0858  Last data filed at 2025 0600  Gross per 24 hour   Intake 5628.05 ml   Output 1550 ml   Net 4078.05 ml     EXAM:  General: ill appearing.  Intubated sedated.  Eyes: No sclera icterus. No conjunctival injection.  ENT: No discharge. ET tube in place  Neck: Trachea midline.   Resp: No accessory muscle use. + crackles. No wheezing. + rhonchi.   CV: tachy rate. Regular rhythm. No mumur or rub. No edema.   GI: Non-tender. Non-distended.   Skin: Warm and dry. No rash on exposed extremities.  M/S: No cyanosis. No clubbing.   Neuro: Intubated sedated.  Not following commands.  Psych: Noncommunicative unable to obtain        Scheduled Meds:   dilTIAZem  60 mg Oral 3 times per day    chlordiazePOXIDE  50 mg Oral TID    vancomycin

## 2025-02-18 NOTE — PROGRESS NOTES
02/17/25 1951   Patient Observation   Pulse 62   Respirations 10   SpO2 100 %   Patient Observations ETT SIZE 7.5, SECURED AT 24 LIP LINE.  AMBU BAG AT HEAD OF BED.  HME.   Breath Sounds   Right Upper Lobe Diminished   Right Middle Lobe Diminished   Right Lower Lobe Diminished   Left Upper Lobe Diminished   Left Lower Lobe Diminished   Vent Information   Vent Mode AC/VC   Ventilator Settings   FiO2  40 %   Vt (Set, mL) 440 mL   Resp Rate (Set) 26 bpm   PEEP/CPAP (cmH2O) 5   Vent Patient Data (Readings)   Vt (Measured) 455 mL   Peak Inspiratory Pressure (cmH2O) 44 cmH2O   Rate Measured 26 br/min   Minute Volume (L/min) 11.8 Liters   Peak Inspiratory Flow (lpm) 75 L/sec   Mean Airway Pressure (cmH2O) 15 cmH20   Plateau Pressure (cm H2O) 26 cm H2O   Driving Pressure 21   I:E Ratio 1:2.60   Flow Sensitivity 3 L/min   Vent Alarm Settings   High Pressure (cmH2O) 50 cmH2O   Low Minute Volume (lpm) 4 L/min   High Minute Volume (lpm) 20 L/min   Low Exhaled Vt (ml) 250 mL   RR High (bpm) 35 br/min   Apnea (secs) 20 secs   Additional Respiratoray Assessments   Humidification Source HME   Circuit Condensation Drained   Ambu Bag With Mask At Bedside Yes   Airway Clearance   Suction ET Tube   Suction Device Inline suction catheter   Sputum Method Obtained Endotracheal   Sputum Amount Small   Sputum Color/Odor Clear   Sputum Consistency Thick   ETT    Placement Date/Time: 02/14/25 1135   Present on Admission/Arrival: No  Placed By: RT  Placement Verified By: Auscultation;Colorimetric ETCO2 device;Chest X-ray  Preoxygenation: Yes  Airway Tube Size: 7.5 mm  Secured At: 24 cm  Measured From: Lips   Secured At 24 cm   Measured From Lips   ETT Placement (S)  Right   Secured By Commercial tube may   Site Assessment Dry

## 2025-02-18 NOTE — PROGRESS NOTES
Comprehensive Nutrition Assessment    Type and Reason for Visit:  Reassess    Nutrition Recommendations/Plan:   Continue TPN regimen - Clinimix 5/20 with a goal rate of 83 ml/hr x 24 hours + lipids bi-weekly.   Monitor TPN intake and tolerance + administration of lipids bi-weekly.   Monitor respiratory/vent status, sedation type/amount (propofol is infusing at 5 mcg x 24 hours which = 70 kcals from lipids), TG checks, and plan of care.   Please obtain an updated weight for this patient - last weight was obtained on 2/13/25.   Monitor nutrition-related labs, bowel function + GI status, and weight trends.      Malnutrition Assessment:  Malnutrition Status:  Severe malnutrition (02/18/25 1227)    Context:  Acute Illness     Findings of the 6 clinical characteristics of malnutrition:  Energy Intake:  50% or less of estimated energy requirements for 5 or more days  Weight Loss:  Mild weight loss (-8# or 4.5% weight loss since 1/31/25)     Body Fat Loss:  Moderate body fat loss Orbital   Muscle Mass Loss:  Mild muscle mass loss Clavicles (pectoralis & deltoids), Calf (gastrocnemius)  Fluid Accumulation:  No fluid accumulation Extremities (bilateral hands are swollen)   Strength:  Not Performed    Nutrition Assessment:    patient is improved from a nutritional standpoint AEB TPN is infusing at goal rate without issue at this time; patient remains at risk for further compromise d/t need for TPN as sole source of nutrition, altered GI function, lab values, and endocrine dysfunction; will continue NPO status and Clinimix 5/20 E with a goal rate of 83 ml/hr x 24 hours + 250 ml of 20% lipids bi-weekly - lipids were administered in the evening on 2/17/25    Nutrition Related Findings:    patient remains intubated and he is sedated with 5 mcg propofol at this time; he is receiving TPN at goal rate without issue; + lipids administered on 2/17/25; BS were elevated this am so Lantus increased to 15 units BID and SSI was

## 2025-02-18 NOTE — PROGRESS NOTES
General Surgery  Daily Progress Note    Pt Name: Sandor Early  Medical Record Number: [id number]  Date of Birth 1955   Today's Date: 2/18/2025  Admit date: 1/30/2025  LOS: Day 19    SUBJECTIVE  Intubated and sedated.      OBJECTIVE  Vitals:    02/18/25 0838 02/18/25 0900 02/18/25 1000 02/18/25 1100   BP:  (!) 111/45 (!) 114/49 (!) 118/54   Pulse:  59 83 60   Resp:  26 23 23   Temp: 97.6 °F (36.4 °C)   97.7 °F (36.5 °C)   TempSrc: Bladder   Bladder   SpO2:  98% 99% 99%   Weight:       Height:           Gen: Sedated  Resp: Intubated.  CV: Regular rate. Regular rhythm.   GI: No pain response with palpation. +Softly distended. +Hypoactive. Minimal change to prior exam.  Skin: Warm and dry. No nodule or rash on exposed extremities.       I/O last 3 completed shifts:  In: 5688.1 [I.V.:895.5; Blood:376; NG/GT:60; IV Piggyback:1427.5]  Out: 2150 [Urine:1800; Emesis/NG output:350]  No intake/output data recorded.    LABS  CBC:   Recent Labs     02/16/25  0307 02/17/25  0500 02/17/25  1845 02/18/25  0432   WBC 5.6 3.9*  --  2.9*   HGB 7.4* 7.0* 8.2* 7.9*   HCT 22.8* 21.5* 25.6* 24.0*   MCV 93.8 93.7  --  94.6    220  --  235     BMP:   Recent Labs     02/16/25  0307 02/17/25  0500 02/18/25  0432    141 138   K 3.4* 3.8 3.9    109 107   CO2 26 26 26   PHOS 1.8* 2.6 2.5   BUN 15 17 16   CREATININE 0.9 0.9 0.8     LIVER PROFILE:   Recent Labs     02/16/25  0307 02/17/25  0500   AST 21 20   ALT 7* 8*   BILIDIR 0.3 0.2   BILITOT 0.4 0.3   ALKPHOS 126 138*       PT/INR:   No results for input(s): \"PROTIME\", \"INR\" in the last 72 hours.    APTT: No results for input(s): \"APTT\" in the last 72 hours.  UA:  No results for input(s): \"NITRITE\", \"COLORU\", \"PHUR\", \"LABCAST\", \"WBCUA\", \"RBCUA\", \"MUCUS\", \"TRICHOMONAS\", \"YEAST\", \"BACTERIA\", \"CLARITYU\", \"SPECGRAV\", \"LEUKOCYTESUR\", \"UROBILINOGEN\", \"BILIRUBINUR\", \"BLOODU\", \"GLUCOSEU\", \"AMORPHOUS\" in the last 72 hours.    Invalid input(s): \"KETONESU\"        IMAGING  XR  thickening or trace pleural effusion.  There are no acute findings   elsewhere in the chest.   2. Gastric tube in place which extends a short distance in the stomach with   the side port 2-3 cm above the domingo.  Advancement is recommended.         XR CHEST PORTABLE   Final Result   1. Bibasilar atelectasis.   2. Possible trace left pleural effusion.         XR ABDOMEN (2 VIEWS)   Final Result   Stable to slightly improved small bowel dilation.  No free air.         MRI BRAIN W WO CONTRAST         CT HEAD WO CONTRAST   Final Result   No acute intracranial abnormality.      Minimal fluid in the left maxillary sinus and left mastoid air cells.         XR ABDOMEN (2 VIEWS)   Final Result   No significant change in appearance of a suspected distal small bowel   obstruction, without evidence of free air.         XR ABDOMEN (2 VIEWS)   Final Result   Dilated loops of small bowel appear unchanged from the prior study compatible   with partial small bowel obstruction.         XR ABDOMEN (2 VIEWS)   Final Result   1. Unchanged dilated loops of small bowel.         XR ABDOMEN FOR NG/OG/NE TUBE PLACEMENT   Final Result   NG tube in place, with tip within the mid to upper body of the stomach.         CT ABDOMEN PELVIS W IV CONTRAST Additional Contrast? None   Final Result   1. Dilated small bowel loops with air-fluid levels and small bowel   fecalization in the terminal ileum. Findings are concerning for ileus versus   low-grade small bowel obstruction.   2. Circumferential wall thickening and hyperenhancement in the terminal   ileum. This may be related to infectious or inflammatory etiology.   3. Rim enhancing tubular fluid collection in the region of cecum extending   into the terminal ileum measuring 2.8 x 0.6 cm. Pre ileal fluid collection   measures 1.9 x 1.7 cm.  The findings could represent post appendectomy   seroma, hematoma versus abscess.   4. Patchy consolidation in the lung bases with mucous plugging in the left

## 2025-02-18 NOTE — PROGRESS NOTES
02/18/25 0257   Patient Observation   Pulse 60   Respirations 26   SpO2 99 %   Patient Observations ETT SIZE 7.5, SECURED AT 24 LIP LINE.  AMBU BAG AT EHAD OF BED.  HME.   Breath Sounds   Right Upper Lobe Diminished   Right Middle Lobe Diminished   Right Lower Lobe Diminished   Left Upper Lobe Diminished   Left Lower Lobe Diminished   Vent Information   Vent Mode AC/VC   Ventilator Settings   FiO2  40 %   Vt (Set, mL) 440 mL   Resp Rate (Set) 26 bpm   PEEP/CPAP (cmH2O) 5   Vent Patient Data (Readings)   Vt (Measured) 459 mL   Peak Inspiratory Pressure (cmH2O) 42 cmH2O   Rate Measured 26 br/min   Minute Volume (L/min) 11.7 Liters   Peak Inspiratory Flow (lpm) 75 L/sec   Mean Airway Pressure (cmH2O) 14 cmH20   Plateau Pressure (cm H2O) 28 cm H2O   Driving Pressure 23   I:E Ratio 1:2.60   Flow Sensitivity 3 L/min   Vent Alarm Settings   High Pressure (cmH2O) 50 cmH2O   Low Minute Volume (lpm) 4 L/min   High Minute Volume (lpm) 20 L/min   Low Exhaled Vt (ml) 250 mL   High Exhaled Vt (ml) 1000 mL   RR High (bpm) 35 br/min   Apnea (secs) 20 secs   Additional Respiratoray Assessments   Humidification Source HME   Circuit Condensation Drained   Ambu Bag With Mask At Bedside Yes   Airway Clearance   Suction ET Tube   Suction Device Inline suction catheter   Sputum Method Obtained Endotracheal   Sputum Amount Small   Sputum Color/Odor Clear   Sputum Consistency Thick   ETT    Placement Date/Time: 02/14/25 1135   Present on Admission/Arrival: No  Placed By: RT  Placement Verified By: Auscultation;Colorimetric ETCO2 device;Chest X-ray  Preoxygenation: Yes  Airway Tube Size: 7.5 mm  Secured At: 24 cm  Measured From: Lips   Secured At 24 cm   Measured From Lips   ETT Placement (S)  Left   Secured By Commercial tube may   Site Assessment Dry

## 2025-02-18 NOTE — PROGRESS NOTES
Progress Note    Patient Sandor Early  MRN: [id number]  YOB: 1955 Age: 69 y.o. Sex: male  Room: [address] [place] [address]       Admitting Physician: Luda Richey MD   Date of Admission: 1/30/2025  5:28 PM   Primary Care Physician: Juliet Mayorga MD     Subjective:  Sandor Early was seen and examined. We are following for SBO.  -- NG clamped,     ROS:  Constitutional: Denies fever, no change in appetite  Respiratory: Denies cough or shortness of breath  Cardiovascular: Denies chest pain or edema    Objective:  Vital Signs:   Vitals:    02/18/25 1700   BP: (!) 123/45   Pulse: 64   Resp: 18   Temp:    SpO2: 98%         Physical Exam:  Constitutional:intubated  HEENT: Sclera anicteric, mucosal membranes moist  Cardiovascular: Regular rate and rhythm.  No murmurs.  Respiratory: Respirations nonlabored, no crepitus  GI: Abdomen nondistended, soft, and nontender.  Minimal bowel sounds.  No masses palpable.   Rectal: Deferred  Musculoskeletal:  No pitting edema of the lower legs.  Neurological:on vent    Intake/Output:    Intake/Output Summary (Last 24 hours) at 2/18/2025 1742  Last data filed at 2/18/2025 0600  Gross per 24 hour   Intake 5252.05 ml   Output 1550 ml   Net 3702.05 ml        Current Medications:  Current Facility-Administered Medications   Medication Dose Route Frequency Provider Last Rate Last Admin    insulin glargine (LANTUS) injection vial 15 Units  15 Units SubCUTAneous BID Todd Joyce MD   15 Units at 02/18/25 1213    PN-Adult Premix 5/20 - Standard Electrolytes - Central Line   IntraVENous Continuous TPN Emory Penaloza MD        [Held by provider] dilTIAZem (CARDIZEM) immediate release tablet 60 mg  60 mg Oral 3 times per day Todd Joyce MD   60 mg at 02/18/25 0529    [Held by provider] chlordiazePOXIDE (LIBRIUM) capsule 50 mg  50 mg Oral TID Todd Joyce MD   50 mg at 02/18/25 1349    vancomycin (VANCOCIN) 1,000 mg in sodium chloride 0.9 % 250 mL IVPB  1,000 mg

## 2025-02-18 NOTE — PROGRESS NOTES
02/17/25 2258   Patient Observation   Pulse 61   Respirations 26   SpO2 99 %   Patient Observations ETT SIZE 7.5, SECURED AT 24 LIP LINE.  AMBU BAG AT HEAD OF BED.  HME CHANGED.   Breath Sounds   Right Upper Lobe Diminished   Right Middle Lobe Diminished   Right Lower Lobe Diminished   Left Upper Lobe Diminished   Left Lower Lobe Diminished   Vent Information   Vent Mode AC/VC   Ventilator Settings   FiO2  40 %   Vt (Set, mL) 440 mL   Resp Rate (Set) 26 bpm   PEEP/CPAP (cmH2O) 5   Vent Patient Data (Readings)   Vt (Measured) 454 mL   Peak Inspiratory Pressure (cmH2O) 43 cmH2O   Rate Measured 26 br/min   Minute Volume (L/min) 11.8 Liters   Peak Inspiratory Flow (lpm) 75 L/sec   Mean Airway Pressure (cmH2O) 14 cmH20   Plateau Pressure (cm H2O) 28 cm H2O   Driving Pressure 23   I:E Ratio 1:2.60   Flow Sensitivity 3 L/min   Vent Alarm Settings   High Pressure (cmH2O) 50 cmH2O   Low Minute Volume (lpm) 4 L/min   High Minute Volume (lpm) 20 L/min   Low Exhaled Vt (ml) 250 mL   High Exhaled Vt (ml) 100 mL   RR High (bpm) 35 br/min   Apnea (secs) 20 secs   Additional Respiratoray Assessments   Humidification Source HME   Circuit Condensation Drained   Ambu Bag With Mask At Bedside Yes   Airway Clearance   Suction ET Tube   Suction Device Inline suction catheter   Sputum Method Obtained Endotracheal   Sputum Amount Small   Sputum Color/Odor Clear   Sputum Consistency Thick   ETT    Placement Date/Time: 02/14/25 1135   Present on Admission/Arrival: No  Placed By: RT  Placement Verified By: Auscultation;Colorimetric ETCO2 device;Chest X-ray  Preoxygenation: Yes  Airway Tube Size: 7.5 mm  Secured At: 24 cm  Measured From: Lips   Secured At 24 cm   Measured From Lips   ETT Placement (S)  Center   Secured By Commercial tube may   Site Assessment Dry

## 2025-02-19 ENCOUNTER — APPOINTMENT (OUTPATIENT)
Dept: CT IMAGING | Age: 70
DRG: 207 | End: 2025-02-19
Payer: MEDICARE

## 2025-02-19 LAB
ALBUMIN SERPL-MCNC: 1.6 G/DL (ref 3.4–5)
ALP SERPL-CCNC: 201 U/L (ref 40–129)
ALT SERPL-CCNC: 12 U/L (ref 10–40)
ANION GAP SERPL CALCULATED.3IONS-SCNC: 1 MMOL/L (ref 3–16)
AST SERPL-CCNC: 22 U/L (ref 15–37)
BASE EXCESS BLDA CALC-SCNC: 4.5 MMOL/L (ref -3–3)
BASOPHILS # BLD: 0 K/UL (ref 0–0.2)
BASOPHILS NFR BLD: 0.5 %
BILIRUB DIRECT SERPL-MCNC: 0.2 MG/DL (ref 0–0.3)
BILIRUB INDIRECT SERPL-MCNC: 0.1 MG/DL (ref 0–1)
BILIRUB SERPL-MCNC: 0.3 MG/DL (ref 0–1)
BUN SERPL-MCNC: 14 MG/DL (ref 7–20)
CALCIUM SERPL-MCNC: 7.4 MG/DL (ref 8.3–10.6)
CHLORIDE SERPL-SCNC: 107 MMOL/L (ref 99–110)
CO2 BLDA-SCNC: 32.3 MMOL/L
CO2 SERPL-SCNC: 33 MMOL/L (ref 21–32)
COHGB MFR BLDA: 0.3 % (ref 0–1.5)
CREAT SERPL-MCNC: 0.8 MG/DL (ref 0.8–1.3)
DEPRECATED RDW RBC AUTO: 16.2 % (ref 12.4–15.4)
EOSINOPHIL # BLD: 0 K/UL (ref 0–0.6)
EOSINOPHIL NFR BLD: 0.9 %
GFR SERPLBLD CREATININE-BSD FMLA CKD-EPI: >90 ML/MIN/{1.73_M2}
GLUCOSE BLD-MCNC: 218 MG/DL (ref 70–99)
GLUCOSE BLD-MCNC: 221 MG/DL (ref 70–99)
GLUCOSE BLD-MCNC: 222 MG/DL (ref 70–99)
GLUCOSE BLD-MCNC: 222 MG/DL (ref 70–99)
GLUCOSE BLD-MCNC: 235 MG/DL (ref 70–99)
GLUCOSE BLD-MCNC: 236 MG/DL (ref 70–99)
GLUCOSE BLD-MCNC: 239 MG/DL (ref 70–99)
GLUCOSE SERPL-MCNC: 255 MG/DL (ref 70–99)
HCO3 BLDA-SCNC: 30.7 MMOL/L (ref 21–29)
HCT VFR BLD AUTO: 23.4 % (ref 40.5–52.5)
HGB BLD-MCNC: 7.8 G/DL (ref 13.5–17.5)
HGB BLDA-MCNC: 10.7 G/DL (ref 13.5–17.5)
LACTATE BLDV-SCNC: 0.7 MMOL/L (ref 0.4–2)
LYMPHOCYTES # BLD: 0.6 K/UL (ref 1–5.1)
LYMPHOCYTES NFR BLD: 18.5 %
MAGNESIUM SERPL-MCNC: 1.82 MG/DL (ref 1.8–2.4)
MCH RBC QN AUTO: 30.7 PG (ref 26–34)
MCHC RBC AUTO-ENTMCNC: 33.2 G/DL (ref 31–36)
MCV RBC AUTO: 92.5 FL (ref 80–100)
METHGB MFR BLDA: 0 %
MONOCYTES # BLD: 0.4 K/UL (ref 0–1.3)
MONOCYTES NFR BLD: 11.9 %
NEUTROPHILS # BLD: 2.2 K/UL (ref 1.7–7.7)
NEUTROPHILS NFR BLD: 68.2 %
O2 THERAPY: ABNORMAL
PCO2 BLDA: 53.4 MMHG (ref 35–45)
PERFORMED ON: ABNORMAL
PH BLDA: 7.38 [PH] (ref 7.35–7.45)
PHOSPHATE SERPL-MCNC: 2.6 MG/DL (ref 2.5–4.9)
PLATELET # BLD AUTO: 239 K/UL (ref 135–450)
PMV BLD AUTO: 8.3 FL (ref 5–10.5)
PO2 BLDA: 89.5 MMHG (ref 75–108)
POTASSIUM SERPL-SCNC: 3.8 MMOL/L (ref 3.5–5.1)
PROT SERPL-MCNC: 6.2 G/DL (ref 6.4–8.2)
RBC # BLD AUTO: 2.53 M/UL (ref 4.2–5.9)
SAO2 % BLDA: 96.5 %
SODIUM SERPL-SCNC: 141 MMOL/L (ref 136–145)
TRIGL SERPL-MCNC: 147 MG/DL (ref 0–150)
WBC # BLD AUTO: 3.2 K/UL (ref 4–11)

## 2025-02-19 PROCEDURE — 84100 ASSAY OF PHOSPHORUS: CPT

## 2025-02-19 PROCEDURE — 36415 COLL VENOUS BLD VENIPUNCTURE: CPT

## 2025-02-19 PROCEDURE — 99291 CRITICAL CARE FIRST HOUR: CPT | Performed by: INTERNAL MEDICINE

## 2025-02-19 PROCEDURE — 99233 SBSQ HOSP IP/OBS HIGH 50: CPT | Performed by: SURGERY

## 2025-02-19 PROCEDURE — 2700000000 HC OXYGEN THERAPY PER DAY

## 2025-02-19 PROCEDURE — 2500000003 HC RX 250 WO HCPCS: Performed by: STUDENT IN AN ORGANIZED HEALTH CARE EDUCATION/TRAINING PROGRAM

## 2025-02-19 PROCEDURE — 2500000003 HC RX 250 WO HCPCS: Performed by: INTERNAL MEDICINE

## 2025-02-19 PROCEDURE — 99232 SBSQ HOSP IP/OBS MODERATE 35: CPT | Performed by: INTERNAL MEDICINE

## 2025-02-19 PROCEDURE — 82803 BLOOD GASES ANY COMBINATION: CPT

## 2025-02-19 PROCEDURE — 2580000003 HC RX 258: Performed by: INTERNAL MEDICINE

## 2025-02-19 PROCEDURE — 2500000003 HC RX 250 WO HCPCS: Performed by: SURGERY

## 2025-02-19 PROCEDURE — 83605 ASSAY OF LACTIC ACID: CPT

## 2025-02-19 PROCEDURE — 6370000000 HC RX 637 (ALT 250 FOR IP): Performed by: INTERNAL MEDICINE

## 2025-02-19 PROCEDURE — 74177 CT ABD & PELVIS W/CONTRAST: CPT

## 2025-02-19 PROCEDURE — 6370000000 HC RX 637 (ALT 250 FOR IP): Performed by: STUDENT IN AN ORGANIZED HEALTH CARE EDUCATION/TRAINING PROGRAM

## 2025-02-19 PROCEDURE — 83735 ASSAY OF MAGNESIUM: CPT

## 2025-02-19 PROCEDURE — APPSS15 APP SPLIT SHARED TIME 0-15 MINUTES

## 2025-02-19 PROCEDURE — 85025 COMPLETE CBC W/AUTO DIFF WBC: CPT

## 2025-02-19 PROCEDURE — 6360000002 HC RX W HCPCS: Performed by: INTERNAL MEDICINE

## 2025-02-19 PROCEDURE — 6370000000 HC RX 637 (ALT 250 FOR IP): Performed by: SURGERY

## 2025-02-19 PROCEDURE — 6360000004 HC RX CONTRAST MEDICATION: Performed by: SURGERY

## 2025-02-19 PROCEDURE — 94761 N-INVAS EAR/PLS OXIMETRY MLT: CPT

## 2025-02-19 PROCEDURE — 94003 VENT MGMT INPAT SUBQ DAY: CPT

## 2025-02-19 PROCEDURE — 80048 BASIC METABOLIC PNL TOTAL CA: CPT

## 2025-02-19 PROCEDURE — 80076 HEPATIC FUNCTION PANEL: CPT

## 2025-02-19 PROCEDURE — 94640 AIRWAY INHALATION TREATMENT: CPT

## 2025-02-19 PROCEDURE — 84478 ASSAY OF TRIGLYCERIDES: CPT

## 2025-02-19 PROCEDURE — 2000000000 HC ICU R&B

## 2025-02-19 RX ORDER — INSULIN LISPRO 100 [IU]/ML
0-4 INJECTION, SOLUTION INTRAVENOUS; SUBCUTANEOUS EVERY 4 HOURS
Status: DISCONTINUED | OUTPATIENT
Start: 2025-02-19 | End: 2025-02-20

## 2025-02-19 RX ORDER — IOPAMIDOL 755 MG/ML
75 INJECTION, SOLUTION INTRAVASCULAR
Status: COMPLETED | OUTPATIENT
Start: 2025-02-19 | End: 2025-02-19

## 2025-02-19 RX ORDER — ATORVASTATIN CALCIUM 40 MG/1
TABLET, FILM COATED ORAL
Qty: 30 TABLET | Refills: 2 | Status: SHIPPED | OUTPATIENT
Start: 2025-02-19

## 2025-02-19 RX ORDER — INSULIN LISPRO 100 [IU]/ML
0-4 INJECTION, SOLUTION INTRAVENOUS; SUBCUTANEOUS ONCE
Status: COMPLETED | OUTPATIENT
Start: 2025-02-19 | End: 2025-02-19

## 2025-02-19 RX ADMIN — ALBUTEROL SULFATE 2.5 MG: 2.5 SOLUTION RESPIRATORY (INHALATION) at 11:57

## 2025-02-19 RX ADMIN — INSULIN LISPRO 1 UNITS: 100 INJECTION, SOLUTION INTRAVENOUS; SUBCUTANEOUS at 01:37

## 2025-02-19 RX ADMIN — IOPAMIDOL 75 ML: 755 INJECTION, SOLUTION INTRAVENOUS at 14:15

## 2025-02-19 RX ADMIN — INSULIN LISPRO 1 UNITS: 100 INJECTION, SOLUTION INTRAVENOUS; SUBCUTANEOUS at 08:25

## 2025-02-19 RX ADMIN — THIAMINE HYDROCHLORIDE 200 MG: 100 INJECTION, SOLUTION INTRAMUSCULAR; INTRAVENOUS at 10:10

## 2025-02-19 RX ADMIN — DEXMEDETOMIDINE HYDROCHLORIDE 1.2 MCG/KG/HR: 4 INJECTION, SOLUTION INTRAVENOUS at 20:21

## 2025-02-19 RX ADMIN — SODIUM CHLORIDE 1000 MG: 9 INJECTION, SOLUTION INTRAVENOUS at 07:28

## 2025-02-19 RX ADMIN — DEXMEDETOMIDINE HYDROCHLORIDE 1.2 MCG/KG/HR: 4 INJECTION, SOLUTION INTRAVENOUS at 05:01

## 2025-02-19 RX ADMIN — PIPERACILLIN AND TAZOBACTAM 3375 MG: 3; .375 INJECTION, POWDER, LYOPHILIZED, FOR SOLUTION INTRAVENOUS at 09:30

## 2025-02-19 RX ADMIN — ASCORBIC ACID, VITAMIN A PALMITATE, CHOLECALCIFEROL, THIAMINE HYDROCHLORIDE, RIBOFLAVIN-5 PHOSPHATE SODIUM, PYRIDOXINE HYDROCHLORIDE, NIACINAMIDE, DEXPANTHENOL, ALPHA-TOCOPHEROL ACETATE, VITAMIN K1, FOLIC ACID, BIOTIN, CYANOCOBALAMIN: 200; 3300; 200; 6; 3.6; 6; 40; 15; 10; 150; 600; 60; 5 INJECTION, SOLUTION INTRAVENOUS at 18:07

## 2025-02-19 RX ADMIN — FLUCONAZOLE 200 MG: 2 INJECTION, SOLUTION INTRAVENOUS at 10:13

## 2025-02-19 RX ADMIN — ENOXAPARIN SODIUM 40 MG: 100 INJECTION SUBCUTANEOUS at 08:25

## 2025-02-19 RX ADMIN — DEXMEDETOMIDINE HYDROCHLORIDE 1.2 MCG/KG/HR: 4 INJECTION, SOLUTION INTRAVENOUS at 12:52

## 2025-02-19 RX ADMIN — PROPOFOL 7.5 MCG/KG/MIN: 10 INJECTION, EMULSION INTRAVENOUS at 19:04

## 2025-02-19 RX ADMIN — DEXMEDETOMIDINE HYDROCHLORIDE 1.2 MCG/KG/HR: 4 INJECTION, SOLUTION INTRAVENOUS at 09:04

## 2025-02-19 RX ADMIN — GABAPENTIN 200 MG: 250 SOLUTION ORAL at 20:38

## 2025-02-19 RX ADMIN — INSULIN LISPRO 1 UNITS: 100 INJECTION, SOLUTION INTRAVENOUS; SUBCUTANEOUS at 13:09

## 2025-02-19 RX ADMIN — PIPERACILLIN AND TAZOBACTAM 3375 MG: 3; .375 INJECTION, POWDER, LYOPHILIZED, FOR SOLUTION INTRAVENOUS at 01:43

## 2025-02-19 RX ADMIN — GABAPENTIN 200 MG: 250 SOLUTION ORAL at 13:10

## 2025-02-19 RX ADMIN — SODIUM CHLORIDE 1000 MG: 9 INJECTION, SOLUTION INTRAVENOUS at 18:02

## 2025-02-19 RX ADMIN — PANTOPRAZOLE SODIUM 40 MG: 40 INJECTION, POWDER, LYOPHILIZED, FOR SOLUTION INTRAVENOUS at 08:26

## 2025-02-19 RX ADMIN — PIPERACILLIN AND TAZOBACTAM 3375 MG: 3; .375 INJECTION, POWDER, LYOPHILIZED, FOR SOLUTION INTRAVENOUS at 16:53

## 2025-02-19 RX ADMIN — ALBUTEROL SULFATE 2.5 MG: 2.5 SOLUTION RESPIRATORY (INHALATION) at 07:57

## 2025-02-19 RX ADMIN — INSULIN GLARGINE 15 UNITS: 100 INJECTION, SOLUTION SUBCUTANEOUS at 08:25

## 2025-02-19 RX ADMIN — SODIUM CHLORIDE, PRESERVATIVE FREE 10 ML: 5 INJECTION INTRAVENOUS at 08:27

## 2025-02-19 RX ADMIN — ALBUTEROL SULFATE 2.5 MG: 2.5 SOLUTION RESPIRATORY (INHALATION) at 15:27

## 2025-02-19 RX ADMIN — DEXMEDETOMIDINE HYDROCHLORIDE 1.2 MCG/KG/HR: 4 INJECTION, SOLUTION INTRAVENOUS at 01:10

## 2025-02-19 RX ADMIN — DEXMEDETOMIDINE HYDROCHLORIDE 1.2 MCG/KG/HR: 4 INJECTION, SOLUTION INTRAVENOUS at 16:53

## 2025-02-19 RX ADMIN — INSULIN LISPRO 1 UNITS: 100 INJECTION, SOLUTION INTRAVENOUS; SUBCUTANEOUS at 16:58

## 2025-02-19 RX ADMIN — ALBUTEROL SULFATE 2.5 MG: 2.5 SOLUTION RESPIRATORY (INHALATION) at 19:33

## 2025-02-19 RX ADMIN — GABAPENTIN 200 MG: 250 SOLUTION ORAL at 16:58

## 2025-02-19 RX ADMIN — INSULIN GLARGINE 15 UNITS: 100 INJECTION, SOLUTION SUBCUTANEOUS at 20:38

## 2025-02-19 RX ADMIN — INSULIN LISPRO 1 UNITS: 100 INJECTION, SOLUTION INTRAVENOUS; SUBCUTANEOUS at 20:37

## 2025-02-19 RX ADMIN — GABAPENTIN 200 MG: 250 SOLUTION ORAL at 08:26

## 2025-02-19 RX ADMIN — SODIUM CHLORIDE, PRESERVATIVE FREE 10 ML: 5 INJECTION INTRAVENOUS at 20:38

## 2025-02-19 ASSESSMENT — PULMONARY FUNCTION TESTS
PIF_VALUE: 39
PIF_VALUE: 35
PIF_VALUE: 38
PIF_VALUE: 41
PIF_VALUE: 34
PIF_VALUE: 35
PIF_VALUE: 44
PIF_VALUE: 33
PIF_VALUE: 33
PIF_VALUE: 37

## 2025-02-19 NOTE — PROGRESS NOTES
4 Eyes Skin Assessment     NAME:  Sandor Early  YOB: 1955  MEDICAL RECORD NUMBER:  [id number]    The patient is being assessed for  Other Shift assessment     I agree that at least one RN has performed a thorough Head to Toe Skin Assessment on the patient. ALL assessment sites listed below have been assessed.      Areas assessed by both nurses:    Head, Face, Ears, Shoulders, Back, Chest, Arms, Elbows, Hands, Sacrum. Buttock, Coccyx, Ischium, Legs. Feet and Heels, and Under Medical Devices     Blanchable redness to coccyx  Scattered brusing     Does the Patient have a Wound? No noted wound(s)       Abraham Prevention initiated by RN: Yes  Wound Care Orders initiated by RN: No    Pressure Injury (Stage 3,4, Unstageable, DTI, NWPT, and Complex wounds) if present, place Wound referral order by RN under : No    New Ostomies, if present place, Ostomy referral order under : No     Nurse 1 eSignature: Electronically signed by Merrill Acosta RN on 2/19/25 at 6:57 PM EST    **SHARE this note so that the co-signing nurse can place an eSignature**    Nurse 2 eSignature: Electronically signed by Vitaly Tong RN on 2/19/25 at 6:59 PM EST

## 2025-02-19 NOTE — PROGRESS NOTES
02/18/25 1953   Patient Observation   Pulse 66   Respirations 22   SpO2 99 %   Patient Observations ETT SIZE 7.5, SECURED AT 24 LIP LINE.  AMBU BAG AT HEAD OF BED.  HME.   Breath Sounds   Right Upper Lobe Diminished   Right Middle Lobe Diminished   Right Lower Lobe Diminished   Left Upper Lobe Diminished   Left Lower Lobe Diminished   Vent Information   Vent Mode AC/VC   Ventilator Settings   FiO2  35 %   Vt (Set, mL) 500 mL   Resp Rate (Set) 22 bpm   PEEP/CPAP (cmH2O) 5   Vent Patient Data (Readings)   Vt (Measured) 519 mL   Peak Inspiratory Pressure (cmH2O) 49 cmH2O   Rate Measured 22 br/min   Minute Volume (L/min) 11.4 Liters   Peak Inspiratory Flow (lpm) 80 L/sec   Mean Airway Pressure (cmH2O) 15 cmH20   Plateau Pressure (cm H2O) 24 cm H2O   Driving Pressure 19   I:E Ratio 1:3.00   Flow Sensitivity 3 L/min   Static Compliance (L/cm H2O) 27   Dynamic Compliance (L/cm H2O) 12 L/cm H2O   Vent Alarm Settings   High Pressure (cmH2O) 55 cmH2O   Low Minute Volume (lpm) 4 L/min   High Minute Volume (lpm) 20 L/min   Low Exhaled Vt (ml) 250 mL   High Exhaled Vt (ml) 1000 mL   RR High (bpm) 40 br/min   Apnea (secs) 20 secs   Additional Respiratoray Assessments   Humidification Source HME   Circuit Condensation Drained   Ambu Bag With Mask At Bedside Yes   Airway Clearance   Suction ET Tube   Suction Device Inline suction catheter   Sputum Method Obtained Endotracheal   Sputum Amount Scant   Sputum Color/Odor Shrestha   Sputum Consistency Thick   ETT    Placement Date/Time: 02/14/25 1135   Present on Admission/Arrival: No  Placed By: RT  Placement Verified By: Auscultation;Colorimetric ETCO2 device;Chest X-ray  Preoxygenation: Yes  Airway Tube Size: 7.5 mm  Secured At: 24 cm  Measured From: Lips   Secured At 24 cm   Measured From Lips   ETT Placement (S)  Center   Secured By Commercial tube may   Site Assessment Dry

## 2025-02-19 NOTE — PROGRESS NOTES
Progress Note    Admit Date:  1/30/2025    Interval history:  Influenza A and Acute respiratory failure   Was on bipap in iCU and improved, transferred to [facility]        Has NG placed for Ileus  , he pulled out NG and likely has aspirated became hypoxic and transferred to ICU      Seizure like event 2/7,   Remains confused  MRI brain incomplete .     2/13   Mentation worse today  On precedex   On 4 L of O2     2/19  Remains on vent support  No BM , abd remains distended   Sputum with MRSA , added vanc, no fevers  Sugars high with TPN    Subjective:  Mr. Early seen sedated on vent no distress  No fevers. BP stable       Objective:   Patient Vitals for the past 4 hrs:   BP Temp Temp src Pulse Resp SpO2   02/19/25 0404 (!) 127/50 -- -- 79 22 99 %   02/19/25 0400 (!) 127/50 (!) 100.9 °F (38.3 °C) Bladder 79 24 98 %   02/19/25 0337 -- -- -- 79 23 99 %          Intake/Output Summary (Last 24 hours) at 2/19/2025 0715  Last data filed at 2/19/2025 0145  Gross per 24 hour   Intake 3200.27 ml   Output 1850 ml   Net 1350.27 ml       Physical Exam:        General:  elderly male sedated on vent  Oral ETT and OG noted  Left IJ TLC . Appears to be not in any distress  Mucous Membranes:  Pink , anicteric  Neck: No JVD, no carotid bruit, no thyromegaly  Chest:  Clear to auscultation bilaterally, resolved wheeze  Cardiovascular:  RRR S1S2 heard, no murmurs or gallops  Abdomen:  Soft, obese +distended, non tender, no organomegaly, BS absent  Extremities: trace edema to ext improved   Distal pulses well felt  Neurological : sedated        Medications:  insulin lispro, 0-4 Units, Q4H  insulin glargine, 15 Units, BID  [Held by provider] dilTIAZem, 60 mg, 3 times per day  vancomycin, 1,000 mg, Q12H  [Held by provider] metoprolol tartrate, 50 mg, BID  Gabapentin, 200 mg, 4x Daily  albuterol, 2.5 mg, 4x Daily RT  fluconazole, 200 mg, Q24H  fat emulsion, 250 mL, Once per day on Monday Thursday  sodium chloride flush, 5-40 mL, 2 times per

## 2025-02-19 NOTE — PROGRESS NOTES
TPN- Day 6  BG still high, will increase insulin to 30 units, on lantus 15 units bid and low dose coverage  All other lytes good, continue with 83ml/hr  Recent Labs     02/17/25  0500 02/18/25  0432 02/18/25  1200 02/19/25  0452 02/19/25  0458    138  --  141  --    K 3.8 3.9  --  3.8  --     107  --  107  --    CO2 26 26  --  33*  --    BUN 17 16  --  14  --    GLUCOSE 272* 283*  --  255*  --    CALCIUM 7.1* 7.1*  --  7.4*  --    MG 1.76* 1.62*  --  1.82  --    PHOS 2.6 2.5  --  2.6  --    TRIG  --   --   --  147  --    WBC 3.9* 2.9*  --  3.2*  --    PHART  --  7.298*   < >  --  7.377    < > = values in this interval not displayed.       Sabiha Cid, [facility] 2/19/2025 12:32 PM

## 2025-02-19 NOTE — PROGRESS NOTES
Physician Progress Note      PATIENT:               ROEL MONTOYA  CSN #:                  [id number]  :                       1955  ADMIT DATE:       2025 5:28 PM  DISCH DATE:  RESPONDING  PROVIDER #:        Erick Palm MD          QUERY TEXT:    Patient admitted with AECOPD and influenza A. Noted to have documentation of   weight loss, dietician assessment with malnutrition diagnosis, etc. in the PN   with TPN started as pending malnutrition. Noted new documentation from   Nutrition consult on  PN in the consult. If possible, please document in   progress notes and discharge summary if you are evaluating and /or treating   any of the following:    The medical record reflects the following:    Risk Factors: NPO status, possible SBO vs ileus, and hx of COPD + patient is   flu positive, Pneumonia  Clinical Indicators: Nutrition Diagnosis: Severe malnutrition, in context of   acute illness or injury related to impaired respiratory function, inadequate   protein-energy intake, altered GI function as evidenced by NPO or clear liquid   status due to medical condition, intubation, nutrition support - parenteral   nutrition, swallow study results, lab values, GI abnormality  Treatment: Nutrition consulted, Continue TPN regimen, Monitor TPN intake and   tolerance + administration of lipids bi-weekly, Monitor respiratory/vent   status, sedation type/amount (propofol is infusing at 5 mcg x 24 hours which =   70 kcals from lipids), TG checks, and plan of care. Monitor nutrition-related   labs, bowel function + GI status, and weight trends.    ASPEN Criteria:    https://aspenjournals.onlinelibrary.bullard.com/doi/full/10.1177/292018907343372  5  Options provided:  -- Protein calorie malnutrition severe  -- Other - I will add my own diagnosis  -- Disagree - Not applicable / Not valid  -- Disagree - Clinically unable to determine / Unknown  -- Refer to Clinical Documentation Reviewer    PROVIDER RESPONSE

## 2025-02-19 NOTE — PROGRESS NOTES
02/19/25 1533   Patient Observation   Pulse 82   Respirations 24   SpO2 94 %   Breath Sounds   Breath Sounds Bilateral Rhonchi   Vent Information   Equipment Changed HME   Vent Mode AC/VC   Ventilator Settings   FiO2  35 %   Vt (Set, mL) 500 mL   Resp Rate (Set) 18 bpm   PEEP/CPAP (cmH2O) 5   Vent Patient Data (Readings)   Vt (Measured) 507 mL   Peak Inspiratory Pressure (cmH2O) 37 cmH2O   Rate Measured 27 br/min   Minute Volume (L/min) 13.8 Liters   Mean Airway Pressure (cmH2O) 15 cmH20   Plateau Pressure (cm H2O) 21 cm H2O   Driving Pressure 16   I:E Ratio 1:2.00   Backup Apnea On   Backup Rate 18 Breaths Per Minute   Backup Vt 500   Vent Alarm Settings   High Pressure (cmH2O) 55 cmH2O   Low Minute Volume (lpm) 4 L/min   High Minute Volume (lpm) 20 L/min   Low Exhaled Vt (ml) 250 mL   High Exhaled Vt (ml) 1000 mL   RR High (bpm) 40 br/min   Additional Respiratoray Assessments   Humidification Source HME   Circuit Condensation Drained   Ambu Bag With Mask At Bedside Yes   Airway Clearance   Suction ET Tube   Subglottic Suction Done Yes   Suction Device Inline suction catheter   Sputum Method Obtained Endotracheal   Sputum Amount Moderate   Sputum Color/Odor Tan;Yellow   Sputum Consistency Thick   ETT    Placement Date/Time: 02/14/25 1135   Present on Admission/Arrival: No  Placed By: RT  Placement Verified By: Auscultation;Colorimetric ETCO2 device;Chest X-ray  Preoxygenation: Yes  Airway Tube Size: 7.5 mm  Secured At: 24 cm  Measured From: Lips   Secured At 24 cm   Measured From Lips   ETT Placement Center   Secured By Commercial tube quispe   Site Assessment Dry   Tie/Quispe Changed No   Ventilator Associated Pneumonia Bundle   Elevation of Head of Bed to 30-45 Degrees  Yes   Oral Care Completed Yes   Oral Care Performed Mouth suctioned;Mouth moisturizer;Mouth swabbed   Oral Suctioning Yes   ETT/Trach Suctioning Yes   Respiratory   Respiratory Interventions H.O.B. elevated;Splinting;Suction - oral

## 2025-02-19 NOTE — PROGRESS NOTES
General Surgery  Daily Progress Note    Pt Name: Sandor Early  Medical Record Number: [id number]  Date of Birth 1955   Today's Date: 2/19/2025  Admit date: 1/30/2025  LOS: Day 20    SUBJECTIVE  Intubated and sedated.      OBJECTIVE  Vitals:    02/19/25 0757 02/19/25 0758 02/19/25 0800 02/19/25 0812   BP:   (!) 135/49    Pulse: 82 81 80    Resp: 30 30 24    Temp:   (!) 100.8 °F (38.2 °C) 97.5 °F (36.4 °C)   TempSrc:   Bladder    SpO2: 97% 98% 98%    Weight:       Height:           Gen: Sedated  Resp: Intubated.  CV: Regular rate. Regular rhythm.   GI: No pain response with palpation. +Softly distended. +Hypoactive. Minimal change to prior exam.  Skin: Warm and dry. No nodule or rash on exposed extremities.       I/O last 3 completed shifts:  In: 8452.3 [I.V.:1481.6; IV Piggyback:2376.4]  Out: 2650 [Urine:2050; Emesis/NG output:600]  I/O this shift:  In: 733.7 [I.V.:174.6; IV Piggyback:85.8]  Out: 450 [Urine:450]    LABS  CBC:   Recent Labs     02/17/25  0500 02/17/25  1845 02/18/25  0432 02/19/25  0452   WBC 3.9*  --  2.9* 3.2*   HGB 7.0* 8.2* 7.9* 7.8*   HCT 21.5* 25.6* 24.0* 23.4*   MCV 93.7  --  94.6 92.5     --  235 239     BMP:   Recent Labs     02/17/25  0500 02/18/25  0432 02/19/25  0452    138 141   K 3.8 3.9 3.8    107 107   CO2 26 26 33*   PHOS 2.6 2.5 2.6   BUN 17 16 14   CREATININE 0.9 0.8 0.8     LIVER PROFILE:   Recent Labs     02/17/25  0500 02/19/25  0452   AST 20 22   ALT 8* 12   BILIDIR 0.2 0.2   BILITOT 0.3 0.3   ALKPHOS 138* 201*       PT/INR:   No results for input(s): \"PROTIME\", \"INR\" in the last 72 hours.    APTT: No results for input(s): \"APTT\" in the last 72 hours.  UA:  No results for input(s): \"NITRITE\", \"COLORU\", \"PHUR\", \"LABCAST\", \"WBCUA\", \"RBCUA\", \"MUCUS\", \"TRICHOMONAS\", \"YEAST\", \"BACTERIA\", \"CLARITYU\", \"SPECGRAV\", \"LEUKOCYTESUR\", \"UROBILINOGEN\", \"BILIRUBINUR\", \"BLOODU\", \"GLUCOSEU\", \"AMORPHOUS\" in the last 72 hours.    Invalid input(s):

## 2025-02-19 NOTE — PROGRESS NOTES
[facility]                            Critical care Progress Note :      CC: COPD respiratory failure    Events of Last 24 hours:   Did not do we;ll with SBT    Propofol  7.5 mcg /kg/min   Precedex 1   22/500/35/5  PEEP 5  FiO2 40%  Minimal secretions   Small BM last night   Ileus/abscess   TPN per surgery  Surgery following   For Ct scan with contrast       Vascular lines: IV: Left IJ     MV:   Vent Mode: AC/VC Resp Rate (Set): 22 bpm/Vt (Set, mL): 500 mL/ /FiO2 : 35 %  Recent Labs     25  1200 25  0458   PHART 7.328* 7.377   QHG3VAF 53.3* 53.4*   PO2ART 98.3 89.5       IV:   PN-Adult Premix  - Standard Electrolytes - Central Line 83 mL/hr at 25 0732    sodium chloride      dexmedeTOMIDine HCl in NaCl 1.2 mcg/kg/hr (25 0904)    propofol 7.5 mcg/kg/min (25 0732)    sodium chloride      dextrose         Vitals:  Blood pressure (!) 135/49, pulse 80, temperature (!) 100.7 °F (38.2 °C), resp. rate 24, height 1.702 m (5' 7.01\"), weight 91.3 kg (201 lb 4.5 oz), SpO2 98%.  on   Temp  Av.6 °F (37 °C)  Min: 96.7 °F (35.9 °C)  Max: 100.9 °F (38.3 °C)    Intake/Output Summary (Last 24 hours) at 2025 1042  Last data filed at 2025 0732  Gross per 24 hour   Intake 3934 ml   Output 2300 ml   Net 1634 ml     EXAM:  General: ill appearing.  Intubated sedated.  Eyes: No sclera icterus. No conjunctival injection.  ENT: No discharge. ET tube in place  Neck: Trachea midline.   Resp: No accessory muscle use. + crackles. No wheezing. + rhonchi.   CV: tachy rate. Regular rhythm. No mumur or rub. No edema.   GI: Non-tender. Non-distended.   Skin: Warm and dry. No rash on exposed extremities.  M/S: No cyanosis. No clubbing.   Neuro: Intubated sedated.  Not following commands.  Psych: Noncommunicative unable to obtain        Scheduled Meds:   insulin lispro  0-4 Units SubCUTAneous Q4H    insulin glargine  15 Units SubCUTAneous  abscess  Surgery following ,CT abdomen   Zongeran on hold ,will ask neurology   Seizure and fall precautions  Rally pack   TPN   Resume Cardizem ,metoprolol on    Increase Lopressor 50 mg BID   Tube feeds - TPN   Blood sugar control ISS, with goal 150-180  GI prophylaxis: PPI  DVT prophylaxis: Lovenox  MRSA prophylaxis: Bactroban  Patient is full code    I discussed care plan with family ,sister ,Raina ,daughter,not available     Care reviewed with nursing staff, medical and surgical specialty care, primary care and the patient's family as available.     Chart review/lab review/X-ray viewing/documentation and had long Conversation with patient/family re: prognosis, care options and any end of life issues:      Critical care time spent reviewing labs/films, examining patient, collaborating with other physicians more than 35  Minutes  excluding procedures ..    Todd Joyce M.D.   2/19/2025  10:42 AM

## 2025-02-19 NOTE — PROGRESS NOTES
Pt minimal events throughout shift. RASS -1/-2. Sedated with proporfol and precedex gtt. Tolerating well. VSS. UOP adequate. NG tube remains connected to LWIS, minimal output.    CT of abdomen completed. Tolerated well. Daughter vickie updated on plan of care.

## 2025-02-19 NOTE — TELEPHONE ENCOUNTER
Refill Request     CONFIRM preferrred pharmacy with the patient.    If Mail Order Rx - Pend for 90 day refill.      Last Seen: Last Seen Department: 1/28/2025  Last Seen by PCP: 1/28/2025    Last Written: 1.2.25    If no future appointment scheduled, route STAFF MESSAGE with patient name to the  Pool for scheduling.      Next Appointment:   Future Appointments   Date Time Provider Department Center   4/15/2025  2:20 PM [facility]       Message sent to  to schedule appt with patient?  N/A      Requested Prescriptions     Pending Prescriptions Disp Refills    atorvastatin (LIPITOR) 40 MG tablet [Pharmacy Med Name: ATORVASTATIN CALCIUM 40MG TABLET] 30 tablet 2     Sig: TAKE ONE (1) TABLET BY MOUTH DAILY

## 2025-02-19 NOTE — PROGRESS NOTES
AM assessment complete, see flowsheet. Medications given per MAR. RASS -2, pt on propofol and precedex for sedation. VSS, IV lines and monitors checked and tightened. NG tube connected to suction, minimal output. No other needs noted, awaiting MD rounds.

## 2025-02-19 NOTE — PLAN OF CARE
Problem: Chronic Conditions and Co-morbidities  Goal: Patient's chronic conditions and co-morbidity symptoms are monitored and maintained or improved  Outcome: Progressing     Problem: Safety - Adult  Goal: Free from fall injury  Outcome: Progressing     Problem: Respiratory - Adult  Goal: Achieves optimal ventilation and oxygenation  Outcome: Progressing     Problem: Cardiovascular - Adult  Goal: Maintains optimal cardiac output and hemodynamic stability  Outcome: Progressing     Problem: Gastrointestinal - Adult  Goal: Maintains or returns to baseline bowel function  Outcome: Progressing     Problem: Nutrition Deficit:  Goal: Optimize nutritional status  Recent Flowsheet Documentation  Taken 2/18/2025 1221 by Luciana Garcia, JOSE ANGEL, LD  Nutrient intake appropriate for improving, restoring, or maintaining nutritional needs:   Assess nutritional status and recommend course of action   Recommend, monitor, and adjust tube feedings and TPN/PPN based on assessed needs

## 2025-02-19 NOTE — PROGRESS NOTES
02/19/25 0758   Patient Observation   Pulse 81   Respirations 30   SpO2 98 %   Breath Sounds   Breath Sounds Bilateral Rhonchi   Vent Information   Equipment Changed HME   Vent Mode AC/VC   Ventilator Settings   FiO2  35 %   Vt (Set, mL) 500 mL   Resp Rate (Set) 22 bpm   PEEP/CPAP (cmH2O) 5   Vent Patient Data (Readings)   Vt (Measured) 460 mL   Peak Inspiratory Pressure (cmH2O) 35 cmH2O   Rate Measured 28 br/min   Minute Volume (L/min) 14.7 Liters   Mean Airway Pressure (cmH2O) 15 cmH20   Plateau Pressure (cm H2O) 21 cm H2O   Driving Pressure 16   I:E Ratio 1:2.10   Static Compliance (L/cm H2O) 28   Dynamic Compliance (L/cm H2O) 15 L/cm H2O   Backup Apnea On   Backup Rate 22 Breaths Per Minute   Backup Vt 500   Vent Alarm Settings   High Pressure (cmH2O) 55 cmH2O   Low Minute Volume (lpm) 4 L/min   High Minute Volume (lpm) 20 L/min   Low Exhaled Vt (ml) 250 mL   High Exhaled Vt (ml) 1000 mL   RR High (bpm) 40 br/min   Apnea (secs) 20 secs   Additional Respiratoray Assessments   Humidification Source HME   Circuit Condensation Drained   Ambu Bag With Mask At Bedside Yes   Airway Clearance   Suction ET Tube   Subglottic Suction Done Yes   Suction Device Inline suction catheter   Sputum Method Obtained Endotracheal   Sputum Amount Moderate   Sputum Color/Odor White   Sputum Consistency Thick   ETT    Placement Date/Time: 02/14/25 1135   Present on Admission/Arrival: No  Placed By: RT  Placement Verified By: Auscultation;Colorimetric ETCO2 device;Chest X-ray  Preoxygenation: Yes  Airway Tube Size: 7.5 mm  Secured At: 24 cm  Measured From: Lips   Secured At 24 cm   Measured From Lips   ETT Placement Left   Secured By Commercial tube quispe   Site Assessment Dry   Cuff Pressure   (MOV)   Tie/Quispe Changed No   Respiratory   Respiratory Interventions Suction - ETT;H.O.B. elevated

## 2025-02-19 NOTE — PROGRESS NOTES
No changes in patient status. Resting quietly and tolerating ventilator support. RASS -2. Urinary catheter draining clear yellow to gravity collection bag. VSS    Electronically signed by Lindsey Booth RN on 2/19/2025 at 7:20 AM

## 2025-02-19 NOTE — PROGRESS NOTES
INPATIENT PROGRESS NOTE        IDENTIFYING DATA/REASON FOR CONSULTATION   PATIENT:  Sandor Early  MRN:  [id number]  ADMIT DATE: 1/30/2025  TIME OF EVALUATION: 2/19/2025 1:44 PM  HOSPITAL STAY:   LOS: 20 days   CONSULTING PHYSICIAN: Drake Jalloh*   REASON FOR CONSULTATION: SBO    Subjective:    Patient seen in follow-up.  Intubated and sedated.      MEDICATIONS   SCHEDULED:  insulin lispro, 0-4 Units, Q4H  insulin glargine, 15 Units, BID  [Held by provider] dilTIAZem, 60 mg, 3 times per day  vancomycin, 1,000 mg, Q12H  [Held by provider] metoprolol tartrate, 50 mg, BID  Gabapentin, 200 mg, 4x Daily  albuterol, 2.5 mg, 4x Daily RT  fluconazole, 200 mg, Q24H  fat emulsion, 250 mL, Once per day on Monday Thursday  sodium chloride flush, 5-40 mL, 2 times per day  piperacillin-tazobactam, 3,375 mg, Q8H  [Held by provider] zonisamide, 100 mg, Daily  thiamine (B-1) 200 mg in sodium chloride 0.9 % 100 mL IVPB, 200 mg, Q24H  pantoprazole (PROTONIX) 40 mg in sodium chloride (PF) 0.9 % 10 mL injection, 40 mg, Daily  enoxaparin, 40 mg, Daily  [Held by provider] allopurinol, 300 mg, Daily  [Held by provider] vitamin D3, 5,000 Units, Daily  [Held by provider] furosemide, 20 mg, Daily  [Held by provider] DULoxetine, 60 mg, Daily  [Held by provider] atorvastatin, 40 mg, Daily      FLUIDS/DRIPS:     PN-Adult Premix 5/20 - Standard Electrolytes - Central Line      PN-Adult Premix 5/20 - Standard Electrolytes - Central Line 83 mL/hr at 02/19/25 0732    sodium chloride      dexmedeTOMIDine HCl in NaCl 1.2 mcg/kg/hr (02/19/25 1252)    propofol 7.5 mcg/kg/min (02/19/25 0732)    sodium chloride      dextrose       PRNs: sodium chloride, , PRN  midazolam, 2 mg, Q1H PRN  fentanNYL, 50 mcg, Q2H PRN  sodium chloride flush, 5-40 mL, PRN  diatrizoate meglumine-sodium, 12 mL, ONCE PRN  [Held by provider] guaiFENesin, 200 mg, Q4H PRN  benzocaine, , 4x Daily PRN  ALPRAZolam, 0.5 mg, ONCE PRN  phenol, 1 spray, Q2H PRN  sodium

## 2025-02-19 NOTE — PROGRESS NOTES
02/18/25 2318   Patient Observation   Pulse 78   Respirations 24   SpO2 98 %   Patient Observations ETT SIZE 7.5, SECURED AT 24 LIP LINE.  AMBU BAG AT HEAD OF BED.  HME CHANGED.   Breath Sounds   Right Upper Lobe Diminished   Right Middle Lobe Diminished   Right Lower Lobe Diminished   Left Upper Lobe Diminished   Left Lower Lobe Diminished   Vent Information   Vent Mode AC/VC   Ventilator Settings   FiO2  35 %   Vt (Set, mL) 500 mL   Resp Rate (Set) 22 bpm   PEEP/CPAP (cmH2O) 5   Vent Patient Data (Readings)   Vt (Measured) 491 mL   Peak Inspiratory Pressure (cmH2O) 38 cmH2O   Rate Measured 24 br/min   Minute Volume (L/min) 12.5 Liters   Peak Inspiratory Flow (lpm) 80 L/sec   Mean Airway Pressure (cmH2O) 14 cmH20   Plateau Pressure (cm H2O) 25 cm H2O   Driving Pressure 20   I:E Ratio 1:2.50   Flow Sensitivity 3 L/min   Vent Alarm Settings   High Pressure (cmH2O) 55 cmH2O   Low Minute Volume (lpm) 4 L/min   High Minute Volume (lpm) 20 L/min   Low Exhaled Vt (ml) 250 mL   High Exhaled Vt (ml) 1000 mL   RR High (bpm) 40 br/min   Apnea (secs) 20 secs   Additional Respiratoray Assessments   Humidification Source HME   Circuit Condensation Drained   Ambu Bag With Mask At Bedside Yes   Airway Clearance   Suction ET Tube   Suction Device Inline suction catheter   Sputum Method Obtained Endotracheal   Sputum Amount Scant   Sputum Color/Odor White   Sputum Consistency Thick   ETT    Placement Date/Time: 02/14/25 1135   Present on Admission/Arrival: No  Placed By: RT  Placement Verified By: Auscultation;Colorimetric ETCO2 device;Chest X-ray  Preoxygenation: Yes  Airway Tube Size: 7.5 mm  Secured At: 24 cm  Measured From: Lips   Secured At 24 cm   Measured From Lips   ETT Placement (S)  Left   Secured By Commercial tube may   Site Assessment Dry

## 2025-02-19 NOTE — PROGRESS NOTES
RT Inhaler-Nebulizer Bronchodilator Protocol Note    There is a bronchodilator order in the chart from a provider indicating to follow the RT Bronchodilator Protocol and there is an “Initiate RT Inhaler-Nebulizer Bronchodilator Protocol” order as well (see protocol at bottom of note).    CXR Findings:  XR CHEST PORTABLE    Result Date: 2/18/2025  No significant interval change.     XR CHEST PORTABLE    Result Date: 2/17/2025  1.  Lines and tubes are stable. 2.  Coarse interstitial markings and interstitial opacities in the lower lungs.  Correlate for bronchiolitis or atypical infection.       The findings from the last RT Protocol Assessment were as follows:   History Pulmonary Disease: Chronic pulmonary disease  Respiratory Pattern: Dyspnea on exertion or RR 21-25 bpm  Breath Sounds: Slightly diminished and/or crackles  Cough: Strong, productive  Indication for Bronchodilator Therapy: Decreased or absent breath sounds  Bronchodilator Assessment Score: 7    Aerosolized bronchodilator medication orders have been revised according to the RT Inhaler-Nebulizer Bronchodilator Protocol below.    Respiratory Therapist to perform RT Therapy Protocol Assessment initially then follow the protocol.  Repeat RT Therapy Protocol Assessment PRN for score 0-3 or on second treatment, BID, and PRN for scores above 3.    No Indications - adjust the frequency to every 6 hours PRN wheezing or bronchospasm, if no treatments needed after 48 hours then discontinue using Per Protocol order mode.     If indication present, adjust the RT bronchodilator orders based on the Bronchodilator Assessment Score as indicated below.  Use Inhaler orders unless patient has one or more of the following: on home nebulizer, not able to hold breath for 10 seconds, is not alert and oriented, cannot activate and use MDI correctly, or respiratory rate 25 breaths per minute or more, then use the equivalent nebulizer order(s) with same Frequency and PRN reasons

## 2025-02-19 NOTE — PROGRESS NOTES
Reassessment completed. Patient continues with ventilator support. He had became agitated and was double stacking his breaths on the ventilator. Increased Propofol to 7.5 mcg/kg.min. After about 15 minutes patient was resting more calm and synchronous with the ventilator.     Electronically signed by Lindsey Booth RN on 2/19/2025 at 12:21 AM

## 2025-02-19 NOTE — PROGRESS NOTES
02/19/25 0337   Patient Observation   Pulse 79   Respirations 23   SpO2 99 %   Patient Observations ETT SIZE 7.5, SECURED AT 24 LIP LINE.  AMBU BAG AT HEAD OF BED.  HME.   Breath Sounds   Right Upper Lobe Diminished   Right Middle Lobe Diminished   Right Lower Lobe Diminished   Left Upper Lobe Diminished   Left Lower Lobe Diminished   Vent Information   Vent Mode AC/VC   Ventilator Settings   FiO2  35 %   Vt (Set, mL) 500 mL   Resp Rate (Set) 22 bpm   PEEP/CPAP (cmH2O) 5   Vent Patient Data (Readings)   Vt (Measured) 500 mL   Peak Inspiratory Pressure (cmH2O) 38 cmH2O   Rate Measured 23 br/min   Minute Volume (L/min) 12 Liters   Peak Inspiratory Flow (lpm) 80 L/sec   Mean Airway Pressure (cmH2O) 13 cmH20   Plateau Pressure (cm H2O) 24 cm H2O   Driving Pressure 19   I:E Ratio 1:3.00   Flow Sensitivity 3 L/min   Vent Alarm Settings   High Pressure (cmH2O) 55 cmH2O   Low Minute Volume (lpm) 4 L/min   High Minute Volume (lpm) 20 L/min   Low Exhaled Vt (ml) 250 mL   High Exhaled Vt (ml) 1000 mL   RR High (bpm) 40 br/min   Apnea (secs) 20 secs   Additional Respiratoray Assessments   Humidification Source HME   Circuit Condensation Drained   Ambu Bag With Mask At Bedside Yes   Airway Clearance   Suction ET Tube   Suction Device Inline suction catheter   Sputum Method Obtained Endotracheal   Sputum Amount Small   Sputum Color/Odor White   Sputum Consistency Thick   ETT    Placement Date/Time: 02/14/25 1135   Present on Admission/Arrival: No  Placed By: RT  Placement Verified By: Auscultation;Colorimetric ETCO2 device;Chest X-ray  Preoxygenation: Yes  Airway Tube Size: 7.5 mm  Secured At: 24 cm  Measured From: Lips   Secured At 24 cm   Measured From Lips   ETT Placement (S)  Right   Secured By Commercial tube may   Site Assessment Dry

## 2025-02-19 NOTE — PROGRESS NOTES
Shift assessment completed, see flow sheet.   RASS -2. Does not follow commands. Responds to painful tactile stimuli. Continuous IV infusion of Precedex and Propofol for sedation.     Intubated and sedated on AC # 7.5  ETT, at 24 cm LL. 22 / 500 / 35 %/ +5.   SpO2 99%. Respirations are easy, even, and unlabored.   Bilateral lung sounds Diminished/Rhonchi.     VSS  OG in place at 60 cm LL to CLWS    CVC/PIV, WNL with all lines in good placement and patent. CHG caps on open ports    All lines and monitoring devices in place. Castrejon is patent and secured. Draining to gravity collection bag. No dependant loops, connected to moveable part of bed and off floor.    Bilateral soft wrist restraints in place for patient safety.  Bed in lowest position with wheels locked. No needs identified at this time. Will continue to monitor.     Electronically signed by Lindsey Booth RN on 2/18/2025 at 10:29 PM

## 2025-02-19 NOTE — PLAN OF CARE
Problem: Chronic Conditions and Co-morbidities  Goal: Patient's chronic conditions and co-morbidity symptoms are monitored and maintained or improved  2/19/2025 0312 by Lindsey Booth RN  Outcome: Progressing  Flowsheets  Taken 2/19/2025 0312  Care Plan - Patient's Chronic Conditions and Co-Morbidity Symptoms are Monitored and Maintained or Improved: Monitor and assess patient's chronic conditions and comorbid symptoms for stability, deterioration, or improvement  Taken 2/18/2025 2000  Care Plan - Patient's Chronic Conditions and Co-Morbidity Symptoms are Monitored and Maintained or Improved: Monitor and assess patient's chronic conditions and comorbid symptoms for stability, deterioration, or improvement  2/18/2025 1942 by Xavi Jeong RN  Outcome: Progressing     Problem: Discharge Planning  Goal: Discharge to home or other facility with appropriate resources  Outcome: Progressing  Flowsheets  Taken 2/19/2025 0312  Discharge to home or other facility with appropriate resources:   Identify barriers to discharge with patient and caregiver   Arrange for needed discharge resources and transportation as appropriate  Taken 2/18/2025 2000  Discharge to home or other facility with appropriate resources:   Identify barriers to discharge with patient and caregiver   Arrange for needed discharge resources and transportation as appropriate     Problem: Safety - Adult  Goal: Free from fall injury  2/19/2025 0312 by Lindsey Booth RN  Outcome: Progressing  Note: Bed exit active  2/18/2025 1942 by Xavi Jeong RN  Outcome: Progressing     Problem: Pain  Goal: Verbalizes/displays adequate comfort level or baseline comfort level  Outcome: Progressing  Flowsheets (Taken 2/19/2025 0312)  Verbalizes/displays adequate comfort level or baseline comfort level: Assess pain using appropriate pain scale  Note: CPOT pain scale     Problem: Respiratory - Adult  Goal: Achieves optimal ventilation and oxygenation  Recent Flowsheet

## 2025-02-20 ENCOUNTER — APPOINTMENT (OUTPATIENT)
Dept: GENERAL RADIOLOGY | Age: 70
DRG: 207 | End: 2025-02-20
Payer: MEDICARE

## 2025-02-20 PROBLEM — G92.8 TOXIC METABOLIC ENCEPHALOPATHY: Status: ACTIVE | Noted: 2025-02-07

## 2025-02-20 LAB
ANION GAP SERPL CALCULATED.3IONS-SCNC: 2 MMOL/L (ref 3–16)
BASE EXCESS BLDA CALC-SCNC: 6.7 MMOL/L (ref -3–3)
BUN SERPL-MCNC: 12 MG/DL (ref 7–20)
CALCIUM SERPL-MCNC: 7.3 MG/DL (ref 8.3–10.6)
CHLORIDE SERPL-SCNC: 101 MMOL/L (ref 99–110)
CO2 BLDA-SCNC: 34.6 MMOL/L
CO2 SERPL-SCNC: 35 MMOL/L (ref 21–32)
COHGB MFR BLDA: 0.3 % (ref 0–1.5)
CREAT SERPL-MCNC: 0.7 MG/DL (ref 0.8–1.3)
DEPRECATED RDW RBC AUTO: 16.2 % (ref 12.4–15.4)
EST. AVERAGE GLUCOSE BLD GHB EST-MCNC: 125.5 MG/DL
GFR SERPLBLD CREATININE-BSD FMLA CKD-EPI: >90 ML/MIN/{1.73_M2}
GLUCOSE BLD-MCNC: 234 MG/DL (ref 70–99)
GLUCOSE BLD-MCNC: 248 MG/DL (ref 70–99)
GLUCOSE BLD-MCNC: 249 MG/DL (ref 70–99)
GLUCOSE BLD-MCNC: 251 MG/DL (ref 70–99)
GLUCOSE BLD-MCNC: 253 MG/DL (ref 70–99)
GLUCOSE BLD-MCNC: 265 MG/DL (ref 70–99)
GLUCOSE SERPL-MCNC: 244 MG/DL (ref 70–99)
HBA1C MFR BLD: 6 %
HCO3 BLDA-SCNC: 32.8 MMOL/L (ref 21–29)
HCT VFR BLD AUTO: 23.1 % (ref 40.5–52.5)
HGB BLD-MCNC: 7.7 G/DL (ref 13.5–17.5)
HGB BLDA-MCNC: 8.3 G/DL (ref 13.5–17.5)
MAGNESIUM SERPL-MCNC: 1.6 MG/DL (ref 1.8–2.4)
MCH RBC QN AUTO: 30.7 PG (ref 26–34)
MCHC RBC AUTO-ENTMCNC: 33.2 G/DL (ref 31–36)
MCV RBC AUTO: 92.5 FL (ref 80–100)
METHGB MFR BLDA: 0 %
O2 THERAPY: ABNORMAL
PCO2 BLDA: 57.2 MMHG (ref 35–45)
PERFORMED ON: ABNORMAL
PH BLDA: 7.38 [PH] (ref 7.35–7.45)
PHOSPHATE SERPL-MCNC: 3 MG/DL (ref 2.5–4.9)
PLATELET # BLD AUTO: 261 K/UL (ref 135–450)
PMV BLD AUTO: 7.8 FL (ref 5–10.5)
PO2 BLDA: 89.6 MMHG (ref 75–108)
POTASSIUM SERPL-SCNC: 3.4 MMOL/L (ref 3.5–5.1)
RBC # BLD AUTO: 2.49 M/UL (ref 4.2–5.9)
SAO2 % BLDA: 96.5 %
SODIUM SERPL-SCNC: 138 MMOL/L (ref 136–145)
WBC # BLD AUTO: 3.5 K/UL (ref 4–11)

## 2025-02-20 PROCEDURE — 2500000003 HC RX 250 WO HCPCS: Performed by: INTERNAL MEDICINE

## 2025-02-20 PROCEDURE — 2700000000 HC OXYGEN THERAPY PER DAY

## 2025-02-20 PROCEDURE — 6360000002 HC RX W HCPCS: Performed by: INTERNAL MEDICINE

## 2025-02-20 PROCEDURE — 6370000000 HC RX 637 (ALT 250 FOR IP): Performed by: INTERNAL MEDICINE

## 2025-02-20 PROCEDURE — 83735 ASSAY OF MAGNESIUM: CPT

## 2025-02-20 PROCEDURE — 2580000003 HC RX 258: Performed by: INTERNAL MEDICINE

## 2025-02-20 PROCEDURE — 71045 X-RAY EXAM CHEST 1 VIEW: CPT

## 2025-02-20 PROCEDURE — 94003 VENT MGMT INPAT SUBQ DAY: CPT

## 2025-02-20 PROCEDURE — 94640 AIRWAY INHALATION TREATMENT: CPT

## 2025-02-20 PROCEDURE — 83036 HEMOGLOBIN GLYCOSYLATED A1C: CPT

## 2025-02-20 PROCEDURE — 94761 N-INVAS EAR/PLS OXIMETRY MLT: CPT

## 2025-02-20 PROCEDURE — 6360000002 HC RX W HCPCS: Performed by: STUDENT IN AN ORGANIZED HEALTH CARE EDUCATION/TRAINING PROGRAM

## 2025-02-20 PROCEDURE — 6370000000 HC RX 637 (ALT 250 FOR IP): Performed by: STUDENT IN AN ORGANIZED HEALTH CARE EDUCATION/TRAINING PROGRAM

## 2025-02-20 PROCEDURE — 82803 BLOOD GASES ANY COMBINATION: CPT

## 2025-02-20 PROCEDURE — 99291 CRITICAL CARE FIRST HOUR: CPT | Performed by: INTERNAL MEDICINE

## 2025-02-20 PROCEDURE — 99232 SBSQ HOSP IP/OBS MODERATE 35: CPT | Performed by: SURGERY

## 2025-02-20 PROCEDURE — 80048 BASIC METABOLIC PNL TOTAL CA: CPT

## 2025-02-20 PROCEDURE — 2500000003 HC RX 250 WO HCPCS: Performed by: STUDENT IN AN ORGANIZED HEALTH CARE EDUCATION/TRAINING PROGRAM

## 2025-02-20 PROCEDURE — 2000000000 HC ICU R&B

## 2025-02-20 PROCEDURE — 84100 ASSAY OF PHOSPHORUS: CPT

## 2025-02-20 PROCEDURE — APPSS15 APP SPLIT SHARED TIME 0-15 MINUTES

## 2025-02-20 PROCEDURE — 99232 SBSQ HOSP IP/OBS MODERATE 35: CPT | Performed by: STUDENT IN AN ORGANIZED HEALTH CARE EDUCATION/TRAINING PROGRAM

## 2025-02-20 PROCEDURE — 85027 COMPLETE CBC AUTOMATED: CPT

## 2025-02-20 PROCEDURE — 2500000003 HC RX 250 WO HCPCS: Performed by: SURGERY

## 2025-02-20 PROCEDURE — 6370000000 HC RX 637 (ALT 250 FOR IP): Performed by: SURGERY

## 2025-02-20 PROCEDURE — 99233 SBSQ HOSP IP/OBS HIGH 50: CPT | Performed by: INTERNAL MEDICINE

## 2025-02-20 PROCEDURE — 6370000000 HC RX 637 (ALT 250 FOR IP)

## 2025-02-20 RX ORDER — LEVETIRACETAM 500 MG/5ML
750 INJECTION, SOLUTION, CONCENTRATE INTRAVENOUS 2 TIMES DAILY
Status: DISCONTINUED | OUTPATIENT
Start: 2025-02-20 | End: 2025-02-26

## 2025-02-20 RX ORDER — INSULIN LISPRO 100 [IU]/ML
0-16 INJECTION, SOLUTION INTRAVENOUS; SUBCUTANEOUS EVERY 4 HOURS
Status: DISCONTINUED | OUTPATIENT
Start: 2025-02-20 | End: 2025-03-25 | Stop reason: HOSPADM

## 2025-02-20 RX ORDER — INSULIN GLARGINE 100 [IU]/ML
20 INJECTION, SOLUTION SUBCUTANEOUS 2 TIMES DAILY
Status: DISCONTINUED | OUTPATIENT
Start: 2025-02-20 | End: 2025-02-27

## 2025-02-20 RX ORDER — POLYETHYLENE GLYCOL 3350 17 G/17G
17 POWDER, FOR SOLUTION ORAL ONCE
Status: COMPLETED | OUTPATIENT
Start: 2025-02-20 | End: 2025-02-20

## 2025-02-20 RX ADMIN — DEXMEDETOMIDINE HYDROCHLORIDE 1.2 MCG/KG/HR: 4 INJECTION, SOLUTION INTRAVENOUS at 07:37

## 2025-02-20 RX ADMIN — SODIUM CHLORIDE, PRESERVATIVE FREE 10 ML: 5 INJECTION INTRAVENOUS at 22:00

## 2025-02-20 RX ADMIN — DEXMEDETOMIDINE HYDROCHLORIDE 1.4 MCG/KG/HR: 4 INJECTION, SOLUTION INTRAVENOUS at 13:42

## 2025-02-20 RX ADMIN — LEVETIRACETAM 750 MG: 100 INJECTION INTRAVENOUS at 13:39

## 2025-02-20 RX ADMIN — ALBUTEROL SULFATE 2.5 MG: 2.5 SOLUTION RESPIRATORY (INHALATION) at 08:12

## 2025-02-20 RX ADMIN — DEXMEDETOMIDINE HYDROCHLORIDE 1.4 MCG/KG/HR: 4 INJECTION, SOLUTION INTRAVENOUS at 17:13

## 2025-02-20 RX ADMIN — INSULIN LISPRO 1 UNITS: 100 INJECTION, SOLUTION INTRAVENOUS; SUBCUTANEOUS at 06:02

## 2025-02-20 RX ADMIN — PIPERACILLIN AND TAZOBACTAM 3375 MG: 3; .375 INJECTION, POWDER, LYOPHILIZED, FOR SOLUTION INTRAVENOUS at 08:45

## 2025-02-20 RX ADMIN — PROPOFOL 5 MCG/KG/MIN: 10 INJECTION, EMULSION INTRAVENOUS at 11:39

## 2025-02-20 RX ADMIN — ASCORBIC ACID, VITAMIN A PALMITATE, CHOLECALCIFEROL, THIAMINE HYDROCHLORIDE, RIBOFLAVIN-5 PHOSPHATE SODIUM, PYRIDOXINE HYDROCHLORIDE, NIACINAMIDE, DEXPANTHENOL, ALPHA-TOCOPHEROL ACETATE, VITAMIN K1, FOLIC ACID, BIOTIN, CYANOCOBALAMIN: 200; 3300; 200; 6; 3.6; 6; 40; 15; 10; 150; 600; 60; 5 INJECTION, SOLUTION INTRAVENOUS at 17:30

## 2025-02-20 RX ADMIN — INSULIN GLARGINE 20 UNITS: 100 INJECTION, SOLUTION SUBCUTANEOUS at 21:35

## 2025-02-20 RX ADMIN — INSULIN LISPRO 2 UNITS: 100 INJECTION, SOLUTION INTRAVENOUS; SUBCUTANEOUS at 08:37

## 2025-02-20 RX ADMIN — DEXMEDETOMIDINE HYDROCHLORIDE 1.4 MCG/KG/HR: 4 INJECTION, SOLUTION INTRAVENOUS at 11:19

## 2025-02-20 RX ADMIN — GABAPENTIN 200 MG: 250 SOLUTION ORAL at 08:37

## 2025-02-20 RX ADMIN — DEXMEDETOMIDINE HYDROCHLORIDE 1.2 MCG/KG/HR: 4 INJECTION, SOLUTION INTRAVENOUS at 00:04

## 2025-02-20 RX ADMIN — INSULIN LISPRO 8 UNITS: 100 INJECTION, SOLUTION INTRAVENOUS; SUBCUTANEOUS at 12:42

## 2025-02-20 RX ADMIN — INSULIN LISPRO 4 UNITS: 100 INJECTION, SOLUTION INTRAVENOUS; SUBCUTANEOUS at 17:36

## 2025-02-20 RX ADMIN — GLYCERIN 2 G: 2 SUPPOSITORY RECTAL at 10:25

## 2025-02-20 RX ADMIN — POTASSIUM CHLORIDE 20 MEQ: 400 INJECTION, SOLUTION INTRAVENOUS at 09:16

## 2025-02-20 RX ADMIN — DEXMEDETOMIDINE HYDROCHLORIDE 1.2 MCG/KG/HR: 4 INJECTION, SOLUTION INTRAVENOUS at 03:52

## 2025-02-20 RX ADMIN — ALBUTEROL SULFATE 2.5 MG: 2.5 SOLUTION RESPIRATORY (INHALATION) at 15:41

## 2025-02-20 RX ADMIN — POTASSIUM CHLORIDE 20 MEQ: 400 INJECTION, SOLUTION INTRAVENOUS at 08:16

## 2025-02-20 RX ADMIN — LEVETIRACETAM 750 MG: 100 INJECTION INTRAVENOUS at 21:28

## 2025-02-20 RX ADMIN — SODIUM CHLORIDE 1000 MG: 9 INJECTION, SOLUTION INTRAVENOUS at 05:41

## 2025-02-20 RX ADMIN — I.V. FAT EMULSION 250 ML: 20 EMULSION INTRAVENOUS at 17:24

## 2025-02-20 RX ADMIN — GABAPENTIN 200 MG: 250 SOLUTION ORAL at 21:41

## 2025-02-20 RX ADMIN — THIAMINE HYDROCHLORIDE 200 MG: 100 INJECTION, SOLUTION INTRAMUSCULAR; INTRAVENOUS at 11:29

## 2025-02-20 RX ADMIN — POLYETHYLENE GLYCOL (3350) 17 G: 17 POWDER, FOR SOLUTION ORAL at 10:25

## 2025-02-20 RX ADMIN — SODIUM CHLORIDE, PRESERVATIVE FREE 10 ML: 5 INJECTION INTRAVENOUS at 08:27

## 2025-02-20 RX ADMIN — GABAPENTIN 200 MG: 250 SOLUTION ORAL at 17:11

## 2025-02-20 RX ADMIN — INSULIN LISPRO 1 UNITS: 100 INJECTION, SOLUTION INTRAVENOUS; SUBCUTANEOUS at 21:34

## 2025-02-20 RX ADMIN — GABAPENTIN 200 MG: 250 SOLUTION ORAL at 12:43

## 2025-02-20 RX ADMIN — DEXMEDETOMIDINE HYDROCHLORIDE 1.4 MCG/KG/HR: 4 INJECTION, SOLUTION INTRAVENOUS at 20:52

## 2025-02-20 RX ADMIN — SODIUM CHLORIDE 1000 MG: 9 INJECTION, SOLUTION INTRAVENOUS at 17:13

## 2025-02-20 RX ADMIN — FLUCONAZOLE 200 MG: 2 INJECTION, SOLUTION INTRAVENOUS at 10:28

## 2025-02-20 RX ADMIN — INSULIN GLARGINE 15 UNITS: 100 INJECTION, SOLUTION SUBCUTANEOUS at 08:37

## 2025-02-20 RX ADMIN — ENOXAPARIN SODIUM 40 MG: 100 INJECTION SUBCUTANEOUS at 08:26

## 2025-02-20 RX ADMIN — PIPERACILLIN AND TAZOBACTAM 3375 MG: 3; .375 INJECTION, POWDER, LYOPHILIZED, FOR SOLUTION INTRAVENOUS at 03:26

## 2025-02-20 RX ADMIN — ALBUTEROL SULFATE 2.5 MG: 2.5 SOLUTION RESPIRATORY (INHALATION) at 11:21

## 2025-02-20 RX ADMIN — MAGNESIUM SULFATE HEPTAHYDRATE 2000 MG: 40 INJECTION, SOLUTION INTRAVENOUS at 08:03

## 2025-02-20 RX ADMIN — PANTOPRAZOLE SODIUM 40 MG: 40 INJECTION, POWDER, LYOPHILIZED, FOR SOLUTION INTRAVENOUS at 08:27

## 2025-02-20 RX ADMIN — ALBUTEROL SULFATE 2.5 MG: 2.5 SOLUTION RESPIRATORY (INHALATION) at 20:22

## 2025-02-20 ASSESSMENT — PULMONARY FUNCTION TESTS
PIF_VALUE: 34
PIF_VALUE: 34
PIF_VALUE: 37
PIF_VALUE: 32
PIF_VALUE: 46
PIF_VALUE: 41
PIF_VALUE: 43
PIF_VALUE: 39
PIF_VALUE: 34
PIF_VALUE: 37
PIF_VALUE: 44
PIF_VALUE: 34
PIF_VALUE: 39
PIF_VALUE: 35
PIF_VALUE: 45
PIF_VALUE: 40
PIF_VALUE: 39
PIF_VALUE: 38
PIF_VALUE: 42
PIF_VALUE: 42

## 2025-02-20 NOTE — PROGRESS NOTES
General Surgery  Daily Progress Note    Pt Name: Sandor Early  Medical Record Number: [id number]  Date of Birth 1955   Today's Date: 2/20/2025  Admit date: 1/30/2025  LOS: Day 21    SUBJECTIVE  Intubated and sedated.      OBJECTIVE  Vitals:    02/20/25 0400 02/20/25 0700 02/20/25 0800 02/20/25 0900   BP: (!) 122/48 (!) 128/54 (!) 118/53 (!) 126/56   Pulse: 67 66 70 76   Resp: 20 20 22 23   Temp: 99.7 °F (37.6 °C) 98.2 °F (36.8 °C)     TempSrc: Bladder Bladder     SpO2: 98% 100% 98% 97%   Weight: 92.3 kg (203 lb 7.8 oz)      Height:           Gen: Sedated  Resp: Intubated.  CV: Regular rate. Regular rhythm.   GI: No pain response with palpation. +Softly distended, improving from prior exams. +Hypoactive.   Skin: Warm and dry. No nodule or rash on exposed extremities.       I/O last 3 completed shifts:  In: 5065.1 [I.V.:1055.1; IV Piggyback:1198.7]  Out: 4225 [Urine:4025; Emesis/NG output:200]  I/O this shift:  In: 877.7 [I.V.:122.9; NG/GT:45; IV Piggyback:380.1]  Out: 435 [Urine:335; Emesis/NG output:100]    LABS  CBC:   Recent Labs     02/18/25  0432 02/19/25  0452 02/20/25  0550   WBC 2.9* 3.2* 3.5*   HGB 7.9* 7.8* 7.7*   HCT 24.0* 23.4* 23.1*   MCV 94.6 92.5 92.5    239 261     BMP:   Recent Labs     02/18/25  0432 02/19/25  0452 02/20/25  0550    141 138   K 3.9 3.8 3.4*    107 101   CO2 26 33* 35*   PHOS 2.5 2.6 3.0   BUN 16 14 12   CREATININE 0.8 0.8 0.7*     LIVER PROFILE:   Recent Labs     02/19/25  0452   AST 22   ALT 12   BILIDIR 0.2   BILITOT 0.3   ALKPHOS 201*       PT/INR:   No results for input(s): \"PROTIME\", \"INR\" in the last 72 hours.    APTT: No results for input(s): \"APTT\" in the last 72 hours.  UA:  No results for input(s): \"NITRITE\", \"COLORU\", \"PHUR\", \"LABCAST\", \"WBCUA\", \"RBCUA\", \"MUCUS\", \"TRICHOMONAS\", \"YEAST\", \"BACTERIA\", \"CLARITYU\", \"SPECGRAV\", \"LEUKOCYTESUR\", \"UROBILINOGEN\", \"BILIRUBINUR\", \"BLOODU\", \"GLUCOSEU\", \"AMORPHOUS\" in the last 72 hours.    Invalid input(s):

## 2025-02-20 NOTE — PROGRESS NOTES
INPATIENT PROGRESS NOTE        IDENTIFYING DATA/REASON FOR CONSULTATION   PATIENT:  Sandor Early  MRN:  [id number]  ADMIT DATE: 1/30/2025  TIME OF EVALUATION: 2/20/2025 10:49 AM  HOSPITAL STAY:   LOS: 21 days   CONSULTING PHYSICIAN: Drake Jalloh*   REASON FOR CONSULTATION: SBO    Subjective:    Patient seen in follow-up.  Intubated and sedated.    MEDICATIONS   SCHEDULED:  insulin lispro, 0-4 Units, Q4H  insulin glargine, 15 Units, BID  [Held by provider] dilTIAZem, 60 mg, 3 times per day  vancomycin, 1,000 mg, Q12H  [Held by provider] metoprolol tartrate, 50 mg, BID  Gabapentin, 200 mg, 4x Daily  albuterol, 2.5 mg, 4x Daily RT  fluconazole, 200 mg, Q24H  fat emulsion, 250 mL, Once per day on Monday Thursday  sodium chloride flush, 5-40 mL, 2 times per day  piperacillin-tazobactam, 3,375 mg, Q8H  [Held by provider] zonisamide, 100 mg, Daily  thiamine (B-1) 200 mg in sodium chloride 0.9 % 100 mL IVPB, 200 mg, Q24H  pantoprazole (PROTONIX) 40 mg in sodium chloride (PF) 0.9 % 10 mL injection, 40 mg, Daily  enoxaparin, 40 mg, Daily  [Held by provider] allopurinol, 300 mg, Daily  [Held by provider] vitamin D3, 5,000 Units, Daily  [Held by provider] furosemide, 20 mg, Daily  [Held by provider] DULoxetine, 60 mg, Daily  [Held by provider] atorvastatin, 40 mg, Daily      FLUIDS/DRIPS:     PN-Adult Premix 5/20 - Standard Electrolytes - Central Line 83 mL/hr at 02/20/25 1018    sodium chloride      dexmedeTOMIDine HCl in NaCl 1.4 mcg/kg/hr (02/20/25 1018)    propofol Stopped (02/20/25 0917)    sodium chloride      dextrose       PRNs: sodium chloride, , PRN  midazolam, 2 mg, Q1H PRN  fentanNYL, 50 mcg, Q2H PRN  sodium chloride flush, 5-40 mL, PRN  diatrizoate meglumine-sodium, 12 mL, ONCE PRN  [Held by provider] guaiFENesin, 200 mg, Q4H PRN  benzocaine, , 4x Daily PRN  ALPRAZolam, 0.5 mg, ONCE PRN  phenol, 1 spray, Q2H PRN  sodium chloride, , PRN  ondansetron, 4 mg, Q8H PRN   Or  ondansetron, 4 mg, Q6H

## 2025-02-20 NOTE — PROGRESS NOTES
Reassessment completed, see flowsheets, unchanged from prior. VSS.   Pt peak pressuring. Restart propofol 5 mcg/kg/min, continued precedex 1.4 mcg/kg/hr.     All ICU Lines and monitoring remain in place. Bed locked in lowest position. Call light within reach.

## 2025-02-20 NOTE — PROGRESS NOTES
02/19/25 2303   Patient Observation   Pulse 75   Respirations 21   SpO2 97 %   Patient Observations ETT 7.5/ 24@lips   Breath Sounds   Respiratory Pattern Regular   Right Upper Lobe Rhonchi   Right Middle Lobe Rhonchi   Right Lower Lobe Rhonchi   Left Upper Lobe Rhonchi   Left Lower Lobe Rhonchi   Vent Information   Equipment Changed HME   Vent Mode AC/VC   Ventilator Settings   FiO2  35 %   Vt (Set, mL) 500 mL   Resp Rate (Set) 18 bpm   PEEP/CPAP (cmH2O) 5   Vent Patient Data (Readings)   Vt (Measured) 543 mL   Peak Inspiratory Pressure (cmH2O) 44 cmH2O   Rate Measured 23 br/min   Minute Volume (L/min) 11.4 Liters   Peak Inspiratory Flow (lpm) 80 L/sec   Mean Airway Pressure (cmH2O) 12 cmH20   Plateau Pressure (cm H2O) 25 cm H2O   Driving Pressure 20   I:E Ratio 1:3.00   Flow Sensitivity 3 L/min   Backup Apnea On   Backup Rate 18 Breaths Per Minute   Backup Vt 500   Vent Alarm Settings   High Pressure (cmH2O) 55 cmH2O   Low Minute Volume (lpm) 4 L/min   High Minute Volume (lpm) 20 L/min   Low Exhaled Vt (ml) 250 mL   High Exhaled Vt (ml) 1000 mL   RR High (bpm) 40 br/min   Apnea (secs) 20 secs   Additional Respiratoray Assessments   Humidification Source HME   Ambu Bag With Mask At Bedside Yes   Airway Clearance   Suction ET Tube   Suction Device Inline suction catheter   Sputum Method Obtained Endotracheal   Sputum Amount Moderate   Sputum Color/Odor Clear   Sputum Consistency Thick   ETT    Placement Date/Time: 02/14/25 1135   Present on Admission/Arrival: No  Placed By: RT  Placement Verified By: Auscultation;Colorimetric ETCO2 device;Chest X-ray  Preoxygenation: Yes  Airway Tube Size: 7.5 mm  Secured At: 24 cm  Measured From: Lips   Secured At 24 cm   Measured From Lips   ETT Placement Center  (moved by RT)   Secured By Commercial tube quispe   Site Assessment Dry   Cuff Pressure   (mov)   Tie/Quispe Changed No

## 2025-02-20 NOTE — PROGRESS NOTES
Shift assessment, completed, see flow sheet. Pt RASS -1, not following commands. Will open eyes to voice and pain.    Intubated and sedated with a # 7.5 ETT, at 25 LL. On AC 18 / 500 /35 %/ 5. SR 70, /53 (72), SpO2 98%. Respirations are easy, even, and unlabored. Bilateral lung sounds diminished. L nare NG in place at 60, CLWS, with bile/brown output.  Hypoactive bowel sounds.    L IJ CVC, WNL with TPN 83 ml/hr, propofol 7.5 mcg/kg/min, precedex 1.4 mcg/kg/hr infusing. 3 PIVs, WNL, with zosyn infusing.     Castrejon in place and patent with STAT lock. Bilateral soft wrist restraints in place for pt safety.     Call light within reach. Bed in lowest position. Bed alarm on.

## 2025-02-20 NOTE — PLAN OF CARE
Problem: Chronic Conditions and Co-morbidities  Goal: Patient's chronic conditions and co-morbidity symptoms are monitored and maintained or improved  Outcome: Not Progressing  Flowsheets (Taken 2/20/2025 1315)  Care Plan - Patient's Chronic Conditions and Co-Morbidity Symptoms are Monitored and Maintained or Improved:   Monitor and assess patient's chronic conditions and comorbid symptoms for stability, deterioration, or improvement   Collaborate with multidisciplinary team to address chronic and comorbid conditions and prevent exacerbation or deterioration     Problem: Respiratory - Adult  Goal: Achieves optimal ventilation and oxygenation  Outcome: Not Progressing  Flowsheets (Taken 2/20/2025 1315)  Achieves optimal ventilation and oxygenation:   Assess for changes in respiratory status   Assess for changes in mentation and behavior   Position to facilitate oxygenation and minimize respiratory effort   Oxygen supplementation based on oxygen saturation or arterial blood gases   Assess the need for suctioning and aspirate as needed     Problem: Infection - Adult  Goal: Absence of infection at discharge  Outcome: Not Progressing  Flowsheets (Taken 2/20/2025 1315)  Absence of infection at discharge:   Assess and monitor for signs and symptoms of infection   Monitor lab/diagnostic results   Monitor all insertion sites i.e., indwelling lines, tubes and drains   Monitor endotracheal (as able) and nasal secretions for changes in amount and color   Administer medications as ordered     Problem: Metabolic/Fluid and Electrolytes - Adult  Goal: Glucose maintained within prescribed range  Outcome: Not Progressing  Flowsheets (Taken 2/20/2025 1315)  Glucose maintained within prescribed range:   Monitor blood glucose as ordered   Assess for signs and symptoms of hyperglycemia and hypoglycemia   Administer ordered medications to maintain glucose within target range   Assess barriers to adequate nutritional intake and initiate

## 2025-02-20 NOTE — PROGRESS NOTES
Restart propofol 7.5 mcg/kg/min, increased precedex 1.4  mcg/kg/hr. Once pt settles down will try to turn off propofol

## 2025-02-20 NOTE — PROGRESS NOTES
TPN: Day 7  Electrolytes reviewed:  K 3.4 (40 meq rider given)  Mag 1.60 ( Mag 2gm times one given)  Trophic Feed to start today  BG remains high, 30 units insulin in TPN  Changed to high dose  Continue with 83ml/hr, will wean TPN once TF at goal    Recent Labs     02/18/25  0432 02/18/25  1200 02/19/25  0452 02/19/25  0458 02/20/25  0550     --  141  --  138   K 3.9  --  3.8  --  3.4*     --  107  --  101   CO2 26  --  33*  --  35*   BUN 16  --  14  --  12   GLUCOSE 283*  --  255*  --  244*   CALCIUM 7.1*  --  7.4*  --  7.3*   MG 1.62*  --  1.82  --  1.60*   PHOS 2.5  --  2.6  --  3.0   TRIG  --   --  147  --   --    WBC 2.9*  --  3.2*  --  3.5*   PHART 7.298*   < >  --    < > 7.377    < > = values in this interval not displayed.       Sabiha Cid, [facility] 2/20/2025 1:10 PM

## 2025-02-20 NOTE — PROGRESS NOTES
RT Inhaler-Nebulizer Bronchodilator Protocol Note    There is a bronchodilator order in the chart from a provider indicating to follow the RT Bronchodilator Protocol and there is an “Initiate RT Inhaler-Nebulizer Bronchodilator Protocol” order as well (see protocol at bottom of note).    CXR Findings:  XR CHEST PORTABLE    Result Date: 2/18/2025  No significant interval change.       The findings from the last RT Protocol Assessment were as follows:   History Pulmonary Disease: Chronic pulmonary disease  Respiratory Pattern: Dyspnea on exertion or RR 21-25 bpm  Breath Sounds: Inspiratory and expiratory or bilateral wheezing and/or rhonchi  Cough: Strong, productive  Indication for Bronchodilator Therapy: Decreased or absent breath sounds  Bronchodilator Assessment Score: 11    Aerosolized bronchodilator medication orders have been revised according to the RT Inhaler-Nebulizer Bronchodilator Protocol below.    Respiratory Therapist to perform RT Therapy Protocol Assessment initially then follow the protocol.  Repeat RT Therapy Protocol Assessment PRN for score 0-3 or on second treatment, BID, and PRN for scores above 3.    No Indications - adjust the frequency to every 6 hours PRN wheezing or bronchospasm, if no treatments needed after 48 hours then discontinue using Per Protocol order mode.     If indication present, adjust the RT bronchodilator orders based on the Bronchodilator Assessment Score as indicated below.  Use Inhaler orders unless patient has one or more of the following: on home nebulizer, not able to hold breath for 10 seconds, is not alert and oriented, cannot activate and use MDI correctly, or respiratory rate 25 breaths per minute or more, then use the equivalent nebulizer order(s) with same Frequency and PRN reasons based on the score.  If a patient is on this medication at home then do not decrease Frequency below that used at home.    0-3 - enter or revise RT bronchodilator order(s) to equivalent  RT Bronchodilator order with Frequency of every 4 hours PRN for wheezing or increased work of breathing using Per Protocol order mode.        4-6 - enter or revise RT Bronchodilator order(s) to two equivalent RT bronchodilator orders with one order with BID Frequency and one order with Frequency of every 4 hours PRN wheezing or increased work of breathing using Per Protocol order mode.        7-10 - enter or revise RT Bronchodilator order(s) to two equivalent RT bronchodilator orders with one order with TID Frequency and one order with Frequency of every 4 hours PRN wheezing or increased work of breathing using Per Protocol order mode.       11-13 - enter or revise RT Bronchodilator order(s) to one equivalent RT bronchodilator order with QID Frequency and an Albuterol order with Frequency of every 4 hours PRN wheezing or increased work of breathing using Per Protocol order mode.      Greater than 13 - enter or revise RT Bronchodilator order(s) to one equivalent RT bronchodilator order with every 4 hours Frequency and an Albuterol order with Frequency of every 2 hours PRN wheezing or increased work of breathing using Per Protocol order mode.         Electronically signed by Padmini Rojas RCP on 2/19/2025 at 7:41 PM

## 2025-02-20 NOTE — PROGRESS NOTES
Care rounds completed with Dr. Joyce and multidisciplinary team. Reviewed labs, meds, VS, assessment, & plan of care for today. See dictated note and new orders for details.     Change to high dose SSI  Increase lantus to 20 units BID  DC zosyn and diflucan  Potential bronch on Friday 2/21  Neurology consult to manage zonisamide  Dr. Joyce updated daughter at bedside.

## 2025-02-20 NOTE — PROGRESS NOTES
Patient is not able to demonstrated the ability to move from a reclining position to an upright position within the recliner. Patient is confused, demented and /or unable to follow instruction.     4 Eyes Skin Assessment     NAME:  Sandor Early  YOB: 1955  MEDICAL RECORD NUMBER:  [id number]    The patient is being assessed for  Shift Handoff    I agree that at least one RN has performed a thorough Head to Toe Skin Assessment on the patient. ALL assessment sites listed below have been assessed.      Areas assessed by both nurses:    Head, Face, Ears, Shoulders, Back, Chest, Arms, Elbows, Hands, Sacrum. Buttock, Coccyx, Ischium, Legs. Feet and Heels, and Under Medical Devices         Does the Patient have a Wound? No noted wound(s)       Abraham Prevention initiated by RN: Yes  Wound Care Orders initiated by RN: No    Pressure Injury (Stage 3,4, Unstageable, DTI, NWPT, and Complex wounds) if present, place Wound referral order by RN under : No    New Ostomies, if present place, Ostomy referral order under : No     Nurse 1 eSignature: Electronically signed by Lindsey Booth RN on 2/20/25 at 6:21 AM EST    **SHARE this note so that the co-signing nurse can place an eSignature**    Nurse 2 eSignature: Electronically signed by Maile Diego RN on 2/20/25 at 7:02 AM EST

## 2025-02-20 NOTE — PROGRESS NOTES
No changes in patient assessment. Continues to tolerate bipap well. Spo2 100%. Sedation titrated per Precedex and propofol, RASS -2.    Castrejon collection bag leaking on corner of meter box. Cleaned connection port with CHG swab. Removed and replaced with new collection bag.    Patient opens eyes with strong tactile stimuli but is not interactive.    All lines and tubes are in good position and patent.    Electronically signed by Lindsey Booth RN on 2/20/2025 at 6:30 AM

## 2025-02-20 NOTE — PROGRESS NOTES
Shift assessment completed, see flow sheet.   RASS -2. Does not follow commands. Responds to painful tactile stimuli. Continuous IV infusion of Precedex and Propofol for sedation.      Intubated and sedated on AC # 7.5  ETT, at 24 cm LL. 22 / 500 / 35 %/ +5.   SpO2 99%. Respirations are easy, even, and unlabored.   Bilateral lung sounds Diminished/Rhonchi.      VSS  OG in place at 60 cm LL to CLWS. Clamped after medication administration.     CVC/PIV, WNL with all lines in good placement and patent. CHG caps on open ports     All lines and monitoring devices in place. Castrejon is patent and secured. Draining to gravity collection bag. No dependant loops, connected to moveable part of bed and off floor.     Bilateral soft wrist restraints in place for patient safety.  Bed in lowest position with wheels locked. No needs identified at this time. Will continue to monitor.     Electronically signed by Lindsey Booth RN on 2/20/2025 at 12:18 AM

## 2025-02-20 NOTE — PROGRESS NOTES
Patient to ps trial at this time, 5/5, patient rate from 22 to 31, rsbi 103, patient back to a/c with previous settings

## 2025-02-20 NOTE — PROGRESS NOTES
02/20/25 1400   RT Protocol   History Pulmonary Disease 2   Respiratory pattern 2   Breath sounds 2   Cough 2   Indications for Bronchodilator Therapy Decreased or absent breath sounds   Bronchodilator Assessment Score 8

## 2025-02-20 NOTE — PROGRESS NOTES
Progress Note    Admit Date:  1/30/2025    Interval history:  Influenza A and Acute respiratory failure   Was on bipap in iCU and improved, transferred to [facility]        Has NG placed for Ileus  , he pulled out NG and likely has aspirated became hypoxic and transferred to ICU      Seizure like event 2/7,   Remains confused  MRI brain incomplete .     2/13   Mentation worse today  On precedex   On 4 L of O2     2/20      Subjective:  Mr. Early seen sedated on vent no distress    No BM , abd less distended, no NG output , no BM  Repeat CT abd with no acute findings     Started weaning sedation   Sugars high with TPN      Objective:   Patient Vitals for the past 4 hrs:   BP Temp Temp src Pulse Resp SpO2 Weight   02/20/25 0400 (!) 122/48 99.7 °F (37.6 °C) Bladder 67 20 98 % 92.3 kg (203 lb 7.8 oz)          Intake/Output Summary (Last 24 hours) at 2/20/2025 0710  Last data filed at 2/20/2025 0450  Gross per 24 hour   Intake 4062.4 ml   Output 3475 ml   Net 587.4 ml       Physical Exam:        General:  elderly male sedated on vent  Oral ETT and OG noted  Left IJ TLC . Appears to be not in any distress  Mucous Membranes:  Pink , anicteric  Neck: No JVD, no carotid bruit, no thyromegaly  Chest:  Clear to auscultation bilaterally, resolved wheeze  Cardiovascular:  RRR S1S2 heard, no murmurs or gallops  Abdomen:  Soft, obese +distended, non tender, no organomegaly, BS absent  Extremities: trace edema to ext improved   Distal pulses well felt  Neurological : sedated        Medications:  insulin lispro, 0-4 Units, Q4H  insulin glargine, 15 Units, BID  [Held by provider] dilTIAZem, 60 mg, 3 times per day  vancomycin, 1,000 mg, Q12H  [Held by provider] metoprolol tartrate, 50 mg, BID  Gabapentin, 200 mg, 4x Daily  albuterol, 2.5 mg, 4x Daily RT  fluconazole, 200 mg, Q24H  fat emulsion, 250 mL, Once per day on Monday Thursday  sodium chloride flush, 5-40 mL, 2 times per day  piperacillin-tazobactam, 3,375 mg, Q8H  [Held by  [[id number]]  (Abnormal) Collected: 02/15/25 1503    Order Status: Completed Specimen: Nares Updated: 02/16/25 2222     Organism Staph aureus MRSA     MRSA SCREEN RT-PCR --     POSITIVE for  Normal Range: Not detected  CONTACT PRECAUTIONS INDICATED      Narrative:      ORDER#: [id number]                          ORDERED BY: BARBY SANTIAGO  SOURCE: Nares                              COLLECTED:  02/15/25 15:03  ANTIBIOTICS AT [facility].:                      RECEIVED :  02/16/25 01:54  CALL  [place]  [facility] tel. [phone],  Previous panic on this admission - call not needed per SOP, 02/16/2025 22:22,  by ROBERT    Culture, Respiratory (with Gram Stain) [[id number]]  (Abnormal)  (Susceptibility) Collected: 02/13/25 1305    Order Status: Completed Specimen: Sputum Expectorated Updated: 02/16/25 1114     CULTURE, RESPIRATORY Heavy growth normal respiratory nghia with     Gram Stain Result 3+ Gram positive rods  1+ Gram positive cocci  2+ WBC's  1+ Epithelial Cells       Organism Staph aureus MRSA     CULTURE, RESPIRATORY --     Moderate growth  CONTACT PRECAUTIONS INDICATED  PBP2= POSITIVE      Narrative:      ORDER#: [id number]                          ORDERED BY: BARBY SANTIAGO  SOURCE: Sputum Expectorated                COLLECTED:  02/13/25 13:05  ANTIBIOTICS AT [facility].:                      RECEIVED :  02/13/25 14:08  CALL  [place]  [facility] tel. [phone],    Susceptibility        Methicillin-Resistant Staphylococcus aureus      BACTERIAL SUSCEPTIBILITY PANEL BY LUCILLE      amoxicillin-clavulanate >4/2 mcg/mL Resistant      ampicillin-sulbactam <=8/4 mcg/mL Resistant      ceFAZolin <=8 mcg/mL Resistant      clindamycin <=0.25 mcg/mL Sensitive      erythromycin >4 mcg/mL Resistant      linezolid 2 mcg/mL Sensitive      oxacillin >2 mcg/mL Resistant      tetracycline <=4 mcg/mL Sensitive      trimethoprim-sulfamethoxazole <=0.5/9.5 mcg/mL Sensitive      vancomycin 1 mcg/mL Sensitive                           MRSA DNA Probe, Nasal [[id number]]

## 2025-02-20 NOTE — PROGRESS NOTES
02/20/25 0301   Patient Observation   Pulse 66   Respirations 21   SpO2 100 %   Patient Observations ETT 7.5/ 24@lips   Breath Sounds   Respiratory Pattern Regular   Right Upper Lobe Rhonchi   Right Middle Lobe Rhonchi   Right Lower Lobe Rhonchi   Left Upper Lobe Rhonchi   Left Lower Lobe Rhonchi   Vent Information   Vent Mode AC/VC   Ventilator Settings   FiO2  35 %   Vt (Set, mL) 500 mL   Resp Rate (Set) 18 bpm   PEEP/CPAP (cmH2O) 5   Vent Patient Data (Readings)   Vt (Measured) 549 mL   Peak Inspiratory Pressure (cmH2O) 42 cmH2O   Rate Measured 19 br/min   Minute Volume (L/min) 10 Liters   Peak Inspiratory Flow (lpm) 80 L/sec   Mean Airway Pressure (cmH2O) 13 cmH20   Plateau Pressure (cm H2O) 25 cm H2O   Driving Pressure 20   I:E Ratio 1:3.10   Flow Sensitivity 3 L/min   Backup Apnea On   Backup Rate 18 Breaths Per Minute   Backup Vt 500   Vent Alarm Settings   High Pressure (cmH2O) 55 cmH2O   Low Minute Volume (lpm) 4 L/min   High Minute Volume (lpm) 20 L/min   Low Exhaled Vt (ml) 250 mL   High Exhaled Vt (ml) 1000 mL   RR High (bpm) 40 br/min   Apnea (secs) 20 secs   Additional Respiratoray Assessments   Humidification Source HME   Ambu Bag With Mask At Bedside Yes   Airway Clearance   Suction ET Tube   Suction Device Inline suction catheter   Sputum Method Obtained Endotracheal   Sputum Amount Small   Sputum Color/Odor Clear   Sputum Consistency Thick   ETT    Placement Date/Time: 02/14/25 1135   Present on Admission/Arrival: No  Placed By: RT  Placement Verified By: Auscultation;Colorimetric ETCO2 device;Chest X-ray  Preoxygenation: Yes  Airway Tube Size: 7.5 mm  Secured At: 24 cm  Measured From: Lips   Secured At 24 cm   Measured From Lips   ETT Placement Left  (moved by RT)   Secured By Commercial tube quispe   Site Assessment Dry   Cuff Pressure   (mov)   Tie/Quispe Changed No

## 2025-02-20 NOTE — PROGRESS NOTES
02/19/25 1934   Patient Observation   Pulse 75   Respirations 21   SpO2 96 %   Patient Observations ETT SIZE 7.5, SECURED AT 24 LIP LINE.  AMBU BAG AT EHAD OF BED.  HME.   Breath Sounds   Right Upper Lobe Rhonchi   Right Middle Lobe Rhonchi   Right Lower Lobe Rhonchi   Left Upper Lobe Rhonchi   Left Lower Lobe Rhonchi   Vent Information   Vent Mode AC/VC   Ventilator Settings   FiO2  35 %   Vt (Set, mL) 500 mL   Resp Rate (Set) 18 bpm   PEEP/CPAP (cmH2O) 5   Vent Patient Data (Readings)   Vt (Measured) 533 mL   Peak Inspiratory Pressure (cmH2O) 35 cmH2O   Rate Measured 22 br/min   Minute Volume (L/min) 11 Liters   Peak Inspiratory Flow (lpm) 80 L/sec   Mean Airway Pressure (cmH2O) 12 cmH20   Plateau Pressure (cm H2O) 25 cm H2O   Driving Pressure 20   I:E Ratio 1:3.00   Flow Sensitivity 3 L/min   Static Compliance (L/cm H2O) 27   Dynamic Compliance (L/cm H2O) 18 L/cm H2O   Vent Alarm Settings   High Pressure (cmH2O) 55 cmH2O   Low Minute Volume (lpm) 4 L/min   High Minute Volume (lpm) 20 L/min   Low Exhaled Vt (ml) 250 mL   High Exhaled Vt (ml) 100 mL   RR High (bpm) 40 br/min   Apnea (secs) 20 secs   Additional Respiratoray Assessments   Humidification Source HME   Circuit Condensation Drained   Ambu Bag With Mask At Bedside Yes   Airway Clearance   Suction ET Tube   Suction Device Inline suction catheter   Sputum Method Obtained Endotracheal   Sputum Amount Small   Sputum Color/Odor Tan   Sputum Consistency Thick   ETT    Placement Date/Time: 02/14/25 1135   Present on Admission/Arrival: No  Placed By: RT  Placement Verified By: Auscultation;Colorimetric ETCO2 device;Chest X-ray  Preoxygenation: Yes  Airway Tube Size: 7.5 mm  Secured At: 24 cm  Measured From: Lips   Secured At 24 cm   Measured From Lips   ETT Placement (S)  Right   Secured By Commercial tube may   Site Assessment Dry

## 2025-02-20 NOTE — PROGRESS NOTES
Neurology Follow-up  [facility]  Janes Sainz MD    Date of Service: 02/20/25        Sandor Early is a 69 y.o. 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    Subjective:   CC: Follow up today regarding: Seizure-like activity (bilateral shaking with retained awareness) and encephalopathy in the setting of alcoholic cirrhosis, alcohol withdrawal, acute on chronic respiratory failure    Events noted. Chart and lab reviewed.     Hospital course has been further complicated by acute respiratory failure requiring BiPAP then later weaned to nasal cannula oxygen; enteritis with partial small bowel obstruction treated with nasogastric suction, n.p.o., Zosyn, Diflucan, vancomycin, TPN.    Neurology was reconsulted for management of zonisamide given treatment of enteritis with partial small bowel obstruction with bowel rest.  Last dose of zonisamide was 2/18.  He was previously treated with Keppra and that was switched to zonisamide on 2/13 due to lower risk of behavioral agitation.  Last documented seizure-like episode was on 2/7.  Gabapentin was also reduced to 200 mg 4 times daily for alcohol withdrawal.    He previously did not tolerate brain MRI attempts.    ROS : A 10-12 system review obtained and updated today and is unremarkable except as mentioned  in my interval history.     Medication, past medical history, social history, and  overlap.    Restart gabapentin 200 mg 4 times daily when able per bowel rest requirements.    Follow up neurology clinic 3 months.     Neurology will continue to follow peripherally. Notify neurology when bowel rest is no longer required and we will make a plan for transition from Keppra to zonisamide.    Electronically signed by Art Sainz MD on 2/20/2025 at 3:34 PM

## 2025-02-20 NOTE — PROGRESS NOTES
[facility]                            Critical care Progress Note :      CC: COPD respiratory failure  Flu     Events of Last 24 hours:   Did not do we;ll with SBT  Propofol off   Precedex 1   22/500/35/5  PEEP 5  FiO2 40%  Minimal secretions   Small BM last night   Ileus/abscess   TPN per surgery,started trophic feeding  Surgery following   For Ct scan with contrast       Vascular lines: IV: Left IJ     MV:   Vent Mode: AC/VC Resp Rate (Set): 18 bpm/Vt (Set, mL): 500 mL/ /FiO2 : 35 %  Recent Labs     25  0458 25  0550   PHART 7.377 7.377   AZM2FYX 53.4* 57.2*   PO2ART 89.5 89.6       IV:   PN-Adult Premix  - Standard Electrolytes - Central Line 83 mL/hr at 25 0851    sodium chloride      dexmedeTOMIDine HCl in NaCl 1.4 mcg/kg/hr (25)    propofol 7.5 mcg/kg/min (25)    sodium chloride      dextrose         Vitals:  Blood pressure (!) 118/53, pulse 70, temperature 98.2 °F (36.8 °C), temperature source Bladder, resp. rate 22, height 1.702 m (5' 7.01\"), weight 92.3 kg (203 lb 7.8 oz), SpO2 98%.  on   Temp  Av.2 °F (37.3 °C)  Min: 97.1 °F (36.2 °C)  Max: 100.7 °F (38.2 °C)    Intake/Output Summary (Last 24 hours) at 2025 09  Last data filed at 2025 0851  Gross per 24 hour   Intake 4161.36 ml   Output 3260 ml   Net 901.36 ml     EXAM:  General: ill appearing.  Intubated sedated.  Eyes: No sclera icterus. No conjunctival injection.  ENT: No discharge. ET tube in place  Neck: Trachea midline.   Resp: No accessory muscle use. + crackles. No wheezing. + rhonchi.   CV: tachy rate. Regular rhythm. No mumur or rub. No edema.   GI: Non-tender. Non-distended.   Skin: Warm and dry. No rash on exposed extremities.  M/S: No cyanosis. No clubbing.   Neuro: Intubated sedated.  Not following commands.  Psych: Noncommunicative unable to obtain        Scheduled Meds:   insulin lispro  0-4 Units SubCUTAneous Q4H

## 2025-02-20 NOTE — PROGRESS NOTES
Reassessment completed, see flowsheets, unchanged from prior. VSS.   Propofol 5 mcg/kg/min, precedex 1.4 mcg/kg/hr.      All ICU Lines and monitoring remain in place. Bed locked in lowest position. Call light within reach.

## 2025-02-20 NOTE — PROGRESS NOTES
Routine consult sent via perfect serve, Electronically signed by Bruno Harley on 2/20/2025 at 12:09 PM

## 2025-02-20 NOTE — PROGRESS NOTES
Surgery at the bedside to examine pt. See progress note for details.     Start trophic feeds today, 15 ml/hr. Do NOT advance.   Potentially suppository today or tomorrow.

## 2025-02-21 ENCOUNTER — APPOINTMENT (OUTPATIENT)
Dept: GENERAL RADIOLOGY | Age: 70
DRG: 207 | End: 2025-02-21
Payer: MEDICARE

## 2025-02-21 LAB
ALBUMIN SERPL-MCNC: 1.7 G/DL (ref 3.4–5)
ALP SERPL-CCNC: 224 U/L (ref 40–129)
ALT SERPL-CCNC: 15 U/L (ref 10–40)
ANION GAP SERPL CALCULATED.3IONS-SCNC: 4 MMOL/L (ref 3–16)
ANISOCYTOSIS BLD QL SMEAR: ABNORMAL
APPEARANCE BRONCH: NORMAL
AST SERPL-CCNC: 24 U/L (ref 15–37)
BASE EXCESS BLDA CALC-SCNC: 7.5 MMOL/L (ref -3–3)
BASE EXCESS BLDA CALC-SCNC: 8.1 MMOL/L (ref -3–3)
BASOPHILS # BLD: 0 K/UL (ref 0–0.2)
BASOPHILS NFR BLD: 1 %
BILIRUB DIRECT SERPL-MCNC: <0.1 MG/DL (ref 0–0.3)
BILIRUB INDIRECT SERPL-MCNC: ABNORMAL MG/DL (ref 0–1)
BILIRUB SERPL-MCNC: <0.2 MG/DL (ref 0–1)
BUN SERPL-MCNC: 11 MG/DL (ref 7–20)
CALCIUM SERPL-MCNC: 7.5 MG/DL (ref 8.3–10.6)
CHLORIDE SERPL-SCNC: 99 MMOL/L (ref 99–110)
CLOT SPEC QL: NORMAL
CO2 BLDA-SCNC: 36 MMOL/L
CO2 BLDA-SCNC: 37 MMOL/L
CO2 SERPL-SCNC: 34 MMOL/L (ref 21–32)
COHGB MFR BLDA: 0.1 % (ref 0–1.5)
COHGB MFR BLDA: 0.2 % (ref 0–1.5)
COLOR BRONCH: NORMAL
CREAT SERPL-MCNC: 0.6 MG/DL (ref 0.8–1.3)
DEPRECATED RDW RBC AUTO: 16.4 % (ref 12.4–15.4)
EOSINOPHIL # BLD: 0.1 K/UL (ref 0–0.6)
EOSINOPHIL NFR BLD: 2 %
GFR SERPLBLD CREATININE-BSD FMLA CKD-EPI: >90 ML/MIN/{1.73_M2}
GLUCOSE BLD-MCNC: 236 MG/DL (ref 70–99)
GLUCOSE BLD-MCNC: 240 MG/DL (ref 70–99)
GLUCOSE BLD-MCNC: 245 MG/DL (ref 70–99)
GLUCOSE BLD-MCNC: 246 MG/DL (ref 70–99)
GLUCOSE BLD-MCNC: 254 MG/DL (ref 70–99)
GLUCOSE BLD-MCNC: 262 MG/DL (ref 70–99)
GLUCOSE BLD-MCNC: 264 MG/DL (ref 70–99)
GLUCOSE SERPL-MCNC: 251 MG/DL (ref 70–99)
HCO3 BLDA-SCNC: 34.2 MMOL/L (ref 21–29)
HCO3 BLDA-SCNC: 34.9 MMOL/L (ref 21–29)
HCT VFR BLD AUTO: 23.8 % (ref 40.5–52.5)
HGB BLD-MCNC: 7.9 G/DL (ref 13.5–17.5)
HGB BLDA-MCNC: 8.6 G/DL (ref 13.5–17.5)
HGB BLDA-MCNC: 9.3 G/DL (ref 13.5–17.5)
LYMPHOCYTES # BLD: 0.8 K/UL (ref 1–5.1)
LYMPHOCYTES NFR BLD: 19 %
MAGNESIUM SERPL-MCNC: 1.58 MG/DL (ref 1.8–2.4)
MCH RBC QN AUTO: 30.9 PG (ref 26–34)
MCHC RBC AUTO-ENTMCNC: 33.2 G/DL (ref 31–36)
MCV RBC AUTO: 92.9 FL (ref 80–100)
METHGB MFR BLDA: 0.3 %
METHGB MFR BLDA: 0.3 %
MONOCYTES # BLD: 0.2 K/UL (ref 0–1.3)
MONOCYTES NFR BLD: 5 %
NEUTROPHILS # BLD: 2.9 K/UL (ref 1.7–7.7)
NEUTROPHILS NFR BLD: 72 %
NEUTS BAND NFR BLD MANUAL: 1 % (ref 0–7)
NUC CELL # BRONCH MANUAL: 268 /CUMM
O2 THERAPY: ABNORMAL
O2 THERAPY: ABNORMAL
PATH REV: YES
PCO2 BLDA: 57.3 MMHG (ref 35–45)
PCO2 BLDA: 69 MMHG (ref 35–45)
PERFORMED ON: ABNORMAL
PH BLDA: 7.32 [PH] (ref 7.35–7.45)
PH BLDA: 7.39 [PH] (ref 7.35–7.45)
PHOSPHATE SERPL-MCNC: 2.7 MG/DL (ref 2.5–4.9)
PLATELET # BLD AUTO: 266 K/UL (ref 135–450)
PLATELET BLD QL SMEAR: ADEQUATE
PMV BLD AUTO: 7.6 FL (ref 5–10.5)
PO2 BLDA: 103.7 MMHG (ref 75–108)
PO2 BLDA: 93.1 MMHG (ref 75–108)
POIKILOCYTOSIS BLD QL SMEAR: ABNORMAL
POLYCHROMASIA BLD QL SMEAR: ABNORMAL
POTASSIUM SERPL-SCNC: 3.5 MMOL/L (ref 3.5–5.1)
PROT SERPL-MCNC: 6.4 G/DL (ref 6.4–8.2)
RBC # BLD AUTO: 2.57 M/UL (ref 4.2–5.9)
RBC # FLD MANUAL: 6800 /CUMM
SAO2 % BLDA: 96.3 %
SAO2 % BLDA: 97.6 %
SLIDE REVIEW: ABNORMAL
SODIUM SERPL-SCNC: 137 MMOL/L (ref 136–145)
TRIGL SERPL-MCNC: 164 MG/DL (ref 0–150)
WBC # BLD AUTO: 4 K/UL (ref 4–11)

## 2025-02-21 PROCEDURE — 2580000003 HC RX 258: Performed by: INTERNAL MEDICINE

## 2025-02-21 PROCEDURE — 6370000000 HC RX 637 (ALT 250 FOR IP): Performed by: INTERNAL MEDICINE

## 2025-02-21 PROCEDURE — 87205 SMEAR GRAM STAIN: CPT

## 2025-02-21 PROCEDURE — 6370000000 HC RX 637 (ALT 250 FOR IP): Performed by: SURGERY

## 2025-02-21 PROCEDURE — 88305 TISSUE EXAM BY PATHOLOGIST: CPT

## 2025-02-21 PROCEDURE — 2500000003 HC RX 250 WO HCPCS: Performed by: STUDENT IN AN ORGANIZED HEALTH CARE EDUCATION/TRAINING PROGRAM

## 2025-02-21 PROCEDURE — 87116 MYCOBACTERIA CULTURE: CPT

## 2025-02-21 PROCEDURE — 87015 SPECIMEN INFECT AGNT CONCNTJ: CPT

## 2025-02-21 PROCEDURE — 6360000002 HC RX W HCPCS: Performed by: INTERNAL MEDICINE

## 2025-02-21 PROCEDURE — 99232 SBSQ HOSP IP/OBS MODERATE 35: CPT | Performed by: SURGERY

## 2025-02-21 PROCEDURE — 6360000002 HC RX W HCPCS: Performed by: STUDENT IN AN ORGANIZED HEALTH CARE EDUCATION/TRAINING PROGRAM

## 2025-02-21 PROCEDURE — 0B9F8ZX DRAINAGE OF RIGHT LOWER LUNG LOBE, VIA NATURAL OR ARTIFICIAL OPENING ENDOSCOPIC, DIAGNOSTIC: ICD-10-PCS | Performed by: INTERNAL MEDICINE

## 2025-02-21 PROCEDURE — 87070 CULTURE OTHR SPECIMN AEROBIC: CPT

## 2025-02-21 PROCEDURE — 84100 ASSAY OF PHOSPHORUS: CPT

## 2025-02-21 PROCEDURE — 2700000000 HC OXYGEN THERAPY PER DAY

## 2025-02-21 PROCEDURE — 83735 ASSAY OF MAGNESIUM: CPT

## 2025-02-21 PROCEDURE — APPSS15 APP SPLIT SHARED TIME 0-15 MINUTES

## 2025-02-21 PROCEDURE — 80076 HEPATIC FUNCTION PANEL: CPT

## 2025-02-21 PROCEDURE — 87102 FUNGUS ISOLATION CULTURE: CPT

## 2025-02-21 PROCEDURE — 84478 ASSAY OF TRIGLYCERIDES: CPT

## 2025-02-21 PROCEDURE — 87206 SMEAR FLUORESCENT/ACID STAI: CPT

## 2025-02-21 PROCEDURE — 31622 DX BRONCHOSCOPE/WASH: CPT

## 2025-02-21 PROCEDURE — 36415 COLL VENOUS BLD VENIPUNCTURE: CPT

## 2025-02-21 PROCEDURE — 88312 SPECIAL STAINS GROUP 1: CPT

## 2025-02-21 PROCEDURE — 99291 CRITICAL CARE FIRST HOUR: CPT | Performed by: INTERNAL MEDICINE

## 2025-02-21 PROCEDURE — 89050 BODY FLUID CELL COUNT: CPT

## 2025-02-21 PROCEDURE — 0B9F8ZX DRAINAGE OF RIGHT LOWER LUNG LOBE, VIA NATURAL OR ARTIFICIAL OPENING ENDOSCOPIC, DIAGNOSTIC: ICD-10-PCS

## 2025-02-21 PROCEDURE — 31624 DX BRONCHOSCOPE/LAVAGE: CPT | Performed by: INTERNAL MEDICINE

## 2025-02-21 PROCEDURE — 80048 BASIC METABOLIC PNL TOTAL CA: CPT

## 2025-02-21 PROCEDURE — 2000000000 HC ICU R&B

## 2025-02-21 PROCEDURE — 6370000000 HC RX 637 (ALT 250 FOR IP)

## 2025-02-21 PROCEDURE — 71045 X-RAY EXAM CHEST 1 VIEW: CPT

## 2025-02-21 PROCEDURE — 82803 BLOOD GASES ANY COMBINATION: CPT

## 2025-02-21 PROCEDURE — 2500000003 HC RX 250 WO HCPCS: Performed by: SURGERY

## 2025-02-21 PROCEDURE — 94003 VENT MGMT INPAT SUBQ DAY: CPT

## 2025-02-21 PROCEDURE — 94761 N-INVAS EAR/PLS OXIMETRY MLT: CPT

## 2025-02-21 PROCEDURE — 94640 AIRWAY INHALATION TREATMENT: CPT

## 2025-02-21 PROCEDURE — 2500000003 HC RX 250 WO HCPCS: Performed by: INTERNAL MEDICINE

## 2025-02-21 PROCEDURE — 88112 CYTOPATH CELL ENHANCE TECH: CPT

## 2025-02-21 PROCEDURE — 99233 SBSQ HOSP IP/OBS HIGH 50: CPT | Performed by: INTERNAL MEDICINE

## 2025-02-21 PROCEDURE — 85025 COMPLETE CBC W/AUTO DIFF WBC: CPT

## 2025-02-21 RX ORDER — FUROSEMIDE 10 MG/ML
40 INJECTION INTRAMUSCULAR; INTRAVENOUS ONCE
Status: COMPLETED | OUTPATIENT
Start: 2025-02-21 | End: 2025-02-21

## 2025-02-21 RX ORDER — BISACODYL 10 MG
10 SUPPOSITORY, RECTAL RECTAL ONCE
Status: COMPLETED | OUTPATIENT
Start: 2025-02-21 | End: 2025-02-21

## 2025-02-21 RX ORDER — LIDOCAINE HYDROCHLORIDE 40 MG/ML
SOLUTION TOPICAL
Status: COMPLETED
Start: 2025-02-21 | End: 2025-02-21

## 2025-02-21 RX ORDER — METOPROLOL TARTRATE 50 MG
50 TABLET ORAL ONCE
Status: COMPLETED | OUTPATIENT
Start: 2025-02-21 | End: 2025-02-21

## 2025-02-21 RX ORDER — VANCOMYCIN 1.75 G/350ML
1250 INJECTION, SOLUTION INTRAVENOUS EVERY 12 HOURS
Status: DISCONTINUED | OUTPATIENT
Start: 2025-02-21 | End: 2025-02-24

## 2025-02-21 RX ADMIN — ALBUTEROL SULFATE 2.5 MG: 2.5 SOLUTION RESPIRATORY (INHALATION) at 20:25

## 2025-02-21 RX ADMIN — PROPOFOL 15 MCG/KG/MIN: 10 INJECTION, EMULSION INTRAVENOUS at 10:48

## 2025-02-21 RX ADMIN — ALBUTEROL SULFATE 2.5 MG: 2.5 SOLUTION RESPIRATORY (INHALATION) at 15:47

## 2025-02-21 RX ADMIN — GABAPENTIN 200 MG: 250 SOLUTION ORAL at 13:42

## 2025-02-21 RX ADMIN — FUROSEMIDE 40 MG: 10 INJECTION, SOLUTION INTRAMUSCULAR; INTRAVENOUS at 10:55

## 2025-02-21 RX ADMIN — DEXMEDETOMIDINE HYDROCHLORIDE 1.5 MCG/KG/HR: 4 INJECTION, SOLUTION INTRAVENOUS at 03:10

## 2025-02-21 RX ADMIN — LEVETIRACETAM 750 MG: 100 INJECTION INTRAVENOUS at 20:37

## 2025-02-21 RX ADMIN — INSULIN LISPRO 8 UNITS: 100 INJECTION, SOLUTION INTRAVENOUS; SUBCUTANEOUS at 11:57

## 2025-02-21 RX ADMIN — MAGNESIUM SULFATE HEPTAHYDRATE 2000 MG: 40 INJECTION, SOLUTION INTRAVENOUS at 09:20

## 2025-02-21 RX ADMIN — SODIUM CHLORIDE, PRESERVATIVE FREE 10 ML: 5 INJECTION INTRAVENOUS at 11:01

## 2025-02-21 RX ADMIN — MIDAZOLAM HYDROCHLORIDE 2 MG: 1 INJECTION, SOLUTION INTRAMUSCULAR; INTRAVENOUS at 11:12

## 2025-02-21 RX ADMIN — METOPROLOL TARTRATE 50 MG: 50 TABLET, FILM COATED ORAL at 20:18

## 2025-02-21 RX ADMIN — POTASSIUM CHLORIDE 20 MEQ: 400 INJECTION, SOLUTION INTRAVENOUS at 09:24

## 2025-02-21 RX ADMIN — GABAPENTIN 200 MG: 250 SOLUTION ORAL at 16:48

## 2025-02-21 RX ADMIN — INSULIN LISPRO 8 UNITS: 100 INJECTION, SOLUTION INTRAVENOUS; SUBCUTANEOUS at 21:00

## 2025-02-21 RX ADMIN — INSULIN GLARGINE 20 UNITS: 100 INJECTION, SOLUTION SUBCUTANEOUS at 21:00

## 2025-02-21 RX ADMIN — INSULIN LISPRO 4 UNITS: 100 INJECTION, SOLUTION INTRAVENOUS; SUBCUTANEOUS at 00:56

## 2025-02-21 RX ADMIN — VANCOMYCIN 1250 MG: 1.75 INJECTION, SOLUTION INTRAVENOUS at 18:10

## 2025-02-21 RX ADMIN — LIDOCAINE HYDROCHLORIDE: 40 SOLUTION ORAL at 10:49

## 2025-02-21 RX ADMIN — GABAPENTIN 200 MG: 250 SOLUTION ORAL at 08:49

## 2025-02-21 RX ADMIN — DEXMEDETOMIDINE HYDROCHLORIDE 1.5 MCG/KG/HR: 4 INJECTION, SOLUTION INTRAVENOUS at 09:32

## 2025-02-21 RX ADMIN — ASCORBIC ACID, VITAMIN A PALMITATE, CHOLECALCIFEROL, THIAMINE HYDROCHLORIDE, RIBOFLAVIN-5 PHOSPHATE SODIUM, PYRIDOXINE HYDROCHLORIDE, NIACINAMIDE, DEXPANTHENOL, ALPHA-TOCOPHEROL ACETATE, VITAMIN K1, FOLIC ACID, BIOTIN, CYANOCOBALAMIN: 200; 3300; 200; 6; 3.6; 6; 40; 15; 10; 150; 600; 60; 5 INJECTION, SOLUTION INTRAVENOUS at 18:20

## 2025-02-21 RX ADMIN — POTASSIUM CHLORIDE 20 MEQ: 400 INJECTION, SOLUTION INTRAVENOUS at 10:40

## 2025-02-21 RX ADMIN — DEXMEDETOMIDINE HYDROCHLORIDE 1.5 MCG/KG/HR: 4 INJECTION, SOLUTION INTRAVENOUS at 06:13

## 2025-02-21 RX ADMIN — SODIUM CHLORIDE, PRESERVATIVE FREE 10 ML: 5 INJECTION INTRAVENOUS at 20:21

## 2025-02-21 RX ADMIN — MIDAZOLAM HYDROCHLORIDE 2 MG: 1 INJECTION, SOLUTION INTRAMUSCULAR; INTRAVENOUS at 09:37

## 2025-02-21 RX ADMIN — INSULIN LISPRO 4 UNITS: 100 INJECTION, SOLUTION INTRAVENOUS; SUBCUTANEOUS at 16:47

## 2025-02-21 RX ADMIN — DEXMEDETOMIDINE HYDROCHLORIDE 1.5 MCG/KG/HR: 4 INJECTION, SOLUTION INTRAVENOUS at 12:28

## 2025-02-21 RX ADMIN — PANTOPRAZOLE SODIUM 40 MG: 40 INJECTION, POWDER, LYOPHILIZED, FOR SOLUTION INTRAVENOUS at 08:51

## 2025-02-21 RX ADMIN — BISACODYL 10 MG: 10 SUPPOSITORY RECTAL at 16:02

## 2025-02-21 RX ADMIN — ALBUTEROL SULFATE 2.5 MG: 2.5 SOLUTION RESPIRATORY (INHALATION) at 07:32

## 2025-02-21 RX ADMIN — DEXMEDETOMIDINE HYDROCHLORIDE 1.4 MCG/KG/HR: 4 INJECTION, SOLUTION INTRAVENOUS at 00:02

## 2025-02-21 RX ADMIN — ENOXAPARIN SODIUM 40 MG: 100 INJECTION SUBCUTANEOUS at 08:48

## 2025-02-21 RX ADMIN — PROPOFOL 15 MCG/KG/MIN: 10 INJECTION, EMULSION INTRAVENOUS at 21:07

## 2025-02-21 RX ADMIN — DEXMEDETOMIDINE HYDROCHLORIDE 1.5 MCG/KG/HR: 4 INJECTION, SOLUTION INTRAVENOUS at 15:10

## 2025-02-21 RX ADMIN — SODIUM CHLORIDE 1000 MG: 9 INJECTION, SOLUTION INTRAVENOUS at 06:12

## 2025-02-21 RX ADMIN — DEXMEDETOMIDINE HYDROCHLORIDE 1.5 MCG/KG/HR: 4 INJECTION, SOLUTION INTRAVENOUS at 23:59

## 2025-02-21 RX ADMIN — ALBUTEROL SULFATE 2.5 MG: 2.5 SOLUTION RESPIRATORY (INHALATION) at 11:29

## 2025-02-21 RX ADMIN — GABAPENTIN 200 MG: 250 SOLUTION ORAL at 20:37

## 2025-02-21 RX ADMIN — MIDAZOLAM HYDROCHLORIDE 2 MG: 1 INJECTION, SOLUTION INTRAMUSCULAR; INTRAVENOUS at 06:05

## 2025-02-21 RX ADMIN — METOPROLOL TARTRATE 50 MG: 50 TABLET, FILM COATED ORAL at 10:55

## 2025-02-21 RX ADMIN — DEXMEDETOMIDINE HYDROCHLORIDE 1.5 MCG/KG/HR: 4 INJECTION, SOLUTION INTRAVENOUS at 18:23

## 2025-02-21 RX ADMIN — THIAMINE HYDROCHLORIDE 200 MG: 100 INJECTION, SOLUTION INTRAMUSCULAR; INTRAVENOUS at 12:52

## 2025-02-21 RX ADMIN — DEXMEDETOMIDINE HYDROCHLORIDE 1.5 MCG/KG/HR: 4 INJECTION, SOLUTION INTRAVENOUS at 21:06

## 2025-02-21 RX ADMIN — LEVETIRACETAM 750 MG: 100 INJECTION INTRAVENOUS at 08:51

## 2025-02-21 RX ADMIN — MIDAZOLAM HYDROCHLORIDE 2 MG: 1 INJECTION, SOLUTION INTRAMUSCULAR; INTRAVENOUS at 03:10

## 2025-02-21 RX ADMIN — INSULIN LISPRO 4 UNITS: 100 INJECTION, SOLUTION INTRAVENOUS; SUBCUTANEOUS at 08:48

## 2025-02-21 RX ADMIN — INSULIN GLARGINE 20 UNITS: 100 INJECTION, SOLUTION SUBCUTANEOUS at 08:49

## 2025-02-21 ASSESSMENT — PULMONARY FUNCTION TESTS
PIF_VALUE: 49
PIF_VALUE: 39
PIF_VALUE: 38
PIF_VALUE: 52
PIF_VALUE: 38
PIF_VALUE: 45
PIF_VALUE: 49
PIF_VALUE: 35
PIF_VALUE: 43
PIF_VALUE: 36
PIF_VALUE: 36

## 2025-02-21 ASSESSMENT — PAIN SCALES - GENERAL
PAINLEVEL_OUTOF10: 0

## 2025-02-21 ASSESSMENT — PAIN SCALES - WONG BAKER
WONGBAKER_NUMERICALRESPONSE: NO HURT

## 2025-02-21 NOTE — PROGRESS NOTES
General Surgery  Daily Progress Note    Pt Name: Sandor Early  Medical Record Number: [id number]  Date of Birth 1955   Today's Date: 2/21/2025  Admit date: 1/30/2025  LOS: Day 22    SUBJECTIVE  Intubated and sedated.      OBJECTIVE  Vitals:    02/21/25 0500 02/21/25 0600 02/21/25 0700 02/21/25 0800   BP: (!) 132/52 (!) 134/51 (!) 119/53 (!) 149/65   Pulse: 73 87 86 90   Resp: 21 24 18 23   Temp:   99.3 °F (37.4 °C)    TempSrc:   Bladder    SpO2: 96% 100% 94% 93%   Weight:  89.7 kg (197 lb 12 oz)     Height:           Gen: Sedated  Resp: Intubated.  CV: Regular rate. Regular rhythm.   GI: No pain response with palpation. +Softly distended, similar to yesterday exam, ?protuberance rather than true distension. +Increasing BS.   Skin: Warm and dry. No nodule or rash on exposed extremities.       I/O last 3 completed shifts:  In: 7983.6 [I.V.:1467.8; NG/GT:686; IV Piggyback:1901.4]  Out: 5450 [Urine:5350; Emesis/NG output:100]  No intake/output data recorded.    LABS  CBC:   Recent Labs     02/19/25  0452 02/20/25  0550 02/21/25  0554   WBC 3.2* 3.5* 4.0   HGB 7.8* 7.7* 7.9*   HCT 23.4* 23.1* 23.8*   MCV 92.5 92.5 92.9    261 266     BMP:   Recent Labs     02/19/25  0452 02/20/25  0550 02/21/25  0554    138 137   K 3.8 3.4* 3.5    101 99   CO2 33* 35* 34*   PHOS 2.6 3.0 2.7   BUN 14 12 11   CREATININE 0.8 0.7* 0.6*     LIVER PROFILE:   Recent Labs     02/19/25  0452 02/21/25  0554   AST 22 24   ALT 12 15   BILIDIR 0.2 <0.1   BILITOT 0.3 <0.2   ALKPHOS 201* 224*       PT/INR:   No results for input(s): \"PROTIME\", \"INR\" in the last 72 hours.    APTT: No results for input(s): \"APTT\" in the last 72 hours.  UA:  No results for input(s): \"NITRITE\", \"COLORU\", \"PHUR\", \"LABCAST\", \"WBCUA\", \"RBCUA\", \"MUCUS\", \"TRICHOMONAS\", \"YEAST\", \"BACTERIA\", \"CLARITYU\", \"SPECGRAV\", \"LEUKOCYTESUR\", \"UROBILINOGEN\", \"BILIRUBINUR\", \"BLOODU\", \"GLUCOSEU\", \"AMORPHOUS\" in the last 72 hours.    Invalid input(s):  remains on vancomycin  WBC wnl    BM was recorded yesterday but RN reports only a smear      PLAN:   Continue trophic NG TF today @ 15 mL/hr without advancement   Continue to monitor bowel function - strict I&O's   Continue TPN - will consider weaning once confirmed to be tolerating TF at or near goal rate   Diet NPO  PRN pain control and antiemetics  DVT prophylaxis with Lx      Dispo: liliana Maria PA-C  2/21/2025 8:25 AM      Patient seen and examined.  I agree with the assessment and plan from MIKE Bae.  More than half of the time spent on this encounter was completed by me including the history, physical examination and the entire medical decision making.     Patient stable on vent.  Tolerating low rate tf.  Abdomen soft.    Continue current treatment, TPN and will trial slow TF advancement over the weekend.     LINDY DE SANTIAGO MD

## 2025-02-21 NOTE — PROGRESS NOTES
02/20/25 2022   RT Protocol   History Pulmonary Disease 2   Respiratory pattern 0   Breath sounds 2   Cough 1   Indications for Bronchodilator Therapy Decreased or absent breath sounds   Bronchodilator Assessment Score 5     RT Inhaler-Nebulizer Bronchodilator Protocol Note    There is a bronchodilator order in the chart from a provider indicating to follow the RT Bronchodilator Protocol and there is an “Initiate RT Inhaler-Nebulizer Bronchodilator Protocol” order as well (see protocol at bottom of note).    CXR Findings:  XR CHEST PORTABLE    Result Date: 2/20/2025  Overall stable chest without significant interval change.     XR CHEST PORTABLE    Result Date: 2/20/2025  Endotracheal tube, nasogastric tube, and left jugular line are stable.  NG side port is near the GE junction and could be advanced by 5-10 cm if desired. Stable appearance of the lungs.       The findings from the last RT Protocol Assessment were as follows:   History Pulmonary Disease: Chronic pulmonary disease  Respiratory Pattern: Regular pattern and RR 12-20 bpm  Breath Sounds: Slightly diminished and/or crackles  Cough: Strong, productive  Indication for Bronchodilator Therapy: Decreased or absent breath sounds  Bronchodilator Assessment Score: 5    Aerosolized bronchodilator medication orders have been revised according to the RT Inhaler-Nebulizer Bronchodilator Protocol below.    Respiratory Therapist to perform RT Therapy Protocol Assessment initially then follow the protocol.  Repeat RT Therapy Protocol Assessment PRN for score 0-3 or on second treatment, BID, and PRN for scores above 3.    No Indications - adjust the frequency to every 6 hours PRN wheezing or bronchospasm, if no treatments needed after 48 hours then discontinue using Per Protocol order mode.     If indication present, adjust the RT bronchodilator orders based on the Bronchodilator Assessment Score as indicated below.  Use Inhaler orders unless patient has one or more

## 2025-02-21 NOTE — PROGRESS NOTES
inflammatory stricture and developing partial small bowel   obstruction.  Given rapid development and history, an infectious pattern of   enteritis is suspected.   2. No evidence of pneumatosis or perforation on the current exam.   3. Slight increase in size of a small fluid collection adjacent to the   terminal ileum. A small developing abscess is likely.   4. Severe bladder mucosal thickening. Correlate with urinalysis.   5. Worsening dependent airspace opacities in the lower lobes. Atelectasis or   edema may be considered. Developing pneumonitis cannot be excluded.          CT abdomen 2/19  No abscess,mild effusion     IMPRESSION:  1. No evidence of bowel obstruction or perforation. Minimally dilated small  bowel loops likely related to mild ileus. There is improvement compared to  the previous evaluation.  2. Liver cirrhosis with mild ascites and mesenteric congestion.  3. Small bilateral pleural effusions and lower lobe atelectatic changes.  4. Punctate bilateral nephrolithiasis but no obstructive uropathy on either  side.  5. Aortobifemoral bypass graft with no obvious complications.  6. 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 Significant bilateral renal  artery stenosis near the origin.  7. Fat containing inguinal hernias without bowel involvement.  8. Mild fat containing periumbilical hernia.  9. Mild anasarca.            ASSESSMENT:  Acute hypoxemic hypercapnic respiratory failure   Acute encephalopathy/Metabolic encephalopathy - worse today   MRSA pneumonia   Pulmonary congestion-suspecting developing respiratory infection  COPD   Alcohol withdrawal with Delirium tremens  Acute kidney injury   Ileus/Enteritis ? Developing abscess   Uncontrolled HTN   Chronic hypoxia on 4 L O2  Flu A + 1/30  Tobacco abuse  ROCKWELL cirrhosis  Chronic diastolic dysfunction  DM2  PVD status post aortoiliac bpg  Patient has had several episodes of seizure-like activity   Alcohol use/abuse 30 beer/day   Ilues         PLAN:

## 2025-02-21 NOTE — PROGRESS NOTES
Consent verified for bronchoscopy. Timeout per policy. Procedure performed by Dr. Pro. Bronchoscopy assist performed on 100 FiO2.    0ml of 1% lidocaine instilled and 60 ml of 0.9% normal saline instilled. Pt tolerated bronchoscopy without difficulty, airway support per RTD. Patient SpO2 within acceptable range throughout bronchoscopy procedure.  Sedation provided/monitored per nurse. No s/s distress, no complications noted.  See physician notes.  Continue plan of care

## 2025-02-21 NOTE — PROGRESS NOTES
Patient very restless, agitated, and coughing against the ventilator. NIELS +2    Medicated with versed 2 mg IVP.     After about 3 minutes, patient was more calm and resting more quietly.    Electronically signed by Lindsey Booth RN on 2/21/2025 at 3:13 AM

## 2025-02-21 NOTE — PROGRESS NOTES
Reassessment complete. Respirations no longer shallow. No other changes from previous assessment.

## 2025-02-21 NOTE — PROGRESS NOTES
INPATIENT PROGRESS NOTE        IDENTIFYING DATA/REASON FOR CONSULTATION   PATIENT:  Sandor Early  MRN:  [id number]  ADMIT DATE: 1/30/2025  TIME OF EVALUATION: 2/21/2025 10:45 AM  HOSPITAL STAY:   LOS: 22 days   CONSULTING PHYSICIAN: Darke Jalloh*   REASON FOR CONSULTATION: SBO    Subjective:    Patient seen in follow-up.  Intubated and sedated.  Tolerating tube feeds.    MEDICATIONS   SCHEDULED:  vancomycin, 1,250 mg, Q12H  furosemide, 40 mg, Once  metoprolol tartrate, 50 mg, Once  lidocaine, ,   insulin lispro, 0-16 Units, Q4H  insulin glargine, 20 Units, BID  levETIRAcetam, 750 mg, BID  [Held by provider] dilTIAZem, 60 mg, 3 times per day  metoprolol tartrate, 50 mg, BID  Gabapentin, 200 mg, 4x Daily  albuterol, 2.5 mg, 4x Daily RT  fat emulsion, 250 mL, Once per day on Monday Thursday  sodium chloride flush, 5-40 mL, 2 times per day  [Held by provider] zonisamide, 100 mg, Daily  thiamine (B-1) 200 mg in sodium chloride 0.9 % 100 mL IVPB, 200 mg, Q24H  pantoprazole (PROTONIX) 40 mg in sodium chloride (PF) 0.9 % 10 mL injection, 40 mg, Daily  enoxaparin, 40 mg, Daily  [Held by provider] allopurinol, 300 mg, Daily  [Held by provider] vitamin D3, 5,000 Units, Daily  [Held by provider] DULoxetine, 60 mg, Daily  [Held by provider] atorvastatin, 40 mg, Daily      FLUIDS/DRIPS:     PN-Adult Premix 5/20 - Standard Electrolytes - Central Line 83 mL/hr at 02/21/25 1033    sodium chloride      dexmedeTOMIDine HCl in NaCl 1.5 mcg/kg/hr (02/21/25 1033)    propofol 15 mcg/kg/min (02/21/25 1033)    sodium chloride      dextrose       PRNs: lidocaine, ,   sodium chloride, , PRN  midazolam, 2 mg, Q1H PRN  fentanNYL, 50 mcg, Q2H PRN  sodium chloride flush, 5-40 mL, PRN  diatrizoate meglumine-sodium, 12 mL, ONCE PRN  [Held by provider] guaiFENesin, 200 mg, Q4H PRN  benzocaine, , 4x Daily PRN  ALPRAZolam, 0.5 mg, ONCE PRN  phenol, 1 spray, Q2H PRN  sodium chloride, , PRN  ondansetron, 4 mg, Q8H PRN   Or  ondansetron,

## 2025-02-21 NOTE — PROGRESS NOTES
Patient asynchronis with ventilator, coughing, high peek pressures. Increased propofol to 7.5 mcg/kg/min    After about 5 minutes patient is now synchronis with ventilator and more calm    Electronically signed by Lindsey Booth RN on 2/21/2025 at 4:04 AM

## 2025-02-21 NOTE — PROGRESS NOTES
TPN: Day 8  Electrolytes reviewed  Mag 1.58- Mag 2gm times one, K= 3.5 (40meq)  Continue 83ml/hr  Recent Labs     02/19/25  0452 02/19/25  0458 02/20/25  0550 02/21/25  0554 02/21/25  1148     --  138 137  --    K 3.8  --  3.4* 3.5  --      --  101 99  --    CO2 33*  --  35* 34*  --    BUN 14  --  12 11  --    GLUCOSE 255*  --  244* 251*  --    CALCIUM 7.4*  --  7.3* 7.5*  --    MG 1.82  --  1.60* 1.58*  --    PHOS 2.6  --  3.0 2.7  --    TRIG 147  --   --  164*  --    WBC 3.2*  --  3.5* 4.0  --    PHART  --    < > 7.377 7.322* 7.394    < > = values in this interval not displayed.       Sabiha Cid, [facility] 2/21/2025 12:27 PM

## 2025-02-21 NOTE — PLAN OF CARE
Problem: Chronic Conditions and Co-morbidities  Goal: Patient's chronic conditions and co-morbidity symptoms are monitored and maintained or improved  Outcome: Progressing  Flowsheets (Taken 2/20/2025 1315 by Carol Ann Murillo RN)  Care Plan - Patient's Chronic Conditions and Co-Morbidity Symptoms are Monitored and Maintained or Improved:   Monitor and assess patient's chronic conditions and comorbid symptoms for stability, deterioration, or improvement   Collaborate with multidisciplinary team to address chronic and comorbid conditions and prevent exacerbation or deterioration     Problem: Discharge Planning  Goal: Discharge to home or other facility with appropriate resources  Outcome: Progressing  Flowsheets (Taken 2/21/2025 0818)  Discharge to home or other facility with appropriate resources:   Identify barriers to discharge with patient and caregiver   Arrange for needed discharge resources and transportation as appropriate     Problem: Safety - Adult  Goal: Free from fall injury  Outcome: Progressing  Note: Bed exit alarm active.

## 2025-02-21 NOTE — PROGRESS NOTES
Chavira previously placed for urinary retention.  Spoke with Dr. Palm regarding removing chavira and MD would like to keep chavira for now.

## 2025-02-21 NOTE — PROGRESS NOTES
Pt intubated and sedated.    Intubated with # 7.5 at the # 24 LL.   Vent settings are 18, 500, 35%, and 5.    Breath sounds are shallow and diminished.    Heart rhythm is sinus on the bedside monitor with rates in the 90s.   Pt has generalized edema. Upper extremities 2+ pitting and weeping. Lower extremities 2+ pitting.    Scheduled meds given per orders.    OG at 60 cm. TF running at 15 ml/hr. Pt tolerating well.    Left IJ CVC patent and working well.   Propofol infusing.  NS infusing.     Precedex infusing.  RASS -1  CPOT 0

## 2025-02-21 NOTE — PROGRESS NOTES
02/21/25 1300   RT Protocol   History Pulmonary Disease 2   Respiratory pattern 2   Breath sounds 2   Cough 1   Indications for Bronchodilator Therapy Decreased or absent breath sounds   Bronchodilator Assessment Score 7

## 2025-02-21 NOTE — PROGRESS NOTES
Progress Note    Admit Date:  1/30/2025    Interval history:  Influenza A and Acute respiratory failure   Was on bipap in iCU and improved, transferred to [facility]        Has NG placed for Ileus  , he pulled out NG and likely has aspirated became hypoxic and transferred to ICU      Seizure like event 2/7,   Remains confused  MRI brain incomplete .     2/13   Mentation worse today  On precedex   On 4 L of O2     2/21  Ct with resolving ileus  Started on TF @15 cc     Subjective:  Mr. Early seen sedated on vent no distress    No BM , abd more distended, tolerating TF    Repeat CT abd with no acute findings     Started weaning sedation   Sugars high with TPN      Objective:   Patient Vitals for the past 4 hrs:   BP Temp Temp src Pulse Resp SpO2   02/21/25 0402 (!) 140/62 98.9 °F (37.2 °C) Bladder 74 18 100 %          Intake/Output Summary (Last 24 hours) at 2/21/2025 0718  Last data filed at 2/21/2025 0430  Gross per 24 hour   Intake 4654.94 ml   Output 3475 ml   Net 1179.94 ml       Physical Exam:        General:  elderly male sedated on vent  Oral ETT and OG noted  Left IJ TLC . Appears to be not in any distress  Mucous Membranes:  Pink , anicteric  Neck: No JVD, no carotid bruit, no thyromegaly  Chest:  Clear to auscultation bilaterally, resolved wheeze  Cardiovascular:  RRR S1S2 heard, no murmurs or gallops  Abdomen:  Soft, obese +distended, non tender, no organomegaly, BS absent  Extremities: trace edema to ext improved   Distal pulses well felt  Neurological : sedated        Medications:  insulin lispro, 0-16 Units, Q4H  insulin glargine, 20 Units, BID  levETIRAcetam, 750 mg, BID  [Held by provider] dilTIAZem, 60 mg, 3 times per day  vancomycin, 1,000 mg, Q12H  [Held by provider] metoprolol tartrate, 50 mg, BID  Gabapentin, 200 mg, 4x Daily  albuterol, 2.5 mg, 4x Daily RT  fat emulsion, 250 mL, Once per day on Monday Thursday  sodium chloride flush, 5-40 mL, 2 times per day  [Held by provider] zonisamide, 100 mg,

## 2025-02-21 NOTE — PROGRESS NOTES
Shift assessment completed, see flow sheet.   RASS -2. Does not follow commands. He does open his eyes to tactile stimuli and and looks towards staff. However, he immediately closes eyes and goes back to sleep. Continues with titrated Propofol and Precedex for sedation.     Intubated and sedated on AC # 7.5 ETT, at 24 LL. 18 / 500 / 35 %/ +5.   SpO2 100%. Respirations are easy, even, and unlabored. Tolerating ventilator well.   Bilateral lung sounds Diminished throughout.     VSS  OG in place at 60 cm nare line, with TF at 15 mL/hr, 75 ml residual.      CVC/PIV, WNL with all sites patent. CHG caps on open ports.     All lines and monitoring devices in place. Castrejon is patent and secured. No dependant loops and elevated off floor.  Bilateral soft wrist restraints in place for patient safety to prevent from pulling at lines, tubing or other equipment.     Bed in lowest position with wheels locked. No needs identified at this time. Will continue to monitor.     Electronically signed by Lindsey Booth RN on 2/20/2025 at 10:50 PM

## 2025-02-21 NOTE — PLAN OF CARE
Problem: Chronic Conditions and Co-morbidities  Goal: Patient's chronic conditions and co-morbidity symptoms are monitored and maintained or improved  2/21/2025 1442 by Katiana Rodriguez RN  Outcome: Progressing  2/21/2025 0818 by Lindsey Booth RN  Outcome: Progressing  Flowsheets (Taken 2/20/2025 1315 by Carol Ann Murillo, RN)  Care Plan - Patient's Chronic Conditions and Co-Morbidity Symptoms are Monitored and Maintained or Improved:   Monitor and assess patient's chronic conditions and comorbid symptoms for stability, deterioration, or improvement   Collaborate with multidisciplinary team to address chronic and comorbid conditions and prevent exacerbation or deterioration     Problem: Discharge Planning  Goal: Discharge to home or other facility with appropriate resources  2/21/2025 1442 by Katiana Rodriguez RN  Outcome: Progressing  2/21/2025 0818 by Lindsey Booth RN  Outcome: Progressing  Flowsheets (Taken 2/21/2025 0818)  Discharge to home or other facility with appropriate resources:   Identify barriers to discharge with patient and caregiver   Arrange for needed discharge resources and transportation as appropriate     Problem: Safety - Adult  Goal: Free from fall injury  2/21/2025 1442 by Katiana Rodriguez RN  Outcome: Progressing  2/21/2025 0818 by Lindsey Booth RN  Outcome: Progressing  Note: Bed exit alarm active.      Problem: Respiratory - Adult  Goal: Achieves optimal ventilation and oxygenation  Outcome: Progressing     Problem: Cardiovascular - Adult  Goal: Maintains optimal cardiac output and hemodynamic stability  Outcome: Progressing     Problem: Skin/Tissue Integrity - Adult  Goal: Skin integrity remains intact  Description: 1.  Monitor for areas of redness and/or skin breakdown  2.  Assess vascular access sites hourly  3.  Every 4-6 hours minimum:  Change oxygen saturation probe site  4.  Every 4-6 hours:  If on nasal continuous positive airway pressure, respiratory therapy assess nares and

## 2025-02-21 NOTE — PROGRESS NOTES
02/20/25 2311   Patient Observation   Pulse 72   Respirations 20   SpO2 98 %   Breath Sounds   Breath Sounds Bilateral Diminished   Vent Information   Equipment Changed HME   Vent Mode AC/VC   Ventilator Settings   FiO2  35 %   Vt (Set, mL) 500 mL   Resp Rate (Set) 18 bpm   PEEP/CPAP (cmH2O) 5   Vent Patient Data (Readings)   Vt (Measured) 546 mL   Peak Inspiratory Pressure (cmH2O) 43 cmH2O   Rate Measured 19 br/min   Minute Volume (L/min) 10.1 Liters   Mean Airway Pressure (cmH2O) 12 cmH20   Plateau Pressure (cm H2O) 46 cm H2O   Driving Pressure 41   I:E Ratio 1:3.90   Backup Apnea On   Vent Alarm Settings   High Pressure (cmH2O) 55 cmH2O   Low Minute Volume (lpm) 4 L/min   High Minute Volume (lpm) 40 L/min   Low Exhaled Vt (ml) 250 mL   High Exhaled Vt (ml) 1000 mL   RR High (bpm) 40 br/min   Apnea (secs) 20 secs   Additional Respiratoray Assessments   Humidification Source HME   Airway Clearance   Suction ET Tube;Oral   Subglottic Suction Done Yes   Suction Device Inline suction catheter   Suction Catheter Size 14 Fr   Sputum Method Obtained Endotracheal   Sputum Amount Moderate   Sputum Color/Odor White;Yellow   Sputum Consistency Thick   ETT    Placement Date/Time: 02/14/25 1135   Present on Admission/Arrival: No  Placed By: RT  Placement Verified By: Auscultation;Colorimetric ETCO2 device;Chest X-ray  Preoxygenation: Yes  Airway Tube Size: 7.5 mm  Secured At: 24 cm  Measured From: Lips   Secured At 25 cm   Measured From Lips   ETT Placement Right   Secured By Commercial tube quispe   Site Assessment Dry   Cuff Pressure   (MOV)   Tie/Quispe Changed No

## 2025-02-21 NOTE — PROGRESS NOTES
ABG drawn from right radial x 1 attempt(s) on 35% via ETT. Sight prepped per policy and procedure. Modified Mil's test positive. Pressure held to sight after procedure for five minutes. No hematoma present. Pulse present after procedure.

## 2025-02-22 ENCOUNTER — APPOINTMENT (OUTPATIENT)
Dept: GENERAL RADIOLOGY | Age: 70
DRG: 207 | End: 2025-02-22
Payer: MEDICARE

## 2025-02-22 LAB
ANION GAP SERPL CALCULATED.3IONS-SCNC: 3 MMOL/L (ref 3–16)
BASE EXCESS BLDA CALC-SCNC: 11.9 MMOL/L (ref -3–3)
BUN SERPL-MCNC: 14 MG/DL (ref 7–20)
CALCIUM SERPL-MCNC: 7.5 MG/DL (ref 8.3–10.6)
CHLORIDE SERPL-SCNC: 97 MMOL/L (ref 99–110)
CO2 BLDA-SCNC: 38.9 MMOL/L
CO2 SERPL-SCNC: 36 MMOL/L (ref 21–32)
COHGB MFR BLDA: 0.5 % (ref 0–1.5)
CREAT SERPL-MCNC: 0.6 MG/DL (ref 0.8–1.3)
DEPRECATED RDW RBC AUTO: 16.5 % (ref 12.4–15.4)
GFR SERPLBLD CREATININE-BSD FMLA CKD-EPI: >90 ML/MIN/{1.73_M2}
GLUCOSE BLD-MCNC: 202 MG/DL (ref 70–99)
GLUCOSE BLD-MCNC: 207 MG/DL (ref 70–99)
GLUCOSE BLD-MCNC: 211 MG/DL (ref 70–99)
GLUCOSE BLD-MCNC: 213 MG/DL (ref 70–99)
GLUCOSE BLD-MCNC: 216 MG/DL (ref 70–99)
GLUCOSE BLD-MCNC: 224 MG/DL (ref 70–99)
GLUCOSE BLD-MCNC: 249 MG/DL (ref 70–99)
GLUCOSE SERPL-MCNC: 199 MG/DL (ref 70–99)
HCO3 BLDA-SCNC: 37.2 MMOL/L (ref 21–29)
HCT VFR BLD AUTO: 23.2 % (ref 40.5–52.5)
HGB BLD-MCNC: 7.6 G/DL (ref 13.5–17.5)
HGB BLDA-MCNC: 7.6 G/DL (ref 13.5–17.5)
MAGNESIUM SERPL-MCNC: 1.52 MG/DL (ref 1.8–2.4)
MCH RBC QN AUTO: 30.1 PG (ref 26–34)
MCHC RBC AUTO-ENTMCNC: 32.6 G/DL (ref 31–36)
MCV RBC AUTO: 92.3 FL (ref 80–100)
METHGB MFR BLDA: 0.3 %
O2 THERAPY: ABNORMAL
PCO2 BLDA: 55 MMHG (ref 35–45)
PERFORMED ON: ABNORMAL
PH BLDA: 7.45 [PH] (ref 7.35–7.45)
PLATELET # BLD AUTO: 255 K/UL (ref 135–450)
PMV BLD AUTO: 8 FL (ref 5–10.5)
PO2 BLDA: 75.2 MMHG (ref 75–108)
POTASSIUM SERPL-SCNC: 3.7 MMOL/L (ref 3.5–5.1)
RBC # BLD AUTO: 2.51 M/UL (ref 4.2–5.9)
SAO2 % BLDA: 95.3 %
SODIUM SERPL-SCNC: 136 MMOL/L (ref 136–145)
VANCOMYCIN TROUGH SERPL-MCNC: 15.9 UG/ML (ref 10–20)
WBC # BLD AUTO: 4.2 K/UL (ref 4–11)

## 2025-02-22 PROCEDURE — 6370000000 HC RX 637 (ALT 250 FOR IP): Performed by: INTERNAL MEDICINE

## 2025-02-22 PROCEDURE — 6360000002 HC RX W HCPCS: Performed by: INTERNAL MEDICINE

## 2025-02-22 PROCEDURE — 80202 ASSAY OF VANCOMYCIN: CPT

## 2025-02-22 PROCEDURE — 99232 SBSQ HOSP IP/OBS MODERATE 35: CPT

## 2025-02-22 PROCEDURE — 2580000003 HC RX 258: Performed by: INTERNAL MEDICINE

## 2025-02-22 PROCEDURE — 99291 CRITICAL CARE FIRST HOUR: CPT | Performed by: INTERNAL MEDICINE

## 2025-02-22 PROCEDURE — 2500000003 HC RX 250 WO HCPCS: Performed by: STUDENT IN AN ORGANIZED HEALTH CARE EDUCATION/TRAINING PROGRAM

## 2025-02-22 PROCEDURE — 94003 VENT MGMT INPAT SUBQ DAY: CPT

## 2025-02-22 PROCEDURE — 2000000000 HC ICU R&B

## 2025-02-22 PROCEDURE — 51702 INSERT TEMP BLADDER CATH: CPT

## 2025-02-22 PROCEDURE — 2500000003 HC RX 250 WO HCPCS

## 2025-02-22 PROCEDURE — 82803 BLOOD GASES ANY COMBINATION: CPT

## 2025-02-22 PROCEDURE — 85027 COMPLETE CBC AUTOMATED: CPT

## 2025-02-22 PROCEDURE — 71045 X-RAY EXAM CHEST 1 VIEW: CPT

## 2025-02-22 PROCEDURE — 94640 AIRWAY INHALATION TREATMENT: CPT

## 2025-02-22 PROCEDURE — 80048 BASIC METABOLIC PNL TOTAL CA: CPT

## 2025-02-22 PROCEDURE — 83735 ASSAY OF MAGNESIUM: CPT

## 2025-02-22 PROCEDURE — 2500000003 HC RX 250 WO HCPCS: Performed by: INTERNAL MEDICINE

## 2025-02-22 PROCEDURE — 99232 SBSQ HOSP IP/OBS MODERATE 35: CPT | Performed by: INTERNAL MEDICINE

## 2025-02-22 PROCEDURE — 94761 N-INVAS EAR/PLS OXIMETRY MLT: CPT

## 2025-02-22 PROCEDURE — 6370000000 HC RX 637 (ALT 250 FOR IP)

## 2025-02-22 PROCEDURE — 2700000000 HC OXYGEN THERAPY PER DAY

## 2025-02-22 PROCEDURE — 6360000002 HC RX W HCPCS: Performed by: STUDENT IN AN ORGANIZED HEALTH CARE EDUCATION/TRAINING PROGRAM

## 2025-02-22 RX ORDER — FUROSEMIDE 10 MG/ML
40 INJECTION INTRAMUSCULAR; INTRAVENOUS ONCE
Status: COMPLETED | OUTPATIENT
Start: 2025-02-22 | End: 2025-02-22

## 2025-02-22 RX ORDER — BISACODYL 10 MG
20 SUPPOSITORY, RECTAL RECTAL ONCE
Status: COMPLETED | OUTPATIENT
Start: 2025-02-22 | End: 2025-02-22

## 2025-02-22 RX ADMIN — FUROSEMIDE 40 MG: 10 INJECTION, SOLUTION INTRAMUSCULAR; INTRAVENOUS at 10:49

## 2025-02-22 RX ADMIN — LEVETIRACETAM 750 MG: 100 INJECTION INTRAVENOUS at 10:50

## 2025-02-22 RX ADMIN — INSULIN LISPRO 4 UNITS: 100 INJECTION, SOLUTION INTRAVENOUS; SUBCUTANEOUS at 04:34

## 2025-02-22 RX ADMIN — DEXMEDETOMIDINE HYDROCHLORIDE 1.3 MCG/KG/HR: 4 INJECTION, SOLUTION INTRAVENOUS at 21:29

## 2025-02-22 RX ADMIN — ALBUTEROL SULFATE 2.5 MG: 2.5 SOLUTION RESPIRATORY (INHALATION) at 20:00

## 2025-02-22 RX ADMIN — SODIUM CHLORIDE, PRESERVATIVE FREE 10 ML: 5 INJECTION INTRAVENOUS at 10:55

## 2025-02-22 RX ADMIN — INSULIN LISPRO 4 UNITS: 100 INJECTION, SOLUTION INTRAVENOUS; SUBCUTANEOUS at 12:10

## 2025-02-22 RX ADMIN — ASCORBIC ACID, VITAMIN A PALMITATE, CHOLECALCIFEROL, THIAMINE HYDROCHLORIDE, RIBOFLAVIN-5 PHOSPHATE SODIUM, PYRIDOXINE HYDROCHLORIDE, NIACINAMIDE, DEXPANTHENOL, ALPHA-TOCOPHEROL ACETATE, VITAMIN K1, FOLIC ACID, BIOTIN, CYANOCOBALAMIN: 200; 3300; 200; 6; 3.6; 6; 40; 15; 10; 150; 600; 60; 5 INJECTION, SOLUTION INTRAVENOUS at 18:24

## 2025-02-22 RX ADMIN — DEXMEDETOMIDINE HYDROCHLORIDE 1.5 MCG/KG/HR: 4 INJECTION, SOLUTION INTRAVENOUS at 12:20

## 2025-02-22 RX ADMIN — INSULIN LISPRO 4 UNITS: 100 INJECTION, SOLUTION INTRAVENOUS; SUBCUTANEOUS at 00:30

## 2025-02-22 RX ADMIN — GABAPENTIN 200 MG: 250 SOLUTION ORAL at 10:28

## 2025-02-22 RX ADMIN — INSULIN GLARGINE 20 UNITS: 100 INJECTION, SOLUTION SUBCUTANEOUS at 10:56

## 2025-02-22 RX ADMIN — GABAPENTIN 200 MG: 250 SOLUTION ORAL at 20:33

## 2025-02-22 RX ADMIN — INSULIN LISPRO 4 UNITS: 100 INJECTION, SOLUTION INTRAVENOUS; SUBCUTANEOUS at 16:49

## 2025-02-22 RX ADMIN — SODIUM CHLORIDE, PRESERVATIVE FREE 10 ML: 5 INJECTION INTRAVENOUS at 20:34

## 2025-02-22 RX ADMIN — DEXMEDETOMIDINE HYDROCHLORIDE 1.5 MCG/KG/HR: 4 INJECTION, SOLUTION INTRAVENOUS at 03:07

## 2025-02-22 RX ADMIN — VANCOMYCIN 1250 MG: 1.75 INJECTION, SOLUTION INTRAVENOUS at 05:54

## 2025-02-22 RX ADMIN — ENOXAPARIN SODIUM 40 MG: 100 INJECTION SUBCUTANEOUS at 10:49

## 2025-02-22 RX ADMIN — DEXMEDETOMIDINE HYDROCHLORIDE 1.5 MCG/KG/HR: 4 INJECTION, SOLUTION INTRAVENOUS at 15:02

## 2025-02-22 RX ADMIN — ALBUTEROL SULFATE 2.5 MG: 2.5 SOLUTION RESPIRATORY (INHALATION) at 15:47

## 2025-02-22 RX ADMIN — GABAPENTIN 200 MG: 250 SOLUTION ORAL at 17:18

## 2025-02-22 RX ADMIN — BISACODYL 20 MG: 10 SUPPOSITORY RECTAL at 10:49

## 2025-02-22 RX ADMIN — VANCOMYCIN 1250 MG: 1.75 INJECTION, SOLUTION INTRAVENOUS at 18:26

## 2025-02-22 RX ADMIN — INSULIN LISPRO 4 UNITS: 100 INJECTION, SOLUTION INTRAVENOUS; SUBCUTANEOUS at 20:33

## 2025-02-22 RX ADMIN — INSULIN LISPRO 4 UNITS: 100 INJECTION, SOLUTION INTRAVENOUS; SUBCUTANEOUS at 10:55

## 2025-02-22 RX ADMIN — DEXMEDETOMIDINE HYDROCHLORIDE 1.5 MCG/KG/HR: 4 INJECTION, SOLUTION INTRAVENOUS at 05:52

## 2025-02-22 RX ADMIN — LEVETIRACETAM 750 MG: 100 INJECTION INTRAVENOUS at 20:33

## 2025-02-22 RX ADMIN — THIAMINE HYDROCHLORIDE 200 MG: 100 INJECTION, SOLUTION INTRAMUSCULAR; INTRAVENOUS at 11:05

## 2025-02-22 RX ADMIN — ALBUTEROL SULFATE 2.5 MG: 2.5 SOLUTION RESPIRATORY (INHALATION) at 11:20

## 2025-02-22 RX ADMIN — METOPROLOL TARTRATE 50 MG: 50 TABLET, FILM COATED ORAL at 20:32

## 2025-02-22 RX ADMIN — ALBUTEROL SULFATE 2.5 MG: 2.5 SOLUTION RESPIRATORY (INHALATION) at 08:07

## 2025-02-22 RX ADMIN — DEXMEDETOMIDINE HYDROCHLORIDE 1.5 MCG/KG/HR: 4 INJECTION, SOLUTION INTRAVENOUS at 18:17

## 2025-02-22 RX ADMIN — METOPROLOL TARTRATE 50 MG: 50 TABLET, FILM COATED ORAL at 10:49

## 2025-02-22 RX ADMIN — PROPOFOL 15 MCG/KG/MIN: 10 INJECTION, EMULSION INTRAVENOUS at 05:53

## 2025-02-22 RX ADMIN — GABAPENTIN 200 MG: 250 SOLUTION ORAL at 12:57

## 2025-02-22 RX ADMIN — PANTOPRAZOLE SODIUM 40 MG: 40 INJECTION, POWDER, LYOPHILIZED, FOR SOLUTION INTRAVENOUS at 10:54

## 2025-02-22 RX ADMIN — DEXMEDETOMIDINE HYDROCHLORIDE 1.5 MCG/KG/HR: 4 INJECTION, SOLUTION INTRAVENOUS at 08:55

## 2025-02-22 RX ADMIN — INSULIN GLARGINE 20 UNITS: 100 INJECTION, SOLUTION SUBCUTANEOUS at 20:33

## 2025-02-22 RX ADMIN — POTASSIUM BICARBONATE 20 MEQ: 782 TABLET, EFFERVESCENT ORAL at 10:49

## 2025-02-22 RX ADMIN — MAGNESIUM SULFATE HEPTAHYDRATE 2000 MG: 40 INJECTION, SOLUTION INTRAVENOUS at 06:22

## 2025-02-22 ASSESSMENT — PULMONARY FUNCTION TESTS
PIF_VALUE: 33
PIF_VALUE: 35
PIF_VALUE: 36
PIF_VALUE: 38
PIF_VALUE: 35
PIF_VALUE: 38
PIF_VALUE: 39
PIF_VALUE: 40
PIF_VALUE: 40
PIF_VALUE: 38
PIF_VALUE: 39
PIF_VALUE: 36
PIF_VALUE: 38
PIF_VALUE: 44
PIF_VALUE: 38
PIF_VALUE: 17

## 2025-02-22 NOTE — PROCEDURES
PROCEDURE NOTE  Date: 2/21/2025   Name: Sandor Early  YOB: 1955    Procedures      BROCHOSCOPY PROCEDURE NOTE    DATE OF PROCEDURE: 2/21 / 2025    INDICATIONS & HISTORY:  Abnormal CXR     PREOPERATIVE DIAGNOSIS:    same    POSTOPERATIVE DIAGNOSES:  Pneumonia      PROCEDURE PERFORMED:   1-Diagnotic, Fiberoptic Bronchoscopy  2-Bronchial alveolar lavage from RLL               SURGEON:    Todd Joyce     ASSISTANT:   Bronchoscopy Nursing/Technical Team/OR Team as available i    SEDATION:     Regional Anesthesia,       ANESTHESIA:    Lidocaine 2% ,       ANESTHESIOLOGIST:  Per EPIC Note    SPECIMENS:  [x] Bronchial sample(s) for      Fungal smear & culture,   Acid-fast bacillus Smear and Culture,    Gram stain, C&S,    PCP               Cytology               BD glucan              Galactomanin      Description of Procedure:     The patient  identified Sandor Early  and the procedure verified as Flexible Fiberoptic Bronchoscopy with BAL         After the patient was controlled with sedation bronchoscope was introduced through ET tubewithout difficulty. The scope was then passed into the trachea.  Additional 2% lidocaine was used topically within the airway.  Careful inspection of the tracheal lumen was accomplished. The scope was sequentially passed into all bronchial airways.           Endobronchial findings:   Vocal cord *not seen  Trachea:  Normal mucosa, he the part seen outside the ET tube  Evelyn  Normal mucosa     Right Main Stem Bronchus  Normal mucosa thick secretion  Right Upper Lobe Bronchi Normal mucosa mild secretions  Right Middle Lobe Bronchi  Normal mucosa mild secretions   Right Lower Lobe Bronchi (including the Superior segment)  Normal mucosa     Left Main Stem Bronchus Normal mucosa  Left Upper Lobe Bronchus, Upper Division Normal mucosa  Left Upper Lobe Bronchus, Lingula  Normal mucosa   Left Lower Lobe Bronchus (including the Superior segment)   mild amount secretions            BAL : RLL    COMPLICATIONS:  Estimated blood loss less than 3 CC          RECOMMENDATIONS:   The Patient was in good condition ,monitor vitals     Await final test/sample results.        Todd Joyce MD

## 2025-02-22 NOTE — PROGRESS NOTES
Patient is on vent settings ACVC,FIO2-35%,TV-500,RR-22,PEEP-5 and is sedated on propofol and precedex.  He has bilateral soft wrist restraints for safety,has an NG feeding on TF and TPN,has a chavira for urine output,generalised pitting oedema ,will continue to monitor patient.

## 2025-02-22 NOTE — PROGRESS NOTES
02/21/25 2000   RT Protocol   History Pulmonary Disease 2   Respiratory pattern 2   Breath sounds 2   Cough 1   Indications for Bronchodilator Therapy Decreased or absent breath sounds   Bronchodilator Assessment Score 7     RT Inhaler-Nebulizer Bronchodilator Protocol Note    There is a bronchodilator order in the chart from a provider indicating to follow the RT Bronchodilator Protocol and there is an “Initiate RT Inhaler-Nebulizer Bronchodilator Protocol” order as well (see protocol at bottom of note).    CXR Findings:  XR CHEST PORTABLE    Result Date: 2/21/2025  Interstitial and hazy opacities in the right lower lung more than left are still present. Endotracheal tube, nasogastric tube, and left jugular line.     XR CHEST PORTABLE    Result Date: 2/20/2025  Overall stable chest without significant interval change.     XR CHEST PORTABLE    Result Date: 2/20/2025  Endotracheal tube, nasogastric tube, and left jugular line are stable.  NG side port is near the GE junction and could be advanced by 5-10 cm if desired. Stable appearance of the lungs.       The findings from the last RT Protocol Assessment were as follows:   History Pulmonary Disease: Chronic pulmonary disease  Respiratory Pattern: Dyspnea on exertion or RR 21-25 bpm  Breath Sounds: Slightly diminished and/or crackles  Cough: Strong, productive  Indication for Bronchodilator Therapy: Decreased or absent breath sounds  Bronchodilator Assessment Score: 7    Aerosolized bronchodilator medication orders have been revised according to the RT Inhaler-Nebulizer Bronchodilator Protocol below.    Respiratory Therapist to perform RT Therapy Protocol Assessment initially then follow the protocol.  Repeat RT Therapy Protocol Assessment PRN for score 0-3 or on second treatment, BID, and PRN for scores above 3.    No Indications - adjust the frequency to every 6 hours PRN wheezing or bronchospasm, if no treatments needed after 48 hours then discontinue using Per  Protocol order mode.     If indication present, adjust the RT bronchodilator orders based on the Bronchodilator Assessment Score as indicated below.  Use Inhaler orders unless patient has one or more of the following: on home nebulizer, not able to hold breath for 10 seconds, is not alert and oriented, cannot activate and use MDI correctly, or respiratory rate 25 breaths per minute or more, then use the equivalent nebulizer order(s) with same Frequency and PRN reasons based on the score.  If a patient is on this medication at home then do not decrease Frequency below that used at home.    0-3 - enter or revise RT bronchodilator order(s) to equivalent RT Bronchodilator order with Frequency of every 4 hours PRN for wheezing or increased work of breathing using Per Protocol order mode.        4-6 - enter or revise RT Bronchodilator order(s) to two equivalent RT bronchodilator orders with one order with BID Frequency and one order with Frequency of every 4 hours PRN wheezing or increased work of breathing using Per Protocol order mode.        7-10 - enter or revise RT Bronchodilator order(s) to two equivalent RT bronchodilator orders with one order with TID Frequency and one order with Frequency of every 4 hours PRN wheezing or increased work of breathing using Per Protocol order mode.       11-13 - enter or revise RT Bronchodilator order(s) to one equivalent RT bronchodilator order with QID Frequency and an Albuterol order with Frequency of every 4 hours PRN wheezing or increased work of breathing using Per Protocol order mode.      Greater than 13 - enter or revise RT Bronchodilator order(s) to one equivalent RT bronchodilator order with every 4 hours Frequency and an Albuterol order with Frequency of every 2 hours PRN wheezing or increased work of breathing using Per Protocol order mode.       Electronically signed by Emory Jurado RCP on 2/21/2025 at 8:31 PM

## 2025-02-22 NOTE — PROGRESS NOTES
General Surgery  Daily Progress Note    Pt Name: Sandor Early  Medical Record Number: [id number]  Date of Birth 1955   Today's Date: 2/22/2025  Admit date: 1/30/2025  LOS: Day 23    SUBJECTIVE  Intubated and sedated.      OBJECTIVE  Vitals:    02/22/25 0500 02/22/25 0600 02/22/25 0700 02/22/25 0800   BP: (!) 130/48 (!) 130/50 (!) 136/52 (!) 133/50   Pulse: 74 75 73 72   Resp: 22 22 22 22   Temp:   100.2 °F (37.9 °C)    TempSrc:   Bladder    SpO2: 96% 96% 96% 95%   Weight:       Height:           Gen: Sedated  Resp: Intubated.  CV: Regular rate. Regular rhythm.   GI: No pain response with palpation. +Softly distended, similar to yesterday exam, ?protuberance rather than true distension. +Increasing BS.   Skin: Warm and dry. No nodule or rash on exposed extremities.       I/O last 3 completed shifts:  In: 6280.7 [I.V.:1396.7; NG/GT:941; IV Piggyback:791.8]  Out: 6525 [Urine:6525]  No intake/output data recorded.    LABS  CBC:   Recent Labs     02/20/25  0550 02/21/25  0554 02/22/25  0514   WBC 3.5* 4.0 4.2   HGB 7.7* 7.9* 7.6*   HCT 23.1* 23.8* 23.2*   MCV 92.5 92.9 92.3    266 255     BMP:   Recent Labs     02/20/25  0550 02/21/25  0554 02/22/25  0514    137 136   K 3.4* 3.5 3.7    99 97*   CO2 35* 34* 36*   PHOS 3.0 2.7  --    BUN 12 11 14   CREATININE 0.7* 0.6* 0.6*     LIVER PROFILE:   Recent Labs     02/21/25  0554   AST 24   ALT 15   BILIDIR <0.1   BILITOT <0.2   ALKPHOS 224*       PT/INR:   No results for input(s): \"PROTIME\", \"INR\" in the last 72 hours.    APTT: No results for input(s): \"APTT\" in the last 72 hours.  UA:  No results for input(s): \"NITRITE\", \"COLORU\", \"PHUR\", \"LABCAST\", \"WBCUA\", \"RBCUA\", \"MUCUS\", \"TRICHOMONAS\", \"YEAST\", \"BACTERIA\", \"CLARITYU\", \"SPECGRAV\", \"LEUKOCYTESUR\", \"UROBILINOGEN\", \"BILIRUBINUR\", \"BLOODU\", \"GLUCOSEU\", \"AMORPHOUS\" in the last 72 hours.    Invalid input(s): \"KETONESU\"        IMAGING  XR CHEST PORTABLE   Final Result   Increasing basilar opacities

## 2025-02-22 NOTE — PROGRESS NOTES
TPN: Day 9  Labs reviewed  Mag 1.52- Mag 2 grams IVPB X one given, K = 3.7 (Effer-K 20 mEq given)  Continue same TPN at 83ml/hr  Recent Labs     02/20/25  0550 02/21/25  0554 02/21/25  1148 02/22/25  0514 02/22/25  0515    137  --  136  --    K 3.4* 3.5  --  3.7  --     99  --  97*  --    CO2 35* 34*  --  36*  --    BUN 12 11  --  14  --    GLUCOSE 244* 251*  --  199*  --    CALCIUM 7.3* 7.5*  --  7.5*  --    MG 1.60* 1.58*  --  1.52*  --    PHOS 3.0 2.7  --   --   --    TRIG  --  164*  --   --   --    WBC 3.5* 4.0  --  4.2  --    PHART 7.377 7.322*   < >  --  7.448    < > = values in this interval not displayed.

## 2025-02-22 NOTE — PLAN OF CARE
Problem: Respiratory - Adult  Goal: Achieves optimal ventilation and oxygenation  Outcome: Progressing     Problem: Cardiovascular - Adult  Goal: Maintains optimal cardiac output and hemodynamic stability  Outcome: Progressing     Problem: Safety - Medical Restraint  Goal: Remains free of injury from restraints (Restraint for Interference with Medical Device)  Description: INTERVENTIONS:  1. Determine that other, less restrictive measures have been tried or would not be effective before applying the restraint  2. Evaluate the patient's condition at the time of restraint application  3. Inform patient/family regarding the reason for restraint  4. Q2H: Monitor safety, psychosocial status, comfort, nutrition and hydration  Recent Flowsheet Documentation  Taken 2/22/2025 1600 by Vitaly Tong RN  Remains free of injury from restraints (restraint for interference with medical device): Every 2 hours: Monitor safety, psychosocial status, comfort, nutrition and hydration  Taken 2/22/2025 1400 by Vitaly Tong RN  Remains free of injury from restraints (restraint for interference with medical device): Every 2 hours: Monitor safety, psychosocial status, comfort, nutrition and hydration  Taken 2/22/2025 1200 by Vitaly Tong RN  Remains free of injury from restraints (restraint for interference with medical device): Every 2 hours: Monitor safety, psychosocial status, comfort, nutrition and hydration  Taken 2/22/2025 1000 by Vitaly Tong RN  Remains free of injury from restraints (restraint for interference with medical device): Every 2 hours: Monitor safety, psychosocial status, comfort, nutrition and hydration  Taken 2/22/2025 0800 by Vitaly Tong RN  Remains free of injury from restraints (restraint for interference with medical device): Every 2 hours: Monitor safety, psychosocial status, comfort, nutrition and hydration  Taken 2/22/2025 0600 by Kylie Ramos RN  Remains free of injury from restraints  status, comfort, nutrition and hydration  Taken 2/22/2025 0600 by Kylie Ramos RN  Remains free of injury from restraints (restraint for interference with medical device): Every 2 hours: Monitor safety, psychosocial status, comfort, nutrition and hydration  Taken 2/22/2025 0400 by Kylie Ramos RN  Remains free of injury from restraints (restraint for interference with medical device): Every 2 hours: Monitor safety, psychosocial status, comfort, nutrition and hydration

## 2025-02-22 NOTE — FLOWSHEET NOTE
02/22/25 0700   Vitals   Temp 100.2 °F (37.9 °C)   Temp Source Bladder   Pulse 73   Heart Rate Source Monitor   Respirations 22   BP (!) 136/52   MAP (Calculated) 80   MAP (mmHg) 74   BP Method Automatic   Oxygen Therapy   SpO2 96 %   O2 Device Ventilator   FiO2  35 %     Vital signs stable. Shift assessment, completed, see flow sheet. RASS -2. Pt arouses to pain but is unable to follow commands. Intubated and sedated with # 7 ETT, at 25 LL. 22 / 500 / 35 %  / 5. NSR, Respirations are easy, even, and unlabored. Bilateral lung sounds with scattered rhonchi and diminished bases. SBT in process. NG remains secure in place to L nare. TF on hold for SBT.  Bowel sounds hypoactive. L IJ triple lumen CVC WNL, Precedex continues at 1.5 mcg/kg/hr, Propofol off for SBT. TPN continues at previous rate. Castrejon in place and draining clear yellow urine. Call light within reach. Bed in lowest position. Bed alarm on. Will continue to monitor

## 2025-02-22 NOTE — CARE COORDINATION
Patient remains on vent.  Per Radha MEEKS - not ready to extubate yet.     Plan is to go to [facility]. Precert .  Will need new precert when ready.

## 2025-02-22 NOTE — PLAN OF CARE
Problem: Chronic Conditions and Co-morbidities  Goal: Patient's chronic conditions and co-morbidity symptoms are monitored and maintained or improved  2/21/2025 2308 by Kylie Ramos RN  Outcome: Progressing  2/21/2025 1442 by Katiana Rodriguez RN  Outcome: Progressing     Problem: Discharge Planning  Goal: Discharge to home or other facility with appropriate resources  2/21/2025 2308 by Kylie Ramos RN  Outcome: Progressing  2/21/2025 1442 by Katiana Rodriguez RN  Outcome: Progressing     Problem: Safety - Adult  Goal: Free from fall injury  2/21/2025 2308 by Kylie Ramos RN  Outcome: Progressing  2/21/2025 1442 by Katiana Rodriguez RN  Outcome: Progressing     Problem: Pain  Goal: Verbalizes/displays adequate comfort level or baseline comfort level  Outcome: Progressing     Problem: Respiratory - Adult  Goal: Achieves optimal ventilation and oxygenation  2/21/2025 2308 by Kylie Ramos RN  Outcome: Progressing  2/21/2025 1442 by Katiana Rodriguez RN  Outcome: Progressing     Problem: Cardiovascular - Adult  Goal: Maintains optimal cardiac output and hemodynamic stability  2/21/2025 2308 by Kylie Ramos RN  Outcome: Progressing  2/21/2025 1442 by Katiana Rodriguez RN  Outcome: Progressing  Goal: Absence of cardiac dysrhythmias or at baseline  Outcome: Progressing     Problem: Skin/Tissue Integrity - Adult  Goal: Skin integrity remains intact  Description: 1.  Monitor for areas of redness and/or skin breakdown  2.  Assess vascular access sites hourly  3.  Every 4-6 hours minimum:  Change oxygen saturation probe site  4.  Every 4-6 hours:  If on nasal continuous positive airway pressure, respiratory therapy assess nares and determine need for appliance change or resting period  2/21/2025 2308 by Kylie Ramos RN  Outcome: Progressing  2/21/2025 1442 by Katiana Rodriguez RN  Outcome: Progressing  Goal: Oral mucous membranes remain intact  2/21/2025 2308 by Kylie Ramos RN  Outcome:

## 2025-02-22 NOTE — PROGRESS NOTES
[facility]                            Critical care Progress Note :      CC: COPD respiratory failure  Flu     Events of Last 24 hours:   On SBT 10/5  Olguin to pain   Low grade fever   Propofol 15   Precedex 1   22/500/35/5  ABG stable   Swelling hands  FiO2 40%  Minimal secretions   Small BM last night   TPN per surgery,started trophic feeding  Surgery following   Had CT scan   High peak pressure this am,normal plt     Vascular lines: IV: Left IJ     MV:   Vent Mode: AC/VC Resp Rate (Set): 22 bpm/Vt (Set, mL): 500 mL/ /FiO2 : 35 %  Recent Labs     25  1148 25  0515   PHART 7.394 7.448   TUI6FUI 57.3* 55.0*   PO2ART 103.7 75.2       IV:   PN-Adult Premix  - Standard Electrolytes - Central Line 83 mL/hr at 25 0611    sodium chloride      dexmedeTOMIDine HCl in NaCl 1.5 mcg/kg/hr (25 0855)    propofol 15 mcg/kg/min (25 0611)    sodium chloride      dextrose         Vitals:  Blood pressure (!) 137/50, pulse 89, temperature 100.2 °F (37.9 °C), temperature source Bladder, resp. rate 23, height 1.702 m (5' 7.01\"), weight 102.2 kg (225 lb 5 oz), SpO2 94%.  on   Temp  Av.5 °F (37.5 °C)  Min: 98.4 °F (36.9 °C)  Max: 100.2 °F (37.9 °C)    Intake/Output Summary (Last 24 hours) at 2025 0925  Last data filed at 2025 0611  Gross per 24 hour   Intake 4493.93 ml   Output 4700 ml   Net -206.07 ml     EXAM:  General: ill appearing.  Intubated sedated.  Eyes: No sclera icterus. No conjunctival injection.  ENT: No discharge. ET tube in place  Neck: Trachea midline.   Resp: No accessory muscle use. + crackles. No wheezing. + rhonchi.   CV: tachy rate. Regular rhythm. No mumur or rub. No edema.   GI: Non-tender. Non-distended.   Skin: Warm and dry. No rash on exposed extremities.  M/S: No cyanosis. No clubbing.   Neuro: Intubated sedated.  Not following commands.  Psych: Noncommunicative unable to obtain        Scheduled

## 2025-02-22 NOTE — PROGRESS NOTES
02/21/25 2000   Patient Observation   Pulse 70   Respirations 22   SpO2 98 %   Patient Observations 7.5 ett 25 at lip   Breath Sounds   Right Upper Lobe Diminished   Right Middle Lobe Diminished   Right Lower Lobe Diminished   Left Upper Lobe Diminished   Left Lower Lobe Diminished   Vent Information   Vent Mode AC/VC   Ventilator Settings   FiO2  35 %   Vt (Set, mL) 500 mL   Resp Rate (Set) 22 bpm   PEEP/CPAP (cmH2O) 5   Vent Patient Data (Readings)   Vt (Measured) 566 mL   Peak Inspiratory Pressure (cmH2O) 36 cmH2O   Rate Measured 22 br/min   Minute Volume (L/min) 12.4 Liters   Mean Airway Pressure (cmH2O) 12 cmH20   Plateau Pressure (cm H2O) 28 cm H2O   Driving Pressure 23   I:E Ratio 1:3.00   Flow Sensitivity 3 L/min   Static Compliance (L/cm H2O) 29   Dynamic Compliance (L/cm H2O) 16 L/cm H2O   Vent Alarm Settings   High Pressure (cmH2O) 55 cmH2O   Low Minute Volume (lpm) 4 L/min   Low Exhaled Vt (ml) 250 mL   RR High (bpm) 40 br/min   Apnea (secs) 20 secs   Additional Respiratoray Assessments   Humidification Source Heated wire   Humidification Temp 36.9   Circuit Condensation Drained   Ambu Bag With Mask At Bedside Yes   Airway Clearance   Suction ET Tube   Suction Device Inline suction catheter   Sputum Method Obtained Endotracheal   Sputum Amount Small   Sputum Color/Odor Tan;Bloody   Sputum Consistency Thick   ETT    Placement Date/Time: 02/14/25 1135   Present on Admission/Arrival: No  Placed By: RT  Placement Verified By: Auscultation;Colorimetric ETCO2 device;Chest X-ray  Preoxygenation: Yes  Airway Tube Size: 7.5 mm  Secured At: 24 cm  Measured From: Lips   Secured At 25 cm   Measured From Lips   ETT Placement Left   Secured By Commercial tube may   Treatment   $Treatment Type $Inhaled Therapy/Meds

## 2025-02-22 NOTE — PROGRESS NOTES
Comprehensive Nutrition Assessment    Type and Reason for Visit:  Consult, Nutrition support    Nutrition Recommendations/Plan      Goal Tube Feeding (TF) Orders:  Feeding Route: Nasogastric  Formula: Diabetic  Schedule: Continuous  Feeding Regimen: Goal Rate for Glucerna 45 ml/hr  Additives/Modulars: None  Water Flushes: 30 mlq 3 hr  Current TF Provides: at 40 ml/hr = ; 1200 kcal, 607 ml free water plus + 240ml form flush    Taina Miramontes RD, LD  Contact: [phone]

## 2025-02-22 NOTE — PROGRESS NOTES
Progress Note    Admit Date:  1/30/2025    Interval history:  Influenza A and Acute respiratory failure   Was on bipap in iCU and improved, transferred to [facility]        Has NG placed for Ileus  , he pulled out NG and likely has aspirated became hypoxic and transferred to ICU      Seizure like event 2/7,   Remains confused  MRI brain incomplete .     2/13   Mentation worse today  On precedex   On 4 L of O2     2/21  Ct with resolving ileus  Started on TF @15 cc   Started weaning sedation  Repeat CT abd with no acute findings     2/22    S/p bronch yesterday.    Subjective:  Mr. Early sedated on the vent.       Objective:   Patient Vitals for the past 4 hrs:   BP Temp Temp src Pulse Resp SpO2   02/22/25 1100 (!) 163/62 99.7 °F (37.6 °C) Bladder 97 29 90 %   02/22/25 1000 (!) 140/55 -- -- 81 22 94 %   02/22/25 0900 (!) 137/50 -- -- 89 23 94 %   02/22/25 0800 (!) 133/50 -- -- 72 22 95 %          Intake/Output Summary (Last 24 hours) at 2/22/2025 1147  Last data filed at 2/22/2025 1013  Gross per 24 hour   Intake 3519.42 ml   Output 5150 ml   Net -1630.58 ml       Physical Exam:        General:  elderly male sedated on vent  Oral ETT and OG noted  Left IJ TLC . Appears to be not in any distress  Mucous Membranes:  Pink , anicteric  Neck: No JVD, no carotid bruit, no thyromegaly  Chest:  Clear to auscultation bilaterally, resolved wheeze  Cardiovascular:  RRR S1S2 heard, no murmurs or gallops  Abdomen:  Soft, obese +distended, non tender, no organomegaly, BS absent  Extremities: trace edema to ext improved   Distal pulses well felt  Neurological : sedated        Medications:  vancomycin, 1,250 mg, Q12H  insulin lispro, 0-16 Units, Q4H  insulin glargine, 20 Units, BID  levETIRAcetam, 750 mg, BID  [Held by provider] dilTIAZem, 60 mg, 3 times per day  metoprolol tartrate, 50 mg, BID  Gabapentin, 200 mg, 4x Daily  albuterol, 2.5 mg, 4x Daily RT  fat emulsion, 250 mL, Once per day on Monday Thursday  sodium chloride flush,    lower lobe bronchi. This may be related to aspiration.   5. Cirrhosis.   6. Aorto bi-iliac graft is patent.         XR ABDOMEN (KUB) (SINGLE AP VIEW)   Final Result   Worsening small bowel obstruction.         XR ABDOMEN (2 VIEWS)   Final Result   Gastric distension with a few mildly dilated loops of small bowel in the left   upper abdominal quadrant.  Findings could represent gastroenteritis or   partial small bowel obstruction.         XR CHEST PORTABLE   Final Result   No acute process.         XR CHEST PORTABLE    (Results Pending)     ECHO    Left Ventricle: Normal left ventricular systolic function with a visually estimated EF of 55 - 60%. Left ventricle size is normal. Mildly increased wall thickness. Findings consistent with concentric remodeling. Paradoxical septal motion. There are regional wall motion abnormalities with paradoxical septal motion. Grade I diastolic dysfunction with normal LAP.    Right Ventricle: Right ventricle is mildly dilated. Normal systolic function.    Aortic Valve: Not well visualized. Mild sclerosis of the aortic valve cusps. No regurgitation. No stenosis.    Mitral Valve: Mild annular calcification. No regurgitation. No stenosis noted.    Tricuspid Valve: Not well visualized. Trace regurgitation. Unable to assess RVSP due to inadequate or insignificant tricuspid regurgitation.    Right Atrium: Right atrium is mildly dilated.    Image quality is technically difficult. Limited study due to patient's condition, patient's ability to tolerate test and procedure performed with the patient in a supine position.    Sputum- MRSA     Assessment/Plan:    Acute on chronic hypoxic and hypercarbic resp failure   Sec  to Influenza A and COPD exacerbation  Placed on BiPAP and admitted to the ICU  Used BiPAP on admission and improved some  Was weaned to 4L   Pred taper -and tamiflu completed     Resp failure worsened with development of SBO and   --> now intubated requiring mechanical

## 2025-02-22 NOTE — PROGRESS NOTES
4 Eyes Skin Assessment     NAME:  Sandor Early  YOB: 1955  MEDICAL RECORD NUMBER:  [id number]    The patient is being assessed for  Shift Handoff    I agree that at least one RN has performed a thorough Head to Toe Skin Assessment on the patient. ALL assessment sites listed below have been assessed.      Areas assessed by both nurses:    Head, Face, Ears, Shoulders, Back, Chest, Arms, Elbows, Hands, Sacrum. Buttock, Coccyx, Ischium, Legs. Feet and Heels, and Under Medical Devices         Does the Patient have a Wound? No noted wound(s)       Abraham Prevention initiated by RN: Yes  Wound Care Orders initiated by RN: No    Pressure Injury (Stage 3,4, Unstageable, DTI, NWPT, and Complex wounds) if present, place Wound referral order by RN under : No    New Ostomies, if present place, Ostomy referral order under : No     Nurse 1 eSignature: Electronically signed by Kylie Ramos RN on 2/22/25 at 5:26 AM EST    **SHARE this note so that the co-signing nurse can place an eSignature**    Nurse 2 eSignature: Electronically signed by Dominga Sorensen RN on 2/22/25 at 5:28 AM EST

## 2025-02-22 NOTE — PROGRESS NOTES
Shift assessment done,patient is intubated,with an ETT size 7.5 secured at lipline 25cm,on vent settings ACVC,RR-22,TV-500,FIO2-35%,PEEP-5,has diminished breath sounds bilaterally.  He has bilateral wrist restraints for safety.  He is sedated on propofol 15mcg/kg/min and dexmed 1.5mcg/kg/hr.  He is on TPN at 83mls/hr,has an NG secured with a briddle,feeding on TF Glucerna 1.5 at 15mls/hr and water flush 30mls Q3hrs.  He has a chavira,urine noted to be yellow in colour.  Bed is at its lowest level,restraints on,will continue to monitor patient.

## 2025-02-22 NOTE — PROGRESS NOTES
Progress Note    Patient Sandor Early  MRN: [id number]  YOB: 1955 Age: 69 y.o. Sex: male  Room: [address] [place] [address]       Admitting Physician: Luda Richey MD   Date of Admission: 1/30/2025  5:28 PM   Primary Care Physician: Juliet Mayorga MD     Subjective:  Sandor Early was seen and examined. We are following for small bowel obstruction patient appears to be tolerating tube feeds..  -- Patient with a small bowel movement.    ROS:  Constitutional: Denies fever, no change in appetite  Respiratory: Denies cough or shortness of breath  Cardiovascular: Denies chest pain or edema    Objective:  Vital Signs:   Vitals:    02/22/25 1800   BP: 139/61   Pulse: 79   Resp: 22   Temp:    SpO2: 97%         Physical Exam:  Constitutional: Intubated on the vent HEENT: Sclera anicteric, mucosal membranes moist  Cardiovascular: Regular rate and rhythm.  No murmurs.  Respiratory: Respirations nonlabored, no crepitus  GI: Abdomen nondistended, soft, and nontender.  Normal active bowel sounds.  No masses palpable.   Rectal: Deferred  Musculoskeletal:  No pitting edema of the lower legs.  Neurological: Sedated    Intake/Output:    Intake/Output Summary (Last 24 hours) at 2/22/2025 1854  Last data filed at 2/22/2025 1606  Gross per 24 hour   Intake 2131.1 ml   Output 4150 ml   Net -2018.9 ml        Current Medications:  Current Facility-Administered Medications   Medication Dose Route Frequency Provider Last Rate Last Admin    PN-Adult Premix 5/20 - Standard Electrolytes - Central Line   IntraVENous Continuous TPN Adrián Maria PA 83 mL/hr at 02/22/25 1824 New Bag at 02/22/25 1824    vancomycin (VANCOCIN) 1250 mg in 250 mL IVPB  1,250 mg IntraVENous Q12H Todd Joyce .7 mL/hr at 02/22/25 1826 1,250 mg at 02/22/25 1826    insulin lispro (HUMALOG,ADMELOG) injection vial 0-16 Units  0-16 Units SubCUTAneous Q4H Todd Joyce MD   4 Units at 02/22/25 1649    insulin glargine (LANTUS) injection vial 20  Patient refusing fall precautions. Educated on the importance of fall precautions and when to call for help. Call light and room phone within reach.    content of this document, please contact the author as some errors in translation may have occurred.

## 2025-02-22 NOTE — PROGRESS NOTES
02/22/25 0312   Patient Observation   Pulse 75   Respirations 22   SpO2 97 %   Breath Sounds   Right Upper Lobe Diminished   Right Middle Lobe Diminished   Right Lower Lobe Diminished   Left Upper Lobe Diminished   Left Lower Lobe Diminished   Vent Information   Vent Mode AC/VC   Ventilator Settings   FiO2  35 %   Vt (Set, mL) 500 mL   Resp Rate (Set) 22 bpm   PEEP/CPAP (cmH2O) 5   Vent Patient Data (Readings)   Vt (Measured) 567 mL   Peak Inspiratory Pressure (cmH2O) 36 cmH2O   Rate Measured 22 br/min   Minute Volume (L/min) 12.5 Liters   Mean Airway Pressure (cmH2O) 12 cmH20   Plateau Pressure (cm H2O) 24 cm H2O   Driving Pressure 19   I:E Ratio 1:3.00   Flow Sensitivity 3 L/min   Vent Alarm Settings   High Pressure (cmH2O) 55 cmH2O   Low Minute Volume (lpm) 4 L/min   Low Exhaled Vt (ml) 250 mL   RR High (bpm) 40 br/min   Apnea (secs) 20 secs   Additional Respiratoray Assessments   Humidification Source Heated wire   Humidification Temp 37   Circuit Condensation Drained   Ambu Bag With Mask At Bedside Yes   Airway Clearance   Suction ET Tube   Suction Device Inline suction catheter   Sputum Method Obtained Endotracheal   Sputum Amount Small   Sputum Color/Odor Tan   Sputum Consistency Thick   ETT    Placement Date/Time: 02/14/25 1135   Present on Admission/Arrival: No  Placed By: RT  Placement Verified By: Auscultation;Colorimetric ETCO2 device;Chest X-ray  Preoxygenation: Yes  Airway Tube Size: 7.5 mm  Secured At: 24 cm  Measured From: Lips   Secured At 24 cm   Measured From Lips   ETT Placement (S)  Left  (moved to left)   Secured By Commercial tube may   Site Assessment Dry

## 2025-02-22 NOTE — PROGRESS NOTES
02/21/25 2339   Patient Observation   Pulse 73   Respirations 22   SpO2 98 %   Breath Sounds   Right Upper Lobe Diminished   Right Middle Lobe Diminished   Right Lower Lobe Diminished   Left Upper Lobe Diminished   Left Lower Lobe Diminished   Vent Information   Vent Mode AC/VC   Ventilator Settings   FiO2  35 %   Vt (Set, mL) 500 mL   Resp Rate (Set) 22 bpm   PEEP/CPAP (cmH2O) 5   Vent Patient Data (Readings)   Vt (Measured) 568 mL   Peak Inspiratory Pressure (cmH2O) 43 cmH2O   Rate Measured 22 br/min   Minute Volume (L/min) 12.5 Liters   Mean Airway Pressure (cmH2O) 14 cmH20   Plateau Pressure (cm H2O) 24 cm H2O   Driving Pressure 19   I:E Ratio 1:3.00   Flow Sensitivity 3 L/min   Vent Alarm Settings   High Pressure (cmH2O) 55 cmH2O   Low Minute Volume (lpm) 4 L/min   Low Exhaled Vt (ml) 250 mL   RR High (bpm) 40 br/min   Apnea (secs) 20 secs   Additional Respiratoray Assessments   Humidification Source Heated wire   Humidification Temp 37   Circuit Condensation Drained   Ambu Bag With Mask At Bedside Yes   Airway Clearance   Suction ET Tube   Suction Device Inline suction catheter   Sputum Method Obtained Endotracheal   Sputum Amount Small   Sputum Color/Odor Tan   Sputum Consistency Thick   ETT    Placement Date/Time: 02/14/25 1135   Present on Admission/Arrival: No  Placed By: RT  Placement Verified By: Auscultation;Colorimetric ETCO2 device;Chest X-ray  Preoxygenation: Yes  Airway Tube Size: 7.5 mm  Secured At: 24 cm  Measured From: Lips   Secured At 24 cm   Measured From Lips   ETT Placement (S)  Right  (moved to right)   Secured By Commercial tube may   Site Assessment Dry

## 2025-02-23 ENCOUNTER — APPOINTMENT (OUTPATIENT)
Dept: GENERAL RADIOLOGY | Age: 70
DRG: 207 | End: 2025-02-23
Payer: MEDICARE

## 2025-02-23 LAB
ANION GAP SERPL CALCULATED.3IONS-SCNC: 5 MMOL/L (ref 3–16)
BACTERIA SPEC RESP CULT: NORMAL
BASE EXCESS BLDA CALC-SCNC: 7.6 MMOL/L (ref -3–3)
BUN SERPL-MCNC: 14 MG/DL (ref 7–20)
CALCIUM SERPL-MCNC: 7.5 MG/DL (ref 8.3–10.6)
CHLORIDE SERPL-SCNC: 97 MMOL/L (ref 99–110)
CO2 BLDA-SCNC: 34.1 MMOL/L
CO2 SERPL-SCNC: 35 MMOL/L (ref 21–32)
COHGB MFR BLDA: 0.1 % (ref 0–1.5)
CREAT SERPL-MCNC: 0.6 MG/DL (ref 0.8–1.3)
DEPRECATED RDW RBC AUTO: 16.6 % (ref 12.4–15.4)
GFR SERPLBLD CREATININE-BSD FMLA CKD-EPI: >90 ML/MIN/{1.73_M2}
GLUCOSE BLD-MCNC: 168 MG/DL (ref 70–99)
GLUCOSE BLD-MCNC: 189 MG/DL (ref 70–99)
GLUCOSE BLD-MCNC: 226 MG/DL (ref 70–99)
GLUCOSE BLD-MCNC: 228 MG/DL (ref 70–99)
GLUCOSE BLD-MCNC: 229 MG/DL (ref 70–99)
GLUCOSE SERPL-MCNC: 220 MG/DL (ref 70–99)
GRAM STN SPEC: NORMAL
HCO3 BLDA-SCNC: 32.6 MMOL/L (ref 21–29)
HCT VFR BLD AUTO: 22.7 % (ref 40.5–52.5)
HGB BLD-MCNC: 7.5 G/DL (ref 13.5–17.5)
HGB BLDA-MCNC: 9.5 G/DL (ref 13.5–17.5)
MAGNESIUM SERPL-MCNC: 1.61 MG/DL (ref 1.8–2.4)
MCH RBC QN AUTO: 30.1 PG (ref 26–34)
MCHC RBC AUTO-ENTMCNC: 33.1 G/DL (ref 31–36)
MCV RBC AUTO: 91 FL (ref 80–100)
METHGB MFR BLDA: 0.3 %
O2 THERAPY: ABNORMAL
PCO2 BLDA: 48.7 MMHG (ref 35–45)
PERFORMED ON: ABNORMAL
PH BLDA: 7.44 [PH] (ref 7.35–7.45)
PHOSPHATE SERPL-MCNC: 3.3 MG/DL (ref 2.5–4.9)
PLATELET # BLD AUTO: 277 K/UL (ref 135–450)
PMV BLD AUTO: 7.9 FL (ref 5–10.5)
PO2 BLDA: 74.9 MMHG (ref 75–108)
POTASSIUM SERPL-SCNC: 3.5 MMOL/L (ref 3.5–5.1)
RBC # BLD AUTO: 2.5 M/UL (ref 4.2–5.9)
SAO2 % BLDA: 95.4 %
SODIUM SERPL-SCNC: 137 MMOL/L (ref 136–145)
WBC # BLD AUTO: 4.3 K/UL (ref 4–11)

## 2025-02-23 PROCEDURE — 80048 BASIC METABOLIC PNL TOTAL CA: CPT

## 2025-02-23 PROCEDURE — 6360000002 HC RX W HCPCS: Performed by: INTERNAL MEDICINE

## 2025-02-23 PROCEDURE — 2580000003 HC RX 258: Performed by: INTERNAL MEDICINE

## 2025-02-23 PROCEDURE — 94640 AIRWAY INHALATION TREATMENT: CPT

## 2025-02-23 PROCEDURE — 94761 N-INVAS EAR/PLS OXIMETRY MLT: CPT

## 2025-02-23 PROCEDURE — 2500000003 HC RX 250 WO HCPCS

## 2025-02-23 PROCEDURE — 99232 SBSQ HOSP IP/OBS MODERATE 35: CPT | Performed by: INTERNAL MEDICINE

## 2025-02-23 PROCEDURE — 6370000000 HC RX 637 (ALT 250 FOR IP): Performed by: INTERNAL MEDICINE

## 2025-02-23 PROCEDURE — 85027 COMPLETE CBC AUTOMATED: CPT

## 2025-02-23 PROCEDURE — 84100 ASSAY OF PHOSPHORUS: CPT

## 2025-02-23 PROCEDURE — 2000000000 HC ICU R&B

## 2025-02-23 PROCEDURE — 6360000002 HC RX W HCPCS: Performed by: STUDENT IN AN ORGANIZED HEALTH CARE EDUCATION/TRAINING PROGRAM

## 2025-02-23 PROCEDURE — 99291 CRITICAL CARE FIRST HOUR: CPT | Performed by: INTERNAL MEDICINE

## 2025-02-23 PROCEDURE — 71045 X-RAY EXAM CHEST 1 VIEW: CPT

## 2025-02-23 PROCEDURE — 83735 ASSAY OF MAGNESIUM: CPT

## 2025-02-23 PROCEDURE — 6360000002 HC RX W HCPCS

## 2025-02-23 PROCEDURE — 94003 VENT MGMT INPAT SUBQ DAY: CPT

## 2025-02-23 PROCEDURE — 2700000000 HC OXYGEN THERAPY PER DAY

## 2025-02-23 PROCEDURE — 2500000003 HC RX 250 WO HCPCS: Performed by: INTERNAL MEDICINE

## 2025-02-23 PROCEDURE — 82803 BLOOD GASES ANY COMBINATION: CPT

## 2025-02-23 PROCEDURE — 99232 SBSQ HOSP IP/OBS MODERATE 35: CPT

## 2025-02-23 PROCEDURE — 6370000000 HC RX 637 (ALT 250 FOR IP)

## 2025-02-23 PROCEDURE — 2500000003 HC RX 250 WO HCPCS: Performed by: STUDENT IN AN ORGANIZED HEALTH CARE EDUCATION/TRAINING PROGRAM

## 2025-02-23 RX ORDER — FUROSEMIDE 10 MG/ML
20 INJECTION INTRAMUSCULAR; INTRAVENOUS ONCE
Status: COMPLETED | OUTPATIENT
Start: 2025-02-23 | End: 2025-02-23

## 2025-02-23 RX ORDER — METOCLOPRAMIDE HYDROCHLORIDE 5 MG/ML
10 INJECTION INTRAMUSCULAR; INTRAVENOUS EVERY 6 HOURS
Status: DISCONTINUED | OUTPATIENT
Start: 2025-02-23 | End: 2025-03-09

## 2025-02-23 RX ADMIN — POTASSIUM CHLORIDE 20 MEQ: 400 INJECTION, SOLUTION INTRAVENOUS at 06:34

## 2025-02-23 RX ADMIN — GABAPENTIN 200 MG: 250 SOLUTION ORAL at 13:56

## 2025-02-23 RX ADMIN — INSULIN GLARGINE 20 UNITS: 100 INJECTION, SOLUTION SUBCUTANEOUS at 20:58

## 2025-02-23 RX ADMIN — ENOXAPARIN SODIUM 40 MG: 100 INJECTION SUBCUTANEOUS at 10:40

## 2025-02-23 RX ADMIN — GABAPENTIN 200 MG: 250 SOLUTION ORAL at 10:36

## 2025-02-23 RX ADMIN — PANTOPRAZOLE SODIUM 40 MG: 40 INJECTION, POWDER, LYOPHILIZED, FOR SOLUTION INTRAVENOUS at 10:43

## 2025-02-23 RX ADMIN — DEXMEDETOMIDINE HYDROCHLORIDE 1.1 MCG/KG/HR: 4 INJECTION, SOLUTION INTRAVENOUS at 20:57

## 2025-02-23 RX ADMIN — ASCORBIC ACID, VITAMIN A PALMITATE, CHOLECALCIFEROL, THIAMINE HYDROCHLORIDE, RIBOFLAVIN-5 PHOSPHATE SODIUM, PYRIDOXINE HYDROCHLORIDE, NIACINAMIDE, DEXPANTHENOL, ALPHA-TOCOPHEROL ACETATE, VITAMIN K1, FOLIC ACID, BIOTIN, CYANOCOBALAMIN: 200; 3300; 200; 6; 3.6; 6; 40; 15; 10; 150; 600; 60; 5 INJECTION, SOLUTION INTRAVENOUS at 18:22

## 2025-02-23 RX ADMIN — ALBUTEROL SULFATE 2.5 MG: 2.5 SOLUTION RESPIRATORY (INHALATION) at 19:47

## 2025-02-23 RX ADMIN — LEVETIRACETAM 750 MG: 100 INJECTION INTRAVENOUS at 20:57

## 2025-02-23 RX ADMIN — DEXMEDETOMIDINE HYDROCHLORIDE 1.1 MCG/KG/HR: 4 INJECTION, SOLUTION INTRAVENOUS at 04:25

## 2025-02-23 RX ADMIN — POTASSIUM CHLORIDE 20 MEQ: 400 INJECTION, SOLUTION INTRAVENOUS at 10:36

## 2025-02-23 RX ADMIN — GABAPENTIN 200 MG: 250 SOLUTION ORAL at 17:13

## 2025-02-23 RX ADMIN — ALBUTEROL SULFATE 2.5 MG: 2.5 SOLUTION RESPIRATORY (INHALATION) at 08:17

## 2025-02-23 RX ADMIN — THIAMINE HYDROCHLORIDE 200 MG: 100 INJECTION, SOLUTION INTRAMUSCULAR; INTRAVENOUS at 12:07

## 2025-02-23 RX ADMIN — METOPROLOL TARTRATE 50 MG: 50 TABLET, FILM COATED ORAL at 10:36

## 2025-02-23 RX ADMIN — INSULIN GLARGINE 20 UNITS: 100 INJECTION, SOLUTION SUBCUTANEOUS at 10:45

## 2025-02-23 RX ADMIN — INSULIN LISPRO 4 UNITS: 100 INJECTION, SOLUTION INTRAVENOUS; SUBCUTANEOUS at 15:32

## 2025-02-23 RX ADMIN — INSULIN LISPRO 4 UNITS: 100 INJECTION, SOLUTION INTRAVENOUS; SUBCUTANEOUS at 12:07

## 2025-02-23 RX ADMIN — INSULIN LISPRO 4 UNITS: 100 INJECTION, SOLUTION INTRAVENOUS; SUBCUTANEOUS at 00:33

## 2025-02-23 RX ADMIN — METOCLOPRAMIDE 10 MG: 5 INJECTION, SOLUTION INTRAMUSCULAR; INTRAVENOUS at 15:34

## 2025-02-23 RX ADMIN — DEXMEDETOMIDINE HYDROCHLORIDE 1.1 MCG/KG/HR: 4 INJECTION, SOLUTION INTRAVENOUS at 12:29

## 2025-02-23 RX ADMIN — VANCOMYCIN 1250 MG: 1.75 INJECTION, SOLUTION INTRAVENOUS at 06:35

## 2025-02-23 RX ADMIN — GABAPENTIN 200 MG: 250 SOLUTION ORAL at 20:57

## 2025-02-23 RX ADMIN — INSULIN LISPRO 4 UNITS: 100 INJECTION, SOLUTION INTRAVENOUS; SUBCUTANEOUS at 20:58

## 2025-02-23 RX ADMIN — PROPOFOL 10 MCG/KG/MIN: 10 INJECTION, EMULSION INTRAVENOUS at 16:28

## 2025-02-23 RX ADMIN — PROPOFOL 10 MCG/KG/MIN: 10 INJECTION, EMULSION INTRAVENOUS at 00:41

## 2025-02-23 RX ADMIN — METOCLOPRAMIDE 10 MG: 5 INJECTION, SOLUTION INTRAMUSCULAR; INTRAVENOUS at 10:44

## 2025-02-23 RX ADMIN — DEXMEDETOMIDINE HYDROCHLORIDE 1.1 MCG/KG/HR: 4 INJECTION, SOLUTION INTRAVENOUS at 08:33

## 2025-02-23 RX ADMIN — VANCOMYCIN 1250 MG: 1.75 INJECTION, SOLUTION INTRAVENOUS at 18:27

## 2025-02-23 RX ADMIN — MAGNESIUM SULFATE HEPTAHYDRATE 2000 MG: 40 INJECTION, SOLUTION INTRAVENOUS at 06:36

## 2025-02-23 RX ADMIN — LEVETIRACETAM 750 MG: 100 INJECTION INTRAVENOUS at 10:40

## 2025-02-23 RX ADMIN — INSULIN LISPRO 4 UNITS: 100 INJECTION, SOLUTION INTRAVENOUS; SUBCUTANEOUS at 04:22

## 2025-02-23 RX ADMIN — SODIUM CHLORIDE, PRESERVATIVE FREE 10 ML: 5 INJECTION INTRAVENOUS at 10:48

## 2025-02-23 RX ADMIN — ALBUTEROL SULFATE 2.5 MG: 2.5 SOLUTION RESPIRATORY (INHALATION) at 11:15

## 2025-02-23 RX ADMIN — ALBUTEROL SULFATE 2.5 MG: 2.5 SOLUTION RESPIRATORY (INHALATION) at 14:50

## 2025-02-23 RX ADMIN — DEXMEDETOMIDINE HYDROCHLORIDE 1.1 MCG/KG/HR: 4 INJECTION, SOLUTION INTRAVENOUS at 00:42

## 2025-02-23 RX ADMIN — SODIUM CHLORIDE, PRESERVATIVE FREE 10 ML: 5 INJECTION INTRAVENOUS at 20:58

## 2025-02-23 RX ADMIN — METOPROLOL TARTRATE 50 MG: 50 TABLET, FILM COATED ORAL at 20:57

## 2025-02-23 RX ADMIN — METOCLOPRAMIDE 10 MG: 5 INJECTION, SOLUTION INTRAMUSCULAR; INTRAVENOUS at 20:58

## 2025-02-23 RX ADMIN — FUROSEMIDE 20 MG: 10 INJECTION, SOLUTION INTRAMUSCULAR; INTRAVENOUS at 12:08

## 2025-02-23 RX ADMIN — DEXMEDETOMIDINE HYDROCHLORIDE 1.1 MCG/KG/HR: 4 INJECTION, SOLUTION INTRAVENOUS at 16:26

## 2025-02-23 ASSESSMENT — PULMONARY FUNCTION TESTS
PIF_VALUE: 35
PIF_VALUE: 28
PIF_VALUE: 33
PIF_VALUE: 40
PIF_VALUE: 35
PIF_VALUE: 15
PIF_VALUE: 38
PIF_VALUE: 40
PIF_VALUE: 42
PIF_VALUE: 38
PIF_VALUE: 40
PIF_VALUE: 38
PIF_VALUE: 40
PIF_VALUE: 37
PIF_VALUE: 33
PIF_VALUE: 42
PIF_VALUE: 39
PIF_VALUE: 37

## 2025-02-23 NOTE — PLAN OF CARE
Problem: Chronic Conditions and Co-morbidities  Goal: Patient's chronic conditions and co-morbidity symptoms are monitored and maintained or improved  Outcome: Progressing     Problem: Discharge Planning  Goal: Discharge to home or other facility with appropriate resources  Outcome: Progressing     Problem: Safety - Adult  Goal: Free from fall injury  Outcome: Progressing     Problem: Pain  Goal: Verbalizes/displays adequate comfort level or baseline comfort level  Outcome: Progressing     Problem: Respiratory - Adult  Goal: Achieves optimal ventilation and oxygenation  2/22/2025 2218 by Kylie Ramos RN  Outcome: Progressing  2/22/2025 1706 by Vitaly Tong RN  Outcome: Progressing     Problem: Cardiovascular - Adult  Goal: Maintains optimal cardiac output and hemodynamic stability  2/22/2025 2218 by Kylie Ramos RN  Outcome: Progressing  2/22/2025 1706 by Vitaly Tong RN  Outcome: Progressing  Goal: Absence of cardiac dysrhythmias or at baseline  Outcome: Progressing     Problem: Skin/Tissue Integrity - Adult  Goal: Skin integrity remains intact  Description: 1.  Monitor for areas of redness and/or skin breakdown  2.  Assess vascular access sites hourly  3.  Every 4-6 hours minimum:  Change oxygen saturation probe site  4.  Every 4-6 hours:  If on nasal continuous positive airway pressure, respiratory therapy assess nares and determine need for appliance change or resting period  Outcome: Progressing  Goal: Oral mucous membranes remain intact  Outcome: Progressing     Problem: Musculoskeletal - Adult  Goal: Return mobility to safest level of function  Outcome: Progressing  Goal: Maintain proper alignment of affected body part  Outcome: Progressing  Goal: Return ADL status to a safe level of function  Outcome: Progressing     Problem: Gastrointestinal - Adult  Goal: Maintains or returns to baseline bowel function  Outcome: Progressing  Goal: Maintains adequate nutritional intake  Outcome:

## 2025-02-23 NOTE — PROGRESS NOTES
Patient is intubated with an ETT size 7.5,secured at lipline 25cm,on vent settings ACVC,FIO2-35%,TV-500,RR-22,PEEP-5,has diminished breath sounds bilaterally.  He is sedated on propofol 10mcg/kg/min and precedex 1.5mcg/kg/hr.  He has bilateral soft wrist restraints for safety.  He has an NG secured with a briddle ,feeding on TF,Glucerna 1.5 at 15mls/hr and water flush 30mls Q 3hrs.  He has generalised oedema.  He has a chavira,urine output closely monitored.  He is also on TPN at 83mls/hr.  Bed is at its lowest level,restraints on,will continue to monitor patient.

## 2025-02-23 NOTE — PROGRESS NOTES
02/22/25 2000   RT Protocol   History Pulmonary Disease 2   Respiratory pattern 2   Breath sounds 2   Cough 1   Indications for Bronchodilator Therapy Decreased or absent breath sounds   Bronchodilator Assessment Score 7     RT Inhaler-Nebulizer Bronchodilator Protocol Note    There is a bronchodilator order in the chart from a provider indicating to follow the RT Bronchodilator Protocol and there is an “Initiate RT Inhaler-Nebulizer Bronchodilator Protocol” order as well (see protocol at bottom of note).    CXR Findings:  XR CHEST PORTABLE    Result Date: 2/22/2025  Increasing basilar opacities may reflect developing infiltrate/effusions. Study is somewhat limited secondary to rotation but no other significant change identified.     XR CHEST PORTABLE    Result Date: 2/21/2025  Interstitial and hazy opacities in the right lower lung more than left are still present. Endotracheal tube, nasogastric tube, and left jugular line.       The findings from the last RT Protocol Assessment were as follows:   History Pulmonary Disease: Chronic pulmonary disease  Respiratory Pattern: Dyspnea on exertion or RR 21-25 bpm  Breath Sounds: Slightly diminished and/or crackles  Cough: Strong, productive  Indication for Bronchodilator Therapy: Decreased or absent breath sounds  Bronchodilator Assessment Score: 7    Aerosolized bronchodilator medication orders have been revised according to the RT Inhaler-Nebulizer Bronchodilator Protocol below.    Respiratory Therapist to perform RT Therapy Protocol Assessment initially then follow the protocol.  Repeat RT Therapy Protocol Assessment PRN for score 0-3 or on second treatment, BID, and PRN for scores above 3.    No Indications - adjust the frequency to every 6 hours PRN wheezing or bronchospasm, if no treatments needed after 48 hours then discontinue using Per Protocol order mode.     If indication present, adjust the RT bronchodilator orders based on the Bronchodilator Assessment

## 2025-02-23 NOTE — PROGRESS NOTES
02/22/25 2324   Patient Observation   Pulse 67   Respirations 22   SpO2 99 %   Patient Observations 7.5 ett 25 at lip   Breath Sounds   Right Upper Lobe Diminished   Right Middle Lobe Diminished   Right Lower Lobe Diminished   Left Upper Lobe Diminished   Left Lower Lobe Diminished   Vent Information   Vent Mode AC/VC   Ventilator Settings   FiO2  35 %   Vt (Set, mL) 500 mL   Resp Rate (Set) 22 bpm   PEEP/CPAP (cmH2O) 5   Vent Patient Data (Readings)   Vt (Measured) 505 mL   Peak Inspiratory Pressure (cmH2O) 38 cmH2O   Rate Measured 22 br/min   Minute Volume (L/min) 11.1 Liters   Mean Airway Pressure (cmH2O) 12 cmH20   Plateau Pressure (cm H2O) 24 cm H2O   Driving Pressure 19   I:E Ratio 1:3.00   Flow Sensitivity 3 L/min   Vent Alarm Settings   High Pressure (cmH2O) 55 cmH2O   Low Minute Volume (lpm) 4 L/min   Low Exhaled Vt (ml) 250 mL   RR High (bpm) 40 br/min   Apnea (secs) 20 secs   Additional Respiratoray Assessments   Humidification Source Heated wire   Humidification Temp 36.9   Circuit Condensation Drained   Ambu Bag With Mask At Bedside Yes   Airway Clearance   Suction ET Tube   Suction Device Inline suction catheter   Sputum Method Obtained Endotracheal   Sputum Amount Small   Sputum Color/Odor White   Sputum Consistency Thick   ETT    Placement Date/Time: 02/14/25 1135   Present on Admission/Arrival: No  Placed By: RT  Placement Verified By: Auscultation;Colorimetric ETCO2 device;Chest X-ray  Preoxygenation: Yes  Airway Tube Size: 7.5 mm  Secured At: 24 cm  Measured From: Lips   Secured At 25 cm   Measured From Lips   ETT Placement Left   Secured By Commercial tube may

## 2025-02-23 NOTE — PROGRESS NOTES
Reassessment done,patient is on vent settings,ACVC,FIO2-35%,TV-500,RR-22,PEEP-5,is sedated on propofol and precedex,on TPN and TF,residuals closely monitored,has generalised oedema,has a chavira,vitals stable.

## 2025-02-23 NOTE — PROGRESS NOTES
02/23/25 0324   Patient Observation   Pulse 64   Respirations 22   SpO2 98 %   Breath Sounds   Right Upper Lobe Diminished   Right Middle Lobe Diminished   Right Lower Lobe Diminished   Left Upper Lobe Diminished   Left Lower Lobe Diminished   Vent Information   Vent Mode AC/VC   Ventilator Settings   FiO2  35 %   Vt (Set, mL) 500 mL   Resp Rate (Set) 22 bpm   PEEP/CPAP (cmH2O) 5   Vent Patient Data (Readings)   Vt (Measured) 567 mL   Peak Inspiratory Pressure (cmH2O) 40 cmH2O   Rate Measured 22 br/min   Minute Volume (L/min) 12.5 Liters   Mean Airway Pressure (cmH2O) 13 cmH20   Plateau Pressure (cm H2O) 24 cm H2O   Driving Pressure 19   I:E Ratio 1:3.00   Flow Sensitivity 3 L/min   Vent Alarm Settings   High Pressure (cmH2O) 55 cmH2O   Low Minute Volume (lpm) 4 L/min   Low Exhaled Vt (ml) 250 mL   RR High (bpm) 40 br/min   Apnea (secs) 20 secs   Additional Respiratoray Assessments   Humidification Source Heated wire   Humidification Temp 36.9   Circuit Condensation Drained   Ambu Bag With Mask At Bedside Yes   Airway Clearance   Suction Device Inline suction catheter   Sputum Method Obtained Endotracheal   Sputum Amount Scant   ETT    Placement Date/Time: 02/14/25 1135   Present on Admission/Arrival: No  Placed By: RT  Placement Verified By: Auscultation;Colorimetric ETCO2 device;Chest X-ray  Preoxygenation: Yes  Airway Tube Size: 7.5 mm  Secured At: 24 cm  Measured From: Lips   Secured At 24 cm   Measured From Lips   ETT Placement (S)  Center  (moved to center)   Secured By Commercial tube may   Site Assessment Dry

## 2025-02-23 NOTE — FLOWSHEET NOTE
02/23/25 0700   Vitals   Temp 99.8 °F (37.7 °C)   Temp Source Bladder   Pulse 76   Heart Rate Source Monitor   Respirations 19   /60   MAP (Calculated) 83   MAP (mmHg) 79   BP Method Automatic   Oxygen Therapy   SpO2 96 %   FiO2  35 %     Vital signs stable. Shift assessment, completed, see flow sheet. RASS 0. SBT in process. Pt arouses to pain but is unable to follow commands. Intubated and sedated with # 7 ETT, at 25 LL. 22 / 500 / 35 %  / 5. NSR, Respirations are easy, even, and unlabored. Bilateral lung sounds with scattered rhonchi and diminished bases. NG remains secure in place to L nare. TF on hold for SBT.  Bowel sounds hypoactive. L IJ triple lumen CVC WNL, Precedex continues at 1.1 mcg/kg/hr, Propofol off for SBT. TPN continues at previous rate. Castrejon in place and draining clear yellow urine. Call light within reach. Bed in lowest position. Bed alarm on. Will continue to monitor

## 2025-02-23 NOTE — PROGRESS NOTES
TPN: Day 10  Labs reviewed  Mag 1.61- Mag 2 grams IVPB X one given, K = 3.5 (KCL 20 mEq IVPB x 2  given)  Continue same TPN at 83ml/hr  Recent Labs     02/21/25  0554 02/21/25  1148 02/22/25  0514 02/22/25  0515 02/23/25  0448     --  136  --  137   K 3.5  --  3.7  --  3.5   CL 99  --  97*  --  97*   CO2 34*  --  36*  --  35*   BUN 11  --  14  --  14   GLUCOSE 251*  --  199*  --  220*   CALCIUM 7.5*  --  7.5*  --  7.5*   MG 1.58*  --  1.52*  --  1.61*   PHOS 2.7  --   --   --  3.3   TRIG 164*  --   --   --   --    WBC 4.0  --  4.2  --  4.3   PHART 7.322*   < >  --    < > 7.444    < > = values in this interval not displayed.

## 2025-02-23 NOTE — PROGRESS NOTES
[facility]                            Critical care Progress Note :      CC: COPD respiratory failure  Flu     Events of Last 24 hours:   On SBT 10/5 m,failed   Olguin to pain   Low grade fever   Propofol 15   Precedex 1   22/500/35/5  ABG stable   Swelling hands  FiO2 40%  Minimal secretions   BM last night   TPN per surgery,started trophic feeding but cannot increase trophic   Surgery following   Had CT scan   High peak pressure this am,normal plt   CXR with atelectisis /effusion monitor   + lasix   Vascular lines: IV: Left IJ     MV:   Vent Mode: AC/VC Resp Rate (Set): 22 bpm/Vt (Set, mL): 500 mL/ /FiO2 : 50 %  Recent Labs     25  0515 25  0448   PHART 7.448 7.444   CJB5SCL 55.0* 48.7*   PO2ART 75.2 74.9*       IV:   PN-Adult Premix  - Standard Electrolytes - Central Line 83 mL/hr at 25 0421    sodium chloride      dexmedeTOMIDine HCl in NaCl 1.1 mcg/kg/hr (25 0833)    propofol Stopped (25 0804)    sodium chloride      dextrose         Vitals:  Blood pressure (!) 168/63, pulse (!) 110, temperature 99.8 °F (37.7 °C), temperature source Bladder, resp. rate 30, height 1.702 m (5' 7.01\"), weight 93.3 kg (205 lb 11 oz), SpO2 96%.  on   Temp  Av.5 °F (36.9 °C)  Min: 97.3 °F (36.3 °C)  Max: 100 °F (37.8 °C)    Intake/Output Summary (Last 24 hours) at 2025 0920  Last data filed at 2025 0635  Gross per 24 hour   Intake 3648.28 ml   Output 4400 ml   Net -751.72 ml     EXAM:  General: ill appearing.  Intubated sedated.  Eyes: No sclera icterus. No conjunctival injection.  ENT: No discharge. ET tube in place  Neck: Trachea midline.   Resp: No accessory muscle use. + crackles. No wheezing. + rhonchi.   CV: tachy rate. Regular rhythm. No mumur or rub. No edema.   GI: Non-tender. Non-distended.   Skin: Warm and dry. No rash on exposed extremities.  M/S: No cyanosis. No clubbing.   Neuro: Intubated sedated.  Not

## 2025-02-23 NOTE — PROGRESS NOTES
02/22/25 2001   Patient Observation   Pulse 65   Respirations 22   SpO2 98 %   Patient Observations 7.5 ett 25 at lip   Breath Sounds   Right Upper Lobe Diminished   Right Middle Lobe Diminished   Right Lower Lobe Diminished   Left Upper Lobe Diminished   Left Lower Lobe Diminished   Vent Information   Vent Mode AC/VC   Ventilator Settings   FiO2  35 %   Vt (Set, mL) 500 mL   Resp Rate (Set) 22 bpm   PEEP/CPAP (cmH2O) 5   Vent Patient Data (Readings)   Vt (Measured) 566 mL   Peak Inspiratory Pressure (cmH2O) 39 cmH2O   Rate Measured 22 br/min   Minute Volume (L/min) 12.4 Liters   Mean Airway Pressure (cmH2O) 13 cmH20   Plateau Pressure (cm H2O) 25 cm H2O   Driving Pressure 20   I:E Ratio 1:3.00   Flow Sensitivity 3 L/min   Static Compliance (L/cm H2O) 29   Dynamic Compliance (L/cm H2O) 16 L/cm H2O   Vent Alarm Settings   High Pressure (cmH2O) 55 cmH2O   Low Minute Volume (lpm) 4 L/min   Low Exhaled Vt (ml) 250 mL   RR High (bpm) 40 br/min   Apnea (secs) 20 secs   Additional Respiratoray Assessments   Humidification Source Heated wire   Humidification Temp 35.7   Circuit Condensation Drained   Ambu Bag With Mask At Bedside Yes   Airway Clearance   Suction ET Tube   Suction Device Inline suction catheter   Sputum Method Obtained Endotracheal   Sputum Amount Moderate   Sputum Color/Odor White   Sputum Consistency Thick   ETT    Placement Date/Time: 02/14/25 1135   Present on Admission/Arrival: No  Placed By: RT  Placement Verified By: Auscultation;Colorimetric ETCO2 device;Chest X-ray  Preoxygenation: Yes  Airway Tube Size: 7.5 mm  Secured At: 24 cm  Measured From: Lips   Secured At 25 cm   Measured From Lips   ETT Placement Right   Secured By Commercial tube may   Treatment   $Treatment Type $Inhaled Therapy/Meds

## 2025-02-23 NOTE — PROGRESS NOTES
Patient is still having high amount of residuals,200mls  noted,hence TF remains at 15mls/hr,this RN did not advance to goal rate as per orders.

## 2025-02-23 NOTE — PROGRESS NOTES
4 Eyes Skin Assessment     NAME:  Sandor Early  YOB: 1955  MEDICAL RECORD NUMBER:  [id number]    The patient is being assessed for  Shift Handoff    I agree that at least one RN has performed a thorough Head to Toe Skin Assessment on the patient. ALL assessment sites listed below have been assessed.      Areas assessed by both nurses:    Head, Face, Ears, Shoulders, Back, Chest, Arms, Elbows, Hands, Sacrum. Buttock, Coccyx, Ischium, and Legs. Feet and Heels        Does the Patient have a Wound? No noted wound(s)       Abraham Prevention initiated by RN: Yes  Wound Care Orders initiated by RN: No    Pressure Injury (Stage 3,4, Unstageable, DTI, NWPT, and Complex wounds) if present, place Wound referral order by RN under : No    New Ostomies, if present place, Ostomy referral order under : No     Nurse 1 eSignature: Electronically signed by Kylie Ramos RN on 2/23/25 at 1:41 AM EST    **SHARE this note so that the co-signing nurse can place an eSignature**    Nurse 2 eSignature: Electronically signed by Lois Baker RN on 2/23/25 at 1:43 AM EST  Patient is not able to demonstrated the ability to move from a reclining position to an upright position within the recliner. Patient is confused, demented and /or unable to follow instruction.

## 2025-02-23 NOTE — PROGRESS NOTES
Progress Note    Admit Date:  1/30/2025    Interval history:  Influenza A and Acute respiratory failure   Was on bipap in iCU and improved, transferred to [facility]        Has NG placed for Ileus  , he pulled out NG and likely has aspirated became hypoxic and transferred to ICU      Seizure like event 2/7,   Remains confused  MRI brain incomplete .     2/13   Mentation worse today  On precedex   On 4 L of O2     2/21  Ct with resolving ileus  Started on TF @15 cc   Started weaning sedation  Repeat CT abd with no acute findings     2/22  S/p bronch yesterday.  Sedated on vent.    Subjective:  Mr. Early   Failed SBT.      Objective:   Patient Vitals for the past 4 hrs:   BP Temp Temp src Pulse Resp SpO2   02/23/25 0900 (!) 168/63 -- -- (!) 110 30 96 %   02/23/25 0800 (!) 133/57 -- -- 75 23 95 %   02/23/25 0700 129/60 99.8 °F (37.7 °C) Bladder 76 19 96 %          Intake/Output Summary (Last 24 hours) at 2/23/2025 1044  Last data filed at 2/23/2025 1020  Gross per 24 hour   Intake 3717.28 ml   Output 4650 ml   Net -932.72 ml       Physical Exam:        General:  elderly male sedated on vent  Oral ETT and OG noted  Left IJ TLC . Appears to be not in any distress  Mucous Membranes:  Pink , anicteric  Neck: No JVD, no carotid bruit, no thyromegaly  Chest:  Clear to auscultation bilaterally, resolved wheeze  Cardiovascular:  RRR S1S2 heard, no murmurs or gallops  Abdomen:  Soft, obese +distended, non tender, no organomegaly, BS absent  Extremities: trace edema to ext improved   Distal pulses well felt  Neurological : sedated        Medications:  metoclopramide, 10 mg, Q6H  furosemide, 20 mg, Once  vancomycin, 1,250 mg, Q12H  insulin lispro, 0-16 Units, Q4H  insulin glargine, 20 Units, BID  levETIRAcetam, 750 mg, BID  [Held by provider] dilTIAZem, 60 mg, 3 times per day  metoprolol tartrate, 50 mg, BID  Gabapentin, 200 mg, 4x Daily  albuterol, 2.5 mg, 4x Daily RT  fat emulsion, 250 mL, Once per day on Monday Thursday  sodium

## 2025-02-23 NOTE — PROGRESS NOTES
General Surgery  Daily Progress Note    Pt Name: Sandor Early  Medical Record Number: [id number]  Date of Birth 1955   Today's Date: 2/23/2025  Admit date: 1/30/2025  LOS: Day 24    SUBJECTIVE  Intubated and sedated.      OBJECTIVE  Vitals:    02/23/25 0400 02/23/25 0401 02/23/25 0500 02/23/25 0600   BP: (!) 134/54  (!) 136/57 (!) 127/54   Pulse: 66 65 70 73   Resp: 22 22 24 23   Temp: 98.2 °F (36.8 °C)      TempSrc: Bladder      SpO2: 97% 97% 96% 96%   Weight: 93.3 kg (205 lb 11 oz)      Height: 1.702 m (5' 7.01\")          Gen: Sedated  Resp: Intubated.  CV: Regular rate. Regular rhythm.   GI: No pain response with palpation. +Softly distended, similar to prior exams x 2, ?protuberance rather than true distension. +Active but sluggish BS.   Skin: Warm and dry. No nodule or rash on exposed extremities.       I/O last 3 completed shifts:  In: 5603.4 [I.V.:1282.3; NG/GT:671; IV Piggyback:842.4]  Out: 5600 [Urine:5600]  No intake/output data recorded.    LABS  CBC:   Recent Labs     02/21/25  0554 02/22/25  0514 02/23/25  0448   WBC 4.0 4.2 4.3   HGB 7.9* 7.6* 7.5*   HCT 23.8* 23.2* 22.7*   MCV 92.9 92.3 91.0    255 277     BMP:   Recent Labs     02/21/25  0554 02/22/25  0514 02/23/25  0448    136 137   K 3.5 3.7 3.5   CL 99 97* 97*   CO2 34* 36* 35*   PHOS 2.7  --  3.3   BUN 11 14 14   CREATININE 0.6* 0.6* 0.6*     LIVER PROFILE:   Recent Labs     02/21/25  0554   AST 24   ALT 15   BILIDIR <0.1   BILITOT <0.2   ALKPHOS 224*       PT/INR:   No results for input(s): \"PROTIME\", \"INR\" in the last 72 hours.    APTT: No results for input(s): \"APTT\" in the last 72 hours.  UA:  No results for input(s): \"NITRITE\", \"COLORU\", \"PHUR\", \"LABCAST\", \"WBCUA\", \"RBCUA\", \"MUCUS\", \"TRICHOMONAS\", \"YEAST\", \"BACTERIA\", \"CLARITYU\", \"SPECGRAV\", \"LEUKOCYTESUR\", \"UROBILINOGEN\", \"BILIRUBINUR\", \"BLOODU\", \"GLUCOSEU\", \"AMORPHOUS\" in the last 72 hours.    Invalid input(s): \"KETONESU\"        IMAGING  XR CHEST PORTABLE   Final  infection.         XR CHEST PORTABLE   Final Result   1. No significant change.         XR CHEST PORTABLE   Final Result   Endotracheal tube, nasogastric tube, and left jugular line are stable.  NG   tube could be advanced by 5-10 cm for better positioning.      Bronchial thickening and peribronchial opacities with suspicion for   bronchiolitis..         XR CHEST PORTABLE   Final Result   Support tubes and lines as described above.      Atelectasis or infiltrate in the left lung base. Right perihilar atelectasis   or infiltrate.         XR ABDOMEN (2 VIEWS)   Final Result   No significant interval change.         CT ABDOMEN PELVIS W IV CONTRAST Additional Contrast? Oral   Final Result   1. Progressive inflammatory change of the distal and terminal ileum with   suspected inflammatory stricture and developing partial small bowel   obstruction.  Given rapid development and history, an infectious pattern of   enteritis is suspected.   2. No evidence of pneumatosis or perforation on the current exam.   3. Slight increase in size of a small fluid collection adjacent to the   terminal ileum. A small developing abscess is likely.   4. Severe bladder mucosal thickening. Correlate with urinalysis.   5. Worsening dependent airspace opacities in the lower lobes. Atelectasis or   edema may be considered. Developing pneumonitis cannot be excluded.         XR CHEST PORTABLE   Final Result   Interstitial thickening is likely due to mild pulmonary edema.         XR ABDOMEN (2 VIEWS)   Final Result   Persistent but improving ileus pattern.         XR CHEST PORTABLE   Final Result   1. Persistent mild blunting of left costophrenic angle which could be related   to pleural thickening or trace pleural effusion.  There are no acute findings   elsewhere in the chest.   2. Gastric tube in place which extends a short distance in the stomach with   the side port 2-3 cm above the domingo.  Advancement is recommended.         XR CHEST PORTABLE

## 2025-02-24 ENCOUNTER — APPOINTMENT (OUTPATIENT)
Dept: GENERAL RADIOLOGY | Age: 70
DRG: 207 | End: 2025-02-24
Payer: MEDICARE

## 2025-02-24 LAB
ALBUMIN SERPL-MCNC: 1.7 G/DL (ref 3.4–5)
ALP SERPL-CCNC: 281 U/L (ref 40–129)
ALT SERPL-CCNC: 34 U/L (ref 10–40)
ANION GAP SERPL CALCULATED.3IONS-SCNC: 0 MMOL/L (ref 3–16)
AST SERPL-CCNC: 45 U/L (ref 15–37)
BASE EXCESS BLDA CALC-SCNC: 10.3 MMOL/L (ref -3–3)
BILIRUB DIRECT SERPL-MCNC: <0.1 MG/DL (ref 0–0.3)
BILIRUB INDIRECT SERPL-MCNC: ABNORMAL MG/DL (ref 0–1)
BILIRUB SERPL-MCNC: <0.2 MG/DL (ref 0–1)
BUN SERPL-MCNC: 16 MG/DL (ref 7–20)
CALCIUM SERPL-MCNC: 8 MG/DL (ref 8.3–10.6)
CHLORIDE SERPL-SCNC: 95 MMOL/L (ref 99–110)
CO2 BLDA-SCNC: 38 MMOL/L
CO2 SERPL-SCNC: 38 MMOL/L (ref 21–32)
COHGB MFR BLDA: 0.3 % (ref 0–1.5)
CREAT SERPL-MCNC: 0.6 MG/DL (ref 0.8–1.3)
DEPRECATED RDW RBC AUTO: 16.7 % (ref 12.4–15.4)
GFR SERPLBLD CREATININE-BSD FMLA CKD-EPI: >90 ML/MIN/{1.73_M2}
GLUCOSE BLD-MCNC: 156 MG/DL (ref 70–99)
GLUCOSE BLD-MCNC: 161 MG/DL (ref 70–99)
GLUCOSE BLD-MCNC: 173 MG/DL (ref 70–99)
GLUCOSE BLD-MCNC: 178 MG/DL (ref 70–99)
GLUCOSE BLD-MCNC: 195 MG/DL (ref 70–99)
GLUCOSE BLD-MCNC: 237 MG/DL (ref 70–99)
GLUCOSE SERPL-MCNC: 215 MG/DL (ref 70–99)
HCO3 BLDA-SCNC: 36.2 MMOL/L (ref 21–29)
HCT VFR BLD AUTO: 22.9 % (ref 40.5–52.5)
HGB BLD-MCNC: 7.6 G/DL (ref 13.5–17.5)
HGB BLDA-MCNC: 8.5 G/DL (ref 13.5–17.5)
MAGNESIUM SERPL-MCNC: 1.72 MG/DL (ref 1.8–2.4)
MCH RBC QN AUTO: 30.5 PG (ref 26–34)
MCHC RBC AUTO-ENTMCNC: 33.5 G/DL (ref 31–36)
MCV RBC AUTO: 91.3 FL (ref 80–100)
METHGB MFR BLDA: 0.2 %
O2 THERAPY: ABNORMAL
PATH CONSULT FLUID: NORMAL
PCO2 BLDA: 58 MMHG (ref 35–45)
PERFORMED ON: ABNORMAL
PH BLDA: 7.41 [PH] (ref 7.35–7.45)
PHOSPHATE SERPL-MCNC: 4 MG/DL (ref 2.5–4.9)
PLATELET # BLD AUTO: 314 K/UL (ref 135–450)
PMV BLD AUTO: 7.9 FL (ref 5–10.5)
PO2 BLDA: 74 MMHG (ref 75–108)
POTASSIUM SERPL-SCNC: 3.7 MMOL/L (ref 3.5–5.1)
PROT SERPL-MCNC: 6.1 G/DL (ref 6.4–8.2)
RBC # BLD AUTO: 2.51 M/UL (ref 4.2–5.9)
SAO2 % BLDA: 94.7 %
SODIUM SERPL-SCNC: 133 MMOL/L (ref 136–145)
TRIGL SERPL-MCNC: 166 MG/DL (ref 0–150)
VANCOMYCIN TROUGH SERPL-MCNC: 18.9 UG/ML (ref 10–20)
WBC # BLD AUTO: 5 K/UL (ref 4–11)

## 2025-02-24 PROCEDURE — 6370000000 HC RX 637 (ALT 250 FOR IP): Performed by: INTERNAL MEDICINE

## 2025-02-24 PROCEDURE — 6360000002 HC RX W HCPCS: Performed by: INTERNAL MEDICINE

## 2025-02-24 PROCEDURE — 6370000000 HC RX 637 (ALT 250 FOR IP)

## 2025-02-24 PROCEDURE — 2700000000 HC OXYGEN THERAPY PER DAY

## 2025-02-24 PROCEDURE — 2500000003 HC RX 250 WO HCPCS: Performed by: INTERNAL MEDICINE

## 2025-02-24 PROCEDURE — 2000000000 HC ICU R&B

## 2025-02-24 PROCEDURE — 80202 ASSAY OF VANCOMYCIN: CPT

## 2025-02-24 PROCEDURE — 36600 WITHDRAWAL OF ARTERIAL BLOOD: CPT

## 2025-02-24 PROCEDURE — 2580000003 HC RX 258: Performed by: INTERNAL MEDICINE

## 2025-02-24 PROCEDURE — 6360000002 HC RX W HCPCS

## 2025-02-24 PROCEDURE — 6360000002 HC RX W HCPCS: Performed by: STUDENT IN AN ORGANIZED HEALTH CARE EDUCATION/TRAINING PROGRAM

## 2025-02-24 PROCEDURE — APPSS15 APP SPLIT SHARED TIME 0-15 MINUTES

## 2025-02-24 PROCEDURE — 82803 BLOOD GASES ANY COMBINATION: CPT

## 2025-02-24 PROCEDURE — 71045 X-RAY EXAM CHEST 1 VIEW: CPT

## 2025-02-24 PROCEDURE — 94003 VENT MGMT INPAT SUBQ DAY: CPT

## 2025-02-24 PROCEDURE — 84100 ASSAY OF PHOSPHORUS: CPT

## 2025-02-24 PROCEDURE — 99291 CRITICAL CARE FIRST HOUR: CPT | Performed by: INTERNAL MEDICINE

## 2025-02-24 PROCEDURE — 83735 ASSAY OF MAGNESIUM: CPT

## 2025-02-24 PROCEDURE — 2500000003 HC RX 250 WO HCPCS: Performed by: STUDENT IN AN ORGANIZED HEALTH CARE EDUCATION/TRAINING PROGRAM

## 2025-02-24 PROCEDURE — 80048 BASIC METABOLIC PNL TOTAL CA: CPT

## 2025-02-24 PROCEDURE — 84478 ASSAY OF TRIGLYCERIDES: CPT

## 2025-02-24 PROCEDURE — 94761 N-INVAS EAR/PLS OXIMETRY MLT: CPT

## 2025-02-24 PROCEDURE — 99232 SBSQ HOSP IP/OBS MODERATE 35: CPT | Performed by: INTERNAL MEDICINE

## 2025-02-24 PROCEDURE — 99232 SBSQ HOSP IP/OBS MODERATE 35: CPT | Performed by: SURGERY

## 2025-02-24 PROCEDURE — 80076 HEPATIC FUNCTION PANEL: CPT

## 2025-02-24 PROCEDURE — 85027 COMPLETE CBC AUTOMATED: CPT

## 2025-02-24 PROCEDURE — 74018 RADEX ABDOMEN 1 VIEW: CPT

## 2025-02-24 PROCEDURE — 94640 AIRWAY INHALATION TREATMENT: CPT

## 2025-02-24 RX ORDER — FUROSEMIDE 10 MG/ML
40 INJECTION INTRAMUSCULAR; INTRAVENOUS DAILY
Status: DISCONTINUED | OUTPATIENT
Start: 2025-02-24 | End: 2025-02-25

## 2025-02-24 RX ORDER — MAGNESIUM SULFATE IN WATER 40 MG/ML
2000 INJECTION, SOLUTION INTRAVENOUS ONCE
Status: COMPLETED | OUTPATIENT
Start: 2025-02-24 | End: 2025-02-24

## 2025-02-24 RX ORDER — BISACODYL 10 MG
20 SUPPOSITORY, RECTAL RECTAL ONCE
Status: COMPLETED | OUTPATIENT
Start: 2025-02-24 | End: 2025-02-24

## 2025-02-24 RX ADMIN — DILTIAZEM HYDROCHLORIDE 60 MG: 60 TABLET ORAL at 14:44

## 2025-02-24 RX ADMIN — PROPOFOL 15 MCG/KG/MIN: 10 INJECTION, EMULSION INTRAVENOUS at 06:14

## 2025-02-24 RX ADMIN — METOCLOPRAMIDE 10 MG: 5 INJECTION, SOLUTION INTRAMUSCULAR; INTRAVENOUS at 20:13

## 2025-02-24 RX ADMIN — SODIUM CHLORIDE, PRESERVATIVE FREE 10 ML: 5 INJECTION INTRAVENOUS at 09:08

## 2025-02-24 RX ADMIN — DEXMEDETOMIDINE HYDROCHLORIDE 1.3 MCG/KG/HR: 4 INJECTION, SOLUTION INTRAVENOUS at 15:02

## 2025-02-24 RX ADMIN — ALBUTEROL SULFATE 2.5 MG: 2.5 SOLUTION RESPIRATORY (INHALATION) at 15:54

## 2025-02-24 RX ADMIN — METOPROLOL TARTRATE 50 MG: 50 TABLET, FILM COATED ORAL at 12:00

## 2025-02-24 RX ADMIN — INSULIN GLARGINE 20 UNITS: 100 INJECTION, SOLUTION SUBCUTANEOUS at 21:35

## 2025-02-24 RX ADMIN — METOCLOPRAMIDE 10 MG: 5 INJECTION, SOLUTION INTRAMUSCULAR; INTRAVENOUS at 02:00

## 2025-02-24 RX ADMIN — ALBUTEROL SULFATE 2.5 MG: 2.5 SOLUTION RESPIRATORY (INHALATION) at 20:10

## 2025-02-24 RX ADMIN — FUROSEMIDE 40 MG: 10 INJECTION, SOLUTION INTRAMUSCULAR; INTRAVENOUS at 11:18

## 2025-02-24 RX ADMIN — BISACODYL 20 MG: 10 SUPPOSITORY RECTAL at 08:31

## 2025-02-24 RX ADMIN — ENOXAPARIN SODIUM 40 MG: 100 INJECTION SUBCUTANEOUS at 08:31

## 2025-02-24 RX ADMIN — GABAPENTIN 200 MG: 250 SOLUTION ORAL at 21:35

## 2025-02-24 RX ADMIN — VANCOMYCIN 1250 MG: 1.75 INJECTION, SOLUTION INTRAVENOUS at 06:50

## 2025-02-24 RX ADMIN — FENTANYL CITRATE 50 MCG: 50 INJECTION, SOLUTION INTRAMUSCULAR; INTRAVENOUS at 11:23

## 2025-02-24 RX ADMIN — INSULIN LISPRO 4 UNITS: 100 INJECTION, SOLUTION INTRAVENOUS; SUBCUTANEOUS at 05:21

## 2025-02-24 RX ADMIN — GABAPENTIN 200 MG: 250 SOLUTION ORAL at 11:20

## 2025-02-24 RX ADMIN — METOCLOPRAMIDE 10 MG: 5 INJECTION, SOLUTION INTRAMUSCULAR; INTRAVENOUS at 14:44

## 2025-02-24 RX ADMIN — ALBUTEROL SULFATE 2.5 MG: 2.5 SOLUTION RESPIRATORY (INHALATION) at 11:56

## 2025-02-24 RX ADMIN — DEXMEDETOMIDINE HYDROCHLORIDE 1.1 MCG/KG/HR: 4 INJECTION, SOLUTION INTRAVENOUS at 09:33

## 2025-02-24 RX ADMIN — POTASSIUM BICARBONATE 30 MEQ: 391 TABLET, EFFERVESCENT ORAL at 11:18

## 2025-02-24 RX ADMIN — MIDAZOLAM HYDROCHLORIDE 2 MG: 1 INJECTION, SOLUTION INTRAMUSCULAR; INTRAVENOUS at 22:37

## 2025-02-24 RX ADMIN — PANTOPRAZOLE SODIUM 40 MG: 40 INJECTION, POWDER, LYOPHILIZED, FOR SOLUTION INTRAVENOUS at 08:32

## 2025-02-24 RX ADMIN — DEXMEDETOMIDINE HYDROCHLORIDE 1 MCG/KG/HR: 4 INJECTION, SOLUTION INTRAVENOUS at 21:35

## 2025-02-24 RX ADMIN — DILTIAZEM HYDROCHLORIDE 60 MG: 60 TABLET ORAL at 21:36

## 2025-02-24 RX ADMIN — SODIUM CHLORIDE, PRESERVATIVE FREE 10 ML: 5 INJECTION INTRAVENOUS at 20:13

## 2025-02-24 RX ADMIN — INSULIN LISPRO 4 UNITS: 100 INJECTION, SOLUTION INTRAVENOUS; SUBCUTANEOUS at 00:33

## 2025-02-24 RX ADMIN — GABAPENTIN 200 MG: 250 SOLUTION ORAL at 18:12

## 2025-02-24 RX ADMIN — LEVETIRACETAM 750 MG: 100 INJECTION INTRAVENOUS at 21:35

## 2025-02-24 RX ADMIN — MIDAZOLAM HYDROCHLORIDE 2 MG: 1 INJECTION, SOLUTION INTRAMUSCULAR; INTRAVENOUS at 10:54

## 2025-02-24 RX ADMIN — METOCLOPRAMIDE 10 MG: 5 INJECTION, SOLUTION INTRAMUSCULAR; INTRAVENOUS at 08:32

## 2025-02-24 RX ADMIN — THIAMINE HYDROCHLORIDE 200 MG: 100 INJECTION, SOLUTION INTRAMUSCULAR; INTRAVENOUS at 11:34

## 2025-02-24 RX ADMIN — INSULIN GLARGINE 20 UNITS: 100 INJECTION, SOLUTION SUBCUTANEOUS at 08:30

## 2025-02-24 RX ADMIN — MAGNESIUM SULFATE HEPTAHYDRATE 2000 MG: 40 INJECTION, SOLUTION INTRAVENOUS at 11:37

## 2025-02-24 RX ADMIN — DEXMEDETOMIDINE HYDROCHLORIDE 1.1 MCG/KG/HR: 4 INJECTION, SOLUTION INTRAVENOUS at 17:21

## 2025-02-24 RX ADMIN — INSULIN LISPRO 4 UNITS: 100 INJECTION, SOLUTION INTRAVENOUS; SUBCUTANEOUS at 08:30

## 2025-02-24 RX ADMIN — GABAPENTIN 200 MG: 250 SOLUTION ORAL at 14:44

## 2025-02-24 RX ADMIN — ALBUTEROL SULFATE 2.5 MG: 2.5 SOLUTION RESPIRATORY (INHALATION) at 07:30

## 2025-02-24 RX ADMIN — DEXMEDETOMIDINE HYDROCHLORIDE 1.1 MCG/KG/HR: 4 INJECTION, SOLUTION INTRAVENOUS at 01:06

## 2025-02-24 RX ADMIN — METOPROLOL TARTRATE 50 MG: 50 TABLET, FILM COATED ORAL at 21:36

## 2025-02-24 RX ADMIN — DEXMEDETOMIDINE HYDROCHLORIDE 1.1 MCG/KG/HR: 4 INJECTION, SOLUTION INTRAVENOUS at 05:22

## 2025-02-24 RX ADMIN — LEVETIRACETAM 750 MG: 100 INJECTION INTRAVENOUS at 08:32

## 2025-02-24 ASSESSMENT — PULMONARY FUNCTION TESTS
PIF_VALUE: 42
PIF_VALUE: 41
PIF_VALUE: 31
PIF_VALUE: 39
PIF_VALUE: 31
PIF_VALUE: 43
PIF_VALUE: 33
PIF_VALUE: 49
PIF_VALUE: 38
PIF_VALUE: 38
PIF_VALUE: 44
PIF_VALUE: 39
PIF_VALUE: 28
PIF_VALUE: 40
PIF_VALUE: 44
PIF_VALUE: 55
PIF_VALUE: 45
PIF_VALUE: 38
PIF_VALUE: 40
PIF_VALUE: 31
PIF_VALUE: 39

## 2025-02-24 ASSESSMENT — PAIN SCALES - WONG BAKER
WONGBAKER_NUMERICALRESPONSE: NO HURT

## 2025-02-24 ASSESSMENT — PAIN SCALES - GENERAL
PAINLEVEL_OUTOF10: 1

## 2025-02-24 NOTE — PROGRESS NOTES
02/24/25 1554   Patient Observation   Pulse 75   Respirations 22   SpO2 94 %   Patient Observations 7.5 ett 25 at lip   Breath Sounds   Breath Sounds Bilateral Diminished   Vent Information   Vent Mode AC/VC   Ventilator Settings   FiO2  35 %   Vt (Set, mL) 500 mL   Resp Rate (Set) 22 bpm   PEEP/CPAP (cmH2O) 5   Vent Patient Data (Readings)   Vt (Measured) 567 mL   Peak Inspiratory Pressure (cmH2O) 38 cmH2O   Rate Measured 22 br/min   Minute Volume (L/min) 12.5 Liters   Mean Airway Pressure (cmH2O) 13 cmH20   Plateau Pressure (cm H2O) 23 cm H2O   Driving Pressure 18   I:E Ratio 1:3.00   Backup Apnea On   Backup Rate 22 Breaths Per Minute   Backup Vt 500   Vent Alarm Settings   High Pressure (cmH2O) 55 cmH2O   Low Minute Volume (lpm) 4 L/min   RR High (bpm) 40 br/min   Additional Respiratoray Assessments   Humidification Source Heated wire   Humidification Temp 37   Circuit Condensation Drained   Ambu Bag With Mask At Bedside Yes   Airway Clearance   Suction ET Tube   Subglottic Suction Done Yes   Suction Device Blaneine suction catheter   Suction Catheter Size 14 Fr   Sputum Method Obtained Endotracheal   Sputum Amount Small   Sputum Color/Odor White   Sputum Consistency Thick   ETT    Placement Date/Time: 02/14/25 1135   Present on Admission/Arrival: No  Placed By: RT  Placement Verified By: Auscultation;Colorimetric ETCO2 device;Chest X-ray  Preoxygenation: Yes  Airway Tube Size: 7.5 mm  Secured At: 24 cm  Measured From: Lips   Secured At 25 cm   Measured From Lips   ETT Placement Left   Secured By Commercial tube quispe   Site Assessment Dry   Tie/Quispe Changed No   Respiratory   Respiratory Interventions H.O.B. elevated

## 2025-02-24 NOTE — PROGRESS NOTES
General Surgery  Daily Progress Note    Pt Name: Sandor Early  Medical Record Number: [id number]  Date of Birth 1955   Today's Date: 2/24/2025  Admit date: 1/30/2025  LOS: Day 25    SUBJECTIVE  Intubated and sedated.      OBJECTIVE  Vitals:    02/24/25 0310 02/24/25 0400 02/24/25 0500 02/24/25 0600   BP:  (!) 139/58 (!) 129/55 (!) 132/53   Pulse: 72 71 74 74   Resp: 22 22 22 22   Temp:  98.9 °F (37.2 °C)     TempSrc:  Bladder     SpO2: 96% 97% 96% 96%   Weight:    97.7 kg (215 lb 6.2 oz)   Height:           Gen: Sedated  Resp: Intubated.  CV: Regular rate. Regular rhythm.   GI: No pain response with palpation. +Softly distended, similar to prior exams x 3, ?protuberance rather than true distension. +Active but sluggish BS.   Skin: Warm and dry. No nodule or rash on exposed extremities.       I/O last 3 completed shifts:  In: 7923.3 [I.V.:1583; NG/GT:1029; IV Piggyback:1352.2]  Out: 5875 [Urine:5875]  No intake/output data recorded.    LABS  CBC:   Recent Labs     02/22/25  0514 02/23/25  0448 02/24/25 0445   WBC 4.2 4.3 5.0   HGB 7.6* 7.5* 7.6*   HCT 23.2* 22.7* 22.9*   MCV 92.3 91.0 91.3    277 314     BMP:   Recent Labs     02/22/25  0514 02/23/25  0448 02/24/25  0445    137 133*   K 3.7 3.5 3.7   CL 97* 97* 95*   CO2 36* 35* 38*   PHOS  --  3.3 4.0   BUN 14 14 16   CREATININE 0.6* 0.6* 0.6*     LIVER PROFILE:   Recent Labs     02/24/25 0445   AST 45*   ALT 34   BILIDIR <0.1   BILITOT <0.2   ALKPHOS 281*       PT/INR:   No results for input(s): \"PROTIME\", \"INR\" in the last 72 hours.    APTT: No results for input(s): \"APTT\" in the last 72 hours.  UA:  No results for input(s): \"NITRITE\", \"COLORU\", \"PHUR\", \"LABCAST\", \"WBCUA\", \"RBCUA\", \"MUCUS\", \"TRICHOMONAS\", \"YEAST\", \"BACTERIA\", \"CLARITYU\", \"SPECGRAV\", \"LEUKOCYTESUR\", \"UROBILINOGEN\", \"BILIRUBINUR\", \"BLOODU\", \"GLUCOSEU\", \"AMORPHOUS\" in the last 72 hours.    Invalid input(s): \"KETONESU\"        IMAGING  XR CHEST PORTABLE   Final Result   1.

## 2025-02-24 NOTE — PROGRESS NOTES
Pulmonary & Critical Care Medicine ICU Progress Note    CC: COPD respiratory failure    Events of Last 24 hours:   TPN started  83 cc/hr   Propofol 15 mcg/kg/min   Precedex 1.1 mcg/kg/hr   PEEP 5  FiO2 35%  Failed SBT this am       Vascular lines: IV: Left IJ     MV:   Vent Mode: AC/VC Resp Rate (Set): 22 bpm/Vt (Set, mL): 500 mL/ /FiO2 : 35 %  Recent Labs     25   PHART 7.444 7.413   NFD5NHG 48.7* 58.0*   PO2ART 74.9* 74.0*       IV:   PN-Adult Premix  - Standard Electrolytes - Central Line 83 mL/hr at 25    sodium chloride      dexmedeTOMIDine HCl in NaCl 1.1 mcg/kg/hr (25)    propofol 15 mcg/kg/min (25)    sodium chloride      dextrose         Vitals:  Blood pressure (!) 134/51, pulse 69, temperature 98.9 °F (37.2 °C), temperature source Bladder, resp. rate 22, height 1.702 m (5' 7.01\"), weight 97.7 kg (215 lb 6.2 oz), SpO2 97%.  on   Temp  Av.6 °F (37.6 °C)  Min: 98.9 °F (37.2 °C)  Max: 100.4 °F (38 °C)    Intake/Output Summary (Last 24 hours) at 2025 0743  Last data filed at 2025 0615  Gross per 24 hour   Intake 4451 ml   Output 4425 ml   Net 26 ml     EXAM:  General: ill appearing.  Intubated sedated.  Eyes: No sclera icterus. No conjunctival injection.  ENT: No discharge. ET tube in place  Neck: Trachea midline.   Resp: No accessory muscle use. + crackles. No wheezing. + rhonchi.   CV: Regular  rate. Regular rhythm. No mumur or rub. 1-2 LE edema.   GI: Non-tender. Non-distended.   Skin: Warm and dry. No rash on exposed extremities.  M/S: No cyanosis. No clubbing.   Neuro: Intubated sedated.  Not following commands. Responsive to painful stimuli  Psych: Noncommunicative unable to obtain        Scheduled Meds:   bisacodyl  20 mg Rectal Once    metoclopramide  10 mg IntraVENous Q6H    vancomycin  1,250 mg IntraVENous Q12H    insulin lispro  0-16 Units SubCUTAneous Q4H    insulin glargine  20 Units SubCUTAneous BID

## 2025-02-24 NOTE — PROGRESS NOTES
02/24/25 0853   Patient Observation   Pulse 80   Respirations 22   SpO2 94 %   Ventilator Settings   FiO2  35 %   Vt (Set, mL) 500 mL   Resp Rate (Set) 22 bpm   PEEP/CPAP (cmH2O) 5   Vent Patient Data (Readings)   Vt (Measured) 566 mL   Peak Inspiratory Pressure (cmH2O) 38 cmH2O   Rate Measured 22 br/min   Minute Volume (L/min) 12.4 Liters   Mean Airway Pressure (cmH2O) 13 cmH20   Plateau Pressure (cm H2O) 23 cm H2O   Driving Pressure 18   I:E Ratio 1:3.00   Vent Alarm Settings   High Pressure (cmH2O) 55 cmH2O   Low Minute Volume (lpm) 4 L/min   RR High (bpm) 40 br/min   Ventilator Associated Pneumonia Bundle   Elevation of Head of Bed to 30-45 Degrees  Yes   Oral Care Completed Yes   Oral Care Performed Mouthwash;Mouth swabbed;Mouth suctioned   Oral Suctioning Yes   ETT/Trach Suctioning Yes   Respiratory   Respiratory Interventions H.O.B. elevated

## 2025-02-24 NOTE — PLAN OF CARE
Problem: Chronic Conditions and Co-morbidities  Goal: Patient's chronic conditions and co-morbidity symptoms are monitored and maintained or improved  2/24/2025 1307 by Katiana Rodriguez RN  Outcome: Progressing  2/23/2025 2311 by Claire Salazar RN  Outcome: Progressing     Problem: Discharge Planning  Goal: Discharge to home or other facility with appropriate resources  2/23/2025 2311 by Claire Salazar RN  Outcome: Progressing     Problem: Safety - Adult  Goal: Free from fall injury  2/24/2025 1307 by Katiana Rodriguez RN  Outcome: Progressing  2/23/2025 2311 by Claire Salazar RN  Outcome: Progressing     Problem: Pain  Goal: Verbalizes/displays adequate comfort level or baseline comfort level  2/24/2025 1307 by Katiana Rodriguez RN  Outcome: Progressing  2/23/2025 2311 by Claire Salazar RN  Outcome: Progressing     Problem: Respiratory - Adult  Goal: Achieves optimal ventilation and oxygenation  2/24/2025 1307 by Katiana Rodriguez RN  Outcome: Progressing  2/23/2025 2311 by Claire Salazar RN  Outcome: Progressing     Problem: Cardiovascular - Adult  Goal: Maintains optimal cardiac output and hemodynamic stability  2/24/2025 1307 by Katiana Rodriguez RN  Outcome: Progressing  2/23/2025 2311 by Claire Salazar RN  Outcome: Progressing  Goal: Absence of cardiac dysrhythmias or at baseline  2/24/2025 1307 by Katiana Rodriguez RN  Outcome: Progressing  2/23/2025 2311 by Claire Salazar RN  Outcome: Progressing     Problem: Skin/Tissue Integrity - Adult  Goal: Skin integrity remains intact  Description: 1.  Monitor for areas of redness and/or skin breakdown  2.  Assess vascular access sites hourly  3.  Every 4-6 hours minimum:  Change oxygen saturation probe site  4.  Every 4-6 hours:  If on nasal continuous positive airway pressure, respiratory therapy assess nares and determine need for appliance change or resting period  2/24/2025 1307 by Katiana Rodriguez RN  Outcome: Progressing  2/23/2025 2311 by Claire Salazar

## 2025-02-24 NOTE — PROGRESS NOTES
02/23/25 1900   RT Protocol   History Pulmonary Disease 2   Respiratory pattern 2   Breath sounds 2   Cough 1   Indications for Bronchodilator Therapy Decreased or absent breath sounds   Bronchodilator Assessment Score 7     RT Inhaler-Nebulizer Bronchodilator Protocol Note    There is a bronchodilator order in the chart from a provider indicating to follow the RT Bronchodilator Protocol and there is an “Initiate RT Inhaler-Nebulizer Bronchodilator Protocol” order as well (see protocol at bottom of note).    CXR Findings:  XR CHEST PORTABLE    Result Date: 2/23/2025  Bibasilar opacities are redemonstrated. Endotracheal tube, nasogastric tube, and left jugular line are stable.     XR CHEST PORTABLE    Result Date: 2/22/2025  Increasing basilar opacities may reflect developing infiltrate/effusions. Study is somewhat limited secondary to rotation but no other significant change identified.       The findings from the last RT Protocol Assessment were as follows:   History Pulmonary Disease: Chronic pulmonary disease  Respiratory Pattern: Dyspnea on exertion or RR 21-25 bpm  Breath Sounds: Slightly diminished and/or crackles  Cough: Strong, productive  Indication for Bronchodilator Therapy: Decreased or absent breath sounds  Bronchodilator Assessment Score: 7    Aerosolized bronchodilator medication orders have been revised according to the RT Inhaler-Nebulizer Bronchodilator Protocol below.    Respiratory Therapist to perform RT Therapy Protocol Assessment initially then follow the protocol.  Repeat RT Therapy Protocol Assessment PRN for score 0-3 or on second treatment, BID, and PRN for scores above 3.    No Indications - adjust the frequency to every 6 hours PRN wheezing or bronchospasm, if no treatments needed after 48 hours then discontinue using Per Protocol order mode.     If indication present, adjust the RT bronchodilator orders based on the Bronchodilator Assessment Score as indicated below.  Use Inhaler

## 2025-02-24 NOTE — PROGRESS NOTES
Pt intubated and sedated.    Intubated with # 7.5 at the # 25 LL.   Vent settings are 22, 500, 35%, and 5.    Breath sounds are diminished bilaterally and expiratory wheezes in right upper lobe.    Heart rhythm is normal sinus on the bedside monitor with rates in the 60s.       Scheduled meds given per orders.    NG at 65 cm. TF stopped for ng placement re-verification.    Left IJ CVC patent and working well.   Propofol paused for SBT.   Precedex infusing.  RASS 0  CPOT 1

## 2025-02-24 NOTE — PROGRESS NOTES
Patient report given to CONSTANTINO Izaguirre. Patient has rested comfortably overnight on the ventilator without acute changes in assessment noted. Care transferred.

## 2025-02-24 NOTE — PROGRESS NOTES
02/23/25 1949   Patient Observation   Pulse 81   Respirations 22   SpO2 95 %   Patient Observations 7.5 ett 25 at lip   Breath Sounds   Right Upper Lobe Diminished   Right Middle Lobe Diminished   Right Lower Lobe Diminished   Left Upper Lobe Diminished   Left Lower Lobe Diminished   Vent Information   Vent Mode AC/VC   Ventilator Settings   FiO2  35 %   Vt (Set, mL) 500 mL   Resp Rate (Set) 22 bpm   PEEP/CPAP (cmH2O) 5   Vent Patient Data (Readings)   Vt (Measured) 545 mL   Peak Inspiratory Pressure (cmH2O) 33 cmH2O   Rate Measured 22 br/min   Minute Volume (L/min) 11.9 Liters   Mean Airway Pressure (cmH2O) 12 cmH20   Plateau Pressure (cm H2O) 26 cm H2O   Driving Pressure 21   I:E Ratio 1:3.00   Flow Sensitivity 3 L/min   Static Compliance (L/cm H2O) 31   Dynamic Compliance (L/cm H2O) 17 L/cm H2O   Vent Alarm Settings   High Pressure (cmH2O) 55 cmH2O   Low Minute Volume (lpm) 4 L/min   Low Exhaled Vt (ml) 250 mL   RR High (bpm) 40 br/min   Apnea (secs) 20 secs   Additional Respiratoray Assessments   Humidification Source Heated wire   Humidification Temp 36.8   Circuit Condensation Drained   Ambu Bag With Mask At Bedside Yes   Airway Clearance   Suction ET Tube   Suction Device Inline suction catheter   Sputum Method Obtained Endotracheal   Sputum Amount Small   Sputum Color/Odor White   Sputum Consistency Thick   ETT    Placement Date/Time: 02/14/25 1135   Present on Admission/Arrival: No  Placed By: RT  Placement Verified By: Auscultation;Colorimetric ETCO2 device;Chest X-ray  Preoxygenation: Yes  Airway Tube Size: 7.5 mm  Secured At: 24 cm  Measured From: Lips   Secured At 25 cm   Measured From Lips   ETT Placement Right   Secured By Commercial tube may   Treatment   $Treatment Type $Inhaled Therapy/Meds

## 2025-02-24 NOTE — PROGRESS NOTES
02/23/25 2245   Patient Observation   Pulse 74   Respirations 22   SpO2 96 %   Patient Observations 7.5 ett 25 at lip   Breath Sounds   Right Upper Lobe Diminished   Right Middle Lobe Diminished   Right Lower Lobe Diminished   Left Upper Lobe Diminished   Left Lower Lobe Diminished   Vent Information   Vent Mode AC/VC   Ventilator Settings   FiO2  35 %   Vt (Set, mL) 500 mL   Resp Rate (Set) 22 bpm   PEEP/CPAP (cmH2O) 5   Vent Patient Data (Readings)   Vt (Measured) 568 mL   Peak Inspiratory Pressure (cmH2O) 39 cmH2O   Rate Measured 22 br/min   Minute Volume (L/min) 12.5 Liters   Mean Airway Pressure (cmH2O) 13 cmH20   Plateau Pressure (cm H2O) 23 cm H2O   Driving Pressure 18   I:E Ratio 1:3.00   Flow Sensitivity 3 L/min   Vent Alarm Settings   High Pressure (cmH2O) 55 cmH2O   Low Minute Volume (lpm) 4 L/min   Low Exhaled Vt (ml) 250 mL   RR High (bpm) 40 br/min   Apnea (secs) 20 secs   Additional Respiratoray Assessments   Humidification Source Heated wire   Humidification Temp 37   Circuit Condensation Drained   Ambu Bag With Mask At Bedside Yes   Airway Clearance   Suction ET Tube   Suction Device Inline suction catheter   Sputum Method Obtained Endotracheal   Sputum Amount Small   Sputum Color/Odor White   Sputum Consistency Thick   ETT    Placement Date/Time: 02/14/25 1135   Present on Admission/Arrival: No  Placed By: RT  Placement Verified By: Auscultation;Colorimetric ETCO2 device;Chest X-ray  Preoxygenation: Yes  Airway Tube Size: 7.5 mm  Secured At: 24 cm  Measured From: Lips   Secured At 25 cm   Measured From Lips   ETT Placement Center   Secured By Commercial tube may

## 2025-02-24 NOTE — PROGRESS NOTES
02/24/25 0310   Patient Observation   Pulse 72   Respirations 22   SpO2 96 %   Patient Observations 7.5 ett 25 at lip   Breath Sounds   Respiratory Pattern Regular   Right Upper Lobe Diminished  (Simultaneous filing. User may not have seen previous data.)   Right Middle Lobe Diminished  (Simultaneous filing. User may not have seen previous data.)   Right Lower Lobe Diminished  (Simultaneous filing. User may not have seen previous data.)   Left Upper Lobe Diminished  (Simultaneous filing. User may not have seen previous data.)   Left Lower Lobe Diminished  (Simultaneous filing. User may not have seen previous data.)   Vent Information   Vent Mode AC/VC   Ventilator Settings   FiO2  35 %   Vt (Set, mL) 500 mL   Resp Rate (Set) 22 bpm   PEEP/CPAP (cmH2O) 5   Vent Patient Data (Readings)   Vt (Measured) 571 mL   Peak Inspiratory Pressure (cmH2O) 39 cmH2O   Rate Measured 23 br/min   Minute Volume (L/min) 12.9 Liters   Mean Airway Pressure (cmH2O) 14 cmH20   Plateau Pressure (cm H2O) 23 cm H2O   Driving Pressure 18   I:E Ratio 1:2.90   Flow Sensitivity 3 L/min   Vent Alarm Settings   High Pressure (cmH2O) 55 cmH2O   Low Minute Volume (lpm) 4 L/min   Low Exhaled Vt (ml) 250 mL   RR High (bpm) 40 br/min   Apnea (secs) 20 secs   Additional Respiratoray Assessments   Humidification Source Heated wire   Humidification Temp 36.9   Circuit Condensation Drained   Ambu Bag With Mask At Bedside Yes   Airway Clearance   Suction ET Tube   Suction Device Inline suction catheter   Sputum Method Obtained Endotracheal   Sputum Amount Small   Sputum Color/Odor White   Sputum Consistency Thick   ETT    Placement Date/Time: 02/14/25 1135   Present on Admission/Arrival: No  Placed By: RT  Placement Verified By: Auscultation;Colorimetric ETCO2 device;Chest X-ray  Preoxygenation: Yes  Airway Tube Size: 7.5 mm  Secured At: 24 cm  Measured From: Lips   Secured At 25 cm  (Simultaneous filing. User may not have seen previous data.)   Measured

## 2025-02-24 NOTE — PROGRESS NOTES
Progress Note    Admit Date:  1/30/2025    Interval history:  Influenza A and Acute respiratory failure   Was on bipap in iCU and improved, transferred to [facility]        Has NG placed for Ileus  , he pulled out NG and likely has aspirated became hypoxic and transferred to ICU      Seizure like event 2/7,   Remains confused  MRI brain incomplete .     2/13   Mentation worse today  On precedex   On 4 L of O2     2/21  Ct with resolving ileus  Started on TF @15 cc   Started weaning sedation  Repeat CT abd with no acute findings     2/22  S/p bronch yesterday.  Sedated on vent.    Subjective:  Mr. Early failed SBT this am. Ill appearing. TPN started on Propofol, Preceedex      Objective:   Patient Vitals for the past 4 hrs:   BP Temp Temp src Pulse Resp SpO2   02/24/25 1200 (!) 141/59 -- -- (!) 104 20 95 %   02/24/25 1149 -- -- -- (!) 114 27 98 %   02/24/25 1100 (!) 163/67 98.4 °F (36.9 °C) Bladder (!) 107 26 94 %   02/24/25 1026 -- -- -- 95 26 93 %   02/24/25 1000 (!) 137/59 -- -- 96 26 92 %          Intake/Output Summary (Last 24 hours) at 2/24/2025 1348  Last data filed at 2/24/2025 1200  Gross per 24 hour   Intake 5013.61 ml   Output 4250 ml   Net 763.61 ml       Physical Exam:        General:  elderly male sedated on vent  Oral ETT and OG noted  Left IJ TLC . Appears to be not in any distress  Mucous Membranes:  Pink , anicteric  Neck: No JVD, no carotid bruit, no thyromegaly  Chest:  Clear to auscultation bilaterally, resolved wheeze  Cardiovascular:  RRR S1S2 heard, no murmurs or gallops  Abdomen:  Soft, obese +distended, non tender, no organomegaly, BS absent  Extremities: trace edema to ext improved   Distal pulses well felt  Neurological : sedated        Medications:  furosemide, 40 mg, Daily  metoclopramide, 10 mg, Q6H  insulin lispro, 0-16 Units, Q4H  insulin glargine, 20 Units, BID  levETIRAcetam, 750 mg, BID  dilTIAZem, 60 mg, 3 times per day  metoprolol tartrate, 50 mg, BID  Gabapentin, 200 mg, 4x

## 2025-02-24 NOTE — PROGRESS NOTES
SBT of 5/5 started, patient immediately had increased WOB, and RR of 29. Switched back to previous full support settings.

## 2025-02-24 NOTE — PROGRESS NOTES
2/24  Vanc trough = 18.9 mcg/mL at 0445.  Calculated AUC of 555.  Continue current dose and frequency of vancomycin.  Will monitor and adjust dose accordingly.  Emory Solano PharmD  2/24/2025 6:07 AM

## 2025-02-24 NOTE — PROGRESS NOTES
02/24/25 1026   Patient Observation   Pulse 95   Respirations 26   SpO2 93 %   Patient Observations 7.5 ett 25 at lip   Breath Sounds   Breath Sounds Bilateral Diminished   Vent Information   Vent Mode AC/VC   Ventilator Settings   FiO2  35 %   Vt (Set, mL) 500 mL   Resp Rate (Set) 22 bpm   PEEP/CPAP (cmH2O) 5   Vent Patient Data (Readings)   Vt (Measured) 583 mL   Peak Inspiratory Pressure (cmH2O) 31 cmH2O   Rate Measured 25 br/min   Minute Volume (L/min) 14.4 Liters   Mean Airway Pressure (cmH2O) 13 cmH20   Plateau Pressure (cm H2O) 23 cm H2O   Driving Pressure 18   I:E Ratio 1:2.50   Flow Sensitivity 3 L/min   Backup Apnea On   Backup Rate 22 Breaths Per Minute   Backup Vt 500   Vent Alarm Settings   High Pressure (cmH2O) 55 cmH2O   Low Minute Volume (lpm) 4 L/min   RR High (bpm) 40 br/min   Additional Respiratoray Assessments   Humidification Source Heated wire   Humidification Temp 37   Circuit Condensation Drained   Ambu Bag With Mask At Bedside Yes   ETT    Placement Date/Time: 02/14/25 1135   Present on Admission/Arrival: No  Placed By: RT  Placement Verified By: Auscultation;Colorimetric ETCO2 device;Chest X-ray  Preoxygenation: Yes  Airway Tube Size: 7.5 mm  Secured At: 24 cm  Measured From: Lips   Secured At 25 cm   Measured From Lips   ETT Placement Right   Secured By Commercial tube may   Site Assessment Dry   Respiratory   Respiratory Interventions H.O.B. elevated

## 2025-02-24 NOTE — CARE COORDINATION
Patient remains on ventilator.    Originally patient wanted to discharge to [facility]. Will readdress plan when appropriate.

## 2025-02-24 NOTE — PROGRESS NOTES
Patient care assumed, assessment completed as charted. Patient continues on ventilator, #7.5 at the 25 lip line. A/C 22, , FiO2 35%, PEEP 5. Left IJ CVC in place with Precedex infusing at 1.1mcg.kg/hr, Propofol at 10mcg/kg/min, and TPN at 83mL/hr. NG in place with tube feed running at 35mL/hr, chavira catheter patent, bilateral soft wrist restraints continue for patient safety. No further needs assessed at this time. Will monitor.

## 2025-02-25 ENCOUNTER — APPOINTMENT (OUTPATIENT)
Dept: GENERAL RADIOLOGY | Age: 70
DRG: 207 | End: 2025-02-25
Payer: MEDICARE

## 2025-02-25 LAB
ANION GAP SERPL CALCULATED.3IONS-SCNC: 4 MMOL/L (ref 3–16)
ANISOCYTOSIS BLD QL SMEAR: ABNORMAL
BASE EXCESS BLDA CALC-SCNC: 10 MMOL/L (ref -3–3)
BASE EXCESS BLDA CALC-SCNC: 7.7 MMOL/L (ref -3–3)
BASOPHILS # BLD: 0 K/UL (ref 0–0.2)
BASOPHILS NFR BLD: 0 %
BUN SERPL-MCNC: 15 MG/DL (ref 7–20)
CALCIUM SERPL-MCNC: 8 MG/DL (ref 8.3–10.6)
CHLORIDE SERPL-SCNC: 97 MMOL/L (ref 99–110)
CO2 BLDA-SCNC: 33.4 MMOL/L
CO2 BLDA-SCNC: 36 MMOL/L
CO2 SERPL-SCNC: 38 MMOL/L (ref 21–32)
COHGB MFR BLDA: 0.5 % (ref 0–1.5)
COHGB MFR BLDA: 0.6 % (ref 0–1.5)
CREAT SERPL-MCNC: 0.6 MG/DL (ref 0.8–1.3)
DEPRECATED RDW RBC AUTO: 16.9 % (ref 12.4–15.4)
EOSINOPHIL # BLD: 0 K/UL (ref 0–0.6)
EOSINOPHIL NFR BLD: 0 %
GFR SERPLBLD CREATININE-BSD FMLA CKD-EPI: >90 ML/MIN/{1.73_M2}
GLUCOSE BLD-MCNC: 104 MG/DL (ref 70–99)
GLUCOSE BLD-MCNC: 109 MG/DL (ref 70–99)
GLUCOSE BLD-MCNC: 116 MG/DL (ref 70–99)
GLUCOSE BLD-MCNC: 116 MG/DL (ref 70–99)
GLUCOSE BLD-MCNC: 119 MG/DL (ref 70–99)
GLUCOSE BLD-MCNC: 121 MG/DL (ref 70–99)
GLUCOSE BLD-MCNC: 99 MG/DL (ref 70–99)
GLUCOSE SERPL-MCNC: 100 MG/DL (ref 70–99)
HCO3 BLDA-SCNC: 32 MMOL/L (ref 21–29)
HCO3 BLDA-SCNC: 34.6 MMOL/L (ref 21–29)
HCT VFR BLD AUTO: 22.2 % (ref 40.5–52.5)
HGB BLD-MCNC: 7.3 G/DL (ref 13.5–17.5)
HGB BLDA-MCNC: 8 G/DL (ref 13.5–17.5)
HGB BLDA-MCNC: 8.1 G/DL (ref 13.5–17.5)
LYMPHOCYTES # BLD: 0.7 K/UL (ref 1–5.1)
LYMPHOCYTES NFR BLD: 14 %
MAGNESIUM SERPL-MCNC: 1.69 MG/DL (ref 1.8–2.4)
MCH RBC QN AUTO: 29.9 PG (ref 26–34)
MCHC RBC AUTO-ENTMCNC: 33 G/DL (ref 31–36)
MCV RBC AUTO: 90.4 FL (ref 80–100)
METHGB MFR BLDA: 0.2 %
METHGB MFR BLDA: 0.3 %
MONOCYTES # BLD: 0.6 K/UL (ref 0–1.3)
MONOCYTES NFR BLD: 11 %
MYELOCYTES NFR BLD MANUAL: 1 %
NEUTROPHILS # BLD: 3.8 K/UL (ref 1.7–7.7)
NEUTROPHILS NFR BLD: 74 %
NRBC BLD-RTO: 1 /100 WBC
O2 THERAPY: ABNORMAL
O2 THERAPY: ABNORMAL
PCO2 BLDA: 44.7 MMHG (ref 35–45)
PCO2 BLDA: 47.7 MMHG (ref 35–45)
PERFORMED ON: ABNORMAL
PERFORMED ON: NORMAL
PH BLDA: 7.47 [PH] (ref 7.35–7.45)
PH BLDA: 7.48 [PH] (ref 7.35–7.45)
PHOSPHATE SERPL-MCNC: 4 MG/DL (ref 2.5–4.9)
PLATELET # BLD AUTO: 305 K/UL (ref 135–450)
PLATELET BLD QL SMEAR: ADEQUATE
PMV BLD AUTO: 8 FL (ref 5–10.5)
PO2 BLDA: 65.8 MMHG (ref 75–108)
PO2 BLDA: 66.2 MMHG (ref 75–108)
POLYCHROMASIA BLD QL SMEAR: ABNORMAL
POTASSIUM SERPL-SCNC: 3.9 MMOL/L (ref 3.5–5.1)
RBC # BLD AUTO: 2.45 M/UL (ref 4.2–5.9)
SAO2 % BLDA: 94 %
SAO2 % BLDA: 94.1 %
SLIDE REVIEW: ABNORMAL
SODIUM SERPL-SCNC: 139 MMOL/L (ref 136–145)
WBC # BLD AUTO: 5.1 K/UL (ref 4–11)

## 2025-02-25 PROCEDURE — 6360000002 HC RX W HCPCS

## 2025-02-25 PROCEDURE — C1751 CATH, INF, PER/CENT/MIDLINE: HCPCS

## 2025-02-25 PROCEDURE — 71045 X-RAY EXAM CHEST 1 VIEW: CPT

## 2025-02-25 PROCEDURE — 36410 VNPNXR 3YR/> PHY/QHP DX/THER: CPT

## 2025-02-25 PROCEDURE — 94761 N-INVAS EAR/PLS OXIMETRY MLT: CPT

## 2025-02-25 PROCEDURE — 6370000000 HC RX 637 (ALT 250 FOR IP): Performed by: INTERNAL MEDICINE

## 2025-02-25 PROCEDURE — 2700000000 HC OXYGEN THERAPY PER DAY

## 2025-02-25 PROCEDURE — 80048 BASIC METABOLIC PNL TOTAL CA: CPT

## 2025-02-25 PROCEDURE — 6360000002 HC RX W HCPCS: Performed by: INTERNAL MEDICINE

## 2025-02-25 PROCEDURE — 05HB33Z INSERTION OF INFUSION DEVICE INTO RIGHT BASILIC VEIN, PERCUTANEOUS APPROACH: ICD-10-PCS | Performed by: INTERNAL MEDICINE

## 2025-02-25 PROCEDURE — 76937 US GUIDE VASCULAR ACCESS: CPT

## 2025-02-25 PROCEDURE — 05HB33Z INSERTION OF INFUSION DEVICE INTO RIGHT BASILIC VEIN, PERCUTANEOUS APPROACH: ICD-10-PCS

## 2025-02-25 PROCEDURE — 94640 AIRWAY INHALATION TREATMENT: CPT

## 2025-02-25 PROCEDURE — 94003 VENT MGMT INPAT SUBQ DAY: CPT

## 2025-02-25 PROCEDURE — 84100 ASSAY OF PHOSPHORUS: CPT

## 2025-02-25 PROCEDURE — 83735 ASSAY OF MAGNESIUM: CPT

## 2025-02-25 PROCEDURE — 2000000000 HC ICU R&B

## 2025-02-25 PROCEDURE — 2580000003 HC RX 258: Performed by: INTERNAL MEDICINE

## 2025-02-25 PROCEDURE — 82803 BLOOD GASES ANY COMBINATION: CPT

## 2025-02-25 PROCEDURE — 6360000002 HC RX W HCPCS: Performed by: STUDENT IN AN ORGANIZED HEALTH CARE EDUCATION/TRAINING PROGRAM

## 2025-02-25 PROCEDURE — 2500000003 HC RX 250 WO HCPCS: Performed by: INTERNAL MEDICINE

## 2025-02-25 PROCEDURE — 99232 SBSQ HOSP IP/OBS MODERATE 35: CPT | Performed by: INTERNAL MEDICINE

## 2025-02-25 PROCEDURE — 99291 CRITICAL CARE FIRST HOUR: CPT | Performed by: INTERNAL MEDICINE

## 2025-02-25 PROCEDURE — 85025 COMPLETE CBC W/AUTO DIFF WBC: CPT

## 2025-02-25 RX ORDER — FUROSEMIDE 10 MG/ML
40 INJECTION INTRAMUSCULAR; INTRAVENOUS EVERY 12 HOURS
Status: DISCONTINUED | OUTPATIENT
Start: 2025-02-25 | End: 2025-02-27

## 2025-02-25 RX ORDER — SODIUM CHLORIDE 9 MG/ML
INJECTION, SOLUTION INTRAVENOUS PRN
Status: DISCONTINUED | OUTPATIENT
Start: 2025-02-25 | End: 2025-03-25 | Stop reason: HOSPADM

## 2025-02-25 RX ORDER — LANOLIN ALCOHOL/MO/W.PET/CERES
200 CREAM (GRAM) TOPICAL DAILY
Status: DISCONTINUED | OUTPATIENT
Start: 2025-02-25 | End: 2025-03-04

## 2025-02-25 RX ORDER — SODIUM CHLORIDE 0.9 % (FLUSH) 0.9 %
5-40 SYRINGE (ML) INJECTION EVERY 12 HOURS SCHEDULED
Status: DISCONTINUED | OUTPATIENT
Start: 2025-02-25 | End: 2025-03-25 | Stop reason: HOSPADM

## 2025-02-25 RX ORDER — LIDOCAINE HYDROCHLORIDE 10 MG/ML
50 INJECTION, SOLUTION INFILTRATION; PERINEURAL ONCE
Status: DISCONTINUED | OUTPATIENT
Start: 2025-02-25 | End: 2025-03-08

## 2025-02-25 RX ORDER — SODIUM CHLORIDE 0.9 % (FLUSH) 0.9 %
5-40 SYRINGE (ML) INJECTION PRN
Status: DISCONTINUED | OUTPATIENT
Start: 2025-02-25 | End: 2025-03-25 | Stop reason: HOSPADM

## 2025-02-25 RX ADMIN — ALLOPURINOL 300 MG: 300 TABLET ORAL at 08:24

## 2025-02-25 RX ADMIN — DILTIAZEM HYDROCHLORIDE 60 MG: 60 TABLET ORAL at 06:18

## 2025-02-25 RX ADMIN — PANTOPRAZOLE SODIUM 40 MG: 40 INJECTION, POWDER, LYOPHILIZED, FOR SOLUTION INTRAVENOUS at 08:15

## 2025-02-25 RX ADMIN — INSULIN GLARGINE 20 UNITS: 100 INJECTION, SOLUTION SUBCUTANEOUS at 08:22

## 2025-02-25 RX ADMIN — ALBUTEROL SULFATE 2.5 MG: 2.5 SOLUTION RESPIRATORY (INHALATION) at 07:58

## 2025-02-25 RX ADMIN — METOPROLOL TARTRATE 50 MG: 50 TABLET, FILM COATED ORAL at 21:03

## 2025-02-25 RX ADMIN — GABAPENTIN 200 MG: 250 SOLUTION ORAL at 12:54

## 2025-02-25 RX ADMIN — METOPROLOL TARTRATE 50 MG: 50 TABLET, FILM COATED ORAL at 08:24

## 2025-02-25 RX ADMIN — DILTIAZEM HYDROCHLORIDE 60 MG: 60 TABLET ORAL at 21:02

## 2025-02-25 RX ADMIN — INSULIN GLARGINE 20 UNITS: 100 INJECTION, SOLUTION SUBCUTANEOUS at 21:08

## 2025-02-25 RX ADMIN — DEXMEDETOMIDINE HYDROCHLORIDE 0.9 MCG/KG/HR: 4 INJECTION, SOLUTION INTRAVENOUS at 09:48

## 2025-02-25 RX ADMIN — DEXMEDETOMIDINE HYDROCHLORIDE 1.1 MCG/KG/HR: 4 INJECTION, SOLUTION INTRAVENOUS at 15:05

## 2025-02-25 RX ADMIN — DEXMEDETOMIDINE HYDROCHLORIDE 1.1 MCG/KG/HR: 4 INJECTION, SOLUTION INTRAVENOUS at 02:11

## 2025-02-25 RX ADMIN — LEVETIRACETAM 750 MG: 100 INJECTION INTRAVENOUS at 21:00

## 2025-02-25 RX ADMIN — DILTIAZEM HYDROCHLORIDE 60 MG: 60 TABLET ORAL at 14:21

## 2025-02-25 RX ADMIN — ATORVASTATIN CALCIUM 40 MG: 40 TABLET, FILM COATED ORAL at 08:24

## 2025-02-25 RX ADMIN — METOCLOPRAMIDE 10 MG: 5 INJECTION, SOLUTION INTRAMUSCULAR; INTRAVENOUS at 14:21

## 2025-02-25 RX ADMIN — LEVETIRACETAM 750 MG: 100 INJECTION INTRAVENOUS at 08:13

## 2025-02-25 RX ADMIN — GABAPENTIN 200 MG: 250 SOLUTION ORAL at 08:24

## 2025-02-25 RX ADMIN — Medication 5000 UNITS: at 08:24

## 2025-02-25 RX ADMIN — METOCLOPRAMIDE 10 MG: 5 INJECTION, SOLUTION INTRAMUSCULAR; INTRAVENOUS at 02:03

## 2025-02-25 RX ADMIN — FUROSEMIDE 40 MG: 10 INJECTION, SOLUTION INTRAMUSCULAR; INTRAVENOUS at 21:00

## 2025-02-25 RX ADMIN — MIDAZOLAM HYDROCHLORIDE 2 MG: 1 INJECTION, SOLUTION INTRAMUSCULAR; INTRAVENOUS at 14:28

## 2025-02-25 RX ADMIN — ALBUTEROL SULFATE 2.5 MG: 2.5 SOLUTION RESPIRATORY (INHALATION) at 19:53

## 2025-02-25 RX ADMIN — FUROSEMIDE 40 MG: 10 INJECTION, SOLUTION INTRAMUSCULAR; INTRAVENOUS at 08:16

## 2025-02-25 RX ADMIN — ALBUTEROL SULFATE 2.5 MG: 2.5 SOLUTION RESPIRATORY (INHALATION) at 11:48

## 2025-02-25 RX ADMIN — DEXMEDETOMIDINE HYDROCHLORIDE 0.9 MCG/KG/HR: 4 INJECTION, SOLUTION INTRAVENOUS at 23:49

## 2025-02-25 RX ADMIN — METOCLOPRAMIDE 10 MG: 5 INJECTION, SOLUTION INTRAMUSCULAR; INTRAVENOUS at 20:59

## 2025-02-25 RX ADMIN — MAGNESIUM SULFATE HEPTAHYDRATE 2000 MG: 40 INJECTION, SOLUTION INTRAVENOUS at 09:45

## 2025-02-25 RX ADMIN — Medication 200 MG: at 10:08

## 2025-02-25 RX ADMIN — ENOXAPARIN SODIUM 40 MG: 100 INJECTION SUBCUTANEOUS at 08:24

## 2025-02-25 RX ADMIN — GABAPENTIN 200 MG: 250 SOLUTION ORAL at 17:17

## 2025-02-25 RX ADMIN — MIDAZOLAM HYDROCHLORIDE 2 MG: 1 INJECTION, SOLUTION INTRAMUSCULAR; INTRAVENOUS at 23:37

## 2025-02-25 RX ADMIN — GABAPENTIN 200 MG: 250 SOLUTION ORAL at 21:01

## 2025-02-25 RX ADMIN — DEXMEDETOMIDINE HYDROCHLORIDE 0.9 MCG/KG/HR: 4 INJECTION, SOLUTION INTRAVENOUS at 18:44

## 2025-02-25 RX ADMIN — DEXMEDETOMIDINE HYDROCHLORIDE 1.1 MCG/KG/HR: 4 INJECTION, SOLUTION INTRAVENOUS at 05:34

## 2025-02-25 RX ADMIN — METOCLOPRAMIDE 10 MG: 5 INJECTION, SOLUTION INTRAMUSCULAR; INTRAVENOUS at 08:16

## 2025-02-25 RX ADMIN — ALBUTEROL SULFATE 2.5 MG: 2.5 SOLUTION RESPIRATORY (INHALATION) at 15:26

## 2025-02-25 ASSESSMENT — PULMONARY FUNCTION TESTS
PIF_VALUE: 36
PIF_VALUE: 28
PIF_VALUE: 27
PIF_VALUE: 29
PIF_VALUE: 41
PIF_VALUE: 40
PIF_VALUE: 28
PIF_VALUE: 44
PIF_VALUE: 55
PIF_VALUE: 35
PIF_VALUE: 40
PIF_VALUE: 41
PIF_VALUE: 25
PIF_VALUE: 37
PIF_VALUE: 24

## 2025-02-25 ASSESSMENT — PAIN SCALES - GENERAL
PAINLEVEL_OUTOF10: 0

## 2025-02-25 NOTE — PROGRESS NOTES
Pulmonary & Critical Care Medicine ICU Progress Note    CC: COPD respiratory failure    Events of Last 24 hours:   TPN started -    Propofol off since yesterday   Precedex 0.9 mcg/kg/hr   PEEP 5  FiO2 35%  Failed SBT this am due to hypoxia and increased WOB  More awake today follow commands    Vascular lines: IV: Left IJ     MV:   Vent Mode: AC/VC Resp Rate (Set): 22 bpm/Vt (Set, mL): 500 mL/ /FiO2 : 35 %  Recent Labs     25  0445 25  0500   PHART 7.413 7.478*   XRV0CJX 58.0* 47.7*   PO2ART 74.0* 66.2*       IV:   sodium chloride      dexmedeTOMIDine HCl in NaCl 1.1 mcg/kg/hr (25 06)    propofol Stopped (25)    sodium chloride      dextrose         Vitals:  Blood pressure (!) 123/59, pulse 92, temperature 99.8 °F (37.7 °C), temperature source Bladder, resp. rate 26, height 1.702 m (5' 7.01\"), weight 95.9 kg (211 lb 6.7 oz), SpO2 96%.  on   Temp  Av.6 °F (37.6 °C)  Min: 98.4 °F (36.9 °C)  Max: 100.2 °F (37.9 °C)    Intake/Output Summary (Last 24 hours) at 2025 0734  Last data filed at 2025 0626  Gross per 24 hour   Intake 2688.34 ml   Output 2455 ml   Net 233.34 ml     EXAM:  General: ill appearing.  Intubated sedated.  Eyes: No sclera icterus. No conjunctival injection.  ENT: No discharge. ET tube in place  Neck: Trachea midline.   Resp: No accessory muscle use. Few crackles. No wheezing. + rhonchi.   CV: Regular  rate. Regular rhythm. No mumur or rub. 1-2 LE edema.   GI: Non-tender. Non-distended.   Skin: Warm and dry. No rash on exposed extremities.  M/S: No cyanosis. No clubbing.   Neuro: Intubated sedated.  + following commands. Responsive to painful stimuli  Psych: Noncommunicative unable to obtain        Scheduled Meds:   furosemide  40 mg IntraVENous Daily    metoclopramide  10 mg IntraVENous Q6H    insulin lispro  0-16 Units SubCUTAneous Q4H    insulin glargine  20 Units SubCUTAneous BID    levETIRAcetam  750 mg IntraVENous BID    dilTIAZem

## 2025-02-25 NOTE — PLAN OF CARE
Patient is not able to demonstrate the ability to move from a reclining position to an upright position within the recliner due to being sedated and intubated.  4 Eyes Skin Assessment     NAME:  Sandor Early  YOB: 1955  MEDICAL RECORD NUMBER:  [id number]    The patient is being assessed for  Shift Handoff    I agree that at least one RN has performed a thorough Head to Toe Skin Assessment on the patient. ALL assessment sites listed below have been assessed.      Areas assessed by both nurses:    Head, Face, Ears, Shoulders, Back, Chest, Arms, Elbows, Hands, Sacrum. Buttock, Coccyx, Ischium, Legs. Feet and Heels, and Under Medical Devices         Does the Patient have a Wound? No noted wound(s)       Abraham Prevention initiated by RN: Yes  Wound Care Orders initiated by RN: No    Pressure Injury (Stage 3,4, Unstageable, DTI, NWPT, and Complex wounds) if present, place Wound referral order by RN under : No    New Ostomies, if present place, Ostomy referral order under : No     Nurse 1 eSignature: Electronically signed by Lindsey Booth RN on 2/25/25 at 4:10 AM EST    **SHARE this note so that the co-signing nurse can place an eSignature**    Nurse 2 eSignature: Electronically signed by Charissa Daugherty RN on 2/25/25 at 4:10 AM EST

## 2025-02-25 NOTE — PROGRESS NOTES
Catia, pts daughter, called for update at this time.  Update given and any questions answered at this time.

## 2025-02-25 NOTE — PLAN OF CARE
Problem: Chronic Conditions and Co-morbidities  Goal: Patient's chronic conditions and co-morbidity symptoms are monitored and maintained or improved  Outcome: Progressing  Flowsheets  Taken 2/25/2025 0410  Care Plan - Patient's Chronic Conditions and Co-Morbidity Symptoms are Monitored and Maintained or Improved:   Monitor and assess patient's chronic conditions and comorbid symptoms for stability, deterioration, or improvement   Collaborate with multidisciplinary team to address chronic and comorbid conditions and prevent exacerbation or deterioration   Update acute care plan with appropriate goals if chronic or comorbid symptoms are exacerbated and prevent overall improvement and discharge  Taken 2/24/2025 2013  Care Plan - Patient's Chronic Conditions and Co-Morbidity Symptoms are Monitored and Maintained or Improved: Monitor and assess patient's chronic conditions and comorbid symptoms for stability, deterioration, or improvement     Problem: Safety - Adult  Goal: Free from fall injury  Outcome: Progressing  Flowsheets (Taken 2/9/2025 2351 by Lois Baker, RN)  Free From Fall Injury: Instruct family/caregiver on patient safety     Problem: Pain  Goal: Verbalizes/displays adequate comfort level or baseline comfort level  Outcome: Progressing  Flowsheets (Taken 2/21/2025 0818)  Verbalizes/displays adequate comfort level or baseline comfort level: Assess pain using appropriate pain scale     Problem: Respiratory - Adult  Goal: Achieves optimal ventilation and oxygenation  Outcome: Progressing  Flowsheets  Taken 2/25/2025 0410  Achieves optimal ventilation and oxygenation:   Assess for changes in respiratory status   Position to facilitate oxygenation and minimize respiratory effort  Taken 2/24/2025 2013  Achieves optimal ventilation and oxygenation: Assess for changes in respiratory status     Problem: Skin/Tissue Integrity - Adult  Goal: Skin integrity remains intact  Description: 1.  Monitor for areas of

## 2025-02-25 NOTE — PROGRESS NOTES
Midline Insertion Procedure Note  [facility] Vascular Access Team    Sandor Early   Admitted- 1/30/2025  5:28 PM  Admission diagnosis- Hypomagnesemia [E83.42]  Influenza A [J10.1]  COPD exacerbation (HCC) [J44.1]  Respiratory failure with hypoxia (HCC) [J96.91]  Alcohol use disorder [F10.90]  Acute on chronic respiratory failure with hypoxia and hypercapnia (HCC) [J96.21, J96.22]  Acute respiratory failure (HCC) [J96.00]      Attending Physician- Drake Jalloh*  Ordering Physician- Dr. Candace Mckeon  Indication for Insertion: Limited Access    Please change all IV tubing prior to use. Arrow Midlines can stay in place up to 28 days. Flush each lumen per protocol to maximize performance of line. Midline CAN be used for blood sampling, change needleless connector after sampling and use pulsatile flush to clear blood in catheter.     Lab Results   Component Value Date    INR 1.09 02/12/2025    PROTIME 14.3 02/12/2025     Lab Results   Component Value Date    WBC 5.1 02/25/2025    HGB 7.3 (L) 02/25/2025    HCT 22.2 (L) 02/25/2025     02/25/2025     Lab Results   Component Value Date    CREATININE 0.6 (L) 02/25/2025    BUN 15 02/25/2025       Catheter Insertion Date- 2/25/2025   Catheter Brand- Arrow Antimicrobial/Antithrombogenic MIDLINE  Lot Number- [id number]   Lumen-Single    Insertion Site- Right Basilic  Vein Diameter- 0.35 cm  Maltese Size- 4.5  Catheter Length- 15 cm  Exposed Catheter Length- 0 cm   Easy insertion- Yes  Able to Aspirate Blood- Yes  Easy Flush- Yes    Midline insertion successful- Yes  Ultrasound- yes    Okay To Use Midline- \"Yes  **RN: USE NEW TUBING FOR THE NEW MIDLINE            Electronically signed by Catia Kennedy, RN, RN on 2/25/2025 at 11:44 AM

## 2025-02-25 NOTE — PROGRESS NOTES
02/24/25 2252   Patient Observation   Pulse 73   Respirations 18   SpO2 95 %   Patient Observations ETT 7.5/ 25@lips   Breath Sounds   Respiratory Pattern Regular   Right Upper Lobe Diminished   Right Middle Lobe Diminished   Right Lower Lobe Diminished   Left Upper Lobe Diminished   Left Lower Lobe Diminished   Vent Information   Vent Mode AC/VC   Ventilator Settings   FiO2  35 %   Vt (Set, mL) 500 mL   Resp Rate (Set) 22 bpm   PEEP/CPAP (cmH2O) 5   Vent Patient Data (Readings)   Vt (Measured) 560 mL   Peak Inspiratory Pressure (cmH2O) 39 cmH2O   Rate Measured 22 br/min   Minute Volume (L/min) 12.3 Liters   Peak Inspiratory Flow (lpm) 80 L/sec   Mean Airway Pressure (cmH2O) 13 cmH20   Plateau Pressure (cm H2O) 23 cm H2O   Driving Pressure 18   I:E Ratio 1:3.0   Flow Sensitivity 3 L/min   Backup Apnea On   Backup Rate 22 Breaths Per Minute   Backup Vt 500   Vent Alarm Settings   High Pressure (cmH2O) 55 cmH2O   Low Minute Volume (lpm) 4 L/min   High Minute Volume (lpm) 20 L/min   Low Exhaled Vt (ml) 250 mL   High Exhaled Vt (ml) 1000 mL   RR High (bpm) 40 br/min   Apnea (secs) 20 secs   Additional Respiratoray Assessments   Humidification Source Heated wire   Humidification Temp 35.4   Circuit Condensation Drained   Ambu Bag With Mask At Bedside Yes   Airway Clearance   Suction ET Tube   Suction Device Inline suction catheter   Sputum Method Obtained Endotracheal   Sputum Amount Small   Sputum Color/Odor White   Sputum Consistency Thick   ETT    Placement Date/Time: 02/14/25 1135   Present on Admission/Arrival: No  Placed By: RT  Placement Verified By: Auscultation;Colorimetric ETCO2 device;Chest X-ray  Preoxygenation: Yes  Airway Tube Size: 7.5 mm  Secured At: 24 cm  Measured From: Lips   Secured At 25 cm   Measured From Lips   ETT Placement Center  (moved by RT)   Secured By Commercial tube quispe   Site Assessment Dry   Cuff Pressure   (mov)   Tie/Quispe Changed No

## 2025-02-25 NOTE — PROGRESS NOTES
Progress Note    Admit Date:  1/30/2025    Interval history:  Influenza A and Acute respiratory failure   Was on bipap in iCU and improved, transferred to  west        Has NG placed for Ileus  , he pulled out NG and likely has aspirated became hypoxic and transferred to ICU      Seizure like event 2/7,   Remains confused  MRI brain incomplete .     2/13   Mentation worse today  On precedex   On 4 L of O2     2/21  Ct with resolving ileus  Started on TF @15 cc   Started weaning sedation  Repeat CT abd with no acute findings     2/22  S/p bronch yesterday.  Sedated on vent.    Subjective:  Mr. Early did not do well with spontaneous breathing trial.  Respiratory rate went up.  Placed back on assist control.  Follows commands and open eyes.  Off propofol.  On Precedex.    Objective:   Patient Vitals for the past 4 hrs:   BP Temp Temp src Pulse Resp SpO2   02/25/25 1302 (!) 130/52 -- -- 90 23 --   02/25/25 1200 (!) 128/46 99.9 °F (37.7 °C) Bladder 88 29 94 %   02/25/25 1100 (!) 128/52 -- -- 88 27 93 %   02/25/25 1000 (!) 109/43 -- -- 85 24 91 %          Intake/Output Summary (Last 24 hours) at 2/25/2025 1323  Last data filed at 2/25/2025 1257  Gross per 24 hour   Intake 2618.26 ml   Output 2955 ml   Net -336.74 ml       Physical Exam:        General:  elderly male.  Oral ETT and OG noted  Left IJ TLC . Appears to be not in any distress  Mucous Membranes:  Pink , anicteric  Neck: No JVD, no carotid bruit, no thyromegaly  Chest:  Clear to auscultation bilaterally, resolved wheeze  Cardiovascular:  RRR S1S2 heard, no murmurs or gallops  Abdomen:  Soft, obese +distended, non tender, no organomegaly, BS absent  Extremities: trace edema to ext improved   Distal pulses well felt  Neurological : Open eyes follow simple commands     cations:  thiamine, 200 mg, Daily  furosemide, 40 mg, Q12H  metoclopramide, 10 mg, Q6H  insulin lispro, 0-16 Units, Q4H  insulin glargine, 20 Units, BID  levETIRAcetam, 750 mg, BID  dilTIAZem, 60  present.      Endotracheal tube, nasogastric tube, and left jugular line.         XR CHEST PORTABLE   Final Result   Overall stable chest without significant interval change.         XR CHEST PORTABLE   Final Result   Endotracheal tube, nasogastric tube, and left jugular line are stable.  NG   side port is near the GE junction and could be advanced by 5-10 cm if desired.      Stable appearance of the lungs.         CT ABDOMEN PELVIS W IV CONTRAST Additional Contrast? None   Final Result   1. No evidence of bowel obstruction or perforation. Minimally dilated small   bowel loops likely related to mild ileus. There is improvement compared to   the previous evaluation.   2. Liver cirrhosis with mild ascites and mesenteric congestion.   3. Small bilateral pleural effusions and lower lobe atelectatic changes.   4. Punctate bilateral nephrolithiasis but no obstructive uropathy on either   side.   5. Aortobifemoral bypass graft with no obvious complications.   6. 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 Significant bilateral renal   artery stenosis near the origin.   7. Fat containing inguinal hernias without bowel involvement.   8. Mild fat containing periumbilical hernia.   9. Mild anasarca.         XR CHEST PORTABLE   Final Result   No significant interval change.         XR CHEST PORTABLE   Final Result   1.  Lines and tubes are stable.      2.  Coarse interstitial markings and interstitial opacities in the lower   lungs.  Correlate for bronchiolitis or atypical infection.         XR CHEST PORTABLE   Final Result   1. No significant change.         XR CHEST PORTABLE   Final Result   Endotracheal tube, nasogastric tube, and left jugular line are stable.  NG   tube could be advanced by 5-10 cm for better positioning.      Bronchial thickening and peribronchial opacities with suspicion for   bronchiolitis..         XR CHEST PORTABLE   Final Result   Support tubes and lines as described above.      Atelectasis or

## 2025-02-25 NOTE — PROGRESS NOTES
Shift assessment completed, see flow sheet.   RASS -1. Does not follow commands. He does make eye contact. Sedation by continuous IV infusion of Precedex and titrated for RASS goal -1 to 0    Intubated and sedated on AC # 7.5 ETT, at 25 cm LL. 22 / 500 / 35 %/ +5.   SpO2 94%. Respirations are easy, even, and unlabored.   Bilateral lung sounds diminished throughout.     VSS  OG in place at 60 cm LL, with TF at 40 mL/hr and water at 30 m/ q3h, 55 ml residual.      CVC, WNL with CHG caps on open ports. Line secured in place and dressing CDI.    All lines and monitoring devices in place. Castrejon is patent and secured with no dependent loops and secured off floor to moveable part of bed    Bilateral soft wrist restraints in place for patient safety of pulling at lines and equipment d/t use of continuous IV sedation medication.    Bed in lowest position with wheels locked. No needs identified at this time. Will continue to monitor.     Electronically signed by Lindsey Booth RN on 2/24/2025 at 10:57 PM

## 2025-02-25 NOTE — PROGRESS NOTES
RT Inhaler-Nebulizer Bronchodilator Protocol Note    There is a bronchodilator order in the chart from a provider indicating to follow the RT Bronchodilator Protocol and there is an “Initiate RT Inhaler-Nebulizer Bronchodilator Protocol” order as well (see protocol at bottom of note).    CXR Findings:  XR CHEST PORTABLE    Result Date: 2/24/2025  1.  Lines and tubes as above.  The NG side port is near the GE junction and could be advanced by 5-10 cm. 2.  Interstitial opacities in both lower lungs are redemonstrated.     XR CHEST PORTABLE    Result Date: 2/23/2025  Bibasilar opacities are redemonstrated. Endotracheal tube, nasogastric tube, and left jugular line are stable.       The findings from the last RT Protocol Assessment were as follows:   History Pulmonary Disease: (P) Chronic pulmonary disease  Respiratory Pattern: (P) Dyspnea on exertion or RR 21-25 bpm  Breath Sounds: (P) Slightly diminished and/or crackles  Cough: (P) Strong, productive  Indication for Bronchodilator Therapy: (P) Decreased or absent breath sounds  Bronchodilator Assessment Score: (P) 7    Aerosolized bronchodilator medication orders have been revised according to the RT Inhaler-Nebulizer Bronchodilator Protocol below.    Respiratory Therapist to perform RT Therapy Protocol Assessment initially then follow the protocol.  Repeat RT Therapy Protocol Assessment PRN for score 0-3 or on second treatment, BID, and PRN for scores above 3.    No Indications - adjust the frequency to every 6 hours PRN wheezing or bronchospasm, if no treatments needed after 48 hours then discontinue using Per Protocol order mode.     If indication present, adjust the RT bronchodilator orders based on the Bronchodilator Assessment Score as indicated below.  Use Inhaler orders unless patient has one or more of the following: on home nebulizer, not able to hold breath for 10 seconds, is not alert and oriented, cannot activate and use MDI correctly, or respiratory rate

## 2025-02-25 NOTE — PROGRESS NOTES
Report to CONSTANTINO Izaguirre. Reviewed any changes in patient, current infusions and POC.    Electronically signed by Lindsey Booth RN on 2/25/2025 at 7:13 AM

## 2025-02-25 NOTE — PROGRESS NOTES
02/24/25 2011   Patient Observation   Pulse 80   Respirations 22   SpO2 94 %   Patient Observations ETT 7.5/ 25@lips   Breath Sounds   Respiratory Pattern Regular   Right Upper Lobe Diminished   Right Middle Lobe Diminished   Right Lower Lobe Diminished   Left Upper Lobe Diminished   Left Lower Lobe Diminished   Vent Information   Vent Mode AC/VC   Ventilator Settings   FiO2  35 %   Vt (Set, mL) 500 mL   Resp Rate (Set) 22 bpm   PEEP/CPAP (cmH2O) 5   Vent Patient Data (Readings)   Vt (Measured) 581 mL   Peak Inspiratory Pressure (cmH2O) 28 cmH2O   Rate Measured 24 br/min   Minute Volume (L/min) 13.5 Liters   Peak Inspiratory Flow (lpm) 80 L/sec   Mean Airway Pressure (cmH2O) 12 cmH20   Plateau Pressure (cm H2O) 23 cm H2O   Driving Pressure 18   I:E Ratio 1:2.6   Flow Sensitivity 3 L/min   Backup Apnea On   Backup Rate 22 Breaths Per Minute   Backup Vt 500   Vent Alarm Settings   High Pressure (cmH2O) 55 cmH2O   Low Minute Volume (lpm) 4 L/min   High Minute Volume (lpm) 20 L/min   Low Exhaled Vt (ml) 250 mL   High Exhaled Vt (ml) 1000 mL   RR High (bpm) 40 br/min   Apnea (secs) 20 secs   Additional Respiratoray Assessments   Humidification Source Heated wire   Humidification Temp 37.3   Circuit Condensation Drained   Ambu Bag With Mask At Bedside Yes   Airway Clearance   Suction ET Tube   Subglottic Suction Done Yes   Suction Device Inline suction catheter   Sputum Method Obtained Endotracheal   Sputum Amount Moderate   Sputum Color/Odor White   Sputum Consistency Thick   ETT    Placement Date/Time: 02/14/25 1135   Present on Admission/Arrival: No  Placed By: RT  Placement Verified By: Auscultation;Colorimetric ETCO2 device;Chest X-ray  Preoxygenation: Yes  Airway Tube Size: 7.5 mm  Secured At: 24 cm  Measured From: Lips   Secured At 25 cm   Measured From Lips   ETT Placement Right  (moved by RT from left)   Secured By Commercial tube quispe   Site Assessment Dry   Cuff Pressure   (mov)   Tie/Quispe Changed No

## 2025-02-25 NOTE — PROGRESS NOTES
Care rounds completed with Dr. Romero at this time.  Per MD, ok to get remove CVC if PIVs can be established.  Vent settings changed to  26 / 400 / 35% / 5.  Order to get ABG at noon.      Also spoke with MD regarding the need for chavira catheter.  MD placed order to increase lasix and would like to continue chavira for accurate I/Os.

## 2025-02-25 NOTE — PLAN OF CARE
Problem: Chronic Conditions and Co-morbidities  Goal: Patient's chronic conditions and co-morbidity symptoms are monitored and maintained or improved  2/25/2025 1126 by Ana Arechiga RN  Outcome: Progressing  2/25/2025 0410 by Lindsey Booth RN  Outcome: Progressing  Flowsheets  Taken 2/25/2025 0410  Care Plan - Patient's Chronic Conditions and Co-Morbidity Symptoms are Monitored and Maintained or Improved:   Monitor and assess patient's chronic conditions and comorbid symptoms for stability, deterioration, or improvement   Collaborate with multidisciplinary team to address chronic and comorbid conditions and prevent exacerbation or deterioration   Update acute care plan with appropriate goals if chronic or comorbid symptoms are exacerbated and prevent overall improvement and discharge  Taken 2/24/2025 2013  Care Plan - Patient's Chronic Conditions and Co-Morbidity Symptoms are Monitored and Maintained or Improved: Monitor and assess patient's chronic conditions and comorbid symptoms for stability, deterioration, or improvement     Problem: Discharge Planning  Goal: Discharge to home or other facility with appropriate resources  Outcome: Progressing     Problem: Safety - Adult  Goal: Free from fall injury  2/25/2025 1126 by Ana Arechiga RN  Outcome: Progressing  2/25/2025 0410 by Lindsey Booth RN  Outcome: Progressing  Flowsheets (Taken 2/9/2025 2351 by Lois Baker, RN)  Free From Fall Injury: Instruct family/caregiver on patient safety     Problem: Safety - Adult  Goal: Free from fall injury  2/25/2025 1126 by Ana Arechiga RN  Outcome: Progressing  2/25/2025 0410 by Lindsey Booth RN  Outcome: Progressing  Flowsheets (Taken 2/9/2025 2351 by Lois Baker, RN)  Free From Fall Injury: Instruct family/caregiver on patient safety     Problem: Pain  Goal: Verbalizes/displays adequate comfort level or baseline comfort level  2/25/2025 0410 by Lindsey Booth RN  Outcome: Progressing  Flowsheets

## 2025-02-25 NOTE — PROGRESS NOTES
Patient report given to CONSTANTINO Portillo. No acute changes in assessment noted during shift. Care transferred.

## 2025-02-25 NOTE — PROGRESS NOTES
Pt intubated and sedated.    Intubated with # 7.5 at the # 25 LL.   Vent settings are 22 / 500 / 35% / 5.    Breath sounds are diminished with rhonchi noted throughout.  Large amount of clear oral secretions noted.     Heart rhythm is normal sinus on the bedside monitor with rates in the 89-90s.   Pt generally swollen, especially BUE and penis/scrotum areas.   BUE edema appears better  than yesterday, no change in penial edema.    Scheduled meds given per orders.    NG at 60 cm. TF running at goal rate of 40 ml/hr. Pt tolerating well. Residual of   20 mls noted.     Left IJ CVC patent and working well.   Precedex infusing.   No need for CVC, will have midline placed and remove CVC.     RASS -1  CPOT 0    Pt starting to track staff, follow commands, and nod head to some questions.     Chavira patent and draining clear yellow urine. Will talk to MD regarding continued need for chavira.

## 2025-02-26 ENCOUNTER — APPOINTMENT (OUTPATIENT)
Dept: GENERAL RADIOLOGY | Age: 70
DRG: 207 | End: 2025-02-26
Payer: MEDICARE

## 2025-02-26 ENCOUNTER — HOSPITAL ENCOUNTER (OUTPATIENT)
Age: 70
Discharge: HOME | End: 2025-02-26
Attending: INTERNAL MEDICINE

## 2025-02-26 LAB
ALBUMIN SERPL-MCNC: 1.9 G/DL (ref 3.4–5)
ALP SERPL-CCNC: 287 U/L (ref 40–129)
ALT SERPL-CCNC: 33 U/L (ref 10–40)
ANION GAP SERPL CALCULATED.3IONS-SCNC: 5 MMOL/L (ref 3–16)
AST SERPL-CCNC: 36 U/L (ref 15–37)
BASE EXCESS BLDA CALC-SCNC: 11.1 MMOL/L (ref -3–3)
BASOPHILS # BLD: 0 K/UL (ref 0–0.2)
BASOPHILS NFR BLD: 0.8 %
BILIRUB DIRECT SERPL-MCNC: <0.1 MG/DL (ref 0–0.3)
BILIRUB INDIRECT SERPL-MCNC: ABNORMAL MG/DL (ref 0–1)
BILIRUB SERPL-MCNC: <0.2 MG/DL (ref 0–1)
BUN SERPL-MCNC: 16 MG/DL (ref 7–20)
CALCIUM SERPL-MCNC: 8.1 MG/DL (ref 8.3–10.6)
CHLORIDE SERPL-SCNC: 96 MMOL/L (ref 99–110)
CO2 BLDA-SCNC: 37.4 MMOL/L
CO2 SERPL-SCNC: 38 MMOL/L (ref 21–32)
COHGB MFR BLDA: 0.8 % (ref 0–1.5)
CREAT SERPL-MCNC: 0.7 MG/DL (ref 0.8–1.3)
DEPRECATED RDW RBC AUTO: 16.8 % (ref 12.4–15.4)
EOSINOPHIL # BLD: 0.1 K/UL (ref 0–0.6)
EOSINOPHIL NFR BLD: 1.7 %
GFR SERPLBLD CREATININE-BSD FMLA CKD-EPI: >90 ML/MIN/{1.73_M2}
GLUCOSE BLD-MCNC: 122 MG/DL (ref 70–99)
GLUCOSE BLD-MCNC: 127 MG/DL (ref 70–99)
GLUCOSE BLD-MCNC: 148 MG/DL (ref 70–99)
GLUCOSE SERPL-MCNC: 112 MG/DL (ref 70–99)
HCO3 BLDA-SCNC: 35.9 MMOL/L (ref 21–29)
HCT VFR BLD AUTO: 23.1 % (ref 40.5–52.5)
HGB BLD-MCNC: 7.6 G/DL (ref 13.5–17.5)
HGB BLDA-MCNC: 8.3 G/DL (ref 13.5–17.5)
LYMPHOCYTES # BLD: 1.1 K/UL (ref 1–5.1)
LYMPHOCYTES NFR BLD: 19.9 %
MAGNESIUM SERPL-MCNC: 1.8 MG/DL (ref 1.8–2.4)
MCH RBC QN AUTO: 30 PG (ref 26–34)
MCHC RBC AUTO-ENTMCNC: 33.1 G/DL (ref 31–36)
MCV RBC AUTO: 90.7 FL (ref 80–100)
METHGB MFR BLDA: 0.3 %
MONOCYTES # BLD: 0.7 K/UL (ref 0–1.3)
MONOCYTES NFR BLD: 12.4 %
NEUTROPHILS # BLD: 3.7 K/UL (ref 1.7–7.7)
NEUTROPHILS NFR BLD: 65.2 %
O2 THERAPY: ABNORMAL
PCO2 BLDA: 49.9 MMHG (ref 35–45)
PERFORMED ON: ABNORMAL
PH BLDA: 7.47 [PH] (ref 7.35–7.45)
PHOSPHATE SERPL-MCNC: 4 MG/DL (ref 2.5–4.9)
PLATELET # BLD AUTO: 367 K/UL (ref 135–450)
PMV BLD AUTO: 8 FL (ref 5–10.5)
PO2 BLDA: 61.1 MMHG (ref 75–108)
POTASSIUM SERPL-SCNC: 3.6 MMOL/L (ref 3.5–5.1)
PROT SERPL-MCNC: 7.4 G/DL (ref 6.4–8.2)
RBC # BLD AUTO: 2.54 M/UL (ref 4.2–5.9)
SAO2 % BLDA: 92.5 %
SODIUM SERPL-SCNC: 139 MMOL/L (ref 136–145)
WBC # BLD AUTO: 5.7 K/UL (ref 4–11)

## 2025-02-26 PROCEDURE — 94003 VENT MGMT INPAT SUBQ DAY: CPT

## 2025-02-26 PROCEDURE — 6360000002 HC RX W HCPCS: Performed by: INTERNAL MEDICINE

## 2025-02-26 PROCEDURE — 6370000000 HC RX 637 (ALT 250 FOR IP): Performed by: INTERNAL MEDICINE

## 2025-02-26 PROCEDURE — 99232 SBSQ HOSP IP/OBS MODERATE 35: CPT | Performed by: INTERNAL MEDICINE

## 2025-02-26 PROCEDURE — 6360000002 HC RX W HCPCS: Performed by: STUDENT IN AN ORGANIZED HEALTH CARE EDUCATION/TRAINING PROGRAM

## 2025-02-26 PROCEDURE — 94761 N-INVAS EAR/PLS OXIMETRY MLT: CPT

## 2025-02-26 PROCEDURE — 71045 X-RAY EXAM CHEST 1 VIEW: CPT

## 2025-02-26 PROCEDURE — 85025 COMPLETE CBC W/AUTO DIFF WBC: CPT

## 2025-02-26 PROCEDURE — 82803 BLOOD GASES ANY COMBINATION: CPT

## 2025-02-26 PROCEDURE — 2700000000 HC OXYGEN THERAPY PER DAY

## 2025-02-26 PROCEDURE — 6360000002 HC RX W HCPCS

## 2025-02-26 PROCEDURE — 84100 ASSAY OF PHOSPHORUS: CPT

## 2025-02-26 PROCEDURE — 2000000000 HC ICU R&B

## 2025-02-26 PROCEDURE — 2500000003 HC RX 250 WO HCPCS: Performed by: INTERNAL MEDICINE

## 2025-02-26 PROCEDURE — 99291 CRITICAL CARE FIRST HOUR: CPT | Performed by: INTERNAL MEDICINE

## 2025-02-26 PROCEDURE — 80048 BASIC METABOLIC PNL TOTAL CA: CPT

## 2025-02-26 PROCEDURE — 83735 ASSAY OF MAGNESIUM: CPT

## 2025-02-26 PROCEDURE — 94640 AIRWAY INHALATION TREATMENT: CPT

## 2025-02-26 PROCEDURE — 80076 HEPATIC FUNCTION PANEL: CPT

## 2025-02-26 RX ORDER — CARBOXYMETHYLCELLULOSE SODIUM 10 MG/ML
1 GEL OPHTHALMIC PRN
Status: DISCONTINUED | OUTPATIENT
Start: 2025-02-26 | End: 2025-03-25 | Stop reason: HOSPADM

## 2025-02-26 RX ORDER — LEVETIRACETAM 100 MG/ML
750 SOLUTION ORAL 2 TIMES DAILY
Status: DISCONTINUED | OUTPATIENT
Start: 2025-02-26 | End: 2025-03-04

## 2025-02-26 RX ADMIN — INSULIN GLARGINE 20 UNITS: 100 INJECTION, SOLUTION SUBCUTANEOUS at 08:21

## 2025-02-26 RX ADMIN — ALBUTEROL SULFATE 2.5 MG: 2.5 SOLUTION RESPIRATORY (INHALATION) at 19:27

## 2025-02-26 RX ADMIN — METOPROLOL TARTRATE 50 MG: 50 TABLET, FILM COATED ORAL at 08:21

## 2025-02-26 RX ADMIN — FUROSEMIDE 40 MG: 10 INJECTION, SOLUTION INTRAMUSCULAR; INTRAVENOUS at 20:56

## 2025-02-26 RX ADMIN — ENOXAPARIN SODIUM 40 MG: 100 INJECTION SUBCUTANEOUS at 08:21

## 2025-02-26 RX ADMIN — Medication 200 MG: at 08:20

## 2025-02-26 RX ADMIN — CARBOXYMETHYLCELLULOSE SODIUM 1 DROP: 10 GEL OPHTHALMIC at 19:19

## 2025-02-26 RX ADMIN — METOCLOPRAMIDE 10 MG: 5 INJECTION, SOLUTION INTRAMUSCULAR; INTRAVENOUS at 08:20

## 2025-02-26 RX ADMIN — DILTIAZEM HYDROCHLORIDE 60 MG: 60 TABLET ORAL at 13:50

## 2025-02-26 RX ADMIN — GABAPENTIN 200 MG: 250 SOLUTION ORAL at 13:50

## 2025-02-26 RX ADMIN — ALBUTEROL SULFATE 2.5 MG: 2.5 SOLUTION RESPIRATORY (INHALATION) at 15:20

## 2025-02-26 RX ADMIN — ALBUTEROL SULFATE 2.5 MG: 2.5 SOLUTION RESPIRATORY (INHALATION) at 11:23

## 2025-02-26 RX ADMIN — DILTIAZEM HYDROCHLORIDE 60 MG: 60 TABLET ORAL at 05:16

## 2025-02-26 RX ADMIN — Medication 5000 UNITS: at 08:20

## 2025-02-26 RX ADMIN — DEXMEDETOMIDINE HYDROCHLORIDE 0.8 MCG/KG/HR: 4 INJECTION, SOLUTION INTRAVENOUS at 05:16

## 2025-02-26 RX ADMIN — DEXMEDETOMIDINE HYDROCHLORIDE 0.5 MCG/KG/HR: 4 INJECTION, SOLUTION INTRAVENOUS at 19:37

## 2025-02-26 RX ADMIN — GABAPENTIN 200 MG: 250 SOLUTION ORAL at 20:56

## 2025-02-26 RX ADMIN — SODIUM CHLORIDE, PRESERVATIVE FREE 10 ML: 5 INJECTION INTRAVENOUS at 20:57

## 2025-02-26 RX ADMIN — METOCLOPRAMIDE 10 MG: 5 INJECTION, SOLUTION INTRAMUSCULAR; INTRAVENOUS at 20:56

## 2025-02-26 RX ADMIN — METOCLOPRAMIDE 10 MG: 5 INJECTION, SOLUTION INTRAMUSCULAR; INTRAVENOUS at 05:16

## 2025-02-26 RX ADMIN — LEVETIRACETAM 750 MG: 100 INJECTION INTRAVENOUS at 08:21

## 2025-02-26 RX ADMIN — SODIUM CHLORIDE, PRESERVATIVE FREE 10 ML: 5 INJECTION INTRAVENOUS at 00:06

## 2025-02-26 RX ADMIN — DILTIAZEM HYDROCHLORIDE 60 MG: 60 TABLET ORAL at 20:56

## 2025-02-26 RX ADMIN — ALLOPURINOL 300 MG: 300 TABLET ORAL at 08:20

## 2025-02-26 RX ADMIN — METOPROLOL TARTRATE 50 MG: 50 TABLET, FILM COATED ORAL at 20:56

## 2025-02-26 RX ADMIN — PANTOPRAZOLE SODIUM 40 MG: 40 INJECTION, POWDER, LYOPHILIZED, FOR SOLUTION INTRAVENOUS at 08:20

## 2025-02-26 RX ADMIN — GABAPENTIN 200 MG: 250 SOLUTION ORAL at 08:21

## 2025-02-26 RX ADMIN — FUROSEMIDE 40 MG: 10 INJECTION, SOLUTION INTRAMUSCULAR; INTRAVENOUS at 08:20

## 2025-02-26 RX ADMIN — ALBUTEROL SULFATE 2.5 MG: 2.5 SOLUTION RESPIRATORY (INHALATION) at 08:17

## 2025-02-26 RX ADMIN — GABAPENTIN 200 MG: 250 SOLUTION ORAL at 16:53

## 2025-02-26 RX ADMIN — METOCLOPRAMIDE 10 MG: 5 INJECTION, SOLUTION INTRAMUSCULAR; INTRAVENOUS at 13:50

## 2025-02-26 RX ADMIN — Medication 750 MG: at 21:01

## 2025-02-26 RX ADMIN — INSULIN GLARGINE 20 UNITS: 100 INJECTION, SOLUTION SUBCUTANEOUS at 20:56

## 2025-02-26 RX ADMIN — DEXMEDETOMIDINE HYDROCHLORIDE 0.6 MCG/KG/HR: 4 INJECTION, SOLUTION INTRAVENOUS at 10:38

## 2025-02-26 RX ADMIN — ATORVASTATIN CALCIUM 40 MG: 40 TABLET, FILM COATED ORAL at 08:20

## 2025-02-26 RX ADMIN — POTASSIUM BICARBONATE 40 MEQ: 782 TABLET, EFFERVESCENT ORAL at 11:31

## 2025-02-26 ASSESSMENT — PULMONARY FUNCTION TESTS
PIF_VALUE: 34
PIF_VALUE: 31
PIF_VALUE: 32
PIF_VALUE: 35
PIF_VALUE: 35
PIF_VALUE: 16
PIF_VALUE: 29
PIF_VALUE: 31
PIF_VALUE: 41
PIF_VALUE: 32
PIF_VALUE: 34

## 2025-02-26 NOTE — PROGRESS NOTES
Reassessment complete.    Patient continues intubated and sedated with unchanged physical assessment. Patient repositioned for comfort. Bilateral soft wrist restraints in place for patient safety and the safety of lines and tubes.    Precedex infusing at 0.5 mcg/kg/h.

## 2025-02-26 NOTE — PROGRESS NOTES
02/1955   Patient Observation   Pulse 84   Respirations 26   SpO2 95 %   Patient Observations ETT 7.5/ 25@lips   Breath Sounds   Respiratory Pattern Regular   Right Upper Lobe Diminished;Rhonchi   Right Middle Lobe Diminished;Rhonchi   Right Lower Lobe Diminished;Rhonchi   Left Upper Lobe Diminished;Rhonchi   Left Lower Lobe Diminished;Rhonchi   Vent Information   Equipment Changed Humidification   Vent Mode AC/VC   Ventilator Settings   FiO2  35 %   Vt (Set, mL) 400 mL   Resp Rate (Set) 22 bpm   PEEP/CPAP (cmH2O) 5   Vent Patient Data (Readings)   Vt (Measured) 463 mL   Peak Inspiratory Pressure (cmH2O) 25 cmH2O   Rate Measured 26 br/min   Minute Volume (L/min) 11.7 Liters   Peak Inspiratory Flow (lpm) 80 L/sec   Mean Airway Pressure (cmH2O) 9.6 cmH20   Plateau Pressure (cm H2O) 25 cm H2O   Driving Pressure 20   I:E Ratio 1:3.4   Flow Sensitivity 3 L/min   Backup Apnea On   Backup Rate 22 Breaths Per Minute   Backup Vt 500   Vent Alarm Settings   High Pressure (cmH2O) 55 cmH2O   Low Minute Volume (lpm) 4 L/min   High Minute Volume (lpm) 20 L/min   Low Exhaled Vt (ml) 250 mL   High Exhaled Vt (ml) 1000 mL   RR High (bpm) 40 br/min   Apnea (secs) 20 secs   Additional Respiratoray Assessments   Humidification Source Heated wire   Humidification Temp 36.7   Circuit Condensation Drained   Ambu Bag With Mask At Bedside Yes   Airway Clearance   Suction ET Tube   Subglottic Suction Done Yes   Suction Device Inline suction catheter   Sputum Method Obtained Endotracheal   Sputum Amount Moderate   Sputum Color/Odor White   Sputum Consistency Thick   ETT    Placement Date/Time: 02/14/25 1135   Present on Admission/Arrival: No  Placed By: RT  Placement Verified By: Auscultation;Colorimetric ETCO2 device;Chest X-ray  Preoxygenation: Yes  Airway Tube Size: 7.5 mm  Secured At: 24 cm  Measured From: Lips   Secured At 25 cm   Measured From Lips   ETT Placement Center  (moved by RT)   Secured By Commercial tube may   Site

## 2025-02-26 NOTE — PLAN OF CARE
Problem: Chronic Conditions and Co-morbidities  Goal: Patient's chronic conditions and co-morbidity symptoms are monitored and maintained or improved  Outcome: Progressing     Problem: Discharge Planning  Goal: Discharge to home or other facility with appropriate resources  2/26/2025 1220 by Cyndie Sullivan RN  Outcome: Progressing  2/26/2025 0240 by Lindsey Booth RN  Outcome: Progressing  Flowsheets (Taken 2/24/2025 2013)  Discharge to home or other facility with appropriate resources:   Identify barriers to discharge with patient and caregiver   Arrange for needed discharge resources and transportation as appropriate     Problem: Safety - Adult  Goal: Free from fall injury  2/26/2025 1220 by Cyndie Sullivan RN  Outcome: Progressing  2/26/2025 0240 by Lindsey Booth RN  Outcome: Progressing  Note: Monitor for signs of skin breakdown     Problem: Pain  Goal: Verbalizes/displays adequate comfort level or baseline comfort level  2/26/2025 1220 by Cyndie Sullivan RN  Outcome: Progressing  2/26/2025 0240 by Lindsey Booth RN  Outcome: Progressing  Flowsheets (Taken 2/26/2025 0240)  Verbalizes/displays adequate comfort level or baseline comfort level: Assess pain using appropriate pain scale     Problem: Respiratory - Adult  Goal: Achieves optimal ventilation and oxygenation  2/26/2025 1220 by Cyndie Sullivan RN  Outcome: Progressing  2/26/2025 0240 by Lindsey Booth RN  Outcome: Progressing  Flowsheets (Taken 2/26/2025 0240)  Achieves optimal ventilation and oxygenation:   Assess for changes in respiratory status   Position to facilitate oxygenation and minimize respiratory effort     Problem: Cardiovascular - Adult  Goal: Maintains optimal cardiac output and hemodynamic stability  Outcome: Progressing  Goal: Absence of cardiac dysrhythmias or at baseline  Outcome: Progressing     Problem: Skin/Tissue Integrity - Adult  Goal: Skin integrity remains intact  Description: 1.  Monitor for areas of redness and/or skin

## 2025-02-26 NOTE — PROGRESS NOTES
Shift assessment complete.    Patient seen intubated and sedated. RASS - 1, patient with eye opening and tracking to new voice. Patient following commands with right hand AEB squeezing this RN's hand on command, patient is not moving other extremities to command at this time. Patient intubated with a # 7.5 ETT at the 25 LL. Ventilator settings are currently AC 22/400/50/+5. Patient with no s/s of pain noted. Patient with no s/s of immediate distress.    Physical assessment as charted in flow sheets.    Scheduled medications given per orders.    Precedex infusing at 0.7 mcg/kg/h.     Central line dressing is clean, dry and intact with no signs of drainage. All tubing is current and dated. IPA caps applied to all access hubs.     NG intact and secure, tube feed running at goal rate of 40 mL/hr with 30 q3h water flushes.    Castrejon intact and secure, clear yellow urine draining.    Patient repositioned for comfort. Bilateral soft wrist restraints in place for patient safety and the safety of all lines and tubes.

## 2025-02-26 NOTE — PROGRESS NOTES
Patient Spo2 86%. Update to RT Ramesh. Decision to increase Fio2 to 50% from 35%. Changes made. Update to CONSTANTINO Agee.    Electronically signed by Lindsey Booth RN on 2/26/2025 at 7:26 AM

## 2025-02-26 NOTE — PROGRESS NOTES
02/25/25 2332   Patient Observation   Pulse 85   Respirations 21   SpO2 (!) 84 %   Breath Sounds   Breath Sounds Bilateral Rhonchi   Right Upper Lobe Rhonchi   Right Middle Lobe Rhonchi   Right Lower Lobe Rhonchi   Left Upper Lobe Rhonchi   Left Lower Lobe Rhonchi   Vent Information   Vent Mode AC/VC   Ventilator Settings   FiO2  35 %   Vt (Set, mL) 400 mL   Resp Rate (Set) 22 bpm   PEEP/CPAP (cmH2O) 5   Vent Patient Data (Readings)   Vt (Measured) 647 mL   Peak Inspiratory Pressure (cmH2O) 36 cmH2O   Rate Measured 29 br/min   Minute Volume (L/min) 12.6 Liters   Mean Airway Pressure (cmH2O) 13 cmH20   Plateau Pressure (cm H2O) 25 cm H2O   Driving Pressure 20   I:E Ratio 1:3.40   Backup Apnea On   Backup Rate 22 Breaths Per Minute   Backup Vt 500   Vent Alarm Settings   High Pressure (cmH2O) 55 cmH2O   Low Minute Volume (lpm) 4 L/min   High Minute Volume (lpm) 20 L/min   Low Exhaled Vt (ml) 250 mL   High Exhaled Vt (ml) 1000 mL   RR High (bpm) 40 br/min   Apnea (secs) 20 secs   Additional Respiratoray Assessments   Humidification Source Heated wire   Humidification Temp 37   Circuit Condensation Drained   Ambu Bag With Mask At Bedside Yes   Airway Clearance   Suction ET Tube   Subglottic Suction Done Yes   Suction Device Inline suction catheter   Suction Catheter Size 14 Fr   Sputum Method Obtained Endotracheal   Sputum Amount Large   Sputum Color/Odor White   Sputum Consistency Thick   ETT    Placement Date/Time: 02/14/25 1135   Present on Admission/Arrival: No  Placed By: RT  Placement Verified By: Auscultation;Colorimetric ETCO2 device;Chest X-ray  Preoxygenation: Yes  Airway Tube Size: 7.5 mm  Secured At: 24 cm  Measured From: Lips   Secured At 25 cm   Measured From Lips   ETT Placement Left   Secured By Commercial tube quispe   Site Assessment Dry   Tie/Quispe Changed No   Ventilator Associated Pneumonia Bundle   Elevation of Head of Bed to 30-45 Degrees  Yes   Oral Care Completed No   Oral Suctioning No

## 2025-02-26 NOTE — ACP (ADVANCE CARE PLANNING)
[facility]  Palliative Medicine Consultation Note      Date Of Admission:1/30/2025  Date of consult: 02/26/25  Seen by PC in the past:  No    Recommendations:      Introduced palliative care and had a voluntary discussion about advance care planning. Palliative care consultation ordered for a goals of care discussion and for family support. Patient is sedated and unable to participate in discussion. Call placed to the patient's daughter, Catia for a goals of care discussion. Catia states that the patient lives with her step-brother and his family. Catia and family have discussed having a trach placed and feel that it would be the best course of action for the patient when the times comes.  Discussed the [facility] program and she would like for them to follow along in the patient's care stating that she feels that it would be beneficial.     1. Goals of Care/Advanced Care planning/Code status: Full  2. Pain: UTD  3. SOB: Intubated  4. Disposition: Most likely SNF upon discharge, HTP to follow    Reason for Consult:         [x]  Goals of Care  []  Code Status Discussion/Advanced Care Planning   []  Psychosocial/Family Support  []  Symptom Management  [x]  Other (Specify) Family support    Requesting Physician: Dr. Mckeon    CHIEF COMPLAINT:  Shortness of breath    History Obtained From:  EMR, Daughter-Catia    History of Present Illness:         Sandor Early is a 69 y.o. male with PMH of COPD, urine cancer, seizures, DM, HTN, gastroesophageal reflux disease, hyperlipidemia, alcoholic liver cirrhosis  who [place] [facility] [place] breath. Patient was in severe respiratory distress and received steroids, DuoNebs, was started on BiPAP 16/8, given morphine and was transferred to ICU for further management. Patient had to be intubated on 2/14/2025 and is still currently intubated. Patient has failed several trail weans and will not undergo a trial today due to ABG's not

## 2025-02-26 NOTE — PROGRESS NOTES
Informed that patient becoming agitated and coughing against the ventilator.     Another nurse medicated patient with 2 mg Versed IVP.    After about 5 minutes patient was more calm, tolerating the ventilator and resting more quietly.    Electronically signed by Lindsey Booth RN on 2/26/2025 at 12:10 AM

## 2025-02-26 NOTE — PLAN OF CARE
Patient is not able to demonstrate the ability to move from a reclining position to an upright position within the recliner due to being sedated and intubated.    4 Eyes Skin Assessment     NAME:  Sandor Early  YOB: 1955  MEDICAL RECORD NUMBER:  [id number]    The patient is being assessed for  Shift Handoff    I agree that at least one RN has performed a thorough Head to Toe Skin Assessment on the patient. ALL assessment sites listed below have been assessed.      Areas assessed by both nurses:    Head, Face, Ears, Shoulders, Back, Chest, Arms, Elbows, Hands, Sacrum. Buttock, Coccyx, Ischium, Legs. Feet and Heels, and Under Medical Devices         Does the Patient have a Wound? No noted wound(s)       Abraham Prevention initiated by RN: Yes  Wound Care Orders initiated by RN: No    Pressure Injury (Stage 3,4, Unstageable, DTI, NWPT, and Complex wounds) if present, place Wound referral order by RN under : No    New Ostomies, if present place, Ostomy referral order under : No     Nurse 1 eSignature: Electronically signed by Lindsey Booth RN on 2/26/25 at 6:26 AM EST    **SHARE this note so that the co-signing nurse can place an eSignature**    Nurse 2 eSignature: Electronically signed by Claire Salazar RN on 2/26/25 at 6:27 AM EST

## 2025-02-26 NOTE — PROGRESS NOTES
Pulmonary & Critical Care Medicine ICU Progress Note    CC: COPD respiratory failure    Events of Last 24 hours:   TPN started -    Propofol off since yesterday   Precedex 0.7 mcg/kg/hr   PEEP 5  FiO2 50%  More awake         Vascular lines: IV: Left IJ -. R Midline     MV:   Vent Mode: AC/VC Resp Rate (Set): 22 bpm/Vt (Set, mL): 400 mL/ /FiO2 : 35 %  Recent Labs     25  1220 25  0510   PHART 7.473* 7.475*   FCJ5NUS 44.7 49.9*   PO2ART 65.8* 61.1*       IV:   sodium chloride      sodium chloride      dexmedeTOMIDine HCl in NaCl 0.8 mcg/kg/hr (25 0542)    propofol Stopped (25 0910)    sodium chloride      dextrose         Vitals:  Blood pressure (!) 143/62, pulse 99, temperature 100.4 °F (38 °C), temperature source Bladder, resp. rate 24, height 1.702 m (5' 7.01\"), weight 95.2 kg (209 lb 14.1 oz), SpO2 93%.  on   Temp  Av.8 °F (37.7 °C)  Min: 99.3 °F (37.4 °C)  Max: 100.4 °F (38 °C)    Intake/Output Summary (Last 24 hours) at 2025 0723  Last data filed at 2025 0542  Gross per 24 hour   Intake 1872.91 ml   Output 4590 ml   Net -2717.09 ml     EXAM:  General: ill appearing.  Intubated sedated.  Eyes: No sclera icterus. No conjunctival injection.  ENT: No discharge. ET tube in place  Neck: Trachea midline.   Resp: No accessory muscle use. Few crackles. No wheezing. + rhonchi.   CV: Regular  rate. Regular rhythm. No mumur or rub. 1-2 LE edema.   GI: Non-tender. Non-distended.   Skin: Warm and dry. No rash on exposed extremities.  M/S: No cyanosis. No clubbing.   Neuro: Intubated sedated.  + following commands. Responsive to painful stimuli  Psych: Noncommunicative unable to obtain        Scheduled Meds:   thiamine  200 mg Oral Daily    furosemide  40 mg IntraVENous Q12H    sodium chloride flush  5-40 mL IntraVENous 2 times per day    lidocaine 1 % injection  50 mg IntraDERmal Once    metoclopramide  10 mg IntraVENous Q6H    insulin lispro  0-16 Units

## 2025-02-26 NOTE — PROGRESS NOTES
Reassessment complete.    Patient continues intubated and sedated. RASS - 1. Precedex gtt decreased. Physical assessment is unchanged at this time. Patient repositioned for comfort. Bilateral soft wrist restraints remain in place for patient safety.    Precedex infusing at 0.5 mcg/kg/h.

## 2025-02-26 NOTE — PROGRESS NOTES
Shift assessment completed, see flow sheet.   RASS -1. Patient Beginning to follow some commands. Opens eyes to speech.      Intubated and sedated on AC # 7.5 ETT, at 24cm LL. 22 / 400 / 35 %/ +5.     SpO2 93%. Respirations are easy, even, and unlabored at this time.   Bilateral lung sounds are diminished rhonchi .     VSS  OG in place at 60 cm NL, with TF at 40 mL/hr, and water flushes of 30mL at Q3H 60mL residual.      CVC/ ML, WNL with CHG caps on all open ports. Dressings DCI    All lines and monitoring devices in place. Castrejon is patent and secured.    Bilateral soft wrist restraints in place for patient safety of pulling on lines and equipment.    Bed in lowest position with wheels locked, alarm on. No needs identified at this time. Will continue to monitor.      Electronically signed by Lindsey Booth RN on 2/26/2025 at 12:33 AM

## 2025-02-26 NOTE — PROGRESS NOTES
Care rounds complete with Dr. Romero. Patient to have doppler of upper extremities r/t edema. RT at bedside and started SBT, patient to trial as long as tolerating. Patient to have 40 mEq Kcl.

## 2025-02-26 NOTE — PROGRESS NOTES
02/26/25 0258   Patient Observation   Pulse 92   Respirations 27   SpO2 93 %   Patient Observations ETT 7.5/ 25@lips   Breath Sounds   Respiratory Pattern Regular   Right Upper Lobe Rhonchi   Right Middle Lobe Diminished   Right Lower Lobe Rhonchi   Left Upper Lobe Rhonchi   Left Lower Lobe Diminished   Vent Information   Vent Mode AC/VC   Ventilator Settings   FiO2  35 %   Vt (Set, mL) 400 mL   Resp Rate (Set) 22 bpm   PEEP/CPAP (cmH2O) 5   Vent Patient Data (Readings)   Vt (Measured) 514 mL   Peak Inspiratory Pressure (cmH2O) 41 cmH2O   Rate Measured 25 br/min   Minute Volume (L/min) 11.5 Liters   Peak Inspiratory Flow (lpm) 80 L/sec   Mean Airway Pressure (cmH2O) 12 cmH20   Plateau Pressure (cm H2O) 25 cm H2O   Driving Pressure 20   I:E Ratio 1:3.30   Flow Sensitivity 3 L/min   Backup Apnea On   Backup Rate 22 Breaths Per Minute   Backup Vt 500   Vent Alarm Settings   High Pressure (cmH2O) 55 cmH2O   Low Minute Volume (lpm) 4 L/min   High Minute Volume (lpm) 20 L/min   Low Exhaled Vt (ml) 250 mL   High Exhaled Vt (ml) 1000 mL   RR High (bpm) 40 br/min   Apnea (secs) 20 secs   Additional Respiratoray Assessments   Humidification Source Heated wire   Humidification Temp 36.7   Circuit Condensation Drained   Ambu Bag With Mask At Bedside Yes   Airway Clearance   Suction ET Tube   Subglottic Suction Done Yes   Suction Device Inline suction catheter   Sputum Method Obtained Endotracheal   Sputum Amount Large   Sputum Color/Odor White;Pink tinged   Sputum Consistency Thick   ETT    Placement Date/Time: 02/14/25 1135   Present on Admission/Arrival: No  Placed By: RT  Placement Verified By: Auscultation;Colorimetric ETCO2 device;Chest X-ray  Preoxygenation: Yes  Airway Tube Size: 7.5 mm  Secured At: 24 cm  Measured From: Lips   Secured At 25 cm   Measured From Lips   ETT Placement   (moved by RT)   Secured By Commercial tube quispe   Site Assessment Dry   Cuff Pressure   (mov)   Tie/Quispe Changed No

## 2025-02-26 NOTE — PROGRESS NOTES
Comprehensive Nutrition Assessment    Type and Reason for Visit:  Reassess    Nutrition Recommendations/Plan:   Continue TF regimen - ADULT TUBE FEEDING; Nasogastric tube; Diabetic formula - Glucerna 1.5 with a goal rate of 40 ml/hr x 20 hours + 30 ml water flushes every 3 hours + one proteinex P2Go once daily.   Monitor TF rate, intake, and tolerance + water flushes + administration of one proteinex P2Go once daily.   Monitor respiratory/vent status and plan of care.   Monitor nutrition-related labs, bowel function, and weight trends.      Malnutrition Assessment:  Malnutrition Status:  Severe malnutrition (02/26/25 1249)    Context:  Acute Illness     Findings of the 6 clinical characteristics of malnutrition:  Energy Intake:  50% or less of estimated energy requirements for 5 or more days  Weight Loss:  Mild weight loss (-8# or 4.5% weight loss since 1/31/25)     Body Fat Loss:  Moderate body fat loss Orbital   Muscle Mass Loss:  Mild muscle mass loss Clavicles (pectoralis & deltoids), Calf (gastrocnemius)  Fluid Accumulation:  Mild Generalized (generalized +2 pitting edema)   Strength:  Not Performed    Nutrition Assessment:    patient continues to improve from a nutritional standpoint AEB he is currently receiving TF at goal rate without issue and TPN was d/c'd; patient is at risk for further compromise d/t impaired respiratory function, need for EN as sole source of nutrition, and altered nutrition-related labs; will continue Glucerna 1.5 with a goal rate of 40 ml/hr x 20 hours + 30 ml water flushes every 3 hours for tube patency + will add one proteinex P2Go once daily    Nutrition Related Findings:    patient remains intubated; propofol is off; TF is infusing at goal rate without issue; + BM on 2/24/25; patient will wake up and squeezes with his R hand; on 20 units Lantus BID and high-dose SSI Wound Type: None       Current Nutrition Intake & Therapies:    Average Meal Intake: NPO  Average Supplements

## 2025-02-26 NOTE — PROGRESS NOTES
Progress Note    Admit Date:  1/30/2025    Interval history:  Influenza A and Acute respiratory failure   Was on bipap in iCU and improved, transferred to  west        Has NG placed for Ileus  , he pulled out NG and likely has aspirated became hypoxic and transferred to ICU      Seizure like event 2/7,   Remains confused  MRI brain incomplete .     2/13   Mentation worse today  On precedex   On 4 L of O2     2/21  Ct with resolving ileus  Started on TF @15 cc   Started weaning sedation  Repeat CT abd with no acute findings     2/22  S/p bronch yesterday.  Sedated on vent.    Subjective:  Mr. Early is off sedation. On precedex. Can follow simple commands. .    Objective:   Patient Vitals for the past 4 hrs:   BP Temp Temp src Pulse Resp SpO2 Height   02/26/25 1350 (!) 137/56 -- -- (!) 106 -- -- --   02/26/25 1300 (!) 137/56 -- -- (!) 105 28 95 % --   02/26/25 1244 -- -- -- -- -- -- 1.702 m (5' 7.01\")   02/26/25 1200 (!) 138/57 -- -- (!) 102 29 96 % --   02/26/25 1130 -- 100.3 °F (37.9 °C) Bladder -- -- -- --   02/26/25 1123 -- -- -- (!) 101 30 95 % --   02/26/25 1100 (!) 136/54 -- -- 98 30 96 % --   02/26/25 1000 131/60 -- -- 94 27 96 % --          Intake/Output Summary (Last 24 hours) at 2/26/2025 1357  Last data filed at 2/26/2025 1130  Gross per 24 hour   Intake 1411.38 ml   Output 4740 ml   Net -3328.62 ml       Physical Exam:        General: more awake and alert.  Oral ETT and OG noted  Left IJ TLC . Appears to be not in any distress  Mucous Membranes:  Pink , anicteric  Neck: No JVD, no carotid bruit, no thyromegaly  Chest:  Clear to auscultation bilaterally, resolved wheeze  Cardiovascular:  RRR S1S2 heard, no murmurs or gallops  Abdomen:  Soft, obese +distended, non tender, no organomegaly, BS absent  Extremities: trace edema to ext improved   Distal pulses well felt  Neurological : Open eyes follow simple commands     cations:  levETIRAcetam, 750 mg, BID  thiamine, 200 mg, Daily  furosemide, 40 mg,

## 2025-02-26 NOTE — PLAN OF CARE
Problem: Discharge Planning  Goal: Discharge to home or other facility with appropriate resources  Outcome: Progressing  Flowsheets (Taken 2/24/2025 2013)  Discharge to home or other facility with appropriate resources:   Identify barriers to discharge with patient and caregiver   Arrange for needed discharge resources and transportation as appropriate     Problem: Safety - Adult  Goal: Free from fall injury  Outcome: Progressing  Note: Monitor for signs of skin breakdown     Problem: Pain  Goal: Verbalizes/displays adequate comfort level or baseline comfort level  Outcome: Progressing  Flowsheets (Taken 2/26/2025 0240)  Verbalizes/displays adequate comfort level or baseline comfort level: Assess pain using appropriate pain scale     Problem: Respiratory - Adult  Goal: Achieves optimal ventilation and oxygenation  Outcome: Progressing  Flowsheets (Taken 2/26/2025 0240)  Achieves optimal ventilation and oxygenation:   Assess for changes in respiratory status   Position to facilitate oxygenation and minimize respiratory effort     Problem: Skin/Tissue Integrity - Adult  Goal: Skin integrity remains intact  Description: 1.  Monitor for areas of redness and/or skin breakdown  2.  Assess vascular access sites hourly  3.  Every 4-6 hours minimum:  Change oxygen saturation probe site  4.  Every 4-6 hours:  If on nasal continuous positive airway pressure, respiratory therapy assess nares and determine need for appliance change or resting period  Outcome: Progressing  Flowsheets (Taken 2/26/2025 0240)  Skin Integrity Remains Intact: Monitor for areas of redness and/or skin breakdown     Problem: Gastrointestinal - Adult  Goal: Maintains or returns to baseline bowel function  Outcome: Progressing  Flowsheets (Taken 2/26/2025 0240)  Maintains or returns to baseline bowel function: Assess bowel function  Goal: Maintains adequate nutritional intake  Outcome: Progressing  Flowsheets (Taken 2/26/2025 0240)  Maintains adequate

## 2025-02-27 ENCOUNTER — APPOINTMENT (OUTPATIENT)
Age: 70
DRG: 207 | End: 2025-02-27
Attending: INTERNAL MEDICINE
Payer: MEDICARE

## 2025-02-27 ENCOUNTER — APPOINTMENT (OUTPATIENT)
Dept: GENERAL RADIOLOGY | Age: 70
DRG: 207 | End: 2025-02-27
Payer: MEDICARE

## 2025-02-27 LAB
ANION GAP SERPL CALCULATED.3IONS-SCNC: 4 MMOL/L (ref 3–16)
BASE EXCESS BLDA CALC-SCNC: 16.4 MMOL/L (ref -3–3)
BASOPHILS # BLD: 0 K/UL (ref 0–0.2)
BASOPHILS NFR BLD: 0.6 %
BUN SERPL-MCNC: 16 MG/DL (ref 7–20)
CALCIUM SERPL-MCNC: 8 MG/DL (ref 8.3–10.6)
CHLORIDE SERPL-SCNC: 96 MMOL/L (ref 99–110)
CO2 BLDA-SCNC: 44 MMOL/L
CO2 SERPL-SCNC: 40 MMOL/L (ref 21–32)
COHGB MFR BLDA: 0.7 % (ref 0–1.5)
CREAT SERPL-MCNC: 0.7 MG/DL (ref 0.8–1.3)
DEPRECATED RDW RBC AUTO: 17.1 % (ref 12.4–15.4)
ECHO BSA: 2.1 M2
EOSINOPHIL # BLD: 0.1 K/UL (ref 0–0.6)
EOSINOPHIL NFR BLD: 1.7 %
GFR SERPLBLD CREATININE-BSD FMLA CKD-EPI: >90 ML/MIN/{1.73_M2}
GLUCOSE BLD-MCNC: 105 MG/DL (ref 70–99)
GLUCOSE BLD-MCNC: 105 MG/DL (ref 70–99)
GLUCOSE BLD-MCNC: 110 MG/DL (ref 70–99)
GLUCOSE BLD-MCNC: 110 MG/DL (ref 70–99)
GLUCOSE BLD-MCNC: 113 MG/DL (ref 70–99)
GLUCOSE BLD-MCNC: 121 MG/DL (ref 70–99)
GLUCOSE SERPL-MCNC: 106 MG/DL (ref 70–99)
HCO3 BLDA-SCNC: 42.1 MMOL/L (ref 21–29)
HCT VFR BLD AUTO: 23.1 % (ref 40.5–52.5)
HGB BLD-MCNC: 7.7 G/DL (ref 13.5–17.5)
HGB BLDA-MCNC: 8.4 G/DL (ref 13.5–17.5)
LYMPHOCYTES # BLD: 1.2 K/UL (ref 1–5.1)
LYMPHOCYTES NFR BLD: 17.8 %
MAGNESIUM SERPL-MCNC: 1.65 MG/DL (ref 1.8–2.4)
MCH RBC QN AUTO: 30.1 PG (ref 26–34)
MCHC RBC AUTO-ENTMCNC: 33.3 G/DL (ref 31–36)
MCV RBC AUTO: 90.2 FL (ref 80–100)
METHGB MFR BLDA: 0.3 %
MONOCYTES # BLD: 0.8 K/UL (ref 0–1.3)
MONOCYTES NFR BLD: 11.9 %
NEUTROPHILS # BLD: 4.7 K/UL (ref 1.7–7.7)
NEUTROPHILS NFR BLD: 68 %
O2 THERAPY: ABNORMAL
PCO2 BLDA: 60.3 MMHG (ref 35–45)
PERFORMED ON: ABNORMAL
PH BLDA: 7.46 [PH] (ref 7.35–7.45)
PHOSPHATE SERPL-MCNC: 3.7 MG/DL (ref 2.5–4.9)
PLATELET # BLD AUTO: 387 K/UL (ref 135–450)
PMV BLD AUTO: 7.7 FL (ref 5–10.5)
PO2 BLDA: 78.5 MMHG (ref 75–108)
POTASSIUM SERPL-SCNC: 3.4 MMOL/L (ref 3.5–5.1)
RBC # BLD AUTO: 2.56 M/UL (ref 4.2–5.9)
SAO2 % BLDA: 95.8 %
SODIUM SERPL-SCNC: 140 MMOL/L (ref 136–145)
WBC # BLD AUTO: 6.9 K/UL (ref 4–11)

## 2025-02-27 PROCEDURE — 2500000003 HC RX 250 WO HCPCS: Performed by: STUDENT IN AN ORGANIZED HEALTH CARE EDUCATION/TRAINING PROGRAM

## 2025-02-27 PROCEDURE — 6370000000 HC RX 637 (ALT 250 FOR IP): Performed by: INTERNAL MEDICINE

## 2025-02-27 PROCEDURE — 94003 VENT MGMT INPAT SUBQ DAY: CPT

## 2025-02-27 PROCEDURE — 6360000002 HC RX W HCPCS: Performed by: INTERNAL MEDICINE

## 2025-02-27 PROCEDURE — 94761 N-INVAS EAR/PLS OXIMETRY MLT: CPT

## 2025-02-27 PROCEDURE — 84100 ASSAY OF PHOSPHORUS: CPT

## 2025-02-27 PROCEDURE — 85025 COMPLETE CBC W/AUTO DIFF WBC: CPT

## 2025-02-27 PROCEDURE — 2700000000 HC OXYGEN THERAPY PER DAY

## 2025-02-27 PROCEDURE — 2500000003 HC RX 250 WO HCPCS: Performed by: INTERNAL MEDICINE

## 2025-02-27 PROCEDURE — 76937 US GUIDE VASCULAR ACCESS: CPT

## 2025-02-27 PROCEDURE — 99232 SBSQ HOSP IP/OBS MODERATE 35: CPT | Performed by: INTERNAL MEDICINE

## 2025-02-27 PROCEDURE — 83735 ASSAY OF MAGNESIUM: CPT

## 2025-02-27 PROCEDURE — 93970 EXTREMITY STUDY: CPT

## 2025-02-27 PROCEDURE — 6360000002 HC RX W HCPCS

## 2025-02-27 PROCEDURE — 2000000000 HC ICU R&B

## 2025-02-27 PROCEDURE — 80048 BASIC METABOLIC PNL TOTAL CA: CPT

## 2025-02-27 PROCEDURE — 94640 AIRWAY INHALATION TREATMENT: CPT

## 2025-02-27 PROCEDURE — 82803 BLOOD GASES ANY COMBINATION: CPT

## 2025-02-27 PROCEDURE — 99291 CRITICAL CARE FIRST HOUR: CPT | Performed by: INTERNAL MEDICINE

## 2025-02-27 PROCEDURE — 71045 X-RAY EXAM CHEST 1 VIEW: CPT

## 2025-02-27 PROCEDURE — 2580000003 HC RX 258: Performed by: INTERNAL MEDICINE

## 2025-02-27 PROCEDURE — 93970 EXTREMITY STUDY: CPT | Performed by: SURGERY

## 2025-02-27 RX ORDER — INSULIN GLARGINE 100 [IU]/ML
10 INJECTION, SOLUTION SUBCUTANEOUS 2 TIMES DAILY
Status: DISCONTINUED | OUTPATIENT
Start: 2025-02-27 | End: 2025-03-05

## 2025-02-27 RX ORDER — FUROSEMIDE 10 MG/ML
40 INJECTION INTRAMUSCULAR; INTRAVENOUS DAILY
Status: DISCONTINUED | OUTPATIENT
Start: 2025-02-28 | End: 2025-03-03

## 2025-02-27 RX ORDER — ALBUTEROL SULFATE 0.83 MG/ML
2.5 SOLUTION RESPIRATORY (INHALATION) EVERY 4 HOURS PRN
Status: DISCONTINUED | OUTPATIENT
Start: 2025-02-27 | End: 2025-03-25 | Stop reason: HOSPADM

## 2025-02-27 RX ADMIN — METOPROLOL TARTRATE 50 MG: 50 TABLET, FILM COATED ORAL at 08:24

## 2025-02-27 RX ADMIN — ENOXAPARIN SODIUM 40 MG: 100 INJECTION SUBCUTANEOUS at 08:24

## 2025-02-27 RX ADMIN — ALBUTEROL SULFATE 2.5 MG: 2.5 SOLUTION RESPIRATORY (INHALATION) at 15:21

## 2025-02-27 RX ADMIN — ALBUTEROL SULFATE 2.5 MG: 2.5 SOLUTION RESPIRATORY (INHALATION) at 11:33

## 2025-02-27 RX ADMIN — ALLOPURINOL 300 MG: 300 TABLET ORAL at 08:24

## 2025-02-27 RX ADMIN — FENTANYL CITRATE 50 MCG: 50 INJECTION, SOLUTION INTRAMUSCULAR; INTRAVENOUS at 04:26

## 2025-02-27 RX ADMIN — Medication 750 MG: at 09:07

## 2025-02-27 RX ADMIN — PANTOPRAZOLE SODIUM 40 MG: 40 INJECTION, POWDER, LYOPHILIZED, FOR SOLUTION INTRAVENOUS at 08:24

## 2025-02-27 RX ADMIN — DILTIAZEM HYDROCHLORIDE 60 MG: 60 TABLET ORAL at 06:21

## 2025-02-27 RX ADMIN — METOCLOPRAMIDE 10 MG: 5 INJECTION, SOLUTION INTRAMUSCULAR; INTRAVENOUS at 14:08

## 2025-02-27 RX ADMIN — ALBUTEROL SULFATE 2.5 MG: 2.5 SOLUTION RESPIRATORY (INHALATION) at 19:56

## 2025-02-27 RX ADMIN — DILTIAZEM HYDROCHLORIDE 60 MG: 60 TABLET ORAL at 14:08

## 2025-02-27 RX ADMIN — GABAPENTIN 200 MG: 250 SOLUTION ORAL at 14:08

## 2025-02-27 RX ADMIN — POTASSIUM CHLORIDE 20 MEQ: 400 INJECTION, SOLUTION INTRAVENOUS at 08:30

## 2025-02-27 RX ADMIN — MAGNESIUM SULFATE HEPTAHYDRATE 2000 MG: 40 INJECTION, SOLUTION INTRAVENOUS at 08:27

## 2025-02-27 RX ADMIN — DILTIAZEM HYDROCHLORIDE 60 MG: 60 TABLET ORAL at 22:32

## 2025-02-27 RX ADMIN — METOCLOPRAMIDE 10 MG: 5 INJECTION, SOLUTION INTRAMUSCULAR; INTRAVENOUS at 20:19

## 2025-02-27 RX ADMIN — SODIUM CHLORIDE, PRESERVATIVE FREE 10 ML: 5 INJECTION INTRAVENOUS at 20:18

## 2025-02-27 RX ADMIN — METOPROLOL TARTRATE 50 MG: 50 TABLET, FILM COATED ORAL at 20:19

## 2025-02-27 RX ADMIN — GABAPENTIN 200 MG: 250 SOLUTION ORAL at 08:24

## 2025-02-27 RX ADMIN — METOCLOPRAMIDE 10 MG: 5 INJECTION, SOLUTION INTRAMUSCULAR; INTRAVENOUS at 04:26

## 2025-02-27 RX ADMIN — Medication 5000 UNITS: at 08:24

## 2025-02-27 RX ADMIN — METOCLOPRAMIDE 10 MG: 5 INJECTION, SOLUTION INTRAMUSCULAR; INTRAVENOUS at 08:25

## 2025-02-27 RX ADMIN — ATORVASTATIN CALCIUM 40 MG: 40 TABLET, FILM COATED ORAL at 08:24

## 2025-02-27 RX ADMIN — Medication 200 MG: at 08:24

## 2025-02-27 RX ADMIN — GABAPENTIN 200 MG: 250 SOLUTION ORAL at 17:48

## 2025-02-27 RX ADMIN — POTASSIUM CHLORIDE 20 MEQ: 400 INJECTION, SOLUTION INTRAVENOUS at 09:08

## 2025-02-27 RX ADMIN — GABAPENTIN 200 MG: 250 SOLUTION ORAL at 20:18

## 2025-02-27 RX ADMIN — INSULIN GLARGINE 10 UNITS: 100 INJECTION, SOLUTION SUBCUTANEOUS at 20:38

## 2025-02-27 RX ADMIN — FUROSEMIDE 40 MG: 10 INJECTION, SOLUTION INTRAMUSCULAR; INTRAVENOUS at 08:25

## 2025-02-27 RX ADMIN — ALBUTEROL SULFATE 2.5 MG: 2.5 SOLUTION RESPIRATORY (INHALATION) at 07:55

## 2025-02-27 RX ADMIN — Medication 750 MG: at 20:20

## 2025-02-27 ASSESSMENT — PULMONARY FUNCTION TESTS
PIF_VALUE: 30
PIF_VALUE: 30
PIF_VALUE: 37
PIF_VALUE: 28
PIF_VALUE: 15
PIF_VALUE: 40
PIF_VALUE: 28
PIF_VALUE: 14
PIF_VALUE: 27
PIF_VALUE: 34
PIF_VALUE: 14
PIF_VALUE: 36
PIF_VALUE: 32
PIF_VALUE: 32
PIF_VALUE: 14
PIF_VALUE: 15
PIF_VALUE: 14
PIF_VALUE: 30
PIF_VALUE: 14
PIF_VALUE: 31
PIF_VALUE: 14
PIF_VALUE: 38

## 2025-02-27 ASSESSMENT — PAIN SCALES - WONG BAKER: WONGBAKER_NUMERICALRESPONSE: NO HURT

## 2025-02-27 ASSESSMENT — PAIN SCALES - GENERAL
PAINLEVEL_OUTOF10: 0
PAINLEVEL_OUTOF10: 0
PAINLEVEL_OUTOF10: 5
PAINLEVEL_OUTOF10: 0

## 2025-02-27 ASSESSMENT — PAIN DESCRIPTION - LOCATION: LOCATION: GENERALIZED

## 2025-02-27 NOTE — PLAN OF CARE
Problem: Chronic Conditions and Co-morbidities  Goal: Patient's chronic conditions and co-morbidity symptoms are monitored and maintained or improved  2/27/2025 0922 by Nevaeh Jackman RN  Outcome: Progressing  2/27/2025 0308 by Lindsey Booth RN  Outcome: Progressing  Flowsheets (Taken 2/25/2025 0410)  Care Plan - Patient's Chronic Conditions and Co-Morbidity Symptoms are Monitored and Maintained or Improved:   Monitor and assess patient's chronic conditions and comorbid symptoms for stability, deterioration, or improvement   Collaborate with multidisciplinary team to address chronic and comorbid conditions and prevent exacerbation or deterioration   Update acute care plan with appropriate goals if chronic or comorbid symptoms are exacerbated and prevent overall improvement and discharge     Problem: Discharge Planning  Goal: Discharge to home or other facility with appropriate resources  2/27/2025 0922 by Nevaeh Jackman RN  Outcome: Progressing  2/27/2025 0308 by Lindsey Booth RN  Outcome: Progressing  Flowsheets (Taken 2/27/2025 0308)  Discharge to home or other facility with appropriate resources:   Identify barriers to discharge with patient and caregiver   Arrange for needed discharge resources and transportation as appropriate     Problem: Safety - Adult  Goal: Free from fall injury  2/27/2025 0922 by Nevaeh Jackman RN  Outcome: Progressing  2/27/2025 0308 by Lindsey Booth RN  Outcome: Progressing  Note: Bed rails padded for Seizure precaution. Fall risk protocol active.     Problem: Pain  Goal: Verbalizes/displays adequate comfort level or baseline comfort level  2/27/2025 0922 by Nevaeh Jackman RN  Outcome: Progressing  2/27/2025 0308 by Lindsey Booth RN  Flowsheets (Taken 2/27/2025 0308)  Verbalizes/displays adequate comfort level or baseline comfort level: Assess pain using appropriate pain scale     Problem: Respiratory - Adult  Goal: Achieves optimal ventilation and oxygenation  2/27/2025

## 2025-02-27 NOTE — PLAN OF CARE
Problem: Chronic Conditions and Co-morbidities  Goal: Patient's chronic conditions and co-morbidity symptoms are monitored and maintained or improved  Outcome: Progressing  Flowsheets (Taken 2/25/2025 0410)  Care Plan - Patient's Chronic Conditions and Co-Morbidity Symptoms are Monitored and Maintained or Improved:   Monitor and assess patient's chronic conditions and comorbid symptoms for stability, deterioration, or improvement   Collaborate with multidisciplinary team to address chronic and comorbid conditions and prevent exacerbation or deterioration   Update acute care plan with appropriate goals if chronic or comorbid symptoms are exacerbated and prevent overall improvement and discharge     Problem: Discharge Planning  Goal: Discharge to home or other facility with appropriate resources  Outcome: Progressing  Flowsheets (Taken 2/27/2025 0308)  Discharge to home or other facility with appropriate resources:   Identify barriers to discharge with patient and caregiver   Arrange for needed discharge resources and transportation as appropriate     Problem: Safety - Adult  Goal: Free from fall injury  Outcome: Progressing  Note: Bed rails padded for Seizure precaution. Fall risk protocol active.     Problem: Respiratory - Adult  Goal: Achieves optimal ventilation and oxygenation  Outcome: Progressing  Flowsheets (Taken 2/26/2025 0240)  Achieves optimal ventilation and oxygenation:   Assess for changes in respiratory status   Position to facilitate oxygenation and minimize respiratory effort  Note: ETT and oral suctioning for increased secretions     Problem: Skin/Tissue Integrity - Adult  Goal: Skin integrity remains intact  Description: 1.  Monitor for areas of redness and/or skin breakdown  2.  Assess vascular access sites hourly  3.  Every 4-6 hours minimum:  Change oxygen saturation probe site  4.  Every 4-6 hours:  If on nasal continuous positive airway pressure, respiratory therapy assess nares and

## 2025-02-27 NOTE — PROGRESS NOTES
[facility] Vascular Access  Ultrasound Guided Peripheral Insertion Procedure Note.     Sandor Early   Admitted- 1/30/2025  5:28 PM  Admission diagnosis- Hypomagnesemia [E83.42]  Influenza A [J10.1]  COPD exacerbation (HCC) [J44.1]  Respiratory failure with hypoxia (HCC) [J96.91]  Alcohol use disorder [F10.90]  Acute on chronic respiratory failure with hypoxia and hypercapnia (HCC) [J96.21, J96.22]  Acute respiratory failure (HCC) [J96.00]      Attending Physician- Drake Jalloh*  Ordering Physician- Dr. Candace Mckeon  Indication for Insertion: Limited Access          USG PIV placed to right upper arm using sterile technique. Brisk blood return noted, line flushes with ease. Patient tolerated well. Bed returned to lowest position, call light within reach. See US images below.

## 2025-02-27 NOTE — PROGRESS NOTES
Rounding completed with Dr. Romero and multidisciplinary team. POC, labs, lines and assessment reviewed.   - Leave CVC until doppler is complete. If +for DVT take of midline and PIV and leave CVC.   - Leave on trial 8/5 for as long as he will tolerate

## 2025-02-27 NOTE — PROGRESS NOTES
02/26/25 2320   Patient Observation   Pulse 89   Respirations 23   SpO2 96 %   Patient Observations ETT SIZE 7.5, SECURED AT 25 LIP LINE.  AMBU BAG AT HEAD OF BED.   Breath Sounds   Right Upper Lobe Rhonchi   Right Middle Lobe Diminished   Right Lower Lobe Diminished   Left Upper Lobe Rhonchi   Left Lower Lobe Diminished   Vent Information   Vent Mode AC/VC   Ventilator Settings   FiO2  50 %   Vt (Set, mL) 400 mL   Resp Rate (Set) 22 bpm   PEEP/CPAP (cmH2O) 5   Vent Patient Data (Readings)   Vt (Measured) 511 mL   Peak Inspiratory Pressure (cmH2O) 34 cmH2O   Rate Measured 24 br/min   Minute Volume (L/min) 11.3 Liters   Peak Inspiratory Flow (lpm) 80 L/sec   Mean Airway Pressure (cmH2O) 11 cmH20   Plateau Pressure (cm H2O) 18 cm H2O   Driving Pressure 13   I:E Ratio 1:4.10   Flow Sensitivity 3 L/min   Static Compliance (L/cm H2O) 39   Dynamic Compliance (L/cm H2O) 18 L/cm H2O   Vent Alarm Settings   High Pressure (cmH2O) 55 cmH2O   Low Minute Volume (lpm) 4 L/min   High Minute Volume (lpm) 20 L/min   Low Exhaled Vt (ml) 250 mL   High Exhaled Vt (ml) 1000 mL   RR High (bpm) 40 br/min   Apnea (secs) 20 secs   Additional Respiratoray Assessments   Humidification Source Heated wire   Humidification Temp 37.1   Circuit Condensation Drained   Ambu Bag With Mask At Bedside Yes   Airway Clearance   Suction ET Tube   Suction Device Inline suction catheter   Sputum Method Obtained Endotracheal   Sputum Amount Small   Sputum Color/Odor Clear   Sputum Consistency Thick   ETT    Placement Date/Time: 02/14/25 1135   Present on Admission/Arrival: No  Placed By: RT  Placement Verified By: Auscultation;Colorimetric ETCO2 device;Chest X-ray  Preoxygenation: Yes  Airway Tube Size: 7.5 mm  Secured At: 24 cm  Measured From: Lips   Secured At 25 cm   Measured From Lips   ETT Placement (S)  Right   Secured By Commercial tube may   Site Assessment Dry

## 2025-02-27 NOTE — PROGRESS NOTES
Patient waking and calm. He is tolerating the ventilator. RAZZ -1 to 0. Spo2 97%. Titrated Precedex to off. Plan to SBT this morning.    Electronically signed by Lindsey Booth RN on 2/27/2025 at 3:08 AM

## 2025-02-27 NOTE — PROGRESS NOTES
Shift assessment completed, see flow sheet.   RASS 0. Following commands.         Intubated and sedated on AC # 7.5 ETT, at 24 LL. 22 / 400 / 50 %/ +5.   SpO2 95%. Respirations are easy, even, and unlabored.   Bilateral lung sounds rhonchi.      VSS  NG in place at 65. TF paused for SBT.      CVC/PIV, WNL.  Plan to remove CVC now that we have Midline and PIV.     All lines and monitoring devices in place. Castrejon is patent and secured.  Bilateral soft wrist restraints in place for patient safety.  Bed in lowest position with wheels locked.

## 2025-02-27 NOTE — FLOWSHEET NOTE
02/27/25 0416   Vitals   Pulse (!) 125   Respirations 24     Patient waking and coughing against the ventilator. Oral and ETT suction performed. He appears anxious. Asked hi if he was having pain? Noted that he slightly nodded his head.    Medicated with 50 mcg Fentanyl IVP.    After about 5 minutes patient was more calm, , and respirations were at vent setting of 22 BPM.    He also tolerated portable xray.    Electronically signed by Lindsey Booth RN on 2/27/2025 at 4:55 AM

## 2025-02-27 NOTE — PROGRESS NOTES
02/27/25 0304   Patient Observation   Pulse (!) 101   Respirations 24   SpO2 97 %   Patient Observations ETT SIZE 7.5, SECURED AT 25 LIP LINE.  AMBU BAG AT EHAD OF BED.  WATER GOOD.   Breath Sounds   Right Upper Lobe Rhonchi   Right Middle Lobe Diminished   Right Lower Lobe Diminished   Left Upper Lobe Rhonchi   Left Lower Lobe Diminished   Vent Information   Vent Mode AC/VC   Ventilator Settings   FiO2  50 %   Vt (Set, mL) 400 mL   Resp Rate (Set) 22 bpm   PEEP/CPAP (cmH2O) 5   Vent Patient Data (Readings)   Vt (Measured) 461 mL   Peak Inspiratory Pressure (cmH2O) 31 cmH2O   Rate Measured 25 br/min   Minute Volume (L/min) 11.6 Liters   Peak Inspiratory Flow (lpm) 80 L/sec   Mean Airway Pressure (cmH2O) 11 cmH20   Plateau Pressure (cm H2O) 19 cm H2O   Driving Pressure 14   I:E Ratio 1:3.50   Flow Sensitivity 3 L/min   Vent Alarm Settings   High Pressure (cmH2O) 55 cmH2O   Low Minute Volume (lpm) 4 L/min   High Minute Volume (lpm) 20 L/min   Low Exhaled Vt (ml) 250 mL   High Exhaled Vt (ml) 1000 mL   RR High (bpm) 40 br/min   Apnea (secs) 20 secs   Additional Respiratoray Assessments   Humidification Source Heated wire   Humidification Temp 36.9   Circuit Condensation Drained   Ambu Bag With Mask At Bedside Yes   Airway Clearance   Suction ET Tube   Suction Device Inline suction catheter   Sputum Method Obtained Endotracheal   Sputum Amount Moderate   Sputum Color/Odor White   Sputum Consistency Thick   ETT    Placement Date/Time: 02/14/25 1135   Present on Admission/Arrival: No  Placed By: RT  Placement Verified By: Auscultation;Colorimetric ETCO2 device;Chest X-ray  Preoxygenation: Yes  Airway Tube Size: 7.5 mm  Secured At: 24 cm  Measured From: Lips   Secured At 25 cm   Measured From Lips   ETT Placement (S)  Left   Secured By Commercial tube may   Site Assessment Dry

## 2025-02-27 NOTE — PROGRESS NOTES
mg, Once  metoclopramide, 10 mg, Q6H  insulin lispro, 0-16 Units, Q4H  insulin glargine, 20 Units, BID  dilTIAZem, 60 mg, 3 times per day  metoprolol tartrate, 50 mg, BID  Gabapentin, 200 mg, 4x Daily  albuterol, 2.5 mg, 4x Daily RT  sodium chloride flush, 5-40 mL, 2 times per day  [Held by provider] zonisamide, 100 mg, Daily  pantoprazole (PROTONIX) 40 mg in sodium chloride (PF) 0.9 % 10 mL injection, 40 mg, Daily  enoxaparin, 40 mg, Daily  allopurinol, 300 mg, Daily  vitamin D3, 5,000 Units, Daily  [Held by provider] DULoxetine, 60 mg, Daily  atorvastatin, 40 mg, Daily      PRN Medications:  albuterol, 2.5 mg, Q4H PRN  carboxymethylcellulose PF, 1 drop, PRN  sodium chloride flush, 5-40 mL, PRN  sodium chloride, , PRN  sodium chloride, , PRN  midazolam, 2 mg, Q1H PRN  fentanNYL, 50 mcg, Q2H PRN  sodium chloride flush, 5-40 mL, PRN  diatrizoate meglumine-sodium, 12 mL, ONCE PRN  [Held by provider] guaiFENesin, 200 mg, Q4H PRN  benzocaine, , 4x Daily PRN  phenol, 1 spray, Q2H PRN  sodium chloride, , PRN  ondansetron, 4 mg, Q8H PRN   Or  ondansetron, 4 mg, Q6H PRN  [Held by provider] acetaminophen, 650 mg, Q6H PRN   Or  [Held by provider] acetaminophen, 650 mg, Q6H PRN  dextrose bolus, 125 mL, PRN   Or  dextrose bolus, 250 mL, PRN  glucagon (rDNA), 1 mg, PRN  dextrose, , Continuous PRN  potassium chloride, 20 mEq, PRN   Or  potassium chloride, 10 mEq, PRN  magnesium sulfate, 2,000 mg, PRN  [Held by provider] oxyCODONE HCl, 10 mg, Q8H PRN  glucose, 4 tablet, PRN          Data:  CBC:   Recent Labs     02/25/25  0500 02/26/25  0510 02/27/25  0446   WBC 5.1 5.7 6.9   HGB 7.3* 7.6* 7.7*   HCT 22.2* 23.1* 23.1*   MCV 90.4 90.7 90.2    367 387     BMP:   Recent Labs     02/25/25  0500 02/26/25  0510 02/27/25  0446    139 140   K 3.9 3.6 3.4*   CL 97* 96* 96*   CO2 38* 38* 40*   PHOS 4.0 4.0 3.7   BUN 15 16 16   CREATININE 0.6* 0.7* 0.7*     LIVER PROFILE:   Recent Labs     02/26/25  0510   AST 36   ALT 33  ORDERED BY: BARBY SANTIAGO  SOURCE: Nares                              COLLECTED:  02/13/25 11:45  ANTIBIOTICS AT [facility].:                      RECEIVED :  02/13/25 12:25  CALL  [place]  [facility] tel. [phone],  Previous panic on this admission - call not needed per SOP, 02/14/2025 12:28,  by [facility]    Culture, Blood 1 [[id number]] Collected: 02/13/25 1117    Order Status: Completed Specimen: Blood Updated: 02/17/25 1715     Blood Culture, Routine No Growth after 4 days of incubation.    Narrative:      ORDER#: [id number]                          ORDERED BY: BARBY SANTIAGO  SOURCE: Blood                              COLLECTED:  02/13/25 11:17  ANTIBIOTICS AT [facility].:                      RECEIVED :  02/13/25 11:17  If child <=2 yrs old please draw pediatric bottle.~Blood Culture 1    Culture, Blood 2 [[id number]] Collected: 02/13/25 1117    Order Status: Completed Specimen: Blood Updated: 02/17/25 1715     Culture, Blood 2 No Growth after 4 days of incubation.    Narrative:      ORDER#: [id number]                          ORDERED BY: BARBY SANTIAGO  SOURCE: Blood                              COLLECTED:  02/13/25 11:17  ANTIBIOTICS AT [facility].:                      RECEIVED :  02/13/25 11:17  If child <=2 yrs old please draw pediatric bottle.~Blood Culture #2               RADIOLOGY  XR CHEST PORTABLE   Final Result   Minimal vascular congestion with a small left pleural effusion.         XR CHEST PORTABLE   Final Result   No evidence of acute cardiopulmonary process.         XR CHEST PORTABLE   Final Result   No significant finding in the chest.         XR ABDOMEN FOR NG/OG/NE TUBE PLACEMENT   Final Result   Nasogastric tube with tip overlying gastric antrum.         XR CHEST PORTABLE   Final Result   1.  Lines and tubes as above.  The NG side port is near the GE junction and   could be advanced by 5-10 cm.      2.  Interstitial opacities in both lower lungs are redemonstrated.         XR CHEST PORTABLE   Final Result   Bibasilar

## 2025-02-27 NOTE — PROGRESS NOTES
Doppler complete at this time. Awaiting the results to be published to know if we can pull out CVC

## 2025-02-27 NOTE — PROGRESS NOTES
Shift assessment completed, see flow sheet.   RASS -1. Patient is not following commands. Continues with precedex @ 0./5/mcg/hr    Intubated and sedated on AC # 7.5 ETT, at 24 cm LL. 22 / 400 / 50 %/ +5.   SpO2 95%. Respirations are easy, even, and unlabored.   Bilateral lung sounds Rhonchi bilateral upper lobes and diminished in bases.     VSS    Vitals:    02/26/25 2000   BP: (!) 148/55   Pulse: (!) 120   Resp: 26   Temp: (!) 100.9 °F (38.3 °C)   SpO2: 95%       OG in place at 65 cm LNL, with TF at 40 mL/hr and water 30 ml q3h, 35 ml residual.      CVC/ML, WNL all access ports with CHG caps.     All lines and monitoring devices in place. Castrejon is patent and secured with good  output. See I&O flowsheet    Bilateral soft wrist restraints in place for patient safety of pulling on lines and equipment d/t use of continuous IV sedation.     Bed in lowest position with wheels locked. Rails up 3/4. No needs identified at this time. Will continue to monitor.    Electronically signed by Lindsey Booth RN on 2/26/2025 at 8:29 PM

## 2025-02-27 NOTE — PLAN OF CARE
Patient is not able to demonstrate the ability to move from a reclining position to an upright position within the recliner due to sedation and intubated.      4 Eyes Skin Assessment     NAME:  Sandor Early  YOB: 1955  MEDICAL RECORD NUMBER:  [id number]    The patient is being assessed for  Shift Handoff    I agree that at least one RN has performed a thorough Head to Toe Skin Assessment on the patient. ALL assessment sites listed below have been assessed.      Areas assessed by both nurses:    Head, Face, Ears, Shoulders, Back, Chest, Arms, Elbows, Hands, Sacrum. Buttock, Coccyx, Ischium, Legs. Feet and Heels, and Under Medical Devices         Does the Patient have a Wound? No noted wound(s)       Abraham Prevention initiated by RN: Yes  Wound Care Orders initiated by RN: No    Pressure Injury (Stage 3,4, Unstageable, DTI, NWPT, and Complex wounds) if present, place Wound referral order by RN under : No    New Ostomies, if present place, Ostomy referral order under : No     Nurse 1 eSignature: Electronically signed by Lindsey Booth RN on 2/27/25 at 5:06 AM EST    **SHARE this note so that the co-signing nurse can place an eSignature**    Nurse 2 eSignature: Electronically signed by Claire Salazar RN on 2/27/25 at 5:58 AM EST

## 2025-02-27 NOTE — PROGRESS NOTES
Spoke with Dr. Romero regarding pts PIV and midline. He states he wants them removed and to leave the CVC in place for access.

## 2025-02-27 NOTE — PROGRESS NOTES
Pulmonary & Critical Care Medicine ICU Progress Note    CC: COPD respiratory failure    Events of Last 24 hours:   TPN started -    Propofol off   Precedex 0.7 ---> 0 mcg/kg/hr   PEEP 5  FiO2 50%  Did 6 hrs 10/5 yesterday, this am          Vascular lines: IV: Left IJ -. R Midline     MV:   Vent Mode: AC/VC Resp Rate (Set): 22 bpm/Vt (Set, mL): 400 mL/ /FiO2 : 50 %  Recent Labs     25  0510 25  0446   PHART 7.475* 7.462*   [id number] 49.9* 60.3*   PO2ART 61.1* 78.5       IV:   sodium chloride      sodium chloride      dexmedeTOMIDine HCl in NaCl Stopped (25 0246)    sodium chloride      dextrose         Vitals:  Blood pressure (!) 158/73, pulse (!) 126, temperature 100.3 °F (37.9 °C), temperature source Bladder, resp. rate 26, height 1.702 m (5' 7.01\"), weight 93.8 kg (206 lb 12.7 oz), SpO2 96%.  on   Temp  Av.1 °F (37.8 °C)  Min: 98.6 °F (37 °C)  Max: 100.9 °F (38.3 °C)    Intake/Output Summary (Last 24 hours) at 2025 0717  Last data filed at 2025 0626  Gross per 24 hour   Intake 1351.03 ml   Output 3571 ml   Net -2219.97 ml     EXAM:  General: ill appearing.  Intubated sedated.  Eyes: No sclera icterus. No conjunctival injection.  ENT: No discharge. ET tube in place  Neck: Trachea midline.   Resp: No accessory muscle use. Few crackles. No wheezing. + rhonchi.   CV: Regular  rate. Regular rhythm. No mumur or rub. 1-2 LE edema.   GI: Non-tender. Non-distended.   Skin: Warm and dry. No rash on exposed extremities.  M/S: No cyanosis. No clubbing.   Neuro: Intubated sedated.  + following commands. Responsive to painful stimuli  Psych: Noncommunicative unable to obtain        Scheduled Meds:   levETIRAcetam  750 mg Oral BID    thiamine  200 mg Oral Daily    furosemide  40 mg IntraVENous Q12H    sodium chloride flush  5-40 mL IntraVENous 2 times per day    lidocaine 1 % injection  50 mg IntraDERmal Once    metoclopramide  10 mg IntraVENous Q6H    insulin

## 2025-02-27 NOTE — PROGRESS NOTES
RT Inhaler-Nebulizer Bronchodilator Protocol Note    There is a bronchodilator order in the chart from a provider indicating to follow the RT Bronchodilator Protocol and there is an “Initiate RT Inhaler-Nebulizer Bronchodilator Protocol” order as well (see protocol at bottom of note).    CXR Findings:  XR CHEST PORTABLE    Result Date: 2/26/2025  No evidence of acute cardiopulmonary process.     XR CHEST PORTABLE    Result Date: 2/25/2025  No significant finding in the chest.       The findings from the last RT Protocol Assessment were as follows:   History Pulmonary Disease: Chronic pulmonary disease  Respiratory Pattern: Dyspnea on exertion or RR 21-25 bpm  Breath Sounds: Inspiratory and expiratory or bilateral wheezing and/or rhonchi  Cough: Strong, productive  Indication for Bronchodilator Therapy: Decreased or absent breath sounds  Bronchodilator Assessment Score: 11    Aerosolized bronchodilator medication orders have been revised according to the RT Inhaler-Nebulizer Bronchodilator Protocol below.    Respiratory Therapist to perform RT Therapy Protocol Assessment initially then follow the protocol.  Repeat RT Therapy Protocol Assessment PRN for score 0-3 or on second treatment, BID, and PRN for scores above 3.    No Indications - adjust the frequency to every 6 hours PRN wheezing or bronchospasm, if no treatments needed after 48 hours then discontinue using Per Protocol order mode.     If indication present, adjust the RT bronchodilator orders based on the Bronchodilator Assessment Score as indicated below.  Use Inhaler orders unless patient has one or more of the following: on home nebulizer, not able to hold breath for 10 seconds, is not alert and oriented, cannot activate and use MDI correctly, or respiratory rate 25 breaths per minute or more, then use the equivalent nebulizer order(s) with same Frequency and PRN reasons based on the score.  If a patient is on this medication at home then do not decrease  Frequency below that used at home.    0-3 - enter or revise RT bronchodilator order(s) to equivalent RT Bronchodilator order with Frequency of every 4 hours PRN for wheezing or increased work of breathing using Per Protocol order mode.        4-6 - enter or revise RT Bronchodilator order(s) to two equivalent RT bronchodilator orders with one order with BID Frequency and one order with Frequency of every 4 hours PRN wheezing or increased work of breathing using Per Protocol order mode.        7-10 - enter or revise RT Bronchodilator order(s) to two equivalent RT bronchodilator orders with one order with TID Frequency and one order with Frequency of every 4 hours PRN wheezing or increased work of breathing using Per Protocol order mode.       11-13 - enter or revise RT Bronchodilator order(s) to one equivalent RT bronchodilator order with QID Frequency and an Albuterol order with Frequency of every 4 hours PRN wheezing or increased work of breathing using Per Protocol order mode.      Greater than 13 - enter or revise RT Bronchodilator order(s) to one equivalent RT bronchodilator order with every 4 hours Frequency and an Albuterol order with Frequency of every 2 hours PRN wheezing or increased work of breathing using Per Protocol order mode.     .    Electronically signed by Padmini Rojas RCP on 2/26/2025 at 10:41 PM

## 2025-02-28 ENCOUNTER — APPOINTMENT (OUTPATIENT)
Dept: GENERAL RADIOLOGY | Age: 70
DRG: 207 | End: 2025-02-28
Payer: MEDICARE

## 2025-02-28 PROBLEM — G93.41 ACUTE METABOLIC ENCEPHALOPATHY: Status: ACTIVE | Noted: 2025-02-28

## 2025-02-28 LAB
ALBUMIN SERPL-MCNC: 2.1 G/DL (ref 3.4–5)
ALP SERPL-CCNC: 251 U/L (ref 40–129)
ALT SERPL-CCNC: 22 U/L (ref 10–40)
ANION GAP SERPL CALCULATED.3IONS-SCNC: 6 MMOL/L (ref 3–16)
AST SERPL-CCNC: 30 U/L (ref 15–37)
BASE EXCESS BLDA CALC-SCNC: 14.4 MMOL/L (ref -3–3)
BASE EXCESS BLDA CALC-SCNC: 15.1 MMOL/L (ref -3–3)
BILIRUB DIRECT SERPL-MCNC: 0.2 MG/DL (ref 0–0.3)
BILIRUB INDIRECT SERPL-MCNC: 0.1 MG/DL (ref 0–1)
BILIRUB SERPL-MCNC: 0.3 MG/DL (ref 0–1)
BUN SERPL-MCNC: 16 MG/DL (ref 7–20)
CALCIUM SERPL-MCNC: 8.3 MG/DL (ref 8.3–10.6)
CHLORIDE SERPL-SCNC: 97 MMOL/L (ref 99–110)
CO2 BLDA-SCNC: 42.7 MMOL/L
CO2 BLDA-SCNC: 43.8 MMOL/L
CO2 SERPL-SCNC: 38 MMOL/L (ref 21–32)
COHGB MFR BLDA: 0.3 % (ref 0–1.5)
COHGB MFR BLDA: 0.5 % (ref 0–1.5)
CREAT SERPL-MCNC: 0.6 MG/DL (ref 0.8–1.3)
DEPRECATED RDW RBC AUTO: 17.2 % (ref 12.4–15.4)
GFR SERPLBLD CREATININE-BSD FMLA CKD-EPI: >90 ML/MIN/{1.73_M2}
GLUCOSE BLD-MCNC: 111 MG/DL (ref 70–99)
GLUCOSE BLD-MCNC: 113 MG/DL (ref 70–99)
GLUCOSE BLD-MCNC: 115 MG/DL (ref 70–99)
GLUCOSE BLD-MCNC: 124 MG/DL (ref 70–99)
GLUCOSE BLD-MCNC: 95 MG/DL (ref 70–99)
GLUCOSE SERPL-MCNC: 103 MG/DL (ref 70–99)
HCO3 BLDA-SCNC: 40.8 MMOL/L (ref 21–29)
HCO3 BLDA-SCNC: 41.8 MMOL/L (ref 21–29)
HCT VFR BLD AUTO: 24.6 % (ref 40.5–52.5)
HGB BLD-MCNC: 8 G/DL (ref 13.5–17.5)
HGB BLDA-MCNC: 8.9 G/DL (ref 13.5–17.5)
HGB BLDA-MCNC: 9.3 G/DL (ref 13.5–17.5)
MAGNESIUM SERPL-MCNC: 1.98 MG/DL (ref 1.8–2.4)
MCH RBC QN AUTO: 29.8 PG (ref 26–34)
MCHC RBC AUTO-ENTMCNC: 32.7 G/DL (ref 31–36)
MCV RBC AUTO: 91.1 FL (ref 80–100)
METHGB MFR BLDA: 0.3 %
METHGB MFR BLDA: 0.3 %
O2 THERAPY: ABNORMAL
O2 THERAPY: ABNORMAL
PCO2 BLDA: 63 MMHG (ref 35–45)
PCO2 BLDA: 66.8 MMHG (ref 35–45)
PERFORMED ON: ABNORMAL
PERFORMED ON: NORMAL
PH BLDA: 7.41 [PH] (ref 7.35–7.45)
PH BLDA: 7.43 [PH] (ref 7.35–7.45)
PHOSPHATE SERPL-MCNC: 3 MG/DL (ref 2.5–4.9)
PLATELET # BLD AUTO: 440 K/UL (ref 135–450)
PLATELET BLD QL SMEAR: ADEQUATE
PMV BLD AUTO: 9 FL (ref 5–10.5)
PO2 BLDA: 78.4 MMHG (ref 75–108)
PO2 BLDA: 97.9 MMHG (ref 75–108)
POTASSIUM SERPL-SCNC: 3.5 MMOL/L (ref 3.5–5.1)
PROT SERPL-MCNC: 7.7 G/DL (ref 6.4–8.2)
RBC # BLD AUTO: 2.7 M/UL (ref 4.2–5.9)
SAO2 % BLDA: 95.5 %
SAO2 % BLDA: 97.3 %
SLIDE REVIEW: ABNORMAL
SODIUM SERPL-SCNC: 141 MMOL/L (ref 136–145)
WBC # BLD AUTO: 9.5 K/UL (ref 4–11)

## 2025-02-28 PROCEDURE — 2000000000 HC ICU R&B

## 2025-02-28 PROCEDURE — 6360000002 HC RX W HCPCS: Performed by: INTERNAL MEDICINE

## 2025-02-28 PROCEDURE — 6360000002 HC RX W HCPCS

## 2025-02-28 PROCEDURE — 94761 N-INVAS EAR/PLS OXIMETRY MLT: CPT

## 2025-02-28 PROCEDURE — 2500000003 HC RX 250 WO HCPCS: Performed by: INTERNAL MEDICINE

## 2025-02-28 PROCEDURE — 6370000000 HC RX 637 (ALT 250 FOR IP): Performed by: INTERNAL MEDICINE

## 2025-02-28 PROCEDURE — 2700000000 HC OXYGEN THERAPY PER DAY

## 2025-02-28 PROCEDURE — 99233 SBSQ HOSP IP/OBS HIGH 50: CPT | Performed by: NEUROMUSCULOSKELETAL MEDICINE & OMM

## 2025-02-28 PROCEDURE — 80076 HEPATIC FUNCTION PANEL: CPT

## 2025-02-28 PROCEDURE — 99232 SBSQ HOSP IP/OBS MODERATE 35: CPT | Performed by: INTERNAL MEDICINE

## 2025-02-28 PROCEDURE — 99291 CRITICAL CARE FIRST HOUR: CPT | Performed by: INTERNAL MEDICINE

## 2025-02-28 PROCEDURE — 94640 AIRWAY INHALATION TREATMENT: CPT

## 2025-02-28 PROCEDURE — 36415 COLL VENOUS BLD VENIPUNCTURE: CPT

## 2025-02-28 PROCEDURE — 71045 X-RAY EXAM CHEST 1 VIEW: CPT

## 2025-02-28 PROCEDURE — 82803 BLOOD GASES ANY COMBINATION: CPT

## 2025-02-28 PROCEDURE — 85027 COMPLETE CBC AUTOMATED: CPT

## 2025-02-28 PROCEDURE — 80048 BASIC METABOLIC PNL TOTAL CA: CPT

## 2025-02-28 PROCEDURE — 94003 VENT MGMT INPAT SUBQ DAY: CPT

## 2025-02-28 PROCEDURE — 84100 ASSAY OF PHOSPHORUS: CPT

## 2025-02-28 PROCEDURE — 2500000003 HC RX 250 WO HCPCS: Performed by: STUDENT IN AN ORGANIZED HEALTH CARE EDUCATION/TRAINING PROGRAM

## 2025-02-28 PROCEDURE — 83735 ASSAY OF MAGNESIUM: CPT

## 2025-02-28 RX ORDER — POLYETHYLENE GLYCOL 3350 17 G/17G
17 POWDER, FOR SOLUTION ORAL DAILY
Status: DISCONTINUED | OUTPATIENT
Start: 2025-02-28 | End: 2025-03-25 | Stop reason: HOSPADM

## 2025-02-28 RX ORDER — GABAPENTIN 250 MG/5ML
200 SOLUTION ORAL 2 TIMES DAILY
Status: DISCONTINUED | OUTPATIENT
Start: 2025-02-28 | End: 2025-03-10

## 2025-02-28 RX ADMIN — GABAPENTIN 200 MG: 250 SOLUTION ORAL at 08:21

## 2025-02-28 RX ADMIN — ATORVASTATIN CALCIUM 40 MG: 40 TABLET, FILM COATED ORAL at 08:22

## 2025-02-28 RX ADMIN — POTASSIUM BICARBONATE 40 MEQ: 782 TABLET, EFFERVESCENT ORAL at 11:50

## 2025-02-28 RX ADMIN — CARBOXYMETHYLCELLULOSE SODIUM 1 DROP: 10 GEL OPHTHALMIC at 08:20

## 2025-02-28 RX ADMIN — DILTIAZEM HYDROCHLORIDE 60 MG: 60 TABLET ORAL at 22:14

## 2025-02-28 RX ADMIN — METOCLOPRAMIDE 10 MG: 5 INJECTION, SOLUTION INTRAMUSCULAR; INTRAVENOUS at 08:22

## 2025-02-28 RX ADMIN — METOPROLOL TARTRATE 50 MG: 50 TABLET, FILM COATED ORAL at 20:28

## 2025-02-28 RX ADMIN — INSULIN GLARGINE 10 UNITS: 100 INJECTION, SOLUTION SUBCUTANEOUS at 20:30

## 2025-02-28 RX ADMIN — GABAPENTIN 200 MG: 250 SOLUTION ORAL at 20:29

## 2025-02-28 RX ADMIN — METOCLOPRAMIDE 10 MG: 5 INJECTION, SOLUTION INTRAMUSCULAR; INTRAVENOUS at 20:26

## 2025-02-28 RX ADMIN — METOCLOPRAMIDE 10 MG: 5 INJECTION, SOLUTION INTRAMUSCULAR; INTRAVENOUS at 01:35

## 2025-02-28 RX ADMIN — DILTIAZEM HYDROCHLORIDE 60 MG: 60 TABLET ORAL at 14:48

## 2025-02-28 RX ADMIN — METOCLOPRAMIDE 10 MG: 5 INJECTION, SOLUTION INTRAMUSCULAR; INTRAVENOUS at 14:48

## 2025-02-28 RX ADMIN — SODIUM CHLORIDE, PRESERVATIVE FREE 10 ML: 5 INJECTION INTRAVENOUS at 20:29

## 2025-02-28 RX ADMIN — ALBUTEROL SULFATE 2.5 MG: 2.5 SOLUTION RESPIRATORY (INHALATION) at 07:57

## 2025-02-28 RX ADMIN — ALBUTEROL SULFATE 2.5 MG: 2.5 SOLUTION RESPIRATORY (INHALATION) at 12:19

## 2025-02-28 RX ADMIN — POLYETHYLENE GLYCOL (3350) 17 G: 17 POWDER, FOR SOLUTION ORAL at 11:50

## 2025-02-28 RX ADMIN — Medication 200 MG: at 08:22

## 2025-02-28 RX ADMIN — ALLOPURINOL 300 MG: 300 TABLET ORAL at 08:22

## 2025-02-28 RX ADMIN — FUROSEMIDE 40 MG: 10 INJECTION, SOLUTION INTRAMUSCULAR; INTRAVENOUS at 08:22

## 2025-02-28 RX ADMIN — Medication 5000 UNITS: at 08:22

## 2025-02-28 RX ADMIN — ALBUTEROL SULFATE 2.5 MG: 2.5 SOLUTION RESPIRATORY (INHALATION) at 15:13

## 2025-02-28 RX ADMIN — INSULIN GLARGINE 10 UNITS: 100 INJECTION, SOLUTION SUBCUTANEOUS at 08:21

## 2025-02-28 RX ADMIN — CARBOXYMETHYLCELLULOSE SODIUM 1 DROP: 10 GEL OPHTHALMIC at 14:55

## 2025-02-28 RX ADMIN — ENOXAPARIN SODIUM 40 MG: 100 INJECTION SUBCUTANEOUS at 08:21

## 2025-02-28 RX ADMIN — ALBUTEROL SULFATE 2.5 MG: 2.5 SOLUTION RESPIRATORY (INHALATION) at 19:45

## 2025-02-28 RX ADMIN — METOPROLOL TARTRATE 50 MG: 50 TABLET, FILM COATED ORAL at 08:22

## 2025-02-28 RX ADMIN — Medication 750 MG: at 20:31

## 2025-02-28 RX ADMIN — PANTOPRAZOLE SODIUM 40 MG: 40 INJECTION, POWDER, LYOPHILIZED, FOR SOLUTION INTRAVENOUS at 08:22

## 2025-02-28 RX ADMIN — DILTIAZEM HYDROCHLORIDE 60 MG: 60 TABLET ORAL at 05:30

## 2025-02-28 RX ADMIN — Medication 750 MG: at 08:23

## 2025-02-28 ASSESSMENT — PULMONARY FUNCTION TESTS
PIF_VALUE: 15
PIF_VALUE: 14
PIF_VALUE: 35
PIF_VALUE: 14
PIF_VALUE: 29
PIF_VALUE: 30
PIF_VALUE: 32
PIF_VALUE: 32
PIF_VALUE: 11

## 2025-02-28 NOTE — PROGRESS NOTES
Care rounds complete with Dr. Romero. Patient to remain on SBT today r/t mentation. New medication orders entered by pharmacist. Re consult neurology for mentation and seizure activity.

## 2025-02-28 NOTE — PLAN OF CARE
Problem: Chronic Conditions and Co-morbidities  Goal: Patient's chronic conditions and co-morbidity symptoms are monitored and maintained or improved  2/27/2025 2219 by Bernardo De Leon RN  Outcome: Progressing  Flowsheets (Taken 2/27/2025 2000)  Care Plan - Patient's Chronic Conditions and Co-Morbidity Symptoms are Monitored and Maintained or Improved: Monitor and assess patient's chronic conditions and comorbid symptoms for stability, deterioration, or improvement     Problem: Discharge Planning  Goal: Discharge to home or other facility with appropriate resources  2/27/2025 2219 by Bernardo De Leon RN  Outcome: Progressing  Flowsheets (Taken 2/27/2025 2000)  Discharge to home or other facility with appropriate resources: Identify barriers to discharge with patient and caregiver     Problem: Safety - Adult  Goal: Free from fall injury  2/27/2025 2219 by Bernardo De Leon RN  Outcome: Progressing    Problem: Pain  Goal: Verbalizes/displays adequate comfort level or baseline comfort level  2/27/2025 2219 by Bernardo De Leon RN  Outcome: Progressing     Problem: Respiratory - Adult  Goal: Achieves optimal ventilation and oxygenation  2/27/2025 2219 by Bernardo De Leon RN  Outcome: Progressing  Flowsheets (Taken 2/27/2025 2000)  Achieves optimal ventilation and oxygenation:   Assess for changes in respiratory status   Assess for changes in mentation and behavior   Position to facilitate oxygenation and minimize respiratory effort   Oxygen supplementation based on oxygen saturation or arterial blood gases   Encourage broncho-pulmonary hygiene including cough, deep breathe, incentive spirometry   Assess the need for suctioning and aspirate as needed   Respiratory therapy support as indicated     Problem: Cardiovascular - Adult  Goal: Maintains optimal cardiac output and hemodynamic stability  2/27/2025 2219 by Bernardo De Leon RN  Outcome: Progressing  Flowsheets (Taken 2/27/2025 2000)  Maintains optimal cardiac

## 2025-02-28 NOTE — PROGRESS NOTES
02/27/25 2001   Patient Observation   Pulse (!) 114  (Simultaneous filing. User may not have seen previous data.)   Respirations 22  (Simultaneous filing. User may not have seen previous data.)   SpO2 98 %   Patient Observations ETT SIZE 7.5, SECURED AT 25 LIP LINE.  AMBU BAG AT HEAD OF BED.  WATER GOOD.   Breath Sounds   Right Upper Lobe Rhonchi   Right Middle Lobe Diminished   Right Lower Lobe Diminished   Left Upper Lobe Rhonchi   Left Lower Lobe Diminished   Vent Information   Vent Mode (S)  AC/VC   Ventilator Settings   FiO2  50 %   Vt (Set, mL) 400 mL   Resp Rate (Set) 22 bpm   PEEP/CPAP (cmH2O) 5   Vent Patient Data (Readings)   Vt (Measured) 397 mL   Peak Inspiratory Pressure (cmH2O) 38 cmH2O   Rate Measured 22 br/min   Minute Volume (L/min) 10.2 Liters   Peak Inspiratory Flow (lpm) 80 L/sec   Mean Airway Pressure (cmH2O) 9.7 cmH20   Plateau Pressure (cm H2O) 17 cm H2O   Driving Pressure 12   I:E Ratio 1:4.10   Flow Sensitivity 3 L/min   Static Compliance (L/cm H2O) 33   Dynamic Compliance (L/cm H2O) 12 L/cm H2O   Vent Alarm Settings   High Pressure (cmH2O) 55 cmH2O   Low Minute Volume (lpm) 4 L/min   High Minute Volume (lpm) 20 L/min   Low Exhaled Vt (ml) 250 mL   High Exhaled Vt (ml) 1000 mL   RR High (bpm) 40 br/min   Apnea (secs) 20 secs   Additional Respiratoray Assessments   Humidification Source Heated wire   Humidification Temp 37   Circuit Condensation Drained   Ambu Bag With Mask At Bedside Yes   Airway Clearance   Suction ET Tube   Suction Device Inline suction catheter   Sputum Method Obtained Endotracheal   Sputum Amount Moderate   Sputum Color/Odor White   Sputum Consistency Thick   ETT    Placement Date/Time: 02/14/25 1135   Present on Admission/Arrival: No  Placed By: RT  Placement Verified By: Auscultation;Colorimetric ETCO2 device;Chest X-ray  Preoxygenation: Yes  Airway Tube Size: 7.5 mm  Secured At: 24 cm  Measured From: Lips   Secured At 25 cm   Measured From Lips   ETT Placement (S)

## 2025-02-28 NOTE — PROGRESS NOTES
Patient assessment complete, lung sounds rhonchi upper lobes bilaterally right greater than left, diminished bases.  On ventilator, ETT tube 7.5 @ 25 lip line.  Vent settings changed from pressure support to AC/VC Rate 22//FiO2 50%/ PEEP 5.  Suctioned thick creamy secretions small to medium amount.  Bowel sounds active, on tube feeding as ordered.  Generalized edema, scleral edema/redness.  Does not move hands or feet on command but is moving legs at times.  Tracks with eyes, awake, no s/s distress noted, patient calm.  No sedation, no IVF infusing.  Turned and repositioned, bed alarm on, bilateral wrist restraints in use.  Will continue to monitor.

## 2025-02-28 NOTE — PROGRESS NOTES
02/28/25 0254   Patient Observation   Pulse (!) 105   Respirations 21   SpO2 97 %   Patient Observations 7.5 ett 25 at lip   Breath Sounds   Right Upper Lobe Rhonchi   Right Middle Lobe Rhonchi   Right Lower Lobe Diminished   Left Upper Lobe Rhonchi   Left Lower Lobe Diminished   Vent Information   Vent Mode AC/VC   Ventilator Settings   FiO2  50 %   Vt (Set, mL) 400 mL   Resp Rate (Set) 22 bpm   PEEP/CPAP (cmH2O) 5   Vent Patient Data (Readings)   Vt (Measured) 452 mL   Peak Inspiratory Pressure (cmH2O) 32 cmH2O   Rate Measured 22 br/min   Minute Volume (L/min) 10.3 Liters   Mean Airway Pressure (cmH2O) 10 cmH20   Plateau Pressure (cm H2O) 16 cm H2O   Driving Pressure 11   I:E Ratio 1:4.10   Flow Sensitivity 3 L/min   Vent Alarm Settings   High Pressure (cmH2O) 55 cmH2O   Low Minute Volume (lpm) 4 L/min   Low Exhaled Vt (ml) 250 mL   RR High (bpm) 40 br/min   Apnea (secs) 20 secs   Additional Respiratoray Assessments   Humidification Source Heated wire   Humidification Temp 37   Circuit Condensation Drained   Ambu Bag With Mask At Bedside Yes   Airway Clearance   Suction ET Tube   Suction Device Inline suction catheter   Sputum Method Obtained Endotracheal   Sputum Amount Small   Sputum Color/Odor White   Sputum Consistency Thick   ETT    Placement Date/Time: 02/14/25 1135   Present on Admission/Arrival: No  Placed By: RT  Placement Verified By: Auscultation;Colorimetric ETCO2 device;Chest X-ray  Preoxygenation: Yes  Airway Tube Size: 7.5 mm  Secured At: 24 cm  Measured From: Lips   Secured At 25 cm   Measured From Lips   ETT Placement Center   Secured By Commercial tube may

## 2025-02-28 NOTE — PROGRESS NOTES
02/28/25 0801   Patient Observation   Pulse (!) 117   SpO2 98 %   Vent Information   Vent Mode CPAP/PS   Ventilator Settings   FiO2  50 %   PEEP/CPAP (cmH2O) 5   Pressure Support (cm H2O) 5 cm H2O   Vent Patient Data (Readings)   Vt (Measured) 343 mL   Peak Inspiratory Pressure (cmH2O) 11 cmH2O   Rate Measured 27 br/min   Minute Volume (L/min) 9.04 Liters   Mean Airway Pressure (cmH2O) 6.9 cmH20   I:E Ratio 1:2.80

## 2025-02-28 NOTE — PROGRESS NOTES
Neurology consult sent via perfect serve, Electronically signed by Bruno Harley on 2/28/2025 at 10:29 AM

## 2025-02-28 NOTE — CONSULTS
Neurology Progress Note       Name:  Sandor Early  MRN:    [id number]  Date of Service: 2/28/2025     Referring Provider:  Philip Romero MD        Request for a neurological follow up by ICU staff today for decreased mentation and prior history of seizure activity.      The patient has been seen by neurology team several times over the past 2 weeks for   reported seizures, encephalopathy and change of mental status.    Has  history of childhood epilepsy. And was noted to have Seizure-like activity (bilateral shaking with retained awareness) and encephalopathy in the setting of alcoholic cirrhosis, alcohol withdrawal, acute on chronic respiratory failure.  He is off sedation now since yesterday and slow to waking up  No recent seizures noted by ICU staff however       His first neurological evaluation was on February 7, 2025 after the patient was noted by physical therapy to have seizure-like activity for about 2 minutes as he was sitting at EOB when he developed suddenly started with seizure-like tremors in bilat UEs, trunk, and head. He stated that he was \"having another seizure\". Writer assisted him back into bed quickly and patient cont with tremors and closed his eyes. He responded at all times to strong verbal cues.     He had another seizure-like event 2/7 evening and afterwards was able to voice demands.     Started on  Keppra 2000 mg IV bolus then 1000 mg IV twice daily maintenance dose.  He is having GI issues now so once those resolve then he can switch from IV to p.o.  On 2/13 Switch levetiracetam to zonisamide to lower the risk of behavioral disturbance.   Then again back on Levetiracetam Via NG tube 750 mg bd  And  gabapentin 200 mg tid     Last documented seizure-like episode was on 2/7.       Hospital course has been further complicated by acute respiratory failure requiring BiPAP then later weaned to nasal cannula oxygen; enteritis with partial small bowel obstruction treated with nasogastric

## 2025-02-28 NOTE — PROGRESS NOTES
Reassessment complete.    Patient continues intubated and on SBT. No changed to physical assessment. Patient repositioned for comfort with bilateral soft wrist restraints in place.

## 2025-02-28 NOTE — PROGRESS NOTES
02/27/25 2329   Patient Observation   Pulse 92   Respirations 23   SpO2 97 %   Patient Observations ETT SIZE 7.5, SECURED AT 25 LIP LINE.  AMBU BAG AT HEAD OF BED.  WATER GOOD.   Breath Sounds   Right Upper Lobe Rhonchi   Right Middle Lobe Diminished   Right Lower Lobe Diminished   Left Upper Lobe Rhonchi   Left Lower Lobe Diminished   Ventilator Settings   FiO2  50 %   Vt (Set, mL) 400 mL   Resp Rate (Set) 22 bpm   PEEP/CPAP (cmH2O) 5   Vent Patient Data (Readings)   Vt (Measured) 462 mL   Peak Inspiratory Pressure (cmH2O) 28 cmH2O   Rate Measured 23 br/min   Minute Volume (L/min) 10.6 Liters   Peak Inspiratory Flow (lpm) 80 L/sec   Mean Airway Pressure (cmH2O) 9.4 cmH20   Plateau Pressure (cm H2O) 16 cm H2O   Driving Pressure 11   I:E Ratio 1:3.80   Flow Sensitivity 3 L/min   Vent Alarm Settings   High Pressure (cmH2O) 55 cmH2O   Low Minute Volume (lpm) 4 L/min   High Minute Volume (lpm) 20 L/min   Low Exhaled Vt (ml) 250 mL   High Exhaled Vt (ml) 1000 mL   RR High (bpm) 40 br/min   Apnea (secs) 20 secs   Additional Respiratoray Assessments   Humidification Source Heated wire   Humidification Temp 36.9   Circuit Condensation Drained   Ambu Bag With Mask At Bedside Yes   Airway Clearance   Suction ET Tube   Suction Device Inline suction catheter   Sputum Method Obtained Endotracheal   Sputum Amount Small   Sputum Color/Odor White   Sputum Consistency Thick   ETT    Placement Date/Time: 02/14/25 1135   Present on Admission/Arrival: No  Placed By: RT  Placement Verified By: Auscultation;Colorimetric ETCO2 device;Chest X-ray  Preoxygenation: Yes  Airway Tube Size: 7.5 mm  Secured At: 24 cm  Measured From: Lips   Secured At 25 cm   Measured From Lips   ETT Placement (S)  Right   Secured By Commercial tube may   Site Assessment Dry

## 2025-02-28 NOTE — PROGRESS NOTES
Reassessment complete, patient alert, follows with eyes, NSR on monitor, Rhonchi bilateral upper lobes, moderate secretions when suctioned orally and per ETT.  No sedation, no s/s distress, will continue to monitor.

## 2025-02-28 NOTE — PROGRESS NOTES
Patient reassessment complete, lung sounds rhonchi upper lobes, diminished bilateral lower lobes.  No change to vent settings, RASS 0, no sedation, bilateral wrist restraints in use.  Tolerating tube feeding, Bowel sounds active.  CXR complete, am labs/ABG done, repositioned.  No s/s distress, will continue to monitor.

## 2025-02-28 NOTE — PROGRESS NOTES
Reassessment complete.    Patient continues intubated and on SBT. Physical assessment unchanged. Patient repositioned for comfort. Bilateral soft wrist restraints remain in place.

## 2025-02-28 NOTE — PROGRESS NOTES
Shift assessment complete.    Patient seen intubated. Patient is awake and following commands AEB head nodding appropriately and squeezing right hand on command. Patient intubated with a # 7.5 ETT at the 25 LL. Patient currently on SBT per RT. Patient with no s/s of pain. Patient with no s/s of distress noted.    Physical assessment as charted in flow sheets.    Scheduled medications given per orders.    Peripheral IV C/D/I and functioning properly.    NG intact and secure, tube feed off for SBT.    Castrejon intact and secure, clear yellow urine draining.    Patient repositioned for comfort. Bilateral soft wrist restraints in place for patient safety and the safety of all lines and tubes.

## 2025-02-28 NOTE — PROGRESS NOTES
Pulmonary & Critical Care Medicine ICU Progress Note    CC: COPD respiratory failure    Events of Last 24 hours:   TPN started -    Propofol off   Precedex 0.7 ---> 0 mcg/kg/hr   PEEP 5  FiO2 50%  Did well  all day yesterday   This am doing well on          Vascular lines: IV: Left IJ -. R Midline     MV:   Vent Mode: AC/VC Resp Rate (Set): 22 bpm/Vt (Set, mL): 400 mL/ /FiO2 : 50 %  Recent Labs     25  0446 25  0428   PHART 7.462* 7.414   BVT3MQU 60.3* 66.8*   PO2ART 78.5 97.9       IV:   sodium chloride      sodium chloride      dexmedeTOMIDine HCl in NaCl Stopped (25 0246)    sodium chloride      dextrose         Vitals:  Blood pressure 124/63, pulse (!) 114, temperature 100 °F (37.8 °C), temperature source Bladder, resp. rate 28, height 1.702 m (5' 7.01\"), weight 89.1 kg (196 lb 6.9 oz), SpO2 95%.  on   Temp  Av.5 °F (37.5 °C)  Min: 98.7 °F (37.1 °C)  Max: 100 °F (37.8 °C)    Intake/Output Summary (Last 24 hours) at 2025 0739  Last data filed at 2025 0409  Gross per 24 hour   Intake 610.56 ml   Output 3150 ml   Net -2539.44 ml     EXAM:  General: ill appearing.  Intubated.  Eyes: No sclera icterus. No conjunctival injection.  ENT: No discharge. ET tube in place  Neck: Trachea midline.   Resp: No accessory muscle use. Few crackles. No wheezing. + rhonchi.   CV: Regular  rate. Regular rhythm. No mumur or rub. + LE edema.   GI: Non-tender. Non-distended.   Skin: Warm and dry. No rash on exposed extremities.  M/S: No cyanosis. No clubbing.   Neuro: Intubated. + following commands.  Good cough.   Psych: Noncommunicative unable to obtain        Scheduled Meds:   furosemide  40 mg IntraVENous Daily    insulin glargine  10 Units SubCUTAneous BID    levETIRAcetam  750 mg Oral BID    thiamine  200 mg Oral Daily    sodium chloride flush  5-40 mL IntraVENous 2 times per day    lidocaine 1 % injection  50 mg IntraDERmal Once    metoclopramide  10 mg

## 2025-02-28 NOTE — PROGRESS NOTES
Progress Note    Admit Date:  1/30/2025    Interval history:  Influenza A and Acute respiratory failure   Was on bipap in iCU and improved, transferred to [facility]        Has NG placed for Ileus  , he pulled out NG and likely has aspirated became hypoxic and transferred to ICU      Seizure like event 2/7,   Remains confused  MRI brain incomplete .     2/13   Mentation worse today  On precedex   On 4 L of O2     2/21  Ct with resolving ileus  Started on TF @15 cc   Started weaning sedation  Repeat CT abd with no acute findings     2/22  S/p bronch yesterday.  Sedated on vent.    Subjective:  Mr. Early is off sedation. Off precedex. Can follow simple commands. Tolerating PS in am and AC at HS. PS is being weaned down. No distress. Tolerated PS for most of the day yesterday.    Objective:   Patient Vitals for the past 4 hrs:   BP Temp Temp src Pulse Resp SpO2   02/28/25 0822 (!) 141/68 -- -- (!) 126 -- --   02/28/25 0801 -- -- -- (!) 117 26 98 %   02/28/25 0800 (!) 141/68 -- -- (!) 119 21 99 %   02/28/25 0757 -- -- -- (!) 117 27 96 %   02/28/25 0700 124/63 100 °F (37.8 °C) Bladder (!) 114 28 95 %   02/28/25 0600 129/64 -- -- (!) 116 22 96 %   02/28/25 0500 120/62 -- -- (!) 111 22 96 %          Intake/Output Summary (Last 24 hours) at 2/28/2025 0835  Last data filed at 2/28/2025 0819  Gross per 24 hour   Intake 779.56 ml   Output 3375 ml   Net -2595.44 ml       Physical Exam:  General: more awake and alert. Appears calm. Off sedation.  Oral ETT and OG noted  Left IJ TLC . Appears to be not in any distress  Mucous Membranes:  Pink , anicteric  Neck: No JVD, no carotid bruit, no thyromegaly  Chest:  Clear to auscultation bilaterally, resolved wheeze  Cardiovascular:  RRR S1S2 heard, no murmurs or gallops  Abdomen:  Soft, obese +distended, non tender, no organomegaly, BS absent  Extremities: trace edema to ext improved   Distal pulses well felt  Neurological : Open eyes follow simple commands     cations:  furosemide, 40      POSITIVE for  Normal Range: Not detected  CONTACT PRECAUTIONS INDICATED      Narrative:      ORDER#: [id number]                          ORDERED BY: BARBY SANTIAGO  SOURCE: [facility]                              COLLECTED:  02/15/25 15:03  ANTIBIOTICS AT [facility].:                      RECEIVED :  02/16/25 01:54  CALL  [place]  [facility] tel. [phone],  Previous panic on this admission - call not needed per SOP, 02/16/2025 22:22,  by [facility]               RADIOLOGY  XR CHEST PORTABLE   Final Result   No evidence of acute cardiopulmonary process.  Stable positioning of support   apparatus.         Vascular duplex upper extremity venous bilateral   Final Result      XR CHEST PORTABLE   Final Result   Minimal vascular congestion with a small left pleural effusion.         XR CHEST PORTABLE   Final Result   No evidence of acute cardiopulmonary process.         XR CHEST PORTABLE   Final Result   No significant finding in the chest.         XR ABDOMEN FOR NG/OG/NE TUBE PLACEMENT   Final Result   Nasogastric tube with tip overlying gastric antrum.         XR CHEST PORTABLE   Final Result   1.  Lines and tubes as above.  The NG side port is near the GE junction and   could be advanced by 5-10 cm.      2.  Interstitial opacities in both lower lungs are redemonstrated.         XR CHEST PORTABLE   Final Result   Bibasilar opacities are redemonstrated.      Endotracheal tube, nasogastric tube, and left jugular line are stable.         XR CHEST PORTABLE   Final Result   Increasing basilar opacities may reflect developing infiltrate/effusions.   Study is somewhat limited secondary to rotation but no other significant   change identified.         XR CHEST PORTABLE   Final Result   Interstitial and hazy opacities in the right lower lung more than left are   still present.      Endotracheal tube, nasogastric tube, and left jugular line.         XR CHEST PORTABLE   Final Result   Overall stable chest without significant interval change.         XR

## 2025-02-28 NOTE — PROGRESS NOTES
4 Eyes Skin Assessment     NAME:  Sandor Early  YOB: 1955  MEDICAL RECORD NUMBER:  [id number]    The patient is being assessed for  Other bathing.    I agree that at least one RN has performed a thorough Head to Toe Skin Assessment on the patient. ALL assessment sites listed below have been assessed.      Areas assessed by both nurses:  Bernardo Shi and Kylie MEEKS    Head, Face, Ears, Shoulders, Back, Chest, Arms, Elbows, Hands, Sacrum. Buttock, Coccyx, Ischium, Legs. Feet and Heels, and Under Medical Devices         Does the Patient have a Wound? No noted wound(s)  Scattered bruising       Abraham Prevention initiated by RN: Yes  Wound Care Orders initiated by RN: No    Pressure Injury (Stage 3,4, Unstageable, DTI, NWPT, and Complex wounds) if present, place Wound referral order by RN under : No    New Ostomies, if present place, Ostomy referral order under : No     Nurse 1 eSignature: Electronically signed by Bernardo De Leon RN on 2/28/25 at 2:31 AM EST    **SHARE this note so that the co-signing nurse can place an eSignature**    Nurse 2 eSignature: {Esignature:[id number]}

## 2025-03-01 ENCOUNTER — APPOINTMENT (OUTPATIENT)
Dept: GENERAL RADIOLOGY | Age: 70
DRG: 207 | End: 2025-03-01
Payer: MEDICARE

## 2025-03-01 LAB
ANION GAP SERPL CALCULATED.3IONS-SCNC: 8 MMOL/L (ref 3–16)
BASE EXCESS BLDA CALC-SCNC: 14 MMOL/L (ref -3–3)
BUN SERPL-MCNC: 15 MG/DL (ref 7–20)
CALCIUM SERPL-MCNC: 8.4 MG/DL (ref 8.3–10.6)
CHLORIDE SERPL-SCNC: 97 MMOL/L (ref 99–110)
CO2 BLDA-SCNC: 41.9 MMOL/L
CO2 SERPL-SCNC: 37 MMOL/L (ref 21–32)
COHGB MFR BLDA: 0.2 % (ref 0–1.5)
CREAT SERPL-MCNC: 0.7 MG/DL (ref 0.8–1.3)
DEPRECATED RDW RBC AUTO: 16.8 % (ref 12.4–15.4)
GFR SERPLBLD CREATININE-BSD FMLA CKD-EPI: >90 ML/MIN/{1.73_M2}
GLUCOSE BLD-MCNC: 106 MG/DL (ref 70–99)
GLUCOSE BLD-MCNC: 108 MG/DL (ref 70–99)
GLUCOSE BLD-MCNC: 114 MG/DL (ref 70–99)
GLUCOSE BLD-MCNC: 115 MG/DL (ref 70–99)
GLUCOSE BLD-MCNC: 132 MG/DL (ref 70–99)
GLUCOSE BLD-MCNC: 98 MG/DL (ref 70–99)
GLUCOSE SERPL-MCNC: 101 MG/DL (ref 70–99)
HCO3 BLDA-SCNC: 40 MMOL/L (ref 21–29)
HCT VFR BLD AUTO: 26.4 % (ref 40.5–52.5)
HGB BLD-MCNC: 8.8 G/DL (ref 13.5–17.5)
HGB BLDA-MCNC: 9.2 G/DL (ref 13.5–17.5)
MAGNESIUM SERPL-MCNC: 1.96 MG/DL (ref 1.8–2.4)
MCH RBC QN AUTO: 30.3 PG (ref 26–34)
MCHC RBC AUTO-ENTMCNC: 33.3 G/DL (ref 31–36)
MCV RBC AUTO: 91.1 FL (ref 80–100)
METHGB MFR BLDA: 0.1 %
O2 THERAPY: ABNORMAL
PCO2 BLDA: 60.4 MMHG (ref 35–45)
PERFORMED ON: ABNORMAL
PERFORMED ON: NORMAL
PH BLDA: 7.44 [PH] (ref 7.35–7.45)
PHOSPHATE SERPL-MCNC: 3.6 MG/DL (ref 2.5–4.9)
PLATELET # BLD AUTO: 504 K/UL (ref 135–450)
PMV BLD AUTO: 7.9 FL (ref 5–10.5)
PO2 BLDA: 77.9 MMHG (ref 75–108)
POTASSIUM SERPL-SCNC: 3.6 MMOL/L (ref 3.5–5.1)
RBC # BLD AUTO: 2.9 M/UL (ref 4.2–5.9)
SAO2 % BLDA: 95.6 %
SODIUM SERPL-SCNC: 142 MMOL/L (ref 136–145)
WBC # BLD AUTO: 7.6 K/UL (ref 4–11)

## 2025-03-01 PROCEDURE — 6360000002 HC RX W HCPCS: Performed by: INTERNAL MEDICINE

## 2025-03-01 PROCEDURE — 80048 BASIC METABOLIC PNL TOTAL CA: CPT

## 2025-03-01 PROCEDURE — 6370000000 HC RX 637 (ALT 250 FOR IP): Performed by: INTERNAL MEDICINE

## 2025-03-01 PROCEDURE — 2700000000 HC OXYGEN THERAPY PER DAY

## 2025-03-01 PROCEDURE — 94003 VENT MGMT INPAT SUBQ DAY: CPT

## 2025-03-01 PROCEDURE — 83735 ASSAY OF MAGNESIUM: CPT

## 2025-03-01 PROCEDURE — 85027 COMPLETE CBC AUTOMATED: CPT

## 2025-03-01 PROCEDURE — 2500000003 HC RX 250 WO HCPCS: Performed by: INTERNAL MEDICINE

## 2025-03-01 PROCEDURE — 84100 ASSAY OF PHOSPHORUS: CPT

## 2025-03-01 PROCEDURE — 71045 X-RAY EXAM CHEST 1 VIEW: CPT

## 2025-03-01 PROCEDURE — 94761 N-INVAS EAR/PLS OXIMETRY MLT: CPT

## 2025-03-01 PROCEDURE — 36415 COLL VENOUS BLD VENIPUNCTURE: CPT

## 2025-03-01 PROCEDURE — 82803 BLOOD GASES ANY COMBINATION: CPT

## 2025-03-01 PROCEDURE — 99291 CRITICAL CARE FIRST HOUR: CPT | Performed by: INTERNAL MEDICINE

## 2025-03-01 PROCEDURE — 2000000000 HC ICU R&B

## 2025-03-01 PROCEDURE — 6360000002 HC RX W HCPCS

## 2025-03-01 PROCEDURE — 94640 AIRWAY INHALATION TREATMENT: CPT

## 2025-03-01 PROCEDURE — 99233 SBSQ HOSP IP/OBS HIGH 50: CPT | Performed by: INTERNAL MEDICINE

## 2025-03-01 RX ORDER — POTASSIUM CHLORIDE 1.5 G/1.58G
40 POWDER, FOR SOLUTION ORAL ONCE
Status: DISCONTINUED | OUTPATIENT
Start: 2025-03-01 | End: 2025-03-01

## 2025-03-01 RX ORDER — QUETIAPINE FUMARATE 25 MG/1
25 TABLET, FILM COATED ORAL 2 TIMES DAILY
Status: DISCONTINUED | OUTPATIENT
Start: 2025-03-01 | End: 2025-03-02

## 2025-03-01 RX ADMIN — ATORVASTATIN CALCIUM 40 MG: 40 TABLET, FILM COATED ORAL at 08:16

## 2025-03-01 RX ADMIN — FUROSEMIDE 40 MG: 10 INJECTION, SOLUTION INTRAMUSCULAR; INTRAVENOUS at 08:16

## 2025-03-01 RX ADMIN — ALBUTEROL SULFATE 2.5 MG: 2.5 SOLUTION RESPIRATORY (INHALATION) at 15:15

## 2025-03-01 RX ADMIN — METOCLOPRAMIDE 10 MG: 5 INJECTION, SOLUTION INTRAMUSCULAR; INTRAVENOUS at 20:36

## 2025-03-01 RX ADMIN — Medication 750 MG: at 20:40

## 2025-03-01 RX ADMIN — Medication 200 MG: at 08:15

## 2025-03-01 RX ADMIN — Medication 750 MG: at 08:17

## 2025-03-01 RX ADMIN — ALBUTEROL SULFATE 2.5 MG: 2.5 SOLUTION RESPIRATORY (INHALATION) at 11:40

## 2025-03-01 RX ADMIN — ALBUTEROL SULFATE 2.5 MG: 2.5 SOLUTION RESPIRATORY (INHALATION) at 07:36

## 2025-03-01 RX ADMIN — INSULIN GLARGINE 10 UNITS: 100 INJECTION, SOLUTION SUBCUTANEOUS at 08:17

## 2025-03-01 RX ADMIN — INSULIN GLARGINE 10 UNITS: 100 INJECTION, SOLUTION SUBCUTANEOUS at 20:41

## 2025-03-01 RX ADMIN — METOCLOPRAMIDE 10 MG: 5 INJECTION, SOLUTION INTRAMUSCULAR; INTRAVENOUS at 08:16

## 2025-03-01 RX ADMIN — Medication 5000 UNITS: at 08:16

## 2025-03-01 RX ADMIN — ENOXAPARIN SODIUM 40 MG: 100 INJECTION SUBCUTANEOUS at 08:16

## 2025-03-01 RX ADMIN — CARBOXYMETHYLCELLULOSE SODIUM 1 DROP: 10 GEL OPHTHALMIC at 08:15

## 2025-03-01 RX ADMIN — DILTIAZEM HYDROCHLORIDE 60 MG: 60 TABLET ORAL at 14:24

## 2025-03-01 RX ADMIN — QUETIAPINE FUMARATE 25 MG: 25 TABLET ORAL at 20:35

## 2025-03-01 RX ADMIN — PANTOPRAZOLE SODIUM 40 MG: 40 INJECTION, POWDER, LYOPHILIZED, FOR SOLUTION INTRAVENOUS at 08:16

## 2025-03-01 RX ADMIN — MIDAZOLAM HYDROCHLORIDE 2 MG: 1 INJECTION, SOLUTION INTRAMUSCULAR; INTRAVENOUS at 01:19

## 2025-03-01 RX ADMIN — POLYETHYLENE GLYCOL (3350) 17 G: 17 POWDER, FOR SOLUTION ORAL at 08:15

## 2025-03-01 RX ADMIN — METOCLOPRAMIDE 10 MG: 5 INJECTION, SOLUTION INTRAMUSCULAR; INTRAVENOUS at 02:19

## 2025-03-01 RX ADMIN — GABAPENTIN 200 MG: 250 SOLUTION ORAL at 08:15

## 2025-03-01 RX ADMIN — POTASSIUM BICARBONATE 40 MEQ: 782 TABLET, EFFERVESCENT ORAL at 11:11

## 2025-03-01 RX ADMIN — DEXMEDETOMIDINE HYDROCHLORIDE 0.2 MCG/KG/HR: 4 INJECTION, SOLUTION INTRAVENOUS at 08:36

## 2025-03-01 RX ADMIN — ALLOPURINOL 300 MG: 300 TABLET ORAL at 08:15

## 2025-03-01 RX ADMIN — QUETIAPINE FUMARATE 25 MG: 25 TABLET ORAL at 11:11

## 2025-03-01 RX ADMIN — DILTIAZEM HYDROCHLORIDE 60 MG: 60 TABLET ORAL at 20:36

## 2025-03-01 RX ADMIN — METOCLOPRAMIDE 10 MG: 5 INJECTION, SOLUTION INTRAMUSCULAR; INTRAVENOUS at 14:24

## 2025-03-01 RX ADMIN — DEXMEDETOMIDINE HYDROCHLORIDE 0.4 MCG/KG/HR: 4 INJECTION, SOLUTION INTRAVENOUS at 18:44

## 2025-03-01 RX ADMIN — DILTIAZEM HYDROCHLORIDE 60 MG: 60 TABLET ORAL at 05:40

## 2025-03-01 RX ADMIN — METOPROLOL TARTRATE 50 MG: 50 TABLET, FILM COATED ORAL at 23:55

## 2025-03-01 RX ADMIN — MIDAZOLAM HYDROCHLORIDE 2 MG: 1 INJECTION, SOLUTION INTRAMUSCULAR; INTRAVENOUS at 11:02

## 2025-03-01 RX ADMIN — SODIUM CHLORIDE, PRESERVATIVE FREE 10 ML: 5 INJECTION INTRAVENOUS at 20:36

## 2025-03-01 RX ADMIN — METOPROLOL TARTRATE 50 MG: 50 TABLET, FILM COATED ORAL at 08:15

## 2025-03-01 RX ADMIN — ALBUTEROL SULFATE 2.5 MG: 2.5 SOLUTION RESPIRATORY (INHALATION) at 19:24

## 2025-03-01 RX ADMIN — GABAPENTIN 200 MG: 250 SOLUTION ORAL at 20:35

## 2025-03-01 RX ADMIN — MIDAZOLAM HYDROCHLORIDE 2 MG: 1 INJECTION, SOLUTION INTRAMUSCULAR; INTRAVENOUS at 15:11

## 2025-03-01 ASSESSMENT — PULMONARY FUNCTION TESTS
PIF_VALUE: 28
PIF_VALUE: 27
PIF_VALUE: 31
PIF_VALUE: 27
PIF_VALUE: 28
PIF_VALUE: 31

## 2025-03-01 NOTE — PROGRESS NOTES
Reassessment complete.    Patient continues intubated and sedated. RT at bedside and terminating SBT r/t desaturation. Physical assessment remains unchanged. Patient repositioned for comfort. Bilateral soft wrist restraints remain in place.    Precedex infusing at 0.4 mcg/kg/h.

## 2025-03-01 NOTE — PROGRESS NOTES
Patient awake, moving arms and legs more, agitated, shaking head, restless, versed PRN dose given, patient remains awake, bath complete, secretions moderate oral and per ETT, suctioned several times through bathing, calms when done, mouth care complete, will continue to monitor, no continuous sedation.

## 2025-03-01 NOTE — PROGRESS NOTES
Patient assessment complete, awake, calm, lung sounds clear diminished in bases, moderate secretions when suctioned per ETT.  Intubated on ventilator, changed from pressure support to AC/VC, rate 22// FiO2 50%/PEEP 5.  ETT 7.5 @ 25cm lip line.    Bowel sounds active, tube feeding infusing as ordered, last BM 2/24, given stool softener today. Castrejon catheter urine yellow clear.    Edema improving, RUE small area 2+, left arm +1.  Patient follows some commands, weak hand grasps, does not move feet when prompted nor to stimulation but will move legs when range of motion practiced.  Tremors noted bilateral arms and occasionally to head, stops when RN tries to calm patient.    Will continue to monitor, call light in reach.  Oral care complete.

## 2025-03-01 NOTE — PROGRESS NOTES
03/01/25 0306   Patient Observation   Pulse (!) 107   Respirations 22   SpO2 96 %   Patient Observations 7.5 ett 25 at lip   Breath Sounds   Right Upper Lobe Rhonchi   Right Middle Lobe Diminished   Right Lower Lobe Diminished   Left Upper Lobe Rhonchi   Left Lower Lobe Diminished   Vent Information   Vent Mode AC/VC   Ventilator Settings   FiO2  50 %   Vt (Set, mL) 400 mL   Resp Rate (Set) 22 bpm   PEEP/CPAP (cmH2O) 5   Vent Patient Data (Readings)   Vt (Measured) 468 mL   Peak Inspiratory Pressure (cmH2O) 31 cmH2O   Rate Measured 22 br/min   Minute Volume (L/min) 9.99 Liters   Mean Airway Pressure (cmH2O) 10 cmH20   Plateau Pressure (cm H2O) 17 cm H2O   Driving Pressure 12   I:E Ratio 1:4.10   Flow Sensitivity 3 L/min   Vent Alarm Settings   High Pressure (cmH2O) 55 cmH2O   Low Minute Volume (lpm) 4 L/min   Low Exhaled Vt (ml) 250 mL   RR High (bpm) 40 br/min   Apnea (secs) 20 secs   Additional Respiratoray Assessments   Humidification Source Heated wire   Humidification Temp 35.7   Circuit Condensation Drained   Ambu Bag With Mask At Bedside Yes   Airway Clearance   Suction ET Tube   Suction Device Inline suction catheter   Sputum Method Obtained Endotracheal   Sputum Amount Moderate   Sputum Color/Odor White   Sputum Consistency Thick   ETT    Placement Date/Time: 02/14/25 1135   Present on Admission/Arrival: No  Placed By: RT  Placement Verified By: Auscultation;Colorimetric ETCO2 device;Chest X-ray  Preoxygenation: Yes  Airway Tube Size: 7.5 mm  Secured At: 24 cm  Measured From: Lips   Secured At 25 cm   Measured From Lips   ETT Placement Center  (moved to center)   Secured By Commercial tube may

## 2025-03-01 NOTE — PROGRESS NOTES
02/28/25 1947   Patient Observation   Pulse (!) 128   Respirations (!) 31   SpO2 96 %   Patient Observations ETT SIZE 7.5, SECURED AT 25 LIP LINE.  AMBU BAG AT EHAD OF BED.  WATER GOOD.   Breath Sounds   Right Upper Lobe Rhonchi   Right Middle Lobe Rhonchi   Right Lower Lobe Diminished   Left Upper Lobe Rhonchi   Left Lower Lobe Diminished   Vent Information   Vent Mode (S)  AC/VC   Ventilator Settings   FiO2  50 %   Vt (Set, mL) 400 mL   Resp Rate (Set) 22 bpm   PEEP/CPAP (cmH2O) 5   Vent Patient Data (Readings)   Vt (Measured) 397 mL   Peak Inspiratory Pressure (cmH2O) 35 cmH2O   Rate Measured 27 br/min   Minute Volume (L/min) 11.3 Liters   Peak Inspiratory Flow (lpm) 80 L/sec   Mean Airway Pressure (cmH2O) 11 cmH20   Plateau Pressure (cm H2O) 17 cm H2O   Driving Pressure 12   I:E Ratio 1:2.70   Flow Sensitivity 3 L/min   Static Compliance (L/cm H2O) 33   Dynamic Compliance (L/cm H2O) 18 L/cm H2O   Vent Alarm Settings   High Pressure (cmH2O) 55 cmH2O   Low Minute Volume (lpm) 4 L/min   High Minute Volume (lpm) 20 L/min   Low Exhaled Vt (ml) 250 mL   High Exhaled Vt (ml) 1000 mL   RR High (bpm) 40 br/min   Apnea (secs) 20 secs   Additional Respiratoray Assessments   Humidification Source Heated wire   Humidification Temp 37   Circuit Condensation Drained   Ambu Bag With Mask At Bedside Yes   Airway Clearance   Suction ET Tube   Suction Device Inline suction catheter   Sputum Method Obtained Endotracheal   Sputum Amount Moderate   Sputum Color/Odor White   Sputum Consistency Thick   ETT    Placement Date/Time: 02/14/25 1135   Present on Admission/Arrival: No  Placed By: RT  Placement Verified By: Auscultation;Colorimetric ETCO2 device;Chest X-ray  Preoxygenation: Yes  Airway Tube Size: 7.5 mm  Secured At: 24 cm  Measured From: Lips   Secured At 25 cm   Measured From Lips   ETT Placement (S)  Center   Secured By Commercial tube may   Site Assessment Dry

## 2025-03-01 NOTE — PROGRESS NOTES
4 Eyes Skin Assessment     NAME:  Sandor Early  YOB: 1955  MEDICAL RECORD NUMBER:  [id number]    The patient is being assessed for  Other bathing    I agree that at least one RN has performed a thorough Head to Toe Skin Assessment on the patient. ALL assessment sites listed below have been assessed.      Areas assessed by both nurses:    Head, Face, Ears, Shoulders, Back, Chest, Arms, Elbows, Hands, Sacrum. Buttock, Coccyx, Ischium, Legs. Feet and Heels, and Under Medical Devices         Does the Patient have a Wound? No noted wound(s)  Scattered bruising.        Abraham Prevention initiated by RN: Yes  Wound Care Orders initiated by RN: No    Pressure Injury (Stage 3,4, Unstageable, DTI, NWPT, and Complex wounds) if present, place Wound referral order by RN under : No    New Ostomies, if present place, Ostomy referral order under : No     Nurse 1 eSignature: Electronically signed by Bernardo De Leon RN on 3/1/25 at 5:22 AM EST    **SHARE this note so that the co-signing nurse can place an eSignature**    Nurse 2 eSignature: Electronically signed by Conrad Gage RN on 3/1/25 at 5:24 AM EST

## 2025-03-01 NOTE — PROGRESS NOTES
02/28/25 2254   Patient Observation   Pulse 98   Respirations 21   SpO2 98 %   Patient Observations 7.5 ett 25 at lip   Breath Sounds   Right Upper Lobe Rhonchi   Right Middle Lobe Diminished   Right Lower Lobe Diminished   Left Upper Lobe Rhonchi   Left Lower Lobe Diminished   Vent Information   Vent Mode AC/VC   Ventilator Settings   FiO2  50 %   Vt (Set, mL) 400 mL   Resp Rate (Set) 22 bpm   PEEP/CPAP (cmH2O) 5   Vent Patient Data (Readings)   Vt (Measured) 485 mL   Peak Inspiratory Pressure (cmH2O) 32 cmH2O   Rate Measured 24 br/min   Minute Volume (L/min) 10.9 Liters   Mean Airway Pressure (cmH2O) 11 cmH20   Plateau Pressure (cm H2O) 17 cm H2O   Driving Pressure 12   I:E Ratio 1:4.10   Flow Sensitivity 3 L/min   Vent Alarm Settings   High Pressure (cmH2O) 55 cmH2O   Low Minute Volume (lpm) 4 L/min   Low Exhaled Vt (ml) 250 mL   RR High (bpm) 40 br/min   Apnea (secs) 20 secs   Additional Respiratoray Assessments   Humidification Source Heated wire   Humidification Temp 36.9   Circuit Condensation Drained   Ambu Bag With Mask At Bedside Yes   Airway Clearance   Suction ET Tube   Suction Device Inline suction catheter   Sputum Method Obtained Endotracheal   Sputum Amount Moderate   Sputum Color/Odor White   Sputum Consistency Thick   ETT    Placement Date/Time: 02/14/25 1135   Present on Admission/Arrival: No  Placed By: RT  Placement Verified By: Auscultation;Colorimetric ETCO2 device;Chest X-ray  Preoxygenation: Yes  Airway Tube Size: 7.5 mm  Secured At: 24 cm  Measured From: Lips   Secured At 25 cm   Measured From Lips   ETT Placement Right   Secured By Commercial tube may

## 2025-03-01 NOTE — PROGRESS NOTES
Patient awake more alert tonight, moving limbs more, shaking head, tries to sit up, follows some commands.  No changes to vent settings through the night, ABG's show improvement.  Lung sounds rhonchi, moderate to large secretions through the night.  Bowel sounds active, no BM, tube feeding infusing as ordered.  Urinary output 600ml through the night.    RASS +1, restless, versed given one time prior to bathing, no continuous sedation.  Edema improved.  Will continue to monitor.  Call light in reach, wrist restraints in place, Bed alarm on.

## 2025-03-01 NOTE — PROGRESS NOTES
Reassessment complete.    Patient continues intubated and on SBT. Physical assessment unchanged at this time. Patient repositioned for comfort. Bilateral soft wrist restraints remain in place.    Precedex infusing at 0.2 mcg/kg/h.

## 2025-03-01 NOTE — PROGRESS NOTES
RT Inhaler-Nebulizer Bronchodilator Protocol Note    There is a bronchodilator order in the chart from a provider indicating to follow the RT Bronchodilator Protocol and there is an “Initiate RT Inhaler-Nebulizer Bronchodilator Protocol” order as well (see protocol at bottom of note).    CXR Findings:  XR CHEST PORTABLE    Result Date: 2/28/2025  No evidence of acute cardiopulmonary process.  Stable positioning of support apparatus.     XR CHEST PORTABLE    Result Date: 2/27/2025  Minimal vascular congestion with a small left pleural effusion.       The findings from the last RT Protocol Assessment were as follows:   History Pulmonary Disease: Chronic pulmonary disease  Respiratory Pattern: Dyspnea on exertion or RR 21-25 bpm  Breath Sounds: Inspiratory and expiratory or bilateral wheezing and/or rhonchi  Cough: Strong, productive  Indication for Bronchodilator Therapy: Decreased or absent breath sounds  Bronchodilator Assessment Score: 11    Aerosolized bronchodilator medication orders have been revised according to the RT Inhaler-Nebulizer Bronchodilator Protocol below.    Respiratory Therapist to perform RT Therapy Protocol Assessment initially then follow the protocol.  Repeat RT Therapy Protocol Assessment PRN for score 0-3 or on second treatment, BID, and PRN for scores above 3.    No Indications - adjust the frequency to every 6 hours PRN wheezing or bronchospasm, if no treatments needed after 48 hours then discontinue using Per Protocol order mode.     If indication present, adjust the RT bronchodilator orders based on the Bronchodilator Assessment Score as indicated below.  Use Inhaler orders unless patient has one or more of the following: on home nebulizer, not able to hold breath for 10 seconds, is not alert and oriented, cannot activate and use MDI correctly, or respiratory rate 25 breaths per minute or more, then use the equivalent nebulizer order(s) with same Frequency and PRN reasons based on the

## 2025-03-01 NOTE — PROGRESS NOTES
Shift assessment complete.    Patient seen intubated and awake in bed. Patient is alert, following commands - anxious at this time AEB attempting to sit up in bed and pulling at restraints. Patient intubated with a # 7.5 ETT at the 25 LL. Patient currently on SBT per RT. Patient with no s/s of pain noted.    Physical assessment as charted in flow sheets.    Scheduled medications given per orders.    Peripheral IV C/D/I and functioning properly.    NG intact and secure, tube feed off at this time for SBT.    Castrejon intact and secure, clear yellow urine draining. Castrejon remains in place for urinary retention and strict I&O.     Patient repositioned for comfort. Bilateral soft wrist restraints in place for patient safety and the safety of all tubes and cords.

## 2025-03-01 NOTE — PROGRESS NOTES
Pulmonary & Critical Care Medicine ICU Progress Note    CC: COPD respiratory failure    Events of Last 24 hours:   Agitated this am placed on Precedex  On PS  seems to be doing okay   Precedex 0.2 mcg/kg/hr   PEEP 5  FiO2 50%  Did well  all day yesterday - failed  this am   Responding well to diuresis       Vascular lines: IV: Left IJ -. R Midline     MV:   Vent Mode: AC/VC Resp Rate (Set): 22 bpm/Vt (Set, mL): 400 mL/ /FiO2 : 50 %  Recent Labs     25  0956 25  0435   PHART 7.429 7.439   [id number] 63.0* 60.4*   PO2ART 78.4 77.9       IV:   sodium chloride      sodium chloride      dexmedeTOMIDine HCl in NaCl Stopped (25 0246)    sodium chloride      dextrose         Vitals:  Blood pressure (!) 146/69, pulse (!) 123, temperature 99.6 °F (37.6 °C), temperature source Bladder, resp. rate 20, height 1.702 m (5' 7.01\"), weight 86.8 kg (191 lb 5.8 oz), SpO2 94%.  on   Temp  Av.9 °F (37.7 °C)  Min: 99.6 °F (37.6 °C)  Max: 100.3 °F (37.9 °C)    Intake/Output Summary (Last 24 hours) at 3/1/2025 0741  Last data filed at 3/1/2025 0400  Gross per 24 hour   Intake 937 ml   Output 2245 ml   Net -1308 ml     EXAM:  General: ill appearing.  Intubated.  Eyes: No sclera icterus. No conjunctival injection.  ENT: No discharge. ET tube in place  Neck: Trachea midline.   Resp: No accessory muscle use. Few crackles. No wheezing. + rhonchi.   CV: Regular  rate. Regular rhythm. No mumur or rub. + LE edema.   GI: Non-tender. Non-distended.   Skin: Warm and dry. No rash on exposed extremities.  M/S: No cyanosis. No clubbing.   Neuro: Intubated. + following commands.  Good cough.   Psych: Noncommunicative unable to obtain        Scheduled Meds:   polyethylene glycol  17 g Oral Daily    Gabapentin  200 mg Per NG tube BID    furosemide  40 mg IntraVENous Daily    insulin glargine  10 Units SubCUTAneous BID    levETIRAcetam  750 mg Oral BID    thiamine  200 mg Oral Daily    sodium chloride flush  Aortobifemoral bypass graft with no obvious complications.   6. 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 Significant bilateral renal   artery stenosis near the origin.   7. Fat containing inguinal hernias without bowel involvement.   8. Mild fat containing periumbilical hernia.   9. Mild anasarca.      ASSESSMENT:  Acute hypoxemic hypercapnic respiratory failure   Acute encephalopathy/Metabolic encephalopathy   Tracheobronchitis with possible PNA - MRSA    RUE edema- SVT  COPD   Alcohol withdrawal with Delirium tremens  Electrolytes disorder   Ileus/Enteritis ? Developing abscess   Chronic hypoxia on 4 L O2  Flu A + 1/30  Tobacco abuse  ROCKWELL cirrhosis  Chronic diastolic dysfunction  DM2  PVD status post aortoiliac bpg  Patient has had several episodes of seizure-like activity   Alcohol use/abuse 30 beer/day         PLAN:  Mechanical ventilation as per orders for life threatening respiratory failure  Follow ABG and chest x-ray while on the ventilator  PS 8/5 as tolerated during the day   Supplemental oxygen to maintain SaO2 >92%; wean as tolerated  IV Precedex if needed   Add Seroquel 25 PO BID   Inhaled bronchodilators  Fentanyl and Versed PRN, gtt as needed  Head of bed 30 degrees or higher at all times  Inhaled bronchodilators  Completed Vanc D#10/10, Zosyn D#8 and Diflucan D#7, Tamiflu D#5 and Zpak  Keppra started by neurology- Seizure and fall precautions  Decrease Neurontin BID   Rally pack   Lasix 40 IV daily   Electrolytes replacement   Tube feeds - TFs at goal   BM regiment   Blood sugar control ISS with decrease Lantus 10 BID goal 150-180  GI prophylaxis: PPI  DVT prophylaxis: Lovenox  MRSA prophylaxis: Bactroban  Patient is full code            This patient is critically ill. I spent 31 minutes directly engaged in the delivery of critical care to this patient, which was directed towards the patient's critical illness as listed above. This included direct patient contact, management of life support systems

## 2025-03-01 NOTE — PROGRESS NOTES
03/01/25 0800   RT Protocol   History Pulmonary Disease 2   Respiratory pattern 2   Breath sounds 4   Cough 2   Indications for Bronchodilator Therapy Wheezing associated with pulm disorder   Bronchodilator Assessment Score 10

## 2025-03-01 NOTE — PROGRESS NOTES
Patient to ps at this time, 5/5, tolerating well, hr at 122 previous to switch, no change. Rsbi is 61

## 2025-03-01 NOTE — PLAN OF CARE
Problem: Chronic Conditions and Co-morbidities  Goal: Patient's chronic conditions and co-morbidity symptoms are monitored and maintained or improved  2/28/2025 2235 by Bernardo De Leon RN  Outcome: Progressing  Flowsheets (Taken 2/28/2025 2000)  Care Plan - Patient's Chronic Conditions and Co-Morbidity Symptoms are Monitored and Maintained or Improved: Monitor and assess patient's chronic conditions and comorbid symptoms for stability, deterioration, or improvement     Problem: Discharge Planning  Goal: Discharge to home or other facility with appropriate resources  2/28/2025 2235 by Bernardo De Leon RN  Outcome: Progressing  Flowsheets (Taken 2/28/2025 2000)  Discharge to home or other facility with appropriate resources: Identify barriers to discharge with patient and caregiver     Problem: Safety - Adult  Goal: Free from fall injury  2/28/2025 2235 by Bernardo De Leon RN  Outcome: Progressing     Problem: Pain  Goal: Verbalizes/displays adequate comfort level or baseline comfort level  2/28/2025 2235 by Bernardo De Leon RN  Outcome: Progressing     Problem: Respiratory - Adult  Goal: Achieves optimal ventilation and oxygenation  2/28/2025 2235 by Bernardo De Leon RN  Outcome: Progressing     Problem: Cardiovascular - Adult  Goal: Maintains optimal cardiac output and hemodynamic stability  2/28/2025 2235 by Bernardo De Leon RN  Outcome: Progressing  Goal: Absence of cardiac dysrhythmias or at baseline  2/28/2025 2235 by Bernardo De Leon RN  Outcome: Progressing     Problem: Skin/Tissue Integrity - Adult  Goal: Skin integrity remains intact  Description: 1.  Monitor for areas of redness and/or skin breakdown  2.  Assess vascular access sites hourly  3.  Every 4-6 hours minimum:  Change oxygen saturation probe site  4.  Every 4-6 hours:  If on nasal continuous positive airway pressure, respiratory therapy assess nares and determine need for appliance change or resting period  2/28/2025 2235 by Bernardo De Leon

## 2025-03-01 NOTE — PROGRESS NOTES
Progress Note    Admit Date:  1/30/2025    Interval history:  Influenza A and Acute respiratory failure   Was on bipap in iCU and improved, transferred to  [facility]        Has NG placed for Ileus  , he pulled out NG and likely has aspirated became hypoxic and transferred to ICU      Seizure like event 2/7,   Remains confused  MRI brain incomplete .     2/13   Mentation worse today  On precedex   On 4 L of O2     2/21  Ct with resolving ileus  Started on TF @15 cc   Started weaning sedation  Repeat CT abd with no acute findings     2/22  S/p bronch yesterday.  Sedated on vent.    Subjective:  Mr. Early is on ventilator. HR remains elevated. Earlier this morning he was sitting up and pulling at restraints. Precedex started at low dose.     Objective:   Patient Vitals for the past 4 hrs:   BP Temp Temp src Pulse Resp SpO2 Weight   03/01/25 0700 (!) 146/69 99.6 °F (37.6 °C) Bladder (!) 123 20 94 % --   03/01/25 0600 136/70 -- -- (!) 123 23 96 % --   03/01/25 0540 (!) 146/71 -- -- (!) 123 -- -- --   03/01/25 0500 (!) 146/71 -- -- (!) 120 26 97 % --   03/01/25 0415 -- -- -- -- -- -- 86.8 kg (191 lb 5.8 oz)   03/01/25 0400 (!) 143/69 99.7 °F (37.6 °C) Bladder (!) 119 22 98 % --          Intake/Output Summary (Last 24 hours) at 3/1/2025 0708  Last data filed at 3/1/2025 0400  Gross per 24 hour   Intake 937 ml   Output 2245 ml   Net -1308 ml       Physical Exam:  General: more awake and alert. Appears calm. Off sedation.  Oral ETT and OG noted  Left IJ TLC . Appears to be not in any distress  Mucous Membranes:  Pink , anicteric  Neck: No JVD, no carotid bruit, no thyromegaly  Chest:  Clear to auscultation bilaterally, resolved wheeze  Cardiovascular:  RRR S1S2 heard, no murmurs or gallops  Abdomen:  Soft, obese +distended, non tender, no organomegaly, BS absent  Extremities: trace edema to ext improved   Distal pulses well felt  Neurological : Open eyes to voice    cations:  polyethylene glycol, 17 g, Daily  Gabapentin, 200

## 2025-03-01 NOTE — PLAN OF CARE
Problem: Chronic Conditions and Co-morbidities  Goal: Patient's chronic conditions and co-morbidity symptoms are monitored and maintained or improved  Outcome: Progressing     Problem: Discharge Planning  Goal: Discharge to home or other facility with appropriate resources  Outcome: Progressing     Problem: Safety - Adult  Goal: Free from fall injury  Outcome: Progressing     Problem: Pain  Goal: Verbalizes/displays adequate comfort level or baseline comfort level  Outcome: Progressing     Problem: Respiratory - Adult  Goal: Achieves optimal ventilation and oxygenation  Outcome: Progressing     Problem: Cardiovascular - Adult  Goal: Maintains optimal cardiac output and hemodynamic stability  Outcome: Progressing  Goal: Absence of cardiac dysrhythmias or at baseline  Outcome: Progressing     Problem: Skin/Tissue Integrity - Adult  Goal: Skin integrity remains intact  Description: 1.  Monitor for areas of redness and/or skin breakdown  2.  Assess vascular access sites hourly  3.  Every 4-6 hours minimum:  Change oxygen saturation probe site  4.  Every 4-6 hours:  If on nasal continuous positive airway pressure, respiratory therapy assess nares and determine need for appliance change or resting period  Outcome: Progressing  Flowsheets (Taken 3/1/2025 2192)  Skin Integrity Remains Intact: Monitor for areas of redness and/or skin breakdown  Goal: Oral mucous membranes remain intact  Outcome: Progressing     Problem: Musculoskeletal - Adult  Goal: Return mobility to safest level of function  Outcome: Progressing  Goal: Maintain proper alignment of affected body part  Outcome: Progressing  Goal: Return ADL status to a safe level of function  Outcome: Progressing     Problem: Gastrointestinal - Adult  Goal: Maintains or returns to baseline bowel function  Outcome: Progressing  Goal: Maintains adequate nutritional intake  Outcome: Progressing     Problem: Infection - Adult  Goal: Absence of infection at discharge  Outcome:

## 2025-03-01 NOTE — PROGRESS NOTES
Patient on SBT, sitting up and pulling at restraints. Patient thrashing head back and forth.  - 140, RR in t he 30's. Patient requiring increase on vent to 8/5 to facilitate SBT. Precedex started at low dose.    Precedex infusing at 0.2 mcg/kg/h.

## 2025-03-01 NOTE — PROGRESS NOTES
Care rounds complete with Dr. Romero. Patient to continue to trail all day shift today if tolerated.

## 2025-03-02 ENCOUNTER — APPOINTMENT (OUTPATIENT)
Dept: GENERAL RADIOLOGY | Age: 70
DRG: 207 | End: 2025-03-02
Payer: MEDICARE

## 2025-03-02 PROBLEM — J10.1 INFLUENZA A: Status: RESOLVED | Noted: 2025-01-31 | Resolved: 2025-03-02

## 2025-03-02 LAB
ANION GAP SERPL CALCULATED.3IONS-SCNC: 6 MMOL/L (ref 3–16)
BASE EXCESS BLDA CALC-SCNC: 13.9 MMOL/L (ref -3–3)
BUN SERPL-MCNC: 21 MG/DL (ref 7–20)
CALCIUM SERPL-MCNC: 8.2 MG/DL (ref 8.3–10.6)
CHLORIDE SERPL-SCNC: 97 MMOL/L (ref 99–110)
CO2 BLDA-SCNC: 40.6 MMOL/L
CO2 SERPL-SCNC: 39 MMOL/L (ref 21–32)
COHGB MFR BLDA: 0.6 % (ref 0–1.5)
CREAT SERPL-MCNC: 0.8 MG/DL (ref 0.8–1.3)
DEPRECATED RDW RBC AUTO: 16.9 % (ref 12.4–15.4)
GFR SERPLBLD CREATININE-BSD FMLA CKD-EPI: >90 ML/MIN/{1.73_M2}
GLUCOSE BLD-MCNC: 113 MG/DL (ref 70–99)
GLUCOSE BLD-MCNC: 116 MG/DL (ref 70–99)
GLUCOSE BLD-MCNC: 119 MG/DL (ref 70–99)
GLUCOSE BLD-MCNC: 123 MG/DL (ref 70–99)
GLUCOSE BLD-MCNC: 124 MG/DL (ref 70–99)
GLUCOSE BLD-MCNC: 127 MG/DL (ref 70–99)
GLUCOSE SERPL-MCNC: 100 MG/DL (ref 70–99)
HCO3 BLDA-SCNC: 38.9 MMOL/L (ref 21–29)
HCT VFR BLD AUTO: 25.3 % (ref 40.5–52.5)
HGB BLD-MCNC: 8.3 G/DL (ref 13.5–17.5)
HGB BLDA-MCNC: 8.8 G/DL (ref 13.5–17.5)
MCH RBC QN AUTO: 30.1 PG (ref 26–34)
MCHC RBC AUTO-ENTMCNC: 33 G/DL (ref 31–36)
MCV RBC AUTO: 91.2 FL (ref 80–100)
METHGB MFR BLDA: 0.3 %
O2 THERAPY: ABNORMAL
PCO2 BLDA: 53 MMHG (ref 35–45)
PERFORMED ON: ABNORMAL
PH BLDA: 7.48 [PH] (ref 7.35–7.45)
PLATELET # BLD AUTO: 444 K/UL (ref 135–450)
PMV BLD AUTO: 7.5 FL (ref 5–10.5)
PO2 BLDA: 76.1 MMHG (ref 75–108)
POTASSIUM SERPL-SCNC: 4.1 MMOL/L (ref 3.5–5.1)
RBC # BLD AUTO: 2.77 M/UL (ref 4.2–5.9)
SAO2 % BLDA: 95.8 %
SODIUM SERPL-SCNC: 142 MMOL/L (ref 136–145)
WBC # BLD AUTO: 6.5 K/UL (ref 4–11)

## 2025-03-02 PROCEDURE — 36415 COLL VENOUS BLD VENIPUNCTURE: CPT

## 2025-03-02 PROCEDURE — 6370000000 HC RX 637 (ALT 250 FOR IP): Performed by: INTERNAL MEDICINE

## 2025-03-02 PROCEDURE — 82803 BLOOD GASES ANY COMBINATION: CPT

## 2025-03-02 PROCEDURE — 94761 N-INVAS EAR/PLS OXIMETRY MLT: CPT

## 2025-03-02 PROCEDURE — 2500000003 HC RX 250 WO HCPCS: Performed by: INTERNAL MEDICINE

## 2025-03-02 PROCEDURE — 71045 X-RAY EXAM CHEST 1 VIEW: CPT

## 2025-03-02 PROCEDURE — 94640 AIRWAY INHALATION TREATMENT: CPT

## 2025-03-02 PROCEDURE — 80048 BASIC METABOLIC PNL TOTAL CA: CPT

## 2025-03-02 PROCEDURE — 99233 SBSQ HOSP IP/OBS HIGH 50: CPT | Performed by: INTERNAL MEDICINE

## 2025-03-02 PROCEDURE — 94003 VENT MGMT INPAT SUBQ DAY: CPT

## 2025-03-02 PROCEDURE — 6360000002 HC RX W HCPCS: Performed by: INTERNAL MEDICINE

## 2025-03-02 PROCEDURE — 2700000000 HC OXYGEN THERAPY PER DAY

## 2025-03-02 PROCEDURE — 2000000000 HC ICU R&B

## 2025-03-02 PROCEDURE — 2580000003 HC RX 258: Performed by: INTERNAL MEDICINE

## 2025-03-02 PROCEDURE — 85027 COMPLETE CBC AUTOMATED: CPT

## 2025-03-02 PROCEDURE — 2500000003 HC RX 250 WO HCPCS: Performed by: STUDENT IN AN ORGANIZED HEALTH CARE EDUCATION/TRAINING PROGRAM

## 2025-03-02 PROCEDURE — 6360000002 HC RX W HCPCS

## 2025-03-02 PROCEDURE — 99291 CRITICAL CARE FIRST HOUR: CPT | Performed by: INTERNAL MEDICINE

## 2025-03-02 RX ORDER — QUETIAPINE FUMARATE 25 MG/1
50 TABLET, FILM COATED ORAL 2 TIMES DAILY
Status: DISCONTINUED | OUTPATIENT
Start: 2025-03-02 | End: 2025-03-06

## 2025-03-02 RX ADMIN — DILTIAZEM HYDROCHLORIDE 60 MG: 60 TABLET ORAL at 14:20

## 2025-03-02 RX ADMIN — DILTIAZEM HYDROCHLORIDE 60 MG: 60 TABLET ORAL at 05:49

## 2025-03-02 RX ADMIN — SODIUM CHLORIDE, PRESERVATIVE FREE 10 ML: 5 INJECTION INTRAVENOUS at 10:34

## 2025-03-02 RX ADMIN — FUROSEMIDE 40 MG: 10 INJECTION, SOLUTION INTRAMUSCULAR; INTRAVENOUS at 10:31

## 2025-03-02 RX ADMIN — QUETIAPINE FUMARATE 25 MG: 25 TABLET ORAL at 10:31

## 2025-03-02 RX ADMIN — Medication 5000 UNITS: at 10:31

## 2025-03-02 RX ADMIN — INSULIN GLARGINE 10 UNITS: 100 INJECTION, SOLUTION SUBCUTANEOUS at 19:59

## 2025-03-02 RX ADMIN — SODIUM CHLORIDE, PRESERVATIVE FREE 10 ML: 5 INJECTION INTRAVENOUS at 19:59

## 2025-03-02 RX ADMIN — SODIUM CHLORIDE, PRESERVATIVE FREE 10 ML: 5 INJECTION INTRAVENOUS at 20:00

## 2025-03-02 RX ADMIN — METOCLOPRAMIDE 10 MG: 5 INJECTION, SOLUTION INTRAMUSCULAR; INTRAVENOUS at 14:20

## 2025-03-02 RX ADMIN — MIDAZOLAM HYDROCHLORIDE 2 MG: 1 INJECTION, SOLUTION INTRAMUSCULAR; INTRAVENOUS at 09:04

## 2025-03-02 RX ADMIN — ALBUTEROL SULFATE 2.5 MG: 2.5 SOLUTION RESPIRATORY (INHALATION) at 19:45

## 2025-03-02 RX ADMIN — ENOXAPARIN SODIUM 40 MG: 100 INJECTION SUBCUTANEOUS at 10:31

## 2025-03-02 RX ADMIN — ATORVASTATIN CALCIUM 40 MG: 40 TABLET, FILM COATED ORAL at 10:32

## 2025-03-02 RX ADMIN — GABAPENTIN 200 MG: 250 SOLUTION ORAL at 19:58

## 2025-03-02 RX ADMIN — DEXMEDETOMIDINE HYDROCHLORIDE 0.5 MCG/KG/HR: 4 INJECTION, SOLUTION INTRAVENOUS at 04:34

## 2025-03-02 RX ADMIN — METOPROLOL TARTRATE 50 MG: 50 TABLET, FILM COATED ORAL at 10:31

## 2025-03-02 RX ADMIN — METOCLOPRAMIDE 10 MG: 5 INJECTION, SOLUTION INTRAMUSCULAR; INTRAVENOUS at 19:58

## 2025-03-02 RX ADMIN — QUETIAPINE FUMARATE 50 MG: 25 TABLET ORAL at 19:59

## 2025-03-02 RX ADMIN — ALBUTEROL SULFATE 2.5 MG: 2.5 SOLUTION RESPIRATORY (INHALATION) at 08:20

## 2025-03-02 RX ADMIN — ACETAZOLAMIDE SODIUM 500 MG: 500 INJECTION, POWDER, LYOPHILIZED, FOR SOLUTION INTRAVENOUS at 12:54

## 2025-03-02 RX ADMIN — GABAPENTIN 200 MG: 250 SOLUTION ORAL at 10:32

## 2025-03-02 RX ADMIN — MIDAZOLAM HYDROCHLORIDE 2 MG: 1 INJECTION, SOLUTION INTRAMUSCULAR; INTRAVENOUS at 07:30

## 2025-03-02 RX ADMIN — DEXMEDETOMIDINE HYDROCHLORIDE 1 MCG/KG/HR: 4 INJECTION, SOLUTION INTRAVENOUS at 11:39

## 2025-03-02 RX ADMIN — ALBUTEROL SULFATE 2.5 MG: 2.5 SOLUTION RESPIRATORY (INHALATION) at 15:05

## 2025-03-02 RX ADMIN — PANTOPRAZOLE SODIUM 40 MG: 40 INJECTION, POWDER, LYOPHILIZED, FOR SOLUTION INTRAVENOUS at 10:31

## 2025-03-02 RX ADMIN — METOCLOPRAMIDE 10 MG: 5 INJECTION, SOLUTION INTRAMUSCULAR; INTRAVENOUS at 02:52

## 2025-03-02 RX ADMIN — Medication 200 MG: at 10:31

## 2025-03-02 RX ADMIN — POLYETHYLENE GLYCOL (3350) 17 G: 17 POWDER, FOR SOLUTION ORAL at 10:32

## 2025-03-02 RX ADMIN — Medication 750 MG: at 19:58

## 2025-03-02 RX ADMIN — Medication 750 MG: at 10:33

## 2025-03-02 RX ADMIN — ALLOPURINOL 300 MG: 300 TABLET ORAL at 10:31

## 2025-03-02 RX ADMIN — DEXMEDETOMIDINE HYDROCHLORIDE 0.8 MCG/KG/HR: 4 INJECTION, SOLUTION INTRAVENOUS at 20:29

## 2025-03-02 RX ADMIN — DEXMEDETOMIDINE HYDROCHLORIDE 1 MCG/KG/HR: 4 INJECTION, SOLUTION INTRAVENOUS at 16:15

## 2025-03-02 RX ADMIN — METOCLOPRAMIDE 10 MG: 5 INJECTION, SOLUTION INTRAMUSCULAR; INTRAVENOUS at 10:31

## 2025-03-02 RX ADMIN — SODIUM CHLORIDE, PRESERVATIVE FREE 10 ML: 5 INJECTION INTRAVENOUS at 10:33

## 2025-03-02 RX ADMIN — INSULIN GLARGINE 10 UNITS: 100 INJECTION, SOLUTION SUBCUTANEOUS at 10:32

## 2025-03-02 RX ADMIN — ALBUTEROL SULFATE 2.5 MG: 2.5 SOLUTION RESPIRATORY (INHALATION) at 11:10

## 2025-03-02 ASSESSMENT — PULMONARY FUNCTION TESTS
PIF_VALUE: 28
PIF_VALUE: 30
PIF_VALUE: 29
PIF_VALUE: 14
PIF_VALUE: 14
PIF_VALUE: 35
PIF_VALUE: 14
PIF_VALUE: 28
PIF_VALUE: 14

## 2025-03-02 NOTE — PROGRESS NOTES
Progress Note    Admit Date:  1/30/2025    Interval history:  Influenza A and Acute respiratory failure   Was on bipap in iCU and improved, transferred to [facility]        Has NG placed for Ileus  , he pulled out NG and likely has aspirated became hypoxic and transferred to ICU      Seizure like event 2/7,   Remains confused  MRI brain incomplete .     2/13   Mentation worse today  On precedex   On 4 L of O2     2/21  Ct with resolving ileus  Started on TF @15 cc   Started weaning sedation  Repeat CT abd with no acute findings     2/22  S/p bronch yesterday.  Sedated on vent.    Subjective:  Mr. Early seen on ventilator. No agitation.     Objective:   Patient Vitals for the past 4 hrs:   BP Temp Temp src Pulse Resp SpO2 Weight   03/02/25 0549 (!) 116/56 -- -- 74 -- -- --   03/02/25 0500 (!) 116/56 -- -- 80 22 98 % --   03/02/25 0400 104/67 (!) 100.5 °F (38.1 °C) Bladder 83 23 97 % 85.5 kg (188 lb 7.9 oz)   03/02/25 0300 102/60 -- -- 84 24 96 % --   03/02/25 0259 -- -- -- 85 25 96 % --   03/02/25 0200 (!) 97/59 -- -- 84 24 97 % --          Intake/Output Summary (Last 24 hours) at 3/2/2025 0551  Last data filed at 3/2/2025 0404  Gross per 24 hour   Intake 971.04 ml   Output 1775 ml   Net -803.96 ml       Physical Exam:  General: more awake and alert. Appears calm. Off sedation.  Oral ETT and OG noted  Left IJ TLC . Appears to be not in any distress  Mucous Membranes:  Pink , anicteric  Neck: No JVD, no carotid bruit, no thyromegaly  Chest:  Clear to auscultation bilaterally, resolved wheeze  Cardiovascular:  RRR S1S2 heard, no murmurs or gallops  Abdomen:  Soft, obese +distended, non tender, no organomegaly, BS absent  Extremities: trace edema to ext improved   Distal pulses well felt  Neurological : Open eyes to voice    cations:  QUEtiapine, 25 mg, BID  polyethylene glycol, 17 g, Daily  Gabapentin, 200 mg, BID  furosemide, 40 mg, Daily  insulin glargine, 10 Units, BID  levETIRAcetam, 750 mg, BID  thiamine, 200 mg,

## 2025-03-02 NOTE — PROGRESS NOTES
03/01/25 1925   Patient Observation   Pulse 90   Respirations 23   SpO2 98 %   Patient Observations ETT SIZE 7.5, SECURED AT 25 LIP LINE.  AMBU BAG AT HEAD OF BED.  WATER GOOD.   Breath Sounds   Right Upper Lobe Rhonchi   Right Middle Lobe Diminished   Right Lower Lobe Diminished   Left Upper Lobe Rhonchi   Left Lower Lobe Diminished   Vent Information   Vent Mode AC/VC   Ventilator Settings   FiO2  50 %   Vt (Set, mL) 400 mL   Resp Rate (Set) 22 bpm   PEEP/CPAP (cmH2O) 5   Vent Patient Data (Readings)   Vt (Measured) 464 mL   Peak Inspiratory Pressure (cmH2O) 27 cmH2O   Rate Measured 23 br/min   Minute Volume (L/min) 10.5 Liters   Peak Inspiratory Flow (lpm) 80 L/sec   Mean Airway Pressure (cmH2O) 9.3 cmH20   Plateau Pressure (cm H2O) 14 cm H2O   Driving Pressure 9   I:E Ratio 1:3.80   Flow Sensitivity 3 L/min   Static Compliance (L/cm H2O) 52   Dynamic Compliance (L/cm H2O) 21 L/cm H2O   Vent Alarm Settings   High Pressure (cmH2O) 55 cmH2O   Low Minute Volume (lpm) 4 L/min   High Minute Volume (lpm) 20 L/min   Low Exhaled Vt (ml) 250 mL   High Exhaled Vt (ml) 1000 mL   RR High (bpm) 40 br/min   Apnea (secs) 20 secs   Additional Respiratoray Assessments   Humidification Source Heated wire   Humidification Temp 36.8   Circuit Condensation Drained   Ambu Bag With Mask At Bedside Yes   Airway Clearance   Suction ET Tube   Suction Device Inline suction catheter   Sputum Method Obtained Endotracheal   Sputum Amount Small   Sputum Color/Odor Tan   Sputum Consistency Thick   ETT    Placement Date/Time: 02/14/25 1135   Present on Admission/Arrival: No  Placed By: RT  Placement Verified By: Auscultation;Colorimetric ETCO2 device;Chest X-ray  Preoxygenation: Yes  Airway Tube Size: 7.5 mm  Secured At: 24 cm  Measured From: Lips   Secured At 25 cm   Measured From Lips   ETT Placement (S)  Center   Secured By Commercial tube may   Site Assessment Dry

## 2025-03-02 NOTE — PROGRESS NOTES
03/02/25 0259   Patient Observation   Pulse 85   Respirations 25   SpO2 96 %   Patient Observations 7.5 ett 25 at lip   Breath Sounds   Right Upper Lobe Diminished   Right Middle Lobe Diminished   Right Lower Lobe Diminished   Left Upper Lobe Diminished   Left Lower Lobe Diminished   Vent Information   Vent Mode AC/VC   Ventilator Settings   FiO2  50 %   Vt (Set, mL) 400 mL   Resp Rate (Set) 22 bpm   PEEP/CPAP (cmH2O) 5   Vent Patient Data (Readings)   Vt (Measured) 455 mL   Peak Inspiratory Pressure (cmH2O) 30 cmH2O   Rate Measured 26 br/min   Minute Volume (L/min) 12 Liters   Mean Airway Pressure (cmH2O) 11 cmH20   Plateau Pressure (cm H2O) 14 cm H2O   Driving Pressure 9   I:E Ratio 1:3.50   Flow Sensitivity 3 L/min   Vent Alarm Settings   High Pressure (cmH2O) 55 cmH2O   Low Minute Volume (lpm) 4 L/min   Low Exhaled Vt (ml) 250 mL   RR High (bpm) 40 br/min   Apnea (secs) 20 secs   Additional Respiratoray Assessments   Humidification Source Heated wire   Humidification Temp 37   Circuit Condensation Drained   Ambu Bag With Mask At Bedside Yes   Airway Clearance   Suction ET Tube   Suction Device Inline suction catheter   Sputum Method Obtained Endotracheal   Sputum Amount Moderate   Sputum Color/Odor White   Sputum Consistency Thick   ETT    Placement Date/Time: 02/14/25 1135   Present on Admission/Arrival: No  Placed By: RT  Placement Verified By: Auscultation;Colorimetric ETCO2 device;Chest X-ray  Preoxygenation: Yes  Airway Tube Size: 7.5 mm  Secured At: 24 cm  Measured From: Lips   Secured At 25 cm   Measured From Lips   ETT Placement Right   Secured By Commercial tube may

## 2025-03-02 NOTE — PROGRESS NOTES
4 Eyes Skin Assessment     NAME:  Sandor Early  YOB: 1955  MEDICAL RECORD NUMBER:  [id number]    The patient is being assessed for  Shift Handoff    I agree that at least one RN has performed a thorough Head to Toe Skin Assessment on the patient. ALL assessment sites listed below have been assessed.      Areas assessed by both nurses:    Head, Face, Ears, Shoulders, Back, Chest, Arms, Elbows, Hands, Sacrum. Buttock, Coccyx, Ischium, Legs. Feet and Heels, and Under Medical Devices         Does the Patient have a Wound? No noted wound(s)       Abraham Prevention initiated by RN: Yes  Wound Care Orders initiated by RN: Yes    Pressure Injury (Stage 3,4, Unstageable, DTI, NWPT, and Complex wounds) if present, place Wound referral order by RN under : No    New Ostomies, if present place, Ostomy referral order under : No     Nurse 1 eSignature: Electronically signed by Lois Baker RN on 3/2/25 at 2:40 AM EST    **SHARE this note so that the co-signing nurse can place an eSignature**    Nurse 2 eSignature: Electronically signed by Javier Rogers RN on 3/2/25 at 5:29 AM EST     Patient is not able to demonstrated the ability to move from a reclining position to an upright position within the recliner. Patient is confused, demented and /or unable to follow instruction.

## 2025-03-02 NOTE — PROGRESS NOTES
RT Inhaler-Nebulizer Bronchodilator Protocol Note    There is a bronchodilator order in the chart from a provider indicating to follow the RT Bronchodilator Protocol and there is an “Initiate RT Inhaler-Nebulizer Bronchodilator Protocol” order as well (see protocol at bottom of note).    CXR Findings:  XR CHEST PORTABLE    Result Date: 3/1/2025  Stable appearance of the chest and support apparatus.     XR CHEST PORTABLE    Result Date: 2/28/2025  No evidence of acute cardiopulmonary process.  Stable positioning of support apparatus.       The findings from the last RT Protocol Assessment were as follows:   History Pulmonary Disease: Chronic pulmonary disease  Respiratory Pattern: Dyspnea on exertion or RR 21-25 bpm  Breath Sounds: Inspiratory and expiratory or bilateral wheezing and/or rhonchi  Cough: Strong, productive  Indication for Bronchodilator Therapy: Decreased or absent breath sounds  Bronchodilator Assessment Score: 11    Aerosolized bronchodilator medication orders have been revised according to the RT Inhaler-Nebulizer Bronchodilator Protocol below.    Respiratory Therapist to perform RT Therapy Protocol Assessment initially then follow the protocol.  Repeat RT Therapy Protocol Assessment PRN for score 0-3 or on second treatment, BID, and PRN for scores above 3.    No Indications - adjust the frequency to every 6 hours PRN wheezing or bronchospasm, if no treatments needed after 48 hours then discontinue using Per Protocol order mode.     If indication present, adjust the RT bronchodilator orders based on the Bronchodilator Assessment Score as indicated below.  Use Inhaler orders unless patient has one or more of the following: on home nebulizer, not able to hold breath for 10 seconds, is not alert and oriented, cannot activate and use MDI correctly, or respiratory rate 25 breaths per minute or more, then use the equivalent nebulizer order(s) with same Frequency and PRN reasons based on the score.  If a

## 2025-03-02 NOTE — PROGRESS NOTES
Pt remains intubated and Sedated.  ET Tube 7.5 in place. Vent setting AC/VC 22/400/50/5.  NG tube in Left nares at 60 cm. Glucerna 1.5 infusing at 40 ml/hr water flushes 30 ml Q 3 H.  Precedex infusing at 0.6 mcg/kg/hr.  2 PIV's in place.  SR with first degree AV block on the monitor.  Temperature sensing chavira in place.  BUE soft wrist restraints in place due to attempts of pulling at ETT and lines. No signs of injury noted and PROM performed.    Pt gets restless and agitated when care provided.

## 2025-03-02 NOTE — PROGRESS NOTES
03/01/25 2252   Patient Observation   Patient Observations ETT 7.5/ 25@lips   Breath Sounds   Respiratory Pattern Regular   Right Upper Lobe Rhonchi   Right Middle Lobe Diminished   Right Lower Lobe Diminished   Left Upper Lobe Rhonchi   Left Lower Lobe Diminished   Vent Information   Vent Mode AC/VC   Vent Patient Data (Readings)   Vt (Measured) 452 mL   Peak Inspiratory Pressure (cmH2O) 28 cmH2O   Minute Volume (L/min) 9.92 Liters   Peak Inspiratory Flow (lpm) 80 L/sec   Flow Sensitivity 3 L/min   Backup Apnea On   Backup Rate 22 Breaths Per Minute   Backup Vt 400   Vent Alarm Settings   High Minute Volume (lpm) 20 L/min   Low Exhaled Vt (ml) 250 mL   High Exhaled Vt (ml) 1000 mL   Apnea (secs) 20 secs   Additional Respiratoray Assessments   Humidification Source Heated wire   Humidification Temp 36.9   Circuit Condensation Drained   Ambu Bag With Mask At Bedside Yes   Airway Clearance   Suction ET Tube   Suction Device Inline suction catheter   Sputum Method Obtained Endotracheal   Sputum Amount Moderate   Sputum Color/Odor White   Sputum Consistency Thick   ETT    Placement Date/Time: 02/14/25 1135   Present on Admission/Arrival: No  Placed By: RT  Placement Verified By: Auscultation;Colorimetric ETCO2 device;Chest X-ray  Preoxygenation: Yes  Airway Tube Size: 7.5 mm  Secured At: 24 cm  Measured From: Lips   Secured At 25 cm   Measured From Lips   ETT Placement Left  (moved by RT)   Secured By Commercial tube quispe   Site Assessment Dry   Cuff Pressure   (mov)   Tie/Quispe Changed No

## 2025-03-02 NOTE — PROGRESS NOTES
Pulmonary & Critical Care Medicine ICU Progress Note    CC: COPD respiratory failure    Events of Last 24 hours:   PS 8 yesterday for 6 hrs   Precedex 1 mcg/kg/hr   PEEP 5  FiO2 50%  This am       Vascular lines: IV: Left IJ -. R Midline     MV:   Vent Mode: AC/VC Resp Rate (Set): 22 bpm/Vt (Set, mL): 400 mL/ /FiO2 : 50 %  Recent Labs     25  0435 25  0416   PHART 7.439 7.484*   BMQ5NDX 60.4* 53.0*   PO2ART 77.9 76.1       IV:   sodium chloride      sodium chloride      dexmedeTOMIDine HCl in NaCl 0.4 mcg/kg/hr (25 0550)    sodium chloride      dextrose         Vitals:  Blood pressure (!) 111/57, pulse 74, temperature (!) 100.5 °F (38.1 °C), temperature source Bladder, resp. rate 22, height 1.702 m (5' 7.01\"), weight 85.5 kg (188 lb 7.9 oz), SpO2 98%.  on   Temp  Av.2 °F (37.9 °C)  Min: 100 °F (37.8 °C)  Max: 100.5 °F (38.1 °C)    Intake/Output Summary (Last 24 hours) at 3/2/2025 0717  Last data filed at 3/2/2025 0600  Gross per 24 hour   Intake 1106.04 ml   Output 1775 ml   Net -668.96 ml     EXAM:  General: ill appearing.  Intubated.  Eyes: No sclera icterus. No conjunctival injection.  ENT: No discharge. ET tube in place  Neck: Trachea midline.   Resp: No accessory muscle use. Few crackles. No wheezing. + rhonchi.   CV: Regular  rate. Regular rhythm. No mumur or rub. + LE edema.   GI: Non-tender. Non-distended.   Skin: Warm and dry. No rash on exposed extremities.  M/S: No cyanosis. No clubbing.   Neuro: Intubated. + following commands.  Good cough.   Psych: Noncommunicative unable to obtain        Scheduled Meds:   QUEtiapine  25 mg Oral BID    polyethylene glycol  17 g Oral Daily    Gabapentin  200 mg Per NG tube BID    furosemide  40 mg IntraVENous Daily    insulin glargine  10 Units SubCUTAneous BID    levETIRAcetam  750 mg Oral BID    thiamine  200 mg Oral Daily    sodium chloride flush  5-40 mL IntraVENous 2 times per day    lidocaine 1 % injection  50 mg

## 2025-03-02 NOTE — PROGRESS NOTES
03/02/25 1000   RT Protocol   History Pulmonary Disease 2   Respiratory pattern 2   Breath sounds 4   Cough 2   Indications for Bronchodilator Therapy Decreased or absent breath sounds   Bronchodilator Assessment Score 10

## 2025-03-02 NOTE — PLAN OF CARE
Problem: Chronic Conditions and Co-morbidities  Goal: Patient's chronic conditions and co-morbidity symptoms are monitored and maintained or improved  3/2/2025 0234 by Lois Baker RN  Outcome: Progressing  Flowsheets (Taken 3/2/2025 0234)  Care Plan - Patient's Chronic Conditions and Co-Morbidity Symptoms are Monitored and Maintained or Improved:   Monitor and assess patient's chronic conditions and comorbid symptoms for stability, deterioration, or improvement   Collaborate with multidisciplinary team to address chronic and comorbid conditions and prevent exacerbation or deterioration  3/1/2025 1649 by Cyndie Sullivan RN  Outcome: Progressing     Problem: Discharge Planning  Goal: Discharge to home or other facility with appropriate resources  3/2/2025 0234 by Lois Baker RN  Outcome: Progressing  Flowsheets (Taken 3/2/2025 0234)  Discharge to home or other facility with appropriate resources:   Identify barriers to discharge with patient and caregiver   Arrange for needed discharge resources and transportation as appropriate  3/1/2025 1649 by Cyndie Sullivan RN  Outcome: Progressing     Problem: Safety - Adult  Goal: Free from fall injury  3/2/2025 0234 by Lois Baker RN  Outcome: Progressing  Flowsheets (Taken 3/2/2025 0234)  Free From Fall Injury: Instruct family/caregiver on patient safety  3/1/2025 1649 by Cyndie Sullivan RN  Outcome: Progressing     Problem: Pain  Goal: Verbalizes/displays adequate comfort level or baseline comfort level  3/2/2025 0234 by Lois Baker RN  Outcome: Progressing  Flowsheets (Taken 3/2/2025 0234)  Verbalizes/displays adequate comfort level or baseline comfort level:   Encourage patient to monitor pain and request assistance   Assess pain using appropriate pain scale   Administer analgesics based on type and severity of pain and evaluate response  3/1/2025 1649 by Cyndie Sullivan RN  Outcome: Progressing     Problem: Respiratory - Adult  Goal: Achieves optimal ventilation  Mucous Membranes Remain Intact:   Assess oral mucosa and hygiene practices   Implement preventative oral hygiene regimen  3/1/2025 1649 by Cyndie Sullivan RN  Outcome: Progressing     Problem: Musculoskeletal - Adult  Goal: Return mobility to safest level of function  3/1/2025 1649 by Cyndie Sullivan RN  Outcome: Progressing  Goal: Maintain proper alignment of affected body part  3/1/2025 1649 by Cyndie Sullivan RN  Outcome: Progressing  Goal: Return ADL status to a safe level of function  3/1/2025 1649 by Cyndie Sullivan RN  Outcome: Progressing     Problem: Gastrointestinal - Adult  Goal: Maintains or returns to baseline bowel function  3/1/2025 1649 by Cyndie Sullivan RN  Outcome: Progressing  Goal: Maintains adequate nutritional intake  3/1/2025 1649 by Cyndie Sullivan RN  Outcome: Progressing     Problem: Infection - Adult  Goal: Absence of infection at discharge  3/1/2025 1649 by Cyndie Sullivan RN  Outcome: Progressing     Problem: Metabolic/Fluid and Electrolytes - Adult  Goal: Electrolytes maintained within normal limits  3/2/2025 0234 by Lois Baker RN  Outcome: Progressing  Flowsheets (Taken 3/2/2025 0234)  Electrolytes maintained within normal limits:   Monitor labs and assess patient for signs and symptoms of electrolyte imbalances   Administer electrolyte replacement as ordered  3/1/2025 1649 by Cyndie Sullivan RN  Outcome: Progressing  Goal: Glucose maintained within prescribed range  3/1/2025 1649 by Cyndie Sullivan RN  Outcome: Progressing     Problem: Skin/Tissue Integrity  Goal: Skin integrity remains intact  Description: 1.  Monitor for areas of redness and/or skin breakdown  2.  Assess vascular access sites hourly  3.  Every 4-6 hours minimum:  Change oxygen saturation probe site  4.  Every 4-6 hours:  If on nasal continuous positive airway pressure, respiratory therapy assess nares and determine need for appliance change or resting period  3/2/2025 0234 by Lois Baker, RN  Outcome: Progressing  Flowsheets

## 2025-03-03 ENCOUNTER — APPOINTMENT (OUTPATIENT)
Dept: GENERAL RADIOLOGY | Age: 70
DRG: 207 | End: 2025-03-03
Payer: MEDICARE

## 2025-03-03 LAB
ALBUMIN SERPL-MCNC: 2.4 G/DL (ref 3.4–5)
ALP SERPL-CCNC: 218 U/L (ref 40–129)
ALT SERPL-CCNC: 24 U/L (ref 10–40)
ANION GAP SERPL CALCULATED.3IONS-SCNC: 15 MMOL/L (ref 3–16)
AST SERPL-CCNC: 35 U/L (ref 15–37)
BASE EXCESS BLDA CALC-SCNC: 8.4 MMOL/L (ref -3–3)
BASE EXCESS BLDA CALC-SCNC: 8.7 MMOL/L (ref -3–3)
BILIRUB DIRECT SERPL-MCNC: 0.2 MG/DL (ref 0–0.3)
BILIRUB INDIRECT SERPL-MCNC: 0.1 MG/DL (ref 0–1)
BILIRUB SERPL-MCNC: 0.3 MG/DL (ref 0–1)
BUN SERPL-MCNC: 25 MG/DL (ref 7–20)
CALCIUM SERPL-MCNC: 8.2 MG/DL (ref 8.3–10.6)
CHLORIDE SERPL-SCNC: 98 MMOL/L (ref 99–110)
CO2 BLDA-SCNC: 35.2 MMOL/L
CO2 BLDA-SCNC: 35.8 MMOL/L
CO2 SERPL-SCNC: 28 MMOL/L (ref 21–32)
COHGB MFR BLDA: 0.2 % (ref 0–1.5)
COHGB MFR BLDA: 0.6 % (ref 0–1.5)
CREAT SERPL-MCNC: 0.9 MG/DL (ref 0.8–1.3)
DEPRECATED RDW RBC AUTO: 17.1 % (ref 12.4–15.4)
GFR SERPLBLD CREATININE-BSD FMLA CKD-EPI: >90 ML/MIN/{1.73_M2}
GLUCOSE BLD-MCNC: 124 MG/DL (ref 70–99)
GLUCOSE BLD-MCNC: 124 MG/DL (ref 70–99)
GLUCOSE BLD-MCNC: 125 MG/DL (ref 70–99)
GLUCOSE BLD-MCNC: 129 MG/DL (ref 70–99)
GLUCOSE BLD-MCNC: 130 MG/DL (ref 70–99)
GLUCOSE BLD-MCNC: 132 MG/DL (ref 70–99)
GLUCOSE BLD-MCNC: 137 MG/DL (ref 70–99)
GLUCOSE SERPL-MCNC: 111 MG/DL (ref 70–99)
HCO3 BLDA-SCNC: 33.7 MMOL/L (ref 21–29)
HCO3 BLDA-SCNC: 34.1 MMOL/L (ref 21–29)
HCT VFR BLD AUTO: 26.4 % (ref 40.5–52.5)
HGB BLD-MCNC: 8.6 G/DL (ref 13.5–17.5)
HGB BLDA-MCNC: 8.9 G/DL (ref 13.5–17.5)
HGB BLDA-MCNC: 8.9 G/DL (ref 13.5–17.5)
MAGNESIUM SERPL-MCNC: 1.82 MG/DL (ref 1.8–2.4)
MCH RBC QN AUTO: 30.3 PG (ref 26–34)
MCHC RBC AUTO-ENTMCNC: 32.7 G/DL (ref 31–36)
MCV RBC AUTO: 92.6 FL (ref 80–100)
METHGB MFR BLDA: 0.1 %
METHGB MFR BLDA: 0.3 %
O2 THERAPY: ABNORMAL
O2 THERAPY: ABNORMAL
PCO2 BLDA: 50.8 MMHG (ref 35–45)
PCO2 BLDA: 52.5 MMHG (ref 35–45)
PERFORMED ON: ABNORMAL
PH BLDA: 7.43 [PH] (ref 7.35–7.45)
PH BLDA: 7.44 [PH] (ref 7.35–7.45)
PLATELET # BLD AUTO: 462 K/UL (ref 135–450)
PMV BLD AUTO: 7.6 FL (ref 5–10.5)
PO2 BLDA: 64.2 MMHG (ref 75–108)
PO2 BLDA: 91.5 MMHG (ref 75–108)
POTASSIUM SERPL-SCNC: 3.4 MMOL/L (ref 3.5–5.1)
PROT SERPL-MCNC: 7.8 G/DL (ref 6.4–8.2)
RBC # BLD AUTO: 2.85 M/UL (ref 4.2–5.9)
REASON FOR REJECTION: NORMAL
REJECTED TEST: NORMAL
SAO2 % BLDA: 92.9 %
SAO2 % BLDA: 97.1 %
SODIUM SERPL-SCNC: 141 MMOL/L (ref 136–145)
WBC # BLD AUTO: 7.9 K/UL (ref 4–11)

## 2025-03-03 PROCEDURE — 94761 N-INVAS EAR/PLS OXIMETRY MLT: CPT

## 2025-03-03 PROCEDURE — 2580000003 HC RX 258: Performed by: INTERNAL MEDICINE

## 2025-03-03 PROCEDURE — 6370000000 HC RX 637 (ALT 250 FOR IP): Performed by: INTERNAL MEDICINE

## 2025-03-03 PROCEDURE — 99232 SBSQ HOSP IP/OBS MODERATE 35: CPT | Performed by: INTERNAL MEDICINE

## 2025-03-03 PROCEDURE — 83735 ASSAY OF MAGNESIUM: CPT

## 2025-03-03 PROCEDURE — 2500000003 HC RX 250 WO HCPCS: Performed by: STUDENT IN AN ORGANIZED HEALTH CARE EDUCATION/TRAINING PROGRAM

## 2025-03-03 PROCEDURE — 94640 AIRWAY INHALATION TREATMENT: CPT

## 2025-03-03 PROCEDURE — 80048 BASIC METABOLIC PNL TOTAL CA: CPT

## 2025-03-03 PROCEDURE — 85027 COMPLETE CBC AUTOMATED: CPT

## 2025-03-03 PROCEDURE — 6360000002 HC RX W HCPCS: Performed by: INTERNAL MEDICINE

## 2025-03-03 PROCEDURE — 2700000000 HC OXYGEN THERAPY PER DAY

## 2025-03-03 PROCEDURE — 2500000003 HC RX 250 WO HCPCS: Performed by: INTERNAL MEDICINE

## 2025-03-03 PROCEDURE — 6360000002 HC RX W HCPCS

## 2025-03-03 PROCEDURE — 71045 X-RAY EXAM CHEST 1 VIEW: CPT

## 2025-03-03 PROCEDURE — 99291 CRITICAL CARE FIRST HOUR: CPT | Performed by: INTERNAL MEDICINE

## 2025-03-03 PROCEDURE — 36415 COLL VENOUS BLD VENIPUNCTURE: CPT

## 2025-03-03 PROCEDURE — 2000000000 HC ICU R&B

## 2025-03-03 PROCEDURE — 82803 BLOOD GASES ANY COMBINATION: CPT

## 2025-03-03 PROCEDURE — 94003 VENT MGMT INPAT SUBQ DAY: CPT

## 2025-03-03 PROCEDURE — 80076 HEPATIC FUNCTION PANEL: CPT

## 2025-03-03 RX ORDER — METOPROLOL TARTRATE 25 MG/1
25 TABLET, FILM COATED ORAL 2 TIMES DAILY
Status: DISCONTINUED | OUTPATIENT
Start: 2025-03-03 | End: 2025-03-07

## 2025-03-03 RX ADMIN — Medication 200 MG: at 10:05

## 2025-03-03 RX ADMIN — DEXMEDETOMIDINE HYDROCHLORIDE 0.6 MCG/KG/HR: 4 INJECTION, SOLUTION INTRAVENOUS at 02:42

## 2025-03-03 RX ADMIN — INSULIN GLARGINE 10 UNITS: 100 INJECTION, SOLUTION SUBCUTANEOUS at 20:32

## 2025-03-03 RX ADMIN — SODIUM CHLORIDE, PRESERVATIVE FREE 10 ML: 5 INJECTION INTRAVENOUS at 20:34

## 2025-03-03 RX ADMIN — DILTIAZEM HYDROCHLORIDE 60 MG: 60 TABLET ORAL at 21:09

## 2025-03-03 RX ADMIN — Medication 5000 UNITS: at 10:05

## 2025-03-03 RX ADMIN — ENOXAPARIN SODIUM 40 MG: 100 INJECTION SUBCUTANEOUS at 10:06

## 2025-03-03 RX ADMIN — DEXMEDETOMIDINE HYDROCHLORIDE 1 MCG/KG/HR: 4 INJECTION, SOLUTION INTRAVENOUS at 08:57

## 2025-03-03 RX ADMIN — POLYETHYLENE GLYCOL (3350) 17 G: 17 POWDER, FOR SOLUTION ORAL at 10:06

## 2025-03-03 RX ADMIN — ALLOPURINOL 300 MG: 300 TABLET ORAL at 10:05

## 2025-03-03 RX ADMIN — POTASSIUM CHLORIDE 10 MEQ: 7.46 INJECTION, SOLUTION INTRAVENOUS at 11:47

## 2025-03-03 RX ADMIN — INSULIN GLARGINE 10 UNITS: 100 INJECTION, SOLUTION SUBCUTANEOUS at 10:05

## 2025-03-03 RX ADMIN — METOCLOPRAMIDE 10 MG: 5 INJECTION, SOLUTION INTRAMUSCULAR; INTRAVENOUS at 15:11

## 2025-03-03 RX ADMIN — ATORVASTATIN CALCIUM 40 MG: 40 TABLET, FILM COATED ORAL at 10:05

## 2025-03-03 RX ADMIN — DILTIAZEM HYDROCHLORIDE 60 MG: 60 TABLET ORAL at 05:41

## 2025-03-03 RX ADMIN — QUETIAPINE FUMARATE 50 MG: 25 TABLET ORAL at 10:05

## 2025-03-03 RX ADMIN — METOCLOPRAMIDE 10 MG: 5 INJECTION, SOLUTION INTRAMUSCULAR; INTRAVENOUS at 10:06

## 2025-03-03 RX ADMIN — DEXMEDETOMIDINE HYDROCHLORIDE 1 MCG/KG/HR: 4 INJECTION, SOLUTION INTRAVENOUS at 14:52

## 2025-03-03 RX ADMIN — SODIUM CHLORIDE, PRESERVATIVE FREE 10 ML: 5 INJECTION INTRAVENOUS at 20:33

## 2025-03-03 RX ADMIN — FENTANYL CITRATE 50 MCG: 50 INJECTION, SOLUTION INTRAMUSCULAR; INTRAVENOUS at 20:41

## 2025-03-03 RX ADMIN — POTASSIUM CHLORIDE 10 MEQ: 7.46 INJECTION, SOLUTION INTRAVENOUS at 10:44

## 2025-03-03 RX ADMIN — ALBUTEROL SULFATE 2.5 MG: 2.5 SOLUTION RESPIRATORY (INHALATION) at 11:08

## 2025-03-03 RX ADMIN — DILTIAZEM HYDROCHLORIDE 60 MG: 60 TABLET ORAL at 00:12

## 2025-03-03 RX ADMIN — METOPROLOL TARTRATE 25 MG: 25 TABLET, FILM COATED ORAL at 20:32

## 2025-03-03 RX ADMIN — DEXMEDETOMIDINE HYDROCHLORIDE 1 MCG/KG/HR: 4 INJECTION, SOLUTION INTRAVENOUS at 19:00

## 2025-03-03 RX ADMIN — METOCLOPRAMIDE 10 MG: 5 INJECTION, SOLUTION INTRAMUSCULAR; INTRAVENOUS at 03:14

## 2025-03-03 RX ADMIN — MIDAZOLAM HYDROCHLORIDE 2 MG: 1 INJECTION, SOLUTION INTRAMUSCULAR; INTRAVENOUS at 05:20

## 2025-03-03 RX ADMIN — MIDAZOLAM HYDROCHLORIDE 2 MG: 1 INJECTION, SOLUTION INTRAMUSCULAR; INTRAVENOUS at 12:46

## 2025-03-03 RX ADMIN — QUETIAPINE FUMARATE 50 MG: 25 TABLET ORAL at 20:32

## 2025-03-03 RX ADMIN — Medication 750 MG: at 10:05

## 2025-03-03 RX ADMIN — METOCLOPRAMIDE 10 MG: 5 INJECTION, SOLUTION INTRAMUSCULAR; INTRAVENOUS at 20:33

## 2025-03-03 RX ADMIN — DEXMEDETOMIDINE HYDROCHLORIDE 1 MCG/KG/HR: 4 INJECTION, SOLUTION INTRAVENOUS at 23:31

## 2025-03-03 RX ADMIN — ALBUTEROL SULFATE 2.5 MG: 2.5 SOLUTION RESPIRATORY (INHALATION) at 08:01

## 2025-03-03 RX ADMIN — Medication 750 MG: at 21:09

## 2025-03-03 RX ADMIN — MIDAZOLAM HYDROCHLORIDE 2 MG: 1 INJECTION, SOLUTION INTRAMUSCULAR; INTRAVENOUS at 06:30

## 2025-03-03 RX ADMIN — ALBUTEROL SULFATE 2.5 MG: 2.5 SOLUTION RESPIRATORY (INHALATION) at 14:43

## 2025-03-03 RX ADMIN — ALBUTEROL SULFATE 2.5 MG: 2.5 SOLUTION RESPIRATORY (INHALATION) at 20:21

## 2025-03-03 RX ADMIN — PANTOPRAZOLE SODIUM 40 MG: 40 INJECTION, POWDER, LYOPHILIZED, FOR SOLUTION INTRAVENOUS at 10:06

## 2025-03-03 RX ADMIN — GABAPENTIN 200 MG: 250 SOLUTION ORAL at 20:31

## 2025-03-03 RX ADMIN — DILTIAZEM HYDROCHLORIDE 60 MG: 60 TABLET ORAL at 15:11

## 2025-03-03 RX ADMIN — GABAPENTIN 200 MG: 250 SOLUTION ORAL at 10:05

## 2025-03-03 ASSESSMENT — PULMONARY FUNCTION TESTS
PIF_VALUE: 25
PIF_VALUE: 31
PIF_VALUE: 31
PIF_VALUE: 27
PIF_VALUE: 29

## 2025-03-03 ASSESSMENT — PAIN SCALES - GENERAL: PAINLEVEL_OUTOF10: 7

## 2025-03-03 NOTE — PROGRESS NOTES
03/02/25 1947   Patient Observation   Pulse 100   Respirations 23   SpO2 95 %   Patient Observations ETT 7.5/ 25@lips   Breath Sounds   Respiratory Pattern Regular   Right Upper Lobe Rhonchi;Expiratory wheezes   Right Middle Lobe Rhonchi   Right Lower Lobe Rhonchi   Left Upper Lobe Rhonchi   Left Lower Lobe Rhonchi   Vent Information   Vent Mode AC/VC   Ventilator Settings   FiO2  50 %   Vt (Set, mL) 400 mL   Resp Rate (Set) 22 bpm   PEEP/CPAP (cmH2O) 5   Vent Patient Data (Readings)   Vt (Measured) 487 mL   Peak Inspiratory Pressure (cmH2O) 28 cmH2O   Rate Measured 27 br/min   Minute Volume (L/min) 12.3 Liters   Peak Inspiratory Flow (lpm) 80 L/sec   Mean Airway Pressure (cmH2O) 9.7 cmH20   Plateau Pressure (cm H2O) 14 cm H2O   Driving Pressure 9   I:E Ratio 1:3.2   Flow Sensitivity 3 L/min   Backup Apnea On   Backup Rate 22 Breaths Per Minute   Backup Vt 400   Vent Alarm Settings   High Pressure (cmH2O) 55 cmH2O   Low Minute Volume (lpm) 4 L/min   High Minute Volume (lpm) 20 L/min   Low Exhaled Vt (ml) 250 mL   High Exhaled Vt (ml) 1000 mL   RR High (bpm) 40 br/min   Apnea (secs) 20 secs   Additional Respiratoray Assessments   Humidification Source Heated wire   Humidification Temp 36.9   Circuit Condensation Drained   Ambu Bag With Mask At Bedside Yes   Airway Clearance   Suction ET Tube   Suction Device Inline suction catheter   Sputum Method Obtained Endotracheal   Sputum Amount Moderate   Sputum Color/Odor White   Sputum Consistency Thick   ETT    Placement Date/Time: 02/14/25 1135   Present on Admission/Arrival: No  Placed By: RT  Placement Verified By: Auscultation;Colorimetric ETCO2 device;Chest X-ray  Preoxygenation: Yes  Airway Tube Size: 7.5 mm  Secured At: 24 cm  Measured From: Lips   Secured At 25 cm   Measured From Lips   ETT Placement Left   Secured By Commercial tube quispe   Site Assessment Dry   Cuff Pressure   (mov)   Tie/Quispe Changed No

## 2025-03-03 NOTE — PROGRESS NOTES
03/02/25 2255   Patient Observation   Pulse 89   Respirations 22   SpO2 99 %   Patient Observations ETT 7.5/ 25@lips   Breath Sounds   Respiratory Pattern Regular   Right Upper Lobe Rhonchi   Right Middle Lobe Rhonchi   Right Lower Lobe Rhonchi   Left Upper Lobe Rhonchi   Left Lower Lobe Rhonchi   Vent Information   Vent Mode AC/VC   Ventilator Settings   FiO2  50 %   Vt (Set, mL) 400 mL   Resp Rate (Set) 22 bpm   PEEP/CPAP (cmH2O) 5   Vent Patient Data (Readings)   Vt (Measured) 452 mL   Peak Inspiratory Pressure (cmH2O) 29 cmH2O   Rate Measured 22 br/min   Minute Volume (L/min) 9.97 Liters   Peak Inspiratory Flow (lpm) 80 L/sec   Mean Airway Pressure (cmH2O) 9.6 cmH20   Plateau Pressure (cm H2O) 14 cm H2O   Driving Pressure 9   I:E Ratio 1:4.10   Flow Sensitivity 3 L/min   Backup Apnea On   Backup Rate 22 Breaths Per Minute   Backup Vt 400   Vent Alarm Settings   High Pressure (cmH2O) 55 cmH2O   Low Minute Volume (lpm) 4 L/min   High Minute Volume (lpm) 20 L/min   Low Exhaled Vt (ml) 250 mL   High Exhaled Vt (ml) 1000 mL   RR High (bpm) 40 br/min   Apnea (secs) 20 secs   Additional Respiratoray Assessments   Humidification Source Heated wire   Humidification Temp 36.8   Circuit Condensation Drained   Ambu Bag With Mask At Bedside Yes   Airway Clearance   Suction ET Tube   Suction Device Inline suction catheter   Sputum Method Obtained Endotracheal   Sputum Amount Small   Sputum Color/Odor White   Sputum Consistency Thick   ETT    Placement Date/Time: 02/14/25 1135   Present on Admission/Arrival: No  Placed By: RT  Placement Verified By: Auscultation;Colorimetric ETCO2 device;Chest X-ray  Preoxygenation: Yes  Airway Tube Size: 7.5 mm  Secured At: 24 cm  Measured From: Lips   Secured At 25 cm   Measured From Lips   ETT Placement Center  (moved by RT)   Secured By Commercial tube quispe   Site Assessment Dry   Cuff Pressure   (mov)   Tie/Quispe Changed No

## 2025-03-03 NOTE — PROGRESS NOTES
Pt remains intubated and Sedated.  ET Tube 7.5 in place. Vent setting AC/VC 22/400/50/5.  NG tube in Left nares at 60 cm. Glucerna 1.5 infusing at 40 ml/hr water flushes 30 ml Q 3 H.  Precedex infusing at 0.8 mcg/kg/hr.  2 PIV's in place.  SR with first degree AV block on the monitor.  Temperature sensing chavira in place.  BUE soft wrist restraints in place due to attempts of pulling at ETT and lines. No signs of injury noted and PROM performed.    Pt gets restless and agitated when care provided.

## 2025-03-03 NOTE — PLAN OF CARE
Problem: Chronic Conditions and Co-morbidities  Goal: Patient's chronic conditions and co-morbidity symptoms are monitored and maintained or improved  Outcome: Progressing     Problem: Respiratory - Adult  Goal: Achieves optimal ventilation and oxygenation  Outcome: Progressing     Problem: Cardiovascular - Adult  Goal: Maintains optimal cardiac output and hemodynamic stability  Outcome: Progressing     Problem: Skin/Tissue Integrity - Adult  Goal: Skin integrity remains intact  Description: 1.  Monitor for areas of redness and/or skin breakdown  2.  Assess vascular access sites hourly  3.  Every 4-6 hours minimum:  Change oxygen saturation probe site  4.  Every 4-6 hours:  If on nasal continuous positive airway pressure, respiratory therapy assess nares and determine need for appliance change or resting period  Outcome: Progressing

## 2025-03-03 NOTE — PLAN OF CARE
Problem: Chronic Conditions and Co-morbidities  Goal: Patient's chronic conditions and co-morbidity symptoms are monitored and maintained or improved  Outcome: Progressing  Flowsheets (Taken 3/2/2025 2314)  Care Plan - Patient's Chronic Conditions and Co-Morbidity Symptoms are Monitored and Maintained or Improved:   Monitor and assess patient's chronic conditions and comorbid symptoms for stability, deterioration, or improvement   Collaborate with multidisciplinary team to address chronic and comorbid conditions and prevent exacerbation or deterioration     Problem: Discharge Planning  Goal: Discharge to home or other facility with appropriate resources  Outcome: Progressing  Flowsheets (Taken 3/2/2025 2314)  Discharge to home or other facility with appropriate resources:   Identify barriers to discharge with patient and caregiver   Arrange for needed discharge resources and transportation as appropriate     Problem: Safety - Adult  Goal: Free from fall injury  Outcome: Progressing  Flowsheets (Taken 3/2/2025 2314)  Free From Fall Injury: Instruct family/caregiver on patient safety     Problem: Pain  Goal: Verbalizes/displays adequate comfort level or baseline comfort level  Outcome: Progressing  Flowsheets (Taken 3/2/2025 2314)  Verbalizes/displays adequate comfort level or baseline comfort level:   Encourage patient to monitor pain and request assistance   Assess pain using appropriate pain scale   Administer analgesics based on type and severity of pain and evaluate response     Problem: Respiratory - Adult  Goal: Achieves optimal ventilation and oxygenation  Outcome: Progressing  Flowsheets (Taken 3/2/2025 2314)  Achieves optimal ventilation and oxygenation:   Assess for changes in respiratory status   Assess for changes in mentation and behavior   Position to facilitate oxygenation and minimize respiratory effort   Oxygen supplementation based on oxygen saturation or arterial blood gases     Problem:

## 2025-03-03 NOTE — PROGRESS NOTES
4 Eyes Skin Assessment     NAME:  Sandor Early  YOB: 1955  MEDICAL RECORD NUMBER:  [id number]    The patient is being assessed for  Shift Handoff    I agree that at least one RN has performed a thorough Head to Toe Skin Assessment on the patient. ALL assessment sites listed below have been assessed.      Areas assessed by both nurses:    Head, Face, Ears, Shoulders, Back, Chest, Arms, Elbows, Hands, Sacrum. Buttock, Coccyx, Ischium, Legs. Feet and Heels, and Under Medical Devices         Does the Patient have a Wound? Yes wound(s) were present on assessment. LDA wound assessment was Initiated and completed by RN     Bruising on sacrum.  Abraham Prevention initiated by RN: Yes  Wound Care Orders initiated by RN: Yes    Pressure Injury (Stage 3,4, Unstageable, DTI, NWPT, and Complex wounds) if present, place Wound referral order by RN under : No    New Ostomies, if present place, Ostomy referral order under : No     Nurse 1 eSignature: Electronically signed by Lois Baker RN on 3/3/25 at 3:09 AM EST    **SHARE this note so that the co-signing nurse can place an eSignature**    Nurse 2 eSignature: Electronically signed by Gia Panchal RN on 3/3/25 at 6:33 AM EST     Patient is not able to demonstrated the ability to move from a reclining position to an upright position within the recliner. Patient is confused, demented and /or unable to follow instruction.

## 2025-03-03 NOTE — PROGRESS NOTES
Paged Dr. Taco Hurley RN to update on blood gases Electronically signed by Yari Watson on 3/3/2025 at 12:44 PM

## 2025-03-03 NOTE — PROGRESS NOTES
03/03/25 0313   Patient Observation   Pulse 71   Respirations 22   SpO2 99 %   Patient Observations ETT 7.5/ 25@lips   Breath Sounds   Respiratory Pattern Regular   Right Upper Lobe Rhonchi   Right Middle Lobe Rhonchi   Right Lower Lobe Rhonchi   Left Upper Lobe Rhonchi   Left Lower Lobe Rhonchi   Vent Information   Vent Mode AC/VC   Ventilator Settings   FiO2  50 %   Vt (Set, mL) 400 mL   Resp Rate (Set) 22 bpm   PEEP/CPAP (cmH2O) 5   Vent Patient Data (Readings)   Vt (Measured) 422 mL   Peak Inspiratory Pressure (cmH2O) 31 cmH2O   Rate Measured 23 br/min   Minute Volume (L/min) 10.4 Liters   Peak Inspiratory Flow (lpm) 80 L/sec   Mean Airway Pressure (cmH2O) 10 cmH20   Plateau Pressure (cm H2O) 14 cm H2O   Driving Pressure 9   I:E Ratio 1:3.80   Flow Sensitivity 3 L/min   Backup Apnea On   Backup Rate 22 Breaths Per Minute   Backup Vt 400   Vent Alarm Settings   High Pressure (cmH2O) 55 cmH2O   Low Minute Volume (lpm) 4 L/min   High Minute Volume (lpm) 20 L/min   Low Exhaled Vt (ml) 250 mL   High Exhaled Vt (ml) 1000 mL   RR High (bpm) 40 br/min   Apnea (secs) 20 secs   Additional Respiratoray Assessments   Humidification Source Heated wire   Humidification Temp 36.9   Circuit Condensation Drained   Ambu Bag With Mask At Bedside Yes   Airway Clearance   Suction ET Tube   Suction Device Inline suction catheter   Sputum Method Obtained Endotracheal   Sputum Amount Small   Sputum Color/Odor White;Yellow   Sputum Consistency Thick   ETT    Placement Date/Time: 02/14/25 1135   Present on Admission/Arrival: No  Placed By: RT  Placement Verified By: Auscultation;Colorimetric ETCO2 device;Chest X-ray  Preoxygenation: Yes  Airway Tube Size: 7.5 mm  Secured At: 24 cm  Measured From: Lips   Secured At 25 cm   Measured From Lips   ETT Placement Right  (moved by RT)   Secured By Commercial tube quispe   Site Assessment Dry   Cuff Pressure   (mov)   Tie/Quispe Changed No

## 2025-03-03 NOTE — PROGRESS NOTES
Progress Note    Admit Date:  1/30/2025    Interval history    COPD and respiratory failure. On Precedex. SBT today. Tolerated for 6 hours yesterday.      Objective:   Patient Vitals for the past 4 hrs:   BP Temp Temp src Pulse Resp SpO2   03/03/25 1300 128/71 -- -- (!) 117 26 --   03/03/25 1235 -- -- -- -- -- (!) 85 %   03/03/25 1200 124/64 -- -- (!) 107 24 --   03/03/25 1100 125/67 100.4 °F (38 °C) Bladder 97 20 97 %   03/03/25 1000 (!) 107/54 -- -- 83 21 96 %          Intake/Output Summary (Last 24 hours) at 3/3/2025 1335  Last data filed at 3/3/2025 0437  Gross per 24 hour   Intake 1581.58 ml   Output 1875 ml   Net -293.42 ml       Physical Exam:  General: sleepy on precedex.  Left IJ TLC . Appears to be not in any distress  Mucous Membranes:  Pink , anicteric  Neck: No JVD, no carotid bruit, no thyromegaly  Chest:  Clear to auscultation bilaterally, resolved wheeze  Cardiovascular:  RRR S1S2 heard, no murmurs or gallops  Abdomen:  Soft, obese +distended, non tender, no organomegaly, BS absent  Extremities: trace edema to ext improved   Distal pulses well felt  Neurological : sleepy    cations:  metoprolol tartrate, 25 mg, BID  QUEtiapine, 50 mg, BID  polyethylene glycol, 17 g, Daily  Gabapentin, 200 mg, BID  insulin glargine, 10 Units, BID  levETIRAcetam, 750 mg, BID  thiamine, 200 mg, Daily  sodium chloride flush, 5-40 mL, 2 times per day  lidocaine 1 % injection, 50 mg, Once  metoclopramide, 10 mg, Q6H  insulin lispro, 0-16 Units, Q4H  dilTIAZem, 60 mg, 3 times per day  albuterol, 2.5 mg, 4x Daily RT  sodium chloride flush, 5-40 mL, 2 times per day  pantoprazole (PROTONIX) 40 mg in sodium chloride (PF) 0.9 % 10 mL injection, 40 mg, Daily  enoxaparin, 40 mg, Daily  allopurinol, 300 mg, Daily  vitamin D3, 5,000 Units, Daily  [Held by provider] DULoxetine, 60 mg, Daily  atorvastatin, 40 mg, Daily      PRN Medications:  albuterol, 2.5 mg, Q4H PRN  carboxymethylcellulose PF, 1 drop, PRN  sodium chloride flush,

## 2025-03-03 NOTE — PROGRESS NOTES
ABG results noted and called to Dr. España and updated. Pt  and SaO2 decreased to 85% with activity. Will continue on PS settings and attempt to wean again in the AM

## 2025-03-03 NOTE — PROGRESS NOTES
RT Inhaler-Nebulizer Bronchodilator Protocol Note    There is a bronchodilator order in the chart from a provider indicating to follow the RT Bronchodilator Protocol and there is an “Initiate RT Inhaler-Nebulizer Bronchodilator Protocol” order as well (see protocol at bottom of note).    CXR Findings:  XR CHEST PORTABLE    Result Date: 3/2/2025  1. Stable lines and tubes. 2. Stable bilateral airspace opacities.     XR CHEST PORTABLE    Result Date: 3/1/2025  Stable appearance of the chest and support apparatus.       The findings from the last RT Protocol Assessment were as follows:   History Pulmonary Disease: (P) Chronic pulmonary disease  Respiratory Pattern: (P) Dyspnea on exertion or RR 21-25 bpm  Breath Sounds: (P) Inspiratory and expiratory or bilateral wheezing and/or rhonchi  Cough: (P) Strong, productive  Indication for Bronchodilator Therapy: (P) Decreased or absent breath sounds  Bronchodilator Assessment Score: (P) 11    Aerosolized bronchodilator medication orders have been revised according to the RT Inhaler-Nebulizer Bronchodilator Protocol below.    Respiratory Therapist to perform RT Therapy Protocol Assessment initially then follow the protocol.  Repeat RT Therapy Protocol Assessment PRN for score 0-3 or on second treatment, BID, and PRN for scores above 3.    No Indications - adjust the frequency to every 6 hours PRN wheezing or bronchospasm, if no treatments needed after 48 hours then discontinue using Per Protocol order mode.     If indication present, adjust the RT bronchodilator orders based on the Bronchodilator Assessment Score as indicated below.  Use Inhaler orders unless patient has one or more of the following: on home nebulizer, not able to hold breath for 10 seconds, is not alert and oriented, cannot activate and use MDI correctly, or respiratory rate 25 breaths per minute or more, then use the equivalent nebulizer order(s) with same Frequency and PRN reasons based on the score.  If a

## 2025-03-03 NOTE — PROGRESS NOTES
CM-SR, VSS, pt remains afebrile, UO WNL. Pt arouses to verbal stimuli and will nod to yes and no questions.  Pt returned to his previous vent settings and is resting @ this time.

## 2025-03-03 NOTE — PROGRESS NOTES
Pulmonary & Critical Care Medicine ICU Progress Note    CC: COPD respiratory failure    Events of Last 24 hours:   PS 8/5 yesterday for 6 hrs   Precedex 1 mcg/kg/hr     Vascular lines: IV: Left IJ -. R Midline     MV:   Vent Mode: AC/VC Resp Rate (Set): 22 bpm/Vt (Set, mL): 400 mL/ /FiO2 : 50 %  Recent Labs     25  0416 25  0446   PHART 7.484* 7.431   AQK2IMA 53.0* 52.5*   PO2ART 76.1 91.5       IV:   sodium chloride      sodium chloride      dexmedeTOMIDine HCl in NaCl 0.8 mcg/kg/hr (25 0519)    sodium chloride      dextrose         Vitals:  Blood pressure 127/66, pulse (!) 108, temperature 99.9 °F (37.7 °C), temperature source Bladder, resp. rate 20, height 1.702 m (5' 7.01\"), weight 83.9 kg (184 lb 15.5 oz), SpO2 100%.  on   Temp  Av °F (37.8 °C)  Min: 99.9 °F (37.7 °C)  Max: 100.2 °F (37.9 °C)    Intake/Output Summary (Last 24 hours) at 3/3/2025 0802  Last data filed at 3/3/2025 0437  Gross per 24 hour   Intake 1788.55 ml   Output 2175 ml   Net -386.45 ml     EXAM:  General: ill appearing.  Intubated.  Eyes: No sclera icterus. No conjunctival injection.  ENT: No discharge. ET tube in place  Neck: Trachea midline.   Resp: No accessory muscle use. Few crackles. No wheezing. + rhonchi.   CV: Regular  rate. Regular rhythm. No mumur or rub. + LE edema.   GI: Non-tender. Non-distended.   Skin: Warm and dry. No rash on exposed extremities.  M/S: No cyanosis. No clubbing.   Neuro: Intubated. + following commands.  Good cough.   Psych: Noncommunicative unable to obtain        Scheduled Meds:   QUEtiapine  50 mg Oral BID    polyethylene glycol  17 g Oral Daily    Gabapentin  200 mg Per NG tube BID    furosemide  40 mg IntraVENous Daily    insulin glargine  10 Units SubCUTAneous BID    levETIRAcetam  750 mg Oral BID    thiamine  200 mg Oral Daily    sodium chloride flush  5-40 mL IntraVENous 2 times per day    lidocaine 1 % injection  50 mg IntraDERmal Once    metoclopramide   anasarca.      3/3/CXR showed: rll opacity. ETT in place    ASSESSMENT:  Acute hypoxemic hypercapnic respiratory failure   Acute encephalopathy/Metabolic encephalopathy   Tracheobronchitis with possible PNA - MRSA    RUE edema- SVT  COPD   Alcohol withdrawal with Delirium tremens  Ileus/Enteritis   Chronic hypoxia on 4 L O2  Flu A + 1/30  Tobacco abuse  ROCKWE[place] cirrhosis  Chronic diastolic dysfunction  DM2  PVD status post aortoiliac bpg  Patient has had several episodes of seizure-like activity   Alcohol use/abuse 30 beer/day         PLAN:  SBT today Mechanical ventilation as per orders for life threatening respiratory failure  Follow ABG and chest x-ray while on the ventilator  Supplemental oxygen to maintain SaO2 >92%; wean as tolerated  Precedex gtt   Seroquel 50 PO BID  Inhaled bronchodilators  Fentanyl and Versed PRN, gtt as needed  Head of bed 30 degrees or higher at all times  Inhaled bronchodilators  Completed Vanc D#10/10, Zosyn D#8 and Diflucan D#7, Tamiflu D#5 and Zpak  Keppra started by neurology- Seizure and fall precautions  Neurontin BID   Tube feeds - TFs at goal   Blood sugar control ISS with decrease Lantus 10 BID goal 150-180  GI prophylaxis: PPI  DVT prophylaxis: Lovenox  This patient is critically ill. I spent 31 minutes directly engaged in the delivery of critical care to this patient, which was directed towards the patient's critical illness as listed above. This included direct patient contact, management of life support systems review of data (i.e.: imaging and lab), discussion with team members, and this time excludes time spent on procedures.

## 2025-03-04 ENCOUNTER — APPOINTMENT (OUTPATIENT)
Dept: GENERAL RADIOLOGY | Age: 70
DRG: 207 | End: 2025-03-04
Payer: MEDICARE

## 2025-03-04 LAB
ALBUMIN SERPL-MCNC: 2.2 G/DL (ref 3.4–5)
ALBUMIN/GLOB SERPL: -1.4 {RATIO} (ref 1.1–2.2)
ALP SERPL-CCNC: 203 U/L (ref 40–129)
ALT SERPL-CCNC: 19 U/L (ref 10–40)
ANION GAP SERPL CALCULATED.3IONS-SCNC: 17 MMOL/L (ref 3–16)
AST SERPL-CCNC: 31 U/L (ref 15–37)
BASE EXCESS BLDA CALC-SCNC: 2.9 MMOL/L (ref -3–3)
BILIRUB SERPL-MCNC: 0.3 MG/DL (ref 0–1)
BUN SERPL-MCNC: 21 MG/DL (ref 7–20)
CALCIUM SERPL-MCNC: 8.2 MG/DL (ref 8.3–10.6)
CHLORIDE SERPL-SCNC: 103 MMOL/L (ref 99–110)
CO2 BLDA-SCNC: 30.1 MMOL/L
CO2 SERPL-SCNC: 20 MMOL/L (ref 21–32)
COHGB MFR BLDA: 0.7 % (ref 0–1.5)
CREAT SERPL-MCNC: 0.7 MG/DL (ref 0.8–1.3)
DEPRECATED RDW RBC AUTO: 17.3 % (ref 12.4–15.4)
GFR SERPLBLD CREATININE-BSD FMLA CKD-EPI: >90 ML/MIN/{1.73_M2}
GLUCOSE BLD-MCNC: 103 MG/DL (ref 70–99)
GLUCOSE BLD-MCNC: 103 MG/DL (ref 70–99)
GLUCOSE BLD-MCNC: 112 MG/DL (ref 70–99)
GLUCOSE BLD-MCNC: 132 MG/DL (ref 70–99)
GLUCOSE BLD-MCNC: 136 MG/DL (ref 70–99)
GLUCOSE BLD-MCNC: 141 MG/DL (ref 70–99)
GLUCOSE SERPL-MCNC: 112 MG/DL (ref 70–99)
HCO3 BLDA-SCNC: 28.6 MMOL/L (ref 21–29)
HCT VFR BLD AUTO: 26.5 % (ref 40.5–52.5)
HGB BLD-MCNC: 8.5 G/DL (ref 13.5–17.5)
HGB BLDA-MCNC: 8.7 G/DL (ref 13.5–17.5)
MCH RBC QN AUTO: 29.7 PG (ref 26–34)
MCHC RBC AUTO-ENTMCNC: 32.2 G/DL (ref 31–36)
MCV RBC AUTO: 92.1 FL (ref 80–100)
METHGB MFR BLDA: 0.3 %
O2 THERAPY: ABNORMAL
PCO2 BLDA: 50 MMHG (ref 35–45)
PERFORMED ON: ABNORMAL
PH BLDA: 7.38 [PH] (ref 7.35–7.45)
PLATELET # BLD AUTO: 438 K/UL (ref 135–450)
PMV BLD AUTO: 7.7 FL (ref 5–10.5)
PO2 BLDA: 72.6 MMHG (ref 75–108)
POTASSIUM SERPL-SCNC: 4 MMOL/L (ref 3.5–5.1)
PROT SERPL-MCNC: 0.6 G/DL (ref 6.4–8.2)
RBC # BLD AUTO: 2.87 M/UL (ref 4.2–5.9)
SAO2 % BLDA: 94.1 %
SODIUM SERPL-SCNC: 140 MMOL/L (ref 136–145)
WBC # BLD AUTO: 8.6 K/UL (ref 4–11)

## 2025-03-04 PROCEDURE — 6360000002 HC RX W HCPCS: Performed by: INTERNAL MEDICINE

## 2025-03-04 PROCEDURE — 6370000000 HC RX 637 (ALT 250 FOR IP): Performed by: INTERNAL MEDICINE

## 2025-03-04 PROCEDURE — 99232 SBSQ HOSP IP/OBS MODERATE 35: CPT | Performed by: INTERNAL MEDICINE

## 2025-03-04 PROCEDURE — 2500000003 HC RX 250 WO HCPCS: Performed by: INTERNAL MEDICINE

## 2025-03-04 PROCEDURE — 36415 COLL VENOUS BLD VENIPUNCTURE: CPT

## 2025-03-04 PROCEDURE — 2580000003 HC RX 258: Performed by: INTERNAL MEDICINE

## 2025-03-04 PROCEDURE — 99291 CRITICAL CARE FIRST HOUR: CPT | Performed by: INTERNAL MEDICINE

## 2025-03-04 PROCEDURE — 5A09557 ASSISTANCE WITH RESPIRATORY VENTILATION, GREATER THAN 96 CONSECUTIVE HOURS, CONTINUOUS POSITIVE AIRWAY PRESSURE: ICD-10-PCS | Performed by: INTERNAL MEDICINE

## 2025-03-04 PROCEDURE — 2500000003 HC RX 250 WO HCPCS: Performed by: STUDENT IN AN ORGANIZED HEALTH CARE EDUCATION/TRAINING PROGRAM

## 2025-03-04 PROCEDURE — 85027 COMPLETE CBC AUTOMATED: CPT

## 2025-03-04 PROCEDURE — 6360000002 HC RX W HCPCS

## 2025-03-04 PROCEDURE — 71045 X-RAY EXAM CHEST 1 VIEW: CPT

## 2025-03-04 PROCEDURE — 94761 N-INVAS EAR/PLS OXIMETRY MLT: CPT

## 2025-03-04 PROCEDURE — 51798 US URINE CAPACITY MEASURE: CPT

## 2025-03-04 PROCEDURE — 94640 AIRWAY INHALATION TREATMENT: CPT

## 2025-03-04 PROCEDURE — 80053 COMPREHEN METABOLIC PANEL: CPT

## 2025-03-04 PROCEDURE — 2700000000 HC OXYGEN THERAPY PER DAY

## 2025-03-04 PROCEDURE — 2000000000 HC ICU R&B

## 2025-03-04 PROCEDURE — 94660 CPAP INITIATION&MGMT: CPT

## 2025-03-04 PROCEDURE — 82803 BLOOD GASES ANY COMBINATION: CPT

## 2025-03-04 PROCEDURE — 94003 VENT MGMT INPAT SUBQ DAY: CPT

## 2025-03-04 RX ORDER — METOPROLOL TARTRATE 1 MG/ML
5 INJECTION, SOLUTION INTRAVENOUS EVERY 6 HOURS PRN
Status: DISCONTINUED | OUTPATIENT
Start: 2025-03-04 | End: 2025-03-10

## 2025-03-04 RX ORDER — LEVETIRACETAM 500 MG/5ML
750 INJECTION, SOLUTION, CONCENTRATE INTRAVENOUS EVERY 12 HOURS
Status: DISCONTINUED | OUTPATIENT
Start: 2025-03-04 | End: 2025-03-09

## 2025-03-04 RX ADMIN — METOCLOPRAMIDE 10 MG: 5 INJECTION, SOLUTION INTRAMUSCULAR; INTRAVENOUS at 03:24

## 2025-03-04 RX ADMIN — DILTIAZEM HYDROCHLORIDE 60 MG: 60 TABLET ORAL at 05:17

## 2025-03-04 RX ADMIN — DEXMEDETOMIDINE HYDROCHLORIDE 1.4 MCG/KG/HR: 4 INJECTION, SOLUTION INTRAVENOUS at 17:34

## 2025-03-04 RX ADMIN — ALBUTEROL SULFATE 2.5 MG: 2.5 SOLUTION RESPIRATORY (INHALATION) at 08:03

## 2025-03-04 RX ADMIN — DEXMEDETOMIDINE HYDROCHLORIDE 1.4 MCG/KG/HR: 4 INJECTION, SOLUTION INTRAVENOUS at 11:10

## 2025-03-04 RX ADMIN — DEXMEDETOMIDINE HYDROCHLORIDE 1 MCG/KG/HR: 4 INJECTION, SOLUTION INTRAVENOUS at 03:42

## 2025-03-04 RX ADMIN — LEVETIRACETAM 750 MG: 100 INJECTION INTRAVENOUS at 22:54

## 2025-03-04 RX ADMIN — ALBUTEROL SULFATE 2.5 MG: 2.5 SOLUTION RESPIRATORY (INHALATION) at 11:27

## 2025-03-04 RX ADMIN — FENTANYL CITRATE 50 MCG: 50 INJECTION, SOLUTION INTRAMUSCULAR; INTRAVENOUS at 03:47

## 2025-03-04 RX ADMIN — METOCLOPRAMIDE 10 MG: 5 INJECTION, SOLUTION INTRAMUSCULAR; INTRAVENOUS at 21:23

## 2025-03-04 RX ADMIN — DEXMEDETOMIDINE HYDROCHLORIDE 1.2 MCG/KG/HR: 4 INJECTION, SOLUTION INTRAVENOUS at 07:50

## 2025-03-04 RX ADMIN — INSULIN GLARGINE 10 UNITS: 100 INJECTION, SOLUTION SUBCUTANEOUS at 08:50

## 2025-03-04 RX ADMIN — METOCLOPRAMIDE 10 MG: 5 INJECTION, SOLUTION INTRAMUSCULAR; INTRAVENOUS at 14:05

## 2025-03-04 RX ADMIN — DEXMEDETOMIDINE HYDROCHLORIDE 1.4 MCG/KG/HR: 4 INJECTION, SOLUTION INTRAVENOUS at 20:53

## 2025-03-04 RX ADMIN — METOCLOPRAMIDE 10 MG: 5 INJECTION, SOLUTION INTRAMUSCULAR; INTRAVENOUS at 08:50

## 2025-03-04 RX ADMIN — SODIUM CHLORIDE, PRESERVATIVE FREE 10 ML: 5 INJECTION INTRAVENOUS at 08:51

## 2025-03-04 RX ADMIN — ALBUTEROL SULFATE 2.5 MG: 2.5 SOLUTION RESPIRATORY (INHALATION) at 15:32

## 2025-03-04 RX ADMIN — THIAMINE HYDROCHLORIDE 200 MG: 100 INJECTION, SOLUTION INTRAMUSCULAR; INTRAVENOUS at 11:16

## 2025-03-04 RX ADMIN — SODIUM CHLORIDE, PRESERVATIVE FREE 10 ML: 5 INJECTION INTRAVENOUS at 21:23

## 2025-03-04 RX ADMIN — DEXMEDETOMIDINE HYDROCHLORIDE 1.2 MCG/KG/HR: 4 INJECTION, SOLUTION INTRAVENOUS at 14:06

## 2025-03-04 RX ADMIN — ENOXAPARIN SODIUM 40 MG: 100 INJECTION SUBCUTANEOUS at 08:49

## 2025-03-04 RX ADMIN — FENTANYL CITRATE 50 MCG: 50 INJECTION, SOLUTION INTRAMUSCULAR; INTRAVENOUS at 05:47

## 2025-03-04 RX ADMIN — ALBUTEROL SULFATE 2.5 MG: 2.5 SOLUTION RESPIRATORY (INHALATION) at 20:04

## 2025-03-04 RX ADMIN — SODIUM CHLORIDE, PRESERVATIVE FREE 10 ML: 5 INJECTION INTRAVENOUS at 21:24

## 2025-03-04 RX ADMIN — LEVETIRACETAM 750 MG: 100 INJECTION INTRAVENOUS at 11:11

## 2025-03-04 RX ADMIN — MIDAZOLAM HYDROCHLORIDE 2 MG: 1 INJECTION, SOLUTION INTRAMUSCULAR; INTRAVENOUS at 05:17

## 2025-03-04 RX ADMIN — PANTOPRAZOLE SODIUM 40 MG: 40 INJECTION, POWDER, LYOPHILIZED, FOR SOLUTION INTRAVENOUS at 08:50

## 2025-03-04 RX ADMIN — SODIUM CHLORIDE, PRESERVATIVE FREE 10 ML: 5 INJECTION INTRAVENOUS at 08:50

## 2025-03-04 ASSESSMENT — PULMONARY FUNCTION TESTS
PIF_VALUE: 16
PIF_VALUE: 12

## 2025-03-04 ASSESSMENT — PAIN SCALES - GENERAL: PAINLEVEL_OUTOF10: 8

## 2025-03-04 NOTE — PLAN OF CARE
Problem: Confusion  Goal: Confusion, delirium, dementia, or psychosis is improved or at baseline  Description: INTERVENTIONS:  1. Assess for possible contributors to thought disturbance, including medications, impaired vision or hearing, underlying metabolic abnormalities, dehydration, psychiatric diagnoses, and notify attending LIP  2. [place] high risk fall precautions, as indicated  3. Provide frequent short contacts to provide reality reorientation, refocusing and direction  4. Decrease environmental stimuli, including noise as appropriate  5. Monitor and intervene to maintain adequate nutrition, hydration, elimination, sleep and activity  6. If unable to ensure safety without constant attention obtain sitter and review sitter guidelines with assigned personnel  7. Initiate Psychosocial CNS and Spiritual Care consult, as indicated  Outcome: Not Progressing  Flowsheets (Taken 3/4/2025 1543)  Effect of thought disturbance (confusion, delirium, dementia, or psychosis) are managed with adequate functional status:   Assess for contributors to thought disturbance, including medications, impaired vision or hearing, underlying metabolic abnormalities, dehydration, psychiatric diagnoses, notify LIP   [place] high risk fall precautions, as indicated   Provide frequent short contacts to provide reality reorientation, refocusing and direction   Decrease environmental stimuli, including noise as appropriate  Note: Pt continues on precedex 1.4 mcg/kg/hr     Problem: Safety - Adult  Goal: Free from fall injury  Outcome: Progressing  Flowsheets (Taken 3/4/2025 1543)  Free From Fall Injury: Instruct family/caregiver on patient safety     Problem: Respiratory - Adult  Goal: Achieves optimal ventilation and oxygenation  Outcome: Progressing  Flowsheets (Taken 3/4/2025 1543)  Achieves optimal ventilation and oxygenation:   Assess for changes in respiratory status   Assess for changes in mentation and behavior   Oxygen

## 2025-03-04 NOTE — PROGRESS NOTES
Pulmonary & Critical Care Medicine ICU Progress Note    CC: COPD respiratory failure    Events of Last 24 hours:   PS 5/5 yesterday for 6 hrs   Precedex 1.4 mcg/kg/hr     Vascular lines: IV: Left IJ -. R Midline     MV:   Vent Mode: AC/VC Resp Rate (Set): 0 bpm/Vt (Set, mL): 0 mL/ /FiO2 : 40 %  Recent Labs     25  1200 25  0459   PHART 7.439 7.375   XRP2LPV 50.8* 50.0*   PO2ART 64.2* 72.6*       IV:   sodium chloride      sodium chloride      dexmedeTOMIDine HCl in NaCl 1.2 mcg/kg/hr (25 0750)    sodium chloride      dextrose         Vitals:  Blood pressure 129/74, pulse 75, temperature 100 °F (37.8 °C), temperature source Bladder, resp. rate 24, height 1.702 m (5' 7.01\"), weight 83.9 kg (184 lb 15.5 oz), SpO2 98%.  on   Temp  Av °F (37.2 °C)  Min: 97.8 °F (36.6 °C)  Max: 100.4 °F (38 °C)    Intake/Output Summary (Last 24 hours) at 3/4/2025 0846  Last data filed at 3/4/2025 0524  Gross per 24 hour   Intake 2104.6 ml   Output 1765 ml   Net 339.6 ml     EXAM:  General: ill appearing.  Intubated.  Eyes: No sclera icterus. No conjunctival injection.  ENT: No discharge. ET tube in place  Neck: Trachea midline.   Resp: No accessory muscle use. Few crackles. No wheezing. + rhonchi.   CV: Regular  rate. Regular rhythm. No mumur or rub. + LE edema.   GI: Non-tender. Non-distended.   Skin: Warm and dry. No rash on exposed extremities.  M/S: No cyanosis. No clubbing.   Neuro: Intubated. + following commands.  Good cough.   Psych: Noncommunicative unable to obtain        Scheduled Meds:   metoprolol tartrate  25 mg Oral BID    QUEtiapine  50 mg Oral BID    polyethylene glycol  17 g Oral Daily    Gabapentin  200 mg Per NG tube BID    insulin glargine  10 Units SubCUTAneous BID    levETIRAcetam  750 mg Oral BID    thiamine  200 mg Oral Daily    sodium chloride flush  5-40 mL IntraVENous 2 times per day    lidocaine 1 % injection  50 mg IntraDERmal Once    metoclopramide  10 mg

## 2025-03-04 NOTE — PLAN OF CARE
Problem: Chronic Conditions and Co-morbidities  Goal: Patient's chronic conditions and co-morbidity symptoms are monitored and maintained or improved  3/3/2025 2001 by Maile Diego RN  Outcome: Progressing  3/3/2025 1845 by Xavi Jeong RN  Outcome: Progressing     Problem: Discharge Planning  Goal: Discharge to home or other facility with appropriate resources  Outcome: Progressing     Problem: Safety - Adult  Goal: Free from fall injury  Outcome: Progressing     Problem: Pain  Goal: Verbalizes/displays adequate comfort level or baseline comfort level  Outcome: Progressing     Problem: Respiratory - Adult  Goal: Achieves optimal ventilation and oxygenation  3/3/2025 2001 by Maile Diego RN  Outcome: Progressing  3/3/2025 1845 by Xavi Jeong RN  Outcome: Progressing     Problem: Cardiovascular - Adult  Goal: Maintains optimal cardiac output and hemodynamic stability  3/3/2025 2001 by Maile Diego RN  Outcome: Progressing  3/3/2025 1845 by Xavi Jeong RN  Outcome: Progressing     Problem: Skin/Tissue Integrity - Adult  Goal: Skin integrity remains intact  Description: 1.  Monitor for areas of redness and/or skin breakdown  2.  Assess vascular access sites hourly  3.  Every 4-6 hours minimum:  Change oxygen saturation probe site  4.  Every 4-6 hours:  If on nasal continuous positive airway pressure, respiratory therapy assess nares and determine need for appliance change or resting period  3/3/2025 2001 by Maile Diego RN  Outcome: Progressing  3/3/2025 1845 by Xavi Jeong RN  Outcome: Progressing  Goal: Oral mucous membranes remain intact  Outcome: Progressing     Problem: Musculoskeletal - Adult  Goal: Return mobility to safest level of function  Outcome: Progressing  Goal: Maintain proper alignment of affected body part  Outcome: Progressing  Goal: Return ADL status to a safe level of function  Outcome: Progressing     Problem: Gastrointestinal - Adult  Goal: Maintains or returns to

## 2025-03-04 NOTE — PROGRESS NOTES
Pt restless, anxious. Pulling IV, almost falling onto bed. Pt states he \"can't breath\". SpO2 99% on 4L/NC.     Placed pt back on bipap, SpO2 100%, pt less restless/anxious

## 2025-03-04 NOTE — PROGRESS NOTES
Progress Note    Admit Date:  1/30/2025    Interval history    Tolerated pressure support 5/5 for 6 hours yesterday.  Plans for spontaneous breathing trial and possible extubation today.  On Precedex.      Objective:   Patient Vitals for the past 4 hrs:   BP Temp Temp src Pulse Resp SpO2   03/04/25 1300 128/65 -- -- 69 20 98 %   03/04/25 1200 126/63 -- -- 74 21 97 %   03/04/25 1127 -- -- -- 77 21 97 %   03/04/25 1100 114/62 99.8 °F (37.7 °C) Bladder 81 23 96 %   03/04/25 1033 -- -- -- -- -- 96 %   03/04/25 1000 107/87 -- -- (!) 102 24 96 %          Intake/Output Summary (Last 24 hours) at 3/4/2025 1323  Last data filed at 3/4/2025 1224  Gross per 24 hour   Intake 2550.39 ml   Output 2105 ml   Net 445.39 ml       Physical Exam:  General: sleepy on precedex when I made rounds earlier this morning  Left IJ TLC . Appears to be not in any distress  Mucous Membranes:  Pink , anicteric  Neck: No JVD, no carotid bruit, no thyromegaly  Chest:  Clear to auscultation bilaterally, resolved wheeze  Cardiovascular:  RRR S1S2 heard, no murmurs or gallops  Abdomen:  Soft, obese +distended, non tender, no organomegaly, BS absent  Extremities: trace edema to ext improved   Distal pulses well felt  Neurological : sleepy    cations:  levETIRAcetam, 750 mg, Q12H  thiamine (B-1) 200 mg in sodium chloride 0.9 % 100 mL IVPB, 200 mg, Q24H  metoprolol tartrate, 25 mg, BID  QUEtiapine, 50 mg, BID  polyethylene glycol, 17 g, Daily  Gabapentin, 200 mg, BID  insulin glargine, 10 Units, BID  sodium chloride flush, 5-40 mL, 2 times per day  lidocaine 1 % injection, 50 mg, Once  metoclopramide, 10 mg, Q6H  insulin lispro, 0-16 Units, Q4H  dilTIAZem, 60 mg, 3 times per day  albuterol, 2.5 mg, 4x Daily RT  sodium chloride flush, 5-40 mL, 2 times per day  pantoprazole (PROTONIX) 40 mg in sodium chloride (PF) 0.9 % 10 mL injection, 40 mg, Daily  enoxaparin, 40 mg, Daily  allopurinol, 300 mg, Daily  vitamin D3, 5,000 Units, Daily  [Held by provider]  position.    Sputum- MRSA     Assessment/Plan:    Acute on chronic hypoxic and hypercarbic resp failure   Sec  to Influenza A and COPD exacerbation  Placed on BiPAP and admitted to the ICU  Used BiPAP on admission and improved some initially  Pred taper -and tamiflu completed     Resp failure worsened with development of SBO and --> now intubated requiring mechanical ventilation since  2/14  - Bronch 2/22  -given IV zosyn 8 days, vancomycin 10 days, sputum with MRSA    - repeat steroids , HHn given- improving slowly. Off Propofol. Precedex being weaned. PS in and AC in pm per Dr Romero.  - pulmonary managing   -Received multiple dose of Lasix during hospital stay.  -Tolerated pressure support 5/5.  Plans for SBT and possible extubation today.  Patient was extubated this afternoon.    Enteritis with partial SBO   Ct with enteritis and also has several metabolic issues like above  NG suction, NPO. Bowel rest given with no change  Surgical consulted   Enema and suppository with no benefit,  given  Zosyn and  diflucan vanc    started TPN support with no return of bowel function. Now SBO resolved.   Monitored electrolytes and replaced as needed  -advance TF to goal rate  UE doppler ordered to rule out DVT.- superficial thrombophlebitis seen.   - On TF now. Held for possible extubation today.      Acute metabolic encephalopathy resolved  - likely combination of recent steroids, flu infection and hypoxia and alcohol detox  - supportive care. Ct head neg  - MRI brain ordered but did not tolerate, was incomplete +motion artifact  - had agitation and confusion noted needing precedex   - on Keppra       Focal seizure involving LUE, history of childhood epilepsy   Neurology consulted  Seizure precautions  EEG with mild gen slowing  Started on keppra by neuro- switched to zonisamide temporarily to lower risk of possible behavioral issues. Zonisamide now held and Keppra re started    Normocytic anemia   - likely nutritional and

## 2025-03-04 NOTE — PROGRESS NOTES
03/04/25 0345   Patient Observation   Pulse (!) 105   Respirations 20   SpO2 91 %   Patient Observations ETT 7.5/ 25@lips   Breath Sounds   Respiratory Pattern Tachypneic   Right Upper Lobe Rhonchi   Right Middle Lobe Rhonchi   Right Lower Lobe Rhonchi   Left Upper Lobe Rhonchi   Vent Information   Equipment Changed Humidification   Vent Mode AC/VC   Ventilator Settings   FiO2  40 %   Vt (Set, mL) 400 mL   Resp Rate (Set) 20 bpm   PEEP/CPAP (cmH2O) 5   Vent Patient Data (Readings)   Vt (Measured) 456 mL   Peak Inspiratory Pressure (cmH2O) 16 cmH2O   Rate Measured 28 br/min   Minute Volume (L/min) 12.4 Liters   Peak Inspiratory Flow (lpm) 80 L/sec   Mean Airway Pressure (cmH2O) 9.1 cmH20   I:E Ratio 1:3.9   Flow Sensitivity 3 L/min   Backup Apnea On   Backup Rate 22 Breaths Per Minute   Backup Vt 400   Vent Alarm Settings   High Pressure (cmH2O) 55 cmH2O   Low Minute Volume (lpm) 4 L/min   High Minute Volume (lpm) 20 L/min   Low Exhaled Vt (ml) 250 mL   High Exhaled Vt (ml) 1000 mL   RR High (bpm) 40 br/min   Apnea (secs) 20 secs   Additional Respiratoray Assessments   Humidification Source Heated wire   Humidification Temp 36.5   Circuit Condensation Drained   Ambu Bag With Mask At Bedside Yes   Airway Clearance   Suction ET Tube   Subglottic Suction Done Yes   Suction Device Inline suction catheter;Fransiscouer   Sputum Method Obtained Endotracheal   Sputum Amount Large   Sputum Color/Odor White   Sputum Consistency Thick   ETT    Placement Date/Time: 02/14/25 1135   Present on Admission/Arrival: No  Placed By: RT  Placement Verified By: Auscultation;Colorimetric ETCO2 device;Chest X-ray  Preoxygenation: Yes  Airway Tube Size: 7.5 mm  Secured At: 24 cm  Measured From: Lips   Secured At 25 cm   Measured From Lips   ETT Placement Left  (moved by RT)   Secured By Commercial tube quispe   Site Assessment Dry   Cuff Pressure   (mov)   Tie/Quispe Changed No

## 2025-03-04 NOTE — PROGRESS NOTES
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     Weights Reviewed: Yes   Admission weight: 91.6 kg (201 lb 15.1 oz)   Wt Readings from Last 3 Encounters:   03/03/25 83.9 kg (184 lb 15.5 oz)   01/28/25 91.6 kg (202 lb)   01/04/25 99.3 kg (219 lb)     Intake & Output Reviewed: Yes     Intake/Output Summary (Last 24 hours) at 3/4/2025 0651  Last data filed at 3/4/2025 0524  Gross per 24 hour   Intake 2104.6 ml   Output 1765 ml   Net 339.6 ml       ECHOCARDIOGRAM Reviewed: Yes   Patient's Ejection Fraction (EF) is greater than 40%     Medications Reviewed: Yes   SCHEDULED HOSPITAL MEDICATIONS:   metoprolol tartrate  25 mg Oral BID    QUEtiapine  50 mg Oral BID    polyethylene glycol  17 g Oral Daily    Gabapentin  200 mg Per NG tube BID    insulin glargine  10 Units SubCUTAneous BID    levETIRAcetam  750 mg Oral BID    thiamine  200 mg Oral Daily    sodium chloride flush  5-40 mL IntraVENous 2 times per day    lidocaine 1 % injection  50 mg IntraDERmal Once    metoclopramide  10 mg IntraVENous Q6H    insulin lispro  0-16 Units SubCUTAneous Q4H    dilTIAZem  60 mg Oral 3 times per day    albuterol  2.5 mg Nebulization 4x Daily RT    sodium chloride flush  5-40 mL IntraVENous 2 times per day    pantoprazole (PROTONIX) 40 mg in sodium chloride (PF) 0.9 % 10 mL injection  40 mg IntraVENous Daily

## 2025-03-04 NOTE — PROGRESS NOTES
Patient extubated to bipap at this time. Will monitor.        03/04/25 1033   NIV Type   $NIV $Daily Charge   Suction Setup and Functional Yes   NIV Started/Stopped On   Equipment Type V60   Mode Bilevel   Mask Type Full face mask   Mask Size Extra large   Assessment   Heart Rate Source Monitor   SpO2 96 %   Level of Consciousness 0   Comfort Level Good   Using Accessory Muscles No   Mask Compliance Good   Skin Assessment Clean, dry, & intact   Skin Protection for O2 Device Yes   Orientation Middle   Location Nose   Breath Sounds   Breath Sounds Bilateral Rhonchi   Right Upper Lobe Rhonchi   Right Middle Lobe Rhonchi   Right Lower Lobe Rhonchi   Left Upper Lobe Rhonchi   Left Lower Lobe Rhonchi   Settings/Measurements   PIP Observed 16 cm H20   IPAP 15 cmH20   CPAP/EPAP 5 cmH2O   Vt (Measured) 545 mL   Rate Ordered 18   FiO2  50 %   I Time/ I Time % 0.9 s   Minute Volume (L/min) 13.4 Liters   Mask Leak (lpm) 42 lpm   Patient's Home Machine No   Alarm Settings   Alarms On Y   Low Pressure (cmH2O) 8 cmH2O   High Pressure (cmH2O) 30 cmH2O   Apnea (secs) 20 secs   RR High (bpm) 40 br/min   Oxygen Therapy/Pulse Ox   O2 Therapy Oxygen   O2 Device PAP (positive airway pressure)   Pulse Oximeter Device Mode Continuous   Pulse Oximeter Device Location Finger

## 2025-03-04 NOTE — PLAN OF CARE
Patient provided a COPD Educational Folder that includes the following materials:     [x]  Bypass Mobile Booklet: Managing your COPD  [x]  ALA: Getting the Most Out of Medication Delivery Devices  [x]  ALA: My COPD Action Plan  [x]  Better Breathers [facility]: [facility]   [x]  Smoking Cessation Classes  [x]  Outpatient Spiritual Care Services  [x]  Magnet: Signs of COPD    PATIENT/CAREGIVER TEACHING:   Level of patient/caregiver understanding able to:   [x] Verbalize understanding   [] Demonstrate understanding       [] Teach back        [] Needs reinforcement     []  Other:     Electronically signed by Maile Diego RN on 3/4/2025 at 6:52 AM

## 2025-03-04 NOTE — PROGRESS NOTES
Shift assessment, completed, see flow sheet. Pt RASS 0, following simple commands and nodding appropriately.     Intubated and sedated with a # 7.5 ETT, at 25 LL. On AC 20 / 400 /50 %/ 5. SR 75, /74 (89), SpO2 98%. Respirations are easy, even, and unlabored. Bilateral lung sounds rhonchi/diminished. L nare NGT in place at 65, with TF at 40 mL/hr. TF stopped for SBT.      2 PIV, WNL with precedex 1.4 mcg/kg/hr infusing.     Castrejon in place and patent with STAT lock. Bilateral soft wrist restraints in place for pt safety.     Call light within reach. Bed in lowest position. Bed alarm on.

## 2025-03-04 NOTE — PROGRESS NOTES
03/03/25 2331   Patient Observation   Pulse 72   Respirations 20   SpO2 98 %   Patient Observations ETT 7.5/ 25@lips   Breath Sounds   Respiratory Pattern Regular   Right Upper Lobe Rhonchi   Right Middle Lobe Rhonchi   Right Lower Lobe Rhonchi   Left Upper Lobe Rhonchi   Left Lower Lobe Rhonchi   Vent Information   Vent Mode AC/VC   Ventilator Settings   FiO2  40 %   Vt (Set, mL) 400 mL   Resp Rate (Set) 20 bpm   PEEP/CPAP (cmH2O) 5   Vent Patient Data (Readings)   Vt (Measured) 452 mL   Peak Inspiratory Pressure (cmH2O) 27 cmH2O   Rate Measured 20 br/min   Minute Volume (L/min) 9.23 Liters   Peak Inspiratory Flow (lpm) 80 L/sec   Mean Airway Pressure (cmH2O) 8.8 cmH20   I:E Ratio 1:4.3   Flow Sensitivity 3 L/min   Backup Apnea On   Backup Rate 22 Breaths Per Minute   Backup Vt 400   Vent Alarm Settings   High Pressure (cmH2O) 55 cmH2O   Low Minute Volume (lpm) 4 L/min   High Minute Volume (lpm) 20 L/min   Low Exhaled Vt (ml) 250 mL   High Exhaled Vt (ml) 1000 mL   RR High (bpm) 40 br/min   Apnea (secs) 20 secs   Additional Respiratoray Assessments   Humidification Source Heated wire   Humidification Temp 36.8   Circuit Condensation Drained   Ambu Bag With Mask At Bedside Yes   Airway Clearance   Suction ET Tube   Suction Device Inline suction catheter   Sputum Method Obtained Endotracheal   Sputum Amount Small   Sputum Color/Odor Clear   Sputum Consistency Thick   ETT    Placement Date/Time: 02/14/25 1135   Present on Admission/Arrival: No  Placed By: RT  Placement Verified By: Auscultation;Colorimetric ETCO2 device;Chest X-ray  Preoxygenation: Yes  Airway Tube Size: 7.5 mm  Secured At: 24 cm  Measured From: Lips   Secured At 25 cm   Measured From Lips   ETT Placement Center  (moved by RT)   Secured By Commercial tube quispe   Site Assessment Dry   Cuff Pressure   (mov)   Tie/Quispe Changed No

## 2025-03-04 NOTE — PROGRESS NOTES
Reassessment completed, see flowsheets. VSS. Restless on and off.   Pt remains on bipap s/p extubation.   Precedex 1.4 mcg/kg/hr.     All ICU Lines and monitoring remain in place. Bed locked in lowest position. Call light within reach.

## 2025-03-04 NOTE — PROGRESS NOTES
03/03/25 2021   Patient Observation   Pulse 82   Respirations 22   SpO2 100 %   Patient Observations ETT 7.5/ 25@lips   Breath Sounds   Respiratory Pattern Regular   Right Upper Lobe Rhonchi   Right Middle Lobe Rhonchi   Right Lower Lobe Rhonchi   Left Upper Lobe Rhonchi   Left Lower Lobe Rhonchi   Vent Information   Vent Mode AC/VC   Ventilator Settings   FiO2  40 %   Vt (Set, mL) 400 mL   Resp Rate (Set) 20 bpm   PEEP/CPAP (cmH2O) 5   Vent Patient Data (Readings)   Vt (Measured) 460 mL   Peak Inspiratory Pressure (cmH2O) 29 cmH2O   Rate Measured 22 br/min   Minute Volume (L/min) 10 Liters   Peak Inspiratory Flow (lpm) 80 L/sec   Mean Airway Pressure (cmH2O) 8.9 cmH20   I:E Ratio 1:4.3   Flow Sensitivity 3 L/min   Backup Apnea On   Backup Rate 22 Breaths Per Minute   Backup Vt 400   Vent Alarm Settings   High Pressure (cmH2O) 55 cmH2O   Low Minute Volume (lpm) 4 L/min   High Minute Volume (lpm) 20 L/min   Low Exhaled Vt (ml) 250 mL   High Exhaled Vt (ml) 1000 mL   RR High (bpm) 40 br/min   Apnea (secs) 20 secs   Additional Respiratoray Assessments   Humidification Source Heated wire   Humidification Temp 36.3   Circuit Condensation Drained   Ambu Bag With Mask At Bedside Yes   Airway Clearance   Suction ET Tube   Suction Device Inline suction catheter   Sputum Method Obtained Endotracheal   Sputum Amount Moderate   Sputum Color/Odor Clear;White   Sputum Consistency Thick   ETT    Placement Date/Time: 02/14/25 1135   Present on Admission/Arrival: No  Placed By: RT  Placement Verified By: Auscultation;Colorimetric ETCO2 device;Chest X-ray  Preoxygenation: Yes  Airway Tube Size: 7.5 mm  Secured At: 24 cm  Measured From: Lips   Secured At 25 cm   Measured From Lips   ETT Placement Right  (moved from left by RT)   Secured By Commercial tube quispe   Site Assessment Dry   Cuff Pressure   (mov)   Tie/Quispe Changed No

## 2025-03-04 NOTE — PROGRESS NOTES
Reassessment completed, see flowsheets,.VSS. currently on 4L/NC. Pt restless, agitated. Pulling at bedside monitor, IV, and bipap. Frequently orienting pt.     Precedex 1.4 mcg/kg/hr.    Telesitter ordered for pt safety.      All ICU Lines and monitoring remain in place. Bed locked in lowest position. Call light within reach.

## 2025-03-04 NOTE — PROGRESS NOTES
NGT remains clogged. NGT removed.     Spoke with Dr. España and pharmacy, orders to switch keppra and thiamine to IV with PRN IVP metoprolol

## 2025-03-04 NOTE — PROGRESS NOTES
Pt medicated again thrashing in bed , flopping head back and forth  in effort to remove ETT , kicking bed and banging bedrail

## 2025-03-04 NOTE — PROGRESS NOTES
Care rounds completed with Dr. España and multidisciplinary team. Reviewed labs, meds, VS, assessment, & plan of care for today. See dictated note and new orders for details.     Extubate to bipap  Bipap 15/5  Keep precedex on board for agitation  If can unclog NGT, keep for medication use

## 2025-03-04 NOTE — PROGRESS NOTES
RT Inhaler-Nebulizer Bronchodilator Protocol Note    There is a bronchodilator order in the chart from a provider indicating to follow the RT Bronchodilator Protocol and there is an “Initiate RT Inhaler-Nebulizer Bronchodilator Protocol” order as well (see protocol at bottom of note).    CXR Findings:  XR CHEST PORTABLE    Result Date: 3/3/2025  1. Slight interval worsening of a right base opacity. 2. Endotracheal tube and nasogastric tube in place.     XR CHEST PORTABLE    Result Date: 3/3/2025  1. Stable lines and tubes. 2. Stable bilateral airspace opacities.       The findings from the last RT Protocol Assessment were as follows:   History Pulmonary Disease: (P) Chronic pulmonary disease  Respiratory Pattern: (P) Dyspnea on exertion or RR 21-25 bpm  Breath Sounds: (P) Inspiratory and expiratory or bilateral wheezing and/or rhonchi  Cough: (P) Strong, productive  Indication for Bronchodilator Therapy: (P) Decreased or absent breath sounds  Bronchodilator Assessment Score: (P) 11    Aerosolized bronchodilator medication orders have been revised according to the RT Inhaler-Nebulizer Bronchodilator Protocol below.    Respiratory Therapist to perform RT Therapy Protocol Assessment initially then follow the protocol.  Repeat RT Therapy Protocol Assessment PRN for score 0-3 or on second treatment, BID, and PRN for scores above 3.    No Indications - adjust the frequency to every 6 hours PRN wheezing or bronchospasm, if no treatments needed after 48 hours then discontinue using Per Protocol order mode.     If indication present, adjust the RT bronchodilator orders based on the Bronchodilator Assessment Score as indicated below.  Use Inhaler orders unless patient has one or more of the following: on home nebulizer, not able to hold breath for 10 seconds, is not alert and oriented, cannot activate and use MDI correctly, or respiratory rate 25 breaths per minute or more, then use the equivalent nebulizer order(s) with same

## 2025-03-04 NOTE — PROGRESS NOTES
Removed pt chavira catheter at 1700. Placed external catheter to CLWS. Educated pt on urinating with in the next 6 hours. Pt confused, and currently not understanding of situation.

## 2025-03-04 NOTE — PROGRESS NOTES
L  nare NGT clogged. Unable to pull back nor flush tube. Instilled 1 ml clog zapper as it started to come out of NGT. Will re-evaluate in 1 hour.

## 2025-03-05 LAB
ALBUMIN SERPL-MCNC: 2.2 G/DL (ref 3.4–5)
ALBUMIN SERPL-MCNC: 2.4 G/DL (ref 3.4–5)
ALBUMIN/GLOB SERPL: 0.5 {RATIO} (ref 1.1–2.2)
ALP SERPL-CCNC: 184 U/L (ref 40–129)
ALP SERPL-CCNC: 186 U/L (ref 40–129)
ALT SERPL-CCNC: 16 U/L (ref 10–40)
ALT SERPL-CCNC: 17 U/L (ref 10–40)
ANION GAP SERPL CALCULATED.3IONS-SCNC: 6 MMOL/L (ref 3–16)
AST SERPL-CCNC: 27 U/L (ref 15–37)
AST SERPL-CCNC: 31 U/L (ref 15–37)
BASE EXCESS BLDV CALC-SCNC: 2.3 MMOL/L (ref -3–3)
BILIRUB DIRECT SERPL-MCNC: <0.1 MG/DL (ref 0–0.3)
BILIRUB INDIRECT SERPL-MCNC: 0.2 MG/DL (ref 0–1)
BILIRUB SERPL-MCNC: 0.3 MG/DL (ref 0–1)
BILIRUB SERPL-MCNC: 0.4 MG/DL (ref 0–1)
BUN SERPL-MCNC: 14 MG/DL (ref 7–20)
CALCIUM SERPL-MCNC: 8.4 MG/DL (ref 8.3–10.6)
CHLORIDE SERPL-SCNC: 100 MMOL/L (ref 99–110)
CO2 BLDV-SCNC: 31 MMOL/L
CO2 SERPL-SCNC: 30 MMOL/L (ref 21–32)
COHGB MFR BLDV: 0.7 % (ref 0–1.5)
CREAT SERPL-MCNC: 0.6 MG/DL (ref 0.8–1.3)
DEPRECATED RDW RBC AUTO: 17.1 % (ref 12.4–15.4)
EKG ATRIAL RATE: 129 BPM
EKG DIAGNOSIS: NORMAL
EKG P-R INTERVAL: 176 MS
EKG Q-T INTERVAL: 314 MS
EKG QRS DURATION: 76 MS
EKG QTC CALCULATION (BAZETT): 460 MS
EKG R AXIS: 33 DEGREES
EKG T AXIS: 45 DEGREES
EKG VENTRICULAR RATE: 129 BPM
GFR SERPLBLD CREATININE-BSD FMLA CKD-EPI: >90 ML/MIN/{1.73_M2}
GLUCOSE BLD-MCNC: 101 MG/DL (ref 70–99)
GLUCOSE BLD-MCNC: 114 MG/DL (ref 70–99)
GLUCOSE BLD-MCNC: 81 MG/DL (ref 70–99)
GLUCOSE BLD-MCNC: 85 MG/DL (ref 70–99)
GLUCOSE BLD-MCNC: 90 MG/DL (ref 70–99)
GLUCOSE BLD-MCNC: 91 MG/DL (ref 70–99)
GLUCOSE BLD-MCNC: 93 MG/DL (ref 70–99)
GLUCOSE SERPL-MCNC: 92 MG/DL (ref 70–99)
HCO3 BLDV-SCNC: 29.4 MMOL/L (ref 23–29)
HCT VFR BLD AUTO: 27.8 % (ref 40.5–52.5)
HGB BLD-MCNC: 9.1 G/DL (ref 13.5–17.5)
MCH RBC QN AUTO: 29.9 PG (ref 26–34)
MCHC RBC AUTO-ENTMCNC: 32.7 G/DL (ref 31–36)
MCV RBC AUTO: 91.4 FL (ref 80–100)
METHGB MFR BLDV: 0.3 %
O2 CT VFR BLDV CALC: 13 VOL %
O2 THERAPY: ABNORMAL
PCO2 BLDV: 58.9 MMHG (ref 40–50)
PERFORMED ON: ABNORMAL
PERFORMED ON: ABNORMAL
PERFORMED ON: NORMAL
PH BLDV: 7.32 [PH] (ref 7.35–7.45)
PLATELET # BLD AUTO: 489 K/UL (ref 135–450)
PMV BLD AUTO: 7.8 FL (ref 5–10.5)
PO2 BLDV: 64.6 MMHG (ref 25–40)
POTASSIUM SERPL-SCNC: 3.6 MMOL/L (ref 3.5–5.1)
PROT SERPL-MCNC: 6.7 G/DL (ref 6.4–8.2)
PROT SERPL-MCNC: 7.2 G/DL (ref 6.4–8.2)
RBC # BLD AUTO: 3.04 M/UL (ref 4.2–5.9)
SAO2 % BLDV: 90 %
SODIUM SERPL-SCNC: 136 MMOL/L (ref 136–145)
WBC # BLD AUTO: 7.8 K/UL (ref 4–11)

## 2025-03-05 PROCEDURE — 99233 SBSQ HOSP IP/OBS HIGH 50: CPT | Performed by: INTERNAL MEDICINE

## 2025-03-05 PROCEDURE — 6360000002 HC RX W HCPCS: Performed by: INTERNAL MEDICINE

## 2025-03-05 PROCEDURE — 94660 CPAP INITIATION&MGMT: CPT

## 2025-03-05 PROCEDURE — 6360000002 HC RX W HCPCS

## 2025-03-05 PROCEDURE — 94761 N-INVAS EAR/PLS OXIMETRY MLT: CPT

## 2025-03-05 PROCEDURE — 94640 AIRWAY INHALATION TREATMENT: CPT

## 2025-03-05 PROCEDURE — 2700000000 HC OXYGEN THERAPY PER DAY

## 2025-03-05 PROCEDURE — 97530 THERAPEUTIC ACTIVITIES: CPT

## 2025-03-05 PROCEDURE — 82803 BLOOD GASES ANY COMBINATION: CPT

## 2025-03-05 PROCEDURE — 2500000003 HC RX 250 WO HCPCS: Performed by: STUDENT IN AN ORGANIZED HEALTH CARE EDUCATION/TRAINING PROGRAM

## 2025-03-05 PROCEDURE — 94669 MECHANICAL CHEST WALL OSCILL: CPT

## 2025-03-05 PROCEDURE — 93005 ELECTROCARDIOGRAM TRACING: CPT | Performed by: INTERNAL MEDICINE

## 2025-03-05 PROCEDURE — 97162 PT EVAL MOD COMPLEX 30 MIN: CPT

## 2025-03-05 PROCEDURE — 2580000003 HC RX 258: Performed by: INTERNAL MEDICINE

## 2025-03-05 PROCEDURE — 36415 COLL VENOUS BLD VENIPUNCTURE: CPT

## 2025-03-05 PROCEDURE — 85027 COMPLETE CBC AUTOMATED: CPT

## 2025-03-05 PROCEDURE — 92526 ORAL FUNCTION THERAPY: CPT

## 2025-03-05 PROCEDURE — 97164 PT RE-EVAL EST PLAN CARE: CPT

## 2025-03-05 PROCEDURE — 2000000000 HC ICU R&B

## 2025-03-05 PROCEDURE — 2500000003 HC RX 250 WO HCPCS: Performed by: INTERNAL MEDICINE

## 2025-03-05 PROCEDURE — 97166 OT EVAL MOD COMPLEX 45 MIN: CPT

## 2025-03-05 PROCEDURE — 51798 US URINE CAPACITY MEASURE: CPT

## 2025-03-05 PROCEDURE — 80053 COMPREHEN METABOLIC PANEL: CPT

## 2025-03-05 PROCEDURE — 99291 CRITICAL CARE FIRST HOUR: CPT | Performed by: INTERNAL MEDICINE

## 2025-03-05 PROCEDURE — 51701 INSERT BLADDER CATHETER: CPT

## 2025-03-05 PROCEDURE — 93010 ELECTROCARDIOGRAM REPORT: CPT | Performed by: INTERNAL MEDICINE

## 2025-03-05 RX ORDER — HALOPERIDOL 5 MG/ML
5 INJECTION INTRAMUSCULAR EVERY 6 HOURS PRN
Status: DISCONTINUED | OUTPATIENT
Start: 2025-03-05 | End: 2025-03-07

## 2025-03-05 RX ORDER — FUROSEMIDE 10 MG/ML
40 INJECTION INTRAMUSCULAR; INTRAVENOUS ONCE
Status: COMPLETED | OUTPATIENT
Start: 2025-03-05 | End: 2025-03-05

## 2025-03-05 RX ADMIN — HALOPERIDOL LACTATE 5 MG: 5 INJECTION, SOLUTION INTRAMUSCULAR at 22:40

## 2025-03-05 RX ADMIN — THIAMINE HYDROCHLORIDE 200 MG: 100 INJECTION, SOLUTION INTRAMUSCULAR; INTRAVENOUS at 12:57

## 2025-03-05 RX ADMIN — DEXMEDETOMIDINE HYDROCHLORIDE 1.4 MCG/KG/HR: 4 INJECTION, SOLUTION INTRAVENOUS at 03:10

## 2025-03-05 RX ADMIN — ALBUTEROL SULFATE 2.5 MG: 2.5 SOLUTION RESPIRATORY (INHALATION) at 11:37

## 2025-03-05 RX ADMIN — ALBUTEROL SULFATE 2.5 MG: 2.5 SOLUTION RESPIRATORY (INHALATION) at 20:07

## 2025-03-05 RX ADMIN — METOCLOPRAMIDE 10 MG: 5 INJECTION, SOLUTION INTRAMUSCULAR; INTRAVENOUS at 08:10

## 2025-03-05 RX ADMIN — DEXMEDETOMIDINE HYDROCHLORIDE 1.4 MCG/KG/HR: 4 INJECTION, SOLUTION INTRAVENOUS at 00:01

## 2025-03-05 RX ADMIN — ENOXAPARIN SODIUM 40 MG: 100 INJECTION SUBCUTANEOUS at 08:10

## 2025-03-05 RX ADMIN — SODIUM CHLORIDE, PRESERVATIVE FREE 10 ML: 5 INJECTION INTRAVENOUS at 21:44

## 2025-03-05 RX ADMIN — METOCLOPRAMIDE 10 MG: 5 INJECTION, SOLUTION INTRAMUSCULAR; INTRAVENOUS at 21:43

## 2025-03-05 RX ADMIN — SODIUM CHLORIDE, PRESERVATIVE FREE 10 ML: 5 INJECTION INTRAVENOUS at 08:35

## 2025-03-05 RX ADMIN — ALBUTEROL SULFATE 2.5 MG: 2.5 SOLUTION RESPIRATORY (INHALATION) at 07:52

## 2025-03-05 RX ADMIN — SODIUM CHLORIDE, PRESERVATIVE FREE 10 ML: 5 INJECTION INTRAVENOUS at 08:11

## 2025-03-05 RX ADMIN — LEVETIRACETAM 750 MG: 100 INJECTION INTRAVENOUS at 22:41

## 2025-03-05 RX ADMIN — ALBUTEROL SULFATE 2.5 MG: 2.5 SOLUTION RESPIRATORY (INHALATION) at 16:12

## 2025-03-05 RX ADMIN — METOPROLOL TARTRATE 5 MG: 5 INJECTION INTRAVENOUS at 10:07

## 2025-03-05 RX ADMIN — METOCLOPRAMIDE 10 MG: 5 INJECTION, SOLUTION INTRAMUSCULAR; INTRAVENOUS at 02:39

## 2025-03-05 RX ADMIN — METOPROLOL TARTRATE 5 MG: 5 INJECTION INTRAVENOUS at 21:44

## 2025-03-05 RX ADMIN — PANTOPRAZOLE SODIUM 40 MG: 40 INJECTION, POWDER, LYOPHILIZED, FOR SOLUTION INTRAVENOUS at 08:10

## 2025-03-05 RX ADMIN — DEXMEDETOMIDINE HYDROCHLORIDE 1.2 MCG/KG/HR: 4 INJECTION, SOLUTION INTRAVENOUS at 06:18

## 2025-03-05 RX ADMIN — METOPROLOL TARTRATE 5 MG: 5 INJECTION INTRAVENOUS at 16:37

## 2025-03-05 RX ADMIN — HALOPERIDOL LACTATE 5 MG: 5 INJECTION, SOLUTION INTRAMUSCULAR at 13:29

## 2025-03-05 RX ADMIN — FUROSEMIDE 40 MG: 10 INJECTION, SOLUTION INTRAMUSCULAR; INTRAVENOUS at 08:25

## 2025-03-05 RX ADMIN — LEVETIRACETAM 750 MG: 100 INJECTION INTRAVENOUS at 10:07

## 2025-03-05 ASSESSMENT — PAIN SCALES - GENERAL: PAINLEVEL_OUTOF10: 0

## 2025-03-05 NOTE — PROGRESS NOTES
Progress Note    Admit Date:  1/30/2025    Interval history    Tolerated pressure support 5/5 for 6 hours yesterday.  Plans for spontaneous breathing trial and possible extubation today.  On Precedex.    3/5-extubated on 3/4/2025.  Placed on BiPAP.  Has been intermittently confused.  Was on Precedex.  Has had some intermittent agitation.  When I saw the patient his eyes are open.   at bedside.  He has a good cough.      Objective:   Patient Vitals for the past 4 hrs:   BP Temp Temp src Pulse Resp SpO2   03/05/25 1200 128/69 -- -- (!) 136 26 98 %   03/05/25 1130 -- -- -- (!) 133 26 94 %   03/05/25 1100 (!) 133/56 -- -- (!) 116 28 100 %   03/05/25 1035 -- -- -- (!) 112 24 97 %   03/05/25 1000 (!) 164/61 -- -- (!) 117 30 96 %   03/05/25 0937 -- 98 °F (36.7 °C) Temporal (!) 111 24 96 %   03/05/25 0936 -- -- -- (!) 113 26 96 %   03/05/25 0935 -- -- -- (!) 115 (!) 32 96 %   03/05/25 0934 -- -- -- (!) 115 22 99 %   03/05/25 0933 -- -- -- (!) 115 24 96 %   03/05/25 0932 -- -- -- (!) 114 25 96 %          Intake/Output Summary (Last 24 hours) at 3/5/2025 1328  Last data filed at 3/5/2025 1145  Gross per 24 hour   Intake 564.07 ml   Output 2490 ml   Net -1925.93 ml       Physical Exam:  General: Awake and alert.  Confused.  Left IJ TLC .   Mucous Membranes:  Pink , anicteric  Neck: No JVD, no carotid bruit, no thyromegaly  Chest: No accessory muscle use.  Minimal wheezing.  Few rhonchi.  Cardiovascular:  RRR S1S2 heard, no murmurs or gallops  Abdomen:  Soft, obese +distended, non tender, no organomegaly, BS absent  Extremities: trace edema to ext improved   Distal pulses well felt  Neurological awake and alert but confused.  Moves all 4 extremities.    cations:  levETIRAcetam, 750 mg, Q12H  thiamine (B-1) 200 mg in sodium chloride 0.9 % 100 mL IVPB, 200 mg, Q24H  metoprolol tartrate, 25 mg, BID  QUEtiapine, 50 mg, BID  polyethylene glycol, 17 g, Daily  Gabapentin, 200 mg, BID  sodium chloride flush, 5-40 mL, 2  and history, an infectious pattern of   enteritis is suspected.   2. No evidence of pneumatosis or perforation on the current exam.   3. Slight increase in size of a small fluid collection adjacent to the   terminal ileum. A small developing abscess is likely.   4. Severe bladder mucosal thickening. Correlate with urinalysis.   5. Worsening dependent airspace opacities in the lower lobes. Atelectasis or   edema may be considered. Developing pneumonitis cannot be excluded.         XR CHEST PORTABLE   Final Result   Interstitial thickening is likely due to mild pulmonary edema.         XR ABDOMEN (2 VIEWS)   Final Result   Persistent but improving ileus pattern.         XR CHEST PORTABLE   Final Result   1. Persistent mild blunting of left costophrenic angle which could be related   to pleural thickening or trace pleural effusion.  There are no acute findings   elsewhere in the chest.   2. Gastric tube in place which extends a short distance in the stomach with   the side port 2-3 cm above the domingo.  Advancement is recommended.         XR CHEST PORTABLE   Final Result   1. Bibasilar atelectasis.   2. Possible trace left pleural effusion.         XR ABDOMEN (2 VIEWS)   Final Result   Stable to slightly improved small bowel dilation.  No free air.         MRI BRAIN W WO CONTRAST         CT HEAD WO CONTRAST   Final Result   No acute intracranial abnormality.      Minimal fluid in the left maxillary sinus and left mastoid air cells.         XR ABDOMEN (2 VIEWS)   Final Result   No significant change in appearance of a suspected distal small bowel   obstruction, without evidence of free air.         XR ABDOMEN (2 VIEWS)   Final Result   Dilated loops of small bowel appear unchanged from the prior study compatible   with partial small bowel obstruction.         XR ABDOMEN (2 VIEWS)   Final Result   1. Unchanged dilated loops of small bowel.         XR ABDOMEN FOR NG/OG/NE TUBE PLACEMENT   Final Result   NG tube in place,

## 2025-03-05 NOTE — PROGRESS NOTES
RT Inhaler-Nebulizer Bronchodilator Protocol Note    There is a bronchodilator order in the chart from a provider indicating to follow the RT Bronchodilator Protocol and there is an “Initiate RT Inhaler-Nebulizer Bronchodilator Protocol” order as well (see protocol at bottom of note).    CXR Findings:  XR CHEST PORTABLE    Result Date: 3/4/2025  Slightly improved perihilar opacities centrally.       The findings from the last RT Protocol Assessment were as follows:   History Pulmonary Disease: Chronic pulmonary disease  Respiratory Pattern: Dyspnea on exertion or RR 21-25 bpm  Breath Sounds: Slightly diminished and/or crackles  Cough: Strong, productive  Indication for Bronchodilator Therapy: Decreased or absent breath sounds  Bronchodilator Assessment Score: 7    Aerosolized bronchodilator medication orders have been revised according to the RT Inhaler-Nebulizer Bronchodilator Protocol below.    Respiratory Therapist to perform RT Therapy Protocol Assessment initially then follow the protocol.  Repeat RT Therapy Protocol Assessment PRN for score 0-3 or on second treatment, BID, and PRN for scores above 3.    No Indications - adjust the frequency to every 6 hours PRN wheezing or bronchospasm, if no treatments needed after 48 hours then discontinue using Per Protocol order mode.     If indication present, adjust the RT bronchodilator orders based on the Bronchodilator Assessment Score as indicated below.  Use Inhaler orders unless patient has one or more of the following: on home nebulizer, not able to hold breath for 10 seconds, is not alert and oriented, cannot activate and use MDI correctly, or respiratory rate 25 breaths per minute or more, then use the equivalent nebulizer order(s) with same Frequency and PRN reasons based on the score.  If a patient is on this medication at home then do not decrease Frequency below that used at home.    0-3 - enter or revise RT bronchodilator order(s) to equivalent RT

## 2025-03-05 NOTE — PROGRESS NOTES
03/04/25 4546   NIV Type   NIV Started/Stopped On   Equipment Type v60   Mode Bilevel   Mask Type Full face mask   Mask Size Extra large   Assessment   Pulse 67   Respirations 22   SpO2 99 %   Comfort Level Good   Using Accessory Muscles No   Mask Compliance Good   Skin Assessment Clean, dry, & intact   Skin Protection for O2 Device Yes   Orientation Middle   Location Nose   Intervention(s) Skin Barrier   Breath Sounds   Respiratory Pattern Regular   Right Upper Lobe Rhonchi   Right Middle Lobe Rhonchi   Right Lower Lobe Rhonchi   Left Upper Lobe Rhonchi   Left Lower Lobe Rhonchi   Settings/Measurements   PIP Observed 16 cm H20   IPAP 15 cmH20   CPAP/EPAP 5 cmH2O   Vt (Measured) 542 mL   Rate Ordered 18   Insp Rise Time (%) 1 %   FiO2  40 %   I Time/ I Time % 0.9 s   Minute Volume (L/min) 12 Liters   Mask Leak (lpm) 46 lpm   Patient's Home Machine No   Alarm Settings   Alarms On Y   Low Pressure (cmH2O) 5 cmH2O   High Pressure (cmH2O) 40 cmH2O   Apnea (secs) 20 secs   RR Low (bpm) 12   RR High (bpm) 40 br/min

## 2025-03-05 NOTE — PROGRESS NOTES
Pulmonary & Critical Care Medicine ICU Progress Note    CC: COPD respiratory failure    Events of Last 24 hours:   Extubated to BiPAP yesterday.  Mostly took his Bipap off. Has a strong cough. Pt is confused. Now tolerating.   Precedex at 0.8  Vascular lines: IV: Left IJ -. R Midline     MV: -3/4/25  Vent Mode: AC/VC Resp Rate (Set): 0 bpm/Vt (Set, mL): 0 mL/ /FiO2 : 40 %  Recent Labs     25  1200 25  0459   PHART 7.439 7.375   PTO8EEK 50.8* 50.0*   PO2ART 64.2* 72.6*       IV:   sodium chloride      sodium chloride      dexmedeTOMIDine HCl in NaCl 1 mcg/kg/hr (25 0652)    sodium chloride      dextrose         Vitals:  Blood pressure 123/66, pulse (!) 102, temperature 97.1 °F (36.2 °C), temperature source Temporal, resp. rate 20, height 1.702 m (5' 7.01\"), weight 84.3 kg (185 lb 13.6 oz), SpO2 90%.  on   Temp  Av.9 °F (36.6 °C)  Min: 97.1 °F (36.2 °C)  Max: 99.8 °F (37.7 °C)    Intake/Output Summary (Last 24 hours) at 3/5/2025 0904  Last data filed at 3/5/2025 0652  Gross per 24 hour   Intake 870.86 ml   Output 1015 ml   Net -144.14 ml     EXAM:  General: ill appearing.    Eyes: No sclera icterus. No conjunctival injection.  ENT: No discharge.   Neck: Trachea midline.   Resp: No accessory muscle use. Few crackles. No wheezing. + rhonchi.   CV: Regular  rate. Regular rhythm. No mumur or rub. + LE edema.   GI: Non-tender. Non-distended.   Skin: Warm and dry. No rash on exposed extremities.  M/S: No cyanosis. No clubbing.   Neuro: intermittently + following commands.  Good cough.   Psych: mild agiation. ALert to name        Scheduled Meds:   levETIRAcetam  750 mg IntraVENous Q12H    thiamine (B-1) 200 mg in sodium chloride 0.9 % 100 mL IVPB  200 mg IntraVENous Q24H    metoprolol tartrate  25 mg Oral BID    QUEtiapine  50 mg Oral BID    polyethylene glycol  17 g Oral Daily    Gabapentin  200 mg Per NG tube BID    insulin glargine  10 Units SubCUTAneous BID    sodium chloride

## 2025-03-05 NOTE — PROGRESS NOTES
Additive QT interval prolongation may occur with Ondansetron, Quetiapine, and Haloperidol. Arrhythmias occur most commonly when QTC >500.  Patient's QTC is 455.  The combination of Haloperidol and Metoclopramide increases the risk of EPS    Monitor for signs and symptoms.    Samuel Cid R.Ph.3/5/[id number]:11 PM

## 2025-03-05 NOTE — PROGRESS NOTES
03/05/25 0333   NIV Type   NIV Started/Stopped On   Equipment Type v60   Mode Bilevel   Mask Type Full face mask   Mask Size Extra large   Assessment   Pulse 72   Respirations 25   SpO2 100 %   Comfort Level Good   Using Accessory Muscles No   Mask Compliance Good   Skin Assessment Clean, dry, & intact   Skin Protection for O2 Device Yes   Orientation Middle   Location Nose   Breath Sounds   Respiratory Pattern Regular   Right Upper Lobe Diminished   Right Middle Lobe Diminished   Right Lower Lobe Diminished   Left Upper Lobe Diminished   Left Lower Lobe Diminished   Settings/Measurements   PIP Observed 16 cm H20   IPAP 15 cmH20   CPAP/EPAP 5 cmH2O   Vt (Measured) 622 mL   Rate Ordered 18   Insp Rise Time (%) 1 %   FiO2  40 %   I Time/ I Time % 0.9 s   Minute Volume (L/min) 13.7 Liters   Mask Leak (lpm) 17 lpm   Patient's Home Machine No   Alarm Settings   Alarms On Y   Low Pressure (cmH2O) 5 cmH2O   High Pressure (cmH2O) 40 cmH2O   Apnea (secs) 20 secs   RR Low (bpm) 12   RR High (bpm) 40 br/min

## 2025-03-05 NOTE — PROGRESS NOTES
Inpatient Occupational Therapy Evaluation & Treatment    Unit: ICU  Date:  3/5/2025  Patient Name:    Sandor Early  Admitting diagnosis:  Hypomagnesemia [E83.42]  Influenza A [J10.1]  COPD exacerbation (HCC) [J44.1]  Respiratory failure with hypoxia (HCC) [J96.91]  Alcohol use disorder [F10.90]  Acute on chronic respiratory failure with hypoxia and hypercapnia (HCC) [J96.21, J96.22]  Acute respiratory failure (HCC) [J96.00]  Admit Date:  1/30/2025  Precautions/Restrictions/WB Status/ Lines/ Wounds/ Oxygen: Fall risk, Bed/chair alarm, Lines (IV, Supplemental O2 (5L), and external catheter), Telemetry, Continuous pulse oximetry, Telesitter, and Isolation Precautions: Contact  Pt also has a sitter- pt very restless in bed    Pt seen for cotreatment this date due to complexity of condition    Treatment Time:  0982-2317  Treatment Number:  1  Timed Code Treatment Minutes: 10 minutes  Total Treatment Minutes:  28  minutes    Patient Goals for Therapy: \"not stated \"          Discharge Recommendations: SNF  DME needs for discharge: Defer to facility       Therapy recommendations for staff:   Assist of 2 for repositioning in bed    History of Present Illness: per H&P   Sandor is a 69 y.o. male with a history of COPD on 4 L of O2 chronically, GERD, hyperlipidemia, alcohol liver cirrhosis, who presents to the emergency department by EMS for shortness of breath.  Patient reports that he has felt short of breath with increasing cough and fever over the past 3 to 4 days.  Upon EMS arrival patient was in moderate to severe respiratory distress O2 sat 88%.  He was placed on O2.  He received Solu-Medrol and DuoNeb.  EMS team stated that he was agitated trying to get out of the squad and complaining of diffuse pain back legs etc. he has a history of neuropathy.  They then gave him 10 mg of morphine.  Upon arrival patient is ill-appearing in respiratory distress poor skin color very tachypneic retractions.  He denies chest pain.

## 2025-03-05 NOTE — PROGRESS NOTES
Sitter at bedside  due to pt confused  and had to be put in soft wrist restraints  and being placed on  PAP

## 2025-03-05 NOTE — PROGRESS NOTES
Reassessment completed, see flowsheets. Continues on 5L/NC. Pt restless, agitated. Pulling at bedside monitor, IV, and oxygen.      Precedex remains off.      at bedside.       All ICU Lines and monitoring remain in place. Bed locked in lowest position. Call light within reach.

## 2025-03-05 NOTE — PROGRESS NOTES
RT Inhaler-Nebulizer Bronchodilator Protocol Note    There is a bronchodilator order in the chart from a provider indicating to follow the RT Bronchodilator Protocol and there is an “Initiate RT Inhaler-Nebulizer Bronchodilator Protocol” order as well (see protocol at bottom of note).    CXR Findings:  XR CHEST PORTABLE    Result Date: 3/4/2025  Slightly improved perihilar opacities centrally.     XR CHEST PORTABLE    Result Date: 3/4/2025  1. Slight interval worsening of a right base opacity. 2. Endotracheal tube and nasogastric tube in place.       The findings from the last RT Protocol Assessment were as follows:   History Pulmonary Disease: (P) Chronic pulmonary disease  Respiratory Pattern: (P) Dyspnea on exertion or RR 21-25 bpm  Breath Sounds: (P) Inspiratory and expiratory or bilateral wheezing and/or rhonchi  Cough: (P) Strong, productive  Indication for Bronchodilator Therapy: (P) Decreased or absent breath sounds  Bronchodilator Assessment Score: (P) 11    Aerosolized bronchodilator medication orders have been revised according to the RT Inhaler-Nebulizer Bronchodilator Protocol below.    Respiratory Therapist to perform RT Therapy Protocol Assessment initially then follow the protocol.  Repeat RT Therapy Protocol Assessment PRN for score 0-3 or on second treatment, BID, and PRN for scores above 3.    No Indications - adjust the frequency to every 6 hours PRN wheezing or bronchospasm, if no treatments needed after 48 hours then discontinue using Per Protocol order mode.     If indication present, adjust the RT bronchodilator orders based on the Bronchodilator Assessment Score as indicated below.  Use Inhaler orders unless patient has one or more of the following: on home nebulizer, not able to hold breath for 10 seconds, is not alert and oriented, cannot activate and use MDI correctly, or respiratory rate 25 breaths per minute or more, then use the equivalent nebulizer order(s) with same Frequency and PRN

## 2025-03-05 NOTE — PROGRESS NOTES
Inpatient Physical Therapy Re-Evaluation    Unit: ICU  Date:  3/5/2025  Patient Name:    Sandor Early  Admitting diagnosis:  Hypomagnesemia [E83.42]  Influenza A [J10.1]  COPD exacerbation (HCC) [J44.1]  Respiratory failure with hypoxia (HCC) [J96.91]  Alcohol use disorder [F10.90]  Acute on chronic respiratory failure with hypoxia and hypercapnia (HCC) [J96.21, J96.22]  Acute respiratory failure (HCC) [J96.00]  Admit Date:  1/30/2025  Precautions/Restrictions/WB Status/ Lines/ Wounds/ Oxygen: Fall risk, Lines (IV, Supplemental O2 (5L), and external catheter), Telemetry, Continuous pulse oximetry, Telesitter, and 1:1 Supervision    Pt seen for cotreatment this date due to complexity of condition and need for the assistance of 2 skilled therapists    Treatment Time:  12:55-13:23  Treatment Number:  1   Timed Code Treatment Minutes: 28 minutes  Total Treatment Minutes:  38  minutes    Patient Stated Goals for Therapy: wants to get out of bed and walk      Discharge Recommendations: SNF  DME needs for discharge: Defer to facility       Therapy recommendation for EMS Transport: requires transport by cot due to pt needs lift equipment for safe transfers    Therapy recommendations for staff:   Assist of 2 for sitting EOB    History of Present Illness:   69 y.o. male with a history of COPD on 4 L of O2 chronically, GERD, hyperlipidemia, alcohol liver cirrhosis admitted 2/8/25 for c/c shortness of breath.   Intubated 2/14-3/4/25     Preadmission Environment: from [facility] 10/24/24:  Pt lives with                                    with family (step son, DIL, and 3 grandkids)  Home environment:                                  mobile home/trailer  Steps to enter first floor:                3 steps to enter and hand rail bilateral  Steps to second floor/basement:   N/A  Laundry:                                         Pt completes  Bathroom:                                      tub/shower unit and comfort height toilet  Pt  Max A  Dynamic Sitting:  Total A   Comments: sat EOB x 2 minutes in heavily flexed posture. Pt repeatedly stated \"get your hands off\" but when provided with no assist, he lost balance every time - posteriorly or laterally.     Static Standing: Not Tested  Dynamic Standing:  Not Tested  Comments: unsafe to attempt    Posture  Seated:  maximally flexed through spine  Standing: Not Tested    Bed Mobility   Supine to Sit:    Max A  and 2 person  Sit to Supine:   Max A  and 2 person  Rolling:   Max A both directions  Scooting in sitting: Total A  Scooting in supine:  Total A   Bridging:  Not Tested  Comments:     Transfer Training     Sit to stand:   Not Tested  Stand to sit:   Not Tested  Bed to/from Chair:  Not Tested   Comments: unsafe to attempt    Gait gait deferred due to safety concern; pt ambulated 0 ft.     Stair Training deferred, pt unsafe/ not appropriate to complete stairs at this time    Therapeutic Exercises Initiated  all completed bilaterally unless indicated  Supine:  Heavy AAROM hips and knees all directions x 3-4 reps; pt making verbal comments and facial expressions of disapproval.    Seated:  N/A    Standing:  N/A    Positioning Needs   Pt in bed and ICU monitoring  Call light provided and all needs within reach  RN aware of pt position/status  1:1 sitter in room.    Other Activities  None.     Patient/Family Education   Pt educated on role of inpatient PT, POC, importance of continued activity, calling for assist with mobility.    Assessment  Pt seen today for physical therapy Re-Evaluation after lengthy period of intubation in ICU. Previously evaluated in early February, early in his hospitalization. Pt demonstrated gross strength impairments and decreased insight into his deficits. He was restless to get out of bed but will need time to restore strength and balance before attempting to stand. Expect it is premature to even try a STEDY transfer for a while. He required Min-Max A to sit at EOB and

## 2025-03-05 NOTE — PROGRESS NOTES
1203: New order for haldol from Dr. España after being at bedside.   On bedside monitor, QTc reading 540.  1215: ECG ordered.      1258: ECG completed, QTc reading 460 (bedside monitor reading 562).   PT/OT at bedside at this time. Will assess after therapy in haldol needed.

## 2025-03-05 NOTE — CARE COORDINATION
Patient is on 5L NC at this time.   [facility] transitions here to see the patient today.    Plan for Sathya Allred at [place]. [employer]. Precert will be needed.

## 2025-03-05 NOTE — PLAN OF CARE
Problem: Confusion  Goal: Confusion, delirium, dementia, or psychosis is improved or at baseline  Description: INTERVENTIONS:  1. Assess for possible contributors to thought disturbance, including medications, impaired vision or hearing, underlying metabolic abnormalities, dehydration, psychiatric diagnoses, and notify attending LIP  2. [place] high risk fall precautions, as indicated  3. Provide frequent short contacts to provide reality reorientation, refocusing and direction  4. Decrease environmental stimuli, including noise as appropriate  5. Monitor and intervene to maintain adequate nutrition, hydration, elimination, sleep and activity  6. If unable to ensure safety without constant attention obtain sitter and review sitter guidelines with assigned personnel  7. Initiate Psychosocial CNS and Spiritual Care consult, as indicated  3/4/2025 1543 by Carol Ann Murillo RN  Outcome: Not Progressing  Flowsheets (Taken 3/4/2025 1543)  Effect of thought disturbance (confusion, delirium, dementia, or psychosis) are managed with adequate functional status:   Assess for contributors to thought disturbance, including medications, impaired vision or hearing, underlying metabolic abnormalities, dehydration, psychiatric diagnoses, notify LIP   [place] high risk fall precautions, as indicated   Provide frequent short contacts to provide reality reorientation, refocusing and direction   Decrease environmental stimuli, including noise as appropriate  Note: Pt continues on precedex 1.4 mcg/kg/hr

## 2025-03-05 NOTE — PROGRESS NOTES
Speech Language Pathology  Swallowing Disorders and Dysphagia  Clinical Bedside Swallow Re-assessment  [place] [facility]    Instrumentation: Yes. MBSS is warranted to further assess oropharyngeal structures and function when pt able manage secretions and able to participate without agitation  Diet recommendation: NPO; 1-3 Ice chips at a time with SLP/RN only (oral care must be completed prior) for comfort and to aid in secretion management; Meds via alt means of nutrition  Risk management: upright for all intake, stay upright for at least 30 mins after intake, 1:1 supervision with intake, oral care q4 hrs to reduce adverse affects in the event of aspiration, increase physical mobility as able, slow rate of intake, general GERD precautions, and STRICT aspiration precautions    NAME:Sandor Early  : 1955 (69 y.o.)   MRN: [id number]  ROOM: 300/3008-01  ADMISSION DATE: 2025  Chief Complaint   Patient presents with    Shortness of Breath     Shortness of breath that started yesterday.  88% on 4lpm (baseline).  Non-rebreather placed.  10mg morphine given by squad for back pain.      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 [x] Yes   [] No  Baseline SPO2%: 96  Baseline RR: 15  Supplemental O2 Status: 5 LPM NC    Ice: no anterior bolus loss , suspect reduced/impaired A-P bolus transit, suspect premature bolus loss into pharynx, and coughing after the swallow delayed, after 3 or 3 swallows. Pt unable to cough to clear audible secretions.    No additional po trial assessed as pt unable to attend to cues or compensatory strategies and is unable to clear secretions adequately to make clinical judgements    IDDSI 0 (thin):   - 5cc bolus (tsp): DNT  - Cup: DNT  - Straw: DNT    IDDSI 2 (mildly thick): DNT    IDDSI 3 (moderately thick): DNT    IDDSI 4 (puree): DNT    IDDSI 5 (minced and moist): DNT    IDDSI 6 (soft and bite sized): DNT    IDDSI 7 (regular): DNT    3 oz water: DNT d/t cognitive deficits and inability to follow commands      RR following trials: 22    Impressions:    Patient presents as high aspiration risk and high PNA / adverse pulmonary event risk given the following factors via BOLUS Framework ([employer]KH[employer], [employer].H[employer] (2021):  poor overall health / frail  Impaired respiratory status / weak cough  hx GERD / GI disease affecting oropharyngeal swallowing  poor oral health  dependence for feeding / oral care  reduced ambulation     Clinical signs of oropharyngeal dysphagia likely acute related to reduced physical mobility, increased O2 demands, acute infection, disuse atrophy, generalized weakness, and intubation. Swallow prognosis is good. Instrumental swallow study is indicated d/t prolonged intubation but pt is not appropriate at this time d/t agitation,decreased participation, and difficulty managing secretions..  Given pt's acuity of illness, reduced physical mobility, poor oral care status, medical comorbidities, and overt clinical s/s of aspiration at bedside, pt is not safe for oral diet at this time from

## 2025-03-05 NOTE — PROGRESS NOTES
4 Eyes Skin Assessment     NAME:  Sandor Early  YOB: 1955  MEDICAL RECORD NUMBER:  [id number]    The patient is being assessed for  Shift Handoff    I agree that at least one RN has performed a thorough Head to Toe Skin Assessment on the patient. ALL assessment sites listed below have been assessed.      Areas assessed by both nurses:    Head, Face, Ears, Shoulders, Back, Chest, Arms, Elbows, Hands, Sacrum. Buttock, Coccyx, Ischium, Legs. Feet and Heels, and Under Medical Devices         Does the Patient have a Wound? Yes wound(s) were present on assessment. LDA wound assessment was Initiated and completed by RN       Abraham Prevention initiated by RN: No  Wound Care Orders initiated by RN: {YES/NO:[id number]}    Pressure Injury (Stage 3,4, Unstageable, DTI, NWPT, and Complex wounds) if present, place Wound referral order by RN under : Yes    New Ostomies, if present place, Ostomy referral order under : No     Nurse 1 eSignature: {Esignature:[id number]}    **SHARE this note so that the co-signing nurse can place an eSignature**    Nurse 2 eSignature: Electronically signed by Raquel Mccarthy RN on 3/5/25 at 5:44 AM EST

## 2025-03-05 NOTE — PLAN OF CARE
Problem: Safety - Adult  Goal: Free from fall injury  3/5/2025 1230 by Carol Ann Murillo RN  Outcome: Not Progressing  Flowsheets (Taken 3/5/2025 1230)  Free From Fall Injury:   Instruct family/caregiver on patient safety   Based on caregiver fall risk screen, instruct family/caregiver to ask for assistance with transferring infant if caregiver noted to have fall risk factors    Problem: Pain  Goal: Verbalizes/displays adequate comfort level or baseline comfort level  3/5/2025 1230 by Carol Ann Murillo RN  Outcome: Not Progressing  Flowsheets (Taken 3/5/2025 1230)  Verbalizes/displays adequate comfort level or baseline comfort level: Encourage patient to monitor pain and request assistance  Problem: Chronic Conditions and Co-morbidities  Goal: Patient's chronic conditions and co-morbidity symptoms are monitored and maintained or improved  3/5/2025 1230 by Carol Ann Murillo RN  Outcome: Progressing  Flowsheets (Taken 3/5/2025 1230)  Care Plan - Patient's Chronic Conditions and Co-Morbidity Symptoms are Monitored and Maintained or Improved:   Monitor and assess patient's chronic conditions and comorbid symptoms for stability, deterioration, or improvement   Collaborate with multidisciplinary team to address chronic and comorbid conditions and prevent exacerbation or deterioration     Problem: Respiratory - Adult  Goal: Achieves optimal ventilation and oxygenation  3/5/2025 1230 by Carol Ann Murillo RN  Outcome: Progressing  Flowsheets (Taken 3/5/2025 1230)  Achieves optimal ventilation and oxygenation:   Assess for changes in respiratory status   Assess for changes in mentation and behavior   Position to facilitate oxygenation and minimize respiratory effort     Problem: Skin/Tissue Integrity - Adult  Goal: Skin integrity remains intact  Description: 1.  Monitor for areas of redness and/or skin breakdown  2.  Assess vascular access sites hourly  3.  Every 4-6 hours minimum:  Change oxygen saturation probe site  4.

## 2025-03-05 NOTE — PLAN OF CARE
Patient provided a COPD Educational Folder that includes the following materials:     [x]  [employer] Booklet: Managing your COPD  [x]  ALA: Getting the Most Out of Medication Delivery Devices  [x]  ALA: My COPD Action Plan  [x]  Better Breathers Club: [facility]   [x]  Smoking Cessation Classes  [x]  Outpatient Spiritual Care Services  [x]  Magnet: Signs of COPD    PATIENT/CAREGIVER TEACHING:   Level of patient/caregiver understanding able to:   [x] Verbalize understanding   [] Demonstrate understanding       [] Teach back        [] Needs reinforcement     []  Other:     Electronically signed by Maile Diego RN on 3/5/2025 at 5:14 AM

## 2025-03-05 NOTE — PROGRESS NOTES
Care rounds completed with Dr. España and multidisciplinary team. Reviewed labs, meds, VS, assessment, & plan of care for today. See dictated note and new orders for details.     Stop precedex and see if pt clears  Try bipap 2 hours on and 2 hours off if pt will cooperate  DC lantus

## 2025-03-05 NOTE — PROGRESS NOTES
Shift assessment, completed, see flow sheet. Pt is alert and oriented x 1. Pt impulsive, restless, agitated at times. Will following simple commands on and off.     SR 97, /58 (73), SpO2 100%. Respirations are easy, even, and unlabored. Bilateral lung sounds rhonchi. 2 PIVs, WNL with precedex 0.8 mcg/kg/hr infusing.     External catheter in place, CLWS, WNL.      at bedside for pt safety.     Call light within reach. Bed in lowest position. Bed alarm on.

## 2025-03-05 NOTE — PROGRESS NOTES
Reassessment completed, see flowsheets. Heat rate 130-140. Pt more relaxed s/p haldol. Pt still restless but improved as well as being less vocal.     Gave PRN metoprolol per order, see mar.     All ICU Lines and monitoring remain in place. Bed locked in lowest position. Call light within reach.

## 2025-03-06 ENCOUNTER — APPOINTMENT (OUTPATIENT)
Dept: GENERAL RADIOLOGY | Age: 70
DRG: 207 | End: 2025-03-06
Payer: MEDICARE

## 2025-03-06 LAB
ALBUMIN SERPL-MCNC: 2.6 G/DL (ref 3.4–5)
ALBUMIN/GLOB SERPL: 0.4 {RATIO} (ref 1.1–2.2)
ALP SERPL-CCNC: 199 U/L (ref 40–129)
ALT SERPL-CCNC: 20 U/L (ref 10–40)
ANION GAP SERPL CALCULATED.3IONS-SCNC: 16 MMOL/L (ref 3–16)
AST SERPL-CCNC: 31 U/L (ref 15–37)
BASE EXCESS BLDV CALC-SCNC: 4.1 MMOL/L (ref -3–3)
BILIRUB SERPL-MCNC: 0.4 MG/DL (ref 0–1)
BUN SERPL-MCNC: 15 MG/DL (ref 7–20)
CALCIUM SERPL-MCNC: 8.9 MG/DL (ref 8.3–10.6)
CHLORIDE SERPL-SCNC: 103 MMOL/L (ref 99–110)
CO2 BLDV-SCNC: 29 MMOL/L
CO2 SERPL-SCNC: 25 MMOL/L (ref 21–32)
COHGB MFR BLDV: 1.3 % (ref 0–1.5)
CREAT SERPL-MCNC: 0.6 MG/DL (ref 0.8–1.3)
DEPRECATED RDW RBC AUTO: 17.1 % (ref 12.4–15.4)
GFR SERPLBLD CREATININE-BSD FMLA CKD-EPI: >90 ML/MIN/{1.73_M2}
GLUCOSE BLD-MCNC: 103 MG/DL (ref 70–99)
GLUCOSE BLD-MCNC: 103 MG/DL (ref 70–99)
GLUCOSE BLD-MCNC: 111 MG/DL (ref 70–99)
GLUCOSE BLD-MCNC: 88 MG/DL (ref 70–99)
GLUCOSE BLD-MCNC: 95 MG/DL (ref 70–99)
GLUCOSE BLD-MCNC: 97 MG/DL (ref 70–99)
GLUCOSE SERPL-MCNC: 88 MG/DL (ref 70–99)
HCO3 BLDV-SCNC: 28.1 MMOL/L (ref 23–29)
HCT VFR BLD AUTO: 29.8 % (ref 40.5–52.5)
HGB BLD-MCNC: 9.7 G/DL (ref 13.5–17.5)
MAGNESIUM SERPL-MCNC: 1.44 MG/DL (ref 1.8–2.4)
MCH RBC QN AUTO: 29.5 PG (ref 26–34)
MCHC RBC AUTO-ENTMCNC: 32.4 G/DL (ref 31–36)
MCV RBC AUTO: 90.8 FL (ref 80–100)
METHGB MFR BLDV: 0.3 %
O2 CT VFR BLDV CALC: 14 VOL %
O2 THERAPY: ABNORMAL
PCO2 BLDV: 40.3 MMHG (ref 40–50)
PERFORMED ON: ABNORMAL
PERFORMED ON: NORMAL
PH BLDV: 7.46 [PH] (ref 7.35–7.45)
PLATELET # BLD AUTO: 623 K/UL (ref 135–450)
PMV BLD AUTO: 7.3 FL (ref 5–10.5)
PO2 BLDV: 150.2 MMHG (ref 25–40)
POTASSIUM SERPL-SCNC: 3.5 MMOL/L (ref 3.5–5.1)
PROT SERPL-MCNC: 8.5 G/DL (ref 6.4–8.2)
RBC # BLD AUTO: 3.28 M/UL (ref 4.2–5.9)
SAO2 % BLDV: 99 %
SODIUM SERPL-SCNC: 144 MMOL/L (ref 136–145)
WBC # BLD AUTO: 8.1 K/UL (ref 4–11)

## 2025-03-06 PROCEDURE — 6370000000 HC RX 637 (ALT 250 FOR IP): Performed by: INTERNAL MEDICINE

## 2025-03-06 PROCEDURE — 6360000002 HC RX W HCPCS: Performed by: INTERNAL MEDICINE

## 2025-03-06 PROCEDURE — 83735 ASSAY OF MAGNESIUM: CPT

## 2025-03-06 PROCEDURE — 94660 CPAP INITIATION&MGMT: CPT

## 2025-03-06 PROCEDURE — 92526 ORAL FUNCTION THERAPY: CPT

## 2025-03-06 PROCEDURE — 94669 MECHANICAL CHEST WALL OSCILL: CPT

## 2025-03-06 PROCEDURE — 36415 COLL VENOUS BLD VENIPUNCTURE: CPT

## 2025-03-06 PROCEDURE — 94640 AIRWAY INHALATION TREATMENT: CPT

## 2025-03-06 PROCEDURE — 2500000003 HC RX 250 WO HCPCS: Performed by: INTERNAL MEDICINE

## 2025-03-06 PROCEDURE — 99291 CRITICAL CARE FIRST HOUR: CPT | Performed by: INTERNAL MEDICINE

## 2025-03-06 PROCEDURE — 82803 BLOOD GASES ANY COMBINATION: CPT

## 2025-03-06 PROCEDURE — 51701 INSERT BLADDER CATHETER: CPT

## 2025-03-06 PROCEDURE — 51798 US URINE CAPACITY MEASURE: CPT

## 2025-03-06 PROCEDURE — 99233 SBSQ HOSP IP/OBS HIGH 50: CPT | Performed by: INTERNAL MEDICINE

## 2025-03-06 PROCEDURE — 74018 RADEX ABDOMEN 1 VIEW: CPT

## 2025-03-06 PROCEDURE — 85027 COMPLETE CBC AUTOMATED: CPT

## 2025-03-06 PROCEDURE — 5A0955A ASSISTANCE WITH RESPIRATORY VENTILATION, GREATER THAN 96 CONSECUTIVE HOURS, HIGH NASAL FLOW/VELOCITY: ICD-10-PCS | Performed by: INTERNAL MEDICINE

## 2025-03-06 PROCEDURE — 2580000003 HC RX 258: Performed by: INTERNAL MEDICINE

## 2025-03-06 PROCEDURE — 94761 N-INVAS EAR/PLS OXIMETRY MLT: CPT

## 2025-03-06 PROCEDURE — 2500000003 HC RX 250 WO HCPCS: Performed by: STUDENT IN AN ORGANIZED HEALTH CARE EDUCATION/TRAINING PROGRAM

## 2025-03-06 PROCEDURE — 80053 COMPREHEN METABOLIC PANEL: CPT

## 2025-03-06 PROCEDURE — 2000000000 HC ICU R&B

## 2025-03-06 PROCEDURE — 6360000002 HC RX W HCPCS

## 2025-03-06 RX ORDER — METOPROLOL TARTRATE 1 MG/ML
5 INJECTION, SOLUTION INTRAVENOUS EVERY 6 HOURS
Status: DISCONTINUED | OUTPATIENT
Start: 2025-03-06 | End: 2025-03-06

## 2025-03-06 RX ADMIN — ENOXAPARIN SODIUM 40 MG: 100 INJECTION SUBCUTANEOUS at 09:08

## 2025-03-06 RX ADMIN — METOPROLOL TARTRATE 5 MG: 5 INJECTION INTRAVENOUS at 09:07

## 2025-03-06 RX ADMIN — SODIUM CHLORIDE, PRESERVATIVE FREE 10 ML: 5 INJECTION INTRAVENOUS at 09:08

## 2025-03-06 RX ADMIN — ALBUTEROL SULFATE 2.5 MG: 2.5 SOLUTION RESPIRATORY (INHALATION) at 11:04

## 2025-03-06 RX ADMIN — LEVETIRACETAM 750 MG: 100 INJECTION INTRAVENOUS at 23:04

## 2025-03-06 RX ADMIN — ALBUTEROL SULFATE 2.5 MG: 2.5 SOLUTION RESPIRATORY (INHALATION) at 08:00

## 2025-03-06 RX ADMIN — DILTIAZEM HYDROCHLORIDE 60 MG: 60 TABLET ORAL at 20:36

## 2025-03-06 RX ADMIN — ALBUTEROL SULFATE 2.5 MG: 2.5 SOLUTION RESPIRATORY (INHALATION) at 15:10

## 2025-03-06 RX ADMIN — MAGNESIUM SULFATE HEPTAHYDRATE 2000 MG: 40 INJECTION, SOLUTION INTRAVENOUS at 09:07

## 2025-03-06 RX ADMIN — PANTOPRAZOLE SODIUM 40 MG: 40 INJECTION, POWDER, LYOPHILIZED, FOR SOLUTION INTRAVENOUS at 09:07

## 2025-03-06 RX ADMIN — METOPROLOL TARTRATE 25 MG: 25 TABLET, FILM COATED ORAL at 20:36

## 2025-03-06 RX ADMIN — METOPROLOL TARTRATE 5 MG: 5 INJECTION INTRAVENOUS at 14:20

## 2025-03-06 RX ADMIN — METOPROLOL TARTRATE 5 MG: 5 INJECTION INTRAVENOUS at 23:21

## 2025-03-06 RX ADMIN — SODIUM CHLORIDE, PRESERVATIVE FREE 10 ML: 5 INJECTION INTRAVENOUS at 20:38

## 2025-03-06 RX ADMIN — SODIUM CHLORIDE, PRESERVATIVE FREE 10 ML: 5 INJECTION INTRAVENOUS at 20:37

## 2025-03-06 RX ADMIN — THIAMINE HYDROCHLORIDE 200 MG: 100 INJECTION, SOLUTION INTRAMUSCULAR; INTRAVENOUS at 11:22

## 2025-03-06 RX ADMIN — GABAPENTIN 200 MG: 250 SOLUTION ORAL at 20:36

## 2025-03-06 RX ADMIN — ALBUTEROL SULFATE 2.5 MG: 2.5 SOLUTION RESPIRATORY (INHALATION) at 19:38

## 2025-03-06 RX ADMIN — METOCLOPRAMIDE 10 MG: 5 INJECTION, SOLUTION INTRAMUSCULAR; INTRAVENOUS at 14:20

## 2025-03-06 RX ADMIN — LEVETIRACETAM 750 MG: 100 INJECTION INTRAVENOUS at 09:08

## 2025-03-06 RX ADMIN — METOCLOPRAMIDE 10 MG: 5 INJECTION, SOLUTION INTRAMUSCULAR; INTRAVENOUS at 03:10

## 2025-03-06 RX ADMIN — METOCLOPRAMIDE 10 MG: 5 INJECTION, SOLUTION INTRAMUSCULAR; INTRAVENOUS at 20:36

## 2025-03-06 RX ADMIN — METOPROLOL TARTRATE 5 MG: 5 INJECTION INTRAVENOUS at 03:13

## 2025-03-06 RX ADMIN — DILTIAZEM HYDROCHLORIDE 60 MG: 60 TABLET ORAL at 14:19

## 2025-03-06 ASSESSMENT — PAIN SCALES - GENERAL: PAINLEVEL_OUTOF10: 0

## 2025-03-06 NOTE — PROGRESS NOTES
RT Inhaler-Nebulizer Bronchodilator Protocol Note    There is a bronchodilator order in the chart from a provider indicating to follow the RT Bronchodilator Protocol and there is an “Initiate RT Inhaler-Nebulizer Bronchodilator Protocol” order as well (see protocol at bottom of note).    CXR Findings:  XR CHEST PORTABLE    Result Date: 3/4/2025  Slightly improved perihilar opacities centrally.       The findings from the last RT Protocol Assessment were as follows:   History Pulmonary Disease: Chronic pulmonary disease  Respiratory Pattern: Dyspnea on exertion or RR 21-25 bpm  Breath Sounds: Slightly diminished and/or crackles  Cough: Strong, productive  Indication for Bronchodilator Therapy: Decreased or absent breath sounds  Bronchodilator Assessment Score: 7    Aerosolized bronchodilator medication orders have been revised according to the RT Inhaler-Nebulizer Bronchodilator Protocol below.    Respiratory Therapist to perform RT Therapy Protocol Assessment initially then follow the protocol.  Repeat RT Therapy Protocol Assessment PRN for score 0-3 or on second treatment, BID, and PRN for scores above 3.    No Indications - adjust the frequency to every 6 hours PRN wheezing or bronchospasm, if no treatments needed after 48 hours then discontinue using Per Protocol order mode.     If indication present, adjust the RT bronchodilator orders based on the Bronchodilator Assessment Score as indicated below.  Use Inhaler orders unless patient has one or more of the following: on home nebulizer, not able to hold breath for 10 seconds, is not alert and oriented, cannot activate and use MDI correctly, or respiratory rate 25 breaths per minute or more, then use the equivalent nebulizer order(s) with same Frequency and PRN reasons based on the score.  If a patient is on this medication at home then do not decrease Frequency below that used at home.    0-3 - enter or revise RT bronchodilator order(s) to equivalent RT  Bronchodilator order with Frequency of every 4 hours PRN for wheezing or increased work of breathing using Per Protocol order mode.        4-6 - enter or revise RT Bronchodilator order(s) to two equivalent RT bronchodilator orders with one order with BID Frequency and one order with Frequency of every 4 hours PRN wheezing or increased work of breathing using Per Protocol order mode.        7-10 - enter or revise RT Bronchodilator order(s) to two equivalent RT bronchodilator orders with one order with TID Frequency and one order with Frequency of every 4 hours PRN wheezing or increased work of breathing using Per Protocol order mode.       11-13 - enter or revise RT Bronchodilator order(s) to one equivalent RT bronchodilator order with QID Frequency and an Albuterol order with Frequency of every 4 hours PRN wheezing or increased work of breathing using Per Protocol order mode.      Greater than 13 - enter or revise RT Bronchodilator order(s) to one equivalent RT bronchodilator order with every 4 hours Frequency and an Albuterol order with Frequency of every 2 hours PRN wheezing or increased work of breathing using Per Protocol order mode.     Electronically signed by Priscilla Grady RCP on 3/5/2025 at 8:12 PM

## 2025-03-06 NOTE — PROGRESS NOTES
RT Inhaler-Nebulizer Bronchodilator Protocol Note    There is a bronchodilator order in the chart from a provider indicating to follow the RT Bronchodilator Protocol and there is an “Initiate RT Inhaler-Nebulizer Bronchodilator Protocol” order as well (see protocol at bottom of note).    CXR Findings:  No results found.    The findings from the last RT Protocol Assessment were as follows:   History Pulmonary Disease: (P) Chronic pulmonary disease  Respiratory Pattern: (P) Dyspnea on exertion or RR 21-25 bpm  Breath Sounds: (P) Inspiratory and expiratory or bilateral wheezing and/or rhonchi  Cough: (P) Weak, productive  Indication for Bronchodilator Therapy: (P) Wheezing associated with pulm disorder  Bronchodilator Assessment Score: (P) 12    Aerosolized bronchodilator medication orders have been revised according to the RT Inhaler-Nebulizer Bronchodilator Protocol below.    Respiratory Therapist to perform RT Therapy Protocol Assessment initially then follow the protocol.  Repeat RT Therapy Protocol Assessment PRN for score 0-3 or on second treatment, BID, and PRN for scores above 3.    No Indications - adjust the frequency to every 6 hours PRN wheezing or bronchospasm, if no treatments needed after 48 hours then discontinue using Per Protocol order mode.     If indication present, adjust the RT bronchodilator orders based on the Bronchodilator Assessment Score as indicated below.  Use Inhaler orders unless patient has one or more of the following: on home nebulizer, not able to hold breath for 10 seconds, is not alert and oriented, cannot activate and use MDI correctly, or respiratory rate 25 breaths per minute or more, then use the equivalent nebulizer order(s) with same Frequency and PRN reasons based on the score.  If a patient is on this medication at home then do not decrease Frequency below that used at home.    0-3 - enter or revise RT bronchodilator order(s) to equivalent RT Bronchodilator order with

## 2025-03-06 NOTE — PROGRESS NOTES
03/06/25 0349   NIV Type   NIV Started/Stopped On   Equipment Type v60   Mode Bilevel   Mask Type Full face mask   Mask Size Extra large   Assessment   Pulse (!) 115   Respirations 20   SpO2 99 %   Comfort Level Good   Using Accessory Muscles No   Mask Compliance Good   Skin Assessment Clean, dry, & intact   Skin Protection for O2 Device Yes   Orientation Middle   Location Nose   Intervention(s) Skin Barrier   Breath Sounds   Respiratory Pattern Regular   Right Upper Lobe Rhonchi   Right Middle Lobe Rhonchi   Right Lower Lobe Diminished   Left Upper Lobe Rhonchi   Left Lower Lobe Diminished   Settings/Measurements   PIP Observed 17 cm H20   IPAP 15 cmH20   CPAP/EPAP 5 cmH2O   Vt (Measured) 530 mL   Rate Ordered 18   Insp Rise Time (%) 1 %   FiO2  40 %   I Time/ I Time % 0.9 s   Minute Volume (L/min) 15.1 Liters   Mask Leak (lpm) 18 lpm   Patient's Home Machine No   Alarm Settings   Alarms On Y   Low Pressure (cmH2O) 5 cmH2O   High Pressure (cmH2O) 40 cmH2O   Apnea (secs) 20 secs   RR Low (bpm) 12   RR High (bpm) 40 br/min

## 2025-03-06 NOTE — PROGRESS NOTES
Speech Language Pathology  Swallowing Disorders and Dysphagia    Dysphagia Treatment/Follow-Up Note  Facility/Department[place] [facility] [place] [facility]    Instrumentation: Yes. MBSS is warranted to further assess oropharyngeal structures and function when pt able manage secretions and able to participate without agitation  Diet recommendation: NPO; 1-3 Ice chips at a time with SLP/RN only (oral care must be completed prior) for comfort and to aid in secretion management; Meds via alt means of nutrition  Risk management: upright for all intake, stay upright for at least 30 mins after intake, 1:1 supervision with intake, oral care q4 hrs to reduce adverse affects in the event of aspiration, increase physical mobility as able, slow rate of intake, general GERD precautions, and STRICT aspiration precautions    NAME:Sandor Early  : 1955 (69 y.o.)   MRN: [id number]  ROOM: 3008/3008-01  ADMISSION DATE: 2025  PATIENT DIAGNOSIS(ES): Hypomagnesemia [E83.42]  Influenza A [J10.1]  COPD exacerbation (HCC) [J44.1]  Respiratory failure with hypoxia (HCC) [J96.91]  Alcohol use disorder [F10.90]  Acute on chronic respiratory failure with hypoxia and hypercapnia (HCC) [J96.21, J96.22]  Acute respiratory failure (HCC) [J96.00]  Allergies   Allergen Reactions    Lisinopril Swelling and Other (See Comments)    Ace Inhibitors Swelling and Other (See Comments)    Influenza Vaccines Other (See Comments)     He states he was hospitalized when he received his first flu vaccine    Tiotropium Bromide Monohydrate Swelling and Other (See Comments)     Tolerates both tudorza and incruse  On Trelegy.    Vilanterol        DATE ONSET: 2025    Pain: The patient does not complain of pain       Current Diet: Diet NPO      Dysphagia Treatment and Impressions:  Subjective: Pt seen in room at bedside with RN (Carol Ann) permission  Noteworthy events reported/Chart Review: No acute changes since last seen. RN reports improved ERNA and ability to follow

## 2025-03-06 NOTE — PROGRESS NOTES
Pulmonary & Critical Care Medicine ICU Progress Note    CC: COPD respiratory failure    Events of Last 24 hours: used bipap overnight  Precedex off  Pt confused and restless  Vascular lines: IV: Left IJ -. R Midline     MV: -3/4/25  Vent Mode: AC/VC Resp Rate (Set): 0 bpm/Vt (Set, mL): 0 mL/ /FiO2 : 30 %  Recent Labs     25  1200 25  0459   PHART 7.439 7.375   DYP2ERK 50.8* 50.0*   PO2ART 64.2* 72.6*       IV:   sodium chloride      sodium chloride      sodium chloride      dextrose         Vitals:  Blood pressure (!) 149/107, pulse (!) 129, temperature 99.9 °F (37.7 °C), temperature source Axillary, resp. rate 28, height 1.702 m (5' 7.01\"), weight 84.3 kg (185 lb 13.6 oz), SpO2 97%.  on   Temp  Av.9 °F (36.6 °C)  Min: 97 °F (36.1 °C)  Max: 99.9 °F (37.7 °C)    Intake/Output Summary (Last 24 hours) at 3/6/2025 0823  Last data filed at 3/6/2025 0230  Gross per 24 hour   Intake 137.66 ml   Output 2805 ml   Net -2667.34 ml     EXAM:  General: ill appearing.    Eyes: No sclera icterus. No conjunctival injection.  ENT: No discharge.   Neck: Trachea midline.   Resp: No accessory muscle use. Few crackles. No wheezing. + rhonchi.   CV: Regular  rate. Regular rhythm. No mumur or rub. + LE edema.   GI: Non-tender. Non-distended.   Skin: Warm and dry. No rash on exposed extremities.  M/S: No cyanosis. No clubbing.   Neuro: intermittently + following commands.  Good cough.   Psych: mild agiation. Alert to name        Scheduled Meds:   levETIRAcetam  750 mg IntraVENous Q12H    thiamine (B-1) 200 mg in sodium chloride 0.9 % 100 mL IVPB  200 mg IntraVENous Q24H    metoprolol tartrate  25 mg Oral BID    QUEtiapine  50 mg Oral BID    polyethylene glycol  17 g Oral Daily    Gabapentin  200 mg Per NG tube BID    sodium chloride flush  5-40 mL IntraVENous 2 times per day    lidocaine 1 % injection  50 mg IntraDERmal Once    metoclopramide  10 mg IntraVENous Q6H    insulin lispro  0-16 Units

## 2025-03-06 NOTE — PLAN OF CARE
Problem: Chronic Conditions and Co-morbidities  Goal: Patient's chronic conditions and co-morbidity symptoms are monitored and maintained or improved  3/5/2025 2059 by Maile Diego RN  Outcome: Progressing  3/5/2025 1230 by Carol Ann Murillo RN  Outcome: Progressing  Flowsheets (Taken 3/5/2025 1230)  Care Plan - Patient's Chronic Conditions and Co-Morbidity Symptoms are Monitored and Maintained or Improved:   Monitor and assess patient's chronic conditions and comorbid symptoms for stability, deterioration, or improvement   Collaborate with multidisciplinary team to address chronic and comorbid conditions and prevent exacerbation or deterioration     Problem: Discharge Planning  Goal: Discharge to home or other facility with appropriate resources  Outcome: Progressing     Problem: Safety - Adult  Goal: Free from fall injury  3/5/2025 2059 by Maile Diego RN  Outcome: Progressing  3/5/2025 1230 by Carol Ann Murillo RN  Outcome: Not Progressing  Flowsheets (Taken 3/5/2025 1230)  Free From Fall Injury:   Instruct family/caregiver on patient safety   Based on caregiver fall risk screen, instruct family/caregiver to ask for assistance with transferring infant if caregiver noted to have fall risk factors     Problem: Pain  Goal: Verbalizes/displays adequate comfort level or baseline comfort level  3/5/2025 2059 by Maile Diego RN  Outcome: Progressing  3/5/2025 1230 by Carol Ann Murillo RN  Outcome: Not Progressing  Flowsheets (Taken 3/5/2025 1230)  Verbalizes/displays adequate comfort level or baseline comfort level: Encourage patient to monitor pain and request assistance     Problem: Respiratory - Adult  Goal: Achieves optimal ventilation and oxygenation  3/5/2025 1230 by Carol Ann Murillo RN  Outcome: Progressing  Flowsheets (Taken 3/5/2025 1230)  Achieves optimal ventilation and oxygenation:   Assess for changes in respiratory status   Assess for changes in mentation and behavior   Position to facilitate  limits  Outcome: Progressing     Problem: Skin/Tissue Integrity  Goal: Skin integrity remains intact  Description: 1.  Monitor for areas of redness and/or skin breakdown  2.  Assess vascular access sites hourly  3.  Every 4-6 hours minimum:  Change oxygen saturation probe site  4.  Every 4-6 hours:  If on nasal continuous positive airway pressure, respiratory therapy assess nares and determine need for appliance change or resting period  3/5/2025 2059 by Maile Diego RN  Outcome: Progressing  3/5/2025 1230 by Carol Ann Murillo, RN  Outcome: Progressing  Flowsheets (Taken 3/5/2025 1230)  Skin Integrity Remains Intact:   Monitor for areas of redness and/or skin breakdown   Assess vascular access sites hourly   Every 4-6 hours minimum: Change oxygen saturation probe site   Every 4-6 hours: If on nasal continuous positive airway pressure, respiratory therapy assesses nares and determine need for appliance change or resting period     Problem: Nutrition Deficit:  Goal: Optimize nutritional status  Outcome: Progressing     Problem: Confusion  Goal: Confusion, delirium, dementia, or psychosis is improved or at baseline  Description: INTERVENTIONS:  1. Assess for possible contributors to thought disturbance, including medications, impaired vision or hearing, underlying metabolic abnormalities, dehydration, psychiatric diagnoses, and notify attending LIP  2. [place] high risk fall precautions, as indicated  3. Provide frequent short contacts to provide reality reorientation, refocusing and direction  4. Decrease environmental stimuli, including noise as appropriate  5. Monitor and intervene to maintain adequate nutrition, hydration, elimination, sleep and activity  6. If unable to ensure safety without constant attention obtain sitter and review sitter guidelines with assigned personnel  7. Initiate Psychosocial CNS and Spiritual Care consult, as indicated  3/5/2025 1230 by Carol Ann Murillo, RN  Outcome: Not

## 2025-03-06 NOTE — PROGRESS NOTES
Care rounds completed with Dr. España and multidisciplinary team. Reviewed labs, meds, VS, assessment, & plan of care for today. See dictated note and new orders for details.     Place NGT   Restart PO meds and TF  Use metoprolol IV PRN once able to take PO

## 2025-03-06 NOTE — PROGRESS NOTES
03/05/25 2308   NIV Type   NIV Started/Stopped On   Equipment Type v60   Mode Bilevel   Mask Type Full face mask   Mask Size Extra large   Assessment   Pulse (!) 122   Respirations 26   SpO2 99 %   Comfort Level Good   Using Accessory Muscles No   Mask Compliance Good   Skin Assessment Clean, dry, & intact   Skin Protection for O2 Device Yes   Orientation Middle   Location Nose   Intervention(s) Skin Barrier   Breath Sounds   Respiratory Pattern Regular   Breath Sounds Bilateral Rhonchi   Right Upper Lobe Rhonchi   Right Middle Lobe Rhonchi   Right Lower Lobe Rhonchi   Left Upper Lobe Rhonchi   Left Lower Lobe Rhonchi   Settings/Measurements   PIP Observed 16 cm H20   IPAP 15 cmH20   CPAP/EPAP 5 cmH2O   Vt (Measured) 678 mL   Rate Ordered 18   Insp Rise Time (%) 1 %   FiO2  40 %   I Time/ I Time % 0.9 s   Minute Volume (L/min) 19.6 Liters   Mask Leak (lpm) 40 lpm   Patient's Home Machine No   Alarm Settings   Alarms On Y   Low Pressure (cmH2O) 5 cmH2O   High Pressure (cmH2O) 40 cmH2O   Apnea (secs) 20 secs   RR Low (bpm) 12   RR High (bpm) 40 br/min

## 2025-03-06 NOTE — PROGRESS NOTES
Progress Note    Admit Date:  1/30/2025    Interval history    Tolerated pressure support 5/5 for 6 hours yesterday.  Plans for spontaneous breathing trial and possible extubation today.  On Precedex.    3/5-extubated on 3/4/2025.  Placed on BiPAP.  Has been intermittently confused.  Was on Precedex.  Has had some intermittent agitation.  When I saw the patient his eyes are open.   at bedside.  He has a good cough.    3/6- more calm then yesterday. On Bipap. Failed speech eval but they will see him again. Responding nicely to verbal commands,      Objective:   Patient Vitals for the past 4 hrs:   BP Temp Temp src Pulse Resp SpO2   03/06/25 0930 -- 97.5 °F (36.4 °C) Oral -- -- --   03/06/25 0800 (!) 149/107 99.9 °F (37.7 °C) Axillary (!) 129 28 97 %   03/06/25 0702 (!) 163/102 -- -- (!) 128 30 96 %   03/06/25 0622 -- -- -- (!) 125 23 97 %          Intake/Output Summary (Last 24 hours) at 3/6/2025 1001  Last data filed at 3/6/2025 0230  Gross per 24 hour   Intake 137.66 ml   Output 2805 ml   Net -2667.34 ml       Physical Exam:  General: Awake and alert. Responds to commands. Ill appearing.   Mucous Membranes:  Pink , anicteric  Neck: No JVD, no carotid bruit, no thyromegaly  Chest: No accessory muscle use.  Minimal wheezing.  Few rhonchi.  Cardiovascular:  RRR S1S2 heard, no murmurs or gallops  Abdomen:  Soft, obese +distended, non tender, no organomegaly, BS absent  Extremities: trace edema to ext improved   Distal pulses well felt  Neurological awake and alert. Moves all 4 extremities.    cations:  metoprolol, 5 mg, Q6H  levETIRAcetam, 750 mg, Q12H  thiamine (B-1) 200 mg in sodium chloride 0.9 % 100 mL IVPB, 200 mg, Q24H  [Held by provider] metoprolol tartrate, 25 mg, BID  polyethylene glycol, 17 g, Daily  Gabapentin, 200 mg, BID  sodium chloride flush, 5-40 mL, 2 times per day  lidocaine 1 % injection, 50 mg, Once  metoclopramide, 10 mg, Q6H  insulin lispro, 0-16 Units, Q4H  dilTIAZem, 60 mg, 3  times per day  albuterol, 2.5 mg, 4x Daily RT  sodium chloride flush, 5-40 mL, 2 times per day  pantoprazole (PROTONIX) 40 mg in sodium chloride (PF) 0.9 % 10 mL injection, 40 mg, Daily  enoxaparin, 40 mg, Daily  allopurinol, 300 mg, Daily  vitamin D3, 5,000 Units, Daily  DULoxetine, 60 mg, Daily  atorvastatin, 40 mg, Daily      PRN Medications:  haloperidol lactate, 5 mg, Q6H PRN  metoprolol, 5 mg, Q6H PRN  albuterol, 2.5 mg, Q4H PRN  carboxymethylcellulose PF, 1 drop, PRN  sodium chloride flush, 5-40 mL, PRN  sodium chloride, , PRN  sodium chloride flush, 5-40 mL, PRN  diatrizoate meglumine-sodium, 12 mL, ONCE PRN  [Held by provider] guaiFENesin, 200 mg, Q4H PRN  benzocaine, , 4x Daily PRN  phenol, 1 spray, Q2H PRN  sodium chloride, , PRN  ondansetron, 4 mg, Q8H PRN   Or  ondansetron, 4 mg, Q6H PRN  [Held by provider] acetaminophen, 650 mg, Q6H PRN   Or  [Held by provider] acetaminophen, 650 mg, Q6H PRN  dextrose bolus, 125 mL, PRN   Or  dextrose bolus, 250 mL, PRN  glucagon (rDNA), 1 mg, PRN  dextrose, , Continuous PRN  potassium chloride, 10 mEq, PRN  magnesium sulfate, 2,000 mg, PRN  [Held by provider] oxyCODONE HCl, 10 mg, Q8H PRN  glucose, 4 tablet, PRN          Data:  CBC:   Recent Labs     03/04/25 0457 03/05/25  0440 03/06/25  0514   WBC 8.6 7.8 8.1   HGB 8.5* 9.1* 9.7*   HCT 26.5* 27.8* 29.8*   MCV 92.1 91.4 90.8    489* 623*     BMP:   Recent Labs     03/04/25 0457 03/05/25  0440 03/06/25  0514    136 144   K 4.0 3.6 3.5    100 103   CO2 20* 30 25   BUN 21* 14 15   CREATININE 0.7* 0.6* 0.6*     LIVER PROFILE:   Recent Labs     03/04/25  0457 03/05/25  0440 03/06/25  0514   AST 31 31  27 31   ALT 19 16  17 20   BILIDIR  --  <0.1  --    BILITOT 0.3 0.3  0.4 0.4   ALKPHOS 203* 186*  184* 199*     PT/INR: No results for input(s): \"PROTIME\", \"INR\" in the last 72 hours.    CULTURES  Results       Procedure Component Value Units Date/Time    Culture, Respiratory (with Gram Stain)

## 2025-03-06 NOTE — PROGRESS NOTES
Pt hasn't oriented more than 100 ml for a few hours. Bladder scan completed 562 ml. Straight cath completed, 400 ml out.

## 2025-03-06 NOTE — PROGRESS NOTES
Reassessment completed, see flowsheets, unchanged from prior. Pt continues to have rhonchi, with moderate amount of oral secretions, requiring nursing staff to suction.   Currently on 4L/HFNC.   L nare NGT clamped with bridle.      in place.     All ICU Lines and monitoring remain in place. Bed locked in lowest position. Call light within reach.

## 2025-03-06 NOTE — PROGRESS NOTES
4 Eyes Skin Assessment     NAME:  Sandor Early  YOB: 1955  MEDICAL RECORD NUMBER:  [id number]    The patient is being assessed for  Shift Handoff    I agree that at least one RN has performed a thorough Head to Toe Skin Assessment on the patient. ALL assessment sites listed below have been assessed.      Areas assessed by both nurses:    Head, Face, Ears, Shoulders, Back, Chest, Arms, Elbows, Hands, Sacrum. Buttock, Coccyx, Ischium, Legs. Feet and Heels, and Under Medical Devices   Pt scratched bottom       Does the Patient have a Wound? Yes wound(s) were present on assessment. LDA wound assessment was Initiated and completed by RN       [place] Prevention initiated by RN: Yes  Wound Care Orders initiated by RN: No    Pressure Injury (Stage 3,4, Unstageable, DTI, NWPT, and Complex wounds) if present, place Wound referral order by RN under : No    New Ostomies, if present place, Ostomy referral order under : Yes     Nurse 1 eSignature: Electronically signed by Gia Panchal RN on 3/6/25 at 7:40 AM EST    **SHARE this note so that the co-signing nurse can place an eSignature**    Nurse 2 eSignature: Electronically signed by Maile Diego RN on 3/6/25 at 7:41 AM EST

## 2025-03-06 NOTE — PROGRESS NOTES
Reassessment completed, see flowsheets, unchanged from prior. Pt continues to have rhonchi, with moderate amount of oral secretions, requiring nursing staff to suction.   Currently on 4L/HFNC.     L nare NGT clamped with bridle. Awaiting TF.     No urine output this shift. Bladder scan 422 ml. Straight cath with 300 ml out. S/P straight cath showed 190 ml.    Telesitter in place. All ICU Lines and monitoring remain in place. Bed locked in lowest position. Call light within reach.

## 2025-03-06 NOTE — PROGRESS NOTES
Shift assessment, completed, see flow sheet. Pt is alert and oriented x 1-2. Pt speech soft, garbled. Pt will following simple commands on and off. Pt restless at times.      , /107 (115), SpO2 97%. Respirations are easy, even, and unlabored. Bilateral lung sounds rhonchi.     2 PIVs, WNL, magnesium replacement infusing.     External catheter in place, CLWS, WNL.       at bedside for pt safety.      Call light within reach. Bed in lowest position. Bed alarm on.

## 2025-03-07 LAB
ALBUMIN SERPL-MCNC: 2.8 G/DL (ref 3.4–5)
ALBUMIN SERPL-MCNC: 2.8 G/DL (ref 3.4–5)
ALBUMIN/GLOB SERPL: 0.5 {RATIO} (ref 1.1–2.2)
ALP SERPL-CCNC: 190 U/L (ref 40–129)
ALP SERPL-CCNC: 208 U/L (ref 40–129)
ALT SERPL-CCNC: 18 U/L (ref 10–40)
ALT SERPL-CCNC: 20 U/L (ref 10–40)
ANION GAP SERPL CALCULATED.3IONS-SCNC: 12 MMOL/L (ref 3–16)
AST SERPL-CCNC: 23 U/L (ref 15–37)
AST SERPL-CCNC: 38 U/L (ref 15–37)
BILIRUB DIRECT SERPL-MCNC: 0.2 MG/DL (ref 0–0.3)
BILIRUB INDIRECT SERPL-MCNC: 0.2 MG/DL (ref 0–1)
BILIRUB SERPL-MCNC: 0.3 MG/DL (ref 0–1)
BILIRUB SERPL-MCNC: 0.4 MG/DL (ref 0–1)
BUN SERPL-MCNC: 20 MG/DL (ref 7–20)
CALCIUM SERPL-MCNC: 8.9 MG/DL (ref 8.3–10.6)
CHLORIDE SERPL-SCNC: 111 MMOL/L (ref 99–110)
CO2 SERPL-SCNC: 30 MMOL/L (ref 21–32)
CREAT SERPL-MCNC: 0.7 MG/DL (ref 0.8–1.3)
DEPRECATED RDW RBC AUTO: 17 % (ref 12.4–15.4)
EKG ATRIAL RATE: 129 BPM
EKG DIAGNOSIS: NORMAL
EKG P AXIS: 72 DEGREES
EKG P-R INTERVAL: 184 MS
EKG Q-T INTERVAL: 326 MS
EKG QRS DURATION: 72 MS
EKG QTC CALCULATION (BAZETT): 477 MS
EKG R AXIS: -65 DEGREES
EKG T AXIS: 81 DEGREES
EKG VENTRICULAR RATE: 129 BPM
GFR SERPLBLD CREATININE-BSD FMLA CKD-EPI: >90 ML/MIN/{1.73_M2}
GLUCOSE BLD-MCNC: 117 MG/DL (ref 70–99)
GLUCOSE BLD-MCNC: 126 MG/DL (ref 70–99)
GLUCOSE BLD-MCNC: 127 MG/DL (ref 70–99)
GLUCOSE BLD-MCNC: 137 MG/DL (ref 70–99)
GLUCOSE BLD-MCNC: 141 MG/DL (ref 70–99)
GLUCOSE SERPL-MCNC: 118 MG/DL (ref 70–99)
HCT VFR BLD AUTO: 30.2 % (ref 40.5–52.5)
HGB BLD-MCNC: 9.7 G/DL (ref 13.5–17.5)
MCH RBC QN AUTO: 29.1 PG (ref 26–34)
MCHC RBC AUTO-ENTMCNC: 32.1 G/DL (ref 31–36)
MCV RBC AUTO: 90.5 FL (ref 80–100)
PERFORMED ON: ABNORMAL
PLATELET # BLD AUTO: 626 K/UL (ref 135–450)
PMV BLD AUTO: 7.4 FL (ref 5–10.5)
POTASSIUM SERPL-SCNC: 4 MMOL/L (ref 3.5–5.1)
PROT SERPL-MCNC: 8.7 G/DL (ref 6.4–8.2)
PROT SERPL-MCNC: 8.9 G/DL (ref 6.4–8.2)
RBC # BLD AUTO: 3.34 M/UL (ref 4.2–5.9)
SODIUM SERPL-SCNC: 147 MMOL/L (ref 136–145)
SODIUM SERPL-SCNC: 153 MMOL/L (ref 136–145)
WBC # BLD AUTO: 8.9 K/UL (ref 4–11)

## 2025-03-07 PROCEDURE — 51798 US URINE CAPACITY MEASURE: CPT

## 2025-03-07 PROCEDURE — 6370000000 HC RX 637 (ALT 250 FOR IP): Performed by: INTERNAL MEDICINE

## 2025-03-07 PROCEDURE — 94660 CPAP INITIATION&MGMT: CPT

## 2025-03-07 PROCEDURE — 6360000002 HC RX W HCPCS

## 2025-03-07 PROCEDURE — 6370000000 HC RX 637 (ALT 250 FOR IP): Performed by: STUDENT IN AN ORGANIZED HEALTH CARE EDUCATION/TRAINING PROGRAM

## 2025-03-07 PROCEDURE — 2000000000 HC ICU R&B

## 2025-03-07 PROCEDURE — 36415 COLL VENOUS BLD VENIPUNCTURE: CPT

## 2025-03-07 PROCEDURE — 2500000003 HC RX 250 WO HCPCS: Performed by: INTERNAL MEDICINE

## 2025-03-07 PROCEDURE — 2580000003 HC RX 258: Performed by: INTERNAL MEDICINE

## 2025-03-07 PROCEDURE — 94640 AIRWAY INHALATION TREATMENT: CPT

## 2025-03-07 PROCEDURE — 85027 COMPLETE CBC AUTOMATED: CPT

## 2025-03-07 PROCEDURE — 6360000002 HC RX W HCPCS: Performed by: INTERNAL MEDICINE

## 2025-03-07 PROCEDURE — 2700000000 HC OXYGEN THERAPY PER DAY

## 2025-03-07 PROCEDURE — 2500000003 HC RX 250 WO HCPCS: Performed by: STUDENT IN AN ORGANIZED HEALTH CARE EDUCATION/TRAINING PROGRAM

## 2025-03-07 PROCEDURE — 84295 ASSAY OF SERUM SODIUM: CPT

## 2025-03-07 PROCEDURE — 94669 MECHANICAL CHEST WALL OSCILL: CPT

## 2025-03-07 PROCEDURE — 99291 CRITICAL CARE FIRST HOUR: CPT | Performed by: INTERNAL MEDICINE

## 2025-03-07 PROCEDURE — 80053 COMPREHEN METABOLIC PANEL: CPT

## 2025-03-07 PROCEDURE — 31720 CLEARANCE OF AIRWAYS: CPT

## 2025-03-07 PROCEDURE — 51701 INSERT BLADDER CATHETER: CPT

## 2025-03-07 PROCEDURE — 93010 ELECTROCARDIOGRAM REPORT: CPT | Performed by: INTERNAL MEDICINE

## 2025-03-07 PROCEDURE — 99232 SBSQ HOSP IP/OBS MODERATE 35: CPT | Performed by: STUDENT IN AN ORGANIZED HEALTH CARE EDUCATION/TRAINING PROGRAM

## 2025-03-07 PROCEDURE — 99232 SBSQ HOSP IP/OBS MODERATE 35: CPT | Performed by: INTERNAL MEDICINE

## 2025-03-07 PROCEDURE — 94761 N-INVAS EAR/PLS OXIMETRY MLT: CPT

## 2025-03-07 PROCEDURE — 93005 ELECTROCARDIOGRAM TRACING: CPT | Performed by: INTERNAL MEDICINE

## 2025-03-07 RX ORDER — DILTIAZEM HCL 60 MG
60 TABLET ORAL EVERY 8 HOURS SCHEDULED
Status: DISCONTINUED | OUTPATIENT
Start: 2025-03-07 | End: 2025-03-25 | Stop reason: HOSPADM

## 2025-03-07 RX ORDER — PROCHLORPERAZINE MALEATE 10 MG
10 TABLET ORAL EVERY 8 HOURS PRN
Status: DISCONTINUED | OUTPATIENT
Start: 2025-03-07 | End: 2025-03-25 | Stop reason: HOSPADM

## 2025-03-07 RX ORDER — METOPROLOL TARTRATE 50 MG
50 TABLET ORAL 2 TIMES DAILY
Status: DISCONTINUED | OUTPATIENT
Start: 2025-03-07 | End: 2025-03-25 | Stop reason: HOSPADM

## 2025-03-07 RX ORDER — SODIUM CHLORIDE 450 MG/100ML
INJECTION, SOLUTION INTRAVENOUS CONTINUOUS
Status: DISCONTINUED | OUTPATIENT
Start: 2025-03-07 | End: 2025-03-09

## 2025-03-07 RX ORDER — QUETIAPINE FUMARATE 25 MG/1
25 TABLET, FILM COATED ORAL 2 TIMES DAILY
Status: DISCONTINUED | OUTPATIENT
Start: 2025-03-07 | End: 2025-03-12

## 2025-03-07 RX ORDER — METOPROLOL TARTRATE 25 MG/1
25 TABLET, FILM COATED ORAL ONCE
Status: COMPLETED | OUTPATIENT
Start: 2025-03-07 | End: 2025-03-07

## 2025-03-07 RX ORDER — PROCHLORPERAZINE EDISYLATE 5 MG/ML
10 INJECTION INTRAMUSCULAR; INTRAVENOUS EVERY 6 HOURS PRN
Status: DISCONTINUED | OUTPATIENT
Start: 2025-03-07 | End: 2025-03-09

## 2025-03-07 RX ADMIN — SODIUM CHLORIDE, PRESERVATIVE FREE 10 ML: 5 INJECTION INTRAVENOUS at 20:04

## 2025-03-07 RX ADMIN — ALBUTEROL SULFATE 2.5 MG: 2.5 SOLUTION RESPIRATORY (INHALATION) at 19:51

## 2025-03-07 RX ADMIN — SODIUM CHLORIDE: 4.5 INJECTION, SOLUTION INTRAVENOUS at 11:18

## 2025-03-07 RX ADMIN — METOCLOPRAMIDE 10 MG: 5 INJECTION, SOLUTION INTRAMUSCULAR; INTRAVENOUS at 11:15

## 2025-03-07 RX ADMIN — METOPROLOL TARTRATE 5 MG: 5 INJECTION INTRAVENOUS at 06:23

## 2025-03-07 RX ADMIN — ATORVASTATIN CALCIUM 40 MG: 40 TABLET, FILM COATED ORAL at 11:10

## 2025-03-07 RX ADMIN — ENOXAPARIN SODIUM 40 MG: 100 INJECTION SUBCUTANEOUS at 11:10

## 2025-03-07 RX ADMIN — ALBUTEROL SULFATE 2.5 MG: 2.5 SOLUTION RESPIRATORY (INHALATION) at 11:36

## 2025-03-07 RX ADMIN — ALLOPURINOL 300 MG: 300 TABLET ORAL at 11:11

## 2025-03-07 RX ADMIN — LEVETIRACETAM 750 MG: 100 INJECTION INTRAVENOUS at 23:25

## 2025-03-07 RX ADMIN — Medication 5000 UNITS: at 11:11

## 2025-03-07 RX ADMIN — DILTIAZEM HYDROCHLORIDE 60 MG: 60 TABLET ORAL at 23:25

## 2025-03-07 RX ADMIN — METOCLOPRAMIDE 10 MG: 5 INJECTION, SOLUTION INTRAMUSCULAR; INTRAVENOUS at 15:51

## 2025-03-07 RX ADMIN — METOPROLOL TARTRATE 50 MG: 50 TABLET, FILM COATED ORAL at 20:02

## 2025-03-07 RX ADMIN — DILTIAZEM HYDROCHLORIDE 60 MG: 60 TABLET ORAL at 15:51

## 2025-03-07 RX ADMIN — PANTOPRAZOLE SODIUM 40 MG: 40 INJECTION, POWDER, LYOPHILIZED, FOR SOLUTION INTRAVENOUS at 11:11

## 2025-03-07 RX ADMIN — METOCLOPRAMIDE 10 MG: 5 INJECTION, SOLUTION INTRAMUSCULAR; INTRAVENOUS at 02:35

## 2025-03-07 RX ADMIN — GABAPENTIN 200 MG: 250 SOLUTION ORAL at 20:03

## 2025-03-07 RX ADMIN — METOPROLOL TARTRATE 25 MG: 25 TABLET, FILM COATED ORAL at 11:11

## 2025-03-07 RX ADMIN — DULOXETINE 60 MG: 60 CAPSULE, DELAYED RELEASE ORAL at 11:11

## 2025-03-07 RX ADMIN — METOCLOPRAMIDE 10 MG: 5 INJECTION, SOLUTION INTRAMUSCULAR; INTRAVENOUS at 20:02

## 2025-03-07 RX ADMIN — QUETIAPINE FUMARATE 25 MG: 25 TABLET ORAL at 11:12

## 2025-03-07 RX ADMIN — DILTIAZEM HYDROCHLORIDE 60 MG: 60 TABLET ORAL at 02:35

## 2025-03-07 RX ADMIN — QUETIAPINE FUMARATE 25 MG: 25 TABLET ORAL at 20:03

## 2025-03-07 RX ADMIN — THIAMINE HYDROCHLORIDE 200 MG: 100 INJECTION, SOLUTION INTRAMUSCULAR; INTRAVENOUS at 11:30

## 2025-03-07 RX ADMIN — ALBUTEROL SULFATE 2.5 MG: 2.5 SOLUTION RESPIRATORY (INHALATION) at 07:56

## 2025-03-07 RX ADMIN — LEVETIRACETAM 750 MG: 100 INJECTION INTRAVENOUS at 11:11

## 2025-03-07 RX ADMIN — GABAPENTIN 200 MG: 250 SOLUTION ORAL at 11:10

## 2025-03-07 RX ADMIN — ALBUTEROL SULFATE 2.5 MG: 2.5 SOLUTION RESPIRATORY (INHALATION) at 15:15

## 2025-03-07 NOTE — PROGRESS NOTES
Neurology Follow-up  [facility]  Janes Sainz MD    Date of Service: 03/07/25        Sandor Early is a 69 y.o. 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    Subjective:   CC: Follow up today regarding: Seizure-like activity (bilateral shaking with retained awareness) and encephalopathy in the setting of alcoholic cirrhosis, alcohol withdrawal, acute on chronic respiratory failure, enteritis with partial small bowel obstruction    Events noted. Chart and lab reviewed.  Per discussion with ICU nurse, he was recently extubated and has been on BiPAP since.  He has been reaching at the BiPAP purposefully and interactive with nursing staff intermittently but later today has been more drowsy and less interactive.    ROS : A 10-12 system review obtained and updated today and is unremarkable except as mentioned  in my interval history.     Medication, past medical history, social history, and family history reviewed.    Physical Examination    Mental status:   Lethargic  Wearing BiPAP limiting communication  Does not follow commands  Does not regard examiner    Cranial nerves:  PERRL  Blinks to threat bilateral  Oculocephalic reflex present bilateral  Corneal reflex present bilateral    Motor/sensory:   Nonpurposeful response to noxious stimuli in the bilateral upper extremities  No response to noxious stimuli in the bilateral lower

## 2025-03-07 NOTE — PROGRESS NOTES
03/06/25 2307   NIV Type   NIV Started/Stopped On   Equipment Type V60   Mode Bilevel   Mask Type Full face mask   Mask Size Extra large   Assessment   Pulse (!) 125   Respirations 27   SpO2 94 %   Level of Consciousness 0   Comfort Level Good   Using Accessory Muscles No   Mask Compliance Good   Skin Assessment Clean, dry, & intact   Skin Protection for O2 Device Yes   Orientation Middle   Location Nose   Intervention(s) Skin Barrier   Settings/Measurements   PIP Observed 16 cm H20   IPAP 15 cmH20   CPAP/EPAP 5 cmH2O   Vt (Measured) 521 mL   Rate Ordered 18   Insp Rise Time (%) 1 %   FiO2  30 %   I Time/ I Time % 0.9 s   Minute Volume (L/min) 10.2 Liters   Mask Leak (lpm) 51 lpm   Patient's Home Machine No   Alarm Settings   Alarms On Y   Low Pressure (cmH2O) 5 cmH2O   High Pressure (cmH2O) 40 cmH2O   RR Low (bpm) 12   RR High (bpm) 40 br/min   Oxygen Therapy/Pulse Ox   O2 Therapy Oxygen humidified   O2 Device (S)  PAP (positive airway pressure)   Pulse Oximeter Device Mode Continuous   Pulse Oximeter Device Location Finger

## 2025-03-07 NOTE — PROGRESS NOTES
Patient bathed, no void per external catheter, bladder scan shows 430ml in bladder, straight cath removed 400ml beryl with sediment, slight resistance noted on insertion, tolerated well, incontinence brief replaced, barrier cream used.  Small smear of stool.

## 2025-03-07 NOTE — PROGRESS NOTES
Speech Language Pathology  Attempt Note     Name: Sandor Early  : 1955  Medical Diagnosis: Hypomagnesemia [E83.42]  Influenza A [J10.1]  COPD exacerbation (HCC) [J44.1]  Respiratory failure with hypoxia (HCC) [J96.91]  Alcohol use disorder [F10.90]  Acute on chronic respiratory failure with hypoxia and hypercapnia (HCC) [J96.21, J96.22]  Acute respiratory failure (HCC) [J96.00]      SLP attempted to see pt for dysphagia tx. Treatment unable to be completed as pt requiring BiPAP. Per MD note, pt having thick secretions\" and requiring NT suctioning. Pt remains NPO. Now has NGT with bridle. Hold ST today per Manuel/CONSTANTINO. No charges.    Thank you,    Tayla Shelby M.A. SLP  Speech-Language Pathologist  [place] Lic #[id number]  [phone]

## 2025-03-07 NOTE — PROGRESS NOTES
RT Inhaler-Nebulizer Bronchodilator Protocol Note    There is a bronchodilator order in the chart from a provider indicating to follow the RT Bronchodilator Protocol and there is an “Initiate RT Inhaler-Nebulizer Bronchodilator Protocol” order as well (see protocol at bottom of note).    CXR Findings:  No results found.    The findings from the last RT Protocol Assessment were as follows:   History Pulmonary Disease: Chronic pulmonary disease  Respiratory Pattern: Mild dyspnea at rest, irregular pattern, or RR 21-25 bpm  Breath Sounds: Slightly diminished and/or crackles  Cough: Weak, productive  Indication for Bronchodilator Therapy: Decreased or absent breath sounds  Bronchodilator Assessment Score: 10    Aerosolized bronchodilator medication orders have been revised according to the RT Inhaler-Nebulizer Bronchodilator Protocol below.    Respiratory Therapist to perform RT Therapy Protocol Assessment initially then follow the protocol.  Repeat RT Therapy Protocol Assessment PRN for score 0-3 or on second treatment, BID, and PRN for scores above 3.    No Indications - adjust the frequency to every 6 hours PRN wheezing or bronchospasm, if no treatments needed after 48 hours then discontinue using Per Protocol order mode.     If indication present, adjust the RT bronchodilator orders based on the Bronchodilator Assessment Score as indicated below.  Use Inhaler orders unless patient has one or more of the following: on home nebulizer, not able to hold breath for 10 seconds, is not alert and oriented, cannot activate and use MDI correctly, or respiratory rate 25 breaths per minute or more, then use the equivalent nebulizer order(s) with same Frequency and PRN reasons based on the score.  If a patient is on this medication at home then do not decrease Frequency below that used at home.    0-3 - enter or revise RT bronchodilator order(s) to equivalent RT Bronchodilator order with Frequency of every 4 hours PRN for

## 2025-03-07 NOTE — PROGRESS NOTES
Patient assessment complete, oxygen on 4 liters HFNC, moist cough, does not cough secretions up per self, suctioned orally.  Alert to self, calm, not hollering out, nods appropriately, follows directions, not picking at lines or equipment or trying to get out of bed at this time.  Rhonchi bilaterally. Bowel sounds active.  BM today.  NGT to left nare, tube feeding re-started today.  Pure Wick for voids, brief in place.  Edema and redness to right arm improved.  Repositioned, call light in reach.  Will continue to monitor.  Bed alarm on, tele sitter active.

## 2025-03-07 NOTE — CARE COORDINATION
Patient currently on 4L NC.  Spoke with Manuel MEEKS.  Patient is not ready for discharge.  Will need precert for [facility] when appropriate.     Per message from Dr. Mckeon - start precert on Monday.

## 2025-03-07 NOTE — PROGRESS NOTES
RT Inhaler-Nebulizer Bronchodilator Protocol Note    There is a bronchodilator order in the chart from a provider indicating to follow the RT Bronchodilator Protocol and there is an “Initiate RT Inhaler-Nebulizer Bronchodilator Protocol” order as well (see protocol at bottom of note).    CXR Findings:  No results found.    The findings from the last RT Protocol Assessment were as follows:   History Pulmonary Disease: (P) Chronic pulmonary disease  Respiratory Pattern: (P) Mild dyspnea at rest, irregular pattern, or RR 21-25 bpm  Breath Sounds: (P) Inspiratory and expiratory or bilateral wheezing and/or rhonchi  Cough: (P) Weak, productive  Indication for Bronchodilator Therapy: (P) Wheezing associated with pulm disorder  Bronchodilator Assessment Score: (P) 14    Aerosolized bronchodilator medication orders have been revised according to the RT Inhaler-Nebulizer Bronchodilator Protocol below.    Respiratory Therapist to perform RT Therapy Protocol Assessment initially then follow the protocol.  Repeat RT Therapy Protocol Assessment PRN for score 0-3 or on second treatment, BID, and PRN for scores above 3.    No Indications - adjust the frequency to every 6 hours PRN wheezing or bronchospasm, if no treatments needed after 48 hours then discontinue using Per Protocol order mode.     If indication present, adjust the RT bronchodilator orders based on the Bronchodilator Assessment Score as indicated below.  Use Inhaler orders unless patient has one or more of the following: on home nebulizer, not able to hold breath for 10 seconds, is not alert and oriented, cannot activate and use MDI correctly, or respiratory rate 25 breaths per minute or more, then use the equivalent nebulizer order(s) with same Frequency and PRN reasons based on the score.  If a patient is on this medication at home then do not decrease Frequency below that used at home.    0-3 - enter or revise RT bronchodilator order(s) to equivalent RT  Bronchodilator order with Frequency of every 4 hours PRN for wheezing or increased work of breathing using Per Protocol order mode.        4-6 - enter or revise RT Bronchodilator order(s) to two equivalent RT bronchodilator orders with one order with BID Frequency and one order with Frequency of every 4 hours PRN wheezing or increased work of breathing using Per Protocol order mode.        7-10 - enter or revise RT Bronchodilator order(s) to two equivalent RT bronchodilator orders with one order with TID Frequency and one order with Frequency of every 4 hours PRN wheezing or increased work of breathing using Per Protocol order mode.       11-13 - enter or revise RT Bronchodilator order(s) to one equivalent RT bronchodilator order with QID Frequency and an Albuterol order with Frequency of every 4 hours PRN wheezing or increased work of breathing using Per Protocol order mode.      Greater than 13 - enter or revise RT Bronchodilator order(s) to one equivalent RT bronchodilator order with every 4 hours Frequency and an Albuterol order with Frequency of every 2 hours PRN wheezing or increased work of breathing using Per Protocol order mode.         Electronically signed by Maicol Daley RCP on 3/7/2025 at 8:12 AM

## 2025-03-07 NOTE — PROGRESS NOTES
4 Eyes Skin Assessment     NAME:  Sandor Early  YOB: 1955  MEDICAL RECORD NUMBER:  [id number]    The patient is being assessed for  Other bathing.    I agree that at least one RN has performed a thorough Head to Toe Skin Assessment on the patient. ALL assessment sites listed below have been assessed.      Areas assessed by both nurses:  Bernardo Shi and Charissa     Head, Face, Ears, Shoulders, Back, Chest, Arms, Elbows, Hands, Sacrum. Buttock, Coccyx, Ischium, Legs. Feet and Heels, and Under Medical Devices         Does the Patient have a Wound? No noted wound(s)    Excoriation buttock and radha area, scratches near buttock, right arm tear with mipilex, redness left arm.       Abraham Prevention initiated by RN: Yes  Wound Care Orders initiated by RN: No    Pressure Injury (Stage 3,4, Unstageable, DTI, NWPT, and Complex wounds) if present, place Wound referral order by RN under : No    New Ostomies, if present place, Ostomy referral order under : No     Nurse 1 eSignature: Electronically signed by Bernardo De Leon RN on 3/7/25 at 4:13 AM EST    **SHARE this note so that the co-signing nurse can place an eSignature**    Nurse 2 eSignature: Electronically signed by Charissa Daugherty RN on 3/7/25 at 4:41 AM EST

## 2025-03-07 NOTE — PROGRESS NOTES
Order to hold  seroquel if QTC > 525  Currently EKG showed 474 secs  Sabiha Cid Pharm D 3/7/678824:09 AM  .

## 2025-03-07 NOTE — PROGRESS NOTES
03/07/25 0332   NIV Type   NIV Started/Stopped On   Equipment Type V60   Mode Bilevel   Mask Type Full face mask   Mask Size Extra large   Assessment   Pulse (!) 118   Respirations 22   SpO2 98 %   Level of Consciousness 0   Comfort Level Good   Using Accessory Muscles No   Mask Compliance Good   Skin Assessment Clean, dry, & intact   Skin Protection for O2 Device Yes   Orientation Middle   Location Nose   Intervention(s) Skin Barrier   Settings/Measurements   PIP Observed 16 cm H20   IPAP 15 cmH20   CPAP/EPAP 5 cmH2O   Vt (Measured) 545 mL   Rate Ordered 18   Insp Rise Time (%) 1 %   FiO2  50 %   I Time/ I Time % 0.9 s   Minute Volume (L/min) 11.5 Liters   Mask Leak (lpm) 62 lpm   Patient's Home Machine No   Alarm Settings   Alarms On Y   Low Pressure (cmH2O) 5 cmH2O   High Pressure (cmH2O) 40 cmH2O   RR Low (bpm) 12   RR High (bpm) 40 br/min   Oxygen Therapy/Pulse Ox   O2 Therapy Oxygen humidified   O2 Device PAP (positive airway pressure)   Pulse Oximeter Device Mode Continuous   Pulse Oximeter Device Location Finger

## 2025-03-07 NOTE — PROGRESS NOTES
Pulmonary & Critical Care Medicine ICU Progress Note    CC: COPD respiratory failure    Events of Last 24 hours: used bipap overnight  Haldol is helping  Vascular lines: IV: Left IJ 2/14-2/26. R Midline 2/25    MV: 2/14-3/4/25  Vent Mode: AC/VC Resp Rate (Set): 0 bpm/Vt (Set, mL): 0 mL/ /FiO2 : 50 %  No results for input(s): \"PHART\", \"GGI1SVC\", \"PO2ART\" in the last 72 hours.      IV:   sodium chloride      sodium chloride      dextrose         Vitals:  Blood pressure (!) 163/72, pulse (!) 124, temperature 99.2 °F (37.3 °C), temperature source Axillary, resp. rate 29, height 1.702 m (5' 7.01\"), weight 79 kg (174 lb 2.6 oz), SpO2 96%.  on 4L    EXAM:  General: ill appearing.    Eyes: No sclera icterus. No conjunctival injection.  ENT: No discharge.   Neck: Trachea midline.   Resp: No accessory muscle use. Few crackles. No wheezing. + rhonchi.   CV: Regular  rate. Regular rhythm. No mumur or rub. + LE edema.   GI: Non-tender. Non-distended.   Skin: Warm and dry. No rash on exposed extremities.  M/S: No cyanosis. No clubbing.   Neuro: intermittently + following commands.  Good cough.   Psych: mild agiation. Alert to name        Scheduled Meds:   dilTIAZem  60 mg Oral 3 times per day    levETIRAcetam  750 mg IntraVENous Q12H    thiamine (B-1) 200 mg in sodium chloride 0.9 % 100 mL IVPB  200 mg IntraVENous Q24H    metoprolol tartrate  25 mg Oral BID    polyethylene glycol  17 g Oral Daily    Gabapentin  200 mg Per NG tube BID    sodium chloride flush  5-40 mL IntraVENous 2 times per day    lidocaine 1 % injection  50 mg IntraDERmal Once    metoclopramide  10 mg IntraVENous Q6H    insulin lispro  0-16 Units SubCUTAneous Q4H    albuterol  2.5 mg Nebulization 4x Daily RT    sodium chloride flush  5-40 mL IntraVENous 2 times per day    pantoprazole (PROTONIX) 40 mg in sodium chloride (PF) 0.9 % 10 mL injection  40 mg IntraVENous Daily    enoxaparin  40 mg SubCUTAneous Daily    allopurinol  300 mg Oral Daily    vitamin D3

## 2025-03-07 NOTE — PROGRESS NOTES
Comprehensive Nutrition Assessment    Type and Reason for Visit:  Reassess    Nutrition Recommendations/Plan:   Continue TF regimen - ADULT TUBE FEEDING; Nasogastric tube; Standard with Fiber formula - Jevity 1.5 with a goal rate of 60 ml/hr x 20 hours + 120 ml water flushes every 3 hours for tube patency.   Monitor TF rate, intake, and tolerance + water flushes.   Monitor respiratory status, mental status, and plan of care.   Monitor nutrition-related labs, bowel function, and weight trends.      Malnutrition Assessment:  Malnutrition Status:  Severe malnutrition (03/07/25 1045)    Context:  Acute Illness     Findings of the 6 clinical characteristics of malnutrition:  Energy Intake:  50% or less of estimated energy requirements for 5 or more days  Weight Loss:  Unable to assess     Body Fat Loss:  Moderate body fat loss Orbital   Muscle Mass Loss:  Moderate muscle mass loss Clavicles (pectoralis & deltoids), Calf (gastrocnemius), Hand (interosseous)  Fluid Accumulation:  Mild Generalized (generalized +1 pitting edema)   Strength:  Not Performed    Nutrition Assessment:    patient continues to improve from a nutritional standpoint AEB TF was started via NG tube since patient could not pass swallow evaluation after extubation; patient remains at risk for further compromise d/t inability to consume po nutrition, need for EN as sole source of nutrition at this time, and altered nutrition-related labs; will continue Glucerna 1.5 with a goal rate of 60 ml/hr x 20 hours + increased water flushes to 120 ml every 3 hours since Na was elevated this am    Nutrition Related Findings:    patient was extubated to bipap on 3/4/25; patient was not able to pass swallow evaluation; NG tube was placed on 3/6/25 and TF was started via NG tube; patient was receiving TF at 40 ml/hr this am, per flow sheets data; he is A & O to person; + BM on 3/6/25; on high-dose SSI; Na was elevated this am so Dr. España started 1/2 NS at 50 ml/hr;

## 2025-03-07 NOTE — PROGRESS NOTES
03/07/25 1136   Cough/Sputum   Sputum How Obtained Suctioned;Nasal tracheal   $Obtained Sample $Nasotracheal Suction   Cough Productive   Sputum Amount Large   Sputum Color Creamy   Tenacity Thick

## 2025-03-07 NOTE — PROGRESS NOTES
Progress Note    Admit Date:  1/30/2025    Interval history    Tolerated pressure support 5/5 for 6 hours yesterday.  Plans for spontaneous breathing trial and possible extubation today.  On Precedex.    3/5-extubated on 3/4/2025.  Placed on BiPAP.  Has been intermittently confused.  Was on Precedex.  Has had some intermittent agitation.  When I saw the patient his eyes are open.   at bedside.  He has a good cough.    3/6- more calm then yesterday. On Bipap. Failed speech eval but they will see him again. Responding nicely to verbal commands,    3/7- more calm having thick secretions. ST has recommended MBS. He is NPO. Used Bipap last night. BP elevated today.      Objective:   Patient Vitals for the past 4 hrs:   BP Temp Temp src Pulse Resp SpO2   03/07/25 0900 (!) 167/64 -- -- (!) 130 29 99 %   03/07/25 0800 (!) 147/83 (!) 100.6 °F (38.1 °C) Axillary (!) 128 24 98 %   03/07/25 0700 (!) 163/72 -- -- (!) 124 29 96 %   03/07/25 0600 137/62 -- -- (!) 132 25 95 %          Intake/Output Summary (Last 24 hours) at 3/7/2025 0940  Last data filed at 3/7/2025 0245  Gross per 24 hour   Intake 665.03 ml   Output 892 ml   Net -226.97 ml       Physical Exam:  General: Awake and alert. Responds to commands. Ill appearing.   Mucous Membranes:  Pink , anicteric  Neck: No JVD, no carotid bruit, no thyromegaly  Chest: No accessory muscle use.  Minimal wheezing.  Few rhonchi.  Cardiovascular:  RRR S1S2 heard, no murmurs or gallops  Abdomen:  Soft, obese +distended, non tender, no organomegaly, BS absent  Extremities: trace edema to ext improved   Distal pulses well felt  Neurological awake and alert. Moves all 4 extremities.    cations:  dilTIAZem, 60 mg, 3 times per day  QUEtiapine, 25 mg, BID  metoprolol tartrate, 50 mg, BID  metoprolol tartrate, 25 mg, Once  levETIRAcetam, 750 mg, Q12H  thiamine (B-1) 200 mg in sodium chloride 0.9 % 100 mL IVPB, 200 mg, Q24H  polyethylene glycol, 17 g, Daily  Gabapentin, 200 mg,

## 2025-03-08 LAB
ANION GAP SERPL CALCULATED.3IONS-SCNC: 8 MMOL/L (ref 3–16)
BASE EXCESS BLDA CALC-SCNC: 8.2 MMOL/L (ref -3–3)
BUN SERPL-MCNC: 19 MG/DL (ref 7–20)
CALCIUM SERPL-MCNC: 8.9 MG/DL (ref 8.3–10.6)
CHLORIDE SERPL-SCNC: 108 MMOL/L (ref 99–110)
CO2 BLDA-SCNC: 35.8 MMOL/L
CO2 SERPL-SCNC: 31 MMOL/L (ref 21–32)
COHGB MFR BLDA: 0.3 % (ref 0–1.5)
CREAT SERPL-MCNC: 0.6 MG/DL (ref 0.8–1.3)
DEPRECATED RDW RBC AUTO: 17.1 % (ref 12.4–15.4)
GFR SERPLBLD CREATININE-BSD FMLA CKD-EPI: >90 ML/MIN/{1.73_M2}
GLUCOSE BLD-MCNC: 114 MG/DL (ref 70–99)
GLUCOSE BLD-MCNC: 131 MG/DL (ref 70–99)
GLUCOSE BLD-MCNC: 137 MG/DL (ref 70–99)
GLUCOSE BLD-MCNC: 138 MG/DL (ref 70–99)
GLUCOSE BLD-MCNC: 138 MG/DL (ref 70–99)
GLUCOSE BLD-MCNC: 139 MG/DL (ref 70–99)
GLUCOSE SERPL-MCNC: 114 MG/DL (ref 70–99)
HCO3 BLDA-SCNC: 34.2 MMOL/L (ref 21–29)
HCT VFR BLD AUTO: 30.6 % (ref 40.5–52.5)
HGB BLD-MCNC: 9.8 G/DL (ref 13.5–17.5)
HGB BLDA-MCNC: 10.1 G/DL (ref 13.5–17.5)
MAGNESIUM SERPL-MCNC: 1.57 MG/DL (ref 1.8–2.4)
MCH RBC QN AUTO: 29.1 PG (ref 26–34)
MCHC RBC AUTO-ENTMCNC: 32.1 G/DL (ref 31–36)
MCV RBC AUTO: 90.9 FL (ref 80–100)
METHGB MFR BLDA: 0.3 %
O2 THERAPY: ABNORMAL
PCO2 BLDA: 55 MMHG (ref 35–45)
PERFORMED ON: ABNORMAL
PH BLDA: 7.41 [PH] (ref 7.35–7.45)
PLATELET # BLD AUTO: 581 K/UL (ref 135–450)
PMV BLD AUTO: 7.6 FL (ref 5–10.5)
PO2 BLDA: 59 MMHG (ref 75–108)
POTASSIUM SERPL-SCNC: 3.5 MMOL/L (ref 3.5–5.1)
RBC # BLD AUTO: 3.36 M/UL (ref 4.2–5.9)
SAO2 % BLDA: 90.3 %
SODIUM SERPL-SCNC: 147 MMOL/L (ref 136–145)
WBC # BLD AUTO: 10.9 K/UL (ref 4–11)

## 2025-03-08 PROCEDURE — 51798 US URINE CAPACITY MEASURE: CPT

## 2025-03-08 PROCEDURE — 94761 N-INVAS EAR/PLS OXIMETRY MLT: CPT

## 2025-03-08 PROCEDURE — 94640 AIRWAY INHALATION TREATMENT: CPT

## 2025-03-08 PROCEDURE — 80048 BASIC METABOLIC PNL TOTAL CA: CPT

## 2025-03-08 PROCEDURE — 6360000002 HC RX W HCPCS: Performed by: INTERNAL MEDICINE

## 2025-03-08 PROCEDURE — 2580000003 HC RX 258: Performed by: INTERNAL MEDICINE

## 2025-03-08 PROCEDURE — 83735 ASSAY OF MAGNESIUM: CPT

## 2025-03-08 PROCEDURE — 2500000003 HC RX 250 WO HCPCS: Performed by: STUDENT IN AN ORGANIZED HEALTH CARE EDUCATION/TRAINING PROGRAM

## 2025-03-08 PROCEDURE — 2700000000 HC OXYGEN THERAPY PER DAY

## 2025-03-08 PROCEDURE — 31720 CLEARANCE OF AIRWAYS: CPT

## 2025-03-08 PROCEDURE — 6370000000 HC RX 637 (ALT 250 FOR IP): Performed by: INTERNAL MEDICINE

## 2025-03-08 PROCEDURE — 2500000003 HC RX 250 WO HCPCS: Performed by: INTERNAL MEDICINE

## 2025-03-08 PROCEDURE — 94669 MECHANICAL CHEST WALL OSCILL: CPT

## 2025-03-08 PROCEDURE — 2000000000 HC ICU R&B

## 2025-03-08 PROCEDURE — 94660 CPAP INITIATION&MGMT: CPT

## 2025-03-08 PROCEDURE — 6360000002 HC RX W HCPCS

## 2025-03-08 PROCEDURE — 99233 SBSQ HOSP IP/OBS HIGH 50: CPT | Performed by: INTERNAL MEDICINE

## 2025-03-08 PROCEDURE — 36415 COLL VENOUS BLD VENIPUNCTURE: CPT

## 2025-03-08 PROCEDURE — 51701 INSERT BLADDER CATHETER: CPT

## 2025-03-08 PROCEDURE — 85027 COMPLETE CBC AUTOMATED: CPT

## 2025-03-08 PROCEDURE — 6370000000 HC RX 637 (ALT 250 FOR IP): Performed by: STUDENT IN AN ORGANIZED HEALTH CARE EDUCATION/TRAINING PROGRAM

## 2025-03-08 PROCEDURE — 82803 BLOOD GASES ANY COMBINATION: CPT

## 2025-03-08 RX ORDER — CLONIDINE 0.1 MG/24H
1 PATCH, EXTENDED RELEASE TRANSDERMAL WEEKLY
Status: DISCONTINUED | OUTPATIENT
Start: 2025-03-08 | End: 2025-03-22

## 2025-03-08 RX ORDER — MULTIVIT-MIN/FERROUS GLUCONATE 9 MG/15 ML
15 LIQUID (ML) ORAL DAILY
Status: DISCONTINUED | OUTPATIENT
Start: 2025-03-08 | End: 2025-03-25 | Stop reason: HOSPADM

## 2025-03-08 RX ORDER — DEXMEDETOMIDINE HYDROCHLORIDE 4 UG/ML
.1-1.5 INJECTION, SOLUTION INTRAVENOUS CONTINUOUS
Status: DISCONTINUED | OUTPATIENT
Start: 2025-03-08 | End: 2025-03-09

## 2025-03-08 RX ORDER — POTASSIUM CHLORIDE 750 MG/1
40 TABLET, EXTENDED RELEASE ORAL PRN
Status: DISCONTINUED | OUTPATIENT
Start: 2025-03-08 | End: 2025-03-25 | Stop reason: HOSPADM

## 2025-03-08 RX ADMIN — QUETIAPINE FUMARATE 25 MG: 25 TABLET ORAL at 20:43

## 2025-03-08 RX ADMIN — METOPROLOL TARTRATE 50 MG: 50 TABLET, FILM COATED ORAL at 09:34

## 2025-03-08 RX ADMIN — DILTIAZEM HYDROCHLORIDE 60 MG: 60 TABLET ORAL at 15:00

## 2025-03-08 RX ADMIN — ATORVASTATIN CALCIUM 40 MG: 40 TABLET, FILM COATED ORAL at 09:34

## 2025-03-08 RX ADMIN — ALBUTEROL SULFATE 2.5 MG: 2.5 SOLUTION RESPIRATORY (INHALATION) at 20:02

## 2025-03-08 RX ADMIN — Medication 15 ML: at 09:34

## 2025-03-08 RX ADMIN — ALLOPURINOL 300 MG: 300 TABLET ORAL at 09:34

## 2025-03-08 RX ADMIN — LEVETIRACETAM 750 MG: 100 INJECTION INTRAVENOUS at 09:36

## 2025-03-08 RX ADMIN — SODIUM CHLORIDE: 4.5 INJECTION, SOLUTION INTRAVENOUS at 08:00

## 2025-03-08 RX ADMIN — Medication 5000 UNITS: at 09:34

## 2025-03-08 RX ADMIN — GABAPENTIN 200 MG: 250 SOLUTION ORAL at 20:44

## 2025-03-08 RX ADMIN — SODIUM CHLORIDE, PRESERVATIVE FREE 10 ML: 5 INJECTION INTRAVENOUS at 20:43

## 2025-03-08 RX ADMIN — GABAPENTIN 200 MG: 250 SOLUTION ORAL at 09:34

## 2025-03-08 RX ADMIN — POTASSIUM BICARBONATE 40 MEQ: 782 TABLET, EFFERVESCENT ORAL at 20:42

## 2025-03-08 RX ADMIN — METOCLOPRAMIDE 10 MG: 5 INJECTION, SOLUTION INTRAMUSCULAR; INTRAVENOUS at 14:30

## 2025-03-08 RX ADMIN — ALBUTEROL SULFATE 2.5 MG: 2.5 SOLUTION RESPIRATORY (INHALATION) at 15:32

## 2025-03-08 RX ADMIN — THIAMINE HYDROCHLORIDE 200 MG: 100 INJECTION, SOLUTION INTRAMUSCULAR; INTRAVENOUS at 12:21

## 2025-03-08 RX ADMIN — ALBUTEROL SULFATE 2.5 MG: 2.5 SOLUTION RESPIRATORY (INHALATION) at 07:59

## 2025-03-08 RX ADMIN — METOCLOPRAMIDE 10 MG: 5 INJECTION, SOLUTION INTRAMUSCULAR; INTRAVENOUS at 03:20

## 2025-03-08 RX ADMIN — METOCLOPRAMIDE 10 MG: 5 INJECTION, SOLUTION INTRAMUSCULAR; INTRAVENOUS at 20:43

## 2025-03-08 RX ADMIN — DEXMEDETOMIDINE HYDROCHLORIDE 0.2 MCG/KG/HR: 4 INJECTION, SOLUTION INTRAVENOUS at 21:40

## 2025-03-08 RX ADMIN — DULOXETINE 60 MG: 60 CAPSULE, DELAYED RELEASE ORAL at 09:34

## 2025-03-08 RX ADMIN — MAGNESIUM SULFATE HEPTAHYDRATE 2000 MG: 40 INJECTION, SOLUTION INTRAVENOUS at 20:47

## 2025-03-08 RX ADMIN — LEVETIRACETAM 750 MG: 100 INJECTION INTRAVENOUS at 23:06

## 2025-03-08 RX ADMIN — DILTIAZEM HYDROCHLORIDE 60 MG: 60 TABLET ORAL at 20:42

## 2025-03-08 RX ADMIN — METOCLOPRAMIDE 10 MG: 5 INJECTION, SOLUTION INTRAMUSCULAR; INTRAVENOUS at 09:35

## 2025-03-08 RX ADMIN — QUETIAPINE FUMARATE 25 MG: 25 TABLET ORAL at 09:34

## 2025-03-08 RX ADMIN — DILTIAZEM HYDROCHLORIDE 60 MG: 60 TABLET ORAL at 05:25

## 2025-03-08 RX ADMIN — ALBUTEROL SULFATE 2.5 MG: 2.5 SOLUTION RESPIRATORY (INHALATION) at 11:31

## 2025-03-08 RX ADMIN — PANTOPRAZOLE SODIUM 40 MG: 40 INJECTION, POWDER, LYOPHILIZED, FOR SOLUTION INTRAVENOUS at 09:35

## 2025-03-08 RX ADMIN — METOPROLOL TARTRATE 50 MG: 50 TABLET, FILM COATED ORAL at 20:43

## 2025-03-08 RX ADMIN — ENOXAPARIN SODIUM 40 MG: 100 INJECTION SUBCUTANEOUS at 09:34

## 2025-03-08 ASSESSMENT — PAIN SCALES - GENERAL: PAINLEVEL_OUTOF10: 0

## 2025-03-08 NOTE — PROGRESS NOTES
Pt has not urinated this shift. Bladder scan performed, >400. IM notified, verbal order for chavira cath. Chavira cath inserted using sterile technique, pt tolerated well.  Pt continues to desat, RT placed on bipap

## 2025-03-08 NOTE — PROGRESS NOTES
RT Inhaler-Nebulizer Bronchodilator Protocol Note    There is a bronchodilator order in the chart from a provider indicating to follow the RT Bronchodilator Protocol and there is an “Initiate RT Inhaler-Nebulizer Bronchodilator Protocol” order as well (see protocol at bottom of note).    CXR Findings:  No results found.    The findings from the last RT Protocol Assessment were as follows:   History Pulmonary Disease: (P) Chronic pulmonary disease  Respiratory Pattern: (P) Dyspnea on exertion or RR 21-25 bpm  Breath Sounds: (P) Inspiratory and expiratory or bilateral wheezing and/or rhonchi  Cough: (P) Weak, productive  Indication for Bronchodilator Therapy: (P) Decreased or absent breath sounds  Bronchodilator Assessment Score: (P) 12    Aerosolized bronchodilator medication orders have been revised according to the RT Inhaler-Nebulizer Bronchodilator Protocol below.    Respiratory Therapist to perform RT Therapy Protocol Assessment initially then follow the protocol.  Repeat RT Therapy Protocol Assessment PRN for score 0-3 or on second treatment, BID, and PRN for scores above 3.    No Indications - adjust the frequency to every 6 hours PRN wheezing or bronchospasm, if no treatments needed after 48 hours then discontinue using Per Protocol order mode.     If indication present, adjust the RT bronchodilator orders based on the Bronchodilator Assessment Score as indicated below.  Use Inhaler orders unless patient has one or more of the following: on home nebulizer, not able to hold breath for 10 seconds, is not alert and oriented, cannot activate and use MDI correctly, or respiratory rate 25 breaths per minute or more, then use the equivalent nebulizer order(s) with same Frequency and PRN reasons based on the score.  If a patient is on this medication at home then do not decrease Frequency below that used at home.    0-3 - enter or revise RT bronchodilator order(s) to equivalent RT Bronchodilator order with Frequency

## 2025-03-08 NOTE — PROGRESS NOTES
03/07/25 2326   NIV Type   NIV Started/Stopped On   Equipment Type V60   Mode Bilevel   Mask Type Full face mask   Mask Size Extra large   Assessment   Pulse (!) 116   Respirations 24   SpO2 93 %   Level of Consciousness 0   Comfort Level Good   Using Accessory Muscles No   Mask Compliance Good   Skin Assessment Clean, dry, & intact   Skin Protection for O2 Device Yes   Orientation Middle   Location Nose   Intervention(s) Skin Barrier   Settings/Measurements   PIP Observed 16 cm H20   IPAP 15 cmH20   CPAP/EPAP 5 cmH2O   Vt (Measured) 635 mL   Rate Ordered 18   Insp Rise Time (%) 1 %   FiO2  50 %   I Time/ I Time % 0.9 s   Minute Volume (L/min) 16.7 Liters   Mask Leak (lpm) 71 lpm   Patient's Home Machine No   Alarm Settings   Alarms On Y   Low Pressure (cmH2O) 5 cmH2O   High Pressure (cmH2O) 40 cmH2O   RR Low (bpm) 12   RR High (bpm) 40 br/min   Oxygen Therapy/Pulse Ox   O2 Therapy Oxygen humidified   O2 Device (S)  PAP (positive airway pressure)   Pulse Oximeter Device Mode Continuous   Pulse Oximeter Device Location Toe

## 2025-03-08 NOTE — PROGRESS NOTES
Pulmonary & Critical Care Medicine ICU Progress Note    CC: COPD respiratory failure    Events of Last 24 hours:  Intermittent agitation    Vascular lines: IV: Left IJ 2/14-2/26. R Midline 2/25    MV: 2/14-3/4/25  Vent Mode: AC/VC Resp Rate (Set): 0 bpm/Vt (Set, mL): 0 mL/ /FiO2 : 50 %  No results for input(s): \"PHART\", \"HIN2ESS\", \"PO2ART\" in the last 72 hours.      IV:   sodium chloride 50 mL/hr at 03/08/25 0436    sodium chloride      sodium chloride      dextrose         Vitals:  Blood pressure (!) 171/94, pulse (!) 126, temperature 98.2 °F (36.8 °C), temperature source Temporal, resp. rate 20, height 1.702 m (5' 7.01\"), weight 78.5 kg (173 lb 1 oz), SpO2 95%.  on 4L    EXAM:  General: ill appearing.    Eyes: No sclera icterus. No conjunctival injection.  ENT: No discharge.   Neck: Trachea midline.   Resp: No accessory muscle use. Few crackles. No wheezing. + rhonchi.   CV: Regular  rate. Regular rhythm. No mumur or rub. + LE edema.   GI: Non-tender. Non-distended.   Skin: Warm and dry. No rash on exposed extremities.  M/S: No cyanosis. No clubbing.   Neuro: intermittently + following commands.  Good cough.   Psych: mild agiation. Alert to name        Scheduled Meds:   cloNIDine  1 patch TransDERmal Weekly    CENTRUM/CERTA-KAYKAY with minerals oral  15 mL Oral Daily    dilTIAZem  60 mg Oral 3 times per day    QUEtiapine  25 mg Oral BID    metoprolol tartrate  50 mg Oral BID    levETIRAcetam  750 mg IntraVENous Q12H    thiamine (B-1) 200 mg in sodium chloride 0.9 % 100 mL IVPB  200 mg IntraVENous Q24H    polyethylene glycol  17 g Oral Daily    Gabapentin  200 mg Per NG tube BID    sodium chloride flush  5-40 mL IntraVENous 2 times per day    lidocaine 1 % injection  50 mg IntraDERmal Once    metoclopramide  10 mg IntraVENous Q6H    insulin lispro  0-16 Units SubCUTAneous Q4H    albuterol  2.5 mg Nebulization 4x Daily RT    sodium chloride flush  5-40 mL IntraVENous 2 times per day    pantoprazole (PROTONIX) 40 mg in

## 2025-03-08 NOTE — PROGRESS NOTES
Pt remains on 6 Lt High flow oxygen, BiPAP at night time, Setting 15/5 RR 18 FiO2 50%. N/G tube in Left nares, bridled at 64 cm. Glucerna 1.5 infusing at 60 ml/hr water flushes 120 ml Q 3 H. 2 PIV's in place. 0.45% NS Infusing at 50 ml/hr. Pure wick in place.  Pt alert and calm. Can follow simple commands at times. Assessment complete as charted.  Repositioned for comfort.  Call light in reach. Denies needs.  Will continue to monitor.

## 2025-03-08 NOTE — PROGRESS NOTES
RT Inhaler-Nebulizer Bronchodilator Protocol Note    There is a bronchodilator order in the chart from a provider indicating to follow the RT Bronchodilator Protocol and there is an “Initiate RT Inhaler-Nebulizer Bronchodilator Protocol” order as well (see protocol at bottom of note).    CXR Findings:  No results found.    The findings from the last RT Protocol Assessment were as follows:   History Pulmonary Disease: Chronic pulmonary disease  Respiratory Pattern: Mild dyspnea at rest, irregular pattern, or RR 21-25 bpm  Breath Sounds: Inspiratory and expiratory or bilateral wheezing and/or rhonchi  Cough: Weak, productive  Indication for Bronchodilator Therapy: Wheezing associated with pulm disorder  Bronchodilator Assessment Score: 14    Aerosolized bronchodilator medication orders have been revised according to the RT Inhaler-Nebulizer Bronchodilator Protocol below.    Respiratory Therapist to perform RT Therapy Protocol Assessment initially then follow the protocol.  Repeat RT Therapy Protocol Assessment PRN for score 0-3 or on second treatment, BID, and PRN for scores above 3.    No Indications - adjust the frequency to every 6 hours PRN wheezing or bronchospasm, if no treatments needed after 48 hours then discontinue using Per Protocol order mode.     If indication present, adjust the RT bronchodilator orders based on the Bronchodilator Assessment Score as indicated below.  Use Inhaler orders unless patient has one or more of the following: on home nebulizer, not able to hold breath for 10 seconds, is not alert and oriented, cannot activate and use MDI correctly, or respiratory rate 25 breaths per minute or more, then use the equivalent nebulizer order(s) with same Frequency and PRN reasons based on the score.  If a patient is on this medication at home then do not decrease Frequency below that used at home.    0-3 - enter or revise RT bronchodilator order(s) to equivalent RT Bronchodilator order with

## 2025-03-08 NOTE — PROGRESS NOTES
03/08/25 0306   NIV Type   Equipment Type v60   Mode Bilevel   Mask Type Full face mask   Mask Size Extra large   Assessment   Pulse (!) 106   Respirations 18   SpO2 99 %   Settings/Measurements   IPAP 15 cmH20   CPAP/EPAP 5 cmH2O   Vt (Measured) 571 mL   Rate Ordered 18   FiO2  50 %   Minute Volume (L/min) 12.7 Liters   Mask Leak (lpm) 47 lpm   Patient's Home Machine No   Patient Observation   Patient Observations spo2 96% on 50% bipap

## 2025-03-08 NOTE — PROGRESS NOTES
03/08/25 1710   NIV Type   Mode Bilevel   Mask Type Full face mask   Mask Size Extra large   Assessment   Respirations 24   SpO2 91 %   Settings/Measurements   IPAP 15 cmH20   CPAP/EPAP 5 cmH2O   Vt (Measured) 566 mL   Rate Ordered 18   FiO2  50 %

## 2025-03-08 NOTE — PROGRESS NOTES
felt  Neurological awake and alert. Moves all 4 extremities. Confused with gen weakness    cations:  cloNIDine, 1 patch, Weekly  CENTRUM/CERTA-KAYKAY with minerals oral, 15 mL, Daily  dilTIAZem, 60 mg, 3 times per day  QUEtiapine, 25 mg, BID  metoprolol tartrate, 50 mg, BID  levETIRAcetam, 750 mg, Q12H  thiamine (B-1) 200 mg in sodium chloride 0.9 % 100 mL IVPB, 200 mg, Q24H  polyethylene glycol, 17 g, Daily  Gabapentin, 200 mg, BID  sodium chloride flush, 5-40 mL, 2 times per day  lidocaine 1 % injection, 50 mg, Once  metoclopramide, 10 mg, Q6H  insulin lispro, 0-16 Units, Q4H  albuterol, 2.5 mg, 4x Daily RT  sodium chloride flush, 5-40 mL, 2 times per day  pantoprazole (PROTONIX) 40 mg in sodium chloride (PF) 0.9 % 10 mL injection, 40 mg, Daily  enoxaparin, 40 mg, Daily  allopurinol, 300 mg, Daily  vitamin D3, 5,000 Units, Daily  DULoxetine, 60 mg, Daily  atorvastatin, 40 mg, Daily      PRN Medications:  prochlorperazine, 10 mg, Q8H PRN   Or  prochlorperazine, 10 mg, Q6H PRN  metoprolol, 5 mg, Q6H PRN  albuterol, 2.5 mg, Q4H PRN  carboxymethylcellulose PF, 1 drop, PRN  sodium chloride flush, 5-40 mL, PRN  sodium chloride, , PRN  sodium chloride flush, 5-40 mL, PRN  diatrizoate meglumine-sodium, 12 mL, ONCE PRN  guaiFENesin, 200 mg, Q4H PRN  benzocaine, , 4x Daily PRN  phenol, 1 spray, Q2H PRN  sodium chloride, , PRN  acetaminophen, 650 mg, Q6H PRN   Or  acetaminophen, 650 mg, Q6H PRN  dextrose bolus, 125 mL, PRN   Or  dextrose bolus, 250 mL, PRN  glucagon (rDNA), 1 mg, PRN  dextrose, , Continuous PRN  potassium chloride, 10 mEq, PRN  magnesium sulfate, 2,000 mg, PRN  glucose, 4 tablet, PRN          Data:  CBC:   Recent Labs     03/06/25  0514 03/07/25  0655 03/08/25  0510   WBC 8.1 8.9 10.9   HGB 9.7* 9.7* 9.8*   HCT 29.8* 30.2* 30.6*   MCV 90.8 90.5 90.9   * 626* 581*     BMP:   Recent Labs     03/06/25  0514 03/07/25  0655 03/07/25  1450 03/08/25  0510    153* 147* 147*   K 3.5 4.0  --  3.5     jugular line are stable.         XR CHEST PORTABLE   Final Result   Increasing basilar opacities may reflect developing infiltrate/effusions.   Study is somewhat limited secondary to rotation but no other significant   change identified.         XR CHEST PORTABLE   Final Result   Interstitial and hazy opacities in the right lower lung more than left are   still present.      Endotracheal tube, nasogastric tube, and left jugular line.         XR CHEST PORTABLE   Final Result   Overall stable chest without significant interval change.         XR CHEST PORTABLE   Final Result   Endotracheal tube, nasogastric tube, and left jugular line are stable.  NG   side port is near the GE junction and could be advanced by 5-10 cm if desired.      Stable appearance of the lungs.         CT ABDOMEN PELVIS W IV CONTRAST Additional Contrast? None   Final Result   1. No evidence of bowel obstruction or perforation. Minimally dilated small   bowel loops likely related to mild ileus. There is improvement compared to   the previous evaluation.   2. Liver cirrhosis with mild ascites and mesenteric congestion.   3. Small bilateral pleural effusions and lower lobe atelectatic changes.   4. Punctate bilateral nephrolithiasis but no obstructive uropathy on either   side.   5. Aortobifemoral bypass graft with no obvious complications.   6. 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 Significant bilateral renal   artery stenosis near the origin.   7. Fat containing inguinal hernias without bowel involvement.   8. Mild fat containing periumbilical hernia.   9. Mild anasarca.         XR CHEST PORTABLE   Final Result   No significant interval change.         XR CHEST PORTABLE   Final Result   1.  Lines and tubes are stable.      2.  Coarse interstitial markings and interstitial opacities in the lower   lungs.  Correlate for bronchiolitis or atypical infection.         XR CHEST PORTABLE   Final Result   1. No significant change.         XR CHEST

## 2025-03-08 NOTE — PLAN OF CARE
Problem: Chronic Conditions and Co-morbidities  Goal: Patient's chronic conditions and co-morbidity symptoms are monitored and maintained or improved  Outcome: Progressing  Flowsheets (Taken 3/8/2025 0143)  Care Plan - Patient's Chronic Conditions and Co-Morbidity Symptoms are Monitored and Maintained or Improved:   Monitor and assess patient's chronic conditions and comorbid symptoms for stability, deterioration, or improvement   Collaborate with multidisciplinary team to address chronic and comorbid conditions and prevent exacerbation or deterioration     Problem: Safety - Adult  Goal: Free from fall injury  Outcome: Progressing  Flowsheets (Taken 3/8/2025 0143)  Free From Fall Injury: Instruct family/caregiver on patient safety     Problem: Pain  Goal: Verbalizes/displays adequate comfort level or baseline comfort level  Outcome: Progressing  Flowsheets (Taken 3/8/2025 0143)  Verbalizes/displays adequate comfort level or baseline comfort level:   Encourage patient to monitor pain and request assistance   Assess pain using appropriate pain scale   Administer analgesics based on type and severity of pain and evaluate response     Problem: Respiratory - Adult  Goal: Achieves optimal ventilation and oxygenation  Outcome: Progressing  Flowsheets (Taken 3/8/2025 0143)  Achieves optimal ventilation and oxygenation:   Assess for changes in respiratory status   Assess for changes in mentation and behavior   Position to facilitate oxygenation and minimize respiratory effort   Oxygen supplementation based on oxygen saturation or arterial blood gases     Problem: Cardiovascular - Adult  Goal: Maintains optimal cardiac output and hemodynamic stability  Outcome: Progressing  Flowsheets (Taken 3/8/2025 0143)  Maintains optimal cardiac output and hemodynamic stability:   Monitor blood pressure and heart rate   Monitor urine output and notify Licensed Independent Practitioner for values outside of normal range   Assess for

## 2025-03-08 NOTE — PROGRESS NOTES
Shift assessment completed as documented on flowsheet.  PT awake and alert, does not always follow commands. Lungs course throughout, diminished in bases. Call light in reach. Telesitter at bedside. Monitoring closely.

## 2025-03-08 NOTE — PROGRESS NOTES
4 Eyes Skin Assessment     NAME:  Sandor Early  YOB: 1955  MEDICAL RECORD NUMBER:  [id number]    The patient is being assessed for  Shift Handoff    I agree that at least one RN has performed a thorough Head to Toe Skin Assessment on the patient. ALL assessment sites listed below have been assessed.      Areas assessed by both nurses:    Head, Face, Ears, Shoulders, Back, Chest, Arms, Elbows, Hands, Sacrum. Buttock, Coccyx, Ischium, Legs. Feet and Heels, and Under Medical Devices         Does the Patient have a Wound? Yes wound(s) were present on assessment. LDA wound assessment was Initiated and completed by RN       Abraham Prevention initiated by RN: Yes  Wound Care Orders initiated by RN: Yes    Pressure Injury (Stage 3,4, Unstageable, DTI, NWPT, and Complex wounds) if present, place Wound referral order by RN under : No    New Ostomies, if present place, Ostomy referral order under : No     Nurse 1 eSignature: Electronically signed by Lois Baker RN on 3/8/25 at 1:49 AM EST    **SHARE this note so that the co-signing nurse can place an eSignature**    Nurse 2 eSignature: Electronically signed by Manolo Linn RN on 3/8/25 at 3:44 AM EST     Patient is not able to demonstrated the ability to move from a reclining position to an upright position within the recliner. Patient is confused, demented and /or unable to follow instruction.     149

## 2025-03-09 LAB
ALBUMIN SERPL-MCNC: 2.7 G/DL (ref 3.4–5)
ALP SERPL-CCNC: 177 U/L (ref 40–129)
ALT SERPL-CCNC: 14 U/L (ref 10–40)
ANION GAP SERPL CALCULATED.3IONS-SCNC: 6 MMOL/L (ref 3–16)
AST SERPL-CCNC: 22 U/L (ref 15–37)
BASE EXCESS BLDV CALC-SCNC: 5.6 MMOL/L (ref -3–3)
BILIRUB DIRECT SERPL-MCNC: <0.1 MG/DL (ref 0–0.3)
BILIRUB INDIRECT SERPL-MCNC: 0.2 MG/DL (ref 0–1)
BILIRUB SERPL-MCNC: 0.3 MG/DL (ref 0–1)
BUN SERPL-MCNC: 15 MG/DL (ref 7–20)
CALCIUM SERPL-MCNC: 8.7 MG/DL (ref 8.3–10.6)
CHLORIDE SERPL-SCNC: 100 MMOL/L (ref 99–110)
CO2 BLDV-SCNC: 29 MMOL/L
CO2 SERPL-SCNC: 32 MMOL/L (ref 21–32)
COHGB MFR BLDV: 4.3 % (ref 0–1.5)
CREAT SERPL-MCNC: 0.5 MG/DL (ref 0.8–1.3)
DEPRECATED RDW RBC AUTO: 17.3 % (ref 12.4–15.4)
GFR SERPLBLD CREATININE-BSD FMLA CKD-EPI: >90 ML/MIN/{1.73_M2}
GLUCOSE BLD-MCNC: 105 MG/DL (ref 70–99)
GLUCOSE BLD-MCNC: 111 MG/DL (ref 70–99)
GLUCOSE BLD-MCNC: 112 MG/DL (ref 70–99)
GLUCOSE BLD-MCNC: 123 MG/DL (ref 70–99)
GLUCOSE BLD-MCNC: 125 MG/DL (ref 70–99)
GLUCOSE BLD-MCNC: 129 MG/DL (ref 70–99)
GLUCOSE BLD-MCNC: 141 MG/DL (ref 70–99)
GLUCOSE SERPL-MCNC: 104 MG/DL (ref 70–99)
HCO3 BLDV-SCNC: 28.3 MMOL/L (ref 23–29)
HCT VFR BLD AUTO: 32.1 % (ref 40.5–52.5)
HGB BLD-MCNC: 10.3 G/DL (ref 13.5–17.5)
MAGNESIUM SERPL-MCNC: 2.25 MG/DL (ref 1.8–2.4)
MCH RBC QN AUTO: 29.2 PG (ref 26–34)
MCHC RBC AUTO-ENTMCNC: 32.1 G/DL (ref 31–36)
MCV RBC AUTO: 91.1 FL (ref 80–100)
METHGB MFR BLDV: 0.3 %
O2 CT VFR BLDV CALC: 16 VOL %
O2 THERAPY: ABNORMAL
PCO2 BLDV: 34.2 MMHG (ref 40–50)
PERFORMED ON: ABNORMAL
PH BLDV: 7.54 [PH] (ref 7.35–7.45)
PHOSPHATE SERPL-MCNC: 3.4 MG/DL (ref 2.5–4.9)
PLATELET # BLD AUTO: 480 K/UL (ref 135–450)
PMV BLD AUTO: 7.9 FL (ref 5–10.5)
PO2 BLDV: 227.1 MMHG (ref 25–40)
POTASSIUM SERPL-SCNC: 3.9 MMOL/L (ref 3.5–5.1)
PROT SERPL-MCNC: 8.6 G/DL (ref 6.4–8.2)
RBC # BLD AUTO: 3.52 M/UL (ref 4.2–5.9)
REASON FOR REJECTION: NORMAL
REJECTED TEST: NORMAL
SAO2 % BLDV: 100 %
SODIUM SERPL-SCNC: 138 MMOL/L (ref 136–145)
WBC # BLD AUTO: 9.7 K/UL (ref 4–11)

## 2025-03-09 PROCEDURE — 2000000000 HC ICU R&B

## 2025-03-09 PROCEDURE — 6370000000 HC RX 637 (ALT 250 FOR IP): Performed by: STUDENT IN AN ORGANIZED HEALTH CARE EDUCATION/TRAINING PROGRAM

## 2025-03-09 PROCEDURE — 80076 HEPATIC FUNCTION PANEL: CPT

## 2025-03-09 PROCEDURE — 94660 CPAP INITIATION&MGMT: CPT

## 2025-03-09 PROCEDURE — 99291 CRITICAL CARE FIRST HOUR: CPT | Performed by: INTERNAL MEDICINE

## 2025-03-09 PROCEDURE — 6370000000 HC RX 637 (ALT 250 FOR IP): Performed by: INTERNAL MEDICINE

## 2025-03-09 PROCEDURE — 2580000003 HC RX 258: Performed by: INTERNAL MEDICINE

## 2025-03-09 PROCEDURE — 2500000003 HC RX 250 WO HCPCS: Performed by: STUDENT IN AN ORGANIZED HEALTH CARE EDUCATION/TRAINING PROGRAM

## 2025-03-09 PROCEDURE — 2700000000 HC OXYGEN THERAPY PER DAY

## 2025-03-09 PROCEDURE — 85027 COMPLETE CBC AUTOMATED: CPT

## 2025-03-09 PROCEDURE — 84100 ASSAY OF PHOSPHORUS: CPT

## 2025-03-09 PROCEDURE — 36415 COLL VENOUS BLD VENIPUNCTURE: CPT

## 2025-03-09 PROCEDURE — 82803 BLOOD GASES ANY COMBINATION: CPT

## 2025-03-09 PROCEDURE — 83735 ASSAY OF MAGNESIUM: CPT

## 2025-03-09 PROCEDURE — 6360000002 HC RX W HCPCS: Performed by: INTERNAL MEDICINE

## 2025-03-09 PROCEDURE — 94640 AIRWAY INHALATION TREATMENT: CPT

## 2025-03-09 PROCEDURE — 94669 MECHANICAL CHEST WALL OSCILL: CPT

## 2025-03-09 PROCEDURE — 94761 N-INVAS EAR/PLS OXIMETRY MLT: CPT

## 2025-03-09 PROCEDURE — 80048 BASIC METABOLIC PNL TOTAL CA: CPT

## 2025-03-09 PROCEDURE — 99233 SBSQ HOSP IP/OBS HIGH 50: CPT | Performed by: INTERNAL MEDICINE

## 2025-03-09 PROCEDURE — 2500000003 HC RX 250 WO HCPCS: Performed by: INTERNAL MEDICINE

## 2025-03-09 PROCEDURE — 93005 ELECTROCARDIOGRAM TRACING: CPT | Performed by: INTERNAL MEDICINE

## 2025-03-09 PROCEDURE — 6360000002 HC RX W HCPCS

## 2025-03-09 RX ORDER — OXYCODONE HYDROCHLORIDE 5 MG/1
5 TABLET ORAL EVERY 8 HOURS PRN
Refills: 0 | Status: DISCONTINUED | OUTPATIENT
Start: 2025-03-09 | End: 2025-03-25 | Stop reason: HOSPADM

## 2025-03-09 RX ORDER — METOCLOPRAMIDE HYDROCHLORIDE 5 MG/ML
5 INJECTION INTRAMUSCULAR; INTRAVENOUS EVERY 6 HOURS
Status: DISCONTINUED | OUTPATIENT
Start: 2025-03-09 | End: 2025-03-11

## 2025-03-09 RX ORDER — LEVETIRACETAM 500 MG/1
750 TABLET ORAL 2 TIMES DAILY
Status: DISCONTINUED | OUTPATIENT
Start: 2025-03-09 | End: 2025-03-11

## 2025-03-09 RX ORDER — HALOPERIDOL 5 MG/ML
5 INJECTION INTRAMUSCULAR ONCE
Status: COMPLETED | OUTPATIENT
Start: 2025-03-09 | End: 2025-03-09

## 2025-03-09 RX ORDER — HALOPERIDOL 5 MG/ML
5 INJECTION INTRAMUSCULAR EVERY 4 HOURS PRN
Status: DISCONTINUED | OUTPATIENT
Start: 2025-03-09 | End: 2025-03-11

## 2025-03-09 RX ORDER — HALOPERIDOL 5 MG/ML
5 INJECTION INTRAMUSCULAR EVERY 6 HOURS PRN
Status: DISCONTINUED | OUTPATIENT
Start: 2025-03-09 | End: 2025-03-09

## 2025-03-09 RX ADMIN — ENOXAPARIN SODIUM 40 MG: 100 INJECTION SUBCUTANEOUS at 10:41

## 2025-03-09 RX ADMIN — THIAMINE HYDROCHLORIDE 200 MG: 100 INJECTION, SOLUTION INTRAMUSCULAR; INTRAVENOUS at 12:25

## 2025-03-09 RX ADMIN — SODIUM CHLORIDE: 4.5 INJECTION, SOLUTION INTRAVENOUS at 01:04

## 2025-03-09 RX ADMIN — SODIUM CHLORIDE, PRESERVATIVE FREE 10 ML: 5 INJECTION INTRAVENOUS at 19:56

## 2025-03-09 RX ADMIN — ALBUTEROL SULFATE 2.5 MG: 2.5 SOLUTION RESPIRATORY (INHALATION) at 19:51

## 2025-03-09 RX ADMIN — ALBUTEROL SULFATE 2.5 MG: 2.5 SOLUTION RESPIRATORY (INHALATION) at 15:54

## 2025-03-09 RX ADMIN — GABAPENTIN 200 MG: 250 SOLUTION ORAL at 10:40

## 2025-03-09 RX ADMIN — ATORVASTATIN CALCIUM 40 MG: 40 TABLET, FILM COATED ORAL at 10:41

## 2025-03-09 RX ADMIN — QUETIAPINE FUMARATE 25 MG: 25 TABLET ORAL at 19:55

## 2025-03-09 RX ADMIN — QUETIAPINE FUMARATE 25 MG: 25 TABLET ORAL at 10:41

## 2025-03-09 RX ADMIN — ALBUTEROL SULFATE 2.5 MG: 2.5 SOLUTION RESPIRATORY (INHALATION) at 07:05

## 2025-03-09 RX ADMIN — LEVETIRACETAM 750 MG: 500 TABLET, FILM COATED ORAL at 12:19

## 2025-03-09 RX ADMIN — HALOPERIDOL LACTATE 5 MG: 5 INJECTION, SOLUTION INTRAMUSCULAR at 14:07

## 2025-03-09 RX ADMIN — DULOXETINE 60 MG: 60 CAPSULE, DELAYED RELEASE ORAL at 10:41

## 2025-03-09 RX ADMIN — METOCLOPRAMIDE 10 MG: 5 INJECTION, SOLUTION INTRAMUSCULAR; INTRAVENOUS at 03:28

## 2025-03-09 RX ADMIN — ALBUTEROL SULFATE 2.5 MG: 2.5 SOLUTION RESPIRATORY (INHALATION) at 10:50

## 2025-03-09 RX ADMIN — METOPROLOL TARTRATE 50 MG: 50 TABLET, FILM COATED ORAL at 10:41

## 2025-03-09 RX ADMIN — METOCLOPRAMIDE 5 MG: 5 INJECTION, SOLUTION INTRAMUSCULAR; INTRAVENOUS at 14:08

## 2025-03-09 RX ADMIN — METOPROLOL TARTRATE 50 MG: 50 TABLET, FILM COATED ORAL at 19:55

## 2025-03-09 RX ADMIN — DILTIAZEM HYDROCHLORIDE 60 MG: 60 TABLET ORAL at 06:10

## 2025-03-09 RX ADMIN — LEVETIRACETAM 750 MG: 500 TABLET, FILM COATED ORAL at 19:55

## 2025-03-09 RX ADMIN — HALOPERIDOL LACTATE 5 MG: 5 INJECTION, SOLUTION INTRAMUSCULAR at 10:40

## 2025-03-09 RX ADMIN — METOCLOPRAMIDE 10 MG: 5 INJECTION, SOLUTION INTRAMUSCULAR; INTRAVENOUS at 10:40

## 2025-03-09 RX ADMIN — GABAPENTIN 200 MG: 250 SOLUTION ORAL at 19:55

## 2025-03-09 RX ADMIN — Medication 15 ML: at 12:19

## 2025-03-09 RX ADMIN — PANTOPRAZOLE SODIUM 40 MG: 40 INJECTION, POWDER, LYOPHILIZED, FOR SOLUTION INTRAVENOUS at 10:40

## 2025-03-09 RX ADMIN — METOCLOPRAMIDE 5 MG: 5 INJECTION, SOLUTION INTRAMUSCULAR; INTRAVENOUS at 19:55

## 2025-03-09 RX ADMIN — ALLOPURINOL 300 MG: 300 TABLET ORAL at 10:41

## 2025-03-09 RX ADMIN — DILTIAZEM HYDROCHLORIDE 60 MG: 60 TABLET ORAL at 14:08

## 2025-03-09 ASSESSMENT — PAIN SCALES - GENERAL: PAINLEVEL_OUTOF10: 0

## 2025-03-09 NOTE — PLAN OF CARE
Problem: Chronic Conditions and Co-morbidities  Goal: Patient's chronic conditions and co-morbidity symptoms are monitored and maintained or improved  3/8/2025 2317 by Lois Baker RN  Outcome: Progressing  Flowsheets (Taken 3/8/2025 2317)  Care Plan - Patient's Chronic Conditions and Co-Morbidity Symptoms are Monitored and Maintained or Improved:   Monitor and assess patient's chronic conditions and comorbid symptoms for stability, deterioration, or improvement   Collaborate with multidisciplinary team to address chronic and comorbid conditions and prevent exacerbation or deterioration  3/8/2025 1823 by Manuel Evans RN  Outcome: Progressing     Problem: Discharge Planning  Goal: Discharge to home or other facility with appropriate resources  3/8/2025 1823 by Manuel Evans RN  Outcome: Progressing     Problem: Safety - Adult  Goal: Free from fall injury  3/8/2025 2317 by Lois Baker RN  Outcome: Progressing  Flowsheets (Taken 3/8/2025 2317)  Free From Fall Injury: Instruct family/caregiver on patient safety  3/8/2025 1823 by Manuel Evans RN  Outcome: Progressing     Problem: Pain  Goal: Verbalizes/displays adequate comfort level or baseline comfort level  3/8/2025 2317 by Lois Baker RN  Outcome: Progressing  Flowsheets (Taken 3/8/2025 2317)  Verbalizes/displays adequate comfort level or baseline comfort level:   Encourage patient to monitor pain and request assistance   Assess pain using appropriate pain scale   Administer analgesics based on type and severity of pain and evaluate response  3/8/2025 1823 by Maunel Evans RN  Outcome: Progressing     Problem: Respiratory - Adult  Goal: Achieves optimal ventilation and oxygenation  3/8/2025 2317 by Lois Baker RN  Outcome: Progressing  Flowsheets (Taken 3/8/2025 2317)  Achieves optimal ventilation and oxygenation:   Assess for changes in respiratory status   Assess for changes in mentation and behavior   Position to facilitate oxygenation and

## 2025-03-09 NOTE — PROGRESS NOTES
Pulmonary & Critical Care Medicine ICU Progress Note    CC: COPD respiratory failure    Events of Last 24 hours:  Tolerating bipap at night  Night time restarted precedex because he was pulling off his O2    Vascular lines: IV: Left IJ 2/14-2/26. R Midline 2/25    MV: 2/14-3/4/25  Vent Mode: AC/VC Resp Rate (Set): 0 bpm/Vt (Set, mL): 0 mL/ /FiO2 : 45 %  Recent Labs     03/08/25  2135   PHART 7.411   IGT5TBA 55.0*   PO2ART 59.0*         IV:   dexmedeTOMIDine HCl in NaCl 0.2 mcg/kg/hr (03/09/25 0613)    sodium chloride      sodium chloride      dextrose         Vitals:  Blood pressure 129/60, pulse 83, temperature 99.6 °F (37.6 °C), temperature source Bladder, resp. rate 20, height 1.702 m (5' 7.01\"), weight 80.9 kg (178 lb 5.6 oz), SpO2 93%.  on 4L    EXAM:  General: ill appearing.    Eyes: No sclera icterus. No conjunctival injection.  ENT: No discharge.   Neck: Trachea midline.   Resp: No accessory muscle use. Few crackles. No wheezing. + rhonchi.   CV: Regular  rate. Regular rhythm. No mumur or rub. + LE edema.   GI: Non-tender. Non-distended.   Skin: Warm and dry. No rash on exposed extremities.  M/S: No cyanosis. No clubbing.   Neuro: intermittently + following commands.  Good cough.   Psych: mild agiation. Alert to name        Scheduled Meds:   levETIRAcetam  750 mg Oral BID    cloNIDine  1 patch TransDERmal Weekly    CENTRUM/CERTA-KAYKAY with minerals oral  15 mL Oral Daily    dilTIAZem  60 mg Oral 3 times per day    QUEtiapine  25 mg Oral BID    metoprolol tartrate  50 mg Oral BID    thiamine (B-1) 200 mg in sodium chloride 0.9 % 100 mL IVPB  200 mg IntraVENous Q24H    polyethylene glycol  17 g Oral Daily    Gabapentin  200 mg Per NG tube BID    sodium chloride flush  5-40 mL IntraVENous 2 times per day    metoclopramide  10 mg IntraVENous Q6H    insulin lispro  0-16 Units SubCUTAneous Q4H    albuterol  2.5 mg Nebulization 4x Daily RT    sodium chloride flush  5-40 mL IntraVENous 2 times per day    pantoprazole

## 2025-03-09 NOTE — PROGRESS NOTES
4 Eyes Skin Assessment     NAME:  Sandor Early  YOB: 1955  MEDICAL RECORD NUMBER:  [id number]    The patient is being assessed for  Shift Handoff    I agree that at least one RN has performed a thorough Head to Toe Skin Assessment on the patient. ALL assessment sites listed below have been assessed.      Areas assessed by both nurses:    Head, Face, Ears, Shoulders, Back, Chest, Arms, Elbows, Hands, Sacrum. Buttock, Coccyx, Ischium, Legs. Feet and Heels, and Under Medical Devices         Does the Patient have a Wound? Yes wound(s) were present on assessment. LDA wound assessment was Initiated and completed by RN       Abraham Prevention initiated by RN: Yes  Wound Care Orders initiated by RN: Yes    Pressure Injury (Stage 3,4, Unstageable, DTI, NWPT, and Complex wounds) if present, place Wound referral order by RN under : No    New Ostomies, if present place, Ostomy referral order under : No     Nurse 1 eSignature: Electronically signed by Lois Baker RN on 3/9/25 at 5:13 AM EDT    **SHARE this note so that the co-signing nurse can place an eSignature**    Nurse 2 eSignature: Electronically signed by Kylie Ramos RN on 3/9/25 at 6:16 AM EDT     Patient is not able to demonstrated the ability to move from a reclining position to an upright position within the recliner. Patient is confused, demented and /or unable to follow instruction.

## 2025-03-09 NOTE — PROGRESS NOTES
Pt remains on 8 Lt High flow oxygen, BiPAP at night time and PRN,  Setting 15/5 RR 18 FiO2 50%. N/G tube in Left nares, bridled at 64 cm. Glucerna 1.5 infusing at 60 ml/hr water flushes 120 ml Q 3 H. 2 PIV's in place. 0.45% NS Infusing at 100 ml/hr. Castrejon in place.  Pt Restless and agitated.  Assessment complete as charted.  Repositioned for comfort.  Call light in reach. Denies needs.  Will continue to monitor.

## 2025-03-09 NOTE — PROGRESS NOTES
Shift assessment completed as documented on flowsheeet. Pt resting comfortably with bipap and precedex drip. VSS monitoring closely.

## 2025-03-09 NOTE — PROGRESS NOTES
03/09/25 0709   NIV Type   Mode Bilevel   Mask Type Full face mask   Mask Size Extra large   Assessment   Respirations 20   SpO2 94 %   Settings/Measurements   IPAP 15 cmH20   CPAP/EPAP 5 cmH2O   Vt (Measured) 402 mL   Rate Ordered 18   FiO2  40 %        moderate

## 2025-03-09 NOTE — PROGRESS NOTES
Paged Dr. España for update on pt status per Manuel MEEKS Electronically signed by Yari Watson on 3/9/2025 at 4:04 PM

## 2025-03-09 NOTE — PROGRESS NOTES
Pt getting very restless and agitated. Keeps removing his BiPAP mask. Pt desaturates very quickly with out oxygen. Informed  and MD ordered ABG and Precedex drip. MD aware of ABG results.

## 2025-03-09 NOTE — PROGRESS NOTES
IM updated about tele showing ST seg elevation. EKG was completed and compared with EKG from Friday, no elevation at this time.

## 2025-03-09 NOTE — PROGRESS NOTES
03/08/25 2316   NIV Type   Equipment Type v60   Mode Bilevel   Mask Type Full face mask   Mask Size Extra large   Assessment   Pulse 80   Respirations 20   SpO2 92 %   Settings/Measurements   IPAP 15 cmH20   CPAP/EPAP 5 cmH2O   Vt (Measured) 909 mL   Rate Ordered 18   FiO2  50 %   Minute Volume (L/min) 12.7 Liters   Mask Leak (lpm) 45 lpm   Patient's Home Machine No   Patient Observation   Patient Observations spo2 93% on 50% bipap

## 2025-03-09 NOTE — PROGRESS NOTES
03/09/25 0316   NIV Type   Equipment Type v60   Mode Bilevel   Mask Type Full face mask   Mask Size Extra large   Assessment   Pulse 88   Respirations 17   SpO2 96 %   Settings/Measurements   IPAP 15 cmH20   CPAP/EPAP 5 cmH2O   Vt (Measured) 615 mL   Rate Ordered 18   FiO2  60 %   Minute Volume (L/min) 6.9 Liters   Mask Leak (lpm) 31 lpm   Patient's Home Machine No   Patient Observation   Patient Observations spo2 96% on 60% bipap

## 2025-03-09 NOTE — PROGRESS NOTES
Progress Note    Admit Date:  1/30/2025    Interval history    Admitted for flu, copd exacerbation, course complicated with development of sbo, resp failure needing vent support     Tolerated pressure support 5/5 for 6 hours yesterday.  Plans for spontaneous breathing trial and possible extubation today.  On Precedex.    3/5-extubated on 3/4/2025.  Placed on BiPAP.  Has been intermittently confused.  Was on Precedex.  Has had some intermittent agitation.  When I saw the patient his eyes are open.   at bedside.  He has a good cough.    3/6- more calm then yesterday. On Bipap. Failed speech eval but they will see him again. Responding nicely to verbal commands,    3/7- more calm having thick secretions. ST has recommended MBS. He is NPO. Used Bipap last night. BP elevated today.    3/9- MR singer seen awake, but confused, restless was given seroquel for agitation. Confused and not directable only for few minutes and gets restless, throwing stuff  Was on precedex overnight but now off    Moving all ext  Weak cough  Remains on NC   Tolerating TF      Objective:   Patient Vitals for the past 4 hrs:   BP Temp Temp src Pulse Resp SpO2 Weight   03/09/25 0718 -- -- -- -- -- 93 % --   03/09/25 0709 -- -- -- -- 20 94 % --   03/09/25 0705 -- -- -- 83 20 96 % --   03/09/25 0700 129/60 -- -- 79 16 95 % --   03/09/25 0600 (!) 129/57 -- -- 85 19 92 % --   03/09/25 0500 (!) 140/57 -- -- 88 19 98 % --   03/09/25 0400 (!) 118/57 99.6 °F (37.6 °C) Bladder 84 20 97 % 80.9 kg (178 lb 5.6 oz)          Intake/Output Summary (Last 24 hours) at 3/9/2025 0742  Last data filed at 3/9/2025 0357  Gross per 24 hour   Intake 3005.12 ml   Output 900 ml   Net 2105.12 ml       Physical Exam:  General: elderly male ill appearing , confused in bed   Awake and alert. Responds to commands  Mucous Membranes:  Pink , anicteric  Neck: No JVD, no carotid bruit, no thyromegaly  NG tube noted   Chest diminished in bases  No accessory muscle    -given IV zosyn 8 days, vancomycin 10 days, sputum with MRSA    - repeat steroids , HHn given- improved slowly and now off vent   -pulmonary managing   -He was extubated on 3/4/2025.  -On prn Haldol. Using Bipap as needed. -Mentation some improved. MBS recommended. -Having thick secretions.   Has NG and getting Tube feeds now     Enteritis with partial SBO resolved  -Ct with enteritis and also has several metabolic issues like above  -NG suction, NPO. Bowel rest given with no change  -General surgery consulted   -Enema and suppository with no benefit,  -given Zosyn, diflucan, vanc    -started TPN support with no return of bowel function.   -Now SBO slowly resolved.   -Monitored electrolytes and replaced as needed  -advance TF to goal rate      Acute metabolic encephalopathy   Developed confusion initially with alcohol withdrawal and possible seizure , later on vent support leading to post extubation deliri um     -supportive care. Ct head neg  -MRI brain ordered but did not tolerate, was incomplete +motion artifact  -had agitation and confusion noted needing precedex   -on Keppra per neurology  - resume home gabapentin and oxycodone at lower dose to help with withdrawal symptoms  - off all sedative meds  - seroquel/haldol now for agitation   - keep off precedex         Focal seizure involving LUE, history of childhood epilepsy   -Neurology consulted  -Seizure precautions  -EEG with mild gen slowing  -Started on keppra by neuro- switched to zonisamide temporarily to lower risk of possible behavioral issues. Zonisamide now held  -Keppra restarted    Normocytic anemia   -likely nutritional and iatrogenic  -Hgb 7.3 >7  sp 1 unit PRBC  -monitor CBC   -iron battery -stable       Type 2 diabetes with neuropathy.  -Continue Lantus insulin   -sliding scale insulin  -On gabapentin-holding        Hypomagnesemia.  -continue replacing as needed     Peripheral vascular disease  -Status post aortoiliac bypass graft  -CT abd with

## 2025-03-10 PROBLEM — G93.40 ACUTE ENCEPHALOPATHY: Status: ACTIVE | Noted: 2025-03-10

## 2025-03-10 LAB
AMMONIA PLAS-SCNC: 60 UMOL/L (ref 16–60)
ANION GAP SERPL CALCULATED.3IONS-SCNC: 8 MMOL/L (ref 3–16)
BUN SERPL-MCNC: 12 MG/DL (ref 7–20)
CALCIUM SERPL-MCNC: 8.5 MG/DL (ref 8.3–10.6)
CHLORIDE SERPL-SCNC: 102 MMOL/L (ref 99–110)
CO2 SERPL-SCNC: 31 MMOL/L (ref 21–32)
CREAT SERPL-MCNC: 0.6 MG/DL (ref 0.8–1.3)
DEPRECATED RDW RBC AUTO: 18.3 % (ref 12.4–15.4)
EKG ATRIAL RATE: 75 BPM
EKG DIAGNOSIS: NORMAL
EKG P AXIS: 57 DEGREES
EKG P-R INTERVAL: 220 MS
EKG Q-T INTERVAL: 372 MS
EKG QRS DURATION: 82 MS
EKG QTC CALCULATION (BAZETT): 415 MS
EKG R AXIS: -46 DEGREES
EKG T AXIS: 52 DEGREES
EKG VENTRICULAR RATE: 75 BPM
GFR SERPLBLD CREATININE-BSD FMLA CKD-EPI: >90 ML/MIN/{1.73_M2}
GLUCOSE BLD-MCNC: 117 MG/DL (ref 70–99)
GLUCOSE BLD-MCNC: 119 MG/DL (ref 70–99)
GLUCOSE BLD-MCNC: 127 MG/DL (ref 70–99)
GLUCOSE BLD-MCNC: 128 MG/DL (ref 70–99)
GLUCOSE BLD-MCNC: 128 MG/DL (ref 70–99)
GLUCOSE BLD-MCNC: 137 MG/DL (ref 70–99)
GLUCOSE SERPL-MCNC: 118 MG/DL (ref 70–99)
HCT VFR BLD AUTO: 30.9 % (ref 40.5–52.5)
HGB BLD-MCNC: 9.1 G/DL (ref 13.5–17.5)
MCH RBC QN AUTO: 28.6 PG (ref 26–34)
MCHC RBC AUTO-ENTMCNC: 29.6 G/DL (ref 31–36)
MCV RBC AUTO: 96.8 FL (ref 80–100)
PERFORMED ON: ABNORMAL
PLATELET # BLD AUTO: 395 K/UL (ref 135–450)
PLATELET BLD QL SMEAR: ADEQUATE
PMV BLD AUTO: 8.5 FL (ref 5–10.5)
POTASSIUM SERPL-SCNC: 3.8 MMOL/L (ref 3.5–5.1)
RBC # BLD AUTO: 3.19 M/UL (ref 4.2–5.9)
SLIDE REVIEW: ABNORMAL
SODIUM SERPL-SCNC: 141 MMOL/L (ref 136–145)
WBC # BLD AUTO: 10.5 K/UL (ref 4–11)

## 2025-03-10 PROCEDURE — 6370000000 HC RX 637 (ALT 250 FOR IP): Performed by: NEUROMUSCULOSKELETAL MEDICINE & OMM

## 2025-03-10 PROCEDURE — 85027 COMPLETE CBC AUTOMATED: CPT

## 2025-03-10 PROCEDURE — 6370000000 HC RX 637 (ALT 250 FOR IP): Performed by: INTERNAL MEDICINE

## 2025-03-10 PROCEDURE — 6360000002 HC RX W HCPCS: Performed by: INTERNAL MEDICINE

## 2025-03-10 PROCEDURE — 93010 ELECTROCARDIOGRAM REPORT: CPT | Performed by: INTERNAL MEDICINE

## 2025-03-10 PROCEDURE — 2580000003 HC RX 258: Performed by: INTERNAL MEDICINE

## 2025-03-10 PROCEDURE — 94761 N-INVAS EAR/PLS OXIMETRY MLT: CPT

## 2025-03-10 PROCEDURE — 2700000000 HC OXYGEN THERAPY PER DAY

## 2025-03-10 PROCEDURE — 80048 BASIC METABOLIC PNL TOTAL CA: CPT

## 2025-03-10 PROCEDURE — 6370000000 HC RX 637 (ALT 250 FOR IP): Performed by: STUDENT IN AN ORGANIZED HEALTH CARE EDUCATION/TRAINING PROGRAM

## 2025-03-10 PROCEDURE — 99233 SBSQ HOSP IP/OBS HIGH 50: CPT | Performed by: INTERNAL MEDICINE

## 2025-03-10 PROCEDURE — 94640 AIRWAY INHALATION TREATMENT: CPT

## 2025-03-10 PROCEDURE — 82140 ASSAY OF AMMONIA: CPT

## 2025-03-10 PROCEDURE — 2000000000 HC ICU R&B

## 2025-03-10 PROCEDURE — 2500000003 HC RX 250 WO HCPCS: Performed by: INTERNAL MEDICINE

## 2025-03-10 PROCEDURE — 94660 CPAP INITIATION&MGMT: CPT

## 2025-03-10 PROCEDURE — 36415 COLL VENOUS BLD VENIPUNCTURE: CPT

## 2025-03-10 PROCEDURE — 94669 MECHANICAL CHEST WALL OSCILL: CPT

## 2025-03-10 RX ORDER — HYDRALAZINE HYDROCHLORIDE 20 MG/ML
10 INJECTION INTRAMUSCULAR; INTRAVENOUS EVERY 6 HOURS PRN
Status: DISCONTINUED | OUTPATIENT
Start: 2025-03-10 | End: 2025-03-25 | Stop reason: HOSPADM

## 2025-03-10 RX ORDER — ZONISAMIDE 25 MG/1
50 CAPSULE ORAL 2 TIMES DAILY
Status: DISCONTINUED | OUTPATIENT
Start: 2025-03-10 | End: 2025-03-18

## 2025-03-10 RX ORDER — GABAPENTIN 250 MG/5ML
200 SOLUTION ORAL EVERY 6 HOURS
Status: DISCONTINUED | OUTPATIENT
Start: 2025-03-10 | End: 2025-03-25 | Stop reason: HOSPADM

## 2025-03-10 RX ORDER — DULOXETIN HYDROCHLORIDE 30 MG/1
30 CAPSULE, DELAYED RELEASE ORAL DAILY
Status: DISCONTINUED | OUTPATIENT
Start: 2025-03-11 | End: 2025-03-25 | Stop reason: HOSPADM

## 2025-03-10 RX ADMIN — METOCLOPRAMIDE 5 MG: 5 INJECTION, SOLUTION INTRAMUSCULAR; INTRAVENOUS at 20:33

## 2025-03-10 RX ADMIN — ATORVASTATIN CALCIUM 40 MG: 40 TABLET, FILM COATED ORAL at 09:01

## 2025-03-10 RX ADMIN — QUETIAPINE FUMARATE 25 MG: 25 TABLET ORAL at 09:01

## 2025-03-10 RX ADMIN — DILTIAZEM HYDROCHLORIDE 60 MG: 60 TABLET ORAL at 06:56

## 2025-03-10 RX ADMIN — ENOXAPARIN SODIUM 40 MG: 100 INJECTION SUBCUTANEOUS at 09:00

## 2025-03-10 RX ADMIN — POLYETHYLENE GLYCOL (3350) 17 G: 17 POWDER, FOR SOLUTION ORAL at 09:00

## 2025-03-10 RX ADMIN — HALOPERIDOL LACTATE 5 MG: 5 INJECTION, SOLUTION INTRAMUSCULAR at 06:56

## 2025-03-10 RX ADMIN — QUETIAPINE FUMARATE 25 MG: 25 TABLET ORAL at 20:57

## 2025-03-10 RX ADMIN — DILTIAZEM HYDROCHLORIDE 60 MG: 60 TABLET ORAL at 00:04

## 2025-03-10 RX ADMIN — ZONISAMIDE 50 MG: 25 CAPSULE ORAL at 21:02

## 2025-03-10 RX ADMIN — ZONISAMIDE 50 MG: 25 CAPSULE ORAL at 16:51

## 2025-03-10 RX ADMIN — GABAPENTIN 200 MG: 250 SOLUTION ORAL at 20:33

## 2025-03-10 RX ADMIN — DILTIAZEM HYDROCHLORIDE 60 MG: 60 TABLET ORAL at 20:33

## 2025-03-10 RX ADMIN — LEVETIRACETAM 750 MG: 500 TABLET, FILM COATED ORAL at 09:01

## 2025-03-10 RX ADMIN — METOPROLOL TARTRATE 50 MG: 50 TABLET, FILM COATED ORAL at 09:01

## 2025-03-10 RX ADMIN — DILTIAZEM HYDROCHLORIDE 60 MG: 60 TABLET ORAL at 16:51

## 2025-03-10 RX ADMIN — SODIUM CHLORIDE, PRESERVATIVE FREE 10 ML: 5 INJECTION INTRAVENOUS at 21:02

## 2025-03-10 RX ADMIN — Medication 5000 UNITS: at 09:01

## 2025-03-10 RX ADMIN — ALBUTEROL SULFATE 2.5 MG: 2.5 SOLUTION RESPIRATORY (INHALATION) at 15:13

## 2025-03-10 RX ADMIN — GABAPENTIN 200 MG: 250 SOLUTION ORAL at 16:51

## 2025-03-10 RX ADMIN — ALLOPURINOL 300 MG: 300 TABLET ORAL at 09:01

## 2025-03-10 RX ADMIN — METOCLOPRAMIDE 5 MG: 5 INJECTION, SOLUTION INTRAMUSCULAR; INTRAVENOUS at 16:50

## 2025-03-10 RX ADMIN — ALBUTEROL SULFATE 2.5 MG: 2.5 SOLUTION RESPIRATORY (INHALATION) at 11:43

## 2025-03-10 RX ADMIN — METOPROLOL TARTRATE 50 MG: 50 TABLET, FILM COATED ORAL at 20:57

## 2025-03-10 RX ADMIN — METOCLOPRAMIDE 5 MG: 5 INJECTION, SOLUTION INTRAMUSCULAR; INTRAVENOUS at 01:27

## 2025-03-10 RX ADMIN — PANTOPRAZOLE SODIUM 40 MG: 40 INJECTION, POWDER, LYOPHILIZED, FOR SOLUTION INTRAVENOUS at 09:01

## 2025-03-10 RX ADMIN — LEVETIRACETAM 750 MG: 500 TABLET, FILM COATED ORAL at 20:33

## 2025-03-10 RX ADMIN — METOCLOPRAMIDE 5 MG: 5 INJECTION, SOLUTION INTRAMUSCULAR; INTRAVENOUS at 09:01

## 2025-03-10 RX ADMIN — GABAPENTIN 200 MG: 250 SOLUTION ORAL at 09:00

## 2025-03-10 RX ADMIN — Medication 15 ML: at 09:00

## 2025-03-10 RX ADMIN — DULOXETINE 60 MG: 60 CAPSULE, DELAYED RELEASE ORAL at 09:01

## 2025-03-10 RX ADMIN — HALOPERIDOL LACTATE 5 MG: 5 INJECTION, SOLUTION INTRAMUSCULAR at 00:37

## 2025-03-10 RX ADMIN — ALBUTEROL SULFATE 2.5 MG: 2.5 SOLUTION RESPIRATORY (INHALATION) at 06:46

## 2025-03-10 RX ADMIN — ALBUTEROL SULFATE 2.5 MG: 2.5 SOLUTION RESPIRATORY (INHALATION) at 19:26

## 2025-03-10 RX ADMIN — OXYCODONE 5 MG: 5 TABLET ORAL at 01:41

## 2025-03-10 ASSESSMENT — PAIN SCALES - GENERAL
PAINLEVEL_OUTOF10: 0
PAINLEVEL_OUTOF10: 0

## 2025-03-10 NOTE — PROGRESS NOTES
RT Inhaler-Nebulizer Bronchodilator Protocol Note    There is a bronchodilator order in the chart from a provider indicating to follow the RT Bronchodilator Protocol and there is an “Initiate RT Inhaler-Nebulizer Bronchodilator Protocol” order as well (see protocol at bottom of note).    CXR Findings:  No results found.    The findings from the last RT Protocol Assessment were as follows:   History Pulmonary Disease: Chronic pulmonary disease  Respiratory Pattern: Dyspnea on exertion or RR 21-25 bpm  Breath Sounds: Inspiratory and expiratory or bilateral wheezing and/or rhonchi  Cough: Strong, productive  Indication for Bronchodilator Therapy: Decreased or absent breath sounds  Bronchodilator Assessment Score: 11    Aerosolized bronchodilator medication orders have been revised according to the RT Inhaler-Nebulizer Bronchodilator Protocol below.    Respiratory Therapist to perform RT Therapy Protocol Assessment initially then follow the protocol.  Repeat RT Therapy Protocol Assessment PRN for score 0-3 or on second treatment, BID, and PRN for scores above 3.    No Indications - adjust the frequency to every 6 hours PRN wheezing or bronchospasm, if no treatments needed after 48 hours then discontinue using Per Protocol order mode.     If indication present, adjust the RT bronchodilator orders based on the Bronchodilator Assessment Score as indicated below.  Use Inhaler orders unless patient has one or more of the following: on home nebulizer, not able to hold breath for 10 seconds, is not alert and oriented, cannot activate and use MDI correctly, or respiratory rate 25 breaths per minute or more, then use the equivalent nebulizer order(s) with same Frequency and PRN reasons based on the score.  If a patient is on this medication at home then do not decrease Frequency below that used at home.    0-3 - enter or revise RT bronchodilator order(s) to equivalent RT Bronchodilator order with Frequency of every 4 hours PRN

## 2025-03-10 NOTE — PROGRESS NOTES
4 Eyes Skin Assessment     NAME:  Sandor Early  YOB: 1955  MEDICAL RECORD NUMBER:  [id number]    The patient is being assessed for  Shift Handoff    I agree that at least one RN has performed a thorough Head to Toe Skin Assessment on the patient. ALL assessment sites listed below have been assessed.      Areas assessed by both nurses:    Head, Face, Ears, Shoulders, Back, Chest, Arms, Elbows, Hands, Sacrum. Buttock, Coccyx, Ischium, Legs. Feet and Heels, and Under Medical Devices         Does the Patient have a Wound? Yes wound(s) were present on assessment. LDA wound assessment was Initiated and completed by RN       Abraham Prevention initiated by RN: Yes  Wound Care Orders initiated by RN: Yes    Pressure Injury (Stage 3,4, Unstageable, DTI, NWPT, and Complex wounds) if present, place Wound referral order by RN under : No    New Ostomies, if present place, Ostomy referral order under : No     Nurse 1 eSignature: Electronically signed by Lois Baker RN on 3/10/25 at 2:52 AM EDT    **SHARE this note so that the co-signing nurse can place an eSignature**    Nurse 2 eSignature: Electronically signed by Conrad Gage RN on 3/10/25 at 3:27 AM EDT     Patient is not able to demonstrated the ability to move from a reclining position to an upright position within the recliner. Patient is confused, demented and /or unable to follow instruction.

## 2025-03-10 NOTE — PROGRESS NOTES
Physical Therapy/Occupational Therapy    Chart reviewed and planned to see patient this afternoon. Spoke with RN who reports pt's medical status has been fluctuating as they are changing medications and pt is also current in restraints and sleeping, requested that therapy hold this date and check back at later time. Will continue to follow and treat as able.     Presley Ramos, PT, DPT  Reyna Andre, OTR/L

## 2025-03-10 NOTE — PROGRESS NOTES
Pt remains on 8 Lt High flow oxygen, BiPAP at night time and PRN,  Setting 15/5 RR 18 FiO2 45%. N/G tube in Left nares, bridled at 64 cm. Glucerna 1.5 infusing at 60 ml/hr water flushes 120 ml Q 3 H. 2 PIV's in place.  Castrejon in place.  Pt calm tonight.  Assessment complete as charted.  Repositioned for comfort.  Call light in reach. Denies needs.  Will continue to monitor.

## 2025-03-10 NOTE — PROGRESS NOTES
Neurology review of meds:    Patient remains restless and agitated no recent seizures.    1- Will resume zonisamide 50 mg twice a day to be increased later (lower risk of possible behavioral issues).    2-Plan to taper off Keppra once zonisamide is fully therapeutic.    3-- Increase gabapentin to 200 mg 4 times a day.    [facility]  Neurology

## 2025-03-10 NOTE — PLAN OF CARE
Problem: Respiratory - Adult  Goal: Achieves optimal ventilation and oxygenation  3/9/2025 1911 by Manuel Evans RN  Outcome: Progressing     Problem: Chronic Conditions and Co-morbidities  Goal: Patient's chronic conditions and co-morbidity symptoms are monitored and maintained or improved  3/10/2025 0247 by Lois Baker RN  Outcome: Progressing  Flowsheets (Taken 3/10/2025 0247)  Care Plan - Patient's Chronic Conditions and Co-Morbidity Symptoms are Monitored and Maintained or Improved: Monitor and assess patient's chronic conditions and comorbid symptoms for stability, deterioration, or improvement  3/9/2025 1911 by Manuel Evans RN  Outcome: Progressing     Problem: Discharge Planning  Goal: Discharge to home or other facility with appropriate resources  3/10/2025 0247 by Lois Baker RN  Outcome: Progressing  Flowsheets (Taken 3/10/2025 0247)  Discharge to home or other facility with appropriate resources:   Identify barriers to discharge with patient and caregiver   Arrange for needed discharge resources and transportation as appropriate  3/9/2025 1911 by Manuel Evans RN  Outcome: Progressing     Problem: Safety - Adult  Goal: Free from fall injury  3/10/2025 0247 by Lois Baker RN  Outcome: Progressing  Flowsheets (Taken 3/10/2025 0247)  Free From Fall Injury: Instruct family/caregiver on patient safety  3/9/2025 1911 by Manuel Evans RN  Outcome: Progressing     Problem: Pain  Goal: Verbalizes/displays adequate comfort level or baseline comfort level  3/10/2025 0247 by Lois Baker RN  Outcome: Progressing  Flowsheets (Taken 3/10/2025 0247)  Verbalizes/displays adequate comfort level or baseline comfort level:   Encourage patient to monitor pain and request assistance   Assess pain using appropriate pain scale  3/9/2025 1911 by Manuel Evans RN  Outcome: Progressing     Problem: Respiratory - Adult  Goal: Achieves optimal ventilation and oxygenation  3/10/2025 0247 by Lois Baker  Juliet Mayorga MD   albuterol sulfate HFA (PROVENTIL;VENTOLIN;PROAIR) 108 (90 Base) MCG/ACT inhaler INHALE TWO (2) PUFFS EVERY SIX (6) HOURS AS NEEDED FOR WHEEZING 1/2/25  Yes Juliet Mayorga MD   DULoxetine (CYMBALTA) 60 MG extended release capsule TAKE ONE CAPSULE BY MOUTH EVERY DAY 12/9/24  Yes Cooper Martinez MD   Alcohol Swabs PADS USE AS DIRECTED TO TEST AND INSULIN 12/4/24  Yes Cooper Martinez MD   oxyCODONE HCl (OXY-IR) 10 MG immediate release tablet Take 1 tablet by mouth every 6 hours as needed for Pain.   Yes Navjot East MD   calcium carbonate (TUMS) 500 MG chewable tablet Take 1 tablet by mouth 3 times daily as needed for Heartburn 10/25/24  Yes Shelby Hand MD   insulin glargine (LANTUS SOLOSTAR) 100 UNIT/ML injection pen Inject 15 Units into the skin nightly INJECT 15 UNITS INTO THE SKIN DAILY WITH SUPPER 10/25/24  Yes Shelby Hand MD   FEROSUL 325 (65 Fe) MG tablet TAKE ONE (1) TABLET BY MOUTH DAILY WITH BREAKFAST 10/9/24  Yes Cooper Martinez MD   folic acid (FOLVITE) 1 MG tablet TAKE ONE (1) TABLET BY MOUTH DAILY 9/10/24  Yes Cooper Martienz MD   furosemide (LASIX) 20 MG tablet TAKE ONE (1) TABLET BY MOUTH DAILY 8/23/24  Yes Cooper Martinez MD   TRUE METRIX BLOOD GLUCOSE TEST strip USE TWICE DAILY 8/8/24  Yes Cooper Martinez MD   Cholecalciferol (VITAMIN D3) 125 MCG (5000 UT) CAPS TAKE ONE (1) CAPSULE BY MOUTH DAILY 7/15/24  Yes Cooper Martinez MD   fluticasone-umeclidin-vilant (TRELEGY ELLIPTA) 100-62.5-25 MCG/ACT AEPB inhaler INHALE ONE (1) PUFF INTO THE LUNGS IN THE MORNING. 7/1/24  Yes Cooper Martinez MD   UNIFINE PENTIPS 31G X 8 MM MISC USE ONCE DAILY WITH INJECTION 5/22/24  Yes Cooper Martinez MD   ondansetron (ZOFRAN) 4 MG tablet TAKE ONE TABLET BY MOUTH EVERY SIX (6) HOURS AS NEEDED FOR NAUSEA OR VOMITING 5/9/24  Yes Cooper Martinez MD   allopurinol (ZYLOPRIM) 300

## 2025-03-10 NOTE — PROGRESS NOTES
Speech Language Pathology  Attempt Note     Name: Sandor Early  : 1955  Medical Diagnosis: Hypomagnesemia [E83.42]  Influenza A [J10.1]  COPD exacerbation (HCC) [J44.1]  Respiratory failure with hypoxia (HCC) [J96.91]  Alcohol use disorder [F10.90]  Acute on chronic respiratory failure with hypoxia and hypercapnia (HCC) [J96.21, J96.22]  Acute respiratory failure (HCC) [J96.00]      SLP attempted to see pt for dysphagia tx. SLP spoke with Manuel/RN. Treatment unable to be completed due to agitation. Pt not able to  manage secretions at this time. SLP to re-attempt as pt's condition and schedule allows. RN aware. No charges.    Thank you,    Tayla Shelby M.A. SLP  Speech-Language Pathologist  [place] Lic #[id number]  [phone]

## 2025-03-10 NOTE — PROGRESS NOTES
Family and daughter Catia at bedside. Update given at this time. Discussed with Catia concerns for possible reintubation or need for trach. Also asked her wishes for code status and care after hospital. Daughter stated she wants her father to be a full code. If he needs reintubation she also wants this done. She stated if he needs a trach, she wants that to be completed. She stated he would need long term care after hospitalization due to no one will be able to meet his needs once he is discharged even if he does not have a trach.Case management at bedside also discussing nursing home options. Will continue with currently poc for pt.

## 2025-03-10 NOTE — CARE COORDINATION
Spoke with Manuel MEEKS. Family has questions regarding discharge planning.    Met with daughter Catia and other family members at bedside. The family is deciding if they want the patient to have a trach and go to LTC.    CM requested benefits to be verified. The patient does have LTC coverage. Provided the family with the four local facilities that can take trach or trach/vent patients.     [facility]  [facility]    The daughter states [facility] would be closer for her. CM provided contact information for [facility] and advised Catia to contact [facility] if she would like to take a tour. Catia to let CM know what facility they would like.     Catia advised she would like [facility] because it is the closest for her. Family in room also in agreement. Following.     [facility] aware and following.

## 2025-03-10 NOTE — PROGRESS NOTES
No change in assessment at this time. Continue with oral care as needed. Pt has large amount of thick secretions. Cough is stronger but not able to cough up secretions.

## 2025-03-10 NOTE — PROGRESS NOTES
03/10/25 0212   NIV Type   NIV Started/Stopped (S)  Off   Assessment   Pulse (!) 102   Respirations 21   SpO2 92 %

## 2025-03-10 NOTE — PROGRESS NOTES
Pt had large bowel movement. Skin dry and intact. Reposition as needed. Attempted to take restraints off, pt attempted to pull out NGT> Telesitter still in room.

## 2025-03-10 NOTE — PROGRESS NOTES
Pulmonary & Critical Care Medicine ICU Progress Note    CC: COPD respiratory failure    Events of Last 24 hours:  BiPAP 15/5 45% 10 pm - 2 am   In restrains continues to pull things off   No drips   7L    Vascular lines: IV: Left IJ 2/14-2/26. R Midline 2/25    MV: 2/14-3/4/25  Vent Mode: AC/VC Resp Rate (Set): 0 bpm/Vt (Set, mL): 0 mL/ /FiO2 : 45 %  Recent Labs     03/08/25  2135   PHART 7.411   [id number] 55.0*   PO2ART 59.0*         IV:   sodium chloride      sodium chloride      dextrose         Vitals:  Blood pressure (!) 158/65, pulse 99, temperature 97.6 °F (36.4 °C), temperature source Bladder, resp. rate 16, height 1.702 m (5' 7.01\"), weight 79.7 kg (175 lb 11.3 oz), SpO2 97%.  on 7L    EXAM:  General: ill appearing.    Eyes: No sclera icterus. No conjunctival injection.  ENT: No discharge.   Neck: Trachea midline.   Resp: No accessory muscle use. Few crackles. No wheezing. + rhonchi.   CV: Regular  rate. Regular rhythm. No mumur or rub. + LE edema.   GI: Non-tender. Non-distended.   Skin: Warm and dry. No rash on exposed extremities.  M/S: No cyanosis. No clubbing.   Neuro: intermittently following commands.  Good cough.   Psych: mild agiation. Alert to name        Scheduled Meds:   levETIRAcetam  750 mg Oral BID    metoclopramide  5 mg IntraVENous Q6H    cloNIDine  1 patch TransDERmal Weekly    CENTRUM/CERTA-KAYKAY with minerals oral  15 mL Oral Daily    dilTIAZem  60 mg Oral 3 times per day    QUEtiapine  25 mg Oral BID    metoprolol tartrate  50 mg Oral BID    polyethylene glycol  17 g Oral Daily    Gabapentin  200 mg Per NG tube BID    sodium chloride flush  5-40 mL IntraVENous 2 times per day    insulin lispro  0-16 Units SubCUTAneous Q4H    albuterol  2.5 mg Nebulization 4x Daily RT    sodium chloride flush  5-40 mL IntraVENous 2 times per day    pantoprazole (PROTONIX) 40 mg in sodium chloride (PF) 0.9 % 10 mL injection  40 mg IntraVENous Daily    enoxaparin  40 mg SubCUTAneous Daily    allopurinol  300

## 2025-03-10 NOTE — PROGRESS NOTES
Progress Note    Admit Date:  1/30/2025    Interval history    Admitted for flu, copd exacerbation, course complicated with development of sbo, resp failure needing vent support     Tolerated pressure support 5/5 for 6 hours yesterday.  Plans for spontaneous breathing trial and possible extubation today.  On Precedex.    3/5-extubated on 3/4/2025.  Placed on BiPAP.  Has been intermittently confused.  Was on Precedex.  Has had some intermittent agitation.  When I saw the patient his eyes are open.   at bedside.  He has a good cough.    3/6- more calm then yesterday. On Bipap. Failed speech eval but they will see him again. Responding nicely to verbal commands,    3/7- more calm having thick secretions. ST has recommended MBS. He is NPO. Used Bipap last night. BP elevated today.    3/9- MR singer seen awake, but confused, restless was given seroquel for agitation. Confused and not directable only for few minutes and gets restless, throwing stuff  Was on precedex overnight but now off    3/10  Continues to be disoriented   Recd haldol yesterday  Now in restraints  Used BiPAP at night   Now on 5 L       Objective:   Patient Vitals for the past 4 hrs:   BP Temp Temp src Pulse Resp SpO2 Weight   03/10/25 0700 (!) 158/65 96.9 °F (36.1 °C) Temporal 99 16 97 % --   03/10/25 0656 (!) 153/66 -- -- -- -- -- --   03/10/25 0646 -- -- -- (!) 108 20 95 % --   03/10/25 0600 (!) 153/66 -- -- 100 16 99 % --   03/10/25 0500 (!) 149/67 -- -- 90 15 97 % --   03/10/25 0400 (!) 150/64 97.6 °F (36.4 °C) Bladder 100 21 100 % 79.7 kg (175 lb 11.3 oz)          Intake/Output Summary (Last 24 hours) at 3/10/2025 0747  Last data filed at 3/10/2025 0427  Gross per 24 hour   Intake 1999.02 ml   Output 450 ml   Net 1549.02 ml       Physical Exam:  General: elderly male ill appearing , confused in bed  awake  Mucous Membranes:  Pink , anicteric  Neck: No JVD, no carotid bruit, no thyromegaly  NG tube noted   Chest diminished in bases   03/09/25  0516 03/10/25  0450   WBC 10.9 9.7 10.5   HGB 9.8* 10.3* 9.1*   HCT 30.6* 32.1* 30.9*   MCV 90.9 91.1 96.8   * 480* 395     BMP:   Recent Labs     03/08/25  0510 03/09/25  0516 03/10/25  0450   * 138 141   K 3.5 3.9 3.8    100 102   CO2 31 32 31   PHOS  --  3.4  --    BUN 19 15 12   CREATININE 0.6* 0.5* 0.6*     LIVER PROFILE:   Recent Labs     03/07/25  0655 03/09/25  0516   AST 23  38* 22   ALT 20  18 14   BILIDIR 0.2 <0.1   BILITOT 0.4  0.3 0.3   ALKPHOS 190*  208* 177*     PT/INR: No results for input(s): \"PROTIME\", \"INR\" in the last 72 hours.    CULTURES  Results       ** No results found for the last 336 hours. **               RADIOLOGY  XR ABDOMEN FOR NG/OG/NE TUBE PLACEMENT   Final Result   Enteric tube tip and side port within the stomach.         XR CHEST PORTABLE   Final Result   Slightly improved perihilar opacities centrally.         XR CHEST PORTABLE   Final Result   1. Slight interval worsening of a right base opacity.   2. Endotracheal tube and nasogastric tube in place.         XR CHEST PORTABLE   Final Result   1. Stable lines and tubes.   2. Stable bilateral airspace opacities.         XR CHEST PORTABLE   Final Result   Stable appearance of the chest and support apparatus.         XR CHEST PORTABLE   Final Result   No evidence of acute cardiopulmonary process.  Stable positioning of support   apparatus.         Vascular duplex upper extremity venous bilateral   Final Result      XR CHEST PORTABLE   Final Result   Minimal vascular congestion with a small left pleural effusion.         XR CHEST PORTABLE   Final Result   No evidence of acute cardiopulmonary process.         XR CHEST PORTABLE   Final Result   No significant finding in the chest.         XR ABDOMEN FOR NG/OG/NE TUBE PLACEMENT   Final Result   Nasogastric tube with tip overlying gastric antrum.         XR CHEST PORTABLE   Final Result   1.  Lines and tubes as above.  The NG side port is near the GE

## 2025-03-11 LAB
AMMONIA PLAS-SCNC: 51 UMOL/L (ref 16–60)
ANION GAP SERPL CALCULATED.3IONS-SCNC: 5 MMOL/L (ref 3–16)
ANION GAP SERPL CALCULATED.3IONS-SCNC: 5 MMOL/L (ref 3–16)
ANISOCYTOSIS BLD QL SMEAR: ABNORMAL
BASE EXCESS BLDV CALC-SCNC: 12 MMOL/L (ref -3–3)
BASOPHILS # BLD: 0 K/UL (ref 0–0.2)
BASOPHILS NFR BLD: 0 %
BUN SERPL-MCNC: 11 MG/DL (ref 7–20)
BUN SERPL-MCNC: 11 MG/DL (ref 7–20)
CALCIUM SERPL-MCNC: 8.6 MG/DL (ref 8.3–10.6)
CALCIUM SERPL-MCNC: 9.5 MG/DL (ref 8.3–10.6)
CHLORIDE SERPL-SCNC: 97 MMOL/L (ref 99–110)
CHLORIDE SERPL-SCNC: 98 MMOL/L (ref 99–110)
CO2 BLDV-SCNC: 39 MMOL/L
CO2 SERPL-SCNC: 36 MMOL/L (ref 21–32)
CO2 SERPL-SCNC: 36 MMOL/L (ref 21–32)
COHGB MFR BLDV: 1.7 % (ref 0–1.5)
CREAT SERPL-MCNC: 0.5 MG/DL (ref 0.8–1.3)
CREAT SERPL-MCNC: 0.5 MG/DL (ref 0.8–1.3)
DACRYOCYTES BLD QL SMEAR: ABNORMAL
DEPRECATED RDW RBC AUTO: 17.3 % (ref 12.4–15.4)
EOSINOPHIL # BLD: 0.7 K/UL (ref 0–0.6)
EOSINOPHIL NFR BLD: 7 %
GFR SERPLBLD CREATININE-BSD FMLA CKD-EPI: >90 ML/MIN/{1.73_M2}
GFR SERPLBLD CREATININE-BSD FMLA CKD-EPI: >90 ML/MIN/{1.73_M2}
GLUCOSE BLD-MCNC: 127 MG/DL (ref 70–99)
GLUCOSE BLD-MCNC: 130 MG/DL (ref 70–99)
GLUCOSE BLD-MCNC: 132 MG/DL (ref 70–99)
GLUCOSE BLD-MCNC: 134 MG/DL (ref 70–99)
GLUCOSE BLD-MCNC: 148 MG/DL (ref 70–99)
GLUCOSE SERPL-MCNC: 129 MG/DL (ref 70–99)
GLUCOSE SERPL-MCNC: 139 MG/DL (ref 70–99)
HCO3 BLDV-SCNC: 37 MMOL/L (ref 23–29)
HCT VFR BLD AUTO: 28.8 % (ref 40.5–52.5)
HGB BLD-MCNC: 9.5 G/DL (ref 13.5–17.5)
HYPOCHROMIA BLD QL SMEAR: ABNORMAL
LYMPHOCYTES # BLD: 1.5 K/UL (ref 1–5.1)
LYMPHOCYTES NFR BLD: 12 %
MCH RBC QN AUTO: 29 PG (ref 26–34)
MCHC RBC AUTO-ENTMCNC: 32.8 G/DL (ref 31–36)
MCV RBC AUTO: 88.4 FL (ref 80–100)
METHGB MFR BLDV: 0.3 %
MONOCYTES # BLD: 0.8 K/UL (ref 0–1.3)
MONOCYTES NFR BLD: 8 %
NEUTROPHILS # BLD: 6.5 K/UL (ref 1.7–7.7)
NEUTROPHILS NFR BLD: 69 %
O2 CT VFR BLDV CALC: 14 VOL %
O2 THERAPY: ABNORMAL
PCO2 BLDV: 50.8 MMHG (ref 40–50)
PERFORMED ON: ABNORMAL
PH BLDV: 7.48 [PH] (ref 7.35–7.45)
PLATELET # BLD AUTO: 431 K/UL (ref 135–450)
PLATELET BLD QL SMEAR: ABNORMAL
PMV BLD AUTO: 7.4 FL (ref 5–10.5)
PO2 BLDV: 175 MMHG (ref 25–40)
POIKILOCYTOSIS BLD QL SMEAR: ABNORMAL
POLYCHROMASIA BLD QL SMEAR: ABNORMAL
POTASSIUM SERPL-SCNC: 4 MMOL/L (ref 3.5–5.1)
POTASSIUM SERPL-SCNC: 4 MMOL/L (ref 3.5–5.1)
RBC # BLD AUTO: 3.26 M/UL (ref 4.2–5.9)
SAO2 % BLDV: 99 %
SLIDE REVIEW: ABNORMAL
SODIUM SERPL-SCNC: 138 MMOL/L (ref 136–145)
SODIUM SERPL-SCNC: 139 MMOL/L (ref 136–145)
STOMATOCYTES BLD QL SMEAR: ABNORMAL
VARIANT LYMPHS NFR BLD MANUAL: 4 % (ref 0–6)
WBC # BLD AUTO: 9.4 K/UL (ref 4–11)

## 2025-03-11 PROCEDURE — 82140 ASSAY OF AMMONIA: CPT

## 2025-03-11 PROCEDURE — 6370000000 HC RX 637 (ALT 250 FOR IP): Performed by: NEUROMUSCULOSKELETAL MEDICINE & OMM

## 2025-03-11 PROCEDURE — 85025 COMPLETE CBC W/AUTO DIFF WBC: CPT

## 2025-03-11 PROCEDURE — 2500000003 HC RX 250 WO HCPCS: Performed by: INTERNAL MEDICINE

## 2025-03-11 PROCEDURE — 6360000002 HC RX W HCPCS: Performed by: INTERNAL MEDICINE

## 2025-03-11 PROCEDURE — 82803 BLOOD GASES ANY COMBINATION: CPT

## 2025-03-11 PROCEDURE — 2700000000 HC OXYGEN THERAPY PER DAY

## 2025-03-11 PROCEDURE — 99233 SBSQ HOSP IP/OBS HIGH 50: CPT | Performed by: INTERNAL MEDICINE

## 2025-03-11 PROCEDURE — 2580000003 HC RX 258: Performed by: INTERNAL MEDICINE

## 2025-03-11 PROCEDURE — 94669 MECHANICAL CHEST WALL OSCILL: CPT

## 2025-03-11 PROCEDURE — 94660 CPAP INITIATION&MGMT: CPT

## 2025-03-11 PROCEDURE — 97530 THERAPEUTIC ACTIVITIES: CPT

## 2025-03-11 PROCEDURE — 6370000000 HC RX 637 (ALT 250 FOR IP): Performed by: INTERNAL MEDICINE

## 2025-03-11 PROCEDURE — 80048 BASIC METABOLIC PNL TOTAL CA: CPT

## 2025-03-11 PROCEDURE — 2000000000 HC ICU R&B

## 2025-03-11 PROCEDURE — 76937 US GUIDE VASCULAR ACCESS: CPT

## 2025-03-11 PROCEDURE — 94640 AIRWAY INHALATION TREATMENT: CPT

## 2025-03-11 PROCEDURE — 6370000000 HC RX 637 (ALT 250 FOR IP): Performed by: STUDENT IN AN ORGANIZED HEALTH CARE EDUCATION/TRAINING PROGRAM

## 2025-03-11 PROCEDURE — 94761 N-INVAS EAR/PLS OXIMETRY MLT: CPT

## 2025-03-11 PROCEDURE — 97535 SELF CARE MNGMENT TRAINING: CPT

## 2025-03-11 PROCEDURE — 99232 SBSQ HOSP IP/OBS MODERATE 35: CPT | Performed by: INTERNAL MEDICINE

## 2025-03-11 PROCEDURE — 36415 COLL VENOUS BLD VENIPUNCTURE: CPT

## 2025-03-11 RX ORDER — LEVETIRACETAM 100 MG/ML
750 SOLUTION ORAL 2 TIMES DAILY
Status: DISCONTINUED | OUTPATIENT
Start: 2025-03-11 | End: 2025-03-11

## 2025-03-11 RX ORDER — LEVETIRACETAM 100 MG/ML
750 SOLUTION ORAL 2 TIMES DAILY
Status: DISCONTINUED | OUTPATIENT
Start: 2025-03-11 | End: 2025-03-13

## 2025-03-11 RX ORDER — LACTULOSE 10 G/15ML
30 SOLUTION ORAL 3 TIMES DAILY
Status: DISCONTINUED | OUTPATIENT
Start: 2025-03-11 | End: 2025-03-15

## 2025-03-11 RX ADMIN — ENOXAPARIN SODIUM 40 MG: 100 INJECTION SUBCUTANEOUS at 09:46

## 2025-03-11 RX ADMIN — ATORVASTATIN CALCIUM 40 MG: 40 TABLET, FILM COATED ORAL at 09:47

## 2025-03-11 RX ADMIN — Medication 750 MG: at 11:36

## 2025-03-11 RX ADMIN — ALBUTEROL SULFATE 2.5 MG: 2.5 SOLUTION RESPIRATORY (INHALATION) at 15:13

## 2025-03-11 RX ADMIN — GABAPENTIN 200 MG: 250 SOLUTION ORAL at 15:06

## 2025-03-11 RX ADMIN — RIFAXIMIN 550 MG: 550 TABLET ORAL at 21:32

## 2025-03-11 RX ADMIN — GABAPENTIN 200 MG: 250 SOLUTION ORAL at 02:34

## 2025-03-11 RX ADMIN — DILTIAZEM HYDROCHLORIDE 60 MG: 60 TABLET ORAL at 21:34

## 2025-03-11 RX ADMIN — LACTULOSE 30 G: 10 SOLUTION ORAL at 12:21

## 2025-03-11 RX ADMIN — METOPROLOL TARTRATE 50 MG: 50 TABLET, FILM COATED ORAL at 21:34

## 2025-03-11 RX ADMIN — PANTOPRAZOLE SODIUM 40 MG: 40 INJECTION, POWDER, LYOPHILIZED, FOR SOLUTION INTRAVENOUS at 09:47

## 2025-03-11 RX ADMIN — CARBOXYMETHYLCELLULOSE SODIUM 1 DROP: 10 GEL OPHTHALMIC at 22:40

## 2025-03-11 RX ADMIN — ALBUTEROL SULFATE 2.5 MG: 2.5 SOLUTION RESPIRATORY (INHALATION) at 19:21

## 2025-03-11 RX ADMIN — Medication 5000 UNITS: at 09:47

## 2025-03-11 RX ADMIN — Medication 15 ML: at 11:36

## 2025-03-11 RX ADMIN — QUETIAPINE FUMARATE 25 MG: 25 TABLET ORAL at 09:47

## 2025-03-11 RX ADMIN — GABAPENTIN 200 MG: 250 SOLUTION ORAL at 11:36

## 2025-03-11 RX ADMIN — GABAPENTIN 200 MG: 250 SOLUTION ORAL at 21:33

## 2025-03-11 RX ADMIN — METOCLOPRAMIDE 5 MG: 5 INJECTION, SOLUTION INTRAMUSCULAR; INTRAVENOUS at 09:48

## 2025-03-11 RX ADMIN — DILTIAZEM HYDROCHLORIDE 60 MG: 60 TABLET ORAL at 06:14

## 2025-03-11 RX ADMIN — ALBUTEROL SULFATE 2.5 MG: 2.5 SOLUTION RESPIRATORY (INHALATION) at 08:39

## 2025-03-11 RX ADMIN — METOCLOPRAMIDE 5 MG: 5 INJECTION, SOLUTION INTRAMUSCULAR; INTRAVENOUS at 02:34

## 2025-03-11 RX ADMIN — ALLOPURINOL 300 MG: 300 TABLET ORAL at 09:47

## 2025-03-11 RX ADMIN — DILTIAZEM HYDROCHLORIDE 60 MG: 60 TABLET ORAL at 15:06

## 2025-03-11 RX ADMIN — LACTULOSE 30 G: 10 SOLUTION ORAL at 21:33

## 2025-03-11 RX ADMIN — RIFAXIMIN 550 MG: 550 TABLET ORAL at 12:21

## 2025-03-11 RX ADMIN — DULOXETINE HYDROCHLORIDE 30 MG: 30 CAPSULE, DELAYED RELEASE ORAL at 09:47

## 2025-03-11 RX ADMIN — SODIUM CHLORIDE, PRESERVATIVE FREE 10 ML: 5 INJECTION INTRAVENOUS at 22:42

## 2025-03-11 RX ADMIN — METOPROLOL TARTRATE 50 MG: 50 TABLET, FILM COATED ORAL at 09:47

## 2025-03-11 RX ADMIN — ZONISAMIDE 50 MG: 25 CAPSULE ORAL at 11:37

## 2025-03-11 RX ADMIN — ZONISAMIDE 50 MG: 25 CAPSULE ORAL at 21:39

## 2025-03-11 RX ADMIN — QUETIAPINE FUMARATE 25 MG: 25 TABLET ORAL at 21:33

## 2025-03-11 RX ADMIN — ALBUTEROL SULFATE 2.5 MG: 2.5 SOLUTION RESPIRATORY (INHALATION) at 11:40

## 2025-03-11 RX ADMIN — Medication 750 MG: at 21:39

## 2025-03-11 ASSESSMENT — PAIN SCALES - GENERAL
PAINLEVEL_OUTOF10: 0

## 2025-03-11 NOTE — PROGRESS NOTES
Speech Language Pathology  Attempt Note     Name: Sandor Early  : 1955  Medical Diagnosis: Hypomagnesemia [E83.42]  Influenza A [J10.1]  COPD exacerbation (HCC) [J44.1]  Respiratory failure with hypoxia (HCC) [J96.91]  Alcohol use disorder [F10.90]  Acute on chronic respiratory failure with hypoxia and hypercapnia (HCC) [J96.21, J96.22]  Acute respiratory failure (HCC) [J96.00]      SLP attempted to see pt for dysphagia tx. SLP spoke with Rissa/CONSTANTINO who reports pt continues to have difficulty managing secretions.  Pt not appropriate for po trials this day. SLP to re-attempt as pt's condition and schedule allows. RN aware. No charges.    Thank you,  Tayla Shelby M.A. SLP  Speech-Language Pathologist  [place] Lic #[id number]  [phone]

## 2025-03-11 NOTE — PROGRESS NOTES
Pulmonary & Critical Care Medicine ICU Progress Note    CC: COPD respiratory failure    Events of Last 24 hours:  BiPAP 15/5 45% 10 pm - 2 am   No drips   7---> 5 L  More coherent       Vascular lines: IV: Left IJ 2/14-2/26. R Midline 2/25    MV: 2/14-3/4/25  Vent Mode: AC/VC Resp Rate (Set): 0 bpm/Vt (Set, mL): 0 mL/ /FiO2 : 45 %  Recent Labs     03/08/25  2135   PHART 7.411   [id number] 55.0*   PO2ART 59.0*         IV:   sodium chloride      sodium chloride      dextrose         Vitals:  Blood pressure 139/73, pulse 98, temperature 98.2 °F (36.8 °C), temperature source Axillary, resp. rate 19, height 1.702 m (5' 7.01\"), weight 79.7 kg (175 lb 11.3 oz), SpO2 100%.  on 7L    EXAM:  General: ill appearing.    Eyes: No sclera icterus. No conjunctival injection.  ENT: No discharge.   Neck: Trachea midline.   Resp: No accessory muscle use. Few crackles. No wheezing. + rhonchi.   CV: Regular  rate. Regular rhythm. No mumur or rub. No edema.   GI: Non-tender. Non-distended.   Skin: Warm and dry. No rash on exposed extremities.  M/S: No cyanosis. No clubbing.   Neuro: + following commands.  Good cough.   Psych: No agiation. Alert to name        Scheduled Meds:   zonisamide  50 mg Oral BID    Gabapentin  200 mg Per NG tube Q6H    DULoxetine  30 mg Oral Daily    levETIRAcetam  750 mg Oral BID    metoclopramide  5 mg IntraVENous Q6H    cloNIDine  1 patch TransDERmal Weekly    CENTRUM/CERTA-KAYKAY with minerals oral  15 mL Oral Daily    dilTIAZem  60 mg Oral 3 times per day    QUEtiapine  25 mg Oral BID    metoprolol tartrate  50 mg Oral BID    polyethylene glycol  17 g Oral Daily    sodium chloride flush  5-40 mL IntraVENous 2 times per day    insulin lispro  0-16 Units SubCUTAneous Q4H    albuterol  2.5 mg Nebulization 4x Daily RT    sodium chloride flush  5-40 mL IntraVENous 2 times per day    pantoprazole (PROTONIX) 40 mg in sodium chloride (PF) 0.9 % 10 mL injection  40 mg IntraVENous Daily    enoxaparin  40 mg SubCUTAneous

## 2025-03-11 NOTE — PLAN OF CARE
Problem: Chronic Conditions and Co-morbidities  Goal: Patient's chronic conditions and co-morbidity symptoms are monitored and maintained or improved  Outcome: Progressing  Flowsheets (Taken 3/11/2025 0800)  Care Plan - Patient's Chronic Conditions and Co-Morbidity Symptoms are Monitored and Maintained or Improved:   Monitor and assess patient's chronic conditions and comorbid symptoms for stability, deterioration, or improvement   Collaborate with multidisciplinary team to address chronic and comorbid conditions and prevent exacerbation or deterioration     Problem: Discharge Planning  Goal: Discharge to home or other facility with appropriate resources  Outcome: Progressing  Flowsheets (Taken 3/11/2025 0800)  Discharge to home or other facility with appropriate resources:   Identify barriers to discharge with patient and caregiver   Arrange for needed discharge resources and transportation as appropriate     Problem: Safety - Adult  Goal: Free from fall injury  Outcome: Progressing  Flowsheets (Taken 3/10/2025 0247 by Lois Baker, RN)  Free From Fall Injury: Instruct family/caregiver on patient safety  Note: Telesitter at bedside

## 2025-03-11 NOTE — CARE COORDINATION
Per discussion with Rissa MEEKS - Dr. Romero would like the patient go get up to the chair today. Spoke with Presley. OT/PT should be able to work with the patient today.

## 2025-03-11 NOTE — PROGRESS NOTES
Progress Note    Admit Date:  1/30/2025    Interval history    Admitted for flu, copd exacerbation, course complicated with development of sbo, resp failure needing vent support     Tolerated pressure support 5/5 for 6 hours yesterday.  Plans for spontaneous breathing trial and possible extubation today.  On Precedex.    3/5-extubated on 3/4/2025.  Placed on BiPAP.  Has been intermittently confused.  Was on Precedex.  Has had some intermittent agitation.  When I saw the patient his eyes are open.   at bedside.  He has a good cough.    3/6- more calm then yesterday. On Bipap. Failed speech eval but they will see him again. Responding nicely to verbal commands,    3/7- more calm having thick secretions. ST has recommended MBS. He is NPO. Used Bipap last night. BP elevated today.    3/9- MR singer seen awake, but confused, restless was given seroquel for agitation. Confused and not directable only for few minutes and gets restless, throwing stuff  Was on precedex overnight but now off    3/10  Continues to be disoriented   Recd haldol yesterday  Now in restraints  Used BiPAP at night   Now on 5 L     3/11  Disoriented   Off restraints now   biPAP for 4-5 hours last night   On 5 L now       Objective:   Patient Vitals for the past 4 hrs:   BP Pulse Resp SpO2   03/11/25 0600 139/73 98 19 100 %   03/11/25 0500 136/64 91 18 98 %          Intake/Output Summary (Last 24 hours) at 3/11/2025 0819  Last data filed at 3/11/2025 0613  Gross per 24 hour   Intake 466 ml   Output 1410 ml   Net -944 ml       Physical Exam:  General: elderly male ill appearing , confused in bed  awake  Mucous Membranes:  Pink , anicteric  Neck: No JVD, no carotid bruit, no thyromegaly  NG tube noted   Chest diminished in bases  No accessory muscle use.  Minimal wheezing.  Few rhonchi.  Cardiovascular:  RRR S1S2 heard, no murmurs or gallops  Abdomen:  Soft, obese un distended, non tender, no organomegaly, BS absent  Extremities: trace  recommended. -Having thick secretions.   Has NG and getting Tube feeds now   biPAP at night   Now on 5 L     Enteritis with partial SBO resolved  -Ct with enteritis and also has several metabolic issues like above  -NG suction, NPO. Bowel rest given with no change  -General surgery consulted   -Enema and suppository with no benefit,  -given Zosyn, diflucan, vanc ,now off abx   -started TPN support with no return of bowel function.   -Now SBO slowly resolved.   -Monitored electrolytes and replaced as needed  -advance TF to goal rate    Acute metabolic encephalopathy   Developed confusion initially with alcohol withdrawal and possible seizure , later on vent support leading to post extubation deliri um   -supportive care. Ct head neg  -MRI brain ordered but did not tolerate, was incomplete +motion artifact  -had agitation and confusion noted needing precedex   -on Keppra (5/7),zonisamide per neurology  - resume home gabapentin and oxycodone at lower dose to help with withdrawal symptoms  - off all sedative meds  - seroquel/haldol now for agitation   - keep off precedex  -continues to be confused  In restraints   Now off restraints      Focal seizure involving LUE, history of childhood epilepsy   -Neurology consulted  -Seizure precautions  -EEG with mild gen slowing  -Started on keppra by neuro- switched to zonisamide temporarily to lower risk of possible behavioral issues. Zonisamide now held  -Keppra(5/7) restarted  Back on zonisamide     Normocytic anemia   -likely nutritional and iatrogenic  -Hgb 7.3 >7  sp 1 unit PRBC  -monitor CBC   -iron battery -stable     Type 2 diabetes with neuropathy.  -Continue Lantus insulin   -sliding scale insulin  -On gabapentin-holding      Hypomagnesemia.  -continue replacing as needed     Peripheral vascular disease  -Status post aortoiliac bypass graft  -CT abd with significant SMA and bilateral renal artery stenosis as well   -Continue statins  -Unclear why he is not on any

## 2025-03-11 NOTE — PROGRESS NOTES
03/10/25 2326   NIV Type   NIV Started/Stopped (S)  On   Equipment Type V60   Mode Bilevel   Mask Type Full face mask   Mask Size Extra large   Assessment   Pulse 85   Respirations 18   SpO2 95 %   Settings/Measurements   IPAP 15 cmH20   CPAP/EPAP 5 cmH2O   Vt (Measured) 482 mL   Rate Ordered 18   FiO2  45 %   Minute Volume (L/min) 8.7 Liters   Mask Leak (lpm) 35 lpm   Patient's Home Machine No   Alarm Settings   Alarms On Y

## 2025-03-11 NOTE — PROGRESS NOTES
Pt restless, pulling off EKG leads, spo2 monitor, oxygen etc.  Attempted to reorient, pt confused.  Only oriented to self.

## 2025-03-11 NOTE — PROGRESS NOTES
PT/OT at bedside to assist pt getting OOB to chair.  Pt very weak.  Sat on edge of bed with assist.  Pt not holding head up while sitting.  Pt had BM while sitting on side of bed. Pt repositioned back in bed.  Bed pad changed, skin care given. Pt positioned on L side.

## 2025-03-11 NOTE — PROGRESS NOTES
[facility] Vascular Access  Ultrasound Guided Peripheral Insertion Procedure Note.     Sandor Early   Admitted- 1/30/2025  5:28 PM  Admission diagnosis- Hypomagnesemia [E83.42]  Influenza A [J10.1]  COPD exacerbation (HCC) [J44.1]  Respiratory failure with hypoxia (HCC) [J96.91]  Alcohol use disorder [F10.90]  Acute on chronic respiratory failure with hypoxia and hypercapnia (HCC) [J96.21, J96.22]  Acute respiratory failure (HCC) [J96.00]      Attending Physician- Drake Jalloh*  Ordering Physician- Dr. Hillary Jalloh  Indication for Insertion: Limited Access          USG PIV placed to right upper arm using sterile technique. Brisk blood return noted, line flushes with ease. Patient tolerated well. Bed returned to lowest position, call light within reach. See US images below.

## 2025-03-11 NOTE — PROGRESS NOTES
Inpatient Physical Therapy Treatment    Unit: ICU  Date:  3/11/2025  Patient Name:    Sandor Early  Admitting diagnosis:  Hypomagnesemia [E83.42]  Influenza A [J10.1]  COPD exacerbation (HCC) [J44.1]  Respiratory failure with hypoxia (HCC) [J96.91]  Alcohol use disorder [F10.90]  Acute on chronic respiratory failure with hypoxia and hypercapnia (HCC) [J96.21, J96.22]  Acute respiratory failure (HCC) [J96.00]  Admit Date:  1/30/2025  Precautions/Restrictions/WB Status/ Lines/ Wounds/ Oxygen: Fall risk, Lines (IV, Supplemental O2 (5L), internal catheter, and NG tube), No BP/sticks right, Telemetry, Continuous pulse oximetry, Telesitter, and Isolation Precautions: Contact    Pt seen for cotreatment this date due to complexity of condition and need for the assistance of 2 skilled therapists    Treatment Time:  6100-8478  Treatment Number:  2   Timed Code Treatment Minutes: 38 minutes  Total Treatment Minutes:  38 minutes    Patient Stated Goals for Therapy: wants to get out of bed and walk      Discharge Recommendations: SNF  DME needs for discharge: Defer to facility       Therapy recommendation for EMS Transport: requires transport by cot due to pt needs lift equipment for safe transfers    Therapy recommendations for staff:   Assist of 2 for sitting EOB    History of Present Illness:   69 y.o. male with a history of COPD on 4 L of O2 chronically, GERD, hyperlipidemia, alcohol liver cirrhosis admitted 2/8/25 for c/c shortness of breath.   Intubated 2/14-3/4/25     Preadmission Environment: from [facility] 10/24/24:  Pt lives with                                    with family (step son, DIL, and 3 grandkids)  Home environment:                                  mobile home/trailer  Steps to enter first floor:                3 steps to enter and hand rail bilateral  Steps to second floor/basement:   N/A  Laundry:                                         Pt completes  Bathroom:

## 2025-03-11 NOTE — PROGRESS NOTES
RT Inhaler-Nebulizer Bronchodilator Protocol Note    There is a bronchodilator order in the chart from a provider indicating to follow the RT Bronchodilator Protocol and there is an “Initiate RT Inhaler-Nebulizer Bronchodilator Protocol” order as well (see protocol at bottom of note).    CXR Findings:  No results found.    The findings from the last RT Protocol Assessment were as follows:   History Pulmonary Disease: Chronic pulmonary disease  Respiratory Pattern: Dyspnea on exertion or RR 21-25 bpm  Breath Sounds: Inspiratory and expiratory or bilateral wheezing and/or rhonchi  Cough: Weak, productive  Indication for Bronchodilator Therapy: Decreased or absent breath sounds  Bronchodilator Assessment Score: 12    Aerosolized bronchodilator medication orders have been revised according to the RT Inhaler-Nebulizer Bronchodilator Protocol below.    Respiratory Therapist to perform RT Therapy Protocol Assessment initially then follow the protocol.  Repeat RT Therapy Protocol Assessment PRN for score 0-3 or on second treatment, BID, and PRN for scores above 3.    No Indications - adjust the frequency to every 6 hours PRN wheezing or bronchospasm, if no treatments needed after 48 hours then discontinue using Per Protocol order mode.     If indication present, adjust the RT bronchodilator orders based on the Bronchodilator Assessment Score as indicated below.  Use Inhaler orders unless patient has one or more of the following: on home nebulizer, not able to hold breath for 10 seconds, is not alert and oriented, cannot activate and use MDI correctly, or respiratory rate 25 breaths per minute or more, then use the equivalent nebulizer order(s) with same Frequency and PRN reasons based on the score.  If a patient is on this medication at home then do not decrease Frequency below that used at home.    0-3 - enter or revise RT bronchodilator order(s) to equivalent RT Bronchodilator order with Frequency of every 4 hours PRN for

## 2025-03-11 NOTE — PROGRESS NOTES
Inpatient Occupational Therapy Treatment    Unit: ICU  Date:  3/11/2025  Patient Name:    Sandor Early  Admitting diagnosis:  Hypomagnesemia [E83.42]  Influenza A [J10.1]  COPD exacerbation (HCC) [J44.1]  Respiratory failure with hypoxia (HCC) [J96.91]  Alcohol use disorder [F10.90]  Acute on chronic respiratory failure with hypoxia and hypercapnia (HCC) [J96.21, J96.22]  Acute respiratory failure (HCC) [J96.00]  Admit Date:  1/30/2025  Precautions/Restrictions/WB Status/ Lines/ Wounds/ Oxygen: Fall risk, Bed/chair alarm, Lines (IV, Supplemental O2 (5L), internal catheter, and NG tube), Confusion, Telemetry, Continuous pulse oximetry, Telesitter, and Isolation Precautions: Contact; no BP on the RUE    Pt seen for cotreatment this date due to patient safety, patient endurance, acute illness/injury, and need for the assistance of 2 skilled therapists    Treatment Time:  8084-6368  Treatment Number:  2  Timed Code Treatment Minutes: 38 minutes  Total Treatment Minutes:  38  minutes    Patient Goals for Therapy: none stated          Discharge Recommendations: SNF  DME needs for discharge: Defer to facility       Therapy recommendations for staff:   Assist of 2 for sitting EOB    History of Present Illness: per H&P   Sandor is a 69 y.o. male with a history of COPD on 4 L of O2 chronically, GERD, hyperlipidemia, alcohol liver cirrhosis, who presents to the emergency department by EMS for shortness of breath.  Patient reports that he has felt short of breath with increasing cough and fever over the past 3 to 4 days.  Upon EMS arrival patient was in moderate to severe respiratory distress O2 sat 88%.  He was placed on O2.  He received Solu-Medrol and DuoNeb.  EMS team stated that he was agitated trying to get out of the squad and complaining of diffuse pain back legs etc. he has a history of neuropathy.  They then gave him 10 mg of morphine.  Upon arrival patient is ill-appearing in respiratory distress poor skin color  very tachypneic retractions.  He denies chest pain.     Patient has a history of alcohol use disorder.  He tells me he now only drinks 1 sometimes 2 beers a day.  He denies getting shakes if he does not drink.      past medical history of hepatic cirrhosis, hypertension, hyperlipidemia, diabetes, peripheral neuropathy, history of bladder cancer status post removal, who presented to the hospital with complaints of shortness of breath, chest pain.       Preadmission Environment:    from OT eval 10/24/24:  Pt lives with                                    with family (step son, DIL, and 3 grandkids)  Home environment:                                  mobile home/trailer  Steps to enter first floor:                3 steps to enter and hand rail bilateral  Steps to second floor/basement:   N/A  Bathroom:                                      tub/shower unit and comfort height toilet  Pt sleeps in a:                                Flat bed  Equipment owned:                         RW, SPC, and home O2 (4L) continous     Preadmission Status:  Pt able to drive: No, niece's  or brother take him to appts  Pt fully independent with ADLs: Yes  Pt receives assistance from family for: Laundry   Pt independent for functional transfers and utilized No Device for mobility in home and No Device out in community  History of falls:         Yes, \"a few, ankles have given out\"  Home Health Services: None    AM-PAC Score: AM-PAC Inpatient Daily Activity Raw Score: 6     Subjective:  Patient lying reclined in bed with no family present.   Pt agreeable to this OT session.     Cognition:    A&O orientation not directly assessed.    Able to follow 1 step commands, 50% of the time    Pain:   No    Activity Tolerance:   Pt completed therapy session with fatigue     BP (mmHg) HR (bpm) SpO2 (%) on 5L Comments   Supine at rest  102 93    Seated at EOB       Standing       End of session         Objective:  Does this pt have an acute or acute

## 2025-03-11 NOTE — PROGRESS NOTES
03/11/25 0255   NIV Type   Equipment Type V60   Mode Bilevel   Mask Type Full face mask   Mask Size Extra large   Assessment   Pulse 94   Respirations 21   SpO2 97 %   Settings/Measurements   IPAP 15 cmH20   CPAP/EPAP 5 cmH2O   Vt (Measured) 635 mL   Rate Ordered 18   FiO2  45 %   Minute Volume (L/min) 13 Liters   Mask Leak (lpm) 51 lpm   Patient's Home Machine No   Alarm Settings   Alarms On Y

## 2025-03-12 ENCOUNTER — CARE COORDINATION (OUTPATIENT)
Dept: CARE COORDINATION | Age: 70
End: 2025-03-12

## 2025-03-12 LAB
AMMONIA PLAS-SCNC: 37 UMOL/L (ref 16–60)
ANION GAP SERPL CALCULATED.3IONS-SCNC: 6 MMOL/L (ref 3–16)
BASOPHILS # BLD: 0 K/UL (ref 0–0.2)
BASOPHILS NFR BLD: 0 %
BUN SERPL-MCNC: 10 MG/DL (ref 7–20)
CALCIUM SERPL-MCNC: 9.2 MG/DL (ref 8.3–10.6)
CHLORIDE SERPL-SCNC: 96 MMOL/L (ref 99–110)
CO2 SERPL-SCNC: 37 MMOL/L (ref 21–32)
CREAT SERPL-MCNC: 0.5 MG/DL (ref 0.8–1.3)
DACRYOCYTES BLD QL SMEAR: ABNORMAL
DEPRECATED RDW RBC AUTO: 17.1 % (ref 12.4–15.4)
EOSINOPHIL # BLD: 0.2 K/UL (ref 0–0.6)
EOSINOPHIL NFR BLD: 2 %
GFR SERPLBLD CREATININE-BSD FMLA CKD-EPI: >90 ML/MIN/{1.73_M2}
GLUCOSE BLD-MCNC: 118 MG/DL (ref 70–99)
GLUCOSE BLD-MCNC: 120 MG/DL (ref 70–99)
GLUCOSE BLD-MCNC: 121 MG/DL (ref 70–99)
GLUCOSE BLD-MCNC: 134 MG/DL (ref 70–99)
GLUCOSE BLD-MCNC: 134 MG/DL (ref 70–99)
GLUCOSE BLD-MCNC: 144 MG/DL (ref 70–99)
GLUCOSE SERPL-MCNC: 130 MG/DL (ref 70–99)
HCT VFR BLD AUTO: 33 % (ref 40.5–52.5)
HGB BLD-MCNC: 10.5 G/DL (ref 13.5–17.5)
HYPOCHROMIA BLD QL SMEAR: ABNORMAL
LYMPHOCYTES # BLD: 2.1 K/UL (ref 1–5.1)
LYMPHOCYTES NFR BLD: 21 %
MCH RBC QN AUTO: 28.2 PG (ref 26–34)
MCHC RBC AUTO-ENTMCNC: 31.7 G/DL (ref 31–36)
MCV RBC AUTO: 89.1 FL (ref 80–100)
MONOCYTES # BLD: 0.2 K/UL (ref 0–1.3)
MONOCYTES NFR BLD: 2 %
NEUTROPHILS # BLD: 7.4 K/UL (ref 1.7–7.7)
NEUTROPHILS NFR BLD: 75 %
PERFORMED ON: ABNORMAL
PLATELET # BLD AUTO: 451 K/UL (ref 135–450)
PLATELET BLD QL SMEAR: ADEQUATE
PMV BLD AUTO: 7.6 FL (ref 5–10.5)
POIKILOCYTOSIS BLD QL SMEAR: ABNORMAL
POLYCHROMASIA BLD QL SMEAR: ABNORMAL
POTASSIUM SERPL-SCNC: 4.2 MMOL/L (ref 3.5–5.1)
RBC # BLD AUTO: 3.71 M/UL (ref 4.2–5.9)
SLIDE REVIEW: ABNORMAL
SODIUM SERPL-SCNC: 139 MMOL/L (ref 136–145)
WBC # BLD AUTO: 9.8 K/UL (ref 4–11)

## 2025-03-12 PROCEDURE — 6370000000 HC RX 637 (ALT 250 FOR IP): Performed by: INTERNAL MEDICINE

## 2025-03-12 PROCEDURE — 2700000000 HC OXYGEN THERAPY PER DAY

## 2025-03-12 PROCEDURE — 85025 COMPLETE CBC W/AUTO DIFF WBC: CPT

## 2025-03-12 PROCEDURE — 82140 ASSAY OF AMMONIA: CPT

## 2025-03-12 PROCEDURE — 6360000002 HC RX W HCPCS: Performed by: INTERNAL MEDICINE

## 2025-03-12 PROCEDURE — 36415 COLL VENOUS BLD VENIPUNCTURE: CPT

## 2025-03-12 PROCEDURE — 94660 CPAP INITIATION&MGMT: CPT

## 2025-03-12 PROCEDURE — 94640 AIRWAY INHALATION TREATMENT: CPT

## 2025-03-12 PROCEDURE — 2000000000 HC ICU R&B

## 2025-03-12 PROCEDURE — 80048 BASIC METABOLIC PNL TOTAL CA: CPT

## 2025-03-12 PROCEDURE — 2500000003 HC RX 250 WO HCPCS: Performed by: STUDENT IN AN ORGANIZED HEALTH CARE EDUCATION/TRAINING PROGRAM

## 2025-03-12 PROCEDURE — 94669 MECHANICAL CHEST WALL OSCILL: CPT

## 2025-03-12 PROCEDURE — 6370000000 HC RX 637 (ALT 250 FOR IP): Performed by: NEUROMUSCULOSKELETAL MEDICINE & OMM

## 2025-03-12 PROCEDURE — 2580000003 HC RX 258: Performed by: INTERNAL MEDICINE

## 2025-03-12 PROCEDURE — 2500000003 HC RX 250 WO HCPCS: Performed by: INTERNAL MEDICINE

## 2025-03-12 PROCEDURE — 6370000000 HC RX 637 (ALT 250 FOR IP): Performed by: STUDENT IN AN ORGANIZED HEALTH CARE EDUCATION/TRAINING PROGRAM

## 2025-03-12 PROCEDURE — 94761 N-INVAS EAR/PLS OXIMETRY MLT: CPT

## 2025-03-12 PROCEDURE — 99233 SBSQ HOSP IP/OBS HIGH 50: CPT | Performed by: INTERNAL MEDICINE

## 2025-03-12 PROCEDURE — 31720 CLEARANCE OF AIRWAYS: CPT

## 2025-03-12 PROCEDURE — 99232 SBSQ HOSP IP/OBS MODERATE 35: CPT | Performed by: INTERNAL MEDICINE

## 2025-03-12 RX ORDER — QUETIAPINE FUMARATE 25 MG/1
50 TABLET, FILM COATED ORAL 2 TIMES DAILY
Status: DISCONTINUED | OUTPATIENT
Start: 2025-03-12 | End: 2025-03-19

## 2025-03-12 RX ORDER — LANOLIN ALCOHOL/MO/W.PET/CERES
100 CREAM (GRAM) TOPICAL DAILY
Status: DISCONTINUED | OUTPATIENT
Start: 2025-03-12 | End: 2025-03-17

## 2025-03-12 RX ADMIN — ZONISAMIDE 50 MG: 25 CAPSULE ORAL at 13:33

## 2025-03-12 RX ADMIN — ALBUTEROL SULFATE 2.5 MG: 2.5 SOLUTION RESPIRATORY (INHALATION) at 19:20

## 2025-03-12 RX ADMIN — METOPROLOL TARTRATE 50 MG: 50 TABLET, FILM COATED ORAL at 08:56

## 2025-03-12 RX ADMIN — QUETIAPINE FUMARATE 50 MG: 25 TABLET ORAL at 20:37

## 2025-03-12 RX ADMIN — GABAPENTIN 200 MG: 250 SOLUTION ORAL at 20:39

## 2025-03-12 RX ADMIN — DILTIAZEM HYDROCHLORIDE 60 MG: 60 TABLET ORAL at 22:39

## 2025-03-12 RX ADMIN — QUETIAPINE FUMARATE 25 MG: 25 TABLET ORAL at 08:56

## 2025-03-12 RX ADMIN — LACTULOSE 30 G: 10 SOLUTION ORAL at 08:54

## 2025-03-12 RX ADMIN — LACTULOSE 30 G: 10 SOLUTION ORAL at 13:33

## 2025-03-12 RX ADMIN — Medication 15 ML: at 13:34

## 2025-03-12 RX ADMIN — DILTIAZEM HYDROCHLORIDE 60 MG: 60 TABLET ORAL at 07:33

## 2025-03-12 RX ADMIN — ALLOPURINOL 300 MG: 300 TABLET ORAL at 08:54

## 2025-03-12 RX ADMIN — ATORVASTATIN CALCIUM 40 MG: 40 TABLET, FILM COATED ORAL at 08:54

## 2025-03-12 RX ADMIN — DULOXETINE HYDROCHLORIDE 30 MG: 30 CAPSULE, DELAYED RELEASE ORAL at 08:56

## 2025-03-12 RX ADMIN — RIFAXIMIN 550 MG: 550 TABLET ORAL at 08:54

## 2025-03-12 RX ADMIN — SODIUM CHLORIDE, PRESERVATIVE FREE 10 ML: 5 INJECTION INTRAVENOUS at 20:40

## 2025-03-12 RX ADMIN — ZONISAMIDE 50 MG: 25 CAPSULE ORAL at 22:39

## 2025-03-12 RX ADMIN — RIFAXIMIN 550 MG: 550 TABLET ORAL at 20:38

## 2025-03-12 RX ADMIN — GABAPENTIN 200 MG: 250 SOLUTION ORAL at 15:27

## 2025-03-12 RX ADMIN — Medication 750 MG: at 22:39

## 2025-03-12 RX ADMIN — ALBUTEROL SULFATE 2.5 MG: 2.5 SOLUTION RESPIRATORY (INHALATION) at 15:37

## 2025-03-12 RX ADMIN — ENOXAPARIN SODIUM 40 MG: 100 INJECTION SUBCUTANEOUS at 08:55

## 2025-03-12 RX ADMIN — DILTIAZEM HYDROCHLORIDE 60 MG: 60 TABLET ORAL at 13:33

## 2025-03-12 RX ADMIN — Medication 5000 UNITS: at 08:54

## 2025-03-12 RX ADMIN — ALBUTEROL SULFATE 2.5 MG: 2.5 SOLUTION RESPIRATORY (INHALATION) at 08:26

## 2025-03-12 RX ADMIN — METOPROLOL TARTRATE 50 MG: 50 TABLET, FILM COATED ORAL at 20:38

## 2025-03-12 RX ADMIN — ALBUTEROL SULFATE 2.5 MG: 2.5 SOLUTION RESPIRATORY (INHALATION) at 11:27

## 2025-03-12 RX ADMIN — LACTULOSE 30 G: 10 SOLUTION ORAL at 20:37

## 2025-03-12 RX ADMIN — GABAPENTIN 200 MG: 250 SOLUTION ORAL at 08:53

## 2025-03-12 RX ADMIN — SODIUM CHLORIDE, PRESERVATIVE FREE 10 ML: 5 INJECTION INTRAVENOUS at 08:57

## 2025-03-12 RX ADMIN — OXYCODONE 5 MG: 5 TABLET ORAL at 16:53

## 2025-03-12 RX ADMIN — Medication 750 MG: at 08:58

## 2025-03-12 RX ADMIN — PANTOPRAZOLE SODIUM 40 MG: 40 INJECTION, POWDER, LYOPHILIZED, FOR SOLUTION INTRAVENOUS at 08:54

## 2025-03-12 ASSESSMENT — PAIN SCALES - PAIN ASSESSMENT IN ADVANCED DEMENTIA (PAINAD)
CONSOLABILITY: DISTRACTED OR REASSURED BY VOICE/TOUCH
NEGVOCALIZATION: REPEATED TROUBLED CALLING OUT, LOUD MOANING/GROANING, CRYING
BODYLANGUAGE: TENSE, DISTRESSED PACING, FIDGETING

## 2025-03-12 NOTE — PROGRESS NOTES
Reassessment completed (see Flowsheet )  Pt resting  in bed BiPAP in place, SP02  97 % . All ICU lines and monitoring  in place .

## 2025-03-12 NOTE — PROGRESS NOTES
Care rounds completed with Dr. Romero and multidisciplinary team. Reviewed labs, meds, VS, assessment, & plan of care for today.  -plan for [facility] if able.    Case management called.   - on 5L HFNC, 10L yesterday.  -increase Seroquel    See dictated note and new orders for details.

## 2025-03-12 NOTE — PROGRESS NOTES
03/12/25 0314   NIV Type   Equipment Type V60   Mode Bilevel   Mask Type Full face mask   Mask Size Extra large   Assessment   Pulse 91   Respirations 22   SpO2 94 %   Settings/Measurements   IPAP 15 cmH20   CPAP/EPAP 5 cmH2O   Vt (Measured) 517 mL   Rate Ordered 18   FiO2  55 %   Minute Volume (L/min) 11.3 Liters   Mask Leak (lpm) 26 lpm   Patient's Home Machine No   Alarm Settings   Alarms On Y

## 2025-03-12 NOTE — PROGRESS NOTES
Reassessment completed (see Flowsheet). All ICU lines remain intact, ICU monitoring continued-     Multi BMs for shift.     Bilat Wrist restraints placed for safety of lines.    Update called to Daughter- Catia. Informed of precert for [facility]- and is agreeable.     Pt remains confused but redirectable.

## 2025-03-12 NOTE — CARE COORDINATION
Spoke with Krystina MEEKS who has concerns that the patient would do better if he could first go to an LTACH. Patient doesn't qualify with the typical criteria.     Per chart review the patient is still on NG feeds.    Spoke with Shyanne to see if patient could qualify for NG feeds and if there is anything else that could help qualify the patient. Madi patient gets a point for being recently extubated. The NG feeds also count. Shyanne advised the patient is a [facility] patient due to insurance - however because the patient lives in [place] and that is over 30 miles from [place] he will default to [facility].    Shyanne will review chart and will call CM back.      Received call back from Shyanne. Patient does qualify.    Spoke to Krystina. Okay to start precert.    Shyanne will start precert today.

## 2025-03-12 NOTE — PLAN OF CARE
Problem: Chronic Conditions and Co-morbidities  Goal: Patient's chronic conditions and co-morbidity symptoms are monitored and maintained or improved  Outcome: Progressing     Problem: Discharge Planning  Goal: Discharge to home or other facility with appropriate resources  Outcome: Progressing     Problem: Safety - Adult  Goal: Free from fall injury  Outcome: Progressing     Problem: Pain  Goal: Verbalizes/displays adequate comfort level or baseline comfort level  Outcome: Progressing     Problem: Respiratory - Adult  Goal: Achieves optimal ventilation and oxygenation  Outcome: Progressing     Problem: Cardiovascular - Adult  Goal: Maintains optimal cardiac output and hemodynamic stability  Outcome: Progressing     Problem: Skin/Tissue Integrity - Adult  Goal: Skin integrity remains intact  Description: 1.  Monitor for areas of redness and/or skin breakdown  2.  Assess vascular access sites hourly  3.  Every 4-6 hours minimum:  Change oxygen saturation probe site  4.  Every 4-6 hours:  If on nasal continuous positive airway pressure, respiratory therapy assess nares and determine need for appliance change or resting period  Outcome: Progressing     Problem: Musculoskeletal - Adult  Goal: Return mobility to safest level of function  Outcome: Progressing     Problem: Gastrointestinal - Adult  Goal: Maintains or returns to baseline bowel function  Outcome: Progressing     Problem: Metabolic/Fluid and Electrolytes - Adult  Goal: Electrolytes maintained within normal limits  Outcome: Progressing     Problem: Confusion  Goal: Confusion, delirium, dementia, or psychosis is improved or at baseline  Description: INTERVENTIONS:  1. Assess for possible contributors to thought disturbance, including medications, impaired vision or hearing, underlying metabolic abnormalities, dehydration, psychiatric diagnoses, and notify attending LIP  2. Effie high risk fall precautions, as indicated  3. Provide frequent short contacts

## 2025-03-12 NOTE — PROGRESS NOTES
03/12/25 0827   Cough/Sputum   Sputum How Obtained Suctioned;Nasal tracheal   $Obtained Sample $Nasotracheal Suction   Cough Productive   Sputum Amount Large   Sputum Color Creamy;Yellow   Tenacity Thick

## 2025-03-12 NOTE — PROGRESS NOTES
Shift handoff report given to Charissa MEEKS at bedside.   Pt is Awake and alert  IV handoff completed. Telesitter remains at bedside/   Call light within reach, bed in lowest position, bed alarm on. End of shift checks completed.    Pt has been free of falls for duration of shift. .

## 2025-03-12 NOTE — PROGRESS NOTES
Shift assessment was completed (see flow sheet). Pt is A/O to self. Redirectable but confused. Pt is intermittently restless.  Telesitter at bedside.      Respirations are even, unlabored, with rhonchi sounds. On 5L O2 via HFNC. Bipap removed by RT.     Scheduled medications to follow- crushed with NGT to L nare. Bridle intact.      Infusing:  N/A  Call light within reach. Bed in lowest position. Bed alarm on.     Patient is not able to demonstrated the ability to move from a reclining position to an upright position within the recliner. Patient is confused, demented and /or unable to follow instruction.

## 2025-03-12 NOTE — PROGRESS NOTES
Reassessment completed (see Flowsheet). All ICU lines remain intact, ICU monitoring continued-     Pt is A/O to self. Confused, throwing legs out of bed.   Telesitter in place.  Pt remains on 5L HFNC.   Lung sounds Rhonchi  Pt's blood pressures WDL.

## 2025-03-12 NOTE — PROGRESS NOTES
03/11/25 7948   NIV Type   NIV Started/Stopped On   Equipment Type v60   Mode Bilevel   Mask Type Full face mask   Mask Size Extra large   Assessment   Pulse (!) 104   Respirations 19   SpO2 96 %   Comfort Level Good   Using Accessory Muscles No   Mask Compliance Good   Skin Assessment Clean, dry, & intact   Skin Protection for O2 Device Yes   Orientation Middle   Location Nose   Intervention(s) Skin Barrier   Breath Sounds   Respiratory Pattern Regular   Right Upper Lobe Diminished;Rhonchi   Right Middle Lobe Diminished   Right Lower Lobe Diminished   Left Upper Lobe Diminished;Rhonchi   Left Lower Lobe Diminished   Settings/Measurements   PIP Observed 15 cm H20   IPAP 15 cmH20   CPAP/EPAP 5 cmH2O   Vt (Measured) 704 mL   Rate Ordered 18   Insp Rise Time (%) 1 %   FiO2  55 %   I Time/ I Time % 0.9 s   Minute Volume (L/min) 12.7 Liters   Mask Leak (lpm) 51 lpm   Patient's Home Machine No   Alarm Settings   Alarms On Y   Low Pressure (cmH2O) 5 cmH2O   High Pressure (cmH2O) 40 cmH2O   Apnea (secs) 20 secs   RR Low (bpm) 12   RR High (bpm) 40 br/min

## 2025-03-12 NOTE — PROGRESS NOTES
Speech Language Pathology  Attempt Note     Name: Sandor Early  : 1955  Medical Diagnosis: Hypomagnesemia [E83.42]  Influenza A [J10.1]  COPD exacerbation (HCC) [J44.1]  Respiratory failure with hypoxia (HCC) [J96.91]  Alcohol use disorder [F10.90]  Acute on chronic respiratory failure with hypoxia and hypercapnia (HCC) [J96.21, J96.22]  Acute respiratory failure (HCC) [J96.00]      SLP attempted to see pt for dysphagia tx. SLP spoke with Krystina/RN who reports pt continues to have difficulty managing secretions.  Pt not appropriate for po trials this day. SLP to re-attempt as pt's condition and schedule allows. RN aware. No charges.   Thank you,    Tayla Sehlby M.A. SLP  Speech-Language Pathologist  [place] Lic #[id number]  [phone]

## 2025-03-12 NOTE — PROGRESS NOTES
Shift assessment completed, pt confused follow commands. Bridled NG in place with Diabetic at 60 ml/hr per orders. Castrejon in place and draining .   Pt pulling at lines and trying to get up leaning half way out of bed head first . Perfect serve sent to DR Chiang orders received  placed bi lat wrist restraints.  Patient is not able to demonstrate the ability to move from a reclining position to an upright position within the recliner due to weak .

## 2025-03-12 NOTE — PLAN OF CARE
Problem: Chronic Conditions and Co-morbidities  Goal: Patient's chronic conditions and co-morbidity symptoms are monitored and maintained or improved  3/12/2025 1530 by Krystina Buchanan RN  Outcome: Progressing  3/12/2025 0831 by Charissa Daugherty RN  Outcome: Progressing     Problem: Discharge Planning  Goal: Discharge to home or other facility with appropriate resources  3/12/2025 1530 by Krystina Buchanan RN  Outcome: Progressing  3/12/2025 0831 by Charissa Daugherty RN  Outcome: Progressing     Problem: Safety - Adult  Goal: Free from fall injury  3/12/2025 0831 by Charissa Daugherty RN  Outcome: Progressing     Problem: Pain  Goal: Verbalizes/displays adequate comfort level or baseline comfort level  3/12/2025 1530 by Krystina Buchanan RN  Outcome: Progressing  3/12/2025 0831 by Charissa Daugherty RN  Outcome: Progressing     Problem: Respiratory - Adult  Goal: Achieves optimal ventilation and oxygenation  3/12/2025 1530 by Krystina Buchanan RN  Outcome: Progressing  3/12/2025 0831 by Charissa Daugherty RN  Outcome: Progressing     Problem: Cardiovascular - Adult  Goal: Maintains optimal cardiac output and hemodynamic stability  3/12/2025 0831 by Charissa Daugherty RN  Outcome: Progressing  Goal: Absence of cardiac dysrhythmias or at baseline  3/12/2025 0831 by Charissa Daugherty RN  Outcome: Progressing     Problem: Skin/Tissue Integrity - Adult  Goal: Skin integrity remains intact  Description: 1.  Monitor for areas of redness and/or skin breakdown  2.  Assess vascular access sites hourly  3.  Every 4-6 hours minimum:  Change oxygen saturation probe site  4.  Every 4-6 hours:  If on nasal continuous positive airway pressure, respiratory therapy assess nares and determine need for appliance change or resting period  3/12/2025 1530 by Krystina Buchanan RN  Outcome: Progressing  3/12/2025 0831 by Charissa Daugherty RN  Outcome: Progressing  Goal: Oral mucous membranes remain intact  3/12/2025 1530 by Krystina Buchanan RN  Outcome:

## 2025-03-12 NOTE — PROGRESS NOTES
Progress Note    Admit Date:  1/30/2025    Interval history    Admitted for flu, copd exacerbation, course complicated with development of sbo, resp failure needing vent support     Tolerated pressure support 5/5 for 6 hours yesterday.  Plans for spontaneous breathing trial and possible extubation today.  On Precedex.    3/5-extubated on 3/4/2025.  Placed on BiPAP.  Has been intermittently confused.  Was on Precedex.  Has had some intermittent agitation.  When I saw the patient his eyes are open.   at bedside.  He has a good cough.    3/6- more calm then yesterday. On Bipap. Failed speech eval but they will see him again. Responding nicely to verbal commands,    3/7- more calm having thick secretions. ST has recommended MBS. He is NPO. Used Bipap last night. BP elevated today.    3/9- MR singer seen awake, but confused, restless was given seroquel for agitation. Confused and not directable only for few minutes and gets restless, throwing stuff  Was on precedex overnight but now off    3/10  Continues to be disoriented   Recd haldol yesterday  Now in restraints  Used BiPAP at night   Now on 5 L     3/11  Disoriented   Off restraints now   biPAP for 4-5 hours last night   On 5 L now     3/12   Continues to be confused   On BiPAP at night   5 L now       Objective:   Patient Vitals for the past 4 hrs:   BP Pulse   03/12/25 0733 137/72 (!) 102          Intake/Output Summary (Last 24 hours) at 3/12/2025 0738  Last data filed at 3/11/2025 2247  Gross per 24 hour   Intake 1221 ml   Output 2075 ml   Net -854 ml       Physical Exam:  General: elderly male ill appearing , confused in bed  awake  Mucous Membranes:  Pink , anicteric  Neck: No JVD, no carotid bruit, no thyromegaly  NG tube noted   Chest diminished in bases  No accessory muscle use.  Minimal wheezing.  Few rhonchi.  Cardiovascular:  RRR S1S2 heard, no murmurs or gallops  Abdomen:  Soft, obese un distended, non tender, no organomegaly, BS  decompensation   -use prn lasix as needed      Chronic pain.  -cymbalta, high dose gabapentin , PRN oxycodone. All held  Resume as needed     Hx cirrhosis.  -2/2 [place], alcohol.    DVT Prophylaxis: Lovenox     Diet: Diet NPO  ADULT TUBE FEEDING; Nasogastric; Diabetic; Continuous; 20; Yes; 20; Q 4 hours; 60; 120; Q 3 hours  Code Status: Full Code      JAROCHO SWIFT MD, 3/12/2025 7:38 AM

## 2025-03-12 NOTE — PROGRESS NOTES
Pulmonary & Critical Care Medicine ICU Progress Note    CC: COPD respiratory failure    Events of Last 24 hours:  BiPAP 15/5 45%   No drips   7---> 5 L  In restrain this am for agitation   Strong cough   Secretions with NTS       Vascular lines: IV: Left IJ 2/14-2/26. R Midline 2/25    MV: 2/14-3/4/25  Vent Mode: AC/VC Resp Rate (Set): 0 bpm/Vt (Set, mL): 0 mL/ /FiO2 : 55 %  No results for input(s): \"PHART\", \"WCD5QUI\", \"PO2ART\" in the last 72 hours.        IV:   sodium chloride      sodium chloride      dextrose         Vitals:  Blood pressure 137/72, pulse (!) 102, temperature 97.3 °F (36.3 °C), temperature source Temporal, resp. rate 22, height 1.702 m (5' 7.01\"), weight 79.7 kg (175 lb 11.3 oz), SpO2 94%.  on 5 L    EXAM:  General: ill appearing.    Eyes: No sclera icterus. No conjunctival injection.  ENT: No discharge.   Neck: Trachea midline.   Resp: No accessory muscle use. Few crackles. No wheezing. + rhonchi.   CV: Regular  rate. Regular rhythm. No mumur or rub. No edema.   GI: Non-tender. Non-distended.   Skin: Warm and dry. No rash on exposed extremities.  M/S: No cyanosis. No clubbing.   Neuro: + following commands.  Good cough.   Psych: No agiation. Alert to name        Scheduled Meds:   rifAXIMin  550 mg Oral BID    lactulose  30 g Oral TID    levETIRAcetam  750 mg Oral BID    zonisamide  50 mg Oral BID    Gabapentin  200 mg Per NG tube Q6H    DULoxetine  30 mg Oral Daily    cloNIDine  1 patch TransDERmal Weekly    CENTRUM/CERTA-KAYKAY with minerals oral  15 mL Oral Daily    dilTIAZem  60 mg Oral 3 times per day    QUEtiapine  25 mg Oral BID    metoprolol tartrate  50 mg Oral BID    polyethylene glycol  17 g Oral Daily    sodium chloride flush  5-40 mL IntraVENous 2 times per day    insulin lispro  0-16 Units SubCUTAneous Q4H    albuterol  2.5 mg Nebulization 4x Daily RT    sodium chloride flush  5-40 mL IntraVENous 2 times per day    pantoprazole (PROTONIX) 40 mg in sodium chloride (PF) 0.9 % 10 mL

## 2025-03-13 LAB
AMMONIA PLAS-SCNC: 41 UMOL/L (ref 16–60)
ANION GAP SERPL CALCULATED.3IONS-SCNC: 8 MMOL/L (ref 3–16)
ANISOCYTOSIS BLD QL SMEAR: ABNORMAL
BASOPHILS # BLD: 0 K/UL (ref 0–0.2)
BASOPHILS NFR BLD: 0 %
BUN SERPL-MCNC: 11 MG/DL (ref 7–20)
CALCIUM SERPL-MCNC: 9.1 MG/DL (ref 8.3–10.6)
CHLORIDE SERPL-SCNC: 95 MMOL/L (ref 99–110)
CO2 SERPL-SCNC: 32 MMOL/L (ref 21–32)
CREAT SERPL-MCNC: 0.6 MG/DL (ref 0.8–1.3)
DEPRECATED RDW RBC AUTO: 17.2 % (ref 12.4–15.4)
EOSINOPHIL # BLD: 0.3 K/UL (ref 0–0.6)
EOSINOPHIL NFR BLD: 4 %
GFR SERPLBLD CREATININE-BSD FMLA CKD-EPI: >90 ML/MIN/{1.73_M2}
GLUCOSE BLD-MCNC: 128 MG/DL (ref 70–99)
GLUCOSE BLD-MCNC: 129 MG/DL (ref 70–99)
GLUCOSE BLD-MCNC: 131 MG/DL (ref 70–99)
GLUCOSE SERPL-MCNC: 121 MG/DL (ref 70–99)
HCT VFR BLD AUTO: 31.7 % (ref 40.5–52.5)
HGB BLD-MCNC: 10 G/DL (ref 13.5–17.5)
LYMPHOCYTES # BLD: 1.8 K/UL (ref 1–5.1)
LYMPHOCYTES NFR BLD: 23 %
MCH RBC QN AUTO: 28.7 PG (ref 26–34)
MCHC RBC AUTO-ENTMCNC: 31.5 G/DL (ref 31–36)
MCV RBC AUTO: 91.2 FL (ref 80–100)
MONOCYTES # BLD: 0.7 K/UL (ref 0–1.3)
MONOCYTES NFR BLD: 9 %
NEUTROPHILS # BLD: 5 K/UL (ref 1.7–7.7)
NEUTROPHILS NFR BLD: 63 %
NEUTS BAND NFR BLD MANUAL: 1 % (ref 0–7)
PERFORMED ON: ABNORMAL
PLATELET # BLD AUTO: 344 K/UL (ref 135–450)
PLATELET BLD QL SMEAR: ADEQUATE
PMV BLD AUTO: 8.5 FL (ref 5–10.5)
POIKILOCYTOSIS BLD QL SMEAR: ABNORMAL
POLYCHROMASIA BLD QL SMEAR: ABNORMAL
POTASSIUM SERPL-SCNC: 4.4 MMOL/L (ref 3.5–5.1)
RBC # BLD AUTO: 3.48 M/UL (ref 4.2–5.9)
SODIUM SERPL-SCNC: 135 MMOL/L (ref 136–145)
WBC # BLD AUTO: 7.8 K/UL (ref 4–11)

## 2025-03-13 PROCEDURE — 80048 BASIC METABOLIC PNL TOTAL CA: CPT

## 2025-03-13 PROCEDURE — 94660 CPAP INITIATION&MGMT: CPT

## 2025-03-13 PROCEDURE — 99232 SBSQ HOSP IP/OBS MODERATE 35: CPT | Performed by: INTERNAL MEDICINE

## 2025-03-13 PROCEDURE — 2580000003 HC RX 258: Performed by: INTERNAL MEDICINE

## 2025-03-13 PROCEDURE — 2700000000 HC OXYGEN THERAPY PER DAY

## 2025-03-13 PROCEDURE — 6360000002 HC RX W HCPCS: Performed by: INTERNAL MEDICINE

## 2025-03-13 PROCEDURE — 36415 COLL VENOUS BLD VENIPUNCTURE: CPT

## 2025-03-13 PROCEDURE — 6370000000 HC RX 637 (ALT 250 FOR IP): Performed by: NEUROMUSCULOSKELETAL MEDICINE & OMM

## 2025-03-13 PROCEDURE — 94640 AIRWAY INHALATION TREATMENT: CPT

## 2025-03-13 PROCEDURE — 2000000000 HC ICU R&B

## 2025-03-13 PROCEDURE — 6370000000 HC RX 637 (ALT 250 FOR IP): Performed by: STUDENT IN AN ORGANIZED HEALTH CARE EDUCATION/TRAINING PROGRAM

## 2025-03-13 PROCEDURE — 6370000000 HC RX 637 (ALT 250 FOR IP): Performed by: INTERNAL MEDICINE

## 2025-03-13 PROCEDURE — 2500000003 HC RX 250 WO HCPCS: Performed by: INTERNAL MEDICINE

## 2025-03-13 PROCEDURE — 2500000003 HC RX 250 WO HCPCS: Performed by: STUDENT IN AN ORGANIZED HEALTH CARE EDUCATION/TRAINING PROGRAM

## 2025-03-13 PROCEDURE — 99233 SBSQ HOSP IP/OBS HIGH 50: CPT | Performed by: INTERNAL MEDICINE

## 2025-03-13 PROCEDURE — 85025 COMPLETE CBC W/AUTO DIFF WBC: CPT

## 2025-03-13 PROCEDURE — 82140 ASSAY OF AMMONIA: CPT

## 2025-03-13 PROCEDURE — 94761 N-INVAS EAR/PLS OXIMETRY MLT: CPT

## 2025-03-13 RX ORDER — SODIUM CHLORIDE FOR INHALATION 3 %
4 VIAL, NEBULIZER (ML) INHALATION
Status: DISCONTINUED | OUTPATIENT
Start: 2025-03-13 | End: 2025-03-25 | Stop reason: HOSPADM

## 2025-03-13 RX ORDER — LEVETIRACETAM 100 MG/ML
500 SOLUTION ORAL 2 TIMES DAILY
Status: DISCONTINUED | OUTPATIENT
Start: 2025-03-13 | End: 2025-03-14

## 2025-03-13 RX ORDER — DIAZEPAM 5 MG/1
5 TABLET ORAL EVERY 12 HOURS
Status: DISCONTINUED | OUTPATIENT
Start: 2025-03-13 | End: 2025-03-14

## 2025-03-13 RX ADMIN — DILTIAZEM HYDROCHLORIDE 60 MG: 60 TABLET ORAL at 15:07

## 2025-03-13 RX ADMIN — ALBUTEROL SULFATE 2.5 MG: 2.5 SOLUTION RESPIRATORY (INHALATION) at 07:22

## 2025-03-13 RX ADMIN — ENOXAPARIN SODIUM 40 MG: 100 INJECTION SUBCUTANEOUS at 09:14

## 2025-03-13 RX ADMIN — Medication 100 MG: at 09:14

## 2025-03-13 RX ADMIN — Medication 4 ML: at 20:19

## 2025-03-13 RX ADMIN — QUETIAPINE FUMARATE 50 MG: 25 TABLET ORAL at 09:14

## 2025-03-13 RX ADMIN — Medication 5000 UNITS: at 09:14

## 2025-03-13 RX ADMIN — ALBUTEROL SULFATE 2.5 MG: 2.5 SOLUTION RESPIRATORY (INHALATION) at 14:37

## 2025-03-13 RX ADMIN — PANTOPRAZOLE SODIUM 40 MG: 40 INJECTION, POWDER, LYOPHILIZED, FOR SOLUTION INTRAVENOUS at 09:14

## 2025-03-13 RX ADMIN — LACTULOSE 30 G: 10 SOLUTION ORAL at 15:07

## 2025-03-13 RX ADMIN — DILTIAZEM HYDROCHLORIDE 60 MG: 60 TABLET ORAL at 06:16

## 2025-03-13 RX ADMIN — DULOXETINE HYDROCHLORIDE 30 MG: 30 CAPSULE, DELAYED RELEASE ORAL at 09:14

## 2025-03-13 RX ADMIN — RIFAXIMIN 550 MG: 550 TABLET ORAL at 20:52

## 2025-03-13 RX ADMIN — ATORVASTATIN CALCIUM 40 MG: 40 TABLET, FILM COATED ORAL at 09:14

## 2025-03-13 RX ADMIN — ALLOPURINOL 300 MG: 300 TABLET ORAL at 09:14

## 2025-03-13 RX ADMIN — GABAPENTIN 200 MG: 250 SOLUTION ORAL at 15:07

## 2025-03-13 RX ADMIN — ZONISAMIDE 50 MG: 25 CAPSULE ORAL at 09:17

## 2025-03-13 RX ADMIN — SODIUM CHLORIDE, PRESERVATIVE FREE 10 ML: 5 INJECTION INTRAVENOUS at 20:57

## 2025-03-13 RX ADMIN — ALBUTEROL SULFATE 2.5 MG: 2.5 SOLUTION RESPIRATORY (INHALATION) at 20:18

## 2025-03-13 RX ADMIN — Medication 750 MG: at 09:16

## 2025-03-13 RX ADMIN — CARBOXYMETHYLCELLULOSE SODIUM 1 DROP: 10 GEL OPHTHALMIC at 15:21

## 2025-03-13 RX ADMIN — METOPROLOL TARTRATE 50 MG: 50 TABLET, FILM COATED ORAL at 09:14

## 2025-03-13 RX ADMIN — SODIUM CHLORIDE, PRESERVATIVE FREE 10 ML: 5 INJECTION INTRAVENOUS at 09:15

## 2025-03-13 RX ADMIN — ZONISAMIDE 50 MG: 25 CAPSULE ORAL at 20:57

## 2025-03-13 RX ADMIN — RIFAXIMIN 550 MG: 550 TABLET ORAL at 09:14

## 2025-03-13 RX ADMIN — QUETIAPINE FUMARATE 50 MG: 25 TABLET ORAL at 20:52

## 2025-03-13 RX ADMIN — OXYCODONE 5 MG: 5 TABLET ORAL at 18:20

## 2025-03-13 RX ADMIN — ALBUTEROL SULFATE 2.5 MG: 2.5 SOLUTION RESPIRATORY (INHALATION) at 11:22

## 2025-03-13 RX ADMIN — OXYCODONE 5 MG: 5 TABLET ORAL at 10:03

## 2025-03-13 RX ADMIN — DILTIAZEM HYDROCHLORIDE 60 MG: 60 TABLET ORAL at 22:37

## 2025-03-13 RX ADMIN — GABAPENTIN 200 MG: 250 SOLUTION ORAL at 20:52

## 2025-03-13 RX ADMIN — GABAPENTIN 200 MG: 250 SOLUTION ORAL at 04:04

## 2025-03-13 RX ADMIN — OXYCODONE 5 MG: 5 TABLET ORAL at 01:45

## 2025-03-13 RX ADMIN — Medication 500 MG: at 20:52

## 2025-03-13 RX ADMIN — LACTULOSE 30 G: 10 SOLUTION ORAL at 09:14

## 2025-03-13 RX ADMIN — LACTULOSE 30 G: 10 SOLUTION ORAL at 20:52

## 2025-03-13 RX ADMIN — GABAPENTIN 200 MG: 250 SOLUTION ORAL at 09:14

## 2025-03-13 RX ADMIN — METOPROLOL TARTRATE 50 MG: 50 TABLET, FILM COATED ORAL at 20:52

## 2025-03-13 ASSESSMENT — PAIN SCALES - WONG BAKER: WONGBAKER_NUMERICALRESPONSE: NO HURT

## 2025-03-13 ASSESSMENT — PAIN DESCRIPTION - DESCRIPTORS: DESCRIPTORS: ACHING

## 2025-03-13 ASSESSMENT — PAIN DESCRIPTION - LOCATION: LOCATION: BACK

## 2025-03-13 ASSESSMENT — PAIN DESCRIPTION - ORIENTATION: ORIENTATION: RIGHT

## 2025-03-13 ASSESSMENT — PAIN SCALES - GENERAL: PAINLEVEL_OUTOF10: 3

## 2025-03-13 NOTE — PROGRESS NOTES
Reassessment completed (see Flowsheet )  Pt  in bed with BIPAP  in place noted pt pulling and picking at line sitter at bedside redirecting pt . All ICU lines and monitoring  in place .

## 2025-03-13 NOTE — PROGRESS NOTES
4 Eyes Skin Assessment     NAME:  Sandor Early  YOB: 1955  MEDICAL RECORD NUMBER:  [id number]    The patient is being assessed for  Other shift assessment     I agree that at least one RN has performed a thorough Head to Toe Skin Assessment on the patient. ALL assessment sites listed below have been assessed.      Areas assessed by both nurses:    Head, Face, Ears, Shoulders, Back, Chest, Arms, Elbows, Hands, Sacrum. Buttock, Coccyx, Ischium, and Legs. Feet and Heels        Does the Patient have a Wound? No noted wound(s) bruises scattered        Abraham Prevention initiated by RN: Yes  Wound Care Orders initiated by RN: No    Pressure Injury (Stage 3,4, Unstageable, DTI, NWPT, and Complex wounds) if present, place Wound referral order by RN under : No    New Ostomies, if present place, Ostomy referral order under : No     Nurse 1 eSignature: Electronically signed by Charissa Daugherty RN on 3/13/25 at 6:50 AM EDT    **SHARE this note so that the co-signing nurse can place an eSignature**    Nurse 2 eSignature: Electronically signed by Bernardo De Leon RN on 3/13/25 at 6:59 AM EDT

## 2025-03-13 NOTE — PROGRESS NOTES
03/13/25 0726   NIV Type   Mode Bilevel   Mask Type Full face mask   Mask Size Extra large   Assessment   Respirations 18   SpO2 99 %   Settings/Measurements   IPAP 15 cmH20   CPAP/EPAP 5 cmH2O   Vt (Measured) 519 mL   Rate Ordered 18   FiO2  55 %

## 2025-03-13 NOTE — PROGRESS NOTES
03/12/25 2336   NIV Type   NIV Started/Stopped (S)  On   Equipment Type V60   Mode Bilevel   Mask Type Full face mask   Mask Size Extra large   Assessment   Pulse 100   Respirations 20   SpO2 97 %   Mask Compliance Good   Skin Assessment Clean, dry, & intact   Skin Protection for O2 Device Yes   Settings/Measurements   IPAP 15 cmH20   CPAP/EPAP 5 cmH2O   Vt (Measured) 711 mL   Rate Ordered 18   FiO2  55 %   Minute Volume (L/min) 14.1 Liters   Mask Leak (lpm) 51 lpm   Patient's Home Machine No   Alarm Settings   Alarms On Y

## 2025-03-13 NOTE — PROGRESS NOTES
Physical Therapy/Occupational Therapy    Attempted PT/OT follow-up treatment. Patient is with another provider. Will re-attempt as able.     Anaid Adams, PT, DPT  Reyna Andre, OTR/L

## 2025-03-13 NOTE — PROGRESS NOTES
Speech Language Pathology  Attempt Note     Name: Sandor Early  : 1955  Medical Diagnosis: Hypomagnesemia [E83.42]  Influenza A [J10.1]  COPD exacerbation (HCC) [J44.1]  Respiratory failure with hypoxia (HCC) [J96.91]  Alcohol use disorder [F10.90]  Acute on chronic respiratory failure with hypoxia and hypercapnia (HCC) [J96.21, J96.22]  Acute respiratory failure (HCC) [J96.00]      SLP attempted to see pt for dysphagia tx. SLP spoke with Krystina/CONSTANTINO re: pt status. Oral care is being completed regularly with pt expectorating large chunk of dried mucus per report. Rn reports minimal gag reflex noted during suctioning. Pt now sleeping after given pain meds. SLP to re-attempt as pt's condition and schedule allows. Rn suggests attempting pt in a.m. when he appears better able to maintain alertness. RN aware. No charges.    Thank you,  Tayla Shelby M.A. SLP  Speech-Language Pathologist  [place] Lic #[id number]  [phone]

## 2025-03-13 NOTE — PROGRESS NOTES
Pulmonary & Critical Care Medicine ICU Progress Note    CC: COPD respiratory failure    Events of Last 24 hours:  BiPAP 15/5 45%   Restless today   No drips   7---> 5 L  Off restraint this am has sitter   Secretions with NTS - a lot, better       Vascular lines: IV: Left IJ 2/14-2/26. R Midline 2/25    MV: 2/14-3/4/25  Vent Mode: AC/VC Resp Rate (Set): 0 bpm/Vt (Set, mL): 0 mL/ /FiO2 : 55 %  No results for input(s): \"PHART\", \"LAD2NOS\", \"PO2ART\" in the last 72 hours.        IV:   sodium chloride      sodium chloride      dextrose         Vitals:  Blood pressure (!) 147/69, pulse (!) 107, temperature 98.3 °F (36.8 °C), temperature source Bladder, resp. rate 21, height 1.702 m (5' 7.01\"), weight 81 kg (178 lb 9.2 oz), SpO2 99%.  on 5 L    EXAM:  General: ill appearing.    Eyes: No sclera icterus. No conjunctival injection.  ENT: No discharge.   Neck: Trachea midline.   Resp: No accessory muscle use. Few crackles. No wheezing. + rhonchi.   CV: Regular  rate. Regular rhythm. No mumur or rub. No edema.   GI: Non-tender. Non-distended.   Skin: Warm and dry. No rash on exposed extremities.  M/S: No cyanosis. No clubbing.   Neuro: + following commands.  Good cough.   Psych: Alert awake. Oriented x 1. Mild agiation. Alert to name        Scheduled Meds:   QUEtiapine  50 mg Oral BID    thiamine  100 mg Oral Daily    rifAXIMin  550 mg Oral BID    lactulose  30 g Oral TID    levETIRAcetam  750 mg Oral BID    zonisamide  50 mg Oral BID    Gabapentin  200 mg Per NG tube Q6H    DULoxetine  30 mg Oral Daily    cloNIDine  1 patch TransDERmal Weekly    CENTRUM/CERTA-KAYKAY with minerals oral  15 mL Oral Daily    dilTIAZem  60 mg Oral 3 times per day    metoprolol tartrate  50 mg Oral BID    polyethylene glycol  17 g Oral Daily    sodium chloride flush  5-40 mL IntraVENous 2 times per day    insulin lispro  0-16 Units SubCUTAneous Q4H    albuterol  2.5 mg Nebulization 4x Daily RT    sodium chloride flush  5-40 mL IntraVENous 2 times per  day    pantoprazole (PROTONIX) 40 mg in sodium chloride (PF) 0.9 % 10 mL injection  40 mg IntraVENous Daily    enoxaparin  40 mg SubCUTAneous Daily    allopurinol  300 mg Oral Daily    vitamin D3  5,000 Units Oral Daily    atorvastatin  40 mg Oral Daily       Data:  CBC:   Recent Labs     03/11/25  0707 03/12/25  1045   WBC 9.4 9.8   HGB 9.5* 10.5*   HCT 28.8* 33.0*   MCV 88.4 89.1    451*     BMP:   Recent Labs     03/11/25  0756 03/12/25  0500 03/13/25  0513    139 135*   K 4.0 4.2 4.4   CL 97* 96* 95*   CO2 36* 37* 32   BUN 11 10 11   CREATININE 0.5* 0.5* 0.6*     LIVER PROFILE:   No results for input(s): \"AST\", \"ALT\", \"LIPASE\", \"AMYLASE\", \"BILIDIR\", \"BILITOT\", \"ALKPHOS\" in the last 72 hours.    Invalid input(s): \"ALB\"      Microbiology:  1/20 Flu DETECTED  2/5 Strep and leg negative   2/13 BC NGTD  2/13 respiratory  MRSA    2/13 MRSA DNA probe DETECTED  2/21 BAL NGTD      Imaging:  CXR 3/4 images independently reviewed by me and showed:  Slightly improved perihilar opacities centrally.         ASSESSMENT:  Acute hypoxemic hypercapnic respiratory failure   Agitation/restlessness  Acute encephalopathy/Metabolic encephalopathy   Tracheobronchitis with possible PNA - MRSA    RUE edema- SVT  COPD   Alcohol withdrawal with Delirium tremens  Ileus/Enteritis   Chronic hypoxia on 4 L O2  Flu A + 1/30  Tobacco abuse  ROCKWELL cirrhosis  Chronic diastolic dysfunction  DM2  PVD status post aortoiliac bpg  Patient has had several episodes of seizure-like activity   Alcohol use/abuse 30 beer/day        PLAN:  Supplemental oxygen to maintain SaO2 >92%; wean as tolerated  BIPAP QHS and PRN during the day   Inhaled bronchodilators  Metanebs and prn nt suctioning  Increase Seroquel 50 PO BID- QTc 442 ---> 438   Add Valium 5 mg PO Q12 hrs   Completed Vanc D#10/10, Zosyn D#8 and Diflucan D#7, Tamiflu D#5 and Zpak  Keppra started by neurology- Seizure and fall precautions  Neurontin BID   Cardizem, Metoprolol 50mg BID,

## 2025-03-13 NOTE — PROGRESS NOTES
Reassessment completed (see Flowsheet). All ICU lines remain intact, ICU monitoring continued-     Infusing:  N/A    Pt is drowsy. / telesitter at bedside.   Pt remains on 5L HFNC  Lung sounds rhonchi.   Pt's blood pressures WDL.

## 2025-03-13 NOTE — PROGRESS NOTES
03/13/25 0327   NIV Type   Equipment Type V60   Mode Bilevel   Mask Type Full face mask   Mask Size Extra large   Assessment   Pulse 91   Respirations 19   SpO2 99 %   Settings/Measurements   IPAP 15 cmH20   CPAP/EPAP 5 cmH2O   Vt (Measured) 796 mL   Rate Ordered 18   FiO2  55 %   Minute Volume (L/min) 15.4 Liters   Mask Leak (lpm) 59 lpm   Patient's Home Machine No   Alarm Settings   Alarms On Y

## 2025-03-13 NOTE — PROGRESS NOTES
Progress Note    Admit Date:  1/30/2025    Interval history    Admitted for flu, copd exacerbation, course complicated with development of sbo, resp failure needing vent support     Tolerated pressure support 5/5 for 6 hours yesterday.  Plans for spontaneous breathing trial and possible extubation today.  On Precedex.    3/5-extubated on 3/4/2025.  Placed on BiPAP.  Has been intermittently confused.  Was on Precedex.  Has had some intermittent agitation.  When I saw the patient his eyes are open.   at bedside.  He has a good cough.    3/6- more calm then yesterday. On Bipap. Failed speech eval but they will see him again. Responding nicely to verbal commands,    3/7- more calm having thick secretions. ST has recommended MBS. He is NPO. Used Bipap last night. BP elevated today.    3/9- MR singer seen awake, but confused, restless was given seroquel for agitation. Confused and not directable only for few minutes and gets restless, throwing stuff  Was on precedex overnight but now off    3/10  Continues to be disoriented   Recd haldol yesterday  Now in restraints  Used BiPAP at night   Now on 5 L     3/11  Disoriented   Off restraints now   biPAP for 4-5 hours last night   On 5 L now     3/12   Continues to be confused   On BiPAP at night   5 L now       3/13  On BiPAP  Restless,disoriented   Objective:   Patient Vitals for the past 4 hrs:   BP Temp Temp src Pulse Resp SpO2 Weight   03/13/25 0726 -- -- -- -- 18 99 % --   03/13/25 0722 -- -- -- (!) 107 18 98 % --   03/13/25 0700 121/65 99 °F (37.2 °C) Bladder (!) 107 18 99 % --   03/13/25 0616 (!) 147/69 -- -- (!) 107 -- -- --   03/13/25 0600 (!) 150/72 -- -- (!) 101 21 -- --   03/13/25 0500 (!) 145/66 -- -- 95 19 100 % --   03/13/25 0423 -- -- -- -- -- -- 81 kg (178 lb 9.2 oz)   03/13/25 0400 (!) 145/71 98.6 °F (37 °C) Bladder 92 18 100 % --          Intake/Output Summary (Last 24 hours) at 3/13/2025 0743  Last data filed at 3/13/2025 0616  Gross per 24  hour   Intake 2059 ml   Output 1963 ml   Net 96 ml       Physical Exam:  General: elderly male ill appearing , confused in bed  awake  Mucous Membranes:  Pink , anicteric  Neck: No JVD, no carotid bruit, no thyromegaly  NG tube noted   Chest diminished in bases  No accessory muscle use.  Minimal wheezing.  Few rhonchi.  Cardiovascular:  RRR S1S2 heard, no murmurs or gallops  Abdomen:  Soft, obese un distended, non tender, no organomegaly, BS absent  Extremities: trace edema to ext improved   Distal pulses well felt  Neurological awake and alert. Moves upper ext with ease but not lower ext. Confused with gen weakness    cations:  QUEtiapine, 50 mg, BID  thiamine, 100 mg, Daily  rifAXIMin, 550 mg, BID  lactulose, 30 g, TID  levETIRAcetam, 750 mg, BID  zonisamide, 50 mg, BID  Gabapentin, 200 mg, Q6H  DULoxetine, 30 mg, Daily  cloNIDine, 1 patch, Weekly  CENTRUM/CERTA-KAYKAY with minerals oral, 15 mL, Daily  dilTIAZem, 60 mg, 3 times per day  metoprolol tartrate, 50 mg, BID  polyethylene glycol, 17 g, Daily  sodium chloride flush, 5-40 mL, 2 times per day  insulin lispro, 0-16 Units, Q4H  albuterol, 2.5 mg, 4x Daily RT  sodium chloride flush, 5-40 mL, 2 times per day  pantoprazole (PROTONIX) 40 mg in sodium chloride (PF) 0.9 % 10 mL injection, 40 mg, Daily  enoxaparin, 40 mg, Daily  allopurinol, 300 mg, Daily  vitamin D3, 5,000 Units, Daily  atorvastatin, 40 mg, Daily      PRN Medications:  hydrALAZINE, 10 mg, Q6H PRN  oxyCODONE, 5 mg, Q8H PRN  potassium chloride, 40 mEq, PRN   Or  potassium alternative oral replacement, 40 mEq, PRN  prochlorperazine, 10 mg, Q8H PRN  albuterol, 2.5 mg, Q4H PRN  carboxymethylcellulose PF, 1 drop, PRN  sodium chloride flush, 5-40 mL, PRN  sodium chloride, , PRN  sodium chloride flush, 5-40 mL, PRN  diatrizoate meglumine-sodium, 12 mL, ONCE PRN  guaiFENesin, 200 mg, Q4H PRN  benzocaine, , 4x Daily PRN  phenol, 1 spray, Q2H PRN  sodium chloride, , PRN  acetaminophen, 650 mg, Q6H PRN

## 2025-03-13 NOTE — PROGRESS NOTES
Pt resting in bed eyes closed resp 96 % on 6 L HFNC . NG bridled in place at 64 with Diabetic tube feed infusing at 60 ml/hr. Castrejon in place and draining yellow urine.bed alarm on call light in reach.   Patient is not able to demonstrate the ability to move from a reclining position to an upright position within the recliner due to weak .

## 2025-03-13 NOTE — PROGRESS NOTES
Shift handoff report given to Charissa MEEKS at bedside.   Pt is Awake and alert with  at bedside and telesitter.   IV handoff completed.     Call light within reach, bed in lowest position, bed alarm on. End of shift checks completed.    Pt has been free of falls for duration of shift. .

## 2025-03-13 NOTE — PROGRESS NOTES
4 Eyes Skin Assessment     NAME:  Sandor Early  YOB: 1955  MEDICAL RECORD NUMBER:  [id number]    The patient is being assessed for  Other shift assessment     I agree that at least one RN has performed a thorough Head to Toe Skin Assessment on the patient. ALL assessment sites listed below have been assessed.      Areas assessed by both nurses:    Head, Face, Ears, Shoulders, Back, Chest, Arms, Elbows, Hands, Sacrum. Buttock, Coccyx, Ischium, and Legs. Feet and Heels        Does the Patient have a Wound? No noted wound(s)     Bruises scattered   Abraham Prevention initiated by RN: Yes  Wound Care Orders initiated by RN: No    Pressure Injury (Stage 3,4, Unstageable, DTI, NWPT, and Complex wounds) if present, place Wound referral order by RN under : No    New Ostomies, if present place, Ostomy referral order under : No     Nurse 1 eSignature: Electronically signed by Charissa Daugherty RN on 3/13/25 at 6:54 AM EDT    **SHARE this note so that the co-signing nurse can place an eSignature**    Nurse 2 eSignature: Electronically signed by Bernardo De Leon RN on 3/13/25 at 6:59 AM EDT

## 2025-03-13 NOTE — PROGRESS NOTES
Reassessment completed by other RN (See FLOWSHEET).    Pt's son at bedside and updated on POC, and potential transfer to [facility] if precert is \"completed\". Family is agreeable to [facility] per Son.      at bedside.

## 2025-03-13 NOTE — PROGRESS NOTES
Shift assessment completed (see flow sheet). Pt is A/O to self, confused, restless, and follows commands. Telesitter at bedside. Respirations are even, labored, with rhonchi throughout. Pt on 6L O2 via HFNC. Call light within reach, bed in lowest position with bed alarm on.

## 2025-03-14 LAB
ANION GAP SERPL CALCULATED.3IONS-SCNC: 6 MMOL/L (ref 3–16)
ANISOCYTOSIS BLD QL SMEAR: ABNORMAL
BASOPHILS # BLD: 0.1 K/UL (ref 0–0.2)
BASOPHILS NFR BLD: 1 %
BUN SERPL-MCNC: 11 MG/DL (ref 7–20)
CALCIUM SERPL-MCNC: 9.2 MG/DL (ref 8.3–10.6)
CHLORIDE SERPL-SCNC: 94 MMOL/L (ref 99–110)
CO2 SERPL-SCNC: 36 MMOL/L (ref 21–32)
CREAT SERPL-MCNC: 0.6 MG/DL (ref 0.8–1.3)
DEPRECATED RDW RBC AUTO: 17 % (ref 12.4–15.4)
EOSINOPHIL # BLD: 0.2 K/UL (ref 0–0.6)
EOSINOPHIL NFR BLD: 2 %
GFR SERPLBLD CREATININE-BSD FMLA CKD-EPI: >90 ML/MIN/{1.73_M2}
GLUCOSE BLD-MCNC: 124 MG/DL (ref 70–99)
GLUCOSE BLD-MCNC: 124 MG/DL (ref 70–99)
GLUCOSE BLD-MCNC: 128 MG/DL (ref 70–99)
GLUCOSE BLD-MCNC: 134 MG/DL (ref 70–99)
GLUCOSE SERPL-MCNC: 124 MG/DL (ref 70–99)
HCT VFR BLD AUTO: 29.7 % (ref 40.5–52.5)
HGB BLD-MCNC: 9.8 G/DL (ref 13.5–17.5)
LYMPHOCYTES # BLD: 1.9 K/UL (ref 1–5.1)
LYMPHOCYTES NFR BLD: 22 %
MCH RBC QN AUTO: 29 PG (ref 26–34)
MCHC RBC AUTO-ENTMCNC: 32.9 G/DL (ref 31–36)
MCV RBC AUTO: 88.2 FL (ref 80–100)
MONOCYTES # BLD: 0.8 K/UL (ref 0–1.3)
MONOCYTES NFR BLD: 9 %
MYELOCYTES NFR BLD MANUAL: 1 %
NEUTROPHILS # BLD: 5.6 K/UL (ref 1.7–7.7)
NEUTROPHILS NFR BLD: 64 %
NEUTS BAND NFR BLD MANUAL: 1 % (ref 0–7)
PERFORMED ON: ABNORMAL
PLATELET # BLD AUTO: 372 K/UL (ref 135–450)
PLATELET BLD QL SMEAR: ADEQUATE
PMV BLD AUTO: 7.8 FL (ref 5–10.5)
POIKILOCYTOSIS BLD QL SMEAR: ABNORMAL
POLYCHROMASIA BLD QL SMEAR: ABNORMAL
POTASSIUM SERPL-SCNC: 3.9 MMOL/L (ref 3.5–5.1)
RBC # BLD AUTO: 3.37 M/UL (ref 4.2–5.9)
SLIDE REVIEW: ABNORMAL
SODIUM SERPL-SCNC: 136 MMOL/L (ref 136–145)
STOMATOCYTES BLD QL SMEAR: ABNORMAL
WBC # BLD AUTO: 8.5 K/UL (ref 4–11)

## 2025-03-14 PROCEDURE — 2000000000 HC ICU R&B

## 2025-03-14 PROCEDURE — 6360000002 HC RX W HCPCS: Performed by: INTERNAL MEDICINE

## 2025-03-14 PROCEDURE — 6370000000 HC RX 637 (ALT 250 FOR IP): Performed by: INTERNAL MEDICINE

## 2025-03-14 PROCEDURE — 92526 ORAL FUNCTION THERAPY: CPT

## 2025-03-14 PROCEDURE — 94660 CPAP INITIATION&MGMT: CPT

## 2025-03-14 PROCEDURE — 99232 SBSQ HOSP IP/OBS MODERATE 35: CPT | Performed by: INTERNAL MEDICINE

## 2025-03-14 PROCEDURE — 94669 MECHANICAL CHEST WALL OSCILL: CPT

## 2025-03-14 PROCEDURE — 2580000003 HC RX 258: Performed by: INTERNAL MEDICINE

## 2025-03-14 PROCEDURE — 99233 SBSQ HOSP IP/OBS HIGH 50: CPT | Performed by: INTERNAL MEDICINE

## 2025-03-14 PROCEDURE — 85025 COMPLETE CBC W/AUTO DIFF WBC: CPT

## 2025-03-14 PROCEDURE — 2500000003 HC RX 250 WO HCPCS: Performed by: STUDENT IN AN ORGANIZED HEALTH CARE EDUCATION/TRAINING PROGRAM

## 2025-03-14 PROCEDURE — 80048 BASIC METABOLIC PNL TOTAL CA: CPT

## 2025-03-14 PROCEDURE — 36415 COLL VENOUS BLD VENIPUNCTURE: CPT

## 2025-03-14 PROCEDURE — 2500000003 HC RX 250 WO HCPCS: Performed by: INTERNAL MEDICINE

## 2025-03-14 PROCEDURE — 2700000000 HC OXYGEN THERAPY PER DAY

## 2025-03-14 PROCEDURE — 6370000000 HC RX 637 (ALT 250 FOR IP): Performed by: NEUROMUSCULOSKELETAL MEDICINE & OMM

## 2025-03-14 PROCEDURE — 94761 N-INVAS EAR/PLS OXIMETRY MLT: CPT

## 2025-03-14 PROCEDURE — 6370000000 HC RX 637 (ALT 250 FOR IP): Performed by: STUDENT IN AN ORGANIZED HEALTH CARE EDUCATION/TRAINING PROGRAM

## 2025-03-14 PROCEDURE — 94640 AIRWAY INHALATION TREATMENT: CPT

## 2025-03-14 RX ORDER — DIAZEPAM 5 MG/1
5 TABLET ORAL EVERY 8 HOURS PRN
Status: DISCONTINUED | OUTPATIENT
Start: 2025-03-14 | End: 2025-03-16 | Stop reason: SINTOL

## 2025-03-14 RX ORDER — LEVETIRACETAM 100 MG/ML
250 SOLUTION ORAL 2 TIMES DAILY
Status: DISCONTINUED | OUTPATIENT
Start: 2025-03-14 | End: 2025-03-15

## 2025-03-14 RX ADMIN — SODIUM CHLORIDE, PRESERVATIVE FREE 10 ML: 5 INJECTION INTRAVENOUS at 08:42

## 2025-03-14 RX ADMIN — ALLOPURINOL 300 MG: 300 TABLET ORAL at 08:40

## 2025-03-14 RX ADMIN — ATORVASTATIN CALCIUM 40 MG: 40 TABLET, FILM COATED ORAL at 08:40

## 2025-03-14 RX ADMIN — CARBOXYMETHYLCELLULOSE SODIUM 1 DROP: 10 GEL OPHTHALMIC at 05:55

## 2025-03-14 RX ADMIN — Medication 4 ML: at 20:05

## 2025-03-14 RX ADMIN — METOPROLOL TARTRATE 50 MG: 50 TABLET, FILM COATED ORAL at 08:40

## 2025-03-14 RX ADMIN — ALBUTEROL SULFATE 2.5 MG: 2.5 SOLUTION RESPIRATORY (INHALATION) at 07:52

## 2025-03-14 RX ADMIN — PANTOPRAZOLE SODIUM 40 MG: 40 INJECTION, POWDER, LYOPHILIZED, FOR SOLUTION INTRAVENOUS at 08:41

## 2025-03-14 RX ADMIN — GABAPENTIN 200 MG: 250 SOLUTION ORAL at 08:40

## 2025-03-14 RX ADMIN — Medication 500 MG: at 08:41

## 2025-03-14 RX ADMIN — QUETIAPINE FUMARATE 50 MG: 25 TABLET ORAL at 21:28

## 2025-03-14 RX ADMIN — ENOXAPARIN SODIUM 40 MG: 100 INJECTION SUBCUTANEOUS at 08:41

## 2025-03-14 RX ADMIN — DIAZEPAM 5 MG: 5 TABLET ORAL at 16:11

## 2025-03-14 RX ADMIN — METOPROLOL TARTRATE 50 MG: 50 TABLET, FILM COATED ORAL at 21:28

## 2025-03-14 RX ADMIN — DILTIAZEM HYDROCHLORIDE 60 MG: 60 TABLET ORAL at 21:28

## 2025-03-14 RX ADMIN — DIAZEPAM 5 MG: 5 TABLET ORAL at 05:48

## 2025-03-14 RX ADMIN — LACTULOSE 30 G: 10 SOLUTION ORAL at 08:43

## 2025-03-14 RX ADMIN — DILTIAZEM HYDROCHLORIDE 60 MG: 60 TABLET ORAL at 05:49

## 2025-03-14 RX ADMIN — Medication 5000 UNITS: at 08:41

## 2025-03-14 RX ADMIN — DILTIAZEM HYDROCHLORIDE 60 MG: 60 TABLET ORAL at 14:15

## 2025-03-14 RX ADMIN — DIAZEPAM 5 MG: 5 TABLET ORAL at 23:55

## 2025-03-14 RX ADMIN — RIFAXIMIN 550 MG: 550 TABLET ORAL at 21:28

## 2025-03-14 RX ADMIN — SODIUM CHLORIDE, PRESERVATIVE FREE 10 ML: 5 INJECTION INTRAVENOUS at 21:44

## 2025-03-14 RX ADMIN — ALBUTEROL SULFATE 2.5 MG: 2.5 SOLUTION RESPIRATORY (INHALATION) at 20:07

## 2025-03-14 RX ADMIN — GABAPENTIN 200 MG: 250 SOLUTION ORAL at 14:15

## 2025-03-14 RX ADMIN — QUETIAPINE FUMARATE 50 MG: 25 TABLET ORAL at 08:41

## 2025-03-14 RX ADMIN — DULOXETINE HYDROCHLORIDE 30 MG: 30 CAPSULE, DELAYED RELEASE ORAL at 08:40

## 2025-03-14 RX ADMIN — LACTULOSE 30 G: 10 SOLUTION ORAL at 14:15

## 2025-03-14 RX ADMIN — ALBUTEROL SULFATE 2.5 MG: 2.5 SOLUTION RESPIRATORY (INHALATION) at 15:47

## 2025-03-14 RX ADMIN — Medication 100 MG: at 08:41

## 2025-03-14 RX ADMIN — GABAPENTIN 200 MG: 250 SOLUTION ORAL at 02:36

## 2025-03-14 RX ADMIN — SODIUM CHLORIDE, PRESERVATIVE FREE 10 ML: 5 INJECTION INTRAVENOUS at 21:42

## 2025-03-14 RX ADMIN — LACTULOSE 30 G: 10 SOLUTION ORAL at 21:27

## 2025-03-14 RX ADMIN — RIFAXIMIN 550 MG: 550 TABLET ORAL at 08:41

## 2025-03-14 RX ADMIN — GABAPENTIN 200 MG: 250 SOLUTION ORAL at 21:27

## 2025-03-14 RX ADMIN — Medication 4 ML: at 07:51

## 2025-03-14 RX ADMIN — Medication 250 MG: at 21:42

## 2025-03-14 RX ADMIN — ZONISAMIDE 50 MG: 25 CAPSULE ORAL at 21:42

## 2025-03-14 RX ADMIN — ZONISAMIDE 50 MG: 25 CAPSULE ORAL at 11:21

## 2025-03-14 RX ADMIN — ALBUTEROL SULFATE 2.5 MG: 2.5 SOLUTION RESPIRATORY (INHALATION) at 12:46

## 2025-03-14 RX ADMIN — Medication 15 ML: at 08:40

## 2025-03-14 NOTE — PROGRESS NOTES
Comprehensive Nutrition Assessment    Type and Reason for Visit:  Reassess    Nutrition Recommendations/Plan:   Continue TF regimen - ADULT TUBE FEEDING; Nasogastric tube; Diabetic formula - Glucerna 1.5 with a goal rate of 60 ml/hr x 20 hours + 120 ml water flushes every 3 hours for tube patency and hydration.   Monitor TF rate, intake, and tolerance + water flushes.   Monitor mental status and respiratory status.   Monitor nutrition-related labs, bowel function, and weight trends.      Malnutrition Assessment:  Malnutrition Status:  Severe malnutrition (03/14/25 1415)    Context:  Acute Illness     Findings of the 6 clinical characteristics of malnutrition:  Energy Intake:  50% or less of estimated energy requirements for 5 or more days  Weight Loss:  Greater than 7.5% over 3 months (-16# or 8.4% weight loss since 1/31/25)     Body Fat Loss:  Moderate body fat loss Orbital   Muscle Mass Loss:  Moderate muscle mass loss Clavicles (pectoralis & deltoids), Calf (gastrocnemius), Hand (interosseous)  Fluid Accumulation:  No fluid accumulation Generalized (generalized +1 pitting edema)   Strength:  Not Performed    Nutrition Assessment:    patient remains unchanged from a nutritional standpoint since last RD assessment; patient remains at risk for further compromise d/t need for EN as sole source of nutrition, patient remains confused, and inability to pass swallow evaluation with SLP; will continue Glucerna 1.5 with a goal rate of 60 ml/hr x 20 hours + 120 ml water flushes every 3 hours for tube patency    Nutrition Related Findings:    patient is A & O x 2; patient remains confused and was trying to get out of bed this am prior to rounds; on high-dose SSI; patient is receiving TF at goal rate without issue; + BM on 3/14/25; + PT/OT ordered; per SLP, continue NPO status, after evaluation today Wound Type: None       Current Nutrition Intake & Therapies:    Average Meal Intake: NPO  Average Supplements Intake:

## 2025-03-14 NOTE — PROGRESS NOTES
Pulmonary & Critical Care Medicine ICU Progress Note    CC: COPD respiratory failure    Events of Last 24 hours:  BiPAP 15/5 50%   7---> 5--> 4 L  Secretions with NTS - better       Vascular lines: IV: Left IJ 2/14-2/26. R Midline 2/25    MV: 2/14-3/4/25  Vent Mode: AC/VC Resp Rate (Set): 0 bpm/Vt (Set, mL): 0 mL/ /FiO2 : 50 %  No results for input(s): \"PHART\", \"HHS7YCI\", \"PO2ART\" in the last 72 hours.        IV:   sodium chloride      sodium chloride      dextrose         Vitals:  Blood pressure (!) 117/54, pulse 96, temperature 98.6 °F (37 °C), temperature source Bladder, resp. rate 18, height 1.702 m (5' 7.01\"), weight 79.2 kg (174 lb 9.7 oz), SpO2 100%.  on 4 L    EXAM:  General: ill appearing.    Eyes: No sclera icterus. No conjunctival injection.  ENT: No discharge.   Neck: Trachea midline.   Resp: No accessory muscle use. Few crackles. No wheezing. + rhonchi.   CV: Regular  rate. Regular rhythm. No mumur or rub. No edema.   GI: Non-tender. Non-distended.   Skin: Warm and dry. No rash on exposed extremities.  M/S: No cyanosis. No clubbing.   Neuro: + following commands.  Good cough.   Psych: Alert awake. Oriented x 1. Mild agiation. Alert to name        Scheduled Meds:   sodium chloride (Inhalant)  4 mL Nebulization BID RT    diazePAM  5 mg Oral Q12H    levETIRAcetam  500 mg Oral BID    QUEtiapine  50 mg Oral BID    thiamine  100 mg Oral Daily    rifAXIMin  550 mg Oral BID    lactulose  30 g Oral TID    zonisamide  50 mg Oral BID    Gabapentin  200 mg Per NG tube Q6H    DULoxetine  30 mg Oral Daily    cloNIDine  1 patch TransDERmal Weekly    CENTRUM/CERTA-KAYKAY with minerals oral  15 mL Oral Daily    dilTIAZem  60 mg Oral 3 times per day    metoprolol tartrate  50 mg Oral BID    polyethylene glycol  17 g Oral Daily    sodium chloride flush  5-40 mL IntraVENous 2 times per day    insulin lispro  0-16 Units SubCUTAneous Q4H    albuterol  2.5 mg Nebulization 4x Daily RT    sodium chloride flush  5-40 mL

## 2025-03-14 NOTE — PROGRESS NOTES
Reassessment completed, see flowsheets, unchanged from prior. VSS. Currently on 4L/NC.   Pt agitated, restless, pulling at lines lines and chavira.     BSWR in place for pt safety.       All ICU Lines and monitoring remain in place. Bed locked in lowest position. Call light within reach.

## 2025-03-14 NOTE — PROGRESS NOTES
Progress Note    Admit Date:  1/30/2025    Interval history    Admitted for flu, copd exacerbation, course complicated with development of sbo, resp failure needing vent support     Tolerated pressure support 5/5 for 6 hours yesterday.  Plans for spontaneous breathing trial and possible extubation today.  On Precedex.    3/5-extubated on 3/4/2025.  Placed on BiPAP.  Has been intermittently confused.  Was on Precedex.  Has had some intermittent agitation.  When I saw the patient his eyes are open.   at bedside.  He has a good cough.    3/6- more calm then yesterday. On Bipap. Failed speech eval but they will see him again. Responding nicely to verbal commands,    3/7- more calm having thick secretions. ST has recommended MBS. He is NPO. Used Bipap last night. BP elevated today.    3/9- MR singer seen awake, but confused, restless was given seroquel for agitation. Confused and not directable only for few minutes and gets restless, throwing stuff  Was on precedex overnight but now off    3/10  Continues to be disoriented   Recd haldol yesterday  Now in restraints  Used BiPAP at night   Now on 5 L     3/11  Disoriented   Off restraints now   biPAP for 4-5 hours last night   On 5 L now     3/12   Continues to be confused   On BiPAP at night   5 L now     3/13  On BiPAP  Restless,disoriented     3/14   On BIPAP all night  Now on 4 L   Confused   Objective:   Patient Vitals for the past 4 hrs:   BP Temp Temp src Pulse Resp SpO2 Weight   03/14/25 0700 (!) 117/54 98.6 °F (37 °C) Bladder 96 18 100 % --   03/14/25 0600 129/65 -- -- (!) 104 19 100 % --   03/14/25 0557 -- -- -- -- -- -- 79.2 kg (174 lb 9.7 oz)   03/14/25 0549 137/68 -- -- (!) 104 -- -- --   03/14/25 0500 125/81 -- -- -- -- 100 % --   03/14/25 0400 124/65 98.7 °F (37.1 °C) Bladder 97 18 99 % --          Intake/Output Summary (Last 24 hours) at 3/14/2025 0746  Last data filed at 3/14/2025 0600  Gross per 24 hour   Intake 1896 ml   Output 1710 ml

## 2025-03-14 NOTE — PROGRESS NOTES
RT Inhaler-Nebulizer Bronchodilator Protocol Note    There is a bronchodilator order in the chart from a provider indicating to follow the RT Bronchodilator Protocol and there is an “Initiate RT Inhaler-Nebulizer Bronchodilator Protocol” order as well (see protocol at bottom of note).    CXR Findings:  No results found.    The findings from the last RT Protocol Assessment were as follows:   History Pulmonary Disease: (P) Chronic pulmonary disease  Respiratory Pattern: (P) Dyspnea on exertion or RR 21-25 bpm  Breath Sounds: (P) Intermittent or unilateral wheezes  Cough: (P) Strong, productive  Indication for Bronchodilator Therapy: (P) Decreased or absent breath sounds  Bronchodilator Assessment Score: (P) 9    Aerosolized bronchodilator medication orders have been revised according to the RT Inhaler-Nebulizer Bronchodilator Protocol below.    Respiratory Therapist to perform RT Therapy Protocol Assessment initially then follow the protocol.  Repeat RT Therapy Protocol Assessment PRN for score 0-3 or on second treatment, BID, and PRN for scores above 3.    No Indications - adjust the frequency to every 6 hours PRN wheezing or bronchospasm, if no treatments needed after 48 hours then discontinue using Per Protocol order mode.     If indication present, adjust the RT bronchodilator orders based on the Bronchodilator Assessment Score as indicated below.  Use Inhaler orders unless patient has one or more of the following: on home nebulizer, not able to hold breath for 10 seconds, is not alert and oriented, cannot activate and use MDI correctly, or respiratory rate 25 breaths per minute or more, then use the equivalent nebulizer order(s) with same Frequency and PRN reasons based on the score.  If a patient is on this medication at home then do not decrease Frequency below that used at home.    0-3 - enter or revise RT bronchodilator order(s) to equivalent RT Bronchodilator order with Frequency of every 4 hours PRN for  wheezing or increased work of breathing using Per Protocol order mode.        4-6 - enter or revise RT Bronchodilator order(s) to two equivalent RT bronchodilator orders with one order with BID Frequency and one order with Frequency of every 4 hours PRN wheezing or increased work of breathing using Per Protocol order mode.        7-10 - enter or revise RT Bronchodilator order(s) to two equivalent RT bronchodilator orders with one order with TID Frequency and one order with Frequency of every 4 hours PRN wheezing or increased work of breathing using Per Protocol order mode.       11-13 - enter or revise RT Bronchodilator order(s) to one equivalent RT bronchodilator order with QID Frequency and an Albuterol order with Frequency of every 4 hours PRN wheezing or increased work of breathing using Per Protocol order mode.      Greater than 13 - enter or revise RT Bronchodilator order(s) to one equivalent RT bronchodilator order with every 4 hours Frequency and an Albuterol order with Frequency of every 2 hours PRN wheezing or increased work of breathing using Per Protocol order mode.       Electronically signed by Maicol Daley RCP on 3/14/2025 at 7:59 AM

## 2025-03-14 NOTE — PROGRESS NOTES
Speech Language Pathology  Swallowing Disorders and Dysphagia    Dysphagia Treatment/Follow-Up Note  Facility/Department: [facility] [place]    Recommendations: SLP recommends to advance to NPO except ice chips/small 5ML sips water via med cup with SLP/RN only (oral care must be completed prior) for comfort and to combat disuse atrophy; Meds via alt means of nutrition.   Continue to recommend instrumental swallow assessment prior to return to po intake when cognition/level of agitation/interest in po intake allow  Risk Management: upright for all intake, stay upright for at least 30 mins after intake, small bites/sips, total feed, 1:1 supervision with intake, oral care q4 hrs to reduce adverse affects in the event of aspiration, increase physical mobility as able, slow rate of intake, STRICT aspiration precautions, and hold PO and contact SLP if s/s of aspiration or worsening respiratory status develop.      NAME:Sandor Early  : 1955 (69 y.o.)   MRN: [id number]  ROOM: [facility]  ADMISSION DATE: 2025  PATIENT DIAGNOSIS(ES): Hypomagnesemia [E83.42]  Influenza A [J10.1]  COPD exacerbation (HCC) [J44.1]  Respiratory failure with hypoxia (HCC) [J96.91]  Alcohol use disorder [F10.90]  Acute on chronic respiratory failure with hypoxia and hypercapnia (HCC) [J96.21, J96.22]  Acute respiratory failure (HCC) [J96.00]  Allergies   Allergen Reactions    Lisinopril Swelling and Other (See Comments)    Ace Inhibitors Swelling and Other (See Comments)    Influenza Vaccines Other (See Comments)     He states he was hospitalized when he received his first flu vaccine    Tiotropium Bromide Monohydrate Swelling and Other (See Comments)     Tolerates both tudorza and incruse  On Trelegy.    Vilanterol        DATE ONSET: 2025    Pain: The patient does not complain of pain       Current Diet: Diet NPO  ADULT TUBE FEEDING; Nasogastric; Diabetic; Continuous; 20; Yes; 20; Q 4 hours; 60; 120; Q 3 hours      Dysphagia

## 2025-03-14 NOTE — PROGRESS NOTES
Shift assessment completed pt in bed eyes able to follow commands SPO2 ,100 %  on 5L HFNC. NG bridled in place at 64 with Diabetic tube feed infusing at 60 ml/hr. Castrejon in place and draining yellow urine.bed alarm on call light in reach. Patient is not able to demonstrate the ability to move from a reclining position to an upright position within the recliner due to weak .

## 2025-03-14 NOTE — PROGRESS NOTES
Reassessment completed, see flowsheets, unchanged from prior. VSS. Currently on 4L/NC.   Resting with eyes closes.      All ICU Lines and monitoring remain in place. Bed locked in lowest position. Call light within reach.

## 2025-03-14 NOTE — PROGRESS NOTES
Pt very restless and trying get up perfect serve sent to [facility] to give  valium 5 mg  now that pt did  not receive last night d/t resting.

## 2025-03-14 NOTE — PLAN OF CARE
Problem: Nutrition Deficit:  Goal: Optimize nutritional status  3/14/2025 1241 by Carol Ann Murillo RN  Outcome: Not Progressing     Problem: Confusion  Goal: Confusion, delirium, dementia, or psychosis is improved or at baseline  Description: INTERVENTIONS:  1. Assess for possible contributors to thought disturbance, including medications, impaired vision or hearing, underlying metabolic abnormalities, dehydration, psychiatric diagnoses, and notify attending LIP  2. [place] high risk fall precautions, as indicated  3. Provide frequent short contacts to provide reality reorientation, refocusing and direction  4. Decrease environmental stimuli, including noise as appropriate  5. Monitor and intervene to maintain adequate nutrition, hydration, elimination, sleep and activity  6. If unable to ensure safety without constant attention obtain sitter and review sitter guidelines with assigned personnel  7. Initiate Psychosocial CNS and Spiritual Care consult, as indicated  3/14/2025 1241 by Carol Ann Murillo RN  Outcome: Not Progressing    Problem: Safety - Adult  Goal: Free from fall injury  3/14/2025 1241 by Carol Ann Murillo RN  Outcome: Progressing     Problem: Respiratory - Adult  Goal: Achieves optimal ventilation and oxygenation  3/14/2025 1241 by Carol Ann Murillo RN  Outcome: Progressing     Problem: Skin/Tissue Integrity - Adult  Goal: Skin integrity remains intact  Description: 1.  Monitor for areas of redness and/or skin breakdown  2.  Assess vascular access sites hourly  3.  Every 4-6 hours minimum:  Change oxygen saturation probe site  4.  Every 4-6 hours:  If on nasal continuous positive airway pressure, respiratory therapy assess nares and determine need for appliance change or resting period  3/14/2025 1241 by Carol Ann Murillo RN  Outcome: Progressing     Problem: Gastrointestinal - Adult  Goal: Maintains or returns to baseline bowel function  3/14/2025 1241 by Carol Ann Murillo RN  Outcome: Progressing

## 2025-03-14 NOTE — PROGRESS NOTES
Care rounds completed with Dr. Romero and multidisciplinary team. Reviewed labs, meds, VS, assessment, & plan of care for today. See dictated note and new orders for details.     Change valium to 5 mg q8h PRN

## 2025-03-14 NOTE — PLAN OF CARE
Problem: Chronic Conditions and Co-morbidities  Goal: Patient's chronic conditions and co-morbidity symptoms are monitored and maintained or improved  Outcome: Progressing     Problem: Discharge Planning  Goal: Discharge to home or other facility with appropriate resources  Outcome: Progressing     Problem: Safety - Adult  Goal: Free from fall injury  Outcome: Progressing     Problem: Pain  Goal: Verbalizes/displays adequate comfort level or baseline comfort level  Outcome: Progressing     Problem: Respiratory - Adult  Goal: Achieves optimal ventilation and oxygenation  Outcome: Progressing     Problem: Cardiovascular - Adult  Goal: Maintains optimal cardiac output and hemodynamic stability  Outcome: Progressing     Problem: Skin/Tissue Integrity - Adult  Goal: Skin integrity remains intact  Description: 1.  Monitor for areas of redness and/or skin breakdown  2.  Assess vascular access sites hourly  3.  Every 4-6 hours minimum:  Change oxygen saturation probe site  4.  Every 4-6 hours:  If on nasal continuous positive airway pressure, respiratory therapy assess nares and determine need for appliance change or resting period  Outcome: Progressing     Problem: Musculoskeletal - Adult  Goal: Return mobility to safest level of function  Outcome: Progressing     Problem: Gastrointestinal - Adult  Goal: Maintains or returns to baseline bowel function  Outcome: Progressing     Problem: Infection - Adult  Goal: Absence of infection at discharge  Outcome: Progressing     Problem: Metabolic/Fluid and Electrolytes - Adult  Goal: Electrolytes maintained within normal limits  Outcome: Progressing

## 2025-03-14 NOTE — PROGRESS NOTES
03/14/25 0310   NIV Type   NIV Started/Stopped On   Equipment Type v60   Mode Bilevel   Mask Type Full face mask   Mask Size Extra large   Assessment   Pulse 93   Respirations 18   SpO2 100 %   Comfort Level Good   Using Accessory Muscles No   Mask Compliance Good   Skin Assessment Clean, dry, & intact   Skin Protection for O2 Device Yes   Orientation Middle   Location Nose   Intervention(s) Skin Barrier   Breath Sounds   Respiratory Pattern Regular   Right Upper Lobe Diminished   Right Middle Lobe Diminished   Right Lower Lobe Diminished   Left Upper Lobe Diminished   Left Lower Lobe Diminished   Settings/Measurements   PIP Observed 16 cm H20   IPAP 15 cmH20   CPAP/EPAP 5 cmH2O   Vt (Measured) 454 mL   Rate Ordered 18   Insp Rise Time (%) 1 %   FiO2  50 %   I Time/ I Time % 0.9 s   Minute Volume (L/min) 9 Liters   Mask Leak (lpm) 48 lpm   Patient's Home Machine No   Alarm Settings   Alarms On Y   Low Pressure (cmH2O) 5 cmH2O   High Pressure (cmH2O) 40 cmH2O   Apnea (secs) 20 secs   RR Low (bpm) 12   RR High (bpm) 40 br/min

## 2025-03-14 NOTE — CARE COORDINATION
Reviewed chart, spoke with Shyanne at [facility], P2P needs to be done today by 3 pm. MD made aware via perfect serve. CM following.     920 - P2P scheduled for 12 pm today. MD made aware via perfect serve. They will call ICU @ [phone].    1230 - P2P denied. Met with pt bedside, pt sleeping, no family present. Spoke with Carol Ann MEEKS, updated her. Updated Shyanne putnam with [facility]. Spoke with daughter Catia and updated her. States she would like to try at [facility]. Explained pt will need PEG tube placed. Catia to call family to make sure they are ok with him getting PEG and will call back with update. Ref called to Shamika at [facility].    1530 - multiple family members called for update. Advised to speak with Catia. Shamika states they can accept pt at [facility].    6913 - spoke with Catia again. Explained [facility] has denied and she is calling insurance herself. She is hesitant about a PEG. Cm following.

## 2025-03-14 NOTE — PROGRESS NOTES
4 Eyes Skin Assessment     NAME:  Sandor Early  YOB: 1955  MEDICAL RECORD NUMBER:  [id number]    The patient is being assessed for  Other shift assessment     I agree that at least one RN has performed a thorough Head to Toe Skin Assessment on the patient. ALL assessment sites listed below have been assessed.      Areas assessed by both nurses:    Head, Face, Ears, Shoulders, Back, Chest, Arms, Elbows, Hands, Sacrum. Buttock, Coccyx, Ischium, and Legs. Feet and Heels        Does the Patient have a Wound? No noted wound(s) bruises / abrasions  scattered        Abraham Prevention initiated by RN: No  Wound Care Orders initiated by RN: No    Pressure Injury (Stage 3,4, Unstageable, DTI, NWPT, and Complex wounds) if present, place Wound referral order by RN under : No    New Ostomies, if present place, Ostomy referral order under : No     Nurse 1 eSignature: Electronically signed by Charissa Daugherty RN on 3/14/25 at 12:08 AM EDT    **SHARE this note so that the co-signing nurse can place an eSignature**    Nurse 2 eSignature: Electronically signed by Dominga Sorensen RN on 3/14/25 at 6:35 AM EDT

## 2025-03-14 NOTE — PROGRESS NOTES
03/13/25 2319   NIV Type   NIV Started/Stopped On   Equipment Type v60   Mode Bilevel   Mask Type Full face mask   Mask Size Extra large   Assessment   Pulse 100   Respirations 14   SpO2 97 %   Comfort Level Good   Using Accessory Muscles No   Mask Compliance Good   Skin Assessment Clean, dry, & intact   Skin Protection for O2 Device Yes   Orientation Middle   Location Nose   Intervention(s) Skin Barrier   Breath Sounds   Respiratory Pattern Regular   Right Upper Lobe Diminished   Right Middle Lobe Diminished   Right Lower Lobe Diminished   Left Upper Lobe Diminished   Left Lower Lobe Diminished   Settings/Measurements   PIP Observed 15 cm H20   IPAP 15 cmH20   CPAP/EPAP 5 cmH2O   Vt (Measured) 678 mL   Rate Ordered 18   Insp Rise Time (%) 1 %   FiO2  50 %   I Time/ I Time % 0.9 s   Minute Volume (L/min) 9.6 Liters   Mask Leak (lpm) 57 lpm   Patient's Home Machine No   Alarm Settings   Alarms On Y   Low Pressure (cmH2O) 5 cmH2O   High Pressure (cmH2O) 40 cmH2O   Apnea (secs) 20 secs   RR Low (bpm) 12   RR High (bpm) 40 br/min

## 2025-03-14 NOTE — PROGRESS NOTES
Shift assessment, completed, see flow sheet. Pt is alert and oriented x 2. Following commands, but forgetful , restless, impulsive. Wanting to get out of bed, repeatedly reminding pt we are waiting for PT/OT. Pt appears more agitated when family at bedside.      with QTc 445, /72 (84), SpO2 97% 5L/HFNC. Respirations are easy, even, and unlabored. Bilateral lung sounds diminished. L Nare NGT in place at 64, with TF at 60 mL/hr.RUE PIV, WNL, saline locked.    Castrejon in place and patent with STAT lock d/t retention.    Call light within reach. Bed in lowest position. Bed alarm on.

## 2025-03-14 NOTE — PROGRESS NOTES
Occupational Therapy/Physical Therapy    RN request hold when attempted this PM to allow for protected rest.  Will reattempt later this PM if schedule allows.    Reyna Andre, OTR/L #[id number]  Anaid Adams, PT, DPT

## 2025-03-15 LAB
AMMONIA PLAS-SCNC: 34 UMOL/L (ref 16–60)
ANION GAP SERPL CALCULATED.3IONS-SCNC: 11 MMOL/L (ref 3–16)
BASOPHILS # BLD: 0.1 K/UL (ref 0–0.2)
BASOPHILS NFR BLD: 0.6 %
BUN SERPL-MCNC: 10 MG/DL (ref 7–20)
CALCIUM SERPL-MCNC: 9.4 MG/DL (ref 8.3–10.6)
CHLORIDE SERPL-SCNC: 96 MMOL/L (ref 99–110)
CO2 SERPL-SCNC: 31 MMOL/L (ref 21–32)
CREAT SERPL-MCNC: 0.6 MG/DL (ref 0.8–1.3)
DEPRECATED RDW RBC AUTO: 17.2 % (ref 12.4–15.4)
EOSINOPHIL # BLD: 0.4 K/UL (ref 0–0.6)
EOSINOPHIL NFR BLD: 4 %
GFR SERPLBLD CREATININE-BSD FMLA CKD-EPI: >90 ML/MIN/{1.73_M2}
GLUCOSE BLD-MCNC: 118 MG/DL (ref 70–99)
GLUCOSE BLD-MCNC: 131 MG/DL (ref 70–99)
GLUCOSE BLD-MCNC: 139 MG/DL (ref 70–99)
GLUCOSE BLD-MCNC: 139 MG/DL (ref 70–99)
GLUCOSE BLD-MCNC: 140 MG/DL (ref 70–99)
GLUCOSE SERPL-MCNC: 108 MG/DL (ref 70–99)
HCT VFR BLD AUTO: 31.9 % (ref 40.5–52.5)
HGB BLD-MCNC: 10.2 G/DL (ref 13.5–17.5)
LYMPHOCYTES # BLD: 2 K/UL (ref 1–5.1)
LYMPHOCYTES NFR BLD: 20.1 %
MCH RBC QN AUTO: 28.1 PG (ref 26–34)
MCHC RBC AUTO-ENTMCNC: 31.8 G/DL (ref 31–36)
MCV RBC AUTO: 88.2 FL (ref 80–100)
MONOCYTES # BLD: 0.9 K/UL (ref 0–1.3)
MONOCYTES NFR BLD: 9.5 %
NEUTROPHILS # BLD: 6.5 K/UL (ref 1.7–7.7)
NEUTROPHILS NFR BLD: 65.8 %
PERFORMED ON: ABNORMAL
PLATELET # BLD AUTO: 371 K/UL (ref 135–450)
PMV BLD AUTO: 8.3 FL (ref 5–10.5)
POTASSIUM SERPL-SCNC: 3.8 MMOL/L (ref 3.5–5.1)
RBC # BLD AUTO: 3.62 M/UL (ref 4.2–5.9)
SODIUM SERPL-SCNC: 138 MMOL/L (ref 136–145)
WBC # BLD AUTO: 9.9 K/UL (ref 4–11)

## 2025-03-15 PROCEDURE — 36415 COLL VENOUS BLD VENIPUNCTURE: CPT

## 2025-03-15 PROCEDURE — 2580000003 HC RX 258: Performed by: INTERNAL MEDICINE

## 2025-03-15 PROCEDURE — 94640 AIRWAY INHALATION TREATMENT: CPT

## 2025-03-15 PROCEDURE — 2500000003 HC RX 250 WO HCPCS: Performed by: STUDENT IN AN ORGANIZED HEALTH CARE EDUCATION/TRAINING PROGRAM

## 2025-03-15 PROCEDURE — 6370000000 HC RX 637 (ALT 250 FOR IP): Performed by: STUDENT IN AN ORGANIZED HEALTH CARE EDUCATION/TRAINING PROGRAM

## 2025-03-15 PROCEDURE — 2000000000 HC ICU R&B

## 2025-03-15 PROCEDURE — 6370000000 HC RX 637 (ALT 250 FOR IP): Performed by: INTERNAL MEDICINE

## 2025-03-15 PROCEDURE — 2700000000 HC OXYGEN THERAPY PER DAY

## 2025-03-15 PROCEDURE — 6370000000 HC RX 637 (ALT 250 FOR IP): Performed by: NEUROMUSCULOSKELETAL MEDICINE & OMM

## 2025-03-15 PROCEDURE — 94761 N-INVAS EAR/PLS OXIMETRY MLT: CPT

## 2025-03-15 PROCEDURE — 6360000002 HC RX W HCPCS: Performed by: INTERNAL MEDICINE

## 2025-03-15 PROCEDURE — 82140 ASSAY OF AMMONIA: CPT

## 2025-03-15 PROCEDURE — 85025 COMPLETE CBC W/AUTO DIFF WBC: CPT

## 2025-03-15 PROCEDURE — 99232 SBSQ HOSP IP/OBS MODERATE 35: CPT | Performed by: INTERNAL MEDICINE

## 2025-03-15 PROCEDURE — 94660 CPAP INITIATION&MGMT: CPT

## 2025-03-15 PROCEDURE — 2500000003 HC RX 250 WO HCPCS: Performed by: INTERNAL MEDICINE

## 2025-03-15 PROCEDURE — 80048 BASIC METABOLIC PNL TOTAL CA: CPT

## 2025-03-15 PROCEDURE — 99233 SBSQ HOSP IP/OBS HIGH 50: CPT | Performed by: INTERNAL MEDICINE

## 2025-03-15 RX ORDER — LACTULOSE 10 G/15ML
30 SOLUTION ORAL 2 TIMES DAILY
Status: DISCONTINUED | OUTPATIENT
Start: 2025-03-15 | End: 2025-03-16

## 2025-03-15 RX ADMIN — Medication 5000 UNITS: at 09:23

## 2025-03-15 RX ADMIN — GABAPENTIN 200 MG: 250 SOLUTION ORAL at 05:14

## 2025-03-15 RX ADMIN — Medication 4 ML: at 08:18

## 2025-03-15 RX ADMIN — ATORVASTATIN CALCIUM 40 MG: 40 TABLET, FILM COATED ORAL at 09:24

## 2025-03-15 RX ADMIN — Medication 250 MG: at 09:26

## 2025-03-15 RX ADMIN — LACTULOSE 30 G: 10 SOLUTION ORAL at 22:03

## 2025-03-15 RX ADMIN — GABAPENTIN 200 MG: 250 SOLUTION ORAL at 09:23

## 2025-03-15 RX ADMIN — Medication 15 ML: at 09:00

## 2025-03-15 RX ADMIN — DILTIAZEM HYDROCHLORIDE 60 MG: 60 TABLET ORAL at 15:46

## 2025-03-15 RX ADMIN — QUETIAPINE FUMARATE 50 MG: 25 TABLET ORAL at 09:23

## 2025-03-15 RX ADMIN — QUETIAPINE FUMARATE 50 MG: 25 TABLET ORAL at 22:03

## 2025-03-15 RX ADMIN — Medication 4 ML: at 20:06

## 2025-03-15 RX ADMIN — SODIUM CHLORIDE, PRESERVATIVE FREE 10 ML: 5 INJECTION INTRAVENOUS at 22:05

## 2025-03-15 RX ADMIN — LACTULOSE 30 G: 10 SOLUTION ORAL at 09:23

## 2025-03-15 RX ADMIN — GABAPENTIN 200 MG: 250 SOLUTION ORAL at 22:03

## 2025-03-15 RX ADMIN — ZONISAMIDE 50 MG: 25 CAPSULE ORAL at 22:04

## 2025-03-15 RX ADMIN — METOPROLOL TARTRATE 50 MG: 50 TABLET, FILM COATED ORAL at 09:24

## 2025-03-15 RX ADMIN — OXYCODONE 5 MG: 5 TABLET ORAL at 18:56

## 2025-03-15 RX ADMIN — RIFAXIMIN 550 MG: 550 TABLET ORAL at 09:24

## 2025-03-15 RX ADMIN — DIAZEPAM 5 MG: 5 TABLET ORAL at 15:46

## 2025-03-15 RX ADMIN — GUAIFENESIN 200 MG: 100 SOLUTION ORAL at 22:03

## 2025-03-15 RX ADMIN — ENOXAPARIN SODIUM 40 MG: 100 INJECTION SUBCUTANEOUS at 09:23

## 2025-03-15 RX ADMIN — GABAPENTIN 200 MG: 250 SOLUTION ORAL at 15:46

## 2025-03-15 RX ADMIN — Medication 100 MG: at 09:24

## 2025-03-15 RX ADMIN — RIFAXIMIN 550 MG: 550 TABLET ORAL at 22:03

## 2025-03-15 RX ADMIN — SODIUM CHLORIDE, PRESERVATIVE FREE 10 ML: 5 INJECTION INTRAVENOUS at 09:00

## 2025-03-15 RX ADMIN — ALLOPURINOL 300 MG: 300 TABLET ORAL at 09:24

## 2025-03-15 RX ADMIN — PANTOPRAZOLE SODIUM 40 MG: 40 INJECTION, POWDER, LYOPHILIZED, FOR SOLUTION INTRAVENOUS at 09:23

## 2025-03-15 RX ADMIN — DILTIAZEM HYDROCHLORIDE 60 MG: 60 TABLET ORAL at 05:14

## 2025-03-15 RX ADMIN — METOPROLOL TARTRATE 50 MG: 50 TABLET, FILM COATED ORAL at 22:03

## 2025-03-15 RX ADMIN — DULOXETINE HYDROCHLORIDE 30 MG: 30 CAPSULE, DELAYED RELEASE ORAL at 09:24

## 2025-03-15 RX ADMIN — OXYCODONE 5 MG: 5 TABLET ORAL at 09:24

## 2025-03-15 RX ADMIN — ZONISAMIDE 50 MG: 25 CAPSULE ORAL at 09:00

## 2025-03-15 RX ADMIN — ALBUTEROL SULFATE 2.5 MG: 2.5 SOLUTION RESPIRATORY (INHALATION) at 20:05

## 2025-03-15 RX ADMIN — ALBUTEROL SULFATE 2.5 MG: 2.5 SOLUTION RESPIRATORY (INHALATION) at 08:19

## 2025-03-15 RX ADMIN — ALBUTEROL SULFATE 2.5 MG: 2.5 SOLUTION RESPIRATORY (INHALATION) at 16:21

## 2025-03-15 RX ADMIN — DILTIAZEM HYDROCHLORIDE 60 MG: 60 TABLET ORAL at 22:03

## 2025-03-15 ASSESSMENT — PAIN SCALES - PAIN ASSESSMENT IN ADVANCED DEMENTIA (PAINAD)
CONSOLABILITY: DISTRACTED OR REASSURED BY VOICE/TOUCH
BREATHING: NORMAL
TOTALSCORE: 1
BODYLANGUAGE: RELAXED
FACIALEXPRESSION: SMILING OR INEXPRESSIVE

## 2025-03-15 ASSESSMENT — PAIN SCALES - GENERAL
PAINLEVEL_OUTOF10: 6
PAINLEVEL_OUTOF10: 6

## 2025-03-15 ASSESSMENT — PAIN DESCRIPTION - DESCRIPTORS
DESCRIPTORS: ACHING
DESCRIPTORS: ACHING

## 2025-03-15 ASSESSMENT — PAIN DESCRIPTION - LOCATION
LOCATION: BACK
LOCATION: BACK

## 2025-03-15 NOTE — PROGRESS NOTES
AM assessment done. Pt is alert but remains confused.  Pt is on 4 liters per NC and is tolerating well @ this time.  Will continue to monitor.

## 2025-03-15 NOTE — PROGRESS NOTES
03/14/25 2344   NIV Type   Equipment Type v60   Mode Bilevel   Mask Type Full face mask   Mask Size Extra large   Assessment   Pulse 92   Respirations 19   SpO2 100 %   Settings/Measurements   IPAP 15 cmH20   CPAP/EPAP 5 cmH2O   Vt (Measured) 560 mL   Rate Ordered 18   FiO2  50 %   Minute Volume (L/min) 12.3 Liters   Mask Leak (lpm) 49 lpm   Patient's Home Machine No   Patient Observation   Patient Observations spo2 100% on 50% bipap

## 2025-03-15 NOTE — PROGRESS NOTES
CM-SR-ST, VSS, pt remains afebrile, UO WNL.  Pt is alert but confused.  Restraints are off @ this time, but a sitter remains at the bedside.  Will continue to monitor.

## 2025-03-15 NOTE — PROGRESS NOTES
03/15/25 0326   NIV Type   NIV Started/Stopped On   Equipment Type v60   Mode Bilevel   Mask Type Full face mask   Mask Size Extra large   Assessment   Pulse 99   Respirations 18   SpO2 100 %   Settings/Measurements   IPAP 15 cmH20   CPAP/EPAP 5 cmH2O   Vt (Measured) 533 mL   Rate Ordered 18   FiO2  40 %   I Time/ I Time % 0.9 s   Minute Volume (L/min) 14.6 Liters   Mask Leak (lpm) 10 lpm   Patient's Home Machine No

## 2025-03-15 NOTE — PROGRESS NOTES
Pulmonary & Critical Care Medicine ICU Progress Note    CC: COPD respiratory failure    Events of Last 24 hours:  BiPAP 15/5 50%   7---> 5--> 4 L  Secretions with NTS - better       Vascular lines: IV: Left IJ 2/14-2/26. R Midline 2/25    MV: 2/14-3/4/25  Vent Mode: AC/VC Resp Rate (Set): 0 bpm/Vt (Set, mL): 0 mL/ /FiO2 : 40 %  No results for input(s): \"PHART\", \"JEH2EVA\", \"PO2ART\" in the last 72 hours.        IV:   sodium chloride      sodium chloride      dextrose         Vitals:  Blood pressure 139/68, pulse (!) 104, temperature 99 °F (37.2 °C), temperature source Bladder, resp. rate 17, height 1.702 m (5' 7.01\"), weight 79.2 kg (174 lb 9.7 oz), SpO2 98%.  on 4 L    EXAM:  General: ill appearing.    Eyes: No sclera icterus. No conjunctival injection.  ENT: No discharge.   Neck: Trachea midline.   Resp: No accessory muscle use. Few crackles. No wheezing. + rhonchi.   CV: Regular  rate. Regular rhythm. No mumur or rub. No edema.   GI: Non-tender. Non-distended.   Skin: Warm and dry. No rash on exposed extremities.  M/S: No cyanosis. No clubbing.   Neuro: + following commands.  Good cough.   Psych: Alert awake. Oriented x 1. Mild agiation. Alert to name        Scheduled Meds:   levETIRAcetam  250 mg Oral BID    sodium chloride (Inhalant)  4 mL Nebulization BID RT    QUEtiapine  50 mg Oral BID    thiamine  100 mg Oral Daily    rifAXIMin  550 mg Oral BID    lactulose  30 g Oral TID    zonisamide  50 mg Oral BID    Gabapentin  200 mg Per NG tube Q6H    DULoxetine  30 mg Oral Daily    cloNIDine  1 patch TransDERmal Weekly    CENTRUM/CERTA-KAYKAY with minerals oral  15 mL Oral Daily    dilTIAZem  60 mg Oral 3 times per day    metoprolol tartrate  50 mg Oral BID    polyethylene glycol  17 g Oral Daily    sodium chloride flush  5-40 mL IntraVENous 2 times per day    insulin lispro  0-16 Units SubCUTAneous Q4H    albuterol  2.5 mg Nebulization 4x Daily RT    sodium chloride flush  5-40 mL IntraVENous 2 times per day     pantoprazole (PROTONIX) 40 mg in sodium chloride (PF) 0.9 % 10 mL injection  40 mg IntraVENous Daily    enoxaparin  40 mg SubCUTAneous Daily    allopurinol  300 mg Oral Daily    vitamin D3  5,000 Units Oral Daily    atorvastatin  40 mg Oral Daily       Data:  CBC:   Recent Labs     03/13/25  0512 03/14/25  0427 03/15/25  0425   WBC 7.8 8.5 9.9   HGB 10.0* 9.8* 10.2*   HCT 31.7* 29.7* 31.9*   MCV 91.2 88.2 88.2    372 371     BMP:   Recent Labs     03/13/25  0513 03/14/25  0427 03/15/25  0425   * 136 138   K 4.4 3.9 3.8   CL 95* 94* 96*   CO2 32 36* 31   BUN 11 11 10   CREATININE 0.6* 0.6* 0.6*     LIVER PROFILE:   No results for input(s): \"AST\", \"ALT\", \"LIPASE\", \"AMYLASE\", \"BILIDIR\", \"BILITOT\", \"ALKPHOS\" in the last 72 hours.    Invalid input(s): \"ALB\"      Microbiology:  1/20 Flu DETECTED  2/5 Strep and leg negative   2/13 BC NGTD  2/13 respiratory  MRSA    2/13 MRSA DNA probe DETECTED  2/21 BAL NGTD      Imaging:  CXR 3/4 images independently reviewed by me and showed:  Slightly improved perihilar opacities centrally.         ASSESSMENT:  Acute hypoxemic hypercapnic respiratory failure   Agitation/restlessness  Acute encephalopathy/Metabolic encephalopathy   Tracheobronchitis with possible PNA - MRSA    RUE edema- SVT  COPD   Alcohol withdrawal with Delirium tremens  Ileus/Enteritis   Chronic hypoxia on 4 L O2  Flu A + 1/30  Tobacco abuse  ROCKWELL cirrhosis  Chronic diastolic dysfunction  DM2  PVD status post aortoiliac bpg  Patient has had several episodes of seizure-like activity   Alcohol use/abuse 30 beer/day        PLAN:  Supplemental oxygen to maintain SaO2 >92%; wean as tolerated  BIPAP QHS and PRN during the day   Inhaled bronchodilators  Metanebs and prn nt suctioning  Seroquel 50 PO BID- QTc 442 ---> 438 --> 427  Valium 5 mg PO Q8 hrs PRN  Completed Vanc D#10/10, Zosyn D#8 and Diflucan D#7, Tamiflu D#5 and Zpak  Keppra started by neurology- Seizure and fall precautions  Neurontin BID

## 2025-03-15 NOTE — PROGRESS NOTES
Progress Note    Admit Date:  1/30/2025    Interval history    Admitted for flu, copd exacerbation, course complicated with development of sbo, resp failure needing vent support     Tolerated pressure support 5/5 for 6 hours yesterday.  Plans for spontaneous breathing trial and possible extubation today.  On Precedex.    3/5-extubated on 3/4/2025.  Placed on BiPAP.  Has been intermittently confused.  Was on Precedex.  Has had some intermittent agitation.  When I saw the patient his eyes are open.   at bedside.  He has a good cough.    3/6- more calm then yesterday. On Bipap. Failed speech eval but they will see him again. Responding nicely to verbal commands,    3/7- more calm having thick secretions. ST has recommended MBS. He is NPO. Used Bipap last night. BP elevated today.    3/9- MR singer seen awake, but confused, restless was given seroquel for agitation. Confused and not directable only for few minutes and gets restless, throwing stuff  Was on precedex overnight but now off    3/10  Continues to be disoriented   Recd haldol yesterday  Now in restraints  Used BiPAP at night   Now on 5 L     3/11  Disoriented   Off restraints now   biPAP for 4-5 hours last night   On 5 L now     3/12   Continues to be confused   On BiPAP at night   5 L now     3/13  On BiPAP  Restless,disoriented     3/14   On BIPAP all night  Now on 4 L   Confused     3/15   On Bipap at night  Now on 4 L      Objective:   Patient Vitals for the past 4 hrs:   BP Temp Temp src Pulse Resp SpO2   03/15/25 1200 (!) 143/74 98.9 °F (37.2 °C) Bladder 96 21 98 %   03/15/25 1100 131/72 -- -- 96 18 100 %   03/15/25 1000 129/83 -- -- (!) 117 20 100 %   03/15/25 0900 127/68 -- -- (!) 112 21 95 %          Intake/Output Summary (Last 24 hours) at 3/15/2025 1221  Last data filed at 3/15/2025 0420  Gross per 24 hour   Intake 1870 ml   Output 975 ml   Net 895 ml       Physical Exam:  General: elderly male ill appearing , confused in

## 2025-03-15 NOTE — PROGRESS NOTES
03/14/25 2000   RT Protocol   History Pulmonary Disease 2   Respiratory pattern 2   Breath sounds 2   Cough 1   Indications for Bronchodilator Therapy Decreased or absent breath sounds   Bronchodilator Assessment Score 7     RT Inhaler-Nebulizer Bronchodilator Protocol Note    There is a bronchodilator order in the chart from a provider indicating to follow the RT Bronchodilator Protocol and there is an “Initiate RT Inhaler-Nebulizer Bronchodilator Protocol” order as well (see protocol at bottom of note).    CXR Findings:  No results found.    The findings from the last RT Protocol Assessment were as follows:   History Pulmonary Disease: Chronic pulmonary disease  Respiratory Pattern: Dyspnea on exertion or RR 21-25 bpm  Breath Sounds: Slightly diminished and/or crackles  Cough: Strong, productive  Indication for Bronchodilator Therapy: Decreased or absent breath sounds  Bronchodilator Assessment Score: 7    Aerosolized bronchodilator medication orders have been revised according to the RT Inhaler-Nebulizer Bronchodilator Protocol below.    Respiratory Therapist to perform RT Therapy Protocol Assessment initially then follow the protocol.  Repeat RT Therapy Protocol Assessment PRN for score 0-3 or on second treatment, BID, and PRN for scores above 3.    No Indications - adjust the frequency to every 6 hours PRN wheezing or bronchospasm, if no treatments needed after 48 hours then discontinue using Per Protocol order mode.     If indication present, adjust the RT bronchodilator orders based on the Bronchodilator Assessment Score as indicated below.  Use Inhaler orders unless patient has one or more of the following: on home nebulizer, not able to hold breath for 10 seconds, is not alert and oriented, cannot activate and use MDI correctly, or respiratory rate 25 breaths per minute or more, then use the equivalent nebulizer order(s) with same Frequency and PRN reasons based on the score.  If a patient is on this

## 2025-03-15 NOTE — PROGRESS NOTES
Patient placed on bipap for the hs on settings of 15/5 50%.  Bipap oxygen decreased from 50% to 40%

## 2025-03-15 NOTE — PLAN OF CARE
Problem: Chronic Conditions and Co-morbidities  Goal: Patient's chronic conditions and co-morbidity symptoms are monitored and maintained or improved  Outcome: Progressing  Flowsheets (Taken 3/14/2025 2100)  Care Plan - Patient's Chronic Conditions and Co-Morbidity Symptoms are Monitored and Maintained or Improved:   Monitor and assess patient's chronic conditions and comorbid symptoms for stability, deterioration, or improvement   Collaborate with multidisciplinary team to address chronic and comorbid conditions and prevent exacerbation or deterioration   Update acute care plan with appropriate goals if chronic or comorbid symptoms are exacerbated and prevent overall improvement and discharge   Pt remains on lactulose for poor liver function.   Problem: Respiratory - Adult  Goal: Achieves optimal ventilation and oxygenation  Outcome: Progressing  Flowsheets (Taken 3/14/2025 2100)  Achieves optimal ventilation and oxygenation:   Assess for changes in respiratory status   Assess for changes in mentation and behavior   Position to facilitate oxygenation and minimize respiratory effort   Oxygen supplementation based on oxygen saturation or arterial blood gases   Initiate smoking cessation protocol as indicated   Encourage broncho-pulmonary hygiene including cough, deep breathe, incentive spirometry   Assess the need for suctioning and aspirate as needed   Assess and instruct to report shortness of breath or any respiratory difficulty   Respiratory therapy support as indicated   Pt does well on BiPap but unable to leave it alone. Remains agitated at baseline.   Problem: Cardiovascular - Adult  Goal: Maintains optimal cardiac output and hemodynamic stability  Outcome: Progressing  Flowsheets (Taken 3/14/2025 2100)  Maintains optimal cardiac output and hemodynamic stability:   Monitor blood pressure and heart rate   Monitor urine output and notify Licensed Independent Practitioner for values outside of normal range

## 2025-03-15 NOTE — PLAN OF CARE
Problem: Chronic Conditions and Co-morbidities  Goal: Patient's chronic conditions and co-morbidity symptoms are monitored and maintained or improved  3/15/2025 1150 by Xavi Jeong RN  Outcome: Progressing  3/15/2025 0655 by Viki Tran RN  Outcome: Progressing  Flowsheets (Taken 3/14/2025 2100)  Care Plan - Patient's Chronic Conditions and Co-Morbidity Symptoms are Monitored and Maintained or Improved:   Monitor and assess patient's chronic conditions and comorbid symptoms for stability, deterioration, or improvement   Collaborate with multidisciplinary team to address chronic and comorbid conditions and prevent exacerbation or deterioration   Update acute care plan with appropriate goals if chronic or comorbid symptoms are exacerbated and prevent overall improvement and discharge     Problem: Discharge Planning  Goal: Discharge to home or other facility with appropriate resources  Outcome: Progressing     Problem: Safety - Adult  Goal: Free from fall injury  3/15/2025 1150 by Xavi Jeong RN  Outcome: Progressing  3/15/2025 0655 by Viki Tran RN  Outcome: Progressing     Problem: Respiratory - Adult  Goal: Achieves optimal ventilation and oxygenation  3/15/2025 1150 by Xavi Jeong RN  Outcome: Progressing  3/15/2025 0655 by Viki Tran RN  Outcome: Progressing  Flowsheets (Taken 3/14/2025 2100)  Achieves optimal ventilation and oxygenation:   Assess for changes in respiratory status   Assess for changes in mentation and behavior   Position to facilitate oxygenation and minimize respiratory effort   Oxygen supplementation based on oxygen saturation or arterial blood gases   Initiate smoking cessation protocol as indicated   Encourage broncho-pulmonary hygiene including cough, deep breathe, incentive spirometry   Assess the need for suctioning and aspirate as needed   Assess and instruct to report shortness of breath or any respiratory difficulty   Respiratory therapy support as indicated

## 2025-03-15 NOTE — PROGRESS NOTES
Pt awake all night yelling for help and calling people's names asking for help. Attempted several times to leave one restraint untied and pt immediately flipped his legs and 1/2 of his body over the siderail. Pt given a minute to evercise and then restraint retied. Sitter at bedside within arms reach to assist pt with BiPap when needed.

## 2025-03-16 LAB
ANION GAP SERPL CALCULATED.3IONS-SCNC: 6 MMOL/L (ref 3–16)
BASOPHILS # BLD: 0.2 K/UL (ref 0–0.2)
BASOPHILS NFR BLD: 2.2 %
BUN SERPL-MCNC: 11 MG/DL (ref 7–20)
CALCIUM SERPL-MCNC: 9 MG/DL (ref 8.3–10.6)
CHLORIDE SERPL-SCNC: 96 MMOL/L (ref 99–110)
CO2 SERPL-SCNC: 35 MMOL/L (ref 21–32)
CREAT SERPL-MCNC: 0.5 MG/DL (ref 0.8–1.3)
DEPRECATED RDW RBC AUTO: 17.2 % (ref 12.4–15.4)
EOSINOPHIL # BLD: 0.7 K/UL (ref 0–0.6)
EOSINOPHIL NFR BLD: 8 %
GFR SERPLBLD CREATININE-BSD FMLA CKD-EPI: >90 ML/MIN/{1.73_M2}
GLUCOSE BLD-MCNC: 118 MG/DL (ref 70–99)
GLUCOSE BLD-MCNC: 125 MG/DL (ref 70–99)
GLUCOSE BLD-MCNC: 134 MG/DL (ref 70–99)
GLUCOSE BLD-MCNC: 138 MG/DL (ref 70–99)
GLUCOSE SERPL-MCNC: 126 MG/DL (ref 70–99)
HCT VFR BLD AUTO: 29.2 % (ref 40.5–52.5)
HGB BLD-MCNC: 9.5 G/DL (ref 13.5–17.5)
LYMPHOCYTES # BLD: 0.9 K/UL (ref 1–5.1)
LYMPHOCYTES NFR BLD: 10.2 %
MCH RBC QN AUTO: 28.2 PG (ref 26–34)
MCHC RBC AUTO-ENTMCNC: 32.4 G/DL (ref 31–36)
MCV RBC AUTO: 86.8 FL (ref 80–100)
MONOCYTES # BLD: 0.7 K/UL (ref 0–1.3)
MONOCYTES NFR BLD: 7.7 %
NEUTROPHILS # BLD: 6.6 K/UL (ref 1.7–7.7)
NEUTROPHILS NFR BLD: 71.9 %
PERFORMED ON: ABNORMAL
PLATELET # BLD AUTO: 331 K/UL (ref 135–450)
PMV BLD AUTO: 7.8 FL (ref 5–10.5)
POTASSIUM SERPL-SCNC: 3.9 MMOL/L (ref 3.5–5.1)
RBC # BLD AUTO: 3.36 M/UL (ref 4.2–5.9)
SODIUM SERPL-SCNC: 137 MMOL/L (ref 136–145)
WBC # BLD AUTO: 9.2 K/UL (ref 4–11)

## 2025-03-16 PROCEDURE — 2500000003 HC RX 250 WO HCPCS: Performed by: INTERNAL MEDICINE

## 2025-03-16 PROCEDURE — 6370000000 HC RX 637 (ALT 250 FOR IP): Performed by: INTERNAL MEDICINE

## 2025-03-16 PROCEDURE — 80048 BASIC METABOLIC PNL TOTAL CA: CPT

## 2025-03-16 PROCEDURE — 6360000002 HC RX W HCPCS: Performed by: INTERNAL MEDICINE

## 2025-03-16 PROCEDURE — 99233 SBSQ HOSP IP/OBS HIGH 50: CPT | Performed by: INTERNAL MEDICINE

## 2025-03-16 PROCEDURE — 2700000000 HC OXYGEN THERAPY PER DAY

## 2025-03-16 PROCEDURE — 94640 AIRWAY INHALATION TREATMENT: CPT

## 2025-03-16 PROCEDURE — 6370000000 HC RX 637 (ALT 250 FOR IP): Performed by: NEUROMUSCULOSKELETAL MEDICINE & OMM

## 2025-03-16 PROCEDURE — 6370000000 HC RX 637 (ALT 250 FOR IP): Performed by: STUDENT IN AN ORGANIZED HEALTH CARE EDUCATION/TRAINING PROGRAM

## 2025-03-16 PROCEDURE — 36415 COLL VENOUS BLD VENIPUNCTURE: CPT

## 2025-03-16 PROCEDURE — 99232 SBSQ HOSP IP/OBS MODERATE 35: CPT | Performed by: INTERNAL MEDICINE

## 2025-03-16 PROCEDURE — 94660 CPAP INITIATION&MGMT: CPT

## 2025-03-16 PROCEDURE — 94761 N-INVAS EAR/PLS OXIMETRY MLT: CPT

## 2025-03-16 PROCEDURE — 2000000000 HC ICU R&B

## 2025-03-16 PROCEDURE — 2580000003 HC RX 258: Performed by: INTERNAL MEDICINE

## 2025-03-16 PROCEDURE — 85025 COMPLETE CBC W/AUTO DIFF WBC: CPT

## 2025-03-16 RX ORDER — DEXMEDETOMIDINE HYDROCHLORIDE 4 UG/ML
.1-1.5 INJECTION, SOLUTION INTRAVENOUS PRN
Status: DISCONTINUED | OUTPATIENT
Start: 2025-03-16 | End: 2025-03-16 | Stop reason: ALTCHOICE

## 2025-03-16 RX ORDER — HALOPERIDOL 5 MG/ML
2 INJECTION INTRAMUSCULAR
Status: DISCONTINUED | OUTPATIENT
Start: 2025-03-16 | End: 2025-03-16

## 2025-03-16 RX ORDER — DIAZEPAM 5 MG/1
5 TABLET ORAL EVERY 6 HOURS PRN
Status: DISCONTINUED | OUTPATIENT
Start: 2025-03-16 | End: 2025-03-19

## 2025-03-16 RX ORDER — LACTULOSE 10 G/15ML
30 SOLUTION ORAL DAILY
Status: DISCONTINUED | OUTPATIENT
Start: 2025-03-17 | End: 2025-03-22

## 2025-03-16 RX ORDER — HALOPERIDOL 5 MG/ML
2 INJECTION INTRAMUSCULAR ONCE
Status: COMPLETED | OUTPATIENT
Start: 2025-03-16 | End: 2025-03-16

## 2025-03-16 RX ADMIN — RIFAXIMIN 550 MG: 550 TABLET ORAL at 20:27

## 2025-03-16 RX ADMIN — HALOPERIDOL LACTATE 2 MG: 5 INJECTION, SOLUTION INTRAMUSCULAR at 01:48

## 2025-03-16 RX ADMIN — DILTIAZEM HYDROCHLORIDE 60 MG: 60 TABLET ORAL at 15:12

## 2025-03-16 RX ADMIN — Medication 4 ML: at 19:55

## 2025-03-16 RX ADMIN — ZONISAMIDE 50 MG: 25 CAPSULE ORAL at 20:28

## 2025-03-16 RX ADMIN — ACETAMINOPHEN 650 MG: 325 TABLET ORAL at 20:27

## 2025-03-16 RX ADMIN — RIFAXIMIN 550 MG: 550 TABLET ORAL at 07:59

## 2025-03-16 RX ADMIN — SODIUM CHLORIDE, PRESERVATIVE FREE 10 ML: 5 INJECTION INTRAVENOUS at 20:31

## 2025-03-16 RX ADMIN — QUETIAPINE FUMARATE 50 MG: 25 TABLET ORAL at 07:59

## 2025-03-16 RX ADMIN — METOPROLOL TARTRATE 50 MG: 50 TABLET, FILM COATED ORAL at 20:28

## 2025-03-16 RX ADMIN — ALBUTEROL SULFATE 2.5 MG: 2.5 SOLUTION RESPIRATORY (INHALATION) at 08:27

## 2025-03-16 RX ADMIN — ENOXAPARIN SODIUM 40 MG: 100 INJECTION SUBCUTANEOUS at 07:58

## 2025-03-16 RX ADMIN — Medication 4 ML: at 08:27

## 2025-03-16 RX ADMIN — GABAPENTIN 200 MG: 250 SOLUTION ORAL at 07:58

## 2025-03-16 RX ADMIN — ALBUTEROL SULFATE 2.5 MG: 2.5 SOLUTION RESPIRATORY (INHALATION) at 15:47

## 2025-03-16 RX ADMIN — DIAZEPAM 5 MG: 5 TABLET ORAL at 20:28

## 2025-03-16 RX ADMIN — ATORVASTATIN CALCIUM 40 MG: 40 TABLET, FILM COATED ORAL at 09:00

## 2025-03-16 RX ADMIN — METOPROLOL TARTRATE 50 MG: 50 TABLET, FILM COATED ORAL at 08:01

## 2025-03-16 RX ADMIN — ALBUTEROL SULFATE 2.5 MG: 2.5 SOLUTION RESPIRATORY (INHALATION) at 12:16

## 2025-03-16 RX ADMIN — ZONISAMIDE 50 MG: 25 CAPSULE ORAL at 09:00

## 2025-03-16 RX ADMIN — SODIUM CHLORIDE, PRESERVATIVE FREE 10 ML: 5 INJECTION INTRAVENOUS at 08:01

## 2025-03-16 RX ADMIN — DULOXETINE HYDROCHLORIDE 30 MG: 30 CAPSULE, DELAYED RELEASE ORAL at 07:59

## 2025-03-16 RX ADMIN — OXYCODONE 5 MG: 5 TABLET ORAL at 07:59

## 2025-03-16 RX ADMIN — DIAZEPAM 5 MG: 5 TABLET ORAL at 00:38

## 2025-03-16 RX ADMIN — ALBUTEROL SULFATE 2.5 MG: 2.5 SOLUTION RESPIRATORY (INHALATION) at 19:54

## 2025-03-16 RX ADMIN — Medication 15 ML: at 08:00

## 2025-03-16 RX ADMIN — DILTIAZEM HYDROCHLORIDE 60 MG: 60 TABLET ORAL at 20:28

## 2025-03-16 RX ADMIN — DIAZEPAM 5 MG: 5 TABLET ORAL at 13:44

## 2025-03-16 RX ADMIN — LACTULOSE 30 G: 10 SOLUTION ORAL at 07:58

## 2025-03-16 RX ADMIN — DILTIAZEM HYDROCHLORIDE 60 MG: 60 TABLET ORAL at 06:17

## 2025-03-16 RX ADMIN — HALOPERIDOL LACTATE 2 MG: 5 INJECTION, SOLUTION INTRAMUSCULAR at 17:00

## 2025-03-16 RX ADMIN — OXYCODONE 5 MG: 5 TABLET ORAL at 23:51

## 2025-03-16 RX ADMIN — Medication 5000 UNITS: at 09:00

## 2025-03-16 RX ADMIN — Medication 100 MG: at 07:59

## 2025-03-16 RX ADMIN — OXYCODONE 5 MG: 5 TABLET ORAL at 16:05

## 2025-03-16 RX ADMIN — QUETIAPINE FUMARATE 50 MG: 25 TABLET ORAL at 20:27

## 2025-03-16 RX ADMIN — GABAPENTIN 200 MG: 250 SOLUTION ORAL at 15:10

## 2025-03-16 RX ADMIN — ALLOPURINOL 300 MG: 300 TABLET ORAL at 07:59

## 2025-03-16 RX ADMIN — GABAPENTIN 200 MG: 250 SOLUTION ORAL at 20:27

## 2025-03-16 RX ADMIN — PANTOPRAZOLE SODIUM 40 MG: 40 INJECTION, POWDER, LYOPHILIZED, FOR SOLUTION INTRAVENOUS at 07:58

## 2025-03-16 ASSESSMENT — PAIN SCALES - PAIN ASSESSMENT IN ADVANCED DEMENTIA (PAINAD)
BREATHING: NORMAL
BREATHING: NORMAL
TOTALSCORE: 0
FACIALEXPRESSION: SMILING OR INEXPRESSIVE
BODYLANGUAGE: RELAXED
CONSOLABILITY: NO NEED TO CONSOLE
TOTALSCORE: 0
BODYLANGUAGE: RELAXED
FACIALEXPRESSION: SMILING OR INEXPRESSIVE
CONSOLABILITY: NO NEED TO CONSOLE

## 2025-03-16 ASSESSMENT — PAIN DESCRIPTION - LOCATION
LOCATION: RIB CAGE
LOCATION: BACK

## 2025-03-16 ASSESSMENT — PAIN SCALES - GENERAL
PAINLEVEL_OUTOF10: 5
PAINLEVEL_OUTOF10: 6
PAINLEVEL_OUTOF10: 0
PAINLEVEL_OUTOF10: 6
PAINLEVEL_OUTOF10: 5

## 2025-03-16 ASSESSMENT — PAIN DESCRIPTION - ORIENTATION
ORIENTATION: MID
ORIENTATION: MID
ORIENTATION: RIGHT

## 2025-03-16 ASSESSMENT — PAIN - FUNCTIONAL ASSESSMENT: PAIN_FUNCTIONAL_ASSESSMENT: ACTIVITIES ARE NOT PREVENTED

## 2025-03-16 ASSESSMENT — PAIN DESCRIPTION - DESCRIPTORS
DESCRIPTORS: ACHING

## 2025-03-16 ASSESSMENT — PULMONARY FUNCTION TESTS: PEFR_L/MIN: 14

## 2025-03-16 NOTE — PROGRESS NOTES
03/15/25 2000   RT Protocol   History Pulmonary Disease 2   Respiratory pattern 2   Breath sounds 2   Cough 1   Indications for Bronchodilator Therapy Decreased or absent breath sounds   Bronchodilator Assessment Score 7     RT Inhaler-Nebulizer Bronchodilator Protocol Note    There is a bronchodilator order in the chart from a provider indicating to follow the RT Bronchodilator Protocol and there is an “Initiate RT Inhaler-Nebulizer Bronchodilator Protocol” order as well (see protocol at bottom of note).    CXR Findings:  No results found.    The findings from the last RT Protocol Assessment were as follows:   History Pulmonary Disease: Chronic pulmonary disease  Respiratory Pattern: Dyspnea on exertion or RR 21-25 bpm  Breath Sounds: Slightly diminished and/or crackles  Cough: Strong, productive  Indication for Bronchodilator Therapy: Decreased or absent breath sounds  Bronchodilator Assessment Score: 7    Aerosolized bronchodilator medication orders have been revised according to the RT Inhaler-Nebulizer Bronchodilator Protocol below.    Respiratory Therapist to perform RT Therapy Protocol Assessment initially then follow the protocol.  Repeat RT Therapy Protocol Assessment PRN for score 0-3 or on second treatment, BID, and PRN for scores above 3.    No Indications - adjust the frequency to every 6 hours PRN wheezing or bronchospasm, if no treatments needed after 48 hours then discontinue using Per Protocol order mode.     If indication present, adjust the RT bronchodilator orders based on the Bronchodilator Assessment Score as indicated below.  Use Inhaler orders unless patient has one or more of the following: on home nebulizer, not able to hold breath for 10 seconds, is not alert and oriented, cannot activate and use MDI correctly, or respiratory rate 25 breaths per minute or more, then use the equivalent nebulizer order(s) with same Frequency and PRN reasons based on the score.  If a patient is on this

## 2025-03-16 NOTE — PROGRESS NOTES
RT Inhaler-Nebulizer Bronchodilator Protocol Note    There is a bronchodilator order in the chart from a provider indicating to follow the RT Bronchodilator Protocol and there is an “Initiate RT Inhaler-Nebulizer Bronchodilator Protocol” order as well (see protocol at bottom of note).    CXR Findings:  No results found.    The findings from the last RT Protocol Assessment were as follows:   History Pulmonary Disease: (P) Chronic pulmonary disease  Respiratory Pattern: (P) Dyspnea on exertion or RR 21-25 bpm  Breath Sounds: (P) Inspiratory and expiratory or bilateral wheezing and/or rhonchi  Cough: (P) Strong, productive  Indication for Bronchodilator Therapy: (P) Decreased or absent breath sounds  Bronchodilator Assessment Score: (P) 11    Aerosolized bronchodilator medication orders have been revised according to the RT Inhaler-Nebulizer Bronchodilator Protocol below.    Respiratory Therapist to perform RT Therapy Protocol Assessment initially then follow the protocol.  Repeat RT Therapy Protocol Assessment PRN for score 0-3 or on second treatment, BID, and PRN for scores above 3.    No Indications - adjust the frequency to every 6 hours PRN wheezing or bronchospasm, if no treatments needed after 48 hours then discontinue using Per Protocol order mode.     If indication present, adjust the RT bronchodilator orders based on the Bronchodilator Assessment Score as indicated below.  Use Inhaler orders unless patient has one or more of the following: on home nebulizer, not able to hold breath for 10 seconds, is not alert and oriented, cannot activate and use MDI correctly, or respiratory rate 25 breaths per minute or more, then use the equivalent nebulizer order(s) with same Frequency and PRN reasons based on the score.  If a patient is on this medication at home then do not decrease Frequency below that used at home.    0-3 - enter or revise RT bronchodilator order(s) to equivalent RT Bronchodilator order with

## 2025-03-16 NOTE — PROGRESS NOTES
AM assessment done. Pt is alert but confused. Pt is restless and c/o side discomfort.  O2 is at 6 liters per high flow NC. SaO2 90-92%  Will continue to monitor and wean O2 as tolerated.

## 2025-03-16 NOTE — PROGRESS NOTES
Progress Note    Admit Date:  1/30/2025    Interval history    Admitted for flu, copd exacerbation, course complicated with development of sbo, resp failure needing vent support     Tolerated pressure support 5/5 for 6 hours yesterday.  Plans for spontaneous breathing trial and possible extubation today.  On Precedex.    3/5-extubated on 3/4/2025.  Placed on BiPAP.  Has been intermittently confused.  Was on Precedex.  Has had some intermittent agitation.  When I saw the patient his eyes are open.   at bedside.  He has a good cough.    3/6- more calm then yesterday. On Bipap. Failed speech eval but they will see him again. Responding nicely to verbal commands,    3/7- more calm having thick secretions. ST has recommended MBS. He is NPO. Used Bipap last night. BP elevated today.    3/9- MR singer seen awake, but confused, restless was given seroquel for agitation. Confused and not directable only for few minutes and gets restless, throwing stuff  Was on precedex overnight but now off    3/10  Continues to be disoriented   Recd haldol yesterday  Now in restraints  Used BiPAP at night   Now on 5 L     3/11  Disoriented   Off restraints now   biPAP for 4-5 hours last night   On 5 L now     3/12   Continues to be confused   On BiPAP at night   5 L now     3/13  On BiPAP  Restless,disoriented     3/14   On BIPAP all night  Now on 4 L   Confused     3/15   On Bipap at night  Now on 4 L     3/16   Bipap at night   Now on 4 L      Objective:   Patient Vitals for the past 4 hrs:   BP Pulse Resp SpO2   03/16/25 1000 127/61 86 13 99 %   03/16/25 0900 (!) 110/59 86 15 98 %   03/16/25 0827 -- 98 21 98 %          Intake/Output Summary (Last 24 hours) at 3/16/2025 1205  Last data filed at 3/16/2025 0614  Gross per 24 hour   Intake 2170 ml   Output 1435 ml   Net 735 ml       Physical Exam:  General: elderly male ill appearing , confused in bed  awake  Mucous Membranes:  Pink , anicteric  Neck: No JVD, no carotid  bruit, no thyromegaly  NG tube noted   Chest diminished in bases  No accessory muscle use.  Minimal wheezing.  Few rhonchi.  Cardiovascular:  RRR S1S2 heard, no murmurs or gallops  Abdomen:  Soft, obese un distended, non tender, no organomegaly, BS absent  Extremities: trace edema to ext improved   Distal pulses well felt  Neurological awake and alert. Moves upper ext with ease but not lower ext. Confused with gen weakness    cations:  [START ON 3/17/2025] lactulose, 30 g, Daily  sodium chloride (Inhalant), 4 mL, BID RT  QUEtiapine, 50 mg, BID  thiamine, 100 mg, Daily  rifAXIMin, 550 mg, BID  zonisamide, 50 mg, BID  Gabapentin, 200 mg, Q6H  DULoxetine, 30 mg, Daily  cloNIDine, 1 patch, Weekly  CENTRUM/CERTA-KAYKAY with minerals oral, 15 mL, Daily  dilTIAZem, 60 mg, 3 times per day  metoprolol tartrate, 50 mg, BID  polyethylene glycol, 17 g, Daily  sodium chloride flush, 5-40 mL, 2 times per day  insulin lispro, 0-16 Units, Q4H  albuterol, 2.5 mg, 4x Daily RT  pantoprazole (PROTONIX) 40 mg in sodium chloride (PF) 0.9 % 10 mL injection, 40 mg, Daily  enoxaparin, 40 mg, Daily  allopurinol, 300 mg, Daily  vitamin D3, 5,000 Units, Daily  atorvastatin, 40 mg, Daily      PRN Medications:  diazePAM, 5 mg, Q6H PRN  hydrALAZINE, 10 mg, Q6H PRN  oxyCODONE, 5 mg, Q8H PRN  potassium chloride, 40 mEq, PRN   Or  potassium alternative oral replacement, 40 mEq, PRN  prochlorperazine, 10 mg, Q8H PRN  albuterol, 2.5 mg, Q4H PRN  carboxymethylcellulose PF, 1 drop, PRN  sodium chloride flush, 5-40 mL, PRN  sodium chloride, , PRN  diatrizoate meglumine-sodium, 12 mL, ONCE PRN  guaiFENesin, 200 mg, Q4H PRN  benzocaine, , 4x Daily PRN  phenol, 1 spray, Q2H PRN  sodium chloride, , PRN  acetaminophen, 650 mg, Q6H PRN   Or  acetaminophen, 650 mg, Q6H PRN  dextrose bolus, 125 mL, PRN   Or  dextrose bolus, 250 mL, PRN  glucagon (rDNA), 1 mg, PRN  dextrose, , Continuous PRN  potassium chloride, 10 mEq, PRN  magnesium sulfate, 2,000 mg,

## 2025-03-16 NOTE — PROGRESS NOTES
CM-SR-ST, VSS, pt remains afebrile, UO WNL. Pt is alert but confused. Pt restless throughout the day ad c/o back and side discomfort.  Pt medicated with PRN's with no results.  Order received for Haldol 2mg IV X1. Pt continues to remain restless.  Pt is resting on 4 liters per NC. Will continue to monitor and wean O2 as tolerated.

## 2025-03-16 NOTE — PROGRESS NOTES
Pulmonary & Critical Care Medicine ICU Progress Note    CC: COPD respiratory failure    Events of Last 24 hours:  BiPAP 15/5 50% - 2hrs   6 L  Secretions better       Vascular lines: IV: Left IJ 2/14-2/26. R Midline 2/25    MV: 2/14-3/4/25  Vent Mode: AC/VC Resp Rate (Set): 0 bpm/Vt (Set, mL): 0 mL/ /FiO2 : 40 %  No results for input(s): \"PHART\", \"QYY6MQL\", \"PO2ART\" in the last 72 hours.        IV:   sodium chloride      sodium chloride      dextrose         Vitals:  Blood pressure (!) 119/56, pulse 99, temperature 98.1 °F (36.7 °C), temperature source Bladder, resp. rate 15, height 1.702 m (5' 7.01\"), weight 79.1 kg (174 lb 6.1 oz), SpO2 99%.  on 6 L    EXAM:  General: ill appearing.    Eyes: No sclera icterus. No conjunctival injection.  ENT: No discharge.   Neck: Trachea midline.   Resp: No accessory muscle use. Few crackles. No wheezing. Few rhonchi.   CV: Regular  rate. Regular rhythm. No mumur or rub. No edema.   GI: Non-tender. Non-distended.   Skin: Warm and dry. No rash on exposed extremities.  M/S: No cyanosis. No clubbing.   Neuro: + following commands.  Good cough.   Psych: lethargic today        Scheduled Meds:   lactulose  30 g Oral BID    sodium chloride (Inhalant)  4 mL Nebulization BID RT    QUEtiapine  50 mg Oral BID    thiamine  100 mg Oral Daily    rifAXIMin  550 mg Oral BID    zonisamide  50 mg Oral BID    Gabapentin  200 mg Per NG tube Q6H    DULoxetine  30 mg Oral Daily    cloNIDine  1 patch TransDERmal Weekly    CENTRUM/CERTA-KAYKAY with minerals oral  15 mL Oral Daily    dilTIAZem  60 mg Oral 3 times per day    metoprolol tartrate  50 mg Oral BID    polyethylene glycol  17 g Oral Daily    sodium chloride flush  5-40 mL IntraVENous 2 times per day    insulin lispro  0-16 Units SubCUTAneous Q4H    albuterol  2.5 mg Nebulization 4x Daily RT    pantoprazole (PROTONIX) 40 mg in sodium chloride (PF) 0.9 % 10 mL injection  40 mg IntraVENous Daily    enoxaparin  40 mg SubCUTAneous Daily    allopurinol

## 2025-03-16 NOTE — PROGRESS NOTES
03/16/25 0000   NIV Type   Equipment Type v60   Mode Bilevel   Mask Type Full face mask   Mask Size Extra large   Assessment   Pulse 81   Respirations 18   SpO2 100 %   Settings/Measurements   IPAP 15 cmH20   CPAP/EPAP 5 cmH2O   Vt (Measured) 514 mL   Rate Ordered 18   FiO2  40 %   Minute Volume (L/min) 9.3 Liters   Mask Leak (lpm) 48 lpm   Patient's Home Machine No   Patient Observation   Patient Observations spo2 99% on 40% bipap

## 2025-03-16 NOTE — PROGRESS NOTES
NAME:  Sandor Early  YOB: 1955  MEDICAL RECORD NUMBER:  [id number]    Shift Summary:   - 6 to 7 Liquid, brown/tan BM's  - Haldol x 1 - aggressive, trying to get OOB, pulling off BiPAP even with sitter redirecting   - Haldol worked well to control/manage aggression   - refused BiPAP after 1H     Family updated: No    Rhythm: Normal Sinus Rhythm / ST     Most recent vitals:   Visit Vitals  BP (!) 119/56   Pulse 99   Temp 98.1 °F (36.7 °C) (Bladder)   Resp 15   Ht 1.702 m (5' 7.01\")   Wt 79.1 kg (174 lb 6.1 oz)   SpO2 99%   BMI 27.31 kg/m²             Respiratory support needed (if any):  - O2 - NC - 4 lpm    Admission weight Weight - Scale: 91.6 kg (201 lb 15.1 oz) (01/30/25 2217)    Today's weight    Wt Readings from Last 1 Encounters:   03/16/25 79.1 kg (174 lb 6.1 oz)        Chavira need assessed each shift: YES -  - continue chavira r/t - retentions   UOP >30ml/hr: YES  Last documented BM (in last 48 hrs):  Patient Vitals for the past 48 hrs:   Last BM (including prior to admit) Stool Occurrence   03/14/25 1000 03/14/25 1   03/14/25 1100 03/14/25 1   03/14/25 1140 -- 1   03/14/25 1800 -- 1   03/14/25 1801 03/14/25 1   03/14/25 1900 03/14/25 --   03/14/25 2128 03/14/25 --   03/15/25 0000 03/15/25 --   03/15/25 0100 03/15/25 --   03/15/25 0326 03/15/25 --   03/15/25 2000 03/15/25 --   03/15/25 2300 03/15/25 1   03/16/25 0000 03/16/25 1   03/16/25 0016 03/16/25 1   03/16/25 0032 03/16/25 1   03/16/25 0100 03/16/25 1   03/16/25 0105 03/16/25 1   03/16/25 0150 03/16/25 1                    Lines/Drains reviewed @ bedside.  Peripheral IV 03/11/25 Right Basilic (Active)   Number of days: 4       Urinary Catheter 03/08/25 Chavira-Temperature (Active)   Number of days: 7         Drip rates at handoff:    sodium chloride      sodium chloride      dextrose         Lab Data:   CBC:   Recent Labs     03/15/25  0425 03/16/25  0514   WBC 9.9 9.2   HGB 10.2* 9.5*   HCT 31.9* 29.2*   MCV 88.2 86.8    331

## 2025-03-16 NOTE — PROGRESS NOTES
03/16/25 1900   RT Protocol   History Pulmonary Disease 2   Respiratory pattern 2   Breath sounds 6   Cough 1   Indications for Bronchodilator Therapy Decreased or absent breath sounds   Bronchodilator Assessment Score 11     RT Inhaler-Nebulizer Bronchodilator Protocol Note    There is a bronchodilator order in the chart from a provider indicating to follow the RT Bronchodilator Protocol and there is an “Initiate RT Inhaler-Nebulizer Bronchodilator Protocol” order as well (see protocol at bottom of note).    CXR Findings:  No results found.    The findings from the last RT Protocol Assessment were as follows:   History Pulmonary Disease: Chronic pulmonary disease  Respiratory Pattern: Dyspnea on exertion or RR 21-25 bpm  Breath Sounds: Inspiratory and expiratory or bilateral wheezing and/or rhonchi  Cough: Strong, productive  Indication for Bronchodilator Therapy: Decreased or absent breath sounds  Bronchodilator Assessment Score: 11    Aerosolized bronchodilator medication orders have been revised according to the RT Inhaler-Nebulizer Bronchodilator Protocol below.    Respiratory Therapist to perform RT Therapy Protocol Assessment initially then follow the protocol.  Repeat RT Therapy Protocol Assessment PRN for score 0-3 or on second treatment, BID, and PRN for scores above 3.    No Indications - adjust the frequency to every 6 hours PRN wheezing or bronchospasm, if no treatments needed after 48 hours then discontinue using Per Protocol order mode.     If indication present, adjust the RT bronchodilator orders based on the Bronchodilator Assessment Score as indicated below.  Use Inhaler orders unless patient has one or more of the following: on home nebulizer, not able to hold breath for 10 seconds, is not alert and oriented, cannot activate and use MDI correctly, or respiratory rate 25 breaths per minute or more, then use the equivalent nebulizer order(s) with same Frequency and PRN reasons based on the

## 2025-03-17 PROBLEM — F05 DELIRIUM DUE TO ANOTHER MEDICAL CONDITION, PERSISTENT, HYPERACTIVE: Status: ACTIVE | Noted: 2025-03-17

## 2025-03-17 LAB
ANION GAP SERPL CALCULATED.3IONS-SCNC: 5 MMOL/L (ref 3–16)
BUN SERPL-MCNC: 13 MG/DL (ref 7–20)
CALCIUM SERPL-MCNC: 9.1 MG/DL (ref 8.3–10.6)
CHLORIDE SERPL-SCNC: 94 MMOL/L (ref 99–110)
CO2 SERPL-SCNC: 37 MMOL/L (ref 21–32)
CREAT SERPL-MCNC: 0.6 MG/DL (ref 0.8–1.3)
GFR SERPLBLD CREATININE-BSD FMLA CKD-EPI: >90 ML/MIN/{1.73_M2}
GLUCOSE BLD-MCNC: 113 MG/DL (ref 70–99)
GLUCOSE BLD-MCNC: 119 MG/DL (ref 70–99)
GLUCOSE BLD-MCNC: 133 MG/DL (ref 70–99)
GLUCOSE BLD-MCNC: 137 MG/DL (ref 70–99)
GLUCOSE BLD-MCNC: 138 MG/DL (ref 70–99)
GLUCOSE BLD-MCNC: 139 MG/DL (ref 70–99)
GLUCOSE SERPL-MCNC: 127 MG/DL (ref 70–99)
PERFORMED ON: ABNORMAL
POTASSIUM SERPL-SCNC: 4.1 MMOL/L (ref 3.5–5.1)
SODIUM SERPL-SCNC: 136 MMOL/L (ref 136–145)

## 2025-03-17 PROCEDURE — 80048 BASIC METABOLIC PNL TOTAL CA: CPT

## 2025-03-17 PROCEDURE — 6360000002 HC RX W HCPCS: Performed by: INTERNAL MEDICINE

## 2025-03-17 PROCEDURE — 99233 SBSQ HOSP IP/OBS HIGH 50: CPT | Performed by: INTERNAL MEDICINE

## 2025-03-17 PROCEDURE — 6370000000 HC RX 637 (ALT 250 FOR IP): Performed by: INTERNAL MEDICINE

## 2025-03-17 PROCEDURE — 36415 COLL VENOUS BLD VENIPUNCTURE: CPT

## 2025-03-17 PROCEDURE — 6370000000 HC RX 637 (ALT 250 FOR IP): Performed by: NEUROMUSCULOSKELETAL MEDICINE & OMM

## 2025-03-17 PROCEDURE — 92526 ORAL FUNCTION THERAPY: CPT

## 2025-03-17 PROCEDURE — 2580000003 HC RX 258: Performed by: INTERNAL MEDICINE

## 2025-03-17 PROCEDURE — 2500000003 HC RX 250 WO HCPCS: Performed by: INTERNAL MEDICINE

## 2025-03-17 PROCEDURE — 2000000000 HC ICU R&B

## 2025-03-17 PROCEDURE — 94660 CPAP INITIATION&MGMT: CPT

## 2025-03-17 PROCEDURE — 2700000000 HC OXYGEN THERAPY PER DAY

## 2025-03-17 PROCEDURE — 94761 N-INVAS EAR/PLS OXIMETRY MLT: CPT

## 2025-03-17 PROCEDURE — 97530 THERAPEUTIC ACTIVITIES: CPT

## 2025-03-17 PROCEDURE — 94669 MECHANICAL CHEST WALL OSCILL: CPT

## 2025-03-17 PROCEDURE — 6370000000 HC RX 637 (ALT 250 FOR IP): Performed by: STUDENT IN AN ORGANIZED HEALTH CARE EDUCATION/TRAINING PROGRAM

## 2025-03-17 PROCEDURE — 94640 AIRWAY INHALATION TREATMENT: CPT

## 2025-03-17 PROCEDURE — 99223 1ST HOSP IP/OBS HIGH 75: CPT | Performed by: PSYCHIATRY & NEUROLOGY

## 2025-03-17 RX ORDER — THIAMINE HYDROCHLORIDE 100 MG/ML
100 INJECTION, SOLUTION INTRAMUSCULAR; INTRAVENOUS DAILY
Status: DISCONTINUED | OUTPATIENT
Start: 2025-03-18 | End: 2025-03-25 | Stop reason: HOSPADM

## 2025-03-17 RX ADMIN — PANTOPRAZOLE SODIUM 40 MG: 40 INJECTION, POWDER, LYOPHILIZED, FOR SOLUTION INTRAVENOUS at 08:30

## 2025-03-17 RX ADMIN — GABAPENTIN 200 MG: 250 SOLUTION ORAL at 08:29

## 2025-03-17 RX ADMIN — Medication 4 ML: at 08:06

## 2025-03-17 RX ADMIN — DILTIAZEM HYDROCHLORIDE 60 MG: 60 TABLET ORAL at 20:23

## 2025-03-17 RX ADMIN — ALBUTEROL SULFATE 2.5 MG: 2.5 SOLUTION RESPIRATORY (INHALATION) at 15:41

## 2025-03-17 RX ADMIN — ATORVASTATIN CALCIUM 40 MG: 40 TABLET, FILM COATED ORAL at 08:29

## 2025-03-17 RX ADMIN — RIFAXIMIN 550 MG: 550 TABLET ORAL at 08:29

## 2025-03-17 RX ADMIN — GABAPENTIN 200 MG: 250 SOLUTION ORAL at 03:43

## 2025-03-17 RX ADMIN — ZONISAMIDE 50 MG: 25 CAPSULE ORAL at 09:39

## 2025-03-17 RX ADMIN — GABAPENTIN 200 MG: 250 SOLUTION ORAL at 20:23

## 2025-03-17 RX ADMIN — ENOXAPARIN SODIUM 40 MG: 100 INJECTION SUBCUTANEOUS at 08:29

## 2025-03-17 RX ADMIN — SODIUM CHLORIDE, PRESERVATIVE FREE 10 ML: 5 INJECTION INTRAVENOUS at 23:26

## 2025-03-17 RX ADMIN — DULOXETINE HYDROCHLORIDE 30 MG: 30 CAPSULE, DELAYED RELEASE ORAL at 08:29

## 2025-03-17 RX ADMIN — Medication 100 MG: at 08:29

## 2025-03-17 RX ADMIN — ALLOPURINOL 300 MG: 300 TABLET ORAL at 08:29

## 2025-03-17 RX ADMIN — METOPROLOL TARTRATE 50 MG: 50 TABLET, FILM COATED ORAL at 20:25

## 2025-03-17 RX ADMIN — Medication 15 ML: at 09:39

## 2025-03-17 RX ADMIN — OXYCODONE 5 MG: 5 TABLET ORAL at 23:59

## 2025-03-17 RX ADMIN — DILTIAZEM HYDROCHLORIDE 60 MG: 60 TABLET ORAL at 06:10

## 2025-03-17 RX ADMIN — Medication 5000 UNITS: at 08:29

## 2025-03-17 RX ADMIN — QUETIAPINE FUMARATE 50 MG: 25 TABLET ORAL at 20:24

## 2025-03-17 RX ADMIN — ALBUTEROL SULFATE 2.5 MG: 2.5 SOLUTION RESPIRATORY (INHALATION) at 11:45

## 2025-03-17 RX ADMIN — RIFAXIMIN 550 MG: 550 TABLET ORAL at 20:24

## 2025-03-17 RX ADMIN — Medication 4 ML: at 19:54

## 2025-03-17 RX ADMIN — OXYCODONE 5 MG: 5 TABLET ORAL at 16:06

## 2025-03-17 RX ADMIN — SODIUM CHLORIDE, PRESERVATIVE FREE 10 ML: 5 INJECTION INTRAVENOUS at 08:38

## 2025-03-17 RX ADMIN — ALBUTEROL SULFATE 2.5 MG: 2.5 SOLUTION RESPIRATORY (INHALATION) at 08:06

## 2025-03-17 RX ADMIN — METOPROLOL TARTRATE 50 MG: 50 TABLET, FILM COATED ORAL at 08:29

## 2025-03-17 RX ADMIN — GABAPENTIN 200 MG: 250 SOLUTION ORAL at 15:44

## 2025-03-17 RX ADMIN — DILTIAZEM HYDROCHLORIDE 60 MG: 60 TABLET ORAL at 15:44

## 2025-03-17 RX ADMIN — ZONISAMIDE 50 MG: 25 CAPSULE ORAL at 20:23

## 2025-03-17 RX ADMIN — QUETIAPINE FUMARATE 50 MG: 25 TABLET ORAL at 08:29

## 2025-03-17 RX ADMIN — ALBUTEROL SULFATE 2.5 MG: 2.5 SOLUTION RESPIRATORY (INHALATION) at 19:54

## 2025-03-17 ASSESSMENT — PAIN DESCRIPTION - LOCATION
LOCATION: FACE;BACK
LOCATION: BACK

## 2025-03-17 ASSESSMENT — PAIN SCALES - GENERAL
PAINLEVEL_OUTOF10: 0
PAINLEVEL_OUTOF10: 6
PAINLEVEL_OUTOF10: 7

## 2025-03-17 ASSESSMENT — PAIN DESCRIPTION - DESCRIPTORS: DESCRIPTORS: ACHING

## 2025-03-17 ASSESSMENT — PAIN DESCRIPTION - ORIENTATION: ORIENTATION: MID

## 2025-03-17 NOTE — PROGRESS NOTES
[facility]                            Critical care Progress Note :    CC: follow on RF post vent     Events of Last 24 hours:  BiPAP 15/5 50% - 2hrs   Down to 2 L   Secretions better   Still cannot swallow   Some agitation at night   Vascular lines: IV: Left IJ 2/14-2/26. R Midline 2/25          IV:   sodium chloride      sodium chloride      dextrose         Vitals:  Blood pressure 118/65, pulse 99, temperature 98.9 °F (37.2 °C), temperature source Bladder, resp. rate 17, height 1.702 m (5' 7.01\"), weight 81.3 kg (179 lb 3.7 oz), SpO2 96%.  on 6 L    EXAM:  General: ill appearing.    Eyes: No sclera icterus. No conjunctival injection.  ENT: No discharge.   Neck: Trachea midline.   Resp: No accessory muscle use. Few crackles. No wheezing. Few rhonchi.   CV: Regular  rate. Regular rhythm. No mumur or rub. No edema.   GI: Non-tender. Non-distended.   Skin: Warm and dry. No rash on exposed extremities.  M/S: No cyanosis. No clubbing.   Neuro: + following commands.  Good cough.   Psych: lethargic today        Scheduled Meds:   lactulose  30 g Oral Daily    sodium chloride (Inhalant)  4 mL Nebulization BID RT    QUEtiapine  50 mg Oral BID    thiamine  100 mg Oral Daily    rifAXIMin  550 mg Oral BID    zonisamide  50 mg Oral BID    Gabapentin  200 mg Per NG tube Q6H    DULoxetine  30 mg Oral Daily    cloNIDine  1 patch TransDERmal Weekly    CENTRUM/CERTA-KAYKAY with minerals oral  15 mL Oral Daily    dilTIAZem  60 mg Oral 3 times per day    metoprolol tartrate  50 mg Oral BID    polyethylene glycol  17 g Oral Daily    sodium chloride flush  5-40 mL IntraVENous 2 times per day    insulin lispro  0-16 Units SubCUTAneous Q4H    albuterol  2.5 mg Nebulization 4x Daily RT    pantoprazole (PROTONIX) 40 mg in sodium chloride (PF) 0.9 % 10 mL injection  40 mg IntraVENous Daily    enoxaparin  40 mg SubCUTAneous Daily    allopurinol  300 mg Oral Daily    vitamin D3  5,000 Units

## 2025-03-17 NOTE — PROGRESS NOTES
Called [facility] for consult, pt has Hx with Gastro Electronically signed by Yari Watson on 3/17/2025 at 2:07 PM

## 2025-03-17 NOTE — PROGRESS NOTES
Consult sent to psychiatry for consult Electronically signed by Yari Watson on 3/17/2025 at 8:18 AM

## 2025-03-17 NOTE — PROGRESS NOTES
03/16/25 2325   NIV Type   Equipment Type v60   Mode Bilevel   Mask Type Full face mask   Mask Size Extra large   Assessment   Pulse 89   Respirations 17   SpO2 99 %   Settings/Measurements   IPAP 15 cmH20   CPAP/EPAP 5 cmH2O   Vt (Measured) 475 mL   Rate Ordered 18   FiO2  40 %   Minute Volume (L/min) 10.2 Liters   Mask Leak (lpm) 21 lpm   Patient's Home Machine No   Patient Observation   Patient Observations spo2 99% on 40% bipap

## 2025-03-17 NOTE — PROGRESS NOTES
03/17/25 0309   NIV Type   Equipment Type v60   Mode Bilevel   Mask Type Full face mask   Mask Size Extra large   Assessment   Pulse 93   Respirations 14   SpO2 99 %   Settings/Measurements   IPAP 15 cmH20   CPAP/EPAP 5 cmH2O   Vt (Measured) 438 mL   Rate Ordered 18   FiO2  40 %   Minute Volume (L/min) 7.9 Liters   Mask Leak (lpm) 21 lpm   Patient's Home Machine No   Patient Observation   Patient Observations spo2 99% on 40% bipap

## 2025-03-17 NOTE — PLAN OF CARE
Problem: Chronic Conditions and Co-morbidities  Goal: Patient's chronic conditions and co-morbidity symptoms are monitored and maintained or improved  Outcome: Progressing  Flowsheets (Taken 3/16/2025 2000)  Care Plan - Patient's Chronic Conditions and Co-Morbidity Symptoms are Monitored and Maintained or Improved:   Monitor and assess patient's chronic conditions and comorbid symptoms for stability, deterioration, or improvement   Collaborate with multidisciplinary team to address chronic and comorbid conditions and prevent exacerbation or deterioration   Update acute care plan with appropriate goals if chronic or comorbid symptoms are exacerbated and prevent overall improvement and discharge   Pt Remains very confused and hallucinating- Talking to people in the room that he sees.  Problem: Safety - Adult  Goal: Free from fall injury  Outcome: Progressing   Pt is safe with a  at the bedside.  Problem: Pain  Goal: Verbalizes/displays adequate comfort level or baseline comfort level  Outcome: Progressing   Pt seems to have adequate relief from pain with the oxycodone.  Problem: Respiratory - Adult  Goal: Achieves optimal ventilation and oxygenation  Outcome: Progressing  Flowsheets (Taken 3/16/2025 1600)  Achieves optimal ventilation and oxygenation:   Assess for changes in respiratory status   Assess for changes in mentation and behavior   Position to facilitate oxygenation and minimize respiratory effort   Oxygen supplementation based on oxygen saturation or arterial blood gases   Initiate smoking cessation protocol as indicated   Encourage broncho-pulmonary hygiene including cough, deep breathe, incentive spirometry   Assess the need for suctioning and aspirate as needed   Assess and instruct to report shortness of breath or any respiratory difficulty   Respiratory therapy support as indicated   Trialed pt on room air and pt desated into the low 80's.

## 2025-03-17 NOTE — PROGRESS NOTES
Inpatient Occupational Therapy Treatment    Unit: ICU  Date:  3/17/2025  Patient Name:    Sandor Early  Admitting diagnosis:  Hypomagnesemia [E83.42]  Influenza A [J10.1]  COPD exacerbation (HCC) [J44.1]  Respiratory failure with hypoxia (HCC) [J96.91]  Alcohol use disorder [F10.90]  Acute on chronic respiratory failure with hypoxia and hypercapnia (HCC) [J96.21, J96.22]  Acute respiratory failure (HCC) [J96.00]  Admit Date:  1/30/2025  Precautions/Restrictions/WB Status/ Lines/ Wounds/ Oxygen: Fall risk, Bed/chair alarm, Lines (IV, Supplemental O2 (2L), internal catheter, and NG tube), Confusion, Telemetry, Continuous pulse oximetry, Telesitter, and Isolation Precautions: Contact; no BP on the RUE. Sitter present in room.     Pt seen for cotreatment this date due to patient safety, patient endurance, acute illness/injury, and need for the assistance of 2 skilled therapists    Treatment Time:  10:30-11:10  Treatment Number:  3  Timed Code Treatment Minutes: 40 minutes  Total Treatment Minutes:  40  minutes    Patient Goals for Therapy: none stated          Discharge Recommendations: SNF  DME needs for discharge: Defer to facility       Therapy recommendations for staff:   Assist of 2 for transfers with use of VALERY STEDY to/from chair depending on cognition and behaviors    History of Present Illness: per H&P   Sandor is a 69 y.o. male with a history of COPD on 4 L of O2 chronically, GERD, hyperlipidemia, alcohol liver cirrhosis, who presents to the emergency department by EMS for shortness of breath.  Patient reports that he has felt short of breath with increasing cough and fever over the past 3 to 4 days.  Upon EMS arrival patient was in moderate to severe respiratory distress O2 sat 88%.  He was placed on O2.  He received Solu-Medrol and DuoNeb.  EMS team stated that he was agitated trying to get out of the squad and complaining of diffuse pain back legs etc. he has a history of neuropathy.  They then gave

## 2025-03-17 NOTE — PROGRESS NOTES
4 Eyes Skin Assessment     NAME:  Sandor Early  YOB: 1955  MEDICAL RECORD NUMBER:  [id number]    The patient is being assessed for  Shift Handoff    I agree that at least one RN has performed a thorough Head to Toe Skin Assessment on the patient. ALL assessment sites listed below have been assessed.      Areas assessed by both nurses:    Head, Face, Ears, Shoulders, Back, Chest, Arms, Elbows, Hands, Sacrum. Buttock, Coccyx, Ischium, Legs. Feet and Heels, and Under Medical Devices         Does the Patient have a Wound? No noted wound(s)       Abraham Prevention initiated by RN: yes  Wound Care Orders initiated by RN: No    Pressure Injury (Stage 3,4, Unstageable, DTI, NWPT, and Complex wounds) if present, place Wound referral order by RN under : No    New Ostomies, if present place, Ostomy referral order under : No     Nurse 1 eSignature: Electronically signed by Viki Tran RN on 3/17/25 at 5:45 AM EDT    **SHARE this note so that the co-signing nurse can place an eSignature**    Nurse 2 eSignature: Electronically signed by Maile Diego RN on 3/17/25 at 6:26 AM EDT

## 2025-03-17 NOTE — PROGRESS NOTES
03/17/25 0800   RT Protocol   History Pulmonary Disease 2   Respiratory pattern 2   Breath sounds 4   Cough 2   Indications for Bronchodilator Therapy Decreased or absent breath sounds   Bronchodilator Assessment Score 10

## 2025-03-17 NOTE — PROGRESS NOTES
Exam:  General: elderly male ill appearing, confused in bed  Awake and answers some questions  Mucous Membranes:  Pink , anicteric  Neck: No JVD, no carotid bruit, no thyromegaly  NG tube noted   Chest diminished in bases  No accessory muscle use.  Minimal wheezing.  Few rhonchi.  Cardiovascular:  RRR S1S2 heard, no murmurs or gallops  Abdomen:  Soft, obese un distended, non tender, no organomegaly, BS +  Extremities: trace edema to ext improved   Distal pulses well felt  Neurological awake and alert. Moves upper ext with ease but not lower ext.     cations:  lactulose, 30 g, Daily  sodium chloride (Inhalant), 4 mL, BID RT  QUEtiapine, 50 mg, BID  thiamine, 100 mg, Daily  rifAXIMin, 550 mg, BID  zonisamide, 50 mg, BID  Gabapentin, 200 mg, Q6H  DULoxetine, 30 mg, Daily  cloNIDine, 1 patch, Weekly  CENTRUM/CERTA-KAYKAY with minerals oral, 15 mL, Daily  dilTIAZem, 60 mg, 3 times per day  metoprolol tartrate, 50 mg, BID  polyethylene glycol, 17 g, Daily  sodium chloride flush, 5-40 mL, 2 times per day  insulin lispro, 0-16 Units, Q4H  albuterol, 2.5 mg, 4x Daily RT  pantoprazole (PROTONIX) 40 mg in sodium chloride (PF) 0.9 % 10 mL injection, 40 mg, Daily  enoxaparin, 40 mg, Daily  allopurinol, 300 mg, Daily  vitamin D3, 5,000 Units, Daily  atorvastatin, 40 mg, Daily      PRN Medications:  diazePAM, 5 mg, Q6H PRN  hydrALAZINE, 10 mg, Q6H PRN  oxyCODONE, 5 mg, Q8H PRN  potassium chloride, 40 mEq, PRN   Or  potassium alternative oral replacement, 40 mEq, PRN  prochlorperazine, 10 mg, Q8H PRN  albuterol, 2.5 mg, Q4H PRN  carboxymethylcellulose PF, 1 drop, PRN  sodium chloride flush, 5-40 mL, PRN  sodium chloride, , PRN  diatrizoate meglumine-sodium, 12 mL, ONCE PRN  guaiFENesin, 200 mg, Q4H PRN  benzocaine, , 4x Daily PRN  phenol, 1 spray, Q2H PRN  sodium chloride, , PRN  acetaminophen, 650 mg, Q6H PRN   Or  acetaminophen, 650 mg, Q6H PRN  dextrose bolus, 125 mL, PRN   Or  dextrose bolus, 250 mL, PRN  glucagon (rDNA), 1 mg,  vent support   -Over the course of his hospitalization he has received vancomycin, Zosyn, Diflucan, Z-Zev and Tamiflu.   sputum with MRSA    - repeat steroids , HHn given- improved slowly and now off vent   -pulmonary managing   -He was extubated on 3/4/2025.  -On prn Haldol. Using Bipap as needed. -Mentation some improved. MBS recommended. -Having thick secretions.   Has NG and getting Tube feeds now   biPAP at night   Now on 4 L   -Consider PEG tube if he continues to have trouble with swallowing.  -Psychiatry consulted given continued agitation.    Enteritis with partial SBO resolved  -Ct with enteritis and also has several metabolic issues like above  -NG suction, NPO. Bowel rest given with no change  -General surgery consulted   -Enema and suppository with no benefit,  -given Zosyn, diflucan, vanc ,now off abx   -started TPN support with no return of bowel function.   -Now SBO slowly resolved.   -Monitored electrolytes and replaced as needed  -advance TF to goal rate    Acute metabolic encephalopathy   Developed confusion initially with alcohol withdrawal and possible seizure , later on vent support leading to post extubation deliri um   -supportive care. Ct head neg  -MRI brain ordered but did not tolerate, was incomplete +motion artifact  -had agitation and confusion noted needing precedex   -on zonisamide per neurology  Tapered off Keppra   - resume home gabapentin and oxycodone at lower dose to help with withdrawal symptoms  - off all sedative meds  - seroquel/haldol now for agitation   - keep off precedex  -continues to be confused  In restraints   Now off restraints,no telesitter      Focal seizure involving LUE, history of childhood epilepsy   -Neurology consulted  -Seizure precautions  -EEG with mild gen slowing  -Started on keppra by neuro- switched to zonisamide temporarily to lower risk of possible behavioral issues.  Off Keppra     Normocytic anemia   -likely nutritional and iatrogenic  -Hgb 7.3 >7

## 2025-03-17 NOTE — PROGRESS NOTES
RT Inhaler-Nebulizer Bronchodilator Protocol Note    There is a bronchodilator order in the chart from a provider indicating to follow the RT Bronchodilator Protocol and there is an “Initiate RT Inhaler-Nebulizer Bronchodilator Protocol” order as well (see protocol at bottom of note).    CXR Findings:  No results found.    The findings from the last RT Protocol Assessment were as follows:   History Pulmonary Disease: (P) Chronic pulmonary disease  Respiratory Pattern: (P) Dyspnea on exertion or RR 21-25 bpm  Breath Sounds: (P) Slightly diminished and/or crackles  Cough: (P) Weak, productive  Indication for Bronchodilator Therapy: (P) Decreased or absent breath sounds  Bronchodilator Assessment Score: (P) 8    Aerosolized bronchodilator medication orders have been revised according to the RT Inhaler-Nebulizer Bronchodilator Protocol below.    Respiratory Therapist to perform RT Therapy Protocol Assessment initially then follow the protocol.  Repeat RT Therapy Protocol Assessment PRN for score 0-3 or on second treatment, BID, and PRN for scores above 3.    No Indications - adjust the frequency to every 6 hours PRN wheezing or bronchospasm, if no treatments needed after 48 hours then discontinue using Per Protocol order mode.     If indication present, adjust the RT bronchodilator orders based on the Bronchodilator Assessment Score as indicated below.  Use Inhaler orders unless patient has one or more of the following: on home nebulizer, not able to hold breath for 10 seconds, is not alert and oriented, cannot activate and use MDI correctly, or respiratory rate 25 breaths per minute or more, then use the equivalent nebulizer order(s) with same Frequency and PRN reasons based on the score.  If a patient is on this medication at home then do not decrease Frequency below that used at home.    0-3 - enter or revise RT bronchodilator order(s) to equivalent RT Bronchodilator order with Frequency of every 4 hours PRN for

## 2025-03-17 NOTE — PROGRESS NOTES
Speech Language Pathology  Swallowing Disorders and Dysphagia    Dysphagia Treatment/Follow-Up Note  [place] [facility]    Recommendations: SLP recommends to continue NPO except ice chips/small  sips water via cup or straw with SLP/RN only (oral care must be completed prior) for comfort and to combat disuse atrophy; Meds via alt means of nutrition.   Recommend instrumental swallow assessment at this time as pt is less agitated and voices willingness to participate. Order placed. Meds via alt means of nutrition.    Recommend timing meds to be given prior to MBS and pulling large bore NGT prior to study to optimize swallow and ability to clear pharynx  Risk Management:  upright for all intake, stay upright for at least 30 mins after intake, small bites/sips, total feed, 1:1 supervision with intake, oral care q4 hrs to reduce adverse affects in the event of aspiration, increase physical mobility as able, slow rate of intake, STRICT aspiration precautions, and hold PO and contact SLP if s/s of aspiration or worsening respiratory status develop.     NAME:Sandor Early  : 1955 (69 y.o.)   MRN: [id number]  ROOM: 3008/3008-01  ADMISSION DATE: 2025  PATIENT DIAGNOSIS(ES): Hypomagnesemia [E83.42]  Influenza A [J10.1]  COPD exacerbation (HCC) [J44.1]  Respiratory failure with hypoxia (HCC) [J96.91]  Alcohol use disorder [F10.90]  Acute on chronic respiratory failure with hypoxia and hypercapnia (HCC) [J96.21, J96.22]  Acute respiratory failure (HCC) [J96.00]  Allergies   Allergen Reactions    Lisinopril Swelling and Other (See Comments)    Ace Inhibitors Swelling and Other (See Comments)    Influenza Vaccines Other (See Comments)     He states he was hospitalized when he received his first flu vaccine    Tiotropium Bromide Monohydrate Swelling and Other (See Comments)     Tolerates both tudorza and incruse  On Trelegy.    Vilanterol        DATE ONSET: 2025    Pain: The patient does not

## 2025-03-17 NOTE — CARE COORDINATION
Patient will need PEG to discharge to [facility]. Family still considering PEG. Patient with confusion. Telesitter.    [facility] for Shamika/ANCELMO to discuss plan.     Spoke with Shamika. Prior to being discharged to [facility] the patient will need:    PEG tube   Telesitter off for 24-48 hours  Behavioral issues resolved

## 2025-03-17 NOTE — PROGRESS NOTES
AM assessment complete, see flowsheet. Medications given per MAR. Pt Alert and oriented to self only. Intermittent confusion to situation and place. Pt compliant with staff at this time, but remains restless and anxious. IV lines checked and tightended. VSS. On 4L NC. TF infusing at goal. Psych eval planned for today. No other needs noted, awaiting MD rounds.

## 2025-03-17 NOTE — CONSULTS
Psychiatric Consult     Dx: Delirium secondary to medical complications         Alcohol Dependence r/o Wernicke's    Rec:   Patient appeared to be relatively calm today . He was oriented to person, , year, month and age. He struggled to tell me why he was in the hospital. He was not aggressive but appeared mildly agitated.   Continue with Seroquel scheduled 50 mg BID unless his agitation worsens and then consider Olanzapine 5 mg BID . His QTc 417.   Due to chronic alcohol use Thiamine 100 mg IV Daily might be more effective than po for Wernicke's although this clinical presentation appears more related to delirium related to medicla illness.   Will follow       Admit Date:  2025    Consult Date:  3/17/2025   Id Info: 70 yo WM   Reason for Consult: agitation  Summary Present Illness:    69 y.o. 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 Admitted for acute on chronic hypoxic and hypercarbic respiratory failure due to influenza A and COPD exacerbation, partial small bowel obstruction versus ileus.  He has a notable history of Whyte and alcoholic cirrhosis diabetic neuropathy, peripheral vascular disease, chronic pain, and chronic diastolic heart failure.       Per Neuro  His first neurological evaluation was on 2025 after the patient was noted by physical therapy to have seizure-like activity for about 2 minutes as he was  have seizure-like activity for about 2 minutes as he was sitting at EOB when he developed suddenly started with seizure-like tremors in bilat UEs, trunk, and head. He stated that he was \"having another seizure\". Writer assisted him back into bed quickly and patient cont with tremors and closed his eyes. He responded at all times to strong verbal cues.      He had another seizure-like event 2/7 evening and afterwards was able to voice demands.     Started on  Keppra 2000 mg IV bolus then 1000 mg IV twice daily maintenance dose.  He is having GI issues now so once those resolve then he can switch from IV to p.o.  On 2/13 Switch levetiracetam to zonisamide to lower the risk of behavioral disturbance.   Then again back on Levetiracetam Via NG tube 750 mg bd  And  gabapentin 200 mg tid      Last documented seizure-like episode was on 2/7.       Hospital course has been further complicated by acute respiratory failure requiring BiPAP then later weaned to nasal cannula oxygen; enteritis with partial small bowel obstruction treated with nasogastric suction, n.p.o., Zosyn, Diflucan, vancomycin, TPN.   2/7 EEG = moderate generalized slowing     2/7 noncontrast head CT = cerebral atrophy, no acute abnormality  He did not tolerate the MRI study.  This resulted in incomplete study and motion artifact.  Xanax was ordered for the MRI study but it was not given presumably due to respiratory issues.     Repeat EEG February 11, 2025 l showed significant encephalopathy.;  This 25-minute, computer-assisted video EEG recording is abnormal.  It showed moderate to severe continuous generalized slowing background activity.  There was no clear-cut epileptiform discharge in the study.  The EEG findings were consistent with moderate to severe nonspecific generalized cerebral dysfunction or medication effect.      Ammonia level 23 on 2/12/25    Long history of alcohol abuse.   Was intubated two times.         Psychiatric Hx: Hosp: none known

## 2025-03-18 ENCOUNTER — APPOINTMENT (OUTPATIENT)
Dept: GENERAL RADIOLOGY | Age: 70
DRG: 207 | End: 2025-03-18
Payer: MEDICARE

## 2025-03-18 PROBLEM — E11.40 PAINFUL DIABETIC NEUROPATHY (HCC): Status: ACTIVE | Noted: 2025-03-18

## 2025-03-18 LAB
ANION GAP SERPL CALCULATED.3IONS-SCNC: 8 MMOL/L (ref 3–16)
BUN SERPL-MCNC: 13 MG/DL (ref 7–20)
CALCIUM SERPL-MCNC: 9.2 MG/DL (ref 8.3–10.6)
CHLORIDE SERPL-SCNC: 94 MMOL/L (ref 99–110)
CO2 SERPL-SCNC: 33 MMOL/L (ref 21–32)
CREAT SERPL-MCNC: 0.6 MG/DL (ref 0.8–1.3)
GFR SERPLBLD CREATININE-BSD FMLA CKD-EPI: >90 ML/MIN/{1.73_M2}
GLUCOSE BLD-MCNC: 105 MG/DL (ref 70–99)
GLUCOSE BLD-MCNC: 118 MG/DL (ref 70–99)
GLUCOSE BLD-MCNC: 126 MG/DL (ref 70–99)
GLUCOSE BLD-MCNC: 129 MG/DL (ref 70–99)
GLUCOSE BLD-MCNC: 150 MG/DL (ref 70–99)
GLUCOSE SERPL-MCNC: 123 MG/DL (ref 70–99)
PERFORMED ON: ABNORMAL
POTASSIUM SERPL-SCNC: 3.8 MMOL/L (ref 3.5–5.1)
SODIUM SERPL-SCNC: 135 MMOL/L (ref 136–145)

## 2025-03-18 PROCEDURE — 2580000003 HC RX 258: Performed by: INTERNAL MEDICINE

## 2025-03-18 PROCEDURE — 94660 CPAP INITIATION&MGMT: CPT

## 2025-03-18 PROCEDURE — 74230 X-RAY XM SWLNG FUNCJ C+: CPT

## 2025-03-18 PROCEDURE — 94640 AIRWAY INHALATION TREATMENT: CPT

## 2025-03-18 PROCEDURE — 6360000002 HC RX W HCPCS: Performed by: INTERNAL MEDICINE

## 2025-03-18 PROCEDURE — 6370000000 HC RX 637 (ALT 250 FOR IP): Performed by: STUDENT IN AN ORGANIZED HEALTH CARE EDUCATION/TRAINING PROGRAM

## 2025-03-18 PROCEDURE — 80048 BASIC METABOLIC PNL TOTAL CA: CPT

## 2025-03-18 PROCEDURE — 94761 N-INVAS EAR/PLS OXIMETRY MLT: CPT

## 2025-03-18 PROCEDURE — 6370000000 HC RX 637 (ALT 250 FOR IP): Performed by: INTERNAL MEDICINE

## 2025-03-18 PROCEDURE — 99233 SBSQ HOSP IP/OBS HIGH 50: CPT | Performed by: INTERNAL MEDICINE

## 2025-03-18 PROCEDURE — 99232 SBSQ HOSP IP/OBS MODERATE 35: CPT | Performed by: PSYCHIATRY & NEUROLOGY

## 2025-03-18 PROCEDURE — 94669 MECHANICAL CHEST WALL OSCILL: CPT

## 2025-03-18 PROCEDURE — 92611 MOTION FLUOROSCOPY/SWALLOW: CPT

## 2025-03-18 PROCEDURE — 6370000000 HC RX 637 (ALT 250 FOR IP): Performed by: NEUROMUSCULOSKELETAL MEDICINE & OMM

## 2025-03-18 PROCEDURE — 99232 SBSQ HOSP IP/OBS MODERATE 35: CPT | Performed by: STUDENT IN AN ORGANIZED HEALTH CARE EDUCATION/TRAINING PROGRAM

## 2025-03-18 PROCEDURE — 6360000002 HC RX W HCPCS: Performed by: PSYCHIATRY & NEUROLOGY

## 2025-03-18 PROCEDURE — 2700000000 HC OXYGEN THERAPY PER DAY

## 2025-03-18 PROCEDURE — 2000000000 HC ICU R&B

## 2025-03-18 PROCEDURE — 36415 COLL VENOUS BLD VENIPUNCTURE: CPT

## 2025-03-18 PROCEDURE — 2060000000 HC ICU INTERMEDIATE R&B

## 2025-03-18 PROCEDURE — 2500000003 HC RX 250 WO HCPCS: Performed by: INTERNAL MEDICINE

## 2025-03-18 RX ORDER — ZONISAMIDE 100 MG/1
100 CAPSULE ORAL
Status: DISCONTINUED | OUTPATIENT
Start: 2025-03-19 | End: 2025-03-25 | Stop reason: HOSPADM

## 2025-03-18 RX ADMIN — DILTIAZEM HYDROCHLORIDE 60 MG: 60 TABLET ORAL at 05:55

## 2025-03-18 RX ADMIN — DILTIAZEM HYDROCHLORIDE 60 MG: 60 TABLET ORAL at 16:33

## 2025-03-18 RX ADMIN — GABAPENTIN 200 MG: 250 SOLUTION ORAL at 07:59

## 2025-03-18 RX ADMIN — Medication 4 ML: at 20:12

## 2025-03-18 RX ADMIN — Medication 15 ML: at 07:58

## 2025-03-18 RX ADMIN — DULOXETINE HYDROCHLORIDE 30 MG: 30 CAPSULE, DELAYED RELEASE ORAL at 07:58

## 2025-03-18 RX ADMIN — RIFAXIMIN 550 MG: 550 TABLET ORAL at 21:45

## 2025-03-18 RX ADMIN — SODIUM CHLORIDE, PRESERVATIVE FREE 10 ML: 5 INJECTION INTRAVENOUS at 08:00

## 2025-03-18 RX ADMIN — SODIUM CHLORIDE, PRESERVATIVE FREE 10 ML: 5 INJECTION INTRAVENOUS at 21:46

## 2025-03-18 RX ADMIN — ZONISAMIDE 50 MG: 25 CAPSULE ORAL at 07:58

## 2025-03-18 RX ADMIN — Medication 5000 UNITS: at 07:59

## 2025-03-18 RX ADMIN — OXYCODONE 5 MG: 5 TABLET ORAL at 20:09

## 2025-03-18 RX ADMIN — GABAPENTIN 200 MG: 250 SOLUTION ORAL at 20:08

## 2025-03-18 RX ADMIN — ALBUTEROL SULFATE 2.5 MG: 2.5 SOLUTION RESPIRATORY (INHALATION) at 16:09

## 2025-03-18 RX ADMIN — ALLOPURINOL 300 MG: 300 TABLET ORAL at 07:58

## 2025-03-18 RX ADMIN — METOPROLOL TARTRATE 50 MG: 50 TABLET, FILM COATED ORAL at 07:58

## 2025-03-18 RX ADMIN — ALBUTEROL SULFATE 2.5 MG: 2.5 SOLUTION RESPIRATORY (INHALATION) at 20:12

## 2025-03-18 RX ADMIN — Medication 4 ML: at 08:11

## 2025-03-18 RX ADMIN — ALBUTEROL SULFATE 2.5 MG: 2.5 SOLUTION RESPIRATORY (INHALATION) at 08:11

## 2025-03-18 RX ADMIN — GABAPENTIN 200 MG: 250 SOLUTION ORAL at 16:33

## 2025-03-18 RX ADMIN — METOPROLOL TARTRATE 50 MG: 50 TABLET, FILM COATED ORAL at 21:45

## 2025-03-18 RX ADMIN — LACTULOSE 30 G: 10 SOLUTION ORAL at 07:58

## 2025-03-18 RX ADMIN — RIFAXIMIN 550 MG: 550 TABLET ORAL at 07:59

## 2025-03-18 RX ADMIN — QUETIAPINE FUMARATE 50 MG: 25 TABLET ORAL at 21:45

## 2025-03-18 RX ADMIN — QUETIAPINE FUMARATE 50 MG: 25 TABLET ORAL at 07:58

## 2025-03-18 RX ADMIN — ENOXAPARIN SODIUM 40 MG: 100 INJECTION SUBCUTANEOUS at 07:59

## 2025-03-18 RX ADMIN — ALBUTEROL SULFATE 2.5 MG: 2.5 SOLUTION RESPIRATORY (INHALATION) at 11:56

## 2025-03-18 RX ADMIN — DILTIAZEM HYDROCHLORIDE 60 MG: 60 TABLET ORAL at 21:45

## 2025-03-18 RX ADMIN — PANTOPRAZOLE SODIUM 40 MG: 40 INJECTION, POWDER, LYOPHILIZED, FOR SOLUTION INTRAVENOUS at 07:59

## 2025-03-18 RX ADMIN — ATORVASTATIN CALCIUM 40 MG: 40 TABLET, FILM COATED ORAL at 07:58

## 2025-03-18 RX ADMIN — GABAPENTIN 200 MG: 250 SOLUTION ORAL at 03:15

## 2025-03-18 RX ADMIN — THIAMINE HYDROCHLORIDE 100 MG: 100 INJECTION, SOLUTION INTRAMUSCULAR; INTRAVENOUS at 07:59

## 2025-03-18 ASSESSMENT — PAIN SCALES - GENERAL
PAINLEVEL_OUTOF10: 0
PAINLEVEL_OUTOF10: 4
PAINLEVEL_OUTOF10: 6
PAINLEVEL_OUTOF10: 0
PAINLEVEL_OUTOF10: 3
PAINLEVEL_OUTOF10: 2

## 2025-03-18 ASSESSMENT — PAIN DESCRIPTION - ORIENTATION
ORIENTATION: RIGHT
ORIENTATION: RIGHT;LEFT;LOWER

## 2025-03-18 ASSESSMENT — PAIN DESCRIPTION - DESCRIPTORS
DESCRIPTORS: ACHING
DESCRIPTORS: ACHING

## 2025-03-18 ASSESSMENT — PAIN SCALES - WONG BAKER
WONGBAKER_NUMERICALRESPONSE: NO HURT
WONGBAKER_NUMERICALRESPONSE: HURTS A LITTLE BIT

## 2025-03-18 ASSESSMENT — PAIN DESCRIPTION - LOCATION
LOCATION: ABDOMEN
LOCATION: LEG;TOE (COMMENT WHICH ONE)

## 2025-03-18 ASSESSMENT — PAIN - FUNCTIONAL ASSESSMENT: PAIN_FUNCTIONAL_ASSESSMENT: ACTIVITIES ARE NOT PREVENTED

## 2025-03-18 NOTE — PROCEDURES
PAST MEDICAL HISTORY        Diagnosis Date    Bladder outlet obstruction 03/05/2017    Carpal tunnel syndrome of left wrist 04/02/2013    Cellulitis of right lower extremity 03/27/2023    Cellulitis of right upper extremity 03/01/2023    w/ Group A Strep bacteremia    COPD exacerbation (HCC) 03/02/2023    Cough syncope 01/10/2023    Diabetic ketoacidosis without coma associated with type 2 diabetes mellitus (HCC) 02/18/2018    GI bleed 05/08/2022    Gout 02/01/2013    Hilar adenopathy     Iron deficiency anemia     Malignant neoplasm of urinary bladder (HCC) 02/09/2016    Multiple duodenal ulcers     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 swine flu vaccination    Severe claudication 01/24/2020    Ulnar nerve entrapment 02/09/2016    Uncontrolled hypertension 11/12/2019       PAST SURGICAL HISTORY    Past Surgical History:   Procedure Laterality Date    ANKLE FRACTURE SURGERY Left 2/12/2024    OPEN REDUCTION INTERNAL FIXATION OF LEFT ANKLE FRACTURE DISLOCATION WITH SYNDESMOSIS REPAIR performed by Sudeep Ruth MD at [facility] OR    AORTO-ILIAC BYPASS GRAFT N/A 08/24/2020    AORTOBIFEMORAL BYPASS GRAFT performed by Sam Fair MD at [facility] OR    BRONCHOSCOPY  02/07/2011    bronch with EBUS    CATARACT REMOVAL Left     COLONOSCOPY N/A 07/23/2018    COLONOSCOPY WITH BIOPSY performed by Maicol Fournier MD at [facility] ENDOSCOPY    COLONOSCOPY N/A 07/23/2018    COLONOSCOPY POLYPECTOMY SNARE/COLD BIOPSY performed by Maicol Fournier MD at [facility] ENDOSCOPY    COLONOSCOPY N/A 05/10/2022    COLON W/ANES. performed by Diana Gifford MD at [facility] ENDOSCOPY    CYSTOSCOPY  02/04/2015    Urethral Dilatation, Resection of Bladder Tumor    CYSTOSCOPY  05/06/2014    w/ bladder biopsy    CYSTOSCOPY  09/09/2015    w/ urethral dilatation, bladder tumor follow up    CYSTOSCOPY  02/24/2016     tolerate instrumental assessment when able  03/18/2025 GOAL MET  Goal 2: The patient will tolerate therapeutic diet upgrade trials with no clinical s/s of aspiration 5/5 3/18/2025 : Goal addressed during MBS, see above. Ongoing, progressing.  Goal 3: The patient/caregiver will demonstrate understanding of compensatory swallow strategies, for improved swallow safety 3/18/2025 : Goal addressed, see above. Ongoing, not progressing.  New Goal 4: The patient will complete trial therapy with effortful swallow x5 reps for 3 sets to target hyolarygeal excursion, BOT retraction, and pharyngeal constriction as foundation for intensive dysphagia tx program.    Long Term Goals  Timeframe for Long term goals 7 days (03/25/2025)  Goal 1: The patient will tolerate least restrictive diet with no clinical s/s of aspiration or worsening respiratory/pulmonary status 3/18/2025 : Goal addressed, see above. Ongoing, progressing.       Pain: The patient does not complain of pain    Patient seen for inpatient MBS. Referring provider will be notified of results and recommendations. Please don't hesitate to contact me at [phone] with questions or concerns.      Therapy Time:    Individual   Time In  0912   Time Out  0927   Minutes  15 min/1 unit      Signature:  Tayla Shelby M.A. SLP  Speech-Language Pathologist  [place] Lic #[id number]  [phone]

## 2025-03-18 NOTE — PROGRESS NOTES
AM assessment complete, see flowsheet. Medications given per MAR. Pt alert to self only, but intermittently oriented to situation and time. Pt is compliant with staff, but extremely impulsive. VSS, IV lines and monitors checked and tightened. Plans for modified barium swallow this morning. No other needs noted, awaiting MD rounds.

## 2025-03-18 NOTE — PROGRESS NOTES
Pt able to tolerated transferring up to chair via trevin bautista. Continues to be compliant with staff. VSS. Will continue to monitor.

## 2025-03-18 NOTE — PROGRESS NOTES
Daily  acetaminophen (TYLENOL) tablet 650 mg, 650 mg, Oral, Q6H PRN **OR** acetaminophen (TYLENOL) suppository 650 mg, 650 mg, Rectal, Q6H PRN  dextrose bolus 10% 125 mL, 125 mL, IntraVENous, PRN **OR** dextrose bolus 10% 250 mL, 250 mL, IntraVENous, PRN  glucagon injection 1 mg, 1 mg, SubCUTAneous, PRN  dextrose 10 % infusion, , IntraVENous, Continuous PRN  [DISCONTINUED] potassium chloride 20 mEq/50 mL IVPB (Central Line), 20 mEq, IntraVENous, PRN **OR** potassium chloride 10 mEq/100 mL IVPB (Peripheral Line), 10 mEq, IntraVENous, PRN  magnesium sulfate 2000 mg in 50 mL IVPB premix, 2,000 mg, IntraVENous, PRN  allopurinol (ZYLOPRIM) tablet 300 mg, 300 mg, Oral, Daily  vitamin D3 (CHOLECALCIFEROL) tablet 5,000 Units, 5,000 Units, Oral, Daily  atorvastatin (LIPITOR) tablet 40 mg, 40 mg, Oral, Daily  glucose chewable tablet 16 g, 4 tablet, Oral, PRN    ASSESSMENT AND PLAN    Principal Problem:    Respiratory failure with hypoxia (HCC)  Active Problems:    Seizure-like activity (HCC)    Tobacco abuse    Severe protein-calorie malnutrition    Acute on chronic respiratory failure with hypoxia and hypercapnia (HCC)    Diabetes mellitus (HCC)    Acute respiratory failure (HCC)    Alcohol use disorder    Hypomagnesemia    SBO (small bowel obstruction) (HCC)    Toxic metabolic encephalopathy    Hypernatremia    Disorder of electrolytes    Ileus (HCC)    Agitation    Chest pain    Alcohol withdrawal syndrome, with delirium (HCC)    Pulmonary congestion    Acute kidney injury    Tracheobronchitis    Enteritis    Acute metabolic encephalopathy    Acute encephalopathy    Delirium due to another medical condition, persistent, hyperactive  Resolved Problems:    Influenza A       1.Patient s symptoms   are improving  2.Probable discharge is next week  3.Discharge planning is incomplete  4. Suicidal ideation is  none  5. Total time with patient was 35 minutes and more than 50 % of that time was spent counseling the patient on their

## 2025-03-18 NOTE — CARE COORDINATION
Reviewed chart, pt off floor for barium swallow eval. Spoke with Dilan MEEKS CM and Bubba with [employer]. We are waiting on family to make decision for PEG. Plan is for [facility]. Spoke with Shamika at [facility] who states they are in network but have not accepted pt as of yet. He will need the following before they can officially accept:    Will need PEG   No restraints and sitter free for 24-48 hrs  Off precedex - not currently on this medication  He has a costly liver medication that is $117 per day, they will need to get a cost estimate from [facility] once he is more stable.  They will likely need to come under medicaid as they could only skill pt for a short period of time.      The above info was emailed to Bubba to updated her as she may be planning a family meeting. ARIEL following.

## 2025-03-18 NOTE — PROGRESS NOTES
03/18/25 0338   NIV Type   Equipment Type v60   Mode Bilevel   Mask Type Full face mask   Mask Size Extra large   Assessment   Pulse (!) 102   Respirations 15   SpO2 98 %   Comfort Level Good   Using Accessory Muscles No   Mask Compliance Good   Skin Assessment Clean, dry, & intact   Skin Protection for O2 Device Yes   Orientation Middle   Location Nose   Intervention(s) Skin Barrier   Breath Sounds   Respiratory Pattern Regular   Right Upper Lobe Diminished   Right Middle Lobe Diminished   Right Lower Lobe Diminished   Left Upper Lobe Diminished   Left Lower Lobe Diminished   Settings/Measurements   PIP Observed 16 cm H20   IPAP 15 cmH20   CPAP/EPAP 55 cmH2O   Vt (Measured) 727 mL   Rate Ordered 18   Insp Rise Time (%) 1 %   FiO2  40 %   I Time/ I Time % 0.9 s   Minute Volume (L/min) 13.8 Liters   Mask Leak (lpm) 55 lpm   Patient's Home Machine No   Alarm Settings   Alarms On Y   Low Pressure (cmH2O) 5 cmH2O   High Pressure (cmH2O) 40 cmH2O   Apnea (secs) 20 secs   RR Low (bpm) 12   RR High (bpm) 40 br/min

## 2025-03-18 NOTE — PROGRESS NOTES
Pt asking to get up to chair. Pt assisted up to VALERY bautista and then to reclining chair . Pt tolerated fairly well.

## 2025-03-18 NOTE — PROGRESS NOTES
03/17/25 2349   NIV Type   NIV Started/Stopped On   Equipment Type v60   Mode Bilevel   Mask Type Full face mask   Mask Size Extra large   Assessment   Pulse 87   Respirations 13   SpO2 97 %   Comfort Level Good   Using Accessory Muscles No   Mask Compliance Good   Skin Assessment Clean, dry, & intact   Skin Protection for O2 Device Yes   Orientation Middle   Location Nose   Intervention(s) Skin Barrier   Breath Sounds   Respiratory Pattern Regular   Right Upper Lobe Diminished   Right Middle Lobe Diminished   Right Lower Lobe Diminished   Left Upper Lobe Diminished   Left Lower Lobe Diminished   Settings/Measurements   PIP Observed 17 cm H20   IPAP 15 cmH20   CPAP/EPAP 5 cmH2O   Vt (Measured) 655 mL   Rate Ordered 18   Insp Rise Time (%) 1 %   FiO2  40 %   I Time/ I Time % 0.9 s   Minute Volume (L/min) 14.2 Liters   Mask Leak (lpm) 49 lpm   Patient's Home Machine No   Alarm Settings   Alarms On Y   Low Pressure (cmH2O) 5 cmH2O   High Pressure (cmH2O) 40 cmH2O   Apnea (secs) 20 secs   RR Low (bpm) 12   RR High (bpm) 40 br/min

## 2025-03-18 NOTE — PROGRESS NOTES
0725H - Seen patient on chair sitting, awake alert, oriented to name, birthday, place but intermittently confused. Can follow simple commands. Has find tremors on his arms/hands.   0728H - Transferred patient back to bed as requested. Patient is weak and unable to sustain standing. Side rails up x2. Sitter on bedside. Positioned bed to lowest. Call light given.   On 3L nasal cannula O2 at 96%. Crackle lung sounds noted. /84 MAP 94.  BPM - Sinus tachycardia. Has 1 PIV on the left basiclic vein, no blood return, flushed and check connections. Weak muscles strength. Can turn to side. Elevated legs with pillow, provided TV for entertainment.

## 2025-03-18 NOTE — PROGRESS NOTES
[facility]                            Critical care Progress Note :    CC: follow on RF post vent     Events of Last 24 hours:    BiPAP 15/5 50% -4  Down to 4 L   Secretions better   Still cannot swallow   Some agitation at night   Speech will see again ,had modified     Vascular lines: PIV only           IV:   sodium chloride      sodium chloride      dextrose         Vitals:  Blood pressure 120/65, pulse 95, temperature 98.8 °F (37.1 °C), temperature source Bladder, resp. rate 16, height 1.702 m (5' 7.01\"), weight 78.1 kg (172 lb 2.9 oz), SpO2 94%.  on 6 L    EXAM:  General: ill appearing.    Eyes: No sclera icterus. No conjunctival injection.  ENT: No discharge.   Neck: Trachea midline.   Resp: No accessory muscle use. Few crackles. No wheezing. Few rhonchi.   CV: Regular  rate. Regular rhythm. No mumur or rub. No edema.   GI: Non-tender. Non-distended.   Skin: Warm and dry. No rash on exposed extremities.  M/S: No cyanosis. No clubbing.   Neuro: + following commands.  Good cough.   Psych: lethargic today        Scheduled Meds:   thiamine  100 mg IntraVENous Daily    lactulose  30 g Oral Daily    sodium chloride (Inhalant)  4 mL Nebulization BID RT    QUEtiapine  50 mg Oral BID    rifAXIMin  550 mg Oral BID    zonisamide  50 mg Oral BID    Gabapentin  200 mg Per NG tube Q6H    DULoxetine  30 mg Oral Daily    cloNIDine  1 patch TransDERmal Weekly    CENTRUM/CERTA-KAYKAY with minerals oral  15 mL Oral Daily    dilTIAZem  60 mg Oral 3 times per day    metoprolol tartrate  50 mg Oral BID    polyethylene glycol  17 g Oral Daily    sodium chloride flush  5-40 mL IntraVENous 2 times per day    insulin lispro  0-16 Units SubCUTAneous Q4H    albuterol  2.5 mg Nebulization 4x Daily RT    pantoprazole (PROTONIX) 40 mg in sodium chloride (PF) 0.9 % 10 mL injection  40 mg IntraVENous Daily    enoxaparin  40 mg SubCUTAneous Daily    allopurinol  300 mg Oral Daily    vitamin D3

## 2025-03-18 NOTE — PROGRESS NOTES
RT Inhaler-Nebulizer Bronchodilator Protocol Note    There is a bronchodilator order in the chart from a provider indicating to follow the RT Bronchodilator Protocol and there is an “Initiate RT Inhaler-Nebulizer Bronchodilator Protocol” order as well (see protocol at bottom of note).    CXR Findings:  No results found.    The findings from the last RT Protocol Assessment were as follows:   History Pulmonary Disease: (P) Chronic pulmonary disease  Respiratory Pattern: (P) Dyspnea on exertion or RR 21-25 bpm  Breath Sounds: (P) Intermittent or unilateral wheezes  Cough: (P) Weak, productive  Indication for Bronchodilator Therapy: (P) Wheezing associated with pulm disorder  Bronchodilator Assessment Score: (P) 10    Aerosolized bronchodilator medication orders have been revised according to the RT Inhaler-Nebulizer Bronchodilator Protocol below.    Respiratory Therapist to perform RT Therapy Protocol Assessment initially then follow the protocol.  Repeat RT Therapy Protocol Assessment PRN for score 0-3 or on second treatment, BID, and PRN for scores above 3.    No Indications - adjust the frequency to every 6 hours PRN wheezing or bronchospasm, if no treatments needed after 48 hours then discontinue using Per Protocol order mode.     If indication present, adjust the RT bronchodilator orders based on the Bronchodilator Assessment Score as indicated below.  Use Inhaler orders unless patient has one or more of the following: on home nebulizer, not able to hold breath for 10 seconds, is not alert and oriented, cannot activate and use MDI correctly, or respiratory rate 25 breaths per minute or more, then use the equivalent nebulizer order(s) with same Frequency and PRN reasons based on the score.  If a patient is on this medication at home then do not decrease Frequency below that used at home.    0-3 - enter or revise RT bronchodilator order(s) to equivalent RT Bronchodilator order with Frequency of every 4 hours PRN for

## 2025-03-18 NOTE — PROGRESS NOTES
Pt was on BiPap until 4 am 5 am pt up to chair with STEDY. Pt taking a few ice chips without coughing.

## 2025-03-18 NOTE — CONSULTS
Clinical Ethics Consultation Note    History and Context:  Pt is a 69 y.o. male with a pmh of COPD, chronic respiratory failure, gastroesophageal reflux disease, hyperlipidemia, alcohol liver cirrhosis, who presented with Respiratory failure with hypoxia. He was on ventilator and now extubated. He has inconsistent swallowing, per nursing, largely because of poor mentation. He needs nutrition assistance and is currently using an NG tube.     Pt lacks decision making capacity. Pt's daughter Laurence is primary contact for the hospital as family liaison. She has been working with other family members to get their input. Family is making decisions collectively, as [facility] hierarchy places surrogate decision making collectively with the children. Pt has been estranged from his biological children, but he has reconnected with them in recent years. Laurence said they have had a consistent relationship for 6 years now.  Length of Stay: 47  Verified Advance Directive: No    Process:  Chart review, interview with case management and nursing, attending IDT rounds, interview with family representative, Catia. Attempted conversation with another family member, Abran.     Ethical Question(s) and Concern(s):  What are the patient's goals of care, and is a PEG aligned with that goal?    Analysis:  When I spoke with Catia this morning, she said she does not know what patient's goals would be or what burdens he would consider too great to bear. She does think other family members have a sense of that, and they have discussed this care assuming the goals of longevity. They have all been in agreement about this. She said they have begun to consider that \"he is not the man he was a month ago\" and that this may be a permanent change. I encouraged her to discuss with other family members what a meaningful final of stage of life would involve--clinically and socially--so that they could attempt a substituted judgment.    Whether the

## 2025-03-18 NOTE — PROGRESS NOTES
Progress Note    Admit Date:  1/30/2025    Interval history    Admitted for flu, copd exacerbation, course complicated with development of sbo, resp failure needing vent support     Tolerated pressure support 5/5 for 6 hours yesterday.  Plans for spontaneous breathing trial and possible extubation today.  On Precedex.    3/5-extubated on 3/4/2025.  Placed on BiPAP.  Has been intermittently confused.  Was on Precedex.  Has had some intermittent agitation.  When I saw the patient his eyes are open.   at bedside.  He has a good cough.    3/6- more calm then yesterday. On Bipap. Failed speech eval but they will see him again. Responding nicely to verbal commands,    3/7- more calm having thick secretions. ST has recommended MBS. He is NPO. Used Bipap last night. BP elevated today.    3/9- MR singer seen awake, but confused, restless was given seroquel for agitation. Confused and not directable only for few minutes and gets restless, throwing stuff  Was on precedex overnight but now off    3/10  Continues to be disoriented   Recd haldol yesterday  Now in restraints  Used BiPAP at night   Now on 5 L     3/11  Disoriented   Off restraints now   biPAP for 4-5 hours last night   On 5 L now     3/12   Continues to be confused   On BiPAP at night   5 L now     3/13  On BiPAP  Restless,disoriented     3/14   On BIPAP all night  Now on 4 L   Confused     3/15   On Bipap at night  Now on 4 L     3/16   Bipap at night   Now on 4 L     3/17- intermittently agitated. Sitter at bedside. A little more calm this am and answering some questions when I talked to him.     3/18-more calm this morning but may be just waking up from his sleep.  Speech saw the patient did not regain swallow.  Recommended meds to be crushed and given.  Family considering alternative modes of feeding-PEG tube.  GI consulted.  Psychiatry has seen patient.     Objective:   Patient Vitals for the past 4 hrs:   BP Temp Temp src Pulse Resp SpO2  regurgitation. No stenosis noted.    Tricuspid Valve: Not well visualized. Trace regurgitation. Unable to assess RVSP due to inadequate or insignificant tricuspid regurgitation.    Right Atrium: Right atrium is mildly dilated.    Image quality is technically difficult. Limited study due to patient's condition, patient's ability to tolerate test and procedure performed with the patient in a supine position.    Sputum- MRSA     Assessment/Plan:    Acute on chronic hypoxic and hypercarbic resp failure   Sec to Influenza A and COPD exacerbation  -Placed on BiPAP and admitted to the ICU  -Used BiPAP on admission and improved some initially  -Pred taper -and tamiflu completed     Resp failure worsened with development of SBO and required vent support   -Over the course of his hospitalization he has received vancomycin, Zosyn, Diflucan, Z-Zev and Tamiflu.   sputum with MRSA    - repeat steroids , HHn given- improved slowly and now off vent   -pulmonary managing   -He was extubated on 3/4/2025.  -On prn Haldol. Using Bipap as needed. -Mentation some improved.  Modified barium swallow done.  NG tube removed for now.  NG removed so that he would have more success with modified barium swallow.  biPAP at night   Now on 4 L   -Consider PEG tube if he continues to have trouble with swallowing.  -Psychiatry consulted given continued agitation.  Psychiatrist note reviewed.  More calm today.    Enteritis with partial SBO resolved  -Ct with enteritis and also has several metabolic issues like above  -NG suction, NPO. Bowel rest given with no change  -General surgery consulted   -Enema and suppository with no benefit,  -given Zosyn, diflucan, vanc ,now off abx   -started TPN support with no return of bowel function.   -Now SBO slowly resolved.   -Monitored electrolytes and replaced as needed  - removed NG tube. May need PEG. Did not do well with MBS    Acute metabolic encephalopathy   Developed confusion initially with alcohol

## 2025-03-18 NOTE — PROGRESS NOTES
Pt medicated with Oxycodone 5 mg 1 pill for C/O  R shoulder, R ear and R knee pain. Pt turned and repositioned in bed.

## 2025-03-18 NOTE — PLAN OF CARE
Problem: Chronic Conditions and Co-morbidities  Goal: Patient's chronic conditions and co-morbidity symptoms are monitored and maintained or improved  Recent Flowsheet Documentation  Taken 3/17/2025 2000 by Viki Tran RN  Care Plan - Patient's Chronic Conditions and Co-Morbidity Symptoms are Monitored and Maintained or Improved:   Monitor and assess patient's chronic conditions and comorbid symptoms for stability, deterioration, or improvement   Collaborate with multidisciplinary team to address chronic and comorbid conditions and prevent exacerbation or deterioration   Update acute care plan with appropriate goals if chronic or comorbid symptoms are exacerbated and prevent overall improvement and discharge     Problem: Safety - Adult  Goal: Free from fall injury  Outcome: Progressing     Problem: Skin/Tissue Integrity - Adult  Goal: Skin integrity remains intact  Description: 1.  Monitor for areas of redness and/or skin breakdown  2.  Assess vascular access sites hourly  3.  Every 4-6 hours minimum:  Change oxygen saturation probe site  4.  Every 4-6 hours:  If on nasal continuous positive airway pressure, respiratory therapy assess nares and determine need for appliance change or resting period  Outcome: Progressing  Flowsheets  Taken 3/18/2025 0200  Skin Integrity Remains Intact:   Monitor for areas of redness and/or skin breakdown   Assess vascular access sites hourly   Every 4-6 hours minimum: Change oxygen saturation probe site   Every 4-6 hours: If on nasal continuous positive airway pressure, respiratory therapy assesses nares and determine need for appliance change or resting period  Taken 3/17/2025 2000  Skin Integrity Remains Intact:   Monitor for areas of redness and/or skin breakdown   Assess vascular access sites hourly   Every 4-6 hours minimum: Change oxygen saturation probe site   Every 4-6 hours: If on nasal continuous positive airway pressure, respiratory therapy assesses nares and determine

## 2025-03-18 NOTE — PROGRESS NOTES
Neurology Follow-up  [facility]  Janes Sainz MD    Date of Service: 03/18/25        Sandor Early is a 69 y.o. 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    Subjective:   CC: Follow up today regarding: Seizure-like activity (bilateral shaking with retained awareness) and encephalopathy in the setting of alcoholic cirrhosis, alcohol withdrawal, acute on chronic respiratory failure, enteritis with partial small bowel obstruction     Events noted. Chart and lab reviewed.     Zonisamide 50 mg twice daily was started on 3/10.  Last dose of Keppra was 3/15.  He has continued the same dose of gabapentin 200 mg twice daily.  He has continued to have issues with agitation intermittently.  He has been on BiPAP at night and nasal cannula oxygen during the daytime.  Psychiatry is following and recommended using Seroquel 50 mg twice daily scheduled and escalate to olanzapine 5 mg twice daily if agitation worsens.     Modified barium swallow test performed today revealed silent tracheal aspiration with thin liquids especially when swallowing from a straw, mild to moderate laryngeal penetration without tracheal aspiration of nectar thickened liquids, and is no evidence of laryngeal penetration or tracheal aspiration of honey thickened liquids or purées.    Clinical ethics consultation service is involved addressing decision-making capacity and

## 2025-03-18 NOTE — PROGRESS NOTES
Occupational Therapy/Physical Therapy  Attempted to see patient this date. Patient off floor for testing first attempt, second attempt pt refused due to fatigue after testing and wanting to sleep despite therapists and rounding MD attempting to encourage OOB activity. Will continue to follow as patient status allows.    Ele Brennan, OTR/JUAN MANUEL Frank, PT

## 2025-03-19 ENCOUNTER — APPOINTMENT (OUTPATIENT)
Dept: ULTRASOUND IMAGING | Age: 70
DRG: 207 | End: 2025-03-19
Payer: MEDICARE

## 2025-03-19 LAB
ANION GAP SERPL CALCULATED.3IONS-SCNC: 8 MMOL/L (ref 3–16)
BASOPHILS # BLD: 0 K/UL (ref 0–0.2)
BASOPHILS NFR BLD: 0.5 %
BUN SERPL-MCNC: 12 MG/DL (ref 7–20)
CALCIUM SERPL-MCNC: 10 MG/DL (ref 8.3–10.6)
CHLORIDE SERPL-SCNC: 93 MMOL/L (ref 99–110)
CO2 SERPL-SCNC: 33 MMOL/L (ref 21–32)
CREAT SERPL-MCNC: 0.6 MG/DL (ref 0.8–1.3)
DEPRECATED RDW RBC AUTO: 18 % (ref 12.4–15.4)
EOSINOPHIL # BLD: 0.4 K/UL (ref 0–0.6)
EOSINOPHIL NFR BLD: 5.1 %
GFR SERPLBLD CREATININE-BSD FMLA CKD-EPI: >90 ML/MIN/{1.73_M2}
GLUCOSE BLD-MCNC: 111 MG/DL (ref 70–99)
GLUCOSE BLD-MCNC: 112 MG/DL (ref 70–99)
GLUCOSE BLD-MCNC: 116 MG/DL (ref 70–99)
GLUCOSE BLD-MCNC: 118 MG/DL (ref 70–99)
GLUCOSE BLD-MCNC: 118 MG/DL (ref 70–99)
GLUCOSE SERPL-MCNC: 118 MG/DL (ref 70–99)
HCT VFR BLD AUTO: 30.4 % (ref 40.5–52.5)
HGB BLD-MCNC: 9.8 G/DL (ref 13.5–17.5)
INR PPP: 1.03 (ref 0.85–1.15)
LYMPHOCYTES # BLD: 2.1 K/UL (ref 1–5.1)
LYMPHOCYTES NFR BLD: 24.6 %
MCH RBC QN AUTO: 28.3 PG (ref 26–34)
MCHC RBC AUTO-ENTMCNC: 32.4 G/DL (ref 31–36)
MCV RBC AUTO: 87.4 FL (ref 80–100)
MONOCYTES # BLD: 0.8 K/UL (ref 0–1.3)
MONOCYTES NFR BLD: 9.6 %
NEUTROPHILS # BLD: 5.1 K/UL (ref 1.7–7.7)
NEUTROPHILS NFR BLD: 60.2 %
PERFORMED ON: ABNORMAL
PLATELET # BLD AUTO: 332 K/UL (ref 135–450)
PMV BLD AUTO: 7.8 FL (ref 5–10.5)
POTASSIUM SERPL-SCNC: 3.6 MMOL/L (ref 3.5–5.1)
PROTHROMBIN TIME: 13.7 SEC (ref 11.9–14.9)
RBC # BLD AUTO: 3.48 M/UL (ref 4.2–5.9)
SODIUM SERPL-SCNC: 134 MMOL/L (ref 136–145)
WBC # BLD AUTO: 8.4 K/UL (ref 4–11)

## 2025-03-19 PROCEDURE — 6360000002 HC RX W HCPCS: Performed by: INTERNAL MEDICINE

## 2025-03-19 PROCEDURE — 85610 PROTHROMBIN TIME: CPT

## 2025-03-19 PROCEDURE — 97110 THERAPEUTIC EXERCISES: CPT

## 2025-03-19 PROCEDURE — 99232 SBSQ HOSP IP/OBS MODERATE 35: CPT | Performed by: INTERNAL MEDICINE

## 2025-03-19 PROCEDURE — 2060000000 HC ICU INTERMEDIATE R&B

## 2025-03-19 PROCEDURE — 51798 US URINE CAPACITY MEASURE: CPT

## 2025-03-19 PROCEDURE — 94660 CPAP INITIATION&MGMT: CPT

## 2025-03-19 PROCEDURE — 6370000000 HC RX 637 (ALT 250 FOR IP): Performed by: INTERNAL MEDICINE

## 2025-03-19 PROCEDURE — 94640 AIRWAY INHALATION TREATMENT: CPT

## 2025-03-19 PROCEDURE — 6370000000 HC RX 637 (ALT 250 FOR IP): Performed by: STUDENT IN AN ORGANIZED HEALTH CARE EDUCATION/TRAINING PROGRAM

## 2025-03-19 PROCEDURE — 80048 BASIC METABOLIC PNL TOTAL CA: CPT

## 2025-03-19 PROCEDURE — 2500000003 HC RX 250 WO HCPCS: Performed by: INTERNAL MEDICINE

## 2025-03-19 PROCEDURE — 94761 N-INVAS EAR/PLS OXIMETRY MLT: CPT

## 2025-03-19 PROCEDURE — 97112 NEUROMUSCULAR REEDUCATION: CPT

## 2025-03-19 PROCEDURE — 2700000000 HC OXYGEN THERAPY PER DAY

## 2025-03-19 PROCEDURE — 6370000000 HC RX 637 (ALT 250 FOR IP): Performed by: NEUROMUSCULOSKELETAL MEDICINE & OMM

## 2025-03-19 PROCEDURE — 36415 COLL VENOUS BLD VENIPUNCTURE: CPT

## 2025-03-19 PROCEDURE — 97530 THERAPEUTIC ACTIVITIES: CPT

## 2025-03-19 PROCEDURE — 99233 SBSQ HOSP IP/OBS HIGH 50: CPT | Performed by: INTERNAL MEDICINE

## 2025-03-19 PROCEDURE — 6360000002 HC RX W HCPCS: Performed by: PSYCHIATRY & NEUROLOGY

## 2025-03-19 PROCEDURE — 51701 INSERT BLADDER CATHETER: CPT

## 2025-03-19 PROCEDURE — 76705 ECHO EXAM OF ABDOMEN: CPT

## 2025-03-19 PROCEDURE — 85025 COMPLETE CBC W/AUTO DIFF WBC: CPT

## 2025-03-19 PROCEDURE — 2580000003 HC RX 258: Performed by: INTERNAL MEDICINE

## 2025-03-19 RX ORDER — QUETIAPINE FUMARATE 25 MG/1
50 TABLET, FILM COATED ORAL
Status: DISCONTINUED | OUTPATIENT
Start: 2025-03-20 | End: 2025-03-25 | Stop reason: HOSPADM

## 2025-03-19 RX ADMIN — ZONISAMIDE 100 MG: 25 CAPSULE ORAL at 20:29

## 2025-03-19 RX ADMIN — ALLOPURINOL 300 MG: 300 TABLET ORAL at 07:47

## 2025-03-19 RX ADMIN — SODIUM CHLORIDE, PRESERVATIVE FREE 10 ML: 5 INJECTION INTRAVENOUS at 07:57

## 2025-03-19 RX ADMIN — DULOXETINE HYDROCHLORIDE 30 MG: 30 CAPSULE, DELAYED RELEASE ORAL at 07:47

## 2025-03-19 RX ADMIN — ENOXAPARIN SODIUM 40 MG: 100 INJECTION SUBCUTANEOUS at 08:00

## 2025-03-19 RX ADMIN — OXYCODONE 5 MG: 5 TABLET ORAL at 07:59

## 2025-03-19 RX ADMIN — ALBUTEROL SULFATE 2.5 MG: 2.5 SOLUTION RESPIRATORY (INHALATION) at 19:40

## 2025-03-19 RX ADMIN — RIFAXIMIN 550 MG: 550 TABLET ORAL at 07:47

## 2025-03-19 RX ADMIN — METOPROLOL TARTRATE 50 MG: 50 TABLET, FILM COATED ORAL at 20:30

## 2025-03-19 RX ADMIN — RIFAXIMIN 550 MG: 550 TABLET ORAL at 20:30

## 2025-03-19 RX ADMIN — Medication 5000 UNITS: at 07:47

## 2025-03-19 RX ADMIN — Medication 4 ML: at 19:40

## 2025-03-19 RX ADMIN — GABAPENTIN 200 MG: 250 SOLUTION ORAL at 08:01

## 2025-03-19 RX ADMIN — ALBUTEROL SULFATE 2.5 MG: 2.5 SOLUTION RESPIRATORY (INHALATION) at 08:44

## 2025-03-19 RX ADMIN — ALBUTEROL SULFATE 2.5 MG: 2.5 SOLUTION RESPIRATORY (INHALATION) at 11:08

## 2025-03-19 RX ADMIN — GABAPENTIN 200 MG: 250 SOLUTION ORAL at 03:04

## 2025-03-19 RX ADMIN — DILTIAZEM HYDROCHLORIDE 60 MG: 60 TABLET ORAL at 20:29

## 2025-03-19 RX ADMIN — GABAPENTIN 200 MG: 250 SOLUTION ORAL at 20:31

## 2025-03-19 RX ADMIN — ATORVASTATIN CALCIUM 40 MG: 40 TABLET, FILM COATED ORAL at 07:47

## 2025-03-19 RX ADMIN — METOPROLOL TARTRATE 50 MG: 50 TABLET, FILM COATED ORAL at 07:47

## 2025-03-19 RX ADMIN — GABAPENTIN 200 MG: 250 SOLUTION ORAL at 15:35

## 2025-03-19 RX ADMIN — QUETIAPINE FUMARATE 50 MG: 25 TABLET ORAL at 07:47

## 2025-03-19 RX ADMIN — DILTIAZEM HYDROCHLORIDE 60 MG: 60 TABLET ORAL at 05:57

## 2025-03-19 RX ADMIN — DILTIAZEM HYDROCHLORIDE 60 MG: 60 TABLET ORAL at 15:35

## 2025-03-19 RX ADMIN — ALBUTEROL SULFATE 2.5 MG: 2.5 SOLUTION RESPIRATORY (INHALATION) at 15:51

## 2025-03-19 RX ADMIN — PANTOPRAZOLE SODIUM 40 MG: 40 INJECTION, POWDER, LYOPHILIZED, FOR SOLUTION INTRAVENOUS at 08:17

## 2025-03-19 RX ADMIN — Medication 4 ML: at 08:43

## 2025-03-19 RX ADMIN — SODIUM CHLORIDE, PRESERVATIVE FREE 10 ML: 5 INJECTION INTRAVENOUS at 20:32

## 2025-03-19 RX ADMIN — Medication 15 ML: at 07:59

## 2025-03-19 RX ADMIN — THIAMINE HYDROCHLORIDE 100 MG: 100 INJECTION, SOLUTION INTRAMUSCULAR; INTRAVENOUS at 07:57

## 2025-03-19 ASSESSMENT — PAIN SCALES - GENERAL
PAINLEVEL_OUTOF10: 0
PAINLEVEL_OUTOF10: 7
PAINLEVEL_OUTOF10: 2

## 2025-03-19 ASSESSMENT — PAIN DESCRIPTION - LOCATION: LOCATION: BACK

## 2025-03-19 NOTE — PROGRESS NOTES
Pt unable to urinate during shift. Bladder scanned and holding 640 ml. MD notified as this is the second time needing straight cathed and patient has had 2 foleys since admission. MD orders to consult urology and straight cath. Pt tolerated well. Post bladder scan holding 75 ml.

## 2025-03-19 NOTE — PROGRESS NOTES
Progress Note    Admit Date:  1/30/2025    Interval history    Admitted for flu, copd exacerbation, course complicated with development of sbo, resp failure needing vent support     Tolerated pressure support 5/5 for 6 hours yesterday.  Plans for spontaneous breathing trial and possible extubation today.  On Precedex.    3/5-extubated on 3/4/2025.  Placed on BiPAP.  Has been intermittently confused.  Was on Precedex.  Has had some intermittent agitation.  When I saw the patient his eyes are open.   at bedside.  He has a good cough.    3/6- more calm then yesterday. On Bipap. Failed speech eval but they will see him again. Responding nicely to verbal commands,    3/7- more calm having thick secretions. ST has recommended MBS. He is NPO. Used Bipap last night. BP elevated today.    3/9- MR singer seen awake, but confused, restless was given seroquel for agitation. Confused and not directable only for few minutes and gets restless, throwing stuff  Was on precedex overnight but now off    3/10  Continues to be disoriented   Recd haldol yesterday  Now in restraints  Used BiPAP at night   Now on 5 L     3/11  Disoriented   Off restraints now   biPAP for 4-5 hours last night   On 5 L now     3/12   Continues to be confused   On BiPAP at night   5 L now     3/13  On BiPAP  Restless,disoriented     3/14   On BIPAP all night  Now on 4 L   Confused     3/15   On Bipap at night  Now on 4 L     3/16   Bipap at night   Now on 4 L     3/17- intermittently agitated. Sitter at bedside. A little more calm this am and answering some questions when I talked to him.     3/18-more calm this morning but may be just waking up from his sleep.  Speech saw the patient did not regain swallow.  Recommended meds to be crushed and given.  Family considering alternative modes of feeding-PEG tube.  GI consulted.  Psychiatry has seen patient.      3/19-he is more calm today but has.  The confusion and agitation.  Family has agreed  tubes.   2. Stable bilateral airspace opacities.         XR CHEST PORTABLE   Final Result   Stable appearance of the chest and support apparatus.         XR CHEST PORTABLE   Final Result   No evidence of acute cardiopulmonary process.  Stable positioning of support   apparatus.         Vascular duplex upper extremity venous bilateral   Final Result      XR CHEST PORTABLE   Final Result   Minimal vascular congestion with a small left pleural effusion.         XR CHEST PORTABLE   Final Result   No evidence of acute cardiopulmonary process.         XR CHEST PORTABLE   Final Result   No significant finding in the chest.         XR ABDOMEN FOR NG/OG/NE TUBE PLACEMENT   Final Result   Nasogastric tube with tip overlying gastric antrum.         XR CHEST PORTABLE   Final Result   1.  Lines and tubes as above.  The NG side port is near the GE junction and   could be advanced by 5-10 cm.      2.  Interstitial opacities in both lower lungs are redemonstrated.         XR CHEST PORTABLE   Final Result   Bibasilar opacities are redemonstrated.      Endotracheal tube, nasogastric tube, and left jugular line are stable.         XR CHEST PORTABLE   Final Result   Increasing basilar opacities may reflect developing infiltrate/effusions.   Study is somewhat limited secondary to rotation but no other significant   change identified.         XR CHEST PORTABLE   Final Result   Interstitial and hazy opacities in the right lower lung more than left are   still present.      Endotracheal tube, nasogastric tube, and left jugular line.         XR CHEST PORTABLE   Final Result   Overall stable chest without significant interval change.         XR CHEST PORTABLE   Final Result   Endotracheal tube, nasogastric tube, and left jugular line are stable.  NG   side port is near the GE junction and could be advanced by 5-10 cm if desired.      Stable appearance of the lungs.         CT ABDOMEN PELVIS W IV CONTRAST Additional Contrast? None   Final

## 2025-03-19 NOTE — PROGRESS NOTES
Pt remains extremely impulsive and forgetful. Able to be easily reoriented. Constantly asking for food and drink. This RN provided repeat education. Will continue to monitor.

## 2025-03-19 NOTE — PROGRESS NOTES
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     Weights Reviewed: Yes   Admission weight: 91.6 kg (201 lb 15.1 oz)   Wt Readings from Last 3 Encounters:   03/18/25 78.1 kg (172 lb 2.9 oz)   01/28/25 91.6 kg (202 lb)   01/04/25 99.3 kg (219 lb)     Intake & Output Reviewed: Yes     Intake/Output Summary (Last 24 hours) at 3/19/2025 0041  Last data filed at 3/18/2025 2145  Gross per 24 hour   Intake 830 ml   Output 975 ml   Net -145 ml       ECHOCARDIOGRAM Reviewed: Yes   Patient's Ejection Fraction (EF) is greater than 40%     Medications Reviewed: Yes   SCHEDULED HOSPITAL MEDICATIONS:   zonisamide  100 mg Oral QHS    thiamine  100 mg IntraVENous Daily    lactulose  30 g Oral Daily    sodium chloride (Inhalant)  4 mL Nebulization BID RT    QUEtiapine  50 mg Oral BID    rifAXIMin  550 mg Oral BID    Gabapentin  200 mg Per NG tube Q6H    DULoxetine  30 mg Oral Daily    cloNIDine  1 patch TransDERmal Weekly    CENTRUM/CERTA-KAYKAY with minerals oral  15 mL Oral Daily    dilTIAZem  60 mg Oral 3 times per day    metoprolol tartrate  50 mg Oral BID    polyethylene glycol  17 g Oral Daily    sodium chloride flush  5-40 mL IntraVENous 2 times per day    insulin lispro  0-16 Units SubCUTAneous Q4H    albuterol  2.5 mg Nebulization 4x Daily RT    pantoprazole (PROTONIX) 40 mg in sodium chloride (PF) 0.9 %  10 mL injection  40 mg IntraVENous Daily    enoxaparin  40 mg SubCUTAneous Daily    allopurinol  300 mg Oral Daily    vitamin D3  5,000 Units Oral Daily    atorvastatin  40 mg Oral Daily     HOME MEDICATIONS:  Prior to Admission medications    Medication Sig Start Date End Date Taking? Authorizing Provider   dilTIAZem (CARDIZEM) 120 MG tablet Take 1.5 tablets by mouth daily   Yes Navjot East MD   metoprolol succinate (TOPROL XL) 50 MG extended release tablet TAKE ONE (1) TABLET BY MOUTH IN MORNING 1/16/25  Yes Juliet Mayorga MD   albuterol sulfate HFA (PROVENTIL;VENTOLIN;PROAIR) 108 (90 Base) MCG/ACT inhaler INHALE TWO (2) PUFFS EVERY SIX (6) HOURS AS NEEDED FOR WHEEZING 1/2/25  Yes Juliet Mayorga MD   DULoxetine (CYMBALTA) 60 MG extended release capsule TAKE ONE CAPSULE BY MOUTH EVERY DAY 12/9/24  Yes Cooper Martinez MD   Alcohol Swabs PADS USE AS DIRECTED TO TEST AND INSULIN 12/4/24  Yes Cooper Martinez MD   oxyCODONE HCl (OXY-IR) 10 MG immediate release tablet Take 1 tablet by mouth every 6 hours as needed for Pain.   Yes Navjot East MD   calcium carbonate (TUMS) 500 MG chewable tablet Take 1 tablet by mouth 3 times daily as needed for Heartburn 10/25/24  Yes Shelby Hand MD   insulin glargine (LANTUS SOLOSTAR) 100 UNIT/ML injection pen Inject 15 Units into the skin nightly INJECT 15 UNITS INTO THE SKIN DAILY WITH SUPPER 10/25/24  Yes Shelby Hand MD   FEROSUL 325 (65 Fe) MG tablet TAKE ONE (1) TABLET BY MOUTH DAILY WITH BREAKFAST 10/9/24  Yes Cooper Martinez MD   folic acid (FOLVITE) 1 MG tablet TAKE ONE (1) TABLET BY MOUTH DAILY 9/10/24  Yes Cooper Martinez MD   furosemide (LASIX) 20 MG tablet TAKE ONE (1) TABLET BY MOUTH DAILY 8/23/24  Yes Cooper Martinez MD   TRUE METRIX BLOOD GLUCOSE TEST strip USE TWICE DAILY 8/8/24  Yes Cooper Martinez MD   Cholecalciferol (VITAMIN D3) 125 MCG (5000 UT) CAPS TAKE ONE (1) CAPSULE

## 2025-03-19 NOTE — PLAN OF CARE
Problem: Chronic Conditions and Co-morbidities  Goal: Patient's chronic conditions and co-morbidity symptoms are monitored and maintained or improved  Outcome: Progressing  Flowsheets (Taken 3/18/2025 2000)  Care Plan - Patient's Chronic Conditions and Co-Morbidity Symptoms are Monitored and Maintained or Improved: Monitor and assess patient's chronic conditions and comorbid symptoms for stability, deterioration, or improvement     Problem: Safety - Adult  Goal: Free from fall injury  Outcome: Progressing  Flowsheets (Taken 3/16/2025 0232 by Lamar Newton, RN)  Free From Fall Injury:   Instruct family/caregiver on patient safety   Based on caregiver fall risk screen, instruct family/caregiver to ask for assistance with transferring infant if caregiver noted to have fall risk factors     Problem: Pain  Goal: Verbalizes/displays adequate comfort level or baseline comfort level  Outcome: Progressing  Flowsheets (Taken 3/16/2025 0232 by Lamar Newton, RN)  Verbalizes/displays adequate comfort level or baseline comfort level:   Encourage patient to monitor pain and request assistance   Assess pain using appropriate pain scale   Administer analgesics based on type and severity of pain and evaluate response   Implement non-pharmacological measures as appropriate and evaluate response   Consider cultural and social influences on pain and pain management   Notify Licensed Independent Practitioner if interventions unsuccessful or patient reports new pain     Problem: Respiratory - Adult  Goal: Achieves optimal ventilation and oxygenation  Outcome: Progressing  Flowsheets (Taken 3/18/2025 2000)  Achieves optimal ventilation and oxygenation: Assess for changes in respiratory status     Problem: Skin/Tissue Integrity - Adult  Goal: Skin integrity remains intact  Description: 1.  Monitor for areas of redness and/or skin breakdown  2.  Assess vascular access sites hourly  3.  Every 4-6 hours minimum:  Change oxygen saturation

## 2025-03-19 NOTE — PROGRESS NOTES
RT Inhaler-Nebulizer Bronchodilator Protocol Note    There is a bronchodilator order in the chart from a provider indicating to follow the RT Bronchodilator Protocol and there is an “Initiate RT Inhaler-Nebulizer Bronchodilator Protocol” order as well (see protocol at bottom of note).    CXR Findings:  No results found.    The findings from the last RT Protocol Assessment were as follows:   History Pulmonary Disease: Chronic pulmonary disease  Respiratory Pattern: Dyspnea on exertion or RR 21-25 bpm  Breath Sounds: Intermittent or unilateral wheezes  Cough: Weak, productive  Indication for Bronchodilator Therapy: Decreased or absent breath sounds  Bronchodilator Assessment Score: 10    Aerosolized bronchodilator medication orders have been revised according to the RT Inhaler-Nebulizer Bronchodilator Protocol below.    Respiratory Therapist to perform RT Therapy Protocol Assessment initially then follow the protocol.  Repeat RT Therapy Protocol Assessment PRN for score 0-3 or on second treatment, BID, and PRN for scores above 3.    No Indications - adjust the frequency to every 6 hours PRN wheezing or bronchospasm, if no treatments needed after 48 hours then discontinue using Per Protocol order mode.     If indication present, adjust the RT bronchodilator orders based on the Bronchodilator Assessment Score as indicated below.  Use Inhaler orders unless patient has one or more of the following: on home nebulizer, not able to hold breath for 10 seconds, is not alert and oriented, cannot activate and use MDI correctly, or respiratory rate 25 breaths per minute or more, then use the equivalent nebulizer order(s) with same Frequency and PRN reasons based on the score.  If a patient is on this medication at home then do not decrease Frequency below that used at home.    0-3 - enter or revise RT bronchodilator order(s) to equivalent RT Bronchodilator order with Frequency of every 4 hours PRN for wheezing or increased work

## 2025-03-19 NOTE — PROGRESS NOTES
4 Eyes Skin Assessment     NAME:  Sandor Early  YOB: 1955  MEDICAL RECORD NUMBER:  [id number]    The patient is being assessed for  Shift Handoff    I agree that at least one RN has performed a thorough Head to Toe Skin Assessment on the patient. ALL assessment sites listed below have been assessed.      Areas assessed by both nurses:    Head, Face, Ears, Shoulders, Back, Chest, Arms, Elbows, Hands, Sacrum. Buttock, Coccyx, Ischium, Legs. Feet and Heels, and Under Medical Devices         Does the Patient have a Wound? No noted wound(s)       Abraham Prevention initiated by RN: Yes  Wound Care Orders initiated by RN: No    Pressure Injury (Stage 3,4, Unstageable, DTI, NWPT, and Complex wounds) if present, place Wound referral order by RN under : No    New Ostomies, if present place, Ostomy referral order under : No     Nurse 1 eSignature: Electronically signed by Manolo Linn RN on 3/19/25 at 12:23 AM EDT    **SHARE this note so that the co-signing nurse can place an eSignature**    Nurse 2 eSignature: Electronically signed by Lois Baker RN on 3/19/25 at 5:49 AM EDT    Patient is able to demonstrate the ability to move from a reclining position to an upright position within the recliner.

## 2025-03-19 NOTE — PROGRESS NOTES
03/18/25 2332   NIV Type   NIV Started/Stopped On   Equipment Type v60   Mode Bilevel   Mask Type Full face mask   Mask Size Extra large   Assessment   Pulse 85   Respirations 16   SpO2 94 %   Comfort Level Good   Using Accessory Muscles No   Mask Compliance Good   Skin Assessment Clean, dry, & intact   Skin Protection for O2 Device Yes   Orientation Middle   Location Nose   Intervention(s) Skin Barrier   Breath Sounds   Respiratory Pattern Regular   Right Upper Lobe Diminished   Right Middle Lobe Diminished   Right Lower Lobe Diminished   Left Upper Lobe Diminished   Left Lower Lobe Diminished   Settings/Measurements   PIP Observed 16 cm H20   IPAP 15 cmH20   CPAP/EPAP 5 cmH2O   Vt (Measured) 430 mL   Rate Ordered 18   Insp Rise Time (%) 1 %   FiO2  40 %   I Time/ I Time % 0.9 s   Minute Volume (L/min) 9.5 Liters   Mask Leak (lpm) 58 lpm   Patient's Home Machine No   Alarm Settings   Alarms On Y   Low Pressure (cmH2O) 5 cmH2O   High Pressure (cmH2O) 40 cmH2O   Apnea (secs) 20 secs   RR Low (bpm) 12   RR High (bpm) 40 br/min

## 2025-03-19 NOTE — PROGRESS NOTES
RT Inhaler-Nebulizer Bronchodilator Protocol Note    There is a bronchodilator order in the chart from a provider indicating to follow the RT Bronchodilator Protocol and there is an “Initiate RT Inhaler-Nebulizer Bronchodilator Protocol” order as well (see protocol at bottom of note).    CXR Findings:  No results found.    The findings from the last RT Protocol Assessment were as follows:   History Pulmonary Disease: Chronic pulmonary disease  Respiratory Pattern: Dyspnea on exertion or RR 21-25 bpm  Breath Sounds: Slightly diminished and/or crackles  Cough: Weak, productive  Indication for Bronchodilator Therapy: Decreased or absent breath sounds  Bronchodilator Assessment Score: 8    Aerosolized bronchodilator medication orders have been revised according to the RT Inhaler-Nebulizer Bronchodilator Protocol below.    Respiratory Therapist to perform RT Therapy Protocol Assessment initially then follow the protocol.  Repeat RT Therapy Protocol Assessment PRN for score 0-3 or on second treatment, BID, and PRN for scores above 3.    No Indications - adjust the frequency to every 6 hours PRN wheezing or bronchospasm, if no treatments needed after 48 hours then discontinue using Per Protocol order mode.     If indication present, adjust the RT bronchodilator orders based on the Bronchodilator Assessment Score as indicated below.  Use Inhaler orders unless patient has one or more of the following: on home nebulizer, not able to hold breath for 10 seconds, is not alert and oriented, cannot activate and use MDI correctly, or respiratory rate 25 breaths per minute or more, then use the equivalent nebulizer order(s) with same Frequency and PRN reasons based on the score.  If a patient is on this medication at home then do not decrease Frequency below that used at home.    0-3 - enter or revise RT bronchodilator order(s) to equivalent RT Bronchodilator order with Frequency of every 4 hours PRN for wheezing or increased work

## 2025-03-19 NOTE — PROGRESS NOTES
[facility]                            Critical care Progress Note :    CC: follow on RF post vent     Events of Last 24 hours:    BiPAP 15/5 fr 6 hours   Down to 4 L   Secretions better   Still cannot swallow   Some agitation at night   Speech will see again ,had modified ,plan for PEG tube by GI     Vascular lines: PIV only           IV:   sodium chloride      sodium chloride      dextrose         Vitals:  Blood pressure (!) 157/73, pulse 100, temperature 98.2 °F (36.8 °C), temperature source Temporal, resp. rate 21, height 1.702 m (5' 7.01\"), weight 76.4 kg (168 lb 6.9 oz), SpO2 98%.  on 6 L    EXAM:  General: ill appearing.    Eyes: No sclera icterus. No conjunctival injection.  ENT: No discharge.   Neck: Trachea midline.   Resp: No accessory muscle use. Few crackles. No wheezing. Few rhonchi.   CV: Regular  rate. Regular rhythm. No mumur or rub. No edema.   GI: Non-tender. Non-distended.   Skin: Warm and dry. No rash on exposed extremities.  M/S: No cyanosis. No clubbing.   Neuro: + following commands.  Good cough.   Psych: lethargic today        Scheduled Meds:   zonisamide  100 mg Oral QHS    thiamine  100 mg IntraVENous Daily    lactulose  30 g Oral Daily    sodium chloride (Inhalant)  4 mL Nebulization BID RT    QUEtiapine  50 mg Oral BID    rifAXIMin  550 mg Oral BID    Gabapentin  200 mg Per NG tube Q6H    DULoxetine  30 mg Oral Daily    cloNIDine  1 patch TransDERmal Weekly    CENTRUM/CERTA-KAYKAY with minerals oral  15 mL Oral Daily    dilTIAZem  60 mg Oral 3 times per day    metoprolol tartrate  50 mg Oral BID    polyethylene glycol  17 g Oral Daily    sodium chloride flush  5-40 mL IntraVENous 2 times per day    insulin lispro  0-16 Units SubCUTAneous Q4H    albuterol  2.5 mg Nebulization 4x Daily RT    pantoprazole (PROTONIX) 40 mg in sodium chloride (PF) 0.9 % 10 mL injection  40 mg IntraVENous Daily    enoxaparin  40 mg SubCUTAneous Daily

## 2025-03-19 NOTE — PROGRESS NOTES
Inpatient Occupational Therapy Treatment    Unit: ICU  Date:  3/19/2025  Patient Name:    Sandor Early  Admitting diagnosis:  Hypomagnesemia [E83.42]  Influenza A [J10.1]  COPD exacerbation (HCC) [J44.1]  Respiratory failure with hypoxia (HCC) [J96.91]  Alcohol use disorder [F10.90]  Acute on chronic respiratory failure with hypoxia and hypercapnia (HCC) [J96.21, J96.22]  Acute respiratory failure (HCC) [J96.00]  Admit Date:  1/30/2025  Precautions/Restrictions/WB Status/ Lines/ Wounds/ Oxygen: Fall risk, Bed/chair alarm, Lines (IV, Supplemental O2 (4L)), Confusion, Telemetry, Continuous pulse oximetry, Telesitter, and Isolation Precautions: Contact; no BP on the RUE. Sitter present in room.       Treatment Time:  14:50-15:33  Treatment Number:  4  Timed Code Treatment Minutes: 43 minutes  Total Treatment Minutes:  43  minutes    Patient Goals for Therapy: none stated          Discharge Recommendations: SNF  DME needs for discharge: Defer to facility       Therapy recommendations for staff:   Assist of 2 for transfers with use of rolling walker (RW) and gait belt to/from chair     History of Present Illness: per H&P   Sandor is a 69 y.o. male with a history of COPD on 4 L of O2 chronically, GERD, hyperlipidemia, alcohol liver cirrhosis, who presents to the emergency department by EMS for shortness of breath.  Patient reports that he has felt short of breath with increasing cough and fever over the past 3 to 4 days.  Upon EMS arrival patient was in moderate to severe respiratory distress O2 sat 88%.  He was placed on O2.  He received Solu-Medrol and DuoNeb.  EMS team stated that he was agitated trying to get out of the squad and complaining of diffuse pain back legs etc. he has a history of neuropathy.  They then gave him 10 mg of morphine.  Upon arrival patient is ill-appearing in respiratory distress poor skin color very tachypneic retractions.  He denies chest pain.     Patient has a history of alcohol use

## 2025-03-19 NOTE — CONSULTS
GASTROENTEROLOGY INPATIENT CONSULTATION        IDENTIFYING DATA/REASON FOR CONSULTATION   PATIENT:  Sandor Early  MRN:  [id number]  ADMIT DATE: 1/30/2025  TIME OF EVALUATION: 3/19/2025 3:44 PM  HOSPITAL STAY:   LOS: 48 days     REASON FOR CONSULTATION:  PEG tube evaluation    HISTORY OF PRESENT ILLNESS   Sandor Early is a 69 y.o. male with a PMH of recent ileitis with SBO, congestive heart failure, COPD, diabetes, hypertension and Rockwell cirrhosis who presented on 1/30/2025 with hypoxia and shortness of breath. Admitted for flu, copd exacerbation, course complicated with development of SBO, respiratory failure needing vent support. We have been consulted for PEG tube placement. He has a history of R[place] cirrhosis. Recent contrast CT in 2/2025 with cirrhosis and mild ascites. Patient is disoriented and unable to provide HPI/ROS.      Prior Endoscopic Evaluations:  Colonoscopy with Dr. Gifford on 5/10/2022. Diverticular disease. No polyps. Normal terminal ileum.     PAST MEDICAL, SURGICAL, FAMILY, and SOCIAL HISTORY     Past Medical History:   Diagnosis Date    Bladder outlet obstruction 03/05/2017    Carpal tunnel syndrome of left wrist 04/02/2013    Cellulitis of right lower extremity 03/27/2023    Cellulitis of right upper extremity 03/01/2023    w/ Group A Strep bacteremia    COPD exacerbation (HCC) 03/02/2023    Cough syncope 01/10/2023    Diabetic ketoacidosis without coma associated with type 2 diabetes mellitus (HCC) 02/18/2018    GI bleed 05/08/2022    Gout 02/01/2013    Hilar adenopathy     Iron deficiency anemia     Malignant neoplasm of urinary bladder (HCC) 02/09/2016    Multiple duodenal ulcers     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 swine flu vaccination    Severe claudication 01/24/2020    Ulnar nerve entrapment 02/09/2016    Uncontrolled hypertension 11/12/2019     Past Surgical  mL, PRN  sodium chloride, , PRN  diatrizoate meglumine-sodium, 12 mL, ONCE PRN  guaiFENesin, 200 mg, Q4H PRN  benzocaine, , 4x Daily PRN  phenol, 1 spray, Q2H PRN  sodium chloride, , PRN  acetaminophen, 650 mg, Q6H PRN   Or  acetaminophen, 650 mg, Q6H PRN  dextrose bolus, 125 mL, PRN   Or  dextrose bolus, 250 mL, PRN  glucagon (rDNA), 1 mg, PRN  dextrose, , Continuous PRN  potassium chloride, 10 mEq, PRN  magnesium sulfate, 2,000 mg, PRN  glucose, 4 tablet, PRN      ALLERGIES:  He   Allergies   Allergen Reactions    Lisinopril Swelling and Other (See Comments)    Ace Inhibitors Swelling and Other (See Comments)    Influenza Vaccines Other (See Comments)     He states he was hospitalized when he received his first flu vaccine    Tiotropium Bromide Monohydrate Swelling and Other (See Comments)     Tolerates both tudorza and incruse  On Trelegy.    Vilanterol     Diazepam Anxiety       REVIEW OF SYSTEMS   Pertinent ROS noted in HPI    PHYSICAL EXAM     Vitals:    03/19/25 0653 03/19/25 0801 03/19/25 0819 03/19/25 1100   BP:  (!) 146/103 (!) 157/73    Pulse: 95 97 100 91   Resp: 18 21 21 15   Temp:    97 °F (36.1 °C)   TempSrc:    Temporal   SpO2: 97% 98% 98% 95%   Weight:       Height:           I/O last 3 completed shifts:  In: 1543 [I.V.:10; NG/GT:1533]  Out: 1675 [Urine:1675]      Physical Exam:  General appearance: NAD, ill-appearing  Eyes: Anicteric  Head: Normocephalic, without obvious abnormality  Lungs: clear to auscultation bilaterally, Normal Effort  Heart: regular rate and rhythm, normal S1 and S2, no murmurs or rubs  Abdomen: soft, non-distended, non-tender. Bowel sounds normal. No masses,  no organomegaly. Large midline scar noted  Extremities: atraumatic, no cyanosis or edema  Skin: warm and dry, no jaundice  Neuro: No focal deficits      LABS AND IMAGING   Laboratory   Recent Labs     03/19/25  0426   WBC 8.4   HGB 9.8*   HCT 30.4*   MCV 87.4        Recent Labs     03/17/25  1049 03/18/25  1739

## 2025-03-19 NOTE — PROGRESS NOTES
AM assessment complete, see flowsheet. Medications given per MAR. Pt in bed resting quietly. Alert to self and intermittently to situation and place. Is still very impulsive and has frequent short term memory issues. Pt tolerated meds with pudding fine. IV lines and monitors checked and tightened. No other needs noted, awaiting MD rounds.

## 2025-03-20 ENCOUNTER — ANESTHESIA EVENT (OUTPATIENT)
Dept: ENDOSCOPY | Age: 70
End: 2025-03-20
Payer: MEDICARE

## 2025-03-20 LAB
ANION GAP SERPL CALCULATED.3IONS-SCNC: 8 MMOL/L (ref 3–16)
BASOPHILS # BLD: 0.1 K/UL (ref 0–0.2)
BASOPHILS NFR BLD: 0.8 %
BUN SERPL-MCNC: 11 MG/DL (ref 7–20)
CALCIUM SERPL-MCNC: 9.7 MG/DL (ref 8.3–10.6)
CHLORIDE SERPL-SCNC: 98 MMOL/L (ref 99–110)
CO2 SERPL-SCNC: 33 MMOL/L (ref 21–32)
CREAT SERPL-MCNC: 0.7 MG/DL (ref 0.8–1.3)
DEPRECATED RDW RBC AUTO: 17.6 % (ref 12.4–15.4)
EOSINOPHIL # BLD: 0.5 K/UL (ref 0–0.6)
EOSINOPHIL NFR BLD: 5.5 %
GFR SERPLBLD CREATININE-BSD FMLA CKD-EPI: >90 ML/MIN/{1.73_M2}
GLUCOSE BLD-MCNC: 104 MG/DL (ref 70–99)
GLUCOSE BLD-MCNC: 105 MG/DL (ref 70–99)
GLUCOSE BLD-MCNC: 106 MG/DL (ref 70–99)
GLUCOSE BLD-MCNC: 108 MG/DL (ref 70–99)
GLUCOSE BLD-MCNC: 109 MG/DL (ref 70–99)
GLUCOSE BLD-MCNC: 90 MG/DL (ref 70–99)
GLUCOSE SERPL-MCNC: 98 MG/DL (ref 70–99)
HCT VFR BLD AUTO: 34.4 % (ref 40.5–52.5)
HGB BLD-MCNC: 10.8 G/DL (ref 13.5–17.5)
LYMPHOCYTES # BLD: 2.1 K/UL (ref 1–5.1)
LYMPHOCYTES NFR BLD: 25.5 %
MCH RBC QN AUTO: 27.5 PG (ref 26–34)
MCHC RBC AUTO-ENTMCNC: 31.2 G/DL (ref 31–36)
MCV RBC AUTO: 87.9 FL (ref 80–100)
MONOCYTES # BLD: 0.9 K/UL (ref 0–1.3)
MONOCYTES NFR BLD: 10.6 %
NEUTROPHILS # BLD: 4.8 K/UL (ref 1.7–7.7)
NEUTROPHILS NFR BLD: 57.6 %
PERFORMED ON: ABNORMAL
PERFORMED ON: NORMAL
PLATELET # BLD AUTO: 363 K/UL (ref 135–450)
PMV BLD AUTO: 7.6 FL (ref 5–10.5)
POTASSIUM SERPL-SCNC: 3.7 MMOL/L (ref 3.5–5.1)
RBC # BLD AUTO: 3.92 M/UL (ref 4.2–5.9)
SODIUM SERPL-SCNC: 139 MMOL/L (ref 136–145)
WBC # BLD AUTO: 8.4 K/UL (ref 4–11)

## 2025-03-20 PROCEDURE — 6360000002 HC RX W HCPCS: Performed by: INTERNAL MEDICINE

## 2025-03-20 PROCEDURE — 2700000000 HC OXYGEN THERAPY PER DAY

## 2025-03-20 PROCEDURE — 6360000002 HC RX W HCPCS: Performed by: PHYSICIAN ASSISTANT

## 2025-03-20 PROCEDURE — 2500000003 HC RX 250 WO HCPCS: Performed by: INTERNAL MEDICINE

## 2025-03-20 PROCEDURE — 6370000000 HC RX 637 (ALT 250 FOR IP): Performed by: INTERNAL MEDICINE

## 2025-03-20 PROCEDURE — 99232 SBSQ HOSP IP/OBS MODERATE 35: CPT | Performed by: INTERNAL MEDICINE

## 2025-03-20 PROCEDURE — 80048 BASIC METABOLIC PNL TOTAL CA: CPT

## 2025-03-20 PROCEDURE — 2580000003 HC RX 258: Performed by: INTERNAL MEDICINE

## 2025-03-20 PROCEDURE — 97530 THERAPEUTIC ACTIVITIES: CPT

## 2025-03-20 PROCEDURE — 94660 CPAP INITIATION&MGMT: CPT

## 2025-03-20 PROCEDURE — 97110 THERAPEUTIC EXERCISES: CPT

## 2025-03-20 PROCEDURE — 36415 COLL VENOUS BLD VENIPUNCTURE: CPT

## 2025-03-20 PROCEDURE — 94669 MECHANICAL CHEST WALL OSCILL: CPT

## 2025-03-20 PROCEDURE — 2060000000 HC ICU INTERMEDIATE R&B

## 2025-03-20 PROCEDURE — 51798 US URINE CAPACITY MEASURE: CPT

## 2025-03-20 PROCEDURE — 2580000003 HC RX 258: Performed by: PHYSICIAN ASSISTANT

## 2025-03-20 PROCEDURE — 94761 N-INVAS EAR/PLS OXIMETRY MLT: CPT

## 2025-03-20 PROCEDURE — 94640 AIRWAY INHALATION TREATMENT: CPT

## 2025-03-20 PROCEDURE — 85025 COMPLETE CBC W/AUTO DIFF WBC: CPT

## 2025-03-20 RX ADMIN — Medication 4 ML: at 07:43

## 2025-03-20 RX ADMIN — SODIUM CHLORIDE, PRESERVATIVE FREE 10 ML: 5 INJECTION INTRAVENOUS at 20:55

## 2025-03-20 RX ADMIN — ALBUTEROL SULFATE 2.5 MG: 2.5 SOLUTION RESPIRATORY (INHALATION) at 19:35

## 2025-03-20 RX ADMIN — ALBUTEROL SULFATE 2.5 MG: 2.5 SOLUTION RESPIRATORY (INHALATION) at 15:14

## 2025-03-20 RX ADMIN — METOPROLOL TARTRATE 50 MG: 50 TABLET, FILM COATED ORAL at 20:35

## 2025-03-20 RX ADMIN — ZONISAMIDE 100 MG: 25 CAPSULE ORAL at 20:53

## 2025-03-20 RX ADMIN — GABAPENTIN 200 MG: 250 SOLUTION ORAL at 15:24

## 2025-03-20 RX ADMIN — ENOXAPARIN SODIUM 40 MG: 100 INJECTION SUBCUTANEOUS at 13:29

## 2025-03-20 RX ADMIN — SODIUM CHLORIDE, PRESERVATIVE FREE 10 ML: 5 INJECTION INTRAVENOUS at 08:23

## 2025-03-20 RX ADMIN — CEFAZOLIN 2000 MG: 2 INJECTION, POWDER, FOR SOLUTION INTRAVENOUS at 05:36

## 2025-03-20 RX ADMIN — OXYCODONE 5 MG: 5 TABLET ORAL at 02:27

## 2025-03-20 RX ADMIN — THIAMINE HYDROCHLORIDE 100 MG: 100 INJECTION, SOLUTION INTRAMUSCULAR; INTRAVENOUS at 08:23

## 2025-03-20 RX ADMIN — DILTIAZEM HYDROCHLORIDE 60 MG: 60 TABLET ORAL at 05:37

## 2025-03-20 RX ADMIN — GABAPENTIN 200 MG: 250 SOLUTION ORAL at 02:26

## 2025-03-20 RX ADMIN — ALBUTEROL SULFATE 2.5 MG: 2.5 SOLUTION RESPIRATORY (INHALATION) at 10:57

## 2025-03-20 RX ADMIN — PANTOPRAZOLE SODIUM 40 MG: 40 INJECTION, POWDER, LYOPHILIZED, FOR SOLUTION INTRAVENOUS at 08:23

## 2025-03-20 RX ADMIN — GABAPENTIN 200 MG: 250 SOLUTION ORAL at 20:35

## 2025-03-20 RX ADMIN — RIFAXIMIN 550 MG: 550 TABLET ORAL at 20:35

## 2025-03-20 RX ADMIN — DILTIAZEM HYDROCHLORIDE 60 MG: 60 TABLET ORAL at 20:35

## 2025-03-20 RX ADMIN — ALBUTEROL SULFATE 2.5 MG: 2.5 SOLUTION RESPIRATORY (INHALATION) at 07:43

## 2025-03-20 RX ADMIN — PHENOL 1 SPRAY: 1.5 LIQUID ORAL at 02:33

## 2025-03-20 RX ADMIN — DILTIAZEM HYDROCHLORIDE 60 MG: 60 TABLET ORAL at 15:24

## 2025-03-20 RX ADMIN — PHENOL 1 SPRAY: 1.5 LIQUID ORAL at 20:47

## 2025-03-20 RX ADMIN — Medication 4 ML: at 19:31

## 2025-03-20 RX ADMIN — QUETIAPINE FUMARATE 50 MG: 25 TABLET ORAL at 20:35

## 2025-03-20 ASSESSMENT — PAIN DESCRIPTION - DESCRIPTORS
DESCRIPTORS: SORE
DESCRIPTORS: ACHING
DESCRIPTORS: SORE

## 2025-03-20 ASSESSMENT — PAIN SCALES - GENERAL
PAINLEVEL_OUTOF10: 0
PAINLEVEL_OUTOF10: 2
PAINLEVEL_OUTOF10: 4
PAINLEVEL_OUTOF10: 0
PAINLEVEL_OUTOF10: 2

## 2025-03-20 ASSESSMENT — PAIN DESCRIPTION - LOCATION
LOCATION: THROAT
LOCATION: BACK
LOCATION: THROAT

## 2025-03-20 ASSESSMENT — PAIN - FUNCTIONAL ASSESSMENT: PAIN_FUNCTIONAL_ASSESSMENT: ACTIVITIES ARE NOT PREVENTED

## 2025-03-20 NOTE — PROGRESS NOTES
03/20/25 0320   NIV Type   NIV Started/Stopped On   Equipment Type v60   Mode Bilevel   Mask Type Full face mask   Mask Size Extra large   Settings/Measurements   IPAP 15 cmH20   CPAP/EPAP 5 cmH2O   Vt (Measured) 717 mL   Rate Ordered 18   FiO2  40 %   I Time/ I Time % 0.9 s   Minute Volume (L/min) 10.4 Liters   Mask Leak (lpm) 50 lpm   Patient's Home Machine No

## 2025-03-20 NOTE — PROGRESS NOTES
[facility]                            Critical care Progress Note :    CC: follow on RF post vent     Events of Last 24 hours:    BiPAP 15/5 fr 6 hours   Down to 4 L   Secretions better   For peg tube   Some agitation at night   Speech will see again ,had modified ,plan for PEG tube by GI       IV:   sodium chloride      sodium chloride      dextrose         Vitals:  Blood pressure (!) 142/89, pulse 75, temperature 98.2 °F (36.8 °C), temperature source Axillary, resp. rate 14, height 1.702 m (5' 7.01\"), weight 76.7 kg (169 lb 1.5 oz), SpO2 99%.  on 6 L    EXAM:  General: not in distress.    Eyes: No sclera icterus. No conjunctival injection.  ENT: No discharge.   Neck: Trachea midline. Has possible knot chest couild be cyst to folow OP ,pt aware   Resp: No accessory muscle use. Few crackles. No wheezing. Few rhonchi.   CV: Regular  rate. Regular rhythm. No mumur or rub. No edema.   GI: Non-tender. Non-distended.   Skin: Warm and dry. No rash on exposed extremities.  M/S: No cyanosis. No clubbing.   Neuro: + following commands.  Good cough.   Psych: lethargic today        Scheduled Meds:   QUEtiapine  50 mg Oral QHS    zonisamide  100 mg Oral QHS    thiamine  100 mg IntraVENous Daily    lactulose  30 g Oral Daily    sodium chloride (Inhalant)  4 mL Nebulization BID RT    rifAXIMin  550 mg Oral BID    Gabapentin  200 mg Per NG tube Q6H    DULoxetine  30 mg Oral Daily    cloNIDine  1 patch TransDERmal Weekly    CENTRUM/CERTA-KAYKAY with minerals oral  15 mL Oral Daily    dilTIAZem  60 mg Oral 3 times per day    metoprolol tartrate  50 mg Oral BID    polyethylene glycol  17 g Oral Daily    sodium chloride flush  5-40 mL IntraVENous 2 times per day    insulin lispro  0-16 Units SubCUTAneous Q4H    albuterol  2.5 mg Nebulization 4x Daily RT    pantoprazole (PROTONIX) 40 mg in sodium chloride (PF) 0.9 % 10 mL injection  40 mg IntraVENous Daily    enoxaparin  40 mg  SubCUTAneous Daily    allopurinol  300 mg Oral Daily    vitamin D3  5,000 Units Oral Daily    atorvastatin  40 mg Oral Daily       Data:  CBC:   Recent Labs     03/19/25  0426 03/20/25  0434   WBC 8.4 8.4   HGB 9.8* 10.8*   HCT 30.4* 34.4*   MCV 87.4 87.9    363     BMP:   Recent Labs     03/18/25  0436 03/19/25  0426 03/20/25  0434   * 134* 139   K 3.8 3.6 3.7   CL 94* 93* 98*   CO2 33* 33* 33*   BUN 13 12 11   CREATININE 0.6* 0.6* 0.7*     LIVER PROFILE:   No results for input(s): \"AST\", \"ALT\", \"LIPASE\", \"AMYLASE\", \"BILIDIR\", \"BILITOT\", \"ALKPHOS\" in the last 72 hours.    Invalid input(s): \"ALB\"      Microbiology:  1/20 Flu DETECTED  2/5 Strep and leg negative   2/13 BC NGTD  2/13 respiratory MRSA  2/13 MRSA DNA probe +   2/21 BAL NGTD      Imaging:  CXR 3/4 images independently reviewed by me and showed:  Some improved perihilar opacities centrally.         ASSESSMENT:  Acute hypoxemic hypercapnic respiratory failure   Agitation/restlessness  Acute encephalopathy/Metabolic encephalopathy   Tracheobronchitis with possible PNA-MRSA  RUE edema- SVT  COPD   Alcohol withdrawal with Delirium tremens  Ileus/Enteritis   Chronic hypoxia on 4 L O2  Flu A + 1/30  Tobacco abuse  ROCKWELL cirrhosis  Chronic diastolic dysfunction  DM2  PVD status post aortoiliac bpg  Patient has had several episodes of seizure-like activity   Alcohol use/abuse 30 beer/day        PLAN:  Supplemental oxygen to maintain SaO2 >92%; wean as tolerated  BIPAP QHS and PRN during the day   Wean o2   Follow speech therapy recommendation   Inhaled bronchodilators  Metanebs and prn nt suctioning  Seroquel 50 PO daily  Urology for obstruction   Valium 5 mg D/C   For PEG   ,all abx stopped   Completed Vanc D#10/10, Zosyn D#8 and Diflucan D#7, Tamiflu D#5 and Zpak  Keppra started by neurology- Seizure and fall precautions  Cardizem, Metoprolol 50mg BID, Clonidine patch  Hydralazine 10 mg IV Q4 hrs PRN for SBP>160   Lactulose and Rifaximin - Follow

## 2025-03-20 NOTE — PROGRESS NOTES
Shift assessment and scheduled med pass completed at this time (see flowsheet and eMAR). Prior to meds patient sat in a high fowlers, oral care performed and meds provided crushed in pudding with small ice chip prior to wet mouth. Patient managed without difficulty. Patient instructed to sit up for total of 30 min to help prevent aspiration. Patient verbalizes understanding and proceeds to attempt to lay down, RN reminded to sit up. VSS on monitor with HFNC in place. Patient expectorated what appeared to be dried clots, patient said came from his nose. No evidence of bright red blood or epistaxis. Patient also mentions that he has a palpable tender \"bump\" on the left upper portion of chest just under clavicle. Noted firmness palpated, no redness or warmth noted. Bed in low locked position with rails 3/4 in place, sitter at bedside, call light and decide table within reach. No other needs at this time.

## 2025-03-20 NOTE — PROGRESS NOTES
D: Rounded with Dr. Mckeon. Discussed the patient. Patient is going to for PEG tube today, Urology consult today, D/c sitter. Patient was c/o a lump on his chest. This is palpated over his sternum. Circled with a maker. Dr. Mckeon notified and assessed. Electronically signed by Leobardo Majano RN on 3/20/2025 at 9:16 AM

## 2025-03-20 NOTE — PROGRESS NOTES
Inpatient Physical Therapy Evaluation & Treatment    Unit: ICU  Date:  3/20/2025  Patient Name:    Sandor Early  Admitting diagnosis:  Hypomagnesemia [E83.42]  Influenza A [J10.1]  COPD exacerbation (HCC) [J44.1]  Respiratory failure with hypoxia (HCC) [J96.91]  Alcohol use disorder [F10.90]  Acute on chronic respiratory failure with hypoxia and hypercapnia (HCC) [J96.21, J96.22]  Acute respiratory failure (HCC) [J96.00]  Admit Date:  1/30/2025  Precautions/Restrictions/WB Status/ Lines/ Wounds/ Oxygen: Fall risk, Bed/chair alarm, Lines (Supplemental O2 (4L) and internal catheter), Telemetry, Continuous pulse oximetry, and Isolation Precautions: Contact    Treatment Time:  1323 - 1403  Treatment Number:  3   Timed Code Treatment Minutes: 40 minutes  Total Treatment Minutes:  40  minutes    Patient Stated Goals for Therapy: \" get back in bed \"          Discharge Recommendations: SNF  DME needs for discharge: Defer to facility       Therapy recommendation for EMS Transport: requires transport by cot due to pt needs lift equipment for safe transfers, pt needs A x 2 for safe transfers, and pt with poor sitting balance/tolerance    Therapy recommendations for staff:   Assist of 2 for transfers with use of rolling walker (RW) and gait belt to/from chair    History of Present Illness: per H&P   Sandor is a 69 y.o. male with a history of COPD on 4 L of O2 chronically, GERD, hyperlipidemia, alcohol liver cirrhosis, who presents to the emergency department by EMS for shortness of breath.  Patient reports that he has felt short of breath with increasing cough and fever over the past 3 to 4 days.  Upon EMS arrival patient was in moderate to severe respiratory distress O2 sat 88%.  He was placed on O2.  He received Solu-Medrol and DuoNeb.  EMS team stated that he was agitated trying to get out of the squad and complaining of diffuse pain back legs etc. he has a history of neuropathy.  They then gave him 10 mg of

## 2025-03-20 NOTE — CONSULTS
Laterality Date    ANKLE FRACTURE SURGERY Left 2/12/2024    OPEN REDUCTION INTERNAL FIXATION OF LEFT ANKLE FRACTURE DISLOCATION WITH SYNDESMOSIS REPAIR performed by Sudeep Ruth MD at [facility] OR    AORTO-ILIAC BYPASS GRAFT N/A 08/24/2020    AORTOBIFEMORAL BYPASS GRAFT performed by Sam Fiar MD at [facility] OR    BRONCHOSCOPY  02/07/2011    bronch with EBUS    CATARACT REMOVAL Left     COLONOSCOPY N/A 07/23/2018    COLONOSCOPY WITH BIOPSY performed by Maicol Fournier MD at [facility] ENDOSCOPY    COLONOSCOPY N/A 07/23/2018    COLONOSCOPY POLYPECTOMY SNARE/COLD BIOPSY performed by Maicol Fournier MD at [facility] ENDOSCOPY    COLONOSCOPY N/A 05/10/2022    COLON W/ANES. performed by Diana Gifford MD at [facility] ENDOSCOPY    CYSTOSCOPY  02/04/2015    Urethral Dilatation, Resection of Bladder Tumor    CYSTOSCOPY  05/06/2014    w/ bladder biopsy    CYSTOSCOPY  09/09/2015    w/ urethral dilatation, bladder tumor follow up    CYSTOSCOPY  02/24/2016    fulgeration of bladder tumor    ELBOW SURGERY Left     ENDOSCOPY, SMALL BOWEL N/A 02/20/2019    BOWEL SMALL CAPSULE ENDOSCOPY performed by Maicol Fournier MD at [facility] ENDOSCOPY    PERINEAL SURGERY N/A 10/22/2024    PERINEAL INCISION AND DRAINAGE performed by Samuel Velasquez MD at [facility] OR    UPPER GASTROINTESTINAL ENDOSCOPY N/A 07/23/2018    EGD BIOPSY performed by Maicol Fournier MD at [facility] ENDOSCOPY    UPPER GASTROINTESTINAL ENDOSCOPY N/A 05/09/2022    EGD BIOPSY performed by Diana Gifford MD at [facility] ENDOSCOPY       Social History:  Social History     Socioeconomic History    Marital status:      Spouse name: Not on file    Number of children: 1    Years of education: Not on file    Highest education level: Not on file   Occupational History    Occupation: [employer]     Comment: not working currently   Tobacco Use    Smoking status: Every Day     Current packs/day: 0.50     Average packs/day: 0.5 packs/day for  89.1 years (44.6 ttl pk-yrs)     Types: Cigarettes, Cigars     Start date: 1973    Smokeless tobacco: Former     Types: Snuff   Vaping Use    Vaping status: Former    Substances: Always   Substance and Sexual Activity    Alcohol use: Yes     Alcohol/week: 8.0 standard drinks of alcohol     Types: 8 Cans of beer per week    Drug use: No    Sexual activity: Yes     Partners: Female   Other Topics Concern    Not on file   Social History Narrative    Not on file     Social Drivers of Health     Financial Resource Strain: Low Risk  (3/20/2024)    Overall Financial Resource Strain (CARDIA)     Difficulty of Paying Living Expenses: Not hard at all   Food Insecurity: No Food Insecurity (1/30/2025)    Hunger Vital Sign     Worried About Running Out of Food in the Last Year: Never true     Ran Out of Food in the Last Year: Never true   Transportation Needs: No Transportation Needs (1/30/2025)    PRAPARE - Transportation     Lack of Transportation (Medical): No     Lack of Transportation (Non-Medical): No   Physical Activity: Inactive (1/10/2023)    Exercise Vital Sign     Days of Exercise per Week: 0 days     Minutes of Exercise per Session: 0 min   Stress: No Stress Concern Present (9/12/2022)    [employer] [place] [employer] - Occupational Stress Questionnaire     Feeling of Stress : Only a little   Social Connections: Socially Isolated (9/12/2022)    Social Connection and Isolation Panel [NHANES]     Frequency of Communication with Friends and Family: Twice a week     Frequency of Social Gatherings with Friends and Family: Twice a week     Attends [employer] Services: Never     Active Member of Clubs or Organizations: No     Attends Club or Organization Meetings: Not on file     Marital Status:    Intimate Partner Violence: Not on file   Housing Stability: Low Risk  (1/30/2025)    Housing Stability Vital Sign     Unable to Pay for Housing in the Last Year: No     Number of Times Moved in the Last Year: 1

## 2025-03-20 NOTE — PLAN OF CARE
Problem: Respiratory - Adult  Goal: Achieves optimal ventilation and oxygenation  Outcome: Progressing  Flowsheets (Taken 3/18/2025 2000 by Manolo Linn RN)  Achieves optimal ventilation and oxygenation: Assess for changes in respiratory status  Note: HOB 30 degrees, Respirations easy and even. Speech is clear. Alert and oriented times 4. Family at bedside.      Problem: Cardiovascular - Adult  Goal: Maintains optimal cardiac output and hemodynamic stability  Outcome: Progressing  Flowsheets (Taken 3/16/2025 2000 by Viki Tran RN)  Maintains optimal cardiac output and hemodynamic stability:   Monitor blood pressure and heart rate   Monitor urine output and notify Licensed Independent Practitioner for values outside of normal range   Assess for signs of decreased cardiac output   Administer fluid and/or volume expanders as ordered   Administer vasoactive medications as ordered  Note: Bedside cardiac monitor in place. VS observed per policy and procedure.

## 2025-03-20 NOTE — PROGRESS NOTES
Progress Note    Admit Date:  1/30/2025    Interval history    Admitted for flu, copd exacerbation, course complicated with development of sbo, resp failure needing vent support     Tolerated pressure support 5/5 for 6 hours yesterday.  Plans for spontaneous breathing trial and possible extubation today.  On Precedex.    3/5-extubated on 3/4/2025.  Placed on BiPAP.  Has been intermittently confused.  Was on Precedex.  Has had some intermittent agitation.  When I saw the patient his eyes are open.   at bedside.  He has a good cough.    3/6- more calm then yesterday. On Bipap. Failed speech eval but they will see him again. Responding nicely to verbal commands,    3/7- more calm having thick secretions. ST has recommended MBS. He is NPO. Used Bipap last night. BP elevated today.    3/9- MR singer seen awake, but confused, restless was given seroquel for agitation. Confused and not directable only for few minutes and gets restless, throwing stuff  Was on precedex overnight but now off    3/10  Continues to be disoriented   Recd haldol yesterday  Now in restraints  Used BiPAP at night   Now on 5 L     3/11  Disoriented   Off restraints now   biPAP for 4-5 hours last night   On 5 L now     3/12   Continues to be confused   On BiPAP at night   5 L now     3/13  On BiPAP  Restless,disoriented     3/14   On BIPAP all night  Now on 4 L   Confused     3/15   On Bipap at night  Now on 4 L     3/16   Bipap at night   Now on 4 L     3/17- intermittently agitated. Sitter at bedside. A little more calm this am and answering some questions when I talked to him.     3/18-more calm this morning but may be just waking up from his sleep.  Speech saw the patient did not regain swallow.  Recommended meds to be crushed and given.  Family considering alternative modes of feeding-PEG tube.  GI consulted.  Psychiatry has seen patient.      3/19-he is more calm today but has.  The confusion and agitation.  Family has agreed  oral replacement, 40 mEq, PRN  prochlorperazine, 10 mg, Q8H PRN  albuterol, 2.5 mg, Q4H PRN  carboxymethylcellulose PF, 1 drop, PRN  sodium chloride flush, 5-40 mL, PRN  sodium chloride, , PRN  diatrizoate meglumine-sodium, 12 mL, ONCE PRN  guaiFENesin, 200 mg, Q4H PRN  benzocaine, , 4x Daily PRN  phenol, 1 spray, Q2H PRN  sodium chloride, , PRN  acetaminophen, 650 mg, Q6H PRN   Or  acetaminophen, 650 mg, Q6H PRN  dextrose bolus, 125 mL, PRN   Or  dextrose bolus, 250 mL, PRN  glucagon (rDNA), 1 mg, PRN  dextrose, , Continuous PRN  potassium chloride, 10 mEq, PRN  magnesium sulfate, 2,000 mg, PRN  glucose, 4 tablet, PRN          Data:  CBC:   Recent Labs     03/19/25  0426 03/20/25  0434   WBC 8.4 8.4   HGB 9.8* 10.8*   HCT 30.4* 34.4*   MCV 87.4 87.9    363     BMP:   Recent Labs     03/18/25  0436 03/19/25  0426 03/20/25  0434   * 134* 139   K 3.8 3.6 3.7   CL 94* 93* 98*   CO2 33* 33* 33*   BUN 13 12 11   CREATININE 0.6* 0.6* 0.7*     LIVER PROFILE:   No results for input(s): \"AST\", \"ALT\", \"LIPASE\", \"AMYLASE\", \"BILIDIR\", \"BILITOT\", \"ALKPHOS\" in the last 72 hours.    Invalid input(s): \"ALB\"    PT/INR:   Recent Labs     03/19/25  0455   PROTIME 13.7   INR 1.03       CULTURES  Results       ** No results found for the last 336 hours. **               RADIOLOGY  US ABDOMEN LIMITED   Preliminary Result   No significant ascites.      Cirrhotic morphology of the visualized liver.         FL MODIFIED BARIUM SWALLOW W VIDEO   Final Result   1. Positive silent tracheal aspiration of thin liquids, especially when   swallowing from a straw.   2. Mild-to-moderate laryngeal penetration without tracheal aspiration of   nectar thickened liquids.   3. No evidence of laryngeal penetration or tracheal aspiration of honey   thickened liquids or purees.   Please see separate speech pathology report for full discussion of findings   and recommendations.         XR ABDOMEN FOR NG/OG/NE TUBE PLACEMENT   Final Result

## 2025-03-20 NOTE — CARE COORDINATION
Spoke with Johan/STEPHANIE. Discussed requirements to start precert:    Attempt new OT/PT recommendations  If patient is not willing to work with OT/PT will start precert for PEG tube  If unable to skill for PEG tube will take under Medicaid.    *Xifaxan cost is $3500 per month but will not be a problem. If unable to cost out the medication - Medicaid will  the cost.    Per discussion with Lobo the patient is not on the schedule for PEG tube.  May not happen today.    Shamika will attempt to skill patient with therapy if patient will agree to work with therapy.    Spoke with Lobo who will have patient work with OT/PT.    OT/PT recs are in for SNF.  LVM for Shamika req call back.    CC can start precert once peg is placed.

## 2025-03-20 NOTE — PROGRESS NOTES
03/19/25 2229   NIV Type   NIV Started/Stopped (S)  On   Equipment Type v60   Mode Bilevel   Mask Type Full face mask   Mask Size Extra large   Assessment   Pulse 77   Respirations 14   SpO2 98 %   Settings/Measurements   IPAP 15 cmH20   CPAP/EPAP 5 cmH2O   Vt (Measured) 641 mL   Rate Ordered 18   FiO2  40 %   I Time/ I Time % 0.9 s   Minute Volume (L/min) 17 Liters   Mask Leak (lpm) 33 lpm   Patient's Home Machine No   Alarm Settings   Alarms On Y   Low Pressure (cmH2O) 5 cmH2O   High Pressure (cmH2O) 35 cmH2O   Delay Alarm 20 sec(s)   RR Low (bpm) 18   RR High (bpm) 40 br/min

## 2025-03-20 NOTE — PROGRESS NOTES
Spoke with Trinh Story NP for [facility]. Unable to complete the PEG tube today. Will obtain PEG on 3/21. NPO at midnight. Lovenox may be given today. Hold Lovenox tomorrow AM. Electronically signed by Leobardo Majano RN on 3/20/2025 at 1:42 PM

## 2025-03-20 NOTE — PLAN OF CARE
Problem: Chronic Conditions and Co-morbidities  Goal: Patient's chronic conditions and co-morbidity symptoms are monitored and maintained or improved  Outcome: Progressing     Problem: Discharge Planning  Goal: Discharge to home or other facility with appropriate resources  Outcome: Progressing     Problem: Safety - Adult  Goal: Free from fall injury  Outcome: Progressing     Problem: Pain  Goal: Verbalizes/displays adequate comfort level or baseline comfort level  Outcome: Progressing     Problem: Respiratory - Adult  Goal: Achieves optimal ventilation and oxygenation  3/20/2025 1638 by Nevaeh Jackman RN  Outcome: Progressing  3/20/2025 1141 by Leobardo Majano RN  Outcome: Progressing  Flowsheets (Taken 3/18/2025 2000 by Manolo Linn, RN)  Achieves optimal ventilation and oxygenation: Assess for changes in respiratory status  Note: HOB 30 degrees, Respirations easy and even. Speech is clear. Alert and oriented times 4. Family at bedside.      Problem: Cardiovascular - Adult  Goal: Maintains optimal cardiac output and hemodynamic stability  3/20/2025 1638 by Nevaeh Jackman RN  Outcome: Progressing  3/20/2025 1141 by Leobardo Majano RN  Outcome: Progressing  Flowsheets (Taken 3/16/2025 2000 by Viki Tran RN)  Maintains optimal cardiac output and hemodynamic stability:   Monitor blood pressure and heart rate   Monitor urine output and notify Licensed Independent Practitioner for values outside of normal range   Assess for signs of decreased cardiac output   Administer fluid and/or volume expanders as ordered   Administer vasoactive medications as ordered  Note: Bedside cardiac monitor in place. VS observed per policy and procedure.   Goal: Absence of cardiac dysrhythmias or at baseline  Outcome: Progressing     Problem: Skin/Tissue Integrity - Adult  Goal: Skin integrity remains intact  Description: 1.  Monitor for areas of redness and/or skin breakdown  2.  Assess vascular access

## 2025-03-20 NOTE — PROGRESS NOTES
Speech Language Pathology  Attempt Note     Name: Sandor Early  : 1955  Medical Diagnosis: Hypomagnesemia [E83.42]  Influenza A [J10.1]  COPD exacerbation (HCC) [J44.1]  Respiratory failure with hypoxia (HCC) [J96.91]  Alcohol use disorder [F10.90]  Acute on chronic respiratory failure with hypoxia and hypercapnia (HCC) [J96.21, J96.22]  Acute respiratory failure (HCC) [J96.00]      SLP attempted to see pt for dysphagia tx. Treatment unable to be completed due to pt NPO for PEG placement. RN uncertain of time. SLP to re-attempt as pt's condition and schedule allows. RN aware. No charges.    Thank you,    Willow Echevarria M.A. CCC-SLP   Speech-Language Pathologist

## 2025-03-21 ENCOUNTER — APPOINTMENT (OUTPATIENT)
Dept: GENERAL RADIOLOGY | Age: 70
DRG: 207 | End: 2025-03-21
Payer: MEDICARE

## 2025-03-21 ENCOUNTER — ANESTHESIA (OUTPATIENT)
Dept: ENDOSCOPY | Age: 70
End: 2025-03-21
Payer: MEDICARE

## 2025-03-21 LAB
ANION GAP SERPL CALCULATED.3IONS-SCNC: 11 MMOL/L (ref 3–16)
BASOPHILS # BLD: 0.1 K/UL (ref 0–0.2)
BASOPHILS NFR BLD: 0.7 %
BUN SERPL-MCNC: 13 MG/DL (ref 7–20)
CALCIUM SERPL-MCNC: 9.7 MG/DL (ref 8.3–10.6)
CHLORIDE SERPL-SCNC: 96 MMOL/L (ref 99–110)
CO2 SERPL-SCNC: 28 MMOL/L (ref 21–32)
CREAT SERPL-MCNC: 0.7 MG/DL (ref 0.8–1.3)
DEPRECATED RDW RBC AUTO: 18.4 % (ref 12.4–15.4)
EOSINOPHIL # BLD: 0.4 K/UL (ref 0–0.6)
EOSINOPHIL NFR BLD: 5.2 %
GFR SERPLBLD CREATININE-BSD FMLA CKD-EPI: >90 ML/MIN/{1.73_M2}
GLUCOSE BLD-MCNC: 104 MG/DL (ref 70–99)
GLUCOSE BLD-MCNC: 106 MG/DL (ref 70–99)
GLUCOSE BLD-MCNC: 106 MG/DL (ref 70–99)
GLUCOSE BLD-MCNC: 115 MG/DL (ref 70–99)
GLUCOSE BLD-MCNC: 93 MG/DL (ref 70–99)
GLUCOSE SERPL-MCNC: 96 MG/DL (ref 70–99)
HCT VFR BLD AUTO: 35.1 % (ref 40.5–52.5)
HGB BLD-MCNC: 11.1 G/DL (ref 13.5–17.5)
LYMPHOCYTES # BLD: 2.3 K/UL (ref 1–5.1)
LYMPHOCYTES NFR BLD: 28.7 %
MCH RBC QN AUTO: 27.5 PG (ref 26–34)
MCHC RBC AUTO-ENTMCNC: 31.5 G/DL (ref 31–36)
MCV RBC AUTO: 87.3 FL (ref 80–100)
MONOCYTES # BLD: 0.9 K/UL (ref 0–1.3)
MONOCYTES NFR BLD: 11.5 %
NEUTROPHILS # BLD: 4.3 K/UL (ref 1.7–7.7)
NEUTROPHILS NFR BLD: 53.9 %
PERFORMED ON: ABNORMAL
PERFORMED ON: NORMAL
PLATELET # BLD AUTO: 370 K/UL (ref 135–450)
PMV BLD AUTO: 7.4 FL (ref 5–10.5)
POTASSIUM SERPL-SCNC: 3.1 MMOL/L (ref 3.5–5.1)
RBC # BLD AUTO: 4.02 M/UL (ref 4.2–5.9)
SODIUM SERPL-SCNC: 135 MMOL/L (ref 136–145)
WBC # BLD AUTO: 7.9 K/UL (ref 4–11)

## 2025-03-21 PROCEDURE — 6370000000 HC RX 637 (ALT 250 FOR IP): Performed by: INTERNAL MEDICINE

## 2025-03-21 PROCEDURE — 94010 BREATHING CAPACITY TEST: CPT

## 2025-03-21 PROCEDURE — 2500000003 HC RX 250 WO HCPCS: Performed by: INTERNAL MEDICINE

## 2025-03-21 PROCEDURE — 80048 BASIC METABOLIC PNL TOTAL CA: CPT

## 2025-03-21 PROCEDURE — 2500000003 HC RX 250 WO HCPCS: Performed by: ANESTHESIOLOGY

## 2025-03-21 PROCEDURE — 2580000003 HC RX 258: Performed by: INTERNAL MEDICINE

## 2025-03-21 PROCEDURE — 2709999900 HC NON-CHARGEABLE SUPPLY: Performed by: INTERNAL MEDICINE

## 2025-03-21 PROCEDURE — 36415 COLL VENOUS BLD VENIPUNCTURE: CPT

## 2025-03-21 PROCEDURE — 85025 COMPLETE CBC W/AUTO DIFF WBC: CPT

## 2025-03-21 PROCEDURE — 94640 AIRWAY INHALATION TREATMENT: CPT

## 2025-03-21 PROCEDURE — 6370000000 HC RX 637 (ALT 250 FOR IP): Performed by: ANESTHESIOLOGY

## 2025-03-21 PROCEDURE — 94660 CPAP INITIATION&MGMT: CPT

## 2025-03-21 PROCEDURE — 94669 MECHANICAL CHEST WALL OSCILL: CPT

## 2025-03-21 PROCEDURE — 6360000002 HC RX W HCPCS: Performed by: INTERNAL MEDICINE

## 2025-03-21 PROCEDURE — 99233 SBSQ HOSP IP/OBS HIGH 50: CPT | Performed by: INTERNAL MEDICINE

## 2025-03-21 PROCEDURE — 3700000001 HC ADD 15 MINUTES (ANESTHESIA): Performed by: INTERNAL MEDICINE

## 2025-03-21 PROCEDURE — 2500000003 HC RX 250 WO HCPCS: Performed by: STUDENT IN AN ORGANIZED HEALTH CARE EDUCATION/TRAINING PROGRAM

## 2025-03-21 PROCEDURE — 2580000003 HC RX 258: Performed by: ANESTHESIOLOGY

## 2025-03-21 PROCEDURE — 6360000002 HC RX W HCPCS: Performed by: STUDENT IN AN ORGANIZED HEALTH CARE EDUCATION/TRAINING PROGRAM

## 2025-03-21 PROCEDURE — 99232 SBSQ HOSP IP/OBS MODERATE 35: CPT | Performed by: INTERNAL MEDICINE

## 2025-03-21 PROCEDURE — 3700000000 HC ANESTHESIA ATTENDED CARE: Performed by: INTERNAL MEDICINE

## 2025-03-21 PROCEDURE — 71045 X-RAY EXAM CHEST 1 VIEW: CPT

## 2025-03-21 PROCEDURE — 3609013300 HC EGD TUBE PLACEMENT: Performed by: INTERNAL MEDICINE

## 2025-03-21 PROCEDURE — 2060000000 HC ICU INTERMEDIATE R&B

## 2025-03-21 PROCEDURE — 0DH63UZ INSERTION OF FEEDING DEVICE INTO STOMACH, PERCUTANEOUS APPROACH: ICD-10-PCS | Performed by: INTERNAL MEDICINE

## 2025-03-21 PROCEDURE — 2700000000 HC OXYGEN THERAPY PER DAY

## 2025-03-21 PROCEDURE — 94761 N-INVAS EAR/PLS OXIMETRY MLT: CPT

## 2025-03-21 RX ORDER — OXYCODONE HYDROCHLORIDE 5 MG/1
5 TABLET ORAL PRN
Status: DISCONTINUED | OUTPATIENT
Start: 2025-03-21 | End: 2025-03-21 | Stop reason: HOSPADM

## 2025-03-21 RX ORDER — IPRATROPIUM BROMIDE AND ALBUTEROL SULFATE 2.5; .5 MG/3ML; MG/3ML
1 SOLUTION RESPIRATORY (INHALATION) ONCE
Status: COMPLETED | OUTPATIENT
Start: 2025-03-21 | End: 2025-03-21

## 2025-03-21 RX ORDER — ENOXAPARIN SODIUM 100 MG/ML
40 INJECTION SUBCUTANEOUS ONCE
Status: COMPLETED | OUTPATIENT
Start: 2025-03-21 | End: 2025-03-21

## 2025-03-21 RX ORDER — ONDANSETRON 2 MG/ML
4 INJECTION INTRAMUSCULAR; INTRAVENOUS EVERY 30 MIN PRN
Status: DISCONTINUED | OUTPATIENT
Start: 2025-03-21 | End: 2025-03-21 | Stop reason: HOSPADM

## 2025-03-21 RX ORDER — GLYCOPYRROLATE 0.2 MG/ML
INJECTION INTRAMUSCULAR; INTRAVENOUS
Status: DISCONTINUED | OUTPATIENT
Start: 2025-03-21 | End: 2025-03-21 | Stop reason: SDUPTHER

## 2025-03-21 RX ORDER — OXYCODONE HYDROCHLORIDE 5 MG/1
10 TABLET ORAL PRN
Status: DISCONTINUED | OUTPATIENT
Start: 2025-03-21 | End: 2025-03-21 | Stop reason: HOSPADM

## 2025-03-21 RX ORDER — PROPOFOL 10 MG/ML
INJECTION, EMULSION INTRAVENOUS
Status: DISCONTINUED | OUTPATIENT
Start: 2025-03-21 | End: 2025-03-21 | Stop reason: SDUPTHER

## 2025-03-21 RX ORDER — NALOXONE HYDROCHLORIDE 0.4 MG/ML
INJECTION, SOLUTION INTRAMUSCULAR; INTRAVENOUS; SUBCUTANEOUS PRN
Status: DISCONTINUED | OUTPATIENT
Start: 2025-03-21 | End: 2025-03-21 | Stop reason: HOSPADM

## 2025-03-21 RX ORDER — SODIUM CHLORIDE 0.9 % (FLUSH) 0.9 %
5-40 SYRINGE (ML) INJECTION PRN
Status: DISCONTINUED | OUTPATIENT
Start: 2025-03-21 | End: 2025-03-21 | Stop reason: HOSPADM

## 2025-03-21 RX ORDER — ALBUTEROL SULFATE 0.83 MG/ML
2.5 SOLUTION RESPIRATORY (INHALATION) ONCE
Status: DISCONTINUED | OUTPATIENT
Start: 2025-03-21 | End: 2025-03-21 | Stop reason: HOSPADM

## 2025-03-21 RX ORDER — SODIUM CHLORIDE 0.9 % (FLUSH) 0.9 %
5-40 SYRINGE (ML) INJECTION EVERY 12 HOURS SCHEDULED
Status: DISCONTINUED | OUTPATIENT
Start: 2025-03-21 | End: 2025-03-21 | Stop reason: HOSPADM

## 2025-03-21 RX ORDER — SODIUM CHLORIDE 9 MG/ML
INJECTION, SOLUTION INTRAVENOUS PRN
Status: DISCONTINUED | OUTPATIENT
Start: 2025-03-21 | End: 2025-03-21 | Stop reason: HOSPADM

## 2025-03-21 RX ORDER — KETAMINE HYDROCHLORIDE 100 MG/ML
INJECTION, SOLUTION INTRAMUSCULAR; INTRAVENOUS
Status: DISCONTINUED | OUTPATIENT
Start: 2025-03-21 | End: 2025-03-21 | Stop reason: SDUPTHER

## 2025-03-21 RX ORDER — FAMOTIDINE 10 MG/ML
INJECTION, SOLUTION INTRAVENOUS
Status: DISCONTINUED | OUTPATIENT
Start: 2025-03-21 | End: 2025-03-21 | Stop reason: SDUPTHER

## 2025-03-21 RX ORDER — LIDOCAINE HYDROCHLORIDE 20 MG/ML
INJECTION, SOLUTION INFILTRATION; PERINEURAL
Status: DISCONTINUED | OUTPATIENT
Start: 2025-03-21 | End: 2025-03-21 | Stop reason: SDUPTHER

## 2025-03-21 RX ADMIN — PHENOL 1 SPRAY: 1.5 LIQUID ORAL at 01:24

## 2025-03-21 RX ADMIN — Medication 4 ML: at 11:09

## 2025-03-21 RX ADMIN — METOPROLOL TARTRATE 50 MG: 50 TABLET, FILM COATED ORAL at 11:25

## 2025-03-21 RX ADMIN — POLYETHYLENE GLYCOL (3350) 17 G: 17 POWDER, FOR SOLUTION ORAL at 11:28

## 2025-03-21 RX ADMIN — DILTIAZEM HYDROCHLORIDE 60 MG: 60 TABLET ORAL at 20:26

## 2025-03-21 RX ADMIN — PROPOFOL 50 MG: 10 INJECTION, EMULSION INTRAVENOUS at 08:20

## 2025-03-21 RX ADMIN — FAMOTIDINE 20 MG: 10 INJECTION, SOLUTION INTRAVENOUS at 07:46

## 2025-03-21 RX ADMIN — SODIUM CHLORIDE, PRESERVATIVE FREE 10 ML: 5 INJECTION INTRAVENOUS at 11:45

## 2025-03-21 RX ADMIN — SODIUM CHLORIDE, PRESERVATIVE FREE 10 ML: 5 INJECTION INTRAVENOUS at 20:27

## 2025-03-21 RX ADMIN — KETAMINE HYDROCHLORIDE 20 MG: 100 INJECTION, SOLUTION, CONCENTRATE INTRAMUSCULAR; INTRAVENOUS at 08:13

## 2025-03-21 RX ADMIN — PROPOFOL 50 MG: 10 INJECTION, EMULSION INTRAVENOUS at 08:23

## 2025-03-21 RX ADMIN — RIFAXIMIN 550 MG: 550 TABLET ORAL at 20:26

## 2025-03-21 RX ADMIN — SODIUM CHLORIDE 2000 MG: 9 INJECTION, SOLUTION INTRAVENOUS at 08:13

## 2025-03-21 RX ADMIN — GABAPENTIN 200 MG: 250 SOLUTION ORAL at 14:15

## 2025-03-21 RX ADMIN — ALBUTEROL SULFATE 2.5 MG: 2.5 SOLUTION RESPIRATORY (INHALATION) at 15:37

## 2025-03-21 RX ADMIN — PANTOPRAZOLE SODIUM 40 MG: 40 INJECTION, POWDER, LYOPHILIZED, FOR SOLUTION INTRAVENOUS at 11:28

## 2025-03-21 RX ADMIN — ALBUTEROL SULFATE 2.5 MG: 2.5 SOLUTION RESPIRATORY (INHALATION) at 11:09

## 2025-03-21 RX ADMIN — ATORVASTATIN CALCIUM 40 MG: 40 TABLET, FILM COATED ORAL at 11:25

## 2025-03-21 RX ADMIN — LIDOCAINE HYDROCHLORIDE 100 MG: 20 INJECTION, SOLUTION INFILTRATION; PERINEURAL at 08:13

## 2025-03-21 RX ADMIN — SODIUM CHLORIDE: 0.9 INJECTION, SOLUTION INTRAVENOUS at 06:06

## 2025-03-21 RX ADMIN — ENOXAPARIN SODIUM 40 MG: 100 INJECTION SUBCUTANEOUS at 11:33

## 2025-03-21 RX ADMIN — Medication 15 ML: at 11:33

## 2025-03-21 RX ADMIN — GABAPENTIN 200 MG: 250 SOLUTION ORAL at 11:33

## 2025-03-21 RX ADMIN — LACTULOSE 30 G: 10 SOLUTION ORAL at 11:28

## 2025-03-21 RX ADMIN — PROPOFOL 50 MG: 10 INJECTION, EMULSION INTRAVENOUS at 08:13

## 2025-03-21 RX ADMIN — QUETIAPINE FUMARATE 50 MG: 25 TABLET ORAL at 20:26

## 2025-03-21 RX ADMIN — RIFAXIMIN 550 MG: 550 TABLET ORAL at 11:28

## 2025-03-21 RX ADMIN — GLYCOPYRROLATE 0.2 MG: 0.2 INJECTION, SOLUTION INTRAMUSCULAR; INTRAVENOUS at 08:13

## 2025-03-21 RX ADMIN — GABAPENTIN 200 MG: 250 SOLUTION ORAL at 20:26

## 2025-03-21 RX ADMIN — OXYCODONE 5 MG: 5 TABLET ORAL at 18:01

## 2025-03-21 RX ADMIN — Medication 4 ML: at 19:49

## 2025-03-21 RX ADMIN — ALLOPURINOL 300 MG: 300 TABLET ORAL at 11:28

## 2025-03-21 RX ADMIN — METOPROLOL TARTRATE 50 MG: 50 TABLET, FILM COATED ORAL at 20:26

## 2025-03-21 RX ADMIN — IPRATROPIUM BROMIDE AND ALBUTEROL SULFATE 1 DOSE: .5; 2.5 SOLUTION RESPIRATORY (INHALATION) at 07:45

## 2025-03-21 RX ADMIN — THIAMINE HYDROCHLORIDE 100 MG: 100 INJECTION, SOLUTION INTRAMUSCULAR; INTRAVENOUS at 11:28

## 2025-03-21 RX ADMIN — ALBUTEROL SULFATE 2.5 MG: 2.5 SOLUTION RESPIRATORY (INHALATION) at 19:49

## 2025-03-21 RX ADMIN — FAMOTIDINE 20 MG: 10 INJECTION, SOLUTION INTRAVENOUS at 08:13

## 2025-03-21 RX ADMIN — DILTIAZEM HYDROCHLORIDE 60 MG: 60 TABLET ORAL at 14:15

## 2025-03-21 RX ADMIN — PROPOFOL 50 MG: 10 INJECTION, EMULSION INTRAVENOUS at 08:16

## 2025-03-21 RX ADMIN — POTASSIUM CHLORIDE 10 MEQ: 7.46 INJECTION, SOLUTION INTRAVENOUS at 06:33

## 2025-03-21 RX ADMIN — POTASSIUM BICARBONATE 40 MEQ: 782 TABLET, EFFERVESCENT ORAL at 11:33

## 2025-03-21 RX ADMIN — ZONISAMIDE 100 MG: 25 CAPSULE ORAL at 20:35

## 2025-03-21 ASSESSMENT — COPD QUESTIONNAIRES
CAT_TOTALSCORE: 31
QUESTION2_CHESTPHLEGM: 3
QUESTION1_COUGHFREQUENCY: 4
QUESTION5_HOMEACTIVITIES: 5
QUESTION6_LEAVINGHOUSE: 5
QUESTION4_WALKINCLINE: 5
QUESTION7_SLEEPQUALITY: 2
QUESTION3_CHESTTIGHTNESS: 3
CAT_SEVERITY: SEVERE
QUESTION8_ENERGYLEVEL: 4

## 2025-03-21 ASSESSMENT — PAIN SCALES - GENERAL
PAINLEVEL_OUTOF10: 0
PAINLEVEL_OUTOF10: 4
PAINLEVEL_OUTOF10: 2
PAINLEVEL_OUTOF10: 5
PAINLEVEL_OUTOF10: 6

## 2025-03-21 ASSESSMENT — PAIN DESCRIPTION - ORIENTATION: ORIENTATION: LEFT

## 2025-03-21 ASSESSMENT — PAIN DESCRIPTION - LOCATION
LOCATION: ABDOMEN
LOCATION: THROAT

## 2025-03-21 ASSESSMENT — PAIN - FUNCTIONAL ASSESSMENT
PAIN_FUNCTIONAL_ASSESSMENT: ACTIVITIES ARE NOT PREVENTED
PAIN_FUNCTIONAL_ASSESSMENT: NONE - DENIES PAIN
PAIN_FUNCTIONAL_ASSESSMENT: PREVENTS OR INTERFERES WITH MANY ACTIVE NOT PASSIVE ACTIVITIES

## 2025-03-21 ASSESSMENT — PAIN SCALES - WONG BAKER: WONGBAKER_NUMERICALRESPONSE: HURTS A LITTLE BIT

## 2025-03-21 ASSESSMENT — PAIN DESCRIPTION - DESCRIPTORS
DESCRIPTORS: SHARP
DESCRIPTORS: SORE

## 2025-03-21 NOTE — CARE COORDINATION
Spoke with Ruthie/STEPHANIE. Ruthie states patient was aggressive with staff and raised fist. Also reports patient was in restraints.Per Ruthie can start precert later today when she gets back to the office. Patient will need to be our of restraints for 24 hours.    [facility] needs to start the precert as they are attempting to skill patient and will need to cost out medication. If unable to skill will bring in with Medicaid - which will cover the cost of the medication.  Patient can DC with Xifaxan regardless if he is skilled or goes under Medicaid.    Shamika advised CM on 3/20/25  that the patient should be able to dc to [facility] on 3/21/25. Ruthie is advising will need to start precert and will need a denial from [employer] prior to using Medicaid. Ruthie anticipates this will not happen until Monday.     ARIEL read through chart. Noted in the night patient awoke confused and did raise fist and voice however the RN deescalated the situation and was able to reorient  the patient. No restraints noted in the chart.    Spoke to Bruno/DARNELL. Bruno spoke to Lobo RN. The patient was not placed in restraints. The patient has been fine with behavior all day.

## 2025-03-21 NOTE — PROGRESS NOTES
03/21/25 0300   NIV Type   NIV Started/Stopped On   Equipment Type v60   Mode Bilevel   Mask Type Full face mask   Mask Size Extra large   Assessment   Pulse 84   Respirations 23   SpO2 100 %   Settings/Measurements   IPAP 15 cmH20   CPAP/EPAP 5 cmH2O   Vt (Measured) 410 mL   Rate Ordered 18   FiO2  40 %   I Time/ I Time % 0.9 s   Minute Volume (L/min) 8.5 Liters   Mask Leak (lpm) 56 lpm   Patient's Home Machine No

## 2025-03-21 NOTE — PROGRESS NOTES
03/21/25 1134   NIV Type   Suction Setup and Functional Yes   NIV Started/Stopped On   Equipment Type V60   Mode Bilevel   Mask Type Full face mask   Mask Size Extra large   Assessment   Pulse (!) 111   Respirations 16   SpO2 100 %   Level of Consciousness 0   Comfort Level Good   Using Accessory Muscles No   Mask Compliance Good   Skin Assessment Clean, dry, & intact   Skin Protection for O2 Device Yes   Orientation Middle   Location Nose   Intervention(s) Skin Barrier   Breath Sounds   Breath Sounds Bilateral Diminished   Right Upper Lobe Diminished   Right Middle Lobe Diminished   Right Lower Lobe Diminished   Left Upper Lobe Diminished   Left Lower Lobe Diminished   Settings/Measurements   IPAP 15 cmH20   CPAP/EPAP 5 cmH2O   Vt (Measured) 750 mL   Rate Ordered 18   Insp Rise Time (%) 18 %   FiO2  40 %   I Time/ I Time % 0.9 s   Minute Volume (L/min) 13.2 Liters   Mask Leak (lpm) 73 lpm   Patient's Home Machine No   Alarm Settings   Alarms On Y   Low Pressure (cmH2O) 5 cmH2O   High Pressure (cmH2O) 35 cmH2O   Delay Alarm 20 sec(s)   RR Low (bpm) 18   RR High (bpm) 40 br/min   Oxygen Therapy/Pulse Ox   O2 Therapy Oxygen   O2 Device PAP (positive airway pressure)   Pulse Oximeter Device Mode Continuous   Pulse Oximeter Device Location Finger

## 2025-03-21 NOTE — ANESTHESIA POSTPROCEDURE EVALUATION
Department of Anesthesiology  Postprocedure Note    Patient: Sandor Early  MRN: [id number]  YOB: 1955  Date of evaluation: 3/21/2025    Procedure Summary       Date: 03/21/25 Room / Location: [place] [id number] / [facility]    Anesthesia Start: 0807 Anesthesia Stop: 0830    Procedure: EGD/PEG W/ANES. Diagnosis:       Esophageal dysphagia      (Esophageal dysphagia [R13.19])    Surgeons: Landon Hightower DO Responsible Provider: Jerson Ordonez MD    Anesthesia Type: MAC ASA Status: 3            Anesthesia Type: No value filed.    Radha Phase I: Radha Score: 8    Radha Phase II:      Anesthesia Post Evaluation    Patient location during evaluation: PACU  Level of consciousness: awake  Airway patency: patent  Nausea & Vomiting: no vomiting  Cardiovascular status: blood pressure returned to baseline  Respiratory status: acceptable  Hydration status: stable  Pain management: adequate    No notable events documented.

## 2025-03-21 NOTE — PROGRESS NOTES
03/20/25 2342   NIV Type   NIV Started/Stopped On   Equipment Type v60   Mode Bilevel   Mask Type Full face mask   Mask Size Extra large   Assessment   Pulse 75   Respirations 18   SpO2 97 %   Settings/Measurements   IPAP 15 cmH20   CPAP/EPAP 5 cmH2O   Vt (Measured) 433 mL   Rate Ordered 18   FiO2  40 %   I Time/ I Time % 0.9 s   Minute Volume (L/min) 9 Liters   Mask Leak (lpm) 55 lpm   Patient's Home Machine No   Alarm Settings   Alarms On Y   Low Pressure (cmH2O) 5 cmH2O   High Pressure (cmH2O) 35 cmH2O   Delay Alarm 20 sec(s)   RR Low (bpm) 18   RR High (bpm) 40 br/min

## 2025-03-21 NOTE — CARE COORDINATION
Met with patient at bedside. Asked patient who he would like to be his primary decision maker. The patient stated his daughter Catia. Chart updated. Brother Ed at bedside.

## 2025-03-21 NOTE — PROGRESS NOTES
Progress Note    Admit Date:  1/30/2025    Interval history    Admitted for flu, copd exacerbation, course complicated with development of sbo, resp failure needing vent support     Tolerated pressure support 5/5 for 6 hours yesterday.  Plans for spontaneous breathing trial and possible extubation today.  On Precedex.    3/5-extubated on 3/4/2025.  Placed on BiPAP.  Has been intermittently confused.  Was on Precedex.  Has had some intermittent agitation.  When I saw the patient his eyes are open.   at bedside.  He has a good cough.    3/6- more calm then yesterday. On Bipap. Failed speech eval but they will see him again. Responding nicely to verbal commands,    3/7- more calm having thick secretions. ST has recommended MBS. He is NPO. Used Bipap last night. BP elevated today.    3/9- MR singer seen awake, but confused, restless was given seroquel for agitation. Confused and not directable only for few minutes and gets restless, throwing stuff  Was on precedex overnight but now off    3/10  Continues to be disoriented   Recd haldol yesterday  Now in restraints  Used BiPAP at night   Now on 5 L     3/11  Disoriented   Off restraints now   biPAP for 4-5 hours last night   On 5 L now     3/12   Continues to be confused   On BiPAP at night   5 L now     3/13  On BiPAP  Restless,disoriented     3/14   On BIPAP all night  Now on 4 L   Confused     3/15   On Bipap at night  Now on 4 L     3/16   Bipap at night   Now on 4 L     3/17- intermittently agitated. Sitter at bedside. A little more calm this am and answering some questions when I talked to him.     3/18-more calm this morning but may be just waking up from his sleep.  Speech saw the patient did not regain swallow.  Recommended meds to be crushed and given.  Family considering alternative modes of feeding-PEG tube.  GI consulted.  Psychiatry has seen patient.      3/19-he is more calm today but has.  The confusion and agitation.  Family has agreed  small bowel dilation.  No free air.         CT HEAD WO CONTRAST   Final Result   No acute intracranial abnormality.      Minimal fluid in the left maxillary sinus and left mastoid air cells.         XR ABDOMEN (2 VIEWS)   Final Result   No significant change in appearance of a suspected distal small bowel   obstruction, without evidence of free air.         XR ABDOMEN (2 VIEWS)   Final Result   Dilated loops of small bowel appear unchanged from the prior study compatible   with partial small bowel obstruction.         XR ABDOMEN (2 VIEWS)   Final Result   1. Unchanged dilated loops of small bowel.         XR ABDOMEN FOR NG/OG/NE TUBE PLACEMENT   Final Result   NG tube in place, with tip within the mid to upper body of the stomach.         CT ABDOMEN PELVIS W IV CONTRAST Additional Contrast? None   Final Result   1. Dilated small bowel loops with air-fluid levels and small bowel   fecalization in the terminal ileum. Findings are concerning for ileus versus   low-grade small bowel obstruction.   2. Circumferential wall thickening and hyperenhancement in the terminal   ileum. This may be related to infectious or inflammatory etiology.   3. Rim enhancing tubular fluid collection in the region of cecum extending   into the terminal ileum measuring 2.8 x 0.6 cm. Pre ileal fluid collection   measures 1.9 x 1.7 cm.  The findings could represent post appendectomy   seroma, hematoma versus abscess.   4. Patchy consolidation in the lung bases with mucous plugging in the left   lower lobe bronchi. This may be related to aspiration.   5. Cirrhosis.   6. Aorto bi-iliac graft is patent.         XR ABDOMEN (KUB) (SINGLE AP VIEW)   Final Result   Worsening small bowel obstruction.         XR ABDOMEN (2 VIEWS)   Final Result   Gastric distension with a few mildly dilated loops of small bowel in the left   upper abdominal quadrant.  Findings could represent gastroenteritis or   partial small bowel obstruction.         XR CHEST

## 2025-03-21 NOTE — PROGRESS NOTES
03/21/25 1900   RT Protocol   History Pulmonary Disease 2   Respiratory pattern 2   Breath sounds 2   Cough 0   Indications for Bronchodilator Therapy Decreased or absent breath sounds   Bronchodilator Assessment Score 6     RT Inhaler-Nebulizer Bronchodilator Protocol Note    There is a bronchodilator order in the chart from a provider indicating to follow the RT Bronchodilator Protocol and there is an “Initiate RT Inhaler-Nebulizer Bronchodilator Protocol” order as well (see protocol at bottom of note).    CXR Findings:  XR CHEST PORTABLE  Result Date: 3/21/2025  No acute cardiopulmonary process.       The findings from the last RT Protocol Assessment were as follows:   History Pulmonary Disease: Chronic pulmonary disease  Respiratory Pattern: Dyspnea on exertion or RR 21-25 bpm  Breath Sounds: Slightly diminished and/or crackles  Cough: Strong, spontaneous, non-productive  Indication for Bronchodilator Therapy: Decreased or absent breath sounds  Bronchodilator Assessment Score: 6    Aerosolized bronchodilator medication orders have been revised according to the RT Inhaler-Nebulizer Bronchodilator Protocol below.    Respiratory Therapist to perform RT Therapy Protocol Assessment initially then follow the protocol.  Repeat RT Therapy Protocol Assessment PRN for score 0-3 or on second treatment, BID, and PRN for scores above 3.    No Indications - adjust the frequency to every 6 hours PRN wheezing or bronchospasm, if no treatments needed after 48 hours then discontinue using Per Protocol order mode.     If indication present, adjust the RT bronchodilator orders based on the Bronchodilator Assessment Score as indicated below.  Use Inhaler orders unless patient has one or more of the following: on home nebulizer, not able to hold breath for 10 seconds, is not alert and oriented, cannot activate and use MDI correctly, or respiratory rate 25 breaths per minute or more, then use the equivalent nebulizer order(s) with

## 2025-03-21 NOTE — PROGRESS NOTES
[facility]                            Critical care Progress Note :    CC: follow on RF post vent     Events of Last 24 hours:  Hypoxia after procedure   Now needed BiPAP 15/5 fr 6 hours   Secretions better   For peg tube   Some agitation at night   Speech will see again ,had modified ,plan for PEG tube by GI       IV:   sodium chloride Stopped (03/21/25 0830)    sodium chloride      dextrose         Vitals:  Blood pressure (!) 146/86, pulse (!) 101, temperature 97 °F (36.1 °C), temperature source Infrared, resp. rate 15, height 1.702 m (5' 7.01\"), weight 76.1 kg (167 lb 12.3 oz), SpO2 100%.  on 6 L    EXAM:  General: not in distress.    Eyes: No sclera icterus. No conjunctival injection.  ENT: No discharge.   Neck: Trachea midline. Has possible knot chest couild be cyst to folow OP ,pt aware   Resp: No accessory muscle use. Few crackles. No wheezing. Few rhonchi.   CV: Regular  rate. Regular rhythm. No mumur or rub. No edema.   GI: Non-tender. Non-distended.   Skin: Warm and dry. No rash on exposed extremities.  M/S: No cyanosis. No clubbing.   Neuro: + following commands.  Good cough.   Psych: lethargic today        Scheduled Meds:   QUEtiapine  50 mg Oral QHS    zonisamide  100 mg Oral QHS    thiamine  100 mg IntraVENous Daily    lactulose  30 g Oral Daily    sodium chloride (Inhalant)  4 mL Nebulization BID RT    rifAXIMin  550 mg Oral BID    Gabapentin  200 mg Per NG tube Q6H    DULoxetine  30 mg Oral Daily    cloNIDine  1 patch TransDERmal Weekly    CENTRUM/CERTA-KAYKAY with minerals oral  15 mL Oral Daily    dilTIAZem  60 mg Oral 3 times per day    metoprolol tartrate  50 mg Oral BID    polyethylene glycol  17 g Oral Daily    sodium chloride flush  5-40 mL IntraVENous 2 times per day    insulin lispro  0-16 Units SubCUTAneous Q4H    albuterol  2.5 mg Nebulization 4x Daily RT    pantoprazole (PROTONIX) 40 mg in sodium chloride (PF) 0.9 % 10 mL injection  40 mg

## 2025-03-21 NOTE — PROGRESS NOTES
Pt lethargic post op, sats dropping to low 80s on 8L HFNC. Placed on BIPAP at this time. SpO2 now 97% on bipap 15/5 40% fio2.

## 2025-03-21 NOTE — CARE COORDINATION
Precert started by [facility].  Advised Ruthie family just advised they want the patient to be closer to home. Brother Ed at bedside, Lobo RN, and patient. Ed stated the patient has been to [facility] and would like to go back. Patient in agreement.     Spoke with [facility] to ask if they can look at patient. Per [facility] the patient has been with them before. [facility] will review.    [facility] able to accept the patient.    CM LVM for Ruthie requesting precert be cancelled.     CM checked with [facility] representative who advised they do not have a precert for [facility]. [employer] is open through the weekend and will watch for the precert for [facility] and will take it to the  nurses for review.    [facility] aware.  Lobo RN aware  Patient and daughter Catia aware.     NOTE: patient to discharge to [facility]. Will need order for bipap (ACM has ordered) and will not dc on Xifaxan or the Clonidine patch. Will have a replacement for Xifaxan and and oral Clonidine. If precert comes back discharge at 16:00 on Saturday - 24 hours post start of using new peg tube.

## 2025-03-21 NOTE — PROGRESS NOTES
Spoke with Dr. Hightower. The patients PEG tube may be used 2 hours after placement for medications (1045 am) and feeding may be started 6 hours after placement (1445 pm). Electronically signed by Leobardo Majano RN on 3/21/2025 at 9:20 AM

## 2025-03-21 NOTE — ANESTHESIA PRE PROCEDURE
MD Candace 100 mL/hr at 03/21/25 0606 New Bag at 03/21/25 0606   • insulin lispro (HUMALOG,ADMELOG) injection vial 0-16 Units  0-16 Units SubCUTAneous Q4H Candace Mckeon MD   4 Units at 02/24/25 0830   • albuterol (PROVENTIL) (2.5 MG/3ML) 0.083% nebulizer solution 2.5 mg  2.5 mg Nebulization 4x Daily RT Candace Mckeon MD   2.5 mg at 03/20/25 1935   • diatrizoate meglumine-sodium (GASTROGRAFIN) 66-10 % solution 12 mL  12 mL Per NG tube ONCE PRN Candace Mckeon MD   12 mL at 02/13/25 0915   • pantoprazole (PROTONIX) 40 mg in sodium chloride (PF) 0.9 % 10 mL injection  40 mg IntraVENous Daily Candace Mckeon MD   40 mg at 03/20/25 0823   • guaiFENesin (ROBITUSSIN) 100 MG/5ML liquid 200 mg  200 mg Oral Q4H PRN Candace Mckeon MD   200 mg at 03/15/25 2203   • benzocaine (HURRICAINE) 20 % oral spray   Mouth/Throat 4x Daily PRN Candace Mckeon MD       • phenol 1.4 % mouth spray 1 spray  1 spray Mouth/Throat Q2H PRN Candace Mckeon MD   1 spray at 03/21/25 0124   • 0.9 % sodium chloride infusion   IntraVENous PRN Candace Mckeon MD       • [Held by provider] enoxaparin (LOVENOX) injection 40 mg  40 mg SubCUTAneous Daily Candace Mckeon MD   40 mg at 03/20/25 1329   • acetaminophen (TYLENOL) tablet 650 mg  650 mg Oral Q6H PRN Candace Mckeon MD   650 mg at 03/16/25 2027    Or   • acetaminophen (TYLENOL) suppository 650 mg  650 mg Rectal Q6H PRN Candace Mckeon MD       • dextrose bolus 10% 125 mL  125 mL IntraVENous PRN Candace Mckeon MD   Stopped at 02/09/25 2014    Or   • dextrose bolus 10% 250 mL  250 mL IntraVENous PRN Candace Mckeon MD       • glucagon injection 1 mg  1 mg SubCUTAneous PRN Candace Mckeon MD       • dextrose 10 % infusion   IntraVENous Continuous PRN Candace Mckeon MD       • potassium chloride 10 mEq/100 mL IVPB (Peripheral Line)  10 mEq IntraVENous PRN

## 2025-03-21 NOTE — PROGRESS NOTES
Comprehensive Nutrition Assessment    Type and Reason for Visit:  Reassess    Nutrition Recommendations/Plan:   Continue NPO status until patient is medically cleared to receive EN via newly placed PEG tube.   TF recommendations - ADULT TUBE FEEDING; PEG tube; Diabetic formula - Glucerna 1.5 with a goal rate of 60 ml/hr x 20 hours. Start with 20 ml/hr and increase by 20 ml every 4 hours, as tolerated by patient, until goal rate can be achieved and maintained. Water flushes, 60 ml every 4 hours for tube patency.   Monitor when TF is started (okay to start in the afternoon on 3/21/25, per GI guidance) + TF rate, intake, and tolerance with water flushes.   Monitor nutrition-related labs, bowel function (last documented BM was on 3/16/25), and weight trends.      Malnutrition Assessment:  Malnutrition Status:  Severe malnutrition (03/21/25 1057)    Context:  Acute Illness     Findings of the 6 clinical characteristics of malnutrition:  Energy Intake:  50% or less of estimated energy requirements for 5 or more days  Weight Loss:  Greater than 7.5% over 3 months (-23# or 12% weight loss since 1/31/25)     Body Fat Loss:  Moderate body fat loss Orbital   Muscle Mass Loss:  Moderate muscle mass loss Clavicles (pectoralis & deltoids), Calf (gastrocnemius), Hand (interosseous)  Fluid Accumulation:  No fluid accumulation Generalized (generalized +1 pitting edema)   Strength:  Not Performed    Nutrition Assessment:    patient is declining from a nutritional standpoint AEB patient was NPO and NG tube was removed for swallow evaluations prior to PEG tube placement this am; he is at risk for further compromise d/t swallowing difficulty, need for PEG tube and EN as sole source of nutrition at this time, and constipation; will continue NPO status and provide TF recommendations    Nutrition Related Findings:    patient is A & O x 3 today; he has not been able to pass his swallow evaluations or MBSS successfully and safely so PEG

## 2025-03-21 NOTE — PROGRESS NOTES
RN to room after hearing patient yelling and BiPAP alarming, upon  at bedside encouraging patient to not remove BiPAP and reminding patient that it is the middle of the night and that family are most likely sleeping (patient claiming family member coming to get them), patient verbally aggressive towards tech and then observed raising fist. RN deescalated situation, inquiring needs. When more calm, assessed orientation - patient still aware that he is at [facility] and that it is night time. RN educated BiPAP purpose and physician order. Discussed giving patient temporary break and attempting to wear it at a later time. Patient verbalizes agreement. BiPAP removed and 4L HFNC applied, patient then repositioned for comfort to patients satisfaction, call light in reach, bed in low locked position. No other needs at this time.

## 2025-03-21 NOTE — PLAN OF CARE
Problem: Chronic Conditions and Co-morbidities  Goal: Patient's chronic conditions and co-morbidity symptoms are monitored and maintained or improved  3/21/2025 0346 by Catia Rico RN  Outcome: Progressing  3/20/2025 1638 by Nevaeh Jackman RN  Outcome: Progressing     Problem: Discharge Planning  Goal: Discharge to home or other facility with appropriate resources  3/21/2025 0346 by Catia Rico RN  Outcome: Progressing  3/20/2025 1638 by Nevaeh Jackman RN  Outcome: Progressing     Problem: Safety - Adult  Goal: Free from fall injury  3/21/2025 0346 by Catia Rico RN  Outcome: Progressing  3/20/2025 1638 by Nevaeh Jackman RN  Outcome: Progressing     Problem: Pain  Goal: Verbalizes/displays adequate comfort level or baseline comfort level  3/21/2025 0346 by Catia Rico RN  Outcome: Progressing  3/20/2025 1638 by Nevaeh Jackman RN  Outcome: Progressing     Problem: Respiratory - Adult  Goal: Achieves optimal ventilation and oxygenation  3/21/2025 0346 by Catia Rico RN  Outcome: Progressing  3/20/2025 1638 by Nevaeh Jackman RN  Outcome: Progressing     Problem: Cardiovascular - Adult  Goal: Maintains optimal cardiac output and hemodynamic stability  3/21/2025 0346 by Catia Rico RN  Outcome: Progressing  3/20/2025 1638 by Nevaeh Jackman RN  Outcome: Progressing  Goal: Absence of cardiac dysrhythmias or at baseline  3/21/2025 0346 by Catia Rico RN  Outcome: Progressing  3/20/2025 1638 by Nevaeh Jackman RN  Outcome: Progressing     Problem: Skin/Tissue Integrity - Adult  Goal: Skin integrity remains intact  Description: 1.  Monitor for areas of redness and/or skin breakdown  2.  Assess vascular access sites hourly  3.  Every 4-6 hours minimum:  Change oxygen saturation probe site  4.  Every 4-6 hours:  If on nasal continuous positive airway pressure, respiratory therapy assess nares and determine need for appliance change or resting period  3/21/2025 0346 by Trisha

## 2025-03-21 NOTE — H&P
[facility] ENDOSCOPY  Outpatient Procedure H&P    Patient: Sandor Early MRN: [id number]     YOB: 1955  Age: 69 y.o.  Sex: male    Unit: [facility] ENDOSCOPY Room/Bed: ENDO/NONE Location: [facility]     Procedure: Procedure(s):  EGD/PEG W/ANES.    Indication:Dysphagia  Referring  Physician:        Brief history: Sandor Early is a 69 y.o. male with a PMH of recent ileitis with SBO, congestive heart failure, COPD, diabetes, hypertension and Whyte cirrhosis who presented on 1/30/2025 with hypoxia and shortness of breath. Admitted for flu, copd exacerbation, course complicated with development of SBO, respiratory failure needing vent support.     Nurses past medical history notes reviewed and agreed.   Medications reviewed.    Allergies: Lisinopril, Ace inhibitors, Influenza vaccines, Tiotropium bromide monohydrate, Vilanterol, and Diazepam     Allergies noted: Yes     Past Medical History:   Past Medical History:   Diagnosis Date    Bladder outlet obstruction 03/05/2017    Carpal tunnel syndrome of left wrist 04/02/2013    Cellulitis of right lower extremity 03/27/2023    Cellulitis of right upper extremity 03/01/2023    w/ Group A Strep bacteremia    COPD exacerbation (HCC) 03/02/2023    Cough syncope 01/10/2023    Diabetic ketoacidosis without coma associated with type 2 diabetes mellitus (HCC) 02/18/2018    GI bleed 05/08/2022    Gout 02/01/2013    Hilar adenopathy     Iron deficiency anemia     Malignant neoplasm of urinary bladder (HCC) 02/09/2016    Multiple duodenal ulcers     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  No free air.         CT HEAD WO CONTRAST   Final Result   No acute intracranial abnormality.      Minimal fluid in the left maxillary sinus and left mastoid air cells.         XR ABDOMEN (2 VIEWS)   Final Result   No significant change in appearance of a suspected distal small bowel   obstruction, without evidence of free air.         XR ABDOMEN (2 VIEWS)   Final Result   Dilated loops of small bowel appear unchanged from the prior study compatible   with partial small bowel obstruction.         XR ABDOMEN (2 VIEWS)   Final Result   1. Unchanged dilated loops of small bowel.         XR ABDOMEN FOR NG/OG/NE TUBE PLACEMENT   Final Result   NG tube in place, with tip within the mid to upper body of the stomach.         CT ABDOMEN PELVIS W IV CONTRAST Additional Contrast? None   Final Result   1. Dilated small bowel loops with air-fluid levels and small bowel   fecalization in the terminal ileum. Findings are concerning for ileus versus   low-grade small bowel obstruction.   2. Circumferential wall thickening and hyperenhancement in the terminal   ileum. This may be related to infectious or inflammatory etiology.   3. Rim enhancing tubular fluid collection in the region of cecum extending   into the terminal ileum measuring 2.8 x 0.6 cm. Pre ileal fluid collection   measures 1.9 x 1.7 cm.  The findings could represent post appendectomy   seroma, hematoma versus abscess.   4. Patchy consolidation in the lung bases with mucous plugging in the left   lower lobe bronchi. This may be related to aspiration.   5. Cirrhosis.   6. Aorto bi-iliac graft is patent.         XR ABDOMEN (KUB) (SINGLE AP VIEW)   Final Result   Worsening small bowel obstruction.         XR ABDOMEN (2 VIEWS)   Final Result   Gastric distension with a few mildly dilated loops of small bowel in the left   upper abdominal quadrant.  Findings could represent gastroenteritis or   partial small bowel obstruction.         XR CHEST PORTABLE   Final

## 2025-03-22 LAB
ANION GAP SERPL CALCULATED.3IONS-SCNC: 12 MMOL/L (ref 3–16)
BASOPHILS # BLD: 0.1 K/UL (ref 0–0.2)
BASOPHILS NFR BLD: 1.2 %
BUN SERPL-MCNC: 16 MG/DL (ref 7–20)
CALCIUM SERPL-MCNC: 10.2 MG/DL (ref 8.3–10.6)
CHLORIDE SERPL-SCNC: 100 MMOL/L (ref 99–110)
CO2 SERPL-SCNC: 30 MMOL/L (ref 21–32)
CREAT SERPL-MCNC: 0.8 MG/DL (ref 0.8–1.3)
DEPRECATED RDW RBC AUTO: 18.4 % (ref 12.4–15.4)
EOSINOPHIL # BLD: 0.4 K/UL (ref 0–0.6)
EOSINOPHIL NFR BLD: 5.3 %
GFR SERPLBLD CREATININE-BSD FMLA CKD-EPI: >90 ML/MIN/{1.73_M2}
GLUCOSE BLD-MCNC: 108 MG/DL (ref 70–99)
GLUCOSE BLD-MCNC: 110 MG/DL (ref 70–99)
GLUCOSE BLD-MCNC: 117 MG/DL (ref 70–99)
GLUCOSE BLD-MCNC: 118 MG/DL (ref 70–99)
GLUCOSE BLD-MCNC: 121 MG/DL (ref 70–99)
GLUCOSE BLD-MCNC: 131 MG/DL (ref 70–99)
GLUCOSE SERPL-MCNC: 103 MG/DL (ref 70–99)
HCT VFR BLD AUTO: 36 % (ref 40.5–52.5)
HGB BLD-MCNC: 11.6 G/DL (ref 13.5–17.5)
LYMPHOCYTES # BLD: 2.4 K/UL (ref 1–5.1)
LYMPHOCYTES NFR BLD: 28.3 %
MCH RBC QN AUTO: 28.1 PG (ref 26–34)
MCHC RBC AUTO-ENTMCNC: 32.2 G/DL (ref 31–36)
MCV RBC AUTO: 87.1 FL (ref 80–100)
MONOCYTES # BLD: 0.7 K/UL (ref 0–1.3)
MONOCYTES NFR BLD: 8.3 %
NEUTROPHILS # BLD: 4.7 K/UL (ref 1.7–7.7)
NEUTROPHILS NFR BLD: 56.9 %
PERFORMED ON: ABNORMAL
PLATELET # BLD AUTO: 365 K/UL (ref 135–450)
PMV BLD AUTO: 7.1 FL (ref 5–10.5)
POTASSIUM SERPL-SCNC: 3.6 MMOL/L (ref 3.5–5.1)
RBC # BLD AUTO: 4.14 M/UL (ref 4.2–5.9)
SODIUM SERPL-SCNC: 142 MMOL/L (ref 136–145)
WBC # BLD AUTO: 8.3 K/UL (ref 4–11)

## 2025-03-22 PROCEDURE — 99232 SBSQ HOSP IP/OBS MODERATE 35: CPT | Performed by: INTERNAL MEDICINE

## 2025-03-22 PROCEDURE — 94761 N-INVAS EAR/PLS OXIMETRY MLT: CPT

## 2025-03-22 PROCEDURE — 2060000000 HC ICU INTERMEDIATE R&B

## 2025-03-22 PROCEDURE — 6370000000 HC RX 637 (ALT 250 FOR IP): Performed by: INTERNAL MEDICINE

## 2025-03-22 PROCEDURE — 6360000002 HC RX W HCPCS: Performed by: INTERNAL MEDICINE

## 2025-03-22 PROCEDURE — 94640 AIRWAY INHALATION TREATMENT: CPT

## 2025-03-22 PROCEDURE — 2580000003 HC RX 258: Performed by: INTERNAL MEDICINE

## 2025-03-22 PROCEDURE — 85025 COMPLETE CBC W/AUTO DIFF WBC: CPT

## 2025-03-22 PROCEDURE — 2500000003 HC RX 250 WO HCPCS: Performed by: INTERNAL MEDICINE

## 2025-03-22 PROCEDURE — 80048 BASIC METABOLIC PNL TOTAL CA: CPT

## 2025-03-22 PROCEDURE — 2700000000 HC OXYGEN THERAPY PER DAY

## 2025-03-22 PROCEDURE — 94660 CPAP INITIATION&MGMT: CPT

## 2025-03-22 PROCEDURE — 97530 THERAPEUTIC ACTIVITIES: CPT

## 2025-03-22 PROCEDURE — 97110 THERAPEUTIC EXERCISES: CPT

## 2025-03-22 PROCEDURE — 36415 COLL VENOUS BLD VENIPUNCTURE: CPT

## 2025-03-22 RX ORDER — LACTULOSE 10 G/15ML
20 SOLUTION ORAL 3 TIMES DAILY
Status: DISCONTINUED | OUTPATIENT
Start: 2025-03-22 | End: 2025-03-25 | Stop reason: HOSPADM

## 2025-03-22 RX ORDER — CLONIDINE HYDROCHLORIDE 0.1 MG/1
0.1 TABLET ORAL 2 TIMES DAILY
Status: DISCONTINUED | OUTPATIENT
Start: 2025-03-22 | End: 2025-03-25 | Stop reason: HOSPADM

## 2025-03-22 RX ORDER — ALBUTEROL SULFATE 0.83 MG/ML
2.5 SOLUTION RESPIRATORY (INHALATION)
Status: DISCONTINUED | OUTPATIENT
Start: 2025-03-22 | End: 2025-03-25 | Stop reason: HOSPADM

## 2025-03-22 RX ADMIN — ENOXAPARIN SODIUM 40 MG: 100 INJECTION SUBCUTANEOUS at 09:02

## 2025-03-22 RX ADMIN — ALBUTEROL SULFATE 2.5 MG: 2.5 SOLUTION RESPIRATORY (INHALATION) at 07:52

## 2025-03-22 RX ADMIN — ATORVASTATIN CALCIUM 40 MG: 40 TABLET, FILM COATED ORAL at 09:03

## 2025-03-22 RX ADMIN — METOPROLOL TARTRATE 50 MG: 50 TABLET, FILM COATED ORAL at 21:09

## 2025-03-22 RX ADMIN — PHENOL 1 SPRAY: 1.5 LIQUID ORAL at 09:03

## 2025-03-22 RX ADMIN — SODIUM CHLORIDE, PRESERVATIVE FREE 10 ML: 5 INJECTION INTRAVENOUS at 21:09

## 2025-03-22 RX ADMIN — Medication 15 ML: at 14:20

## 2025-03-22 RX ADMIN — PROCHLORPERAZINE MALEATE 10 MG: 10 TABLET ORAL at 13:07

## 2025-03-22 RX ADMIN — DILTIAZEM HYDROCHLORIDE 60 MG: 60 TABLET ORAL at 13:07

## 2025-03-22 RX ADMIN — PANTOPRAZOLE SODIUM 40 MG: 40 INJECTION, POWDER, LYOPHILIZED, FOR SOLUTION INTRAVENOUS at 09:02

## 2025-03-22 RX ADMIN — OXYCODONE 5 MG: 5 TABLET ORAL at 01:39

## 2025-03-22 RX ADMIN — Medication 4 ML: at 07:52

## 2025-03-22 RX ADMIN — ALLOPURINOL 300 MG: 300 TABLET ORAL at 09:03

## 2025-03-22 RX ADMIN — METOPROLOL TARTRATE 50 MG: 50 TABLET, FILM COATED ORAL at 09:03

## 2025-03-22 RX ADMIN — SODIUM CHLORIDE, PRESERVATIVE FREE 10 ML: 5 INJECTION INTRAVENOUS at 09:04

## 2025-03-22 RX ADMIN — DILTIAZEM HYDROCHLORIDE 60 MG: 60 TABLET ORAL at 21:08

## 2025-03-22 RX ADMIN — THIAMINE HYDROCHLORIDE 100 MG: 100 INJECTION, SOLUTION INTRAMUSCULAR; INTRAVENOUS at 09:02

## 2025-03-22 RX ADMIN — DILTIAZEM HYDROCHLORIDE 60 MG: 60 TABLET ORAL at 06:32

## 2025-03-22 RX ADMIN — GABAPENTIN 200 MG: 250 SOLUTION ORAL at 21:08

## 2025-03-22 RX ADMIN — QUETIAPINE FUMARATE 50 MG: 25 TABLET ORAL at 21:09

## 2025-03-22 RX ADMIN — Medication 5000 UNITS: at 09:03

## 2025-03-22 RX ADMIN — Medication 4 ML: at 19:16

## 2025-03-22 RX ADMIN — POLYETHYLENE GLYCOL (3350) 17 G: 17 POWDER, FOR SOLUTION ORAL at 09:03

## 2025-03-22 RX ADMIN — GABAPENTIN 200 MG: 250 SOLUTION ORAL at 03:07

## 2025-03-22 RX ADMIN — GABAPENTIN 200 MG: 250 SOLUTION ORAL at 14:20

## 2025-03-22 RX ADMIN — ZONISAMIDE 100 MG: 25 CAPSULE ORAL at 21:09

## 2025-03-22 RX ADMIN — DULOXETINE HYDROCHLORIDE 30 MG: 30 CAPSULE, DELAYED RELEASE ORAL at 09:03

## 2025-03-22 RX ADMIN — CLONIDINE HYDROCHLORIDE 0.1 MG: 0.1 TABLET ORAL at 13:07

## 2025-03-22 RX ADMIN — OXYCODONE 5 MG: 5 TABLET ORAL at 16:43

## 2025-03-22 RX ADMIN — LACTULOSE 20 G: 10 SOLUTION ORAL at 14:22

## 2025-03-22 RX ADMIN — GABAPENTIN 200 MG: 250 SOLUTION ORAL at 09:02

## 2025-03-22 RX ADMIN — ALBUTEROL SULFATE 2.5 MG: 0.83 SOLUTION RESPIRATORY (INHALATION) at 19:15

## 2025-03-22 RX ADMIN — CLONIDINE HYDROCHLORIDE 0.1 MG: 0.1 TABLET ORAL at 21:08

## 2025-03-22 RX ADMIN — PROCHLORPERAZINE MALEATE 10 MG: 10 TABLET ORAL at 21:09

## 2025-03-22 ASSESSMENT — PAIN DESCRIPTION - ORIENTATION
ORIENTATION: LEFT
ORIENTATION: LEFT

## 2025-03-22 ASSESSMENT — PAIN DESCRIPTION - LOCATION
LOCATION: ABDOMEN
LOCATION: ABDOMEN

## 2025-03-22 ASSESSMENT — PAIN SCALES - WONG BAKER: WONGBAKER_NUMERICALRESPONSE: HURTS A LITTLE BIT

## 2025-03-22 ASSESSMENT — PAIN SCALES - GENERAL
PAINLEVEL_OUTOF10: 4
PAINLEVEL_OUTOF10: 5

## 2025-03-22 ASSESSMENT — PAIN - FUNCTIONAL ASSESSMENT: PAIN_FUNCTIONAL_ASSESSMENT: PREVENTS OR INTERFERES SOME ACTIVE ACTIVITIES AND ADLS

## 2025-03-22 ASSESSMENT — PAIN DESCRIPTION - DESCRIPTORS
DESCRIPTORS: ACHING;SORE;BURNING
DESCRIPTORS: ACHING

## 2025-03-22 NOTE — PROGRESS NOTES
Progress Note    Patient Sandor Early  MRN: [id number]  YOB: 1955 Age: 69 y.o. Sex: male  Room: [address] [place] [address]       Admitting Physician: Luda Richey MD   Date of Admission: 1/30/2025  5:28 PM   Primary Care Physician: Juliet Mayorga MD     Subjective:  Sandor Early was seen and examined. We are following for PEG tube placement.  -- PEG tube in place looks good no significant redness or pain.    ROS:  Constitutional: Denies fever, no change in appetite  Respiratory: Denies cough or shortness of breath  Cardiovascular: Denies chest pain or edema    Objective:  Vital Signs:   Vitals:    03/22/25 1634   BP:    Pulse:    Resp:    Temp: 97 °F (36.1 °C)   SpO2:          Physical Exam:  Constitutional: Alert and oriented x 4. No acute distress.   HEENT: Sclera anicteric, mucosal membranes moist  Cardiovascular: Regular rate and rhythm.  No murmurs.  Respiratory: Respirations nonlabored, no crepitus  GI: Abdomen nondistended, soft, and nontender.  Normal active bowel sounds.  No masses palpable.   Rectal: Deferred  Musculoskeletal:  No pitting edema of the lower legs.  Neurological: Gross memory appears intact.  No asterixis    Intake/Output:    Intake/Output Summary (Last 24 hours) at 3/22/2025 1655  Last data filed at 3/22/2025 1647  Gross per 24 hour   Intake 1673.05 ml   Output 1230 ml   Net 443.05 ml        Current Medications:  Current Facility-Administered Medications   Medication Dose Route Frequency Provider Last Rate Last Admin    albuterol (PROVENTIL) (2.5 MG/3ML) 0.083% nebulizer solution 2.5 mg  2.5 mg Nebulization BID RT Willy España MD        cloNIDine (CATAPRES) tablet 0.1 mg  0.1 mg Oral BID Candace Mckeon MD   0.1 mg at 03/22/25 1307    lactulose (CHRONULAC) 10 GM/15ML solution 20 g  20 g Oral TID Candace Mckeon MD   20 g at 03/22/25 1422    QUEtiapine (SEROQUEL) tablet 50 mg  50 mg Oral QHS Candace Mckeon MD   50 mg at 03/21/25 2026     significant pain around the PEG site.  PEG tube loosened.  Plan:  Okay to use PEG  Clean around PEG site twice daily with dilute peroxide solution.  Call if we can be of further assistance.      Landon Hightower, DO    [facility]    [phone]. Also available via [employer] Serve    Please note that some or all of this record was generated using voice recognition software. If there are any questions about the content of this document, please contact the author as some errors in translation may have occurred.

## 2025-03-22 NOTE — PLAN OF CARE
Problem: Chronic Conditions and Co-morbidities  Goal: Patient's chronic conditions and co-morbidity symptoms are monitored and maintained or improved  3/21/2025 2329 by Bernardo De Leon RN  Outcome: Progressing  3/21/2025 2328 by Bernardo De Leon RN  Outcome: Progressing  Flowsheets (Taken 3/21/2025 2000)  Care Plan - Patient's Chronic Conditions and Co-Morbidity Symptoms are Monitored and Maintained or Improved: Monitor and assess patient's chronic conditions and comorbid symptoms for stability, deterioration, or improvement     Problem: Discharge Planning  Goal: Discharge to home or other facility with appropriate resources  3/21/2025 2329 by Bernardo De Leon RN  Outcome: Progressing  3/21/2025 2328 by Bernardo De Leon RN  Outcome: Progressing  Flowsheets (Taken 3/21/2025 2000)  Discharge to home or other facility with appropriate resources: Identify barriers to discharge with patient and caregiver     Problem: Safety - Adult  Goal: Free from fall injury  3/21/2025 2329 by Bernardo De Leon RN  Outcome: Progressing  3/21/2025 2328 by Bernardo De Leon RN  Outcome: Progressing     Problem: Pain  Goal: Verbalizes/displays adequate comfort level or baseline comfort level  3/21/2025 2329 by Bernardo De Leon RN  Outcome: Progressing  3/21/2025 2328 by Bernardo De Leon RN  Outcome: Progressing     Problem: Respiratory - Adult  Goal: Achieves optimal ventilation and oxygenation  3/21/2025 2329 by Bernardo De Leon RN  Outcome: Progressing  3/21/2025 2328 by Bernardo De Leon RN  Outcome: Progressing  Flowsheets (Taken 3/21/2025 2000)  Achieves optimal ventilation and oxygenation:   Assess for changes in respiratory status   Assess for changes in mentation and behavior   Position to facilitate oxygenation and minimize respiratory effort   Oxygen supplementation based on oxygen saturation or arterial blood gases   Initiate smoking cessation protocol as indicated   Assess the need for suctioning and aspirate as

## 2025-03-22 NOTE — PROGRESS NOTES
Patient awakens, takes bipap off, requests break, pain in abdomen 5/10, pain medication given.  Oxygen 4 liters replaced.  Will continue to monitor.

## 2025-03-22 NOTE — PROGRESS NOTES
Patient Alert and oriented, forgetful at times.  Pleasant and cooperative, lung sounds clear, on 4 liters NC.  Bowel sounds active, tube feeding infusing as ordered, tolerating well, rate increased.  Educated HOB has to remain elevated, oral care complete.  Trace edema BLE.  Scattered bruising.  Castrejon catheter in place, urine clear yellow.  Gastric tube dressing CDI.  Will continue to monitor.  Call light in reach, bed alarm on.

## 2025-03-22 NOTE — PROGRESS NOTES
[facility]                            Critical care Progress Note :    CC: follow on RF post vent     Events of Last 24 hours:  On 4 L   Some nasal bleed mild after picking his nose    Now needed BiPAP 15/5 fr 6 hours   Secretions better   For peg tube   Has retention followed by Urology  Some agitation at night   S/P r PEG tube by GI       IV:   sodium chloride Stopped (03/21/25 0830)    sodium chloride      dextrose         Vitals:  Blood pressure 133/76, pulse (!) 108, temperature 97.5 °F (36.4 °C), temperature source Temporal, resp. rate 23, height 1.702 m (5' 7.01\"), weight 76.1 kg (167 lb 12.3 oz), SpO2 92%.  on 6 L    EXAM:  General: not in distress.    Eyes: No sclera icterus. No conjunctival injection.  ENT: No discharge.   Neck: Trachea midline. Has possible knot chest couild be cyst to folow OP ,pt aware   Resp: No accessory muscle use. Few crackles. No wheezing. Few rhonchi.   CV: Regular  rate. Regular rhythm. No mumur or rub. No edema.   GI: Non-tender. Non-distended.   Skin: Warm and dry. No rash on exposed extremities.  M/S: No cyanosis. No clubbing.   Neuro: + following commands.  Good cough.   Psych: lethargic today        Scheduled Meds:   albuterol  2.5 mg Nebulization BID RT    cloNIDine  0.1 mg Oral BID    lactulose  20 g Oral TID    QUEtiapine  50 mg Oral QHS    zonisamide  100 mg Oral QHS    thiamine  100 mg IntraVENous Daily    sodium chloride (Inhalant)  4 mL Nebulization BID RT    Gabapentin  200 mg Per NG tube Q6H    DULoxetine  30 mg Oral Daily    CENTRUM/CERTA-KAYKAY with minerals oral  15 mL Oral Daily    dilTIAZem  60 mg Oral 3 times per day    metoprolol tartrate  50 mg Oral BID    polyethylene glycol  17 g Oral Daily    sodium chloride flush  5-40 mL IntraVENous 2 times per day    insulin lispro  0-16 Units SubCUTAneous Q4H    pantoprazole (PROTONIX) 40 mg in sodium chloride (PF) 0.9 % 10 mL injection  40 mg IntraVENous Daily     enoxaparin  40 mg SubCUTAneous Daily    allopurinol  300 mg Oral Daily    vitamin D3  5,000 Units Oral Daily    atorvastatin  40 mg Oral Daily       Data:  CBC:   Recent Labs     03/20/25  0434 03/21/25  0421   WBC 8.4 7.9   HGB 10.8* 11.1*   HCT 34.4* 35.1*   MCV 87.9 87.3    370     BMP:   Recent Labs     03/20/25  0434 03/21/25  0421    135*   K 3.7 3.1*   CL 98* 96*   CO2 33* 28   BUN 11 13   CREATININE 0.7* 0.7*     LIVER PROFILE:   No results for input(s): \"AST\", \"ALT\", \"LIPASE\", \"AMYLASE\", \"BILIDIR\", \"BILITOT\", \"ALKPHOS\" in the last 72 hours.    Invalid input(s): \"ALB\"      Microbiology:  1/20 Flu DETECTED  2/5 Strep and leg negative   2/13 BC NGTD  2/13 respiratory MRSA  2/13 MRSA DNA probe +   2/21 BAL NGTD      Imaging:  CXR 3/4 images independently reviewed by me and showed:  Some improved perihilar opacities centrally.         ASSESSMENT:  Acute hypoxemic hypercapnic respiratory failure   Agitation/restlessness  Acute encephalopathy/Metabolic encephalopathy   Tracheobronchitis with possible PNA-MRSA  RUE edema- SVT  COPD   Alcohol withdrawal with Delirium tremens  Ileus/Enteritis   Chronic hypoxia on 4 L O2  Flu A + 1/30  Tobacco abuse  ROCKWELL cirrhosis  Chronic diastolic dysfunction  DM2  PVD status post aortoiliac bpg  Patient has had several episodes of seizure-like activity   Alcohol use/abuse 30 beer/day        PLAN:  Supplemental oxygen to maintain SaO2 >92%; wean as tolerated  BIPAP now   Going on BiPAP 10/5   Wean o2   CXR r/o aspiration ,looks good   Castrejon as per urology   Replace K and to go PO  Had PEG tube   Inhaled bronchodilators  Metanebs and prn nt suctioning  Seroquel 50 PO daily  S/P Peg tube t  ,all abx stopped   Completed Vanc D#10/10, Zosyn D#8 and Diflucan D#7, Tamiflu D#5 and Zpak  Keppra started by neurology- Seizure and fall precautions  Cardizem, Metoprolol 50mg BID, Clonidine patch  Hydralazine 10 mg IV Q4 hrs PRN for SBP>160    PT/OT  Blood sugar control ISS goal

## 2025-03-22 NOTE — PROGRESS NOTES
03/21/25 2321   NIV Type   Equipment Type v60   Mode Bilevel   Mask Type Full face mask   Mask Size Extra large   Assessment   Pulse 80   Respirations 14   SpO2 93 %   Settings/Measurements   IPAP 15 cmH20   CPAP/EPAP 5 cmH2O   Vt (Measured) 498 mL   Rate Ordered 18   FiO2  40 %   Minute Volume (L/min) 11.5 Liters   Mask Leak (lpm) 57 lpm   Patient's Home Machine No   Patient Observation   Patient Observations spo2 98% on 40% bipap

## 2025-03-22 NOTE — PROGRESS NOTES

## 2025-03-22 NOTE — PROGRESS NOTES
Progress Note    Admit Date:  1/30/2025    Interval history    Admitted for flu, copd exacerbation, course complicated with development of sbo, resp failure needing vent support     Tolerated pressure support 5/5 for 6 hours yesterday.  Plans for spontaneous breathing trial and possible extubation today.  On Precedex.    3/5-extubated on 3/4/2025.  Placed on BiPAP.  Has been intermittently confused.  Was on Precedex.  Has had some intermittent agitation.  When I saw the patient his eyes are open.   at bedside.  He has a good cough.    3/6- more calm then yesterday. On Bipap. Failed speech eval but they will see him again. Responding nicely to verbal commands,    3/7- more calm having thick secretions. ST has recommended MBS. He is NPO. Used Bipap last night. BP elevated today.    3/9- MR singer seen awake, but confused, restless was given seroquel for agitation. Confused and not directable only for few minutes and gets restless, throwing stuff  Was on precedex overnight but now off    3/10  Continues to be disoriented   Recd haldol yesterday  Now in restraints  Used BiPAP at night   Now on 5 L     3/11  Disoriented   Off restraints now   biPAP for 4-5 hours last night   On 5 L now     3/12   Continues to be confused   On BiPAP at night   5 L now     3/13  On BiPAP  Restless,disoriented     3/14   On BIPAP all night  Now on 4 L   Confused     3/15   On Bipap at night  Now on 4 L     3/16   Bipap at night   Now on 4 L     3/17- intermittently agitated. Sitter at bedside. A little more calm this am and answering some questions when I talked to him.     3/18-more calm this morning but may be just waking up from his sleep.  Speech saw the patient did not regain swallow.  Recommended meds to be crushed and given.  Family considering alternative modes of feeding-PEG tube.  GI consulted.  Psychiatry has seen patient.      3/19-he is more calm today but has.  The confusion and agitation.  Family has agreed

## 2025-03-22 NOTE — PLAN OF CARE
Problem: Chronic Conditions and Co-morbidities  Goal: Patient's chronic conditions and co-morbidity symptoms are monitored and maintained or improved  Outcome: Progressing     Problem: Discharge Planning  Goal: Discharge to home or other facility with appropriate resources  Outcome: Progressing     Problem: Safety - Adult  Goal: Free from fall injury  Outcome: Progressing     Problem: Pain  Goal: Verbalizes/displays adequate comfort level or baseline comfort level  Outcome: Progressing     Problem: Respiratory - Adult  Goal: Achieves optimal ventilation and oxygenation  Outcome: Progressing     Problem: Cardiovascular - Adult  Goal: Maintains optimal cardiac output and hemodynamic stability  Outcome: Progressing

## 2025-03-22 NOTE — PROGRESS NOTES
BiPAP off, oxygen 4 liters replaced, rested well tonight, on bipap most of the night.  Mouth swabbed.  SR on monitor with 1st degree AV block.      Castrejon catheter beryl urine 225ml out over past 12 hours.    Tube feeding running at goal.  Tolerating well.      Call light in hand, will continue to monitor, bed alarm on.

## 2025-03-22 NOTE — PROGRESS NOTES
Patient resting well on bipap, no s/s distress noted, lung sounds clear and diminished, chavira urine clear yellow 150ml out for this shift.  Tube feeding tolerating well.  Patient turned self in bed.  HOB elevated.  Bed alarm on, call light in reach.  Bed alarm on.

## 2025-03-22 NOTE — PROGRESS NOTES
BiPAP replaced per RT.  Tolerating well.  Educated patient SaO2 drops to 85% on 4 liters NC.  Currently 100% SaO2 on 40% FiO2 bipap.

## 2025-03-22 NOTE — PROGRESS NOTES
dynamic  Standing:    Initially Mod A , progressed to CGA with extended time - static and dynamic    Bed Mobility:   Supine to Sit:    SBA, With increased time, and With use of bedrail  Sit to Supine:   Not Tested  Rolling:   Not Tested  Scooting in sitting: SBA to scoot hips forward EOB and back in recliner  Scooting in supine:  Not Tested    Transfers:    Sit to stand:    Min A  up to RW   Stand to sit:    Min A  from RW  Bed to chair:     CGA, With RW and gait belt, With increased time, and With cues for hand placement  Bed/ chair to standard toilet:  Not Tested  Bed/chair to BSC:   Not Tested  Functional Mobility:   Not Tested    ADLs:  Dressing:      UE:   Not Tested  LE:    Max A  to don socks     Bathing:    UE:  Not Tested  LE:  Not Tested    Eating:   Independent to bring ice chips to mouth    Toileting:  Not Tested    Grooming/hygiene: SBA to wipe face with washcloth, seated EOB    Ther Ex / Activities Initiated:   N/A  Pt tolerated sitting EOB for approximately 20 minutes before transferring to chair    Positioning Needs:   Pt up in chair and alarm set  Call light provided and all needs within reach  RN aware of pt position/status  Pt had two visitors upon end of session    Patient/Family Education:   Pt educated on role of inpatient OT, plan of care, importance of continued activity, DC recommendations, functional transfer/mobility safety, transfer techniques, pacing activity, and calling for assist with mobility    CHF Education  N/A    Assessment:  Pt seen for occupational therapy followup session in the acute care setting.  Pt limited by decreased activity tolerance, endurance, strength, as well as poor safety awareness this date. Required multiple verbal cues for impulsivity during functional mobility. Making good progress toward goals. Pt functioning below baseline and will likely benefit from continued skilled occupational therapy services to maximize safety and independence.     Recommending SNF  upon discharge as patient functioning below baseline, demonstrates good rehab potential and unable to return home due to burden of care beyond caregiver ability, home environment not conducive to patient recovery, and limited safety awareness.    Goal(s) : 3/22/25: all goals ongoing  To be met in 3 Visits:  Bed to toilet/BSC:                                                                   Supervision     To be met in 5 Visits:  Supine to/from Sit in preparation for ADL task:                      Independent  Toileting                                                                                  Modified Independent  Grooming                                                                                Independent  Upper Body Dressing:                                                            Independent  Lower Body Dressing:                                                            Modified Independent  Pt to demonstrate UE therapeutic exs x 15 reps with minimal cues    Rehabilitation Potential: Good  Strengths for achieving goals include: PLOF and Pt cooperative   Barriers to achieving goals include:  Complexity of condition, Pain, and Weakness    Plan:  To be seen 3-5 x/ week while in acute care setting for therapeutic exercises/activities, bed mobility training, functional transfer training, family/patient education, ADL/IADL retraining, energy conservation training, and balance training.    Electronically signed by Rossana Badillo OT on 3/22/2025 at 2:08 PM      If patient discharges from this facility prior to next visit, this note will serve as the Discharge Summary

## 2025-03-22 NOTE — PROGRESS NOTES
03/22/25 0301   NIV Type   Equipment Type v60   Mode Bilevel   Mask Type Full face mask   Mask Size Extra large   Assessment   Pulse 94   Respirations 14   SpO2 (!) 86 %   Settings/Measurements   IPAP 15 cmH20   CPAP/EPAP 5 cmH2O   Vt (Measured) 641 mL   Rate Ordered 18   FiO2  40 %   Minute Volume (L/min) 17.3 Liters   Mask Leak (lpm) 42 lpm   Patient's Home Machine No   Patient Observation   Patient Observations spo2 97% on 40% bipap

## 2025-03-22 NOTE — PROGRESS NOTES
Shift assessment completed, see flow sheet.   Pt is A/O x4 pt is forgetful at times.  Pt denies any pain is tender around surgical site/PEG.  Pt denies any N/V.    SpO2 92%. Respirations are easy, even, and unlabored.   Bilateral lung sounds diminished  on 4L.     VSS  SR on the monitor  PEG in place , with TF at 60 mL/hr, 50 residual.      PIV, WNL     All lines and monitoring devices in place. Castrejon is patent and secured.  .  Bed in lowest position with wheels locked. No needs expressed at this time.

## 2025-03-22 NOTE — PROGRESS NOTES
SaO2 dropping to 85% on 4 liters NC at rest, turned up to 6 liters NC, will replace bipap.  Patient tube feeding on, unable to keep patient up in bed, scoots self to bottom of bed, not increasing tube feed rate until am.  Will continue to monitor.  Call light in reach, bed alarm on.

## 2025-03-23 LAB
ANION GAP SERPL CALCULATED.3IONS-SCNC: 9 MMOL/L (ref 3–16)
BUN SERPL-MCNC: 17 MG/DL (ref 7–20)
CALCIUM SERPL-MCNC: 9.2 MG/DL (ref 8.3–10.6)
CHLORIDE SERPL-SCNC: 101 MMOL/L (ref 99–110)
CO2 SERPL-SCNC: 32 MMOL/L (ref 21–32)
CREAT SERPL-MCNC: 0.8 MG/DL (ref 0.8–1.3)
DEPRECATED RDW RBC AUTO: 18.4 % (ref 12.4–15.4)
GFR SERPLBLD CREATININE-BSD FMLA CKD-EPI: >90 ML/MIN/{1.73_M2}
GLUCOSE BLD-MCNC: 108 MG/DL (ref 70–99)
GLUCOSE BLD-MCNC: 120 MG/DL (ref 70–99)
GLUCOSE BLD-MCNC: 122 MG/DL (ref 70–99)
GLUCOSE BLD-MCNC: 130 MG/DL (ref 70–99)
GLUCOSE BLD-MCNC: 134 MG/DL (ref 70–99)
GLUCOSE SERPL-MCNC: 121 MG/DL (ref 70–99)
HCT VFR BLD AUTO: 35.6 % (ref 40.5–52.5)
HGB BLD-MCNC: 11.3 G/DL (ref 13.5–17.5)
MCH RBC QN AUTO: 27.5 PG (ref 26–34)
MCHC RBC AUTO-ENTMCNC: 31.8 G/DL (ref 31–36)
MCV RBC AUTO: 86.3 FL (ref 80–100)
PERFORMED ON: ABNORMAL
PLATELET # BLD AUTO: 336 K/UL (ref 135–450)
PMV BLD AUTO: 7.2 FL (ref 5–10.5)
POTASSIUM SERPL-SCNC: 3.7 MMOL/L (ref 3.5–5.1)
RBC # BLD AUTO: 4.12 M/UL (ref 4.2–5.9)
SODIUM SERPL-SCNC: 142 MMOL/L (ref 136–145)
WBC # BLD AUTO: 11.1 K/UL (ref 4–11)

## 2025-03-23 PROCEDURE — 6370000000 HC RX 637 (ALT 250 FOR IP): Performed by: INTERNAL MEDICINE

## 2025-03-23 PROCEDURE — 2060000000 HC ICU INTERMEDIATE R&B

## 2025-03-23 PROCEDURE — 99232 SBSQ HOSP IP/OBS MODERATE 35: CPT | Performed by: INTERNAL MEDICINE

## 2025-03-23 PROCEDURE — 80048 BASIC METABOLIC PNL TOTAL CA: CPT

## 2025-03-23 PROCEDURE — 36415 COLL VENOUS BLD VENIPUNCTURE: CPT

## 2025-03-23 PROCEDURE — 6360000002 HC RX W HCPCS: Performed by: INTERNAL MEDICINE

## 2025-03-23 PROCEDURE — 94761 N-INVAS EAR/PLS OXIMETRY MLT: CPT

## 2025-03-23 PROCEDURE — 94640 AIRWAY INHALATION TREATMENT: CPT

## 2025-03-23 PROCEDURE — 2500000003 HC RX 250 WO HCPCS: Performed by: INTERNAL MEDICINE

## 2025-03-23 PROCEDURE — 85027 COMPLETE CBC AUTOMATED: CPT

## 2025-03-23 PROCEDURE — 94660 CPAP INITIATION&MGMT: CPT

## 2025-03-23 PROCEDURE — 2700000000 HC OXYGEN THERAPY PER DAY

## 2025-03-23 RX ADMIN — ALBUTEROL SULFATE 2.5 MG: 0.83 SOLUTION RESPIRATORY (INHALATION) at 07:51

## 2025-03-23 RX ADMIN — LACTULOSE 20 G: 10 SOLUTION ORAL at 08:49

## 2025-03-23 RX ADMIN — METOPROLOL TARTRATE 50 MG: 50 TABLET, FILM COATED ORAL at 08:54

## 2025-03-23 RX ADMIN — OXYCODONE 5 MG: 5 TABLET ORAL at 15:26

## 2025-03-23 RX ADMIN — GABAPENTIN 200 MG: 250 SOLUTION ORAL at 20:37

## 2025-03-23 RX ADMIN — CLONIDINE HYDROCHLORIDE 0.1 MG: 0.1 TABLET ORAL at 20:37

## 2025-03-23 RX ADMIN — GABAPENTIN 200 MG: 250 SOLUTION ORAL at 15:27

## 2025-03-23 RX ADMIN — QUETIAPINE FUMARATE 50 MG: 25 TABLET ORAL at 20:36

## 2025-03-23 RX ADMIN — DILTIAZEM HYDROCHLORIDE 60 MG: 60 TABLET ORAL at 20:36

## 2025-03-23 RX ADMIN — ALBUTEROL SULFATE 2.5 MG: 0.83 SOLUTION RESPIRATORY (INHALATION) at 19:54

## 2025-03-23 RX ADMIN — LACTULOSE 20 G: 10 SOLUTION ORAL at 15:27

## 2025-03-23 RX ADMIN — Medication 15 ML: at 08:53

## 2025-03-23 RX ADMIN — METOPROLOL TARTRATE 50 MG: 50 TABLET, FILM COATED ORAL at 20:36

## 2025-03-23 RX ADMIN — SODIUM CHLORIDE, PRESERVATIVE FREE 10 ML: 5 INJECTION INTRAVENOUS at 08:51

## 2025-03-23 RX ADMIN — DULOXETINE HYDROCHLORIDE 30 MG: 30 CAPSULE, DELAYED RELEASE ORAL at 08:54

## 2025-03-23 RX ADMIN — ZONISAMIDE 100 MG: 25 CAPSULE ORAL at 20:50

## 2025-03-23 RX ADMIN — PANTOPRAZOLE SODIUM 40 MG: 40 INJECTION, POWDER, LYOPHILIZED, FOR SOLUTION INTRAVENOUS at 08:52

## 2025-03-23 RX ADMIN — GABAPENTIN 200 MG: 250 SOLUTION ORAL at 02:43

## 2025-03-23 RX ADMIN — ATORVASTATIN CALCIUM 40 MG: 40 TABLET, FILM COATED ORAL at 08:54

## 2025-03-23 RX ADMIN — GABAPENTIN 200 MG: 250 SOLUTION ORAL at 08:56

## 2025-03-23 RX ADMIN — ALLOPURINOL 300 MG: 300 TABLET ORAL at 08:54

## 2025-03-23 RX ADMIN — OXYCODONE 5 MG: 5 TABLET ORAL at 02:43

## 2025-03-23 RX ADMIN — CLONIDINE HYDROCHLORIDE 0.1 MG: 0.1 TABLET ORAL at 08:54

## 2025-03-23 RX ADMIN — ENOXAPARIN SODIUM 40 MG: 100 INJECTION SUBCUTANEOUS at 08:49

## 2025-03-23 RX ADMIN — THIAMINE HYDROCHLORIDE 100 MG: 100 INJECTION, SOLUTION INTRAMUSCULAR; INTRAVENOUS at 08:50

## 2025-03-23 RX ADMIN — Medication 4 ML: at 07:51

## 2025-03-23 RX ADMIN — PROCHLORPERAZINE MALEATE 10 MG: 10 TABLET ORAL at 17:53

## 2025-03-23 RX ADMIN — Medication 5000 UNITS: at 08:54

## 2025-03-23 RX ADMIN — DILTIAZEM HYDROCHLORIDE 60 MG: 60 TABLET ORAL at 15:27

## 2025-03-23 RX ADMIN — DILTIAZEM HYDROCHLORIDE 60 MG: 60 TABLET ORAL at 05:15

## 2025-03-23 RX ADMIN — SODIUM CHLORIDE, PRESERVATIVE FREE 10 ML: 5 INJECTION INTRAVENOUS at 21:04

## 2025-03-23 RX ADMIN — Medication 4 ML: at 19:53

## 2025-03-23 ASSESSMENT — PAIN - FUNCTIONAL ASSESSMENT: PAIN_FUNCTIONAL_ASSESSMENT: ACTIVITIES ARE NOT PREVENTED

## 2025-03-23 ASSESSMENT — PAIN SCALES - GENERAL
PAINLEVEL_OUTOF10: 5
PAINLEVEL_OUTOF10: 0
PAINLEVEL_OUTOF10: 4
PAINLEVEL_OUTOF10: 0
PAINLEVEL_OUTOF10: 2

## 2025-03-23 ASSESSMENT — PAIN SCALES - WONG BAKER: WONGBAKER_NUMERICALRESPONSE: HURTS A LITTLE BIT

## 2025-03-23 ASSESSMENT — PAIN DESCRIPTION - ORIENTATION
ORIENTATION: LEFT
ORIENTATION: MID;LEFT

## 2025-03-23 ASSESSMENT — PAIN DESCRIPTION - LOCATION
LOCATION: ABDOMEN
LOCATION: ABDOMEN

## 2025-03-23 ASSESSMENT — PAIN DESCRIPTION - DESCRIPTORS
DESCRIPTORS: ACHING
DESCRIPTORS: ACHING

## 2025-03-23 NOTE — PROGRESS NOTES
03/22/25 2328   NIV Type   Equipment Type v60   Mode Bilevel   Mask Type Full face mask   Mask Size Extra large   Assessment   Pulse 86   Respirations 17   SpO2 100 %   Settings/Measurements   IPAP 15 cmH20   CPAP/EPAP 5 cmH2O   Vt (Measured) 599 mL   Rate Ordered 18   FiO2  40 %   Minute Volume (L/min) 7.5 Liters   Mask Leak (lpm) 44 lpm   Patient's Home Machine No   Patient Observation   Patient Observations spo2 100% on 40% bipap

## 2025-03-23 NOTE — FLOWSHEET NOTE
03/23/25 0029   Vitals   Pulse 95   Heart Rate Source Monitor   Respirations 16   /65   MAP (Calculated) 86   MAP (mmHg) 82   BP Location Left leg   Patient Position High fowlers   Cardiac Rhythm Sinus rhythm   Oxygen Therapy   SpO2 99 %   O2 Device PAP (positive airway pressure)   Ventilator Associated Pneumonia Bundle   Anti-Embolism Devices Other (Comment)   Anti-Embolism Intervention Medication  (lovenox)   Laterality Bilateral     Patient resting in bed. No S/S of acute distress noted. Call light in easy reach.

## 2025-03-23 NOTE — PROGRESS NOTES
Rounding completed with Dr. Joyce and multidisciplinary team. POC, labs, lines and assessment reviewed.

## 2025-03-23 NOTE — PROGRESS NOTES
Bedside report and transfer of care given to Geri Thompson. Pt currently resting in bed with the call light within reach. Pt denies any other care needs at this time. Pt stable at this time.

## 2025-03-23 NOTE — PROGRESS NOTES
[facility]                            Critical care Progress Note :    CC: follow on RF post vent     Events of Last 24 hours:  On 4 L   No more nasal bleed   Now needed BiPAP 15/5 fr 6 hours   Secretions better   For peg tube   Has retention followed by Urology  Some agitation at night   S/P PEG tube by GI       IV:   sodium chloride Stopped (03/21/25 0830)    sodium chloride      dextrose         Vitals:  Blood pressure (!) 141/76, pulse (!) 103, temperature 97.7 °F (36.5 °C), temperature source Temporal, resp. rate 23, height 1.702 m (5' 7.01\"), weight 80 kg (176 lb 5.9 oz), SpO2 98%.  on 6 L    EXAM:  General: not in distress.    Eyes: No sclera icterus. No conjunctival injection.  ENT: No discharge.   Neck: Trachea midline. Has possible knot chest couild be cyst to folow OP ,pt aware   Resp: No accessory muscle use. Few crackles. No wheezing. Few rhonchi.   CV: Regular  rate. Regular rhythm. No mumur or rub. No edema.   GI: Non-tender. Non-distended.   Skin: Warm and dry. No rash on exposed extremities.  M/S: No cyanosis. No clubbing.   Neuro: + following commands.  Good cough.   Psych: lethargic today        Scheduled Meds:   albuterol  2.5 mg Nebulization BID RT    cloNIDine  0.1 mg Oral BID    lactulose  20 g Oral TID    QUEtiapine  50 mg Oral QHS    zonisamide  100 mg Oral QHS    thiamine  100 mg IntraVENous Daily    sodium chloride (Inhalant)  4 mL Nebulization BID RT    Gabapentin  200 mg Per NG tube Q6H    DULoxetine  30 mg Oral Daily    CENTRUM/CERTA-KAYKAY with minerals oral  15 mL Oral Daily    dilTIAZem  60 mg Oral 3 times per day    metoprolol tartrate  50 mg Oral BID    polyethylene glycol  17 g Oral Daily    sodium chloride flush  5-40 mL IntraVENous 2 times per day    insulin lispro  0-16 Units SubCUTAneous Q4H    pantoprazole (PROTONIX) 40 mg in sodium chloride (PF) 0.9 % 10 mL injection  40 mg IntraVENous Daily    enoxaparin  40 mg SubCUTAneous  hrs PRN for SBP>160    PT/OT  Blood sugar control ISS goal 150-180  GI prophylaxis: PPI  DVT prophylaxis: Lovenox restart   LTAC evaluation   Discussed with family at the bedside

## 2025-03-23 NOTE — PROGRESS NOTES
Patient resting in bed, patient is pink, warm, and dry. Respirations E/E on 4l/m/nc. Iv sites are unremarkable. No S/S of acute distress noted. Head to toe completed at this time. Patient was complaining of nausea residual checked less than 5 ml noted. Compazine given. Patient tolerated well. Call light in easy reach, patient reminded to use call light for needs and assistance. Bed locked and in lowest position side rails up x2.

## 2025-03-23 NOTE — PROGRESS NOTES
Progress Note    Admit Date:  1/30/2025    Interval history    Admitted for flu, copd exacerbation, course complicated with development of sbo, resp failure needing vent support     Tolerated pressure support 5/5 for 6 hours yesterday.  Plans for spontaneous breathing trial and possible extubation today.  On Precedex.    3/5-extubated on 3/4/2025.  Placed on BiPAP.  Has been intermittently confused.  Was on Precedex.  Has had some intermittent agitation.  When I saw the patient his eyes are open.   at bedside.  He has a good cough.    3/6- more calm then yesterday. On Bipap. Failed speech eval but they will see him again. Responding nicely to verbal commands,    3/7- more calm having thick secretions. ST has recommended MBS. He is NPO. Used Bipap last night. BP elevated today.    3/9- MR singer seen awake, but confused, restless was given seroquel for agitation. Confused and not directable only for few minutes and gets restless, throwing stuff  Was on precedex overnight but now off    3/10  Continues to be disoriented   Recd haldol yesterday  Now in restraints  Used BiPAP at night   Now on 5 L     3/11  Disoriented   Off restraints now   biPAP for 4-5 hours last night   On 5 L now     3/12   Continues to be confused   On BiPAP at night   5 L now     3/13  On BiPAP  Restless,disoriented     3/14   On BIPAP all night  Now on 4 L   Confused     3/15   On Bipap at night  Now on 4 L     3/16   Bipap at night   Now on 4 L     3/17- intermittently agitated. Sitter at bedside. A little more calm this am and answering some questions when I talked to him.     3/18-more calm this morning but may be just waking up from his sleep.  Speech saw the patient did not regain swallow.  Recommended meds to be crushed and given.  Family considering alternative modes of feeding-PEG tube.  GI consulted.  Psychiatry has seen patient.      3/19-he is more calm today but has.  The confusion and agitation.  Family has agreed  fossa).   ECHO    Left Ventricle: Normal left ventricular systolic function with a visually estimated EF of 55 - 60%. Left ventricle size is normal. Mildly increased wall thickness. Findings consistent with concentric remodeling. Paradoxical septal motion. There are regional wall motion abnormalities with paradoxical septal motion. Grade I diastolic dysfunction with normal LAP.    Right Ventricle: Right ventricle is mildly dilated. Normal systolic function.    Aortic Valve: Not well visualized. Mild sclerosis of the aortic valve cusps. No regurgitation. No stenosis.    Mitral Valve: Mild annular calcification. No regurgitation. No stenosis noted.    Tricuspid Valve: Not well visualized. Trace regurgitation. Unable to assess RVSP due to inadequate or insignificant tricuspid regurgitation.    Right Atrium: Right atrium is mildly dilated.    Image quality is technically difficult. Limited study due to patient's condition, patient's ability to tolerate test and procedure performed with the patient in a supine position.    Sputum- MRSA     Assessment/Plan:    Acute on chronic hypoxic and hypercarbic resp failure   Sec to Influenza A and COPD exacerbation  -Placed on BiPAP and admitted to the ICU  -Used BiPAP on admission and improved some initially  -Pred taper -and tamiflu completed     Resp failure worsened with development of SBO and required vent support   -Over the course of his hospitalization he has received vancomycin, Zosyn, Diflucan, Z-Zev and Tamiflu.   sputum with MRSA    - repeat steroids , HHn given- improved slowly and now off vent   -pulmonary managing   -He was extubated on 3/4/2025.  -On prn Haldol. Using Bipap as needed. -Mentation some improved.  Modified barium swallow done.  NG tube removed for now.  NG removed so that he would have more success with modified barium swallow.   biPAP at night   Now on 4 L   -Family has agreed for PEG. US ordered to assess ascites to see if PEG can be placed. PEG

## 2025-03-23 NOTE — PROGRESS NOTES
Shift assessment complete- see flowsheets.  Pt is A&Ox4. Very forgetful.    VSS  Respirations are easy, even and unlabored. Pt is on 3L NC    PIV WDL. Flushed at this time.  PEG in place- TF at goal. Low residual.    Plan of care and goals reviewed. Call light within reach.  Bed in lowest position with wheels locked.

## 2025-03-23 NOTE — PROGRESS NOTES

## 2025-03-24 VITALS
OXYGEN SATURATION: 99 % | HEIGHT: 67 IN | HEART RATE: 72 BPM | RESPIRATION RATE: 18 BRPM | SYSTOLIC BLOOD PRESSURE: 137 MMHG | DIASTOLIC BLOOD PRESSURE: 75 MMHG | TEMPERATURE: 98.3 F | BODY MASS INDEX: 27.68 KG/M2 | WEIGHT: 176.37 LBS

## 2025-03-24 LAB
FUNGUS SPEC CULT: NORMAL
GLUCOSE BLD-MCNC: 101 MG/DL (ref 70–99)
GLUCOSE BLD-MCNC: 114 MG/DL (ref 70–99)
GLUCOSE BLD-MCNC: 121 MG/DL (ref 70–99)
GLUCOSE BLD-MCNC: 130 MG/DL (ref 70–99)
GLUCOSE BLD-MCNC: 137 MG/DL (ref 70–99)
GLUCOSE BLD-MCNC: 138 MG/DL (ref 70–99)
GLUCOSE BLD-MCNC: 95 MG/DL (ref 70–99)
LOEFFLER MB STN SPEC: NORMAL
PERFORMED ON: ABNORMAL
PERFORMED ON: NORMAL

## 2025-03-24 PROCEDURE — 2700000000 HC OXYGEN THERAPY PER DAY

## 2025-03-24 PROCEDURE — 99232 SBSQ HOSP IP/OBS MODERATE 35: CPT | Performed by: INTERNAL MEDICINE

## 2025-03-24 PROCEDURE — 6370000000 HC RX 637 (ALT 250 FOR IP): Performed by: INTERNAL MEDICINE

## 2025-03-24 PROCEDURE — 97116 GAIT TRAINING THERAPY: CPT

## 2025-03-24 PROCEDURE — 94761 N-INVAS EAR/PLS OXIMETRY MLT: CPT

## 2025-03-24 PROCEDURE — 97530 THERAPEUTIC ACTIVITIES: CPT

## 2025-03-24 PROCEDURE — 99239 HOSP IP/OBS DSCHRG MGMT >30: CPT | Performed by: INTERNAL MEDICINE

## 2025-03-24 PROCEDURE — 6360000002 HC RX W HCPCS: Performed by: INTERNAL MEDICINE

## 2025-03-24 PROCEDURE — 97110 THERAPEUTIC EXERCISES: CPT

## 2025-03-24 PROCEDURE — 2580000003 HC RX 258: Performed by: INTERNAL MEDICINE

## 2025-03-24 PROCEDURE — 94660 CPAP INITIATION&MGMT: CPT

## 2025-03-24 PROCEDURE — 2060000000 HC ICU INTERMEDIATE R&B

## 2025-03-24 PROCEDURE — 94640 AIRWAY INHALATION TREATMENT: CPT

## 2025-03-24 PROCEDURE — 97535 SELF CARE MNGMENT TRAINING: CPT

## 2025-03-24 PROCEDURE — 92526 ORAL FUNCTION THERAPY: CPT

## 2025-03-24 PROCEDURE — 2500000003 HC RX 250 WO HCPCS: Performed by: INTERNAL MEDICINE

## 2025-03-24 RX ORDER — ZONISAMIDE 100 MG/1
100 CAPSULE ORAL
Qty: 30 CAPSULE | Refills: 0
Start: 2025-03-24

## 2025-03-24 RX ORDER — CLONIDINE HYDROCHLORIDE 0.1 MG/1
0.1 TABLET ORAL 2 TIMES DAILY
Qty: 60 TABLET | Refills: 1
Start: 2025-03-24

## 2025-03-24 RX ORDER — LACTULOSE 10 G/15ML
20 SOLUTION ORAL 3 TIMES DAILY
Qty: 60 ML | Refills: 0
Start: 2025-03-24

## 2025-03-24 RX ORDER — DILTIAZEM HCL 60 MG
60 TABLET ORAL EVERY 8 HOURS SCHEDULED
Qty: 90 TABLET | Refills: 0
Start: 2025-03-24

## 2025-03-24 RX ORDER — METOPROLOL TARTRATE 50 MG
50 TABLET ORAL 2 TIMES DAILY
Qty: 60 TABLET | Refills: 0
Start: 2025-03-24

## 2025-03-24 RX ORDER — OXYCODONE HYDROCHLORIDE 5 MG/1
5 TABLET ORAL EVERY 8 HOURS PRN
Qty: 6 TABLET | Refills: 0 | Status: SHIPPED | OUTPATIENT
Start: 2025-03-24 | End: 2025-03-26

## 2025-03-24 RX ORDER — QUETIAPINE FUMARATE 50 MG/1
50 TABLET, FILM COATED ORAL
Qty: 30 TABLET | Refills: 0
Start: 2025-03-24

## 2025-03-24 RX ADMIN — LACTULOSE 20 G: 10 SOLUTION ORAL at 20:21

## 2025-03-24 RX ADMIN — GABAPENTIN 200 MG: 250 SOLUTION ORAL at 10:42

## 2025-03-24 RX ADMIN — DILTIAZEM HYDROCHLORIDE 60 MG: 60 TABLET ORAL at 04:10

## 2025-03-24 RX ADMIN — THIAMINE HYDROCHLORIDE 100 MG: 100 INJECTION, SOLUTION INTRAMUSCULAR; INTRAVENOUS at 10:42

## 2025-03-24 RX ADMIN — PROCHLORPERAZINE MALEATE 10 MG: 10 TABLET ORAL at 04:10

## 2025-03-24 RX ADMIN — Medication 5000 UNITS: at 10:44

## 2025-03-24 RX ADMIN — SODIUM CHLORIDE, PRESERVATIVE FREE 10 ML: 5 INJECTION INTRAVENOUS at 20:28

## 2025-03-24 RX ADMIN — ALLOPURINOL 300 MG: 300 TABLET ORAL at 10:44

## 2025-03-24 RX ADMIN — OXYCODONE 5 MG: 5 TABLET ORAL at 04:09

## 2025-03-24 RX ADMIN — OXYCODONE 5 MG: 5 TABLET ORAL at 15:43

## 2025-03-24 RX ADMIN — DILTIAZEM HYDROCHLORIDE 60 MG: 60 TABLET ORAL at 15:43

## 2025-03-24 RX ADMIN — DILTIAZEM HYDROCHLORIDE 60 MG: 60 TABLET ORAL at 20:20

## 2025-03-24 RX ADMIN — ATORVASTATIN CALCIUM 40 MG: 40 TABLET, FILM COATED ORAL at 10:44

## 2025-03-24 RX ADMIN — QUETIAPINE FUMARATE 50 MG: 25 TABLET ORAL at 20:20

## 2025-03-24 RX ADMIN — ALBUTEROL SULFATE 2.5 MG: 0.83 SOLUTION RESPIRATORY (INHALATION) at 17:56

## 2025-03-24 RX ADMIN — GABAPENTIN 200 MG: 250 SOLUTION ORAL at 04:10

## 2025-03-24 RX ADMIN — Medication 4 ML: at 07:12

## 2025-03-24 RX ADMIN — GABAPENTIN 200 MG: 250 SOLUTION ORAL at 15:44

## 2025-03-24 RX ADMIN — CLONIDINE HYDROCHLORIDE 0.1 MG: 0.1 TABLET ORAL at 20:21

## 2025-03-24 RX ADMIN — CLONIDINE HYDROCHLORIDE 0.1 MG: 0.1 TABLET ORAL at 10:44

## 2025-03-24 RX ADMIN — ALBUTEROL SULFATE 2.5 MG: 0.83 SOLUTION RESPIRATORY (INHALATION) at 07:12

## 2025-03-24 RX ADMIN — ZONISAMIDE 100 MG: 25 CAPSULE ORAL at 20:21

## 2025-03-24 RX ADMIN — DULOXETINE HYDROCHLORIDE 30 MG: 30 CAPSULE, DELAYED RELEASE ORAL at 10:44

## 2025-03-24 RX ADMIN — GABAPENTIN 200 MG: 250 SOLUTION ORAL at 20:20

## 2025-03-24 RX ADMIN — Medication 4 ML: at 17:56

## 2025-03-24 RX ADMIN — ENOXAPARIN SODIUM 40 MG: 100 INJECTION SUBCUTANEOUS at 10:42

## 2025-03-24 RX ADMIN — Medication 15 ML: at 10:44

## 2025-03-24 RX ADMIN — PHENOL 1 SPRAY: 1.5 LIQUID ORAL at 04:43

## 2025-03-24 RX ADMIN — METOPROLOL TARTRATE 50 MG: 50 TABLET, FILM COATED ORAL at 10:44

## 2025-03-24 RX ADMIN — PANTOPRAZOLE SODIUM 40 MG: 40 INJECTION, POWDER, LYOPHILIZED, FOR SOLUTION INTRAVENOUS at 10:44

## 2025-03-24 RX ADMIN — METOPROLOL TARTRATE 50 MG: 50 TABLET, FILM COATED ORAL at 20:20

## 2025-03-24 ASSESSMENT — PAIN SCALES - GENERAL
PAINLEVEL_OUTOF10: 2
PAINLEVEL_OUTOF10: 0

## 2025-03-24 ASSESSMENT — PAIN DESCRIPTION - ORIENTATION
ORIENTATION: LEFT
ORIENTATION: RIGHT

## 2025-03-24 ASSESSMENT — PAIN DESCRIPTION - DESCRIPTORS
DESCRIPTORS: ACHING
DESCRIPTORS: STABBING

## 2025-03-24 ASSESSMENT — PAIN DESCRIPTION - FREQUENCY: FREQUENCY: INTERMITTENT

## 2025-03-24 ASSESSMENT — PAIN DESCRIPTION - PAIN TYPE: TYPE: CHRONIC PAIN

## 2025-03-24 ASSESSMENT — PAIN DESCRIPTION - LOCATION
LOCATION: ABDOMEN
LOCATION: KNEE

## 2025-03-24 ASSESSMENT — PAIN DESCRIPTION - ONSET: ONSET: ON-GOING

## 2025-03-24 ASSESSMENT — PAIN SCALES - WONG BAKER
WONGBAKER_NUMERICALRESPONSE: NO HURT
WONGBAKER_NUMERICALRESPONSE: NO HURT

## 2025-03-24 NOTE — PROGRESS NOTES
Bedside report and transfer of care given to Lakesha Núñez. Pt currently resting in bed with the call light within reach. Pt denies any other care needs at this time. Pt stable at this time.

## 2025-03-24 NOTE — FLOWSHEET NOTE
03/24/25 1201   Vital Signs   Temp 97.9 °F (36.6 °C)   Temp Source Oral   Pulse 82   Heart Rate Source Monitor   Respirations 16   /66   MAP (Calculated) 85   BP Location Left lower arm   BP Method Automatic   Patient Position Semi corwinwlers   Pain Assessment   Pain Assessment None - Denies Pain   Oxygen Therapy   SpO2 95 %   O2 Device Nasal cannula   O2 Flow Rate (L/min) 3 L/min     Pt arrived to unit in stable condition. He is a&o x4. Denies any pain or discomfort. Currently on 3L via nc. 4 eyes completed. No obvious skin issues noted. No edema noted. Lung sounds diminished. Ice chips given by OT. Call light and bedside table within reach.

## 2025-03-24 NOTE — PROGRESS NOTES
[facility]                            Critical care Progress Note :    CC: follow on RF post vent     Events of Last 24 hours:  On 4 L   No more nasal bleed   Now needed BiPAP 15/5 fr 6 hours   Secretions better   For peg tube   Has retention followed by Urology  Some agitation at night   S/P PEG tube by GI   No events    IV:   sodium chloride Stopped (03/21/25 0830)    sodium chloride      dextrose         Vitals:  Blood pressure 137/69, pulse 86, temperature 98 °F (36.7 °C), temperature source Temporal, resp. rate 15, height 1.702 m (5' 7.01\"), weight 80 kg (176 lb 5.9 oz), SpO2 96%.  on 6 L    EXAM:  General: not in distress.    Eyes: No sclera icterus. No conjunctival injection.  ENT: No discharge.   Neck: Trachea midline. Has possible knot chest couild be cyst to folow OP ,pt aware   Resp: No accessory muscle use. Few crackles. No wheezing. Few rhonchi.   CV: Regular  rate. Regular rhythm. No mumur or rub. No edema.   GI: Non-tender. Non-distended.   Skin: Warm and dry. No rash on exposed extremities.  M/S: No cyanosis. No clubbing.   Neuro: + following commands.  Good cough.   Psych: lethargic today        Scheduled Meds:   albuterol  2.5 mg Nebulization BID RT    cloNIDine  0.1 mg Oral BID    lactulose  20 g Oral TID    QUEtiapine  50 mg Oral QHS    zonisamide  100 mg Oral QHS    thiamine  100 mg IntraVENous Daily    sodium chloride (Inhalant)  4 mL Nebulization BID RT    Gabapentin  200 mg Per NG tube Q6H    DULoxetine  30 mg Oral Daily    CENTRUM/CERTA-KAYKAY with minerals oral  15 mL Oral Daily    dilTIAZem  60 mg Oral 3 times per day    metoprolol tartrate  50 mg Oral BID    polyethylene glycol  17 g Oral Daily    sodium chloride flush  5-40 mL IntraVENous 2 times per day    insulin lispro  0-16 Units SubCUTAneous Q4H    pantoprazole (PROTONIX) 40 mg in sodium chloride (PF) 0.9 % 10 mL injection  40 mg IntraVENous Daily    enoxaparin  40 mg SubCUTAneous

## 2025-03-24 NOTE — PROGRESS NOTES
Patient admitted to room 323 from ICU. Patient oriented to room, call light, bed rails, phone, lights and bathroom. Patient instructed about the schedule of the day including: vital sign frequency, lab draws, possible tests, frequency of MD and staff rounds, daily weights, I &O's and prescribed diet. Yes Telemetry box in place, patient aware of placement and reason. Bed locked, in lowest position, side rails up 2/4, call light within reach.        Recliner Assessment  Patient is not able to demonstrated the ability to move from a reclining position to an upright position within the recliner. however patient is alert, oriented and able to provide informed consent       4 Eyes Skin Assessment     NAME:  Sandor Early  YOB: 1955  MEDICAL RECORD NUMBER:  [id number]    The patient is being assessed for  Transfer to New Unit    I agree that at least one RN has performed a thorough Head to Toe Skin Assessment on the patient. ALL assessment sites listed below have been assessed.      Areas assessed by both nurses:    Head, Face, Ears, Shoulders, Back, Chest, Arms, Elbows, Hands, Sacrum. Buttock, Coccyx, Ischium, Legs. Feet and Heels, and Under Medical Devices         Scattered bruising.    Does the Patient have a Wound? No noted wound(s)       Abraham Prevention initiated by RN: No  Wound Care Orders initiated by RN: No    Pressure Injury (Stage 3,4, Unstageable, DTI, NWPT, and Complex wounds) if present, place Wound referral order by RN under : No    New Ostomies, if present place, Ostomy referral order under : No     Nurse 1 eSignature: Electronically signed by Elizabeth Molina RN on 3/24/25 at 1:18 PM EDT    **SHARE this note so that the co-signing nurse can place an eSignature**    Nurse 2 eSignature: {Esignature:[id number]}

## 2025-03-24 NOTE — PROGRESS NOTES
03/24/25 0013   NIV Type   Equipment Type v60   Mode Bilevel   Mask Type Full face mask   Mask Size Extra large   Assessment   Pulse 72   Respirations 18   SpO2 99 %   Settings/Measurements   IPAP 15 cmH20   CPAP/EPAP 5 cmH2O   Vt (Measured) 700 mL   Rate Ordered 18   FiO2  40 %   Minute Volume (L/min) 11.8 Liters   Mask Leak (lpm) 42 lpm   Patient's Home Machine No   Patient Observation   Patient Observations spo2 98% on 40% bipap

## 2025-03-24 NOTE — DISCHARGE SUMMARY
Name:  Sandor Early  Room:  /0323-01  MRN:    [id number]    Discharge Summary      This discharge summary is in conjunction with a complete physical exam done on the day of discharge.      Discharging Physician: JAROCHO SWIFT MD      Admit: 1/30/2025  Discharge:  3/24/2025     Diagnoses this Admission    Principal Problem:    Respiratory failure with hypoxia (HCC)  Active Problems:    Seizure-like activity (HCC)    Tobacco abuse    Severe protein-calorie malnutrition    Acute on chronic respiratory failure with hypoxia and hypercapnia (HCC)    Diabetes mellitus (HCC)    Acute respiratory failure (HCC)    Alcohol use disorder    Hypomagnesemia    SBO (small bowel obstruction) (HCC)    Toxic metabolic encephalopathy    Hypernatremia    Disorder of electrolytes    Ileus (HCC)    Agitation    Chest pain    Alcohol withdrawal syndrome, with delirium (HCC)    Pulmonary congestion    Acute kidney injury    Tracheobronchitis    Enteritis    Acute metabolic encephalopathy    Acute encephalopathy    Delirium due to another medical condition, persistent, hyperactive    Painful diabetic neuropathy (HCC)  Resolved Problems:    Influenza A          Procedures (Please Review Full Report for Details)      Consults    IP CONSULT TO PULMONOLOGY  IP CONSULT TO CRITICAL CARE  IP CONSULT TO PULMONOLOGY  IP CONSULT TO PHARMACY  IP CONSULT TO GENERAL SURGERY  IP CONSULT TO NEUROLOGY  IP CONSULT TO CARDIOLOGY  IP CONSULT TO VASCULAR ACCESS TEAM  IP CONSULT TO VASCULAR ACCESS TEAM  IP CONSULT TO DIETITIAN  IP CONSULT TO PHARMACY  IP CONSULT TO DIETITIAN  IP CONSULT TO SOCIAL WORK  IP CONSULT TO GI  IP CONSULT TO DIETITIAN  IP CONSULT TO PHARMACY  PHARMACY TO DOSE VANCOMYCIN  IP CONSULT TO DIETITIAN  IP CONSULT TO NEUROLOGY  IP CONSULT TO DIETITIAN  IP CONSULT TO VASCULAR ACCESS TEAM  IP CONSULT TO VASCULAR ACCESS TEAM  IP CONSULT TO PALLIATIVE CARE  IP CONSULT TO VASCULAR ACCESS TEAM  IP CONSULT TO NEUROLOGY  IP CONSULT  TO VASCULAR ACCESS TEAM  IP CONSULT TO PSYCHIATRY  IP CONSULT TO GI  IP CONSULT TO UROLOGY      HPI:    Sandor Early is a 69 y.o. male with pmh of COPD,CRF on 4L, gastroesophageal reflux disease, hyperlipidemia, alcoholic liver cirrhosis who presented at [facility] ED for shortness of breath which has been going on for weeks but over the past few days has gotten worse patient was taken to the ED via EMS and upon arrival was in severe respiratory distress saturating 88%.  He did receive steroids and DuoNebs in the ED and initiated on BiPAP 16/8 as well as given 10 mg of morphine and subsequently transferred to ICU for further management.  Patient arrived in the ICU on BiPAP alert and oriented x 3 mildly tachypneic.  With transition to oxygen by nasal cannulae sats stayed at 97%.  Patient also tested positive for influenza A and was started on Tamiflu.       Hospital Course      Acute on chronic hypoxic and hypercarbic resp failure   Sec to Influenza A and COPD exacerbation  -Placed on BiPAP and admitted to the ICU  -Used BiPAP on admission and improved some initially  -Pred taper -and tamiflu completed      Resp failure worsened with development of SBO and required vent support   -Over the course of his hospitalization he has received vancomycin, Zosyn, Diflucan, Z-Zev and Tamiflu.   sputum with MRSA    - repeat steroids , HHn given- improved slowly and now off vent   -pulmonary managing   -He was extubated on 3/4/2025.  -On prn Haldol. Using Bipap as needed. -Mentation some improved.  Modified barium swallow done.  NG tube removed for now.  NG removed so that he would have more success with modified barium swallow.   biPAP at night   Now on 4 L   -Family has agreed for PEG. US ordered to assess ascites to see if PEG can be placed. PEG placed on 3/21/25.  -Psychiatry consulted given continued agitation.  Psychiatrist note reviewed. Mentation much improved.     Enteritis with partial SBO resolved  -Ct with

## 2025-03-24 NOTE — PROGRESS NOTES
Inpatient Physical Therapy Treatment    Unit: ICU  Date:  3/24/2025  Patient Name:    Sandor Early  Admitting diagnosis:  Hypomagnesemia [E83.42]  Influenza A [J10.1]  COPD exacerbation (HCC) [J44.1]  Respiratory failure with hypoxia [J96.91]  Alcohol use disorder [F10.90]  Acute on chronic respiratory failure with hypoxia and hypercapnia [J96.21, J96.22]  Acute respiratory failure [J96.00]  Admit Date:  1/30/2025  Precautions/Restrictions/WB Status/ Lines/ Wounds/ Oxygen: Fall risk, Bed/chair alarm, Lines (Supplemental O2 (4L), internal catheter, and PEG tube), Telemetry, Continuous pulse oximetry, and Isolation Precautions: Contact    Treatment Time:  12:25-13:09  Treatment Number:  4   Timed Code Treatment Minutes: 44 minutes  Total Treatment Minutes:  44  minutes    Patient Stated Goals for Therapy: \" get back in bed \"          Discharge Recommendations: SNF  DME needs for discharge: Defer to facility       Therapy recommendation for EMS Transport: requires transport by cot due to pt needs lift equipment for safe transfers    Therapy recommendations for staff:   Assist of 1 for transfers with use of VALERY STEDY and gait belt to/from chair    History of Present Illness: per H&P   Sandor is a 69 y.o. male with a history of COPD on 4 L of O2 chronically, GERD, hyperlipidemia, alcohol liver cirrhosis, who presents to the emergency department by EMS for shortness of breath.  Patient reports that he has felt short of breath with increasing cough and fever over the past 3 to 4 days.  Upon EMS arrival patient was in moderate to severe respiratory distress O2 sat 88%.  He was placed on O2.  He received Solu-Medrol and DuoNeb.  EMS team stated that he was agitated trying to get out of the squad and complaining of diffuse pain back legs etc. he has a history of neuropathy.  They then gave him 10 mg of morphine.  Upon arrival patient is ill-appearing in respiratory distress poor skin color very tachypneic

## 2025-03-24 NOTE — PROGRESS NOTES
Speech Language Pathology  Swallowing Disorders and Dysphagia    Dysphagia Treatment/Follow-Up Note  Facility/Department: [facility]    Recommendations:  Low volume pleasure feeds of puree (IDDSI 4) with SLP/RN only (oral care must be completed prior); Ice chips with SLP/RN only (oral care must be completed prior); Meds via alternate route   Strongly recommend intensive dysphagia therapy incorporate actual swallowing of food/drink items to increase potential for return to po intake. Consider MDTP.  Risk Management: upright for all intake, stay upright for at least 30 mins after intake, small bites/sips, total feed, 1:1 supervision with intake, oral care 2-3x/day to reduce adverse affects in the event of aspiration, increase physical mobility as able, slow rate of intake, general GERD precautions, STRICT aspiration precautions, and hold PO and contact SLP if s/s of aspiration or worsening respiratory status develop.     NAME:Sandor Early  : 1955 (69 y.o.)   MRN: [id number]  ROOM: [place] [id number]  ADMISSION DATE: 2025  PATIENT DIAGNOSIS(ES): Hypomagnesemia [E83.42]  Influenza A [J10.1]  COPD exacerbation (HCC) [J44.1]  Respiratory failure with hypoxia [J96.91]  Alcohol use disorder [F10.90]  Acute on chronic respiratory failure with hypoxia and hypercapnia [J96.21, J96.22]  Acute respiratory failure [J96.00]  Allergies   Allergen Reactions    Lisinopril Swelling and Other (See Comments)    Ace Inhibitors Swelling and Other (See Comments)    Influenza Vaccines Other (See Comments)     He states he was hospitalized when he received his first flu vaccine    Tiotropium Bromide Monohydrate Swelling and Other (See Comments)     Tolerates both tudorza and incruse  On Trelegy.    Vilanterol     Diazepam Anxiety       DATE ONSET: 2025    Pain: The patient complains of pain 2/10 right knee; RN aware and giving pain meds       Current Diet: Diet NPO  ADULT TUBE FEEDING; PEG; Diabetic; Continuous;

## 2025-03-24 NOTE — PROGRESS NOTES
Progress Note    Admit Date:  1/30/2025    Interval history    Admitted for flu, copd exacerbation, course complicated with development of sbo, resp failure needing vent support     Tolerated pressure support 5/5 for 6 hours yesterday.  Plans for spontaneous breathing trial and possible extubation today.  On Precedex.    3/5-extubated on 3/4/2025.  Placed on BiPAP.  Has been intermittently confused.  Was on Precedex.  Has had some intermittent agitation.  When I saw the patient his eyes are open.   at bedside.  He has a good cough.    3/6- more calm then yesterday. On Bipap. Failed speech eval but they will see him again. Responding nicely to verbal commands,    3/7- more calm having thick secretions. ST has recommended MBS. He is NPO. Used Bipap last night. BP elevated today.    3/9- MR singer seen awake, but confused, restless was given seroquel for agitation. Confused and not directable only for few minutes and gets restless, throwing stuff  Was on precedex overnight but now off    3/10  Continues to be disoriented   Recd haldol yesterday  Now in restraints  Used BiPAP at night   Now on 5 L     3/11  Disoriented   Off restraints now   biPAP for 4-5 hours last night   On 5 L now     3/12   Continues to be confused   On BiPAP at night   5 L now     3/13  On BiPAP  Restless,disoriented     3/14   On BIPAP all night  Now on 4 L   Confused     3/15   On Bipap at night  Now on 4 L     3/16   Bipap at night   Now on 4 L     3/17- intermittently agitated. Sitter at bedside. A little more calm this am and answering some questions when I talked to him.     3/18-more calm this morning but may be just waking up from his sleep.  Speech saw the patient did not regain swallow.  Recommended meds to be crushed and given.  Family considering alternative modes of feeding-PEG tube.  GI consulted.  Psychiatry has seen patient.      3/19-he is more calm today but has.  The confusion and agitation.  Family has agreed  evidence of laryngeal penetration or tracheal aspiration of honey   thickened liquids or purees.   Please see separate speech pathology report for full discussion of findings   and recommendations.         XR ABDOMEN FOR NG/OG/NE TUBE PLACEMENT   Final Result   Enteric tube tip and side port within the stomach.         XR CHEST PORTABLE   Final Result   Slightly improved perihilar opacities centrally.         XR CHEST PORTABLE   Final Result   1. Slight interval worsening of a right base opacity.   2. Endotracheal tube and nasogastric tube in place.         XR CHEST PORTABLE   Final Result   1. Stable lines and tubes.   2. Stable bilateral airspace opacities.         XR CHEST PORTABLE   Final Result   Stable appearance of the chest and support apparatus.         XR CHEST PORTABLE   Final Result   No evidence of acute cardiopulmonary process.  Stable positioning of support   apparatus.         Vascular duplex upper extremity venous bilateral   Final Result      XR CHEST PORTABLE   Final Result   Minimal vascular congestion with a small left pleural effusion.         XR CHEST PORTABLE   Final Result   No evidence of acute cardiopulmonary process.         XR CHEST PORTABLE   Final Result   No significant finding in the chest.         XR ABDOMEN FOR NG/OG/NE TUBE PLACEMENT   Final Result   Nasogastric tube with tip overlying gastric antrum.         XR CHEST PORTABLE   Final Result   1.  Lines and tubes as above.  The NG side port is near the GE junction and   could be advanced by 5-10 cm.      2.  Interstitial opacities in both lower lungs are redemonstrated.         XR CHEST PORTABLE   Final Result   Bibasilar opacities are redemonstrated.      Endotracheal tube, nasogastric tube, and left jugular line are stable.         XR CHEST PORTABLE   Final Result   Increasing basilar opacities may reflect developing infiltrate/effusions.   Study is somewhat limited secondary to rotation but no other significant   change

## 2025-03-24 NOTE — PROGRESS NOTES
Attempted to call report to [facility]. Got transferred 3 times with no answer.

## 2025-03-24 NOTE — CARE COORDINATION
DISCHARGE ORDER  Date/Time 3/24/2025 3:34 PM  Completed by: Teresa Boeck, RN, Case Management    Patient Name: Sandor Early    : 1955      Admit order Date and Status: 2025  Noted discharge order. (verify MD's last order for status of admission/Traditional Medicare 3 [place] Inpatient qualifying stay required for SNF)    Confirmed discharge plan with:              Patient:  Yes              When pt confirms DC plan does any support person need to be contacted by CM Yes if yes who Catia/daughter                      Discharge to Facility: [facility]   Facility phone number for staff giving report: [phone]   Pre-certification completed: yes   Hospital Exemption Notification (HENS) completed: yes   Discharge orders and Continuity of Care faxed to facility:  [facility]      Transportation:               Medical Transport explained with choice list offered to pt/family.                Choice:No(no preference)  Agency used: [facility]   time:   20:45      Pt/family/Nursing/Facility aware of  time:  Yes Names: Lali MEEKS, patient, Catia/daughter, HUC/paperwork  Ambulance form completed:  yes:      Date Last IMM Given: 3/24/2025    Comments: Pt is being d/c'd to [facility] today. Pt's O2 sats are 95% on 3L. Discharge timeout done with Lali MEEKS. All discharge needs and concerns addressed.    Discharging nurse to complete TAMARA, reconcile AVS, and place final copy with patient's discharge packet. Discharging RN to ensure that written prescriptions for Level II medications are sent with patient to the facility as per protocol.

## 2025-03-24 NOTE — PROGRESS NOTES
Inpatient Occupational Therapy Treatment    Unit: ICU  Date:  3/24/2025  Patient Name:    Sandor Early  Admitting diagnosis:  Hypomagnesemia [E83.42]  Influenza A [J10.1]  COPD exacerbation (HCC) [J44.1]  Respiratory failure with hypoxia [J96.91]  Alcohol use disorder [F10.90]  Acute on chronic respiratory failure with hypoxia and hypercapnia [J96.21, J96.22]  Acute respiratory failure [J96.00]  Admit Date:  1/30/2025  Precautions/Restrictions/WB Status/ Lines/ Wounds/ Oxygen: Fall risk, Bed/chair alarm, Lines (IV, Supplemental O2 (4L), and internal catheter), Telemetry, Continuous pulse oximetry, Telesitter, Isolation Precautions: Contact, and no BP on RUE.     Treatment Time:  12:10-12:50  Treatment Number:  6  Timed Code Treatment Minutes: 40 minutes  Total Treatment Minutes:  40  minutes    Patient Goals for Therapy: \"to get better\"          Discharge Recommendations: SNF  DME needs for discharge: Defer to facility       Therapy recommendations for staff:   Assist of 1 for transfers with use of VALERY STEDY and gait belt to/from BSC  to/from chair    History of Present Illness:Sandor is a 69 y.o. male with a history of COPD on 4 L of O2 chronically, GERD, hyperlipidemia, alcohol liver cirrhosis, who presents to the emergency department by EMS for shortness of breath.  Patient reports that he has felt short of breath with increasing cough and fever over the past 3 to 4 days.  Upon EMS arrival patient was in moderate to severe respiratory distress O2 sat 88%.  He was placed on O2.  He received Solu-Medrol and DuoNeb.  EMS team stated that he was agitated trying to get out of the squad and complaining of diffuse pain back legs etc. he has a history of neuropathy.  They then gave him 10 mg of morphine.  Upon arrival patient is ill-appearing in respiratory distress poor skin color very tachypneic retractions.  He denies chest pain.     Patient has a history of alcohol use disorder.  He tells me he now only drinks 1

## 2025-03-24 NOTE — PROGRESS NOTES
Report given to PCU RN.   Meds given via PEG and pt tolerated well. Pt absolutely refusing lactulose and glycolax as he is worried about having a bowel movement in transport. Pt states if he does not go to rehab today that he will consider taking it later today. PCU RN aware.    Pt remains on 3L NC.     Pt calling and updating his sister this morning via his personal cell phone.

## 2025-03-24 NOTE — PROGRESS NOTES
At this time pt resting, but did wake when MD was in room rounding this morning. Pt states he had not slept all night well and wants to rest.  Pt anxious to know if the is leaving today and asked that we let him know as soon as we find out.

## 2025-03-24 NOTE — CARE COORDINATION
Spoke with Andria. She will check on precert this morning.     Per Andria James requested updated OT/PT notes.  Andria sent OT note from 3/22.    CM spoke to PT. They will see patient today.  Updated Andria that new PT recs are in the patient's chart.

## 2025-03-24 NOTE — PLAN OF CARE
Problem: Chronic Conditions and Co-morbidities  Goal: Patient's chronic conditions and co-morbidity symptoms are monitored and maintained or improved  3/23/2025 2111 by Dulce Nickerson RN  Outcome: Progressing  3/23/2025 1724 by Geri Tong RN  Outcome: Progressing     Problem: Discharge Planning  Goal: Discharge to home or other facility with appropriate resources  3/23/2025 2111 by Dulce Nickerson RN  Outcome: Progressing  3/23/2025 1724 by Geri Tong RN  Outcome: Progressing     Problem: Safety - Adult  Goal: Free from fall injury  3/23/2025 2111 by Dulce Nickerson RN  Outcome: Progressing  3/23/2025 1724 by Geri Tong RN  Outcome: Progressing     Problem: Pain  Goal: Verbalizes/displays adequate comfort level or baseline comfort level  3/23/2025 2111 by Dulce Nickerson RN  Outcome: Progressing  3/23/2025 1724 by Geri Tong RN  Outcome: Progressing     Problem: Respiratory - Adult  Goal: Achieves optimal ventilation and oxygenation  3/23/2025 2111 by Dulce Nickerson RN  Outcome: Progressing  3/23/2025 1724 by Geri Tong RN  Outcome: Progressing     Problem: Cardiovascular - Adult  Goal: Maintains optimal cardiac output and hemodynamic stability  3/23/2025 2111 by Dulce Nickerson RN  Outcome: Progressing  3/23/2025 1724 by Geri Tong RN  Outcome: Progressing  Goal: Absence of cardiac dysrhythmias or at baseline  3/23/2025 1724 by Geri Tong RN  Outcome: Progressing     Problem: Skin/Tissue Integrity - Adult  Goal: Skin integrity remains intact  Description: 1.  Monitor for areas of redness and/or skin breakdown  2.  Assess vascular access sites hourly  3.  Every 4-6 hours minimum:  Change oxygen saturation probe site  4.  Every 4-6 hours:  If on nasal continuous positive airway pressure, respiratory therapy assess nares and determine need for appliance change or resting period  3/23/2025 2111 by Dulce Nickerson RN  Outcome:

## 2025-03-25 LAB
ACID FAST STN SPEC QL: NORMAL
MYCOBACTERIUM SPEC CULT: NORMAL

## 2025-03-25 NOTE — FLOWSHEET NOTE
03/24/25 2322   Vital Signs   Temp 98.3 °F (36.8 °C)   Temp Source Oral   Pulse 72   Heart Rate Source Monitor   Respirations 18   /75   MAP (Calculated) 96   BP Location Left upper arm   BP Method Automatic   Patient Position Semi fowlers   Pain Assessment   Pain Assessment None - Denies Pain   Oxygen Therapy   SpO2 99 %   Pulse Oximetry Type Intermittent   Pulse Oximeter Device Mode Intermittent   Pulse Oximeter Device Location Right;Finger   O2 Device High flow nasal cannula   Skin Assessment Clean, dry, & intact   O2 Flow Rate (L/min) 5 L/min   Oxygen Therapy Supplemental oxygen

## 2025-03-25 NOTE — FLOWSHEET NOTE
03/24/25 2000   Vital Signs   Temp 97.8 °F (36.6 °C)   Temp Source Oral   Pulse 75   Heart Rate Source Monitor   Respirations 18   /76   MAP (Calculated) 97   BP Location Left upper arm   BP Method Automatic   Patient Position Semi corwinwlers   Pain Assessment   Pain Assessment None - Denies Pain   Care Plan - Pain Goals   Verbalizes/displays adequate comfort level or baseline comfort level Encourage patient to monitor pain and request assistance   Opioid-Induced Sedation   POSS Score 1   RASS   Hernandez Agitation Sedation Scale (RASS) 0   Oxygen Therapy   SpO2 100 %   Pulse Oximetry Type Intermittent   Pulse Oximeter Device Mode Intermittent   Pulse Oximeter Device Location Right;Finger   O2 Device High flow nasal cannula   Skin Assessment Clean, dry, & intact   O2 Flow Rate (L/min) 5 L/min   Oxygen Therapy Supplemental oxygen     Pt resting comfortably in bed. Bed alarm on, call light within reach. No needs expressed at this time. Will continue to monitor.

## 2025-03-27 ENCOUNTER — TELEPHONE (OUTPATIENT)
Dept: FAMILY MEDICINE CLINIC | Age: 70
End: 2025-03-27

## 2025-03-27 NOTE — TELEPHONE ENCOUNTER
[facility] Transitions Initial Follow Up Call  Outreach made within 2 business days of discharge: Yes  Patient: Sandor Early Patient : 1955   MRN: [id number]  Reason for Admission: Respiratory failure with hypoxia   Discharge Date: 3/25/25     Spoke with: [facility]  [phone].  (2) [facility]    Discharge department/facility: [place]  25 -3/25/25  Non-face-to-face services provided:  Obtained and reviewed discharge summary and/or continuity of care documents  TCM Interactive Patient Contact:  Was patient able to fill all prescriptions: Yes  Was patient instructed to bring all medications to the follow-up visit: Yes  Is patient taking all medications as directed in the discharge summary? Yes  Does patient understand their discharge instructions: Yes  Does patient have questions or concerns that need addressed prior to 7-14 day follow up office visit: no  Patient was not Scheduled appointment with PCP .  He is at [facility] [phone]    Follow Up  No future appointments.    KRYSTLE LEIGH RN

## 2025-04-01 LAB
ACID FAST STN SPEC QL: NORMAL
MYCOBACTERIUM SPEC CULT: NORMAL

## 2025-04-03 ENCOUNTER — TRANSCRIBE ORDERS (OUTPATIENT)
Dept: ADMINISTRATIVE | Age: 70
End: 2025-04-03

## 2025-04-03 DIAGNOSIS — R13.12 OROPHARYNGEAL DYSPHAGIA: Primary | ICD-10-CM

## 2025-04-07 ENCOUNTER — HOSPITAL ENCOUNTER (OUTPATIENT)
Dept: GENERAL RADIOLOGY | Age: 70
Discharge: HOME | End: 2025-04-07
Payer: MEDICARE

## 2025-04-07 ENCOUNTER — HOSPITAL ENCOUNTER (OUTPATIENT)
Dept: SPEECH THERAPY | Age: 70
Setting detail: THERAPIES SERIES
Discharge: HOME | End: 2025-04-07
Payer: MEDICARE

## 2025-04-07 DIAGNOSIS — R13.12 OROPHARYNGEAL DYSPHAGIA: ICD-10-CM

## 2025-04-07 PROCEDURE — 74230 X-RAY XM SWLNG FUNCJ C+: CPT

## 2025-04-07 PROCEDURE — 92611 MOTION FLUOROSCOPY/SWALLOW: CPT

## 2025-04-07 NOTE — PROCEDURES
SPEECH/LANGUAGE PATHOLOGY  Swallowing Disorders and Dysphagia  VIDEOFLUOROSCOPIC STUDY OF SWALLOWING (VFSS) / Modified Barium Swallow Study (MBSS)  Facility/Department: [facility] SPEECH THERAPY    PATIENT NAME:  Sandor Early      :  1955    Room: Room/bed info not found   TODAY'S DATE:  2025    [x]The admitting diagnosis and active problem list, as listed below have been reviewed prior to initiation of this evaluation.     ADMITTING DIAGNOSIS: Oropharyngeal dysphagia [R13.12]     ACTIVE PROBLEM LIST:   Patient Active Problem List   Diagnosis    Seizure-like activity (HCC)    Chronic obstructive pulmonary disease (HCC)    Tobacco abuse    GERD (gastroesophageal reflux disease)    Osteoarthritis of multiple joints    Alcohol abuse    Hepatomegaly    Chronic alcoholic hepatitis (HCC)    Chronic pain    Mixed simple and mucopurulent chronic bronchitis (HCC)    Mixed hyperlipidemia    Severe protein-calorie malnutrition    Bronchiectasis without complication (HCC)    Onychomycosis    Tinea pedis of both feet    Chronic idiopathic constipation    Alcoholic cirrhosis of liver without ascites (HCC)    Diverticulosis of intestine without bleeding    Secondary esophageal varices without bleeding (HCC)    Erosive gastritis    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)    Normocytic anemia    AVM (arteriovenous malformation) of small bowel, acquired    Benign essential HTN    Primary osteoarthritis of right knee    Venous insufficiency    PAD (peripheral artery disease)    Diabetic polyneuropathy associated with type 2 diabetes mellitus    Situational anxiety    Gastritis    Chronic respiratory failure with hypoxia    Primary hypertension    Current smoker    Diastasis recti    Hypercholesterolemia    Callus of foot    Bilateral lower leg cellulitis    Orthostatic hypotension    COVID    Acute on chronic respiratory failure with hypoxia and hypercapnia    COPD exacerbation (HCC)    Dyspnea    Closed fracture

## 2025-04-08 LAB
ACID FAST STN SPEC QL: NORMAL
MYCOBACTERIUM SPEC CULT: NORMAL

## 2025-05-02 RX ORDER — CALCIUM CITRATE/VITAMIN D3 200MG-6.25
TABLET ORAL
Qty: 50 EACH | Refills: 0 | Status: SHIPPED | OUTPATIENT
Start: 2025-05-02

## 2025-05-02 NOTE — TELEPHONE ENCOUNTER
Refill Request     CONFIRM preferrred pharmacy with the patient.    If Mail Order Rx - Pend for 90 day refill.      Last Seen: Last Seen Department: 1/28/2025  Last Seen by PCP: 1/28/2025    Last Written: 8.8.24    If no future appointment scheduled, route STAFF MESSAGE with patient name to the  Pool for scheduling.      Next Appointment:   Future Appointments   Date Time Provider Department Center   5/9/2025  1:20 PM [facility]       Message sent to  to schedule appt with patient?  N/A      Requested Prescriptions     Pending Prescriptions Disp Refills    TRUE METRIX BLOOD GLUCOSE TEST strip [Pharmacy Med Name: TRUE METRIX SELF MONITORING BLOOD GLUCOSE STRIPS GLUCOSE STRIP] 50 each 0     Sig: USE TWICE DAILY

## 2025-05-09 ENCOUNTER — OFFICE VISIT (OUTPATIENT)
Dept: FAMILY MEDICINE CLINIC | Age: 70
End: 2025-05-09

## 2025-05-09 VITALS
OXYGEN SATURATION: 96 % | WEIGHT: 188 LBS | HEART RATE: 63 BPM | BODY MASS INDEX: 29.44 KG/M2 | DIASTOLIC BLOOD PRESSURE: 78 MMHG | SYSTOLIC BLOOD PRESSURE: 126 MMHG | RESPIRATION RATE: 17 BRPM

## 2025-05-09 DIAGNOSIS — J96.11 CHRONIC RESPIRATORY FAILURE WITH HYPOXIA (HCC): Primary | ICD-10-CM

## 2025-05-09 DIAGNOSIS — H53.9 VISION CHANGES: ICD-10-CM

## 2025-05-09 DIAGNOSIS — I85.10 SECONDARY ESOPHAGEAL VARICES WITHOUT BLEEDING (HCC): ICD-10-CM

## 2025-05-09 DIAGNOSIS — Z93.1 STATUS POST INSERTION OF PERCUTANEOUS ENDOSCOPIC GASTROSTOMY (PEG) TUBE (HCC): ICD-10-CM

## 2025-05-09 DIAGNOSIS — I50.32 CHRONIC DIASTOLIC (CONGESTIVE) HEART FAILURE (HCC): ICD-10-CM

## 2025-05-09 DIAGNOSIS — R52 PAIN MANAGEMENT: ICD-10-CM

## 2025-05-09 DIAGNOSIS — I10 PRIMARY HYPERTENSION: ICD-10-CM

## 2025-05-09 NOTE — PROGRESS NOTES
time.     This document was prepared by a combination of typing and transcription through a voice recognition software.     All medications have the potential for adverse effects. All medications effect each person differently. Please read and review provided information related to medication. If the medication that you have been prescribed has the potential to cause sedation, do not drive or operate car, truck, or heavy machinery until you know how the medication will effect you. If you experience any adverse effects from the medication, please call the office or report to the emergency department.    Juliet Mayorga MD  5/9/25

## 2025-05-16 ENCOUNTER — TELEPHONE (OUTPATIENT)
Dept: FAMILY MEDICINE CLINIC | Age: 70
End: 2025-05-16

## 2025-05-16 DIAGNOSIS — H25.9 AGE-RELATED CATARACT OF BOTH EYES, UNSPECIFIED AGE-RELATED CATARACT TYPE: Primary | ICD-10-CM

## 2025-05-16 NOTE — TELEPHONE ENCOUNTER
Pt is wanting to see if he can get a referral to [facility] to get his cataract removed and can fax it to [phone].

## 2025-05-21 ENCOUNTER — TELEPHONE (OUTPATIENT)
Dept: FAMILY MEDICINE CLINIC | Age: 70
End: 2025-05-21

## 2025-05-21 DIAGNOSIS — H53.9 VISION CHANGES: Primary | ICD-10-CM

## 2025-05-21 NOTE — TELEPHONE ENCOUNTER
----- Message from Yazmin JAIN sent at 5/21/2025 11:02 AM EDT -----  Regarding: ECC Referral Request  ECC Referral Request    Reason for referral request: Lab/Test Order    Specialist/Lab/Test patient is requesting (if known):To get his eye check     Specialist Phone Number (if applicable):    Additional Information Patient needs a referral on [facility] in [place]  --------------------------------------------------------------------------------------------------------------------------    Relationship to Patient: Self     Call Back Information: OK to leave message on voicemail  Preferred Call Back Number: Phone [phone]

## 2025-05-29 DIAGNOSIS — E55.9 VITAMIN D DEFICIENCY: ICD-10-CM

## 2025-05-29 RX ORDER — FLUTICASONE PROPIONATE AND SALMETEROL 250; 50 UG/1; UG/1
POWDER RESPIRATORY (INHALATION)
Qty: 60 EACH | Refills: 0 | Status: SHIPPED | OUTPATIENT
Start: 2025-05-29

## 2025-05-29 RX ORDER — METOPROLOL TARTRATE 50 MG
50 TABLET ORAL 2 TIMES DAILY
Qty: 60 TABLET | Refills: 5 | Status: SHIPPED | OUTPATIENT
Start: 2025-05-29

## 2025-05-29 RX ORDER — CALCIUM CARBONATE 500 MG/1
TABLET, CHEWABLE ORAL
Qty: 150 TABLET | Refills: 5 | Status: SHIPPED | OUTPATIENT
Start: 2025-05-29

## 2025-05-29 RX ORDER — CLONIDINE HYDROCHLORIDE 0.1 MG/1
0.1 TABLET ORAL 2 TIMES DAILY
Qty: 60 TABLET | Refills: 5 | Status: SHIPPED | OUTPATIENT
Start: 2025-05-29

## 2025-05-29 RX ORDER — QUETIAPINE FUMARATE 50 MG/1
50 TABLET, FILM COATED ORAL DAILY
Qty: 30 TABLET | Refills: 5 | Status: SHIPPED | OUTPATIENT
Start: 2025-05-29

## 2025-05-29 RX ORDER — ONDANSETRON 4 MG/1
4 TABLET, FILM COATED ORAL EVERY 6 HOURS PRN
Qty: 20 TABLET | Refills: 5 | Status: SHIPPED | OUTPATIENT
Start: 2025-05-29

## 2025-05-29 RX ORDER — ZONISAMIDE 100 MG/1
100 CAPSULE ORAL NIGHTLY
Qty: 30 CAPSULE | Refills: 5 | Status: SHIPPED | OUTPATIENT
Start: 2025-05-29

## 2025-05-29 RX ORDER — DILTIAZEM HCL 60 MG
TABLET ORAL
Qty: 90 TABLET | Refills: 5 | Status: SHIPPED | OUTPATIENT
Start: 2025-05-29

## 2025-05-29 NOTE — TELEPHONE ENCOUNTER
Refill Request     CONFIRM preferrred pharmacy with the patient.    If Mail Order Rx - Pend for 90 day refill.      Last Seen: Last Seen Department: 5/9/2025  Last Seen by PCP: 5/9/2025    Last Written: unknown - do not see this specifically in med list hx     If no future appointment scheduled, route STAFF MESSAGE with patient name to the  Pool for scheduling.      Next Appointment:   Future Appointments   Date Time Provider Department Center   8/15/2025  2:40 PM Juliet Mayorga MD [facility]   9/25/2025  9:15 AM Todd Joyce MD [facility]       Message sent to  to schedule appt with patient?  N/A      Requested Prescriptions     Pending Prescriptions Disp Refills    fluticasone-salmeterol (ADVAIR) 250-50 MCG/ACT AEPB diskus inhaler [Pharmacy Med Name: FLUTICASONE PROPIONATE/SALMETEROL 250/50 AERO POW BR ACT] 60 each 0     Sig: INHALE ONE (1) PUFF BY MOUTH TWICE DAILY

## 2025-05-29 NOTE — TELEPHONE ENCOUNTER
Refill Request     CONFIRM preferrred pharmacy with the patient.    If Mail Order Rx - Pend for 90 day refill.      Last Seen: Last Seen Department: 5/9/2025  Last Seen by PCP: 5/9/2025    Last Written: 3-    If no future appointment scheduled, route STAFF MESSAGE with patient name to the  Pool for scheduling.      Next Appointment:   Future Appointments   Date Time Provider Department Center   8/15/2025  2:40 PM [facility]   9/25/2025  9:15 AM Todd Joyce MD [facility]       Message sent to  to schedule appt with patient?  N/A      Requested Prescriptions     Pending Prescriptions Disp Refills    zonisamide (ZONEGRAN) 100 MG capsule [Pharmacy Med Name: ZONISAMIDE 100MG CAPSULE] 30 capsule 5     Sig: TAKE 1 CAPSULE BY MOUTH EVERY NIGHT    cloNIDine (CATAPRES) 0.1 MG tablet [Pharmacy Med Name: CLONIDINE HYDROCHLORIDE 0.1MG TABLET] 60 tablet 5     Sig: TAKE 1 TABLET BY MOUTH TWICE DAILY    dilTIAZem (CARDIZEM) 60 MG immediate release tablet [Pharmacy Med Name: DILTIAZEM HYDROCHLORIDE 60MG TABLET] 90 tablet 5     Sig: TAKE 1 CAPSULE BY MOUTH THREE (3) TIMES DAILY    ANTACID CALCIUM 500 MG chewable tablet [Pharmacy Med Name: ANTACID CALCIUM REGULAR STRENGTH 500MG TABLET CHEWABLE] 150 tablet 5     Sig: TAKE ONE (1) TABLET EVERY 8 HOURS AS NEEDED FOR HEARTBURN    metoprolol tartrate (LOPRESSOR) 50 MG tablet [Pharmacy Med Name: METOPROLOL TARTRATE 50MG TABLET] 60 tablet 5     Sig: TAKE 1 TABLET BY MOUTH TWICE DAILY    QUEtiapine (SEROQUEL) 50 MG tablet [Pharmacy Med Name: QUETIAPINE FUMARATE 50MG TABLET] 30 tablet 5     Sig: TAKE 1 TABLET BY MOUTH EVERY DAY    VITAMIN D3 125 MCG (5000 UT) CAPS capsule [Pharmacy Med Name: VITAMIN D3 5000UNIT CAPSULE] 30 capsule 5     Sig: TAKE 1 CAPSULE BY MOUTH EVERY IN THE MORNING    ondansetron (ZOFRAN) 4 MG tablet [Pharmacy Med Name: ONDANSETRON HYDROCHLORIDE 4MG TABLET] 20 tablet 5     Sig: TAKE 1 TABLET BY MOUTH EVERY

## 2025-06-23 NOTE — TELEPHONE ENCOUNTER
Still having side pain--no burning with urination, no frequent urination. Went ahead and made an appointment as he was not seen. He did however finish the antibiotics.    Render Note In Bullet Format When Appropriate: Yes Post-Care Instructions: I reviewed with the patient in detail post-care instructions. Patient is to wear sunprotection, and avoid picking at any of the treated lesions. Pt may apply Vaseline to crusted or scabbing areas. Detail Level: Simple Number Of Freeze-Thaw Cycles: 2 freeze-thaw cycles Consent: The patient's consent was obtained including but not limited to risks of crusting, scabbing, blistering, scarring, darker or lighter pigmentary change, recurrence, incomplete removal and infection. Detail Level: Zone Spray Paint Text: The liquid nitrogen was applied to the skin utilizing a spray paint frosting technique. Include Z78.9 (Other Specified Conditions Influencing Health Status) As An Associated Diagnosis?: No Medical Necessity Clause: This procedure was medically necessary because the lesions that were treated were: Medical Necessity Information: It is in your best interest to select a reason for this procedure from the list below. All of these items fulfill various CMS LCD requirements except the new and changing color options.

## 2025-06-26 RX ORDER — FLUTICASONE PROPIONATE AND SALMETEROL 250; 50 UG/1; UG/1
POWDER RESPIRATORY (INHALATION)
Qty: 60 EACH | Refills: 0 | Status: SHIPPED | OUTPATIENT
Start: 2025-06-26

## 2025-06-26 RX ORDER — FUROSEMIDE 20 MG/1
20 TABLET ORAL DAILY
Qty: 30 TABLET | Refills: 5 | OUTPATIENT
Start: 2025-06-26

## 2025-06-26 RX ORDER — FOLIC ACID 1 MG/1
1000 TABLET ORAL DAILY
Qty: 30 TABLET | Refills: 5 | Status: SHIPPED | OUTPATIENT
Start: 2025-06-26

## 2025-06-26 RX ORDER — FERROUS SULFATE 325(65) MG
TABLET ORAL
Qty: 90 TABLET | Refills: 0 | Status: SHIPPED | OUTPATIENT
Start: 2025-06-26

## 2025-06-26 NOTE — TELEPHONE ENCOUNTER
Refill Request     CONFIRM preferrred pharmacy with the patient.    If Mail Order Rx - Pend for 90 day refill.      Last Seen: Last Seen Department: 5/9/2025  Last Seen by PCP: 5/9/2025    Last Written: 7/25/24    If no future appointment scheduled, route STAFF MESSAGE with patient name to the  Pool for scheduling.      Next Appointment:   Future Appointments   Date Time Provider Department Center   8/15/2025  2:40 PM [facility]   9/25/2025  9:15 AM Todd Joyce MD [facility]       Message sent to  to schedule appt with patient?  N/A      Requested Prescriptions     Pending Prescriptions Disp Refills    furosemide (LASIX) 20 MG tablet [Pharmacy Med Name: FUROSEMIDE 20MG TABLET] 30 tablet 5     Sig: TAKE ONE (1) TABLET BY MOUTH DAILY    fluticasone-salmeterol (ADVAIR) 250-50 MCG/ACT AEPB diskus inhaler [Pharmacy Med Name: FLUTICASONE PROPIONATE/SALMETEROL 250/50 AERO POW BR ACT] 60 each 0     Sig: INHALE ONE (1) PUFF BY MOUTH TWICE DAILY

## 2025-06-26 NOTE — TELEPHONE ENCOUNTER
Refill Request     CONFIRM preferrred pharmacy with the patient.    If Mail Order Rx - Pend for 90 day refill.      Last Seen: Last Seen Department: 5/9/2025  Last Seen by PCP: 5/9/2025    Last Written: 9-    If no future appointment scheduled, route STAFF MESSAGE with patient name to the  Pool for scheduling.      Next Appointment:   Future Appointments   Date Time Provider Department Center   8/15/2025  2:40 PM [facility]   9/25/2025  9:15 AM [facility]       Message sent to  to schedule appt with patient?  N/A      Requested Prescriptions     Pending Prescriptions Disp Refills    folic acid (FOLVITE) 1 MG tablet [Pharmacy Med Name: FOLIC ACID 1000MCG TABLET] 30 tablet 5     Sig: TAKE ONE (1) TABLET BY MOUTH DAILY    FEROSUL 325 (65 Fe) MG tablet [Pharmacy Med Name: FEROSUL 325MG TABLET] 90 tablet 0     Sig: TAKE ONE (1) TABLET BY MOUTH DAILY WITH BREAKFAST

## 2025-07-28 RX ORDER — FLUTICASONE PROPIONATE AND SALMETEROL 250; 50 UG/1; UG/1
POWDER RESPIRATORY (INHALATION)
Qty: 60 EACH | Refills: 0 | Status: SHIPPED | OUTPATIENT
Start: 2025-07-28

## 2025-08-26 DIAGNOSIS — J41.8 MIXED SIMPLE AND MUCOPURULENT CHRONIC BRONCHITIS (HCC): ICD-10-CM

## 2025-08-26 RX ORDER — ALBUTEROL SULFATE 90 UG/1
INHALANT RESPIRATORY (INHALATION)
Qty: 18 G | Refills: 2 | Status: SHIPPED | OUTPATIENT
Start: 2025-08-26

## 2025-08-26 RX ORDER — FLUTICASONE PROPIONATE AND SALMETEROL 250; 50 UG/1; UG/1
POWDER RESPIRATORY (INHALATION)
Qty: 60 EACH | Refills: 0 | Status: SHIPPED | OUTPATIENT
Start: 2025-08-26

## 2025-08-26 RX ORDER — PEN NEEDLE, DIABETIC, SAFETY 30 GX3/16"
NEEDLE, DISPOSABLE MISCELLANEOUS
Qty: 100 EACH | Refills: 0 | Status: SHIPPED | OUTPATIENT
Start: 2025-08-26

## 2025-08-28 DIAGNOSIS — F41.9 ANXIETY: ICD-10-CM

## 2025-08-28 DIAGNOSIS — F41.8 SITUATIONAL ANXIETY: ICD-10-CM

## 2025-08-28 DIAGNOSIS — E11.42 DIABETIC POLYNEUROPATHY ASSOCIATED WITH TYPE 2 DIABETES MELLITUS (HCC): ICD-10-CM

## 2025-08-29 RX ORDER — DULOXETIN HYDROCHLORIDE 60 MG/1
60 CAPSULE, DELAYED RELEASE ORAL DAILY
Qty: 90 CAPSULE | Refills: 0 | Status: SHIPPED | OUTPATIENT
Start: 2025-08-29

## (undated) PROCEDURE — 5A1955Z RESPIRATORY VENTILATION, GREATER THAN 96 CONSECUTIVE HOURS: ICD-10-PCS

## (undated) PROCEDURE — 5A09557 ASSISTANCE WITH RESPIRATORY VENTILATION, GREATER THAN 96 CONSECUTIVE HOURS, CONTINUOUS POSITIVE AIRWAY PRESSURE: ICD-10-PCS

## (undated) PROCEDURE — 0D9670Z DRAINAGE OF STOMACH WITH DRAINAGE DEVICE, VIA NATURAL OR ARTIFICIAL OPENING: ICD-10-PCS

## (undated) PROCEDURE — 0BH17EZ INSERTION OF ENDOTRACHEAL AIRWAY INTO TRACHEA, VIA NATURAL OR ARTIFICIAL OPENING: ICD-10-PCS

## (undated) PROCEDURE — 5A0955A ASSISTANCE WITH RESPIRATORY VENTILATION, GREATER THAN 96 CONSECUTIVE HOURS, HIGH NASAL FLOW/VELOCITY: ICD-10-PCS

## (undated) DEVICE — SUTURE VCRL + SZ 2-0 L27IN ABSRB CLR CT-1 1/2 CIR TAPERCUT VCP259H

## (undated) DEVICE — SUTURE PROL SZ 2-0 L42IN NONABSORBABLE BLU CT-1 L36MM 3/8 D9693

## (undated) DEVICE — SUTURE VCRL + SZ 3-0 L18IN ABSRB UD SH 1/2 CIR TAPERCUT NDL VCP864D

## (undated) DEVICE — DECANTER BAG 9": Brand: MEDLINE INDUSTRIES, INC.

## (undated) DEVICE — DRESSING FOAM W4XL12IN AG SIL ADH ANTIMIC POSTOP OPTIFOAM

## (undated) DEVICE — Device

## (undated) DEVICE — CONMED SCOPE SAVER BITE BLOCK, 20X27 MM: Brand: SCOPE SAVER

## (undated) DEVICE — DRESSING,GAUZE,XEROFORM,CURAD,1"X8",ST: Brand: CURAD

## (undated) DEVICE — YANKAUER,BULB TIP,VENTED,RIGID,STERILE: Brand: MEDLINE INDUSTRIES, INC.

## (undated) DEVICE — GOWN SIRUS NONREIN XL W/TWL: Brand: MEDLINE INDUSTRIES, INC.

## (undated) DEVICE — K WIRE FIX L150MM DIA1.6MM TRCR PNT 1 END FOR SM FRAG UNIV
Type: IMPLANTABLE DEVICE | Site: ANKLE | Status: NON-FUNCTIONAL
Removed: 2024-02-12

## (undated) DEVICE — GOWN,SIRUS,POLYRNF,BRTHSLV,XL,30/CS: Brand: MEDLINE

## (undated) DEVICE — DRAPE,REIN 53X77,STERILE: Brand: MEDLINE

## (undated) DEVICE — LOOP VES L406MM DIA1MM MAXI BLU SIL RADPQ DISP

## (undated) DEVICE — ENDO CARRY-ON PROCEDURE KIT INCLUDES SUCTION TUBING, LUBRICANT, GAUZE, BIOHAZARD STICKER, TRANSPORT PAD AND INTERCEPT BEDSIDE KIT.: Brand: ENDO CARRY-ON PROCEDURE KIT

## (undated) DEVICE — SUTURE PROL SZ 5-0 L36IN NONABSORBABLE BLU L13MM C-1 3/8 8720H

## (undated) DEVICE — CHLORAPREP 26ML ORANGE

## (undated) DEVICE — FORCEP BX STD CAP 240CM RAD JAW 4

## (undated) DEVICE — BIT DRL DIA2MM STD QUIK CONN FOR PERIARTC LOK PLT SYS

## (undated) DEVICE — SMARTGOWN SURGICAL GOWN, XL: Brand: CONVERTORS

## (undated) DEVICE — TOWEL,OR,DSP,ST,BLUE,STD,6/PK,12PK/CS: Brand: MEDLINE

## (undated) DEVICE — Device: Brand: DISPOSABLE ELECTROSURGICAL SNARE

## (undated) DEVICE — FOGARTY - HYDRAGRIP SURGICAL - CLAMP INSERTS: Brand: FOGARTY SOFTJAW

## (undated) DEVICE — SPONGE LAP W18XL18IN WHT COT 4 PLY FLD STRUNG RADPQ DISP ST

## (undated) DEVICE — 3M™ IOBAN™ 2 ANTIMICROBIAL INCISE DRAPE 6651EZ: Brand: IOBAN™ 2

## (undated) DEVICE — GLOVE SURG SZ 8 L11.77IN FNGR THK9.8MIL STRW LTX POLYMER

## (undated) DEVICE — DISH PETRI ST LF

## (undated) DEVICE — DRESSING FOAM W4XL10IN POLYUR SAFETAC MULTILAYERED ABSRB PD

## (undated) DEVICE — LOWER EXTREMITY: Brand: MEDLINE INDUSTRIES, INC.

## (undated) DEVICE — TOWEL,OR,DSP,ST,BLUE,STD,4/PK,20PK/CS: Brand: MEDLINE

## (undated) DEVICE — GLOVE SURG SZ 65 THK91MIL LTX FREE SYN POLYISOPRENE

## (undated) DEVICE — AGENT HEMSTAT W4XL8IN OXIDIZED REGENERATED CELOS ABSRB

## (undated) DEVICE — GOWN SIRUS NONREIN LG W/TWL: Brand: MEDLINE INDUSTRIES, INC.

## (undated) DEVICE — SUTURE VCRL + SZ 2-0 L18IN ABSRB UD CT1 L36MM 1/2 CIR VCP839D

## (undated) DEVICE — SUTURE PROL SZ 3-0 L48IN NONABSORBABLE BLU SH L26MM 1/2 CIR 8534H

## (undated) DEVICE — ELECTRODE ECG MONITR FOAM TEAR DROP ADLT RED

## (undated) DEVICE — GLOVE SURG SZ 65 L12IN FNGR THK79MIL GRN LTX FREE

## (undated) DEVICE — CANNULA,OXY,ADULT,SUPERSOFT,W/7'TUB,SC: Brand: MEDLINE INDUSTRIES, INC.

## (undated) DEVICE — SHEET,DRAPE,53X77,STERILE: Brand: MEDLINE

## (undated) DEVICE — GLOVE SURG 9 PF CRM STRL SENSICARE PI MIC LF

## (undated) DEVICE — TTL1LYR 16FR10ML 100%SIL TMPST TR: Brand: MEDLINE

## (undated) DEVICE — ELECTRODE,RADIOTRANSLUCENT,FOAM,3PK: Brand: MEDLINE

## (undated) DEVICE — Z DUP USE 2625722 SPONGE GZ W4XL4IN 12 PLY KERLIX

## (undated) DEVICE — MHCZ MINOR: Brand: MEDLINE INDUSTRIES, INC.

## (undated) DEVICE — SUTURE MCRYL SZ 4-0 L18IN ABSRB UD L19MM PS-2 3/8 CIR PRIM Y496G

## (undated) DEVICE — COVER,LIGHT HANDLE,FLX,2/PK: Brand: MEDLINE INDUSTRIES, INC.

## (undated) DEVICE — STAPLER EXT SKIN 35 WIDE S STL STPL SQUEEZE HNDL VISISTAT

## (undated) DEVICE — SUTURE VCRL + SZ 3-0 L27IN ABSRB UD L26MM SH 1/2 CIR VCP416H

## (undated) DEVICE — CAPSULE ENDOSCP L26.2MM DIA11.4MM BIOCOMPATIBLE PLAS SB 3

## (undated) DEVICE — STRIP,CLOSURE,WOUND,MEDI-STRIP,1/2X4: Brand: MEDLINE

## (undated) DEVICE — INTENDED TO BE USED TO OCCLUDE, RETRACT AND IDENTIFY ARTERIES, VEINS, TENDONS AND NERVES IN SURGICAL PROCEDURES: Brand: STERION®  VESSEL LOOP

## (undated) DEVICE — BANDAGE COMPR W4INXL10YD WHITE/BEIGE E MTRX HK LOOP CLSR

## (undated) DEVICE — CANNULA NSL 13FT TUBE AD ETCO2 DIV SAMP M

## (undated) DEVICE — BIT DRL L100MM DIA2.5MM FOR SM FRAG UNIV LOK SYS

## (undated) DEVICE — SOLUTION IV IRRIG POUR BRL 0.9% SODIUM CHL 2F7124

## (undated) DEVICE — APPLIER CLP L L13IN TI MULT RNG HNDL 20 CLP STR LIGACLP

## (undated) DEVICE — C-ARM: Brand: UNBRANDED

## (undated) DEVICE — GLOVE ORANGE PI 8 1/2   MSG9085

## (undated) DEVICE — YANKAUER,BULB TIP,W/O VENT,RIGID,STERILE: Brand: MEDLINE

## (undated) DEVICE — Z DISCONTINUED PER MEDLINE USE 2425483 TAPE UMB L30IN DIA1/8IN WHT COT NONABSORBABLE W/O NDL FOR

## (undated) DEVICE — RETAINER VISCERA GLASSMAN FISH DISP ST

## (undated) DEVICE — ENDOSCOPIC KIT 2 12 FT OP4 DE2 GWN SYR

## (undated) DEVICE — APPLIER LIG CLP M L11IN TI STR RNG HNDL FOR 20 CLP DISP

## (undated) DEVICE — SUTURE PROL SZ 4-0 L36IN NONABSORBABLE BLU RB-1 L17MM 8357H

## (undated) DEVICE — BANDAGE COMPR W6INXL12FT SMOOTH FOR LIMB EXSANG ESMARCH

## (undated) DEVICE — PLEDGET SURG W0.375XL0.062IN THK1.5MM WHT SFT PTFE RECT

## (undated) DEVICE — ELECTRODE BLDE L4IN NONINSULATED EDGE

## (undated) DEVICE — APPLIER CLP L9.375IN APER 2.1MM CLS L3.8MM 20 SM TI CLP

## (undated) DEVICE — GLOVE ORANGE PI 7 1/2   MSG9075

## (undated) DEVICE — ENDOVIVE SFT PEG KIT PULL WENFIT 20F BX2

## (undated) DEVICE — UBD W POUCH: Brand: MEDLINE

## (undated) DEVICE — TRAP SPEC POLYPR MULT CHMBR DISP

## (undated) DEVICE — MASTISOL ADHESIVE LIQ 2/3ML

## (undated) DEVICE — GLOVE SURG SZ 8 CRM LTX FREE POLYISOPRENE POLYMER BEAD ANTI

## (undated) DEVICE — DRAPE,LAP,CHOLE,W/TROUGHS,STERILE: Brand: MEDLINE

## (undated) DEVICE — PICO 7 10CM X 20CM: Brand: PICO™ 7

## (undated) DEVICE — GOWN,AURORA,NONREINF,RAGLAN,XXL,STERILE: Brand: MEDLINE

## (undated) DEVICE — SUTURE PDS II SZ 0 L60IN ABSRB VLT L48MM CTX 1/2 CIR Z990G

## (undated) DEVICE — HYPODERMIC SAFETY NEEDLE: Brand: MONOJECT

## (undated) DEVICE — GROUNDING PAD /C 9FT CORD  PK/25

## (undated) DEVICE — DRESSING GERM DIA1IN CNTR H DIA7MM BLU CHG ANTIMIC PROTCT

## (undated) DEVICE — SUTURE MCRYL + SZ 4-0 L18IN ABSRB UD L19MM PS-2 3/8 CIR MCP496G

## (undated) DEVICE — PADDING CAST W6INXL4YD COT LO LINTING WYTEX

## (undated) DEVICE — PAD ABDOMINAL 8X10 ST

## (undated) DEVICE — MERCY HEALTH WEST TURNOVER: Brand: MEDLINE INDUSTRIES, INC.